# Patient Record
Sex: FEMALE | Race: WHITE | NOT HISPANIC OR LATINO | Employment: OTHER | URBAN - METROPOLITAN AREA
[De-identification: names, ages, dates, MRNs, and addresses within clinical notes are randomized per-mention and may not be internally consistent; named-entity substitution may affect disease eponyms.]

---

## 2017-01-03 RX ORDER — OMEPRAZOLE AND SODIUM BICARBONATE 40; 1100 MG/1; MG/1
1 CAPSULE ORAL EVERY MORNING
Qty: 30 CAPSULE | Refills: 11 | Status: SHIPPED | OUTPATIENT
Start: 2017-01-03 | End: 2017-08-28 | Stop reason: SDUPTHER

## 2017-01-06 ENCOUNTER — TELEPHONE (OUTPATIENT)
Dept: PHARMACY | Facility: CLINIC | Age: 57
End: 2017-01-06

## 2017-01-09 ENCOUNTER — LAB VISIT (OUTPATIENT)
Dept: LAB | Facility: HOSPITAL | Age: 57
End: 2017-01-09
Attending: INTERNAL MEDICINE
Payer: COMMERCIAL

## 2017-01-09 ENCOUNTER — DOCUMENTATION ONLY (OUTPATIENT)
Dept: REHABILITATION | Facility: HOSPITAL | Age: 57
End: 2017-01-09

## 2017-01-09 ENCOUNTER — OFFICE VISIT (OUTPATIENT)
Dept: INTERNAL MEDICINE | Facility: CLINIC | Age: 57
End: 2017-01-09
Payer: COMMERCIAL

## 2017-01-09 VITALS
DIASTOLIC BLOOD PRESSURE: 88 MMHG | WEIGHT: 154.75 LBS | HEART RATE: 94 BPM | BODY MASS INDEX: 29.22 KG/M2 | HEIGHT: 61 IN | SYSTOLIC BLOOD PRESSURE: 138 MMHG | OXYGEN SATURATION: 97 %

## 2017-01-09 DIAGNOSIS — M06.9 RHEUMATOID ARTHRITIS OF HAND, UNSPECIFIED LATERALITY, UNSPECIFIED RHEUMATOID FACTOR PRESENCE: ICD-10-CM

## 2017-01-09 DIAGNOSIS — S51.812A SKIN TEAR OF FOREARM WITHOUT COMPLICATION, LEFT, INITIAL ENCOUNTER: Primary | ICD-10-CM

## 2017-01-09 DIAGNOSIS — Z79.631 METHOTREXATE, LONG TERM, CURRENT USE: ICD-10-CM

## 2017-01-09 LAB
ALBUMIN SERPL BCP-MCNC: 3.5 G/DL
ALP SERPL-CCNC: 53 U/L
ALT SERPL W/O P-5'-P-CCNC: 21 U/L
ANION GAP SERPL CALC-SCNC: 13 MMOL/L
AST SERPL-CCNC: 22 U/L
BASOPHILS # BLD AUTO: 0.03 K/UL
BASOPHILS NFR BLD: 0.4 %
BILIRUB SERPL-MCNC: 0.3 MG/DL
BUN SERPL-MCNC: 13 MG/DL
CALCIUM SERPL-MCNC: 8.7 MG/DL
CHLORIDE SERPL-SCNC: 106 MMOL/L
CO2 SERPL-SCNC: 19 MMOL/L
CREAT SERPL-MCNC: 0.9 MG/DL
CRP SERPL-MCNC: 0.4 MG/L
DIFFERENTIAL METHOD: ABNORMAL
EOSINOPHIL # BLD AUTO: 0 K/UL
EOSINOPHIL NFR BLD: 0.4 %
ERYTHROCYTE [DISTWIDTH] IN BLOOD BY AUTOMATED COUNT: 19.1 %
ERYTHROCYTE [SEDIMENTATION RATE] IN BLOOD BY WESTERGREN METHOD: 5 MM/HR
EST. GFR  (AFRICAN AMERICAN): >60 ML/MIN/1.73 M^2
EST. GFR  (NON AFRICAN AMERICAN): >60 ML/MIN/1.73 M^2
GLUCOSE SERPL-MCNC: 107 MG/DL
HCT VFR BLD AUTO: 32.5 %
HGB BLD-MCNC: 9.7 G/DL
LYMPHOCYTES # BLD AUTO: 1.8 K/UL
LYMPHOCYTES NFR BLD: 21.4 %
MCH RBC QN AUTO: 22.9 PG
MCHC RBC AUTO-ENTMCNC: 29.8 %
MCV RBC AUTO: 77 FL
MONOCYTES # BLD AUTO: 0.9 K/UL
MONOCYTES NFR BLD: 10.8 %
NEUTROPHILS # BLD AUTO: 5.6 K/UL
NEUTROPHILS NFR BLD: 66.6 %
PLATELET # BLD AUTO: 358 K/UL
PMV BLD AUTO: 9.9 FL
POTASSIUM SERPL-SCNC: 3.5 MMOL/L
PROT SERPL-MCNC: 6.3 G/DL
RBC # BLD AUTO: 4.23 M/UL
SODIUM SERPL-SCNC: 138 MMOL/L
WBC # BLD AUTO: 8.41 K/UL

## 2017-01-09 PROCEDURE — 36415 COLL VENOUS BLD VENIPUNCTURE: CPT

## 2017-01-09 PROCEDURE — 85651 RBC SED RATE NONAUTOMATED: CPT

## 2017-01-09 PROCEDURE — 99999 PR PBB SHADOW E&M-EST. PATIENT-LVL III: CPT | Mod: PBBFAC,,, | Performed by: INTERNAL MEDICINE

## 2017-01-09 PROCEDURE — 85025 COMPLETE CBC W/AUTO DIFF WBC: CPT

## 2017-01-09 PROCEDURE — 99212 OFFICE O/P EST SF 10 MIN: CPT | Mod: S$GLB,,, | Performed by: INTERNAL MEDICINE

## 2017-01-09 PROCEDURE — 86140 C-REACTIVE PROTEIN: CPT

## 2017-01-09 PROCEDURE — 80053 COMPREHEN METABOLIC PANEL: CPT

## 2017-01-09 PROCEDURE — 1159F MED LIST DOCD IN RCRD: CPT | Mod: S$GLB,,, | Performed by: INTERNAL MEDICINE

## 2017-01-09 NOTE — PROGRESS NOTES
PHYSICAL THERAPY DISCHARGE SUMMARY     Name: Joyce Peterson  Clinic Number: [id number]    Diagnosis: Right knee pain, difficulty walking  Physician: Isiah Moran MD  Treatment Orders: Discharge Summary  Past Medical History   Diagnosis Date    Acid reflux     Allergy     Anemia     Asthma     Coronary artery disease     Degenerative disc disease     Dry eyes     Dry mouth     Gastroparesis     Hyperlipidemia     Lateral meniscus derangement 4/6/2016    Osteoarthritis     Osteoporosis     Rheumatoid arthritis(714.0)     Umbilical hernia 8/13/2015       Initial visit: 8/10/16  Date of Last visit: 3  Date of Discharge Note:  1/9/17  Total Visits Received: 3  Missed Visits: 3  ASSESSMENT   Status Towards Goals Met:  Patient did not return to physical therapy.  Pt is discharged prior to achieving the goals established in the initial evaluation due to discontinuing physical therapy.     Goals Not achieved and why: see above    Discharge reason : Pt has not re-scheduled further follow-up sessions    PLAN   This patient is discharged from Physical Therapy Services.     Medical necessity is demonstrated by the following IMPAIRMENTS/PROBLEM LIST:  1) Pain limiting function  2) Postural dysfunction  3) Longus colli/suboccipital tightness  4) Upper trapezius/levator scapula tightness  5) Longus colli/scapula stabilizer/core/lumbar/hip weakness   6) Decreased cervical/shoulder/lumbar/hip/knee ROM   7) Decreased cervical/thoracic/lumbar/hip/knee joint mobility  8) Decreased UT/levator scapula/scalenes soft tissue extensibility  9) Decreased piriformis/HS/hip flexor extensibility  10) Lack of HEP  11) right UE paresthesia      GOALS:   Short Term Goals: 4 weeks  1. Pt. to report decreased cervical/lumbar/right knee pain </= 5/10 at worst to increase tolerance for upright unsupported sitting  2. Pt. to demonstrate proper cervical and scapula retraction requiring min. to no verbal cues from PT  3. Increase  mid-cervical joint mobility to 2+/6 to promote greater ease with driving  4. Increase thoracic joint mobility to 2+/6 to promote greater ease with self care skills  5. Increase bilateral cervical rotation AROM to 70 degrees to promote greater ease with driving  6. Pt. to demonstrate increased MMT for cervical paraspinals to 3+/5 to improve tolerance to upright unsupported sitting posture.  7. Pt. to demonstrate increased MMT for B UE elevation to 3+/5 to improve tolerance for ADL and work activities.   8. Pt. to demonstrate increased MMT for mid-trap/lower trap strength to 3/5 to improve tolerance for ADL and work activities.  9. Increased MMT for core/lumbar paraspinals to 3/5 to increase endurance with prolonged sitting/standing.  10. Increased MMT for left gluteus medius to 3+/5 to increase tolerance for ADL and work activities.   11. Pt to tolerate HEP to improve ROM and independence with ADLs  12 Pt. to report a decrease in UE paresthesia by 25% right UE     Long Term Goals: 8 weeks  1. Pt. to report decreased neck/back/hip/knee pain </ = 2/10 at worst to increase tolerance for upright unsupported sitting posture/prolonged standing  2. Pt. to demonstrate proper cervical and scapula retraction requiring no verbal cues from PT  3. Increase mid-cervical joint mobility to 3-/6 to promote greater ease with driving  4. Increase thoracic joint mobility to 3-/6 to promote greater ease with self care skills  5. Increase bilateral cervical rotation AROM to 80 degrees bilaterally to promote greater ease with driving  6. Pt. to demonstrate increased MMT for cervical paraspinals to 4/5 to improve tolerance to upright unsupported sitting posture.  7. Pt. to demonstrate increased MMT for B UE elevation to 4/5 to improve tolerance for ADL and work activities.   8. Pt. to demonstrate increased MMT for mid-trap/lower trap strength to 3+/5 to improve tolerance for ADL and work activities.  9. Increased MMT for core/lumbar  paraspinals to 3+/5 to increase endurance with prolonged sitting/standing.  10. Increased MMT for bilateral gluteus medius to 4/5 to increase tolerance for ADL and work activities.   11. Pt. to present with symmetrical pelvic alignment and report of compliance with activity avoidance/modification to assist with maintenance.  12. Pt to be independent with HEP to improve ROM and independence with ADL's  13. Pt. to report a decrease in UE paresthesia by at least 50% with OH reach/lifting            \

## 2017-01-09 NOTE — PROGRESS NOTES
Joyce Peterson presents today to urgent care for:  skin tear (burning skin tear L arm)      HPI Comments: Patient presents today with a skin tear that happened approximately 5 days ago.  She scraped her arm on the table which took the top layer of skin off.  It's been slightly tender.  No purulent material has been expressed.  No fevers or chills.  No nausea or vomiting.  No warmth or redness noted.  She is up-to-date on her tetanus vaccine-see health maintenance module.      Past medical, social, family and surgical history was reviewed and updated today as needed. See encounter for details.     Review of Systems   Constitutional: Negative for chills and fever.   Gastrointestinal: Negative for nausea and vomiting.       Vitals:    01/09/17 1035   BP: 138/88   Pulse: 94   Body mass index is 29.24 kg/(m^2).   Physical Exam   Musculoskeletal:        Left forearm: She exhibits laceration.        Arms:  No warmth or tenderness to touch.        Assessment/plan:   1. Skin tear of forearm without complication, left, initial encounter  Reassurance that the patient does not have any infection.  She's currently up-to-date on all of her vaccines including tetanus.  Continue with conservative management with antibiotic ointment and covering the wound until healed.  Warning signs for infection discussed in detail and patient voiced understanding.    No Follow-up on file. follow-up as needed.  Patient educational information provided through [facility].

## 2017-01-09 NOTE — PATIENT INSTRUCTIONS
Abrasions  Abrasions are skin scrapes. Their treatment depends on how large and deep the abrasion is.  Home care  You may be prescribed an antibiotic cream or ointment to apply to the wound. This helps prevent infection. Follow instructions when using this medication.  General care  · To care for the abrasion, do the following each day for as long as directed by your health care provider.  ¨ If you were given a bandage, change it once a day. If your bandage sticks to the wound, soak it in warm water until it loosens.  ¨ Wash the area with soap and warm water. You may do this in a sink or under a tub faucet or shower. Rinse off the soap. Then pat the area dry with a clean towel.  ¨ If antibiotic ointment or cream was prescribed, reapply it to the wound as directed. Cover the wound with a fresh non-stick bandage. If the bandage becomes wet or dirty, change it as soon as possible.  · You may use acetaminophen or ibuprofen to control pain unless another pain medication was prescribed. Note: If you have chronic liver or kidney disease or ever had a stomach ulcer or GI bleeding, talk with your health care provider before using these medications. Do not use ibuprofen in children under six months of age.  · Most skin wounds heal within ten days. But an infection may occur despite treatment. Therefore, monitor the wound for signs of infection as listed below.  Follow-up care  Follow up with your health care provider, or as advised.  When to seek medical advice  Call your health care provider right away if any of these occur:  · Fever of 101ºF (38.3ºC) or higher, or as directed by your health care provider  · Increasing pain, redness, swelling, or drainage from the wound  · Bleeding from the wound that does not stop after a few minutes of steady, firm pressure  · Decreased ability to move any body part near wound  © 4332-8772 [employer]. [address] [place] [address], [place] [address]. All rights reserved. This  information is not intended as a substitute for professional medical care. Always follow your healthcare professional's instructions.

## 2017-01-10 ENCOUNTER — TELEPHONE (OUTPATIENT)
Dept: PHARMACY | Facility: CLINIC | Age: 57
End: 2017-01-10

## 2017-01-11 DIAGNOSIS — K31.84 GASTROPARESIS: ICD-10-CM

## 2017-01-23 ENCOUNTER — TELEPHONE (OUTPATIENT)
Dept: PHARMACY | Facility: CLINIC | Age: 57
End: 2017-01-23

## 2017-01-24 RX ORDER — NAPROXEN 500 MG/1
TABLET ORAL
Qty: 60 TABLET | Refills: 1 | Status: SHIPPED | OUTPATIENT
Start: 2017-01-24 | End: 2017-03-27 | Stop reason: SDUPTHER

## 2017-02-01 ENCOUNTER — LAB VISIT (OUTPATIENT)
Dept: LAB | Facility: HOSPITAL | Age: 57
End: 2017-02-01
Attending: INTERNAL MEDICINE
Payer: COMMERCIAL

## 2017-02-01 DIAGNOSIS — Z79.631 METHOTREXATE, LONG TERM, CURRENT USE: ICD-10-CM

## 2017-02-01 DIAGNOSIS — M06.9 RHEUMATOID ARTHRITIS OF HAND, UNSPECIFIED LATERALITY, UNSPECIFIED RHEUMATOID FACTOR PRESENCE: ICD-10-CM

## 2017-02-01 LAB
ALBUMIN SERPL BCP-MCNC: 3.5 G/DL
ALP SERPL-CCNC: 59 U/L
ALT SERPL W/O P-5'-P-CCNC: 15 U/L
ANION GAP SERPL CALC-SCNC: 6 MMOL/L
AST SERPL-CCNC: 22 U/L
BASOPHILS # BLD AUTO: 0.03 K/UL
BASOPHILS NFR BLD: 0.5 %
BILIRUB SERPL-MCNC: 0.2 MG/DL
BUN SERPL-MCNC: 13 MG/DL
CALCIUM SERPL-MCNC: 8.5 MG/DL
CHLORIDE SERPL-SCNC: 105 MMOL/L
CO2 SERPL-SCNC: 24 MMOL/L
CREAT SERPL-MCNC: 1 MG/DL
CRP SERPL-MCNC: 0.5 MG/L
DIFFERENTIAL METHOD: ABNORMAL
EOSINOPHIL # BLD AUTO: 0 K/UL
EOSINOPHIL NFR BLD: 0.2 %
ERYTHROCYTE [DISTWIDTH] IN BLOOD BY AUTOMATED COUNT: 19.6 %
ERYTHROCYTE [SEDIMENTATION RATE] IN BLOOD BY WESTERGREN METHOD: 7 MM/HR
EST. GFR  (AFRICAN AMERICAN): >60 ML/MIN/1.73 M^2
EST. GFR  (NON AFRICAN AMERICAN): >60 ML/MIN/1.73 M^2
GLUCOSE SERPL-MCNC: 190 MG/DL
HCT VFR BLD AUTO: 32 %
HGB BLD-MCNC: 9.7 G/DL
LYMPHOCYTES # BLD AUTO: 1.3 K/UL
LYMPHOCYTES NFR BLD: 19.2 %
MCH RBC QN AUTO: 23 PG
MCHC RBC AUTO-ENTMCNC: 30.3 %
MCV RBC AUTO: 76 FL
MONOCYTES # BLD AUTO: 0.4 K/UL
MONOCYTES NFR BLD: 6.4 %
NEUTROPHILS # BLD AUTO: 4.8 K/UL
NEUTROPHILS NFR BLD: 73.5 %
PLATELET # BLD AUTO: 355 K/UL
PMV BLD AUTO: 10.1 FL
POTASSIUM SERPL-SCNC: 4.3 MMOL/L
PROT SERPL-MCNC: 6.6 G/DL
RBC # BLD AUTO: 4.21 M/UL
SODIUM SERPL-SCNC: 135 MMOL/L
WBC # BLD AUTO: 6.55 K/UL

## 2017-02-01 PROCEDURE — 36415 COLL VENOUS BLD VENIPUNCTURE: CPT | Mod: PO

## 2017-02-01 PROCEDURE — 85025 COMPLETE CBC W/AUTO DIFF WBC: CPT

## 2017-02-01 PROCEDURE — 80053 COMPREHEN METABOLIC PANEL: CPT

## 2017-02-01 PROCEDURE — 85651 RBC SED RATE NONAUTOMATED: CPT

## 2017-02-01 PROCEDURE — 86140 C-REACTIVE PROTEIN: CPT

## 2017-02-02 ENCOUNTER — TELEPHONE (OUTPATIENT)
Dept: RHEUMATOLOGY | Facility: CLINIC | Age: 57
End: 2017-02-02

## 2017-02-02 DIAGNOSIS — R73.9 HYPERGLYCEMIA: Primary | ICD-10-CM

## 2017-02-03 ENCOUNTER — PATIENT MESSAGE (OUTPATIENT)
Dept: RHEUMATOLOGY | Facility: CLINIC | Age: 57
End: 2017-02-03

## 2017-02-07 ENCOUNTER — OFFICE VISIT (OUTPATIENT)
Dept: RHEUMATOLOGY | Facility: CLINIC | Age: 57
End: 2017-02-07
Payer: COMMERCIAL

## 2017-02-07 ENCOUNTER — HOSPITAL ENCOUNTER (OUTPATIENT)
Dept: RADIOLOGY | Facility: HOSPITAL | Age: 57
Discharge: HOME | End: 2017-02-07
Attending: INTERNAL MEDICINE
Payer: COMMERCIAL

## 2017-02-07 ENCOUNTER — PATIENT MESSAGE (OUTPATIENT)
Dept: RHEUMATOLOGY | Facility: CLINIC | Age: 57
End: 2017-02-07

## 2017-02-07 VITALS
DIASTOLIC BLOOD PRESSURE: 74 MMHG | HEART RATE: 91 BPM | SYSTOLIC BLOOD PRESSURE: 116 MMHG | HEIGHT: 64 IN | WEIGHT: 154 LBS | BODY MASS INDEX: 26.29 KG/M2

## 2017-02-07 DIAGNOSIS — K21.9 GASTROESOPHAGEAL REFLUX DISEASE, ESOPHAGITIS PRESENCE NOT SPECIFIED: ICD-10-CM

## 2017-02-07 DIAGNOSIS — M47.12 OSTEOARTHRITIS OF CERVICAL SPINE WITH MYELOPATHY: ICD-10-CM

## 2017-02-07 DIAGNOSIS — M85.80 OSTEOPENIA: ICD-10-CM

## 2017-02-07 DIAGNOSIS — M53.2X6 LUMBAR SPINE INSTABILITY: ICD-10-CM

## 2017-02-07 DIAGNOSIS — M06.9 RHEUMATOID ARTHRITIS INVOLVING MULTIPLE SITES, UNSPECIFIED RHEUMATOID FACTOR PRESENCE: ICD-10-CM

## 2017-02-07 DIAGNOSIS — M54.50 ACUTE BILATERAL LOW BACK PAIN WITHOUT SCIATICA: ICD-10-CM

## 2017-02-07 DIAGNOSIS — Z78.0 MENOPAUSE: Primary | ICD-10-CM

## 2017-02-07 DIAGNOSIS — M54.6 THORACIC BACK PAIN, UNSPECIFIED BACK PAIN LATERALITY, UNSPECIFIED CHRONICITY: ICD-10-CM

## 2017-02-07 DIAGNOSIS — M06.9 RHEUMATOID ARTHRITIS, INVOLVING UNSPECIFIED SITE, UNSPECIFIED RHEUMATOID FACTOR PRESENCE: ICD-10-CM

## 2017-02-07 DIAGNOSIS — D84.9 IMMUNOSUPPRESSED STATUS: ICD-10-CM

## 2017-02-07 DIAGNOSIS — K31.84 GASTROPARESIS: ICD-10-CM

## 2017-02-07 DIAGNOSIS — M47.816 SPONDYLOSIS OF LUMBAR REGION WITHOUT MYELOPATHY OR RADICULOPATHY: ICD-10-CM

## 2017-02-07 PROCEDURE — 99214 OFFICE O/P EST MOD 30 MIN: CPT | Mod: S$GLB,,, | Performed by: INTERNAL MEDICINE

## 2017-02-07 PROCEDURE — 77077 JOINT SURVEY SINGLE VIEW: CPT | Mod: TC

## 2017-02-07 PROCEDURE — 77077 JOINT SURVEY SINGLE VIEW: CPT | Mod: 26,,, | Performed by: RADIOLOGY

## 2017-02-07 PROCEDURE — 99999 PR PBB SHADOW E&M-EST. PATIENT-LVL V: CPT | Mod: PBBFAC,,, | Performed by: INTERNAL MEDICINE

## 2017-02-07 RX ORDER — PREDNISONE 1 MG/1
2 TABLET ORAL DAILY
Qty: 180 TABLET | Refills: 1 | Status: SHIPPED | OUTPATIENT
Start: 2017-02-07 | End: 2017-08-30 | Stop reason: SDUPTHER

## 2017-02-07 RX ORDER — LEFLUNOMIDE 20 MG/1
20 TABLET ORAL DAILY
Qty: 30 TABLET | Refills: 2 | Status: SHIPPED | OUTPATIENT
Start: 2017-02-07 | End: 2017-05-10 | Stop reason: SDUPTHER

## 2017-02-07 RX ORDER — PREDNISONE 5 MG/1
5 TABLET ORAL DAILY
Qty: 90 TABLET | Refills: 1 | Status: SHIPPED | OUTPATIENT
Start: 2017-02-07 | End: 2017-03-06 | Stop reason: DRUGHIGH

## 2017-02-07 ASSESSMENT — ROUTINE ASSESSMENT OF PATIENT INDEX DATA (RAPID3)
MDHAQ FUNCTION SCORE: 1.7
PATIENT GLOBAL ASSESSMENT SCORE: 8
AM STIFFNESS SCORE: 1, YES
FATIGUE SCORE: 5
PSYCHOLOGICAL DISTRESS SCORE: 2.2
TOTAL RAPID3 SCORE: 7.72
WHEN YOU AWAKENED IN THE MORNING OVER THE LAST WEEK, PLEASE INDICATE THE AMOUNT OF TIME IT TAKES UNTIL YOU ARE AS LIMBER AS YOU WILL BE FOR THE DAY: 3 HRS
PAIN SCORE: 9.5

## 2017-02-07 ASSESSMENT — DISEASE ACTIVITY SCORE (DAS28)
CRP_MG_PER_LITER: .5
SWOLLEN_JOINTS_COUNT: 0
GLOBAL_HEALTH_SCORE: 80
TOTAL_SCORE_ESR: 3.04
TENDER_JOINTS_COUNT: 1
ESR_MM_PER_HR: 7
TOTAL_SCORE_CRP: 2.79

## 2017-02-07 NOTE — PROGRESS NOTES
Subjective:       Patient ID: Joyce Peterson is a 56 y.o. female.    Chief Complaint: RA, secondary Sjogren's,    HPI    Severe pain mid back daily over past 7-10 days, continuous but with sharp acute exacerbations.  Not positional, lying or standing. Better standing up. Back of chair sensitive to skin mid back. Went to ED Sat( 4 days ago), chest x-ray normal. She increased prednisone from 5mg to 10mg x 1 day, which helped significantly, now back to 5mg daily. Some arthralgia shoulders, pain elbows. Some ankle pain with slight swelling. Overall fewer GI symptoms off methotrexate. Tolerating leflunomide well    Still recurrent pleurisy, but milder, worse with inspiration and with local thoracic pressure.Saw Dr. Laguerre in Pulmonary  12/8/16:    Uncontrolled asthma with exacerbation four times a year triggered by chronic sinusitis (not bronchitis).  Additional trigger is reflux  Needs to adhere to controller regimen.  Symbicort 2 puffs twice daily keep next to tooth brush and brush teeth after. This is your asthma controller and should be used indefinitely.  Albuterol for rescue (when short of breath, coughing, wheezing) and prevention (prior to activities that make you have symptoms)  Flonase two sprays per nostril daily  Saline nasal irrigation daily in the shower.  Claritin as needed  Pleurisy likely related to autoimmune disease  RTC in three months     Review of Systems   Constitutional: Negative for appetite change, fatigue, fever and unexpected weight change.   HENT: Negative for mouth sores.         Dry mouth   Eyes: Negative for visual disturbance.        Dry   Respiratory: Negative for cough, shortness of breath and wheezing.    Cardiovascular: Negative for chest pain and palpitations.   Gastrointestinal: Negative for abdominal pain, anal bleeding, blood in stool, constipation, diarrhea, nausea and vomiting.   Genitourinary: Negative for dysuria, frequency and urgency.   Musculoskeletal: Positive for back  "pain. Negative for arthralgias, gait problem, joint swelling, myalgias, neck pain and neck stiffness.   Skin: Negative for rash.   Neurological: Positive for numbness. Negative for weakness and headaches.   Hematological: Negative for adenopathy. Does not bruise/bleed easily.   Psychiatric/Behavioral: Negative for sleep disturbance. The patient is not nervous/anxious.          Objective:     Visit Vitals    /74    Pulse 91    Ht 5' 3.6" (1.615 m)    Wt 69.9 kg (154 lb)    BMI 26.77 kg/m2        Physical Exam   Constitutional: She is oriented to person, place, and time and well-developed, well-nourished, and in no distress.   HENT:   Head: Normocephalic and atraumatic.   Mouth/Throat: Oropharynx is clear and moist.   Eyes: Conjunctivae and EOM are normal.   Neck: Normal range of motion. Neck supple. No thyromegaly and no head tilt present.   Cardiovascular: Normal rate, regular rhythm, normal heart sounds and intact distal pulses.  Exam reveals no gallop and no friction rub.    No murmur heard.  Pulmonary/Chest: Breath sounds normal. No respiratory distress. She has no wheezes. She has no rales. She exhibits tenderness.   No pleural rub   Abdominal: Soft. She exhibits no distension and no mass. There is no tenderness.       Right Side Rheumatological Exam     Examination finds the shoulder, elbow, wrist, knee, 1st PIP, 1st MCP, 2nd PIP, 2nd MCP, 3rd PIP, 3rd MCP, 4th PIP, 4th MCP, 5th PIP and 5th MCP normal.    Left Side Rheumatological Exam     Examination finds the shoulder, elbow, wrist, knee, 1st PIP, 1st MCP, 2nd PIP, 2nd MCP, 3rd PIP, 3rd MCP, 4th PIP, 4th MCP, 5th PIP and 5th MCP normal.      Back/Neck Exam   General Inspection   Gait: normal     Spinal Kyphosis: absent  Spinal Lordosis:  absent  Head Tilt:  negative    Neck Range of Motion   Flexion: Limited and mild  Extension: Limited and mild  Right Lateral Bend: abnormal  Left Lateral Bend: abnormal  Right Rotation: abnormal  Left Rotation: " abnormal    Comments:  No percussion tenderness thoracic or lumbar spine  No rash    Lymphadenopathy:     She has no cervical adenopathy.   Neurological: She is alert and oriented to person, place, and time. She displays normal reflexes. Gait normal.   Nl motor strength UE and LE bilateral   Musculoskeletal: She exhibits no edema.       Results for TONNY GOMEZ (MRN [id number]) as of 2/7/2017 14:51   Ref. Range 2/1/2017 13:32 2/6/2017 10:05 2/6/2017 12:05   WBC Latest Ref Range: 3.90 - 12.70 K/uL 6.55     RBC Latest Ref Range: 4.00 - 5.40 M/uL 4.21     Hemoglobin Latest Ref Range: 12.0 - 16.0 g/dL 9.7 (L)     Hematocrit Latest Ref Range: 37.0 - 48.5 % 32.0 (L)     MCV Latest Ref Range: 82 - 98 fL 76 (L)     MCH Latest Ref Range: 27.0 - 31.0 pg 23.0 (L)     MCHC Latest Ref Range: 32.0 - 36.0 % 30.3 (L)     RDW Latest Ref Range: 11.5 - 14.5 % 19.6 (H)     Platelets Latest Ref Range: 150 - 350 K/uL 355 (H)     MPV Latest Ref Range: 9.2 - 12.9 fL 10.1     Gran% Latest Ref Range: 38.0 - 73.0 % 73.5 (H)     Gran # Latest Ref Range: 1.8 - 7.7 K/uL 4.8     Lymph% Latest Ref Range: 18.0 - 48.0 % 19.2     Lymph # Latest Ref Range: 1.0 - 4.8 K/uL 1.3     Mono% Latest Ref Range: 4.0 - 15.0 % 6.4     Mono # Latest Ref Range: 0.3 - 1.0 K/uL 0.4     Eosinophil% Latest Ref Range: 0.0 - 8.0 % 0.2     Eos # Latest Ref Range: 0.0 - 0.5 K/uL 0.0     Basophil% Latest Ref Range: 0.0 - 1.9 % 0.5     Baso # Latest Ref Range: 0.00 - 0.20 K/uL 0.03     Sed Rate Latest Ref Range: 0 - 20 mm/Hr 7     Sodium Latest Ref Range: 136 - 145 mmol/L 135 (L)     Potassium Latest Ref Range: 3.5 - 5.1 mmol/L 4.3     Chloride Latest Ref Range: 95 - 110 mmol/L 105     CO2 Latest Ref Range: 23 - 29 mmol/L 24     Anion Gap Latest Ref Range: 8 - 16 mmol/L 6 (L)     BUN, Bld Latest Ref Range: 6 - 20 mg/dL 13     Creatinine Latest Ref Range: 0.5 - 1.4 mg/dL 1.0     eGFR if non African American Latest Ref Range: >60 mL/min/1.73 m^2 >60.0     eGFR if African  "American Latest Ref Range: >60 mL/min/1.73 m^2 >60.0     Glucose Latest Ref Range: 70 - 110 mg/dL 190 (H)     Calcium Latest Ref Range: 8.7 - 10.5 mg/dL 8.5 (L)     Alkaline Phosphatase Latest Ref Range: 55 - 135 U/L 59     Total Protein Latest Ref Range: 6.0 - 8.4 g/dL 6.6     Albumin Latest Ref Range: 3.5 - 5.2 g/dL 3.5     Total Bilirubin Latest Ref Range: 0.1 - 1.0 mg/dL 0.2     AST Latest Ref Range: 10 - 40 U/L 22     ALT Latest Ref Range: 10 - 44 U/L 15     CRP Latest Ref Range: 0.0 - 8.2 mg/L 0.5     Glucose, Fasting Latest Ref Range: 70 - 110 mg/dL   78   Hemoglobin A1C Latest Ref Range: 4.5 - 6.2 %  6.0    Estimated Avg Glucose Latest Ref Range: 68 - 131 mg/dL  126    Differential Method Unknown Automated     Results for JASON TONNY B (MRN [id number]) as of 2/7/2017 15:03   Ref. Range 11/19/2016 11:43   Cholesterol Latest Ref Range: 120 - 199 mg/dL 203 (H)   HDL Latest Ref Range: 40 - 75 mg/dL 98 (H)   LDL Cholesterol Latest Ref Range: 63.0 - 159.0 mg/dL 76.6   Total Cholesterol/HDL Ratio Latest Ref Range: 2.0 - 5.0  2.1   Triglycerides Latest Ref Range: 30 - 150 mg/dL 142        Time of Procedure: 11/14/16 14:10:00  Accession # [id number]  Comparison: Chest radiograph 10/11/16.    Technique:   The chest was surveyed from the apices through the costophrenic angles.  Data was reconstructed at 5-mm increments for contiguous 5-mm images in the axial, sagittal and coronal planes and post processed for extraction of 1.25-mm "high resolution" images at 10-mm increments and for 8 mm maximal-intensity (MIP) images at 2 mm increments confined to the axial plane.  No additional radiation was employed.      Findings:  We detect no convincing evidence of abnormality at the base of the neck.      There is a left-sided arch with 3 branch vessels.  Aorta maintains normal caliber, contour, and course. There is moderate coronary atherosclerosis, greater than expected for the patient's age, for which consultation with " cardiology is recommended.    Pulmonary arteries have normal caliber, contour and distribution.  There are four pulmonary veins.  Systemic and pulmonary veno atrial connections are concordant.    No significant pleural or pericardial fluid is identified.  There is no axillary or mediastinal lymph node enlargement.  The hilar contours are unremarkable on this noncontrasted examination.    The esophagus is mildly dilated with air and small volume of dependent fluid, less conspicuous than on prior chest radiograph performed 10/11/16.  Given the patient's history of rheumatoid arthritis, esophageal dysmotility, including but not limited to achalasia, should be considered.   The gallbladder surgically absent, and there is a small volume of pneumobilia.  No additional additional significant abnormality is identified within the upper abdomen.    The superficial soft tissues demonstrate no significant abnormalities. The osseous structures demonstrate mild degenerative changes of the thoracic spine without evidence of fracture or aggressive lesion.    The tracheobronchial tree demonstrates no significant abnormality.    The lungs are symmetrically expanded.  No significant pulmonary disease is identified.   Impression     1. Mild dilatation of the esophagus with air and fluid, less conspicuous than on chest radiograph performed 10/11/16.  Given the patient's history of rheumatoid arthritis, esophageal dysmotility, including but not limited to achalasia, should be considered.    2.  Moderate coronary artery atherosclerosis, greater than expected for the patient's age, for which consultation with cardiology is recommended.    3.  No significant pulmonary disease identified.      This report has been flagged in the Epic medical record system.  ______________________________________     Electronically signed by resident: MARITZA HERNÁNDEZ MD  Date: 11/14/16  Time: 15:44            As the supervising and teaching physician, I  personally reviewed the images and resident's interpretation and I agree with the findings.          Electronically signed by: Antionette Sifuentes MD  Date: 11/14/16  Time: 15:50      The bone density shows osteopenia, but not low enough to warrant drug therapy, only calcium 1200mg daily and vitamin D 800-1000 units daily OTC. RJQ   External Result Report   External Result Report   Narrative   Clinical History: Postmenopausal    A quantitative bone mineral density was obtained using the DEXA technique.      The bone mineral density measured from L1 through L4 is 1.010g/cm2.  This corresponds to a T score of -1.5 and a Z score of -0.7.    The bone mineral density within the left femoral neck measures 0.899 g/cm2.  This corresponds to a T score of -1.0 and a Z score of 0.0.    The bone mineral density within the right femoral neck measures 0.855 g/cm2.  This corresponds to a T score of -1.3 and a Z score of -0.3.    FRAX RESULTS:  10-year Probability of Fracture:  Major Osteoporotic Fracture 13.4%.  Hip Fracture 1.1%.   Impression     Osteopenia of the lumbar spine and right femoral neck.  Normal bone mineral density of the left femoral neck.    World Health Organization criteria:  Normal -- T score greater than or equal to -1.0.  Osteopenia -- T score between -1.0 and -2.5.  Osteoporosis -- T score less than or equal to -2.5.        Electronically signed by: JOSEPH WEISS MD  Date: 08/22/14  Time: 13:05     Encounter   View Encounter      Reviewed By   Isiah Moran MD on 8/25/2014  5:44 PM     Median latencies are prolonged, MNCV is mildly slow.   Other nerves are normal.   No abnormalities noted on needle exam.   CONCLUSION: COMPARISON TO STUDY PERFORMED ON 7/29/2013.   MODERATE CARPAL TUNNEL SYNDROME.   THERE HAS BEEN MILD PROGRESSION FROM THE PREVIOUS EXAM.   _______________________________________   Wong Leal III, MD, FAANEM   Assessment:     erosive RA discontinued long term methotrexate b/o nausea with po  and sc and reduced dose, tolerating leflunomide. DAS28: 3.04 XRP41-EMV 2.79(both LDA) but on prednisone 5mg daily    Secondary Sjogren's   coronary ASCVD   dilated esophagus, dysmotility to have EGD and colonoscopy with Dr. Moore   osteopenia < NOF FRAX therapy threshold but overdue for DXA and is on Premarin   right CTS using thumb brace rather than wrist brace, .   Anemia iron deficiency  Plan:     MRI w/wo contrast thoracic and lumbar spine  DXA   arthritis survey today   ref to Dr. Yarbrough for right CTS worsening    Increase prednisone to 7mg daily   Cont tofacitinib-XR 11mg daily  Cont leflunomide 20mg daily  F/u Dr. Moore for EGD/colonoscopy  Sleep in wrist brace   RTC 3 months with standing labs

## 2017-02-07 NOTE — MR AVS SNAPSHOT
[facility]  [address]  [facility] [address]  Phone: [phone]  Fax: [phone]                  Joyce Peterson   2017 2:30 PM   Office Visit    Description:  Female : 1960   Provider:  Isiah Moran MD   Department:  [facility]           Diagnoses this Visit        Comments    Menopause    -  Primary     Rheumatoid arthritis involving multiple sites, unspecified rheumatoid factor presence         Rheumatoid arthritis, involving unspecified site, unspecified rheumatoid factor presence         Thoracic back pain, unspecified back pain laterality, unspecified chronicity         Lumbar spine instability         Immunosuppressed status         Osteoarthritis of cervical spine with myelopathy         Spondylosis of lumbar region without myelopathy or radiculopathy         Osteopenia         Gastroparesis         Gastroesophageal reflux disease, esophagitis presence not specified         Acute bilateral low back pain without sciatica                To Do List           Future Appointments        Provider Department Dept Phone    3/6/2017 11:30 AM Lani Laguerre MD [facility] Pulmonary Services [phone]      Goals (5 Years of Data)     None       These Medications        Disp Refills Start End    predniSONE (DELTASONE) 5 MG tablet 90 tablet 1 2017     Take 1 tablet (5 mg total) by mouth once daily. - Oral    Pharmacy: [facility]/pharmacy #[id number] - [place], [place] - [address] [address] Ph #: [phone]       predniSONE (DELTASONE) 1 MG tablet 180 tablet 1 2017    Take 2 tablets (2 mg total) by mouth once daily. - Oral    Pharmacy: [facility]/pharmacy #[id number] - [place], [place] - [address] [address] Ph #: [phone]       leflunomide (ARAVA) 20 MG Tab 30 tablet 2 2017     Take 1 tablet (20 mg total) by mouth once daily. - Oral    Pharmacy: [facility] [place]  [address] Ph #: [phone]       tofacitinib  (XELJANZ XR) 11 mg Tb24 30 tablet 1 2/7/2017     Take 1 tablet by mouth once daily. - Oral    Pharmacy: [facility] - [address] [place] [address] [place] Ph #: [phone]         [facility]     [facility] - 24/7 Assistance  Registered nurses in the [facility] provide clinical advisement, health education, appointment booking, and other advisory services.  Call for this free service at [phone].             Medications           Message regarding Medications     Verify the changes and/or additions to your medication regime listed below are the same as discussed with your clinician today.  If any of these changes or additions are incorrect, please notify your healthcare provider.        START taking these NEW medications        Refills    predniSONE (DELTASONE) 1 MG tablet 1    Sig: Take 2 tablets (2 mg total) by mouth once daily.    Class: Normal    Route: Oral      CHANGE how you are taking these medications     Start Taking Instead of    predniSONE (DELTASONE) 5 MG tablet predniSONE (DELTASONE) 5 MG tablet    Dosage:  Take 1 tablet (5 mg total) by mouth once daily. Dosage:  Take 1.5 tablets (7.5 mg total) by mouth once daily.    Reason for Change:  Reorder     tofacitinib (XELJANZ XR) 11 mg Tb24 XELJANZ XR 11 mg Tb24    Dosage:  Take 1 tablet by mouth once daily. Dosage:  TAKE ONE TABLET BY MOUTH EVERY DAY    Reason for Change:  Reorder       STOP taking these medications     folic acid (FOLVITE) 1 MG tablet TAKE 1 TABLET BY MOUTH EVERY DAY           Verify that the below list of medications is an accurate representation of the medications you are currently taking.  If none reported, the list may be blank. If incorrect, please contact your healthcare provider. Carry this list with you in case of emergency.           Current Medications     albuterol (PROVENTIL) 2.5 mg /3 mL (0.083 %) nebulizer solution Take 3 mLs (2.5 mg total) by nebulization every  6 (six) hours as needed for Wheezing.    albuterol 90 mcg/actuation inhaler Inhale 2 puffs into the lungs every 6 (six) hours as needed for Wheezing.    aspirin 81 mg Tab Take 81 mg by mouth every morning.     budesonide-formoterol 160-4.5 mcg (SYMBICORT) 160-4.5 mcg/actuation HFAA Inhale 2 puffs into the lungs every 4 to 6 hours as needed.    calcium citrate-vitamin D (CITRACAL + D) 315-200 mg-unit per tablet Take 1 tablet by mouth 2 (two) times daily.     CRESTOR 20 mg tablet Take 1 tablet (20 mg total) by mouth once daily.    CYCLOSPORINE (RESTASIS OPHT) Inject 0.05 % into the eye. 1 Dropperette Both eyes Twice a day .  dispense one month supply/sixty-four vials/ two trays    estrogens, conjugated, (PREMARIN) 0.625 MG tablet Take 0.625 mg by mouth every evening. 1 Tablet Oral Every day, at bedtime    fish oil-omega-3 fatty acids 300-1,000 mg capsule Take 1,400 g by mouth once daily.    flaxseed oil 1,000 mg Cap Take by mouth once daily.    GUAIFENESIN (MUCINEX ORAL) Take 400 mg by mouth every 4 (four) hours as needed.     INV PROPULSID 10 MG Take 20 mg by mouth 4 (four) times daily as directed by physician.  For investigational use only    leflunomide (ARAVA) 20 MG Tab Take 1 tablet (20 mg total) by mouth once daily.    montelukast (SINGULAIR) 10 mg tablet TAKE 1 TABLET AT BEDTIME    naproxen (NAPROSYN) 500 MG tablet TAKE 1 TABLET TWICE A DAY    omeprazole (PRILOSEC) 40 MG capsule TAKE ONE CAPSULE BY MOUTH EVERY MORNING    omeprazole-sodium bicarbonate (ZEGERID) 40-1.1 mg-gram per capsule Take 1 capsule by mouth every morning.    POTASSIUM ORAL Take by mouth once daily.    predniSONE (DELTASONE) 5 MG tablet Take 1 tablet (5 mg total) by mouth once daily.    progesterone (PROMETRIUM) 100 MG capsule Take 100 mg by mouth every morning. 1 Capsule Oral Every day, morning    promethazine (PHENERGAN) 12.5 MG Tab Take 1 tablet (12.5 mg total) by mouth 4 (four) times daily.    RESTASIS 0.05 % ophthalmic emulsion      "rizatriptan (MAXALT) 10 MG tablet Take 10 mg by mouth as needed for Migraine.     sucralfate (CARAFATE) 1 gram tablet Take 1 tablet (1 g total) by mouth 4 (four) times daily.    tofacitinib (XELJANZ XR) 11 mg Tb24 Take 1 tablet by mouth once daily.    VESICARE 10 mg tablet Take 1 tablet by mouth every morning.     vitamin E 1000 UNIT capsule Take 1,000 Units by mouth once daily.    azelastine (ASTELIN) 137 mcg nasal spray 1 spray (137 mcg total) by Nasal route 2 (two) times daily.    predniSONE (DELTASONE) 1 MG tablet Take 2 tablets (2 mg total) by mouth once daily.           Clinical Reference Information           Your Vitals Were     BP Pulse Height Weight BMI    116/74 91 5' 3.6" (1.615 m) 69.9 kg (154 lb) 26.77 kg/m2      Blood Pressure          Most Recent Value    BP  116/74      Allergies as of 2/7/2017     Actemra [Tocilizumab]    Codeine    Gold Au 198    Hydroxychloroquine    Iodinated Contrast Media - Iv Dye    Iodine    Sulfa (Sulfonamide Antibiotics)    Zofran [Ondansetron Hcl (Pf)]    Methotrexate Analogues    Pneumovax 23 [Pneumococcal 23-argenis Ps Vaccine]      Immunizations Administered on Date of Encounter - 2/7/2017     None      Orders Placed During Today's Visit     Future Labs/Procedures Expected by Expires    DXA Bone Density Spine And Hip_Axial Skeleton  2/7/2017 2/7/2018    MRI Lumbar Spine W WO Contrast  2/7/2017 2/7/2018    MRI Thoracic Spine W WO Cont  2/7/2017 2/7/2018    XR Arthritis Survey  2/7/2017 2/7/2018      Language Assistance Services     ATTENTION: Language assistance services are available, free of charge. Please call [phone].      ATENCIÓN: Si habla español, tiene a hernández disposición servicios gratuitos de asistencia lingüística. Llame al [phone].     ELSA Ý: N?u b?n nói Ti?ng Vi?t, có các d?ch v? h? tr? ngôn ng? mi?n phí dành cho b?n. G?i s? [phone].         [facility] complies with applicable Federal civil rights laws and does not discriminate on " the basis of race, color, national origin, age, disability, or sex.

## 2017-02-08 ENCOUNTER — TELEPHONE (OUTPATIENT)
Dept: RHEUMATOLOGY | Facility: CLINIC | Age: 57
End: 2017-02-08

## 2017-02-14 ENCOUNTER — PATIENT MESSAGE (OUTPATIENT)
Dept: RHEUMATOLOGY | Facility: CLINIC | Age: 57
End: 2017-02-14

## 2017-02-16 ENCOUNTER — TELEPHONE (OUTPATIENT)
Dept: RHEUMATOLOGY | Facility: CLINIC | Age: 57
End: 2017-02-16

## 2017-02-16 ENCOUNTER — HOSPITAL ENCOUNTER (OUTPATIENT)
Dept: RADIOLOGY | Facility: CLINIC | Age: 57
Discharge: HOME | End: 2017-02-16
Attending: INTERNAL MEDICINE
Payer: COMMERCIAL

## 2017-02-16 ENCOUNTER — TELEPHONE (OUTPATIENT)
Dept: PHARMACY | Facility: CLINIC | Age: 57
End: 2017-02-16

## 2017-02-16 DIAGNOSIS — Z78.0 MENOPAUSE: ICD-10-CM

## 2017-02-16 PROCEDURE — 77080 DXA BONE DENSITY AXIAL: CPT | Mod: TC

## 2017-02-16 PROCEDURE — 77080 DXA BONE DENSITY AXIAL: CPT | Mod: 26,,, | Performed by: INTERNAL MEDICINE

## 2017-02-16 NOTE — TELEPHONE ENCOUNTER
Peer to Peer is needed for t spine & l spine with [facility].  Please call [phone] option 3 with case #'s [id number] & [id number].  Thanks for your help.  [place] [id number]    Peer to peer done by me:  Approval #[id number] (Tspine)  And [id number] (Lspine)   17

## 2017-02-20 ENCOUNTER — HOSPITAL ENCOUNTER (OUTPATIENT)
Dept: RADIOLOGY | Facility: HOSPITAL | Age: 57
Discharge: HOME | End: 2017-02-20
Attending: INTERNAL MEDICINE
Payer: COMMERCIAL

## 2017-02-20 ENCOUNTER — PATIENT MESSAGE (OUTPATIENT)
Dept: GASTROENTEROLOGY | Facility: CLINIC | Age: 57
End: 2017-02-20

## 2017-02-20 DIAGNOSIS — K31.84 GASTROPARESIS: ICD-10-CM

## 2017-02-20 DIAGNOSIS — M54.6 THORACIC BACK PAIN, UNSPECIFIED BACK PAIN LATERALITY, UNSPECIFIED CHRONICITY: ICD-10-CM

## 2017-02-20 DIAGNOSIS — M53.2X6 LUMBAR SPINE INSTABILITY: ICD-10-CM

## 2017-02-20 PROCEDURE — 72158 MRI LUMBAR SPINE W/O & W/DYE: CPT | Mod: TC

## 2017-02-20 PROCEDURE — 63600175 PHARM REV CODE 636 W HCPCS: Performed by: RADIOLOGY

## 2017-02-20 PROCEDURE — 25500020 PHARM REV CODE 255: Performed by: INTERNAL MEDICINE

## 2017-02-20 PROCEDURE — A9585 GADOBUTROL INJECTION: HCPCS | Performed by: INTERNAL MEDICINE

## 2017-02-20 PROCEDURE — 72158 MRI LUMBAR SPINE W/O & W/DYE: CPT | Mod: 26,,, | Performed by: RADIOLOGY

## 2017-02-20 PROCEDURE — 72157 MRI CHEST SPINE W/O & W/DYE: CPT | Mod: TC

## 2017-02-20 PROCEDURE — 72157 MRI CHEST SPINE W/O & W/DYE: CPT | Mod: 26,,, | Performed by: RADIOLOGY

## 2017-02-20 RX ORDER — MIDAZOLAM HYDROCHLORIDE 1 MG/ML
2 INJECTION, SOLUTION INTRAMUSCULAR; INTRAVENOUS ONCE
Status: COMPLETED | OUTPATIENT
Start: 2017-02-20 | End: 2017-02-20

## 2017-02-20 RX ORDER — SUCRALFATE 1 G/1
1 TABLET ORAL 4 TIMES DAILY
Qty: 120 TABLET | Refills: 6 | Status: SHIPPED | OUTPATIENT
Start: 2017-02-20 | End: 2017-08-28 | Stop reason: SDUPTHER

## 2017-02-20 RX ORDER — SUCRALFATE 1 G/1
1 TABLET ORAL 4 TIMES DAILY
Qty: 120 TABLET | Refills: 6 | Status: SHIPPED | OUTPATIENT
Start: 2017-02-20 | End: 2017-03-06 | Stop reason: SDUPTHER

## 2017-02-20 RX ORDER — GADOBUTROL 604.72 MG/ML
7 INJECTION INTRAVENOUS
Status: COMPLETED | OUTPATIENT
Start: 2017-02-20 | End: 2017-02-20

## 2017-02-20 RX ADMIN — GADOBUTROL 7 ML: 604.72 INJECTION INTRAVENOUS at 03:02

## 2017-02-20 RX ADMIN — MIDAZOLAM HYDROCHLORIDE 2 MG: 1 INJECTION, SOLUTION INTRAMUSCULAR; INTRAVENOUS at 03:02

## 2017-02-20 NOTE — PROGRESS NOTES
2mg of Versed given for anxiolysis. Verified allergies with patient. Verified patient has ride home. Patient left in care of MRI staff.

## 2017-02-22 ENCOUNTER — TELEPHONE (OUTPATIENT)
Dept: RHEUMATOLOGY | Facility: CLINIC | Age: 57
End: 2017-02-22

## 2017-02-23 DIAGNOSIS — M06.9 RHEUMATOID ARTHRITIS, INVOLVING UNSPECIFIED SITE, UNSPECIFIED RHEUMATOID FACTOR PRESENCE: ICD-10-CM

## 2017-02-23 RX ORDER — PREDNISONE 5 MG/1
TABLET ORAL
Qty: 45 TABLET | Refills: 1 | Status: SHIPPED | OUTPATIENT
Start: 2017-02-23 | End: 2017-04-26 | Stop reason: SDUPTHER

## 2017-02-27 DIAGNOSIS — J30.89 PERENNIAL ALLERGIC RHINITIS: ICD-10-CM

## 2017-02-27 RX ORDER — MONTELUKAST SODIUM 10 MG/1
TABLET ORAL
Qty: 30 TABLET | Refills: 1 | Status: SHIPPED | OUTPATIENT
Start: 2017-02-27 | End: 2017-04-25 | Stop reason: SDUPTHER

## 2017-03-06 ENCOUNTER — OFFICE VISIT (OUTPATIENT)
Dept: PULMONOLOGY | Facility: CLINIC | Age: 57
End: 2017-03-06
Payer: COMMERCIAL

## 2017-03-06 VITALS
SYSTOLIC BLOOD PRESSURE: 116 MMHG | HEIGHT: 61 IN | BODY MASS INDEX: 28.97 KG/M2 | DIASTOLIC BLOOD PRESSURE: 68 MMHG | WEIGHT: 153.44 LBS | HEART RATE: 107 BPM | OXYGEN SATURATION: 98 %

## 2017-03-06 DIAGNOSIS — J30.1 NON-SEASONAL ALLERGIC RHINITIS DUE TO POLLEN: ICD-10-CM

## 2017-03-06 DIAGNOSIS — K21.9 GASTROESOPHAGEAL REFLUX DISEASE WITHOUT ESOPHAGITIS: ICD-10-CM

## 2017-03-06 DIAGNOSIS — J45.21 CHRONIC ASTHMA, MILD INTERMITTENT, WITH ACUTE EXACERBATION: Primary | ICD-10-CM

## 2017-03-06 PROCEDURE — 99999 PR PBB SHADOW E&M-EST. PATIENT-LVL III: CPT | Mod: PBBFAC,,, | Performed by: INTERNAL MEDICINE

## 2017-03-06 PROCEDURE — 1160F RVW MEDS BY RX/DR IN RCRD: CPT | Mod: S$GLB,,, | Performed by: INTERNAL MEDICINE

## 2017-03-06 PROCEDURE — 99214 OFFICE O/P EST MOD 30 MIN: CPT | Mod: S$GLB,,, | Performed by: INTERNAL MEDICINE

## 2017-03-06 RX ORDER — AZITHROMYCIN 250 MG/1
TABLET, FILM COATED ORAL
Qty: 1 TABLET | Refills: 0 | Status: SHIPPED | OUTPATIENT
Start: 2017-03-06 | End: 2017-05-26 | Stop reason: CLARIF

## 2017-03-06 NOTE — PROGRESS NOTES
"Subjective:       Patient ID: Joyce Peterson is a 56 y.o. female.    Chief Complaint: Asthma    HPI Comments: 3 month follow up for uncontrolled asthma.  Was doing well until around three to four days when she began with chest tightness and dyspnea on exertion.  Allergy/sinus symptoms have been flaring for two weeks.  Intermittent wheeze with cough productive of mucous plugging.  Started using albuterol this weekend with improvement of symptoms up until this point had become conrtolled on symbicort, astelin, singulair, flonase and clartin (all antihistamines cause excessive drowsiness).      Asthma   Pertinent negatives include no fever. Her past medical history is significant for asthma.     Review of Systems   Constitutional: Negative for fever.       Objective:       Vitals:    03/06/17 1139   BP: 116/68   Pulse: 107   SpO2: 98%   Weight: 69.6 kg (153 lb 7 oz)   Height: 5' 1" (1.549 m)     Physical Exam   Constitutional: She is oriented to person, place, and time. She appears well-developed and well-nourished.   Cardiovascular: Normal rate and regular rhythm.    Pulmonary/Chest: Normal expansion. She has no decreased breath sounds. She has no wheezes. She exhibits no tenderness.   Musculoskeletal: Normal range of motion. She exhibits no edema.   Neurological: She is alert and oriented to person, place, and time.   Psychiatric: She has a normal mood and affect.        Personal Diagnostic Review  Peak flow:  450 which is over 100% predicted (386)  No flowsheet data found.      Assessment:       1. Chronic asthma, mild intermittent, with acute exacerbation    2. Non-seasonal allergic rhinitis due to pollen    3. Gastroesophageal reflux disease without esophagitis        Outpatient Encounter Prescriptions as of 3/6/2017   Medication Sig Dispense Refill    albuterol 90 mcg/actuation inhaler Inhale 2 puffs into the lungs every 6 (six) hours as needed for Wheezing. 18 g 11    aspirin 81 mg Tab Take 81 mg by mouth " every morning.       azelastine (ASTELIN) 137 mcg nasal spray 1 spray (137 mcg total) by Nasal route 2 (two) times daily. (Patient taking differently: 1 spray by Nasal route 2 (two) times daily as needed. ) 30 mL 11    budesonide-formoterol 160-4.5 mcg (SYMBICORT) 160-4.5 mcg/actuation HFAA Inhale 2 puffs into the lungs every 4 to 6 hours as needed. 1 Inhaler 12    calcium citrate-vitamin D (CITRACAL + D) 315-200 mg-unit per tablet Take 1 tablet by mouth 2 (two) times daily.       CRESTOR 20 mg tablet Take 1 tablet (20 mg total) by mouth once daily. 90 tablet 3    CYCLOSPORINE (RESTASIS OPHT) Inject 0.05 % into the eye. 1 Dropperette Both eyes Twice a day .  dispense one month supply/sixty-four vials/ two trays      fish oil-omega-3 fatty acids 300-1,000 mg capsule Take 1,400 g by mouth once daily.      flaxseed oil 1,000 mg Cap Take by mouth once daily.      GUAIFENESIN (MUCINEX ORAL) Take 400 mg by mouth every 4 (four) hours as needed.       INV PROPULSID 10 MG Take 20 mg by mouth 4 (four) times daily as directed by physician.  For investigational use only 480 each 2    leflunomide (ARAVA) 20 MG Tab Take 1 tablet (20 mg total) by mouth once daily. 30 tablet 2    montelukast (SINGULAIR) 10 mg tablet TAKE 1 TABLET AT BEDTIME 30 tablet 1    naproxen (NAPROSYN) 500 MG tablet TAKE 1 TABLET TWICE A DAY 60 tablet 1    omeprazole (PRILOSEC) 40 MG capsule TAKE ONE CAPSULE BY MOUTH EVERY MORNING 30 capsule 6    omeprazole-sodium bicarbonate (ZEGERID) 40-1.1 mg-gram per capsule Take 1 capsule by mouth every morning. 30 capsule 11    POTASSIUM ORAL Take by mouth once daily.      predniSONE (DELTASONE) 5 MG tablet TAKE 1 & 1/2 TABS EVERY DAY 45 tablet 1    progesterone (PROMETRIUM) 100 MG capsule Take 100 mg by mouth every morning. 1 Capsule Oral Every day, morning      sucralfate (CARAFATE) 1 gram tablet Take 1 tablet (1 g total) by mouth 4 (four) times daily. 120 tablet 6    tofacitinib (XELJANZ XR) 11 mg  Tb24 Take 1 tablet by mouth once daily. 30 tablet 1    VESICARE 10 mg tablet Take 1 tablet by mouth every morning.       vitamin E 1000 UNIT capsule Take 1,000 Units by mouth once daily.      [DISCONTINUED] predniSONE (DELTASONE) 5 MG tablet Take 1 tablet (5 mg total) by mouth once daily. 90 tablet 1    azithromycin (ZITHROMAX Z-DEVON) 250 MG tablet 2 tablets for the first day then one tablet daily until ZPAK complete. 1 tablet 0    azithromycin (ZITHROMAX Z-DEVON) 250 MG tablet 2 tablets for the first day then one tablet daily until ZPAK complete. 1 tablet 0    promethazine (PHENERGAN) 12.5 MG Tab Take 1 tablet (12.5 mg total) by mouth 4 (four) times daily. 30 tablet 0    rizatriptan (MAXALT) 10 MG tablet Take 10 mg by mouth as needed for Migraine.       [DISCONTINUED] estrogens, conjugated, (PREMARIN) 0.625 MG tablet Take 0.625 mg by mouth every evening. 1 Tablet Oral Every day, at bedtime      [DISCONTINUED] RESTASIS 0.05 % ophthalmic emulsion       [DISCONTINUED] sucralfate (CARAFATE) 1 gram tablet Take 1 tablet (1 g total) by mouth 4 (four) times daily. 120 tablet 6     No facility-administered encounter medications on file as of 3/6/2017.      No orders of the defined types were placed in this encounter.    Plan:       With peak flow normal and without wheezing do not feel that corticosteroid burst is necessary.  Increase albuterol to every 6-8 hours.  Zpak for five days for antiinflammatory effect.    Continue current regimen.    RTC in three months.

## 2017-03-08 ENCOUNTER — TELEPHONE (OUTPATIENT)
Dept: ORTHOPEDICS | Facility: CLINIC | Age: 57
End: 2017-03-08

## 2017-03-08 DIAGNOSIS — R52 PAIN: Primary | ICD-10-CM

## 2017-03-08 NOTE — TELEPHONE ENCOUNTER
Joyce Peterson reminded of appointment on 3/9/17 with Dr. SERJIO Yarbrough w/time and location. Notified of need for xray before OV w/date, time, and location of appts.  Pt verbalized understanding.

## 2017-03-09 ENCOUNTER — HOSPITAL ENCOUNTER (OUTPATIENT)
Dept: RADIOLOGY | Facility: OTHER | Age: 57
Discharge: HOME | End: 2017-03-09
Attending: ORTHOPAEDIC SURGERY
Payer: COMMERCIAL

## 2017-03-09 ENCOUNTER — TELEPHONE (OUTPATIENT)
Dept: PHARMACY | Facility: CLINIC | Age: 57
End: 2017-03-09

## 2017-03-09 ENCOUNTER — INITIAL CONSULT (OUTPATIENT)
Dept: ORTHOPEDICS | Facility: CLINIC | Age: 57
End: 2017-03-09
Payer: COMMERCIAL

## 2017-03-09 VITALS
DIASTOLIC BLOOD PRESSURE: 74 MMHG | BODY MASS INDEX: 28.97 KG/M2 | SYSTOLIC BLOOD PRESSURE: 117 MMHG | HEIGHT: 61 IN | HEART RATE: 93 BPM | WEIGHT: 153.44 LBS

## 2017-03-09 DIAGNOSIS — R52 PAIN: ICD-10-CM

## 2017-03-09 DIAGNOSIS — G56.01 CARPAL TUNNEL SYNDROME OF RIGHT WRIST: Primary | ICD-10-CM

## 2017-03-09 PROCEDURE — 99999 PR PBB SHADOW E&M-EST. PATIENT-LVL III: CPT | Mod: PBBFAC,,, | Performed by: ORTHOPAEDIC SURGERY

## 2017-03-09 PROCEDURE — 99214 OFFICE O/P EST MOD 30 MIN: CPT | Mod: 25,S$GLB,ICN, | Performed by: ORTHOPAEDIC SURGERY

## 2017-03-09 PROCEDURE — 1160F RVW MEDS BY RX/DR IN RCRD: CPT | Mod: S$GLB,ICN,, | Performed by: ORTHOPAEDIC SURGERY

## 2017-03-09 PROCEDURE — 20526 THER INJECTION CARP TUNNEL: CPT | Mod: RT,S$GLB,ICN, | Performed by: ORTHOPAEDIC SURGERY

## 2017-03-09 PROCEDURE — 73110 X-RAY EXAM OF WRIST: CPT | Mod: 26,50,, | Performed by: RADIOLOGY

## 2017-03-09 PROCEDURE — 73110 X-RAY EXAM OF WRIST: CPT | Mod: 50,TC

## 2017-03-09 RX ADMIN — DEXAMETHASONE SODIUM PHOSPHATE 4 MG: 4 INJECTION, SOLUTION INTRA-ARTICULAR; INTRALESIONAL; INTRAMUSCULAR; INTRAVENOUS; SOFT TISSUE at 10:03

## 2017-03-09 NOTE — LETTER
March 13, 2017      Isiah Moran MD  [address]  [place] [address]           [facility]  [address]  [address]  [place] [address]  Phone: [phone]          Patient: Joyce Peterson   MR Number: [id number]   YOB: 1960   Date of Visit: 3/9/2017       Dear Dr. Isiah Moran:    Thank you for referring Joyce Peterson to me for evaluation. Attached you will find relevant portions of my assessment and plan of care.    If you have questions, please do not hesitate to call me. I look forward to following Joyce Peterson along with you.    Sincerely,    Staci Yarbrough MD    Enclosure  CC:  No Recipients    If you would like to receive this communication electronically, please contact [email or web address] or [phone] to request more information on [facility] access.    For providers and/or their staff who would like to refer a patient to [facility], please contact us through our one-stop-shop provider referral line, [facility], at [phone].    If you feel you have received this communication in error or would no longer like to receive these types of communications, please e-mail [email or web address]

## 2017-03-09 NOTE — MR AVS SNAPSHOT
[facility]  [address]  [address]  [facility] [address]  Phone: [phone]                  Joyce Peterson   3/9/2017 10:00 AM   Initial consult    Description:  Female : 1960   Provider:  Staci Yarbrough MD   Department:  [facility]           Reason for Visit     Right Hand - Numbness, Pain     Left Hand - Pain           Diagnoses this Visit        Comments    Carpal tunnel syndrome of right wrist    -  Primary            To Do List           Future Appointments        Provider Department Dept Phone    3/15/2017 1:00 PM Isiah Moran MD [facility] Rheumatology [phone]    2017 10:30 AM LAB, [facility] [phone]    5/10/2017 2:30 PM Isiah Moran MD [facility] Rheumatology [phone]    2017 11:30 AM Lani Laguerre MD [facility] Pulmonary Services [phone]      Goals (5 Years of Data)     None      [facility]     [facility] Nurse Care Line -  Assistance  Registered nurses in the [facility] provide clinical advisement, health education, appointment booking, and other advisory services.  Call for this free service at [phone].             Medications           Message regarding Medications     Verify the changes and/or additions to your medication regime listed below are the same as discussed with your clinician today.  If any of these changes or additions are incorrect, please notify your healthcare provider.             Verify that the below list of medications is an accurate representation of the medications you are currently taking.  If none reported, the list may be blank. If incorrect, please contact your healthcare provider. Carry this list with you in case of emergency.           Current Medications     albuterol 90 mcg/actuation inhaler Inhale 2 puffs into the lungs every 6 (six) hours as needed for Wheezing.    aspirin 81 mg Tab Take 81 mg by mouth every morning.      azelastine (ASTELIN) 137 mcg nasal spray 1 spray (137 mcg total) by Nasal route 2 (two) times daily.    azithromycin (ZITHROMAX Z-DEVON) 250 MG tablet 2 tablets for the first day then one tablet daily until ZPAK complete.    azithromycin (ZITHROMAX Z-DEVON) 250 MG tablet 2 tablets for the first day then one tablet daily until ZPAK complete.    budesonide-formoterol 160-4.5 mcg (SYMBICORT) 160-4.5 mcg/actuation HFAA Inhale 2 puffs into the lungs every 4 to 6 hours as needed.    calcium citrate-vitamin D (CITRACAL + D) 315-200 mg-unit per tablet Take 1 tablet by mouth 2 (two) times daily.     CRESTOR 20 mg tablet Take 1 tablet (20 mg total) by mouth once daily.    CYCLOSPORINE (RESTASIS OPHT) Inject 0.05 % into the eye. 1 Dropperette Both eyes Twice a day .  dispense one month supply/sixty-four vials/ two trays    fish oil-omega-3 fatty acids 300-1,000 mg capsule Take 1,400 g by mouth once daily.    flaxseed oil 1,000 mg Cap Take by mouth once daily.    GUAIFENESIN (MUCINEX ORAL) Take 400 mg by mouth every 4 (four) hours as needed.     INV PROPULSID 10 MG Take 20 mg by mouth 4 (four) times daily as directed by physician.  For investigational use only    leflunomide (ARAVA) 20 MG Tab Take 1 tablet (20 mg total) by mouth once daily.    montelukast (SINGULAIR) 10 mg tablet TAKE 1 TABLET AT BEDTIME    naproxen (NAPROSYN) 500 MG tablet TAKE 1 TABLET TWICE A DAY    omeprazole (PRILOSEC) 40 MG capsule TAKE ONE CAPSULE BY MOUTH EVERY MORNING    omeprazole-sodium bicarbonate (ZEGERID) 40-1.1 mg-gram per capsule Take 1 capsule by mouth every morning.    POTASSIUM ORAL Take by mouth once daily.    predniSONE (DELTASONE) 5 MG tablet TAKE 1 & 1/2 TABS EVERY DAY    progesterone (PROMETRIUM) 100 MG capsule Take 100 mg by mouth every morning. 1 Capsule Oral Every day, morning    promethazine (PHENERGAN) 12.5 MG Tab Take 1 tablet (12.5 mg total) by mouth 4 (four) times daily.    rizatriptan (MAXALT) 10 MG tablet Take 10 mg by mouth as  "needed for Migraine.     sucralfate (CARAFATE) 1 gram tablet Take 1 tablet (1 g total) by mouth 4 (four) times daily.    tofacitinib (XELJANZ XR) 11 mg Tb24 Take 1 tablet by mouth once daily.    VESICARE 10 mg tablet Take 1 tablet by mouth every morning.     vitamin E 1000 UNIT capsule Take 1,000 Units by mouth once daily.           Clinical Reference Information           Your Vitals Were     BP Pulse Height Weight BMI    117/74 (BP Location: Right arm) 93 5' 1" (1.549 m) 69.6 kg (153 lb 7 oz) 28.99 kg/m2      Blood Pressure          Most Recent Value    BP  117/74      Allergies as of 3/9/2017     Actemra [Tocilizumab]    Codeine    Gold Au 198    Hydroxychloroquine    Iodinated Contrast Media - Iv Dye    Iodine    Sulfa (Sulfonamide Antibiotics)    Zofran [Ondansetron Hcl (Pf)]    Methotrexate Analogues    Pneumovax 23 [Pneumococcal 23-argenis Ps Vaccine]      Immunizations Administered on Date of Encounter - 3/9/2017     None      Orders Placed During Today's Visit      Normal Orders This Visit    Ambulatory Referral to Physical/Occupational Therapy     Carpal Tunnel       Language Assistance Services     ATTENTION: Language assistance services are available, free of charge. Please call [phone].      ATENCIÓN: Si habla mirta, tiene a hernández disposición servicios gratuitos de asistencia lingüística. Llame al [phone].     [facility] Ý: N?u b?n nói Ti?ng Vi?t, có các d?ch v? h? tr? ngôn ng? mi?n phí dành cho b?n. G?i s? [phone].         [facility] complies with applicable Federal civil rights laws and does not discriminate on the basis of race, color, national origin, age, disability, or sex.        "

## 2017-03-09 NOTE — PROCEDURES
Carpal Tunnel  Date/Time: 3/9/2017 10:14 AM  Performed by: RACHEL SEHTH  Authorized by: RACHEL SHETH     Consent Done?:  Yes (Verbal)  Indications:  Pain  Timeout: Prior to procedure the correct patient, procedure, and site was verified      Location:  Wrist  Site:  R radiocarpal  Needle size:  25 G  Approach:  Volar  Medications:  4 mg dexamethasone 4 mg/mL

## 2017-03-15 ENCOUNTER — TELEPHONE (OUTPATIENT)
Dept: RHEUMATOLOGY | Facility: CLINIC | Age: 57
End: 2017-03-15

## 2017-03-15 ENCOUNTER — OFFICE VISIT (OUTPATIENT)
Dept: RHEUMATOLOGY | Facility: CLINIC | Age: 57
End: 2017-03-15
Payer: COMMERCIAL

## 2017-03-15 VITALS
BODY MASS INDEX: 26.29 KG/M2 | HEIGHT: 64 IN | WEIGHT: 154 LBS | SYSTOLIC BLOOD PRESSURE: 126 MMHG | HEART RATE: 102 BPM | DIASTOLIC BLOOD PRESSURE: 77 MMHG

## 2017-03-15 DIAGNOSIS — K21.9 GASTROESOPHAGEAL REFLUX DISEASE WITHOUT ESOPHAGITIS: ICD-10-CM

## 2017-03-15 DIAGNOSIS — K31.84 GASTROPARESIS: ICD-10-CM

## 2017-03-15 DIAGNOSIS — D84.9 IMMUNOSUPPRESSED STATUS: ICD-10-CM

## 2017-03-15 DIAGNOSIS — M85.80 OSTEOPENIA: Primary | ICD-10-CM

## 2017-03-15 DIAGNOSIS — M47.816 SPONDYLOSIS OF LUMBAR REGION WITHOUT MYELOPATHY OR RADICULOPATHY: ICD-10-CM

## 2017-03-15 DIAGNOSIS — M19.90 OSTEOARTHRITIS, UNSPECIFIED OSTEOARTHRITIS TYPE, UNSPECIFIED SITE: ICD-10-CM

## 2017-03-15 PROCEDURE — 99213 OFFICE O/P EST LOW 20 MIN: CPT | Mod: S$GLB,,, | Performed by: INTERNAL MEDICINE

## 2017-03-15 PROCEDURE — 99999 PR PBB SHADOW E&M-EST. PATIENT-LVL IV: CPT | Mod: PBBFAC,,, | Performed by: INTERNAL MEDICINE

## 2017-03-15 PROCEDURE — 1160F RVW MEDS BY RX/DR IN RCRD: CPT | Mod: S$GLB,,, | Performed by: INTERNAL MEDICINE

## 2017-03-15 ASSESSMENT — ROUTINE ASSESSMENT OF PATIENT INDEX DATA (RAPID3)
PSYCHOLOGICAL DISTRESS SCORE: 2.2
TOTAL RAPID3 SCORE: 7.33
AM STIFFNESS SCORE: 0, NO
FATIGUE SCORE: 6.5
PATIENT GLOBAL ASSESSMENT SCORE: 8
MDHAQ FUNCTION SCORE: 1.8
PAIN SCORE: 8

## 2017-03-15 NOTE — PROGRESS NOTES
I have personally taken the history and examined the patient and agree with the resident,s note as stated above. Here to discuss osteopenia, GIOP prevention. Currently on prednisone 7mg daily       Results for TONNY GOMEZ (MRN [id number]) as of 3/15/2017 13:10   Ref. Range 9/28/2015 10:50   Vit D, 25-Hydroxy Latest Ref Range: 30 - 96 ng/mL 31   Results for TONNY GOMEZ (MRN [id number]) as of 3/15/2017 13:10   Ref. Range 2/1/2017 13:32   Sodium Latest Ref Range: 136 - 145 mmol/L 135 (L)   Potassium Latest Ref Range: 3.5 - 5.1 mmol/L 4.3   Chloride Latest Ref Range: 95 - 110 mmol/L 105   CO2 Latest Ref Range: 23 - 29 mmol/L 24   Anion Gap Latest Ref Range: 8 - 16 mmol/L 6 (L)   BUN, Bld Latest Ref Range: 6 - 20 mg/dL 13   Creatinine Latest Ref Range: 0.5 - 1.4 mg/dL 1.0   eGFR if non African American Latest Ref Range: >60 mL/min/1.73 m^2 >60.0   eGFR if African American Latest Ref Range: >60 mL/min/1.73 m^2 >60.0   Glucose Latest Ref Range: 70 - 110 mg/dL 190 (H)   Calcium Latest Ref Range: 8.7 - 10.5 mg/dL 8.5 (L)   Alkaline Phosphatase Latest Ref Range: 55 - 135 U/L 59   Total Protein Latest Ref Range: 6.0 - 8.4 g/dL 6.6   Albumin Latest Ref Range: 3.5 - 5.2 g/dL 3.5   Total Bilirubin Latest Ref Range: 0.1 - 1.0 mg/dL 0.2   AST Latest Ref Range: 10 - 40 U/L 22   ALT Latest Ref Range: 10 - 44 U/L 15   CRP Latest Ref Range: 0.0 - 8.2 mg/L 0.5   Results for TONNY GOMEZ (MRN [id number]) as of 3/15/2017 13:10   Ref. Range 3/31/2016 11:24   TSH Latest Ref Range: 0.400 - 4.000 uIU/mL 0.808       Please tell pt the bone density shows osteopenia which should be treated given her current dose of prednisone. Please schedule brief f/u visit to discuss in next several weeks. Thanks. RJQ          Signed by   Signed Credentials Date/Time    Phone Pager   VARUN LAURA MD 2/21/2017 07:50 [phone]    PACS Images   Show images for DXA Bone Density Spine And Hip_Axial Skeleton   Reviewed By Asaf ROE  MD Luisa on 2017  1:37 PM   Patient Result Comments   Entered by Isiah Moran MD at 2017  2:37 PM   Read by Joyce Peterson at 2017  4:35 PM   The bone density shows osteopenia which should be treated given your current dose of prednisone. Ronel will schedule brief visit to discuss therapy options for this. RJQ   External Result Report   External Result Report   Narrative   : 1960 ORDERING PHYSICIAN: JARON Moran LOCATION: [facility]    HISTORY: 55 y/o female with no hx of fractures. Pts Mother had a wrist fx. Pt is taking Calcium, Vit D, Estrogen and 7mg of Prednisone. She has a hx of Rheumatoid Arthritis and Asthma. She exercises 3 times a week and does not smoke.    TECHNIQUE: Bone Mineral Density performed using Hologic Horizon A (S/N [id number]) reveals good positioning of lumbar spine and hip.    BONE MINERAL DENSITY RESULTS:  Lumbar Spine: Lumbar bone mineral density L1-L4 is 0.819g/cm2, which is a t-score of -2.1. The z-score is -0.9.    Total Hip: The total hip bone mineral density is 0.868g/cm2.  The t-score is -0.6, and the z-score is 0.1.  Femoral neck BMD is 0.723g/cm2 and the t-score is -1.1.      COMPARISONS:  Date Location BMD T-score  12 L-spine 0.828 -2.0  Total Hip 0.923 -0.2   Impression    Low bone mass/osteopenia of the lumbar spine and femoral neck.  Decrease in hip BMD (-6%) since prior study. FRAX calculations do not suggest treatment.      Recommendations:  1) Adequate calcium and Vitamin D therapy  2) Appropriate exercise  3) Glucocorticoids may adversely affect quality and quantity of bone.    Consider bisphosphonate if patient requires prednisone 7.5 mg or greater for 3 months or more.  4) Consider repeat BMD in 1-2 years    EXPLANATION OF RESULTS:  The t-score compares this results to the bone density of a 25 year old of the same gender. The z-score compares this result to the average bone density to people of the same age and gender. The  amounts indicate the number of standard deviations above or below the mean.  * Osteoporosis is generally defined as having a t-score between less than -2.5.  * Osteopenia is generally defined as having a t-score between -1 and -2.5.  * The normal range is generally defined as having a t-score between -1 to 1.      Electronically signed by: VARUN LAURA MD  Date: 02/21/17  Time: 07:50     Encounter   View Encounter      Reviewed By   Isiah Moran MD on 2/21/2017  1:37 PM     FRAX: MOF 12%  Hip 0.9 %        Osteopenia with moderate fracture risk on prednisone 7mg daily  Gastroparesis, not a candidate for oral bisphosphonates, and will h/o hypocalcemia intermittently would prefer denosumab to zolendronic acid given duration of effect      TSH, iPTH, SIFE, vitamin D, cmp  24 hr urine TEODORA and calcium  Then plan proceed with denosumab

## 2017-03-15 NOTE — PROGRESS NOTES
"PROGRESS NOTE    Subjective:       Patient ID: Joyce Peterson is a 56 y.o. female.    Chief Complaint:  No chief complaint on file.      History of Present Illness:   Joyce Peterson is a 56 y.o. female who presents for follow up and to discuss GIOP prevention.     Pt was seen on 02/07/17 last and had a DXA done after the visit.  DXA showed osteopenia in the L spine and femoral neck.  She is here today to discuss therapies to prevent glucocorticoid induced osteoporosis.      Since last visit pt saw pulmonologist on 03/06/17 for regular check up for asthma.  She was having a cough and was given a zpack and stopped taking Xeljanz and leflunomide for 1 week.  She is finished her ABx and now back on her DMARDs.  She states that after stopping the medications her arthralgias got worse.  She denies any joint swelling or warmth.      ROS:   General: no fatigue, no unintentional weight loss,no fever or night sweats  HEENT: no oral lesions, no sicca symptoms  CV:no chest pain or pedal edema  RS: no cough, dyspnea or hemoptysis  GI:no GERD, no abdominal pain, no diarrhea or constipation  Neuro: no numbness or weakness  Psyc:no mood changes  Derm:no skin rash  Objective:     Physical Examination:  /77  Pulse 102  Ht 5' 3.6" (1.615 m)  Wt 69.9 kg (154 lb)  BMI 26.77 kg/m2  General: Well developed, well nourished  Skin: no rashes or lesions  HEENT: Normocephalic, atraumatic, No oral lesion, No cervical adenopathy  Chest   Lungs:  Clear to ausculation, bilaterally and normal respiratory effort   Heart: Regular rate and rhythm, S1, S2 normal, no murmurs  Abdomen: Soft, non-tender non-distented; bowel sounds normal   MSK:   Physical Exam       Right Side Rheumatological Exam     Examination finds the shoulder, elbow, wrist, knee, 1st PIP, 1st MCP, 2nd PIP, 2nd MCP, 3rd PIP, 3rd MCP, 4th PIP, 4th MCP, 5th PIP and 5th MCP normal.    Left Side Rheumatological Exam     Examination finds the shoulder, elbow, wrist, " knee, 1st PIP, 1st MCP, 2nd PIP, 2nd MCP, 3rd PIP, 3rd MCP, 4th PIP, 4th MCP, 5th PIP and 5th MCP normal.      Neuro: Normal strength- 5/5 all extremities  Psyc: Normal content and context of speech    Labs as follows:     Results for orders placed or performed in visit on 02/06/17   Hemoglobin A1c   Result Value Ref Range    Hemoglobin A1C 6.0 4.5 - 6.2 %    Estimated Avg Glucose 126 68 - 131 mg/dL   Glucose, fasting   Result Value Ref Range    Glucose, Fasting 78 70 - 110 mg/dL     *Note: Due to a large number of results and/or encounters for the requested time period, some results have not been displayed. A complete set of results can be found in Results Review.       Assessment/Plan:     Osteopenia  -     Vitamin D; Future; Expected date: 3/15/17  -     Comprehensive metabolic panel; Future; Expected date: 3/15/17  -     PTH, intact; Future; Expected date: 3/15/17  -     TSH; Future; Expected date: 3/15/17  -     Immunofixation electrophoresis; Future; Expected date: 3/15/17  -     Immunofixation, 24 hour Urine; Future  -     Calcium, Timed Urine; Future    Osteoarthritis, unspecified osteoarthritis type, unspecified site    Spondylosis of lumbar region without myelopathy or radiculopathy    Immunosuppressed status    Gastroparesis    Gastroesophageal reflux disease without esophagitis        Pt not candidate for oral bisphosphonate's due to history of gastroparesis and GERD    Will likely initiate therapy with Denosumab (Prolia) q6mo.  Need labs first with CMP, TSH, PTH, Vitamin D, 24h urinary Ca, SPEP, Immunofixation.    Continue Ca supplements, encouraged to increase dietary Ca in food.      Increase prednisone to 10mg for 3 days then 9 mg for 3 days then 8mg thereafter.      Continue current dose of Xeljanz and Leflunomide.

## 2017-03-16 ENCOUNTER — PATIENT MESSAGE (OUTPATIENT)
Dept: PULMONOLOGY | Facility: CLINIC | Age: 57
End: 2017-03-16

## 2017-03-16 DIAGNOSIS — D62 ANEMIA ASSOCIATED WITH ACUTE BLOOD LOSS: Primary | ICD-10-CM

## 2017-03-17 NOTE — TELEPHONE ENCOUNTER
Mrs. Peterson,  This does not sound like it is a pulmonary problem but a sinus ENT problem.  I suggest that you schedule an appointment with otolaryngology.  Sinus symptoms can certainly trigger you to feel like you have breathing issues.   Please do not hesitate to contact my office with additional questions and/or concerns.  Dr. Lani Laguerre    ===View-only below this line===      ----- Message -----     From: Joyce Peterson     Sent: 3/16/2017  5:15 PM CDT       To: Lani Laguerre MD  Subject: Non-Urgent Medical    Dr. Laguerre, I'm sorry I took so long to get back to you. I finished the antibiotics. When I started the antibiotics I started feeling better. My congestion and breathing got better. However, it's been a week since I finished the Z-pack and my symtoms are back. I have the headaches, my left ear hurts, my gland down the left side of my neck hurts, head congestion and very short of breath. I'm not sure what to do now. If you have any suggestions please let me know. Thank you!  Joyce Peterson

## 2017-03-20 RX ORDER — DEXAMETHASONE SODIUM PHOSPHATE 4 MG/ML
4 INJECTION, SOLUTION INTRA-ARTICULAR; INTRALESIONAL; INTRAMUSCULAR; INTRAVENOUS; SOFT TISSUE
Status: DISCONTINUED | OUTPATIENT
Start: 2017-03-09 | End: 2017-03-20 | Stop reason: HOSPADM

## 2017-03-21 NOTE — PROGRESS NOTES
REFERRING PHYSICIAN:  Isiah Moran M.D.    CHIEF COMPLAINT:  Carpal tunnel.    HISTORY OF PRESENT ILLNESS:  Ms. Peterson is a 56-year-old female that   complains of bilateral carpal tunnel syndrome, right is more severe.  She states   it has been going on for years.  It came and went, more severe now.  The   numbness is on and off in the morning and the afternoon and at night.  Middle of   the day is really okay.  She states the numbness will be in all 5 fingers.  She   also has elbow pain.  She feels that she has weakness doing different tasks.    No neck pain.  She did use a brace, which did help.  She states she stays at   home for work and she states cooking is difficult.    PAST MEDICAL HISTORY, SOCIAL HISTORY, SURGICAL HISTORY AND REVIEW OF SYSTEMS:    Per electronic medical record.    PHYSICAL EXAMINATION:  VITAL SIGNS:  Please see computer entry.  GENERAL:  Alert and oriented x3, well developed, well nourished, in no apparent   distress, friendly individual, well groomed, appears stated age.  EXTREMITIES:  On examination of bilateral upper extremities, skin is clean, dry   and intact.  No swelling.  Median, radial, ulnar, anterior interosseous,   posterior interosseous, motor and sensation to light touch intact.  Brisk   capillary refill.  Positive Tinel's.  Positive nerve compression test.    EMG was reviewed, which was in the system.     PLAN:  For this patient, we discussed at length the treatment options.  At that   time, she does not want to pursue surgical treatment, so we will perform an   injection and therapy today.  All questions were answered for the patient.    Please see procedure note.      LES/HN  dd: 03/20/2017 18:04:46 (CDT)  td: 03/21/2017 10:33:53 (CDT)  Doc ID   #[id number]  Job ID #[id number]    CC:

## 2017-03-27 ENCOUNTER — CLINICAL SUPPORT (OUTPATIENT)
Dept: REHABILITATION | Facility: HOSPITAL | Age: 57
End: 2017-03-27
Attending: ORTHOPAEDIC SURGERY
Payer: COMMERCIAL

## 2017-03-27 ENCOUNTER — PATIENT MESSAGE (OUTPATIENT)
Dept: RHEUMATOLOGY | Facility: CLINIC | Age: 57
End: 2017-03-27

## 2017-03-27 DIAGNOSIS — G56.01 CARPAL TUNNEL SYNDROME OF RIGHT WRIST: ICD-10-CM

## 2017-03-27 PROCEDURE — 97165 OT EVAL LOW COMPLEX 30 MIN: CPT

## 2017-03-27 PROCEDURE — 97035 APP MDLTY 1+ULTRASOUND EA 15: CPT

## 2017-03-27 PROCEDURE — G8987 SELF CARE CURRENT STATUS: HCPCS | Mod: CK

## 2017-03-27 PROCEDURE — G8988 SELF CARE GOAL STATUS: HCPCS | Mod: CJ

## 2017-03-27 PROCEDURE — 97110 THERAPEUTIC EXERCISES: CPT

## 2017-03-27 RX ORDER — NAPROXEN 500 MG/1
TABLET ORAL
Qty: 60 TABLET | Refills: 1 | Status: SHIPPED | OUTPATIENT
Start: 2017-03-27 | End: 2017-05-10 | Stop reason: SDUPTHER

## 2017-03-27 NOTE — PLAN OF CARE
"OCCUPATIONAL THERAPY INITIAL EVALUATION       DATE : 03/27/2017    Name: Joyce Peterson  Referring Physician: Staci Yarbrough, *   Allergies:   Review of patient's allergies indicates:   Allergen Reactions    Actemra [tocilizumab] Other (See Comments)     Severe dizziness    Codeine Nausea And Vomiting    Gold au 198 Hives and Rash    Hydroxychloroquine Other (See Comments)     Can't remember the reaction      Iodinated contrast media - iv dye Other (See Comments)     Other reaction(s): BURNING ALL OVER    Iodine Other (See Comments)     Other reaction(s): BURNING ALL OVER - Iodine dye - Not topical    Sulfa (sulfonamide antibiotics) Other (See Comments)     Can't remember the reaction    Zofran [ondansetron hcl (pf)] Nausea And Vomiting     Pt reports that last time she received zofran she started vomiting again    Methotrexate analogues Nausea Only    Pneumovax 23 [pneumococcal 23-argenis ps vaccine] Other (See Comments)     "sick"     Medical history:   Past Medical History:   Diagnosis Date    Acid reflux     Allergy     Anemia     Asthma     Coronary artery disease     Degenerative disc disease     Dry eyes     Dry mouth     Gastroparesis     Hyperlipidemia     Lateral meniscus derangement 4/6/2016    Osteoarthritis     Osteoporosis     Rheumatoid arthritis(714.0)     Umbilical hernia 8/13/2015     Medication:    Current Outpatient Prescriptions on File Prior to Visit   Medication Sig Dispense Refill    albuterol 90 mcg/actuation inhaler Inhale 2 puffs into the lungs every 6 (six) hours as needed for Wheezing. 18 g 11    aspirin 81 mg Tab Take 81 mg by mouth every morning.       azelastine (ASTELIN) 137 mcg nasal spray 1 spray (137 mcg total) by Nasal route 2 (two) times daily. (Patient taking differently: 1 spray by Nasal route 2 (two) times daily as needed. ) 30 mL 11    azithromycin (ZITHROMAX Z-DEVON) 250 MG tablet 2 tablets for the first day then one tablet daily until ZPAK " complete. 1 tablet 0    azithromycin (ZITHROMAX Z-DEVON) 250 MG tablet 2 tablets for the first day then one tablet daily until ZPAK complete. 1 tablet 0    budesonide-formoterol 160-4.5 mcg (SYMBICORT) 160-4.5 mcg/actuation HFAA Inhale 2 puffs into the lungs every 4 to 6 hours as needed. 1 Inhaler 12    calcium citrate-vitamin D (CITRACAL + D) 315-200 mg-unit per tablet Take 1 tablet by mouth 2 (two) times daily.       CRESTOR 20 mg tablet Take 1 tablet (20 mg total) by mouth once daily. 90 tablet 3    CYCLOSPORINE (RESTASIS OPHT) Inject 0.05 % into the eye. 1 Dropperette Both eyes Twice a day .  dispense one month supply/sixty-four vials/ two trays      fish oil-omega-3 fatty acids 300-1,000 mg capsule Take 1,400 g by mouth once daily.      flaxseed oil 1,000 mg Cap Take by mouth once daily.      GUAIFENESIN (MUCINEX ORAL) Take 400 mg by mouth every 4 (four) hours as needed.       INV PROPULSID 10 MG Take 20 mg by mouth 4 (four) times daily as directed by physician.  For investigational use only 480 each 2    leflunomide (ARAVA) 20 MG Tab Take 1 tablet (20 mg total) by mouth once daily. 30 tablet 2    montelukast (SINGULAIR) 10 mg tablet TAKE 1 TABLET AT BEDTIME 30 tablet 1    omeprazole (PRILOSEC) 40 MG capsule TAKE ONE CAPSULE BY MOUTH EVERY MORNING 30 capsule 6    omeprazole-sodium bicarbonate (ZEGERID) 40-1.1 mg-gram per capsule Take 1 capsule by mouth every morning. 30 capsule 11    POTASSIUM ORAL Take by mouth once daily.      predniSONE (DELTASONE) 5 MG tablet TAKE 1 & 1/2 TABS EVERY DAY 45 tablet 1    progesterone (PROMETRIUM) 100 MG capsule Take 100 mg by mouth every morning. 1 Capsule Oral Every day, morning      promethazine (PHENERGAN) 12.5 MG Tab Take 1 tablet (12.5 mg total) by mouth 4 (four) times daily. 30 tablet 0    rizatriptan (MAXALT) 10 MG tablet Take 10 mg by mouth as needed for Migraine.       sucralfate (CARAFATE) 1 gram tablet Take 1 tablet (1 g total) by mouth 4 (four)  times daily. 120 tablet 6    tofacitinib (XELJANZ XR) 11 mg Tb24 Take 1 tablet by mouth once daily. 30 tablet 1    VESICARE 10 mg tablet Take 1 tablet by mouth every morning.       vitamin E 1000 UNIT capsule Take 1,000 Units by mouth once daily.      [DISCONTINUED] naproxen (NAPROSYN) 500 MG tablet TAKE 1 TABLET TWICE A DAY 60 tablet 1     No current facility-administered medications on file prior to visit.      Diagnosis:No diagnosis found.  Occupational Profile level: low     Orders: Occupational Therapy evaluate and treat    Precautions stated on order: eval and treat      Evaluation Date: 3/2/7/17   Visit #: 1     Plan of care Expiration: 3/27/17     SUBJECTIVE   Patient states:  Pt reports that she has OA and RA for years , and carpal tunnel for years !! She told her Dr Hollis recommended surgery to address ayala delarosacharles , pt is not interested in surgery so patient requested therapy.     Pts goals for therapy:  To not wake up with her right hand totally numb   Pain Scale: Joyce rates pain on a scale of 0-10 to be 6 at worst; 6 currently; 3 at best .  Onset: gradual  Date of Surgery: none   Chief complaint:  Numbness in entire  right hand   Radicular symptoms:   Morning time worse for sensation   Aggravating factors:   None   Easing factors:  None   Prior Therapy: none   History of Present Illness: years of carapl tunnel , last 6 months it has gotten worse,  Prior functional status: normal  Current functional status:  Limited with opening jars and bottles ,  Has discussed with MD on prior visits that pt would need CMC arthroplasty to right hand however pt is not interested.       Psychosocial skills: normal   Occupation:  Retired                        Job description includes:  Household task, pain with vacumming and sweeping   Patients  structured exercise routine: none   Exercise routine prior to onset: none   ADLS: Pt has a decrease ability to perform ADL's such as pain with gripping     Imaging:    9/2016 EMG   CONCLUSION: COMPARISON TO STUDY PERFORMED ON 7/29/2013.   MODERATE CARPAL TUNNEL SYNDROME.   THERE HAS BEEN MILD PROGRESSION FROM THE PREVIOUS EXAM.          Xray: normal         Hand Dominance: right                                   OBJECTIVE     Cognitive status : : no deficits     Posture Alignment :normal   Joint integrity: normal   Skin integrity:normal   Edema: none   CMC right hand deformed limited thumb abduction     Sensation:   Light Touch: Intact   diminshed  Protective sensation to median and ulnar nerve distribution        Proprioception:   Intact        Upper Extremity Range of Motion   Joint Evaluation AROM PROM     Right  Left  / Right    Shoulder flex 0-180     Shoulder Abd 0-180     Shoulder ER 0-90     Shoulder IR 0-90     Shoulder Extension 0-80     Shoulder Horizontal adduction 0-90     Elbow flex/ext 0-150 WNL WNL   Wrist flex 0-80 0/20/ WNL   Wrist ext 0-70 0/25 arthritis  WNL   Supination 0-80 60 WNL   Pronation 0-80 70 WNL   UD 10 WNl   RD 10 WNL        right hand WFL range of digits .    Thumb limited motion   MP flexion 25   radial abduction 0/15/   Palmar abduction 0/10      Upper Extremity Strength  (R) UE  (L) UE                                        Supination 4/5    Supination  4+/5      Pronation 4/5    Pronation 4/5      Wrist flexion: 4/5 Wrist flexion: 4/5   Wrist extension: 4/5 Wrist extension: 4/5    Strength - second setting 60#,60#,60# : 60#,60#,50#                                       Treatment    Treatment time: 55  OT Evaluation Completed? Yes  Discussed Plan of Care with patient: Yes    Joyce received 50 minutes of therapeutic exercises.  Joyce received 10 minutes of ultrasound     Joyce received 1 eval    Written Home Exercises Provided:   Joyce demo good understanding of the education provided. Patient demo good return demo of skill of exercises.     Treatment Received :        1. Pt performed and was provided with a written copy of  exercises to perform as tolerated, including:   Ultrasound to carpal  tunnel 1.0 w/cm2 , 5 minutes and to medial forearm 5 minutes, median and ulnar nerve glides, instruction to bring wrist brace in to check fit and adjust in appropriate, instruction to order ulnar brace at night time to keep elbow straight,    And heelbow to wear during the day for protection .       2. Pt was provided educational information, including range of motion, strengthening   3. Pt educated to use ice for 10- 20 minutes to decrease swelling  and/ or pain as needed.       FOTO - Outcomes  And G Codes     FOTO survey     FOTO:   Self Care DATE SCORE GCODE MODIFIER   INITIAL 3/27/17      CK 53  /100  CK    5TH /100 10TH /100     Discharge           FOTO Goal : CJ modifier 8994    Interpretation of FOTO Modifiers    TEST SCORE  Modifier  Impairment Limitation Restriction    0%  CH  0 % impaired, limited or restricted    1-19%  CI  @ least 1% but less than 20% impaired, limited or restricted    20-39%  CJ  @ least 20%<40% impaired, limited or restricted    40-59%  CK  @ least 40%<60% impaired, limited or restricted    60-79%  CL  @ least 60% <80% impaired, limited or restricted    80-99%  CM  @ least 80%<100% impaired limited or restricted    100%  CN  100% impaired, limited or restricted       ASSESSMENT    Pt present to occupational therapy with an OT diagnosis of    Right carpal and cubital tunnel syndrome,       CLINICAL DECISION MAKING:    Analysis of data from eval:      Problem focused assessment, 1-3 performance deficits noted numbness in hand , dropping things ,       History Examination Decision Making Complexity Score   Occupational Profile: 56 year old , lives at home , pt has family that can assist pt was most task                             Medical and Therapy History:   Expanded                  Performance Deficits     Physical:  Decreased ability to perform task such as feeding/ cutting , use of  fasteners , dressing         Cognitive:   WNL        Psychosocial:    WNl        Modification of task during  evaluation     *Modification/task , no assistance required    *   Level of complexity is low,     based on PMHX, co morbidities , data from assessments and functional level of assistance required with task and clinical presentation directly impacting  His/ her plan of care                Pt presents with the impairments as stated below in the impairments/problems list. Pt prognosis is Good..     Joyce will benefit from skilled outpatient occupational therapy to address the above stated deficits, provide pt/family education and to maximize pt's level of independence in the home and community environment.     Discussed Plan of Care with patient: Yes    Medical necessity is demonstrated by the following IMPAIRMENTS/PROBLEMS:    2. pain in  joint / limb  Right / left   3. Decreased flexibility  4. Decreased ROM  5. Decreased muscle strength  6. Inability to work       Pt's spiritual, cultural and educational needs considered and pt agreeable to plan of care and goals as stated below:     Anticipated Barriers for Occupational  therapy: anticipated none    GOALS:  6  weeks. Pt agrees with goals set.  1. Independent with HEP.  2. Report decreased to pain  with adls such as reaching overhead and into the cabinets.  3. Decreased pain to   0/10    With gripping toothbrush or water bottle   4. Pt reports 2out 7 days she has no numbness in hands when she awakes   5.     PLAN   Outpatient occupational therapy 1-  2 times weekly to include:   pt ed, hep, therapeutic exercises, neuromuscular re-education, joint mobilizations,  Iontophoresis , ultrasound and other modalities prn  Treatment Certification Period: 3/27/17     To 5/30/17               .   Cont OT for  6  weeks.     I certify the need for these services furnished under this plan of treatment and while under my Care.    _________________________________,           _________________________  Physician        Date

## 2017-03-28 ENCOUNTER — PATIENT MESSAGE (OUTPATIENT)
Dept: ORTHOPEDICS | Facility: CLINIC | Age: 57
End: 2017-03-28

## 2017-03-28 ENCOUNTER — PATIENT MESSAGE (OUTPATIENT)
Dept: RHEUMATOLOGY | Facility: CLINIC | Age: 57
End: 2017-03-28

## 2017-03-29 DIAGNOSIS — G56.20 CUBITAL TUNNEL SYNDROME, UNSPECIFIED LATERALITY: Primary | ICD-10-CM

## 2017-04-04 ENCOUNTER — CLINICAL SUPPORT (OUTPATIENT)
Dept: REHABILITATION | Facility: HOSPITAL | Age: 57
End: 2017-04-04
Attending: ORTHOPAEDIC SURGERY
Payer: COMMERCIAL

## 2017-04-04 DIAGNOSIS — G56.01 CARPAL TUNNEL SYNDROME OF RIGHT WRIST: Primary | ICD-10-CM

## 2017-04-04 PROCEDURE — 97022 WHIRLPOOL THERAPY: CPT

## 2017-04-04 PROCEDURE — 97035 APP MDLTY 1+ULTRASOUND EA 15: CPT

## 2017-04-04 PROCEDURE — 97110 THERAPEUTIC EXERCISES: CPT

## 2017-04-04 NOTE — PROGRESS NOTES
"OCCUPATIONAL THERAPY        DATE : 04/04/2017    Name: Joyce Peterson  Referring Physician: Staci Yarbrough, *   Allergies:   Review of patient's allergies indicates:   Allergen Reactions    Actemra [tocilizumab] Other (See Comments)     Severe dizziness    Codeine Nausea And Vomiting    Gold au 198 Hives and Rash    Hydroxychloroquine Other (See Comments)     Can't remember the reaction      Iodinated contrast media - iv dye Other (See Comments)     Other reaction(s): BURNING ALL OVER    Iodine Other (See Comments)     Other reaction(s): BURNING ALL OVER - Iodine dye - Not topical    Sulfa (sulfonamide antibiotics) Other (See Comments)     Can't remember the reaction    Zofran [ondansetron hcl (pf)] Nausea And Vomiting     Pt reports that last time she received zofran she started vomiting again    Methotrexate analogues Nausea Only    Pneumovax 23 [pneumococcal 23-argenis ps vaccine] Other (See Comments)     "sick"     Medical history:   Past Medical History:   Diagnosis Date    Acid reflux     Allergy     Anemia     Asthma     Coronary artery disease     Degenerative disc disease     Dry eyes     Dry mouth     Gastroparesis     Hyperlipidemia     Lateral meniscus derangement 4/6/2016    Osteoarthritis     Osteoporosis     Rheumatoid arthritis     Umbilical hernia 8/13/2015     Medication:    Current Outpatient Prescriptions on File Prior to Visit   Medication Sig Dispense Refill    albuterol 90 mcg/actuation inhaler Inhale 2 puffs into the lungs every 6 (six) hours as needed for Wheezing. 18 g 11    aspirin 81 mg Tab Take 81 mg by mouth every morning.       azelastine (ASTELIN) 137 mcg nasal spray 1 spray (137 mcg total) by Nasal route 2 (two) times daily. (Patient taking differently: 1 spray by Nasal route 2 (two) times daily as needed. ) 30 mL 11    azithromycin (ZITHROMAX Z-DEVON) 250 MG tablet 2 tablets for the first day then one tablet daily until ZPAK complete. 1 tablet 0    " azithromycin (ZITHROMAX Z-DEVON) 250 MG tablet 2 tablets for the first day then one tablet daily until ZPAK complete. 1 tablet 0    budesonide-formoterol 160-4.5 mcg (SYMBICORT) 160-4.5 mcg/actuation HFAA Inhale 2 puffs into the lungs every 4 to 6 hours as needed. 1 Inhaler 12    calcium citrate-vitamin D (CITRACAL + D) 315-200 mg-unit per tablet Take 1 tablet by mouth 2 (two) times daily.       CRESTOR 20 mg tablet Take 1 tablet (20 mg total) by mouth once daily. 90 tablet 3    CYCLOSPORINE (RESTASIS OPHT) Inject 0.05 % into the eye. 1 Dropperette Both eyes Twice a day .  dispense one month supply/sixty-four vials/ two trays      fish oil-omega-3 fatty acids 300-1,000 mg capsule Take 1,400 g by mouth once daily.      flaxseed oil 1,000 mg Cap Take by mouth once daily.      GUAIFENESIN (MUCINEX ORAL) Take 400 mg by mouth every 4 (four) hours as needed.       INV PROPULSID 10 MG Take 20 mg by mouth 4 (four) times daily as directed by physician.  For investigational use only 480 each 2    leflunomide (ARAVA) 20 MG Tab Take 1 tablet (20 mg total) by mouth once daily. 30 tablet 2    montelukast (SINGULAIR) 10 mg tablet TAKE 1 TABLET AT BEDTIME 30 tablet 1    naproxen (NAPROSYN) 500 MG tablet TAKE 1 TABLET TWICE A DAY 60 tablet 1    omeprazole (PRILOSEC) 40 MG capsule TAKE ONE CAPSULE BY MOUTH EVERY MORNING 30 capsule 6    omeprazole-sodium bicarbonate (ZEGERID) 40-1.1 mg-gram per capsule Take 1 capsule by mouth every morning. 30 capsule 11    POTASSIUM ORAL Take by mouth once daily.      predniSONE (DELTASONE) 5 MG tablet TAKE 1 & 1/2 TABS EVERY DAY 45 tablet 1    progesterone (PROMETRIUM) 100 MG capsule Take 100 mg by mouth every morning. 1 Capsule Oral Every day, morning      promethazine (PHENERGAN) 12.5 MG Tab Take 1 tablet (12.5 mg total) by mouth 4 (four) times daily. 30 tablet 0    rizatriptan (MAXALT) 10 MG tablet Take 10 mg by mouth as needed for Migraine.       sucralfate (CARAFATE) 1 gram  tablet Take 1 tablet (1 g total) by mouth 4 (four) times daily. 120 tablet 6    tofacitinib (XELJANZ XR) 11 mg Tb24 Take 1 tablet by mouth once daily. 30 tablet 1    VESICARE 10 mg tablet Take 1 tablet by mouth every morning.       vitamin E 1000 UNIT capsule Take 1,000 Units by mouth once daily.       No current facility-administered medications on file prior to visit.      Diagnosis:  Encounter Diagnosis   Name Primary?    Carpal tunnel syndrome of right wrist Yes     Occupational Profile level: low     Orders: Occupational Therapy evaluate and treat    Precautions stated on order: eval and treat      Evaluation Date: 3/2/7/17   Visit #:2     Plan of care Expiration: 3/27/17     SUBJECTIVE   Patient states:  Pt reports that she  Is getting an elbow brace for ulnar nerve through her insurance . She brought in the wrist brace to show therapist . therapist   Modified brace with mold skin around thumb to decrease rubbing.     She told her Dr Hollis recommended surgery to address ayala mckeon , pt is not interested in surgery so patient requested therapy.     Pts goals for therapy:  To not wake up with her right hand totally numb   Pain Scale: Joyce rates pain on a scale of 0-10 to be 6 at worst; 6 currently; 3 at best .  Onset: gradual  Date of Surgery: none   Chief complaint:  Numbness in entire  right hand   Radicular symptoms:   Morning time worse for sensation   Aggravating factors:   None   Easing factors:  None   Prior Therapy: none   History of Present Illness: years of carapl tunnel , last 6 months it has gotten worse,  Prior functional status: normal  Current functional status:  Limited with opening jars and bottles ,  Has discussed with MD on prior visits that pt would need CMC arthroplasty to right hand however pt is not interested.       Psychosocial skills: normal   Occupation:  Retired                        Job description includes:  Household task, pain with vacumming and sweeping   Patients   structured exercise routine: none   Exercise routine prior to onset: none   ADLS: Pt has a decrease ability to perform ADL's such as pain with gripping     Imagin/2016 EMG   CONCLUSION: COMPARISON TO STUDY PERFORMED ON 2013.   MODERATE CARPAL TUNNEL SYNDROME.   THERE HAS BEEN MILD PROGRESSION FROM THE PREVIOUS EXAM.          Xray: normal         Hand Dominance: right                                   OBJECTIVE     Cognitive status : : no deficits     Posture Alignment :normal   Joint integrity: normal   Skin integrity:normal   Edema: none   CMC right hand deformed limited thumb abduction     Sensation:   Light Touch: Intact   diminshed  Protective sensation to median and ulnar nerve distribution        Proprioception:   Intact        Upper Extremity Range of Motion   Joint Evaluation AROM PROM     Right  Left  / Right    Shoulder flex 0-180     Shoulder Abd 0-180     Shoulder ER 0-90     Shoulder IR 0-90     Shoulder Extension 0-80     Shoulder Horizontal adduction 0-90     Elbow flex/ext 0-150 WNL WNL   Wrist flex 0-80 0/20/ WNL   Wrist ext 0-70 0/25 arthritis  WNL   Supination 0-80 60 WNL   Pronation 0-80 70 WNL   UD 10 WNl   RD 10 WNL        right hand WFL range of digits .    Thumb limited motion   MP flexion 25   radial abduction 0/15/   Palmar abduction 0/10      Upper Extremity Strength  (R) UE  (L) UE                                        Supination 4/5    Supination  4+/5      Pronation 4/5    Pronation 4/5      Wrist flexion: 4/5 Wrist flexion: 4/5   Wrist extension: 4/5 Wrist extension: 4/5    Strength - second setting 60#,60#,60# : 60#,60#,50#                                       Treatment    Treatment time: 55  OT Evaluation Completed? Yes  Discussed Plan of Care with patient: Yes    Joyce received 50 minutes of therapeutic exercises.  Joyce received 10 minutes of ultrasound     Joyce received 1 eval    Written Home Exercises Provided:   Joyce oropeza good understanding of the  education provided. Patient demo good return demo of skill of exercises.     Treatment Received :        1. Pt performed and was provided with a written copy of exercises to perform as tolerated, including:   Ultrasound to carpal  tunnel 1.0 w/cm2 , 5 minutes and to medial forearm 5 minutes, median and ulnar nerve glides, IASTM to wrist flexors and extensors, X 10minutes ,    instructed to wear wrist brace at night.  to check fit and adjust in appropriate    Pt is getting elbow brace [facility] ,    ulnar brace at night time to keep elbow straight,    And heelbow to wear during the day for protection .       2. Pt was provided educational information, including range of motion, strengthening   3. Pt educated to use ice for 10- 20 minutes to decrease swelling  and/ or pain as needed.       FOTO - Outcomes  And G Codes     FOTO survey     FOTO:   Self Care DATE SCORE GCODE MODIFIER   INITIAL 3/27/17      CK 53  /100  CK    5TH /100 10TH         /100     Discharge           FOTO Goal : CJ modifier 8994    Interpretation of FOTO Modifiers    TEST SCORE  Modifier  Impairment Limitation Restriction    0%  CH  0 % impaired, limited or restricted    1-19%  CI  @ least 1% but less than 20% impaired, limited or restricted    20-39%  CJ  @ least 20%<40% impaired, limited or restricted    40-59%  CK  @ least 40%<60% impaired, limited or restricted    60-79%  CL  @ least 60% <80% impaired, limited or restricted    80-99%  CM  @ least 80%<100% impaired limited or restricted    100%  CN  100% impaired, limited or restricted       ASSESSMENT    Pt present to occupational therapy with an OT diagnosis of    Right carpal and cubital tunnel syndrome,       CLINICAL DECISION MAKING:    Analysis of data from eval:      Problem focused assessment, 1-3 performance deficits noted numbness in hand , dropping things ,       History Examination Decision Making Complexity Score   Occupational Profile: 56 year old ,  lives at home , pt has family that can assist pt was most task                             Medical and Therapy History:   Expanded                  Performance Deficits     Physical:  Decreased ability to perform task such as feeding/ cutting , use of fasteners , dressing         Cognitive:   WNL        Psychosocial:    WNl        Modification of task during  evaluation     *Modification/task , no assistance required    *   Level of complexity is low,     based on PMHX, co morbidities , data from assessments and functional level of assistance required with task and clinical presentation directly impacting  His/ her plan of care                Pt presents with the impairments as stated below in the impairments/problems list. Pt prognosis is Good..     Joyce will benefit from skilled outpatient occupational therapy to address the above stated deficits, provide pt/family education and to maximize pt's level of independence in the home and community environment.     Discussed Plan of Care with patient: Yes    Medical necessity is demonstrated by the following IMPAIRMENTS/PROBLEMS:    2. pain in  joint / limb  Right / left   3. Decreased flexibility  4. Decreased ROM  5. Decreased muscle strength  6. Inability to work       Pt's spiritual, cultural and educational needs considered and pt agreeable to plan of care and goals as stated below:     Anticipated Barriers for Occupational  therapy: anticipated none    GOALS:  6  weeks. Pt agrees with goals set.  1. Independent with HEP.  2. Report decreased to pain  with adls such as reaching overhead and into the cabinets.  3. Decreased pain to   0/10    With gripping toothbrush or water bottle   4. Pt reports 2out 7 days she has no numbness in hands when she awakes   5.     PLAN   Outpatient occupational therapy 1-  2 times weekly to include:   pt ed, hep, therapeutic exercises, neuromuscular re-education, joint mobilizations,  Iontophoresis , ultrasound and other modalities  prn  Treatment Certification Period: 3/27/17     To 5/30/17               .   Cont OT for  6  weeks.     I certify the need for these services furnished under this plan of treatment and while under my Care.    _________________________________,          _________________________  Physician        Date

## 2017-04-06 ENCOUNTER — CLINICAL SUPPORT (OUTPATIENT)
Dept: REHABILITATION | Facility: HOSPITAL | Age: 57
End: 2017-04-06
Attending: ORTHOPAEDIC SURGERY
Payer: COMMERCIAL

## 2017-04-06 DIAGNOSIS — G56.01 CARPAL TUNNEL SYNDROME OF RIGHT WRIST: Primary | ICD-10-CM

## 2017-04-06 PROCEDURE — 97110 THERAPEUTIC EXERCISES: CPT

## 2017-04-06 PROCEDURE — 97035 APP MDLTY 1+ULTRASOUND EA 15: CPT

## 2017-04-06 NOTE — PROGRESS NOTES
"OCCUPATIONAL THERAPY        DATE : 04/06/2017    Name: Joyce Peterson  Referring Physician: Staci Yarbrough, *   Allergies:   Review of patient's allergies indicates:   Allergen Reactions    Actemra [tocilizumab] Other (See Comments)     Severe dizziness    Codeine Nausea And Vomiting    Gold au 198 Hives and Rash    Hydroxychloroquine Other (See Comments)     Can't remember the reaction      Iodinated contrast media - iv dye Other (See Comments)     Other reaction(s): BURNING ALL OVER    Iodine Other (See Comments)     Other reaction(s): BURNING ALL OVER - Iodine dye - Not topical    Sulfa (sulfonamide antibiotics) Other (See Comments)     Can't remember the reaction    Zofran [ondansetron hcl (pf)] Nausea And Vomiting     Pt reports that last time she received zofran she started vomiting again    Methotrexate analogues Nausea Only    Pneumovax 23 [pneumococcal 23-argenis ps vaccine] Other (See Comments)     "sick"     Medical history:   Past Medical History:   Diagnosis Date    Acid reflux     Allergy     Anemia     Asthma     Coronary artery disease     Degenerative disc disease     Dry eyes     Dry mouth     Gastroparesis     Hyperlipidemia     Lateral meniscus derangement 4/6/2016    Osteoarthritis     Osteoporosis     Rheumatoid arthritis     Umbilical hernia 8/13/2015     Medication:    Current Outpatient Prescriptions on File Prior to Visit   Medication Sig Dispense Refill    albuterol 90 mcg/actuation inhaler Inhale 2 puffs into the lungs every 6 (six) hours as needed for Wheezing. 18 g 11    aspirin 81 mg Tab Take 81 mg by mouth every morning.       azelastine (ASTELIN) 137 mcg nasal spray 1 spray (137 mcg total) by Nasal route 2 (two) times daily. (Patient taking differently: 1 spray by Nasal route 2 (two) times daily as needed. ) 30 mL 11    azithromycin (ZITHROMAX Z-DEVON) 250 MG tablet 2 tablets for the first day then one tablet daily until ZPAK complete. 1 tablet 0    " azithromycin (ZITHROMAX Z-DEVON) 250 MG tablet 2 tablets for the first day then one tablet daily until ZPAK complete. 1 tablet 0    budesonide-formoterol 160-4.5 mcg (SYMBICORT) 160-4.5 mcg/actuation HFAA Inhale 2 puffs into the lungs every 4 to 6 hours as needed. 1 Inhaler 12    calcium citrate-vitamin D (CITRACAL + D) 315-200 mg-unit per tablet Take 1 tablet by mouth 2 (two) times daily.       CRESTOR 20 mg tablet Take 1 tablet (20 mg total) by mouth once daily. 90 tablet 3    CYCLOSPORINE (RESTASIS OPHT) Inject 0.05 % into the eye. 1 Dropperette Both eyes Twice a day .  dispense one month supply/sixty-four vials/ two trays      fish oil-omega-3 fatty acids 300-1,000 mg capsule Take 1,400 g by mouth once daily.      flaxseed oil 1,000 mg Cap Take by mouth once daily.      GUAIFENESIN (MUCINEX ORAL) Take 400 mg by mouth every 4 (four) hours as needed.       INV PROPULSID 10 MG Take 20 mg by mouth 4 (four) times daily as directed by physician.  For investigational use only 480 each 2    leflunomide (ARAVA) 20 MG Tab Take 1 tablet (20 mg total) by mouth once daily. 30 tablet 2    montelukast (SINGULAIR) 10 mg tablet TAKE 1 TABLET AT BEDTIME 30 tablet 1    naproxen (NAPROSYN) 500 MG tablet TAKE 1 TABLET TWICE A DAY 60 tablet 1    omeprazole (PRILOSEC) 40 MG capsule TAKE ONE CAPSULE BY MOUTH EVERY MORNING 30 capsule 6    omeprazole-sodium bicarbonate (ZEGERID) 40-1.1 mg-gram per capsule Take 1 capsule by mouth every morning. 30 capsule 11    POTASSIUM ORAL Take by mouth once daily.      predniSONE (DELTASONE) 5 MG tablet TAKE 1 & 1/2 TABS EVERY DAY 45 tablet 1    progesterone (PROMETRIUM) 100 MG capsule Take 100 mg by mouth every morning. 1 Capsule Oral Every day, morning      promethazine (PHENERGAN) 12.5 MG Tab Take 1 tablet (12.5 mg total) by mouth 4 (four) times daily. 30 tablet 0    rizatriptan (MAXALT) 10 MG tablet Take 10 mg by mouth as needed for Migraine.       sucralfate (CARAFATE) 1 gram  tablet Take 1 tablet (1 g total) by mouth 4 (four) times daily. 120 tablet 6    tofacitinib (XELJANZ XR) 11 mg Tb24 Take 1 tablet by mouth once daily. 30 tablet 1    VESICARE 10 mg tablet Take 1 tablet by mouth every morning.       vitamin E 1000 UNIT capsule Take 1,000 Units by mouth once daily.       No current facility-administered medications on file prior to visit.      Diagnosis:  Encounter Diagnosis   Name Primary?    Carpal tunnel syndrome of right wrist Yes     Occupational Profile level: low     Orders: Occupational Therapy evaluate and treat    Precautions stated on order: eval and treat      Evaluation Date: 3/2/7/17     Visit #:4     Plan of care Expiration: 3/27/17     SUBJECTIVE   Patient states:  Pt reports that she Got elbow brace .  She told her Dr Hollis recommended surgery to address nerve issue , pt is not interested in surgery so patient requested therapy.     Pts goals for therapy:  To not wake up with her right hand totally numb   Pain Scale: Joyce rates pain on a scale of 0-10 to be 6 at worst; 6 currently; 3 at best .  Onset: gradual  Date of Surgery: none   Chief complaint:  Numbness in entire  right hand   Radicular symptoms:   Morning time worse for sensation   Aggravating factors:   None   Easing factors:  None   Prior Therapy: none   History of Present Illness: years of carapl tunnel , last 6 months it has gotten worse,  Prior functional status: normal  Current functional status:  Limited with opening jars and bottles ,  Has discussed with MD on prior visits that pt would need CMC arthroplasty to right hand however pt is not interested.       Psychosocial skills: normal   Occupation:  Retired                        Job description includes:  Household task, pain with vacumming and sweeping   Patients  structured exercise routine: none   Exercise routine prior to onset: none   ADLS: Pt has a decrease ability to perform ADL's such as pain with gripping     Imagin/2016 EMG    CONCLUSION: COMPARISON TO STUDY PERFORMED ON 7/29/2013.   MODERATE CARPAL TUNNEL SYNDROME.   THERE HAS BEEN MILD PROGRESSION FROM THE PREVIOUS EXAM.          Xray: normal         Hand Dominance: right                                   OBJECTIVE     Cognitive status : : no deficits     Posture Alignment :normal   Joint integrity: normal   Skin integrity:normal   Edema: none   CMC right hand deformed limited thumb abduction     Sensation:   Light Touch: Intact   diminshed  Protective sensation to median and ulnar nerve distribution        Proprioception:   Intact        Upper Extremity Range of Motion   Joint Evaluation AROM PROM     Right  Left  / Right    Shoulder flex 0-180     Shoulder Abd 0-180     Shoulder ER 0-90     Shoulder IR 0-90     Shoulder Extension 0-80     Shoulder Horizontal adduction 0-90     Elbow flex/ext 0-150 WNL WNL   Wrist flex 0-80 0/20/ WNL   Wrist ext 0-70 0/25 arthritis  WNL   Supination 0-80 60 WNL   Pronation 0-80 70 WNL   UD 10 WNl   RD 10 WNL        right hand WFL range of digits .    Thumb limited motion   MP flexion 25   radial abduction 0/15/   Palmar abduction 0/10      Upper Extremity Strength  (R) UE  (L) UE                                        Supination 4/5    Supination  4+/5      Pronation 4/5    Pronation 4/5      Wrist flexion: 4/5 Wrist flexion: 4/5   Wrist extension: 4/5 Wrist extension: 4/5    Strength - second setting 60#,60#,60# : 60#,60#,50#                                       Treatment    Treatment time: 55  OT Evaluation Completed? Yes  Discussed Plan of Care with patient: Yes    Joyce received 50 minutes of therapeutic exercises.  Joyce received 10 minutes of ultrasound     Joyce received 1 eval    Written Home Exercises Provided:   Joyce demo good understanding of the education provided. Patient demo good return demo of skill of exercises.     Treatment Received :        1. Pt performed and was provided with a written copy of exercises to perform  as tolerated, including:   Ultrasound to carpal  tunnel 1.0 w/cm2 , 5 minutes and to medial forearm 5 minutes, median and ulnar nerve glides,   IASTM to wrist flexors and extensors, X 10 minutes ,     instructed to wear wrist brace at night.  to check fit and adjust in appropriate    Pt is getting elbow brace through [facility] ,    ulnar brace at night time to keep elbow straight,    And heelbow to wear during the day for protection .       2. Pt was provided educational information, including range of motion, strengthening   3. Pt educated to use ice for 10- 20 minutes to decrease swelling  and/ or pain as needed.       FOTO - Outcomes  And G Codes     FOTO survey     FOTO:   Self Care DATE SCORE GCODE MODIFIER   INITIAL 3/27/17      CK 53 /100  CK    5TH /100 10TH /100     Discharge           FOTO Goal : CJ modifier 8994    Interpretation of FOTO Modifiers    TEST SCORE  Modifier  Impairment Limitation Restriction    0%  CH  0 % impaired, limited or restricted    1-19%  CI  @ least 1% but less than 20% impaired, limited or restricted    20-39%  CJ  @ least 20%<40% impaired, limited or restricted    40-59%  CK  @ least 40%<60% impaired, limited or restricted    60-79%  CL  @ least 60% <80% impaired, limited or restricted    80-99%  CM  @ least 80%<100% impaired limited or restricted    100%  CN  100% impaired, limited or restricted       ASSESSMENT    Pt present to occupational therapy with an OT diagnosis of    Right carpal and cubital tunnel syndrome,       CLINICAL DECISION MAKING:    Analysis of data from eval:      Problem focused assessment, 1-3 performance deficits noted numbness in hand , dropping things ,       History Examination Decision Making Complexity Score   Occupational Profile: 56 year old , lives at home , pt has family that can assist pt was most task                             Medical and Therapy History:   Expanded                  Performance Deficits      Physical:  Decreased ability to perform task such as feeding/ cutting , use of fasteners , dressing         Cognitive:   WNL        Psychosocial:    WNl        Modification of task during  evaluation     *Modification/task , no assistance required    *   Level of complexity is low,     based on PMHX, co morbidities , data from assessments and functional level of assistance required with task and clinical presentation directly impacting  His/ her plan of care                Pt presents with the impairments as stated below in the impairments/problems list. Pt prognosis is Good..     Joyce will benefit from skilled outpatient occupational therapy to address the above stated deficits, provide pt/family education and to maximize pt's level of independence in the home and community environment.     Discussed Plan of Care with patient: Yes    Medical necessity is demonstrated by the following IMPAIRMENTS/PROBLEMS:    2. pain in  joint / limb  Right / left   3. Decreased flexibility  4. Decreased ROM  5. Decreased muscle strength  6. Inability to work       Pt's spiritual, cultural and educational needs considered and pt agreeable to plan of care and goals as stated below:     Anticipated Barriers for Occupational  therapy: anticipated none    GOALS:  6  weeks. Pt agrees with goals set.  1. Independent with HEP.  2. Report decreased to pain  with adls such as reaching overhead and into the cabinets.  3. Decreased pain to   0/10    With gripping toothbrush or water bottle   4. Pt reports 2out 7 days she has no numbness in hands when she awakes   5.     PLAN   Outpatient occupational therapy 1-  2 times weekly to include:   pt ed, hep, therapeutic exercises, neuromuscular re-education, joint mobilizations,  Iontophoresis , ultrasound and other modalities prn  Treatment Certification Period: 3/27/17     To 5/30/17               .   Cont OT for  6  weeks.     I certify the need for these services furnished under this plan  of treatment and while under my Care.    _________________________________,          _________________________  Physician        Date

## 2017-04-25 DIAGNOSIS — J30.89 PERENNIAL ALLERGIC RHINITIS: ICD-10-CM

## 2017-04-25 RX ORDER — MONTELUKAST SODIUM 10 MG/1
TABLET ORAL
Qty: 30 TABLET | Refills: 1 | Status: SHIPPED | OUTPATIENT
Start: 2017-04-25 | End: 2017-06-30 | Stop reason: SDUPTHER

## 2017-04-26 DIAGNOSIS — M06.9 RHEUMATOID ARTHRITIS, INVOLVING UNSPECIFIED SITE, UNSPECIFIED RHEUMATOID FACTOR PRESENCE: ICD-10-CM

## 2017-04-26 RX ORDER — PREDNISONE 5 MG/1
TABLET ORAL
Qty: 45 TABLET | Refills: 1 | Status: SHIPPED | OUTPATIENT
Start: 2017-04-26 | End: 2017-05-10 | Stop reason: SDUPTHER

## 2017-05-05 ENCOUNTER — LAB VISIT (OUTPATIENT)
Dept: LAB | Facility: HOSPITAL | Age: 57
End: 2017-05-05
Attending: INTERNAL MEDICINE
Payer: COMMERCIAL

## 2017-05-05 DIAGNOSIS — Z79.631 METHOTREXATE, LONG TERM, CURRENT USE: ICD-10-CM

## 2017-05-05 DIAGNOSIS — M06.9 RHEUMATOID ARTHRITIS OF HAND, UNSPECIFIED LATERALITY, UNSPECIFIED RHEUMATOID FACTOR PRESENCE: ICD-10-CM

## 2017-05-05 LAB
ALBUMIN SERPL BCP-MCNC: 3.8 G/DL
ALP SERPL-CCNC: 50 U/L
ALT SERPL W/O P-5'-P-CCNC: 20 U/L
ANION GAP SERPL CALC-SCNC: 8 MMOL/L
AST SERPL-CCNC: 20 U/L
BASOPHILS # BLD AUTO: 0.04 K/UL
BASOPHILS NFR BLD: 0.5 %
BILIRUB SERPL-MCNC: 0.3 MG/DL
BUN SERPL-MCNC: 14 MG/DL
CALCIUM SERPL-MCNC: 9.1 MG/DL
CHLORIDE SERPL-SCNC: 108 MMOL/L
CO2 SERPL-SCNC: 26 MMOL/L
CREAT SERPL-MCNC: 1 MG/DL
CRP SERPL-MCNC: 0.3 MG/L
DIFFERENTIAL METHOD: ABNORMAL
EOSINOPHIL # BLD AUTO: 0.1 K/UL
EOSINOPHIL NFR BLD: 0.6 %
ERYTHROCYTE [DISTWIDTH] IN BLOOD BY AUTOMATED COUNT: 19.1 %
ERYTHROCYTE [SEDIMENTATION RATE] IN BLOOD BY WESTERGREN METHOD: 6 MM/HR
EST. GFR  (AFRICAN AMERICAN): >60 ML/MIN/1.73 M^2
EST. GFR  (NON AFRICAN AMERICAN): >60 ML/MIN/1.73 M^2
GLUCOSE SERPL-MCNC: 83 MG/DL
HCT VFR BLD AUTO: 31.8 %
HGB BLD-MCNC: 9.6 G/DL
LYMPHOCYTES # BLD AUTO: 2.2 K/UL
LYMPHOCYTES NFR BLD: 28.6 %
MCH RBC QN AUTO: 22.8 PG
MCHC RBC AUTO-ENTMCNC: 30.2 %
MCV RBC AUTO: 76 FL
MONOCYTES # BLD AUTO: 0.8 K/UL
MONOCYTES NFR BLD: 10.8 %
NEUTROPHILS # BLD AUTO: 4.6 K/UL
NEUTROPHILS NFR BLD: 59.2 %
PLATELET # BLD AUTO: 373 K/UL
PMV BLD AUTO: 9.8 FL
POTASSIUM SERPL-SCNC: 4.2 MMOL/L
PROT SERPL-MCNC: 6.7 G/DL
RBC # BLD AUTO: 4.21 M/UL
SODIUM SERPL-SCNC: 142 MMOL/L
WBC # BLD AUTO: 7.79 K/UL

## 2017-05-05 PROCEDURE — 85025 COMPLETE CBC W/AUTO DIFF WBC: CPT

## 2017-05-05 PROCEDURE — 36415 COLL VENOUS BLD VENIPUNCTURE: CPT | Mod: PO

## 2017-05-05 PROCEDURE — 80053 COMPREHEN METABOLIC PANEL: CPT

## 2017-05-05 PROCEDURE — 85651 RBC SED RATE NONAUTOMATED: CPT

## 2017-05-05 PROCEDURE — 86140 C-REACTIVE PROTEIN: CPT

## 2017-05-08 ENCOUNTER — TELEPHONE (OUTPATIENT)
Dept: PHARMACY | Facility: CLINIC | Age: 57
End: 2017-05-08

## 2017-05-08 ENCOUNTER — TELEPHONE (OUTPATIENT)
Dept: RHEUMATOLOGY | Facility: CLINIC | Age: 57
End: 2017-05-08

## 2017-05-08 DIAGNOSIS — M85.80 OSTEOPENIA, UNSPECIFIED LOCATION: Primary | ICD-10-CM

## 2017-05-08 DIAGNOSIS — M06.9 RHEUMATOID ARTHRITIS, INVOLVING UNSPECIFIED SITE, UNSPECIFIED RHEUMATOID FACTOR PRESENCE: Primary | ICD-10-CM

## 2017-05-08 RX ORDER — TOFACITINIB 11 MG/1
TABLET, FILM COATED, EXTENDED RELEASE ORAL
Qty: 30 TABLET | Refills: 1 | Status: CANCELLED | OUTPATIENT
Start: 2017-05-08

## 2017-05-08 NOTE — TELEPHONE ENCOUNTER
"Reordered. Please have Delilah or Valarie find out what happened to the same specimen from 3/15 which is "in process" Thanks. MEGA  "

## 2017-05-09 ENCOUNTER — OFFICE VISIT (OUTPATIENT)
Dept: ORTHOPEDICS | Facility: CLINIC | Age: 57
End: 2017-05-09
Payer: COMMERCIAL

## 2017-05-09 VITALS
SYSTOLIC BLOOD PRESSURE: 105 MMHG | HEIGHT: 64 IN | WEIGHT: 154 LBS | RESPIRATION RATE: 18 BRPM | DIASTOLIC BLOOD PRESSURE: 70 MMHG | BODY MASS INDEX: 26.29 KG/M2 | HEART RATE: 102 BPM

## 2017-05-09 DIAGNOSIS — G56.01 RIGHT CARPAL TUNNEL SYNDROME: Primary | ICD-10-CM

## 2017-05-09 DIAGNOSIS — G56.21 CUBITAL TUNNEL SYNDROME, RIGHT: ICD-10-CM

## 2017-05-09 DIAGNOSIS — M77.11 RIGHT LATERAL EPICONDYLITIS: ICD-10-CM

## 2017-05-09 PROCEDURE — 99999 PR PBB SHADOW E&M-EST. PATIENT-LVL IV: CPT | Mod: PBBFAC,,, | Performed by: ORTHOPAEDIC SURGERY

## 2017-05-09 PROCEDURE — 99214 OFFICE O/P EST MOD 30 MIN: CPT | Mod: S$GLB,,, | Performed by: ORTHOPAEDIC SURGERY

## 2017-05-09 PROCEDURE — 1160F RVW MEDS BY RX/DR IN RCRD: CPT | Mod: S$GLB,,, | Performed by: ORTHOPAEDIC SURGERY

## 2017-05-09 RX ORDER — MUPIROCIN 20 MG/G
1 OINTMENT TOPICAL
Status: CANCELLED | OUTPATIENT
Start: 2017-05-09

## 2017-05-09 NOTE — PROGRESS NOTES
I have personally taken the history and examined the patient. I agree with the Hand Surgery PA's note. The plan will be surgical release of CTS. Pt understands risks and possibility it will not relieve her pain.   LAteral epicondylitis- will treat with therapy

## 2017-05-09 NOTE — PROGRESS NOTES
This office note has been dictated.   Dictation Confirmation Code: [id number]  Arlen Lux PA-C  05/09/2017  11:00 AM  Supervising Physician:  Dr. Staci Yarbrough, MD Cook was seen today for pain.    Diagnoses and all orders for this visit:    Right carpal tunnel syndrome    Right lateral epicondylitis    Cubital tunnel syndrome, right    -Discussed surgical intervention and conservative treatment with patient in detail. All risks, benefits discussed.     - Patient would like to proceed with right CTR       Consents were signed.     Pre, kirstie, and post operative procedure and expectations were discussed.  Questions were answered. The patient has been educated and is ready to proceed with surgery.  Approximately 30 minutes was spent discussing surgical outcomes, plans, procedures, pre, kirstie, and post operative expectations and care.  The patient will contact us if the have any questions, concerns, and changes in their medical condition prior to surgery.

## 2017-05-10 ENCOUNTER — OFFICE VISIT (OUTPATIENT)
Dept: RHEUMATOLOGY | Facility: CLINIC | Age: 57
End: 2017-05-10
Payer: COMMERCIAL

## 2017-05-10 VITALS
HEIGHT: 64 IN | BODY MASS INDEX: 26 KG/M2 | HEART RATE: 97 BPM | WEIGHT: 152.31 LBS | DIASTOLIC BLOOD PRESSURE: 70 MMHG | SYSTOLIC BLOOD PRESSURE: 112 MMHG

## 2017-05-10 DIAGNOSIS — D84.9 IMMUNOSUPPRESSED STATUS: Primary | ICD-10-CM

## 2017-05-10 DIAGNOSIS — E04.1 THYROID NODULE: ICD-10-CM

## 2017-05-10 DIAGNOSIS — M85.80 OSTEOPENIA, UNSPECIFIED LOCATION: ICD-10-CM

## 2017-05-10 DIAGNOSIS — Z12.11 SPECIAL SCREENING FOR MALIGNANT NEOPLASMS, COLON: Primary | ICD-10-CM

## 2017-05-10 DIAGNOSIS — K21.9 GASTROESOPHAGEAL REFLUX DISEASE WITHOUT ESOPHAGITIS: ICD-10-CM

## 2017-05-10 DIAGNOSIS — I25.10 CORONARY ARTERY DISEASE INVOLVING NATIVE CORONARY ARTERY OF NATIVE HEART WITHOUT ANGINA PECTORIS: Chronic | ICD-10-CM

## 2017-05-10 DIAGNOSIS — D80.1 HYPOGAMMAGLOBULINEMIA: ICD-10-CM

## 2017-05-10 DIAGNOSIS — E78.00 PURE HYPERCHOLESTEROLEMIA: ICD-10-CM

## 2017-05-10 DIAGNOSIS — D50.9 IRON DEFICIENCY ANEMIA, UNSPECIFIED IRON DEFICIENCY ANEMIA TYPE: ICD-10-CM

## 2017-05-10 DIAGNOSIS — M06.9 RHEUMATOID ARTHRITIS, INVOLVING UNSPECIFIED SITE, UNSPECIFIED RHEUMATOID FACTOR PRESENCE: ICD-10-CM

## 2017-05-10 DIAGNOSIS — R79.89 LOW TSH LEVEL: ICD-10-CM

## 2017-05-10 DIAGNOSIS — M06.9 RHEUMATOID ARTHRITIS INVOLVING MULTIPLE SITES, UNSPECIFIED RHEUMATOID FACTOR PRESENCE: ICD-10-CM

## 2017-05-10 DIAGNOSIS — J45.21 CHRONIC ASTHMA, MILD INTERMITTENT, WITH ACUTE EXACERBATION: ICD-10-CM

## 2017-05-10 DIAGNOSIS — M47.812 SPONDYLOSIS OF CERVICAL REGION WITHOUT MYELOPATHY OR RADICULOPATHY: ICD-10-CM

## 2017-05-10 DIAGNOSIS — K31.84 GASTROPARESIS: ICD-10-CM

## 2017-05-10 DIAGNOSIS — M47.816 SPONDYLOSIS OF LUMBAR REGION WITHOUT MYELOPATHY OR RADICULOPATHY: ICD-10-CM

## 2017-05-10 DIAGNOSIS — R79.89 ELEVATED PARATHYROID HORMONE: ICD-10-CM

## 2017-05-10 DIAGNOSIS — M15.9 OSTEOARTHRITIS OF MULTIPLE JOINTS, UNSPECIFIED OSTEOARTHRITIS TYPE: ICD-10-CM

## 2017-05-10 PROBLEM — M54.50 ACUTE BILATERAL LOW BACK PAIN WITHOUT SCIATICA: Status: RESOLVED | Noted: 2017-02-07 | Resolved: 2017-05-10

## 2017-05-10 PROBLEM — M54.6 THORACIC BACK PAIN: Status: RESOLVED | Noted: 2017-02-07 | Resolved: 2017-05-10

## 2017-05-10 PROCEDURE — 99214 OFFICE O/P EST MOD 30 MIN: CPT | Mod: S$GLB,,, | Performed by: INTERNAL MEDICINE

## 2017-05-10 PROCEDURE — 1160F RVW MEDS BY RX/DR IN RCRD: CPT | Mod: S$GLB,,, | Performed by: INTERNAL MEDICINE

## 2017-05-10 PROCEDURE — 99999 PR PBB SHADOW E&M-EST. PATIENT-LVL V: CPT | Mod: PBBFAC,,, | Performed by: INTERNAL MEDICINE

## 2017-05-10 RX ORDER — POLYETHYLENE GLYCOL 3350, SODIUM SULFATE ANHYDROUS, SODIUM BICARBONATE, SODIUM CHLORIDE, POTASSIUM CHLORIDE 236; 22.74; 6.74; 5.86; 2.97 G/4L; G/4L; G/4L; G/4L; G/4L
4 POWDER, FOR SOLUTION ORAL ONCE
Qty: 4000 ML | Refills: 0 | Status: SHIPPED | OUTPATIENT
Start: 2017-05-10 | End: 2017-05-10

## 2017-05-10 RX ORDER — NAPROXEN 500 MG/1
500 TABLET ORAL 2 TIMES DAILY
Qty: 60 TABLET | Refills: 1 | Status: SHIPPED | OUTPATIENT
Start: 2017-05-10 | End: 2017-07-27 | Stop reason: SDUPTHER

## 2017-05-10 RX ORDER — PREDNISONE 5 MG/1
5 TABLET ORAL DAILY
Qty: 30 TABLET | Refills: 3 | Status: SHIPPED | OUTPATIENT
Start: 2017-05-10 | End: 2017-08-21 | Stop reason: SDUPTHER

## 2017-05-10 RX ORDER — LEFLUNOMIDE 20 MG/1
20 TABLET ORAL DAILY
Qty: 30 TABLET | Refills: 2 | Status: SHIPPED | OUTPATIENT
Start: 2017-05-10 | End: 2017-08-21 | Stop reason: SDUPTHER

## 2017-05-10 ASSESSMENT — DISEASE ACTIVITY SCORE (DAS28)
GLOBAL_HEALTH_SCORE: 45
ESR_MM_PER_HR: 6
TOTAL_SCORE_ESR: 4.95
GLOBAL_HEALTH_SCORE: 45
SWOLLEN_JOINTS_COUNT: 12
TOTAL_SCORE_CRP: 4.75
ESR_MM_PER_HR: 6
TENDER_JOINTS_COUNT: 14
CRP_MG_PER_LITER: .3
CRP_MG_PER_LITER: .3

## 2017-05-10 ASSESSMENT — ROUTINE ASSESSMENT OF PATIENT INDEX DATA (RAPID3)
FATIGUE SCORE: 5
PSYCHOLOGICAL DISTRESS SCORE: 2.2
WHEN YOU AWAKENED IN THE MORNING OVER THE LAST WEEK, PLEASE INDICATE THE AMOUNT OF TIME IT TAKES UNTIL YOU ARE AS LIMBER AS YOU WILL BE FOR THE DAY: 3 HRS
MDHAQ FUNCTION SCORE: 1.7
PAIN SCORE: 4
PATIENT GLOBAL ASSESSMENT SCORE: 4.5
AM STIFFNESS SCORE: 1, YES
TOTAL RAPID3 SCORE: 4.72

## 2017-05-10 NOTE — PROGRESS NOTES
This is a preoperative H and P as well as an office visit.    CHIEF COMPLAINT:  Right hand and elbow pain.    HISTORY OF PRESENT ILLNESS:  Ms. Peterson is a 56-year-old right-hand dominant   female presenting today for evaluation of her right upper extremity pain.  She   reports that she went to therapy, but is still having pain at the elbow.  She is   interested in an injection for her tennis elbow.  She does have RA.  She is   concerned that this is not getting better.  She still has numbness and tingling   of her fingers, mostly in the morning and in the evening time.  She reports   during the day, it is usually okay, but occasionally, it does go numb,   especially positional.  She feels that this is also painful.  She does have pain   that radiates up the volar aspect of her forearm.  She reports most of the   numbness is in the middle and ring finger, but occasionally does have small and   thumb numbness.  She denies any recent trauma.  She is on prednisone.  She   denies nausea, vomiting, fever or chills today.    Past Medical History:   Diagnosis Date    Acid reflux     Allergy     Anemia     Asthma     Coronary artery disease     Degenerative disc disease     Dry eyes     Dry mouth     Gastroparesis     Hyperlipidemia     Lateral meniscus derangement 4/6/2016    Osteoarthritis     Osteoporosis     Rheumatoid arthritis     Umbilical hernia 8/13/2015       Past Surgical History:   Procedure Laterality Date    CHOLECYSTECTOMY  2004    COLONOSCOPY      10/11    TONSILLECTOMY      TUBAL LIGATION  2003    UPPER GASTROINTESTINAL ENDOSCOPY      10/11    uterine ablation  2003       Social History     Social History    Marital status:      Spouse name: N/A    Number of children: N/A    Years of education: N/A     Occupational History    Not on file.     Social History Main Topics    Smoking status: Never Smoker    Smokeless tobacco: Never Used    Alcohol use Yes      Comment:  occasionally    Drug use: No    Sexual activity: Yes     Partners: Male     Birth control/ protection: Surgical     Other Topics Concern    Not on file     Social History Narrative       Current Outpatient Prescriptions on File Prior to Visit   Medication Sig Dispense Refill    albuterol 90 mcg/actuation inhaler Inhale 2 puffs into the lungs every 6 (six) hours as needed for Wheezing. 18 g 11    aspirin 81 mg Tab Take 81 mg by mouth every morning.       azelastine (ASTELIN) 137 mcg nasal spray 1 spray (137 mcg total) by Nasal route 2 (two) times daily. (Patient taking differently: 1 spray by Nasal route 2 (two) times daily as needed. ) 30 mL 11    azithromycin (ZITHROMAX Z-DEVON) 250 MG tablet 2 tablets for the first day then one tablet daily until ZPAK complete. 1 tablet 0    azithromycin (ZITHROMAX Z-DEVON) 250 MG tablet 2 tablets for the first day then one tablet daily until ZPAK complete. 1 tablet 0    budesonide-formoterol 160-4.5 mcg (SYMBICORT) 160-4.5 mcg/actuation HFAA Inhale 2 puffs into the lungs every 4 to 6 hours as needed. 1 Inhaler 12    calcium citrate-vitamin D (CITRACAL + D) 315-200 mg-unit per tablet Take 1 tablet by mouth 2 (two) times daily.       CRESTOR 20 mg tablet Take 1 tablet (20 mg total) by mouth once daily. 90 tablet 3    CYCLOSPORINE (RESTASIS OPHT) Inject 0.05 % into the eye. 1 Dropperette Both eyes Twice a day .  dispense one month supply/sixty-four vials/ two trays      fish oil-omega-3 fatty acids 300-1,000 mg capsule Take 1,400 g by mouth once daily.      flaxseed oil 1,000 mg Cap Take by mouth once daily.      GUAIFENESIN (MUCINEX ORAL) Take 400 mg by mouth every 4 (four) hours as needed.       INV PROPULSID 10 MG Take 20 mg by mouth 4 (four) times daily as directed by physician.  For investigational use only 480 each 2    leflunomide (ARAVA) 20 MG Tab Take 1 tablet (20 mg total) by mouth once daily. 30 tablet 2    montelukast (SINGULAIR) 10 mg tablet TAKE 1 TABLET  AT BEDTIME 30 tablet 1    naproxen (NAPROSYN) 500 MG tablet TAKE 1 TABLET TWICE A DAY 60 tablet 1    omeprazole (PRILOSEC) 40 MG capsule TAKE ONE CAPSULE BY MOUTH EVERY MORNING 30 capsule 6    omeprazole-sodium bicarbonate (ZEGERID) 40-1.1 mg-gram per capsule Take 1 capsule by mouth every morning. 30 capsule 11    POTASSIUM ORAL Take by mouth once daily.      predniSONE (DELTASONE) 5 MG tablet TAKE 1 & 1/2 TABS EVERY DAY 45 tablet 1    progesterone (PROMETRIUM) 100 MG capsule Take 100 mg by mouth every morning. 1 Capsule Oral Every day, morning      promethazine (PHENERGAN) 12.5 MG Tab Take 1 tablet (12.5 mg total) by mouth 4 (four) times daily. 30 tablet 0    rizatriptan (MAXALT) 10 MG tablet Take 10 mg by mouth as needed for Migraine.       sucralfate (CARAFATE) 1 gram tablet Take 1 tablet (1 g total) by mouth 4 (four) times daily. 120 tablet 6    tofacitinib (XELJANZ XR) 11 mg Tb24 Take 1 tablet by mouth once daily. 30 tablet 1    VESICARE 10 mg tablet Take 1 tablet by mouth every morning.       vitamin E 1000 UNIT capsule Take 1,000 Units by mouth once daily.       No current facility-administered medications on file prior to visit.        Review of patient's allergies indicates:   Allergen Reactions    Actemra [tocilizumab] Other (See Comments)     Severe dizziness    Codeine Nausea And Vomiting    Gold au 198 Hives and Rash    Hydroxychloroquine Other (See Comments)     Can't remember the reaction      Iodinated contrast media - oral and iv dye Other (See Comments)     Other reaction(s): BURNING ALL OVER    Iodine Other (See Comments)     Other reaction(s): BURNING ALL OVER - Iodine dye - Not topical    Sulfa (sulfonamide antibiotics) Other (See Comments)     Can't remember the reaction    Zofran [ondansetron hcl (pf)] Nausea And Vomiting     Pt reports that last time she received zofran she started vomiting again    Methotrexate analogues Nausea Only    Pneumovax 23 [pneumococcal 23-argenis  "ps vaccine] Other (See Comments)     "sick"       Review of Systems:  Constitutional: no fever or chills  ENT: no nasal congestion or sore throat  Respiratory: no cough or shortness of breath  Cardiovascular: no chest pain or palpitations  Gastrointestinal: no nausea or vomiting  Genitourinary: no hematuria or dysuria  Integument/Breast: no rash or pruritis  Hematologic/Lymphatic: no easy bruising or lymphadenopathy  Musculoskeletal: see HPI  Neurological: no seizures or tremors  Behavioral/Psych: no auditory or visual hallucinations      PHYSICAL EXAM    Vitals:    05/09/17 1012   BP: 105/70   Pulse: 102   Resp: 18   Weight: 69.9 kg (154 lb)   Height: 5' 3.6" (1.615 m)   PainSc: 0-No pain   PainLoc: Hand       GENERAL:  Well-developed, well-nourished, in no acute distress.  CARDIOVASCULAR:  Regular rate and rhythm.  LUNGS:  Respirations are equal and unlabored.  BEHAVIORAL AND PSYCHIATRIC:  Mood and affect are appropriate.  NEUROLOGIC:  No tremor.  HEENT:  Normocephalic and atraumatic.  MUSCULOSKELETAL:  Upper Extremity Exam:  She is distally neurovascularly intact.    AIN and PIN nerves are intact.  Sensation over the radial, ulnar and median   nerves are equivocal.  She does have a positive Tinel's and positive Durkan's.    She does have tenderness over the lateral epicondyle.  Negative Tinel's at the   elbow.  She does have a predominant ulnar styloid present.  Some tenderness over   the dorsal aspect of the wrist.  She does have a full composite fist.  She does   have a shoulder deformity at the thumb CMC.    ELECTROMYELOGRAM:  On EMG, moderate right carpal tunnel.    ASSESSMENT:  1.  Right carpal tunnel.  2.  Lateral epicondylitis.  3.  Cubital tunnel.    PLAN:  I discussed with Ms. Peterson at this time, we can proceed with surgical   intervention to fix her lateral epicondylitis at the same time as the carpal   tunnel and cubital tunnel; however, would require 6 weeks of casting.  She   reports that because " of her RA, she does not want any type of casting.  We also   discussed elective casting.  She does not want that.  She would like to proceed   with therapy for the elbow, but just wants her carpal tunnel fixed.  We will   proceed with carpal tunnel release and see how she does from that.  As long as   she is doing well, we do not need to proceed with surgical intervention.  We   will have [facility] for her lateral epicondylitis   postoperatively.  The patient wishes to proceed as planned.    DICTATED BY:  ANNETTE Colunga  dd: 05/09/2017 12:05:30 (CDT)  td: 05/10/2017 10:06:27 (CDT)  Doc ID   #[id number]  Job ID #[id number]    CC:

## 2017-05-10 NOTE — MR AVS SNAPSHOT
[facility]  [address]  [place] [address]  Phone: [phone]  Fax: [phone]                  Joyce Peterson   5/10/2017 2:30 PM   Office Visit    Description:  Female : 1960   Provider:  Isiah Moran MD   Department:  [facility]           Diagnoses this Visit        Comments    Rheumatoid arthritis, involving unspecified site, unspecified rheumatoid factor presence                To Do List           Future Appointments        Provider Department Dept Phone    2017 11:30 AM Lani Laguerre MD [facility] [phone]      Your Future Surgeries/Procedures     2017   Surgery with López Moore MD   [facility] ([facility])    [address] [facility] [address] [phone]              Goals (5 Years of Data)     None       These Medications        Disp Refills Start End    tofacitinib (XELJANZ XR) 11 mg Tb24 30 tablet 1 5/10/2017     Take 1 tablet by mouth once daily. - Oral    Pharmacy: [facility] - [address] Ph #: [phone]       leflunomide (ARAVA) 20 MG Tab 30 tablet 2 5/10/2017     Take 1 tablet (20 mg total) by mouth once daily. - Oral    Pharmacy: [facility] - [address] Ph #: [phone]       naproxen (NAPROSYN) 500 MG tablet 60 tablet 1 5/10/2017     Take 1 tablet (500 mg total) by mouth 2 (two) times daily. - Oral    Pharmacy: [facility]acy #[id number] - [place] - [address] [address] [facility] Ph #: [phone]       predniSONE (DELTASONE) 5 MG tablet 30 tablet 3 5/10/2017     Take 1 tablet (5 mg total) by mouth once daily. - Oral    Pharmacy: [facility]/pharmacy #[id number] - [place] - [address] [address] [facility] Ph #: [phone]         [facility]     [facility] Nurse Care Line -  Assistance  Unless otherwise directed by your provider, please contact [facility], our nurse care line that is available for 24/7 assistance.     Registered nurses in the [facility] provide: appointment scheduling, clinical advisement, health education, and other advisory services.  Call: [phone] (toll free)               Medications           Message regarding Medications     Verify the changes and/or additions to your medication regime listed below are the same as discussed with your clinician today.  If any of these changes or additions are incorrect, please notify your healthcare provider.        CHANGE how you are taking these medications     Start Taking Instead of    naproxen (NAPROSYN) 500 MG tablet naproxen (NAPROSYN) 500 MG tablet    Dosage:  Take 1 tablet (500 mg total) by mouth 2 (two) times daily. Dosage:  TAKE 1 TABLET TWICE A DAY    Reason for Change:  Reorder     predniSONE (DELTASONE) 5 MG tablet predniSONE (DELTASONE) 5 MG tablet    Dosage:  Take 1 tablet (5 mg total) by mouth once daily. Dosage:  TAKE 1 & 1/2 TABS EVERY DAY    Reason for Change:  Reorder            Verify that the below list of medications is an accurate representation of the medications you are currently taking.  If none reported, the list may be blank. If incorrect, please contact your healthcare provider. Carry this list with you in case of emergency.           Current Medications     albuterol 90 mcg/actuation inhaler Inhale 2 puffs into the lungs every 6 (six) hours as needed for Wheezing.    aspirin 81 mg Tab Take 81 mg by mouth every morning.     azelastine (ASTELIN) 137 mcg nasal spray 1 spray (137 mcg total) by Nasal route 2 (two) times daily.    azithromycin (ZITHROMAX Z-DEVON) 250 MG tablet 2 tablets for the first day then one tablet daily until ZPAK complete.    azithromycin (ZITHROMAX Z-DEVON) 250 MG tablet 2 tablets for the first day then one tablet daily until ZPAK complete.    budesonide-formoterol 160-4.5 mcg (SYMBICORT) 160-4.5 mcg/actuation HFAA Inhale 2 puffs into the  lungs every 4 to 6 hours as needed.    calcium citrate-vitamin D (CITRACAL + D) 315-200 mg-unit per tablet Take 1 tablet by mouth 2 (two) times daily.     CRESTOR 20 mg tablet Take 1 tablet (20 mg total) by mouth once daily.    CYCLOSPORINE (RESTASIS OPHT) Inject 0.05 % into the eye. 1 Dropperette Both eyes Twice a day .  dispense one month supply/sixty-four vials/ two trays    fish oil-omega-3 fatty acids 300-1,000 mg capsule Take 1,400 g by mouth once daily.    flaxseed oil 1,000 mg Cap Take by mouth once daily.    GUAIFENESIN (MUCINEX ORAL) Take 400 mg by mouth every 4 (four) hours as needed.     INV PROPULSID 10 MG Take 20 mg by mouth 4 (four) times daily as directed by physician.  For investigational use only    leflunomide (ARAVA) 20 MG Tab Take 1 tablet (20 mg total) by mouth once daily.    montelukast (SINGULAIR) 10 mg tablet TAKE 1 TABLET AT BEDTIME    naproxen (NAPROSYN) 500 MG tablet Take 1 tablet (500 mg total) by mouth 2 (two) times daily.    omeprazole (PRILOSEC) 40 MG capsule TAKE ONE CAPSULE BY MOUTH EVERY MORNING    omeprazole-sodium bicarbonate (ZEGERID) 40-1.1 mg-gram per capsule Take 1 capsule by mouth every morning.    polyethylene glycol (GOLYTELY,NULYTELY) 236-22.74-6.74 -5.86 gram suspension Take 4,000 mLs (4 L total) by mouth once.    POTASSIUM ORAL Take by mouth once daily.    progesterone (PROMETRIUM) 100 MG capsule Take 100 mg by mouth every morning. 1 Capsule Oral Every day, morning    promethazine (PHENERGAN) 12.5 MG Tab Take 1 tablet (12.5 mg total) by mouth 4 (four) times daily.    rizatriptan (MAXALT) 10 MG tablet Take 10 mg by mouth as needed for Migraine.     sucralfate (CARAFATE) 1 gram tablet Take 1 tablet (1 g total) by mouth 4 (four) times daily.    tofacitinib (XELJANZ XR) 11 mg Tb24 Take 1 tablet by mouth once daily.    VESICARE 10 mg tablet Take 1 tablet by mouth every morning.     vitamin E 1000 UNIT capsule Take 1,000 Units by mouth once daily.    predniSONE (DELTASONE) 5  "MG tablet Take 1 tablet (5 mg total) by mouth once daily.           Clinical Reference Information           Your Vitals Were     BP Pulse Height Weight BMI    112/70 97 5' 3.6" (1.615 m) 69.1 kg (152 lb 4.8 oz) 26.47 kg/m2      Blood Pressure          Most Recent Value    BP  112/70      Allergies as of 5/10/2017     Actemra [Tocilizumab]    Codeine    Gold Au 198    Hydroxychloroquine    Iodinated Contrast Media - Oral And Iv Dye    Iodine    Sulfa (Sulfonamide Antibiotics)    Zofran [Ondansetron Hcl (Pf)]    Methotrexate Analogues    Pneumovax 23 [Pneumococcal 23-argenis Ps Vaccine]      Immunizations Administered on Date of Encounter - 5/10/2017     None      Orders Placed During Today's Visit      Normal Orders This Visit    Ambulatory Referral to Endocrinology       Instructions    Continue to take naproxen with food and omeprazole  Continue to take 6mg of prednisone for 1 week then drop to 5mg until next apt  Call office if new sickness or fevers as we might need to temporarily stop some medications.  RTC 3 months       Language Assistance Services     ATTENTION: Language assistance services are available, free of charge. Please call [phone].      ATENCIÓN: Si habla mirta, tiene a hernández disposición servicios gratuitos de asistencia lingüística. Llame al [phone].     ELSA Ý: N?u b?n nói Ti?ng Vi?t, có các d?ch v? h? tr? ngôn ng? mi?n phí dành cho b?n. G?i s? [phone].         [facility] complies with applicable Federal civil rights laws and does not discriminate on the basis of race, color, national origin, age, disability, or sex.        "

## 2017-05-10 NOTE — PATIENT INSTRUCTIONS
Continue to take naproxen with food and omeprazole  Continue to take 6mg of prednisone for 1 week then drop to 5mg until next apt  Call office if new sickness or fevers as we might need to temporarily stop some medications.  RTC 3 months

## 2017-05-10 NOTE — PROGRESS NOTES
"PROGRESS NOTE     Subjective:       Patient ID: Joyce Peterson is a 56 y.o. female.     Chief Complaint: No chief complaint on file.     History of Present Illness:   Joyce Peterson is a 56 y.o. female who presents for follow up     Titrating down prednisone and now on 6mg daily taking down by 1mg every 4 days. Has been doing "good so far" with minimal joint pains and swelling in hands. Stating that she sometimes has to rush and tack her rings off. "Melissa day it is a different joint" with the hands affecting her the most and then the hips. Hand hurt on and off and located in 2 and 3 MCP and all PIPs. Hips are achy. Has had bursitis before and it is not that lateral hip pain but more generalized and achy. Seeing ortho for lateral epicondylitis and CTS. Continues taking Xeljanz and leflunomide daily and naproxen BID. Has been exercising with . Diet- eat what want but small amounts. Not on synthroid or thyroid supplementation.      ROS:   General: +Fatigue that comes and goes, no unintentional weight loss, no fever or night sweats  HEENT: no oral lesions, dry eyes and dry mouth  CV: no chest pain or pedal edema  RS: no cough, dyspnea or hemoptysis  GI: no GERD (under control), no abdominal pain, no diarrhea or constipation  Neuro: no numbness or weakness  Psyc: no mood changes  Derm: no skin rash or nodules    Objective:      Physical Examination:    Vitals:    05/10/17 1437   BP: 112/70   Pulse: 97     General: Well developed, well nourished  Skin: no rashes or lesions  HEENT: Normocephalic, atraumatic, No oral lesion, No cervical adenopathy, Palpable enlarged thyroid  Lungs: Clear to ausculation, bilaterally and normal respiratory effort  Heart: Regular rate and rhythm, S1, S2 normal, no murmurs  Abdomen: Soft, non-tender non-distented; bowel sounds normal   MSK:   Right Side Rheumatological Exam   Examination finds the shoulder, elbow, 1st PIP, 1st MCP, 4th MCP and 5th MCP normal.  Wrist and show " tenderness without swelling. Tenderness and swelling palpated in 2nd and 3rd MCP and 2nd, 3rd, 4th and 5th PIPs. Swelling without tenderness in knee     Left Side Rheumatological Exam   Examination finds the shoulder, elbow, 1st PIP, 1st MCP, 4th MCP and 5th MCP normal.  Wrist and show tenderness without swelling. Tenderness and swelling palpated in 2nd and 3rd MCP and 2nd, 3rd, 4th and 5th PIPs. Swelling without tenderness in knee.     Neuro: Normal strength- 5/5 all extremities, Tinnel's + BL  Psyc: Normal content and context of speech     Labs:   Results for TONNY GOMEZ (MRN [id number]) as of 5/10/2017 14:53   Ref. Range 5/5/2017 10:58 5/8/2017 16:49 5/8/2017 16:49   WBC Latest Ref Range: 3.90 - 12.70 K/uL 7.79     RBC Latest Ref Range: 4.00 - 5.40 M/uL 4.21     Hemoglobin Latest Ref Range: 12.0 - 16.0 g/dL 9.6 (L)     Hematocrit Latest Ref Range: 37.0 - 48.5 % 31.8 (L)     MCV Latest Ref Range: 82 - 98 fL 76 (L)     MCH Latest Ref Range: 27.0 - 31.0 pg 22.8 (L)     MCHC Latest Ref Range: 32.0 - 36.0 % 30.2 (L)     RDW Latest Ref Range: 11.5 - 14.5 % 19.1 (H)     Platelets Latest Ref Range: 150 - 350 K/uL 373 (H)     MPV Latest Ref Range: 9.2 - 12.9 fL 9.8     Gran% Latest Ref Range: 38.0 - 73.0 % 59.2     Gran # Latest Ref Range: 1.8 - 7.7 K/uL 4.6     Lymph% Latest Ref Range: 18.0 - 48.0 % 28.6     Lymph # Latest Ref Range: 1.0 - 4.8 K/uL 2.2     Mono% Latest Ref Range: 4.0 - 15.0 % 10.8     Mono # Latest Ref Range: 0.3 - 1.0 K/uL 0.8     Eosinophil% Latest Ref Range: 0.0 - 8.0 % 0.6     Eos # Latest Ref Range: 0.0 - 0.5 K/uL 0.1     Basophil% Latest Ref Range: 0.0 - 1.9 % 0.5     Baso # Latest Ref Range: 0.00 - 0.20 K/uL 0.04     Sed Rate Latest Ref Range: 0 - 20 mm/Hr 6     Sodium Latest Ref Range: 136 - 145 mmol/L 142     Potassium Latest Ref Range: 3.5 - 5.1 mmol/L 4.2     Chloride Latest Ref Range: 95 - 110 mmol/L 108     CO2 Latest Ref Range: 23 - 29 mmol/L 26     Anion Gap Latest Ref Range: 8 - 16  mmol/L 8     BUN, Bld Latest Ref Range: 6 - 20 mg/dL 14     Creatinine Latest Ref Range: 0.5 - 1.4 mg/dL 1.0     eGFR if non African American Latest Ref Range: >60 mL/min/1.73 m^2 >60.0     eGFR if African American Latest Ref Range: >60 mL/min/1.73 m^2 >60.0     Glucose Latest Ref Range: 70 - 110 mg/dL 83     Calcium Latest Ref Range: 8.7 - 10.5 mg/dL 9.1     Alkaline Phosphatase Latest Ref Range: 55 - 135 U/L 50 (L)     Total Protein Latest Ref Range: 6.0 - 8.4 g/dL 6.7     Albumin Latest Ref Range: 3.5 - 5.2 g/dL 3.8     Total Bilirubin Latest Ref Range: 0.1 - 1.0 mg/dL 0.3     AST Latest Ref Range: 10 - 40 U/L 20     ALT Latest Ref Range: 10 - 44 U/L 20     CRP Latest Ref Range: 0.0 - 8.2 mg/L 0.3     Pathologist Interpretation UIFE Unknown  REVIEWED    TEODORA 24, Ur Unknown  SEE COMMENT    Calcium, Urine Latest Ref Range: 0.0 - 15.0 mg/dL  3.8    Calcium, 24H Urine Latest Ref Range: 4 - 12 mg/Hr  6    CA Urine (mg/Spec) Latest Units: mg/Spec  148    Urine Protein, Timed Latest Ref Range: 0 - 100 mg/Spec   Unable to calculate   Differential Method Unknown Automated     Protein, Urine Latest Ref Range: 0 - 15 mg/dL   <7   Urine Collection Duration Latest Units: Hr  24 24     Results for TONNY GOMEZ (MRN [id number]) as of 5/10/2017 14:53   Ref. Range 3/15/2017 14:51   TSH Latest Ref Range: 0.400 - 4.000 uIU/mL 0.339 (L)   Free T4 Latest Ref Range: 0.71 - 1.51 ng/dL 1.02   PTH Latest Ref Range: 9.0 - 77.0 pg/mL 120.0 (H)      Results for TONNY GOMEZ (MRN [id number]) as of 5/10/2017 14:53   Ref. Range 3/15/2017 14:51   Vit D, 25-Hydroxy Latest Ref Range: 30 - 96 ng/mL 33     SPEP 3/15/17: No discrete monoclonal peaks identified.     3/15/17: The serum immunofixation shows no evidence of monoclonal/abnormal protein. RJQ    2/7/17:  Mild DJD anterior C1-C2.  Limited primary anterior subluxation C3 on C4, C4 on C5, C5 on C6. Advanced erosive inflammatory arthritis MTP, first metacarpal carpal, radial carpal and  carpal joints, radial ulnar joints.  DJD first PIP joints first toes, and inflammatory arthritis changes right fourth and fifth MTP joints.  Knees normal.    Assessment/Plan:     RA (DAS28= 4.6; [id number]= 4.4)  - Continue Xeljanz and Leflunamide  - Standing labs at next visit  - Call office if new sickness or fevers  - Continue Prednisone at 6mg for 1 week then drop to 5mg and continue    Osteopenia  - PTH elevated  - TSH decreased  - Endocrine referral  - Will hold off on Denosumab as of now until seen by endocrinology  - Pt not candidate for oral bisphosphonate's due to history of gastroparesis and GERD  - Continue Ca diet supplementation  - Vit D WNL     Osteoarthritis, unspecified osteoarthritis type, unspecified site  Spondylosis of lumbar region without myelopathy or radiculopathy  Immunosuppressed status  Gastroparesis    Gastroesophageal reflux disease without esophagitis  - Endoscopy scheduled for end of June    Anemia  - Scheduled colonoscopy end of June

## 2017-05-10 NOTE — H&P
This is a preoperative H and P as well as an office visit.    CHIEF COMPLAINT:  Right hand and elbow pain.    HISTORY OF PRESENT ILLNESS:  Ms. Peterson is a 56-year-old right-hand dominant   female presenting today for evaluation of her right upper extremity pain.  She   reports that she went to therapy, but is still having pain at the elbow.  She is   interested in an injection for her tennis elbow.  She does have RA.  She is   concerned that this is not getting better.  She still has numbness and tingling   of her fingers, mostly in the morning and in the evening time.  She reports   during the day, it is usually okay, but occasionally, it does go numb,   especially positional.  She feels that this is also painful.  She does have pain   that radiates up the volar aspect of her forearm.  She reports most of the   numbness is in the middle and ring finger, but occasionally does have small and   thumb numbness.  She denies any recent trauma.  She is on prednisone.  She   denies nausea, vomiting, fever or chills today.    Past Medical History:   Diagnosis Date    Acid reflux     Allergy     Anemia     Asthma     Coronary artery disease     Degenerative disc disease     Dry eyes     Dry mouth     Gastroparesis     Hyperlipidemia     Lateral meniscus derangement 4/6/2016    Osteoarthritis     Osteoporosis     Rheumatoid arthritis     Umbilical hernia 8/13/2015       Past Surgical History:   Procedure Laterality Date    CHOLECYSTECTOMY  2004    COLONOSCOPY      10/11    TONSILLECTOMY      TUBAL LIGATION  2003    UPPER GASTROINTESTINAL ENDOSCOPY      10/11    uterine ablation  2003       Social History     Social History    Marital status:      Spouse name: N/A    Number of children: N/A    Years of education: N/A     Occupational History    Not on file.     Social History Main Topics    Smoking status: Never Smoker    Smokeless tobacco: Never Used    Alcohol use Yes      Comment:  occasionally    Drug use: No    Sexual activity: Yes     Partners: Male     Birth control/ protection: Surgical     Other Topics Concern    Not on file     Social History Narrative       Current Outpatient Prescriptions on File Prior to Visit   Medication Sig Dispense Refill    albuterol 90 mcg/actuation inhaler Inhale 2 puffs into the lungs every 6 (six) hours as needed for Wheezing. 18 g 11    aspirin 81 mg Tab Take 81 mg by mouth every morning.       azelastine (ASTELIN) 137 mcg nasal spray 1 spray (137 mcg total) by Nasal route 2 (two) times daily. (Patient taking differently: 1 spray by Nasal route 2 (two) times daily as needed. ) 30 mL 11    azithromycin (ZITHROMAX Z-DEVON) 250 MG tablet 2 tablets for the first day then one tablet daily until ZPAK complete. 1 tablet 0    azithromycin (ZITHROMAX Z-DEVON) 250 MG tablet 2 tablets for the first day then one tablet daily until ZPAK complete. 1 tablet 0    budesonide-formoterol 160-4.5 mcg (SYMBICORT) 160-4.5 mcg/actuation HFAA Inhale 2 puffs into the lungs every 4 to 6 hours as needed. 1 Inhaler 12    calcium citrate-vitamin D (CITRACAL + D) 315-200 mg-unit per tablet Take 1 tablet by mouth 2 (two) times daily.       CRESTOR 20 mg tablet Take 1 tablet (20 mg total) by mouth once daily. 90 tablet 3    CYCLOSPORINE (RESTASIS OPHT) Inject 0.05 % into the eye. 1 Dropperette Both eyes Twice a day .  dispense one month supply/sixty-four vials/ two trays      fish oil-omega-3 fatty acids 300-1,000 mg capsule Take 1,400 g by mouth once daily.      flaxseed oil 1,000 mg Cap Take by mouth once daily.      GUAIFENESIN (MUCINEX ORAL) Take 400 mg by mouth every 4 (four) hours as needed.       INV PROPULSID 10 MG Take 20 mg by mouth 4 (four) times daily as directed by physician.  For investigational use only 480 each 2    leflunomide (ARAVA) 20 MG Tab Take 1 tablet (20 mg total) by mouth once daily. 30 tablet 2    montelukast (SINGULAIR) 10 mg tablet TAKE 1 TABLET  AT BEDTIME 30 tablet 1    naproxen (NAPROSYN) 500 MG tablet TAKE 1 TABLET TWICE A DAY 60 tablet 1    omeprazole (PRILOSEC) 40 MG capsule TAKE ONE CAPSULE BY MOUTH EVERY MORNING 30 capsule 6    omeprazole-sodium bicarbonate (ZEGERID) 40-1.1 mg-gram per capsule Take 1 capsule by mouth every morning. 30 capsule 11    POTASSIUM ORAL Take by mouth once daily.      predniSONE (DELTASONE) 5 MG tablet TAKE 1 & 1/2 TABS EVERY DAY 45 tablet 1    progesterone (PROMETRIUM) 100 MG capsule Take 100 mg by mouth every morning. 1 Capsule Oral Every day, morning      promethazine (PHENERGAN) 12.5 MG Tab Take 1 tablet (12.5 mg total) by mouth 4 (four) times daily. 30 tablet 0    rizatriptan (MAXALT) 10 MG tablet Take 10 mg by mouth as needed for Migraine.       sucralfate (CARAFATE) 1 gram tablet Take 1 tablet (1 g total) by mouth 4 (four) times daily. 120 tablet 6    tofacitinib (XELJANZ XR) 11 mg Tb24 Take 1 tablet by mouth once daily. 30 tablet 1    VESICARE 10 mg tablet Take 1 tablet by mouth every morning.       vitamin E 1000 UNIT capsule Take 1,000 Units by mouth once daily.       No current facility-administered medications on file prior to visit.        Review of patient's allergies indicates:   Allergen Reactions    Actemra [tocilizumab] Other (See Comments)     Severe dizziness    Codeine Nausea And Vomiting    Gold au 198 Hives and Rash    Hydroxychloroquine Other (See Comments)     Can't remember the reaction      Iodinated contrast media - oral and iv dye Other (See Comments)     Other reaction(s): BURNING ALL OVER    Iodine Other (See Comments)     Other reaction(s): BURNING ALL OVER - Iodine dye - Not topical    Sulfa (sulfonamide antibiotics) Other (See Comments)     Can't remember the reaction    Zofran [ondansetron hcl (pf)] Nausea And Vomiting     Pt reports that last time she received zofran she started vomiting again    Methotrexate analogues Nausea Only    Pneumovax 23 [pneumococcal 23-argenis  "ps vaccine] Other (See Comments)     "sick"       Review of Systems:  Constitutional: no fever or chills  ENT: no nasal congestion or sore throat  Respiratory: no cough or shortness of breath  Cardiovascular: no chest pain or palpitations  Gastrointestinal: no nausea or vomiting  Genitourinary: no hematuria or dysuria  Integument/Breast: no rash or pruritis  Hematologic/Lymphatic: no easy bruising or lymphadenopathy  Musculoskeletal: see HPI  Neurological: no seizures or tremors  Behavioral/Psych: no auditory or visual hallucinations      PHYSICAL EXAM    Vitals:    05/09/17 1012   BP: 105/70   Pulse: 102   Resp: 18   Weight: 69.9 kg (154 lb)   Height: 5' 3.6" (1.615 m)   PainSc: 0-No pain   PainLoc: Hand       GENERAL:  Well-developed, well-nourished, in no acute distress.  CARDIOVASCULAR:  Regular rate and rhythm.  LUNGS:  Respirations are equal and unlabored.  BEHAVIORAL AND PSYCHIATRIC:  Mood and affect are appropriate.  NEUROLOGIC:  No tremor.  HEENT:  Normocephalic and atraumatic.  MUSCULOSKELETAL:  Upper Extremity Exam:  She is distally neurovascularly intact.    AIN and PIN nerves are intact.  Sensation over the radial, ulnar and median   nerves are equivocal.  She does have a positive Tinel's and positive Durkan's.    She does have tenderness over the lateral epicondyle.  Negative Tinel's at the   elbow.  She does have a predominant ulnar styloid present.  Some tenderness over   the dorsal aspect of the wrist.  She does have a full composite fist.  She does   have a shoulder deformity at the thumb CMC.    ELECTROMYELOGRAM:  On EMG, moderate right carpal tunnel.    ASSESSMENT:  1.  Right carpal tunnel.  2.  Lateral epicondylitis.  3.  Cubital tunnel.    PLAN:  I discussed with Ms. Peterson at this time, we can proceed with surgical   intervention to fix her lateral epicondylitis at the same time as the carpal   tunnel and cubital tunnel; however, would require 6 weeks of casting.  She   reports that because " of her RA, she does not want any type of casting.  We also   discussed elective casting.  She does not want that.  She would like to proceed   with therapy for the elbow, but just wants her carpal tunnel fixed.  We will   proceed with carpal tunnel release and see how she does from that.  As long as   she is doing well, we do not need to proceed with surgical intervention.  We   will have [facility] for her lateral epicondylitis   postoperatively.  The patient wishes to proceed as planned.    DICTATED BY:  ANNETTE Colunga  dd: 05/09/2017 12:05:30 (CDT)  td: 05/10/2017 10:06:27 (CDT)  Doc ID   #[id number]  Job ID #[id number]    CC:

## 2017-05-10 NOTE — PROGRESS NOTES
I have personally taken the history and examined the patient and agree with the resident,s note as stated above     Results for TONNY GOMEZ (MRN [id number]) as of 5/10/2017 18:50   Ref. Range 11/19/2016 11:43   Cholesterol Latest Ref Range: 120 - 199 mg/dL 203 (H)   HDL Latest Ref Range: 40 - 75 mg/dL 98 (H)   LDL Cholesterol Latest Ref Range: 63.0 - 159.0 mg/dL 76.6   Total Cholesterol/HDL Ratio Latest Ref Range: 2.0 - 5.0  2.1   Triglycerides Latest Ref Range: 30 - 150 mg/dL 142     Results for TONNY GOMEZ (MRN [id number]) as of 5/10/2017 18:50   Ref. Range 5/5/2017 10:58 5/8/2017 16:49 5/8/2017 16:49   WBC Latest Ref Range: 3.90 - 12.70 K/uL 7.79     RBC Latest Ref Range: 4.00 - 5.40 M/uL 4.21     Hemoglobin Latest Ref Range: 12.0 - 16.0 g/dL 9.6 (L)     Hematocrit Latest Ref Range: 37.0 - 48.5 % 31.8 (L)     MCV Latest Ref Range: 82 - 98 fL 76 (L)     MCH Latest Ref Range: 27.0 - 31.0 pg 22.8 (L)     MCHC Latest Ref Range: 32.0 - 36.0 % 30.2 (L)     RDW Latest Ref Range: 11.5 - 14.5 % 19.1 (H)     Platelets Latest Ref Range: 150 - 350 K/uL 373 (H)     MPV Latest Ref Range: 9.2 - 12.9 fL 9.8     Gran% Latest Ref Range: 38.0 - 73.0 % 59.2     Gran # Latest Ref Range: 1.8 - 7.7 K/uL 4.6     Lymph% Latest Ref Range: 18.0 - 48.0 % 28.6     Lymph # Latest Ref Range: 1.0 - 4.8 K/uL 2.2     Mono% Latest Ref Range: 4.0 - 15.0 % 10.8     Mono # Latest Ref Range: 0.3 - 1.0 K/uL 0.8     Eosinophil% Latest Ref Range: 0.0 - 8.0 % 0.6     Eos # Latest Ref Range: 0.0 - 0.5 K/uL 0.1     Basophil% Latest Ref Range: 0.0 - 1.9 % 0.5     Baso # Latest Ref Range: 0.00 - 0.20 K/uL 0.04     Sed Rate Latest Ref Range: 0 - 20 mm/Hr 6     Sodium Latest Ref Range: 136 - 145 mmol/L 142     Potassium Latest Ref Range: 3.5 - 5.1 mmol/L 4.2     Chloride Latest Ref Range: 95 - 110 mmol/L 108     CO2 Latest Ref Range: 23 - 29 mmol/L 26     Anion Gap Latest Ref Range: 8 - 16 mmol/L 8     BUN, Bld Latest Ref Range: 6 - 20 mg/dL 14      Creatinine Latest Ref Range: 0.5 - 1.4 mg/dL 1.0     eGFR if non African American Latest Ref Range: >60 mL/min/1.73 m^2 >60.0     eGFR if African American Latest Ref Range: >60 mL/min/1.73 m^2 >60.0     Glucose Latest Ref Range: 70 - 110 mg/dL 83     Calcium Latest Ref Range: 8.7 - 10.5 mg/dL 9.1     Alkaline Phosphatase Latest Ref Range: 55 - 135 U/L 50 (L)     Total Protein Latest Ref Range: 6.0 - 8.4 g/dL 6.7     Albumin Latest Ref Range: 3.5 - 5.2 g/dL 3.8     Total Bilirubin Latest Ref Range: 0.1 - 1.0 mg/dL 0.3     AST Latest Ref Range: 10 - 40 U/L 20     ALT Latest Ref Range: 10 - 44 U/L 20     CRP Latest Ref Range: 0.0 - 8.2 mg/L 0.3     Pathologist Interpretation UIFE Unknown  REVIEWED    TEODORA 24, Ur Unknown  SEE COMMENT    Calcium, Urine Latest Ref Range: 0.0 - 15.0 mg/dL  3.8    Calcium, 24H Urine Latest Ref Range: 4 - 12 mg/Hr  6    CA Urine (mg/Spec) Latest Units: mg/Spec  148    Urine Protein, Timed Latest Ref Range: 0 - 100 mg/Spec   Unable to calculate   Differential Method Unknown Automated     Protein, Urine Latest Ref Range: 0 - 15 mg/dL   <7   Urine Collection Duration Latest Units: Hr  24 24   Results for TONNY GOMEZ (MRN [id number]) as of 5/10/2017 18:50   Ref. Range 5/7/2015 12:37   IgG - Serum Latest Ref Range: 650 - 1600 mg/dL 843   IgM Latest Ref Range: 50 - 300 mg/dL 35 (L)   IgA Latest Ref Range: 40 - 350 mg/dL 149     Notes Recorded by Isiah Moran MD on 2/21/2017 at 1:36 PM  Please tell pt the bone density shows osteopenia which should be treated given her current dose of prednisone. Please schedule brief f/u visit to discuss in next several weeks. Thanks. RJQ          Signed by   Signed Credentials Date/Time    Phone Pager   VARUN LAURA MD 2/21/2017 07:50 [phone]    PACS Images   Show images for DXA Bone Density Spine And Hip_Axial Skeleton   Reviewed By Asaf Moran MD on 2/21/2017  1:37 PM   Patient Result Comments   Entered by Isiah Moran,  MD at 2017  2:37 PM   Read by Joyce Peterson at 2017  4:35 PM   The bone density shows osteopenia which should be treated given your current dose of prednisone. Ronel will schedule brief visit to discuss therapy options for this. RJQ   External Result Report   External Result Report   Narrative   : 1960 ORDERING PHYSICIAN: JARON Moran LOCATION: [facility]    HISTORY: 55 y/o female with no hx of fractures. Pts Mother had a wrist fx. Pt is taking Calcium, Vit D, Estrogen and 7mg of Prednisone. She has a hx of Rheumatoid Arthritis and Asthma. She exercises 3 times a week and does not smoke.    TECHNIQUE: Bone Mineral Density performed using Hologic Horizon A (S/N [id number]) reveals good positioning of lumbar spine and hip.    BONE MINERAL DENSITY RESULTS:  Lumbar Spine: Lumbar bone mineral density L1-L4 is 0.819g/cm2, which is a t-score of -2.1. The z-score is -0.9.    Total Hip: The total hip bone mineral density is 0.868g/cm2.  The t-score is -0.6, and the z-score is 0.1.  Femoral neck BMD is 0.723g/cm2 and the t-score is -1.1.      COMPARISONS:  Date Location BMD T-score  12 L-spine 0.828 -2.0  Total Hip 0.923 -0.2   Impression    Low bone mass/osteopenia of the lumbar spine and femoral neck.  Decrease in hip BMD (-6%) since prior study. FRAX calculations do not suggest treatment.      Recommendations:  1) Adequate calcium and Vitamin D therapy  2) Appropriate exercise  3) Glucocorticoids may adversely affect quality and quantity of bone.    Consider bisphosphonate if patient requires prednisone 7.5 mg or greater for 3 months or more.  4) Consider repeat BMD in 1-2 years    EXPLANATION OF RESULTS:  The t-score compares this results to the bone density of a 25 year old of the same gender. The z-score compares this result to the average bone density to people of the same age and gender. The amounts indicate the number of standard deviations above or below the mean.  * Osteoporosis is  generally defined as having a t-score between less than -2.5.  * Osteopenia is generally defined as having a t-score between -1 and -2.5.  * The normal range is generally defined as having a t-score between -1 to 1.      Electronically signed by: VARUN LAURA MD  Date: 02/21/17  Time: 07:50     Encounter   View Encounter      Reviewed By   Isiah Moran MD on 2/21/2017  1:37 PM     05/08/17 1649       Resulting lab: [facility]     Value: REVIEWED     Comment: Electronically reviewed and signed by:   Yulia Baig MD   Signed on 05/09/17 at 14:41   No monoclonal peaks identified.      03/15/17 1451       Resulting lab: [facility]     Value: REVIEWED     Comment: Electronically reviewed and signed by:   Yulia Baig MD   Signed on 03/16/17 at 16:05   No discrete monoclonal peaks identified.        *Additional information available - comment         Results for TONNY GOMEZ (MRN [id number]) as of 5/10/2017 18:50   Ref. Range 5/16/2012 12:00 8/6/2013 14:10 1/21/2016 14:41 3/31/2016 11:24 3/15/2017 14:51   TSH Latest Ref Range: 0.400 - 4.000 uIU/mL 0.500 0.663 0.302 (L) 0.808 0.339 (L)   Results for TONNY GOMEZ (MRN [id number]) as of 5/10/2017 18:50   Ref. Range 3/15/2017 14:51   PTH Latest Ref Range: 9.0 - 77.0 pg/mL 120.0 (H)   Results for TONNY GOMEZ (MRN [id number]) as of 5/10/2017 18:50   Ref. Range 3/15/2017 14:51   Vit D, 25-Hydroxy Latest Ref Range: 30 - 96 ng/mL 33     The neck x-rays show slight progression of degenerative change. The hands show chronic changes wrists, back of hand and the index and middle finger knuckle joints right> left and the 4,5 toe joints right> left, without significant change I can see. RJQ   External Result Report   External Result Report   Narrative   4 view arthritis survey, comparison 2016.  Mild DJD anterior C1-C2.  Limited primary anterior subluxation C3 on C4, C4 on C5, C5 on  "C6.      Advanced erosive inflammatory arthritis MTP, first metacarpal carpal, radial carpal and carpal joints, radial ulnar joints.  DJD first PIP joints first toes, and inflammatory arthritis changes right fourth and fifth MTP joints.  Knees normal.   Impression    Changes of inflammatory, rheumatoid arthritis, See results above.      Electronically signed by: GUY LUI MD  Date: 02/07/17  Time: 16:23             24 hr urine calcium 148 mg/24h          FRAX: MOF 12% Hip 0.9 %         erosive RA discontinued long term methotrexate b/o nausea with po and sc and reduced dose, tolerating leflunomide and tofacitinib.Has failed 3 TNFi(infliximab, etanercept, adalimumab), abatacept, hypogamma with rituximab, dizziness with tocilizumab  Secondary Sjogren's now with punctal plugs and Restasis, seeing Jamila Armas DAS28: 4.95 [id number] 4.75(both moderate activity) despite prednisone 6mg daily  Secondary Sjogren's  coronary ASCVD  dilated esophagus, dysmotility, gastroparesis and iron deficiency anemia  to have EGD and colonoscopy with Dr. Moore  osteopenia < NOF FRAX therapy threshold and is on HRT, tapering down/off low dose prednisone intolerant of oral bisphosphonates. No need for denosumab or zolendronic acid for this reason, and would like to clarify role of PTH and low TSH h/o hypocalcemia which both of these agents can cause, favor T1/2 of denosumab but only after above.  Elevated iPTH with normal vitamin D, eGFR, 24 hr urine calcium ??? Needs f/u in Endo, former patient of Dr. Horton now retired.  right CTS using thumb brace rather than wrist brace, .   Iron deficiency anemia  Osteopenia with moderate fracture risk on prednisone 6mg daily to decrease next week to prednisone 5mg daily, and only a candidate for Rx b/o prednisone. Also is taking estrogen-progesterone which is protective.      F/u Endocrinology for thyroid nodule ? Intermittently "hot" overproducing T4, elevated iPTH ? Cause  Decrease " prednisone to 5mg daily next week and continue  Cont tofacitinib-XR 11 mg daily   Cont leflunomide 20mg daily  Cont exercise program  Declines trochanteric bursal injections'  EGD and colonoscopy by Dr. Moore for moderate iron deficiency anemia, persistent  RTC 3 months with standing labs

## 2017-05-11 ENCOUNTER — TELEPHONE (OUTPATIENT)
Dept: ORTHOPEDICS | Facility: CLINIC | Age: 57
End: 2017-05-11

## 2017-05-11 DIAGNOSIS — G56.01 CARPAL TUNNEL SYNDROME, RIGHT: Primary | ICD-10-CM

## 2017-05-11 NOTE — TELEPHONE ENCOUNTER
----- Message from Sara Finn sent at 5/11/2017  9:30 AM CDT -----  _  1st Request  _  2nd Request  _  3rd Request        Who: Patient    Why: pt is calling to reschedule her surgery on 5/29/17, because it is Memorial Day    What Number to Call Back:[phone]    When to Expect a call back: (Before the end of the day)   -- if the call is after 12:00, the call back will be tomorrow.

## 2017-05-15 ENCOUNTER — OFFICE VISIT (OUTPATIENT)
Dept: PODIATRY | Facility: CLINIC | Age: 57
End: 2017-05-15
Payer: COMMERCIAL

## 2017-05-15 VITALS
HEART RATE: 97 BPM | SYSTOLIC BLOOD PRESSURE: 109 MMHG | BODY MASS INDEX: 26.93 KG/M2 | WEIGHT: 152 LBS | DIASTOLIC BLOOD PRESSURE: 77 MMHG | HEIGHT: 63 IN

## 2017-05-15 DIAGNOSIS — M72.2 PLANTAR FASCIITIS: Primary | ICD-10-CM

## 2017-05-15 DIAGNOSIS — M21.41 PES PLANUS OF BOTH FEET: ICD-10-CM

## 2017-05-15 DIAGNOSIS — M21.42 PES PLANUS OF BOTH FEET: ICD-10-CM

## 2017-05-15 PROCEDURE — 20550 NJX 1 TENDON SHEATH/LIGAMENT: CPT | Mod: RT,S$GLB,, | Performed by: PODIATRIST

## 2017-05-15 PROCEDURE — 1160F RVW MEDS BY RX/DR IN RCRD: CPT | Mod: S$GLB,,, | Performed by: PODIATRIST

## 2017-05-15 PROCEDURE — 99999 PR PBB SHADOW E&M-EST. PATIENT-LVL IV: CPT | Mod: PBBFAC,,, | Performed by: PODIATRIST

## 2017-05-15 PROCEDURE — 99213 OFFICE O/P EST LOW 20 MIN: CPT | Mod: 25,S$GLB,, | Performed by: PODIATRIST

## 2017-05-15 RX ORDER — DEXAMETHASONE SODIUM PHOSPHATE 4 MG/ML
4 INJECTION, SOLUTION INTRA-ARTICULAR; INTRALESIONAL; INTRAMUSCULAR; INTRAVENOUS; SOFT TISSUE ONCE
Status: COMPLETED | OUTPATIENT
Start: 2017-05-15 | End: 2017-05-15

## 2017-05-15 RX ORDER — TRIAMCINOLONE ACETONIDE 40 MG/ML
40 INJECTION, SUSPENSION INTRA-ARTICULAR; INTRAMUSCULAR ONCE
Status: COMPLETED | OUTPATIENT
Start: 2017-05-15 | End: 2017-05-15

## 2017-05-15 RX ADMIN — DEXAMETHASONE SODIUM PHOSPHATE 4 MG: 4 INJECTION, SOLUTION INTRA-ARTICULAR; INTRALESIONAL; INTRAMUSCULAR; INTRAVENOUS; SOFT TISSUE at 11:05

## 2017-05-15 RX ADMIN — TRIAMCINOLONE ACETONIDE 40 MG: 40 INJECTION, SUSPENSION INTRA-ARTICULAR; INTRAMUSCULAR at 11:05

## 2017-05-15 NOTE — PROGRESS NOTES
CC:   heel pain      HPI:       Joyce Peterson is a 56 y.o. female who presents to clinic complaining of right  heel pain for since Thursday.   Pain is worse with weight bearing and walking.   Pain is better with rest.  Patient denies acute trauma to the affected area.   Treatment tried: none.    Had custom foot orthotics from Dr. Nevarez years ago for pes planus and PTTD.   history of Rheumatoid Arthritis         Past Medical History:   Diagnosis Date    Acid reflux     Allergy     Anemia     Asthma     Coronary artery disease     Degenerative disc disease     Dry eyes     Dry mouth     Gastroparesis     Hyperlipidemia     Lateral meniscus derangement 4/6/2016    Osteoarthritis     Osteoporosis     Rheumatoid arthritis     Umbilical hernia 8/13/2015         Current Outpatient Prescriptions on File Prior to Visit   Medication Sig Dispense Refill    albuterol 90 mcg/actuation inhaler Inhale 2 puffs into the lungs every 6 (six) hours as needed for Wheezing. 18 g 11    aspirin 81 mg Tab Take 81 mg by mouth every morning.       azelastine (ASTELIN) 137 mcg nasal spray 1 spray (137 mcg total) by Nasal route 2 (two) times daily. (Patient taking differently: 1 spray by Nasal route 2 (two) times daily as needed. ) 30 mL 11    azithromycin (ZITHROMAX Z-DEVON) 250 MG tablet 2 tablets for the first day then one tablet daily until ZPAK complete. 1 tablet 0    azithromycin (ZITHROMAX Z-DEVON) 250 MG tablet 2 tablets for the first day then one tablet daily until ZPAK complete. 1 tablet 0    budesonide-formoterol 160-4.5 mcg (SYMBICORT) 160-4.5 mcg/actuation HFAA Inhale 2 puffs into the lungs every 4 to 6 hours as needed. 1 Inhaler 12    calcium citrate-vitamin D (CITRACAL + D) 315-200 mg-unit per tablet Take 1 tablet by mouth 2 (two) times daily.       CRESTOR 20 mg tablet Take 1 tablet (20 mg total) by mouth once daily. 90 tablet 3    CYCLOSPORINE (RESTASIS OPHT) Inject 0.05 % into the eye. 1 Dropperette  Both eyes Twice a day .  dispense one month supply/sixty-four vials/ two trays      fish oil-omega-3 fatty acids 300-1,000 mg capsule Take 1,400 g by mouth once daily.      flaxseed oil 1,000 mg Cap Take by mouth once daily.      GUAIFENESIN (MUCINEX ORAL) Take 400 mg by mouth every 4 (four) hours as needed.       INV PROPULSID 10 MG Take 20 mg by mouth 4 (four) times daily as directed by physician.  For investigational use only 480 each 2    leflunomide (ARAVA) 20 MG Tab Take 1 tablet (20 mg total) by mouth once daily. 30 tablet 2    montelukast (SINGULAIR) 10 mg tablet TAKE 1 TABLET AT BEDTIME 30 tablet 1    naproxen (NAPROSYN) 500 MG tablet Take 1 tablet (500 mg total) by mouth 2 (two) times daily. 60 tablet 1    omeprazole (PRILOSEC) 40 MG capsule TAKE ONE CAPSULE BY MOUTH EVERY MORNING 30 capsule 6    omeprazole-sodium bicarbonate (ZEGERID) 40-1.1 mg-gram per capsule Take 1 capsule by mouth every morning. 30 capsule 11    POTASSIUM ORAL Take by mouth once daily.      predniSONE (DELTASONE) 5 MG tablet Take 1 tablet (5 mg total) by mouth once daily. 30 tablet 3    progesterone (PROMETRIUM) 100 MG capsule Take 100 mg by mouth every morning. 1 Capsule Oral Every day, morning      promethazine (PHENERGAN) 12.5 MG Tab Take 1 tablet (12.5 mg total) by mouth 4 (four) times daily. 30 tablet 0    rizatriptan (MAXALT) 10 MG tablet Take 10 mg by mouth as needed for Migraine.       sucralfate (CARAFATE) 1 gram tablet Take 1 tablet (1 g total) by mouth 4 (four) times daily. 120 tablet 6    tofacitinib (XELJANZ XR) 11 mg Tb24 Take 1 tablet by mouth once daily. 30 tablet 1    VESICARE 10 mg tablet Take 1 tablet by mouth every morning.       vitamin E 1000 UNIT capsule Take 1,000 Units by mouth once daily.       No current facility-administered medications on file prior to visit.            Review of patient's allergies indicates:   Allergen Reactions    Actemra [tocilizumab] Other (See Comments)     Severe  "dizziness    Codeine Nausea And Vomiting    Gold au 198 Hives and Rash    Hydroxychloroquine Other (See Comments)     Can't remember the reaction      Iodinated contrast media - oral and iv dye Other (See Comments)     Other reaction(s): BURNING ALL OVER    Iodine Other (See Comments)     Other reaction(s): BURNING ALL OVER - Iodine dye - Not topical    Sulfa (sulfonamide antibiotics) Other (See Comments)     Can't remember the reaction    Zofran [ondansetron hcl (pf)] Nausea And Vomiting     Pt reports that last time she received zofran she started vomiting again    Methotrexate analogues Nausea Only    Pneumovax 23 [pneumococcal 23-argenis ps vaccine] Other (See Comments)     "sick"             ROS:  General ROS: negative for  chills, fatigue or fever  Cardiovascular ROS: no chest pain or dyspnea on exertion  Musculoskeletal ROS: negative for joint pain or joint stiffness.  Negative for loss of strength.  Positive for foot pain.   Neuro ROS: Negative for syncope, numbness, or muscle weakness  Skin ROS: Negative for rash, itching or nail/hair changes.           OBJECTIVE:         Vitals:    05/15/17 1107   BP: 109/77   Pulse: 97   Weight: 68.9 kg (152 lb)   Height: 5' 3" (1.6 m)        Right Lower extremity exam:  Vasc: Palpable pedal pulses. Feet appropriately warm to touch. Cap refill time is within normal limits   Neurological:  Light touch, proprioception, and Sharp/dull sensation are all intact. There is no Tinel's along the tarsal tunnel.    Derm: No open lesions, macerations, or rashes.  MSK:  Palpable pain plantar medial tubercle of the calcaneus right, Non-palpable pain plantar lateral tubercle of the calcaneus right, Palpable pain medial band of plantar fascia right and right Pes Planus There is no pain with the lateral heel squeeze/compression  test.             ASSESSMENT/PLAN:      I counseled the patient on her conditions, their implications and medical management.      Plantar fasciitis - Right " Foot  -     ORTHOTIC DEVICE (DME)  -     dexamethasone injection 4 mg; Inject 1 mL (4 mg total) into the muscle once.  -     triamcinolone acetonide injection 40 mg; Inject 1 mL (40 mg total) into the muscle once.    Pes planus of both feet  -     ORTHOTIC DEVICE (DME)  -     dexamethasone injection 4 mg; Inject 1 mL (4 mg total) into the muscle once.  -     triamcinolone acetonide injection 40 mg; Inject 1 mL (40 mg total) into the muscle once.       Discussed different treatment options for heel pain. I gave written and verbal instructions on stretching exercises.   Patient expressed understanding. Discussed icing the affected area as needed and also wearing appropriate shoe gear and avoiding flats, slippers, sandals, and going barefoot.  We also discussed cortisone injections and NSAID therapy.    Patient would like injection today.  With patient's permission,  a cortisone injection was performed at the right  heel, medial approach, using 3ccs of mixture of (1cc 1% plain Lidocaine :  1cc 0.% plain Marcaine  :  0.5cc kenalog-40 : 0.5cc dexamethasone).   This was well tolerated.       RTC in 6-8 weeks if no improvement. Patient is amenable to plan.

## 2017-05-17 ENCOUNTER — OFFICE VISIT (OUTPATIENT)
Dept: ENDOCRINOLOGY | Facility: CLINIC | Age: 57
End: 2017-05-17
Payer: COMMERCIAL

## 2017-05-17 VITALS
BODY MASS INDEX: 28.56 KG/M2 | DIASTOLIC BLOOD PRESSURE: 70 MMHG | WEIGHT: 151.25 LBS | HEART RATE: 96 BPM | SYSTOLIC BLOOD PRESSURE: 106 MMHG | HEIGHT: 61 IN

## 2017-05-17 DIAGNOSIS — E04.1 THYROID NODULE: Primary | ICD-10-CM

## 2017-05-17 DIAGNOSIS — M85.80 OSTEOPENIA, UNSPECIFIED LOCATION: ICD-10-CM

## 2017-05-17 DIAGNOSIS — R79.89 ELEVATED PARATHYROID HORMONE: ICD-10-CM

## 2017-05-17 DIAGNOSIS — E05.90 SUBCLINICAL HYPERTHYROIDISM: ICD-10-CM

## 2017-05-17 PROCEDURE — 99214 OFFICE O/P EST MOD 30 MIN: CPT | Mod: S$GLB,,, | Performed by: INTERNAL MEDICINE

## 2017-05-17 PROCEDURE — 99999 PR PBB SHADOW E&M-EST. PATIENT-LVL III: CPT | Mod: PBBFAC,,, | Performed by: INTERNAL MEDICINE

## 2017-05-17 PROCEDURE — 1160F RVW MEDS BY RX/DR IN RCRD: CPT | Mod: S$GLB,,, | Performed by: INTERNAL MEDICINE

## 2017-05-17 RX ORDER — ESTROGENS, CONJUGATED 0.62 MG/1
0.62 TABLET, FILM COATED ORAL DAILY
Refills: 11 | COMMUNITY
Start: 2017-04-25 | End: 2018-06-29 | Stop reason: ALTCHOICE

## 2017-05-17 NOTE — PROGRESS NOTES
Subjective:     Patient ID: Joyce Peterson is a 56 y.o. female.    Chief Complaint: Thyroid Nodule and Osteopenia    HPI:   Ms. Peterson is a 56 y.o. female who is here for a follow-up visit for evaluation osteopenia and thyroid nodules.     US of thyroid no change since 2012  Dominant nodule right inf, 1.4 cm. Left lobe has multiple subcentimeter nodules.   FNA 8/6/12 Benign.  No hx of thyroid cancer in family  No hx of radiation exposure  Denies personal history of breast or colon cancer.     Has subclinical hyperthyroidism in the past, 0.3 - 0.8. Review of medical chart demonstrates borderline labs since 2007. Reviewed symptoms of thyrotoxicosis and none are present.     Osteopenia on steroids and taking estrogen and progesterone ( reports a small decrease in premarin two years ago) and calcium. Staying active, (strength training class), no falls or fractures. Recent DXA demonstrates -6% decline in BMD at the total hip. Trying to lose weight  5 lbs.  Prednisone dose fluctuates but in the past six months has ranged between 5 - 7.5 mg daily). Five foot fractures in the distant past.      RA back on meds no change follows with Dr. Moran every three months.      Review of Systems   Constitutional: Negative for chills and fever.   HENT: Negative for congestion and sinus pressure.    Eyes: Negative for visual disturbance.   Respiratory: Negative for chest tightness and shortness of breath.    Cardiovascular: Negative for chest pain, palpitations and leg swelling.   Gastrointestinal: Negative for abdominal pain and vomiting.   Genitourinary: Negative for dysuria.   Musculoskeletal: Negative for arthralgias.   Skin: Negative for rash.   Neurological: Negative for weakness.   Hematological: Does not bruise/bleed easily.   Psychiatric/Behavioral: Negative for sleep disturbance.     Objective:     Physical Exam   Constitutional: She is oriented to person, place, and time. She appears well-developed and well-nourished.  "No distress.   HENT:   Head: Normocephalic and atraumatic.   Nose: Nose normal.   Mouth/Throat: Oropharynx is clear and moist. No oropharyngeal exudate.   Candida of hard palate   Eyes: Conjunctivae and EOM are normal. Pupils are equal, round, and reactive to light. No scleral icterus.   Neck: Normal range of motion. Neck supple. Thyromegaly:  mild tenderness with palpation of right lobe, left is normal.    Cardiovascular: Normal rate, regular rhythm, normal heart sounds and intact distal pulses.    Pulmonary/Chest: Effort normal and breath sounds normal.   Abdominal: Soft. Bowel sounds are normal. She exhibits no distension.   Musculoskeletal: Normal range of motion. She exhibits no edema or tenderness.   No tremor   Lymphadenopathy:     She has no cervical adenopathy.   Neurological: She is alert and oriented to person, place, and time. She has normal reflexes.   Skin: Skin is warm and dry.   Psychiatric: She has a normal mood and affect.       Vitals:    05/17/17 1340   BP: 106/70   BP Location: Left arm   Patient Position: Sitting   BP Method: Manual   Pulse: 96   Weight: 68.6 kg (151 lb 3.8 oz)   Height: 5' 1" (1.549 m)     US thyroid gland 2016  The right thyroid lobe measures approximately 5 x 2 x 2 cm.  Normal vascularity is present.  There is an approximately 1.4 cm ovoid hypoechoic nodule with internal vascularity in the lower pole.  This appears stable in size and contains small echogenic foci, consistent with microcalcifications or colloid.  Three, additional hypoechoic and cystic nodules are present measuring up to 1 cm.    The isthmus measures approximately 3 mm in AP dimension.    The left thyroid lobe measures approximately 5 x 2 x 2 cm.  Normal vascularity is present.  It contains two, subcentimeter hypoechoic nodules measuring 5 and 7 mm, respectively.   Low bone mass/osteopenia of the lumbar spine and femoral neck.  Decrease in hip BMD (-6%) since prior study. FRAX calculations do not suggest " treatment.      Recommendations:  1) Adequate calcium and Vitamin D therapy  2) Appropriate exercise  3) Glucocorticoids may adversely affect quality and quantity of bone.    Consider bisphosphonate if patient requires prednisone 7.5 mg or greater for 3 months or more.  4) Consider repeat BMD in 1-2 years    Results for TONNY PETERSON (MRN [id number]) as of 5/17/2017 13:54   Ref. Range 3/15/2017 14:51   TSH Latest Ref Range: 0.400 - 4.000 uIU/mL 0.339 (L)   Free T4 Latest Ref Range: 0.71 - 1.51 ng/dL 1.02   PTH Latest Ref Range: 9.0 - 77.0 pg/mL 120.0 (H)   Results for TONNY PETERSON (MRN [id number]) as of 5/23/2017 09:20   Ref. Range 3/15/2017 14:51   BUN, Bld Latest Ref Range: 6 - 20 mg/dL 17   Creatinine Latest Ref Range: 0.5 - 1.4 mg/dL 1.0   eGFR if non African American Latest Ref Range: >60 mL/min/1.73 m^2 >60.0   eGFR if African American Latest Ref Range: >60 mL/min/1.73 m^2 >60.0   Glucose Latest Ref Range: 70 - 110 mg/dL 101   Calcium Latest Ref Range: 8.7 - 10.5 mg/dL 8.9   Results for TONNY PETERSON (MRN [id number]) as of 5/23/2017 09:20   Ref. Range 3/15/2017 14:51   Vit D, 25-Hydroxy Latest Ref Range: 30 - 96 ng/mL 33     24 hr urine calcium 148 mg/specimen  Assessment/Plan:       Ms. Peterson is a 56 year old woman with mild subclinical hyperthyroidism, osteopenia, bone loss and a single elevated parathyroid hormone.    Would like to repeat serum PTH, calcium and phos levels. No evidence of hypercalciuria.     Bone loss in the setting of osteopenia while on estrogen/progesterone. Reports a recent decline in BMD.   Agree with continuing E/P.     PLAN:  PTH, renal function panel and TSH in three months.   Given osteopenia would consider treating subclinical hyperthyroidism to prevent accelerated bone loss.

## 2017-05-17 NOTE — MR AVS SNAPSHOT
[facility] - Endo/Diab/Metab  [address]  [place] [address]  Phone: [phone]  Fax: [phone]                  Joyce Peterson   2017 1:30 PM   Office Visit    Description:  Female : 1960   Provider:  Malika Forbes MD   Department:  [facility] - Endo/Diab/Metab           Reason for Visit     Thyroid Nodule     Osteopenia           Diagnoses this Visit        Comments    Thyroid nodule    -  Primary     Elevated parathyroid hormone         Subclinical hyperthyroidism         Osteopenia, unspecified location                To Do List           Future Appointments        Provider Department Dept Phone    2017 1:00 PM Dian Moy OT [facility] [address] [phone]    2017 9:30 AM Lani Laguerre MD [facility] - Pulmonary Services [phone]    2017 10:00 AM LAB, [facility] [phone]    2017 10:00 AM Isiah Moran MD [facility] - Rheumatology [phone]      Your Future Surgeries/Procedures     May 29, 2017   Surgery with Staci Yarbrough MD   [facility]    [address]  [facility] [address] [phone]            2017   Surgery with López Moore MD   [facility] ([facility])    [address] [facility] [address] [phone]              Goals (5 Years of Data)     None      [facility]     [facility] Nurse Care Line - 24 Assistance  Unless otherwise directed by your provider, please contact [facility], our nurse care line that is available for / assistance.     Registered nurses in the [facility] provide: appointment scheduling, clinical advisement, health education, and other advisory services.  Call: [phone] (toll free)               Medications           Message regarding Medications     Verify the changes and/or additions to your medication regime listed  below are the same as discussed with your clinician today.  If any of these changes or additions are incorrect, please notify your healthcare provider.             Verify that the below list of medications is an accurate representation of the medications you are currently taking.  If none reported, the list may be blank. If incorrect, please contact your healthcare provider. Carry this list with you in case of emergency.           Current Medications     albuterol 90 mcg/actuation inhaler Inhale 2 puffs into the lungs every 6 (six) hours as needed for Wheezing.    aspirin 81 mg Tab Take 81 mg by mouth every morning.     azelastine (ASTELIN) 137 mcg nasal spray 1 spray (137 mcg total) by Nasal route 2 (two) times daily.    azithromycin (ZITHROMAX Z-DEVON) 250 MG tablet 2 tablets for the first day then one tablet daily until ZPAK complete.    azithromycin (ZITHROMAX Z-DEVON) 250 MG tablet 2 tablets for the first day then one tablet daily until ZPAK complete.    budesonide-formoterol 160-4.5 mcg (SYMBICORT) 160-4.5 mcg/actuation HFAA Inhale 2 puffs into the lungs every 4 to 6 hours as needed.    calcium citrate-vitamin D (CITRACAL + D) 315-200 mg-unit per tablet Take 1 tablet by mouth 2 (two) times daily.     CRESTOR 20 mg tablet Take 1 tablet (20 mg total) by mouth once daily.    CYCLOSPORINE (RESTASIS OPHT) Inject 0.05 % into the eye. 1 Dropperette Both eyes Twice a day .  dispense one month supply/sixty-four vials/ two trays    fish oil-omega-3 fatty acids 300-1,000 mg capsule Take 1,400 g by mouth once daily.    flaxseed oil 1,000 mg Cap Take by mouth once daily.    GUAIFENESIN (MUCINEX ORAL) Take 400 mg by mouth every 4 (four) hours as needed.     INV PROPULSID 10 MG Take 20 mg by mouth 4 (four) times daily as directed by physician.  For investigational use only    leflunomide (ARAVA) 20 MG Tab Take 1 tablet (20 mg total) by mouth once daily.    montelukast (SINGULAIR) 10 mg tablet TAKE 1 TABLET AT BEDTIME    naproxen  "(NAPROSYN) 500 MG tablet Take 1 tablet (500 mg total) by mouth 2 (two) times daily.    omeprazole (PRILOSEC) 40 MG capsule TAKE ONE CAPSULE BY MOUTH EVERY MORNING    omeprazole-sodium bicarbonate (ZEGERID) 40-1.1 mg-gram per capsule Take 1 capsule by mouth every morning.    POTASSIUM ORAL Take by mouth once daily.    predniSONE (DELTASONE) 5 MG tablet Take 1 tablet (5 mg total) by mouth once daily.    PREMARIN 0.625 mg tablet Take 0.625 mg by mouth once daily.    progesterone (PROMETRIUM) 100 MG capsule Take 100 mg by mouth every morning. 1 Capsule Oral Every day, morning    promethazine (PHENERGAN) 12.5 MG Tab Take 1 tablet (12.5 mg total) by mouth 4 (four) times daily.    rizatriptan (MAXALT) 10 MG tablet Take 10 mg by mouth as needed for Migraine.     sucralfate (CARAFATE) 1 gram tablet Take 1 tablet (1 g total) by mouth 4 (four) times daily.    tofacitinib (XELJANZ XR) 11 mg Tb24 Take 1 tablet by mouth once daily.    VESICARE 10 mg tablet Take 1 tablet by mouth every morning.     vitamin E 1000 UNIT capsule Take 1,000 Units by mouth once daily.           Clinical Reference Information           Your Vitals Were     BP Pulse Height Weight BMI    106/70 (BP Location: Left arm, Patient Position: Sitting, BP Method: Manual) 96 5' 1" (1.549 m) 68.6 kg (151 lb 3.8 oz) 28.58 kg/m2      Blood Pressure          Most Recent Value    BP  106/70      Allergies as of 5/17/2017     Actemra [Tocilizumab]    Codeine    Gold Au 198    Hydroxychloroquine    Iodinated Contrast Media - Oral And Iv Dye    Iodine    Sulfa (Sulfonamide Antibiotics)    Zofran [Ondansetron Hcl (Pf)]    Methotrexate Analogues    Pneumovax 23 [Pneumococcal 23-argenis Ps Vaccine]      Immunizations Administered on Date of Encounter - 5/17/2017     None      Orders Placed During Today's Visit     Future Labs/Procedures Expected by Expires    PTH, intact  5/17/2017 5/17/2018    Renal function panel  5/17/2017 7/16/2018    TSH  5/17/2017 7/16/2018      Language " Assistance Services     ATTENTION: Language assistance services are available, free of charge. Please call [phone].      ATENCIÓN: Si habla geraldoañol, tiene a hernández disposición servicios gratuitos de asistencia lingüística. Llame al [phone].     CHÚ Ý: N?u b?n nói Ti?ng Vi?t, có các d?ch v? h? tr? ngôn ng? mi?n phí dành cho b?n. G?i s? [phone].         [facility] complies with applicable Federal civil rights laws and does not discriminate on the basis of race, color, national origin, age, disability, or sex.

## 2017-05-17 NOTE — LETTER
May 20, 2017      Artie Corea MD  [address]  [facility] [address]           [facility]Diab[facility]Metab  [address]  [facility] [address]  Phone: [phone]  Fax: [phone]          Patient: Joyce Peterson   MR Number: [id number]   YOB: 1960   Date of Visit: 5/17/2017       Dear Dr. Artie Corea:    Thank you for referring Joyce Peterson to me for evaluation. Attached you will find relevant portions of my assessment and plan of care.    If you have questions, please do not hesitate to call me. I look forward to following Joyce Peterson along with you.    Sincerely,        Enclosure  CC:  No Recipients    If you would like to receive this communication electronically, please contact [email or web address] or [phone] to request more information on [facility] access.    For providers and/or their staff who would like to refer a patient to [facility], please contact us through our one-stop-shop provider referral line, [facility], at [phone].    If you feel you have received this communication in error or would no longer like to receive these types of communications, please e-mail [email or web address]

## 2017-05-21 ENCOUNTER — PATIENT MESSAGE (OUTPATIENT)
Dept: PODIATRY | Facility: CLINIC | Age: 57
End: 2017-05-21

## 2017-05-23 ENCOUNTER — PATIENT MESSAGE (OUTPATIENT)
Dept: RHEUMATOLOGY | Facility: CLINIC | Age: 57
End: 2017-05-23

## 2017-05-24 RX ORDER — TRAMADOL HYDROCHLORIDE 50 MG/1
TABLET ORAL
Qty: 30 TABLET | Refills: 0 | Status: SHIPPED | OUTPATIENT
Start: 2017-05-24 | End: 2017-09-14 | Stop reason: SDUPTHER

## 2017-05-26 ENCOUNTER — ANESTHESIA EVENT (OUTPATIENT)
Dept: SURGERY | Facility: OTHER | Age: 57
End: 2017-05-26
Payer: COMMERCIAL

## 2017-05-26 ENCOUNTER — HOSPITAL ENCOUNTER (OUTPATIENT)
Dept: PREADMISSION TESTING | Facility: OTHER | Age: 57
Discharge: HOME | End: 2017-05-26
Attending: ORTHOPAEDIC SURGERY
Payer: COMMERCIAL

## 2017-05-26 VITALS
OXYGEN SATURATION: 98 % | DIASTOLIC BLOOD PRESSURE: 70 MMHG | RESPIRATION RATE: 16 BRPM | HEART RATE: 70 BPM | BODY MASS INDEX: 28.13 KG/M2 | WEIGHT: 149 LBS | SYSTOLIC BLOOD PRESSURE: 123 MMHG | TEMPERATURE: 98 F | HEIGHT: 61 IN

## 2017-05-26 RX ORDER — FAMOTIDINE 20 MG/1
20 TABLET, FILM COATED ORAL
Status: CANCELLED | OUTPATIENT
Start: 2017-05-26 | End: 2017-05-26

## 2017-05-26 RX ORDER — SODIUM CHLORIDE, SODIUM LACTATE, POTASSIUM CHLORIDE, CALCIUM CHLORIDE 600; 310; 30; 20 MG/100ML; MG/100ML; MG/100ML; MG/100ML
INJECTION, SOLUTION INTRAVENOUS CONTINUOUS
Status: CANCELLED | OUTPATIENT
Start: 2017-05-26

## 2017-05-26 NOTE — ANESTHESIA PREPROCEDURE EVALUATION
05/26/2017  Joyce Peterson is a 56 y.o., female.    Anesthesia Evaluation    I have reviewed the Patient Summary Reports.    I have reviewed the Nursing Notes.   I have reviewed the Medications.     Review of Systems  Anesthesia Hx:  No problems with previous Anesthesia  Denies Family Hx of Anesthesia complications.   Denies Personal Hx of Anesthesia complications.   Social:  Non-Smoker    Hematology/Oncology:     Oncology Normal    -- Anemia:   EENT/Dental:   chronic allergic rhinitis   Cardiovascular:   Calcifications in coronaries without symptoms   Pulmonary:   Asthma mild Rare rescue inhaler use   Renal/:  Renal/ Normal     Hepatic/GI:   GERD, well controlled    Musculoskeletal:   Arthritis     Neurological:  Neurology Normal    Endocrine:   Thyroid nodules and being followed        Physical Exam  General:  Well nourished    Airway/Jaw/Neck:  Airway Findings: Mouth Opening: Normal Tongue: Normal  General Airway Assessment: Adult  Mallampati: I  TM Distance: Normal, at least 6 cm         Dental:  Dental Findings: In tact             Anesthesia Plan  Type of Anesthesia, risks & benefits discussed:  Anesthesia Type:  general  Patient's Preference:   Intra-op Monitoring Plan: standard ASA monitors  Intra-op Monitoring Plan Comments:   Post Op Pain Control Plan:   Post Op Pain Control Plan Comments:   Induction:   IV  Beta Blocker:         Informed Consent: Patient understands risks and agrees with Anesthesia plan.  Questions answered. Anesthesia consent signed with patient.  ASA Score: 3     Day of Surgery Review of History & Physical:    H&P update referred to the surgeon.         Ready For Surgery From Anesthesia Perspective.

## 2017-05-26 NOTE — DISCHARGE INSTRUCTIONS
PRE-ADMIT TESTING -  [phone]    [address]  [facility]        OUTPATIENT SURGERY UNIT - [phone]    Your surgery has been scheduled at [facility]. We are pleased to have the opportunity to serve you. For Further Information please call [phone].    On the day of surgery please report to the Information Desk on the 1st floor.    · CONTACT YOUR PHYSICIAN'S OFFICE THE DAY PRIOR TO YOUR SURGERY TO OBTAIN YOUR ARRIVAL TIME.     · The evening before surgery do not eat anything after 9 p.m. ( this includes hard candy, chewing gum and mints).  You may only have GATORADE, POWERADE AND WATER  from 9 p.m. until you leave your home.   DO NOT DRINK ANY LIQUIDS ON THE WAY TO THE HOSPITAL.      SPECIAL MEDICATION INSTRUCTIONS: TAKE medications checked off by the Anesthesiologist on your Medication List.    Angiogram Patients: Take medications as instructed by your physician, including aspirin.     Surgery Patients:    If you take ASPIRIN - Your PHYSICIAN/SURGEON will need to inform you IF/OR when you need to stop taking aspirin prior to your surgery.     Do Not take any medications containing IBUPROFEN.  Do Not Wear any make-up or dark nail polish   (especially eye make-up) to surgery. If you come to surgery with makeup on you will be required to remove the makeup or nail polish.    Do not shave your surgical area at least 5 days prior to your surgery. The surgical prep will be performed at the hospital according to Infection Control regulations.    Leave all valuables at home.   Do Not wear any jewelry or watches, including any metal in body piercings.  Contact Lens must be removed before surgery. Either do not wear the contact lens or bring a case and solution for storage.  Please bring a container for eyeglasses or dentures as required.  Bring any paperwork your physician has provided, such as consent forms,  history and physicals, doctor's orders, etc.   Bring comfortable clothes  that are loose fitting to wear upon discharge. Take into consideration the type of surgery being performed.  Maintain your diet as advised per your physician the day prior to surgery.      Adequate rest the night before surgery is advised.   Park in the Parking lot behind the hospital or in the [facility] across the street from the parking lot. Parking is complimentary.  If you will be discharged the same day as your procedure, please arrange for a responsible adult to drive you home or to accompany you if traveling by taxi.   YOU WILL NOT BE PERMITTED TO DRIVE OR TO LEAVE THE HOSPITAL ALONE AFTER SURGERY.   It is strongly recommended that you arrange for someone to remain with you for the first 24 hrs following your surgery.       Thank you for your cooperation.  The Staff of [facility].        Bathing Instructions                                                                 Please shower the evening before and morning of your procedure with    ANTIBACTERIAL SOAP. ( DIAL, etc )  Concentrate on the surgical area   for at least 3 minutes and rinse completely. Dry off as usual.   Do not use any deodorant, powder, body lotions, perfume, after shave or    cologne.

## 2017-05-29 ENCOUNTER — TELEPHONE (OUTPATIENT)
Dept: ORTHOPEDICS | Facility: CLINIC | Age: 57
End: 2017-05-29

## 2017-05-29 ENCOUNTER — HOSPITAL ENCOUNTER (OUTPATIENT)
Facility: OTHER | Age: 57
Discharge: HOME | End: 2017-05-29
Attending: ORTHOPAEDIC SURGERY | Admitting: ORTHOPAEDIC SURGERY
Payer: COMMERCIAL

## 2017-05-29 ENCOUNTER — PATIENT MESSAGE (OUTPATIENT)
Dept: SURGERY | Facility: OTHER | Age: 57
End: 2017-05-29

## 2017-05-29 ENCOUNTER — ANESTHESIA (OUTPATIENT)
Dept: SURGERY | Facility: OTHER | Age: 57
End: 2017-05-29
Payer: COMMERCIAL

## 2017-05-29 ENCOUNTER — SURGERY (OUTPATIENT)
Age: 57
End: 2017-05-29

## 2017-05-29 VITALS
HEIGHT: 61 IN | RESPIRATION RATE: 16 BRPM | SYSTOLIC BLOOD PRESSURE: 114 MMHG | DIASTOLIC BLOOD PRESSURE: 87 MMHG | BODY MASS INDEX: 28.13 KG/M2 | HEART RATE: 79 BPM | TEMPERATURE: 98 F | WEIGHT: 149 LBS | OXYGEN SATURATION: 96 %

## 2017-05-29 DIAGNOSIS — G56.01 RIGHT CARPAL TUNNEL SYNDROME: ICD-10-CM

## 2017-05-29 PROCEDURE — 64721 CARPAL TUNNEL SURGERY: CPT | Mod: RT,,, | Performed by: ORTHOPAEDIC SURGERY

## 2017-05-29 PROCEDURE — 36000706: Performed by: ORTHOPAEDIC SURGERY

## 2017-05-29 PROCEDURE — 37000009 HC ANESTHESIA EA ADD 15 MINS: Performed by: ORTHOPAEDIC SURGERY

## 2017-05-29 PROCEDURE — 36000707: Performed by: ORTHOPAEDIC SURGERY

## 2017-05-29 PROCEDURE — 63600175 PHARM REV CODE 636 W HCPCS: Performed by: NURSE ANESTHETIST, CERTIFIED REGISTERED

## 2017-05-29 PROCEDURE — 63600175 PHARM REV CODE 636 W HCPCS: Performed by: PHYSICIAN ASSISTANT

## 2017-05-29 PROCEDURE — 71000015 HC POSTOP RECOV 1ST HR: Performed by: ORTHOPAEDIC SURGERY

## 2017-05-29 PROCEDURE — 25000003 PHARM REV CODE 250: Performed by: PHYSICIAN ASSISTANT

## 2017-05-29 PROCEDURE — 25000003 PHARM REV CODE 250: Performed by: ANESTHESIOLOGY

## 2017-05-29 PROCEDURE — 25000003 PHARM REV CODE 250: Performed by: ORTHOPAEDIC SURGERY

## 2017-05-29 PROCEDURE — 25000003 PHARM REV CODE 250: Performed by: NURSE ANESTHETIST, CERTIFIED REGISTERED

## 2017-05-29 PROCEDURE — 37000008 HC ANESTHESIA 1ST 15 MINUTES: Performed by: ORTHOPAEDIC SURGERY

## 2017-05-29 RX ORDER — MORPHINE SULFATE 10 MG/ML
3 INJECTION INTRAMUSCULAR; INTRAVENOUS; SUBCUTANEOUS
Status: DISCONTINUED | OUTPATIENT
Start: 2017-05-29 | End: 2017-05-29 | Stop reason: HOSPADM

## 2017-05-29 RX ORDER — MUPIROCIN 20 MG/G
1 OINTMENT TOPICAL
Status: COMPLETED | OUTPATIENT
Start: 2017-05-29 | End: 2017-05-29

## 2017-05-29 RX ORDER — PROMETHAZINE HYDROCHLORIDE 25 MG/1
25 TABLET ORAL EVERY 6 HOURS PRN
Status: DISCONTINUED | OUTPATIENT
Start: 2017-05-29 | End: 2017-05-29 | Stop reason: HOSPADM

## 2017-05-29 RX ORDER — FENTANYL CITRATE 50 UG/ML
INJECTION, SOLUTION INTRAMUSCULAR; INTRAVENOUS
Status: DISCONTINUED | OUTPATIENT
Start: 2017-05-29 | End: 2017-05-29

## 2017-05-29 RX ORDER — SODIUM CHLORIDE, SODIUM LACTATE, POTASSIUM CHLORIDE, CALCIUM CHLORIDE 600; 310; 30; 20 MG/100ML; MG/100ML; MG/100ML; MG/100ML
INJECTION, SOLUTION INTRAVENOUS CONTINUOUS
Status: DISCONTINUED | OUTPATIENT
Start: 2017-05-29 | End: 2017-05-29 | Stop reason: HOSPADM

## 2017-05-29 RX ORDER — LIDOCAINE HCL/PF 100 MG/5ML
SYRINGE (ML) INTRAVENOUS
Status: DISCONTINUED | OUTPATIENT
Start: 2017-05-29 | End: 2017-05-29

## 2017-05-29 RX ORDER — PROPOFOL 10 MG/ML
VIAL (ML) INTRAVENOUS
Status: DISCONTINUED | OUTPATIENT
Start: 2017-05-29 | End: 2017-05-29

## 2017-05-29 RX ORDER — FAMOTIDINE 20 MG/1
20 TABLET, FILM COATED ORAL
Status: COMPLETED | OUTPATIENT
Start: 2017-05-29 | End: 2017-05-29

## 2017-05-29 RX ORDER — KETOROLAC TROMETHAMINE 30 MG/ML
15 INJECTION, SOLUTION INTRAMUSCULAR; INTRAVENOUS EVERY 6 HOURS PRN
Status: DISCONTINUED | OUTPATIENT
Start: 2017-05-29 | End: 2017-05-29 | Stop reason: HOSPADM

## 2017-05-29 RX ORDER — LIDOCAINE HYDROCHLORIDE 10 MG/ML
INJECTION, SOLUTION EPIDURAL; INFILTRATION; INTRACAUDAL; PERINEURAL
Status: DISCONTINUED | OUTPATIENT
Start: 2017-05-29 | End: 2017-05-29 | Stop reason: HOSPADM

## 2017-05-29 RX ORDER — TRAMADOL HYDROCHLORIDE 50 MG/1
50 TABLET ORAL EVERY 6 HOURS PRN
Qty: 61 TABLET | Refills: 1 | Status: SHIPPED | OUTPATIENT
Start: 2017-05-29 | End: 2017-06-08

## 2017-05-29 RX ORDER — CEFAZOLIN SODIUM 2 G/50ML
2 SOLUTION INTRAVENOUS
Status: COMPLETED | OUTPATIENT
Start: 2017-05-29 | End: 2017-05-29

## 2017-05-29 RX ORDER — BUPIVACAINE HYDROCHLORIDE 2.5 MG/ML
INJECTION, SOLUTION EPIDURAL; INFILTRATION; INTRACAUDAL
Status: DISCONTINUED | OUTPATIENT
Start: 2017-05-29 | End: 2017-05-29 | Stop reason: HOSPADM

## 2017-05-29 RX ORDER — MIDAZOLAM HYDROCHLORIDE 1 MG/ML
INJECTION INTRAMUSCULAR; INTRAVENOUS
Status: DISCONTINUED | OUTPATIENT
Start: 2017-05-29 | End: 2017-05-29

## 2017-05-29 RX ORDER — ACETAMINOPHEN 325 MG/1
650 TABLET ORAL EVERY 4 HOURS PRN
Status: DISCONTINUED | OUTPATIENT
Start: 2017-05-29 | End: 2017-05-29 | Stop reason: HOSPADM

## 2017-05-29 RX ADMIN — MIDAZOLAM HYDROCHLORIDE 2 MG: 1 INJECTION, SOLUTION INTRAMUSCULAR; INTRAVENOUS at 07:05

## 2017-05-29 RX ADMIN — LIDOCAINE HYDROCHLORIDE 50 MG: 20 INJECTION, SOLUTION INTRAVENOUS at 07:05

## 2017-05-29 RX ADMIN — FENTANYL CITRATE 50 MCG: 50 INJECTION, SOLUTION INTRAMUSCULAR; INTRAVENOUS at 07:05

## 2017-05-29 RX ADMIN — BUPIVACAINE HYDROCHLORIDE 10 ML: 2.5 INJECTION, SOLUTION EPIDURAL; INFILTRATION; INTRACAUDAL; PERINEURAL at 07:05

## 2017-05-29 RX ADMIN — SODIUM CHLORIDE, SODIUM LACTATE, POTASSIUM CHLORIDE, AND CALCIUM CHLORIDE: 600; 310; 30; 20 INJECTION, SOLUTION INTRAVENOUS at 06:05

## 2017-05-29 RX ADMIN — PROPOFOL 50 MG: 10 INJECTION, EMULSION INTRAVENOUS at 07:05

## 2017-05-29 RX ADMIN — LIDOCAINE HYDROCHLORIDE 10 ML: 10 INJECTION, SOLUTION EPIDURAL; INFILTRATION; INTRACAUDAL; PERINEURAL at 07:05

## 2017-05-29 RX ADMIN — CEFAZOLIN SODIUM 2 G: 2 SOLUTION INTRAVENOUS at 07:05

## 2017-05-29 RX ADMIN — MUPIROCIN 1 G: 20 OINTMENT TOPICAL at 06:05

## 2017-05-29 RX ADMIN — FAMOTIDINE 20 MG: 20 TABLET, FILM COATED ORAL at 06:05

## 2017-05-29 NOTE — DISCHARGE INSTRUCTIONS
Discharge Instructions for Carpal Tunnel Release  You had a carpal tunnel release procedure to help relieve the symptoms of carpal tunnel syndrome. In carpal tunnel syndrome, a nerve in the wrist is compressed and irritated. This causes numbness and pain in the fingers and hand. Carpal tunnel release relieves the compression of the nerve. Here are instructions that will help you care for your arm and wrist when you are at home.  Home care  · Avoid gripping objects tightly or lifting with your affected arm.  · Wear your bandage, splint, or cast as directed by your doctor.  · Always keep the dressing, splint, or cast dry and clean.  · When showering, cover your hand and  wrist with plastic and use tape or rubberbands to keep the dressing, splint, or cast dry. Shower as necessary.    · Use an ice pack or bag of frozen peas -- or something similar -- wrapped in a thin towel on your wrist to reduce swelling for the first 48 hours. Leave the ice pack on for 20 minutes; then take it off for 20 minutes. Repeat as needed.  · Keep your arm elevated above your heart for 24 to 48 hours after surgery.  · Do the exercises you learned in the hospital, or as instructed by your doctor.  · Take pain medicine as directed.  · Dont drive until your doctor says its OK. Never drive while you are taking opioid pain medicine.  · Ask your doctor when you can return to work. If your job requires heavy lifting, you may not be able to begin working again for several weeks.  Follow-up care  Make a follow-up appointment as directed by your doctor.     When to seek medical care  Call 911 right away if you have any of the following:  · Chest pain  · Shortness of breath  Otherwise, call your doctor immediately if you have any of the following:  · A splint, cast, or dressing that has gotten wet  · Increased bleeding or drainage from the incision (cut)  · Opening of the incision  · Fever above 100.4°F (38.9°C) taken by mouth, or shaking  chills  · Any new numbness in the fingers or thumb  · Blue hand or fingers  · Increased pain with or without activity  · Increased redness, tenderness, or swelling of the incision   Date Last Reviewed: 11/15/2015  © 6804-0425 "[employer]. [address] [place] [address]. All rights reserved. This information is not intended as a substitute for professional medical care. Always follow your healthcare professional's instructions.      Anesthesia: Monitored Anesthesia Care (MAC)    Anesthesia Safety  · Have an adult family member or friend drive you home after the procedure.  · For the first 24 hours after your surgery:  ¨ Do not drive or use heavy equipment.  ¨ Do not make important decisions or sign documents.  ¨ Avoid alcohol.  ¨ Have someone stay with you, if possible. They can watch for problems and help keep you safe.

## 2017-05-29 NOTE — PLAN OF CARE
Joyce Peterson has met all discharge criteria from Phase II. Vital Signs are stable, ambulating  without difficulty. Discharge instructions given, patient verbalized understanding. Discharged from facility via wheelchair in stable condition.

## 2017-05-29 NOTE — OP NOTE
DATE OF PROCEDURE: 5/29/17  SERVICE: Orthopedics.   ATTENDING SURGEON: Staci Yarbrough M.D.   ASSISTANT SURGEON: none  PREOPERATIVE DIAGNOSIS: right carpal tunnel syndrome.   POSTOPERATIVE DIAGNOSIS: right carpal tunnel syndrome.   PROCEDURE: right carpal tunnel release.   ANESTHESIA: Local placed by surgeon and MAC.   FLUIDS: Lactated Ringers.   BLOOD LOSS: None. No blood was given.   TOURNIQUET TIME: 12 minutes.   PACKS AND DRAINS: None.   IMPLANTS: None.   SPECIMENS: None.   COMPLICATIONS: None.   INDICATIONS FOR PROCEDURE: Ms. Peterson is a 56-year-old female who had numbness   and tingling into her right hand. She has failed conservative treatment, EMG   was positive for carpal tunnel syndrome. Therefore, operative intervention was   deemed necessary. Risks and benefits were explained to the patient in clinic   since performed in clinic.   PROCEDURE IN DETAIL: After correct site was marked with the patient's   participation in the holding area, the patient was brought to the Operating   Room, placed in supine position, underwent MAC anesthesia. A well-padded   nonsterile tourniquet was placed on the right arm. A time-out was called for   correct site, procedure and patient to be indicated. Under sterile conditions,   an injection of lidocaine 1% was injected into the carpal tunnel space. The arm   was prepped and draped in normal sterile fashion. The incision was marked out   using Aguayo cardinal lines. The arm was exsanguinated using Esmarch.   Tourniquet was insufflated to 250 mmHg and that is where it remained for a total   of 10 minutes. The incision was made down to the palmar fascia. The palmar   fascia was sharply incised. The transverse ligament was identified. It was   very thick in nature. It was sharply incised. Median nerve was identified. A   Mosquito hemostat was passed from the undersurface of the transverse ligament   proximally and distally to free it from the median nerve. Kiran  scissors   were utilized to cut the transverse ligament proximally and distally. Once that   was completely released, a Mosquito hemostat was passed again to confirm a   complete release. Once that was performed, the area was irrigated with copious   amounts of normal saline. Nylon closed the skin. Sterile dressing was applied.   Tourniquet was deflated. Brisk capillary refill ensued. The patient was   transported to the Recovery Room in stable condition.   POSTOPERATIVE PLAN FOR THIS PATIENT: She is to keep her dressing clean, dry and   intact. We will see her back in 2 weeks for stitch removal.

## 2017-05-29 NOTE — ANESTHESIA POSTPROCEDURE EVALUATION
"Anesthesia Post Evaluation    Patient: Joyce Peterson    Procedure(s) Performed: Procedure(s) (LRB):  RELEASE-CARPAL TUNNEL / Right (Right)    Final Anesthesia Type: general  Patient location during evaluation: [facility]  Patient participation: Yes- Able to Participate  Level of consciousness: awake and alert  Post-procedure vital signs: reviewed and stable  Pain management: adequate  Airway patency: patent  PONV status at discharge: No PONV  Anesthetic complications: no      Cardiovascular status: blood pressure returned to baseline  Respiratory status: unassisted, spontaneous ventilation and room air  Hydration status: euvolemic  Follow-up not needed.        Visit Vitals  /88 (BP Location: Right arm, Patient Position: Lying, BP Method: Automatic)   Pulse 81   Temp 36.9 °C (98.5 °F) (Oral)   Resp 16   Ht 5' 1" (1.549 m)   Wt 67.6 kg (149 lb)   SpO2 96%   Breastfeeding? No   BMI 28.15 kg/m²       Pain/Isacc Score: No Data Recorded      "

## 2017-05-29 NOTE — BRIEF OP NOTE
[facility]  Brief Operative Note     SUMMARY     Surgery Date: 5/29/2017     Surgeon(s) and Role:     * Staci Yarbrough MD - Primary    Assisting Surgeon: None    Pre-op Diagnosis:  Carpal tunnel syndrome, right [G56.01]    Post-op Diagnosis:  Post-Op Diagnosis Codes:     * Carpal tunnel syndrome, right [G56.01]    Procedure(s) (LRB):  RELEASE-CARPAL TUNNEL / Right (Right)    Anesthesia: Local MAC    Description of the findings of the procedure: see op note    Findings/Key Components: see op note     Estimated Blood Loss: 2cc          Specimens:   Specimen (12h ago through future)    None          Discharge Note    SUMMARY     Admit Date: 5/29/2017    Discharge Date and Time:  05/29/2017 8:03 AM    Hospital Course    On the day of surgery, Joyce Peterson arrived to the day of surgery center. The patient was identified in the pre-operative area and the operative site was marked. All consents were verified. The patient was taken to the operative suite and underwent a right CTR without complication. The patient tolerated the procedure well and was then taken to the PACU and monitored post-operatively. The patient's pain was controlled with oral medications and the decision was made to discharge the patient home with close follow up.     Follow up appointment scheduled for 2 weeks with Maci  Weight Bearing Status: As tolerated   Prescriptions:   Diet: Regular         Final Diagnosis: Post-Op Diagnosis Codes:     * Carpal tunnel syndrome, right [G56.01]    Disposition: Home or Self Care    Follow Up/Patient Instructions:     Medications:  Reconciled Home Medications:   Current Discharge Medication List      START taking these medications    Details   !! tramadol (ULTRAM) 50 mg tablet Take 1 tablet (50 mg total) by mouth every 6 (six) hours as needed for Pain.  Qty: 61 tablet, Refills: 1       !! - Potential duplicate medications found. Please discuss with provider.      CONTINUE these  medications which have NOT CHANGED    Details   budesonide-formoterol 160-4.5 mcg (SYMBICORT) 160-4.5 mcg/actuation HFAA Inhale 2 puffs into the lungs every 4 to 6 hours as needed.  Qty: 1 Inhaler, Refills: 12    Associated Diagnoses: Chronic asthma, mild intermittent, uncomplicated      calcium citrate-vitamin D (CITRACAL + D) 315-200 mg-unit per tablet Take 1 tablet by mouth 2 (two) times daily.       CRESTOR 20 mg tablet Take 1 tablet (20 mg total) by mouth once daily.  Qty: 90 tablet, Refills: 3    Associated Diagnoses: Coronary artery disease involving native coronary artery of native heart without angina pectoris      CYCLOSPORINE (RESTASIS OPHT) Inject 0.05 % into the eye. 1 Dropperette Both eyes Twice a day .  dispense one month supply/sixty-four vials/ two trays      INV PROPULSID 10 MG Take 20 mg by mouth 4 (four) times daily as directed by physician.  For investigational use only  Qty: 480 each, Refills: 2    Associated Diagnoses: Gastroparesis      leflunomide (ARAVA) 20 MG Tab Take 1 tablet (20 mg total) by mouth once daily.  Qty: 30 tablet, Refills: 2    Associated Diagnoses: Rheumatoid arthritis, involving unspecified site, unspecified rheumatoid factor presence      montelukast (SINGULAIR) 10 mg tablet TAKE 1 TABLET AT BEDTIME  Qty: 30 tablet, Refills: 1    Associated Diagnoses: Perennial allergic rhinitis      omeprazole-sodium bicarbonate (ZEGERID) 40-1.1 mg-gram per capsule Take 1 capsule by mouth every morning.  Qty: 30 capsule, Refills: 11      POTASSIUM ORAL Take by mouth once daily.      predniSONE (DELTASONE) 5 MG tablet Take 1 tablet (5 mg total) by mouth once daily.  Qty: 30 tablet, Refills: 3    Associated Diagnoses: Rheumatoid arthritis, involving unspecified site, unspecified rheumatoid factor presence      PREMARIN 0.625 mg tablet Take 0.625 mg by mouth once daily.  Refills: 11      progesterone (PROMETRIUM) 100 MG capsule Take 100 mg by mouth every morning. 1 Capsule Oral Every day,  morning      sucralfate (CARAFATE) 1 gram tablet Take 1 tablet (1 g total) by mouth 4 (four) times daily.  Qty: 120 tablet, Refills: 6    Associated Diagnoses: Gastroparesis      tofacitinib (XELJANZ XR) 11 mg Tb24 Take 1 tablet by mouth once daily.  Qty: 30 tablet, Refills: 1    Associated Diagnoses: Rheumatoid arthritis, involving unspecified site, unspecified rheumatoid factor presence      VESICARE 10 mg tablet Take 1 tablet by mouth every morning.       vitamin E 1000 UNIT capsule Take 1,000 Units by mouth once daily.      albuterol 90 mcg/actuation inhaler Inhale 2 puffs into the lungs every 6 (six) hours as needed for Wheezing.  Qty: 18 g, Refills: 11    Comments: Patient unable to use proair, please provide either proventil or ventolin (whichever is on the formulary)      aspirin 81 mg Tab Take 81 mg by mouth every morning.       azelastine (ASTELIN) 137 mcg nasal spray 1 spray (137 mcg total) by Nasal route 2 (two) times daily.  Qty: 30 mL, Refills: 11    Associated Diagnoses: Nasal congestion      fish oil-omega-3 fatty acids 300-1,000 mg capsule Take 1,400 g by mouth once daily.      flaxseed oil 1,000 mg Cap Take by mouth once daily.      GUAIFENESIN (MUCINEX ORAL) Take 400 mg by mouth every 4 (four) hours as needed.       naproxen (NAPROSYN) 500 MG tablet Take 1 tablet (500 mg total) by mouth 2 (two) times daily.  Qty: 60 tablet, Refills: 1      omeprazole (PRILOSEC) 40 MG capsule TAKE ONE CAPSULE BY MOUTH EVERY MORNING  Qty: 30 capsule, Refills: 6      rizatriptan (MAXALT) 10 MG tablet Take 10 mg by mouth as needed for Migraine.       !! tramadol (ULTRAM) 50 mg tablet TAKE 1 TABLET DAILY AS NEEDED FOR PAIN  Qty: 30 tablet, Refills: 0    Comments: This request is for a new prescription for a controlled substance as required by Federal/State law.       !! - Potential duplicate medications found. Please discuss with provider.          Discharge Procedure Orders  Diet general     Call MD for:  temperature  >100.4     Call MD for:  persistent nausea and vomiting     Call MD for:  severe uncontrolled pain     Call MD for:  difficulty breathing, headache or visual disturbances     Call MD for:  redness, tenderness, or signs of infection (pain, swelling, redness, odor or green/yellow discharge around incision site)     Call MD for:  hives     Call MD for:  persistent dizziness or light-headedness     Call MD for:  extreme fatigue     Shower on day dressing removed (No bath)     Leave dressing on - Keep it clean, dry, and intact until clinic visit     Call MD for:  temperature >100.4     Call MD for:  persistent nausea and vomiting     Call MD for:  severe uncontrolled pain     Call MD for:  difficulty breathing, headache or visual disturbances     Call MD for:  redness, tenderness, or signs of infection (pain, swelling, redness, odor or green/yellow discharge around incision site)     Call MD for:  hives     Call MD for:  persistent dizziness or light-headedness     Call MD for:  extreme fatigue     Activity as tolerated     Ice to affected area     No driving, operating heavy equipment or signing legal documents while taking pain medication.     Leave dressing on - Keep it clean, dry, and intact until clinic visit       Follow-up Information     Emily Lux PA-C In 2 weeks.    Specialties:  Hand Surgery, Orthopedic Surgery  Why:  For suture removal, For wound re-check  Contact information:  [address] [facility]  [address], [address]  [facility] [address]  [phone]

## 2017-05-29 NOTE — TRANSFER OF CARE
"Anesthesia Transfer of Care Note    Patient: Joyce Peterson    Procedure(s) Performed: Procedure(s) (LRB):  RELEASE-CARPAL TUNNEL / Right (Right)    Patient location: [facility]    Anesthesia Type: general    Transport from OR: Transported from OR on 2-3 L/min O2 by NC with adequate spontaneous ventilation    Post pain: adequate analgesia    Post assessment: no apparent anesthetic complications    Post vital signs: stable    Level of consciousness: awake, alert and oriented    Nausea/Vomiting: no nausea/vomiting    Complications: none    Transfer of care protocol was followed      Last vitals:   Visit Vitals  /88 (BP Location: Right arm, Patient Position: Lying, BP Method: Automatic)   Pulse 81   Temp 36.9 °C (98.5 °F) (Oral)   Resp 16   Ht 5' 1" (1.549 m)   Wt 67.6 kg (149 lb)   SpO2 96%   Breastfeeding? No   BMI 28.15 kg/m²     "

## 2017-05-29 NOTE — TELEPHONE ENCOUNTER
"Spoke w/pt about [facility] message, notified okay to loosen ACE wrap. Fingers will look like "sausages" for the next couple weeks per Arlen. Pt verbalized understanding.   "

## 2017-05-29 NOTE — H&P (VIEW-ONLY)
This is a preoperative H and P as well as an office visit.    CHIEF COMPLAINT:  Right hand and elbow pain.    HISTORY OF PRESENT ILLNESS:  Ms. Peterson is a 56-year-old right-hand dominant   female presenting today for evaluation of her right upper extremity pain.  She   reports that she went to therapy, but is still having pain at the elbow.  She is   interested in an injection for her tennis elbow.  She does have RA.  She is   concerned that this is not getting better.  She still has numbness and tingling   of her fingers, mostly in the morning and in the evening time.  She reports   during the day, it is usually okay, but occasionally, it does go numb,   especially positional.  She feels that this is also painful.  She does have pain   that radiates up the volar aspect of her forearm.  She reports most of the   numbness is in the middle and ring finger, but occasionally does have small and   thumb numbness.  She denies any recent trauma.  She is on prednisone.  She   denies nausea, vomiting, fever or chills today.    Past Medical History:   Diagnosis Date    Acid reflux     Allergy     Anemia     Asthma     Coronary artery disease     Degenerative disc disease     Dry eyes     Dry mouth     Gastroparesis     Hyperlipidemia     Lateral meniscus derangement 4/6/2016    Osteoarthritis     Osteoporosis     Rheumatoid arthritis     Umbilical hernia 8/13/2015       Past Surgical History:   Procedure Laterality Date    CHOLECYSTECTOMY  2004    COLONOSCOPY      10/11    TONSILLECTOMY      TUBAL LIGATION  2003    UPPER GASTROINTESTINAL ENDOSCOPY      10/11    uterine ablation  2003       Social History     Social History    Marital status:      Spouse name: N/A    Number of children: N/A    Years of education: N/A     Occupational History    Not on file.     Social History Main Topics    Smoking status: Never Smoker    Smokeless tobacco: Never Used    Alcohol use Yes      Comment:  occasionally    Drug use: No    Sexual activity: Yes     Partners: Male     Birth control/ protection: Surgical     Other Topics Concern    Not on file     Social History Narrative       Current Outpatient Prescriptions on File Prior to Visit   Medication Sig Dispense Refill    albuterol 90 mcg/actuation inhaler Inhale 2 puffs into the lungs every 6 (six) hours as needed for Wheezing. 18 g 11    aspirin 81 mg Tab Take 81 mg by mouth every morning.       azelastine (ASTELIN) 137 mcg nasal spray 1 spray (137 mcg total) by Nasal route 2 (two) times daily. (Patient taking differently: 1 spray by Nasal route 2 (two) times daily as needed. ) 30 mL 11    azithromycin (ZITHROMAX Z-DEVON) 250 MG tablet 2 tablets for the first day then one tablet daily until ZPAK complete. 1 tablet 0    azithromycin (ZITHROMAX Z-DEVON) 250 MG tablet 2 tablets for the first day then one tablet daily until ZPAK complete. 1 tablet 0    budesonide-formoterol 160-4.5 mcg (SYMBICORT) 160-4.5 mcg/actuation HFAA Inhale 2 puffs into the lungs every 4 to 6 hours as needed. 1 Inhaler 12    calcium citrate-vitamin D (CITRACAL + D) 315-200 mg-unit per tablet Take 1 tablet by mouth 2 (two) times daily.       CRESTOR 20 mg tablet Take 1 tablet (20 mg total) by mouth once daily. 90 tablet 3    CYCLOSPORINE (RESTASIS OPHT) Inject 0.05 % into the eye. 1 Dropperette Both eyes Twice a day .  dispense one month supply/sixty-four vials/ two trays      fish oil-omega-3 fatty acids 300-1,000 mg capsule Take 1,400 g by mouth once daily.      flaxseed oil 1,000 mg Cap Take by mouth once daily.      GUAIFENESIN (MUCINEX ORAL) Take 400 mg by mouth every 4 (four) hours as needed.       INV PROPULSID 10 MG Take 20 mg by mouth 4 (four) times daily as directed by physician.  For investigational use only 480 each 2    leflunomide (ARAVA) 20 MG Tab Take 1 tablet (20 mg total) by mouth once daily. 30 tablet 2    montelukast (SINGULAIR) 10 mg tablet TAKE 1 TABLET  AT BEDTIME 30 tablet 1    naproxen (NAPROSYN) 500 MG tablet TAKE 1 TABLET TWICE A DAY 60 tablet 1    omeprazole (PRILOSEC) 40 MG capsule TAKE ONE CAPSULE BY MOUTH EVERY MORNING 30 capsule 6    omeprazole-sodium bicarbonate (ZEGERID) 40-1.1 mg-gram per capsule Take 1 capsule by mouth every morning. 30 capsule 11    POTASSIUM ORAL Take by mouth once daily.      predniSONE (DELTASONE) 5 MG tablet TAKE 1 & 1/2 TABS EVERY DAY 45 tablet 1    progesterone (PROMETRIUM) 100 MG capsule Take 100 mg by mouth every morning. 1 Capsule Oral Every day, morning      promethazine (PHENERGAN) 12.5 MG Tab Take 1 tablet (12.5 mg total) by mouth 4 (four) times daily. 30 tablet 0    rizatriptan (MAXALT) 10 MG tablet Take 10 mg by mouth as needed for Migraine.       sucralfate (CARAFATE) 1 gram tablet Take 1 tablet (1 g total) by mouth 4 (four) times daily. 120 tablet 6    tofacitinib (XELJANZ XR) 11 mg Tb24 Take 1 tablet by mouth once daily. 30 tablet 1    VESICARE 10 mg tablet Take 1 tablet by mouth every morning.       vitamin E 1000 UNIT capsule Take 1,000 Units by mouth once daily.       No current facility-administered medications on file prior to visit.        Review of patient's allergies indicates:   Allergen Reactions    Actemra [tocilizumab] Other (See Comments)     Severe dizziness    Codeine Nausea And Vomiting    Gold au 198 Hives and Rash    Hydroxychloroquine Other (See Comments)     Can't remember the reaction      Iodinated contrast media - oral and iv dye Other (See Comments)     Other reaction(s): BURNING ALL OVER    Iodine Other (See Comments)     Other reaction(s): BURNING ALL OVER - Iodine dye - Not topical    Sulfa (sulfonamide antibiotics) Other (See Comments)     Can't remember the reaction    Zofran [ondansetron hcl (pf)] Nausea And Vomiting     Pt reports that last time she received zofran she started vomiting again    Methotrexate analogues Nausea Only    Pneumovax 23 [pneumococcal 23-argenis  "ps vaccine] Other (See Comments)     "sick"       Review of Systems:  Constitutional: no fever or chills  ENT: no nasal congestion or sore throat  Respiratory: no cough or shortness of breath  Cardiovascular: no chest pain or palpitations  Gastrointestinal: no nausea or vomiting  Genitourinary: no hematuria or dysuria  Integument/Breast: no rash or pruritis  Hematologic/Lymphatic: no easy bruising or lymphadenopathy  Musculoskeletal: see HPI  Neurological: no seizures or tremors  Behavioral/Psych: no auditory or visual hallucinations      PHYSICAL EXAM    Vitals:    05/09/17 1012   BP: 105/70   Pulse: 102   Resp: 18   Weight: 69.9 kg (154 lb)   Height: 5' 3.6" (1.615 m)   PainSc: 0-No pain   PainLoc: Hand       GENERAL:  Well-developed, well-nourished, in no acute distress.  CARDIOVASCULAR:  Regular rate and rhythm.  LUNGS:  Respirations are equal and unlabored.  BEHAVIORAL AND PSYCHIATRIC:  Mood and affect are appropriate.  NEUROLOGIC:  No tremor.  HEENT:  Normocephalic and atraumatic.  MUSCULOSKELETAL:  Upper Extremity Exam:  She is distally neurovascularly intact.    AIN and PIN nerves are intact.  Sensation over the radial, ulnar and median   nerves are equivocal.  She does have a positive Tinel's and positive Durkan's.    She does have tenderness over the lateral epicondyle.  Negative Tinel's at the   elbow.  She does have a predominant ulnar styloid present.  Some tenderness over   the dorsal aspect of the wrist.  She does have a full composite fist.  She does   have a shoulder deformity at the thumb CMC.    ELECTROMYELOGRAM:  On EMG, moderate right carpal tunnel.    ASSESSMENT:  1.  Right carpal tunnel.  2.  Lateral epicondylitis.  3.  Cubital tunnel.    PLAN:  I discussed with Ms. Peterson at this time, we can proceed with surgical   intervention to fix her lateral epicondylitis at the same time as the carpal   tunnel and cubital tunnel; however, would require 6 weeks of casting.  She   reports that because " of her RA, she does not want any type of casting.  We also   discussed elective casting.  She does not want that.  She would like to proceed   with therapy for the elbow, but just wants her carpal tunnel fixed.  We will   proceed with carpal tunnel release and see how she does from that.  As long as   she is doing well, we do not need to proceed with surgical intervention.  We   will have her see us in the [facility] for her lateral epicondylitis   postoperatively.  The patient wishes to proceed as planned.    DICTATED BY:  ANNETTE Colunga  dd: 05/09/2017 12:05:30 (CDT)  td: 05/10/2017 10:06:27 (CDT)  Doc ID   #[id number]  Job ID #[id number]    CC:

## 2017-05-31 ENCOUNTER — DOCUMENTATION ONLY (OUTPATIENT)
Dept: REHABILITATION | Facility: HOSPITAL | Age: 57
End: 2017-05-31

## 2017-05-31 NOTE — PROGRESS NOTES
OT note:     Ms. Peterson was scheduled for a re-eval on 5/19/217.  She did not show up for this appointment.  She was spoken too prior to scheduled visit and instructed that the physician was requesting OT for  US to elbow.  At this time she has not called back to reschedule.  NO charges were posted this day.

## 2017-06-09 ENCOUNTER — TELEPHONE (OUTPATIENT)
Dept: ORTHOPEDICS | Facility: CLINIC | Age: 57
End: 2017-06-09

## 2017-06-12 ENCOUNTER — OFFICE VISIT (OUTPATIENT)
Dept: ORTHOPEDICS | Facility: CLINIC | Age: 57
End: 2017-06-12
Payer: COMMERCIAL

## 2017-06-12 VITALS
BODY MASS INDEX: 28.13 KG/M2 | HEART RATE: 97 BPM | WEIGHT: 149 LBS | HEIGHT: 61 IN | SYSTOLIC BLOOD PRESSURE: 115 MMHG | DIASTOLIC BLOOD PRESSURE: 75 MMHG

## 2017-06-12 DIAGNOSIS — G56.01 RIGHT CARPAL TUNNEL SYNDROME: Primary | ICD-10-CM

## 2017-06-12 DIAGNOSIS — Z47.89 ORTHOPEDIC AFTERCARE: ICD-10-CM

## 2017-06-12 PROCEDURE — 99024 POSTOP FOLLOW-UP VISIT: CPT | Mod: S$GLB,,, | Performed by: PHYSICIAN ASSISTANT

## 2017-06-12 PROCEDURE — 99999 PR PBB SHADOW E&M-EST. PATIENT-LVL IV: CPT | Mod: PBBFAC,,, | Performed by: PHYSICIAN ASSISTANT

## 2017-06-12 NOTE — PROGRESS NOTES
"Ms. Peterson is here today for a post-operative visit.  She is 14 days status post Right Carpal Tunnel Release by Dr. Yarbrough on 5/29/17. She reports that she is doing well.  She denies any current pain.  She reports that she is on prednisone.   She admits to improvement in numbness/tingling of fingers, but it has not yet resolved.  She is not taking pain medication.  She denies fever, chills, and sweats since the time of the surgery.     Physical exam:    Vitals:    06/12/17 1024 06/12/17 1026   BP: 115/75    Pulse: 97    Weight: 67.6 kg (149 lb)    Height: 5' 1" (1.549 m)    PainSc: 0-No pain 0-No pain   PainLoc: Wrist      Vital signs are stable, patient is afebrile.  Patient is well dressed and well groomed, no acute distress.  Alert and oriented to person, place, and time.  Post op dressing taken down.  Incision is clean, dry, and intact; scab noted at incision.  There is no erythema or exudate.  There is no sign of any infection. She is NVI. Sutures removed without difficulty.  Steri-strips placed over incision.  Good ROM of fingers, thumb ROM still limited but unchanged since surgery.    Assessment: 14 days status post Right Carpal Tunnel Release    Plan:  Joyce was seen today for post-op evaluation.    Diagnoses and all orders for this visit:    Right carpal tunnel syndrome    Orthopedic aftercare        - PO instruction reviewed and provided to patient  - Discussed incision care and use of steri-strips  - She is interested in starting OT as previously ordered, will message therapist to get it scheduled  - Follow up in 4 weeks as needed  - Call with questions or concerns    "

## 2017-06-19 ENCOUNTER — TELEPHONE (OUTPATIENT)
Dept: ORTHOPEDICS | Facility: CLINIC | Age: 57
End: 2017-06-19

## 2017-06-19 ENCOUNTER — PATIENT MESSAGE (OUTPATIENT)
Dept: ORTHOPEDICS | Facility: CLINIC | Age: 57
End: 2017-06-19

## 2017-06-19 NOTE — TELEPHONE ENCOUNTER
Spoke w/pt, appt scheduled for tomorrow for wound check. Pt verbalized understanding to time/location of appt.

## 2017-06-20 ENCOUNTER — OFFICE VISIT (OUTPATIENT)
Dept: ORTHOPEDICS | Facility: CLINIC | Age: 57
End: 2017-06-20
Payer: COMMERCIAL

## 2017-06-20 ENCOUNTER — HOSPITAL ENCOUNTER (OUTPATIENT)
Dept: RADIOLOGY | Facility: HOSPITAL | Age: 57
Discharge: HOME | End: 2017-06-20
Attending: SPECIALIST
Payer: COMMERCIAL

## 2017-06-20 VITALS
DIASTOLIC BLOOD PRESSURE: 74 MMHG | TEMPERATURE: 98 F | SYSTOLIC BLOOD PRESSURE: 126 MMHG | HEIGHT: 61 IN | HEART RATE: 72 BPM | WEIGHT: 149 LBS | BODY MASS INDEX: 28.13 KG/M2

## 2017-06-20 VITALS — BODY MASS INDEX: 28.13 KG/M2 | HEIGHT: 61 IN | WEIGHT: 149 LBS

## 2017-06-20 DIAGNOSIS — Z47.89 ORTHOPEDIC AFTERCARE: ICD-10-CM

## 2017-06-20 DIAGNOSIS — G56.21 CUBITAL TUNNEL SYNDROME ON RIGHT: ICD-10-CM

## 2017-06-20 DIAGNOSIS — J30.89 PERENNIAL ALLERGIC RHINITIS: ICD-10-CM

## 2017-06-20 DIAGNOSIS — M77.11 LATERAL EPICONDYLITIS OF RIGHT ELBOW: ICD-10-CM

## 2017-06-20 DIAGNOSIS — Z12.31 SCREENING MAMMOGRAM, ENCOUNTER FOR: ICD-10-CM

## 2017-06-20 DIAGNOSIS — G56.01 RIGHT CARPAL TUNNEL SYNDROME: Primary | ICD-10-CM

## 2017-06-20 PROCEDURE — 99999 PR PBB SHADOW E&M-EST. PATIENT-LVL V: CPT | Mod: PBBFAC,,, | Performed by: PHYSICIAN ASSISTANT

## 2017-06-20 PROCEDURE — 77067 SCR MAMMO BI INCL CAD: CPT | Mod: TC

## 2017-06-20 PROCEDURE — 77063 BREAST TOMOSYNTHESIS BI: CPT | Mod: 26,,, | Performed by: RADIOLOGY

## 2017-06-20 PROCEDURE — 99024 POSTOP FOLLOW-UP VISIT: CPT | Mod: S$GLB,,, | Performed by: PHYSICIAN ASSISTANT

## 2017-06-20 PROCEDURE — 77067 SCR MAMMO BI INCL CAD: CPT | Mod: 26,,, | Performed by: RADIOLOGY

## 2017-06-20 RX ORDER — MONTELUKAST SODIUM 10 MG/1
TABLET ORAL
Qty: 30 TABLET | Refills: 1 | OUTPATIENT
Start: 2017-06-20

## 2017-06-26 ENCOUNTER — PATIENT MESSAGE (OUTPATIENT)
Dept: ENDOSCOPY | Facility: HOSPITAL | Age: 57
End: 2017-06-26

## 2017-06-27 DIAGNOSIS — J30.89 PERENNIAL ALLERGIC RHINITIS: ICD-10-CM

## 2017-06-28 RX ORDER — MONTELUKAST SODIUM 10 MG/1
TABLET ORAL
Qty: 30 TABLET | Refills: 1 | OUTPATIENT
Start: 2017-06-28

## 2017-06-28 NOTE — TELEPHONE ENCOUNTER
Spoke to patient to clarify instructions for colonoscopy.  Informed patient, that Dr Moore recommended holding off of taking Dulcolax if she doesn't want to take it.  She is ok with drinking the Golytely prep she has-as long as she doesn't have to take the Dulcolax.  Stated understanding.  No further questions at this time.

## 2017-06-29 ENCOUNTER — SURGERY (OUTPATIENT)
Age: 57
End: 2017-06-29

## 2017-06-29 ENCOUNTER — ANESTHESIA (OUTPATIENT)
Dept: ENDOSCOPY | Facility: HOSPITAL | Age: 57
End: 2017-06-29
Payer: COMMERCIAL

## 2017-06-29 ENCOUNTER — HOSPITAL ENCOUNTER (OUTPATIENT)
Facility: HOSPITAL | Age: 57
Discharge: HOME | End: 2017-06-29
Attending: INTERNAL MEDICINE | Admitting: INTERNAL MEDICINE
Payer: COMMERCIAL

## 2017-06-29 ENCOUNTER — ANESTHESIA EVENT (OUTPATIENT)
Dept: ENDOSCOPY | Facility: HOSPITAL | Age: 57
End: 2017-06-29
Payer: COMMERCIAL

## 2017-06-29 VITALS
SYSTOLIC BLOOD PRESSURE: 107 MMHG | RESPIRATION RATE: 12 BRPM | TEMPERATURE: 99 F | BODY MASS INDEX: 28.13 KG/M2 | HEART RATE: 83 BPM | HEIGHT: 61 IN | OXYGEN SATURATION: 98 % | RESPIRATION RATE: 19 BRPM | DIASTOLIC BLOOD PRESSURE: 57 MMHG | WEIGHT: 149 LBS

## 2017-06-29 DIAGNOSIS — D50.0 IRON DEFICIENCY ANEMIA DUE TO CHRONIC BLOOD LOSS: Primary | ICD-10-CM

## 2017-06-29 DIAGNOSIS — D62 ANEMIA ASSOCIATED WITH ACUTE BLOOD LOSS: ICD-10-CM

## 2017-06-29 DIAGNOSIS — B37.81 ESOPHAGEAL CANDIDIASIS: Primary | ICD-10-CM

## 2017-06-29 PROCEDURE — D9220A PRA ANESTHESIA: Mod: CRNA,,, | Performed by: NURSE ANESTHETIST, CERTIFIED REGISTERED

## 2017-06-29 PROCEDURE — 25000003 PHARM REV CODE 250: Performed by: NURSE ANESTHETIST, CERTIFIED REGISTERED

## 2017-06-29 PROCEDURE — 63600175 PHARM REV CODE 636 W HCPCS: Performed by: NURSE ANESTHETIST, CERTIFIED REGISTERED

## 2017-06-29 PROCEDURE — 43235 EGD DIAGNOSTIC BRUSH WASH: CPT | Performed by: INTERNAL MEDICINE

## 2017-06-29 PROCEDURE — D9220A PRA ANESTHESIA: Mod: ANES,,, | Performed by: ANESTHESIOLOGY

## 2017-06-29 PROCEDURE — 37000009 HC ANESTHESIA EA ADD 15 MINS: Performed by: INTERNAL MEDICINE

## 2017-06-29 PROCEDURE — 45378 DIAGNOSTIC COLONOSCOPY: CPT | Performed by: INTERNAL MEDICINE

## 2017-06-29 PROCEDURE — 25000003 PHARM REV CODE 250: Performed by: INTERNAL MEDICINE

## 2017-06-29 PROCEDURE — 37000008 HC ANESTHESIA 1ST 15 MINUTES: Performed by: INTERNAL MEDICINE

## 2017-06-29 RX ORDER — SODIUM CHLORIDE 9 MG/ML
INJECTION, SOLUTION INTRAVENOUS CONTINUOUS
Status: DISCONTINUED | OUTPATIENT
Start: 2017-06-29 | End: 2017-06-29 | Stop reason: HOSPADM

## 2017-06-29 RX ORDER — PROPOFOL 10 MG/ML
VIAL (ML) INTRAVENOUS
Status: DISCONTINUED | OUTPATIENT
Start: 2017-06-29 | End: 2017-06-29

## 2017-06-29 RX ORDER — PROPOFOL 10 MG/ML
VIAL (ML) INTRAVENOUS CONTINUOUS PRN
Status: DISCONTINUED | OUTPATIENT
Start: 2017-06-29 | End: 2017-06-29

## 2017-06-29 RX ORDER — FLUCONAZOLE 100 MG/1
100 TABLET ORAL DAILY
Qty: 14 TABLET | Refills: 2 | Status: SHIPPED | OUTPATIENT
Start: 2017-06-29 | End: 2017-07-13

## 2017-06-29 RX ORDER — LIDOCAINE HCL/PF 100 MG/5ML
SYRINGE (ML) INTRAVENOUS
Status: DISCONTINUED | OUTPATIENT
Start: 2017-06-29 | End: 2017-06-29

## 2017-06-29 RX ADMIN — LIDOCAINE HYDROCHLORIDE 40 MG: 20 INJECTION, SOLUTION INTRAVENOUS at 08:06

## 2017-06-29 RX ADMIN — PROPOFOL 200 MCG/KG/MIN: 10 INJECTION, EMULSION INTRAVENOUS at 08:06

## 2017-06-29 RX ADMIN — PROPOFOL 80 MG: 10 INJECTION, EMULSION INTRAVENOUS at 08:06

## 2017-06-29 RX ADMIN — SODIUM CHLORIDE: 0.9 INJECTION, SOLUTION INTRAVENOUS at 08:06

## 2017-06-29 NOTE — TRANSFER OF CARE
"Anesthesia Transfer of Care Note    Patient: Joyce Peterson    Procedure(s) Performed: Procedure(s) (LRB):  ESOPHAGOGASTRODUODENOSCOPY (EGD) (N/A)  COLONOSCOPY (N/A)    Patient location: PACU    Anesthesia Type: general    Transport from OR: Transported from OR on 2-3 L/min O2 by NC with adequate spontaneous ventilation    Post pain: adequate analgesia    Post assessment: no apparent anesthetic complications    Post vital signs: stable    Level of consciousness: awake, alert and oriented    Nausea/Vomiting: no nausea/vomiting    Complications: none    Transfer of care protocol was followed      Last vitals:   Visit Vitals  /80 (BP Location: Left arm, Patient Position: Sitting, BP Method: Automatic)   Pulse 95   Temp 37.6 °C (99.6 °F) (Oral)   Resp 17   Ht 5' 1" (1.549 m)   Wt 67.6 kg (149 lb)   LMP  (LMP Unknown)   SpO2 97%   BMI 28.15 kg/m²     "

## 2017-06-29 NOTE — ANESTHESIA POSTPROCEDURE EVALUATION
"Anesthesia Post Evaluation    Patient: Joyce Peterson    Procedure(s) Performed: Procedure(s) (LRB):  ESOPHAGOGASTRODUODENOSCOPY (EGD) (N/A)  COLONOSCOPY (N/A)    Final Anesthesia Type: general  Patient location during evaluation: GI PACU  Patient participation: Yes- Able to Participate  Level of consciousness: awake and alert, oriented and awake  Post-procedure vital signs: reviewed and stable  Pain management: adequate  Airway patency: patent  PONV status at discharge: No PONV  Anesthetic complications: no      Cardiovascular status: blood pressure returned to baseline and hemodynamically stable  Respiratory status: unassisted, spontaneous ventilation and room air  Hydration status: euvolemic  Follow-up not needed.        Visit Vitals  BP (!) 107/57 (BP Location: Right arm, Patient Position: Sitting, BP Method: Automatic)   Pulse 83   Temp 37.1 °C (98.7 °F) (Oral)   Resp 12   Ht 5' 1" (1.549 m)   Wt 67.6 kg (149 lb)   LMP  (LMP Unknown)   SpO2 98%   BMI 28.15 kg/m²       Pain/Isacc Score: Pain Assessment Performed: Yes (6/29/2017  9:58 AM)  Presence of Pain: denies (6/29/2017  9:58 AM)  Pain Rating Prior to Med Admin: 0 (6/29/2017  9:58 AM)  Pain Rating Post Med Admin: 0 (6/29/2017  9:58 AM)  Isacc Score: 10 (6/29/2017  9:58 AM)      "

## 2017-06-29 NOTE — ANESTHESIA PREPROCEDURE EVALUATION
06/29/2017  Joyce Peterson is a 56 y.o., female.    Pre-op Assessment    I have reviewed the Patient Summary Reports.     I have reviewed the Nursing Notes.   I have reviewed the Medications.     Review of Systems  Anesthesia Hx:  No problems with previous Anesthesia  Denies Family Hx of Anesthesia complications.   Denies Personal Hx of Anesthesia complications.   Social:  Non-Smoker    Hematology/Oncology:     Oncology Normal    -- Anemia:   EENT/Dental:   chronic allergic rhinitis   Cardiovascular:   Calcifications in coronaries without symptoms   Pulmonary:   Asthma mild Rare rescue inhaler use   Renal/:  Renal/ Normal     Hepatic/GI:   GERD, well controlled    Musculoskeletal:   Arthritis     Neurological:  Neurology Normal    Endocrine:   Thyroid nodules and being followed        Physical Exam  General:  Well nourished    Airway/Jaw/Neck:  Airway Findings: Mouth Opening: Normal Tongue: Normal  General Airway Assessment: Adult  Mallampati: I  TM Distance: Normal, at least 6 cm         Dental:  Dental Findings: In tact             Anesthesia Plan  Type of Anesthesia, risks & benefits discussed:  Anesthesia Type:  general  Patient's Preference:   Intra-op Monitoring Plan: standard ASA monitors  Intra-op Monitoring Plan Comments:   Post Op Pain Control Plan:   Post Op Pain Control Plan Comments:   Induction:   IV  Beta Blocker:         Informed Consent: Patient understands risks and agrees with Anesthesia plan.  Questions answered. Anesthesia consent signed with patient.  ASA Score: 3     Day of Surgery Review of History & Physical:    H&P update referred to the surgeon.         Ready For Surgery From Anesthesia Perspective.

## 2017-06-29 NOTE — PATIENT INSTRUCTIONS
Discharge Summary/Instructions after an Endoscopic Procedure  Patient Name: Joyce Peterson  Patient MRN: [id number]  Patient YOB: 1960 Thursday, June 29, 2017  López Moore MD  RESTRICTIONS ON ACTIVITY:  - DO NOT drive a car, operate machinery, make legal/financial decisions, or   drink alcohol until the day after the procedure.    - The following day: return to full activity including work, except no heavy   lifting, straining or running for 3 days if polyps were removed.  - Diet: Eat and drink normally unless instructed otherwise.  TREATMENT FOR COMMON SIDE EFFECTS:  - Mild abdominal pain, bloating or excessive gas: rest, eat lightly and use   a heating pad.  - Sore Throat - treat with throat lozenges. Gargle with warm salt water.  SYMPTOMS TO WATCH FOR AND REPORT TO YOUR PHYSICIAN:  1. Severe abdominal pain or bloating.  2. Pain in chest.  3. Chills or fever occurring within 24 hours after a procedure.  4. A large amount of rectal bleeding, which would show as bright red,   maroon, or black stools. (A small amount of blood from the rectum is not   serious, especially if hemorrhoids are present.)  5. Because air was used during the procedure, expelling large amounts of air   from your rectum or belching is normal.  6. If a bowel prep was taken, you may not have a bowel movement for 1-3   days.  This is normal.  7. Go directly to the emergency room if you notice any of the following:   Chills and/or fever over 101 F   Persistent vomiting   Severe abdominal pain, other than gas cramps   Severe chest pain   Black, tarry stools   Any bleeding - exceeding one tablespoon  Your doctor recommends these additional instructions:  If any biopsies were performed, my office will call you in 5 to 6 business   days with any results.  You have a contact number available for emergencies.  The signs and symptoms   of potential delayed complications were discussed with you.  You may return   to normal activities  tomorrow.  Written discharge instructions were   provided to you.   You are being discharged to home.   Resume your previous diet.   Continue your present medications.   Take Diflucan (fluconazole) 100 mg by mouth once a day for two weeks.   Return to your referring physician after studies are complete.  For questions, problems or results please call your physician - López Moore MD at Work:  [phone].  [facility], EMERGENCY ROOM PHONE NUMBER: [phone]  IF A COMPLICATION OR EMERGENCY SITUATION ARISES AND YOU ARE UNABLE TO REACH   YOUR PHYSICIAN - GO TO THE EMERGENCY ROOM.  López Moore MD  6/29/2017 9:07:03 AM  This report has been verified and signed electronically.

## 2017-06-29 NOTE — H&P
[facility]  History & Physical    Subjective:      Chief Complaint/Reason for Admission: alexx Peterson is a 56 y.o. female.    Past Medical History:   Diagnosis Date    Acid reflux     Allergy     Anemia     Asthma     Coronary artery disease     Degenerative disc disease     Dry eyes     Dry mouth     Gastroparesis     Hyperlipidemia     Lateral meniscus derangement 4/6/2016    Osteoarthritis     Osteoporosis     Rheumatoid arthritis     Umbilical hernia 8/13/2015     Past Surgical History:   Procedure Laterality Date    BREAST BIOPSY Left 01/29/2002    core bx    CARPAL TUNNEL RELEASE Right 05/2017    CHOLECYSTECTOMY  2004    COLONOSCOPY      10/11    TONSILLECTOMY      TUBAL LIGATION  2003    UPPER GASTROINTESTINAL ENDOSCOPY      10/11    uterine ablation  2003     Family History   Problem Relation Age of Onset    Cancer Mother      Lung Cancer    Emphysema Mother     Heart attack Mother     Cancer Father      Lung Cancer    Osteoarthritis Father     Lung cancer Father     Skin cancer Father     Heart disease Brother     Heart attack Brother     Osteoarthritis Paternal Aunt     Retinal detachment Maternal Aunt     No Known Problems Sister     No Known Problems Maternal Uncle     No Known Problems Paternal Uncle     No Known Problems Maternal Grandmother     No Known Problems Maternal Grandfather     No Known Problems Paternal Grandmother     No Known Problems Paternal Grandfather     Colon cancer Neg Hx     Esophageal cancer Neg Hx     Stomach cancer Neg Hx     Celiac disease Neg Hx     Diabetes Neg Hx     Thyroid disease Neg Hx     Amblyopia Neg Hx     Blindness Neg Hx     Cataracts Neg Hx     Glaucoma Neg Hx     Hypertension Neg Hx     Macular degeneration Neg Hx     Strabismus Neg Hx     Stroke Neg Hx      Social History   Substance Use Topics    Smoking status: Never Smoker    Smokeless tobacco: Never Used    Alcohol  use Yes      Comment: occasionally       PTA Medications   Medication Sig    albuterol 90 mcg/actuation inhaler Inhale 2 puffs into the lungs every 6 (six) hours as needed for Wheezing.    aspirin 81 mg Tab Take 81 mg by mouth every morning.     azelastine (ASTELIN) 137 mcg nasal spray 1 spray (137 mcg total) by Nasal route 2 (two) times daily. (Patient taking differently: 1 spray by Nasal route 2 (two) times daily as needed. )    budesonide-formoterol 160-4.5 mcg (SYMBICORT) 160-4.5 mcg/actuation HFAA Inhale 2 puffs into the lungs every 4 to 6 hours as needed. (Patient taking differently: Inhale 2 puffs into the lungs every 12 (twelve) hours. )    calcium citrate-vitamin D (CITRACAL + D) 315-200 mg-unit per tablet Take 1 tablet by mouth 2 (two) times daily.     CRESTOR 20 mg tablet Take 1 tablet (20 mg total) by mouth once daily.    CYCLOSPORINE (RESTASIS OPHT) Inject 0.05 % into the eye. 1 Dropperette Both eyes Twice a day .  dispense one month supply/sixty-four vials/ two trays    fish oil-omega-3 fatty acids 300-1,000 mg capsule Take 1,400 g by mouth once daily.    flaxseed oil 1,000 mg Cap Take by mouth once daily.    GUAIFENESIN (MUCINEX ORAL) Take 400 mg by mouth every 4 (four) hours as needed.     INV PROPULSID 10 MG Take 20 mg by mouth 4 (four) times daily as directed by physician.  For investigational use only    leflunomide (ARAVA) 20 MG Tab Take 1 tablet (20 mg total) by mouth once daily.    montelukast (SINGULAIR) 10 mg tablet TAKE 1 TABLET AT BEDTIME (Patient taking differently: TAKE 1 TABLET AM)    naproxen (NAPROSYN) 500 MG tablet Take 1 tablet (500 mg total) by mouth 2 (two) times daily.    omeprazole (PRILOSEC) 40 MG capsule TAKE ONE CAPSULE BY MOUTH EVERY MORNING    omeprazole-sodium bicarbonate (ZEGERID) 40-1.1 mg-gram per capsule Take 1 capsule by mouth every morning.    POTASSIUM ORAL Take by mouth once daily.    predniSONE (DELTASONE) 5 MG tablet Take 1 tablet (5 mg total) by  "mouth once daily.    PREMARIN 0.625 mg tablet Take 0.625 mg by mouth once daily.    progesterone (PROMETRIUM) 100 MG capsule Take 100 mg by mouth every morning. 1 Capsule Oral Every day, morning    rizatriptan (MAXALT) 10 MG tablet Take 10 mg by mouth as needed for Migraine.     sucralfate (CARAFATE) 1 gram tablet Take 1 tablet (1 g total) by mouth 4 (four) times daily.    tofacitinib (XELJANZ XR) 11 mg Tb24 Take 1 tablet by mouth once daily.    tramadol (ULTRAM) 50 mg tablet TAKE 1 TABLET DAILY AS NEEDED FOR PAIN    VESICARE 10 mg tablet Take 1 tablet by mouth every morning.     vitamin E 1000 UNIT capsule Take 1,000 Units by mouth once daily.     Review of patient's allergies indicates:   Allergen Reactions    Actemra [tocilizumab] Other (See Comments)     Severe dizziness    Codeine Nausea And Vomiting    Gold au 198 Hives and Rash    Hydroxychloroquine Other (See Comments)     Can't remember the reaction      Iodinated contrast media - oral and iv dye Other (See Comments)     Other reaction(s): BURNING ALL OVER    Iodine Other (See Comments)     Other reaction(s): BURNING ALL OVER - Iodine dye - Not topical    Sulfa (sulfonamide antibiotics) Other (See Comments)     Can't remember the reaction    Zofran [ondansetron hcl (pf)] Nausea And Vomiting     Pt reports that last time she received zofran she started vomiting again    Methotrexate analogues Nausea Only    Pneumovax 23 [pneumococcal 23-argenis ps vaccine] Other (See Comments)     "sick"        Review of Systems   Respiratory: Negative.    Cardiovascular: Negative.        Objective:      Vital Signs (Most Recent)  Temp: 99.6 °F (37.6 °C) (06/29/17 0838)  Pulse: 95 (06/29/17 0838)  Resp: 17 (06/29/17 0838)  BP: 135/80 (06/29/17 0838)  SpO2: 97 % (06/29/17 0838)    Vital Signs Range (Last 24H):  Temp:  [99.6 °F (37.6 °C)]   Pulse:  [95]   Resp:  [17]   BP: (135)/(80)   SpO2:  [97 %]     Physical Exam   Constitutional: She is oriented to person, " place, and time.   Neck: Normal range of motion.   Cardiovascular: Normal rate and regular rhythm.    Pulmonary/Chest: Effort normal and breath sounds normal.   Neurological: She is alert and oriented to person, place, and time.       Data Review:     ECG: .     Assessment:      There are no hospital problems to display for this patient.      Plan:    Indication for procedure:anemia    ASA:II  Airway normal  Malampati class:per anes    Personal and family history negative for anesthesia problems    Plan:egd/colon  Anesthesia plan: general

## 2017-06-29 NOTE — PATIENT INSTRUCTIONS
Discharge Summary/Instructions after an Endoscopic Procedure  Patient Name: Joyec Peterson  Patient MRN: [id number]  Patient YOB: 1960 Thursday, June 29, 2017  López Moore MD  RESTRICTIONS ON ACTIVITY:  - DO NOT drive a car, operate machinery, make legal/financial decisions, or   drink alcohol until the day after the procedure.    - The following day: return to full activity including work, except no heavy   lifting, straining or running for 3 days if polyps were removed.  - Diet: Eat and drink normally unless instructed otherwise.  TREATMENT FOR COMMON SIDE EFFECTS:  - Mild abdominal pain, bloating or excessive gas: rest, eat lightly and use   a heating pad.  - Sore Throat - treat with throat lozenges. Gargle with warm salt water.  SYMPTOMS TO WATCH FOR AND REPORT TO YOUR PHYSICIAN:  1. Severe abdominal pain or bloating.  2. Pain in chest.  3. Chills or fever occurring within 24 hours after a procedure.  4. A large amount of rectal bleeding, which would show as bright red,   maroon, or black stools. (A small amount of blood from the rectum is not   serious, especially if hemorrhoids are present.)  5. Because air was used during the procedure, expelling large amounts of air   from your rectum or belching is normal.  6. If a bowel prep was taken, you may not have a bowel movement for 1-3   days.  This is normal.  7. Go directly to the emergency room if you notice any of the following:   Chills and/or fever over 101 F   Persistent vomiting   Severe abdominal pain, other than gas cramps   Severe chest pain   Black, tarry stools   Any bleeding - exceeding one tablespoon  Your doctor recommends these additional instructions:  If any biopsies were performed, my office will call you in 5 to 6 business   days with any results.  None  For questions, problems or results please call your physician - López Moore MD at Work:  [phone].  [facility], EMERGENCY ROOM PHONE NUMBER: [phone]  IF A  COMPLICATION OR EMERGENCY SITUATION ARISES AND YOU ARE UNABLE TO REACH   YOUR PHYSICIAN - GO TO THE EMERGENCY ROOM.  López Moore MD  6/29/2017 9:28:08 AM  This report has been verified and signed electronically.

## 2017-06-29 NOTE — DISCHARGE INSTRUCTIONS

## 2017-06-30 ENCOUNTER — CLINICAL SUPPORT (OUTPATIENT)
Dept: REHABILITATION | Facility: HOSPITAL | Age: 57
End: 2017-06-30
Attending: ORTHOPAEDIC SURGERY
Payer: COMMERCIAL

## 2017-06-30 DIAGNOSIS — M79.641 PAIN IN RIGHT HAND: Primary | ICD-10-CM

## 2017-06-30 DIAGNOSIS — M25.60 RANGE OF MOTION DEFICIT: ICD-10-CM

## 2017-06-30 DIAGNOSIS — M25.521 PAIN IN RIGHT ELBOW: ICD-10-CM

## 2017-06-30 DIAGNOSIS — R29.898 DECREASED GRIP STRENGTH OF RIGHT HAND: ICD-10-CM

## 2017-06-30 DIAGNOSIS — J30.89 PERENNIAL ALLERGIC RHINITIS: ICD-10-CM

## 2017-06-30 PROCEDURE — G8985 CARRY GOAL STATUS: HCPCS | Mod: CJ

## 2017-06-30 PROCEDURE — G8984 CARRY CURRENT STATUS: HCPCS | Mod: CK

## 2017-06-30 PROCEDURE — 97035 APP MDLTY 1+ULTRASOUND EA 15: CPT

## 2017-06-30 PROCEDURE — 97165 OT EVAL LOW COMPLEX 30 MIN: CPT

## 2017-06-30 PROCEDURE — 97110 THERAPEUTIC EXERCISES: CPT

## 2017-06-30 RX ORDER — MONTELUKAST SODIUM 10 MG/1
10 TABLET ORAL NIGHTLY
Qty: 30 TABLET | Refills: 2 | Status: SHIPPED | OUTPATIENT
Start: 2017-06-30 | End: 2017-08-25 | Stop reason: SDUPTHER

## 2017-06-30 NOTE — PLAN OF CARE
Occupational Therapy -Hand / Wrist  Evaluation    Patient: Joyce Peterson  Date of Evaluation: 2017  Referring Physician:  Dr. SERJIO Phipps  Diagnosis: Right carpal tunnel s/p release 2017, Right lateral epicondylitis  1. Pain in right hand     2. Pain in right elbow     3. Range of motion deficit     4. Decreased  strength of right hand       MRN: [id number]    Referral Orders:   Eval and treat     Start Time: 11:00  End Time: 12:00  Total Time: 60 min     Hand dominance: Right    Occupation:  retired  Working presently:  no  Workmen's Compensation:  no    Date of onset: several years but has escalated within last year  Involved area:  Right hand and elbow  Mechanism of Injury:   No specific cause  Past Medical History/Physical Systems Review: Rheumatoid Arthritis, R CTR on 2017    Living status:     Environmental Concerns/ Fall Risk:  None  Barriers to Learning: None   Cultural/Spiritual : None   Developmental/Education: None   Abuse/ Neglect: none   Nutritional Deficit: None   Language: None   Hearing/Vision Deficit: None   Other:     Subjective:  Pt reports Pain is intermittent    Pain   At rest: 0 out of 10  With work/ Activity: 6 out of 10  Sleepin out of 10  Location of Pain : Right hand and elbow    Patients goals for therapy are:  decrease pain in wrist and elbow    Objective:   Observation  :Has carpal tunnel brace    Sensation: intact  Scar / Wound: Flat, well healed  Edema:No edema noted          Range of Motion:     Finger ROM is WNL.    Right Left   Wrist ext/flex 50/25 45/35  RD/UD  10/ 10/25                                                                                    Strength: (JACIEL Dynamometer in lbs.), Average 3 trials, Position II: deferred        Pinch Strength: (Pinch Gauge in psis), Average 3 trials:deferred          Functional Limitations:   Self Care / ADL: Limited use of right hand for ADLs, grooming, hygiene  Work/Activities: Limited use of  right hand for grasping and lifting  Leisure Activity: Pt unable to fish.    Patient w/ FOTO score of 49% intake limitation rating, see attached for details      Treatment Included:   OT evaluation and instruction in written HEP including  Wrist flex/ext stretches RD/UD, TGEs, finger lifts, opposition 1 x 10 reps. Patient received fluidotherapy to right hand and moist heat to right elbow for 15 minutes to increase blood flow, circulation, desensitization, sensory re-education and for pain management. Patient received ultrasound to right lateral epicondyle area to increase blood flow, circulation, tissue elasticity, pain management and for wound/scar management for 8 minutes @ 1.0 Mhz, Intensity 1.0 w/cm2 at 20%* duty cycle.         Patient demonstrates good understanding of HEP/modality use for pain management.    Assessment:   Problem List :       Decreased ROM   Decreased  and pinch strength   Decreased muscle strength   Decreased functional hand use   Increased pain   Scar Adhesions     History Examination Decision Making Complexity Score   Occupational Profile:   Completed an brief chart review        Medical and Therapy History:   Comorbidities that may affect POC include: RA          Score: low   Performance Deficits   Dominant UE affected; Few    Physical  Decreased  ROM, Strength, Coordination (GMC/FMC) and Activity Tolerance   Impaired Sensation which inhibit pt's [place] with ADL's, IADL's    Cognitive - NONE   Attention, Problem Solving, Memory, Sequencing, Insight into deficits are none impaired.    Psychosocial:    Social Skills, Mood/Affect, Behavior are not impaired.    Score: low  Low analytic complexity, based on:  few treatment options available      Modifications : none    Orthotic: yes     FOTO score:49%      Score: low  Low complexity        Goals: ( 6 weeks)   1)  Patient to be IND with HEP and modalities for pain management  2)  Increase ROM 3-5 degrees to increase functional hand use  for ADLs/work/leisure activities  3)   Measure /pinch once 6 weeks post op.  4)   Patient to score at 37%  or less on FOTO to demonstrate improved perception of functional right hand use.        Plan:   Patient to be treated by Occupational Therapy    1-2    times per week for   6-8                   weeks  during the certification period from   6/30/2017     to  8/30/2017   to achieve the established goals.  Treatment to include :  paraffin, fluidotherapy, manual therapy/joint mobilizations,  modalities for pain management, US 3mhz, therapeutic exercises/activities,        strengthening,    scar and edema management, as well as any other treatments deemed necessary based on the patient's needs or progress.               I certify the need for these services furnished under this plan of treatment and while under my care    ____________________________________                         __________________  Physician/Referring Practitioner                                               Date of Signature

## 2017-06-30 NOTE — TELEPHONE ENCOUNTER
----- Message from Dyana Pinto sent at 6/29/2017  4:07 PM CDT -----  Contact: Patient, phone [phone]  Type: Sooner appointment than  is able to schedule    When is the first available appointment? 8/29/17    What is the nature of the appointment? Medication refill for asthma medicine     What appointment type: EPP    Comments:  The patient says her refill for montelukast was denied because she was told Dr. Stratton wanted her to have a follow up first.  She would like to get in to see Dr. Stratton this week. Please call the patient.     Thanks!

## 2017-07-04 DIAGNOSIS — M06.9 RHEUMATOID ARTHRITIS, INVOLVING UNSPECIFIED SITE, UNSPECIFIED RHEUMATOID FACTOR PRESENCE: ICD-10-CM

## 2017-07-04 RX ORDER — PREDNISONE 5 MG/1
TABLET ORAL
Qty: 45 TABLET | Refills: 1 | Status: SHIPPED | OUTPATIENT
Start: 2017-07-04 | End: 2017-08-29 | Stop reason: SDUPTHER

## 2017-07-05 ENCOUNTER — CLINICAL SUPPORT (OUTPATIENT)
Dept: REHABILITATION | Facility: HOSPITAL | Age: 57
End: 2017-07-05
Attending: ORTHOPAEDIC SURGERY
Payer: COMMERCIAL

## 2017-07-05 DIAGNOSIS — M79.641 PAIN IN RIGHT HAND: Primary | ICD-10-CM

## 2017-07-05 DIAGNOSIS — M25.521 PAIN IN RIGHT ELBOW: ICD-10-CM

## 2017-07-05 DIAGNOSIS — R29.898 DECREASED GRIP STRENGTH OF RIGHT HAND: ICD-10-CM

## 2017-07-05 DIAGNOSIS — M25.60 RANGE OF MOTION DEFICIT: ICD-10-CM

## 2017-07-05 PROCEDURE — 97110 THERAPEUTIC EXERCISES: CPT

## 2017-07-05 PROCEDURE — 97022 WHIRLPOOL THERAPY: CPT

## 2017-07-05 NOTE — PROGRESS NOTES
"OT Daily Progress Note    Patient:  Joyce GUALLPA [facility] [address] #:  [id number]   Date of Note: 07/05/2017   Referring Physician:  Staci Yarbrough, *  Diagnosis:  Right carpal tunnel syndrome s/p release 5/29/2017 and R lateral epicondylitis  Encounter Diagnoses   Name Primary?    Pain in right hand Yes    Pain in right elbow     Range of motion deficit     Decreased  strength of right hand         Visit 2 of 20, expires 12/31/2017  FOTO / NO FOTO    Start Time: 9:30  End Time: 10:00  Total Time: 30 min  Group Time: 0      Subjective:   "I went to the gym and pulled on a weight machine with my right hand and now it hurts and a little swollen."  Pain: 2-3 out of 10     Objective:   Patient seen by OT this session. Treatment  consist of the following: Patient received fluidotherapy to right hand and moist heat to right elbow for 15 minutes to increase blood flow, circulation, desensitization, sensory re-education and for pain management.   Performed US 1 mhz 1.0 w/cm2 x 8 min x 20% to right lateral epicondyle to  increase blood flow, circulation,and soft tissue elasticity. Pt then performed TGEs, thumb opposition, finger ext, wrist flex/ext and RD/UD 1 x 10 reps. Pt also received DTM to carpal tunnel region. Pt instructed not to use right hand for working out until she is 6 weeks post op. Pt verbalized good understanding. Minimal edema is noted over CT region.        Treatment: Fluidotherapy x 15 min and Therapeutic exercises x 15 min     Assessment:  Pt will continue to benefit from skilled OT intervention.   Patient is making good progress toward established goals.   Patient continues to demonstrate limitation  with  ROM, Joint mobility, Stiffness, Decreased fine motor coordination, Decreased gross motor coordination, Decreased functional use, Impaired sensation, Decreased strength, Continued pain and Continued inflammation       Goals: ( 6 weeks)   1)  Patient to be IND with HEP and modalities for pain " management  2)  Increase ROM 3-5 degrees to increase functional hand use for ADLs/work/leisure activities  3)   Measure /pinch once 6 weeks post op.  4)   Patient to score at 37%  or less on FOTO to demonstrate improved perception of functional right hand use.           Plan:   Patient to be treated by Occupational Therapy    1-2    times per week for   6-8                   weeks  during the certification period from   6/30/2017     to  8/30/2017   to achieve the established goals.

## 2017-07-06 ENCOUNTER — TELEPHONE (OUTPATIENT)
Dept: ENDOSCOPY | Facility: HOSPITAL | Age: 57
End: 2017-07-06

## 2017-07-11 ENCOUNTER — TELEPHONE (OUTPATIENT)
Dept: PHARMACY | Facility: CLINIC | Age: 57
End: 2017-07-11

## 2017-07-17 NOTE — PROGRESS NOTES
"Ms. Peterson is here today for a post-operative visit.  She is 22 days status post Right Carpal Tunnel Release by Dr. Yarbrough on 5/29/17. She reports that she is doing well.  Pain is mild (3/10) and well controlled.  She has been on Prednisone.  She is concerned about the appearance of her scar and wants to be sure that it is not opening up.  She denies fever, chills, and sweats since the time of the surgery.     Physical exam:    Vitals:    06/20/17 1335 06/20/17 1336   BP: 126/74    Pulse: 72    Temp: 97.8 °F (36.6 °C)    TempSrc: Oral    Weight: 67.6 kg (149 lb)    Height: 5' 1" (1.549 m)    PainSc:   3   3     Vital signs are stable, patient is afebrile.  Patient is well dressed and well groomed, no acute distress.  Alert and oriented to person, place, and time.  Incision is clean, dry, and intact; scab noted at incision and scar is noted to be wide.  Tender to palpation.  There is no erythema or exudate.  There is no sign of any infection. She is NVI.  Good ROM of fingers, thumb ROM still limited.    Assessment: 22 days status post Right Carpal Tunnel Release    Plan:  Joyce was seen today for post-op evaluation.    Diagnoses and all orders for this visit:    Right carpal tunnel syndrome  -     Ambulatory Referral to Physical/Occupational Therapy    Lateral epicondylitis of right elbow  -     Ambulatory Referral to Physical/Occupational Therapy    Cubital tunnel syndrome on right  -     Ambulatory Referral to Physical/Occupational Therapy    Orthopedic aftercare        - Discussed wound healing delays with use of prednisone  - She is interested in starting OT as previously ordered, will message therapist to get it scheduled  - Continue finger ROM exercises  - Follow up as needed  - Call with questions or concerns    "

## 2017-07-19 ENCOUNTER — CLINICAL SUPPORT (OUTPATIENT)
Dept: REHABILITATION | Facility: HOSPITAL | Age: 57
End: 2017-07-19
Attending: ORTHOPAEDIC SURGERY
Payer: COMMERCIAL

## 2017-07-19 DIAGNOSIS — M25.521 PAIN IN RIGHT ELBOW: ICD-10-CM

## 2017-07-19 DIAGNOSIS — M79.641 PAIN IN RIGHT HAND: Primary | ICD-10-CM

## 2017-07-19 DIAGNOSIS — R29.898 DECREASED GRIP STRENGTH OF RIGHT HAND: ICD-10-CM

## 2017-07-19 DIAGNOSIS — M25.60 RANGE OF MOTION DEFICIT: ICD-10-CM

## 2017-07-19 PROCEDURE — 97110 THERAPEUTIC EXERCISES: CPT

## 2017-07-19 PROCEDURE — 97022 WHIRLPOOL THERAPY: CPT

## 2017-07-19 NOTE — PROGRESS NOTES
"OT Daily Progress Note    Patient:  Joyce GUALLPA [facility] #:  [id number]   Date of Note: 07/19/2017   Referring Physician:  Staci Yarbrough, *  Diagnosis:  Right carpal tunnel syndrome s/p release 5/29/2017 and R lateral epicondylitis  Encounter Diagnoses   Name Primary?    Pain in right hand Yes    Pain in right elbow     Range of motion deficit     Decreased  strength of right hand         Visit 2 of 20, expires 12/31/2017  FOTO / NO FOTO    Start Time: 8:00  End Time: 8:45  Total Time: 45 min  Group Time: 0      Subjective:   "I'm getting better."  Pain: 2-3 out of 10     Objective:      (average of 3, 2nd notch) Right 55 lbs, Left 55 lbs.  Pinch (average of 3 in psi):   Right Left  Key  12 10  3 pt  11 13  2 pt 9 10    Patient seen by OT this session. Treatment  consist of the following: Patient received fluidotherapy to right hand and moist heat to right elbow for 15 minutes to increase blood flow, circulation, desensitization, sensory re-education and for pain management.   Performed US 1 mhz 1.0 w/cm2 x 8 min x 20% to right lateral epicondyle to  increase blood flow, circulation,and soft tissue elasticity. Pt then performed TGEs, thumb opposition, finger ext, wrist flex/ext and RD/UD 1 x 10 reps. Pt also received DTM to carpal tunnel region.  Minimal edema is noted over CT region.  Pt given red theraputty to perform the following HEP: 2' molding, 3 logs each for key, 3pt and 2pt. Pt demonstrated and verbalized good understanding of HEP.      Treatment: Fluidotherapy x 15 min and Therapeutic exercises x 30 min     Assessment:  Measured /pinch today. Advanced strengthening ex.  Pt will continue to benefit from skilled OT intervention.   Patient is making good progress toward established goals.   Patient continues to demonstrate limitation  with  ROM, Joint mobility, Stiffness, Decreased fine motor coordination, Decreased gross motor coordination, Decreased functional use, Impaired " sensation, Decreased strength, Continued pain and Continued inflammation       Goals: ( 6 weeks)   1)  Patient to be IND with HEP and modalities for pain management  2)  Increase ROM 3-5 degrees to increase functional hand use for ADLs/work/leisure activities  3)   Measure /pinch once 6 weeks post op met  4)   Patient to score at 37%  or less on FOTO to demonstrate improved perception of functional right hand use.    New Goal 7/19/2017    1) Pt will increase  by 3-5 lbs to grasp objects.  2) Pt will increase pinch by 2-3 psi to help with fastening objects, opening containers.           Plan:   Patient to be treated by Occupational Therapy    1-2    times per week for   6-8                   weeks  during the certification period from   6/30/2017     to  8/30/2017   to achieve the established goals.

## 2017-07-20 ENCOUNTER — OFFICE VISIT (OUTPATIENT)
Dept: PULMONOLOGY | Facility: CLINIC | Age: 57
End: 2017-07-20
Payer: COMMERCIAL

## 2017-07-20 VITALS
DIASTOLIC BLOOD PRESSURE: 70 MMHG | HEIGHT: 61 IN | HEART RATE: 95 BPM | SYSTOLIC BLOOD PRESSURE: 118 MMHG | OXYGEN SATURATION: 97 % | WEIGHT: 149.25 LBS | BODY MASS INDEX: 28.18 KG/M2

## 2017-07-20 DIAGNOSIS — J45.909 UNCOMPLICATED ASTHMA, UNSPECIFIED ASTHMA SEVERITY: Primary | ICD-10-CM

## 2017-07-20 DIAGNOSIS — J30.1 CHRONIC ALLERGIC RHINITIS DUE TO POLLEN, UNSPECIFIED SEASONALITY: ICD-10-CM

## 2017-07-20 DIAGNOSIS — J45.20 CHRONIC ASTHMA, MILD INTERMITTENT, UNCOMPLICATED: Primary | ICD-10-CM

## 2017-07-20 PROCEDURE — 99999 PR PBB SHADOW E&M-EST. PATIENT-LVL III: CPT | Mod: PBBFAC,,, | Performed by: INTERNAL MEDICINE

## 2017-07-20 PROCEDURE — 99214 OFFICE O/P EST MOD 30 MIN: CPT | Mod: S$GLB,,, | Performed by: INTERNAL MEDICINE

## 2017-07-20 RX ORDER — FLUTICASONE PROPIONATE 50 MCG
2 SPRAY, SUSPENSION (ML) NASAL DAILY
Qty: 16 G | Refills: 6 | Status: SHIPPED | OUTPATIENT
Start: 2017-07-20 | End: 2017-08-19

## 2017-07-20 RX ORDER — CYCLOSPORINE 0.5 MG/ML
EMULSION OPHTHALMIC
COMMUNITY
Start: 2017-07-16 | End: 2018-10-14 | Stop reason: SDUPTHER

## 2017-07-20 NOTE — PROGRESS NOTES
"Subjective:       Patient ID: Joyce Peterson is a 56 y.o. female.    Chief Complaint: Asthma    56 year old who is on chronic immunosuppression secondary to RA.  In the past, has had frequent exacerbations of asthma.  Currently well controlled on symbicort and has not required albuterol "in forever".  Had candidal esophagitis in May which did not exacerbate her asthma.  Currently complaining of cough due to allergic rhinits cold. Thrush and reflux has resolved at this point.      Review of Systems   HENT: Negative for trouble swallowing.    Respiratory: Negative for hemoptysis, choking, shortness of breath and wheezing.    Gastrointestinal: Negative for acid reflux.       Objective:       Vitals:    07/20/17 0933   BP: 118/70   Pulse: 95   SpO2: 97%   Weight: 67.7 kg (149 lb 4 oz)   Height: 5' 1" (1.549 m)     Physical Exam   Constitutional: She is oriented to person, place, and time. She appears well-developed and well-nourished.   Cardiovascular: Normal rate and regular rhythm.    Pulmonary/Chest: Normal expansion and hyperinflation. She has no wheezes. She has no rales.   Musculoskeletal: Normal range of motion.   Lymphadenopathy: No supraclavicular adenopathy is present.     She has no cervical adenopathy.   Neurological: She is alert and oriented to person, place, and time.   Skin: Skin is warm and dry.   Psychiatric: She has a normal mood and affect.        Personal Diagnostic Review  none pertinent  No flowsheet data found.      Assessment:       1. Chronic asthma, mild intermittent, uncomplicated    2. Chronic allergic rhinitis due to pollen, unspecified seasonality        Outpatient Encounter Prescriptions as of 7/20/2017   Medication Sig Dispense Refill    albuterol 90 mcg/actuation inhaler Inhale 2 puffs into the lungs every 6 (six) hours as needed for Wheezing. 18 g 11    aspirin 81 mg Tab Take 81 mg by mouth every morning.       azelastine (ASTELIN) 137 mcg nasal spray 1 spray (137 mcg total) by " Nasal route 2 (two) times daily. (Patient taking differently: 1 spray by Nasal route 2 (two) times daily as needed. ) 30 mL 11    budesonide-formoterol 160-4.5 mcg (SYMBICORT) 160-4.5 mcg/actuation HFAA Inhale 2 puffs into the lungs every 4 to 6 hours as needed. (Patient taking differently: Inhale 2 puffs into the lungs every 12 (twelve) hours. ) 1 Inhaler 12    calcium citrate-vitamin D (CITRACAL + D) 315-200 mg-unit per tablet Take 1 tablet by mouth 2 (two) times daily.       CRESTOR 20 mg tablet Take 1 tablet (20 mg total) by mouth once daily. 90 tablet 3    CYCLOSPORINE (RESTASIS OPHT) Inject 0.05 % into the eye. 1 Dropperette Both eyes Twice a day .  dispense one month supply/sixty-four vials/ two trays      fish oil-omega-3 fatty acids 300-1,000 mg capsule Take 1,400 g by mouth once daily.      flaxseed oil 1,000 mg Cap Take by mouth once daily.      GUAIFENESIN (MUCINEX ORAL) Take 400 mg by mouth every 4 (four) hours as needed.       INV PROPULSID 10 MG Take 20 mg by mouth 4 (four) times daily as directed by physician.  For investigational use only 480 each 2    leflunomide (ARAVA) 20 MG Tab Take 1 tablet (20 mg total) by mouth once daily. 30 tablet 2    montelukast (SINGULAIR) 10 mg tablet Take 1 tablet (10 mg total) by mouth nightly. 30 tablet 2    naproxen (NAPROSYN) 500 MG tablet Take 1 tablet (500 mg total) by mouth 2 (two) times daily. 60 tablet 1    omeprazole (PRILOSEC) 40 MG capsule TAKE ONE CAPSULE BY MOUTH EVERY MORNING 30 capsule 6    omeprazole-sodium bicarbonate (ZEGERID) 40-1.1 mg-gram per capsule Take 1 capsule by mouth every morning. 30 capsule 11    POTASSIUM ORAL Take by mouth once daily.      predniSONE (DELTASONE) 5 MG tablet Take 1 tablet (5 mg total) by mouth once daily. 30 tablet 3    predniSONE (DELTASONE) 5 MG tablet TAKE 1 & 1/2 TABS EVERY DAY 45 tablet 1    PREMARIN 0.625 mg tablet Take 0.625 mg by mouth once daily.  11    progesterone (PROMETRIUM) 100 MG capsule  Take 100 mg by mouth every morning. 1 Capsule Oral Every day, morning      RESTASIS 0.05 % ophthalmic emulsion       rizatriptan (MAXALT) 10 MG tablet Take 10 mg by mouth as needed for Migraine.       sucralfate (CARAFATE) 1 gram tablet Take 1 tablet (1 g total) by mouth 4 (four) times daily. 120 tablet 6    tofacitinib (XELJANZ XR) 11 mg Tb24 Take 1 tablet by mouth once daily. 30 tablet 1    tramadol (ULTRAM) 50 mg tablet TAKE 1 TABLET DAILY AS NEEDED FOR PAIN 30 tablet 0    VESICARE 10 mg tablet Take 1 tablet by mouth every morning.       vitamin E 1000 UNIT capsule Take 1,000 Units by mouth once daily.       No facility-administered encounter medications on file as of 7/20/2017.      No orders of the defined types were placed in this encounter.    Plan:       Doing great, will decrease ICS component of symbicort to one puff twice daily.  Feel that allergic rhinitis is most likely trigger.  STart flonase in addition to others.  For thrush will give spacer and decreased ICS should help.     RTC in four months with lisa and dlco

## 2017-07-21 ENCOUNTER — PATIENT MESSAGE (OUTPATIENT)
Dept: PULMONOLOGY | Facility: CLINIC | Age: 57
End: 2017-07-21

## 2017-07-21 DIAGNOSIS — I25.10 CORONARY ARTERY DISEASE INVOLVING NATIVE CORONARY ARTERY OF NATIVE HEART WITHOUT ANGINA PECTORIS: Chronic | ICD-10-CM

## 2017-07-21 RX ORDER — ROSUVASTATIN CALCIUM 20 MG/1
TABLET, COATED ORAL
Qty: 90 TABLET | Refills: 3 | Status: SHIPPED | OUTPATIENT
Start: 2017-07-21 | End: 2018-08-15 | Stop reason: SDUPTHER

## 2017-07-21 RX ORDER — MONTELUKAST SODIUM 10 MG/1
10 TABLET ORAL NIGHTLY
Qty: 30 TABLET | Refills: 11 | Status: SHIPPED | OUTPATIENT
Start: 2017-07-21 | End: 2017-08-20

## 2017-07-27 ENCOUNTER — OFFICE VISIT (OUTPATIENT)
Dept: ORTHOPEDICS | Facility: CLINIC | Age: 57
End: 2017-07-27
Payer: COMMERCIAL

## 2017-07-27 ENCOUNTER — HOSPITAL ENCOUNTER (OUTPATIENT)
Dept: RADIOLOGY | Facility: HOSPITAL | Age: 57
Discharge: HOME | End: 2017-07-27
Attending: ORTHOPAEDIC SURGERY
Payer: COMMERCIAL

## 2017-07-27 VITALS — WEIGHT: 150.44 LBS | BODY MASS INDEX: 28.4 KG/M2 | HEIGHT: 61 IN

## 2017-07-27 DIAGNOSIS — M84.374A STRESS FRACTURE, RIGHT FOOT, INITIAL ENCOUNTER FOR FRACTURE: ICD-10-CM

## 2017-07-27 DIAGNOSIS — M79.671 RIGHT FOOT PAIN: Primary | ICD-10-CM

## 2017-07-27 DIAGNOSIS — M79.671 RIGHT FOOT PAIN: ICD-10-CM

## 2017-07-27 PROCEDURE — 99214 OFFICE O/P EST MOD 30 MIN: CPT | Mod: S$GLB,,, | Performed by: ORTHOPAEDIC SURGERY

## 2017-07-27 PROCEDURE — 99999 PR PBB SHADOW E&M-EST. PATIENT-LVL II: CPT | Mod: PBBFAC,,, | Performed by: ORTHOPAEDIC SURGERY

## 2017-07-27 PROCEDURE — 73630 X-RAY EXAM OF FOOT: CPT | Mod: 26,RT,, | Performed by: RADIOLOGY

## 2017-07-27 PROCEDURE — 73630 X-RAY EXAM OF FOOT: CPT | Mod: TC,RT

## 2017-07-27 RX ORDER — NAPROXEN 500 MG/1
TABLET ORAL
Qty: 60 TABLET | Refills: 1 | Status: SHIPPED | OUTPATIENT
Start: 2017-07-27 | End: 2017-08-21 | Stop reason: SDUPTHER

## 2017-07-27 NOTE — PROGRESS NOTES
"Subjective:      Patient ID: Joyce Peterson is a 56 y.o. female.    Chief Complaint: Pain of the Right Foot    HPI.  She 56-year-old female with rheumatoid arthritis has had a history of stress fractures in both feet who developed insidious onset of pain in her right foot about 2 weeks ago similar to pain from previous stress fractures.  She comes in today for evaluation.  She reports pain with weightbearing and swelling.  I saw her about 6 months ago for a left foot problem.  I felt that she had some posterior tibial tendon dysfunction on the left side and she states that she has been stable using orthotics.  She has a history of osteopenia    Past Medical History:   Diagnosis Date    Acid reflux     Allergy     Anemia     Asthma     Coronary artery disease     Degenerative disc disease     Dry eyes     Dry mouth     Gastroparesis     Hyperlipidemia     Lateral meniscus derangement 4/6/2016    Osteoarthritis     Osteoporosis     Rheumatoid arthritis(714.0)     Umbilical hernia 8/13/2015         Review of Systems   Constitution: Negative for fever and weight loss.   Cardiovascular: Negative for chest pain.   Respiratory: Negative for shortness of breath.    Musculoskeletal: Positive for arthritis.         Objective:      Vitals:    07/27/17 0933   Weight: 68.2 kg (150 lb 7.4 oz)   Height: 5' 1" (1.549 m)           General    Constitutional: She is oriented to person, place, and time. She appears well-developed and well-nourished.   Neurological: She is alert and oriented to person, place, and time.     General Musculoskeletal Exam   Gait: antalgic     Right Ankle/Foot Exam     Inspection   Deformity: absent    Swelling   The patient is swollen on the metatarsals.    Pain   The patient exhibits pain of the metatarsals.    Range of Motion   The patient has normal right ankle ROM.    Alignment   Midfoot Alignment: flexible planus    Muscle Strength   The patient has normal right ankle " strength.    Other   Sensation: normal    Comments:  Tender over base of 2nd and 3rd metatarsals        Vascular Exam     Right Pulses  Dorsalis Pedis:      2+            X-ray: I ordered and reviewed an x-ray of the right foot.  X-rays reveal some mild flattening of the longitudinal arch with some degenerative changes of the naviculocuneiform articulation.  There is evidence of previous fracture of the second metatarsal shaft with thickening of the cortices.  There are no obvious fractures noted near the base of the second and third metatarsals.          Assessment:       Encounter Diagnoses   Name Primary?    Right foot pain Yes    Stress fracture, right foot, initial encounter for fracture           Plan:       Joyce was seen today for pain.    Diagnoses and all orders for this visit:    Right foot pain  -     X-Ray Foot Complete Right; Future    Stress fracture, right foot, initial encounter for fracture      Recommendation: Based on her history I would treat this as a stress fracture at this time.  I would've put her in a fracture boot.  He can return to see me in 4 weeks.  She should have a repeat x-ray of her foot at that time if her symptoms persist.  I had some discussion with her about obtaining more custom orthotics.

## 2017-07-31 ENCOUNTER — OFFICE VISIT (OUTPATIENT)
Dept: FAMILY MEDICINE | Facility: CLINIC | Age: 57
End: 2017-07-31
Payer: COMMERCIAL

## 2017-07-31 ENCOUNTER — TELEPHONE (OUTPATIENT)
Dept: FAMILY MEDICINE | Facility: CLINIC | Age: 57
End: 2017-07-31

## 2017-07-31 VITALS
WEIGHT: 147 LBS | OXYGEN SATURATION: 98 % | TEMPERATURE: 99 F | HEIGHT: 61 IN | BODY MASS INDEX: 27.75 KG/M2 | SYSTOLIC BLOOD PRESSURE: 104 MMHG | DIASTOLIC BLOOD PRESSURE: 64 MMHG | HEART RATE: 84 BPM | RESPIRATION RATE: 18 BRPM

## 2017-07-31 DIAGNOSIS — R09.81 SINUS CONGESTION: Primary | ICD-10-CM

## 2017-07-31 PROCEDURE — 99999 PR PBB SHADOW E&M-EST. PATIENT-LVL III: CPT | Mod: PBBFAC,,, | Performed by: FAMILY MEDICINE

## 2017-07-31 PROCEDURE — 3008F BODY MASS INDEX DOCD: CPT | Mod: S$GLB,,, | Performed by: FAMILY MEDICINE

## 2017-07-31 PROCEDURE — 99214 OFFICE O/P EST MOD 30 MIN: CPT | Mod: S$GLB,,, | Performed by: FAMILY MEDICINE

## 2017-07-31 NOTE — TELEPHONE ENCOUNTER
----- Message from Valarie Perdomo sent at 7/31/2017  3:17 PM CDT -----  Contact: [phone]/self  Pt requesting to speak with you concerning medication.  Please advise

## 2017-07-31 NOTE — PROGRESS NOTES
HPI:  Joyce Peterson is a 56 y.o. year old female that  presents with complaint of allergy complaints  Chief Complaint   Patient presents with    Sinusitis    Headache   .     HPI      Past Medical History:   Diagnosis Date    Acid reflux     Allergy     Anemia     Asthma     Coronary artery disease     Degenerative disc disease     Dry eyes     Dry mouth     Gastroparesis     Hyperlipidemia     Lateral meniscus derangement 4/6/2016    Osteoarthritis     Osteoporosis     Rheumatoid arthritis(714.0)     Umbilical hernia 8/13/2015     Social History     Social History    Marital status:      Spouse name: N/A    Number of children: N/A    Years of education: N/A     Occupational History    Not on file.     Social History Main Topics    Smoking status: Never Smoker    Smokeless tobacco: Never Used    Alcohol use Yes      Comment: occasionally    Drug use: No    Sexual activity: Yes     Partners: Male     Birth control/ protection: Surgical     Other Topics Concern    Not on file     Social History Narrative    No narrative on file     Past Surgical History:   Procedure Laterality Date    BREAST BIOPSY Left 01/29/2002    core bx    CARPAL TUNNEL RELEASE Right 05/2017    CHOLECYSTECTOMY  2004    COLONOSCOPY      10/11    COLONOSCOPY N/A 6/29/2017    Procedure: COLONOSCOPY;  Surgeon: López Moore MD;  Location: [facility] ([address] [place] [address]);  Service: Endoscopy;  Laterality: N/A;    TONSILLECTOMY      TUBAL LIGATION  2003    UPPER GASTROINTESTINAL ENDOSCOPY      10/11    uterine ablation  2003     Family History   Problem Relation Age of Onset    Cancer Mother      Lung Cancer    Emphysema Mother     Heart attack Mother     Cancer Father      Lung Cancer    Osteoarthritis Father     Lung cancer Father     Skin cancer Father     Heart disease Brother     Heart attack Brother     Osteoarthritis Paternal Aunt     Retinal detachment Maternal Aunt     No Known Problems Sister   "   No Known Problems Maternal Uncle     No Known Problems Paternal Uncle     No Known Problems Maternal Grandmother     No Known Problems Maternal Grandfather     No Known Problems Paternal Grandmother     No Known Problems Paternal Grandfather     Colon cancer Neg Hx     Esophageal cancer Neg Hx     Stomach cancer Neg Hx     Celiac disease Neg Hx     Diabetes Neg Hx     Thyroid disease Neg Hx     Amblyopia Neg Hx     Blindness Neg Hx     Cataracts Neg Hx     Glaucoma Neg Hx     Hypertension Neg Hx     Macular degeneration Neg Hx     Strabismus Neg Hx     Stroke Neg Hx            Review of Systems  General ROS: negative for chills, fever or weight loss  ENT ROS: negative for epistaxis, sore throat or rhinorrhea  Respiratory ROS: no cough, shortness of breath, or wheezing  Cardiovascular ROS: no chest pain or dyspnea on exertion  Gastrointestinal ROS: no abdominal pain, change in bowel habits, or black/ bloody stools    Physical Exam:  /64 (BP Location: Right arm, Patient Position: Sitting, BP Method: Manual)   Pulse 84   Temp 98.6 °F (37 °C) (Oral)   Resp 18   Ht 5' 1" (1.549 m)   Wt 66.7 kg (147 lb)   SpO2 98%   BMI 27.78 kg/m²   General appearance: alert, cooperative, no distress  Constitutional:Oriented to person, place, and time.appears well-developed and well-nourished.  HEENT: Normocephalic, atraumatic, neck symmetrical, no nasal discharge, TM- clear bilaterally  Lungs: clear to auscultation bilaterally, no dullness to percussion bilaterally  Heart: regular rate and rhythm without rub; no displacement of the PMI , S1&S2 present  Abdomen: soft, non-tender; bowel sounds normoactive; no organomegaly  Physical Exam    LABS:    Complete Blood Count  Lab Results   Component Value Date    RBC 4.21 05/05/2017    HGB 9.6 (L) 05/05/2017    HCT 31.8 (L) 05/05/2017    MCV 76 (L) 05/05/2017    MCH 22.8 (L) 05/05/2017    MCHC 30.2 (L) 05/05/2017    RDW 19.1 (H) 05/05/2017     (H) " 05/05/2017    MPV 9.8 05/05/2017    GRAN 4.6 05/05/2017    GRAN 59.2 05/05/2017    LYMPH 2.2 05/05/2017    LYMPH 28.6 05/05/2017    MONO 0.8 05/05/2017    MONO 10.8 05/05/2017    EOS 0.1 05/05/2017    BASO 0.04 05/05/2017    EOSINOPHIL 0.6 05/05/2017    BASOPHIL 0.5 05/05/2017    DIFFMETHOD Automated 05/05/2017       Comprehensive Metabolic Panel  Lab Results   Component Value Date    GLU 83 05/05/2017    BUN 14 05/05/2017    CREATININE 1.0 05/05/2017     05/05/2017    K 4.2 05/05/2017     05/05/2017    PROT 6.7 05/05/2017    ALBUMIN 3.8 05/05/2017    BILITOT 0.3 05/05/2017    AST 20 05/05/2017    ALKPHOS 50 (L) 05/05/2017    CO2 26 05/05/2017    ALT 20 05/05/2017    ANIONGAP 8 05/05/2017    EGFRNONAA >60.0 05/05/2017    ESTGFRAFRICA >60.0 05/05/2017       LIPID  Lab Results   Component Value Date    CHOL 203 (H) 11/19/2016    HDL 98 (H) 11/19/2016         TSH  Lab Results   Component Value Date    TSH 0.339 (L) 03/15/2017       Current Outpatient Prescriptions   Medication Sig Dispense Refill    aspirin 81 mg Tab Take 81 mg by mouth every morning.       azelastine (ASTELIN) 137 mcg nasal spray 1 spray (137 mcg total) by Nasal route 2 (two) times daily. (Patient taking differently: 1 spray by Nasal route 2 (two) times daily as needed. ) 30 mL 11    budesonide-formoterol 160-4.5 mcg (SYMBICORT) 160-4.5 mcg/actuation HFAA Inhale 2 puffs into the lungs every 4 to 6 hours as needed. (Patient taking differently: Inhale 2 puffs into the lungs every 12 (twelve) hours. ) 1 Inhaler 12    calcium citrate-vitamin D (CITRACAL + D) 315-200 mg-unit per tablet Take 1 tablet by mouth 2 (two) times daily.       CYCLOSPORINE (RESTASIS OPHT) Inject 0.05 % into the eye. 1 Dropperette Both eyes Twice a day .  dispense one month supply/sixty-four vials/ two trays      fish oil-omega-3 fatty acids 300-1,000 mg capsule Take 1,400 g by mouth once daily.      flaxseed oil 1,000 mg Cap Take by mouth once daily.       fluticasone (FLONASE) 50 mcg/actuation nasal spray 2 sprays by Each Nare route once daily. 16 g 6    GUAIFENESIN (MUCINEX ORAL) Take 400 mg by mouth every 4 (four) hours as needed.       INV PROPULSID 10 MG Take 20 mg by mouth 4 (four) times daily as directed by physician.  For investigational use only 480 each 2    leflunomide (ARAVA) 20 MG Tab Take 1 tablet (20 mg total) by mouth once daily. 30 tablet 2    montelukast (SINGULAIR) 10 mg tablet Take 1 tablet (10 mg total) by mouth nightly. 30 tablet 2    montelukast (SINGULAIR) 10 mg tablet Take 1 tablet (10 mg total) by mouth every evening. 30 tablet 11    naproxen (NAPROSYN) 500 MG tablet TAKE 1 TABLET TWICE A DAY 60 tablet 1    omeprazole (PRILOSEC) 40 MG capsule TAKE ONE CAPSULE BY MOUTH EVERY MORNING 30 capsule 6    omeprazole-sodium bicarbonate (ZEGERID) 40-1.1 mg-gram per capsule Take 1 capsule by mouth every morning. 30 capsule 11    POTASSIUM ORAL Take by mouth once daily.      predniSONE (DELTASONE) 5 MG tablet Take 1 tablet (5 mg total) by mouth once daily. 30 tablet 3    predniSONE (DELTASONE) 5 MG tablet TAKE 1 & 1/2 TABS EVERY DAY 45 tablet 1    PREMARIN 0.625 mg tablet Take 0.625 mg by mouth once daily.  11    progesterone (PROMETRIUM) 100 MG capsule Take 100 mg by mouth every morning. 1 Capsule Oral Every day, morning      RESTASIS 0.05 % ophthalmic emulsion       rizatriptan (MAXALT) 10 MG tablet Take 10 mg by mouth as needed for Migraine.       rosuvastatin (CRESTOR) 20 MG tablet TAKE 1 TABLET EVERY DAY 90 tablet 3    sucralfate (CARAFATE) 1 gram tablet Take 1 tablet (1 g total) by mouth 4 (four) times daily. 120 tablet 6    tofacitinib (XELJANZ XR) 11 mg Tb24 Take 1 tablet by mouth once daily. 30 tablet 1    tramadol (ULTRAM) 50 mg tablet TAKE 1 TABLET DAILY AS NEEDED FOR PAIN 30 tablet 0    VESICARE 10 mg tablet Take 1 tablet by mouth every morning.       vitamin E 1000 UNIT capsule Take 1,000 Units by mouth once daily.       albuterol 90 mcg/actuation inhaler Inhale 2 puffs into the lungs every 6 (six) hours as needed for Wheezing. 18 g 11    methylPREDNISolone (MEDROL DOSEPACK) 4 mg tablet use as directed 1 Package 0     No current facility-administered medications for this visit.        Assessment:    ICD-10-CM ICD-9-CM    1. Sinus congestion R09.81 478.19 methylPREDNISolone (MEDROL DOSEPACK) 4 mg tablet         Plan:  Encouraged pt to try Neti pot , Flonase, and anti-histamine. No signs of infection.  Return if symptoms worsen or fail to improve.          Kayy Flores MD

## 2017-07-31 NOTE — TELEPHONE ENCOUNTER
Patient wanted to know where she could buy antihistamine MD recommended. Informed patient [facility] carries it.

## 2017-08-02 ENCOUNTER — TELEPHONE (OUTPATIENT)
Dept: FAMILY MEDICINE | Facility: CLINIC | Age: 57
End: 2017-08-02

## 2017-08-02 ENCOUNTER — PATIENT MESSAGE (OUTPATIENT)
Dept: PULMONOLOGY | Facility: CLINIC | Age: 57
End: 2017-08-02

## 2017-08-02 ENCOUNTER — OFFICE VISIT (OUTPATIENT)
Dept: PODIATRY | Facility: CLINIC | Age: 57
End: 2017-08-02
Payer: COMMERCIAL

## 2017-08-02 VITALS
DIASTOLIC BLOOD PRESSURE: 85 MMHG | BODY MASS INDEX: 27.75 KG/M2 | HEART RATE: 103 BPM | WEIGHT: 147 LBS | HEIGHT: 61 IN | SYSTOLIC BLOOD PRESSURE: 130 MMHG

## 2017-08-02 DIAGNOSIS — M79.671 RIGHT FOOT PAIN: ICD-10-CM

## 2017-08-02 DIAGNOSIS — M06.9 RHEUMATOID ARTHRITIS, INVOLVING UNSPECIFIED SITE, UNSPECIFIED RHEUMATOID FACTOR PRESENCE: ICD-10-CM

## 2017-08-02 DIAGNOSIS — M79.89 FOOT SWELLING: ICD-10-CM

## 2017-08-02 DIAGNOSIS — M65.9 SYNOVITIS OF FOOT: ICD-10-CM

## 2017-08-02 DIAGNOSIS — M84.374S STRESS FRACTURE OF RIGHT FOOT, SEQUELA: Primary | ICD-10-CM

## 2017-08-02 PROCEDURE — 99999 PR PBB SHADOW E&M-EST. PATIENT-LVL V: CPT | Mod: PBBFAC,,, | Performed by: PODIATRIST

## 2017-08-02 PROCEDURE — 3008F BODY MASS INDEX DOCD: CPT | Mod: S$GLB,,, | Performed by: PODIATRIST

## 2017-08-02 PROCEDURE — 99214 OFFICE O/P EST MOD 30 MIN: CPT | Mod: S$GLB,,, | Performed by: PODIATRIST

## 2017-08-02 RX ORDER — METHYLPREDNISOLONE 4 MG/1
TABLET ORAL
Qty: 1 PACKAGE | Refills: 0 | Status: SHIPPED | OUTPATIENT
Start: 2017-08-02 | End: 2017-08-23

## 2017-08-02 NOTE — TELEPHONE ENCOUNTER
Patient states she doesn't need prednisone pack, because she is already on prednisone for arthritis. Patient would like to have Z-pack.

## 2017-08-02 NOTE — TELEPHONE ENCOUNTER
----- Message from Latesha Wallis sent at 8/2/2017  3:17 PM CDT -----  Contact: Self [phone]  Patient is calling to talk to nurse concerning she is not feeling better she is doing everything she was told can you call in a z-pack to her local pharmacy. Please advice

## 2017-08-02 NOTE — PROGRESS NOTES
"CC:   Foot pain       HPI:       Joyce Peterson is a 56 y.o. female who presents to clinic complaining of right foot pain  She has history of Rheumatoid Arthritis and osteoporosis.   She reports right foot pain at the lateral forefoot aspect for about 2 weeks without apparent trauma to the are.  She reports that she saw Dr. Steele last week and was given a CAM boot to wear.   She states that the boot is uncomfortable and does not seem to improve the pain at this time.  She reports no fevers or chills.  She will being going on a trip for a few weeks in October and wants to make sure "it's not serious".          Past Medical History:   Diagnosis Date    Acid reflux     Allergy     Anemia     Asthma     Coronary artery disease     Degenerative disc disease     Dry eyes     Dry mouth     Gastroparesis     Hyperlipidemia     Lateral meniscus derangement 4/6/2016    Osteoarthritis     Osteoporosis     Rheumatoid arthritis(714.0)     Umbilical hernia 8/13/2015         Current Outpatient Prescriptions on File Prior to Visit   Medication Sig Dispense Refill    albuterol 90 mcg/actuation inhaler Inhale 2 puffs into the lungs every 6 (six) hours as needed for Wheezing. 18 g 11    aspirin 81 mg Tab Take 81 mg by mouth every morning.       azelastine (ASTELIN) 137 mcg nasal spray 1 spray (137 mcg total) by Nasal route 2 (two) times daily. (Patient taking differently: 1 spray by Nasal route 2 (two) times daily as needed. ) 30 mL 11    budesonide-formoterol 160-4.5 mcg (SYMBICORT) 160-4.5 mcg/actuation HFAA Inhale 2 puffs into the lungs every 4 to 6 hours as needed. (Patient taking differently: Inhale 2 puffs into the lungs every 12 (twelve) hours. ) 1 Inhaler 12    calcium citrate-vitamin D (CITRACAL + D) 315-200 mg-unit per tablet Take 1 tablet by mouth 2 (two) times daily.       CYCLOSPORINE (RESTASIS OPHT) Inject 0.05 % into the eye. 1 Dropperette Both eyes Twice a day .  dispense one month " supply/sixty-four vials/ two trays      fish oil-omega-3 fatty acids 300-1,000 mg capsule Take 1,400 g by mouth once daily.      flaxseed oil 1,000 mg Cap Take by mouth once daily.      fluticasone (FLONASE) 50 mcg/actuation nasal spray 2 sprays by Each Nare route once daily. 16 g 6    GUAIFENESIN (MUCINEX ORAL) Take 400 mg by mouth every 4 (four) hours as needed.       INV PROPULSID 10 MG Take 20 mg by mouth 4 (four) times daily as directed by physician.  For investigational use only 480 each 2    leflunomide (ARAVA) 20 MG Tab Take 1 tablet (20 mg total) by mouth once daily. 30 tablet 2    montelukast (SINGULAIR) 10 mg tablet Take 1 tablet (10 mg total) by mouth nightly. 30 tablet 2    montelukast (SINGULAIR) 10 mg tablet Take 1 tablet (10 mg total) by mouth every evening. 30 tablet 11    naproxen (NAPROSYN) 500 MG tablet TAKE 1 TABLET TWICE A DAY 60 tablet 1    omeprazole (PRILOSEC) 40 MG capsule TAKE ONE CAPSULE BY MOUTH EVERY MORNING 30 capsule 6    omeprazole-sodium bicarbonate (ZEGERID) 40-1.1 mg-gram per capsule Take 1 capsule by mouth every morning. 30 capsule 11    POTASSIUM ORAL Take by mouth once daily.      predniSONE (DELTASONE) 5 MG tablet Take 1 tablet (5 mg total) by mouth once daily. 30 tablet 3    predniSONE (DELTASONE) 5 MG tablet TAKE 1 & 1/2 TABS EVERY DAY 45 tablet 1    PREMARIN 0.625 mg tablet Take 0.625 mg by mouth once daily.  11    progesterone (PROMETRIUM) 100 MG capsule Take 100 mg by mouth every morning. 1 Capsule Oral Every day, morning      RESTASIS 0.05 % ophthalmic emulsion       rizatriptan (MAXALT) 10 MG tablet Take 10 mg by mouth as needed for Migraine.       rosuvastatin (CRESTOR) 20 MG tablet TAKE 1 TABLET EVERY DAY 90 tablet 3    sucralfate (CARAFATE) 1 gram tablet Take 1 tablet (1 g total) by mouth 4 (four) times daily. 120 tablet 6    tofacitinib (XELJANZ XR) 11 mg Tb24 Take 1 tablet by mouth once daily. 30 tablet 1    tramadol (ULTRAM) 50 mg tablet TAKE  "1 TABLET DAILY AS NEEDED FOR PAIN 30 tablet 0    VESICARE 10 mg tablet Take 1 tablet by mouth every morning.       vitamin E 1000 UNIT capsule Take 1,000 Units by mouth once daily.      methylPREDNISolone (MEDROL DOSEPACK) 4 mg tablet use as directed 1 Package 0     No current facility-administered medications on file prior to visit.            Review of patient's allergies indicates:   Allergen Reactions    Actemra [tocilizumab] Other (See Comments)     Severe dizziness    Codeine Nausea And Vomiting    Gold au 198 Hives and Rash    Hydroxychloroquine Other (See Comments)     Can't remember the reaction      Iodinated contrast media - oral and iv dye Other (See Comments)     Other reaction(s): BURNING ALL OVER    Iodine Other (See Comments)     Other reaction(s): BURNING ALL OVER - Iodine dye - Not topical    Sulfa (sulfonamide antibiotics) Other (See Comments)     Can't remember the reaction    Zofran [ondansetron hcl (pf)] Nausea And Vomiting     Pt reports that last time she received zofran she started vomiting again    Methotrexate analogues Nausea Only    Pneumovax 23 [pneumococcal 23-argenis ps vaccine] Other (See Comments)     "sick"             ROS:  General ROS: negative for  chills, fatigue or fever  Cardiovascular ROS: no chest pain or dyspnea on exertion  Musculoskeletal ROS: negative for joint pain or joint stiffness.  Negative for loss of strength.  Positive for foot pain.   Neuro ROS: Negative for syncope, numbness, or muscle weakness  Skin ROS: Negative for rash, itching or nail/hair changes.           OBJECTIVE:         Vitals:    08/02/17 0945   BP: 130/85   BP Location: Right arm   Patient Position: Sitting   BP Method: Automatic   Pulse: 103   Weight: 66.7 kg (147 lb)   Height: 5' 1" (1.549 m)        Right Lower extremity exam:  Vasc: Palpable pedal pulses. Feet appropriately warm to touch. Cap refill time is within normal limits   Neurological:  Light touch, proprioception, and " Sharp/dull sensation are all intact. There is no Tinel's along the tarsal tunnel.    Derm: No open lesions, macerations, or rashes.  MSK:  Tenderness to palpation near the 3rd, 4th and 5th metatarso-phalangeal joint, minimal swelling, no bruising or redness noted.  No increased temperature note.  No foot deformity.        Imagin17:   Polyarthritis consistent with RA similar to old exam. No acute abnormality.           ASSESSMENT/PLAN:      I counseled the patient on her conditions, their implications and medical management.      Stress fracture of right foot, sequela  -     MRI Lower Extremity W WO Contrast Right; Future; Expected date: 2017    Right foot pain  -     MRI Lower Extremity W WO Contrast Right; Future; Expected date: 2017    Foot swelling  -     MRI Lower Extremity W WO Contrast Right; Future; Expected date: 2017    Synovitis of foot    Rheumatoid arthritis, involving unspecified site, unspecified rheumatoid factor presence  · Continue mgmt by Rheumatology     Transition to Darco stiff sole shoes to see if it's more comfortable to wear than CAM boot.  Rest and elevate as much as possible.  Monitor for worsening signs and symptoms.  MRI scheduled.  Will call with results.  Patient is amenable to plan.

## 2017-08-03 NOTE — TELEPHONE ENCOUNTER
----- Message from Obdulia Kingston sent at 8/3/2017 10:06 AM CDT -----  Contact: [phone]/ self   Pt its requesting to speak with the nurse in regarding the doctor sending the wrong prescription to the pharmacy yesterday . Pt states she is suffering with out the medication . Please advise

## 2017-08-04 ENCOUNTER — TELEPHONE (OUTPATIENT)
Dept: PHARMACY | Facility: CLINIC | Age: 57
End: 2017-08-04

## 2017-08-07 ENCOUNTER — HOSPITAL ENCOUNTER (OUTPATIENT)
Dept: RADIOLOGY | Facility: HOSPITAL | Age: 57
Discharge: HOME | End: 2017-08-07
Attending: PODIATRIST
Payer: COMMERCIAL

## 2017-08-07 ENCOUNTER — PATIENT MESSAGE (OUTPATIENT)
Dept: PODIATRY | Facility: CLINIC | Age: 57
End: 2017-08-07

## 2017-08-07 ENCOUNTER — PATIENT MESSAGE (OUTPATIENT)
Dept: GASTROENTEROLOGY | Facility: CLINIC | Age: 57
End: 2017-08-07

## 2017-08-07 DIAGNOSIS — M79.671 RIGHT FOOT PAIN: ICD-10-CM

## 2017-08-07 DIAGNOSIS — M79.89 FOOT SWELLING: ICD-10-CM

## 2017-08-07 DIAGNOSIS — M84.374S STRESS FRACTURE OF RIGHT FOOT, SEQUELA: ICD-10-CM

## 2017-08-07 PROCEDURE — 73718 MRI LOWER EXTREMITY W/O DYE: CPT | Mod: 26,RT,, | Performed by: RADIOLOGY

## 2017-08-07 PROCEDURE — 73718 MRI LOWER EXTREMITY W/O DYE: CPT | Mod: TC,RT

## 2017-08-07 RX ORDER — ALBUTEROL SULFATE 90 UG/1
2 AEROSOL, METERED RESPIRATORY (INHALATION) EVERY 6 HOURS PRN
Qty: 18 G | Refills: 11 | Status: SHIPPED | OUTPATIENT
Start: 2017-08-07 | End: 2018-09-04 | Stop reason: SDUPTHER

## 2017-08-08 ENCOUNTER — TELEPHONE (OUTPATIENT)
Dept: RHEUMATOLOGY | Facility: CLINIC | Age: 57
End: 2017-08-08

## 2017-08-08 ENCOUNTER — PATIENT MESSAGE (OUTPATIENT)
Dept: RHEUMATOLOGY | Facility: CLINIC | Age: 57
End: 2017-08-08

## 2017-08-08 ENCOUNTER — TELEPHONE (OUTPATIENT)
Dept: PODIATRY | Facility: CLINIC | Age: 57
End: 2017-08-08

## 2017-08-08 DIAGNOSIS — E05.90 HYPERTHYROIDISM: Primary | ICD-10-CM

## 2017-08-08 DIAGNOSIS — E21.5 PARATHYROID ABNORMALITY: ICD-10-CM

## 2017-08-08 NOTE — TELEPHONE ENCOUNTER
Please schedule standing labs, iPTH, TSH, free T4 and vitamin D this week and 15 min appt next week to discuss osteopenia and start denosumab(Prolia). Thanks MEGA

## 2017-08-08 NOTE — TELEPHONE ENCOUNTER
----- Message from Milena Moore sent at 8/8/2017 12:16 PM CDT -----  Contact: self/home  Pt would like her MRI results.

## 2017-08-09 ENCOUNTER — TELEPHONE (OUTPATIENT)
Dept: RHEUMATOLOGY | Facility: CLINIC | Age: 57
End: 2017-08-09

## 2017-08-09 ENCOUNTER — PATIENT MESSAGE (OUTPATIENT)
Dept: RHEUMATOLOGY | Facility: CLINIC | Age: 57
End: 2017-08-09

## 2017-08-10 ENCOUNTER — PATIENT MESSAGE (OUTPATIENT)
Dept: GASTROENTEROLOGY | Facility: CLINIC | Age: 57
End: 2017-08-10

## 2017-08-10 DIAGNOSIS — K31.84 GASTROPARESIS: Primary | ICD-10-CM

## 2017-08-11 ENCOUNTER — TELEPHONE (OUTPATIENT)
Dept: RHEUMATOLOGY | Facility: CLINIC | Age: 57
End: 2017-08-11

## 2017-08-11 ENCOUNTER — LAB VISIT (OUTPATIENT)
Dept: LAB | Facility: HOSPITAL | Age: 57
End: 2017-08-11
Attending: INTERNAL MEDICINE
Payer: COMMERCIAL

## 2017-08-11 DIAGNOSIS — Z79.631 METHOTREXATE, LONG TERM, CURRENT USE: ICD-10-CM

## 2017-08-11 DIAGNOSIS — E05.90 HYPERTHYROIDISM: ICD-10-CM

## 2017-08-11 DIAGNOSIS — M06.9 RHEUMATOID ARTHRITIS OF HAND, UNSPECIFIED LATERALITY, UNSPECIFIED RHEUMATOID FACTOR PRESENCE: ICD-10-CM

## 2017-08-11 DIAGNOSIS — E21.5 PARATHYROID ABNORMALITY: ICD-10-CM

## 2017-08-11 DIAGNOSIS — D50.9 IRON DEFICIENCY ANEMIA, UNSPECIFIED IRON DEFICIENCY ANEMIA TYPE: Primary | ICD-10-CM

## 2017-08-11 LAB
25(OH)D3+25(OH)D2 SERPL-MCNC: 45 NG/ML
ALBUMIN SERPL BCP-MCNC: 3.3 G/DL
ALP SERPL-CCNC: 98 U/L
ALT SERPL W/O P-5'-P-CCNC: 14 U/L
ANION GAP SERPL CALC-SCNC: 11 MMOL/L
AST SERPL-CCNC: 17 U/L
BASOPHILS # BLD AUTO: 0.05 K/UL
BASOPHILS NFR BLD: 0.9 %
BILIRUB SERPL-MCNC: 0.2 MG/DL
BUN SERPL-MCNC: 15 MG/DL
CALCIUM SERPL-MCNC: 8.9 MG/DL
CHLORIDE SERPL-SCNC: 106 MMOL/L
CO2 SERPL-SCNC: 22 MMOL/L
CREAT SERPL-MCNC: 1 MG/DL
CRP SERPL-MCNC: 1.8 MG/L
DIFFERENTIAL METHOD: ABNORMAL
EOSINOPHIL # BLD AUTO: 0 K/UL
EOSINOPHIL NFR BLD: 0.5 %
ERYTHROCYTE [DISTWIDTH] IN BLOOD BY AUTOMATED COUNT: 20.4 %
ERYTHROCYTE [SEDIMENTATION RATE] IN BLOOD BY WESTERGREN METHOD: 17 MM/HR
EST. GFR  (AFRICAN AMERICAN): >60 ML/MIN/1.73 M^2
EST. GFR  (NON AFRICAN AMERICAN): >60 ML/MIN/1.73 M^2
GLUCOSE SERPL-MCNC: 84 MG/DL
HCT VFR BLD AUTO: 31.1 %
HGB BLD-MCNC: 9.6 G/DL
LYMPHOCYTES # BLD AUTO: 2.2 K/UL
LYMPHOCYTES NFR BLD: 38 %
MCH RBC QN AUTO: 21.6 PG
MCHC RBC AUTO-ENTMCNC: 30.9 G/DL
MCV RBC AUTO: 70 FL
MONOCYTES # BLD AUTO: 0.8 K/UL
MONOCYTES NFR BLD: 13.2 %
NEUTROPHILS # BLD AUTO: 2.7 K/UL
NEUTROPHILS NFR BLD: 47 %
PLATELET # BLD AUTO: 395 K/UL
PMV BLD AUTO: 9.4 FL
POTASSIUM SERPL-SCNC: 4.1 MMOL/L
PROT SERPL-MCNC: 6.5 G/DL
PTH-INTACT SERPL-MCNC: 75 PG/ML
RBC # BLD AUTO: 4.45 M/UL
SODIUM SERPL-SCNC: 139 MMOL/L
T3FREE SERPL-MCNC: 2.6 PG/ML
T4 FREE SERPL-MCNC: 1.12 NG/DL
TSH SERPL DL<=0.005 MIU/L-ACNC: 0.64 UIU/ML
WBC # BLD AUTO: 5.68 K/UL

## 2017-08-11 PROCEDURE — 84481 FREE ASSAY (FT-3): CPT

## 2017-08-11 PROCEDURE — 85651 RBC SED RATE NONAUTOMATED: CPT

## 2017-08-11 PROCEDURE — 36415 COLL VENOUS BLD VENIPUNCTURE: CPT | Mod: PO

## 2017-08-11 PROCEDURE — 82306 VITAMIN D 25 HYDROXY: CPT

## 2017-08-11 PROCEDURE — 83970 ASSAY OF PARATHORMONE: CPT

## 2017-08-11 PROCEDURE — 85025 COMPLETE CBC W/AUTO DIFF WBC: CPT

## 2017-08-11 PROCEDURE — 84443 ASSAY THYROID STIM HORMONE: CPT

## 2017-08-11 PROCEDURE — 84439 ASSAY OF FREE THYROXINE: CPT

## 2017-08-11 PROCEDURE — 80053 COMPREHEN METABOLIC PANEL: CPT

## 2017-08-11 PROCEDURE — 86140 C-REACTIVE PROTEIN: CPT

## 2017-08-17 ENCOUNTER — PATIENT MESSAGE (OUTPATIENT)
Dept: ENDOCRINOLOGY | Facility: CLINIC | Age: 57
End: 2017-08-17

## 2017-08-17 DIAGNOSIS — R94.6 ABNORMAL THYROID FUNCTION TEST: Primary | ICD-10-CM

## 2017-08-18 ENCOUNTER — TELEPHONE (OUTPATIENT)
Dept: RHEUMATOLOGY | Facility: CLINIC | Age: 57
End: 2017-08-18

## 2017-08-20 NOTE — TELEPHONE ENCOUNTER
Results for orders placed or performed in visit on 08/11/17   CBC auto differential   Result Value Ref Range    WBC 5.68 3.90 - 12.70 K/uL    RBC 4.45 4.00 - 5.40 M/uL    Hemoglobin 9.6 (L) 12.0 - 16.0 g/dL    Hematocrit 31.1 (L) 37.0 - 48.5 %    MCV 70 (L) 82 - 98 fL    MCH 21.6 (L) 27.0 - 31.0 pg    MCHC 30.9 (L) 32.0 - 36.0 g/dL    RDW 20.4 (H) 11.5 - 14.5 %    Platelets 395 (H) 150 - 350 K/uL    MPV 9.4 9.2 - 12.9 fL    Gran # 2.7 1.8 - 7.7 K/uL    Lymph # 2.2 1.0 - 4.8 K/uL    Mono # 0.8 0.3 - 1.0 K/uL    Eos # 0.0 0.0 - 0.5 K/uL    Baso # 0.05 0.00 - 0.20 K/uL    Gran% 47.0 38.0 - 73.0 %    Lymph% 38.0 18.0 - 48.0 %    Mono% 13.2 4.0 - 15.0 %    Eosinophil% 0.5 0.0 - 8.0 %    Basophil% 0.9 0.0 - 1.9 %    Differential Method Automated    Comprehensive metabolic panel   Result Value Ref Range    Sodium 139 136 - 145 mmol/L    Potassium 4.1 3.5 - 5.1 mmol/L    Chloride 106 95 - 110 mmol/L    CO2 22 (L) 23 - 29 mmol/L    Glucose 84 70 - 110 mg/dL    BUN, Bld 15 6 - 20 mg/dL    Creatinine 1.0 0.5 - 1.4 mg/dL    Calcium 8.9 8.7 - 10.5 mg/dL    Total Protein 6.5 6.0 - 8.4 g/dL    Albumin 3.3 (L) 3.5 - 5.2 g/dL    Total Bilirubin 0.2 0.1 - 1.0 mg/dL    Alkaline Phosphatase 98 55 - 135 U/L    AST 17 10 - 40 U/L    ALT 14 10 - 44 U/L    Anion Gap 11 8 - 16 mmol/L    eGFR if African American >60.0 >60 mL/min/1.73 m^2    eGFR if non African American >60.0 >60 mL/min/1.73 m^2   Sedimentation rate, manual   Result Value Ref Range    Sed Rate 17 0 - 20 mm/Hr   C-reactive protein   Result Value Ref Range    CRP 1.8 0.0 - 8.2 mg/L   TSH   Result Value Ref Range    TSH 0.638 0.400 - 4.000 uIU/mL   Vitamin D   Result Value Ref Range    Vit D, 25-Hydroxy 45 30 - 96 ng/mL   T4, FREE   Result Value Ref Range    Free T4 1.12 0.71 - 1.51 ng/dL   T3, FREE   Result Value Ref Range    T3, Free 2.6 2.3 - 4.2 pg/mL   PTH, intact   Result Value Ref Range    PTH, Intact 75.0 9.0 - 77.0 pg/mL     *Note: Due to a large number of results  and/or encounters for the requested time period, some results have not been displayed. A complete set of results can be found in Results Review.

## 2017-08-21 ENCOUNTER — OFFICE VISIT (OUTPATIENT)
Dept: PODIATRY | Facility: CLINIC | Age: 57
End: 2017-08-21
Payer: COMMERCIAL

## 2017-08-21 ENCOUNTER — TELEPHONE (OUTPATIENT)
Dept: GASTROENTEROLOGY | Facility: CLINIC | Age: 57
End: 2017-08-21

## 2017-08-21 ENCOUNTER — OFFICE VISIT (OUTPATIENT)
Dept: RHEUMATOLOGY | Facility: CLINIC | Age: 57
End: 2017-08-21
Payer: COMMERCIAL

## 2017-08-21 VITALS
DIASTOLIC BLOOD PRESSURE: 76 MMHG | HEART RATE: 98 BPM | SYSTOLIC BLOOD PRESSURE: 120 MMHG | BODY MASS INDEX: 27.75 KG/M2 | HEIGHT: 61 IN | WEIGHT: 147 LBS

## 2017-08-21 VITALS
DIASTOLIC BLOOD PRESSURE: 76 MMHG | WEIGHT: 152 LBS | BODY MASS INDEX: 25.95 KG/M2 | HEART RATE: 111 BPM | SYSTOLIC BLOOD PRESSURE: 119 MMHG | HEIGHT: 64 IN

## 2017-08-21 DIAGNOSIS — M84.374S STRESS FRACTURE OF RIGHT FOOT, SEQUELA: Primary | ICD-10-CM

## 2017-08-21 DIAGNOSIS — R79.89 ELEVATED PARATHYROID HORMONE: ICD-10-CM

## 2017-08-21 DIAGNOSIS — M06.9 RHEUMATOID ARTHRITIS, INVOLVING UNSPECIFIED SITE, UNSPECIFIED RHEUMATOID FACTOR PRESENCE: ICD-10-CM

## 2017-08-21 DIAGNOSIS — M21.41 PES PLANUS OF BOTH FEET: ICD-10-CM

## 2017-08-21 DIAGNOSIS — K31.84 GASTROPARESIS: ICD-10-CM

## 2017-08-21 DIAGNOSIS — M47.812 SPONDYLOSIS OF CERVICAL REGION WITHOUT MYELOPATHY OR RADICULOPATHY: ICD-10-CM

## 2017-08-21 DIAGNOSIS — M06.9 RHEUMATOID ARTHRITIS INVOLVING MULTIPLE SITES, UNSPECIFIED RHEUMATOID FACTOR PRESENCE: ICD-10-CM

## 2017-08-21 DIAGNOSIS — M25.511 ACUTE PAIN OF RIGHT SHOULDER: Primary | ICD-10-CM

## 2017-08-21 DIAGNOSIS — R09.81 SINUS CONGESTION: ICD-10-CM

## 2017-08-21 DIAGNOSIS — D84.9 IMMUNOSUPPRESSED STATUS: ICD-10-CM

## 2017-08-21 DIAGNOSIS — M15.9 PRIMARY OSTEOARTHRITIS INVOLVING MULTIPLE JOINTS: ICD-10-CM

## 2017-08-21 DIAGNOSIS — M21.42 PES PLANUS OF BOTH FEET: ICD-10-CM

## 2017-08-21 DIAGNOSIS — M85.80 OSTEOPENIA, UNSPECIFIED LOCATION: ICD-10-CM

## 2017-08-21 DIAGNOSIS — M47.816 LUMBAR SPONDYLOSIS: ICD-10-CM

## 2017-08-21 PROCEDURE — 99999 PR PBB SHADOW E&M-EST. PATIENT-LVL IV: CPT | Mod: PBBFAC,,, | Performed by: INTERNAL MEDICINE

## 2017-08-21 PROCEDURE — 99213 OFFICE O/P EST LOW 20 MIN: CPT | Mod: S$GLB,,, | Performed by: PODIATRIST

## 2017-08-21 PROCEDURE — 99999 PR PBB SHADOW E&M-EST. PATIENT-LVL III: CPT | Mod: PBBFAC,,, | Performed by: PODIATRIST

## 2017-08-21 PROCEDURE — 99214 OFFICE O/P EST MOD 30 MIN: CPT | Mod: 25,S$GLB,, | Performed by: INTERNAL MEDICINE

## 2017-08-21 PROCEDURE — 3008F BODY MASS INDEX DOCD: CPT | Mod: S$GLB,,, | Performed by: INTERNAL MEDICINE

## 2017-08-21 PROCEDURE — 99213 OFFICE O/P EST LOW 20 MIN: CPT | Mod: PBBFAC | Performed by: PODIATRIST

## 2017-08-21 PROCEDURE — 20610 DRAIN/INJ JOINT/BURSA W/O US: CPT | Mod: RT,S$GLB,, | Performed by: INTERNAL MEDICINE

## 2017-08-21 RX ORDER — LEFLUNOMIDE 20 MG/1
20 TABLET ORAL DAILY
Qty: 30 TABLET | Refills: 2 | Status: SHIPPED | OUTPATIENT
Start: 2017-08-21 | End: 2017-09-08 | Stop reason: SDUPTHER

## 2017-08-21 RX ORDER — TRIAMCINOLONE ACETONIDE 40 MG/ML
40 INJECTION, SUSPENSION INTRA-ARTICULAR; INTRAMUSCULAR
Status: DISCONTINUED | OUTPATIENT
Start: 2017-08-21 | End: 2017-08-21 | Stop reason: HOSPADM

## 2017-08-21 RX ORDER — NAPROXEN 500 MG/1
500 TABLET ORAL 2 TIMES DAILY
Qty: 180 TABLET | Refills: 1 | Status: SHIPPED | OUTPATIENT
Start: 2017-08-21 | End: 2018-02-16 | Stop reason: SDUPTHER

## 2017-08-21 RX ORDER — PREDNISONE 5 MG/1
5 TABLET ORAL DAILY
Qty: 90 TABLET | Refills: 1 | Status: SHIPPED | OUTPATIENT
Start: 2017-08-21 | End: 2018-05-21

## 2017-08-21 RX ADMIN — TRIAMCINOLONE ACETONIDE 40 MG: 40 INJECTION, SUSPENSION INTRA-ARTICULAR; INTRAMUSCULAR at 01:08

## 2017-08-21 ASSESSMENT — DISEASE ACTIVITY SCORE (DAS28)
CRP_MG_PER_LITER: 1.8
GLOBAL_HEALTH_SCORE: 80
TOTAL_SCORE_CRP: 3.73
TENDER_JOINTS_COUNT: 2
ESR_MM_PER_HR: 17
TOTAL_SCORE_ESR: 4.38
SWOLLEN_JOINTS_COUNT: 3

## 2017-08-21 ASSESSMENT — ROUTINE ASSESSMENT OF PATIENT INDEX DATA (RAPID3)
FATIGUE SCORE: 4
MDHAQ FUNCTION SCORE: 2
PSYCHOLOGICAL DISTRESS SCORE: 2.2
AM STIFFNESS SCORE: 0, NO
TOTAL RAPID3 SCORE: 7.72
PATIENT GLOBAL ASSESSMENT SCORE: 8
PAIN SCORE: 8.5

## 2017-08-21 NOTE — TELEPHONE ENCOUNTER
Spoke with patient.  I told her I would put in a call to Mayelin, research coordinator for the Cisapride study, to let her know patient is out of medication and will be here today to pick it up.

## 2017-08-21 NOTE — PROCEDURES
Large Joint Aspiration/Injection  Date/Time: 8/21/2017 1:28 PM  Performed by: KADIE MONET  Authorized by: KADIE MONET     Consent Done?:  Yes (Verbal)  Indications:  Pain  Procedure site marked: Yes    Timeout: Prior to procedure the correct patient, procedure, and site was verified      Location:  Shoulder  Site:  R glenohumeral  Prep: Patient was prepped and draped in usual sterile fashion    Ultrasonic Guidance for needle placement: No  Needle size:  25 G  Approach:  Posterior  Medications:  40 mg triamcinolone acetonide 40 mg/mL  Aspirate amount (ml):  0  Patient tolerance:  Patient tolerated the procedure well with no immediate complications

## 2017-08-21 NOTE — PROGRESS NOTES
"CC:   Foot pain       HPI:       Joyce Peterson is a 56 y.o. female who presents to clinic complaining of right foot pain  She has history of Rheumatoid Arthritis and osteoporosis.   She reports right foot pain at the lateral forefoot aspect for about 2 weeks without apparent trauma to the are.  She reports that she saw Dr. Steele last week and was given a CAM boot to wear.   She states that the boot is uncomfortable and does not seem to improve the pain at this time.  She reports no fevers or chills.  She will being going on a trip for a few weeks in October and wants to make sure "it's not serious".      8/21/17:  follow up RIGHT foot pain.  MRI shows stress fracture of 3rd metatarsal.  She has been in a Darco stiff sole shoe.  The fracture/CAM boot is uncomfortable to wear.  She reports improvement in pain but wants to make sure there's healing before her trip.   She has history of multiple fractures in her foot.  She has had a bone stimulator in the past due to delayed bone healing.            Past Medical History:   Diagnosis Date    Acid reflux     Allergy     Anemia     Asthma     Coronary artery disease     Degenerative disc disease     Dry eyes     Dry mouth     Gastroparesis     Hyperlipidemia     Lateral meniscus derangement 4/6/2016    Osteoarthritis     Osteoporosis     Rheumatoid arthritis(714.0)     Umbilical hernia 8/13/2015         Current Outpatient Prescriptions on File Prior to Visit   Medication Sig Dispense Refill    albuterol 90 mcg/actuation inhaler Inhale 2 puffs into the lungs every 6 (six) hours as needed for Wheezing. 18 g 11    aspirin 81 mg Tab Take 81 mg by mouth every morning.       azelastine (ASTELIN) 137 mcg nasal spray 1 spray (137 mcg total) by Nasal route 2 (two) times daily. (Patient taking differently: 1 spray by Nasal route 2 (two) times daily as needed. ) 30 mL 11    budesonide-formoterol 160-4.5 mcg (SYMBICORT) 160-4.5 mcg/actuation HFAA Inhale 2 " puffs into the lungs every 4 to 6 hours as needed. (Patient taking differently: Inhale 2 puffs into the lungs every 12 (twelve) hours. ) 1 Inhaler 12    calcium citrate-vitamin D (CITRACAL + D) 315-200 mg-unit per tablet Take 1 tablet by mouth 2 (two) times daily.       CYCLOSPORINE (RESTASIS OPHT) Inject 0.05 % into the eye. 1 Dropperette Both eyes Twice a day .  dispense one month supply/sixty-four vials/ two trays      fish oil-omega-3 fatty acids 300-1,000 mg capsule Take 1,400 g by mouth once daily.      flaxseed oil 1,000 mg Cap Take by mouth once daily.      GUAIFENESIN (MUCINEX ORAL) Take 400 mg by mouth every 4 (four) hours as needed.       INV PROPULSID 10 MG Take 20 mg by mouth 4 (four) times daily as directed by physician.  For investigational use only 480 each 2    leflunomide (ARAVA) 20 MG Tab Take 1 tablet (20 mg total) by mouth once daily. 30 tablet 2    methylPREDNISolone (MEDROL DOSEPACK) 4 mg tablet use as directed 1 Package 0    montelukast (SINGULAIR) 10 mg tablet Take 1 tablet (10 mg total) by mouth nightly. 30 tablet 2    naproxen (NAPROSYN) 500 MG tablet TAKE 1 TABLET TWICE A DAY 60 tablet 1    omeprazole (PRILOSEC) 40 MG capsule TAKE ONE CAPSULE BY MOUTH EVERY MORNING 30 capsule 6    omeprazole-sodium bicarbonate (ZEGERID) 40-1.1 mg-gram per capsule Take 1 capsule by mouth every morning. 30 capsule 11    POTASSIUM ORAL Take by mouth once daily.      predniSONE (DELTASONE) 5 MG tablet Take 1 tablet (5 mg total) by mouth once daily. 30 tablet 3    predniSONE (DELTASONE) 5 MG tablet TAKE 1 & 1/2 TABS EVERY DAY 45 tablet 1    PREMARIN 0.625 mg tablet Take 0.625 mg by mouth once daily.  11    progesterone (PROMETRIUM) 100 MG capsule Take 100 mg by mouth every morning. 1 Capsule Oral Every day, morning      RESTASIS 0.05 % ophthalmic emulsion       rizatriptan (MAXALT) 10 MG tablet Take 10 mg by mouth as needed for Migraine.       rosuvastatin (CRESTOR) 20 MG tablet TAKE 1  "TABLET EVERY DAY 90 tablet 3    sucralfate (CARAFATE) 1 gram tablet Take 1 tablet (1 g total) by mouth 4 (four) times daily. 120 tablet 6    tofacitinib (XELJANZ XR) 11 mg Tb24 Take 1 tablet by mouth once daily. 30 tablet 1    tramadol (ULTRAM) 50 mg tablet TAKE 1 TABLET DAILY AS NEEDED FOR PAIN 30 tablet 0    VESICARE 10 mg tablet Take 1 tablet by mouth every morning.       vitamin E 1000 UNIT capsule Take 1,000 Units by mouth once daily.      [] montelukast (SINGULAIR) 10 mg tablet Take 1 tablet (10 mg total) by mouth every evening. 30 tablet 11     No current facility-administered medications on file prior to visit.            Review of patient's allergies indicates:   Allergen Reactions    Actemra [tocilizumab] Other (See Comments)     Severe dizziness    Codeine Nausea And Vomiting    Gold au 198 Hives and Rash    Hydroxychloroquine Other (See Comments)     Can't remember the reaction      Iodinated contrast media - oral and iv dye Other (See Comments)     Other reaction(s): BURNING ALL OVER    Iodine Other (See Comments)     Other reaction(s): BURNING ALL OVER - Iodine dye - Not topical    Sulfa (sulfonamide antibiotics) Other (See Comments)     Can't remember the reaction    Zofran [ondansetron hcl (pf)] Nausea And Vomiting     Pt reports that last time she received zofran she started vomiting again    Methotrexate analogues Nausea Only    Pneumovax 23 [pneumococcal 23-argenis ps vaccine] Other (See Comments)     "sick"             ROS:  General ROS: negative for  chills, fatigue or fever  Cardiovascular ROS: no chest pain or dyspnea on exertion  Musculoskeletal ROS: negative for joint pain or joint stiffness.  Negative for loss of strength.  Positive for foot pain.   Neuro ROS: Negative for syncope, numbness, or muscle weakness  Skin ROS: Negative for rash, itching or nail/hair changes.           OBJECTIVE:         Vitals:    17 1107   BP: 120/76   BP Location: Right arm   Patient " "Position: Sitting   BP Method: Medium (Automatic)   Pulse: 98   Weight: 66.7 kg (147 lb)   Height: 5' 1" (1.549 m)        Right Lower extremity exam:  Vasc: Palpable pedal pulses. Feet appropriately warm to touch. Cap refill time is within normal limits   Neurological:  Light touch, proprioception, and Sharp/dull sensation are all intact. There is no Tinel's along the tarsal tunnel.    Derm: No open lesions, macerations, or rashes.  MSK:  Tenderness to palpation near the 3rd, 4th and 5th metatarso-phalangeal joint, minimal swelling, no bruising or redness noted.  No increased temperature note.  No foot deformity.        Imagin17:   Polyarthritis consistent with RA similar to old exam. No acute abnormality.     MRI:  Stress fracture of the 3rd metatarsal.  Periarticular erosions involving the 1st interphalangeal joint and 4th and 5th MTP joints, findings corresponding to this patient's history of rheumatoid arthritis.  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        ASSESSMENT/PLAN:      I counseled the patient on her conditions, their implications and medical management.      Stress fracture of right foot, sequela  -     ORTHOTIC DEVICE (DME)  -     MRI Lower Extremity Without Contrast Right; Future; Expected date: 2017    Pes planus of both feet  -     ORTHOTIC DEVICE (DME)  -     MRI Lower Extremity Without Contrast Right; Future; Expected date: 2017    Rheumatoid arthritis, involving unspecified site, unspecified rheumatoid factor presence  -     ORTHOTIC DEVICE (DME)  -     MRI Lower Extremity Without Contrast Right; Future; Expected date: 2017      Continue Darco stiff sole shoe.  RICE.    MRI follow up to reassess healing.   Will consider bone stimulator.    I will call with MRI results. Patient is amenable to plan.    "

## 2017-08-21 NOTE — TELEPHONE ENCOUNTER
----- Message from Clarita Loza MA sent at 8/21/2017  8:52 AM CDT -----  Contact: [phone]  Oscar  Pt is in a drug study and needing to  a bottle of propulsid today, please.     She will be at [facility] today for other appt's so please call when she can come up...[phone]

## 2017-08-21 NOTE — PROGRESS NOTES
Subjective:       Patient ID: Joyce Peterson is a 56 y.o. female.    Chief Complaint: RA, osteopenia    HPI  S/p right foot metatarsal fracture, now with painless swelling left knee. CC is right shoulder pain. Completed 2 wks of fluconazole for candida esophagitis(inhaled steroids, prednisone, tofacitnimb, gastroparesis)  Review of Systems   Constitutional: Positive for fatigue. Negative for appetite change, fever and unexpected weight change.   HENT: Negative for mouth sores.         Dry mouth   Eyes: Positive for redness. Negative for visual disturbance.        Dry   Respiratory: Positive for cough. Negative for shortness of breath and wheezing.    Cardiovascular: Negative for chest pain and palpitations.   Gastrointestinal: Negative for abdominal pain, anal bleeding, blood in stool, constipation, diarrhea, nausea and vomiting.   Genitourinary: Negative for dysuria, frequency and urgency.   Musculoskeletal: Negative for arthralgias, back pain, gait problem, joint swelling, myalgias, neck pain and neck stiffness.   Skin: Negative for rash.   Neurological: Negative for weakness, numbness and headaches.   Hematological: Negative for adenopathy. Does not bruise/bleed easily.   Psychiatric/Behavioral: Negative for sleep disturbance. The patient is not nervous/anxious.          Objective:   There were no vitals taken for this visit.     Physical Exam   Constitutional: She is oriented to person, place, and time and well-developed, well-nourished, and in no distress.   HENT:   Head: Normocephalic and atraumatic.   Mouth/Throat: Oropharynx is clear and moist.   Eyes: Conjunctivae and EOM are normal.   Neck: Normal range of motion. Neck supple. No thyromegaly present.   Cardiovascular: Normal rate, regular rhythm, normal heart sounds and intact distal pulses.  Exam reveals no gallop and no friction rub.    No murmur heard.  Pulmonary/Chest: Breath sounds normal. She has no wheezes. She has no rales. She exhibits no  tenderness.   Abdominal: Soft. She exhibits no distension and no mass. There is no tenderness.       Right Side Rheumatological Exam     Examination finds the wrist and knee normal.    The patient is tender to palpation of the shoulder and elbow    She has swelling of the shoulder and elbow    The patient has an enlarged 1st PIP, 1st MCP, 2nd PIP, 2nd MCP, 3rd PIP, 3rd MCP, 4th PIP, 4th MCP, 5th PIP and 5th MCP    Left Side Rheumatological Exam     Examination finds the shoulder, elbow, wrist and 5th MCP normal.    She has swelling of the knee    The patient has an enlarged 1st PIP, 1st MCP, 2nd PIP, 2nd MCP, 3rd PIP, 3rd MCP, 4th PIP, 4th MCP and 5th PIP.      Lymphadenopathy:     She has no cervical adenopathy.   Neurological: She is alert and oriented to person, place, and time. She displays normal reflexes. Gait normal.   Nl motor strength UE and LE bilateral   Musculoskeletal: She exhibits no edema.         colonoscopy 6/29/17:       The entire examined colon appeared normal.       There was a medium-sized lipoma, in the transverse colon.  Impression:   - The entire examined colon is normal.                        - No specimens collected.  Results for TONNY GOMEZ (MRN [id number]) as of 8/21/2017 12:54   Ref. Range 8/11/2017 09:50   WBC Latest Ref Range: 3.90 - 12.70 K/uL 5.68   RBC Latest Ref Range: 4.00 - 5.40 M/uL 4.45   Hemoglobin Latest Ref Range: 12.0 - 16.0 g/dL 9.6 (L)   Hematocrit Latest Ref Range: 37.0 - 48.5 % 31.1 (L)   MCV Latest Ref Range: 82 - 98 fL 70 (L)   MCH Latest Ref Range: 27.0 - 31.0 pg 21.6 (L)   MCHC Latest Ref Range: 32.0 - 36.0 g/dL 30.9 (L)   RDW Latest Ref Range: 11.5 - 14.5 % 20.4 (H)   Platelets Latest Ref Range: 150 - 350 K/uL 395 (H)   MPV Latest Ref Range: 9.2 - 12.9 fL 9.4   Gran% Latest Ref Range: 38.0 - 73.0 % 47.0   Gran # Latest Ref Range: 1.8 - 7.7 K/uL 2.7   Lymph% Latest Ref Range: 18.0 - 48.0 % 38.0   Lymph # Latest Ref Range: 1.0 - 4.8 K/uL 2.2   Mono% Latest Ref  Range: 4.0 - 15.0 % 13.2   Mono # Latest Ref Range: 0.3 - 1.0 K/uL 0.8   Eosinophil% Latest Ref Range: 0.0 - 8.0 % 0.5   Eos # Latest Ref Range: 0.0 - 0.5 K/uL 0.0   Basophil% Latest Ref Range: 0.0 - 1.9 % 0.9   Baso # Latest Ref Range: 0.00 - 0.20 K/uL 0.05   Sed Rate Latest Ref Range: 0 - 20 mm/Hr 17   Sodium Latest Ref Range: 136 - 145 mmol/L 139   Potassium Latest Ref Range: 3.5 - 5.1 mmol/L 4.1   Chloride Latest Ref Range: 95 - 110 mmol/L 106   CO2 Latest Ref Range: 23 - 29 mmol/L 22 (L)   Anion Gap Latest Ref Range: 8 - 16 mmol/L 11   BUN, Bld Latest Ref Range: 6 - 20 mg/dL 15   Creatinine Latest Ref Range: 0.5 - 1.4 mg/dL 1.0   eGFR if non African American Latest Ref Range: >60 mL/min/1.73 m^2 >60.0   eGFR if African American Latest Ref Range: >60 mL/min/1.73 m^2 >60.0   Glucose Latest Ref Range: 70 - 110 mg/dL 84   Calcium Latest Ref Range: 8.7 - 10.5 mg/dL 8.9   Alkaline Phosphatase Latest Ref Range: 55 - 135 U/L 98   Total Protein Latest Ref Range: 6.0 - 8.4 g/dL 6.5   Albumin Latest Ref Range: 3.5 - 5.2 g/dL 3.3 (L)   Total Bilirubin Latest Ref Range: 0.1 - 1.0 mg/dL 0.2   AST Latest Ref Range: 10 - 40 U/L 17   ALT Latest Ref Range: 10 - 44 U/L 14   CRP Latest Ref Range: 0.0 - 8.2 mg/L 1.8   Vit D, 25-Hydroxy Latest Ref Range: 30 - 96 ng/mL 45   TSH Latest Ref Range: 0.400 - 4.000 uIU/mL 0.638   T3, Free Latest Ref Range: 2.3 - 4.2 pg/mL 2.6   Free T4 Latest Ref Range: 0.71 - 1.51 ng/dL 1.12   PTH Latest Ref Range: 9.0 - 77.0 pg/mL 75.0     Results for TONNY GOMEZ (MRN [id number]) as of 8/21/2017 12:54   Ref. Range 3/15/2017 14:51   Vitamin B-12 Latest Ref Range: 210 - 950 pg/mL 368   Vit D, 25-Hydroxy Latest Ref Range: 30 - 96 ng/mL 33   TSH Latest Ref Range: 0.400 - 4.000 uIU/mL 0.339 (L)   Free T4 Latest Ref Range: 0.71 - 1.51 ng/dL 1.02   PTH Latest Ref Range: 9.0 - 77.0 pg/mL 120.0 (H)     Results for TONNY GOMEZ (MRN [id number]) as of 8/21/2017 12:54   Ref. Range 8/11/2017 09:50   Free T4  Latest Ref Range: 0.71 - 1.51 ng/dL 1.12   Results for TONNY GOMEZ (MRN [id number]) as of 8/21/2017 12:54   Ref. Range 8/11/2017 09:50   TSH Latest Ref Range: 0.400 - 4.000 uIU/mL 0.638         The neck x-rays show slight progression of degenerative change. The hands show chronic changes wrists, back of hand and the index and middle finger knuckle joints right> left and the 4,5 toe joints right> left, without significant change I can see. RJQ   External Result Report     External Result Report   Narrative     4 view arthritis survey, comparison 2016.  Mild DJD anterior C1-C2.  Limited primary anterior subluxation C3 on C4, C4 on C5, C5 on C6.      Advanced erosive inflammatory arthritis MTP, first metacarpal carpal, radial carpal and carpal joints, radial ulnar joints.  DJD first PIP joints first toes, and inflammatory arthritis changes right fourth and fifth MTP joints.  Knees normal.   Impression      Changes of inflammatory, rheumatoid arthritis, See results above.      Electronically signed by: GUY LUI MD  Date: 02/07/17  Time: 16:23     Encounter     View Encounter            Results for TONNY GOMEZ (MRN [id number]) as of 8/21/2017 12:54   Ref. Range 5/8/2017 16:49   Calcium, Urine Latest Ref Range: 0.0 - 15.0 mg/dL 3.8   Calcium, 24H Urine Latest Ref Range: 4 - 12 mg/Hr 6   CA Urine (mg/Spec) Latest Units: mg/Spec 148   Urine Protein, Timed Latest Ref Range: 0 - 100 mg/Spec Unable to calculate     05/08/17 1649    Resulting lab: [facility]   Value: REVIEWED   Comment: Electronically reviewed and signed by:   Yulia Baig MD   Signed on 05/09/17 at 14:41   No monoclonal peaks identified.      03/15/17 1451    Resulting lab: [facility] [place] [facility]   Value: REVIEWED   Comment: Electronically reviewed and signed by:   Yulia Baig MD   Signed on 03/16/17 at 16:05   No discrete monoclonal peaks identified.          Assessment:   Right shoulder  "pain, glenohumeral RA  erosive RA discontinued long term methotrexate b/o nausea with po and sc and reduced dose, tolerating leflunomide and tofacitinib.Has failed 3 TNFi(infliximab, etanercept, adalimumab), abatacept, hypogamma with rituximab, dizziness with tocilizumab  Secondary Sjogren's now with punctal plugs and Restasis, seeing Jamila Armas ESR 17 CRP DAS28: 4.38 (moderate)  TJC=2 SJC=3        CRP 1.8    UWV74-NVL 3.73(moderate)  despite prednisone 5 mg james  coronary ASCVD  dilated esophagus, dysmotility, candidiasis gastroparesis and iron deficiency anemia   osteopenia < NOF FRAX therapy threshold and is on HRT, tapering down/off low dose prednisone intolerant of oral bisphosphonates. No need for denosumab or zolendronic acid for this reason, and would like to clarify role of PTH and low TSH h/o hypocalcemia which both of these agents can cause, favor T1/2 of denosumab but only after above.    Elevated iPTH with normal vitamin D, eGFR, 24 hr urine calcium ???   osteopenia with moderate fracture risk on  prednisone 5mg daily, and only a candidate for Rx b/o prednisone. FRAX MO 12% Hip 0.9%(2/16/17 Also is taking estrogen-progesterone which is protective.                 Plan:     * After verbal consent and cleansing with Chloraprep the right glenohumeral joint injected with Kenalog 40mg with 1 ml 1 % lidocaine. Patient tolerated procedure well.   Cont F/u Endocrinology for thyroid nodule ? Intermittently "hot" overproducing T4, now with normalTSH and free T4  elevated iPTH ? Cause  cont prednisone  5mg daily   Cont tofacitinib-XR 11 mg daily   Cont leflunomide 20mg daily  Cont exercise program  RTC 3 months with standing labs. If not improved, stop tofacitinib and change to certolizumab(4th anti-TNF)      "

## 2017-08-22 ENCOUNTER — PATIENT MESSAGE (OUTPATIENT)
Dept: ENDOCRINOLOGY | Facility: CLINIC | Age: 57
End: 2017-08-22

## 2017-08-23 ENCOUNTER — OFFICE VISIT (OUTPATIENT)
Dept: GASTROENTEROLOGY | Facility: CLINIC | Age: 57
End: 2017-08-23
Payer: COMMERCIAL

## 2017-08-23 ENCOUNTER — LAB VISIT (OUTPATIENT)
Dept: LAB | Facility: HOSPITAL | Age: 57
End: 2017-08-23
Attending: INTERNAL MEDICINE
Payer: COMMERCIAL

## 2017-08-23 ENCOUNTER — HOSPITAL ENCOUNTER (OUTPATIENT)
Dept: CARDIOLOGY | Facility: CLINIC | Age: 57
Discharge: HOME | End: 2017-08-23
Payer: MEDICARE

## 2017-08-23 VITALS
SYSTOLIC BLOOD PRESSURE: 135 MMHG | BODY MASS INDEX: 27.97 KG/M2 | DIASTOLIC BLOOD PRESSURE: 86 MMHG | HEIGHT: 61 IN | HEART RATE: 86 BPM | WEIGHT: 148.13 LBS

## 2017-08-23 DIAGNOSIS — D50.9 IRON DEFICIENCY ANEMIA, UNSPECIFIED IRON DEFICIENCY ANEMIA TYPE: ICD-10-CM

## 2017-08-23 DIAGNOSIS — B37.81 ESOPHAGEAL CANDIDIASIS: ICD-10-CM

## 2017-08-23 DIAGNOSIS — K22.4 ESOPHAGEAL DYSMOTILITY: ICD-10-CM

## 2017-08-23 DIAGNOSIS — K31.84 GASTROPARESIS: Primary | ICD-10-CM

## 2017-08-23 DIAGNOSIS — K31.84 GASTROPARESIS: ICD-10-CM

## 2017-08-23 DIAGNOSIS — Z00.6 RESEARCH STUDY PATIENT: ICD-10-CM

## 2017-08-23 LAB
ALBUMIN SERPL BCP-MCNC: 3.6 G/DL
ALP SERPL-CCNC: 83 U/L
ALT SERPL W/O P-5'-P-CCNC: 17 U/L
ANION GAP SERPL CALC-SCNC: 10 MMOL/L
AST SERPL-CCNC: 18 U/L
BASOPHILS # BLD AUTO: 0.02 K/UL
BASOPHILS NFR BLD: 0.3 %
BILIRUB SERPL-MCNC: 0.2 MG/DL
BUN SERPL-MCNC: 19 MG/DL
CALCIUM SERPL-MCNC: 9 MG/DL
CHLORIDE SERPL-SCNC: 106 MMOL/L
CO2 SERPL-SCNC: 22 MMOL/L
CREAT SERPL-MCNC: 1 MG/DL
DIFFERENTIAL METHOD: ABNORMAL
EOSINOPHIL # BLD AUTO: 0 K/UL
EOSINOPHIL NFR BLD: 0.2 %
ERYTHROCYTE [DISTWIDTH] IN BLOOD BY AUTOMATED COUNT: 20 %
EST. GFR  (AFRICAN AMERICAN): >60 ML/MIN/1.73 M^2
EST. GFR  (NON AFRICAN AMERICAN): >60 ML/MIN/1.73 M^2
GLUCOSE SERPL-MCNC: 101 MG/DL
HCT VFR BLD AUTO: 31.1 %
HGB BLD-MCNC: 9.7 G/DL
LYMPHOCYTES # BLD AUTO: 1.8 K/UL
LYMPHOCYTES NFR BLD: 28.5 %
MAGNESIUM SERPL-MCNC: 2 MG/DL
MCH RBC QN AUTO: 21.9 PG
MCHC RBC AUTO-ENTMCNC: 31.2 G/DL
MCV RBC AUTO: 70 FL
MONOCYTES # BLD AUTO: 0.6 K/UL
MONOCYTES NFR BLD: 10.1 %
NEUTROPHILS # BLD AUTO: 3.8 K/UL
NEUTROPHILS NFR BLD: 60.7 %
PHOSPHATE SERPL-MCNC: 3 MG/DL
PLATELET # BLD AUTO: 478 K/UL
PMV BLD AUTO: 9.4 FL
POTASSIUM SERPL-SCNC: 3.6 MMOL/L
PROT SERPL-MCNC: 7.1 G/DL
RBC # BLD AUTO: 4.42 M/UL
SODIUM SERPL-SCNC: 138 MMOL/L
WBC # BLD AUTO: 6.25 K/UL

## 2017-08-23 PROCEDURE — 36415 COLL VENOUS BLD VENIPUNCTURE: CPT

## 2017-08-23 PROCEDURE — 80053 COMPREHEN METABOLIC PANEL: CPT

## 2017-08-23 PROCEDURE — 83735 ASSAY OF MAGNESIUM: CPT

## 2017-08-23 PROCEDURE — 93010 ELECTROCARDIOGRAM REPORT: CPT | Mod: S$PBB,,, | Performed by: INTERNAL MEDICINE

## 2017-08-23 PROCEDURE — 93005 ELECTROCARDIOGRAM TRACING: CPT | Mod: PBBFAC | Performed by: INTERNAL MEDICINE

## 2017-08-23 PROCEDURE — 99213 OFFICE O/P EST LOW 20 MIN: CPT | Mod: PBBFAC,25 | Performed by: INTERNAL MEDICINE

## 2017-08-23 PROCEDURE — 99999 PR PBB SHADOW E&M-EST. PATIENT-LVL III: CPT | Mod: PBBFAC,,, | Performed by: INTERNAL MEDICINE

## 2017-08-23 PROCEDURE — 84100 ASSAY OF PHOSPHORUS: CPT

## 2017-08-23 PROCEDURE — 85025 COMPLETE CBC W/AUTO DIFF WBC: CPT

## 2017-08-23 PROCEDURE — 99215 OFFICE O/P EST HI 40 MIN: CPT | Mod: S$GLB,,, | Performed by: INTERNAL MEDICINE

## 2017-08-23 RX ORDER — FLUCONAZOLE 100 MG/1
100 TABLET ORAL DAILY
Qty: 14 TABLET | Refills: 3 | Status: SHIPPED | OUTPATIENT
Start: 2017-08-23 | End: 2018-01-11 | Stop reason: SDUPTHER

## 2017-08-23 NOTE — PROGRESS NOTES
Subjective:       Patient ID: Joyce Peterson is a 56 y.o. female.    Chief Complaint: GI Problem (Gastroparesis)    HPI  Review of Systems   Constitutional: Negative for activity change, appetite change, chills, diaphoresis, fatigue, fever and unexpected weight change.   HENT: Negative for congestion, ear pain, mouth sores, nosebleeds, postnasal drip, rhinorrhea, sinus pressure, sore throat, trouble swallowing and voice change.    Eyes: Negative for pain.   Respiratory: Negative for cough, shortness of breath and wheezing.    Cardiovascular: Negative for chest pain, palpitations and leg swelling.   Genitourinary: Negative for difficulty urinating, dysuria, flank pain, hematuria and menstrual problem.   Musculoskeletal: Positive for arthralgias, gait problem and joint swelling. Negative for back pain, myalgias and neck pain.   Skin: Negative for rash.   Neurological: Negative for dizziness, tremors, syncope, numbness and headaches.   Hematological: Negative for adenopathy. Does not bruise/bleed easily.   Psychiatric/Behavioral: Negative for agitation, behavioral problems, confusion, decreased concentration and dysphoric mood. The patient is not nervous/anxious.        Objective:      Physical Exam   Constitutional: She is oriented to person, place, and time. She appears well-developed and well-nourished. No distress.   HENT:   Head: Normocephalic and atraumatic.   Right Ear: External ear normal.   Left Ear: External ear normal.   Nose: Nose normal.   Mouth/Throat: Oropharynx is clear and moist. No oropharyngeal exudate.   Eyes: Conjunctivae are normal. Pupils are equal, round, and reactive to light. No scleral icterus.   Neck: Normal range of motion. Neck supple. No thyromegaly present.   Cardiovascular: Normal rate, regular rhythm, normal heart sounds and intact distal pulses.  Exam reveals no gallop.    No murmur heard.  Pulmonary/Chest: Effort normal and breath sounds normal. She has no wheezes. She has no  rales.   Abdominal: Soft. Normal appearance and bowel sounds are normal. She exhibits no shifting dullness, no distension, no fluid wave, no abdominal bruit and no mass. There is no hepatosplenomegaly. There is no tenderness.   Musculoskeletal: Normal range of motion. She exhibits no edema or tenderness.   Lymphadenopathy:     She has no cervical adenopathy.   Neurological: She is alert and oriented to person, place, and time. No cranial nerve deficit.   Skin: Skin is warm and dry. No rash noted.   Psychiatric: She has a normal mood and affect. Her behavior is normal. Judgment and thought content normal.       Assessment:       1. Gastroparesis    2. Esophageal dysmotility    3. Research study patient    4. Iron deficiency anemia, unspecified iron deficiency anemia type    5. Esophageal candidiasis        Plan:         This is a followup study as required by the cisapride compassionate trial.    Joyce has idiopathic gastroparesis.  She is on cisapride for that.  Right now,   she is doing well with her stomach.  She is eating small meals.  Very rarely   does she have nausea.  As long as she does eat small meals, the fullness that   she has after eating is not severe.  There is no severe abdominal pain.  No   vomiting events.  No significant reflux.  She does have some TMJ problems, which   have limited her diet, and this may be why she is doing so well with her   stomach right now.    On her last endoscopy, she did have esophageal candidiasis.  This is probably   related to steroid use related to her lungs.  She is starting to notice a   recurrence of the odynophagia right now.    ASSESSMENT AND DECISION MAKIN.  Gastroparesis.  We are going to continue the cisapride.  She has had a good   response to that.  As required for the study, I will be filling out paperwork   and coordinating care, prescribing the cisapride and filling out the forms.  2.  Esophageal dysmotility, not much of an issue right now.  3.   Research study patient.  She really does not have too many other   alternatives other than surgical procedures for the gastroparesis, which may or   may not give as good relief as she has now.  4.  Iron deficiency anemia.  We did do an EGD and colon.  They did not find any   bleeding source.  It is possible, there could be small bowel bleeding.  I   discussed video capsule, which quite frankly might be problematic to perform.    Patients with gastroparesis often have a difficult type emptying the capsule out   of the stomach with enough time left on the battery to adequately visualize the   small bowel.  For now, I have just recommended that she try to take a small   dose of oral iron and see if we can build up her blood.  It is okay to do iron   infusions, too, if she can find an oral iron she can tolerate.  5.  Esophageal candidiasis.  I think that is likely to return every time she   requires steroids.  I gave her a prescription for Diflucan today.      EB  dd: 08/23/2017 17:07:37 (CDT)  td: 08/24/2017 03:55:30 (CDT)  Doc ID   #[id number]  Job ID #[id number]    CC:

## 2017-08-25 ENCOUNTER — PATIENT MESSAGE (OUTPATIENT)
Dept: PULMONOLOGY | Facility: CLINIC | Age: 57
End: 2017-08-25

## 2017-08-25 ENCOUNTER — PATIENT MESSAGE (OUTPATIENT)
Dept: GASTROENTEROLOGY | Facility: CLINIC | Age: 57
End: 2017-08-25

## 2017-08-25 ENCOUNTER — PATIENT MESSAGE (OUTPATIENT)
Dept: RHEUMATOLOGY | Facility: CLINIC | Age: 57
End: 2017-08-25

## 2017-08-25 DIAGNOSIS — J30.89 PERENNIAL ALLERGIC RHINITIS: ICD-10-CM

## 2017-08-28 ENCOUNTER — OFFICE VISIT (OUTPATIENT)
Dept: ORTHOPEDICS | Facility: CLINIC | Age: 57
End: 2017-08-28
Payer: COMMERCIAL

## 2017-08-28 ENCOUNTER — INFUSION (OUTPATIENT)
Dept: INFECTIOUS DISEASES | Facility: HOSPITAL | Age: 57
End: 2017-08-28
Attending: INTERNAL MEDICINE
Payer: COMMERCIAL

## 2017-08-28 ENCOUNTER — PATIENT MESSAGE (OUTPATIENT)
Dept: OPTOMETRY | Facility: CLINIC | Age: 57
End: 2017-08-28

## 2017-08-28 ENCOUNTER — PATIENT MESSAGE (OUTPATIENT)
Dept: PULMONOLOGY | Facility: CLINIC | Age: 57
End: 2017-08-28

## 2017-08-28 VITALS
BODY MASS INDEX: 28.13 KG/M2 | WEIGHT: 149 LBS | HEIGHT: 61 IN | HEIGHT: 61 IN | BODY MASS INDEX: 28.13 KG/M2 | WEIGHT: 149 LBS

## 2017-08-28 DIAGNOSIS — K31.84 GASTROPARESIS: Primary | ICD-10-CM

## 2017-08-28 DIAGNOSIS — M85.80 OSTEOPENIA, UNSPECIFIED LOCATION: Primary | ICD-10-CM

## 2017-08-28 DIAGNOSIS — M84.374A STRESS FRACTURE, RIGHT FOOT, INITIAL ENCOUNTER FOR FRACTURE: ICD-10-CM

## 2017-08-28 DIAGNOSIS — M79.671 RIGHT FOOT PAIN: Primary | ICD-10-CM

## 2017-08-28 DIAGNOSIS — K31.84 GASTROPARESIS: ICD-10-CM

## 2017-08-28 DIAGNOSIS — J45.20 CHRONIC ASTHMA, MILD INTERMITTENT, UNCOMPLICATED: ICD-10-CM

## 2017-08-28 PROCEDURE — 96401 CHEMO ANTI-NEOPL SQ/IM: CPT

## 2017-08-28 PROCEDURE — 99212 OFFICE O/P EST SF 10 MIN: CPT | Mod: PBBFAC,25 | Performed by: ORTHOPAEDIC SURGERY

## 2017-08-28 PROCEDURE — 96372 THER/PROPH/DIAG INJ SC/IM: CPT

## 2017-08-28 PROCEDURE — 63600175 PHARM REV CODE 636 W HCPCS: Performed by: INTERNAL MEDICINE

## 2017-08-28 PROCEDURE — 99213 OFFICE O/P EST LOW 20 MIN: CPT | Mod: S$GLB,,, | Performed by: ORTHOPAEDIC SURGERY

## 2017-08-28 PROCEDURE — 99999 PR PBB SHADOW E&M-EST. PATIENT-LVL II: CPT | Mod: PBBFAC,,, | Performed by: ORTHOPAEDIC SURGERY

## 2017-08-28 RX ORDER — SUCRALFATE 1 G/1
1 TABLET ORAL 4 TIMES DAILY
Qty: 360 TABLET | Refills: 3 | Status: SHIPPED | OUTPATIENT
Start: 2017-08-28 | End: 2018-09-18 | Stop reason: SDUPTHER

## 2017-08-28 RX ORDER — BUDESONIDE AND FORMOTEROL FUMARATE DIHYDRATE 160; 4.5 UG/1; UG/1
2 AEROSOL RESPIRATORY (INHALATION) EVERY 12 HOURS
Qty: 3 INHALER | Refills: 3 | Status: SHIPPED | OUTPATIENT
Start: 2017-08-28 | End: 2018-09-04 | Stop reason: SDUPTHER

## 2017-08-28 RX ORDER — LEVOFLOXACIN 750 MG/1
TABLET ORAL
COMMUNITY
Start: 2017-08-03 | End: 2017-08-31

## 2017-08-28 RX ORDER — OMEPRAZOLE AND SODIUM BICARBONATE 40; 1100 MG/1; MG/1
1 CAPSULE ORAL EVERY MORNING
Qty: 90 CAPSULE | Refills: 3 | Status: SHIPPED | OUTPATIENT
Start: 2017-08-28 | End: 2018-08-15 | Stop reason: SDUPTHER

## 2017-08-28 RX ORDER — MONTELUKAST SODIUM 10 MG/1
10 TABLET ORAL NIGHTLY
Qty: 90 TABLET | Refills: 3 | Status: SHIPPED | OUTPATIENT
Start: 2017-08-28 | End: 2019-09-30 | Stop reason: SDUPTHER

## 2017-08-28 RX ADMIN — DENOSUMAB 60 MG: 60 INJECTION SUBCUTANEOUS at 03:08

## 2017-08-29 ENCOUNTER — PATIENT MESSAGE (OUTPATIENT)
Dept: RHEUMATOLOGY | Facility: CLINIC | Age: 57
End: 2017-08-29

## 2017-08-29 DIAGNOSIS — M06.9 RHEUMATOID ARTHRITIS, INVOLVING UNSPECIFIED SITE, UNSPECIFIED RHEUMATOID FACTOR PRESENCE: ICD-10-CM

## 2017-08-30 RX ORDER — PREDNISONE 1 MG/1
2 TABLET ORAL DAILY
Qty: 180 TABLET | Refills: 1 | Status: SHIPPED | OUTPATIENT
Start: 2017-08-30 | End: 2018-03-22 | Stop reason: SDUPTHER

## 2017-08-30 RX ORDER — PREDNISONE 5 MG/1
5 TABLET ORAL DAILY
Qty: 90 TABLET | Refills: 1 | Status: SHIPPED | OUTPATIENT
Start: 2017-08-30 | End: 2017-10-03 | Stop reason: SDUPTHER

## 2017-08-30 NOTE — PROGRESS NOTES
Ms. Peterson returns today for followup of her right foot pain.  This is a   56-year-old female with a history of rheumatoid arthritis who has had previous   stress fractures and presented to me four weeks ago with a two-week history of   right foot pain near the base of her metatarsals.  Although, x-ray did not show   any obvious abnormalities.  I placed her in a fracture boot and treated this as   if this was a stress fracture.  Apparently, she was seen Podiatry, subsequent to   seeing me and had an MRI done, which confirmed stress fracture.  She reports   she could not really tolerate the fracture boot, so she was placed in a postop   shoe and has been trying to keep weight off of the foot as best as possible.    She reports overall her symptoms are improved.  Physical exam reveals less   tenderness in four weeks ago.  She states she has another MRI ordered by   Podiatry.  I told her it is up to her whether she wants to do that or not, but I   think this will go on to heal.  Her concern is that she has a trip planned in   October.  I am going to go ahead and have her make a return appointment to see   me in about three weeks for followup.  She should have a repeat x-ray of her   right foot at that time.      LEVY/AZIZA  dd: 08/29/2017 13:35:30 (MORGAN)  td: 08/30/2017 04:47:53 (MORGAN)  Doc ID   #[id number]  Job ID #[id number]    CC:

## 2017-08-31 ENCOUNTER — OFFICE VISIT (OUTPATIENT)
Dept: INTERNAL MEDICINE | Facility: CLINIC | Age: 57
End: 2017-08-31
Payer: COMMERCIAL

## 2017-08-31 VITALS
HEART RATE: 95 BPM | BODY MASS INDEX: 28.3 KG/M2 | DIASTOLIC BLOOD PRESSURE: 80 MMHG | HEIGHT: 61 IN | SYSTOLIC BLOOD PRESSURE: 116 MMHG | WEIGHT: 149.88 LBS

## 2017-08-31 DIAGNOSIS — D50.9 MICROCYTIC ANEMIA: ICD-10-CM

## 2017-08-31 DIAGNOSIS — Z00.00 ROUTINE MEDICAL EXAM: Primary | ICD-10-CM

## 2017-08-31 DIAGNOSIS — E61.1 IRON DEFICIENCY: ICD-10-CM

## 2017-08-31 DIAGNOSIS — R39.15 URINARY URGENCY: ICD-10-CM

## 2017-08-31 LAB
BILIRUB UR QL STRIP: NEGATIVE
CLARITY UR REFRACT.AUTO: CLEAR
COLOR UR AUTO: COLORLESS
GLUCOSE UR QL STRIP: NEGATIVE
HGB UR QL STRIP: NEGATIVE
KETONES UR QL STRIP: NEGATIVE
LEUKOCYTE ESTERASE UR QL STRIP: NEGATIVE
NITRITE UR QL STRIP: NEGATIVE
PH UR STRIP: 6 [PH] (ref 5–8)
PROT UR QL STRIP: NEGATIVE
SP GR UR STRIP: 1 (ref 1–1.03)
URN SPEC COLLECT METH UR: ABNORMAL
UROBILINOGEN UR STRIP-ACNC: NEGATIVE EU/DL

## 2017-08-31 PROCEDURE — 87147 CULTURE TYPE IMMUNOLOGIC: CPT

## 2017-08-31 PROCEDURE — 99999 PR PBB SHADOW E&M-EST. PATIENT-LVL III: CPT | Mod: PBBFAC,,, | Performed by: INTERNAL MEDICINE

## 2017-08-31 PROCEDURE — 99396 PREV VISIT EST AGE 40-64: CPT | Mod: S$GLB,,, | Performed by: INTERNAL MEDICINE

## 2017-08-31 PROCEDURE — 81003 URINALYSIS AUTO W/O SCOPE: CPT

## 2017-08-31 PROCEDURE — 87088 URINE BACTERIA CULTURE: CPT

## 2017-08-31 PROCEDURE — 87086 URINE CULTURE/COLONY COUNT: CPT

## 2017-09-01 ENCOUNTER — HOSPITAL ENCOUNTER (OUTPATIENT)
Dept: RADIOLOGY | Facility: HOSPITAL | Age: 57
Discharge: HOME | End: 2017-09-01
Attending: ORAL & MAXILLOFACIAL SURGERY
Payer: COMMERCIAL

## 2017-09-01 ENCOUNTER — TELEPHONE (OUTPATIENT)
Dept: PHARMACY | Facility: HOSPITAL | Age: 57
End: 2017-09-01

## 2017-09-01 DIAGNOSIS — M26.69: ICD-10-CM

## 2017-09-01 PROCEDURE — 70336 MAGNETIC IMAGE JAW JOINT: CPT | Mod: 26,LT,, | Performed by: RADIOLOGY

## 2017-09-01 PROCEDURE — 70336 MAGNETIC IMAGE JAW JOINT: CPT | Mod: TC

## 2017-09-02 LAB — BACTERIA UR CULT: NORMAL

## 2017-09-03 NOTE — PROGRESS NOTES
Subjective:       Patient ID: Joyce Peterson is a 56 y.o. female.    Chief Complaint: Annual Exam    Last seen 17 months ago. Followed primarily by specialists. Here for annual exam. No acute complaints.     PMH: .  CAD, Coronary Calcium Score 123 (90th percentile) in , last Stress Echo negative .   Hyperlipidemia, TChol 203, , HDL 98, LDL 77 .   Pre-Diabetes, HbA1c 6.0%  on chronic steroids.   Rheumatoid Arthritis, long term use steroids, immune suppressants.   Osteoarthritis.   Fibromyalgia.   GERD.  Gastroparesis, Candidal Esophagitis.   Asthma.  Allergic Rhinitis.   Osteopenia.   Overactive Bladder.   Oral HSV.  H/O Migraines, subsiding.   Microcytic Anemia H/H 9., MCV 70 with no source of bleeding on recent endoscopy.   Ortho , GI , Rheum , Podiatry , Pulm , Endo , Cards .     PSH: BTL. Endometrial Ablation. Breast biopsy. Thyroid biopsy. Tonsillectomy. Cholecystectomy. Umbilical Hernia Repair. Right Knee Arthroscopy. Right Carpal Tunnel Release.     Mammogram normal . Pelvic exam today 17 outside [facility]. BMD . EGD and Colonoscopy . Eye exam regularly outside [facility]. Flu shot . Pneumovax . Prevnar . Zostavax 6/15. Tetanus . Labs : CBC stable (as above), CMP normal, Vit D 45, TSH 0.638.    Social: Non-smoker, social alcohol. , two sons live locally. Worked as a  and a bank  until becoming disabled by RA.     FMH: Lung cancer in father, emphysema in mother. Brother  age 57 with a heart attack.     Allergies: list reviewed.     Medications: list reviewed and reconciled.          Review of Systems   Constitutional: Negative for activity change, appetite change, fatigue, fever and unexpected weight change.   HENT: Negative for congestion, hearing loss, rhinorrhea, sinus pressure, sneezing, sore throat, trouble swallowing and voice change.    Eyes: Negative for pain,  "redness and visual disturbance.   Respiratory: Negative for cough, chest tightness, shortness of breath and wheezing.    Cardiovascular: Negative for chest pain, palpitations and leg swelling.   Gastrointestinal: Negative for abdominal pain, anal bleeding, blood in stool, constipation, diarrhea, nausea and vomiting.   Genitourinary: Positive for urgency. Negative for difficulty urinating, dysuria, flank pain, frequency, hematuria, vaginal bleeding and vaginal discharge.   Musculoskeletal: Positive for arthralgias. Negative for back pain, joint swelling and neck pain.   Skin: Negative for rash and wound.   Neurological: Negative for dizziness, syncope, facial asymmetry, speech difficulty, weakness, numbness and headaches.   Hematological: Negative for adenopathy. Does not bruise/bleed easily.   Psychiatric/Behavioral: Negative for confusion, decreased concentration, dysphoric mood, sleep disturbance and suicidal ideas. The patient is not nervous/anxious.        Objective:    /80, Pulse 95, Ht 5' 1", Wt 150 lbs (stable), BMI=28.  Physical Exam   Constitutional: She is oriented to person, place, and time. She appears well-developed and well-nourished. No distress.   HENT:   Head: Normocephalic and atraumatic.   Right Ear: External ear normal.   Left Ear: External ear normal.   Nose: Nose normal.   Mouth/Throat: Oropharynx is clear and moist. No oropharyngeal exudate.   Eyes: Conjunctivae and EOM are normal. Pupils are equal, round, and reactive to light. Right conjunctiva is not injected. Left conjunctiva is not injected. No scleral icterus.   Neck: Normal range of motion. Neck supple. No JVD present. Carotid bruit is not present. No thyromegaly present.   Cardiovascular: Normal rate, regular rhythm, normal heart sounds and intact distal pulses.  Exam reveals no gallop and no friction rub.    No murmur heard.  Pulmonary/Chest: Effort normal and breath sounds normal. No respiratory distress. She has no wheezes. " She has no rhonchi. She has no rales.   Abdominal: Soft. Bowel sounds are normal. She exhibits no distension and no mass. There is no hepatosplenomegaly. There is no tenderness. There is no CVA tenderness.   Musculoskeletal: Normal range of motion. She exhibits no edema, tenderness or deformity.   Lymphadenopathy:     She has no cervical adenopathy.   Neurological: She is alert and oriented to person, place, and time. She has normal strength and normal reflexes. No cranial nerve deficit. She exhibits normal muscle tone. Coordination and gait normal.   Skin: Skin is warm and dry. No lesion and no rash noted. She is not diaphoretic. No cyanosis or erythema. No pallor. Nails show no clubbing.   Psychiatric: She has a normal mood and affect. Her behavior is normal. Judgment and thought content normal.   Vitals reviewed.      Assessment:       1. Routine medical exam    2. Microcytic anemia    3. Iron deficiency    4. Urinary urgency        Plan:       Routine medical exam  -     Urinalysis    Microcytic anemia  -     Ambulatory Referral to Hematology / Oncology    Iron deficiency - intolerant of oral iron.  -     Ambulatory Referral to Hematology / Oncology    Urinary urgency  -     Urine culture    Follow up with specialists as scheduled for all other chronic conditions.   Flu shot when available.

## 2017-09-05 ENCOUNTER — PATIENT MESSAGE (OUTPATIENT)
Dept: INTERNAL MEDICINE | Facility: CLINIC | Age: 57
End: 2017-09-05

## 2017-09-05 DIAGNOSIS — N39.0 URINARY TRACT INFECTION WITHOUT HEMATURIA, SITE UNSPECIFIED: Primary | ICD-10-CM

## 2017-09-05 RX ORDER — AMOXICILLIN AND CLAVULANATE POTASSIUM 500; 125 MG/1; MG/1
1 TABLET, FILM COATED ORAL 2 TIMES DAILY
Qty: 10 TABLET | Refills: 0 | Status: SHIPPED | OUTPATIENT
Start: 2017-09-05 | End: 2017-09-10

## 2017-09-07 ENCOUNTER — PATIENT MESSAGE (OUTPATIENT)
Dept: INTERNAL MEDICINE | Facility: CLINIC | Age: 57
End: 2017-09-07

## 2017-09-08 ENCOUNTER — PATIENT MESSAGE (OUTPATIENT)
Dept: RHEUMATOLOGY | Facility: CLINIC | Age: 57
End: 2017-09-08

## 2017-09-08 DIAGNOSIS — M06.9 RHEUMATOID ARTHRITIS, INVOLVING UNSPECIFIED SITE, UNSPECIFIED RHEUMATOID FACTOR PRESENCE: ICD-10-CM

## 2017-09-08 RX ORDER — LEFLUNOMIDE 20 MG/1
20 TABLET ORAL DAILY
Qty: 30 TABLET | Refills: 1 | Status: SHIPPED | OUTPATIENT
Start: 2017-09-08 | End: 2017-09-12 | Stop reason: SDUPTHER

## 2017-09-11 ENCOUNTER — HOSPITAL ENCOUNTER (OUTPATIENT)
Dept: RADIOLOGY | Facility: HOSPITAL | Age: 57
Discharge: HOME | End: 2017-09-11
Attending: PODIATRIST
Payer: COMMERCIAL

## 2017-09-11 ENCOUNTER — TELEPHONE (OUTPATIENT)
Dept: HEMATOLOGY/ONCOLOGY | Facility: CLINIC | Age: 57
End: 2017-09-11

## 2017-09-11 ENCOUNTER — PATIENT MESSAGE (OUTPATIENT)
Dept: ADMINISTRATIVE | Facility: OTHER | Age: 57
End: 2017-09-11

## 2017-09-11 DIAGNOSIS — M84.374S STRESS FRACTURE OF RIGHT FOOT, SEQUELA: ICD-10-CM

## 2017-09-11 DIAGNOSIS — M21.41 PES PLANUS OF BOTH FEET: ICD-10-CM

## 2017-09-11 DIAGNOSIS — M06.9 RHEUMATOID ARTHRITIS, INVOLVING UNSPECIFIED SITE, UNSPECIFIED RHEUMATOID FACTOR PRESENCE: ICD-10-CM

## 2017-09-11 DIAGNOSIS — M21.42 PES PLANUS OF BOTH FEET: ICD-10-CM

## 2017-09-12 DIAGNOSIS — M06.9 RHEUMATOID ARTHRITIS, INVOLVING UNSPECIFIED SITE, UNSPECIFIED RHEUMATOID FACTOR PRESENCE: ICD-10-CM

## 2017-09-12 RX ORDER — LEFLUNOMIDE 20 MG/1
20 TABLET ORAL DAILY
Qty: 90 TABLET | Refills: 0 | Status: SHIPPED | OUTPATIENT
Start: 2017-09-12 | End: 2018-01-24 | Stop reason: SDUPTHER

## 2017-09-13 ENCOUNTER — LAB VISIT (OUTPATIENT)
Dept: LAB | Facility: HOSPITAL | Age: 57
End: 2017-09-13
Attending: INTERNAL MEDICINE
Payer: COMMERCIAL

## 2017-09-13 ENCOUNTER — PATIENT MESSAGE (OUTPATIENT)
Dept: PODIATRY | Facility: CLINIC | Age: 57
End: 2017-09-13

## 2017-09-13 ENCOUNTER — OFFICE VISIT (OUTPATIENT)
Dept: INFECTIOUS DISEASES | Facility: CLINIC | Age: 57
End: 2017-09-13
Payer: COMMERCIAL

## 2017-09-13 VITALS
HEIGHT: 61 IN | DIASTOLIC BLOOD PRESSURE: 77 MMHG | TEMPERATURE: 99 F | WEIGHT: 151 LBS | BODY MASS INDEX: 28.51 KG/M2 | HEART RATE: 105 BPM | SYSTOLIC BLOOD PRESSURE: 113 MMHG

## 2017-09-13 DIAGNOSIS — Z23 IMMUNIZATION DUE: ICD-10-CM

## 2017-09-13 DIAGNOSIS — Z71.84 TRAVEL ADVICE ENCOUNTER: Primary | ICD-10-CM

## 2017-09-13 DIAGNOSIS — Z71.84 TRAVEL ADVICE ENCOUNTER: ICD-10-CM

## 2017-09-13 PROCEDURE — 99999 PR PBB SHADOW E&M-EST. PATIENT-LVL III: CPT | Mod: PBBFAC,,, | Performed by: INTERNAL MEDICINE

## 2017-09-13 PROCEDURE — 86735 MUMPS ANTIBODY: CPT

## 2017-09-13 PROCEDURE — 36415 COLL VENOUS BLD VENIPUNCTURE: CPT

## 2017-09-13 PROCEDURE — 86762 RUBELLA ANTIBODY: CPT

## 2017-09-13 PROCEDURE — 86765 RUBEOLA ANTIBODY: CPT

## 2017-09-13 PROCEDURE — 99402 PREV MED CNSL INDIV APPRX 30: CPT | Mod: S$GLB,,, | Performed by: INTERNAL MEDICINE

## 2017-09-13 PROCEDURE — 99213 OFFICE O/P EST LOW 20 MIN: CPT | Mod: PBBFAC,25 | Performed by: INTERNAL MEDICINE

## 2017-09-13 PROCEDURE — G0008 ADMIN INFLUENZA VIRUS VAC: HCPCS | Mod: PBBFAC

## 2017-09-13 RX ORDER — AMPICILLIN 500 MG/1
500 CAPSULE ORAL EVERY 6 HOURS
Refills: 2 | COMMUNITY
Start: 2017-09-06 | End: 2017-09-13

## 2017-09-13 NOTE — PROGRESS NOTES
Subjective:      Chief Complaint:   Chief Complaint   Patient presents with    Travel Consult     Yovana, Larry, Elissa, [place], [place]      History of Present Illness    Patient  56 y.o. female who presents today for routine pretravel consultation.  The patient reports a past medical history of rheumatoid arthritis, asthma, coronary artery disease, GERD.  The patient reports the following medication allergies; sulfa (hives), codeine, contrast dye, methotrexate, plaquenil.  The patient reports the following food allergies; none.  The patient will be traveling to [place] and [place] on October 5.  The patient will be at this destination for 4 weeks.  The patient will be lodging at a hotels and on a river cruise ship.  The patient has travelled to the following other countries in the past;  countries.  The patient reports that they are unsure about all their childhood vaccinations.  The patient reports receipt of the following travel related vaccinations; none.  The purpose of this trip is for vacation.    ROS    Objective:   Physical Exam   Assessment:     Pre-Travel clinic assessment    Plan:   Patient specific risks:      She has a history of rheumatoid arthritis and is on immunosuppression.  She is aware that she is at a slightly higher risk of infections as a result.    Destination specific risks:      -Infectious Disease risks:       Mosquito Borne pathogens:  Reviewed basic mosquito avoidance precautions including wearing long sleeve clothing and insect repellant.  Patient will not be traveling to any areas of high risk for tick borne encephalitis.     Food Borne pathogens:  Reviewed basic hand, food and water sanitation precautions.  Patient instructed to take hand  on their trip.  She has been vaccinated for hepatitis A previously.       Blood Borne Pathogens:  She has been vaccinated for hepatitis b previously.     Routine:  She received Tdap in 2012.  Will order influenza  vaccine for today.    -Environmental risks:     Precautions to minimize risk/exposure to crime and motor vehicle accidents were reviewed with the patient.      -Other:       Patient doesn't recall her childhood vaccines.  Will check serologies to measles, mumps and rubella to confirm immunity.

## 2017-09-14 ENCOUNTER — TELEPHONE (OUTPATIENT)
Dept: PHARMACY | Facility: CLINIC | Age: 57
End: 2017-09-14

## 2017-09-14 ENCOUNTER — OFFICE VISIT (OUTPATIENT)
Dept: ORTHOPEDICS | Facility: CLINIC | Age: 57
End: 2017-09-14
Payer: COMMERCIAL

## 2017-09-14 ENCOUNTER — HOSPITAL ENCOUNTER (OUTPATIENT)
Dept: RADIOLOGY | Facility: HOSPITAL | Age: 57
Discharge: HOME | End: 2017-09-14
Attending: ORTHOPAEDIC SURGERY
Payer: COMMERCIAL

## 2017-09-14 ENCOUNTER — PATIENT MESSAGE (OUTPATIENT)
Dept: INFECTIOUS DISEASES | Facility: CLINIC | Age: 57
End: 2017-09-14

## 2017-09-14 VITALS — WEIGHT: 149 LBS | HEIGHT: 61 IN | BODY MASS INDEX: 28.13 KG/M2

## 2017-09-14 DIAGNOSIS — M84.374A STRESS FRACTURE, RIGHT FOOT, INITIAL ENCOUNTER FOR FRACTURE: ICD-10-CM

## 2017-09-14 DIAGNOSIS — M79.671 RIGHT FOOT PAIN: Primary | ICD-10-CM

## 2017-09-14 DIAGNOSIS — M79.671 RIGHT FOOT PAIN: ICD-10-CM

## 2017-09-14 LAB
MUMPS IGG INTERPRETATION: POSITIVE
MUMPS IGG SCREEN: 2.74 ISR
RUBEOLA IGG ANTIBODY: 2.03 ISR
RUBEOLA INTERPRETATION: POSITIVE
RUBV IGG SER-ACNC: 194 IU/ML
RUBV IGG SER-IMP: REACTIVE

## 2017-09-14 PROCEDURE — 73630 X-RAY EXAM OF FOOT: CPT | Mod: 26,RT,, | Performed by: RADIOLOGY

## 2017-09-14 PROCEDURE — 99213 OFFICE O/P EST LOW 20 MIN: CPT | Mod: S$GLB,,, | Performed by: ORTHOPAEDIC SURGERY

## 2017-09-14 PROCEDURE — 73630 X-RAY EXAM OF FOOT: CPT | Mod: TC,RT

## 2017-09-14 PROCEDURE — 99999 PR PBB SHADOW E&M-EST. PATIENT-LVL II: CPT | Mod: PBBFAC,,, | Performed by: ORTHOPAEDIC SURGERY

## 2017-09-14 PROCEDURE — 99212 OFFICE O/P EST SF 10 MIN: CPT | Mod: PBBFAC,25 | Performed by: ORTHOPAEDIC SURGERY

## 2017-09-14 RX ORDER — TRAMADOL HYDROCHLORIDE 50 MG/1
TABLET ORAL
Qty: 30 TABLET | Refills: 0 | Status: SHIPPED | OUTPATIENT
Start: 2017-09-14 | End: 2018-10-31

## 2017-09-14 NOTE — TELEPHONE ENCOUNTER
Patient was able to get a vacation override for her Xeljanz XR, but not the Leflunomide. Ins told her that she can't get it filled as a 90 day supply with us. We will fill the Xeljanz Xr #90 and Leflunomide #30 today for her to . She would like us to try to refill the Leflunomide again before she leaves to go out of town, and will call her insurance back if needed.

## 2017-09-15 NOTE — PROGRESS NOTES
HISTORY OF PRESENT ILLNESS:  Ms. Peterson returns today.  It has been about   eight weeks since she developed pain in her right foot.  She has a history of   previous stress fractures.  She has rheumatoid arthritis.  She is on steroids.    I first saw her on July 27th, which was a couple of weeks after the onset of her   symptoms.  An x-ray at that time did not show any obvious abnormalities, but   because of her history, I recommended treating her as if she had a stress   fracture.  Subsequent to seeing me, she ended up seeing Podiatry and had an MRI   done, which confirmed there was some increased signal in the third metatarsal   consistent with a stress fracture and subsequent to that, she had a second MRI   done couple of days ago, which revealed continued increased signal, but   improvement from the previous MRI.  Symptomatically, she reports she is doing   better.  She has been walking in a postop shoe without any pain.  She has a   little bit of swelling anterior to her ankle, but she does not have any pain in   this area.  Examination today reveals less tenderness than previous.  There is   no swelling in the foot.  There is minimal swelling about the ankle.  I ordered   an x-ray of her foot today and there is indeed an obvious stress fracture line   at this point with abundant callus formation.  I explained to her that this   means that her fracture is healing.  At this point, I let her wean out of the   postop shoe into supportive tennis shoe as the pain allows.  She has a   prescription from the podiatrist's to get orthotics, which I think is a good   idea.  I gave her the name of some over-the-counter orthotics to get in the   meantime.  I am going to have her return to see me as needed.      LEVY/AZIZA  dd: 09/14/2017 14:07:53 (CDT)  td: 09/15/2017 01:56:56 (CDMOSHE)  Doc ID   #[id number]  Job ID #[id number]    CC:

## 2017-09-20 ENCOUNTER — TELEPHONE (OUTPATIENT)
Dept: GASTROENTEROLOGY | Facility: CLINIC | Age: 57
End: 2017-09-20

## 2017-09-20 ENCOUNTER — PATIENT MESSAGE (OUTPATIENT)
Dept: PODIATRY | Facility: CLINIC | Age: 57
End: 2017-09-20

## 2017-09-20 DIAGNOSIS — K31.84 GASTROPARESIS: ICD-10-CM

## 2017-09-20 NOTE — TELEPHONE ENCOUNTER
Asked to refill Propulsid for Dr. Moore since he is out today.  Refill sent to [facility] pharmacy

## 2017-09-21 ENCOUNTER — PATIENT MESSAGE (OUTPATIENT)
Dept: PODIATRY | Facility: CLINIC | Age: 57
End: 2017-09-21

## 2017-09-21 ENCOUNTER — TELEPHONE (OUTPATIENT)
Dept: PODIATRY | Facility: CLINIC | Age: 57
End: 2017-09-21

## 2017-09-21 ENCOUNTER — HOSPITAL ENCOUNTER (OUTPATIENT)
Dept: RADIOLOGY | Facility: HOSPITAL | Age: 57
Discharge: HOME | End: 2017-09-21
Attending: PODIATRIST
Payer: COMMERCIAL

## 2017-09-21 ENCOUNTER — TELEPHONE (OUTPATIENT)
Dept: HEMATOLOGY/ONCOLOGY | Facility: CLINIC | Age: 57
End: 2017-09-21

## 2017-09-21 ENCOUNTER — OFFICE VISIT (OUTPATIENT)
Dept: PODIATRY | Facility: CLINIC | Age: 57
End: 2017-09-21
Payer: COMMERCIAL

## 2017-09-21 VITALS — HEART RATE: 89 BPM | SYSTOLIC BLOOD PRESSURE: 104 MMHG | DIASTOLIC BLOOD PRESSURE: 70 MMHG | HEIGHT: 61 IN

## 2017-09-21 DIAGNOSIS — M79.671 RIGHT FOOT PAIN: ICD-10-CM

## 2017-09-21 DIAGNOSIS — M06.9 RHEUMATOID ARTHRITIS, INVOLVING UNSPECIFIED SITE, UNSPECIFIED RHEUMATOID FACTOR PRESENCE: ICD-10-CM

## 2017-09-21 DIAGNOSIS — S92.344A CLOSED NONDISPLACED FRACTURE OF FOURTH METATARSAL BONE OF RIGHT FOOT, INITIAL ENCOUNTER: Primary | ICD-10-CM

## 2017-09-21 PROCEDURE — 99213 OFFICE O/P EST LOW 20 MIN: CPT | Mod: S$GLB,,, | Performed by: PODIATRIST

## 2017-09-21 PROCEDURE — 99999 PR PBB SHADOW E&M-EST. PATIENT-LVL III: CPT | Mod: PBBFAC,,, | Performed by: PODIATRIST

## 2017-09-21 PROCEDURE — 73630 X-RAY EXAM OF FOOT: CPT | Mod: 26,RT,, | Performed by: RADIOLOGY

## 2017-09-21 PROCEDURE — 99213 OFFICE O/P EST LOW 20 MIN: CPT | Mod: PBBFAC,25,PO | Performed by: PODIATRIST

## 2017-09-21 PROCEDURE — 73630 X-RAY EXAM OF FOOT: CPT | Mod: TC,PO,RT

## 2017-09-21 NOTE — PROGRESS NOTES
"CC:   Foot pain       HPI:       Joyce Peterson is a 56 y.o. female who presents to clinic complaining of right foot pain  She has history of Rheumatoid Arthritis and osteoporosis.   She reports right foot pain at the lateral forefoot aspect for about 2 weeks without apparent trauma to the are.  She reports that she saw Dr. Steele last week and was given a CAM boot to wear.   She states that the boot is uncomfortable and does not seem to improve the pain at this time.  She reports no fevers or chills.  She will being going on a trip for a few weeks in October and wants to make sure "it's not serious".      8/21/17:  follow up RIGHT foot pain.  MRI shows stress fracture of 3rd metatarsal.  She has been in a Darco stiff sole shoe.  The fracture/CAM boot is uncomfortable to wear.  She reports improvement in pain but wants to make sure there's healing before her trip.   She has history of multiple fractures in her foot.  She has had a bone stimulator in the past due to delayed bone healing.      9/21/17:  Flare up of pain and swelling right foot at the 4th metatarso-phalangeal joint area since yesterday without apparent trauma.    She started wearing her stiff sole shoe again to stabilize the area.          Past Medical History:   Diagnosis Date    Acid reflux     Allergy     Anemia     Asthma     Coronary artery disease     Degenerative disc disease     Dry eyes     Dry mouth     Gastroparesis     Hyperlipidemia     Lateral meniscus derangement 4/6/2016    Osteoarthritis     Osteoporosis     Rheumatoid arthritis(714.0)     Umbilical hernia 8/13/2015         Current Outpatient Prescriptions on File Prior to Visit   Medication Sig Dispense Refill    albuterol 90 mcg/actuation inhaler Inhale 2 puffs into the lungs every 6 (six) hours as needed for Wheezing. 18 g 11    aspirin 81 mg Tab Take 81 mg by mouth every morning.       azelastine (ASTELIN) 137 mcg nasal spray 1 spray (137 mcg total) by Nasal " route 2 (two) times daily. (Patient taking differently: 1 spray by Nasal route 2 (two) times daily as needed. ) 30 mL 11    budesonide-formoterol 160-4.5 mcg (SYMBICORT) 160-4.5 mcg/actuation HFAA Inhale 2 puffs into the lungs every 12 (twelve) hours. 3 Inhaler 3    calcium citrate-vitamin D (CITRACAL + D) 315-200 mg-unit per tablet Take 1 tablet by mouth 2 (two) times daily.       CYCLOSPORINE (RESTASIS OPHT) Inject 0.05 % into the eye. 1 Dropperette Both eyes Twice a day .  dispense one month supply/sixty-four vials/ two trays      fish oil-omega-3 fatty acids 300-1,000 mg capsule Take 1,400 g by mouth once daily.      flaxseed oil 1,000 mg Cap Take by mouth once daily.      GUAIFENESIN (MUCINEX ORAL) Take 400 mg by mouth every 4 (four) hours as needed.       INV PROPULSID 10 MG Take 20 mg by mouth 4 (four) times daily as directed by physician.  For investigational use only 480 each 2    leflunomide (ARAVA) 20 MG Tab Take 1 tablet (20 mg total) by mouth once daily. 90 tablet 0    montelukast (SINGULAIR) 10 mg tablet Take 1 tablet (10 mg total) by mouth nightly. 90 tablet 3    naproxen (NAPROSYN) 500 MG tablet Take 1 tablet (500 mg total) by mouth 2 (two) times daily. 180 tablet 1    omeprazole (PRILOSEC) 40 MG capsule TAKE ONE CAPSULE BY MOUTH EVERY MORNING 30 capsule 6    omeprazole-sodium bicarbonate (ZEGERID) 40-1.1 mg-gram per capsule Take 1 capsule by mouth every morning. 90 capsule 3    POTASSIUM ORAL Take by mouth once daily.      predniSONE (DELTASONE) 5 MG tablet Take 1 tablet (5 mg total) by mouth once daily. 90 tablet 1    predniSONE (DELTASONE) 5 MG tablet Take 1 tablet (5 mg total) by mouth once daily. 90 tablet 1    PREMARIN 0.625 mg tablet Take 0.625 mg by mouth once daily.  11    progesterone (PROMETRIUM) 100 MG capsule Take 100 mg by mouth every morning. 1 Capsule Oral Every day, morning      RESTASIS 0.05 % ophthalmic emulsion       rizatriptan (MAXALT) 10 MG tablet Take 10 mg  "by mouth as needed for Migraine.       rosuvastatin (CRESTOR) 20 MG tablet TAKE 1 TABLET EVERY DAY 90 tablet 3    sucralfate (CARAFATE) 1 gram tablet Take 1 tablet (1 g total) by mouth 4 (four) times daily. 360 tablet 3    tofacitinib (XELJANZ XR) 11 mg Tb24 Take 1 tablet by mouth once daily. 90 tablet 0    tramadol (ULTRAM) 50 mg tablet TAKE 1 TABLET DAILY AS NEEDED PAIN 30 tablet 0    VESICARE 10 mg tablet Take 1 tablet by mouth every morning.        No current facility-administered medications on file prior to visit.            Review of patient's allergies indicates:   Allergen Reactions    Actemra [tocilizumab] Other (See Comments)     Severe dizziness    Codeine Nausea And Vomiting    Gold au 198 Hives and Rash    Hydroxychloroquine Other (See Comments)     Can't remember the reaction      Iodinated contrast media - oral and iv dye Other (See Comments)     Other reaction(s): BURNING ALL OVER    Iodine Other (See Comments)     Other reaction(s): BURNING ALL OVER - Iodine dye - Not topical    Sulfa (sulfonamide antibiotics) Other (See Comments)     Can't remember the reaction    Zofran [ondansetron hcl (pf)] Nausea And Vomiting     Pt reports that last time she received zofran she started vomiting again    Methotrexate analogues Nausea Only    Pneumovax 23 [pneumococcal 23-argenis ps vaccine] Other (See Comments)     "sick"             ROS:  General ROS: negative for  chills, fatigue or fever  Cardiovascular ROS: no chest pain or dyspnea on exertion  Musculoskeletal ROS: negative for joint pain or joint stiffness.  Negative for loss of strength.  Positive for foot pain.   Neuro ROS: Negative for syncope, numbness, or muscle weakness  Skin ROS: Negative for rash, itching or nail/hair changes.           OBJECTIVE:         Vitals:    09/21/17 1359   BP: 104/70   Pulse: 89   Height: 5' 1" (1.549 m)        Right Lower extremity exam:  Vasc: Palpable pedal pulses. Feet appropriately warm to touch. Cap " refill time is within normal limits   Neurological:  Light touch, proprioception, and Sharp/dull sensation are all intact. There is no Tinel's along the tarsal tunnel.    Derm: No open lesions, macerations, or rashes.  MSK:  Tenderness to palpation near the 3rd, 4th and 5th metatarso-phalangeal joint, minimal swelling, no bruising or redness noted.  No increased temperature note.        Imagings:      7/27/17: XRAY  Polyarthritis consistent with RA similar to old exam. No acute abnormality.     8/7/17 MRI:  Stress fracture of the 3rd metatarsal.  Periarticular erosions involving the 1st interphalangeal joint and 4th and 5th MTP joints, findings corresponding to this patient's history of rheumatoid arthritis.  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    9/12/17  MRI:   Continued visualization of abnormal bone marrow signal involving the third metatarsal with a nondisplaced fracture involving the proximal metaphysis of the third metatarsal. There has been interval increased osseous callus when compared to previous MRI dated 8/7/17.    9/21/17 XRAY  3 views    Acute nondisplaced transverse fracture identified involving the fourth metatarsal bone.  Healing fracture of the third metatarsal bone identified in satisfactory position and alignment.  DJD and inflammatory arthropathy again noted.  Pes planus.            ASSESSMENT/PLAN:      I counseled the patient on her conditions, their implications and medical management.      Closed nondisplaced fracture of fourth metatarsal bone of right foot, initial encounter  · RICE.    · Walking Boot  · Non-weight bearing   · She already has pain meds at home.     Right foot pain  -     X-Ray Foot Complete Right; Future; Expected date: 09/21/2017  - Reviewed with the patient   - See above    Rheumatoid arthritis, involving unspecified site, unspecified rheumatoid factor presence  · follow up with Endocrinology and Rheumatology.     · High risk for  fractures due to osteopenia.

## 2017-09-22 ENCOUNTER — PATIENT MESSAGE (OUTPATIENT)
Dept: PODIATRY | Facility: CLINIC | Age: 57
End: 2017-09-22

## 2017-09-22 ENCOUNTER — TELEPHONE (OUTPATIENT)
Dept: RHEUMATOLOGY | Facility: CLINIC | Age: 57
End: 2017-09-22

## 2017-09-22 ENCOUNTER — PATIENT MESSAGE (OUTPATIENT)
Dept: RHEUMATOLOGY | Facility: CLINIC | Age: 57
End: 2017-09-22

## 2017-09-22 ENCOUNTER — PATIENT MESSAGE (OUTPATIENT)
Dept: ENDOCRINOLOGY | Facility: CLINIC | Age: 57
End: 2017-09-22

## 2017-09-22 ENCOUNTER — TELEPHONE (OUTPATIENT)
Dept: PODIATRY | Facility: CLINIC | Age: 57
End: 2017-09-22

## 2017-09-22 NOTE — TELEPHONE ENCOUNTER
----- Message from Bryce Mayer sent at 9/22/2017 10:13 AM CDT -----  Contact: Self/Home:[phone] Cell:[phone]  Pt called stating that she spoke w/ her insurance ( [employer]). Pt states that insurance requires Dr. Ramos to put in a prior authorization to determine if the insurance will pay for pt's bone stimulator. Pt requesting a call-back when Dr. Ramos puts in the prior authorization.  Pt can be reached at home ([phone]) or cell ([phone]).        I call and talk to patient and this is the provider   number for the prior auth [phone] and patient ID member number is [id number] . Patient need a call back after the prior auth.

## 2017-09-22 NOTE — TELEPHONE ENCOUNTER
----- Message from Bryce Mayer sent at 9/22/2017 10:13 AM CDT -----  Contact: Self/Home:[phone] Cell:[phone]  Pt called stating that she spoke w/ her insurance ( [employer]). Pt states that insurance requires Dr. Ramos to put in a prior authorization to determine if the insurance will pay for pt's bone stimulator. Pt requesting a call-back when Dr. Ramos puts in the prior authorization.  Pt can be reached at home ([phone]) or cell ([phone]).

## 2017-09-28 ENCOUNTER — TELEPHONE (OUTPATIENT)
Dept: RHEUMATOLOGY | Facility: CLINIC | Age: 57
End: 2017-09-28

## 2017-10-03 ENCOUNTER — OFFICE VISIT (OUTPATIENT)
Dept: PODIATRY | Facility: CLINIC | Age: 57
End: 2017-10-03
Payer: COMMERCIAL

## 2017-10-03 ENCOUNTER — HOSPITAL ENCOUNTER (OUTPATIENT)
Dept: RADIOLOGY | Facility: HOSPITAL | Age: 57
Discharge: HOME | End: 2017-10-03
Attending: PODIATRIST
Payer: COMMERCIAL

## 2017-10-03 VITALS
WEIGHT: 149 LBS | BODY MASS INDEX: 28.13 KG/M2 | DIASTOLIC BLOOD PRESSURE: 82 MMHG | SYSTOLIC BLOOD PRESSURE: 149 MMHG | HEIGHT: 61 IN | HEART RATE: 109 BPM

## 2017-10-03 DIAGNOSIS — M06.9 RHEUMATOID ARTHRITIS, INVOLVING UNSPECIFIED SITE, UNSPECIFIED RHEUMATOID FACTOR PRESENCE: ICD-10-CM

## 2017-10-03 DIAGNOSIS — M84.474S METATARSAL FRACTURE, PATHOLOGIC, RIGHT, SEQUELA: Primary | ICD-10-CM

## 2017-10-03 DIAGNOSIS — M84.474S METATARSAL FRACTURE, PATHOLOGIC, RIGHT, SEQUELA: ICD-10-CM

## 2017-10-03 DIAGNOSIS — M79.671 RIGHT FOOT PAIN: ICD-10-CM

## 2017-10-03 DIAGNOSIS — M84.374S STRESS FRACTURE OF RIGHT FOOT, SEQUELA: ICD-10-CM

## 2017-10-03 DIAGNOSIS — S92.344A CLOSED NONDISPLACED FRACTURE OF FOURTH METATARSAL BONE OF RIGHT FOOT, INITIAL ENCOUNTER: ICD-10-CM

## 2017-10-03 PROCEDURE — 99213 OFFICE O/P EST LOW 20 MIN: CPT | Mod: PBBFAC,25,PO | Performed by: PODIATRIST

## 2017-10-03 PROCEDURE — 73630 X-RAY EXAM OF FOOT: CPT | Mod: 26,RT,, | Performed by: RADIOLOGY

## 2017-10-03 PROCEDURE — 99213 OFFICE O/P EST LOW 20 MIN: CPT | Mod: S$GLB,,, | Performed by: PODIATRIST

## 2017-10-03 PROCEDURE — 99999 PR PBB SHADOW E&M-EST. PATIENT-LVL III: CPT | Mod: PBBFAC,,, | Performed by: PODIATRIST

## 2017-10-03 PROCEDURE — 73630 X-RAY EXAM OF FOOT: CPT | Mod: TC,PO,RT

## 2017-10-03 RX ORDER — FLUTICASONE PROPIONATE 50 MCG
SPRAY, SUSPENSION (ML) NASAL
COMMUNITY
Start: 2017-09-12 | End: 2019-07-08 | Stop reason: SDUPTHER

## 2017-10-03 RX ORDER — PREDNISONE 1 MG/1
1 TABLET ORAL DAILY
COMMUNITY
End: 2018-03-29 | Stop reason: SDUPTHER

## 2017-10-03 NOTE — PROGRESS NOTES
"CC:   Foot pain       HPI:       Joyce Peterson is a 57 y.o. female who presents to clinic complaining of right foot pain  She has history of Rheumatoid Arthritis and osteoporosis.   She reports right foot pain at the lateral forefoot aspect for about 2 weeks without apparent trauma to the are.  She reports that she saw Dr. Steele last week and was given a CAM boot to wear.   She states that the boot is uncomfortable and does not seem to improve the pain at this time.  She reports no fevers or chills.  She will being going on a trip for a few weeks in October and wants to make sure "it's not serious".      8/21/17:  follow up RIGHT foot pain.  MRI shows stress fracture of 3rd metatarsal.  She has been in a Darco stiff sole shoe.  The fracture/CAM boot is uncomfortable to wear.  She reports improvement in pain but wants to make sure there's healing before her trip.   She has history of multiple fractures in her foot.  She has had a bone stimulator in the past due to delayed bone healing.    9/21/17:  Flare up of pain and swelling right foot at the 4th metatarso-phalangeal joint area since yesterday without apparent trauma.    She started wearing her stiff sole shoe again to stabilize the area.      10/3/17:  follow up RIGHT foot 4th metatarsal fracture.  She has history of 3rd metatarsal fracture.   She is wearing her Darco stiff sole shoe.   Unable to tolerate CAM boot.         Past Medical History:   Diagnosis Date    Acid reflux     Allergy     Anemia     Asthma     Coronary artery disease     Degenerative disc disease     Dry eyes     Dry mouth     Gastroparesis     Hyperlipidemia     Lateral meniscus derangement 4/6/2016    Osteoarthritis     Osteoporosis     Rheumatoid arthritis(714.0)     Umbilical hernia 8/13/2015         Current Outpatient Prescriptions on File Prior to Visit   Medication Sig Dispense Refill    albuterol 90 mcg/actuation inhaler Inhale 2 puffs into the lungs every 6 " (six) hours as needed for Wheezing. 18 g 11    aspirin 81 mg Tab Take 81 mg by mouth every morning.       azelastine (ASTELIN) 137 mcg nasal spray 1 spray (137 mcg total) by Nasal route 2 (two) times daily. (Patient taking differently: 1 spray by Nasal route 2 (two) times daily as needed. ) 30 mL 11    budesonide-formoterol 160-4.5 mcg (SYMBICORT) 160-4.5 mcg/actuation HFAA Inhale 2 puffs into the lungs every 12 (twelve) hours. 3 Inhaler 3    calcium citrate-vitamin D (CITRACAL + D) 315-200 mg-unit per tablet Take 1 tablet by mouth 2 (two) times daily.       CYCLOSPORINE (RESTASIS OPHT) Inject 0.05 % into the eye. 1 Dropperette Both eyes Twice a day .  dispense one month supply/sixty-four vials/ two trays      fish oil-omega-3 fatty acids 300-1,000 mg capsule Take 1,400 g by mouth once daily.      flaxseed oil 1,000 mg Cap Take by mouth once daily.      GUAIFENESIN (MUCINEX ORAL) Take 400 mg by mouth every 4 (four) hours as needed.       INV PROPULSID 10 MG Take 20 mg by mouth 4 (four) times daily as directed by physician.  For investigational use only 480 each 2    leflunomide (ARAVA) 20 MG Tab Take 1 tablet (20 mg total) by mouth once daily. 90 tablet 0    montelukast (SINGULAIR) 10 mg tablet Take 1 tablet (10 mg total) by mouth nightly. 90 tablet 3    naproxen (NAPROSYN) 500 MG tablet Take 1 tablet (500 mg total) by mouth 2 (two) times daily. 180 tablet 1    omeprazole (PRILOSEC) 40 MG capsule TAKE ONE CAPSULE BY MOUTH EVERY MORNING 30 capsule 6    omeprazole-sodium bicarbonate (ZEGERID) 40-1.1 mg-gram per capsule Take 1 capsule by mouth every morning. 90 capsule 3    POTASSIUM ORAL Take by mouth once daily.      predniSONE (DELTASONE) 5 MG tablet Take 1 tablet (5 mg total) by mouth once daily. 90 tablet 1    PREMARIN 0.625 mg tablet Take 0.625 mg by mouth once daily.  11    progesterone (PROMETRIUM) 100 MG capsule Take 100 mg by mouth every morning. 1 Capsule Oral Every day, morning       "RESTASIS 0.05 % ophthalmic emulsion       rizatriptan (MAXALT) 10 MG tablet Take 10 mg by mouth as needed for Migraine.       rosuvastatin (CRESTOR) 20 MG tablet TAKE 1 TABLET EVERY DAY 90 tablet 3    sucralfate (CARAFATE) 1 gram tablet Take 1 tablet (1 g total) by mouth 4 (four) times daily. 360 tablet 3    tofacitinib (XELJANZ XR) 11 mg Tb24 Take 1 tablet by mouth once daily. 90 tablet 0    tramadol (ULTRAM) 50 mg tablet TAKE 1 TABLET DAILY AS NEEDED PAIN 30 tablet 0    VESICARE 10 mg tablet Take 1 tablet by mouth every morning.       [DISCONTINUED] predniSONE (DELTASONE) 5 MG tablet Take 1 tablet (5 mg total) by mouth once daily. 90 tablet 1     No current facility-administered medications on file prior to visit.            Review of patient's allergies indicates:   Allergen Reactions    Actemra [tocilizumab] Other (See Comments)     Severe dizziness    Codeine Nausea And Vomiting    Gold au 198 Hives and Rash    Hydroxychloroquine Other (See Comments)     Can't remember the reaction      Iodinated contrast media - oral and iv dye Other (See Comments)     Other reaction(s): BURNING ALL OVER    Iodine Other (See Comments)     Other reaction(s): BURNING ALL OVER - Iodine dye - Not topical    Sulfa (sulfonamide antibiotics) Other (See Comments)     Can't remember the reaction    Zofran [ondansetron hcl (pf)] Nausea And Vomiting     Pt reports that last time she received zofran she started vomiting again    Methotrexate analogues Nausea Only    Pneumovax 23 [pneumococcal 23-argenis ps vaccine] Other (See Comments)     "sick"             ROS:  General ROS: negative for  chills, fatigue or fever  Cardiovascular ROS: no chest pain or dyspnea on exertion  Musculoskeletal ROS: negative for joint pain or joint stiffness.  Negative for loss of strength.  Positive for foot pain.   Neuro ROS: Negative for syncope, numbness, or muscle weakness  Skin ROS: Negative for rash, itching or nail/hair changes. " "          OBJECTIVE:         Vitals:    10/03/17 1412   BP: (!) 149/82   Pulse: 109   Weight: 67.6 kg (149 lb)   Height: 5' 1" (1.549 m)        Right Lower extremity exam:  Vasc: Palpable pedal pulses. Feet appropriately warm to touch. Cap refill time is within normal limits   Neurological:  Light touch, proprioception, and Sharp/dull sensation are all intact. There is no Tinel's along the tarsal tunnel.    Derm: No open lesions, macerations, or rashes.  MSK:  Tenderness to palpation near the 3rd, 4th metatarsal shafts;  minimal swelling, no bruising or redness noted.  No increased temperature note.        Imagings:      7/27/17: XRAY  Polyarthritis consistent with RA similar to old exam. No acute abnormality.     8/7/17 MRI:  Stress fracture of the 3rd metatarsal.  Periarticular erosions involving the 1st interphalangeal joint and 4th and 5th MTP joints, findings corresponding to this patient's history of rheumatoid arthritis.  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    9/12/17  MRI:   Continued visualization of abnormal bone marrow signal involving the third metatarsal with a nondisplaced fracture involving the proximal metaphysis of the third metatarsal. There has been interval increased osseous callus when compared to previous MRI dated 8/7/17.    9/21/17 XRAY  3 views    Acute nondisplaced transverse fracture identified involving the fourth metatarsal bone.  Healing fracture of the third metatarsal bone identified in satisfactory position and alignment.  DJD and inflammatory arthropathy again noted.  Pes planus.      10/3/17 xrays 3 views: There are fractures of the third and fourth metatarsals.  There is DJD of flatfoot deformity and cystic change of the fourth metatarsal head probably degenerative.          ASSESSMENT/PLAN:      I counseled the patient on her conditions, their implications and medical management.        Metatarsal fracture, pathologic, right, sequela  -     " X-Ray Foot Complete Right;  _ REVIEWED with the patient   -     HME - OTHER - for Knee scooter/walker.   Patient needs to be NWB.   She will be out of town.  She has gotten a wheelchair to help her keep off her feet.     Right foot pain  -     HME - OTHER    Rheumatoid arthritis, involving unspecified site, unspecified rheumatoid factor presence  -     HME - OTHER    Stress fracture of right foot, sequela  -     HME - OTHER      Return in about 1 month (around 11/3/2017). when she returns from her trip.

## 2017-11-02 ENCOUNTER — HOSPITAL ENCOUNTER (OUTPATIENT)
Dept: RADIOLOGY | Facility: HOSPITAL | Age: 57
Discharge: HOME | End: 2017-11-02
Attending: PODIATRIST
Payer: COMMERCIAL

## 2017-11-02 ENCOUNTER — OFFICE VISIT (OUTPATIENT)
Dept: PODIATRY | Facility: CLINIC | Age: 57
End: 2017-11-02
Payer: COMMERCIAL

## 2017-11-02 VITALS
DIASTOLIC BLOOD PRESSURE: 82 MMHG | BODY MASS INDEX: 28.13 KG/M2 | HEIGHT: 61 IN | SYSTOLIC BLOOD PRESSURE: 149 MMHG | HEART RATE: 109 BPM | WEIGHT: 149 LBS

## 2017-11-02 DIAGNOSIS — M79.671 RIGHT FOOT PAIN: ICD-10-CM

## 2017-11-02 DIAGNOSIS — M84.474S METATARSAL FRACTURE, PATHOLOGIC, RIGHT, SEQUELA: ICD-10-CM

## 2017-11-02 DIAGNOSIS — M06.9 RHEUMATOID ARTHRITIS, INVOLVING UNSPECIFIED SITE, UNSPECIFIED RHEUMATOID FACTOR PRESENCE: ICD-10-CM

## 2017-11-02 DIAGNOSIS — M84.474S METATARSAL FRACTURE, PATHOLOGIC, RIGHT, SEQUELA: Primary | ICD-10-CM

## 2017-11-02 PROCEDURE — 99213 OFFICE O/P EST LOW 20 MIN: CPT | Mod: S$GLB,,, | Performed by: PODIATRIST

## 2017-11-02 PROCEDURE — 99213 OFFICE O/P EST LOW 20 MIN: CPT | Mod: PBBFAC,25,PO | Performed by: PODIATRIST

## 2017-11-02 PROCEDURE — 99999 PR PBB SHADOW E&M-EST. PATIENT-LVL III: CPT | Mod: PBBFAC,,, | Performed by: PODIATRIST

## 2017-11-02 PROCEDURE — 73630 X-RAY EXAM OF FOOT: CPT | Mod: 26,RT,, | Performed by: RADIOLOGY

## 2017-11-02 PROCEDURE — 73630 X-RAY EXAM OF FOOT: CPT | Mod: TC,PO,RT

## 2017-11-03 NOTE — PROGRESS NOTES
"CC:   Foot pain       HPI:       Joyce Peterson is a 57 y.o. female who presents to clinic complaining of right foot pain  She has history of Rheumatoid Arthritis and osteoporosis.   She reports right foot pain at the lateral forefoot aspect for about 2 weeks without apparent trauma to the are.  She reports that she saw Dr. Steele last week and was given a CAM boot to wear.   She states that the boot is uncomfortable and does not seem to improve the pain at this time.  She reports no fevers or chills.  She will being going on a trip for a few weeks in October and wants to make sure "it's not serious".      8/21/17:  follow up RIGHT foot pain.  MRI shows stress fracture of 3rd metatarsal.  She has been in a Darco stiff sole shoe.  The fracture/CAM boot is uncomfortable to wear.  She reports improvement in pain but wants to make sure there's healing before her trip.   She has history of multiple fractures in her foot.  She has had a bone stimulator in the past due to delayed bone healing.  9/21/17:  Flare up of pain and swelling right foot at the 4th metatarso-phalangeal joint area since yesterday without apparent trauma.    She started wearing her stiff sole shoe again to stabilize the area.    10/3/17:  follow up RIGHT foot 4th metatarsal fracture.  She has history of 3rd metatarsal fracture.   She is wearing her Darco stiff sole shoe.   Unable to tolerate CAM boot.     11/2/17:  follow up right foot.  She has been out of town for a vacation.  Offloaded as much as she could.  Had been alternating between tennis shoes and Darco.  Pain is improving.  She used a bone stimulator for about the first two weeks of her trip.            Past Medical History:   Diagnosis Date    Acid reflux     Allergy     Anemia     Asthma     Coronary artery disease     Degenerative disc disease     Dry eyes     Dry mouth     Gastroparesis     Hyperlipidemia     Lateral meniscus derangement 4/6/2016    Osteoarthritis     " Osteoporosis     Rheumatoid arthritis(714.0)     Umbilical hernia 8/13/2015         Current Outpatient Prescriptions on File Prior to Visit   Medication Sig Dispense Refill    albuterol 90 mcg/actuation inhaler Inhale 2 puffs into the lungs every 6 (six) hours as needed for Wheezing. 18 g 11    aspirin 81 mg Tab Take 81 mg by mouth every morning.       azelastine (ASTELIN) 137 mcg nasal spray 1 spray (137 mcg total) by Nasal route 2 (two) times daily. (Patient taking differently: 1 spray by Nasal route 2 (two) times daily as needed. ) 30 mL 11    budesonide-formoterol 160-4.5 mcg (SYMBICORT) 160-4.5 mcg/actuation HFAA Inhale 2 puffs into the lungs every 12 (twelve) hours. 3 Inhaler 3    calcium citrate-vitamin D (CITRACAL + D) 315-200 mg-unit per tablet Take 1 tablet by mouth 2 (two) times daily.       CYCLOSPORINE (RESTASIS OPHT) Inject 0.05 % into the eye. 1 Dropperette Both eyes Twice a day .  dispense one month supply/sixty-four vials/ two trays      fish oil-omega-3 fatty acids 300-1,000 mg capsule Take 1,400 g by mouth once daily.      flaxseed oil 1,000 mg Cap Take by mouth once daily.      fluticasone (FLONASE) 50 mcg/actuation nasal spray       GUAIFENESIN (MUCINEX ORAL) Take 400 mg by mouth every 4 (four) hours as needed.       INV PROPULSID 10 MG Take 20 mg by mouth 4 (four) times daily as directed by physician.  For investigational use only 480 each 2    leflunomide (ARAVA) 20 MG Tab Take 1 tablet (20 mg total) by mouth once daily. 90 tablet 0    montelukast (SINGULAIR) 10 mg tablet Take 1 tablet (10 mg total) by mouth nightly. 90 tablet 3    naproxen (NAPROSYN) 500 MG tablet Take 1 tablet (500 mg total) by mouth 2 (two) times daily. 180 tablet 1    omeprazole (PRILOSEC) 40 MG capsule TAKE ONE CAPSULE BY MOUTH EVERY MORNING 30 capsule 6    omeprazole-sodium bicarbonate (ZEGERID) 40-1.1 mg-gram per capsule Take 1 capsule by mouth every morning. 90 capsule 3    POTASSIUM ORAL Take by  "mouth once daily.      predniSONE (DELTASONE) 1 MG tablet Take 1 mg by mouth once daily.      predniSONE (DELTASONE) 5 MG tablet Take 1 tablet (5 mg total) by mouth once daily. 90 tablet 1    PREMARIN 0.625 mg tablet Take 0.625 mg by mouth once daily.  11    progesterone (PROMETRIUM) 100 MG capsule Take 100 mg by mouth every morning. 1 Capsule Oral Every day, morning      RESTASIS 0.05 % ophthalmic emulsion       rizatriptan (MAXALT) 10 MG tablet Take 10 mg by mouth as needed for Migraine.       rosuvastatin (CRESTOR) 20 MG tablet TAKE 1 TABLET EVERY DAY 90 tablet 3    sucralfate (CARAFATE) 1 gram tablet Take 1 tablet (1 g total) by mouth 4 (four) times daily. 360 tablet 3    tofacitinib (XELJANZ XR) 11 mg Tb24 Take 1 tablet by mouth once daily. 90 tablet 0    tramadol (ULTRAM) 50 mg tablet TAKE 1 TABLET DAILY AS NEEDED PAIN 30 tablet 0    VESICARE 10 mg tablet Take 1 tablet by mouth every morning.        No current facility-administered medications on file prior to visit.            Review of patient's allergies indicates:   Allergen Reactions    Actemra [tocilizumab] Other (See Comments)     Severe dizziness    Codeine Nausea And Vomiting    Gold au 198 Hives and Rash    Hydroxychloroquine Other (See Comments)     Can't remember the reaction      Iodinated contrast media - oral and iv dye Other (See Comments)     Other reaction(s): BURNING ALL OVER    Iodine Other (See Comments)     Other reaction(s): BURNING ALL OVER - Iodine dye - Not topical    Sulfa (sulfonamide antibiotics) Other (See Comments)     Can't remember the reaction    Zofran [ondansetron hcl (pf)] Nausea And Vomiting     Pt reports that last time she received zofran she started vomiting again    Methotrexate analogues Nausea Only    Pneumovax 23 [pneumococcal 23-argenis ps vaccine] Other (See Comments)     "sick"             ROS:  General ROS: negative for  chills, fatigue or fever  Cardiovascular ROS: no chest pain or dyspnea on " "exertion  Musculoskeletal ROS: negative for joint pain or joint stiffness.  Negative for loss of strength.  Positive for foot pain.   Neuro ROS: Negative for syncope, numbness, or muscle weakness  Skin ROS: Negative for rash, itching or nail/hair changes.           OBJECTIVE:         Vitals:    11/02/17 1311   BP: (!) 149/82   Pulse: 109   Weight: 67.6 kg (149 lb)   Height: 5' 1" (1.549 m)        Right Lower extremity exam:  Vasc: Palpable pedal pulses. Feet appropriately warm to touch. Cap refill time is within normal limits   Neurological:  Light touch, proprioception, and Sharp/dull sensation are all intact. There is no Tinel's along the tarsal tunnel.    Derm: No open lesions, macerations, or rashes.  MSK:  Minimal enderness to palpation near the 4th metatarsal shaft;  minimal swelling, no bruising or redness noted.  No increased temperature note.        Imagings:      7/27/17: XRAY  Polyarthritis consistent with RA similar to old exam. No acute abnormality.   8/7/17 MRI:  Stress fracture of the 3rd metatarsal.  Periarticular erosions involving the 1st interphalangeal joint and 4th and 5th MTP joints, findings corresponding to this patient's history of rheumatoid arthritis.  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  9/12/17  MRI:   Continued visualization of abnormal bone marrow signal involving the third metatarsal with a nondisplaced fracture involving the proximal metaphysis of the third metatarsal. There has been interval increased osseous callus when compared to previous MRI dated 8/7/17.  9/21/17 XRAY  3 views    Acute nondisplaced transverse fracture identified involving the fourth metatarsal bone.  Healing fracture of the third metatarsal bone identified in satisfactory position and alignment.  DJD and inflammatory arthropathy again noted.  Pes planus.  10/3/17 xrays 3 views: There are fractures of the third and fourth metatarsals.  There is DJD of flatfoot deformity and " cystic change of the fourth metatarsal head probably degenerative.      11/2/17: Comparison: 10/3/17    Findings:Unchanged alignment of the third and fourth metatarsal fractures when compared to most recent prior. There is mild (approximately 1/2 shaft width) displacement laterally of the fourth metatarsal fracture. Minimal interval callus formation when compared to most recent prior. Unchanged pes planus.          ASSESSMENT/PLAN:      I counseled the patient on her conditions, their implications and medical management.      Metatarsal fracture, pathologic, right, sequela  -     X-Ray Foot Complete Right; Future; Expected date: 11/02/2017    Right foot pain  -     X-Ray Foot Complete Right; Future; Expected date: 11/02/2017    Rheumatoid arthritis, involving unspecified site, unspecified rheumatoid factor presence      · xrays were reviewed with the patient    · Continue rest and non-weight bearing as much as possible until next visit.   · Continue bone stimulator until next visit  · follow up in about a month, or sooner if any issues.

## 2017-11-08 ENCOUNTER — TELEPHONE (OUTPATIENT)
Dept: PHARMACY | Facility: CLINIC | Age: 57
End: 2017-11-08

## 2017-11-13 ENCOUNTER — HOSPITAL ENCOUNTER (OUTPATIENT)
Dept: CARDIOLOGY | Facility: CLINIC | Age: 57
Discharge: HOME | End: 2017-11-13
Payer: COMMERCIAL

## 2017-11-13 ENCOUNTER — LAB VISIT (OUTPATIENT)
Dept: LAB | Facility: HOSPITAL | Age: 57
End: 2017-11-13
Attending: INTERNAL MEDICINE
Payer: COMMERCIAL

## 2017-11-13 ENCOUNTER — OFFICE VISIT (OUTPATIENT)
Dept: GASTROENTEROLOGY | Facility: CLINIC | Age: 57
End: 2017-11-13
Payer: COMMERCIAL

## 2017-11-13 VITALS
DIASTOLIC BLOOD PRESSURE: 79 MMHG | WEIGHT: 148.13 LBS | SYSTOLIC BLOOD PRESSURE: 114 MMHG | BODY MASS INDEX: 27.97 KG/M2 | HEIGHT: 61 IN | HEART RATE: 103 BPM

## 2017-11-13 DIAGNOSIS — K31.84 GASTROPARESIS: Primary | ICD-10-CM

## 2017-11-13 DIAGNOSIS — D50.0 IRON DEFICIENCY ANEMIA DUE TO CHRONIC BLOOD LOSS: ICD-10-CM

## 2017-11-13 DIAGNOSIS — K31.84 GASTROPARESIS: ICD-10-CM

## 2017-11-13 DIAGNOSIS — Z00.6 RESEARCH STUDY PATIENT: ICD-10-CM

## 2017-11-13 LAB
ALBUMIN SERPL BCP-MCNC: 3.5 G/DL
ALP SERPL-CCNC: 70 U/L
ALT SERPL W/O P-5'-P-CCNC: 18 U/L
ANION GAP SERPL CALC-SCNC: 8 MMOL/L
AST SERPL-CCNC: 20 U/L
BASOPHILS # BLD AUTO: 0.04 K/UL
BASOPHILS NFR BLD: 0.5 %
BILIRUB SERPL-MCNC: 0.2 MG/DL
BUN SERPL-MCNC: 14 MG/DL
CALCIUM SERPL-MCNC: 9.3 MG/DL
CHLORIDE SERPL-SCNC: 108 MMOL/L
CO2 SERPL-SCNC: 24 MMOL/L
CREAT SERPL-MCNC: 1 MG/DL
DIFFERENTIAL METHOD: ABNORMAL
EOSINOPHIL # BLD AUTO: 0 K/UL
EOSINOPHIL NFR BLD: 0.3 %
ERYTHROCYTE [DISTWIDTH] IN BLOOD BY AUTOMATED COUNT: 20.4 %
EST. GFR  (AFRICAN AMERICAN): >60 ML/MIN/1.73 M^2
EST. GFR  (NON AFRICAN AMERICAN): >60 ML/MIN/1.73 M^2
GLUCOSE SERPL-MCNC: 110 MG/DL
HCT VFR BLD AUTO: 29.3 %
HGB BLD-MCNC: 8.7 G/DL
IMM GRANULOCYTES # BLD AUTO: 0.02 K/UL
IMM GRANULOCYTES NFR BLD AUTO: 0.3 %
LYMPHOCYTES # BLD AUTO: 1.2 K/UL
LYMPHOCYTES NFR BLD: 15.7 %
MAGNESIUM SERPL-MCNC: 1.9 MG/DL
MCH RBC QN AUTO: 21.5 PG
MCHC RBC AUTO-ENTMCNC: 29.7 G/DL
MCV RBC AUTO: 72 FL
MONOCYTES # BLD AUTO: 0.5 K/UL
MONOCYTES NFR BLD: 7.1 %
NEUTROPHILS # BLD AUTO: 5.6 K/UL
NEUTROPHILS NFR BLD: 76.1 %
NRBC BLD-RTO: 0 /100 WBC
PHOSPHATE SERPL-MCNC: 2.8 MG/DL
PLATELET # BLD AUTO: 426 K/UL
PMV BLD AUTO: 9.9 FL
POTASSIUM SERPL-SCNC: 4 MMOL/L
PROT SERPL-MCNC: 6.7 G/DL
RBC # BLD AUTO: 4.05 M/UL
SODIUM SERPL-SCNC: 140 MMOL/L
WBC # BLD AUTO: 7.32 K/UL

## 2017-11-13 PROCEDURE — 93005 ELECTROCARDIOGRAM TRACING: CPT | Mod: PBBFAC | Performed by: INTERNAL MEDICINE

## 2017-11-13 PROCEDURE — 83540 ASSAY OF IRON: CPT

## 2017-11-13 PROCEDURE — 93000 ELECTROCARDIOGRAM COMPLETE: CPT | Mod: S$GLB,,, | Performed by: INTERNAL MEDICINE

## 2017-11-13 PROCEDURE — 99213 OFFICE O/P EST LOW 20 MIN: CPT | Mod: PBBFAC,25 | Performed by: INTERNAL MEDICINE

## 2017-11-13 PROCEDURE — 85025 COMPLETE CBC W/AUTO DIFF WBC: CPT

## 2017-11-13 PROCEDURE — 36415 COLL VENOUS BLD VENIPUNCTURE: CPT

## 2017-11-13 PROCEDURE — 82728 ASSAY OF FERRITIN: CPT

## 2017-11-13 PROCEDURE — 99999 PR PBB SHADOW E&M-EST. PATIENT-LVL III: CPT | Mod: PBBFAC,,, | Performed by: INTERNAL MEDICINE

## 2017-11-13 PROCEDURE — 80053 COMPREHEN METABOLIC PANEL: CPT

## 2017-11-13 PROCEDURE — 99215 OFFICE O/P EST HI 40 MIN: CPT | Mod: S$GLB,,, | Performed by: INTERNAL MEDICINE

## 2017-11-13 PROCEDURE — 83735 ASSAY OF MAGNESIUM: CPT

## 2017-11-13 PROCEDURE — 84100 ASSAY OF PHOSPHORUS: CPT

## 2017-11-14 ENCOUNTER — INITIAL CONSULT (OUTPATIENT)
Dept: HEMATOLOGY/ONCOLOGY | Facility: CLINIC | Age: 57
End: 2017-11-14
Payer: MEDICARE

## 2017-11-14 ENCOUNTER — OFFICE VISIT (OUTPATIENT)
Dept: OPHTHALMOLOGY | Facility: CLINIC | Age: 57
End: 2017-11-14
Payer: COMMERCIAL

## 2017-11-14 VITALS
WEIGHT: 150.13 LBS | OXYGEN SATURATION: 99 % | DIASTOLIC BLOOD PRESSURE: 75 MMHG | SYSTOLIC BLOOD PRESSURE: 135 MMHG | RESPIRATION RATE: 18 BRPM | HEIGHT: 61 IN | BODY MASS INDEX: 28.35 KG/M2 | TEMPERATURE: 99 F | HEART RATE: 110 BPM

## 2017-11-14 DIAGNOSIS — H16.223 KERATOCONJUNCTIVITIS SICCA NOT SPECIFIED AS SJOGREN'S, BILATERAL: Primary | ICD-10-CM

## 2017-11-14 DIAGNOSIS — M05.712 RHEUMATOID ARTHRITIS INVOLVING BOTH SHOULDERS WITH POSITIVE RHEUMATOID FACTOR: ICD-10-CM

## 2017-11-14 DIAGNOSIS — D50.0 IRON DEFICIENCY ANEMIA DUE TO CHRONIC BLOOD LOSS: ICD-10-CM

## 2017-11-14 DIAGNOSIS — M05.711 RHEUMATOID ARTHRITIS INVOLVING BOTH SHOULDERS WITH POSITIVE RHEUMATOID FACTOR: ICD-10-CM

## 2017-11-14 LAB
FERRITIN SERPL-MCNC: 13 NG/ML
IRON SERPL-MCNC: 22 UG/DL
SATURATED IRON: 4 %
TOTAL IRON BINDING CAPACITY: 545 UG/DL
TRANSFERRIN SERPL-MCNC: 368 MG/DL

## 2017-11-14 PROCEDURE — 92014 COMPRE OPH EXAM EST PT 1/>: CPT | Mod: S$GLB,,, | Performed by: OPHTHALMOLOGY

## 2017-11-14 PROCEDURE — 99999 PR PBB SHADOW E&M-EST. PATIENT-LVL II: CPT | Mod: PBBFAC,,, | Performed by: OPHTHALMOLOGY

## 2017-11-14 PROCEDURE — 99213 OFFICE O/P EST LOW 20 MIN: CPT | Mod: PBBFAC | Performed by: INTERNAL MEDICINE

## 2017-11-14 PROCEDURE — 99212 OFFICE O/P EST SF 10 MIN: CPT | Mod: PBBFAC | Performed by: OPHTHALMOLOGY

## 2017-11-14 PROCEDURE — 99999 PR PBB SHADOW E&M-EST. PATIENT-LVL III: CPT | Mod: PBBFAC,,, | Performed by: INTERNAL MEDICINE

## 2017-11-14 PROCEDURE — 99214 OFFICE O/P EST MOD 30 MIN: CPT | Mod: S$PBB,,, | Performed by: INTERNAL MEDICINE

## 2017-11-14 RX ORDER — HEPARIN 100 UNIT/ML
500 SYRINGE INTRAVENOUS
Status: CANCELLED | OUTPATIENT
Start: 2017-11-27

## 2017-11-14 RX ORDER — HEPARIN 100 UNIT/ML
500 SYRINGE INTRAVENOUS
Status: CANCELLED | OUTPATIENT
Start: 2017-11-20

## 2017-11-14 RX ORDER — SODIUM CHLORIDE 0.9 % (FLUSH) 0.9 %
10 SYRINGE (ML) INJECTION
Status: CANCELLED | OUTPATIENT
Start: 2017-11-27

## 2017-11-14 RX ORDER — SODIUM CHLORIDE 0.9 % (FLUSH) 0.9 %
10 SYRINGE (ML) INJECTION
Status: CANCELLED | OUTPATIENT
Start: 2017-11-20

## 2017-11-14 NOTE — Clinical Note
-Schedule Iv feraheme x 2 weekly doses to start In next week; call pt to schedule convenient time  -cbc, ferritin and md appt in 2 months

## 2017-11-14 NOTE — LETTER
November 14, 2017      Hamida Batista MD  [address]  [facility] [address]           [facility]  [address]  [facility] [address]  Phone: [phone]          Patient: Joyce Peterson   MR Number: [id number]   YOB: 1960   Date of Visit: 11/14/2017       Dear Dr. Hamida Batista:    Thank you for referring Joyce Peterson to me for evaluation. Attached you will find relevant portions of my assessment and plan of care.    If you have questions, please do not hesitate to call me. I look forward to following Joyce Peterson along with you.    Sincerely,    Manjeet Woodson MD    Enclosure  CC:  No Recipients    If you would like to receive this communication electronically, please contact [email or web address] or [phone] to request more information on [employer] access.    For providers and/or their staff who would like to refer a patient to [facility], please contact us through our one-stop-shop provider referral line, [facility] , at [phone].    If you feel you have received this communication in error or would no longer like to receive these types of communications, please e-mail [email or web address]

## 2017-11-14 NOTE — PROGRESS NOTES
HPI     Patrice E Adrien, OD  / [facility]    RA assoc K sicca   S/p temp plugs RLL, YOANNA - did not do well with plugs (LLL stenosed close)    Eyemeds  Serum Gtts QID OU  Restasis OU BID   Refresh all day OU     Pt here for f/u of dry eyes.  Pt states her vision is blurry OU.  Pt   states her eyes feel uncomfortable.  Pt states she just got back from   [place] 2 weeks ago and didn't have drops for 3 weeks.     Last edited by Jamila Armas MD on 11/14/2017 11:29 AM. (History)            Assessment /Plan     For exam results, see Encounter Report.    Keratoconjunctivitis sicca not specified as Sjogren's, bilateral    Rheumatoid arthritis involving both shoulders with positive rheumatoid factor    Other orders  -     cyclosporine 0.05 % Drop; Apply 1 drop to eye 2 (two) times daily. DISPENSE 3 MONTH SUPPLY  Dispense: 5.5 mL; Refill: 11      Keratitis Sicca with underlying RA, most likely consistent with Sjogren's syndrome  - Decreased Schirmer with anesthesia (on initial visit); pt also notes dry mouth  - Not interested in punctal plugs, prior failure  - Symptoms and pain much improved after starting ASD (formerly through [facility], now Dr. Hardy)   - Continue ASD QID  - Continue Restasis BID - refilled today  - Change AT's to PF AT's up to q2hrs prn    RE  - overdue f/up with Dr. FISHER    Pt to contact Dr. Hardy's office for appt / refill ASD --> will inquire if stronger concentration may be beneficial as pt notes that prior ASD from [facility] may have been more efficacious.

## 2017-11-14 NOTE — PROGRESS NOTES
SECTION OF HEMATOLOGY AND BONE MARROW TRANSPLANT  New Patient Visit   11/14/2017  Referred by:  Dr. Hamida Batista  Referred for: ROBERT    CHIEF COMPLAINT:   Chief Complaint   Patient presents with    Anemia    Fatigue       HISTORY OF PRESENT ILLNESS:   Referred for ROBERT.  Intolerant to po iron.  Iron studies in march 2017 with severe ID.  History RA followed by Dr. Valverde.  History of gastroparesis followed by Dr. Moore in GI.  Denies fever, chills, nightsweats, bleeding, brusing, lymphadenopathy, signs/symptoms of splenomegaly.   Had upper and lower endoscopy summer 2017 unremarkable.  No VCE done.  Denies any overt GI bleeding or other bleeding.      PAST MEDICAL HISTORY:   Past Medical History:   Diagnosis Date    Acid reflux     Allergy     Anemia     Asthma     Coronary artery disease     Degenerative disc disease     Dry eyes     Dry mouth     Gastroparesis     Hyperlipidemia     Lateral meniscus derangement 4/6/2016    Osteoarthritis     Osteoporosis     Rheumatoid arthritis(714.0)     Umbilical hernia 8/13/2015       PAST SURGICAL HISTORY:   Past Surgical History:   Procedure Laterality Date    BREAST BIOPSY Left 01/29/2002    core bx    CARPAL TUNNEL RELEASE Right 05/2017    CHOLECYSTECTOMY  2004    COLONOSCOPY      10/11    COLONOSCOPY N/A 6/29/2017    Procedure: COLONOSCOPY;  Surgeon: López Moore MD;  Location: [facility] ([address] [place] [address]);  Service: Endoscopy;  Laterality: N/A;    TONSILLECTOMY      TUBAL LIGATION  2003    UPPER GASTROINTESTINAL ENDOSCOPY      10/11    uterine ablation  2003       PAST SOCIAL HISTORY:   reports that she has never smoked. She has never used smokeless tobacco. She reports that she drinks alcohol. She reports that she does not use drugs.    FAMILY HISTORY:  Family History   Problem Relation Age of Onset    Cancer Mother      Lung Cancer    Emphysema Mother     Heart attack Mother     Cancer Father      Lung Cancer    Osteoarthritis Father      Lung cancer Father     Skin cancer Father     Heart disease Brother     Heart attack Brother     Osteoarthritis Paternal Aunt     Retinal detachment Maternal Aunt     No Known Problems Sister     No Known Problems Maternal Uncle     No Known Problems Paternal Uncle     No Known Problems Maternal Grandmother     No Known Problems Maternal Grandfather     No Known Problems Paternal Grandmother     No Known Problems Paternal Grandfather     Colon cancer Neg Hx     Esophageal cancer Neg Hx     Stomach cancer Neg Hx     Celiac disease Neg Hx     Diabetes Neg Hx     Thyroid disease Neg Hx     Amblyopia Neg Hx     Blindness Neg Hx     Cataracts Neg Hx     Glaucoma Neg Hx     Hypertension Neg Hx     Macular degeneration Neg Hx     Strabismus Neg Hx     Stroke Neg Hx        CURRENT MEDICATIONS:   Current Outpatient Prescriptions   Medication Sig    albuterol 90 mcg/actuation inhaler Inhale 2 puffs into the lungs every 6 (six) hours as needed for Wheezing.    aspirin 81 mg Tab Take 81 mg by mouth every morning.     azelastine (ASTELIN) 137 mcg nasal spray 1 spray (137 mcg total) by Nasal route 2 (two) times daily. (Patient taking differently: 1 spray by Nasal route 2 (two) times daily as needed. )    budesonide-formoterol 160-4.5 mcg (SYMBICORT) 160-4.5 mcg/actuation HFAA Inhale 2 puffs into the lungs every 12 (twelve) hours.    calcium citrate-vitamin D (CITRACAL + D) 315-200 mg-unit per tablet Take 1 tablet by mouth 2 (two) times daily.     CYCLOSPORINE (RESTASIS OPHT) Inject 0.05 % into the eye. 1 Dropperette Both eyes Twice a day .  dispense one month supply/sixty-four vials/ two trays    cyclosporine 0.05 % Drop Apply 1 drop to eye 2 (two) times daily. DISPENSE 3 MONTH SUPPLY    fish oil-omega-3 fatty acids 300-1,000 mg capsule Take 1,400 g by mouth once daily.    flaxseed oil 1,000 mg Cap Take by mouth once daily.    fluticasone (FLONASE) 50 mcg/actuation nasal spray     GUAIFENESIN  (MUCINEX ORAL) Take 400 mg by mouth every 4 (four) hours as needed.     INV PROPULSID 10 MG Take 20 mg by mouth 4 (four) times daily as directed by physician.  For investigational use only    leflunomide (ARAVA) 20 MG Tab Take 1 tablet (20 mg total) by mouth once daily.    montelukast (SINGULAIR) 10 mg tablet Take 1 tablet (10 mg total) by mouth nightly.    naproxen (NAPROSYN) 500 MG tablet Take 1 tablet (500 mg total) by mouth 2 (two) times daily.    omeprazole (PRILOSEC) 40 MG capsule TAKE ONE CAPSULE BY MOUTH EVERY MORNING    omeprazole-sodium bicarbonate (ZEGERID) 40-1.1 mg-gram per capsule Take 1 capsule by mouth every morning.    POTASSIUM ORAL Take by mouth once daily.    predniSONE (DELTASONE) 1 MG tablet Take 1 mg by mouth once daily.    predniSONE (DELTASONE) 5 MG tablet Take 1 tablet (5 mg total) by mouth once daily.    PREMARIN 0.625 mg tablet Take 0.625 mg by mouth once daily.    progesterone (PROMETRIUM) 100 MG capsule Take 100 mg by mouth every morning. 1 Capsule Oral Every day, morning    RESTASIS 0.05 % ophthalmic emulsion     rizatriptan (MAXALT) 10 MG tablet Take 10 mg by mouth as needed for Migraine.     rosuvastatin (CRESTOR) 20 MG tablet TAKE 1 TABLET EVERY DAY    sucralfate (CARAFATE) 1 gram tablet Take 1 tablet (1 g total) by mouth 4 (four) times daily.    tofacitinib (XELJANZ XR) 11 mg Tb24 Take 1 tablet by mouth once daily.    tramadol (ULTRAM) 50 mg tablet TAKE 1 TABLET DAILY AS NEEDED PAIN    VESICARE 10 mg tablet Take 1 tablet by mouth every morning.      No current facility-administered medications for this visit.      ALLERGIES:   Review of patient's allergies indicates:   Allergen Reactions    Actemra [tocilizumab] Other (See Comments)     Severe dizziness    Codeine Nausea And Vomiting    Gold au 198 Hives and Rash    Hydroxychloroquine Other (See Comments)     Can't remember the reaction      Iodinated contrast- oral and iv dye Other (See Comments)     Other  "reaction(s): BURNING ALL OVER    Iodine Other (See Comments)     Other reaction(s): BURNING ALL OVER - Iodine dye - Not topical    Sulfa (sulfonamide antibiotics) Other (See Comments)     Can't remember the reaction    Zofran [ondansetron hcl (pf)] Nausea And Vomiting     Pt reports that last time she received zofran she started vomiting again    Methotrexate analogues Nausea Only    Pneumovax 23 [pneumococcal 23-argenis ps vaccine] Other (See Comments)     "sick"             REVIEW OF SYSTEMS:   General ROS: negative  Psychological ROS: negative  Ophthalmic ROS: negative  ENT ROS: negative  Allergy and Immunology ROS: negative  Hematological and Lymphatic ROS: negative  Endocrine ROS: negative  Respiratory ROS: negative  Cardiovascular ROS: negative  Gastrointestinal ROS: see HPI  Genito-Urinary ROS: negative  Musculoskeletal ROS: see HPI  Neurological ROS: negative  Dermatological ROS: negative    PHYSICAL EXAM:   Vitals:    11/14/17 1303   BP: 135/75   Pulse: 110   Resp: 18   Temp: 98.5 °F (36.9 °C)       General - well developed, well nourished, no apparent distress  Head & Face - no sinus tenderness  Eyes - normal conjunctivae and lids   ENT - normal external auditory canals and tympanic membranes bilaterally oropharynx clear,  Normal dentition and gums  Neck - normal thyroid  Chest and Lung - normal respiratory effort, clear to auscultation bilaterally   Cardiovascular - RRR with no MGR, normal S1 and S2; no pedal edema  Abdomen -  soft, nontender, no palpable hepatomegaly or splenomegaly  Lymph - no palpable lymphadenopathy  Extremities - unremarkable nails and digits  Heme - no bruising, petechiae, pallor  Skin - no rashes or lesions  Psych - appropriate mood and affect      ECOG Performance Status: (foot note - ECOG PS provided by [employer]cology [employer]) 1 - Symptomatic but completely ambulatory    Karnofsky Performance Score:  90%- Able to Carry on Normal Activity: Minor Symptoms of " Disease  DATA:   Lab Results   Component Value Date    WBC 7.32 11/13/2017    HGB 8.7 (L) 11/13/2017    HCT 29.3 (L) 11/13/2017    MCV 72 (L) 11/13/2017     (H) 11/13/2017     Gran #   Date Value Ref Range Status   11/13/2017 5.6 1.8 - 7.7 K/uL Final     Gran%   Date Value Ref Range Status   11/13/2017 76.1 (H) 38.0 - 73.0 % Final     CMP  Sodium   Date Value Ref Range Status   11/13/2017 140 136 - 145 mmol/L Final     Potassium   Date Value Ref Range Status   11/13/2017 4.0 3.5 - 5.1 mmol/L Final     Chloride   Date Value Ref Range Status   11/13/2017 108 95 - 110 mmol/L Final     CO2   Date Value Ref Range Status   11/13/2017 24 23 - 29 mmol/L Final     Glucose   Date Value Ref Range Status   11/13/2017 110 70 - 110 mg/dL Final     BUN, Bld   Date Value Ref Range Status   11/13/2017 14 6 - 20 mg/dL Final     Creatinine   Date Value Ref Range Status   11/13/2017 1.0 0.5 - 1.4 mg/dL Final     Calcium   Date Value Ref Range Status   11/13/2017 9.3 8.7 - 10.5 mg/dL Final     Total Protein   Date Value Ref Range Status   11/13/2017 6.7 6.0 - 8.4 g/dL Final     Albumin   Date Value Ref Range Status   11/13/2017 3.5 3.5 - 5.2 g/dL Final     Total Bilirubin   Date Value Ref Range Status   11/13/2017 0.2 0.1 - 1.0 mg/dL Final     Comment:     For infants and newborns, interpretation of results should be based  on gestational age, weight and in agreement with clinical  observations.  Premature Infant recommended reference ranges:  Up to 24 hours.............<8.0 mg/dL  Up to 48 hours............<12.0 mg/dL  3-5 days..................<15.0 mg/dL  6-29 days.................<15.0 mg/dL       Alkaline Phosphatase   Date Value Ref Range Status   11/13/2017 70 55 - 135 U/L Final     AST   Date Value Ref Range Status   11/13/2017 20 10 - 40 U/L Final     ALT   Date Value Ref Range Status   11/13/2017 18 10 - 44 U/L Final     Anion Gap   Date Value Ref Range Status   11/13/2017 8 8 - 16 mmol/L Final     eGFR if African  American   Date Value Ref Range Status   11/13/2017 >60.0 >60 mL/min/1.73 m^2 Final     eGFR if non    Date Value Ref Range Status   11/13/2017 >60.0 >60 mL/min/1.73 m^2 Final     Comment:     Calculation used to obtain the estimated glomerular filtration  rate (eGFR) is the CKD-EPI equation.        Lab Results   Component Value Date    IRON 35 03/15/2017    TIBC 536 (H) 03/15/2017    FERRITIN 10 (L) 03/15/2017         ASSESSMENT AND PLAN:   Encounter Diagnosis   Name Primary?    Iron deficiency anemia due to chronic blood loss         Microcytic anemia likely ROBERT  due to malabsorption related to GI issues ; possible anemia of chronic inflammation from RA and meds contributing  Denies any over GI bleeding; Recent summer 2017 upper and lower GI endoscopy negative; VCE deferred but may need if ROBERT persists  Intolerant to po iron   Proceed with feraheme weekly x 2 doses   Discussed small <1% risk of potentially serious and fatal allergic reactions with iv iron preparations and patient expressed understanding and wishes to proceed  Recheck labs and md appt in 8 weeks   If anemia persists will perform more in depth anemia evaluation including possible bone marrow biopsy; low clinical index of suspicion for primary bone marrow process   Follow Up:   Schedule Iv feraheme x 2 weekly doses to start In next week  -cbc, ferritin and md appt in 2 months   Michael Woodson MD  Hematology/Oncology/Bone Marrow Transplant

## 2017-11-14 NOTE — PATIENT INSTRUCTIONS
- Instead of Refresh in the bottle, use preservative-free artificial tears (they come in vials)  - Continue serum tears 4x/day  - Continue Restasis 2x/day

## 2017-11-20 ENCOUNTER — PATIENT MESSAGE (OUTPATIENT)
Dept: RHEUMATOLOGY | Facility: CLINIC | Age: 57
End: 2017-11-20

## 2017-12-05 ENCOUNTER — HOSPITAL ENCOUNTER (OUTPATIENT)
Dept: RADIOLOGY | Facility: HOSPITAL | Age: 57
Discharge: HOME | End: 2017-12-05
Attending: PODIATRIST
Payer: MEDICARE

## 2017-12-05 ENCOUNTER — OFFICE VISIT (OUTPATIENT)
Dept: PODIATRY | Facility: CLINIC | Age: 57
End: 2017-12-05
Payer: MEDICARE

## 2017-12-05 VITALS
WEIGHT: 150 LBS | SYSTOLIC BLOOD PRESSURE: 114 MMHG | BODY MASS INDEX: 28.32 KG/M2 | DIASTOLIC BLOOD PRESSURE: 79 MMHG | HEIGHT: 61 IN | HEART RATE: 70 BPM

## 2017-12-05 DIAGNOSIS — M84.474S METATARSAL FRACTURE, PATHOLOGIC, RIGHT, SEQUELA: Primary | ICD-10-CM

## 2017-12-05 DIAGNOSIS — M79.671 RIGHT FOOT PAIN: ICD-10-CM

## 2017-12-05 DIAGNOSIS — M84.474S METATARSAL FRACTURE, PATHOLOGIC, RIGHT, SEQUELA: ICD-10-CM

## 2017-12-05 PROCEDURE — 73630 X-RAY EXAM OF FOOT: CPT | Mod: 26,RT,, | Performed by: RADIOLOGY

## 2017-12-05 PROCEDURE — 99999 PR PBB SHADOW E&M-EST. PATIENT-LVL III: CPT | Mod: PBBFAC,,, | Performed by: PODIATRIST

## 2017-12-05 PROCEDURE — 99213 OFFICE O/P EST LOW 20 MIN: CPT | Mod: PBBFAC,25,PO | Performed by: PODIATRIST

## 2017-12-05 PROCEDURE — 73630 X-RAY EXAM OF FOOT: CPT | Mod: TC,PO,RT

## 2017-12-05 PROCEDURE — 99213 OFFICE O/P EST LOW 20 MIN: CPT | Mod: S$PBB,,, | Performed by: PODIATRIST

## 2017-12-05 NOTE — PROGRESS NOTES
"CC:   Foot pain       HPI:       Joyce Peterson is a 57 y.o. female who presents to clinic complaining of right foot pain  She has history of Rheumatoid Arthritis and osteoporosis.   She reports right foot pain at the lateral forefoot aspect for about 2 weeks without apparent trauma to the are.  She reports that she saw Dr. Steele last week and was given a CAM boot to wear.   She states that the boot is uncomfortable and does not seem to improve the pain at this time.  She reports no fevers or chills.  She will being going on a trip for a few weeks in October and wants to make sure "it's not serious".      8/21/17:  follow up RIGHT foot pain.  MRI shows stress fracture of 3rd metatarsal.  She has been in a Darco stiff sole shoe.  The fracture/CAM boot is uncomfortable to wear.  She reports improvement in pain but wants to make sure there's healing before her trip.   She has history of multiple fractures in her foot.  She has had a bone stimulator in the past due to delayed bone healing.  9/21/17:  Flare up of pain and swelling right foot at the 4th metatarso-phalangeal joint area since yesterday without apparent trauma.    She started wearing her stiff sole shoe again to stabilize the area.    10/3/17:  follow up RIGHT foot 4th metatarsal fracture.  She has history of 3rd metatarsal fracture.   She is wearing her Darco stiff sole shoe.   Unable to tolerate CAM boot.   11/2/17:  follow up right foot.  She has been out of town for a vacation.  Offloaded as much as she could.  Had been alternating between tennis shoes and Darco.  Pain is improving.  She used a bone stimulator for about the first two weeks of her trip.        12/5/17:  follow up right foot metatarsal fracture.   Pain improving but still some soreness present.  She is wearing her Darco fracture shoe today.         Past Medical History:   Diagnosis Date    Acid reflux     Allergy     Anemia     Asthma     Coronary artery disease     " Degenerative disc disease     Dry eyes     Dry mouth     Gastroparesis     Hyperlipidemia     Lateral meniscus derangement 4/6/2016    Osteoarthritis     Osteoporosis     Rheumatoid arthritis(714.0)     Umbilical hernia 8/13/2015             Current Outpatient Prescriptions on File Prior to Visit   Medication Sig Dispense Refill    albuterol 90 mcg/actuation inhaler Inhale 2 puffs into the lungs every 6 (six) hours as needed for Wheezing. 18 g 11    aspirin 81 mg Tab Take 81 mg by mouth every morning.       azelastine (ASTELIN) 137 mcg nasal spray 1 spray (137 mcg total) by Nasal route 2 (two) times daily. (Patient taking differently: 1 spray by Nasal route 2 (two) times daily as needed. ) 30 mL 11    budesonide-formoterol 160-4.5 mcg (SYMBICORT) 160-4.5 mcg/actuation HFAA Inhale 2 puffs into the lungs every 12 (twelve) hours. 3 Inhaler 3    calcium citrate-vitamin D (CITRACAL + D) 315-200 mg-unit per tablet Take 1 tablet by mouth 2 (two) times daily.       CYCLOSPORINE (RESTASIS OPHT) Inject 0.05 % into the eye. 1 Dropperette Both eyes Twice a day .  dispense one month supply/sixty-four vials/ two trays      cyclosporine 0.05 % Drop Apply 1 drop to eye 2 (two) times daily. DISPENSE 3 MONTH SUPPLY 5.5 mL 11    fish oil-omega-3 fatty acids 300-1,000 mg capsule Take 1,400 g by mouth once daily.      flaxseed oil 1,000 mg Cap Take by mouth once daily.      fluticasone (FLONASE) 50 mcg/actuation nasal spray       GUAIFENESIN (MUCINEX ORAL) Take 400 mg by mouth every 4 (four) hours as needed.       INV PROPULSID 10 MG Take 20 mg by mouth 4 (four) times daily as directed by physician.  For investigational use only 480 each 2    leflunomide (ARAVA) 20 MG Tab Take 1 tablet (20 mg total) by mouth once daily. 90 tablet 0    montelukast (SINGULAIR) 10 mg tablet Take 1 tablet (10 mg total) by mouth nightly. 90 tablet 3    naproxen (NAPROSYN) 500 MG tablet Take 1 tablet (500 mg total) by mouth 2 (two)  times daily. 180 tablet 1    omeprazole (PRILOSEC) 40 MG capsule TAKE ONE CAPSULE BY MOUTH EVERY MORNING 30 capsule 6    omeprazole-sodium bicarbonate (ZEGERID) 40-1.1 mg-gram per capsule Take 1 capsule by mouth every morning. 90 capsule 3    POTASSIUM ORAL Take by mouth once daily.      predniSONE (DELTASONE) 1 MG tablet Take 1 mg by mouth once daily.      predniSONE (DELTASONE) 5 MG tablet Take 1 tablet (5 mg total) by mouth once daily. 90 tablet 1    PREMARIN 0.625 mg tablet Take 0.625 mg by mouth once daily.  11    progesterone (PROMETRIUM) 100 MG capsule Take 100 mg by mouth every morning. 1 Capsule Oral Every day, morning      RESTASIS 0.05 % ophthalmic emulsion       rizatriptan (MAXALT) 10 MG tablet Take 10 mg by mouth as needed for Migraine.       rosuvastatin (CRESTOR) 20 MG tablet TAKE 1 TABLET EVERY DAY 90 tablet 3    sucralfate (CARAFATE) 1 gram tablet Take 1 tablet (1 g total) by mouth 4 (four) times daily. 360 tablet 3    tofacitinib (XELJANZ XR) 11 mg Tb24 Take 1 tablet by mouth once daily. 90 tablet 0    tramadol (ULTRAM) 50 mg tablet TAKE 1 TABLET DAILY AS NEEDED PAIN 30 tablet 0    VESICARE 10 mg tablet Take 1 tablet by mouth every morning.        No current facility-administered medications on file prior to visit.            Review of patient's allergies indicates:   Allergen Reactions    Actemra [tocilizumab] Other (See Comments)     Severe dizziness    Codeine Nausea And Vomiting    Gold au 198 Hives and Rash    Hydroxychloroquine Other (See Comments)     Can't remember the reaction      Iodinated contrast media - oral and iv dye Other (See Comments)     Other reaction(s): BURNING ALL OVER    Iodine Other (See Comments)     Other reaction(s): BURNING ALL OVER - Iodine dye - Not topical    Sulfa (sulfonamide antibiotics) Other (See Comments)     Can't remember the reaction    Zofran [ondansetron hcl (pf)] Nausea And Vomiting     Pt reports that last time she received zofran  "she started vomiting again    Methotrexate analogues Nausea Only    Pneumovax 23 [pneumococcal 23-argenis ps vaccine] Other (See Comments)     "sick"           Social History     Social History    Marital status:      Spouse name: N/A    Number of children: N/A    Years of education: N/A     Occupational History    Not on file.     Social History Main Topics    Smoking status: Never Smoker    Smokeless tobacco: Never Used    Alcohol use Yes      Comment: occasionally    Drug use: No    Sexual activity: Yes     Partners: Male     Birth control/ protection: Surgical     Other Topics Concern    Not on file     Social History Narrative    No narrative on file           ROS:  General ROS: negative for  chills, fatigue or fever  Cardiovascular ROS: no chest pain or dyspnea on exertion  Musculoskeletal ROS: negative for joint pain or joint stiffness.  Negative for loss of strength.  Positive for foot pain.   Neuro ROS: Negative for syncope, numbness, or muscle weakness  Skin ROS: Negative for rash, itching or nail/hair changes.           OBJECTIVE:         Vitals:    12/05/17 1315   BP: 114/79   Pulse: 70   Weight: 68 kg (150 lb)   Height: 5' 1" (1.549 m)        Right Lower extremity exam:  Vasc: Palpable pedal pulses. Feet appropriately warm to touch. Cap refill time is within normal limits   Neurological:  Light touch, proprioception, and Sharp/dull sensation are all intact. There is no Tinel's along the tarsal tunnel.    Derm: No open lesions, macerations, or rashes.  MSK:  Minimal enderness to palpation near the 4th metatarsal shaft;  minimal swelling, no bruising or redness noted.  No increased temperature note.        Imagings:      7/27/17: XRAY  Polyarthritis consistent with RA similar to old exam. No acute abnormality.   8/7/17 MRI:  Stress fracture of the 3rd metatarsal.  Periarticular erosions involving the 1st interphalangeal joint and 4th and 5th MTP joints, findings corresponding to this " patient's history of rheumatoid arthritis.  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  9/12/17  MRI:   Continued visualization of abnormal bone marrow signal involving the third metatarsal with a nondisplaced fracture involving the proximal metaphysis of the third metatarsal. There has been interval increased osseous callus when compared to previous MRI dated 8/7/17.  9/21/17 XRAY  3 views    Acute nondisplaced transverse fracture identified involving the fourth metatarsal bone.  Healing fracture of the third metatarsal bone identified in satisfactory position and alignment.  DJD and inflammatory arthropathy again noted.  Pes planus.  10/3/17 xrays 3 views: There are fractures of the third and fourth metatarsals.  There is DJD of flatfoot deformity and cystic change of the fourth metatarsal head probably degenerative.  11/2/17: Comparison: 10/3/17      Findings:Unchanged alignment of the third and fourth metatarsal fractures when compared to most recent prior. There is mild (approximately 1/2 shaft width) displacement laterally of the fourth metatarsal fracture. Minimal interval callus formation when compared to most recent prior. Unchanged pes planus.        12/5/17:  3 views  Healing fracture of the third and fourth metatarsal bones remain in unchanged and satisfactory position as compared to the previous study.  Mild DJD, osteopenia and hallux valgus.  Pes planus.              ASSESSMENT/PLAN:      I counseled the patient on her conditions, their implications and medical management.      Metatarsal fracture, pathologic, right, sequela  -     X-Ray Foot Complete Right; Future; Expected date: 12/05/2017    Right foot pain  -     X-Ray Foot Complete Right; Future; Expected date: 12/05/2017      · xrays were reviewed with the patient    No new fractures   · Continue rest and non-weight bearing as much as possible until next visit.   · follow up in about a month, or sooner if any issues.

## 2017-12-06 ENCOUNTER — OUTPATIENT CASE MANAGEMENT (OUTPATIENT)
Dept: GASTROENTEROLOGY | Facility: CLINIC | Age: 57
End: 2017-12-06

## 2017-12-06 DIAGNOSIS — K31.84 GASTROPARESIS: ICD-10-CM

## 2017-12-15 ENCOUNTER — PATIENT MESSAGE (OUTPATIENT)
Dept: OPTOMETRY | Facility: CLINIC | Age: 57
End: 2017-12-15

## 2017-12-21 ENCOUNTER — TELEPHONE (OUTPATIENT)
Dept: PHARMACY | Facility: CLINIC | Age: 57
End: 2017-12-21

## 2017-12-21 NOTE — TELEPHONE ENCOUNTER
Patient called to request refill of Xeljanz 11mg (90 day supply) and Leflunidmide (30 day supply.     Verified 2 patient identifiers. Reviewed general adherence. Patient has 9 doses of xeljanz and 14 doses of leflunimide remaining and takes them both daily as directed.     Patient will pickup on 12/27- 004.  $5 copay. Patient voiced understanding.     Lianne Barillas, Pharm.D  Specialty Pharmacy Clinical Pharmacist  [facility]  Phone: [phone]

## 2017-12-28 ENCOUNTER — PATIENT MESSAGE (OUTPATIENT)
Dept: GASTROENTEROLOGY | Facility: CLINIC | Age: 57
End: 2017-12-28

## 2018-01-02 ENCOUNTER — LAB VISIT (OUTPATIENT)
Dept: LAB | Facility: HOSPITAL | Age: 58
End: 2018-01-02
Attending: INTERNAL MEDICINE
Payer: MEDICARE

## 2018-01-02 ENCOUNTER — PATIENT MESSAGE (OUTPATIENT)
Dept: RHEUMATOLOGY | Facility: CLINIC | Age: 58
End: 2018-01-02

## 2018-01-02 DIAGNOSIS — M06.9 RHEUMATOID ARTHRITIS OF HAND, UNSPECIFIED LATERALITY, UNSPECIFIED RHEUMATOID FACTOR PRESENCE: ICD-10-CM

## 2018-01-02 DIAGNOSIS — I25.10 CORONARY ARTERY DISEASE INVOLVING NATIVE CORONARY ARTERY OF NATIVE HEART WITHOUT ANGINA PECTORIS: Chronic | ICD-10-CM

## 2018-01-02 DIAGNOSIS — Z79.631 METHOTREXATE, LONG TERM, CURRENT USE: ICD-10-CM

## 2018-01-02 LAB
ALBUMIN SERPL BCP-MCNC: 3.5 G/DL
ALP SERPL-CCNC: 80 U/L
ALT SERPL W/O P-5'-P-CCNC: 24 U/L
ANION GAP SERPL CALC-SCNC: 9 MMOL/L
AST SERPL-CCNC: 27 U/L
BASOPHILS # BLD AUTO: 0.06 K/UL
BASOPHILS NFR BLD: 1.1 %
BILIRUB SERPL-MCNC: 0.2 MG/DL
BUN SERPL-MCNC: 15 MG/DL
CALCIUM SERPL-MCNC: 8.5 MG/DL
CHLORIDE SERPL-SCNC: 109 MMOL/L
CHOLEST SERPL-MCNC: 201 MG/DL
CHOLEST/HDLC SERPL: 2.1 {RATIO}
CO2 SERPL-SCNC: 23 MMOL/L
CREAT SERPL-MCNC: 0.8 MG/DL
CRP SERPL-MCNC: 0.3 MG/L
DIFFERENTIAL METHOD: ABNORMAL
EOSINOPHIL # BLD AUTO: 0 K/UL
EOSINOPHIL NFR BLD: 0.4 %
ERYTHROCYTE [DISTWIDTH] IN BLOOD BY AUTOMATED COUNT: 21.4 %
ERYTHROCYTE [SEDIMENTATION RATE] IN BLOOD BY WESTERGREN METHOD: 4 MM/HR
EST. GFR  (AFRICAN AMERICAN): >60 ML/MIN/1.73 M^2
EST. GFR  (NON AFRICAN AMERICAN): >60 ML/MIN/1.73 M^2
GLUCOSE SERPL-MCNC: 84 MG/DL
HCT VFR BLD AUTO: 31 %
HDLC SERPL-MCNC: 95 MG/DL
HDLC SERPL: 47.3 %
HGB BLD-MCNC: 8.9 G/DL
IMM GRANULOCYTES # BLD AUTO: 0.02 K/UL
IMM GRANULOCYTES NFR BLD AUTO: 0.4 %
LDLC SERPL CALC-MCNC: 71.6 MG/DL
LYMPHOCYTES # BLD AUTO: 2.1 K/UL
LYMPHOCYTES NFR BLD: 36.6 %
MCH RBC QN AUTO: 20.7 PG
MCHC RBC AUTO-ENTMCNC: 28.7 G/DL
MCV RBC AUTO: 72 FL
MONOCYTES # BLD AUTO: 0.9 K/UL
MONOCYTES NFR BLD: 15.7 %
NEUTROPHILS # BLD AUTO: 2.6 K/UL
NEUTROPHILS NFR BLD: 45.8 %
NONHDLC SERPL-MCNC: 106 MG/DL
NRBC BLD-RTO: 0 /100 WBC
PLATELET # BLD AUTO: 419 K/UL
PMV BLD AUTO: 9.3 FL
POTASSIUM SERPL-SCNC: 3.9 MMOL/L
PROT SERPL-MCNC: 6.6 G/DL
RBC # BLD AUTO: 4.29 M/UL
SODIUM SERPL-SCNC: 141 MMOL/L
TRIGL SERPL-MCNC: 172 MG/DL
WBC # BLD AUTO: 5.66 K/UL

## 2018-01-02 PROCEDURE — 80061 LIPID PANEL: CPT

## 2018-01-02 PROCEDURE — 86140 C-REACTIVE PROTEIN: CPT

## 2018-01-02 PROCEDURE — 85025 COMPLETE CBC W/AUTO DIFF WBC: CPT

## 2018-01-02 PROCEDURE — 80053 COMPREHEN METABOLIC PANEL: CPT

## 2018-01-02 PROCEDURE — 85651 RBC SED RATE NONAUTOMATED: CPT

## 2018-01-02 PROCEDURE — 36415 COLL VENOUS BLD VENIPUNCTURE: CPT | Mod: PO

## 2018-01-03 ENCOUNTER — TELEPHONE (OUTPATIENT)
Dept: PULMONOLOGY | Facility: CLINIC | Age: 58
End: 2018-01-03

## 2018-01-03 ENCOUNTER — PATIENT MESSAGE (OUTPATIENT)
Dept: RHEUMATOLOGY | Facility: CLINIC | Age: 58
End: 2018-01-03

## 2018-01-03 ENCOUNTER — TELEPHONE (OUTPATIENT)
Dept: OPHTHALMOLOGY | Facility: CLINIC | Age: 58
End: 2018-01-03

## 2018-01-03 DIAGNOSIS — D50.0 IRON DEFICIENCY ANEMIA DUE TO CHRONIC BLOOD LOSS: Primary | ICD-10-CM

## 2018-01-03 RX ORDER — HEPARIN 100 UNIT/ML
500 SYRINGE INTRAVENOUS
Status: CANCELLED | OUTPATIENT
Start: 2018-01-03

## 2018-01-03 RX ORDER — SODIUM CHLORIDE 0.9 % (FLUSH) 0.9 %
10 SYRINGE (ML) INJECTION
Status: CANCELLED | OUTPATIENT
Start: 2018-01-11

## 2018-01-03 RX ORDER — HEPARIN 100 UNIT/ML
500 SYRINGE INTRAVENOUS
Status: CANCELLED | OUTPATIENT
Start: 2018-01-11

## 2018-01-03 RX ORDER — SODIUM CHLORIDE 0.9 % (FLUSH) 0.9 %
10 SYRINGE (ML) INJECTION
Status: CANCELLED | OUTPATIENT
Start: 2018-01-03

## 2018-01-04 ENCOUNTER — HOSPITAL ENCOUNTER (OUTPATIENT)
Dept: RADIOLOGY | Facility: HOSPITAL | Age: 58
Discharge: HOME | End: 2018-01-04
Attending: PODIATRIST
Payer: MEDICARE

## 2018-01-04 ENCOUNTER — TELEPHONE (OUTPATIENT)
Dept: HEMATOLOGY/ONCOLOGY | Facility: CLINIC | Age: 58
End: 2018-01-04

## 2018-01-04 ENCOUNTER — TELEPHONE (OUTPATIENT)
Dept: OPTOMETRY | Facility: CLINIC | Age: 58
End: 2018-01-04

## 2018-01-04 ENCOUNTER — OFFICE VISIT (OUTPATIENT)
Dept: PODIATRY | Facility: CLINIC | Age: 58
End: 2018-01-04
Payer: MEDICARE

## 2018-01-04 VITALS
HEIGHT: 61 IN | BODY MASS INDEX: 28.32 KG/M2 | SYSTOLIC BLOOD PRESSURE: 149 MMHG | WEIGHT: 150 LBS | HEART RATE: 70 BPM | DIASTOLIC BLOOD PRESSURE: 70 MMHG

## 2018-01-04 DIAGNOSIS — M79.671 RIGHT FOOT PAIN: ICD-10-CM

## 2018-01-04 DIAGNOSIS — M84.474S METATARSAL FRACTURE, PATHOLOGIC, RIGHT, SEQUELA: Primary | ICD-10-CM

## 2018-01-04 DIAGNOSIS — M06.9 RHEUMATOID ARTHRITIS INVOLVING MULTIPLE SITES, UNSPECIFIED RHEUMATOID FACTOR PRESENCE: ICD-10-CM

## 2018-01-04 DIAGNOSIS — M21.41 PES PLANUS OF BOTH FEET: ICD-10-CM

## 2018-01-04 DIAGNOSIS — M84.474S METATARSAL FRACTURE, PATHOLOGIC, RIGHT, SEQUELA: ICD-10-CM

## 2018-01-04 DIAGNOSIS — M21.42 PES PLANUS OF BOTH FEET: ICD-10-CM

## 2018-01-04 PROCEDURE — 73630 X-RAY EXAM OF FOOT: CPT | Mod: 26,RT,, | Performed by: RADIOLOGY

## 2018-01-04 PROCEDURE — 99213 OFFICE O/P EST LOW 20 MIN: CPT | Mod: S$GLB,,, | Performed by: PODIATRIST

## 2018-01-04 PROCEDURE — 99999 PR PBB SHADOW E&M-EST. PATIENT-LVL III: CPT | Mod: PBBFAC,,, | Performed by: PODIATRIST

## 2018-01-04 PROCEDURE — 73630 X-RAY EXAM OF FOOT: CPT | Mod: TC,PO,RT

## 2018-01-04 NOTE — PROGRESS NOTES
"CC:   Foot pain       HPI:       Joyce Peterson is a 57 y.o. female who presents to clinic complaining of right foot pain  She has history of Rheumatoid Arthritis and osteoporosis.   She reports right foot pain at the lateral forefoot aspect for about 2 weeks without apparent trauma to the are.  She reports that she saw Dr. Steele last week and was given a CAM boot to wear.   She states that the boot is uncomfortable and does not seem to improve the pain at this time.  She reports no fevers or chills.  She will being going on a trip for a few weeks in October and wants to make sure "it's not serious".      8/21/17:  follow up RIGHT foot pain.  MRI shows stress fracture of 3rd metatarsal.  She has been in a Darco stiff sole shoe.  The fracture/CAM boot is uncomfortable to wear.  She reports improvement in pain but wants to make sure there's healing before her trip.   She has history of multiple fractures in her foot.  She has had a bone stimulator in the past due to delayed bone healing.  9/21/17:  Flare up of pain and swelling right foot at the 4th metatarso-phalangeal joint area since yesterday without apparent trauma.    She started wearing her stiff sole shoe again to stabilize the area.    10/3/17:  follow up RIGHT foot 4th metatarsal fracture.  She has history of 3rd metatarsal fracture.   She is wearing her Darco stiff sole shoe.   Unable to tolerate CAM boot.   11/2/17:  follow up right foot.  She has been out of town for a vacation.  Offloaded as much as she could.  Had been alternating between tennis shoes and Darco.  Pain is improving.  She used a bone stimulator for about the first two weeks of her trip.    12/5/17:  follow up right foot metatarsal fracture.   Pain improving but still some soreness present.  She is wearing her Darco fracture shoe today.        1/4/18:  follow up right foot pathological metatarsal fracture.  Now notices pain 5th toe area in the past week.  Has increased activities a " little bit.         Past Medical History:   Diagnosis Date    Acid reflux     Allergy     Anemia     Asthma     Coronary artery disease     Degenerative disc disease     Dry eyes     Dry mouth     Gastroparesis     Hyperlipidemia     Lateral meniscus derangement 4/6/2016    Osteoarthritis     Osteoporosis     Rheumatoid arthritis(714.0)     Umbilical hernia 8/13/2015             Current Outpatient Prescriptions on File Prior to Visit   Medication Sig Dispense Refill    albuterol 90 mcg/actuation inhaler Inhale 2 puffs into the lungs every 6 (six) hours as needed for Wheezing. 18 g 11    aspirin 81 mg Tab Take 81 mg by mouth every morning.       azelastine (ASTELIN) 137 mcg nasal spray 1 spray (137 mcg total) by Nasal route 2 (two) times daily. (Patient taking differently: 1 spray by Nasal route 2 (two) times daily as needed. ) 30 mL 11    budesonide-formoterol 160-4.5 mcg (SYMBICORT) 160-4.5 mcg/actuation HFAA Inhale 2 puffs into the lungs every 12 (twelve) hours. 3 Inhaler 3    calcium citrate-vitamin D (CITRACAL + D) 315-200 mg-unit per tablet Take 1 tablet by mouth 2 (two) times daily.       CYCLOSPORINE (RESTASIS OPHT) Inject 0.05 % into the eye. 1 Dropperette Both eyes Twice a day .  dispense one month supply/sixty-four vials/ two trays      cyclosporine 0.05 % Drop Apply 1 drop to eye 2 (two) times daily. DISPENSE 3 MONTH SUPPLY 5.5 mL 11    fish oil-omega-3 fatty acids 300-1,000 mg capsule Take 1,400 g by mouth once daily.      flaxseed oil 1,000 mg Cap Take by mouth once daily.      fluticasone (FLONASE) 50 mcg/actuation nasal spray       GUAIFENESIN (MUCINEX ORAL) Take 400 mg by mouth every 4 (four) hours as needed.       INV PROPULSID 10 MG Take 20 mg by mouth 4 (four) times daily as directed by physician.  For investigational use only 480 each 2    leflunomide (ARAVA) 20 MG Tab Take 1 tablet (20 mg total) by mouth once daily. 90 tablet 0    montelukast (SINGULAIR) 10 mg tablet  Take 1 tablet (10 mg total) by mouth nightly. 90 tablet 3    naproxen (NAPROSYN) 500 MG tablet Take 1 tablet (500 mg total) by mouth 2 (two) times daily. 180 tablet 1    omeprazole (PRILOSEC) 40 MG capsule TAKE ONE CAPSULE BY MOUTH EVERY MORNING 30 capsule 6    omeprazole-sodium bicarbonate (ZEGERID) 40-1.1 mg-gram per capsule Take 1 capsule by mouth every morning. 90 capsule 3    POTASSIUM ORAL Take by mouth once daily.      predniSONE (DELTASONE) 1 MG tablet Take 1 mg by mouth once daily.      predniSONE (DELTASONE) 5 MG tablet Take 1 tablet (5 mg total) by mouth once daily. 90 tablet 1    PREMARIN 0.625 mg tablet Take 0.625 mg by mouth once daily.  11    progesterone (PROMETRIUM) 100 MG capsule Take 100 mg by mouth every morning. 1 Capsule Oral Every day, morning      RESTASIS 0.05 % ophthalmic emulsion       rizatriptan (MAXALT) 10 MG tablet Take 10 mg by mouth as needed for Migraine.       rosuvastatin (CRESTOR) 20 MG tablet TAKE 1 TABLET EVERY DAY 90 tablet 3    sucralfate (CARAFATE) 1 gram tablet Take 1 tablet (1 g total) by mouth 4 (four) times daily. 360 tablet 3    tofacitinib (XELJANZ XR) 11 mg Tb24 Take 1 tablet by mouth once daily. 90 tablet 0    tramadol (ULTRAM) 50 mg tablet TAKE 1 TABLET DAILY AS NEEDED PAIN 30 tablet 0    VESICARE 10 mg tablet Take 1 tablet by mouth every morning.        No current facility-administered medications on file prior to visit.            Review of patient's allergies indicates:   Allergen Reactions    Actemra [tocilizumab] Other (See Comments)     Severe dizziness    Codeine Nausea And Vomiting    Gold au 198 Hives and Rash    Hydroxychloroquine Other (See Comments)     Can't remember the reaction      Iodinated contrast media - oral and iv dye Other (See Comments)     Other reaction(s): BURNING ALL OVER    Iodine Other (See Comments)     Other reaction(s): BURNING ALL OVER - Iodine dye - Not topical    Sulfa (sulfonamide antibiotics) Other (See  "Comments)     Can't remember the reaction    Zofran [ondansetron hcl (pf)] Nausea And Vomiting     Pt reports that last time she received zofran she started vomiting again    Methotrexate analogues Nausea Only    Pneumovax 23 [pneumococcal 23-argenis ps vaccine] Other (See Comments)     "sick"           Social History     Social History    Marital status:      Spouse name: N/A    Number of children: N/A    Years of education: N/A     Occupational History    Not on file.     Social History Main Topics    Smoking status: Never Smoker    Smokeless tobacco: Never Used    Alcohol use Yes      Comment: occasionally    Drug use: No    Sexual activity: Yes     Partners: Male     Birth control/ protection: Surgical     Other Topics Concern    Not on file     Social History Narrative    No narrative on file           ROS:  General ROS: negative for  chills, fatigue or fever  Cardiovascular ROS: no chest pain or dyspnea on exertion  Musculoskeletal ROS: negative for joint pain or joint stiffness.  Negative for loss of strength.  Positive for foot pain.   Neuro ROS: Negative for syncope, numbness, or muscle weakness  Skin ROS: Negative for rash, itching or nail/hair changes.           OBJECTIVE:         Vitals:    01/04/18 1321   BP: (!) 149/70   Pulse: 70   Weight: 68 kg (150 lb)   Height: 5' 1" (1.549 m)        Right Lower extremity exam:  Vasc: Palpable pedal pulses. Feet appropriately warm to touch. Cap refill time is within normal limits   Neurological:  Light touch, proprioception, and Sharp/dull sensation are all intact. There is no Tinel's along the tarsal tunnel.    Derm: No open lesions, macerations, or rashes.  MSK:  Minimal enderness to palpation near the 4th metatarsal shaft;  minimal swelling, no bruising or redness noted.  No increased temperature note.        Imagings:    7/27/17: XRAY  Polyarthritis consistent with RA similar to old exam. No acute abnormality.   8/7/17 MRI:  Stress fracture of " the 3rd metatarsal.  Periarticular erosions involving the 1st interphalangeal joint and 4th and 5th MTP joints, findings corresponding to this patient's history of rheumatoid arthritis.  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  9/12/17  MRI:   Continued visualization of abnormal bone marrow signal involving the third metatarsal with a nondisplaced fracture involving the proximal metaphysis of the third metatarsal. There has been interval increased osseous callus when compared to previous MRI dated 8/7/17.  9/21/17 XRAY  3 views    Acute nondisplaced transverse fracture identified involving the fourth metatarsal bone.  Healing fracture of the third metatarsal bone identified in satisfactory position and alignment.  DJD and inflammatory arthropathy again noted.  Pes planus.  10/3/17 xrays 3 views: There are fractures of the third and fourth metatarsals.  There is DJD of flatfoot deformity and cystic change of the fourth metatarsal head probably degenerative.  11/2/17: Comparison: 10/3/17      Findings:Unchanged alignment of the third and fourth metatarsal fractures when compared to most recent prior. There is mild (approximately 1/2 shaft width) displacement laterally of the fourth metatarsal fracture. Minimal interval callus formation when compared to most recent prior. Unchanged pes planus.  12/5/17:  3 views    Healing fracture of the third and fourth metatarsal bones remain in unchanged and satisfactory position as compared to the previous study.  Mild DJD, osteopenia and hallux valgus.  Pes planus.    1/4/18   Narrative     3 views    Healing fracture of the third and fourth metatarsal bone remain unchanged position as compared to the previous study.  DJD.               ASSESSMENT/PLAN:      I counseled the patient on her conditions, their implications and medical management.      Metatarsal fracture, pathologic, right, sequela  -     X-Ray Foot Complete Right; Future; Expected  date: 01/04/2018    Right foot pain  -     X-Ray Foot Complete Right; Future; Expected date: 01/04/2018         · xrays were reviewed with the patient    No new fractures   · Continue rest and non-weight bearing as much as possible.  Monitor for worsening signs and symptoms  · follow up 2 months, or sooner if concerned.

## 2018-01-04 NOTE — TELEPHONE ENCOUNTER
----- Message from Garo Kelley sent at 1/4/2018  3:50 PM CST -----  Contact: Pt   Pt will like a call regarding if she was approved by her insurance for 1/10 infusion appt     Contact::[phone]  Spoke with pt at 434pm on 01/04/18, explained that her insurance company has already approved infusions, pt verbalized understanding.  Blessing

## 2018-01-08 ENCOUNTER — OFFICE VISIT (OUTPATIENT)
Dept: CARDIOLOGY | Facility: CLINIC | Age: 58
End: 2018-01-08
Payer: MEDICARE

## 2018-01-08 VITALS
DIASTOLIC BLOOD PRESSURE: 61 MMHG | SYSTOLIC BLOOD PRESSURE: 132 MMHG | HEART RATE: 91 BPM | BODY MASS INDEX: 28.72 KG/M2 | WEIGHT: 152.13 LBS | HEIGHT: 61 IN

## 2018-01-08 DIAGNOSIS — I25.10 CORONARY ARTERY DISEASE INVOLVING NATIVE CORONARY ARTERY OF NATIVE HEART WITHOUT ANGINA PECTORIS: Primary | ICD-10-CM

## 2018-01-08 DIAGNOSIS — E78.00 PURE HYPERCHOLESTEROLEMIA: ICD-10-CM

## 2018-01-08 DIAGNOSIS — M06.9 RHEUMATOID ARTHRITIS INVOLVING MULTIPLE SITES, UNSPECIFIED RHEUMATOID FACTOR PRESENCE: ICD-10-CM

## 2018-01-08 PROCEDURE — 99999 PR PBB SHADOW E&M-EST. PATIENT-LVL III: CPT | Mod: PBBFAC,,, | Performed by: INTERNAL MEDICINE

## 2018-01-08 PROCEDURE — 99214 OFFICE O/P EST MOD 30 MIN: CPT | Mod: S$GLB,,, | Performed by: INTERNAL MEDICINE

## 2018-01-08 NOTE — PROGRESS NOTES
Subjective:    Patient ID:  Joyce Peterson is a 57 y.o. female who presents for follow-up of CAD based on elevated CAC    HPI   57 year old female followed by Dr Moran with RA has a CAD of 123 warren 2010. Last stress test in 2013 remained negative for ischemia. She continues to do well. She has had recent 2 episodes non exertional sharp chest pain.     Lab Results   Component Value Date     01/02/2018    K 3.9 01/02/2018     01/02/2018    CO2 23 01/02/2018    BUN 15 01/02/2018    CREATININE 0.8 01/02/2018    GLU 84 01/02/2018    HGBA1C 6.0 02/06/2017    MG 1.9 11/13/2017    AST 27 01/02/2018    ALT 24 01/02/2018    ALBUMIN 3.5 01/02/2018    PROT 6.6 01/02/2018    BILITOT 0.2 01/02/2018    WBC 5.66 01/02/2018    HGB 8.9 (L) 01/02/2018    HCT 31.0 (L) 01/02/2018    MCV 72 (L) 01/02/2018     (H) 01/02/2018    INR 1.0 12/16/2015    TSH 0.638 08/11/2017         Lab Results   Component Value Date    CHOL 201 (H) 01/02/2018    HDL 95 (H) 01/02/2018    TRIG 172 (H) 01/02/2018       Lab Results   Component Value Date    LDLCALC 71.6 01/02/2018       Past Medical History:   Diagnosis Date    Acid reflux     Allergy     Anemia     Asthma     Coronary artery disease     Degenerative disc disease     Dry eyes     Dry mouth     Gastroparesis     Hyperlipidemia     Lateral meniscus derangement 4/6/2016    Osteoarthritis     Osteoporosis     Rheumatoid arthritis(714.0)     Umbilical hernia 8/13/2015       Current Outpatient Prescriptions:     albuterol 90 mcg/actuation inhaler, Inhale 2 puffs into the lungs every 6 (six) hours as needed for Wheezing., Disp: 18 g, Rfl: 11    aspirin 81 mg Tab, Take 81 mg by mouth every morning. , Disp: , Rfl:     azelastine (ASTELIN) 137 mcg nasal spray, 1 spray (137 mcg total) by Nasal route 2 (two) times daily. (Patient taking differently: 1 spray by Nasal route 2 (two) times daily as needed. ), Disp: 30 mL, Rfl: 11    budesonide-formoterol 160-4.5 mcg  (SYMBICORT) 160-4.5 mcg/actuation HFAA, Inhale 2 puffs into the lungs every 12 (twelve) hours., Disp: 3 Inhaler, Rfl: 3    calcium citrate-vitamin D (CITRACAL + D) 315-200 mg-unit per tablet, Take 1 tablet by mouth 2 (two) times daily. , Disp: , Rfl:     CYCLOSPORINE (RESTASIS OPHT), Inject 0.05 % into the eye. 1 Dropperette Both eyes Twice a day .  dispense one month supply/sixty-four vials/ two trays, Disp: , Rfl:     cyclosporine 0.05 % Drop, Apply 1 drop to eye 2 (two) times daily. DISPENSE 3 MONTH SUPPLY, Disp: 5.5 mL, Rfl: 11    fish oil-omega-3 fatty acids 300-1,000 mg capsule, Take 1,400 g by mouth once daily., Disp: , Rfl:     flaxseed oil 1,000 mg Cap, Take by mouth once daily., Disp: , Rfl:     fluticasone (FLONASE) 50 mcg/actuation nasal spray, , Disp: , Rfl:     GUAIFENESIN (MUCINEX ORAL), Take 400 mg by mouth every 4 (four) hours as needed. , Disp: , Rfl:     INV PROPULSID 10 MG, Take 20 mg by mouth 4 (four) times daily as directed by physician.  For investigational use only, Disp: 480 each, Rfl: 2    leflunomide (ARAVA) 20 MG Tab, Take 1 tablet (20 mg total) by mouth once daily., Disp: 90 tablet, Rfl: 0    montelukast (SINGULAIR) 10 mg tablet, Take 1 tablet (10 mg total) by mouth nightly., Disp: 90 tablet, Rfl: 3    naproxen (NAPROSYN) 500 MG tablet, Take 1 tablet (500 mg total) by mouth 2 (two) times daily., Disp: 180 tablet, Rfl: 1    omeprazole (PRILOSEC) 40 MG capsule, TAKE ONE CAPSULE BY MOUTH EVERY MORNING, Disp: 30 capsule, Rfl: 6    omeprazole-sodium bicarbonate (ZEGERID) 40-1.1 mg-gram per capsule, Take 1 capsule by mouth every morning., Disp: 90 capsule, Rfl: 3    POTASSIUM ORAL, Take by mouth once daily., Disp: , Rfl:     predniSONE (DELTASONE) 1 MG tablet, Take 1 mg by mouth once daily., Disp: , Rfl:     predniSONE (DELTASONE) 5 MG tablet, Take 1 tablet (5 mg total) by mouth once daily., Disp: 90 tablet, Rfl: 1    PREMARIN 0.625 mg tablet, Take 0.625 mg by mouth once  daily., Disp: , Rfl: 11    progesterone (PROMETRIUM) 100 MG capsule, Take 100 mg by mouth every morning. 1 Capsule Oral Every day, morning, Disp: , Rfl:     RESTASIS 0.05 % ophthalmic emulsion, , Disp: , Rfl:     rizatriptan (MAXALT) 10 MG tablet, Take 10 mg by mouth as needed for Migraine. , Disp: , Rfl:     rosuvastatin (CRESTOR) 20 MG tablet, TAKE 1 TABLET EVERY DAY, Disp: 90 tablet, Rfl: 3    sucralfate (CARAFATE) 1 gram tablet, Take 1 tablet (1 g total) by mouth 4 (four) times daily., Disp: 360 tablet, Rfl: 3    tofacitinib (XELJANZ XR) 11 mg Tb24, Take 1 tablet by mouth once daily., Disp: 90 tablet, Rfl: 0    tramadol (ULTRAM) 50 mg tablet, TAKE 1 TABLET DAILY AS NEEDED PAIN, Disp: 30 tablet, Rfl: 0    VESICARE 10 mg tablet, Take 1 tablet by mouth every morning. , Disp: , Rfl:         Review of Systems   Constitution: Negative for decreased appetite, diaphoresis, fever, weakness, malaise/fatigue, weight gain and weight loss.   HENT: Negative for congestion, ear discharge, ear pain and nosebleeds.    Eyes: Negative for blurred vision, double vision and visual disturbance.   Cardiovascular: Negative for chest pain, claudication, cyanosis, dyspnea on exertion, irregular heartbeat, leg swelling, near-syncope, orthopnea, palpitations, paroxysmal nocturnal dyspnea and syncope.   Respiratory: Negative for cough, hemoptysis, shortness of breath, sleep disturbances due to breathing, snoring, sputum production and wheezing.    Endocrine: Negative for polydipsia, polyphagia and polyuria.   Hematologic/Lymphatic: Negative for adenopathy and bleeding problem. Does not bruise/bleed easily.   Skin: Negative for color change, nail changes, poor wound healing and rash.   Musculoskeletal: Negative for muscle cramps and muscle weakness.   Gastrointestinal: Negative for abdominal pain, anorexia, change in bowel habit, hematochezia, nausea and vomiting.   Genitourinary: Negative for dysuria, frequency and hematuria.  "  Neurological: Negative for brief paralysis, difficulty with concentration, excessive daytime sleepiness, dizziness, focal weakness, headaches, light-headedness, seizures and vertigo.   Psychiatric/Behavioral: Negative for altered mental status and depression.   Allergic/Immunologic: Negative for persistent infections.        Objective:/61 (BP Location: Left arm, Patient Position: Sitting, BP Method: Medium (Automatic))   Pulse 91   Ht 5' 1" (1.549 m)   Wt 69 kg (152 lb 1.9 oz)   LMP  (LMP Unknown)   BMI 28.74 kg/m²           Physical Exam   Constitutional: She is oriented to person, place, and time. She appears well-developed and well-nourished.   HENT:   Head: Normocephalic.   Right Ear: External ear normal.   Left Ear: External ear normal.   Nose: Nose normal.   Inspection of lips, teeth and gums normal   Eyes: Conjunctivae and EOM are normal. Pupils are equal, round, and reactive to light. No scleral icterus.   Neck: Normal range of motion. No JVD present. No tracheal deviation present. No thyromegaly present.   Cardiovascular: Normal rate, regular rhythm, normal heart sounds and intact distal pulses.  Exam reveals no gallop and no friction rub.    No murmur heard.  Pulses:       Carotid pulses are 2+ on the right side, and 2+ on the left side.       Dorsalis pedis pulses are 2+ on the left side.        Posterior tibial pulses are 2+ on the right side, and 2+ on the left side.   Pulmonary/Chest: Effort normal and breath sounds normal. No respiratory distress. She has no wheezes. She has no rales. She exhibits no tenderness.   Abdominal: Soft. Bowel sounds are normal. She exhibits no distension. There is no hepatosplenomegaly. There is no tenderness. There is no guarding.   Musculoskeletal: Normal range of motion. She exhibits no edema or tenderness.   Lymphadenopathy:   Palpation of lymph nodes of neck and groin normal   Neurological: She is oriented to person, place, and time. No cranial nerve " deficit. She exhibits normal muscle tone. Coordination normal.   Skin: Skin is warm and dry. No rash noted. No erythema. No pallor.   Palpation of skin normal   Psychiatric: She has a normal mood and affect. Her behavior is normal. Judgment and thought content normal.         Assessment:       1. Coronary artery disease involving native coronary artery of native heart without angina pectoris    2. Pure hypercholesterolemia    3. Rheumatoid arthritis involving multiple sites, unspecified rheumatoid factor presence         Plan:       Joyce was seen today for coronary artery disease and chest pain.    Diagnoses and all orders for this visit:    Coronary artery disease involving native coronary artery of native heart without angina pectoris  -     Lipid panel; Future; Expected date: 01/08/2019    Pure hypercholesterolemia  -     Lipid panel; Future; Expected date: 01/08/2019    Rheumatoid arthritis involving multiple sites, unspecified rheumatoid factor presence

## 2018-01-10 ENCOUNTER — INFUSION (OUTPATIENT)
Dept: INFUSION THERAPY | Facility: HOSPITAL | Age: 58
End: 2018-01-10
Attending: INTERNAL MEDICINE
Payer: MEDICARE

## 2018-01-10 VITALS
RESPIRATION RATE: 20 BRPM | DIASTOLIC BLOOD PRESSURE: 60 MMHG | SYSTOLIC BLOOD PRESSURE: 107 MMHG | TEMPERATURE: 99 F | HEART RATE: 71 BPM

## 2018-01-10 DIAGNOSIS — D50.0 IRON DEFICIENCY ANEMIA DUE TO CHRONIC BLOOD LOSS: Primary | ICD-10-CM

## 2018-01-10 PROCEDURE — 96374 THER/PROPH/DIAG INJ IV PUSH: CPT

## 2018-01-10 PROCEDURE — 25000003 PHARM REV CODE 250: Performed by: INTERNAL MEDICINE

## 2018-01-10 PROCEDURE — 63600175 PHARM REV CODE 636 W HCPCS: Mod: JG | Performed by: INTERNAL MEDICINE

## 2018-01-10 RX ORDER — SODIUM CHLORIDE 0.9 % (FLUSH) 0.9 %
10 SYRINGE (ML) INJECTION
Status: DISCONTINUED | OUTPATIENT
Start: 2018-01-10 | End: 2018-01-10 | Stop reason: HOSPADM

## 2018-01-10 RX ORDER — HEPARIN 100 UNIT/ML
500 SYRINGE INTRAVENOUS
Status: DISCONTINUED | OUTPATIENT
Start: 2018-01-10 | End: 2018-01-10 | Stop reason: HOSPADM

## 2018-01-10 RX ADMIN — SODIUM CHLORIDE: 900 INJECTION, SOLUTION INTRAVENOUS at 04:01

## 2018-01-10 RX ADMIN — FERUMOXYTOL 510 MG: 510 INJECTION INTRAVENOUS at 04:01

## 2018-01-10 NOTE — PLAN OF CARE
Problem: Patient Care Overview (Adult)  Goal: Plan of Care Review  Outcome: Ongoing (interventions implemented as appropriate)  Feraheme currently infusing with no complications. VSS. NAD. Report given to Monica OWENS RN.

## 2018-01-11 RX ORDER — FLUCONAZOLE 100 MG/1
100 TABLET ORAL DAILY
Qty: 14 TABLET | Refills: 3 | Status: SHIPPED | OUTPATIENT
Start: 2018-01-11 | End: 2018-05-22 | Stop reason: SDUPTHER

## 2018-01-11 NOTE — PLAN OF CARE
Problem: Patient Care Overview (Adult)  Goal: Plan of Care Review  Outcome: Ongoing (interventions implemented as appropriate)  Pt. Tolerated infusion and hour of post- observation. No questions at this time. VSS> PIV removed.

## 2018-01-15 ENCOUNTER — INFUSION (OUTPATIENT)
Dept: INFUSION THERAPY | Facility: HOSPITAL | Age: 58
End: 2018-01-15
Attending: INTERNAL MEDICINE
Payer: MEDICARE

## 2018-01-15 ENCOUNTER — OFFICE VISIT (OUTPATIENT)
Dept: HEMATOLOGY/ONCOLOGY | Facility: CLINIC | Age: 58
End: 2018-01-15
Payer: MEDICARE

## 2018-01-15 VITALS
RESPIRATION RATE: 16 BRPM | HEART RATE: 102 BPM | OXYGEN SATURATION: 100 % | HEART RATE: 98 BPM | SYSTOLIC BLOOD PRESSURE: 122 MMHG | RESPIRATION RATE: 17 BRPM | DIASTOLIC BLOOD PRESSURE: 73 MMHG | TEMPERATURE: 98 F | DIASTOLIC BLOOD PRESSURE: 70 MMHG | SYSTOLIC BLOOD PRESSURE: 150 MMHG | WEIGHT: 149.06 LBS | BODY MASS INDEX: 28.16 KG/M2 | TEMPERATURE: 98 F

## 2018-01-15 DIAGNOSIS — D50.0 IRON DEFICIENCY ANEMIA DUE TO CHRONIC BLOOD LOSS: Primary | ICD-10-CM

## 2018-01-15 DIAGNOSIS — D50.9 IRON DEFICIENCY ANEMIA, UNSPECIFIED IRON DEFICIENCY ANEMIA TYPE: Primary | ICD-10-CM

## 2018-01-15 PROCEDURE — 99999 PR PBB SHADOW E&M-EST. PATIENT-LVL V: CPT | Mod: PBBFAC,,, | Performed by: INTERNAL MEDICINE

## 2018-01-15 PROCEDURE — 99214 OFFICE O/P EST MOD 30 MIN: CPT | Mod: S$GLB,,, | Performed by: INTERNAL MEDICINE

## 2018-01-15 PROCEDURE — 96365 THER/PROPH/DIAG IV INF INIT: CPT

## 2018-01-15 PROCEDURE — 25000003 PHARM REV CODE 250: Performed by: INTERNAL MEDICINE

## 2018-01-15 PROCEDURE — 63600175 PHARM REV CODE 636 W HCPCS: Mod: JG | Performed by: INTERNAL MEDICINE

## 2018-01-15 RX ORDER — HEPARIN 100 UNIT/ML
500 SYRINGE INTRAVENOUS
Status: DISCONTINUED | OUTPATIENT
Start: 2018-01-15 | End: 2018-01-15 | Stop reason: HOSPADM

## 2018-01-15 RX ORDER — SODIUM CHLORIDE 0.9 % (FLUSH) 0.9 %
10 SYRINGE (ML) INJECTION
Status: DISCONTINUED | OUTPATIENT
Start: 2018-01-15 | End: 2018-01-15 | Stop reason: HOSPADM

## 2018-01-15 RX ORDER — BENZONATATE 100 MG/1
100 CAPSULE ORAL 3 TIMES DAILY
Refills: 0 | COMMUNITY
Start: 2018-01-05 | End: 2018-05-21 | Stop reason: ALTCHOICE

## 2018-01-15 RX ADMIN — FERUMOXYTOL 510 MG: 510 INJECTION INTRAVENOUS at 03:01

## 2018-01-15 NOTE — PROGRESS NOTES
SECTION OF HEMATOLOGY AND BONE MARROW TRANSPLANT  Return  Patient Visit   01/19/2018  Referred by:  No ref. provider found  Referred for: ROBERT    CHIEF COMPLAINT:   No chief complaint on file.      HISTORY OF PRESENT ILLNESS:   Referred to me November 2017  for ROBERT.  Intolerant to po iron.  Iron studies in march 2017 with severe ID.  History RA followed by Dr. Valverde.  History of gastroparesis followed by Dr. Moore in GI.  Denies fever, chills, nightsweats, bleeding, brusing, lymphadenopathy, signs/symptoms of splenomegaly.   Had upper and lower endoscopy summer 2017 unremarkable.  No VCE done.  Denies any overt GI bleeding or other bleeding.    IV iron was ordered to be adminstered after our last appt but was not scheduled until recently and she received her first dose last week.  Tolerated with no issues.  Will have her 2 nd dose of feraheme today . No change in clinical status since our last appt.        PAST MEDICAL HISTORY:   Past Medical History:   Diagnosis Date    Acid reflux     Allergy     Anemia     Asthma     Coronary artery disease     Degenerative disc disease     Dry eyes     Dry mouth     Gastroparesis     Hyperlipidemia     Lateral meniscus derangement 4/6/2016    Osteoarthritis     Osteoporosis     Rheumatoid arthritis(714.0)     Umbilical hernia 8/13/2015       PAST SURGICAL HISTORY:   Past Surgical History:   Procedure Laterality Date    BREAST BIOPSY Left 01/29/2002    core bx    CARPAL TUNNEL RELEASE Right 05/2017    CHOLECYSTECTOMY  2004    COLONOSCOPY      10/11    COLONOSCOPY N/A 6/29/2017    Procedure: COLONOSCOPY;  Surgeon: López Moore MD;  Location: [facility] ([address] [place] [address]);  Service: Endoscopy;  Laterality: N/A;    TONSILLECTOMY      TUBAL LIGATION  2003    UPPER GASTROINTESTINAL ENDOSCOPY      10/11    uterine ablation  2003       PAST SOCIAL HISTORY:   reports that she has never smoked. She has never used smokeless tobacco. She reports that she drinks alcohol. She  reports that she does not use drugs.    FAMILY HISTORY:  Family History   Problem Relation Age of Onset    Cancer Mother      Lung Cancer    Emphysema Mother     Heart attack Mother     Cancer Father      Lung Cancer    Osteoarthritis Father     Lung cancer Father     Skin cancer Father     Heart disease Brother     Heart attack Brother     Osteoarthritis Paternal Aunt     Retinal detachment Maternal Aunt     No Known Problems Sister     No Known Problems Maternal Uncle     No Known Problems Paternal Uncle     No Known Problems Maternal Grandmother     No Known Problems Maternal Grandfather     No Known Problems Paternal Grandmother     No Known Problems Paternal Grandfather     Colon cancer Neg Hx     Esophageal cancer Neg Hx     Stomach cancer Neg Hx     Celiac disease Neg Hx     Diabetes Neg Hx     Thyroid disease Neg Hx     Amblyopia Neg Hx     Blindness Neg Hx     Cataracts Neg Hx     Glaucoma Neg Hx     Hypertension Neg Hx     Macular degeneration Neg Hx     Strabismus Neg Hx     Stroke Neg Hx        CURRENT MEDICATIONS:   Current Outpatient Prescriptions   Medication Sig    albuterol 90 mcg/actuation inhaler Inhale 2 puffs into the lungs every 6 (six) hours as needed for Wheezing.    aspirin 81 mg Tab Take 81 mg by mouth every morning.     azelastine (ASTELIN) 137 mcg nasal spray 1 spray (137 mcg total) by Nasal route 2 (two) times daily. (Patient taking differently: 1 spray by Nasal route 2 (two) times daily as needed. )    benzonatate (TESSALON) 100 MG capsule Take 100 mg by mouth 3 (three) times daily.    budesonide-formoterol 160-4.5 mcg (SYMBICORT) 160-4.5 mcg/actuation HFAA Inhale 2 puffs into the lungs every 12 (twelve) hours.    calcium citrate-vitamin D (CITRACAL + D) 315-200 mg-unit per tablet Take 1 tablet by mouth 2 (two) times daily.     CYCLOSPORINE (RESTASIS OPHT) Inject 0.05 % into the eye. 1 Dropperette Both eyes Twice a day .  dispense one month  supply/sixty-four vials/ two trays    cyclosporine 0.05 % Drop Apply 1 drop to eye 2 (two) times daily. DISPENSE 3 MONTH SUPPLY    fish oil-omega-3 fatty acids 300-1,000 mg capsule Take 1,400 g by mouth once daily.    flaxseed oil 1,000 mg Cap Take by mouth once daily.    fluconazole (DIFLUCAN) 100 MG tablet TAKE 1 TABLET (100 MG TOTAL) BY MOUTH ONCE DAILY.    fluticasone (FLONASE) 50 mcg/actuation nasal spray     GUAIFENESIN (MUCINEX ORAL) Take 400 mg by mouth every 4 (four) hours as needed.     INV PROPULSID 10 MG Take 20 mg by mouth 4 (four) times daily as directed by physician.  For investigational use only    leflunomide (ARAVA) 20 MG Tab Take 1 tablet (20 mg total) by mouth once daily.    montelukast (SINGULAIR) 10 mg tablet Take 1 tablet (10 mg total) by mouth nightly.    naproxen (NAPROSYN) 500 MG tablet Take 1 tablet (500 mg total) by mouth 2 (two) times daily.    omeprazole (PRILOSEC) 40 MG capsule TAKE ONE CAPSULE BY MOUTH EVERY MORNING    omeprazole-sodium bicarbonate (ZEGERID) 40-1.1 mg-gram per capsule Take 1 capsule by mouth every morning.    POTASSIUM ORAL Take by mouth once daily.    predniSONE (DELTASONE) 1 MG tablet Take 1 mg by mouth once daily.    predniSONE (DELTASONE) 5 MG tablet Take 1 tablet (5 mg total) by mouth once daily.    PREMARIN 0.625 mg tablet Take 0.625 mg by mouth once daily.    progesterone (PROMETRIUM) 100 MG capsule Take 100 mg by mouth every morning. 1 Capsule Oral Every day, morning    RESTASIS 0.05 % ophthalmic emulsion     rizatriptan (MAXALT) 10 MG tablet Take 10 mg by mouth as needed for Migraine.     rosuvastatin (CRESTOR) 20 MG tablet TAKE 1 TABLET EVERY DAY    sucralfate (CARAFATE) 1 gram tablet Take 1 tablet (1 g total) by mouth 4 (four) times daily.    tramadol (ULTRAM) 50 mg tablet TAKE 1 TABLET DAILY AS NEEDED PAIN    VESICARE 10 mg tablet Take 1 tablet by mouth every morning.     XELJANZ XR 11 mg Tb24 TAKE ONE TABLET (11MG TOTAL) BY MOUTH  "EVERY DAY     No current facility-administered medications for this visit.      ALLERGIES:   Review of patient's allergies indicates:   Allergen Reactions    Actemra [tocilizumab] Other (See Comments)     Severe dizziness    Codeine Nausea And Vomiting    Gold au 198 Hives and Rash    Hydroxychloroquine Other (See Comments)     Can't remember the reaction      Iodinated contrast- oral and iv dye Other (See Comments)     Other reaction(s): BURNING ALL OVER    Iodine Other (See Comments)     Other reaction(s): BURNING ALL OVER - Iodine dye - Not topical    Sulfa (sulfonamide antibiotics) Other (See Comments)     Can't remember the reaction    Zofran [ondansetron hcl (pf)] Nausea And Vomiting     Pt reports that last time she received zofran she started vomiting again    Methotrexate analogues Nausea Only    Pneumovax 23 [pneumococcal 23-argenis ps vaccine] Other (See Comments)     "sick"             REVIEW OF SYSTEMS:   General ROS: negative  Psychological ROS: negative  Ophthalmic ROS: negative  ENT ROS: negative  Allergy and Immunology ROS: negative  Hematological and Lymphatic ROS: negative  Endocrine ROS: negative  Respiratory ROS: negative  Cardiovascular ROS: negative  Gastrointestinal ROS: see HPI  Genito-Urinary ROS: negative  Musculoskeletal ROS: see HPI  Neurological ROS: negative  Dermatological ROS: negative    PHYSICAL EXAM:   Vitals:    01/15/18 1426   BP: 122/70   Pulse: 102   Resp: 16   Temp: 98.2 °F (36.8 °C)       General - well developed, well nourished, no apparent distress  Head & Face - no sinus tenderness  Eyes - normal conjunctivae and lids   ENT - normal external auditory canals and tympanic membranes bilaterally oropharynx clear,  Normal dentition and gums  Neck - normal thyroid  Chest and Lung - normal respiratory effort, clear to auscultation bilaterally   Cardiovascular - RRR with no MGR, normal S1 and S2; no pedal edema  Abdomen -  soft, nontender, no palpable hepatomegaly or " splenomegaly  Lymph - no palpable lymphadenopathy  Extremities - unremarkable nails and digits  Heme - no bruising, petechiae, pallor  Skin - no rashes or lesions  Psych - appropriate mood and affect      ECOG Performance Status: (foot note - ECOG PS provided by [employer]) 1 - Symptomatic but completely ambulatory    Karnofsky Performance Score:  90%- Able to Carry on Normal Activity: Minor Symptoms of Disease  DATA:   Lab Results   Component Value Date    WBC 7.66 01/15/2018    WBC 7.66 01/15/2018    HGB 9.3 (L) 01/15/2018    HGB 9.3 (L) 01/15/2018    HCT 31.1 (L) 01/15/2018    HCT 31.1 (L) 01/15/2018    MCV 73 (L) 01/15/2018    MCV 73 (L) 01/15/2018     (H) 01/15/2018     (H) 01/15/2018     Gran #   Date Value Ref Range Status   01/15/2018 4.1 1.8 - 7.7 K/uL Final   01/15/2018 4.1 1.8 - 7.7 K/uL Final     Gran%   Date Value Ref Range Status   01/15/2018 53.5 38.0 - 73.0 % Final   01/15/2018 53.5 38.0 - 73.0 % Final     CMP  Sodium   Date Value Ref Range Status   01/15/2018 140 136 - 145 mmol/L Final     Potassium   Date Value Ref Range Status   01/15/2018 4.1 3.5 - 5.1 mmol/L Final     Chloride   Date Value Ref Range Status   01/15/2018 107 95 - 110 mmol/L Final     CO2   Date Value Ref Range Status   01/15/2018 26 23 - 29 mmol/L Final     Glucose   Date Value Ref Range Status   01/15/2018 116 (H) 70 - 110 mg/dL Final     BUN, Bld   Date Value Ref Range Status   01/15/2018 15 6 - 20 mg/dL Final     Creatinine   Date Value Ref Range Status   01/15/2018 0.9 0.5 - 1.4 mg/dL Final     Calcium   Date Value Ref Range Status   01/15/2018 9.1 8.7 - 10.5 mg/dL Final     Total Protein   Date Value Ref Range Status   01/15/2018 6.8 6.0 - 8.4 g/dL Final     Albumin   Date Value Ref Range Status   01/15/2018 3.6 3.5 - 5.2 g/dL Final     Total Bilirubin   Date Value Ref Range Status   01/15/2018 0.2 0.1 - 1.0 mg/dL Final     Comment:     For infants and newborns, interpretation of results  should be based  on gestational age, weight and in agreement with clinical  observations.  Premature Infant recommended reference ranges:  Up to 24 hours.............<8.0 mg/dL  Up to 48 hours............<12.0 mg/dL  3-5 days..................<15.0 mg/dL  6-29 days.................<15.0 mg/dL       Alkaline Phosphatase   Date Value Ref Range Status   01/15/2018 100 55 - 135 U/L Final     AST   Date Value Ref Range Status   01/15/2018 27 10 - 40 U/L Final     ALT   Date Value Ref Range Status   01/15/2018 26 10 - 44 U/L Final     Anion Gap   Date Value Ref Range Status   01/15/2018 7 (L) 8 - 16 mmol/L Final     eGFR if    Date Value Ref Range Status   01/15/2018 >60.0 >60 mL/min/1.73 m^2 Final     eGFR if non    Date Value Ref Range Status   01/15/2018 >60.0 >60 mL/min/1.73 m^2 Final     Comment:     Calculation used to obtain the estimated glomerular filtration  rate (eGFR) is the CKD-EPI equation.        Lab Results   Component Value Date    IRON 22 (L) 11/13/2017    TIBC 545 (H) 11/13/2017    FERRITIN 643 (H) 01/15/2018         ASSESSMENT AND PLAN:   Encounter Diagnosis   Name Primary?    Iron deficiency anemia, unspecified iron deficiency anemia type Yes        Microcytic anemia likely ROBERT  due to malabsorption related to GI issues ; possible anemia of chronic inflammation from RA and meds (xeljanz)  contributing  Denies any over GI bleeding; Recent summer 2017 upper and lower GI endoscopy negative; VCE deferred but may need if ROBERT persists  Intolerant to po iron   Proceed with 2nd of 2 doses of feraheme today  Iron studies and cbc today show persistent anemia but were checked too close to recent iron infusion; ferritin elevated but being treated for bacterial sinusitis right now  Will need lab  recheck in 6 weeks   Discussed small <1% risk of potentially serious and fatal allergic reactions with iv iron preparations and patient expressed understanding and wishes to proceed  If  anemia persists will perform more in depth anemia evaluation including possible bone marrow biopsy; low clinical index of suspicion for primary bone marrow process     Follow Up:     -cbc, ferritin and md appt in 6 weeks  -same labs and md appt in 5  months   Michael Woodson MD  Hematology/Oncology/Bone Marrow Transplant

## 2018-01-15 NOTE — PLAN OF CARE
Problem: Anemia (Adult)  Goal: Identify Related Risk Factors and Signs and Symptoms  Related risk factors and signs and symptoms are identified upon initiation of Human Response Clinical Practice Guideline (CPG)   Outcome: Ongoing (interventions implemented as appropriate)  Pt here for Fereheme infusion, labs, hx, meds, allergies reviewed. No complaints or concerns at this time, just feel tired, warm blanket provided, continue to monitor

## 2018-01-15 NOTE — PROGRESS NOTES
Ferritin up but has sinus infection on abx  Had infusion 1 on 1/10 due for second one today   Labs in 8 weeks if anemia still then marrow    zeljanz ? ACD?  Was booked too early   Still hasnt had VCE

## 2018-01-15 NOTE — PLAN OF CARE
Problem: Patient Care Overview  Goal: Plan of Care Review  Outcome: Ongoing (interventions implemented as appropriate)  Pt tolerated Fe infusion without issue, avs given, pt to rtc 1/22/18, no distress noted upon d/c to home

## 2018-01-16 ENCOUNTER — PATIENT MESSAGE (OUTPATIENT)
Dept: RHEUMATOLOGY | Facility: CLINIC | Age: 58
End: 2018-01-16

## 2018-01-17 ENCOUNTER — OFFICE VISIT (OUTPATIENT)
Dept: OPTOMETRY | Facility: CLINIC | Age: 58
End: 2018-01-17
Payer: MEDICARE

## 2018-01-17 DIAGNOSIS — H52.4 PRESBYOPIA OF BOTH EYES: ICD-10-CM

## 2018-01-17 DIAGNOSIS — H52.01 HYPERMETROPIA OF RIGHT EYE: ICD-10-CM

## 2018-01-17 DIAGNOSIS — Z46.0 ENCOUNTER FOR FITTING OR ADJUSTMENT OF SPECTACLES OR CONTACT LENSES: Primary | ICD-10-CM

## 2018-01-17 DIAGNOSIS — H52.02 HYPEROPIA OF LEFT EYE WITH ASTIGMATISM: ICD-10-CM

## 2018-01-17 DIAGNOSIS — H04.123 DRY EYES, BILATERAL: Primary | ICD-10-CM

## 2018-01-17 DIAGNOSIS — Z13.5 SCREENING FOR EYE CONDITION: ICD-10-CM

## 2018-01-17 DIAGNOSIS — H52.202 HYPEROPIA OF LEFT EYE WITH ASTIGMATISM: ICD-10-CM

## 2018-01-17 DIAGNOSIS — H53.001 AMBLYOPIA EX ANOPSIA OF RIGHT EYE: ICD-10-CM

## 2018-01-17 PROCEDURE — 99999 PR PBB SHADOW E&M-EST. PATIENT-LVL III: CPT | Mod: PBBFAC,,, | Performed by: OPTOMETRIST

## 2018-01-17 PROCEDURE — 99499 UNLISTED E&M SERVICE: CPT | Mod: S$GLB,,, | Performed by: OPTOMETRIST

## 2018-01-17 PROCEDURE — 92014 COMPRE OPH EXAM EST PT 1/>: CPT | Mod: S$GLB,,, | Performed by: OPTOMETRIST

## 2018-01-17 PROCEDURE — 99213 OFFICE O/P EST LOW 20 MIN: CPT | Mod: PBBFAC | Performed by: OPTOMETRIST

## 2018-01-17 PROCEDURE — 92015 DETERMINE REFRACTIVE STATE: CPT | Mod: S$GLB,,, | Performed by: OPTOMETRIST

## 2018-01-17 NOTE — PROGRESS NOTES
HPI     Concerns About Ocular Health    Additional comments: Eye exam and refraction, and contact lens follow-up.               Comments   Approximate date of last eye examination:  12/02/2015  Name of last eye doctor seen:  Dr Jurado  Wears glasses? yes     If yes, wears full-time or part-time?  Part time after removing CLs  Present glasses are bifocal / S V Distance / S V Reading:  progressives  Approximate age of present glasses:  Not sure  Got new glasses following last exam, or subsequently?:  no   Any problem with [facility] with glasses?  no  Wears CLs?:  yes           If yes:              Type of CL worn: 1 Day Acuvue Moist              Wears full-time or part-time:  Full time               Sleeps with contact lenses:  no               CL Solution used:  Opti Free                How often replace CLs:  As needed              Any problem with VA with CLs?  no              Has patient read and signed the contact lens fee information   document? yes  Headaches? no  Eye pain/discomfort?  no                                                                                       Flashes?  no  Floaters?  no  Diplopia/Double vision?  no  Patient's Ocular History:         Any eye surgeries?  no         Any eye injury?  no         Any treatment for eye disease?  Dry eyes  Family history of eye disease?  Aunt-retinal disorder  Significant patient medical history:         1. Diabetes?  no       If yes, IDDM or NIDDM?    2. HBP?  no              3. Other (describe):  Rheumatoid arthritis, asthma, 8   blockages, being watched   ! OTC eyedrops currently using:  Refresh,     ! Prescription eye meds currently using:  Restasis, bid-OU, Serum drops    4-6 times per day   ! Any history of allergy/adverse reaction to any eye meds used   previously?  no    ! Any history of allergy/adverse reaction to eyedrops used during prior   eye exam(s)? no    ! Any history of allergy/adverse reaction to Novacaine or similar meds?   no   ! Any  "history of allergy/adverse reaction to Epinephrine or similar meds?   no    ! Patient okay with use of anesthetic eyedrops to check eye pressure? yes            ! Patient okay with use of eyedrops to dilate pupils today? yes    !  Allergies/Medications/Medical History/Family History reviewed today?    yes      PD =   62/59  Desired reading distance =  17.25"                                                                      Last edited by Patrice Jurado, OD on 1/17/2018  2:30 PM. (History)            Assessment /Plan     For exam results, see Encounter Report.    1. Dry eyes, bilateral     2. Amblyopia ex anopsia of right eye     3. Screening for eye condition     4. Hypermetropia of right eye     5. Hyperopia of left eye with astigmatism     6. Presbyopia of both eyes                    Prior diagnosis of bilateral dry eye.  Using Restasis ophthalmic solution in both eyes two times per day.   Using autologus tears several times per day in both eyes.  Followed by Dr. Armas.  Continue Restasis, and continue autologus tears as noted above.  Otherwise, good ocular health in both eyes.  Hyperopia in the right eye, and hyperopia with astigmatism in the left eye.  Presbyopia consistent with age.  New spectacle lens Rx for use in lieu of CLs.    Good contact lens fit in each eye with present 1 Day Acuvue Moist SCLs.  Wearing CLs well in each eye.  Showing need or power change in the right CL only.   Power of the left lens okay as is.  New CL Rx issued, but okay to continue with present supply of right lenses until out, as right eye is mildly amblyopic.    Recheck in one year - or prior if any problems noted in the interim.    "

## 2018-01-17 NOTE — PATIENT INSTRUCTIONS
Prior diagnosis of bilateral dry eye.  Using Restasis ophthalmic solution in both eyes two times per day.   Using autologus tears several times per day in both eyes.  Followed by Dr. Armas.  Continue Restasis, and continue autologus tears as noted above.  Otherwise, good ocular health in both eyes.  Hyperopia in the right eye, and hyperopia with astigmatism in the left eye.  Presbyopia consistent with age.  New spectacle lens Rx for use in lieu of CLs.    Good contact lens fit in each eye with present 1 Day Acuvue Moist SCLs.  Wearing CLs well in each eye.  Showing need or power change in the right CL only.   Power of the left lens okay as is.  New CL Rx issued, but okay to continue with present supply of right lenses until out, as right eye is mildly amblyopic.    Recheck in one year - or prior if any problems noted in the interim.

## 2018-01-17 NOTE — PROGRESS NOTES
HPI     Patient in today for contact lens follow-up with general eye examination.    Refer to additional patient encounter notes dated 01/17/2018      Last edited by Patrice Jurado, OD on 1/17/2018  1:56 PM. (History)            Assessment /Plan     For exam results, see Encounter Report.    1. Encounter for fitting or adjustment of spectacles or contact lenses                      Prior diagnosis of bilateral dry eye.  Using Restasis ophthalmic solution in both eyes two times per day.   Using autologus tears several times per day in both eyes.  Followed by Dr. Armas.  Continue Restasis, and continue autologus tears as noted above.  Otherwise, good ocular health in both eyes.  Hyperopia in the right eye, and hyperopia with astigmatism in the left eye.  Presbyopia consistent with age.  New spectacle lens Rx for use in lieu of CLs.    Good contact lens fit in each eye with present 1 Day Acuvue Moist SCLs.  Wearing CLs well in each eye.  Showing need or power change in the right CL only.   Power of the left lens okay as is.  New CL Rx issued, but okay to continue with present supply of right lenses until out, as right eye is mildly amblyopic.    Recheck in one year - or prior if any problems noted in the interim.

## 2018-01-18 DIAGNOSIS — M06.9 RHEUMATOID ARTHRITIS, INVOLVING UNSPECIFIED SITE, UNSPECIFIED RHEUMATOID FACTOR PRESENCE: ICD-10-CM

## 2018-01-18 RX ORDER — TOFACITINIB 11 MG/1
TABLET, FILM COATED, EXTENDED RELEASE ORAL
Qty: 90 TABLET | Refills: 0 | Status: SHIPPED | OUTPATIENT
Start: 2018-01-18 | End: 2018-01-23

## 2018-01-23 ENCOUNTER — TELEPHONE (OUTPATIENT)
Dept: PHARMACY | Facility: CLINIC | Age: 58
End: 2018-01-23

## 2018-01-24 DIAGNOSIS — B00.2 RECURRENT ORAL HERPES SIMPLEX: ICD-10-CM

## 2018-01-24 DIAGNOSIS — M06.9 RHEUMATOID ARTHRITIS, INVOLVING UNSPECIFIED SITE, UNSPECIFIED RHEUMATOID FACTOR PRESENCE: ICD-10-CM

## 2018-01-24 RX ORDER — VALACYCLOVIR HYDROCHLORIDE 1 G/1
TABLET, FILM COATED ORAL
Qty: 20 TABLET | Refills: 3 | Status: SHIPPED | OUTPATIENT
Start: 2018-01-24 | End: 2020-08-13

## 2018-01-24 RX ORDER — LEFLUNOMIDE 20 MG/1
20 TABLET ORAL DAILY
Qty: 90 TABLET | Refills: 0 | Status: SHIPPED | OUTPATIENT
Start: 2018-01-24 | End: 2018-04-25 | Stop reason: SDUPTHER

## 2018-01-24 NOTE — TELEPHONE ENCOUNTER
Good afternoon,    She is also going to be in need of her leflunomide refill soon and would preffer to get that filled at just her local pharmacy. If you could please forward that prescription to the [facility] on file in [place].     She was informed of this change and was given the number to [facility] specialty pharmacy to get scheduled for her Xeljanz refill.     Thanks,  Leslee Nava, PharmD  [facility]- Clinical Pharmacist  [phone]

## 2018-01-25 ENCOUNTER — TELEPHONE (OUTPATIENT)
Dept: RHEUMATOLOGY | Facility: CLINIC | Age: 58
End: 2018-01-25

## 2018-01-25 ENCOUNTER — PATIENT MESSAGE (OUTPATIENT)
Dept: RHEUMATOLOGY | Facility: CLINIC | Age: 58
End: 2018-01-25

## 2018-01-29 ENCOUNTER — HOSPITAL ENCOUNTER (OUTPATIENT)
Dept: RADIOLOGY | Facility: HOSPITAL | Age: 58
Discharge: HOME | End: 2018-01-29
Attending: HOSPITALIST
Payer: MEDICARE

## 2018-01-29 ENCOUNTER — CLINICAL SUPPORT (OUTPATIENT)
Dept: RHEUMATOLOGY | Facility: CLINIC | Age: 58
End: 2018-01-29
Payer: MEDICARE

## 2018-01-29 VITALS
BODY MASS INDEX: 25.8 KG/M2 | DIASTOLIC BLOOD PRESSURE: 76 MMHG | HEIGHT: 64 IN | WEIGHT: 151.13 LBS | SYSTOLIC BLOOD PRESSURE: 109 MMHG | HEART RATE: 94 BPM

## 2018-01-29 DIAGNOSIS — G89.29 CHRONIC RIGHT SHOULDER PAIN: Primary | ICD-10-CM

## 2018-01-29 DIAGNOSIS — M75.81 TENDINITIS OF RIGHT ROTATOR CUFF: ICD-10-CM

## 2018-01-29 DIAGNOSIS — M25.511 CHRONIC RIGHT SHOULDER PAIN: Primary | ICD-10-CM

## 2018-01-29 DIAGNOSIS — G89.29 CHRONIC RIGHT SHOULDER PAIN: ICD-10-CM

## 2018-01-29 DIAGNOSIS — M25.511 CHRONIC RIGHT SHOULDER PAIN: ICD-10-CM

## 2018-01-29 PROCEDURE — 73030 X-RAY EXAM OF SHOULDER: CPT | Mod: 26,RT,, | Performed by: RADIOLOGY

## 2018-01-29 PROCEDURE — 20610 DRAIN/INJ JOINT/BURSA W/O US: CPT | Mod: RT,S$GLB,, | Performed by: INTERNAL MEDICINE

## 2018-01-29 PROCEDURE — 73030 X-RAY EXAM OF SHOULDER: CPT | Mod: TC,RT

## 2018-01-29 PROCEDURE — 99999 PR PBB SHADOW E&M-EST. PATIENT-LVL IV: CPT | Mod: PBBFAC,,, | Performed by: INTERNAL MEDICINE

## 2018-01-29 RX ORDER — TRIAMCINOLONE ACETONIDE 40 MG/ML
40 INJECTION, SUSPENSION INTRA-ARTICULAR; INTRAMUSCULAR
Status: DISCONTINUED | OUTPATIENT
Start: 2018-01-29 | End: 2018-01-29 | Stop reason: HOSPADM

## 2018-01-29 RX ADMIN — TRIAMCINOLONE ACETONIDE 40 MG: 40 INJECTION, SUSPENSION INTRA-ARTICULAR; INTRAMUSCULAR at 09:01

## 2018-01-29 NOTE — PROCEDURES
Large Joint Aspiration/Injection  Date/Time: 1/29/2018 9:18 AM  Performed by: KADIE MONET  Authorized by: KADIE MONET     Consent Done?:  Yes (Verbal)  Indications:  Pain  Procedure site marked: Yes    Timeout: Prior to procedure the correct patient, procedure, and site was verified      Location:  Shoulder  Site:  R subacromial bursa  Prep: Patient was prepped and draped in usual sterile fashion    Ultrasonic Guidance for needle placement: No  Needle size:  25 G  Medications:  40 mg triamcinolone acetonide 40 mg/mL  Aspirate amount (ml):  0  Patient tolerance:  Patient tolerated the procedure well with no immediate complications

## 2018-02-01 ENCOUNTER — HOSPITAL ENCOUNTER (OUTPATIENT)
Dept: PULMONOLOGY | Facility: CLINIC | Age: 58
Discharge: HOME | End: 2018-02-01
Payer: MEDICARE

## 2018-02-01 ENCOUNTER — OFFICE VISIT (OUTPATIENT)
Dept: PULMONOLOGY | Facility: CLINIC | Age: 58
End: 2018-02-01
Payer: MEDICARE

## 2018-02-01 VITALS
BODY MASS INDEX: 27.38 KG/M2 | DIASTOLIC BLOOD PRESSURE: 78 MMHG | SYSTOLIC BLOOD PRESSURE: 116 MMHG | HEIGHT: 61 IN | HEART RATE: 79 BPM | OXYGEN SATURATION: 95 % | WEIGHT: 145 LBS

## 2018-02-01 DIAGNOSIS — J30.1 CHRONIC ALLERGIC RHINITIS DUE TO POLLEN, UNSPECIFIED SEASONALITY: ICD-10-CM

## 2018-02-01 DIAGNOSIS — J45.909 UNCOMPLICATED ASTHMA: ICD-10-CM

## 2018-02-01 DIAGNOSIS — J45.20 MILD INTERMITTENT ASTHMA WITHOUT COMPLICATION: Primary | ICD-10-CM

## 2018-02-01 LAB
PRE FEV1 FVC: 80
PRE FEV1: 2.69
PRE FVC: 3.35
PREDICTED FEV1 FVC: 82
PREDICTED FEV1: 2.24
PREDICTED FVC: 2.75

## 2018-02-01 PROCEDURE — 94010 BREATHING CAPACITY TEST: CPT | Mod: S$GLB,,, | Performed by: INTERNAL MEDICINE

## 2018-02-01 PROCEDURE — 94729 DIFFUSING CAPACITY: CPT | Mod: S$GLB,,, | Performed by: INTERNAL MEDICINE

## 2018-02-01 PROCEDURE — 99999 PR PBB SHADOW E&M-EST. PATIENT-LVL III: CPT | Mod: PBBFAC,,, | Performed by: INTERNAL MEDICINE

## 2018-02-01 PROCEDURE — 99213 OFFICE O/P EST LOW 20 MIN: CPT | Mod: 25,S$GLB,, | Performed by: INTERNAL MEDICINE

## 2018-02-01 PROCEDURE — 3008F BODY MASS INDEX DOCD: CPT | Mod: S$GLB,,, | Performed by: INTERNAL MEDICINE

## 2018-02-01 RX ORDER — FLUCONAZOLE 100 MG/1
100 TABLET ORAL DAILY
Refills: 3 | COMMUNITY
Start: 2018-01-11 | End: 2018-07-09

## 2018-02-01 NOTE — PROGRESS NOTES
"Subjective:       Patient ID: Joyce Peterson is a 57 y.o. female.    Chief Complaint: Asthma    57 year old with a history of RA, chronic immunosuppression and asthma.  Currently on symbicort 160 BID.  Mild cough productive of thick phlegm.  No blood.  No real chest congestion.  Has mild chest tightness but has not used albuterol.  Throat clearing.  Allovert, saline spray and flonase.  No fevers, chills or sweats.  UTD on immunizations.      Asthma   She complains of chest tightness, cough and frequent throat clearing. There is no difficulty breathing, shortness of breath or wheezing. Associated symptoms include rhinorrhea. Pertinent negatives include no heartburn. Her past medical history is significant for asthma.     Review of Systems   HENT: Positive for rhinorrhea.    Respiratory: Positive for cough. Negative for shortness of breath and wheezing.    Gastrointestinal: Negative for heartburn.         ACT of 24  Objective:       Vitals:    02/01/18 1537   BP: 116/78   BP Location: Left arm   Patient Position: Sitting   Pulse: 79   SpO2: 95%   Weight: 65.8 kg (145 lb)   Height: 5' 1" (1.549 m)     Physical Exam   Constitutional: She is oriented to person, place, and time. She appears well-developed and well-nourished.   Neck: Normal range of motion. Neck supple.   Cardiovascular: Normal rate and regular rhythm.    Pulmonary/Chest: Normal expansion. She has no wheezes. She has no rales.   Musculoskeletal: Normal range of motion.   Lymphadenopathy: No supraclavicular adenopathy is present.     She has no cervical adenopathy.   Neurological: She is alert and oriented to person, place, and time.   Skin: Skin is warm and dry.   Psychiatric: She has a normal mood and affect.        Personal Diagnostic Review:    Pulmonary function tests: Normal spirometry, normal diffusion.    Pulmonary Studies Review 2/1/2018   FVC 3.35   FVC% 123   FEV1 2.69   FEV1% 120   FEV1/FVC 80.3   FEF 25-75 2.83   FEF 25-75% 118   DLCO " (ml/mmHg sec) 21.2   DLCO% 115   SpO2 95   FiO2 (%) 21   Height 61.000   Weight 2320   BMI (Calculated) 27.5   Predicted Distance 374.09   Predicted Distance Meters (Calculated) 513.76     No flowsheet data found.      Assessment:       1. Mild intermittent asthma without complication    2. Chronic allergic rhinitis due to pollen, unspecified seasonality        Outpatient Encounter Prescriptions as of 2/1/2018   Medication Sig Dispense Refill    albuterol 90 mcg/actuation inhaler Inhale 2 puffs into the lungs every 6 (six) hours as needed for Wheezing. 18 g 11    aspirin 81 mg Tab Take 81 mg by mouth every morning.       azelastine (ASTELIN) 137 mcg nasal spray 1 spray (137 mcg total) by Nasal route 2 (two) times daily. (Patient taking differently: 1 spray by Nasal route 2 (two) times daily as needed. ) 30 mL 11    benzonatate (TESSALON) 100 MG capsule Take 100 mg by mouth 3 (three) times daily.  0    budesonide-formoterol 160-4.5 mcg (SYMBICORT) 160-4.5 mcg/actuation HFAA Inhale 2 puffs into the lungs every 12 (twelve) hours. 3 Inhaler 3    calcium citrate-vitamin D (CITRACAL + D) 315-200 mg-unit per tablet Take 1 tablet by mouth 2 (two) times daily.       CYCLOSPORINE (RESTASIS OPHT) Inject 0.05 % into the eye. 1 Dropperette Both eyes Twice a day .  dispense one month supply/sixty-four vials/ two trays      cyclosporine 0.05 % Drop Apply 1 drop to eye 2 (two) times daily. DISPENSE 3 MONTH SUPPLY 5.5 mL 11    fish oil-omega-3 fatty acids 300-1,000 mg capsule Take 1,400 g by mouth once daily.      flaxseed oil 1,000 mg Cap Take by mouth once daily.      fluconazole (DIFLUCAN) 100 MG tablet Take 100 mg by mouth once daily.  3    fluticasone (FLONASE) 50 mcg/actuation nasal spray       GUAIFENESIN (MUCINEX ORAL) Take 400 mg by mouth every 4 (four) hours as needed.       INV PROPULSID 10 MG Take 20 mg by mouth 4 (four) times daily as directed by physician.  For investigational use only 480 each 2     leflunomide (ARAVA) 20 MG Tab Take 1 tablet (20 mg total) by mouth once daily. 90 tablet 0    montelukast (SINGULAIR) 10 mg tablet Take 1 tablet (10 mg total) by mouth nightly. 90 tablet 3    naproxen (NAPROSYN) 500 MG tablet Take 1 tablet (500 mg total) by mouth 2 (two) times daily. 180 tablet 1    omeprazole (PRILOSEC) 40 MG capsule TAKE ONE CAPSULE BY MOUTH EVERY MORNING 30 capsule 6    omeprazole-sodium bicarbonate (ZEGERID) 40-1.1 mg-gram per capsule Take 1 capsule by mouth every morning. 90 capsule 3    POTASSIUM ORAL Take by mouth once daily.      predniSONE (DELTASONE) 1 MG tablet Take 1 mg by mouth once daily.      predniSONE (DELTASONE) 5 MG tablet Take 1 tablet (5 mg total) by mouth once daily. 90 tablet 1    PREMARIN 0.625 mg tablet Take 0.625 mg by mouth once daily.  11    progesterone (PROMETRIUM) 100 MG capsule Take 100 mg by mouth every morning. 1 Capsule Oral Every day, morning      RESTASIS 0.05 % ophthalmic emulsion       rizatriptan (MAXALT) 10 MG tablet Take 10 mg by mouth as needed for Migraine.       rosuvastatin (CRESTOR) 20 MG tablet TAKE 1 TABLET EVERY DAY 90 tablet 3    sucralfate (CARAFATE) 1 gram tablet Take 1 tablet (1 g total) by mouth 4 (four) times daily. 360 tablet 3    tofacitinib (XELJANZ XR) 11 mg Tb24 Take 11 mg by mouth once daily. 90 tablet 0    tramadol (ULTRAM) 50 mg tablet TAKE 1 TABLET DAILY AS NEEDED PAIN 30 tablet 0    valACYclovir (VALTREX) 1000 MG tablet TAKE 1 TABLET BY MOUTH TWICE A DAY AS NEEDED 20 tablet 3    VESICARE 10 mg tablet Take 1 tablet by mouth every morning.        No facility-administered encounter medications on file as of 2/1/2018.      No orders of the defined types were placed in this encounter.    Plan:       Doing great, from an asthma standpoint.  Stable.  Follow up in 6 months.  Continue symbicort for control and albuterol for rescue and prevention  Sinus regimen.

## 2018-02-06 ENCOUNTER — OFFICE VISIT (OUTPATIENT)
Dept: RHEUMATOLOGY | Facility: CLINIC | Age: 58
End: 2018-02-06
Payer: MEDICARE

## 2018-02-06 VITALS
HEART RATE: 77 BPM | HEIGHT: 64 IN | WEIGHT: 150 LBS | DIASTOLIC BLOOD PRESSURE: 81 MMHG | BODY MASS INDEX: 25.61 KG/M2 | SYSTOLIC BLOOD PRESSURE: 120 MMHG

## 2018-02-06 DIAGNOSIS — M15.3 OTHER SECONDARY OSTEOARTHRITIS OF MULTIPLE SITES: ICD-10-CM

## 2018-02-06 DIAGNOSIS — M85.80 OSTEOPENIA, UNSPECIFIED LOCATION: ICD-10-CM

## 2018-02-06 DIAGNOSIS — G89.29 CHRONIC RIGHT SHOULDER PAIN: ICD-10-CM

## 2018-02-06 DIAGNOSIS — M06.9 RHEUMATOID ARTHRITIS INVOLVING MULTIPLE SITES, UNSPECIFIED RHEUMATOID FACTOR PRESENCE: ICD-10-CM

## 2018-02-06 DIAGNOSIS — M70.60 TROCHANTERIC BURSITIS, UNSPECIFIED LATERALITY: ICD-10-CM

## 2018-02-06 DIAGNOSIS — M75.81 ROTATOR CUFF TENDINITIS, RIGHT: Primary | ICD-10-CM

## 2018-02-06 DIAGNOSIS — M25.511 CHRONIC RIGHT SHOULDER PAIN: ICD-10-CM

## 2018-02-06 DIAGNOSIS — I25.10 CORONARY ARTERY DISEASE INVOLVING NATIVE CORONARY ARTERY OF NATIVE HEART WITHOUT ANGINA PECTORIS: Chronic | ICD-10-CM

## 2018-02-06 PROBLEM — M25.60 RANGE OF MOTION DEFICIT: Status: RESOLVED | Noted: 2017-06-30 | Resolved: 2018-02-06

## 2018-02-06 PROBLEM — D50.0 IRON DEFICIENCY ANEMIA DUE TO CHRONIC BLOOD LOSS: Status: RESOLVED | Noted: 2017-06-29 | Resolved: 2018-02-06

## 2018-02-06 PROCEDURE — 99214 OFFICE O/P EST MOD 30 MIN: CPT | Mod: S$GLB,,, | Performed by: INTERNAL MEDICINE

## 2018-02-06 PROCEDURE — 99999 PR PBB SHADOW E&M-EST. PATIENT-LVL V: CPT | Mod: PBBFAC,,, | Performed by: INTERNAL MEDICINE

## 2018-02-06 PROCEDURE — 3008F BODY MASS INDEX DOCD: CPT | Mod: S$GLB,,, | Performed by: INTERNAL MEDICINE

## 2018-02-06 RX ORDER — METHOTREXATE 2.5 MG/1
7.5 TABLET ORAL
Qty: 12 TABLET | Refills: 2 | Status: SHIPPED | OUTPATIENT
Start: 2018-02-06 | End: 2018-03-12 | Stop reason: SINTOL

## 2018-02-06 RX ORDER — FOLIC ACID 1 MG/1
1 TABLET ORAL DAILY
Qty: 90 TABLET | Refills: 1 | Status: SHIPPED | OUTPATIENT
Start: 2018-02-06 | End: 2018-08-08 | Stop reason: SDUPTHER

## 2018-02-06 ASSESSMENT — ROUTINE ASSESSMENT OF PATIENT INDEX DATA (RAPID3)
AM STIFFNESS SCORE: 1, YES
PAIN SCORE: 7.5
PATIENT GLOBAL ASSESSMENT SCORE: 5
FATIGUE SCORE: 7.5
TOTAL RAPID3 SCORE: 6.05
MDHAQ FUNCTION SCORE: 1.7
PSYCHOLOGICAL DISTRESS SCORE: 2.2

## 2018-02-06 ASSESSMENT — DISEASE ACTIVITY SCORE (DAS28)
ESR_MM_PER_HR: 5
CRP_MG_PER_LITER: .3
SWOLLEN_JOINTS_COUNT: 2
ESR_MM_PER_HR: 5
GLOBAL_HEALTH_SCORE: 50
TENDER_JOINTS_COUNT: 24
GLOBAL_HEALTH_SCORE: 50
CRP_MG_PER_LITER: .3
TOTAL_SCORE_ESR: 4.97
TOTAL_SCORE_CRP: 4.89

## 2018-02-06 NOTE — PROGRESS NOTES
"Subjective:       Patient ID: Joyce Peterson is a 57 y.o. female.    Chief Complaint: No chief complaint on file.    HPI     56 yo F with RA, osteopenia here to follow up    She takes leflunomide 20 mg daily, xeljanz 11 mg daily, and naproxen 500 mg bid.  Still has some joint pains on this regimen.    Last got a shot in the R shoulder.  She is experiencing tenderness in both hands, hips, shoulders.  Her R knee is swollen again today.    She has been keeping up with her exercise.  She received two iron infusions in January. See's Dr. Hung for iron deficiency.  She has a Prolia scheduled for March 1.  Last bone density scan was done 2/16 which revealed osteopenia.    DAS28 4.5 today    Review of Systems   Constitutional: Negative for fever and unexpected weight change.   HENT: Negative for mouth sores and trouble swallowing.    Eyes: Negative for redness.   Respiratory: Negative for cough and shortness of breath.    Cardiovascular: Negative for chest pain.   Gastrointestinal: Negative for constipation and diarrhea.   Genitourinary: Negative for dysuria and genital sores.   Musculoskeletal: Positive for arthralgias and joint swelling. Negative for back pain, gait problem, myalgias, neck pain and neck stiffness.   Skin: Negative for color change and rash.   Neurological: Negative for numbness and headaches.   Hematological: Negative for adenopathy. Does not bruise/bleed easily.         Objective:   /81   Pulse 77   Ht 5' 3.6" (1.615 m)   Wt 68 kg (150 lb)   LMP  (LMP Unknown)   BMI 26.07 kg/m²      Physical Exam   Constitutional: She is oriented to person, place, and time and well-developed, well-nourished, and in no distress.   HENT:   Head: Normocephalic and atraumatic.   Eyes: EOM are normal. Pupils are equal, round, and reactive to light.   Neck: Neck supple.   Cardiovascular: Normal rate and regular rhythm.    Pulmonary/Chest: Effort normal and breath sounds normal.   Abdominal: Soft. "       Right Side Rheumatological Exam     Examination finds the shoulder, elbow, knee, 2nd PIP and 3rd PIP normal.    The patient is tender to palpation of the wrist, 1st MCP, 2nd MCP, 3rd MCP, 4th MCP and 5th MCP    The patient has an enlarged 1st PIP, 2nd MCP, 3rd MCP, 4th PIP and 5th PIP    Knee Exam     Range of Motion   Extension: normal   Flexion: normal     Left Side Rheumatological Exam     Examination finds the shoulder, elbow, knee, 1st PIP, 1st MCP and 2nd PIP normal.    The patient is tender to palpation of the wrist, 2nd MCP, 3rd MCP, 4th MCP and 5th MCP.    The patient has an enlarged 2nd MCP, 3rd PIP, 3rd MCP, 4th PIP and 5th PIP.      Neurological: She is alert and oriented to person, place, and time.   Skin: Skin is warm and dry.     Psychiatric: Affect normal.         Assessment:       1. Rotator cuff tendinitis, right    2. Trochanteric bursitis, unspecified laterality            Plan:         Resume MTX 7.5 mg weekly with folic acid daily  Referral to PT / OT  Standing labs in 4 weeks and 12 weeks

## 2018-02-06 NOTE — PROGRESS NOTES
"I have personally taken the history and examined the patient and agree with the resident,s note as stated above. She feels joints are "stable" Feels tofacitinib is keeping RA under control but not 100%. Major symptoms right>left shoulder(the right shoulder injection helped partially), both hips, and knees. No new joint swelling.  Asthma controlled     Exam: trace bulge signs both knees. Tender 2-5 pips, 1-5 mcps, wrists, shoulders and knees bilateral.          Results for TONNY GOMEZ (MRN [id number]) as of 2/6/2018 11:39   Ref. Range 1/15/2018 13:12 1/15/2018 13:12 1/29/2018 09:41   WBC Latest Ref Range: 3.90 - 12.70 K/uL 7.66 7.66    RBC Latest Ref Range: 4.00 - 5.40 M/uL 4.25 4.25    Hemoglobin Latest Ref Range: 12.0 - 16.0 g/dL 9.3 (L) 9.3 (L)    Hematocrit Latest Ref Range: 37.0 - 48.5 % 31.1 (L) 31.1 (L)    MCV Latest Ref Range: 82 - 98 fL 73 (L) 73 (L)    MCH Latest Ref Range: 27.0 - 31.0 pg 21.9 (L) 21.9 (L)    MCHC Latest Ref Range: 32.0 - 36.0 g/dL 29.9 (L) 29.9 (L)    RDW Latest Ref Range: 11.5 - 14.5 % 23.1 (H) 23.1 (H)    Platelets Latest Ref Range: 150 - 350 K/uL 462 (H) 462 (H)    MPV Latest Ref Range: 9.2 - 12.9 fL 9.7 9.7    Gran% Latest Ref Range: 38.0 - 73.0 % 53.5 53.5    Gran # (ANC) Latest Ref Range: 1.8 - 7.7 K/uL 4.1 4.1    Immature Granulocytes Latest Ref Range: 0.0 - 0.5 % 0.5 0.5    Immature Grans (Abs) Latest Ref Range: 0.00 - 0.04 K/uL 0.04 0.04    Lymph% Latest Ref Range: 18.0 - 48.0 % 32.1 32.1    Lymph # Latest Ref Range: 1.0 - 4.8 K/uL 2.5 2.5    Mono% Latest Ref Range: 4.0 - 15.0 % 12.8 12.8    Mono # Latest Ref Range: 0.3 - 1.0 K/uL 1.0 1.0    Eosinophil% Latest Ref Range: 0.0 - 8.0 % 0.3 0.3    Eos # Latest Ref Range: 0.0 - 0.5 K/uL 0.0 0.0    Basophil% Latest Ref Range: 0.0 - 1.9 % 0.8 0.8    Baso # Latest Ref Range: 0.00 - 0.20 K/uL 0.06 0.06    nRBC Latest Ref Range: 0 /100 WBC 0 0    Ferritin Latest Ref Range: 20.0 - 300.0 ng/mL 643 (H)     Sed Rate Latest Ref Range: 0 - 20 " mm/Hr 5     Sodium Latest Ref Range: 136 - 145 mmol/L 140     Potassium Latest Ref Range: 3.5 - 5.1 mmol/L 4.1     Chloride Latest Ref Range: 95 - 110 mmol/L 107     CO2 Latest Ref Range: 23 - 29 mmol/L 26     Anion Gap Latest Ref Range: 8 - 16 mmol/L 7 (L)     BUN, Bld Latest Ref Range: 6 - 20 mg/dL 15     Creatinine Latest Ref Range: 0.5 - 1.4 mg/dL 0.9     eGFR if non African American Latest Ref Range: >60 mL/min/1.73 m^2 >60.0     eGFR if African American Latest Ref Range: >60 mL/min/1.73 m^2 >60.0     Glucose Latest Ref Range: 70 - 110 mg/dL 116 (H)     Calcium Latest Ref Range: 8.7 - 10.5 mg/dL 9.1     Alkaline Phosphatase Latest Ref Range: 55 - 135 U/L 100     Total Protein Latest Ref Range: 6.0 - 8.4 g/dL 6.8     Albumin Latest Ref Range: 3.5 - 5.2 g/dL 3.6     Total Bilirubin Latest Ref Range: 0.1 - 1.0 mg/dL 0.2     AST Latest Ref Range: 10 - 40 U/L 27     ALT Latest Ref Range: 10 - 44 U/L 26     CRP Latest Ref Range: 0.0 - 8.2 mg/L 0.3     XR SHOULDER COMPLETE 2 OR MORE VIEWS RIGHT Unknown   Rpt   Differential Method Unknown Automated Automated                              Right>left shoulder pain with rotator cuff tendinitis, OA ACJ, impingement  erosive RA discontinued long term methotrexate b/o nausea with po and sc and reduced dose, tolerating leflunomide and tofacitinib.Has failed 3 TNFi(infliximab, etanercept, adalimumab), abatacept, hypogamma with rituximab, acute vertigo/dizziness with tocilizumab x2  Secondary Sjogren's now with punctal plugs and Restasis, seeing Jamila Armas  RA : ESR 17 CRP DAS28: 4.97 (moderate)  TJC=24 SJC=2  ESR 5     CRP 0.3    [id number] 4.89(moderate)  despite prednisone 5 mg daily. She had been doing better before tapering off methotrexate which increases efficacy of tofacitinib.  coronary ASCVD  dilated esophagus, dysmotility, candidiasis gastroparesis and iron deficiency anemia   osteopenia < NOF FRAX therapy threshold(moderate risk) but on prednisone 5mg daily due for  denosumab 60mg sc 6 mo(#2)   and only a candidate for Rx b/o prednisone. FRAX MO 12% Hip 0.9%(2/16/17 Also is taking estrogen-progesterone which is protective.    Resume methotrexate @ low dose, 7.5mg po once a week with folic acid 1mg daily  Standing labs in 4 wks and 12 wks. Will try adding back methotrexate to tofacitinib and adjusting dose as tolerated prior to trying 4th TNFi(certolizumab)   cont prednisone  5mg daily   Cont tofacitinib-XR 11 mg daily   Cont leflunomide 20mg daily  Ref to PT/OT, [place]  RTC 3 months with standing labs. If not improved, stop tofacitinib and change to certolizumab(4th anti-TNF)

## 2018-02-08 DIAGNOSIS — K31.84 GASTROPARESIS: Primary | ICD-10-CM

## 2018-02-14 ENCOUNTER — PATIENT MESSAGE (OUTPATIENT)
Dept: INTERNAL MEDICINE | Facility: CLINIC | Age: 58
End: 2018-02-14

## 2018-02-15 ENCOUNTER — OFFICE VISIT (OUTPATIENT)
Dept: PODIATRY | Facility: CLINIC | Age: 58
End: 2018-02-15
Payer: MEDICARE

## 2018-02-15 ENCOUNTER — HOSPITAL ENCOUNTER (OUTPATIENT)
Dept: RADIOLOGY | Facility: HOSPITAL | Age: 58
Discharge: HOME | End: 2018-02-15
Attending: PODIATRIST
Payer: MEDICARE

## 2018-02-15 VITALS
HEART RATE: 70 BPM | HEIGHT: 63 IN | SYSTOLIC BLOOD PRESSURE: 120 MMHG | WEIGHT: 150 LBS | DIASTOLIC BLOOD PRESSURE: 81 MMHG | BODY MASS INDEX: 26.58 KG/M2

## 2018-02-15 DIAGNOSIS — M79.671 RIGHT FOOT PAIN: ICD-10-CM

## 2018-02-15 DIAGNOSIS — M84.474S METATARSAL FRACTURE, PATHOLOGIC, RIGHT, SEQUELA: ICD-10-CM

## 2018-02-15 DIAGNOSIS — M84.474S METATARSAL FRACTURE, PATHOLOGIC, RIGHT, SEQUELA: Primary | ICD-10-CM

## 2018-02-15 PROCEDURE — 99213 OFFICE O/P EST LOW 20 MIN: CPT | Mod: S$GLB,,, | Performed by: PODIATRIST

## 2018-02-15 PROCEDURE — 73630 X-RAY EXAM OF FOOT: CPT | Mod: TC,PO,RT

## 2018-02-15 PROCEDURE — 73630 X-RAY EXAM OF FOOT: CPT | Mod: 26,RT,, | Performed by: RADIOLOGY

## 2018-02-15 PROCEDURE — 3008F BODY MASS INDEX DOCD: CPT | Mod: S$GLB,,, | Performed by: PODIATRIST

## 2018-02-15 PROCEDURE — 99999 PR PBB SHADOW E&M-EST. PATIENT-LVL III: CPT | Mod: PBBFAC,,, | Performed by: PODIATRIST

## 2018-02-15 NOTE — PROGRESS NOTES
Chief Complaint   Patient presents with    Foot Pain     right foot           HPI:       Joyce Peterson is a 57 y.o. female who presents to clinic complaining of right foot pain  She has history of Rheumatoid Arthritis and osteoporosis.   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     She is here for follow up today.   She would like a follow up xray of her right foot due to pain on the outside of her foot x 3-4 days.  No trauma, pain worse with weight bearing and walking.  She is wearing athletic shoes.  No new changes.              Past Medical History:   Diagnosis Date    Acid reflux     Allergy     Anemia     Asthma     Coronary artery disease     Degenerative disc disease     Dry eyes     Dry mouth     Gastroparesis     Hyperlipidemia     Lateral meniscus derangement 4/6/2016    Osteoarthritis     Osteoporosis     Rheumatoid arthritis(714.0)     Umbilical hernia 8/13/2015             Current Outpatient Prescriptions on File Prior to Visit   Medication Sig Dispense Refill    albuterol 90 mcg/actuation inhaler Inhale 2 puffs into the lungs every 6 (six) hours as needed for Wheezing. 18 g 11    aspirin 81 mg Tab Take 81 mg by mouth every morning.       azelastine (ASTELIN) 137 mcg nasal spray 1 spray (137 mcg total) by Nasal route 2 (two) times daily. (Patient taking differently: 1 spray by Nasal route 2 (two) times daily as needed. ) 30 mL 11    benzonatate (TESSALON) 100 MG capsule Take 100 mg by mouth 3 (three) times daily.  0    budesonide-formoterol 160-4.5 mcg (SYMBICORT) 160-4.5 mcg/actuation HFAA Inhale 2 puffs into the lungs every 12 (twelve) hours. 3 Inhaler 3    calcium citrate-vitamin D (CITRACAL + D) 315-200 mg-unit per tablet Take 1 tablet by mouth 2 (two) times daily.       CYCLOSPORINE (RESTASIS OPHT) Inject 0.05 % into the eye. 1 Dropperette Both eyes Twice a day .  dispense one month supply/sixty-four vials/ two trays       cyclosporine 0.05 % Drop Apply 1 drop to eye 2 (two) times daily. DISPENSE 3 MONTH SUPPLY 5.5 mL 11    fish oil-omega-3 fatty acids 300-1,000 mg capsule Take 1,400 g by mouth once daily.      flaxseed oil 1,000 mg Cap Take by mouth once daily.      fluconazole (DIFLUCAN) 100 MG tablet Take 100 mg by mouth once daily.  3    fluticasone (FLONASE) 50 mcg/actuation nasal spray       folic acid (FOLVITE) 1 MG tablet Take 1 tablet (1 mg total) by mouth once daily. 90 tablet 1    GUAIFENESIN (MUCINEX ORAL) Take 400 mg by mouth every 4 (four) hours as needed.       INV PROPULSID 10 MG Take 20 mg by mouth 4 (four) times daily as directed by physician.  For investigational use only 480 each 2    leflunomide (ARAVA) 20 MG Tab Take 1 tablet (20 mg total) by mouth once daily. 90 tablet 0    methotrexate 2.5 MG Tab Take 3 tablets (7.5 mg total) by mouth every 7 days. 12 tablet 2    montelukast (SINGULAIR) 10 mg tablet Take 1 tablet (10 mg total) by mouth nightly. 90 tablet 3    naproxen (NAPROSYN) 500 MG tablet Take 1 tablet (500 mg total) by mouth 2 (two) times daily. 180 tablet 1    omeprazole (PRILOSEC) 40 MG capsule TAKE ONE CAPSULE BY MOUTH EVERY MORNING 30 capsule 6    omeprazole-sodium bicarbonate (ZEGERID) 40-1.1 mg-gram per capsule Take 1 capsule by mouth every morning. 90 capsule 3    POTASSIUM ORAL Take by mouth once daily.      predniSONE (DELTASONE) 1 MG tablet Take 1 mg by mouth once daily.      predniSONE (DELTASONE) 5 MG tablet Take 1 tablet (5 mg total) by mouth once daily. 90 tablet 1    PREMARIN 0.625 mg tablet Take 0.625 mg by mouth once daily.  11    progesterone (PROMETRIUM) 100 MG capsule Take 100 mg by mouth every morning. 1 Capsule Oral Every day, morning      RESTASIS 0.05 % ophthalmic emulsion       rizatriptan (MAXALT) 10 MG tablet Take 10 mg by mouth as needed for Migraine.       rosuvastatin (CRESTOR) 20 MG tablet TAKE 1 TABLET EVERY DAY 90 tablet 3    sucralfate (CARAFATE) 1  "gram tablet Take 1 tablet (1 g total) by mouth 4 (four) times daily. 360 tablet 3    tofacitinib (XELJANZ XR) 11 mg Tb24 Take 11 mg by mouth once daily. 90 tablet 0    tramadol (ULTRAM) 50 mg tablet TAKE 1 TABLET DAILY AS NEEDED PAIN 30 tablet 0    valACYclovir (VALTREX) 1000 MG tablet TAKE 1 TABLET BY MOUTH TWICE A DAY AS NEEDED 20 tablet 3    VESICARE 10 mg tablet Take 1 tablet by mouth every morning.        No current facility-administered medications on file prior to visit.            Review of patient's allergies indicates:   Allergen Reactions    Actemra [tocilizumab] Other (See Comments)     Severe dizziness    Codeine Nausea And Vomiting    Gold au 198 Hives and Rash    Hydroxychloroquine Other (See Comments)     Can't remember the reaction      Iodinated contrast media - oral and iv dye Other (See Comments)     Other reaction(s): BURNING ALL OVER    Iodine Other (See Comments)     Other reaction(s): BURNING ALL OVER - Iodine dye - Not topical    Sulfa (sulfonamide antibiotics) Other (See Comments)     Can't remember the reaction    Zofran [ondansetron hcl (pf)] Nausea And Vomiting     Pt reports that last time she received zofran she started vomiting again    Methotrexate analogues Nausea Only    Pneumovax 23 [pneumococcal 23-argenis ps vaccine] Other (See Comments)     "sick"           Social History     Social History    Marital status:      Spouse name: N/A    Number of children: N/A    Years of education: N/A     Occupational History    Not on file.     Social History Main Topics    Smoking status: Never Smoker    Smokeless tobacco: Never Used    Alcohol use Yes      Comment: occasionally    Drug use: No    Sexual activity: Yes     Partners: Male     Birth control/ protection: Surgical     Other Topics Concern    Not on file     Social History Narrative    No narrative on file           ROS:  General ROS: negative for  chills, fatigue or fever  Cardiovascular ROS: no chest " "pain or dyspnea on exertion  Musculoskeletal ROS: negative for joint pain or joint stiffness.  Negative for loss of strength.  Positive for foot pain.   Neuro ROS: Negative for syncope, numbness, or muscle weakness  Skin ROS: Negative for rash, itching or nail/hair changes.           OBJECTIVE:         Vitals:    02/15/18 0803   BP: 120/81   Pulse: 70   Weight: 68 kg (150 lb)   Height: 5' 3" (1.6 m)        Right Lower extremity exam:  Vasc: Palpable pedal pulses. Feet appropriately warm to touch. Cap refill time is within normal limits   Neurological:  Light touch, proprioception, and Sharp/dull sensation are all intact. There is no Tinel's along the tarsal tunnel.    Derm: No open lesions, macerations, or rashes.  MSK:  Minimal enderness to palpation near the 4th metatarsal shaft;  minimal swelling, no bruising or redness noted.  No increased temperature note.        Imagings:    7/27/17: XRAY  Polyarthritis consistent with RA similar to old exam. No acute abnormality.   8/7/17 MRI:  Stress fracture of the 3rd metatarsal.  Periarticular erosions involving the 1st interphalangeal joint and 4th and 5th MTP joints, findings corresponding to this patient's history of rheumatoid arthritis.  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  9/12/17  MRI:   Continued visualization of abnormal bone marrow signal involving the third metatarsal with a nondisplaced fracture involving the proximal metaphysis of the third metatarsal. There has been interval increased osseous callus when compared to previous MRI dated 8/7/17.  9/21/17 XRAY  3 views    Acute nondisplaced transverse fracture identified involving the fourth metatarsal bone.  Healing fracture of the third metatarsal bone identified in satisfactory position and alignment.  DJD and inflammatory arthropathy again noted.  Pes planus.  10/3/17 xrays 3 views: There are fractures of the third and fourth metatarsals.  There is DJD of flatfoot deformity " and cystic change of the fourth metatarsal head probably degenerative.  11/2/17: Comparison: 10/3/17      Findings:Unchanged alignment of the third and fourth metatarsal fractures when compared to most recent prior. There is mild (approximately 1/2 shaft width) displacement laterally of the fourth metatarsal fracture. Minimal interval callus formation when compared to most recent prior. Unchanged pes planus.  12/5/17:  3 views    Healing fracture of the third and fourth metatarsal bones remain in unchanged and satisfactory position as compared to the previous study.  Mild DJD, osteopenia and hallux valgus.  Pes planus.  1/4/18 3 views  Healing fracture of the third and fourth metatarsal bone remain unchanged position as compared to the previous study.  DJD.        2/15/18   Narrative     Right foot AP, lateral, and oblique views. Comparison 1/4/18. The mid shafts of the third and fourth metatarsals again show incompletely healed fractures with some callus present. Position and alignment are satisfactory. No new fracture is identified.    Periarticular erosions consistent with rheumatoid arthritis are again seen in multiple joints. Small bunion is present. No significant soft tissue swelling is seen.               ASSESSMENT/PLAN:      I counseled the patient on her conditions, their implications and medical management.      Metatarsal fracture, pathologic, right, sequela  -     X-Ray Foot Complete Right; Future; Expected date: 02/15/2018    Right foot pain  -     X-Ray Foot Complete Right; Future; Expected date: 02/15/2018       · xrays were reviewed with the patient    No new fractures   · Continue rest and non-weight bearing as much as possible.  Monitor for worsening signs and symptoms.    · CAM boot.   · Continue bone stimulator another six weeks and follow up

## 2018-02-16 RX ORDER — NAPROXEN 500 MG/1
500 TABLET ORAL 2 TIMES DAILY PRN
Qty: 180 TABLET | Refills: 1 | Status: SHIPPED | OUTPATIENT
Start: 2018-02-16 | End: 2018-08-08 | Stop reason: SDUPTHER

## 2018-02-20 ENCOUNTER — OFFICE VISIT (OUTPATIENT)
Dept: UROLOGY | Facility: CLINIC | Age: 58
End: 2018-02-20
Payer: MEDICARE

## 2018-02-20 ENCOUNTER — HOSPITAL ENCOUNTER (OUTPATIENT)
Dept: RADIOLOGY | Facility: HOSPITAL | Age: 58
Discharge: HOME | End: 2018-02-20
Attending: UROLOGY
Payer: MEDICARE

## 2018-02-20 VITALS
BODY MASS INDEX: 27.94 KG/M2 | HEART RATE: 118 BPM | DIASTOLIC BLOOD PRESSURE: 73 MMHG | WEIGHT: 148 LBS | HEIGHT: 61 IN | SYSTOLIC BLOOD PRESSURE: 138 MMHG

## 2018-02-20 DIAGNOSIS — N28.1 RENAL CYST: Primary | ICD-10-CM

## 2018-02-20 DIAGNOSIS — N28.1 RENAL CYST: ICD-10-CM

## 2018-02-20 PROCEDURE — 76770 US EXAM ABDO BACK WALL COMP: CPT | Mod: TC

## 2018-02-20 PROCEDURE — 3008F BODY MASS INDEX DOCD: CPT | Mod: S$GLB,,, | Performed by: UROLOGY

## 2018-02-20 PROCEDURE — 99999 PR PBB SHADOW E&M-EST. PATIENT-LVL III: CPT | Mod: PBBFAC,,, | Performed by: UROLOGY

## 2018-02-20 PROCEDURE — 76770 US EXAM ABDO BACK WALL COMP: CPT | Mod: 26,,, | Performed by: RADIOLOGY

## 2018-02-20 PROCEDURE — 99203 OFFICE O/P NEW LOW 30 MIN: CPT | Mod: S$GLB,,, | Performed by: UROLOGY

## 2018-02-20 RX ORDER — DIPHENHYDRAMINE HCL 25 MG
50 CAPSULE ORAL ONCE
Qty: 1 CAPSULE | Refills: 0 | Status: SHIPPED | OUTPATIENT
Start: 2018-02-20 | End: 2018-02-20

## 2018-02-20 RX ORDER — PREDNISONE 10 MG/1
10 TABLET ORAL DAILY
Qty: 3 TABLET | Refills: 0 | Status: SHIPPED | OUTPATIENT
Start: 2018-02-20 | End: 2018-02-20

## 2018-02-20 RX ORDER — AZITHROMYCIN 250 MG/1
250 TABLET, FILM COATED ORAL DAILY
COMMUNITY
End: 2018-05-21 | Stop reason: ALTCHOICE

## 2018-02-20 NOTE — PROGRESS NOTES
"  Subjective:       Patient ID: Joyce Peterson is a 57 y.o. female.    Chief Complaint:  Renal mass.      History of Present Illness  HPI  Patient is a 57 y.o. female who is new to our clinic and referred by their PCP, Dr. Batista for evaluation of a renal cyst vs mass.  She was in her usual state of health.  She underwent a health screening at her [facility] in which an ultrasound was used ultrasound her carotid arteries, her thyroid, and her kidneys.  There is a small print out from the ultrasound machine showing what looks like possibly a cyst in the left kidney.    She has no urinary symptoms.  No gross hematuria.  No abdominal pain.  No flank pain.  No other complaints today.    Of note, she has a history of a reaction to IV dye which she describes as "feeling hot and not going away".  This was not an anaphylaxis reaction.  She is on prednisone currently for rheumatoid arthritis.       Review of Systems  Review of Systems  All other systems reviewed and negative except pertinent positives noted in HPI.       Objective:     Physical Exam   Constitutional: She is oriented to person, place, and time. She appears well-developed and well-nourished. She is cooperative.  Non-toxic appearance.   HENT:   Head: Normocephalic.   Cardiovascular: Normal rate, intact distal pulses and normal pulses.    Pulmonary/Chest: Effort normal. No accessory muscle usage. No tachypnea. No respiratory distress.   Abdominal: Soft. Normal appearance. She exhibits no distension, no fluid wave, no ascites and no mass. There is no tenderness. There is no rebound and no CVA tenderness. No hernia.       Liver/spleen non-palpable   Musculoskeletal: Normal range of motion.   Neurological: She is alert and oriented to person, place, and time. She has normal strength.   Skin: No bruising and no rash noted.     Psychiatric: She has a normal mood and affect. Her speech is normal and behavior is normal. Thought content normal.       Lab Review  Lab " Results   Component Value Date    COLORU Colorless (A) 08/31/2017    SPECGRAV 1.005 08/31/2017    PHUR 6.0 08/31/2017    NITRITE Negative 08/31/2017    KETONESU Negative 08/31/2017    UROBILINOGEN Negative 08/31/2017         Assessment:        1. Renal cyst            Plan:     Renal cyst  -     US Retroperitoneal Complete (Kidney and; Future; Expected date: 02/20/2018    Other orders  -     Discontinue: diphenhydrAMINE (BENADRYL) 25 mg capsule; Take 2 each (50 mg total) by mouth once. Take one hour before CT scan.  Dispense: 1 capsule; Refill: 0  -     Discontinue: predniSONE (DELTASONE) 10 MG tablet; Take 1 tablet (10 mg total) by mouth once daily.  Dispense: 3 tablet; Refill: 0      -We will obtain a formal renal ultrasound  -Patient can follow-up in the office following completion of her ultrasound to review the results.

## 2018-02-21 ENCOUNTER — OFFICE VISIT (OUTPATIENT)
Dept: UROLOGY | Facility: CLINIC | Age: 58
End: 2018-02-21
Payer: MEDICARE

## 2018-02-21 VITALS
HEIGHT: 61 IN | DIASTOLIC BLOOD PRESSURE: 64 MMHG | HEART RATE: 94 BPM | WEIGHT: 148 LBS | SYSTOLIC BLOOD PRESSURE: 132 MMHG | BODY MASS INDEX: 27.94 KG/M2

## 2018-02-21 DIAGNOSIS — N39.0 RECURRENT UTI: ICD-10-CM

## 2018-02-21 DIAGNOSIS — N28.1 RENAL CYST: Primary | ICD-10-CM

## 2018-02-21 PROCEDURE — 87088 URINE BACTERIA CULTURE: CPT

## 2018-02-21 PROCEDURE — 99213 OFFICE O/P EST LOW 20 MIN: CPT | Mod: S$GLB,,, | Performed by: UROLOGY

## 2018-02-21 PROCEDURE — 81003 URINALYSIS AUTO W/O SCOPE: CPT

## 2018-02-21 PROCEDURE — 87086 URINE CULTURE/COLONY COUNT: CPT

## 2018-02-21 PROCEDURE — 99999 PR PBB SHADOW E&M-EST. PATIENT-LVL III: CPT | Mod: PBBFAC,,, | Performed by: UROLOGY

## 2018-02-21 PROCEDURE — 51798 US URINE CAPACITY MEASURE: CPT | Mod: S$GLB,,, | Performed by: UROLOGY

## 2018-02-21 PROCEDURE — 87147 CULTURE TYPE IMMUNOLOGIC: CPT

## 2018-02-21 PROCEDURE — 3008F BODY MASS INDEX DOCD: CPT | Mod: S$GLB,,, | Performed by: UROLOGY

## 2018-02-21 NOTE — PROGRESS NOTES
Subjective:       Patient ID: Joyce Peterson is a 57 y.o. female.    Chief Complaint:  Renal mass.      History of Present Illness  HPI  Patient is a 57 y.o. female who is established to our clinic and referred by their PCP, Dr. Batista for evaluation of a renal cyst vs mass.   She underwent a health screening at [facility] in which an ultrasound was used ultrasound her carotid arteries, her thyroid, and her kidneys.  There is a small print out from the ultrasound machine showing what looks like possibly a cyst in the left kidney.    She returns today having had an ultrasound yesterday afternoon.  Ultrasound was independently reviewed today and reveals no renal abnormalities noted, question of debris or small stone in the bladder.       No gross hematuria.  No abdominal pain.  No flank pain.  No other complaints today.    Of note, she states today that she's had 3-4 urinary tract infections over the course of the past year which she did not disclose yesterday.  She currently is without dysuria.  She has some urinary frequency.  She also states that at times she goes to the bathroom almost immediately after voiding.      Review of Systems  Review of Systems  All other systems reviewed and negative except pertinent positives noted in HPI.       Objective:     Physical Exam   Constitutional: She is oriented to person, place, and time. She appears well-developed and well-nourished. No distress.   HENT:   Head: Normocephalic and atraumatic.   Eyes: No scleral icterus.   Neck: No tracheal deviation present.   Pulmonary/Chest: Effort normal. No respiratory distress.   Neurological: She is alert and oriented to person, place, and time.   Psychiatric: She has a normal mood and affect. Her behavior is normal. Judgment and thought content normal.       Lab Review  Lab Results   Component Value Date    COLORU Colorless (A) 08/31/2017    SPECGRAV 1.005 08/31/2017    PHUR 6.0 08/31/2017    NITRITE Negative 08/31/2017     KETONESU Negative 08/31/2017    UROBILINOGEN Negative 08/31/2017         Assessment:        1. Renal cyst    2. Recurrent UTI            Plan:     Renal cyst    Recurrent UTI  -     Urinalysis  -     Urine culture  -     Bladder scan      -We will obtain a repeat renal ultrasound in 6 months  -ua/urine culture  -A post void residual bladder scan was performed immediately after voiding. The patient's PVR was noted to be 0mL.   I spent 15 minutes with the patient of which more than half was spent in direct consultation with the patient in regards to our treatment and plan.

## 2018-02-22 ENCOUNTER — PATIENT MESSAGE (OUTPATIENT)
Dept: PODIATRY | Facility: CLINIC | Age: 58
End: 2018-02-22

## 2018-02-22 ENCOUNTER — PATIENT MESSAGE (OUTPATIENT)
Dept: RHEUMATOLOGY | Facility: CLINIC | Age: 58
End: 2018-02-22

## 2018-02-22 ENCOUNTER — TELEPHONE (OUTPATIENT)
Dept: RHEUMATOLOGY | Facility: CLINIC | Age: 58
End: 2018-02-22

## 2018-02-22 ENCOUNTER — TELEPHONE (OUTPATIENT)
Dept: HEMATOLOGY/ONCOLOGY | Facility: CLINIC | Age: 58
End: 2018-02-22

## 2018-02-22 DIAGNOSIS — M84.474S METATARSAL FRACTURE, PATHOLOGIC, RIGHT, SEQUELA: Primary | ICD-10-CM

## 2018-02-22 DIAGNOSIS — M06.9 RHEUMATOID ARTHRITIS INVOLVING MULTIPLE SITES, UNSPECIFIED RHEUMATOID FACTOR PRESENCE: ICD-10-CM

## 2018-02-22 DIAGNOSIS — M79.671 RIGHT FOOT PAIN: ICD-10-CM

## 2018-02-22 LAB — BACTERIA UR CULT: NORMAL

## 2018-02-22 NOTE — TELEPHONE ENCOUNTER
----- Message from Jessica Navarrete sent at 2/22/2018 10:53 AM CST -----  Contact: Pt  Pt calling regarding lab appt for today. She would like to add it to the orders for Wednesday 2/28.        Pt call back number [phone]  Spoke with pt and explained that she would have to call that provider to check if it is ok to move labs to the 28th. I am unable to move another provider's labs, pt verbalzied understanding, stated she will call their office and ask them to change the lab date.  Blessing

## 2018-02-23 ENCOUNTER — TELEPHONE (OUTPATIENT)
Dept: UROLOGY | Facility: CLINIC | Age: 58
End: 2018-02-23

## 2018-02-23 ENCOUNTER — TELEPHONE (OUTPATIENT)
Dept: PODIATRY | Facility: CLINIC | Age: 58
End: 2018-02-23

## 2018-02-23 RX ORDER — CEFDINIR 300 MG/1
300 CAPSULE ORAL 2 TIMES DAILY
Qty: 14 CAPSULE | Refills: 0 | Status: SHIPPED | OUTPATIENT
Start: 2018-02-23 | End: 2018-03-02

## 2018-02-23 NOTE — TELEPHONE ENCOUNTER
----- Message from Milena Moore sent at 2/23/2018  9:29 AM CST -----  Contact: self/home   Pt was returning Rupal's call from yesterday.

## 2018-02-23 NOTE — TELEPHONE ENCOUNTER
----- Message from Shannon Mcdonnell MA sent at 2/23/2018  8:21 AM CST -----  Contact: self   Pt is calling in regards to needing to schedule MRI. Pt can be reached at [phone]      Mri booked

## 2018-02-27 ENCOUNTER — NURSE TRIAGE (OUTPATIENT)
Dept: ADMINISTRATIVE | Facility: CLINIC | Age: 58
End: 2018-02-27

## 2018-02-27 ENCOUNTER — TELEPHONE (OUTPATIENT)
Dept: PULMONOLOGY | Facility: CLINIC | Age: 58
End: 2018-02-27

## 2018-02-27 NOTE — TELEPHONE ENCOUNTER
Caller stated the she has weakness, runny nose, cough, body aches and sore throat which started Friday. She thought is was sinus trouble due to nasal congestion. Did not receive a flu vaccine. Sister was recently diagnosed with Influenza but most recent contact was last week. Advised per protocol and she verbalized understanding. Instructed to call back with any additional problems and/or concerns. This patient did not notify me that she has a history of asthma. Noticed after she was off the phone during chart review. Will send a high priority message to pulmonology    Reason for Disposition   Probable mild influenza (no fever) or a common cold with no complications and not HIGH RISK    Protocols used: ST INFLUENZA - SEASONAL-A-[place]

## 2018-02-27 NOTE — TELEPHONE ENCOUNTER
Received a call from Triage nurse patient having mild flu like symptoms. Called to check on patient. She denies placing the call. Notified the Triage nurse who assured me she had verified the patient's 2 patient identifiers. No further action will be taken.

## 2018-02-28 ENCOUNTER — OFFICE VISIT (OUTPATIENT)
Dept: HEMATOLOGY/ONCOLOGY | Facility: CLINIC | Age: 58
End: 2018-02-28
Payer: MEDICARE

## 2018-02-28 ENCOUNTER — LAB VISIT (OUTPATIENT)
Dept: LAB | Facility: HOSPITAL | Age: 58
End: 2018-02-28
Attending: INTERNAL MEDICINE
Payer: MEDICARE

## 2018-02-28 VITALS
WEIGHT: 150.56 LBS | BODY MASS INDEX: 28.42 KG/M2 | HEART RATE: 88 BPM | OXYGEN SATURATION: 97 % | HEIGHT: 61 IN | SYSTOLIC BLOOD PRESSURE: 114 MMHG | DIASTOLIC BLOOD PRESSURE: 72 MMHG | TEMPERATURE: 99 F | RESPIRATION RATE: 18 BRPM

## 2018-02-28 DIAGNOSIS — D50.0 IRON DEFICIENCY ANEMIA DUE TO CHRONIC BLOOD LOSS: ICD-10-CM

## 2018-02-28 DIAGNOSIS — D50.9 IRON DEFICIENCY ANEMIA, UNSPECIFIED IRON DEFICIENCY ANEMIA TYPE: Primary | ICD-10-CM

## 2018-02-28 DIAGNOSIS — R94.6 ABNORMAL THYROID FUNCTION TEST: ICD-10-CM

## 2018-02-28 DIAGNOSIS — D50.0 IRON DEFICIENCY ANEMIA DUE TO CHRONIC BLOOD LOSS: Primary | ICD-10-CM

## 2018-02-28 LAB
ERYTHROCYTE [DISTWIDTH] IN BLOOD BY AUTOMATED COUNT: ABNORMAL %
FERRITIN SERPL-MCNC: 250 NG/ML
HCT VFR BLD AUTO: 38.6 %
HGB BLD-MCNC: 12.3 G/DL
IMM GRANULOCYTES # BLD AUTO: 0.02 K/UL
MCH RBC QN AUTO: 26.1 PG
MCHC RBC AUTO-ENTMCNC: 31.9 G/DL
MCV RBC AUTO: 82 FL
NEUTROPHILS # BLD AUTO: 4.7 K/UL
PLATELET # BLD AUTO: 293 K/UL
PMV BLD AUTO: 10 FL
RBC # BLD AUTO: 4.72 M/UL
T4 FREE SERPL-MCNC: 0.97 NG/DL
TSH SERPL DL<=0.005 MIU/L-ACNC: 0.27 UIU/ML
WBC # BLD AUTO: 6.38 K/UL

## 2018-02-28 PROCEDURE — 82728 ASSAY OF FERRITIN: CPT

## 2018-02-28 PROCEDURE — 84443 ASSAY THYROID STIM HORMONE: CPT

## 2018-02-28 PROCEDURE — 99999 PR PBB SHADOW E&M-EST. PATIENT-LVL V: CPT | Mod: PBBFAC,,, | Performed by: INTERNAL MEDICINE

## 2018-02-28 PROCEDURE — 99214 OFFICE O/P EST MOD 30 MIN: CPT | Mod: S$GLB,,, | Performed by: INTERNAL MEDICINE

## 2018-02-28 PROCEDURE — 36415 COLL VENOUS BLD VENIPUNCTURE: CPT

## 2018-02-28 PROCEDURE — 84439 ASSAY OF FREE THYROXINE: CPT

## 2018-02-28 PROCEDURE — 85027 COMPLETE CBC AUTOMATED: CPT

## 2018-02-28 NOTE — PROGRESS NOTES
SECTION OF HEMATOLOGY AND BONE MARROW TRANSPLANT  Return  Patient Visit   03/01/2018  Referred by:  No ref. provider found  Referred for: ROBERT    CHIEF COMPLAINT:   Chief Complaint   Patient presents with    Iron deficiency anemia, unspecified iron deficiency anemia t    Results       HISTORY OF PRESENT ILLNESS:   Referred to me November 2017  for ROBERT.  Intolerant to po iron.  Iron studies in march 2017 with severe ID.  History RA followed by Dr. Valverde.  History of gastroparesis followed by Dr. Moore in GI.  Denies fever, chills, nightsweats, bleeding, brusing, lymphadenopathy, signs/symptoms of splenomegaly.   Had upper and lower endoscopy summer 2017 unremarkable.  No VCE done.  Denies any overt GI bleeding or other bleeding.    S/p repeat IV feraheme x 2 January 2018.  Tolerated well.  Feels well.  Working with rheumatology for RA and has resumed MTX.  Comes to clinic alone.        PAST MEDICAL HISTORY:   Past Medical History:   Diagnosis Date    Acid reflux     Allergy     Anemia     Asthma     Coronary artery disease     Degenerative disc disease     Dry eyes     Dry mouth     Gastroparesis     Hyperlipidemia     Lateral meniscus derangement 4/6/2016    Osteoarthritis     Osteoporosis     Rheumatoid arthritis(714.0)     Umbilical hernia 8/13/2015       PAST SURGICAL HISTORY:   Past Surgical History:   Procedure Laterality Date    BREAST BIOPSY Left 01/29/2002    core bx    CARPAL TUNNEL RELEASE Right 05/2017    CHOLECYSTECTOMY  2004    COLONOSCOPY      10/11    COLONOSCOPY N/A 6/29/2017    Procedure: COLONOSCOPY;  Surgeon: López Moore MD;  [facility] ([address] [place] [address]);  Service: Endoscopy;  Laterality: N/A;    TONSILLECTOMY      TUBAL LIGATION  2003    UPPER GASTROINTESTINAL ENDOSCOPY      10/11    uterine ablation  2003       PAST SOCIAL HISTORY:   reports that she has never smoked. She has never used smokeless tobacco. She reports that she drinks alcohol. She reports that she  does not use drugs.    FAMILY HISTORY:  Family History   Problem Relation Age of Onset    Cancer Mother      Lung Cancer    Emphysema Mother     Heart attack Mother     Cancer Father      Lung Cancer    Osteoarthritis Father     Lung cancer Father     Skin cancer Father     Heart disease Brother     Heart attack Brother     Osteoarthritis Paternal Aunt     Retinal detachment Maternal Aunt     No Known Problems Sister     No Known Problems Maternal Uncle     No Known Problems Paternal Uncle     No Known Problems Maternal Grandmother     No Known Problems Maternal Grandfather     No Known Problems Paternal Grandmother     No Known Problems Paternal Grandfather     Colon cancer Neg Hx     Esophageal cancer Neg Hx     Stomach cancer Neg Hx     Celiac disease Neg Hx     Diabetes Neg Hx     Thyroid disease Neg Hx     Amblyopia Neg Hx     Blindness Neg Hx     Cataracts Neg Hx     Glaucoma Neg Hx     Hypertension Neg Hx     Macular degeneration Neg Hx     Strabismus Neg Hx     Stroke Neg Hx        CURRENT MEDICATIONS:   Current Outpatient Prescriptions   Medication Sig    albuterol 90 mcg/actuation inhaler Inhale 2 puffs into the lungs every 6 (six) hours as needed for Wheezing.    aspirin 81 mg Tab Take 81 mg by mouth every morning.     azelastine (ASTELIN) 137 mcg nasal spray 1 spray (137 mcg total) by Nasal route 2 (two) times daily. (Patient taking differently: 1 spray by Nasal route 2 (two) times daily as needed. )    azithromycin (Z-DEVON) 250 MG tablet Take 250 mg by mouth once daily.    benzonatate (TESSALON) 100 MG capsule Take 100 mg by mouth 3 (three) times daily.    budesonide-formoterol 160-4.5 mcg (SYMBICORT) 160-4.5 mcg/actuation HFAA Inhale 2 puffs into the lungs every 12 (twelve) hours.    calcium citrate-vitamin D (CITRACAL + D) 315-200 mg-unit per tablet Take 1 tablet by mouth 2 (two) times daily.     cefdinir (OMNICEF) 300 MG capsule Take 1 capsule (300 mg total) by  mouth 2 (two) times daily.    CYCLOSPORINE (RESTASIS OPHT) Inject 0.05 % into the eye. 1 Dropperette Both eyes Twice a day .  dispense one month supply/sixty-four vials/ two trays    cyclosporine 0.05 % Drop Apply 1 drop to eye 2 (two) times daily. DISPENSE 3 MONTH SUPPLY    fish oil-omega-3 fatty acids 300-1,000 mg capsule Take 1,400 g by mouth once daily.    flaxseed oil 1,000 mg Cap Take by mouth once daily.    fluconazole (DIFLUCAN) 100 MG tablet Take 100 mg by mouth once daily.    fluticasone (FLONASE) 50 mcg/actuation nasal spray     folic acid (FOLVITE) 1 MG tablet Take 1 tablet (1 mg total) by mouth once daily.    GUAIFENESIN (MUCINEX ORAL) Take 400 mg by mouth every 4 (four) hours as needed.     INV PROPULSID 10 MG Take 20 mg by mouth 4 (four) times daily as directed by physician.  For investigational use only    leflunomide (ARAVA) 20 MG Tab Take 1 tablet (20 mg total) by mouth once daily.    methotrexate 2.5 MG Tab Take 3 tablets (7.5 mg total) by mouth every 7 days.    montelukast (SINGULAIR) 10 mg tablet Take 1 tablet (10 mg total) by mouth nightly.    naproxen (NAPROSYN) 500 MG tablet Take 1 tablet (500 mg total) by mouth 2 (two) times daily as needed.    omeprazole (PRILOSEC) 40 MG capsule TAKE ONE CAPSULE BY MOUTH EVERY MORNING    omeprazole-sodium bicarbonate (ZEGERID) 40-1.1 mg-gram per capsule Take 1 capsule by mouth every morning.    POTASSIUM ORAL Take by mouth once daily.    predniSONE (DELTASONE) 1 MG tablet Take 1 mg by mouth once daily.    predniSONE (DELTASONE) 5 MG tablet Take 1 tablet (5 mg total) by mouth once daily.    PREMARIN 0.625 mg tablet Take 0.625 mg by mouth once daily.    progesterone (PROMETRIUM) 100 MG capsule Take 100 mg by mouth every morning. 1 Capsule Oral Every day, morning    RESTASIS 0.05 % ophthalmic emulsion     rizatriptan (MAXALT) 10 MG tablet Take 10 mg by mouth as needed for Migraine.     rosuvastatin (CRESTOR) 20 MG tablet TAKE 1 TABLET  "EVERY DAY    sucralfate (CARAFATE) 1 gram tablet Take 1 tablet (1 g total) by mouth 4 (four) times daily.    tofacitinib (XELJANZ XR) 11 mg Tb24 Take 11 mg by mouth once daily.    tramadol (ULTRAM) 50 mg tablet TAKE 1 TABLET DAILY AS NEEDED PAIN    valACYclovir (VALTREX) 1000 MG tablet TAKE 1 TABLET BY MOUTH TWICE A DAY AS NEEDED    VESICARE 10 mg tablet Take 1 tablet by mouth every morning.      No current facility-administered medications for this visit.      ALLERGIES:   Review of patient's allergies indicates:   Allergen Reactions    Actemra [tocilizumab] Other (See Comments)     Severe dizziness    Codeine Nausea And Vomiting    Gold au 198 Hives and Rash    Hydroxychloroquine Other (See Comments)     Can't remember the reaction      Iodinated contrast- oral and iv dye Other (See Comments)     Other reaction(s): BURNING ALL OVER    Iodine Other (See Comments)     Other reaction(s): BURNING ALL OVER - Iodine dye - Not topical    Sulfa (sulfonamide antibiotics) Other (See Comments)     Can't remember the reaction    Zofran [ondansetron hcl (pf)] Nausea And Vomiting     Pt reports that last time she received zofran she started vomiting again    Methotrexate analogues Nausea Only    Pneumovax 23 [pneumococcal 23-argenis ps vaccine] Other (See Comments)     "sick"             REVIEW OF SYSTEMS:   General ROS: negative  Psychological ROS: negative  Ophthalmic ROS: negative  ENT ROS: negative  Allergy and Immunology ROS: negative  Hematological and Lymphatic ROS: negative  Endocrine ROS: negative  Respiratory ROS: negative  Cardiovascular ROS: negative  Gastrointestinal ROS: see HPI  Genito-Urinary ROS: negative  Musculoskeletal ROS: see HPI  Neurological ROS: negative  Dermatological ROS: negative    PHYSICAL EXAM:   Vitals:    02/28/18 1448   BP: 114/72   Pulse: 88   Resp: 18   Temp: 98.7 °F (37.1 °C)       General - well developed, well nourished, no apparent distress  Head & Face - no sinus " tenderness  Eyes - normal conjunctivae and lids   ENT - normal external auditory canals and tympanic membranes bilaterally oropharynx clear,  Normal dentition and gums  Neck - normal thyroid  Chest and Lung - normal respiratory effort, clear to auscultation bilaterally   Cardiovascular - RRR with no MGR, normal S1 and S2; no pedal edema  Abdomen -  soft, nontender, no palpable hepatomegaly or splenomegaly  Lymph - no palpable lymphadenopathy  Extremities - changes of RA   Heme - no bruising, petechiae, pallor  Skin - no rashes or lesions  Psych - appropriate mood and affect      ECOG Performance Status: (foot note - ECOG PS provided by [employer]) 1 - Symptomatic but completely ambulatory    Karnofsky Performance Score:  90%- Able to Carry on Normal Activity: Minor Symptoms of Disease  DATA:   Lab Results   Component Value Date    WBC 6.38 02/28/2018    HGB 12.3 02/28/2018    HCT 38.6 02/28/2018    MCV 82 02/28/2018     02/28/2018     Gran # (ANC)   Date Value Ref Range Status   02/28/2018 4.7 1.8 - 7.7 K/uL Final     Comment:     The ANC is based on a white cell differential from an   automated cell counter. It has not been microscopically   reviewed for the presence of abnormal cells. Clinical   correlation is required.       CMP  Sodium   Date Value Ref Range Status   01/15/2018 140 136 - 145 mmol/L Final     Potassium   Date Value Ref Range Status   01/15/2018 4.1 3.5 - 5.1 mmol/L Final     Chloride   Date Value Ref Range Status   01/15/2018 107 95 - 110 mmol/L Final     CO2   Date Value Ref Range Status   01/15/2018 26 23 - 29 mmol/L Final     Glucose   Date Value Ref Range Status   01/15/2018 116 (H) 70 - 110 mg/dL Final     BUN, Bld   Date Value Ref Range Status   01/15/2018 15 6 - 20 mg/dL Final     Creatinine   Date Value Ref Range Status   01/15/2018 0.9 0.5 - 1.4 mg/dL Final     Calcium   Date Value Ref Range Status   01/15/2018 9.1 8.7 - 10.5 mg/dL Final     Total Protein    Date Value Ref Range Status   01/15/2018 6.8 6.0 - 8.4 g/dL Final     Albumin   Date Value Ref Range Status   01/15/2018 3.6 3.5 - 5.2 g/dL Final     Total Bilirubin   Date Value Ref Range Status   01/15/2018 0.2 0.1 - 1.0 mg/dL Final     Comment:     For infants and newborns, interpretation of results should be based  on gestational age, weight and in agreement with clinical  observations.  Premature Infant recommended reference ranges:  Up to 24 hours.............<8.0 mg/dL  Up to 48 hours............<12.0 mg/dL  3-5 days..................<15.0 mg/dL  6-29 days.................<15.0 mg/dL       Alkaline Phosphatase   Date Value Ref Range Status   01/15/2018 100 55 - 135 U/L Final     AST   Date Value Ref Range Status   01/15/2018 27 10 - 40 U/L Final     ALT   Date Value Ref Range Status   01/15/2018 26 10 - 44 U/L Final     Anion Gap   Date Value Ref Range Status   01/15/2018 7 (L) 8 - 16 mmol/L Final     eGFR if    Date Value Ref Range Status   01/15/2018 >60.0 >60 mL/min/1.73 m^2 Final     eGFR if non    Date Value Ref Range Status   01/15/2018 >60.0 >60 mL/min/1.73 m^2 Final     Comment:     Calculation used to obtain the estimated glomerular filtration  rate (eGFR) is the CKD-EPI equation.        Lab Results   Component Value Date    IRON 22 (L) 11/13/2017    TIBC 545 (H) 11/13/2017    FERRITIN 250 02/28/2018         ASSESSMENT AND PLAN:   Encounter Diagnosis   Name Primary?    Iron deficiency anemia, unspecified iron deficiency anemia type Yes        Microcytic anemia likely ROBERT  due to malabsorption related to GI issues ; possible anemia of chronic inflammation from RA and meds    contributing  Denies any over GI bleeding; Recent summer 2017 upper and lower GI endoscopy negative; VCE deferred but may need if ROBERT persists  Intolerant to po iron   S/p feraheme x 2 jan 2018 with normalization of hgb and ferritin   Has fu with GI in April 2018   Fu with rheum for RA  Recommend  serial lab monitoring for further prn iron infusion needs        Follow Up:     -cbc, ferritin lab only in 3 months  -same labs and md appt in 6   months    Michael Woodson MD  Hematology/Oncology/Bone Marrow Transplant

## 2018-03-01 ENCOUNTER — HOSPITAL ENCOUNTER (OUTPATIENT)
Dept: RADIOLOGY | Facility: HOSPITAL | Age: 58
Discharge: HOME | End: 2018-03-01
Attending: PODIATRIST
Payer: MEDICARE

## 2018-03-01 ENCOUNTER — PATIENT MESSAGE (OUTPATIENT)
Dept: ENDOCRINOLOGY | Facility: CLINIC | Age: 58
End: 2018-03-01

## 2018-03-01 ENCOUNTER — INFUSION (OUTPATIENT)
Dept: INFECTIOUS DISEASES | Facility: HOSPITAL | Age: 58
End: 2018-03-01
Attending: INTERNAL MEDICINE
Payer: MEDICARE

## 2018-03-01 VITALS — BODY MASS INDEX: 28.42 KG/M2 | HEIGHT: 61 IN | WEIGHT: 150.56 LBS

## 2018-03-01 DIAGNOSIS — M84.474S METATARSAL FRACTURE, PATHOLOGIC, RIGHT, SEQUELA: ICD-10-CM

## 2018-03-01 DIAGNOSIS — E05.80 IATROGENIC THYROTOXICOSIS: Primary | ICD-10-CM

## 2018-03-01 DIAGNOSIS — M79.671 RIGHT FOOT PAIN: ICD-10-CM

## 2018-03-01 DIAGNOSIS — M06.9 RHEUMATOID ARTHRITIS INVOLVING MULTIPLE SITES, UNSPECIFIED RHEUMATOID FACTOR PRESENCE: ICD-10-CM

## 2018-03-01 DIAGNOSIS — M85.80 OSTEOPENIA, UNSPECIFIED LOCATION: Primary | ICD-10-CM

## 2018-03-01 PROCEDURE — 63600175 PHARM REV CODE 636 W HCPCS: Mod: JG | Performed by: INTERNAL MEDICINE

## 2018-03-01 PROCEDURE — 96372 THER/PROPH/DIAG INJ SC/IM: CPT

## 2018-03-01 RX ADMIN — DENOSUMAB 60 MG: 60 INJECTION SUBCUTANEOUS at 09:03

## 2018-03-05 ENCOUNTER — HOSPITAL ENCOUNTER (OUTPATIENT)
Dept: RADIOLOGY | Facility: HOSPITAL | Age: 58
Discharge: HOME | End: 2018-03-05
Attending: PODIATRIST
Payer: MEDICARE

## 2018-03-05 ENCOUNTER — PATIENT MESSAGE (OUTPATIENT)
Dept: ENDOCRINOLOGY | Facility: CLINIC | Age: 58
End: 2018-03-05

## 2018-03-05 ENCOUNTER — PATIENT MESSAGE (OUTPATIENT)
Dept: HEMATOLOGY/ONCOLOGY | Facility: CLINIC | Age: 58
End: 2018-03-05

## 2018-03-05 PROCEDURE — 73718 MRI LOWER EXTREMITY W/O DYE: CPT | Mod: 26,RT,, | Performed by: RADIOLOGY

## 2018-03-05 PROCEDURE — 73718 MRI LOWER EXTREMITY W/O DYE: CPT | Mod: TC,RT

## 2018-03-06 ENCOUNTER — PATIENT MESSAGE (OUTPATIENT)
Dept: PODIATRY | Facility: CLINIC | Age: 58
End: 2018-03-06

## 2018-03-07 ENCOUNTER — PATIENT MESSAGE (OUTPATIENT)
Dept: RHEUMATOLOGY | Facility: CLINIC | Age: 58
End: 2018-03-07

## 2018-03-12 ENCOUNTER — PATIENT MESSAGE (OUTPATIENT)
Dept: RHEUMATOLOGY | Facility: CLINIC | Age: 58
End: 2018-03-12

## 2018-03-12 DIAGNOSIS — M06.9 RHEUMATOID ARTHRITIS INVOLVING MULTIPLE SITES, UNSPECIFIED RHEUMATOID FACTOR PRESENCE: Primary | ICD-10-CM

## 2018-03-12 RX ORDER — METHOTREXATE 25 MG/ML
INJECTION, SOLUTION INTRA-ARTERIAL; INTRAMUSCULAR; INTRAVENOUS
Qty: 10 ML | Refills: 0 | Status: SHIPPED | OUTPATIENT
Start: 2018-03-12 | End: 2018-04-05 | Stop reason: SDUPTHER

## 2018-03-16 ENCOUNTER — PATIENT MESSAGE (OUTPATIENT)
Dept: RHEUMATOLOGY | Facility: CLINIC | Age: 58
End: 2018-03-16

## 2018-03-20 ENCOUNTER — OFFICE VISIT (OUTPATIENT)
Dept: PODIATRY | Facility: CLINIC | Age: 58
End: 2018-03-20
Payer: MEDICARE

## 2018-03-20 VITALS — HEIGHT: 61 IN | WEIGHT: 150 LBS | BODY MASS INDEX: 28.32 KG/M2

## 2018-03-20 DIAGNOSIS — M84.474S METATARSAL FRACTURE, PATHOLOGIC, RIGHT, SEQUELA: Primary | ICD-10-CM

## 2018-03-20 DIAGNOSIS — M06.9 RHEUMATOID ARTHRITIS INVOLVING MULTIPLE SITES, UNSPECIFIED RHEUMATOID FACTOR PRESENCE: ICD-10-CM

## 2018-03-20 DIAGNOSIS — M79.671 RIGHT FOOT PAIN: ICD-10-CM

## 2018-03-20 PROCEDURE — 99213 OFFICE O/P EST LOW 20 MIN: CPT | Mod: S$PBB,,, | Performed by: PODIATRIST

## 2018-03-20 PROCEDURE — 99999 PR PBB SHADOW E&M-EST. PATIENT-LVL II: CPT | Mod: PBBFAC,,, | Performed by: PODIATRIST

## 2018-03-20 NOTE — PROGRESS NOTES
Chief Complaint   Patient presents with    Foot Pain     fracture right foot    Follow-up          HPI:       Joyce Peterson is a 57 y.o. female who presents to clinic for follow up right foot pain  She has history of Rheumatoid Arthritis and osteoporosis.   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   She is starting to taper off Prednisone.    She has been using the CAM, hasn't used her bone stimulator recently.         Past Medical History:   Diagnosis Date    Acid reflux     Allergy     Anemia     Asthma     Coronary artery disease     Degenerative disc disease     Dry eyes     Dry mouth     Gastroparesis     Hyperlipidemia     Lateral meniscus derangement 4/6/2016    Osteoarthritis     Osteoporosis     Rheumatoid arthritis(714.0)     Umbilical hernia 8/13/2015             Current Outpatient Prescriptions on File Prior to Visit   Medication Sig Dispense Refill    albuterol 90 mcg/actuation inhaler Inhale 2 puffs into the lungs every 6 (six) hours as needed for Wheezing. 18 g 11    aspirin 81 mg Tab Take 81 mg by mouth every morning.       azelastine (ASTELIN) 137 mcg nasal spray 1 spray (137 mcg total) by Nasal route 2 (two) times daily. (Patient taking differently: 1 spray by Nasal route 2 (two) times daily as needed. ) 30 mL 11    azithromycin (Z-DEVON) 250 MG tablet Take 250 mg by mouth once daily.      benzonatate (TESSALON) 100 MG capsule Take 100 mg by mouth 3 (three) times daily.  0    budesonide-formoterol 160-4.5 mcg (SYMBICORT) 160-4.5 mcg/actuation HFAA Inhale 2 puffs into the lungs every 12 (twelve) hours. 3 Inhaler 3    calcium citrate-vitamin D (CITRACAL + D) 315-200 mg-unit per tablet Take 1 tablet by mouth 2 (two) times daily.       CYCLOSPORINE (RESTASIS OPHT) Inject 0.05 % into the eye. 1 Dropperette Both eyes Twice a day .  dispense one month supply/sixty-four vials/ two trays      cyclosporine 0.05 % Drop Apply 1 drop to eye  2 (two) times daily. DISPENSE 3 MONTH SUPPLY 5.5 mL 11    fish oil-omega-3 fatty acids 300-1,000 mg capsule Take 1,400 g by mouth once daily.      flaxseed oil 1,000 mg Cap Take by mouth once daily.      fluconazole (DIFLUCAN) 100 MG tablet Take 100 mg by mouth once daily.  3    fluticasone (FLONASE) 50 mcg/actuation nasal spray       folic acid (FOLVITE) 1 MG tablet Take 1 tablet (1 mg total) by mouth once daily. 90 tablet 1    GUAIFENESIN (MUCINEX ORAL) Take 400 mg by mouth every 4 (four) hours as needed.       INV PROPULSID 10 MG Take 20 mg by mouth 4 (four) times daily as directed by physician.  For investigational use only 480 each 2    leflunomide (ARAVA) 20 MG Tab Take 1 tablet (20 mg total) by mouth once daily. 90 tablet 0    methotrexate 25 mg/mL injection 0.3 ml(7.5mg) sc once a week 10 mL 0    montelukast (SINGULAIR) 10 mg tablet Take 1 tablet (10 mg total) by mouth nightly. 90 tablet 3    naproxen (NAPROSYN) 500 MG tablet Take 1 tablet (500 mg total) by mouth 2 (two) times daily as needed. 180 tablet 1    omeprazole (PRILOSEC) 40 MG capsule TAKE ONE CAPSULE BY MOUTH EVERY MORNING 30 capsule 6    omeprazole-sodium bicarbonate (ZEGERID) 40-1.1 mg-gram per capsule Take 1 capsule by mouth every morning. 90 capsule 3    POTASSIUM ORAL Take by mouth once daily.      predniSONE (DELTASONE) 1 MG tablet Take 1 mg by mouth once daily.      predniSONE (DELTASONE) 5 MG tablet Take 1 tablet (5 mg total) by mouth once daily. 90 tablet 1    PREMARIN 0.625 mg tablet Take 0.625 mg by mouth once daily.  11    progesterone (PROMETRIUM) 100 MG capsule Take 100 mg by mouth every morning. 1 Capsule Oral Every day, morning      RESTASIS 0.05 % ophthalmic emulsion       rizatriptan (MAXALT) 10 MG tablet Take 10 mg by mouth as needed for Migraine.       rosuvastatin (CRESTOR) 20 MG tablet TAKE 1 TABLET EVERY DAY 90 tablet 3    sucralfate (CARAFATE) 1 gram tablet Take 1 tablet (1 g total) by mouth 4 (four)  "times daily. 360 tablet 3    syringe with needle (TUBERCULIN SYRINGE) 1 mL 27 x 1/2" Syrg To administer methotrexate 7.5mg sc once a week 12 Syringe 3    tofacitinib (XELJANZ XR) 11 mg Tb24 Take 11 mg by mouth once daily. 90 tablet 0    tramadol (ULTRAM) 50 mg tablet TAKE 1 TABLET DAILY AS NEEDED PAIN 30 tablet 0    valACYclovir (VALTREX) 1000 MG tablet TAKE 1 TABLET BY MOUTH TWICE A DAY AS NEEDED 20 tablet 3    VESICARE 10 mg tablet Take 1 tablet by mouth every morning.        No current facility-administered medications on file prior to visit.            Review of patient's allergies indicates:   Allergen Reactions    Actemra [tocilizumab] Other (See Comments)     Severe dizziness    Codeine Nausea And Vomiting    Gold au 198 Hives and Rash    Hydroxychloroquine Other (See Comments)     Can't remember the reaction      Iodinated contrast media - oral and iv dye Other (See Comments)     Other reaction(s): BURNING ALL OVER    Iodine Other (See Comments)     Other reaction(s): BURNING ALL OVER - Iodine dye - Not topical    Sulfa (sulfonamide antibiotics) Other (See Comments)     Can't remember the reaction    Zofran [ondansetron hcl (pf)] Nausea And Vomiting     Pt reports that last time she received zofran she started vomiting again    Methotrexate analogues Nausea Only    Pneumovax 23 [pneumococcal 23-argenis ps vaccine] Other (See Comments)     "sick"           Social History     Social History    Marital status:      Spouse name: N/A    Number of children: N/A    Years of education: N/A     Occupational History    Not on file.     Social History Main Topics    Smoking status: Never Smoker    Smokeless tobacco: Never Used    Alcohol use Yes      Comment: occasionally    Drug use: No    Sexual activity: Yes     Partners: Male     Birth control/ protection: Surgical     Other Topics Concern    Not on file     Social History Narrative    No narrative on file           ROS:  General ROS: " "negative for  chills, fatigue or fever  Cardiovascular ROS: no chest pain or dyspnea on exertion  Musculoskeletal ROS: negative for joint pain or joint stiffness.  Negative for loss of strength.  Positive for foot pain.   Neuro ROS: Negative for syncope, numbness, or muscle weakness  Skin ROS: Negative for rash, itching or nail/hair changes.           OBJECTIVE:         Vitals:    03/20/18 1306   Weight: 68 kg (150 lb)   Height: 5' 1" (1.549 m)        Right Lower extremity exam:  Vasc: Palpable pedal pulses. Feet appropriately warm to touch. Cap refill time is within normal limits   Neurological:  Light touch, proprioception, and Sharp/dull sensation are all intact. There is no Tinel's along the tarsal tunnel.    Derm: No open lesions, macerations, or rashes.  MSK:  Minimal enderness to palpation near the 4th metatarsal shaft;  minimal swelling, no bruising or redness noted.  No increased temperature note.        Imagings:    7/27/17: XRAY  Polyarthritis consistent with RA similar to old exam. No acute abnormality.   8/7/17 MRI:  Stress fracture of the 3rd metatarsal.  Periarticular erosions involving the 1st interphalangeal joint and 4th and 5th MTP joints, findings corresponding to this patient's history of rheumatoid arthritis.  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  9/12/17  MRI:   Continued visualization of abnormal bone marrow signal involving the third metatarsal with a nondisplaced fracture involving the proximal metaphysis of the third metatarsal. There has been interval increased osseous callus when compared to previous MRI dated 8/7/17.  9/21/17 XRAY  3 views    Acute nondisplaced transverse fracture identified involving the fourth metatarsal bone.  Healing fracture of the third metatarsal bone identified in satisfactory position and alignment.  DJD and inflammatory arthropathy again noted.  Pes planus.  10/3/17 xrays 3 views: There are fractures of the third and fourth " metatarsals.  There is DJD of flatfoot deformity and cystic change of the fourth metatarsal head probably degenerative.  11/2/17: Comparison: 10/3/17      Findings:Unchanged alignment of the third and fourth metatarsal fractures when compared to most recent prior. There is mild (approximately 1/2 shaft width) displacement laterally of the fourth metatarsal fracture. Minimal interval callus formation when compared to most recent prior. Unchanged pes planus.  12/5/17:  3 views    Healing fracture of the third and fourth metatarsal bones remain in unchanged and satisfactory position as compared to the previous study.  Mild DJD, osteopenia and hallux valgus.  Pes planus.  1/4/18 3 views  Healing fracture of the third and fourth metatarsal bone remain unchanged position as compared to the previous study.  DJD.      2/15/18   Narrative     Right foot AP, lateral, and oblique views. Comparison 1/4/18. The mid shafts of the third and fourth metatarsals again show incompletely healed fractures with some callus present. Position and alignment are satisfactory. No new fracture is identified.    Periarticular erosions consistent with rheumatoid arthritis are again seen in multiple joints. Small bunion is present. No significant soft tissue swelling is seen.         3/5/18  MRI Impression      Chronic fractures midshaft RIGHT 3rd and 4th metatarsals with callus formation.  Osseous edema RIGHT 5th metatarsal, likely nondisplaced fracture at that level.             ASSESSMENT/PLAN:      I counseled the patient on her conditions, their implications and medical management.      Metatarsal fracture, pathologic, right, sequela    Right foot pain    Rheumatoid arthritis involving multiple sites, unspecified rheumatoid factor presence       · Continue rest and non-weight bearing as much as possible.  Monitor for worsening signs and symptoms.    · CAM boot as much possible .   · Continue bone stimulator another six weeks and follow up

## 2018-03-22 DIAGNOSIS — M06.9 RHEUMATOID ARTHRITIS INVOLVING MULTIPLE SITES, UNSPECIFIED RHEUMATOID FACTOR PRESENCE: Primary | ICD-10-CM

## 2018-03-22 RX ORDER — PREDNISONE 5 MG/1
5 TABLET ORAL DAILY
Qty: 30 TABLET | Refills: 1 | Status: SHIPPED | OUTPATIENT
Start: 2018-03-22 | End: 2018-05-21

## 2018-03-27 ENCOUNTER — PATIENT MESSAGE (OUTPATIENT)
Dept: UROLOGY | Facility: CLINIC | Age: 58
End: 2018-03-27

## 2018-03-27 DIAGNOSIS — N30.01 ACUTE CYSTITIS WITH HEMATURIA: Primary | ICD-10-CM

## 2018-03-27 RX ORDER — AMOXICILLIN AND CLAVULANATE POTASSIUM 875; 125 MG/1; MG/1
1 TABLET, FILM COATED ORAL 2 TIMES DAILY
Qty: 20 TABLET | Refills: 0 | Status: SHIPPED | OUTPATIENT
Start: 2018-03-27 | End: 2018-04-06

## 2018-03-28 ENCOUNTER — LAB VISIT (OUTPATIENT)
Dept: LAB | Facility: HOSPITAL | Age: 58
End: 2018-03-28
Attending: UROLOGY
Payer: MEDICARE

## 2018-03-28 DIAGNOSIS — N30.01 ACUTE CYSTITIS WITH HEMATURIA: ICD-10-CM

## 2018-03-28 PROCEDURE — 87147 CULTURE TYPE IMMUNOLOGIC: CPT

## 2018-03-28 PROCEDURE — 87088 URINE BACTERIA CULTURE: CPT

## 2018-03-28 PROCEDURE — 87086 URINE CULTURE/COLONY COUNT: CPT

## 2018-03-28 RX ORDER — PREDNISONE 1 MG/1
2 TABLET ORAL DAILY
Qty: 180 TABLET | Refills: 1 | OUTPATIENT
Start: 2018-03-28 | End: 2018-04-07

## 2018-03-29 ENCOUNTER — PATIENT MESSAGE (OUTPATIENT)
Dept: RHEUMATOLOGY | Facility: CLINIC | Age: 58
End: 2018-03-29

## 2018-03-29 LAB — BACTERIA UR CULT: NORMAL

## 2018-03-29 RX ORDER — PREDNISONE 1 MG/1
4 TABLET ORAL DAILY
Qty: 120 TABLET | Refills: 1 | Status: SHIPPED | OUTPATIENT
Start: 2018-03-29 | End: 2018-05-21 | Stop reason: SDUPTHER

## 2018-04-02 ENCOUNTER — PATIENT MESSAGE (OUTPATIENT)
Dept: ENDOCRINOLOGY | Facility: CLINIC | Age: 58
End: 2018-04-02

## 2018-04-02 ENCOUNTER — TELEPHONE (OUTPATIENT)
Dept: ENDOCRINOLOGY | Facility: CLINIC | Age: 58
End: 2018-04-02

## 2018-04-02 NOTE — TELEPHONE ENCOUNTER
----- Message from Eneida Damon sent at 4/2/2018 10:53 AM CDT -----  Contact: patient  Patient received her recall letter for May but there was nothing until 8/21/18 for a Thyroid f/u, patient stated that she gets labs & an US before seeing the dr.  Please if the nurse would put the orders in for the patient I did put her on a wait list as well.  Patient's # is [phone]

## 2018-04-02 NOTE — TELEPHONE ENCOUNTER
I have patient scheduled for an appointment with Dr. Forbes in June with lab work ordered by Dr. Forbes scheduled.  Patient explains that, she usually has an Ultrasound ordered very year but, no orders are in.  Would you like to have one ordered for patient?

## 2018-04-05 DIAGNOSIS — M06.9 RHEUMATOID ARTHRITIS INVOLVING MULTIPLE SITES, UNSPECIFIED RHEUMATOID FACTOR PRESENCE: ICD-10-CM

## 2018-04-06 RX ORDER — METHOTREXATE 25 MG/ML
INJECTION, SOLUTION INTRA-ARTERIAL; INTRAMUSCULAR; INTRAVENOUS
Qty: 2 ML | Refills: 0 | Status: SHIPPED | OUTPATIENT
Start: 2018-04-06 | End: 2018-05-21 | Stop reason: SDUPTHER

## 2018-04-11 ENCOUNTER — TELEPHONE (OUTPATIENT)
Dept: UROLOGY | Facility: CLINIC | Age: 58
End: 2018-04-11

## 2018-04-11 ENCOUNTER — PATIENT MESSAGE (OUTPATIENT)
Dept: RHEUMATOLOGY | Facility: CLINIC | Age: 58
End: 2018-04-11

## 2018-04-11 ENCOUNTER — PATIENT MESSAGE (OUTPATIENT)
Dept: UROLOGY | Facility: CLINIC | Age: 58
End: 2018-04-11

## 2018-04-11 DIAGNOSIS — N39.0 RECURRENT UTI: Primary | ICD-10-CM

## 2018-04-11 NOTE — TELEPHONE ENCOUNTER
Raul--this patient emailed me regarding wanting a urine culture done after finishing her antibiotics.  She mentioned Friday.  I told her she did not need an appointment with me for this, but can you arrange to have this done at her convenience.  I've ordered the urine culture.  The patient can be reached at [phone].    Thank you.   Ricardo

## 2018-04-13 ENCOUNTER — LAB VISIT (OUTPATIENT)
Dept: LAB | Facility: HOSPITAL | Age: 58
End: 2018-04-13
Attending: UROLOGY
Payer: MEDICARE

## 2018-04-13 DIAGNOSIS — N39.0 RECURRENT UTI: ICD-10-CM

## 2018-04-13 PROCEDURE — 87086 URINE CULTURE/COLONY COUNT: CPT

## 2018-04-14 LAB — BACTERIA UR CULT: NORMAL

## 2018-04-24 ENCOUNTER — OFFICE VISIT (OUTPATIENT)
Dept: PODIATRY | Facility: CLINIC | Age: 58
End: 2018-04-24
Payer: MEDICARE

## 2018-04-24 ENCOUNTER — HOSPITAL ENCOUNTER (OUTPATIENT)
Dept: RADIOLOGY | Facility: HOSPITAL | Age: 58
Discharge: HOME | End: 2018-04-24
Attending: PODIATRIST
Payer: MEDICARE

## 2018-04-24 VITALS — BODY MASS INDEX: 28.32 KG/M2 | HEIGHT: 61 IN | WEIGHT: 150 LBS

## 2018-04-24 DIAGNOSIS — M84.474S METATARSAL FRACTURE, PATHOLOGIC, RIGHT, SEQUELA: ICD-10-CM

## 2018-04-24 DIAGNOSIS — M84.474S METATARSAL FRACTURE, PATHOLOGIC, RIGHT, SEQUELA: Primary | ICD-10-CM

## 2018-04-24 PROCEDURE — 73630 X-RAY EXAM OF FOOT: CPT | Mod: TC,PO,RT

## 2018-04-24 PROCEDURE — 99213 OFFICE O/P EST LOW 20 MIN: CPT | Mod: S$GLB,,, | Performed by: PODIATRIST

## 2018-04-24 PROCEDURE — 99999 PR PBB SHADOW E&M-EST. PATIENT-LVL IV: CPT | Mod: PBBFAC,,, | Performed by: PODIATRIST

## 2018-04-24 PROCEDURE — 73630 X-RAY EXAM OF FOOT: CPT | Mod: 26,RT,, | Performed by: RADIOLOGY

## 2018-04-24 NOTE — PROGRESS NOTES
Chief Complaint   Patient presents with    Follow-up     dr marychuy jimenes     Foot Pain          HPI:       Joyce Peterson is a 57 y.o. female who presents to clinic for follow up right foot pain  She has history of Rheumatoid Arthritis and osteoporosis.   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   She is starting to taper off Prednisone.    She has been using the Darco shoe and bone stimulator.         Past Medical History:   Diagnosis Date    Acid reflux     Allergy     Anemia     Asthma     Coronary artery disease     Degenerative disc disease     Dry eyes     Dry mouth     Gastroparesis     Hyperlipidemia     Lateral meniscus derangement 4/6/2016    Osteoarthritis     Osteoporosis     Rheumatoid arthritis(714.0)     Umbilical hernia 8/13/2015             Current Outpatient Prescriptions on File Prior to Visit   Medication Sig Dispense Refill    albuterol 90 mcg/actuation inhaler Inhale 2 puffs into the lungs every 6 (six) hours as needed for Wheezing. 18 g 11    aspirin 81 mg Tab Take 81 mg by mouth every morning.       azelastine (ASTELIN) 137 mcg nasal spray 1 spray (137 mcg total) by Nasal route 2 (two) times daily. (Patient taking differently: 1 spray by Nasal route 2 (two) times daily as needed. ) 30 mL 11    azithromycin (Z-DEVON) 250 MG tablet Take 250 mg by mouth once daily.      benzonatate (TESSALON) 100 MG capsule Take 100 mg by mouth 3 (three) times daily.  0    budesonide-formoterol 160-4.5 mcg (SYMBICORT) 160-4.5 mcg/actuation HFAA Inhale 2 puffs into the lungs every 12 (twelve) hours. 3 Inhaler 3    calcium citrate-vitamin D (CITRACAL + D) 315-200 mg-unit per tablet Take 1 tablet by mouth 2 (two) times daily.       CYCLOSPORINE (RESTASIS OPHT) Inject 0.05 % into the eye. 1 Dropperette Both eyes Twice a day .  dispense one month supply/sixty-four vials/ two trays      cyclosporine 0.05 % Drop Apply 1 drop to eye 2 (two) times  daily. DISPENSE 3 MONTH SUPPLY 5.5 mL 11    fish oil-omega-3 fatty acids 300-1,000 mg capsule Take 1,400 g by mouth once daily.      flaxseed oil 1,000 mg Cap Take by mouth once daily.      fluconazole (DIFLUCAN) 100 MG tablet Take 100 mg by mouth once daily.  3    fluticasone (FLONASE) 50 mcg/actuation nasal spray       folic acid (FOLVITE) 1 MG tablet Take 1 tablet (1 mg total) by mouth once daily. 90 tablet 1    GUAIFENESIN (MUCINEX ORAL) Take 400 mg by mouth every 4 (four) hours as needed.       INV PROPULSID 10 MG Take 20 mg by mouth 4 (four) times daily as directed by physician.  For investigational use only 480 each 2    leflunomide (ARAVA) 20 MG Tab Take 1 tablet (20 mg total) by mouth once daily. 90 tablet 0    methotrexate 25 mg/mL injection INJECT 0.3ML (7.5MG) SUBCUTANEOUSLY ONCE A WEEK - DISCARD 28 DAYS AFTER INITIAL USE 2 mL 0    montelukast (SINGULAIR) 10 mg tablet Take 1 tablet (10 mg total) by mouth nightly. 90 tablet 3    naproxen (NAPROSYN) 500 MG tablet Take 1 tablet (500 mg total) by mouth 2 (two) times daily as needed. 180 tablet 1    omeprazole (PRILOSEC) 40 MG capsule TAKE ONE CAPSULE BY MOUTH EVERY MORNING 30 capsule 6    omeprazole-sodium bicarbonate (ZEGERID) 40-1.1 mg-gram per capsule Take 1 capsule by mouth every morning. 90 capsule 3    POTASSIUM ORAL Take by mouth once daily.      predniSONE (DELTASONE) 1 MG tablet Take 4 tablets (4 mg total) by mouth once daily. 120 tablet 1    predniSONE (DELTASONE) 5 MG tablet Take 1 tablet (5 mg total) by mouth once daily. 90 tablet 1    predniSONE (DELTASONE) 5 MG tablet Take 1 tablet (5 mg total) by mouth once daily. 30 tablet 1    PREMARIN 0.625 mg tablet Take 0.625 mg by mouth once daily.  11    progesterone (PROMETRIUM) 100 MG capsule Take 100 mg by mouth every morning. 1 Capsule Oral Every day, morning      RESTASIS 0.05 % ophthalmic emulsion       rizatriptan (MAXALT) 10 MG tablet Take 10 mg by mouth as needed for  "Migraine.       rosuvastatin (CRESTOR) 20 MG tablet TAKE 1 TABLET EVERY DAY 90 tablet 3    sucralfate (CARAFATE) 1 gram tablet Take 1 tablet (1 g total) by mouth 4 (four) times daily. 360 tablet 3    syringe with needle (TUBERCULIN SYRINGE) 1 mL 27 x 1/2" Syrg To administer methotrexate 7.5mg sc once a week 12 Syringe 3    tofacitinib (XELJANZ XR) 11 mg Tb24 Take 11 mg by mouth once daily. 90 tablet 0    tramadol (ULTRAM) 50 mg tablet TAKE 1 TABLET DAILY AS NEEDED PAIN 30 tablet 0    valACYclovir (VALTREX) 1000 MG tablet TAKE 1 TABLET BY MOUTH TWICE A DAY AS NEEDED 20 tablet 3    VESICARE 10 mg tablet Take 1 tablet by mouth every morning.        No current facility-administered medications on file prior to visit.            Review of patient's allergies indicates:   Allergen Reactions    Actemra [tocilizumab] Other (See Comments)     Severe dizziness    Codeine Nausea And Vomiting    Gold au 198 Hives and Rash    Hydroxychloroquine Other (See Comments)     Can't remember the reaction      Iodinated contrast media - oral and iv dye Other (See Comments)     Other reaction(s): BURNING ALL OVER    Iodine Other (See Comments)     Other reaction(s): BURNING ALL OVER - Iodine dye - Not topical    Sulfa (sulfonamide antibiotics) Other (See Comments)     Can't remember the reaction    Zofran [ondansetron hcl (pf)] Nausea And Vomiting     Pt reports that last time she received zofran she started vomiting again    Methotrexate analogues Nausea Only    Pneumovax 23 [pneumococcal 23-argenis ps vaccine] Other (See Comments)     "sick"           Social History     Social History    Marital status:      Spouse name: N/A    Number of children: N/A    Years of education: N/A     Occupational History    Not on file.     Social History Main Topics    Smoking status: Never Smoker    Smokeless tobacco: Never Used    Alcohol use Yes      Comment: occasionally    Drug use: No    Sexual activity: Yes     " "Partners: Male     Birth control/ protection: Surgical     Other Topics Concern    Not on file     Social History Narrative    No narrative on file           ROS:  General ROS: negative for  chills, fatigue or fever  Cardiovascular ROS: no chest pain or dyspnea on exertion  Musculoskeletal ROS: negative for joint pain or joint stiffness.  Negative for loss of strength.  Positive for foot pain.   Neuro ROS: Negative for syncope, numbness, or muscle weakness  Skin ROS: Negative for rash, itching or nail/hair changes.           OBJECTIVE:         Vitals:    04/24/18 1336   Weight: 68 kg (150 lb)   Height: 5' 1" (1.549 m)        Right Lower extremity exam:  Vasc: Palpable pedal pulses. Feet appropriately warm to touch. Cap refill time is within normal limits   Neurological:  Light touch, proprioception, and Sharp/dull sensation are all intact. There is no Tinel's along the tarsal tunnel.    Derm: No open lesions, macerations, or rashes.  MSK:  No tenderness to palpation near the 4th or 5th metatarsal shaft;  Very minimal swelling, no bruising or redness noted.  No increased temperature note.        Imagings:    7/27/17: XRAY  Polyarthritis consistent with RA similar to old exam. No acute abnormality.   8/7/17 MRI:  Stress fracture of the 3rd metatarsal.  Periarticular erosions involving the 1st interphalangeal joint and 4th and 5th MTP joints, findings corresponding to this patient's history of rheumatoid arthritis.  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  9/12/17  MRI:   Continued visualization of abnormal bone marrow signal involving the third metatarsal with a nondisplaced fracture involving the proximal metaphysis of the third metatarsal. There has been interval increased osseous callus when compared to previous MRI dated 8/7/17.  9/21/17 XRAY  3 views    Acute nondisplaced transverse fracture identified involving the fourth metatarsal bone.  Healing fracture of the third metatarsal " bone identified in satisfactory position and alignment.  DJD and inflammatory arthropathy again noted.  Pes planus.  10/3/17 xrays 3 views: There are fractures of the third and fourth metatarsals.  There is DJD of flatfoot deformity and cystic change of the fourth metatarsal head probably degenerative.  11/2/17: Comparison: 10/3/17      Findings:Unchanged alignment of the third and fourth metatarsal fractures when compared to most recent prior. There is mild (approximately 1/2 shaft width) displacement laterally of the fourth metatarsal fracture. Minimal interval callus formation when compared to most recent prior. Unchanged pes planus.  12/5/17:  3 views    Healing fracture of the third and fourth metatarsal bones remain in unchanged and satisfactory position as compared to the previous study.  Mild DJD, osteopenia and hallux valgus.  Pes planus.  1/4/18 3 views  Healing fracture of the third and fourth metatarsal bone remain unchanged position as compared to the previous study.  DJD.  2/15/18   Narrative     Right foot AP, lateral, and oblique views. Comparison 1/4/18. The mid shafts of the third and fourth metatarsals again show incompletely healed fractures with some callus present. Position and alignment are satisfactory. No new fracture is identified.    Periarticular erosions consistent with rheumatoid arthritis are again seen in multiple joints. Small bunion is present. No significant soft tissue swelling is seen.     3/5/18  MRI Impression    Chronic fractures midshaft RIGHT 3rd and 4th metatarsals with callus formation.  Osseous edema RIGHT 5th metatarsal, likely nondisplaced fracture at that level.     4/24/18  FINDINGS:      Fractures of the 3rd 4th and 5th metatarsals noted.  Interval fracture of the 5th metatarsal since the prior study.  Narrowing at the 1st MTP and multiple IP joints.            ASSESSMENT/PLAN:      I counseled the patient on her conditions, their implications and medical management.       Metatarsal fracture, pathologic, right, sequela  -     X-Ray Foot Complete Right; Future; Expected date: 04/24/2018 - Reviewed with the patient        · Continue rest.  May transition to athletic shoes.  Never walk barefoot.  Avoid thin sandals.  Monitor for worsening signs and symptoms.    · New Darco shoe dispensed in case she notices redness, swelling, or bruising.   · Continue bone stimulator    follow up in two months or sooner if concerned.

## 2018-04-25 ENCOUNTER — TELEPHONE (OUTPATIENT)
Dept: ENDOCRINOLOGY | Facility: CLINIC | Age: 58
End: 2018-04-25

## 2018-04-25 DIAGNOSIS — E04.1 THYROID NODULE: Primary | ICD-10-CM

## 2018-04-25 DIAGNOSIS — M06.9 RHEUMATOID ARTHRITIS, INVOLVING UNSPECIFIED SITE, UNSPECIFIED RHEUMATOID FACTOR PRESENCE: ICD-10-CM

## 2018-04-25 RX ORDER — LEFLUNOMIDE 20 MG/1
20 TABLET ORAL DAILY
Qty: 90 TABLET | Refills: 0 | Status: SHIPPED | OUTPATIENT
Start: 2018-04-25 | End: 2018-07-22 | Stop reason: SDUPTHER

## 2018-04-30 ENCOUNTER — HOSPITAL ENCOUNTER (OUTPATIENT)
Dept: CARDIOLOGY | Facility: CLINIC | Age: 58
Discharge: HOME | End: 2018-04-30
Payer: MEDICARE

## 2018-04-30 ENCOUNTER — OFFICE VISIT (OUTPATIENT)
Dept: GASTROENTEROLOGY | Facility: CLINIC | Age: 58
End: 2018-04-30
Payer: MEDICARE

## 2018-04-30 ENCOUNTER — RESEARCH ENCOUNTER (OUTPATIENT)
Dept: RESEARCH | Facility: HOSPITAL | Age: 58
End: 2018-04-30

## 2018-04-30 VITALS
DIASTOLIC BLOOD PRESSURE: 72 MMHG | BODY MASS INDEX: 28.84 KG/M2 | WEIGHT: 152.75 LBS | HEART RATE: 96 BPM | HEIGHT: 61 IN | SYSTOLIC BLOOD PRESSURE: 125 MMHG

## 2018-04-30 DIAGNOSIS — K31.84 GASTROPARESIS: Primary | ICD-10-CM

## 2018-04-30 DIAGNOSIS — Z00.6 RESEARCH STUDY PATIENT: ICD-10-CM

## 2018-04-30 DIAGNOSIS — K31.84 GASTROPARESIS: ICD-10-CM

## 2018-04-30 PROCEDURE — 93000 ELECTROCARDIOGRAM COMPLETE: CPT | Mod: S$GLB,,, | Performed by: INTERNAL MEDICINE

## 2018-04-30 PROCEDURE — 99999 PR PBB SHADOW E&M-EST. PATIENT-LVL V: CPT | Mod: PBBFAC,,, | Performed by: INTERNAL MEDICINE

## 2018-04-30 PROCEDURE — 99214 OFFICE O/P EST MOD 30 MIN: CPT | Mod: S$GLB,,, | Performed by: INTERNAL MEDICINE

## 2018-04-30 NOTE — PROGRESS NOTES
Subjective:       Patient ID: Joyce Peterson is a 57 y.o. female.    Chief Complaint: Follow-up    HPI  Review of Systems   Constitutional: Negative for activity change, appetite change, chills, diaphoresis, fatigue, fever and unexpected weight change.   HENT: Negative for congestion, ear pain, mouth sores, nosebleeds, postnasal drip, rhinorrhea, sinus pressure, sore throat, trouble swallowing and voice change.    Eyes: Negative for pain.   Respiratory: Negative for cough, shortness of breath and wheezing.    Cardiovascular: Negative for chest pain, palpitations and leg swelling.   Genitourinary: Negative for difficulty urinating, dysuria, flank pain, hematuria and menstrual problem.   Musculoskeletal: Positive for arthralgias. Negative for back pain, gait problem, joint swelling, myalgias and neck pain.   Skin: Negative for rash.   Neurological: Negative for dizziness, tremors, syncope, numbness and headaches.   Hematological: Negative for adenopathy. Does not bruise/bleed easily.   Psychiatric/Behavioral: Negative for agitation, behavioral problems, confusion, decreased concentration and dysphoric mood. The patient is not nervous/anxious.        Objective:      Physical Exam   Constitutional: She is oriented to person, place, and time. She appears well-developed and well-nourished. No distress.   HENT:   Head: Normocephalic and atraumatic.   Right Ear: External ear normal.   Left Ear: External ear normal.   Nose: Nose normal.   Mouth/Throat: Oropharynx is clear and moist. No oropharyngeal exudate.   Eyes: Conjunctivae are normal. Pupils are equal, round, and reactive to light. No scleral icterus.   Neck: Normal range of motion. Neck supple. No thyromegaly present.   Cardiovascular: Normal rate, regular rhythm, normal heart sounds and intact distal pulses.  Exam reveals no gallop.    No murmur heard.  Pulmonary/Chest: Effort normal and breath sounds normal. She has no wheezes. She has no rales.   Abdominal:  Soft. Bowel sounds are normal. She exhibits no shifting dullness, no distension, no fluid wave, no abdominal bruit and no mass. There is no hepatosplenomegaly. There is no tenderness.   Musculoskeletal: Normal range of motion. She exhibits no edema or tenderness.   Lymphadenopathy:     She has no cervical adenopathy.   Neurological: She is alert and oriented to person, place, and time. No cranial nerve deficit.   Skin: Skin is warm and dry. No rash noted.   Psychiatric: She has a normal mood and affect. Her behavior is normal. Judgment and thought content normal.       Assessment:       1. Gastroparesis    2. Research study patient        Plan:         HISTORY OF PRESENT ILLNESS:  This is a followup visit for Joyce.  She is on the   gastroparesis cisapride compassionate trial.  She is doing well from that   standpoint.  She gets occasional nausea and fullness after certain meals, but   for the most part, the cisapride continues to give her significant relief.  She   also gets some heartburn if she has rare symptoms.  Her bowel movements are   normal and formed.  She had a recent viral gastroenteritis.  This was manifested   as loose bowel movements, myalgias, headache and heartburn.  This lasted about   two or three days.  This started about four or five days ago.  She is now   returning to normal.    REVIEW OF SYSTEMS:  Negative for any pertinent symptoms related to the   cisapride, specifically negative for palpitations, syncope, chest pain or   shortness of breath.    ASSESSMENT:  1.  Gastroparesis.  We are going to continue the cisapride protocol.  I will   review the labs and EKG later today.  2.  Participation in research study.  For the study, I will fill out the   paperwork later on this evening and as noted review the labs and determine her   continued candidacy for being in this project.      ANDRES/AZIZA  dd: 04/30/2018 11:36:27 (CDT)  td: 04/30/2018 22:56:55 (CDT)  Doc ID   #[id number]  Job ID #[id number]    CC:

## 2018-05-01 NOTE — PROGRESS NOTES
CIS-[id number]: Limited Access Protocol for the Use of Oral Cisapride in the Treatment of Refractory Gastroesophageal Reflux Disease and Other Gastrointestinal Motility Disorders-   IRB #: [id number]    - OMKAR MOORE MD    SubjID:     Dr. Moore went over MRS Joyce Peterson's ECG and Labs during her clinic visit. He determined she was doing well and can continue in the study since she is still receiving a therapeutic benefit from Cisapride.    Study staff provided MRS Joyce Peterson with 6 bottles of 10mg Cisapride PO eight times a day.       Study staff at this visit was REGINO Sahu and Annabel Palm

## 2018-05-09 ENCOUNTER — PATIENT MESSAGE (OUTPATIENT)
Dept: RHEUMATOLOGY | Facility: CLINIC | Age: 58
End: 2018-05-09

## 2018-05-10 ENCOUNTER — PATIENT MESSAGE (OUTPATIENT)
Dept: RHEUMATOLOGY | Facility: CLINIC | Age: 58
End: 2018-05-10

## 2018-05-16 ENCOUNTER — LAB VISIT (OUTPATIENT)
Dept: LAB | Facility: HOSPITAL | Age: 58
End: 2018-05-16
Attending: INTERNAL MEDICINE
Payer: MEDICARE

## 2018-05-16 DIAGNOSIS — E05.90 SUBCLINICAL HYPERTHYROIDISM: ICD-10-CM

## 2018-05-16 DIAGNOSIS — Z79.631 METHOTREXATE, LONG TERM, CURRENT USE: ICD-10-CM

## 2018-05-16 DIAGNOSIS — M85.80 OSTEOPENIA, UNSPECIFIED LOCATION: ICD-10-CM

## 2018-05-16 DIAGNOSIS — M06.9 RHEUMATOID ARTHRITIS OF HAND, UNSPECIFIED LATERALITY, UNSPECIFIED RHEUMATOID FACTOR PRESENCE: ICD-10-CM

## 2018-05-16 DIAGNOSIS — R79.89 ELEVATED PARATHYROID HORMONE: ICD-10-CM

## 2018-05-16 DIAGNOSIS — E04.1 THYROID NODULE: ICD-10-CM

## 2018-05-16 LAB
ALBUMIN SERPL BCP-MCNC: 3.8 G/DL
ALBUMIN SERPL BCP-MCNC: 3.8 G/DL
ALP SERPL-CCNC: 87 U/L
ALT SERPL W/O P-5'-P-CCNC: 72 U/L
ANION GAP SERPL CALC-SCNC: 9 MMOL/L
ANION GAP SERPL CALC-SCNC: 9 MMOL/L
AST SERPL-CCNC: 71 U/L
BASOPHILS # BLD AUTO: 0.04 K/UL
BASOPHILS NFR BLD: 0.6 %
BILIRUB SERPL-MCNC: 0.4 MG/DL
BUN SERPL-MCNC: 16 MG/DL
BUN SERPL-MCNC: 16 MG/DL
CALCIUM SERPL-MCNC: 9 MG/DL
CALCIUM SERPL-MCNC: 9 MG/DL
CHLORIDE SERPL-SCNC: 107 MMOL/L
CHLORIDE SERPL-SCNC: 107 MMOL/L
CO2 SERPL-SCNC: 22 MMOL/L
CO2 SERPL-SCNC: 22 MMOL/L
CREAT SERPL-MCNC: 0.8 MG/DL
CREAT SERPL-MCNC: 0.8 MG/DL
CRP SERPL-MCNC: 0.4 MG/L
DIFFERENTIAL METHOD: ABNORMAL
EOSINOPHIL # BLD AUTO: 0 K/UL
EOSINOPHIL NFR BLD: 0 %
ERYTHROCYTE [DISTWIDTH] IN BLOOD BY AUTOMATED COUNT: 14.6 %
ERYTHROCYTE [SEDIMENTATION RATE] IN BLOOD BY WESTERGREN METHOD: 3 MM/HR
EST. GFR  (AFRICAN AMERICAN): >60 ML/MIN/1.73 M^2
EST. GFR  (AFRICAN AMERICAN): >60 ML/MIN/1.73 M^2
EST. GFR  (NON AFRICAN AMERICAN): >60 ML/MIN/1.73 M^2
EST. GFR  (NON AFRICAN AMERICAN): >60 ML/MIN/1.73 M^2
GLUCOSE SERPL-MCNC: 101 MG/DL
GLUCOSE SERPL-MCNC: 101 MG/DL
HCT VFR BLD AUTO: 40.3 %
HGB BLD-MCNC: 12.7 G/DL
IMM GRANULOCYTES # BLD AUTO: 0.03 K/UL
IMM GRANULOCYTES NFR BLD AUTO: 0.4 %
LYMPHOCYTES # BLD AUTO: 1 K/UL
LYMPHOCYTES NFR BLD: 13.6 %
MCH RBC QN AUTO: 30.6 PG
MCHC RBC AUTO-ENTMCNC: 31.5 G/DL
MCV RBC AUTO: 97 FL
MONOCYTES # BLD AUTO: 0.5 K/UL
MONOCYTES NFR BLD: 7.1 %
NEUTROPHILS # BLD AUTO: 5.5 K/UL
NEUTROPHILS NFR BLD: 78.3 %
NRBC BLD-RTO: 0 /100 WBC
PHOSPHATE SERPL-MCNC: 2.1 MG/DL
PLATELET # BLD AUTO: 307 K/UL
PMV BLD AUTO: 10.4 FL
POTASSIUM SERPL-SCNC: 4.1 MMOL/L
POTASSIUM SERPL-SCNC: 4.1 MMOL/L
PROT SERPL-MCNC: 6.5 G/DL
PTH-INTACT SERPL-MCNC: 114 PG/ML
RBC # BLD AUTO: 4.15 M/UL
SODIUM SERPL-SCNC: 138 MMOL/L
SODIUM SERPL-SCNC: 138 MMOL/L
T4 FREE SERPL-MCNC: 0.98 NG/DL
TSH SERPL DL<=0.005 MIU/L-ACNC: 0.29 UIU/ML
WBC # BLD AUTO: 7.05 K/UL

## 2018-05-16 PROCEDURE — 80053 COMPREHEN METABOLIC PANEL: CPT

## 2018-05-16 PROCEDURE — 84439 ASSAY OF FREE THYROXINE: CPT

## 2018-05-16 PROCEDURE — 83970 ASSAY OF PARATHORMONE: CPT

## 2018-05-16 PROCEDURE — 84443 ASSAY THYROID STIM HORMONE: CPT

## 2018-05-16 PROCEDURE — 80069 RENAL FUNCTION PANEL: CPT

## 2018-05-16 PROCEDURE — 36415 COLL VENOUS BLD VENIPUNCTURE: CPT | Mod: PO

## 2018-05-16 PROCEDURE — 85651 RBC SED RATE NONAUTOMATED: CPT

## 2018-05-16 PROCEDURE — 86140 C-REACTIVE PROTEIN: CPT

## 2018-05-16 PROCEDURE — 85025 COMPLETE CBC W/AUTO DIFF WBC: CPT

## 2018-05-21 ENCOUNTER — OFFICE VISIT (OUTPATIENT)
Dept: RHEUMATOLOGY | Facility: CLINIC | Age: 58
End: 2018-05-21
Payer: MEDICARE

## 2018-05-21 VITALS
SYSTOLIC BLOOD PRESSURE: 118 MMHG | BODY MASS INDEX: 28.89 KG/M2 | WEIGHT: 153 LBS | HEART RATE: 87 BPM | DIASTOLIC BLOOD PRESSURE: 85 MMHG | HEIGHT: 61 IN

## 2018-05-21 DIAGNOSIS — M85.80 OSTEOPENIA, UNSPECIFIED LOCATION: ICD-10-CM

## 2018-05-21 DIAGNOSIS — R74.01 ELEVATED TRANSAMINASE LEVEL: Primary | ICD-10-CM

## 2018-05-21 DIAGNOSIS — M35.01 SJOGREN'S SYNDROME WITH KERATOCONJUNCTIVITIS SICCA: ICD-10-CM

## 2018-05-21 DIAGNOSIS — M06.9 RHEUMATOID ARTHRITIS INVOLVING MULTIPLE SITES, UNSPECIFIED RHEUMATOID FACTOR PRESENCE: ICD-10-CM

## 2018-05-21 PROBLEM — M35.00 SJOGREN'S SYNDROME: Status: ACTIVE | Noted: 2018-05-21

## 2018-05-21 PROCEDURE — 96372 THER/PROPH/DIAG INJ SC/IM: CPT | Mod: S$GLB,,, | Performed by: INTERNAL MEDICINE

## 2018-05-21 PROCEDURE — 99214 OFFICE O/P EST MOD 30 MIN: CPT | Mod: 25,S$GLB,, | Performed by: INTERNAL MEDICINE

## 2018-05-21 PROCEDURE — 99999 PR PBB SHADOW E&M-EST. PATIENT-LVL V: CPT | Mod: PBBFAC,,, | Performed by: INTERNAL MEDICINE

## 2018-05-21 PROCEDURE — 3008F BODY MASS INDEX DOCD: CPT | Mod: CPTII,S$GLB,, | Performed by: INTERNAL MEDICINE

## 2018-05-21 RX ORDER — METHOTREXATE 25 MG/ML
5 INJECTION, SOLUTION INTRA-ARTERIAL; INTRAMUSCULAR; INTRAVENOUS
Qty: 2 ML | Refills: 0 | Status: SHIPPED | OUTPATIENT
Start: 2018-05-21 | End: 2018-08-21

## 2018-05-21 RX ORDER — PREDNISONE 1 MG/1
3 TABLET ORAL DAILY
Qty: 270 TABLET | Refills: 1 | Status: SHIPPED | OUTPATIENT
Start: 2018-05-21 | End: 2018-08-21

## 2018-05-21 RX ORDER — TRIAMCINOLONE ACETONIDE 40 MG/ML
40 INJECTION, SUSPENSION INTRA-ARTICULAR; INTRAMUSCULAR
Status: COMPLETED | OUTPATIENT
Start: 2018-05-21 | End: 2018-05-21

## 2018-05-21 RX ADMIN — TRIAMCINOLONE ACETONIDE 40 MG: 40 INJECTION, SUSPENSION INTRA-ARTICULAR; INTRAMUSCULAR at 12:05

## 2018-05-21 ASSESSMENT — DISEASE ACTIVITY SCORE (DAS28)
ESR_MM_PER_HR: 3
CRP_MG_PER_LITER: .4

## 2018-05-21 NOTE — PROGRESS NOTES
"Subjective:       Patient ID: Joyce Peterson is a 57 y.o. female.    Chief Complaint: RA  HPI notes abd pressure, slight nausea for 3 days after methotrexate 7.5mg sc once a week; but feels it helps the RA. Has tapered prednisone 5mg by 1mg weekly, off x  1wk. Notes gen increased joint pain since stopping prednisone.  Review of Systems   Constitutional: Positive for fatigue. Negative for appetite change, fever and unexpected weight change.   HENT: Negative for mouth sores.         Dry mouth   Eyes: Positive for redness. Negative for visual disturbance.        Dry   Respiratory: Negative for cough, shortness of breath and wheezing.    Cardiovascular: Negative for chest pain and palpitations.   Gastrointestinal: Negative for abdominal pain, anal bleeding, blood in stool, constipation, diarrhea, nausea and vomiting.   Genitourinary: Positive for dysuria. Negative for frequency and urgency.   Musculoskeletal: Negative for arthralgias, back pain, gait problem, joint swelling, myalgias, neck pain and neck stiffness.   Skin: Negative for rash.   Neurological: Negative for weakness, numbness and headaches.   Hematological: Negative for adenopathy. Does not bruise/bleed easily.   Psychiatric/Behavioral: Negative for sleep disturbance. The patient is not nervous/anxious.          Objective:   /85   Pulse 87   Ht 5' 1" (1.549 m)   Wt 69.4 kg (153 lb)   LMP  (LMP Unknown)   BMI 28.91 kg/m²      Physical Exam   Constitutional: She is oriented to person, place, and time and well-developed, well-nourished, and in no distress.   HENT:   Head: Normocephalic and atraumatic.   Mouth/Throat: Oropharynx is clear and moist.   Eyes: Conjunctivae and EOM are normal.   Neck: Normal range of motion. Neck supple. No thyromegaly present.   Cardiovascular: Normal rate, regular rhythm, normal heart sounds and intact distal pulses.  Exam reveals no gallop and no friction rub.    No murmur heard.  Pulmonary/Chest: Breath sounds " normal. She has no wheezes. She has no rales. She exhibits no tenderness.   Abdominal: Soft. She exhibits no distension and no mass. There is no tenderness.       Right Side Rheumatological Exam     Examination finds the shoulder normal.    The patient is tender to palpation of the elbow and wrist    She has swelling of the elbow, wrist and knee    The patient has an enlarged 1st PIP, 1st MCP, 2nd PIP, 2nd MCP, 3rd PIP, 3rd MCP, 4th PIP, 4th MCP, 5th PIP and 5th MCP    Left Side Rheumatological Exam     Examination finds the shoulder normal.    The patient is tender to palpation of the elbow and wrist.    She has swelling of the elbow and wrist    The patient has an enlarged knee, 1st PIP, 1st MCP, 2nd PIP, 2nd MCP, 3rd PIP, 3rd MCP, 4th PIP, 4th MCP, 5th PIP and 5th MCP.      Lymphadenopathy:     She has no cervical adenopathy.   Neurological: She is alert and oriented to person, place, and time. She displays normal reflexes. Gait normal.   Nl motor strength UE and LE bilateral   Musculoskeletal: She exhibits no edema.         Results for TONNY GOMEZ (MRN [id number]) as of 5/21/2018 11:31   Ref. Range 5/16/2018 12:22 5/16/2018 12:22   WBC Latest Ref Range: 3.90 - 12.70 K/uL 7.05    RBC Latest Ref Range: 4.00 - 5.40 M/uL 4.15    Hemoglobin Latest Ref Range: 12.0 - 16.0 g/dL 12.7    Hematocrit Latest Ref Range: 37.0 - 48.5 % 40.3    MCV Latest Ref Range: 82 - 98 fL 97    MCH Latest Ref Range: 27.0 - 31.0 pg 30.6    MCHC Latest Ref Range: 32.0 - 36.0 g/dL 31.5 (L)    RDW Latest Ref Range: 11.5 - 14.5 % 14.6 (H)    Platelets Latest Ref Range: 150 - 350 K/uL 307    MPV Latest Ref Range: 9.2 - 12.9 fL 10.4    Gran% Latest Ref Range: 38.0 - 73.0 % 78.3 (H)    Gran # (ANC) Latest Ref Range: 1.8 - 7.7 K/uL 5.5    Immature Granulocytes Latest Ref Range: 0.0 - 0.5 % 0.4    Immature Grans (Abs) Latest Ref Range: 0.00 - 0.04 K/uL 0.03    Lymph% Latest Ref Range: 18.0 - 48.0 % 13.6 (L)    Lymph # Latest Ref Range: 1.0 - 4.8  K/uL 1.0    Mono% Latest Ref Range: 4.0 - 15.0 % 7.1    Mono # Latest Ref Range: 0.3 - 1.0 K/uL 0.5    Eosinophil% Latest Ref Range: 0.0 - 8.0 % 0.0    Eos # Latest Ref Range: 0.0 - 0.5 K/uL 0.0    Basophil% Latest Ref Range: 0.0 - 1.9 % 0.6    Baso # Latest Ref Range: 0.00 - 0.20 K/uL 0.04    nRBC Latest Ref Range: 0 /100 WBC 0    Sed Rate Latest Ref Range: 0 - 20 mm/Hr 3    Sodium Latest Ref Range: 136 - 145 mmol/L 138 138   Potassium Latest Ref Range: 3.5 - 5.1 mmol/L 4.1 4.1   Chloride Latest Ref Range: 95 - 110 mmol/L 107 107   CO2 Latest Ref Range: 23 - 29 mmol/L 22 (L) 22 (L)   Anion Gap Latest Ref Range: 8 - 16 mmol/L 9 9   BUN, Bld Latest Ref Range: 6 - 20 mg/dL 16 16   Creatinine Latest Ref Range: 0.5 - 1.4 mg/dL 0.8 0.8   eGFR if non African American Latest Ref Range: >60 mL/min/1.73 m^2 >60.0 >60.0   eGFR if African American Latest Ref Range: >60 mL/min/1.73 m^2 >60.0 >60.0   Glucose Latest Ref Range: 70 - 110 mg/dL 101 101   Calcium Latest Ref Range: 8.7 - 10.5 mg/dL 9.0 9.0   Phosphorus Latest Ref Range: 2.7 - 4.5 mg/dL  2.1 (L)   Alkaline Phosphatase Latest Ref Range: 55 - 135 U/L 87    Total Protein Latest Ref Range: 6.0 - 8.4 g/dL 6.5    Albumin Latest Ref Range: 3.5 - 5.2 g/dL 3.8 3.8   Total Bilirubin Latest Ref Range: 0.1 - 1.0 mg/dL 0.4    AST Latest Ref Range: 10 - 40 U/L 71 (H)    ALT Latest Ref Range: 10 - 44 U/L 72 (H)    CRP Latest Ref Range: 0.0 - 8.2 mg/L 0.4    TSH Latest Ref Range: 0.400 - 4.000 uIU/mL 0.287 (L)    Free T4 Latest Ref Range: 0.71 - 1.51 ng/dL 0.98    PTH Latest Ref Range: 9.0 - 77.0 pg/mL 114.0 (H)      Assessment/Plan         Problem List Items Addressed This Visit     Osteopenia    Overview     Please tell pt the bone density shows osteopenia which should be treated given her current dose of prednisone. Please schedule brief f/u visit to discuss in next several weeks. Thanks. RJQ          PACS Images     Show images for DXA Bone Density Spine And Hip_Axial Skeleton    Reviewed By Asaf Moran MD on 2017 13:37   Patient Result Comments     Viewed by Joyce Peterson on 2017  4:35 PM   Written by Isiah Moran MD on 2017  1:37 PM   The bone density shows osteopenia which should be treated given your current dose of prednisone. Ronel will schedule brief visit to discuss therapy options for this. RJQ   External Result Report     External Result Report   Narrative     : 1960 ORDERING PHYSICIAN: JARON Moran LOCATION: [facility]    HISTORY: 57 y/o female with no hx of fractures. Pts Mother had a wrist fx. Pt is taking Calcium, Vit D, Estrogen and 7mg of Prednisone. She has a hx of Rheumatoid Arthritis and Asthma. She exercises 3 times a week and does not smoke.    TECHNIQUE: Bone Mineral Density performed using Hologic Horizon A (S/N [id number]) reveals good positioning of lumbar spine and hip.    BONE MINERAL DENSITY RESULTS:  Lumbar Spine: Lumbar bone mineral density L1-L4 is 0.819g/cm2, which is a t-score of -2.1. The z-score is -0.9.    Total Hip: The total hip bone mineral density is 0.868g/cm2.  The t-score is -0.6, and the z-score is 0.1.  Femoral neck BMD is 0.723g/cm2 and the t-score is -1.1.      COMPARISONS:  Date Location BMD T-score  12 L-spine 0.828 -2.0  Total Hip 0.923 -0.2   Impression      Low bone mass/osteopenia of the lumbar spine and femoral neck.  Decrease in hip BMD (-6%) since prior study. FRAX calculations do not suggest treatment.      Recommendations:  1) Adequate calcium and Vitamin D therapy  2) Appropriate exercise  3) Glucocorticoids may adversely affect quality and quantity of bone.    Consider bisphosphonate if patient requires prednisone 7.5 mg or greater for 3 months or more.  4) Consider repeat BMD in 1-2 years    EXPLANATION OF RESULTS:  The t-score compares this results to the bone density of a 25 year old of the same gender. The z-score compares this result to the average bone density to  people of the same age and gender. The amounts indicate the number of standard deviations above or below the mean.  * Osteoporosis is generally defined as having a t-score between less than -2.5.  * Osteopenia is generally defined as having a t-score between -1 and -2.5.  * The normal range is generally defined as having a t-score between -1 to 1.      Electronically signed by: VARUN LAURA MD  Date: 02/21/17        Results for TONNY GOMEZ (MRN [id number]) as of 5/21/2018 11:31   Ref. Range 8/11/2017 09:50   Vit D, 25-Hydroxy Latest Ref Range: 30 - 96 ng/mL 45            Current Assessment & Plan     denosumab 60mg sc 3/1/18, next 9/1/18         Rheumatoid arthritis involving multiple sites    Overview     erosive RA discontinued long term methotrexate b/o nausea with po and sc and reduced dose, tolerating leflunomide and tofacitinib.Has failed 3 TNFi(infliximab, etanercept, adalimumab), abatacept, hypogamma with rituximab, acute vertigo/dizziness with tocilizumab x2           Current Assessment & Plan     ESR 3  CRP 0.4 TJ 4 SJ 5  Global    DAS28    [id number]      *Kenalog 40mg IM  Cont tofacitinib-XR 11mg daily  Cont leflunomide 20mg daily   Decrease methotrexate to 5mg sc once a week with folic acid 1mg daily  Cont naproxen 500mg twice daily prn with Carafate and Prilosec  Resume prednisone 3mg daily  Arthritis survey  Repeat hepatic function 2 wks  RTC  3months with standing labs               Relevant Medications    methotrexate 25 mg/mL injection    Other Relevant Orders    XR Arthritis Survey    Sjogren's syndrome    Overview     Secondary Sjogren's now with punctal plugs and Restasis, seeing Jamila Armas             Other Visit Diagnoses     Elevated transaminase level    -  Primary    Relevant Orders    HEPATIC FUNCTION PANEL

## 2018-05-21 NOTE — ASSESSMENT & PLAN NOTE
ESR 3  CRP 0.4 TJ 4 SJ 5  Global    DAS28    [id number]      *Kenalog 40mg IM  Cont tofacitinib-XR 11mg daily  Cont leflunomide 20mg daily   Decrease methotrexate to 5mg sc once a week with folic acid 1mg daily  Cont naproxen 500mg twice daily prn with Carafate and Prilosec  Resume prednisone 3mg daily  Arthritis survey  Repeat hepatic function 2 wks  RTC  3months with standing labs

## 2018-05-22 RX ORDER — FLUCONAZOLE 100 MG/1
100 TABLET ORAL DAILY
Qty: 14 TABLET | Refills: 3 | Status: SHIPPED | OUTPATIENT
Start: 2018-05-22 | End: 2018-10-15 | Stop reason: SDUPTHER

## 2018-05-26 ENCOUNTER — PATIENT MESSAGE (OUTPATIENT)
Dept: ENDOCRINOLOGY | Facility: CLINIC | Age: 58
End: 2018-05-26

## 2018-05-26 ENCOUNTER — PATIENT MESSAGE (OUTPATIENT)
Dept: RHEUMATOLOGY | Facility: CLINIC | Age: 58
End: 2018-05-26

## 2018-05-27 ENCOUNTER — PATIENT MESSAGE (OUTPATIENT)
Dept: RHEUMATOLOGY | Facility: CLINIC | Age: 58
End: 2018-05-27

## 2018-05-27 ENCOUNTER — PATIENT MESSAGE (OUTPATIENT)
Dept: ENDOCRINOLOGY | Facility: CLINIC | Age: 58
End: 2018-05-27

## 2018-05-28 ENCOUNTER — TELEPHONE (OUTPATIENT)
Dept: RHEUMATOLOGY | Facility: CLINIC | Age: 58
End: 2018-05-28

## 2018-05-28 ENCOUNTER — HOSPITAL ENCOUNTER (OUTPATIENT)
Dept: RADIOLOGY | Facility: HOSPITAL | Age: 58
Discharge: HOME | End: 2018-05-28
Attending: INTERNAL MEDICINE
Payer: MEDICARE

## 2018-05-28 DIAGNOSIS — M06.9 RHEUMATOID ARTHRITIS INVOLVING MULTIPLE SITES, UNSPECIFIED RHEUMATOID FACTOR PRESENCE: ICD-10-CM

## 2018-05-28 DIAGNOSIS — R74.01 ELEVATED TRANSAMINASE LEVEL: Primary | ICD-10-CM

## 2018-05-28 DIAGNOSIS — R74.8 ABNORMAL GGT TEST: Primary | ICD-10-CM

## 2018-05-28 PROCEDURE — 77077 JOINT SURVEY SINGLE VIEW: CPT | Mod: 26,,, | Performed by: RADIOLOGY

## 2018-05-28 PROCEDURE — 77077 JOINT SURVEY SINGLE VIEW: CPT | Mod: TC

## 2018-05-29 ENCOUNTER — OFFICE VISIT (OUTPATIENT)
Dept: PODIATRY | Facility: CLINIC | Age: 58
End: 2018-05-29
Payer: MEDICARE

## 2018-05-29 VITALS — HEIGHT: 61 IN | BODY MASS INDEX: 28.89 KG/M2 | WEIGHT: 153 LBS

## 2018-05-29 DIAGNOSIS — M84.474S METATARSAL FRACTURE, PATHOLOGIC, RIGHT, SEQUELA: Primary | ICD-10-CM

## 2018-05-29 DIAGNOSIS — M06.9 RHEUMATOID ARTHRITIS INVOLVING MULTIPLE SITES, UNSPECIFIED RHEUMATOID FACTOR PRESENCE: ICD-10-CM

## 2018-05-29 PROCEDURE — 99213 OFFICE O/P EST LOW 20 MIN: CPT | Mod: S$GLB,,, | Performed by: PODIATRIST

## 2018-05-29 PROCEDURE — 3008F BODY MASS INDEX DOCD: CPT | Mod: CPTII,S$GLB,, | Performed by: PODIATRIST

## 2018-05-29 PROCEDURE — 99999 PR PBB SHADOW E&M-EST. PATIENT-LVL II: CPT | Mod: PBBFAC,,, | Performed by: PODIATRIST

## 2018-05-29 NOTE — PROGRESS NOTES
Chief Complaint   Patient presents with    Foot Problem     right foot/            HPI:       Joyce Peterson is a 57 y.o. female who presents to clinic for follow up right foot pain  She has history of Rheumatoid Arthritis and osteoporosis.   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   She just restarted Prednisone.    She complains of pain to the right dorsal midfoot.   No trauma.    Hasn't been using bone stimulator.         Past Medical History:   Diagnosis Date    Acid reflux     Allergy     Anemia     Asthma     Coronary artery disease     Degenerative disc disease     Dry eyes     Dry mouth     Gastroparesis     Hyperlipidemia     Lateral meniscus derangement 4/6/2016    Osteoarthritis     Osteoporosis     Rheumatoid arthritis(714.0)     Umbilical hernia 8/13/2015             Current Outpatient Prescriptions on File Prior to Visit   Medication Sig Dispense Refill    albuterol 90 mcg/actuation inhaler Inhale 2 puffs into the lungs every 6 (six) hours as needed for Wheezing. 18 g 11    aspirin 81 mg Tab Take 81 mg by mouth every morning.       azelastine (ASTELIN) 137 mcg nasal spray 1 spray (137 mcg total) by Nasal route 2 (two) times daily. (Patient taking differently: 1 spray by Nasal route 2 (two) times daily as needed. ) 30 mL 11    budesonide-formoterol 160-4.5 mcg (SYMBICORT) 160-4.5 mcg/actuation HFAA Inhale 2 puffs into the lungs every 12 (twelve) hours. 3 Inhaler 3    calcium citrate-vitamin D (CITRACAL + D) 315-200 mg-unit per tablet Take 1 tablet by mouth 2 (two) times daily.       CYCLOSPORINE (RESTASIS OPHT) Inject 0.05 % into the eye. 1 Dropperette Both eyes Twice a day .  dispense one month supply/sixty-four vials/ two trays      cyclosporine 0.05 % Drop Apply 1 drop to eye 2 (two) times daily. DISPENSE 3 MONTH SUPPLY 5.5 mL 11    fish oil-omega-3 fatty acids 300-1,000 mg capsule Take 1,400 g by mouth once daily.      flaxseed  oil 1,000 mg Cap Take by mouth once daily.      fluconazole (DIFLUCAN) 100 MG tablet Take 100 mg by mouth once daily.  3    fluconazole (DIFLUCAN) 100 MG tablet TAKE 1 TABLET (100 MG TOTAL) BY MOUTH ONCE DAILY. 14 tablet 3    fluticasone (FLONASE) 50 mcg/actuation nasal spray       folic acid (FOLVITE) 1 MG tablet Take 1 tablet (1 mg total) by mouth once daily. 90 tablet 1    GUAIFENESIN (MUCINEX ORAL) Take 400 mg by mouth every 4 (four) hours as needed.       INV PROPULSID 10 MG Take 20 mg by mouth 4 (four) times daily as directed by physician.  For investigational use only 480 each 2    INV PROPULSID 10 MG Take 10 mg by mouth 4 (four) times daily. 760 each 0    leflunomide (ARAVA) 20 MG Tab TAKE 1 TABLET (20 MG TOTAL) BY MOUTH ONCE DAILY. 90 tablet 0    methotrexate 25 mg/mL injection Inject 0.2 mLs (5 mg total) into the skin every 7 days. 2 mL 0    montelukast (SINGULAIR) 10 mg tablet Take 1 tablet (10 mg total) by mouth nightly. 90 tablet 3    naproxen (NAPROSYN) 500 MG tablet Take 1 tablet (500 mg total) by mouth 2 (two) times daily as needed. 180 tablet 1    omeprazole (PRILOSEC) 40 MG capsule TAKE ONE CAPSULE BY MOUTH EVERY MORNING 30 capsule 6    omeprazole-sodium bicarbonate (ZEGERID) 40-1.1 mg-gram per capsule Take 1 capsule by mouth every morning. 90 capsule 3    POTASSIUM ORAL Take by mouth once daily.      predniSONE (DELTASONE) 1 MG tablet Take 3 tablets (3 mg total) by mouth once daily. 270 tablet 1    PREMARIN 0.625 mg tablet Take 0.625 mg by mouth once daily.  11    progesterone (PROMETRIUM) 100 MG capsule Take 100 mg by mouth every morning. 1 Capsule Oral Every day, morning      RESTASIS 0.05 % ophthalmic emulsion       rizatriptan (MAXALT) 10 MG tablet Take 10 mg by mouth as needed for Migraine.       rosuvastatin (CRESTOR) 20 MG tablet TAKE 1 TABLET EVERY DAY 90 tablet 3    sucralfate (CARAFATE) 1 gram tablet Take 1 tablet (1 g total) by mouth 4 (four) times daily. 360 tablet  "3    syringe with needle (TUBERCULIN SYRINGE) 1 mL 27 x 1/2" Syrg To administer methotrexate 7.5mg sc once a week 12 Syringe 3    tofacitinib (XELJANZ XR) 11 mg Tb24 Take 11 mg by mouth once daily. 90 tablet 0    tramadol (ULTRAM) 50 mg tablet TAKE 1 TABLET DAILY AS NEEDED PAIN 30 tablet 0    valACYclovir (VALTREX) 1000 MG tablet TAKE 1 TABLET BY MOUTH TWICE A DAY AS NEEDED 20 tablet 3    VESICARE 10 mg tablet Take 1 tablet by mouth every morning.        No current facility-administered medications on file prior to visit.            Review of patient's allergies indicates:   Allergen Reactions    Actemra [tocilizumab] Other (See Comments)     Severe dizziness    Codeine Nausea And Vomiting    Gold au 198 Hives and Rash    Hydroxychloroquine Other (See Comments)     Can't remember the reaction      Iodinated contrast media - oral and iv dye Other (See Comments)     Other reaction(s): BURNING ALL OVER    Iodine Other (See Comments)     Other reaction(s): BURNING ALL OVER - Iodine dye - Not topical    Sulfa (sulfonamide antibiotics) Other (See Comments)     Can't remember the reaction    Zofran [ondansetron hcl (pf)] Nausea And Vomiting     Pt reports that last time she received zofran she started vomiting again    Methotrexate analogues Nausea Only    Pneumovax 23 [pneumococcal 23-argenis ps vaccine] Other (See Comments)     "sick"           Social History     Social History    Marital status:      Spouse name: N/A    Number of children: N/A    Years of education: N/A     Occupational History    Not on file.     Social History Main Topics    Smoking status: Never Smoker    Smokeless tobacco: Never Used    Alcohol use Yes      Comment: occasionally    Drug use: No    Sexual activity: Yes     Partners: Male     Birth control/ protection: Surgical     Other Topics Concern    Not on file     Social History Narrative    No narrative on file           ROS:  General ROS: negative for  chills, " "fatigue or fever  Cardiovascular ROS: no chest pain or dyspnea on exertion  Musculoskeletal ROS: negative for joint pain or joint stiffness.  Negative for loss of strength.  Positive for foot pain.   Neuro ROS: Negative for syncope, numbness, or muscle weakness  Skin ROS: Negative for rash, itching or nail/hair changes.           OBJECTIVE:         Vitals:    05/29/18 1506   Weight: 69.4 kg (153 lb)   Height: 5' 1" (1.549 m)        Right Lower extremity exam:  Vasc: Palpable pedal pulses. Feet appropriately warm to touch. Cap refill time is within normal limits   Neurological:  Light touch, proprioception, and Sharp/dull sensation are all intact. There is no Tinel's along the tarsal tunnel.    Derm: No open lesions, macerations, or rashes.  MSK:  No tenderness to palpation near the 4th or 5th metatarsal shaft;  Very minimal swelling, no bruising or redness noted.  No increased temperature note.        Imagings:    7/27/17: XRAY  Polyarthritis consistent with RA similar to old exam. No acute abnormality.   8/7/17 MRI:  Stress fracture of the 3rd metatarsal.  Periarticular erosions involving the 1st interphalangeal joint and 4th and 5th MTP joints, findings corresponding to this patient's history of rheumatoid arthritis.  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  9/12/17  MRI:   Continued visualization of abnormal bone marrow signal involving the third metatarsal with a nondisplaced fracture involving the proximal metaphysis of the third metatarsal. There has been interval increased osseous callus when compared to previous MRI dated 8/7/17.  9/21/17 XRAY  3 views    Acute nondisplaced transverse fracture identified involving the fourth metatarsal bone.  Healing fracture of the third metatarsal bone identified in satisfactory position and alignment.  DJD and inflammatory arthropathy again noted.  Pes planus.  10/3/17 xrays 3 views: There are fractures of the third and fourth metatarsals. "  There is DJD of flatfoot deformity and cystic change of the fourth metatarsal head probably degenerative.  11/2/17: Comparison: 10/3/17      Findings:Unchanged alignment of the third and fourth metatarsal fractures when compared to most recent prior. There is mild (approximately 1/2 shaft width) displacement laterally of the fourth metatarsal fracture. Minimal interval callus formation when compared to most recent prior. Unchanged pes planus.  12/5/17:  3 views    Healing fracture of the third and fourth metatarsal bones remain in unchanged and satisfactory position as compared to the previous study.  Mild DJD, osteopenia and hallux valgus.  Pes planus.  1/4/18 3 views  Healing fracture of the third and fourth metatarsal bone remain unchanged position as compared to the previous study.  DJD.  2/15/18   Narrative     Right foot AP, lateral, and oblique views. Comparison 1/4/18. The mid shafts of the third and fourth metatarsals again show incompletely healed fractures with some callus present. Position and alignment are satisfactory. No new fracture is identified.    Periarticular erosions consistent with rheumatoid arthritis are again seen in multiple joints. Small bunion is present. No significant soft tissue swelling is seen.     3/5/18  MRI Impression    Chronic fractures midshaft RIGHT 3rd and 4th metatarsals with callus formation.  Osseous edema RIGHT 5th metatarsal, likely nondisplaced fracture at that level.     4/24/18   FINDINGS:      Fractures of the 3rd 4th and 5th metatarsals noted.  Interval fracture of the 5th metatarsal since the prior study.  Narrowing at the 1st MTP and multiple IP joints.            ASSESSMENT/PLAN:      I counseled the patient on her conditions, their implications and medical management.      Metatarsal fracture, pathologic, right, sequela  -     MRI Foot (Midfoot) Right Without Contrast; Future; Expected date: 05/29/2018    Rheumatoid arthritis involving multiple sites,  unspecified rheumatoid factor presence  -     MRI Foot (Midfoot) Right Without Contrast; Future; Expected date: 05/29/2018         · Continue rest.  May transition to athletic shoes.  Never walk barefoot.  Avoid thin sandals.  Monitor for worsening signs and symptoms.    · Continue bone stimulator  · See orders above    Follow up in a month or sooner if concerned.

## 2018-06-04 ENCOUNTER — HOSPITAL ENCOUNTER (OUTPATIENT)
Dept: RADIOLOGY | Facility: OTHER | Age: 58
Discharge: HOME | End: 2018-06-04
Attending: PHYSICIAN ASSISTANT
Payer: MEDICARE

## 2018-06-04 ENCOUNTER — OFFICE VISIT (OUTPATIENT)
Dept: ORTHOPEDICS | Facility: CLINIC | Age: 58
End: 2018-06-04
Payer: MEDICARE

## 2018-06-04 VITALS
HEIGHT: 61 IN | SYSTOLIC BLOOD PRESSURE: 126 MMHG | WEIGHT: 153 LBS | HEART RATE: 102 BPM | DIASTOLIC BLOOD PRESSURE: 71 MMHG | BODY MASS INDEX: 28.89 KG/M2

## 2018-06-04 DIAGNOSIS — M25.522 LEFT ELBOW PAIN: Primary | ICD-10-CM

## 2018-06-04 DIAGNOSIS — M25.522 LEFT ELBOW PAIN: ICD-10-CM

## 2018-06-04 PROCEDURE — 3008F BODY MASS INDEX DOCD: CPT | Mod: CPTII,S$GLB,, | Performed by: PHYSICIAN ASSISTANT

## 2018-06-04 PROCEDURE — 29105 APPLICATION LONG ARM SPLINT: CPT | Mod: LT,S$GLB,, | Performed by: PHYSICIAN ASSISTANT

## 2018-06-04 PROCEDURE — 73080 X-RAY EXAM OF ELBOW: CPT | Mod: 26,LT,, | Performed by: RADIOLOGY

## 2018-06-04 PROCEDURE — 99214 OFFICE O/P EST MOD 30 MIN: CPT | Mod: 25,S$GLB,, | Performed by: PHYSICIAN ASSISTANT

## 2018-06-04 PROCEDURE — 73080 X-RAY EXAM OF ELBOW: CPT | Mod: TC,FY,LT

## 2018-06-04 PROCEDURE — 99999 PR PBB SHADOW E&M-EST. PATIENT-LVL III: CPT | Mod: PBBFAC,,, | Performed by: PHYSICIAN ASSISTANT

## 2018-06-04 RX ORDER — DIAZEPAM 5 MG/1
5 TABLET ORAL ONCE AS NEEDED
Qty: 2 TABLET | Refills: 0 | Status: SHIPPED | OUTPATIENT
Start: 2018-06-04 | End: 2018-06-04

## 2018-06-04 NOTE — PROGRESS NOTES
"Subjective:      Patient ID: Joyce Peterson is a 57 y.o. female.    Chief Complaint: Pain of the Left Elbow      HPI  Joyce Peterson is a 57 y.o. female presenting today for left elbow pain. There was a history of trauma. Pt was helping her  carry a heavy fan yesterday when she heard and felt a "pop" in the left elbow. She had immediate severe pain in the elbow. She initially also noted some numbness and tingling which has since resolved. She continues to have pain and swelling in the elbow. Pt notes hx of left tennis elbow which began about 2 mos ago. She did have elbow pain from this prior to injury however pain has greatly increased and there is new elbow swelling.           Review of patient's allergies indicates:   Allergen Reactions    Actemra [tocilizumab] Other (See Comments)     Severe dizziness    Codeine Nausea And Vomiting    Gold au 198 Hives and Rash    Hydroxychloroquine Other (See Comments)     Can't remember the reaction      Iodinated contrast- oral and iv dye Other (See Comments)     Other reaction(s): BURNING ALL OVER    Iodine Other (See Comments)     Other reaction(s): BURNING ALL OVER - Iodine dye - Not topical    Sulfa (sulfonamide antibiotics) Other (See Comments)     Can't remember the reaction    Zofran [ondansetron hcl (pf)] Nausea And Vomiting     Pt reports that last time she received zofran she started vomiting again    Methotrexate analogues Nausea Only    Pneumovax 23 [pneumococcal 23-argenis ps vaccine] Other (See Comments)     "sick"         Current Outpatient Prescriptions   Medication Sig Dispense Refill    albuterol 90 mcg/actuation inhaler Inhale 2 puffs into the lungs every 6 (six) hours as needed for Wheezing. 18 g 11    aspirin 81 mg Tab Take 81 mg by mouth every morning.       azelastine (ASTELIN) 137 mcg nasal spray 1 spray (137 mcg total) by Nasal route 2 (two) times daily. (Patient taking differently: 1 spray by Nasal route 2 (two) times daily as " needed. ) 30 mL 11    budesonide-formoterol 160-4.5 mcg (SYMBICORT) 160-4.5 mcg/actuation HFAA Inhale 2 puffs into the lungs every 12 (twelve) hours. 3 Inhaler 3    calcium citrate-vitamin D (CITRACAL + D) 315-200 mg-unit per tablet Take 1 tablet by mouth 2 (two) times daily.       CYCLOSPORINE (RESTASIS OPHT) Inject 0.05 % into the eye. 1 Dropperette Both eyes Twice a day .  dispense one month supply/sixty-four vials/ two trays      cyclosporine 0.05 % Drop Apply 1 drop to eye 2 (two) times daily. DISPENSE 3 MONTH SUPPLY 5.5 mL 11    fish oil-omega-3 fatty acids 300-1,000 mg capsule Take 1,400 g by mouth once daily.      flaxseed oil 1,000 mg Cap Take by mouth once daily.      fluconazole (DIFLUCAN) 100 MG tablet Take 100 mg by mouth once daily.  3    fluconazole (DIFLUCAN) 100 MG tablet TAKE 1 TABLET (100 MG TOTAL) BY MOUTH ONCE DAILY. 14 tablet 3    fluticasone (FLONASE) 50 mcg/actuation nasal spray       folic acid (FOLVITE) 1 MG tablet Take 1 tablet (1 mg total) by mouth once daily. 90 tablet 1    GUAIFENESIN (MUCINEX ORAL) Take 400 mg by mouth every 4 (four) hours as needed.       INV PROPULSID 10 MG Take 20 mg by mouth 4 (four) times daily as directed by physician.  For investigational use only 480 each 2    INV PROPULSID 10 MG Take 10 mg by mouth 4 (four) times daily. 760 each 0    leflunomide (ARAVA) 20 MG Tab TAKE 1 TABLET (20 MG TOTAL) BY MOUTH ONCE DAILY. 90 tablet 0    methotrexate 25 mg/mL injection Inject 0.2 mLs (5 mg total) into the skin every 7 days. 2 mL 0    montelukast (SINGULAIR) 10 mg tablet Take 1 tablet (10 mg total) by mouth nightly. 90 tablet 3    naproxen (NAPROSYN) 500 MG tablet Take 1 tablet (500 mg total) by mouth 2 (two) times daily as needed. 180 tablet 1    omeprazole (PRILOSEC) 40 MG capsule TAKE ONE CAPSULE BY MOUTH EVERY MORNING 30 capsule 6    omeprazole-sodium bicarbonate (ZEGERID) 40-1.1 mg-gram per capsule Take 1 capsule by mouth every morning. 90 capsule 3  "   POTASSIUM ORAL Take by mouth once daily.      predniSONE (DELTASONE) 1 MG tablet Take 3 tablets (3 mg total) by mouth once daily. 270 tablet 1    PREMARIN 0.625 mg tablet Take 0.625 mg by mouth once daily.  11    progesterone (PROMETRIUM) 100 MG capsule Take 100 mg by mouth every morning. 1 Capsule Oral Every day, morning      RESTASIS 0.05 % ophthalmic emulsion       rizatriptan (MAXALT) 10 MG tablet Take 10 mg by mouth as needed for Migraine.       rosuvastatin (CRESTOR) 20 MG tablet TAKE 1 TABLET EVERY DAY 90 tablet 3    sucralfate (CARAFATE) 1 gram tablet Take 1 tablet (1 g total) by mouth 4 (four) times daily. 360 tablet 3    syringe with needle (TUBERCULIN SYRINGE) 1 mL 27 x 1/2" Syrg To administer methotrexate 7.5mg sc once a week 12 Syringe 3    tofacitinib (XELJANZ XR) 11 mg Tb24 Take 11 mg by mouth once daily. 90 tablet 0    tramadol (ULTRAM) 50 mg tablet TAKE 1 TABLET DAILY AS NEEDED PAIN 30 tablet 0    valACYclovir (VALTREX) 1000 MG tablet TAKE 1 TABLET BY MOUTH TWICE A DAY AS NEEDED 20 tablet 3    VESICARE 10 mg tablet Take 1 tablet by mouth every morning.        No current facility-administered medications for this visit.        Past Medical History:   Diagnosis Date    Acid reflux     Allergy     Anemia     Asthma     Coronary artery disease     Degenerative disc disease     Dry eyes     Dry mouth     Gastroparesis     Hyperlipidemia     Lateral meniscus derangement 4/6/2016    Osteoarthritis     Osteoporosis     Rheumatoid arthritis(714.0)     Umbilical hernia 8/13/2015       Past Surgical History:   Procedure Laterality Date    BREAST BIOPSY Left 01/29/2002    core bx    CARPAL TUNNEL RELEASE Right 05/2017    CHOLECYSTECTOMY  2004    COLONOSCOPY      10/11    COLONOSCOPY N/A 6/29/2017    Procedure: COLONOSCOPY;  Surgeon: López Moore MD;  Location: [facility] ([address] [place] [address]);  Service: Endoscopy;  Laterality: N/A;    TONSILLECTOMY      TUBAL LIGATION  2003    UPPER " "GASTROINTESTINAL ENDOSCOPY      10/11    uterine ablation  2003       Review of Systems:  Constitutional: Negative for chills and fever.   Respiratory: Negative for cough and shortness of breath.    Gastrointestinal: Negative for nausea and vomiting.   Skin: Negative for rash.   Neurological: Negative for dizziness and headaches.   Psychiatric/Behavioral: Negative for depression.   MSK as in HPI       OBJECTIVE:     PHYSICAL EXAM:  /71 (BP Location: Left arm, Patient Position: Sitting, BP Method: Small (Automatic))   Pulse 102   Ht 5' 1" (1.549 m)   Wt 69.4 kg (153 lb)   LMP  (LMP Unknown)   BMI 28.91 kg/m²     GEN:  NAD, well-developed, well-groomed.  NEURO: Awake, alert, and oriented. Normal attention and concentration.    PSYCH: Normal mood and affect. Behavior is normal.  HEENT: No cervical lymphadenopathy noted.  CARDIOVASCULAR: Radial pulses 2+ bilaterally. No LE edema noted.  PULMONARY: Breath sounds normal. No respiratory distress.  SKIN: Intact, no rashes.      MSK:   LUE:  Good active ROM of the wrist and fingers. Good elbow motion. She has pain with full flexion. there is swelling over the elbow. She is ttp over the elbow joint. AIN/PIN/Radial/Median/Ulnar Nerves assessed in isolation without deficit. Radial & Ulnar arteries palpated 2+. Capillary Refill <3s.      RADIOGRAPHS:  Xray left elbow 6/4/18  FINDINGS:  No acute fracture.  Degenerative osteophyte spurring is present the radial head.  There is mild soft tissue prominence overlying the olecranon consistent with olecranon bursitis.  No large joint effusion.    Comments: I have personally reviewed the imaging and I agree with the above radiologist's report.    ASSESSMENT/PLAN:       ICD-10-CM ICD-9-CM   1. Left elbow pain M25.522 719.42       Orders Placed This Encounter    MRI Elbow Joint Without Contrast Left      Plan:   -MRI left elbow   -orthoglass/ ACE applied   -Valium for claustrophobia given, prior to MRI prescribed by JYOTHI" ANNETTE Noriega  -RTC for MRI results         The patient indicates understanding of these issues and agrees to the plan.    Meagan Stinson PA-C  [facility]   [facility] [place]

## 2018-06-06 ENCOUNTER — HOSPITAL ENCOUNTER (OUTPATIENT)
Dept: RADIOLOGY | Facility: HOSPITAL | Age: 58
Discharge: HOME | End: 2018-06-06
Attending: PHYSICIAN ASSISTANT
Payer: MEDICARE

## 2018-06-06 DIAGNOSIS — M25.522 LEFT ELBOW PAIN: ICD-10-CM

## 2018-06-06 PROCEDURE — 73221 MRI JOINT UPR EXTREM W/O DYE: CPT | Mod: TC,LT

## 2018-06-06 PROCEDURE — 73221 MRI JOINT UPR EXTREM W/O DYE: CPT | Mod: 26,LT,, | Performed by: RADIOLOGY

## 2018-06-08 ENCOUNTER — TELEPHONE (OUTPATIENT)
Dept: ORTHOPEDICS | Facility: CLINIC | Age: 58
End: 2018-06-08

## 2018-06-08 NOTE — TELEPHONE ENCOUNTER
Spoke w/pt in regards to message below. Notified pt per Sandra not urgent for sooner appt and pt must see LS. Pt verbalized understanding and appt added to wait list for sooner with LS.     Pt states she saw the results [facility] her [facility] and wants a PA to call her to further discuss what to do with the brace, possible sling, and if she needs therapy.     Meagan to call patient back.

## 2018-06-08 NOTE — TELEPHONE ENCOUNTER
----- Message from Monica Agustin sent at 6/8/2018 10:37 AM CDT -----  Contact: TONNY GOMEZ [[id number]]            Name of Who is Calling: TONNY GOMEZ [[id number]]      What is the request in detail: pt would like a call back regarding MRI results. Please call     Can the clinic reply by [facility]: no    [facility] [phone]

## 2018-06-08 NOTE — TELEPHONE ENCOUNTER
"----- Message from Consuelo Bobo sent at 6/8/2018 11:01 AM CDT -----  Contact: TONNY GOMEZ [[id number]]      Name of Who is Calling: TONNY GOMEZ [[id number]]      What is the request in detail:  Patient called and, "said she's returning your call and would like you to please call again."  Also says, "she would like to be seen sooner than Tuesday 06/19/2018."  I did attempt to reschedule for a sooner appointment but was unsuccessful. Please give a call back at your earliest convenience.    THANKS!      Can the clinic reply by [facility]: No      What Number to Call Back:  TONNY GOMEZ  /  # [phone]                                    "

## 2018-06-11 ENCOUNTER — HOSPITAL ENCOUNTER (OUTPATIENT)
Dept: RADIOLOGY | Facility: HOSPITAL | Age: 58
Discharge: HOME | End: 2018-06-11
Attending: PODIATRIST
Payer: MEDICARE

## 2018-06-11 ENCOUNTER — LAB VISIT (OUTPATIENT)
Dept: LAB | Facility: HOSPITAL | Age: 58
End: 2018-06-11
Attending: INTERNAL MEDICINE
Payer: MEDICARE

## 2018-06-11 ENCOUNTER — PATIENT MESSAGE (OUTPATIENT)
Dept: RHEUMATOLOGY | Facility: CLINIC | Age: 58
End: 2018-06-11

## 2018-06-11 DIAGNOSIS — M06.9 RHEUMATOID ARTHRITIS INVOLVING MULTIPLE SITES, UNSPECIFIED RHEUMATOID FACTOR PRESENCE: ICD-10-CM

## 2018-06-11 DIAGNOSIS — M84.474S METATARSAL FRACTURE, PATHOLOGIC, RIGHT, SEQUELA: ICD-10-CM

## 2018-06-11 DIAGNOSIS — R74.8 ABNORMAL GGT TEST: ICD-10-CM

## 2018-06-11 LAB
ALBUMIN SERPL BCP-MCNC: 3.7 G/DL
ALP SERPL-CCNC: 69 U/L
ALT SERPL W/O P-5'-P-CCNC: 26 U/L
AST SERPL-CCNC: 30 U/L
BILIRUB DIRECT SERPL-MCNC: 0.2 MG/DL
BILIRUB SERPL-MCNC: 0.4 MG/DL
GGT SERPL-CCNC: 99 U/L
PROT SERPL-MCNC: 6.5 G/DL

## 2018-06-11 PROCEDURE — 80076 HEPATIC FUNCTION PANEL: CPT

## 2018-06-11 PROCEDURE — 82977 ASSAY OF GGT: CPT

## 2018-06-11 PROCEDURE — 73718 MRI LOWER EXTREMITY W/O DYE: CPT | Mod: 26,RT,, | Performed by: RADIOLOGY

## 2018-06-11 PROCEDURE — 73718 MRI LOWER EXTREMITY W/O DYE: CPT | Mod: TC,RT

## 2018-06-11 PROCEDURE — 36415 COLL VENOUS BLD VENIPUNCTURE: CPT | Mod: PO

## 2018-06-12 ENCOUNTER — TELEPHONE (OUTPATIENT)
Dept: RHEUMATOLOGY | Facility: CLINIC | Age: 58
End: 2018-06-12

## 2018-06-12 ENCOUNTER — TELEPHONE (OUTPATIENT)
Dept: ORTHOPEDICS | Facility: CLINIC | Age: 58
End: 2018-06-12

## 2018-06-12 DIAGNOSIS — R74.8 ABNORMAL GGT TEST: Primary | ICD-10-CM

## 2018-06-12 DIAGNOSIS — S59.902A INJURY OF LEFT ELBOW, INITIAL ENCOUNTER: Primary | ICD-10-CM

## 2018-06-12 NOTE — TELEPHONE ENCOUNTER
Left voicemail informing pt that OT orders have been placed and Dr Yarbrough stated that she could come in to switch over from splint to hinged elbow brace

## 2018-06-12 NOTE — TELEPHONE ENCOUNTER
Spoke w/pt in regards to message below. Pt states she has seen the results [facility]. I notified pt that per Sandra she needs to keep her f/u appt as scheduled with Dr Yarbrough. I explained to pt I cannot give pt results or discuss treatment with her over the phone that it is done in office.  Patient states she would like a call back from a PA to discuss the MRI.

## 2018-06-12 NOTE — TELEPHONE ENCOUNTER
Spoke to patient - scheduled OT for first available in [place] (6/20). Will call to get put on wait list. We are out of Left hinged elbow braces here at [facility]. I will call her when I get brace in my hand

## 2018-06-12 NOTE — TELEPHONE ENCOUNTER
----- Message from Jim Moore MA sent at 6/12/2018  3:21 PM CDT -----  Contact: pt      ----- Message -----  From: Kalli Daily  Sent: 6/12/2018   3:20 PM  To: Milad Rhodes Staff              Name of Who is Calling: TONNY GOMEZ [[id number]]      What is the request in detail: pt returning call... Please advise      Can the clinic reply by [facility]: no      [facility]: [phone]

## 2018-06-12 NOTE — TELEPHONE ENCOUNTER
----- Message from Jim Moore MA sent at 6/12/2018 10:14 AM CDT -----  Contact: TONNY GOMEZ [[id number]]      ----- Message -----  From: Ani Rodriguez  Sent: 6/12/2018  10:09 AM  To: Linnea HILTON Staff              Name of Who is Calling:TONNY GOMEZ [[id number]]      What is the request in detail:pt would like to discuss with staff in regards to her MRI       Can the clinic reply by [facility]: no      What Number to Call Back if not in [facility]: [phone]

## 2018-06-13 ENCOUNTER — TELEPHONE (OUTPATIENT)
Dept: ORTHOPEDICS | Facility: CLINIC | Age: 58
End: 2018-06-13

## 2018-06-13 ENCOUNTER — DOCUMENTATION ONLY (OUTPATIENT)
Dept: ORTHOPEDICS | Facility: CLINIC | Age: 58
End: 2018-06-13

## 2018-06-13 NOTE — TELEPHONE ENCOUNTER
----- Message from Fabian Anderson sent at 6/13/2018  3:07 PM CDT -----  Contact: Joyce Peterson            Name of Who is Calling: Joyce Peterson      What is the request in detail: Patient called in regards to elbow brace. Patient will not be able to come in till tomorrow. Patient requesting that elbow brace be put on hold      Can the clinic reply by [facility]: No      What Number to Call Back if not in [facility]: [phone]

## 2018-06-19 ENCOUNTER — OFFICE VISIT (OUTPATIENT)
Dept: ORTHOPEDICS | Facility: CLINIC | Age: 58
End: 2018-06-19
Payer: MEDICARE

## 2018-06-19 VITALS
HEIGHT: 61 IN | SYSTOLIC BLOOD PRESSURE: 117 MMHG | HEART RATE: 97 BPM | WEIGHT: 153 LBS | BODY MASS INDEX: 28.89 KG/M2 | DIASTOLIC BLOOD PRESSURE: 72 MMHG | RESPIRATION RATE: 18 BRPM

## 2018-06-19 DIAGNOSIS — M77.12 LATERAL EPICONDYLITIS OF LEFT ELBOW: Primary | ICD-10-CM

## 2018-06-19 PROCEDURE — 3008F BODY MASS INDEX DOCD: CPT | Mod: CPTII,S$GLB,, | Performed by: ORTHOPAEDIC SURGERY

## 2018-06-19 PROCEDURE — 99213 OFFICE O/P EST LOW 20 MIN: CPT | Mod: S$GLB,,, | Performed by: ORTHOPAEDIC SURGERY

## 2018-06-19 PROCEDURE — 99999 PR PBB SHADOW E&M-EST. PATIENT-LVL V: CPT | Mod: PBBFAC,,, | Performed by: ORTHOPAEDIC SURGERY

## 2018-06-19 NOTE — PROGRESS NOTES
"Subjective:      Patient ID: Joyce Peterson is a 57 y.o. female.    Chief Complaint: Pain of the Left Elbow      HPI  Joyce Peterson is a 57 y.o. female presenting today for MRI follow of L elbow. Per history patient was helping her  carry a heavy fan when she heard and felt a "pop" in the left elbow 3 weeks ago . She had immediate severe pain in the elbow.Pt notes hx of left tennis elbow which began about 2 mos ago. She did have elbow pain from this prior to injury however pain has greatly increased and there is new elbow swelling.       She has been in a hinged elbow brace at 70 (unable to tolerate 90) for several weeks. She does not wear this at night. Reports pain and stiffness worsening with brace.         Review of patient's allergies indicates:   Allergen Reactions    Actemra [tocilizumab] Other (See Comments)     Severe dizziness    Codeine Nausea And Vomiting    Gold au 198 Hives and Rash    Hydroxychloroquine Other (See Comments)     Can't remember the reaction      Iodinated contrast- oral and iv dye Other (See Comments)     Other reaction(s): BURNING ALL OVER    Iodine Other (See Comments)     Other reaction(s): BURNING ALL OVER - Iodine dye - Not topical    Sulfa (sulfonamide antibiotics) Other (See Comments)     Can't remember the reaction    Zofran [ondansetron hcl (pf)] Nausea And Vomiting     Pt reports that last time she received zofran she started vomiting again    Methotrexate analogues Nausea Only    Pneumovax 23 [pneumococcal 23-argenis ps vaccine] Other (See Comments)     "sick"         Current Outpatient Prescriptions   Medication Sig Dispense Refill    albuterol 90 mcg/actuation inhaler Inhale 2 puffs into the lungs every 6 (six) hours as needed for Wheezing. 18 g 11    aspirin 81 mg Tab Take 81 mg by mouth every morning.       azelastine (ASTELIN) 137 mcg nasal spray 1 spray (137 mcg total) by Nasal route 2 (two) times daily. (Patient taking differently: 1 spray by " Nasal route 2 (two) times daily as needed. ) 30 mL 11    budesonide-formoterol 160-4.5 mcg (SYMBICORT) 160-4.5 mcg/actuation HFAA Inhale 2 puffs into the lungs every 12 (twelve) hours. 3 Inhaler 3    calcium citrate-vitamin D (CITRACAL + D) 315-200 mg-unit per tablet Take 1 tablet by mouth 2 (two) times daily.       CYCLOSPORINE (RESTASIS OPHT) Inject 0.05 % into the eye. 1 Dropperette Both eyes Twice a day .  dispense one month supply/sixty-four vials/ two trays      cyclosporine 0.05 % Drop Apply 1 drop to eye 2 (two) times daily. DISPENSE 3 MONTH SUPPLY 5.5 mL 11    fish oil-omega-3 fatty acids 300-1,000 mg capsule Take 1,400 g by mouth once daily.      flaxseed oil 1,000 mg Cap Take by mouth once daily.      fluconazole (DIFLUCAN) 100 MG tablet Take 100 mg by mouth once daily.  3    fluticasone (FLONASE) 50 mcg/actuation nasal spray       folic acid (FOLVITE) 1 MG tablet Take 1 tablet (1 mg total) by mouth once daily. 90 tablet 1    GUAIFENESIN (MUCINEX ORAL) Take 400 mg by mouth every 4 (four) hours as needed.       INV PROPULSID 10 MG Take 20 mg by mouth 4 (four) times daily as directed by physician.  For investigational use only 480 each 2    INV PROPULSID 10 MG Take 10 mg by mouth 4 (four) times daily. 760 each 0    leflunomide (ARAVA) 20 MG Tab TAKE 1 TABLET (20 MG TOTAL) BY MOUTH ONCE DAILY. 90 tablet 0    methotrexate 25 mg/mL injection Inject 0.2 mLs (5 mg total) into the skin every 7 days. 2 mL 0    montelukast (SINGULAIR) 10 mg tablet Take 1 tablet (10 mg total) by mouth nightly. 90 tablet 3    naproxen (NAPROSYN) 500 MG tablet Take 1 tablet (500 mg total) by mouth 2 (two) times daily as needed. 180 tablet 1    omeprazole (PRILOSEC) 40 MG capsule TAKE ONE CAPSULE BY MOUTH EVERY MORNING 30 capsule 6    omeprazole-sodium bicarbonate (ZEGERID) 40-1.1 mg-gram per capsule Take 1 capsule by mouth every morning. 90 capsule 3    POTASSIUM ORAL Take by mouth once daily.      predniSONE  "(DELTASONE) 1 MG tablet Take 3 tablets (3 mg total) by mouth once daily. 270 tablet 1    PREMARIN 0.625 mg tablet Take 0.625 mg by mouth once daily.  11    progesterone (PROMETRIUM) 100 MG capsule Take 100 mg by mouth every morning. 1 Capsule Oral Every day, morning      RESTASIS 0.05 % ophthalmic emulsion       rizatriptan (MAXALT) 10 MG tablet Take 10 mg by mouth as needed for Migraine.       rosuvastatin (CRESTOR) 20 MG tablet TAKE 1 TABLET EVERY DAY 90 tablet 3    sucralfate (CARAFATE) 1 gram tablet Take 1 tablet (1 g total) by mouth 4 (four) times daily. 360 tablet 3    syringe with needle (TUBERCULIN SYRINGE) 1 mL 27 x 1/2" Syrg To administer methotrexate 7.5mg sc once a week 12 Syringe 3    tofacitinib (XELJANZ XR) 11 mg Tb24 Take 11 mg by mouth once daily. 90 tablet 0    tramadol (ULTRAM) 50 mg tablet TAKE 1 TABLET DAILY AS NEEDED PAIN 30 tablet 0    valACYclovir (VALTREX) 1000 MG tablet TAKE 1 TABLET BY MOUTH TWICE A DAY AS NEEDED 20 tablet 3    VESICARE 10 mg tablet Take 1 tablet by mouth every morning.       diazePAM (VALIUM) 5 MG tablet Take 1 tablet (5 mg total) by mouth once as needed for Anxiety (Take one tablet after checking into MRI, approximately 30 minutes prior to procedure, second if needed at time of procedure). 2 tablet 0     No current facility-administered medications for this visit.        Past Medical History:   Diagnosis Date    Acid reflux     Allergy     Anemia     Asthma     Coronary artery disease     Degenerative disc disease     Dry eyes     Dry mouth     Gastroparesis     Hyperlipidemia     Lateral meniscus derangement 4/6/2016    Osteoarthritis     Osteoporosis     Rheumatoid arthritis(714.0)     Umbilical hernia 8/13/2015       Past Surgical History:   Procedure Laterality Date    BREAST BIOPSY Left 01/29/2002    core bx    CARPAL TUNNEL RELEASE Right 05/2017    CHOLECYSTECTOMY  2004    COLONOSCOPY      10/11    COLONOSCOPY N/A 6/29/2017    " "Procedure: COLONOSCOPY;  Surgeon: López Moore MD;  Location: [facility] (4TH FLR);  Service: Endoscopy;  Laterality: N/A;    TONSILLECTOMY      TUBAL LIGATION  2003    UPPER GASTROINTESTINAL ENDOSCOPY      10/11    uterine ablation  2003       Review of Systems:  Constitutional: Negative for chills and fever.   Respiratory: Negative for cough and shortness of breath.    Gastrointestinal: Negative for nausea and vomiting.   Skin: Negative for rash.   Neurological: Negative for dizziness and headaches.   Psychiatric/Behavioral: Negative for depression.   MSK as in HPI       OBJECTIVE:     PHYSICAL EXAM:  /72 (BP Location: Right arm, Patient Position: Sitting, BP Method: Small (Automatic))   Pulse 97   Resp 18   Ht 5' 1" (1.549 m)   Wt 69.4 kg (153 lb)   LMP  (LMP Unknown)   BMI 28.91 kg/m²     GEN:  NAD, well-developed, well-groomed.  NEURO: Awake, alert, and oriented. Normal attention and concentration.    PSYCH: Normal mood and affect. Behavior is normal.  HEENT: No cervical lymphadenopathy noted.  CARDIOVASCULAR: Radial pulses 2+ bilaterally. No LE edema noted.  PULMONARY: Breath sounds normal. No respiratory distress.  SKIN: Intact, no rashes.      MSK:   LUE:  Good active ROM of the wrist and fingers. Good elbow motion  TTP lateral epicondyle and olecranon  No varus instability noted on exam  + pain with resisted wrist extension  AIN/PIN/Radial/Median/Ulnar Nerves assessed in isolation without deficit.  Radial & Ulnar arteries palpated 2+.   Capillary Refill <3s.      RADIOGRAPHS:  Xray left elbow 6/4/18  FINDINGS:  No acute fracture.  Degenerative osteophyte spurring is present the radial head.  There is mild soft tissue prominence overlying the olecranon consistent with olecranon bursitis.  No large joint effusion.    Comments: I have personally reviewed the imaging and I agree with the above radiologist's report.    MRI: partial tear of ECRB tendon, RCL tear    ASSESSMENT/PLAN:       " ICD-10-CM ICD-9-CM   1. Lateral epicondylitis of left elbow M77.12 726.32           Plan:   -Pain seems to be 2/2 lateral epicondylitis rather than acute RCL tear  - d/c hinged elbow brace  - tennis elbow strap  - begin OT

## 2018-06-19 NOTE — PROGRESS NOTES
I have personally taken the history and examined this patient. I agree with the resident's note as stated above. MRI reviewed with pt. Pt is point tender over lateral epicondyle. No pain over triceps, no lack of extension. Plan for therapy. RTC 7 weeks

## 2018-06-20 ENCOUNTER — CLINICAL SUPPORT (OUTPATIENT)
Dept: REHABILITATION | Facility: HOSPITAL | Age: 58
End: 2018-06-20
Payer: MEDICARE

## 2018-06-20 DIAGNOSIS — M25.522 LEFT ELBOW PAIN: ICD-10-CM

## 2018-06-20 PROCEDURE — 97110 THERAPEUTIC EXERCISES: CPT | Mod: PN

## 2018-06-20 PROCEDURE — 97165 OT EVAL LOW COMPLEX 30 MIN: CPT | Mod: PN

## 2018-06-20 PROCEDURE — G8984 CARRY CURRENT STATUS: HCPCS | Mod: CK,PN

## 2018-06-20 PROCEDURE — G8985 CARRY GOAL STATUS: HCPCS | Mod: CJ,PN

## 2018-06-20 NOTE — PATIENT INSTRUCTIONS
[facility], OCCUPATIONAL THERAPY  HOME EXERCISE PROGRAM     Complete the following exercises with 10 repetitions each, 2-3x/day.     AROM: Elbow Flexion / Extension          Bend and straighten elbow in 3 different positions: thumb up, palm up, palm down.      AROM: Supination / Pronation   With your elbow by your side, turn your palm up then turn your palm down.    Copyright © I. All rights reserved.     5 minute deep tissue massage (with golf ball) along back of forearm. Massage in the direction of the muscle and across muscle.     Therapist: SREEDHAR Mendez

## 2018-06-20 NOTE — PLAN OF CARE
[facility] Occupational Therapy  Initial Evaluation     Name: Joyce Peterson  Clinic Number: [id number]    Medical Diagnosis: M77.12 (ICD-10-CM) - Lateral epicondylitis of left elbow  S59.902A (ICD-10-CM) - Injury of left elbow, initial encounter  Partial tear of ECRB tendon, Grade II sprain of RCL, triceps partial tear/tendinosis, Degenerative osteophyte spurring at the radial head, olecranon bursitis, Cartilage loss/ osteoarthritis at the radial capitellar joint  Date of Onset: 2 months ago for lateral epicondylitis; 3 weeks ago for elbow injury  Date of Surgery: N/A  Surgical Procedure: N/A  Therapy Diagnosis:   Encounter Diagnosis   Name Primary?    Left elbow pain      Precautions: Standard    Physician: Meagan Stinson PA-C  Physician Orders: 2x/wk x 6 wks; Eval and treat, ROM (A/AA/P), modalities ok prn, HEP, pain control prior to strengthening  Date of Return to MD: unknown    Evaluation Date: 6/20/2018  Plan of Care Certification Period: 6/20/2018 - 8/1/2018    Visit #: 1/ Visits authorized: 50  Insurance Authorization period Expiration: 12/31/2018    Time In: 2:35  Time Out: 3:35  Total Billable Time: 60 minutes  Charges for this Visit: 1 eval, 1 TE  Total Cost to Date: oop $3000. Met $1,097.64. Once met, covered 100%    Subjective     Involved Side: Left  Dominant Side: Right   Imaging: MRI showing Lateral epicondylitis of left elbow, Partial tear of ECRB tendon, Grade II sprain of RCL, triceps partial tear/tendinosis, Degenerative osteophyte spurring at the radial head, olecranon bursitis, and Cartilage loss/ osteoarthritis at the radial capitellar joint  Mechanism of Injury: lifting a fan  History of Current Condition: Lateral epicondylitis has been occasionally flaring up for years.  Previous Therapy: Previous OT for RA in hands and for tennis elbow in 2017    Past Medical History/Physical Systems Review:   Past Medical History:   Diagnosis Date    Acid reflux     Allergy      Anemia     Asthma     Coronary artery disease     Degenerative disc disease     Dry eyes     Dry mouth     Gastroparesis     Hyperlipidemia     Lateral meniscus derangement 4/6/2016    Osteoarthritis     Osteoporosis     Rheumatoid arthritis(714.0)     Umbilical hernia 8/13/2015     Joyce Peterson  has a past surgical history that includes Cholecystectomy (2004); Tonsillectomy; Tubal ligation (2003); uterine ablation (2003); Upper gastrointestinal endoscopy; Colonoscopy; Carpal tunnel release (Right, 05/2017); Breast biopsy (Left, 01/29/2002); and Colonoscopy (N/A, 6/29/2017).    Joyce has a current medication list which includes the following prescription(s): albuterol, aspirin, azelastine, budesonide-formoterol 160-4.5 mcg, calcium citrate-vitamin d3 315-200 mg, cyclosporine, cyclosporine, diazepam, fish oil-omega-3 fatty acids, flaxseed oil, fluconazole, fluticasone, folic acid, guaifenesin, INV PROPULSID 10 MG, INV PROPULSID 10 MG, leflunomide, methotrexate, montelukast, naproxen, omeprazole, omeprazole-sodium bicarbonate, potassium, prednisone, premarin, progesterone, restasis, rizatriptan, rosuvastatin, sucralfate, syringe with needle, tofacitinib, tramadol, valacyclovir, and vesicare.    Review of patient's allergies indicates:   Allergen Reactions    Actemra [tocilizumab] Other (See Comments)     Severe dizziness    Codeine Nausea And Vomiting    Gold au 198 Hives and Rash    Hydroxychloroquine Other (See Comments)     Can't remember the reaction      Iodinated contrast- oral and iv dye Other (See Comments)     Other reaction(s): BURNING ALL OVER    Iodine Other (See Comments)     Other reaction(s): BURNING ALL OVER - Iodine dye - Not topical    Sulfa (sulfonamide antibiotics) Other (See Comments)     Can't remember the reaction    Zofran [ondansetron hcl (pf)] Nausea And Vomiting     Pt reports that last time she received zofran she started vomiting again    Methotrexate analogues  "Nausea Only    Pneumovax 23 [pneumococcal 23-argenis ps vaccine] Other (See Comments)     "sick"      Pain:  Functional Pain Scale Rating 0-10:   4/10 at current   4/10 at best   10/10 at worst   Location: slightly distal to lateral epicondyle and over olecranon process.  Description: Aching and Dull  Aggravating Factors: Touching, Night Time, Extension, Flexing and Lifting  Easing Factors: massage and heating pad     Patient's Goals for Therapy: See Assessment chart below.    Occupation:  See Assessment chart below.     Functional Limitations/Social History: See Assessment chart below.     Objective     Observation/Appearance: Left lateral elbow and olecranon process appear slightly swollen.    Edema. Measured in centimeters.   6/20/2018 6/20/2018      Left Right     2in. Above elbow 23.5 24.8     2in. Below elbow 22.5 22.5     Over elbow 24.0 24.0       Elbow and Wrist ROM. Measured in degrees.   6/20/2018 6/20/2018      Left Right     Elbow Ext/Flex 2/151 0/153     Supination/Pronation 60/90 55/90     Wrist Ext/Flex WNL WNL     Wrist RD/UD WNL WNL        Strength (Dyanmometer) and Pinch Strength (Pinch Gauge)  Measured in pounds.   6/20/2018 6/20/2018          Left Right     Rung II defer defer     Key Pinch       3pt Pinch       2pt Pinch         Sensation- Pt denies numbness and tingling.    CMS Impairment/Limitation/Restriction for FOTO Survey  Therapist reviewed FOTO scores for Joyce RAMU Peterson on 6/20/2018.   FOTO documents entered into [employer] - see Media section.    Limitation Score: 57%  Category: Carrying    Current : CK = at least 40% but < 60% impaired, limited or restricted  Goal: CJ = at least 20% but < 40% impaired, limited or restricted  Discharge: TBD     Treatment     Treatment Time In (separate from total time): 3:05 pm  Treatment Time Out (separate from total time: 3:35 pm  Total Treatment Time: 30 min    MHP applied to left elbow x 15 minutes for increased circulation and increased tissue " elasticity prior to therapeutic exercises/activities.    Deep friction massage to dorsal FA and lateral epicondyle region x 5 min for increased blood flow and decreased pain.     Pt then performed AROM elbow flexion/extension and supination/pronation x 10 reps each.    Issued HEP and educated on modality use for pain management (see patient instructions). Pt verbally understood the instructions as they were given and demonstrated proper form and technique during therapy. Pt was advised to perform these exercises free of pain, and to stop performing them if pain occurs.    Patient/Family Education: role of OT, goals for OT, scheduling/cancellations - pt verbalized understanding. Discussed insurance limitations with patient.    Assessment     Joyce Peterson is a 57 y.o. female referred to outpatient occupational/hand therapy and presents with a medical diagnosis of  Left lateral epicondylitis and left RCL Grade II sprain and demonstrates limitations as described in the chart below. Following brief medical record review it is determined that pt will benefit from occupational therapy services in order to maximize pain free and/or functional use of left UE. The following goals were discussed with the patient and patient is in agreement with them as to be addressed in the treatment plan. The patient's rehab potential is Excellent.     Anticipated barriers to occupational therapy: none  Pt has no cultural, educational or language barriers to learning provided.    Profile and History Assessment of Occupational Performance Level of Clinical Decision Making Complexity Score   Occupational Profile:   Joyce Peterson is a 57 y.o. female who lives with their spouse     Joyce Peterson is currently not working.    Joyce Peterson has difficulty with  feeding, bathing, grooming and dressing  phone/computer use and housework/household chores  affecting his/her daily functional abilities. His/her main goal for therapy  is decreased pain.     Previous functional status: Independent with all ADLs.     Comorbidities:   See PMH and Physical Systems Review Section above.    Medical and Therapy History Review:   Brief               Performance Deficits    Physical:  Muscle Power/Strength  Muscle Endurance  Edema  Pain    Cognitive:  No Deficits    Psychosocial:    No Deficits     Clinical Decision Making:  low    Assessment Process:  Problem-Focused Assessments    Modification/Need for Assistance:  Not Necessary    Intervention Selection:  Limited Treatment Options       low  Based on PMHX, co morbidities , data from assessments and functional level of assistance required with task and clinical presentation directly impacting function.       Goals     Long Term Goals (LTGs); to be met by discharge.  LTG #1: Pt will report a pain level of 0 out of 10 at rest.  LTG #2: Pt will demo improved FOTO score by 19 points.   LTG #3: Pt will return to prior level of function for ADLs and household management.     Short Term Goals (STGs); to be met within 4 weeks (7/18/2018).  STG #1a: Pt will report 2 out of 10 pain level at rest.  STG #2a: Pt will report/demo [place] with normal hair routine frequency and technique.  STG #2b: PT will report/demo moderate difficulty with carrying a shopping bag or briefcase.  STG #2a: Pt will demonstrate independence with issued HEP.   STG #3b: Pt will demo improved left hand  strength equal to at least 80% of right dominant hand  strength.    Plan     Pt to be treated by Occupational Therapy 2 times per week for 6 weeks during the certification period from 6/20/2018 to 8/1/2018 to achieve the established goals.     Treatment to include: Paraffin, Fluidotherapy, Manual therapy/joint mobilizations, Modalities for pain management, US 3 mhz, Therapeutic exercises/activities., Strengthening, Orthotic Fabrication/Fit/Training, Edema Control, Joint Protection and Energy Conservation, as well as any other  treatments deemed necessary based on the patient's needs or progress.     Therapist: SREEDHAR Mendez     I certify the need for these services furnished under this plan of treatment and while under my care    ____________________________________                         __________________  Physician/Referring Practitioner                                               Date of Signature

## 2018-06-22 ENCOUNTER — TELEPHONE (OUTPATIENT)
Dept: ENDOCRINOLOGY | Facility: CLINIC | Age: 58
End: 2018-06-22

## 2018-06-22 DIAGNOSIS — E21.3 HYPERPARATHYROIDISM: Primary | ICD-10-CM

## 2018-06-22 DIAGNOSIS — Z12.39 SCREENING BREAST EXAMINATION: Primary | ICD-10-CM

## 2018-06-22 DIAGNOSIS — E05.80 OTHER THYROTOXICOSIS WITHOUT THYROTOXIC CRISIS OR STORM: ICD-10-CM

## 2018-06-22 RX ORDER — PREDNISONE 1 MG/1
3 TABLET ORAL DAILY
Qty: 90 TABLET | Refills: 1 | Status: SHIPPED | OUTPATIENT
Start: 2018-06-22 | End: 2018-07-09

## 2018-06-22 NOTE — TELEPHONE ENCOUNTER
----- Message from Brigida Lehman sent at 6/22/2018 10:23 AM CDT -----  Contact: self   pls add orders for labs that is scheduled for 8/7

## 2018-06-26 ENCOUNTER — CLINICAL SUPPORT (OUTPATIENT)
Dept: REHABILITATION | Facility: HOSPITAL | Age: 58
End: 2018-06-26
Payer: MEDICARE

## 2018-06-26 ENCOUNTER — OFFICE VISIT (OUTPATIENT)
Dept: PODIATRY | Facility: CLINIC | Age: 58
End: 2018-06-26
Payer: MEDICARE

## 2018-06-26 VITALS — HEIGHT: 61 IN | BODY MASS INDEX: 28.89 KG/M2 | WEIGHT: 153 LBS

## 2018-06-26 DIAGNOSIS — M21.42 PES PLANUS OF BOTH FEET: ICD-10-CM

## 2018-06-26 DIAGNOSIS — M21.41 PES PLANUS OF BOTH FEET: ICD-10-CM

## 2018-06-26 DIAGNOSIS — M25.522 LEFT ELBOW PAIN: ICD-10-CM

## 2018-06-26 DIAGNOSIS — M06.9 RHEUMATOID ARTHRITIS INVOLVING MULTIPLE SITES, UNSPECIFIED RHEUMATOID FACTOR PRESENCE: ICD-10-CM

## 2018-06-26 DIAGNOSIS — M84.474S METATARSAL FRACTURE, PATHOLOGIC, RIGHT, SEQUELA: Primary | ICD-10-CM

## 2018-06-26 PROCEDURE — 99214 OFFICE O/P EST MOD 30 MIN: CPT | Mod: S$GLB,,, | Performed by: PODIATRIST

## 2018-06-26 PROCEDURE — 97035 APP MDLTY 1+ULTRASOUND EA 15: CPT | Mod: PN

## 2018-06-26 PROCEDURE — 97110 THERAPEUTIC EXERCISES: CPT | Mod: PN

## 2018-06-26 PROCEDURE — 97140 MANUAL THERAPY 1/> REGIONS: CPT | Mod: PN

## 2018-06-26 PROCEDURE — 3008F BODY MASS INDEX DOCD: CPT | Mod: CPTII,S$GLB,, | Performed by: PODIATRIST

## 2018-06-26 PROCEDURE — 99999 PR PBB SHADOW E&M-EST. PATIENT-LVL II: CPT | Mod: PBBFAC,,, | Performed by: PODIATRIST

## 2018-06-26 NOTE — PROGRESS NOTES
Chief Complaint   Patient presents with    Follow-up          HPI:       Joyce Peterson is a 57 y.o. female who presents to clinic for follow up right foot pain  She has history of Rheumatoid Arthritis and osteoporosis.   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   She is on Prednisone.    She complains of pain to the right dorsal midfoot.  Improving a bit.  No trauma.   Has been using bone stimulator.    She would like to discuss recent foot MRI findings.         Past Medical History:   Diagnosis Date    Acid reflux     Allergy     Anemia     Asthma     Coronary artery disease     Degenerative disc disease     Dry eyes     Dry mouth     Gastroparesis     Hyperlipidemia     Lateral meniscus derangement 4/6/2016    Osteoarthritis     Osteoporosis     Rheumatoid arthritis(714.0)     Umbilical hernia 8/13/2015             Current Outpatient Prescriptions on File Prior to Visit   Medication Sig Dispense Refill    albuterol 90 mcg/actuation inhaler Inhale 2 puffs into the lungs every 6 (six) hours as needed for Wheezing. 18 g 11    aspirin 81 mg Tab Take 81 mg by mouth every morning.       azelastine (ASTELIN) 137 mcg nasal spray 1 spray (137 mcg total) by Nasal route 2 (two) times daily. (Patient taking differently: 1 spray by Nasal route 2 (two) times daily as needed. ) 30 mL 11    budesonide-formoterol 160-4.5 mcg (SYMBICORT) 160-4.5 mcg/actuation HFAA Inhale 2 puffs into the lungs every 12 (twelve) hours. 3 Inhaler 3    calcium citrate-vitamin D (CITRACAL + D) 315-200 mg-unit per tablet Take 1 tablet by mouth 2 (two) times daily.       CYCLOSPORINE (RESTASIS OPHT) Inject 0.05 % into the eye. 1 Dropperette Both eyes Twice a day .  dispense one month supply/sixty-four vials/ two trays      cyclosporine 0.05 % Drop Apply 1 drop to eye 2 (two) times daily. DISPENSE 3 MONTH SUPPLY 5.5 mL 11    fish oil-omega-3 fatty acids 300-1,000 mg capsule Take 1,400  g by mouth once daily.      flaxseed oil 1,000 mg Cap Take by mouth once daily.      fluconazole (DIFLUCAN) 100 MG tablet Take 100 mg by mouth once daily.  3    fluticasone (FLONASE) 50 mcg/actuation nasal spray       folic acid (FOLVITE) 1 MG tablet Take 1 tablet (1 mg total) by mouth once daily. 90 tablet 1    GUAIFENESIN (MUCINEX ORAL) Take 400 mg by mouth every 4 (four) hours as needed.       INV PROPULSID 10 MG Take 20 mg by mouth 4 (four) times daily as directed by physician.  For investigational use only 480 each 2    INV PROPULSID 10 MG Take 10 mg by mouth 4 (four) times daily. 760 each 0    leflunomide (ARAVA) 20 MG Tab TAKE 1 TABLET (20 MG TOTAL) BY MOUTH ONCE DAILY. 90 tablet 0    methotrexate 25 mg/mL injection Inject 0.2 mLs (5 mg total) into the skin every 7 days. 2 mL 0    montelukast (SINGULAIR) 10 mg tablet Take 1 tablet (10 mg total) by mouth nightly. 90 tablet 3    naproxen (NAPROSYN) 500 MG tablet Take 1 tablet (500 mg total) by mouth 2 (two) times daily as needed. 180 tablet 1    omeprazole (PRILOSEC) 40 MG capsule TAKE ONE CAPSULE BY MOUTH EVERY MORNING 30 capsule 6    omeprazole-sodium bicarbonate (ZEGERID) 40-1.1 mg-gram per capsule Take 1 capsule by mouth every morning. 90 capsule 3    POTASSIUM ORAL Take by mouth once daily.      predniSONE (DELTASONE) 1 MG tablet Take 3 tablets (3 mg total) by mouth once daily. 270 tablet 1    predniSONE (DELTASONE) 1 MG tablet Take 3 tablets (3 mg total) by mouth once daily. 90 tablet 1    PREMARIN 0.625 mg tablet Take 0.625 mg by mouth once daily.  11    progesterone (PROMETRIUM) 100 MG capsule Take 100 mg by mouth every morning. 1 Capsule Oral Every day, morning      RESTASIS 0.05 % ophthalmic emulsion       rizatriptan (MAXALT) 10 MG tablet Take 10 mg by mouth as needed for Migraine.       rosuvastatin (CRESTOR) 20 MG tablet TAKE 1 TABLET EVERY DAY 90 tablet 3    sucralfate (CARAFATE) 1 gram tablet Take 1 tablet (1 g total) by  "mouth 4 (four) times daily. 360 tablet 3    syringe with needle (TUBERCULIN SYRINGE) 1 mL 27 x 1/2" Syrg To administer methotrexate 7.5mg sc once a week 12 Syringe 3    tofacitinib (XELJANZ XR) 11 mg Tb24 Take 11 mg by mouth once daily. 90 tablet 0    tramadol (ULTRAM) 50 mg tablet TAKE 1 TABLET DAILY AS NEEDED PAIN 30 tablet 0    valACYclovir (VALTREX) 1000 MG tablet TAKE 1 TABLET BY MOUTH TWICE A DAY AS NEEDED 20 tablet 3    VESICARE 10 mg tablet Take 1 tablet by mouth every morning.       diazePAM (VALIUM) 5 MG tablet Take 1 tablet (5 mg total) by mouth once as needed for Anxiety (Take one tablet after checking into MRI, approximately 30 minutes prior to procedure, second if needed at time of procedure). 2 tablet 0     No current facility-administered medications on file prior to visit.            Review of patient's allergies indicates:   Allergen Reactions    Actemra [tocilizumab] Other (See Comments)     Severe dizziness    Codeine Nausea And Vomiting    Gold au 198 Hives and Rash    Hydroxychloroquine Other (See Comments)     Can't remember the reaction      Iodinated contrast media - oral and iv dye Other (See Comments)     Other reaction(s): BURNING ALL OVER    Iodine Other (See Comments)     Other reaction(s): BURNING ALL OVER - Iodine dye - Not topical    Sulfa (sulfonamide antibiotics) Other (See Comments)     Can't remember the reaction    Zofran [ondansetron hcl (pf)] Nausea And Vomiting     Pt reports that last time she received zofran she started vomiting again    Methotrexate analogues Nausea Only    Pneumovax 23 [pneumococcal 23-argenis ps vaccine] Other (See Comments)     "sick"           Social History     Social History    Marital status:      Spouse name: N/A    Number of children: N/A    Years of education: N/A     Occupational History    Not on file.     Social History Main Topics    Smoking status: Never Smoker    Smokeless tobacco: Never Used    Alcohol use Yes     " " Comment: occasionally    Drug use: No    Sexual activity: Yes     Partners: Male     Birth control/ protection: Surgical     Other Topics Concern    Not on file     Social History Narrative    No narrative on file           ROS:  General ROS: negative for  chills, fatigue or fever  Cardiovascular ROS: no chest pain or dyspnea on exertion  Musculoskeletal ROS: negative for joint pain or joint stiffness.  Negative for loss of strength.  Positive for foot pain.   Neuro ROS: Negative for syncope, numbness, or muscle weakness  Skin ROS: Negative for rash, itching or nail/hair changes.           OBJECTIVE:         Vitals:    06/26/18 1306   Weight: 69.4 kg (153 lb)   Height: 5' 1" (1.549 m)        Right Lower extremity exam:  Vasc: Palpable pedal pulses. Feet appropriately warm to touch. Cap refill time is within normal limits   Neurological:  Light touch, proprioception, and Sharp/dull sensation are all intact. There is no Tinel's along the tarsal tunnel.    Derm: No open lesions, macerations, or rashes.  MSK:  No tenderness to palpation near the 4th or 5th metatarsal shaft;  Very minimal swelling, no bruising or redness noted.  No increased temperature note.        Imagings:    7/27/17: XRAY  Polyarthritis consistent with RA similar to old exam. No acute abnormality.   8/7/17 MRI:  Stress fracture of the 3rd metatarsal.  Periarticular erosions involving the 1st interphalangeal joint and 4th and 5th MTP joints, findings corresponding to this patient's history of rheumatoid arthritis.  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  9/12/17  MRI:   Continued visualization of abnormal bone marrow signal involving the third metatarsal with a nondisplaced fracture involving the proximal metaphysis of the third metatarsal. There has been interval increased osseous callus when compared to previous MRI dated 8/7/17.  9/21/17 XRAY  3 views    Acute nondisplaced transverse fracture identified " involving the fourth metatarsal bone.  Healing fracture of the third metatarsal bone identified in satisfactory position and alignment.  DJD and inflammatory arthropathy again noted.  Pes planus.  10/3/17 xrays 3 views: There are fractures of the third and fourth metatarsals.  There is DJD of flatfoot deformity and cystic change of the fourth metatarsal head probably degenerative.  11/2/17: Comparison: 10/3/17      Findings:Unchanged alignment of the third and fourth metatarsal fractures when compared to most recent prior. There is mild (approximately 1/2 shaft width) displacement laterally of the fourth metatarsal fracture. Minimal interval callus formation when compared to most recent prior. Unchanged pes planus.  12/5/17:  3 views    Healing fracture of the third and fourth metatarsal bones remain in unchanged and satisfactory position as compared to the previous study.  Mild DJD, osteopenia and hallux valgus.  Pes planus.  1/4/18 3 views  Healing fracture of the third and fourth metatarsal bone remain unchanged position as compared to the previous study.  DJD.  2/15/18   Narrative     Right foot AP, lateral, and oblique views. Comparison 1/4/18. The mid shafts of the third and fourth metatarsals again show incompletely healed fractures with some callus present. Position and alignment are satisfactory. No new fracture is identified.    Periarticular erosions consistent with rheumatoid arthritis are again seen in multiple joints. Small bunion is present. No significant soft tissue swelling is seen.     3/5/18  MRI Impression    Chronic fractures midshaft RIGHT 3rd and 4th metatarsals with callus formation.  Osseous edema RIGHT 5th metatarsal, likely nondisplaced fracture at that level.     4/24/18   FINDINGS:      Fractures of the 3rd 4th and 5th metatarsals noted.  Interval fracture of the 5th metatarsal since the prior study.  Narrowing at the 1st MTP and multiple IP joints.    6/11/18 MRI:  COMPARISON: MRI  3/5/18.   FINDINGS:  Redemonstration of chronic midshaft fractures of the right 3rd and 4th metatarsals with callus formation.  There is an oblique fracture through the midshaft of the right 5th metatarsal with slight medial angulation of the distal fracture fragment now evident.  Minimal associated osseous edema appreciated.  Trace periosteal is edema persists about the 5th metatarsal shaft.  No additional fractures identified.  Lisfranc ligament appears intact.  There are prominent degenerative changes again noted at the tarsals and metatarsals.  Prominent degenerative changes also noted at the interphalangeal joint of the great toe with subchondral cystic change, joint space narrowing, and osteophytosis.            ASSESSMENT/PLAN:      I counseled the patient on her conditions, their implications and medical management.      Metatarsal fracture, pathologic, right, sequela  -     ORTHOTIC DEVICE (DME)    Rheumatoid arthritis involving multiple sites, unspecified rheumatoid factor presence  -     ORTHOTIC DEVICE (DME)    Pes planus of both feet  -     ORTHOTIC DEVICE (DME)         · Continue rest.  May transition to athletic shoes with custom foot orthotics.  Never walk barefoot.  Avoid thin sandals.  Monitor for worsening signs and symptoms.    · Continue bone stimulator  · See orders above  · MRI reviewed with the patient.     Follow up in 3 months for foot exam or sooner if concerned.

## 2018-06-26 NOTE — PROGRESS NOTES
"Occupational Therapy Daily Note     Date: 2018  Name: Joyce Peterson  YOB: 1960  Chart Number: [id number]  Referring Physician: Meagan Stinson PA-C  Diagnosis: No diagnosis found.  Involved Side: {Right/left:45158}  Date of Injury/Surgery: ***  Surgical Procedure: ***  Date of OT Evaluation: ***  Date of Return to MD: ***    Visit #: *** of ***. Visits  on ***.    Subjective   "***"  Pain Report  Current: {PAIN 0-10:68742} out of 10 @ ***  With activity: {PAIN 0-10:08136} out of 10 @***    Objective   ***   Treatment    ***  Reviewed previously issued HEP with patient to ensure proper performance of therex.     Charges   Start Time: ***  End Time: ***  Total Time: ***   Charge Unit Time   Moist Hot Pack - 82960 *** ***   Fluidotherapy - 09224     Ultrasound - 37949     Paraffin - 40336     Therapeutic Exercise - 85853     Therapeutic Activity - 64923     Manual Therapy - 62914       Assessment   Patient is a 57 y.o.  {MALE/FEMALE:44018} with ***.    Plan   Continue skilled occupational therapy ***x/week for *** weeks from *** to ***.    Therapist: ***       "

## 2018-06-26 NOTE — PROGRESS NOTES
"  Occupational Therapy Daily Treatment Note      Date: 6/26/2018  Name: Joyce Peterson  Clinic Number: [id number]    Medical Diagnosis: M77.12 (ICD-10-CM) - Lateral epicondylitis of left elbow  S59.902A (ICD-10-CM) - Injury of left elbow, initial encounter  Partial tear of ECRB tendon, Grade II sprain of RCL, triceps partial tear/tendinosis, Degenerative osteophyte spurring at the radial head, olecranon bursitis, Cartilage loss/ osteoarthritis at the radial capitellar joint  Date of Onset: 2 months ago for lateral epicondylitis; 3 weeks ago for elbow injury  Date of Surgery: N/A  Surgical Procedure: N/A  Therapy Diagnosis:        Encounter Diagnosis   Name Primary?   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  Previous Therapy: Previous OT for RA in hands and for tennis elbow in 2017     Physician: Meagan Stinson PA-C  Physician Orders: 2x/wk x 6 wks; Eval and treat, ROM (A/AA/P), modalities ok prn, HEP, pain control prior to strengthening  Date of Return to MD: unknown     Evaluation Date: 6/20/2018  Plan of Care Certification Period: 6/20/2018 - 8/1/2018     Visit #: 2/ Visits authorized: 50  Insurance Authorization period Expiration: 12/31/2018     Time In: 2:35  Time Out: 3:45  Total Billable Time: 70 minutes  Charges for this Visit: 1 MT, 1 US, 1 TE  Total Cost to Date: oop $3000. Met $1,097.64. Once met, covered 100%    Subjective     Pt reports/functional improvements: "It has been aggravated the past few days. More swollen." Pt also reports that the " counterforce cuff occasionally causes some pain. End range of exercises at home also causes some pain.  Response to previous treatment: Pt was compliant with home exercise program given last session.   Pain Scale:  5/10 on VAS currently.    Pain Location: Right lateral epicondyle and olecranon area     Objective     Observation/Appearance: Left lateral elbow and olecranon process appear slightly swollen.     Edema. Measured in centimeters.    6/20/2018 6/20/2018         Left Right       2in. Above elbow 23.5 24.8       2in. Below elbow 22.5 22.5       Over elbow 24.0 24.0          Elbow and Wrist ROM. Measured in degrees.    6/20/2018 6/20/2018         Left Right       Elbow Ext/Flex 2/151 0/153       Supination/Pronation 60/90 55/90       Wrist Ext/Flex WNL WNL       Wrist RD/UD WNL WNL           Strength (Dyanmometer) and Pinch Strength (Pinch Gauge)  Measured in pounds.    6/20/2018 6/20/2018               Left Right       Rung II defer defer       Key Pinch           3pt Pinch           2pt Pinch              Sensation- Pt denies numbness and tingling.     CMS Impairment/Limitation/Restriction for FOTO Survey  Therapist reviewed FOTO scores for Joyce Peterson on 6/20/2018.   FOTO documents entered into [employer] - see Media section.     Limitation Score: 57%  Category: Carrying     Current : CK = at least 40% but < 60% impaired, limited or restricted  Goal: CJ = at least 20% but < 40% impaired, limited or restricted  Discharge: TBD      Treatment     Joyce received the following supervised modalities after being cleared for contradictions for 15 minutes:   MHP applied to left elbow x 15 minutes for increased circulation and increased tissue elasticity prior to therapeutic exercises/activities.    Joyce received the following direct contact modalities after being cleared for contraindications for 12 minutes:  Pt received ultrasound at 3Mhz, 100% duty cycle, and 0.8 w/cm2 for 6 minutes to lateral epicondyle  region and 6 minutes to the triceps insertion area for decreased pain and increased blood flow.    Joyce received the following manual therapy techniques for 10 minutes:   Deep friction massage to dorsal FA, lateral epicondyle region, and triceps insertion region x 8 min for increased blood flow and decreased pain.   Gentle passive stretch on wedge with elbow extended: wrist flexion and extension x 1 min each    Pt received 3 kinesiology tape correction strips applied at 80% stretch over area of pain on L dorsal FA. Pt received 3 extra strips for at-home application. Educated on application and removal techniques. Pt verbalized understanding.    Joyce received therapeutic exercises for 23 minutes including:  Pt then performed AROM elbow 3 ways, supination/pronation, and overhead tricep kickbacks x 10 reps each.  All exercises performed within a pain-free range.    Home Exercises and Education Provided     Education provided: Pt encouraged to perform all home exercises within a pain-free range. Pt also educated on application and removal techniques for kinesiology tape.   - Progress towards goals   - Role of therapy, goals for therapy  No spiritual or educational barriers to learning provided    Written Home Exercises Provided: Continue previously issued HEP within a pain-free range.  Exercises were reviewed and Joyce was able to demonstrate them prior to the end of the session.   Pt received a written copy of exercises to perform at home (see patient instructions).    Joyce demonstrated good  understanding of the education provided.     Assessment     Joyce Peterson is a 57 y.o. female referred to outpatient occupational/hand therapy and presents with a medical diagnosis of Left lateral epicondylitis and left RCL Grade II sprain. Pt reports recent increase in pain. Initiated ultrasound and kinesiology tape today with good participation. Pt continues to exhibit functional deficits due to pain.    Joyce is  progressing well towards her goals and there are no updates to goals at this time. Pt prognosis continues as Excellent. Pt will continue to benefit from skilled outpatient occupational therapy to address the deficits listed in the problem list on initial evaluation, provide pt/family education and to maximize pt's level of independence in the home and community environment.     Anticipated barriers to continued occupational therapy: none    Pt's spiritual, cultural and educational needs considered and pt agreeable to plan of care and goals.    Goals      Long Term Goals (LTGs); to be met by discharge.  LTG #1: Pt will report a pain level of 0 out of 10 at rest.  LTG #2: Pt will demo improved FOTO score by 19 points.   LTG #3: Pt will return to prior level of function for ADLs and household management.      Short Term Goals (STGs); to be met within 4 weeks (7/18/2018).  STG #1a: Pt will report 2 out of 10 pain level at rest.  STG #2a: Pt will report/demo [place] with normal hair routine frequency and technique.  STG #2b: PT will report/demo moderate difficulty with carrying a shopping bag or briefcase.  STG #2a: Pt will demonstrate independence with issued HEP.   STG #3b: Pt will demo improved left hand  strength equal to at least 80% of right dominant hand  strength    Plan     Continue skilled occupational therapy with individualized plan of care 2 times per week for 6 weeks during the certification period from 6/20/2018 to 8/1/2018 to achieve the established goals.     Discussed Plan of Care with patient: Yes  Updates/Grading for next session: Advance as tolerated.    Delilah Addison, RAJEEVS

## 2018-06-29 ENCOUNTER — OFFICE VISIT (OUTPATIENT)
Dept: UROLOGY | Facility: CLINIC | Age: 58
End: 2018-06-29
Payer: MEDICARE

## 2018-06-29 ENCOUNTER — CLINICAL SUPPORT (OUTPATIENT)
Dept: REHABILITATION | Facility: HOSPITAL | Age: 58
End: 2018-06-29
Payer: MEDICARE

## 2018-06-29 VITALS
HEART RATE: 83 BPM | SYSTOLIC BLOOD PRESSURE: 125 MMHG | HEIGHT: 61 IN | WEIGHT: 152.13 LBS | DIASTOLIC BLOOD PRESSURE: 79 MMHG | BODY MASS INDEX: 28.72 KG/M2

## 2018-06-29 DIAGNOSIS — N39.0 RECURRENT UTI: Primary | ICD-10-CM

## 2018-06-29 DIAGNOSIS — R35.0 INCREASED FREQUENCY OF URINATION: ICD-10-CM

## 2018-06-29 DIAGNOSIS — N95.2 VAGINAL ATROPHY: ICD-10-CM

## 2018-06-29 DIAGNOSIS — R39.15 URINARY URGENCY: ICD-10-CM

## 2018-06-29 DIAGNOSIS — M25.522 LEFT ELBOW PAIN: ICD-10-CM

## 2018-06-29 PROCEDURE — 99999 PR PBB SHADOW E&M-EST. PATIENT-LVL V: CPT | Mod: PBBFAC,,, | Performed by: UROLOGY

## 2018-06-29 PROCEDURE — 97035 APP MDLTY 1+ULTRASOUND EA 15: CPT | Mod: PN

## 2018-06-29 PROCEDURE — 81002 URINALYSIS NONAUTO W/O SCOPE: CPT | Mod: S$GLB,,, | Performed by: UROLOGY

## 2018-06-29 PROCEDURE — 51701 INSERT BLADDER CATHETER: CPT | Mod: S$GLB,,, | Performed by: UROLOGY

## 2018-06-29 PROCEDURE — 3008F BODY MASS INDEX DOCD: CPT | Mod: CPTII,S$GLB,, | Performed by: UROLOGY

## 2018-06-29 PROCEDURE — 97110 THERAPEUTIC EXERCISES: CPT | Mod: PN

## 2018-06-29 PROCEDURE — 97140 MANUAL THERAPY 1/> REGIONS: CPT | Mod: PN

## 2018-06-29 PROCEDURE — 99215 OFFICE O/P EST HI 40 MIN: CPT | Mod: 25,S$GLB,, | Performed by: UROLOGY

## 2018-06-29 PROCEDURE — 87086 URINE CULTURE/COLONY COUNT: CPT

## 2018-06-29 RX ORDER — AMOXICILLIN 250 MG/1
500 CAPSULE ORAL ONCE
Status: CANCELLED | OUTPATIENT
Start: 2018-06-29 | End: 2018-06-29

## 2018-06-29 RX ORDER — CEFDINIR 300 MG/1
300 CAPSULE ORAL 2 TIMES DAILY
Refills: 0 | COMMUNITY
Start: 2018-05-10 | End: 2018-07-09

## 2018-06-29 RX ORDER — DIAZEPAM 5 MG/1
TABLET ORAL
Refills: 0 | COMMUNITY
Start: 2018-06-06 | End: 2018-07-20

## 2018-06-29 RX ORDER — TROSPIUM CHLORIDE 20 MG/1
20 TABLET, FILM COATED ORAL 2 TIMES DAILY
Qty: 60 TABLET | Refills: 11 | Status: SHIPPED | OUTPATIENT
Start: 2018-06-29 | End: 2019-04-11

## 2018-06-29 RX ORDER — LIDOCAINE HYDROCHLORIDE 20 MG/ML
JELLY TOPICAL ONCE
Status: CANCELLED | OUTPATIENT
Start: 2018-06-29 | End: 2018-06-29

## 2018-06-29 NOTE — PROGRESS NOTES
CHIEF COMPLAINT:    Mrs. Peterson is a 57 y.o. female presenting for a consultation at the request of Dr. King. Patient presents with recurrent UTI.    PRESENTING ILLNESS:    Joyce Peterson is a 57 y.o. female who has a history of rheumatoid and osteoarthritis.  She is on prednisone and methotrexate.  She states she had seen Dr. Schneider for many years but she wanted to switch her care here.  She saw Dr. King for a renal cyst and he plans a follow up renal ultrasound to monitor.  She was sent for urinary frequency, urgency, double voids and sometimes has more than a piddle.  She has also had 4 urinary tract infections in the last several months.  Her symptoms are atypical, she has pain in the back and lower abdominal pain.  Does not have the typical urinary urgency, frequency symptoms.  She states she has frequent vaginal yeast infections.      , uterus in place, sexually active but has vaginal dryness, bowels are regular.     REVIEW OF SYSTEMS:    Review of Systems   Constitutional: Negative.    HENT: Negative.    Eyes: Negative.    Respiratory: Negative.    Cardiovascular: Negative.    Gastrointestinal: Positive for abdominal pain and heartburn. Negative for constipation.   Musculoskeletal: Positive for back pain and joint pain.   Skin: Negative.    Neurological: Negative.    Endo/Heme/Allergies: Negative.    Psychiatric/Behavioral: Negative.      PATIENT HISTORY:    Past Medical History:   Diagnosis Date    Acid reflux     Allergy     Anemia     Asthma     Coronary artery disease     Degenerative disc disease     Dry eyes     Dry mouth     Gastroparesis     Hyperlipidemia     Lateral meniscus derangement 2016    Osteoarthritis     Osteoporosis     Rheumatoid arthritis(714.0)     Umbilical hernia 2015       Past Surgical History:   Procedure Laterality Date    BREAST BIOPSY Left 2002    core bx    CARPAL TUNNEL RELEASE Right 2017    CHOLECYSTECTOMY  2004     COLONOSCOPY      10/11    COLONOSCOPY N/A 6/29/2017    Procedure: COLONOSCOPY;  Surgeon: López Moore MD;  Location: [facility] ([address] [place] [address]);  Service: Endoscopy;  Laterality: N/A;    TONSILLECTOMY      TUBAL LIGATION  2003    UPPER GASTROINTESTINAL ENDOSCOPY      10/11    uterine ablation  2003       Family History   Problem Relation Age of Onset    Cancer Mother         Lung Cancer    Emphysema Mother     Heart attack Mother     Cancer Father         Lung Cancer    Osteoarthritis Father     Lung cancer Father     Skin cancer Father     Heart disease Brother     Heart attack Brother     Osteoarthritis Paternal Aunt     Retinal detachment Maternal Aunt      Social History    Marital status:      Social History Main Topics    Smoking status: Never Smoker    Smokeless tobacco: Never Used    Alcohol use Yes      Comment: occasionally    Drug use: No    Sexual activity: Yes     Partners: Male     Birth control/ protection: Surgical       Allergies:  Actemra [tocilizumab]; Codeine; Gold au 198; Hydroxychloroquine; Iodinated contrast- oral and iv dye; Iodine; Sulfa (sulfonamide antibiotics); Zofran [ondansetron hcl (pf)]; Methotrexate analogues; and Pneumovax 23 [pneumococcal 23-argenis ps vaccine]    Medications:  Outpatient Encounter Prescriptions as of 6/29/2018   Medication Sig Dispense Refill    albuterol 90 mcg/actuation inhaler Inhale 2 puffs into the lungs every 6 (six) hours as needed for Wheezing. 18 g 11    aspirin 81 mg Tab Take 81 mg by mouth every morning.       azelastine (ASTELIN) 137 mcg nasal spray 1 spray (137 mcg total) by Nasal route 2 (two) times daily. (Patient taking differently: 1 spray by Nasal route 2 (two) times daily as needed. ) 30 mL 11    budesonide-formoterol 160-4.5 mcg (SYMBICORT) 160-4.5 mcg/actuation HFAA Inhale 2 puffs into the lungs every 12 (twelve) hours. 3 Inhaler 3    calcium citrate-vitamin D (CITRACAL + D) 315-200 mg-unit per tablet Take 1 tablet by  mouth 2 (two) times daily.       CYCLOSPORINE (RESTASIS OPHT) Inject 0.05 % into the eye. 1 Dropperette Both eyes Twice a day .  dispense one month supply/sixty-four vials/ two trays      cyclosporine 0.05 % Drop Apply 1 drop to eye 2 (two) times daily. DISPENSE 3 MONTH SUPPLY 5.5 mL 11    fish oil-omega-3 fatty acids 300-1,000 mg capsule Take 1,400 g by mouth once daily.      flaxseed oil 1,000 mg Cap Take by mouth once daily.      fluconazole (DIFLUCAN) 100 MG tablet Take 100 mg by mouth once daily.  3    fluticasone (FLONASE) 50 mcg/actuation nasal spray       folic acid (FOLVITE) 1 MG tablet Take 1 tablet (1 mg total) by mouth once daily. 90 tablet 1    GUAIFENESIN (MUCINEX ORAL) Take 400 mg by mouth every 4 (four) hours as needed.       INV PROPULSID 10 MG Take 20 mg by mouth 4 (four) times daily as directed by physician.  For investigational use only 480 each 2    INV PROPULSID 10 MG Take 10 mg by mouth 4 (four) times daily. 760 each 0    leflunomide (ARAVA) 20 MG Tab TAKE 1 TABLET (20 MG TOTAL) BY MOUTH ONCE DAILY. 90 tablet 0    methotrexate 25 mg/mL injection Inject 0.2 mLs (5 mg total) into the skin every 7 days. 2 mL 0    montelukast (SINGULAIR) 10 mg tablet Take 1 tablet (10 mg total) by mouth nightly. 90 tablet 3    naproxen (NAPROSYN) 500 MG tablet Take 1 tablet (500 mg total) by mouth 2 (two) times daily as needed. 180 tablet 1    omeprazole (PRILOSEC) 40 MG capsule TAKE ONE CAPSULE BY MOUTH EVERY MORNING 30 capsule 6    omeprazole-sodium bicarbonate (ZEGERID) 40-1.1 mg-gram per capsule Take 1 capsule by mouth every morning. 90 capsule 3    POTASSIUM ORAL Take by mouth once daily.      predniSONE (DELTASONE) 1 MG tablet Take 3 tablets (3 mg total) by mouth once daily. 270 tablet 1    predniSONE (DELTASONE) 1 MG tablet Take 3 tablets (3 mg total) by mouth once daily. 90 tablet 1    progesterone (PROMETRIUM) 100 MG capsule Take 100 mg by mouth every morning. 1 Capsule Oral Every day,  "morning      RESTASIS 0.05 % ophthalmic emulsion       rizatriptan (MAXALT) 10 MG tablet Take 10 mg by mouth as needed for Migraine.       rosuvastatin (CRESTOR) 20 MG tablet TAKE 1 TABLET EVERY DAY 90 tablet 3    sucralfate (CARAFATE) 1 gram tablet Take 1 tablet (1 g total) by mouth 4 (four) times daily. 360 tablet 3    syringe with needle (TUBERCULIN SYRINGE) 1 mL 27 x 1/2" Syrg To administer methotrexate 7.5mg sc once a week 12 Syringe 3    tofacitinib (XELJANZ XR) 11 mg Tb24 Take 11 mg by mouth once daily. 90 tablet 0    tramadol (ULTRAM) 50 mg tablet TAKE 1 TABLET DAILY AS NEEDED PAIN 30 tablet 0    valACYclovir (VALTREX) 1000 MG tablet TAKE 1 TABLET BY MOUTH TWICE A DAY AS NEEDED 20 tablet 3    [DISCONTINUED] VESICARE 10 mg tablet Take 1 tablet by mouth every morning.       cefdinir (OMNICEF) 300 MG capsule Take 300 mg by mouth 2 (two) times daily.  0    conjugated estrogens (PREMARIN) vaginal cream Place 0.5 g vaginally 3 (three) times a week. 30 g 3    diazePAM (VALIUM) 5 MG tablet Take 1 tablet (5 mg total) by mouth once as needed for Anxiety (Take one tablet after checking into MRI, approximately 30 minutes prior to procedure, second if needed at time of procedure). 2 tablet 0    diazePAM (VALIUM) 5 MG tablet TAKE 1 TAB 30 MINUTES PRIOR TO PROCEDURE, THEN SECOND IF NEEDED AT TIME OF PROCEDURE  0    trospium (SANCTURA) 20 mg Tab tablet Take 1 tablet (20 mg total) by mouth 2 (two) times daily. 60 tablet 11    [DISCONTINUED] PREMARIN 0.625 mg tablet Take 0.625 mg by mouth once daily.  11     No facility-administered encounter medications on file as of 6/29/2018.          PHYSICAL EXAMINATION:    The patient generally appears in good health, is appropriately interactive, and is in no apparent distress.    Skin: No lesions.    Mental: Cooperative with normal affect.    Neuro: Grossly intact.    HEENT: Normal. No evidence of lymphadenopathy.    Chest:  normal inspiratory effort.    Abdomen:  Soft, " non-tender. No masses or organomegaly. Bladder is not palpable. No evidence of flank discomfort. No evidence of inguinal hernia.    Extremities: No clubbing, cyanosis, or edema    Normal external female genitalia  Grade II urogenital atrophy  Urethral meatus is normal  Urethra and bladder are nontender to bimanual exam  Well supported anteriorly and posteriorly   Uterus and cervix are normal  No adnexal masses  PVR by catheterization 10 ml    LABS:    Lab Results   Component Value Date    BUN 16 05/16/2018    BUN 16 05/16/2018    CREATININE 0.8 05/16/2018    CREATININE 0.8 05/16/2018     UA 1,000, pH 7, otherwise, negative    IMPRESSION:    Encounter Diagnoses   Name Primary?    Recurrent UTI Yes    Vaginal atrophy     Urinary urgency     Increased frequency of urination        PLAN:    1.  The catheterized specimen was sent for culture  2.  She is in the process of being weaned off oral premarin as she has been on it for 12 years.  3.  Once she has discontinued its use, then can use the vaginal estrogen cream three times a week  4.  May use olive oil as a vaginal lubricant.  I recommend keeping a separate bottle by the bedside to separate it from your salad oil so there is no contamination.  Do not use oil if using condoms.  Must use a water soluble lubricant.    5.  Cystoscopy      Copy to:   Dr. King

## 2018-06-29 NOTE — PATIENT INSTRUCTIONS
May use olive oil as a vaginal lubricant.  I recommend keeping a separate bottle by the bedside to separate it from your salad oil so there is no contamination.  Do not use oil if using condoms.  Must use a water soluble lubricant.          Cystoscopy    Cystoscopy is a procedure that lets your doctor look directly inside your urethra and bladder. It can be used to:  · Help diagnose a problem with your urethra, bladder, or kidneys.  · Take a sample (biopsy) of bladder or urethral tissue.  · Treat certain problems (such as removing kidney stones).  · Place a stent to bypass an obstruction.  · Take special X-rays of the kidneys.  Based on the findings, your doctor may recommend other tests or treatments.  What is a cystoscope?  A cystoscope is a telescope-like instrument that contains lenses and fiberoptics (small glass wires that make bright light). The cystoscope may be straight and rigid, or flexible to bend around curves in the urethra. The doctor may look directly into the cystoscope, or project the image onto a monitor.  Getting ready  · Ask your doctor if you should stop taking any medicines before the procedure.  · Ask whether you should avoid eating or drinking anything after midnight before the procedure.  · Follow any other instructions your doctor gives you.  Tell your doctor before the exam if you:  · Take any medicines, such as aspirin or blood thinners  · Have allergies to any medicines  · Are pregnant   The procedure  Cystoscopy is done in the doctors office, surgery center, or hospital. The doctor and a nurse are present during the procedure. It takes only a few minutes, longer if a biopsy, X-ray, or treatment needs to be done.  During the procedure:  · You lie on an exam table on your back, knees bent and legs apart. You are covered with a drape.  · Your urethra and the area around it are washed. Anesthetic jelly may be applied to numb the urethra. Other pain medicine is usually not needed. In some  cases, you may be offered a mild sedative to help you relax. If a more extensive procedure is to be done, such as a biopsy or kidney stone removal, general anesthesia may be needed.  · The cystoscope is inserted. A sterile fluid is put into the bladder to expand it. You may feel pressure from this fluid.  · When the procedure is done, the cystoscope is removed.  After the procedure  If you had a sedative, general anesthesia, or spinal anesthesia, you must have someone drive you home. Once youre home:  · Drink plenty of fluids.  · You may have burning or light bleeding when you urinate--this is normal.  · Medicines may be prescribed to ease any discomfort or prevent infection. Take these as directed.  · Call your doctor if you have heavy bleeding or blood clots, burning that lasts more than a day, a fever over 100°F  (38° C), or trouble urinating.  Date Last Reviewed: 1/1/2017  © 9414-0874 [employer]. [address] [place] [address], [place] [address]. All rights reserved. This information is not intended as a substitute for professional medical care. Always follow your healthcare professional's instructions.

## 2018-06-29 NOTE — PROGRESS NOTES
"  Occupational Therapy Daily Treatment Note      Date: 6/29/2018  Name: Joyce Peterson  Clinic Number: [id number]    Medical Diagnosis: M77.12 (ICD-10-CM) - Lateral epicondylitis of left elbow  S59.902A (ICD-10-CM) - Injury of left elbow, initial encounter  Partial tear of ECRB tendon, Grade II sprain of RCL, triceps partial tear/tendinosis, Degenerative osteophyte spurring at the radial head, olecranon bursitis, Cartilage loss/ osteoarthritis at the radial capitellar joint  Date of Onset: 2 months ago for lateral epicondylitis; 3 weeks ago for elbow injury  Date of Surgery: N/A  Surgical Procedure: N/A  Therapy Diagnosis:        Encounter Diagnosis   Name Primary?   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  Previous Therapy: Previous OT for RA in hands and for tennis elbow in 2017     Physician: Meagan Stinson PA-C  Physician Orders: 2x/wk x 6 wks; Eval and treat, ROM (A/AA/P), modalities ok prn, HEP, pain control prior to strengthening  Date of Return to MD: unknown     Evaluation Date: 6/20/2018  Plan of Care Certification Period: 6/20/2018 - 8/1/2018     Visit #: 3/ Visits authorized: 50  Insurance Authorization period Expiration: 12/31/2018     Time In: 2:05  Time Out: 3:00  Total Billable Time: 55 minutes  Charges for this Visit: 1 MT, 1 US, 1 TE   Total Cost to Date: oop $3000. Met $1,097.64. Once met, covered 100%    Subjective     Pt reports/functional improvements: "The pain is getting better! The ache is better. The tape stayed on until yesterday " "but it left a harsha."   Response to previous treatment: Pt was compliant with home exercise program given last session.   Pain Scale:  2-3/10 on VAS currently.    Pain Location: Right lateral epicondyle and olecranon area     Objective     Observation/Appearance: Left lateral elbow and olecranon process appear slightly swollen. Noted superficial bleeding from capillaries where kinesiology tape was applied.       Edema. Measured in centimeters.    6/20/2018 6/20/2018         Left Right       2in. Above elbow 23.5 24.8       2in. Below elbow 22.5 22.5       Over elbow 24.0 24.0          Elbow and Wrist ROM. Measured in degrees.    6/20/2018 6/20/2018         Left Right       Elbow Ext/Flex 2/151 0/153       Supination/Pronation 60/90 55/90       Wrist Ext/Flex WNL WNL       Wrist RD/UD WNL WNL           Strength (Dyanmometer) and Pinch Strength (Pinch Gauge)  Measured in pounds.    6/20/2018 6/20/2018               Left Right       Rung II defer defer       Key Pinch           3pt Pinch           2pt Pinch              Sensation- Pt denies numbness and tingling.     CMS Impairment/Limitation/Restriction for FOTO Survey  Therapist reviewed FOTO scores for Joyce GUALLPA Kristen on 6/20/2018.   FOTO documents entered into [employer] - see Media section.     Limitation Score: 57%  Category: Carrying     Current : CK = at least 40% but < 60% impaired, limited or restricted  Goal: CJ = at least 20% but < 40% impaired, limited or restricted  Discharge: TBD      Treatment     Joyce received the following supervised modalities after being cleared for contradictions for 15 minutes:   MHP applied to left elbow x 15 minutes for increased circulation and increased tissue elasticity prior to therapeutic exercises/activities.    Joyce received the following direct contact modalities after being cleared for contraindications for 14 minutes:  Pt received ultrasound at 3Mhz, 100% duty cycle, and 0.8 w/cm2 for 6 minutes to lateral " epicondyle region and 8 minutes to the triceps insertion area for decreased pain and increased blood flow.    Joyce received the following manual therapy techniques for 10 minutes:   Deep friction massage to dorsal FA, lateral epicondyle region, and triceps insertion region x 8 min for increased blood flow and decreased pain.   Gentle passive stretch on wedge with elbow extended: wrist flexion and extension x 1 min each    Joyce received therapeutic exercises for 16 minutes including:  Pt then performed AROM elbow 3 ways, supination/pronation, and overhead tricep kickbacks x 10 reps each.  Wrist ext stretch and wrist flexion stretch with elbow straight x30 sec hold; x2 reps each way  All exercises performed within a pain-free range.    Issued size C Tubigrip for elbow and size D Tubigrip for FA for edema control and pain relief.     Home Exercises and Education Provided     Education provided: Pt encouraged to discontinue kinesiology tape at home and continue Tubigrip for comfort and edema control.   - Progress towards goals   - Role of therapy, goals for therapy  No spiritual or educational barriers to learning provided    Written Home Exercises Provided: Continue previously issued HEP within a pain-free range.  Exercises were reviewed and Joyce was able to demonstrate them prior to the end of the session.   Pt received a written copy of exercises to perform at home (see patient instructions).    Joyce demonstrated good  understanding of the education provided.     Assessment     Joyce Peterson is a 57 y.o. female referred to outpatient occupational/hand therapy and presents with a medical diagnosis of Left lateral epicondylitis and left RCL Grade II sprain. Pt reports decrease in pain since last session. She reports that she has decreased use of counter-force cuff because it was increasing her pain. Continued ultrasound today with good participation. Discontinued kinesiology tape due to noted superficial  bleeding from capillaries where kinesiology tape was applied last session. Initiated active wrist flex/ext stretches with no increases in pain. Pt continues to exhibit functional deficits due to pain.    Joyce is progressing well towards her goals and there are no updates to goals at this time. Pt prognosis continues as Excellent. Pt will continue to benefit from skilled outpatient occupational therapy to address the deficits listed in the problem list on initial evaluation, provide pt/family education and to maximize pt's level of independence in the home and community environment.     Anticipated barriers to continued occupational therapy: none    Pt's spiritual, cultural and educational needs considered and pt agreeable to plan of care and goals.    Goals      Long Term Goals (LTGs); to be met by discharge.  LTG #1: Pt will report a pain level of 0 out of 10 at rest.  LTG #2: Pt will demo improved FOTO score by 19 points.   LTG #3: Pt will return to prior level of function for ADLs and household management.      Short Term Goals (STGs); to be met within 4 weeks (7/18/2018).  STG #1a: Pt will report 2 out of 10 pain level at rest.  STG #2a: Pt will report/demo Floyd with normal hair routine frequency and technique.  STG #2b: PT will report/demo moderate difficulty with carrying a shopping bag or briefcase.  STG #2a: Pt will demonstrate independence with issued HEP.   STG #3b: Pt will demo improved left hand  strength equal to at least 80% of right dominant hand  strength    Plan     Continue skilled occupational therapy with individualized plan of care 2 times per week for 6 weeks during the certification period from 6/20/2018 to 8/1/2018 to achieve the established goals.     Discussed Plan of Care with patient: Yes  Updates/Grading for next session: Advance as tolerated.    Delilah Nourse, MOTS

## 2018-06-30 LAB — BACTERIA UR CULT: NO GROWTH

## 2018-07-05 RX ORDER — TOFACITINIB 11 MG/1
TABLET, FILM COATED, EXTENDED RELEASE ORAL
Qty: 90 TABLET | Refills: 0 | Status: SHIPPED | OUTPATIENT
Start: 2018-07-05 | End: 2018-08-21 | Stop reason: SDUPTHER

## 2018-07-06 ENCOUNTER — PATIENT MESSAGE (OUTPATIENT)
Dept: RHEUMATOLOGY | Facility: CLINIC | Age: 58
End: 2018-07-06

## 2018-07-09 ENCOUNTER — PATIENT MESSAGE (OUTPATIENT)
Dept: RHEUMATOLOGY | Facility: CLINIC | Age: 58
End: 2018-07-09

## 2018-07-09 ENCOUNTER — HOSPITAL ENCOUNTER (OUTPATIENT)
Dept: RADIOLOGY | Facility: HOSPITAL | Age: 58
Discharge: HOME | End: 2018-07-09
Attending: SPECIALIST
Payer: MEDICARE

## 2018-07-09 ENCOUNTER — TELEPHONE (OUTPATIENT)
Dept: RHEUMATOLOGY | Facility: CLINIC | Age: 58
End: 2018-07-09

## 2018-07-09 ENCOUNTER — LAB VISIT (OUTPATIENT)
Dept: LAB | Facility: HOSPITAL | Age: 58
End: 2018-07-09
Attending: INTERNAL MEDICINE
Payer: MEDICARE

## 2018-07-09 ENCOUNTER — HOSPITAL ENCOUNTER (OUTPATIENT)
Dept: UROLOGY | Facility: HOSPITAL | Age: 58
Discharge: HOME | End: 2018-07-09
Attending: UROLOGY
Payer: MEDICARE

## 2018-07-09 VITALS
DIASTOLIC BLOOD PRESSURE: 67 MMHG | WEIGHT: 152.75 LBS | HEIGHT: 61 IN | SYSTOLIC BLOOD PRESSURE: 152 MMHG | TEMPERATURE: 99 F | BODY MASS INDEX: 28.84 KG/M2 | HEART RATE: 95 BPM

## 2018-07-09 DIAGNOSIS — Z12.39 SCREENING BREAST EXAMINATION: ICD-10-CM

## 2018-07-09 DIAGNOSIS — R74.8 HIGH SERUM GAMMA GLUTAMYL TRANSFERASE (GGT): Primary | ICD-10-CM

## 2018-07-09 DIAGNOSIS — R74.8 ABNORMAL GGT TEST: ICD-10-CM

## 2018-07-09 DIAGNOSIS — N39.0 RECURRENT UTI: ICD-10-CM

## 2018-07-09 LAB
ALBUMIN SERPL BCP-MCNC: 3.9 G/DL
ALP SERPL-CCNC: 92 U/L
ALT SERPL W/O P-5'-P-CCNC: 32 U/L
AST SERPL-CCNC: 28 U/L
BILIRUB DIRECT SERPL-MCNC: 0.2 MG/DL
BILIRUB SERPL-MCNC: 0.3 MG/DL
GGT SERPL-CCNC: 98 U/L
PROT SERPL-MCNC: 6.8 G/DL

## 2018-07-09 PROCEDURE — 52000 CYSTOURETHROSCOPY: CPT | Mod: ,,, | Performed by: UROLOGY

## 2018-07-09 PROCEDURE — 36415 COLL VENOUS BLD VENIPUNCTURE: CPT

## 2018-07-09 PROCEDURE — 80076 HEPATIC FUNCTION PANEL: CPT

## 2018-07-09 PROCEDURE — 77067 SCR MAMMO BI INCL CAD: CPT | Mod: 26,,, | Performed by: RADIOLOGY

## 2018-07-09 PROCEDURE — 77067 SCR MAMMO BI INCL CAD: CPT | Mod: TC

## 2018-07-09 PROCEDURE — 77063 BREAST TOMOSYNTHESIS BI: CPT | Mod: 26,,, | Performed by: RADIOLOGY

## 2018-07-09 PROCEDURE — 52000 CYSTOURETHROSCOPY: CPT

## 2018-07-09 PROCEDURE — 82977 ASSAY OF GGT: CPT

## 2018-07-09 RX ORDER — AMOXICILLIN 500 MG/1
500 CAPSULE ORAL ONCE
Status: COMPLETED | OUTPATIENT
Start: 2018-07-09 | End: 2018-07-09

## 2018-07-09 RX ORDER — LIDOCAINE HYDROCHLORIDE 20 MG/ML
JELLY TOPICAL ONCE
Status: COMPLETED | OUTPATIENT
Start: 2018-07-09 | End: 2018-07-09

## 2018-07-09 RX ADMIN — AMOXICILLIN 500 MG: 500 CAPSULE ORAL at 09:07

## 2018-07-09 RX ADMIN — LIDOCAINE HYDROCHLORIDE: 20 JELLY TOPICAL at 09:07

## 2018-07-09 NOTE — PATIENT INSTRUCTIONS
What to Expect After a Cystoscopy  For the next 24-48 hours, you may feel a mild burning when you urinate. This burning is normal and expected. Drink plenty of water to dilute the urine to help relieve the burning sensation. You may also see a small amount of blood in your urine after the procedure.    Unless you are already taking antibiotics, you may be given an antibiotic after the test to prevent infection.    Signs and Symptoms to Report  Call the [facility] at [phone] if you develop any of the following:  · Fever of 101 degrees or higher  · Chills or persistent bleeding  · Inability to urinate

## 2018-07-09 NOTE — PROCEDURES
CYSTOSCOPY REPORT    Pre Procedure Diagnosis:  Recurrent UTI    Post Procedure Diagnosis:  Right ureterocele    Anesthesia: 10 cc 2% lidocaine jelly applied per urethra.    14 FR Flexible Olympus cystoscope used.    FINDINGS:  Dome, anterior, posterior, lateral walls and bladder base free of urothelial abnormalities. Right and left ureteral orifices in the normal postion and configuration, both effluxed clear urine.   The patient had a right sided ureterocele.   Bladder neck and urethra were normal.    Specimen:  none    The patient was taken to the cystoscopy suite and placed in dorsal lithotomy position.  The genitalia was prepped and draped  in the usual sterile fashion.  Two percent lidocaine jelly was inserted in the urethra.  After sufficent time had passed to allow good local anesthesia, the cystoscope was inserted in the urethra and passed into the bladder visualizing the urethra along its entire course.  The dome, anterior, posterior and lateral walls were examined systematically.  The ureteral orifices were in their usual position and configuration.  The cystoscope was turned upon itself 180 degrees to visualize the bladder neck.  The cystoscope was then brought to the level of the bladder neck, the water was turned on and the urethra was visualized.  The cystoscope was removed and the patient was instructed to urinate prior to leaving the office.     Post procedure medication:  Amoxicillin 500 mg x 1     ASSESSMENT/PLAN:  57 year old woman status post flexible cystoscopy.  1. Push fluids for 24 hours.  2. May see blood in the urine, this should gradually improve over the next 2-3 days.  3. The patient was instructed to return to the office or go to the emergency should fever, chills, cloudy urine, or inability to urinate develop.  4. Follow up consider incision of ureterocele.

## 2018-07-09 NOTE — H&P (VIEW-ONLY)
CHIEF COMPLAINT:    Mrs. Peterson is a 57 y.o. female presenting for a consultation at the request of Dr. King. Patient presents with recurrent UTI.    PRESENTING ILLNESS:    Joyce Peterson is a 57 y.o. female who has a history of rheumatoid and osteoarthritis.  She is on prednisone and methotrexate.  She states she had seen Dr. Schneider for many years but she wanted to switch her care here.  She saw Dr. King for a renal cyst and he plans a follow up renal ultrasound to monitor.  She was sent for urinary frequency, urgency, double voids and sometimes has more than a piddle.  She has also had 4 urinary tract infections in the last several months.  Her symptoms are atypical, she has pain in the back and lower abdominal pain.  Does not have the typical urinary urgency, frequency symptoms.  She states she has frequent vaginal yeast infections.      , uterus in place, sexually active but has vaginal dryness, bowels are regular.     REVIEW OF SYSTEMS:    Review of Systems   Constitutional: Negative.    HENT: Negative.    Eyes: Negative.    Respiratory: Negative.    Cardiovascular: Negative.    Gastrointestinal: Positive for abdominal pain and heartburn. Negative for constipation.   Musculoskeletal: Positive for back pain and joint pain.   Skin: Negative.    Neurological: Negative.    Endo/Heme/Allergies: Negative.    Psychiatric/Behavioral: Negative.      PATIENT HISTORY:    Past Medical History:   Diagnosis Date    Acid reflux     Allergy     Anemia     Asthma     Coronary artery disease     Degenerative disc disease     Dry eyes     Dry mouth     Gastroparesis     Hyperlipidemia     Lateral meniscus derangement 2016    Osteoarthritis     Osteoporosis     Rheumatoid arthritis(714.0)     Umbilical hernia 2015       Past Surgical History:   Procedure Laterality Date    BREAST BIOPSY Left 2002    core bx    CARPAL TUNNEL RELEASE Right 2017    CHOLECYSTECTOMY  2004     COLONOSCOPY      10/11    COLONOSCOPY N/A 6/29/2017    Procedure: COLONOSCOPY;  Surgeon: López Moore MD;  Location: [facility] ([address] [place] [address]);  Service: Endoscopy;  Laterality: N/A;    TONSILLECTOMY      TUBAL LIGATION  2003    UPPER GASTROINTESTINAL ENDOSCOPY      10/11    uterine ablation  2003       Family History   Problem Relation Age of Onset    Cancer Mother         Lung Cancer    Emphysema Mother     Heart attack Mother     Cancer Father         Lung Cancer    Osteoarthritis Father     Lung cancer Father     Skin cancer Father     Heart disease Brother     Heart attack Brother     Osteoarthritis Paternal Aunt     Retinal detachment Maternal Aunt      Social History    Marital status:      Social History Main Topics    Smoking status: Never Smoker    Smokeless tobacco: Never Used    Alcohol use Yes      Comment: occasionally    Drug use: No    Sexual activity: Yes     Partners: Male     Birth control/ protection: Surgical       Allergies:  Actemra [tocilizumab]; Codeine; Gold au 198; Hydroxychloroquine; Iodinated contrast- oral and iv dye; Iodine; Sulfa (sulfonamide antibiotics); Zofran [ondansetron hcl (pf)]; Methotrexate analogues; and Pneumovax 23 [pneumococcal 23-argenis ps vaccine]    Medications:  Outpatient Encounter Prescriptions as of 6/29/2018   Medication Sig Dispense Refill    albuterol 90 mcg/actuation inhaler Inhale 2 puffs into the lungs every 6 (six) hours as needed for Wheezing. 18 g 11    aspirin 81 mg Tab Take 81 mg by mouth every morning.       azelastine (ASTELIN) 137 mcg nasal spray 1 spray (137 mcg total) by Nasal route 2 (two) times daily. (Patient taking differently: 1 spray by Nasal route 2 (two) times daily as needed. ) 30 mL 11    budesonide-formoterol 160-4.5 mcg (SYMBICORT) 160-4.5 mcg/actuation HFAA Inhale 2 puffs into the lungs every 12 (twelve) hours. 3 Inhaler 3    calcium citrate-vitamin D (CITRACAL + D) 315-200 mg-unit per tablet Take 1 tablet by  mouth 2 (two) times daily.       CYCLOSPORINE (RESTASIS OPHT) Inject 0.05 % into the eye. 1 Dropperette Both eyes Twice a day .  dispense one month supply/sixty-four vials/ two trays      cyclosporine 0.05 % Drop Apply 1 drop to eye 2 (two) times daily. DISPENSE 3 MONTH SUPPLY 5.5 mL 11    fish oil-omega-3 fatty acids 300-1,000 mg capsule Take 1,400 g by mouth once daily.      flaxseed oil 1,000 mg Cap Take by mouth once daily.      fluconazole (DIFLUCAN) 100 MG tablet Take 100 mg by mouth once daily.  3    fluticasone (FLONASE) 50 mcg/actuation nasal spray       folic acid (FOLVITE) 1 MG tablet Take 1 tablet (1 mg total) by mouth once daily. 90 tablet 1    GUAIFENESIN (MUCINEX ORAL) Take 400 mg by mouth every 4 (four) hours as needed.       INV PROPULSID 10 MG Take 20 mg by mouth 4 (four) times daily as directed by physician.  For investigational use only 480 each 2    INV PROPULSID 10 MG Take 10 mg by mouth 4 (four) times daily. 760 each 0    leflunomide (ARAVA) 20 MG Tab TAKE 1 TABLET (20 MG TOTAL) BY MOUTH ONCE DAILY. 90 tablet 0    methotrexate 25 mg/mL injection Inject 0.2 mLs (5 mg total) into the skin every 7 days. 2 mL 0    montelukast (SINGULAIR) 10 mg tablet Take 1 tablet (10 mg total) by mouth nightly. 90 tablet 3    naproxen (NAPROSYN) 500 MG tablet Take 1 tablet (500 mg total) by mouth 2 (two) times daily as needed. 180 tablet 1    omeprazole (PRILOSEC) 40 MG capsule TAKE ONE CAPSULE BY MOUTH EVERY MORNING 30 capsule 6    omeprazole-sodium bicarbonate (ZEGERID) 40-1.1 mg-gram per capsule Take 1 capsule by mouth every morning. 90 capsule 3    POTASSIUM ORAL Take by mouth once daily.      predniSONE (DELTASONE) 1 MG tablet Take 3 tablets (3 mg total) by mouth once daily. 270 tablet 1    predniSONE (DELTASONE) 1 MG tablet Take 3 tablets (3 mg total) by mouth once daily. 90 tablet 1    progesterone (PROMETRIUM) 100 MG capsule Take 100 mg by mouth every morning. 1 Capsule Oral Every day,  "morning      RESTASIS 0.05 % ophthalmic emulsion       rizatriptan (MAXALT) 10 MG tablet Take 10 mg by mouth as needed for Migraine.       rosuvastatin (CRESTOR) 20 MG tablet TAKE 1 TABLET EVERY DAY 90 tablet 3    sucralfate (CARAFATE) 1 gram tablet Take 1 tablet (1 g total) by mouth 4 (four) times daily. 360 tablet 3    syringe with needle (TUBERCULIN SYRINGE) 1 mL 27 x 1/2" Syrg To administer methotrexate 7.5mg sc once a week 12 Syringe 3    tofacitinib (XELJANZ XR) 11 mg Tb24 Take 11 mg by mouth once daily. 90 tablet 0    tramadol (ULTRAM) 50 mg tablet TAKE 1 TABLET DAILY AS NEEDED PAIN 30 tablet 0    valACYclovir (VALTREX) 1000 MG tablet TAKE 1 TABLET BY MOUTH TWICE A DAY AS NEEDED 20 tablet 3    [DISCONTINUED] VESICARE 10 mg tablet Take 1 tablet by mouth every morning.       cefdinir (OMNICEF) 300 MG capsule Take 300 mg by mouth 2 (two) times daily.  0    conjugated estrogens (PREMARIN) vaginal cream Place 0.5 g vaginally 3 (three) times a week. 30 g 3    diazePAM (VALIUM) 5 MG tablet Take 1 tablet (5 mg total) by mouth once as needed for Anxiety (Take one tablet after checking into MRI, approximately 30 minutes prior to procedure, second if needed at time of procedure). 2 tablet 0    diazePAM (VALIUM) 5 MG tablet TAKE 1 TAB 30 MINUTES PRIOR TO PROCEDURE, THEN SECOND IF NEEDED AT TIME OF PROCEDURE  0    trospium (SANCTURA) 20 mg Tab tablet Take 1 tablet (20 mg total) by mouth 2 (two) times daily. 60 tablet 11    [DISCONTINUED] PREMARIN 0.625 mg tablet Take 0.625 mg by mouth once daily.  11     No facility-administered encounter medications on file as of 6/29/2018.          PHYSICAL EXAMINATION:    The patient generally appears in good health, is appropriately interactive, and is in no apparent distress.    Skin: No lesions.    Mental: Cooperative with normal affect.    Neuro: Grossly intact.    HEENT: Normal. No evidence of lymphadenopathy.    Chest:  normal inspiratory effort.    Abdomen:  Soft, " non-tender. No masses or organomegaly. Bladder is not palpable. No evidence of flank discomfort. No evidence of inguinal hernia.    Extremities: No clubbing, cyanosis, or edema    Normal external female genitalia  Grade II urogenital atrophy  Urethral meatus is normal  Urethra and bladder are nontender to bimanual exam  Well supported anteriorly and posteriorly   Uterus and cervix are normal  No adnexal masses  PVR by catheterization 10 ml    LABS:    Lab Results   Component Value Date    BUN 16 05/16/2018    BUN 16 05/16/2018    CREATININE 0.8 05/16/2018    CREATININE 0.8 05/16/2018     UA 1,000, pH 7, otherwise, negative    IMPRESSION:    Encounter Diagnoses   Name Primary?    Recurrent UTI Yes    Vaginal atrophy     Urinary urgency     Increased frequency of urination        PLAN:    1.  The catheterized specimen was sent for culture  2.  She is in the process of being weaned off oral premarin as she has been on it for 12 years.  3.  Once she has discontinued its use, then can use the vaginal estrogen cream three times a week  4.  May use olive oil as a vaginal lubricant.  I recommend keeping a separate bottle by the bedside to separate it from your salad oil so there is no contamination.  Do not use oil if using condoms.  Must use a water soluble lubricant.    5.  Cystoscopy      Copy to:   Dr. King

## 2018-07-09 NOTE — INTERVAL H&P NOTE
The patient has been examined and the H&P has been reviewed:    I concur with the findings and no changes have occurred since H&P was written.    Proceed with planned flexible cystoscopy.     There are no hospital problems to display for this patient.

## 2018-07-09 NOTE — TELEPHONE ENCOUNTER
Ronel, please schedule Hepatology consult for abnormal GGT in pt on methotrexate, tofcitinib, leflunomide and naproxen . Please schedule hepatitis panel.  Please schedule abdominal ultrasound as well./ Thanks MEGA

## 2018-07-11 ENCOUNTER — PATIENT MESSAGE (OUTPATIENT)
Dept: RHEUMATOLOGY | Facility: CLINIC | Age: 58
End: 2018-07-11

## 2018-07-13 ENCOUNTER — CLINICAL SUPPORT (OUTPATIENT)
Dept: REHABILITATION | Facility: HOSPITAL | Age: 58
End: 2018-07-13
Payer: MEDICARE

## 2018-07-13 DIAGNOSIS — M25.522 LEFT ELBOW PAIN: ICD-10-CM

## 2018-07-13 PROCEDURE — 97140 MANUAL THERAPY 1/> REGIONS: CPT | Mod: PN

## 2018-07-13 PROCEDURE — 97110 THERAPEUTIC EXERCISES: CPT | Mod: PN

## 2018-07-13 PROCEDURE — 97035 APP MDLTY 1+ULTRASOUND EA 15: CPT | Mod: PN

## 2018-07-13 NOTE — PROGRESS NOTES
Occupational Therapy Daily Treatment Note      Date: 7/13/2018  Name: Joyce Peterson  Clinic Number: [id number]    Medical Diagnosis: M77.12 (ICD-10-CM) - Lateral epicondylitis of left elbow  S59.902A (ICD-10-CM) - Injury of left elbow, initial encounter  Partial tear of ECRB tendon, Grade II sprain of RCL, triceps partial tear/tendinosis, Degenerative osteophyte spurring at the radial head, olecranon bursitis, Cartilage loss/ osteoarthritis at the radial capitellar joint  Date of Onset: 2 months ago for lateral epicondylitis; 3 weeks ago for elbow injury  Date of Surgery: N/A  Surgical Procedure: N/A  Therapy Diagnosis:        Encounter Diagnosis   Name Primary?   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  Previous Therapy: Previous OT for RA in hands and for tennis elbow in 2017     Physician: Meagan Stinson PA-C  Physician Orders: 2x/wk x 6 wks; Eval and treat, ROM (A/AA/P), modalities ok prn, HEP, pain control prior to strengthening  Date of Return to MD: 8/7/2018     Evaluation Date: 6/20/2018  Plan of Care Certification Period: 6/20/2018 - 8/1/2018     Visit #: 4/ Visits authorized: 50  Insurance Authorization period Expiration: 12/31/2018     Time In: 1:30 PM  Time Out: 2:45 PM  Total Billable Time: 75 minutes  Charges for this Visit: 1 MT, 1 US, 2 TE   Total Cost to Date: oop $3000. Met $1,097.64. Once met, covered 100%    Subjective     Pt reports/functional improvements: Pt reports pain relief from Tubigrip edema sleeve. She has discontinued  "counterforce cuff: "The only thing that works is that sleeve y'all gave me and coming here. I stopped wearing the cuff."  Response to previous treatment: Pt was compliant with home exercise program given last session.   Pain Scale:  5/10 on VAS currently.    Pain Location: Right olecranon / triceps area     Objective     Observation/Appearance: Left lateral elbow and olecranon process appear slightly swollen.      Edema. Measured in centimeters.    6/20/2018 6/20/2018 7/13/2018       Left Right      Left     2in. Above elbow 23.5 24.8  24.0 (+.5)     2in. Below elbow 22.5 22.5  23.1 (+.6)     Over elbow 24.0 24.0  24.0 (=)        Elbow and Wrist ROM. Measured in degrees.    6/20/2018 6/20/2018 7/13/2018        Left Right  Left     Elbow Ext/Flex 2/151 0/153  0/140 (+2/-11)     Supination/Pronation 60/90 55/90  66/90 (+11/=)     Wrist Ext/Flex WNL WNL WNL      Wrist RD/UD WNL WNL  WNL         Strength (Dyanmometer) and Pinch Strength (Pinch Gauge)  Measured in pounds.    6/20/2018 7/13/208 7/13/2018             Left/Right Left  Right     Rung II   Elbow at 90*  To point of pain defer No increase in pain with max  N/A    Rung II   Elbow at 90*  Max  45 54    Rung II  Elbow extended  To point of pain  Increase in pain noted by holding dynamometer upsupported N/A    Rung II  Elbow extended  Max  32    52     Key Pinch    11 12     3pt Pinch    11  13     2pt Pinch    10  12        Sensation- Pt denies numbness and tingling.     CMS Impairment/Limitation/Restriction for FOTO Survey  Therapist reviewed FOTO scores for Joyce Peterson on 6/20/2018.   FOTO documents entered into [employer] - see Media section.     Limitation Score: 57%  Category: Carrying     Current : CK = at least 40% but < 60% impaired, limited or restricted  Goal: CJ = at least 20% but < 40% impaired, limited or restricted  Discharge: TBD      Treatment     Joyce received the following supervised modalities after being cleared for contradictions " for 15 minutes:   MHP applied to left elbow x 15 minutes for increased circulation and increased tissue elasticity prior to therapeutic exercises/activities.    Joyce received the following direct contact modalities after being cleared for contraindications for 14 minutes:  Pt received ultrasound at 3Mhz, 100% duty cycle, and 1.0 w/cm2 for 6 minutes to lateral epicondyle region and 8 minutes to the triceps insertion area for decreased pain and increased blood flow.    Joyce received the following manual therapy techniques for 10 minutes:   Deep friction massage to dorsal FA, lateral epicondyle region, and triceps insertion region x 8 min for increased blood flow and decreased pain.   Gentle passive stretch on wedge with elbow extended: wrist flexion and extension x 1 min each    Joyce received therapeutic exercises for 36 minutes including:  Pt then performed AROM elbow 3 ways, supination/pronation, and overhead tricep kickbacks x 10 reps each.  Wrist ext stretch and wrist flexion stretch with elbow straight x30 sec hold; x2 reps each way  All exercises performed within a pain-free range.    Issued size C Tubigrip for elbow and size D Tubigrip for FA for edema control and pain relief.     Home Exercises and Education Provided     Education provided: Pt encouraged to continue Tubigrip for comfort and edema control.   - Progress towards goals   - Role of therapy, goals for therapy  No spiritual or educational barriers to learning provided    Written Home Exercises Provided: Continue previously issued HEP within a pain-free range.  Exercises were reviewed and Joyce was able to demonstrate them prior to the end of the session.   Pt received a written copy of exercises to perform at home (see patient instructions).    Joyce demonstrated good  understanding of the education provided.     Assessment     Joyce Peterson is a 57 y.o. female referred to outpatient occupational/hand therapy and presents with a medical  diagnosis of Left lateral epicondylitis and left RCL Grade II sprain. Pt reports decrease in lateral elbow pain and increase in posterior elbow pain. She reports that she has discontinued use of tennis elbow counter-force brace . Continued ultrasound today with good participation. Continued active wrist flex/ext stretches with no increases in pain. Took initial /pinch measurements today; pt experienced increase in pain while holding dynamometer unsupported with elbow extended. Pt continues to exhibit functional deficits due to pain.    Joyce is progressing well towards her goals and there are no updates to goals at this time. Pt prognosis continues as Excellent. Pt will continue to benefit from skilled outpatient occupational therapy to address the deficits listed in the problem list on initial evaluation, provide pt/family education and to maximize pt's level of independence in the home and community environment.     Anticipated barriers to continued occupational therapy: none    Pt's spiritual, cultural and educational needs considered and pt agreeable to plan of care and goals.    Goals      Long Term Goals (LTGs); to be met by discharge.  LTG #1: Pt will report a pain level of 0 out of 10 at rest.  LTG #2: Pt will demo improved FOTO score by 19 points.   LTG #3: Pt will return to prior level of function for ADLs and household management.      Short Term Goals (STGs); to be met within 4 weeks (7/18/2018).  STG #1a: Pt will report 2 out of 10 pain level at rest. NOT MET  STG #2a: Pt will report/demo [place] with normal hair routine frequency and technique. NOT ASSESSED  STG #2b: PT will report/demo moderate difficulty with carrying a shopping bag or briefcase.NOT ASSESSED  STG #2a: Pt will demonstrate independence with issued HEP. MET  STG #3b: Pt will demo improved left hand  strength equal to at least 80% of right dominant hand  strength NOT MET- ASSESSED TODAY    Plan     Continue skilled  occupational therapy with individualized plan of care 2 times per week for 6 weeks during the certification period from 6/20/2018 to 8/1/2018 to achieve the established goals.     Discussed Plan of Care with patient: Yes  Updates/Grading for next session: Advance to strengthening if pain is decreased.     Delilah Addison, MOTS

## 2018-07-16 ENCOUNTER — HOSPITAL ENCOUNTER (OUTPATIENT)
Dept: RADIOLOGY | Facility: HOSPITAL | Age: 58
Discharge: HOME | End: 2018-07-16
Attending: INTERNAL MEDICINE
Payer: MEDICARE

## 2018-07-16 DIAGNOSIS — R74.8 HIGH SERUM GAMMA GLUTAMYL TRANSFERASE (GGT): ICD-10-CM

## 2018-07-16 PROCEDURE — 76700 US EXAM ABDOM COMPLETE: CPT | Mod: TC

## 2018-07-16 PROCEDURE — 76700 US EXAM ABDOM COMPLETE: CPT | Mod: 26,,, | Performed by: INTERNAL MEDICINE

## 2018-07-18 ENCOUNTER — CLINICAL SUPPORT (OUTPATIENT)
Dept: REHABILITATION | Facility: HOSPITAL | Age: 58
End: 2018-07-18
Payer: MEDICARE

## 2018-07-18 DIAGNOSIS — M25.522 LEFT ELBOW PAIN: ICD-10-CM

## 2018-07-18 PROCEDURE — 97035 APP MDLTY 1+ULTRASOUND EA 15: CPT | Mod: PN

## 2018-07-18 PROCEDURE — 97140 MANUAL THERAPY 1/> REGIONS: CPT | Mod: PN

## 2018-07-18 PROCEDURE — 97110 THERAPEUTIC EXERCISES: CPT | Mod: PN

## 2018-07-18 NOTE — PROGRESS NOTES
"  Occupational Therapy Daily Treatment Note      Date: 7/18/2018  Name: Joyce Peterson  Clinic Number: [id number]    Medical Diagnosis: M77.12 (ICD-10-CM) - Lateral epicondylitis of left elbow  S59.902A (ICD-10-CM) - Injury of left elbow, initial encounter  Partial tear of ECRB tendon, Grade II sprain of RCL, triceps partial tear/tendinosis, Degenerative osteophyte spurring at the radial head, olecranon bursitis, Cartilage loss/ osteoarthritis at the radial capitellar joint  Date of Onset: 2 months ago for lateral epicondylitis; 3 weeks ago for elbow injury  Date of Surgery: N/A  Surgical Procedure: N/A  Therapy Diagnosis:        Encounter Diagnosis   Name Primary?   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  Previous Therapy: Previous OT for RA in hands and for tennis elbow in 2017     Physician: Meagan Stinson PA-C  Physician Orders: 2x/wk x 6 wks; Eval and treat, ROM (A/AA/P), modalities ok prn, HEP, pain control prior to strengthening  Date of Return to MD: 8/7/2018     Evaluation Date: 6/20/2018  Plan of Care Certification Period: 6/20/2018 - 8/1/2018     Visit #: 5 / Visits authorized: 50  Insurance Authorization period Expiration: 12/31/2018     Time In: 11:00 AM  Time Out: 12:00 PM  Total Billable Time: 60 minutes  Charges for this Visit: 1 MT, 1 US, 1 TE   Total Cost to Date: oop $3000. Met $1,097.64. Once met, covered 100%    Subjective     Pt reports/functional improvements: "The pain is fine during the day. But it woke me up last night. It always " "feels better when I leave here, but then it doesn't keep getting better." Pt reports that pain usually returns about 1 day after OT session.  Response to previous treatment: Pt was compliant with home exercise program given last session.   Pain Scale:  4-5/10 on VAS at start of session today                           2/10 after session today                           8/10 last night   Pain Location: Right olecranon / triceps area     Objective     Observation/Appearance: Left lateral elbow and olecranon bursa area appear slightly swollen.      Edema. Measured in centimeters.    6/20/2018 6/20/2018 7/13/2018       Left Right      Left     2in. Above elbow 23.5 24.8  24.0 (+.5)     2in. Below elbow 22.5 22.5  23.1 (+.6)     Over elbow 24.0 24.0  24.0 (=)        Elbow and Wrist ROM. Measured in degrees.    6/20/2018 6/20/2018 7/13/2018        Left Right  Left     Elbow Ext/Flex 2/151 0/153  0/140 (+2/-11)     Supination/Pronation 60/90 55/90  66/90 (+11/=)     Wrist Ext/Flex WNL WNL WNL      Wrist RD/UD WNL WNL  WNL         Strength (Dyanmometer) and Pinch Strength (Pinch Gauge)  Measured in pounds.    6/20/2018 7/13/208 7/13/2018             Left/Right Left  Right     Rung II   Elbow at 90*  To point of pain defer No increase in pain with max  N/A    Rung II   Elbow at 90*  Max  45 54    Rung II  Elbow extended  To point of pain  Increase in pain noted by holding dynamometer upsupported N/A    Rung II  Elbow extended  Max  32    52     Key Pinch    11 12     3pt Pinch    11  13     2pt Pinch    10  12        Sensation- Pt denies numbness and tingling.     CMS Impairment/Limitation/Restriction for FOTO Survey  Therapist reviewed FOTO scores for Joyce RAMU Kristen on 6/20/2018.   FOTO documents entered into "[employer] - see Media section.     Intake Limitation Score: 57%  5th Visit Limitation Score: 57%  Category: Carrying     Current : CK = at least 40% but < 60% impaired, limited or restricted  Goal: CJ = at least " 20% but < 40% impaired, limited or restricted  Discharge: TBD      Treatment     Joyce received the following supervised modalities after being cleared for contradictions for 15 minutes:   MHP applied to left elbow x 15 minutes for increased circulation and increased tissue elasticity prior to therapeutic exercises/activities.    Joyce received the following direct contact modalities after being cleared for contraindications for 14 minutes:  Pt received ultrasound at 3Mhz, 100% duty cycle, and 1.0 w/cm2 for 6 minutes to lateral epicondyle region and 8 minutes to the triceps insertion area for decreased pain and increased blood flow.    Joyce received the following manual therapy techniques for 10 minutes:   Deep friction massage to dorsal FA, lateral epicondyle region, and triceps insertion region x 8 min for increased blood flow and decreased pain.   Gentle passive stretch on wedge with elbow extended: wrist flexion and extension x 1 min hold x 2 reps each     Joyce received therapeutic exercises for 21 minutes including:  Pt then performed AROM elbow 3 ways, supination/pronation, and overhead tricep kickbacks x 20 reps each.  Wrist ext stretch and wrist flexion stretch with elbow straight x30 sec hold; x 3 reps each way  Triceps stretch x 30 sec hold x 5 reps   Wrist PREs 1# x 15 reps each way  Elbow PRE 2# x 15 reps each way   (All exercises performed within a pain-free range.)    Biofreeze applied to lateral epicondyle and triceps insertion region for pain relief.    Issued size C Tubigrip for elbow and size D Tubigrip for FA for edema control and pain relief.     Home Exercises and Education Provided     Education provided: Pt encouraged to increase daily stretching to 4 times per day.   - Progress towards goals   - Role of therapy, goals for therapy  No spiritual or educational barriers to learning provided    Written Home Exercises Provided: Continue previously issued HEP within a pain-free range. Add  wrist ext stretch over pillow.   Exercises were reviewed and Joyce was able to demonstrate them prior to the end of the session.   Pt received a written copy of exercises to perform at home (see patient instructions).    Joyce demonstrated good  understanding of the education provided.     Assessment     Joyce Peterson is a 57 y.o. female referred to outpatient occupational/hand therapy and presents with a medical diagnosis of Left lateral epicondylitis and left RCL Grade II sprain. Pt reports decreases in pain following therapy, but also reports that the pain usually returns about 1 day after OT sessions. Increased stretching repetitions and added wrist and elbow PREs today with no c/o pain. Pt advised to increase daily stretching to 4 times per day. Continued ultrasound today with good participation.Today's FOTO reassessment showed a 0 point increase since initial eval. Pt continues to exhibit functional deficits due to pain.    Joyce is progressing well towards her goals and there are no updates to goals at this time. Pt prognosis continues as Excellent. Pt will continue to benefit from skilled outpatient occupational therapy to address the deficits listed in the problem list on initial evaluation, provide pt/family education and to maximize pt's level of independence in the home and community environment.     Anticipated barriers to continued occupational therapy: none    Pt's spiritual, cultural and educational needs considered and pt agreeable to plan of care and goals.    Goals      Long Term Goals (LTGs); to be met by discharge.  LTG #1: Pt will report a pain level of 0 out of 10 at rest.  LTG #2: Pt will demo improved FOTO score by 19 points.   LTG #3: Pt will return to prior level of function for ADLs and household management.      Short Term Goals (STGs); to be met within 4 weeks (7/18/2018).  STG #1a: Pt will report 2 out of 10 pain level at rest. MET  STG #2a: Pt will report/demo [place]  with normal hair routine frequency and technique. NOT ASSESSED  STG #2b: PT will report/demo moderate difficulty with carrying a shopping bag or briefcase. MET  STG #2a: Pt will demonstrate independence with issued HEP. MET  STG #3b: Pt will demo improved left hand  strength equal to at least 80% of right dominant hand  strength NOT ASSESSED     Plan     Continue skilled occupational therapy with individualized plan of care 2 times per week for 6 weeks during the certification period from 6/20/2018 to 8/1/2018 to achieve the established goals.     Discussed Plan of Care with patient: Yes  Updates/Grading for next session: Advance strengthening if pain is decreased.     Delilah Nourse, MOTS

## 2018-07-20 ENCOUNTER — OFFICE VISIT (OUTPATIENT)
Dept: HEPATOLOGY | Facility: CLINIC | Age: 58
End: 2018-07-20
Payer: MEDICARE

## 2018-07-20 ENCOUNTER — CLINICAL SUPPORT (OUTPATIENT)
Dept: REHABILITATION | Facility: HOSPITAL | Age: 58
End: 2018-07-20
Payer: MEDICARE

## 2018-07-20 VITALS
DIASTOLIC BLOOD PRESSURE: 64 MMHG | HEIGHT: 61 IN | BODY MASS INDEX: 29.02 KG/M2 | HEART RATE: 98 BPM | RESPIRATION RATE: 18 BRPM | WEIGHT: 153.69 LBS | SYSTOLIC BLOOD PRESSURE: 155 MMHG | TEMPERATURE: 98 F | OXYGEN SATURATION: 98 %

## 2018-07-20 DIAGNOSIS — Z00.6 PATIENT IN CLINICAL RESEARCH STUDY: ICD-10-CM

## 2018-07-20 DIAGNOSIS — R74.8 ELEVATED SERUM GGT LEVEL: Primary | ICD-10-CM

## 2018-07-20 DIAGNOSIS — Z92.21 HX OF METHOTREXATE THERAPY: ICD-10-CM

## 2018-07-20 DIAGNOSIS — K31.84 GASTROPARESIS: Primary | ICD-10-CM

## 2018-07-20 DIAGNOSIS — M25.522 LEFT ELBOW PAIN: ICD-10-CM

## 2018-07-20 PROCEDURE — 97035 APP MDLTY 1+ULTRASOUND EA 15: CPT | Mod: PN

## 2018-07-20 PROCEDURE — 97140 MANUAL THERAPY 1/> REGIONS: CPT | Mod: PN

## 2018-07-20 PROCEDURE — 99204 OFFICE O/P NEW MOD 45 MIN: CPT | Mod: S$GLB,,, | Performed by: PHYSICIAN ASSISTANT

## 2018-07-20 PROCEDURE — 3008F BODY MASS INDEX DOCD: CPT | Mod: CPTII,S$GLB,, | Performed by: PHYSICIAN ASSISTANT

## 2018-07-20 PROCEDURE — 99999 PR PBB SHADOW E&M-EST. PATIENT-LVL III: CPT | Mod: PBBFAC,,, | Performed by: PHYSICIAN ASSISTANT

## 2018-07-20 PROCEDURE — 97110 THERAPEUTIC EXERCISES: CPT | Mod: PN

## 2018-07-20 NOTE — PROGRESS NOTES
HEPATOLOGY CLINIC VISIT NOTE     REFERRING PROVIDER: Dr. Isiah Moran    REASON FOR VISIT: elevated GGT    HISTORY: This is a 57 y.o. White female here for evaluation of elevated GGT, referred by rheumatology. She follows with rheumatology for RA and Sjogren's syndrome. She is on methotrexate x 15 years.     Review of labs reveals normal transaminases. She had a mild elevation 04/30 and 05/16 with normalization. Her GGT was 209 on 05/28 and 98 on 07/09. Her alkaline phosphatase is WNL. She has no previous h/o liver disease. Her only reported FH of liver disease relates to alcohol. She denies heavy alcohol use.     She has normal synthetic liver function. Her PLTs are well preserved.     She has had negative viral hepatitis screening. She had an U/S 07/2018 that was unremarkable.     Liver staging:  No formal staging, long term MTX use  Normal transaminases  Normal LFTs  PLT >150    Past Medical History:   Diagnosis Date    Acid reflux     Allergy     Anemia     Asthma     Coronary artery disease     Degenerative disc disease     Dry eyes     Dry mouth     Gastroparesis     Hyperlipidemia     Lateral meniscus derangement 4/6/2016    Lobular carcinoma in situ     Osteoarthritis     Osteoporosis     Rheumatoid arthritis(714.0)     Umbilical hernia 8/13/2015     Past Surgical History:   Procedure Laterality Date    BREAST BIOPSY Left 01/29/2002    core bx    CARPAL TUNNEL RELEASE Right 05/2017    CHOLECYSTECTOMY  2004    COLONOSCOPY      10/11    COLONOSCOPY N/A 6/29/2017    Procedure: COLONOSCOPY;  Surgeon: López Moore MD;  Location: [facility] ([address] [place] [address]);  Service: Endoscopy;  Laterality: N/A;    TONSILLECTOMY      TUBAL LIGATION  2003    UPPER GASTROINTESTINAL ENDOSCOPY      10/11    uterine ablation  2003     FAMILY HISTORY: Negative for liver disease  ETOH abuse in family   SOCIAL HISTORY:   History   Smoking Status    Never Smoker   Smokeless Tobacco    Never Used     History    Alcohol Use    Yes     Comment: occasionally       History   Drug Use No     ROS:   No fever, chills, weight loss  No chest pain, dyspnea, cough  No abdominal pain,  nausea, vomiting  No headaches, visual changes  No lower extremity edema  No depression or anxiety      PHYSICAL EXAM:  Friendly White female, in no acute distress; alert and oriented to person, place and time  VITALS: reviewed  HEENT: Sclerae anicteric.   NECK: Supple  CVS: Regular rate and rhythm. No murmurs  LUNGS: Normal respiratory effort. Clear bilaterally  ABDOMEN: Flat, soft, nontender. No organomegaly or masses. No ascites or hernias.   SKIN: Warm and dry. No jaundice, No obvious rashes.   EXTREMITIES: No lower extremity edema  NEURO/PSYCH: Normal gate. Memory intact. Thought and speech pattern appropriate. Behavior normal. No depression or anxiety noted.    RECENT LABS:  Lab Results   Component Value Date    WBC 6.86 05/28/2018    HGB 12.6 05/28/2018     05/28/2018     Lab Results   Component Value Date    INR 1.0 12/16/2015     Lab Results   Component Value Date    AST 28 07/09/2018    ALT 32 07/09/2018    BILITOT 0.3 07/09/2018    ALBUMIN 3.9 07/09/2018    ALKPHOS 92 07/09/2018    CREATININE 0.8 05/16/2018    CREATININE 0.8 05/16/2018    BUN 16 05/16/2018    BUN 16 05/16/2018     05/16/2018     05/16/2018    K 4.1 05/16/2018    K 4.1 05/16/2018     RECENT IMAGING:  U/S abdomen 07/2018  Narrative     EXAMINATION:  US ABDOMEN COMPLETE    CLINICAL HISTORY:  Other specified abnormal findings of blood chemistry    TECHNIQUE:  Complete abdominal ultrasound (including pancreas, liver, gallbladder, common bile duct, spleen, aorta, IVC, and kidneys) was performed.    COMPARISON:  February 20, 2018    FINDINGS:  The visualized pancreas is within normal limits.  The aorta is not aneurysmal.  The common duct measures 5 mm.  The gallbladder is absent.    Liver size is normal, 14.3 cm, and the parenchyma demonstrates no abnormality.   H RI is 0.95.    Visualized inferior vena cava is unremarkable.    Kidney sizes are normal, 11.7 cm on the right and 11.6 cm on the left.  These were better evaluated on recent renal ultrasound.    The spleen is not enlarged, 7.5 cm.   Impression       No finding to correlate with the history.    Cholecystectomy     ASSESSMENT  57 y.o. White female with:  1. ELEVATED GGT  --normal transaminases  --normal alkaline phosphatase  -- no biliary findings on imaging  -- no heavy alcohol use  -- no evidence of cholestasis  -- will check AMA, however alkaline phosphatase normal    2. LONG TERM METHOTREXATE USE  -- will do fibroscan to assess for steatosis and fibrosis     PLAN:  1. Labs today  2. Fibroscan  3. F/u TBD    Thank you for allowing me to participate in the care of Joyce Hobbs PA-C    Addendum  Fibroscan post visit F0-F1, no concerns for continued MTX use, AMA negative  No f/u advised in hepatology for GGT elevation as there is no clinical significance in absence of liver disease. F/u PRN

## 2018-07-20 NOTE — LETTER
July 20, 2018      Isiah Moran MD  [address]  [facility] [address]           [facility]  [address]  [facility] [address]  Phone: [phone]  Fax: [phone]          Patient: Joyce Peterson   MR Number: [id number]   YOB: 1960   Date of Visit: 7/20/2018       Dear Dr. Isiah Moran:    Thank you for referring Joyce Peterson to me for evaluation. Attached you will find relevant portions of my assessment and plan of care.    If you have questions, please do not hesitate to call me. I look forward to following Joyce Peterson along with you.    Sincerely,    Oswaldo Hobbs PA-C    Enclosure  CC:  No Recipients    If you would like to receive this communication electronically, please contact [email or web address] or [phone] to request more information on [employer] access.    For providers and/or their staff who would like to refer a patient to [facility], please contact us through our one-stop-shop provider referral line, [facility], at [phone].    If you feel you have received this communication in error or would no longer like to receive these types of communications, please e-mail [email or web address]

## 2018-07-20 NOTE — PROGRESS NOTES
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  Previous Therapy: Previous OT for RA in hands and for tennis elbow in 2017     Physician: Meagan Stinson PA-C  Physician Orders: 2x/wk x 6 wks; Eval and treat, ROM (A/AA/P), modalities ok prn, HEP, pain control prior to strengthening  Date of Return to MD: 8/7/2018     Evaluation Date: 6/20/2018  Plan of Care Certification Period: 6/20/2018 - 8/1/2018     Visit #: 6 / Visits authorized: 50  Insurance Authorization period Expiration: 12/31/2018     Time In: 11:00 AM  Time Out: 12:00 PM  Total Billable Time: 60 minutes  Charges for this Visit: 1 MT, 1 US, 1 TE   Total Cost to Date: oop $3000. Met $1,097.64. Once met, covered 100%    Subjective     Pt reports/functional improvements: Pt reports that she believes her pain returns over night because she  sleeps with her elbow flexed.   Response to previous treatment: Pt was compliant with home exercise program given last session.   Pain Scale:  4-5/10 on VAS at start of session today                          Pain Location: Right olecranon / triceps area     Objective     Observation/Appearance: Left lateral elbow and olecranon bursa area appear slightly swollen.      Edema. Measured in centimeters.    6/20/2018 6/20/2018 7/13/2018       Left Right      Left     2in. Above elbow 23.5 24.8  24.0 (+.5)     2in. Below elbow 22.5 22.5  23.1 (+.6)     Over elbow 24.0 24.0  24.0 (=)        Elbow and Wrist ROM. Measured in degrees.    6/20/2018 6/20/2018 7/13/2018        Left Right  Left     Elbow Ext/Flex 2/151 0/153  0/140 (+2/-11)     Supination/Pronation 60/90 55/90  66/90 (+11/=)     Wrist Ext/Flex WNL WNL WNL      Wrist RD/UD WNL WNL  WNL         Strength (Dyanmometer) and Pinch Strength (Pinch Gauge)  Measured in pounds.    6/20/2018 7/13/208 7/13/2018             Left/Right Left  Right     Rung II   Elbow at 90*  To point of pain defer No increase in pain with max  N/A    Rung II   Elbow at 90*  Max  45 54    Rung II  Elbow extended  To point of pain  Increase in pain noted by holding dynamometer upsupported N/A    Rung II  Elbow extended  Max  32    52     Key Pinch    11 12     3pt Pinch    11  13     2pt Pinch    10  12        Sensation- Pt denies numbness and tingling.     CMS Impairment/Limitation/Restriction for FOTO Survey  Therapist reviewed FOTO scores for Joyce Peterson on 6/20/2018.   FOTO documents entered into [employer] - see Media section.     Intake Limitation Score: 57%  5th Visit Limitation Score: 57%  Category: Carrying     Current : CK = at least 40% but < 60% impaired, limited or restricted  Goal: CJ = at least 20% but < 40% impaired, limited or restricted  Discharge: TBD      Treatment     Joyce received the following supervised modalities after being cleared for contradictions for 15  minutes:   MHP applied to left elbow x 15 minutes for increased circulation and increased tissue elasticity prior to therapeutic exercises/activities.    Joyce received the following direct contact modalities after being cleared for contraindications for 14 minutes:  Pt received ultrasound at 3Mhz, 100% duty cycle, and 1.0 w/cm2 for 6 minutes to lateral epicondyle region and 8 minutes to the triceps insertion area for decreased pain and increased blood flow.    Joyce received the following manual therapy techniques for 10 minutes:   Deep friction massage to dorsal FA, lateral epicondyle region, and triceps insertion region x 8 min for increased blood flow and decreased pain.   Gentle passive stretch on wedge with elbow extended: wrist flexion and extension x 1 min hold x 2 reps each     Joyce received therapeutic exercises for 21 minutes including:  Pt then performed AROM elbow 3 ways, supination/pronation, and overhead tricep kickbacks x 20 reps each.  Wrist ext stretch and wrist flexion stretch with elbow straight x30 sec hold; x 3 reps each way  Triceps stretch x 30 sec hold x 5 reps   Wrist PREs 1# x 15 reps each way  Elbow PRE 2# x 15 reps each way   (All exercises performed within a pain-free range.)    Biofreeze applied to lateral epicondyle and triceps insertion region for pain relief. - NT    Issued size C Tubigrip for elbow and size D Tubigrip for FA for edema control and pain relief.     Home Exercises and Education Provided     Education provided: Pt encouraged to wrap elbow in a towel at night to prevent elbow flexion while sleeping.   - Progress towards goals   - Role of therapy, goals for therapy  No spiritual or educational barriers to learning provided    Written Home Exercises Provided: Continue previously issued HEP within a pain-free range. Add wrist ext stretch over pillow.   Exercises were reviewed and Joyce was able to demonstrate them prior to the end of the session.   Pt received a  written copy of exercises to perform at home (see patient instructions).    Joyce demonstrated good  understanding of the education provided.     Assessment     Joyce Peterson is a 57 y.o. female referred to outpatient occupational/hand therapy and presents with a medical diagnosis of Left lateral epicondylitis and left RCL Grade II sprain. Pt reports that she believes her pain returns over night because she sleeps with her elbow flexed. Pt encouraged to wrap elbow in a towel at night to prevent elbow flexion while sleeping.  Continued wrist and elbow PREs today with no c/o pain. Continued ultrasound today with good participation. Pt continues to exhibit functional deficits due to pain.    Joyce is progressing well towards her goals and there are no updates to goals at this time. Pt prognosis continues as Excellent. Pt will continue to benefit from skilled outpatient occupational therapy to address the deficits listed in the problem list on initial evaluation, provide pt/family education and to maximize pt's level of independence in the home and community environment.     Anticipated barriers to continued occupational therapy: none    Pt's spiritual, cultural and educational needs considered and pt agreeable to plan of care and goals.    Goals      Long Term Goals (LTGs); to be met by discharge.  LTG #1: Pt will report a pain level of 0 out of 10 at rest.  LTG #2: Pt will demo improved FOTO score by 19 points.   LTG #3: Pt will return to prior level of function for ADLs and household management.      Short Term Goals (STGs); to be met within 4 weeks (7/18/2018).  STG #1a: Pt will report 2 out of 10 pain level at rest. MET  STG #2a: Pt will report/demo [place] with normal hair routine frequency and technique. NOT ASSESSED  STG #2b: PT will report/demo moderate difficulty with carrying a shopping bag or briefcase. MET  STG #2a: Pt will demonstrate independence with issued HEP. MET  STG #3b: Pt will demo  improved left hand  strength equal to at least 80% of right dominant hand  strength NOT ASSESSED     Plan: Request updated POC next session.      Continue skilled occupational therapy with individualized plan of care 2 times per week for 6 weeks during the certification period from 6/20/2018 to 8/1/2018 to achieve the established goals.     Discussed Plan of Care with patient: Yes  Updates/Grading for next session: Advance strengthening if pain is decreased.     Delilah Addison, MOTS

## 2018-07-20 NOTE — PROGRESS NOTES
I have personally performed a face to face diagnostic evaluation on this patient. I have reviewed and agree with today's findings and the care plan outlined by Oswaldo Hobbs PA-C with following comments:    Mrs. Peterson is a very pleasant 57-year-old lady with history of long-standing rheumatoid arthritis on low-dose methotrexate and tofacitinib, who was referred to Hepatology for isolated elevation of G-GT. She has a recent abdominal ultrasound which did not show any biliary obstruction, it was essentially unremarkable for liver/biliary pathologies. Her alkaline phosphatase was normal. Patient denies significant alcohol use. GGT elevation can sometimes be associated with increased alcohol consumption. At this point, GGT elevation is of unclear etiology, it is trending down. Agree with checking mitochondrial antibody for the sake of thoroughness in work up. Agree with obtaining Fibroscan for liver stiffness measurements due to patient's increased risk of fibrosis secondary to prolonged MTX use.    The patient will return to Oswaldo Hobbs PA-C  for follow-up.     Deneen Russell MD   Hepatology  [facility]

## 2018-07-22 DIAGNOSIS — M06.9 RHEUMATOID ARTHRITIS, INVOLVING UNSPECIFIED SITE, UNSPECIFIED RHEUMATOID FACTOR PRESENCE: ICD-10-CM

## 2018-07-22 RX ORDER — LEFLUNOMIDE 20 MG/1
20 TABLET ORAL DAILY
Qty: 90 TABLET | Refills: 0 | Status: SHIPPED | OUTPATIENT
Start: 2018-07-22 | End: 2018-10-16 | Stop reason: SDUPTHER

## 2018-07-24 ENCOUNTER — CLINICAL SUPPORT (OUTPATIENT)
Dept: REHABILITATION | Facility: HOSPITAL | Age: 58
End: 2018-07-24
Payer: MEDICARE

## 2018-07-24 ENCOUNTER — PROCEDURE VISIT (OUTPATIENT)
Dept: HEPATOLOGY | Facility: CLINIC | Age: 58
End: 2018-07-24
Attending: PHYSICIAN ASSISTANT
Payer: MEDICARE

## 2018-07-24 DIAGNOSIS — M25.522 LEFT ELBOW PAIN: ICD-10-CM

## 2018-07-24 DIAGNOSIS — R74.8 ELEVATED SERUM GGT LEVEL: ICD-10-CM

## 2018-07-24 PROCEDURE — 97035 APP MDLTY 1+ULTRASOUND EA 15: CPT | Mod: PN

## 2018-07-24 PROCEDURE — 91200 LIVER ELASTOGRAPHY: CPT | Mod: S$GLB,,, | Performed by: PHYSICIAN ASSISTANT

## 2018-07-24 PROCEDURE — 97140 MANUAL THERAPY 1/> REGIONS: CPT | Mod: PN

## 2018-07-24 NOTE — PROGRESS NOTES
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  Previous Therapy: Previous OT for RA in hands and for tennis elbow in 2017     Physician: Meagan Stinson PA-C  Physician Orders: 2x/wk x 6 wks; Eval and treat, ROM (A/AA/P), modalities ok prn, HEP, pain control prior to strengthening  Date of Return to MD: 8/7/2018     Evaluation Date: 6/20/2018  Plan of Care Certification Period: 6/20/2018 - 8/1/2018     Visit #: 7 / Visits authorized: 50  Insurance Authorization period Expiration: 12/31/2018     Time In: 2:10 PM (Pt late today)  Time Out: 3:00 PM  Total Billable Time: 50 minutes (20 min 1:1)  Charges for this Visit: 1 MT, 1 US  Total Cost to Date: oop $3000. Met $1,097.64. Once met, covered 100%    Subjective     Pt reports/functional improvements: "It aches at night, but not like it had " "been."  Response to previous treatment: Pt was compliant with home exercise program given last session.   Pain Scale:  3/10 on VAS at start of session today                          Pain Location: Right olecranon / triceps area     Objective     Observation/Appearance: Left lateral elbow and olecranon bursa area appear slightly swollen.      Edema. Measured in centimeters.    6/20/2018 6/20/2018 7/13/2018       Left Right      Left     2in. Above elbow 23.5 24.8  24.0 (+.5)     2in. Below elbow 22.5 22.5  23.1 (+.6)     Over elbow 24.0 24.0  24.0 (=)        Elbow and Wrist ROM. Measured in degrees.    6/20/2018 6/20/2018 7/13/2018        Left Right  Left     Elbow Ext/Flex 2/151 0/153  0/140 (+2/-11)     Supination/Pronation 60/90 55/90  66/90 (+11/=)     Wrist Ext/Flex WNL WNL WNL      Wrist RD/UD WNL WNL  WNL         Strength (Dyanmometer) and Pinch Strength (Pinch Gauge)  Measured in pounds.    6/20/2018 7/13/208 7/13/2018             Left/Right Left  Right     Rung II   Elbow at 90*  To point of pain defer No increase in pain with max  N/A    Rung II   Elbow at 90*  Max  45 54    Rung II  Elbow extended  To point of pain  Increase in pain noted by holding dynamometer upsupported N/A    Rung II  Elbow extended  Max  32    52     Key Pinch    11 12     3pt Pinch    11  13     2pt Pinch    10  12        Sensation- Pt denies numbness and tingling.     CMS Impairment/Limitation/Restriction for FOTO Survey  Therapist reviewed FOTO scores for Joyce Peterson on 6/20/2018.   FOTO documents entered into [employer] - see Media section.     Intake Limitation Score: 57%  5th Visit Limitation Score: 57%  Category: Carrying     Current : CK = at least 40% but < 60% impaired, limited or restricted  Goal: CJ = at least 20% but < 40% impaired, limited or restricted  Discharge: TBD      Treatment     Joyce received the following supervised modalities after being cleared for contradictions for 15 minutes:   MHP applied to " left elbow x 15 minutes for increased circulation and increased tissue elasticity prior to therapeutic exercises/activities.    Joyce received the following direct contact modalities after being cleared for contraindications for 14 minutes:  Pt received ultrasound at 3Mhz, 100% duty cycle, and 1.0 w/cm2 for 6 minutes to lateral epicondyle region and 8 minutes to the triceps insertion area for decreased pain and increased blood flow.    Joyce received the following manual therapy techniques for 10 minutes:   Deep friction massage to dorsal FA, lateral epicondyle region, and triceps insertion region x 8 min for increased blood flow and decreased pain.   Gentle passive stretch on wedge with elbow extended: wrist flexion and extension x 1 min hold x 2 reps each     Joyce received therapeutic exercises for 11 minutes including:  Pt then performed AROM elbow 3 ways, supination/pronation, and overhead tricep kickbacks x 20 reps each.  Wrist ext stretch and wrist flexion stretch with elbow straight x30 sec hold; x 3 reps each way  Triceps stretch x 30 sec hold x 5 reps   Wrist PREs 1# x 30 reps each way  Elbow PREs 2# x 15 reps each way   Overhead Triceps 1# x 15 reps   Nirchels positions 1 and 2: Pink sponge squeezes x 10 reps each way    (All exercises performed within a pain-free range.)    Biofreeze applied to lateral epicondyle and triceps insertion region for pain relief. - NT  Issued size C Tubigrip for elbow and size D Tubigrip for FA for edema control and pain relief. -NT    Home Exercises and Education Provided     Education provided: None today.  - Progress towards goals   - Role of therapy, goals for therapy  No spiritual or educational barriers to learning provided    Written Home Exercises Provided: Continue previously issued HEP within a pain-free range.   Exercises were reviewed and Joyce was able to demonstrate them prior to the end of the session.   Pt received a written copy of exercises to perform at  home (see patient instructions).    Joyce demonstrated good  understanding of the education provided.     Assessment     Joyce Peterson is a 57 y.o. female referred to outpatient occupational/hand therapy and presents with a medical diagnosis of Left lateral epicondylitis and left RCL Grade II sprain. Pt reports decrease in pain today. Initiated overhead triceps strengthening with 1# and Nirchels positions 1 and 2 pink sponge gripping. Continued ultrasound today with good participation. Pt continues to exhibit functional deficits due to pain.    Joyce is progressing well towards her goals and there are no updates to goals at this time. Pt prognosis continues as Excellent. Pt will continue to benefit from skilled outpatient occupational therapy to address the deficits listed in the problem list on initial evaluation, provide pt/family education and to maximize pt's level of independence in the home and community environment.     Anticipated barriers to continued occupational therapy: none    Pt's spiritual, cultural and educational needs considered and pt agreeable to plan of care and goals.    Goals      Long Term Goals (LTGs); to be met by discharge.  LTG #1: Pt will report a pain level of 0 out of 10 at rest.  LTG #2: Pt will demo improved FOTO score by 19 points.   LTG #3: Pt will return to prior level of function for ADLs and household management.      Plan:     Continue skilled occupational therapy with individualized plan of care 2 times per week for 6 weeks during the certification period from 6/20/2018 to 8/1/2018 to achieve the established goals.     Requested Updated POC: Continue skilled occupational therapy with individualized plan of care 2 times per week for an additional 4 weeks during the certification period from 8/1/2018 to 8/29/2018 to achieve the established goals.     Discussed Plan of Care with patient: Yes  Updates/Grading for next session: Advance strengthening if pain is decreased.  Initiate UBE 2 min forward and 2 min backward at 120 and gripping     Delilah Nourse, MOTS

## 2018-07-24 NOTE — PLAN OF CARE
OCCUPATIONAL THERAPY UPDATED PLAN OF TREATMENT     Patient name: Joyce Peterson  Date: 07/24/2018  Physician: Meagan Stinson PA-C  Primary Diagnosis:   M77.12 (ICD-10-CM) - Lateral epicondylitis of left elbow  S59.902A (ICD-10-CM) - Injury of left elbow, initial encounter  Treatment Diagnosis:  Left elbow pain    Current Certification Period:  6/20/2018 to 8/1/2018    Objective:     Observation/Appearance: Left lateral elbow and olecranon bursa area appear slightly swollen.      Edema. Measured in centimeters.    6/20/2018 6/20/2018 7/13/2018       Left Right      Left     2in. Above elbow 23.5 24.8  24.0 (+.5)     2in. Below elbow 22.5 22.5  23.1 (+.6)     Over elbow 24.0 24.0  24.0 (=)        Elbow and Wrist ROM. Measured in degrees.    6/20/2018 6/20/2018 7/13/2018        Left Right  Left     Elbow Ext/Flex 2/151 0/153  0/140 (+2/-11)     Supination/Pronation 60/90 55/90  66/90 (+11/=)     Wrist Ext/Flex WNL WNL WNL      Wrist RD/UD WNL WNL  WNL         Strength (Dyanmometer) and Pinch Strength (Pinch Gauge)  Measured in pounds.    6/20/2018 7/13/208 7/13/2018             Left/Right Left  Right     Rung II   Elbow at 90*  To point of pain defer No increase in pain with max  N/A     Rung II   Elbow at 90*  Max   45 54     Rung II  Elbow extended  To point of pain   Increase in pain noted by holding dynamometer upsupported N/A     Rung II  Elbow extended  Max   32    52     Key Pinch    11 12     3pt Pinch    11  13     2pt Pinch    10  12        Updated Assessment:     Joyce Peterson is a 57 y.o. female referred to outpatient occupational/hand therapy and presents with a medical diagnosis of Left lateral epicondylitis and left RCL Grade II sprain. She has attended 7 OT visits as of 7/24/2018. Pt reports that her pain significantly decreases for 1 - 2 days following therapy sessions. Interventions have included ultrasound, edema control, manual therapy, and therapeutic exercises. Pt continues to  exhibit functional deficits due to pain and would benefit from continued occupational therapy to increase functional use of L UE.     Plans and Goals:     Requested Updated POC: Continue skilled occupational therapy with individualized plan of care 2 times per week for an additional 4 weeks during the certification period from 8/1/2018 to 8/29/2018 to achieve the established goals.     Previous Goals:   Long Term Goals (LTGs); to be met by discharge.  LTG #1: Pt will report a pain level of 0 out of 10 at rest.  LTG #2: Pt will demo improved FOTO score by 19 points.   LTG #3: Pt will return to prior level of function for ADLs and household management.      Short Term Goals (STGs); to be met within 4 weeks (7/18/2018).  STG #1a: Pt will report 2 out of 10 pain level at rest. MET  STG #2a: Pt will report/demo [place] with normal hair routine frequency and technique. NOT ASSESSED  STG #2b: PT will report/demo moderate difficulty with carrying a shopping bag or briefcase. MET  STG #2a: Pt will demonstrate independence with issued HEP. MET  STG #3b: Pt will demo improved left hand  strength equal to at least 80% of right dominant hand  strength NOT ASSESSED     New Goals:   Long Term Goals (LTGs); to be met by discharge.  LTG #1: Pt will report a pain level of 0 out of 10 at rest.  LTG #2: Pt will demo improved FOTO score by 19 points.   LTG #3: Pt will return to prior level of function for ADLs and household management.   LTG #4: Pt will demo improved left hand  strength equal to at least 80% of right dominant hand  strength   LTG #5:  Pt will report/demo [place] with normal hair routine frequency and technique.    Reasons for Recertification of Therapy:   Pt continues to exhibit functional deficits due to pain and would benefit from continued occupational therapy to increase functional use of L UE.     Recommended Treatment Plan: Therapeutic Exercise, Functional Activities, Patient Education,  Home Exercise Program, ADL Training, Ultrasound/Phonophoresis, Edema Control/Fluidotherapy, Moist Heat/Ice/Paraffin and Manual Therapy    Other recommendations:  None today.    Therapist's Name: SREEDHAR Mendez        Date: 07/24/2018     I CERTIFY THE NEED FOR THESE SERVICES FURNISHED UNDER THIS PLAN OF TREATMENT AND WHILE UNDER MY CARE    Physician's comments: ________________________________________________________________________________________________________________________________________________      Physician's Name (print): ___________________________________    Physician's Signature: _______________________________________________    Date: ________________________

## 2018-07-25 NOTE — PROCEDURES
Procedures   Fibroscan Procedure     Name: Joyce Peterson  Date of Procedure : 2018   :: Oswaldo Hobbs PA-C  Diagnosis: CRYPTOGENIC    Probe: M    Fibroscan reading: 3.4 KPa    Fibrosis:F 0-1     CAP readin dB/m    Steatosis: :<S1

## 2018-07-27 ENCOUNTER — TELEPHONE (OUTPATIENT)
Dept: ORTHOPEDICS | Facility: CLINIC | Age: 58
End: 2018-07-27

## 2018-07-28 NOTE — PROGRESS NOTES
Subjective:       Patient ID: Joyce Peterson is a 57 y.o. female.    Chief Complaint: Annual Exam and Establish Care    Patient is a 57 y.o.female who presents today for annual.    Cholesterol: due now  Vaccines: up to date  Eye exam: up to date; wears glasses  Mammogram: July 2018  Gyn exam: due next month; dr. Keshia alva  Colonoscopy: June 2017; repeat in ten years  DEXA: 2017      Constant postnasal drip., stuffy nose; dry cough , she cannot take too much antihistamine due to dry eyes and dry mouth.   Past Medical History:   Diagnosis Date    Acid reflux     Allergy     Anemia     Asthma     Coronary artery disease     Degenerative disc disease     Dry eyes     Dry mouth     Gastroparesis     Hyperlipidemia     Lateral meniscus derangement 4/6/2016    Lobular carcinoma in situ     Osteoarthritis     Osteoporosis     Rheumatoid arthritis(714.0)     Umbilical hernia 8/13/2015     Past Surgical History:   Procedure Laterality Date    BREAST BIOPSY Left 01/29/2002    core bx    CARPAL TUNNEL RELEASE Right 05/2017    CHOLECYSTECTOMY  2004    COLONOSCOPY      10/11    COLONOSCOPY N/A 6/29/2017    Procedure: COLONOSCOPY;  Surgeon: López Moore MD;  Location: [facility] ([address] [place] [address]);  Service: Endoscopy;  Laterality: N/A;    TONSILLECTOMY      TUBAL LIGATION  2003    UPPER GASTROINTESTINAL ENDOSCOPY      10/11    uterine ablation  2003     Social History     Social History    Marital status:      Spouse name: N/A    Number of children: N/A    Years of education: N/A     Occupational History    Not on file.     Social History Main Topics    Smoking status: Never Smoker    Smokeless tobacco: Never Used    Alcohol use Yes      Comment: occasionally    Drug use: No    Sexual activity: Yes     Partners: Male     Birth control/ protection: Surgical     Other Topics Concern    Not on file     Social History Narrative    No narrative on file     Review of patient's allergies  "indicates:   Allergen Reactions    Actemra [tocilizumab] Other (See Comments)     Severe dizziness    Codeine Nausea And Vomiting    Gold au 198 Hives and Rash    Hydroxychloroquine Other (See Comments)     Can't remember the reaction      Iodinated contrast- oral and iv dye Other (See Comments)     Other reaction(s): BURNING ALL OVER    Iodine Other (See Comments)     Other reaction(s): BURNING ALL OVER - Iodine dye - Not topical    Sulfa (sulfonamide antibiotics) Other (See Comments)     Can't remember the reaction    Zofran [ondansetron hcl (pf)] Nausea And Vomiting     Pt reports that last time she received zofran she started vomiting again    Methotrexate analogues Nausea Only    Pneumovax 23 [pneumococcal 23-argenis ps vaccine] Other (See Comments)     "sick"     Ms. Peterson had no medications administered during this visit.    Review of Systems   Constitutional: Negative for appetite change, chills, diaphoresis, fatigue and fever.   HENT: Negative for congestion, dental problem, ear discharge, ear pain, hearing loss, postnasal drip, sinus pressure and sore throat.    Eyes: Negative for discharge, redness and itching.   Respiratory: Negative for cough, chest tightness, shortness of breath and wheezing.    Cardiovascular: Negative for chest pain, palpitations and leg swelling.   Gastrointestinal: Negative for abdominal pain, constipation, diarrhea, nausea and vomiting.   Endocrine: Negative for cold intolerance and heat intolerance.   Genitourinary: Negative for difficulty urinating, frequency, hematuria and urgency.   Musculoskeletal: Negative for arthralgias, back pain, gait problem, myalgias and neck pain.   Skin: Negative for color change and rash.   Neurological: Negative for dizziness, syncope and headaches.   Hematological: Negative for adenopathy.   Psychiatric/Behavioral: Negative for behavioral problems and sleep disturbance. The patient is not nervous/anxious.        Objective:      Physical " Exam   Constitutional: She is oriented to person, place, and time. She appears well-developed and well-nourished. No distress.   HENT:   Head: Normocephalic and atraumatic.   Right Ear: External ear normal.   Left Ear: External ear normal.   Nose: Nose normal.   Mouth/Throat: Oropharynx is clear and moist. No oropharyngeal exudate.   Eyes: Conjunctivae and EOM are normal. Pupils are equal, round, and reactive to light. Right eye exhibits no discharge. Left eye exhibits no discharge. No scleral icterus.   Neck: Normal range of motion. Neck supple. No JVD present. No thyromegaly present.   Cardiovascular: Normal rate, regular rhythm, normal heart sounds and intact distal pulses.  Exam reveals no gallop and no friction rub.    No murmur heard.  Pulmonary/Chest: Effort normal and breath sounds normal. No stridor. No respiratory distress. She has no wheezes. She has no rales. She exhibits no tenderness.   Abdominal: Soft. Bowel sounds are normal. She exhibits no distension. There is no tenderness. There is no rebound.   Musculoskeletal: Normal range of motion. She exhibits no edema or tenderness.   Lymphadenopathy:     She has no cervical adenopathy.   Neurological: She is alert and oriented to person, place, and time. No cranial nerve deficit.   Skin: Skin is warm and dry. No rash noted. She is not diaphoretic. No erythema.   Psychiatric: She has a normal mood and affect. Her behavior is normal.   Nursing note and vitals reviewed.      Assessment and Plan:       1. Annual physical exam  - Lipid panel; Future  - Urinalysis; Future    2. Rheumatoid arthritis involving multiple sites, unspecified rheumatoid factor presence  - stable; sees rheum    3. Gastroesophageal reflux disease without esophagitis  - stable; sees GI    4. Pure hypercholesterolemia  - on crestor; check lipids    6. Postnasal drip  - on singulair; antihistamines dry her out too much; trial of astelin    7. Thrush  - trial of nystatin swish and spit           No Follow-up on file.

## 2018-07-30 ENCOUNTER — PATIENT MESSAGE (OUTPATIENT)
Dept: UROLOGY | Facility: CLINIC | Age: 58
End: 2018-07-30

## 2018-07-31 ENCOUNTER — OFFICE VISIT (OUTPATIENT)
Dept: ORTHOPEDICS | Facility: CLINIC | Age: 58
End: 2018-07-31
Payer: MEDICARE

## 2018-07-31 VITALS
SYSTOLIC BLOOD PRESSURE: 129 MMHG | BODY MASS INDEX: 29.02 KG/M2 | HEIGHT: 61 IN | HEART RATE: 102 BPM | WEIGHT: 153.69 LBS | DIASTOLIC BLOOD PRESSURE: 84 MMHG

## 2018-07-31 DIAGNOSIS — M77.12 LATERAL EPICONDYLITIS OF LEFT ELBOW: ICD-10-CM

## 2018-07-31 PROCEDURE — 99213 OFFICE O/P EST LOW 20 MIN: CPT | Mod: S$GLB,,, | Performed by: ORTHOPAEDIC SURGERY

## 2018-07-31 PROCEDURE — 99999 PR PBB SHADOW E&M-EST. PATIENT-LVL V: CPT | Mod: PBBFAC,,, | Performed by: ORTHOPAEDIC SURGERY

## 2018-07-31 PROCEDURE — 3008F BODY MASS INDEX DOCD: CPT | Mod: CPTII,S$GLB,, | Performed by: ORTHOPAEDIC SURGERY

## 2018-07-31 RX ADMIN — TRIAMCINOLONE ACETONIDE 80 MG: 40 INJECTION, SUSPENSION INTRA-ARTICULAR; INTRAMUSCULAR at 11:07

## 2018-07-31 NOTE — PROGRESS NOTES
"Subjective:      Patient ID: Joyce Peterson is a 57 y.o. female.    Chief Complaint: Pain of the Left Elbow      HPI  AT last visit:  Joyce Peterson is a 57 y.o. female presenting today for MRI follow of L elbow. Per history patient was helping her  carry a heavy fan when she heard and felt a "pop" in the left elbow 3 weeks ago . She had immediate severe pain in the elbow.Pt notes hx of left tennis elbow which began about 2 mos ago. She did have elbow pain from this prior to injury however pain has greatly increased and there is new elbow swelling.       She has been in a hinged elbow brace at 70 (unable to tolerate 90) for several weeks. She does not wear this at night. Reports pain and stiffness worsening with brace.     Today: Pain has improved but not gone. Now in compression sleeve- elbow strap made it worse. Pt complete 6 weeks of OT.    Review of patient's allergies indicates:   Allergen Reactions    Actemra [tocilizumab] Other (See Comments)     Severe dizziness    Codeine Nausea And Vomiting    Gold au 198 Hives and Rash    Hydroxychloroquine Other (See Comments)     Can't remember the reaction      Iodinated contrast- oral and iv dye Other (See Comments)     Other reaction(s): BURNING ALL OVER    Iodine Other (See Comments)     Other reaction(s): BURNING ALL OVER - Iodine dye - Not topical    Sulfa (sulfonamide antibiotics) Other (See Comments)     Can't remember the reaction    Zofran [ondansetron hcl (pf)] Nausea And Vomiting     Pt reports that last time she received zofran she started vomiting again    Methotrexate analogues Nausea Only    Pneumovax 23 [pneumococcal 23-argenis ps vaccine] Other (See Comments)     "sick"         Current Outpatient Prescriptions   Medication Sig Dispense Refill    albuterol 90 mcg/actuation inhaler Inhale 2 puffs into the lungs every 6 (six) hours as needed for Wheezing. 18 g 11    aspirin 81 mg Tab Take 81 mg by mouth every morning.       " azelastine (ASTELIN) 137 mcg nasal spray 1 spray (137 mcg total) by Nasal route 2 (two) times daily. (Patient taking differently: 1 spray by Nasal route 2 (two) times daily as needed. ) 30 mL 11    budesonide-formoterol 160-4.5 mcg (SYMBICORT) 160-4.5 mcg/actuation HFAA Inhale 2 puffs into the lungs every 12 (twelve) hours. 3 Inhaler 3    calcium citrate-vitamin D (CITRACAL + D) 315-200 mg-unit per tablet Take 1 tablet by mouth 2 (two) times daily.       fish oil-omega-3 fatty acids 300-1,000 mg capsule Take 1,400 g by mouth once daily.      flaxseed oil 1,000 mg Cap Take by mouth once daily.      fluticasone (FLONASE) 50 mcg/actuation nasal spray       folic acid (FOLVITE) 1 MG tablet Take 1 tablet (1 mg total) by mouth once daily. 90 tablet 1    GUAIFENESIN (MUCINEX ORAL) Take 400 mg by mouth every 4 (four) hours as needed.       INV PROPULSID 10 MG Take 20 mg by mouth 4 (four) times daily. FOR INVESTIGATIONAL USE ONLY. Protocol [id number] each 2    leflunomide (ARAVA) 20 MG Tab TAKE 1 TABLET (20 MG TOTAL) BY MOUTH ONCE DAILY. 90 tablet 0    methotrexate 25 mg/mL injection Inject 0.2 mLs (5 mg total) into the skin every 7 days. 2 mL 0    montelukast (SINGULAIR) 10 mg tablet Take 1 tablet (10 mg total) by mouth nightly. 90 tablet 3    naproxen (NAPROSYN) 500 MG tablet Take 1 tablet (500 mg total) by mouth 2 (two) times daily as needed. 180 tablet 1    omeprazole (PRILOSEC) 40 MG capsule TAKE ONE CAPSULE BY MOUTH EVERY MORNING 30 capsule 6    omeprazole-sodium bicarbonate (ZEGERID) 40-1.1 mg-gram per capsule Take 1 capsule by mouth every morning. 90 capsule 3    POTASSIUM ORAL Take by mouth once daily.      predniSONE (DELTASONE) 1 MG tablet Take 3 tablets (3 mg total) by mouth once daily. 270 tablet 1    progesterone (PROMETRIUM) 100 MG capsule Take 100 mg by mouth every morning. 1 Capsule Oral Every day, morning      RESTASIS 0.05 % ophthalmic emulsion       rizatriptan (MAXALT) 10 MG  "tablet Take 10 mg by mouth as needed for Migraine.       rosuvastatin (CRESTOR) 20 MG tablet TAKE 1 TABLET EVERY DAY 90 tablet 3    sucralfate (CARAFATE) 1 gram tablet Take 1 tablet (1 g total) by mouth 4 (four) times daily. 360 tablet 3    syringe with needle (TUBERCULIN SYRINGE) 1 mL 27 x 1/2" Syrg To administer methotrexate 7.5mg sc once a week 12 Syringe 3    tramadol (ULTRAM) 50 mg tablet TAKE 1 TABLET DAILY AS NEEDED PAIN 30 tablet 0    trospium (SANCTURA) 20 mg Tab tablet Take 1 tablet (20 mg total) by mouth 2 (two) times daily. 60 tablet 11    valACYclovir (VALTREX) 1000 MG tablet TAKE 1 TABLET BY MOUTH TWICE A DAY AS NEEDED 20 tablet 3    XELJANZ XR 11 mg Tb24 TAKE ONE TABLET (11 MG) BY MOUTH ONCE DAILY. MAY BE TAKEN WITH OR WITHOUT FOOD. SWALLOW TABLET WHOLE. DO NOT CRUSH, SPLIT OR CHEW. STORE AT 90 tablet 0    conjugated estrogens (PREMARIN) vaginal cream Place 0.5 g vaginally 3 (three) times a week. 30 g 3     No current facility-administered medications for this visit.        Past Medical History:   Diagnosis Date    Acid reflux     Allergy     Anemia     Asthma     Coronary artery disease     Degenerative disc disease     Dry eyes     Dry mouth     Gastroparesis     Hyperlipidemia     Lateral meniscus derangement 4/6/2016    Lobular carcinoma in situ     Osteoarthritis     Osteoporosis     Rheumatoid arthritis(714.0)     Umbilical hernia 8/13/2015       Past Surgical History:   Procedure Laterality Date    BREAST BIOPSY Left 01/29/2002    core bx    CARPAL TUNNEL RELEASE Right 05/2017    CHOLECYSTECTOMY  2004    COLONOSCOPY      10/11    COLONOSCOPY N/A 6/29/2017    Procedure: COLONOSCOPY;  Surgeon: López Moore MD;  Location: [facility] ([address] [place] [address]);  Service: Endoscopy;  Laterality: N/A;    TONSILLECTOMY      TUBAL LIGATION  2003    UPPER GASTROINTESTINAL ENDOSCOPY      10/11    uterine ablation  2003       Review of Systems:  Constitutional: Negative for chills and " "fever.   Respiratory: Negative for cough and shortness of breath.    Gastrointestinal: Negative for nausea and vomiting.   Skin: Negative for rash.   Neurological: Negative for dizziness and headaches.   Psychiatric/Behavioral: Negative for depression.   MSK as in HPI       OBJECTIVE:     PHYSICAL EXAM:  /84   Pulse 102   Ht 5' 1" (1.549 m)   Wt 69.7 kg (153 lb 10.6 oz)   LMP  (LMP Unknown)   BMI 29.03 kg/m²     GEN:  NAD, well-developed, well-groomed.  NEURO: Awake, alert, and oriented. Normal attention and concentration.    PSYCH: Normal mood and affect. Behavior is normal.  HEENT: No cervical lymphadenopathy noted.  CARDIOVASCULAR: Radial pulses 2+ bilaterally. No LE edema noted.  PULMONARY: Breath sounds normal. No respiratory distress.  SKIN: Intact, no rashes.      MSK:   LUE:  Good active ROM of the wrist and fingers. Good elbow motion  TTP lateral epicondyle and olecranon  No varus instability noted on exam  + pain with resisted wrist extension  AIN/PIN/Radial/Median/Ulnar Nerves assessed in isolation without deficit.  Radial & Ulnar arteries palpated 2+.   Capillary Refill <3s.      RADIOGRAPHS:  Xray left elbow 6/4/18  FINDINGS:  No acute fracture.  Degenerative osteophyte spurring is present the radial head.  There is mild soft tissue prominence overlying the olecranon consistent with olecranon bursitis.  No large joint effusion.    Comments: I have personally reviewed the imaging and I agree with the above radiologist's report.    MRI: partial tear of ECRB tendon, RCL tear    ASSESSMENT/PLAN:     No diagnosis found.        Plan:   -Pain seems to be 2/2 lateral epicondylitis rather than acute RCL tear  - COnt OT  "

## 2018-07-31 NOTE — PROCEDURES
Large Joint Aspiration/Injection  Date/Time: 7/31/2018 11:39 AM  Performed by: RACHEL SHETH  Authorized by: RACHEL SHETH     Consent Done?:  Yes (Verbal)  Indications:  Pain  Timeout: Prior to procedure the correct patient, procedure, and site was verified      Location:  Shoulder  Site:  R subacromial bursa  Prep: Patient was prepped and draped in usual sterile fashion    Needle size:  22 G  Approach:  Posterior  Medications:  80 mg triamcinolone acetonide 40 mg/mL

## 2018-08-02 ENCOUNTER — OFFICE VISIT (OUTPATIENT)
Dept: INTERNAL MEDICINE | Facility: CLINIC | Age: 58
End: 2018-08-02
Payer: MEDICARE

## 2018-08-02 VITALS
DIASTOLIC BLOOD PRESSURE: 68 MMHG | RESPIRATION RATE: 15 BRPM | WEIGHT: 152.13 LBS | HEART RATE: 98 BPM | BODY MASS INDEX: 28.74 KG/M2 | TEMPERATURE: 98 F | SYSTOLIC BLOOD PRESSURE: 116 MMHG

## 2018-08-02 DIAGNOSIS — Z00.00 ANNUAL PHYSICAL EXAM: Primary | ICD-10-CM

## 2018-08-02 DIAGNOSIS — E78.00 PURE HYPERCHOLESTEROLEMIA: ICD-10-CM

## 2018-08-02 DIAGNOSIS — M06.9 RHEUMATOID ARTHRITIS INVOLVING MULTIPLE SITES, UNSPECIFIED RHEUMATOID FACTOR PRESENCE: ICD-10-CM

## 2018-08-02 DIAGNOSIS — K21.9 GASTROESOPHAGEAL REFLUX DISEASE WITHOUT ESOPHAGITIS: ICD-10-CM

## 2018-08-02 DIAGNOSIS — B37.0 THRUSH: ICD-10-CM

## 2018-08-02 DIAGNOSIS — R09.82 POSTNASAL DRIP: ICD-10-CM

## 2018-08-02 PROCEDURE — 99214 OFFICE O/P EST MOD 30 MIN: CPT | Mod: S$GLB,,, | Performed by: INTERNAL MEDICINE

## 2018-08-02 PROCEDURE — 99999 PR PBB SHADOW E&M-EST. PATIENT-LVL III: CPT | Mod: PBBFAC,,, | Performed by: INTERNAL MEDICINE

## 2018-08-02 RX ORDER — NYSTATIN 100000 [USP'U]/ML
4 SUSPENSION ORAL 4 TIMES DAILY
Qty: 180 ML | Refills: 0 | Status: SHIPPED | OUTPATIENT
Start: 2018-08-02 | End: 2018-08-12

## 2018-08-02 RX ORDER — AZELASTINE 1 MG/ML
1 SPRAY, METERED NASAL 2 TIMES DAILY
Qty: 30 ML | Refills: 0 | Status: SHIPPED | OUTPATIENT
Start: 2018-08-02 | End: 2018-10-16 | Stop reason: CLARIF

## 2018-08-03 RX ORDER — TRIAMCINOLONE ACETONIDE 40 MG/ML
80 INJECTION, SUSPENSION INTRA-ARTICULAR; INTRAMUSCULAR
Status: DISCONTINUED | OUTPATIENT
Start: 2018-07-31 | End: 2018-08-03 | Stop reason: HOSPADM

## 2018-08-06 ENCOUNTER — PATIENT MESSAGE (OUTPATIENT)
Dept: UROLOGY | Facility: CLINIC | Age: 58
End: 2018-08-06

## 2018-08-07 ENCOUNTER — HOSPITAL ENCOUNTER (OUTPATIENT)
Dept: ENDOCRINOLOGY | Facility: CLINIC | Age: 58
Discharge: HOME | End: 2018-08-07
Attending: INTERNAL MEDICINE
Payer: MEDICARE

## 2018-08-07 ENCOUNTER — PATIENT MESSAGE (OUTPATIENT)
Dept: INTERNAL MEDICINE | Facility: CLINIC | Age: 58
End: 2018-08-07

## 2018-08-07 ENCOUNTER — PATIENT MESSAGE (OUTPATIENT)
Dept: ENDOCRINOLOGY | Facility: CLINIC | Age: 58
End: 2018-08-07

## 2018-08-07 ENCOUNTER — LAB VISIT (OUTPATIENT)
Dept: LAB | Facility: HOSPITAL | Age: 58
End: 2018-08-07
Attending: INTERNAL MEDICINE
Payer: MEDICARE

## 2018-08-07 DIAGNOSIS — M06.9 RHEUMATOID ARTHRITIS OF HAND, UNSPECIFIED LATERALITY, UNSPECIFIED RHEUMATOID FACTOR PRESENCE: ICD-10-CM

## 2018-08-07 DIAGNOSIS — E04.1 THYROID NODULE: ICD-10-CM

## 2018-08-07 DIAGNOSIS — E05.80 OTHER THYROTOXICOSIS WITHOUT THYROTOXIC CRISIS OR STORM: ICD-10-CM

## 2018-08-07 DIAGNOSIS — Z00.00 ANNUAL PHYSICAL EXAM: ICD-10-CM

## 2018-08-07 DIAGNOSIS — E21.3 HYPERPARATHYROIDISM: ICD-10-CM

## 2018-08-07 DIAGNOSIS — Z79.631 METHOTREXATE, LONG TERM, CURRENT USE: ICD-10-CM

## 2018-08-07 DIAGNOSIS — E05.80 IATROGENIC THYROTOXICOSIS: ICD-10-CM

## 2018-08-07 LAB
25(OH)D3+25(OH)D2 SERPL-MCNC: 38 NG/ML
ALBUMIN SERPL BCP-MCNC: 3.7 G/DL
ALBUMIN SERPL BCP-MCNC: 3.7 G/DL
ALP SERPL-CCNC: 78 U/L
ALT SERPL W/O P-5'-P-CCNC: 22 U/L
ANION GAP SERPL CALC-SCNC: 8 MMOL/L
ANION GAP SERPL CALC-SCNC: 8 MMOL/L
AST SERPL-CCNC: 16 U/L
BASOPHILS # BLD AUTO: 0.03 K/UL
BASOPHILS NFR BLD: 0.4 %
BILIRUB SERPL-MCNC: 0.5 MG/DL
BUN SERPL-MCNC: 16 MG/DL
BUN SERPL-MCNC: 16 MG/DL
CALCIUM SERPL-MCNC: 9.3 MG/DL
CALCIUM SERPL-MCNC: 9.3 MG/DL
CHLORIDE SERPL-SCNC: 106 MMOL/L
CHLORIDE SERPL-SCNC: 106 MMOL/L
CHOLEST SERPL-MCNC: 209 MG/DL
CHOLEST/HDLC SERPL: 2.1 {RATIO}
CO2 SERPL-SCNC: 26 MMOL/L
CO2 SERPL-SCNC: 26 MMOL/L
CREAT SERPL-MCNC: 0.8 MG/DL
CREAT SERPL-MCNC: 0.8 MG/DL
CRP SERPL-MCNC: 0.3 MG/L
DIFFERENTIAL METHOD: ABNORMAL
EOSINOPHIL # BLD AUTO: 0 K/UL
EOSINOPHIL NFR BLD: 0.1 %
ERYTHROCYTE [DISTWIDTH] IN BLOOD BY AUTOMATED COUNT: 13.5 %
ERYTHROCYTE [SEDIMENTATION RATE] IN BLOOD BY WESTERGREN METHOD: 3 MM/HR
EST. GFR  (AFRICAN AMERICAN): >60 ML/MIN/1.73 M^2
EST. GFR  (AFRICAN AMERICAN): >60 ML/MIN/1.73 M^2
EST. GFR  (NON AFRICAN AMERICAN): >60 ML/MIN/1.73 M^2
EST. GFR  (NON AFRICAN AMERICAN): >60 ML/MIN/1.73 M^2
GLUCOSE SERPL-MCNC: 105 MG/DL
GLUCOSE SERPL-MCNC: 105 MG/DL
HCT VFR BLD AUTO: 41.1 %
HDLC SERPL-MCNC: 98 MG/DL
HDLC SERPL: 46.9 %
HGB BLD-MCNC: 13.8 G/DL
IMM GRANULOCYTES # BLD AUTO: 0.03 K/UL
IMM GRANULOCYTES NFR BLD AUTO: 0.4 %
LDLC SERPL CALC-MCNC: 83.2 MG/DL
LYMPHOCYTES # BLD AUTO: 1.1 K/UL
LYMPHOCYTES NFR BLD: 13.3 %
MCH RBC QN AUTO: 31.2 PG
MCHC RBC AUTO-ENTMCNC: 33.6 G/DL
MCV RBC AUTO: 93 FL
MONOCYTES # BLD AUTO: 0.8 K/UL
MONOCYTES NFR BLD: 9.4 %
NEUTROPHILS # BLD AUTO: 6.4 K/UL
NEUTROPHILS NFR BLD: 76.4 %
NONHDLC SERPL-MCNC: 111 MG/DL
NRBC BLD-RTO: 0 /100 WBC
PHOSPHATE SERPL-MCNC: 3 MG/DL
PLATELET # BLD AUTO: 323 K/UL
PMV BLD AUTO: 10.2 FL
POTASSIUM SERPL-SCNC: 3.9 MMOL/L
POTASSIUM SERPL-SCNC: 3.9 MMOL/L
PROT SERPL-MCNC: 6.6 G/DL
PTH-INTACT SERPL-MCNC: 89 PG/ML
RBC # BLD AUTO: 4.43 M/UL
SODIUM SERPL-SCNC: 140 MMOL/L
SODIUM SERPL-SCNC: 140 MMOL/L
TRIGL SERPL-MCNC: 139 MG/DL
TSH SERPL DL<=0.005 MIU/L-ACNC: 0.5 UIU/ML
TSH SERPL DL<=0.005 MIU/L-ACNC: 0.5 UIU/ML
WBC # BLD AUTO: 8.41 K/UL

## 2018-08-07 PROCEDURE — 80061 LIPID PANEL: CPT

## 2018-08-07 PROCEDURE — 80053 COMPREHEN METABOLIC PANEL: CPT

## 2018-08-07 PROCEDURE — 85025 COMPLETE CBC W/AUTO DIFF WBC: CPT

## 2018-08-07 PROCEDURE — 83970 ASSAY OF PARATHORMONE: CPT

## 2018-08-07 PROCEDURE — 76536 US EXAM OF HEAD AND NECK: CPT | Mod: S$GLB,,, | Performed by: INTERNAL MEDICINE

## 2018-08-07 PROCEDURE — 82306 VITAMIN D 25 HYDROXY: CPT

## 2018-08-07 PROCEDURE — 85651 RBC SED RATE NONAUTOMATED: CPT

## 2018-08-07 PROCEDURE — 36415 COLL VENOUS BLD VENIPUNCTURE: CPT

## 2018-08-07 PROCEDURE — 84443 ASSAY THYROID STIM HORMONE: CPT

## 2018-08-07 PROCEDURE — 80069 RENAL FUNCTION PANEL: CPT

## 2018-08-07 PROCEDURE — 86140 C-REACTIVE PROTEIN: CPT

## 2018-08-07 NOTE — TELEPHONE ENCOUNTER
Results for orders placed or performed in visit on 08/07/18   CBC auto differential   Result Value Ref Range    WBC 8.41 3.90 - 12.70 K/uL    RBC 4.43 4.00 - 5.40 M/uL    Hemoglobin 13.8 12.0 - 16.0 g/dL    Hematocrit 41.1 37.0 - 48.5 %    MCV 93 82 - 98 fL    MCH 31.2 (H) 27.0 - 31.0 pg    MCHC 33.6 32.0 - 36.0 g/dL    RDW 13.5 11.5 - 14.5 %    Platelets 323 150 - 350 K/uL    MPV 10.2 9.2 - 12.9 fL    Immature Granulocytes 0.4 0.0 - 0.5 %    Gran # (ANC) 6.4 1.8 - 7.7 K/uL    Immature Grans (Abs) 0.03 0.00 - 0.04 K/uL    Lymph # 1.1 1.0 - 4.8 K/uL    Mono # 0.8 0.3 - 1.0 K/uL    Eos # 0.0 0.0 - 0.5 K/uL    Baso # 0.03 0.00 - 0.20 K/uL    nRBC 0 0 /100 WBC    Gran% 76.4 (H) 38.0 - 73.0 %    Lymph% 13.3 (L) 18.0 - 48.0 %    Mono% 9.4 4.0 - 15.0 %    Eosinophil% 0.1 0.0 - 8.0 %    Basophil% 0.4 0.0 - 1.9 %    Differential Method Automated    Comprehensive metabolic panel   Result Value Ref Range    Sodium 140 136 - 145 mmol/L    Potassium 3.9 3.5 - 5.1 mmol/L    Chloride 106 95 - 110 mmol/L    CO2 26 23 - 29 mmol/L    Glucose 105 70 - 110 mg/dL    BUN, Bld 16 6 - 20 mg/dL    Creatinine 0.8 0.5 - 1.4 mg/dL    Calcium 9.3 8.7 - 10.5 mg/dL    Total Protein 6.6 6.0 - 8.4 g/dL    Albumin 3.7 3.5 - 5.2 g/dL    Total Bilirubin 0.5 0.1 - 1.0 mg/dL    Alkaline Phosphatase 78 55 - 135 U/L    AST 16 10 - 40 U/L    ALT 22 10 - 44 U/L    Anion Gap 8 8 - 16 mmol/L    eGFR if African American >60.0 >60 mL/min/1.73 m^2    eGFR if non African American >60.0 >60 mL/min/1.73 m^2   C-reactive protein   Result Value Ref Range    CRP 0.3 0.0 - 8.2 mg/L   TSH   Result Value Ref Range    TSH 0.504 0.400 - 4.000 uIU/mL   PTH, intact   Result Value Ref Range    PTH, Intact 89.0 (H) 9.0 - 77.0 pg/mL   Renal function panel   Result Value Ref Range    Glucose 105 70 - 110 mg/dL    Sodium 140 136 - 145 mmol/L    Potassium 3.9 3.5 - 5.1 mmol/L    Chloride 106 95 - 110 mmol/L    CO2 26 23 - 29 mmol/L    BUN, Bld 16 6 - 20 mg/dL    Calcium 9.3  8.7 - 10.5 mg/dL    Creatinine 0.8 0.5 - 1.4 mg/dL    Albumin 3.7 3.5 - 5.2 g/dL    Phosphorus 3.0 2.7 - 4.5 mg/dL    eGFR if African American >60.0 >60 mL/min/1.73 m^2    eGFR if non African American >60.0 >60 mL/min/1.73 m^2    Anion Gap 8 8 - 16 mmol/L   Vitamin D   Result Value Ref Range    Vit D, 25-Hydroxy 38 30 - 96 ng/mL   TSH   Result Value Ref Range    TSH 0.504 0.400 - 4.000 uIU/mL   Lipid panel   Result Value Ref Range    Cholesterol 209 (H) 120 - 199 mg/dL    Triglycerides 139 30 - 150 mg/dL    HDL 98 (H) 40 - 75 mg/dL    LDL Cholesterol 83.2 63.0 - 159.0 mg/dL    HDL/Chol Ratio 46.9 20.0 - 50.0 %    Total Cholesterol/HDL Ratio 2.1 2.0 - 5.0    Non-HDL Cholesterol 111 mg/dL     *Note: Due to a large number of results and/or encounters for the requested time period, some results have not been displayed. A complete set of results can be found in Results Review.     Normal vitamin D levels   PTH has declined to 89 (from 89-120s)  TFTs normal     Osteopenia on E/P - OK to continue   Declined -6%  Repeat scan 2019    PTH ranging between high normal and mildly elevated   With normal serum calcium

## 2018-08-08 RX ORDER — NAPROXEN 500 MG/1
TABLET ORAL
Qty: 180 TABLET | Refills: 1 | Status: SHIPPED | OUTPATIENT
Start: 2018-08-08 | End: 2019-03-07 | Stop reason: SDUPTHER

## 2018-08-08 RX ORDER — FOLIC ACID 1 MG/1
TABLET ORAL
Qty: 90 TABLET | Refills: 3 | Status: SHIPPED | OUTPATIENT
Start: 2018-08-08 | End: 2019-01-14

## 2018-08-09 ENCOUNTER — CLINICAL SUPPORT (OUTPATIENT)
Dept: REHABILITATION | Facility: HOSPITAL | Age: 58
End: 2018-08-09
Payer: MEDICARE

## 2018-08-09 DIAGNOSIS — M25.522 LEFT ELBOW PAIN: ICD-10-CM

## 2018-08-09 PROCEDURE — 97035 APP MDLTY 1+ULTRASOUND EA 15: CPT | Mod: PN

## 2018-08-09 PROCEDURE — 97110 THERAPEUTIC EXERCISES: CPT | Mod: PN

## 2018-08-09 NOTE — PROGRESS NOTES
"  Occupational Therapy Daily Treatment Note      Date: 8/9/2018  Name: Joyce Peterson  Clinic Number: [id number]    Medical Diagnosis: Left Lateral Epicondylosis, partial ECRB tear, Grade II RCL sprain, partial triceps tear vs tendinosis, olecranon bursitis; Right subacromial bursitis   Date of Onset: 2 months ago for lateral epicondylitis; 3 weeks ago for elbow injury  Date of Surgery: N/A  Surgical Procedure: N/A  Therapy Diagnosis:        Encounter Diagnosis   Name Primary?   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  Previous Therapy: Previous OT for RA in hands and for tennis elbow in 2017     Physician: Meagan Stinson PA-C  Physician Orders: 2x/wk x 6 wks; Eval and treat, ROM (A/AA/P), modalities ok prn, HEP, pain control prior to strengthening  Date of Return to MD: 8/7/2018     Evaluation Date: 6/20/2018  Plan of Care Certification Period: 6/20/2018 - 8/1/2018     Visit #: 8 / Visits authorized: 50  Insurance Authorization period Expiration: 12/31/2018     Time In: 11:00am  Time Out: 12:10pm  Total Billable Time: 70min  Charges for this Visit: US x 1, TE x 3  Total Cost to Date: oop $3000. Met $1,097.64. Once met, covered 100%    Subjective     Pt reports/functional improvements: "My shoulder is bothering me more than my elbow today." Patient reported that she received steroid injection to right shoulder last week.  Response to previous treatment: Pt was compliant with home exercise program given last session.   Pain Scale:  3/10 on VAS at left elbow; " 4/10 on VAS at right shoulder                Objective     Observation/Appearance: Left lateral elbow and olecranon bursa area appear slightly swollen.        Edema. Measured in centimeters.    6/20/2018 6/20/2018 7/13/2018     Left Right      Left   2in. Above elbow 23.5 24.8  24.0 (+.5)   2in. Below elbow 22.5 22.5  23.1 (+.6)   Over elbow 24.0 24.0  24.0 (=)          Shoulder ROM. Measured in degrees.    8/9/2018 8/9/2018     Left Right    Shoulder Ext 60 60   Shoulder Flex  155 155   Shoulder  155   Shoulder IR  At 90* 66 75   Shoulder ER   At 90* 84 75         Elbow and Wrist ROM. Measured in degrees.    6/20/2018 6/20/2018 7/13/2018        Left Right  Left     Elbow Ext/Flex 2/151 0/153  0/140 (+2/-11)     Supination/Pronation 60/90 55/90  66/90 (+11/=)     Wrist Ext/Flex WNL WNL WNL      Wrist RD/UD WNL WNL  WNL           Strength (Dyanmometer) and Pinch Strength (Pinch Gauge)  Measured in pounds.    6/20/2018 7/13/208 7/13/2018 8/9/2018        Left/Right Left  Right  Left   Rung II   Elbow at 90*  To point of pain defer No increase in pain with max  N/A    Rung II   Elbow at 90*  Max  45 54 49   Rung II  Elbow extended  To point of pain  Increase in pain noted by holding dynamometer upsupported N/A    Rung II  Elbow extended  Max  32    52 35    Key Pinch    11 12     3pt Pinch    11  13     2pt Pinch    10  12            Manual Muscle Test   8/9/2018 8/9/2018    Left Right   Shoulder Extension  4 4   Shoulder Flexion  5 5   Shoulder ABD 5 3+   Shoulder IR 3+ 4   Shoulder ER 3 3   Elbow Extension 3 4   Elbow Flexion 4 4          Elbow --- CMS Impairment/Limitation/Restriction for FOTO Survey  Therapist reviewed FOTO scores for Joyce Peterson  FOTO documents entered into EPIC - see Media section.     Intake Limitation Score: 57%  5th Visit Limitation Score: 57%  Category: Carrying     Current : CK = at least 40% but < 60% impaired, limited or restricted  Goal: CJ = at least 20% but <  40% impaired, limited or restricted  Discharge: TBD      Shoulder --- CMS Impairment/Limitation/Restriction for FOTO Survey  Therapist reviewed FOTO scores for Joyce Peterson  FOTO documents entered into EPIC - see Media section.     Intake Limitation Score: 53%    Category: Carrying     Current : CK = at least 40% but < 60% impaired, limited or restricted  Goal: CJ = at least 20% but < 40% impaired, limited or restricted  Discharge: TBD       Treatment     Joyce received the following supervised modalities after being cleared for contradictions for 10 minutes:   MHP applied to left elbow and right shoulder for increased circulation and increased tissue elasticity prior to therapeutic exercises/activities.    Joyce received the following direct contact modalities after being cleared for contraindications for 14 minutes:  Pt received ultrasound at 3Mhz, 100% duty cycle, and 1.0 w/cm2 for 6 minutes to lateral epicondyle region and 8 minutes to the triceps insertion area for decreased pain and increased blood flow.    Joyce received the following manual therapy techniques for 5 minutes:   Deep friction massage to dorsal FA, lateral epicondyle region, and triceps insertion region for increased blood flow and decreased pain.   Gentle passive stretch on wedge with elbow extended: wrist flexion and extension x 1 min hold x 2 reps each     Joyce received therapeutic exercises for 40 minutes including:  Pt then performed AROM elbow 3 ways, supination/pronation, and overhead tricep kickbacks x 20 reps each.  Wrist ext stretch and wrist flexion stretch with elbow straight x30 sec hold; x 3 reps each way  Triceps stretch x 30 sec hold x 5 reps   Wrist PREs 1# x 30 reps each way  Elbow PREs 2# x 15 reps each way   Overhead Triceps 1# x 15 reps   Nirchels positions 1 and 2: Pink sponge squeezes x 10 reps each way - not today 2* time   Red TB rows x 15 reps each - rows, lat pulls, ER side steps, IR by side  Corner Pec  stretch x 2 sets of 30 sec holds   Biceps Wall stretch x2 sets of 30 sec holds     Home Exercises and Education Provided     Education provided: None today.  - Progress towards goals   - Role of therapy, goals for therapy  No spiritual or educational barriers to learning provided    Written Home Exercises Provided: Continue previously issued HEP within a pain-free range for elbow. Initiated shoulder HEP.   Exercises were reviewed and Joyce was able to demonstrate them prior to the end of the session.   Pt received a written copy of exercises to perform at home (see media.)    Joyce demonstrated good  understanding of the education provided.     Assessment     See updated POC.     Goals      See updated POC.     Discussed Plan of Care with patient: Yes  Updates/Grading for next session: Advance as tolerated     Kymberly Rodriguez, RAJEEV, JESSE, CHT

## 2018-08-09 NOTE — PLAN OF CARE
OCCUPATIONAL THERAPY UPDATED PLAN OF TREATMENT     Patient name: Joyce Peterson  Date: 08/09/2018  Physician: Staci Yarbrough, *  Primary Diagnosis:  Medical Diagnosis: Left Lateral Epicondylosis, partial ECRB tear, Grade II RCL sprain, partial triceps tear vs tendinosis, olecranon bursitis; Right subacromial bursitis   Treatment Diagnosis:  Left elbow pain, Right shoulder pain  Certification Period:  8/9/2018 to 9/28/2018      Objective:     Shoulder ROM. Measured in degrees.    8/9/2018 8/9/2018     Left Right    Shoulder Ext 60 60   Shoulder Flex  155 155   Shoulder  155   Shoulder IR  At 90* 66 75   Shoulder ER   At 90* 84 75           Elbow and Wrist ROM. Measured in degrees.    6/20/2018 6/20/2018 7/13/2018      Left Right  Left   Elbow Ext/Flex 2/151 0/153  0/140 (+2/-11)   Supination/Pronation 60/90 55/90  66/90 (+11/=)   Wrist Ext/Flex WNL WNL WNL    Wrist RD/UD WNL WNL  WNL          Strength (Dyanmometer) and Pinch Strength (Pinch Gauge)  Measured in pounds.    7/13/208 7/13/2018 8/9/2018        Left  Right  Left   Rung II   Elbow at 90*  To point of pain No increase in pain with max  N/A     Rung II   Elbow at 90*  Max 45 54 49   Rung II  Elbow extended  To point of pain Increase in pain noted by holding dynamometer upsupported N/A     Rung II  Elbow extended  Max 32    52 35    Key Pinch  11 12     3pt Pinch  11  13     2pt Pinch  10  12              Manual Muscle Test    8/9/2018 8/9/2018     Left Right   Shoulder Extension  4 4   Shoulder Flexion  5 5   Shoulder ABD 5 3+   Shoulder IR 3+ 4   Shoulder ER 3 3   Elbow Extension 3 4   Elbow Flexion 4 4            Elbow --- CMS Impairment/Limitation/Restriction for FOTO Survey  Therapist reviewed FOTO scores for Joyce Peterson  FOTO documents entered into [facility] - see Media section.     Intake Limitation Score: 57%  5th Visit Limitation Score: 57%  Category: Carrying     Current : CK = at least 40% but < 60% impaired, limited  or restricted  Goal: CJ = at least 20% but < 40% impaired, limited or restricted  Discharge: TBD      Shoulder --- CMS Impairment/Limitation/Restriction for FOTO Survey  Therapist reviewed FOTO scores for Joyce Peterson  FOTO documents entered into [facility] - see Media section.     Intake Limitation Score: 53%     Category: Carrying     Current : CK = at least 40% but < 60% impaired, limited or restricted  Goal: CJ = at least 20% but < 40% impaired, limited or restricted  Discharge: TBD         Updated Assessment:     Joyce Peterson is a 57 y.o. female referred to outpatient occupational/hand therapy and presents with a medical diagnosis of Left lateral epicondylitis and left RCL Grade II sprain. Pt reports decrease in pain today. Initiated right shoulder OT today per new referral to add body part. Right shoulder appears to have subacromial bursitis and RC tendinitis with resisted ER as most painful activity.      Joyce is progressing well towards her goals and upgrades were made to include focus on shoulder. Plan of care also updated as duration expectation is longer with additional body part. Pt prognosis continues as Excellent. Pt will continue to benefit from skilled outpatient occupational therapy to address the deficits listed in the problem list on initial evaluation, provide pt/family education and to maximize pt's level of independence in the home and community environment.      Anticipated barriers to continued occupational therapy: none     Pt's spiritual, cultural and educational needs considered and pt agreeable to plan of care and goals.      Plans and Goals:     Continue skilled occupational therapy with individualized plan of care 2 times per week to 9/28/2018.      Previous Goals:   Long Term Goals (LTGs); to be met by discharge.  LTG #1: Pt will report a pain level of 0 out of 10 at rest.  LTG #2: Pt will demo improved FOTO score by 19 points.   LTG #3: Pt will return to prior level of  function for ADLs and household management.   LTG #4: Pt will demo improved left hand  strength equal to at least 80% of right dominant hand  strength   LTG #5:  Pt will report/demo [place] with normal hair routine frequency and technique.    New Goals:    Goals; to be met by discharge.  #1: Pt will report a pain level of 1 out of 10 at rest for both elbow and shoulder.   #2: Pt will demo improved elbow FOTO score by 20 points and improved shoulder FOTO by 10 points. .   #3: Pt will demo increased left  strength by 10# total to aid with heavy household chores.  #4: Pt will increase shoulder ER to 4 out of 5 MMT to aid with retrieving and placing items at diagonal shoulder level.   #5: Pt will demo independence with raising/lowering 2# grocery items above shoulder level.       Reasons for Recertification of Therapy:  Pain, Decreased Strength, Decreased Muscular Endurance    Recommended Treatment Plan: Therapeutic Exercise, Functional Activities, Patient Education, Home Exercise Program, ADL Training, Ultrasound/Phonophoresis, Edema Control/Fluidotherapy, Electrical Stimulation/TENS/Interferential, Moist Heat/Ice/Paraffin and Manual Therapy      Therapist's Name: RAJEEV Agosto LOTR, CHT       Date: 08/09/2018     I CERTIFY THE NEED FOR THESE SERVICES FURNISHED UNDER THIS PLAN OF TREATMENT AND WHILE UNDER MY CARE    Physician's comments: ________________________________________________________________________________________________________________________________________________      Physician's Name (print): ___________________________________    Physician's Signature: _______________________________________________    Date: ________________________

## 2018-08-14 DIAGNOSIS — K31.84 GASTROPARESIS: Primary | ICD-10-CM

## 2018-08-15 ENCOUNTER — PATIENT MESSAGE (OUTPATIENT)
Dept: ORTHOPEDICS | Facility: CLINIC | Age: 58
End: 2018-08-15

## 2018-08-15 DIAGNOSIS — I25.10 CORONARY ARTERY DISEASE INVOLVING NATIVE CORONARY ARTERY OF NATIVE HEART WITHOUT ANGINA PECTORIS: Chronic | ICD-10-CM

## 2018-08-15 RX ORDER — OMEPRAZOLE AND SODIUM BICARBONATE 40; 1100 MG/1; MG/1
1 CAPSULE ORAL EVERY MORNING
Qty: 90 CAPSULE | Refills: 3 | Status: SHIPPED | OUTPATIENT
Start: 2018-08-15 | End: 2019-09-08 | Stop reason: SDUPTHER

## 2018-08-15 RX ORDER — ROSUVASTATIN CALCIUM 20 MG/1
TABLET, COATED ORAL
Qty: 90 TABLET | Refills: 3 | Status: SHIPPED | OUTPATIENT
Start: 2018-08-15 | End: 2019-04-13 | Stop reason: SDUPTHER

## 2018-08-15 NOTE — TELEPHONE ENCOUNTER
Spoke with patient.  Aware she is not to take Propulsid when she is taking Fluconazole and Nystatin.

## 2018-08-16 ENCOUNTER — OFFICE VISIT (OUTPATIENT)
Dept: INTERNAL MEDICINE | Facility: CLINIC | Age: 58
End: 2018-08-16
Payer: MEDICARE

## 2018-08-16 VITALS
HEIGHT: 61 IN | HEART RATE: 92 BPM | DIASTOLIC BLOOD PRESSURE: 82 MMHG | RESPIRATION RATE: 18 BRPM | TEMPERATURE: 99 F | WEIGHT: 151.44 LBS | SYSTOLIC BLOOD PRESSURE: 122 MMHG | BODY MASS INDEX: 28.59 KG/M2

## 2018-08-16 DIAGNOSIS — J32.9 VIRAL SINUSITIS: Primary | ICD-10-CM

## 2018-08-16 DIAGNOSIS — J20.9 ACUTE BRONCHITIS, UNSPECIFIED ORGANISM: ICD-10-CM

## 2018-08-16 DIAGNOSIS — B97.89 VIRAL SINUSITIS: Primary | ICD-10-CM

## 2018-08-16 PROCEDURE — 99214 OFFICE O/P EST MOD 30 MIN: CPT | Mod: S$GLB,,, | Performed by: INTERNAL MEDICINE

## 2018-08-16 PROCEDURE — 99999 PR PBB SHADOW E&M-EST. PATIENT-LVL V: CPT | Mod: PBBFAC,,, | Performed by: INTERNAL MEDICINE

## 2018-08-16 PROCEDURE — 3008F BODY MASS INDEX DOCD: CPT | Mod: CPTII,S$GLB,, | Performed by: INTERNAL MEDICINE

## 2018-08-16 RX ORDER — ESTROGENS, CONJUGATED 0.62 MG/1
0.62 TABLET, FILM COATED ORAL DAILY
Refills: 4 | COMMUNITY
Start: 2018-07-16 | End: 2022-02-01

## 2018-08-16 RX ORDER — CODEINE PHOSPHATE AND GUAIFENESIN 10; 100 MG/5ML; MG/5ML
5 SOLUTION ORAL 3 TIMES DAILY PRN
Qty: 236 ML | Refills: 0 | Status: SHIPPED | OUTPATIENT
Start: 2018-08-16 | End: 2018-08-26

## 2018-08-16 RX ORDER — METHYLPREDNISOLONE 4 MG/1
TABLET ORAL
Qty: 1 PACKAGE | Refills: 0 | Status: SHIPPED | OUTPATIENT
Start: 2018-08-16 | End: 2018-08-30

## 2018-08-16 RX ORDER — PREDNISONE 1 MG/1
TABLET ORAL
Qty: 90 TABLET | Refills: 1 | Status: SHIPPED | OUTPATIENT
Start: 2018-08-16 | End: 2018-09-24 | Stop reason: SDUPTHER

## 2018-08-16 NOTE — PROGRESS NOTES
Subjective:       Patient ID: Joyce Peterson is a 57 y.o. female.    Chief Complaint: Sinus Problem and Otalgia    HPI   Pt here for evaluation of 2 weeks of worsening sinus/chest congestion, left sided ear pressure/pain, post nasal drip, SOB. Minimal relief with Astelin and antihistamines.   Review of Systems   Constitutional: Negative for activity change, appetite change, chills, diaphoresis, fatigue, fever and unexpected weight change.   HENT: Positive for congestion, postnasal drip, rhinorrhea, sinus pressure and sinus pain. Negative for sneezing, sore throat, trouble swallowing and voice change.    Respiratory: Positive for cough, shortness of breath and wheezing.    Cardiovascular: Negative for chest pain, palpitations and leg swelling.   Gastrointestinal: Negative for abdominal pain, blood in stool, constipation, diarrhea, nausea and vomiting.   Genitourinary: Negative for dysuria.   Musculoskeletal: Negative for arthralgias and myalgias.   Skin: Negative for rash and wound.   Allergic/Immunologic: Negative for environmental allergies and food allergies.   Hematological: Negative for adenopathy. Does not bruise/bleed easily.       Objective:      Physical Exam   Constitutional: She is oriented to person, place, and time. She appears well-developed and well-nourished. No distress.   HENT:   Head: Normocephalic and atraumatic.   Right Ear: External ear normal.   Left Ear: External ear normal.   Nose: Mucosal edema and rhinorrhea present.   Mouth/Throat: Oropharynx is clear and moist. No oropharyngeal exudate.   Eyes: Conjunctivae and EOM are normal. Pupils are equal, round, and reactive to light. Right eye exhibits no discharge. Left eye exhibits no discharge. No scleral icterus.   Neck: Neck supple. No JVD present.   Cardiovascular: Normal rate, regular rhythm, normal heart sounds and intact distal pulses.   Pulmonary/Chest: Effort normal and breath sounds normal. No respiratory distress. She has no  wheezes (trace). She has no rales.   Musculoskeletal: She exhibits no edema.   Lymphadenopathy:     She has no cervical adenopathy.   Neurological: She is alert and oriented to person, place, and time.   Skin: Skin is warm and dry. No rash noted. She is not diaphoretic. No pallor.       Assessment:       1. Viral sinusitis    2. Acute bronchitis, unspecified organism        Plan:    1. Rx Medrol pack, Restart Flonase qHS, continue Astelin/antihistamine   2. Rx Cheratussin AC TID PRN

## 2018-08-17 DIAGNOSIS — M06.9 RHEUMATOID ARTHRITIS INVOLVING MULTIPLE SITES, UNSPECIFIED RHEUMATOID FACTOR PRESENCE: ICD-10-CM

## 2018-08-17 RX ORDER — METHOTREXATE 25 MG/ML
5 INJECTION, SOLUTION INTRA-ARTERIAL; INTRAMUSCULAR; INTRAVENOUS
Qty: 2 ML | Refills: 1 | Status: SHIPPED | OUTPATIENT
Start: 2018-08-17 | End: 2018-11-19

## 2018-08-19 ENCOUNTER — PATIENT MESSAGE (OUTPATIENT)
Dept: INTERNAL MEDICINE | Facility: CLINIC | Age: 58
End: 2018-08-19

## 2018-08-20 ENCOUNTER — PATIENT MESSAGE (OUTPATIENT)
Dept: INTERNAL MEDICINE | Facility: CLINIC | Age: 58
End: 2018-08-20

## 2018-08-20 ENCOUNTER — OFFICE VISIT (OUTPATIENT)
Dept: INTERNAL MEDICINE | Facility: CLINIC | Age: 58
End: 2018-08-20
Payer: MEDICARE

## 2018-08-20 VITALS
TEMPERATURE: 99 F | DIASTOLIC BLOOD PRESSURE: 73 MMHG | HEART RATE: 108 BPM | RESPIRATION RATE: 18 BRPM | SYSTOLIC BLOOD PRESSURE: 111 MMHG | WEIGHT: 152.75 LBS | BODY MASS INDEX: 28.84 KG/M2 | HEIGHT: 61 IN

## 2018-08-20 DIAGNOSIS — J06.9 UPPER RESPIRATORY TRACT INFECTION, UNSPECIFIED TYPE: Primary | ICD-10-CM

## 2018-08-20 PROCEDURE — 3008F BODY MASS INDEX DOCD: CPT | Mod: CPTII,S$GLB,, | Performed by: INTERNAL MEDICINE

## 2018-08-20 PROCEDURE — 99213 OFFICE O/P EST LOW 20 MIN: CPT | Mod: S$GLB,,, | Performed by: INTERNAL MEDICINE

## 2018-08-20 PROCEDURE — 99999 PR PBB SHADOW E&M-EST. PATIENT-LVL V: CPT | Mod: PBBFAC,,, | Performed by: INTERNAL MEDICINE

## 2018-08-20 RX ORDER — AZITHROMYCIN 250 MG/1
TABLET, FILM COATED ORAL
Qty: 6 TABLET | Refills: 0 | Status: SHIPPED | OUTPATIENT
Start: 2018-08-20 | End: 2018-08-25

## 2018-08-20 NOTE — PROGRESS NOTES
Subjective:       Patient ID: Joyce Peterson is a 57 y.o. female.    Chief Complaint: Cough; Chest Congestion; Chest Pain; Headache; and Ear Fullness    HPI   Pt here for f/u regarding URI. She was seen on 8/16 and started on a medrol pack, cough medication, restarted Flonase and continue on Astelin/antihistamine and inhalers. She feels here dry cough has worsened a/w SOB/wheezing and chest heaviness.   Review of Systems   Constitutional: Negative for activity change, appetite change, chills, diaphoresis, fatigue, fever and unexpected weight change.   HENT: Negative for postnasal drip, rhinorrhea, sinus pressure, sneezing, sore throat, trouble swallowing and voice change.    Respiratory: Positive for cough, chest tightness, shortness of breath and wheezing.    Cardiovascular: Negative for chest pain, palpitations and leg swelling.   Gastrointestinal: Negative for abdominal pain, blood in stool, constipation, diarrhea, nausea and vomiting.   Genitourinary: Negative for dysuria.   Musculoskeletal: Negative for arthralgias and myalgias.   Skin: Negative for rash and wound.   Allergic/Immunologic: Negative for environmental allergies and food allergies.   Hematological: Negative for adenopathy. Does not bruise/bleed easily.       Objective:      Physical Exam   Constitutional: She is oriented to person, place, and time. She appears well-developed and well-nourished. No distress.   HENT:   Head: Normocephalic and atraumatic.   Eyes: Conjunctivae and EOM are normal. Pupils are equal, round, and reactive to light. Right eye exhibits no discharge. Left eye exhibits no discharge. No scleral icterus.   Neck: Neck supple. No JVD present.   Cardiovascular: Normal rate, regular rhythm, normal heart sounds and intact distal pulses.   Pulmonary/Chest: Effort normal and breath sounds normal. No respiratory distress. She has no wheezes. She has no rales.   Musculoskeletal: She exhibits no edema.   Lymphadenopathy:     She has no  cervical adenopathy.   Neurological: She is alert and oriented to person, place, and time.   Skin: Skin is warm and dry. No rash noted. She is not diaphoretic. No pallor.       Assessment:       1. Upper respiratory tract infection, unspecified type        Plan:    1. Add Z-pack and CPT

## 2018-08-21 ENCOUNTER — HOSPITAL ENCOUNTER (OUTPATIENT)
Dept: RADIOLOGY | Facility: HOSPITAL | Age: 58
Discharge: HOME | End: 2018-08-21
Attending: UROLOGY
Payer: MEDICARE

## 2018-08-21 ENCOUNTER — OFFICE VISIT (OUTPATIENT)
Dept: UROLOGY | Facility: CLINIC | Age: 58
End: 2018-08-21
Payer: MEDICARE

## 2018-08-21 ENCOUNTER — OFFICE VISIT (OUTPATIENT)
Dept: RHEUMATOLOGY | Facility: CLINIC | Age: 58
End: 2018-08-21
Payer: MEDICARE

## 2018-08-21 ENCOUNTER — PATIENT MESSAGE (OUTPATIENT)
Dept: RHEUMATOLOGY | Facility: CLINIC | Age: 58
End: 2018-08-21

## 2018-08-21 ENCOUNTER — OFFICE VISIT (OUTPATIENT)
Dept: ENDOCRINOLOGY | Facility: CLINIC | Age: 58
End: 2018-08-21
Payer: MEDICARE

## 2018-08-21 ENCOUNTER — PATIENT MESSAGE (OUTPATIENT)
Dept: ENDOCRINOLOGY | Facility: CLINIC | Age: 58
End: 2018-08-21

## 2018-08-21 VITALS
BODY MASS INDEX: 28.89 KG/M2 | HEIGHT: 61 IN | WEIGHT: 153 LBS | DIASTOLIC BLOOD PRESSURE: 70 MMHG | SYSTOLIC BLOOD PRESSURE: 100 MMHG | HEART RATE: 97 BPM

## 2018-08-21 VITALS
HEIGHT: 61 IN | WEIGHT: 153.44 LBS | HEART RATE: 97 BPM | RESPIRATION RATE: 16 BRPM | DIASTOLIC BLOOD PRESSURE: 70 MMHG | BODY MASS INDEX: 28.97 KG/M2 | SYSTOLIC BLOOD PRESSURE: 100 MMHG

## 2018-08-21 VITALS
BODY MASS INDEX: 26.29 KG/M2 | SYSTOLIC BLOOD PRESSURE: 125 MMHG | DIASTOLIC BLOOD PRESSURE: 88 MMHG | HEART RATE: 110 BPM | WEIGHT: 154 LBS | HEIGHT: 64 IN

## 2018-08-21 DIAGNOSIS — D80.1 HYPOGAMMAGLOBULINEMIA: Primary | ICD-10-CM

## 2018-08-21 DIAGNOSIS — N28.1 RENAL CYST: ICD-10-CM

## 2018-08-21 DIAGNOSIS — N39.0 RECURRENT UTI: Primary | ICD-10-CM

## 2018-08-21 DIAGNOSIS — R79.89 ELEVATED PARATHYROID HORMONE: ICD-10-CM

## 2018-08-21 DIAGNOSIS — M85.80 OSTEOPENIA, UNSPECIFIED LOCATION: ICD-10-CM

## 2018-08-21 DIAGNOSIS — E05.90 SUBCLINICAL HYPERTHYROIDISM: Primary | ICD-10-CM

## 2018-08-21 DIAGNOSIS — E78.00 PURE HYPERCHOLESTEROLEMIA: ICD-10-CM

## 2018-08-21 DIAGNOSIS — M06.9 RHEUMATOID ARTHRITIS INVOLVING MULTIPLE SITES, UNSPECIFIED RHEUMATOID FACTOR PRESENCE: ICD-10-CM

## 2018-08-21 DIAGNOSIS — E04.1 THYROID NODULE: ICD-10-CM

## 2018-08-21 PROCEDURE — 99214 OFFICE O/P EST MOD 30 MIN: CPT | Mod: S$GLB,,, | Performed by: INTERNAL MEDICINE

## 2018-08-21 PROCEDURE — 99999 PR PBB SHADOW E&M-EST. PATIENT-LVL III: CPT | Mod: PBBFAC,,, | Performed by: UROLOGY

## 2018-08-21 PROCEDURE — 99999 PR PBB SHADOW E&M-EST. PATIENT-LVL V: CPT | Mod: PBBFAC,,, | Performed by: INTERNAL MEDICINE

## 2018-08-21 PROCEDURE — 3008F BODY MASS INDEX DOCD: CPT | Mod: CPTII,S$GLB,, | Performed by: INTERNAL MEDICINE

## 2018-08-21 PROCEDURE — 3008F BODY MASS INDEX DOCD: CPT | Mod: CPTII,S$GLB,, | Performed by: UROLOGY

## 2018-08-21 PROCEDURE — 99213 OFFICE O/P EST LOW 20 MIN: CPT | Mod: S$GLB,,, | Performed by: UROLOGY

## 2018-08-21 PROCEDURE — 76770 US EXAM ABDO BACK WALL COMP: CPT | Mod: 26,,, | Performed by: RADIOLOGY

## 2018-08-21 PROCEDURE — 76770 US EXAM ABDO BACK WALL COMP: CPT | Mod: TC

## 2018-08-21 ASSESSMENT — ROUTINE ASSESSMENT OF PATIENT INDEX DATA (RAPID3)
FATIGUE SCORE: 4.5
WHEN YOU AWAKENED IN THE MORNING OVER THE LAST WEEK, PLEASE INDICATE THE AMOUNT OF TIME IT TAKES UNTIL YOU ARE AS LIMBER AS YOU WILL BE FOR THE DAY: 1 HR
PSYCHOLOGICAL DISTRESS SCORE: 1.1
PAIN SCORE: 3
AM STIFFNESS SCORE: 1, YES
TOTAL RAPID3 SCORE: 4.66
PATIENT GLOBAL ASSESSMENT SCORE: 6
MDHAQ FUNCTION SCORE: 1.5

## 2018-08-21 ASSESSMENT — DISEASE ACTIVITY SCORE (DAS28)
ESR_MM_PER_HR: 3
TOTAL_SCORE_CRP: 2.85
GLOBAL_HEALTH_SCORE: 60
TOTAL_SCORE_ESR: 2.57
SWOLLEN_JOINTS_COUNT: 2
TENDER_JOINTS_COUNT: 1
CRP_MG_PER_LITER: .3

## 2018-08-21 NOTE — ASSESSMENT & PLAN NOTE
DAS28 2.57(remission) [id number] 2.85(LDA)    *Shingrix x 2 when available  Resume tofacitinib-XR and methotrexate 5mg sc once  A week after URI resolved  Cont leflunomide 20mg daily  Quantitative immunoglobulins, IgG subclasses next labs  Other options in future: sarilumab sc(2nd IL-6r antibody( I would favor as had vertigo with single sc injection of tocilizumab but never took long enough to evaluate efficacy, certolizumab(4th anti-TNF, less likely efficacy), baricitinib(2nd USMAN inhibitor tofacitinib and likely not as effective at FDA approved dose); anakinra

## 2018-08-21 NOTE — PROGRESS NOTES
Subjective:     Patient ID: Joyce Peterson is a 57 y.o. female.    Chief Complaint: No chief complaint on file.    HPI:   Ms. Peterson is a 57 y.o. female who is here for a follow-up visit for evaluation of subclinical hyperthyroidism, osteopenia and thyroid nodules. Has RA and sees Dr. Moran     US of thyroid no change since 2012  Dominant nodule right inf, 1.4 cm. Left lobe has multiple subcentimeter nodules.   FNA 8/6/12 Benign.  No hx of thyroid cancer in family  No hx of radiation exposure  Denies personal history of breast or colon cancer.      Has history of subclinical hyperthyroidism in the past, 0.3 - 0.8. Most recent labs are normal. Review of medical chart demonstrates borderline labs since 2007. Reviewed symptoms of thyrotoxicosis and none are present.      Osteopenia (lumbar spine T score -2.1 and FN -1.1), currently on prednisone and added prolia for her bones. Still continues to use estrogen and progesterone and calcium. Staying active, (strength training class), no falls. Recent DXA demonstrates -6% decline in BMD at the total hip. Trying to lose weight  5 lbs.  Prednisone dose fluctuates but currently prednisone 3 mg daily. Five foot fractures in the distant past.     Review of Systems   Constitutional: Negative for chills and fever.   HENT: Negative for congestion and sinus pressure.    Eyes: Negative for visual disturbance.   Respiratory: Negative for chest tightness and shortness of breath.    Cardiovascular: Negative for chest pain, palpitations and leg swelling.   Gastrointestinal: Negative for abdominal pain and vomiting.   Genitourinary: Negative for dysuria.   Musculoskeletal: Negative for arthralgias.   Skin: Negative for rash.   Neurological: Negative for weakness.   Hematological: Does not bruise/bleed easily.   Psychiatric/Behavioral: Negative for sleep disturbance.        Objective:     Physical Exam     Vitals:    08/21/18 1412   BP: 100/70   Pulse: 97   Resp: 16   Weight: 69.6  "kg (153 lb 7 oz)   Height: 5' 1" (1.549 m)       Results for TONNY GOMEZ (MRN [id number]) as of 8/21/2018 14:27   Ref. Range 8/7/2018 07:30   TSH Latest Ref Range: 0.400 - 4.000 uIU/mL 0.504   PTH Latest Ref Range: 9.0 - 77.0 pg/mL 89.0 (H)     Results for TONNY GOMEZ (MRN [id number]) as of 8/20/2018 19:51   Ref. Range 8/7/2018 07:30   PTH Latest Ref Range: 9.0 - 77.0 pg/mL 89.0 (H)     Results for TONNY GOMEZ (MRN [id number]) as of 8/20/2018 19:51   Ref. Range 8/7/2018 07:30   Sodium Latest Ref Range: 136 - 145 mmol/L 140   Potassium Latest Ref Range: 3.5 - 5.1 mmol/L 3.9   Chloride Latest Ref Range: 95 - 110 mmol/L 106   CO2 Latest Ref Range: 23 - 29 mmol/L 26   Anion Gap Latest Ref Range: 8 - 16 mmol/L 8   BUN, Bld Latest Ref Range: 6 - 20 mg/dL 16   Creatinine Latest Ref Range: 0.5 - 1.4 mg/dL 0.8   eGFR if non African American Latest Ref Range: >60 mL/min/1.73 m^2 >60.0   eGFR if African American Latest Ref Range: >60 mL/min/1.73 m^2 >60.0   Glucose Latest Ref Range: 70 - 110 mg/dL 105   Calcium Latest Ref Range: 8.7 - 10.5 mg/dL 9.3   Phosphorus Latest Ref Range: 2.7 - 4.5 mg/dL 3.0   Albumin Latest Ref Range: 3.5 - 5.2 g/dL 3.7     Ultrasound 8/2018:  The thyroid is normal in size.  The thyroid parenchyma is hypoechoic, heterogenous, normal vascularity.  No nodules meet criteria for FNA.  No abnormal appearing cervical lymph nodes are noted.  Recommendations: Repeat US in 3-4 years.    Results for TONNY GOMEZ (MRN [id number]) as of 8/20/2018 19:51   Ref. Range 8/7/2018 07:30   Vit D, 25-Hydroxy Latest Ref Range: 30 - 96 ng/mL 38     Results for TONNY GOMEZ (MRN [id number]) as of 8/21/2018 14:27   Ref. Range 5/8/2017 16:49   Calcium, Urine Latest Ref Range: 0.0 - 15.0 mg/dL 3.8   Calcium, 24H Urine Latest Ref Range: 4 - 12 mg/Hr 6   CA Urine (mg/Spec) Latest Units: mg/Spec 148     Assessment/Plan:     Subclinical hyperthyroidism  Most recent levels normal, but in the past TSH has been low " with normal free T4  Labs in 11/2018  Concerned about bone loss, but does not want another medication at this time.   If TSH persistently low, consider low dose MMI.     Thyroid nodules  Sub-centimeter mixed nodule  1.2 cm cystic nodules - no aspiration  Repeat US in 2 years     Elevated/Secondary hyperparathyroidism  No history of elevated serum calcium.   24 hr urine for calcium/creatinine -- normal 148 mg/specimen with normal vitamin D levels (33 - 38 ng/ml)  Celiac screen - normal two years ago  Citra joanie + D one tablet twice a day   Will follow.     Osteopenia and steroids   Using prolia every six months

## 2018-08-21 NOTE — PROGRESS NOTES
"Subjective:       Patient ID: Joyce Peterson is a 57 y.o. female.    Chief Complaint: RA  HPI URI. 1 hr am stiffness. Nausea better if doesn't take naproxen, tofacitinib, leflunomide same day as methotrexate 5mg sc weekly.  Review of Systems   Constitutional: Positive for fatigue. Negative for appetite change, fever and unexpected weight change.   HENT: Negative for mouth sores.         Dry mouth   Eyes: Negative for visual disturbance.        Dry   Respiratory: Positive for cough and shortness of breath. Negative for wheezing.    Cardiovascular: Negative for chest pain and palpitations.   Gastrointestinal: Negative for abdominal pain, anal bleeding, blood in stool, constipation, diarrhea, nausea and vomiting.   Genitourinary: Negative for dysuria, frequency and urgency.   Musculoskeletal: Negative for arthralgias, back pain, gait problem, joint swelling, myalgias, neck pain and neck stiffness.   Skin: Negative for rash.   Neurological: Positive for headaches. Negative for weakness and numbness.   Hematological: Negative for adenopathy. Does not bruise/bleed easily.   Psychiatric/Behavioral: Negative for sleep disturbance. The patient is not nervous/anxious.          Objective:   /88   Pulse 110   Ht 5' 3.6" (1.615 m)   Wt 69.9 kg (154 lb)   LMP  (LMP Unknown)   BMI 26.77 kg/m²      Physical Exam   Constitutional: She is oriented to person, place, and time and well-developed, well-nourished, and in no distress.   HENT:   Head: Normocephalic and atraumatic.   Mouth/Throat: Oropharynx is clear and moist.   Eyes: Conjunctivae and EOM are normal.   Neck: Normal range of motion. Neck supple. No thyromegaly present.   Cardiovascular: Normal rate, regular rhythm, normal heart sounds and intact distal pulses.  Exam reveals no gallop and no friction rub.    No murmur heard.  Pulmonary/Chest: Breath sounds normal. She has no wheezes. She has no rales. She exhibits no tenderness.   Abdominal: Soft. She exhibits " no distension and no mass. There is no tenderness.       Right Side Rheumatological Exam     Examination finds the shoulder, wrist and knee normal.    The patient is tender to palpation of the elbow    She has swelling of the elbow    The patient has an enlarged 1st PIP, 1st MCP, 2nd PIP, 2nd MCP, 3rd PIP, 3rd MCP, 4th PIP, 4th MCP, 5th PIP and 5th MCP    Left Side Rheumatological Exam     Examination finds the shoulder, wrist and knee normal.    She has swelling of the elbow    The patient has an enlarged 1st PIP, 1st MCP, 2nd PIP, 2nd MCP, 3rd PIP, 3rd MCP, 4th PIP, 4th MCP, 5th PIP and 5th MCP.      Lymphadenopathy:     She has no cervical adenopathy.   Neurological: She is alert and oriented to person, place, and time. She displays normal reflexes. Gait normal.   Nl motor strength UE and LE bilateral   Musculoskeletal: She exhibits no edema.           Assessment/Plan         Problem List Items Addressed This Visit     Rheumatoid arthritis involving multiple sites    Overview     erosive RA discontinued long term methotrexate b/o nausea with po and sc and reduced dose, allergic to sulfa, and had reaction to hydroxychloroquine;   tolerating leflunomide and tofacitinib.  Has failed 3 TNFi(infliximab, etanercept, adalimumab), abatacept, hypogamma with rituximab and gets frequent infections in any event, acute vertigo/dizziness with tocilizumab x2           Current Assessment & Plan     DAS28 2.57(remission) [id number] 2.85(LDA)    *Shingrix x 2 when available  Resume tofacitinib-XR and methotrexate 5mg sc once  A week after URI resolved  Cont leflunomide 20mg daily  Quantitative immunoglobulins, IgG subclasses next labs  Other options in future: sarilumab sc(2nd IL-6r antibody( I would favor as had vertigo with single sc injection of tocilizumab but never took long enough to evaluate efficacy, certolizumab(4th anti-TNF, less likely efficacy), baricitinib(2nd USMAN inhibitor tofacitinib and likely not as effective at  FDA approved dose); anakinra         Osteopenia    Overview     Please tell pt the bone density shows osteopenia which should be treated given her current dose of prednisone. Please schedule brief f/u visit to discuss in next several weeks. Thanks. RJQ          PACS Images     Show images for DXA Bone Density Spine And Hip_Axial Skeleton   Reviewed By Asaf Moran MD on 2017 13:37   Patient Result Comments     Viewed by Joyce Peterson on 2017  4:35 PM   Written by Isiah Moran MD on 2017  1:37 PM   The bone density shows osteopenia which should be treated given your current dose of prednisone. Ronel will schedule brief visit to discuss therapy options for this. RJQ   External Result Report     External Result Report   Narrative     : 1960 ORDERING PHYSICIAN: JARON Moran LOCATION: [facility]    HISTORY: 57 y/o female with no hx of fractures. Pts Mother had a wrist fx. Pt is taking Calcium, Vit D, Estrogen and 7mg of Prednisone. She has a hx of Rheumatoid Arthritis and Asthma. She exercises 3 times a week and does not smoke.    TECHNIQUE: Bone Mineral Density performed using Hologic Horizon A (S/N [id number]) reveals good positioning of lumbar spine and hip.    BONE MINERAL DENSITY RESULTS:  Lumbar Spine: Lumbar bone mineral density L1-L4 is 0.819g/cm2, which is a t-score of -2.1. The z-score is -0.9.    Total Hip: The total hip bone mineral density is 0.868g/cm2.  The t-score is -0.6, and the z-score is 0.1.  Femoral neck BMD is 0.723g/cm2 and the t-score is -1.1.      COMPARISONS:  Date Location BMD T-score  12 L-spine 0.828 -2.0  Total Hip 0.923 -0.2   Impression      Low bone mass/osteopenia of the lumbar spine and femoral neck.  Decrease in hip BMD (-6%) since prior study. FRAX calculations do not suggest treatment.      Recommendations:  1) Adequate calcium and Vitamin D therapy  2) Appropriate exercise  3) Glucocorticoids may adversely affect quality  and quantity of bone.    Consider bisphosphonate if patient requires prednisone 7.5 mg or greater for 3 months or more.  4) Consider repeat BMD in 1-2 years    EXPLANATION OF RESULTS:  The t-score compares this results to the bone density of a 25 year old of the same gender. The z-score compares this result to the average bone density to people of the same age and gender. The amounts indicate the number of standard deviations above or below the mean.  * Osteoporosis is generally defined as having a t-score between less than -2.5.  * Osteopenia is generally defined as having a t-score between -1 and -2.5.  * The normal range is generally defined as having a t-score between -1 to 1.      Electronically signed by: VARUN LAURA MD  Date: 02/21/17        Results for TONNY GOMEZ (MRN [id number]) as of 5/21/2018 11:31   Ref. Range 8/11/2017 09:50   Vit D, 25-Hydroxy Latest Ref Range: 30 - 96 ng/mL 45            Current Assessment & Plan     First denosumab 60mg sc 8/28/17  Last denosumab 60mg sc 3/1/18  Schedule next 9/1/18         Hyperlipidemia    Overview     Results for TONNY GOMEZ (MRN [id number]) as of 8/21/2018 13:34   Ref. Range 8/7/2018 07:30   Cholesterol Latest Ref Range: 120 - 199 mg/dL 209 (H)   HDL Latest Ref Range: 40 - 75 mg/dL 98 (H)   LDL Cholesterol Latest Ref Range: 63.0 - 159.0 mg/dL 83.2   Total Cholesterol/HDL Ratio Latest Ref Range: 2.0 - 5.0  2.1   Triglycerides Latest Ref Range: 30 - 150 mg/dL 139            Current Assessment & Plan     Continue rosuvastatin 20mg daily         Hypogammaglobulinemia - Primary    Relevant Orders    IMMUNOGLOBULINS (IGG, IGA, IGM) QUANTITATIVE    (In Office Administered) Zoster Recombinant Vaccine    (In Office Administered) Zoster Recombinant Vaccine    IgG 1, 2, 3, and 4

## 2018-08-21 NOTE — PROGRESS NOTES
Subjective:       Patient ID: Joyce Peterson is a 57 y.o. female.    Chief Complaint:  Renal mass.      History of Present Illness  HPI  Patient is a 57 y.o. female who is established to our clinic and referred by their PCP, Dr. Batista for evaluation of a renal cyst vs mass.   She underwent a health screening at her [facility] in which an ultrasound was used ultrasound her carotid arteries, her thyroid, and her kidneys.    She underwent an ultrasound in February showing no renal lesion.  She returns today having had another renal ultrasound today.  Ultrasound was independently reviewed today and reveals no renal abnormalities noted.       No gross hematuria.  No abdominal pain.  No flank pain.  No other complaints today.  She was seen by Dr. Pedraza recently for recurrent UTI's.  She underwent a cystoscopy showing a right ureterocele, which they are observing for now.         Review of Systems  Review of Systems  All other systems reviewed and negative except pertinent positives noted in HPI.       Objective:     Physical Exam   Constitutional: She is oriented to person, place, and time. She appears well-developed and well-nourished. No distress.   HENT:   Head: Normocephalic and atraumatic.   Eyes: No scleral icterus.   Neck: No tracheal deviation present.   Pulmonary/Chest: Effort normal. No respiratory distress.   Neurological: She is alert and oriented to person, place, and time.   Psychiatric: She has a normal mood and affect. Her behavior is normal. Judgment and thought content normal.       Lab Review  Lab Results   Component Value Date    COLORU Yellow 08/07/2018    SPECGRAV 1.020 08/07/2018    PHUR 6.0 08/07/2018    NITRITE Negative 08/07/2018    KETONESU Negative 08/07/2018    UROBILINOGEN Negative 08/07/2018         Assessment:        1. Recurrent UTI            Plan:     Recurrent UTI      -patient can continue to f/u with Dr. Pedraza for management of her uretrocele/UTI's.   -renal US x2 negative for  renal pathology.  F/u imaging and f/u with me PRN.   I spent 15 minutes with the patient of which more than half was spent in direct consultation with the patient in regards to our treatment and plan.

## 2018-08-21 NOTE — TELEPHONE ENCOUNTER
Patient called just to informed me of all the appointments she had just in case she be a little late

## 2018-08-27 ENCOUNTER — TELEPHONE (OUTPATIENT)
Dept: ORTHOPEDICS | Facility: CLINIC | Age: 58
End: 2018-08-27

## 2018-08-27 ENCOUNTER — OFFICE VISIT (OUTPATIENT)
Dept: HEMATOLOGY/ONCOLOGY | Facility: CLINIC | Age: 58
End: 2018-08-27
Payer: MEDICARE

## 2018-08-27 ENCOUNTER — LAB VISIT (OUTPATIENT)
Dept: LAB | Facility: HOSPITAL | Age: 58
End: 2018-08-27
Attending: INTERNAL MEDICINE
Payer: MEDICARE

## 2018-08-27 VITALS
DIASTOLIC BLOOD PRESSURE: 80 MMHG | SYSTOLIC BLOOD PRESSURE: 124 MMHG | HEIGHT: 61 IN | BODY MASS INDEX: 28.89 KG/M2 | TEMPERATURE: 99 F | OXYGEN SATURATION: 94 % | HEART RATE: 95 BPM | WEIGHT: 153 LBS | RESPIRATION RATE: 18 BRPM

## 2018-08-27 DIAGNOSIS — D50.8 OTHER IRON DEFICIENCY ANEMIA: Primary | ICD-10-CM

## 2018-08-27 DIAGNOSIS — D50.0 IRON DEFICIENCY ANEMIA DUE TO CHRONIC BLOOD LOSS: ICD-10-CM

## 2018-08-27 LAB
BASOPHILS # BLD AUTO: 0.08 K/UL
BASOPHILS NFR BLD: 1.1 %
DIFFERENTIAL METHOD: NORMAL
EOSINOPHIL # BLD AUTO: 0 K/UL
EOSINOPHIL NFR BLD: 0.4 %
ERYTHROCYTE [DISTWIDTH] IN BLOOD BY AUTOMATED COUNT: 14.1 %
FERRITIN SERPL-MCNC: 77 NG/ML
HCT VFR BLD AUTO: 40.9 %
HGB BLD-MCNC: 13.2 G/DL
IMM GRANULOCYTES # BLD AUTO: 0.03 K/UL
IMM GRANULOCYTES NFR BLD AUTO: 0.4 %
LYMPHOCYTES # BLD AUTO: 1.5 K/UL
LYMPHOCYTES NFR BLD: 19.5 %
MCH RBC QN AUTO: 30.7 PG
MCHC RBC AUTO-ENTMCNC: 32.3 G/DL
MCV RBC AUTO: 95 FL
MONOCYTES # BLD AUTO: 0.8 K/UL
MONOCYTES NFR BLD: 11.3 %
NEUTROPHILS # BLD AUTO: 5 K/UL
NEUTROPHILS NFR BLD: 67.3 %
NRBC BLD-RTO: 0 /100 WBC
PLATELET # BLD AUTO: 259 K/UL
PMV BLD AUTO: 10 FL
RBC # BLD AUTO: 4.3 M/UL
WBC # BLD AUTO: 7.44 K/UL

## 2018-08-27 PROCEDURE — 82728 ASSAY OF FERRITIN: CPT

## 2018-08-27 PROCEDURE — 36415 COLL VENOUS BLD VENIPUNCTURE: CPT

## 2018-08-27 PROCEDURE — 3008F BODY MASS INDEX DOCD: CPT | Mod: CPTII,S$GLB,, | Performed by: INTERNAL MEDICINE

## 2018-08-27 PROCEDURE — 99214 OFFICE O/P EST MOD 30 MIN: CPT | Mod: S$GLB,,, | Performed by: INTERNAL MEDICINE

## 2018-08-27 PROCEDURE — 85025 COMPLETE CBC W/AUTO DIFF WBC: CPT

## 2018-08-27 PROCEDURE — 99999 PR PBB SHADOW E&M-EST. PATIENT-LVL III: CPT | Mod: PBBFAC,,, | Performed by: INTERNAL MEDICINE

## 2018-08-27 NOTE — TELEPHONE ENCOUNTER
"----- Message from Consuelo Bobo sent at 8/27/2018  7:07 AM CDT -----  Contact: TONNY GOMEZ [[id number]]      Name of Who is Calling: TONNY GOMEZ [[id number]]      What is the request in detail:   Patient called requesting to be seen tomorrow Tuesday 08/28/2018  by Dr. Linnea velazquez. Says, "she had a fall on her left elbow and she's experiencing extreme pain."  I did attempt to schedule per patient's request but was unsuccessful.      Please give a call back at your earliest convenience.   THANKS!        Can the clinic reply by [facility]: No      What Number to Call Back:  TONNY GOMEZ / cell# [phone]                                    "

## 2018-08-27 NOTE — PROGRESS NOTES
SECTION OF HEMATOLOGY AND BONE MARROW TRANSPLANT  Return  Patient Visit   08/28/2018  Referred by:  No ref. provider found  Referred for: ROBERT    CHIEF COMPLAINT:   No chief complaint on file.      HISTORY OF PRESENT ILLNESS:   Referred to me November 2017  for ROBERT.  Intolerant to po iron.  Iron studies in march 2017 with severe ID.  History RA followed by Dr. Valverde.  History of gastroparesis followed by Dr. Moore in GI.  Denies fever, chills, nightsweats, bleeding, brusing, lymphadenopathy, signs/symptoms of splenomegaly.   Had upper and lower endoscopy summer 2017 unremarkable.  No VCE done.  Denies any overt GI bleeding or other bleeding.    S/p repeat IV feraheme x 2 January 2018.  Tolerated well.  Feels well.  Working with rheumatology for RA and has resumed MTX.  Comes to clinic alone.        PAST MEDICAL HISTORY:   Past Medical History:   Diagnosis Date    Acid reflux     Allergy     Anemia     Asthma     Coronary artery disease     Degenerative disc disease     Dry eyes     Dry mouth     Gastroparesis     Hyperlipidemia     Lateral meniscus derangement 4/6/2016    Lobular carcinoma in situ     Osteoarthritis     Osteoporosis     Rheumatoid arthritis(714.0)     Umbilical hernia 8/13/2015       PAST SURGICAL HISTORY:   Past Surgical History:   Procedure Laterality Date    BREAST BIOPSY Left 01/29/2002    core bx    CARPAL TUNNEL RELEASE Right 05/2017    CHOLECYSTECTOMY  2004    COLONOSCOPY      10/11    TONSILLECTOMY      TUBAL LIGATION  2003    UPPER GASTROINTESTINAL ENDOSCOPY      10/11    uterine ablation  2003       PAST SOCIAL HISTORY:   reports that  has never smoked. she has never used smokeless tobacco. She reports that she drinks alcohol. She reports that she does not use drugs.    FAMILY HISTORY:  Family History   Problem Relation Age of Onset    Cancer Mother         Lung Cancer    Emphysema Mother     Heart attack Mother     Cancer Father         Lung Cancer     Osteoarthritis Father     Lung cancer Father     Skin cancer Father     Heart disease Brother     Heart attack Brother     Osteoarthritis Paternal Aunt     Retinal detachment Maternal Aunt     No Known Problems Sister     No Known Problems Maternal Uncle     No Known Problems Paternal Uncle     No Known Problems Maternal Grandmother     No Known Problems Maternal Grandfather     No Known Problems Paternal Grandmother     No Known Problems Paternal Grandfather     Colon cancer Neg Hx     Esophageal cancer Neg Hx     Stomach cancer Neg Hx     Celiac disease Neg Hx     Diabetes Neg Hx     Thyroid disease Neg Hx     Amblyopia Neg Hx     Blindness Neg Hx     Cataracts Neg Hx     Glaucoma Neg Hx     Hypertension Neg Hx     Macular degeneration Neg Hx     Strabismus Neg Hx     Stroke Neg Hx        CURRENT MEDICATIONS:   Current Outpatient Medications   Medication Sig    albuterol 90 mcg/actuation inhaler Inhale 2 puffs into the lungs every 6 (six) hours as needed for Wheezing.    aspirin 81 mg Tab Take 81 mg by mouth every morning.     azelastine (ASTELIN) 137 mcg (0.1 %) nasal spray 1 spray (137 mcg total) by Nasal route 2 (two) times daily.    azelastine (ASTELIN) 137 mcg nasal spray 1 spray (137 mcg total) by Nasal route 2 (two) times daily. (Patient taking differently: 1 spray by Nasal route 2 (two) times daily as needed. )    budesonide-formoterol 160-4.5 mcg (SYMBICORT) 160-4.5 mcg/actuation HFAA Inhale 2 puffs into the lungs every 12 (twelve) hours.    calcium citrate-vitamin D (CITRACAL + D) 315-200 mg-unit per tablet Take 1 tablet by mouth 2 (two) times daily.     fish oil-omega-3 fatty acids 300-1,000 mg capsule Take 1,400 g by mouth once daily.    flaxseed oil 1,000 mg Cap Take by mouth once daily.    fluticasone (FLONASE) 50 mcg/actuation nasal spray     folic acid (FOLVITE) 1 MG tablet TAKE 1 TABLET BY MOUTH EVERY DAY    GUAIFENESIN (MUCINEX ORAL) Take 400 mg by mouth  "every 4 (four) hours as needed.     INV PROPULSID 10 MG Take 20 mg by mouth 4 (four) times daily. FOR INVESTIGATIONAL USE ONLY. Protocol [id number]    leflunomide (ARAVA) 20 MG Tab TAKE 1 TABLET (20 MG TOTAL) BY MOUTH ONCE DAILY.    methotrexate 25 mg/mL injection Inject 0.2 mLs (5 mg total) into the skin every 7 days.    methylPREDNISolone (MEDROL, DEVON,) 4 mg tablet use as directed    montelukast (SINGULAIR) 10 mg tablet Take 1 tablet (10 mg total) by mouth nightly.    naproxen (NAPROSYN) 500 MG tablet TAKE 1 TABLET TWICE A DAY AS NEEDED    omeprazole (PRILOSEC) 40 MG capsule TAKE ONE CAPSULE BY MOUTH EVERY MORNING    omeprazole-sodium bicarbonate (ZEGERID) 40-1.1 mg-gram per capsule TAKE 1 CAPSULE BY MOUTH EVERY MORNING.    POTASSIUM ORAL Take by mouth once daily.    predniSONE (DELTASONE) 1 MG tablet TAKE 3 TABLETS EVERY DAY    PREMARIN 0.625 mg tablet Take 0.625 mg by mouth once daily.    progesterone (PROMETRIUM) 100 MG capsule Take 100 mg by mouth every morning. 1 Capsule Oral Every day, morning    RESTASIS 0.05 % ophthalmic emulsion     rizatriptan (MAXALT) 10 MG tablet Take 10 mg by mouth as needed for Migraine.     rosuvastatin (CRESTOR) 20 MG tablet TAKE 1 TABLET EVERY DAY    sucralfate (CARAFATE) 1 gram tablet Take 1 tablet (1 g total) by mouth 4 (four) times daily.    syringe with needle (TUBERCULIN SYRINGE) 1 mL 27 x 1/2" Syrg To administer methotrexate 7.5mg sc once a week    tofacitinib (XELJANZ XR) 11 mg Tb24 Take 11 mg by mouth once daily.    tramadol (ULTRAM) 50 mg tablet TAKE 1 TABLET DAILY AS NEEDED PAIN    trospium (SANCTURA) 20 mg Tab tablet Take 1 tablet (20 mg total) by mouth 2 (two) times daily.    valACYclovir (VALTREX) 1000 MG tablet TAKE 1 TABLET BY MOUTH TWICE A DAY AS NEEDED     No current facility-administered medications for this visit.      ALLERGIES:   Review of patient's allergies indicates:   Allergen Reactions    Actemra [tocilizumab] Other (See Comments) " "    Severe dizziness    Codeine Nausea And Vomiting    Gold au 198 Hives and Rash    Hydroxychloroquine Other (See Comments)     Can't remember the reaction      Iodinated contrast- oral and iv dye Other (See Comments)     Other reaction(s): BURNING ALL OVER    Iodine Other (See Comments)     Other reaction(s): BURNING ALL OVER - Iodine dye - Not topical    Sulfa (sulfonamide antibiotics) Other (See Comments)     Can't remember the reaction    Zofran [ondansetron hcl (pf)] Nausea And Vomiting     Pt reports that last time she received zofran she started vomiting again    Methotrexate analogues Nausea Only    Pneumovax 23 [pneumococcal 23-argenis ps vaccine] Other (See Comments)     "sick"             REVIEW OF SYSTEMS:   General ROS: negative  Psychological ROS: negative  Ophthalmic ROS: negative  ENT ROS: negative  Allergy and Immunology ROS: negative  Hematological and Lymphatic ROS: negative  Endocrine ROS: negative  Respiratory ROS: negative  Cardiovascular ROS: negative  Gastrointestinal ROS: see HPI  Genito-Urinary ROS: negative  Musculoskeletal ROS: see HPI  Neurological ROS: negative  Dermatological ROS: negative    PHYSICAL EXAM:   Vitals:    08/27/18 1047   BP: 124/80   Pulse: 95   Resp: 18   Temp: 99.2 °F (37.3 °C)       General - well developed, well nourished, no apparent distress  Head & Face - no sinus tenderness  Eyes - normal conjunctivae and lids   ENT - normal external auditory canals and tympanic membranes bilaterally oropharynx clear,  Normal dentition and gums  Neck - normal thyroid  Chest and Lung - normal respiratory effort, clear to auscultation bilaterally   Cardiovascular - RRR with no MGR, normal S1 and S2; no pedal edema  Abdomen -  soft, nontender, no palpable hepatomegaly or splenomegaly  Lymph - no palpable lymphadenopathy  Extremities - changes of RA   Heme - no bruising, petechiae, pallor  Skin - no rashes or lesions  Psych - appropriate mood and affect      ECOG Performance " Status: (foot note - ECOG PS provided by [employer]cology [employer]) 1 - Symptomatic but completely ambulatory    Karnofsky Performance Score:  90%- Able to Carry on Normal Activity: Minor Symptoms of Disease  DATA:   Lab Results   Component Value Date    WBC 7.44 08/27/2018    HGB 13.2 08/27/2018    HCT 40.9 08/27/2018    MCV 95 08/27/2018     08/27/2018     Gran # (ANC)   Date Value Ref Range Status   08/27/2018 5.0 1.8 - 7.7 K/uL Final     Gran%   Date Value Ref Range Status   08/27/2018 67.3 38.0 - 73.0 % Final     CMP  Sodium   Date Value Ref Range Status   08/07/2018 140 136 - 145 mmol/L Final   08/07/2018 140 136 - 145 mmol/L Final     Potassium   Date Value Ref Range Status   08/07/2018 3.9 3.5 - 5.1 mmol/L Final   08/07/2018 3.9 3.5 - 5.1 mmol/L Final     Chloride   Date Value Ref Range Status   08/07/2018 106 95 - 110 mmol/L Final   08/07/2018 106 95 - 110 mmol/L Final     CO2   Date Value Ref Range Status   08/07/2018 26 23 - 29 mmol/L Final   08/07/2018 26 23 - 29 mmol/L Final     Glucose   Date Value Ref Range Status   08/07/2018 105 70 - 110 mg/dL Final   08/07/2018 105 70 - 110 mg/dL Final     BUN, Bld   Date Value Ref Range Status   08/07/2018 16 6 - 20 mg/dL Final   08/07/2018 16 6 - 20 mg/dL Final     Creatinine   Date Value Ref Range Status   08/07/2018 0.8 0.5 - 1.4 mg/dL Final   08/07/2018 0.8 0.5 - 1.4 mg/dL Final     Calcium   Date Value Ref Range Status   08/07/2018 9.3 8.7 - 10.5 mg/dL Final   08/07/2018 9.3 8.7 - 10.5 mg/dL Final     Total Protein   Date Value Ref Range Status   08/07/2018 6.6 6.0 - 8.4 g/dL Final     Albumin   Date Value Ref Range Status   08/07/2018 3.7 3.5 - 5.2 g/dL Final   08/07/2018 3.7 3.5 - 5.2 g/dL Final     Total Bilirubin   Date Value Ref Range Status   08/07/2018 0.5 0.1 - 1.0 mg/dL Final     Comment:     For infants and newborns, interpretation of results should be based  on gestational age, weight and in agreement with  clinical  observations.  Premature Infant recommended reference ranges:  Up to 24 hours.............<8.0 mg/dL  Up to 48 hours............<12.0 mg/dL  3-5 days..................<15.0 mg/dL  6-29 days.................<15.0 mg/dL       Alkaline Phosphatase   Date Value Ref Range Status   08/07/2018 78 55 - 135 U/L Final     AST   Date Value Ref Range Status   08/07/2018 16 10 - 40 U/L Final     ALT   Date Value Ref Range Status   08/07/2018 22 10 - 44 U/L Final     Anion Gap   Date Value Ref Range Status   08/07/2018 8 8 - 16 mmol/L Final   08/07/2018 8 8 - 16 mmol/L Final     eGFR if    Date Value Ref Range Status   08/07/2018 >60.0 >60 mL/min/1.73 m^2 Final   08/07/2018 >60.0 >60 mL/min/1.73 m^2 Final     eGFR if non    Date Value Ref Range Status   08/07/2018 >60.0 >60 mL/min/1.73 m^2 Final     Comment:     Calculation used to obtain the estimated glomerular filtration  rate (eGFR) is the CKD-EPI equation.      08/07/2018 >60.0 >60 mL/min/1.73 m^2 Final     Comment:     Calculation used to obtain the estimated glomerular filtration  rate (eGFR) is the CKD-EPI equation.        Lab Results   Component Value Date    IRON 22 (L) 11/13/2017    TIBC 545 (H) 11/13/2017    FERRITIN 77 08/27/2018         ASSESSMENT AND PLAN:   Encounter Diagnosis   Name Primary?    Other iron deficiency anemia Yes        Microcytic anemia likely ROBERT  due to malabsorption related to GI issues ; possible anemia of chronic inflammation from RA and meds    contributing  Denies any over GI bleeding; Recent summer 2017 upper and lower GI endoscopy negative; VCE deferred but may need if ROBERT persists  Intolerant to po iron   S/p feraheme x 2 jan 2018 with normalization of hgb and ferritin   Has fu with GI in April 2018   Fu with rheum for RA  Recommend serial lab monitoring for further prn iron infusion needs particularly in light of down trending ferritin       Follow Up:     -cbc, ferritin lab only in 3  months  -same labs and md appt in 6   months    Michael Woodson MD  Hematology/Oncology/Bone Marrow Transplant

## 2018-08-27 NOTE — TELEPHONE ENCOUNTER
VM left, returning patient's call. Pt needs to go to [facility] for eval/treatment d/t fall and extreme pain.

## 2018-08-27 NOTE — Clinical Note
-cbc,  ferritin to be drawn on 11/14 lab appt already scheduled on [facility]-same labs and MD appt in 6 months

## 2018-08-29 ENCOUNTER — PATIENT MESSAGE (OUTPATIENT)
Dept: RHEUMATOLOGY | Facility: CLINIC | Age: 58
End: 2018-08-29

## 2018-08-30 ENCOUNTER — HOSPITAL ENCOUNTER (OUTPATIENT)
Dept: RADIOLOGY | Facility: HOSPITAL | Age: 58
Discharge: HOME | End: 2018-08-30
Attending: INTERNAL MEDICINE
Payer: MEDICARE

## 2018-08-30 ENCOUNTER — OFFICE VISIT (OUTPATIENT)
Dept: INTERNAL MEDICINE | Facility: CLINIC | Age: 58
End: 2018-08-30
Payer: MEDICARE

## 2018-08-30 VITALS
HEIGHT: 61 IN | WEIGHT: 155.88 LBS | HEART RATE: 106 BPM | RESPIRATION RATE: 18 BRPM | DIASTOLIC BLOOD PRESSURE: 88 MMHG | TEMPERATURE: 98 F | SYSTOLIC BLOOD PRESSURE: 120 MMHG | BODY MASS INDEX: 29.43 KG/M2

## 2018-08-30 DIAGNOSIS — J20.9 ACUTE BRONCHITIS, UNSPECIFIED ORGANISM: ICD-10-CM

## 2018-08-30 DIAGNOSIS — J20.9 ACUTE BRONCHITIS, UNSPECIFIED ORGANISM: Primary | ICD-10-CM

## 2018-08-30 PROCEDURE — 71046 X-RAY EXAM CHEST 2 VIEWS: CPT | Mod: 26,,, | Performed by: RADIOLOGY

## 2018-08-30 PROCEDURE — 71046 X-RAY EXAM CHEST 2 VIEWS: CPT | Mod: TC,PO

## 2018-08-30 PROCEDURE — 3008F BODY MASS INDEX DOCD: CPT | Mod: CPTII,S$GLB,, | Performed by: INTERNAL MEDICINE

## 2018-08-30 PROCEDURE — 99214 OFFICE O/P EST MOD 30 MIN: CPT | Mod: S$GLB,,, | Performed by: INTERNAL MEDICINE

## 2018-08-30 PROCEDURE — 99999 PR PBB SHADOW E&M-EST. PATIENT-LVL V: CPT | Mod: PBBFAC,,, | Performed by: INTERNAL MEDICINE

## 2018-08-30 RX ORDER — AZITHROMYCIN 250 MG/1
TABLET, FILM COATED ORAL
Qty: 6 TABLET | Refills: 0 | Status: SHIPPED | OUTPATIENT
Start: 2018-08-30 | End: 2018-09-04

## 2018-08-30 RX ORDER — METHYLPREDNISOLONE 4 MG/1
TABLET ORAL
Qty: 1 PACKAGE | Refills: 0 | Status: SHIPPED | OUTPATIENT
Start: 2018-08-30 | End: 2018-10-15

## 2018-08-30 NOTE — PROGRESS NOTES
Subjective:       Patient ID: Joyce Peterson is a 57 y.o. female.    Chief Complaint: Bronchitis (used emerg. inhaler x 3 yesterday); Headache; Chest Pain; and Shortness of Breath    HPI   Pt here for f/u regarding URI. She was seen on 8/16 and started on a medrol pack, cough medication, restarted Flonase and continue on Astelin/antihistamine and inhalers. Pt was then seen on 8/20 and prescribed a Z-pack which helped slightly up until a few days ago. No fevers/chills. + dry cough with wheezing/SOB.   Review of Systems   Constitutional: Negative for activity change, appetite change, chills, diaphoresis, fatigue, fever and unexpected weight change.   HENT: Negative for postnasal drip, rhinorrhea, sinus pressure, sneezing, sore throat, trouble swallowing and voice change.    Respiratory: Positive for cough, shortness of breath and wheezing.    Cardiovascular: Negative for chest pain, palpitations and leg swelling.   Gastrointestinal: Negative for abdominal pain, blood in stool, constipation, diarrhea, nausea and vomiting.   Genitourinary: Negative for dysuria.   Musculoskeletal: Negative for arthralgias and myalgias.   Skin: Negative for rash and wound.   Allergic/Immunologic: Negative for environmental allergies and food allergies.   Hematological: Negative for adenopathy. Does not bruise/bleed easily.       Objective:      Physical Exam   Constitutional: She is oriented to person, place, and time. She appears well-developed and well-nourished. No distress.   HENT:   Head: Normocephalic and atraumatic.   Eyes: Conjunctivae and EOM are normal. Pupils are equal, round, and reactive to light. Right eye exhibits no discharge. Left eye exhibits no discharge. No scleral icterus.   Neck: Neck supple. No JVD present.   Cardiovascular: Normal rate, regular rhythm, normal heart sounds and intact distal pulses.   Pulmonary/Chest: Effort normal. No respiratory distress. She has wheezes (trace). She has no rales.    Musculoskeletal: She exhibits no edema.   Lymphadenopathy:     She has no cervical adenopathy.   Neurological: She is alert and oriented to person, place, and time.   Skin: Skin is warm and dry. No rash noted. She is not diaphoretic. No pallor.       Assessment:       1. Acute bronchitis, unspecified organism        Plan:    1. CXR       Refill Z-pack and Medrol Pack       Continue inhalers and cough medication

## 2018-08-31 ENCOUNTER — CLINICAL SUPPORT (OUTPATIENT)
Dept: REHABILITATION | Facility: HOSPITAL | Age: 58
End: 2018-08-31
Payer: MEDICARE

## 2018-08-31 DIAGNOSIS — M25.522 LEFT ELBOW PAIN: ICD-10-CM

## 2018-08-31 PROCEDURE — 97110 THERAPEUTIC EXERCISES: CPT | Mod: PO

## 2018-08-31 PROCEDURE — 97035 APP MDLTY 1+ULTRASOUND EA 15: CPT | Mod: PO

## 2018-08-31 NOTE — PROGRESS NOTES
"  Occupational Therapy Daily Treatment Note      Date: 8/31/2018  Name: Joyce Peterson  Clinic Number: [id number]    Medical Diagnosis: Left Lateral Epicondylosis, partial ECRB tear, Grade II RCL sprain, partial triceps tear vs tendinosis, olecranon bursitis; Right subacromial bursitis   Date of Onset: 2 months ago for lateral epicondylitis; 3 weeks ago for elbow injury  Date of Surgery: N/A  Surgical Procedure: N/A  Therapy Diagnosis:        Encounter Diagnosis   Name Primary?   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  Previous Therapy: Previous OT for RA in hands and for tennis elbow in 2017     Physician: Meagan Stinson PA-C  Physician Orders: 2x/wk x 6 wks; Eval and treat, ROM (A/AA/P), modalities ok prn, HEP, pain control prior to strengthening     Evaluation Date: 6/20/2018  Plan of Care Certification Period: 8/9/2018-9/28/2018     Visit #: 9/ Visits authorized: 50  Insurance Authorization period Expiration: 12/31/2018     Time In: 8:25am  Time Out: 9:25am  Total Billable Time: 60min  Charges for this Visit: US x 1, TE x 2      Subjective     Pt reports/functional improvements: "My shoulder has been getting better as I do my exercises. My elbow is hurting because I fell and hit it on the window ledge. I have big bruises now."  Response to previous treatment: Pt was compliant with home exercise program given last session.   Pain Scale:  3/10 on VAS at left elbow; 4/10 on VAS at right shoulder                Objective     Observation/Appearance: " Bruising present circumferentially through upper arm into FA. Increased edema present.    Shoulder ROM. Measured in degrees.    8/9/2018 8/9/2018     Left Right    Shoulder Ext 60 60   Shoulder Flex  155 155   Shoulder  155   Shoulder IR  At 90* 66 75   Shoulder ER   At 90* 84 75         Elbow and Wrist ROM. Measured in degrees.    6/20/2018 6/20/2018 7/13/2018        Left Right  Left     Elbow Ext/Flex 2/151 0/153  0/140 (+2/-11)     Supination/Pronation 60/90 55/90  66/90 (+11/=)     Wrist Ext/Flex WNL WNL WNL      Wrist RD/UD WNL WNL  WNL           Strength (Dyanmometer) and Pinch Strength (Pinch Gauge)  Measured in pounds.    6/20/2018 7/13/208 7/13/2018 8/9/2018        Left/Right Left  Right  Left   Rung II   Elbow at 90*  To point of pain defer No increase in pain with max  N/A    Rung II   Elbow at 90*  Max  45 54 49   Rung II  Elbow extended  To point of pain  Increase in pain noted by holding dynamometer upsupported N/A    Rung II  Elbow extended  Max  32    52 35    Key Pinch    11 12     3pt Pinch    11  13     2pt Pinch    10  12            Manual Muscle Test   8/9/2018 8/9/2018    Left Right   Shoulder Extension  4 4   Shoulder Flexion  5 5   Shoulder ABD 5 3+   Shoulder IR 3+ 4   Shoulder ER 3 3   Elbow Extension 3 4   Elbow Flexion 4 4          Elbow --- CMS Impairment/Limitation/Restriction for FOTO Survey  Therapist reviewed FOTO scores for Joyce Peterson  FOTO documents entered into EPIC - see Media section.     Intake Limitation Score: 57%  5th Visit Limitation Score: 57%  Category: Carrying     Current : CK = at least 40% but < 60% impaired, limited or restricted  Goal: CJ = at least 20% but < 40% impaired, limited or restricted  Discharge: TBD      Shoulder --- CMS Impairment/Limitation/Restriction for FOTO Survey  Therapist reviewed FOTO scores for Joyce Peterson  FOTO documents entered into EPIC - see Media section.     Intake Limitation Score: 53%    Category:  Carrying     Current : CK = at least 40% but < 60% impaired, limited or restricted  Goal: CJ = at least 20% but < 40% impaired, limited or restricted  Discharge: TBD       Treatment     Joyce received the following supervised modalities after being cleared for contradictions for 10 minutes:   MHP applied to left elbow and right shoulder for increased circulation and increased tissue elasticity prior to therapeutic exercises/activities.    Joyce received the following direct contact modalities after being cleared for contraindications for 16 minutes:  Pt received ultrasound at 3Mhz, 100% duty cycle, and 1.0 w/cm2 for 8 minutes to lateral epicondyle region and 8 minutes to the triceps insertion area for decreased pain and increased blood flow.    Joyce received the following manual therapy techniques for 5 minutes:   Deep friction massage to dorsal FA, lateral epicondyle region, and triceps insertion region for increased blood flow and decreased pain.   Gentle passive stretch on wedge with elbow extended: wrist flexion and extension x 1 min hold x 2 reps each     Joyce received therapeutic exercises for 29 minutes including:  AROM elbow 3 ways, supination/pronation.  Wrist ext stretch and wrist flexion stretch with elbow straight x30 sec hold; x 3 reps each way  Triceps stretch x 30 sec hold x 5 reps -defer 2* pain   Wrist PREs 1# x 30 reps each way --defer 2* pain   Elbow PREs 2# x 15 reps each way --defer 2* pain   Overhead Triceps 1# x 15 reps --defer 2* pain   Nirchels positions 1 and 2: Pink sponge squeezes x 10 reps each way - -defer 2* pain   Red TB rows 2 x 15 reps each - single arm lat pulls, ER and IR arm by side, ER with arm at 90* in thrower's position    Applied kinesiotape edema reduction strips along FA heading proximally to aid with circulation and blood flow. Issued tubigrip sleeve for FA.    Home Exercises and Education Provided     Education provided: Re-educated pt on wear schedule, care, and  precautions of tape.   - Progress towards goals   - Role of therapy, goals for therapy  No spiritual or educational barriers to learning provided    Written Home Exercises Provided: Continue previously issued HEP within a pain-free range for elbow. Initiated shoulder HEP.   Exercises were reviewed and Joyce was able to demonstrate them prior to the end of the session.   Pt received a written copy of exercises to perform at home (see media.)    Joyce demonstrated good  understanding of the education provided.     Assessment     Joyce Peterson is a 57 y.o. female referred to outpatient occupational/hand therapy and presents with a medical diagnosis of Left lateral epicondylitis and left RCL Grade II sprain. Patient has been out of therapy for a few visits due to bronchitis. Shoulder pain has resolved slightly with addition of strengthening HEP. Patient suffered a fall and now has increased edema with bruising at elbow. End range elbow extension limited due to edema at dorsal elbow. Kinesiotape used to aid with edema reduction.     Joyce is progressing well towards her goals and upgrades were made to include focus on shoulder. Plan of care also updated as duration expectation is longer with additional body part. Pt prognosis continues as Excellent. Pt will continue to benefit from skilled outpatient occupational therapy to address the deficits listed in the problem list on initial evaluation, provide pt/family education and to maximize pt's level of independence in the home and community environment.      Anticipated barriers to continued occupational therapy: none     Pt's spiritual, cultural and educational needs considered and pt agreeable to plan of care and goals.       Goals      New Goals:    Goals; to be met by discharge.  #1: Pt will report a pain level of 1 out of 10 at rest for both elbow and shoulder.   #2: Pt will demo improved elbow FOTO score by 20 points and improved shoulder FOTO by 10 points.  .   #3: Pt will demo increased left  strength by 10# total to aid with heavy household chores.  #4: Pt will increase shoulder ER to 4 out of 5 MMT to aid with retrieving and placing items at diagonal shoulder level.   #5: Pt will demo independence with raising/lowering 2# grocery items above shoulder level.        Discussed Plan of Care with patient: Yes  Updates/Grading for next session: Advance as tolerated     Kymberly Rodriguez, RAJEEV, JESSE, CHT

## 2018-09-04 DIAGNOSIS — J45.20 CHRONIC ASTHMA, MILD INTERMITTENT, UNCOMPLICATED: ICD-10-CM

## 2018-09-04 RX ORDER — ALBUTEROL SULFATE 90 UG/1
2 AEROSOL, METERED RESPIRATORY (INHALATION) EVERY 6 HOURS PRN
Qty: 18 G | Refills: 11 | Status: SHIPPED | OUTPATIENT
Start: 2018-09-04 | End: 2019-01-07 | Stop reason: SDUPTHER

## 2018-09-04 RX ORDER — BUDESONIDE AND FORMOTEROL FUMARATE DIHYDRATE 160; 4.5 UG/1; UG/1
2 AEROSOL RESPIRATORY (INHALATION) EVERY 12 HOURS
Qty: 3 INHALER | Refills: 3 | Status: SHIPPED | OUTPATIENT
Start: 2018-09-04 | End: 2019-10-08 | Stop reason: SDUPTHER

## 2018-09-04 RX ORDER — MONTELUKAST SODIUM 10 MG/1
10 TABLET ORAL NIGHTLY
Qty: 90 TABLET | Refills: 3 | Status: SHIPPED | OUTPATIENT
Start: 2018-09-04 | End: 2018-10-04

## 2018-09-05 ENCOUNTER — PATIENT MESSAGE (OUTPATIENT)
Dept: ORTHOPEDICS | Facility: CLINIC | Age: 58
End: 2018-09-05

## 2018-09-18 DIAGNOSIS — K31.84 GASTROPARESIS: ICD-10-CM

## 2018-09-18 RX ORDER — SUCRALFATE 1 G/1
1 TABLET ORAL 4 TIMES DAILY
Qty: 360 TABLET | Refills: 3 | Status: SHIPPED | OUTPATIENT
Start: 2018-09-18 | End: 2019-09-29 | Stop reason: SDUPTHER

## 2018-09-24 ENCOUNTER — TELEPHONE (OUTPATIENT)
Dept: RHEUMATOLOGY | Facility: CLINIC | Age: 58
End: 2018-09-24

## 2018-09-24 ENCOUNTER — TELEPHONE (OUTPATIENT)
Dept: OPTOMETRY | Facility: CLINIC | Age: 58
End: 2018-09-24

## 2018-09-24 DIAGNOSIS — M06.9 RHEUMATOID ARTHRITIS, INVOLVING UNSPECIFIED SITE, UNSPECIFIED RHEUMATOID FACTOR PRESENCE: ICD-10-CM

## 2018-09-24 RX ORDER — PREDNISONE 1 MG/1
3 TABLET ORAL DAILY
Qty: 90 TABLET | Refills: 1 | Status: SHIPPED | OUTPATIENT
Start: 2018-09-24 | End: 2018-09-25

## 2018-09-24 RX ORDER — PREDNISONE 5 MG/1
5 TABLET ORAL DAILY
Qty: 30 TABLET | Refills: 0 | Status: SHIPPED | OUTPATIENT
Start: 2018-09-24 | End: 2018-09-24

## 2018-09-24 NOTE — TELEPHONE ENCOUNTER
Ronel, please find out how much prednisone she is taking. Dr HANSON refilled prednisone 5mg daily which is likely old Rx, she is supposed to be on three 1mg tabs daily. New Rx for this strength sent. Thanks MEGA

## 2018-09-24 NOTE — TELEPHONE ENCOUNTER
Got refill request for 5 mg prednisone  Notes didn't say she takes it  So sent only 30 tabs  Called her and left a message explaining to her why I did that  If she has questions she will call you

## 2018-09-25 ENCOUNTER — HOSPITAL ENCOUNTER (OUTPATIENT)
Dept: RADIOLOGY | Facility: OTHER | Age: 58
Discharge: HOME | End: 2018-09-25
Attending: ORTHOPAEDIC SURGERY
Payer: MEDICARE

## 2018-09-25 ENCOUNTER — OFFICE VISIT (OUTPATIENT)
Dept: ORTHOPEDICS | Facility: CLINIC | Age: 58
End: 2018-09-25
Payer: MEDICARE

## 2018-09-25 VITALS
HEIGHT: 61 IN | HEART RATE: 93 BPM | SYSTOLIC BLOOD PRESSURE: 120 MMHG | DIASTOLIC BLOOD PRESSURE: 78 MMHG | BODY MASS INDEX: 29.27 KG/M2 | WEIGHT: 155 LBS

## 2018-09-25 DIAGNOSIS — S59.909A ELBOW INJURY, INITIAL ENCOUNTER: ICD-10-CM

## 2018-09-25 DIAGNOSIS — S59.909A ELBOW INJURY, INITIAL ENCOUNTER: Primary | ICD-10-CM

## 2018-09-25 PROCEDURE — 99215 OFFICE O/P EST HI 40 MIN: CPT | Mod: PBBFAC,25 | Performed by: ORTHOPAEDIC SURGERY

## 2018-09-25 PROCEDURE — 73080 X-RAY EXAM OF ELBOW: CPT | Mod: 26,LT,, | Performed by: RADIOLOGY

## 2018-09-25 PROCEDURE — 73080 X-RAY EXAM OF ELBOW: CPT | Mod: TC,FY,LT

## 2018-09-25 PROCEDURE — 99999 PR PBB SHADOW E&M-EST. PATIENT-LVL V: CPT | Mod: PBBFAC,,, | Performed by: ORTHOPAEDIC SURGERY

## 2018-09-25 PROCEDURE — 99214 OFFICE O/P EST MOD 30 MIN: CPT | Mod: S$PBB,,, | Performed by: ORTHOPAEDIC SURGERY

## 2018-09-25 PROCEDURE — 3008F BODY MASS INDEX DOCD: CPT | Mod: CPTII,,, | Performed by: ORTHOPAEDIC SURGERY

## 2018-09-25 RX ORDER — PREDNISONE 5 MG/1
5 TABLET ORAL DAILY
Qty: 30 TABLET | Refills: 3 | Status: SHIPPED | OUTPATIENT
Start: 2018-09-25 | End: 2018-10-25

## 2018-09-25 RX ORDER — DIAZEPAM 5 MG/1
5 TABLET ORAL ONCE
Qty: 1 TABLET | Refills: 0 | Status: ON HOLD | OUTPATIENT
Start: 2018-09-25 | End: 2018-10-17 | Stop reason: HOSPADM

## 2018-09-26 ENCOUNTER — INFUSION (OUTPATIENT)
Dept: INFECTIOUS DISEASES | Facility: HOSPITAL | Age: 58
End: 2018-09-26
Attending: INTERNAL MEDICINE
Payer: MEDICARE

## 2018-09-26 VITALS — WEIGHT: 155 LBS | BODY MASS INDEX: 29.27 KG/M2 | HEIGHT: 61 IN

## 2018-09-26 DIAGNOSIS — M85.80 OSTEOPENIA, UNSPECIFIED LOCATION: Primary | ICD-10-CM

## 2018-09-26 PROCEDURE — 63600175 PHARM REV CODE 636 W HCPCS: Mod: JG | Performed by: INTERNAL MEDICINE

## 2018-09-26 PROCEDURE — 96372 THER/PROPH/DIAG INJ SC/IM: CPT

## 2018-09-26 RX ADMIN — DENOSUMAB 60 MG: 60 INJECTION SUBCUTANEOUS at 10:09

## 2018-09-27 LAB — HPV OTHER HR TYPES: NEGATIVE

## 2018-10-01 ENCOUNTER — HOSPITAL ENCOUNTER (OUTPATIENT)
Dept: RADIOLOGY | Facility: HOSPITAL | Age: 58
Discharge: HOME | End: 2018-10-01
Attending: ORTHOPAEDIC SURGERY
Payer: MEDICARE

## 2018-10-01 DIAGNOSIS — S59.909A ELBOW INJURY, INITIAL ENCOUNTER: ICD-10-CM

## 2018-10-01 PROCEDURE — 73221 MRI JOINT UPR EXTREM W/O DYE: CPT | Mod: 26,LT,, | Performed by: RADIOLOGY

## 2018-10-01 PROCEDURE — 73221 MRI JOINT UPR EXTREM W/O DYE: CPT | Mod: TC,LT

## 2018-10-05 ENCOUNTER — TELEPHONE (OUTPATIENT)
Dept: OPTOMETRY | Facility: CLINIC | Age: 58
End: 2018-10-05

## 2018-10-09 ENCOUNTER — OFFICE VISIT (OUTPATIENT)
Dept: ORTHOPEDICS | Facility: CLINIC | Age: 58
End: 2018-10-09
Payer: MEDICARE

## 2018-10-09 VITALS
SYSTOLIC BLOOD PRESSURE: 124 MMHG | HEART RATE: 103 BPM | WEIGHT: 155 LBS | DIASTOLIC BLOOD PRESSURE: 87 MMHG | BODY MASS INDEX: 29.27 KG/M2 | HEIGHT: 61 IN

## 2018-10-09 DIAGNOSIS — S46.312D TRICEPS TENDON RUPTURE, LEFT, SUBSEQUENT ENCOUNTER: Primary | ICD-10-CM

## 2018-10-09 PROCEDURE — 99214 OFFICE O/P EST MOD 30 MIN: CPT | Mod: S$PBB,,, | Performed by: ORTHOPAEDIC SURGERY

## 2018-10-09 PROCEDURE — 99999 PR PBB SHADOW E&M-EST. PATIENT-LVL IV: CPT | Mod: PBBFAC,,, | Performed by: ORTHOPAEDIC SURGERY

## 2018-10-09 PROCEDURE — 99214 OFFICE O/P EST MOD 30 MIN: CPT | Mod: PBBFAC | Performed by: ORTHOPAEDIC SURGERY

## 2018-10-09 PROCEDURE — 3008F BODY MASS INDEX DOCD: CPT | Mod: CPTII,,, | Performed by: ORTHOPAEDIC SURGERY

## 2018-10-10 ENCOUNTER — TELEPHONE (OUTPATIENT)
Dept: OPHTHALMOLOGY | Facility: CLINIC | Age: 58
End: 2018-10-10

## 2018-10-10 ENCOUNTER — PATIENT MESSAGE (OUTPATIENT)
Dept: RHEUMATOLOGY | Facility: CLINIC | Age: 58
End: 2018-10-10

## 2018-10-10 ENCOUNTER — TELEPHONE (OUTPATIENT)
Dept: ORTHOPEDICS | Facility: CLINIC | Age: 58
End: 2018-10-10

## 2018-10-10 DIAGNOSIS — S46.312A RUPTURE OF LEFT TRICEPS TENDON, INITIAL ENCOUNTER: ICD-10-CM

## 2018-10-10 DIAGNOSIS — M25.522 LEFT ELBOW PAIN: Primary | ICD-10-CM

## 2018-10-10 NOTE — TELEPHONE ENCOUNTER
----- Message from Fabian Anderson sent at 10/10/2018  8:48 AM CDT -----  Contact: Joyce Peterson            Name of Who is Calling: Joyce Peterson      What is the request in detail: Patient called in regards to changing her surgery date      Can the clinic reply by [facility]: No      [facility] [phone]

## 2018-10-10 NOTE — TELEPHONE ENCOUNTER
----- Message from Sreedhar Parish sent at 10/10/2018  8:54 AM CDT -----  Contact: Joyce  Rx Refill/Request     Is this a Refill or New Rx:    Rx Name and Strength:  RESTASIS 0.05 % ophthalmic emulsion  Preferred Pharmacy with phone number: [facility]/pharmacy #[id number] - [place] - [address] [facility]  [address] [facility] [place] [address]  Phone: [phone] Fax: [phone]  Not a 24 hour pharmacy; exact hours not known      Communication Preference:[phone]    Additional Information: Ms. Peterson states that she has bee trying to get a refill for 3 to 5 days

## 2018-10-11 ENCOUNTER — PATIENT MESSAGE (OUTPATIENT)
Dept: SURGERY | Facility: HOSPITAL | Age: 58
End: 2018-10-11

## 2018-10-12 NOTE — H&P
"Subjective:       Patient ID: Joyce Peterson is a 57 y.o. female.     Chief Complaint: Pain of the Left Elbow        HPI  AT last visit:  Joyce Peterson is a 57 y.o. female presenting today for MRI follow of L elbow. Per history patient was helping her  carry a heavy fan when she heard and felt a "pop" in the left elbow 3 weeks ago . She had immediate severe pain in the elbow.Pt notes hx of left tennis elbow which began about 2 mos ago. She did have elbow pain from this prior to injury however pain has greatly increased and there is new elbow swelling.        She has been in a hinged elbow brace at 70 (unable to tolerate 90) for several weeks. She does not wear this at night. Reports pain and stiffness worsening with brace.      Today: Pain has improved but not gone. Now in compression sleeve- elbow strap made it worse. Pt complete 6 weeks of OT.           Review of patient's allergies indicates:   Allergen Reactions    Actemra [tocilizumab] Other (See Comments)       Severe dizziness    Codeine Nausea And Vomiting    Gold au 198 Hives and Rash    Hydroxychloroquine Other (See Comments)       Can't remember the reaction       Iodinated contrast- oral and iv dye Other (See Comments)       Other reaction(s): BURNING ALL OVER    Iodine Other (See Comments)       Other reaction(s): BURNING ALL OVER - Iodine dye - Not topical    Sulfa (sulfonamide antibiotics) Other (See Comments)       Can't remember the reaction    Zofran [ondansetron hcl (pf)] Nausea And Vomiting       Pt reports that last time she received zofran she started vomiting again    Methotrexate analogues Nausea Only    Pneumovax 23 [pneumococcal 23-argenis ps vaccine] Other (See Comments)       "sick"          Current Medications          Current Outpatient Prescriptions   Medication Sig Dispense Refill    albuterol 90 mcg/actuation inhaler Inhale 2 puffs into the lungs every 6 (six) hours as needed for Wheezing. 18 g 11    aspirin " 81 mg Tab Take 81 mg by mouth every morning.         azelastine (ASTELIN) 137 mcg nasal spray 1 spray (137 mcg total) by Nasal route 2 (two) times daily. (Patient taking differently: 1 spray by Nasal route 2 (two) times daily as needed. ) 30 mL 11    budesonide-formoterol 160-4.5 mcg (SYMBICORT) 160-4.5 mcg/actuation HFAA Inhale 2 puffs into the lungs every 12 (twelve) hours. 3 Inhaler 3    calcium citrate-vitamin D (CITRACAL + D) 315-200 mg-unit per tablet Take 1 tablet by mouth 2 (two) times daily.         fish oil-omega-3 fatty acids 300-1,000 mg capsule Take 1,400 g by mouth once daily.        flaxseed oil 1,000 mg Cap Take by mouth once daily.        fluticasone (FLONASE) 50 mcg/actuation nasal spray          folic acid (FOLVITE) 1 MG tablet Take 1 tablet (1 mg total) by mouth once daily. 90 tablet 1    GUAIFENESIN (MUCINEX ORAL) Take 400 mg by mouth every 4 (four) hours as needed.         INV PROPULSID 10 MG Take 20 mg by mouth 4 (four) times daily. FOR INVESTIGATIONAL USE ONLY. Protocol [id number] each 2    leflunomide (ARAVA) 20 MG Tab TAKE 1 TABLET (20 MG TOTAL) BY MOUTH ONCE DAILY. 90 tablet 0    methotrexate 25 mg/mL injection Inject 0.2 mLs (5 mg total) into the skin every 7 days. 2 mL 0    montelukast (SINGULAIR) 10 mg tablet Take 1 tablet (10 mg total) by mouth nightly. 90 tablet 3    naproxen (NAPROSYN) 500 MG tablet Take 1 tablet (500 mg total) by mouth 2 (two) times daily as needed. 180 tablet 1    omeprazole (PRILOSEC) 40 MG capsule TAKE ONE CAPSULE BY MOUTH EVERY MORNING 30 capsule 6    omeprazole-sodium bicarbonate (ZEGERID) 40-1.1 mg-gram per capsule Take 1 capsule by mouth every morning. 90 capsule 3    POTASSIUM ORAL Take by mouth once daily.        predniSONE (DELTASONE) 1 MG tablet Take 3 tablets (3 mg total) by mouth once daily. 270 tablet 1    progesterone (PROMETRIUM) 100 MG capsule Take 100 mg by mouth every morning. 1 Capsule Oral Every day, morning         "RESTASIS 0.05 % ophthalmic emulsion          rizatriptan (MAXALT) 10 MG tablet Take 10 mg by mouth as needed for Migraine.         rosuvastatin (CRESTOR) 20 MG tablet TAKE 1 TABLET EVERY DAY 90 tablet 3    sucralfate (CARAFATE) 1 gram tablet Take 1 tablet (1 g total) by mouth 4 (four) times daily. 360 tablet 3    syringe with needle (TUBERCULIN SYRINGE) 1 mL 27 x 1/2" Syrg To administer methotrexate 7.5mg sc once a week 12 Syringe 3    tramadol (ULTRAM) 50 mg tablet TAKE 1 TABLET DAILY AS NEEDED PAIN 30 tablet 0    trospium (SANCTURA) 20 mg Tab tablet Take 1 tablet (20 mg total) by mouth 2 (two) times daily. 60 tablet 11    valACYclovir (VALTREX) 1000 MG tablet TAKE 1 TABLET BY MOUTH TWICE A DAY AS NEEDED 20 tablet 3    XELJANZ XR 11 mg Tb24 TAKE ONE TABLET (11 MG) BY MOUTH ONCE DAILY. MAY BE TAKEN WITH OR WITHOUT FOOD. SWALLOW TABLET WHOLE. DO NOT CRUSH, SPLIT OR CHEW. STORE AT 90 tablet 0    conjugated estrogens (PREMARIN) vaginal cream Place 0.5 g vaginally 3 (three) times a week. 30 g 3      No current facility-administered medications for this visit.                  Past Medical History:   Diagnosis Date    Acid reflux      Allergy      Anemia      Asthma      Coronary artery disease      Degenerative disc disease      Dry eyes      Dry mouth      Gastroparesis      Hyperlipidemia      Lateral meniscus derangement 4/6/2016    Lobular carcinoma in situ      Osteoarthritis      Osteoporosis      Rheumatoid arthritis(714.0)      Umbilical hernia 8/13/2015               Past Surgical History:   Procedure Laterality Date    BREAST BIOPSY Left 01/29/2002     core bx    CARPAL TUNNEL RELEASE Right 05/2017    CHOLECYSTECTOMY   2004    COLONOSCOPY         10/11    COLONOSCOPY N/A 6/29/2017     Procedure: COLONOSCOPY;  Surgeon: López Moore MD;  Location: [facility] ([address] [place] [address]);  Service: Endoscopy;  Laterality: N/A;    TONSILLECTOMY        TUBAL LIGATION   2003    UPPER GASTROINTESTINAL " "ENDOSCOPY         10/11    uterine ablation   2003         Review of Systems:  Constitutional: Negative for chills and fever.   Respiratory: Negative for cough and shortness of breath.    Gastrointestinal: Negative for nausea and vomiting.   Skin: Negative for rash.   Neurological: Negative for dizziness and headaches.   Psychiatric/Behavioral: Negative for depression.   MSK as in HPI         OBJECTIVE:      PHYSICAL EXAM:  /84   Pulse 102   Ht 5' 1" (1.549 m)   Wt 69.7 kg (153 lb 10.6 oz)   LMP  (LMP Unknown)   BMI 29.03 kg/m²      GEN:  NAD, well-developed, well-groomed.  NEURO: Awake, alert, and oriented. Normal attention and concentration.    PSYCH: Normal mood and affect. Behavior is normal.  HEENT: No cervical lymphadenopathy noted.  CARDIOVASCULAR: Radial pulses 2+ bilaterally. No LE edema noted.  PULMONARY: Breath sounds normal. No respiratory distress.  SKIN: Intact, no rashes.       MSK:   LUE:  Good active ROM of the wrist and fingers. Good elbow motion  TTP lateral epicondyle and olecranon  No varus instability noted on exam  + pain with resisted wrist extension  AIN/PIN/Radial/Median/Ulnar Nerves assessed in isolation without deficit.  Radial & Ulnar arteries palpated 2+.   Capillary Refill <3s.        RADIOGRAPHS:  Xray left elbow 6/4/18  FINDINGS:  No acute fracture.  Degenerative osteophyte spurring is present the radial head.  There is mild soft tissue prominence overlying the olecranon consistent with olecranon bursitis.  No large joint effusion.     Comments: I have personally reviewed the imaging and I agree with the above radiologist's report.     MRI: Near complete insertional tear of the triceps with marked surrounding edema and fluid.     ASSESSMENT/PLAN:      57 yo female with Left acute triceps rupture      Plan:     Treatment options explained in great detail with the patient. She wishes to proceed with left triceps repair/reconstruction. Consents signed in clinic.       "

## 2018-10-14 DIAGNOSIS — H04.123 BILATERAL DRY EYES: Primary | ICD-10-CM

## 2018-10-14 RX ORDER — CYCLOSPORINE 0.5 MG/ML
1 EMULSION OPHTHALMIC 2 TIMES DAILY
Qty: 60 VIAL | Refills: 5 | Status: SHIPPED | OUTPATIENT
Start: 2018-10-14 | End: 2018-11-13

## 2018-10-15 ENCOUNTER — RESEARCH ENCOUNTER (OUTPATIENT)
Dept: RESEARCH | Facility: HOSPITAL | Age: 58
End: 2018-10-15
Payer: MEDICARE

## 2018-10-15 ENCOUNTER — OFFICE VISIT (OUTPATIENT)
Dept: GASTROENTEROLOGY | Facility: CLINIC | Age: 58
End: 2018-10-15
Payer: MEDICARE

## 2018-10-15 ENCOUNTER — PATIENT MESSAGE (OUTPATIENT)
Dept: SURGERY | Facility: HOSPITAL | Age: 58
End: 2018-10-15

## 2018-10-15 ENCOUNTER — PATIENT MESSAGE (OUTPATIENT)
Dept: RHEUMATOLOGY | Facility: CLINIC | Age: 58
End: 2018-10-15

## 2018-10-15 ENCOUNTER — HOSPITAL ENCOUNTER (OUTPATIENT)
Dept: CARDIOLOGY | Facility: CLINIC | Age: 58
Discharge: HOME | End: 2018-10-15
Payer: MEDICARE

## 2018-10-15 ENCOUNTER — CLINICAL SUPPORT (OUTPATIENT)
Dept: RHEUMATOLOGY | Facility: CLINIC | Age: 58
End: 2018-10-15
Payer: MEDICARE

## 2018-10-15 VITALS
HEART RATE: 101 BPM | WEIGHT: 155.19 LBS | SYSTOLIC BLOOD PRESSURE: 113 MMHG | HEIGHT: 61 IN | BODY MASS INDEX: 29.3 KG/M2 | DIASTOLIC BLOOD PRESSURE: 78 MMHG

## 2018-10-15 VITALS
SYSTOLIC BLOOD PRESSURE: 113 MMHG | HEIGHT: 61 IN | DIASTOLIC BLOOD PRESSURE: 78 MMHG | HEART RATE: 101 BPM | WEIGHT: 155.19 LBS | BODY MASS INDEX: 29.3 KG/M2

## 2018-10-15 DIAGNOSIS — K31.84 GASTROPARESIS: ICD-10-CM

## 2018-10-15 DIAGNOSIS — Z00.6 RESEARCH STUDY PATIENT: ICD-10-CM

## 2018-10-15 DIAGNOSIS — K31.84 GASTROPARESIS: Primary | ICD-10-CM

## 2018-10-15 DIAGNOSIS — M06.9 RHEUMATOID ARTHRITIS, INVOLVING UNSPECIFIED SITE, UNSPECIFIED RHEUMATOID FACTOR PRESENCE: Primary | ICD-10-CM

## 2018-10-15 DIAGNOSIS — B37.81 ESOPHAGEAL CANDIDIASIS: ICD-10-CM

## 2018-10-15 PROCEDURE — 99215 OFFICE O/P EST HI 40 MIN: CPT | Mod: S$PBB,,, | Performed by: INTERNAL MEDICINE

## 2018-10-15 PROCEDURE — 93010 ELECTROCARDIOGRAM REPORT: CPT | Mod: S$PBB,,, | Performed by: INTERNAL MEDICINE

## 2018-10-15 PROCEDURE — 93005 ELECTROCARDIOGRAM TRACING: CPT | Mod: PBBFAC | Performed by: INTERNAL MEDICINE

## 2018-10-15 PROCEDURE — 99999 PR PBB SHADOW E&M-EST. PATIENT-LVL III: CPT | Mod: PBBFAC,,, | Performed by: INTERNAL MEDICINE

## 2018-10-15 PROCEDURE — 99213 OFFICE O/P EST LOW 20 MIN: CPT | Mod: PBBFAC | Performed by: INTERNAL MEDICINE

## 2018-10-15 PROCEDURE — 3008F BODY MASS INDEX DOCD: CPT | Mod: CPTII,,, | Performed by: INTERNAL MEDICINE

## 2018-10-15 PROCEDURE — 99213 OFFICE O/P EST LOW 20 MIN: CPT | Mod: PBBFAC,25,27

## 2018-10-15 PROCEDURE — 99999 PR PBB SHADOW E&M-EST. PATIENT-LVL III: CPT | Mod: PBBFAC,,,

## 2018-10-15 PROCEDURE — 96372 THER/PROPH/DIAG INJ SC/IM: CPT | Mod: PBBFAC

## 2018-10-15 RX ORDER — FLUCONAZOLE 100 MG/1
100 TABLET ORAL DAILY
Qty: 14 TABLET | Refills: 3 | Status: SHIPPED | OUTPATIENT
Start: 2018-10-15 | End: 2019-02-05 | Stop reason: SDUPTHER

## 2018-10-15 RX ORDER — TRIAMCINOLONE ACETONIDE 40 MG/ML
40 INJECTION, SUSPENSION INTRA-ARTICULAR; INTRAMUSCULAR
Status: COMPLETED | OUTPATIENT
Start: 2018-10-15 | End: 2018-10-15

## 2018-10-15 RX ADMIN — TRIAMCINOLONE ACETONIDE 40 MG: 40 INJECTION, SUSPENSION INTRA-ARTICULAR; INTRAMUSCULAR at 01:10

## 2018-10-15 NOTE — PROGRESS NOTES
Subjective:       Patient ID: Joyce Peterson is a 58 y.o. female.    Chief Complaint: Follow-up (gastroparesis)    HPI  Review of Systems   Constitutional: Negative for activity change, appetite change, chills, diaphoresis, fatigue, fever and unexpected weight change.   HENT: Negative for congestion, ear pain, mouth sores, nosebleeds, postnasal drip, rhinorrhea, sinus pressure, sore throat, trouble swallowing and voice change.    Eyes: Negative for pain.   Respiratory: Negative for cough, shortness of breath and wheezing.    Cardiovascular: Negative for chest pain, palpitations and leg swelling.   Gastrointestinal: Positive for diarrhea.   Genitourinary: Negative for difficulty urinating, dysuria, flank pain, hematuria and menstrual problem.   Musculoskeletal: Positive for arthralgias, gait problem, joint swelling and myalgias. Negative for back pain and neck pain.   Skin: Negative for rash.   Neurological: Negative for dizziness, tremors, syncope, numbness and headaches.   Hematological: Negative for adenopathy. Does not bruise/bleed easily.   Psychiatric/Behavioral: Negative for agitation, behavioral problems, confusion, decreased concentration and dysphoric mood. The patient is not nervous/anxious.        Objective:      Physical Exam   Constitutional: She is oriented to person, place, and time. She appears well-developed and well-nourished. No distress.   HENT:   Head: Normocephalic and atraumatic.   Right Ear: External ear normal.   Left Ear: External ear normal.   Nose: Nose normal.   Mouth/Throat: Oropharynx is clear and moist. No oropharyngeal exudate.   Eyes: Conjunctivae are normal. Pupils are equal, round, and reactive to light. No scleral icterus.   Neck: Normal range of motion. Neck supple. No thyromegaly present.   Cardiovascular: Normal rate, regular rhythm, normal heart sounds and intact distal pulses. Exam reveals no gallop.   No murmur heard.  Pulmonary/Chest: Effort normal and breath sounds  normal. She has no wheezes. She has no rales.   Abdominal: Soft. Bowel sounds are normal. She exhibits no distension and no mass. There is no tenderness. There is no rebound and no guarding. No hernia.   Musculoskeletal: She exhibits deformity. She exhibits no edema or tenderness.   Lymphadenopathy:     She has no cervical adenopathy.   Neurological: She is alert and oriented to person, place, and time. No cranial nerve deficit.   Skin: Skin is warm and dry. No rash noted.   Psychiatric: She has a normal mood and affect. Her behavior is normal. Judgment and thought content normal.       Assessment:       1. Gastroparesis    2. Research study patient    3. Esophageal candidiasis        Plan:         HISTORY OF PRESENT ILLNESS:  This is a followup visit for Joyce.  A 58-year-old   lady with longstanding gastroparesis.  She is a participant in the Propulsid   compassionate trial.    From a gastroparesis standpoint, she is doing well.  She only always has the   early satiety, but she only has discomfort if she eats more than her usual   amount of food.  There has been no recent nausea and vomiting.  Overall, the   Propulsid has helped her significantly.  She feels like she has benefited from   it.  However, she does have to stop it fairly often right now because of other   medicines that conflict with it (she has been having a lot of problem with   candida infections and every time she goes on the Diflucan, she has to stop the   cisapride).  She has noticed yeast in her tongue and then sometimes in her   esophagus, which we documented by endoscopy a year ago and there are times where   she feels like it involves her stomach.  She says this in that she develops   dyspepsia and then it gets better when she goes on the Diflucan.  There is no   heartburn or reflux.  Her bowel movements have become different over the past   several weeks.  They are looser than they have been in the past.  Her stool in   the morning is soft  and then the subsequent ones are looser and then there is no   additional bowel movement after about noon each day.  She did have several   courses of antibiotics two months ago, but it is not a persistent watery   diarrhea.    There are changes being made in her immunosuppressive regimen for her rheumatoid   arthritis.  They stopped the methotrexate, which she felt like caused a lot of   side effects and they are stopping the Xeljanz as well.    ASSESSMENT AND DECISION MAKIN.  Gastroparesis, very difficult situation.  She has benefited from the   Propulsid, would like to continue the Propulsid, although because of its many   interactions, it becomes difficult to prescribe.  For instance, she has upcoming   surgery on her arm in two days.  I have asked her to stop taking her cisapride   right now just in case they need to give her preoperative antibiotics.  In that   way, they will not need to have any concerns about interactions with the   cisapride and any prolongation of QT.  I did review her EKG this morning and the   QT interval is acceptable in the EKG and her preliminary since looks fine as   well.  2.  Participation in research study.  I will do the paperwork involved to   continue her participation.  3.  Candidiasis, very difficult situation.  I think it is because of multi   factors.  It is the combined poor motility of the esophagus with her essentially   triple immunosuppression with steroids, methotrexate and Xeljanz.  Since we are   making some changes here, perhaps this will get better.  If not, I would   consider an Infectious Disease consult to see if there might be a better way to   treat this.    RECOMMENDATION:  1.  Consider ID.  2.  Consider C. diff testing  if the diarrhea worsens.  3.  Stop the cisapride now and restart after surgery.      EB  dd: 10/15/2018 12:09:31 (CDT)  td: 10/16/2018 06:43:30 (CDT)  Doc ID   #[id number]  Job ID #[id number]    CC:

## 2018-10-16 ENCOUNTER — TELEPHONE (OUTPATIENT)
Dept: ORTHOPEDICS | Facility: CLINIC | Age: 58
End: 2018-10-16

## 2018-10-16 DIAGNOSIS — M06.9 RHEUMATOID ARTHRITIS, INVOLVING UNSPECIFIED SITE, UNSPECIFIED RHEUMATOID FACTOR PRESENCE: ICD-10-CM

## 2018-10-16 RX ORDER — LEFLUNOMIDE 20 MG/1
20 TABLET ORAL DAILY
Qty: 90 TABLET | Refills: 0 | Status: SHIPPED | OUTPATIENT
Start: 2018-10-16 | End: 2019-01-11 | Stop reason: SDUPTHER

## 2018-10-16 NOTE — TELEPHONE ENCOUNTER
Spoke with pt , 7:00 am arrival time for surgery tomorrow with . [facility] , 1st floor check in. Pt verbalized understanding.

## 2018-10-16 NOTE — PROGRESS NOTES
CHIEF COMPLAINT:  MRI results left elbow.    HISTORY OF PRESENT ILLNESS:  Ms. Peterson is a 58-year-old female.  She had   sustained another injury to her left elbow.  She was referred for an MRI after   she states pain and swelling.  She is here today for those MRI results.  She is   still complaining of pain around her elbow and difficulty with extension.    Her MRI was reviewed and does show almost complete tear of her triceps.    PLAN:  At this time, we discussed surgical intervention.  Risks and benefits   were explained to the patient in clinic.  Consents signed in clinic.  The   patient understands the postoperative protocol, the bracing and therapy that   will be initiated after the triceps repair.  I also showed the patient how that   will be repaired using the Arth[employer] system.  All questions were answered.  The   patient will be scheduled for surgery.      LES/HN  dd: 10/15/2018 11:21:44 (CDT)  td: 10/16/2018 05:30:15 (CDT)  Doc ID   #[id number]  Job ID #[id number]    CC:

## 2018-10-16 NOTE — PRE-PROCEDURE INSTRUCTIONS
PreOp Instructions given:   - Verbal medication information (what to hold and what to take)   - NPO guidelines   - Arrival place directions given;   - Bathing with antibacterial soap   - Don't wear any jewelry or bring any valuables AM of surgery   - No makeup or moisturizer to face   - No perfume/cologne, powder, lotions or aftershave   Pt. verbalized understanding.   Denies any  history of side effects or issues with anesthesia or sedation.

## 2018-10-16 NOTE — PROGRESS NOTES
LIMITED ACCESS PROTOCOL FOR THE USE OF ORAL CISAPRIDE IN THE TREATMENT OF REFRACTORY GASTROESOPHAGEAL REFLUX DISEASE AND OTHER GASTROINTESTINAL MOTILITY DISORDERS - OMKAR MOORE MD  IRB #: [id number]   -  OMKAR MOORE MD     SubjID      Ms. Joyce Peterson was seen in the clinic for her semi annual 6-month follow-up visit. She stated that she is still receiving therapeutic benefit from Cisapride. She denied any new questions regarding the study or her participation and verbalized her willingness to continue.    Her lab work & EKG were completed on 10/15/2018 and reviewed by Dr. Moore prior to the visit.     Dr. Moore performed a physical exam and reviewed her chart since the last study contact and determined she is eligible to continue Cisapride. Since is is scheduled to have surgery next week, Dr. Moore advised Ms. Peterson to stop taking Cisapride until after her surgery.    Study staff at this visit was Theresa Quezada [facility]

## 2018-10-17 ENCOUNTER — ANESTHESIA EVENT (OUTPATIENT)
Dept: SURGERY | Facility: HOSPITAL | Age: 58
End: 2018-10-17
Payer: MEDICARE

## 2018-10-17 ENCOUNTER — ANESTHESIA (OUTPATIENT)
Dept: SURGERY | Facility: HOSPITAL | Age: 58
End: 2018-10-17
Payer: MEDICARE

## 2018-10-17 ENCOUNTER — HOSPITAL ENCOUNTER (OUTPATIENT)
Facility: HOSPITAL | Age: 58
Discharge: HOME | End: 2018-10-17
Attending: ORTHOPAEDIC SURGERY | Admitting: ORTHOPAEDIC SURGERY
Payer: MEDICARE

## 2018-10-17 VITALS
BODY MASS INDEX: 29.07 KG/M2 | HEIGHT: 61 IN | HEART RATE: 107 BPM | OXYGEN SATURATION: 96 % | TEMPERATURE: 98 F | RESPIRATION RATE: 16 BRPM | SYSTOLIC BLOOD PRESSURE: 130 MMHG | DIASTOLIC BLOOD PRESSURE: 72 MMHG | WEIGHT: 154 LBS

## 2018-10-17 DIAGNOSIS — S46.312A RUPTURE OF LEFT TRICEPS TENDON, INITIAL ENCOUNTER: Primary | ICD-10-CM

## 2018-10-17 DIAGNOSIS — S46.312A: ICD-10-CM

## 2018-10-17 PROCEDURE — 63600175 PHARM REV CODE 636 W HCPCS: Performed by: ANESTHESIOLOGY

## 2018-10-17 PROCEDURE — 25000003 PHARM REV CODE 250: Performed by: ORTHOPAEDIC SURGERY

## 2018-10-17 PROCEDURE — 25000003 PHARM REV CODE 250: Performed by: ANESTHESIOLOGY

## 2018-10-17 PROCEDURE — 76942 ECHO GUIDE FOR BIOPSY: CPT | Performed by: STUDENT IN AN ORGANIZED HEALTH CARE EDUCATION/TRAINING PROGRAM

## 2018-10-17 PROCEDURE — D9220A PRA ANESTHESIA: Mod: ANES,,, | Performed by: ANESTHESIOLOGY

## 2018-10-17 PROCEDURE — 71000015 HC POSTOP RECOV 1ST HR: Performed by: ORTHOPAEDIC SURGERY

## 2018-10-17 PROCEDURE — 25000242 PHARM REV CODE 250 ALT 637 W/ HCPCS

## 2018-10-17 PROCEDURE — C1713 ANCHOR/SCREW BN/BN,TIS/BN: HCPCS | Performed by: ORTHOPAEDIC SURGERY

## 2018-10-17 PROCEDURE — 94761 N-INVAS EAR/PLS OXIMETRY MLT: CPT

## 2018-10-17 PROCEDURE — 24342 REPAIR OF RUPTURED TENDON: CPT | Mod: LT,,, | Performed by: ORTHOPAEDIC SURGERY

## 2018-10-17 PROCEDURE — 37000008 HC ANESTHESIA 1ST 15 MINUTES: Performed by: ORTHOPAEDIC SURGERY

## 2018-10-17 PROCEDURE — D9220A PRA ANESTHESIA: Mod: CRNA,,, | Performed by: NURSE ANESTHETIST, CERTIFIED REGISTERED

## 2018-10-17 PROCEDURE — 64416 NJX AA&/STRD BRCH PL NFS IMG: CPT | Mod: 59,LT,, | Performed by: ANESTHESIOLOGY

## 2018-10-17 PROCEDURE — 25000003 PHARM REV CODE 250: Performed by: NURSE ANESTHETIST, CERTIFIED REGISTERED

## 2018-10-17 PROCEDURE — 63600175 PHARM REV CODE 636 W HCPCS: Performed by: STUDENT IN AN ORGANIZED HEALTH CARE EDUCATION/TRAINING PROGRAM

## 2018-10-17 PROCEDURE — 36000708 HC OR TIME LEV III 1ST 15 MIN: Performed by: ORTHOPAEDIC SURGERY

## 2018-10-17 PROCEDURE — 27201423 OPTIME MED/SURG SUP & DEVICES STERILE SUPPLY: Performed by: ORTHOPAEDIC SURGERY

## 2018-10-17 PROCEDURE — 25000003 PHARM REV CODE 250: Performed by: STUDENT IN AN ORGANIZED HEALTH CARE EDUCATION/TRAINING PROGRAM

## 2018-10-17 PROCEDURE — 76942 ECHO GUIDE FOR BIOPSY: CPT | Mod: 26,,, | Performed by: ANESTHESIOLOGY

## 2018-10-17 PROCEDURE — 36000709 HC OR TIME LEV III EA ADD 15 MIN: Performed by: ORTHOPAEDIC SURGERY

## 2018-10-17 PROCEDURE — 63600175 PHARM REV CODE 636 W HCPCS: Performed by: NURSE ANESTHETIST, CERTIFIED REGISTERED

## 2018-10-17 PROCEDURE — 94640 AIRWAY INHALATION TREATMENT: CPT

## 2018-10-17 PROCEDURE — 63600175 PHARM REV CODE 636 W HCPCS: Performed by: ORTHOPAEDIC SURGERY

## 2018-10-17 PROCEDURE — 63600175 PHARM REV CODE 636 W HCPCS

## 2018-10-17 PROCEDURE — 71000016 HC POSTOP RECOV ADDL HR: Performed by: ORTHOPAEDIC SURGERY

## 2018-10-17 PROCEDURE — 64416 NJX AA&/STRD BRCH PL NFS IMG: CPT | Mod: 59 | Performed by: STUDENT IN AN ORGANIZED HEALTH CARE EDUCATION/TRAINING PROGRAM

## 2018-10-17 PROCEDURE — 37000009 HC ANESTHESIA EA ADD 15 MINS: Performed by: ORTHOPAEDIC SURGERY

## 2018-10-17 DEVICE — KIT SECONDARY FIX ACL PCL REP: Type: IMPLANTABLE DEVICE | Site: SHOULDER | Status: FUNCTIONAL

## 2018-10-17 RX ORDER — MAG HYDROX/ALUMINUM HYD/SIMETH 200-200-20
30 SUSPENSION, ORAL (FINAL DOSE FORM) ORAL ONCE
Status: DISCONTINUED | OUTPATIENT
Start: 2018-10-17 | End: 2018-10-18 | Stop reason: HOSPADM

## 2018-10-17 RX ORDER — SODIUM CHLORIDE 9 MG/ML
INJECTION, SOLUTION INTRAVENOUS CONTINUOUS
Status: DISCONTINUED | OUTPATIENT
Start: 2018-10-17 | End: 2022-02-01

## 2018-10-17 RX ORDER — SODIUM CHLORIDE 0.9 % (FLUSH) 0.9 %
3 SYRINGE (ML) INJECTION
Status: DISCONTINUED | OUTPATIENT
Start: 2018-10-17 | End: 2018-10-18 | Stop reason: HOSPADM

## 2018-10-17 RX ORDER — POLYETHYLENE GLYCOL 3350 17 G/17G
17 POWDER, FOR SOLUTION ORAL DAILY
Qty: 1581 G | Refills: 0 | Status: SHIPPED | OUTPATIENT
Start: 2018-10-17 | End: 2019-04-11

## 2018-10-17 RX ORDER — PROMETHAZINE HYDROCHLORIDE 12.5 MG/1
25 SUPPOSITORY RECTAL EVERY 6 HOURS PRN
Status: DISCONTINUED | OUTPATIENT
Start: 2018-10-17 | End: 2018-10-18 | Stop reason: HOSPADM

## 2018-10-17 RX ORDER — KETAMINE HYDROCHLORIDE 10 MG/ML
INJECTION, SOLUTION INTRAMUSCULAR; INTRAVENOUS
Status: DISCONTINUED | OUTPATIENT
Start: 2018-10-17 | End: 2018-10-17

## 2018-10-17 RX ORDER — FENTANYL CITRATE 50 UG/ML
100 INJECTION, SOLUTION INTRAMUSCULAR; INTRAVENOUS CONTINUOUS PRN
Status: DISCONTINUED | OUTPATIENT
Start: 2018-10-17 | End: 2018-10-18 | Stop reason: HOSPADM

## 2018-10-17 RX ORDER — ONDANSETRON 2 MG/ML
INJECTION INTRAMUSCULAR; INTRAVENOUS
Status: DISCONTINUED | OUTPATIENT
Start: 2018-10-17 | End: 2018-10-17

## 2018-10-17 RX ORDER — HYDROCODONE BITARTRATE AND ACETAMINOPHEN 10; 325 MG/1; MG/1
1 TABLET ORAL EVERY 4 HOURS PRN
Status: DISCONTINUED | OUTPATIENT
Start: 2018-10-17 | End: 2018-10-18 | Stop reason: HOSPADM

## 2018-10-17 RX ORDER — HYDROCODONE BITARTRATE AND ACETAMINOPHEN 5; 325 MG/1; MG/1
1 TABLET ORAL EVERY 4 HOURS PRN
Status: DISCONTINUED | OUTPATIENT
Start: 2018-10-17 | End: 2018-10-18 | Stop reason: HOSPADM

## 2018-10-17 RX ORDER — ROCURONIUM BROMIDE 10 MG/ML
INJECTION, SOLUTION INTRAVENOUS
Status: DISCONTINUED | OUTPATIENT
Start: 2018-10-17 | End: 2018-10-17

## 2018-10-17 RX ORDER — MIDAZOLAM HYDROCHLORIDE 1 MG/ML
0.5 INJECTION INTRAMUSCULAR; INTRAVENOUS
Status: DISCONTINUED | OUTPATIENT
Start: 2018-10-17 | End: 2018-10-18 | Stop reason: HOSPADM

## 2018-10-17 RX ORDER — CEFAZOLIN SODIUM 1 G/3ML
2 INJECTION, POWDER, FOR SOLUTION INTRAMUSCULAR; INTRAVENOUS
Status: COMPLETED | OUTPATIENT
Start: 2018-10-17 | End: 2018-10-17

## 2018-10-17 RX ORDER — MUPIROCIN 20 MG/G
1 OINTMENT TOPICAL 2 TIMES DAILY
Status: DISCONTINUED | OUTPATIENT
Start: 2018-10-17 | End: 2018-10-18 | Stop reason: HOSPADM

## 2018-10-17 RX ORDER — SODIUM CHLORIDE 0.9 % (FLUSH) 0.9 %
5 SYRINGE (ML) INJECTION
Status: DISCONTINUED | OUTPATIENT
Start: 2018-10-17 | End: 2018-10-18 | Stop reason: HOSPADM

## 2018-10-17 RX ORDER — DOCUSATE SODIUM 100 MG/1
100 CAPSULE, LIQUID FILLED ORAL 3 TIMES DAILY
Qty: 90 CAPSULE | Refills: 0 | Status: SHIPPED | OUTPATIENT
Start: 2018-10-17 | End: 2019-07-15

## 2018-10-17 RX ORDER — KETAMINE HCL IN 0.9 % NACL 50 MG/5 ML
SYRINGE (ML) INTRAVENOUS
Status: DISPENSED
Start: 2018-10-17 | End: 2018-10-17

## 2018-10-17 RX ORDER — FENTANYL CITRATE 50 UG/ML
25 INJECTION, SOLUTION INTRAMUSCULAR; INTRAVENOUS EVERY 5 MIN PRN
Status: DISCONTINUED | OUTPATIENT
Start: 2018-10-17 | End: 2018-10-18 | Stop reason: HOSPADM

## 2018-10-17 RX ORDER — NEOSTIGMINE METHYLSULFATE 1 MG/ML
INJECTION, SOLUTION INTRAVENOUS
Status: DISCONTINUED | OUTPATIENT
Start: 2018-10-17 | End: 2018-10-17

## 2018-10-17 RX ORDER — DEXAMETHASONE SODIUM PHOSPHATE 4 MG/ML
INJECTION, SOLUTION INTRA-ARTICULAR; INTRALESIONAL; INTRAMUSCULAR; INTRAVENOUS; SOFT TISSUE
Status: DISCONTINUED | OUTPATIENT
Start: 2018-10-17 | End: 2018-10-17

## 2018-10-17 RX ORDER — PROMETHAZINE HYDROCHLORIDE 25 MG/1
25 TABLET ORAL EVERY 4 HOURS PRN
Qty: 50 TABLET | Refills: 0 | Status: SHIPPED | OUTPATIENT
Start: 2018-10-17 | End: 2019-01-21 | Stop reason: ALTCHOICE

## 2018-10-17 RX ORDER — FENTANYL CITRATE 50 UG/ML
INJECTION, SOLUTION INTRAMUSCULAR; INTRAVENOUS
Status: COMPLETED
Start: 2018-10-17 | End: 2018-10-17

## 2018-10-17 RX ORDER — ACETAMINOPHEN 325 MG/1
650 TABLET ORAL EVERY 4 HOURS PRN
Status: DISCONTINUED | OUTPATIENT
Start: 2018-10-17 | End: 2018-10-18 | Stop reason: HOSPADM

## 2018-10-17 RX ORDER — HYDROCODONE BITARTRATE AND ACETAMINOPHEN 10; 325 MG/1; MG/1
1 TABLET ORAL EVERY 4 HOURS PRN
Qty: 47 TABLET | Refills: 0 | Status: SHIPPED | OUTPATIENT
Start: 2018-10-17 | End: 2019-01-21 | Stop reason: ALTCHOICE

## 2018-10-17 RX ORDER — MIDAZOLAM HYDROCHLORIDE 1 MG/ML
INJECTION INTRAMUSCULAR; INTRAVENOUS
Status: COMPLETED
Start: 2018-10-17 | End: 2018-10-17

## 2018-10-17 RX ORDER — MIDAZOLAM HYDROCHLORIDE 1 MG/ML
INJECTION, SOLUTION INTRAMUSCULAR; INTRAVENOUS
Status: DISCONTINUED | OUTPATIENT
Start: 2018-10-17 | End: 2018-10-17

## 2018-10-17 RX ORDER — BACITRACIN ZINC 500 UNIT/G
OINTMENT (GRAM) TOPICAL
Status: DISCONTINUED | OUTPATIENT
Start: 2018-10-17 | End: 2018-10-17 | Stop reason: HOSPADM

## 2018-10-17 RX ORDER — ALBUTEROL SULFATE 0.83 MG/ML
SOLUTION RESPIRATORY (INHALATION)
Status: COMPLETED
Start: 2018-10-17 | End: 2018-10-17

## 2018-10-17 RX ORDER — GLYCOPYRROLATE 0.2 MG/ML
INJECTION INTRAMUSCULAR; INTRAVENOUS
Status: DISCONTINUED | OUTPATIENT
Start: 2018-10-17 | End: 2018-10-17

## 2018-10-17 RX ORDER — LIDOCAINE HCL/PF 100 MG/5ML
SYRINGE (ML) INTRAVENOUS
Status: DISCONTINUED | OUTPATIENT
Start: 2018-10-17 | End: 2018-10-17

## 2018-10-17 RX ORDER — BUPIVACAINE HYDROCHLORIDE AND EPINEPHRINE 5; 5 MG/ML; UG/ML
INJECTION, SOLUTION EPIDURAL; INTRACAUDAL; PERINEURAL
Status: COMPLETED | OUTPATIENT
Start: 2018-10-17 | End: 2018-10-17

## 2018-10-17 RX ORDER — PROPOFOL 10 MG/ML
VIAL (ML) INTRAVENOUS
Status: DISCONTINUED | OUTPATIENT
Start: 2018-10-17 | End: 2018-10-17

## 2018-10-17 RX ADMIN — PROPOFOL 50 MG: 10 INJECTION, EMULSION INTRAVENOUS at 12:10

## 2018-10-17 RX ADMIN — MIDAZOLAM HYDROCHLORIDE 2 MG: 1 INJECTION INTRAMUSCULAR; INTRAVENOUS at 09:10

## 2018-10-17 RX ADMIN — CEFAZOLIN 2 G: 330 INJECTION, POWDER, FOR SOLUTION INTRAMUSCULAR; INTRAVENOUS at 10:10

## 2018-10-17 RX ADMIN — NEOSTIGMINE METHYLSULFATE 4 MG: 1 INJECTION INTRAVENOUS at 12:10

## 2018-10-17 RX ADMIN — DEXAMETHASONE SODIUM PHOSPHATE 8 MG: 4 INJECTION, SOLUTION INTRAMUSCULAR; INTRAVENOUS at 11:10

## 2018-10-17 RX ADMIN — ROCURONIUM BROMIDE 50 MG: 10 INJECTION, SOLUTION INTRAVENOUS at 10:10

## 2018-10-17 RX ADMIN — PROMETHAZINE HYDROCHLORIDE 6.25 MG: 25 INJECTION INTRAMUSCULAR; INTRAVENOUS at 01:10

## 2018-10-17 RX ADMIN — ROPIVACAINE HYDROCHLORIDE: 2 INJECTION, SOLUTION EPIDURAL; INFILTRATION at 02:10

## 2018-10-17 RX ADMIN — BUPIVACAINE HYDROCHLORIDE AND EPINEPHRINE BITARTRATE 30 ML: 5; .0091 INJECTION, SOLUTION EPIDURAL; INTRACAUDAL; PERINEURAL at 11:10

## 2018-10-17 RX ADMIN — FENTANYL CITRATE 50 MCG: 50 INJECTION INTRAMUSCULAR; INTRAVENOUS at 09:10

## 2018-10-17 RX ADMIN — LIDOCAINE HYDROCHLORIDE 100 MG: 20 INJECTION, SOLUTION INTRAVENOUS at 10:10

## 2018-10-17 RX ADMIN — MIDAZOLAM HYDROCHLORIDE 2 MG: 1 INJECTION, SOLUTION INTRAMUSCULAR; INTRAVENOUS at 10:10

## 2018-10-17 RX ADMIN — PROMETHAZINE HYDROCHLORIDE 6.25 MG: 25 INJECTION INTRAMUSCULAR; INTRAVENOUS at 02:10

## 2018-10-17 RX ADMIN — MIDAZOLAM HYDROCHLORIDE 2 MG: 1 INJECTION, SOLUTION INTRAMUSCULAR; INTRAVENOUS at 09:10

## 2018-10-17 RX ADMIN — SODIUM CHLORIDE, SODIUM GLUCONATE, SODIUM ACETATE, POTASSIUM CHLORIDE, MAGNESIUM CHLORIDE, SODIUM PHOSPHATE, DIBASIC, AND POTASSIUM PHOSPHATE: .53; .5; .37; .037; .03; .012; .00082 INJECTION, SOLUTION INTRAVENOUS at 10:10

## 2018-10-17 RX ADMIN — ACETAMINOPHEN 650 MG: 325 TABLET ORAL at 12:10

## 2018-10-17 RX ADMIN — GLYCOPYRROLATE 0.2 MG: 0.2 INJECTION, SOLUTION INTRAMUSCULAR; INTRAVENOUS at 10:10

## 2018-10-17 RX ADMIN — KETAMINE HYDROCHLORIDE 30 MG: 10 INJECTION, SOLUTION INTRAMUSCULAR; INTRAVENOUS at 10:10

## 2018-10-17 RX ADMIN — ONDANSETRON 4 MG: 2 INJECTION INTRAMUSCULAR; INTRAVENOUS at 11:10

## 2018-10-17 RX ADMIN — PROPOFOL 150 MG: 10 INJECTION, EMULSION INTRAVENOUS at 10:10

## 2018-10-17 RX ADMIN — SODIUM CHLORIDE: 0.9 INJECTION, SOLUTION INTRAVENOUS at 08:10

## 2018-10-17 RX ADMIN — GLYCOPYRROLATE 0.4 MG: 0.2 INJECTION, SOLUTION INTRAMUSCULAR; INTRAVENOUS at 12:10

## 2018-10-17 RX ADMIN — ALBUTEROL SULFATE 2.5 MG: 2.5 SOLUTION RESPIRATORY (INHALATION) at 03:10

## 2018-10-17 NOTE — PLAN OF CARE
Discharge instructions reviewed w/ pt and , verbalized understanding. Pt in NADN. No complaints at this time. States she feels ok to go home. Nausea has subsided. Pt states breathing is better, denies sob or chest pain. Tolerated liquids w/ no emisis. Q ball connected and maintained. To be d/c'd home w/ .

## 2018-10-17 NOTE — PROGRESS NOTES
"Subjective:      Patient ID: Joyce Peterson is a 58 y.o. female.    Chief Complaint: Pain of the Right Shoulder      HPI  AT last visit:   Joyce Peterson is a 58 y.o. female presenting today for MRI follow of L elbow. Per history patient was helping her  carry a heavy fan when she heard and felt a "pop" in the left elbow 3 weeks ago . She had immediate severe pain in the elbow.Pt notes hx of left tennis elbow which began about 2 mos ago. She did have elbow pain from this prior to injury however pain has greatly increased and there is new elbow swelling.       She has been in a hinged elbow brace at 70 (unable to tolerate 90) for several weeks. She does not wear this at night. Reports pain and stiffness worsening with brace.     : Pain has improved but not gone. Now in compression sleeve- elbow strap made it worse. Pt complete 6 weeks of OT.    Today: Pt had new injury to elbow at home. She has had increased pain and swelling in elbow. She is here for a new evaluation of her elbow    Review of patient's allergies indicates:   Allergen Reactions    Actemra [tocilizumab] Other (See Comments)     Severe dizziness    Codeine Nausea And Vomiting    Gold au 198 Hives and Rash    Hydroxychloroquine Other (See Comments)     Can't remember the reaction      Iodinated contrast- oral and iv dye Other (See Comments)     Other reaction(s): BURNING ALL OVER    Iodine Other (See Comments)     Other reaction(s): BURNING ALL OVER - Iodine dye - Not topical    Sulfa (sulfonamide antibiotics) Other (See Comments)     Can't remember the reaction    Zofran [ondansetron hcl (pf)] Nausea And Vomiting     Pt reports that last time she received zofran she started vomiting again    Methotrexate analogues Nausea Only    Pneumovax 23 [pneumococcal 23-argenis ps vaccine] Other (See Comments)     "sick"         No current facility-administered medications for this visit.      Current Outpatient Medications   Medication Sig " Dispense Refill    docusate sodium (COLACE) 100 MG capsule Take 1 capsule (100 mg total) by mouth 3 (three) times daily. 90 capsule 0    HYDROcodone-acetaminophen (NORCO)  mg per tablet Take 1 tablet by mouth every 4 (four) hours as needed for Pain. Patient is in the immediate postoperative period. 47 tablet 0    polyethylene glycol (GLYCOLAX) 17 gram/dose powder Take 17 g by mouth once daily. 1530 g 0    promethazine (PHENERGAN) 25 MG tablet Take 1 tablet (25 mg total) by mouth every 4 (four) hours as needed for Nausea. 50 tablet 0     Facility-Administered Medications Ordered in Other Visits   Medication Dose Route Frequency Provider Last Rate Last Dose    0.9%  NaCl infusion   Intravenous Continuous Callum Singer MD   Stopped at 10/17/18 1058    acetaminophen tablet 650 mg  650 mg Oral Q4H PRN Jesus Muro MD   650 mg at 10/17/18 1258    fentaNYL injection 100 mcg  100 mcg Intravenous Continuous PRN Anjelica Tian MD   50 mcg at 10/17/18 0952    fentaNYL injection 25 mcg  25 mcg Intravenous Q5 Min PRN Sandra Lynne MD        HYDROcodone-acetaminophen  mg per tablet 1 tablet  1 tablet Oral Q4H PRN Jesus Muro MD        HYDROcodone-acetaminophen 5-325 mg per tablet 1 tablet  1 tablet Oral Q4H PRN Jesus Muro MD        ketamine in 0.9 % sod chloride 50 mg/5 mL (10 mg/mL) injection             midazolam (VERSED) 1 mg/mL injection 0.5 mg  0.5 mg Intravenous PRN Anjelica Tian MD   2 mg at 10/17/18 0940    mupirocin 2 % ointment 1 g  1 g Nasal BID Jesus Muro MD        promethazine (PHENERGAN) 6.25 mg in dextrose 5 % 50 mL IVPB  6.25 mg Intravenous Q6H PRN Jesus Muro MD        promethazine suppository 25 mg  25 mg Rectal Q6H PRN Jesus Muro MD        ropivacaine 0.2% ON-Q C-BLOC 400 ML (SELECT A FLOW)   Perineural Continuous Elma Dupont MD 8 mL/hr at 10/17/18 1454      sodium chloride 0.9% flush 3 mL  3 mL Intravenous PRN Sandra Lynne MD  "       sodium chloride 0.9% flush 5 mL  5 mL Intravenous PRN Jesus Dom Muro MD           Past Medical History:   Diagnosis Date    Acid reflux     Allergy     Anemia     Asthma     Coronary artery disease     Degenerative disc disease     Dry eyes     Dry mouth     Gastroparesis     Hyperlipidemia     Lateral meniscus derangement 4/6/2016    Lobular carcinoma in situ     Osteoarthritis     Osteoporosis     Rheumatoid arthritis(714.0)     Umbilical hernia 8/13/2015       Past Surgical History:   Procedure Laterality Date    ARTHROSCOPY-KNEE Right 4/6/2016    [facility] 2ND FLR    BREAST BIOPSY Left 01/29/2002    core bx    CARPAL TUNNEL RELEASE Right 05/2017    CHOLECYSTECTOMY  2004    COLONOSCOPY      10/11    COLONOSCOPY N/A 6/29/2017    Procedure: COLONOSCOPY;  Surgeon: López Moore MD;  Location: [facility] (4TH FLR);  Service: Endoscopy;  Laterality: N/A;    COLONOSCOPY N/A 6/29/2017    Performed by López Moore MD at [facility] ENDO (4TH FLR)    ESOPHAGOGASTRODUODENOSCOPY (EGD) N/A 6/29/2017    Performed by López Moore MD at [facility] ENDO (4TH FLR)    RELEASE-CARPAL TUNNEL / Right Right 5/29/2017    Performed by St[facility]rough [facility] at [facility] [place] OR    REPAIR-HERNIA-UMBILICAL N/A 8/13/2015    Performed by Ricardo Small MD at [facility] OR 2ND FLR    TONSILLECTOMY      TUBAL LIGATION  2003    UPPER GASTROINTESTINAL ENDOSCOPY      10/11    uterine ablation  2003       Review of Systems:  Constitutional: Negative for chills and fever.   Respiratory: Negative for cough and shortness of breath.    Gastrointestinal: Negative for nausea and vomiting.   Skin: Negative for rash.   Neurological: Negative for dizziness and headaches.   Psychiatric/Behavioral: Negative for depression.   MSK as in HPI       OBJECTIVE:     PHYSICAL EXAM:  /78   Pulse 93   Ht 5' 1" (1.549 m)   Wt 70.3 kg (155 lb)   LMP  (LMP Unknown)   BMI 29.29 kg/m²     GEN:  NAD, " well-developed, well-groomed.  NEURO: Awake, alert, and oriented. Normal attention and concentration.    PSYCH: Normal mood and affect. Behavior is normal.  HEENT: No cervical lymphadenopathy noted.  CARDIOVASCULAR: Radial pulses 2+ bilaterally. No LE edema noted.  PULMONARY: Breath sounds normal. No respiratory distress.  SKIN: Intact, no rashes.      MSK:   LUE:  Good active ROM of the wrist and fingers. Good elbow motion  TTP lateral epicondyle and olecranon, + TTP over triceps insertion and there does appear to be a defect present  No varus instability noted on exam  + pain with resisted wrist extension  AIN/PIN/Radial/Median/Ulnar Nerves assessed in isolation without deficit.  Radial & Ulnar arteries palpated 2+.   Capillary Refill <3s.      RADIOGRAPHS:  Xray left elbow 6/4/18  FINDINGS:  No acute fracture.  Degenerative osteophyte spurring is present the radial head.  There is mild soft tissue prominence overlying the olecranon consistent with olecranon bursitis.  No large joint effusion.        ASSESSMENT/PLAN:       ICD-10-CM ICD-9-CM   1. Elbow injury, initial encounter S59.909A 959.3       Orders Placed This Encounter    X-Ray Elbow Complete Left    MRI Elbow Joint Without Contrast Left      Plan:   -New MRI to be ordered to eval triceps tendon

## 2018-10-17 NOTE — ANESTHESIA PREPROCEDURE EVALUATION
10/17/2018  Joyce Peterson is a 58 y.o., female.  Pre-operative evaluation for Procedure(s) (LRB):  REPAIR, TENDON, TRICEPS left elbow (Left)    Joyce Peterson is a 58 y.o. female     Patient Active Problem List   Diagnosis    Osteoarthritis    GERD (gastroesophageal reflux disease)    Gastroparesis    Osteopenia    Coronary artery disease    Lumbar spondylosis    CS (cervical spondylosis)    Thyroid nodule    Hyperlipidemia    Uncomplicated asthma    AR (allergic rhinitis)    Immunosuppressed status    Rheumatoid arthritis involving multiple sites    Carpal tunnel syndrome of right wrist    Elevated parathyroid hormone    Hypogammaglobulinemia    Right carpal tunnel syndrome    Pain in right hand    Pain in right elbow    Decreased  strength of right hand    Right shoulder pain    Iron deficiency anemia due to chronic blood loss    Pure hypercholesterolemia    Research study patient    Sjogren's syndrome    Left elbow pain    Esophageal candidiasis    Rupture of left triceps tendon       Review of patient's allergies indicates:   Allergen Reactions    Actemra [tocilizumab] Other (See Comments)     Severe dizziness    Codeine Nausea And Vomiting    Gold au 198 Hives and Rash    Hydroxychloroquine Other (See Comments)     Can't remember the reaction      Iodinated contrast- oral and iv dye Other (See Comments)     Other reaction(s): BURNING ALL OVER    Iodine Other (See Comments)     Other reaction(s): BURNING ALL OVER - Iodine dye - Not topical    Sulfa (sulfonamide antibiotics) Other (See Comments)     Can't remember the reaction    Zofran [ondansetron hcl (pf)] Nausea And Vomiting     Pt reports that last time she received zofran she started vomiting again    Methotrexate analogues Nausea Only    Pneumovax 23 [pneumococcal 23-argenis ps vaccine] Other (See  "Comments)     "sick"       No current facility-administered medications on file prior to encounter.      Current Outpatient Medications on File Prior to Encounter   Medication Sig Dispense Refill    budesonide-formoterol 160-4.5 mcg (SYMBICORT) 160-4.5 mcg/actuation HFAA Inhale 2 puffs into the lungs every 12 (twelve) hours. 3 Inhaler 3    calcium citrate-vitamin D (CITRACAL + D) 315-200 mg-unit per tablet Take 1 tablet by mouth 2 (two) times daily.       folic acid (FOLVITE) 1 MG tablet TAKE 1 TABLET BY MOUTH EVERY DAY 90 tablet 3    INV PROPULSID 10 MG Take 20 mg by mouth 4 (four) times daily. FOR INVESTIGATIONAL USE ONLY. Protocol [id number] each 2    methotrexate 25 mg/mL injection Inject 0.2 mLs (5 mg total) into the skin every 7 days. 2 mL 1    montelukast (SINGULAIR) 10 mg tablet Take 1 tablet (10 mg total) by mouth nightly. 90 tablet 3    naproxen (NAPROSYN) 500 MG tablet TAKE 1 TABLET TWICE A DAY AS NEEDED 180 tablet 1    omeprazole (PRILOSEC) 40 MG capsule TAKE ONE CAPSULE BY MOUTH EVERY MORNING 30 capsule 6    omeprazole-sodium bicarbonate (ZEGERID) 40-1.1 mg-gram per capsule TAKE 1 CAPSULE BY MOUTH EVERY MORNING. 90 capsule 3    POTASSIUM ORAL Take by mouth once daily.      predniSONE (DELTASONE) 5 MG tablet Take 1 tablet (5 mg total) by mouth once daily. 30 tablet 3    PREMARIN 0.625 mg tablet Take 0.625 mg by mouth once daily.  4    progesterone (PROMETRIUM) 100 MG capsule Take 100 mg by mouth every morning. 1 Capsule Oral Every day, morning      rosuvastatin (CRESTOR) 20 MG tablet TAKE 1 TABLET EVERY DAY (Patient taking differently: TAKE 1 TABLET EVERY evening) 90 tablet 3    sucralfate (CARAFATE) 1 gram tablet TAKE 1 TABLET (1 G TOTAL) BY MOUTH 4 (FOUR) TIMES DAILY. 360 tablet 3    tofacitinib (XELJANZ XR) 11 mg Tb24 Take 11 mg by mouth once daily. 90 tablet 0    tramadol (ULTRAM) 50 mg tablet TAKE 1 TABLET DAILY AS NEEDED PAIN 30 tablet 0    trospium (SANCTURA) 20 mg Tab " "tablet Take 1 tablet (20 mg total) by mouth 2 (two) times daily. 60 tablet 11    albuterol 90 mcg/actuation inhaler Inhale 2 puffs into the lungs every 6 (six) hours as needed for Wheezing. 18 g 11    aspirin 81 mg Tab Take 81 mg by mouth every morning.       azelastine (ASTELIN) 137 mcg nasal spray 1 spray (137 mcg total) by Nasal route 2 (two) times daily. (Patient taking differently: 1 spray by Nasal route 2 (two) times daily as needed. ) 30 mL 11    diazePAM (VALIUM) 5 MG tablet Take 1 tablet (5 mg total) by mouth once. Take 30 minutes prior to MRI for 1 dose 1 tablet 0    fish oil-omega-3 fatty acids 300-1,000 mg capsule Take 1,400 g by mouth once daily.      flaxseed oil 1,000 mg Cap Take by mouth once daily.      fluticasone (FLONASE) 50 mcg/actuation nasal spray       GUAIFENESIN (MUCINEX ORAL) Take 400 mg by mouth every 4 (four) hours as needed.       rizatriptan (MAXALT) 10 MG tablet Take 10 mg by mouth as needed for Migraine.       syringe with needle (TUBERCULIN SYRINGE) 1 mL 27 x 1/2" Syrg To administer methotrexate 7.5mg sc once a week 12 Syringe 3    valACYclovir (VALTREX) 1000 MG tablet TAKE 1 TABLET BY MOUTH TWICE A DAY AS NEEDED 20 tablet 3       Past Surgical History:   Procedure Laterality Date    ARTHROSCOPY-KNEE Right 4/6/2016    [facility] 2ND FLR    BREAST BIOPSY Left 01/29/2002    core bx    CARPAL TUNNEL RELEASE Right 05/2017    CHOLECYSTECTOMY  2004    COLONOSCOPY      10/11    COLONOSCOPY N/A 6/29/2017    Procedure: COLONOSCOPY;  Surgeon: López Moore MD;  Location: [facility] (4TH FLR);  Service: Endoscopy;  Laterality: N/A;    COLONOSCOPY N/A 6/29/2017    Performed by L[facility] Moore MD at [facility] ENDO (4TH FLR)    ESOPHAGOGASTRODUODENOSCOPY (EGD) N/A 6/29/2017    Performed by L[facility] Moore [facility] at [facility] ENDO (4TH FLR)    RELEASE-CARPAL TUNNEL / Right Right 5/29/2017    [facility] OR    " REPAIR-HERNIA-UMBILICAL N/A 2015    Performed by [facility] at [facility] F[facility]    TONSILLECTOMY      TUBAL LIGATION      UPPER GASTROINTESTINAL ENDOSCOPY      10/11    uterine ablation         Social History     Socioeconomic History    Marital status:      Spouse name: Not on file    Number of children: Not on file    Years of education: Not on file    Highest education level: Not on file   Social Needs    Financial resource strain: Not on file    Food insecurity - worry: Not on file    Food insecurity - inability: Not on file    Transportation needs - medical: Not on file    Transportation needs - non-medical: Not on file   Occupational History    Not on file   Tobacco Use    Smoking status: Never Smoker    Smokeless tobacco: Never Used   Substance and Sexual Activity    Alcohol use: Yes     Comment: occasionally    Drug use: No    Sexual activity: Yes     Partners: Male     Birth control/protection: Surgical   Other Topics Concern    Are you pregnant or think you may be? Not Asked    Breast-feeding Not Asked   Social History Narrative    Not on file         CBC:   Recent Labs      10/15/18   0957   WBC  7.57   RBC  4.37   HGB  13.6   HCT  41.8   PLT  312   MCV  96   MCH  31.1*   MCHC  32.5       CMP:   Recent Labs      10/15/18   0957   NA  140   K  3.6   CL  107   CO2  26   BUN  13   CREATININE  0.8   GLU  120*   MG  2.2   PHOS  2.4*   CALCIUM  8.9   ALBUMIN  3.5   PROT  6.4   ALKPHOS  89   ALT  31   AST  29   BILITOT  0.3       INR  No results for input(s): PT, INR, PROTIME, APTT in the last 72 hours.        Diagnostic Studies:      EKD Echo:  No results found. However, due to the size of the patient record, not all encounters were searched. Please check Results Review for a complete set of results.      Anesthesia Evaluation    I have reviewed the Patient Summary Reports.     I have reviewed the Medications.     Review of Systems  Anesthesia  Hx:  No problems with previous Anesthesia  History of prior surgery of interest to airway management or planning: Previous anesthesia: General Denies Family Hx of Anesthesia complications.   Denies Personal Hx of Anesthesia complications.   Cardiovascular:   Exercise tolerance: good CAD      Pulmonary:   Asthma mild    Hepatic/GI:   GERD    Musculoskeletal:   Arthritis     Neurological:   Neuromuscular Disease,        Physical Exam  General:  Well nourished    Airway/Jaw/Neck:  Airway Findings: Mouth Opening: Normal Tongue: Normal  General Airway Assessment: Adult  Mallampati: II  Improves to II with phonation.  TM Distance: Normal, at least 6 cm  Jaw/Neck Findings:  Mandibular Fracture: Negative     Dental:  Dental Findings: In tact   Chest/Lungs:  Chest/Lungs Findings: Clear to auscultation, Normal Respiratory Rate     Heart/Vascular:  Heart Findings: Rate: Normal  Rhythm: Regular Rhythm  Sounds: Normal        Mental Status:  Mental Status Findings:  Cooperative, Alert and Oriented         Anesthesia Plan  Type of Anesthesia, risks & benefits discussed:  Anesthesia Type:  general  Patient's Preference:   Intra-op Monitoring Plan: standard ASA monitors  Intra-op Monitoring Plan Comments:   Post Op Pain Control Plan: multimodal analgesia  Post Op Pain Control Plan Comments:   Induction:   IV  Beta Blocker:         Informed Consent: Patient understands risks and agrees with Anesthesia plan.  Questions answered. Anesthesia consent signed with patient.  ASA Score: 2     Day of Surgery Review of History & Physical:    H&P update referred to the surgeon.         Ready For Surgery From Anesthesia Perspective.

## 2018-10-17 NOTE — DISCHARGE INSTRUCTIONS

## 2018-10-17 NOTE — ANESTHESIA POSTPROCEDURE EVALUATION
"Anesthesia Post Evaluation    Patient: Joyce Peterson    Procedure(s) Performed: Procedure(s) (LRB):  REPAIR, TENDON, TRICEPS left elbow (Left)    Final Anesthesia Type: general (SC catheter)  Patient location during evaluation: PACU ([place])  Patient participation: Yes- Able to Participate  Level of consciousness: awake and alert  Post-procedure vital signs: reviewed and stable  Pain management: adequate  Airway patency: patent  PONV status at discharge: No PONV  Anesthetic complications: no      Cardiovascular status: blood pressure returned to baseline and hemodynamically stable  Respiratory status: spontaneous ventilation, unassisted and room air  Follow-up not needed.        Visit Vitals  BP (!) 146/83   Pulse 107   Temp 36.7 °C (98.1 °F) (Skin)   Resp 16   Ht 5' 1" (1.549 m)   Wt 69.9 kg (154 lb)   LMP  (LMP Unknown)   SpO2 96%   Breastfeeding? No   BMI 29.10 kg/m²       Pain/Isacc Score: Pain Assessment Performed: Yes (10/17/2018 12:47 PM)  Presence of Pain: non-verbal indicators absent (10/17/2018 12:47 PM)  Pain Rating Prior to Med Admin: 0 (10/17/2018  2:54 PM)  Isacc Score: 9 (10/17/2018 12:47 PM)    Pt had episode of postop "chest tightness" that felt c/w prior asthma. Did not radiate to shoulder or neck. No assoc light-headedness or SOB. She also had some wheezing. Both of these symptoms resolved with albuterol neb. VSS throughout.  She feels she had some nausea/indigestion related to receiving zofran which triggered her asthma. She improved after breathing treatment and repositioning. She has h/o CAD by imaging, but has never had symptoms or problems related to her heart. Considering her symptoms were typical for known asthma and improved with treatment, safe to d/c without further workup with precautions. All questions answered. oLve Addison MD      "

## 2018-10-17 NOTE — PROGRESS NOTES
Pt complaining of chest tightness and chest heaviness, pt states he feels like her asthma breathing tx ordered prn. Pt in NADN. VSS. Spoke w/ Dr. Addison and informed her, she came to bedside and assessed pt. Will continue to monitor.

## 2018-10-17 NOTE — ANESTHESIA PROCEDURE NOTES
Supraclavicular Catheter    Patient location during procedure: pre-op   Block not for primary anesthetic.  Reason for block: at surgeon's request and post-op pain management   Post-op Pain Location: left elbow pain  Start time: 10/17/2018 9:45 AM  Timeout: 10/17/2018 9:45 AM   End time: 10/17/2018 10:00 AM  Staffing  Anesthesiologist: Anjelica Tian MD  Other anesthesia staff: Ricardo Vuong Jr., MD  Performed: other anesthesia staff   Preanesthetic Checklist  Completed: patient identified, site marked, surgical consent, pre-op evaluation, timeout performed, IV checked, risks and benefits discussed and monitors and equipment checked  Peripheral Block  Patient position: sitting  Prep: ChloraPrep and site prepped and draped  Patient monitoring: heart rate, cardiac monitor, continuous pulse ox, continuous capnometry and frequent blood pressure checks  Block type: supraclavicular  Laterality: left  Injection technique: continuous  Needle  Needle type: Tuohy   Needle gauge: 17 G  Needle length: 3.5 in  Needle localization: anatomical landmarks and ultrasound guidance  Catheter type: spring wound  Catheter size: 19 G  Test dose: lidocaine 1.5% with Epi 1-to-200,000 and negative   -ultrasound image captured on disc.  Assessment  Injection assessment: negative aspiration, negative parasthesia and local visualized surrounding nerve  Paresthesia pain: none  Heart rate change: no  Slow fractionated injection: yes  Additional Notes  VSS.  [facility] RN monitoring vitals throughout procedure.  Patient tolerated procedure well.

## 2018-10-17 NOTE — BRIEF OP NOTE
[facility]  Brief Operative Note     SUMMARY     Surgery Date: 10/17/2018     Surgeon(s) and Role:     * Staci Yarbrough MD - Primary     * Jesus Muro MD - Resident - Assisting        Pre-op Diagnosis:  Left elbow pain [M25.522]  Rupture of left triceps tendon, initial encounter [S46.312A]    Post-op Diagnosis:  Post-Op Diagnosis Codes:     * Left elbow pain [M25.522]     * Rupture of left triceps tendon, initial encounter [S46.312A]    Procedure(s) (LRB):  REPAIR, TENDON, TRICEPS left elbow (Left)    Anesthesia: General    Description of the findings of the procedure: see op note         Specimens:   Specimen (12h ago, onward)    None          Discharge Note    SUMMARY     Admit Date: 10/17/2018    Discharge Date and Time:  10/17/2018 12:43 PM    Hospital Course (synopsis of major diagnoses, care, treatment, and services provided during the course of the hospital stay): Patient presented for above procedure.  Tolerated it well and was discharged home POD0 after voiding, tolerating diet, ambulating, pain controlled.  Discharge instructions, follow-up appointment, and med rec are below.       Final Diagnosis: Post-Op Diagnosis Codes:     * Left elbow pain [M25.522]     * Rupture of left triceps tendon, initial encounter [S46.312A]    Disposition: Home or Self Care    Follow Up/Patient Instructions:     Medications:  Reconciled Home Medications:      Medication List      START taking these medications    docusate sodium 100 MG capsule  Commonly known as:  COLACE  Take 1 capsule (100 mg total) by mouth 3 (three) times daily.     HYDROcodone-acetaminophen  mg per tablet  Commonly known as:  NORCO  Take 1 tablet by mouth every 4 (four) hours as needed for Pain. Patient is in the immediate postoperative period.     polyethylene glycol 17 gram/dose powder  Commonly known as:  GLYCOLAX  Take 17 g by mouth once daily.     promethazine 25 MG tablet  Commonly known as:  PHENERGAN  Take 1  tablet (25 mg total) by mouth every 4 (four) hours as needed for Nausea.        CHANGE how you take these medications    azelastine 137 mcg (0.1 %) nasal spray  Commonly known as:  ASTELIN  1 spray (137 mcg total) by Nasal route 2 (two) times daily.  What changed:    · when to take this  · reasons to take this     rosuvastatin 20 MG tablet  Commonly known as:  CRESTOR  TAKE 1 TABLET EVERY DAY  What changed:  See the new instructions.        CONTINUE taking these medications    albuterol 90 mcg/actuation inhaler  Commonly known as:  PROVENTIL/VENTOLIN HFA  Inhale 2 puffs into the lungs every 6 (six) hours as needed for Wheezing.     aspirin 81 mg Tab  Take 81 mg by mouth every morning.     budesonide-formoterol 160-4.5 mcg 160-4.5 mcg/actuation Hfaa  Commonly known as:  SYMBICORT  Inhale 2 puffs into the lungs every 12 (twelve) hours.     CITRACAL PLUS D 315-200 mg-unit per tablet  Generic drug:  calcium citrate-vitamin D3 315-200 mg  Take 1 tablet by mouth 2 (two) times daily.     fish oil-omega-3 fatty acids 300-1,000 mg capsule  Take 1,400 g by mouth once daily.     flaxseed oil 1,000 mg Cap  Take by mouth once daily.     fluconazole 100 MG tablet  Commonly known as:  DIFLUCAN  TAKE 1 TABLET (100 MG TOTAL) BY MOUTH ONCE DAILY.     fluticasone 50 mcg/actuation nasal spray  Commonly known as:  FLONASE     folic acid 1 MG tablet  Commonly known as:  FOLVITE  TAKE 1 TABLET BY MOUTH EVERY DAY     INV PROPULSID 10 MG  Take 20 mg by mouth 4 (four) times daily. FOR INVESTIGATIONAL USE ONLY. Protocol [id number]     leflunomide 20 MG Tab  Commonly known as:  ARAVA  TAKE 1 TABLET (20 MG TOTAL) BY MOUTH ONCE DAILY.     MAXALT 10 MG tablet  Generic drug:  rizatriptan  Take 10 mg by mouth as needed for Migraine.     methotrexate 25 mg/mL injection  Inject 0.2 mLs (5 mg total) into the skin every 7 days.     montelukast 10 mg tablet  Commonly known as:  SINGULAIR  Take 1 tablet (10 mg total) by mouth nightly.     MUCINEX  "ORAL  Take 400 mg by mouth every 4 (four) hours as needed.     omeprazole 40 MG capsule  Commonly known as:  PRILOSEC  TAKE ONE CAPSULE BY MOUTH EVERY MORNING     omeprazole-sodium bicarbonate 40-1.1 mg-gram per capsule  Commonly known as:  ZEGERID  TAKE 1 CAPSULE BY MOUTH EVERY MORNING.     POTASSIUM ORAL  Take by mouth once daily.     predniSONE 5 MG tablet  Commonly known as:  DELTASONE  Take 1 tablet (5 mg total) by mouth once daily.     PREMARIN 0.625 MG tablet  Generic drug:  estrogens (conjugated)  Take 0.625 mg by mouth once daily.     PROMETRIUM 100 MG capsule  Generic drug:  progesterone  Take 100 mg by mouth every morning. 1 Capsule Oral Every day, morning     RESTASIS 0.05 % ophthalmic emulsion  Generic drug:  cycloSPORINE  Place 0.4 mLs (1 drop total) into both eyes 2 (two) times daily.     sucralfate 1 gram tablet  Commonly known as:  CARAFATE  TAKE 1 TABLET (1 G TOTAL) BY MOUTH 4 (FOUR) TIMES DAILY.     syringe with needle 1 mL 27 x 1/2" Syrg  Commonly known as:  TUBERCULIN SYRINGE  To administer methotrexate 7.5mg sc once a week     tofacitinib 11 mg Tb24  Commonly known as:  XELJANZ XR  Take 11 mg by mouth once daily.     traMADol 50 mg tablet  Commonly known as:  ULTRAM  TAKE 1 TABLET DAILY AS NEEDED PAIN     trospium 20 mg Tab tablet  Commonly known as:  sanctura  Take 1 tablet (20 mg total) by mouth 2 (two) times daily.     valACYclovir 1000 MG tablet  Commonly known as:  VALTREX  TAKE 1 TABLET BY MOUTH TWICE A DAY AS NEEDED        STOP taking these medications    diazePAM 5 MG tablet  Commonly known as:  VALIUM        ASK your doctor about these medications    naproxen 500 MG tablet  Commonly known as:  NAPROSYN  TAKE 1 TABLET TWICE A DAY AS NEEDED          Discharge Procedure Orders   Diet general     Call MD for:  temperature >100.4     Call MD for:  persistent nausea and vomiting     Call MD for:  severe uncontrolled pain     Call MD for:  difficulty breathing, headache or visual disturbances "     Call MD for:  redness, tenderness, or signs of infection (pain, swelling, redness, odor or green/yellow discharge around incision site)     Call MD for:  hives     Call MD for:  persistent dizziness or light-headedness     Call MD for:  extreme fatigue     Sponge bath only until clinic visit     Keep surgical extremity elevated     Lifting restrictions   Order Comments: NWB LUE, no lifting     No driving, operating heavy equipment or signing legal documents while taking pain medication.     Leave dressing on - Keep it clean, dry, and intact until clinic visit     Follow-up Information     [facility] In 2 weeks.    Specialties:  Hand Surgery, Orthopedic Surgery  Contact information:  [address] [id number] [phone]

## 2018-10-17 NOTE — TRANSFER OF CARE
"Anesthesia Transfer of Care Note    Patient: Joyce Peterson    Procedure(s) Performed: Procedure(s) (LRB):  REPAIR, TENDON, TRICEPS left elbow (Left)    Patient location: PACU    Anesthesia Type: general and MAC    Transport from OR: Transported from OR on 6-10 L/min O2 by face mask with adequate spontaneous ventilation    Post pain: adequate analgesia    Post assessment: tolerated procedure well and no apparent anesthetic complications    Post vital signs: stable    Level of consciousness: responds to stimulation and sedated    Nausea/Vomiting: no nausea/vomiting    Complications: none    Transfer of care protocol was followed      Last vitals:   Visit Vitals  BP (!) 140/74 (BP Location: Right arm, Patient Position: Lying)   Pulse (!) 116   Temp 36.8 °C (98.3 °F) (Oral)   Resp 18   Ht 5' 1" (1.549 m)   Wt 69.9 kg (154 lb)   LMP  (LMP Unknown)   SpO2 97%   Breastfeeding? No   BMI 29.10 kg/m²     "

## 2018-10-18 ENCOUNTER — PATIENT MESSAGE (OUTPATIENT)
Dept: INTERNAL MEDICINE | Facility: CLINIC | Age: 58
End: 2018-10-18

## 2018-10-18 NOTE — OP NOTE
DATE OF PROCEDURE:  10/17/2018     PREOPERATIVE DIAGNOSIS:  Left triceps tendon rupture.     POSTOPERATIVE DIAGNOSIS:  Left triceps tendon rupture.     PROCEDURE:  Primary repair of right triceps tendon.     SURGEON:  Staci Yarbrough M.D.     ASSISTANT: Jesus Muro     POSITION:  Prone.     INDICATIONS FOR PROCEDURE:  The patient is a 58-year-old female who was found to have traumatic left triceps tendon avulsion from olecranon.  After discussion of risks and benefits of surgery were discussed with the patient, he elects to proceed with a primary triceps tendon repair.  All risks and benefits were discussed.  No guarantees made.     PROCEDURE IN DETAIL:  The patient was brought in the Operating Room.  After   undergoing general endotracheal anesthesia, she was placed prone on a well-padded   operating table.  His left upper extremity was prepped and draped in usual   sterile fashion.  A sterile tourniquet was placed high up around his arm.    After Esmarch exsanguinated the limb, the tourniquet was elevated to 250 mmHg.     The posterior 8 cm incision was made centered lateral to the tip of the olecranon extending over the subcutaneous border of ulna.  After going through skin and subcutaneous tissues, full thickness skin flaps were developed.  Blunt dissection was taken down.  The triceps rupture was   directly visualized.  Dense scar tissue was present proximally along the retracted triceps.  This was sharply excised until healthy tendon encountered.  Once this was   done, we performed a locking Krackow stitch configuration with a #2  FiberWire suture, beginning on the distal undersurface of  the lateral side of the tendon working up proximally and then back down the lateral edge exiting distally.  The same configuration was used on the medial side.  Looped FiberWire sutures were then passed through the triceps with a free needle at the footprint between the distally exiting strands medially and  laterally.     Attention was turned to the proximal olecranon.  Full thickness flap was   developed over for repair in a subperiosteal manner.  Medial and lateral transosseous drill holes were made, ensuring large bone bridge without joint penetration.  Medial and lateral tendon   sutures were passed in there respective drill holes on the olecranon.  One medial and one lateral suture was repassed through the lateral drill hole with the lateral looped FiberWire.  This was repeated on the medial side with the remaining suture tails.  4.5mm drill was used to create anchor  hole.  Sutures were fed through anchor.  Tendon reduced to insertion. The 4.5 mm anchor was then placed with excellent purchase in the proximal   olecranon.  The elbow was taken through a full range of motion with no   evidence of gap formation.  A repair was judged to be excellent.     The wounds were copiously irrigated.   The fascial layer was closed with 0 Vicryl.  Skin was closed with 3-0 Vicryl and 3-0 nylon sutures.  The wounds were covered with bacitracin, adaptic, 4x4 gauze, and cast padding.  The patient was placed in   a splint slightly about 100 degrees and brought back to a supine position.  The   patient was extubated in the room, transferred to recovery room in stable   condition accompanied by his physician.  No complications in the case.     PLAN:  Patient will be nonweightbearing in long arm splint for 2 weeks.  She will return to clinic at that time for suture removal.

## 2018-10-18 NOTE — ANESTHESIA POST-OP PAIN MANAGEMENT
Contacted patient regarding pain control and OnQ.  Patient states she is having pain that is only relieved for about 2 hours with PO meds. Advised patient she can take an extra half to see if that will help. She also reports feeling lump in her throat and having some difficulty taking deep breaths which she attributes to her asthma however denies any wheezing. Assured patient that these symptoms are likely a side effect of the nerve block. She denies gasping for air or aspirating when swallowing. Patient reassured. Told her if symptoms are too bothersome then to clamp off PNC or remove as was previously instructed.     Will follow-up with patient tomorrow after PNC removed.    Signing Physician:  Elma Dupont MD   Anesthesiology PGY-3/CA-2  10/18/2018  1:13 PM

## 2018-10-19 NOTE — ANESTHESIA POST-OP PAIN MANAGEMENT
Contacted patient to follow-up on PNC removal.  Still with ongoing SOB. Advised patient to remove PNC now. Blue tip in tact, no new issues. Reassured that breathing symptoms will improve as local anesthetic wears off.     Signing Physician:  Elma Dupont MD   Anesthesiology PGY-3/CA-2    10/19/2018  1:41 PM

## 2018-10-19 NOTE — ADDENDUM NOTE
Addendum  created 10/19/18 1349 by Elma Dupont MD    Intraprocedure Event edited, Sign clinical note

## 2018-10-22 ENCOUNTER — TELEPHONE (OUTPATIENT)
Dept: ORTHOPEDICS | Facility: CLINIC | Age: 58
End: 2018-10-22

## 2018-10-22 ENCOUNTER — PATIENT MESSAGE (OUTPATIENT)
Dept: ORTHOPEDICS | Facility: CLINIC | Age: 58
End: 2018-10-22

## 2018-10-22 NOTE — TELEPHONE ENCOUNTER
----- Message from Ellie Grover sent at 10/22/2018  4:18 PM CDT -----  Contact: Pt   Name of Who is Calling: TONNY GOMEZ [[id number]]    What is the request in detail: Pt is requesting to speak with the nurse in regards to her hand. She states that she has a little swelling and not much pain. She states she don't need the medication she was prescribed and want to speak to the nurse directly. Please advise.    Can the clinic reply by [facility]: No     [facility] [phone]

## 2018-10-23 ENCOUNTER — TELEPHONE (OUTPATIENT)
Dept: ORTHOPEDICS | Facility: CLINIC | Age: 58
End: 2018-10-23

## 2018-10-23 NOTE — TELEPHONE ENCOUNTER
Returned pt call. She had questions regarding pain medication. She has intermittent pain. She did not tolerate the Norco. She is currently taking Advil during the day with decent pain relief however feels it is wearing off prior to next dose. Recommend trying Tylenol for breakthrough pain. Last night she took half of an Ultram along with phenergan with good results. She should call back if she has further questions.

## 2018-10-23 NOTE — TELEPHONE ENCOUNTER
Spoke with pt , informed pt Meagan states she can take Tylenol as needed for pain and alternate the tramadol in between as needed for pain . She can also ice. Pt verbalized understanding and Meagan will give her a call before the end of the day.

## 2018-10-23 NOTE — TELEPHONE ENCOUNTER
----- Message from Chelsea Domingo sent at 10/22/2018  5:19 PM CDT -----  Contact: [phone]  Pt missed a call and would like the nurse to return their call.        Thank you!

## 2018-10-31 ENCOUNTER — OFFICE VISIT (OUTPATIENT)
Dept: ORTHOPEDICS | Facility: CLINIC | Age: 58
End: 2018-10-31
Payer: MEDICARE

## 2018-10-31 VITALS
DIASTOLIC BLOOD PRESSURE: 79 MMHG | HEART RATE: 109 BPM | HEIGHT: 61 IN | SYSTOLIC BLOOD PRESSURE: 108 MMHG | BODY MASS INDEX: 29.07 KG/M2 | WEIGHT: 154 LBS

## 2018-10-31 DIAGNOSIS — S46.312A RUPTURE OF LEFT TRICEPS TENDON, INITIAL ENCOUNTER: Primary | ICD-10-CM

## 2018-10-31 PROCEDURE — 99024 POSTOP FOLLOW-UP VISIT: CPT | Mod: ,,, | Performed by: PHYSICIAN ASSISTANT

## 2018-10-31 PROCEDURE — 99215 OFFICE O/P EST HI 40 MIN: CPT | Mod: PBBFAC | Performed by: PHYSICIAN ASSISTANT

## 2018-10-31 PROCEDURE — 99999 PR PBB SHADOW E&M-EST. PATIENT-LVL V: CPT | Mod: PBBFAC,,, | Performed by: PHYSICIAN ASSISTANT

## 2018-10-31 RX ORDER — TRAMADOL HYDROCHLORIDE 50 MG/1
50 TABLET ORAL EVERY 6 HOURS
Qty: 28 TABLET | Refills: 0 | Status: SHIPPED | OUTPATIENT
Start: 2018-10-31 | End: 2019-04-11 | Stop reason: SDUPTHER

## 2018-10-31 NOTE — PROGRESS NOTES
"Ms. Peterson is here today for a post-operative visit.  She is 14 days status post Primary repair of left triceps tendon by Dr. Yarbrough on 10/17/18. She reports that she is doing well.  Pain is mild-moderate, 4/10.  She is not taking pain medication due to constipation, she switched to ibuprofen and tylenol.  She denies fever, chills, and sweats since the time of the surgery.     Physical exam:    Vitals:    10/31/18 1021   BP: 108/79   Pulse: 109   Weight: 69.9 kg (154 lb)   Height: 5' 1" (1.549 m)   PainSc:   4     Vital signs are stable, patient is afebrile.  Patient is well dressed and well groomed, no acute distress.  Alert and oriented to person, place, and time.  Post op dressing taken down.  Incision is clean, dry and intact.  There is no erythema or exudate.  There is no sign of any infection. She is NVI. Sutures removed without difficulty.  Good finger and wrist ROM.    Assessment: 14 days status post Primary repair of left triceps tendon    Plan:  Joyce was seen today for post-op evaluation.    Diagnoses and all orders for this visit:    Rupture of left triceps tendon, initial encounter    Other orders  -     traMADol (ULTRAM) 50 mg tablet; Take 1 tablet (50 mg total) by mouth every 6 (six) hours.        - PO instruction reviewed and provided to patient  - Elbow brace fitted, patient brought in her own brace from prior visit. Brace adjusted allowing for motion between 70-90 degrees of flexion (20 degree range).  - Orders placed for OT, follow [facility] protocol for triceps repair; adjust brace accordingly as therapy progresses  - Call with questions or concerns  - Tramadol Rx provided  - Follow up in 4 weeks    "

## 2018-11-01 ENCOUNTER — CLINICAL SUPPORT (OUTPATIENT)
Dept: REHABILITATION | Facility: HOSPITAL | Age: 58
End: 2018-11-01
Attending: PHYSICIAN ASSISTANT
Payer: MEDICARE

## 2018-11-01 DIAGNOSIS — M25.522 PAIN IN LEFT ELBOW: ICD-10-CM

## 2018-11-01 PROCEDURE — G8987 SELF CARE CURRENT STATUS: HCPCS | Mod: CL,PN

## 2018-11-01 PROCEDURE — G8988 SELF CARE GOAL STATUS: HCPCS | Mod: CK,PN

## 2018-11-01 PROCEDURE — 97110 THERAPEUTIC EXERCISES: CPT | Mod: PN

## 2018-11-01 PROCEDURE — 97165 OT EVAL LOW COMPLEX 30 MIN: CPT | Mod: PN

## 2018-11-01 NOTE — PLAN OF CARE
[facility] Occupational Therapy  Initial Evaluation     Name: Joyce Peterson  Clinic Number: [id number]    Medical Diagnosis: Left triceps rupture s/p repair   Date of Onset: 6 weeks ago  Date of Surgery: 10/17/2018  Surgical Procedure: Left triceps repair with Arthrex fiberwire  Therapy Diagnosis:   Encounter Diagnosis   Name Primary?    Pain in left elbow      Precautions: Standard- continuous brace wear, no hernández/pro    Physician: Kymberly Noriega PA via Dr. Humphrey-Moise  Physician Orders: eval and treat- Elbow ROM  degrees, increasing by 10 degrees each week, no FA rotation.   Date of Return to MD: 11/21/2018    Evaluation Date: 11/1/2018  Plan of Care Certification Period: 11/01/2018-12/30/2018    Visit #: / Visits authorized: 1/50  Insurance Authorization period Expiration: 12/31/2018    Time In:1:00PM  Time Out: 2:00PM  Total Billable Time: 60 minutes  Charges for this Visit: low eval, TE x 1      Subjective     Involved Side:  left  Dominant Side: Right  Imaging: MRI studies : full triceps tear  Mechanism of Injury: Fell while playing tag with 5 year old grandson, hit elbow on window ledge. Attempted to see MD but could not get in. Left on vacation and when she returned saw Dr. Humphrey. MRI confirmed tear and sx was recommended.Post op LAC x 10 days.  Post Op follow up was put into hinged elbow brace at 70-90 degrees. Referred to therapy  History of Current Condition: as above  Previous Therapy: Saw OT for 9 visits prior to injury for Left Lateral Epicondylosis, partial ECRB tear, Grade II RCL sprain, partial triceps tear vs tendinosis, olecranon bursitis; Right subacromial bursitis. She stopped after leaving for vacation and then experienced the complete tear of the triceps.         Pain:  Functional Pain Scale Rating 0-10:   2/10 at current  2/10 at best  7/10 at worst  Location: inside the elbow  Description: Aching, Burning and Sharp  Aggravating Factors: nothing particular  Easing  Factors: rest      Past Medical History/Physical Systems Review:   Joyce Peterson  has a past medical history of Acid reflux, Allergy, Anemia, Asthma, Coronary artery disease, Degenerative disc disease, Dry eyes, Dry mouth, Gastroparesis, Hyperlipidemia, Lateral meniscus derangement, Lobular carcinoma in situ, Osteoarthritis, Osteoporosis, Rheumatoid arthritis(714.0), and Umbilical hernia.    Joyce Peterson  has a past surgical history that includes Cholecystectomy (2004); Tonsillectomy; Tubal ligation (2003); uterine ablation (2003); Upper gastrointestinal endoscopy; Colonoscopy; Carpal tunnel release (Right, 05/2017); Breast biopsy (Left, 01/29/2002); REPAIR, TENDON, TRICEPS left elbow (Left, 10/17/2018); ESOPHAGOGASTRODUODENOSCOPY (EGD) (N/A, 6/29/2017); COLONOSCOPY (N/A, 6/29/2017); RELEASE-CARPAL TUNNEL / Right (Right, 5/29/2017); ARTHROSCOPY-KNEE (Right, 4/6/2016); and REPAIR-HERNIA-UMBILICAL (N/A, 8/13/2015).    Joyce has a current medication list which includes the following prescription(s): albuterol, aspirin, azelastine, budesonide-formoterol 160-4.5 mcg, calcium citrate-vitamin d3 315-200 mg, docusate sodium, fish oil-omega-3 fatty acids, flaxseed oil, fluticasone, folic acid, guaifenesin, hydrocodone-acetaminophen, INV PROPULSID 10 MG, leflunomide, methotrexate, montelukast, naproxen, omeprazole, omeprazole-sodium bicarbonate, polyethylene glycol, potassium, premarin, progesterone, promethazine, restasis, rizatriptan, rosuvastatin, sucralfate, syringe with needle, tofacitinib, tramadol, trospium, and valacyclovir, and the following Facility-Administered Medications: sodium chloride 0.9%.    Review of patient's allergies indicates:   Allergen Reactions    Actemra [tocilizumab] Other (See Comments)     Severe dizziness    Codeine Nausea And Vomiting    Gold au 198 Hives and Rash    Hydroxychloroquine Other (See Comments)     Can't remember the reaction      Iodinated contrast- oral and iv dye  "Other (See Comments)     Other reaction(s): BURNING ALL OVER    Iodine Other (See Comments)     Other reaction(s): BURNING ALL OVER - Iodine dye - Not topical    Sulfa (sulfonamide antibiotics) Other (See Comments)     Can't remember the reaction    Zofran [ondansetron hcl (pf)] Nausea And Vomiting     Pt reports that last time she received zofran she started vomiting again    Methotrexate analogues Nausea Only    Pneumovax 23 [pneumococcal 23-argenis ps vaccine] Other (See Comments)     "sick"          Patient's Goals for Therapy: See Assessment chart below.    Occupation:  See Assessment chart below.     Functional Limitations/Social History: See Assessment chart below.       Objective     Observation/Appearance: Pt. Presents with hinged elbow brace at 70-90 degrees unlocked.     Elbow and Wrist ROM. Measured in degrees.   11/1/2018 11/1/2018    Right Left*    AROM AROM   Elbow Ext/Flex 0/153 30-90 in brace   Supination/Pronation 55/90 45/80   Wrist Ext/Flex WNL 55/30   Wrist RD/UD WNL 15/15          Strength (Dynamometer) and Pinch Strength (Pinch Gauge)  Measured in pounds.   11/1/2018 11/1/2018    Left Right   Rung II Deferred due to protocol Deferred due to protocol   Key Pinch     3pt Pinch     2pt Pinch       Sensation: Intact per report    CMS Impairment/Limitation/Restriction for FOTO Survey  Therapist reviewed FOTO scores for Joyce Peterson.  FOTO documents entered into [employer] - see Media section.    Intake Limitation Score: 72% - 11/1/2018  Category: Self-Care     Current : CL= at least 60% but < 80% impaired, limited or restricted  Goal: CK = at least 40% but < 60% impaired, limited or restricted  Discharge: TBD           Treatment     Treatment Time In: 140PM  Treatment Time Out: 200PM  Total Treatment time separate from Evaluation time:20min    Joyce received the following supervised modalities after being cleared for contradictions for 10 minutes:   -Patient received MH to left elbow to " increase blood flow, circulation and tissue elasticity prior to therex      Joyce received the following direct contact modalities after being cleared for contraindications for 0 minutes:  -none today    Joyce received the following manual therapy techniques for 0 minutes:   -none today    Joyce received therapeutic exercises for 8 minutes including:  -  AROM gentle in brace: Flexion and gravity assisted extension 90-30 degrees x 10 rep   AROM wrist flex/ext, RD/UD gentle. In brace X 10 reps                                             Home Exercise Program/Education:  Issued HEP (see patient instructions in EMR) and educated on modality use for pain management . Exercises were reviewed and Joyce was able to demonstrate them prior to the end of the session.   Pt received a written copy of exercises to perform at home. Joyce demonstrated good  understanding of the education provided.  Pt was advised to perform these exercises free of pain, and to stop performing them if pain occurs.    Patient/Family Education: role of OT, goals for OT, scheduling/cancellations - pt verbalized understanding. Discussed insurance limitations with patient.    Additional Education provided: very gentle motion, will start scar massage next week, progression of range of motion/therapy (10 more degrees elbow flx/ext each week) and importance of not overstressing repair. Use of lock function in flexion when not exercising and to sleep.       Assessment     Joyce Peterson is a 58 y.o. female referred to outpatient occupational/hand therapy and presents with a medical diagnosis of left complete tricep repair s/p 2 weeks.She demonstrates limitations as described in the chart below. Following evaluation and medical record review it is determined that pt will benefit from occupational therapy services in order to maximize pain free and/or functional use of left UE. The following goals were discussed with the patient and patient is in  agreement with them as to be addressed in the treatment plan. The patient's rehab potential is Excellent.     Anticipated barriers to occupational therapy: hx of RA  Pt has no cultural, educational or language barriers to learning provided.     Profile and History Assessment of Occupational Performance Level of Clinical Decision Making Complexity Score   Occupational Profile:   Joyce Peterson is a 57 y.o. female who lives with their spouse      Joyce Peterson is currently not working.     Joyce Peterson has difficulty with  feeding, bathing, grooming and dressing  phone/computer use and housework/household chores  affecting his/her daily functional abilities. His/her main goal for therapy is decreased pain.      Previous functional status: Independent with all ADLs.      Comorbidities:   See PMH and Physical Systems Review Section above.     Medical and Therapy History Review:   Brief                      Performance Deficits     Physical:  Muscle Power/Strength  Muscle Endurance  Edema  Pain     Cognitive:  No Deficits     Psychosocial:    No Deficits       Clinical Decision Making:  low     Assessment Process:  Problem-Focused Assessments     Modification/Need for Assistance:  Not Necessary     Intervention Selection:  Limited Treatment Options         low  Based on PMHX, co morbidities , data from assessments and functional level of assistance required with task and clinical presentation directly impacting function.        Goals      Long Term Goals (LTGs); to be met by discharge.  LTG #1: Pt will report a pain level of 0 out of 10 at rest.  LTG #2: Pt will demo improved FOTO score by 19 points.   LTG #3: Pt will return to prior level of function for ADLs and household management.      Short Term Goals (STGs); to be met within 6 weeks (12/15/2018).  STG #1a: Pt will report 2 out of 10 pain level at rest.  STG #2b: Pt will demonstrate independence with issued HEP.   STG #3b: Pt will demo improved elbow  ROM to WFL (  degrees Actively).        Plan     Outpatient occupational therapy 2 times weekly for 8 weeks during the certification period from 11/1/2018 to 12/31/2018.    Treatment to include: Paraffin, Fluidotherapy, Modalities for pain management, US 3 mhz, Therapeutic exercises/activities., Orthotic Fabrication/Fit/Training, Edema Control, Scar Management, Joint Protection and strengthening when appropriate per protocol, as well as any other treatments deemed necessary based on the patient's needs or progress.     Therapist: KALEY Shannon, OTR/L, CHT   Occupational therapist, Certified Hand Therapist       I certify the need for these services furnished under this plan of treatment and while under my care    ____________________________________                         __________________  Physician/Referring Practitioner                                               Date of Signature

## 2018-11-01 NOTE — PATIENT INSTRUCTIONS
[facility], OCCUPATIONAL THERAPY  HOME EXERCISE PROGRAM     Complete the following exercises with 10 repetitions each, 4x/day.     AROM: Elbow Flexion / Extension          While wearing brace, bend elbow to stop and allow gravity to extend arm           * brace off, resting arm on table    Copyright © I. All rights reserved.     Therapist: KALEY Shannon, OTR/L, CHT   Occupational therapist, Certified Hand Therapist

## 2018-11-06 ENCOUNTER — CLINICAL SUPPORT (OUTPATIENT)
Dept: REHABILITATION | Facility: HOSPITAL | Age: 58
End: 2018-11-06
Payer: MEDICARE

## 2018-11-06 DIAGNOSIS — M25.522 PAIN IN LEFT ELBOW: ICD-10-CM

## 2018-11-06 PROCEDURE — 97140 MANUAL THERAPY 1/> REGIONS: CPT | Mod: PN

## 2018-11-06 PROCEDURE — 97110 THERAPEUTIC EXERCISES: CPT | Mod: PN

## 2018-11-06 NOTE — PROGRESS NOTES
Occupational Therapy Daily Treatment Note      Date: 11/6/2018  Name: Joyce Peterson  Clinic Number: [id number]  Name: Joyce Peterson  Clinic Number: [id number]     Medical Diagnosis: Left triceps rupture s/p repair   Date of Onset: 6 weeks ago  Date of Surgery: 10/17/2018  Surgical Procedure: Left triceps repair with Arthrex fiberwire  Therapy Diagnosis:        Encounter Diagnosis   Name Primary?    Pain in left elbow        Precautions: Standard- continuous brace wear, no hernández/pro     Physician: Kymberly Noriega PA via Dr. Yarbrough  Physician Orders: eval and treat- Elbow ROM  degrees, increasing by 10 degrees each week, no FA rotation.   Date of Return to MD: 11/21/2018     Evaluation Date: 11/1/2018  Plan of Care Certification Period: 11/01/2018-12/30/2018     Visit #: / Visits authorized: 2/50  Insurance Authorization period Expiration: 12/31/2018    Time In:1005AM  Time Out: 1045AM  Total Billable Time: 25 minutes  Charges for this Visit: MTILYA     Subjective     Pt reports: She adjusted to having more range with the brace  she was compliant with home exercise program given last session.   Response to previous treatment:slight achiness reported  Functional change: none    Pain: 1/10  Location: left elbow- achy feeling    Objective   Observation/Appearance: Pt. Presents with hinged elbow brace at 30-90 degrees unlocked. - Unlocked per protocol today to 25-90     Elbow and Wrist ROM. Measured in degrees.    11/1/2018 11/1/2018     Right Left*     AROM AROM   Elbow Ext/Flex 0/153 30-90 in brace   Supination/Pronation 55/90 45/80   Wrist Ext/Flex WNL 55/30   Wrist RD/UD WNL 15/15             Strength (Dynamometer) and Pinch Strength (Pinch Gauge)  Measured in pounds.    11/1/2018 11/1/2018     Left Right   Rung II Deferred due to protocol Deferred due to protocol   Key Pinch       3pt Pinch       2pt Pinch          Sensation: Intact per report     CMS Impairment/Limitation/Restriction for FOTO  Survey  Therapist reviewed FOTO scores for Joyce Peterson.  FOTO documents entered into [employer] - see Media section.     Intake Limitation Score: 72% - 11/1/2018  Category: Self-Care     Current : CL= at least 60% but < 80% impaired, limited or restricted  Goal: CK = at least 40% but < 60% impaired, limited or restricted  Discharge: TBD                 Joyce received the following supervised modalities after being cleared for contradictions for 10 minutes:   -Patient received MH to left elbow to increase blood flow, circulation and tissue elasticity prior to therex        Joyce received the following direct contact modalities after being cleared for contraindications for 0 minutes:  -none today     Joyce received the following manual therapy techniques for 10 minutes:   -Performed RM to FA  to decrease edema, improve joint mobility, decrease pain and improve lymphatic drainage to increase hand function.   -Performed scar massage to  Incision to decrease adhesions and improve tensile glide.          Joyce received therapeutic exercises for 15 minutes including:  -  AROM gentle in brace: Flexion and gravity assisted extension 90-25 degrees x 10 rep   AROM wrist flex/ext, RD/UD gentle. In brace X 10 reps                                                                Home Exercises and Education Provided     Education provided: Tendon tissue healing, continued brace wear, locking at 90 for sleep and between exercises. No weightbearing. Pt. Verbalized understanding.   - Progress towards goals     Written Home Exercises Provided: Patient instructed to cont prior HEP.  Exercises were reviewed and Joyce was able to demonstrate them prior to the end of the session.  Joyce demonstrated good  understanding of the education provided.   .   See EMR under Patient Instructions for exercises provided prior visit.     Assessment     Joyce Peterson is a 58 y.o. female referred to outpatient occupational/hand therapy and  presents with a medical diagnosis of Left triceps tear s/p repair 3 weeks.She is experiencing some achiness in the elbow but otherwise no pain. Unlocked brace 5 more degrees per protocol. Continued 90 degree flexion block at this time. Pt. States she is having some pain in the right shoulder due to over using right arm.     Joyce is progressing well towards her goals and there are no updates to goals at this time. Pt prognosis continues as Excellent. Pt will continue to benefit from skilled outpatient occupational therapy to address the deficits listed in the problem list on initial evaluation, provide pt/family education and to maximize pt's level of independence in the home and community environment.     Anticipated barriers to continued occupational therapy: none    Pt's spiritual, cultural and educational needs considered and pt agreeable to plan of care and goals.    Goals     Long Term Goals (LTGs); to be met by discharge.  LTG #1: Pt will report a pain level of 0 out of 10 at rest.  LTG #2: Pt will demo improved FOTO score by 19 points.   LTG #3: Pt will return to prior level of function for ADLs and household management.      Short Term Goals (STGs); to be met within 6 weeks (12/15/2018).  STG #1a: Pt will report 2 out of 10 pain level at rest.  STG #2b: Pt will demonstrate independence with issued HEP.   STG #3b: Pt will demo improved elbow ROM to WFL (  degrees Actively).    Plan     Continue skilled occupational therapy with individualized plan of care 2x/week for 8 weeks from 11/1/2018 to 12/31/2018  Pt. To come 1xwk until able to progress per protocol.    Discussed Plan of Care with patient: Yes  Updates/Grading for next session: cont to progress per protocol.    Clare Cruz, OTD, OTR/L, CHT   Occupational therapist, Certified Hand Therapist

## 2018-11-13 ENCOUNTER — LAB VISIT (OUTPATIENT)
Dept: LAB | Facility: HOSPITAL | Age: 58
End: 2018-11-13
Attending: INTERNAL MEDICINE
Payer: MEDICARE

## 2018-11-13 ENCOUNTER — CLINICAL SUPPORT (OUTPATIENT)
Dept: REHABILITATION | Facility: HOSPITAL | Age: 58
End: 2018-11-13
Payer: MEDICARE

## 2018-11-13 DIAGNOSIS — D50.0 IRON DEFICIENCY ANEMIA DUE TO CHRONIC BLOOD LOSS: ICD-10-CM

## 2018-11-13 DIAGNOSIS — D80.1 HYPOGAMMAGLOBULINEMIA: ICD-10-CM

## 2018-11-13 DIAGNOSIS — M06.9 RHEUMATOID ARTHRITIS OF HAND, UNSPECIFIED LATERALITY, UNSPECIFIED RHEUMATOID FACTOR PRESENCE: ICD-10-CM

## 2018-11-13 DIAGNOSIS — Z79.631 METHOTREXATE, LONG TERM, CURRENT USE: ICD-10-CM

## 2018-11-13 DIAGNOSIS — M25.522 PAIN IN LEFT ELBOW: ICD-10-CM

## 2018-11-13 LAB
ALBUMIN SERPL BCP-MCNC: 3.7 G/DL
ALP SERPL-CCNC: 75 U/L
ALT SERPL W/O P-5'-P-CCNC: 28 U/L
ANION GAP SERPL CALC-SCNC: 8 MMOL/L
AST SERPL-CCNC: 29 U/L
BASOPHILS # BLD AUTO: 0.06 K/UL
BASOPHILS NFR BLD: 0.8 %
BILIRUB SERPL-MCNC: 0.4 MG/DL
BUN SERPL-MCNC: 14 MG/DL
CALCIUM SERPL-MCNC: 9.2 MG/DL
CHLORIDE SERPL-SCNC: 106 MMOL/L
CO2 SERPL-SCNC: 26 MMOL/L
CREAT SERPL-MCNC: 0.8 MG/DL
CRP SERPL-MCNC: 0.5 MG/L
DIFFERENTIAL METHOD: ABNORMAL
EOSINOPHIL # BLD AUTO: 0 K/UL
EOSINOPHIL NFR BLD: 0.4 %
ERYTHROCYTE [DISTWIDTH] IN BLOOD BY AUTOMATED COUNT: 13.8 %
ERYTHROCYTE [SEDIMENTATION RATE] IN BLOOD BY WESTERGREN METHOD: 4 MM/HR
EST. GFR  (AFRICAN AMERICAN): >60 ML/MIN/1.73 M^2
EST. GFR  (NON AFRICAN AMERICAN): >60 ML/MIN/1.73 M^2
FERRITIN SERPL-MCNC: 67 NG/ML
GLUCOSE SERPL-MCNC: 105 MG/DL
HCT VFR BLD AUTO: 41.6 %
HGB BLD-MCNC: 13.4 G/DL
IGA SERPL-MCNC: 121 MG/DL
IGG SERPL-MCNC: 666 MG/DL
IGM SERPL-MCNC: 30 MG/DL
IMM GRANULOCYTES # BLD AUTO: 0.02 K/UL
IMM GRANULOCYTES NFR BLD AUTO: 0.3 %
LYMPHOCYTES # BLD AUTO: 1.2 K/UL
LYMPHOCYTES NFR BLD: 17.1 %
MCH RBC QN AUTO: 30.5 PG
MCHC RBC AUTO-ENTMCNC: 32.2 G/DL
MCV RBC AUTO: 95 FL
MONOCYTES # BLD AUTO: 0.7 K/UL
MONOCYTES NFR BLD: 10 %
NEUTROPHILS # BLD AUTO: 5.2 K/UL
NEUTROPHILS NFR BLD: 71.4 %
NRBC BLD-RTO: 0 /100 WBC
PLATELET # BLD AUTO: 315 K/UL
PMV BLD AUTO: 11.1 FL
POTASSIUM SERPL-SCNC: 3.7 MMOL/L
PROT SERPL-MCNC: 6.6 G/DL
RBC # BLD AUTO: 4.4 M/UL
SODIUM SERPL-SCNC: 140 MMOL/L
WBC # BLD AUTO: 7.21 K/UL

## 2018-11-13 PROCEDURE — 97110 THERAPEUTIC EXERCISES: CPT | Mod: PN

## 2018-11-13 PROCEDURE — 82784 ASSAY IGA/IGD/IGG/IGM EACH: CPT | Mod: 59

## 2018-11-13 PROCEDURE — 85652 RBC SED RATE AUTOMATED: CPT

## 2018-11-13 PROCEDURE — 82787 IGG 1 2 3 OR 4 EACH: CPT | Mod: 59

## 2018-11-13 PROCEDURE — 82728 ASSAY OF FERRITIN: CPT

## 2018-11-13 PROCEDURE — 86140 C-REACTIVE PROTEIN: CPT

## 2018-11-13 PROCEDURE — 80053 COMPREHEN METABOLIC PANEL: CPT

## 2018-11-13 PROCEDURE — 85025 COMPLETE CBC W/AUTO DIFF WBC: CPT

## 2018-11-13 PROCEDURE — 97140 MANUAL THERAPY 1/> REGIONS: CPT | Mod: PN

## 2018-11-13 NOTE — PROGRESS NOTES
"  Occupational Therapy Daily Treatment Note      Date: 11/13/2018  Name: Joyce Peterson  Clinic Number: [id number]  Name: Joyce Peterson  Clinic Number: [id number]     Medical Diagnosis: Left triceps rupture s/p repair   Date of Onset:~ Sept 20th  Date of Surgery: 10/17/2018  Surgical Procedure: Left triceps repair with Arthrex fiberwire  Therapy Diagnosis:        Encounter Diagnosis   Name Primary?    Pain in left elbow        Precautions: Standard- continuous brace wear, no hernández/pro, slower healing times due to RA and steroid use.      Physician: Kymberly Noriega PA via Dr. Yarbrough  Physician Orders: eval and treat- Elbow ROM  degrees, increasing by 10 degrees each week, no FA rotation.   Date of Return to MD: 11/21/2018     Evaluation Date: 11/1/2018  Plan of Care Certification Period: 11/01/2018-12/30/2018     Visit #: / Visits authorized: 3/50  Insurance Authorization period Expiration: 12/31/2018    Time In:1000AM  Time Out: 1040AM  Total Billable Time: 25 minutes  Charges for this Visit: MT, TE     Subjective     Pt reports: " I feel like I can't get it all the way straight in to wear the brace would let me go even if I try to gently push it there. If I do then I feel that muscle quivering a little bit (points to triceps)" Continued with education about no active elbow extension due to activation of triceps muscle pulling on repair. Pt. Verbalized understanding.   she was compliant with home exercise program given last session.   Response to previous treatment:slight achiness reported  Functional change: none    Pain: 1/10  Location: left elbow- achy feeling    Objective   Observation/Appearance: Pt. Presents with hinged elbow brace at 30-90 degrees unlocked. - Unlocked per protocol today to 25-90     Elbow and Wrist ROM. Measured in degrees.    11/1/2018 11/1/2018 11/13/2018     Right Left* Left     AROM AROM AROM   Elbow Ext/Flex 0/153 30-90 in brace 25-90   Supination/Pronation 55/90 45/80 70/80 "   Wrist Ext/Flex WNL 55/30 55/30   Wrist RD/UD WNL 15/15 15/15             Strength (Dynamometer) and Pinch Strength (Pinch Gauge)  Measured in pounds.    11/1/2018 11/1/2018     Left Right   Rung II Deferred due to protocol Deferred due to protocol   Key Pinch       3pt Pinch       2pt Pinch          Sensation: Intact per report     CMS Impairment/Limitation/Restriction for FOTO Survey  Therapist reviewed FOTO scores for Joyce Peterson.  FOTO documents entered into [employer] - see Media section.     Intake Limitation Score: 72% - 11/1/2018  Category: Self-Care     Current : CL= at least 60% but < 80% impaired, limited or restricted  Goal: CK = at least 40% but < 60% impaired, limited or restricted  Discharge: TBD                 Jocye received the following supervised modalities after being cleared for contradictions for 10 minutes:   -Patient received MH to left elbow to increase blood flow, circulation and tissue elasticity prior to therex        Joyce received the following direct contact modalities after being cleared for contraindications for 0 minutes:  -none today     Joyce received the following manual therapy techniques for 10 minutes:   -Performed RM to FA  to decrease edema, improve joint mobility, decrease pain and improve lymphatic drainage to increase hand function.   -Performed scar massage to  Incision to decrease adhesions and improve tensile glide.          Joyce received therapeutic exercises for 15 minutes including:  -  AROM gentle in brace: Flexion and gravity assisted extension 90-25 degrees x 10 rep   AROM wrist flex/ext, RD/UD gentle. In brace X 10 reps   Shoulder    scapula squeezes       x 10 reps                                                       Home Exercises and Education Provided     Education provided: Tendon tissue healing, continued brace wear, locking at 90 for sleep and between exercises. No weightbearing. Pt. Verbalized understanding.   - Progress towards goals      Written Home Exercises Provided: Patient instructed to cont prior HEP.  Exercises were reviewed and Joyce was able to demonstrate them prior to the end of the session.  Joyce demonstrated good  understanding of the education provided.   .   See EMR under Patient Instructions for exercises provided prior visit.     Assessment     Joyce Peterson is a 58 y.o. female referred to outpatient occupational/hand therapy and presents with a medical diagnosis of Left triceps tear s/p repair 4  weeks.She is experiencing some achiness in the elbow but otherwise no pain. Continued 90 degree flexion block at this time. Pt. States she is having some pain in the right shoulder due to over using right arm.     Joyce is progressing well towards her goals and there are no updates to goals at this time. Pt prognosis continues as Excellent. Pt will continue to benefit from skilled outpatient occupational therapy to address the deficits listed in the problem list on initial evaluation, provide pt/family education and to maximize pt's level of independence in the home and community environment.     Anticipated barriers to continued occupational therapy: none    Pt's spiritual, cultural and educational needs considered and pt agreeable to plan of care and goals.    Goals     Long Term Goals (LTGs); to be met by discharge.  LTG #1: Pt will report a pain level of 0 out of 10 at rest.  LTG #2: Pt will demo improved FOTO score by 19 points.   LTG #3: Pt will return to prior level of function for ADLs and household management.      Short Term Goals (STGs); to be met within 6 weeks (12/15/2018).  STG #1a: Pt will report 2 out of 10 pain level at rest.  STG #2b: Pt will demonstrate independence with issued HEP.   STG #3b: Pt will demo improved elbow ROM to WFL (  degrees Actively).    Plan     Continue skilled occupational therapy with individualized plan of care 2x/week for 8 weeks from 11/1/2018 to 12/31/2018  Pt. To come 1xwk  until able to progress per protocol.    Discussed Plan of Care with patient: Yes  Updates/Grading for next session: cont to progress per protocol.    KALEY Shannon, OTR/L, CHT   Occupational therapist, Certified Hand Therapist

## 2018-11-15 ENCOUNTER — TELEPHONE (OUTPATIENT)
Dept: RHEUMATOLOGY | Facility: CLINIC | Age: 58
End: 2018-11-15

## 2018-11-15 DIAGNOSIS — R76.8 LOW SERUM IGG1 AND IGM LEVELS: Primary | ICD-10-CM

## 2018-11-15 LAB
IGG1 SER-MCNC: 367 MG/DL
IGG2 SER-MCNC: 266 MG/DL
IGG3 SER-MCNC: 37 MG/DL
IGG4 SER-MCNC: 5 MG/DL

## 2018-11-15 NOTE — TELEPHONE ENCOUNTER
Please tell pt that I would like her to see Allergy-Immunology given the low IgM and low IgQ1 subclass, which may be associated with frequent sinus and pulmonary infections she has been getting. Please schedule. MEGA

## 2018-11-19 ENCOUNTER — OFFICE VISIT (OUTPATIENT)
Dept: RHEUMATOLOGY | Facility: CLINIC | Age: 58
End: 2018-11-19
Payer: MEDICARE

## 2018-11-19 ENCOUNTER — TELEPHONE (OUTPATIENT)
Dept: GASTROENTEROLOGY | Facility: CLINIC | Age: 58
End: 2018-11-19

## 2018-11-19 VITALS
DIASTOLIC BLOOD PRESSURE: 88 MMHG | SYSTOLIC BLOOD PRESSURE: 131 MMHG | HEART RATE: 108 BPM | BODY MASS INDEX: 26.29 KG/M2 | WEIGHT: 154 LBS | HEIGHT: 64 IN

## 2018-11-19 DIAGNOSIS — Z79.624 ENCOUNTER FOR LONG TERM CURRENT USE OF AZATHIOPRINE: Primary | ICD-10-CM

## 2018-11-19 PROCEDURE — 3008F BODY MASS INDEX DOCD: CPT | Mod: CPTII,S$GLB,, | Performed by: INTERNAL MEDICINE

## 2018-11-19 PROCEDURE — 99999 PR PBB SHADOW E&M-EST. PATIENT-LVL V: CPT | Mod: PBBFAC,,, | Performed by: INTERNAL MEDICINE

## 2018-11-19 PROCEDURE — 99214 OFFICE O/P EST MOD 30 MIN: CPT | Mod: S$GLB,,, | Performed by: INTERNAL MEDICINE

## 2018-11-19 ASSESSMENT — ROUTINE ASSESSMENT OF PATIENT INDEX DATA (RAPID3)
AM STIFFNESS SCORE: 1, YES
PATIENT GLOBAL ASSESSMENT SCORE: 6
FATIGUE SCORE: 6
MDHAQ FUNCTION SCORE: 1.9
PAIN SCORE: 7.5
TOTAL RAPID3 SCORE: 6.61
WHEN YOU AWAKENED IN THE MORNING OVER THE LAST WEEK, PLEASE INDICATE THE AMOUNT OF TIME IT TAKES UNTIL YOU ARE AS LIMBER AS YOU WILL BE FOR THE DAY: 24HR
PSYCHOLOGICAL DISTRESS SCORE: 3.3

## 2018-11-19 ASSESSMENT — DISEASE ACTIVITY SCORE (DAS28)
SWOLLEN_JOINTS_COUNT: 0
CRP_MG_PER_LITER: .5
ESR_MM_PER_HR: 4
TOTAL_SCORE_ESR: 4.67
TOTAL_SCORE_CRP: 4.8
GLOBAL_HEALTH_SCORE: 60
TENDER_JOINTS_COUNT: 26

## 2018-11-19 NOTE — PROGRESS NOTES
"Subjective:       Patient ID: Joyce Peterson is a 58 y.o. female.    Chief Complaint: RA    Last seen in clinic 8/21/18.  Patient has not yet been seen by Allergy-Immunology. She has been receiving Therapy s/p left triceps insertional repair. Patient taking Leflunomide 20mg daily, Tofacitinib 11mg daily, and Prednisone 5 mg daily. She stopped the MTX 1 week before her triceps and has not restarted it. She does not want to restart MTX. She says they are going to [place] next week. She feels like she is having a "flare". She also endorses dry eyes, red eyes, dry mouth, mouth ulcers, recent hair thinning/loss.      Review of Systems   Constitutional: Positive for fatigue. Negative for activity change, appetite change and fever.   HENT: Positive for mouth sores.    Eyes: Positive for redness.   Respiratory: Negative for chest tightness and shortness of breath.    Cardiovascular: Negative for chest pain and palpitations.   Gastrointestinal: Negative for abdominal pain.   Genitourinary: Negative for dysuria.   Musculoskeletal: Positive for arthralgias, back pain, joint swelling, myalgias and neck pain.   Skin: Negative for color change and rash.   Neurological: Negative for dizziness and light-headedness.   Psychiatric/Behavioral: Negative for behavioral problems.         Objective:   /88   Pulse 108   Ht 5' 3.6" (1.615 m)   Wt 69.9 kg (154 lb)   LMP  (LMP Unknown)   BMI 26.77 kg/m²      Physical Exam   Constitutional: She is oriented to person, place, and time and well-developed, well-nourished, and in no distress. No distress.   HENT:   Head: Normocephalic and atraumatic.   Right Ear: External ear normal.   Left Ear: External ear normal.   Mouth/Throat: No oropharyngeal exudate.   Eyes: Conjunctivae and EOM are normal. Right eye exhibits no discharge. Left eye exhibits no discharge. No scleral icterus.   Cardiovascular: Normal rate and regular rhythm.    Pulmonary/Chest: Effort normal and breath " sounds normal. No respiratory distress. She has no wheezes.   Abdominal: Bowel sounds are normal. She exhibits no distension.       Right Side Rheumatological Exam     Examination finds the 1st PIP, 2nd PIP, 3rd PIP, 3rd MCP, 4th PIP, 4th MCP, 5th PIP and 5th MCP normal.    The patient is tender to palpation of the shoulder, elbow, wrist and knee    She has swelling of the wrist    The patient has an enlarged 1st MCP and 2nd MCP    Knee Exam   Tenderness Location: popliteal fossa    Elbow/Wrist Exam   Tenderness Location: olecranon process    Range of Motion   Elbow   Supination: abnormal     Range of Motion   Wrist   Abduction: abnormal  Adduction: abnormal    Muscle Strength (0-5 scale):  Deltoid:  5  Biceps: 5/5   Triceps:  5  : 5/5   Iliopsoas: 5  Quadriceps:  5   Distal Lower Extremity: 5    Left Side Rheumatological Exam     Examination finds the shoulder, 1st PIP, 2nd PIP, 3rd PIP, 3rd MCP, 4th PIP, 4th MCP, 5th PIP and 5th MCP normal.    The patient is tender to palpation of the elbow, wrist and knee.    She has swelling of the wrist    The patient has an enlarged 1st MCP and 2nd MCP.    Knee Exam   Tenderness Location: popliteal fossa    Elbow/Wrist Exam   Tenderness Location: olecranon process    Range of Motion   Elbow   Supination: abnormal     Range of Motion   Wrist   Abduction: abnormal  Adduction: abnormal    Muscle Strength (0-5 scale):  :  5/5   Iliopsoas: 5  Quadriceps:  5   Distal Lower Extremity: 5      Neurological: She is alert and oriented to person, place, and time. She has normal reflexes. She displays normal reflexes. No cranial nerve deficit. She exhibits normal muscle tone. Gait normal. Coordination normal. GCS score is 15.   Reflex Scores:       Bicep reflexes are 2+ on the right side.       Brachioradialis reflexes are 2+ on the right side.       Patellar reflexes are 2+ on the right side and 2+ on the left side.       Achilles reflexes are 2+ on the right side and 2+ on the  left side.  Skin: Skin is warm and dry. No rash noted. She is not diaphoretic. No erythema. No pallor.     Psychiatric: Mood, memory, affect and judgment normal.   Musculoskeletal: She exhibits no edema or deformity.     Patient wearing L elbow brace     Results for TONNY GOMEZ (MRN [id number]) as of 11/19/2018 13:49   Ref. Range 11/13/2018 09:47   WBC Latest Ref Range: 3.90 - 12.70 K/uL 7.21   RBC Latest Ref Range: 4.00 - 5.40 M/uL 4.40   Hemoglobin Latest Ref Range: 12.0 - 16.0 g/dL 13.4   Hematocrit Latest Ref Range: 37.0 - 48.5 % 41.6   MCV Latest Ref Range: 82 - 98 fL 95   MCH Latest Ref Range: 27.0 - 31.0 pg 30.5   MCHC Latest Ref Range: 32.0 - 36.0 g/dL 32.2   RDW Latest Ref Range: 11.5 - 14.5 % 13.8   Platelets Latest Ref Range: 150 - 350 K/uL 315   MPV Latest Ref Range: 9.2 - 12.9 fL 11.1   Gran% Latest Ref Range: 38.0 - 73.0 % 71.4   Gran # (ANC) Latest Ref Range: 1.8 - 7.7 K/uL 5.2   Immature Granulocytes Latest Ref Range: 0.0 - 0.5 % 0.3   Immature Grans (Abs) Latest Ref Range: 0.00 - 0.04 K/uL 0.02   Lymph% Latest Ref Range: 18.0 - 48.0 % 17.1 (L)   Lymph # Latest Ref Range: 1.0 - 4.8 K/uL 1.2   Mono% Latest Ref Range: 4.0 - 15.0 % 10.0   Mono # Latest Ref Range: 0.3 - 1.0 K/uL 0.7   Eosinophil% Latest Ref Range: 0.0 - 8.0 % 0.4   Eos # Latest Ref Range: 0.0 - 0.5 K/uL 0.0   Basophil% Latest Ref Range: 0.0 - 1.9 % 0.8   Baso # Latest Ref Range: 0.00 - 0.20 K/uL 0.06   nRBC Latest Ref Range: 0 /100 WBC 0   Differential Method Unknown Automated   Ferritin Latest Ref Range: 20.0 - 300.0 ng/mL 67   Sed Rate Latest Ref Range: 0 - 36 mm/Hr 4   Sodium Latest Ref Range: 136 - 145 mmol/L 140   Potassium Latest Ref Range: 3.5 - 5.1 mmol/L 3.7   Chloride Latest Ref Range: 95 - 110 mmol/L 106   CO2 Latest Ref Range: 23 - 29 mmol/L 26   Anion Gap Latest Ref Range: 8 - 16 mmol/L 8   BUN, Bld Latest Ref Range: 6 - 20 mg/dL 14   Creatinine Latest Ref Range: 0.5 - 1.4 mg/dL 0.8   eGFR if non  Latest  Ref Range: >60 mL/min/1.73 m^2 >60.0   eGFR if African American Latest Ref Range: >60 mL/min/1.73 m^2 >60.0   Glucose Latest Ref Range: 70 - 110 mg/dL 105   Calcium Latest Ref Range: 8.7 - 10.5 mg/dL 9.2   Alkaline Phosphatase Latest Ref Range: 55 - 135 U/L 75   Total Protein Latest Ref Range: 6.0 - 8.4 g/dL 6.6   Albumin Latest Ref Range: 3.5 - 5.2 g/dL 3.7   Total Bilirubin Latest Ref Range: 0.1 - 1.0 mg/dL 0.4   AST Latest Ref Range: 10 - 40 U/L 29   ALT Latest Ref Range: 10 - 44 U/L 28   CRP Latest Ref Range: 0.0 - 8.2 mg/L 0.5   IgG - Serum Latest Ref Range: 650 - 1600 mg/dL 666   IgM Latest Ref Range: 50 - 300 mg/dL 30 (L)   IgA Latest Ref Range: 40 - 350 mg/dL 121   IgG 1 Latest Ref Range: 382 - 929 mg/dL 367 (L)   IgG 2 Latest Ref Range: 242 - 700 mg/dL 266   IgG 3 Latest Ref Range: 22 - 176 mg/dL 37   IgG 4 Latest Ref Range: 4 - 86 mg/dL 5       Assessment:     erosive RA discontinued long term methotrexate b/o nausea with po and sc and reduced dose, allergic to sulfa, and had reaction to hydroxychloroquine;  tolerating leflunomide and tofacitinib.  Has failed 3 TNFi(infliximab, etanercept, adalimumab), abatacept, hypogamma with rituximab and gets frequent infections in any event, acute vertigo/dizziness with tocilizumab x2  Other options in future: sarilumab sc(2nd IL-6r antibody( I would favor as had vertigo with single sc injection of tocilizumab but never took long enough to evaluate efficacy, certolizumab(4th anti-TNF, less likely efficacy), baricitinib(2nd USMAN inhibitor tofacitinib and likely not as effective at FDA approved dose); anakinra  Do not want to increase Prednisone while Tricep tendon is healing  IgG1 and IgM both low    Plan:   Continue tofacitinib-XR 11 mg daily, Leflunomide 20 mg daily, and Prednisone 5 mg daily  Referral to Allergy/Immunology  RTC in January to discuss Sarilumab

## 2018-11-19 NOTE — PATIENT INSTRUCTIONS
Flu shot, high dose (1st floor)  Return to Clinic in January to discuss Sarilumab  Appointment with Allergist/Immunologist

## 2018-11-19 NOTE — PROGRESS NOTES
I have personally taken the history and examined the patient and agree with the resident,s note as stated above   Fell chasing 6 yo granddaughter in Sept and incurred insertional tear triceps nearly complete and partial tear common extensor tendon. Repaired 10/17/18 still in adjustable cast. Off methotrexate since and doesn't wish to resume due to GI intolerance. Continues on tofacitinib and lefunomide.    Exam: multiple tender joints with OA/RA no synovitis    RA TJ 26 SJ 0 Pt global 60  ESR 4   CRP 0.5  DAS28 4/67 and YGH77-STT 4.8(both MDA)  Wishes to try something in place of tofacitinib. Had vertigo with tocilizumab. Only biologic MOA not significantly tried, IL-6r inhibition as only a few doses. Has failed 3/5 TNFi, abatacept, rituximab, and now tofacitinib.  Doesn't want change DMARD until after holidays  CVID        ACR handout on sarilumab provided  Cont tofacitinib-XR 11mg daily  Cont leflunomide 20mg daily  No oral/parenteral steroids given healing triceps tendon insertional repair  Refer to Immunology for CVID recurrent infections ? Sc Ig or IVIg  RTC 6 wks to change tofacitinib to sarilumab

## 2018-11-19 NOTE — TELEPHONE ENCOUNTER
----- Message from Cindy Julian sent at 11/19/2018  8:32 AM CST -----  Contact: self   Moore - wants to  her meds today - trying to get hold of ranjana jett - please call patient at

## 2018-11-20 ENCOUNTER — OFFICE VISIT (OUTPATIENT)
Dept: ORTHOPEDICS | Facility: CLINIC | Age: 58
End: 2018-11-20
Payer: MEDICARE

## 2018-11-20 ENCOUNTER — IMMUNIZATION (OUTPATIENT)
Dept: PHARMACY | Facility: CLINIC | Age: 58
End: 2018-11-20
Payer: MEDICARE

## 2018-11-20 VITALS
WEIGHT: 154 LBS | BODY MASS INDEX: 26.29 KG/M2 | HEIGHT: 64 IN | DIASTOLIC BLOOD PRESSURE: 80 MMHG | HEART RATE: 96 BPM | SYSTOLIC BLOOD PRESSURE: 129 MMHG

## 2018-11-20 DIAGNOSIS — Z47.89 ORTHOPEDIC AFTERCARE: ICD-10-CM

## 2018-11-20 DIAGNOSIS — S46.312A RUPTURE OF LEFT TRICEPS TENDON, INITIAL ENCOUNTER: Primary | ICD-10-CM

## 2018-11-20 PROCEDURE — 99999 PR PBB SHADOW E&M-EST. PATIENT-LVL V: CPT | Mod: PBBFAC,,, | Performed by: PHYSICIAN ASSISTANT

## 2018-11-20 PROCEDURE — 99024 POSTOP FOLLOW-UP VISIT: CPT | Mod: S$GLB,,, | Performed by: PHYSICIAN ASSISTANT

## 2018-11-20 NOTE — PROGRESS NOTES
"Subjective:       Patient ID: Joyce Peterson is a 58 y.o. female.    Chief Complaint: RA    Last seen in clinic 8/21/18.  Patient has not yet been seen by Allergy-Immunology. She has been receiving Therapy s/p left triceps insertional repair. Patient taking Leflunomide 20mg daily, Tofacitinib 11mg daily, and Prednisone 5 mg daily. She stopped the MTX 1 week before her triceps and has not restarted it. She does not want to restart MTX. She says they are going to [place] next week. She feels like she is having a "flare". She also endorses dry eyes, red eyes, dry mouth, mouth ulcers, recent hair thinning/loss.      Review of Systems   Constitutional: Positive for fatigue. Negative for activity change, appetite change and fever.   HENT: Positive for mouth sores.    Eyes: Positive for redness.   Respiratory: Negative for chest tightness and shortness of breath.    Cardiovascular: Negative for chest pain and palpitations.   Gastrointestinal: Negative for abdominal pain.   Genitourinary: Negative for dysuria.   Musculoskeletal: Positive for arthralgias, back pain, joint swelling, myalgias and neck pain.   Skin: Negative for color change and rash.   Neurological: Negative for dizziness and light-headedness.   Psychiatric/Behavioral: Negative for behavioral problems.         Objective:   /88   Pulse 108   Ht 5' 3.6" (1.615 m)   Wt 69.9 kg (154 lb)   LMP  (LMP Unknown)   BMI 26.77 kg/m²      Physical Exam   Constitutional: She is oriented to person, place, and time and well-developed, well-nourished, and in no distress. No distress.   HENT:   Head: Normocephalic and atraumatic.   Right Ear: External ear normal.   Left Ear: External ear normal.   Mouth/Throat: No oropharyngeal exudate.   Eyes: Conjunctivae and EOM are normal. Right eye exhibits no discharge. Left eye exhibits no discharge. No scleral icterus.   Cardiovascular: Normal rate and regular rhythm.    Pulmonary/Chest: Effort normal and breath " sounds normal. No respiratory distress. She has no wheezes.   Abdominal: Bowel sounds are normal. She exhibits no distension.       Right Side Rheumatological Exam     Examination finds the 1st PIP, 2nd PIP, 3rd PIP, 3rd MCP, 4th PIP, 4th MCP, 5th PIP and 5th MCP normal.    The patient is tender to palpation of the shoulder, elbow, wrist and knee    She has swelling of the wrist    The patient has an enlarged 1st MCP and 2nd MCP    Knee Exam   Tenderness Location: popliteal fossa    Elbow/Wrist Exam   Tenderness Location: olecranon process    Range of Motion   Elbow   Supination: abnormal     Range of Motion   Wrist   Abduction: abnormal  Adduction: abnormal    Muscle Strength (0-5 scale):  Deltoid:  5  Biceps: 5/5   Triceps:  5  : 5/5   Iliopsoas: 5  Quadriceps:  5   Distal Lower Extremity: 5    Left Side Rheumatological Exam     Examination finds the shoulder, 1st PIP, 2nd PIP, 3rd PIP, 3rd MCP, 4th PIP, 4th MCP, 5th PIP and 5th MCP normal.    The patient is tender to palpation of the elbow, wrist and knee.    She has swelling of the wrist    The patient has an enlarged 1st MCP and 2nd MCP.    Knee Exam   Tenderness Location: popliteal fossa    Elbow/Wrist Exam   Tenderness Location: olecranon process    Range of Motion   Elbow   Supination: abnormal     Range of Motion   Wrist   Abduction: abnormal  Adduction: abnormal    Muscle Strength (0-5 scale):  :  5/5   Iliopsoas: 5  Quadriceps:  5   Distal Lower Extremity: 5      Neurological: She is alert and oriented to person, place, and time. She has normal reflexes. She displays normal reflexes. No cranial nerve deficit. She exhibits normal muscle tone. Gait normal. Coordination normal. GCS score is 15.   Reflex Scores:       Bicep reflexes are 2+ on the right side.       Brachioradialis reflexes are 2+ on the right side.       Patellar reflexes are 2+ on the right side and 2+ on the left side.       Achilles reflexes are 2+ on the right side and 2+ on the  left side.  Skin: Skin is warm and dry. No rash noted. She is not diaphoretic. No erythema. No pallor.     Psychiatric: Mood, memory, affect and judgment normal.   Musculoskeletal: She exhibits no edema or deformity.     Patient wearing L elbow brace     Results for TONNY GOMEZ (MRN [id number]) as of 11/19/2018 13:49   Ref. Range 11/13/2018 09:47   WBC Latest Ref Range: 3.90 - 12.70 K/uL 7.21   RBC Latest Ref Range: 4.00 - 5.40 M/uL 4.40   Hemoglobin Latest Ref Range: 12.0 - 16.0 g/dL 13.4   Hematocrit Latest Ref Range: 37.0 - 48.5 % 41.6   MCV Latest Ref Range: 82 - 98 fL 95   MCH Latest Ref Range: 27.0 - 31.0 pg 30.5   MCHC Latest Ref Range: 32.0 - 36.0 g/dL 32.2   RDW Latest Ref Range: 11.5 - 14.5 % 13.8   Platelets Latest Ref Range: 150 - 350 K/uL 315   MPV Latest Ref Range: 9.2 - 12.9 fL 11.1   Gran% Latest Ref Range: 38.0 - 73.0 % 71.4   Gran # (ANC) Latest Ref Range: 1.8 - 7.7 K/uL 5.2   Immature Granulocytes Latest Ref Range: 0.0 - 0.5 % 0.3   Immature Grans (Abs) Latest Ref Range: 0.00 - 0.04 K/uL 0.02   Lymph% Latest Ref Range: 18.0 - 48.0 % 17.1 (L)   Lymph # Latest Ref Range: 1.0 - 4.8 K/uL 1.2   Mono% Latest Ref Range: 4.0 - 15.0 % 10.0   Mono # Latest Ref Range: 0.3 - 1.0 K/uL 0.7   Eosinophil% Latest Ref Range: 0.0 - 8.0 % 0.4   Eos # Latest Ref Range: 0.0 - 0.5 K/uL 0.0   Basophil% Latest Ref Range: 0.0 - 1.9 % 0.8   Baso # Latest Ref Range: 0.00 - 0.20 K/uL 0.06   nRBC Latest Ref Range: 0 /100 WBC 0   Differential Method Unknown Automated   Ferritin Latest Ref Range: 20.0 - 300.0 ng/mL 67   Sed Rate Latest Ref Range: 0 - 36 mm/Hr 4   Sodium Latest Ref Range: 136 - 145 mmol/L 140   Potassium Latest Ref Range: 3.5 - 5.1 mmol/L 3.7   Chloride Latest Ref Range: 95 - 110 mmol/L 106   CO2 Latest Ref Range: 23 - 29 mmol/L 26   Anion Gap Latest Ref Range: 8 - 16 mmol/L 8   BUN, Bld Latest Ref Range: 6 - 20 mg/dL 14   Creatinine Latest Ref Range: 0.5 - 1.4 mg/dL 0.8   eGFR if non  Latest  Ref Range: >60 mL/min/1.73 m^2 >60.0   eGFR if African American Latest Ref Range: >60 mL/min/1.73 m^2 >60.0   Glucose Latest Ref Range: 70 - 110 mg/dL 105   Calcium Latest Ref Range: 8.7 - 10.5 mg/dL 9.2   Alkaline Phosphatase Latest Ref Range: 55 - 135 U/L 75   Total Protein Latest Ref Range: 6.0 - 8.4 g/dL 6.6   Albumin Latest Ref Range: 3.5 - 5.2 g/dL 3.7   Total Bilirubin Latest Ref Range: 0.1 - 1.0 mg/dL 0.4   AST Latest Ref Range: 10 - 40 U/L 29   ALT Latest Ref Range: 10 - 44 U/L 28   CRP Latest Ref Range: 0.0 - 8.2 mg/L 0.5   IgG - Serum Latest Ref Range: 650 - 1600 mg/dL 666   IgM Latest Ref Range: 50 - 300 mg/dL 30 (L)   IgA Latest Ref Range: 40 - 350 mg/dL 121   IgG 1 Latest Ref Range: 382 - 929 mg/dL 367 (L)   IgG 2 Latest Ref Range: 242 - 700 mg/dL 266   IgG 3 Latest Ref Range: 22 - 176 mg/dL 37   IgG 4 Latest Ref Range: 4 - 86 mg/dL 5       Assessment:     erosive RA discontinued long term methotrexate b/o nausea with po and sc and reduced dose, allergic to sulfa, and had reaction to hydroxychloroquine;  tolerating leflunomide and tofacitinib.  Has failed 3 TNFi(infliximab, etanercept, adalimumab), abatacept, hypogamma with rituximab and gets frequent infections in any event, acute vertigo/dizziness with tocilizumab x2  Other options in future: sarilumab sc(2nd IL-6r antibody( I would favor as had vertigo with single sc injection of tocilizumab but never took long enough to evaluate efficacy, certolizumab(4th anti-TNF, less likely efficacy), baricitinib(2nd USMAN inhibitor tofacitinib and likely not as effective at FDA approved dose); anakinra  Do not want to increase Prednisone while Tricep tendon is healing  IgG1 and IgM both low    Plan:   Continue tofacitinib-XR 11 mg daily, Leflunomide 20 mg daily, and Prednisone 5 mg daily  Referral to Allergy/Immunology  RTC in January to discuss Sarilumab

## 2018-11-20 NOTE — PROGRESS NOTES
"Ms. Peterson is here today for a post-operative visit.  She is 34 days status post Primary repair of left triceps tendon by Dr. Yarbrough on 10/17/18. She reports that she is doing well.  Pain is mild-moderate, 4/10.  She has attended a few OT sessions, she is in the brace full time.  She denies fever, chills, and sweats since the time of the surgery.     Physical exam:    Vitals:    11/20/18 1150   BP: 129/80   Pulse: 96   Weight: 69.9 kg (154 lb)   Height: 5' 3.6" (1.615 m)   PainSc:   4     Vital signs are stable, patient is afebrile.  Patient is well dressed and well groomed, no acute distress.  Alert and oriented to person, place, and time.  Incision is healing well - clean, dry and intact.  There is no erythema or exudate.  There is no sign of any infection. She is NVI. Small vicryl suture tails removed today.  Good finger and wrist ROM. Good elbow ROM within brace.    Assessment: 34 days status post Primary repair of left triceps tendon    Plan:  Joyce was seen today for post-op evaluation.    Diagnoses and all orders for this visit:    Rupture of left triceps tendon, initial encounter    Orthopedic aftercare        - PO instruction reviewed and provided to patient  - Elbow brace, full time use.  - OT as scheduled  - Call with questions or concerns  - Follow up in 6 weeks    "

## 2018-11-21 ENCOUNTER — CLINICAL SUPPORT (OUTPATIENT)
Dept: REHABILITATION | Facility: HOSPITAL | Age: 58
End: 2018-11-21
Payer: MEDICARE

## 2018-11-21 DIAGNOSIS — M25.522 PAIN IN LEFT ELBOW: ICD-10-CM

## 2018-11-21 PROCEDURE — 97140 MANUAL THERAPY 1/> REGIONS: CPT | Mod: PN

## 2018-11-21 PROCEDURE — 97110 THERAPEUTIC EXERCISES: CPT | Mod: PN

## 2018-11-21 NOTE — PROGRESS NOTES
"  Occupational Therapy Daily Treatment Note      Date: 11/21/2018  Name: Joyce Peterson  Clinic Number: [id number]  Name: Joyce Peterson  Clinic Number: [id number]     Medical Diagnosis: Left triceps rupture s/p repair   Date of Onset:~ Sept 20th  Date of Surgery: 10/17/2018  Surgical Procedure: Left triceps repair with Arthrex fiberwire  Therapy Diagnosis:        Encounter Diagnosis   Name Primary?    Pain in left elbow        Precautions: Standard- continuous brace wear, no hernández/pro, slower healing times due to RA and steroid use.      Physician: Kymberly Noriega PA via Dr. Yarbrough  Physician Orders: eval and treat- Elbow ROM  degrees, increasing by 10 degrees each week, no FA rotation.   Date of Return to MD: 11/21/2018     Evaluation Date: 11/1/2018  Plan of Care Certification Period: 11/01/2018-12/30/2018     Visit #: / Visits authorized: 4/50  Insurance Authorization period Expiration: 12/31/2018    Time In:9AM  Time Out: 945AM  Total Billable Time: 25 minutes  Charges for this Visit: MT, ILYA     Subjective     Pt reports: "It's been aching. They said I have to wear the brace for 6 more weeks"   she was compliant with home exercise program given last session.   Response to previous treatment:slight achiness reported  Functional change: none    Pain: 1/10  Location: left elbow- achy feeling    Objective   Observation/Appearance: Pt. Presents with hinged elbow brace at 30-90 degrees unlocked. - Unlocked per protocol today to 25-90     Elbow and Wrist ROM. Measured in degrees.    11/1/2018 11/1/2018 11/13/2018     Right Left* Left     AROM AROM AROM   Elbow Ext/Flex 0/153 30-90 in brace 25-90   Supination/Pronation 55/90 45/80 70/80   Wrist Ext/Flex WNL 55/30 55/30   Wrist RD/UD WNL 15/15 15/15             Strength (Dynamometer) and Pinch Strength (Pinch Gauge)  Measured in pounds.    11/1/2018 11/1/2018     Left Right   Rung II Deferred due to protocol Deferred due to protocol   Key Pinch       3pt " Pinch       2pt Pinch          Sensation: Intact per report     CMS Impairment/Limitation/Restriction for FOTO Survey  Therapist reviewed FOTO scores for Joyce Peterson.  FOTO documents entered into [employer] - see Media section.     Intake Limitation Score: 72% - 11/1/2018  Category: Self-Care     Current : CL= at least 60% but < 80% impaired, limited or restricted  Goal: CK = at least 40% but < 60% impaired, limited or restricted  Discharge: TBD                 Joyce received the following supervised modalities after being cleared for contradictions for 10 minutes:   -Patient received MH to left elbow to increase blood flow, circulation and tissue elasticity prior to therex        Joyce received the following direct contact modalities after being cleared for contraindications for 0 minutes:  -none today     Joyce received the following manual therapy techniques for 10 minutes:   -Performed RM to FA  to decrease edema, improve joint mobility, decrease pain and improve lymphatic drainage to increase hand function.   -Performed scar massage to  Incision to decrease adhesions and improve tensile glide.          Joyce received therapeutic exercises for 15 minutes including:  -  AROM gentle in brace: Flexion and gravity assisted extension 90-25 degrees x 10 rep   AROM wrist flex/ext, RD/UD gentle. In brace X 10 reps   Shoulder    scapula squeezes       x 10 reps                                                       Home Exercises and Education Provided     Education provided: Tendon tissue healing, continued brace wear, locking at 90 for sleep and between exercises. No weightbearing. Pt. Verbalized understanding.   - Progress towards goals     Written Home Exercises Provided: Patient instructed to cont prior HEP.  Exercises were reviewed and Joyce was able to demonstrate them prior to the end of the session.  Joyce demonstrated good  understanding of the education provided.   .   See EMR under Patient Instructions  for exercises provided prior visit.     Assessment     Joyce Peterson is a 58 y.o. female referred to outpatient occupational/hand therapy and presents with a medical diagnosis of Left triceps tear s/p repair 5  weeks.She is experiencing some achiness in the elbow but otherwise no pain. Continued 90 degree flexion block at this time. Pt. States she had difficulty in the ER with the anesthesiologist giving her Zofran that she is documented to be allergic to. She also reports the nerve block she was given post-operatively caused issues with her lung/breathing. Pt. Is moving well and will continue with 90 degree flx block until next week when she returns from vacation. Pt. In agreement with plan.     Joyce is progressing well towards her goals and there are no updates to goals at this time. Pt prognosis continues as Excellent. Pt will continue to benefit from skilled outpatient occupational therapy to address the deficits listed in the problem list on initial evaluation, provide pt/family education and to maximize pt's level of independence in the home and community environment.     Anticipated barriers to continued occupational therapy: none    Pt's spiritual, cultural and educational needs considered and pt agreeable to plan of care and goals.    Goals     Long Term Goals (LTGs); to be met by discharge.  LTG #1: Pt will report a pain level of 0 out of 10 at rest.  LTG #2: Pt will demo improved FOTO score by 19 points.   LTG #3: Pt will return to prior level of function for ADLs and household management.      Short Term Goals (STGs); to be met within 6 weeks (12/15/2018).  STG #1a: Pt will report 2 out of 10 pain level at rest.  STG #2b: Pt will demonstrate independence with issued HEP.   STG #3b: Pt will demo improved elbow ROM to WFL (  degrees Actively).    Plan     Continue skilled occupational therapy with individualized plan of care 2x/week for 8 weeks from 11/1/2018 to 12/31/2018  Pt. To come 1xwk  until able to progress per protocol.    Discussed Plan of Care with patient: Yes  Updates/Grading for next session: cont to progress per protocol.    KALEY Shannon, OTR/L, CHT   Occupational therapist, Certified Hand Therapist

## 2018-11-26 DIAGNOSIS — Z00.6 PATIENT IN CLINICAL RESEARCH STUDY: ICD-10-CM

## 2018-11-26 DIAGNOSIS — K31.84 GASTROPARESIS: Primary | ICD-10-CM

## 2018-12-04 ENCOUNTER — CLINICAL SUPPORT (OUTPATIENT)
Dept: REHABILITATION | Facility: HOSPITAL | Age: 58
End: 2018-12-04
Payer: MEDICARE

## 2018-12-04 DIAGNOSIS — M25.522 PAIN IN LEFT ELBOW: ICD-10-CM

## 2018-12-04 PROCEDURE — 97140 MANUAL THERAPY 1/> REGIONS: CPT | Mod: PN

## 2018-12-04 PROCEDURE — 97110 THERAPEUTIC EXERCISES: CPT | Mod: PN

## 2018-12-04 NOTE — PROGRESS NOTES
"  Occupational Therapy Daily Treatment Note      Date: 12/4/2018  Name: Joyce Peterson  Clinic Number: [id number]  Name: Joyce Peterson  Clinic Number: [id number]     Medical Diagnosis: Left triceps rupture s/p repair   Date of Onset:~ Sept 20th  Date of Surgery: 10/17/2018  Surgical Procedure: Left triceps repair with Arthrex fiberwire  Therapy Diagnosis:        Encounter Diagnosis   Name Primary?    Pain in left elbow        Precautions: Standard- continuous brace wear, no hernández/pro, slower healing times due to RA and steroid use.      Physician: Kymberly Noriega PA via Dr. Yarbrough  Physician Orders: eval and treat- per protocol- begin gentle wean from elbow brace   Date of Return to MD: 11/21/2018     Evaluation Date: 11/1/2018  Plan of Care Certification Period: 11/01/2018-12/30/2018     Visit #: / Visits authorized: 5/50  Insurance Authorization period Expiration: 12/31/2018    Time In:945AM  Time Out: 1030AM  Total Billable Time: 30 minutes  Charges for this Visit: MTILYA     Subjective     Pt reports: "It's feeling ok, I just sameer feel like its still a little swollen, it aches"    she was compliant with home exercise program given last session.   Response to previous treatment:slight achiness reported  Functional change: none    Pain: 1/10  Location: left elbow- achy feeling    Objective   Observation/Appearance: Pt. Presents with hinged elbow brace at 30-90 degrees unlocked. - Unlocked per protocol today to      Elbow and Wrist ROM. Measured in degrees.    11/1/2018 11/1/2018 11/13/2018 12/4/2018     Right Left* Left Left     AROM AROM AROM AROM   Elbow Ext/Flex 0/153 30-90 in brace 25-90 18/130   Supination/Pronation 55/90 45/80 70/80 60/90   Wrist Ext/Flex WNL 55/30 55/30 45/40   Wrist RD/UD WNL 15/15 15/15 15/25             Strength (Dynamometer) and Pinch Strength (Pinch Gauge)  Measured in pounds.    11/1/2018 11/1/2018     Left Right   Rung II Deferred due to protocol Deferred due to " protocol   Key Pinch       3pt Pinch       2pt Pinch          Sensation: Intact per report     CMS Impairment/Limitation/Restriction for FOTO Survey  Therapist reviewed FOTO scores for Joyce Peterson.  FOTO documents entered into [employer] - see Media section.     Intake Limitation Score: 72% - 11/1/2018  5th visit Limitation Score: NEXT VISIT  Category: Self-Care     Current : CL= at least 60% but < 80% impaired, limited or restricted  Goal: CK = at least 40% but < 60% impaired, limited or restricted  Discharge: TBD                 Joyce received the following supervised modalities after being cleared for contradictions for 10 minutes:   -Patient received MH to left elbow to increase blood flow, circulation and tissue elasticity prior to therex        Joyce received the following direct contact modalities after being cleared for contraindications for 0 minutes:  -none today     Joyce received the following manual therapy techniques for 10 minutes:   -Performed RM to FA  to decrease edema, improve joint mobility, decrease pain and improve lymphatic drainage to increase hand function.   -Performed scar massage to  Incision to decrease adhesions and improve tensile glide.          Joyce received therapeutic exercises for 20 minutes including:  -  AROM gentle in brace: Flexion and gravity assisted extension  x 3/10 within available range   AROM   Sup/Pro  wrist flex/ext, RD/UD gentle.  X 3/10 reps   Shoulder    scapula squeezes  Shoulder rolls       x 3/10 reps                                                       Home Exercises and Education Provided     Education provided: Tendon tissue healing, continued brace wear, locking at 90 for sleep and between exercises. No weightbearing. Pt. Verbalized understanding.   - Progress towards goals     Written Home Exercises Provided: Patient instructed to cont prior HEP.  Exercises were reviewed and Joyce was able to demonstrate them prior to the end of the session.  Joyce  demonstrated good  understanding of the education provided.   .   See EMR under Patient Instructions for exercises provided prior visit.     Assessment     Joyce Peterson is a 58 y.o. female referred to outpatient occupational/hand therapy and presents with a medical diagnosis of Left triceps tear s/p repair 7 weeks.She is experiencing some achiness in the elbow but otherwise no pain. Increased AROM in clinic to within available range for flexion and gravity assisted extension only. Pt. With slight losses of sup/pro, wrist ext/flx this date. Good ROM to elbow. Plan to begin light strengthening next week- 8 weeks post op.      Joyce is progressing well towards her goals and there are no updates to goals at this time. Pt prognosis continues as Excellent. Pt will continue to benefit from skilled outpatient occupational therapy to address the deficits listed in the problem list on initial evaluation, provide pt/family education and to maximize pt's level of independence in the home and community environment.     Anticipated barriers to continued occupational therapy: none    Pt's spiritual, cultural and educational needs considered and pt agreeable to plan of care and goals.    Goals     Long Term Goals (LTGs); to be met by discharge.  LTG #1: Pt will report a pain level of 0 out of 10 at rest.  LTG #2: Pt will demo improved FOTO score by 19 points.   LTG #3: Pt will return to prior level of function for ADLs and household management.      Short Term Goals (STGs); to be met within 6 weeks (12/15/2018).  STG #1a: Pt will report 2 out of 10 pain level at rest.  STG #2b: Pt will demonstrate independence with issued HEP.   STG #3b: Pt will demo improved elbow ROM to WFL (  degrees Actively). MET    Plan     Continue skilled occupational therapy with individualized plan of care 2x/week for 8 weeks from 11/1/2018 to 12/31/2018  Pt. To come 1xwk until able to progress per protocol- start 2xwk after 8  weeks    Discussed Plan of Care with patient: Yes  Updates/Grading for next session: cont to progress per protocol., start gentle strengthening at 8 weeks post op.     Clare Cruz OTD, OTR/L, CHT   Occupational therapist, Certified Hand Therapist

## 2018-12-06 DIAGNOSIS — Z00.6 PATIENT IN CLINICAL RESEARCH STUDY: ICD-10-CM

## 2018-12-10 DIAGNOSIS — K31.84 GASTROPARESIS: Primary | ICD-10-CM

## 2018-12-11 ENCOUNTER — CLINICAL SUPPORT (OUTPATIENT)
Dept: REHABILITATION | Facility: HOSPITAL | Age: 58
End: 2018-12-11
Payer: MEDICARE

## 2018-12-11 DIAGNOSIS — M25.522 PAIN IN LEFT ELBOW: ICD-10-CM

## 2018-12-11 PROCEDURE — 97110 THERAPEUTIC EXERCISES: CPT | Mod: PN

## 2018-12-11 PROCEDURE — 97140 MANUAL THERAPY 1/> REGIONS: CPT | Mod: PN

## 2018-12-11 NOTE — PROGRESS NOTES
"  Occupational Therapy Daily Treatment Note      Date: 12/11/2018  Name: Joyce Peterson  Clinic Number: [id number]  Name: Joyce Peterson  Clinic Number: [id number]     Medical Diagnosis: Left triceps rupture s/p repair   Date of Onset:~ Sept 20th  Date of Surgery: 10/17/2018  Surgical Procedure: Left triceps repair with Arthrex fiberwire  Therapy Diagnosis:        Encounter Diagnosis   Name Primary?    Pain in left elbow        Precautions: Standard- continuous brace wear, no hernández/pro, slower healing times due to RA and steroid use.      Physician: Kymberly Noriega PA via Dr. Yarbrough  Physician Orders: eval and treat- per protocol- begin gentle wean from elbow brace   Date of Return to MD: 01/03/2019 @ 100PM     Evaluation Date: 11/1/2018  Plan of Care Certification Period: 11/01/2018-12/30/2018     Visit #: / Visits authorized: 6/50  Insurance Authorization period Expiration: 12/31/2018    Time In:958AM  Time Out: 1035AM   Total Billable Time: 25 minutes   Charges for this Visit: ILYA BERRY     Subjective     Pt reports: "It is feel good- I've been taking the brace off at home but wearing it when I was out"     she was compliant with home exercise program given last session.   Response to previous treatment:slight achiness reported  Functional change: none    Pain: 1/10  Location: left elbow- achy feeling    Objective   Observation/Appearance: Pt. Presents with hinged elbow brace at 30-90 degrees unlocked. - Unlocked per protocol today to      Elbow and Wrist ROM. Measured in degrees.    11/1/2018 11/1/2018 11/13/2018 12/4/2018     Right Left* Left Left     AROM AROM AROM AROM   Elbow Ext/Flex 0/153 30-90 in brace 25-90 18/130   Supination/Pronation 55/90 45/80 70/80 60/90   Wrist Ext/Flex WNL 55/30 55/30 45/40   Wrist RD/UD WNL 15/15 15/15 15/25             Strength (Dynamometer) and Pinch Strength (Pinch Gauge)  Measured in pounds.    11/1/2018 11/1/2018     Left Right   Rung II Deferred due to protocol " Deferred due to protocol   Key Pinch       3pt Pinch       2pt Pinch          Sensation: Intact per report     CMS Impairment/Limitation/Restriction for FOTO Survey  Therapist reviewed FOTO scores for Joyce Peterson.  FOTO documents entered into [employer] - see Media section.     Intake Limitation Score: 72% - 11/1/2018  6th visit Limitation Score: 65%-12/11/2018  Category: Self-Care     Current : CL= at least 60% but < 80% impaired, limited or restricted  Goal: CK = at least 40% but < 60% impaired, limited or restricted  Discharge: TBD                 Joyce received the following supervised modalities after being cleared for contradictions for 10 minutes:   -Patient received MH to left elbow to increase blood flow, circulation and tissue elasticity prior to therex        Joyce received the following direct contact modalities after being cleared for contraindications for 0 minutes:  -none today     Joyce received the following manual therapy techniques for 10 minutes:   -Performed RM to FA  to decrease edema, improve joint mobility, decrease pain and improve lymphatic drainage to increase hand function.   -Performed scar massage to  Incision to decrease adhesions and improve tensile glide.          Joyce received therapeutic exercises for 20 minutes including:  -  AROM gentle without brace: Flexion and small submaximal extension  x 3/10    AROM   Sup/Pro  wrist flex/ext, RD/UD gentle.  X 3/10 reps   Shoulder    scapula squeezes  Shoulder rolls       x 3/10 reps    Wrist PRE X 3/10 x 1# weightx 3 ways    Elbow flexion  X 3/10 x 1# weight  Bicep curl    Pink sponge squeeze  X 3 min    green ball walks  2/15                       Home Exercises and Education Provided     Education provided: Tendon tissue healing, continued brace wear, locking at 90 for sleep and between exercises. No weightbearing. Pt. Verbalized understanding.   - Progress towards goals     Written Home Exercises Provided: Patient instructed to  cont prior HEP.  Exercises were reviewed and Joyce was able to demonstrate them prior to the end of the session.  Joyce demonstrated good  understanding of the education provided.   .   See EMR under Patient Instructions for exercises provided prior visit.     Assessment     Joyce Peterson is a 58 y.o. female referred to outpatient occupational/hand therapy and presents with a medical diagnosis of Left triceps tear s/p repair 8 weeks.She reports some tenderness in the elbow at the distal end of the incision but otherwise no pain.  Added submaximal elbow extension this date, and light PRE for wrist, elbow flexion, shoulder flexion, and gripping. Pt. Without complaints following upgraded session. Joyce is progressing well towards her goals and there are no updates to goals at this time. Pt prognosis continues as Excellent. Pt will continue to benefit from skilled outpatient occupational therapy to address the deficits listed in the problem list on initial evaluation, provide pt/family education and to maximize pt's level of independence in the home and community environment.     Anticipated barriers to continued occupational therapy: none    Pt's spiritual, cultural and educational needs considered and pt agreeable to plan of care and goals.    Goals     Long Term Goals (LTGs); to be met by discharge.  LTG #1: Pt will report a pain level of 0 out of 10 at rest.  LTG #2: Pt will demo improved FOTO score by 19 points.   LTG #3: Pt will return to prior level of function for ADLs and household management.      Short Term Goals (STGs); to be met within 6 weeks (12/15/2018).  STG #1a: Pt will report 2 out of 10 pain level at rest.  STG #2b: Pt will demonstrate independence with issued HEP.   STG #3b: Pt will demo improved elbow ROM to WFL (  degrees Actively). MET    Plan     Continue skilled occupational therapy with individualized plan of care 2x/week for 8 weeks from 11/1/2018 to 12/31/2018  Pt. To come 1xwk  until able to progress per protocol- start 2xwk after 8 weeks    Discussed Plan of Care with patient: Yes  Updates/Grading for next session: cont to progress per protocol., start gentle strengthening at 8 weeks post op.     Clare Cruz OTD, OTR/L, CHT   Occupational therapist, Certified Hand Therapist

## 2018-12-13 ENCOUNTER — CLINICAL SUPPORT (OUTPATIENT)
Dept: REHABILITATION | Facility: HOSPITAL | Age: 58
End: 2018-12-13
Payer: MEDICARE

## 2018-12-13 DIAGNOSIS — M25.522 PAIN IN LEFT ELBOW: ICD-10-CM

## 2018-12-13 PROCEDURE — 97110 THERAPEUTIC EXERCISES: CPT | Mod: PN

## 2018-12-13 PROCEDURE — 97140 MANUAL THERAPY 1/> REGIONS: CPT | Mod: PN

## 2018-12-13 NOTE — PROGRESS NOTES
"  Occupational Therapy Daily Treatment Note      Date: 12/13/2018  Name: Joyce Peterson  Clinic Number: [id number]  Name: Joyce Peterson  Clinic Number: [id number]     Medical Diagnosis: Left triceps rupture s/p repair   Date of Onset:~ Sept 20th  Date of Surgery: 10/17/2018  Surgical Procedure: Left triceps repair with Arthrex fiberwire  Therapy Diagnosis:        Encounter Diagnosis   Name Primary?    Pain in left elbow        Precautions: Standard- continuous brace wear, no hernández/pro, slower healing times due to RA and steroid use.      Physician: Kymberly Noriega PA via Dr. Yarbrough  Physician Orders: eval and treat- per protocol- begin gentle wean from elbow brace   Date of Return to MD: 01/03/2019 @ 100PM     Evaluation Date: 11/1/2018  Plan of Care Certification Period: 11/01/2018-12/30/2018     Visit #: / Visits authorized: 7/50  Insurance Authorization period Expiration: 12/31/2018    Time In:10AM  Time Out: 1040AM   Total Billable Time: 25 minutes   Charges for this Visit: MT, TE     Subjective     Pt reports: " I left my brace off because I'm going to a [employer] lunch and I didn't want to wear it"  Educated pt on importance of brace wear to protect repair- pt verbalized understanding.  Pt. Also states she feels more painful overall right now because she hasn't been able to take her arthritis medicine while she is on antibiotics.   she was compliant with home exercise program given last session.   Response to previous treatment: no complaints following increased exercises last session  Functional change: increased ability to dress self    Pain: 1/10  Location: left elbow- achy feeling    Objective        Elbow and Wrist ROM. Measured in degrees.    11/1/2018 11/1/2018 11/13/2018 12/4/2018     Right Left* Left Left     AROM AROM AROM AROM   Elbow Ext/Flex 0/153 30-90 in brace 25-90 18/130   Supination/Pronation 55/90 45/80 70/80 60/90   Wrist Ext/Flex WNL 55/30 55/30 45/40   Wrist RD/UD WNL 15/15 15/15 " 15/25             Strength (Dynamometer) and Pinch Strength (Pinch Gauge)  Measured in pounds.    11/1/2018 11/1/2018     Left Right   Rung II Deferred due to protocol Deferred due to protocol   Key Pinch       3pt Pinch       2pt Pinch          Sensation: Intact per report     CMS Impairment/Limitation/Restriction for FOTO Survey  Therapist reviewed FOTO scores for Joyce Peterson.  FOTO documents entered into EPIC - see Media section.     Intake Limitation Score: 72% - 11/1/2018  6th visit Limitation Score: 65%-12/11/2018  Category: Self-Care     Current : CL= at least 60% but < 80% impaired, limited or restricted  Goal: CK = at least 40% but < 60% impaired, limited or restricted  Discharge: TBD                 Joyce received the following supervised modalities after being cleared for contradictions for 10 minutes:   -Patient received MH to left elbow to increase blood flow, circulation and tissue elasticity prior to therex        Joyce received the following manual therapy techniques for 10 minutes:   -Performed RM to FA  to decrease edema, improve joint mobility, decrease pain and improve lymphatic drainage to increase hand function.   -Performed scar massage to  Incision to decrease adhesions and improve tensile glide.          Joyce received therapeutic exercises for 20 minutes including:  -  AROM gentle without brace: Flexion and small submaximal extension  x 3/10    AROM   Sup/Pro  wrist flex/ext, RD/UD gentle.  X 3/10 reps   Shoulder    scapula squeezes  Shoulder rolls       x 3/10 reps    Wrist PRE X 3/10 x 1# weightx 3 ways    Elbow flexion  X 3/10 x 1# weight  Bicep curl    Pink sponge squeeze  X 3 min    green ball walks  2/15                       Home Exercises and Education Provided     Education provided: Tendon tissue healing, continued brace wear, locking at 90 for sleep and between exercises. No weightbearing. Pt. Verbalized understanding.   - Progress towards goals     Written Home  Exercises Provided: Patient instructed to cont prior HEP.  Exercises were reviewed and Joyce was able to demonstrate them prior to the end of the session.  Joyce demonstrated good  understanding of the education provided.   .   See EMR under Patient Instructions for exercises provided prior visit.     Assessment     Joyce Peterson is a 58 y.o. female referred to outpatient occupational/hand therapy and presents with a medical diagnosis of Left triceps tear s/p repair 8 weeks.She reports some tenderness in the elbow at the distal end of the incision but otherwise no pain.  Cont submaximal elbow extension this date, and light PRE for wrist, elbow flexion, shoulder flexion, and gripping. Pt. Without complaints following today's session.     Joyce is progressing well towards her goals and there are no updates to goals at this time. Pt prognosis continues as Excellent. Pt will continue to benefit from skilled outpatient occupational therapy to address the deficits listed in the problem list on initial evaluation, provide pt/family education and to maximize pt's level of independence in the home and community environment.     Anticipated barriers to continued occupational therapy: none    Pt's spiritual, cultural and educational needs considered and pt agreeable to plan of care and goals.    Goals     Long Term Goals (LTGs); to be met by discharge.  LTG #1: Pt will report a pain level of 0 out of 10 at rest.  LTG #2: Pt will demo improved FOTO score by 19 points.   LTG #3: Pt will return to prior level of function for ADLs and household management.      Short Term Goals (STGs); to be met within 6 weeks (12/15/2018).  STG #1a: Pt will report 2 out of 10 pain level at rest.  STG #2b: Pt will demonstrate independence with issued HEP.   STG #3b: Pt will demo improved elbow ROM to WFL (  degrees Actively). MET    Plan     Continue skilled occupational therapy with individualized plan of care 2x/week for 8 weeks  from 11/1/2018 to 12/31/2018  Pt. To come 1xwk until able to progress per protocol- start 2xwk after 8 weeks    Discussed Plan of Care with patient: Yes  Updates/Grading for next session: cont to progress per protocol., start gentle strengthening at 8 weeks post op.     Clare Cruz OTGRACIELA, OTR/L, CHT   Occupational therapist, Certified Hand Therapist

## 2018-12-18 ENCOUNTER — CLINICAL SUPPORT (OUTPATIENT)
Dept: REHABILITATION | Facility: HOSPITAL | Age: 58
End: 2018-12-18
Payer: MEDICARE

## 2018-12-18 DIAGNOSIS — M25.522 PAIN IN LEFT ELBOW: ICD-10-CM

## 2018-12-18 PROCEDURE — 97140 MANUAL THERAPY 1/> REGIONS: CPT | Mod: PN

## 2018-12-18 PROCEDURE — 97110 THERAPEUTIC EXERCISES: CPT | Mod: PN

## 2018-12-18 NOTE — PROGRESS NOTES
"  Occupational Therapy Daily Treatment Note      Date: 12/18/2018  Name: Joyce Peterson  Clinic Number: [id number]  Name: Joyce Peterson  Clinic Number: [id number]     Medical Diagnosis: Left triceps rupture s/p repair   Date of Onset:~ Sept 20th  Date of Surgery: 10/17/2018  Surgical Procedure: Left triceps repair with Arthrex fiberwire  Therapy Diagnosis:        Encounter Diagnosis   Name Primary?    Pain in left elbow        Precautions: Standard- continuous brace wear, no hernández/pro, slower healing times due to RA and steroid use.      Physician: Kymberly Noriega PA via Dr. Yarbrough  Physician Orders: eval and treat- per protocol- begin gentle wean from elbow brace   Date of Return to MD: 01/03/2019 @ 100PM     Evaluation Date: 11/1/2018  Plan of Care Certification Period: 11/01/2018-12/30/2018     Visit #: / Visits authorized: 8/50  Insurance Authorization period Expiration: 12/31/2018    Time In:10AM  Time Out: 1050AM   Total Billable Time: 40 minutes   Charges for this Visit: MT, TE x2    Subjective     Pt reports: " I left my brace off because I'm going to a [employer] lunch and I didn't want to wear it"  Educated pt on importance of brace wear to protect repair- pt verbalized understanding.  Pt. Also states she feels more painful overall right now because she hasn't been able to take her arthritis medicine while she is on antibiotics.   she was compliant with home exercise program given last session.   Response to previous treatment: no complaints following increased exercises last session  Functional change: increased ability to dress self    Pain: 1/10  Location: left elbow- achy feeling    Objective        Elbow and Wrist ROM. Measured in degrees.    11/1/2018 11/1/2018 11/13/2018 12/4/2018     Right Left* Left Left     AROM AROM AROM AROM   Elbow Ext/Flex 0/153 30-90 in brace 25-90 18/130   Supination/Pronation 55/90 45/80 70/80 60/90   Wrist Ext/Flex WNL 55/30 55/30 45/40   Wrist RD/UD WNL 15/15 15/15 " 15/25             Strength (Dynamometer) and Pinch Strength (Pinch Gauge)  Measured in pounds.    11/1/2018 11/1/2018     Left Right   Rung II Deferred due to protocol Deferred due to protocol   Key Pinch       3pt Pinch       2pt Pinch          Sensation: Intact per report     CMS Impairment/Limitation/Restriction for FOTO Survey  Therapist reviewed FOTO scores for Joyce Peterson.  FOTO documents entered into EPIC - see Media section.     Intake Limitation Score: 72% - 11/1/2018  6th visit Limitation Score: 65%-12/11/2018  Category: Self-Care     Current : CL= at least 60% but < 80% impaired, limited or restricted  Goal: CK = at least 40% but < 60% impaired, limited or restricted  Discharge: TBD                 Joyce received the following supervised modalities after being cleared for contradictions for 10 minutes:   -Patient received MH to left elbow to increase blood flow, circulation and tissue elasticity prior to therex        Joyce received the following manual therapy techniques for 10 minutes:   -Performed RM to FA  to decrease edema, improve joint mobility, decrease pain and improve lymphatic drainage to increase hand function.   -Performed scar massage to  Incision to decrease adhesions and improve tensile glide.          Joyce received therapeutic exercises for 20 minutes including:  -  AROM gentle without brace: Flexion and small submaximal extension  x 3/10    AROM   Sup/Pro  wrist flex/ext, RD/UD gentle.  X 3/10 reps   Shoulder    scapula squeezes  Shoulder rolls       x 3/10 reps    Wrist PRE X 3/10 x 1# weightx 3 ways    Elbow flexion  X 3/10 x 1# weight  Bicep curl    Pink sponge squeeze  X 3 min    green ball walks  2/15                       Home Exercises and Education Provided     Education provided: Tendon tissue healing, continued brace wear, locking at 90 for sleep and between exercises. No weightbearing. Pt. Verbalized understanding.   - Progress towards goals     Written Home  Exercises Provided: Patient instructed to cont prior HEP.  Exercises were reviewed and Joyce was able to demonstrate them prior to the end of the session.  Joyce demonstrated good  understanding of the education provided.   .   See EMR under Patient Instructions for exercises provided prior visit.     Assessment     Joyce Peterson is a 58 y.o. female referred to outpatient occupational/hand therapy and presents with a medical diagnosis of Left triceps tear s/p repair 8.5 weeks.She reports some tenderness in the elbow at the distal end of the incision but otherwise no pain.  Cont submaximal elbow extension this date, and light PRE for wrist, elbow flexion, shoulder flexion, and gripping. Pt. Without complaints following today's session.     Joyce is progressing well towards her goals and there are no updates to goals at this time. Pt prognosis continues as Excellent. Pt will continue to benefit from skilled outpatient occupational therapy to address the deficits listed in the problem list on initial evaluation, provide pt/family education and to maximize pt's level of independence in the home and community environment.     Anticipated barriers to continued occupational therapy: none    Pt's spiritual, cultural and educational needs considered and pt agreeable to plan of care and goals.    Goals     Long Term Goals (LTGs); to be met by discharge.  LTG #1: Pt will report a pain level of 0 out of 10 at rest.  LTG #2: Pt will demo improved FOTO score by 19 points.   LTG #3: Pt will return to prior level of function for ADLs and household management.      Short Term Goals (STGs); to be met within 6 weeks (12/15/2018).  STG #1a: Pt will report 2 out of 10 pain level at rest.  STG #2b: Pt will demonstrate independence with issued HEP.   STG #3b: Pt will demo improved elbow ROM to WFL (  degrees Actively). MET    Plan     Continue skilled occupational therapy with individualized plan of care 2x/week for 8 weeks  from 11/1/2018 to 12/31/2018  Pt. To come 1xwk until able to progress per protocol- start 2xwk after 8 weeks    Discussed Plan of Care with patient: Yes  Updates/Grading for next session: cont to progress per protocol., start gentle strengthening at 8 weeks post op.     Clare Cruz OTGRACIELA, OTR/L, CHT   Occupational therapist, Certified Hand Therapist

## 2018-12-26 RX ORDER — TOFACITINIB 11 MG/1
TABLET, FILM COATED, EXTENDED RELEASE ORAL
Qty: 90 TABLET | Refills: 0 | Status: SHIPPED | OUTPATIENT
Start: 2018-12-26 | End: 2019-01-14

## 2019-01-02 ENCOUNTER — PATIENT MESSAGE (OUTPATIENT)
Dept: RHEUMATOLOGY | Facility: CLINIC | Age: 59
End: 2019-01-02

## 2019-01-03 ENCOUNTER — OFFICE VISIT (OUTPATIENT)
Dept: ORTHOPEDICS | Facility: CLINIC | Age: 59
End: 2019-01-03
Payer: MEDICARE

## 2019-01-03 VITALS
HEART RATE: 90 BPM | BODY MASS INDEX: 26.29 KG/M2 | WEIGHT: 154 LBS | SYSTOLIC BLOOD PRESSURE: 116 MMHG | DIASTOLIC BLOOD PRESSURE: 80 MMHG | HEIGHT: 64 IN

## 2019-01-03 DIAGNOSIS — Z47.89 ORTHOPEDIC AFTERCARE: ICD-10-CM

## 2019-01-03 DIAGNOSIS — S46.312A RUPTURE OF LEFT TRICEPS TENDON, INITIAL ENCOUNTER: Primary | ICD-10-CM

## 2019-01-03 PROCEDURE — 99999 PR PBB SHADOW E&M-EST. PATIENT-LVL V: ICD-10-PCS | Mod: PBBFAC,,, | Performed by: PHYSICIAN ASSISTANT

## 2019-01-03 PROCEDURE — 99024 POSTOP FOLLOW-UP VISIT: CPT | Mod: S$GLB,,, | Performed by: PHYSICIAN ASSISTANT

## 2019-01-03 PROCEDURE — 99024 PR POST-OP FOLLOW-UP VISIT: ICD-10-PCS | Mod: S$GLB,,, | Performed by: PHYSICIAN ASSISTANT

## 2019-01-03 PROCEDURE — 99999 PR PBB SHADOW E&M-EST. PATIENT-LVL V: CPT | Mod: PBBFAC,,, | Performed by: PHYSICIAN ASSISTANT

## 2019-01-03 NOTE — PROGRESS NOTES
"Ms. Peterson is here today for a post-operative visit.  She is 78 days status post Primary repair of left triceps tendon by Dr. Yarbrough on 10/17/18. She reports that she is doing well.  Pain is mild, 2/10.  She has attended a few OT sessions, she is in the brace for public only. She reports that her elbow motion is improved, she does have some pain at the medial epicondyle.  She reports that she has occasionally "lifted the groceries" with the left, but "then put them down because it was too much."  She denies fever, chills, and sweats since the time of the surgery.     Physical exam:    Vitals:    01/03/19 1001   BP: 116/80   Pulse: 90   Weight: 69.9 kg (154 lb)   Height: 5' 3.6" (1.615 m)   PainSc:   2     Vital signs are stable, patient is afebrile.  Patient is well dressed and well groomed, no acute distress.  Alert and oriented to person, place, and time.  Incision is healing well - clean, dry and intact. Mildly tender over the medial epicondyle. There is no erythema or exudate.  There is no sign of any infection. She is NVI.  Good finger and wrist ROM. Good elbow ROM, mild decrease in extension and supination.    Assessment: 78 days status post Primary repair of left triceps tendon    Plan:  Joyce was seen today for post-op evaluation.    Diagnoses and all orders for this visit:    Rupture of left triceps tendon, initial encounter    Orthopedic aftercare        - Continue OT and HEP  - Gradual strengthening  - Call with questions or concerns  - Follow up in 3 months if needed    "

## 2019-01-06 ENCOUNTER — PATIENT MESSAGE (OUTPATIENT)
Dept: ALLERGY | Facility: CLINIC | Age: 59
End: 2019-01-06

## 2019-01-07 ENCOUNTER — PATIENT MESSAGE (OUTPATIENT)
Dept: ALLERGY | Facility: CLINIC | Age: 59
End: 2019-01-07

## 2019-01-07 ENCOUNTER — OFFICE VISIT (OUTPATIENT)
Dept: PODIATRY | Facility: CLINIC | Age: 59
End: 2019-01-07
Payer: MEDICARE

## 2019-01-07 VITALS — WEIGHT: 154 LBS | HEIGHT: 63 IN | BODY MASS INDEX: 27.29 KG/M2

## 2019-01-07 DIAGNOSIS — M21.622 TAILOR'S BUNION OF BOTH FEET: ICD-10-CM

## 2019-01-07 DIAGNOSIS — M06.9 RHEUMATOID ARTHRITIS INVOLVING MULTIPLE SITES, UNSPECIFIED RHEUMATOID FACTOR PRESENCE: ICD-10-CM

## 2019-01-07 DIAGNOSIS — M21.619 BUNION: Primary | ICD-10-CM

## 2019-01-07 DIAGNOSIS — M21.621 TAILOR'S BUNION OF BOTH FEET: ICD-10-CM

## 2019-01-07 PROCEDURE — 99999 PR PBB SHADOW E&M-EST. PATIENT-LVL IV: CPT | Mod: PBBFAC,,, | Performed by: PODIATRIST

## 2019-01-07 PROCEDURE — 99214 PR OFFICE/OUTPT VISIT, EST, LEVL IV, 30-39 MIN: ICD-10-PCS | Mod: S$GLB,,, | Performed by: PODIATRIST

## 2019-01-07 PROCEDURE — 3008F BODY MASS INDEX DOCD: CPT | Mod: CPTII,S$GLB,, | Performed by: PODIATRIST

## 2019-01-07 PROCEDURE — 99999 PR PBB SHADOW E&M-EST. PATIENT-LVL IV: ICD-10-PCS | Mod: PBBFAC,,, | Performed by: PODIATRIST

## 2019-01-07 PROCEDURE — 99214 OFFICE O/P EST MOD 30 MIN: CPT | Mod: S$GLB,,, | Performed by: PODIATRIST

## 2019-01-07 PROCEDURE — 3008F PR BODY MASS INDEX (BMI) DOCUMENTED: ICD-10-PCS | Mod: CPTII,S$GLB,, | Performed by: PODIATRIST

## 2019-01-07 RX ORDER — DICLOFENAC SODIUM 10 MG/G
2 GEL TOPICAL 4 TIMES DAILY PRN
Qty: 500 G | Refills: 5 | Status: SHIPPED | OUTPATIENT
Start: 2019-01-07 | End: 2020-02-03

## 2019-01-07 NOTE — PROGRESS NOTES
Chief Complaint   Patient presents with    Callouses     Left 5th digit    Foot Pain     Right foot          HPI:       Joyce Peterson is a 58 y.o. female who presents to clinic for bilateral foot pain, due to bunions and tailors bunion, progressively getting worse.   She is unable to wear most shoes comfortably due to pressure and irritation.    She has history of Rheumatoid Arthritis and osteoporosis.   She has stopped methotrexate.   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   She is on Prednisone.          Past Medical History:   Diagnosis Date    Acid reflux     Allergy     Anemia     Asthma     Coronary artery disease     Degenerative disc disease     Dry eyes     Dry mouth     Gastroparesis     Hyperlipidemia     Lateral meniscus derangement 4/6/2016    Lobular carcinoma in situ     Osteoarthritis     Osteoporosis     Rheumatoid arthritis(714.0)     Umbilical hernia 8/13/2015             Current Outpatient Medications on File Prior to Visit   Medication Sig Dispense Refill    aspirin 81 mg Tab Take 81 mg by mouth every morning.       azelastine (ASTELIN) 137 mcg nasal spray 1 spray (137 mcg total) by Nasal route 2 (two) times daily. (Patient taking differently: 1 spray by Nasal route 2 (two) times daily as needed. ) 30 mL 11    budesonide-formoterol 160-4.5 mcg (SYMBICORT) 160-4.5 mcg/actuation HFAA Inhale 2 puffs into the lungs every 12 (twelve) hours. 3 Inhaler 3    calcium citrate-vitamin D (CITRACAL + D) 315-200 mg-unit per tablet Take 1 tablet by mouth 2 (two) times daily.       docusate sodium (COLACE) 100 MG capsule Take 1 capsule (100 mg total) by mouth 3 (three) times daily. 90 capsule 0    fish oil-omega-3 fatty acids 300-1,000 mg capsule Take 1,400 g by mouth once daily.      flaxseed oil 1,000 mg Cap Take by mouth once daily.      flu vac [id number] 36mos up,PF, 60 mcg (15 mcg x 4)/0.5 mL Syrg To be administered by Pharm D 0.5  "mL 0    fluticasone (FLONASE) 50 mcg/actuation nasal spray       folic acid (FOLVITE) 1 MG tablet TAKE 1 TABLET BY MOUTH EVERY DAY 90 tablet 3    GUAIFENESIN (MUCINEX ORAL) Take 400 mg by mouth every 4 (four) hours as needed.       HYDROcodone-acetaminophen (NORCO)  mg per tablet Take 1 tablet by mouth every 4 (four) hours as needed for Pain. Patient is in the immediate postoperative period. 47 tablet 0    INV PROPULSID 10 MG Take 20 mg by mouth 4 (four) times daily. FOR INVESTIGATIONAL USE ONLY. Protocol [id number] each 0    leflunomide (ARAVA) 20 MG Tab TAKE 1 TABLET (20 MG TOTAL) BY MOUTH ONCE DAILY. 90 tablet 0    montelukast (SINGULAIR) 10 mg tablet Take 1 tablet (10 mg total) by mouth nightly. 90 tablet 3    naproxen (NAPROSYN) 500 MG tablet TAKE 1 TABLET TWICE A DAY AS NEEDED 180 tablet 1    omeprazole (PRILOSEC) 40 MG capsule TAKE ONE CAPSULE BY MOUTH EVERY MORNING 30 capsule 6    omeprazole-sodium bicarbonate (ZEGERID) 40-1.1 mg-gram per capsule TAKE 1 CAPSULE BY MOUTH EVERY MORNING. 90 capsule 3    polyethylene glycol (GLYCOLAX) 17 gram/dose powder Mix 1 capful (17 grams total) with liquid and take by mouth once daily. 1581 g 0    POTASSIUM ORAL Take by mouth once daily.      PREMARIN 0.625 mg tablet Take 0.625 mg by mouth once daily.  4    progesterone (PROMETRIUM) 100 MG capsule Take 100 mg by mouth every morning. 1 Capsule Oral Every day, morning      promethazine (PHENERGAN) 25 MG tablet Take 1 tablet (25 mg total) by mouth every tthree times daily. 50 tablet 0    rizatriptan (MAXALT) 10 MG tablet Take 10 mg by mouth as needed for Migraine.       rosuvastatin (CRESTOR) 20 MG tablet TAKE 1 TABLET EVERY DAY (Patient taking differently: TAKE 1 TABLET EVERY evening) 90 tablet 3    sucralfate (CARAFATE) 1 gram tablet TAKE 1 TABLET (1 G TOTAL) BY MOUTH 4 (FOUR) TIMES DAILY. 360 tablet 3    syringe with needle (TUBERCULIN SYRINGE) 1 mL 27 x 1/2" Syrg To administer methotrexate " "7.5mg sc once a week 12 Syringe 3    traMADol (ULTRAM) 50 mg tablet Take 1 tablet (50 mg total) by mouth every 6 (six) hours. 28 tablet 0    trospium (SANCTURA) 20 mg Tab tablet Take 1 tablet (20 mg total) by mouth 2 (two) times daily. 60 tablet 11    valACYclovir (VALTREX) 1000 MG tablet TAKE 1 TABLET BY MOUTH TWICE A DAY AS NEEDED 20 tablet 3    XELJANZ XR 11 mg Tb24 TAKE ONE TABLET (11 MG) BY MOUTH ONCE DAILY. MAY BE TAKEN WITH OR WITHOUT FOOD. SWALLOW TABLET WHOLE. DO NOT CRUSH, SPLIT OR CHEW. STORE AT 90 tablet 0    RESTASIS 0.05 % ophthalmic emulsion Place 0.4 mLs (1 drop total) into both eyes 2 (two) times daily. 60 vial 5     Current Facility-Administered Medications on File Prior to Visit   Medication Dose Route Frequency Provider Last Rate Last Dose    0.9%  NaCl infusion   Intravenous Continuous Callum Singer MD   Stopped at 10/17/18 1058           Review of patient's allergies indicates:   Allergen Reactions    Actemra [tocilizumab] Other (See Comments)     Severe dizziness    Codeine Nausea And Vomiting    Gold au 198 Hives and Rash    Hydroxychloroquine Other (See Comments)     Can't remember the reaction      Iodinated contrast media - oral and iv dye Other (See Comments)     Other reaction(s): BURNING ALL OVER    Iodine Other (See Comments)     Other reaction(s): BURNING ALL OVER - Iodine dye - Not topical    Sulfa (sulfonamide antibiotics) Other (See Comments)     Can't remember the reaction    Zofran [ondansetron hcl (pf)] Nausea And Vomiting     Pt reports that last time she received zofran she started vomiting again    Methotrexate analogues Nausea Only    Pneumovax 23 [pneumococcal 23-argenis ps vaccine] Other (See Comments)     "sick"           Social History     Socioeconomic History    Marital status:      Spouse name: Not on file    Number of children: Not on file    Years of education: Not on file    Highest education level: Not on file   Social Needs    " "Financial resource strain: Not on file    Food insecurity - worry: Not on file    Food insecurity - inability: Not on file    Transportation needs - medical: Not on file    Transportation needs - non-medical: Not on file   Occupational History    Not on file   Tobacco Use    Smoking status: Never Smoker    Smokeless tobacco: Never Used   Substance and Sexual Activity    Alcohol use: Yes     Comment: occasionally    Drug use: No    Sexual activity: Yes     Partners: Male     Birth control/protection: Surgical   Other Topics Concern    Are you pregnant or think you may be? Not Asked    Breast-feeding Not Asked   Social History Narrative    Not on file           ROS:  General ROS: negative for  chills, fatigue or fever  Cardiovascular ROS: no chest pain or dyspnea on exertion  Musculoskeletal ROS: negative for joint pain or joint stiffness.  Negative for loss of strength.  Positive for foot pain.   Neuro ROS: Negative for syncope, numbness, or muscle weakness  Skin ROS: Negative for rash, itching or nail/hair changes.           OBJECTIVE:         Vitals:    01/07/19 1437   Weight: 69.9 kg (154 lb)   Height: 5' 3" (1.6 m)        Bilateral  Lower extremity exam:    Vasc: Palpable pedal pulses. Feet appropriately warm to touch. Cap refill time is within normal limits.  1+ chronic edema.     Neurological:  Light touch, proprioception, and Sharp/dull sensation are all intact. There is no Tinel's along the tarsal tunnel.    Negative genesis's.  No sensorimotor deficitis.     Derm: No open lesions, macerations, or rashes.  No bruising.  No open wounds.   Redness over the bilateral bunions and tailor's bunions    MSK:  No tenderness to palpation near the 4th or 5th metatarsal shaft;   bilateral bunions and tailors bunion with redness 2/2 shoe irritation.  Limited 1st metatarso-phalangeal joint range of motion.   bilateral adductovarus 5th toes.               ASSESSMENT/PLAN:        Bunion  -     diclofenac sodium " (VOLTAREN) 1 % Gel; Apply 2 g topically 4 (four) times daily as needed.  Dispense: 500 g; Refill: 5    Rheumatoid arthritis involving multiple sites, unspecified rheumatoid factor presence  -     diclofenac sodium (VOLTAREN) 1 % Gel; Apply 2 g topically 4 (four) times daily as needed.  Dispense: 500 g; Refill: 5    Tailor's bunion of both feet  -     diclofenac sodium (VOLTAREN) 1 % Gel; Apply 2 g topically 4 (four) times daily as needed.  Dispense: 500 g; Refill: 5         · Never walk barefoot.  Avoid thin sandals.  Shoe and activity modification as needed for relief.  · May consider exostectomy of bony prominence but must be cautious due to history of pathological fractures.   · Monitor for worsening signs and symptoms.    · See orders above    Call or return to clinic prn if these symptoms worsen or fail to improve as anticipated.

## 2019-01-08 RX ORDER — ALBUTEROL SULFATE 108 UG/1
AEROSOL, METERED RESPIRATORY (INHALATION)
Qty: 20.1 INHALER | Refills: 0 | Status: SHIPPED | OUTPATIENT
Start: 2019-01-08 | End: 2019-08-12 | Stop reason: SDUPTHER

## 2019-01-09 NOTE — PATIENT INSTRUCTIONS
Wearing Proper Shoes                    You walk on your feet every day, forcing them to support the weight of your body. Repeated stress on your feet can cause damage over time. The right shoes can help protect your feet. The wrong shoes can cause more foot problems. Read the information below to help you find a shoe that fits your foot needs.      A good shoe fit will cover your foot outline. A shoe that doesnt cover the outline is a bad fit.   Whats your foot shape?  To get a good fit, you need to know the shape of your foot. Do this simple test: While standing, place your foot on a piece of paper and trace around it. Is your foot straight or curved? Do you have a foot problem, such as a bunion, that causes your foot outline to show a bulge on the side of your big toe?  Finding your fit  Bring your foot outline to the shoe store to help you find the right shoe. Place a shoe you like on top of the outline to see if it matches the shape. The shoe should cover the outline. (If you have a bunion, the shoe may not cover the bulge on the outline. Look for soft leather shoes to stretch over the bunion.) Once youve found a pair of proper shoes, put them on. Walk around. Be sure the shoes dont rub or pinch. If the shoes feel good, youve found your fit!  The right shoe for you  A good shoe has features that provide comfort and support. It must also be the right size and shape for your feet. Look for a shoe made of breathable fabric and lining, such as leather or canvas. Be sure that shoes have enough tread to prevent slipping. Go to a good shoe store for help finding the right shoe.  Good shoe features  An ideal shoe has the following:  · Laces for support. If tying laces is a problem for you, try shoes with Velcro fasteners or gabriela.  · A front of the shoe (toe box) with ½ inch space in front of your longest toes.  · An arch shape that supports your foot.  · No more than 1½ inches of heel.  · A stiff, snug back  of the shoe to keep your foot from sliding around.  · A smooth lining with no rough seams.  Shoe shopping tips  Below are some dos and donts for when you go to the shoe store.  Do:  · Select the shoes that feel right. Wear them around the house. Then bring them to your foot healthcare provider to check for fit. If they dont fit well, return them.  · Shop late in the day, when your feet will be slightly bigger.  · Each time you buy shoes, have both your feet measured while you are standing. Foot size changes with time.  · Pick shoes to suit their purpose. High heels are OK for an occasional night on the town. But for everyday wear, choose a more sensible shoe.  · Try on shoes while wearing any inserts specially made for your feet (orthoses).  · Try on both the right and left shoes. If your feet are different sizes, pick a pair that fits the larger foot.  Dont:  · Dont buy shoes based on shoe size alone. Always try on shoes, as sizes differ from brand to brand and within brands.  · Dont expect shoes to break in. If they dont fit at the store, dont buy them.  · Dont buy a shoe that doesnt match your foot shape.  What about socks?  Always wear socks with shoes. Socks help absorb sweat and reduce friction and blistering. When shopping for shoes, choose soft, padded socks with seams that dont irritate your feet.  If you have foot problems  Some foot problems cause deformities. This can make it hard to find a good fit. Look for shoes made of soft leather to stretch over the deformity. If you have bunions, buy shoes with a wider toe box. To fit hammertoes, look for shoes with a tall toe box. If you have arch problems, you may need inserts. In some cases, youll need to have custom footwear or orthoses made for your feet.  Suggested footwear  Ask your healthcare provider what kind of footwear you need. He or she may recommend a certain brand or shoe store.  Date Last Reviewed: 8/1/2016  © 1550-8473 [employer] [address] [place] [address], [place] [address]. All rights reserved. This information is not intended as a substitute for professional medical care. Always follow your healthcare professional's instructions.

## 2019-01-10 ENCOUNTER — OFFICE VISIT (OUTPATIENT)
Dept: ALLERGY | Facility: CLINIC | Age: 59
End: 2019-01-10
Attending: ALLERGY & IMMUNOLOGY
Payer: MEDICARE

## 2019-01-10 ENCOUNTER — CLINICAL SUPPORT (OUTPATIENT)
Dept: REHABILITATION | Facility: HOSPITAL | Age: 59
End: 2019-01-10
Payer: MEDICARE

## 2019-01-10 VITALS
HEIGHT: 61 IN | BODY MASS INDEX: 29.47 KG/M2 | WEIGHT: 156.06 LBS | SYSTOLIC BLOOD PRESSURE: 124 MMHG | DIASTOLIC BLOOD PRESSURE: 82 MMHG

## 2019-01-10 DIAGNOSIS — M25.522 PAIN IN LEFT ELBOW: ICD-10-CM

## 2019-01-10 DIAGNOSIS — J45.909 ASTHMA, UNSPECIFIED ASTHMA SEVERITY, UNSPECIFIED WHETHER COMPLICATED, UNSPECIFIED WHETHER PERSISTENT: ICD-10-CM

## 2019-01-10 DIAGNOSIS — J30.89 NON-SEASONAL ALLERGIC RHINITIS, UNSPECIFIED TRIGGER: ICD-10-CM

## 2019-01-10 DIAGNOSIS — M25.622 STIFFNESS OF LEFT ELBOW JOINT: ICD-10-CM

## 2019-01-10 DIAGNOSIS — R29.898 WEAKNESS OF LEFT ARM: ICD-10-CM

## 2019-01-10 DIAGNOSIS — J31.0 CHRONIC RHINITIS: Primary | ICD-10-CM

## 2019-01-10 DIAGNOSIS — J06.9 RECURRENT URI (UPPER RESPIRATORY INFECTION): ICD-10-CM

## 2019-01-10 DIAGNOSIS — R76.8 LOW SERUM IGG1 AND IGM LEVELS: ICD-10-CM

## 2019-01-10 PROCEDURE — 97110 THERAPEUTIC EXERCISES: CPT | Mod: PN

## 2019-01-10 PROCEDURE — 99204 PR OFFICE/OUTPT VISIT, NEW, LEVL IV, 45-59 MIN: ICD-10-PCS | Mod: S$GLB,,, | Performed by: ALLERGY & IMMUNOLOGY

## 2019-01-10 PROCEDURE — 99999 PR PBB SHADOW E&M-EST. PATIENT-LVL V: ICD-10-PCS | Mod: PBBFAC,,, | Performed by: ALLERGY & IMMUNOLOGY

## 2019-01-10 PROCEDURE — 3008F BODY MASS INDEX DOCD: CPT | Mod: CPTII,S$GLB,, | Performed by: ALLERGY & IMMUNOLOGY

## 2019-01-10 PROCEDURE — 99999 PR PBB SHADOW E&M-EST. PATIENT-LVL V: CPT | Mod: PBBFAC,,, | Performed by: ALLERGY & IMMUNOLOGY

## 2019-01-10 PROCEDURE — 99204 OFFICE O/P NEW MOD 45 MIN: CPT | Mod: S$GLB,,, | Performed by: ALLERGY & IMMUNOLOGY

## 2019-01-10 PROCEDURE — 3008F PR BODY MASS INDEX (BMI) DOCUMENTED: ICD-10-PCS | Mod: CPTII,S$GLB,, | Performed by: ALLERGY & IMMUNOLOGY

## 2019-01-10 PROCEDURE — 97140 MANUAL THERAPY 1/> REGIONS: CPT | Mod: PN

## 2019-01-10 RX ORDER — PREDNISONE 5 MG/1
5 TABLET ORAL DAILY
Refills: 0 | COMMUNITY
Start: 2018-11-20 | End: 2019-03-21 | Stop reason: SDUPTHER

## 2019-01-10 RX ORDER — CYCLOSPORINE 0.5 MG/ML
EMULSION OPHTHALMIC
Refills: 5 | COMMUNITY
Start: 2018-12-13 | End: 2020-05-04 | Stop reason: SDUPTHER

## 2019-01-10 RX ORDER — AZITHROMYCIN 250 MG/1
TABLET, FILM COATED ORAL
Qty: 6 TABLET | Refills: 0 | Status: SHIPPED | OUTPATIENT
Start: 2019-01-10 | End: 2019-04-11

## 2019-01-10 NOTE — PLAN OF CARE
"  Outpatient Therapy Updated Plan of Care     Name: Joyce Peterson  Clinic Number: [id number]  Name: Joyce Peterson  Clinic Number: [id number]     Medical Diagnosis: Left triceps rupture s/p repair   Date of Onset:~ Sept 20th  Date of Surgery: 10/17/2018  Surgical Procedure: Left triceps repair with Arthrex fiberwire  Therapy Diagnosis:           Encounter Diagnosis   Name Primary?    Pain in left elbow        Precautions: Standard- , slower healing times due to RA and steroid use.      Physician: Kymberly Noriega PA via Dr. Yarbrough  Physician Orders:Cont therapy PROM, strengthening  Date of Return to MD: 01/03/2019 @ 100PM     Evaluation Date: 11/1/2018  Plan of Care Certification Period: 01/10/2019-02/22/2019     Visit #: / Visits authorized: 9/50  Insurance Authorization period Expiration: 12/31/2019    Subjective     Update:   Pt reports: " I still can't extend it all the way or flex it all the way without pain"        Objective     Update:Elbow and Wrist ROM. Measured in degrees.    11/1/2018 11/1/2018 11/13/2018 12/4/2018 01/10/2019     Right Left* Left Left Left     AROM AROM AROM AROM AROM   Elbow Ext/Flex 0/153 30-90 in brace 25-90 18/130 12/150 (+6/+20)   Supination/Pronation 55/90 45/80 70/80 60/90     Wrist Ext/Flex WNL 55/30 55/30 45/40     Wrist RD/UD WNL 15/15 15/15 15/25                               Assessment     Update: Joyce Peterson is a 58 y.o. female referred to outpatient occupational/hand therapy and presents with a medical diagnosis of Left triceps tear s/p repair 12 weeks.She reports concerns about being unable to achieve full elbow extension or flexion. Use of low load long progression stretch for elbow ext with heat prior exercises this date.       Previous Short Term Goals Status:   All MET  New Short Term Goals Status:   Updated goals- Pt. Will demo improved elbow AROM 0-153     Pt. Will report independence with carrying groceries and lifting pots and pans.    Long Term Goal " Status:   continue per initial plan of care.  Reasons for Recertification of Therapy:   Cont deficits in ROM and strength deterring full functional use    Plan     Updated Certification Period: 1/10/2019 to 02/22/2019  Recommended Treatment Plan: 2 times per week for 8 weeks: Manual Therapy, Moist Heat/ Ice, Neuromuscular Re-ed, Patient Education, Self Care, Therapeutic Activites, Therapeutic Exercise and Ultrasound      Clare Cruz, OT  1/10/2019      I CERTIFY THE NEED FOR THESE SERVICES FURNISHED UNDER THIS PLAN OF TREATMENT AND WHILE UNDER MY CARE    Physician's comments:        Physician's Signature: ___________________________________________________

## 2019-01-10 NOTE — PROGRESS NOTES
Subjective:       Patient ID: Joyce Peterson is a 58 y.o. female.    Chief Complaint:  Consult (low IgG and IgM); Nasal Congestion; Eye Drainage; Wheezing; and Sore Throat      HPI    Pt w hx rheumatoid arthritis referred for low IgG1 and low IgM.  Has had RA for decades and has been through multiple immunosuppressant treatment regiments over the years, often changing b/c of poor response. Currently on 7 mg prednisone daily, leflunomide, xeljanz. Recently stopped mtx.  Does report 3-4 sinus infections per year, treated w abx, over last several years. Denies hx sinus surgery.  Also reports hx of asthma and recurrent lower resp infections, usu bronchitis, as often as 2-4 episodes per year but this decreased significantly w improved asthma control. Currently on routine symbicort 160, 1 puff bid. Rare need albuterol. Follows w pulmonary  Report hx of allergic rhinitis, followed by Dr. Marlow, last 10-15 years ago. Reports having had 3 separate courses of immunotherapy, approx 2 years duration. Allergen immunotherapy did not seem helpful to her.  She has difficulty controlling allergic rhinitis sx's b/c she does not tolerate antihistamines--even small doses aggravate her dry eyes. She does often take oral decongestants otc and is on routine montelukast        Past Medical History:   Diagnosis Date    Acid reflux     Allergy     Anemia     Asthma     Coronary artery disease     Degenerative disc disease     Dry eyes     Dry mouth     Gastroparesis     Hyperlipidemia     Lateral meniscus derangement 4/6/2016    Lobular carcinoma in situ     Osteoarthritis     Osteoporosis     Rheumatoid arthritis(714.0)     Umbilical hernia 8/13/2015       Family History   Problem Relation Age of Onset    Cancer Mother         Lung Cancer    Emphysema Mother     Heart attack Mother     Cancer Father         Lung Cancer    Osteoarthritis Father     Lung cancer Father     Skin cancer Father     Heart disease  Brother     Heart attack Brother     Osteoarthritis Paternal Aunt     Retinal detachment Maternal Aunt     No Known Problems Sister     No Known Problems Maternal Uncle     No Known Problems Paternal Uncle     No Known Problems Maternal Grandmother     No Known Problems Maternal Grandfather     No Known Problems Paternal Grandmother     No Known Problems Paternal Grandfather     Colon cancer Neg Hx     Esophageal cancer Neg Hx     Stomach cancer Neg Hx     Celiac disease Neg Hx     Diabetes Neg Hx     Thyroid disease Neg Hx     Amblyopia Neg Hx     Blindness Neg Hx     Cataracts Neg Hx     Glaucoma Neg Hx     Hypertension Neg Hx     Macular degeneration Neg Hx     Strabismus Neg Hx     Stroke Neg Hx      Review of Systems   Constitutional: Positive for fatigue. Negative for activity change and fever.   HENT: Positive for postnasal drip, rhinorrhea and sinus pressure. Negative for congestion and sneezing.    Eyes: Negative for discharge, redness and itching.   Respiratory: Negative for cough, shortness of breath and wheezing.    Cardiovascular: Negative for chest pain.   Gastrointestinal: Negative for diarrhea, nausea and vomiting.   Genitourinary: Negative for dysuria.   Musculoskeletal: Positive for arthralgias and joint swelling.   Skin: Negative for rash.   Neurological: Negative for headaches.   Hematological: Does not bruise/bleed easily.   Psychiatric/Behavioral: Negative for behavioral problems and sleep disturbance.        Objective:   Physical Exam   Constitutional: She is oriented to person, place, and time. She appears well-developed and well-nourished. No distress.   HENT:   Head: Normocephalic.   Right Ear: Tympanic membrane and external ear normal.   Left Ear: Tympanic membrane and external ear normal.   Nose: No mucosal edema, rhinorrhea, sinus tenderness or septal deviation. Right sinus exhibits no maxillary sinus tenderness and no frontal sinus tenderness. Left sinus exhibits  no maxillary sinus tenderness and no frontal sinus tenderness.   Mouth/Throat: Uvula is midline, oropharynx is clear and moist and mucous membranes are normal. No uvula swelling.   Eyes: Conjunctivae are normal. Right eye exhibits no discharge. Left eye exhibits no discharge.   Neck: Normal range of motion. Neck supple.   Cardiovascular: Normal rate and regular rhythm.   Pulmonary/Chest: Effort normal and breath sounds normal. No respiratory distress. She has no wheezes.   Abdominal: Soft. Bowel sounds are normal. There is no tenderness.   Musculoskeletal: She exhibits no edema.   Lymphadenopathy:     She has no cervical adenopathy.   Neurological: She is alert and oriented to person, place, and time.   Skin: Skin is warm. No rash noted. No erythema.   Psychiatric: She has a normal mood and affect. Her behavior is normal. Judgment and thought content normal.   Nursing note and vitals reviewed.        Results for JOYCE GOMEZ (MRN [id number]) as of 1/10/2019 11:01   Ref. Range 11/13/2018 09:47   IgG - Serum Latest Ref Range: 650 - 1600 mg/dL 666   IgM Latest Ref Range: 50 - 300 mg/dL 30 (L)   IgA Latest Ref Range: 40 - 350 mg/dL 121   IgG 1 Latest Ref Range: 382 - 929 mg/dL 367 (L)   IgG 2 Latest Ref Range: 242 - 700 mg/dL 266   IgG 3 Latest Ref Range: 22 - 176 mg/dL 37   IgG 4 Latest Ref Range: 4 - 86 mg/dL 5           Assessment:       1. Chronic rhinitis    2. Recurrent URI (upper respiratory infection)    3. Asthma, unspecified asthma severity, unspecified whether complicated, unspecified whether persistent    4. Non-seasonal allergic rhinitis, unspecified trigger    5. Low serum IgG1 and IgM levels         Plan:       Joyce was seen today for consult, nasal congestion, eye drainage, wheezing and sore throat.    Diagnoses and all orders for this visit:    Chronic rhinitis  -     Cat epithelium IgE; Future  -     Dog dander IgE; Future  -     D. farinae IgE; Future  -     D. pteronyssinus IgE; Future  -      Aspergillus fumagatus IgE; Future  -     Allergen-Alternaria Alternata; Future  -     Cockroach, American IgE; Future  -     Bahia grass IgE; Future  -     Randal IgE; Future  -     Oak, white IgE; Future  -     Allergen-Cedar; Future  -     Plantain, English IgE; Future  -     Marsh elder, rough IgE; Future  -     Ragweed, short, common IgE; Future  -     Allergen, Pecan Tree IgE; Future  -     IgE; Future    Recurrent URI (upper respiratory infection)  -     S.pneumoniae IgG Serotypes; Future   Has has prevnar and pneumovax    Asthma, unspecified asthma severity, unspecified whether complicated, unspecified whether persistent  Cont routine symbicort, prn albuterol    Non-seasonal allergic rhinitis, unspecified trigger  Recommend restart flonase. Nasal saline prior    Low serum IgG1 and IgM levels   Only mild decrease in these, and IgG is low normal   IgG poss mildly decreased d/t chronic steroid therapy     If pneumococcal titers are low, concern of specific antibody deficiency, though likely secondary to chronic immunosuppressive therapies. Would likely monitor freq and severity of mucosal infections closely before considering IgG replacement therapy      Other orders  -     azithromycin (Z-DEVON) 250 MG tablet; Take 2 tablets by mouth on day 1; Take 1 tablet by mouth on days 2-5   Contingent course if no improvement in URI sx's    Fu pending results

## 2019-01-10 NOTE — LETTER
January 10, 2019      Isiah Moran MD  [address]  [facility] [address]           [facility]  [address]  [facility] [address]  Phone: [phone]  Fax: [phone]          Patient: Joyce Peterson   MR Number: [id number]   YOB: 1960   Date of Visit: 1/10/2019       Dear Dr. Isiah Moran:    Thank you for referring Joyce Peterson to me for evaluation. Attached you will find relevant portions of my assessment and plan of care.    If you have questions, please do not hesitate to call me. I look forward to following Joyce Peterson along with you.    Sincerely,    Catrachito Patel MD    Enclosure  CC:  No Recipients    If you would like to receive this communication electronically, please contact [email or web address] or [phone] to request more information on [employer] access.    For providers and/or their staff who would like to refer a patient to [facility], please contact us through our one-stop-shop provider referral line, [facility], at [phone].    If you feel you have received this communication in error or would no longer like to receive these types of communications, please e-mail [email or web address]

## 2019-01-10 NOTE — PROGRESS NOTES
"  Occupational Therapy Daily Treatment Note      Date: 1/10/2019  Name: Joyce Peterson  Clinic Number: [id number]  Name: Joyce Peterson  Clinic Number: [id number]     Medical Diagnosis: Left triceps rupture s/p repair   Date of Onset:~ Sept 20th  Date of Surgery: 10/17/2018  Surgical Procedure: Left triceps repair with Arthrex fiberwire  Therapy Diagnosis:        Encounter Diagnosis   Name Primary?    Pain in left elbow        Precautions: Standard- , slower healing times due to RA and steroid use.      Physician: Kymberly Noriega PA via Dr. Yarbrough  Physician Orders:Cont therapy PROM, strengthening  Date of Return to MD: 01/03/2019 @ 100PM     Evaluation Date: 11/1/2018  Plan of Care Certification Period: 01/10/2019-02/22/2019     Visit #: / Visits authorized: 9/50  Insurance Authorization period Expiration: 12/31/2019    Time In:1 PM  Time Out: 2 PM   Total Billable Time: 40 minutes   Charges for this Visit: MT, TE x2    Subjective     Pt reports: " I still can't extend it all the way or flex it all the way without pain"    she was compliant with home exercise program given last session.   Response to previous treatment: no complaints following increased exercises last session  Functional change: increased ability to dress self    Pain: 1/10  Location: left elbow- achy feeling    Objective        Elbow and Wrist ROM. Measured in degrees.    11/1/2018 11/1/2018 11/13/2018 12/4/2018 01/10/2019     Right Left* Left Left Left     AROM AROM AROM AROM AROM   Elbow Ext/Flex 0/153 30-90 in brace 25-90 18/130 12/150   Supination/Pronation 55/90 45/80 70/80 60/90    Wrist Ext/Flex WNL 55/30 55/30 45/40    Wrist RD/UD WNL 15/15 15/15 15/25              Strength (Dynamometer) and Pinch Strength (Pinch Gauge)  Measured in pounds.    11/1/2018 11/1/2018     Left Right   Rung II Deferred due to protocol Deferred due to protocol   Key Pinch       3pt Pinch       2pt Pinch          Sensation: Intact per report     CMS " Impairment/Limitation/Restriction for FOTO Survey  Therapist reviewed FOTO scores for Joyce Peterson.  FOTO documents entered into EPIC - see Media section.     Intake Limitation Score: 72% - 11/1/2018  6th visit Limitation Score: 65%-12/11/2018  Category: Self-Care     Current : CL= at least 60% but < 80% impaired, limited or restricted  Goal: CK = at least 40% but < 60% impaired, limited or restricted  Discharge: TBD                 Joyce received the following supervised modalities after being cleared for contradictions for 10 minutes:   -Patient received Moist Heat in supine position with gravity assisted extension to left elbow to increase blood flow, circulation and tissue elasticity prior to therex        Joyce received the following manual therapy techniques for 15 minutes:   -Performed RM to FA  to decrease edema, improve joint mobility, decrease pain and improve lymphatic drainage to increase hand function.   -Performed scar massage to  Incision to decrease adhesions and improve tensile glide.   - Gentle PROM to elbow extension         Joyce received therapeutic exercises for 25 minutes including:  -  AROM flx/ext   x 3/10    AROM   Sup/Pro X 10 reps   Shoulder    scapula squeezes  Shoulder rolls       x 3/10 reps    Wrist PRE X 3/10 x 2# weightx 3 ways    Elbow flexion  X 3/10 x 3# weight  Bicep curl   Light grippers X 10 squeezes each (~6 grippers)    green ball wall walks X 30                      Home Exercises and Education Provided     Education provided: Self stretch with 1-3# weight at home for elbow ext.  Verbalized understanding.   - Progress towards goals     Written Home Exercises Provided: Patient instructed to cont prior HEP.  Exercises were reviewed and Joyce was able to demonstrate them prior to the end of the session.  Joyce demonstrated good  understanding of the education provided.   .   See EMR under Patient Instructions for exercises provided prior visit.     Assessment      Joyce Peterson is a 58 y.o. female referred to outpatient occupational/hand therapy and presents with a medical diagnosis of Left triceps tear s/p repair 12 weeks.She reports concerns about being unable to achieve full elbow extension or flexion. Use of low load long progression stretch for elbow ext with heat prior exercises this date.      Joyce is progressing well towards her goals and there are no updates to goals at this time. Pt prognosis continues as Excellent. Pt will continue to benefit from skilled outpatient occupational therapy to address the deficits listed in the problem list on initial evaluation, provide pt/family education and to maximize pt's level of independence in the home and community environment.     Anticipated barriers to continued occupational therapy: none    Pt's spiritual, cultural and educational needs considered and pt agreeable to plan of care and goals.    Goals     Long Term Goals (LTGs); to be met by discharge.  LTG #1: Pt will report a pain level of 0 out of 10 at rest.  LTG #2: Pt will demo improved FOTO score by 19 points.   LTG #3: Pt will return to prior level of function for ADLs and household management.      Short Term Goals (STGs); to be met within 6 weeks (12/15/2018).  STG #1a: Pt will report 2 out of 10 pain level at rest.MET  STG #2b: Pt will demonstrate independence with issued HEP. MET  STG #3b: Pt will demo improved elbow ROM to WFL (  degrees Actively). MET    Plan     Continue skilled occupational therapy with individualized plan of care focusing on range of motion and strength    Discussed Plan of Care with patient: Yes      Clare Cruz, OT

## 2019-01-11 ENCOUNTER — TELEPHONE (OUTPATIENT)
Dept: RHEUMATOLOGY | Facility: CLINIC | Age: 59
End: 2019-01-11

## 2019-01-11 DIAGNOSIS — M06.9 RHEUMATOID ARTHRITIS, INVOLVING UNSPECIFIED SITE, UNSPECIFIED RHEUMATOID FACTOR PRESENCE: ICD-10-CM

## 2019-01-11 DIAGNOSIS — Z51.81 ENCOUNTER FOR MONITORING LEFLUNOMIDE THERAPY: Primary | ICD-10-CM

## 2019-01-11 DIAGNOSIS — Z79.899 ENCOUNTER FOR MONITORING LEFLUNOMIDE THERAPY: Primary | ICD-10-CM

## 2019-01-11 RX ORDER — LEFLUNOMIDE 20 MG/1
20 TABLET ORAL DAILY
Qty: 30 TABLET | Refills: 0 | Status: SHIPPED | OUTPATIENT
Start: 2019-01-11 | End: 2019-02-08 | Stop reason: SDUPTHER

## 2019-01-14 ENCOUNTER — OFFICE VISIT (OUTPATIENT)
Dept: RHEUMATOLOGY | Facility: CLINIC | Age: 59
End: 2019-01-14
Payer: MEDICARE

## 2019-01-14 ENCOUNTER — CLINICAL SUPPORT (OUTPATIENT)
Dept: REHABILITATION | Facility: HOSPITAL | Age: 59
End: 2019-01-14
Payer: MEDICARE

## 2019-01-14 VITALS
DIASTOLIC BLOOD PRESSURE: 74 MMHG | HEIGHT: 64 IN | BODY MASS INDEX: 26.29 KG/M2 | SYSTOLIC BLOOD PRESSURE: 127 MMHG | HEART RATE: 98 BPM | WEIGHT: 154 LBS

## 2019-01-14 DIAGNOSIS — M06.9 RHEUMATOID ARTHRITIS, INVOLVING UNSPECIFIED SITE, UNSPECIFIED RHEUMATOID FACTOR PRESENCE: Primary | ICD-10-CM

## 2019-01-14 DIAGNOSIS — M25.522 PAIN IN LEFT ELBOW: ICD-10-CM

## 2019-01-14 DIAGNOSIS — R29.898 WEAKNESS OF LEFT ARM: ICD-10-CM

## 2019-01-14 DIAGNOSIS — M25.622 STIFFNESS OF LEFT ELBOW JOINT: ICD-10-CM

## 2019-01-14 PROCEDURE — 3008F BODY MASS INDEX DOCD: CPT | Mod: CPTII,S$GLB,, | Performed by: INTERNAL MEDICINE

## 2019-01-14 PROCEDURE — 99999 PR PBB SHADOW E&M-EST. PATIENT-LVL V: ICD-10-PCS | Mod: PBBFAC,,, | Performed by: INTERNAL MEDICINE

## 2019-01-14 PROCEDURE — 99214 OFFICE O/P EST MOD 30 MIN: CPT | Mod: S$GLB,,, | Performed by: INTERNAL MEDICINE

## 2019-01-14 PROCEDURE — 97110 THERAPEUTIC EXERCISES: CPT | Mod: PN

## 2019-01-14 PROCEDURE — 97140 MANUAL THERAPY 1/> REGIONS: CPT | Mod: PN

## 2019-01-14 PROCEDURE — 99214 PR OFFICE/OUTPT VISIT, EST, LEVL IV, 30-39 MIN: ICD-10-PCS | Mod: S$GLB,,, | Performed by: INTERNAL MEDICINE

## 2019-01-14 PROCEDURE — 99999 PR PBB SHADOW E&M-EST. PATIENT-LVL V: CPT | Mod: PBBFAC,,, | Performed by: INTERNAL MEDICINE

## 2019-01-14 PROCEDURE — 3008F PR BODY MASS INDEX (BMI) DOCUMENTED: ICD-10-PCS | Mod: CPTII,S$GLB,, | Performed by: INTERNAL MEDICINE

## 2019-01-14 ASSESSMENT — DISEASE ACTIVITY SCORE (DAS28)
SWOLLEN_JOINTS_COUNT: 0
ESR_MM_PER_HR: 4
CRP_MG_PER_LITER: .5
TENDER_JOINTS_COUNT: 5
TOTAL_SCORE_CRP: 3.27
TOTAL_SCORE_ESR: 3.13
GLOBAL_HEALTH_SCORE: 65

## 2019-01-14 ASSESSMENT — ROUTINE ASSESSMENT OF PATIENT INDEX DATA (RAPID3)
MDHAQ FUNCTION SCORE: 1.8
FATIGUE SCORE: 5
PATIENT GLOBAL ASSESSMENT SCORE: 6.5
TOTAL RAPID3 SCORE: 6.66
AM STIFFNESS SCORE: 1, YES
PSYCHOLOGICAL DISTRESS SCORE: 2.2
PAIN SCORE: 7.5
WHEN YOU AWAKENED IN THE MORNING OVER THE LAST WEEK, PLEASE INDICATE THE AMOUNT OF TIME IT TAKES UNTIL YOU ARE AS LIMBER AS YOU WILL BE FOR THE DAY: 24 HRS

## 2019-01-14 NOTE — PROGRESS NOTES
"  Occupational Therapy Daily Treatment Note      Date: 1/14/2019  Name: Joyce Peterson  Clinic Number: [id number]  Name: Joyce Peterson  Clinic Number: [id number]     Medical Diagnosis: Left triceps rupture s/p repair   Date of Onset:~ Sept 20th  Date of Surgery: 10/17/2018  Surgical Procedure: Left triceps repair with Arthrex fiberwire  Therapy Diagnosis:        Encounter Diagnosis   Name Primary?    Pain in left elbow        Precautions: Standard- , slower healing times due to RA and steroid use.      Physician: Kymberly Noriega PA via Dr. Yarbrough  Physician Orders:Cont therapy PROM, strengthening  Date of Return to MD: 01/03/2019 @ 100PM     Evaluation Date: 11/1/2018  Plan of Care Certification Period: 01/10/2019-02/22/2019     Visit #: / Visits authorized: 10/50  Insurance Authorization period Expiration: 12/31/2019    Time In:2 PM  Time Out: 242PM   Total Billable Time: 42 minutes   Charges for this Visit: MT, TE x2    Subjective     Pt reports: "My elbow is feeling good- I'm very congested today"    she was compliant with home exercise program given last session.   Response to previous treatment: it was sore but in a good way  Functional change: increased ability to dress self    Pain: 1/10  Location: left elbow- achy feeling    Objective        Elbow and Wrist ROM. Measured in degrees.    11/1/2018 11/1/2018 11/13/2018 12/4/2018 01/10/2019     Right Left* Left Left Left     AROM AROM AROM AROM AROM   Elbow Ext/Flex 0/153 30-90 in brace 25-90 18/130 12/150   Supination/Pronation 55/90 45/80 70/80 60/90    Wrist Ext/Flex WNL 55/30 55/30 45/40    Wrist RD/UD WNL 15/15 15/15 15/25              Strength (Dynamometer) and Pinch Strength (Pinch Gauge)  Measured in pounds.    11/1/2018 11/1/2018     Left Right   Rung II Deferred due to protocol Deferred due to protocol   Key Pinch       3pt Pinch       2pt Pinch          Sensation: Intact per report     CMS Impairment/Limitation/Restriction for FOTO " Survey  Therapist reviewed FOTO scores for Joyce Peterson.  FOTO documents entered into [employer] - see Media section.     Intake Limitation Score: 72% - 11/1/2018  6th visit Limitation Score: 65%-12/11/2018  Category: Self-Care     Current : CL= at least 60% but < 80% impaired, limited or restricted  Goal: CK = at least 40% but < 60% impaired, limited or restricted  Discharge: TBD                 Joyce received the following supervised modalities after being cleared for contradictions for 10 minutes:   -Patient received Moist Heat in supine position with gravity assisted extension to left elbow to increase blood flow, circulation and tissue elasticity prior to therex        Joyce received the following manual therapy techniques for 8 minutes:   -  - Gentle PROM to elbow extension         Joyce received therapeutic exercises for 24 minutes including:  -  AROM flx/ext   x 10 x 3 ways    AROM   Sup/Pro X 10 reps   Shoulder    scapula squeezes  Shoulder rolls       x 3/10 reps    Wrist PRE X 3/10 x 2# weightx 3 ways    Elbow flexion  X 3/10 x 3# weight  Bicep curl   Light grippers X 10 squeezes each (~6 grippers)    green ball wall walks X 30    Yellow theraband  X 30 tricep extension                Home Exercises and Education Provided     Education provided: Self stretch with 1-3# weight at home for elbow ext.  Verbalized understanding.   - Progress towards goals     Written Home Exercises Provided: Patient instructed to cont prior HEP.  Exercises were reviewed and Joyce was able to demonstrate them prior to the end of the session.  Joyce demonstrated good  understanding of the education provided.   .   See EMR under Patient Instructions for exercises provided prior visit.     Assessment     Joyce Peterson is a 58 y.o. female referred to outpatient occupational/hand therapy and presents with a medical diagnosis of Left triceps tear s/p repair 12 weeks.She reports concerns about being unable to achieve full  elbow extension or flexion. Use of low load long progression stretch for elbow ext with heat prior exercises this date.      Joyce is progressing well towards her goals and there are no updates to goals at this time. Pt prognosis continues as Excellent. Pt will continue to benefit from skilled outpatient occupational therapy to address the deficits listed in the problem list on initial evaluation, provide pt/family education and to maximize pt's level of independence in the home and community environment.     Anticipated barriers to continued occupational therapy: none    Pt's spiritual, cultural and educational needs considered and pt agreeable to plan of care and goals.    Goals     Long Term Goals (LTGs); to be met by discharge.  LTG #1: Pt will report a pain level of 0 out of 10 at rest.  LTG #2: Pt will demo improved FOTO score by 19 points.   LTG #3: Pt will return to prior level of function for ADLs and household management.      Short Term Goals (STGs); to be met within 6 weeks (12/15/2018).  STG #1a: Pt will report 2 out of 10 pain level at rest.MET  STG #2b: Pt will demonstrate independence with issued HEP. MET  STG #3b: Pt will demo improved elbow ROM to WFL (  degrees Actively). MET    Plan     Continue skilled occupational therapy with individualized plan of care focusing on range of motion and strength    Discussed Plan of Care with patient: Yes      Clare Cruz, OT

## 2019-01-14 NOTE — PROGRESS NOTES
"Subjective:       Patient ID: Joyce Peterson is a 58 y.o. female.    Chief Complaint: "Here to change medications, everything bothers me"    Joyce Peterson is a 67 yo F with RA and fibromyalgia here for follow up.  Since her last visit, her hips, lower back, and feet have been the most bothersome and "Lives in a flare".  Wishes to try something in place of tofacitinib.  She also endorses swelling of the right hand and knee, diffuse tenderness in the hips, feet, ankles, wrists, and both hands, and last week erythema of right hand & foot.  One day last week, she increased her prednisone from 5mg to 7mg one day that alleviated her flare.     She last had a URI 3 weeks ago and feels better.  She is currently taking azithromycin and will finish on Wednesday. She last took Leflunoamid and xeljanz on Saturday.  Allergy suggested possible IgG replacement pending medication changes this appointment.  She has received the flu vaccine but has not received the shingles vaccine.  She also endorses dry eyes, red eyes, dry mouth, mouth ulcers resolved and improved hair thinning/loss since stopping MTX during surgery. She is sleeping 7-8 hours with multiple interruptions due to shoulder pain and back pain.  She has done physical therapy in [place] for 12 weeks at 2 sessions per week; 1 month remaining for left elbow ROM.  She is also riding her bicycle 1-3 times per week.    Review of Systems   Constitutional: Positive for fatigue.   HENT: Negative for mouth sores.         Dry mouth   Eyes: Positive for redness.        Eye dryness   Respiratory: Positive for cough and shortness of breath.    Cardiovascular: Negative for chest pain.   Gastrointestinal: Negative for abdominal pain.   Genitourinary: Negative for dysuria.   Musculoskeletal: Positive for arthralgias.   Neurological: Positive for headaches.         Objective:   /74   Pulse 98   Ht 5' 3.6" (1.615 m)   Wt 69.9 kg (154 lb)   LMP  (LMP " Unknown)   BMI 26.77 kg/m²      Physical Exam   Constitutional: She is oriented to person, place, and time and well-developed, well-nourished, and in no distress. No distress.   HENT:   Head: Normocephalic and atraumatic.   Right Ear: External ear normal.   Left Ear: External ear normal.   Mouth/Throat: No oropharyngeal exudate.   Eyes: Conjunctivae and EOM are normal. Right eye exhibits no discharge. Left eye exhibits no discharge. No scleral icterus.   Cardiovascular: Normal rate and regular rhythm.    Pulmonary/Chest: Effort normal and breath sounds normal. No respiratory distress. She has no wheezes.   Abdominal: Bowel sounds are normal. She exhibits no distension.         Right Side Rheumatological Exam     Examination finds the 1st PIP, 2nd PIP, 3rd PIP, 3rd MCP, 4th PIP, 4th MCP, 5th PIP and 5th MCP normal.    The patient is tender to palpation of the wrist and 2nd MCP    The patient has an enlarged 1st MCP and 2nd MCP, and wrist     Knee Exam   Tenderness Location: popliteal fossa     Elbow/Wrist Exam   Tenderness Location: olecranon process     Range of Motion   Elbow   Supination: abnormal      Range of Motion   Wrist   Abduction: abnormal  Adduction: abnormal     Muscle Strength (0-5 scale):  Deltoid:  5  Biceps: 5/5   Triceps:  5  : 5/5   Iliopsoas: 5  Quadriceps:  5   Distal Lower Extremity: 5     Left Side Rheumatological Exam     Examination finds the shoulder, 1st PIP, 2nd PIP, 3rd PIP, 3rd MCP, 4th PIP, 4th MCP, 5th PIP and 5th MCP normal.    The patient is tender to palpation of the shoulder, elbow, and wrist    The patient has an enlarged 1st MCP and 2nd MCP and wrist.     Knee Exam   Tenderness Location: popliteal fossa     Elbow/Wrist Exam   Tenderness Location: olecranon process     Range of Motion   Elbow   Supination: abnormal      Range of Motion   Wrist   Abduction: abnormal  Adduction: abnormal     Muscle Strength (0-5 scale):  :  5/5   Iliopsoas: 5  Quadriceps:  5   Distal Lower  Extremity: 5     Neurological: She is alert and oriented to person, place, and time. She has normal reflexes. She displays normal reflexes. No cranial nerve deficit. She exhibits normal muscle tone. Gait normal. Coordination normal. GCS score is 15.   Reflex Scores:       Bicep reflexes are 2+ on the right side.       Brachioradialis reflexes are 2+ on the right side.       Patellar reflexes are 2+ on the right side and 2+ on the left side.       Achilles reflexes are 2+ on the right side and 2+ on the left side.  Skin: Skin is warm and dry. No rash noted. She is not diaphoretic. No erythema. No pallor.     Psychiatric: Mood, memory, affect and judgment normal.   Musculoskeletal: She exhibits no edema or deformity.      Assessment:       TJ 5   SJ 0   Pt global 65 ESR 4 CRP 0.5 DAS28 3.13   TKX96-ICT 3.27     Plan:       -QG-TB and HBcAB(total) today  -Once URI resolved, *Shingrix #1  -Cont leflunomide 20mg daily  -Stop tofacitinib  -Once URI resolved & receives Shingrix  -Wait 2 wks, then start sarilumab 200mg sc every 14 days. Risks and benefits discussed including infection risk, need to monitor cbc, cmp monthly x 3, then q 3 months and lipids in 2 months-Standing labs each month  -Lipid panel in 2 months  -Return to clinic in 3 months

## 2019-01-14 NOTE — PROGRESS NOTES
I have personally taken the history and examined the patient and agree with the resident,s note as stated above         RA: erosive RA discontinued long term methotrexate b/o nausea with po and sc and reduced dose, allergic to sulfa, and had reaction to hydroxychloroquine;  tolerating leflunomide and tofacitinib.  Has failed 3 TNFi(infliximab, etanercept, adalimumab), abatacept, hypogamma with rituximab and gets frequent infections in any event, acute vertigo/dizziness with tocilizumab x2  Other options in future: sarilumab sc(2nd IL-6r antibody( I would favor as had vertigo with single sc injection of tocilizumab but never took long enough to evaluate efficacy, certolizumab(4th anti-TNF, less likely efficacy), baricitinib(2nd USMAN inhibitor tofacitinib and likely not as effective at FDA approved dose); anakinra  TJ 5   SJ 0   Pt global 65 ESR 4 CRP 0.5 DAS28 3.13(LDA)   QHJ40-PFX 3.27(MDA)    Stop tofacitinib  Cont leflunomide 20mg daily  QG-TB and HBcAB(total) today  Once URI resolved, *Shingrix #1  Wait 2 wks, then start sarilumab 200mg sc every 14 days. Risks and benefits discussed including infection risk, need to monitor cbc, cmp monthly x 3, then q 3 months and lipids in 2 months  RTC 3 months

## 2019-01-14 NOTE — PATIENT INSTRUCTIONS
-Bloodwork today  -Shingles vaccine on Thursday after finished antibiotics  -Continue Leflunoamide; no need stop for respiratory infection  -Stop Xeljanz  -After done antibiotics on Thursday, start Kevzara 200mg injection every 2 weeks  -Standing labs each month  -Lipid panel in 2 months  -Return to clinic in 3 months

## 2019-01-15 ENCOUNTER — TELEPHONE (OUTPATIENT)
Dept: PHARMACY | Facility: CLINIC | Age: 59
End: 2019-01-15

## 2019-01-15 ENCOUNTER — PATIENT MESSAGE (OUTPATIENT)
Dept: ALLERGY | Facility: CLINIC | Age: 59
End: 2019-01-15

## 2019-01-17 ENCOUNTER — CLINICAL SUPPORT (OUTPATIENT)
Dept: REHABILITATION | Facility: HOSPITAL | Age: 59
End: 2019-01-17
Payer: MEDICARE

## 2019-01-17 ENCOUNTER — TELEPHONE (OUTPATIENT)
Dept: RHEUMATOLOGY | Facility: CLINIC | Age: 59
End: 2019-01-17

## 2019-01-17 DIAGNOSIS — M25.522 PAIN IN LEFT ELBOW: ICD-10-CM

## 2019-01-17 DIAGNOSIS — R29.898 WEAKNESS OF LEFT ARM: ICD-10-CM

## 2019-01-17 DIAGNOSIS — M06.9 RHEUMATOID ARTHRITIS, INVOLVING UNSPECIFIED SITE, UNSPECIFIED RHEUMATOID FACTOR PRESENCE: Primary | ICD-10-CM

## 2019-01-17 DIAGNOSIS — M25.622 STIFFNESS OF LEFT ELBOW JOINT: ICD-10-CM

## 2019-01-17 PROCEDURE — 97140 MANUAL THERAPY 1/> REGIONS: CPT | Mod: PN

## 2019-01-17 PROCEDURE — 97110 THERAPEUTIC EXERCISES: CPT | Mod: PN

## 2019-01-17 NOTE — PROGRESS NOTES
"  Occupational Therapy Daily Treatment Note      Date: 1/17/2019  Name: Joyce Peterson  Clinic Number: [id number]  Name: Joyce Peterson  Clinic Number: [id number]     Medical Diagnosis: Left triceps rupture s/p repair   Date of Onset:~ Sept 20th  Date of Surgery: 10/17/2018  Surgical Procedure: Left triceps repair with Arthrex fiberwire  Therapy Diagnosis:        Encounter Diagnosis   Name Primary?    Pain in left elbow        Precautions: Standard- , slower healing times due to RA and steroid use.      Physician: Kymberly Noriega PA via Dr. Yarbrough  Physician Orders:Cont therapy PROM, strengthening  Date of Return to MD: Pt. Needs to schedule     Evaluation Date: 11/1/2018  Plan of Care Certification Period: 01/10/2019-02/22/2019     Visit #: / Visits authorized: 10/50  Insurance Authorization period Expiration: 12/31/2019    Time In:1030AM  Time Out: 1115AM   Total Billable Time: 45 minutes   Charges for this Visit: MT, TE x2    Subjective     Pt reports: "My elbow is feeling good, it cracks a lot, especially in the morning."    she was compliant with home exercise program given last session.   Response to previous treatment: it was sore but in a good way  Functional change: increased ability to dress self    Pain: 1/10  Location: left elbow- achy feeling    Objective        Elbow and Wrist ROM. Measured in degrees.    11/1/2018 11/1/2018 11/13/2018 12/4/2018 01/10/2019     Right Left* Left Left Left     AROM AROM AROM AROM AROM   Elbow Ext/Flex 0/153 30-90 in brace 25-90 18/130 12/150   Supination/Pronation 55/90 45/80 70/80 60/90    Wrist Ext/Flex WNL 55/30 55/30 45/40    Wrist RD/UD WNL 15/15 15/15 15/25              Strength (Dynamometer) and Pinch Strength (Pinch Gauge)  Measured in pounds.    11/1/2018 11/1/2018     Left Right   Rung II Deferred due to protocol Deferred due to protocol   Key Pinch       3pt Pinch       2pt Pinch          Sensation: Intact per report     CMS " Impairment/Limitation/Restriction for FOTO Survey  Therapist reviewed FOTO scores for Joyce Peterson.  FOTO documents entered into [employer] - see Media section.     Intake Limitation Score: 72% - 11/1/2018  6th visit Limitation Score: 65%-12/11/2018  Category: Self-Care     Current : CL= at least 60% but < 80% impaired, limited or restricted  Goal: CK = at least 40% but < 60% impaired, limited or restricted  Discharge: TBD                 Joyce received the following supervised modalities after being cleared for contradictions for 10 minutes:   -Patient received Moist Heat in supine position with gravity assisted extension to left elbow to increase blood flow, circulation and tissue elasticity prior to therex        Joyce received the following manual therapy techniques for 8 minutes:   -  - Gentle PROM to elbow extension         Joyce received therapeutic exercises for 24 minutes including:  -  AROM flx/ext   x 10 x 3 ways    AROM   Sup/Pro X 10 reps   Shoulder    scapula squeezes  Shoulder rolls       x 3/10 reps    Wrist PRE X 3/10 x 2# weightx 3 ways    Elbow flexion  X 3/10 x 3# weight  Bicep curl   Light grippers X 10 squeezes each (~6 grippers)    green ball wall walks X 30    Yellow theraband  X 30 tricep extension                Home Exercises and Education Provided     Education provided: Self stretch with 1-3# weight at home for elbow ext.  Verbalized understanding.   - Progress towards goals     Written Home Exercises Provided: Patient instructed to cont prior HEP.  Exercises were reviewed and Joyce was able to demonstrate them prior to the end of the session.  Joyce demonstrated good  understanding of the education provided.   .   See EMR under Patient Instructions for exercises provided prior visit.     Assessment     Pt. Is 12 weeks s/p tricep repair. She is progressing well. Increased elbow flx and ext after heat and stretch. No complaints after adding tricep resistance last session.      Joyce  is progressing well towards her goals and there are no updates to goals at this time. Pt prognosis continues as Excellent. Pt will continue to benefit from skilled outpatient occupational therapy to address the deficits listed in the problem list on initial evaluation, provide pt/family education and to maximize pt's level of independence in the home and community environment.     Anticipated barriers to continued occupational therapy: none    Pt's spiritual, cultural and educational needs considered and pt agreeable to plan of care and goals.    Goals     Long Term Goals (LTGs); to be met by discharge.  LTG #1: Pt will report a pain level of 0 out of 10 at rest.  LTG #2: Pt will demo improved FOTO score by 19 points.   LTG #3: Pt will return to prior level of function for ADLs and household management.      Short Term Goals (STGs); to be met within 6 weeks (12/15/2018).  STG #1a: Pt will report 2 out of 10 pain level at rest.MET  STG #2b: Pt will demonstrate independence with issued HEP. MET  STG #3b: Pt will demo improved elbow ROM to WFL (  degrees Actively). MET    Plan     Continue skilled occupational therapy with individualized plan of care focusing on range of motion and strength    Discussed Plan of Care with patient: Yes      Clare Cruz, OT

## 2019-01-21 ENCOUNTER — TELEPHONE (OUTPATIENT)
Dept: RHEUMATOLOGY | Facility: CLINIC | Age: 59
End: 2019-01-21

## 2019-01-21 ENCOUNTER — OFFICE VISIT (OUTPATIENT)
Dept: OPTOMETRY | Facility: CLINIC | Age: 59
End: 2019-01-21
Payer: MEDICARE

## 2019-01-21 ENCOUNTER — PATIENT MESSAGE (OUTPATIENT)
Dept: RHEUMATOLOGY | Facility: CLINIC | Age: 59
End: 2019-01-21

## 2019-01-21 DIAGNOSIS — M25.551 RIGHT HIP PAIN: Primary | ICD-10-CM

## 2019-01-21 DIAGNOSIS — H52.4 PRESBYOPIA: ICD-10-CM

## 2019-01-21 DIAGNOSIS — H04.123 DRY EYES, BILATERAL: ICD-10-CM

## 2019-01-21 DIAGNOSIS — H53.001 AMBLYOPIA EX ANOPSIA OF RIGHT EYE: ICD-10-CM

## 2019-01-21 DIAGNOSIS — H01.119 CONTACT DERMATITIS OF EYELID, UNSPECIFIED LATERALITY: Primary | ICD-10-CM

## 2019-01-21 DIAGNOSIS — Z13.5 SCREENING FOR EYE CONDITION: ICD-10-CM

## 2019-01-21 DIAGNOSIS — H52.03 HYPERMETROPIA OF BOTH EYES: ICD-10-CM

## 2019-01-21 DIAGNOSIS — H25.13 NUCLEAR SCLEROSIS OF BOTH EYES: ICD-10-CM

## 2019-01-21 PROCEDURE — 99999 PR PBB SHADOW E&M-EST. PATIENT-LVL III: ICD-10-PCS | Mod: PBBFAC,,, | Performed by: OPTOMETRIST

## 2019-01-21 PROCEDURE — 92015 PR REFRACTION: ICD-10-PCS | Mod: S$GLB,,, | Performed by: OPTOMETRIST

## 2019-01-21 PROCEDURE — 92014 PR EYE EXAM, EST PATIENT,COMPREHESV: ICD-10-PCS | Mod: S$GLB,,, | Performed by: OPTOMETRIST

## 2019-01-21 PROCEDURE — 92014 COMPRE OPH EXAM EST PT 1/>: CPT | Mod: S$GLB,,, | Performed by: OPTOMETRIST

## 2019-01-21 PROCEDURE — 92015 DETERMINE REFRACTIVE STATE: CPT | Mod: S$GLB,,, | Performed by: OPTOMETRIST

## 2019-01-21 PROCEDURE — 99999 PR PBB SHADOW E&M-EST. PATIENT-LVL III: CPT | Mod: PBBFAC,,, | Performed by: OPTOMETRIST

## 2019-01-21 RX ORDER — FLUCONAZOLE 100 MG/1
100 TABLET ORAL DAILY
Refills: 3 | COMMUNITY
Start: 2019-01-11 | End: 2019-12-16

## 2019-01-21 RX ORDER — PREDNISONE 1 MG/1
3 TABLET ORAL DAILY
Refills: 1 | COMMUNITY
Start: 2019-01-06 | End: 2019-03-21 | Stop reason: SDUPTHER

## 2019-01-21 NOTE — PATIENT INSTRUCTIONS
Recent history of bilateral periocular dermatitis affecting upper external eyelids.  Prescribed FML ophthalmic ointment.  Apply to affected external eyelid tissue once or twice per day as needed.     History of Sjogren's syndrome.   Persistent dry eye symptoms, even with current treatment.  Continue Restasis in both eyes - one drop two times per day in both eyes.  Continue autologuus tears in both eyes four to six times per day.  Suggest consider Joel Tears and will have demonstration done today.    Otherwise, ocular health appears good in both eyes.  Hyperopia in the right eye, and hyperopia with astigmatism in the left eye.  Mild amblyopia in the right eye.  Presbyopia consistent with age.  New spectacle lens Rx issued    Okay to continue part-time/occasional wear with 1 Day Acuvue Moist SCLs.  No change made to last CL Rx issued.  New (duplicate) CL Rx issued.  Use OTC reading glasses over CLs as needed.    Recheck in six months.     NEED LETTER OF MEDICAL NECESSITY FOR JOEL TEARS DEVICE.

## 2019-01-21 NOTE — TELEPHONE ENCOUNTER
Please schedule x-ray right hip this pm, and appt with me tomorrow @ 3:30 pm. No fall or trauma? If so, needs to see Ortho. Thanks MEGA

## 2019-01-21 NOTE — PROGRESS NOTES
HPI     Concerns About Ocular Health      Additional comments: Dry eye problems.  Using Restasis Ophthalmic Emulsion bid OU, and using autologus tears four   to six times per day  - ordered by Dr. Armas.  Blurry distance vision with glasses. - no longer wearing CLs.     Near vision okay.                  Comments     Approximate date of last eye examination:  01/17/2018  Name of last eye doctor seen:  Dr Jurado  Wears glasses? yes     If yes, wears full-time or part-time? Full time   Present glasses are bifocal / S V Distance / S V Reading:  Progressives  Approximate age of present glasses:  2+ yrs  Got new glasses following last exam, or subsequently?:  no   Any problem with VA with glasses?  Pt reports having trouble with glasses   and CL's - came in without CLs in today. Last wore CLs approximately two   weeks ago.    Wears CLs?:  yes           If yes:              Type of CL worn: 1 Day Acuvue Moist                     OD  8.5   +5.25                      OS 8.5    +4.00              Wears full-time or part-time:  Part time               Sleeps with contact lenses:  no               CL Solution used:  Opti Free                How often replace CLs:  Dailies lens              Any problem with VA with CLs?  All around trouble with   distance VA  and at night/ blur VA            Headaches? no  Eye pain/discomfort?  Yes, dryness                                                                                       Flashes?  no  Floaters?  no  Diplopia/Double vision?  no  Patient's Ocular History:         Any eye surgeries?  no         Any eye injury?  no         Any treatment for eye disease?  Dry eyes  Family history of eye disease?  Aunt-retinal disorder  Significant patient medical history:         1. Diabetes?  no       If yes, IDDM or NIDDM?  n/a   2. HBP?  no              3. Other (describe):  Rheumatoid arthritis, asthma, 8   blockages, being watched   ! OTC eyedrops currently using:  Refresh Tears     !  "Prescription eye meds currently using:  Restasis, bid-OU, Serum drops    4-6 times per day   ! Any history of allergy/adverse reaction to any eye meds used   previously?  no    ! Any history of allergy/adverse reaction to eyedrops used during prior   eye exam(s)? no    ! Any history of allergy/adverse reaction to Novacaine or similar meds?   no   ! Any history of allergy/adverse reaction to Epinephrine or similar meds?   no    ! Patient okay with use of anesthetic eyedrops to check eye pressure? yes            ! Patient okay with use of eyedrops to dilate pupils today? yes    !  Allergies/Medications/Medical History/Family History reviewed today?    yes      PD =   62/59  Desired reading distance =  17.25"     STATES SHE HAS TRIED PUNCTAL PLUGS (? DISSOLVABLE VS NON-DISSOLVABLE   PLUGS) - UNSUCCESSFUL - EXCESSIVE TEARS.                                                                          Last edited by Patrice Jurado, OD on 1/21/2019  1:55 PM. (History)            Assessment /Plan     For exam results, see Encounter Report.    1. Contact dermatitis of eyelid, unspecified laterality  fluorometholone 0.1% (FML) 0.1 % Oint   2. Dry eyes, bilateral     3. Amblyopia ex anopsia of right eye     4. Nuclear sclerosis of both eyes     5. Hypermetropia of both eyes     6. Presbyopia     7. Screening for eye condition                    History of Sjogren's syndrome.   Persistent dry eye symptoms, even with current treatment.  Continue Restasis in both eyes - one drop two times per day in both eyes.  Continue autologuus tears in both eyes four to six times per day.  Suggest consider Joel Tears and will have demonstration done today.    Otherwise, ocular health appears good in both eyes.  Hyperopia in the right eye, and hyperopia with astigmatism in the left eye.  Mild amblyopia in the right eye.  Presbyopia consistent with age.  New spectacle lens Rx issued    Okay to continue part-time/occasional wear with 1 Day Acuvue " Moist SCLs.  No change made to last CL Rx issued.  New (duplicate) CL Rx issued.  Use OTC reading glasses over CLs as needed.    Recheck in six months.

## 2019-01-22 ENCOUNTER — HOSPITAL ENCOUNTER (OUTPATIENT)
Dept: RADIOLOGY | Facility: HOSPITAL | Age: 59
Discharge: HOME | End: 2019-01-22
Attending: INTERNAL MEDICINE
Payer: MEDICARE

## 2019-01-22 ENCOUNTER — PATIENT MESSAGE (OUTPATIENT)
Dept: RHEUMATOLOGY | Facility: CLINIC | Age: 59
End: 2019-01-22

## 2019-01-22 ENCOUNTER — OFFICE VISIT (OUTPATIENT)
Dept: RHEUMATOLOGY | Facility: CLINIC | Age: 59
End: 2019-01-22
Payer: MEDICARE

## 2019-01-22 VITALS
DIASTOLIC BLOOD PRESSURE: 80 MMHG | BODY MASS INDEX: 26.12 KG/M2 | SYSTOLIC BLOOD PRESSURE: 122 MMHG | HEIGHT: 64 IN | WEIGHT: 153 LBS | HEART RATE: 94 BPM

## 2019-01-22 DIAGNOSIS — M25.552 LEFT HIP PAIN: Primary | ICD-10-CM

## 2019-01-22 DIAGNOSIS — M25.552 LEFT HIP PAIN: ICD-10-CM

## 2019-01-22 DIAGNOSIS — M25.551 RIGHT HIP PAIN: ICD-10-CM

## 2019-01-22 DIAGNOSIS — R52 PAIN: Primary | ICD-10-CM

## 2019-01-22 PROCEDURE — 73502 X-RAY EXAM HIP UNI 2-3 VIEWS: CPT | Mod: 26,LT,, | Performed by: RADIOLOGY

## 2019-01-22 PROCEDURE — 99999 PR PBB SHADOW E&M-EST. PATIENT-LVL V: CPT | Mod: PBBFAC,,, | Performed by: INTERNAL MEDICINE

## 2019-01-22 PROCEDURE — 3008F PR BODY MASS INDEX (BMI) DOCUMENTED: ICD-10-PCS | Mod: CPTII,S$GLB,, | Performed by: INTERNAL MEDICINE

## 2019-01-22 PROCEDURE — 99213 PR OFFICE/OUTPT VISIT, EST, LEVL III, 20-29 MIN: ICD-10-PCS | Mod: S$GLB,,, | Performed by: INTERNAL MEDICINE

## 2019-01-22 PROCEDURE — 99213 OFFICE O/P EST LOW 20 MIN: CPT | Mod: S$GLB,,, | Performed by: INTERNAL MEDICINE

## 2019-01-22 PROCEDURE — 73502 X-RAY EXAM HIP UNI 2-3 VIEWS: CPT | Mod: TC,LT

## 2019-01-22 PROCEDURE — 99999 PR PBB SHADOW E&M-EST. PATIENT-LVL V: ICD-10-PCS | Mod: PBBFAC,,, | Performed by: INTERNAL MEDICINE

## 2019-01-22 PROCEDURE — 73502 XR HIP 2 VIEW LEFT: ICD-10-PCS | Mod: 26,LT,, | Performed by: RADIOLOGY

## 2019-01-22 PROCEDURE — 3008F BODY MASS INDEX DOCD: CPT | Mod: CPTII,S$GLB,, | Performed by: INTERNAL MEDICINE

## 2019-01-22 RX ORDER — DIAZEPAM 5 MG/1
TABLET ORAL
Qty: 1 TABLET | Refills: 0 | Status: SHIPPED | OUTPATIENT
Start: 2019-01-22 | End: 2019-01-25 | Stop reason: SDUPTHER

## 2019-01-22 NOTE — PATIENT INSTRUCTIONS
-Start Tramadol as prescribed for pain relief  -Continue Naproxen 500mg BID as needed  -MRI of left hip  -After MRI results obtained start physical therapy  -Return to clinic for appointment originally scheduled

## 2019-01-22 NOTE — PROGRESS NOTES
I have personally taken the history and examined the patient and agree with the resident,s note as stated above Onset while walking severe left ant hip/groin pain. Actually pain began medial superior thigh adductors. Pain severe worse with all movement, slightly better over weekend then worsened. Remains on naproxen 500mg twice daily. Prednisone 5mg daily and leflunomide 20mg daily. Stopped tofacitinib one week ago and awaiting sarilumab approval. No direct trauma. Other joints fine.     Exam: left hip rom is normal without pain Fabere with slight pain left greater trochanter. + pain with resisted adduction>abduction, slightly tender trochanteric bursae bilateral. Log rolling negative.    Left adductor strain, r/o AVN, synovitis, occult fracture, psoas abscess etd    MRI left hip  Ref to PT  Cont current meds  Tramadol 50mg q 6h prn

## 2019-01-22 NOTE — PROGRESS NOTES
"Subjective:       Patient ID: Joyce Peterson is a 58 y.o. female.    Chief Complaint: "Left hip hurts"    Joyce Petreson is a 69 yo F with RA and fibromyalgia here for 1 week follow up with new left hip pain. 4 days ago, she first noticed the pain walking around her house.  The stabbing pain started in the anterior hip, is 9/10 at maximum, and 3/10 aching pain at rest.  There is an aching pain in the posterior hip that she attributes to walking differently.  Standing from sitting, stairs, and starting to walk provokes the pain.  The pain decreases once she has started to walk.  Heating pad, Naproxen, and Advil provide minimal relief. It is waking her up 3-4 times nightly.  She has injections at this clinic before but denies any other interventions.   She says it is completely different than her bursitis pains in the past.  Denies trauma, numbness, weakness, erythema, swelling, or warmth of the joint. "I'm off the Xeljanz now which may incresing my pain."  Her  said she has a pulled Groin, but she has not done this in the past.     She has finished her antibiotics for a URI, but she was started on a Z pack by her dentist with the last dose today. Root canal scheduled Thursday.  Sarilumab authorization still pending.  QG-TB normal     Review of Systems   Constitutional: Negative for chills and fever.   HENT: Positive for congestion.    Respiratory: Negative for shortness of breath.    Cardiovascular: Negative for chest pain.   Musculoskeletal: Positive for arthralgias, back pain, gait problem and myalgias.   Skin: Negative for rash.   Neurological: Negative for weakness and numbness.   Psychiatric/Behavioral: Positive for sleep disturbance.         Objective:   LMP  (LMP Unknown)      Physical Exam   Constitutional: She is well-developed, well-nourished, and in no distress.   HENT:   Head: Normocephalic and atraumatic.   Eyes: EOM are normal.   Neck: Neck supple.   Cardiovascular: Normal rate and regular " rhythm.    Pulmonary/Chest: Effort normal and breath sounds normal.   Abdominal: Soft.       Right Side Rheumatological Exam     Muscle Strength (0-5 scale):  Neck Flexion:  5  Neck Extension: 5  Deltoid:  5  Biceps: 5/5   Triceps:  5  : 5/5   Iliopsoas: 5  Quadriceps:  5   Distal Lower Extremity: 5    Left Side Rheumatological Exam     Knee Exam   Sensation: normal    Hip Exam   Tenderness Location: posterior and greater trochanter    Range of Motion   The patient has normal left hip ROM.  Extension: normal   Flexion: normal   Sensation: normal    Muscle Strength (0-5 scale):  Neck Flexion:  5  Neck Extension: 5  Deltoid:  5  Biceps: 5/5   Triceps:  5  :  5/5   Iliopsoas: 4.6  Quadriceps:  5   Distal Lower Extremity: 5      Neurological: She is alert.   Skin: Skin is warm and dry.     Musculoskeletal: Normal range of motion.         Left Hip:  Resisted hip flexion, abduction, and adduction partially reproduces anterior hip pain  - Elias  - Ely  - SLR  - FADIR   + SANDRA anterior hip pain  - log roll  + Sacral compression     Assessment:       Left hip pain, multifactorial etiology most likely including adductor strain, GT bursitis, and sacroiliac dysfunction    Plan:       -Start Tramadol as prescribed for pain relief  -Continue Naproxen 500mg BID as needed  -MRI of left hip  -After MRI results obtained, start physical therapy  -Return to clinic for appointment originally scheduled      Plan from appointment on 1/14/18 following:  -QG-TB and HBcAB(total) today  -Once URI resolved, *Shingrix #1  -Cont leflunomide 20mg daily  -Stop tofacitinib  -Once URI resolved & receives Shingrix  -Wait 2 wks, then start sarilumab 200mg sc every 14 days. Risks and benefits discussed including infection risk, need to monitor cbc, cmp monthly x 3, then q 3 months and lipids in 2 months-Standing labs each month  -Lipid panel in 2 months  -Return to clinic in 3 months

## 2019-01-25 ENCOUNTER — PATIENT MESSAGE (OUTPATIENT)
Dept: RHEUMATOLOGY | Facility: CLINIC | Age: 59
End: 2019-01-25

## 2019-01-25 ENCOUNTER — TELEPHONE (OUTPATIENT)
Dept: RHEUMATOLOGY | Facility: CLINIC | Age: 59
End: 2019-01-25

## 2019-01-25 RX ORDER — DIAZEPAM 5 MG/1
TABLET ORAL
Qty: 1 TABLET | Refills: 0 | Status: SHIPPED | OUTPATIENT
Start: 2019-01-25 | End: 2019-04-11

## 2019-01-28 ENCOUNTER — HOSPITAL ENCOUNTER (OUTPATIENT)
Dept: RADIOLOGY | Facility: HOSPITAL | Age: 59
Discharge: HOME | End: 2019-01-28
Attending: STUDENT IN AN ORGANIZED HEALTH CARE EDUCATION/TRAINING PROGRAM
Payer: MEDICARE

## 2019-01-28 DIAGNOSIS — M25.552 LEFT HIP PAIN: ICD-10-CM

## 2019-01-28 PROCEDURE — 73723 MRI JOINT LWR EXTR W/O&W/DYE: CPT | Mod: TC,LT

## 2019-01-28 PROCEDURE — 73723 MRI HIP W WO CONTRAST LEFT: ICD-10-PCS | Mod: 26,LT,, | Performed by: RADIOLOGY

## 2019-01-28 PROCEDURE — A9585 GADOBUTROL INJECTION: HCPCS | Performed by: STUDENT IN AN ORGANIZED HEALTH CARE EDUCATION/TRAINING PROGRAM

## 2019-01-28 PROCEDURE — 25500020 PHARM REV CODE 255: Performed by: STUDENT IN AN ORGANIZED HEALTH CARE EDUCATION/TRAINING PROGRAM

## 2019-01-28 PROCEDURE — 73723 MRI JOINT LWR EXTR W/O&W/DYE: CPT | Mod: 26,LT,, | Performed by: RADIOLOGY

## 2019-01-28 RX ORDER — GADOBUTROL 604.72 MG/ML
7 INJECTION INTRAVENOUS
Status: COMPLETED | OUTPATIENT
Start: 2019-01-28 | End: 2019-01-28

## 2019-01-28 RX ADMIN — GADOBUTROL 7 ML: 604.72 INJECTION INTRAVENOUS at 05:01

## 2019-01-28 NOTE — TELEPHONE ENCOUNTER
Documentation Only:    Faxed Prior Authorization form and supporting documentation for Kevzara to insurance on 01/28/2019

## 2019-01-29 ENCOUNTER — DOCUMENTATION ONLY (OUTPATIENT)
Dept: REHABILITATION | Facility: HOSPITAL | Age: 59
End: 2019-01-29

## 2019-01-29 ENCOUNTER — PATIENT MESSAGE (OUTPATIENT)
Dept: REHABILITATION | Facility: HOSPITAL | Age: 59
End: 2019-01-29

## 2019-01-29 ENCOUNTER — TELEPHONE (OUTPATIENT)
Dept: RHEUMATOLOGY | Facility: CLINIC | Age: 59
End: 2019-01-29

## 2019-01-29 ENCOUNTER — PATIENT MESSAGE (OUTPATIENT)
Dept: RHEUMATOLOGY | Facility: CLINIC | Age: 59
End: 2019-01-29

## 2019-01-29 DIAGNOSIS — M84.352D STRESS FRACTURE OF LEFT FEMUR WITH ROUTINE HEALING, SUBSEQUENT ENCOUNTER: ICD-10-CM

## 2019-01-29 DIAGNOSIS — M25.552 PAIN IN LEFT HIP: Primary | ICD-10-CM

## 2019-01-29 NOTE — TELEPHONE ENCOUNTER
Documentation Only:    Prior Authorization for Dom has been approved for one year    Approval dates: 1/29/2019 through 1/28/2020    Patient co-pay: $70.00    Researching possible co-pay assistance.

## 2019-01-29 NOTE — PROGRESS NOTES
Returned pt phone call from this am. Pt. Reports she has been having severe pain in her left hip and she can barely walk. She states she has been using a cane in the right hand for support when walking. She has had MRI and is scheduled for CT scan on 01/31/2019. She is being followed by Dr. Moran with Rheumatology. She will call back once she has some resolution in hip pain and can come in for outpt therapy for her elbow.     Clare Cruz, OTGRACIELA, OTR/L, CHT   Occupational therapist, Certified Hand Therapist

## 2019-01-31 ENCOUNTER — PATIENT MESSAGE (OUTPATIENT)
Dept: RHEUMATOLOGY | Facility: CLINIC | Age: 59
End: 2019-01-31

## 2019-01-31 ENCOUNTER — TELEPHONE (OUTPATIENT)
Dept: RHEUMATOLOGY | Facility: CLINIC | Age: 59
End: 2019-01-31

## 2019-02-01 ENCOUNTER — TELEPHONE (OUTPATIENT)
Dept: OPTOMETRY | Facility: CLINIC | Age: 59
End: 2019-02-01

## 2019-02-04 ENCOUNTER — PATIENT MESSAGE (OUTPATIENT)
Dept: RHEUMATOLOGY | Facility: CLINIC | Age: 59
End: 2019-02-04

## 2019-02-04 ENCOUNTER — TELEPHONE (OUTPATIENT)
Dept: RHEUMATOLOGY | Facility: CLINIC | Age: 59
End: 2019-02-04

## 2019-02-04 ENCOUNTER — OFFICE VISIT (OUTPATIENT)
Dept: ORTHOPEDICS | Facility: CLINIC | Age: 59
End: 2019-02-04
Payer: MEDICARE

## 2019-02-04 VITALS
BODY MASS INDEX: 27.95 KG/M2 | HEART RATE: 98 BPM | SYSTOLIC BLOOD PRESSURE: 124 MMHG | HEIGHT: 63 IN | DIASTOLIC BLOOD PRESSURE: 79 MMHG | WEIGHT: 157.75 LBS

## 2019-02-04 DIAGNOSIS — M25.552 LEFT HIP PAIN: Primary | ICD-10-CM

## 2019-02-04 DIAGNOSIS — M84.353A STRESS FRACTURE OF NECK OF FEMUR, INITIAL ENCOUNTER: ICD-10-CM

## 2019-02-04 PROCEDURE — 3008F PR BODY MASS INDEX (BMI) DOCUMENTED: ICD-10-PCS | Mod: CPTII,S$GLB,, | Performed by: PHYSICIAN ASSISTANT

## 2019-02-04 PROCEDURE — 99999 PR PBB SHADOW E&M-EST. PATIENT-LVL V: ICD-10-PCS | Mod: PBBFAC,,, | Performed by: PHYSICIAN ASSISTANT

## 2019-02-04 PROCEDURE — 99213 PR OFFICE/OUTPT VISIT, EST, LEVL III, 20-29 MIN: ICD-10-PCS | Mod: S$GLB,,, | Performed by: PHYSICIAN ASSISTANT

## 2019-02-04 PROCEDURE — 99999 PR PBB SHADOW E&M-EST. PATIENT-LVL V: CPT | Mod: PBBFAC,,, | Performed by: PHYSICIAN ASSISTANT

## 2019-02-04 PROCEDURE — 3008F BODY MASS INDEX DOCD: CPT | Mod: CPTII,S$GLB,, | Performed by: PHYSICIAN ASSISTANT

## 2019-02-04 PROCEDURE — 99213 OFFICE O/P EST LOW 20 MIN: CPT | Mod: S$GLB,,, | Performed by: PHYSICIAN ASSISTANT

## 2019-02-04 RX ORDER — HYDROCODONE BITARTRATE AND ACETAMINOPHEN 10; 325 MG/1; MG/1
1 TABLET ORAL 3 TIMES DAILY
Qty: 21 TABLET | Refills: 0 | Status: SHIPPED | OUTPATIENT
Start: 2019-02-04 | End: 2019-02-19

## 2019-02-04 NOTE — PROGRESS NOTES
Subjective:      Patient ID: Joyce Peterson is a 58 y.o. female.    Chief Complaint: Pain of the Left Hip    HPI  58 year old female presents with chief complaint of left hip pain x 2.5 weeks. She denies trauma. Pain is at the groin. It is worse with walking. She has taken naproxen, prednisone, and tramadol with no relief. She reports LBP. She is ambulating with a cane which is new.   Review of Systems   Constitution: Negative for chills, fever and night sweats.   Cardiovascular: Negative for chest pain.   Respiratory: Negative for cough and shortness of breath.    Hematologic/Lymphatic: Does not bruise/bleed easily.   Skin: Negative for color change.   Gastrointestinal: Negative for heartburn.   Genitourinary: Negative for dysuria.   Neurological: Negative for numbness and paresthesias.   Psychiatric/Behavioral: Negative for altered mental status.   Allergic/Immunologic: Negative for persistent infections.         Objective:            General    Vitals reviewed.  Constitutional: She is oriented to person, place, and time. She appears well-developed and well-nourished.   Cardiovascular: Normal rate.    Neurological: She is alert and oriented to person, place, and time.         Left Hip Exam     Tenderness   The patient tender to palpation of the trochanteric bursa.    Range of Motion   The patient has normal left hip ROM.    Tests   Stinchfield test: positive  Log Roll: negative    Other   Sensation: normal    Comments:  Pain with internal rotation.           Vascular Exam       Left Pulses  Dorsalis Pedis:      2+              X-ray: reviewed by myself. Mild DJD.  No fracture or dislocation.  No bone destruction identified.    MRI: Mild marrow edema along the medial base of the left femoral neck, suggesting stress reaction/early stress fracture.  No discrete fracture line at this time.  Mild cartilage loss/ osteoarthritis of the hip.  Insertional tendinosis at the greater trochanter.  Probable nondisplaced  labral tear.    CT: The visualized intraabdominal content is unremarkable.  There is degenerative change visualized within the lower lumbar spine including vacuum disc phenomena at L5-S1.  The hip joint space is satisfactorily preserved.  There is osseous spur at the greater trochanter.  There is no fracture, dislocation, or bony erosion.  Specifically there is no fracture along the medial base of the left femoral neck within area of abnormal edematous signal seen on MRI dated 01/28/2019.      Assessment:       Encounter Diagnoses   Name Primary?    Left hip pain Yes    Stress fracture of neck of femur, initial encounter           Plan:       Discussed case with Dr. Hodge/ Dr. Duff. Recommend TTWB x 4-6 weeks. She already has a walker and wheelchair. Norco 10 mg prescribed. Prescription for shower chair was sent. Explained the importance of compliance due to risk of displaced fracture and surgical intervention. If she develops increased pain, she will contact clinic immediately. She wants to f/u with Dr. Duff and also get set up with the osteoporosis/fracture clinic. Message will be sent to Dr. Duff's nurse.

## 2019-02-04 NOTE — LETTER
February 4, 2019      Isiah Moran MD  [address]  [facility] [address]           [facility]oped[facility]  [address]  [facility] [address]  Phone: [phone]          Patient: Joyce Peterson   MR Number: [id number]   YOB: 1960   Date of Visit: 2/4/2019       Dear Dr. Isiah Moran:    Thank you for referring Joyce Peterson to me for evaluation. Attached you will find relevant portions of my assessment and plan of care.    If you have questions, please do not hesitate to call me. I look forward to following Joyce Peterson along with you.    Sincerely,    Joyce May PA-C    Enclosure  CC:  No Recipients    If you would like to receive this communication electronically, please contact [email or web address] or [phone] to request more information on [employer] access.    For providers and/or their staff who would like to refer a patient to [facility], please contact us through our one-stop-shop provider referral line, [facility], at [phone].    If you feel you have received this communication in error or would no longer like to receive these types of communications, please e-mail [email or web address]

## 2019-02-05 ENCOUNTER — TELEPHONE (OUTPATIENT)
Dept: RHEUMATOLOGY | Facility: CLINIC | Age: 59
End: 2019-02-05

## 2019-02-05 ENCOUNTER — PATIENT MESSAGE (OUTPATIENT)
Dept: RHEUMATOLOGY | Facility: CLINIC | Age: 59
End: 2019-02-05

## 2019-02-05 RX ORDER — FLUCONAZOLE 100 MG/1
100 TABLET ORAL DAILY
Qty: 14 TABLET | Refills: 3 | Status: SHIPPED | OUTPATIENT
Start: 2019-02-05 | End: 2019-06-25 | Stop reason: SDUPTHER

## 2019-02-06 ENCOUNTER — PATIENT MESSAGE (OUTPATIENT)
Dept: RHEUMATOLOGY | Facility: CLINIC | Age: 59
End: 2019-02-06

## 2019-02-06 ENCOUNTER — DOCUMENTATION ONLY (OUTPATIENT)
Dept: OPHTHALMOLOGY | Facility: CLINIC | Age: 59
End: 2019-02-06

## 2019-02-06 DIAGNOSIS — H02.9 EYELID DISORDER: Primary | ICD-10-CM

## 2019-02-06 RX ORDER — NEOMYCIN SULFATE, POLYMYXIN B SULFATE, AND DEXAMETHASONE 3.5; 10000; 1 MG/G; [USP'U]/G; MG/G
OINTMENT OPHTHALMIC
Qty: 3.5 G | Refills: 0 | Status: SHIPPED | OUTPATIENT
Start: 2019-02-06 | End: 2019-05-20

## 2019-02-06 NOTE — PROGRESS NOTES
Assessment /Plan     For exam results, see Encounter Report.    Eyelid disorder         Refer to previous optometry encounter notes.  Received message from [facility] pharmacy notifying me of backorder status of FML ophthalmic ointment.    Advised pharmacy to substitute Maxitrol ophthalmic ointment (or generic equivalent) for application to the affected external eyelid tissue one or two times per day for ten days.    Patrice Jurado, OD

## 2019-02-08 ENCOUNTER — PATIENT MESSAGE (OUTPATIENT)
Dept: RHEUMATOLOGY | Facility: CLINIC | Age: 59
End: 2019-02-08

## 2019-02-08 DIAGNOSIS — M06.9 RHEUMATOID ARTHRITIS, INVOLVING UNSPECIFIED SITE, UNSPECIFIED RHEUMATOID FACTOR PRESENCE: ICD-10-CM

## 2019-02-08 RX ORDER — LEFLUNOMIDE 20 MG/1
TABLET ORAL
Qty: 30 TABLET | Refills: 0 | Status: SHIPPED | OUTPATIENT
Start: 2019-02-08 | End: 2019-03-08 | Stop reason: SDUPTHER

## 2019-02-11 ENCOUNTER — TELEPHONE (OUTPATIENT)
Dept: RHEUMATOLOGY | Facility: CLINIC | Age: 59
End: 2019-02-11

## 2019-02-11 ENCOUNTER — TELEPHONE (OUTPATIENT)
Dept: ORTHOPEDICS | Facility: CLINIC | Age: 59
End: 2019-02-11

## 2019-02-11 ENCOUNTER — PATIENT MESSAGE (OUTPATIENT)
Dept: RHEUMATOLOGY | Facility: CLINIC | Age: 59
End: 2019-02-11

## 2019-02-11 DIAGNOSIS — D84.9 IMMUNOSUPPRESSED STATUS: Primary | ICD-10-CM

## 2019-02-11 DIAGNOSIS — M25.552 LEFT HIP PAIN: Primary | ICD-10-CM

## 2019-02-11 NOTE — TELEPHONE ENCOUNTER
----- Message from Cece Cabrera MA sent at 2/11/2019  9:40 AM CST -----  Contact: SELF      ----- Message -----  From: Radha Vuong  Sent: 2/11/2019   9:26 AM  To: Sherin HARRINGTON Staff    Pt called requesting a return call back from [facility] regarding an appt for fractured left hip ,Oestoathritis. Pt saw Joyce May on 02/04 and referred her to see Sherin. Pt could be reached at [phone]

## 2019-02-11 NOTE — TELEPHONE ENCOUNTER
Spoke with pt.  Scheduled an appointment for follow up with Dr Duff.  Advised pt that she will be contacted regarding an appointment with the fragility clinic.  Pt verbalized understanding.

## 2019-02-13 ENCOUNTER — PATIENT MESSAGE (OUTPATIENT)
Dept: RHEUMATOLOGY | Facility: CLINIC | Age: 59
End: 2019-02-13

## 2019-02-14 ENCOUNTER — LAB VISIT (OUTPATIENT)
Dept: LAB | Facility: HOSPITAL | Age: 59
End: 2019-02-14
Attending: INTERNAL MEDICINE
Payer: MEDICARE

## 2019-02-14 ENCOUNTER — IMMUNIZATION (OUTPATIENT)
Dept: PHARMACY | Facility: CLINIC | Age: 59
End: 2019-02-14
Payer: MEDICARE

## 2019-02-14 DIAGNOSIS — D84.9 IMMUNOSUPPRESSED STATUS: ICD-10-CM

## 2019-02-14 PROCEDURE — 86765 RUBEOLA ANTIBODY: CPT

## 2019-02-14 PROCEDURE — 36415 COLL VENOUS BLD VENIPUNCTURE: CPT

## 2019-02-15 ENCOUNTER — PATIENT MESSAGE (OUTPATIENT)
Dept: RHEUMATOLOGY | Facility: CLINIC | Age: 59
End: 2019-02-15

## 2019-02-16 LAB
RUBEOLA IGG ANTIBODY: 2.01 ISR
RUBEOLA INTERPRETATION: POSITIVE

## 2019-02-18 ENCOUNTER — PATIENT MESSAGE (OUTPATIENT)
Dept: RHEUMATOLOGY | Facility: CLINIC | Age: 59
End: 2019-02-18

## 2019-02-19 ENCOUNTER — PATIENT MESSAGE (OUTPATIENT)
Dept: ORTHOPEDICS | Facility: CLINIC | Age: 59
End: 2019-02-19

## 2019-02-19 ENCOUNTER — TELEPHONE (OUTPATIENT)
Dept: PHARMACY | Facility: CLINIC | Age: 59
End: 2019-02-19

## 2019-02-19 DIAGNOSIS — M84.353A STRESS FRACTURE OF NECK OF FEMUR, INITIAL ENCOUNTER: Primary | ICD-10-CM

## 2019-02-19 NOTE — TELEPHONE ENCOUNTER
Initial Kevzara Syringe consult completed on . Kevzara will be shipped on  to arrive at patient's home on  via [employer]. $70 copay.  Patient plans to start Kevzara on . Address confirmed, CC on file. Confirmed 2 patient identifiers - name and . Therapy Appropriate.     Counseled patient on administration directions:  Inject Kevzara 200mg/1.14ml Syringe (1 syringe ) into the skin every 14 days.  - Take out the refrigerator 30-60 minutes prior to injection.   - Wash hands before and after injection.  - Monthly RX will come with gauze, bandaids, and alcohol swabs.  - Patient may inject in either the tops of the thighs, abdomen- but at least 2 inches away from her belly button, or the outer part of her upper arm. Patient was instructed to rotate injections sites.  - Patient is to wipe down the injection site with the alcohol pad, wait to dry. Gently squeeze the area of the cleaned skin and hold it firmly. Insert the needle into the skin at a 45 degree angle, release the skin and push down on the plunger to administer the dose.   - Place the entire syringe into the sharps container. Once the container is full, per [place] law, she may lock the sharps container and place in her trash. Shecan then contact the Pharmacy and we will replace the sharps at no additional charge.     Patient was counseled on possible side effects:  - Injection site reaction: redness, soreness, itching, bruising, which should resolve within 3-5 days.  - Increased risk for infections. If patient becomes sick, patient is to hold Actemra use until she is better.   -Common cold symptoms     Patient verbalized understanding. Compliance stressed. Patient advised to keep a calendar marking dates of injections to ensure better compliance. Patient advised to call myself or provider should any questions arise. Patient plans to start Kevzara on . Consultation included: indication; goals of treatment; administration; storage and handling; side  effects; how to handle side effects; the importance of compliance; how to handle missed doses; the importance of laboratory monitoring; the importance of keeping all follow up appointments. Patient understands to report any medication changes to [facility] and provider. All questions answered and addressed to patients satisfaction. I will f/u with her in 1 week from start, [facility] to contact patient in 3 weeks for refills.

## 2019-02-26 ENCOUNTER — PATIENT MESSAGE (OUTPATIENT)
Dept: RHEUMATOLOGY | Facility: CLINIC | Age: 59
End: 2019-02-26

## 2019-02-28 ENCOUNTER — OFFICE VISIT (OUTPATIENT)
Dept: ORTHOPEDICS | Facility: CLINIC | Age: 59
End: 2019-02-28
Payer: MEDICARE

## 2019-02-28 ENCOUNTER — LAB VISIT (OUTPATIENT)
Dept: LAB | Facility: HOSPITAL | Age: 59
End: 2019-02-28
Attending: INTERNAL MEDICINE
Payer: MEDICARE

## 2019-02-28 VITALS
DIASTOLIC BLOOD PRESSURE: 82 MMHG | HEIGHT: 63 IN | SYSTOLIC BLOOD PRESSURE: 127 MMHG | HEART RATE: 98 BPM | BODY MASS INDEX: 26.78 KG/M2 | WEIGHT: 151.13 LBS

## 2019-02-28 DIAGNOSIS — M84.353A STRESS FRACTURE OF NECK OF FEMUR, INITIAL ENCOUNTER: ICD-10-CM

## 2019-02-28 DIAGNOSIS — Z79.899 ENCOUNTER FOR MONITORING LEFLUNOMIDE THERAPY: ICD-10-CM

## 2019-02-28 DIAGNOSIS — M80.80XA PATHOLOGICAL FRACTURE DUE TO OSTEOPOROSIS, UNSPECIFIED FRACTURE SITE, UNSPECIFIED OSTEOPOROSIS TYPE, INITIAL ENCOUNTER: Primary | ICD-10-CM

## 2019-02-28 DIAGNOSIS — Z51.81 ENCOUNTER FOR MONITORING LEFLUNOMIDE THERAPY: ICD-10-CM

## 2019-02-28 DIAGNOSIS — M06.9 RHEUMATOID ARTHRITIS, INVOLVING UNSPECIFIED SITE, UNSPECIFIED RHEUMATOID FACTOR PRESENCE: ICD-10-CM

## 2019-02-28 DIAGNOSIS — M81.0 OSTEOPOROSIS, UNSPECIFIED OSTEOPOROSIS TYPE, UNSPECIFIED PATHOLOGICAL FRACTURE PRESENCE: ICD-10-CM

## 2019-02-28 DIAGNOSIS — M81.6 LOCALIZED OSTEOPOROSIS OF LEQUESNE: ICD-10-CM

## 2019-02-28 LAB
ALBUMIN SERPL BCP-MCNC: 3.8 G/DL
ALP SERPL-CCNC: 112 U/L
ALT SERPL W/O P-5'-P-CCNC: 25 U/L
ANION GAP SERPL CALC-SCNC: 9 MMOL/L
AST SERPL-CCNC: 26 U/L
BASOPHILS # BLD AUTO: 0.04 K/UL
BASOPHILS NFR BLD: 1 %
BILIRUB SERPL-MCNC: 0.4 MG/DL
BUN SERPL-MCNC: 15 MG/DL
CALCIUM SERPL-MCNC: 9.2 MG/DL
CHLORIDE SERPL-SCNC: 106 MMOL/L
CO2 SERPL-SCNC: 24 MMOL/L
CREAT SERPL-MCNC: 0.8 MG/DL
CRP SERPL-MCNC: 0.5 MG/L
DIFFERENTIAL METHOD: ABNORMAL
EOSINOPHIL # BLD AUTO: 0 K/UL
EOSINOPHIL NFR BLD: 0.3 %
ERYTHROCYTE [DISTWIDTH] IN BLOOD BY AUTOMATED COUNT: 14 %
ERYTHROCYTE [SEDIMENTATION RATE] IN BLOOD BY WESTERGREN METHOD: 12 MM/HR
EST. GFR  (AFRICAN AMERICAN): >60 ML/MIN/1.73 M^2
EST. GFR  (NON AFRICAN AMERICAN): >60 ML/MIN/1.73 M^2
GLUCOSE SERPL-MCNC: 100 MG/DL
HCT VFR BLD AUTO: 44 %
HGB BLD-MCNC: 14.1 G/DL
IMM GRANULOCYTES # BLD AUTO: 0.01 K/UL
IMM GRANULOCYTES NFR BLD AUTO: 0.3 %
LYMPHOCYTES # BLD AUTO: 1 K/UL
LYMPHOCYTES NFR BLD: 25.1 %
MCH RBC QN AUTO: 28.7 PG
MCHC RBC AUTO-ENTMCNC: 32 G/DL
MCV RBC AUTO: 89 FL
MONOCYTES # BLD AUTO: 0.5 K/UL
MONOCYTES NFR BLD: 14 %
NEUTROPHILS # BLD AUTO: 2.3 K/UL
NEUTROPHILS NFR BLD: 59.3 %
NRBC BLD-RTO: 0 /100 WBC
PLATELET # BLD AUTO: 318 K/UL
PMV BLD AUTO: 10.1 FL
POTASSIUM SERPL-SCNC: 3.9 MMOL/L
PROT SERPL-MCNC: 7 G/DL
RBC # BLD AUTO: 4.92 M/UL
SODIUM SERPL-SCNC: 139 MMOL/L
WBC # BLD AUTO: 3.86 K/UL

## 2019-02-28 PROCEDURE — 85025 COMPLETE CBC W/AUTO DIFF WBC: CPT

## 2019-02-28 PROCEDURE — 99214 PR OFFICE/OUTPT VISIT, EST, LEVL IV, 30-39 MIN: ICD-10-PCS | Mod: 25,S$GLB,, | Performed by: PHYSICIAN ASSISTANT

## 2019-02-28 PROCEDURE — 99214 OFFICE O/P EST MOD 30 MIN: CPT | Mod: 25,S$GLB,, | Performed by: PHYSICIAN ASSISTANT

## 2019-02-28 PROCEDURE — 3008F BODY MASS INDEX DOCD: CPT | Mod: CPTII,S$GLB,, | Performed by: PHYSICIAN ASSISTANT

## 2019-02-28 PROCEDURE — 85652 RBC SED RATE AUTOMATED: CPT

## 2019-02-28 PROCEDURE — 99999 PR PBB SHADOW E&M-EST. PATIENT-LVL V: ICD-10-PCS | Mod: PBBFAC,,, | Performed by: PHYSICIAN ASSISTANT

## 2019-02-28 PROCEDURE — 80053 COMPREHEN METABOLIC PANEL: CPT

## 2019-02-28 PROCEDURE — 99999 PR PBB SHADOW E&M-EST. PATIENT-LVL V: CPT | Mod: PBBFAC,,, | Performed by: PHYSICIAN ASSISTANT

## 2019-02-28 PROCEDURE — 3008F PR BODY MASS INDEX (BMI) DOCUMENTED: ICD-10-PCS | Mod: CPTII,S$GLB,, | Performed by: PHYSICIAN ASSISTANT

## 2019-02-28 PROCEDURE — 86140 C-REACTIVE PROTEIN: CPT

## 2019-02-28 NOTE — PROGRESS NOTES
"  SUBJECTIVE:     Chief Complaint & History of Present Illness:  Joyce Peterson is a new patient 58 y.o. female who is seen here today for a bone health evaluation for osteoporosis, fracture prevention, and risk evaluation for future fractures.        she was appropriately identified and referred by López Duff MD due to concerns for compromised bone quality, and risk of future fractures.  This visit is medically necessary to identify risk, investigate and initiative treatment as appropriate to improve bone quality and strength for the reduction of secondary fractures.     her medical history, medications and fracture history will be reviewed and summarized      Review of patient's allergies indicates:   Allergen Reactions    Actemra [tocilizumab] Other (See Comments)     Severe dizziness    Codeine Nausea And Vomiting    Gold au 198 Hives and Rash    Hydroxychloroquine Other (See Comments)     Can't remember the reaction      Iodinated contrast- oral and iv dye Other (See Comments)     Other reaction(s): BURNING ALL OVER    Iodine Other (See Comments)     Other reaction(s): BURNING ALL OVER - Iodine dye - Not topical    Sulfa (sulfonamide antibiotics) Other (See Comments)     Can't remember the reaction    Zofran [ondansetron hcl (pf)] Nausea And Vomiting     Pt reports that last time she received zofran she started vomiting again    Methotrexate analogues Nausea Only    Pneumovax 23 [pneumococcal 23-argenis ps vaccine] Other (See Comments)     "sick"         Current Outpatient Medications   Medication Sig Dispense Refill    aspirin 81 mg Tab Take 81 mg by mouth every morning.       azelastine (ASTELIN) 137 mcg nasal spray 1 spray (137 mcg total) by Nasal route 2 (two) times daily. (Patient taking differently: 1 spray by Nasal route 2 (two) times daily as needed. ) 30 mL 11    azithromycin (Z-DEVON) 250 MG tablet Take 2 tablets by mouth on day 1; Take 1 tablet by mouth on days 2-5 6 tablet 0    " budesonide-formoterol 160-4.5 mcg (SYMBICORT) 160-4.5 mcg/actuation HFAA Inhale 2 puffs into the lungs every 12 (twelve) hours. 3 Inhaler 3    calcium citrate-vitamin D (CITRACAL + D) 315-200 mg-unit per tablet Take 1 tablet by mouth 2 (two) times daily.       diazePAM (VALIUM) 5 MG tablet Take once for MRI 1 tablet 0    diclofenac sodium (VOLTAREN) 1 % Gel Apply 2 g topically 4 (four) times daily as needed. 500 g 5    docusate sodium (COLACE) 100 MG capsule Take 1 capsule (100 mg total) by mouth 3 (three) times daily. 90 capsule 0    fish oil-omega-3 fatty acids 300-1,000 mg capsule Take 1,400 g by mouth once daily.      flaxseed oil 1,000 mg Cap Take by mouth once daily.      flu vac [id number] 36mos up,PF, 60 mcg (15 mcg x 4)/0.5 mL Syrg To be administered by Pharm D 0.5 mL 0    fluconazole (DIFLUCAN) 100 MG tablet Take 100 mg by mouth once daily.  3    fluticasone (FLONASE) 50 mcg/actuation nasal spray       GUAIFENESIN (MUCINEX ORAL) Take 400 mg by mouth every 4 (four) hours as needed.       INV PROPULSID 10 MG Take 20 mg by mouth 4 (four) times daily. FOR INVESTIGATIONAL USE ONLY. Protocol [id number] each 0    leflunomide (ARAVA) 20 MG Tab TAKE 1 TABLET BY MOUTH EVERY DAY 30 tablet 0    montelukast (SINGULAIR) 10 mg tablet Take 1 tablet (10 mg total) by mouth nightly. 90 tablet 3    naproxen (NAPROSYN) 500 MG tablet TAKE 1 TABLET TWICE A DAY AS NEEDED 180 tablet 1    neomycin-polymyxin-dexamethasone (DEXACINE) 3.5 mg/g-10,000 unit/g-0.1 % Oint Apply to affected external eyelid tissue one or two times per day for ten days 3.5 g 0    omeprazole (PRILOSEC) 40 MG capsule TAKE ONE CAPSULE BY MOUTH EVERY MORNING 30 capsule 6    omeprazole-sodium bicarbonate (ZEGERID) 40-1.1 mg-gram per capsule TAKE 1 CAPSULE BY MOUTH EVERY MORNING. 90 capsule 3    polyethylene glycol (GLYCOLAX) 17 gram/dose powder Mix 1 capful (17 grams total) with liquid and take by mouth once daily. 1581 g 0    POTASSIUM  "ORAL Take by mouth once daily.      predniSONE (DELTASONE) 1 MG tablet Take 3 mg by mouth once daily.  1    predniSONE (DELTASONE) 5 MG tablet Take 5 mg by mouth once daily. 7 mg daily  0    PREMARIN 0.625 mg tablet Take 0.625 mg by mouth once daily.  4    progesterone (PROMETRIUM) 100 MG capsule Take 100 mg by mouth every morning. 1 Capsule Oral Every day, morning      PROVENTIL HFA 90 mcg/actuation inhaler INHALE 2 PUFFS EVERY 6 HOURS AS NEEDED 20.1 Inhaler 0    RESTASIS 0.05 % ophthalmic emulsion PLACE 0.4 MLS (1 DROP TOTAL) INTO BOTH EYES 2 (TWO) TIMES DAILY.  5    rizatriptan (MAXALT) 10 MG tablet Take 10 mg by mouth as needed for Migraine.       rosuvastatin (CRESTOR) 20 MG tablet TAKE 1 TABLET EVERY DAY (Patient taking differently: TAKE 1 TABLET EVERY evening) 90 tablet 3    sarilumab (KEVZARA) 200 mg/1.14 mL PnIj Inject 1.14 mLs (200 mg total) into the skin every 14 (fourteen) days. 2 pen 2    sarilumab (KEVZARA) 200 mg/1.14 mL Syrg Inject 1.14 mLs (200 mg total) into the skin every 14 (fourteen) days. 2.28 mL 2    sucralfate (CARAFATE) 1 gram tablet TAKE 1 TABLET (1 G TOTAL) BY MOUTH 4 (FOUR) TIMES DAILY. 360 tablet 3    syringe with needle (TUBERCULIN SYRINGE) 1 mL 27 x 1/2" Syrg To administer methotrexate 7.5mg sc once a week 12 Syringe 3    traMADol (ULTRAM) 50 mg tablet Take 1 tablet (50 mg total) by mouth every 6 (six) hours. 28 tablet 0    trospium (SANCTURA) 20 mg Tab tablet Take 1 tablet (20 mg total) by mouth 2 (two) times daily. 60 tablet 11    valACYclovir (VALTREX) 1000 MG tablet TAKE 1 TABLET BY MOUTH TWICE A DAY AS NEEDED 20 tablet 3    varicella-zoster gE-AS01B, PF, (SHINGRIX, PF,) 50 mcg/0.5 mL injection Inject into the muscle. 0.5 mL 0     No current facility-administered medications for this visit.      Facility-Administered Medications Ordered in Other Visits   Medication Dose Route Frequency Provider Last Rate Last Dose    0.9%  NaCl infusion   Intravenous Continuous " Callum Singer MD   Stopped at 10/17/18 1058       Past Medical History:   Diagnosis Date    Acid reflux     Allergy     Anemia     Asthma     Coronary artery disease     Degenerative disc disease     Dry eyes     Dry mouth     Gastroparesis     Hyperlipidemia     Lateral meniscus derangement 4/6/2016    Lobular carcinoma in situ     Osteoarthritis     Osteoporosis     Rheumatoid arthritis(714.0)     Umbilical hernia 8/13/2015       Past Surgical History:   Procedure Laterality Date    ARTHROSCOPY-KNEE Right 4/6/2016    Performed by [facility] Jr[facility] at [facility] OR 2ND FLR    BREAST BIOPSY Left 01/29/2002    core bx    CARPAL TUNNEL RELEASE Right 05/2017    CHOLECYSTECTOMY  2004    COLONOSCOPY      10/11    COLONOSCOPY N/A 6/29/2017    Performed by López Moore MD at [facility] ENDO (4TH FLR)    ESOPHAGOGASTRODUODENOSCOPY (EGD) N/A 6/29/2017    Performed by L[facility]pez Moore [facility] at [facility] ENDO (4TH FLR)    RELEASE-CARPAL TUNNEL / Right Right 5/29/2017    [facility]    REPAIR, TENDON, TRICEPS left elbow Left 10/17/2018    Performed by [facility]arbrough [facility] at [facility] OR 1ST FLR    REPAIR-HERNIA-UMBILICAL N/A 8/13/2015    Performed by Ricardo Small MD at [facility] OR 2ND FLR    TONSILLECTOMY      TUBAL LIGATION  2003    UPPER GASTROINTESTINAL ENDOSCOPY      10/11    uterine ablation  2003       Lab Results   Component Value Date     11/13/2018    K 3.7 11/13/2018     11/13/2018    CO2 26 11/13/2018     11/13/2018    BUN 14 11/13/2018    CREATININE 0.8 11/13/2018    CALCIUM 9.2 11/13/2018    PROT 6.6 11/13/2018    ALBUMIN 3.7 11/13/2018    BILITOT 0.4 11/13/2018    ALKPHOS 75 11/13/2018    AST 29 11/13/2018    ALT 28 11/13/2018    ANIONGAP 8 11/13/2018    ESTGFRAFRICA >60.0 11/13/2018    EGFRNONAA >60.0 11/13/2018      Lab Results   Component Value Date    WBC 7.21 11/13/2018    RBC 4.40 11/13/2018    HGB 13.4 11/13/2018    HCT 41.6  11/13/2018    MCV 95 11/13/2018    MCH 30.5 11/13/2018    MCHC 32.2 11/13/2018    RDW 13.8 11/13/2018     11/13/2018    MPV 11.1 11/13/2018    GRAN 5.2 11/13/2018    GRAN 71.4 11/13/2018    LYMPH 1.2 11/13/2018    LYMPH 17.1 (L) 11/13/2018    MONO 0.7 11/13/2018    MONO 10.0 11/13/2018    EOS 0.0 11/13/2018    BASO 0.06 11/13/2018    EOSINOPHIL 0.4 11/13/2018    BASOPHIL 0.8 11/13/2018    DIFFMETHOD Automated 11/13/2018      Lab Results   Component Value Date    MG 2.2 10/15/2018      Lab Results   Component Value Date    PHOS 2.4 (L) 10/15/2018      Lab Results   Component Value Date    FZESGKRN42CS 38 08/07/2018      Lab Results   Component Value Date    PTH 89.0 (H) 08/07/2018      Lab Results   Component Value Date    TSH 0.504 08/07/2018    TSH 0.504 08/07/2018      Lab Results   Component Value Date    FREET4 0.98 05/16/2018      Lab Results   Component Value Date    HGBA1C 6.0 02/06/2017    ESTIMATEDAVG 126 02/06/2017      No results found for: FREETESTOSTE         Vital Signs (Most Recent)  There were no vitals filed for this visit.      Today's Visit and Bone Health History     Have you had any loss of height or gotten shorter since your 20's?  0   Are you postmenopausal?  Naturally   Please indicate at what age you became postmenopausal. 44   Have you ever taken any type of hormone replacement therapy? Yes   If you have had hormone replacement therapy please indicate which hormone therapy was used and for how long. 2005   Do you currently smoke? No   Have you ever smoked in the past? No   How many alcoholic beverages do you drink per day? 1 to 3   How many caffeinated beverages do you drink per day? more than 3   Have you had 2 or more falls in the past 12 months? No   How active have you been in the past 12 months? Not active ( in the house only )   Did either of your parents have a hip fracture after the age of 50? No   Have you ever been diagnosed with any of the following? Rheumatoid Arthritis     Gastroesophageal Reflux    Fracture    GERD   Do you currently have a fracture? Yes   If you currently have a fracture please indicate where and when the fracture occured. femur// 4to 6 wks ago   Have you broken any other bones since you turned 50 or older? Yes   Please list all bones broken since you turned 50 or older. both feet   Have you ever had a bone density test or DXA scan? Yes   Are you currently taking or have you ever taken any of the following medications? Estrogen / Hormone Therapy    Prolia (Denosumab)    Steroids (Prednisone, Cortisone)   Do you take Calcium? Yes   If you take Calcium what dose? 2 citracal +D EVERY  DAY   Do you take Vitamin D? No   If you take Vitamin D what dose? na   Have you ever been diagnosed with cancer? No   Please indicate what type of cancer and when you were diagnosed. na   Have you ever been treated for cancer with high beam radiation or had radioactive implants? No   If you have been treated for cancer with high beam radiation or had radioactive implants please indicate what type.       she has medical history and medication use known to be associated with bone loss and osteoporosis to include pathological femur fracture, asthma, inhaled steroids, history of anemia.  PP ice, opioid pain medications, rheumatoid arthritis previous diagnosis of osteoporosis    The last DXA was performed in 02/16/2017.          T-Score Hip: -0.6     T-Score Spine: -2.1      FRAX:      Major Fx.: 12%         Hip Fx.: 0.9%      Review of Systems:  ROS:  Constitutional: no fever or chills  Eyes: no visual changes  ENT: no nasal congestion or sore throat  Respiratory: no respiratory symptoms and Positive for asthma  Cardiovascular: no chest pain or palpitations, Positive CAD  Gastrointestinal: no nausea or vomiting, tolerating diet, Positive GERD, gastroparesis  Genitourinary: no hematuria or dysuria  Integument/Breast: no rash or pruritis  Hematologic/Lymphatic: no easy bruising or  lymphadenopathy, Sjogren's syndrome lobular carcinoma in situ, iron deficiency anemia, immunosuppression  Musculoskeletal: no arthralgias or myalgias, Positive RA  Neurological: no seizures or tremors, Degenerative disc disease, lumbar spondylosis, cervical spondylosis,  Behavioral/Psych: no auditory or visual hallucinations  Endocrine: no heat or cold intolerance      OBJECTIVE:     PHYSICAL EXAM:     ,                  General: no acute distress  Behavioral/Psych: normal judgment and insight  Skin: no rash  Head/Neck: atraumatic, normocephalic, without obvious abnormality  Respiratory: normal respiratory effort  Cardiac: regular rate and rhythm  Vascular: pulses present  Abdomen: soft, non-tender  Musculoskeletal: no joint tenderness, deformity or swelling               ASSESSMENT/PLAN:     Assessment:    Osteoporosis, at high risk for future fractures, history of pathological femur fracture, previous pre of osteoporosis multiple foot fracture.   Asthma on inhaled and injectable steroids, PPIs, opioid pain medications,    Plan:   30-45 minutes spent in face-to-face consultation with patient and her family members today, discussing the disease management of osteoporosis, for the reduction of future fractures.  I have explained that bone strength is equal to bone quality. A bone density / DEXA scan is important to complement her care since her fracture was by definition a fragility fracture/traumatic fracture.  Over half of the encounter was spent (50%) counseling the patient on the disease of osteoporosis evidence-based and best practice treatment options available as well as recommendations for improvement of bone quality, the importance of nutritional supplements to include:  Calcium 1766-2546 mg daily in divided doses   Vitamin D3  6070-0568 units daily in divided doses.   Fall prevention and personal safety for the reduction of future fractures.    Clinicians Guidelines for the treatment of Osteoporosis 2017 as  part of the National Osteoporosis Foundation were utilized as the evidence-based information provided.    Discussed pharmaceutical treatment options to include Biphosphatases, Denosumab, Abaloparatide and Teriparatide. Information to include indications for therapy, risks and benefits to treatment, and important safety information related to these treatments was provided and discussed.  Handouts were given to the patient for her review on osteoporosis and other pharmacological treatment information related to other available treatment options.    The importance of diet, impact exercise, and core strengthening with gait and balance exercise, through  both formal physical therapy programs and home exercise programs was discussed.     Bone density test recommended and ordered  Bone health labs recommended and ordered    Will see her back following testing to discuss treatment options

## 2019-03-01 ENCOUNTER — TELEPHONE (OUTPATIENT)
Dept: OPTOMETRY | Facility: CLINIC | Age: 59
End: 2019-03-01

## 2019-03-04 ENCOUNTER — PATIENT MESSAGE (OUTPATIENT)
Dept: ORTHOPEDICS | Facility: CLINIC | Age: 59
End: 2019-03-04

## 2019-03-06 ENCOUNTER — HOSPITAL ENCOUNTER (OUTPATIENT)
Dept: RADIOLOGY | Facility: HOSPITAL | Age: 59
Discharge: HOME | End: 2019-03-06
Attending: ORTHOPAEDIC SURGERY
Payer: MEDICARE

## 2019-03-06 ENCOUNTER — OFFICE VISIT (OUTPATIENT)
Dept: ORTHOPEDICS | Facility: CLINIC | Age: 59
End: 2019-03-06
Payer: MEDICARE

## 2019-03-06 VITALS
DIASTOLIC BLOOD PRESSURE: 80 MMHG | WEIGHT: 150.81 LBS | SYSTOLIC BLOOD PRESSURE: 122 MMHG | BODY MASS INDEX: 28.47 KG/M2 | HEART RATE: 95 BPM | HEIGHT: 61 IN

## 2019-03-06 DIAGNOSIS — M81.6 LOCALIZED OSTEOPOROSIS OF LEQUESNE: ICD-10-CM

## 2019-03-06 DIAGNOSIS — M25.552 LEFT HIP PAIN: ICD-10-CM

## 2019-03-06 DIAGNOSIS — S76.012D STRAIN OF HIP ADDUCTOR MUSCLE, LEFT, SUBSEQUENT ENCOUNTER: Primary | ICD-10-CM

## 2019-03-06 DIAGNOSIS — M80.80XA PATHOLOGICAL FRACTURE DUE TO OSTEOPOROSIS, UNSPECIFIED FRACTURE SITE, UNSPECIFIED OSTEOPOROSIS TYPE, INITIAL ENCOUNTER: Primary | ICD-10-CM

## 2019-03-06 DIAGNOSIS — M84.353D STRESS FRACTURE OF NECK OF FEMUR WITH ROUTINE HEALING, SUBSEQUENT ENCOUNTER: ICD-10-CM

## 2019-03-06 PROCEDURE — 99999 PR PBB SHADOW E&M-EST. PATIENT-LVL V: CPT | Mod: PBBFAC,,, | Performed by: PHYSICIAN ASSISTANT

## 2019-03-06 PROCEDURE — 99999 PR PBB SHADOW E&M-EST. PATIENT-LVL II: CPT | Mod: PBBFAC,,, | Performed by: ORTHOPAEDIC SURGERY

## 2019-03-06 PROCEDURE — 99213 OFFICE O/P EST LOW 20 MIN: CPT | Mod: S$GLB,,, | Performed by: PHYSICIAN ASSISTANT

## 2019-03-06 PROCEDURE — 99214 OFFICE O/P EST MOD 30 MIN: CPT | Mod: S$GLB,,, | Performed by: ORTHOPAEDIC SURGERY

## 2019-03-06 PROCEDURE — 99213 PR OFFICE/OUTPT VISIT, EST, LEVL III, 20-29 MIN: ICD-10-PCS | Mod: S$GLB,,, | Performed by: PHYSICIAN ASSISTANT

## 2019-03-06 PROCEDURE — 99999 PR PBB SHADOW E&M-EST. PATIENT-LVL II: ICD-10-PCS | Mod: PBBFAC,,, | Performed by: ORTHOPAEDIC SURGERY

## 2019-03-06 PROCEDURE — 99214 PR OFFICE/OUTPT VISIT, EST, LEVL IV, 30-39 MIN: ICD-10-PCS | Mod: S$GLB,,, | Performed by: ORTHOPAEDIC SURGERY

## 2019-03-06 PROCEDURE — 3008F PR BODY MASS INDEX (BMI) DOCUMENTED: ICD-10-PCS | Mod: CPTII,S$GLB,, | Performed by: PHYSICIAN ASSISTANT

## 2019-03-06 PROCEDURE — 99999 PR PBB SHADOW E&M-EST. PATIENT-LVL V: ICD-10-PCS | Mod: PBBFAC,,, | Performed by: PHYSICIAN ASSISTANT

## 2019-03-06 PROCEDURE — 3008F BODY MASS INDEX DOCD: CPT | Mod: CPTII,S$GLB,, | Performed by: PHYSICIAN ASSISTANT

## 2019-03-06 PROCEDURE — 73502 X-RAY EXAM HIP UNI 2-3 VIEWS: CPT | Mod: TC,LT

## 2019-03-06 PROCEDURE — 73502 X-RAY EXAM HIP UNI 2-3 VIEWS: CPT | Mod: 26,LT,, | Performed by: RADIOLOGY

## 2019-03-06 PROCEDURE — 73502 XR HIP 2 VIEW LEFT: ICD-10-PCS | Mod: 26,LT,, | Performed by: RADIOLOGY

## 2019-03-06 NOTE — PROGRESS NOTES
"  SUBJECTIVE:     Chief Complaint & History of Present Illness:  Joyce Peterson is a new patient 58 y.o. female who is seen here today for a bone health evaluation for osteoporosis, fracture prevention, and risk evaluation for future fractures.        she was appropriately identified and referred by No ref. provider found due to concerns for compromised bone quality, and risk of future fractures.  This visit is medically necessary to identify risk, investigate and initiative treatment as appropriate to improve bone quality and strength for the reduction of secondary fractures.     her medical history, medications and fracture history will be reviewed and summarized      Review of patient's allergies indicates:   Allergen Reactions    Actemra [tocilizumab] Other (See Comments)     Severe dizziness    Codeine Nausea And Vomiting    Gold au 198 Hives and Rash    Hydroxychloroquine Other (See Comments)     Can't remember the reaction      Iodinated contrast- oral and iv dye Other (See Comments)     Other reaction(s): BURNING ALL OVER    Iodine Other (See Comments)     Other reaction(s): BURNING ALL OVER - Iodine dye - Not topical    Sulfa (sulfonamide antibiotics) Other (See Comments)     Can't remember the reaction    Zofran [ondansetron hcl (pf)] Nausea And Vomiting     Pt reports that last time she received zofran she started vomiting again    Methotrexate analogues Nausea Only    Pneumovax 23 [pneumococcal 23-argenis ps vaccine] Other (See Comments)     "sick"         Current Outpatient Medications   Medication Sig Dispense Refill    aspirin 81 mg Tab Take 81 mg by mouth every morning.       azelastine (ASTELIN) 137 mcg nasal spray 1 spray (137 mcg total) by Nasal route 2 (two) times daily. (Patient taking differently: 1 spray by Nasal route 2 (two) times daily as needed. ) 30 mL 11    azithromycin (Z-DEVON) 250 MG tablet Take 2 tablets by mouth on day 1; Take 1 tablet by mouth on days 2-5 6 tablet 0    " budesonide-formoterol 160-4.5 mcg (SYMBICORT) 160-4.5 mcg/actuation HFAA Inhale 2 puffs into the lungs every 12 (twelve) hours. 3 Inhaler 3    calcium citrate-vitamin D (CITRACAL + D) 315-200 mg-unit per tablet Take 1 tablet by mouth 2 (two) times daily.       diazePAM (VALIUM) 5 MG tablet Take once for MRI 1 tablet 0    diclofenac sodium (VOLTAREN) 1 % Gel Apply 2 g topically 4 (four) times daily as needed. 500 g 5    docusate sodium (COLACE) 100 MG capsule Take 1 capsule (100 mg total) by mouth 3 (three) times daily. 90 capsule 0    fish oil-omega-3 fatty acids 300-1,000 mg capsule Take 1,400 g by mouth once daily.      flaxseed oil 1,000 mg Cap Take by mouth once daily.      flu vac [id number] 36mos up,PF, 60 mcg (15 mcg x 4)/0.5 mL Syrg To be administered by Pharm D 0.5 mL 0    fluconazole (DIFLUCAN) 100 MG tablet Take 100 mg by mouth once daily.  3    fluticasone (FLONASE) 50 mcg/actuation nasal spray       GUAIFENESIN (MUCINEX ORAL) Take 400 mg by mouth every 4 (four) hours as needed.       INV PROPULSID 10 MG Take 20 mg by mouth 4 (four) times daily. FOR INVESTIGATIONAL USE ONLY. Protocol [id number] each 0    leflunomide (ARAVA) 20 MG Tab TAKE 1 TABLET BY MOUTH EVERY DAY 30 tablet 0    montelukast (SINGULAIR) 10 mg tablet Take 1 tablet (10 mg total) by mouth nightly. 90 tablet 3    naproxen (NAPROSYN) 500 MG tablet TAKE 1 TABLET TWICE A DAY AS NEEDED 180 tablet 1    neomycin-polymyxin-dexamethasone (DEXACINE) 3.5 mg/g-10,000 unit/g-0.1 % Oint Apply to affected external eyelid tissue one or two times per day for ten days 3.5 g 0    omeprazole (PRILOSEC) 40 MG capsule TAKE ONE CAPSULE BY MOUTH EVERY MORNING 30 capsule 6    omeprazole-sodium bicarbonate (ZEGERID) 40-1.1 mg-gram per capsule TAKE 1 CAPSULE BY MOUTH EVERY MORNING. 90 capsule 3    polyethylene glycol (GLYCOLAX) 17 gram/dose powder Mix 1 capful (17 grams total) with liquid and take by mouth once daily. 1581 g 0    POTASSIUM  "ORAL Take by mouth once daily.      predniSONE (DELTASONE) 1 MG tablet Take 3 mg by mouth once daily.  1    predniSONE (DELTASONE) 5 MG tablet Take 5 mg by mouth once daily. 7 mg daily  0    PREMARIN 0.625 mg tablet Take 0.625 mg by mouth once daily.  4    progesterone (PROMETRIUM) 100 MG capsule Take 100 mg by mouth every morning. 1 Capsule Oral Every day, morning      PROVENTIL HFA 90 mcg/actuation inhaler INHALE 2 PUFFS EVERY 6 HOURS AS NEEDED 20.1 Inhaler 0    RESTASIS 0.05 % ophthalmic emulsion PLACE 0.4 MLS (1 DROP TOTAL) INTO BOTH EYES 2 (TWO) TIMES DAILY.  5    rizatriptan (MAXALT) 10 MG tablet Take 10 mg by mouth as needed for Migraine.       rosuvastatin (CRESTOR) 20 MG tablet TAKE 1 TABLET EVERY DAY (Patient taking differently: TAKE 1 TABLET EVERY evening) 90 tablet 3    sarilumab (KEVZARA) 200 mg/1.14 mL PnIj Inject 1.14 mLs (200 mg total) into the skin every 14 (fourteen) days. 2 pen 2    sarilumab (KEVZARA) 200 mg/1.14 mL Syrg Inject 1.14 mLs (200 mg total) into the skin every 14 (fourteen) days. 2.28 mL 2    sucralfate (CARAFATE) 1 gram tablet TAKE 1 TABLET (1 G TOTAL) BY MOUTH 4 (FOUR) TIMES DAILY. 360 tablet 3    syringe with needle (TUBERCULIN SYRINGE) 1 mL 27 x 1/2" Syrg To administer methotrexate 7.5mg sc once a week 12 Syringe 3    traMADol (ULTRAM) 50 mg tablet Take 1 tablet (50 mg total) by mouth every 6 (six) hours. 28 tablet 0    trospium (SANCTURA) 20 mg Tab tablet Take 1 tablet (20 mg total) by mouth 2 (two) times daily. 60 tablet 11    valACYclovir (VALTREX) 1000 MG tablet TAKE 1 TABLET BY MOUTH TWICE A DAY AS NEEDED 20 tablet 3    varicella-zoster [id number], PF, (SHINGRIX, PF,) 50 mcg/0.5 mL injection Inject into the muscle. 0.5 mL 0     No current facility-administered medications for this visit.      Facility-Administered Medications Ordered in Other Visits   Medication Dose Route Frequency Provider Last Rate Last Dose    0.9%  NaCl infusion   Intravenous Continuous " Callum Singer MD   Stopped at 10/17/18 1058       Past Medical History:   Diagnosis Date    Acid reflux     Allergy     Anemia     Asthma     Coronary artery disease     Degenerative disc disease     Dry eyes     Dry mouth     Gastroparesis     Hyperlipidemia     Lateral meniscus derangement 4/6/2016    Lobular carcinoma in situ     Osteoarthritis     Osteoporosis     Rheumatoid arthritis(714.0)     Umbilical hernia 8/13/2015       Past Surgical History:   Procedure Laterality Date    ARTHROSCOPY-KNEE Right 4/6/2016    [facility]formed [facility] OR 2ND FLR    BREAST BIOPSY Left 01/29/2002    core bx    CARPAL TUNNEL RELEASE Right 05/2017    CHOLECYSTECTOMY  2004    COLONOSCOPY      10/11    COLONOSCOPY N/A 6/29/2017    Performed by López Moore MD at [facility] ENDO (4TH FLR)    ESOPHAGOGASTRODUODENOSCOPY (EGD) N/A 6/29/2017    Performed by L[facility]pez Moore [facility] at [facility] ENDO (4TH FLR)    RELEASE-CARPAL TUNNEL / Right Right 5/29/2017    Performed by [facility]rough [facility] at [facility] OR    REPAIR, TENDON, TRICEPS left elbow Left 10/17/2018    Performed by [facility] at [facility] OR 1ST FLR    REPAIR-HERNIA-UMBILICAL N/A 8/13/2015    Performed by Ricardo Small MD at [facility] OR 2ND FLR    TONSILLECTOMY      TUBAL LIGATION  2003    UPPER GASTROINTESTINAL ENDOSCOPY      10/11    uterine ablation  2003       Lab Results   Component Value Date     02/28/2019    K 3.9 02/28/2019     02/28/2019    CO2 24 02/28/2019     02/28/2019    BUN 15 02/28/2019    CREATININE 0.8 02/28/2019    CALCIUM 9.2 02/28/2019    PROT 7.0 02/28/2019    ALBUMIN 3.8 02/28/2019    BILITOT 0.4 02/28/2019    ALKPHOS 112 02/28/2019    AST 26 02/28/2019    ALT 25 02/28/2019    ANIONGAP 9 02/28/2019    ESTGFRAFRICA >60.0 02/28/2019    EGFRNONAA >60.0 02/28/2019      Lab Results   Component Value Date    WBC 3.86 (L) 02/28/2019    RBC 4.92 02/28/2019    HGB 14.1 02/28/2019    HCT  44.0 02/28/2019    MCV 89 02/28/2019    MCH 28.7 02/28/2019    MCHC 32.0 02/28/2019    RDW 14.0 02/28/2019     02/28/2019    MPV 10.1 02/28/2019    GRAN 2.3 02/28/2019    GRAN 59.3 02/28/2019    LYMPH 1.0 02/28/2019    LYMPH 25.1 02/28/2019    MONO 0.5 02/28/2019    MONO 14.0 02/28/2019    EOS 0.0 02/28/2019    BASO 0.04 02/28/2019    EOSINOPHIL 0.3 02/28/2019    BASOPHIL 1.0 02/28/2019    DIFFMETHOD Automated 02/28/2019      Lab Results   Component Value Date    MG 2.2 10/15/2018      Lab Results   Component Value Date    PHOS 2.4 (L) 10/15/2018      Lab Results   Component Value Date    AAGRSYWD47BR 40 02/28/2019      Lab Results   Component Value Date    PTH 84.0 (H) 02/28/2019      Lab Results   Component Value Date    TSH 0.424 02/28/2019      Lab Results   Component Value Date    FREET4 0.98 05/16/2018      Lab Results   Component Value Date    HGBA1C 6.0 02/06/2017    ESTIMATEDAVG 126 02/06/2017      No results found for: FREETESTOSTE         Vital Signs (Most Recent)  Vitals:    03/06/19 1008   BP: 122/80   Pulse: 95         Today's Visit and Bone Health History      she has medical history and medication use known to be associated with bone loss and osteoporosis to include multiple low energy fractures previous diagnosis of osteoporosis treated with oral biphosphates opioid pain medications, oral steroids, PPIs.  Positive history of asthma long-term treatment with oral and inhaled steroids    The last DXA was performed in 02/28/2019.          T-Score Hip: -1.1     T-Score Spine: -2.1      FRAX:      Major Fx.: 12%         Hip Fx.: 0.9%      Review of Systems:  ROS:  Constitutional: no fever or chills  Eyes: no visual changes  ENT: no nasal congestion or sore throat  Respiratory: no respiratory symptoms and Positive for asthma  Cardiovascular: no chest pain or palpitations, Positive CAD  Gastrointestinal: no nausea or vomiting, tolerating diet, Positive GERD, gastroparesis  Genitourinary: no hematuria  "or dysuria  Integument/Breast: no rash or pruritis  Hematologic/Lymphatic: no easy bruising or lymphadenopathy, Positive Sjogren's syndrome, immunosuppressed status lobular carcinoma in situ, iron deficiency anemia  Musculoskeletal: no arthralgias or myalgias, Positive RA, osteopenia,  Neurological: no seizures or tremors, Positive degenerative disc disease, lumbar stenosis, cervical spondylosis  Behavioral/Psych: no auditory or visual hallucinations  Endocrine: no heat or cold intolerance, Positive elevated parathyroid hormone, low TSH, thyroid nodule      OBJECTIVE:     PHYSICAL EXAM:  Height: 5' 1" (154.9 cm) Weight: 68.4 kg (150 lb 12.7 oz),                  General: no acute distress  Behavioral/Psych: normal judgment and insight  Skin: no rash  Head/Neck: atraumatic, normocephalic, without obvious abnormality  Respiratory: normal respiratory effort  Cardiac: regular rate and rhythm  Vascular: normal  Abdomen: soft, non-tender  Musculoskeletal: no joint tenderness, deformity or swelling               ASSESSMENT/PLAN:     Assessment:    Osteoporosis, at high risk for future fractures, history of pathological fracture.    Plan:   30-45 minutes spent in face-to-face consultation with patient and her family members today, discussing the disease management of osteoporosis, for the reduction of future fractures.  I have explained that bone strength is equal to bone quality. A bone density / DEXA scan is important to complement her care since her fracture was by definition a fragility fracture/traumatic fracture.  Over half of the encounter was spent (50%) counseling the patient on the disease of osteoporosis evidence-based and best practice treatment options available as well as recommendations for improvement of bone quality, the importance of nutritional supplements to include:  Calcium 6054-2639 mg daily in divided doses   Vitamin D3  9422-1610 units daily in divided doses.   Fall prevention and personal safety " for the reduction of future fractures.    Clinicians Guidelines for the treatment of Osteoporosis 2017 as part of the National Osteoporosis Foundation were utilized as the evidence-based information provided.    Discussed pharmaceutical treatment options to include Biphosphatases, Denosumab, Abaloparatide and Teriparatide. Information to include indications for therapy, risks and benefits to treatment, and important safety information related to these treatments was provided and discussed.  Handouts were given to the patient for her review on osteoporosis and other pharmacological treatment information related to other available treatment options.    The importance of diet, impact exercise, and core strengthening with gait and balance exercise, through  both formal physical therapy programs and home exercise programs was discussed.       Patient will continue her current treatment with Prolia and followed by Endocrinology

## 2019-03-06 NOTE — PROGRESS NOTES
"H&P  Orthopaedics    SUBJECTIVE:     History of Present Illness:  Patient is a 58 y.o. female with left hip pain and left femoral neck stress reaction on MRI, only a very small area of T2 adjacent to cortex on compressive side with no T1 line showing fracture. She was seen by LU Vila 4 weeks ago and referred to me for follow-up. She denies inciting injury. This began initially as pain anterior and lateral to the hip which has progressed to groin pain. The pain is worse with any movement of the hip, not directly associated with weightbearing. Since her last visit she has been partial weightbearing, using a cane and a walker. She takes 1000 mg Naproxen per day for pain. No fevers, chills, nausea, vomiting.    Medical history notable for osteopenia (on Prolia and citracal+D, last dexa 2/28/19 T-score -2.1 lumbar vertebra, -1.1 left femoral neck) and RA (on Kevzara and prednisone 5 mg)    Review of patient's allergies indicates:   Allergen Reactions    Actemra [tocilizumab] Other (See Comments)     Severe dizziness    Codeine Nausea And Vomiting    Gold au 198 Hives and Rash    Hydroxychloroquine Other (See Comments)     Can't remember the reaction      Iodinated contrast- oral and iv dye Other (See Comments)     Other reaction(s): BURNING ALL OVER    Iodine Other (See Comments)     Other reaction(s): BURNING ALL OVER - Iodine dye - Not topical    Sulfa (sulfonamide antibiotics) Other (See Comments)     Can't remember the reaction    Zofran [ondansetron hcl (pf)] Nausea And Vomiting     Pt reports that last time she received zofran she started vomiting again    Methotrexate analogues Nausea Only    Pneumovax 23 [pneumococcal 23-argenis ps vaccine] Other (See Comments)     "sick"       Past Medical History:   Diagnosis Date    Acid reflux     Allergy     Anemia     Asthma     Coronary artery disease     Degenerative disc disease     Dry eyes     Dry mouth     Gastroparesis     Hyperlipidemia  "    Lateral meniscus derangement 4/6/2016    Lobular carcinoma in situ     Osteoarthritis     Osteoporosis     Rheumatoid arthritis(714.0)     Umbilical hernia 8/13/2015     Past Surgical History:   Procedure Laterality Date    ARTHROSCOPY-KNEE Right 4/6/2016    [facility] OR 2ND FLR    BREAST BIOPSY Left 01/29/2002    core bx    CARPAL TUNNEL RELEASE Right 05/2017    CHOLECYSTECTOMY  2004    COLONOSCOPY      10/11    COLONOSCOPY N/A 6/29/2017    Performed by L[facility]pez Moore [facility] at [facility] ENDO (4TH FLR)    ESOPHAGOGASTRODUODENOSCOPY (EGD) N/A 6/29/2017    Performed by L[facility]pez Moore [facility] at [facility] ENDO (4TH FLR)    RELEASE-CARPAL TUNNEL / Right Right 5/29/2017    [facility] OR    REPAIR, TENDON, TRICEPS left elbow Left 10/17/2018    Performed by [facility] at [facility] OR 1ST FLR    REPAIR-HERNIA-UMBILICAL N/A 8/13/2015    Performed by Ricardo Small MD at [facility] OR 2ND FLR    TONSILLECTOMY      TUBAL LIGATION  2003    UPPER GASTROINTESTINAL ENDOSCOPY      10/11    uterine ablation  2003     Family History   Problem Relation Age of Onset    Cancer Mother         Lung Cancer    Emphysema Mother     Heart attack Mother     Cancer Father         Lung Cancer    Osteoarthritis Father     Lung cancer Father     Skin cancer Father     Heart disease Brother     Heart attack Brother     Osteoarthritis Paternal Aunt     Retinal detachment Maternal Aunt     No Known Problems Sister     No Known Problems Maternal Uncle     No Known Problems Paternal Uncle     No Known Problems Maternal Grandmother     No Known Problems Maternal Grandfather     No Known Problems Paternal Grandmother     No Known Problems Paternal Grandfather     Colon cancer Neg Hx     Esophageal cancer Neg Hx     Stomach cancer Neg Hx     Celiac disease Neg Hx     Diabetes Neg Hx     Thyroid disease Neg Hx     Amblyopia Neg Hx     Blindness Neg  Hx     Cataracts Neg Hx     Glaucoma Neg Hx     Hypertension Neg Hx     Macular degeneration Neg Hx     Strabismus Neg Hx     Stroke Neg Hx      Social History     Tobacco Use    Smoking status: Never Smoker    Smokeless tobacco: Never Used   Substance Use Topics    Alcohol use: Yes     Comment: occasionally    Drug use: No        Review of Systems:  Patient denies constitutional symptoms, cardiac symptoms, respiratory symptoms, GI symptoms.  The remainder of the musculoskeletal ROS is included in the HPI.      OBJECTIVE:     Physical Exam:  HEENT: normocephalic, atraumatic  Resp: no increased work of breathing  CV: regular rate and rhythm  MSK: moves b/l upper extremities well    Left lower extremity:  Skin intact  Sensation intact SP/DP/T/Sural/Saphenous nerves  Motor intact EHL/FHL/TA/gastroc  TTP over greater trochanter  Groin pain with SANDRA  No pain with full weight bearing LLE  Palpable DP/PT pulses      Diagnostic Results:  Left hip X-rays were ordered and images reviewed by me.  These showed normal left hip, no evidence of fracture.     Previous MRI pelvis reviewed. T1 shows small area of bony edema at compression side of left femoral neck, not involving the cortex. This does not correlate with a fracture line on T2. Therefore would consider this a stress reaction which could be related to adductor strain.     ASSESSMENT/PLAN:     A/P: Joyce Peterson is a 58 y.o. with left femoral neck stress reaction, adductor strain    Plan:  MRI consistent with stress reaction on compression side of left femoral neck without fracture. No indication for perc screw fixation at this time, but discussed with patient that with additional trauma this could become a femoral neck fracture. She has some continued pain with movement in the area of the adductors, but no pain with load bearing alone (resoled since initial presentation) suggesting the bony issue is healing well.  Will put in physical therapy referral  for stretching and strengthening of left hip adductors/abductors and core. Advance weight bearing as tolerated with walker. Continue to use walker for at least next three weeks for protection from falls. Pt has trip to [place] coming up in 6 weeks. She will follow-up with us in 6 weeks prior to the trip. If symptoms worsen or do not improve in 3 weeks she will call to be seen sooner.     No x-rays at next visit. Will consider repeat MRI if symptoms worsen. She has also been instructed to cease weight bearing and come back immediately should she again have pain with isolated loading/weight bearing (standing).

## 2019-03-07 DIAGNOSIS — M06.9 RHEUMATOID ARTHRITIS, INVOLVING UNSPECIFIED SITE, UNSPECIFIED RHEUMATOID FACTOR PRESENCE: ICD-10-CM

## 2019-03-07 RX ORDER — NAPROXEN 500 MG/1
TABLET ORAL
Qty: 180 TABLET | Refills: 1 | Status: SHIPPED | OUTPATIENT
Start: 2019-03-07 | End: 2019-07-22 | Stop reason: SDUPTHER

## 2019-03-08 RX ORDER — LEFLUNOMIDE 20 MG/1
20 TABLET ORAL DAILY
Qty: 30 TABLET | Refills: 0 | Status: SHIPPED | OUTPATIENT
Start: 2019-03-08 | End: 2019-03-12 | Stop reason: SDUPTHER

## 2019-03-12 DIAGNOSIS — M06.9 RHEUMATOID ARTHRITIS, INVOLVING UNSPECIFIED SITE, UNSPECIFIED RHEUMATOID FACTOR PRESENCE: ICD-10-CM

## 2019-03-12 RX ORDER — LEFLUNOMIDE 20 MG/1
20 TABLET ORAL DAILY
Qty: 30 TABLET | Refills: 0 | Status: SHIPPED | OUTPATIENT
Start: 2019-03-12 | End: 2019-03-21 | Stop reason: SDUPTHER

## 2019-03-15 ENCOUNTER — OFFICE VISIT (OUTPATIENT)
Dept: SPORTS MEDICINE | Facility: CLINIC | Age: 59
End: 2019-03-15
Payer: MEDICARE

## 2019-03-15 ENCOUNTER — HOSPITAL ENCOUNTER (OUTPATIENT)
Dept: RADIOLOGY | Facility: HOSPITAL | Age: 59
Discharge: HOME | End: 2019-03-15
Attending: PHYSICIAN ASSISTANT
Payer: MEDICARE

## 2019-03-15 VITALS
SYSTOLIC BLOOD PRESSURE: 133 MMHG | BODY MASS INDEX: 28.32 KG/M2 | HEIGHT: 61 IN | DIASTOLIC BLOOD PRESSURE: 86 MMHG | TEMPERATURE: 98 F | HEART RATE: 107 BPM | WEIGHT: 150 LBS

## 2019-03-15 DIAGNOSIS — M25.552 LEFT HIP PAIN: ICD-10-CM

## 2019-03-15 DIAGNOSIS — M84.353D STRESS FRACTURE OF NECK OF FEMUR WITH ROUTINE HEALING, SUBSEQUENT ENCOUNTER: ICD-10-CM

## 2019-03-15 DIAGNOSIS — M76.32 ILIOTIBIAL BAND TENDINITIS OF LEFT SIDE: ICD-10-CM

## 2019-03-15 DIAGNOSIS — M76.892 HIP FLEXOR TENDINITIS, LEFT: ICD-10-CM

## 2019-03-15 DIAGNOSIS — M25.552 LEFT HIP PAIN: Primary | ICD-10-CM

## 2019-03-15 DIAGNOSIS — M76.892 ADDUCTOR TENDINITIS OF LEFT HIP: ICD-10-CM

## 2019-03-15 DIAGNOSIS — M70.62 TROCHANTERIC BURSITIS OF LEFT HIP: ICD-10-CM

## 2019-03-15 PROCEDURE — 3008F PR BODY MASS INDEX (BMI) DOCUMENTED: ICD-10-PCS | Mod: CPTII,S$GLB,, | Performed by: PHYSICIAN ASSISTANT

## 2019-03-15 PROCEDURE — 73502 X-RAY EXAM HIP UNI 2-3 VIEWS: CPT | Mod: TC,FY,PO,LT

## 2019-03-15 PROCEDURE — 3008F BODY MASS INDEX DOCD: CPT | Mod: CPTII,S$GLB,, | Performed by: PHYSICIAN ASSISTANT

## 2019-03-15 PROCEDURE — 73502 X-RAY EXAM HIP UNI 2-3 VIEWS: CPT | Mod: 26,LT,, | Performed by: RADIOLOGY

## 2019-03-15 PROCEDURE — 99215 PR OFFICE/OUTPT VISIT, EST, LEVL V, 40-54 MIN: ICD-10-PCS | Mod: S$GLB,,, | Performed by: PHYSICIAN ASSISTANT

## 2019-03-15 PROCEDURE — 99999 PR PBB SHADOW E&M-EST. PATIENT-LVL V: CPT | Mod: PBBFAC,,, | Performed by: PHYSICIAN ASSISTANT

## 2019-03-15 PROCEDURE — 73502 XR HIP 2 VIEW LEFT: ICD-10-PCS | Mod: 26,LT,, | Performed by: RADIOLOGY

## 2019-03-15 PROCEDURE — 99215 OFFICE O/P EST HI 40 MIN: CPT | Mod: S$GLB,,, | Performed by: PHYSICIAN ASSISTANT

## 2019-03-15 PROCEDURE — 99999 PR PBB SHADOW E&M-EST. PATIENT-LVL V: ICD-10-PCS | Mod: PBBFAC,,, | Performed by: PHYSICIAN ASSISTANT

## 2019-03-18 ENCOUNTER — TELEPHONE (OUTPATIENT)
Dept: PHARMACY | Facility: CLINIC | Age: 59
End: 2019-03-18

## 2019-03-18 NOTE — PROGRESS NOTES
CC: left hip pain    HPI:   Joyce Peterson is a pleasant 58 y.o. patient who reports to clinic with left hip pain. No trauma, [facility]    She reports that her hip pain started in the middle of January 2019 located of the inner thigh and groin area without any injury or trauma. Pain continued to worsen gradually and moved to the lateral hip area until she was seen by Joyce May on 2/4 and determined to have a stress reaction of the surgical neck of the left femur. She was placed on crutches for 4-6 weeks with TTWB with improvement of her hip pain. This pain was located of the lateral hip area and worse with weightbearing and also walking. She now has no pain with weightbearing but is concerned because she still have some hip pain in different areas.     She is now having some intermittent hip pain located of the deep inner groin, anterior and lateral hip areas that is mild to moderate at times. No pain still idle with full weightbearing. She is still using a cane for ambulation assistance.     Today the patient rates pain at a 4-6/10 on visual analog scale.      Affecting ADLs and exercising    Walking and after excesssive activity makes pain worse    She has a trip coming up on Aprill 22nd that she wants to be improved for.     She has a PMH of Rheumatoid Arthritis see by Dr. Moran. PMH of 9 stress fractures in the past to various areas.     Medical history notable for osteopenia (on Prolia and citracal+D, last dexa 2/28/19 T-score -2.1 lumbar vertebra, -1.1 left femoral neck) and RA (on Kevzara and prednisone 5 mg)    Review of Systems   Constitution: Negative. Negative for chills, fever and night sweats.   HENT: Negative for congestion and headaches.    Eyes: Negative for blurred vision, left vision loss and right vision loss.   Cardiovascular: Negative for chest pain and syncope.   Respiratory: Negative for cough and shortness of breath.    Endocrine: Negative for polydipsia, polyphagia and  polyuria.   Hematologic/Lymphatic: Negative for bleeding problem. Does not bruise/bleed easily.   Skin: Negative for dry skin, itching and rash.   Musculoskeletal: Negative for falls and muscle weakness.   Gastrointestinal: Negative for abdominal pain and bowel incontinence.   Genitourinary: Negative for bladder incontinence and nocturia.   Neurological: Negative for disturbances in coordination, loss of balance and seizures.   Psychiatric/Behavioral: Negative for depression. The patient does not have insomnia.    Allergic/Immunologic: Negative for hives and persistent infections.   All other systems negative.    PAST MEDICAL HISTORY:   Past Medical History:   Diagnosis Date    Acid reflux     Allergy     Anemia     Asthma     Coronary artery disease     Degenerative disc disease     Dry eyes     Dry mouth     Gastroparesis     Hyperlipidemia     Lateral meniscus derangement 4/6/2016    Lobular carcinoma in situ     Osteoarthritis     Osteoporosis     Rheumatoid arthritis(714.0)     Umbilical hernia 8/13/2015     PAST SURGICAL HISTORY:   Past Surgical History:   Procedure Laterality Date    ARTHROSCOPY-KNEE Right 4/6/2016    [facility]formed [facility] OR 2ND FLR    BREAST BIOPSY Left 01/29/2002    core bx    CARPAL TUNNEL RELEASE Right 05/2017    CHOLECYSTECTOMY  2004    COLONOSCOPY      10/11    COLONOSCOPY N/A 6/29/2017    Performed by López Moore MD at [facility] ENDO (4TH FLR)    ESOPHAGOGASTRODUODENOSCOPY (EGD) N/A 6/29/2017    Performed by L[facility] at [facility] ENDO (4TH FLR)    RELEASE-CARPAL TUNNEL / Right Right 5/29/2017    [facility] OR    REPAIR, TENDON, TRICEPS left elbow Left 10/17/2018    [facility] at [facility] OR 1ST FLR    REPAIR-HERNIA-UMBILICAL N/A 8/13/2015    Performed by Ricardo Small MD at [facility] OR 2ND FLR    TONSILLECTOMY      TUBAL LIGATION  2003    UPPER GASTROINTESTINAL ENDOSCOPY       10/11    uterine ablation  2003     FAMILY HISTORY:   Family History   Problem Relation Age of Onset    Cancer Mother         Lung Cancer    Emphysema Mother     Heart attack Mother     Cancer Father         Lung Cancer    Osteoarthritis Father     Lung cancer Father     Skin cancer Father     Heart disease Brother     Heart attack Brother     Osteoarthritis Paternal Aunt     Retinal detachment Maternal Aunt     No Known Problems Sister     No Known Problems Maternal Uncle     No Known Problems Paternal Uncle     No Known Problems Maternal Grandmother     No Known Problems Maternal Grandfather     No Known Problems Paternal Grandmother     No Known Problems Paternal Grandfather     Colon cancer Neg Hx     Esophageal cancer Neg Hx     Stomach cancer Neg Hx     Celiac disease Neg Hx     Diabetes Neg Hx     Thyroid disease Neg Hx     Amblyopia Neg Hx     Blindness Neg Hx     Cataracts Neg Hx     Glaucoma Neg Hx     Hypertension Neg Hx     Macular degeneration Neg Hx     Strabismus Neg Hx     Stroke Neg Hx      SOCIAL HISTORY:   Social History     Socioeconomic History    Marital status:      Spouse name: Not on file    Number of children: Not on file    Years of education: Not on file    Highest education level: Not on file   Social Needs    Financial resource strain: Not on file    Food insecurity - worry: Not on file    Food insecurity - inability: Not on file    Transportation needs - medical: Not on file    Transportation needs - non-medical: Not on file   Occupational History    Not on file   Tobacco Use    Smoking status: Never Smoker    Smokeless tobacco: Never Used   Substance and Sexual Activity    Alcohol use: Yes     Comment: occasionally    Drug use: No    Sexual activity: Yes     Partners: Male     Birth control/protection: Surgical   Other Topics Concern    Are you pregnant or think you may be? Not Asked    Breast-feeding Not Asked   Social  History Narrative    Not on file       MEDICATIONS:   Current Outpatient Medications:     aspirin 81 mg Tab, Take 81 mg by mouth every morning. , Disp: , Rfl:     azelastine (ASTELIN) 137 mcg nasal spray, 1 spray (137 mcg total) by Nasal route 2 (two) times daily. (Patient taking differently: 1 spray by Nasal route 2 (two) times daily as needed. ), Disp: 30 mL, Rfl: 11    azithromycin (Z-DEVON) 250 MG tablet, Take 2 tablets by mouth on day 1; Take 1 tablet by mouth on days 2-5, Disp: 6 tablet, Rfl: 0    budesonide-formoterol 160-4.5 mcg (SYMBICORT) 160-4.5 mcg/actuation HFAA, Inhale 2 puffs into the lungs every 12 (twelve) hours., Disp: 3 Inhaler, Rfl: 3    calcium citrate-vitamin D (CITRACAL + D) 315-200 mg-unit per tablet, Take 1 tablet by mouth 2 (two) times daily. , Disp: , Rfl:     ciprofloxacin HCl (CIPRO) 500 MG tablet, Take 1 tablet by mouth twice daily., Disp: 14 tablet, Rfl: 2    diazePAM (VALIUM) 5 MG tablet, Take once for MRI, Disp: 1 tablet, Rfl: 0    diclofenac sodium (VOLTAREN) 1 % Gel, Apply 2 g topically 4 (four) times daily as needed., Disp: 500 g, Rfl: 5    docusate sodium (COLACE) 100 MG capsule, Take 1 capsule (100 mg total) by mouth 3 (three) times daily., Disp: 90 capsule, Rfl: 0    fish oil-omega-3 fatty acids 300-1,000 mg capsule, Take 1,400 g by mouth once daily., Disp: , Rfl:     flaxseed oil 1,000 mg Cap, Take by mouth once daily., Disp: , Rfl:     flu vac [id number] 36mos up,PF, 60 mcg (15 mcg x 4)/0.5 mL Syrg, To be administered by Pharm D, Disp: 0.5 mL, Rfl: 0    fluconazole (DIFLUCAN) 100 MG tablet, Take 100 mg by mouth once daily., Disp: , Rfl: 3    fluticasone (FLONASE) 50 mcg/actuation nasal spray, , Disp: , Rfl:     GUAIFENESIN (MUCINEX ORAL), Take 400 mg by mouth every 4 (four) hours as needed. , Disp: , Rfl:     INV PROPULSID 10 MG, Take 20 mg by mouth 4 (four) times daily. FOR INVESTIGATIONAL USE ONLY. Protocol CIS-USA-154, Disp: 1440 each, Rfl: 0    leflunomide  (ARAVA) 20 MG Tab, Take 1 tablet (20 mg total) by mouth once daily., Disp: 30 tablet, Rfl: 0    montelukast (SINGULAIR) 10 mg tablet, Take 1 tablet (10 mg total) by mouth nightly., Disp: 90 tablet, Rfl: 3    naproxen (NAPROSYN) 500 MG tablet, TAKE 1 TABLET TWICE A DAY AS NEEDED, Disp: 180 tablet, Rfl: 1    neomycin-polymyxin-dexamethasone (DEXACINE) 3.5 mg/g-10,000 unit/g-0.1 % Oint, Apply to affected external eyelid tissue one or two times per day for ten days, Disp: 3.5 g, Rfl: 0    omeprazole (PRILOSEC) 40 MG capsule, TAKE ONE CAPSULE BY MOUTH EVERY MORNING, Disp: 30 capsule, Rfl: 6    omeprazole-sodium bicarbonate (ZEGERID) 40-1.1 mg-gram per capsule, TAKE 1 CAPSULE BY MOUTH EVERY MORNING., Disp: 90 capsule, Rfl: 3    phenazopyridine (PYRIDIUM) 200 MG tablet, Take 1 tablet by mouth four times daily as needed for urinary pain., Disp: 16 tablet, Rfl: 2    polyethylene glycol (GLYCOLAX) 17 gram/dose powder, Mix 1 capful (17 grams total) with liquid and take by mouth once daily., Disp: 1581 g, Rfl: 0    POTASSIUM ORAL, Take by mouth once daily., Disp: , Rfl:     predniSONE (DELTASONE) 1 MG tablet, Take 3 mg by mouth once daily., Disp: , Rfl: 1    predniSONE (DELTASONE) 5 MG tablet, Take 5 mg by mouth once daily. 7 mg daily, Disp: , Rfl: 0    PREMARIN 0.625 mg tablet, Take 0.625 mg by mouth once daily., Disp: , Rfl: 4    progesterone (PROMETRIUM) 100 MG capsule, Take 100 mg by mouth every morning. 1 Capsule Oral Every day, morning, Disp: , Rfl:     PROVENTIL HFA 90 mcg/actuation inhaler, INHALE 2 PUFFS EVERY 6 HOURS AS NEEDED, Disp: 20.1 Inhaler, Rfl: 0    RESTASIS 0.05 % ophthalmic emulsion, PLACE 0.4 MLS (1 DROP TOTAL) INTO BOTH EYES 2 (TWO) TIMES DAILY., Disp: , Rfl: 5    rizatriptan (MAXALT) 10 MG tablet, Take 10 mg by mouth as needed for Migraine. , Disp: , Rfl:     rosuvastatin (CRESTOR) 20 MG tablet, TAKE 1 TABLET EVERY DAY (Patient taking differently: TAKE 1 TABLET EVERY evening), Disp: 90  "tablet, Rfl: 3    sarilumab (KEVZARA) 200 mg/1.14 mL PnIj, Inject 1.14 mLs (200 mg total) into the skin every 14 (fourteen) days., Disp: 2 pen, Rfl: 2    sarilumab (KEVZARA) 200 mg/1.14 mL Syrg, Inject 1.14 mLs (200 mg total) into the skin every 14 (fourteen) days., Disp: 2.28 mL, Rfl: 2    sucralfate (CARAFATE) 1 gram tablet, TAKE 1 TABLET (1 G TOTAL) BY MOUTH 4 (FOUR) TIMES DAILY., Disp: 360 tablet, Rfl: 3    syringe with needle (TUBERCULIN SYRINGE) 1 mL 27 x 1/2" Syrg, To administer methotrexate 7.5mg sc once a week, Disp: 12 Syringe, Rfl: 3    traMADol (ULTRAM) 50 mg tablet, Take 1 tablet (50 mg total) by mouth every 6 (six) hours., Disp: 28 tablet, Rfl: 0    trospium (SANCTURA) 20 mg Tab tablet, Take 1 tablet (20 mg total) by mouth 2 (two) times daily., Disp: 60 tablet, Rfl: 11    valACYclovir (VALTREX) 1000 MG tablet, TAKE 1 TABLET BY MOUTH TWICE A DAY AS NEEDED, Disp: 20 tablet, Rfl: 3    varicella-zoster [id number], PF, (SHINGRIX, PF,) 50 mcg/0.5 mL injection, Inject into the muscle., Disp: 0.5 mL, Rfl: 0  No current facility-administered medications for this visit.     Facility-Administered Medications Ordered in Other Visits:     0.9%  NaCl infusion, , Intravenous, Continuous, Callum Singer MD, Stopped at 10/17/18 3147  ALLERGIES:   Review of patient's allergies indicates:   Allergen Reactions    Actemra [tocilizumab] Other (See Comments)     Severe dizziness    Codeine Nausea And Vomiting    Gold au 198 Hives and Rash    Hydroxychloroquine Other (See Comments)     Can't remember the reaction      Iodinated contrast- oral and iv dye Other (See Comments)     Other reaction(s): BURNING ALL OVER    Iodine Other (See Comments)     Other reaction(s): BURNING ALL OVER - Iodine dye - Not topical    Sulfa (sulfonamide antibiotics) Other (See Comments)     Can't remember the reaction    Zofran [ondansetron hcl (pf)] Nausea And Vomiting     Pt reports that last time she received zofran she " "started vomiting again    Methotrexate analogues Nausea Only    Pneumovax 23 [pneumococcal 23-argenis ps vaccine] Other (See Comments)     "sick"       VITAL SIGNS: /86   Pulse 107   Temp 98.3 °F (36.8 °C)   Ht 5' 1" (1.549 m)   Wt 68 kg (150 lb)   LMP  (LMP Unknown)   BMI 28.34 kg/m²        PHYSICAL EXAM /  HIP  PHYSICAL EXAMINATION  General:  The patient is alert and oriented x 3.  Mood is pleasant.  Observation of ears, eyes and nose reveal no gross abnormalities.  HEENT: NCAT, sclera nonicteric  Lungs: Respirations are equal and unlabored..    left HIP EXAMINATION     OBSERVATION / INSPECTION  Gait:   Nonantalgic   Alignment:  Neutral   Scars:   None   Muscle atrophy: None   Effusion:  None   Warmth:  None   Discoloration:   None   Leg lengths:   Equal   Pelvis:   Level     TENDERNESS / CREPITUS (T/C):      T / C  Trochanteric bursa   Mild + / -  Piriformis    - / -  SI joint    - / -  Psoas tendon   - / -  Rectus insertion  - / -  Adductor origin  + / -  Pubic symphysis  - / -  IT band                                   + / -  Gluteus tendons                     - / -    ROM: (* = pain)    Flexion:    120 degrees*  External rotation: 40 degrees  Internal rotation with axial load: 25 degrees  Internal rotation without axial load: 35 degrees  Abduction:  40 degrees*  Adduction:   20 degrees    SPECIAL TESTS:  Pain w/ forced internal rotation (FADIR): -   Pain w/ forced external rotation (SANDRA): Absent   Circumduction test:    -  Stinchfield test:    Negative   Log roll:      Negative   Snapping hip (internal):   Negative   Step-down test:    +  Trendelenburg test:    Negative  Bridge test     +     EXTREMITY NEURO-VASCULAR EXAMINATION:   Sensation:  Grossly intact to light touch all dermatomal regions.   Motor Function:  Fully intact motor function at hip, knee, foot and ankle    DTRs;  quadriceps and  achilles 2+.  No clonus and downgoing Babinski.    Vascular status:  DP and PT pulses 2+, brisk " capillary refill, symmetric.    Skin:  intact, compartments soft.    OTHER FINDINGS:    MRI left hip from 1/28/19 interpreted by Dr. Duff and team:  Previous MRI pelvis reviewed. T1 shows small area of bony edema at compression side of left femoral neck, not involving the cortex. This does not correlate with a fracture line on T2. Therefore would consider this a stress reaction which could be related to adductor strain.    CT left hip from 1/31/19:  The visualized intraabdominal content is unremarkable.  There is degenerative change visualized within the lower lumbar spine including vacuum disc phenomena at L5-S1.  The hip joint space is satisfactorily preserved.  There is osseous spur at the greater trochanter.  There is no fracture, dislocation, or bony erosion.  Specifically there is no fracture along the medial base of the left femoral neck within area of abnormal edematous signal seen on MRI dated 01/28/2019.    XRAYS:  2 hip/pelvis views were independently reviewed and interpreted by myself.  No fracture, subluxation. Mild left hip DJD noted.    ASSESSMENT:    1. left hip pain, acute  2. Left hip adductor pain/tendinitis  3. Left hip flexor pain  4. Trochanteric bursitis, left   5. IT band pain, left  S/p stress reaction of left femoral neck  hip abd/core weakness    Appears that her femur stress reaction pain has resolved and now she is having multiple areas of hip pain and tendinitis from core, hip, pelvis muscle weakness.    PLAN:  1. PT for left hip abd/core strengthening.  PT for extra-articular hip pain from adductor tendinitis, hip flexor tendinitis, psoas tightness, It band pain, trochanteric bursitis. PT for hip, core, abd, pelvic muscle stretching and strengthening x8-12 weeks with HEP.  2. OTC pain control as needed. Try ice or heat compresses    3. Continue cane for protection from falling    4. RTC in 6 weeks but come back immediately should she again have pain with isolated loading/weight  bearing (standing).       All questions were answered, pt will contact us for questions or concerns in the interim.    The total face-to-face encounter time with this patient was 60 minutes and greater than 50% of of the encounter time was spent counseling the patient and coordinating care. Significant time was also spent educating the patient, giving detail post-visit instructions, and discussing risks and benefits of treatment. Patient also had several specific questions that were answered during the visit today.     I made the decision to obtain old records of the patient including previous notes and imaging. New imaging was ordered today of the extremity or extremities evaluated. I independently reviewed and interpreted the radiographs and/or MRIs today as well as prior imaging.

## 2019-03-19 ENCOUNTER — LAB VISIT (OUTPATIENT)
Dept: LAB | Facility: HOSPITAL | Age: 59
End: 2019-03-19
Attending: SPECIALIST
Payer: MEDICARE

## 2019-03-19 DIAGNOSIS — N83.202 BILATERAL OVARIAN CYSTS: Primary | ICD-10-CM

## 2019-03-19 DIAGNOSIS — N83.201 BILATERAL OVARIAN CYSTS: Primary | ICD-10-CM

## 2019-03-19 LAB
ALBUMIN SERPL BCP-MCNC: 3.8 G/DL
ALP SERPL-CCNC: 77 U/L
ALT SERPL W/O P-5'-P-CCNC: 40 U/L
ANION GAP SERPL CALC-SCNC: 9 MMOL/L
AST SERPL-CCNC: 54 U/L
BILIRUB SERPL-MCNC: 0.4 MG/DL
BUN SERPL-MCNC: 15 MG/DL
CALCIUM SERPL-MCNC: 9.3 MG/DL
CANCER AG125 SERPL-ACNC: 11 U/ML
CHLORIDE SERPL-SCNC: 105 MMOL/L
CO2 SERPL-SCNC: 24 MMOL/L
CREAT SERPL-MCNC: 0.8 MG/DL
EST. GFR  (AFRICAN AMERICAN): >60 ML/MIN/1.73 M^2
EST. GFR  (NON AFRICAN AMERICAN): >60 ML/MIN/1.73 M^2
GLUCOSE SERPL-MCNC: 98 MG/DL
POTASSIUM SERPL-SCNC: 4.2 MMOL/L
PROT SERPL-MCNC: 6.6 G/DL
SODIUM SERPL-SCNC: 138 MMOL/L

## 2019-03-19 PROCEDURE — 36415 COLL VENOUS BLD VENIPUNCTURE: CPT | Mod: PO

## 2019-03-19 PROCEDURE — 80053 COMPREHEN METABOLIC PANEL: CPT

## 2019-03-19 PROCEDURE — 86304 IMMUNOASSAY TUMOR CA 125: CPT

## 2019-03-20 PROBLEM — M25.622 STIFFNESS OF LEFT ELBOW JOINT: Status: RESOLVED | Noted: 2019-01-10 | Resolved: 2019-03-20

## 2019-03-20 PROBLEM — R29.898 WEAKNESS OF LEFT ARM: Status: RESOLVED | Noted: 2019-01-10 | Resolved: 2019-03-20

## 2019-03-20 PROBLEM — M25.552 ACUTE HIP PAIN, LEFT: Status: ACTIVE | Noted: 2019-03-20

## 2019-03-20 PROBLEM — M25.522 PAIN IN LEFT ELBOW: Status: RESOLVED | Noted: 2018-11-01 | Resolved: 2019-03-20

## 2019-03-20 PROBLEM — R26.9 GAIT ABNORMALITY: Status: ACTIVE | Noted: 2019-03-20

## 2019-03-21 ENCOUNTER — TELEPHONE (OUTPATIENT)
Dept: RHEUMATOLOGY | Facility: CLINIC | Age: 59
End: 2019-03-21

## 2019-03-21 ENCOUNTER — OFFICE VISIT (OUTPATIENT)
Dept: DERMATOLOGY | Facility: CLINIC | Age: 59
End: 2019-03-21
Payer: MEDICARE

## 2019-03-21 DIAGNOSIS — L30.9 HAND ECZEMA: ICD-10-CM

## 2019-03-21 DIAGNOSIS — L81.4 LENTIGO: ICD-10-CM

## 2019-03-21 DIAGNOSIS — B07.8 COMMON WART: ICD-10-CM

## 2019-03-21 DIAGNOSIS — L82.1 SK (SEBORRHEIC KERATOSIS): ICD-10-CM

## 2019-03-21 DIAGNOSIS — M06.9 RHEUMATOID ARTHRITIS, INVOLVING UNSPECIFIED SITE, UNSPECIFIED RHEUMATOID FACTOR PRESENCE: ICD-10-CM

## 2019-03-21 DIAGNOSIS — H01.139 ECZEMATOUS DERMATITIS OF EYELID, UNSPECIFIED LATERALITY: ICD-10-CM

## 2019-03-21 DIAGNOSIS — L57.0 AK (ACTINIC KERATOSIS): Primary | ICD-10-CM

## 2019-03-21 DIAGNOSIS — D18.00 ANGIOMA: ICD-10-CM

## 2019-03-21 PROCEDURE — 99203 OFFICE O/P NEW LOW 30 MIN: CPT | Mod: 25,S$GLB,, | Performed by: DERMATOLOGY

## 2019-03-21 PROCEDURE — 99999 PR PBB SHADOW E&M-EST. PATIENT-LVL II: ICD-10-PCS | Mod: PBBFAC,,, | Performed by: DERMATOLOGY

## 2019-03-21 PROCEDURE — 17000 DESTRUCT PREMALG LESION: CPT | Mod: 59,S$GLB,, | Performed by: DERMATOLOGY

## 2019-03-21 PROCEDURE — 17110 DESTRUCTION B9 LES UP TO 14: CPT | Mod: S$GLB,,, | Performed by: DERMATOLOGY

## 2019-03-21 PROCEDURE — 17110 PR DESTRUCTION BENIGN LESIONS UP TO 14: ICD-10-PCS | Mod: S$GLB,,, | Performed by: DERMATOLOGY

## 2019-03-21 PROCEDURE — 17000 PR DESTRUCTION(LASER SURGERY,CRYOSURGERY,CHEMOSURGERY),PREMALIGNANT LESIONS,FIRST LESION: ICD-10-PCS | Mod: 59,S$GLB,, | Performed by: DERMATOLOGY

## 2019-03-21 PROCEDURE — 99999 PR PBB SHADOW E&M-EST. PATIENT-LVL II: CPT | Mod: PBBFAC,,, | Performed by: DERMATOLOGY

## 2019-03-21 PROCEDURE — 99203 PR OFFICE/OUTPT VISIT, NEW, LEVL III, 30-44 MIN: ICD-10-PCS | Mod: 25,S$GLB,, | Performed by: DERMATOLOGY

## 2019-03-21 RX ORDER — TRIAMCINOLONE ACETONIDE 1 MG/G
CREAM TOPICAL
Qty: 45 G | Refills: 1 | Status: SHIPPED | OUTPATIENT
Start: 2019-03-21 | End: 2019-04-11

## 2019-03-21 RX ORDER — LEFLUNOMIDE 20 MG/1
20 TABLET ORAL DAILY
Qty: 30 TABLET | Refills: 1 | Status: SHIPPED | OUTPATIENT
Start: 2019-03-21 | End: 2019-04-11 | Stop reason: SDUPTHER

## 2019-03-21 RX ORDER — PREDNISONE 1 MG/1
1 TABLET ORAL DAILY
Qty: 90 TABLET | Refills: 1 | Status: SHIPPED | OUTPATIENT
Start: 2019-03-21 | End: 2019-09-22 | Stop reason: SDUPTHER

## 2019-03-21 RX ORDER — PREDNISONE 5 MG/1
5 TABLET ORAL DAILY
Qty: 90 TABLET | Refills: 1 | Status: SHIPPED | OUTPATIENT
Start: 2019-03-21 | End: 2019-07-16

## 2019-03-21 NOTE — PROGRESS NOTES
"  Subjective:       Patient ID:  Joyce Peterson is a 58 y.o. female who presents for   Chief Complaint   Patient presents with    Skin Check    Lesion    Dry Skin     Pt here today for a TBSE. Pt c/o flesh colored  Lesions on face x afew years,. No bleeding, pain or prev tx.  Pt c/o flaky skin on both upper eyelids x many years. No prev tx.   Pt c/o dry skin on both hands x many years. Using otc lotion. Tx not helping.   Pt c/o lesions on hands a few years. No prev tx.  Pt just started a new immunosuppressant for RA- Dom    Patient is here today for a "mole" check.   Pt has a history of  extensive sun exposure in the past.   Pt recalls several blistering sunburns in the past- yes  Pt has history of tanning bed use- yes  Pt has  had moles removed in the past- yes, benign per   Pt has history of melanoma in first degree relatives-  Yes, father          Review of Systems   Constitutional: Negative for fever and chills.   Skin: Positive for itching, rash, dry skin, daily sunscreen use and activity-related sunscreen use. Negative for tendency to form keloidal scars and recent sunburn.   Hematologic/Lymphatic: Bruises/bleeds easily.        Objective:    Physical Exam   Constitutional: She appears well-developed and well-nourished.   Neurological: She is alert and oriented to person, place, and time.   Psychiatric: She has a normal mood and affect.   Skin:   Areas Examined (abnormalities noted in diagram):   Scalp / Hair Palpated and Inspected  Head / Face Inspection Performed  Neck Inspection Performed  Chest / Axilla Inspection Performed  Abdomen Inspection Performed  Genitals / Buttocks / Groin Inspection Performed  Back Inspection Performed  RUE Inspected  LUE Inspection Performed  RLE Inspected  LLE Inspection Performed  Nails and Digits Inspection Performed                       Diagram Legend     Erythematous scaling macule/papule c/w actinic keratosis       Vascular papule c/w angioma      Pigmented " verrucoid papule/plaque c/w seborrheic keratosis      Yellow umbilicated papule c/w sebaceous hyperplasia      Irregularly shaped tan macule c/w lentigo     1-2 mm smooth white papules consistent with Milia      Movable subcutaneous cyst with punctum c/w epidermal inclusion cyst      Subcutaneous movable cyst c/w pilar cyst      Firm pink to brown papule c/w dermatofibroma      Pedunculated fleshy papule(s) c/w skin tag(s)      Evenly pigmented macule c/w junctional nevus     Mildly variegated pigmented, slightly irregular-bordered macule c/w mildly atypical nevus      Flesh colored to evenly pigmented papule c/w intradermal nevus       Pink pearly papule/plaque c/w basal cell carcinoma      Erythematous hyperkeratotic cursted plaque c/w SCC      Surgical scar with no sign of skin cancer recurrence      Open and closed comedones      Inflammatory papules and pustules      Verrucoid papule consistent consistent with wart     Erythematous eczematous patches and plaques     Dystrophic onycholytic nail with subungual debris c/w onychomycosis     Umbilicated papule    Erythematous-base heme-crusted tan verrucoid plaque consistent with inflamed seborrheic keratosis     Erythematous Silvery Scaling Plaque c/w Psoriasis     See annotation      Assessment / Plan:        AK (actinic keratosis)  Cryosurgery Procedure Note    Verbal consent from the patient is obtained including, but not limited to, risk of hypopigmentation/hyperpigmentation, scar, recurrence of lesion. The patient is aware of the precancerous quality and need for treatment of these lesions. Liquid nitrogen cryosurgery is applied to the 1 actinic keratoses, as detailed in the physical exam, to produce a freeze injury. The patient is aware that blisters may form and is instructed on wound care with gentle cleansing and use of vaseline ointment to keep moist until healed. The patient is supplied a handout on cryosurgery and is instructed to call if lesions do not  completely resolve.    Common wart  Cryosurgery procedure note:    Verbal consent from the patient is obtained including, but not limited to, risk of hypopigmentation/hyperpigmentation, scar, recurrence of lesion. Liquid nitrogen cryosurgery is applied to 2 verruca , as detailed in the physical exam, to produce a freeze injury. 1 consecutive freeze thaw cycles are applied to each verruca. The patient is aware that blisters (possibly blood blisters) may form.    Lentigo  /This is a benign hyperpigmented sun induced lesion. Daily sun protection will reduce the number of new lesions. Treatment of these benign lesions are considered cosmetic.  The nature of sun-induced photo-aging and skin cancers is discussed.  Sun avoidance, protective clothing, and the use of 30-SPF sunscreens is advised. Observe closely for skin damage/changes, and call if such occurs.    Angioma  These are benign vascular lesions that are inherited.  Treatment is not necessary.    SK (seborrheic keratosis)  These are benign inherited growths without a malignant potential. Reassurance given to patient. No treatment is necessary.     Eczematous dermatitis of eyelid, unspecified laterality  Hydrocortisone cream otc then vaseline , vaseline for maintenance     Hand eczema  -     triamcinolone acetonide 0.1% (KENALOG) 0.1 % cream; AAA qhs to knuckles then vaseline prn flare; not more than 2 weeks straight in same location, avoid use on face and groin  Dispense: 45 g; Refill: 1  Tac cream then vaseline prn flare    Total body skin examination performed today including at least 12 points as noted in physical examination. No lesions suspicious for malignancy noted.           Follow-up in about 1 year (around 3/21/2020).

## 2019-03-21 NOTE — TELEPHONE ENCOUNTER
Ronel, please find out how much prednisone she is currently taking so I can refill Rx. Thanks MEGA

## 2019-03-27 ENCOUNTER — OFFICE VISIT (OUTPATIENT)
Dept: HEMATOLOGY/ONCOLOGY | Facility: CLINIC | Age: 59
End: 2019-03-27
Payer: MEDICARE

## 2019-03-27 ENCOUNTER — INFUSION (OUTPATIENT)
Dept: INFECTIOUS DISEASES | Facility: HOSPITAL | Age: 59
End: 2019-03-27
Attending: INTERNAL MEDICINE
Payer: MEDICARE

## 2019-03-27 VITALS — BODY MASS INDEX: 28.31 KG/M2 | HEIGHT: 61 IN | WEIGHT: 149.94 LBS

## 2019-03-27 VITALS
SYSTOLIC BLOOD PRESSURE: 114 MMHG | BODY MASS INDEX: 29.92 KG/M2 | TEMPERATURE: 99 F | WEIGHT: 158.5 LBS | OXYGEN SATURATION: 96 % | HEART RATE: 100 BPM | HEIGHT: 61 IN | DIASTOLIC BLOOD PRESSURE: 70 MMHG

## 2019-03-27 DIAGNOSIS — M85.80 OSTEOPENIA, UNSPECIFIED LOCATION: Primary | ICD-10-CM

## 2019-03-27 DIAGNOSIS — D50.0 IRON DEFICIENCY ANEMIA DUE TO CHRONIC BLOOD LOSS: Primary | ICD-10-CM

## 2019-03-27 PROCEDURE — 99214 OFFICE O/P EST MOD 30 MIN: CPT | Mod: S$GLB,,, | Performed by: INTERNAL MEDICINE

## 2019-03-27 PROCEDURE — 99999 PR PBB SHADOW E&M-EST. PATIENT-LVL V: CPT | Mod: PBBFAC,,, | Performed by: INTERNAL MEDICINE

## 2019-03-27 PROCEDURE — 63600175 PHARM REV CODE 636 W HCPCS: Mod: JG | Performed by: INTERNAL MEDICINE

## 2019-03-27 PROCEDURE — 99214 PR OFFICE/OUTPT VISIT, EST, LEVL IV, 30-39 MIN: ICD-10-PCS | Mod: S$GLB,,, | Performed by: INTERNAL MEDICINE

## 2019-03-27 PROCEDURE — 99999 PR PBB SHADOW E&M-EST. PATIENT-LVL V: ICD-10-PCS | Mod: PBBFAC,,, | Performed by: INTERNAL MEDICINE

## 2019-03-27 PROCEDURE — 3008F PR BODY MASS INDEX (BMI) DOCUMENTED: ICD-10-PCS | Mod: CPTII,S$GLB,, | Performed by: INTERNAL MEDICINE

## 2019-03-27 PROCEDURE — 96372 THER/PROPH/DIAG INJ SC/IM: CPT

## 2019-03-27 PROCEDURE — 3008F BODY MASS INDEX DOCD: CPT | Mod: CPTII,S$GLB,, | Performed by: INTERNAL MEDICINE

## 2019-03-27 RX ADMIN — DENOSUMAB 60 MG: 60 INJECTION SUBCUTANEOUS at 01:03

## 2019-03-27 NOTE — PROGRESS NOTES
SECTION OF HEMATOLOGY AND BONE MARROW TRANSPLANT  Return  Patient Visit   03/28/2019  Referred by:  No ref. provider found  Referred for: ROBERT    CHIEF COMPLAINT:   Chief Complaint   Patient presents with    Other iron deficiency anemia       HISTORY OF PRESENT ILLNESS:   58 y.o. female Referred to me November 2017  for ROBERT.  Intolerant to po iron.  Iron studies in march 2017 with severe ID.  History RA followed by Dr. Valverde.  History of gastroparesis followed by Dr. Moore in GI.  Denies fever, chills, nightsweats, bleeding, brusing, lymphadenopathy, signs/symptoms of splenomegaly.   Had upper and lower endoscopy summer 2017 unremarkable.  No VCE done.  Denies any overt GI bleeding or other bleeding.    S/p repeat IV feraheme x 2 January 2018.  Tolerated well.   Maintained ferritin and hgb since that time. Recent change in RA therapy with mild improvement after just 2 injections.       PAST MEDICAL HISTORY:   Past Medical History:   Diagnosis Date    Acid reflux     Allergy     Anemia     Asthma     Coronary artery disease     Degenerative disc disease     Dry eyes     Dry mouth     Gastroparesis     Hyperlipidemia     Lateral meniscus derangement 4/6/2016    Lobular carcinoma in situ     Osteoarthritis     Osteoporosis     Rheumatoid arthritis(714.0)     Umbilical hernia 8/13/2015       PAST SURGICAL HISTORY:   Past Surgical History:   Procedure Laterality Date    ARTHROSCOPY-KNEE Right 4/6/2016    Performed by [facility] OR 2ND FLR    BREAST BIOPSY Left 01/29/2002    core bx    CARPAL TUNNEL RELEASE Right 05/2017    CHOLECYSTECTOMY  2004    COLONOSCOPY      10/11    COLONOSCOPY N/A 6/29/2017    Performed by López Moore MD at [facility] ENDO (4TH FLR)    ESOPHAGOGASTRODUODENOSCOPY (EGD) N/A 6/29/2017    Performed by L[facility]pez Moore MD at [facility] ENDO (4TH FLR)    RELEASE-CARPAL TUNNEL / Right Right 5/29/2017    Performed by [facility] at [facility] OR    REPAIR,  TENDON, TRICEPS left elbow Left 10/17/2018    Per[facility] at [facility] OR 1ST FLR    REPAIR-HERNIA-UMBILICAL N/A 8/13/2015    Performed by Ricardo Small [facility] at [facility] OR 2ND FLR    TONSILLECTOMY      TUBAL LIGATION  2003    UPPER GASTROINTESTINAL ENDOSCOPY      10/11    uterine ablation  2003       PAST SOCIAL HISTORY:   reports that she has never smoked. She has never used smokeless tobacco. She reports that she drinks alcohol. She reports that she does not use drugs.    FAMILY HISTORY:  Family History   Problem Relation Age of Onset    Cancer Mother         Lung Cancer    Emphysema Mother     Heart attack Mother     Cancer Father         Lung Cancer    Osteoarthritis Father     Lung cancer Father     Skin cancer Father     Heart disease Brother     Heart attack Brother     Osteoarthritis Paternal Aunt     Retinal detachment Maternal Aunt     No Known Problems Sister     No Known Problems Maternal Uncle     No Known Problems Paternal Uncle     No Known Problems Maternal Grandmother     No Known Problems Maternal Grandfather     No Known Problems Paternal Grandmother     No Known Problems Paternal Grandfather     Colon cancer Neg Hx     Esophageal cancer Neg Hx     Stomach cancer Neg Hx     Celiac disease Neg Hx     Diabetes Neg Hx     Thyroid disease Neg Hx     Amblyopia Neg Hx     Blindness Neg Hx     Cataracts Neg Hx     Glaucoma Neg Hx     Hypertension Neg Hx     Macular degeneration Neg Hx     Strabismus Neg Hx     Stroke Neg Hx        CURRENT MEDICATIONS:   Current Outpatient Medications   Medication Sig    aspirin 81 mg Tab Take 81 mg by mouth every morning.     azelastine (ASTELIN) 137 mcg nasal spray 1 spray (137 mcg total) by Nasal route 2 (two) times daily. (Patient taking differently: 1 spray by Nasal route 2 (two) times daily as needed. )    azithromycin (Z-DEVON) 250 MG tablet Take 2 tablets by mouth on day 1; Take 1 tablet by mouth on days  2-5    budesonide-formoterol 160-4.5 mcg (SYMBICORT) 160-4.5 mcg/actuation HFAA Inhale 2 puffs into the lungs every 12 (twelve) hours.    calcium citrate-vitamin D (CITRACAL + D) 315-200 mg-unit per tablet Take 1 tablet by mouth 2 (two) times daily.     ciprofloxacin HCl (CIPRO) 500 MG tablet Take 1 tablet by mouth twice daily.    diazePAM (VALIUM) 5 MG tablet Take once for MRI    diclofenac sodium (VOLTAREN) 1 % Gel Apply 2 g topically 4 (four) times daily as needed.    docusate sodium (COLACE) 100 MG capsule Take 1 capsule (100 mg total) by mouth 3 (three) times daily.    fish oil-omega-3 fatty acids 300-1,000 mg capsule Take 1,400 g by mouth once daily.    flaxseed oil 1,000 mg Cap Take by mouth once daily.    flu vac [id number] 36mos up,PF, 60 mcg (15 mcg x 4)/0.5 mL Syrg To be administered by Pharm D    fluconazole (DIFLUCAN) 100 MG tablet Take 100 mg by mouth once daily.    fluticasone (FLONASE) 50 mcg/actuation nasal spray     GUAIFENESIN (MUCINEX ORAL) Take 400 mg by mouth every 4 (four) hours as needed.     INV PROPULSID 10 MG Take 20 mg by mouth 4 (four) times daily. FOR INVESTIGATIONAL USE ONLY. Protocol [id number]    leflunomide (ARAVA) 20 MG Tab Take 1 tablet (20 mg total) by mouth once daily.    montelukast (SINGULAIR) 10 mg tablet Take 1 tablet (10 mg total) by mouth nightly.    naproxen (NAPROSYN) 500 MG tablet TAKE 1 TABLET TWICE A DAY AS NEEDED    neomycin-polymyxin-dexamethasone (DEXACINE) 3.5 mg/g-10,000 unit/g-0.1 % Oint Apply to affected external eyelid tissue one or two times per day for ten days    omeprazole (PRILOSEC) 40 MG capsule TAKE ONE CAPSULE BY MOUTH EVERY MORNING    omeprazole-sodium bicarbonate (ZEGERID) 40-1.1 mg-gram per capsule TAKE 1 CAPSULE BY MOUTH EVERY MORNING.    phenazopyridine (PYRIDIUM) 200 MG tablet Take 1 tablet by mouth four times daily as needed for urinary pain.    polyethylene glycol (GLYCOLAX) 17 gram/dose powder Mix 1 capful (17 grams total)  "with liquid and take by mouth once daily.    POTASSIUM ORAL Take by mouth once daily.    predniSONE (DELTASONE) 1 MG tablet Take 1 tablet (1 mg total) by mouth once daily.    predniSONE (DELTASONE) 5 MG tablet Take 1 tablet (5 mg total) by mouth once daily.    PREMARIN 0.625 mg tablet Take 0.625 mg by mouth once daily.    progesterone (PROMETRIUM) 100 MG capsule Take 100 mg by mouth every morning. 1 Capsule Oral Every day, morning    PROVENTIL HFA 90 mcg/actuation inhaler INHALE 2 PUFFS EVERY 6 HOURS AS NEEDED    RESTASIS 0.05 % ophthalmic emulsion PLACE 0.4 MLS (1 DROP TOTAL) INTO BOTH EYES 2 (TWO) TIMES DAILY.    rizatriptan (MAXALT) 10 MG tablet Take 10 mg by mouth as needed for Migraine.     rosuvastatin (CRESTOR) 20 MG tablet TAKE 1 TABLET EVERY DAY (Patient taking differently: TAKE 1 TABLET EVERY evening)    sarilumab (KEVZARA) 200 mg/1.14 mL PnIj Inject 1.14 mLs (200 mg total) into the skin every 14 (fourteen) days.    sarilumab (KEVZARA) 200 mg/1.14 mL Syrg Inject 1.14 mLs (200 mg total) into the skin every 14 (fourteen) days.    sucralfate (CARAFATE) 1 gram tablet TAKE 1 TABLET (1 G TOTAL) BY MOUTH 4 (FOUR) TIMES DAILY.    syringe with needle (TUBERCULIN SYRINGE) 1 mL 27 x 1/2" Syrg To administer methotrexate 7.5mg sc once a week    traMADol (ULTRAM) 50 mg tablet Take 1 tablet (50 mg total) by mouth every 6 (six) hours.    triamcinolone acetonide 0.1% (KENALOG) 0.1 % cream AAA qhs to knuckles then vaseline prn flare; not more than 2 weeks straight in same location, avoid use on face and groin    trospium (SANCTURA) 20 mg Tab tablet Take 1 tablet (20 mg total) by mouth 2 (two) times daily.    valACYclovir (VALTREX) 1000 MG tablet TAKE 1 TABLET BY MOUTH TWICE A DAY AS NEEDED    varicella-zoster [id number], PF, (SHINGRIX, PF,) 50 mcg/0.5 mL injection Inject into the muscle.     No current facility-administered medications for this visit.      Facility-Administered Medications Ordered in Other " "Visits   Medication    0.9%  NaCl infusion     ALLERGIES:   Review of patient's allergies indicates:   Allergen Reactions    Actemra [tocilizumab] Other (See Comments)     Severe dizziness    Codeine Nausea And Vomiting    Gold au 198 Hives and Rash    Hydroxychloroquine Other (See Comments)     Can't remember the reaction      Iodinated contrast- oral and iv dye Other (See Comments)     Other reaction(s): BURNING ALL OVER    Iodine Other (See Comments)     Other reaction(s): BURNING ALL OVER - Iodine dye - Not topical    Sulfa (sulfonamide antibiotics) Other (See Comments)     Can't remember the reaction    Zofran [ondansetron hcl (pf)] Nausea And Vomiting     Pt reports that last time she received zofran she started vomiting again    Methotrexate analogues Nausea Only    Pneumovax 23 [pneumococcal 23-argenis ps vaccine] Other (See Comments)     "sick"             REVIEW OF SYSTEMS:   General ROS: negative  Psychological ROS: negative  Ophthalmic ROS: negative  ENT ROS: negative  Allergy and Immunology ROS: negative  Hematological and Lymphatic ROS: negative  Endocrine ROS: negative  Respiratory ROS: negative  Cardiovascular ROS: negative  Gastrointestinal ROS: see HPI  Genito-Urinary ROS: negative  Musculoskeletal ROS: see HPI  Neurological ROS: negative  Dermatological ROS: negative    PHYSICAL EXAM:   Vitals:    03/27/19 1405   BP: 114/70   Pulse: 100   Temp: 98.5 °F (36.9 °C)       General - well developed, well nourished, no apparent distress  Head & Face - no sinus tenderness  Eyes - normal conjunctivae and lids   ENT - normal external auditory canals and tympanic membranes bilaterally oropharynx clear,  Normal dentition and gums  Neck - normal thyroid  Chest and Lung - normal respiratory effort, clear to auscultation bilaterally   Cardiovascular - RRR with no MGR, normal S1 and S2; no pedal edema  Abdomen -  soft, nontender, no palpable hepatomegaly or splenomegaly  Lymph - no palpable " lymphadenopathy  Extremities - changes of RA   Heme - no bruising, petechiae, pallor  Skin - no rashes or lesions  Psych - appropriate mood and affect      ECOG Performance Status: (foot note - ECOG PS provided by [employer]cology [employer]) 1 - Symptomatic but completely ambulatory    Karnofsky Performance Score:  90%- Able to Carry on Normal Activity: Minor Symptoms of Disease  DATA:   Lab Results   Component Value Date    WBC 5.41 03/27/2019    HGB 13.0 03/27/2019    HCT 40.5 03/27/2019    MCV 90 03/27/2019     03/27/2019     Gran # (ANC)   Date Value Ref Range Status   03/27/2019 3.6 1.8 - 7.7 K/uL Final     Gran%   Date Value Ref Range Status   03/27/2019 66.4 38.0 - 73.0 % Final     CMP  Sodium   Date Value Ref Range Status   03/19/2019 138 136 - 145 mmol/L Final     Potassium   Date Value Ref Range Status   03/19/2019 4.2 3.5 - 5.1 mmol/L Final     Chloride   Date Value Ref Range Status   03/19/2019 105 95 - 110 mmol/L Final     CO2   Date Value Ref Range Status   03/19/2019 24 23 - 29 mmol/L Final     Glucose   Date Value Ref Range Status   03/19/2019 98 70 - 110 mg/dL Final     BUN, Bld   Date Value Ref Range Status   03/19/2019 15 6 - 20 mg/dL Final     Creatinine   Date Value Ref Range Status   03/19/2019 0.8 0.5 - 1.4 mg/dL Final     Calcium   Date Value Ref Range Status   03/19/2019 9.3 8.7 - 10.5 mg/dL Final     Total Protein   Date Value Ref Range Status   03/19/2019 6.6 6.0 - 8.4 g/dL Final     Albumin   Date Value Ref Range Status   03/19/2019 3.8 3.5 - 5.2 g/dL Final     Total Bilirubin   Date Value Ref Range Status   03/19/2019 0.4 0.1 - 1.0 mg/dL Final     Comment:     For infants and newborns, interpretation of results should be based  on gestational age, weight and in agreement with clinical  observations.  Premature Infant recommended reference ranges:  Up to 24 hours.............<8.0 mg/dL  Up to 48 hours............<12.0 mg/dL  3-5 days..................<15.0 mg/dL  6-29  days.................<15.0 mg/dL       Alkaline Phosphatase   Date Value Ref Range Status   03/19/2019 77 55 - 135 U/L Final     AST   Date Value Ref Range Status   03/19/2019 54 (H) 10 - 40 U/L Final     ALT   Date Value Ref Range Status   03/19/2019 40 10 - 44 U/L Final     Anion Gap   Date Value Ref Range Status   03/19/2019 9 8 - 16 mmol/L Final     eGFR if    Date Value Ref Range Status   03/19/2019 >60.0 >60 mL/min/1.73 m^2 Final     eGFR if non    Date Value Ref Range Status   03/19/2019 >60.0 >60 mL/min/1.73 m^2 Final     Comment:     Calculation used to obtain the estimated glomerular filtration  rate (eGFR) is the CKD-EPI equation.        Lab Results   Component Value Date    IRON 22 (L) 11/13/2017    TIBC 545 (H) 11/13/2017    FERRITIN 24 03/27/2019         ASSESSMENT AND PLAN:   Encounter Diagnosis   Name Primary?    Iron deficiency anemia due to chronic blood loss Yes        Microcytic anemia likely ROBERT  due to malabsorption related to GI issues ; possible anemia of chronic inflammation from RA and meds    contributing  Denies any over GI bleeding; Recent summer 2017 upper and lower GI endoscopy negative; VCE deferred but may need if ROBERT persists  Intolerant to po iron   S/p feraheme x 2 jan 2018 with normalization of hgb and ferritin which remains today  Given down trending ferritin will continue serial labs   Continue fu with GI  Fu with rheum for RA  Recommend serial lab monitoring for further prn iron infusion needs particularly in light of down trending ferritin       Follow Up:     -cbc, ferritin lab only in 3 months  -same labs and md appt in 6   months    Michael Woodson MD  Hematology/Oncology/Bone Marrow Transplant

## 2019-04-03 ENCOUNTER — PATIENT MESSAGE (OUTPATIENT)
Dept: PODIATRY | Facility: CLINIC | Age: 59
End: 2019-04-03

## 2019-04-04 DIAGNOSIS — R26.9 GAIT ABNORMALITY: ICD-10-CM

## 2019-04-04 DIAGNOSIS — M06.9 RHEUMATOID ARTHRITIS INVOLVING MULTIPLE SITES, UNSPECIFIED RHEUMATOID FACTOR PRESENCE: Primary | ICD-10-CM

## 2019-04-04 DIAGNOSIS — Z87.312 HISTORY OF STRESS FRACTURE: ICD-10-CM

## 2019-04-08 ENCOUNTER — TELEPHONE (OUTPATIENT)
Dept: PAIN MEDICINE | Facility: CLINIC | Age: 59
End: 2019-04-08

## 2019-04-08 ENCOUNTER — OFFICE VISIT (OUTPATIENT)
Dept: SPORTS MEDICINE | Facility: CLINIC | Age: 59
End: 2019-04-08
Payer: MEDICARE

## 2019-04-08 VITALS
HEIGHT: 61 IN | WEIGHT: 158 LBS | BODY MASS INDEX: 29.83 KG/M2 | HEART RATE: 96 BPM | SYSTOLIC BLOOD PRESSURE: 145 MMHG | DIASTOLIC BLOOD PRESSURE: 78 MMHG

## 2019-04-08 DIAGNOSIS — M25.552 CHRONIC LEFT HIP PAIN: ICD-10-CM

## 2019-04-08 DIAGNOSIS — M76.32 ILIOTIBIAL BAND TENDINITIS OF LEFT SIDE: ICD-10-CM

## 2019-04-08 DIAGNOSIS — M76.892 ADDUCTOR TENDINITIS OF LEFT HIP: Primary | ICD-10-CM

## 2019-04-08 DIAGNOSIS — G89.29 CHRONIC LEFT HIP PAIN: ICD-10-CM

## 2019-04-08 DIAGNOSIS — M76.892 HIP FLEXOR TENDINITIS, LEFT: ICD-10-CM

## 2019-04-08 DIAGNOSIS — M70.62 TROCHANTERIC BURSITIS OF LEFT HIP: ICD-10-CM

## 2019-04-08 PROCEDURE — 99999 PR PBB SHADOW E&M-EST. PATIENT-LVL V: ICD-10-PCS | Mod: PBBFAC,,, | Performed by: PHYSICIAN ASSISTANT

## 2019-04-08 PROCEDURE — 99214 OFFICE O/P EST MOD 30 MIN: CPT | Mod: S$GLB,,, | Performed by: PHYSICIAN ASSISTANT

## 2019-04-08 PROCEDURE — 3008F BODY MASS INDEX DOCD: CPT | Mod: CPTII,S$GLB,, | Performed by: PHYSICIAN ASSISTANT

## 2019-04-08 PROCEDURE — 3008F PR BODY MASS INDEX (BMI) DOCUMENTED: ICD-10-PCS | Mod: CPTII,S$GLB,, | Performed by: PHYSICIAN ASSISTANT

## 2019-04-08 PROCEDURE — 99214 PR OFFICE/OUTPT VISIT, EST, LEVL IV, 30-39 MIN: ICD-10-PCS | Mod: S$GLB,,, | Performed by: PHYSICIAN ASSISTANT

## 2019-04-08 PROCEDURE — 99999 PR PBB SHADOW E&M-EST. PATIENT-LVL V: CPT | Mod: PBBFAC,,, | Performed by: PHYSICIAN ASSISTANT

## 2019-04-08 NOTE — TELEPHONE ENCOUNTER
Spoke with patient to schedule in office left hip adductor injection, patient refused next available date, stated she needs it done sooner that 04/24/2019 because she is going out of 2 weeks, message sent to Dr Mayfield

## 2019-04-08 NOTE — TELEPHONE ENCOUNTER
----- Message from Jett Julien III, PA-C sent at 4/8/2019  2:21 PM CDT -----  Regarding: patient to get injection schedule with Dr. Mayfield    Please call patient to schedule a left hip adductor tendon injection using steroid and lidocaine.     Dr. Mayfield.     Thank you,    Khurram

## 2019-04-10 ENCOUNTER — PATIENT MESSAGE (OUTPATIENT)
Dept: RHEUMATOLOGY | Facility: CLINIC | Age: 59
End: 2019-04-10

## 2019-04-10 ENCOUNTER — TELEPHONE (OUTPATIENT)
Dept: PAIN MEDICINE | Facility: CLINIC | Age: 59
End: 2019-04-10

## 2019-04-10 NOTE — TELEPHONE ENCOUNTER
Spoke with patient to schedule an in office injection for today, patient stated she can not make it because she has to care for her autistic grandchild

## 2019-04-11 ENCOUNTER — OFFICE VISIT (OUTPATIENT)
Dept: RHEUMATOLOGY | Facility: CLINIC | Age: 59
End: 2019-04-11
Payer: MEDICARE

## 2019-04-11 ENCOUNTER — TELEPHONE (OUTPATIENT)
Dept: PHARMACY | Facility: CLINIC | Age: 59
End: 2019-04-11

## 2019-04-11 ENCOUNTER — TELEPHONE (OUTPATIENT)
Dept: RHEUMATOLOGY | Facility: CLINIC | Age: 59
End: 2019-04-11

## 2019-04-11 VITALS
HEIGHT: 64 IN | HEART RATE: 97 BPM | SYSTOLIC BLOOD PRESSURE: 139 MMHG | DIASTOLIC BLOOD PRESSURE: 88 MMHG | WEIGHT: 158.69 LBS | BODY MASS INDEX: 27.09 KG/M2

## 2019-04-11 DIAGNOSIS — M25.552 PAIN OF LEFT HIP JOINT: Primary | ICD-10-CM

## 2019-04-11 DIAGNOSIS — M05.79 RHEUMATOID ARTHRITIS INVOLVING MULTIPLE SITES WITH POSITIVE RHEUMATOID FACTOR: ICD-10-CM

## 2019-04-11 DIAGNOSIS — M25.552 LEFT HIP PAIN: ICD-10-CM

## 2019-04-11 DIAGNOSIS — M06.9 RHEUMATOID ARTHRITIS, INVOLVING UNSPECIFIED SITE, UNSPECIFIED RHEUMATOID FACTOR PRESENCE: ICD-10-CM

## 2019-04-11 DIAGNOSIS — M85.80 OSTEOPENIA, UNSPECIFIED LOCATION: ICD-10-CM

## 2019-04-11 PROCEDURE — 99999 PR PBB SHADOW E&M-EST. PATIENT-LVL V: ICD-10-PCS | Mod: PBBFAC,,, | Performed by: INTERNAL MEDICINE

## 2019-04-11 PROCEDURE — 99214 PR OFFICE/OUTPT VISIT, EST, LEVL IV, 30-39 MIN: ICD-10-PCS | Mod: 25,S$GLB,, | Performed by: INTERNAL MEDICINE

## 2019-04-11 PROCEDURE — 96372 THER/PROPH/DIAG INJ SC/IM: CPT | Mod: S$GLB,,, | Performed by: INTERNAL MEDICINE

## 2019-04-11 PROCEDURE — 3008F BODY MASS INDEX DOCD: CPT | Mod: CPTII,S$GLB,, | Performed by: INTERNAL MEDICINE

## 2019-04-11 PROCEDURE — 96372 PR INJECTION,THERAP/PROPH/DIAG2ST, IM OR SUBCUT: ICD-10-PCS | Mod: S$GLB,,, | Performed by: INTERNAL MEDICINE

## 2019-04-11 PROCEDURE — 99999 PR PBB SHADOW E&M-EST. PATIENT-LVL V: CPT | Mod: PBBFAC,,, | Performed by: INTERNAL MEDICINE

## 2019-04-11 PROCEDURE — 3008F PR BODY MASS INDEX (BMI) DOCUMENTED: ICD-10-PCS | Mod: CPTII,S$GLB,, | Performed by: INTERNAL MEDICINE

## 2019-04-11 PROCEDURE — 99214 OFFICE O/P EST MOD 30 MIN: CPT | Mod: 25,S$GLB,, | Performed by: INTERNAL MEDICINE

## 2019-04-11 RX ORDER — TRIAMCINOLONE ACETONIDE 40 MG/ML
40 INJECTION, SUSPENSION INTRA-ARTICULAR; INTRAMUSCULAR
Status: COMPLETED | OUTPATIENT
Start: 2019-04-11 | End: 2019-04-11

## 2019-04-11 RX ORDER — LEFLUNOMIDE 20 MG/1
20 TABLET ORAL DAILY
Qty: 30 TABLET | Refills: 1 | Status: SHIPPED | OUTPATIENT
Start: 2019-04-11 | End: 2019-07-22 | Stop reason: SDUPTHER

## 2019-04-11 RX ORDER — TRAMADOL HYDROCHLORIDE 50 MG/1
50 TABLET ORAL
Qty: 30 TABLET | Refills: 0 | Status: SHIPPED | OUTPATIENT
Start: 2019-04-11 | End: 2019-07-08

## 2019-04-11 RX ADMIN — TRIAMCINOLONE ACETONIDE 40 MG: 40 INJECTION, SUSPENSION INTRA-ARTICULAR; INTRAMUSCULAR at 10:04

## 2019-04-11 ASSESSMENT — ROUTINE ASSESSMENT OF PATIENT INDEX DATA (RAPID3)
MDHAQ FUNCTION SCORE: 1.9
WHEN YOU AWAKENED IN THE MORNING OVER THE LAST WEEK, PLEASE INDICATE THE AMOUNT OF TIME IT TAKES UNTIL YOU ARE AS LIMBER AS YOU WILL BE FOR THE DAY: 24 HRS
FATIGUE SCORE: 6
AM STIFFNESS SCORE: 1, YES
TOTAL RAPID3 SCORE: 7.78
PATIENT GLOBAL ASSESSMENT SCORE: 9
PAIN SCORE: 8
PSYCHOLOGICAL DISTRESS SCORE: 2.2

## 2019-04-11 ASSESSMENT — DISEASE ACTIVITY SCORE (DAS28)
SWOLLEN_JOINTS_COUNT: 2
TOTAL_SCORE_CRP: 5.51
CRP_MG_PER_LITER: .1
ESR_MM_PER_HR: 1
TOTAL_SCORE_ESR: 4.51
GLOBAL_HEALTH_SCORE: 90
TENDER_JOINTS_COUNT: 26

## 2019-04-11 NOTE — PROGRESS NOTES
"Subjective:       Patient ID: Joyce Peterson is a 58 y.o. female.    Chief Complaint: RA; fibromyalgia bilateral hip pain  HPI  Major complaint is bilateral hip pain. Has been to PT x 4 wks with minimal improvement. Has seen Jett Julien in Sports Medicine and Dr. Duff prior to that in Ortho. Not satisfied. Having trouble getting rings on and off due to pip enlargement. Started sarilumab 200mg sc q 2wks( 3 injections so far, 4th next week) ? Helping. Still on prednisone 5mg daily occ 7-8mg daily. Just had denosumab 3/27/19  Review of Systems   Constitutional: Positive for fatigue. Negative for appetite change, fever and unexpected weight change.   HENT: Negative for mouth sores.         Dry mouth   Eyes: Positive for redness. Negative for visual disturbance.        Dry     Respiratory: Negative for cough, shortness of breath and wheezing.    Cardiovascular: Negative for chest pain and palpitations.   Gastrointestinal: Negative for abdominal pain, anal bleeding, blood in stool, constipation, diarrhea, nausea and vomiting.        Heartburn   Genitourinary: Negative for dysuria, frequency and urgency.   Musculoskeletal: Negative for arthralgias, back pain, gait problem, joint swelling, myalgias, neck pain and neck stiffness.   Skin: Negative for rash.   Neurological: Negative for weakness, numbness and headaches.   Hematological: Negative for adenopathy. Does not bruise/bleed easily.   Psychiatric/Behavioral: Negative for sleep disturbance. The patient is not nervous/anxious.          Objective:   /88   Pulse 97   Ht 5' 3.6" (1.615 m)   Wt 72 kg (158 lb 11.2 oz)   LMP  (LMP Unknown)   BMI 27.58 kg/m²      Physical Exam   Constitutional: She is oriented to person, place, and time and well-developed, well-nourished, and in no distress.   HENT:   Head: Normocephalic and atraumatic.   Mouth/Throat: Oropharynx is clear and moist.   Eyes: Conjunctivae and EOM are normal.   Neck: Normal range of motion. " Neck supple. No thyromegaly present.   Cardiovascular: Normal rate, regular rhythm, normal heart sounds and intact distal pulses.  Exam reveals no gallop and no friction rub.    No murmur heard.  Pulmonary/Chest: Breath sounds normal. She has no wheezes. She has no rales. She exhibits no tenderness.   Abdominal: Soft. She exhibits no distension and no mass. There is no tenderness.       Right Side Rheumatological Exam     Examination finds the shoulder and elbow normal.    She has swelling of the knee    The patient has an enlarged wrist, knee, 1st PIP, 1st MCP, 2nd PIP, 2nd MCP, 3rd PIP, 3rd MCP, 4th PIP, 4th MCP, 5th PIP and 5th MCP    Left Side Rheumatological Exam     Examination finds the shoulder and elbow normal.    She has swelling of the knee    The patient has an enlarged wrist, knee, 1st PIP, 1st MCP, 2nd PIP, 2nd MCP, 3rd PIP, 3rd MCP, 4th PIP, 4th MCP, 5th PIP and 5th MCP.      Lymphadenopathy:     She has no cervical adenopathy.   Neurological: She is alert and oriented to person, place, and time. She displays normal reflexes. Gait normal.   Nl motor strength UE and LE bilateral   Musculoskeletal: She exhibits no edema.         Results for TONNY GOMEZ (MRN [id number]) as of 4/11/2019 09:26   Ref. Range 2/28/2019 11:39   Mitogen - Nil Latest Ref Range: See text IU/mL 8.260   NIL Latest Ref Range: See text IU/mL 0.040   TB Gold Plus Unknown Negative   TB1 - Nil Latest Ref Range: See text IU/mL 0.050   TB2 - Nil Latest Ref Range: See text IU/mL 0.010     Assessment/Plan         Problem List Items Addressed This Visit     Rheumatoid arthritis involving multiple sites    Overview     erosive RA discontinued long term methotrexate b/o nausea with po and sc and reduced dose, allergic to sulfa, and had reaction to hydroxychloroquine;   tolerating leflunomide and tofacitinib.  Has failed 3 TNFi(infliximab, etanercept, adalimumab), abatacept, hypogamma with rituximab and gets frequent infections in any  event, acute vertigo/dizziness with tocilizumab x2           Current Assessment & Plan     TJ 26  SJ 2  Pt global 90  ESR <2  CRP <0.1  DAS28    4.51(moderate) CIT58-JDW 5.51(HDA) but all joints tender: fibromyalgia and OA) falsely elevating.  [id number]      CDAI    *Kenalog 40mg IM  sarilumab 200mg sc q 2 wks  leflunomide 20mg daily  Naproxen 500mg twice daily prn with omeprazole 40mg daily  Prednisone 5mg daily  *Shingrix #2 after 19  RTC 3 months with standing labs             Osteopenia    Overview     Please tell pt the bone density shows osteopenia which should be treated given her current dose of prednisone. Please schedule brief f/u visit to discuss in next several weeks. Thanks. [facility]          PACS Images     Show images for DXA Bone Density Spine And Hip_Axial Skeleton   Reviewed By Asaf Moran MD on 2017 13:37   Patient Result Comments     Viewed by Joyce Peterson on 2017  4:35 PM   Written by Isiah Moran MD on 2017  1:37 PM   The bone density shows osteopenia which should be treated given your current dose of prednisone. Ronel will schedule brief visit to discuss therapy options for this. [facility]   External Result Report     External Result Report   Narrative     : 1960 ORDERING PHYSICIAN: JARON Moran LOCATION: [facility]    HISTORY: 55 y/o female with no hx of fractures. Pts Mother had a wrist fx. Pt is taking Calcium, Vit D, Estrogen and 7mg of Prednisone. She has a hx of Rheumatoid Arthritis and Asthma. She exercises 3 times a week and does not smoke.    TECHNIQUE: Bone Mineral Density performed using Hologic Horizon A (S/N [id number]) reveals good positioning of lumbar spine and hip.    BONE MINERAL DENSITY RESULTS:  Lumbar Spine: Lumbar bone mineral density L1-L4 is 0.819g/cm2, which is a t-score of -2.1. The z-score is -0.9.    Total Hip: The total hip bone mineral density is 0.868g/cm2.  The t-score is -0.6, and the z-score is 0.1.  Femoral  neck BMD is 0.723g/cm2 and the t-score is -1.1.      COMPARISONS:  Date Location BMD T-score  05/24/12 L-spine 0.828 -2.0  Total Hip 0.923 -0.2   Impression      Low bone mass/osteopenia of the lumbar spine and femoral neck.  Decrease in hip BMD (-6%) since prior study. FRAX calculations do not suggest treatment.      Recommendations:  1) Adequate calcium and Vitamin D therapy  2) Appropriate exercise  3) Glucocorticoids may adversely affect quality and quantity of bone.    Consider bisphosphonate if patient requires prednisone 7.5 mg or greater for 3 months or more.  4) Consider repeat BMD in 1-2 years    EXPLANATION OF RESULTS:  The t-score compares this results to the bone density of a 25 year old of the same gender. The z-score compares this result to the average bone density to people of the same age and gender. The amounts indicate the number of standard deviations above or below the mean.  * Osteoporosis is generally defined as having a t-score between less than -2.5.  * Osteopenia is generally defined as having a t-score between -1 and -2.5.  * The normal range is generally defined as having a t-score between -1 to 1.      Electronically signed by: VARUN LAURA MD  Date: 02/21/17        Results for TONNY GOMEZ (MRN [id number]) as of 5/21/2018 11:31   Ref. Range 8/11/2017 09:50   Vit D, 25-Hydroxy Latest Ref Range: 30 - 96 ng/mL 45            Current Assessment & Plan     Last denosumab 60mg sc 3/27/19 and q 6 months         Left hip pain    Current Assessment & Plan     Cont PT  Ref to Ortho, Dr. Jang she wants to see physician           Other Visit Diagnoses     Pain of left hip joint    -  Primary    Relevant Orders    Ambulatory Referral to Orthopedics    Rheumatoid arthritis, involving unspecified site, unspecified rheumatoid factor presence        Relevant Medications    leflunomide (ARAVA) 20 MG Tab

## 2019-04-11 NOTE — PROGRESS NOTES
CC: left hip pain    HPI:   Joyce Peterson is a pleasant 58 y.o. patient who reports to clinic with left hip pain. No trauma, [facility]    She reports that her hip pain started in the middle of January 2019 located of the inner thigh and groin area without any injury or trauma. Pain continued to worsen gradually and moved to the lateral hip area until she was seen by Joyce May on 2/4/19 and determined to have a stress reaction of the surgical neck of the left femur. She was placed on crutches for 4-6 weeks with TTWB with improvement of her hip pain. This pain was located of the lateral hip area and worse with weightbearing and also walking. She now has no pain with weightbearing but is concerned because she still have some hip pain in different areas.     She was previously having intermittent hip pain located of the deep inner groin, anterior and lateral hip areas that is mild to moderate at times. No pain still idle with full weightbearing.     She has been doing PT with continued hip pain improvement but she is concerned about her inner groin pain that is still occurring with her upcoming trip in 2 weeks.     The pain is groin is what bothers her most.     Today the patient rates pain at a 4-6/10 on visual analog scale.      Affecting ADLs and exercising    Walking and after excesssive activity makes pain worse. Rest improves pain.    She has a trip coming up on Aprill 22nd that she wants to be improved for.     She has a PMH of Rheumatoid Arthritis see by Dr. Moran. PMH of 9 stress fractures in the past to various areas.     Medical history notable for osteopenia (on Prolia and citracal+D, last dexa 2/28/19 T-score -2.1 lumbar vertebra, -1.1 left femoral neck) and RA (on Kevzara and prednisone 5 mg)    Review of Systems   Constitution: Negative. Negative for chills, fever and night sweats.   HENT: Negative for congestion and headaches.    Eyes: Negative for blurred vision, left vision loss  and right vision loss.   Cardiovascular: Negative for chest pain and syncope.   Respiratory: Negative for cough and shortness of breath.    Endocrine: Negative for polydipsia, polyphagia and polyuria.   Hematologic/Lymphatic: Negative for bleeding problem. Does not bruise/bleed easily.   Skin: Negative for dry skin, itching and rash.   Musculoskeletal: Negative for falls and muscle weakness.   Gastrointestinal: Negative for abdominal pain and bowel incontinence.   Genitourinary: Negative for bladder incontinence and nocturia.   Neurological: Negative for disturbances in coordination, loss of balance and seizures.   Psychiatric/Behavioral: Negative for depression. The patient does not have insomnia.    Allergic/Immunologic: Negative for hives and persistent infections.   All other systems negative.    PAST MEDICAL HISTORY:   Past Medical History:   Diagnosis Date    Acid reflux     Allergy     Anemia     Asthma     Coronary artery disease     Degenerative disc disease     Dry eyes     Dry mouth     Gastroparesis     Hyperlipidemia     Lateral meniscus derangement 4/6/2016    Lobular carcinoma in situ     Osteoarthritis     Osteoporosis     Rheumatoid arthritis(714.0)     Umbilical hernia 8/13/2015     PAST SURGICAL HISTORY:   Past Surgical History:   Procedure Laterality Date    ARTHROSCOPY-KNEE Right 4/6/2016    [facility] OR 2ND FLR    BREAST BIOPSY Left 01/29/2002    core bx    CARPAL TUNNEL RELEASE Right 05/2017    CHOLECYSTECTOMY  2004    COLONOSCOPY      10/11    COLONOSCOPY N/A 6/29/2017    Performed by López Moore MD at [facility] ENDO (4TH FLR)    ESOPHAGOGASTRODUODENOSCOPY (EGD) N/A 6/29/2017    Performed by L[facility] at [facility] ENDO (4TH FLR)    RELEASE-CARPAL TUNNEL / Right Right 5/29/2017    [facility]    REPAIR, TENDON, TRICEPS left elbow Left 10/17/2018    Performed [facility]rough [facility] at [facility] OR  1ST FLR    REPAIR-HERNIA-UMBILICAL N/A 8/13/2015    Performed [facility] at [facility] OR 2ND FLR    TONSILLECTOMY      TUBAL LIGATION  2003    UPPER GASTROINTESTINAL ENDOSCOPY      10/11    uterine ablation  2003     FAMILY HISTORY:   Family History   Problem Relation Age of Onset    Cancer Mother         Lung Cancer    Emphysema Mother     Heart attack Mother     Cancer Father         Lung Cancer    Osteoarthritis Father     Lung cancer Father     Skin cancer Father     Heart disease Brother     Heart attack Brother     Osteoarthritis Paternal Aunt     Retinal detachment Maternal Aunt     No Known Problems Sister     No Known Problems Maternal Uncle     No Known Problems Paternal Uncle     No Known Problems Maternal Grandmother     No Known Problems Maternal Grandfather     No Known Problems Paternal Grandmother     No Known Problems Paternal Grandfather     Colon cancer Neg Hx     Esophageal cancer Neg Hx     Stomach cancer Neg Hx     Celiac disease Neg Hx     Diabetes Neg Hx     Thyroid disease Neg Hx     Amblyopia Neg Hx     Blindness Neg Hx     Cataracts Neg Hx     Glaucoma Neg Hx     Hypertension Neg Hx     Macular degeneration Neg Hx     Strabismus Neg Hx     Stroke Neg Hx      SOCIAL HISTORY:   Social History     Socioeconomic History    Marital status:      Spouse name: Not on file    Number of children: Not on file    Years of education: Not on file    Highest education level: Not on file   Occupational History    Not on file   Social Needs    Financial resource strain: Not on file    Food insecurity:     Worry: Not on file     Inability: Not on file    Transportation needs:     Medical: Not on file     Non-medical: Not on file   Tobacco Use    Smoking status: Never Smoker    Smokeless tobacco: Never Used   Substance and Sexual Activity    Alcohol use: Yes     Comment: occasionally    Drug use: No    Sexual activity: Yes     Partners: Male      Birth control/protection: Surgical   Lifestyle    Physical activity:     Days per week: Not on file     Minutes per session: Not on file    Stress: Not on file   Relationships    Social connections:     Talks on phone: Not on file     Gets together: Not on file     Attends Sikh service: Not on file     Active member of club or organization: Not on file     Attends meetings of clubs or organizations: Not on file     Relationship status: Not on file   Other Topics Concern    Are you pregnant or think you may be? Not Asked    Breast-feeding Not Asked   Social History Narrative    Not on file       MEDICATIONS:   Current Outpatient Medications:     aspirin 81 mg Tab, Take 81 mg by mouth every morning. , Disp: , Rfl:     azelastine (ASTELIN) 137 mcg nasal spray, 1 spray (137 mcg total) by Nasal route 2 (two) times daily. (Patient taking differently: 1 spray by Nasal route 2 (two) times daily as needed. ), Disp: 30 mL, Rfl: 11    azithromycin (Z-DEVON) 250 MG tablet, Take 2 tablets by mouth on day 1; Take 1 tablet by mouth on days 2-5, Disp: 6 tablet, Rfl: 0    budesonide-formoterol 160-4.5 mcg (SYMBICORT) 160-4.5 mcg/actuation HFAA, Inhale 2 puffs into the lungs every 12 (twelve) hours., Disp: 3 Inhaler, Rfl: 3    calcium citrate-vitamin D (CITRACAL + D) 315-200 mg-unit per tablet, Take 1 tablet by mouth 2 (two) times daily. , Disp: , Rfl:     ciprofloxacin HCl (CIPRO) 500 MG tablet, Take 1 tablet by mouth twice daily., Disp: 14 tablet, Rfl: 2    diazePAM (VALIUM) 5 MG tablet, Take once for MRI, Disp: 1 tablet, Rfl: 0    diclofenac sodium (VOLTAREN) 1 % Gel, Apply 2 g topically 4 (four) times daily as needed., Disp: 500 g, Rfl: 5    docusate sodium (COLACE) 100 MG capsule, Take 1 capsule (100 mg total) by mouth 3 (three) times daily., Disp: 90 capsule, Rfl: 0    fish oil-omega-3 fatty acids 300-1,000 mg capsule, Take 1,400 g by mouth once daily., Disp: , Rfl:     flaxseed oil 1,000 mg Cap, Take by  mouth once daily., Disp: , Rfl:     flu vac [id number] 36mos up,PF, 60 mcg (15 mcg x 4)/0.5 mL Syrg, To be administered by Pharm D, Disp: 0.5 mL, Rfl: 0    fluconazole (DIFLUCAN) 100 MG tablet, Take 100 mg by mouth once daily., Disp: , Rfl: 3    fluticasone (FLONASE) 50 mcg/actuation nasal spray, , Disp: , Rfl:     GUAIFENESIN (MUCINEX ORAL), Take 400 mg by mouth every 4 (four) hours as needed. , Disp: , Rfl:     INV PROPULSID 10 MG, Take 20 mg by mouth 4 (four) times daily. FOR INVESTIGATIONAL USE ONLY. Protocol CIS-USA-154, Disp: 1440 each, Rfl: 0    leflunomide (ARAVA) 20 MG Tab, Take 1 tablet (20 mg total) by mouth once daily., Disp: 30 tablet, Rfl: 1    montelukast (SINGULAIR) 10 mg tablet, Take 1 tablet (10 mg total) by mouth nightly., Disp: 90 tablet, Rfl: 3    naproxen (NAPROSYN) 500 MG tablet, TAKE 1 TABLET TWICE A DAY AS NEEDED, Disp: 180 tablet, Rfl: 1    neomycin-polymyxin-dexamethasone (DEXACINE) 3.5 mg/g-10,000 unit/g-0.1 % Oint, Apply to affected external eyelid tissue one or two times per day for ten days, Disp: 3.5 g, Rfl: 0    omeprazole (PRILOSEC) 40 MG capsule, TAKE ONE CAPSULE BY MOUTH EVERY MORNING, Disp: 30 capsule, Rfl: 6    omeprazole-sodium bicarbonate (ZEGERID) 40-1.1 mg-gram per capsule, TAKE 1 CAPSULE BY MOUTH EVERY MORNING., Disp: 90 capsule, Rfl: 3    phenazopyridine (PYRIDIUM) 200 MG tablet, Take 1 tablet by mouth four times daily as needed for urinary pain., Disp: 16 tablet, Rfl: 2    polyethylene glycol (GLYCOLAX) 17 gram/dose powder, Mix 1 capful (17 grams total) with liquid and take by mouth once daily., Disp: 1581 g, Rfl: 0    POTASSIUM ORAL, Take by mouth once daily., Disp: , Rfl:     predniSONE (DELTASONE) 1 MG tablet, Take 1 tablet (1 mg total) by mouth once daily., Disp: 90 tablet, Rfl: 1    predniSONE (DELTASONE) 5 MG tablet, Take 1 tablet (5 mg total) by mouth once daily., Disp: 90 tablet, Rfl: 1    PREMARIN 0.625 mg tablet, Take 0.625 mg by mouth once  "daily., Disp: , Rfl: 4    progesterone (PROMETRIUM) 100 MG capsule, Take 100 mg by mouth every morning. 1 Capsule Oral Every day, morning, Disp: , Rfl:     PROVENTIL HFA 90 mcg/actuation inhaler, INHALE 2 PUFFS EVERY 6 HOURS AS NEEDED, Disp: 20.1 Inhaler, Rfl: 0    RESTASIS 0.05 % ophthalmic emulsion, PLACE 0.4 MLS (1 DROP TOTAL) INTO BOTH EYES 2 (TWO) TIMES DAILY., Disp: , Rfl: 5    rizatriptan (MAXALT) 10 MG tablet, Take 10 mg by mouth as needed for Migraine. , Disp: , Rfl:     rosuvastatin (CRESTOR) 20 MG tablet, TAKE 1 TABLET EVERY DAY (Patient taking differently: TAKE 1 TABLET EVERY evening), Disp: 90 tablet, Rfl: 3    sarilumab (KEVZARA) 200 mg/1.14 mL PnIj, Inject 1.14 mLs (200 mg total) into the skin every 14 (fourteen) days., Disp: 2 pen, Rfl: 2    sarilumab (KEVZARA) 200 mg/1.14 mL Syrg, Inject 1.14 mLs (200 mg total) into the skin every 14 (fourteen) days., Disp: 2.28 mL, Rfl: 2    sucralfate (CARAFATE) 1 gram tablet, TAKE 1 TABLET (1 G TOTAL) BY MOUTH 4 (FOUR) TIMES DAILY., Disp: 360 tablet, Rfl: 3    syringe with needle (TUBERCULIN SYRINGE) 1 mL 27 x 1/2" Syrg, To administer methotrexate 7.5mg sc once a week, Disp: 12 Syringe, Rfl: 3    traMADol (ULTRAM) 50 mg tablet, Take 1 tablet (50 mg total) by mouth every 6 (six) hours., Disp: 28 tablet, Rfl: 0    triamcinolone acetonide 0.1% (KENALOG) 0.1 % cream, AAA qhs to knuckles then vaseline prn flare; not more than 2 weeks straight in same location, avoid use on face and groin, Disp: 45 g, Rfl: 1    trospium (SANCTURA) 20 mg Tab tablet, Take 1 tablet (20 mg total) by mouth 2 (two) times daily., Disp: 60 tablet, Rfl: 11    valACYclovir (VALTREX) 1000 MG tablet, TAKE 1 TABLET BY MOUTH TWICE A DAY AS NEEDED, Disp: 20 tablet, Rfl: 3    varicella-zoster [id number], PF, (SHINGRIX, PF,) 50 mcg/0.5 mL injection, Inject into the muscle., Disp: 0.5 mL, Rfl: 0  No current facility-administered medications for this visit.     Facility-Administered " "Medications Ordered in Other Visits:     0.9%  NaCl infusion, , Intravenous, Continuous, Callum Singer MD, Stopped at 10/17/18 0417  ALLERGIES:   Review of patient's allergies indicates:   Allergen Reactions    Actemra [tocilizumab] Other (See Comments)     Severe dizziness    Codeine Nausea And Vomiting    Gold au 198 Hives and Rash    Hydroxychloroquine Other (See Comments)     Can't remember the reaction      Iodinated contrast- oral and iv dye Other (See Comments)     Other reaction(s): BURNING ALL OVER    Iodine Other (See Comments)     Other reaction(s): BURNING ALL OVER - Iodine dye - Not topical    Sulfa (sulfonamide antibiotics) Other (See Comments)     Can't remember the reaction    Zofran [ondansetron hcl (pf)] Nausea And Vomiting     Pt reports that last time she received zofran she started vomiting again    Methotrexate analogues Nausea Only    Pneumovax 23 [pneumococcal 23-argenis ps vaccine] Other (See Comments)     "sick"       VITAL SIGNS: BP (!) 145/78   Pulse 96   Ht 5' 1" (1.549 m)   Wt 71.7 kg (158 lb)   LMP  (LMP Unknown)   BMI 29.85 kg/m²        PHYSICAL EXAM /  HIP  PHYSICAL EXAMINATION  General:  The patient is alert and oriented x 3.  Mood is pleasant.  Observation of ears, eyes and nose reveal no gross abnormalities.  HEENT: NCAT, sclera nonicteric  Lungs: Respirations are equal and unlabored..    left HIP EXAMINATION     OBSERVATION / INSPECTION  Gait:   Nonantalgic   Alignment:  Neutral   Scars:   None   Muscle atrophy: None   Effusion:  None   Warmth:  None   Discoloration:   None   Leg lengths:   Equal   Pelvis:   Level     TENDERNESS / CREPITUS (T/C):      T / C  Trochanteric bursa   Mild + / -  Piriformis    - / -  SI joint    - / -  Psoas tendon   - / -  Rectus insertion  - / -  Adductor origin  + / -  Pubic symphysis  - / -  IT band                                   + / -  Gluteus tendons                     - / -    ROM: (* = pain)    Flexion:    120 " degrees  External rotation: 40 degrees  Internal rotation with axial load: 25 degrees  Internal rotation without axial load: 35 degrees  Abduction:  40 degrees*  Adduction:   20 degrees    SPECIAL TESTS:  Pain w/ forced internal rotation (FADIR): -   Pain w/ forced external rotation (SANDRA): Absent   Circumduction test:    -  Stinchfield test:    Negative   Log roll:      Negative   Snapping hip (internal):   Negative   Step-down test:    +  Trendelenburg test:    Negative  Bridge test     +     EXTREMITY NEURO-VASCULAR EXAMINATION:   Sensation:  Grossly intact to light touch all dermatomal regions.   Motor Function:  Fully intact motor function at hip, knee, foot and ankle    DTRs;  quadriceps and  achilles 2+.  No clonus and downgoing Babinski.    Vascular status:  DP and PT pulses 2+, brisk capillary refill, symmetric.    Skin:  intact, compartments soft.    OTHER FINDINGS:    MRI left hip from 1/28/19 interpreted by Dr. Duff and team:  Previous MRI pelvis reviewed. T1 shows small area of bony edema at compression side of left femoral neck, not involving the cortex. This does not correlate with a fracture line on T2. Therefore would consider this a stress reaction which could be related to adductor strain.    CT left hip from 1/31/19:  The visualized intraabdominal content is unremarkable.  There is degenerative change visualized within the lower lumbar spine including vacuum disc phenomena at L5-S1.  The hip joint space is satisfactorily preserved.  There is osseous spur at the greater trochanter.  There is no fracture, dislocation, or bony erosion.  Specifically there is no fracture along the medial base of the left femoral neck within area of abnormal edematous signal seen on MRI dated 01/28/2019.    XRAYS:  2 hip/pelvis views were independently reviewed and interpreted by myself.  No fracture, subluxation. Mild left hip DJD noted.    ASSESSMENT:    1. left hip pain, acute  2. Left hip adductor  pain/tendinitis  3. Left hip flexor pain  4. Trochanteric bursitis, left   5. IT band pain, left  S/p stress reaction of left femoral neck  hip abd/core weakness    Appears that her femur stress reaction pain has resolved and now she is having multiple areas of hip pain and tendinitis from core, hip, pelvis muscle weakness. Still no signs of return of stress reaction pain.    PLAN:  1. Continue PT for left hip abd/core strengthening.  PT for extra-articular hip pain from adductor tendinitis, hip flexor tendinitis, psoas tightness, It band pain, trochanteric bursitis. PT for hip, core, abd, pelvic muscle stretching and strengthening x12+ weeks with HEP.  Encouraged her to keep going with this as it will take time and she has noticed some improvement.     2. OTC pain control as needed. Try ice or heat compresses    3. Will place referral for left adductor tendon lidocaine and steroid injection. Keep pain log after.     4. RTC in 4 weeks or after trip but come back immediately should she again have pain with isolated loading/weight bearing (standing).     Call me back if she cannot get an injection appointment prior to her trip. Cautioned her to rest frequently even if feeling better on her trip.     All questions were answered, pt will contact us for questions or concerns in the interim.

## 2019-04-12 ENCOUNTER — PATIENT MESSAGE (OUTPATIENT)
Dept: RHEUMATOLOGY | Facility: CLINIC | Age: 59
End: 2019-04-12

## 2019-04-12 ENCOUNTER — TELEPHONE (OUTPATIENT)
Dept: PHARMACY | Facility: CLINIC | Age: 59
End: 2019-04-12

## 2019-04-12 ENCOUNTER — TELEPHONE (OUTPATIENT)
Dept: RHEUMATOLOGY | Facility: CLINIC | Age: 59
End: 2019-04-12

## 2019-04-12 ENCOUNTER — TELEPHONE (OUTPATIENT)
Dept: ORTHOPEDICS | Facility: CLINIC | Age: 59
End: 2019-04-12

## 2019-04-12 DIAGNOSIS — M84.9 DISORDER OF CONTINUITY OF BONE: ICD-10-CM

## 2019-04-12 DIAGNOSIS — M25.552 PAIN OF BOTH HIP JOINTS: Primary | ICD-10-CM

## 2019-04-12 DIAGNOSIS — Z79.899 ENCOUNTER FOR MONITORING DENOSUMAB THERAPY: ICD-10-CM

## 2019-04-12 DIAGNOSIS — M25.551 PAIN OF BOTH HIP JOINTS: Primary | ICD-10-CM

## 2019-04-12 DIAGNOSIS — Z51.81 ENCOUNTER FOR MONITORING DENOSUMAB THERAPY: ICD-10-CM

## 2019-04-12 NOTE — TELEPHONE ENCOUNTER
[facility] called pt on 4/12/19 to refill Kevzara.  Pt stated that she has heartburn and acid reflux during the week after her Kevzara injection.  She says that heartburn usually clears by the time she is ready to inject again 2 weeks later.  Pt states that she knows it will return after her next injection.  Pt would like to continue on the Kevzara because it works.  She also has a GI appointment on 4/17.  We will follow up with her after GI appointment.  We will also message [facility] to make them aware of the issue.

## 2019-04-12 NOTE — TELEPHONE ENCOUNTER
"----- Message from Eloina Panda MA sent at 4/12/2019  1:10 PM CDT -----  I think she just needs her f/u appt with [facility].    ----- Message -----  From: Joe Garcia III, MD  Sent: 4/12/2019  11:07 AM  To: Eloina Panda MA, Odalis Rock MA    This is an established [facility] patient. He saw her in march. I agree with his plan. If she is still having issues I would think that she would be best served by seeing him again. She does not have arthritis and does not need a hip replacement, if anything it would be percutaneous screw fixation which is exactly what Dr Duff does.     Of course, if she doesn't want to go back to [facility] I would be happy to see her.     Thank you    Here is his plan:  "MRI consistent with stress reaction on compression side of left femoral neck without fracture. No indication for perc screw fixation at this time, but discussed with patient that with additional trauma this could become a femoral neck fracture. She has some continued pain with movement in the area of the adductors, but no pain with load bearing alone (resoled since initial presentation) suggesting the bony issue is healing well.  Will put in physical therapy referral for stretching and strengthening of left hip adductors/abductors and core. Advance weight bearing as tolerated with walker. Continue to use walker for at least next three weeks for protection from falls. Pt has trip to [place] coming up in 6 weeks. She will follow-up with us in 6 weeks prior to the trip. If symptoms worsen or do not improve in 3 weeks she will call to be seen sooner."    ----- Message -----  From: Odalis Rock MA  Sent: 4/12/2019  10:18 AM  To: Joe Garcia III, MD     Will you see this pt?  ----- Message -----  From: Eloina Panda MA  Sent: 4/12/2019  10:14 AM  To: Odalis Rock MA, Joe Garcia III, MD        ----- Message -----  From: Farshad Jang MD  Sent: 4/12/2019  10:04 AM  To: Eloina Panda, " CHARLOTTE Garcia  ----- Message -----  From: Eloina Panda MA  Sent: 4/12/2019   9:20 AM  To: Farshad Cortes MD    Will you see this pt? Dr. Arriaga says there may be a stress fx in her hip.    ----- Message -----  From: Tim Macias MA  Sent: 4/12/2019   8:54 AM  To: Laine Damon    Good morning, dr arriaga  Would like dr cortes to see this patient, for hip joint pain,  He is doing a bone scan and xrays. She has seen a PA. But he would like dr cortes to see her. Thanks tim

## 2019-04-13 DIAGNOSIS — I25.10 CORONARY ARTERY DISEASE INVOLVING NATIVE CORONARY ARTERY OF NATIVE HEART WITHOUT ANGINA PECTORIS: Chronic | ICD-10-CM

## 2019-04-13 RX ORDER — ROSUVASTATIN CALCIUM 20 MG/1
20 TABLET, COATED ORAL NIGHTLY
Qty: 90 TABLET | Refills: 3 | Status: SHIPPED | OUTPATIENT
Start: 2019-04-13 | End: 2020-08-03 | Stop reason: SDUPTHER

## 2019-04-15 ENCOUNTER — TELEPHONE (OUTPATIENT)
Dept: ORTHOPEDICS | Facility: CLINIC | Age: 59
End: 2019-04-15

## 2019-04-15 ENCOUNTER — PATIENT MESSAGE (OUTPATIENT)
Dept: RHEUMATOLOGY | Facility: CLINIC | Age: 59
End: 2019-04-15

## 2019-04-15 NOTE — TELEPHONE ENCOUNTER
Spoke with pt.   Scheduled appointment with Dr Garcia 4/16 at 130 pm.  Pt verbalized understanding

## 2019-04-16 ENCOUNTER — OFFICE VISIT (OUTPATIENT)
Dept: ORTHOPEDICS | Facility: CLINIC | Age: 59
End: 2019-04-16
Payer: MEDICARE

## 2019-04-16 ENCOUNTER — HOSPITAL ENCOUNTER (OUTPATIENT)
Dept: RADIOLOGY | Facility: HOSPITAL | Age: 59
Discharge: HOME | End: 2019-04-16
Attending: ORTHOPAEDIC SURGERY
Payer: MEDICARE

## 2019-04-16 VITALS — WEIGHT: 156.88 LBS | HEIGHT: 61 IN | BODY MASS INDEX: 29.62 KG/M2

## 2019-04-16 DIAGNOSIS — M25.552 LEFT HIP PAIN: ICD-10-CM

## 2019-04-16 DIAGNOSIS — M25.552 LEFT HIP PAIN: Primary | ICD-10-CM

## 2019-04-16 DIAGNOSIS — M70.62 GREATER TROCHANTERIC BURSITIS OF LEFT HIP: ICD-10-CM

## 2019-04-16 PROCEDURE — 99214 OFFICE O/P EST MOD 30 MIN: CPT | Mod: 25,S$GLB,, | Performed by: ORTHOPAEDIC SURGERY

## 2019-04-16 PROCEDURE — 3008F PR BODY MASS INDEX (BMI) DOCUMENTED: ICD-10-PCS | Mod: CPTII,S$GLB,, | Performed by: ORTHOPAEDIC SURGERY

## 2019-04-16 PROCEDURE — 99999 PR PBB SHADOW E&M-EST. PATIENT-LVL IV: ICD-10-PCS | Mod: PBBFAC,,, | Performed by: ORTHOPAEDIC SURGERY

## 2019-04-16 PROCEDURE — 99999 PR PBB SHADOW E&M-EST. PATIENT-LVL IV: CPT | Mod: PBBFAC,,, | Performed by: ORTHOPAEDIC SURGERY

## 2019-04-16 PROCEDURE — 20610 LARGE JOINT ASPIRATION/INJECTION: L GREATER TROCHANTERIC BURSA: ICD-10-PCS | Mod: LT,S$GLB,, | Performed by: ORTHOPAEDIC SURGERY

## 2019-04-16 PROCEDURE — 3008F BODY MASS INDEX DOCD: CPT | Mod: CPTII,S$GLB,, | Performed by: ORTHOPAEDIC SURGERY

## 2019-04-16 PROCEDURE — 20610 DRAIN/INJ JOINT/BURSA W/O US: CPT | Mod: LT,S$GLB,, | Performed by: ORTHOPAEDIC SURGERY

## 2019-04-16 PROCEDURE — 99214 PR OFFICE/OUTPT VISIT, EST, LEVL IV, 30-39 MIN: ICD-10-PCS | Mod: 25,S$GLB,, | Performed by: ORTHOPAEDIC SURGERY

## 2019-04-16 RX ORDER — TRIAMCINOLONE ACETONIDE 40 MG/ML
40 INJECTION, SUSPENSION INTRA-ARTICULAR; INTRAMUSCULAR
Status: DISCONTINUED | OUTPATIENT
Start: 2019-04-16 | End: 2019-04-16 | Stop reason: HOSPADM

## 2019-04-16 RX ADMIN — TRIAMCINOLONE ACETONIDE 40 MG: 40 INJECTION, SUSPENSION INTRA-ARTICULAR; INTRAMUSCULAR at 02:04

## 2019-04-16 NOTE — LETTER
April 16, 2019      Isiah Moran MD  [address]  [facility] [address]           [facility]  [address]  [facility] [address]  Phone: [phone]          Patient: Joyce Peterson   MR Number: [id number]   YOB: 1960   Date of Visit: 4/16/2019       Dear Dr. Isiah Moran:    Thank you for referring Joyce Peterson to me for evaluation. Attached you will find relevant portions of my assessment and plan of care.    If you have questions, please do not hesitate to call me. I look forward to following Joyce Peterson along with you.    Sincerely,    Joe Garcia III, MD    Enclosure  CC:  No Recipients    If you would like to receive this communication electronically, please contact [email or web address] or [phone] to request more information on [employer] Link access.    For providers and/or their staff who would like to refer a patient to [facility], please contact us through our one-stop-shop provider referral line, [facility], at [phone].    If you feel you have received this communication in error or would no longer like to receive these types of communications, please e-mail [email or web address]

## 2019-04-16 NOTE — PROGRESS NOTES
Subjective:     HPI:   Joyce Peterson is a 58 y.o. female who presents for evaluation of left hip pain, she now says both hips are painful but the left is still most symptomatic    She has significant rheumatoid arthritis and has failed multiple medications    She says symptoms started in January.  No injuries or change in activity level. In issues in the left groin it then it radiated to the lateral hip where it is mostly stayed.  The medial groin discomfort got better with dry needling.  She has pain with walking for which she used a cane.  When she walks she says she has pain at the anterolateral hip. She has got some a similar discomfort in the right hip now.    She has seen several providers for this.  Ultimately she got an MRI and CT scan of her pelvis.  The MRI showed a possible small area of stress reaction in the left medial calcar.  Which she saw Dr. salinas in her in March he felt like the bone is healing and her symptoms were more muscular in nature recommended progressive weight-bearing and follow up an MRI of things did get better.  She has also seen a sports provider who also told her the same thing.    She has seen a physical therapist who told her that her left leg was short she has got shoe insert on the left leg now she is unsure if that helps or not.    She takes tramadol as needed she is on Naprosyn twice a day she has tried Voltaren gel and she is on prednisone and then denosumab and sarilumab from Rheumatology    She says she has had several the bursa injections in her hips over the years but none recently.  She did have an IM injection with cortisone recently.  She did physical therapy and she says that is helping a lot.  She is using a cane for if she 1st had symptoms she is not using it now.  She would have difficulty walking more than 2 blocks.  Limitations include walking stamina and shopping.    She lives at home with her .  She is on disability for rheumatoid arthritis.    Of  note they are going on a trip to [place] on Monday and she is worried about being able to be active    Coronary artery disease no mile, looks like a pathologic metatarsal fracture in the past as well.  DEXA scan positive for osteopenia.  Significant rheumatoid arthritis and documented fibromyalgia in her chart.       ROS:  The updated medical history is in the chart and has been reviewed. A review of systems is updated and there is no reported vision changes, ear/nose/mouth/throat complaints,  chest pain, shortness of breath, abdominal pain, urological complaints, fevers or chills, psychiatric complaints. Musculoskeletal and neurologcial symptoms are as documented. All other systems are negative.      Objective:   Exam:  There were no vitals filed for this visit.  Body mass index is 29.64 kg/m².    Physical examination included assessment of the patient's general appearance with particular attention to development, nutrition, body habitus, attention to grooming, and any evidence of distress.  Constitutional: The patient is a well-developed, well-nourished patient in no acute distress.   Cardiovascular: Vascular examination included warmth and capillary refill as well inspection for edema and assessment of pedal pulses. Pulses are palpable and regular.  Musculoskeletal: Gait was assessed as to whether it was steady, non-antalgic, and/or required the use of an assist device. The patient was also asked to walk independently and get onto the examination table.  Skin: The skin was examined for any obvious rashes or lesions in the extremity.  Neurologic: Sensation is intact to light touch in the extremity. The patient has good coordination without hyperreflexia and is alert and oriented to person, place and time and has normal mood and affect.     All of the above were examined and found to be within normal limits except for the following pertinent clinical findings:    She has a significant Trendelenburg gait when she  walks.  When she takes the shoe lift out does get better.  She is tender palpation over both greater trochanters both laterally and anteriorly at the superior aspect of the troch.  She has no groin pain with active straight leg raise she has no pain in the leg with axial loading.  Hip range of motion 0-120 flexion 30 abduction 20 abduction 50 external 20 internal rotation which recreates her lateral discomfort but no groin pain. She has some pain with resisted abduction of the hip. Single leg stance causes lateral discomfort      Imaging:  Indication:  Left hip pain  Exam Ordered: Radiographs include an anteroposterior pelvis, an anteroposterior and lateral view of the proximal femur including the hip joint.  Details of Examination: Today's exam shows no evidence of joint space narrowing, fracture or dislocation.  No other significant findings are noted.  Impression: Normal Exam, Left Hip      MRI of her pelvis from January shows a small area of cortical reaction and calcar.  More significantly there is some abductors tendinosis and I what I think looks like abductor tear      Assessment:     I think her symptoms seem to be more consistent with abductors tendinopathy and likely tear.  She may have had a small stress fracture but if she did it is asymptomatic today    Significant rheumatoid arthritis has failed multiple medications is on significant immune modulators  No concern for hip arthritis at this time     Plan:       At this point I feel the patient has a diagnosis of trochanteric bursitis.  We discussed all the treatment options including but not limited to, the use of non-steroidal anti-inflammatory drugs (NSAIDs), physical therapy visits for stretching/strengthening and modalities treatment, as well as a consistent stretching program at home.  We further discussed the role of corticosteroid injections into the trochanteric bursa.  I explained that this is a short-term solution, however it does often allow  for return to physical therapy and stretching and the ability to alleviate the acute pain.      She will continue her current anti-inflammatory medications. I recommend a cane in the right hand.  She can continue her physical therapy she said is helping significantly.  Will do a targeted injection in her left greater trochanter today and.    The injection provided immediate relief she was walking up and down the gloria with significant relief for her symptoms.  She says the lateral pain was gone but the and anterolateral pain was still slightly residual    We discussed that I do think she has an abductor tendon tear.  These can be difficult and chronic problems.  Given her rheumatologic medications I think an open procedure could be high risk.  If anything I would recommend that she discuss the possibility of an arthroscopic repair with Dr. Brianna Hinson.         Orders Placed This Encounter   Procedures    Large Joint Aspiration/Injection: L greater trochanteric bursa     This order was created via procedure documentation       Past Medical History:   Diagnosis Date    Acid reflux     Allergy     Anemia     Asthma     Coronary artery disease     Degenerative disc disease     Dry eyes     Dry mouth     Gastroparesis     Hyperlipidemia     Lateral meniscus derangement 4/6/2016    Lobular carcinoma in situ     Osteoarthritis     Osteoporosis     Rheumatoid arthritis(714.0)     Umbilical hernia 8/13/2015       Past Surgical History:   Procedure Laterality Date    ARTHROSCOPY-KNEE Right 4/6/2016    [facility] OR 2ND FLR    BREAST BIOPSY Left 01/29/2002    core bx    CARPAL TUNNEL RELEASE Right 05/2017    CHOLECYSTECTOMY  2004    COLONOSCOPY      10/11    COLONOSCOPY N/A 6/29/2017    Performed [facility] L[facility]pez Moore [facility] at [facility] ENDO (4TH FLR)    ESOPHAGOGASTRODUODENOSCOPY (EGD) N/A 6/29/2017    [facility] ENDO (4TH FLR)    RELEASE-CARPAL TUNNEL /  Right Right 5/29/2017    [facility]    REPAIR, TENDON, TRICEPS left elbow Left 10/17/2018    [facility] 1ST FLR    REPAIR-HERNIA-UMBILICAL N/A 8/13/2015    [facility] Ric[facility] at [facility] OR 2ND FLR    TONSILLECTOMY      TUBAL LIGATION  2003    UPPER GASTROINTESTINAL ENDOSCOPY      10/11    uterine ablation  2003       Family History   Problem Relation Age of Onset    Cancer Mother         Lung Cancer    Emphysema Mother     Heart attack Mother     Cancer Father         Lung Cancer    Osteoarthritis Father     Lung cancer Father     Skin cancer Father     Heart disease Brother     Heart attack Brother     Osteoarthritis Paternal Aunt     Retinal detachment Maternal Aunt     No Known Problems Sister     No Known Problems Maternal Uncle     No Known Problems Paternal Uncle     No Known Problems Maternal Grandmother     No Known Problems Maternal Grandfather     No Known Problems Paternal Grandmother     No Known Problems Paternal Grandfather     Colon cancer Neg Hx     Esophageal cancer Neg Hx     Stomach cancer Neg Hx     Celiac disease Neg Hx     Diabetes Neg Hx     Thyroid disease Neg Hx     Amblyopia Neg Hx     Blindness Neg Hx     Cataracts Neg Hx     Glaucoma Neg Hx     Hypertension Neg Hx     Macular degeneration Neg Hx     Strabismus Neg Hx     Stroke Neg Hx        Social History     Socioeconomic History    Marital status:      Spouse name: Not on file    Number of children: Not on file    Years of education: Not on file    Highest education level: Not on file   Occupational History    Not on file   Social Needs    Financial resource strain: Not on file    Food insecurity:     Worry: Not on file     Inability: Not on file    Transportation needs:     Medical: Not on file     Non-medical: Not on file   Tobacco Use    Smoking status: Never Smoker    Smokeless tobacco:  Never Used   Substance and Sexual Activity    Alcohol use: Yes     Comment: occasionally    Drug use: No    Sexual activity: Yes     Partners: Male     Birth control/protection: Surgical   Lifestyle    Physical activity:     Days per week: Not on file     Minutes per session: Not on file    Stress: Not on file   Relationships    Social connections:     Talks on phone: Not on file     Gets together: Not on file     Attends Nondenominational service: Not on file     Active member of club or organization: Not on file     Attends meetings of clubs or organizations: Not on file     Relationship status: Not on file   Other Topics Concern    Are you pregnant or think you may be? Not Asked    Breast-feeding Not Asked   Social History Narrative    Not on file

## 2019-04-16 NOTE — PROCEDURES
Large Joint Aspiration/Injection: L greater trochanteric bursa  Date/Time: 4/16/2019 2:16 PM  Performed by: Joe Garcia III, MD  Authorized by: Joe Garcia III, MD     Consent Done?:  Yes (Verbal)  Indications:  Pain  Timeout: Prior to procedure the correct patient, procedure, and site was verified      Location:  Hip  Site:  L greater trochanteric bursa  Prep: Patient was prepped and draped in usual sterile fashion    Needle size:  21 G  Approach:  Lateral  Medications:  40 mg triamcinolone acetonide 40 mg/mL  Patient tolerance:  Patient tolerated the procedure well with no immediate complications

## 2019-04-17 ENCOUNTER — HOSPITAL ENCOUNTER (OUTPATIENT)
Dept: CARDIOLOGY | Facility: CLINIC | Age: 59
Discharge: HOME | End: 2019-04-17
Payer: MEDICARE

## 2019-04-17 ENCOUNTER — RESEARCH ENCOUNTER (OUTPATIENT)
Dept: RESEARCH | Facility: HOSPITAL | Age: 59
End: 2019-04-17

## 2019-04-17 ENCOUNTER — OFFICE VISIT (OUTPATIENT)
Dept: GASTROENTEROLOGY | Facility: CLINIC | Age: 59
End: 2019-04-17
Payer: MEDICARE

## 2019-04-17 ENCOUNTER — HOSPITAL ENCOUNTER (OUTPATIENT)
Dept: RADIOLOGY | Facility: HOSPITAL | Age: 59
Discharge: HOME | End: 2019-04-17
Attending: INTERNAL MEDICINE
Payer: MEDICARE

## 2019-04-17 VITALS
HEART RATE: 101 BPM | BODY MASS INDEX: 29.51 KG/M2 | SYSTOLIC BLOOD PRESSURE: 107 MMHG | HEIGHT: 61 IN | WEIGHT: 156.31 LBS | DIASTOLIC BLOOD PRESSURE: 76 MMHG

## 2019-04-17 DIAGNOSIS — K31.84 GASTROPARESIS: ICD-10-CM

## 2019-04-17 DIAGNOSIS — Z79.899 ENCOUNTER FOR MONITORING DENOSUMAB THERAPY: ICD-10-CM

## 2019-04-17 DIAGNOSIS — K31.84 GASTROPARESIS: Primary | ICD-10-CM

## 2019-04-17 DIAGNOSIS — K21.9 GASTROESOPHAGEAL REFLUX DISEASE WITHOUT ESOPHAGITIS: ICD-10-CM

## 2019-04-17 DIAGNOSIS — Z51.81 ENCOUNTER FOR MONITORING DENOSUMAB THERAPY: ICD-10-CM

## 2019-04-17 DIAGNOSIS — M25.551 PAIN OF BOTH HIP JOINTS: ICD-10-CM

## 2019-04-17 DIAGNOSIS — M84.9 DISORDER OF CONTINUITY OF BONE: ICD-10-CM

## 2019-04-17 DIAGNOSIS — M25.552 PAIN OF BOTH HIP JOINTS: ICD-10-CM

## 2019-04-17 DIAGNOSIS — Z00.6 RESEARCH STUDY PATIENT: ICD-10-CM

## 2019-04-17 PROCEDURE — 3008F BODY MASS INDEX DOCD: CPT | Mod: CPTII,S$GLB,, | Performed by: INTERNAL MEDICINE

## 2019-04-17 PROCEDURE — 99999 PR PBB SHADOW E&M-EST. PATIENT-LVL III: CPT | Mod: PBBFAC,,, | Performed by: INTERNAL MEDICINE

## 2019-04-17 PROCEDURE — 99215 OFFICE O/P EST HI 40 MIN: CPT | Mod: S$GLB,,, | Performed by: INTERNAL MEDICINE

## 2019-04-17 PROCEDURE — 73552 X-RAY EXAM OF FEMUR 2/>: CPT | Mod: 26,50,, | Performed by: RADIOLOGY

## 2019-04-17 PROCEDURE — 3008F PR BODY MASS INDEX (BMI) DOCUMENTED: ICD-10-PCS | Mod: CPTII,S$GLB,, | Performed by: INTERNAL MEDICINE

## 2019-04-17 PROCEDURE — 93000 EKG 12-LEAD: ICD-10-PCS | Mod: S$GLB,,, | Performed by: INTERNAL MEDICINE

## 2019-04-17 PROCEDURE — 73552 XR FEMUR 2 VIEW BILATERAL: ICD-10-PCS | Mod: 26,50,, | Performed by: RADIOLOGY

## 2019-04-17 PROCEDURE — 99999 PR PBB SHADOW E&M-EST. PATIENT-LVL III: ICD-10-PCS | Mod: PBBFAC,,, | Performed by: INTERNAL MEDICINE

## 2019-04-17 PROCEDURE — 73552 X-RAY EXAM OF FEMUR 2/>: CPT | Mod: 50,TC

## 2019-04-17 PROCEDURE — 93000 ELECTROCARDIOGRAM COMPLETE: CPT | Mod: S$GLB,,, | Performed by: INTERNAL MEDICINE

## 2019-04-17 PROCEDURE — 99215 PR OFFICE/OUTPT VISIT, EST, LEVL V, 40-54 MIN: ICD-10-PCS | Mod: S$GLB,,, | Performed by: INTERNAL MEDICINE

## 2019-04-17 NOTE — PROGRESS NOTES
Subjective:       Patient ID: Joyce Peterson is a 58 y.o. female.    Chief Complaint: Follow-up    HPI  Review of Systems   Constitutional: Negative for activity change, appetite change, chills, diaphoresis, fatigue, fever and unexpected weight change.   HENT: Negative for congestion, ear pain, mouth sores, nosebleeds, postnasal drip, rhinorrhea, sinus pressure, sore throat, trouble swallowing and voice change.    Eyes: Negative for pain.   Respiratory: Negative for cough, shortness of breath and wheezing.    Cardiovascular: Negative for chest pain, palpitations and leg swelling.   Genitourinary: Negative for difficulty urinating, dysuria, flank pain, hematuria and menstrual problem.   Musculoskeletal: Positive for arthralgias. Negative for back pain, gait problem, joint swelling, myalgias and neck pain.   Skin: Negative for rash.   Neurological: Negative for dizziness, tremors, syncope, numbness and headaches.   Hematological: Negative for adenopathy. Does not bruise/bleed easily.   Psychiatric/Behavioral: Negative for agitation, behavioral problems, confusion, decreased concentration and dysphoric mood. The patient is not nervous/anxious.        Objective:      Physical Exam   Constitutional: She is oriented to person, place, and time. She appears well-developed and well-nourished. No distress.   HENT:   Head: Normocephalic and atraumatic.   Right Ear: External ear normal.   Left Ear: External ear normal.   Nose: Nose normal.   Mouth/Throat: Oropharynx is clear and moist. No oropharyngeal exudate.   Eyes: Pupils are equal, round, and reactive to light. Conjunctivae are normal. No scleral icterus.   Neck: Normal range of motion. Neck supple. No thyromegaly present.   Cardiovascular: Normal rate, regular rhythm, normal heart sounds and intact distal pulses. Exam reveals no gallop.   No murmur heard.  Pulmonary/Chest: Effort normal and breath sounds normal. She has no wheezes. She has no rales.   Abdominal: Soft.  Bowel sounds are normal. She exhibits no distension and no mass. There is no tenderness. There is no rebound and no guarding. No hernia.   Musculoskeletal: Normal range of motion. She exhibits no edema or tenderness.   Lymphadenopathy:     She has no cervical adenopathy.   Neurological: She is alert and oriented to person, place, and time. No cranial nerve deficit.   Skin: Skin is warm and dry. No rash noted.   Psychiatric: She has a normal mood and affect. Her behavior is normal. Judgment and thought content normal.       Assessment:       1. Gastroparesis    2. Gastroesophageal reflux disease without esophagitis    3. Research study patient        Plan:         HISTORY OF PRESENT ILLNESS:  This is a followup and required study per the   compassionate trial of cisapride for gastroparesis.    Ms. Peterson comes in and she has been recently started on a new medicine for   her RA that being Kevzara.  Since starting, she has noticed an increase in her   GI symptoms.  She is having more heartburn, which has manifest as typical   pyrosis.  Rolaids give her some relief, but her main complaint is that of   regurgitation at night.  She is waking up with the taste of partially digested   food in her throat and mouth.  She has had recurrent episodes of the Candida   esophagitis.  She has been on Diflucan at various times in the past.  She   understands about the conflict between Diflucan and Propulsid and is always   careful to stop Propulsid before taking the Diflucan and never overlap.    She is sleeping with her head of bed elevated.  Usually, she is very careful   about early dinner, although her evening meal is her larger meal of the day.    ASSESSMENT AND DECISION MAKIN.  Gastroparesis, very difficult situation.  Right now, we are on Propulsid.    This has provided her good relief.  She can definitely tell the difference when   she has to go off it because of being on Diflucan with the worsening of the   symptoms.   If the symptoms continue, we will have to look at a surgical   intervention like pyloromyotomy or even a surgical placement of a gastric   stimulator.  It seems like the symptoms are definitely worse now with the   addition of the new medicine Kevzara.  I have given her advice on dietary   changes as far as changing her larger meal to noon and just eating very light at   night and we will give this a little time to see if it allows an improvement.  2.  Gastroesophageal reflux.  I think the reflux in this case is strictly   because of the gastroparesis.  That is the main contributing factor.  Making   confusing in her case is the intermittent Candida esophagitis related to her   underlying immunodeficiency combined with steroids' use.  3.  Research study patient.  I will fill out the forms this evening to allow   continuation of the study.  4.  History of Candida esophagitis, again related to her immune status and the   steroids.  I will see how we can do anything different than what we are already   doing.      ANDRES/IN  dd: 04/17/2019 15:28:12 (CDT)  td: 04/18/2019 14:10:42 (CDT)  Doc ID   #[id number]  Job ID #[id number]    CC:

## 2019-05-01 NOTE — PROGRESS NOTES
LIMITED ACCESS PROTOCOL FOR THE USE OF ORAL CISAPRIDE IN THE TREATMENT OF REFRACTORY GASTROESOPHAGEAL REFLUX DISEASE AND OTHER GASTROINTESTINAL MOTILITY DISORDERS - OMKAR MOORE MD  IRB #: [id number]   -  OMKAR MOORE MD     SubjID [id number]     Ms. Joyce Peterson was seen in the clinic for her 6 month follow-up visit. She stated that she is still receiving therapeutic benefit from Cisapride. She denied any new questions regarding the study or her participation and verbalized her willingness to continue.    Her lab work & EKG were completed on 04/17/2019 and reviewed by Dr. Moore prior to the visit.     Dr. Moore performed a physical exam and reviewed her chart since the last study contact and determined she is eligible to continue Cisapride. Her visit paperwork and drug request form was sent to the appropriate study contact on 05/01/2019.    Study staff at this visit was Theresa Quezada [facility]

## 2019-05-03 ENCOUNTER — PATIENT MESSAGE (OUTPATIENT)
Dept: RHEUMATOLOGY | Facility: CLINIC | Age: 59
End: 2019-05-03

## 2019-05-07 ENCOUNTER — TELEPHONE (OUTPATIENT)
Dept: SPORTS MEDICINE | Facility: CLINIC | Age: 59
End: 2019-05-07

## 2019-05-07 ENCOUNTER — TELEPHONE (OUTPATIENT)
Dept: PHARMACY | Facility: CLINIC | Age: 59
End: 2019-05-07

## 2019-05-09 ENCOUNTER — TELEPHONE (OUTPATIENT)
Dept: SPORTS MEDICINE | Facility: CLINIC | Age: 59
End: 2019-05-09

## 2019-05-09 ENCOUNTER — PATIENT MESSAGE (OUTPATIENT)
Dept: SPORTS MEDICINE | Facility: CLINIC | Age: 59
End: 2019-05-09

## 2019-05-09 NOTE — TELEPHONE ENCOUNTER
Left another message about needing to cancel patients appointment on 5/13/19 with Dr Hinson. Also, sent a portal message to patient to call back.

## 2019-05-10 ENCOUNTER — TELEPHONE (OUTPATIENT)
Dept: SPORTS MEDICINE | Facility: CLINIC | Age: 59
End: 2019-05-10

## 2019-05-10 NOTE — TELEPHONE ENCOUNTER
Spoke to patient regarding her appointment with Dr Hinson. Patient stated she did not want to see Khurram again. Patient in a lot of pain and not able to walk good due to her hip. Patient stated she wanted to see Dr Hinson only at this point.

## 2019-05-12 ENCOUNTER — PATIENT MESSAGE (OUTPATIENT)
Dept: RHEUMATOLOGY | Facility: CLINIC | Age: 59
End: 2019-05-12

## 2019-05-13 ENCOUNTER — LAB VISIT (OUTPATIENT)
Dept: LAB | Facility: HOSPITAL | Age: 59
End: 2019-05-13
Attending: INTERNAL MEDICINE
Payer: MEDICARE

## 2019-05-13 ENCOUNTER — OFFICE VISIT (OUTPATIENT)
Dept: SPORTS MEDICINE | Facility: CLINIC | Age: 59
End: 2019-05-13
Payer: MEDICARE

## 2019-05-13 VITALS — BODY MASS INDEX: 29.45 KG/M2 | WEIGHT: 156 LBS | HEIGHT: 61 IN

## 2019-05-13 DIAGNOSIS — Z79.899 ENCOUNTER FOR MONITORING LEFLUNOMIDE THERAPY: ICD-10-CM

## 2019-05-13 DIAGNOSIS — Z51.81 ENCOUNTER FOR MONITORING LEFLUNOMIDE THERAPY: ICD-10-CM

## 2019-05-13 DIAGNOSIS — M06.9 RHEUMATOID ARTHRITIS, INVOLVING UNSPECIFIED SITE, UNSPECIFIED RHEUMATOID FACTOR PRESENCE: ICD-10-CM

## 2019-05-13 DIAGNOSIS — M25.552 LEFT HIP PAIN: ICD-10-CM

## 2019-05-13 DIAGNOSIS — M70.62 TROCHANTERIC BURSITIS OF LEFT HIP: ICD-10-CM

## 2019-05-13 DIAGNOSIS — M84.353D STRESS FRACTURE OF NECK OF FEMUR WITH ROUTINE HEALING, SUBSEQUENT ENCOUNTER: Primary | ICD-10-CM

## 2019-05-13 LAB
ALBUMIN SERPL BCP-MCNC: 3.8 G/DL (ref 3.5–5.2)
ALP SERPL-CCNC: 64 U/L (ref 55–135)
ALT SERPL W/O P-5'-P-CCNC: 18 U/L (ref 10–44)
ANION GAP SERPL CALC-SCNC: 9 MMOL/L (ref 8–16)
AST SERPL-CCNC: 17 U/L (ref 10–40)
BASOPHILS # BLD AUTO: 0.06 K/UL (ref 0–0.2)
BASOPHILS NFR BLD: 0.7 % (ref 0–1.9)
BILIRUB SERPL-MCNC: 0.3 MG/DL (ref 0.1–1)
BUN SERPL-MCNC: 17 MG/DL (ref 6–20)
CALCIUM SERPL-MCNC: 9.9 MG/DL (ref 8.7–10.5)
CHLORIDE SERPL-SCNC: 104 MMOL/L (ref 95–110)
CO2 SERPL-SCNC: 25 MMOL/L (ref 23–29)
CREAT SERPL-MCNC: 0.9 MG/DL (ref 0.5–1.4)
CRP SERPL-MCNC: 0.4 MG/L (ref 0–8.2)
DIFFERENTIAL METHOD: ABNORMAL
EOSINOPHIL # BLD AUTO: 0 K/UL (ref 0–0.5)
EOSINOPHIL NFR BLD: 0.2 % (ref 0–8)
ERYTHROCYTE [DISTWIDTH] IN BLOOD BY AUTOMATED COUNT: 15.2 % (ref 11.5–14.5)
ERYTHROCYTE [SEDIMENTATION RATE] IN BLOOD BY WESTERGREN METHOD: 3 MM/HR (ref 0–36)
EST. GFR  (AFRICAN AMERICAN): >60 ML/MIN/1.73 M^2
EST. GFR  (NON AFRICAN AMERICAN): >60 ML/MIN/1.73 M^2
GLUCOSE SERPL-MCNC: 101 MG/DL (ref 70–110)
HCT VFR BLD AUTO: 44.8 % (ref 37–48.5)
HGB BLD-MCNC: 14.1 G/DL (ref 12–16)
IMM GRANULOCYTES # BLD AUTO: 0.04 K/UL (ref 0–0.04)
IMM GRANULOCYTES NFR BLD AUTO: 0.5 % (ref 0–0.5)
LYMPHOCYTES # BLD AUTO: 1.3 K/UL (ref 1–4.8)
LYMPHOCYTES NFR BLD: 15 % (ref 18–48)
MCH RBC QN AUTO: 29 PG (ref 27–31)
MCHC RBC AUTO-ENTMCNC: 31.5 G/DL (ref 32–36)
MCV RBC AUTO: 92 FL (ref 82–98)
MONOCYTES # BLD AUTO: 0.9 K/UL (ref 0.3–1)
MONOCYTES NFR BLD: 10.2 % (ref 4–15)
NEUTROPHILS # BLD AUTO: 6.5 K/UL (ref 1.8–7.7)
NEUTROPHILS NFR BLD: 73.4 % (ref 38–73)
NRBC BLD-RTO: 0 /100 WBC
PLATELET # BLD AUTO: 251 K/UL (ref 150–350)
PMV BLD AUTO: 10.9 FL (ref 9.2–12.9)
POTASSIUM SERPL-SCNC: 4.6 MMOL/L (ref 3.5–5.1)
PROT SERPL-MCNC: 6.8 G/DL (ref 6–8.4)
RBC # BLD AUTO: 4.86 M/UL (ref 4–5.4)
SODIUM SERPL-SCNC: 138 MMOL/L (ref 136–145)
WBC # BLD AUTO: 8.82 K/UL (ref 3.9–12.7)

## 2019-05-13 PROCEDURE — 86140 C-REACTIVE PROTEIN: CPT

## 2019-05-13 PROCEDURE — 99214 PR OFFICE/OUTPT VISIT, EST, LEVL IV, 30-39 MIN: ICD-10-PCS | Mod: S$GLB,,, | Performed by: ORTHOPAEDIC SURGERY

## 2019-05-13 PROCEDURE — 3008F PR BODY MASS INDEX (BMI) DOCUMENTED: ICD-10-PCS | Mod: CPTII,S$GLB,, | Performed by: ORTHOPAEDIC SURGERY

## 2019-05-13 PROCEDURE — 36415 COLL VENOUS BLD VENIPUNCTURE: CPT | Mod: PO

## 2019-05-13 PROCEDURE — 3008F BODY MASS INDEX DOCD: CPT | Mod: CPTII,S$GLB,, | Performed by: ORTHOPAEDIC SURGERY

## 2019-05-13 PROCEDURE — 85025 COMPLETE CBC W/AUTO DIFF WBC: CPT

## 2019-05-13 PROCEDURE — 99999 PR PBB SHADOW E&M-EST. PATIENT-LVL IV: CPT | Mod: PBBFAC,,, | Performed by: ORTHOPAEDIC SURGERY

## 2019-05-13 PROCEDURE — 80053 COMPREHEN METABOLIC PANEL: CPT

## 2019-05-13 PROCEDURE — 85652 RBC SED RATE AUTOMATED: CPT

## 2019-05-13 PROCEDURE — 99999 PR PBB SHADOW E&M-EST. PATIENT-LVL IV: ICD-10-PCS | Mod: PBBFAC,,, | Performed by: ORTHOPAEDIC SURGERY

## 2019-05-13 PROCEDURE — 99214 OFFICE O/P EST MOD 30 MIN: CPT | Mod: S$GLB,,, | Performed by: ORTHOPAEDIC SURGERY

## 2019-05-13 NOTE — PROGRESS NOTES
CC: left hip pain    HPI:   Joyce Peterson is a pleasant 58 y.o. patient who reports to clinic with left hip pain. No trauma[facility]    Here today for follow-up of her left hip pain.  She has had hip pain since January 2019.  This started insidiously without trauma.  The pain is located in her groin.  It is worse with ambulation and movement.  She was seen by her rheumatologist who ordered x-rays which showed no abnormality or joint space narrowing. He she had an MRI which showed edema along the medial calcar with no definitive fracture line and no involvement of the cortex. There was also mild cartilage thinning with degenerative labral tear as well as edema around the abductor insertion without definite tear. She was treated non operatively with non weight-bearing for 6 weeks.  She has tried physical therapy and which has helped some but never fully relieved her pain. She was seen by Dr. Joe Garcia who gave her a left hip greater troch bursal injection. She states this provided about 40% relief but it has since worn off.  It and it never gave her any relief for her groin pain. She has been using a cane for ambulation since January.  She saw Khurram Julien in April.  At that time she was having more muscular pain in her groin.  She did physical therapy with dry needling which resolved the pain.      She has a PMH of Rheumatoid Arthritis see by Dr. Moran.  She is on chronic prednisone treatment currently on 5 mg daily   PMH of 9 stress fractures in the past to various areas.     Medical history notable for osteopenia (on Prolia (denosumab) and citracal+D, last dexa 2/28/19 T-score -2.1 lumbar vertebra, -1.1 left femoral neck) and RA (on Kevzara and prednisone 5 mg)    Review of Systems   Constitution: Negative. Negative for chills, fever and night sweats.   HENT: Negative for congestion and headaches.    Eyes: Negative for blurred vision, left vision loss and right vision loss.    Cardiovascular: Negative for chest pain and syncope.   Respiratory: Negative for cough and shortness of breath.    Endocrine: Negative for polydipsia, polyphagia and polyuria.   Hematologic/Lymphatic: Negative for bleeding problem. Does not bruise/bleed easily.   Skin: Negative for dry skin, itching and rash.   Musculoskeletal: Negative for falls and muscle weakness.   Gastrointestinal: Negative for abdominal pain and bowel incontinence.   Genitourinary: Negative for bladder incontinence and nocturia.   Neurological: Negative for disturbances in coordination, loss of balance and seizures.   Psychiatric/Behavioral: Negative for depression. The patient does not have insomnia.    Allergic/Immunologic: Negative for hives and persistent infections.   All other systems negative.    PAST MEDICAL HISTORY:   Past Medical History:   Diagnosis Date    Acid reflux     Allergy     Anemia     Asthma     Coronary artery disease     Degenerative disc disease     Dry eyes     Dry mouth     Gastroparesis     Hyperlipidemia     Lateral meniscus derangement 4/6/2016    Lobular carcinoma in situ     Osteoarthritis     Osteoporosis     Rheumatoid arthritis(714.0)     Umbilical hernia 8/13/2015     PAST SURGICAL HISTORY:   Past Surgical History:   Procedure Laterality Date    ARTHROSCOPY-KNEE Right 4/6/2016    [facility] 2ND FLR    BREAST BIOPSY Left 01/29/2002    core bx    CARPAL TUNNEL RELEASE Right 05/2017    CHOLECYSTECTOMY  2004    COLONOSCOPY      10/11    COLONOSCOPY N/A 6/29/2017    Performed by L[facility]pez Moore [facility] at [facility] ENDO (4TH FLR)    ESOPHAGOGASTRODUODENOSCOPY (EGD) N/A 6/29/2017    Per[facility] ENDO (4TH FLR)    RELEASE-CARPAL TUNNEL / Right Right 5/29/2017    [facility]    REPAIR, TENDON, TRICEPS left elbow Left 10/17/2018    [facility] OR 1ST FLR     REPAIR-HERNIA-UMBILICAL N/A 8/13/2015    [facility] F[facility]    TONSILLECTOMY      TUBAL LIGATION  2003    UPPER GASTROINTESTINAL ENDOSCOPY      10/11    uterine ablation  2003     FAMILY HISTORY:   Family History   Problem Relation Age of Onset    Cancer Mother         Lung Cancer    Emphysema Mother     Heart attack Mother     Cancer Father         Lung Cancer    Osteoarthritis Father     Lung cancer Father     Skin cancer Father     Heart disease Brother     Heart attack Brother     Osteoarthritis Paternal Aunt     Retinal detachment Maternal Aunt     No Known Problems Sister     No Known Problems Maternal Uncle     No Known Problems Paternal Uncle     No Known Problems Maternal Grandmother     No Known Problems Maternal Grandfather     No Known Problems Paternal Grandmother     No Known Problems Paternal Grandfather     Colon cancer Neg Hx     Esophageal cancer Neg Hx     Stomach cancer Neg Hx     Celiac disease Neg Hx     Diabetes Neg Hx     Thyroid disease Neg Hx     Amblyopia Neg Hx     Blindness Neg Hx     Cataracts Neg Hx     Glaucoma Neg Hx     Hypertension Neg Hx     Macular degeneration Neg Hx     Strabismus Neg Hx     Stroke Neg Hx      SOCIAL HISTORY:   Social History     Socioeconomic History    Marital status:      Spouse name: Not on file    Number of children: Not on file    Years of education: Not on file    Highest education level: Not on file   Occupational History    Not on file   Social Needs    Financial resource strain: Not on file    Food insecurity:     Worry: Not on file     Inability: Not on file    Transportation needs:     Medical: Not on file     Non-medical: Not on file   Tobacco Use    Smoking status: Never Smoker    Smokeless tobacco: Never Used   Substance and Sexual Activity    Alcohol use: Yes     Comment: occasionally    Drug use: No    Sexual activity: Yes     Partners: Male     Birth  control/protection: Surgical   Lifestyle    Physical activity:     Days per week: Not on file     Minutes per session: Not on file    Stress: Not on file   Relationships    Social connections:     Talks on phone: Not on file     Gets together: Not on file     Attends Mosque service: Not on file     Active member of club or organization: Not on file     Attends meetings of clubs or organizations: Not on file     Relationship status: Not on file   Other Topics Concern    Are you pregnant or think you may be? Not Asked    Breast-feeding Not Asked   Social History Narrative    Not on file       MEDICATIONS:   Current Outpatient Medications:     aspirin 81 mg Tab, Take 81 mg by mouth every morning. , Disp: , Rfl:     azelastine (ASTELIN) 137 mcg nasal spray, 1 spray (137 mcg total) by Nasal route 2 (two) times daily. (Patient taking differently: 1 spray by Nasal route 2 (two) times daily as needed. ), Disp: 30 mL, Rfl: 11    budesonide-formoterol 160-4.5 mcg (SYMBICORT) 160-4.5 mcg/actuation HFAA, Inhale 2 puffs into the lungs every 12 (twelve) hours., Disp: 3 Inhaler, Rfl: 3    calcium citrate-vitamin D (CITRACAL + D) 315-200 mg-unit per tablet, Take 1 tablet by mouth 2 (two) times daily. , Disp: , Rfl:     diclofenac sodium (VOLTAREN) 1 % Gel, Apply 2 g topically 4 (four) times daily as needed., Disp: 500 g, Rfl: 5    docusate sodium (COLACE) 100 MG capsule, Take 1 capsule (100 mg total) by mouth 3 (three) times daily., Disp: 90 capsule, Rfl: 0    flu vac [id number] 36mos up,PF, 60 mcg (15 mcg x 4)/0.5 mL Syrg, To be administered by Pharm D, Disp: 0.5 mL, Rfl: 0    fluconazole (DIFLUCAN) 100 MG tablet, Take 100 mg by mouth once daily., Disp: , Rfl: 3    fluticasone (FLONASE) 50 mcg/actuation nasal spray, , Disp: , Rfl:     GUAIFENESIN (MUCINEX ORAL), Take 400 mg by mouth every 4 (four) hours as needed. , Disp: , Rfl:     INV PROPULSID 10 MG, Take 20 mg by mouth 4 (four) times daily. FOR  INVESTIGATIONAL USE ONLY. Protocol CIS-USA-154, Disp: 1440 each, Rfl: 0    leflunomide (ARAVA) 20 MG Tab, Take 1 tablet (20 mg total) by mouth once daily., Disp: 30 tablet, Rfl: 1    montelukast (SINGULAIR) 10 mg tablet, Take 1 tablet (10 mg total) by mouth nightly., Disp: 90 tablet, Rfl: 3    naproxen (NAPROSYN) 500 MG tablet, TAKE 1 TABLET TWICE A DAY AS NEEDED, Disp: 180 tablet, Rfl: 1    neomycin-polymyxin-dexamethasone (DEXACINE) 3.5 mg/g-10,000 unit/g-0.1 % Oint, Apply to affected external eyelid tissue one or two times per day for ten days, Disp: 3.5 g, Rfl: 0    omeprazole (PRILOSEC) 40 MG capsule, TAKE ONE CAPSULE BY MOUTH EVERY MORNING, Disp: 30 capsule, Rfl: 6    omeprazole-sodium bicarbonate (ZEGERID) 40-1.1 mg-gram per capsule, TAKE 1 CAPSULE BY MOUTH EVERY MORNING., Disp: 90 capsule, Rfl: 3    phenazopyridine (PYRIDIUM) 200 MG tablet, Take 1 tablet by mouth four times daily as needed for urinary pain., Disp: 16 tablet, Rfl: 2    POTASSIUM ORAL, Take by mouth once daily., Disp: , Rfl:     predniSONE (DELTASONE) 1 MG tablet, Take 1 tablet (1 mg total) by mouth once daily., Disp: 90 tablet, Rfl: 1    predniSONE (DELTASONE) 5 MG tablet, Take 1 tablet (5 mg total) by mouth once daily., Disp: 90 tablet, Rfl: 1    PREMARIN 0.625 mg tablet, Take 0.625 mg by mouth once daily., Disp: , Rfl: 4    progesterone (PROMETRIUM) 100 MG capsule, Take 100 mg by mouth every morning. 1 Capsule Oral Every day, morning, Disp: , Rfl:     PROVENTIL HFA 90 mcg/actuation inhaler, INHALE 2 PUFFS EVERY 6 HOURS AS NEEDED, Disp: 20.1 Inhaler, Rfl: 0    RESTASIS 0.05 % ophthalmic emulsion, PLACE 0.4 MLS (1 DROP TOTAL) INTO BOTH EYES 2 (TWO) TIMES DAILY., Disp: , Rfl: 5    rizatriptan (MAXALT) 10 MG tablet, Take 10 mg by mouth as needed for Migraine. , Disp: , Rfl:     rosuvastatin (CRESTOR) 20 MG tablet, Take 1 tablet (20 mg total) by mouth every evening., Disp: 90 tablet, Rfl: 3    sarilumab (KEVZARA) 200 mg/1.14 mL  "Syrg, Inject 1.14 mLs (200 mg total) into the skin every 14 (fourteen) days., Disp: 2.28 mL, Rfl: 2    sucralfate (CARAFATE) 1 gram tablet, TAKE 1 TABLET (1 G TOTAL) BY MOUTH 4 (FOUR) TIMES DAILY., Disp: 360 tablet, Rfl: 3    syringe with needle (TUBERCULIN SYRINGE) 1 mL 27 x 1/2" Syrg, To administer methotrexate 7.5mg sc once a week, Disp: 12 Syringe, Rfl: 3    traMADol (ULTRAM) 50 mg tablet, Take 1 tablet (50 mg total) by mouth every 24 hours as needed for Pain., Disp: 30 tablet, Rfl: 0    valACYclovir (VALTREX) 1000 MG tablet, TAKE 1 TABLET BY MOUTH TWICE A DAY AS NEEDED, Disp: 20 tablet, Rfl: 3    varicella-zoster [id number], PF, (SHINGRIX, PF,) 50 mcg/0.5 mL injection, Inject into the muscle., Disp: 0.5 mL, Rfl: 0  No current facility-administered medications for this visit.     Facility-Administered Medications Ordered in Other Visits:     0.9%  NaCl infusion, , Intravenous, Continuous, Callum Singer MD, Stopped at 10/17/18 1058  ALLERGIES:   Review of patient's allergies indicates:   Allergen Reactions    Actemra [tocilizumab] Other (See Comments)     Severe dizziness    Codeine Nausea And Vomiting    Gold au 198 Hives and Rash    Hydroxychloroquine Other (See Comments)     Can't remember the reaction      Iodinated contrast- oral and iv dye Other (See Comments)     Other reaction(s): BURNING ALL OVER    Iodine Other (See Comments)     Other reaction(s): BURNING ALL OVER - Iodine dye - Not topical    Sulfa (sulfonamide antibiotics) Other (See Comments)     Can't remember the reaction    Zofran [ondansetron hcl (pf)] Nausea And Vomiting     Pt reports that last time she received zofran she started vomiting again    Methotrexate analogues Nausea Only    Pneumovax 23 [pneumococcal 23-argenis ps vaccine] Other (See Comments)     "sick"       VITAL SIGNS: Ht 5' 1" (1.549 m)   Wt 70.8 kg (156 lb)   LMP  (LMP Unknown)   BMI 29.48 kg/m²        PHYSICAL EXAM /  HIP  PHYSICAL EXAMINATION  General:  " The patient is alert and oriented x 3.  Mood is pleasant.  Observation of ears, eyes and nose reveal no gross abnormalities.  HEENT: NCAT, sclera nonicteric  Lungs: Respirations are equal and unlabored..    left HIP EXAMINATION     OBSERVATION / INSPECTION  Gait:   Nonantalgic   Alignment:  Neutral   Scars:   None   Muscle atrophy: None   Effusion:  None   Warmth:  None   Discoloration:   None   Leg lengths:   Equal   Pelvis:   Level     TENDERNESS / CREPITUS (T/C):      T / C  Trochanteric bursa    + / -  Piriformis    - / -  SI joint    - / -  Psoas tendon   - / -  Rectus insertion  - / -  Adductor origin  -/ -  Pubic symphysis  - / -  IT band                                   + / -  Gluteus tendons                     + / -    ROM: (* = pain)    Flexion:    120 degrees  External rotation: 40 degrees  Internal rotation with axial load: 25 degrees  Internal rotation without axial load: 35 degrees  Abduction:  40 degrees*  Adduction:   20 degrees    SPECIAL TESTS:  Pain w/ forced internal rotation (FADIR): -   Pain w/ forced external rotation (SANDRA): +   Circumduction test:    -  Stinchfield test:    +  Log roll:      Negative   Snapping hip (internal):   Negative   Step-down test:    +  Trendelenburg test:    Negative  Bridge test     +     EXTREMITY NEURO-VASCULAR EXAMINATION:   Sensation:  Grossly intact to light touch all dermatomal regions.   Motor Function:  Fully intact motor function at hip, knee, foot and ankle    DTRs;  quadriceps and  achilles 2+.  No clonus and downgoing Babinski.    Vascular status:  DP and PT pulses 2+, brisk capillary refill, symmetric.    Skin:  intact, compartments soft.    OTHER FINDINGS:    MRI left hip from 1/28/19 interpreted by Dr. Duff and team:  Previous MRI pelvis reviewed. T1 shows small area of bony edema at compression side of left femoral neck, not involving the cortex. This does not correlate with a fracture line on T2. Therefore would consider this a stress  reaction which could be related to adductor strain.    CT left hip from 1/31/19:  The visualized intraabdominal content is unremarkable.  There is degenerative change visualized within the lower lumbar spine including vacuum disc phenomena at L5-S1.  The hip joint space is satisfactorily preserved.  There is osseous spur at the greater trochanter.  There is no fracture, dislocation, or bony erosion.  Specifically there is no fracture along the medial base of the left femoral neck within area of abnormal edematous signal seen on MRI dated 01/28/2019.    XRAYS:  2 hip/pelvis views were independently reviewed and interpreted by myself.  No fracture, subluxation. Mild left hip DJD noted.    ASSESSMENT:    1. left hip pain, acute  2. Left hip adductor pain/tendinitis  3. Left hip flexor pain  4. Trochanteric bursitis, left   5. IT band pain, left  S/p stress reaction of left femoral neck  hip abd/core weakness    Appears that her femur stress reaction pain has resolved and now she is having multiple areas of hip pain and tendinitis from core, hip, pelvis muscle weakness. Still no signs of return of stress reaction pain.    PLAN:  1.  Again be reasonable this point to repeat the MRI to evaluate the prior area of stress reaction as she has not had relief for the past for 5 months.  We did discuss that she is at increased risk for stress fracture as she is on chronic steroid use.  2.  She is currently on Prolia for her osteopenia  3.  We will call her with the results of her MRI.  All questions were answered, pt will contact us for questions or concerns in the interim.

## 2019-05-13 NOTE — LETTER
May 20, 2019      Joe Garcia III, MD  [address]  [facility] [address]           [facility]  [address]  [facility] [address]  Phone: [phone]          Patient: Joyce Peterson   MR Number: [id number]   YOB: 1960   Date of Visit: 5/13/2019       Dear Dr. Joe Garcia III:    Thank you for referring Joyce Peterson to me for evaluation. Attached you will find relevant portions of my assessment and plan of care.    If you have questions, please do not hesitate to call me. I look forward to following Joyce Peterson along with you.    Sincerely,    Brianna Hinson MD    Enclosure  CC:  No Recipients    If you would like to receive this communication electronically, please contact [email or web address] or [phone] to request more information on [employer] access.    For providers and/or their staff who would like to refer a patient to [facility], please contact us through our one-stop-shop provider referral line, [facility], at [phone].    If you feel you have received this communication in error or would no longer like to receive these types of communications, please e-mail [email or web address]

## 2019-05-14 ENCOUNTER — PATIENT MESSAGE (OUTPATIENT)
Dept: SPORTS MEDICINE | Facility: CLINIC | Age: 59
End: 2019-05-14

## 2019-05-16 ENCOUNTER — HOSPITAL ENCOUNTER (OUTPATIENT)
Dept: RADIOLOGY | Facility: HOSPITAL | Age: 59
Discharge: HOME | End: 2019-05-16
Attending: ORTHOPAEDIC SURGERY
Payer: MEDICARE

## 2019-05-16 ENCOUNTER — PATIENT MESSAGE (OUTPATIENT)
Dept: SPORTS MEDICINE | Facility: CLINIC | Age: 59
End: 2019-05-16

## 2019-05-16 DIAGNOSIS — M70.62 TROCHANTERIC BURSITIS OF LEFT HIP: ICD-10-CM

## 2019-05-16 DIAGNOSIS — M84.353D STRESS FRACTURE OF NECK OF FEMUR WITH ROUTINE HEALING, SUBSEQUENT ENCOUNTER: ICD-10-CM

## 2019-05-16 PROCEDURE — 73721 MRI HIP WITHOUT CONTRAST LEFT: ICD-10-PCS | Mod: 26,LT,, | Performed by: RADIOLOGY

## 2019-05-16 PROCEDURE — 73721 MRI JNT OF LWR EXTRE W/O DYE: CPT | Mod: 26,LT,, | Performed by: RADIOLOGY

## 2019-05-16 PROCEDURE — 73721 MRI JNT OF LWR EXTRE W/O DYE: CPT | Mod: TC,LT

## 2019-05-17 ENCOUNTER — TELEPHONE (OUTPATIENT)
Dept: SPORTS MEDICINE | Facility: CLINIC | Age: 59
End: 2019-05-17

## 2019-05-17 ENCOUNTER — PATIENT MESSAGE (OUTPATIENT)
Dept: SPORTS MEDICINE | Facility: CLINIC | Age: 59
End: 2019-05-17

## 2019-05-17 DIAGNOSIS — M84.353D STRESS FRACTURE OF NECK OF FEMUR WITH ROUTINE HEALING, SUBSEQUENT ENCOUNTER: Primary | ICD-10-CM

## 2019-05-17 DIAGNOSIS — M25.552 LEFT HIP PAIN: ICD-10-CM

## 2019-05-17 NOTE — TELEPHONE ENCOUNTER
----- Message from Lynette Toribio sent at 5/17/2019  3:45 PM CDT -----  Contact: Self  Needs Advice    Reason for call:pt is still wanting to hear from dr hinson, she has a appt with addy on Monday wanting to know if she can switch that appt to dr Hinson         Communication Preference:[phone]    Additional Information:

## 2019-05-18 NOTE — TELEPHONE ENCOUNTER
Called patient and discussed MRI results after review and discussion with Dr. Hinson.    MRI shows:    Previous stress injury of the medial femoral neck demonstrates improvement in bone marrow edema and interval development of transversely oriented low T1 signal, compatible with chronic stress fracture.  There is interval development of bone marrow edema along the lateral side of the femoral neck concerning for new stress injury.       Bone marrow edema of the lateral left femoral neck consistent with developing tensile sided stress fracture.      Dr. Hinson recommends IM nail vs pinning with Dr. Duff, whose team will reach out to patient on Monday.     All patients questions were answered. Patient was advised to call us with any concerns or questions.

## 2019-05-20 ENCOUNTER — OFFICE VISIT (OUTPATIENT)
Dept: ORTHOPEDICS | Facility: CLINIC | Age: 59
End: 2019-05-20
Payer: MEDICARE

## 2019-05-20 ENCOUNTER — OFFICE VISIT (OUTPATIENT)
Dept: SPORTS MEDICINE | Facility: CLINIC | Age: 59
End: 2019-05-20
Payer: MEDICARE

## 2019-05-20 ENCOUNTER — ANESTHESIA EVENT (OUTPATIENT)
Dept: SURGERY | Facility: HOSPITAL | Age: 59
DRG: 482 | End: 2019-05-20
Payer: MEDICARE

## 2019-05-20 VITALS
SYSTOLIC BLOOD PRESSURE: 127 MMHG | BODY MASS INDEX: 29.07 KG/M2 | WEIGHT: 154 LBS | HEIGHT: 61 IN | DIASTOLIC BLOOD PRESSURE: 82 MMHG | HEART RATE: 106 BPM

## 2019-05-20 VITALS
HEIGHT: 61 IN | WEIGHT: 156 LBS | SYSTOLIC BLOOD PRESSURE: 122 MMHG | BODY MASS INDEX: 29.45 KG/M2 | DIASTOLIC BLOOD PRESSURE: 81 MMHG | HEART RATE: 96 BPM

## 2019-05-20 DIAGNOSIS — K31.84 GASTROPARESIS: Primary | ICD-10-CM

## 2019-05-20 DIAGNOSIS — Z00.6 PATIENT IN CLINICAL RESEARCH STUDY: ICD-10-CM

## 2019-05-20 DIAGNOSIS — M84.353G: ICD-10-CM

## 2019-05-20 DIAGNOSIS — M84.352A STRESS FRACTURE OF NECK OF LEFT FEMUR: Primary | ICD-10-CM

## 2019-05-20 PROCEDURE — 99215 PR OFFICE/OUTPT VISIT, EST, LEVL V, 40-54 MIN: ICD-10-PCS | Mod: S$GLB,,, | Performed by: ORTHOPAEDIC SURGERY

## 2019-05-20 PROCEDURE — 99215 OFFICE O/P EST HI 40 MIN: CPT | Mod: S$GLB,,, | Performed by: ORTHOPAEDIC SURGERY

## 2019-05-20 PROCEDURE — 99999 PR PBB SHADOW E&M-EST. PATIENT-LVL IV: CPT | Mod: PBBFAC,,, | Performed by: ORTHOPAEDIC SURGERY

## 2019-05-20 PROCEDURE — 99999 PR PBB SHADOW E&M-EST. PATIENT-LVL III: ICD-10-PCS | Mod: PBBFAC,,, | Performed by: ORTHOPAEDIC SURGERY

## 2019-05-20 PROCEDURE — 99214 PR OFFICE/OUTPT VISIT, EST, LEVL IV, 30-39 MIN: ICD-10-PCS | Mod: S$GLB,,, | Performed by: ORTHOPAEDIC SURGERY

## 2019-05-20 PROCEDURE — 3008F PR BODY MASS INDEX (BMI) DOCUMENTED: ICD-10-PCS | Mod: CPTII,S$GLB,, | Performed by: ORTHOPAEDIC SURGERY

## 2019-05-20 PROCEDURE — 3008F BODY MASS INDEX DOCD: CPT | Mod: CPTII,S$GLB,, | Performed by: ORTHOPAEDIC SURGERY

## 2019-05-20 PROCEDURE — 99999 PR PBB SHADOW E&M-EST. PATIENT-LVL III: CPT | Mod: PBBFAC,,, | Performed by: ORTHOPAEDIC SURGERY

## 2019-05-20 PROCEDURE — 99214 OFFICE O/P EST MOD 30 MIN: CPT | Mod: S$GLB,,, | Performed by: ORTHOPAEDIC SURGERY

## 2019-05-20 PROCEDURE — 99999 PR PBB SHADOW E&M-EST. PATIENT-LVL IV: ICD-10-PCS | Mod: PBBFAC,,, | Performed by: ORTHOPAEDIC SURGERY

## 2019-05-20 RX ORDER — SODIUM CHLORIDE 9 MG/ML
INJECTION, SOLUTION INTRAVENOUS CONTINUOUS
Status: CANCELLED | OUTPATIENT
Start: 2019-05-20

## 2019-05-20 RX ORDER — MUPIROCIN 20 MG/G
OINTMENT TOPICAL
Status: CANCELLED | OUTPATIENT
Start: 2019-05-20

## 2019-05-20 NOTE — TELEPHONE ENCOUNTER
Call attempt for Kevzara side effect management. Medication was already filled 5/7 without intervention. LVM for callback.

## 2019-05-20 NOTE — H&P (VIEW-ONLY)
Subjective:     Patient is a 58 y.o. female with pain in the left hip since January of this year.  The pain has worsened with ambulation.  She has a stress fracture which on MRI imaging from 05/15/2019 shows increased signal in both T1 and T2 traversing the basicervical region of the femoral neck into the intertrochanteric region on the tense I will and compressive sides.  Her pain has worsened.  She has been toe-touch weight-bearing for a little over a week now.    Patient Active Problem List    Diagnosis Date Noted    Stress fracture of neck of left femur 05/20/2019    Trochanteric bursitis of left hip 05/13/2019    Acute hip pain, left 03/20/2019    Gait abnormality 03/20/2019    Left hip pain 03/06/2019    Rupture of left triceps tendon 10/17/2018    Esophageal candidiasis 10/15/2018    Sjogren's syndrome 05/21/2018    Research study patient 04/30/2018    Pure hypercholesterolemia 01/08/2018    Iron deficiency anemia due to chronic blood loss 11/14/2017    Right shoulder pain 08/21/2017    Pain in right hand 06/30/2017    Pain in right elbow 06/30/2017    Decreased  strength of right hand 06/30/2017    Right carpal tunnel syndrome 05/29/2017    Elevated parathyroid hormone 05/10/2017    Hypogammaglobulinemia 05/10/2017    Carpal tunnel syndrome of right wrist 04/04/2017    Rheumatoid arthritis involving multiple sites 11/16/2016    Immunosuppressed status 03/03/2016    Uncomplicated asthma 11/04/2015    AR (allergic rhinitis) 11/04/2015    Thyroid nodule 08/22/2013    Hyperlipidemia 08/22/2013    Lumbar spondylosis 03/08/2013    CS (cervical spondylosis) 03/08/2013    Coronary artery disease 02/06/2013    Osteopenia 11/29/2012    Gastroparesis 08/07/2012    Osteoarthritis 08/01/2012    GERD (gastroesophageal reflux disease) 08/01/2012     Past Medical History:   Diagnosis Date    Acid reflux     Allergy     Anemia     Asthma     Coronary artery disease     Degenerative  disc disease     Dry eyes     Dry mouth     Gastroparesis     Hyperlipidemia     Lateral meniscus derangement 4/6/2016    Lobular carcinoma in situ     Osteoarthritis     Osteoporosis     Rheumatoid arthritis(714.0)     Umbilical hernia 8/13/2015      Past Surgical History:   Procedure Laterality Date    ARTHROSCOPY-KNEE Right 4/6/2016    Performed [facility] OR 2ND FLR    BREAST BIOPSY Left 01/29/2002    core bx    CARPAL TUNNEL RELEASE Right 05/2017    CHOLECYSTECTOMY  2004    COLONOSCOPY      10/11    COLONOSCOPY N/A 6/29/2017    Performed by López Moore MD at [facility] ENDO (4TH FLR)    ESOPHAGOGASTRODUODENOSCOPY (EGD) N/A 6/29/2017    Performed by L[facility]pez Moore [facility] at [facility] ENDO (4TH FLR)    RELEASE-CARPAL TUNNEL / Right Right 5/29/2017    [facility]    REPAIR, TENDON, TRICEPS left elbow Left 10/17/2018    Performed by [facility] at [facility] OR 1ST FLR    REPAIR-HERNIA-UMBILICAL N/A 8/13/2015    Performed by Ricardo Small MD at [facility] OR 2ND FLR    TONSILLECTOMY      TUBAL LIGATION  2003    UPPER GASTROINTESTINAL ENDOSCOPY      10/11    uterine ablation  2003        (Not in a hospital admission)  Review of patient's allergies indicates:   Allergen Reactions    Actemra [tocilizumab] Other (See Comments)     Severe dizziness    Codeine Nausea And Vomiting    Gold au 198 Hives and Rash    Hydroxychloroquine Other (See Comments)     Can't remember the reaction      Iodinated contrast- oral and iv dye Other (See Comments)     Other reaction(s): BURNING ALL OVER    Iodine Other (See Comments)     Other reaction(s): BURNING ALL OVER - Iodine dye - Not topical    Sulfa (sulfonamide antibiotics) Other (See Comments)     Can't remember the reaction    Zofran [ondansetron hcl (pf)] Nausea And Vomiting     Pt reports that last time she received zofran she started vomiting again    Methotrexate analogues Nausea Only  "   Pneumovax 23 [pneumococcal 23-argenis ps vaccine] Other (See Comments)     "sick"      Social History     Tobacco Use    Smoking status: Never Smoker    Smokeless tobacco: Never Used   Substance Use Topics    Alcohol use: Yes     Comment: occasionally      Family History   Problem Relation Age of Onset    Cancer Mother         Lung Cancer    Emphysema Mother     Heart attack Mother     Cancer Father         Lung Cancer    Osteoarthritis Father     Lung cancer Father     Skin cancer Father     Heart disease Brother     Heart attack Brother     Osteoarthritis Paternal Aunt     Retinal detachment Maternal Aunt     No Known Problems Sister     No Known Problems Maternal Uncle     No Known Problems Paternal Uncle     No Known Problems Maternal Grandmother     No Known Problems Maternal Grandfather     No Known Problems Paternal Grandmother     No Known Problems Paternal Grandfather     Colon cancer Neg Hx     Esophageal cancer Neg Hx     Stomach cancer Neg Hx     Celiac disease Neg Hx     Diabetes Neg Hx     Thyroid disease Neg Hx     Amblyopia Neg Hx     Blindness Neg Hx     Cataracts Neg Hx     Glaucoma Neg Hx     Hypertension Neg Hx     Macular degeneration Neg Hx     Strabismus Neg Hx     Stroke Neg Hx       ROS:  Patient denies constitutional symptoms, cardiac symptoms, respiratory symptoms, GI symptoms.  The remainder of the musculoskeletal ROS is included in the HPI.      Objective:     PE:    AA&O x 4.  NAD  HEENT:  NCAT, sclera nonicteric  Lungs:  Respirations are equal and unlabored.  CV:  2+ bilateral upper and lower extremity pulses.  Skin:  Intact throughout.    MS:  Right hip with some anterior lateral tenderness to palpation.  Pain with weight-bearing.  Neurovascularly intact distally.    Imaging Review  MRI from 05/15/2019 shows stress fracture going from the transcervical region through the basicervical region of the femur and to the intertrochanteric region on both the " tense [place] compressive side.    Assessment:     Left femoral neck and intertrochanteric a stress fracture, nondisplaced    Plan:     I discussed the options with the patient at length.  She has had worsening pain and MRI changes on the tense I will side of her hip.  At this point we need to proceed with stabilization.  I will place a cephalomedullary nail.  She has rheumatoid arthritis and has been on long-term steroids but on a lower dose.  This will have to continue using although she is at increased risk for infection and I have discussed this with her.  She is also recently been taking Kevzara although her last dose was over a week ago.  Plan for OR tomorrow.  The risks, benefits and alternatives to surgery were discussed with the patient at great length.  These include bleeding, infection, vessel/nerve damage, pain, numbness, tingling, complex regional pain syndrome, hardware/surgical failure, need for further surgery, malunion, nonunion, AVN, DVT, PE, arthritis and death.  We discussed the high mortality rate with hip fractures in the 1st year, however she is a functional and ambulatory patient who I suspect will likely do very well postoperatively and go home with home health.  Patient states an understanding and wishes to proceed with surgery.   All questions were answered.  No guarantees were implied or stated.  Informed consent was obtained.

## 2019-05-20 NOTE — PROGRESS NOTES
Subjective:     Patient is a 58 y.o. female with pain in the left hip since January of this year.  The pain has worsened with ambulation.  She has a stress fracture which on MRI imaging from 05/15/2019 shows increased signal in both T1 and T2 traversing the basicervical region of the femoral neck into the intertrochanteric region on the tense I will and compressive sides.  Her pain has worsened.  She has been toe-touch weight-bearing for a little over a week now.    Patient Active Problem List    Diagnosis Date Noted    Stress fracture of neck of left femur 05/20/2019    Trochanteric bursitis of left hip 05/13/2019    Acute hip pain, left 03/20/2019    Gait abnormality 03/20/2019    Left hip pain 03/06/2019    Rupture of left triceps tendon 10/17/2018    Esophageal candidiasis 10/15/2018    Sjogren's syndrome 05/21/2018    Research study patient 04/30/2018    Pure hypercholesterolemia 01/08/2018    Iron deficiency anemia due to chronic blood loss 11/14/2017    Right shoulder pain 08/21/2017    Pain in right hand 06/30/2017    Pain in right elbow 06/30/2017    Decreased  strength of right hand 06/30/2017    Right carpal tunnel syndrome 05/29/2017    Elevated parathyroid hormone 05/10/2017    Hypogammaglobulinemia 05/10/2017    Carpal tunnel syndrome of right wrist 04/04/2017    Rheumatoid arthritis involving multiple sites 11/16/2016    Immunosuppressed status 03/03/2016    Uncomplicated asthma 11/04/2015    AR (allergic rhinitis) 11/04/2015    Thyroid nodule 08/22/2013    Hyperlipidemia 08/22/2013    Lumbar spondylosis 03/08/2013    CS (cervical spondylosis) 03/08/2013    Coronary artery disease 02/06/2013    Osteopenia 11/29/2012    Gastroparesis 08/07/2012    Osteoarthritis 08/01/2012    GERD (gastroesophageal reflux disease) 08/01/2012     Past Medical History:   Diagnosis Date    Acid reflux     Allergy     Anemia     Asthma     Coronary artery disease     Degenerative  disc disease     Dry eyes     Dry mouth     Gastroparesis     Hyperlipidemia     Lateral meniscus derangement 4/6/2016    Lobular carcinoma in situ     Osteoarthritis     Osteoporosis     Rheumatoid arthritis(714.0)     Umbilical hernia 8/13/2015      Past Surgical History:   Procedure Laterality Date    ARTHROSCOPY-KNEE Right 4/6/2016    [facility] OR 2ND FLR    BREAST BIOPSY Left 01/29/2002    core bx    CARPAL TUNNEL RELEASE Right 05/2017    CHOLECYSTECTOMY  2004    COLONOSCOPY      10/11    COLONOSCOPY N/A 6/29/2017    Performed by L[facility]pez Moore [facility] at [facility] ENDO (4TH FLR)    ESOPHAGOGASTRODUODENOSCOPY (EGD) N/A 6/29/2017    Performed by L[facility]pez Moore [facility] at [facility] ENDO (4TH FLR)    RELEASE-CARPAL TUNNEL / Right Right 5/29/2017    [facility]    REPAIR, TENDON, TRICEPS left elbow Left 10/17/2018    [facility] at [facility] OR 1ST FLR    REPAIR-HERNIA-UMBILICAL N/A 8/13/2015    Performed by Ricardo Small [facility] at [facility] OR 2ND FLR    TONSILLECTOMY      TUBAL LIGATION  2003    UPPER GASTROINTESTINAL ENDOSCOPY      10/11    uterine ablation  2003        (Not in a hospital admission)  Review of patient's allergies indicates:   Allergen Reactions    Actemra [tocilizumab] Other (See Comments)     Severe dizziness    Codeine Nausea And Vomiting    Gold au 198 Hives and Rash    Hydroxychloroquine Other (See Comments)     Can't remember the reaction      Iodinated contrast- oral and iv dye Other (See Comments)     Other reaction(s): BURNING ALL OVER    Iodine Other (See Comments)     Other reaction(s): BURNING ALL OVER - Iodine dye - Not topical    Sulfa (sulfonamide antibiotics) Other (See Comments)     Can't remember the reaction    Zofran [ondansetron hcl (pf)] Nausea And Vomiting     Pt reports that last time she received zofran she started vomiting again    Methotrexate analogues Nausea Only  "   Pneumovax 23 [pneumococcal 23-argenis ps vaccine] Other (See Comments)     "sick"      Social History     Tobacco Use    Smoking status: Never Smoker    Smokeless tobacco: Never Used   Substance Use Topics    Alcohol use: Yes     Comment: occasionally      Family History   Problem Relation Age of Onset    Cancer Mother         Lung Cancer    Emphysema Mother     Heart attack Mother     Cancer Father         Lung Cancer    Osteoarthritis Father     Lung cancer Father     Skin cancer Father     Heart disease Brother     Heart attack Brother     Osteoarthritis Paternal Aunt     Retinal detachment Maternal Aunt     No Known Problems Sister     No Known Problems Maternal Uncle     No Known Problems Paternal Uncle     No Known Problems Maternal Grandmother     No Known Problems Maternal Grandfather     No Known Problems Paternal Grandmother     No Known Problems Paternal Grandfather     Colon cancer Neg Hx     Esophageal cancer Neg Hx     Stomach cancer Neg Hx     Celiac disease Neg Hx     Diabetes Neg Hx     Thyroid disease Neg Hx     Amblyopia Neg Hx     Blindness Neg Hx     Cataracts Neg Hx     Glaucoma Neg Hx     Hypertension Neg Hx     Macular degeneration Neg Hx     Strabismus Neg Hx     Stroke Neg Hx       ROS:  Patient denies constitutional symptoms, cardiac symptoms, respiratory symptoms, GI symptoms.  The remainder of the musculoskeletal ROS is included in the HPI.      Objective:     PE:    AA&O x 4.  NAD  HEENT:  NCAT, sclera nonicteric  Lungs:  Respirations are equal and unlabored.  CV:  2+ bilateral upper and lower extremity pulses.  Skin:  Intact throughout.    MS:  Right hip with some anterior lateral tenderness to palpation.  Pain with weight-bearing.  Neurovascularly intact distally.    Imaging Review  MRI from 05/15/2019 shows stress fracture going from the transcervical region through the basicervical region of the femur and to the intertrochanteric region on both the " tense [place] compressive side.    Assessment:     Left femoral neck and intertrochanteric a stress fracture, nondisplaced    Plan:     I discussed the options with the patient at length.  She has had worsening pain and MRI changes on the tense I will side of her hip.  At this point we need to proceed with stabilization.  I will place a cephalomedullary nail.  She has rheumatoid arthritis and has been on long-term steroids but on a lower dose.  This will have to continue using although she is at increased risk for infection and I have discussed this with her.  She is also recently been taking Kevzara although her last dose was over a week ago.  Plan for OR tomorrow.  The risks, benefits and alternatives to surgery were discussed with the patient at great length.  These include bleeding, infection, vessel/nerve damage, pain, numbness, tingling, complex regional pain syndrome, hardware/surgical failure, need for further surgery, malunion, nonunion, AVN, DVT, PE, arthritis and death.  We discussed the high mortality rate with hip fractures in the 1st year, however she is a functional and ambulatory patient who I suspect will likely do very well postoperatively and go home with home health.  Patient states an understanding and wishes to proceed with surgery.   All questions were answered.  No guarantees were implied or stated.  Informed consent was obtained.

## 2019-05-20 NOTE — PROGRESS NOTES
CC: left hip pain    HPI:   Joyce Peterson is a pleasant 58 y.o. patient who reports to clinic with left hip pain. No [facility]    Here today for follow-up of her left hip pain and MRI results.  Repeat MRI showed continued edema in the medial calcar with extension into the lateral femoral neck. No discrete fracture line visualized but this is concerning for early stress fracture. She has been nonweightbearing since her MRI results.  She is scheduled to see Dr. Duff for possible operative fixation.        She has had hip pain since January 2019.  This started insidiously without trauma.  The pain is located in her groin.  It is worse with ambulation and movement.  She was seen by her rheumatologist who ordered x-rays which showed no abnormality or joint space narrowing. He she had an MRI which showed edema along the medial calcar with no definitive fracture line and no involvement of the cortex. There was also mild cartilage thinning with degenerative labral tear as well as edema around the abductor insertion without definite tear. She was treated non operatively with non weight-bearing for 6 weeks.  She has tried physical therapy and which has helped some but never fully relieved her pain. She was seen by Dr. Joe Garcia who gave her a left hip greater troch bursal injection. She states this provided about 40% relief but it has since worn off.  It and it never gave her any relief for her groin pain. She has been using a cane for ambulation since January.  She saw Khurram Julien in April.  At that time she was having more muscular pain in her groin.  She did physical therapy with dry needling which resolved the pain.      She has a PMH of Rheumatoid Arthritis see by Dr. Moran.  She is on chronic prednisone treatment currently on 5 mg daily   PMH of 9 stress fractures in the past to various areas.     Medical history notable for osteopenia (on Prolia (denosumab) and citracal+D, last dexa  2/28/19 T-score -2.1 lumbar vertebra, -1.1 left femoral neck) and RA (on Kevzara and prednisone 5 mg)    Review of Systems   Constitution: Negative. Negative for chills, fever and night sweats.   HENT: Negative for congestion and headaches.    Eyes: Negative for blurred vision, left vision loss and right vision loss.   Cardiovascular: Negative for chest pain and syncope.   Respiratory: Negative for cough and shortness of breath.    Endocrine: Negative for polydipsia, polyphagia and polyuria.   Hematologic/Lymphatic: Negative for bleeding problem. Does not bruise/bleed easily.   Skin: Negative for dry skin, itching and rash.   Musculoskeletal: Negative for falls and muscle weakness.   Gastrointestinal: Negative for abdominal pain and bowel incontinence.   Genitourinary: Negative for bladder incontinence and nocturia.   Neurological: Negative for disturbances in coordination, loss of balance and seizures.   Psychiatric/Behavioral: Negative for depression. The patient does not have insomnia.    Allergic/Immunologic: Negative for hives and persistent infections.   All other systems negative.    PAST MEDICAL HISTORY:   Past Medical History:   Diagnosis Date    Acid reflux     Allergy     Anemia     Asthma     Coronary artery disease     Degenerative disc disease     Dry eyes     Dry mouth     Gastroparesis     Hyperlipidemia     Lateral meniscus derangement 4/6/2016    Lobular carcinoma in situ     Osteoarthritis     Osteoporosis     Rheumatoid arthritis(714.0)     Umbilical hernia 8/13/2015     PAST SURGICAL HISTORY:   Past Surgical History:   Procedure Laterality Date    ARTHROSCOPY-KNEE Right 4/6/2016    [facility] OR 2ND FLR    BREAST BIOPSY Left 01/29/2002    core bx    CARPAL TUNNEL RELEASE Right 05/2017    CHOLECYSTECTOMY  2004    COLONOSCOPY      10/11    COLONOSCOPY N/A 6/29/2017    Performed by López Moore [facility] at [facility] ENDO (4TH FLR)     ESOPHAGOGASTRODUODENOSCOPY (EGD) N/A 6/29/2017    Performed by [facility] at [facility] ENDO (4TH FLR)    RELEASE-CARPAL TUNNEL / Right Right 5/29/2017    [facility] OR    REPAIR, TENDON, TRICEPS left elbow Left 10/17/2018    [facility] at [facility] OR 1ST FLR    REPAIR-HERNIA-UMBILICAL N/A 8/13/2015    Performed by Ricardo Small [facility] at [facility] OR 2ND FLR    TONSILLECTOMY      TUBAL LIGATION  2003    UPPER GASTROINTESTINAL ENDOSCOPY      10/11    uterine ablation  2003     FAMILY HISTORY:   Family History   Problem Relation Age of Onset    Cancer Mother         Lung Cancer    Emphysema Mother     Heart attack Mother     Cancer Father         Lung Cancer    Osteoarthritis Father     Lung cancer Father     Skin cancer Father     Heart disease Brother     Heart attack Brother     Osteoarthritis Paternal Aunt     Retinal detachment Maternal Aunt     No Known Problems Sister     No Known Problems Maternal Uncle     No Known Problems Paternal Uncle     No Known Problems Maternal Grandmother     No Known Problems Maternal Grandfather     No Known Problems Paternal Grandmother     No Known Problems Paternal Grandfather     Colon cancer Neg Hx     Esophageal cancer Neg Hx     Stomach cancer Neg Hx     Celiac disease Neg Hx     Diabetes Neg Hx     Thyroid disease Neg Hx     Amblyopia Neg Hx     Blindness Neg Hx     Cataracts Neg Hx     Glaucoma Neg Hx     Hypertension Neg Hx     Macular degeneration Neg Hx     Strabismus Neg Hx     Stroke Neg Hx      SOCIAL HISTORY:   Social History     Socioeconomic History    Marital status:      Spouse name: Not on file    Number of children: Not on file    Years of education: Not on file    Highest education level: Not on file   Occupational History    Not on file   Social Needs    Financial resource strain: Not on file    Food insecurity:     Worry: Not on file     Inability:  Not on file    Transportation needs:     Medical: Not on file     Non-medical: Not on file   Tobacco Use    Smoking status: Never Smoker    Smokeless tobacco: Never Used   Substance and Sexual Activity    Alcohol use: Yes     Comment: occasionally    Drug use: No    Sexual activity: Yes     Partners: Male     Birth control/protection: Surgical   Lifestyle    Physical activity:     Days per week: Not on file     Minutes per session: Not on file    Stress: Not on file   Relationships    Social connections:     Talks on phone: Not on file     Gets together: Not on file     Attends [employer] service: Not on file     Active member of club or organization: Not on file     Attends meetings of clubs or organizations: Not on file     Relationship status: Not on file   Other Topics Concern    Are you pregnant or think you may be? Not Asked    Breast-feeding Not Asked   Social History Narrative    Not on file       MEDICATIONS:   Current Outpatient Medications:     aspirin 81 mg Tab, Take 81 mg by mouth every morning. , Disp: , Rfl:     azelastine (ASTELIN) 137 mcg nasal spray, 1 spray (137 mcg total) by Nasal route 2 (two) times daily. (Patient taking differently: 1 spray by Nasal route 2 (two) times daily as needed. ), Disp: 30 mL, Rfl: 11    budesonide-formoterol 160-4.5 mcg (SYMBICORT) 160-4.5 mcg/actuation HFAA, Inhale 2 puffs into the lungs every 12 (twelve) hours., Disp: 3 Inhaler, Rfl: 3    calcium citrate-vitamin D (CITRACAL + D) 315-200 mg-unit per tablet, Take 1 tablet by mouth 2 (two) times daily. , Disp: , Rfl:     diclofenac sodium (VOLTAREN) 1 % Gel, Apply 2 g topically 4 (four) times daily as needed., Disp: 500 g, Rfl: 5    docusate sodium (COLACE) 100 MG capsule, Take 1 capsule (100 mg total) by mouth 3 (three) times daily., Disp: 90 capsule, Rfl: 0    flu vac [id number] 36mos up,PF, 60 mcg (15 mcg x 4)/0.5 mL Syrg, To be administered by Pharm D, Disp: 0.5 mL, Rfl: 0    fluconazole  (DIFLUCAN) 100 MG tablet, Take 100 mg by mouth once daily., Disp: , Rfl: 3    fluticasone (FLONASE) 50 mcg/actuation nasal spray, , Disp: , Rfl:     GUAIFENESIN (MUCINEX ORAL), Take 400 mg by mouth every 4 (four) hours as needed. , Disp: , Rfl:     INV PROPULSID 10 MG, Take 20 mg by mouth 4 (four) times daily. FOR INVESTIGATIONAL USE ONLY. Protocol [id number]-154, Disp: 1320 each, Rfl: 0    leflunomide (ARAVA) 20 MG Tab, Take 1 tablet (20 mg total) by mouth once daily., Disp: 30 tablet, Rfl: 1    montelukast (SINGULAIR) 10 mg tablet, Take 1 tablet (10 mg total) by mouth nightly., Disp: 90 tablet, Rfl: 3    naproxen (NAPROSYN) 500 MG tablet, TAKE 1 TABLET TWICE A DAY AS NEEDED, Disp: 180 tablet, Rfl: 1    omeprazole (PRILOSEC) 40 MG capsule, TAKE ONE CAPSULE BY MOUTH EVERY MORNING, Disp: 30 capsule, Rfl: 6    omeprazole-sodium bicarbonate (ZEGERID) 40-1.1 mg-gram per capsule, TAKE 1 CAPSULE BY MOUTH EVERY MORNING., Disp: 90 capsule, Rfl: 3    phenazopyridine (PYRIDIUM) 200 MG tablet, Take 1 tablet by mouth four times daily as needed for urinary pain., Disp: 16 tablet, Rfl: 2    POTASSIUM ORAL, Take by mouth once daily., Disp: , Rfl:     predniSONE (DELTASONE) 1 MG tablet, Take 1 tablet (1 mg total) by mouth once daily., Disp: 90 tablet, Rfl: 1    predniSONE (DELTASONE) 5 MG tablet, Take 1 tablet (5 mg total) by mouth once daily., Disp: 90 tablet, Rfl: 1    PREMARIN 0.625 mg tablet, Take 0.625 mg by mouth once daily., Disp: , Rfl: 4    progesterone (PROMETRIUM) 100 MG capsule, Take 100 mg by mouth every morning. 1 Capsule Oral Every day, morning, Disp: , Rfl:     PROVENTIL HFA 90 mcg/actuation inhaler, INHALE 2 PUFFS EVERY 6 HOURS AS NEEDED, Disp: 20.1 Inhaler, Rfl: 0    RESTASIS 0.05 % ophthalmic emulsion, PLACE 0.4 MLS (1 DROP TOTAL) INTO BOTH EYES 2 (TWO) TIMES DAILY., Disp: , Rfl: 5    rizatriptan (MAXALT) 10 MG tablet, Take 10 mg by mouth as needed for Migraine. , Disp: , Rfl:     rosuvastatin  "(CRESTOR) 20 MG tablet, Take 1 tablet (20 mg total) by mouth every evening., Disp: 90 tablet, Rfl: 3    sarilumab (KEVZARA) 200 mg/1.14 mL Syrg, Inject 1.14 mLs (200 mg total) into the skin every 14 (fourteen) days., Disp: 2.28 mL, Rfl: 2    sucralfate (CARAFATE) 1 gram tablet, TAKE 1 TABLET (1 G TOTAL) BY MOUTH 4 (FOUR) TIMES DAILY., Disp: 360 tablet, Rfl: 3    syringe with needle (TUBERCULIN SYRINGE) 1 mL 27 x 1/2" Syrg, To administer methotrexate 7.5mg sc once a week, Disp: 12 Syringe, Rfl: 3    traMADol (ULTRAM) 50 mg tablet, Take 1 tablet (50 mg total) by mouth every 24 hours as needed for Pain., Disp: 30 tablet, Rfl: 0    valACYclovir (VALTREX) 1000 MG tablet, TAKE 1 TABLET BY MOUTH TWICE A DAY AS NEEDED, Disp: 20 tablet, Rfl: 3    varicella-zoster [id number], PF, (SHINGRIX, PF,) 50 mcg/0.5 mL injection, Inject into the muscle., Disp: 0.5 mL, Rfl: 0  No current facility-administered medications for this visit.     Facility-Administered Medications Ordered in Other Visits:     0.9%  NaCl infusion, , Intravenous, Continuous, Callum Singer MD, Stopped at 10/17/18 1058  ALLERGIES:   Review of patient's allergies indicates:   Allergen Reactions    Actemra [tocilizumab] Other (See Comments)     Severe dizziness    Codeine Nausea And Vomiting    Gold au 198 Hives and Rash    Hydroxychloroquine Other (See Comments)     Can't remember the reaction      Iodinated contrast- oral and iv dye Other (See Comments)     Other reaction(s): BURNING ALL OVER    Iodine Other (See Comments)     Other reaction(s): BURNING ALL OVER - Iodine dye - Not topical    Sulfa (sulfonamide antibiotics) Other (See Comments)     Can't remember the reaction    Zofran [ondansetron hcl (pf)] Nausea And Vomiting     Pt reports that last time she received zofran she started vomiting again    Methotrexate analogues Nausea Only    Pneumovax 23 [pneumococcal 23-argenis ps vaccine] Other (See Comments)     "sick"       VITAL SIGNS: BP " "122/81   Pulse 96   Ht 5' 1" (1.549 m)   Wt 70.8 kg (156 lb)   LMP  (LMP Unknown)   BMI 29.48 kg/m²        PHYSICAL EXAM /  HIP  PHYSICAL EXAMINATION  General:  The patient is alert and oriented x 3.  Mood is pleasant.  Observation of ears, eyes and nose reveal no gross abnormalities.  HEENT: NCAT, sclera nonicteric  Lungs: Respirations are equal and unlabored..    left HIP EXAMINATION     OBSERVATION / INSPECTION  Gait:   Nonantalgic   Alignment:  Neutral   Scars:   None   Muscle atrophy: None   Effusion:  None   Warmth:  None   Discoloration:   None   Leg lengths:   Equal   Pelvis:   Level     TENDERNESS / CREPITUS (T/C):      T / C  Trochanteric bursa    + / -  Piriformis    - / -  SI joint    - / -  Psoas tendon   - / -  Rectus insertion  - / -  Adductor origin  -/ -  Pubic symphysis  - / -  IT band                                   + / -  Gluteus tendons                     + / -    ROM: (* = pain)    Flexion:    120 degrees  External rotation: 40 degrees  Internal rotation with axial load: 25 degrees  Internal rotation without axial load: 35 degrees  Abduction:  40 degrees*  Adduction:   20 degrees    SPECIAL TESTS:  Pain w/ forced internal rotation (FADIR): -   Pain w/ forced external rotation (SANDRA): +   Circumduction test:    -  Stinchfield test:    +  Log roll:      Negative   Snapping hip (internal):   Negative   Step-down test:    +  Trendelenburg test:    Negative  Bridge test     +     EXTREMITY NEURO-VASCULAR EXAMINATION:   Sensation:  Grossly intact to light touch all dermatomal regions.   Motor Function:  Fully intact motor function at hip, knee, foot and ankle    DTRs;  quadriceps and  achilles 2+.  No clonus and downgoing Babinski.    Vascular status:  DP and PT pulses 2+, brisk capillary refill, symmetric.    Skin:  intact, compartments soft.    OTHER FINDINGS:    MRI left hip from 1/28/19 interpreted by Dr. Duff and team:  Previous MRI pelvis reviewed. T1 shows small area of bony " edema at compression side of left femoral neck, not involving the cortex. This does not correlate with a fracture line on T2. Therefore would consider this a stress reaction which could be related to adductor strain.    CT left hip from 1/31/19:  The visualized intraabdominal content is unremarkable.  There is degenerative change visualized within the lower lumbar spine including vacuum disc phenomena at L5-S1.  The hip joint space is satisfactorily preserved.  There is osseous spur at the greater trochanter.  There is no fracture, dislocation, or bony erosion.  Specifically there is no fracture along the medial base of the left femoral neck within area of abnormal edematous signal seen on MRI dated 01/28/2019.    XRAYS:  2 hip/pelvis views were independently reviewed and interpreted by myself.  No fracture, subluxation. Mild left hip DJD noted.    ASSESSMENT:    1. left hip pain, acute  2.  Left hip femoral neck stress fracture  3.  Left hip abductor tendinosis and partial tear  .    PLAN:  1.  Repeat MRI showed signs concerning for stress fracture involving the tension side of the femoral neck. This is less likely to resolve with conservative treatment. I would like her to see my colleague trauma partner for evaluation for operative fixation  2.  She is currently on Prolia for her osteopenia  3.  Remain nonweightbearing  All questions were answered, pt will contact us for questions or concerns in the interim.

## 2019-05-20 NOTE — H&P
Subjective:     Patient is a 58 y.o. female with pain in the left hip since January of this year.  The pain has worsened with ambulation.  She has a stress fracture which on MRI imaging from 05/15/2019 shows increased signal in both T1 and T2 traversing the basicervical region of the femoral neck into the intertrochanteric region on the tense I will and compressive sides.  Her pain has worsened.  She has been toe-touch weight-bearing for a little over a week now.    Patient Active Problem List    Diagnosis Date Noted    Stress fracture of neck of left femur 05/20/2019    Trochanteric bursitis of left hip 05/13/2019    Acute hip pain, left 03/20/2019    Gait abnormality 03/20/2019    Left hip pain 03/06/2019    Rupture of left triceps tendon 10/17/2018    Esophageal candidiasis 10/15/2018    Sjogren's syndrome 05/21/2018    Research study patient 04/30/2018    Pure hypercholesterolemia 01/08/2018    Iron deficiency anemia due to chronic blood loss 11/14/2017    Right shoulder pain 08/21/2017    Pain in right hand 06/30/2017    Pain in right elbow 06/30/2017    Decreased  strength of right hand 06/30/2017    Right carpal tunnel syndrome 05/29/2017    Elevated parathyroid hormone 05/10/2017    Hypogammaglobulinemia 05/10/2017    Carpal tunnel syndrome of right wrist 04/04/2017    Rheumatoid arthritis involving multiple sites 11/16/2016    Immunosuppressed status 03/03/2016    Uncomplicated asthma 11/04/2015    AR (allergic rhinitis) 11/04/2015    Thyroid nodule 08/22/2013    Hyperlipidemia 08/22/2013    Lumbar spondylosis 03/08/2013    CS (cervical spondylosis) 03/08/2013    Coronary artery disease 02/06/2013    Osteopenia 11/29/2012    Gastroparesis 08/07/2012    Osteoarthritis 08/01/2012    GERD (gastroesophageal reflux disease) 08/01/2012     Past Medical History:   Diagnosis Date    Acid reflux     Allergy     Anemia     Asthma     Coronary artery disease     Degenerative  disc disease     Dry eyes     Dry mouth     Gastroparesis     Hyperlipidemia     Lateral meniscus derangement 4/6/2016    Lobular carcinoma in situ     Osteoarthritis     Osteoporosis     Rheumatoid arthritis(714.0)     Umbilical hernia 8/13/2015      Past Surgical History:   Procedure Laterality Date    ARTHROSCOPY-KNEE Right 4/6/2016    [facility] OR 2ND FLR    BREAST BIOPSY Left 01/29/2002    core bx    CARPAL TUNNEL RELEASE Right 05/2017    CHOLECYSTECTOMY  2004    COLONOSCOPY      10/11    COLONOSCOPY N/A 6/29/2017    Performed by L[facility]pez Moore [facility] at [facility] ENDO (4TH FLR)    ESOPHAGOGASTRODUODENOSCOPY (EGD) N/A 6/29/2017    Performed by L[facility] at [facility] ENDO (4TH FLR)    RELEASE-CARPAL TUNNEL / Right Right 5/29/2017    [facility] at [facility] OR    REPAIR, TENDON, TRICEPS left elbow Left 10/17/2018    Per[facility] at [facility] OR 1ST FLR    REPAIR-HERNIA-UMBILICAL N/A 8/13/2015    Performed by Ricardo Small MD at [facility] OR 2ND FLR    TONSILLECTOMY      TUBAL LIGATION  2003    UPPER GASTROINTESTINAL ENDOSCOPY      10/11    uterine ablation  2003        (Not in a hospital admission)  Review of patient's allergies indicates:   Allergen Reactions    Actemra [tocilizumab] Other (See Comments)     Severe dizziness    Codeine Nausea And Vomiting    Gold au 198 Hives and Rash    Hydroxychloroquine Other (See Comments)     Can't remember the reaction      Iodinated contrast- oral and iv dye Other (See Comments)     Other reaction(s): BURNING ALL OVER    Iodine Other (See Comments)     Other reaction(s): BURNING ALL OVER - Iodine dye - Not topical    Sulfa (sulfonamide antibiotics) Other (See Comments)     Can't remember the reaction    Zofran [ondansetron hcl (pf)] Nausea And Vomiting     Pt reports that last time she received zofran she started vomiting again    Methotrexate analogues Nausea Only  "   Pneumovax 23 [pneumococcal 23-argenis ps vaccine] Other (See Comments)     "sick"      Social History     Tobacco Use    Smoking status: Never Smoker    Smokeless tobacco: Never Used   Substance Use Topics    Alcohol use: Yes     Comment: occasionally      Family History   Problem Relation Age of Onset    Cancer Mother         Lung Cancer    Emphysema Mother     Heart attack Mother     Cancer Father         Lung Cancer    Osteoarthritis Father     Lung cancer Father     Skin cancer Father     Heart disease Brother     Heart attack Brother     Osteoarthritis Paternal Aunt     Retinal detachment Maternal Aunt     No Known Problems Sister     No Known Problems Maternal Uncle     No Known Problems Paternal Uncle     No Known Problems Maternal Grandmother     No Known Problems Maternal Grandfather     No Known Problems Paternal Grandmother     No Known Problems Paternal Grandfather     Colon cancer Neg Hx     Esophageal cancer Neg Hx     Stomach cancer Neg Hx     Celiac disease Neg Hx     Diabetes Neg Hx     Thyroid disease Neg Hx     Amblyopia Neg Hx     Blindness Neg Hx     Cataracts Neg Hx     Glaucoma Neg Hx     Hypertension Neg Hx     Macular degeneration Neg Hx     Strabismus Neg Hx     Stroke Neg Hx       ROS:  Patient denies constitutional symptoms, cardiac symptoms, respiratory symptoms, GI symptoms.  The remainder of the musculoskeletal ROS is included in the HPI.      Objective:     PE:    AA&O x 4.  NAD  HEENT:  NCAT, sclera nonicteric  Lungs:  Respirations are equal and unlabored.  CV:  2+ bilateral upper and lower extremity pulses.  Skin:  Intact throughout.    MS:  Right hip with some anterior lateral tenderness to palpation.  Pain with weight-bearing.  Neurovascularly intact distally.    Imaging Review  MRI from 05/15/2019 shows stress fracture going from the transcervical region through the basicervical region of the femur and to the intertrochanteric region on both the " tense [place] compressive side.    Assessment:     Left femoral neck and intertrochanteric a stress fracture, nondisplaced    Plan:     I discussed the options with the patient at length.  She has had worsening pain and MRI changes on the tense I will side of her hip.  At this point we need to proceed with stabilization.  I will place a cephalomedullary nail.  She has rheumatoid arthritis and has been on long-term steroids but on a lower dose.  This will have to continue using although she is at increased risk for infection and I have discussed this with her.  She is also recently been taking Kevzara although her last dose was over a week ago.  Plan for OR tomorrow.  The risks, benefits and alternatives to surgery were discussed with the patient at great length.  These include bleeding, infection, vessel/nerve damage, pain, numbness, tingling, complex regional pain syndrome, hardware/surgical failure, need for further surgery, malunion, nonunion, AVN, DVT, PE, arthritis and death.  We discussed the high mortality rate with hip fractures in the 1st year, however she is a functional and ambulatory patient who I suspect will likely do very well postoperatively and go home with home health.  Patient states an understanding and wishes to proceed with surgery.   All questions were answered.  No guarantees were implied or stated.  Informed consent was obtained.

## 2019-05-21 ENCOUNTER — ANESTHESIA (OUTPATIENT)
Dept: SURGERY | Facility: HOSPITAL | Age: 59
DRG: 482 | End: 2019-05-21
Payer: MEDICARE

## 2019-05-21 ENCOUNTER — HOSPITAL ENCOUNTER (INPATIENT)
Facility: HOSPITAL | Age: 59
LOS: 2 days | Discharge: HOME HEALTH | DRG: 482 | End: 2019-05-23
Attending: ORTHOPAEDIC SURGERY | Admitting: ORTHOPAEDIC SURGERY
Payer: MEDICARE

## 2019-05-21 DIAGNOSIS — M84.352A STRESS FRACTURE OF NECK OF LEFT FEMUR: Primary | ICD-10-CM

## 2019-05-21 LAB
ABO + RH BLD: NORMAL
BLD GP AB SCN CELLS X3 SERPL QL: NORMAL

## 2019-05-21 PROCEDURE — 76942 ECHO GUIDE FOR BIOPSY: CPT | Performed by: ANESTHESIOLOGY

## 2019-05-21 PROCEDURE — 63600175 PHARM REV CODE 636 W HCPCS: Performed by: ORTHOPAEDIC SURGERY

## 2019-05-21 PROCEDURE — 63600175 PHARM REV CODE 636 W HCPCS: Performed by: STUDENT IN AN ORGANIZED HEALTH CARE EDUCATION/TRAINING PROGRAM

## 2019-05-21 PROCEDURE — 71000033 HC RECOVERY, INTIAL HOUR: Performed by: ORTHOPAEDIC SURGERY

## 2019-05-21 PROCEDURE — 25000003 PHARM REV CODE 250: Performed by: STUDENT IN AN ORGANIZED HEALTH CARE EDUCATION/TRAINING PROGRAM

## 2019-05-21 PROCEDURE — C1769 GUIDE WIRE: HCPCS | Performed by: ORTHOPAEDIC SURGERY

## 2019-05-21 PROCEDURE — 27000221 HC OXYGEN, UP TO 24 HOURS

## 2019-05-21 PROCEDURE — 37000008 HC ANESTHESIA 1ST 15 MINUTES: Performed by: ORTHOPAEDIC SURGERY

## 2019-05-21 PROCEDURE — 63600175 PHARM REV CODE 636 W HCPCS: Performed by: ANESTHESIOLOGY

## 2019-05-21 PROCEDURE — 25000003 PHARM REV CODE 250: Performed by: ORTHOPAEDIC SURGERY

## 2019-05-21 PROCEDURE — 27245 PR OPEN FIX INTER/SUBTROCH FX,IMPLNT: ICD-10-PCS | Mod: LT,,, | Performed by: ORTHOPAEDIC SURGERY

## 2019-05-21 PROCEDURE — 25000003 PHARM REV CODE 250: Performed by: ANESTHESIOLOGY

## 2019-05-21 PROCEDURE — D9220A PRA ANESTHESIA: Mod: ,,, | Performed by: ANESTHESIOLOGY

## 2019-05-21 PROCEDURE — 64448 SUPRAINGUINAL FASCIA ILIACA CATHETER: ICD-10-PCS | Mod: 59,LT,, | Performed by: ANESTHESIOLOGY

## 2019-05-21 PROCEDURE — 27245 TREAT THIGH FRACTURE: CPT | Mod: LT,,, | Performed by: ORTHOPAEDIC SURGERY

## 2019-05-21 PROCEDURE — C1713 ANCHOR/SCREW BN/BN,TIS/BN: HCPCS | Performed by: ORTHOPAEDIC SURGERY

## 2019-05-21 PROCEDURE — 37000009 HC ANESTHESIA EA ADD 15 MINS: Performed by: ORTHOPAEDIC SURGERY

## 2019-05-21 PROCEDURE — 64448 NJX AA&/STRD FEM NRV NFS IMG: CPT | Mod: 59,LT,, | Performed by: ANESTHESIOLOGY

## 2019-05-21 PROCEDURE — D9220A PRA ANESTHESIA: ICD-10-PCS | Mod: ,,, | Performed by: ANESTHESIOLOGY

## 2019-05-21 PROCEDURE — 76942 SUPRAINGUINAL FASCIA ILIACA CATHETER: ICD-10-PCS | Mod: 26,,, | Performed by: ANESTHESIOLOGY

## 2019-05-21 PROCEDURE — 27201423 OPTIME MED/SURG SUP & DEVICES STERILE SUPPLY: Performed by: ORTHOPAEDIC SURGERY

## 2019-05-21 PROCEDURE — 71000039 HC RECOVERY, EACH ADD'L HOUR: Performed by: ORTHOPAEDIC SURGERY

## 2019-05-21 PROCEDURE — 36000710: Performed by: ORTHOPAEDIC SURGERY

## 2019-05-21 PROCEDURE — 94761 N-INVAS EAR/PLS OXIMETRY MLT: CPT

## 2019-05-21 PROCEDURE — 11000001 HC ACUTE MED/SURG PRIVATE ROOM

## 2019-05-21 PROCEDURE — 86901 BLOOD TYPING SEROLOGIC RH(D): CPT

## 2019-05-21 PROCEDURE — 36000711: Performed by: ORTHOPAEDIC SURGERY

## 2019-05-21 DEVICE — SCREW TFN ADVANCE 90MM: Type: IMPLANTABLE DEVICE | Site: FEMUR | Status: FUNCTIONAL

## 2019-05-21 DEVICE — IMPLANTABLE DEVICE: Type: IMPLANTABLE DEVICE | Site: FEMUR | Status: FUNCTIONAL

## 2019-05-21 RX ORDER — DEXAMETHASONE SODIUM PHOSPHATE 4 MG/ML
INJECTION, SOLUTION INTRA-ARTICULAR; INTRALESIONAL; INTRAMUSCULAR; INTRAVENOUS; SOFT TISSUE
Status: DISCONTINUED | OUTPATIENT
Start: 2019-05-21 | End: 2019-05-21

## 2019-05-21 RX ORDER — VANCOMYCIN HCL IN 5 % DEXTROSE 1G/250ML
1000 PLASTIC BAG, INJECTION (ML) INTRAVENOUS
Status: COMPLETED | OUTPATIENT
Start: 2019-05-21 | End: 2019-05-21

## 2019-05-21 RX ORDER — CEFAZOLIN SODIUM 1 G/3ML
2 INJECTION, POWDER, FOR SOLUTION INTRAMUSCULAR; INTRAVENOUS
Status: COMPLETED | OUTPATIENT
Start: 2019-05-21 | End: 2019-05-21

## 2019-05-21 RX ORDER — FENTANYL CITRATE 50 UG/ML
25 INJECTION, SOLUTION INTRAMUSCULAR; INTRAVENOUS EVERY 5 MIN PRN
Status: DISCONTINUED | OUTPATIENT
Start: 2019-05-21 | End: 2019-05-21 | Stop reason: HOSPADM

## 2019-05-21 RX ORDER — MORPHINE SULFATE 2 MG/ML
2 INJECTION, SOLUTION INTRAMUSCULAR; INTRAVENOUS
Status: DISCONTINUED | OUTPATIENT
Start: 2019-05-21 | End: 2019-05-21

## 2019-05-21 RX ORDER — DIPHENHYDRAMINE HYDROCHLORIDE 50 MG/ML
25 INJECTION INTRAMUSCULAR; INTRAVENOUS EVERY 4 HOURS PRN
Status: DISCONTINUED | OUTPATIENT
Start: 2019-05-21 | End: 2019-05-23 | Stop reason: HOSPADM

## 2019-05-21 RX ORDER — KETAMINE HCL IN 0.9 % NACL 50 MG/5 ML
SYRINGE (ML) INTRAVENOUS
Status: DISCONTINUED | OUTPATIENT
Start: 2019-05-21 | End: 2019-05-21

## 2019-05-21 RX ORDER — ROPIVACAINE HYDROCHLORIDE 2 MG/ML
2 INJECTION, SOLUTION EPIDURAL; INFILTRATION; PERINEURAL CONTINUOUS
Status: DISCONTINUED | OUTPATIENT
Start: 2019-05-21 | End: 2019-05-23 | Stop reason: HOSPADM

## 2019-05-21 RX ORDER — PREGABALIN 50 MG/1
150 CAPSULE ORAL ONCE
Status: COMPLETED | OUTPATIENT
Start: 2019-05-21 | End: 2019-05-21

## 2019-05-21 RX ORDER — DOCUSATE SODIUM 100 MG/1
100 CAPSULE, LIQUID FILLED ORAL 2 TIMES DAILY PRN
Qty: 60 CAPSULE | Refills: 0 | Status: SHIPPED | OUTPATIENT
Start: 2019-05-21 | End: 2019-07-15

## 2019-05-21 RX ORDER — ACETAMINOPHEN 325 MG/1
650 TABLET ORAL EVERY 8 HOURS PRN
Status: DISCONTINUED | OUTPATIENT
Start: 2019-05-21 | End: 2019-05-21

## 2019-05-21 RX ORDER — PREGABALIN 50 MG/1
150 CAPSULE ORAL NIGHTLY
Status: DISCONTINUED | OUTPATIENT
Start: 2019-05-22 | End: 2019-05-23 | Stop reason: HOSPADM

## 2019-05-21 RX ORDER — CELECOXIB 200 MG/1
200 CAPSULE ORAL DAILY
Status: DISCONTINUED | OUTPATIENT
Start: 2019-05-22 | End: 2019-05-23 | Stop reason: HOSPADM

## 2019-05-21 RX ORDER — RAMELTEON 8 MG/1
8 TABLET ORAL NIGHTLY PRN
Status: DISCONTINUED | OUTPATIENT
Start: 2019-05-21 | End: 2019-05-23 | Stop reason: HOSPADM

## 2019-05-21 RX ORDER — CEFAZOLIN SODIUM 1 G/3ML
2 INJECTION, POWDER, FOR SOLUTION INTRAMUSCULAR; INTRAVENOUS
Status: CANCELLED | OUTPATIENT
Start: 2019-05-21 | End: 2019-05-22

## 2019-05-21 RX ORDER — MUPIROCIN 20 MG/G
OINTMENT TOPICAL
Status: DISCONTINUED | OUTPATIENT
Start: 2019-05-21 | End: 2019-05-21

## 2019-05-21 RX ORDER — ROCURONIUM BROMIDE 10 MG/ML
INJECTION, SOLUTION INTRAVENOUS
Status: DISCONTINUED | OUTPATIENT
Start: 2019-05-21 | End: 2019-05-21

## 2019-05-21 RX ORDER — MIDAZOLAM HYDROCHLORIDE 1 MG/ML
INJECTION, SOLUTION INTRAMUSCULAR; INTRAVENOUS
Status: DISCONTINUED | OUTPATIENT
Start: 2019-05-21 | End: 2019-05-21

## 2019-05-21 RX ORDER — ACETAMINOPHEN 500 MG
1000 TABLET ORAL EVERY 6 HOURS
Status: COMPLETED | OUTPATIENT
Start: 2019-05-21 | End: 2019-05-23

## 2019-05-21 RX ORDER — NEOSTIGMINE METHYLSULFATE 1 MG/ML
INJECTION, SOLUTION INTRAVENOUS
Status: DISCONTINUED | OUTPATIENT
Start: 2019-05-21 | End: 2019-05-21

## 2019-05-21 RX ORDER — GLYCOPYRROLATE 0.2 MG/ML
INJECTION INTRAMUSCULAR; INTRAVENOUS
Status: DISCONTINUED | OUTPATIENT
Start: 2019-05-21 | End: 2019-05-21

## 2019-05-21 RX ORDER — ASPIRIN 81 MG/1
81 TABLET ORAL DAILY
Status: DISCONTINUED | OUTPATIENT
Start: 2019-05-21 | End: 2019-05-23 | Stop reason: HOSPADM

## 2019-05-21 RX ORDER — LIDOCAINE HYDROCHLORIDE 10 MG/ML
1 INJECTION, SOLUTION EPIDURAL; INFILTRATION; INTRACAUDAL; PERINEURAL ONCE
Status: DISCONTINUED | OUTPATIENT
Start: 2019-05-21 | End: 2019-05-23 | Stop reason: HOSPADM

## 2019-05-21 RX ORDER — ACETAMINOPHEN 10 MG/ML
1000 INJECTION, SOLUTION INTRAVENOUS ONCE
Status: COMPLETED | OUTPATIENT
Start: 2019-05-21 | End: 2019-05-21

## 2019-05-21 RX ORDER — MORPHINE SULFATE 2 MG/ML
2 INJECTION, SOLUTION INTRAMUSCULAR; INTRAVENOUS EVERY 4 HOURS PRN
Status: DISCONTINUED | OUTPATIENT
Start: 2019-05-21 | End: 2019-05-22

## 2019-05-21 RX ORDER — SODIUM CHLORIDE 0.9 % (FLUSH) 0.9 %
10 SYRINGE (ML) INJECTION
Status: DISCONTINUED | OUTPATIENT
Start: 2019-05-21 | End: 2019-05-23 | Stop reason: HOSPADM

## 2019-05-21 RX ORDER — FENTANYL CITRATE 50 UG/ML
25 INJECTION, SOLUTION INTRAMUSCULAR; INTRAVENOUS EVERY 5 MIN PRN
Status: DISCONTINUED | OUTPATIENT
Start: 2019-05-21 | End: 2019-05-21

## 2019-05-21 RX ORDER — MIDAZOLAM HYDROCHLORIDE 1 MG/ML
0.5 INJECTION INTRAMUSCULAR; INTRAVENOUS
Status: DISCONTINUED | OUTPATIENT
Start: 2019-05-21 | End: 2019-05-21

## 2019-05-21 RX ORDER — LIDOCAINE HCL/PF 100 MG/5ML
SYRINGE (ML) INTRAVENOUS
Status: DISCONTINUED | OUTPATIENT
Start: 2019-05-21 | End: 2019-05-21

## 2019-05-21 RX ORDER — CELECOXIB 200 MG/1
400 CAPSULE ORAL ONCE
Status: COMPLETED | OUTPATIENT
Start: 2019-05-21 | End: 2019-05-21

## 2019-05-21 RX ORDER — OXYCODONE HYDROCHLORIDE 5 MG/1
10 TABLET ORAL
Status: DISCONTINUED | OUTPATIENT
Start: 2019-05-21 | End: 2019-05-21

## 2019-05-21 RX ORDER — OXYCODONE HYDROCHLORIDE 5 MG/1
5 TABLET ORAL
Status: DISCONTINUED | OUTPATIENT
Start: 2019-05-21 | End: 2019-05-21

## 2019-05-21 RX ORDER — OXYCODONE HYDROCHLORIDE 5 MG/1
15 TABLET ORAL
Status: DISCONTINUED | OUTPATIENT
Start: 2019-05-21 | End: 2019-05-21

## 2019-05-21 RX ORDER — SODIUM CHLORIDE 9 MG/ML
INJECTION, SOLUTION INTRAVENOUS CONTINUOUS
Status: DISCONTINUED | OUTPATIENT
Start: 2019-05-21 | End: 2019-05-21

## 2019-05-21 RX ORDER — VANCOMYCIN HYDROCHLORIDE 1 G/20ML
INJECTION, POWDER, LYOPHILIZED, FOR SOLUTION INTRAVENOUS
Status: DISCONTINUED | OUTPATIENT
Start: 2019-05-21 | End: 2019-05-21 | Stop reason: HOSPADM

## 2019-05-21 RX ORDER — ONDANSETRON 4 MG/1
8 TABLET, FILM COATED ORAL EVERY 8 HOURS PRN
Qty: 20 TABLET | Refills: 0 | Status: SHIPPED | OUTPATIENT
Start: 2019-05-21 | End: 2019-06-04

## 2019-05-21 RX ORDER — FENTANYL CITRATE 50 UG/ML
INJECTION, SOLUTION INTRAMUSCULAR; INTRAVENOUS
Status: DISCONTINUED | OUTPATIENT
Start: 2019-05-21 | End: 2019-05-21

## 2019-05-21 RX ORDER — BUPIVACAINE HYDROCHLORIDE AND EPINEPHRINE 2.5; 5 MG/ML; UG/ML
INJECTION, SOLUTION EPIDURAL; INFILTRATION; INTRACAUDAL; PERINEURAL
Status: COMPLETED | OUTPATIENT
Start: 2019-05-21 | End: 2019-05-21

## 2019-05-21 RX ORDER — ASPIRIN 81 MG/1
81 TABLET ORAL 2 TIMES DAILY
Qty: 60 TABLET | Refills: 0 | Status: SHIPPED | OUTPATIENT
Start: 2019-05-21 | End: 2019-06-20

## 2019-05-21 RX ORDER — ONDANSETRON 8 MG/1
8 TABLET, ORALLY DISINTEGRATING ORAL EVERY 8 HOURS PRN
Status: DISCONTINUED | OUTPATIENT
Start: 2019-05-21 | End: 2019-05-22

## 2019-05-21 RX ORDER — METHOCARBAMOL 750 MG/1
750 TABLET, FILM COATED ORAL 4 TIMES DAILY PRN
Status: DISCONTINUED | OUTPATIENT
Start: 2019-05-21 | End: 2019-05-22

## 2019-05-21 RX ORDER — PROPOFOL 10 MG/ML
VIAL (ML) INTRAVENOUS
Status: DISCONTINUED | OUTPATIENT
Start: 2019-05-21 | End: 2019-05-21

## 2019-05-21 RX ORDER — OXYCODONE AND ACETAMINOPHEN 5; 325 MG/1; MG/1
1-2 TABLET ORAL EVERY 6 HOURS PRN
Qty: 60 TABLET | Refills: 0 | Status: SHIPPED | OUTPATIENT
Start: 2019-05-21 | End: 2019-07-08

## 2019-05-21 RX ORDER — OXYCODONE HYDROCHLORIDE 10 MG/1
10 TABLET ORAL EVERY 4 HOURS PRN
Status: DISCONTINUED | OUTPATIENT
Start: 2019-05-21 | End: 2019-05-23 | Stop reason: HOSPADM

## 2019-05-21 RX ADMIN — FENTANYL CITRATE 25 MCG: 50 INJECTION INTRAMUSCULAR; INTRAVENOUS at 12:05

## 2019-05-21 RX ADMIN — Medication 30 MG: at 11:05

## 2019-05-21 RX ADMIN — OXYCODONE HYDROCHLORIDE 10 MG: 10 TABLET ORAL at 06:05

## 2019-05-21 RX ADMIN — ROPIVACAINE HYDROCHLORIDE 2 ML/HR: 2 INJECTION, SOLUTION EPIDURAL; INFILTRATION at 12:05

## 2019-05-21 RX ADMIN — GLYCOPYRROLATE 0.6 MG: 0.2 INJECTION, SOLUTION INTRAMUSCULAR; INTRAVENOUS at 12:05

## 2019-05-21 RX ADMIN — CEFAZOLIN 2 G: 330 INJECTION, POWDER, FOR SOLUTION INTRAMUSCULAR; INTRAVENOUS at 10:05

## 2019-05-21 RX ADMIN — MIDAZOLAM HYDROCHLORIDE 1 MG: 1 INJECTION, SOLUTION INTRAMUSCULAR; INTRAVENOUS at 10:05

## 2019-05-21 RX ADMIN — FENTANYL CITRATE 25 MCG: 50 INJECTION, SOLUTION INTRAMUSCULAR; INTRAVENOUS at 12:05

## 2019-05-21 RX ADMIN — ROCURONIUM BROMIDE 10 MG: 10 INJECTION, SOLUTION INTRAVENOUS at 11:05

## 2019-05-21 RX ADMIN — DEXAMETHASONE SODIUM PHOSPHATE 8 MG: 4 INJECTION, SOLUTION INTRAMUSCULAR; INTRAVENOUS at 10:05

## 2019-05-21 RX ADMIN — MORPHINE SULFATE 2 MG: 2 INJECTION, SOLUTION INTRAMUSCULAR; INTRAVENOUS at 01:05

## 2019-05-21 RX ADMIN — ACETAMINOPHEN 1000 MG: 500 TABLET ORAL at 11:05

## 2019-05-21 RX ADMIN — SODIUM CHLORIDE: 0.9 INJECTION, SOLUTION INTRAVENOUS at 10:05

## 2019-05-21 RX ADMIN — ROPIVACAINE HYDROCHLORIDE 2 ML/HR: 2 INJECTION, SOLUTION EPIDURAL; INFILTRATION at 08:05

## 2019-05-21 RX ADMIN — VANCOMYCIN HYDROCHLORIDE 1000 MG: 1 INJECTION, POWDER, LYOPHILIZED, FOR SOLUTION INTRAVENOUS at 08:05

## 2019-05-21 RX ADMIN — LIDOCAINE HYDROCHLORIDE 100 MG: 20 INJECTION, SOLUTION INTRAVENOUS at 10:05

## 2019-05-21 RX ADMIN — BUPIVACAINE HYDROCHLORIDE AND EPINEPHRINE BITARTRATE 40 ML: 2.5; .0091 INJECTION, SOLUTION EPIDURAL; INFILTRATION; INTRACAUDAL; PERINEURAL at 09:05

## 2019-05-21 RX ADMIN — ACETAMINOPHEN 1000 MG: 500 TABLET ORAL at 05:05

## 2019-05-21 RX ADMIN — MORPHINE SULFATE 2 MG: 2 INJECTION, SOLUTION INTRAMUSCULAR; INTRAVENOUS at 02:05

## 2019-05-21 RX ADMIN — SODIUM CHLORIDE: 0.9 INJECTION, SOLUTION INTRAVENOUS at 08:05

## 2019-05-21 RX ADMIN — PROMETHAZINE HYDROCHLORIDE 6.25 MG: 25 INJECTION INTRAMUSCULAR; INTRAVENOUS at 01:05

## 2019-05-21 RX ADMIN — SODIUM CHLORIDE, SODIUM GLUCONATE, SODIUM ACETATE, POTASSIUM CHLORIDE, MAGNESIUM CHLORIDE, SODIUM PHOSPHATE, DIBASIC, AND POTASSIUM PHOSPHATE: .53; .5; .37; .037; .03; .012; .00082 INJECTION, SOLUTION INTRAVENOUS at 11:05

## 2019-05-21 RX ADMIN — METHOCARBAMOL TABLETS 750 MG: 750 TABLET, COATED ORAL at 04:05

## 2019-05-21 RX ADMIN — ACETAMINOPHEN 1000 MG: 10 INJECTION, SOLUTION INTRAVENOUS at 12:05

## 2019-05-21 RX ADMIN — MIDAZOLAM HYDROCHLORIDE 1 MG: 2 INJECTION, SOLUTION INTRAMUSCULAR; INTRAVENOUS at 09:05

## 2019-05-21 RX ADMIN — PROMETHAZINE HYDROCHLORIDE 6.25 MG: 25 INJECTION INTRAMUSCULAR; INTRAVENOUS at 08:05

## 2019-05-21 RX ADMIN — MORPHINE SULFATE 2 MG: 2 INJECTION, SOLUTION INTRAMUSCULAR; INTRAVENOUS at 12:05

## 2019-05-21 RX ADMIN — PREGABALIN 150 MG: 50 CAPSULE ORAL at 03:05

## 2019-05-21 RX ADMIN — FENTANYL CITRATE 50 MCG: 50 INJECTION, SOLUTION INTRAMUSCULAR; INTRAVENOUS at 09:05

## 2019-05-21 RX ADMIN — ASPIRIN 81 MG: 81 TABLET, COATED ORAL at 04:05

## 2019-05-21 RX ADMIN — FENTANYL CITRATE 50 MCG: 50 INJECTION, SOLUTION INTRAMUSCULAR; INTRAVENOUS at 10:05

## 2019-05-21 RX ADMIN — CELECOXIB 400 MG: 200 CAPSULE ORAL at 12:05

## 2019-05-21 RX ADMIN — MUPIROCIN: 20 OINTMENT TOPICAL at 08:05

## 2019-05-21 RX ADMIN — OXYCODONE HYDROCHLORIDE 10 MG: 5 TABLET ORAL at 12:05

## 2019-05-21 RX ADMIN — ROCURONIUM BROMIDE 20 MG: 10 INJECTION, SOLUTION INTRAVENOUS at 10:05

## 2019-05-21 RX ADMIN — MORPHINE SULFATE 2 MG: 2 INJECTION, SOLUTION INTRAMUSCULAR; INTRAVENOUS at 03:05

## 2019-05-21 RX ADMIN — NEOSTIGMINE METHYLSULFATE 5 MG: 1 INJECTION INTRAVENOUS at 12:05

## 2019-05-21 RX ADMIN — ROCURONIUM BROMIDE 30 MG: 10 INJECTION, SOLUTION INTRAVENOUS at 10:05

## 2019-05-21 RX ADMIN — PROPOFOL 150 MG: 10 INJECTION, EMULSION INTRAVENOUS at 10:05

## 2019-05-21 NOTE — PLAN OF CARE
[facility]    HOME HEALTH ORDERS  FACE TO FACE ENCOUNTER    Patient Name: Joyce Peterson  YOB: 1960    PCP: Becca Peters DO   PCP Address: [address] [place] [address] / [place] [address]  PCP Phone Number: [phone]  PCP Fax: [phone]    Encounter Date: 05/21/2019    Admit to Home Health    Diagnoses:  Active Hospital Problems    Diagnosis  POA    *Stress fracture of neck of left femur [M84.352A]  Yes      Resolved Hospital Problems   No resolved problems to display.       Future Appointments   Date Time Provider Department Center   6/4/2019  9:00 AM LAB, STEPHANY [place] [facility]   6/6/2019  3:00 PM Weston Jacob MD [facility] CARDIO [facility]   6/27/2019  9:30 AM LAB, STEPHANYSOTO MARTINESH [facility]   7/11/2019 10:00 AM LAB, STEPHANY K[facility]H [facility]   7/16/2019 10:00 AM Isiah Moran MD [facility] RHEUM [facility]   10/1/2019 10:15 AM EPO, INJECTION [facility] ID INF [facility]           I have seen and examined this patient face to face today. My clinical findings that support the need for the home health skilled services and home bound status are the following:  Weakness/numbness causing balance and gait disturbance due to Joint Replacement and Fracture making it taxing to leave home.    Allergies:  Review of patient's allergies indicates:   Allergen Reactions    Actemra [tocilizumab] Other (See Comments)     Severe dizziness    Codeine Nausea And Vomiting    Gold au 198 Hives and Rash    Hydroxychloroquine Other (See Comments)     Can't remember the reaction      Iodinated contrast- oral and iv dye Other (See Comments)     Other reaction(s): BURNING ALL OVER    Iodine Other (See Comments)     Other reaction(s): BURNING ALL OVER - Iodine dye - Not topical    Sulfa (sulfonamide antibiotics) Other (See Comments)     Can't remember the reaction    Zofran [ondansetron hcl (pf)] Nausea And Vomiting     Pt reports that last time she received zofran she started vomiting  "again    Methotrexate analogues Nausea Only    Pneumovax 23 [pneumococcal 23-argenis ps vaccine] Other (See Comments)     "sick"       Diet: regular diet    Activities: activity as tolerated    Home Health Admitting Clinician:   SN/PT to complete comprehensive assessment including routine vital signs. Instruct on disease process and s/s of complications to report to MD. Follow specific home health arthoplasty protocol. Review/verify medication list sent home with the patient at time of discharge  and instruct patient/caregiver as needed. If coumadin ordered, coumadin clinic to manage INR with INR draws 2x per week with a goal to maintain INR between 1.8 and 2.2. Frequency may be adjusted depending on start of care date.    Notify MD if SBP > 160 or < 90; DBP > 90 or < 50; HR > 120 or < 50; Temp > 101    Home Medical Equipment:  Walker, 3-1 bedside commode, transfer tub bench    CONSULTS:    Physical Therapy may admit if patient not on coumadin, PT to perform comprehensive assessment if performing admit visit and generate therapy plan of care. Evaluate for home safety and equipment needs; Establish/upgrade home exercise program. Perform/instruct on therapeutic exercises, gait training, transfer training, and Range of Motion.      OTHER: (only select if patient needs other therapy disciplines)  Occupational Therapy to evaluate and treat. Evaluate home environment for safety and equipment needs. Perform/Instruct on transfers, ADL training, ROM, and therapeutic exercises.   to evaluate for community resources/long-range planning.  Aide to provide assistance with personal care, ADLs, and vital signs.    MISCELLANEOUS CARE:  Routine Skin for Bedridden Patients: Instruct patient/caregiver to apply moisture barrier cream to all skin folds and wet areas in perineal area daily and after baths and all bowel movements.    WOUND CARE ORDERS:  Assess Surgical Incision/DSRG each TX  Aquacel AG drsg applied post-op leave " on 14 days post op. Call MD if any drainage reaches border to border of drsg horizontally, s/s of infection, temp >101, induration, swelling or redness.  If dressing is removed per MD order, then apply island dressing, change/teach caregiver to perform daily dressing change if island dressing present.    Medications: Review discharge medications with patient and family and provide education.      Current Discharge Medication List      CONTINUE these medications which have NOT CHANGED    Details   azelastine (ASTELIN) 137 mcg nasal spray 1 spray (137 mcg total) by Nasal route 2 (two) times daily.  Qty: 30 mL, Refills: 11    Associated Diagnoses: Nasal congestion      budesonide-formoterol 160-4.5 mcg (SYMBICORT) 160-4.5 mcg/actuation HFAA Inhale 2 puffs into the lungs every 12 (twelve) hours.  Qty: 3 Inhaler, Refills: 3    Associated Diagnoses: Chronic asthma, mild intermittent, uncomplicated      calcium citrate-vitamin D (CITRACAL + D) 315-200 mg-unit per tablet Take 1 tablet by mouth 2 (two) times daily.       fluconazole (DIFLUCAN) 100 MG tablet Take 100 mg by mouth once daily.  Refills: 3      fluticasone (FLONASE) 50 mcg/actuation nasal spray       INV PROPULSID 10 MG Take 20 mg by mouth 4 (four) times daily. FOR INVESTIGATIONAL USE ONLY. Protocol [id number]  Qty: 1320 each, Refills: 0    Associated Diagnoses: Patient in clinical research study      leflunomide (ARAVA) 20 MG Tab Take 1 tablet (20 mg total) by mouth once daily.  Qty: 30 tablet, Refills: 1    Associated Diagnoses: Rheumatoid arthritis, involving unspecified site, unspecified rheumatoid factor presence      montelukast (SINGULAIR) 10 mg tablet Take 1 tablet (10 mg total) by mouth nightly.  Qty: 90 tablet, Refills: 3    Associated Diagnoses: Perennial allergic rhinitis      naproxen (NAPROSYN) 500 MG tablet TAKE 1 TABLET TWICE A DAY AS NEEDED  Qty: 180 tablet, Refills: 1      omeprazole (PRILOSEC) 40 MG capsule TAKE ONE CAPSULE BY MOUTH EVERY  MORNING  Qty: 30 capsule, Refills: 6      omeprazole-sodium bicarbonate (ZEGERID) 40-1.1 mg-gram per capsule TAKE 1 CAPSULE BY MOUTH EVERY MORNING.  Qty: 90 capsule, Refills: 3      phenazopyridine (PYRIDIUM) 200 MG tablet Take 1 tablet by mouth four times daily as needed for urinary pain.  Qty: 16 tablet, Refills: 2      POTASSIUM ORAL Take by mouth once daily.      !! predniSONE (DELTASONE) 5 MG tablet Take 1 tablet (5 mg total) by mouth once daily.  Qty: 90 tablet, Refills: 1      PREMARIN 0.625 mg tablet Take 0.625 mg by mouth once daily.  Refills: 4      progesterone (PROMETRIUM) 100 MG capsule Take 100 mg by mouth every morning. 1 Capsule Oral Every day, morning      PROVENTIL HFA 90 mcg/actuation inhaler INHALE 2 PUFFS EVERY 6 HOURS AS NEEDED  Qty: 20.1 Inhaler, Refills: 0      RESTASIS 0.05 % ophthalmic emulsion PLACE 0.4 MLS (1 DROP TOTAL) INTO BOTH EYES 2 (TWO) TIMES DAILY.  Refills: 5      rosuvastatin (CRESTOR) 20 MG tablet Take 1 tablet (20 mg total) by mouth every evening.  Qty: 90 tablet, Refills: 3    Associated Diagnoses: Coronary artery disease involving native coronary artery of native heart without angina pectoris      sarilumab (KEVZARA) 200 mg/1.14 mL Syrg Inject 1.14 mLs (200 mg total) into the skin every 14 (fourteen) days.  Qty: 2.28 mL, Refills: 2      sucralfate (CARAFATE) 1 gram tablet TAKE 1 TABLET (1 G TOTAL) BY MOUTH 4 (FOUR) TIMES DAILY.  Qty: 360 tablet, Refills: 3    Associated Diagnoses: Gastroparesis      aspirin 81 mg Tab Take 81 mg by mouth every morning.       diclofenac sodium (VOLTAREN) 1 % Gel Apply 2 g topically 4 (four) times daily as needed.  Qty: 500 g, Refills: 5    Comments: 5 x 100g tubes  Associated Diagnoses: Rheumatoid arthritis involving multiple sites, unspecified rheumatoid factor presence; Bunion; Tailor's bunion of both feet      docusate sodium (COLACE) 100 MG capsule Take 1 capsule (100 mg total) by mouth 3 (three) times daily.  Qty: 90 capsule, Refills: 0  "     flu vac [id number] 36mos up,PF, 60 mcg (15 mcg x 4)/0.5 mL Syrg To be administered by Pharm D  Qty: 0.5 mL, Refills: 0      GUAIFENESIN (MUCINEX ORAL) Take 400 mg by mouth every 4 (four) hours as needed.       !! predniSONE (DELTASONE) 1 MG tablet Take 1 tablet (1 mg total) by mouth once daily.  Qty: 90 tablet, Refills: 1      rizatriptan (MAXALT) 10 MG tablet Take 10 mg by mouth as needed for Migraine.       syringe with needle (TUBERCULIN SYRINGE) 1 mL 27 x 1/2" Syrg To administer methotrexate 7.5mg sc once a week  Qty: 12 Syringe, Refills: 3    Associated Diagnoses: Rheumatoid arthritis involving multiple sites, unspecified rheumatoid factor presence      traMADol (ULTRAM) 50 mg tablet Take 1 tablet (50 mg total) by mouth every 24 hours as needed for Pain.  Qty: 30 tablet, Refills: 0      valACYclovir (VALTREX) 1000 MG tablet TAKE 1 TABLET BY MOUTH TWICE A DAY AS NEEDED  Qty: 20 tablet, Refills: 3    Associated Diagnoses: Recurrent oral herpes simplex      varicella-zoster [id number], PF, (SHINGRIX, PF,) 50 mcg/0.5 mL injection Inject into the muscle.  Qty: 0.5 mL, Refills: 0       !! - Potential duplicate medications found. Please discuss with provider.          I certify that this patient is confined to her home and needs intermittent skilled nursing care, physical therapy and occupational therapy.  "

## 2019-05-21 NOTE — OP NOTE
OPERATIVE NOTE    DOS:  05/21/2019    Preop Dx: Left intertrochanteric femur fracture    Postop Dx: Left intertrochanteric femur fracture    Procedure: Cephalomedullary nail fixation of left intertrochanteric femur fracture - 53486    Surgeon: López Duff M.D.    Asst:  Ricardo Amezquita M.D    Anesthesia: GETA    EBL:  100cc    IVF:  1500cc crystalloid    Implants:   Implant Name Type Inv. Item Serial No.  Lot No. LRB No. Used   WIRE GUIDE 3.2MM 400MM - [id number]  WIRE GUIDE 3.2MM 400MM  [employer]  Left 2   [id number] TI REYMUNDO TFNA  Nail   [employer] [id number] Left 1   SCREW TFN ADVANCE 90MM - [id number]  SCREW TFN ADVANCE 90MM  [employer] [id number] Left 1         Specimens: None    Findings: Stable fixation    Dispo:  To PACU extubated/stable      Indications for Procedure:    The patient is a 57 yo F with RA on chronic steroids with a 5 month history of left hip pain, worsening in the last several weeks.  She had an MRI which showed no evidence of AVN, but did show a stress fracture from the transcervical superior femoral neck, through the bascicervical region inferiorly, extending into the IT region on both T2 and T1.  She takes Kevzara, but hasn't had a dose in 9 days. The risks, benefits and alternatives to surgery were discussed with the patient and family at length prior to going to the operating room.  Informed consent was obtained for fixation.    Procedure in Detail:    The patient was identified in the preoperative holding area and the site was marked.  Regional analgesia was performed in the form of super inguinal fascia iliaca catheter.  Patient was wheeled into the operating room and general endotracheal anesthesia was induced on the patient's hospital bed.  Preoperative antibiotics were administered.  The patient was placed onto the [place] fracture table with all bony prominences well padded and both lower extremities in traction boots.  A time-out was undertaken to confirm patient, side, site, surgery,  surgeon and the administration of preoperative antibiotics.  All agreed and we proceeded.    Closed reduction maneuvers were performed on the table gaining good alignment.  The left hip and lower extremity were prepped and draped in sterile fashion.  A starting incision was made proximal to the greater trochanter and the guidewire for the entry reamer was placed in the appropriate position.  Entry reamer was then used.  A guide joe was passed distally and measurement undertaken.  The canal was then reamed with a 12-1/2 mm reamer through the isthmus.  A [id number] nail was placed in direct distal lateral visualization to ensure good central position in the canal.    Attention was then turned proximally to the hip and the nail seated at its final position.  A guidewire for the hip screw was then placed in a center center position in the femoral head under fluoroscopic guidance.  This was then removed and measured and a 90mm hip screw was placed.  Final position was confirmed under fluoroscopy and insertion handle was removed.  This is a stable stress fracture and does not require distal interlocking.    The wounds were then copiously irrigated with normal saline solution.  Given her higher infection risk, 1g of Vancomycin powder was placed spit between both wounds. The proximal wound closed with 0 Vicryl suture in subcutaneous tissue and all was closed with 3-0 Vicryl suture followed by 3-0 nylon suture in the skin.  Sterile dressings were applied.    All instrument and sponge counts were reported correct at the end of the case.  There were no complications.  The patient was returned to the hospital bed, extubated, awakened and taken to the recovery room in stable condition.    Plan for the Patient:    Immediate physical and occupational therapy with WBAT and gait training.    López Duff MD

## 2019-05-21 NOTE — NURSING
Called anesthesia to request order for PRN pain medication. Rozina requested that I call on call for Ortho, due to an emergency situation.      Spoke to Dr. Amezquita (on call for Sherin) requested PRN pain medication order be entered.     Requested that pt's home medication be ordered. As per Dr. Amezquita, pt may take personal home medications.

## 2019-05-21 NOTE — NURSING TRANSFER
Nursing Transfer Note      5/21/2019     Transfer To: 509    Transfer via stretcher    Transfer with room air    Transported by [address]    Medicines sent: no    Chart send with patient: Yes    Notified: spouse

## 2019-05-21 NOTE — ANESTHESIA PROCEDURE NOTES
Suprainguinal Fascia Iliaca Catheter    Patient location during procedure: pre-op   Block not for primary anesthetic.  Reason for block: at surgeon's request and post-op pain management   Post-op Pain Location: L hip pain  Start time: 5/21/2019 9:20 AM  Timeout: 5/21/2019 9:20 AM   End time: 5/21/2019 9:35 AM  Staffing  Anesthesiologist: J Luis Lock MD  Other anesthesia staff: Weston Felton MD  Performed: other anesthesia staff   Preanesthetic Checklist  Completed: patient identified, site marked, surgical consent, pre-op evaluation, timeout performed, IV checked, risks and benefits discussed and monitors and equipment checked  Peripheral Block  Patient position: supine  Prep: ChloraPrep and site prepped and draped  Patient monitoring: heart rate, cardiac monitor, continuous pulse ox, continuous capnometry and frequent blood pressure checks  Block type: fascia iliaca (Suprainguinal fascia iliaca)  Laterality: left  Injection technique: continuous  Needle  Needle type: Tuohy   Needle gauge: 17 G  Needle length: 3.5 in  Needle localization: anatomical landmarks and ultrasound guidance  Catheter type: spring wound  Catheter size: 19 G  Test dose: lidocaine 1.5% with Epi 1-to-200,000 and negative   -ultrasound image captured on disc.  Assessment  Injection assessment: negative aspiration, negative parasthesia and local visualized surrounding nerve  Paresthesia pain: none  Heart rate change: no  Slow fractionated injection: yes  Additional Notes  VSS.  DOS[facility] RN monitoring vitals throughout procedure.  Patient tolerated procedure well.

## 2019-05-21 NOTE — TRANSFER OF CARE
"Anesthesia Transfer of Care Note    Patient: Joyce Peterson    Procedure(s) Performed: Procedure(s) (LRB):  INSERTION, INTRAMEDULLARY ARIEL, FEMUR (Left)    Patient location: PACU    Anesthesia Type: general    Transport from OR: Transported from OR on 6-10 L/min O2 by face mask with adequate spontaneous ventilation    Post pain: adequate analgesia    Post assessment: no apparent anesthetic complications    Post vital signs: stable    Level of consciousness: awake    Nausea/Vomiting: no nausea/vomiting    Complications: none    Transfer of care protocol was followed      Last vitals:   Visit Vitals  BP (!) 152/79   Pulse 109   Temp 37.2 °C (98.9 °F) (Oral)   Resp 16   Ht 5' 1" (1.549 m)   Wt 69.9 kg (154 lb)   LMP  (LMP Unknown)   SpO2 100%   Breastfeeding? No   BMI 29.10 kg/m²     "

## 2019-05-21 NOTE — PROGRESS NOTES
Ortho Post-Op Progress Note:    Patient seen and examined at bedside. She is POD0 from left CMN. She is comfortable and reports minimal pain. Vital signs are stable. Dressings are c/d/i with aquacel in place.    RLE: 5/5 strength with ankle DF/PF and great toe flexion/extension. Sensation to light touch intact throughout lower extremity. Palpable DP pulse.    LLE: 5/5 strength with ankle DF/PF and great toe flexion/extension. Sensation to light touch intact throughout lower extremity. Palpable DP pulse.    Plan:  -- Continue with current pain control regimen  -- PT/OT. WBAT  -- DVT prophylaxis: ASA 81 BID, FCD's to be worn at all times    -- Continue to monitor; will re-assess in AM    Raul Amezquita M.D. PGY2  Orthopaedic Surgery    Attg Note:  I agree with the resident's assessment and plan.  Mobilize with PT/OT.  López Duff MD

## 2019-05-21 NOTE — ANESTHESIA PREPROCEDURE EVALUATION
[facility]  Anesthesia Pre-Operative Evaluation         Patient Name: Joyce Peterson  YOB: 1960  MRN: [id number]    SUBJECTIVE:     Pre-operative evaluation for Procedure(s) (LRB):  INSERTION, INTRAMEDULLARY ARIEL, FEMUR - left basicervical/IT stress fracture (Left)     05/21/2019    Joyce Peterson is a 58 y.o. female w/ a significant PMHx of rheumatoid arthritis (chronic prednisone use, currently 5mg daily), hx of stress fractures x9, osteopenia, gastroparesis, GERD, asthma, left femoral neck and intertrochanteric stress fracture.     Patient now presents for the above procedure(s).    Prev airway: Present Prior to Hospital Arrival?: No; Placement Date: 10/17/18; Placement Time: 1023; Method of Intubation: Direct laryngoscopy; Inserted by: CRNA (Beth); Airway Device: Endotracheal Tube; Mask Ventilation: Easy; Intubated: Postinduction; Blade: Corea #2; Airway Device Size: 7.0; Style: Cuffed; Cuff Inflation: Minimal occlusive pressure; Inflation Amount: 5; Placement Verified By: Auscultation, Capnometry, ETT Condensation; Grade: Grade I; Complicating Factors: None; Intubation Findings: Positive EtCO2, Bilateral breath sounds, Atraumatic/Condition of teeth unchanged;  Depth of Insertion: 21; Securment: Lips; Complications: None; Breath Sounds: Equal Bilateral; Insertion Attempts: 1; Removal Date: 10/17/18;  Removal Time: 1241        Patient Active Problem List   Diagnosis    Osteoarthritis    GERD (gastroesophageal reflux disease)    Gastroparesis    Osteopenia    Coronary artery disease    Lumbar spondylosis    CS (cervical spondylosis)    Thyroid nodule    Hyperlipidemia    Uncomplicated asthma    AR (allergic rhinitis)    Immunosuppressed status    Rheumatoid arthritis involving multiple sites    Carpal tunnel syndrome of right wrist    Elevated parathyroid hormone    Hypogammaglobulinemia    Right carpal tunnel syndrome    Pain in right hand    Pain in  "right elbow    Decreased  strength of right hand    Right shoulder pain    Iron deficiency anemia due to chronic blood loss    Pure hypercholesterolemia    Research study patient    Sjogren's syndrome    Esophageal candidiasis    Rupture of left triceps tendon    Left hip pain    Acute hip pain, left    Gait abnormality    Trochanteric bursitis of left hip    Stress fracture of neck of left femur    Stress fracture of neck of femur with delayed healing       Review of patient's allergies indicates:   Allergen Reactions    Actemra [tocilizumab] Other (See Comments)     Severe dizziness    Codeine Nausea And Vomiting    Gold au 198 Hives and Rash    Hydroxychloroquine Other (See Comments)     Can't remember the reaction      Iodinated contrast- oral and iv dye Other (See Comments)     Other reaction(s): BURNING ALL OVER    Iodine Other (See Comments)     Other reaction(s): BURNING ALL OVER - Iodine dye - Not topical    Sulfa (sulfonamide antibiotics) Other (See Comments)     Can't remember the reaction    Zofran [ondansetron hcl (pf)] Nausea And Vomiting     Pt reports that last time she received zofran she started vomiting again    Methotrexate analogues Nausea Only    Pneumovax 23 [pneumococcal 23-argenis ps vaccine] Other (See Comments)     "sick"       Current Inpatient Medications:      Current Facility-Administered Medications on File Prior to Encounter   Medication Dose Route Frequency Provider Last Rate Last Dose    0.9%  NaCl infusion   Intravenous Continuous Callum Singer MD   Stopped at 10/17/18 1058     Current Outpatient Medications on File Prior to Encounter   Medication Sig Dispense Refill    aspirin 81 mg Tab Take 81 mg by mouth every morning.       azelastine (ASTELIN) 137 mcg nasal spray 1 spray (137 mcg total) by Nasal route 2 (two) times daily. (Patient taking differently: 1 spray by Nasal route 2 (two) times daily as needed. ) 30 mL 11    budesonide-formoterol " 160-4.5 mcg (SYMBICORT) 160-4.5 mcg/actuation HFAA Inhale 2 puffs into the lungs every 12 (twelve) hours. 3 Inhaler 3    calcium citrate-vitamin D (CITRACAL + D) 315-200 mg-unit per tablet Take 1 tablet by mouth 2 (two) times daily.       diclofenac sodium (VOLTAREN) 1 % Gel Apply 2 g topically 4 (four) times daily as needed. 500 g 5    docusate sodium (COLACE) 100 MG capsule Take 1 capsule (100 mg total) by mouth 3 (three) times daily. 90 capsule 0    flu vac [id number] 36mos up,PF, 60 mcg (15 mcg x 4)/0.5 mL Syrg To be administered by Pharm D 0.5 mL 0    fluconazole (DIFLUCAN) 100 MG tablet Take 100 mg by mouth once daily.  3    fluticasone (FLONASE) 50 mcg/actuation nasal spray       GUAIFENESIN (MUCINEX ORAL) Take 400 mg by mouth every 4 (four) hours as needed.       INV PROPULSID 10 MG Take 20 mg by mouth 4 (four) times daily. FOR INVESTIGATIONAL USE ONLY. Protocol [id number] each 0    leflunomide (ARAVA) 20 MG Tab Take 1 tablet (20 mg total) by mouth once daily. 30 tablet 1    montelukast (SINGULAIR) 10 mg tablet Take 1 tablet (10 mg total) by mouth nightly. 90 tablet 3    naproxen (NAPROSYN) 500 MG tablet TAKE 1 TABLET TWICE A DAY AS NEEDED 180 tablet 1    omeprazole (PRILOSEC) 40 MG capsule TAKE ONE CAPSULE BY MOUTH EVERY MORNING 30 capsule 6    omeprazole-sodium bicarbonate (ZEGERID) 40-1.1 mg-gram per capsule TAKE 1 CAPSULE BY MOUTH EVERY MORNING. 90 capsule 3    phenazopyridine (PYRIDIUM) 200 MG tablet Take 1 tablet by mouth four times daily as needed for urinary pain. 16 tablet 2    POTASSIUM ORAL Take by mouth once daily.      predniSONE (DELTASONE) 1 MG tablet Take 1 tablet (1 mg total) by mouth once daily. 90 tablet 1    predniSONE (DELTASONE) 5 MG tablet Take 1 tablet (5 mg total) by mouth once daily. 90 tablet 1    PREMARIN 0.625 mg tablet Take 0.625 mg by mouth once daily.  4    progesterone (PROMETRIUM) 100 MG capsule Take 100 mg by mouth every morning. 1 Capsule Oral Every  "day, morning      PROVENTIL HFA 90 mcg/actuation inhaler INHALE 2 PUFFS EVERY 6 HOURS AS NEEDED 20.1 Inhaler 0    RESTASIS 0.05 % ophthalmic emulsion PLACE 0.4 MLS (1 DROP TOTAL) INTO BOTH EYES 2 (TWO) TIMES DAILY.  5    rizatriptan (MAXALT) 10 MG tablet Take 10 mg by mouth as needed for Migraine.       rosuvastatin (CRESTOR) 20 MG tablet Take 1 tablet (20 mg total) by mouth every evening. 90 tablet 3    sarilumab (KEVZARA) 200 mg/1.14 mL Syrg Inject 1.14 mLs (200 mg total) into the skin every 14 (fourteen) days. 2.28 mL 2    sucralfate (CARAFATE) 1 gram tablet TAKE 1 TABLET (1 G TOTAL) BY MOUTH 4 (FOUR) TIMES DAILY. 360 tablet 3    syringe with needle (TUBERCULIN SYRINGE) 1 mL 27 x 1/2" Syrg To administer methotrexate 7.5mg sc once a week 12 Syringe 3    traMADol (ULTRAM) 50 mg tablet Take 1 tablet (50 mg total) by mouth every 24 hours as needed for Pain. 30 tablet 0    valACYclovir (VALTREX) 1000 MG tablet TAKE 1 TABLET BY MOUTH TWICE A DAY AS NEEDED 20 tablet 3    varicella-zoster gE-AS01B, PF, (SHINGRIX, PF,) 50 mcg/0.5 mL injection Inject into the muscle. 0.5 mL 0       Past Surgical History:   Procedure Laterality Date    ARTHROSCOPY-KNEE Right 4/6/2016    [facility] 2ND FLR    BREAST BIOPSY Left 01/29/2002    core bx    CARPAL TUNNEL RELEASE Right 05/2017    CHOLECYSTECTOMY  2004    COLONOSCOPY      10/11    COLONOSCOPY N/A 6/29/2017    Performed by López Moore MD at [facility] ENDO (4TH FLR)    ESOPHAGOGASTRODUODENOSCOPY (EGD) N/A 6/29/2017    Performed by L[facility] Moore [facility] at [facility] ENDO (4TH FLR)    RELEASE-CARPAL TUNNEL / Right Right 5/29/2017    [facility]    REPAIR, TENDON, TRICEPS left elbow Left 10/17/2018    Performed [facility] at [facility] OR 1ST FLR    REPAIR-HERNIA-UMBILICAL N/A 8/13/2015    [facility]    TONSILLECTOMY      TUBAL LIGATION  2003    " UPPER GASTROINTESTINAL ENDOSCOPY      10/11    uterine ablation  2003       Social History     Socioeconomic History    Marital status:      Spouse name: Not on file    Number of children: Not on file    Years of education: Not on file    Highest education level: Not on file   Occupational History    Not on file   Social Needs    Financial resource strain: Not on file    Food insecurity:     Worry: Not on file     Inability: Not on file    Transportation needs:     Medical: Not on file     Non-medical: Not on file   Tobacco Use    Smoking status: Never Smoker    Smokeless tobacco: Never Used   Substance and Sexual Activity    Alcohol use: Yes     Comment: occasionally    Drug use: No    Sexual activity: Yes     Partners: Male     Birth control/protection: Surgical   Lifestyle    Physical activity:     Days per week: Not on file     Minutes per session: Not on file    Stress: Not on file   Relationships    Social connections:     Talks on phone: Not on file     Gets together: Not on file     Attends Alevism service: Not on file     Active member of club or organization: Not on file     Attends meetings of clubs or organizations: Not on file     Relationship status: Not on file   Other Topics Concern    Are you pregnant or think you may be? Not Asked    Breast-feeding Not Asked   Social History Narrative    Not on file       OBJECTIVE:     Vital Signs Range (Last 24H):  Pulse:  []   BP: (122-127)/(81-82)       Significant Labs:  Lab Results   Component Value Date    WBC 8.82 05/13/2019    HGB 14.1 05/13/2019    HCT 44.8 05/13/2019     05/13/2019    CHOL 209 (H) 08/07/2018    TRIG 139 08/07/2018    HDL 98 (H) 08/07/2018    ALT 18 05/13/2019    AST 17 05/13/2019     05/13/2019    K 4.6 05/13/2019     05/13/2019    CREATININE 0.9 05/13/2019    BUN 17 05/13/2019    CO2 25 05/13/2019    TSH 0.424 02/28/2019    INR 1.0 12/16/2015    GLUF 78 02/06/2017    HGBA1C 6.0  02/06/2017       Diagnostic Studies: No relevant studies.    EKG:   Vent. Rate : 091 BPM     Atrial Rate : 091 BPM     P-R Int : 140 ms          QRS Dur : 072 ms      QT Int : 380 ms       P-R-T Axes : 027 030 045 degrees     QTc Int : 467 ms    Normal sinus rhythm  Normal ECG  When compared with ECG of 15-OCT-2018 10:29,  No significant change was found  Confirmed by Merary Bucio MD ([id number]) on 4/17/2019 5:26:25 PM    Referred By: OMKAR LOCK           Confirmed By:Merary Bucio MD    2D ECHO:  No results found. However, due to the size of the patient record, not all encounters were searched. Please check Results Review for a complete set of results.      ASSESSMENT/PLAN:         Anesthesia Evaluation    I have reviewed the Patient Summary Reports.     I have reviewed the Medications.   Steroids Taken In Past Year: Prednisone    Review of Systems  Anesthesia Hx:  History of prior surgery of interest to airway management or planning:   Cardiovascular:   CAD      Pulmonary:   Asthma    Renal/:  Renal/ Normal     Hepatic/GI:   GERD gastroparesis   Musculoskeletal:   Arthritis     Neurological:   Neuromuscular Disease,    Endocrine:  Endocrine Normal           Anesthesia Plan  Type of Anesthesia, risks & benefits discussed:  Anesthesia Type:  general  Patient's Preference:   Intra-op Monitoring Plan: standard ASA monitors  Intra-op Monitoring Plan Comments:   Post Op Pain Control Plan: multimodal analgesia, IV/PO Opioids PRN, per primary service following discharge from PACU and peripheral nerve block  Post Op Pain Control Plan Comments:   Induction:   IV  Beta Blocker:  Patient is not currently on a Beta-Blocker (No further documentation required).       Informed Consent: Patient understands risks and agrees with Anesthesia plan.  Questions answered. Anesthesia consent signed with patient.  ASA Score: 3     Day of Surgery Review of History & Physical:    H&P update referred to the surgeon.         Ready For Surgery  From Anesthesia Perspective.

## 2019-05-21 NOTE — ANESTHESIA POSTPROCEDURE EVALUATION
Anesthesia Post Evaluation    Patient: Joyce Peterson    Procedure(s) Performed: Procedure(s) (LRB):  INSERTION, INTRAMEDULLARY ARIEL, FEMUR (Left)    Final Anesthesia Type: general  Patient location during evaluation: PACU  Patient participation: Yes- Able to Participate  Level of consciousness: awake and alert and oriented  Post-procedure vital signs: reviewed and stable  Pain management: adequate  Airway patency: patent  PONV status at discharge: No PONV  Anesthetic complications: no      Cardiovascular status: stable  Respiratory status: unassisted  Hydration status: euvolemic  Follow-up not needed.          Vitals Value Taken Time   /78 5/21/2019  2:01 PM   Temp 36.7 °C (98 °F) 5/21/2019 12:22 PM   Pulse 90 5/21/2019  2:13 PM   Resp 39 5/21/2019  2:13 PM   SpO2 98 % 5/21/2019  2:13 PM   Vitals shown include unvalidated device data.      No case tracking events are documented in the log.      Pain/Isacc Score: Pain Rating Prior to Med Admin: 10 (5/21/2019  1:40 PM)  Isacc Score: 10 (5/21/2019  1:22 PM)

## 2019-05-21 NOTE — INTERVAL H&P NOTE
No change in H&P.  Now 9 days since last Kavzara dose.  Must maintain steroid dose.  Again explained the increased infection risk.  I am giving her both Vancomycin and Ancef.  To OR for cepholomedullary nail fixation of left femoral neck/intertrochanteric femur stress fracture.  The risks, benefits and alternatives to surgery were discussed with the patient at great length.  These include bleeding, infection, vessel/nerve damage, pain, numbness, tingling, complex regional pain syndrome, hardware/surgical failure, need for further surgery, AVN of femoral head, malunion, nonunion, DVT, PE, arthritis and death.  Patient states an understanding and wishes to proceed with surgery.   All questions were answered.  No guarantees were implied or stated.  Informed consent was obtained.            Active Hospital Problems    Diagnosis  POA    Stress fracture of neck of left femur [M84.352A]  Yes      Resolved Hospital Problems   No resolved problems to display.

## 2019-05-22 LAB
ALBUMIN SERPL BCP-MCNC: 3.2 G/DL (ref 3.5–5.2)
ALP SERPL-CCNC: 58 U/L (ref 55–135)
ALT SERPL W/O P-5'-P-CCNC: 27 U/L (ref 10–44)
ANION GAP SERPL CALC-SCNC: 7 MMOL/L (ref 8–16)
AST SERPL-CCNC: 28 U/L (ref 10–40)
BASOPHILS # BLD AUTO: 0.04 K/UL (ref 0–0.2)
BASOPHILS NFR BLD: 0.5 % (ref 0–1.9)
BILIRUB SERPL-MCNC: 0.3 MG/DL (ref 0.1–1)
BUN SERPL-MCNC: 13 MG/DL (ref 6–20)
CALCIUM SERPL-MCNC: 8.4 MG/DL (ref 8.7–10.5)
CHLORIDE SERPL-SCNC: 111 MMOL/L (ref 95–110)
CO2 SERPL-SCNC: 24 MMOL/L (ref 23–29)
CREAT SERPL-MCNC: 0.8 MG/DL (ref 0.5–1.4)
DIFFERENTIAL METHOD: ABNORMAL
EOSINOPHIL # BLD AUTO: 0 K/UL (ref 0–0.5)
EOSINOPHIL NFR BLD: 0.1 % (ref 0–8)
ERYTHROCYTE [DISTWIDTH] IN BLOOD BY AUTOMATED COUNT: 14.7 % (ref 11.5–14.5)
EST. GFR  (AFRICAN AMERICAN): >60 ML/MIN/1.73 M^2
EST. GFR  (NON AFRICAN AMERICAN): >60 ML/MIN/1.73 M^2
GLUCOSE SERPL-MCNC: 134 MG/DL (ref 70–110)
HCT VFR BLD AUTO: 38.9 % (ref 37–48.5)
HGB BLD-MCNC: 12.3 G/DL (ref 12–16)
IMM GRANULOCYTES # BLD AUTO: 0.04 K/UL (ref 0–0.04)
IMM GRANULOCYTES NFR BLD AUTO: 0.5 % (ref 0–0.5)
LYMPHOCYTES # BLD AUTO: 1.2 K/UL (ref 1–4.8)
LYMPHOCYTES NFR BLD: 15 % (ref 18–48)
MCH RBC QN AUTO: 29.1 PG (ref 27–31)
MCHC RBC AUTO-ENTMCNC: 31.6 G/DL (ref 32–36)
MCV RBC AUTO: 92 FL (ref 82–98)
MONOCYTES # BLD AUTO: 0.7 K/UL (ref 0.3–1)
MONOCYTES NFR BLD: 8.4 % (ref 4–15)
NEUTROPHILS # BLD AUTO: 6 K/UL (ref 1.8–7.7)
NEUTROPHILS NFR BLD: 75.5 % (ref 38–73)
NRBC BLD-RTO: 0 /100 WBC
PLATELET # BLD AUTO: 255 K/UL (ref 150–350)
PMV BLD AUTO: 10.3 FL (ref 9.2–12.9)
POTASSIUM SERPL-SCNC: 3.6 MMOL/L (ref 3.5–5.1)
PROT SERPL-MCNC: 5.6 G/DL (ref 6–8.4)
RBC # BLD AUTO: 4.22 M/UL (ref 4–5.4)
SODIUM SERPL-SCNC: 142 MMOL/L (ref 136–145)
WBC # BLD AUTO: 7.88 K/UL (ref 3.9–12.7)

## 2019-05-22 PROCEDURE — 97530 THERAPEUTIC ACTIVITIES: CPT

## 2019-05-22 PROCEDURE — 97161 PT EVAL LOW COMPLEX 20 MIN: CPT

## 2019-05-22 PROCEDURE — 97165 OT EVAL LOW COMPLEX 30 MIN: CPT

## 2019-05-22 PROCEDURE — 97116 GAIT TRAINING THERAPY: CPT

## 2019-05-22 PROCEDURE — 80053 COMPREHEN METABOLIC PANEL: CPT

## 2019-05-22 PROCEDURE — 25000003 PHARM REV CODE 250: Performed by: STUDENT IN AN ORGANIZED HEALTH CARE EDUCATION/TRAINING PROGRAM

## 2019-05-22 PROCEDURE — 63600175 PHARM REV CODE 636 W HCPCS: Performed by: STUDENT IN AN ORGANIZED HEALTH CARE EDUCATION/TRAINING PROGRAM

## 2019-05-22 PROCEDURE — 36415 COLL VENOUS BLD VENIPUNCTURE: CPT

## 2019-05-22 PROCEDURE — 85025 COMPLETE CBC W/AUTO DIFF WBC: CPT

## 2019-05-22 PROCEDURE — 99231 PR SUBSEQUENT HOSPITAL CARE,LEVL I: ICD-10-PCS | Mod: ,,, | Performed by: ANESTHESIOLOGY

## 2019-05-22 PROCEDURE — 99231 SBSQ HOSP IP/OBS SF/LOW 25: CPT | Mod: ,,, | Performed by: ANESTHESIOLOGY

## 2019-05-22 PROCEDURE — 11000001 HC ACUTE MED/SURG PRIVATE ROOM

## 2019-05-22 RX ORDER — OXYCODONE HYDROCHLORIDE 5 MG/1
5 TABLET ORAL EVERY 4 HOURS PRN
Status: DISCONTINUED | OUTPATIENT
Start: 2019-05-22 | End: 2019-05-23 | Stop reason: HOSPADM

## 2019-05-22 RX ORDER — CEFAZOLIN SODIUM 1 G/3ML
2 INJECTION, POWDER, FOR SOLUTION INTRAMUSCULAR; INTRAVENOUS
Status: DISCONTINUED | OUTPATIENT
Start: 2019-05-22 | End: 2019-05-23 | Stop reason: HOSPADM

## 2019-05-22 RX ORDER — BISACODYL 5 MG
5 TABLET, DELAYED RELEASE (ENTERIC COATED) ORAL 2 TIMES DAILY
Status: DISCONTINUED | OUTPATIENT
Start: 2019-05-22 | End: 2019-05-23 | Stop reason: HOSPADM

## 2019-05-22 RX ORDER — METHOCARBAMOL 750 MG/1
750 TABLET, FILM COATED ORAL 4 TIMES DAILY
Status: DISCONTINUED | OUTPATIENT
Start: 2019-05-22 | End: 2019-05-23 | Stop reason: HOSPADM

## 2019-05-22 RX ADMIN — METHOCARBAMOL TABLETS 750 MG: 750 TABLET, COATED ORAL at 06:05

## 2019-05-22 RX ADMIN — METHOCARBAMOL TABLETS 750 MG: 750 TABLET, COATED ORAL at 11:05

## 2019-05-22 RX ADMIN — CELECOXIB 200 MG: 200 CAPSULE ORAL at 09:05

## 2019-05-22 RX ADMIN — ROPIVACAINE HYDROCHLORIDE 2 ML/HR: 2 INJECTION, SOLUTION EPIDURAL; INFILTRATION at 04:05

## 2019-05-22 RX ADMIN — ASPIRIN 81 MG: 81 TABLET, COATED ORAL at 09:05

## 2019-05-22 RX ADMIN — OXYCODONE HYDROCHLORIDE 10 MG: 10 TABLET ORAL at 09:05

## 2019-05-22 RX ADMIN — PREGABALIN 150 MG: 50 CAPSULE ORAL at 09:05

## 2019-05-22 RX ADMIN — METHOCARBAMOL TABLETS 750 MG: 750 TABLET, COATED ORAL at 09:05

## 2019-05-22 RX ADMIN — ACETAMINOPHEN 1000 MG: 500 TABLET ORAL at 06:05

## 2019-05-22 RX ADMIN — ACETAMINOPHEN 1000 MG: 500 TABLET ORAL at 11:05

## 2019-05-22 RX ADMIN — MORPHINE SULFATE 2 MG: 2 INJECTION, SOLUTION INTRAMUSCULAR; INTRAVENOUS at 09:05

## 2019-05-22 RX ADMIN — ACETAMINOPHEN 1000 MG: 500 TABLET ORAL at 12:05

## 2019-05-22 RX ADMIN — OXYCODONE HYDROCHLORIDE 10 MG: 10 TABLET ORAL at 01:05

## 2019-05-22 RX ADMIN — METHOCARBAMOL TABLETS 750 MG: 750 TABLET, COATED ORAL at 02:05

## 2019-05-22 RX ADMIN — OXYCODONE HYDROCHLORIDE 10 MG: 10 TABLET ORAL at 05:05

## 2019-05-22 RX ADMIN — ROPIVACAINE HYDROCHLORIDE 2 ML/HR: 2 INJECTION, SOLUTION EPIDURAL; INFILTRATION at 02:05

## 2019-05-22 RX ADMIN — PROMETHAZINE HYDROCHLORIDE 6.25 MG: 25 INJECTION INTRAMUSCULAR; INTRAVENOUS at 04:05

## 2019-05-22 RX ADMIN — ROPIVACAINE HYDROCHLORIDE 2 ML/HR: 2 INJECTION, SOLUTION EPIDURAL; INFILTRATION at 09:05

## 2019-05-22 RX ADMIN — MORPHINE SULFATE 2 MG: 2 INJECTION, SOLUTION INTRAMUSCULAR; INTRAVENOUS at 04:05

## 2019-05-22 RX ADMIN — OXYCODONE HYDROCHLORIDE 10 MG: 10 TABLET ORAL at 04:05

## 2019-05-22 NOTE — PLAN OF CARE
05/22/19 0923   Post-Acute Status   Post-Acute Authorization Placement   Post-Acute Placement Status Awaiting Therapy Documentation       SW following for dc needs.Post acute needs to be determined.         Yadira Colon LMSW  [facility]   [id number]

## 2019-05-22 NOTE — ADDENDUM NOTE
Addendum  created 05/22/19 0907 by Luis Alfredo Kirkland MD    Order list changed, Sign clinical note

## 2019-05-22 NOTE — PLAN OF CARE
Problem: Fall Injury Risk  Goal: Absence of Fall and Fall-Related Injury  Pt ambulated in gloria with walker. Tolerated well. Reinforced importance of walking SBA to prevent falls. Pt, spouse and family verbalized understanding.

## 2019-05-22 NOTE — NURSING
Call place to anesthesia [phone]) Re: PNC being paused. Spoke to Rozina, who will reach out to the resident on call for anesthesia pain service.

## 2019-05-22 NOTE — ASSESSMENT & PLAN NOTE
Joyce Peterson is a 58 y.o. female POD 1 s/p left cmn doing well       - Weight bearing status: wbat  - Pain control: Per APS  - Antibiotics: po ancef  - DVT Prophylaxis: asa 81 bid , SCD's at all times when not ambulating.  - PT/OT  - Lines/Drains: none  - Dispo: Home with HH. Patient would liket to stay until tomorrow for pain control, PT/OT

## 2019-05-22 NOTE — PLAN OF CARE
Problem: Physical Therapy Goal  Goal: Physical Therapy Goal  Goals to be met by: 19     Patient will increase functional independence with mobility by performin. Supine to sit with Set-up [place]  2. Sit to supine with Set-up [place]  3. Sit to stand transfer with Supervision  4. Bed to chair transfer with Stand-by Assistance using Rolling Walker  5. Gait  x 150 feet with Stand-by Assistance using Rolling Walker.   6. Ascend/descend 4 stair with left Handrails SBA.   7. Lower extremity exercise program x20-30 reps per handout, with independence to improve muscular endurance      Outcome: Ongoing (interventions implemented as appropriate)  Pt tolerated session well today with no complications and demonstrated appropriate mobility s/p IM nailing of (L) femur. Pt with no LOB during ambulation using RW for support. Pt able to follow 3-point gait sequencing well with no complications. Pt able to step up/down 4 stair steps with no instability noted. Pt reported no pain with supine exercises. Pt will cont to benefit from therapy services and is appropriate for HHPT upon d/c.

## 2019-05-22 NOTE — PT/OT/SLP EVAL
Occupational Therapy   Evaluation/ Treatment    Name: Joyce Peterson  MRN: [id number]  Admitting Diagnosis:  Stress fracture of neck of left femur 1 Day Post-Op    Recommendations:     Discharge Recommendations: home with home health  Discharge Equipment Recommendations:  commode  Barriers to discharge:  Inaccessible home environment    Assessment:     Joyce Peterson is a 58 y.o. female with a medical diagnosis of Stress fracture of neck of left femur.  She presents with the following performance deficits affecting function: weakness, impaired endurance, impaired self care skills, impaired balance, gait instability, impaired functional mobilty, decreased lower extremity function, pain, impaired skin, orthopedic precautions.      OT eval complete. Pt tolerated session well. Despite RA, pt presents w/ good overall B UE strength and ROM to safely perform her functional activities. Pt received education focused on LBD adaptive techniques, gait sequence, bed mobility, and stair training. Pt performed her functional mobility tasks w/ CGA and no complications. Pt will continue to benefit from skilled OT to address the above listed impairments.     Rehab Prognosis: Good; patient would benefit from acute skilled OT services to address these deficits and reach maximum level of function.       Plan:     Patient to be seen daily to address the above listed problems via self-care/home management, therapeutic activities, therapeutic exercises  · Plan of Care Expires: 06/21/19  · Plan of Care Reviewed with: patient, spouse    Subjective     Chief Complaint: none stated   Patient/Family Comments/goals: to return home    Occupational Profile:  Living Environment: pt lives with her  in [facility] w/ 5 NIMESH and B HR support (not attainable simultaneously); pt uses both a walk-in shower and a tub  Previous level of function: PTA, pt reports being independent w/ ADLs and mod I w/ functional mobility (platform RW and cane)  Equipment  Used at Home:  shower chair, walker, rolling, cane, straight, wheelchair, grab bar(platform RW)  Assistance upon Discharge: Pt states she will have assistance from her  upon d/c    Pain/Comfort:  · Pain Rating 1: 3/10  · Location - Side 1: Left  · Location - Orientation 1: generalized  · Location 1: leg  · Pain Addressed 1: Pre-medicate for activity, Reposition, Cessation of Activity  · Pain Rating Post-Intervention 1: (did not rate)    Patients cultural, spiritual, Shinto conflicts given the current situation: no    Objective:     Communicated with: RN prior to session.  Patient found HOB elevated with SCD, perineural catheter upon OT entry to room.    General Precautions: Standard, fall   Orthopedic Precautions:LLE weight bearing as tolerated   Braces: N/A     Occupational Performance:    Bed Mobility:    · Patient completed Scooting/Bridging with contact guard assistance  · Patient completed Supine to Sit with contact guard assistance    Functional Mobility/Transfers:  · Patient completed Sit <> Stand Transfer with contact guard assistance  with  rolling walker   · Functional Mobility: Pt ambulated 75ft w/ CGA and RW for increased stability and safety. No LOB or SOB noted. Pt participated in stair training w/ PT - please refer to PT note for further detail.     Activities of Daily Living:  · Lower Body Dressing: not performed:  pt received education via verbalization and demonstration on adaptive technique    Cognitive/Visual Perceptual:  Cognitive/Psychosocial Skills:     -       Oriented to: Person, Place and Time   -       Follows Commands/attention:Follows multistep  commands  -       Communication: clear/fluent    Physical Exam:  Upper Extremity Range of Motion:     -       Right Upper Extremity: WFL  -       Left Upper Extremity: WFL  Upper Extremity Strength:    -       Right Upper Extremity: WFL  -       Left Upper Extremity: WFL   Strength:    -       Right Upper Extremity: WFL  -        Left Upper Extremity: WFL  Fine Motor Coordination:    -       Intact    AMPAC 6 Click ADL:  AMPAC Total Score: 21    Treatment & Education:  - OT role/POC  - Importance of OOB activity to maximize recovery   - safety w/ functional mobility; hand placement to ensure safe transfers  - Verbally educated on adaptive technique and provided w/ visual demonstration  - Educated pt on adaptive technique to transfer into and out of bed as she reports having pain when lifting L LE to perform.   Education:    Patient left up in chair with all lines intact, call button in reach, RN notified and spouse present    GOALS:   Multidisciplinary Problems     Occupational Therapy Goals        Problem: Occupational Therapy Goal    Goal Priority Disciplines Outcome Interventions   Occupational Therapy Goal     OT, PT/OT Ongoing (interventions implemented as appropriate)    Description:  Goals to be met by: 7 days    Patient will increase functional independence with ADLs by performing:    UE Dressing with Supervision.  LE Dressing with Supervision with AD as needed.  Grooming while standing with Supervision.  Toileting from bedside commode with Supervision for hygiene and clothing management.   Stand pivot transfers with Supervision.  Toilet transfer to bedside commode with Supervision.                         History:     Past Medical History:   Diagnosis Date    Acid reflux     Allergy     Anemia     Asthma     Coronary artery disease     Degenerative disc disease     Dry eyes     Dry mouth     Gastroparesis     Hyperlipidemia     Lateral meniscus derangement 4/6/2016    Lobular carcinoma in situ     Osteoarthritis     Osteoporosis     Rheumatoid arthritis(714.0)     Umbilical hernia 8/13/2015       Past Surgical History:   Procedure Laterality Date    ARTHROSCOPY-KNEE Right 4/6/2016    [facility] 2ND F[facility]    BREAST BIOPSY Left 01/29/2002    core bx    CARPAL TUNNEL RELEASE Right  05/2017    CHOLECYSTECTOMY  2004    COLONOSCOPY      10/11    COLONOSCOPY N/A 6/29/2017    [facility] ENDO (4TH FLR)    ESOPHAGOGASTRODUODENOSCOPY (EGD) N/A 6/29/2017    Per[facility] by [facility] at [facility] ENDO (4TH FLR)    INSERTION, INTRAMEDULLARY ARIEL, FEMUR Left 5/21/2019    Per[facility] by L[facility] Duff [facility] at [facility] OR 2ND FLR    RELEASE-CARPAL TUNNEL / Right Right 5/29/2017    [facility] OR    REPAIR, TENDON, TRICEPS left elbow Left 10/17/2018    [facility] at [facility] OR 1ST FLR    REPAIR-HERNIA-UMBILICAL N/A 8/13/2015    Per[facility] by Ricardo Small [facility] at [facility] OR 2ND FLR    TONSILLECTOMY      TUBAL LIGATION  2003    UPPER GASTROINTESTINAL ENDOSCOPY      10/11    uterine ablation  2003       Time Tracking:     OT Date of Treatment: 05/22/19  OT Start Time: 1038  OT Stop Time: 1100  OT Total Time (min): 22 min    Billable Minutes:Evaluation 10  Therapeutic Activity 12    Radha Liang, OT  5/22/2019

## 2019-05-22 NOTE — NURSING
Pt called spectra link to request pain medication. Upon entering pt's room, pt observed sitting up in bed, eating dinner, no S/S of distress. Pt reported that her pain was an 8/10.    Discussed with pt, trying Oxycodone 5 mg instead of Oxycodone 10 mg for pain relief. Pt states that she does not want to try Oxycodone 5 mg, since she will not be able to get an additional 5 mg (if still needed) prior to next dose (q 4 PRN).    Exact pain level of 7-10/10 for Oxy 10 not discussed with pt.

## 2019-05-22 NOTE — ADDENDUM NOTE
Addendum  created 05/22/19 0936 by Dorcas Castelan MD    Charge Capture section accepted, Sign clinical note

## 2019-05-22 NOTE — PROGRESS NOTES
"[facility]  Orthopedics  Progress Note    Patient Name: Joyce Peterson  MRN: [id number]  Admission Date: 5/21/2019  Hospital Length of Stay: 1 days  Attending Provider: López Duff MD  Primary Care Provider: Becca Peters DO  Follow-up For: Procedure(s) (LRB):  INSERTION, INTRAMEDULLARY ARIEL, FEMUR (Left)    Post-Operative Day: 1 Day Post-Op  Subjective:     Principal Problem:Stress fracture of neck of left femur    Principal Orthopedic Problem: same    Interval History: Patient seen and examined at bedside. Day 1 s/p left CMN.   No acute events overnight.  Pain controlled.  PNC leaking overnight but pain still controlled. Walked to bathroom overnight.       Review of patient's allergies indicates:   Allergen Reactions    Actemra [tocilizumab] Other (See Comments)     Severe dizziness    Codeine Nausea And Vomiting    Gold au 198 Hives and Rash    Hydroxychloroquine Other (See Comments)     Can't remember the reaction      Iodinated contrast- oral and iv dye Other (See Comments)     Other reaction(s): BURNING ALL OVER    Iodine Other (See Comments)     Other reaction(s): BURNING ALL OVER - Iodine dye - Not topical    Sulfa (sulfonamide antibiotics) Other (See Comments)     Can't remember the reaction    Zofran [ondansetron hcl (pf)] Nausea And Vomiting     Pt reports that last time she received zofran she started vomiting again    Methotrexate analogues Nausea Only    Pneumovax 23 [pneumococcal 23-argenis ps vaccine] Other (See Comments)     "sick"       Current Facility-Administered Medications   Medication    acetaminophen tablet 1,000 mg    aspirin EC tablet 81 mg    celecoxib capsule 200 mg    diphenhydrAMINE injection 25 mg    lidocaine (PF) 10 mg/ml (1%) injection 10 mg    methocarbamol tablet 750 mg    morphine injection 2 mg    ondansetron disintegrating tablet 8 mg    oxyCODONE immediate release tablet Tab 10 mg    pregabalin capsule 150 mg    promethazine " "(PHENERGAN) 6.25 mg in dextrose 5 % 50 mL IVPB    promethazine (PHENERGAN) 6.25 mg in dextrose 5 % 50 mL IVPB    ramelteon tablet 8 mg    ropivacaine (PF) 2 mg/ml (0.2%) infusion    sodium chloride 0.9% flush 10 mL    sodium chloride 0.9% flush 10 mL     Facility-Administered Medications Ordered in Other Encounters   Medication    0.9%  NaCl infusion     Objective:     Vital Signs (Most Recent):  Temp: 98.6 °F (37 °C) (05/22/19 0724)  Pulse: 90 (05/22/19 0724)  Resp: 15 (05/22/19 0724)  BP: 120/72 (05/22/19 0724)  SpO2: (!) 94 % (05/22/19 0724) Vital Signs (24h Range):  Temp:  [98 °F (36.7 °C)-98.9 °F (37.2 °C)] 98.6 °F (37 °C)  Pulse:  [] 90  Resp:  [13-28] 15  SpO2:  [92 %-100 %] 94 %  BP: ()/(51-94) 120/72     Weight: 69.9 kg (154 lb)  Height: 5' 1" (154.9 cm)  Body mass index is 29.1 kg/m².      Intake/Output Summary (Last 24 hours) at 5/22/2019 0758  Last data filed at 5/21/2019 1854  Gross per 24 hour   Intake 200 ml   Output 1600 ml   Net -1400 ml       Ortho/SPM Exam  Physical Exam:  NAD, A/O x 3.  Wound c/d/i with clean dressing.  No focal motor or sensory deficits noted.      Significant Labs: All pertinent labs within the past 24 hours have been reviewed.    Significant Imaging: I have reviewed and interpreted all pertinent imaging results/findings.   Left CMN in good alignment.     Assessment/Plan:     * Stress fracture of neck of left femur  Joyce Peterson is a 58 y.o. female POD 1 s/p left cmn doing well       - Weight bearing status: wbat  - Pain control: Per APS  - Antibiotics: po ancef  - DVT Prophylaxis: asa 81 bid , SCD's at all times when not ambulating.  - PT/OT  - Lines/Drains: none  - Dispo: Home with . Patient would liket to stay until tomorrow for pain control, PT/OT                Ricardo Amezquita MD  Orthopedics  [facility]    Attg Note:  Patient seen and examined.  I agree with the resident's assessment and plan.  She walked 75 ft today and the " pain she was having with weight-bearing is essentially gone.  She had some lateral pain in the area with the surgery was performed which is expected.  She will likely go home but needs another day of therapy.  Some issues with nausea which are improving.    López Duff MD

## 2019-05-22 NOTE — PLAN OF CARE
POD # 1 L femur IMN. This CM met with patient and spouse at bedside. Patient spouse will provide transport home at discharge. Discharge plan pending care team recommendation.      [id number] [place] - [address] [facility]  [address] [place] [address]  Phone: [phone] Fax: [phone]    [facility] - [place], [place] - [address]  [address]  [address]  [facility] [address]  Phone: [phone] Fax: [phone]    [facility] [place] - [place] - [address]  [address] [place] [address]  Phone: [phone] Fax: [phone]    Payor: [facility] MCARE / Plan: [employer] MEDICARE ADVANTAGE / Product Type: Medicare Advantage /         05/22/19 0905   Discharge Assessment   Assessment Type Discharge Planning Assessment   Confirmed/corrected address and phone number on facesheet? Yes   Assessment information obtained from? Patient   Expected Length of Stay (days) 1   Communicated expected length of stay with patient/caregiver no   Prior to hospitilization cognitive status: Alert/Oriented   Prior to hospitalization functional status: Independent   Current cognitive status: Alert/Oriented   Current Functional Status: Assistive Equipment;Needs Assistance   Facility Arrived From:   (spouse Kurt [phone])   Lives With spouse   Able to Return to Prior Arrangements yes   Is patient able to care for self after discharge? Yes   Who are your caregiver(s) and their phone number(s)?   (Kurt [phone])   Patient's perception of discharge disposition home or selfcare   Readmission Within the Last 30 Days no previous admission in last 30 days   Patient currently being followed by outpatient case management? No   Patient currently receives any other outside agency services? No   Equipment Currently Used at Home walker, rolling;shower chair   Do you have any problems affording any of your prescribed  medications? No   Is the patient taking medications as prescribed? yes   Does the patient have transportation home? Yes   Transportation Anticipated family or friend will provide   Does the patient receive services at the [facility]? No   Discharge Plan A Home;Home Health   Discharge Plan B Home with family   DME Needed Upon Discharge  other (see comments)  (TBD)   Patient/Family in Agreement with Plan yes       Lisa Lindsey RN/BSN/PILAR  [facility]  [phone]  Ortho/IMK/IM

## 2019-05-22 NOTE — PT/OT/SLP EVAL
Physical Therapy Evaluation    Patient Name:  Joyce Peterson   MRN:  [id number]    Recommendations:     Discharge Recommendations:  home health PT   Discharge Equipment Recommendations: commode   Barriers to discharge: None    Assessment:     Joyce Peterson is a 58 y.o. female admitted with a medical diagnosis of Stress fracture of neck of left femur.  She presents with the following impairments/functional limitations:  weakness, impaired endurance, impaired self care skills, gait instability, impaired functional mobilty, pain, impaired joint extensibility, decreased lower extremity function, decreased ROM, orthopedic precautions, impaired balance.    -Pt tolerated session well today with no complications and demonstrated appropriate mobility s/p IM nailing of (L) femur. Pt with no LOB during ambulation using RW for support. Pt able to follow 3-point gait sequencing well with no complications. Pt able to step up/down 4 stair steps with no instability noted. Pt reported no pain with supine exercises. Pt will cont to benefit from therapy services and is appropriate for HHPT upon d/c.    Rehab Prognosis: Good; patient would benefit from acute skilled PT services to address these deficits and reach maximum level of function.    Recent Surgery: Procedure(s) (LRB):  INSERTION, INTRAMEDULLARY ARIEL, FEMUR (Left) 1 Day Post-Op    Plan:     During this hospitalization, patient to be seen daily to address the identified rehab impairments via gait training, therapeutic activities, therapeutic exercises and progress toward the following goals:    · Plan of Care Expires:  06/22/19    Subjective     Chief Complaint: impaired mobility  Patient/Family Comments/goals: return home to [facility]  Pain/Comfort:  · Pain Rating 1: 3/10  · Location - Side 1: Left  · Location - Orientation 1: generalized  · Location 1: leg  · Pain Addressed 1: Pre-medicate for activity, Reposition, Cessation of Activity    Patients cultural, spiritual,  Gnosticism conflicts given the current situation:      Living Environment:  -Pt lives with her  in a H with 5 NIMESH and wide (B) rails. Pt has a platform RW at home she used PRN PTA. Pt has both a walk-in shower and tub/shower combo.  Prior to admission, patients level of function was (I).  Equipment used at home: walker, rolling, shower chair(platform RW).  DME owned (not currently used): .  Upon discharge, patient will have assistance from family.    Objective:     Communicated with nsg prior to session.  Patient found supine with perineural catheter, SCD  upon PT entry to room.    General Precautions: Standard, fall   Orthopedic Precautions:LLE weight bearing as tolerated   Braces: N/A     Exams:  · Sensation:    · -       Intact  · RLE ROM: WNL  · RLE Strength: WNL  · LLE ROM: WFL  · LLE Strength: Deficits: 4/5 grossly    Functional Mobility:  · Bed Mobility:     · Scooting: contact guard assistance  · Supine to Sit: contact guard assistance  · Transfers:     · Sit to Stand:  contact guard assistance with rolling walker  · Gait: 75' x1 trial using RW with CGA  · Stairs:  Pt ascended/descended 5 stair(s) with No Assistive Device with left handrail with Contact Guard Assistance.       Therapeutic Activities and Exercises:   -Pt performed THR protocol exercises x30 reps of:  A. AP  B. GS  C. QS    -Pt educated on:  A. PT POC and role of PT  B. Importance of OOB activity to improve functional outcomes after surgery  C. Maintaining WBing status on affected LE during mobility  D. DME mgmt and t/f sequencing  E. Gait sequencing  F. Performing HEP to reduce the risk of developing blood clots       AM-PAC 6 CLICK MOBILITY  Total Score:21     Patient left up in chair with all lines intact, call button in reach and nsg notified.    GOALS:   Multidisciplinary Problems     Physical Therapy Goals        Problem: Physical Therapy Goal    Goal Priority Disciplines Outcome Goal Variances Interventions   Physical Therapy  Goal     PT, PT/OT Ongoing (interventions implemented as appropriate)     Description:  Goals to be met by: 19     Patient will increase functional independence with mobility by performin. Supine to sit with Set-up [place]  2. Sit to supine with Set-up [place]  3. Sit to stand transfer with Supervision  4. Bed to chair transfer with Stand-by Assistance using Rolling Walker  5. Gait  x 150 feet with Stand-by Assistance using Rolling Walker.   6. Ascend/descend 4 stair with left Handrails SBA.   7. Lower extremity exercise program x20-30 reps per handout, with independence to improve muscular endurance                        History:     Past Medical History:   Diagnosis Date    Acid reflux     Allergy     Anemia     Asthma     Coronary artery disease     Degenerative disc disease     Dry eyes     Dry mouth     Gastroparesis     Hyperlipidemia     Lateral meniscus derangement 2016    Lobular carcinoma in situ     Osteoarthritis     Osteoporosis     Rheumatoid arthritis(714.0)     Umbilical hernia 2015       Past Surgical History:   Procedure Laterality Date    ARTHROSCOPY-KNEE Right 2016    [facility] OR 2ND FLR    BREAST BIOPSY Left 2002    core bx    CARPAL TUNNEL RELEASE Right 2017    CHOLECYSTECTOMY  2004    COLONOSCOPY      10/11    COLONOSCOPY N/A 2017    Performed by L[facility]pez Moore [facility] at [facility] ENDO (4TH FLR)    ESOPHAGOGASTRODUODENOSCOPY (EGD) N/A 2017    Per[facility] by [facility] ENDO (4TH FLR)    INSERTION, INTRAMEDULLARY ARIEL, FEMUR Left 2019    Per[facility] by [facility] Duff [facility] at [facility] OR 2ND FLR    RELEASE-CARPAL TUNNEL / Right Right 2017    [facility]rough [facility] at [facility] OR    REPAIR, TENDON, TRICEPS left elbow Left 10/17/2018    Per[facility] at [facility] OR 1ST FLR    REPAIR-HERNIA-UMBILICAL N/A 2015    Performed by Ricardo CORNELL  [facility] at [facility] OR [facility] F[facility]    TONSILLECTOMY      TUBAL LIGATION  2003    UPPER GASTROINTESTINAL ENDOSCOPY      10/11    uterine ablation  2003       Time Tracking:     PT Received On: 05/22/19  PT Start Time: 1040     PT Stop Time: 1104  PT Total Time (min): 24 min     Billable Minutes: Evaluation 14 and Gait Training 10      Manjeet Escobar, PT  05/22/2019

## 2019-05-22 NOTE — ANESTHESIA POST-OP PAIN MANAGEMENT
2019    Called to bedside for catheter leak and increasing pain. Determined leak source to be the micron filter. Both infusion tubing and alligator clamp replaced. Bolused x1 for 9/10 pain.    Mark De La Fuente MD  CA-1/PGY-2  [id number]

## 2019-05-22 NOTE — ANESTHESIA POST-OP PAIN MANAGEMENT
Acute Pain Service Progress Note    Joyce Peterson is a 58 y.o., female, [id number].    Surgery:  L femur IMN    Post Op Day #: 1    Catheter type: perineural  Left SIFI    Infusion type: Ropivacaine 0.2%  2ml/h basal with 10ml/h IB    Pt's pain was tolerable ON until catheter began to leak ~0330. Tubing reconnected properly and exchanged by night float. Per nursing, p's PNC paused by ortho @ 0600 2/2 leak and PO meds also adjusted. Examined connections (no disconnects) and restarted PNC + bolus. No leak detected and pt started to receive some pain relief. t reports nausea a/w her oral narcotic meds.    Problem List:    Active Hospital Problems    Diagnosis  POA    *Stress fracture of neck of left femur [M84.352A]  Yes      Resolved Hospital Problems   No resolved problems to display.       Subjective:     General appearance of alert, oriented, no complaints   Pain with rest: 6    Numbers   Pain with movement: 10    Numbers   Side Effects    1. Pruritis No    2. Nausea Yes    3. Motor Blockade No, 0=Ability to raise lower extremities off bed    4. Sedation No, 1=awake and alert    Objective:     Catheter site clean, dry, intact      Vitals   Vitals:    05/22/19 0400   BP: 132/63   Pulse: 83   Resp: 18   Temp: 36.8 °C (98.3 °F)        Labs    Admission on 05/21/2019   Component Date Value Ref Range Status    Group & Rh 05/21/2019 A POS   Final    Indirect Goran 05/21/2019 NEG   Final        Meds   Current Facility-Administered Medications   Medication Dose Route Frequency Provider Last Rate Last Dose    acetaminophen tablet 1,000 mg  1,000 mg Oral Q6H Ricardo Amezquita MD   1,000 mg at 05/22/19 0614    aspirin EC tablet 81 mg  81 mg Oral Daily Ricardo Amezquita MD   81 mg at 05/21/19 1601    celecoxib capsule 200 mg  200 mg Oral Daily Ricardo Amezquita MD        diphenhydrAMINE injection 25 mg  25 mg Intravenous Q4H PRN Ricardo Amezquita MD        lidocaine (PF) 10 mg/ml (1%) injection 10 mg  1 mL Other Once  Ricardo Amezquita MD        methocarbamol tablet 750 mg  750 mg Oral QID PRN Luis Alfredo Kirkland MD   750 mg at 05/22/19 0617    morphine injection 2 mg  2 mg Intravenous Q4H PRN Ricardo Amezquita MD   2 mg at 05/22/19 0428    ondansetron disintegrating tablet 8 mg  8 mg Oral Q8H PRN Ricardo Amezquita MD        oxyCODONE immediate release tablet Tab 10 mg  10 mg Oral Q4H PRN Ricardo Amezquita MD   10 mg at 05/22/19 0407    pregabalin capsule 150 mg  150 mg Oral QHS Odette Trejo MD        promethazine (PHENERGAN) 6.25 mg in dextrose 5 % 50 mL IVPB  6.25 mg Intravenous Q6H PRN Ricardo Amezquita  mL/hr at 05/21/19 2015 6.25 mg at 05/21/19 2015    promethazine (PHENERGAN) 6.25 mg in dextrose 5 % 50 mL IVPB  6.25 mg Intravenous Q6H PRN Ricardo Amezquita  mL/hr at 05/22/19 0408 6.25 mg at 05/22/19 0408    ramelteon tablet 8 mg  8 mg Oral Nightly PRN Ricardo Amezquita MD        ropivacaine (PF) 2 mg/ml (0.2%) infusion  2 mL/hr Perineural Continuous Ricardo Amezquita MD 2 mL/hr at 05/22/19 0407 2 mL/hr at 05/22/19 0407    sodium chloride 0.9% flush 10 mL  10 mL Intravenous PRN Ricardo Amezquita MD        sodium chloride 0.9% flush 10 mL  10 mL Intravenous PRN Ricardo Amezquita MD         Facility-Administered Medications Ordered in Other Encounters   Medication Dose Route Frequency Provider Last Rate Last Dose    0.9%  NaCl infusion   Intravenous Continuous Callum Singer MD   Stopped at 05/21/19 1100        Anticoagulant dose ASA at 81mg.    Assessment:     Pain control adequate    Plan:     Patient doing well, continue present treatment.     Will d/w staff.     Evaluator Luis Alfredo Kirkland     I have seen the patient, reviewed the Resident's assessment and plan. I have personally interviewed and examined the patient at bedside and: agree with the findings.

## 2019-05-22 NOTE — PLAN OF CARE
Problem: Occupational Therapy Goal  Goal: Occupational Therapy Goal  Goals to be met by: 7 days    Patient will increase functional independence with ADLs by performing:    UE Dressing with Supervision.  LE Dressing with Supervision with AD as needed.  Grooming while standing with Supervision.  Toileting from bedside commode with Supervision for hygiene and clothing management.   Stand pivot transfers with Supervision.  Toilet transfer to bedside commode with Supervision.       Outcome: Ongoing (interventions implemented as appropriate)  OT eval complete. Pt tolerated session well. Pt's functional outcomes have been established today based on her presenting functional level.       Comments: Cont OT POC

## 2019-05-22 NOTE — SUBJECTIVE & OBJECTIVE
"Principal Problem:Stress fracture of neck of left femur    Principal Orthopedic Problem: same    Interval History: Patient seen and examined at bedside. Day 1 s/p left CMN.   No acute events overnight.  Pain controlled.  PNC leaking overnight but pain still controlled. Walked to bathroom overnight.       Review of patient's allergies indicates:   Allergen Reactions    Actemra [tocilizumab] Other (See Comments)     Severe dizziness    Codeine Nausea And Vomiting    Gold au 198 Hives and Rash    Hydroxychloroquine Other (See Comments)     Can't remember the reaction      Iodinated contrast- oral and iv dye Other (See Comments)     Other reaction(s): BURNING ALL OVER    Iodine Other (See Comments)     Other reaction(s): BURNING ALL OVER - Iodine dye - Not topical    Sulfa (sulfonamide antibiotics) Other (See Comments)     Can't remember the reaction    Zofran [ondansetron hcl (pf)] Nausea And Vomiting     Pt reports that last time she received zofran she started vomiting again    Methotrexate analogues Nausea Only    Pneumovax 23 [pneumococcal 23-argenis ps vaccine] Other (See Comments)     "sick"       Current Facility-Administered Medications   Medication    acetaminophen tablet 1,000 mg    aspirin EC tablet 81 mg    celecoxib capsule 200 mg    diphenhydrAMINE injection 25 mg    lidocaine (PF) 10 mg/ml (1%) injection 10 mg    methocarbamol tablet 750 mg    morphine injection 2 mg    ondansetron disintegrating tablet 8 mg    oxyCODONE immediate release tablet Tab 10 mg    pregabalin capsule 150 mg    promethazine (PHENERGAN) 6.25 mg in dextrose 5 % 50 mL IVPB    promethazine (PHENERGAN) 6.25 mg in dextrose 5 % 50 mL IVPB    ramelteon tablet 8 mg    ropivacaine (PF) 2 mg/ml (0.2%) infusion    sodium chloride 0.9% flush 10 mL    sodium chloride 0.9% flush 10 mL     Facility-Administered Medications Ordered in Other Encounters   Medication    0.9%  NaCl infusion     Objective:     Vital Signs (Most " "Recent):  Temp: 98.6 °F (37 °C) (05/22/19 0724)  Pulse: 90 (05/22/19 0724)  Resp: 15 (05/22/19 0724)  BP: 120/72 (05/22/19 0724)  SpO2: (!) 94 % (05/22/19 0724) Vital Signs (24h Range):  Temp:  [98 °F (36.7 °C)-98.9 °F (37.2 °C)] 98.6 °F (37 °C)  Pulse:  [] 90  Resp:  [13-28] 15  SpO2:  [92 %-100 %] 94 %  BP: ()/(51-94) 120/72     Weight: 69.9 kg (154 lb)  Height: 5' 1" (154.9 cm)  Body mass index is 29.1 kg/m².      Intake/Output Summary (Last 24 hours) at 5/22/2019 0758  Last data filed at 5/21/2019 1854  Gross per 24 hour   Intake 200 ml   Output 1600 ml   Net -1400 ml       Ortho/SPM Exam  Physical Exam:  NAD, A/O x 3.  Wound c/d/i with clean dressing.  No focal motor or sensory deficits noted.      Significant Labs: All pertinent labs within the past 24 hours have been reviewed.    Significant Imaging: I have reviewed and interpreted all pertinent imaging results/findings.   Left CMN in good alignment.   "

## 2019-05-23 ENCOUNTER — PATIENT MESSAGE (OUTPATIENT)
Dept: ORTHOPEDICS | Facility: CLINIC | Age: 59
End: 2019-05-23

## 2019-05-23 VITALS
TEMPERATURE: 98 F | HEIGHT: 61 IN | RESPIRATION RATE: 18 BRPM | BODY MASS INDEX: 29.07 KG/M2 | DIASTOLIC BLOOD PRESSURE: 61 MMHG | SYSTOLIC BLOOD PRESSURE: 103 MMHG | OXYGEN SATURATION: 94 % | HEART RATE: 88 BPM | WEIGHT: 154 LBS

## 2019-05-23 PROCEDURE — 63600175 PHARM REV CODE 636 W HCPCS: Performed by: STUDENT IN AN ORGANIZED HEALTH CARE EDUCATION/TRAINING PROGRAM

## 2019-05-23 PROCEDURE — 25000003 PHARM REV CODE 250: Performed by: STUDENT IN AN ORGANIZED HEALTH CARE EDUCATION/TRAINING PROGRAM

## 2019-05-23 PROCEDURE — 99231 PR SUBSEQUENT HOSPITAL CARE,LEVL I: ICD-10-PCS | Mod: ,,, | Performed by: ANESTHESIOLOGY

## 2019-05-23 PROCEDURE — 97116 GAIT TRAINING THERAPY: CPT

## 2019-05-23 PROCEDURE — 99231 SBSQ HOSP IP/OBS SF/LOW 25: CPT | Mod: ,,, | Performed by: ANESTHESIOLOGY

## 2019-05-23 RX ORDER — OXYCODONE AND ACETAMINOPHEN 10; 325 MG/1; MG/1
1 TABLET ORAL EVERY 4 HOURS PRN
Qty: 10 TABLET | Refills: 0 | Status: SHIPPED | OUTPATIENT
Start: 2019-05-23 | End: 2019-06-04

## 2019-05-23 RX ORDER — PROMETHAZINE HYDROCHLORIDE 12.5 MG/1
12.5 TABLET ORAL EVERY 6 HOURS PRN
Qty: 60 TABLET | Refills: 0 | Status: SHIPPED | OUTPATIENT
Start: 2019-05-23 | End: 2019-07-16

## 2019-05-23 RX ADMIN — OXYCODONE HYDROCHLORIDE 10 MG: 10 TABLET ORAL at 01:05

## 2019-05-23 RX ADMIN — METHOCARBAMOL TABLETS 750 MG: 750 TABLET, COATED ORAL at 12:05

## 2019-05-23 RX ADMIN — METHOCARBAMOL TABLETS 750 MG: 750 TABLET, COATED ORAL at 08:05

## 2019-05-23 RX ADMIN — OXYCODONE HYDROCHLORIDE 10 MG: 10 TABLET ORAL at 05:05

## 2019-05-23 RX ADMIN — OXYCODONE HYDROCHLORIDE 10 MG: 10 TABLET ORAL at 09:05

## 2019-05-23 RX ADMIN — ROPIVACAINE HYDROCHLORIDE 2 ML/HR: 2 INJECTION, SOLUTION EPIDURAL; INFILTRATION at 05:05

## 2019-05-23 RX ADMIN — BISACODYL 5 MG: 5 TABLET, COATED ORAL at 08:05

## 2019-05-23 RX ADMIN — ACETAMINOPHEN 1000 MG: 500 TABLET ORAL at 05:05

## 2019-05-23 RX ADMIN — ASPIRIN 81 MG: 81 TABLET, COATED ORAL at 08:05

## 2019-05-23 NOTE — ADDENDUM NOTE
Addendum  created 05/23/19 1111 by Dorcas Castelan MD    Charge Capture section accepted, Sign clinical note

## 2019-05-23 NOTE — PLAN OF CARE
Problem: Physical Therapy Goal  Goal: Physical Therapy Goal  Goals to be met by: 19     Patient will increase functional independence with mobility by performin. Supine to sit with Set-up [place]  2. Sit to supine with Set-up [place]  3. Sit to stand transfer with Supervision  4. Bed to chair transfer with Stand-by Assistance using Rolling Walker  5. Gait  x 150 feet with Stand-by Assistance using Rolling Walker.   6. Ascend/descend 4 stair with left Handrails SBA.   7. Lower extremity exercise program x20-30 reps per handout, with independence to improve muscular endurance       Outcome: Outcome(s) achieved Date Met: 19  Pt tolerated session well today with no complications and demonstrated appropriate mobility s/p IM nailing of (R) femur. Pt with no LOB during ambulation using RW for support. Pt able to follow 3-point gait sequencing well with no complications. Pt able to step up/down 5 stair steps with no instability noted. Pt verbalized understanding of car t/f technique and is safe to d/c home with HHPT at this time.

## 2019-05-23 NOTE — PT/OT/SLP DISCHARGE
Physical Therapy Discharge Summary    Name: Joyce Peterson  MRN: [id number]   Principal Problem: Stress fracture of neck of left femur     Patient Discharged from acute Physical Therapy on 19.  Please refer to prior PT noted date on 19 for functional status.     Assessment:     Patient has met all goals and is not appropriate for therapy.    Objective:     GOALS:   Multidisciplinary Problems     Physical Therapy Goals     Not on file          Multidisciplinary Problems (Resolved)        Problem: Physical Therapy Goal    Goal Priority Disciplines Outcome Goal Variances Interventions   Physical Therapy Goal   (Resolved)     PT, PT/OT Outcome(s) achieved     Description:  Goals to be met by: 19     Patient will increase functional independence with mobility by performin. Supine to sit with Set-up [place]  2. Sit to supine with Set-up [place]  3. Sit to stand transfer with Supervision  4. Bed to chair transfer with Stand-by Assistance using Rolling Walker  5. Gait  x 150 feet with Stand-by Assistance using Rolling Walker.   6. Ascend/descend 4 stair with left Handrails SBA.   7. Lower extremity exercise program x20-30 reps per handout, with independence to improve muscular endurance                        Reasons for Discontinuation of Therapy Services  Satisfactory goal achievement.      Plan:     Patient Discharged to: Home with Home Health Service.    Manjeet Escobar, PT  2019

## 2019-05-23 NOTE — ANESTHESIA POST-OP PAIN MANAGEMENT
Acute Pain Service Progress Note    Joyce Peterson is a 58 y.o., female, [id number].    Surgery:  L femur IMN    Post Op Day #: 2    Catheter type: perineural  Left SIFI    Infusion type: Ropivacaine 0.2%  2ml/h basal with 10ml/h IB    Problem List:    Active Hospital Problems    Diagnosis  POA    *Stress fracture of neck of left femur [M84.352A]  Yes      Resolved Hospital Problems   No resolved problems to display.       Subjective:     General appearance of alert, oriented, no complaints   Pain with rest: 7    Numbers   Pain with movement: 10    Numbers   Side Effects    1. Pruritis No    2. Nausea Yes    3. Motor Blockade No, 0=Ability to raise lower extremities off bed    4. Sedation No, 1=awake and alert    Objective:     Catheter site clean, dry, intact      Vitals   Vitals:    05/23/19 0408   BP: 119/67   Pulse: 86   Resp:    Temp: 36.6 °C (97.8 °F)        Labs    Admission on 05/21/2019   Component Date Value Ref Range Status    Group & Rh 05/21/2019 A POS   Final    Indirect Goran 05/21/2019 NEG   Final    Sodium 05/22/2019 142  136 - 145 mmol/L Final    Potassium 05/22/2019 3.6  3.5 - 5.1 mmol/L Final    Chloride 05/22/2019 111* 95 - 110 mmol/L Final    CO2 05/22/2019 24  23 - 29 mmol/L Final    Glucose 05/22/2019 134* 70 - 110 mg/dL Final    BUN, Bld 05/22/2019 13  6 - 20 mg/dL Final    Creatinine 05/22/2019 0.8  0.5 - 1.4 mg/dL Final    Calcium 05/22/2019 8.4* 8.7 - 10.5 mg/dL Final    Total Protein 05/22/2019 5.6* 6.0 - 8.4 g/dL Final    Albumin 05/22/2019 3.2* 3.5 - 5.2 g/dL Final    Total Bilirubin 05/22/2019 0.3  0.1 - 1.0 mg/dL Final    Alkaline Phosphatase 05/22/2019 58  55 - 135 U/L Final    AST 05/22/2019 28  10 - 40 U/L Final    ALT 05/22/2019 27  10 - 44 U/L Final    Anion Gap 05/22/2019 7* 8 - 16 mmol/L Final    eGFR if African American 05/22/2019 >60.0  >60 mL/min/1.73 m^2 Final    eGFR if non African American 05/22/2019 >60.0  >60 mL/min/1.73 m^2 Final    WBC  05/22/2019 7.88  3.90 - 12.70 K/uL Final    RBC 05/22/2019 4.22  4.00 - 5.40 M/uL Final    Hemoglobin 05/22/2019 12.3  12.0 - 16.0 g/dL Final    Hematocrit 05/22/2019 38.9  37.0 - 48.5 % Final    Mean Corpuscular Volume 05/22/2019 92  82 - 98 fL Final    Mean Corpuscular Hemoglobin 05/22/2019 29.1  27.0 - 31.0 pg Final    Mean Corpuscular Hemoglobin Conc 05/22/2019 31.6* 32.0 - 36.0 g/dL Final    RDW 05/22/2019 14.7* 11.5 - 14.5 % Final    Platelets 05/22/2019 255  150 - 350 K/uL Final    MPV 05/22/2019 10.3  9.2 - 12.9 fL Final    Immature Granulocytes 05/22/2019 0.5  0.0 - 0.5 % Final    Gran # (ANC) 05/22/2019 6.0  1.8 - 7.7 K/uL Final    Immature Grans (Abs) 05/22/2019 0.04  0.00 - 0.04 K/uL Final    Lymph # 05/22/2019 1.2  1.0 - 4.8 K/uL Final    Mono # 05/22/2019 0.7  0.3 - 1.0 K/uL Final    Eos # 05/22/2019 0.0  0.0 - 0.5 K/uL Final    Baso # 05/22/2019 0.04  0.00 - 0.20 K/uL Final    nRBC 05/22/2019 0  0 /100 WBC Final    Gran% 05/22/2019 75.5* 38.0 - 73.0 % Final    Lymph% 05/22/2019 15.0* 18.0 - 48.0 % Final    Mono% 05/22/2019 8.4  4.0 - 15.0 % Final    Eosinophil% 05/22/2019 0.1  0.0 - 8.0 % Final    Basophil% 05/22/2019 0.5  0.0 - 1.9 % Final    Differential Method 05/22/2019 Automated   Final        Meds   Current Facility-Administered Medications   Medication Dose Route Frequency Provider Last Rate Last Dose    aspirin EC tablet 81 mg  81 mg Oral Daily Ricardo Amezquita MD   81 mg at 05/22/19 0947    bisacodyl EC tablet 5 mg  5 mg Oral BID Ricardo Amezquita MD        ceFAZolin injection 2 g  2 g Intravenous On Call Procedure Ricardo Amezquita MD        celecoxib capsule 200 mg  200 mg Oral Daily Ricardo Amezquita MD   200 mg at 05/22/19 0947    diphenhydrAMINE injection 25 mg  25 mg Intravenous Q4H PRN Ricardo Amezquita MD        lidocaine (PF) 10 mg/ml (1%) injection 10 mg  1 mL Other Once Ricardo Amezquita MD        methocarbamol tablet 750 mg  750 mg Oral QID Luis Alfredo  NEVAEH Kirkland MD   750 mg at 05/22/19 2132    oxyCODONE immediate release tablet 5 mg  5 mg Oral Q4H PRN Luis Alfredo Kirkland MD        oxyCODONE immediate release tablet Tab 10 mg  10 mg Oral Q4H PRN Ricardo Amezquita MD   10 mg at 05/23/19 0543    pregabalin capsule 150 mg  150 mg Oral QHS Odette Trejo MD   150 mg at 05/22/19 2132    promethazine (PHENERGAN) 6.25 mg in dextrose 5 % 50 mL IVPB  6.25 mg Intravenous Q6H PRN Ricardo Amezquita  mL/hr at 05/21/19 2015 6.25 mg at 05/21/19 2015    ramelteon tablet 8 mg  8 mg Oral Nightly PRN Ricardo Amezquita MD        ropivacaine (PF) 2 mg/ml (0.2%) infusion  2 mL/hr Perineural Continuous Ricardo Amezquita MD 2 mL/hr at 05/23/19 0547 2 mL/hr at 05/23/19 0547    sodium chloride 0.9% flush 10 mL  10 mL Intravenous PRN Ricardo Amezquita MD        sodium chloride 0.9% flush 10 mL  10 mL Intravenous PRN Ricardo Amezquita MD         Facility-Administered Medications Ordered in Other Encounters   Medication Dose Route Frequency Provider Last Rate Last Dose    0.9%  NaCl infusion   Intravenous Continuous Callum Singer MD   Stopped at 05/21/19 1100        Anticoagulant dose ASA at 81mg.    Assessment:     Pain control adequate    Plan:     Patient doing well, continue present treatment. PNC paused this AM in anticipation of DC, plan to pull during rounds. IV breakthrough meds DC'd. Robaxin scheduled QID.    Once PNC removed, will sign-off. Please call with any questions.    Will d/w staff.     Evaluator Luis Alfredo Kirkland     Catheter pulled this am. Blue tip intact. Pain controlled    I have seen the patient, reviewed the Resident's assessment and plan. I have personally interviewed and examined the patient at bedside and: agree with the findings.

## 2019-05-23 NOTE — PROGRESS NOTES
"[facility]  Orthopedics  Progress Note    Patient Name: Joyce Peterson  MRN: [id number]  Admission Date: 5/21/2019  Hospital Length of Stay: 2 days  Attending Provider: López Duff MD  Primary Care Provider: Becca Peters DO  Follow-up For: Procedure(s) (LRB):  INSERTION, INTRAMEDULLARY ARIEL, FEMUR (Left)    Post-Operative Day: 2 Days Post-Op  Subjective:     Principal Problem:Stress fracture of neck of left femur    Principal Orthopedic Problem: same    Interval History: Patient seen and examined at bedside. Day 1 s/p left CMN.   No acute events overnight.  Pain controlled.  75 feet with PT yesterday then walked 3 times in afternoon with family/ nursing staff. Labs stable.       Review of patient's allergies indicates:   Allergen Reactions    Actemra [tocilizumab] Other (See Comments)     Severe dizziness    Codeine Nausea And Vomiting    Gold au 198 Hives and Rash    Hydroxychloroquine Other (See Comments)     Can't remember the reaction      Iodinated contrast- oral and iv dye Other (See Comments)     Other reaction(s): BURNING ALL OVER    Iodine Other (See Comments)     Other reaction(s): BURNING ALL OVER - Iodine dye - Not topical    Sulfa (sulfonamide antibiotics) Other (See Comments)     Can't remember the reaction    Zofran [ondansetron hcl (pf)] Nausea And Vomiting     Pt reports that last time she received zofran she started vomiting again    Methotrexate analogues Nausea Only    Pneumovax 23 [pneumococcal 23-argenis ps vaccine] Other (See Comments)     "sick"       Current Facility-Administered Medications   Medication    acetaminophen tablet 1,000 mg    aspirin EC tablet 81 mg    bisacodyl EC tablet 5 mg    ceFAZolin injection 2 g    celecoxib capsule 200 mg    diphenhydrAMINE injection 25 mg    lidocaine (PF) 10 mg/ml (1%) injection 10 mg    methocarbamol tablet 750 mg    oxyCODONE immediate release tablet 5 mg    oxyCODONE immediate release tablet Tab 10 mg " "   pregabalin capsule 150 mg    promethazine (PHENERGAN) 6.25 mg in dextrose 5 % 50 mL IVPB    ramelteon tablet 8 mg    ropivacaine (PF) 2 mg/ml (0.2%) infusion    sodium chloride 0.9% flush 10 mL    sodium chloride 0.9% flush 10 mL     Facility-Administered Medications Ordered in Other Encounters   Medication    0.9%  NaCl infusion     Objective:     Vital Signs (Most Recent):  Temp: 97.8 °F (36.6 °C) (05/23/19 0408)  Pulse: 86 (05/23/19 0408)  Resp: 18 (05/22/19 2020)  BP: 119/67 (05/23/19 0408)  SpO2: (!) 93 % (05/23/19 0408) Vital Signs (24h Range):  Temp:  [97.8 °F (36.6 °C)-98.6 °F (37 °C)] 97.8 °F (36.6 °C)  Pulse:  [80-93] 86  Resp:  [15-18] 18  SpO2:  [93 %-95 %] 93 %  BP: (100-134)/(56-72) 119/67     Weight: 69.9 kg (154 lb)  Height: 5' 1" (154.9 cm)  Body mass index is 29.1 kg/m².    No intake or output data in the 24 hours ending 05/23/19 0533    Ortho/SPM Exam    Physical Exam:  NAD, A/O x 3.  Wound c/d/i with clean dressing.  No focal motor or sensory deficits noted.      Significant Labs: All pertinent labs within the past 24 hours have been reviewed.    Significant Imaging: I have reviewed and interpreted all pertinent imaging results/findings.   Left CMN in good alignment.     Assessment/Plan:     * Stress fracture of neck of left femur  Joyce Peterson is a 58 y.o. female POD 2 s/p left cmn doing well       - Weight bearing status: wbat  - Pain control: Per APS  - Antibiotics: po ancef  - DVT Prophylaxis: asa 81 bid , SCD's at all times when not ambulating.  - PT/OT  - Lines/Drains: none  - Dispo: Home with . DC today                Ricardo Amezquita MD  Orthopedics  [facility]    Attg Note:  I agree with the resident's assessment and plan.  D/C home today.    López Duff MD    "

## 2019-05-23 NOTE — SUBJECTIVE & OBJECTIVE
"Principal Problem:Stress fracture of neck of left femur    Principal Orthopedic Problem: same    Interval History: Patient seen and examined at bedside. Day 1 s/p left CMN.   No acute events overnight.  Pain controlled.  75 feet with PT yesterday then walked 3 times in afternoon with family/ nursing staff. Labs stable.       Review of patient's allergies indicates:   Allergen Reactions    Actemra [tocilizumab] Other (See Comments)     Severe dizziness    Codeine Nausea And Vomiting    Gold au 198 Hives and Rash    Hydroxychloroquine Other (See Comments)     Can't remember the reaction      Iodinated contrast- oral and iv dye Other (See Comments)     Other reaction(s): BURNING ALL OVER    Iodine Other (See Comments)     Other reaction(s): BURNING ALL OVER - Iodine dye - Not topical    Sulfa (sulfonamide antibiotics) Other (See Comments)     Can't remember the reaction    Zofran [ondansetron hcl (pf)] Nausea And Vomiting     Pt reports that last time she received zofran she started vomiting again    Methotrexate analogues Nausea Only    Pneumovax 23 [pneumococcal 23-argenis ps vaccine] Other (See Comments)     "sick"       Current Facility-Administered Medications   Medication    acetaminophen tablet 1,000 mg    aspirin EC tablet 81 mg    bisacodyl EC tablet 5 mg    ceFAZolin injection 2 g    celecoxib capsule 200 mg    diphenhydrAMINE injection 25 mg    lidocaine (PF) 10 mg/ml (1%) injection 10 mg    methocarbamol tablet 750 mg    oxyCODONE immediate release tablet 5 mg    oxyCODONE immediate release tablet Tab 10 mg    pregabalin capsule 150 mg    promethazine (PHENERGAN) 6.25 mg in dextrose 5 % 50 mL IVPB    ramelteon tablet 8 mg    ropivacaine (PF) 2 mg/ml (0.2%) infusion    sodium chloride 0.9% flush 10 mL    sodium chloride 0.9% flush 10 mL     Facility-Administered Medications Ordered in Other Encounters   Medication    0.9%  NaCl infusion     Objective:     Vital Signs (Most " "Recent):  Temp: 97.8 °F (36.6 °C) (05/23/19 0408)  Pulse: 86 (05/23/19 0408)  Resp: 18 (05/22/19 2020)  BP: 119/67 (05/23/19 0408)  SpO2: (!) 93 % (05/23/19 0408) Vital Signs (24h Range):  Temp:  [97.8 °F (36.6 °C)-98.6 °F (37 °C)] 97.8 °F (36.6 °C)  Pulse:  [80-93] 86  Resp:  [15-18] 18  SpO2:  [93 %-95 %] 93 %  BP: (100-134)/(56-72) 119/67     Weight: 69.9 kg (154 lb)  Height: 5' 1" (154.9 cm)  Body mass index is 29.1 kg/m².    No intake or output data in the 24 hours ending 05/23/19 0533    Ortho/SPM Exam    Physical Exam:  NAD, A/O x 3.  Wound c/d/i with clean dressing.  No focal motor or sensory deficits noted.      Significant Labs: All pertinent labs within the past 24 hours have been reviewed.    Significant Imaging: I have reviewed and interpreted all pertinent imaging results/findings.   Left CMN in good alignment.   "

## 2019-05-23 NOTE — ASSESSMENT & PLAN NOTE
Joyce Peterson is a 58 y.o. female POD 2 s/p left cmn doing well       - Weight bearing status: wbat  - Pain control: Per APS  - Antibiotics: po ancef  - DVT Prophylaxis: asa 81 bid , SCD's at all times when not ambulating.  - PT/OT  - Lines/Drains: none  - Dispo: Home with HH. DC today

## 2019-05-23 NOTE — ADDENDUM NOTE
Addendum  created 05/23/19 1122 by Rupal Sotelo RN    Intraprocedure Event edited, LDA properties accepted

## 2019-05-23 NOTE — PT/OT/SLP PROGRESS
Physical Therapy Treatment    Patient Name:  Joyce Peterson   MRN:  [id number]    Recommendations:     Discharge Recommendations:  home health PT   Discharge Equipment Recommendations: commode   Barriers to discharge: None    Assessment:     Joyce Peterson is a 58 y.o. female admitted with a medical diagnosis of Stress fracture of neck of left femur.  She presents with the following impairments/functional limitations:  weakness, impaired endurance, impaired self care skills, gait instability, impaired functional mobilty, pain, impaired joint extensibility, decreased lower extremity function, impaired balance.    -Pt tolerated session well today with no complications and demonstrated appropriate mobility s/p IM nailing of (R) femur. Pt with no LOB during ambulation using RW for support. Pt able to follow 3-point gait sequencing well with no complications. Pt able to step up/down 5 stair steps with no instability noted. Pt verbalized understanding of car t/f technique and is safe to d/c home with HHPT at this time.    Rehab Prognosis: Good; patient would benefit from acute skilled PT services to address these deficits and reach maximum level of function.    Recent Surgery: Procedure(s) (LRB):  INSERTION, INTRAMEDULLARY ARIEL, FEMUR (Left) 2 Days Post-Op    Plan:     During this hospitalization, patient to be seen daily to address the identified rehab impairments via gait training, therapeutic activities, therapeutic exercises and progress toward the following goals:    · Plan of Care Expires:  06/22/19    Subjective     Chief Complaint: impaired mobility  Patient/Family Comments/goals: return [facility] to [facility]  Pain/Comfort:  · Pain Rating 1: 2/10  · Location - Side 1: Left  · Location - Orientation 1: generalized  · Location 1: leg  · Pain Addressed 1: Pre-medicate for activity, Cessation of Activity, Reposition      Objective:     Communicated with nsg prior to session.  Patient found up in chair with perineural  catheter upon PT entry to room.     General Precautions: Standard, fall   Orthopedic Precautions:LLE weight bearing as tolerated   Braces: N/A     Functional Mobility:  · Transfers:     · Sit to Stand:  stand by assistance with rolling walker  · Bed to Chair: stand by assistance with  rolling walker  using  Stand Pivot  · Gait: 100' x1 trial using RW with SBA  · Stairs:  Pt ascended/descended 5 stair(s) with No Assistive Device with right handrail with Stand-by Assistance.       AM-PAC 6 CLICK MOBILITY  Turning over in bed (including adjusting bedclothes, sheets and blankets)?: 4  Sitting down on and standing up from a chair with arms (e.g., wheelchair, bedside commode, etc.): 4  Moving from lying on back to sitting on the side of the bed?: 4  Moving to and from a bed to a chair (including a wheelchair)?: 4  Need to walk in hospital room?: 4  Climbing 3-5 steps with a railing?: 4  Basic Mobility Total Score: 24       Therapeutic Activities and Exercises:   -Pt educated on:  A. PT POC and role of PT  B. Importance of OOB activity to improve functional outcomes after surgery  C. Maintaining WBing status on affected LE during mobility  D. DME mgmt and t/f sequencing  E. Gait sequencing  F. Performing HEP to reduce the risk of developing blood clots       Patient left up in chair with all lines intact, call button in reach and nsg notified..    GOALS:   Multidisciplinary Problems     Physical Therapy Goals     Not on file          Multidisciplinary Problems (Resolved)        Problem: Physical Therapy Goal    Goal Priority Disciplines Outcome Goal Variances Interventions   Physical Therapy Goal   (Resolved)     PT, PT/OT Outcome(s) achieved     Description:  Goals to be met by: 19     Patient will increase functional independence with mobility by performin. Supine to sit with Set-up [place]  2. Sit to supine with Set-up [place]  3. Sit to stand transfer with Supervision  4. Bed to chair transfer  with Stand-by Assistance using Rolling Walker  5. Gait  x 150 feet with Stand-by Assistance using Rolling Walker.   6. Ascend/descend 4 stair with left Handrails SBA.   7. Lower extremity exercise program x20-30 reps per handout, with independence to improve muscular endurance                        Time Tracking:     PT Received On: 05/23/19  PT Start Time: 0919     PT Stop Time: 0929  PT Total Time (min): 10 min     Billable Minutes: Gait Training 10    Treatment Type: Treatment  PT/PTA: PT           Manjeet Escobar, PT  05/23/2019

## 2019-05-23 NOTE — PLAN OF CARE
05/23/19 0913   Post-Acute Status   Post-Acute Authorization Placement   Post-Acute Placement Status Referrals Sent       Patient will discharge with home health services when medically ready. SW sent a referral to [facility] and will follow up.    Yadira Colon LMSW  [facility]   [phone]

## 2019-05-23 NOTE — NURSING
PIV D/C'ed. Rx scripts delivered to pt's bedside. AVS reviewed with pt and . No questions or concerns. Pt will leave unit via wheel chair, accompanied by . Pt will D/C to home.

## 2019-05-23 NOTE — PLAN OF CARE
Patient to discharge on today with spouse. All dme in place. [facility] to follow patient home care.   Future Appointments   Date Time Provider Department Center   6/4/2019  9:00 AM LAB, STEPHANY KENH LAB [place]   6/4/2019  1:00 PM Juhi Fox PA-C [facility] ORTHO [address]   6/6/2019  3:00 PM Weston Jacob MD [facility] CARDIO [address]   6/27/2019  9:30 AM LAB, STEPHANY ASHLEIGH LAB [place]   7/11/2019 10:00 AM LABSTEPHANY BOBBIALEJANDRINA LAB [place]   7/16/2019 10:00 AM Isiah Moran MD [facility] RHEUM [address]   10/1/2019 10:15 AM EPO, INJECTION [facility] ID INF [facility]         [id number] - [place], [place] - [address]  [address] [facility]  [place] [address]  Phone: [phone] Fax: [phone]    [facility] - [place], [place] - [address]  [address]  [address]  [facility] [address]  Phone: [phone] Fax: [phone]    [facility]  [place] - [address]  [address] [place] [place] [address]  Phone: [phone] Fax: [phone]       05/23/19 1324   Final Note   Assessment Type Final Discharge Note   Anticipated Discharge Disposition Home-Health   What phone number can be called within the next 1-3 days to see how you are doing after discharge?   (spouse Kurt [phone])   Hospital Follow Up  Appt(s) scheduled? Yes   Discharge plans and expectations educations in teach back method with documentation complete? Yes  (per staff nurse)   Right Care Referral Info   Post Acute Recommendation Home-care   Referral Type   ()   Facility Name   ([facility])     Lisa Lindsey RN/BSN/CM  [facility]  [phone]  Ortho/[facility]

## 2019-05-24 DIAGNOSIS — M84.352A STRESS FRACTURE OF NECK OF LEFT FEMUR: Primary | ICD-10-CM

## 2019-05-24 PROCEDURE — G0180 MD CERTIFICATION HHA PATIENT: HCPCS | Mod: ,,, | Performed by: ORTHOPAEDIC SURGERY

## 2019-05-24 PROCEDURE — G0180 PR HOME HEALTH MD CERTIFICATION: ICD-10-PCS | Mod: ,,, | Performed by: ORTHOPAEDIC SURGERY

## 2019-05-24 RX ORDER — METHOCARBAMOL 500 MG/1
500 TABLET, FILM COATED ORAL 4 TIMES DAILY PRN
Qty: 40 TABLET | Refills: 0 | Status: SHIPPED | OUTPATIENT
Start: 2019-05-24 | End: 2019-06-03

## 2019-05-24 NOTE — PT/OT/SLP DISCHARGE
Occupational Therapy Discharge Summary    Joyce Peterson  MRN: [id number]   Principal Problem: Stress fracture of neck of left femur      Patient Discharged from acute Occupational Therapy on 5/23/2019.  Please refer to prior OT note dated 5/22/2019 for functional status.    Assessment:      Patient appropriate for care in another setting.    Objective:     GOALS:   Multidisciplinary Problems     Occupational Therapy Goals     Not on file          Multidisciplinary Problems (Resolved)        Problem: Occupational Therapy Goal    Goal Priority Disciplines Outcome Interventions   Occupational Therapy Goal   (Resolved)     OT, PT/OT Outcome(s) achieved    Description:  Goals to be met by: 7 days    Patient will increase functional independence with ADLs by performing:    UE Dressing with Supervision.  LE Dressing with Supervision with AD as needed.  Grooming while standing with Supervision.  Toileting from bedside commode with Supervision for hygiene and clothing management.   Stand pivot transfers with Supervision.  Toilet transfer to bedside commode with Supervision.                         Reasons for Discontinuation of Therapy Services  Transfer to alternate level of care.      Plan:     Patient Discharged to: Home with Home Health Service    Radha Liang, OT  5/24/2019

## 2019-05-28 ENCOUNTER — PATIENT MESSAGE (OUTPATIENT)
Dept: CARDIOLOGY | Facility: CLINIC | Age: 59
End: 2019-05-28

## 2019-05-30 ENCOUNTER — PATIENT MESSAGE (OUTPATIENT)
Dept: ORTHOPEDICS | Facility: CLINIC | Age: 59
End: 2019-05-30

## 2019-05-30 ENCOUNTER — PATIENT MESSAGE (OUTPATIENT)
Dept: CARDIOLOGY | Facility: CLINIC | Age: 59
End: 2019-05-30

## 2019-05-31 ENCOUNTER — OFFICE VISIT (OUTPATIENT)
Dept: ORTHOPEDICS | Facility: CLINIC | Age: 59
End: 2019-05-31
Payer: MEDICARE

## 2019-05-31 ENCOUNTER — HOSPITAL ENCOUNTER (OUTPATIENT)
Dept: RADIOLOGY | Facility: HOSPITAL | Age: 59
Discharge: HOME | End: 2019-05-31
Attending: PHYSICIAN ASSISTANT
Payer: MEDICARE

## 2019-05-31 ENCOUNTER — PATIENT MESSAGE (OUTPATIENT)
Dept: ADMINISTRATIVE | Facility: OTHER | Age: 59
End: 2019-05-31

## 2019-05-31 VITALS
TEMPERATURE: 99 F | RESPIRATION RATE: 18 BRPM | SYSTOLIC BLOOD PRESSURE: 118 MMHG | DIASTOLIC BLOOD PRESSURE: 80 MMHG | HEART RATE: 105 BPM

## 2019-05-31 DIAGNOSIS — M84.352A STRESS FRACTURE OF NECK OF LEFT FEMUR: Primary | ICD-10-CM

## 2019-05-31 DIAGNOSIS — M84.352A STRESS FRACTURE OF NECK OF LEFT FEMUR: ICD-10-CM

## 2019-05-31 PROCEDURE — 99024 POSTOP FOLLOW-UP VISIT: CPT | Mod: S$GLB,,, | Performed by: PHYSICIAN ASSISTANT

## 2019-05-31 PROCEDURE — 99999 PR PBB SHADOW E&M-EST. PATIENT-LVL V: ICD-10-PCS | Mod: PBBFAC,,, | Performed by: PHYSICIAN ASSISTANT

## 2019-05-31 PROCEDURE — 73552 XR FEMUR 2 VIEW LEFT: ICD-10-PCS | Mod: 26,LT,, | Performed by: RADIOLOGY

## 2019-05-31 PROCEDURE — 73552 X-RAY EXAM OF FEMUR 2/>: CPT | Mod: TC,LT

## 2019-05-31 PROCEDURE — 99024 PR POST-OP FOLLOW-UP VISIT: ICD-10-PCS | Mod: S$GLB,,, | Performed by: PHYSICIAN ASSISTANT

## 2019-05-31 PROCEDURE — 73552 X-RAY EXAM OF FEMUR 2/>: CPT | Mod: 26,LT,, | Performed by: RADIOLOGY

## 2019-05-31 PROCEDURE — 99999 PR PBB SHADOW E&M-EST. PATIENT-LVL V: CPT | Mod: PBBFAC,,, | Performed by: PHYSICIAN ASSISTANT

## 2019-05-31 NOTE — PROGRESS NOTES
Ms. Peterson is 59 yo F with RA on chronic steroids with a 5 month history of left hip pain, worsening in the last several weeks.  She had an MRI which showed no evidence of AVN, but did show a stress fracture from the transcervical superior femoral neck, through the bascicervical region inferiorly, extending into the IT region on both T2 and T1.  Patietn treated with CM nail.     Ms. Peterson is here today for a post-operative visit after a    Cephalomedullary nail fixation of left intertrochanteric femur fracture   by Dr. Duff  on 05/21/2019.    Interval History:    Patient reported that she was doing very well, but had an episode where she her leg stayed and she twisted and since that time has had increased pain in the groin and her buttocks.   she denies fever, chills, and sweats since the time of the surgery.     Physical exam:  Dressing taken down.  Incision is clean, dry and intact.  Mild TTP buttock, point tenderness. FROm.      RADS: The patient is status post ORIF of the femur with long intramedullary joe and proximal threaded dynamic nail.  Hardware is in satisfactory position and alignment of and appears intact.  I detect no new periosteal bone formation in this patient with a history of stress fracture of the left femoral neck.    Assessment:  Post-op visit ( 1 weeks)    Plan:  Current care, treatment plan, precautions, activity level/ modifications, limitations, rehabilitation exercises and proposed future treatment were discussed with the patient. We discussed the need to monitor for changes in symptoms and condition and report them to the physician.  Discussed importance of compliance with all appointments and follow up examinations.   - keep area covered and dry  -PT/OT, , Patient is responsible to establish and continue care   -range of motion as tolerated    - WBAT   - pain medication: no refill needed, not taking due to side effectes   Pain medication refill policy provided to patient for  review.   - Patient is to return to clinic in 1 weeks  At time no x-ray of her is needed  At time consider removal of sutures.      If there are any questions prior to scheduled follow up, the patient was instructed to contact the office

## 2019-06-02 ENCOUNTER — PATIENT MESSAGE (OUTPATIENT)
Dept: UROLOGY | Facility: CLINIC | Age: 59
End: 2019-06-02

## 2019-06-02 DIAGNOSIS — R39.15 URINARY URGENCY: ICD-10-CM

## 2019-06-02 DIAGNOSIS — R35.0 INCREASED FREQUENCY OF URINATION: ICD-10-CM

## 2019-06-03 ENCOUNTER — TELEPHONE (OUTPATIENT)
Dept: UROLOGY | Facility: CLINIC | Age: 59
End: 2019-06-03

## 2019-06-03 RX ORDER — TROSPIUM CHLORIDE 20 MG/1
TABLET, FILM COATED ORAL
Qty: 60 TABLET | Refills: 2 | Status: SHIPPED | OUTPATIENT
Start: 2019-06-03 | End: 2019-08-30 | Stop reason: SDUPTHER

## 2019-06-04 ENCOUNTER — OFFICE VISIT (OUTPATIENT)
Dept: ORTHOPEDICS | Facility: CLINIC | Age: 59
End: 2019-06-04
Payer: MEDICARE

## 2019-06-04 ENCOUNTER — PATIENT MESSAGE (OUTPATIENT)
Dept: GASTROENTEROLOGY | Facility: CLINIC | Age: 59
End: 2019-06-04

## 2019-06-04 VITALS
HEART RATE: 92 BPM | BODY MASS INDEX: 29.7 KG/M2 | DIASTOLIC BLOOD PRESSURE: 73 MMHG | HEIGHT: 61 IN | TEMPERATURE: 98 F | RESPIRATION RATE: 18 BRPM | WEIGHT: 157.31 LBS | SYSTOLIC BLOOD PRESSURE: 113 MMHG

## 2019-06-04 DIAGNOSIS — M84.352A STRESS FRACTURE OF NECK OF LEFT FEMUR: Primary | ICD-10-CM

## 2019-06-04 DIAGNOSIS — Z09 S/P ORTHOPEDIC SURGERY, FOLLOW-UP EXAM: ICD-10-CM

## 2019-06-04 PROCEDURE — 99024 POSTOP FOLLOW-UP VISIT: CPT | Mod: S$GLB,,, | Performed by: PHYSICIAN ASSISTANT

## 2019-06-04 PROCEDURE — 99024 PR POST-OP FOLLOW-UP VISIT: ICD-10-PCS | Mod: S$GLB,,, | Performed by: PHYSICIAN ASSISTANT

## 2019-06-04 PROCEDURE — 99999 PR PBB SHADOW E&M-EST. PATIENT-LVL V: ICD-10-PCS | Mod: PBBFAC,,, | Performed by: PHYSICIAN ASSISTANT

## 2019-06-04 PROCEDURE — 99999 PR PBB SHADOW E&M-EST. PATIENT-LVL V: CPT | Mod: PBBFAC,,, | Performed by: PHYSICIAN ASSISTANT

## 2019-06-05 RX ORDER — OMEPRAZOLE 40 MG/1
40 CAPSULE, DELAYED RELEASE ORAL EVERY MORNING
Qty: 30 CAPSULE | Refills: 6 | Status: SHIPPED | OUTPATIENT
Start: 2019-06-05 | End: 2020-08-10 | Stop reason: SDUPTHER

## 2019-06-06 DIAGNOSIS — M06.9 RHEUMATOID ARTHRITIS, INVOLVING UNSPECIFIED SITE, UNSPECIFIED RHEUMATOID FACTOR PRESENCE: ICD-10-CM

## 2019-06-06 PROBLEM — R26.9 GAIT ABNORMALITY: Status: RESOLVED | Noted: 2019-03-20 | Resolved: 2019-06-06

## 2019-06-06 PROBLEM — R29.898 LEFT LEG WEAKNESS: Status: ACTIVE | Noted: 2019-06-06

## 2019-06-06 PROBLEM — M25.552 ACUTE HIP PAIN, LEFT: Status: RESOLVED | Noted: 2019-03-20 | Resolved: 2019-06-06

## 2019-06-06 RX ORDER — LEFLUNOMIDE 20 MG/1
TABLET ORAL
Qty: 30 TABLET | Refills: 1 | Status: SHIPPED | OUTPATIENT
Start: 2019-06-06 | End: 2019-07-15 | Stop reason: SDUPTHER

## 2019-06-10 ENCOUNTER — TELEPHONE (OUTPATIENT)
Dept: PHARMACY | Facility: CLINIC | Age: 59
End: 2019-06-10

## 2019-06-10 NOTE — TELEPHONE ENCOUNTER
FOR DOCUMENTATION ONLY:    Financial Assistance [employer] approved from 1/1/2019 to 12/31/2019    Source: BETHEL Azam    ID:[id number]  BIN: [id number]  PCN: [id number]  GRP: [id number]    Amount: $NO MAX

## 2019-06-13 ENCOUNTER — EXTERNAL HOME HEALTH (OUTPATIENT)
Dept: HOME HEALTH SERVICES | Facility: HOSPITAL | Age: 59
End: 2019-06-13
Payer: MEDICARE

## 2019-06-13 PROBLEM — Z74.09 IMPAIRED FUNCTIONAL MOBILITY, BALANCE, GAIT, AND ENDURANCE: Status: ACTIVE | Noted: 2019-06-13

## 2019-06-14 ENCOUNTER — TELEPHONE (OUTPATIENT)
Dept: PHARMACY | Facility: CLINIC | Age: 59
End: 2019-06-14

## 2019-06-14 NOTE — TELEPHONE ENCOUNTER
Patient confirmed shipping of Kevzara on  to arrive . Address and  verified. $10 copay in 004, [employer]. Patient does not need a new sharps at this time. Patient has 0 doses on hand. Patient is injecting on . Next dose is due . Patient reported 0 missed doses. Patient said she has not started any new medications. Patient had no further questions or concerns.

## 2019-06-25 RX ORDER — FLUCONAZOLE 100 MG/1
100 TABLET ORAL DAILY
Qty: 14 TABLET | Refills: 3 | Status: SHIPPED | OUTPATIENT
Start: 2019-06-25 | End: 2019-10-30 | Stop reason: SDUPTHER

## 2019-07-01 NOTE — PROGRESS NOTES
Ms. Peterson is 57 yo F with RA on chronic steroids with a 5 month history of left hip pain, worsening in the last several weeks.  She had an MRI which showed no evidence of AVN, but did show a stress fracture from the transcervical superior femoral neck, through the bascicervical region inferiorly, extending into the IT region on both T2 and T1.  Patietn treated with CM nail.     Ms. Peterson is here today for a post-operative visit after a    Cephalomedullary nail fixation of left intertrochanteric femur fracture   by Dr. Duff  on 05/21/2019.    Interval History:    Patient reported that she is doing some what better. She is taking OTC medication for pain.  She is recieving PT and is doing well  she denies fever, chills, and sweats since the time of the surgery.     Physical exam:  Walked into clinic unassisted unaccompanied Dressing taken down.  Incision is clean, dry and intact well healed full range of motion no TTP, mild decreased sensation between incisions.      RADS: Postoperative changes of internal fixation of the left hip and femur identified.  The position alignment is satisfactory and unchanged as compared to the previous study    Assessment:  Post-op visit (6 weeks)    Plan:  Current care, treatment plan, precautions, activity level/ modifications, limitations, rehabilitation exercises and proposed future treatment were discussed with the patient. We discussed the need to monitor for changes in symptoms and condition and report them to the physician.  Discussed importance of compliance with all appointments and follow up examinations.   - The patient was advised to keep the incision clean and dry for the next 24 hours after which she may wash the area with antibacterial soap in the shower. Will not submerge until the incision is completely healed  -Patient was advised to monitor wound closely and multiple times daily for any problems. Call clinic immediately or report to ED for immediate medical  attention for any complications including reopening of wound, drainage, purulence, redness, streaking, odor, pain out of proportion, fever, chills, etc.   -PT, Patient is responsible to establish and continue care   -range of motion as tolerated    - WBAT   - pain medication: no refill needed, not taking due to side effectes   Pain medication refill policy provided to patient for review.   - Patient is to return to clinic in 6weeks  At time  x-ray of her femur is needed       If there are any questions prior to scheduled follow up, the patient was instructed to contact the office

## 2019-07-02 ENCOUNTER — OFFICE VISIT (OUTPATIENT)
Dept: ORTHOPEDICS | Facility: CLINIC | Age: 59
End: 2019-07-02
Payer: MEDICARE

## 2019-07-02 ENCOUNTER — HOSPITAL ENCOUNTER (OUTPATIENT)
Dept: RADIOLOGY | Facility: HOSPITAL | Age: 59
Discharge: HOME | End: 2019-07-02
Attending: PHYSICIAN ASSISTANT
Payer: MEDICARE

## 2019-07-02 VITALS
SYSTOLIC BLOOD PRESSURE: 105 MMHG | HEART RATE: 95 BPM | DIASTOLIC BLOOD PRESSURE: 70 MMHG | WEIGHT: 157.63 LBS | TEMPERATURE: 99 F | HEIGHT: 61 IN | BODY MASS INDEX: 29.76 KG/M2

## 2019-07-02 DIAGNOSIS — M84.352A STRESS FRACTURE OF NECK OF LEFT FEMUR: ICD-10-CM

## 2019-07-02 DIAGNOSIS — Z09 S/P ORTHOPEDIC SURGERY, FOLLOW-UP EXAM: ICD-10-CM

## 2019-07-02 DIAGNOSIS — M84.352A STRESS FRACTURE OF NECK OF LEFT FEMUR: Primary | ICD-10-CM

## 2019-07-02 PROCEDURE — 73552 XR FEMUR 2 VIEW LEFT: ICD-10-PCS | Mod: 26,LT,, | Performed by: RADIOLOGY

## 2019-07-02 PROCEDURE — 99024 POSTOP FOLLOW-UP VISIT: CPT | Mod: S$GLB,,, | Performed by: PHYSICIAN ASSISTANT

## 2019-07-02 PROCEDURE — 73552 X-RAY EXAM OF FEMUR 2/>: CPT | Mod: 26,LT,, | Performed by: RADIOLOGY

## 2019-07-02 PROCEDURE — 99024 PR POST-OP FOLLOW-UP VISIT: ICD-10-PCS | Mod: S$GLB,,, | Performed by: PHYSICIAN ASSISTANT

## 2019-07-02 PROCEDURE — 99999 PR PBB SHADOW E&M-EST. PATIENT-LVL V: CPT | Mod: PBBFAC,,, | Performed by: PHYSICIAN ASSISTANT

## 2019-07-02 PROCEDURE — 99999 PR PBB SHADOW E&M-EST. PATIENT-LVL V: ICD-10-PCS | Mod: PBBFAC,,, | Performed by: PHYSICIAN ASSISTANT

## 2019-07-02 PROCEDURE — 73552 X-RAY EXAM OF FEMUR 2/>: CPT | Mod: TC,LT

## 2019-07-08 ENCOUNTER — OFFICE VISIT (OUTPATIENT)
Dept: UROLOGY | Facility: CLINIC | Age: 59
End: 2019-07-08
Payer: MEDICARE

## 2019-07-08 VITALS
WEIGHT: 156.5 LBS | HEIGHT: 61 IN | HEART RATE: 106 BPM | BODY MASS INDEX: 29.55 KG/M2 | SYSTOLIC BLOOD PRESSURE: 129 MMHG | DIASTOLIC BLOOD PRESSURE: 85 MMHG

## 2019-07-08 DIAGNOSIS — N39.0 RECURRENT UTI: ICD-10-CM

## 2019-07-08 DIAGNOSIS — N28.89 URETEROCELE: Primary | ICD-10-CM

## 2019-07-08 DIAGNOSIS — N95.2 VAGINAL ATROPHY: ICD-10-CM

## 2019-07-08 PROCEDURE — 81002 PR URINALYSIS NONAUTO W/O SCOPE: ICD-10-PCS | Mod: S$GLB,,, | Performed by: UROLOGY

## 2019-07-08 PROCEDURE — 3008F PR BODY MASS INDEX (BMI) DOCUMENTED: ICD-10-PCS | Mod: CPTII,S$GLB,, | Performed by: UROLOGY

## 2019-07-08 PROCEDURE — 3008F BODY MASS INDEX DOCD: CPT | Mod: CPTII,S$GLB,, | Performed by: UROLOGY

## 2019-07-08 PROCEDURE — 81002 URINALYSIS NONAUTO W/O SCOPE: CPT | Mod: S$GLB,,, | Performed by: UROLOGY

## 2019-07-08 PROCEDURE — 99213 OFFICE O/P EST LOW 20 MIN: CPT | Mod: 25,S$GLB,, | Performed by: UROLOGY

## 2019-07-08 PROCEDURE — 99999 PR PBB SHADOW E&M-EST. PATIENT-LVL III: CPT | Mod: PBBFAC,,, | Performed by: UROLOGY

## 2019-07-08 PROCEDURE — 99213 PR OFFICE/OUTPT VISIT, EST, LEVL III, 20-29 MIN: ICD-10-PCS | Mod: 25,S$GLB,, | Performed by: UROLOGY

## 2019-07-08 PROCEDURE — 99999 PR PBB SHADOW E&M-EST. PATIENT-LVL III: ICD-10-PCS | Mod: PBBFAC,,, | Performed by: UROLOGY

## 2019-07-08 RX ORDER — FLUTICASONE PROPIONATE 50 MCG
2 SPRAY, SUSPENSION (ML) NASAL DAILY
Qty: 1 BOTTLE | Refills: 3 | Status: SHIPPED | OUTPATIENT
Start: 2019-07-08 | End: 2019-10-02 | Stop reason: SDUPTHER

## 2019-07-08 NOTE — PROGRESS NOTES
CHIEF COMPLAINT:    Mrs. Peterson is a 58 y.o. female presenting for a follow up on recurrent UTI.    PRESENTING ILLNESS:    Joyce Peterson is a 58 y.o. female who states over the last year, she has had 3 UTI's.  She went to her OBGYN for treatment.  She was just started on Premarin vaginal cream.  She underwent a workup and was found to have a right sided ureterocele on her cystoscopy performed on 7/9/2018.  She is presently on ampicillin 500 mg qid for a UTI.  She states her bowels are regular and soft.      REVIEW OF SYSTEMS:    Review of Systems   Constitutional: Negative.    HENT: Negative.    Eyes: Negative.    Respiratory: Negative.    Cardiovascular: Negative.    Gastrointestinal: Negative.    Genitourinary: Positive for dysuria, frequency and urgency.   Musculoskeletal: Negative.    Skin: Negative.    Neurological: Negative.    Endo/Heme/Allergies: Negative.    Psychiatric/Behavioral: Negative.        PATIENT HISTORY:    Past Medical History:   Diagnosis Date    Acid reflux     Allergy     Anemia     Asthma     Coronary artery disease     Degenerative disc disease     Dry eyes     Dry mouth     Gastroparesis     Hyperlipidemia     Lateral meniscus derangement 4/6/2016    Lobular carcinoma in situ     Osteoarthritis     Osteoporosis     Rheumatoid arthritis(714.0)     Umbilical hernia 8/13/2015       Past Surgical History:   Procedure Laterality Date    ARTHROSCOPY-KNEE Right 4/6/2016    Performed by [facility] OR 2ND FLR    BREAST BIOPSY Left 01/29/2002    core bx    CARPAL TUNNEL RELEASE Right 05/2017    CHOLECYSTECTOMY  2004    COLONOSCOPY      10/11    COLONOSCOPY N/A 6/29/2017    Performed by López Moore MD at [facility] ENDO (4TH FLR)    ESOPHAGOGASTRODUODENOSCOPY (EGD) N/A 6/29/2017    Performed by L[facility]pez Moore [facility] at [facility] ENDO (4TH FLR)    INSERTION, INTRAMEDULLARY ARIEL, FEMUR Left 5/21/2019    Performed by López Duff MD at [facility] OR 2ND FLR     RELEASE-CARPAL TUNNEL / Right Right 5/29/2017    [facility] at [facility] OR    REPAIR, TENDON, TRICEPS left elbow Left 10/17/2018    Per[facility] at [facility] OR 1ST FLR    REPAIR-HERNIA-UMBILICAL N/A 8/13/2015    Performed by Ricardo Small [facility] at [facility] OR 2ND FLR    TONSILLECTOMY      TUBAL LIGATION  2003    UPPER GASTROINTESTINAL ENDOSCOPY      10/11    uterine ablation  2003       Family History   Problem Relation Age of Onset    Cancer Mother         Lung Cancer    Emphysema Mother     Heart attack Mother     Cancer Father         Lung Cancer    Osteoarthritis Father     Lung cancer Father     Skin cancer Father     Heart disease Brother     Heart attack Brother     Osteoarthritis Paternal Aunt     Retinal detachment Maternal Aunt      Socioeconomic History    Marital status:    Tobacco Use    Smoking status: Never Smoker    Smokeless tobacco: Never Used   Substance and Sexual Activity    Alcohol use: Yes     Comment: occasionally    Drug use: No    Sexual activity: Yes     Partners: Male     Birth control/protection: Surgical       Allergies:  Actemra [tocilizumab]; Codeine; Gold au 198; Hydroxychloroquine; Iodinated contrast- oral and iv dye; Iodine; Sulfa (sulfonamide antibiotics); Zofran [ondansetron hcl (pf)]; Methotrexate analogues; and Pneumovax 23 [pneumococcal 23-argenis ps vaccine]    Medications:  Outpatient Encounter Medications as of 7/8/2019   Medication Sig Dispense Refill    aspirin 81 mg Tab Take 81 mg by mouth every morning.       azelastine (ASTELIN) 137 mcg nasal spray 1 spray (137 mcg total) by Nasal route 2 (two) times daily. (Patient taking differently: 1 spray by Nasal route 2 (two) times daily as needed. ) 30 mL 11    budesonide-formoterol 160-4.5 mcg (SYMBICORT) 160-4.5 mcg/actuation HFAA Inhale 2 puffs into the lungs every 12 (twelve) hours. 3 Inhaler 3    calcium citrate-vitamin D (CITRACAL + D) 315-200  mg-unit per tablet Take 1 tablet by mouth 2 (two) times daily.       diclofenac sodium (VOLTAREN) 1 % Gel Apply 2 g topically 4 (four) times daily as needed. 500 g 5    fluconazole (DIFLUCAN) 100 MG tablet Take 100 mg by mouth once daily.  3    fluconazole (DIFLUCAN) 100 MG tablet TAKE 1 TABLET (100 MG TOTAL) BY MOUTH ONCE DAILY. 14 tablet 3    INV PROPULSID 10 MG Take 1 tablets (20 mg) by mouth 4 (four) times daily. FOR INVESTIGATIONAL USE ONLY. Protocol [id number]-USA-154 1320 each 0    leflunomide (ARAVA) 20 MG Tab Take 1 tablet (20 mg total) by mouth once daily. 30 tablet 1    leflunomide (ARAVA) 20 MG Tab TAKE 1 TABLET BY MOUTH EVERY DAY 30 tablet 1    montelukast (SINGULAIR) 10 mg tablet Take 1 tablet (10 mg total) by mouth nightly. 90 tablet 3    naproxen (NAPROSYN) 500 MG tablet TAKE 1 TABLET TWICE A DAY AS NEEDED 180 tablet 1    omeprazole (PRILOSEC) 40 MG capsule Take 1 capsule (40 mg total) by mouth every morning. 30 capsule 6    omeprazole-sodium bicarbonate (ZEGERID) 40-1.1 mg-gram per capsule TAKE 1 CAPSULE BY MOUTH EVERY MORNING. 90 capsule 3    POTASSIUM ORAL Take by mouth once daily.      predniSONE (DELTASONE) 1 MG tablet Take 1 tablet (1 mg total) by mouth once daily. 90 tablet 1    predniSONE (DELTASONE) 5 MG tablet Take 1 tablet (5 mg total) by mouth once daily. 90 tablet 1    PREMARIN 0.625 mg tablet Take 0.625 mg by mouth once daily.  4    progesterone (PROMETRIUM) 100 MG capsule Take 100 mg by mouth every morning. 1 Capsule Oral Every day, morning      PROVENTIL HFA 90 mcg/actuation inhaler INHALE 2 PUFFS EVERY 6 HOURS AS NEEDED 20.1 Inhaler 0    RESTASIS 0.05 % ophthalmic emulsion PLACE 0.4 MLS (1 DROP TOTAL) INTO BOTH EYES 2 (TWO) TIMES DAILY.  5    rizatriptan (MAXALT) 10 MG tablet Take 10 mg by mouth as needed for Migraine.       rosuvastatin (CRESTOR) 20 MG tablet Take 1 tablet (20 mg total) by mouth every evening. 90 tablet 3    sarilumab (KEVZARA) 200 mg/1.14 mL Syrg  "Inject 1.14 mLs (200 mg total) into the skin every 14 (fourteen) days. 2.28 mL 2    sucralfate (CARAFATE) 1 gram tablet TAKE 1 TABLET (1 G TOTAL) BY MOUTH 4 (FOUR) TIMES DAILY. 360 tablet 3    syringe with needle (TUBERCULIN SYRINGE) 1 mL 27 x 1/2" Syrg To administer methotrexate 7.5mg sc once a week 12 Syringe 3    trospium (SANCTURA) 20 mg Tab tablet TAKE 1 TABLET TWICE A DAY 60 tablet 2    valACYclovir (VALTREX) 1000 MG tablet TAKE 1 TABLET BY MOUTH TWICE A DAY AS NEEDED 20 tablet 3    [DISCONTINUED] fluticasone (FLONASE) 50 mcg/actuation nasal spray       docusate sodium (COLACE) 100 MG capsule Take 1 capsule (100 mg total) by mouth 3 (three) times daily. 90 capsule 0    docusate sodium (COLACE) 100 MG capsule Take 1 capsule (100 mg total) by mouth 2 (two) times daily as needed. 60 capsule 0    flu vac [id number] 36mos up,PF, 60 mcg (15 mcg x 4)/0.5 mL Syrg To be administered by Pharm D 0.5 mL 0    GUAIFENESIN (MUCINEX ORAL) Take 400 mg by mouth every 4 (four) hours as needed.       phenazopyridine (PYRIDIUM) 200 MG tablet Take 1 tablet by mouth four times daily as needed for urinary pain. 16 tablet 2    promethazine (PHENERGAN) 12.5 MG Tab Take 1 tablet (12.5 mg total) by mouth every 6 (six) hours as needed. 60 tablet 0    varicella-zoster [id number], PF, (SHINGRIX, PF,) 50 mcg/0.5 mL injection Inject into the muscle. 0.5 mL 0    [DISCONTINUED] oxyCODONE-acetaminophen (PERCOCET) 5-325 mg per tablet Take 1-2 tablets by mouth every 6 (six) hours as needed for Pain. 60 tablet 0    [DISCONTINUED] traMADol (ULTRAM) 50 mg tablet Take 1 tablet (50 mg total) by mouth every 24 hours as needed for Pain. 30 tablet 0     Facility-Administered Encounter Medications as of 7/8/2019   Medication Dose Route Frequency Provider Last Rate Last Dose    0.9%  NaCl infusion   Intravenous Continuous Callum Singer MD   Stopped at 05/21/19 1100         PHYSICAL EXAMINATION:    The patient generally appears in good " health, is appropriately interactive, and is in no apparent distress.    Skin: No lesions.    Mental: Cooperative with normal affect.    Neuro: Grossly intact.    HEENT: Normal. No evidence of lymphadenopathy.    Chest:  normal inspiratory effort.    Abdomen:  Soft, non-tender. No masses or organomegaly. Bladder is not palpable. No evidence of flank discomfort. No evidence of inguinal hernia.    Extremities: No clubbing, cyanosis, or edema    Normal external female genitalia  Grade I atrophy (slightly pale)  Urethral meatus is normal  Urethra and bladder are nontender to bimanual exam  Well supported anteriorly and posteriorly   Uterus and cervix are normal  No adnexal masses    LABS:    Lab Results   Component Value Date    BUN 13 05/22/2019    CREATININE 0.8 05/22/2019     UA 1.010, pH 6, otherwise, negative    IMPRESSION:    Encounter Diagnoses   Name Primary?    Ureterocele Yes    Recurrent UTI     Vaginal atrophy        PLAN:    1. Recommended using the Premarin cream  2.  Follow up in 3 months.  If the premarin is not effective, then would incise the ureterocele.   3.  Information from [facility] on ureteroceles was provided to the patient.

## 2019-07-11 ENCOUNTER — TELEPHONE (OUTPATIENT)
Dept: RHEUMATOLOGY | Facility: CLINIC | Age: 59
End: 2019-07-11

## 2019-07-11 DIAGNOSIS — R74.01 ELEVATED SGOT (AST): Primary | ICD-10-CM

## 2019-07-11 DIAGNOSIS — D70.8 OTHER NEUTROPENIA: Primary | ICD-10-CM

## 2019-07-12 ENCOUNTER — TELEPHONE (OUTPATIENT)
Dept: PHARMACY | Facility: CLINIC | Age: 59
End: 2019-07-12

## 2019-07-15 ENCOUNTER — OFFICE VISIT (OUTPATIENT)
Dept: INTERNAL MEDICINE | Facility: CLINIC | Age: 59
End: 2019-07-15
Payer: MEDICARE

## 2019-07-15 VITALS
DIASTOLIC BLOOD PRESSURE: 70 MMHG | BODY MASS INDEX: 29.45 KG/M2 | RESPIRATION RATE: 16 BRPM | SYSTOLIC BLOOD PRESSURE: 118 MMHG | TEMPERATURE: 98 F | WEIGHT: 156 LBS | HEIGHT: 61 IN | HEART RATE: 88 BPM

## 2019-07-15 DIAGNOSIS — B97.89 VIRAL SINUSITIS: Primary | ICD-10-CM

## 2019-07-15 DIAGNOSIS — J32.9 VIRAL SINUSITIS: Primary | ICD-10-CM

## 2019-07-15 DIAGNOSIS — J20.9 ACUTE BRONCHITIS, UNSPECIFIED ORGANISM: ICD-10-CM

## 2019-07-15 PROCEDURE — 3008F PR BODY MASS INDEX (BMI) DOCUMENTED: ICD-10-PCS | Mod: CPTII,S$GLB,, | Performed by: INTERNAL MEDICINE

## 2019-07-15 PROCEDURE — 99999 PR PBB SHADOW E&M-EST. PATIENT-LVL III: CPT | Mod: PBBFAC,,, | Performed by: INTERNAL MEDICINE

## 2019-07-15 PROCEDURE — 99214 OFFICE O/P EST MOD 30 MIN: CPT | Mod: S$GLB,,, | Performed by: INTERNAL MEDICINE

## 2019-07-15 PROCEDURE — 99999 PR PBB SHADOW E&M-EST. PATIENT-LVL III: ICD-10-PCS | Mod: PBBFAC,,, | Performed by: INTERNAL MEDICINE

## 2019-07-15 PROCEDURE — 3008F BODY MASS INDEX DOCD: CPT | Mod: CPTII,S$GLB,, | Performed by: INTERNAL MEDICINE

## 2019-07-15 PROCEDURE — 99214 PR OFFICE/OUTPT VISIT, EST, LEVL IV, 30-39 MIN: ICD-10-PCS | Mod: S$GLB,,, | Performed by: INTERNAL MEDICINE

## 2019-07-15 RX ORDER — CODEINE PHOSPHATE AND GUAIFENESIN 10; 100 MG/5ML; MG/5ML
5 SOLUTION ORAL 3 TIMES DAILY PRN
Qty: 236 ML | Refills: 0 | Status: SHIPPED | OUTPATIENT
Start: 2019-07-15 | End: 2019-07-16

## 2019-07-15 NOTE — PROGRESS NOTES
Subjective:       Patient ID: Joyce Peterson is a 58 y.o. female.    Chief Complaint: Cough (7/3 dx uti ampicillin and started 2 days before finished cough started. zpac given 7/12 .  chest pain at 3-4 )    HPI   Pt c/o 5 days of persistent sinus/chest congestion, mild productive cough,m post nasal drip, wheezing/SOB, body aches, fatigue, sore throat. Minimal relief with Flonase/Astelin and daily antihistamine. Pt was seen in  last Friday and was given a Z-Pack which has not helped much.   Review of Systems   Constitutional: Positive for fatigue. Negative for activity change, appetite change, chills, diaphoresis, fever and unexpected weight change.   HENT: Positive for congestion, postnasal drip, rhinorrhea, sinus pressure, sinus pain and sore throat. Negative for sneezing, trouble swallowing and voice change.    Respiratory: Positive for cough, shortness of breath and wheezing.    Cardiovascular: Negative for chest pain, palpitations and leg swelling.   Gastrointestinal: Negative for abdominal pain, blood in stool, constipation, diarrhea, nausea and vomiting.   Genitourinary: Negative for dysuria.   Musculoskeletal: Negative for arthralgias and myalgias.   Skin: Negative for rash and wound.   Allergic/Immunologic: Negative for environmental allergies and food allergies.   Hematological: Negative for adenopathy. Does not bruise/bleed easily.       Objective:      Physical Exam   Constitutional: She is oriented to person, place, and time. She appears well-developed and well-nourished. No distress.   HENT:   Head: Normocephalic and atraumatic.   Right Ear: External ear normal.   Left Ear: External ear normal.   Nose: Mucosal edema and rhinorrhea present.   Mouth/Throat: Oropharynx is clear and moist. No oropharyngeal exudate.   Eyes: Pupils are equal, round, and reactive to light. Conjunctivae and EOM are normal. Right eye exhibits no discharge. Left eye exhibits no discharge. No scleral icterus.   Neck: Neck  supple. No JVD present.   Cardiovascular: Normal rate, regular rhythm, normal heart sounds and intact distal pulses.   Pulmonary/Chest: Effort normal and breath sounds normal. No respiratory distress. She has no wheezes. She has no rales.   Abdominal: Soft. Bowel sounds are normal. There is no tenderness.   Musculoskeletal: She exhibits no edema.   Lymphadenopathy:     She has no cervical adenopathy.   Neurological: She is alert and oriented to person, place, and time.   Skin: Skin is warm and dry. No rash noted. She is not diaphoretic. No pallor.       Assessment:       1. Viral sinusitis    2. Acute bronchitis, unspecified organism        Plan:    1. Continue Flonase/Astelin and daily antihistamine   2. Rx Cheratussin AC TID PRN

## 2019-07-16 ENCOUNTER — OFFICE VISIT (OUTPATIENT)
Dept: CARDIOLOGY | Facility: CLINIC | Age: 59
End: 2019-07-16
Payer: MEDICARE

## 2019-07-16 ENCOUNTER — OFFICE VISIT (OUTPATIENT)
Dept: RHEUMATOLOGY | Facility: CLINIC | Age: 59
End: 2019-07-16
Payer: MEDICARE

## 2019-07-16 VITALS
HEART RATE: 93 BPM | BODY MASS INDEX: 29.47 KG/M2 | SYSTOLIC BLOOD PRESSURE: 119 MMHG | DIASTOLIC BLOOD PRESSURE: 76 MMHG | WEIGHT: 156.06 LBS | HEIGHT: 61 IN

## 2019-07-16 VITALS
WEIGHT: 158.69 LBS | HEIGHT: 64 IN | SYSTOLIC BLOOD PRESSURE: 118 MMHG | BODY MASS INDEX: 27.09 KG/M2 | HEART RATE: 99 BPM | DIASTOLIC BLOOD PRESSURE: 76 MMHG

## 2019-07-16 DIAGNOSIS — M05.79 RHEUMATOID ARTHRITIS INVOLVING MULTIPLE SITES WITH POSITIVE RHEUMATOID FACTOR: ICD-10-CM

## 2019-07-16 DIAGNOSIS — E78.00 PURE HYPERCHOLESTEROLEMIA: Primary | ICD-10-CM

## 2019-07-16 DIAGNOSIS — Z91.89 AT RISK FOR CORONARY ARTERY DISEASE: ICD-10-CM

## 2019-07-16 DIAGNOSIS — Z74.09 IMPAIRED FUNCTIONAL MOBILITY, BALANCE, GAIT, AND ENDURANCE: ICD-10-CM

## 2019-07-16 DIAGNOSIS — M35.01 SJOGREN'S SYNDROME WITH KERATOCONJUNCTIVITIS SICCA: ICD-10-CM

## 2019-07-16 DIAGNOSIS — M84.353G: ICD-10-CM

## 2019-07-16 DIAGNOSIS — M15.3 OTHER SECONDARY OSTEOARTHRITIS OF MULTIPLE SITES: Primary | ICD-10-CM

## 2019-07-16 DIAGNOSIS — I25.10 CORONARY ARTERY DISEASE INVOLVING NATIVE CORONARY ARTERY OF NATIVE HEART WITHOUT ANGINA PECTORIS: ICD-10-CM

## 2019-07-16 PROCEDURE — 3008F PR BODY MASS INDEX (BMI) DOCUMENTED: ICD-10-PCS | Mod: CPTII,S$GLB,, | Performed by: INTERNAL MEDICINE

## 2019-07-16 PROCEDURE — 3008F BODY MASS INDEX DOCD: CPT | Mod: CPTII,S$GLB,, | Performed by: INTERNAL MEDICINE

## 2019-07-16 PROCEDURE — 99214 OFFICE O/P EST MOD 30 MIN: CPT | Mod: S$GLB,,, | Performed by: INTERNAL MEDICINE

## 2019-07-16 PROCEDURE — 99214 PR OFFICE/OUTPT VISIT, EST, LEVL IV, 30-39 MIN: ICD-10-PCS | Mod: S$GLB,,, | Performed by: INTERNAL MEDICINE

## 2019-07-16 PROCEDURE — 99999 PR PBB SHADOW E&M-EST. PATIENT-LVL III: ICD-10-PCS | Mod: PBBFAC,,, | Performed by: INTERNAL MEDICINE

## 2019-07-16 PROCEDURE — 99999 PR PBB SHADOW E&M-EST. PATIENT-LVL III: CPT | Mod: PBBFAC,,, | Performed by: INTERNAL MEDICINE

## 2019-07-16 RX ORDER — AMPICILLIN 500 MG/1
500 CAPSULE ORAL EVERY 6 HOURS
Refills: 2 | COMMUNITY
Start: 2019-07-03 | End: 2019-07-22 | Stop reason: ALTCHOICE

## 2019-07-16 ASSESSMENT — ROUTINE ASSESSMENT OF PATIENT INDEX DATA (RAPID3)
MDHAQ FUNCTION SCORE: 1.6
FATIGUE SCORE: 2.5
AM STIFFNESS SCORE: 1, YES
TOTAL RAPID3 SCORE: 3.44
WHEN YOU AWAKENED IN THE MORNING OVER THE LAST WEEK, PLEASE INDICATE THE AMOUNT OF TIME IT TAKES UNTIL YOU ARE AS LIMBER AS YOU WILL BE FOR THE DAY: 3 HR
PSYCHOLOGICAL DISTRESS SCORE: 1.1
PAIN SCORE: 2
PATIENT GLOBAL ASSESSMENT SCORE: 3

## 2019-07-16 ASSESSMENT — DISEASE ACTIVITY SCORE (DAS28)
TENDER_JOINTS_COUNT: 0
TOTAL_SCORE_ESR: .91
ESR_MM_PER_HR: 2
SWOLLEN_JOINTS_COUNT: 0
TOTAL_SCORE_CRP: 1.41
CRP_MG_PER_LITER: .1
GLOBAL_HEALTH_SCORE: 30

## 2019-07-16 NOTE — PROGRESS NOTES
Subjective:    Patient ID:  Joyce Peterson is a 58 y.o. female who presents for follow-up of Coronary Artery Disease (Annual Exam )      HPI     58 year old female followed by Dr Moran with RA has a CAC of 123 in 2010. Last stress test in 2013 remained negative for ischemia. Her LDL have been well controlled with crestor 20 mg. She recently had a ORIF of left femur due to osteoporosis. She denies chest pain or MULLIGAN. Her last CAC was in 2010.  Lab Results   Component Value Date     07/11/2019    K 3.7 07/11/2019     07/11/2019    CO2 25 07/11/2019    BUN 15 07/11/2019    CREATININE 0.80 07/11/2019    GLU 94 07/11/2019    HGBA1C 6.0 02/06/2017    MG 2.0 04/17/2019    AST 89 (H) 08/08/2019    ALT 44 07/18/2019    ALBUMIN 4.1 07/18/2019    PROT 6.8 07/18/2019    BILITOT 0.6 07/18/2019    WBC 5.24 07/18/2019    HGB 13.0 07/18/2019    HCT 39.9 07/18/2019    MCV 91 07/18/2019     07/18/2019    INR 1.0 12/16/2015    TSH 0.424 02/28/2019         Lab Results   Component Value Date    CHOL 190 07/11/2019    HDL 85 (H) 07/11/2019    TRIG 146 07/11/2019       Lab Results   Component Value Date    LDLCALC 75.8 07/11/2019       Past Medical History:   Diagnosis Date    Acid reflux     Allergy     Anemia     Asthma     Coronary artery disease     Degenerative disc disease     Dry eyes     Dry mouth     Gastroparesis     Hyperlipidemia     Lateral meniscus derangement 4/6/2016    Lobular carcinoma in situ     Osteoarthritis     Osteoporosis     Rheumatoid arthritis(714.0)     Umbilical hernia 8/13/2015       Current Outpatient Medications:     aspirin 81 mg Tab, Take 81 mg by mouth every morning. , Disp: , Rfl:     azelastine (ASTELIN) 137 mcg nasal spray, 1 spray (137 mcg total) by Nasal route 2 (two) times daily. (Patient taking differently: 1 spray by Nasal route 2 (two) times daily as needed. ), Disp: 30 mL, Rfl: 11    budesonide-formoterol 160-4.5 mcg (SYMBICORT) 160-4.5 mcg/actuation  HFAA, Inhale 2 puffs into the lungs every 12 (twelve) hours., Disp: 3 Inhaler, Rfl: 3    calcium citrate-vitamin D (CITRACAL + D) 315-200 mg-unit per tablet, Take 1 tablet by mouth 2 (two) times daily. , Disp: , Rfl:     diclofenac sodium (VOLTAREN) 1 % Gel, Apply 2 g topically 4 (four) times daily as needed., Disp: 500 g, Rfl: 5    fluconazole (DIFLUCAN) 100 MG tablet, Take 100 mg by mouth once daily., Disp: , Rfl: 3    fluticasone propionate (FLONASE) 50 mcg/actuation nasal spray, 2 sprays (100 mcg total) by Each Nare route once daily., Disp: 1 Bottle, Rfl: 3    GUAIFENESIN (MUCINEX ORAL), Take 400 mg by mouth every 4 (four) hours as needed. , Disp: , Rfl:     INV PROPULSID 10 MG, Take 1 tablets (20 mg) by mouth 4 (four) times daily. FOR INVESTIGATIONAL USE ONLY. Protocol CIS-USA-154, Disp: 1320 each, Rfl: 0    montelukast (SINGULAIR) 10 mg tablet, Take 1 tablet (10 mg total) by mouth nightly., Disp: 90 tablet, Rfl: 3    omeprazole (PRILOSEC) 40 MG capsule, Take 1 capsule (40 mg total) by mouth every morning., Disp: 30 capsule, Rfl: 6    omeprazole-sodium bicarbonate (ZEGERID) 40-1.1 mg-gram per capsule, TAKE 1 CAPSULE BY MOUTH EVERY MORNING., Disp: 90 capsule, Rfl: 3    POTASSIUM ORAL, Take by mouth once daily., Disp: , Rfl:     predniSONE (DELTASONE) 1 MG tablet, Take 1 tablet (1 mg total) by mouth once daily., Disp: 90 tablet, Rfl: 1    PREMARIN 0.625 mg tablet, Take 0.625 mg by mouth once daily., Disp: , Rfl: 4    progesterone (PROMETRIUM) 100 MG capsule, Take 100 mg by mouth every morning. 1 Capsule Oral Every day, morning, Disp: , Rfl:     RESTASIS 0.05 % ophthalmic emulsion, PLACE 0.4 MLS (1 DROP TOTAL) INTO BOTH EYES 2 (TWO) TIMES DAILY., Disp: , Rfl: 5    rizatriptan (MAXALT) 10 MG tablet, Take 10 mg by mouth as needed for Migraine. , Disp: , Rfl:     rosuvastatin (CRESTOR) 20 MG tablet, Take 1 tablet (20 mg total) by mouth every evening., Disp: 90 tablet, Rfl: 3    sarilumab (KEVZARA)  "200 mg/1.14 mL Syrg, Inject 1.14 mLs (200 mg total) into the skin every 14 (fourteen) days., Disp: 2.28 mL, Rfl: 2    sucralfate (CARAFATE) 1 gram tablet, TAKE 1 TABLET (1 G TOTAL) BY MOUTH 4 (FOUR) TIMES DAILY., Disp: 360 tablet, Rfl: 3    syringe with needle (TUBERCULIN SYRINGE) 1 mL 27 x 1/2" Syrg, To administer methotrexate 7.5mg sc once a week, Disp: 12 Syringe, Rfl: 3    trospium (SANCTURA) 20 mg Tab tablet, TAKE 1 TABLET TWICE A DAY, Disp: 60 tablet, Rfl: 2    valACYclovir (VALTREX) 1000 MG tablet, TAKE 1 TABLET BY MOUTH TWICE A DAY AS NEEDED, Disp: 20 tablet, Rfl: 3    albuterol (ACCUNEB) 1.25 mg/3 mL Nebu, Take 3 mLs (1.25 mg total) by nebulization every 6 (six) hours as needed. Rescue, Disp: 1 Box, Rfl: 2    albuterol (PROVENTIL HFA) 90 mcg/actuation inhaler, Inhale 2 puffs into the lungs every 6 (six) hours as needed. Rescue, Disp: 20.1 Inhaler, Rfl: 0    leflunomide (ARAVA) 20 MG Tab, Take 1 tablet (20 mg total) by mouth once daily., Disp: 30 tablet, Rfl: 1    naproxen (NAPROSYN) 500 MG tablet, TAKE 1 TABLET TWICE A DAY AS NEEDED, Disp: 180 tablet, Rfl: 0    predniSONE (DELTASONE) 5 MG tablet, TAKE 1 TABLET BY MOUTH EVERY DAY, Disp: 90 tablet, Rfl: 1    PREMARIN vaginal cream, PLACE 0.5 G VAGINALLY 3 (THREE) TIMES A WEEK., Disp: 30 g, Rfl: 3  No current facility-administered medications for this visit.     Facility-Administered Medications Ordered in Other Visits:     0.9%  NaCl infusion, , Intravenous, Continuous, Callum Singer MD, Stopped at 05/21/19 1100          Review of Systems   Constitution: Negative for decreased appetite, diaphoresis, fever, malaise/fatigue, weight gain and weight loss.   HENT: Negative for congestion, ear discharge, ear pain and nosebleeds.    Eyes: Negative for blurred vision, double vision and visual disturbance.   Cardiovascular: Negative for chest pain, claudication, cyanosis, dyspnea on exertion, irregular heartbeat, leg swelling, near-syncope, orthopnea, " "palpitations, paroxysmal nocturnal dyspnea and syncope.   Respiratory: Negative for cough, hemoptysis, shortness of breath, sleep disturbances due to breathing, snoring, sputum production and wheezing.    Endocrine: Negative for polydipsia, polyphagia and polyuria.   Hematologic/Lymphatic: Negative for adenopathy and bleeding problem. Does not bruise/bleed easily.   Skin: Negative for color change, nail changes, poor wound healing and rash.   Musculoskeletal: Negative for muscle cramps and muscle weakness.   Gastrointestinal: Negative for abdominal pain, anorexia, change in bowel habit, hematochezia, nausea and vomiting.   Genitourinary: Negative for dysuria, frequency and hematuria.   Neurological: Negative for brief paralysis, difficulty with concentration, excessive daytime sleepiness, dizziness, focal weakness, headaches, light-headedness, seizures, vertigo and weakness.   Psychiatric/Behavioral: Negative for altered mental status and depression.   Allergic/Immunologic: Negative for persistent infections.        Objective:/76 (BP Location: Left arm, Patient Position: Sitting, BP Method: Medium (Automatic))   Pulse 93   Ht 5' 1" (1.549 m)   Wt 70.8 kg (156 lb 1.4 oz)   LMP  (LMP Unknown)   BMI 29.49 kg/m²             Physical Exam   Constitutional: She is oriented to person, place, and time. She appears well-developed and well-nourished.   HENT:   Head: Normocephalic.   Right Ear: External ear normal.   Left Ear: External ear normal.   Nose: Nose normal.   Inspection of lips, teeth and gums normal   Eyes: Pupils are equal, round, and reactive to light. Conjunctivae and EOM are normal. No scleral icterus.   Neck: Normal range of motion. No JVD present. No tracheal deviation present. No thyromegaly present.   Cardiovascular: Normal rate, regular rhythm, normal heart sounds and intact distal pulses. Exam reveals no gallop and no friction rub.   No murmur heard.  Pulses:       Dorsalis pedis pulses are 2+ " on the right side, and 2+ on the left side.        Posterior tibial pulses are 2+ on the right side, and 2+ on the left side.   Pulmonary/Chest: Effort normal and breath sounds normal. No respiratory distress. She has no wheezes. She has no rales. She exhibits no tenderness.   Abdominal: Soft. Bowel sounds are normal. She exhibits no distension. There is no hepatosplenomegaly. There is no tenderness. There is no guarding.   Musculoskeletal: Normal range of motion. She exhibits no edema or tenderness.   Lymphadenopathy:   Palpation of lymph nodes of neck and groin normal   Neurological: She is oriented to person, place, and time. No cranial nerve deficit. She exhibits normal muscle tone. Coordination normal.   Skin: Skin is warm and dry. No rash noted. No erythema. No pallor.   Palpation of skin normal   Psychiatric: She has a normal mood and affect. Her behavior is normal. Judgment and thought content normal.         Assessment:       1. Pure hypercholesterolemia    2. At risk for coronary artery disease    3. Coronary artery disease involving native coronary artery of native heart without angina pectoris    4. Rheumatoid arthritis involving multiple sites with positive rheumatoid factor    5. Sjogren's syndrome with keratoconjunctivitis sicca    6. Stress fracture of neck of femur with delayed healing    7. Impaired functional mobility, balance, gait, and endurance         Plan:       Joyce was seen today for coronary artery disease.    Diagnoses and all orders for this visit:    Pure hypercholesterolemia  -     Lipid panel; Future; Expected date: 07/15/2020    At risk for coronary artery disease  -     CT Cardiac Scoring; Future; Expected date: 07/16/2019  -     CT Cardiac Scoring; Future; Expected date: 07/16/2019    Coronary artery disease involving native coronary artery of native heart without angina pectoris  -     Lipid panel; Future; Expected date: 07/15/2020    Rheumatoid arthritis involving multiple  sites with positive rheumatoid factor    Sjogren's syndrome with keratoconjunctivitis sicca    Stress fracture of neck of femur with delayed healing    Impaired functional mobility, balance, gait, and endurance

## 2019-07-16 NOTE — PROGRESS NOTES
"Subjective:       Patient ID: Joyce Peterson is a 58 y.o. female.    Chief Complaint: No chief complaint on file.    HPI   PMHx RA, fibromyalgia, last clinic visit was April. She was complaining of left sided hip pain and had failed physical therapy.   Was having trouble applying rings due to PIP enlagement. Prescribed prednisone.Was initiated on sarilumab treatment 200mg sc q 2 weeks. She reports absence of any symptoms and is very pleased with the effectiveness of the sarilumab.ESR, CRP wnl.     She was recently diagnosed with bronchitis 1 week ago and is currently on antibiotics. She doubled her prednisone dose for 2 days and is now back at 5mg daily. She is amenable to tapering her steroid. She needs her second dose of Shingrix and is waiting the completion of her antibiotic treatment before doing so.     Osteopenia according to DXA scan in February 2019. She had a stress fracture in [facility] which was exacerbated throughout the following months. She had a intra-femoral joe inserted. She states that leg is doing well and she is without any significant pain.  She is still currently undergoing PT for both extremities. She take OTC Vitamin D supplementation and Prolia injections currently.      Review of Systems   Constitutional: Negative.  Negative for fever.   HENT: Negative.    Eyes: Negative.    Respiratory: Positive for cough.         Diagnosed last week with bronchitis, taking antibiotic therapy    Gastrointestinal: Positive for diarrhea.        One episode each morning after ingesting antibiotic pill. Resolves daily after initial episode.    Genitourinary: Negative.    Musculoskeletal: Negative for arthralgias, joint swelling and myalgias.   Neurological: Negative.    Hematological: Negative.          Objective:   /76   Pulse 99   Ht 5' 3.6" (1.615 m)   Wt 72 kg (158 lb 11.2 oz)   LMP  (LMP Unknown)   BMI 27.58 kg/m²      Physical Exam   Constitutional: She is oriented to person, place, and " time and well-developed, well-nourished, and in no distress.   HENT:   Head: Normocephalic.   Eyes: Conjunctivae are normal. Pupils are equal, round, and reactive to light.   Neck: Normal range of motion.   Cardiovascular: Normal rate and normal heart sounds.    Pulmonary/Chest: Effort normal and breath sounds normal. No respiratory distress. She has no wheezes. She has no rales.       Right Side Rheumatological Exam     Examination finds the shoulder, elbow, wrist, knee, 1st PIP, 1st MCP, 4th MCP, 5th PIP and 5th MCP normal.    The patient has an enlarged 2nd PIP, 2nd MCP, 3rd PIP, 3rd MCP and 4th PIP    Left Side Rheumatological Exam     Examination finds the shoulder, elbow, wrist, knee, 1st PIP, 1st MCP, 4th MCP, 5th PIP and 5th MCP normal.    The patient has an enlarged 2nd PIP, 2nd MCP, 3rd PIP, 3rd MCP and 4th PIP.      Lymphadenopathy:     She has no cervical adenopathy.   Neurological: She is alert and oriented to person, place, and time.   Skin: Skin is warm and dry. No rash noted. No erythema.     Musculoskeletal: Normal range of motion. She exhibits deformity. She exhibits no edema or tenderness.   Rheumatoid nodules noted to bilateral ulnar styloid processes.    Nodules also noted to bilateral 2nd MCPs.     Ulnar deviation of bilateral digits observed.            Assessment:       1. Other secondary osteoarthritis of multiple sites    2. Rheumatoid arthritis involving multiple sites with positive rheumatoid factor         RA started sarilumab 1st week March 200mg sc q 2 wks.  TJ 0  SJ 0  Pt global 30 ESR 2  CRP 0.1 DAS28 0.91  [id number] 1.41(both remission)  Fibromyalgia  Stress fracture left hip  Osteopenia, decide on additional denosumab based on DXA in Oct.    Plan:   Shingrix #2 before 8/14/19 after URI resolved and off antibiotics  Cont sarilumab 200mg sc q 2 wks  Decrease prednisone 4mg daily x 4wks then 3mg daily x 4 wks, then 2 mg daily and cont  Naproxen 500mg twice daily with omeprazole 40mg  before breakfast  leflunomide 20mg daily   Cont Premarin 0.625mg daily  RTC 3 months with standing labs and DXA     Ady Proctor MD  PGY1 PM&R

## 2019-07-16 NOTE — PROGRESS NOTES
I have personally taken the history and examined the patient and agree with the resident,s note as stated above       RA started sarilumab 1st week March 200mg sc q 2 wks.  TJ 0  SJ 0  Pt global 30 ESR 2  CRP 0.1 DAS28 0.91  [id number] 1.41(both remission)  Fibromyalgia  Stress fracture left hip  Osteopenia, decide on additional denosumab based on DXA in Oct.      *Shingrix #2 before 8/14/19 after URI resolved and off antibiotics  Cont sarilumab 200mg sc q 2 wks  Decrease prednisone 4mg daily x 4wks then 3mg daily x 4 wks, then 2 mg daily and cont  Naproxen 500mg twice daily with omeprazole 40mg before breakfast  leflunomide 20mg daily   Cont Premarin 0.625mg daily  RTC 3 months with standing labs and DXA

## 2019-07-16 NOTE — PATIENT INSTRUCTIONS
1. Receive 2nd Shingles vaccine dose after discontinuing antibiotics and prior to August 15    2. Have blood work drawn     3. Continue rheumatoid arthritis medications as prescribed    4. Do not take IBprofen and Aleve (Naproxen) simultaneously.  One or the other.     5. Continue Physical Therapy exercises at home one discharged from therapy.     6. Instituting prednisone taper   - take 4mg (4 tabs) daily for 1 month   - then take 3mg (3 tabs) daily for 1 month   - then take 2mg (2 tabs) daily for 1 month   -then will return to clinic for re-evaluation    7. DXA bone scan to be scheduled for October 8. Return to clinic in 3 months

## 2019-07-18 ENCOUNTER — TELEPHONE (OUTPATIENT)
Dept: RHEUMATOLOGY | Facility: HOSPITAL | Age: 59
End: 2019-07-18

## 2019-07-18 ENCOUNTER — TELEPHONE (OUTPATIENT)
Dept: RHEUMATOLOGY | Facility: CLINIC | Age: 59
End: 2019-07-18

## 2019-07-18 DIAGNOSIS — R74.01 ELEVATED SGOT (AST): Primary | ICD-10-CM

## 2019-07-20 DIAGNOSIS — K31.84 GASTROPARESIS: Primary | ICD-10-CM

## 2019-07-22 ENCOUNTER — HOSPITAL ENCOUNTER (OUTPATIENT)
Dept: RADIOLOGY | Facility: HOSPITAL | Age: 59
Discharge: HOME | End: 2019-07-22
Attending: INTERNAL MEDICINE
Payer: MEDICARE

## 2019-07-22 ENCOUNTER — OFFICE VISIT (OUTPATIENT)
Dept: INTERNAL MEDICINE | Facility: CLINIC | Age: 59
End: 2019-07-22
Payer: MEDICARE

## 2019-07-22 VITALS
WEIGHT: 156.94 LBS | HEIGHT: 61 IN | DIASTOLIC BLOOD PRESSURE: 76 MMHG | BODY MASS INDEX: 29.63 KG/M2 | HEART RATE: 85 BPM | RESPIRATION RATE: 18 BRPM | SYSTOLIC BLOOD PRESSURE: 118 MMHG | TEMPERATURE: 98 F

## 2019-07-22 DIAGNOSIS — J32.9 VIRAL SINUSITIS: Primary | ICD-10-CM

## 2019-07-22 DIAGNOSIS — B97.89 VIRAL SINUSITIS: Primary | ICD-10-CM

## 2019-07-22 DIAGNOSIS — J20.9 ACUTE BRONCHITIS, UNSPECIFIED ORGANISM: ICD-10-CM

## 2019-07-22 DIAGNOSIS — M06.9 RHEUMATOID ARTHRITIS, INVOLVING UNSPECIFIED SITE, UNSPECIFIED RHEUMATOID FACTOR PRESENCE: ICD-10-CM

## 2019-07-22 PROCEDURE — 99213 PR OFFICE/OUTPT VISIT, EST, LEVL III, 20-29 MIN: ICD-10-PCS | Mod: S$GLB,,, | Performed by: INTERNAL MEDICINE

## 2019-07-22 PROCEDURE — 71046 XR CHEST PA AND LATERAL: ICD-10-PCS | Mod: 26,,, | Performed by: RADIOLOGY

## 2019-07-22 PROCEDURE — 3008F BODY MASS INDEX DOCD: CPT | Mod: CPTII,S$GLB,, | Performed by: INTERNAL MEDICINE

## 2019-07-22 PROCEDURE — 71046 X-RAY EXAM CHEST 2 VIEWS: CPT | Mod: 26,,, | Performed by: RADIOLOGY

## 2019-07-22 PROCEDURE — 99213 OFFICE O/P EST LOW 20 MIN: CPT | Mod: S$GLB,,, | Performed by: INTERNAL MEDICINE

## 2019-07-22 PROCEDURE — 99999 PR PBB SHADOW E&M-EST. PATIENT-LVL III: ICD-10-PCS | Mod: PBBFAC,,, | Performed by: INTERNAL MEDICINE

## 2019-07-22 PROCEDURE — 71046 X-RAY EXAM CHEST 2 VIEWS: CPT | Mod: TC,FY,PO

## 2019-07-22 PROCEDURE — 99999 PR PBB SHADOW E&M-EST. PATIENT-LVL III: CPT | Mod: PBBFAC,,, | Performed by: INTERNAL MEDICINE

## 2019-07-22 PROCEDURE — 3008F PR BODY MASS INDEX (BMI) DOCUMENTED: ICD-10-PCS | Mod: CPTII,S$GLB,, | Performed by: INTERNAL MEDICINE

## 2019-07-22 RX ORDER — NAPROXEN 500 MG/1
TABLET ORAL
Qty: 180 TABLET | Refills: 0 | Status: SHIPPED | OUTPATIENT
Start: 2019-07-22 | End: 2019-12-04 | Stop reason: SDUPTHER

## 2019-07-22 RX ORDER — ALBUTEROL SULFATE 1.25 MG/3ML
1.25 SOLUTION RESPIRATORY (INHALATION) EVERY 6 HOURS PRN
Qty: 1 BOX | Refills: 2 | Status: SHIPPED | OUTPATIENT
Start: 2019-07-22 | End: 2020-06-10 | Stop reason: SDUPTHER

## 2019-07-22 RX ORDER — LEFLUNOMIDE 20 MG/1
20 TABLET ORAL DAILY
Qty: 30 TABLET | Refills: 1 | Status: SHIPPED | OUTPATIENT
Start: 2019-07-22 | End: 2019-08-19 | Stop reason: SDUPTHER

## 2019-07-22 NOTE — PROGRESS NOTES
Subjective:       Patient ID: Joyce Peterson is a 58 y.o. female.    Chief Complaint: Bronchitis (rib pain both sides) and Cough    HPI   Pt here for evaluation of 3 wks of persistent sinus/chest congestion, mostly dry cough, post nasal drip, wheezing/SOB, sinus headache. Minimal relief with Flonase, Astelin, Claritin, Albuterol, Symbicort.   Review of Systems   Constitutional: Negative for activity change, appetite change, chills, diaphoresis, fatigue, fever and unexpected weight change.   HENT: Positive for congestion, postnasal drip, rhinorrhea, sinus pressure, sinus pain and sore throat. Negative for sneezing, trouble swallowing and voice change.    Respiratory: Positive for cough, shortness of breath and wheezing.    Cardiovascular: Negative for chest pain, palpitations and leg swelling.   Gastrointestinal: Negative for abdominal pain, blood in stool, constipation, diarrhea, nausea and vomiting.   Genitourinary: Negative for dysuria.   Musculoskeletal: Negative for arthralgias and myalgias.   Skin: Negative for rash and wound.   Allergic/Immunologic: Negative for environmental allergies and food allergies.   Hematological: Negative for adenopathy. Does not bruise/bleed easily.       Objective:      Physical Exam   Constitutional: She is oriented to person, place, and time. She appears well-developed and well-nourished. No distress.   HENT:   Head: Normocephalic and atraumatic.   Right Ear: External ear normal.   Left Ear: External ear normal.   Nose: Mucosal edema and rhinorrhea present.   Mouth/Throat: Oropharynx is clear and moist. No oropharyngeal exudate.   Eyes: Pupils are equal, round, and reactive to light. Conjunctivae and EOM are normal. Right eye exhibits no discharge. Left eye exhibits no discharge. No scleral icterus.   Neck: Neck supple. No JVD present.   Cardiovascular: Normal rate, regular rhythm, normal heart sounds and intact distal pulses.   Pulmonary/Chest: Effort normal and breath sounds  normal. No respiratory distress. She has no wheezes. She has no rales.   Musculoskeletal: She exhibits no edema.   Lymphadenopathy:     She has no cervical adenopathy.   Neurological: She is alert and oriented to person, place, and time.   Skin: Skin is warm and dry. No rash noted. She is not diaphoretic. No pallor.       Assessment:       1. Viral sinusitis    2. Acute bronchitis, unspecified organism        Plan:    1. Continue Claritin/Flonase/Astelin   2. CXR       Continue Cheratussin AC TID PRN

## 2019-08-05 ENCOUNTER — TELEPHONE (OUTPATIENT)
Dept: INTERNAL MEDICINE | Facility: CLINIC | Age: 59
End: 2019-08-05

## 2019-08-05 ENCOUNTER — PATIENT MESSAGE (OUTPATIENT)
Dept: GASTROENTEROLOGY | Facility: CLINIC | Age: 59
End: 2019-08-05

## 2019-08-05 ENCOUNTER — PATIENT MESSAGE (OUTPATIENT)
Dept: RHEUMATOLOGY | Facility: CLINIC | Age: 59
End: 2019-08-05

## 2019-08-05 DIAGNOSIS — N95.2 VAGINAL ATROPHY: ICD-10-CM

## 2019-08-05 DIAGNOSIS — Z00.00 ANNUAL PHYSICAL EXAM: Primary | ICD-10-CM

## 2019-08-05 DIAGNOSIS — N39.0 RECURRENT UTI: ICD-10-CM

## 2019-08-05 RX ORDER — CONJUGATED ESTROGENS 0.62 MG/G
CREAM VAGINAL
Qty: 30 G | Refills: 3 | Status: SHIPPED | OUTPATIENT
Start: 2019-08-05 | End: 2019-12-02 | Stop reason: SDUPTHER

## 2019-08-08 ENCOUNTER — PATIENT MESSAGE (OUTPATIENT)
Dept: INTERNAL MEDICINE | Facility: CLINIC | Age: 59
End: 2019-08-08

## 2019-08-08 ENCOUNTER — TELEPHONE (OUTPATIENT)
Dept: RHEUMATOLOGY | Facility: CLINIC | Age: 59
End: 2019-08-08

## 2019-08-08 ENCOUNTER — IMMUNIZATION (OUTPATIENT)
Dept: PHARMACY | Facility: CLINIC | Age: 59
End: 2019-08-08
Payer: MEDICARE

## 2019-08-08 DIAGNOSIS — R74.01 ELEVATED SGOT (AST): Primary | ICD-10-CM

## 2019-08-08 NOTE — TELEPHONE ENCOUNTER
The AST is higher than one month ago. Could be Kevzara, leflunomide, naproxen or diclofenac gel. Woud suggest stopping the naproxen and diclofenac gel which are less important than the other 2 and will recheck liver tests in 10 days. RJQ      Please add ck, ggt to current labs  Please schedule hepatic function in 10 days. Ask her to stop naproxen and diclofenac gel. No alcohol or Tylenol until rechecked.  Thanks RJLILA

## 2019-08-09 ENCOUNTER — PATIENT MESSAGE (OUTPATIENT)
Dept: RHEUMATOLOGY | Facility: CLINIC | Age: 59
End: 2019-08-09

## 2019-08-10 RX ORDER — PREDNISONE 5 MG/1
TABLET ORAL
Qty: 90 TABLET | Refills: 1 | Status: SHIPPED | OUTPATIENT
Start: 2019-08-10 | End: 2019-09-24

## 2019-08-12 RX ORDER — ALBUTEROL SULFATE 90 UG/1
2 AEROSOL, METERED RESPIRATORY (INHALATION) EVERY 6 HOURS PRN
Qty: 20.1 INHALER | Refills: 0 | Status: SHIPPED | OUTPATIENT
Start: 2019-08-12 | End: 2019-11-04 | Stop reason: SDUPTHER

## 2019-08-13 ENCOUNTER — HOSPITAL ENCOUNTER (OUTPATIENT)
Dept: RADIOLOGY | Facility: HOSPITAL | Age: 59
Discharge: HOME | End: 2019-08-13
Attending: PHYSICIAN ASSISTANT
Payer: MEDICARE

## 2019-08-13 ENCOUNTER — OFFICE VISIT (OUTPATIENT)
Dept: ORTHOPEDICS | Facility: CLINIC | Age: 59
End: 2019-08-13
Payer: MEDICARE

## 2019-08-13 ENCOUNTER — HOSPITAL ENCOUNTER (OUTPATIENT)
Dept: RADIOLOGY | Facility: HOSPITAL | Age: 59
Discharge: HOME | End: 2019-08-13
Attending: INTERNAL MEDICINE
Payer: MEDICARE

## 2019-08-13 VITALS — WEIGHT: 156 LBS | HEIGHT: 61 IN | BODY MASS INDEX: 29.45 KG/M2

## 2019-08-13 DIAGNOSIS — M84.352A STRESS FRACTURE OF NECK OF LEFT FEMUR: ICD-10-CM

## 2019-08-13 DIAGNOSIS — M84.352A STRESS FRACTURE OF NECK OF LEFT FEMUR: Primary | ICD-10-CM

## 2019-08-13 DIAGNOSIS — Z91.89 AT RISK FOR CORONARY ARTERY DISEASE: ICD-10-CM

## 2019-08-13 PROCEDURE — 75571 CT HRT W/O DYE W/CA TEST: CPT | Mod: TC

## 2019-08-13 PROCEDURE — 99999 PR PBB SHADOW E&M-EST. PATIENT-LVL IV: CPT | Mod: PBBFAC,,, | Performed by: PHYSICIAN ASSISTANT

## 2019-08-13 PROCEDURE — 75571 CT CALCIUM SCORING CARDIAC: ICD-10-PCS | Mod: 26,,, | Performed by: RADIOLOGY

## 2019-08-13 PROCEDURE — 99024 PR POST-OP FOLLOW-UP VISIT: ICD-10-PCS | Mod: S$GLB,,, | Performed by: PHYSICIAN ASSISTANT

## 2019-08-13 PROCEDURE — 99999 PR PBB SHADOW E&M-EST. PATIENT-LVL IV: ICD-10-PCS | Mod: PBBFAC,,, | Performed by: PHYSICIAN ASSISTANT

## 2019-08-13 PROCEDURE — 99024 POSTOP FOLLOW-UP VISIT: CPT | Mod: S$GLB,,, | Performed by: PHYSICIAN ASSISTANT

## 2019-08-13 PROCEDURE — 75571 CT HRT W/O DYE W/CA TEST: CPT | Mod: 26,,, | Performed by: RADIOLOGY

## 2019-08-13 PROCEDURE — 73552 X-RAY EXAM OF FEMUR 2/>: CPT | Mod: 26,LT,, | Performed by: RADIOLOGY

## 2019-08-13 PROCEDURE — 73552 XR FEMUR 2 VIEW LEFT: ICD-10-PCS | Mod: 26,LT,, | Performed by: RADIOLOGY

## 2019-08-13 PROCEDURE — 73552 X-RAY EXAM OF FEMUR 2/>: CPT | Mod: TC,LT

## 2019-08-14 ENCOUNTER — TELEPHONE (OUTPATIENT)
Dept: RHEUMATOLOGY | Facility: CLINIC | Age: 59
End: 2019-08-14

## 2019-08-14 ENCOUNTER — PATIENT MESSAGE (OUTPATIENT)
Dept: RHEUMATOLOGY | Facility: CLINIC | Age: 59
End: 2019-08-14

## 2019-08-14 ENCOUNTER — PATIENT MESSAGE (OUTPATIENT)
Dept: CARDIOLOGY | Facility: CLINIC | Age: 59
End: 2019-08-14

## 2019-08-14 ENCOUNTER — HOME CARE VISIT (OUTPATIENT)
Dept: RHEUMATOLOGY | Facility: CLINIC | Age: 59
End: 2019-08-14

## 2019-08-14 DIAGNOSIS — Z87.898 HISTORY OF JAUNDICE: Primary | ICD-10-CM

## 2019-08-14 DIAGNOSIS — R17 JAUNDICE: Primary | ICD-10-CM

## 2019-08-14 NOTE — TELEPHONE ENCOUNTER
Please schedule hepatic function panel and urinalysis today and find out if she is taking naproxen and let me know. Thanks MEGA

## 2019-08-15 ENCOUNTER — TELEPHONE (OUTPATIENT)
Dept: PHARMACY | Facility: CLINIC | Age: 59
End: 2019-08-15

## 2019-08-16 ENCOUNTER — TELEPHONE (OUTPATIENT)
Dept: PHARMACY | Facility: CLINIC | Age: 59
End: 2019-08-16

## 2019-08-16 NOTE — PROGRESS NOTES
Ms. Peterson is 59 yo F with RA on chronic steroids with a 5 month history of left hip pain, worsening in the last several weeks.  She had an MRI which showed no evidence of AVN, but did show a stress fracture from the transcervical superior femoral neck, through the bascicervical region inferiorly, extending into the IT region on both T2 and T1.  Patietn treated with CM nail.     Ms. Peterson is here today for a post-operative visit after a    Cephalomedullary nail fixation of left intertrochanteric femur fracture   by Dr. Duff  on 05/21/2019.    Interval History:    Patient reported that she is doing  better. She is taking OTC medication for pain.  She is ambulating unassisted.   she denies fever, chills, and sweats since the time of the surgery.     Physical exam:  Walked into clinic unassisted unaccompanied  full range of motion no TTP, mild decreased sensation between incisions.      RADS: Postoperative changes are again noted relating to prior internal fixation of a left femoral neck fracture, an intramedullary nail in place as before.  No evidence of superimposed more recent fracture, lytic destructive process, avascular necrosis of the left femoral head, or other significant detrimental interval change since 07/02/2019 is observed.    Assessment:  Post-op visit (12 weeks)    Plan:  Current care, treatment plan, precautions, activity level/ modifications, limitations, rehabilitation exercises and proposed future treatment were discussed with the patient. We discussed the need to monitor for changes in symptoms and condition and report them to the physician.  Discussed importance of compliance with all appointments and follow up examinations.   -  she may wash the area with antibacterial soap in the shower. Will not submerge until the incision is completely healed  -Patient was advised to monitor wound closely and multiple times daily for any problems. Call clinic immediately or report to ED for immediate  medical attention for any complications including reopening of wound, drainage, purulence, redness, streaking, odor, pain out of proportion, fever, chills, etc.   -PT, will continue to work on exercises on her own at the gym Patient is responsible to establish and continue care   -range of motion as tolerated    - WBAT   - pain medication: no refill needed, not taking due to side effectes   Pain medication refill policy provided to patient for review.   - Patient is to return to clinic at 1 year harsha  At time  x-ray of her femur is needed       If there are any questions prior to scheduled follow up, the patient was instructed to contact the office

## 2019-08-19 ENCOUNTER — PATIENT MESSAGE (OUTPATIENT)
Dept: RHEUMATOLOGY | Facility: CLINIC | Age: 59
End: 2019-08-19

## 2019-08-19 ENCOUNTER — PATIENT MESSAGE (OUTPATIENT)
Dept: GASTROENTEROLOGY | Facility: CLINIC | Age: 59
End: 2019-08-19

## 2019-08-19 DIAGNOSIS — M06.9 RHEUMATOID ARTHRITIS, INVOLVING UNSPECIFIED SITE, UNSPECIFIED RHEUMATOID FACTOR PRESENCE: ICD-10-CM

## 2019-08-19 RX ORDER — LEFLUNOMIDE 20 MG/1
20 TABLET ORAL DAILY
Qty: 30 TABLET | Refills: 2 | Status: SHIPPED | OUTPATIENT
Start: 2019-08-19 | End: 2019-12-27

## 2019-08-20 ENCOUNTER — PATIENT OUTREACH (OUTPATIENT)
Dept: ADMINISTRATIVE | Facility: OTHER | Age: 59
End: 2019-08-20

## 2019-08-20 DIAGNOSIS — Z12.31 ENCOUNTER FOR SCREENING MAMMOGRAM FOR MALIGNANT NEOPLASM OF BREAST: Primary | ICD-10-CM

## 2019-08-21 ENCOUNTER — APPOINTMENT (OUTPATIENT)
Dept: RADIOLOGY | Facility: OTHER | Age: 59
End: 2019-08-21
Attending: PODIATRIST
Payer: MEDICARE

## 2019-08-21 ENCOUNTER — OFFICE VISIT (OUTPATIENT)
Dept: PODIATRY | Facility: CLINIC | Age: 59
End: 2019-08-21
Payer: MEDICARE

## 2019-08-21 ENCOUNTER — TELEPHONE (OUTPATIENT)
Dept: CARDIOLOGY | Facility: CLINIC | Age: 59
End: 2019-08-21

## 2019-08-21 VITALS — WEIGHT: 156 LBS | BODY MASS INDEX: 29.45 KG/M2 | HEIGHT: 61 IN

## 2019-08-21 DIAGNOSIS — M79.671 RIGHT FOOT PAIN: ICD-10-CM

## 2019-08-21 DIAGNOSIS — I25.10 CORONARY ARTERY DISEASE INVOLVING NATIVE CORONARY ARTERY OF NATIVE HEART WITHOUT ANGINA PECTORIS: Primary | ICD-10-CM

## 2019-08-21 DIAGNOSIS — Z87.312 HISTORY OF STRESS FRACTURE: ICD-10-CM

## 2019-08-21 DIAGNOSIS — M79.671 RIGHT FOOT PAIN: Primary | ICD-10-CM

## 2019-08-21 PROCEDURE — 73630 X-RAY EXAM OF FOOT: CPT | Mod: 26,RT,, | Performed by: RADIOLOGY

## 2019-08-21 PROCEDURE — 99213 OFFICE O/P EST LOW 20 MIN: CPT | Mod: S$GLB,,, | Performed by: PODIATRIST

## 2019-08-21 PROCEDURE — 99999 PR PBB SHADOW E&M-EST. PATIENT-LVL IV: ICD-10-PCS | Mod: PBBFAC,,, | Performed by: PODIATRIST

## 2019-08-21 PROCEDURE — 73630 X-RAY EXAM OF FOOT: CPT | Mod: TC,RT

## 2019-08-21 PROCEDURE — 99213 PR OFFICE/OUTPT VISIT, EST, LEVL III, 20-29 MIN: ICD-10-PCS | Mod: S$GLB,,, | Performed by: PODIATRIST

## 2019-08-21 PROCEDURE — 73630 XR FOOT COMPLETE 3 VIEW RIGHT: ICD-10-PCS | Mod: 26,RT,, | Performed by: RADIOLOGY

## 2019-08-21 PROCEDURE — 99999 PR PBB SHADOW E&M-EST. PATIENT-LVL IV: CPT | Mod: PBBFAC,,, | Performed by: PODIATRIST

## 2019-08-21 PROCEDURE — 3008F PR BODY MASS INDEX (BMI) DOCUMENTED: ICD-10-PCS | Mod: CPTII,S$GLB,, | Performed by: PODIATRIST

## 2019-08-21 PROCEDURE — 3008F BODY MASS INDEX DOCD: CPT | Mod: CPTII,S$GLB,, | Performed by: PODIATRIST

## 2019-08-21 NOTE — PROGRESS NOTES
Chief Complaint   Patient presents with    Foot Pain     right foot          HPI:       Joyce Peterson is a 58 y.o. female who presents to clinic for right foot swelling and tenderness at the forefoot noted about a week ago.  She does not recall acute trauma to the area.   She has history of Rheumatoid Arthritis, but not taking meds not due to being on antibiotics.   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   She is on Prednisone.          Patient Active Problem List   Diagnosis    Osteoarthritis    GERD (gastroesophageal reflux disease)    Gastroparesis    Osteopenia    Coronary artery disease    Lumbar spondylosis    CS (cervical spondylosis)    Thyroid nodule    Hyperlipidemia    Uncomplicated asthma    AR (allergic rhinitis)    Immunosuppressed status    Rheumatoid arthritis involving multiple sites    Carpal tunnel syndrome of right wrist    Elevated parathyroid hormone    Hypogammaglobulinemia    Right carpal tunnel syndrome    Pain in right hand    Pain in right elbow    Decreased  strength of right hand    Right shoulder pain    Iron deficiency anemia due to chronic blood loss    Pure hypercholesterolemia    Research study patient    Sjogren's syndrome    Esophageal candidiasis    Rupture of left triceps tendon    Left hip pain    Gait abnormality    Trochanteric bursitis of left hip    Stress fracture of neck of left femur    Stress fracture of neck of femur with delayed healing    Left leg weakness    Impaired functional mobility, balance, gait, and endurance    Coronary artery disease involving native coronary artery of native heart without angina pectoris               Current Outpatient Medications on File Prior to Visit   Medication Sig Dispense Refill    albuterol (ACCUNEB) 1.25 mg/3 mL Nebu Take 3 mLs (1.25 mg total) by nebulization every 6 (six) hours as needed. Rescue 1 Box 2    albuterol (PROVENTIL HFA) 90  mcg/actuation inhaler Inhale 2 puffs into the lungs every 6 (six) hours as needed. Rescue 20.1 Inhaler 0    aspirin 81 mg Tab Take 81 mg by mouth every morning.       azelastine (ASTELIN) 137 mcg nasal spray 1 spray (137 mcg total) by Nasal route 2 (two) times daily. (Patient taking differently: 1 spray by Nasal route 2 (two) times daily as needed. ) 30 mL 11    budesonide-formoterol 160-4.5 mcg (SYMBICORT) 160-4.5 mcg/actuation HFAA Inhale 2 puffs into the lungs every 12 (twelve) hours. 3 Inhaler 3    calcium citrate-vitamin D (CITRACAL + D) 315-200 mg-unit per tablet Take 1 tablet by mouth 2 (two) times daily.       diclofenac sodium (VOLTAREN) 1 % Gel Apply 2 g topically 4 (four) times daily as needed. 500 g 5    fluconazole (DIFLUCAN) 100 MG tablet Take 100 mg by mouth once daily.  3    fluticasone propionate (FLONASE) 50 mcg/actuation nasal spray 2 sprays (100 mcg total) by Each Nare route once daily. 1 Bottle 3    GUAIFENESIN (MUCINEX ORAL) Take 400 mg by mouth every 4 (four) hours as needed.       INV PROPULSID 10 MG Take 1 tablets (20 mg) by mouth 4 (four) times daily. FOR INVESTIGATIONAL USE ONLY. Protocol CIS-USA-154 1320 each 0    leflunomide (ARAVA) 20 MG Tab Take 1 tablet (20 mg total) by mouth once daily. 30 tablet 2    montelukast (SINGULAIR) 10 mg tablet Take 1 tablet (10 mg total) by mouth nightly. 90 tablet 3    naproxen (NAPROSYN) 500 MG tablet TAKE 1 TABLET TWICE A DAY AS NEEDED 180 tablet 0    omeprazole (PRILOSEC) 40 MG capsule Take 1 capsule (40 mg total) by mouth every morning. 30 capsule 6    omeprazole-sodium bicarbonate (ZEGERID) 40-1.1 mg-gram per capsule TAKE 1 CAPSULE BY MOUTH EVERY MORNING. 90 capsule 3    POTASSIUM ORAL Take by mouth once daily.      predniSONE (DELTASONE) 1 MG tablet Take 1 tablet (1 mg total) by mouth once daily. 90 tablet 1    predniSONE (DELTASONE) 5 MG tablet TAKE 1 TABLET BY MOUTH EVERY DAY 90 tablet 1    PREMARIN 0.625 mg tablet Take 0.625 mg  "by mouth once daily.  4    PREMARIN vaginal cream PLACE 0.5 G VAGINALLY 3 (THREE) TIMES A WEEK. 30 g 3    progesterone (PROMETRIUM) 100 MG capsule Take 100 mg by mouth every morning. 1 Capsule Oral Every day, morning      RESTASIS 0.05 % ophthalmic emulsion PLACE 0.4 MLS (1 DROP TOTAL) INTO BOTH EYES 2 (TWO) TIMES DAILY.  5    rizatriptan (MAXALT) 10 MG tablet Take 10 mg by mouth as needed for Migraine.       rosuvastatin (CRESTOR) 20 MG tablet Take 1 tablet (20 mg total) by mouth every evening. 90 tablet 3    sarilumab (KEVZARA) 200 mg/1.14 mL Syrg Inject 1.14 mLs (200 mg total) into the skin every 14 (fourteen) days. 2.28 mL 2    sucralfate (CARAFATE) 1 gram tablet TAKE 1 TABLET (1 G TOTAL) BY MOUTH 4 (FOUR) TIMES DAILY. 360 tablet 3    syringe with needle (TUBERCULIN SYRINGE) 1 mL 27 x 1/2" Syrg To administer methotrexate 7.5mg sc once a week 12 Syringe 3    trospium (SANCTURA) 20 mg Tab tablet TAKE 1 TABLET TWICE A DAY 60 tablet 2    valACYclovir (VALTREX) 1000 MG tablet TAKE 1 TABLET BY MOUTH TWICE A DAY AS NEEDED 20 tablet 3     Current Facility-Administered Medications on File Prior to Visit   Medication Dose Route Frequency Provider Last Rate Last Dose    0.9%  NaCl infusion   Intravenous Continuous Callum Singer MD   Stopped at 05/21/19 1100           Review of patient's allergies indicates:   Allergen Reactions    Actemra [tocilizumab] Other (See Comments)     Severe dizziness    Codeine Nausea And Vomiting    Gold au 198 Hives and Rash    Hydroxychloroquine Other (See Comments)     Can't remember the reaction      Iodinated contrast media - oral and iv dye Other (See Comments)     Other reaction(s): BURNING ALL OVER    Iodine Other (See Comments)     Other reaction(s): BURNING ALL OVER - Iodine dye - Not topical    Sulfa (sulfonamide antibiotics) Other (See Comments)     Can't remember the reaction    Zofran [ondansetron hcl (pf)] Nausea And Vomiting     Pt reports that last time " "she received zofran she started vomiting again    Methotrexate analogues Nausea Only    Pneumovax 23 [pneumococcal 23-argenis ps vaccine] Other (See Comments)     "sick"           Social History     Socioeconomic History    Marital status:      Spouse name: Not on file    Number of children: Not on file    Years of education: Not on file    Highest education level: Not on file   Occupational History    Not on file   Social Needs    Financial resource strain: Not on file    Food insecurity:     Worry: Not on file     Inability: Not on file    Transportation needs:     Medical: Not on file     Non-medical: Not on file   Tobacco Use    Smoking status: Never Smoker    Smokeless tobacco: Never Used   Substance and Sexual Activity    Alcohol use: Yes     Comment: occasionally    Drug use: No    Sexual activity: Yes     Partners: Male     Birth control/protection: Surgical   Lifestyle    Physical activity:     Days per week: Not on file     Minutes per session: Not on file    Stress: Not on file   Relationships    Social connections:     Talks on phone: Not on file     Gets together: Not on file     Attends Rastafarian service: Not on file     Active member of club or organization: Not on file     Attends meetings of clubs or organizations: Not on file     Relationship status: Not on file   Other Topics Concern    Are you pregnant or think you may be? Not Asked    Breast-feeding Not Asked   Social History Narrative    Not on file           ROS:  General ROS: negative for  chills, fatigue or fever  Cardiovascular ROS: no chest pain or dyspnea on exertion  Musculoskeletal ROS: negative for joint pain or joint stiffness.  Negative for loss of strength.  Positive for foot pain.   Neuro ROS: Negative for syncope, numbness, or muscle weakness  Skin ROS: Negative for rash, itching or nail/hair changes.           OBJECTIVE:         Vitals:    08/21/19 0838   Weight: 70.8 kg (156 lb)   Height: 5' 1" (1.549 m) "        Bilateral  Lower extremity exam:    Vasc: Palpable pedal pulses. Feet appropriately warm to touch. Cap refill time is within normal limits.  There is edema to the right forefoot.      Neurological:  Light touch, proprioception, and Sharp/dull sensation are all intact. There is no Tinel's along the tarsal tunnel.    Negative genesis's.  No sensorimotor deficitis.     Derm: No open lesions, macerations, or rashes.  No bruising.  No open wounds.   Redness over the bilateral bunions and tailor's bunions      MSK:  Mild  tenderness to palpation near the 4th or 5th metatarsal shaft;   bilateral bunions and tailors bunion with redness 2/2 shoe irritation, worse on the right.    Limited 1st metatarso-phalangeal joint range of motion.   bilateral adductovarus 5th toes.       8/21/19  FINDINGS:  Healing fractures of the 3rd, 4th and 5th metatarsal bones identified.  The position alignment is unchanged as compared to the previous study.  DJD, hallux valgus and bunion formation.  Small calcification noted the adjacent to the 1st metatarsophalangeal joint medially.  Pes planus.        ASSESSMENT/PLAN:        Right foot pain  -     X-Ray Foot Complete Right; Future; Expected date: 08/21/2019    History of stress fracture  -     X-Ray Foot Complete Right; Future; Expected date: 08/21/2019         · Xrays reviewed.   · Never walk barefoot.  Darco shoe.  She does not want CAM boot  · Monitor for worsening signs and symptoms.  · follow up as scheduled.

## 2019-08-27 ENCOUNTER — PATIENT MESSAGE (OUTPATIENT)
Dept: HEPATOLOGY | Facility: CLINIC | Age: 59
End: 2019-08-27

## 2019-08-28 ENCOUNTER — HOSPITAL ENCOUNTER (OUTPATIENT)
Dept: CARDIOLOGY | Facility: CLINIC | Age: 59
Discharge: HOME | End: 2019-08-28
Attending: INTERNAL MEDICINE
Payer: MEDICARE

## 2019-08-28 ENCOUNTER — PATIENT MESSAGE (OUTPATIENT)
Dept: CARDIOLOGY | Facility: CLINIC | Age: 59
End: 2019-08-28

## 2019-08-28 VITALS
SYSTOLIC BLOOD PRESSURE: 124 MMHG | DIASTOLIC BLOOD PRESSURE: 76 MMHG | HEART RATE: 83 BPM | HEIGHT: 61 IN | WEIGHT: 157 LBS | BODY MASS INDEX: 29.64 KG/M2 | RESPIRATION RATE: 16 BRPM

## 2019-08-28 DIAGNOSIS — I25.10 CORONARY ARTERY DISEASE INVOLVING NATIVE CORONARY ARTERY OF NATIVE HEART WITHOUT ANGINA PECTORIS: ICD-10-CM

## 2019-08-28 LAB
ASCENDING AORTA: 3.17 CM
BSA FOR ECHO PROCEDURE: 1.75 M2
CV ECHO LV RWT: 0.39 CM
CV STRESS BASE HR: 87 BPM
DIASTOLIC BLOOD PRESSURE: 84 MMHG
DOP CALC LVOT AREA: 3.8 CM2
DOP CALC LVOT DIAMETER: 2.21 CM
DOP CALC LVOT PEAK VEL: 1.13 M/S
DOP CALC LVOT STROKE VOLUME: 75.15 CM3
DOP CALCLVOT PEAK VEL VTI: 19.6 CM
E WAVE DECELERATION TIME: 204.55 MSEC
E/A RATIO: 0.64
E/E' RATIO: 7 M/S
ECHO LV POSTERIOR WALL: 0.8 CM (ref 0.6–1.1)
FRACTIONAL SHORTENING: 34 % (ref 28–44)
INTERVENTRICULAR SEPTUM: 0.83 CM (ref 0.6–1.1)
IVRT: 0.11 MSEC
LA MAJOR: 4.87 CM
LA MINOR: 4.68 CM
LA WIDTH: 3.43 CM
LEFT ATRIUM SIZE: 3.37 CM
LEFT ATRIUM VOLUME INDEX: 27.5 ML/M2
LEFT ATRIUM VOLUME: 46.9 CM3
LEFT INTERNAL DIMENSION IN SYSTOLE: 2.72 CM (ref 2.1–4)
LEFT VENTRICLE DIASTOLIC VOLUME INDEX: 43.62 ML/M2
LEFT VENTRICLE DIASTOLIC VOLUME: 74.34 ML
LEFT VENTRICLE MASS INDEX: 59 G/M2
LEFT VENTRICLE SYSTOLIC VOLUME INDEX: 16.1 ML/M2
LEFT VENTRICLE SYSTOLIC VOLUME: 27.46 ML
LEFT VENTRICULAR INTERNAL DIMENSION IN DIASTOLE: 4.1 CM (ref 3.5–6)
LEFT VENTRICULAR MASS: 99.78 G
LV LATERAL E/E' RATIO: 5.44 M/S
LV SEPTAL E/E' RATIO: 9.8 M/S
MV PEAK A VEL: 0.77 M/S
MV PEAK E VEL: 0.49 M/S
OHS CV CPX 1 MINUTE RECOVERY HEART RATE: 150 BPM
OHS CV CPX 85 PERCENT MAX PREDICTED HEART RATE MALE: 132
OHS CV CPX MAX PREDICTED HEART RATE: 155
OHS CV CPX PATIENT IS FEMALE: 1
OHS CV CPX PATIENT IS MALE: 0
OHS CV CPX PEAK DIASTOLIC BLOOD PRESSURE: 69 MMHG
OHS CV CPX PEAK HEAR RATE: 150 BPM
OHS CV CPX PEAK RATE PRESSURE PRODUCT: NORMAL
OHS CV CPX PEAK SYSTOLIC BLOOD PRESSURE: 138 MMHG
OHS CV CPX PERCENT MAX PREDICTED HEART RATE ACHIEVED: 97
OHS CV CPX RATE PRESSURE PRODUCT PRESENTING: NORMAL
PISA TR MAX VEL: 2.47 M/S
PULM VEIN S/D RATIO: 1.39
PV PEAK D VEL: 0.31 M/S
PV PEAK S VEL: 0.43 M/S
RA MAJOR: 4.73 CM
RA PRESSURE: 3 MMHG
RA WIDTH: 3.19 CM
RIGHT VENTRICULAR END-DIASTOLIC DIMENSION: 2.51 CM
RV TISSUE DOPPLER FREE WALL SYSTOLIC VELOCITY 1 (APICAL 4 CHAMBER VIEW): 11.4 CM/S
SINUS: 3.1 CM
STJ: 2.86 CM
SYSTOLIC BLOOD PRESSURE: 128 MMHG
TDI LATERAL: 0.09 M/S
TDI SEPTAL: 0.05 M/S
TDI: 0.07 M/S
TR MAX PG: 24 MMHG
TRICUSPID ANNULAR PLANE SYSTOLIC EXCURSION: 1.79 CM
TV REST PULMONARY ARTERY PRESSURE: 27 MMHG

## 2019-08-28 PROCEDURE — 93351 ECHOCARDIOGRAM STRESS TEST (CUPID ONLY): ICD-10-PCS | Mod: 26,,, | Performed by: INTERNAL MEDICINE

## 2019-08-28 PROCEDURE — 93351 STRESS TTE COMPLETE: CPT | Mod: 26,,, | Performed by: INTERNAL MEDICINE

## 2019-08-28 PROCEDURE — 93351 STRESS TTE COMPLETE: CPT

## 2019-08-28 RX ORDER — ATROPINE SULFATE 0.1 MG/ML
1 INJECTION INTRAVENOUS
Status: DISCONTINUED | OUTPATIENT
Start: 2019-08-28 | End: 2019-08-28

## 2019-08-28 RX ORDER — DOBUTAMINE HYDROCHLORIDE 200 MG/100ML
10 INJECTION INTRAVENOUS
Status: COMPLETED | OUTPATIENT
Start: 2019-08-28 | End: 2019-08-28

## 2019-08-28 RX ADMIN — DOBUTAMINE HYDROCHLORIDE 10 MCG/KG/MIN: 200 INJECTION INTRAVENOUS at 04:08

## 2019-08-29 ENCOUNTER — PATIENT MESSAGE (OUTPATIENT)
Dept: RHEUMATOLOGY | Facility: CLINIC | Age: 59
End: 2019-08-29

## 2019-08-30 DIAGNOSIS — R39.15 URINARY URGENCY: ICD-10-CM

## 2019-08-30 DIAGNOSIS — R35.0 INCREASED FREQUENCY OF URINATION: ICD-10-CM

## 2019-08-30 RX ORDER — TROSPIUM CHLORIDE 20 MG/1
TABLET, FILM COATED ORAL
Qty: 180 TABLET | Refills: 3 | Status: SHIPPED | OUTPATIENT
Start: 2019-08-30 | End: 2019-09-04 | Stop reason: ALTCHOICE

## 2019-09-03 ENCOUNTER — PATIENT MESSAGE (OUTPATIENT)
Dept: SPORTS MEDICINE | Facility: CLINIC | Age: 59
End: 2019-09-03

## 2019-09-04 ENCOUNTER — OFFICE VISIT (OUTPATIENT)
Dept: UROLOGY | Facility: CLINIC | Age: 59
End: 2019-09-04
Payer: MEDICARE

## 2019-09-04 VITALS
HEART RATE: 98 BPM | DIASTOLIC BLOOD PRESSURE: 76 MMHG | SYSTOLIC BLOOD PRESSURE: 110 MMHG | WEIGHT: 158.75 LBS | BODY MASS INDEX: 29.97 KG/M2 | HEIGHT: 61 IN

## 2019-09-04 DIAGNOSIS — R33.9 INCOMPLETE EMPTYING OF BLADDER: ICD-10-CM

## 2019-09-04 DIAGNOSIS — K31.84 GASTROPARESIS: ICD-10-CM

## 2019-09-04 DIAGNOSIS — N39.0 RECURRENT UTI: Primary | ICD-10-CM

## 2019-09-04 PROCEDURE — 99213 PR OFFICE/OUTPT VISIT, EST, LEVL III, 20-29 MIN: ICD-10-PCS | Mod: 25,S$GLB,, | Performed by: UROLOGY

## 2019-09-04 PROCEDURE — 51701 PR INSERTION OF NON-INDWELLING BLADDER CATHETERIZATION FOR RESIDUAL UR: ICD-10-PCS | Mod: S$GLB,,, | Performed by: UROLOGY

## 2019-09-04 PROCEDURE — 51701 INSERT BLADDER CATHETER: CPT | Mod: S$GLB,,, | Performed by: UROLOGY

## 2019-09-04 PROCEDURE — 99999 PR PBB SHADOW E&M-EST. PATIENT-LVL V: ICD-10-PCS | Mod: PBBFAC,,, | Performed by: UROLOGY

## 2019-09-04 PROCEDURE — 3008F BODY MASS INDEX DOCD: CPT | Mod: CPTII,S$GLB,, | Performed by: UROLOGY

## 2019-09-04 PROCEDURE — 81002 PR URINALYSIS NONAUTO W/O SCOPE: ICD-10-PCS | Mod: S$GLB,,, | Performed by: UROLOGY

## 2019-09-04 PROCEDURE — 87086 URINE CULTURE/COLONY COUNT: CPT

## 2019-09-04 PROCEDURE — 81002 URINALYSIS NONAUTO W/O SCOPE: CPT | Mod: S$GLB,,, | Performed by: UROLOGY

## 2019-09-04 PROCEDURE — 99213 OFFICE O/P EST LOW 20 MIN: CPT | Mod: 25,S$GLB,, | Performed by: UROLOGY

## 2019-09-04 PROCEDURE — 99999 PR PBB SHADOW E&M-EST. PATIENT-LVL V: CPT | Mod: PBBFAC,,, | Performed by: UROLOGY

## 2019-09-04 PROCEDURE — 3008F PR BODY MASS INDEX (BMI) DOCUMENTED: ICD-10-PCS | Mod: CPTII,S$GLB,, | Performed by: UROLOGY

## 2019-09-04 RX ORDER — DOXYCYCLINE HYCLATE 100 MG
100 TABLET ORAL ONCE
Status: CANCELLED | OUTPATIENT
Start: 2019-09-04 | End: 2019-09-04

## 2019-09-04 RX ORDER — AMPICILLIN 500 MG/1
CAPSULE ORAL
Refills: 2 | COMMUNITY
Start: 2019-08-12 | End: 2020-02-28

## 2019-09-04 RX ORDER — LIDOCAINE HYDROCHLORIDE 20 MG/ML
JELLY TOPICAL ONCE
Status: CANCELLED | OUTPATIENT
Start: 2019-09-04 | End: 2019-09-04

## 2019-09-04 NOTE — PATIENT INSTRUCTIONS
Urodynamics Studies     The bladder holds urine until it leaves the body through the urethra.     Urodynamics studies are a series of tests that give your doctor a close look at the working of your bladder and urethra. The tests can help your doctor learn about any problems storing urine or voiding (eliminating) urine from your body.  Understanding the lower urinary tract  The lower part of the urinary tract has several parts.  · The bladder stores urine until youre ready to release it.  · The urethra is the tube that carries urine from the bladder out of the body.  · The sphincter is made up of muscles around the opening of the bladder. The sphincter muscles tighten to hold urine in the bladder. They relax to let urine flow. Signals from the brain tell the sphincter when to tighten and relax. These signals also tell the bladder when to contract to let urine flow out of the body.  Why you need a urodynamics study  This test may be ordered if you:  · Are incontinent (leak urine)  · Have a bladder that does not empty all the way.  · Have symptoms such as the need to urinate often or a constant strong need to urinate  · Have intermittent or weak urine stream  · Have persistent urinary tract infections  Preparing for the study  · Tell your doctor about any medicine youre taking. Ask if you should stop them before the study.  · Keep a diary of your bathroom habits. Do this for a few days before the study. This diary can be a helpful part of the evaluation.  · Ask if you need to arrive for the study with a full bladder.  Date Last Reviewed: 1/1/2017  © 5373-2859 [employer] [address] [place] [address], [place] [address]. All rights reserved. This information is not intended as a substitute for professional medical care. Always follow your healthcare professional's instructions.          Urodynamic Studies     The equipment used for the study varies depending upon the facility and what tests are done.      Urodynamic studies may be done in your doctors office, a clinic, or a hospital. The studies may take up to an hour or more. This depends on which tests your doctor does. The tests are generally painless. You wont need sedating medicine.  Tests that may be done  Uroflowmetry. This measures the amount and speed of urine you void from your bladder. You urinate into a funnel. Its attached to a computer that records your urine flow over time. The amount of urine left in your bladder after you void may also be measured right after this test.  Cystometry. This test evaluates how much your bladder can hold. It also measures how strong your bladder muscle is and how well the signals work that tell you when your bladder is full. Your healthcare provider fills your bladder with sterile water or saline solution, through a catheter. Your doctor will instruct you to report any sensations you feel. Mention if theyre similar to symptoms youve felt at home. Your doctor may ask you to cough, stand and walk, or bear down during this test.  Electromyogram. This helps evaluate the muscle contractions that control urination, such as sphincter muscle contractions. Your healthcare provider may place electrode patches or wires near your rectum or urethra to make the recording. He or she may ask you to try to tighten or relax your sphincter muscles during this test.  Pressure flow study. This test measures your detrusor, urethral, and abdominal pressures. Detrusor is the muscle surrounding the bladder walls that relaxes to allow your bladder to fill, and and contracts to squeeze out urine. A pressure flow study is often done after cystometry. Youre asked to urinate while a probe in your urethra measures pressures.  Video cystourethrography. This takes video pictures of urine flow through your urinary tract. It can help identify blockages or other problems. The bladder is filled with an X-ray contrast fluid. Then X-ray video  pictures are taken as the fluid is urinated out. Ultrasound imaging may also be combined with routine urodynamic studies.  Ambulatory urodynamics. This test can be used to evaluate you while doing usual activities.  Getting your results  After the study, youll get dressed and return to the consultation room. Test results may be ready soon after the study is finished. Or, you may return to your doctors office in a few days for your results. Your doctor can talk with you about the study report and your options.   Date Last Reviewed: 1/1/2017  © 8039-4487 [employer]. [address] [place] [address]. All rights reserved. This information is not intended as a substitute for professional medical care. Always follow your healthcare professional's instructions.

## 2019-09-04 NOTE — PROGRESS NOTES
CHIEF COMPLAINT:    Mrs. Peterson is a 58 y.o. female presenting for an urgent visit for recurrent UTI    PRESENTING ILLNESS:    Joyce Peterson is a 58 y.o. female who returns stating that she has had 3 urinary tract infections since July 3rd.  She was treated for the first with ampicillin, on 7/25/2019 with clindamycin and on 8/13/2019 again with ampicillin.  She states she sees Dr. Keshia Calderon who is an outside OBGYN who recommended that she return here for further recommendations.  She states that there were positive cultures for each of the times she had a UTI.  She completed her last antibiotic over 1 week ago.  She states she is symptomatic today    Her symptoms are atypical.  Deep pelvic pain, she feels like her abdomen swells, has achiness, feels feverish but when she takes her temp, she is afebrile.  She states she read the package insert on the Kevzara which notes an increase in respiratory and urinary tract infections.  She is concerned as she has little in the way for treatment for her rheumatoid arthritis.  She was treated for many years with corticosteroids.   She cannot take the Kevzara when she is on antibiotics so treatment has been sporadic for the past few months.     She had a right sided ureterocele on her cystoscopy on 7/9/2018.  Her last renal ultrasound was on 8/21/2018 showed no masses.      REVIEW OF SYSTEMS:    Review of Systems   Constitutional: Negative.    HENT: Negative.    Eyes: Negative.    Respiratory: Negative.    Cardiovascular: Negative.    Gastrointestinal: Positive for heartburn and nausea (longstanding gastroparesis).        Bloating   Genitourinary:        Pelvic pain   Musculoskeletal: Positive for joint pain and myalgias.   Skin: Negative.    Neurological: Negative.    Endo/Heme/Allergies: Negative.    Psychiatric/Behavioral: The patient is nervous/anxious.        PATIENT HISTORY:    Past Medical History:   Diagnosis Date    Acid reflux     Allergy     Anemia      Asthma     Coronary artery disease     Degenerative disc disease     Dry eyes     Dry mouth     Gastroparesis     Hyperlipidemia     Lateral meniscus derangement 4/6/2016    Lobular carcinoma in situ     Osteoarthritis     Osteoporosis     Rheumatoid arthritis(714.0)     Umbilical hernia 8/13/2015       Past Surgical History:   Procedure Laterality Date    ARTHROSCOPY-KNEE Right 4/6/2016    [facility] OR 2ND FLR    BREAST BIOPSY Left 01/29/2002    core bx    CARPAL TUNNEL RELEASE Right 05/2017    CHOLECYSTECTOMY  2004    COLONOSCOPY      10/11    COLONOSCOPY N/A 6/29/2017    Performed by L[facility]pez Moore MD at [facility] ENDO (4TH FLR)    ESOPHAGOGASTRODUODENOSCOPY (EGD) N/A 6/29/2017    Performed [facility] at [facility] ENDO (4TH FLR)    INSERTION, INTRAMEDULLARY ARIEL, FEMUR Left 5/21/2019    Performed by L[facility]pez Duff MD at [facility] OR 2ND FLR    RELEASE-CARPAL TUNNEL / Right Right 5/29/2017    [facility] OR    REPAIR, TENDON, TRICEPS left elbow Left 10/17/2018    Per[facility] at [facility] OR 1ST FLR    REPAIR-HERNIA-UMBILICAL N/A 8/13/2015    Performed by Ricardo Small MD at [facility] OR 2ND FLR    TONSILLECTOMY      TUBAL LIGATION  2003    UPPER GASTROINTESTINAL ENDOSCOPY      10/11    uterine ablation  2003       Family History   Problem Relation Age of Onset    Cancer Mother         Lung Cancer    Emphysema Mother     Heart attack Mother     Cancer Father         Lung Cancer    Osteoarthritis Father     Lung cancer Father     Skin cancer Father     Heart disease Brother     Heart attack Brother     Osteoarthritis Paternal Aunt     Retinal detachment Maternal Aunt      Socioeconomic History    Marital status:    Tobacco Use    Smoking status: Never Smoker    Smokeless tobacco: Never Used   Substance and Sexual Activity    Alcohol use: Yes     Comment: occasionally    Drug  use: No    Sexual activity: Yes     Partners: Male     Birth control/protection: Surgical       Allergies:  Actemra [tocilizumab]; Codeine; Gold au 198; Hydroxychloroquine; Iodinated contrast media; Iodine; Sulfa (sulfonamide antibiotics); Zofran [ondansetron hcl (pf)]; Methotrexate analogues; and Pneumovax 23 [pneumococcal 23-argenis ps vaccine]    Medications:  Outpatient Encounter Medications as of 9/4/2019   Medication Sig Dispense Refill    albuterol (ACCUNEB) 1.25 mg/3 mL Nebu Take 3 mLs (1.25 mg total) by nebulization every 6 (six) hours as needed. Rescue 1 Box 2    albuterol (PROVENTIL HFA) 90 mcg/actuation inhaler Inhale 2 puffs into the lungs every 6 (six) hours as needed. Rescue 20.1 Inhaler 0    ampicillin (PRINCIPEN) 500 MG capsule TAKE 1 CAPUSLE BY MOUTH EVERY 6 HOURS  2    aspirin 81 mg Tab Take 81 mg by mouth every morning.       azelastine (ASTELIN) 137 mcg nasal spray 1 spray (137 mcg total) by Nasal route 2 (two) times daily. (Patient taking differently: 1 spray by Nasal route 2 (two) times daily as needed. ) 30 mL 11    budesonide-formoterol 160-4.5 mcg (SYMBICORT) 160-4.5 mcg/actuation HFAA Inhale 2 puffs into the lungs every 12 (twelve) hours. 3 Inhaler 3    calcium citrate-vitamin D (CITRACAL + D) 315-200 mg-unit per tablet Take 1 tablet by mouth 2 (two) times daily.       diclofenac sodium (VOLTAREN) 1 % Gel Apply 2 g topically 4 (four) times daily as needed. 500 g 5    fluconazole (DIFLUCAN) 100 MG tablet Take 100 mg by mouth once daily.  3    fluticasone propionate (FLONASE) 50 mcg/actuation nasal spray 2 sprays (100 mcg total) by Each Nare route once daily. 1 Bottle 3    GUAIFENESIN (MUCINEX ORAL) Take 400 mg by mouth every 4 (four) hours as needed.       INV PROPULSID 10 MG Take 1 tablets (20 mg) by mouth 4 (four) times daily. FOR INVESTIGATIONAL USE ONLY. Protocol CIS-USA-154 1320 each 0    leflunomide (ARAVA) 20 MG Tab Take 1 tablet (20 mg total) by mouth once daily. 30 tablet  "2    montelukast (SINGULAIR) 10 mg tablet Take 1 tablet (10 mg total) by mouth nightly. 90 tablet 3    naproxen (NAPROSYN) 500 MG tablet TAKE 1 TABLET TWICE A DAY AS NEEDED 180 tablet 0    omeprazole (PRILOSEC) 40 MG capsule Take 1 capsule (40 mg total) by mouth every morning. 30 capsule 6    omeprazole-sodium bicarbonate (ZEGERID) 40-1.1 mg-gram per capsule TAKE 1 CAPSULE BY MOUTH EVERY MORNING. 90 capsule 3    POTASSIUM ORAL Take by mouth once daily.      predniSONE (DELTASONE) 1 MG tablet Take 1 tablet (1 mg total) by mouth once daily. 90 tablet 1    predniSONE (DELTASONE) 5 MG tablet TAKE 1 TABLET BY MOUTH EVERY DAY 90 tablet 1    PREMARIN 0.625 mg tablet Take 0.625 mg by mouth once daily.  4    PREMARIN vaginal cream PLACE 0.5 G VAGINALLY 3 (THREE) TIMES A WEEK. 30 g 3    progesterone (PROMETRIUM) 100 MG capsule Take 100 mg by mouth every morning. 1 Capsule Oral Every day, morning      RESTASIS 0.05 % ophthalmic emulsion PLACE 0.4 MLS (1 DROP TOTAL) INTO BOTH EYES 2 (TWO) TIMES DAILY.  5    rizatriptan (MAXALT) 10 MG tablet Take 10 mg by mouth as needed for Migraine.       rosuvastatin (CRESTOR) 20 MG tablet Take 1 tablet (20 mg total) by mouth every evening. 90 tablet 3    sarilumab (KEVZARA) 200 mg/1.14 mL Syrg Inject 1.14 mLs (200 mg total) into the skin every 14 (fourteen) days. 2.28 mL 2    sucralfate (CARAFATE) 1 gram tablet TAKE 1 TABLET (1 G TOTAL) BY MOUTH 4 (FOUR) TIMES DAILY. 360 tablet 3    syringe with needle (TUBERCULIN SYRINGE) 1 mL 27 x 1/2" Syrg To administer methotrexate 7.5mg sc once a week 12 Syringe 3    valACYclovir (VALTREX) 1000 MG tablet TAKE 1 TABLET BY MOUTH TWICE A DAY AS NEEDED 20 tablet 3    [DISCONTINUED] trospium (SANCTURA) 20 mg Tab tablet TAKE 1 TABLET BY MOUTH TWICE A DAY. DUE FOR FOLLOW UP IN JULY 180 tablet 3     Facility-Administered Encounter Medications as of 9/4/2019   Medication Dose Route Frequency Provider Last Rate Last Dose    0.9%  NaCl infusion  "  Intravenous Continuous Callum Singre MD   Stopped at 05/21/19 1100         PHYSICAL EXAMINATION:    The patient generally appears in good health, is appropriately interactive, and is in no apparent distress.    Skin: No lesions.    Mental: Cooperative with normal affect.    Neuro: Grossly intact.    HEENT: Normal. No evidence of lymphadenopathy.    Chest:  normal inspiratory effort.    Abdomen:  Soft, non-tender. No masses or organomegaly. Bladder is not palpable. No evidence of flank discomfort. No evidence of inguinal hernia.    Extremities: No clubbing, cyanosis, or edema    Normal external female genitalia  Urethral meatus is normal  Urethra and bladder are nontender to bimanual exam  Well supported posteriorly   Anteriorly, she has a stage II cystocele  Uterus and cervix are normal  No adnexal masses  PVR by catheterization was 300 ml    LABS:    Lab Results   Component Value Date    BUN 15 07/11/2019    CREATININE 0.80 07/11/2019     UA 1.010, PH 7, tr protein, otherwise, negative    IMPRESSION:    Encounter Diagnoses   Name Primary?    Recurrent UTI Yes    Incomplete emptying of bladder     Gastroparesis        PLAN:    1. She has longstanding gastroparesis, could have autonomic dysfunction as an etiology for the incomplete bladder emptying  2. Will repeat a renal ultrasound  3.  Urodynamics with reduction of the cystocele

## 2019-09-06 ENCOUNTER — PATIENT MESSAGE (OUTPATIENT)
Dept: UROLOGY | Facility: CLINIC | Age: 59
End: 2019-09-06

## 2019-09-06 ENCOUNTER — HOSPITAL ENCOUNTER (OUTPATIENT)
Dept: RADIOLOGY | Facility: HOSPITAL | Age: 59
Discharge: HOME | End: 2019-09-06
Attending: UROLOGY
Payer: MEDICARE

## 2019-09-06 DIAGNOSIS — N39.0 RECURRENT UTI: ICD-10-CM

## 2019-09-06 LAB — BACTERIA UR CULT: NORMAL

## 2019-09-06 PROCEDURE — 76770 US EXAM ABDO BACK WALL COMP: CPT | Mod: 26,,, | Performed by: RADIOLOGY

## 2019-09-06 PROCEDURE — 76770 US KIDNEY: ICD-10-PCS | Mod: 26,,, | Performed by: RADIOLOGY

## 2019-09-06 PROCEDURE — 76770 US EXAM ABDO BACK WALL COMP: CPT | Mod: TC

## 2019-09-06 NOTE — TELEPHONE ENCOUNTER
Called the patient and discussed the results.  Advised that she undergo the suds since she has the incomplete bladder emptying.

## 2019-09-08 RX ORDER — OMEPRAZOLE AND SODIUM BICARBONATE 40; 1100 MG/1; MG/1
1 CAPSULE ORAL EVERY MORNING
Qty: 90 CAPSULE | Refills: 3 | Status: SHIPPED | OUTPATIENT
Start: 2019-09-08 | End: 2020-08-25 | Stop reason: SDUPTHER

## 2019-09-09 ENCOUNTER — OFFICE VISIT (OUTPATIENT)
Dept: SPORTS MEDICINE | Facility: CLINIC | Age: 59
End: 2019-09-09
Payer: MEDICARE

## 2019-09-09 ENCOUNTER — HOSPITAL ENCOUNTER (OUTPATIENT)
Dept: RADIOLOGY | Facility: HOSPITAL | Age: 59
Discharge: HOME | End: 2019-09-09
Attending: PHYSICIAN ASSISTANT
Payer: MEDICARE

## 2019-09-09 ENCOUNTER — DOCUMENTATION ONLY (OUTPATIENT)
Dept: UROLOGY | Facility: CLINIC | Age: 59
End: 2019-09-09

## 2019-09-09 ENCOUNTER — PATIENT OUTREACH (OUTPATIENT)
Dept: ADMINISTRATIVE | Facility: OTHER | Age: 59
End: 2019-09-09

## 2019-09-09 VITALS
WEIGHT: 158 LBS | DIASTOLIC BLOOD PRESSURE: 79 MMHG | HEART RATE: 103 BPM | HEIGHT: 61 IN | BODY MASS INDEX: 29.83 KG/M2 | SYSTOLIC BLOOD PRESSURE: 117 MMHG

## 2019-09-09 DIAGNOSIS — Z87.312 HISTORY OF STRESS FRACTURE: ICD-10-CM

## 2019-09-09 DIAGNOSIS — M25.551 RIGHT HIP PAIN: ICD-10-CM

## 2019-09-09 DIAGNOSIS — M25.551 RIGHT HIP PAIN: Primary | ICD-10-CM

## 2019-09-09 PROCEDURE — 73502 X-RAY EXAM HIP UNI 2-3 VIEWS: CPT | Mod: TC,FY,PO,RT

## 2019-09-09 PROCEDURE — 99999 PR PBB SHADOW E&M-EST. PATIENT-LVL V: ICD-10-PCS | Mod: PBBFAC,,, | Performed by: PHYSICIAN ASSISTANT

## 2019-09-09 PROCEDURE — 99214 OFFICE O/P EST MOD 30 MIN: CPT | Mod: S$GLB,,, | Performed by: PHYSICIAN ASSISTANT

## 2019-09-09 PROCEDURE — 73502 XR HIP 2 VIEW RIGHT: ICD-10-PCS | Mod: 26,RT,, | Performed by: RADIOLOGY

## 2019-09-09 PROCEDURE — 3008F BODY MASS INDEX DOCD: CPT | Mod: CPTII,S$GLB,, | Performed by: PHYSICIAN ASSISTANT

## 2019-09-09 PROCEDURE — 3008F PR BODY MASS INDEX (BMI) DOCUMENTED: ICD-10-PCS | Mod: CPTII,S$GLB,, | Performed by: PHYSICIAN ASSISTANT

## 2019-09-09 PROCEDURE — 99999 PR PBB SHADOW E&M-EST. PATIENT-LVL V: CPT | Mod: PBBFAC,,, | Performed by: PHYSICIAN ASSISTANT

## 2019-09-09 PROCEDURE — 73502 X-RAY EXAM HIP UNI 2-3 VIEWS: CPT | Mod: 26,RT,, | Performed by: RADIOLOGY

## 2019-09-09 PROCEDURE — 99214 PR OFFICE/OUTPT VISIT, EST, LEVL IV, 30-39 MIN: ICD-10-PCS | Mod: S$GLB,,, | Performed by: PHYSICIAN ASSISTANT

## 2019-09-09 RX ORDER — DIAZEPAM 10 MG/1
10 TABLET ORAL DAILY PRN
Qty: 1 TABLET | Refills: 0 | Status: SHIPPED | OUTPATIENT
Start: 2019-09-09 | End: 2019-10-31

## 2019-09-09 NOTE — PROGRESS NOTES
Labs from Dr. Keshia Calderon office were obtained.      Urine culture from   8/7/2019 Group B strep 10-25K U/ml was treated with ampicillin 500 mg q6h, x 10 days  7/22/2019 Group B strep 25-50K U/ml, treated with Clindamycin 400 mg tid x 7 days  7/1/2019 Group B strep 25-50K U/ml, treated with ampicillin 500 mg q6h, x 7 days    Sent to scanner

## 2019-09-09 NOTE — PROGRESS NOTES
CC: right hip pain    HPI:   Joyce Peterson is a pleasant 58 y.o. patient who reports to clinic with right hip pain. No [facility]     Presents to clinic with 1 month of anterior lateral right hip pain that is gradually worsening despite conservative management. Pain is severe and feels identical to left hip pain which was underlying stress fracture. Standing and laying on the hip makes the pain worse. She has tried tylenol, rest, and HEP with no pain improvement. She is concerned for possible hip stress fracture especially due to her copious amounts of stress fractures previously. She is currently wearing a boot on her right foot for likely stress fracture. She has been using the boot for 3 weeks now.    Pain is affecting ADLs.      Previous left hip surgery for chronic stress fracture on 5/21/2019. doing great  OPERATIVE NOTE     DOS:               05/21/2019     Preop Dx:        Left intertrochanteric femur fracture     Postop Dx:      Left intertrochanteric femur fracture     Procedure:      Cephalomedullary nail fixation of left intertrochanteric femur fracture - 27420       She has a PMH of Rheumatoid Arthritis see by Dr. Moran.  She is on chronic prednisone treatment currently on 5 mg daily   PMH of 9 stress fractures in the past to various areas.     Medical history notable for osteopenia (on Prolia (denosumab) and citracal+D, last dexa 2/28/19 T-score -2.1 lumbar vertebra, -1.1 left femoral neck) and RA (on Kevzara and prednisone 5 mg)    Review of Systems   Constitution: Negative. Negative for chills, fever and night sweats.   HENT: Negative for congestion and headaches.    Eyes: Negative for blurred vision, left vision loss and right vision loss.   Cardiovascular: Negative for chest pain and syncope.   Respiratory: Negative for cough and shortness of breath.    Endocrine: Negative for polydipsia, polyphagia and polyuria.   Hematologic/Lymphatic: Negative for bleeding problem.  Does not bruise/bleed easily.   Skin: Negative for dry skin, itching and rash.   Musculoskeletal: Negative for falls and muscle weakness.   Gastrointestinal: Negative for abdominal pain and bowel incontinence.   Genitourinary: Negative for bladder incontinence and nocturia.   Neurological: Negative for disturbances in coordination, loss of balance and seizures.   Psychiatric/Behavioral: Negative for depression. The patient does not have insomnia.    Allergic/Immunologic: Negative for hives and persistent infections.   All other systems negative.    PAST MEDICAL HISTORY:   Past Medical History:   Diagnosis Date    Acid reflux     Allergy     Anemia     Asthma     Coronary artery disease     Degenerative disc disease     Dry eyes     Dry mouth     Gastroparesis     Hyperlipidemia     Lateral meniscus derangement 4/6/2016    Lobular carcinoma in situ     Osteoarthritis     Osteoporosis     Rheumatoid arthritis(714.0)     Umbilical hernia 8/13/2015     PAST SURGICAL HISTORY:   Past Surgical History:   Procedure Laterality Date    ARTHROSCOPY-KNEE Right 4/6/2016    Performed by [facility] Jr[facility] at [facility] OR 2ND FLR    BREAST BIOPSY Left 01/29/2002    core bx    CARPAL TUNNEL RELEASE Right 05/2017    CHOLECYSTECTOMY  2004    COLONOSCOPY      10/11    COLONOSCOPY N/A 6/29/2017    Performed by López Moore MD at [facility] ENDO (4TH FLR)    ESOPHAGOGASTRODUODENOSCOPY (EGD) N/A 6/29/2017    Performed by L[facility] Moore [facility] at [facility] ENDO (4TH FLR)    INSERTION, INTRAMEDULLARY ARIEL, FEMUR Left 5/21/2019    Performed by López Duff MD at [facility] OR 2ND FLR    RELEASE-CARPAL TUNNEL / Right Right 5/29/2017    [facility]    REPAIR, TENDON, TRICEPS left elbow Left 10/17/2018    Performed by St[facility]rough [facility] at [facility] OR 1ST FLR    REPAIR-HERNIA-UMBILICAL N/A 8/13/2015    Performed by Ricardo Small MD at [facility] OR 2ND FLR    TONSILLECTOMY      TUBAL  LIGATION  2003    UPPER GASTROINTESTINAL ENDOSCOPY      10/11    uterine ablation  2003     FAMILY HISTORY:   Family History   Problem Relation Age of Onset    Cancer Mother         Lung Cancer    Emphysema Mother     Heart attack Mother     Cancer Father         Lung Cancer    Osteoarthritis Father     Lung cancer Father     Skin cancer Father     Heart disease Brother     Heart attack Brother     Osteoarthritis Paternal Aunt     Retinal detachment Maternal Aunt     No Known Problems Sister     No Known Problems Maternal Uncle     No Known Problems Paternal Uncle     No Known Problems Maternal Grandmother     No Known Problems Maternal Grandfather     No Known Problems Paternal Grandmother     No Known Problems Paternal Grandfather     Colon cancer Neg Hx     Esophageal cancer Neg Hx     Stomach cancer Neg Hx     Celiac disease Neg Hx     Diabetes Neg Hx     Thyroid disease Neg Hx     Amblyopia Neg Hx     Blindness Neg Hx     Cataracts Neg Hx     Glaucoma Neg Hx     Hypertension Neg Hx     Macular degeneration Neg Hx     Strabismus Neg Hx     Stroke Neg Hx      SOCIAL HISTORY:   Social History     Socioeconomic History    Marital status:      Spouse name: Not on file    Number of children: Not on file    Years of education: Not on file    Highest education level: Not on file   Occupational History    Not on file   Social Needs    Financial resource strain: Not on file    Food insecurity:     Worry: Not on file     Inability: Not on file    Transportation needs:     Medical: Not on file     Non-medical: Not on file   Tobacco Use    Smoking status: Never Smoker    Smokeless tobacco: Never Used   Substance and Sexual Activity    Alcohol use: Yes     Comment: occasionally    Drug use: No    Sexual activity: Yes     Partners: Male     Birth control/protection: Surgical   Lifestyle    Physical activity:     Days per week: Not on file     Minutes per session: Not on  file    Stress: Not on file   Relationships    Social connections:     Talks on phone: Not on file     Gets together: Not on file     Attends Oriental orthodox service: Not on file     Active member of club or organization: Not on file     Attends meetings of clubs or organizations: Not on file     Relationship status: Not on file   Other Topics Concern    Are you pregnant or think you may be? Not Asked    Breast-feeding Not Asked   Social History Narrative    Not on file       MEDICATIONS:   Current Outpatient Medications:     albuterol (ACCUNEB) 1.25 mg/3 mL Nebu, Take 3 mLs (1.25 mg total) by nebulization every 6 (six) hours as needed. Rescue, Disp: 1 Box, Rfl: 2    albuterol (PROVENTIL HFA) 90 mcg/actuation inhaler, Inhale 2 puffs into the lungs every 6 (six) hours as needed. Rescue, Disp: 20.1 Inhaler, Rfl: 0    ampicillin (PRINCIPEN) 500 MG capsule, TAKE 1 CAPUSLE BY MOUTH EVERY 6 HOURS, Disp: , Rfl: 2    aspirin 81 mg Tab, Take 81 mg by mouth every morning. , Disp: , Rfl:     azelastine (ASTELIN) 137 mcg nasal spray, 1 spray (137 mcg total) by Nasal route 2 (two) times daily. (Patient taking differently: 1 spray by Nasal route 2 (two) times daily as needed. ), Disp: 30 mL, Rfl: 11    budesonide-formoterol 160-4.5 mcg (SYMBICORT) 160-4.5 mcg/actuation HFAA, Inhale 2 puffs into the lungs every 12 (twelve) hours., Disp: 3 Inhaler, Rfl: 3    calcium citrate-vitamin D (CITRACAL + D) 315-200 mg-unit per tablet, Take 1 tablet by mouth 2 (two) times daily. , Disp: , Rfl:     diclofenac sodium (VOLTAREN) 1 % Gel, Apply 2 g topically 4 (four) times daily as needed., Disp: 500 g, Rfl: 5    fluconazole (DIFLUCAN) 100 MG tablet, Take 100 mg by mouth once daily., Disp: , Rfl: 3    fluticasone propionate (FLONASE) 50 mcg/actuation nasal spray, 2 sprays (100 mcg total) by Each Nare route once daily., Disp: 1 Bottle, Rfl: 3    GUAIFENESIN (MUCINEX ORAL), Take 400 mg by mouth every 4 (four) hours as needed. , Disp: ,  Rfl:     INV PROPULSID 10 MG, Take 1 tablets (20 mg) by mouth 4 (four) times daily. FOR INVESTIGATIONAL USE ONLY. Protocol CIS-USA-154, Disp: 1320 each, Rfl: 0    leflunomide (ARAVA) 20 MG Tab, Take 1 tablet (20 mg total) by mouth once daily., Disp: 30 tablet, Rfl: 2    montelukast (SINGULAIR) 10 mg tablet, Take 1 tablet (10 mg total) by mouth nightly., Disp: 90 tablet, Rfl: 3    naproxen (NAPROSYN) 500 MG tablet, TAKE 1 TABLET TWICE A DAY AS NEEDED, Disp: 180 tablet, Rfl: 0    omeprazole (PRILOSEC) 40 MG capsule, Take 1 capsule (40 mg total) by mouth every morning., Disp: 30 capsule, Rfl: 6    omeprazole-sodium bicarbonate (ZEGERID) 40-1.1 mg-gram per capsule, TAKE 1 CAPSULE BY MOUTH EVERY MORNING., Disp: 90 capsule, Rfl: 3    POTASSIUM ORAL, Take by mouth once daily., Disp: , Rfl:     predniSONE (DELTASONE) 1 MG tablet, Take 1 tablet (1 mg total) by mouth once daily., Disp: 90 tablet, Rfl: 1    predniSONE (DELTASONE) 5 MG tablet, TAKE 1 TABLET BY MOUTH EVERY DAY, Disp: 90 tablet, Rfl: 1    PREMARIN 0.625 mg tablet, Take 0.625 mg by mouth once daily., Disp: , Rfl: 4    PREMARIN vaginal cream, PLACE 0.5 G VAGINALLY 3 (THREE) TIMES A WEEK., Disp: 30 g, Rfl: 3    progesterone (PROMETRIUM) 100 MG capsule, Take 100 mg by mouth every morning. 1 Capsule Oral Every day, morning, Disp: , Rfl:     RESTASIS 0.05 % ophthalmic emulsion, PLACE 0.4 MLS (1 DROP TOTAL) INTO BOTH EYES 2 (TWO) TIMES DAILY., Disp: , Rfl: 5    rizatriptan (MAXALT) 10 MG tablet, Take 10 mg by mouth as needed for Migraine. , Disp: , Rfl:     rosuvastatin (CRESTOR) 20 MG tablet, Take 1 tablet (20 mg total) by mouth every evening., Disp: 90 tablet, Rfl: 3    sarilumab (KEVZARA) 200 mg/1.14 mL Syrg, Inject 1.14 mLs (200 mg total) into the skin every 14 (fourteen) days., Disp: 2.28 mL, Rfl: 2    sucralfate (CARAFATE) 1 gram tablet, TAKE 1 TABLET (1 G TOTAL) BY MOUTH 4 (FOUR) TIMES DAILY., Disp: 360 tablet, Rfl: 3    syringe with needle  "(TUBERCULIN SYRINGE) 1 mL 27 x 1/2" Syrg, To administer methotrexate 7.5mg sc once a week, Disp: 12 Syringe, Rfl: 3    valACYclovir (VALTREX) 1000 MG tablet, TAKE 1 TABLET BY MOUTH TWICE A DAY AS NEEDED, Disp: 20 tablet, Rfl: 3    diazePAM (VALIUM) 10 MG Tab, Take 1 tablet (10 mg total) by mouth daily as needed (30 minutes prior to procedure to help anxiety)., Disp: 1 tablet, Rfl: 0  No current facility-administered medications for this visit.     Facility-Administered Medications Ordered in Other Visits:     0.9%  NaCl infusion, , Intravenous, Continuous, Callum Singer MD, Stopped at 05/21/19 1100  ALLERGIES:   Review of patient's allergies indicates:   Allergen Reactions    Actemra [tocilizumab] Other (See Comments)     Severe dizziness    Codeine Nausea And Vomiting    Gold au 198 Hives and Rash    Hydroxychloroquine Other (See Comments)     Can't remember the reaction      Iodinated contrast media Other (See Comments)     Other reaction(s): BURNING ALL OVER    Iodine Other (See Comments)     Other reaction(s): BURNING ALL OVER - Iodine dye - Not topical    Sulfa (sulfonamide antibiotics) Other (See Comments)     Can't remember the reaction    Zofran [ondansetron hcl (pf)] Nausea And Vomiting     Pt reports that last time she received zofran she started vomiting again    Methotrexate analogues Nausea Only    Pneumovax 23 [pneumococcal 23-argenis ps vaccine] Other (See Comments)     "sick"       VITAL SIGNS: /79   Pulse 103   Ht 5' 1" (1.549 m)   Wt 71.7 kg (158 lb)   LMP  (LMP Unknown)   BMI 29.85 kg/m²        PHYSICAL EXAM /  HIP  PHYSICAL EXAMINATION  General:  The patient is alert and oriented x 3.  Mood is pleasant.  Observation of ears, eyes and nose reveal no gross abnormalities.  HEENT: NCAT, sclera nonicteric  Lungs: Respirations are equal and unlabored..    Right HIP EXAMINATION     OBSERVATION / INSPECTION  Gait:   Nonantalgic   Alignment:  Neutral   Scars:   None   Muscle " atrophy: None   Effusion:  None   Warmth:  None   Discoloration:   None   Leg lengths:   Equal   Pelvis:   Level     TENDERNESS / CREPITUS (T/C):      T / C  Trochanteric bursa    + / -  Piriformis    - / -  SI joint    - / -  Psoas tendon   - / -  Rectus insertion  - / -  Adductor origin  -/ -  Pubic symphysis  - / -  IT band                                   + / -  Gluteus tendons                     - / -    ROM: (* = pain)    Flexion:    120 degrees  External rotation: 40 degrees  Internal rotation with axial load: 20 degrees*  Internal rotation without axial load: 30 degrees  Abduction:  40 degrees*  Adduction:   20 degrees    SPECIAL TESTS:  Pain w/ forced internal rotation (FADIR): -   Pain w/ forced external rotation (SANDRA): -   Circumduction test:    -  Stinchfield test:    -  Log roll:      Negative   Snapping hip (internal):   Negative   Step-down test:    +  Trendelenburg test:    Negative  Bridge test     +     EXTREMITY NEURO-VASCULAR EXAMINATION:   Sensation:  Grossly intact to light touch all dermatomal regions.   Motor Function:  Fully intact motor function at hip, knee, foot and ankle    DTRs;  quadriceps and  achilles 2+.  No clonus and downgoing Babinski.    Vascular status:  DP and PT pulses 2+, brisk capillary refill, symmetric.    Skin:  intact, compartments soft.    OTHER FINDINGS:    XRAYS:  2 hip/pelvis views were independently reviewed and interpreted by myself.  No fracture, subluxation. Mild right hip DJD noted.    ASSESSMENT:    1. Right hip pain, acute  2.  Concern for right hip femoral neck stress fracture    PLAN:  1.  MRI right hip  To evaluate for stress fracture. CT missed her hip stress fracture previously  2.  She is currently on Prolia for her osteopenia  3.  Continue current pain management  4. Will call her with results to determine treatment plan.     All questions were answered, pt will contact us for questions or concerns in the interim.

## 2019-09-11 ENCOUNTER — APPOINTMENT (OUTPATIENT)
Dept: RADIOLOGY | Facility: OTHER | Age: 59
End: 2019-09-11
Attending: PODIATRIST
Payer: MEDICARE

## 2019-09-11 ENCOUNTER — OFFICE VISIT (OUTPATIENT)
Dept: PODIATRY | Facility: CLINIC | Age: 59
End: 2019-09-11
Payer: MEDICARE

## 2019-09-11 ENCOUNTER — TELEPHONE (OUTPATIENT)
Dept: UROLOGY | Facility: CLINIC | Age: 59
End: 2019-09-11

## 2019-09-11 ENCOUNTER — TELEPHONE (OUTPATIENT)
Dept: PHARMACY | Facility: CLINIC | Age: 59
End: 2019-09-11

## 2019-09-11 VITALS — BODY MASS INDEX: 29.83 KG/M2 | HEIGHT: 61 IN | WEIGHT: 158 LBS

## 2019-09-11 DIAGNOSIS — M79.671 RIGHT FOOT PAIN: ICD-10-CM

## 2019-09-11 DIAGNOSIS — Z87.312 HISTORY OF STRESS FRACTURE: Primary | ICD-10-CM

## 2019-09-11 DIAGNOSIS — Z87.312 HISTORY OF STRESS FRACTURE: ICD-10-CM

## 2019-09-11 PROCEDURE — 99213 OFFICE O/P EST LOW 20 MIN: CPT | Mod: S$GLB,,, | Performed by: PODIATRIST

## 2019-09-11 PROCEDURE — 99999 PR PBB SHADOW E&M-EST. PATIENT-LVL IV: CPT | Mod: PBBFAC,,, | Performed by: PODIATRIST

## 2019-09-11 PROCEDURE — 99999 PR PBB SHADOW E&M-EST. PATIENT-LVL IV: ICD-10-PCS | Mod: PBBFAC,,, | Performed by: PODIATRIST

## 2019-09-11 PROCEDURE — 73630 X-RAY EXAM OF FOOT: CPT | Mod: 26,RT,, | Performed by: RADIOLOGY

## 2019-09-11 PROCEDURE — 3008F PR BODY MASS INDEX (BMI) DOCUMENTED: ICD-10-PCS | Mod: CPTII,S$GLB,, | Performed by: PODIATRIST

## 2019-09-11 PROCEDURE — 99213 PR OFFICE/OUTPT VISIT, EST, LEVL III, 20-29 MIN: ICD-10-PCS | Mod: S$GLB,,, | Performed by: PODIATRIST

## 2019-09-11 PROCEDURE — 73630 X-RAY EXAM OF FOOT: CPT | Mod: TC,RT

## 2019-09-11 PROCEDURE — 3008F BODY MASS INDEX DOCD: CPT | Mod: CPTII,S$GLB,, | Performed by: PODIATRIST

## 2019-09-11 PROCEDURE — 73630 XR FOOT COMPLETE 3 VIEW RIGHT: ICD-10-PCS | Mod: 26,RT,, | Performed by: RADIOLOGY

## 2019-09-11 NOTE — TELEPHONE ENCOUNTER
----- Message from Susan Bagley MA sent at 9/11/2019  7:25 AM CDT -----  Contact: pt      ----- Message -----  From: Don Michael  Sent: 9/10/2019   5:14 PM  To: Susan Balgey MA        ----- Message -----  From: Erin Bates  Sent: 9/10/2019   4:20 PM  To: Milo Campbell Staff    Pt called stated she would like to reschedule her procedure on 9/13/19 to 9/19 or 9/20. Pt call back # [phone]

## 2019-09-11 NOTE — PROGRESS NOTES
Chief Complaint   Patient presents with    Follow-up     Fx          HPI:       Joyce Peterson is a 58 y.o. female who presents to clinic for right foot swelling and tenderness at the forefoot noted about a week ago.  She does not recall acute trauma to the area.   She has history of Rheumatoid Arthritis, but not taking meds not due to being on antibiotics.   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   She is on Prednisone.      9/11/19:  follow up right foot.  She has been in [employer] shoe.  Swelling and pain improving.   Xrays today.          Patient Active Problem List   Diagnosis    Osteoarthritis    GERD (gastroesophageal reflux disease)    Gastroparesis    Osteopenia    Coronary artery disease    Lumbar spondylosis    CS (cervical spondylosis)    Thyroid nodule    Hyperlipidemia    Uncomplicated asthma    AR (allergic rhinitis)    Immunosuppressed status    Rheumatoid arthritis involving multiple sites    Carpal tunnel syndrome of right wrist    Elevated parathyroid hormone    Hypogammaglobulinemia    Right carpal tunnel syndrome    Pain in right hand    Pain in right elbow    Decreased  strength of right hand    Right shoulder pain    Iron deficiency anemia due to chronic blood loss    Pure hypercholesterolemia    Research study patient    Sjogren's syndrome    Esophageal candidiasis    Rupture of left triceps tendon    Left hip pain    Gait abnormality    Trochanteric bursitis of left hip    Stress fracture of neck of left femur    Stress fracture of neck of femur with delayed healing    Left leg weakness    Impaired functional mobility, balance, gait, and endurance    Coronary artery disease involving native coronary artery of native heart without angina pectoris               Current Outpatient Medications on File Prior to Visit   Medication Sig Dispense Refill    albuterol (ACCUNEB) 1.25 mg/3 mL Nebu Take 3 mLs (1.25 mg  total) by nebulization every 6 (six) hours as needed. Rescue 1 Box 2    albuterol (PROVENTIL HFA) 90 mcg/actuation inhaler Inhale 2 puffs into the lungs every 6 (six) hours as needed. Rescue 20.1 Inhaler 0    ampicillin (PRINCIPEN) 500 MG capsule TAKE 1 CAPUSLE BY MOUTH EVERY 6 HOURS  2    aspirin 81 mg Tab Take 81 mg by mouth every morning.       azelastine (ASTELIN) 137 mcg nasal spray 1 spray (137 mcg total) by Nasal route 2 (two) times daily. (Patient taking differently: 1 spray by Nasal route 2 (two) times daily as needed. ) 30 mL 11    budesonide-formoterol 160-4.5 mcg (SYMBICORT) 160-4.5 mcg/actuation HFAA Inhale 2 puffs into the lungs every 12 (twelve) hours. 3 Inhaler 3    calcium citrate-vitamin D (CITRACAL + D) 315-200 mg-unit per tablet Take 1 tablet by mouth 2 (two) times daily.       diclofenac sodium (VOLTAREN) 1 % Gel Apply 2 g topically 4 (four) times daily as needed. 500 g 5    fluconazole (DIFLUCAN) 100 MG tablet Take 100 mg by mouth once daily.  3    fluticasone propionate (FLONASE) 50 mcg/actuation nasal spray 2 sprays (100 mcg total) by Each Nare route once daily. 1 Bottle 3    GUAIFENESIN (MUCINEX ORAL) Take 400 mg by mouth every 4 (four) hours as needed.       INV PROPULSID 10 MG Take 1 tablets (20 mg) by mouth 4 (four) times daily. FOR INVESTIGATIONAL USE ONLY. Protocol CIS-USA-154 1320 each 0    leflunomide (ARAVA) 20 MG Tab Take 1 tablet (20 mg total) by mouth once daily. 30 tablet 2    montelukast (SINGULAIR) 10 mg tablet Take 1 tablet (10 mg total) by mouth nightly. 90 tablet 3    naproxen (NAPROSYN) 500 MG tablet TAKE 1 TABLET TWICE A DAY AS NEEDED 180 tablet 0    omeprazole (PRILOSEC) 40 MG capsule Take 1 capsule (40 mg total) by mouth every morning. 30 capsule 6    omeprazole-sodium bicarbonate (ZEGERID) 40-1.1 mg-gram per capsule TAKE 1 CAPSULE BY MOUTH EVERY MORNING. 90 capsule 3    POTASSIUM ORAL Take by mouth once daily.      predniSONE (DELTASONE) 1 MG tablet  "Take 1 tablet (1 mg total) by mouth once daily. 90 tablet 1    predniSONE (DELTASONE) 5 MG tablet TAKE 1 TABLET BY MOUTH EVERY DAY 90 tablet 1    PREMARIN 0.625 mg tablet Take 0.625 mg by mouth once daily.  4    PREMARIN vaginal cream PLACE 0.5 G VAGINALLY 3 (THREE) TIMES A WEEK. 30 g 3    progesterone (PROMETRIUM) 100 MG capsule Take 100 mg by mouth every morning. 1 Capsule Oral Every day, morning      RESTASIS 0.05 % ophthalmic emulsion PLACE 0.4 MLS (1 DROP TOTAL) INTO BOTH EYES 2 (TWO) TIMES DAILY.  5    rizatriptan (MAXALT) 10 MG tablet Take 10 mg by mouth as needed for Migraine.       rosuvastatin (CRESTOR) 20 MG tablet Take 1 tablet (20 mg total) by mouth every evening. 90 tablet 3    sarilumab (KEVZARA) 200 mg/1.14 mL Syrg Inject 1.14 mLs (200 mg total) into the skin every 14 (fourteen) days. 2.28 mL 2    sucralfate (CARAFATE) 1 gram tablet TAKE 1 TABLET (1 G TOTAL) BY MOUTH 4 (FOUR) TIMES DAILY. 360 tablet 3    syringe with needle (TUBERCULIN SYRINGE) 1 mL 27 x 1/2" Syrg To administer methotrexate 7.5mg sc once a week 12 Syringe 3    valACYclovir (VALTREX) 1000 MG tablet TAKE 1 TABLET BY MOUTH TWICE A DAY AS NEEDED 20 tablet 3    diazePAM (VALIUM) 10 MG Tab Take 1 tablet (10 mg total) by mouth daily as needed (30 minutes prior to procedure to help anxiety). 1 tablet 0     Current Facility-Administered Medications on File Prior to Visit   Medication Dose Route Frequency Provider Last Rate Last Dose    0.9%  NaCl infusion   Intravenous Continuous Callum Singer MD   Stopped at 05/21/19 1100           Review of patient's allergies indicates:   Allergen Reactions    Actemra [tocilizumab] Other (See Comments)     Severe dizziness    Codeine Nausea And Vomiting    Gold au 198 Hives and Rash    Hydroxychloroquine Other (See Comments)     Can't remember the reaction      Iodinated contrast media - oral and iv dye Other (See Comments)     Other reaction(s): BURNING ALL OVER    Iodine Other " "(See Comments)     Other reaction(s): BURNING ALL OVER - Iodine dye - Not topical    Sulfa (sulfonamide antibiotics) Other (See Comments)     Can't remember the reaction    Zofran [ondansetron hcl (pf)] Nausea And Vomiting     Pt reports that last time she received zofran she started vomiting again    Methotrexate analogues Nausea Only    Pneumovax 23 [pneumococcal 23-argenis ps vaccine] Other (See Comments)     "sick"           Social History     Socioeconomic History    Marital status:      Spouse name: Not on file    Number of children: Not on file    Years of education: Not on file    Highest education level: Not on file   Occupational History    Not on file   Social Needs    Financial resource strain: Not on file    Food insecurity:     Worry: Not on file     Inability: Not on file    Transportation needs:     Medical: Not on file     Non-medical: Not on file   Tobacco Use    Smoking status: Never Smoker    Smokeless tobacco: Never Used   Substance and Sexual Activity    Alcohol use: Yes     Comment: occasionally    Drug use: No    Sexual activity: Yes     Partners: Male     Birth control/protection: Surgical   Lifestyle    Physical activity:     Days per week: Not on file     Minutes per session: Not on file    Stress: Not on file   Relationships    Social connections:     Talks on phone: Not on file     Gets together: Not on file     Attends Oriental orthodox service: Not on file     Active member of club or organization: Not on file     Attends meetings of clubs or organizations: Not on file     Relationship status: Not on file   Other Topics Concern    Are you pregnant or think you may be? Not Asked    Breast-feeding Not Asked   Social History Narrative    Not on file           ROS:  General ROS: negative for  chills, fatigue or fever  Cardiovascular ROS: no chest pain or dyspnea on exertion  Musculoskeletal ROS: negative for joint pain or joint stiffness.  Negative for loss of strength.  " "Positive for foot pain.   Neuro ROS: Negative for syncope, numbness, or muscle weakness  Skin ROS: Negative for rash, itching or nail/hair changes.           OBJECTIVE:         Vitals:    09/11/19 1115   Weight: 71.7 kg (158 lb)   Height: 5' 1" (1.549 m)        Bilateral  Lower extremity exam:    Vasc: Palpable pedal pulses. Feet appropriately warm to touch. Cap refill time is within normal limits.  There is edema to the right forefoot.      Neurological:  Light touch, proprioception, and Sharp/dull sensation are all intact. There is no Tinel's along the tarsal tunnel.    Negative genesis's.  No sensorimotor deficitis.     Derm: No open lesions, macerations, or rashes.  No bruising.  No open wounds.   Redness over the bilateral bunions and tailor's bunions      MSK:  Mild  tenderness to palpation near the 4th or 5th metatarsal shaft;   bilateral bunions and tailors bunion with redness 2/2 shoe irritation, worse on the right.    Limited 1st metatarso-phalangeal joint range of motion.   bilateral adductovarus 5th toes.       8/21/19  FINDINGS:  Healing fractures of the 3rd, 4th and 5th metatarsal bones identified.  The position alignment is unchanged as compared to the previous study.  DJD, hallux valgus and bunion formation.  Small calcification noted the adjacent to the 1st metatarsophalangeal joint medially.  Pes planus.      9/11/19  Impression       No significant change in appearance of the 3rd and 4th metatarsal fractures with callus formations and persistent lucent fracture line.    Unchanged appearance of the 5th metatarsal fracture which appears well healed.             ASSESSMENT/PLAN:        History of stress fracture  -     X-Ray Foot Complete Right; Future; Expected date: 09/11/2019    Right foot pain  -     X-Ray Foot Complete Right; Future; Expected date: 09/11/2019         · Xrays reviewed.   · Never walk barefoot.  Transition to athletic shoes.    · Monitor for worsening signs and symptoms.  · follow " up in 3-4 months or sooner if concerned.

## 2019-09-13 ENCOUNTER — PATIENT MESSAGE (OUTPATIENT)
Dept: SPORTS MEDICINE | Facility: CLINIC | Age: 59
End: 2019-09-13

## 2019-09-13 ENCOUNTER — HOSPITAL ENCOUNTER (OUTPATIENT)
Dept: RADIOLOGY | Facility: HOSPITAL | Age: 59
Discharge: HOME | End: 2019-09-13
Attending: PHYSICIAN ASSISTANT
Payer: MEDICARE

## 2019-09-13 DIAGNOSIS — M25.551 RIGHT HIP PAIN: ICD-10-CM

## 2019-09-13 DIAGNOSIS — Z87.312 HISTORY OF STRESS FRACTURE: ICD-10-CM

## 2019-09-13 PROCEDURE — 73721 MRI HIP WITHOUT CONTRAST RIGHT: ICD-10-PCS | Mod: 26,RT,, | Performed by: RADIOLOGY

## 2019-09-13 PROCEDURE — 73721 MRI JNT OF LWR EXTRE W/O DYE: CPT | Mod: 26,RT,, | Performed by: RADIOLOGY

## 2019-09-13 PROCEDURE — 73721 MRI JNT OF LWR EXTRE W/O DYE: CPT | Mod: TC,RT

## 2019-09-16 ENCOUNTER — TELEPHONE (OUTPATIENT)
Dept: SPORTS MEDICINE | Facility: CLINIC | Age: 59
End: 2019-09-16

## 2019-09-16 DIAGNOSIS — M70.62 TROCHANTERIC BURSITIS OF LEFT HIP: Primary | ICD-10-CM

## 2019-09-16 DIAGNOSIS — M25.551 RIGHT HIP PAIN: ICD-10-CM

## 2019-09-16 NOTE — TELEPHONE ENCOUNTER
RX call attempt 2 regarding Jonnyvicente from [facility]. Patient reached-- stated she currently has 1 injection on hand for Thursday 9/19 and has not missed any doses on the previous refill. Her next needed injection is scheduled for Thursday, 10/3. I let her know that I would be giving her a call back next week to schedule her refill..    Patient also advised me that [place] will be going [place].     Pending out refill date accordingly..

## 2019-09-16 NOTE — TELEPHONE ENCOUNTER
Called patient and informed her of the right hip MRi results which did not show a stress fracture. MRI did show hip OA and glute tendinitis. Her pain is mostly from glute tendinitis and trochanteric bursitis. Recommended her to do PT to improve this. She reports needing PT for her bilateral hips s/p left hip surgery. Will order this for [facility]. Also let her know about enlarged ovary noted on exam. She will discuss this with her gyn doctor that is already treating her for this.     F/u in clinic as needed.

## 2019-09-20 ENCOUNTER — PATIENT MESSAGE (OUTPATIENT)
Dept: PODIATRY | Facility: CLINIC | Age: 59
End: 2019-09-20

## 2019-09-20 ENCOUNTER — OFFICE VISIT (OUTPATIENT)
Dept: PODIATRY | Facility: CLINIC | Age: 59
End: 2019-09-20
Payer: MEDICARE

## 2019-09-20 ENCOUNTER — TELEPHONE (OUTPATIENT)
Dept: PODIATRY | Facility: CLINIC | Age: 59
End: 2019-09-20

## 2019-09-20 ENCOUNTER — PATIENT OUTREACH (OUTPATIENT)
Dept: ADMINISTRATIVE | Facility: OTHER | Age: 59
End: 2019-09-20

## 2019-09-20 ENCOUNTER — HOSPITAL ENCOUNTER (OUTPATIENT)
Dept: RADIOLOGY | Facility: HOSPITAL | Age: 59
Discharge: HOME | End: 2019-09-20
Attending: PODIATRIST
Payer: MEDICARE

## 2019-09-20 VITALS — BODY MASS INDEX: 29.83 KG/M2 | HEIGHT: 61 IN | WEIGHT: 158 LBS

## 2019-09-20 DIAGNOSIS — M84.474S METATARSAL FRACTURE, PATHOLOGIC, RIGHT, SEQUELA: ICD-10-CM

## 2019-09-20 DIAGNOSIS — M79.671 RIGHT FOOT PAIN: ICD-10-CM

## 2019-09-20 DIAGNOSIS — M79.671 RIGHT FOOT PAIN: Primary | ICD-10-CM

## 2019-09-20 PROCEDURE — 99999 PR PBB SHADOW E&M-EST. PATIENT-LVL IV: CPT | Mod: PBBFAC,,, | Performed by: PODIATRIST

## 2019-09-20 PROCEDURE — 73718 MRI FOOT (FOREFOOT) RIGHT WITHOUT CONTRAST: ICD-10-PCS | Mod: 26,RT,, | Performed by: RADIOLOGY

## 2019-09-20 PROCEDURE — 3008F PR BODY MASS INDEX (BMI) DOCUMENTED: ICD-10-PCS | Mod: CPTII,S$GLB,, | Performed by: PODIATRIST

## 2019-09-20 PROCEDURE — 3008F BODY MASS INDEX DOCD: CPT | Mod: CPTII,S$GLB,, | Performed by: PODIATRIST

## 2019-09-20 PROCEDURE — 99999 PR PBB SHADOW E&M-EST. PATIENT-LVL IV: ICD-10-PCS | Mod: PBBFAC,,, | Performed by: PODIATRIST

## 2019-09-20 PROCEDURE — 73718 MRI LOWER EXTREMITY W/O DYE: CPT | Mod: 26,RT,, | Performed by: RADIOLOGY

## 2019-09-20 PROCEDURE — 99213 PR OFFICE/OUTPT VISIT, EST, LEVL III, 20-29 MIN: ICD-10-PCS | Mod: S$GLB,,, | Performed by: PODIATRIST

## 2019-09-20 PROCEDURE — 99213 OFFICE O/P EST LOW 20 MIN: CPT | Mod: S$GLB,,, | Performed by: PODIATRIST

## 2019-09-20 PROCEDURE — 73718 MRI LOWER EXTREMITY W/O DYE: CPT | Mod: TC,RT

## 2019-09-20 NOTE — TELEPHONE ENCOUNTER
Spoke with patient she state she need to be seen today schedule appointment with Dr Glass for today at 10:15am.        ----- Message from Krystal Morris sent at 9/20/2019  8:04 AM CDT -----  Contact: PT  PT is calling requesting an appointment to be seen.   Reason: Right foot swelling f/u -- hurting quite a lot    Callback: [phone]

## 2019-09-20 NOTE — PROGRESS NOTES
Subjective:      Patient ID: Joyce Peterson is a 58 y.o. female.    Chief Complaint: Foot Pain (Right Foot)    Pt presents today c/o a painful right foot. Pt has hx of RA with painful, pathologic metatarsal fractures of the right foot. Pt presents today stating she is sure another one is broken in her foot due to her pain levels.     ROS        Objective:      Physical Exam          Assessment:       Encounter Diagnoses   Name Primary?    Right foot pain Yes    Metatarsal fracture, pathologic, right, sequela          Plan:       Joyce was seen today for foot pain.    Diagnoses and all orders for this visit:    Right foot pain  -     MRI Foot (Forefoot) Right W W/O Contrast; Future    Metatarsal fracture, pathologic, right, sequela  -     MRI Foot (Forefoot) Right W W/O Contrast; Future      I counseled the patient on her conditions, their implications and medical management.      X-rays taken and reviewed healing but not healed fx noted to mets 3,4 right  Pt dispensed CAM boot to wear, with heel lifts to be worn in athletic shoes on left.   MRI ordered, will f/u with pt after testing  Bone stimulator recommended.   .

## 2019-09-21 ENCOUNTER — PATIENT MESSAGE (OUTPATIENT)
Dept: PODIATRY | Facility: CLINIC | Age: 59
End: 2019-09-21

## 2019-09-22 RX ORDER — PREDNISONE 1 MG/1
1 TABLET ORAL DAILY
Qty: 30 TABLET | Refills: 1 | Status: SHIPPED | OUTPATIENT
Start: 2019-09-22 | End: 2019-09-24

## 2019-09-23 ENCOUNTER — TELEPHONE (OUTPATIENT)
Dept: PODIATRY | Facility: CLINIC | Age: 59
End: 2019-09-23

## 2019-09-23 NOTE — TELEPHONE ENCOUNTER
----- Message from lEena Orourke sent at 9/23/2019  2:45 PM CDT -----  Contact: Patient   Patient called for the reading of mri , Requesting an call back           Patient can reached at: [phone]

## 2019-09-23 NOTE — TELEPHONE ENCOUNTER
----- Message from Raudel Carballo sent at 9/23/2019 10:50 AM CDT -----  Contact: self  Dr Glass    Pt ask for a call w/her MRI results along w/her treatment plan Pt also have questions concerning the bone stimulator that Dr Glass want her to have       Contact info

## 2019-09-24 ENCOUNTER — OFFICE VISIT (OUTPATIENT)
Dept: RHEUMATOLOGY | Facility: CLINIC | Age: 59
End: 2019-09-24
Payer: MEDICARE

## 2019-09-24 ENCOUNTER — OFFICE VISIT (OUTPATIENT)
Dept: HEMATOLOGY/ONCOLOGY | Facility: CLINIC | Age: 59
End: 2019-09-24
Payer: MEDICARE

## 2019-09-24 ENCOUNTER — TELEPHONE (OUTPATIENT)
Dept: PHARMACY | Facility: CLINIC | Age: 59
End: 2019-09-24

## 2019-09-24 ENCOUNTER — LAB VISIT (OUTPATIENT)
Dept: LAB | Facility: HOSPITAL | Age: 59
End: 2019-09-24
Attending: INTERNAL MEDICINE
Payer: MEDICARE

## 2019-09-24 VITALS
WEIGHT: 161.38 LBS | HEIGHT: 61 IN | OXYGEN SATURATION: 98 % | SYSTOLIC BLOOD PRESSURE: 135 MMHG | DIASTOLIC BLOOD PRESSURE: 81 MMHG | TEMPERATURE: 98 F | HEART RATE: 97 BPM | BODY MASS INDEX: 30.47 KG/M2 | RESPIRATION RATE: 16 BRPM

## 2019-09-24 VITALS
BODY MASS INDEX: 27.85 KG/M2 | HEART RATE: 99 BPM | DIASTOLIC BLOOD PRESSURE: 84 MMHG | SYSTOLIC BLOOD PRESSURE: 125 MMHG | WEIGHT: 163.13 LBS | HEIGHT: 64 IN

## 2019-09-24 DIAGNOSIS — M85.80 OSTEOPENIA, UNSPECIFIED LOCATION: ICD-10-CM

## 2019-09-24 DIAGNOSIS — M05.79 RHEUMATOID ARTHRITIS INVOLVING MULTIPLE SITES WITH POSITIVE RHEUMATOID FACTOR: Primary | ICD-10-CM

## 2019-09-24 DIAGNOSIS — D50.0 IRON DEFICIENCY ANEMIA DUE TO CHRONIC BLOOD LOSS: Primary | ICD-10-CM

## 2019-09-24 DIAGNOSIS — M06.9 RHEUMATOID ARTHRITIS, INVOLVING UNSPECIFIED SITE, UNSPECIFIED RHEUMATOID FACTOR PRESENCE: ICD-10-CM

## 2019-09-24 DIAGNOSIS — M05.79 RHEUMATOID ARTHRITIS INVOLVING MULTIPLE SITES WITH POSITIVE RHEUMATOID FACTOR: ICD-10-CM

## 2019-09-24 DIAGNOSIS — Z79.899 ENCOUNTER FOR MONITORING LEFLUNOMIDE THERAPY: ICD-10-CM

## 2019-09-24 DIAGNOSIS — Z51.81 ENCOUNTER FOR MONITORING LEFLUNOMIDE THERAPY: ICD-10-CM

## 2019-09-24 LAB
ALBUMIN SERPL BCP-MCNC: 3.9 G/DL (ref 3.5–5.2)
ALP SERPL-CCNC: 78 U/L (ref 55–135)
ALT SERPL W/O P-5'-P-CCNC: 20 U/L (ref 10–44)
ANION GAP SERPL CALC-SCNC: 11 MMOL/L (ref 8–16)
AST SERPL-CCNC: 22 U/L (ref 10–40)
BASOPHILS # BLD AUTO: 0.07 K/UL (ref 0–0.2)
BASOPHILS NFR BLD: 1.1 % (ref 0–1.9)
BILIRUB SERPL-MCNC: 0.3 MG/DL (ref 0.1–1)
BUN SERPL-MCNC: 18 MG/DL (ref 6–20)
CALCIUM SERPL-MCNC: 8.8 MG/DL (ref 8.7–10.5)
CHLORIDE SERPL-SCNC: 108 MMOL/L (ref 95–110)
CO2 SERPL-SCNC: 23 MMOL/L (ref 23–29)
CREAT SERPL-MCNC: 0.8 MG/DL (ref 0.5–1.4)
CRP SERPL-MCNC: <0.1 MG/L (ref 0–8.2)
DIFFERENTIAL METHOD: ABNORMAL
EOSINOPHIL # BLD AUTO: 0.1 K/UL (ref 0–0.5)
EOSINOPHIL NFR BLD: 0.8 % (ref 0–8)
ERYTHROCYTE [DISTWIDTH] IN BLOOD BY AUTOMATED COUNT: 14.4 % (ref 11.5–14.5)
ERYTHROCYTE [SEDIMENTATION RATE] IN BLOOD BY WESTERGREN METHOD: <2 MM/HR (ref 0–36)
EST. GFR  (AFRICAN AMERICAN): >60 ML/MIN/1.73 M^2
EST. GFR  (NON AFRICAN AMERICAN): >60 ML/MIN/1.73 M^2
FERRITIN SERPL-MCNC: 17 NG/ML (ref 20–300)
GLUCOSE SERPL-MCNC: 99 MG/DL (ref 70–110)
HCT VFR BLD AUTO: 40.5 % (ref 37–48.5)
HGB BLD-MCNC: 12.5 G/DL (ref 12–16)
IGA SERPL-MCNC: 109 MG/DL (ref 40–350)
IGG SERPL-MCNC: 655 MG/DL (ref 650–1600)
IGM SERPL-MCNC: 27 MG/DL (ref 50–300)
IMM GRANULOCYTES # BLD AUTO: 0.02 K/UL (ref 0–0.04)
IMM GRANULOCYTES NFR BLD AUTO: 0.3 % (ref 0–0.5)
LYMPHOCYTES # BLD AUTO: 2 K/UL (ref 1–4.8)
LYMPHOCYTES NFR BLD: 31.6 % (ref 18–48)
MCH RBC QN AUTO: 27.8 PG (ref 27–31)
MCHC RBC AUTO-ENTMCNC: 30.9 G/DL (ref 32–36)
MCV RBC AUTO: 90 FL (ref 82–98)
MONOCYTES # BLD AUTO: 1.1 K/UL (ref 0.3–1)
MONOCYTES NFR BLD: 18.2 % (ref 4–15)
NEUTROPHILS # BLD AUTO: 3 K/UL (ref 1.8–7.7)
NEUTROPHILS NFR BLD: 48 % (ref 38–73)
NRBC BLD-RTO: 0 /100 WBC
PLATELET # BLD AUTO: 247 K/UL (ref 150–350)
PMV BLD AUTO: 10.9 FL (ref 9.2–12.9)
POTASSIUM SERPL-SCNC: 3.4 MMOL/L (ref 3.5–5.1)
PROT SERPL-MCNC: 6.3 G/DL (ref 6–8.4)
RBC # BLD AUTO: 4.49 M/UL (ref 4–5.4)
SODIUM SERPL-SCNC: 142 MMOL/L (ref 136–145)
WBC # BLD AUTO: 6.27 K/UL (ref 3.9–12.7)

## 2019-09-24 PROCEDURE — 99214 PR OFFICE/OUTPT VISIT, EST, LEVL IV, 30-39 MIN: ICD-10-PCS | Mod: S$GLB,,, | Performed by: INTERNAL MEDICINE

## 2019-09-24 PROCEDURE — 85025 COMPLETE CBC W/AUTO DIFF WBC: CPT

## 2019-09-24 PROCEDURE — 99214 OFFICE O/P EST MOD 30 MIN: CPT | Mod: S$GLB,,, | Performed by: INTERNAL MEDICINE

## 2019-09-24 PROCEDURE — 82784 ASSAY IGA/IGD/IGG/IGM EACH: CPT | Mod: 59

## 2019-09-24 PROCEDURE — 3008F PR BODY MASS INDEX (BMI) DOCUMENTED: ICD-10-PCS | Mod: CPTII,S$GLB,, | Performed by: INTERNAL MEDICINE

## 2019-09-24 PROCEDURE — 86140 C-REACTIVE PROTEIN: CPT

## 2019-09-24 PROCEDURE — 82728 ASSAY OF FERRITIN: CPT

## 2019-09-24 PROCEDURE — 36415 COLL VENOUS BLD VENIPUNCTURE: CPT

## 2019-09-24 PROCEDURE — 3008F BODY MASS INDEX DOCD: CPT | Mod: CPTII,S$GLB,, | Performed by: INTERNAL MEDICINE

## 2019-09-24 PROCEDURE — 99999 PR PBB SHADOW E&M-EST. PATIENT-LVL III: ICD-10-PCS | Mod: PBBFAC,,, | Performed by: INTERNAL MEDICINE

## 2019-09-24 PROCEDURE — 99999 PR PBB SHADOW E&M-EST. PATIENT-LVL III: CPT | Mod: PBBFAC,,, | Performed by: INTERNAL MEDICINE

## 2019-09-24 PROCEDURE — 85652 RBC SED RATE AUTOMATED: CPT

## 2019-09-24 PROCEDURE — 99999 PR PBB SHADOW E&M-EST. PATIENT-LVL V: ICD-10-PCS | Mod: PBBFAC,,, | Performed by: INTERNAL MEDICINE

## 2019-09-24 PROCEDURE — 82787 IGG 1 2 3 OR 4 EACH: CPT

## 2019-09-24 PROCEDURE — 99999 PR PBB SHADOW E&M-EST. PATIENT-LVL V: CPT | Mod: PBBFAC,,, | Performed by: INTERNAL MEDICINE

## 2019-09-24 PROCEDURE — 80053 COMPREHEN METABOLIC PANEL: CPT

## 2019-09-24 RX ORDER — PREDNISONE 5 MG/1
10 TABLET ORAL DAILY
Qty: 180 TABLET | Refills: 1 | Status: SHIPPED | OUTPATIENT
Start: 2019-09-24 | End: 2019-11-29

## 2019-09-24 ASSESSMENT — ROUTINE ASSESSMENT OF PATIENT INDEX DATA (RAPID3)
AM STIFFNESS SCORE: 1, YES
PAIN SCORE: 6
PATIENT GLOBAL ASSESSMENT SCORE: 7
TOTAL RAPID3 SCORE: 5
WHEN YOU AWAKENED IN THE MORNING OVER THE LAST WEEK, PLEASE INDICATE THE AMOUNT OF TIME IT TAKES UNTIL YOU ARE AS LIMBER AS YOU WILL BE FOR THE DAY: 24HRS
PSYCHOLOGICAL DISTRESS SCORE: 0
FATIGUE SCORE: 4
MDHAQ FUNCTION SCORE: .6

## 2019-09-24 NOTE — PROGRESS NOTES
"Subjective:       Patient ID: Joyce Peterson is a 58 y.o. female.    Chief Complaint: RA, Fibro, osteo    Overall, she is doing poorly. She has had recurrent UTI and sinus infections, causing her to miss doses of sarilumab while on antibiotics. She currently has a cough, previously was producing green sputum but has improved. She denies recent fevers, rashes, ulcers, hair changes, or chest pain. She continues to have her regular fibromyalgia aches and fatigue.    She had another metatarsal fracture recently without major trauma. She has intermittent right knee swelling if she walks too much.    She has a trip planned for early October for a cruise from [place] into [place].        Review of Systems   Constitutional: Positive for fatigue. Negative for fever.   HENT: Positive for sinus pressure. Negative for ear pain and mouth sores.    Eyes: Negative for pain and redness.   Respiratory: Positive for cough. Negative for shortness of breath.    Cardiovascular: Negative for chest pain and palpitations.   Gastrointestinal: Negative for abdominal pain and constipation.   Genitourinary: Negative for difficulty urinating and frequency.   Musculoskeletal: Positive for arthralgias and joint swelling.   Skin: Negative for rash and wound.   Neurological: Negative for numbness and headaches.   All other systems reviewed and are negative.        Objective:   /84   Pulse 99   Ht 5' 3.6" (1.615 m)   Wt 74 kg (163 lb 1.6 oz)   LMP  (LMP Unknown)   BMI 28.35 kg/m²      Physical Exam   Vitals reviewed.  Constitutional: She is oriented to person, place, and time and well-developed, well-nourished, and in no distress.   HENT:   Head: Normocephalic and atraumatic.   Eyes: Conjunctivae and EOM are normal. Pupils are equal, round, and reactive to light.   Neck: Normal range of motion. Neck supple.   Cardiovascular: Normal rate, regular rhythm and normal heart sounds.    Pulmonary/Chest: Effort normal and breath sounds " normal.   Abdominal: Soft. Bowel sounds are normal.       Right Side Rheumatological Exam     Examination finds the elbow, knee, 1st PIP, 4th PIP, 4th MCP, 5th PIP and 5th MCP normal.    The patient is tender to palpation of the shoulder, wrist, 1st MCP, 2nd MCP and 3rd MCP    The patient has an enlarged wrist, 2nd PIP, 2nd MCP, 3rd PIP and 3rd MCP    Left Side Rheumatological Exam     Examination finds the elbow, knee, 1st PIP, 4th PIP, 4th MCP, 5th PIP and 5th MCP normal.    The patient is tender to palpation of the shoulder, wrist and 1st MCP.    The patient has an enlarged wrist, 2nd PIP, 2nd MCP, 3rd PIP and 3rd MCP.      Neurological: She is alert and oriented to person, place, and time.   Skin: Skin is warm and dry.     Musculoskeletal: Normal range of motion.         Ulnar deviation of bilateral digits observed    Labs today     Assessment:       1. Rheumatoid arthritis involving multiple sites with positive rheumatoid factor    2. Osteopenia, unspecified location        TJ 8, SJ 0, GH 70, ESR, CRP    Plan:       Problem List Items Addressed This Visit        Orthopedic    Osteopenia    Relevant Orders    Ambulatory referral/consult to Endocrinology    Rheumatoid arthritis involving multiple sites - Primary    Relevant Orders    Influenza - Quadrivalent ([facility]CK)    IgG 1, 2, 3, and 4    IMMUNOGLOBULINS (IGG, IGA, IGM) QUANTITATIVE        RA  - Prednisone 10mg daily during trip. Decrease by 1mg every 2 weeks until 5mg daily  - Cont sarilumab 200mg every other week  - Cont leflunomide 20mg daily  - Speak with Dr. Pedraza about antibiotic prophylactic regimen for recurrent UTIs  - Influenza vaccine  - RTC 3 months with standing labs    Osteopenia  - Cont Premarin 0.625mg  - denosumab 60mg sc q 6 months  for osteopenia with elevated FRAX    Recurrent metatarsal fractures  - Endocrinology consult with Dr. Lane

## 2019-09-24 NOTE — PATIENT INSTRUCTIONS
Please get your lab work drawn today. Please get your flu vaccine by the end of October.    Prednisone 10mg daily during trip. Decrease by 1mg every 2 weeks until you continue taking 5mg daily.

## 2019-09-24 NOTE — PROGRESS NOTES
I have personally taken the history and examined the patient and agree with the resident,s note as stated above       RA  TJ  SJ  PT global 70 ESR  CRP  Osteopenia with FRAX hip 2.6%  Major 25%      High dose flu vaccine  Standing labs today  Quant Igs and IgG subclasses repeat  Speak with Dr. Pedraza about antibiotic prophylactic regimen for recurrent UTIs  Resume sarilumab 200mg every 2 wks after finish current antibiotics  Cont leflunomide 20mg daily  Prednisone 10mg during trip, then taper by 1mg every 2 wks on return  Cont Premarin 0.625mg  denosumab 60mg sc q 6 months  for osteopenia with elevated FRAX  ?  Cause of recurrent metatarsal fractures  Endocrinology consult with Dr. Lane about this  RTC 3 months with standing labs

## 2019-09-24 NOTE — Clinical Note
-schedule weekly feraheme infusion x 2 doses to start in next 1-2 weeks-cbc, ferritin lab only in 3 months-same labs and NP  appt in 6   months

## 2019-09-24 NOTE — PROGRESS NOTES
SECTION OF HEMATOLOGY AND BONE MARROW TRANSPLANT  Return  Patient Visit   09/26/2019  Referred by:  No ref. provider found  Referred for: ROBERT    CHIEF COMPLAINT:   No chief complaint on file.      HISTORY OF PRESENT ILLNESS:   58 y.o. female Referred to me November 2017  for ROBERT.  Intolerant to po iron.  Iron studies in march 2017 with severe ID.  History RA followed by Dr. Valverde.  History of gastroparesis followed by Dr. Moore in GI.  Denies fever, chills, nightsweats, bleeding, brusing, lymphadenopathy, signs/symptoms of splenomegaly.   Had upper and lower endoscopy summer 2017 unremarkable.  No VCE done.  Denies any overt GI bleeding or other bleeding.    S/p repeat IV feraheme x 2 January 2018 and last may 2019.  Tolerated well.   Maintained ferritin and hgb since that time. Continues close fu with rheum for RA and osteoperosis.        PAST MEDICAL HISTORY:   Past Medical History:   Diagnosis Date    Acid reflux     Allergy     Anemia     Asthma     Coronary artery disease     Degenerative disc disease     Dry eyes     Dry mouth     Gastroparesis     Hyperlipidemia     Lateral meniscus derangement 4/6/2016    Lobular carcinoma in situ     Osteoarthritis     Osteoporosis     Rheumatoid arthritis(714.0)     Umbilical hernia 8/13/2015       PAST SURGICAL HISTORY:   Past Surgical History:   Procedure Laterality Date    BREAST BIOPSY Left 01/29/2002    core bx    CARPAL TUNNEL RELEASE Right 05/2017    CHOLECYSTECTOMY  2004    COLONOSCOPY      10/11    COLONOSCOPY N/A 6/29/2017    Procedure: COLONOSCOPY;  Surgeon: López Moore MD;  Location: [facility] (4TH FLR);  Service: Endoscopy;  Laterality: N/A;    INTRAMEDULLARY RODDING OF FEMUR Left 5/21/2019    Procedure: INSERTION, INTRAMEDULLARY ARIEL, FEMUR;  Surgeon: López Duff MD;  [facility];  Service: Orthopedics;  Laterality: Left;    REPAIR OF TRICEPS TENDON Left 10/17/2018    Procedure: REPAIR, TENDON, TRICEPS left  elbow;  Surgeon: Staci Yarbrough MD;  Location: [facility] OR [address] [place] [address];  Service: Orthopedics;  Laterality: Left;  Anesthesia: General and regional. PRONE, k-wire , hand pan 1 and pan 2, CALL ARTHREX/Tamera notified 10-12     TONSILLECTOMY      TUBAL LIGATION  2003    UPPER GASTROINTESTINAL ENDOSCOPY      10/11    uterine ablation  2003       PAST SOCIAL HISTORY:   reports that she has never smoked. She has never used smokeless tobacco. She reports that she drinks alcohol. She reports that she does not use drugs.    FAMILY HISTORY:  Family History   Problem Relation Age of Onset    Cancer Mother         Lung Cancer    Emphysema Mother     Heart attack Mother     Cancer Father         Lung Cancer    Osteoarthritis Father     Lung cancer Father     Skin cancer Father     Heart disease Brother     Heart attack Brother     Osteoarthritis Paternal Aunt     Retinal detachment Maternal Aunt     No Known Problems Sister     No Known Problems Maternal Uncle     No Known Problems Paternal Uncle     No Known Problems Maternal Grandmother     No Known Problems Maternal Grandfather     No Known Problems Paternal Grandmother     No Known Problems Paternal Grandfather     Colon cancer Neg Hx     Esophageal cancer Neg Hx     Stomach cancer Neg Hx     Celiac disease Neg Hx     Diabetes Neg Hx     Thyroid disease Neg Hx     Amblyopia Neg Hx     Blindness Neg Hx     Cataracts Neg Hx     Glaucoma Neg Hx     Hypertension Neg Hx     Macular degeneration Neg Hx     Strabismus Neg Hx     Stroke Neg Hx        CURRENT MEDICATIONS:   Current Outpatient Medications   Medication Sig    albuterol (PROVENTIL HFA) 90 mcg/actuation inhaler Inhale 2 puffs into the lungs every 6 (six) hours as needed. Rescue    amoxicillin (AMOXIL) 500 MG capsule Take 1 capsule by mouth three times a day.    ampicillin (PRINCIPEN) 500 MG capsule TAKE 1 CAPUSLE BY MOUTH EVERY 6 HOURS    aspirin 81 mg Tab Take 81 mg by  mouth every morning.     azelastine (ASTELIN) 137 mcg nasal spray 1 spray (137 mcg total) by Nasal route 2 (two) times daily. (Patient taking differently: 1 spray by Nasal route 2 (two) times daily as needed. )    benzonatate (TESSALON) 100 MG capsule Take 1 capsule by mouth three times a day    budesonide-formoterol 160-4.5 mcg (SYMBICORT) 160-4.5 mcg/actuation HFAA Inhale 2 puffs into the lungs every 12 (twelve) hours.    calcium citrate-vitamin D (CITRACAL + D) 315-200 mg-unit per tablet Take 1 tablet by mouth 2 (two) times daily.     diclofenac sodium (VOLTAREN) 1 % Gel Apply 2 g topically 4 (four) times daily as needed.    fluconazole (DIFLUCAN) 100 MG tablet Take 100 mg by mouth once daily.    fluconazole (DIFLUCAN) 150 MG Tab Take 1 tablet by mouth weekly.    fluticasone propionate (FLONASE) 50 mcg/actuation nasal spray 2 sprays (100 mcg total) by Each Nare route once daily.    GUAIFENESIN (MUCINEX ORAL) Take 400 mg by mouth every 4 (four) hours as needed.     INV PROPULSID 10 MG Take 1 tablets (20 mg) by mouth 4 (four) times daily. FOR INVESTIGATIONAL USE ONLY. Protocol [id number]    leflunomide (ARAVA) 20 MG Tab Take 1 tablet (20 mg total) by mouth once daily.    montelukast (SINGULAIR) 10 mg tablet Take 1 tablet (10 mg total) by mouth nightly.    naproxen (NAPROSYN) 500 MG tablet TAKE 1 TABLET TWICE A DAY AS NEEDED    omeprazole (PRILOSEC) 40 MG capsule Take 1 capsule (40 mg total) by mouth every morning.    omeprazole-sodium bicarbonate (ZEGERID) 40-1.1 mg-gram per capsule TAKE 1 CAPSULE BY MOUTH EVERY MORNING.    POTASSIUM ORAL Take by mouth once daily.    predniSONE (DELTASONE) 5 MG tablet Take 2 tablets (10 mg total) by mouth once daily.    PREMARIN 0.625 mg tablet Take 0.625 mg by mouth once daily.    PREMARIN vaginal cream PLACE 0.5 G VAGINALLY 3 (THREE) TIMES A WEEK.    progesterone (PROMETRIUM) 100 MG capsule Take 100 mg by mouth every morning. 1 Capsule Oral Every day, morning  "   RESTASIS 0.05 % ophthalmic emulsion PLACE 0.4 MLS (1 DROP TOTAL) INTO BOTH EYES 2 (TWO) TIMES DAILY.    rizatriptan (MAXALT) 10 MG tablet Take 10 mg by mouth as needed for Migraine.     rosuvastatin (CRESTOR) 20 MG tablet Take 1 tablet (20 mg total) by mouth every evening.    sarilumab (KEVZARA) 200 mg/1.14 mL Syrg Inject 1.14 mLs (200 mg total) into the skin every 14 (fourteen) days.    sucralfate (CARAFATE) 1 gram tablet TAKE 1 TABLET (1 G TOTAL) BY MOUTH 4 (FOUR) TIMES DAILY.    syringe with needle (TUBERCULIN SYRINGE) 1 mL 27 x 1/2" Syrg To administer methotrexate 7.5mg sc once a week    valACYclovir (VALTREX) 1000 MG tablet TAKE 1 TABLET BY MOUTH TWICE A DAY AS NEEDED    albuterol (ACCUNEB) 1.25 mg/3 mL Nebu Take 3 mLs (1.25 mg total) by nebulization every 6 (six) hours as needed. Rescue    diazePAM (VALIUM) 10 MG Tab Take 1 tablet (10 mg total) by mouth daily as needed (30 minutes prior to procedure to help anxiety).     No current facility-administered medications for this visit.      Facility-Administered Medications Ordered in Other Visits   Medication    0.9%  NaCl infusion     ALLERGIES:   Review of patient's allergies indicates:   Allergen Reactions    Actemra [tocilizumab] Other (See Comments)     Severe dizziness    Codeine Nausea And Vomiting    Gold au 198 Hives and Rash    Hydroxychloroquine Other (See Comments)     Can't remember the reaction      Iodinated contrast- oral and iv dye Other (See Comments)     Other reaction(s): BURNING ALL OVER    Iodine Other (See Comments)     Other reaction(s): BURNING ALL OVER - Iodine dye - Not topical    Sulfa (sulfonamide antibiotics) Other (See Comments)     Can't remember the reaction    Zofran [ondansetron hcl (pf)] Nausea And Vomiting     Pt reports that last time she received zofran she started vomiting again    Methotrexate analogues Nausea Only    Pneumovax 23 [pneumococcal 23-argenis ps vaccine] Other (See Comments)     "sick" "             REVIEW OF SYSTEMS:   General ROS: negative  Psychological ROS: negative  Ophthalmic ROS: negative  ENT ROS: negative  Allergy and Immunology ROS: negative  Hematological and Lymphatic ROS: negative  Endocrine ROS: negative  Respiratory ROS: negative  Cardiovascular ROS: negative  Gastrointestinal ROS: see HPI  Genito-Urinary ROS: negative  Musculoskeletal ROS: see HPI  Neurological ROS: negative  Dermatological ROS: negative    PHYSICAL EXAM:   Vitals:    09/24/19 1107   BP: 135/81   Pulse: 97   Resp: 16   Temp: 98.3 °F (36.8 °C)       General - well developed, well nourished, no apparent distress  Head & Face - no sinus tenderness  Eyes - normal conjunctivae and lids   ENT - normal external auditory canals and tympanic membranes bilaterally oropharynx clear,  Normal dentition and gums  Neck - normal thyroid  Chest and Lung - normal respiratory effort, clear to auscultation bilaterally   Cardiovascular - RRR with no MGR, normal S1 and S2; no pedal edema  Abdomen -  soft, nontender, no palpable hepatomegaly or splenomegaly  Lymph - no palpable lymphadenopathy  Extremities - changes of RA   Heme - no bruising, petechiae, pallor  Skin - no rashes or lesions  Psych - appropriate mood and affect      ECOG Performance Status: (foot note - ECOG PS provided by [employer] Co[employer]cology Group) 1 - Symptomatic but completely ambulatory    Karnofsky Performance Score:  90%- Able to Carry on Normal Activity: Minor Symptoms of Disease  DATA:   Lab Results   Component Value Date    WBC 4.65 09/25/2019    HGB 12.6 09/25/2019    HCT 41.6 09/25/2019    MCV 91 09/25/2019     09/25/2019     Gran # (ANC)   Date Value Ref Range Status   09/25/2019 3.0 1.8 - 7.7 K/uL Final     Gran%   Date Value Ref Range Status   09/25/2019 64.5 38.0 - 73.0 % Final     CMP  Sodium   Date Value Ref Range Status   09/25/2019 142 136 - 145 mmol/L Final     Potassium   Date Value Ref Range Status   09/25/2019 4.3 3.5 - 5.1 mmol/L Final      Chloride   Date Value Ref Range Status   09/25/2019 109 95 - 110 mmol/L Final     CO2   Date Value Ref Range Status   09/25/2019 24 23 - 29 mmol/L Final     Glucose   Date Value Ref Range Status   09/25/2019 169 (H) 70 - 110 mg/dL Final     BUN, Bld   Date Value Ref Range Status   09/25/2019 14 6 - 20 mg/dL Final     Creatinine   Date Value Ref Range Status   09/25/2019 0.9 0.5 - 1.4 mg/dL Final     Calcium   Date Value Ref Range Status   09/25/2019 8.8 8.7 - 10.5 mg/dL Final     Total Protein   Date Value Ref Range Status   09/25/2019 6.2 6.0 - 8.4 g/dL Final     Albumin   Date Value Ref Range Status   09/25/2019 3.8 3.5 - 5.2 g/dL Final     Total Bilirubin   Date Value Ref Range Status   09/25/2019 0.4 0.1 - 1.0 mg/dL Final     Comment:     For infants and newborns, interpretation of results should be based  on gestational age, weight and in agreement with clinical  observations.  Premature Infant recommended reference ranges:  Up to 24 hours.............<8.0 mg/dL  Up to 48 hours............<12.0 mg/dL  3-5 days..................<15.0 mg/dL  6-29 days.................<15.0 mg/dL       Alkaline Phosphatase   Date Value Ref Range Status   09/25/2019 84 55 - 135 U/L Final     AST   Date Value Ref Range Status   09/25/2019 31 10 - 40 U/L Final     ALT   Date Value Ref Range Status   09/25/2019 28 10 - 44 U/L Final     Anion Gap   Date Value Ref Range Status   09/25/2019 9 8 - 16 mmol/L Final     eGFR if    Date Value Ref Range Status   09/25/2019 >60.0 >60 mL/min/1.73 m^2 Final     eGFR if non    Date Value Ref Range Status   09/25/2019 >60.0 >60 mL/min/1.73 m^2 Final     Comment:     Calculation used to obtain the estimated glomerular filtration  rate (eGFR) is the CKD-EPI equation.        Lab Results   Component Value Date    IRON 22 (L) 11/13/2017    TIBC 545 (H) 11/13/2017    FERRITIN 17 (L) 09/24/2019         ASSESSMENT AND PLAN:   Encounter Diagnosis   Name Primary?    Iron  deficiency anemia due to chronic blood loss Yes        Microcytic anemia likely ROBERT  due to malabsorption related to GI issues ; possible anemia of chronic inflammation from RA and meds    contributing  Denies any over GI bleeding; Recent summer 2017 upper and lower GI endoscopy negative; VCE deferred but may need if ROBERT persists  Intolerant to po iron   S/p feraheme x 2 jan 2018, may 2019 with normalization of hgb and ferritin   hgb normal today but ferritin today shows again iron deficient  Will schedule repeat feraheme weekly infusions x 2   Fu with rheum for RA  Recommend serial lab monitoring for further prn iron infusion needs particularly in light of down trending ferritin       Follow Up:   -schedule weekly feraheme infusion x 2 doses to start in next 1-2 weeks  -cbc, ferritin lab only in 3 months  -same labs and NP  appt in 6   months    Michael Woodson MD  Hematology/Oncology/Bone Marrow Transplant

## 2019-09-25 ENCOUNTER — OFFICE VISIT (OUTPATIENT)
Dept: GASTROENTEROLOGY | Facility: CLINIC | Age: 59
End: 2019-09-25
Payer: MEDICARE

## 2019-09-25 ENCOUNTER — HOSPITAL ENCOUNTER (OUTPATIENT)
Dept: CARDIOLOGY | Facility: CLINIC | Age: 59
Discharge: HOME | End: 2019-09-25
Payer: MEDICARE

## 2019-09-25 ENCOUNTER — RESEARCH ENCOUNTER (OUTPATIENT)
Dept: RESEARCH | Facility: HOSPITAL | Age: 59
End: 2019-09-25

## 2019-09-25 VITALS
HEIGHT: 61 IN | SYSTOLIC BLOOD PRESSURE: 137 MMHG | HEART RATE: 104 BPM | DIASTOLIC BLOOD PRESSURE: 89 MMHG | WEIGHT: 163 LBS | BODY MASS INDEX: 30.78 KG/M2

## 2019-09-25 DIAGNOSIS — Z00.6 RESEARCH STUDY PATIENT: ICD-10-CM

## 2019-09-25 DIAGNOSIS — M84.474S METATARSAL FRACTURE, PATHOLOGIC, RIGHT, SEQUELA: Primary | ICD-10-CM

## 2019-09-25 DIAGNOSIS — K31.84 GASTROPARESIS: ICD-10-CM

## 2019-09-25 DIAGNOSIS — K21.9 GASTROESOPHAGEAL REFLUX DISEASE WITHOUT ESOPHAGITIS: ICD-10-CM

## 2019-09-25 DIAGNOSIS — K31.84 GASTROPARESIS: Primary | ICD-10-CM

## 2019-09-25 PROCEDURE — 3008F BODY MASS INDEX DOCD: CPT | Mod: CPTII,S$GLB,, | Performed by: INTERNAL MEDICINE

## 2019-09-25 PROCEDURE — 93005 EKG 12-LEAD: ICD-10-PCS | Mod: S$GLB,,, | Performed by: INTERNAL MEDICINE

## 2019-09-25 PROCEDURE — 3008F PR BODY MASS INDEX (BMI) DOCUMENTED: ICD-10-PCS | Mod: CPTII,S$GLB,, | Performed by: INTERNAL MEDICINE

## 2019-09-25 PROCEDURE — 99215 PR OFFICE/OUTPT VISIT, EST, LEVL V, 40-54 MIN: ICD-10-PCS | Mod: S$GLB,,, | Performed by: INTERNAL MEDICINE

## 2019-09-25 PROCEDURE — 93010 EKG 12-LEAD: ICD-10-PCS | Mod: S$GLB,,, | Performed by: INTERNAL MEDICINE

## 2019-09-25 PROCEDURE — 93010 ELECTROCARDIOGRAM REPORT: CPT | Mod: S$GLB,,, | Performed by: INTERNAL MEDICINE

## 2019-09-25 PROCEDURE — 99999 PR PBB SHADOW E&M-EST. PATIENT-LVL III: CPT | Mod: PBBFAC,,, | Performed by: INTERNAL MEDICINE

## 2019-09-25 PROCEDURE — 99999 PR PBB SHADOW E&M-EST. PATIENT-LVL III: ICD-10-PCS | Mod: PBBFAC,,, | Performed by: INTERNAL MEDICINE

## 2019-09-25 PROCEDURE — 99215 OFFICE O/P EST HI 40 MIN: CPT | Mod: S$GLB,,, | Performed by: INTERNAL MEDICINE

## 2019-09-25 PROCEDURE — 93005 ELECTROCARDIOGRAM TRACING: CPT | Mod: S$GLB,,, | Performed by: INTERNAL MEDICINE

## 2019-09-25 NOTE — PROGRESS NOTES
Subjective:       Patient ID: Joyce Peterson is a 58 y.o. female.    Chief Complaint: Follow-up (Gastroparesis)    HPI  Review of Systems   Constitutional: Negative for activity change, appetite change, chills, diaphoresis, fatigue, fever and unexpected weight change.   HENT: Negative for congestion, ear pain, mouth sores, nosebleeds, postnasal drip, rhinorrhea, sinus pressure, sore throat, trouble swallowing and voice change.    Eyes: Negative for pain.   Respiratory: Negative for cough, shortness of breath and wheezing.    Cardiovascular: Negative for chest pain, palpitations and leg swelling.   Genitourinary: Positive for dysuria. Negative for difficulty urinating, flank pain, hematuria and menstrual problem.   Musculoskeletal: Positive for arthralgias and gait problem. Negative for back pain, joint swelling, myalgias and neck pain.   Skin: Negative for rash.   Neurological: Negative for dizziness, tremors, syncope, numbness and headaches.   Hematological: Negative for adenopathy. Does not bruise/bleed easily.   Psychiatric/Behavioral: Negative for agitation, behavioral problems, confusion, decreased concentration and dysphoric mood. The patient is not nervous/anxious.        Objective:      Physical Exam   Constitutional: She is oriented to person, place, and time. She appears well-developed and well-nourished. No distress.   HENT:   Head: Normocephalic and atraumatic.   Right Ear: External ear normal.   Left Ear: External ear normal.   Nose: Nose normal.   Mouth/Throat: Oropharynx is clear and moist. No oropharyngeal exudate.   Eyes: Pupils are equal, round, and reactive to light. Conjunctivae are normal. No scleral icterus.   Neck: Normal range of motion. Neck supple. No thyromegaly present.   Cardiovascular: Normal rate, regular rhythm, normal heart sounds and intact distal pulses. Exam reveals no gallop.   No murmur heard.  Pulmonary/Chest: Effort normal and breath sounds normal. She has no wheezes. She  has no rales.   Abdominal: Soft. Bowel sounds are normal. She exhibits no distension and no mass. There is no tenderness. There is no rebound and no guarding.   Musculoskeletal: Normal range of motion. She exhibits no edema or tenderness.   Lymphadenopathy:     She has no cervical adenopathy.   Neurological: She is alert and oriented to person, place, and time. No cranial nerve deficit.   Skin: Skin is warm and dry. No rash noted.   Psychiatric: She has a normal mood and affect. Her behavior is normal. Judgment and thought content normal.       Assessment:       1. Gastroparesis    2. Gastroesophageal reflux disease without esophagitis    3. Research study patient        Plan:         Joyce is here for follow-up.  She is on the compassionate research project for cisapride for gastroparesis.  Because of the gastroparesis she has significant reflux symptoms.  Overall the symptoms are doing well. She only gets reflux symptoms when she has significant constipation. But as long she takes Colace and avoids a constipation than reflux symptoms are not bad.  She still has to be careful on her diet and avoid spicy food.  She denies any dysphagia.  There is no nausea or vomiting. She does have early satiety with her meals as she has had in the past.  There is no severe prolonged postprandial fullness.  No severe abdominal pain. With the Colace she has a bowel movement every day.    She has had a number of issues since I have seen her last.  She had hip surgery and recurrent UTIs.  She has a broken leg now.    Assessment  1.  Gastroparesis: we discussed the other options are available for gastroparesis.  We discussed surgical procedures that can be of benefit to some patients.  I think that given her other complications and medical problems that surgery should be a last resort when the the things fail.  She wants to continue on the cisapride.  She feels like the cisapride gives her benefit for the gastroparesis.  I see no  reason why it can't be continued.  I reviewed her EKG and it looks like the QTC is acceptable.  2.  Gastroesophageal reflux symptoms seem to be under good control right now will continue current treatment  3.  Research study participation.  I will fill out the forms to enable her to continue with the project.    45 min appointment greater than half time face-to-face counseling with time spent filling out forms.

## 2019-09-26 ENCOUNTER — PATIENT MESSAGE (OUTPATIENT)
Dept: HEMATOLOGY/ONCOLOGY | Facility: CLINIC | Age: 59
End: 2019-09-26

## 2019-09-26 LAB
IGG1 SER-MCNC: 348 MG/DL (ref 382–929)
IGG2 SER-MCNC: 235 MG/DL (ref 242–700)
IGG3 SER-MCNC: 36 MG/DL (ref 22–176)
IGG4 SER-MCNC: 5 MG/DL (ref 4–86)

## 2019-09-26 RX ORDER — HEPARIN 100 UNIT/ML
500 SYRINGE INTRAVENOUS
Status: CANCELLED | OUTPATIENT
Start: 2019-10-30

## 2019-09-26 RX ORDER — SODIUM CHLORIDE 0.9 % (FLUSH) 0.9 %
10 SYRINGE (ML) INJECTION
Status: CANCELLED | OUTPATIENT
Start: 2019-09-26

## 2019-09-26 RX ORDER — SODIUM CHLORIDE 0.9 % (FLUSH) 0.9 %
10 SYRINGE (ML) INJECTION
Status: CANCELLED | OUTPATIENT
Start: 2019-10-30

## 2019-09-26 RX ORDER — HEPARIN 100 UNIT/ML
500 SYRINGE INTRAVENOUS
Status: CANCELLED | OUTPATIENT
Start: 2019-09-26

## 2019-09-26 NOTE — PROGRESS NOTES
LIMITED ACCESS PROTOCOL FOR THE USE OF ORAL CISAPRIDE IN THE TREATMENT OF REFRACTORY GASTROESOPHAGEAL REFLUX DISEASE AND OTHER GASTROINTESTINAL MOTILITY DISORDERS - OMKAR MOORE MD  IRB #: [id number]   -  OMKAR MOORE MD     Ms. Joyce Peterson was seen in the clinic for her semi-annual follow-up visit. She stated that she is still receiving therapeutic benefit from Cisapride. She denied any new questions regarding the study or her participation and verbalized her willingness to continue.    Her lab work & EKG were completed on 09/26/2019 and reviewed by Dr. Moore prior to the visit.     Dr. Moore performed a physical exam and reviewed her chart since the last study contact and determined she is eligible to continue Cisapride.    Study staff at this visit was Theresa Quezada [facility]

## 2019-09-27 PROBLEM — M79.18 BUTTOCK PAIN: Status: ACTIVE | Noted: 2019-09-27

## 2019-09-27 PROBLEM — R29.898 LEFT LEG WEAKNESS: Status: RESOLVED | Noted: 2019-06-06 | Resolved: 2019-09-27

## 2019-09-27 PROBLEM — Z74.09 IMPAIRED FUNCTIONAL MOBILITY, BALANCE, GAIT, AND ENDURANCE: Status: RESOLVED | Noted: 2019-06-13 | Resolved: 2019-09-27

## 2019-09-29 DIAGNOSIS — K31.84 GASTROPARESIS: ICD-10-CM

## 2019-09-30 ENCOUNTER — TELEPHONE (OUTPATIENT)
Dept: HEMATOLOGY/ONCOLOGY | Facility: CLINIC | Age: 59
End: 2019-09-30

## 2019-09-30 DIAGNOSIS — J30.89 PERENNIAL ALLERGIC RHINITIS: ICD-10-CM

## 2019-09-30 RX ORDER — MONTELUKAST SODIUM 10 MG/1
10 TABLET ORAL NIGHTLY
Qty: 90 TABLET | Refills: 3 | Status: SHIPPED | OUTPATIENT
Start: 2019-09-30 | End: 2020-09-08 | Stop reason: SDUPTHER

## 2019-09-30 RX ORDER — SUCRALFATE 1 G/1
1 TABLET ORAL 4 TIMES DAILY
Qty: 360 TABLET | Refills: 3 | Status: SHIPPED | OUTPATIENT
Start: 2019-09-30 | End: 2020-09-03 | Stop reason: SDUPTHER

## 2019-09-30 NOTE — TELEPHONE ENCOUNTER
Called patient to schedule iron infusion she stated she was leaving to go out of town and she will be back on Oct 28. She scheduled for 10/29 & 11/5. Mailed appt slips.    ----- Message from Jamaica Millard RN sent at 9/29/2019 10:51 AM CDT -----  Looks like it is approved       -schedule weekly feraheme infusion x 2 doses to start in next 1-2 weeks     -Chintan Nelson

## 2019-10-01 ENCOUNTER — INFUSION (OUTPATIENT)
Dept: INFECTIOUS DISEASES | Facility: HOSPITAL | Age: 59
End: 2019-10-01
Attending: INTERNAL MEDICINE
Payer: MEDICARE

## 2019-10-01 VITALS — BODY MASS INDEX: 30.76 KG/M2 | WEIGHT: 162.94 LBS | HEIGHT: 61 IN

## 2019-10-01 DIAGNOSIS — M85.80 OSTEOPENIA, UNSPECIFIED LOCATION: Primary | ICD-10-CM

## 2019-10-01 PROCEDURE — 63600175 PHARM REV CODE 636 W HCPCS: Mod: JG | Performed by: INTERNAL MEDICINE

## 2019-10-01 PROCEDURE — 96372 THER/PROPH/DIAG INJ SC/IM: CPT

## 2019-10-01 RX ADMIN — DENOSUMAB 60 MG: 60 INJECTION SUBCUTANEOUS at 10:10

## 2019-10-02 RX ORDER — FLUTICASONE PROPIONATE 50 MCG
2 SPRAY, SUSPENSION (ML) NASAL DAILY
Qty: 1 BOTTLE | Refills: 3 | Status: SHIPPED | OUTPATIENT
Start: 2019-10-02 | End: 2020-02-17

## 2019-10-04 ENCOUNTER — PATIENT MESSAGE (OUTPATIENT)
Dept: PODIATRY | Facility: CLINIC | Age: 59
End: 2019-10-04

## 2019-10-07 DIAGNOSIS — Z00.6 PATIENT IN CLINICAL RESEARCH STUDY: Primary | ICD-10-CM

## 2019-10-08 ENCOUNTER — PATIENT MESSAGE (OUTPATIENT)
Dept: RHEUMATOLOGY | Facility: CLINIC | Age: 59
End: 2019-10-08

## 2019-10-08 ENCOUNTER — TELEPHONE (OUTPATIENT)
Dept: RHEUMATOLOGY | Facility: CLINIC | Age: 59
End: 2019-10-08

## 2019-10-08 DIAGNOSIS — J45.20 CHRONIC ASTHMA, MILD INTERMITTENT, UNCOMPLICATED: ICD-10-CM

## 2019-10-08 RX ORDER — BUDESONIDE AND FORMOTEROL FUMARATE DIHYDRATE 160; 4.5 UG/1; UG/1
2 AEROSOL RESPIRATORY (INHALATION) EVERY 12 HOURS
Qty: 3 INHALER | Refills: 3 | Status: SHIPPED | OUTPATIENT
Start: 2019-10-08 | End: 2020-12-22 | Stop reason: SDUPTHER

## 2019-10-08 RX ORDER — TRAMADOL HYDROCHLORIDE 50 MG/1
50 TABLET ORAL
Qty: 7 TABLET | Refills: 0 | Status: SHIPPED | OUTPATIENT
Start: 2019-10-08 | End: 2021-06-17

## 2019-10-29 ENCOUNTER — INFUSION (OUTPATIENT)
Dept: INFUSION THERAPY | Facility: HOSPITAL | Age: 59
End: 2019-10-29
Attending: INTERNAL MEDICINE
Payer: MEDICARE

## 2019-10-29 ENCOUNTER — PATIENT MESSAGE (OUTPATIENT)
Dept: HEMATOLOGY/ONCOLOGY | Facility: CLINIC | Age: 59
End: 2019-10-29

## 2019-10-29 VITALS
BODY MASS INDEX: 30.76 KG/M2 | WEIGHT: 162.94 LBS | TEMPERATURE: 98 F | DIASTOLIC BLOOD PRESSURE: 77 MMHG | RESPIRATION RATE: 18 BRPM | SYSTOLIC BLOOD PRESSURE: 131 MMHG | HEART RATE: 95 BPM | HEIGHT: 61 IN

## 2019-10-29 DIAGNOSIS — D50.0 IRON DEFICIENCY ANEMIA DUE TO CHRONIC BLOOD LOSS: Primary | ICD-10-CM

## 2019-10-29 PROCEDURE — 96374 THER/PROPH/DIAG INJ IV PUSH: CPT

## 2019-10-29 PROCEDURE — 63600175 PHARM REV CODE 636 W HCPCS: Mod: JG | Performed by: INTERNAL MEDICINE

## 2019-10-29 RX ORDER — SODIUM CHLORIDE 0.9 % (FLUSH) 0.9 %
10 SYRINGE (ML) INJECTION
Status: DISCONTINUED | OUTPATIENT
Start: 2019-10-29 | End: 2019-10-29 | Stop reason: HOSPADM

## 2019-10-29 RX ORDER — HEPARIN 100 UNIT/ML
500 SYRINGE INTRAVENOUS
Status: DISCONTINUED | OUTPATIENT
Start: 2019-10-29 | End: 2019-10-29 | Stop reason: HOSPADM

## 2019-10-29 RX ADMIN — FERUMOXYTOL 510 MG: 510 INJECTION INTRAVENOUS at 02:10

## 2019-10-30 ENCOUNTER — PATIENT OUTREACH (OUTPATIENT)
Dept: ADMINISTRATIVE | Facility: OTHER | Age: 59
End: 2019-10-30

## 2019-10-30 ENCOUNTER — IMMUNIZATION (OUTPATIENT)
Dept: PHARMACY | Facility: CLINIC | Age: 59
End: 2019-10-30
Payer: MEDICARE

## 2019-10-30 RX ORDER — FLUCONAZOLE 100 MG/1
100 TABLET ORAL DAILY
Qty: 14 TABLET | Refills: 3 | Status: SHIPPED | OUTPATIENT
Start: 2019-10-30 | End: 2019-11-13

## 2019-10-31 ENCOUNTER — APPOINTMENT (OUTPATIENT)
Dept: RADIOLOGY | Facility: OTHER | Age: 59
End: 2019-10-31
Attending: PODIATRIST
Payer: MEDICARE

## 2019-10-31 ENCOUNTER — OFFICE VISIT (OUTPATIENT)
Dept: PODIATRY | Facility: CLINIC | Age: 59
End: 2019-10-31
Payer: MEDICARE

## 2019-10-31 VITALS — BODY MASS INDEX: 30.58 KG/M2 | HEIGHT: 61 IN | WEIGHT: 162 LBS

## 2019-10-31 DIAGNOSIS — M06.9 RHEUMATOID ARTHRITIS INVOLVING MULTIPLE SITES, UNSPECIFIED RHEUMATOID FACTOR PRESENCE: ICD-10-CM

## 2019-10-31 DIAGNOSIS — M84.474S METATARSAL FRACTURE, PATHOLOGIC, RIGHT, SEQUELA: ICD-10-CM

## 2019-10-31 DIAGNOSIS — M84.474S METATARSAL FRACTURE, PATHOLOGIC, RIGHT, SEQUELA: Primary | ICD-10-CM

## 2019-10-31 DIAGNOSIS — Z87.312 HISTORY OF STRESS FRACTURE: ICD-10-CM

## 2019-10-31 PROCEDURE — 73630 X-RAY EXAM OF FOOT: CPT | Mod: TC,RT

## 2019-10-31 PROCEDURE — 73630 X-RAY EXAM OF FOOT: CPT | Mod: 26,RT,, | Performed by: RADIOLOGY

## 2019-10-31 PROCEDURE — 99214 OFFICE O/P EST MOD 30 MIN: CPT | Mod: S$GLB,,, | Performed by: PODIATRIST

## 2019-10-31 PROCEDURE — 3008F BODY MASS INDEX DOCD: CPT | Mod: CPTII,S$GLB,, | Performed by: PODIATRIST

## 2019-10-31 PROCEDURE — 99999 PR PBB SHADOW E&M-EST. PATIENT-LVL IV: CPT | Mod: PBBFAC,,, | Performed by: PODIATRIST

## 2019-10-31 PROCEDURE — 99214 PR OFFICE/OUTPT VISIT, EST, LEVL IV, 30-39 MIN: ICD-10-PCS | Mod: S$GLB,,, | Performed by: PODIATRIST

## 2019-10-31 PROCEDURE — 99999 PR PBB SHADOW E&M-EST. PATIENT-LVL IV: ICD-10-PCS | Mod: PBBFAC,,, | Performed by: PODIATRIST

## 2019-10-31 PROCEDURE — 73630 XR FOOT COMPLETE 3 VIEW RIGHT: ICD-10-PCS | Mod: 26,RT,, | Performed by: RADIOLOGY

## 2019-10-31 PROCEDURE — 3008F PR BODY MASS INDEX (BMI) DOCUMENTED: ICD-10-PCS | Mod: CPTII,S$GLB,, | Performed by: PODIATRIST

## 2019-10-31 RX ORDER — DIAZEPAM 10 MG/1
10 TABLET ORAL
Qty: 3 TABLET | Refills: 0 | Status: SHIPPED | OUTPATIENT
Start: 2019-10-31 | End: 2020-02-28

## 2019-10-31 NOTE — PROGRESS NOTES
Chief Complaint   Patient presents with    Foot Pain     Right foot          HPI:       Joyce Peterson is a 59 y.o. female who presents to clinic for chronic right foot pain.  She does not recall acute trauma to the area.   She has history of Rheumatoid Arthritis,  Patient has history of multiple idiopathic metatarsal fractures of the right foot.   She is on Prednisone.    She has difficulty walking.  There is pain at the ball of the foot when walking, even with athletic shoes.  She uses a wheelchair when she is on vacation.  She has tried a CAM boot for a few weeks.              Patient Active Problem List   Diagnosis    Osteoarthritis    GERD (gastroesophageal reflux disease)    Gastroparesis    Osteopenia    Coronary artery disease    Lumbar spondylosis    CS (cervical spondylosis)    Thyroid nodule    Hyperlipidemia    Uncomplicated asthma    AR (allergic rhinitis)    Immunosuppressed status    Rheumatoid arthritis involving multiple sites    Carpal tunnel syndrome of right wrist    Elevated parathyroid hormone    Hypogammaglobulinemia    Right carpal tunnel syndrome    Pain in right hand    Pain in right elbow    Decreased  strength of right hand    Right shoulder pain    Iron deficiency anemia due to chronic blood loss    Pure hypercholesterolemia    Research study patient    Sjogren's syndrome    Esophageal candidiasis    Rupture of left triceps tendon    Left hip pain    Gait abnormality    Greater trochanteric bursitis of left hip    Stress fracture of neck of left femur    Stress fracture of neck of femur with delayed healing    Coronary artery disease involving native coronary artery of native heart without angina pectoris    Buttock pain               Current Outpatient Medications on File Prior to Visit   Medication Sig Dispense Refill    albuterol (ACCUNEB) 1.25 mg/3 mL Nebu Take 3 mLs (1.25 mg total) by nebulization every 6 (six) hours as needed. Rescue 1  Box 2    albuterol (PROVENTIL HFA) 90 mcg/actuation inhaler Inhale 2 puffs into the lungs every 6 (six) hours as needed. Rescue 20.1 Inhaler 0    amoxicillin (AMOXIL) 500 MG capsule Take 1 capsule by mouth three times a day. 30 capsule 0    ampicillin (PRINCIPEN) 500 MG capsule TAKE 1 CAPUSLE BY MOUTH EVERY 6 HOURS  2    aspirin 81 mg Tab Take 81 mg by mouth every morning.       azelastine (ASTELIN) 137 mcg nasal spray 1 spray (137 mcg total) by Nasal route 2 (two) times daily. (Patient taking differently: 1 spray by Nasal route 2 (two) times daily as needed. ) 30 mL 11    benzonatate (TESSALON) 100 MG capsule Take 1 capsule by mouth three times a day 9 capsule 0    budesonide-formoterol 160-4.5 mcg (SYMBICORT) 160-4.5 mcg/actuation HFAA Inhale 2 puffs into the lungs every 12 (twelve) hours. 3 Inhaler 3    calcium citrate-vitamin D (CITRACAL + D) 315-200 mg-unit per tablet Take 1 tablet by mouth 2 (two) times daily.       diclofenac sodium (VOLTAREN) 1 % Gel Apply 2 g topically 4 (four) times daily as needed. 500 g 5    fluconazole (DIFLUCAN) 100 MG tablet Take 100 mg by mouth once daily.  3    fluconazole (DIFLUCAN) 100 MG tablet TAKE 1 TABLET (100 MG TOTAL) BY MOUTH ONCE DAILY. 14 tablet 3    fluconazole (DIFLUCAN) 150 MG Tab Take 1 tablet by mouth weekly. 2 tablet 0    fluticasone propionate (FLONASE) 50 mcg/actuation nasal spray 2 sprays (100 mcg total) by Each Nostril route once daily. 1 Bottle 3    GUAIFENESIN (MUCINEX ORAL) Take 400 mg by mouth every 4 (four) hours as needed.       INV PROPULSID 10 MG Take 1 tablets (20 mg) by mouth 4 (four) times daily. FOR INVESTIGATIONAL USE ONLY. Protocol CIS-USA-[id number] 1560 each 0    leflunomide (ARAVA) 20 MG Tab Take 1 tablet (20 mg total) by mouth once daily. 30 tablet 2    montelukast (SINGULAIR) 10 mg tablet Take 1 tablet (10 mg total) by mouth nightly. 90 tablet 3    naproxen (NAPROSYN) 500 MG tablet TAKE 1 TABLET TWICE A DAY AS NEEDED 180 tablet 0  "   omeprazole (PRILOSEC) 40 MG capsule Take 1 capsule (40 mg total) by mouth every morning. 30 capsule 6    omeprazole-sodium bicarbonate (ZEGERID) 40-1.1 mg-gram per capsule TAKE 1 CAPSULE BY MOUTH EVERY MORNING. 90 capsule 3    POTASSIUM ORAL Take by mouth once daily.      predniSONE (DELTASONE) 5 MG tablet Take 2 tablets (10 mg total) by mouth once daily. 180 tablet 1    PREMARIN 0.625 mg tablet Take 0.625 mg by mouth once daily.  4    PREMARIN vaginal cream PLACE 0.5 G VAGINALLY 3 (THREE) TIMES A WEEK. 30 g 3    progesterone (PROMETRIUM) 100 MG capsule Take 100 mg by mouth every morning. 1 Capsule Oral Every day, morning      promethazine-dextromethorphan (PROMETHAZINE-DM) 6.25-15 mg/5 mL Syrp Take 5 mL by mouth every 6 hours as needed 140 mL 0    RESTASIS 0.05 % ophthalmic emulsion PLACE 0.4 MLS (1 DROP TOTAL) INTO BOTH EYES 2 (TWO) TIMES DAILY.  5    rizatriptan (MAXALT) 10 MG tablet Take 10 mg by mouth as needed for Migraine.       rosuvastatin (CRESTOR) 20 MG tablet Take 1 tablet (20 mg total) by mouth every evening. 90 tablet 3    sarilumab (KEVZARA) 200 mg/1.14 mL Syrg Inject 1.14 mLs (200 mg total) into the skin every 14 (fourteen) days. 2.28 mL 2    sucralfate (CARAFATE) 1 gram tablet TAKE 1 TABLET (1 G TOTAL) BY MOUTH 4 (FOUR) TIMES DAILY. 360 tablet 3    syringe with needle (TUBERCULIN SYRINGE) 1 mL 27 x 1/2" Syrg To administer methotrexate 7.5mg sc once a week 12 Syringe 3    traMADol (ULTRAM) 50 mg tablet Take 1 tablet (50 mg total) by mouth every 24 hours as needed for Pain. 7 tablet 0    valACYclovir (VALTREX) 1000 MG tablet TAKE 1 TABLET BY MOUTH TWICE A DAY AS NEEDED 20 tablet 3     Current Facility-Administered Medications on File Prior to Visit   Medication Dose Route Frequency Provider Last Rate Last Dose    0.9%  NaCl infusion   Intravenous Continuous Callum Singer MD   Stopped at 05/21/19 1100           Review of patient's allergies indicates:   Allergen Reactions    " "Actemra [tocilizumab] Other (See Comments)     Severe dizziness    Codeine Nausea And Vomiting    Gold au 198 Hives and Rash    Hydroxychloroquine Other (See Comments)     Can't remember the reaction      Iodinated contrast media - oral and iv dye Other (See Comments)     Other reaction(s): BURNING ALL OVER    Iodine Other (See Comments)     Other reaction(s): BURNING ALL OVER - Iodine dye - Not topical    Sulfa (sulfonamide antibiotics) Other (See Comments)     Can't remember the reaction    Zofran [ondansetron hcl (pf)] Nausea And Vomiting     Pt reports that last time she received zofran she started vomiting again    Methotrexate analogues Nausea Only    Pneumovax 23 [pneumococcal 23-argenis ps vaccine] Other (See Comments)     "sick"           Social History     Socioeconomic History    Marital status:      Spouse name: Not on file    Number of children: Not on file    Years of education: Not on file    Highest education level: Not on file   Occupational History    Not on file   Social Needs    Financial resource strain: Not on file    Food insecurity:     Worry: Not on file     Inability: Not on file    Transportation needs:     Medical: Not on file     Non-medical: Not on file   Tobacco Use    Smoking status: Never Smoker    Smokeless tobacco: Never Used   Substance and Sexual Activity    Alcohol use: Yes     Comment: occasionally    Drug use: No    Sexual activity: Yes     Partners: Male     Birth control/protection: Surgical   Lifestyle    Physical activity:     Days per week: Not on file     Minutes per session: Not on file    Stress: Not on file   Relationships    Social connections:     Talks on phone: Not on file     Gets together: Not on file     Attends Orthodox service: Not on file     Active member of club or organization: Not on file     Attends meetings of clubs or organizations: Not on file     Relationship status: Not on file   Other Topics Concern    Are you " "pregnant or think you may be? Not Asked    Breast-feeding Not Asked   Social History Narrative    Not on file           ROS:  General ROS: negative for  chills, fatigue or fever  Cardiovascular ROS: no chest pain or dyspnea on exertion  Musculoskeletal ROS: negative for joint pain or joint stiffness.  Negative for loss of strength.  Positive for foot pain.   Neuro ROS: Negative for syncope, numbness, or muscle weakness  Skin ROS: Negative for rash, itching or nail/hair changes.           OBJECTIVE:         Vitals:    10/31/19 1439   Weight: 73.5 kg (162 lb)   Height: 5' 1" (1.549 m)        Bilateral  Lower extremity exam:    Vasc: Palpable pedal pulses. Feet appropriately warm to touch. Cap refill time is within normal limits.  There is edema to the right forefoot.      Neurological:  Light touch, proprioception, and Sharp/dull sensation are all intact. There is no Tinel's along the tarsal tunnel.    Negative genesis's.  No sensorimotor deficitis.     Derm: No open lesions, macerations, or rashes.  No bruising.  No open wounds.   Redness over the bilateral bunions and tailor's bunions      MSK:  Mild  tenderness to palpation near the 4th or 5th metatarsal shaft;   bilateral bunions and tailors bunion with redness 2/2 shoe irritation, worse on the right.    Limited 1st metatarso-phalangeal joint range of motion.   bilateral adductovarus 5th toes.       8/21/19  FINDINGS:  Healing fractures of the 3rd, 4th and 5th metatarsal bones identified.  The position alignment is unchanged as compared to the previous study.  DJD, hallux valgus and bunion formation.  Small calcification noted the adjacent to the 1st metatarsophalangeal joint medially.  Pes planus.      9/11/19  Impression       No significant change in appearance of the 3rd and 4th metatarsal fractures with callus formations and persistent lucent fracture line.    Unchanged appearance of the 5th metatarsal fracture which appears well healed.       10/31/19 "   Impression       There is a 2nd metatarsal fracture which was suggested on 09/20/2019 MRI.  There has been small amount of callus formation and no significant displacement.    Similar appearance of the well callused 3rd through 5th metatarsal fractures.    Degenerative changes of the foot as above.               ASSESSMENT/PLAN:        Metatarsal fracture, pathologic, right, sequela  -     X-Ray Foot Complete Right; Future; Expected date: 10/31/2019  -     MRI Foot (Midfoot) Right W W/O Contrast; Future; Expected date: 10/31/2019    History of stress fracture  -     X-Ray Foot Complete Right; Future; Expected date: 10/31/2019  -     MRI Foot (Midfoot) Right W W/O Contrast; Future; Expected date: 10/31/2019    Rheumatoid arthritis involving multiple sites, unspecified rheumatoid factor presence  -     X-Ray Foot Complete Right; Future; Expected date: 10/31/2019  -     MRI Foot (Midfoot) Right W W/O Contrast; Future; Expected date: 10/31/2019    Other orders  -     diazePAM (VALIUM) 10 MG Tab; Take 1 tablet (10 mg total) by mouth On call Procedure.  Dispense: 3 tablet; Refill: 0         · I counseled the patient on the patient's conditions, their implications and medical management.  Xrays reviewed.   · CAM boot (she has at home)  Rest and elevate.   · Still having discomfort and pain when walking.  Can't walk comfortably even for short trips.  MRI ordered.  Will call with results    · Monitor for worsening signs and symptoms.

## 2019-11-04 RX ORDER — ALBUTEROL SULFATE 90 UG/1
2 AEROSOL, METERED RESPIRATORY (INHALATION) EVERY 6 HOURS PRN
Qty: 20.1 INHALER | Refills: 11 | Status: SHIPPED | OUTPATIENT
Start: 2019-11-04 | End: 2020-06-10 | Stop reason: SDUPTHER

## 2019-11-05 ENCOUNTER — INFUSION (OUTPATIENT)
Dept: INFUSION THERAPY | Facility: HOSPITAL | Age: 59
End: 2019-11-05
Attending: INTERNAL MEDICINE
Payer: MEDICARE

## 2019-11-05 ENCOUNTER — PATIENT OUTREACH (OUTPATIENT)
Dept: ADMINISTRATIVE | Facility: OTHER | Age: 59
End: 2019-11-05

## 2019-11-05 ENCOUNTER — OFFICE VISIT (OUTPATIENT)
Dept: ENDOCRINOLOGY | Facility: CLINIC | Age: 59
End: 2019-11-05
Payer: MEDICARE

## 2019-11-05 VITALS
BODY MASS INDEX: 29.78 KG/M2 | DIASTOLIC BLOOD PRESSURE: 74 MMHG | HEIGHT: 61 IN | HEART RATE: 94 BPM | SYSTOLIC BLOOD PRESSURE: 100 MMHG | RESPIRATION RATE: 16 BRPM | WEIGHT: 157.75 LBS

## 2019-11-05 VITALS — RESPIRATION RATE: 18 BRPM | HEART RATE: 113 BPM | DIASTOLIC BLOOD PRESSURE: 68 MMHG | SYSTOLIC BLOOD PRESSURE: 134 MMHG

## 2019-11-05 DIAGNOSIS — D50.0 IRON DEFICIENCY ANEMIA DUE TO CHRONIC BLOOD LOSS: Primary | ICD-10-CM

## 2019-11-05 DIAGNOSIS — T38.0X5A STEROID-INDUCED OSTEOPOROSIS: ICD-10-CM

## 2019-11-05 DIAGNOSIS — M84.352A STRESS FRACTURE OF NECK OF LEFT FEMUR: Primary | ICD-10-CM

## 2019-11-05 DIAGNOSIS — E04.1 THYROID NODULE: ICD-10-CM

## 2019-11-05 DIAGNOSIS — M81.8 STEROID-INDUCED OSTEOPOROSIS: ICD-10-CM

## 2019-11-05 DIAGNOSIS — M05.79 RHEUMATOID ARTHRITIS INVOLVING MULTIPLE SITES WITH POSITIVE RHEUMATOID FACTOR: ICD-10-CM

## 2019-11-05 PROCEDURE — 63600175 PHARM REV CODE 636 W HCPCS: Mod: JG | Performed by: INTERNAL MEDICINE

## 2019-11-05 PROCEDURE — 96374 THER/PROPH/DIAG INJ IV PUSH: CPT

## 2019-11-05 PROCEDURE — 99214 OFFICE O/P EST MOD 30 MIN: CPT | Mod: S$GLB,,, | Performed by: INTERNAL MEDICINE

## 2019-11-05 PROCEDURE — 99214 PR OFFICE/OUTPT VISIT, EST, LEVL IV, 30-39 MIN: ICD-10-PCS | Mod: S$GLB,,, | Performed by: INTERNAL MEDICINE

## 2019-11-05 PROCEDURE — 3008F BODY MASS INDEX DOCD: CPT | Mod: CPTII,S$GLB,, | Performed by: INTERNAL MEDICINE

## 2019-11-05 PROCEDURE — 99999 PR PBB SHADOW E&M-EST. PATIENT-LVL V: ICD-10-PCS | Mod: PBBFAC,,, | Performed by: INTERNAL MEDICINE

## 2019-11-05 PROCEDURE — 3008F PR BODY MASS INDEX (BMI) DOCUMENTED: ICD-10-PCS | Mod: CPTII,S$GLB,, | Performed by: INTERNAL MEDICINE

## 2019-11-05 PROCEDURE — 99999 PR PBB SHADOW E&M-EST. PATIENT-LVL V: CPT | Mod: PBBFAC,,, | Performed by: INTERNAL MEDICINE

## 2019-11-05 RX ORDER — SODIUM CHLORIDE 0.9 % (FLUSH) 0.9 %
10 SYRINGE (ML) INJECTION
Status: DISCONTINUED | OUTPATIENT
Start: 2019-11-05 | End: 2019-11-05 | Stop reason: HOSPADM

## 2019-11-05 RX ORDER — HEPARIN 100 UNIT/ML
500 SYRINGE INTRAVENOUS
Status: DISCONTINUED | OUTPATIENT
Start: 2019-11-05 | End: 2019-11-05 | Stop reason: HOSPADM

## 2019-11-05 RX ADMIN — FERUMOXYTOL 510 MG: 510 INJECTION INTRAVENOUS at 03:11

## 2019-11-05 NOTE — PROGRESS NOTES
Subjective:       Patient ID: Joyce Peterson is a 59 y.o. female.    Chief Complaint: Annual Exam    Patient is a 59 y.o.female who presents today for annual    Cholesterol: due now  Vaccines: up to date  Eye exam: up to date; wears glasses  Mammogram: done today  Gyn exam: dr. Keshia alva  Colonoscopy: June 2017; repeat in ten years  DEXA: 2019; prolia for two years    Chronic sinusitis: unable to tolerate anthistamines; already on singulair; flonase makes her dry eyes worse  Review of Systems   Constitutional: Negative for appetite change, chills, diaphoresis and fever.   HENT: Negative for congestion, ear discharge, ear pain, postnasal drip, tinnitus, trouble swallowing and voice change.    Eyes: Negative for discharge, redness and itching.   Respiratory: Negative for cough, chest tightness, shortness of breath and wheezing.    Cardiovascular: Negative for chest pain, palpitations and leg swelling.   Gastrointestinal: Negative for abdominal pain, constipation, diarrhea, nausea and vomiting.   Endocrine: Negative for cold intolerance and heat intolerance.   Genitourinary: Negative for difficulty urinating, flank pain, hematuria and urgency.   Musculoskeletal: Negative for arthralgias, gait problem, myalgias and neck stiffness.   Skin: Negative for color change and rash.   Neurological: Negative for dizziness, seizures, syncope and headaches.   Hematological: Negative for adenopathy.   Psychiatric/Behavioral: Negative for agitation, behavioral problems, confusion and sleep disturbance.       Objective:      Physical Exam   Constitutional: She is oriented to person, place, and time. She appears well-developed and well-nourished. No distress.   HENT:   Head: Normocephalic and atraumatic.   Right Ear: External ear normal.   Left Ear: External ear normal.   Nose: Nose normal.   Mouth/Throat: Oropharynx is clear and moist. No oropharyngeal exudate.   Eyes: Pupils are equal, round, and reactive to light.  Conjunctivae and EOM are normal. Right eye exhibits no discharge. Left eye exhibits no discharge. No scleral icterus.   Neck: Neck supple. No JVD present. No tracheal deviation present. No thyromegaly present.   Cardiovascular: Normal rate, normal heart sounds and intact distal pulses. Exam reveals no gallop and no friction rub.   No murmur heard.  Pulmonary/Chest: Effort normal and breath sounds normal. No stridor. No respiratory distress. She has no wheezes. She has no rales. She exhibits no tenderness.   Abdominal: Soft. Bowel sounds are normal. She exhibits no distension. There is no tenderness. There is no rebound.   Musculoskeletal: She exhibits no edema or tenderness.   Lymphadenopathy:     She has no cervical adenopathy.   Neurological: She is alert and oriented to person, place, and time.   Skin: Skin is warm and dry. No rash noted. She is not diaphoretic. No erythema.   Psychiatric: She has a normal mood and affect. Her behavior is normal.   Nursing note and vitals reviewed.      Assessment and Plan:       1. Annual physical exam  - labs reviewed    2. Fatigue, unspecified type    - Vitamin B12; Future    3. Chronic sinusitis, unspecified location    - Ambulatory Referral to ENT    4. Dry cough    - Ambulatory Referral to ENT    5. Pulmonary nodule    - CT Chest Without Contrast; Future    6. Hoarseness    - Ambulatory Referral to ENT          No follow-ups on file.

## 2019-11-05 NOTE — PROGRESS NOTES
Subjective:      Chief Complaint: Osteoporosis    HPI: Joyce Peterson is a 59 y.o. female who is here for a follow-up evaluation for osteoporosis    Patient is new to me and was last seen by Dr. Forbes in 2018    With regards to osteoporosis:   Current medication: Prolia since 2017    Calcium intake?  Citracal BID  Vit D intake?  Citracal BID - Vit D in 2/2019 was 40  Weight bearing exercise?   no  Recent falls? no  Fall Precautions? no  Height loss (>2 inches)? no    Fracture history:  Has had at least 7 fractures in her feet (2nd-5th metatarsals). Two since being on Prolia.  Stress fracture of left femoral neck in 5/2019 - s/p CM nailing    Tobacco use ?  no  EtOH use?        Yes; occasional     Current diarrhea or h/o malabsorption? no  Chronic steroids? Yes - has been on chronic corticosteroids for the past 34 years for RA. On and off, but mostly on; current dose is 10 mg daily, but previously on higher doses  Anticoagulants? no  Proton Pump Inhibitors? yes  Antiseizure medications? no   Thyroid disease? Subclinical hyperthyroidism vs. TSH suppression from chronic steroids, but most recent labs are normal  Anemia? no  Kidney Stones? no  Hyperparathyroidism? no    Post-menopausal? Age?: 50s  ERT after menopause?: Yes, remains on E+P    Mom or dad with hip fracture?: No, mom with hip fx     Malignancy involving bone (active or history)? no  Prior radiation treatment? no  Unexplained elevation of alkaline phosphatase? no  Early tooth loss as a child? no  Dental work planned? no      DXA 2/2019:  EXAMINATION:  DEXA BONE DENSITY SPINE HIP    CLINICAL HISTORY:  Stress fracture, hip, unspecified, initial encounter for fracture    TECHNIQUE:  DXA scanning was performed over the left hip and lumbar spine.  Review of the images confirms satisfactory positioning and technique.    COMPARISON:  Two thousand seventeen    FINDINGS:  The L1 to L4 vertebral bone mineral density is equal to 1.051 g/cm squared with a T score of  -1.2, Z-score -0.2 comparison with previous T-score -2.1 and Z-score -0.9.  There has been significant improvement change relative to the prior study.    The left femoral neck bone mineral density is equal to 0.889 g/cm squared with a T score of -1.1, Z-score 0.0, compared with previous T-score -1.1 and Z-score 0.0.  There has been  no significant change relative to the prior study.      Impression       Osteopenia, lumbar spine improvement in the interim.     FRAX calculation:   BMI: 30.4  The ten year probability of fracture: Major osteoporotic 24%  Hip Fracture 2.4%    Reviewed previous DXAs. Last one was done at [facility] so couldn't compare directly.    With regards to the thyroid nodule:  Presents with nodule that was diagnosed by ultrasound    Preexisting thyroid disease?: questionable subclinical hyperthyroidism (TSH normal recently)    Compressiveymptoms: No    Risk Factors:  Radiation to head or neck for any treatment of cancer or exposure to radiation?: no  Personal history of colon or breast cancer?: no  Tobacco use?: no  Family history of thyroid cancer?: no    Symptoms of hyper or hypothyroidism:  No    Previous biopsy results:  Right nodule: Unsat 2012, then Benign - 2012    Last ultrasound:      Reviewed past medical, family, social history and updated as appropriate.    Review of Systems   Respiratory: Negative for shortness of breath.    Cardiovascular: Negative for chest pain.   Gastrointestinal: Negative for abdominal pain.     Objective:     Vitals:    11/05/19 1321   BP: 100/74   Pulse: 94   Resp: 16     BP Readings from Last 5 Encounters:   11/05/19 134/68   11/05/19 100/74   10/29/19 131/77   09/25/19 137/89   09/24/19 135/81       Physical Exam   Constitutional: She is oriented to person, place, and time. She appears well-developed and well-nourished. No distress.   HENT:   Head: Normocephalic and atraumatic.   Eyes: Conjunctivae are normal. Right eye exhibits no discharge. Left eye exhibits no  discharge.   Neck: No tracheal deviation present. No thyromegaly present.   Cardiovascular: Normal rate.   Pulmonary/Chest: Effort normal. No respiratory distress.   Musculoskeletal: She exhibits no edema.   No digital clubbing or extremity cyanosis  Ambulatory with cane  CAM boot R foot   Neurological: She is alert and oriented to person, place, and time. Coordination normal.   Skin:   Thin skin   Psychiatric: She has a normal mood and affect. Her behavior is normal.   Nursing note and vitals reviewed.      Wt Readings from Last 10 Encounters:   11/05/19 1321 71.6 kg (157 lb 11.8 oz)   10/31/19 1439 73.5 kg (162 lb)   10/29/19 1429 73.9 kg (162 lb 14.7 oz)   10/01/19 0955 73.9 kg (162 lb 14.7 oz)   09/25/19 1059 73.9 kg (163 lb)   09/24/19 1107 73.2 kg (161 lb 6 oz)   09/24/19 0737 74 kg (163 lb 1.6 oz)   09/20/19 1022 71.7 kg (158 lb)   09/11/19 1115 71.7 kg (158 lb)   09/09/19 1447 71.7 kg (158 lb)       Lab Results   Component Value Date    HGBA1C 6.0 02/06/2017     Lab Results   Component Value Date    CHOL 190 07/11/2019    HDL 85 (H) 07/11/2019    LDLCALC 75.8 07/11/2019    TRIG 146 07/11/2019    CHOLHDL 44.7 07/11/2019     Lab Results   Component Value Date     09/25/2019    K 4.3 09/25/2019     09/25/2019    CO2 24 09/25/2019     (H) 09/25/2019    BUN 14 09/25/2019    CREATININE 0.9 09/25/2019    CALCIUM 8.8 09/25/2019    PROT 6.2 09/25/2019    ALBUMIN 3.8 09/25/2019    BILITOT 0.4 09/25/2019    ALKPHOS 84 09/25/2019    AST 31 09/25/2019    ALT 28 09/25/2019    ANIONGAP 9 09/25/2019    ESTGFRAFRICA >60.0 09/25/2019    EGFRNONAA >60.0 09/25/2019    TSH 0.453 08/21/2019      No results found for: MICALBCREAT    Assessment/Plan:     Stress fracture of neck of left femur  See above.    Thyroid nodule  Check ultrasound in 2020 around time of next visit.    Unsure if mild TSH suppression is truly subclinical hyperthyroidism or just effect from steroids. Most recent TSHs have been  normal.    Steroid-induced osteoporosis  Patient has glucocorticoid induced osteoporosis; osteopenic range BMD with recurrent fractures while on therapy, suggesting very poor bone quality.    Patient is at very high risk for recurrent fractures given ongoing steroid use for many years.    I discussed treatment options. I think she needs an anabolic agent. Teriparatide has data for efficacy in [facility] and was shown to be better than bisphosphonates. The concern would be a possible temporary loss in BMD (and strength), which was seen in a transition study from denosumab to teriparatide. We may need to continue denosumab in addition to adding teriparatide. The combination has been studied in the DATA and DATA-HD trial and shown to be superior to either drug alone, although these were not [facility] patients.    Last prolia given 10/2019    Abaloparatide is likely as effective as teriparatide, but hasn't expressly been studied in GCIO. Discussed with patient and will advise her to reach out to insurance to see if there's a difference in cost.    No other cause for secondary osteoporosis has been identified on previous workup.    Continue Vit D + Calcium at current doses.    Repeat DXA in 1 year to assess efficacy of treatment.    Rheumatoid arthritis involving multiple sites  Increases risk for fracture      RTC in 1 year

## 2019-11-06 ENCOUNTER — HOSPITAL ENCOUNTER (OUTPATIENT)
Dept: RADIOLOGY | Facility: HOSPITAL | Age: 59
Discharge: HOME | End: 2019-11-06
Attending: PODIATRIST
Payer: MEDICARE

## 2019-11-06 DIAGNOSIS — Z87.312 HISTORY OF STRESS FRACTURE: ICD-10-CM

## 2019-11-06 DIAGNOSIS — M06.9 RHEUMATOID ARTHRITIS INVOLVING MULTIPLE SITES, UNSPECIFIED RHEUMATOID FACTOR PRESENCE: ICD-10-CM

## 2019-11-06 DIAGNOSIS — M84.474S METATARSAL FRACTURE, PATHOLOGIC, RIGHT, SEQUELA: ICD-10-CM

## 2019-11-06 PROBLEM — M84.352A: Chronic | Status: ACTIVE | Noted: 2019-05-20

## 2019-11-06 PROCEDURE — 73720 MRI LWR EXTREMITY W/O&W/DYE: CPT | Mod: TC,RT

## 2019-11-06 PROCEDURE — 25500020 PHARM REV CODE 255: Performed by: PODIATRIST

## 2019-11-06 PROCEDURE — 73720 MRI FOOT (MIDFOOT) RIGHT W W/O CONTRAST: ICD-10-PCS | Mod: 26,RT,, | Performed by: RADIOLOGY

## 2019-11-06 PROCEDURE — A9585 GADOBUTROL INJECTION: HCPCS | Performed by: PODIATRIST

## 2019-11-06 PROCEDURE — 73720 MRI LWR EXTREMITY W/O&W/DYE: CPT | Mod: 26,RT,, | Performed by: RADIOLOGY

## 2019-11-06 RX ORDER — GADOBUTROL 604.72 MG/ML
8 INJECTION INTRAVENOUS
Status: COMPLETED | OUTPATIENT
Start: 2019-11-06 | End: 2019-11-06

## 2019-11-06 RX ADMIN — GADOBUTROL 8 ML: 604.72 INJECTION INTRAVENOUS at 08:11

## 2019-11-06 NOTE — ASSESSMENT & PLAN NOTE
Patient has glucocorticoid induced osteoporosis; osteopenic range BMD with recurrent fractures while on therapy, suggesting very poor bone quality.    Patient is at very high risk for recurrent fractures given ongoing steroid use for many years.    I discussed treatment options. I think she needs an anabolic agent. Teriparatide has data for efficacy in [facility]IO and was shown to be better than bisphosphonates. The concern would be a possible temporary loss in BMD (and strength), which was seen in a transition study from denosumab to teriparatide. We may need to continue denosumab in addition to adding teriparatide. The combination has been studied in the DATA and DATA-HD trial and shown to be superior to either drug alone, although these were not [facility] patients.    Abaloparatide is likely as effective as teriparatide, but hasn't expressly been studied in [facility]IO. Discussed with patient and will advise her to reach out to insurance to see if there's a difference in cost.    No other cause for secondary osteoporosis has been identified on previous workup.    Continue Vit D + Calcium at current doses.

## 2019-11-07 ENCOUNTER — PATIENT MESSAGE (OUTPATIENT)
Dept: PODIATRY | Facility: CLINIC | Age: 59
End: 2019-11-07

## 2019-11-08 ENCOUNTER — PATIENT OUTREACH (OUTPATIENT)
Dept: ADMINISTRATIVE | Facility: HOSPITAL | Age: 59
End: 2019-11-08

## 2019-11-12 ENCOUNTER — TELEPHONE (OUTPATIENT)
Dept: PODIATRY | Facility: CLINIC | Age: 59
End: 2019-11-12

## 2019-11-12 NOTE — TELEPHONE ENCOUNTER
----- Message from Abdirashid Fermin sent at 11/12/2019 11:52 AM CST -----  Contact: Patient @ [phone]  Patient requesting a return call from Dr Ramos only about the office doing a fast appeal for the bone stimulator, pls call

## 2019-11-12 NOTE — TELEPHONE ENCOUNTER
Spoke to pt that would like an appeal done on her foot stimulator. Asked the pt to have the insurance company send the paper work to [phone]. Pt voiced understanding and asked if Dr. Ramos could give her a call to discuss her MRI.      Insurance phone number [phone].        11/13/2019  9:38AM  Expedited appeal done with [facility] for Bone Stimulator (CPT code  );  Reference number [id number]     Determination will take about 72hours.    (-Janeth Ramos DPM)

## 2019-11-13 NOTE — TELEPHONE ENCOUNTER
Faxed over pt's MRI and X-Ray reports as requested by Edson with [facility].    Madi SHIRLEY#[phone]  F# [phone]

## 2019-11-15 ENCOUNTER — TELEPHONE (OUTPATIENT)
Dept: PODIATRY | Facility: CLINIC | Age: 59
End: 2019-11-15

## 2019-11-15 NOTE — TELEPHONE ENCOUNTER
[facility] called bone stimulator was approved  Authorization # [id number].

## 2019-11-15 NOTE — TELEPHONE ENCOUNTER
----- Message from Leila Brambila sent at 11/15/2019 10:43 AM CST -----  Contact: Self            Name of Who is Calling: TONNY GOMEZ [[id number]]      What is the request in detail: Patient was calling to requesting if her cast was ready to be picked up today. Please contact to further discuss and advise.        Can the clinic reply by [facility]: no      What Number to [facility]: [phone]

## 2019-11-15 NOTE — TELEPHONE ENCOUNTER
Called and spoke with patient will come by to  rain at 3pm today.          ----- Message from Leila Brambila sent at 11/15/2019 10:43 AM CST -----  Contact: Self            Name of Who is Calling: TONNY GOMEZ [[id number]]      What is the request in detail: Patient was calling to requesting if her cast was ready to be picked up today. Please contact to further discuss and advise.        Can the clinic reply by [facility]: no      What [facility]: [phone]

## 2019-11-18 ENCOUNTER — TELEPHONE (OUTPATIENT)
Dept: OPHTHALMOLOGY | Facility: CLINIC | Age: 59
End: 2019-11-18

## 2019-11-18 ENCOUNTER — PATIENT MESSAGE (OUTPATIENT)
Dept: ENDOCRINOLOGY | Facility: CLINIC | Age: 59
End: 2019-11-18

## 2019-11-18 ENCOUNTER — PATIENT MESSAGE (OUTPATIENT)
Dept: SPORTS MEDICINE | Facility: CLINIC | Age: 59
End: 2019-11-18

## 2019-11-18 NOTE — TELEPHONE ENCOUNTER
----- Message from Liseth Velazquez sent at 11/18/2019 12:02 PM CST -----  Contact: Joyce  Pt stated she's having some trouble with her dry eyes and needed to come in to see Dr. Armas. Pt contact number is [phone].

## 2019-11-19 ENCOUNTER — HOSPITAL ENCOUNTER (OUTPATIENT)
Dept: RADIOLOGY | Facility: HOSPITAL | Age: 59
Discharge: HOME | End: 2019-11-19
Attending: INTERNAL MEDICINE
Payer: MEDICARE

## 2019-11-19 ENCOUNTER — TELEPHONE (OUTPATIENT)
Dept: SPORTS MEDICINE | Facility: CLINIC | Age: 59
End: 2019-11-19

## 2019-11-19 ENCOUNTER — OFFICE VISIT (OUTPATIENT)
Dept: INTERNAL MEDICINE | Facility: CLINIC | Age: 59
End: 2019-11-19
Payer: MEDICARE

## 2019-11-19 VITALS
BODY MASS INDEX: 30.14 KG/M2 | WEIGHT: 159.63 LBS | HEART RATE: 100 BPM | DIASTOLIC BLOOD PRESSURE: 74 MMHG | SYSTOLIC BLOOD PRESSURE: 118 MMHG | HEIGHT: 61 IN | RESPIRATION RATE: 20 BRPM | TEMPERATURE: 99 F

## 2019-11-19 DIAGNOSIS — R05.8 DRY COUGH: ICD-10-CM

## 2019-11-19 DIAGNOSIS — M25.552 LEFT HIP PAIN: ICD-10-CM

## 2019-11-19 DIAGNOSIS — R53.83 FATIGUE, UNSPECIFIED TYPE: ICD-10-CM

## 2019-11-19 DIAGNOSIS — M25.551 RIGHT HIP PAIN: Primary | ICD-10-CM

## 2019-11-19 DIAGNOSIS — Z00.00 ANNUAL PHYSICAL EXAM: Primary | ICD-10-CM

## 2019-11-19 DIAGNOSIS — R49.0 HOARSENESS: ICD-10-CM

## 2019-11-19 DIAGNOSIS — Z12.31 ENCOUNTER FOR SCREENING MAMMOGRAM FOR MALIGNANT NEOPLASM OF BREAST: ICD-10-CM

## 2019-11-19 DIAGNOSIS — J32.9 CHRONIC SINUSITIS, UNSPECIFIED LOCATION: ICD-10-CM

## 2019-11-19 DIAGNOSIS — R91.1 PULMONARY NODULE: ICD-10-CM

## 2019-11-19 PROCEDURE — 77067 SCR MAMMO BI INCL CAD: CPT | Mod: 26,,, | Performed by: RADIOLOGY

## 2019-11-19 PROCEDURE — 77063 BREAST TOMOSYNTHESIS BI: CPT | Mod: 26,,, | Performed by: RADIOLOGY

## 2019-11-19 PROCEDURE — 99396 PR PREVENTIVE VISIT,EST,40-64: ICD-10-PCS | Mod: S$GLB,,, | Performed by: INTERNAL MEDICINE

## 2019-11-19 PROCEDURE — 77067 MAMMO DIGITAL SCREENING BILAT WITH TOMOSYNTHESIS_CAD: ICD-10-PCS | Mod: 26,,, | Performed by: RADIOLOGY

## 2019-11-19 PROCEDURE — 99396 PREV VISIT EST AGE 40-64: CPT | Mod: S$GLB,,, | Performed by: INTERNAL MEDICINE

## 2019-11-19 PROCEDURE — 99999 PR PBB SHADOW E&M-EST. PATIENT-LVL III: CPT | Mod: PBBFAC,,, | Performed by: INTERNAL MEDICINE

## 2019-11-19 PROCEDURE — 77063 MAMMO DIGITAL SCREENING BILAT WITH TOMOSYNTHESIS_CAD: ICD-10-PCS | Mod: 26,,, | Performed by: RADIOLOGY

## 2019-11-19 PROCEDURE — 99999 PR PBB SHADOW E&M-EST. PATIENT-LVL III: ICD-10-PCS | Mod: PBBFAC,,, | Performed by: INTERNAL MEDICINE

## 2019-11-19 PROCEDURE — 77067 SCR MAMMO BI INCL CAD: CPT | Mod: TC

## 2019-11-21 ENCOUNTER — PATIENT MESSAGE (OUTPATIENT)
Dept: INTERNAL MEDICINE | Facility: CLINIC | Age: 59
End: 2019-11-21

## 2019-11-21 ENCOUNTER — TELEPHONE (OUTPATIENT)
Dept: INTERNAL MEDICINE | Facility: CLINIC | Age: 59
End: 2019-11-21

## 2019-11-21 DIAGNOSIS — R30.0 BURNING WITH URINATION: Primary | ICD-10-CM

## 2019-11-21 NOTE — TELEPHONE ENCOUNTER
----- Message from Bonnie Christensen sent at 11/21/2019  7:42 AM CST -----  Contact: Pt [phone]  Patient would like to get medical advice.  Symptoms (please be specific):  Back ache, pain during urination, bottom of stomach hurts  How long has patient had these symptoms:  2 days  Pharmacy name and phone # [id number] [place] - [address] [phone] (Phone) [phone] (Fax)   Would the patient rather a call back or a response via [facility]?:  Telep  Comments:  She requesting an order for a urinalysis

## 2019-11-21 NOTE — TELEPHONE ENCOUNTER
----- Message from Ne Carballo sent at 11/21/2019 11:23 AM CST -----  Contact: self [phone]  Patient stated she went in have urine sample done , she can't wait till Monday for antibiotic . Please call and advise, Thanks

## 2019-11-21 NOTE — TELEPHONE ENCOUNTER
Spoke to pt and informed that urine specimen can be collected but that Dr. Vogel waits until culture returns after 72 hours. She verbalized understanding.

## 2019-11-21 NOTE — TELEPHONE ENCOUNTER
Spoke to pt and informed that Dr. Vogel will message her over the weekend regarding urine results and abx sent if appropriate.

## 2019-11-22 ENCOUNTER — PATIENT MESSAGE (OUTPATIENT)
Dept: ENDOCRINOLOGY | Facility: CLINIC | Age: 59
End: 2019-11-22

## 2019-11-23 ENCOUNTER — PATIENT MESSAGE (OUTPATIENT)
Dept: INTERNAL MEDICINE | Facility: CLINIC | Age: 59
End: 2019-11-23

## 2019-11-23 RX ORDER — AMOXICILLIN AND CLAVULANATE POTASSIUM 875; 125 MG/1; MG/1
1 TABLET, FILM COATED ORAL 2 TIMES DAILY
Qty: 14 TABLET | Refills: 0 | Status: SHIPPED | OUTPATIENT
Start: 2019-11-23 | End: 2019-11-30

## 2019-11-25 ENCOUNTER — PATIENT MESSAGE (OUTPATIENT)
Dept: INTERNAL MEDICINE | Facility: CLINIC | Age: 59
End: 2019-11-25

## 2019-11-25 ENCOUNTER — HOSPITAL ENCOUNTER (OUTPATIENT)
Dept: RADIOLOGY | Facility: HOSPITAL | Age: 59
Discharge: HOME | End: 2019-11-25
Attending: INTERNAL MEDICINE
Payer: MEDICARE

## 2019-11-25 DIAGNOSIS — R91.1 PULMONARY NODULE: ICD-10-CM

## 2019-11-25 PROCEDURE — 71250 CT CHEST WITHOUT CONTRAST: ICD-10-PCS | Mod: 26,,, | Performed by: RADIOLOGY

## 2019-11-25 PROCEDURE — 71250 CT THORAX DX C-: CPT | Mod: TC

## 2019-11-25 PROCEDURE — 71250 CT THORAX DX C-: CPT | Mod: 26,,, | Performed by: RADIOLOGY

## 2019-11-26 ENCOUNTER — PATIENT OUTREACH (OUTPATIENT)
Dept: ADMINISTRATIVE | Facility: OTHER | Age: 59
End: 2019-11-26

## 2019-11-26 ENCOUNTER — TELEPHONE (OUTPATIENT)
Dept: PHARMACY | Facility: CLINIC | Age: 59
End: 2019-11-26

## 2019-11-27 ENCOUNTER — TELEPHONE (OUTPATIENT)
Dept: RHEUMATOLOGY | Facility: CLINIC | Age: 59
End: 2019-11-27

## 2019-11-27 NOTE — PROGRESS NOTES
Subjective:       Patient ID: Joyce Peterson is a 59 y.o. female.    Chief Complaint: Mass (abdomen) and URI    Patient is a 59 y.o.female who presents today for abdominal mass. She found a lump on her middle upper abdomen. Area is non tender to touch.    URI: started one week ago. Family was in town and she had to bring them to the ER. She thinks she picked it up there. She is taking doxy for her eyes currently.     Review of Systems   Constitutional: Negative for appetite change, chills, diaphoresis and fever.   HENT: Positive for congestion. Negative for ear discharge, ear pain, postnasal drip, tinnitus, trouble swallowing and voice change.    Eyes: Negative for discharge, redness and itching.   Respiratory: Positive for cough. Negative for chest tightness, shortness of breath and wheezing.    Cardiovascular: Negative for chest pain, palpitations and leg swelling.   Gastrointestinal: Negative for abdominal pain, constipation, diarrhea, nausea and vomiting.   Endocrine: Negative for cold intolerance and heat intolerance.   Genitourinary: Negative for difficulty urinating, flank pain, hematuria and urgency.   Musculoskeletal: Negative for arthralgias, gait problem, myalgias and neck stiffness.   Skin: Negative for color change and rash.   Neurological: Negative for dizziness, seizures, syncope and headaches.   Hematological: Negative for adenopathy.   Psychiatric/Behavioral: Negative for agitation, behavioral problems, confusion and sleep disturbance.       Objective:      Physical Exam   Constitutional: She is oriented to person, place, and time. She appears well-developed and well-nourished. No distress.   HENT:   Head: Normocephalic and atraumatic.   Right Ear: External ear normal. There is swelling.   Left Ear: External ear normal. There is swelling.   Nose: Nose normal.   Mouth/Throat: Oropharynx is clear and moist. No oropharyngeal exudate.   Eyes: Pupils are equal, round, and reactive to light.  Conjunctivae and EOM are normal. Right eye exhibits no discharge. Left eye exhibits no discharge. No scleral icterus.   Neck: Neck supple. No JVD present. No tracheal deviation present. No thyromegaly present.   Cardiovascular: Normal rate, normal heart sounds and intact distal pulses. Exam reveals no gallop and no friction rub.   No murmur heard.  Pulmonary/Chest: Effort normal. No stridor. No respiratory distress. She has wheezes. She has no rales. She exhibits no tenderness.   Abdominal: Soft. Bowel sounds are normal. She exhibits no distension. There is no tenderness. There is no rebound. A hernia is present. Hernia confirmed positive in the ventral area.   Musculoskeletal: She exhibits no edema or tenderness.   Lymphadenopathy:     She has no cervical adenopathy.   Neurological: She is alert and oriented to person, place, and time.   Skin: Skin is warm and dry. No rash noted. She is not diaphoretic. No erythema.   Psychiatric: She has a normal mood and affect. Her behavior is normal.   Nursing note and vitals reviewed.      Assessment and Plan:       1. Ventral hernia without obstruction or gangrene    - US Abdomen Limited_Hernia; Future    2. Acute bacterial sinusitis    - doxycycline (VIBRAMYCIN) 100 MG Cap; Take 1 capsule (100 mg total) by mouth 2 (two) times daily. for 7 days  Dispense: 14 capsule; Refill: 0  - triamcinolone acetonide injection 40 mg          No follow-ups on file.

## 2019-11-27 NOTE — TELEPHONE ENCOUNTER
Please find out how much prednisone she is taking. Refill requested for prednisone 1mg daily??? MEGA

## 2019-11-29 ENCOUNTER — OFFICE VISIT (OUTPATIENT)
Dept: OPHTHALMOLOGY | Facility: CLINIC | Age: 59
End: 2019-11-29
Payer: MEDICARE

## 2019-11-29 DIAGNOSIS — H00.029 MEIBOMIAN BLEPHARITIS, UNSPECIFIED LATERALITY: ICD-10-CM

## 2019-11-29 DIAGNOSIS — H04.123 BILATERAL DRY EYES: Primary | ICD-10-CM

## 2019-11-29 DIAGNOSIS — H16.223 KERATOCONJUNCTIVITIS SICCA NOT SPECIFIED AS SJOGREN'S, BILATERAL: ICD-10-CM

## 2019-11-29 PROCEDURE — 99999 PR PBB SHADOW E&M-EST. PATIENT-LVL III: CPT | Mod: PBBFAC,,, | Performed by: OPHTHALMOLOGY

## 2019-11-29 PROCEDURE — 99999 PR PBB SHADOW E&M-EST. PATIENT-LVL III: ICD-10-PCS | Mod: PBBFAC,,, | Performed by: OPHTHALMOLOGY

## 2019-11-29 PROCEDURE — 92014 PR EYE EXAM, EST PATIENT,COMPREHESV: ICD-10-PCS | Mod: S$GLB,,, | Performed by: OPHTHALMOLOGY

## 2019-11-29 PROCEDURE — 92014 COMPRE OPH EXAM EST PT 1/>: CPT | Mod: S$GLB,,, | Performed by: OPHTHALMOLOGY

## 2019-11-29 RX ORDER — DOXYCYCLINE 50 MG/1
50 CAPSULE ORAL 2 TIMES DAILY
Qty: 60 CAPSULE | Refills: 6 | Status: SHIPPED | OUTPATIENT
Start: 2019-11-29 | End: 2020-08-13

## 2019-11-29 RX ORDER — DOXYCYCLINE 50 MG/1
50 CAPSULE ORAL 2 TIMES DAILY
Qty: 60 CAPSULE | Refills: 6 | Status: SHIPPED | OUTPATIENT
Start: 2019-11-29 | End: 2019-11-29 | Stop reason: SDUPTHER

## 2019-11-29 RX ORDER — PREDNISONE 1 MG/1
1 TABLET ORAL DAILY
Qty: 90 TABLET | Refills: 1 | Status: SHIPPED | OUTPATIENT
Start: 2019-11-29 | End: 2019-12-16

## 2019-11-29 RX ORDER — PREDNISONE 1 MG/1
TABLET ORAL
Qty: 30 TABLET | Refills: 1 | Status: SHIPPED | OUTPATIENT
Start: 2019-11-29 | End: 2019-11-29

## 2019-11-29 RX ORDER — PREDNISONE 5 MG/1
5 TABLET ORAL DAILY
Qty: 90 TABLET | Refills: 1 | Status: SHIPPED | OUTPATIENT
Start: 2019-11-29 | End: 2020-02-27

## 2019-11-29 NOTE — PROGRESS NOTES
Called patient and asked her about her prednisone  She says she needs 5 mg and 1 mg prescriptions since she is tapering the dose

## 2019-12-01 NOTE — PROGRESS NOTES
HPI     Patrice E Adrien, OD  / [facility]     Kertaoconjunctivitis sicca not specified as Sjogren's syndrome   S/p temp plugs RLL, YOANNA - did not do well with plugs (LLL stenosed close)     Serum Gtts QID OU   Restasis OU BID   Refresh all day OU     Pt states she feels like thick oil OU. Pulls lid down and wipe to get oil   out. Pt expresses extreme unhappiness with eyes. Blurry VA. Crusty along   eyelashes, OS>OD. Pt states when she takes ax, her eyes feel better. Is   aware she has dry eyes, wants to know if there is anything you can do to   alleviate symptoms. Sometimes has eye pain at night, not feeling heavy.   Mainly trying read, strenuous. No other ocular complaints.     Last edited by Jamila Armas MD on 11/29/2019 12:10 PM. (History)            Assessment /Plan     For exam results, see Encounter Report.    Bilateral dry eyes    Keratoconjunctivitis sicca not specified as Sjogren's, bilateral    Meibomian blepharitis, unspecified laterality    Other orders  -     Discontinue: doxycycline (MONODOX) 50 MG Cap; Take 1 capsule (50 mg total) by mouth 2 (two) times daily.  Dispense: 60 capsule; Refill: 6  -     Discontinue: doxycycline (MONODOX) 50 MG Cap; Take 1 capsule (50 mg total) by mouth 2 (two) times daily.  Dispense: 60 capsule; Refill: 6  -     doxycycline (MONODOX) 50 MG Cap; Take 1 capsule (50 mg total) by mouth 2 (two) times daily.  Dispense: 60 capsule; Refill: 6  -     lifitegrast (XIIDRA) 5 % Dpet; Place 1 drop into both eyes 2 (two) times daily.  Dispense: 60 each; Refill: 12        Keratitis Sicca with underlying RA, most likely consistent with Sjogren's syndrome  - Decreased Schirmer with anesthesia (on initial visit); pt also notes dry mouth  - Not interested in punctal plugs, prior failure  - Symptoms and pain much improved after starting ASD (formerly through Shanthi, now Dr. Hardy)   - Continue ASD QID  - Continue Restasis BID -   - add oral doxy (SE discussed, pt feels eyelids  contributory) and trial of adding xiidra as well    Also discussed scleral CL fit with Dr Feliciano if NI with above.

## 2019-12-02 ENCOUNTER — PATIENT MESSAGE (OUTPATIENT)
Dept: ENDOCRINOLOGY | Facility: CLINIC | Age: 59
End: 2019-12-02

## 2019-12-02 DIAGNOSIS — N95.2 VAGINAL ATROPHY: ICD-10-CM

## 2019-12-02 DIAGNOSIS — N39.0 RECURRENT UTI: ICD-10-CM

## 2019-12-02 RX ORDER — CONJUGATED ESTROGENS 0.62 MG/G
CREAM VAGINAL
Qty: 30 G | Refills: 3 | Status: SHIPPED | OUTPATIENT
Start: 2019-12-02 | End: 2020-05-18

## 2019-12-04 ENCOUNTER — TELEPHONE (OUTPATIENT)
Dept: ENDOCRINOLOGY | Facility: CLINIC | Age: 59
End: 2019-12-04

## 2019-12-04 DIAGNOSIS — M81.0 POST-MENOPAUSAL OSTEOPOROSIS: Primary | ICD-10-CM

## 2019-12-04 RX ORDER — NAPROXEN 500 MG/1
TABLET ORAL
Qty: 180 TABLET | Refills: 0 | Status: SHIPPED | OUTPATIENT
Start: 2019-12-04 | End: 2020-03-16 | Stop reason: SDUPTHER

## 2019-12-05 ENCOUNTER — CLINICAL SUPPORT (OUTPATIENT)
Dept: REHABILITATION | Facility: HOSPITAL | Age: 59
End: 2019-12-05
Payer: MEDICARE

## 2019-12-05 DIAGNOSIS — R26.2 DIFFICULTY WALKING: ICD-10-CM

## 2019-12-05 PROCEDURE — G8979 MOBILITY GOAL STATUS: HCPCS | Mod: CK

## 2019-12-05 PROCEDURE — 97110 THERAPEUTIC EXERCISES: CPT

## 2019-12-05 PROCEDURE — G8978 MOBILITY CURRENT STATUS: HCPCS | Mod: CK

## 2019-12-05 PROCEDURE — 97161 PT EVAL LOW COMPLEX 20 MIN: CPT

## 2019-12-05 NOTE — PLAN OF CARE
[facility]  Physical Therapy Initial Evaluation    Name: Joyce Peterson  Clinic Number: [id number]    Therapy Diagnosis:   Encounter Diagnosis   Name Primary?    Difficulty walking      Physician: Jett Julien III, *    Physician Orders: PT Eval and Treat aquatic therapy  Medical Diagnosis from Referral:   M25.551 (ICD-10-CM) - Right hip pain   M25.552 (ICD-10-CM) - Left hip pain       Evaluation Date: 12/5/2019  Authorization Period Expiration: 11/18/2020  Plan of Care Expiration: 2/27/2020  Visit # / Visits authorized: 1/ 1  Total visits: 1    Time In: 1245  Time Out: 1328  Total Billable Time: 43 minutes(eval, ex)    Precautions: osteoporosis with pathological fx L femur, asthma, RA, Sjogrens, R foot metatarsal fx 3-5 patholigical fx  Procedures: IMN L femur 5/21/2019, L  triceps tendon repair 10/17/2018    Subjective   Date of onset: this year had IMN femur 2/2 pathological fractures  History of current condition - Joyce reports: Pain in B hips & back with walking; increased stiffness in back in the morning; sleep on back & side with pillows between knees & uses adjustable bed to support knees; drove self to therapy; increased pain with sitting, standing for prolonged periods; sons has heated pool that she has access ; patient reports she will be starting a  New bone medication soon       Past Medical History:   Diagnosis Date    Acid reflux     Allergy     Anemia     Asthma     Coronary artery disease     Degenerative disc disease     Dry eyes     Dry mouth     Gastroparesis     Hyperlipidemia     Lateral meniscus derangement 4/6/2016    Lobular carcinoma in situ     Osteoarthritis     Osteoporosis     Rheumatoid arthritis(714.0)     Umbilical hernia 8/13/2015     Joyce Peterson  has a past surgical history that includes Cholecystectomy (2004); Tonsillectomy; Tubal ligation (2003); uterine ablation (2003); Upper gastrointestinal endoscopy; Colonoscopy;  "Carpal tunnel release (Right, 05/2017); Colonoscopy (N/A, 6/29/2017); Repair of triceps tendon (Left, 10/17/2018); Intramedullary rodding of femur (Left, 5/21/2019); and Breast biopsy (Left, 01/29/2002).    Joyce has a current medication list which includes the following prescription(s): albuterol, albuterol, amoxicillin, ampicillin, aspirin, azelastine, benzonatate, budesonide-formoterol 160-4.5 mcg, calcium citrate-vitamin d3 315-200 mg, diazepam, diclofenac sodium, doxycycline, fluconazole, fluconazole, fluticasone propionate, guaifenesin, INV PROPULSID 10 MG, leflunomide, lifitegrast, montelukast, naproxen, omeprazole, omeprazole-sodium bicarbonate, potassium, prednisone, prednisone, premarin, premarin, progesterone, promethazine-dextromethorphan, restasis, rizatriptan, romosozumab-aqqg, rosuvastatin, sarilumab, sucralfate, syringe with needle, tramadol, and valacyclovir, and the following Facility-Administered Medications: sodium chloride 0.9%.    Review of patient's allergies indicates:   Allergen Reactions    Actemra [tocilizumab] Other (See Comments)     Severe dizziness    Codeine Nausea And Vomiting    Gold au 198 Hives and Rash    Hydroxychloroquine Other (See Comments)     Can't remember the reaction      Iodinated contrast media Other (See Comments)     Other reaction(s): BURNING ALL OVER    Iodine Other (See Comments)     Other reaction(s): BURNING ALL OVER - Iodine dye - Not topical    Sulfa (sulfonamide antibiotics) Other (See Comments)     Can't remember the reaction    Zofran [ondansetron hcl (pf)] Nausea And Vomiting     Pt reports that last time she received zofran she started vomiting again    Methotrexate analogues Nausea Only    Pneumovax 23 [pneumococcal 23-argenis ps vaccine] Other (See Comments)     "sick"        Imaging, MRI R hip 9/13/2019:1. Tendinosis of the right gluteus medius and gluteus minimus.  2. Mild right hip osteoarthritis with labral fraying.3. No evidence of right hip " stress fracture.                   Prior Therapy: land based PT @ [facility]  Social History:  lives with their spouse single story home with 5 steps with single railing  Occupation: retired  Prior Level of Function: I with mobility  Current Level of Function: I with mobility using cane but distance limited 2/2 pain    Pain:  Current 6/10, worst 10/10, best 4/10   Location: bilateral hips & low back  Description: Aching, Throbbing, Grabbing, Tingling and Deep  Aggravating Factors: Standing, Walking, Morning and Getting out of bed/chair  Easing Factors: heating pad and rest    Pts goals: get stronger & walk better    Objective     TUG: 15 sec  5TSTS: 11 sec  Observation: walks into clinic      Posture: upright trunk; R shoulder elevated 2/2 cane too high      Gait: decreased L hip flexion with swing phase of gait    Range of Motion: AROM  WFL BLEs        Strength:  Hip Left Right   Flexion 4-/5 5/5   Abduction 4-/5 4+/5   Adduction 4-/5 4+/5   Extension 4-/5 4+/5   External Rot 4/5 4/5   Internal Rot 4/5 4/5     Knee Left Right   Extension 5/5 5/5   Flexion 5/5 5/5     Ankle Left Right   Dorsiflexion 5/5 5/5   Plantarflexion 5/5 5/5   Inversion 5/5 5/5   Eversion 5/5 5/5       Joint Mobility: hypomobile lumbar spine    Palpation: TTP lumbar paraspinals, B SI, B piriformis    Sensation: intact light touch all extremities    Flexibility:    Ryan negative  Elias negative  Hamstring 90/90: 0 degrees from neutral L; 10 degrees from neutral R    CMS Impairment/Limitation/Restriction for FOTO hip Survey    Therapist reviewed FOTO scores for Joyce Peterson on 12/5/2019.   FOTO documents entered into [employer] - see Media section.    Limitation Score: 58%  Category: Mobility    Current : CK = at least 40% but < 60% impaired, limited or restricted  Goal: CK = at least 40% but < 60% impaired, limited or restricted           TREATMENT   Treatment Time In: 1318  Treatment Time Out: 1328  Total Treatment time  separate from Evaluation: 10 minutes    Joyce received therapeutic exercises to develop strength and flexibility for 10 minutes including: Lower truncal roation x 10 reps, dead bug for 10 reps each LE    Adjusted cane to proper height    Home Exercises and Patient Education Provided    Education provided:   Role of aquatic therapy  Anti inflammatory food list      Written Home Exercises Provided: yes.  Exercises were reviewed and Joyce was able to demonstrate them prior to the end of the session.  Joyce demonstrated good  understanding of the education provided.     See EMR under Patient Instructions for exercises provided 12/5/2019(lower truncal rotation & dead bug)    Assessment   Joyce is a 59 y.o. female referred to outpatient Physical Therapy with a medical diagnosis of B hip pain. Pt presents with decreased endurance, pain, & weakness which impacts the patient's ability to complete functional tasks & activities safely & timely.    Pt prognosis is Fair.   Pt will benefit from skilled aquatic outpatient Physical Therapy to address the deficits stated above and in the chart below, provide pt/family education, and to maximize pt's level of independence.     Plan of care discussed with patient: Yes  Pt's spiritual, cultural and educational needs considered and patient is agreeable to the plan of care and goals as stated below:     Anticipated Barriers for therapy: none    Medical Necessity is demonstrated by the following  History  Co-morbidities and personal factors that may impact the plan of care Co-morbidities:   difficulty sleeping, RA, prior femur surgery    Personal Factors:   no deficits     low   Examination  Body Structures and Functions, activity limitations and participation restrictions that may impact the plan of care Body Regions:   lower extremities    Body Systems:    strength  transitions    Participation Restrictions:   none    Activity limitations:   Learning and applying knowledge  no  deficits    General Tasks and Commands  no deficits    Communication  no deficits    Mobility  lifting and carrying objects  walking  moving around using equipment (WC)    Self care  no deficits    Domestic Life  no deficits    Interactions/Relationships  no deficits    Life Areas  no deficits    Community and Social Life  no deficits         low   Clinical Presentation stable and uncomplicated low   Decision Making/ Complexity Score: low     Goals:  Short Term Goals: 6 weeks   1. Patient will be independent in HEP & progressions  2. Patient will achieve TUG score of 13 sec or less to demonstrate improved mobility    Long Term Goals: 12 weeks   1. Patient  will have FOTO limitation score of 46% to demonstrate improved function & decreased pain  2. Patient will increase L hip MMT to 4+/5 to demonstrate improved strength  3. Patient will be able to ascend/descend 5 steps with use of single railing & pain level 3/10 or less  4. Patient will be able to walk 100ft without AD & without gait deviations    Plan   Plan of care Certification: 12/5/2019 to 2/27/2020.    Outpatient aquatic Physical Therapy 1-2 times weekly for  12 weeks to include the following interventions: aquatic therapy, manual therapy, patient education, therapeutic exercise, therapeutic activity  Patient may be seen by PTA as part of the rehabilitation team      Cathleen Acosta, PT

## 2019-12-06 ENCOUNTER — TELEPHONE (OUTPATIENT)
Dept: PHARMACY | Facility: CLINIC | Age: 59
End: 2019-12-06

## 2019-12-06 NOTE — TELEPHONE ENCOUNTER
LVM for callback to inform patient that [facility] received prescription for Evenity and benefits investigation is required.  [facility] will be back in touch once insurance determination is received.     Complex Repair And Ftsg Text: The defect edges were debeveled with a #15 scalpel blade.  The primary defect was closed partially with a complex linear closure.  Given the location of the defect, shape of the defect and the proximity to free margins a full thickness skin graft was deemed most appropriate to repair the remaining defect.  The graft was trimmed to fit the size of the remaining defect.  The graft was then placed in the primary defect, oriented appropriately, and sutured into place.

## 2019-12-10 ENCOUNTER — PATIENT MESSAGE (OUTPATIENT)
Dept: PODIATRY | Facility: CLINIC | Age: 59
End: 2019-12-10

## 2019-12-10 DIAGNOSIS — M84.474S METATARSAL FRACTURE, PATHOLOGIC, RIGHT, SEQUELA: Primary | ICD-10-CM

## 2019-12-11 ENCOUNTER — OFFICE VISIT (OUTPATIENT)
Dept: INTERNAL MEDICINE | Facility: CLINIC | Age: 59
End: 2019-12-11
Payer: MEDICARE

## 2019-12-11 VITALS
HEART RATE: 80 BPM | RESPIRATION RATE: 16 BRPM | TEMPERATURE: 98 F | HEIGHT: 61 IN | SYSTOLIC BLOOD PRESSURE: 116 MMHG | DIASTOLIC BLOOD PRESSURE: 70 MMHG | BODY MASS INDEX: 30.09 KG/M2 | WEIGHT: 159.38 LBS

## 2019-12-11 DIAGNOSIS — B96.89 ACUTE BACTERIAL SINUSITIS: ICD-10-CM

## 2019-12-11 DIAGNOSIS — J01.90 ACUTE BACTERIAL SINUSITIS: ICD-10-CM

## 2019-12-11 DIAGNOSIS — K43.9 VENTRAL HERNIA WITHOUT OBSTRUCTION OR GANGRENE: Primary | ICD-10-CM

## 2019-12-11 PROCEDURE — 96372 THER/PROPH/DIAG INJ SC/IM: CPT | Mod: S$GLB,,, | Performed by: INTERNAL MEDICINE

## 2019-12-11 PROCEDURE — 99214 OFFICE O/P EST MOD 30 MIN: CPT | Mod: 25,S$GLB,, | Performed by: INTERNAL MEDICINE

## 2019-12-11 PROCEDURE — 96372 PR INJECTION,THERAP/PROPH/DIAG2ST, IM OR SUBCUT: ICD-10-PCS | Mod: S$GLB,,, | Performed by: INTERNAL MEDICINE

## 2019-12-11 PROCEDURE — 99214 PR OFFICE/OUTPT VISIT, EST, LEVL IV, 30-39 MIN: ICD-10-PCS | Mod: 25,S$GLB,, | Performed by: INTERNAL MEDICINE

## 2019-12-11 PROCEDURE — 3008F BODY MASS INDEX DOCD: CPT | Mod: CPTII,S$GLB,, | Performed by: INTERNAL MEDICINE

## 2019-12-11 PROCEDURE — 99999 PR PBB SHADOW E&M-EST. PATIENT-LVL III: CPT | Mod: PBBFAC,,, | Performed by: INTERNAL MEDICINE

## 2019-12-11 PROCEDURE — 3008F PR BODY MASS INDEX (BMI) DOCUMENTED: ICD-10-PCS | Mod: CPTII,S$GLB,, | Performed by: INTERNAL MEDICINE

## 2019-12-11 PROCEDURE — 99999 PR PBB SHADOW E&M-EST. PATIENT-LVL III: ICD-10-PCS | Mod: PBBFAC,,, | Performed by: INTERNAL MEDICINE

## 2019-12-11 RX ORDER — DOXYCYCLINE 100 MG/1
100 CAPSULE ORAL 2 TIMES DAILY
Qty: 14 CAPSULE | Refills: 0 | Status: SHIPPED | OUTPATIENT
Start: 2019-12-11 | End: 2019-12-18

## 2019-12-11 RX ORDER — TRIAMCINOLONE ACETONIDE 40 MG/ML
40 INJECTION, SUSPENSION INTRA-ARTICULAR; INTRAMUSCULAR
Status: COMPLETED | OUTPATIENT
Start: 2019-12-11 | End: 2019-12-11

## 2019-12-11 RX ADMIN — TRIAMCINOLONE ACETONIDE 40 MG: 40 INJECTION, SUSPENSION INTRA-ARTICULAR; INTRAMUSCULAR at 08:12

## 2019-12-12 ENCOUNTER — PATIENT OUTREACH (OUTPATIENT)
Dept: ADMINISTRATIVE | Facility: OTHER | Age: 59
End: 2019-12-12

## 2019-12-16 ENCOUNTER — PATIENT MESSAGE (OUTPATIENT)
Dept: ENDOCRINOLOGY | Facility: CLINIC | Age: 59
End: 2019-12-16

## 2019-12-16 ENCOUNTER — OFFICE VISIT (OUTPATIENT)
Dept: OTOLARYNGOLOGY | Facility: CLINIC | Age: 59
End: 2019-12-16
Payer: MEDICARE

## 2019-12-16 VITALS
BODY MASS INDEX: 29.65 KG/M2 | DIASTOLIC BLOOD PRESSURE: 86 MMHG | HEART RATE: 110 BPM | WEIGHT: 157.06 LBS | SYSTOLIC BLOOD PRESSURE: 126 MMHG | HEIGHT: 61 IN

## 2019-12-16 DIAGNOSIS — J34.3 HYPERTROPHY OF INFERIOR NASAL TURBINATE: ICD-10-CM

## 2019-12-16 DIAGNOSIS — J34.2 NASAL SEPTAL DEVIATION: ICD-10-CM

## 2019-12-16 DIAGNOSIS — J32.8 OTHER CHRONIC SINUSITIS: Primary | ICD-10-CM

## 2019-12-16 PROCEDURE — 99203 PR OFFICE/OUTPT VISIT, NEW, LEVL III, 30-44 MIN: ICD-10-PCS | Mod: 25,S$GLB,, | Performed by: OTOLARYNGOLOGY

## 2019-12-16 PROCEDURE — 3008F BODY MASS INDEX DOCD: CPT | Mod: CPTII,S$GLB,, | Performed by: OTOLARYNGOLOGY

## 2019-12-16 PROCEDURE — 99999 PR PBB SHADOW E&M-EST. PATIENT-LVL III: CPT | Mod: PBBFAC,,, | Performed by: OTOLARYNGOLOGY

## 2019-12-16 PROCEDURE — 31231 NASAL ENDOSCOPY DX: CPT | Mod: S$GLB,,, | Performed by: OTOLARYNGOLOGY

## 2019-12-16 PROCEDURE — 3008F PR BODY MASS INDEX (BMI) DOCUMENTED: ICD-10-PCS | Mod: CPTII,S$GLB,, | Performed by: OTOLARYNGOLOGY

## 2019-12-16 PROCEDURE — 99203 OFFICE O/P NEW LOW 30 MIN: CPT | Mod: 25,S$GLB,, | Performed by: OTOLARYNGOLOGY

## 2019-12-16 PROCEDURE — 31231 PR NASAL ENDOSCOPY, DX: ICD-10-PCS | Mod: S$GLB,,, | Performed by: OTOLARYNGOLOGY

## 2019-12-16 PROCEDURE — 99999 PR PBB SHADOW E&M-EST. PATIENT-LVL III: ICD-10-PCS | Mod: PBBFAC,,, | Performed by: OTOLARYNGOLOGY

## 2019-12-16 RX ORDER — AMOXICILLIN AND CLAVULANATE POTASSIUM 875; 125 MG/1; MG/1
1 TABLET, FILM COATED ORAL 2 TIMES DAILY
Qty: 21 TABLET | Refills: 0 | Status: CANCELLED | OUTPATIENT
Start: 2019-12-16 | End: 2019-12-30

## 2019-12-16 RX ORDER — AMOXICILLIN AND CLAVULANATE POTASSIUM 875; 125 MG/1; MG/1
1 TABLET, FILM COATED ORAL 2 TIMES DAILY
Qty: 20 TABLET | Refills: 0 | Status: CANCELLED | OUTPATIENT
Start: 2019-12-16 | End: 2019-12-26

## 2019-12-16 RX ORDER — METHYLPREDNISOLONE 4 MG/1
TABLET ORAL
Qty: 1 PACKAGE | Refills: 0 | Status: SHIPPED | OUTPATIENT
Start: 2019-12-16 | End: 2020-05-25

## 2019-12-16 RX ORDER — LEVOFLOXACIN 500 MG/1
500 TABLET, FILM COATED ORAL DAILY
Qty: 14 TABLET | Refills: 0 | Status: SHIPPED | OUTPATIENT
Start: 2019-12-16 | End: 2019-12-30

## 2019-12-16 NOTE — LETTER
December 16, 2019      Becca Peters,   [address] [place] [address]           [facility] Otorhin[facility]  [address]  [facility] [address]  Phone: [phone]  Fax: [phone]          Patient: Joyce Peterson   MR Number: [id number]   YOB: 1960   Date of Visit: 12/16/2019       Dear Dr. Becca Peters:    Thank you for referring Joyce Peterson to me for evaluation. Attached you will find relevant portions of my assessment and plan of care.    If you have questions, please do not hesitate to call me. I look forward to following Joyce Peterson along with you.    Sincerely,    Rachelle Mcguire MD    Enclosure  CC:  No Recipients    If you would like to receive this communication electronically, please contact [email or web address] or [phone] to request more information on [email or web address] Link access.    For providers and/or their staff who would like to refer a patient to [facility], please contact us through our one-stop-shop provider referral line, [facility], at [phone].    If you feel you have received this communication in error or would no longer like to receive these types of communications, please e-mail [email or web address]

## 2019-12-16 NOTE — TELEPHONE ENCOUNTER
DOCUMENTATION ONLY:  Prior authorization for Evenity 210 mg/2.34 mL approved from 01/01/2020 to 12/31/2020    Co-pay: $70.00    Patient Assistance IS required. Sending to the financial assistance team to investigate assistance options. Rafaela DEL ANGEL

## 2019-12-16 NOTE — PROGRESS NOTES
Chief Complaint   Patient presents with    Nasal Congestion     started two weeks ago, midface pressure    Cough    Allergies   .    HPI:     Joyce Peterson is a 59 y.o. female who is referred by Dr. Becca Peters MD for evaluation of 2 week history of increased  nasal congestion and postnasal drip. She states that she most recently was treated with doxycycline 100mg PO BID for 7 days without relief. She states that she has had a lifelong history of nasal congestion and allergies.  She states that she had positive allergy testing in the past. She has had immunotherapy in the past.  She notes that she will have 3-4 exacerbations of her nasal symptoms per year that takes 2-3 months to resolve. She also has a history of asthma and finds that her asthma is worse when she is having sinus issues.   She describes difficulty breathing at night. There is bilateral nasal obstruction. She does use sinus rinses or nasal sprays. She has been on  singulair, alavert, albuterol, symbicort, astelin, flonase.  She admits to midface pain and pressure.  She admits to rhinorrhea and postnasal drip. There is not maxillary tooth pain. She  admits to headaches.  She has not had sinus or nasal surgery. There is no history of sinonasal trauma.      Past Medical History:   Diagnosis Date    Acid reflux     Allergy     Anemia     Asthma     Coronary artery disease     Degenerative disc disease     Dry eyes     Dry mouth     Gastroparesis     Hyperlipidemia     Lateral meniscus derangement 4/6/2016    Lobular carcinoma in situ     Osteoarthritis     Osteoporosis     Rheumatoid arthritis(714.0)     Umbilical hernia 8/13/2015     Social History     Socioeconomic History    Marital status:      Spouse name: Not on file    Number of children: Not on file    Years of education: Not on file    Highest education level: Not on file   Occupational History    Not on file   Social Needs    Financial resource  strain: Not on file    Food insecurity:     Worry: Not on file     Inability: Not on file    Transportation needs:     Medical: Not on file     Non-medical: Not on file   Tobacco Use    Smoking status: Never Smoker    Smokeless tobacco: Never Used   Substance and Sexual Activity    Alcohol use: Yes     Comment: occasionally    Drug use: No    Sexual activity: Yes     Partners: Male     Birth control/protection: Surgical   Lifestyle    Physical activity:     Days per week: Not on file     Minutes per session: Not on file    Stress: Not on file   Relationships    Social connections:     Talks on phone: Not on file     Gets together: Not on file     Attends [employer] service: Not on file     Active member of club or organization: Not on file     Attends meetings of clubs or organizations: Not on file     Relationship status: Not on file   Other Topics Concern    Are you pregnant or think you may be? Not Asked    Breast-feeding Not Asked   Social History Narrative    Not on file     Past Surgical History:   Procedure Laterality Date    BREAST BIOPSY Left 01/29/2002    core bx    CARPAL TUNNEL RELEASE Right 05/2017    CHOLECYSTECTOMY  2004    COLONOSCOPY      10/11    COLONOSCOPY N/A 6/29/2017    Procedure: COLONOSCOPY;  Surgeon: López Moore MD;  Location: [facility] (4TH FLR);  Service: Endoscopy;  Laterality: N/A;    INTRAMEDULLARY RODDING OF FEMUR Left 5/21/2019    Procedure: INSERTION, INTRAMEDULLARY ARIEL, FEMUR;  Surgeon: López Duff MD;  Location: [facility] OR 2ND FLR;  Service: Orthopedics;  Laterality: Left;    REPAIR OF TRICEPS TENDON Left 10/17/2018    Procedure: REPAIR, TENDON, TRICEPS left elbow;  Surgeon: Staci Yarbrough MD;  Location: [facility] OR 1ST FLR;  Service: Orthopedics;  Laterality: Left;  Anesthesia: General and regional. PRONE, k-wire , hand pan 1 and pan 2, CALL ARTHREX/Tamera notified 10-12 LO    TONSILLECTOMY      TUBAL LIGATION  2003    UPPER GASTROINTESTINAL  ENDOSCOPY      10/11    uterine ablation  2003     Family History   Problem Relation Age of Onset    Cancer Mother         Lung Cancer    Emphysema Mother     Heart attack Mother     Cancer Father         Lung Cancer    Osteoarthritis Father     Lung cancer Father     Skin cancer Father     Heart disease Brother     Heart attack Brother     Osteoarthritis Paternal Aunt     Retinal detachment Maternal Aunt     No Known Problems Sister     No Known Problems Maternal Uncle     No Known Problems Paternal Uncle     No Known Problems Maternal Grandmother     No Known Problems Maternal Grandfather     No Known Problems Paternal Grandmother     No Known Problems Paternal Grandfather     Colon cancer Neg Hx     Esophageal cancer Neg Hx     Stomach cancer Neg Hx     Celiac disease Neg Hx     Diabetes Neg Hx     Thyroid disease Neg Hx     Amblyopia Neg Hx     Blindness Neg Hx     Cataracts Neg Hx     Glaucoma Neg Hx     Hypertension Neg Hx     Macular degeneration Neg Hx     Strabismus Neg Hx     Stroke Neg Hx            Review of Systems  General: negative for chills, fever or weight loss  Psychological: negative for mood changes or depression  Ophthalmic: negative for blurry vision, photophobia or eye pain  ENT: see HPI  Respiratory: no cough, shortness of breath, or wheezing  Cardiovascular: no chest pain or dyspnea on exertion  Gastrointestinal: no abdominal pain, change in bowel habits, or black/ bloody stools  Musculoskeletal: negative for gait disturbance or muscular weakness  Neurological: no syncope or seizures; no ataxia  Dermatological: negative for puritis,  rash and jaundice  Hematologic/lymphatic: no easy bruising, no new lumps or bumps      Physical Exam:    Vitals:    12/16/19 1001   BP: 126/86   Pulse: 110       Constitutional: Well appearing / communicating without difficutly.  NAD.  Eyes: EOM I Bilaterally  Head/Face: Normocephalic.  Negative paranasal sinus  pressure/tenderness.  Salivary glands WNL.  House Brackmann I Bilaterally.    Right Ear: Auricle normal appearance. External Auditory Canal within normal limits,TM w/o masses/lesions/perforations. TM mobility noted.   Left Ear: Auricle normal appearance. External Auditory Canal WNL,TM w/o masses/lesions/perforations. TM mobility noted.  Nose: Nasal septal deviation to the left. Inferior Turbinates 3+ bilaterally. No septal perforation. No masses/lesions. External nasal skin appears normal without masses/lesions.  Oral Cavity: Gingiva/lips within normal limits.  Dentition/gingiva healthy appearing. Mucus membranes moist. Floor of mouth soft, no masses palpated. Oral Tongue mobile. Hard Palate appears normal.    Oropharynx: Base of tongue appears normal. No masses/lesions noted. Tonsillar fossa/pharyngeal wall without lesions. Posterior oropharynx WNL.  Soft palate without masses. Midline uvula.   Neck/Lymphatic: No LAD I-VI bilaterally.  No thyromegaly.  No masses noted on exam.    Mirror laryngoscopy/nasopharyngoscopy: Active gag reflex.  Unable to perform.    Neuro/Psychiatric: AOx3.  Normal mood and affect.   Cardiovascular: Normal carotid pulses bilaterally, no increasing jugular venous distention noted at cervical region bilaterally.    Respiratory: Normal respiratory effort, no stridor, no retractions noted.      See separate procedure note for nasal endoscopy.     Diagnostic testing reviewed:  ImmunoCAP testing reviewed- negative      Assessment:    ICD-10-CM ICD-9-CM    1. Other chronic sinusitis J32.8 473.8    2. Hypertrophy of inferior nasal turbinate J34.3 478.0    3. Nasal septal deviation J34.2 470      The primary encounter diagnosis was Other chronic sinusitis. Diagnoses of Hypertrophy of inferior nasal turbinate and Nasal septal deviation were also pertinent to this visit.      Plan:  No orders of the defined types were placed in this encounter.    Start Levaquin 500mg PO BID for 14 days(patient  states she can not tolerate PO Augmentin).  Start medrol dose riya  Flonase 2 sprays per nostril daily  Astelin 1 spray BID  Start nasal saline rinses BID  Consider CT scan of the sinuses if not improved at follow up.    Rachelle Mcguire MD

## 2019-12-16 NOTE — PROCEDURES
Procedures     PROCEDURE NOTE:  Nasal endoscopy   Preprocedure diagnosis:  Chronic sinusitis  Postprocedure diangosis:  Same  Complications:  None  Blood Loss:  None    Procedure in detail:  After verbal consent was obtained, the patient's nasal cavity was anesthesized using topical 1%lidocaine and Neosynepherine.  A rigid 0 degree endoscope was placed in first the right, then the left nasal cavity.  The inferior and middle turbinates were examined, and found to be normal bilaterally.  The middle meatus and maxillary antrum was also examined, and found to be normal bilaterally.  Purulent drainage present bilaterally. No masses seen.  The patient tolerated the procedure with some difficulty.  There were no complications.

## 2019-12-18 ENCOUNTER — TELEPHONE (OUTPATIENT)
Dept: ENDOCRINOLOGY | Facility: HOSPITAL | Age: 59
End: 2019-12-18

## 2019-12-18 DIAGNOSIS — M84.352A STRESS FRACTURE OF NECK OF LEFT FEMUR: ICD-10-CM

## 2019-12-18 DIAGNOSIS — M81.0 POST-MENOPAUSAL OSTEOPOROSIS: Primary | ICD-10-CM

## 2019-12-18 DIAGNOSIS — T38.0X5A STEROID-INDUCED OSTEOPOROSIS: ICD-10-CM

## 2019-12-18 DIAGNOSIS — M81.8 STEROID-INDUCED OSTEOPOROSIS: ICD-10-CM

## 2019-12-18 RX ORDER — TERIPARATIDE 250 UG/ML
20 INJECTION, SOLUTION SUBCUTANEOUS DAILY
Qty: 2.4 ML | Refills: 11 | Status: SHIPPED | OUTPATIENT
Start: 2019-12-18 | End: 2021-01-07 | Stop reason: SDUPTHER

## 2019-12-18 NOTE — TELEPHONE ENCOUNTER
Discussed case at departmental conference. There are no trials looking specifically at what to do in this situation of fracturing while on denosumab in the setting of long term corticosteroids. The consensus was to continue treatment with denosumab every 6 months and add daily Forteo for 2 years; after two years, continue with denosumab monotherapy indefinitely. Will send Rx to specialty pharmacy for authorization.    There is strong data on BMD improvement and fracture prevention with combination therapy; although in the DATA trials, they were both started simultaneously. In addition, both agents used in monotherapy have shown efficacy in treating corticosteroid induced osteoporosis. Switching directly from denosumab to Forteo monotherapy has been associated with transient increase in bone turnover and BMD loss at the hip, which may increase fracture risk. This would not be ideal in a patient already at high risk for fractures. Reviewed history and labs, and she has no contraindications to Forteo use.

## 2019-12-18 NOTE — TELEPHONE ENCOUNTER
Patient contacted regarding TAF renewal for Kevzara and Ashok. She will contact TAF to obtain PIN for renewal and call [facility] back to submit on her behalf. Regarding the Evenity, she is over the income limit for the  yasmine so she will proceed with $70 copay. Also mentioned that MD sent her message that he prefers Forteo over Evenity so will have pharmacist clarify before we proceed. Patient verbalized understanding.

## 2019-12-19 ENCOUNTER — TELEPHONE (OUTPATIENT)
Dept: PHARMACY | Facility: CLINIC | Age: 59
End: 2019-12-19

## 2019-12-19 NOTE — TELEPHONE ENCOUNTER
Informed Patient  that [facility] received prescription for Forteo and benefits investigation is required.  [facility] will be back in touch once insurance determination is received.

## 2019-12-20 ENCOUNTER — LAB VISIT (OUTPATIENT)
Dept: LAB | Facility: HOSPITAL | Age: 59
End: 2019-12-20
Payer: MEDICARE

## 2019-12-20 DIAGNOSIS — D50.0 IRON DEFICIENCY ANEMIA DUE TO CHRONIC BLOOD LOSS: ICD-10-CM

## 2019-12-20 LAB
BASOPHILS # BLD AUTO: 0.07 K/UL (ref 0–0.2)
BASOPHILS NFR BLD: 0.6 % (ref 0–1.9)
DIFFERENTIAL METHOD: ABNORMAL
EOSINOPHIL # BLD AUTO: 0 K/UL (ref 0–0.5)
EOSINOPHIL NFR BLD: 0.1 % (ref 0–8)
ERYTHROCYTE [DISTWIDTH] IN BLOOD BY AUTOMATED COUNT: 16.2 % (ref 11.5–14.5)
FERRITIN SERPL-MCNC: 595 NG/ML (ref 20–300)
HCT VFR BLD AUTO: 42.7 % (ref 37–48.5)
HGB BLD-MCNC: 13.3 G/DL (ref 12–16)
IMM GRANULOCYTES # BLD AUTO: 0.05 K/UL (ref 0–0.04)
IMM GRANULOCYTES NFR BLD AUTO: 0.4 % (ref 0–0.5)
LYMPHOCYTES # BLD AUTO: 1.1 K/UL (ref 1–4.8)
LYMPHOCYTES NFR BLD: 9.5 % (ref 18–48)
MCH RBC QN AUTO: 28.7 PG (ref 27–31)
MCHC RBC AUTO-ENTMCNC: 31.1 G/DL (ref 32–36)
MCV RBC AUTO: 92 FL (ref 82–98)
MONOCYTES # BLD AUTO: 0.9 K/UL (ref 0.3–1)
MONOCYTES NFR BLD: 8.2 % (ref 4–15)
NEUTROPHILS # BLD AUTO: 9.2 K/UL (ref 1.8–7.7)
NEUTROPHILS NFR BLD: 81.2 % (ref 38–73)
NRBC BLD-RTO: 0 /100 WBC
PLATELET # BLD AUTO: 232 K/UL (ref 150–350)
PMV BLD AUTO: 10.7 FL (ref 9.2–12.9)
RBC # BLD AUTO: 4.63 M/UL (ref 4–5.4)
WBC # BLD AUTO: 11.31 K/UL (ref 3.9–12.7)

## 2019-12-20 PROCEDURE — 36415 COLL VENOUS BLD VENIPUNCTURE: CPT

## 2019-12-20 PROCEDURE — 85025 COMPLETE CBC W/AUTO DIFF WBC: CPT

## 2019-12-20 PROCEDURE — 82728 ASSAY OF FERRITIN: CPT

## 2019-12-23 NOTE — TELEPHONE ENCOUNTER
RA follow-up attempted:   Patient requested a call back, she was in the middle of cooking and had a room full of people. Will follow-up.

## 2019-12-26 NOTE — PROGRESS NOTES
Physical Therapy Daily Treatment Note     Name: Joyce Peterson  Clinic Number: [id number]    Therapy Diagnosis:   Encounter Diagnosis   Name Primary?    Difficulty walking      Physician: Jett Julien III, *    Visit Date: 12/30/2019  Physician Orders: PT Eval and Treat aquatic therapy  Medical Diagnosis from Referral:   M25.551 (ICD-10-CM) - Right hip pain   M25.552 (ICD-10-CM) - Left hip pain         Evaluation Date: 12/5/2019  Authorization Period Expiration: 12/31/2019  Plan of Care Expiration: 2/27/2020  Visit # / Visits authorized: 2/20  Total visits: 2      Time In: 1345  Time Out: 1445  Total Billable Time: 60 minutes    Precautions: osteoporosis with pathological fx L femur, asthma, RA, Sjogrens, R foot metatarsal fx 3-5 patholigical fx    PROCEDURES/IMAGING: IMN L femur 5/21/2019, L  triceps tendon repair 10/17/2018  Subjective     Pt reports: My hips hurt at night when I'm sleeping on them.  She was compliant with home exercise program.  Response to previous treatment: 1st pool treatment  Functional change: none, walks into clinic    Pain: 4/10 hips & 9/10 R foot  Location: bilateral hips & R foot    Objective     Joyce received aquatic therapeutic exercises to develop strength, endurance, ROM, flexibility, posture and core stabilization for 60 minutes including:    WARMUP x 2 laps each  Walk fwd/back/side    STRETCHES 3 x 20sec  Hamstring  Gastroc    LE EX x 20  Mini squat  Hip abd/add  Hip flex/ext- single UE DB support  LAQ with hip flex  HS curls    UE EX/CORE  x 20  Shoulder flex/ext TA activation APO  Shoulder horizontal abd/add TA activation APO  Shoulder abd/add TA activation APO  Rows orange tubing  Shoulder extension orange tubing  Chest press orange tubing  Shoulder flexion orange tubing    ENDURANCE  Bicycle // bars x 5 min    COOL DOWN x 1 lap each  Walk fwd/back/side      Home Exercises Provided and Patient Education Provided     Education provided:   Role of aquatic  therapy  Hydration post therapy      Written Home Exercises Provided: Patient instructed to cont prior HEP.  Exercises were reviewed and Joyce was able to demonstrate them prior to the end of the session.  Joyce demonstrated good  understanding of the education provided.     See EMR under Patient Instructions for exercises provided prior visit 12/5/2019    Assessment     Patient required frequent verbal cues  for proper technique, movement in pain free range & core stabilization. Patient did well post instruction. Patient will benefit from aquatic environment to unload  Bilateral LEs for strengthening, core stabilization & postural awareness.      Joyce is progressing well towards her goals.   Pt prognosis is Fair.     Pt will continue to benefit from skilled aquatic outpatient physical therapy to address the deficits listed in the problem list box on initial evaluation, provide pt/family education and to maximize pt's level of independence in the home and community environment.     Pt's spiritual, cultural and educational needs considered and pt agreeable to plan of care and goals.    Anticipated barriers to physical therapy: history of pathological fractures    Goals:   Short Term Goals: 6 weeks   1. Patient will be independent in HEP & progressions (progressing)  2. Patient will achieve TUG score of 13 sec or less to demonstrate improved mobility (progressing)     Long Term Goals: 12 weeks   1. Patient  will have FOTO limitation score of 46% to demonstrate improved function & decreased pain (progressing)  2. Patient will increase L hip MMT to 4+/5 to demonstrate improved strength (progressing)  3. Patient will be able to ascend/descend 5 steps with use of single railing & pain level 3/10 or less (progressing)  4. Patient will be able to walk 100ft without AD & without gait deviations (progressing)        Plan     Increase exercises slowly 2/2 history of pathological fractures    Plan of care Certification:  12/5/2019 to 2/27/2020.     Outpatient aquatic Physical Therapy 1-2 times weekly for  12 weeks to include the following interventions: aquatic therapy, manual therapy, patient education, therapeutic exercise, therapeutic activity  Patient may be seen by PTA as part of the rehabilitation team    Cathleen Acosta, PT

## 2019-12-27 ENCOUNTER — PATIENT MESSAGE (OUTPATIENT)
Dept: RHEUMATOLOGY | Facility: CLINIC | Age: 59
End: 2019-12-27

## 2019-12-27 DIAGNOSIS — M06.9 RHEUMATOID ARTHRITIS, INVOLVING UNSPECIFIED SITE, UNSPECIFIED RHEUMATOID FACTOR PRESENCE: ICD-10-CM

## 2019-12-27 RX ORDER — LEFLUNOMIDE 20 MG/1
TABLET ORAL
Qty: 30 TABLET | Refills: 0 | Status: SHIPPED | OUTPATIENT
Start: 2019-12-27 | End: 2020-01-13 | Stop reason: SDUPTHER

## 2019-12-30 ENCOUNTER — CLINICAL SUPPORT (OUTPATIENT)
Dept: REHABILITATION | Facility: HOSPITAL | Age: 59
End: 2019-12-30
Payer: MEDICARE

## 2019-12-30 ENCOUNTER — TELEPHONE (OUTPATIENT)
Dept: PHARMACY | Facility: CLINIC | Age: 59
End: 2019-12-30

## 2019-12-30 DIAGNOSIS — R26.2 DIFFICULTY WALKING: ICD-10-CM

## 2019-12-30 PROCEDURE — 97113 AQUATIC THERAPY/EXERCISES: CPT

## 2019-12-30 NOTE — TELEPHONE ENCOUNTER
Refill and follow up complete Kevzara. Patient confirmed need for refill, she has 1 dose on hand for today 12/30. She does report missing dose of Kevzara due to bronchitis illness, but is now feeling better. Shipment set for 1/2 for patient to receive 1/3. Address verified. $10 copay (004). Patient denies any changes with other medications, conditions or illness. She is still taking once every 2 weeks as directed. No side effects or concerns today, patient is aware to let [facility] know if anything comes up.

## 2019-12-31 ENCOUNTER — TELEPHONE (OUTPATIENT)
Dept: PHARMACY | Facility: CLINIC | Age: 59
End: 2019-12-31

## 2019-12-31 NOTE — TELEPHONE ENCOUNTER
DOCUMENTATION ONLY:  Prior authorization for Forteo 20 mcg Pen #2.4 mL/28 days  approved from 01/01/2019 to 12/31/2021  Case ID: [id number]    Co-pay: $50.00    Patient Assistance IS required. Sending to the financial assistance team to investigate assistance options. Rafaela DEL ANGEL

## 2020-01-06 ENCOUNTER — OFFICE VISIT (OUTPATIENT)
Dept: PODIATRY | Facility: CLINIC | Age: 60
End: 2020-01-06
Payer: MEDICARE

## 2020-01-06 ENCOUNTER — APPOINTMENT (OUTPATIENT)
Dept: RADIOLOGY | Facility: OTHER | Age: 60
End: 2020-01-06
Attending: PODIATRIST
Payer: MEDICARE

## 2020-01-06 ENCOUNTER — PATIENT OUTREACH (OUTPATIENT)
Dept: ADMINISTRATIVE | Facility: OTHER | Age: 60
End: 2020-01-06

## 2020-01-06 VITALS — SYSTOLIC BLOOD PRESSURE: 119 MMHG | DIASTOLIC BLOOD PRESSURE: 71 MMHG | HEART RATE: 97 BPM

## 2020-01-06 DIAGNOSIS — M84.474S METATARSAL FRACTURE, PATHOLOGIC, RIGHT, SEQUELA: Primary | ICD-10-CM

## 2020-01-06 DIAGNOSIS — M84.474S METATARSAL FRACTURE, PATHOLOGIC, RIGHT, SEQUELA: ICD-10-CM

## 2020-01-06 DIAGNOSIS — M06.9 RHEUMATOID ARTHRITIS INVOLVING MULTIPLE SITES, UNSPECIFIED RHEUMATOID FACTOR PRESENCE: ICD-10-CM

## 2020-01-06 DIAGNOSIS — Z87.312 HISTORY OF STRESS FRACTURE: ICD-10-CM

## 2020-01-06 PROCEDURE — 99999 PR PBB SHADOW E&M-EST. PATIENT-LVL IV: ICD-10-PCS | Mod: PBBFAC,,, | Performed by: PODIATRIST

## 2020-01-06 PROCEDURE — 73630 XR FOOT COMPLETE 3 VIEW RIGHT: ICD-10-PCS | Mod: 26,RT,, | Performed by: RADIOLOGY

## 2020-01-06 PROCEDURE — 73630 X-RAY EXAM OF FOOT: CPT | Mod: TC,RT

## 2020-01-06 PROCEDURE — 73630 X-RAY EXAM OF FOOT: CPT | Mod: 26,RT,, | Performed by: RADIOLOGY

## 2020-01-06 PROCEDURE — 99999 PR PBB SHADOW E&M-EST. PATIENT-LVL IV: CPT | Mod: PBBFAC,,, | Performed by: PODIATRIST

## 2020-01-06 PROCEDURE — 99213 PR OFFICE/OUTPT VISIT, EST, LEVL III, 20-29 MIN: ICD-10-PCS | Mod: S$GLB,,, | Performed by: PODIATRIST

## 2020-01-06 PROCEDURE — 99213 OFFICE O/P EST LOW 20 MIN: CPT | Mod: S$GLB,,, | Performed by: PODIATRIST

## 2020-01-06 RX ORDER — FLUCONAZOLE 100 MG/1
TABLET ORAL
COMMUNITY
Start: 2020-01-02 | End: 2020-03-17 | Stop reason: SDUPTHER

## 2020-01-06 NOTE — PROGRESS NOTES
Chief Complaint   Patient presents with    Foot Problem     metatarsal fracture          HPI:       Joyce Peterson is a 59 y.o. female who presents to clinic for chronic right foot pain.  She does not recall acute trauma to the area.   She has history of Rheumatoid Arthritis,  Patient has history of multiple idiopathic metatarsal fractures of the right foot.   She is on Prednisone.    She has difficulty walking.  There is pain at the midfoot when walking, even with athletic shoes.  range of motion is limited.   She is using a bone stimulator.   She has tried a CAM boot for a few weeks but it was affecting her hips.  She is pending aqua therapy for hip pain.           Patient Active Problem List   Diagnosis    Osteoarthritis    GERD (gastroesophageal reflux disease)    Gastroparesis    Steroid-induced osteoporosis    Coronary artery disease    Lumbar spondylosis    CS (cervical spondylosis)    Thyroid nodule    Hyperlipidemia    Uncomplicated asthma    AR (allergic rhinitis)    Immunosuppressed status    Rheumatoid arthritis involving multiple sites    Carpal tunnel syndrome of right wrist    Elevated parathyroid hormone    Hypogammaglobulinemia    Right carpal tunnel syndrome    Pain in right hand    Pain in right elbow    Decreased  strength of right hand    Right shoulder pain    Iron deficiency anemia due to chronic blood loss    Pure hypercholesterolemia    Research study patient    Sjogren's syndrome    Esophageal candidiasis    Rupture of left triceps tendon    Left hip pain    Gait abnormality    Greater trochanteric bursitis of left hip    Stress fracture of neck of left femur    Stress fracture of neck of femur with delayed healing    Coronary artery disease involving native coronary artery of native heart without angina pectoris    Buttock pain    Difficulty walking               Current Outpatient Medications on File Prior to Visit   Medication Sig Dispense  Refill    albuterol (ACCUNEB) 1.25 mg/3 mL Nebu Take 3 mLs (1.25 mg total) by nebulization every 6 (six) hours as needed. Rescue 1 Box 2    albuterol (PROVENTIL HFA) 90 mcg/actuation inhaler Inhale 2 puffs into the lungs every 6 (six) hours as needed. Rescue 20.1 Inhaler 11    amoxicillin (AMOXIL) 500 MG capsule Take 1 capsule by mouth three times a day. 30 capsule 0    ampicillin (PRINCIPEN) 500 MG capsule TAKE 1 CAPUSLE BY MOUTH EVERY 6 HOURS  2    aspirin 81 mg Tab Take 81 mg by mouth every morning.       azelastine (ASTELIN) 137 mcg nasal spray 1 spray (137 mcg total) by Nasal route 2 (two) times daily. (Patient taking differently: 1 spray by Nasal route 2 (two) times daily as needed. ) 30 mL 11    benzonatate (TESSALON) 100 MG capsule Take 1 capsule by mouth three times a day 9 capsule 0    budesonide-formoterol 160-4.5 mcg (SYMBICORT) 160-4.5 mcg/actuation HFAA Inhale 2 puffs into the lungs every 12 (twelve) hours. 3 Inhaler 3    calcium citrate-vitamin D (CITRACAL + D) 315-200 mg-unit per tablet Take 1 tablet by mouth 2 (two) times daily.       diazePAM (VALIUM) 10 MG Tab Take 1 tablet (10 mg total) by mouth On call Procedure. 3 tablet 0    diclofenac sodium (VOLTAREN) 1 % Gel Apply 2 g topically 4 (four) times daily as needed. 500 g 5    doxycycline (MONODOX) 50 MG Cap Take 1 capsule (50 mg total) by mouth 2 (two) times daily. 60 capsule 6    fluconazole (DIFLUCAN) 100 MG tablet       fluticasone propionate (FLONASE) 50 mcg/actuation nasal spray 2 sprays (100 mcg total) by Each Nostril route once daily. 1 Bottle 3    GUAIFENESIN (MUCINEX ORAL) Take 400 mg by mouth every 4 (four) hours as needed.       INV PROPULSID 10 MG Take 1 tablets (20 mg) by mouth 4 (four) times daily. FOR INVESTIGATIONAL USE ONLY. Protocol [id number] each 0    leflunomide (ARAVA) 20 MG Tab TAKE 1 TABLET BY MOUTH EVERY DAY 30 tablet 0    lifitegrast (XIIDRA) 5 % Dpet Place 1 drop into both eyes 2 (two) times  "daily. 60 each 12    methylPREDNISolone (MEDROL DOSEPACK) 4 mg tablet use as directed 1 Package 0    montelukast (SINGULAIR) 10 mg tablet Take 1 tablet (10 mg total) by mouth nightly. 90 tablet 3    naproxen (NAPROSYN) 500 MG tablet TAKE 1 TABLET TWICE A DAY AS NEEDED 180 tablet 0    omeprazole (PRILOSEC) 40 MG capsule Take 1 capsule (40 mg total) by mouth every morning. 30 capsule 6    omeprazole-sodium bicarbonate (ZEGERID) 40-1.1 mg-gram per capsule TAKE 1 CAPSULE BY MOUTH EVERY MORNING. 90 capsule 3    POTASSIUM ORAL Take by mouth once daily.      predniSONE (DELTASONE) 5 MG tablet Take 1 tablet (5 mg total) by mouth once daily. 90 tablet 1    PREMARIN 0.625 mg tablet Take 0.625 mg by mouth once daily.  4    PREMARIN vaginal cream PLACE 0.5 GRAM VAGINALLY 3 (THREE) TIMES A WEEK. 30 g 3    progesterone (PROMETRIUM) 100 MG capsule Take 100 mg by mouth every morning. 1 Capsule Oral Every day, morning      promethazine-dextromethorphan (PROMETHAZINE-DM) 6.25-15 mg/5 mL Syrp Take 5 mL by mouth every 6 hours as needed 140 mL 0    RESTASIS 0.05 % ophthalmic emulsion PLACE 0.4 MLS (1 DROP TOTAL) INTO BOTH EYES 2 (TWO) TIMES DAILY.  5    rizatriptan (MAXALT) 10 MG tablet Take 10 mg by mouth as needed for Migraine.       rosuvastatin (CRESTOR) 20 MG tablet Take 1 tablet (20 mg total) by mouth every evening. 90 tablet 3    sarilumab (KEVZARA) 200 mg/1.14 mL Syrg Inject 1.14 mLs (200 mg total) into the skin every 14 (fourteen) days. 2.28 mL 0    sucralfate (CARAFATE) 1 gram tablet TAKE 1 TABLET (1 G TOTAL) BY MOUTH 4 (FOUR) TIMES DAILY. 360 tablet 3    syringe with needle (TUBERCULIN SYRINGE) 1 mL 27 x 1/2" Syrg To administer methotrexate 7.5mg sc once a week 12 Syringe 3    teriparatide (FORTEO) 20 mcg/dose - 600 mcg/2.4 mL PnIj Inject 0.08 mLs (20 mcg total) into the skin once daily. 2.4 mL 11    traMADol (ULTRAM) 50 mg tablet Take 1 tablet (50 mg total) by mouth every 24 hours as needed for Pain. 7 " "tablet 0    valACYclovir (VALTREX) 1000 MG tablet TAKE 1 TABLET BY MOUTH TWICE A DAY AS NEEDED 20 tablet 3     Current Facility-Administered Medications on File Prior to Visit   Medication Dose Route Frequency Provider Last Rate Last Dose    0.9%  NaCl infusion   Intravenous Continuous Callum Singer MD   Stopped at 05/21/19 1100           Review of patient's allergies indicates:   Allergen Reactions    Actemra [tocilizumab] Other (See Comments)     Severe dizziness    Codeine Nausea And Vomiting    Gold au 198 Hives and Rash    Hydroxychloroquine Other (See Comments)     Can't remember the reaction      Iodinated contrast media - oral and iv dye Other (See Comments)     Other reaction(s): BURNING ALL OVER    Iodine Other (See Comments)     Other reaction(s): BURNING ALL OVER - Iodine dye - Not topical    Sulfa (sulfonamide antibiotics) Other (See Comments)     Can't remember the reaction    Zofran [ondansetron hcl (pf)] Nausea And Vomiting     Pt reports that last time she received zofran she started vomiting again    Methotrexate analogues Nausea Only    Pneumovax 23 [pneumococcal 23-argenis ps vaccine] Other (See Comments)     "sick"           Social History     Socioeconomic History    Marital status:      Spouse name: Not on file    Number of children: Not on file    Years of education: Not on file    Highest education level: Not on file   Occupational History    Not on file   Social Needs    Financial resource strain: Not on file    Food insecurity:     Worry: Not on file     Inability: Not on file    Transportation needs:     Medical: Not on file     Non-medical: Not on file   Tobacco Use    Smoking status: Never Smoker    Smokeless tobacco: Never Used   Substance and Sexual Activity    Alcohol use: Yes     Comment: occasionally    Drug use: No    Sexual activity: Yes     Partners: Male     Birth control/protection: Surgical   Lifestyle    Physical activity:     Days per week: " Not on file     Minutes per session: Not on file    Stress: Not on file   Relationships    Social connections:     Talks on phone: Not on file     Gets together: Not on file     Attends Christianity service: Not on file     Active member of club or organization: Not on file     Attends meetings of clubs or organizations: Not on file     Relationship status: Not on file   Other Topics Concern    Are you pregnant or think you may be? Not Asked    Breast-feeding Not Asked   Social History Narrative    Not on file           ROS:  General ROS: negative for  chills, fatigue or fever  Cardiovascular ROS: no chest pain or dyspnea on exertion  Musculoskeletal ROS: negative for joint pain or joint stiffness.  Negative for loss of strength.  Positive for foot pain.   Neuro ROS: Negative for syncope, numbness, or muscle weakness  Skin ROS: Negative for rash, itching or nail/hair changes.           OBJECTIVE:         Vitals:    01/06/20 1401   BP: 119/71   BP Location: Right arm   Patient Position: Sitting   BP Method: Medium (Automatic)   Pulse: 97        Bilateral  Lower extremity exam:    Vasc: Palpable pedal pulses. Feet appropriately warm to touch. Cap refill time is within normal limits.  There is minimal edema to the right forefoot.      Neurological:  Light touch, proprioception, and Sharp/dull sensation are all intact. There is no Tinel's along the tarsal tunnel.    Negative genesis's.  No sensorimotor deficitis.     Derm: No open lesions, macerations, or rashes.  No bruising.  No open wounds.   Redness over the bilateral bunions and tailor's bunions      MSK:  Mild  tenderness to palpation at the midfoot.   bilateral bunions and tailors bunion with redness 2/2 shoe irritation, worse on the right.    Limited 1st metatarso-phalangeal joint range of motion.   bilateral adductovarus 5th toes.   bilateral pes planus.       8/21/19  FINDINGS:  Healing fractures of the 3rd, 4th and 5th metatarsal bones identified.  The  position alignment is unchanged as compared to the previous study.  DJD, hallux valgus and bunion formation.  Small calcification noted the adjacent to the 1st metatarsophalangeal joint medially.  Pes planus.      9/11/19  Impression       No significant change in appearance of the 3rd and 4th metatarsal fractures with callus formations and persistent lucent fracture line.    Unchanged appearance of the 5th metatarsal fracture which appears well healed.       10/31/19   Impression       There is a 2nd metatarsal fracture which was suggested on 09/20/2019 MRI.  There has been small amount of callus formation and no significant displacement.    Similar appearance of the well callused 3rd through 5th metatarsal fractures.    Degenerative changes of the foot as above.         Xrays 1/6/2020  COMPARISON:  10/31/2019   FINDINGS:  Healing fractures of the 2nd through 5th metatarsal bones identified.  The position alignment is satisfactory and unchanged as compared to the previous study.  DJD and hallux valgus.  Pes planus.        ASSESSMENT/PLAN:        Metatarsal fracture, pathologic, right, sequela    History of stress fracture    Rheumatoid arthritis involving multiple sites, unspecified rheumatoid factor presence         · I counseled the patient on the patient's conditions, their implications and medical management.  Xrays reviewed with the patient.   · Still having discomfort and pain when walking due to limited range of motion due to arthritis.  Consider custom foot orthotics vs rocker bottom shoes.  She will look into this.  · Otherwise, athletic shoes, well padded soles only.  Never barefoot walking.      · Monitor for worsening signs and symptoms.  follow up in about two months.

## 2020-01-07 ENCOUNTER — TELEPHONE (OUTPATIENT)
Dept: PHARMACY | Facility: CLINIC | Age: 60
End: 2020-01-07

## 2020-01-07 ENCOUNTER — PATIENT MESSAGE (OUTPATIENT)
Dept: RHEUMATOLOGY | Facility: CLINIC | Age: 60
End: 2020-01-07

## 2020-01-07 ENCOUNTER — CLINICAL SUPPORT (OUTPATIENT)
Dept: REHABILITATION | Facility: HOSPITAL | Age: 60
End: 2020-01-07
Payer: MEDICARE

## 2020-01-07 DIAGNOSIS — R26.2 DIFFICULTY WALKING: ICD-10-CM

## 2020-01-07 PROCEDURE — 97113 AQUATIC THERAPY/EXERCISES: CPT

## 2020-01-07 NOTE — PROGRESS NOTES
Physical Therapy Daily Treatment Note     Name: Joyce Peterson  Clinic Number: [id number]    Therapy Diagnosis:   Encounter Diagnosis   Name Primary?    Difficulty walking      Physician: Jett Julien III, *    Visit Date: 1/7/2020  Physician Orders: PT Eval and Treat aquatic therapy  Medical Diagnosis from Referral:   M25.551 (ICD-10-CM) - Right hip pain   M25.552 (ICD-10-CM) - Left hip pain         Evaluation Date: 12/5/2019  Authorization Period Expiration: 12/31/2020  Plan of Care Expiration: 2/27/2020  Visit # / Visits authorized: 1/20  Total visits: 3      Time In: 1300  Time Out: 1400  Total Billable Time: 30 minutes    Precautions: osteoporosis with pathological fx L femur, asthma, RA, Sjogrens, R foot metatarsal fx 3-5 patholigical fx    PROCEDURES/IMAGING: IMN L femur 5/21/2019, L  triceps tendon repair 10/17/2018  Subjective     Pt reports: I feel OK  She was compliant with home exercise program.  Response to previous treatment: muscle soreness x 3 days  Functional change: none, walks into clinic    Pain: 7/10  Location: bilateral hips, R foot, neck    Objective     Joyce received aquatic therapeutic exercises to develop strength, endurance, ROM, flexibility, posture and core stabilization for 60 minutes including:    WARMUP x 2 laps each  Walk fwd/back/side    STRETCHES 3 x 20sec  Hamstring  Gastroc    LE EX x 20  Mini squat  Hip abd/add  Hip flex/ext- single UE DB support  LAQ with hip flex  HS curls  Lunge forward  Lunge lateral    UE EX/CORE  x 20  Shoulder flex/ext TA activation APO modified tandem  Shoulder horizontal abd/add TA activation APO modified tandem  Shoulder abd/add TA activation APO modified tandem  Rows orange tubing  Shoulder extension orange tubing  Chest press orange tubing  Shoulder flexion orange tubing    ENDURANCE  Bicycle // bars x 5 min    COOL DOWN x 1 lap each  Walk fwd/back/side      Home Exercises Provided and Patient Education Provided     Education provided:   Role  of aquatic therapy  Hydration post therapy      Written Home Exercises Provided: Patient instructed to cont prior HEP.  Exercises were reviewed and Joyce was able to demonstrate them prior to the end of the session.  Joyce demonstrated good  understanding of the education provided.     See EMR under Patient Instructions for exercises provided prior visit 12/5/2019    Assessment     Patient required intermittent verbal cues for movement in pain free range & TA activation especially with single LE exercises.Patient was able to tolerate addition of lunges with no increase pain.. Patient did well post instruction. Patient will benefit from aquatic environment to unload  Bilateral LEs for strengthening, core stabilization & postural awareness.      Joyce is progressing well towards her goals.   Pt prognosis is Fair.     Pt will continue to benefit from skilled aquatic outpatient physical therapy to address the deficits listed in the problem list box on initial evaluation, provide pt/family education and to maximize pt's level of independence in the home and community environment.     Pt's spiritual, cultural and educational needs considered and pt agreeable to plan of care and goals.    Anticipated barriers to physical therapy: history of pathological fractures    Goals:   Short Term Goals: 6 weeks   1. Patient will be independent in HEP & progressions (progressing)  2. Patient will achieve TUG score of 13 sec or less to demonstrate improved mobility (progressing)     Long Term Goals: 12 weeks   1. Patient  will have FOTO limitation score of 46% to demonstrate improved function & decreased pain (progressing)  2. Patient will increase L hip MMT to 4+/5 to demonstrate improved strength (progressing)  3. Patient will be able to ascend/descend 5 steps with use of single railing & pain level 3/10 or less (progressing)  4. Patient will be able to walk 100ft without AD & without gait deviations (progressing)        Plan      Increase exercises slowly 2/2 history of pathological fractures    Plan of care Certification: 12/5/2019 to 2/27/2020.     Outpatient aquatic Physical Therapy 1-2 times weekly for  12 weeks to include the following interventions: aquatic therapy, manual therapy, patient education, therapeutic exercise, therapeutic activity  Patient may be seen by PTA as part of the rehabilitation team    Cathleen Acosta, PT

## 2020-01-07 NOTE — TELEPHONE ENCOUNTER
"Initial Forteo consult completed on 19. Forteo will be shipped on  to arrive at patient's home on  via [employer]. $55 copay; CC on file. Patient plans to start Forteo on .  Address confirmed. First fill will be sent with Pen Needles and Alcohol Swabs. Sharps not needed - patient existing with pharmacy. Confirmed 2 patient identifiers - name and . 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 Refills expected to align with Kevzara, so no refill activity queued. Most recent Kevzara fill will be started on 20.     Indication: osteoporosis  Goals of Therapy: slow disease progression, strengthen bones    Ms. Peterson reports:   Tried and Failed meds: fosamax, other oral meds - GI intolerance, Prolia (plans continue while on Forteo therapy). inBasket sent to provider to confirm.    Smoker: never  Alcohol: occasional  Hx Oral glucocorticoids >5 mg/d of prednisone for >3 months (ever): yes  Recent Falls: no  Fracture Hx: 5 bones - ribs, femurs, foot. Last in August (foot). Healing complete.   Recent trips to Urgent Care or the Emergency Room: no  Activity Level: PT for hips - 2x weekly. Self sufficient. 5 steps to enter. No assistive devices. Balance "not too good." No limitations on arm strength.    Pain intensity: 6  Overall Health QoL: 5 (10-best)    Lab Results   Component Value Date    CALCIUM 9.4 2019    ALBUMIN 4.4 2019    PHOS 2.8 2019    [id number] 37 2019       most current DEXA scan T-score is minus 1.2 2019.     Counseled patient on storage, administration, and disposal.     Missed Doses: if remembered on same day, dose may be taken. If remembered on the next day, skip dose - never take more than 1 dose in a day.     Patient was counseled on possible side effects: dizziness, joint pain, upset stomach.  Patient advised to administer injection while seated and to watch for symptoms of orthostatic hypotension.  BBW: Osteosarcoma     DDIs: " Medication list and allergies reviewed. No DDIs or allergies with Forteo. Patient understands to report any medication changes to [facility] and provider.    Patient informed of [facility] hours, refill procedures, and on-call pharmacist 24 hours a day. Patient verbalized understanding. Consultation included: the importance of compliance; the importance of laboratory monitoring; and the importance of keeping all follow up appointments. Reviewed importance of Ca + Vit D supplements and weight bearing exercises. Fall risk factors reviewed. All questions answered and addressed to patients satisfaction. [facility] will touchbase with patient 1 week from start, [facility] to contact patient in 3 weeks for refills.     Storage, administration, and disposal:  -Keep your FORTEO delivery device in the refrigerator between 36° to 46°F (2° to 8°C).  -Do not use FORTEO after the expiration date printed on the delivery device and packaging. Throw away the -FORTEO delivery device after 28 days even if it has medicine in it   - Wash hands before and after injection.  - Monthly RX will come with pen needles and alcohol swabs.  - Patient may inject in either the tops of the thighs or abdomen- but at least 2 inches away from belly button.  Patient was instructed to rotate injections sites.  - Patient is to wipe down the injection site with the alcohol pad, wait to dry.    1. Pull off white cap,   2. Screw on new pen needle, remove outer cap.   3. Set dose by pulling out black injection button until it stops - make sure red line shows.  4. Remove inner needle cover.  5. Gently squeeze the area of the cleaned skin and hold it firmly. Insert needle straight into the skin. Push black injection button until it stops - hold and count to 5 to ensure complete dose administered.  6. Pull the needle from skin and confirm dose - black button all the way in confirms full dose administered.  7. Use large needle cover to remove needle safely and recap.  - Patient informed  that online website has step-by-step instruction video.     - Patient will use sharps container to discard all injectable items; once full, per [place] law, s/he may lock the sharps container and place in her trash. S/he can then contact the Pharmacy and we will replace the sharps at no additional charge.

## 2020-01-09 NOTE — PROGRESS NOTES
Physical Therapy Daily Treatment Note     Name: Joyce Peterson  Clinic Number: [id number]    Therapy Diagnosis:   Encounter Diagnosis   Name Primary?    Difficulty walking      Physician: Jett Julien III, *    Visit Date: 1/10/2020  Physician Orders: PT Eval and Treat aquatic therapy  Medical Diagnosis from Referral:   M25.551 (ICD-10-CM) - Right hip pain   M25.552 (ICD-10-CM) - Left hip pain         Evaluation Date: 12/5/2019  Authorization Period Expiration: 12/31/2020  Plan of Care Expiration: 2/27/2020  Visit # / Visits authorized: 2/20  Total visits: 3      Time In: 1300  Time Out: 1400  Total Billable Time: 30 minutes    Precautions: osteoporosis with pathological fx L femur, asthma, RA, Sjogrens, R foot metatarsal fx 3-5 patholigical fx    PROCEDURES/IMAGING: IMN L femur 5/21/2019, L  triceps tendon repair 10/17/2018  Subjective     Pt reports: My left leg aches especially @ night  She was compliant with home exercise program.  Response to previous treatment: muscle soreness x 1 day  Functional change: none, walks into clinic    Pain: 6/10  Location: bilateral hips, R foot, neck    Objective     Joyce received aquatic therapeutic exercises to develop strength, endurance, ROM, flexibility, posture and core stabilization for 60 minutes including:    WARMUP x 2 laps each  Walk fwd/back/side    STRETCHES 3 x 20sec  Hamstring  Gastroc    LE EX x 20  Mini squat  Hip abd/add  Hip flex/ext- single UE DB support  LAQ with hip flex  HS curls  Lunge forward  Lunge lateral    UE EX/CORE  x 20  Shoulder flex/ext TA activation APO modified tandem alternating  Shoulder horizontal abd/add TA activation APO modified tandem alternating  Shoulder abd/add TA activation APO modified tandem alternating  Rows orange tubing modified tandem  Shoulder extension orange tubing modified tandem  Chest press orange tubing modified tandem  Shoulder flexion orange tubing modified tandem    ENDURANCE  Bicycle // bars x 6 min    COOL  DOWN x 1 lap each  Walk fwd/back/side      Home Exercises Provided and Patient Education Provided     Education provided:   Role of aquatic therapy  Hydration post therapy      Written Home Exercises Provided: Patient instructed to cont prior HEP.  Exercises were reviewed and Joyce was able to demonstrate them prior to the end of the session.  Joyce demonstrated good  understanding of the education provided.     See EMR under Patient Instructions for exercises provided prior visit 12/5/2019    Assessment     Patient required intermittent verbal cues for movement in pain free range & to slow karyna to maximize postural control. Patient was able to tolerate increased challenge of alternating paddle exercises with no increase in pain. Patient did well post instruction. Patient will benefit from aquatic environment to unload  Bilateral LEs for strengthening, core stabilization & postural awareness.      Joyce is progressing well towards her goals.   Pt prognosis is Fair.     Pt will continue to benefit from skilled aquatic outpatient physical therapy to address the deficits listed in the problem list box on initial evaluation, provide pt/family education and to maximize pt's level of independence in the home and community environment.     Pt's spiritual, cultural and educational needs considered and pt agreeable to plan of care and goals.    Anticipated barriers to physical therapy: history of pathological fractures    Goals:   Short Term Goals: 6 weeks   1. Patient will be independent in HEP & progressions (progressing)  2. Patient will achieve TUG score of 13 sec or less to demonstrate improved mobility (progressing)     Long Term Goals: 12 weeks   1. Patient  will have FOTO limitation score of 46% to demonstrate improved function & decreased pain (progressing)  2. Patient will increase L hip MMT to 4+/5 to demonstrate improved strength (progressing)  3. Patient will be able to ascend/descend 5 steps with use of  single railing & pain level 3/10 or less (progressing)  4. Patient will be able to walk 100ft without AD & without gait deviations (progressing)        Plan     Increase exercises slowly 2/2 history of pathological fractures    Plan of care Certification: 12/5/2019 to 2/27/2020.     Outpatient aquatic Physical Therapy 1-2 times weekly for  12 weeks to include the following interventions: aquatic therapy, manual therapy, patient education, therapeutic exercise, therapeutic activity  Patient may be seen by PTA as part of the rehabilitation team    Cathleen Acosta, PT

## 2020-01-10 ENCOUNTER — CLINICAL SUPPORT (OUTPATIENT)
Dept: REHABILITATION | Facility: HOSPITAL | Age: 60
End: 2020-01-10
Payer: MEDICARE

## 2020-01-10 DIAGNOSIS — R26.2 DIFFICULTY WALKING: ICD-10-CM

## 2020-01-10 PROCEDURE — 97113 AQUATIC THERAPY/EXERCISES: CPT

## 2020-01-12 ENCOUNTER — PATIENT OUTREACH (OUTPATIENT)
Dept: ADMINISTRATIVE | Facility: OTHER | Age: 60
End: 2020-01-12

## 2020-01-13 ENCOUNTER — OFFICE VISIT (OUTPATIENT)
Dept: RHEUMATOLOGY | Facility: CLINIC | Age: 60
End: 2020-01-13
Payer: MEDICARE

## 2020-01-13 VITALS
HEART RATE: 112 BPM | WEIGHT: 160.88 LBS | SYSTOLIC BLOOD PRESSURE: 131 MMHG | DIASTOLIC BLOOD PRESSURE: 92 MMHG | BODY MASS INDEX: 27.46 KG/M2 | HEIGHT: 64 IN

## 2020-01-13 DIAGNOSIS — M06.9 RHEUMATOID ARTHRITIS, INVOLVING UNSPECIFIED SITE, UNSPECIFIED RHEUMATOID FACTOR PRESENCE: ICD-10-CM

## 2020-01-13 PROCEDURE — 3008F BODY MASS INDEX DOCD: CPT | Mod: CPTII,S$GLB,, | Performed by: INTERNAL MEDICINE

## 2020-01-13 PROCEDURE — 3008F PR BODY MASS INDEX (BMI) DOCUMENTED: ICD-10-PCS | Mod: CPTII,S$GLB,, | Performed by: INTERNAL MEDICINE

## 2020-01-13 PROCEDURE — 99214 OFFICE O/P EST MOD 30 MIN: CPT | Mod: S$GLB,,, | Performed by: INTERNAL MEDICINE

## 2020-01-13 PROCEDURE — 99214 PR OFFICE/OUTPT VISIT, EST, LEVL IV, 30-39 MIN: ICD-10-PCS | Mod: S$GLB,,, | Performed by: INTERNAL MEDICINE

## 2020-01-13 PROCEDURE — 99999 PR PBB SHADOW E&M-EST. PATIENT-LVL V: CPT | Mod: PBBFAC,,, | Performed by: INTERNAL MEDICINE

## 2020-01-13 PROCEDURE — 99999 PR PBB SHADOW E&M-EST. PATIENT-LVL V: ICD-10-PCS | Mod: PBBFAC,,, | Performed by: INTERNAL MEDICINE

## 2020-01-13 RX ORDER — LEFLUNOMIDE 20 MG/1
20 TABLET ORAL DAILY
Qty: 90 TABLET | Refills: 0 | Status: SHIPPED | OUTPATIENT
Start: 2020-01-13 | End: 2020-07-08 | Stop reason: SDUPTHER

## 2020-01-13 ASSESSMENT — ROUTINE ASSESSMENT OF PATIENT INDEX DATA (RAPID3)
PATIENT GLOBAL ASSESSMENT SCORE: 5
PSYCHOLOGICAL DISTRESS SCORE: 0
WHEN YOU AWAKENED IN THE MORNING OVER THE LAST WEEK, PLEASE INDICATE THE AMOUNT OF TIME IT TAKES UNTIL YOU ARE AS LIMBER AS YOU WILL BE FOR THE DAY: 24 HRS
MDHAQ FUNCTION SCORE: 0
PAIN SCORE: 6
FATIGUE SCORE: 3
AM STIFFNESS SCORE: 1, YES
TOTAL RAPID3 SCORE: 3.67

## 2020-01-13 ASSESSMENT — SYSTEMIC LUPUS ERYTHEMATOSUS DISEASE ACTIVITY INDEX (SLEDAI): TOTAL_SCORE: 0

## 2020-01-13 NOTE — PROGRESS NOTES
"I have personally taken the history and examined the patient and agree with the resident,s note as stated above   erosive RA discontinued long term methotrexate b/o nausea with po and sc and reduced dose, allergic to sulfa, and had reaction to hydroxychloroquine;   tolerating leflunomide and tolerated tofacitinib but latter not efficacious.  Has failed 3 TNFi(infliximab, etanercept, adalimumab), abatacept, hypogamma with rituximab and gets frequent infections in any event, acute vertigo/dizziness with tocilizumab x2        RA  TJ 6 SJ 0  Pt global 50 ESR 4  CRP 0.2  DAS28 3.04  DTO59-EUV 3.1(both LDA)  CDAI: 12(MDA)  K.sicca" serum gtts four times daily OU. lifitegrast gtts 5% OU, cyclosporine 0.05% gtts Refresh all day OU  Hypogammaglobulinemia: IgM  IgG1 IgG2 subclasses  Osteoporosis/Osteopenia FRAX hip 2.6%  Major 25% DXA 2/28/19  fracturing while on denosumab. Dr. Lane discussed at conference, plan to continue denosumab 60mg sc q 6 months and add daily teriparitide for 2 years, then continue denosumab Indefinitely  Elevated Agatston calcium score of 668 90%tile    sarilumab 200mg sc every 14 days(was off x 6 wks, resumed 2 wks ago)  leflunomide 20mg daily  Prednisone 6 mg daily  denosumab 60mg sc q 6 months  Premarin 0.625mg daily  Premarin vaginal cream, has prevented recurrent UTIs  Teriparatide 20 mcg sc daily x 2 years, start in 3-7 days, after resuming sarilumab  RTC 3 months with standing labs. Consider trial of upadacitinib in future, although tofacitinib was unhelpful.   "

## 2020-01-13 NOTE — PROGRESS NOTES
Subjective:       Patient ID: Joyce Peterson is a 59 y.o. female.    Chief Complaint: No chief complaint on file.    Joyce Peterson returns for 3m follow up today. In the interim, she reports occasional swelling of various joints in her hands which never lasted more than a few days. Today and for the past week she has not had any signs/symptoms of RA flare. Notably, she took a 6 week hiatus from Kevzara recently due to ongoing abx treatment for URI per her ENT. She remains on daily Arava and reports taking 6mg prednisone daily since last Friday. She is due for a Kevzara dose tonight. She was also seen by Endocrine regarding her recurrent fractures, and the plan for dual therapy with Prolia q6m and Forteo daily for 2 years was made. She has not started her Forteo yet as she did not want to restart Kevzara at the same time. She plans on starting this new medication later this week or early next week. She also saw Podiatry who recommended rocker-bottom shoes and insoles inserts.     Her most bothersome joints are currently her right midfoot joints (sees podiatry for this), her left hip (sees orthopedics), and her neck. These are all described as aching pains. Her neck pain recently improved with a massage. Her foot and hip pains come and go, and she is currently participating in Aqua Therapy for exercise and rehab.     Review of Systems   Constitutional: Positive for fatigue (tolerable). Negative for chills and fever.   HENT: Negative for ear pain, mouth sores, nosebleeds (nose sores), sore throat and trouble swallowing.    Eyes: Negative for pain and redness.   Respiratory: Negative for chest tightness, shortness of breath and wheezing.    Cardiovascular: Negative for chest pain and palpitations.   Gastrointestinal: Negative for abdominal pain, nausea and vomiting.   Genitourinary: Negative for dysuria.   Musculoskeletal: Positive for arthralgias, myalgias and neck pain. Negative for back pain and joint  "swelling.   Skin: Negative for rash.   Neurological: Negative for dizziness, weakness, numbness and headaches.   Hematological: Bruises/bleeds easily.         Objective:   BP (!) 131/92   Pulse (!) 112   Ht 5' 3.6" (1.615 m)   Wt 73 kg (160 lb 14.4 oz)   LMP  (LMP Unknown)   BMI 27.97 kg/m²      Physical Exam   Constitutional: She is oriented to person, place, and time and well-developed, well-nourished, and in no distress.   HENT:   Head: Normocephalic and atraumatic.   Nose: Nose normal.   Mouth/Throat: Oropharynx is clear and moist.   Eyes: EOM are normal. Pupils are equal, round, and reactive to light. No scleral icterus.   Neck: Normal range of motion.   Cardiovascular: Normal rate and regular rhythm.    Murmur (systolic flow in aortic area) heard.  Pulmonary/Chest: Effort normal and breath sounds normal. She has no wheezes. She has no rales.   Abdominal: Soft. There is no tenderness.   Lymphadenopathy:     She has no cervical adenopathy.   Neurological: She is alert and oriented to person, place, and time.   Skin: Skin is warm and dry. No rash noted.     Psychiatric: Mood, memory, affect and judgment normal.   Musculoskeletal: Normal range of motion. She exhibits tenderness (diffuse TTP in b/l wrists, shoulders, TMJ, knees) and deformity (diffuse bony hypertrophy of large and small joints of the hands and feet). She exhibits no edema.                  Assessment/Plan   60 y/o F with a history of RA, Fibromyalgia, Osteoprosis, and Osteoarthritis treated with Kevzara, prednisone, Arava, Prolia, and Forteo currently managing comorbidites to balance her management of these ailments.    Restart Kevzara tonight  Continue Arava daily  Continue Prednisone 6mg daily  Continue Prolia q6m (next in March)   Add Forteo next week after restarting Kevzara  Continue daily Premarin  Continue Aqua Therapy and massage  Return to care in 3 months with standing labs or sooner if problems arise    Weston Malin MD  [employer] " PM&R, PGY-1        Rheumatoid arthritis, involving unspecified site, unspecified rheumatoid factor presence  -     leflunomide (ARAVA) 20 MG Tab; Take 1 tablet (20 mg total) by mouth once daily.  Dispense: 90 tablet; Refill: 0    Other orders  -     Pneumococcal Polysaccharide Vaccine (23 Valent) (SQ/IM); Future; Expected date: 01/13/2020  -     sarilumab (KEVZARA) 200 mg/1.14 mL Syrg; Inject 1.14 mLs (200 mg total) into the skin every 14 (fourteen) days.  Dispense: 2.28 mL; Refill: 2       Problem List Items Addressed This Visit     None      Visit Diagnoses     Rheumatoid arthritis, involving unspecified site, unspecified rheumatoid factor presence        Relevant Medications    leflunomide (ARAVA) 20 MG Tab

## 2020-01-14 ENCOUNTER — OFFICE VISIT (OUTPATIENT)
Dept: OTOLARYNGOLOGY | Facility: CLINIC | Age: 60
End: 2020-01-14
Payer: MEDICARE

## 2020-01-14 ENCOUNTER — CLINICAL SUPPORT (OUTPATIENT)
Dept: REHABILITATION | Facility: HOSPITAL | Age: 60
End: 2020-01-14
Payer: MEDICARE

## 2020-01-14 VITALS
BODY MASS INDEX: 26.59 KG/M2 | SYSTOLIC BLOOD PRESSURE: 123 MMHG | DIASTOLIC BLOOD PRESSURE: 66 MMHG | HEIGHT: 64 IN | TEMPERATURE: 99 F | WEIGHT: 155.75 LBS | HEART RATE: 99 BPM

## 2020-01-14 DIAGNOSIS — J32.8 OTHER CHRONIC SINUSITIS: Primary | ICD-10-CM

## 2020-01-14 DIAGNOSIS — J34.3 HYPERTROPHY OF INFERIOR NASAL TURBINATE: ICD-10-CM

## 2020-01-14 DIAGNOSIS — J34.2 NASAL SEPTAL DEVIATION: ICD-10-CM

## 2020-01-14 DIAGNOSIS — R26.2 DIFFICULTY WALKING: ICD-10-CM

## 2020-01-14 PROCEDURE — 97113 AQUATIC THERAPY/EXERCISES: CPT

## 2020-01-14 PROCEDURE — 3008F PR BODY MASS INDEX (BMI) DOCUMENTED: ICD-10-PCS | Mod: CPTII,S$GLB,, | Performed by: OTOLARYNGOLOGY

## 2020-01-14 PROCEDURE — 3008F BODY MASS INDEX DOCD: CPT | Mod: CPTII,S$GLB,, | Performed by: OTOLARYNGOLOGY

## 2020-01-14 PROCEDURE — 99999 PR PBB SHADOW E&M-EST. PATIENT-LVL III: CPT | Mod: PBBFAC,,, | Performed by: OTOLARYNGOLOGY

## 2020-01-14 PROCEDURE — 99214 OFFICE O/P EST MOD 30 MIN: CPT | Mod: 25,S$GLB,, | Performed by: OTOLARYNGOLOGY

## 2020-01-14 PROCEDURE — 99214 PR OFFICE/OUTPT VISIT, EST, LEVL IV, 30-39 MIN: ICD-10-PCS | Mod: 25,S$GLB,, | Performed by: OTOLARYNGOLOGY

## 2020-01-14 PROCEDURE — 99999 PR PBB SHADOW E&M-EST. PATIENT-LVL III: ICD-10-PCS | Mod: PBBFAC,,, | Performed by: OTOLARYNGOLOGY

## 2020-01-14 NOTE — PROGRESS NOTES
Chief Complaint   Patient presents with    Follow-up     Patient states no improvement since last visit.   .    HPI:     Joyce Peterson is a 59 y.o. female who is referred by Dr. Becca Peters MD for evaluation of 2 week history of increased  nasal congestion and postnasal drip. She states that she most recently was treated with doxycycline 100mg PO BID for 7 days without relief. She states that she has had a lifelong history of nasal congestion and allergies.  She states that she had positive allergy testing in the past. She has had immunotherapy in the past.  She notes that she will have 3-4 exacerbations of her nasal symptoms per year that takes 2-3 months to resolve. She also has a history of asthma and finds that her asthma is worse when she is having sinus issues.   She describes difficulty breathing at night. There is bilateral nasal obstruction. She does use sinus rinses or nasal sprays. She has been on  singulair, alavert, albuterol, symbicort, astelin, flonase.  She admits to midface pain and pressure.  She admits to rhinorrhea and postnasal drip. There is not maxillary tooth pain. She  admits to headaches.  She has not had sinus or nasal surgery. There is no history of sinonasal trauma.    Interval HPI 1/14/2020:  Mrs. Peterson follows up today for chronic sinusitis, chronic rhinitis.  She reports that he symptoms are not improved since her last visit 6 weeks ago. She has completed Levaquin 500mg PO daily for 14 days and medrol dose riya.  She remains on Flonase, astelin, and nasal saline rinses.  She reports that she feeels no improvement in her symptoms.   She has continued nasal congestion, post-nasal drip, midfacial pain/pressure.  She has had intermittent headaches in the frontal region.       Past Medical History:   Diagnosis Date    Acid reflux     Allergy     Anemia     Asthma     Coronary artery disease     Degenerative disc disease     Dry eyes     Dry mouth      Gastroparesis     Hyperlipidemia     Lateral meniscus derangement 4/6/2016    Lobular carcinoma in situ     Osteoarthritis     Osteoporosis     Rheumatoid arthritis(714.0)     Umbilical hernia 8/13/2015     Social History     Socioeconomic History    Marital status:      Spouse name: Not on file    Number of children: Not on file    Years of education: Not on file    Highest education level: Not on file   Occupational History    Not on file   Social Needs    Financial resource strain: Not on file    Food insecurity:     Worry: Not on file     Inability: Not on file    Transportation needs:     Medical: Not on file     Non-medical: Not on file   Tobacco Use    Smoking status: Never Smoker    Smokeless tobacco: Never Used   Substance and Sexual Activity    Alcohol use: Yes     Comment: occasionally    Drug use: No    Sexual activity: Yes     Partners: Male     Birth control/protection: Surgical   Lifestyle    Physical activity:     Days per week: Not on file     Minutes per session: Not on file    Stress: Not on file   Relationships    Social connections:     Talks on phone: Not on file     Gets together: Not on file     Attends Sabianism service: Not on file     Active member of club or organization: Not on file     Attends meetings of clubs or organizations: Not on file     Relationship status: Not on file   Other Topics Concern    Are you pregnant or think you may be? Not Asked    Breast-feeding Not Asked   Social History Narrative    Not on file     Past Surgical History:   Procedure Laterality Date    BREAST BIOPSY Left 01/29/2002    core bx    CARPAL TUNNEL RELEASE Right 05/2017    CHOLECYSTECTOMY  2004    COLONOSCOPY      10/11    COLONOSCOPY N/A 6/29/2017    Procedure: COLONOSCOPY;  Surgeon: López Moore MD;  Location: [facility] ([address] [place] [address]);  Service: Endoscopy;  Laterality: N/A;    INTRAMEDULLARY RODDING OF FEMUR Left 5/21/2019    Procedure: INSERTION, INTRAMEDULLARY  ARIEL, FEMUR;  Surgeon: López Duff MD;  Location: [facility] OR 2ND FLR;  Service: Orthopedics;  Laterality: Left;    REPAIR OF TRICEPS TENDON Left 10/17/2018    Procedure: REPAIR, TENDON, TRICEPS left elbow;  Surgeon: Staci Yarbrough MD;  Location: [facility] OR 1ST FLR;  Service: Orthopedics;  Laterality: Left;  Anesthesia: General and regional. PRONE, k-wire , hand pan 1 and pan 2, CALL ARTHREX/Tamera notified 10-12 LO    TONSILLECTOMY      TUBAL LIGATION  2003    UPPER GASTROINTESTINAL ENDOSCOPY      10/11    uterine ablation  2003     Family History   Problem Relation Age of Onset    Cancer Mother         Lung Cancer    Emphysema Mother     Heart attack Mother     Cancer Father         Lung Cancer    Osteoarthritis Father     Lung cancer Father     Skin cancer Father     Heart disease Brother     Heart attack Brother     Osteoarthritis Paternal Aunt     Retinal detachment Maternal Aunt     No Known Problems Sister     No Known Problems Maternal Uncle     No Known Problems Paternal Uncle     No Known Problems Maternal Grandmother     No Known Problems Maternal Grandfather     No Known Problems Paternal Grandmother     No Known Problems Paternal Grandfather     Colon cancer Neg Hx     Esophageal cancer Neg Hx     Stomach cancer Neg Hx     Celiac disease Neg Hx     Diabetes Neg Hx     Thyroid disease Neg Hx     Amblyopia Neg Hx     Blindness Neg Hx     Cataracts Neg Hx     Glaucoma Neg Hx     Hypertension Neg Hx     Macular degeneration Neg Hx     Strabismus Neg Hx     Stroke Neg Hx            Review of Systems  General: negative for chills, fever or weight loss  Psychological: negative for mood changes or depression  Ophthalmic: negative for blurry vision, photophobia or eye pain  ENT: see HPI  Respiratory: no cough, shortness of breath, or wheezing  Cardiovascular: no chest pain or dyspnea on exertion  Gastrointestinal: no abdominal pain, change in bowel habits, or  black/ bloody stools  Musculoskeletal: negative for gait disturbance or muscular weakness  Neurological: no syncope or seizures; no ataxia  Dermatological: negative for puritis,  rash and jaundice  Hematologic/lymphatic: no easy bruising, no new lumps or bumps      Physical Exam:    Vitals:    01/14/20 0912   BP: 123/66   Pulse: 99   Temp: 98.7 °F (37.1 °C)       Constitutional: Well appearing / communicating without difficutly.  NAD.  Eyes: EOM I Bilaterally  Head/Face: Normocephalic.  Negative paranasal sinus pressure/tenderness.  Salivary glands WNL.  House Brackmann I Bilaterally.    Right Ear: Auricle normal appearance. External Auditory Canal within normal limits,TM w/o masses/lesions/perforations. TM mobility noted.   Left Ear: Auricle normal appearance. External Auditory Canal WNL,TM w/o masses/lesions/perforations. TM mobility noted.  Nose: Nasal septal deviation to the left. Inferior Turbinates 3+ bilaterally. No septal perforation. No masses/lesions. External nasal skin appears normal without masses/lesions.  Oral Cavity: Gingiva/lips within normal limits.  Dentition/gingiva healthy appearing. Mucus membranes moist. Floor of mouth soft, no masses palpated. Oral Tongue mobile. Hard Palate appears normal.    Oropharynx: Base of tongue appears normal. No masses/lesions noted. Tonsillar fossa/pharyngeal wall without lesions. Posterior oropharynx WNL.  Soft palate without masses. Midline uvula.   Neck/Lymphatic: No LAD I-VI bilaterally.  No thyromegaly.  No masses noted on exam.    Mirror laryngoscopy/nasopharyngoscopy: Active gag reflex.  Unable to perform.    Neuro/Psychiatric: AOx3.  Normal mood and affect.   Cardiovascular: Normal carotid pulses bilaterally, no increasing jugular venous distention noted at cervical region bilaterally.    Respiratory: Normal respiratory effort, no stridor, no retractions noted.      See separate procedure note for nasal endoscopy.     Diagnostic testing reviewed:  ImmunoCAP  testing reviewed- negative      Assessment:    ICD-10-CM ICD-9-CM    1. Other chronic sinusitis J32.8 473.8 CT Medtronic Sinuses without   2. Hypertrophy of inferior nasal turbinate J34.3 478.0    3. Nasal septal deviation J34.2 470      The primary encounter diagnosis was Other chronic sinusitis. Diagnoses of Hypertrophy of inferior nasal turbinate and Nasal septal deviation were also pertinent to this visit.      Plan:  Orders Placed This Encounter   Procedures    CT Medtronic Sinuses without     Flonase 2 sprays per nostril daily  Astelin 1 spray BID  Start nasal saline rinses BID  Obtain CT scan of the sinuses to assess intraluminal patency.     Rachelle Mcguire MD

## 2020-01-14 NOTE — PROGRESS NOTES
Physical Therapy Daily Treatment Note     Name: Joyce Peterson  Clinic Number: [id number]    Therapy Diagnosis:   Encounter Diagnosis   Name Primary?    Difficulty walking      Physician: Jett Julien III, *    Visit Date: 1/14/2020  Physician Orders: PT Eval and Treat aquatic therapy  Medical Diagnosis from Referral:   M25.551 (ICD-10-CM) - Right hip pain   M25.552 (ICD-10-CM) - Left hip pain         Evaluation Date: 12/5/2019  Authorization Period Expiration: 12/31/2020  Plan of Care Expiration: 2/27/2020  Visit # / Visits authorized: 3/20  Total visits: 4      Time In: 1340  Time Out: 1440  Total Billable Time: 30 minutes    Precautions: osteoporosis with pathological fx L femur, asthma, RA, Sjogrens, R foot metatarsal fx 3-5 patholigical fx    PROCEDURES/IMAGING: IMN L femur 5/21/2019, L  triceps tendon repair 10/17/2018  Subjective     Pt reports: My left leg feels better  She was compliant with home exercise program.  Response to previous treatment: muscle soreness x 1 day  Functional change: none, walks into clinic    Pain: 3/10  Location: bilateral hips, R foot, neck    Objective     Joyce received aquatic therapeutic exercises to develop strength, endurance, ROM, flexibility, posture and core stabilization for 60 minutes including:    WARMUP x 2 laps each  Walk fwd/back/side    STRETCHES 3 x 20sec  Hamstring  Gastroc    LE EX x 20  Mini squat  Heel raise with GS x 10  Hip abd/add  Hip flex/ext- single UE DB support  LAQ with hip flex  HS curls  Lunge forward  Lunge lateral  Step up forward x 10 each LE    UE EX/CORE  x 20  Shoulder flex/ext TA activation APO modified tandem alternating  Shoulder horizontal abd/add TA activation APO modified tandem alternating  Shoulder abd/add TA activation APO modified tandem alternating  Rows orange tubing modified tandem  Shoulder extension orange tubing modified tandem  Chest press orange tubing modified tandem  Shoulder flexion orange tubing modified  tandem    ENDURANCE  Bicycle // bars x 6 min    COOL DOWN x 1 lap each  Walk fwd/back/side      Home Exercises Provided and Patient Education Provided     Education provided:   Role of aquatic therapy  Hydration post therapy      Written Home Exercises Provided: Patient instructed to cont prior HEP.  Exercises were reviewed and Joyce was able to demonstrate them prior to the end of the session.  Joyce demonstrated good  understanding of the education provided.     See EMR under Patient Instructions for exercises provided prior visit 12/5/2019    Assessment     Patient progressing with aquatic therapy & requires minimal cueing for normal respiration with exercises Patient was able to tolerate addition of step ups with no increase in pain.. Patient did well post instruction. Patient will benefit from aquatic environment to unload  Bilateral LEs for strengthening, core stabilization & postural awareness.      Joyce is progressing well towards her goals.   Pt prognosis is Fair.     Pt will continue to benefit from skilled aquatic outpatient physical therapy to address the deficits listed in the problem list box on initial evaluation, provide pt/family education and to maximize pt's level of independence in the home and community environment.     Pt's spiritual, cultural and educational needs considered and pt agreeable to plan of care and goals.    Anticipated barriers to physical therapy: history of pathological fractures    Goals:   Short Term Goals: 6 weeks   1. Patient will be independent in HEP & progressions (progressing)  2. Patient will achieve TUG score of 13 sec or less to demonstrate improved mobility (progressing)     Long Term Goals: 12 weeks   1. Patient  will have FOTO limitation score of 46% to demonstrate improved function & decreased pain (progressing)  2. Patient will increase L hip MMT to 4+/5 to demonstrate improved strength (progressing)  3. Patient will be able to ascend/descend 5 steps with  use of single railing & pain level 3/10 or less (progressing)  4. Patient will be able to walk 100ft without AD & without gait deviations (progressing)        Plan     Increase exercises slowly 2/2 history of pathological fractures    Plan of care Certification: 12/5/2019 to 2/27/2020.     Outpatient aquatic Physical Therapy 1-2 times weekly for  12 weeks to include the following interventions: aquatic therapy, manual therapy, patient education, therapeutic exercise, therapeutic activity  Patient may be seen by PTA as part of the rehabilitation team    Cathleen Acosta, PT

## 2020-01-24 ENCOUNTER — OFFICE VISIT (OUTPATIENT)
Dept: HEMATOLOGY/ONCOLOGY | Facility: CLINIC | Age: 60
End: 2020-01-24
Payer: MEDICARE

## 2020-01-24 ENCOUNTER — HOSPITAL ENCOUNTER (OUTPATIENT)
Dept: RADIOLOGY | Facility: HOSPITAL | Age: 60
Discharge: HOME | End: 2020-01-24
Attending: OTOLARYNGOLOGY
Payer: MEDICARE

## 2020-01-24 VITALS
BODY MASS INDEX: 26.91 KG/M2 | TEMPERATURE: 98 F | OXYGEN SATURATION: 97 % | RESPIRATION RATE: 18 BRPM | WEIGHT: 157.63 LBS | SYSTOLIC BLOOD PRESSURE: 119 MMHG | DIASTOLIC BLOOD PRESSURE: 83 MMHG | HEART RATE: 94 BPM | HEIGHT: 64 IN

## 2020-01-24 DIAGNOSIS — D50.8 OTHER IRON DEFICIENCY ANEMIA: ICD-10-CM

## 2020-01-24 DIAGNOSIS — J32.8 OTHER CHRONIC SINUSITIS: ICD-10-CM

## 2020-01-24 PROBLEM — D50.9 IDA (IRON DEFICIENCY ANEMIA): Status: ACTIVE | Noted: 2020-01-24

## 2020-01-24 PROCEDURE — 99999 PR PBB SHADOW E&M-EST. PATIENT-LVL III: CPT | Mod: PBBFAC,,, | Performed by: NURSE PRACTITIONER

## 2020-01-24 PROCEDURE — 99999 PR PBB SHADOW E&M-EST. PATIENT-LVL III: ICD-10-PCS | Mod: PBBFAC,,, | Performed by: NURSE PRACTITIONER

## 2020-01-24 PROCEDURE — 70486 CT MEDTRONIC SINUSES WITHOUT: ICD-10-PCS | Mod: 26,,, | Performed by: RADIOLOGY

## 2020-01-24 PROCEDURE — 70486 CT MAXILLOFACIAL W/O DYE: CPT | Mod: 26,,, | Performed by: RADIOLOGY

## 2020-01-24 PROCEDURE — 70486 CT MAXILLOFACIAL W/O DYE: CPT | Mod: TC

## 2020-01-24 PROCEDURE — 99213 OFFICE O/P EST LOW 20 MIN: CPT | Mod: S$GLB,,, | Performed by: NURSE PRACTITIONER

## 2020-01-24 PROCEDURE — 3008F PR BODY MASS INDEX (BMI) DOCUMENTED: ICD-10-PCS | Mod: CPTII,S$GLB,, | Performed by: NURSE PRACTITIONER

## 2020-01-24 PROCEDURE — 99213 PR OFFICE/OUTPT VISIT, EST, LEVL III, 20-29 MIN: ICD-10-PCS | Mod: S$GLB,,, | Performed by: NURSE PRACTITIONER

## 2020-01-24 PROCEDURE — 3008F BODY MASS INDEX DOCD: CPT | Mod: CPTII,S$GLB,, | Performed by: NURSE PRACTITIONER

## 2020-01-24 NOTE — Clinical Note
CBC, iron, ferritin, cmp, C-reactive, sed rate, and appt with Dr. Woodson or NP 2nd week of May. Link Dr. Moran's C-reactive, sed rate, and CMP.

## 2020-01-24 NOTE — PROGRESS NOTES
SECTION OF HEMATOLOGY AND BONE MARROW TRANSPLANT  Return  Patient Visit   01/24/2020  Referred by:  No ref. provider found  Referred for: ROBERT    CHIEF COMPLAINT:   No chief complaint on file.      HISTORY OF PRESENT ILLNESS:   59 y.o. female Referred to me November 2017  for ROBERT.  Intolerant to po iron.  Iron studies in march 2017 with severe ID.  History RA followed by Dr. Valverde.  History of gastroparesis followed by Dr. Moore in GI.  Denies fever, chills, nightsweats, bleeding, brusing, lymphadenopathy, signs/symptoms of splenomegaly.   Had upper and lower endoscopy summer 2017 unremarkable.  No VCE done.  Denies any overt GI bleeding or other bleeding.    S/p repeat IV feraheme x 2 January 2018,  may 2019 and oct/nov 2019.  Tolerated well.   Maintained ferritin and hgb since that time. Continues close fu with rheum for RA and osteoperosis.      Interval history: Ms. Peterson presents for follow-up for malabsorption-associated ROBERT. States she is feeling really well today. Continues to travel with her . Continues treatment for RA and osteoarthritis. Denies fatigue, palpitations, chest pain, SOB, and bleeding.       PAST MEDICAL HISTORY:   Past Medical History:   Diagnosis Date    Acid reflux     Allergy     Anemia     Asthma     Coronary artery disease     Degenerative disc disease     Dry eyes     Dry mouth     Gastroparesis     Hyperlipidemia     Lateral meniscus derangement 4/6/2016    Lobular carcinoma in situ     Osteoarthritis     Osteoporosis     Rheumatoid arthritis(714.0)     Umbilical hernia 8/13/2015       PAST SURGICAL HISTORY:   Past Surgical History:   Procedure Laterality Date    BREAST BIOPSY Left 01/29/2002    core bx    CARPAL TUNNEL RELEASE Right 05/2017    CHOLECYSTECTOMY  2004    COLONOSCOPY      10/11    COLONOSCOPY N/A 6/29/2017    Procedure: COLONOSCOPY;  Surgeon: López Moore MD;  Location: [facility] ([address] [place] [address]);  Service: Endoscopy;  Laterality: N/A;     INTRAMEDULLARY RODDING OF FEMUR Left 5/21/2019    Procedure: INSERTION, INTRAMEDULLARY ARIEL, FEMUR;  Surgeon: López Duff MD;  Location: [facility] OR 2ND FLR;  Service: Orthopedics;  Laterality: Left;    REPAIR OF TRICEPS TENDON Left 10/17/2018    Procedure: REPAIR, TENDON, TRICEPS left elbow;  Surgeon: Staci Yarbrough MD;  Location: [facility] OR 1ST FLR;  Service: Orthopedics;  Laterality: Left;  Anesthesia: General and regional. PRONE, k-wire , hand pan 1 and pan 2, CALL ARTHREX/Tamera notified 10-12     TONSILLECTOMY      TUBAL LIGATION  2003    UPPER GASTROINTESTINAL ENDOSCOPY      10/11    uterine ablation  2003       PAST SOCIAL HISTORY:   reports that she has never smoked. She has never used smokeless tobacco. She reports that she drinks alcohol. She reports that she does not use drugs.    FAMILY HISTORY:  Family History   Problem Relation Age of Onset    Cancer Mother         Lung Cancer    Emphysema Mother     Heart attack Mother     Cancer Father         Lung Cancer    Osteoarthritis Father     Lung cancer Father     Skin cancer Father     Heart disease Brother     Heart attack Brother     Osteoarthritis Paternal Aunt     Retinal detachment Maternal Aunt     No Known Problems Sister     No Known Problems Maternal Uncle     No Known Problems Paternal Uncle     No Known Problems Maternal Grandmother     No Known Problems Maternal Grandfather     No Known Problems Paternal Grandmother     No Known Problems Paternal Grandfather     Colon cancer Neg Hx     Esophageal cancer Neg Hx     Stomach cancer Neg Hx     Celiac disease Neg Hx     Diabetes Neg Hx     Thyroid disease Neg Hx     Amblyopia Neg Hx     Blindness Neg Hx     Cataracts Neg Hx     Glaucoma Neg Hx     Hypertension Neg Hx     Macular degeneration Neg Hx     Strabismus Neg Hx     Stroke Neg Hx        CURRENT MEDICATIONS:   Current Outpatient Medications   Medication Sig    albuterol (ACCUNEB) 1.25 mg/3  mL Nebu Take 3 mLs (1.25 mg total) by nebulization every 6 (six) hours as needed. Rescue    albuterol (PROVENTIL HFA) 90 mcg/actuation inhaler Inhale 2 puffs into the lungs every 6 (six) hours as needed. Rescue    amoxicillin (AMOXIL) 500 MG capsule Take 1 capsule by mouth three times a day.    ampicillin (PRINCIPEN) 500 MG capsule TAKE 1 CAPUSLE BY MOUTH EVERY 6 HOURS    aspirin 81 mg Tab Take 81 mg by mouth every morning.     azelastine (ASTELIN) 137 mcg nasal spray 1 spray (137 mcg total) by Nasal route 2 (two) times daily.    benzonatate (TESSALON) 100 MG capsule Take 1 capsule by mouth three times a day    budesonide-formoterol 160-4.5 mcg (SYMBICORT) 160-4.5 mcg/actuation HFAA Inhale 2 puffs into the lungs every 12 (twelve) hours.    calcium citrate-vitamin D (CITRACAL + D) 315-200 mg-unit per tablet Take 1 tablet by mouth 2 (two) times daily.     diazePAM (VALIUM) 10 MG Tab Take 1 tablet (10 mg total) by mouth On call Procedure.    diclofenac sodium (VOLTAREN) 1 % Gel Apply 2 g topically 4 (four) times daily as needed.    doxycycline (MONODOX) 50 MG Cap Take 1 capsule (50 mg total) by mouth 2 (two) times daily.    fluconazole (DIFLUCAN) 100 MG tablet     fluticasone propionate (FLONASE) 50 mcg/actuation nasal spray 2 sprays (100 mcg total) by Each Nostril route once daily.    GUAIFENESIN (MUCINEX ORAL) Take 400 mg by mouth every 4 (four) hours as needed.     INV PROPULSID 10 MG Take 1 tablets (20 mg) by mouth 4 (four) times daily. FOR INVESTIGATIONAL USE ONLY. Protocol CIS-[id number]    leflunomide (ARAVA) 20 MG Tab Take 1 tablet (20 mg total) by mouth once daily.    lifitegrast (XIIDRA) 5 % Dpet Place 1 drop into both eyes 2 (two) times daily.    methylPREDNISolone (MEDROL DOSEPACK) 4 mg tablet use as directed    montelukast (SINGULAIR) 10 mg tablet Take 1 tablet (10 mg total) by mouth nightly.    naproxen (NAPROSYN) 500 MG tablet TAKE 1 TABLET TWICE A DAY AS NEEDED    omeprazole (PRILOSEC)  "40 MG capsule Take 1 capsule (40 mg total) by mouth every morning.    omeprazole-sodium bicarbonate (ZEGERID) 40-1.1 mg-gram per capsule TAKE 1 CAPSULE BY MOUTH EVERY MORNING.    POTASSIUM ORAL Take by mouth once daily.    predniSONE (DELTASONE) 5 MG tablet Take 1 tablet (5 mg total) by mouth once daily.    PREMARIN 0.625 mg tablet Take 0.625 mg by mouth once daily.    PREMARIN vaginal cream PLACE 0.5 GRAM VAGINALLY 3 (THREE) TIMES A WEEK.    progesterone (PROMETRIUM) 100 MG capsule Take 100 mg by mouth every morning. 1 Capsule Oral Every day, morning    promethazine-dextromethorphan (PROMETHAZINE-DM) 6.25-15 mg/5 mL Syrp Take 5 mL by mouth every 6 hours as needed    RESTASIS 0.05 % ophthalmic emulsion PLACE 0.4 MLS (1 DROP TOTAL) INTO BOTH EYES 2 (TWO) TIMES DAILY.    rizatriptan (MAXALT) 10 MG tablet Take 10 mg by mouth as needed for Migraine.     rosuvastatin (CRESTOR) 20 MG tablet Take 1 tablet (20 mg total) by mouth every evening.    sarilumab (KEVZARA) 200 mg/1.14 mL Syrg Inject 1.14 mLs (200 mg total) into the skin every 14 (fourteen) days.    sucralfate (CARAFATE) 1 gram tablet TAKE 1 TABLET (1 G TOTAL) BY MOUTH 4 (FOUR) TIMES DAILY.    syringe with needle (TUBERCULIN SYRINGE) 1 mL 27 x 1/2" Syrg To administer methotrexate 7.5mg sc once a week    teriparatide (FORTEO) 20 mcg/dose - 600 mcg/2.4 mL PnIj Inject 0.08 mLs (20 mcg total) into the skin once daily.    traMADol (ULTRAM) 50 mg tablet Take 1 tablet (50 mg total) by mouth every 24 hours as needed for Pain.    valACYclovir (VALTREX) 1000 MG tablet TAKE 1 TABLET BY MOUTH TWICE A DAY AS NEEDED     No current facility-administered medications for this visit.      Facility-Administered Medications Ordered in Other Visits   Medication    0.9%  NaCl infusion     ALLERGIES:   Review of patient's allergies indicates:   Allergen Reactions    Actemra [tocilizumab] Other (See Comments)     Severe dizziness    Codeine Nausea And Vomiting    Gold " "au 198 Hives and Rash    Hydroxychloroquine Other (See Comments)     Can't remember the reaction      Iodinated contrast- oral and iv dye Other (See Comments)     Other reaction(s): BURNING ALL OVER    Iodine Other (See Comments)     Other reaction(s): BURNING ALL OVER - Iodine dye - Not topical    Sulfa (sulfonamide antibiotics) Other (See Comments)     Can't remember the reaction    Zofran [ondansetron hcl (pf)] Nausea And Vomiting     Pt reports that last time she received zofran she started vomiting again    Methotrexate analogues Nausea Only    Pneumovax 23 [pneumococcal 23-argenis ps vaccine] Other (See Comments)     "sick"             REVIEW OF SYSTEMS:   General ROS: negative  Psychological ROS: negative  Ophthalmic ROS: negative  ENT ROS: negative  Allergy and Immunology ROS: negative  Hematological and Lymphatic ROS: negative  Endocrine ROS: negative  Respiratory ROS: negative  Cardiovascular ROS: negative  Gastrointestinal ROS: see HPI  Genito-Urinary ROS: negative  Musculoskeletal ROS: see HPI  Neurological ROS: negative  Dermatological ROS: negative    PHYSICAL EXAM:   Vitals:    01/24/20 1437   BP: 119/83   Pulse: 94   Resp: 18   Temp: 98.4 °F (36.9 °C)       General - well developed, well nourished, no apparent distress  Head & Face - no sinus tenderness  Eyes - normal conjunctivae and lids   ENT - normal external auditory canals and tympanic membranes bilaterally oropharynx clear,  Normal dentition and gums  Neck - normal thyroid  Chest and Lung - normal respiratory effort, clear to auscultation bilaterally   Cardiovascular - RRR with no MGR, normal S1 and S2; no pedal edema  Abdomen -  soft, nontender, no palpable hepatomegaly or splenomegaly  Lymph - no palpable lymphadenopathy  Extremities - changes of RA   Heme - no bruising, petechiae, pallor  Skin - no rashes or lesions  Psych - appropriate mood and affect      ECOG Performance Status: (foot note - ECOG PS provided by [employer] " [employer]cology Group) 1 - Symptomatic but completely ambulatory    Karnofsky Performance Score:  90%- Able to Carry on Normal Activity: Minor Symptoms of Disease  DATA:   Lab Results   Component Value Date    WBC 4.04 01/24/2020    HGB 13.2 01/24/2020    HCT 42.0 01/24/2020    MCV 96 01/24/2020     01/24/2020     Gran # (ANC)   Date Value Ref Range Status   01/24/2020 2.3 1.8 - 7.7 K/uL Final     Gran%   Date Value Ref Range Status   01/24/2020 56.1 38.0 - 73.0 % Final     CMP  Sodium   Date Value Ref Range Status   01/09/2020 139 136 - 145 mmol/L Final     Potassium   Date Value Ref Range Status   01/09/2020 4.5 3.5 - 5.1 mmol/L Final     Chloride   Date Value Ref Range Status   01/09/2020 103 95 - 110 mmol/L Final     CO2   Date Value Ref Range Status   01/09/2020 27 23 - 29 mmol/L Final     Glucose   Date Value Ref Range Status   01/09/2020 114 (H) 70 - 110 mg/dL Final     BUN, Bld   Date Value Ref Range Status   01/09/2020 22 (H) 7 - 17 mg/dL Final     Creatinine   Date Value Ref Range Status   01/09/2020 0.80 0.50 - 1.40 mg/dL Final     Calcium   Date Value Ref Range Status   01/09/2020 8.8 8.7 - 10.5 mg/dL Final     Total Protein   Date Value Ref Range Status   01/09/2020 6.7 6.0 - 8.4 g/dL Final     Albumin   Date Value Ref Range Status   01/09/2020 4.0 3.5 - 5.2 g/dL Final     Total Bilirubin   Date Value Ref Range Status   01/09/2020 0.6 0.1 - 1.0 mg/dL Final     Comment:     For infants and newborns, interpretation of results should be based  on gestational age, weight and in agreement with clinical  observations.  Premature Infant recommended reference ranges:  Up to 24 hours.............<8.0 mg/dL  Up to 48 hours............<12.0 mg/dL  3-5 days..................<15.0 mg/dL  6-29 days.................<15.0 mg/dL       Alkaline Phosphatase   Date Value Ref Range Status   01/09/2020 83 38 - 126 U/L Final     AST   Date Value Ref Range Status   01/09/2020 43 15 - 46 U/L Final     ALT   Date Value Ref Range  Status   01/09/2020 36 10 - 44 U/L Final     Anion Gap   Date Value Ref Range Status   01/09/2020 9 8 - 16 mmol/L Final     eGFR if    Date Value Ref Range Status   01/09/2020 >60.0 >60 mL/min/1.73 m^2 Final     eGFR if non    Date Value Ref Range Status   01/09/2020 >60.0 >60 mL/min/1.73 m^2 Final     Comment:     Calculation used to obtain the estimated glomerular filtration  rate (eGFR) is the CKD-EPI equation.        Lab Results   Component Value Date    IRON 22 (L) 11/13/2017    TIBC 545 (H) 11/13/2017    FERRITIN 410 (H) 01/24/2020         ASSESSMENT AND PLAN:   Encounter Diagnosis   Name Primary?    Other iron deficiency anemia         ROBERT  Microcytic anemia likely ROBERT  due to malabsorption related to GI issues ; possible anemia of chronic inflammation from RA and meds contributing  Denies any over GI bleeding; Recent summer 2017 upper and lower GI endoscopy negative; VCE deferred but may need if ROBERT persists  Intolerant to po iron   S/p feraheme x 2 jan 2018, may 2019, and oct/nov 2019 with normalization of hgb and ferritin. Tolerated well.  Today hgb is 13.2 and ferritin is 410  Fu with rheum for RA  Recommend serial lab monitoring for further prn iron infusion needs particularly in light of down trending ferritin    Follow-up: CBC, iron, ferritin, cmp, C-reactive, sed rate, and appt with Dr. Woodson or NP 2nd week of May. Link Dr. Moran's C-reactive, sed rate, and CMP.     Bernadette Rust, FNP  Hematology/Oncology/Bone Marrow Transplant

## 2020-01-27 DIAGNOSIS — D50.0 IRON DEFICIENCY ANEMIA DUE TO CHRONIC BLOOD LOSS: Primary | ICD-10-CM

## 2020-01-28 ENCOUNTER — CLINICAL SUPPORT (OUTPATIENT)
Dept: REHABILITATION | Facility: HOSPITAL | Age: 60
End: 2020-01-28
Payer: MEDICARE

## 2020-01-28 DIAGNOSIS — R26.2 DIFFICULTY WALKING: ICD-10-CM

## 2020-01-28 PROCEDURE — 97113 AQUATIC THERAPY/EXERCISES: CPT

## 2020-01-28 NOTE — PROGRESS NOTES
Subjective:       Patient ID: Joyce Peterson is a 59 y.o. female.    Chief Complaint: Neck Pain    Patient is a 59 y.o.female who presents today for two complaints.      She has multiple neck nodules. She was taking her xmas stuff down. One of the boxes slammed into the left side of her neck. This occurred three weeks or so ago. The area is now tender but it is intermittent.      She also notes left sided rib/ thoracic back pain for three months or so. Pain is only bothersome when she is lying on that side in bed. She has to swap positions due to the discomfort.    Review of Systems   Constitutional: Negative for appetite change, chills, diaphoresis and fever.   HENT: Negative for congestion, ear discharge, ear pain, postnasal drip, tinnitus, trouble swallowing and voice change.    Eyes: Negative for discharge, redness and itching.   Respiratory: Negative for cough, chest tightness, shortness of breath and wheezing.    Cardiovascular: Negative for chest pain, palpitations and leg swelling.   Gastrointestinal: Negative for abdominal pain, constipation, diarrhea, nausea and vomiting.   Endocrine: Negative for cold intolerance and heat intolerance.   Genitourinary: Negative for difficulty urinating, flank pain, hematuria and urgency.   Musculoskeletal: Positive for arthralgias and neck pain. Negative for gait problem, myalgias and neck stiffness.   Skin: Negative for color change and rash.   Neurological: Negative for dizziness, seizures, syncope and headaches.   Hematological: Negative for adenopathy.   Psychiatric/Behavioral: Negative for agitation, behavioral problems, confusion and sleep disturbance.       Objective:      Physical Exam   Constitutional: She is oriented to person, place, and time. She appears well-developed and well-nourished. No distress.   HENT:   Head: Normocephalic and atraumatic.   Right Ear: External ear normal.   Left Ear: External ear normal.   Nose: Nose normal.   Mouth/Throat:  Oropharynx is clear and moist. No oropharyngeal exudate.   Eyes: Pupils are equal, round, and reactive to light. Conjunctivae and EOM are normal. Right eye exhibits no discharge. Left eye exhibits no discharge. No scleral icterus.   Neck: Neck supple. No JVD present. No tracheal deviation present. No thyromegaly present.       Cardiovascular: Normal rate, normal heart sounds and intact distal pulses. Exam reveals no gallop and no friction rub.   No murmur heard.  Pulmonary/Chest: Effort normal and breath sounds normal. No stridor. No respiratory distress. She has no wheezes. She has no rales. She exhibits no tenderness.   Abdominal: Soft. Bowel sounds are normal. She exhibits no distension. There is no tenderness. There is no rebound.   Musculoskeletal: She exhibits no edema.        Thoracic back: She exhibits tenderness.   Lymphadenopathy:     She has no cervical adenopathy.   Neurological: She is alert and oriented to person, place, and time.   Skin: Skin is warm and dry. No rash noted. She is not diaphoretic. No erythema.   Psychiatric: She has a normal mood and affect. Her behavior is normal.   Nursing note and vitals reviewed.      Assessment and Plan:       1. Anterior neck pain    - CV Ultrasound Bilateral Doppler Carotid; Future  - US Soft Tissue Head Neck Thyroid; Future    2. Rib pain on left side    - X-Ray Ribs 3 Views Bilateral; Future    3. Chronic left-sided thoracic back pain  - X-Ray Thoracic Spine AP Lateral; Future          No follow-ups on file.

## 2020-01-28 NOTE — PROGRESS NOTES
"Physical Therapy Daily Treatment Note     Name: Joyce GUALLPA [place]  Clinic Number: [id number]    Therapy Diagnosis:   Encounter Diagnosis   Name Primary?    Difficulty walking      Physician: Jett Julien III, *    Visit Date: 1/28/2020  Physician Orders: PT Eval and Treat aquatic therapy  Medical Diagnosis from Referral:   M25.551 (ICD-10-CM) - Right hip pain   M25.552 (ICD-10-CM) - Left hip pain         Evaluation Date: 12/5/2019  Authorization Period Expiration: 12/31/2020  Plan of Care Expiration: 2/27/2020  Visit # / Visits authorized: 4/20  Total visits: 5      Time In: 1245  Time Out: 1345  Total Billable Time: 45 minutes    Precautions: osteoporosis with pathological fx L femur, asthma, RA, Sjogrens, R foot metatarsal fx 3-5 patholigical fx    PROCEDURES/IMAGING: IMN L femur 5/21/2019, L  triceps tendon repair 10/17/2018  Subjective     Pt reports: "I feel ok but I have some neck & thigh pain; "I've been riding the stationary bike @ home"  She was compliant with home exercise program.  Response to previous treatment: muscle soreness x 1 day  Functional change: easier going up steps @ home    Pain: 2/10  Location: neck & thighs    Objective     Joyce received aquatic therapeutic exercises to develop strength, endurance, ROM, flexibility, posture and core stabilization for 60 minutes including:    WARMUP x 2 laps each  Walk fwd/back/side    STRETCHES 3 x 20sec  Hamstring  Gastroc    LE EX x 20  Mini squat  Heel raise with GS x 10  Hip abd/add  Hip flex/ext  LAQ with hip flex  HS curls  Fire hydrants  Lunge forward  Lunge lateral  Step up forward x 12 each LE  Step up lateral x 12 each LE    UE EX/CORE  x 20  Shoulder flex/ext TA activation lite DB modified tandem alternating  Shoulder horizontal abd/add TA activation lite DB modified tandem alternating  Shoulder abd/add TA activation lite DB modified tandem alternating  Rows orange tubing modified tandem stance  Shoulder extension orange tubing modified " tandem stance  Chest press orange tubing modified tandem stance  Shoulder flexion orange tubing modified tandem stance  Reverse crunch // bars    ENDURANCE  Bicycle // bars x 6 min    COOL DOWN x 1 lap each  Walk fwd/back/side      Home Exercises Provided and Patient Education Provided     Education provided:   Role of aquatic therapy  Hydration post therapy      Written Home Exercises Provided: Patient instructed to cont prior HEP.  Exercises were reviewed and Joyce was able to demonstrate them prior to the end of the session.  Joyce demonstrated good  understanding of the education provided.     See EMR under Patient Instructions for exercises provided prior visit 12/5/2019    Assessment     Patient progressing with aquatic therapy & requires minimal cueing for normal respiration with exercises Patient was able to tolerate addition of lateral step ups with no increase in pain.. Patient did well post instruction. Patient will benefit from aquatic environment to unload  Bilateral LEs for strengthening, core stabilization & postural awareness.      Joyce is progressing well towards her goals.   Pt prognosis is Fair.     Pt will continue to benefit from skilled aquatic outpatient physical therapy to address the deficits listed in the problem list box on initial evaluation, provide pt/family education and to maximize pt's level of independence in the home and community environment.     Pt's spiritual, cultural and educational needs considered and pt agreeable to plan of care and goals.    Anticipated barriers to physical therapy: history of pathological fractures    Goals:   Short Term Goals: 6 weeks   1. Patient will be independent in HEP & progressions (progressing)  2. Patient will achieve TUG score of 13 sec or less to demonstrate improved mobility (progressing)     Long Term Goals: 12 weeks   1. Patient  will have FOTO limitation score of 46% to demonstrate improved function & decreased pain (progressing)  2.  Patient will increase L hip MMT to 4+/5 to demonstrate improved strength (progressing)  3. Patient will be able to ascend/descend 5 steps with use of single railing & pain level 3/10 or less (progressing)  4. Patient will be able to walk 100ft without AD & without gait deviations (progressing)        Plan     Increase exercises slowly 2/2 history of pathological fractures; increase Tubing resistance    Plan of care Certification: 12/5/2019 to 2/27/2020.     Outpatient aquatic Physical Therapy 1-2 times weekly for  12 weeks to include the following interventions: aquatic therapy, manual therapy, patient education, therapeutic exercise, therapeutic activity  Patient may be seen by PTA as part of the rehabilitation team    Cathleen Acosta, PT

## 2020-01-29 ENCOUNTER — TELEPHONE (OUTPATIENT)
Dept: GASTROENTEROLOGY | Facility: CLINIC | Age: 60
End: 2020-01-29

## 2020-01-29 DIAGNOSIS — K31.84 GASTROPARESIS: Primary | ICD-10-CM

## 2020-01-29 NOTE — TELEPHONE ENCOUNTER
----- Message from Shanice Fink sent at 1/29/2020 11:16 AM CST -----  Contact: pt [phone]  Pt states that she needs to schedule some tests before next appt.Please call

## 2020-01-30 ENCOUNTER — HOSPITAL ENCOUNTER (OUTPATIENT)
Dept: RADIOLOGY | Facility: HOSPITAL | Age: 60
Discharge: HOME | End: 2020-01-30
Attending: INTERNAL MEDICINE
Payer: MEDICARE

## 2020-01-30 ENCOUNTER — CLINICAL SUPPORT (OUTPATIENT)
Dept: CARDIOLOGY | Facility: CLINIC | Age: 60
End: 2020-01-30
Attending: INTERNAL MEDICINE
Payer: MEDICARE

## 2020-01-30 ENCOUNTER — PATIENT MESSAGE (OUTPATIENT)
Dept: INTERNAL MEDICINE | Facility: CLINIC | Age: 60
End: 2020-01-30

## 2020-01-30 ENCOUNTER — OFFICE VISIT (OUTPATIENT)
Dept: INTERNAL MEDICINE | Facility: CLINIC | Age: 60
End: 2020-01-30
Payer: MEDICARE

## 2020-01-30 VITALS
RESPIRATION RATE: 16 BRPM | SYSTOLIC BLOOD PRESSURE: 124 MMHG | HEIGHT: 61 IN | BODY MASS INDEX: 29.68 KG/M2 | DIASTOLIC BLOOD PRESSURE: 76 MMHG | HEART RATE: 98 BPM | TEMPERATURE: 98 F | WEIGHT: 157.19 LBS

## 2020-01-30 DIAGNOSIS — G89.29 CHRONIC LEFT-SIDED THORACIC BACK PAIN: ICD-10-CM

## 2020-01-30 DIAGNOSIS — R07.81 RIB PAIN ON LEFT SIDE: ICD-10-CM

## 2020-01-30 DIAGNOSIS — M54.2 ANTERIOR NECK PAIN: ICD-10-CM

## 2020-01-30 DIAGNOSIS — M54.6 CHRONIC LEFT-SIDED THORACIC BACK PAIN: ICD-10-CM

## 2020-01-30 DIAGNOSIS — M54.2 ANTERIOR NECK PAIN: Primary | ICD-10-CM

## 2020-01-30 LAB
LEFT ARM DIASTOLIC BLOOD PRESSURE: 80 MMHG
LEFT ARM SYSTOLIC BLOOD PRESSURE: 130 MMHG
LEFT CBA DIAS: 23 CM/S
LEFT CBA SYS: 73 CM/S
LEFT CCA DIST DIAS: 16 CM/S
LEFT CCA DIST SYS: 70 CM/S
LEFT CCA MID DIAS: 23 CM/S
LEFT CCA MID SYS: 79 CM/S
LEFT CCA PROX DIAS: 20 CM/S
LEFT CCA PROX SYS: 80 CM/S
LEFT ECA DIAS: 17 CM/S
LEFT ECA SYS: 99 CM/S
LEFT ICA DIST DIAS: 37 CM/S
LEFT ICA DIST SYS: 90 CM/S
LEFT ICA MID DIAS: 34 CM/S
LEFT ICA MID SYS: 86 CM/S
LEFT ICA PROX DIAS: 12 CM/S
LEFT ICA PROX SYS: 50 CM/S
LEFT VERTEBRAL DIAS: 13 CM/S
LEFT VERTEBRAL SYS: 45 CM/S
OHS CV CAROTID RIGHT ICA EDV HIGHEST: 34
OHS CV CAROTID ULTRASOUND LEFT ICA/CCA RATIO: 1.13
OHS CV CAROTID ULTRASOUND RIGHT ICA/CCA RATIO: 0.96
OHS CV PV CAROTID LEFT HIGHEST CCA: 80
OHS CV PV CAROTID LEFT HIGHEST ICA: 90
OHS CV PV CAROTID RIGHT HIGHEST CCA: 94
OHS CV PV CAROTID RIGHT HIGHEST ICA: 90
OHS CV US CAROTID LEFT HIGHEST EDV: 37
RIGHT ARM DIASTOLIC BLOOD PRESSURE: 80 MMHG
RIGHT ARM SYSTOLIC BLOOD PRESSURE: 125 MMHG
RIGHT CBA DIAS: 23 CM/S
RIGHT CBA SYS: 79 CM/S
RIGHT CCA DIST DIAS: 26 CM/S
RIGHT CCA DIST SYS: 85 CM/S
RIGHT CCA MID DIAS: 31 CM/S
RIGHT CCA MID SYS: 90 CM/S
RIGHT CCA PROX DIAS: 25 CM/S
RIGHT CCA PROX SYS: 94 CM/S
RIGHT ECA DIAS: 21 CM/S
RIGHT ECA SYS: 109 CM/S
RIGHT ICA DIST DIAS: 34 CM/S
RIGHT ICA DIST SYS: 90 CM/S
RIGHT ICA MID DIAS: 28 CM/S
RIGHT ICA MID SYS: 72 CM/S
RIGHT ICA PROX DIAS: 17 CM/S
RIGHT ICA PROX SYS: 57 CM/S
RIGHT VERTEBRAL DIAS: 12 CM/S
RIGHT VERTEBRAL SYS: 56 CM/S

## 2020-01-30 PROCEDURE — 99214 OFFICE O/P EST MOD 30 MIN: CPT | Mod: S$GLB,,, | Performed by: INTERNAL MEDICINE

## 2020-01-30 PROCEDURE — 3008F BODY MASS INDEX DOCD: CPT | Mod: CPTII,S$GLB,, | Performed by: INTERNAL MEDICINE

## 2020-01-30 PROCEDURE — 99999 PR PBB SHADOW E&M-EST. PATIENT-LVL III: CPT | Mod: PBBFAC,,, | Performed by: INTERNAL MEDICINE

## 2020-01-30 PROCEDURE — 93880 EXTRACRANIAL BILAT STUDY: CPT | Mod: S$GLB,,, | Performed by: INTERNAL MEDICINE

## 2020-01-30 PROCEDURE — 3008F PR BODY MASS INDEX (BMI) DOCUMENTED: ICD-10-PCS | Mod: CPTII,S$GLB,, | Performed by: INTERNAL MEDICINE

## 2020-01-30 PROCEDURE — 99999 PR PBB SHADOW E&M-EST. PATIENT-LVL III: ICD-10-PCS | Mod: PBBFAC,,, | Performed by: INTERNAL MEDICINE

## 2020-01-30 PROCEDURE — 71110 XR RIBS 3 VIEWS BILATERAL: ICD-10-PCS | Mod: 26,,, | Performed by: RADIOLOGY

## 2020-01-30 PROCEDURE — 72070 X-RAY EXAM THORAC SPINE 2VWS: CPT | Mod: 26,,, | Performed by: RADIOLOGY

## 2020-01-30 PROCEDURE — 93880 CV US DOPPLER CAROTID (CUPID ONLY): ICD-10-PCS | Mod: S$GLB,,, | Performed by: INTERNAL MEDICINE

## 2020-01-30 PROCEDURE — 72070 X-RAY EXAM THORAC SPINE 2VWS: CPT | Mod: TC,PO

## 2020-01-30 PROCEDURE — 71110 X-RAY EXAM RIBS BIL 3 VIEWS: CPT | Mod: TC,PO

## 2020-01-30 PROCEDURE — 99214 PR OFFICE/OUTPT VISIT, EST, LEVL IV, 30-39 MIN: ICD-10-PCS | Mod: S$GLB,,, | Performed by: INTERNAL MEDICINE

## 2020-01-30 PROCEDURE — 72070 XR THORACIC SPINE AP LATERAL: ICD-10-PCS | Mod: 26,,, | Performed by: RADIOLOGY

## 2020-01-30 PROCEDURE — 71110 X-RAY EXAM RIBS BIL 3 VIEWS: CPT | Mod: 26,,, | Performed by: RADIOLOGY

## 2020-01-31 ENCOUNTER — PATIENT OUTREACH (OUTPATIENT)
Dept: ADMINISTRATIVE | Facility: OTHER | Age: 60
End: 2020-01-31

## 2020-02-01 DIAGNOSIS — M06.9 RHEUMATOID ARTHRITIS INVOLVING MULTIPLE SITES, UNSPECIFIED RHEUMATOID FACTOR PRESENCE: ICD-10-CM

## 2020-02-01 DIAGNOSIS — M21.622 TAILOR'S BUNION OF BOTH FEET: ICD-10-CM

## 2020-02-01 DIAGNOSIS — M21.619 BUNION: ICD-10-CM

## 2020-02-01 DIAGNOSIS — M21.621 TAILOR'S BUNION OF BOTH FEET: ICD-10-CM

## 2020-02-03 ENCOUNTER — TELEPHONE (OUTPATIENT)
Dept: PHARMACY | Facility: CLINIC | Age: 60
End: 2020-02-03

## 2020-02-03 ENCOUNTER — OFFICE VISIT (OUTPATIENT)
Dept: OPTOMETRY | Facility: CLINIC | Age: 60
End: 2020-02-03
Payer: MEDICARE

## 2020-02-03 DIAGNOSIS — H52.4 PRESBYOPIA: ICD-10-CM

## 2020-02-03 DIAGNOSIS — Z46.0 ENCOUNTER FOR FITTING OR ADJUSTMENT OF SPECTACLES OR CONTACT LENSES: Primary | ICD-10-CM

## 2020-02-03 DIAGNOSIS — Z13.5 SCREENING FOR EYE CONDITION: ICD-10-CM

## 2020-02-03 DIAGNOSIS — H52.203 HYPEROPIA OF BOTH EYES WITH ASTIGMATISM: ICD-10-CM

## 2020-02-03 DIAGNOSIS — H04.123 DRY EYES, BILATERAL: Primary | ICD-10-CM

## 2020-02-03 DIAGNOSIS — H53.001 AMBLYOPIA EX ANOPSIA OF RIGHT EYE: ICD-10-CM

## 2020-02-03 DIAGNOSIS — H25.13 NUCLEAR SCLEROSIS OF BOTH EYES: ICD-10-CM

## 2020-02-03 DIAGNOSIS — H52.03 HYPEROPIA OF BOTH EYES WITH ASTIGMATISM: ICD-10-CM

## 2020-02-03 PROCEDURE — 92015 DETERMINE REFRACTIVE STATE: CPT | Mod: S$GLB,,, | Performed by: OPTOMETRIST

## 2020-02-03 PROCEDURE — 99999 PR PBB SHADOW E&M-EST. PATIENT-LVL III: CPT | Mod: PBBFAC,,, | Performed by: OPTOMETRIST

## 2020-02-03 PROCEDURE — 92014 COMPRE OPH EXAM EST PT 1/>: CPT | Mod: S$GLB,,, | Performed by: OPTOMETRIST

## 2020-02-03 PROCEDURE — 92499 UNLISTED OPH SVC/PROCEDURE: CPT | Mod: ,,, | Performed by: OPTOMETRIST

## 2020-02-03 PROCEDURE — 99499 NO LOS: ICD-10-PCS | Mod: S$GLB,,, | Performed by: OPTOMETRIST

## 2020-02-03 PROCEDURE — 92015 PR REFRACTION: ICD-10-PCS | Mod: S$GLB,,, | Performed by: OPTOMETRIST

## 2020-02-03 PROCEDURE — 99499 UNLISTED E&M SERVICE: CPT | Mod: S$GLB,,, | Performed by: OPTOMETRIST

## 2020-02-03 PROCEDURE — 92499 PR CONTACT LENS F/U LEV 1: ICD-10-PCS | Mod: ,,, | Performed by: OPTOMETRIST

## 2020-02-03 PROCEDURE — 92014 PR EYE EXAM, EST PATIENT,COMPREHESV: ICD-10-PCS | Mod: S$GLB,,, | Performed by: OPTOMETRIST

## 2020-02-03 PROCEDURE — 99999 PR PBB SHADOW E&M-EST. PATIENT-LVL III: ICD-10-PCS | Mod: PBBFAC,,, | Performed by: OPTOMETRIST

## 2020-02-03 RX ORDER — DICLOFENAC SODIUM 10 MG/G
2 GEL TOPICAL 4 TIMES DAILY PRN
Qty: 500 G | Refills: 5 | Status: SHIPPED | OUTPATIENT
Start: 2020-02-03 | End: 2020-08-19

## 2020-02-03 RX ORDER — AZITHROMYCIN 250 MG/1
TABLET, FILM COATED ORAL
COMMUNITY
Start: 2020-02-01 | End: 2020-02-28

## 2020-02-03 RX ORDER — NITROFURANTOIN 25; 75 MG/1; MG/1
CAPSULE ORAL
COMMUNITY
Start: 2020-02-01 | End: 2020-05-25

## 2020-02-03 NOTE — PROGRESS NOTES
"HPI     Dry Eye      Additional comments: General eye examination and refraction.   Notes overall  VA changes               Comments     Patient's age: 59 y.o. [place]  Occupation: Disable   Approximate date of last eye examination:  11/29/2019  Name of last eye doctor seen:   City/State: [place]  Wears glasses? Yes      If yes, wears  Full-time or part-time?  Full  time   Present glasses are: Bifocal, SV Distance, SV Reading?  Progressive lens  Approximate age of present glasses:  2+yrs   Got new glasses following last exam, or subsequently?:  No    Any problem with VA with glasses?  Overall changes with vision not sharp   Wears CLs?:  Yes            If yes:              Type of CL worn:  1 Day Acuvue Moist                                            OD 8.5/ +4.50 sphere                                             OS 8.5/ +4.00 sphere              Wears full-time or part-time:  Part time                Sleeps with contact lenses:  No                CL Solution used:                 How often replace CLs:  Dailies               Any problem with VA with CLs?  Yes, overall changes               Has patient read and signed the contact lens fee information   document? Yes   Headaches?  Intermittent   Eye pain/discomfort?  Dryness                                                                                      Flashes?  No   Floaters?  No   Diplopia/Double vision?  No   Patient's Ocular History:         Any eye surgeries? No          Any eye injury?  No          Any treatment for eye disease?  No   Family history of eye disease?  No   Significant patient medical history:         1. Diabetes?  no       If yes, IDDM or NIDDM?   n/a               2. HBP?  No               3. Other (describe):  No    ! OTC eyedrops currently using:     ! Prescription eye meds currently using:           Serum tears (Dr. Hardy)  QID OU  "every time I pass the   refrigerator"           Restasis OU BID            Refresh all day OU      " "      Xiidra BID OU   ! Any history of allergy/adverse reaction to any eye meds used   previously?  No     ! Any history of allergy/adverse reaction to eyedrops used during prior   eye exam(s)? no    ! Any history of allergy/adverse reaction to Novacaine or similar meds?   no   ! Any history of allergy/adverse reaction to Epinephrine or similar meds?   no    ! Patient okay with use of anesthetic eyedrops to check eye pressure?    yes        ! Patient okay with use of eyedrops to dilate pupils today?  yes   !  Allergies/Medications/Medical History/Family History reviewed today?    yes      PD =   62/59  Desired reading distance =  17.25"                                                                       Last edited by Patrice Jurado, OD on 2/3/2020  3:45 PM. (History)            Assessment /Plan     For exam results, see Encounter Report.    1. Dry eyes, bilateral     2. Nuclear sclerosis of both eyes     3. Hyperopia of both eyes with astigmatism     4. Presbyopia     5. Amblyopia ex anopsia of right eye     6. Screening for eye condition                  History of Sjogren's syndrome.   Persistent dry eye symptoms, even with current treatment.  Continue Restasis in both eyes - one drop two times per day in both eyes.  Continue autologuus tears in both eyes four to six times per day.  Continue Xiidra (in addition to Restasis) two times per day.      Otherwise, ocular health appears good in both eyes.    Hyperopia with astigmatism in each eye, with better best-corrected VA in the left eye than in the right eye.  Mild amblyopia in the right eye.  Presbyopia consistent with age.  New spectacle lens Rx issued     Okay to continue part-time/occasional wear with 1 Day Acuvue Moist SCLs.  .  New CL Rx issued.  Use OTC reading glasses over CLs as needed.    Recheck in one year - or prior, if any problems noted in the interim.       "

## 2020-02-03 NOTE — PROGRESS NOTES
Assessment /Plan     For exam results, see Encounter Report.    1. Encounter for fitting or adjustment of spectacles or contact lenses                    History of Sjogren's syndrome.   Persistent dry eye symptoms, even with current treatment.  Continue Restasis in both eyes - one drop two times per day in both eyes.  Continue autologuus tears in both eyes four to six times per day.  Continue Xiidra (in addition to Restasis) two times per day.      Otherwise, ocular health appears good in both eyes.    Hyperopia with astigmatism in each eye, with better best-corrected VA in the left eye than in the right eye.  Mild amblyopia in the right eye.  Presbyopia consistent with age.  New spectacle lens Rx issued     Okay to continue part-time/occasional wear with 1 Day Acuvue Moist SCLs.  .  New CL Rx issued.  Use OTC reading glasses over CLs as needed.    Recheck in one year - or prior, if any problems noted in the interim.

## 2020-02-03 NOTE — PATIENT INSTRUCTIONS
History of Sjogren's syndrome.   Persistent dry eye symptoms, even with current treatment.  Continue Restasis in both eyes - one drop two times per day in both eyes.  Continue autologuus tears in both eyes four to six times per day.  Continue Xiidra (in addition to Restasis) two times per day.      Otherwise, ocular health appears good in both eyes.    Hyperopia with astigmatism in each eye, with better best-corrected VA in the left eye than in the right eye.  Mild amblyopia in the right eye.  Presbyopia consistent with age.  New spectacle lens Rx issued     Okay to continue part-time/occasional wear with 1 Day Acuvue Moist SCLs.  .  New CL Rx issued.  Use OTC reading glasses over CLs as needed.    Recheck in one year - or prior, if any problems noted in the interim.

## 2020-02-04 ENCOUNTER — HOSPITAL ENCOUNTER (OUTPATIENT)
Dept: RADIOLOGY | Facility: HOSPITAL | Age: 60
Discharge: HOME | End: 2020-02-04
Attending: INTERNAL MEDICINE
Payer: MEDICARE

## 2020-02-04 ENCOUNTER — CLINICAL SUPPORT (OUTPATIENT)
Dept: REHABILITATION | Facility: HOSPITAL | Age: 60
End: 2020-02-04
Payer: MEDICARE

## 2020-02-04 ENCOUNTER — TELEPHONE (OUTPATIENT)
Dept: PHARMACY | Facility: CLINIC | Age: 60
End: 2020-02-04

## 2020-02-04 DIAGNOSIS — R26.2 DIFFICULTY WALKING: ICD-10-CM

## 2020-02-04 DIAGNOSIS — M54.2 ANTERIOR NECK PAIN: ICD-10-CM

## 2020-02-04 PROCEDURE — 76536 US SOFT TISSUE HEAD NECK THYROID: ICD-10-PCS | Mod: 26,,, | Performed by: RADIOLOGY

## 2020-02-04 PROCEDURE — 76536 US EXAM OF HEAD AND NECK: CPT | Mod: 26,,, | Performed by: RADIOLOGY

## 2020-02-04 PROCEDURE — 76536 US EXAM OF HEAD AND NECK: CPT | Mod: TC

## 2020-02-04 PROCEDURE — 97113 AQUATIC THERAPY/EXERCISES: CPT

## 2020-02-04 NOTE — PROGRESS NOTES
"Physical Therapy Daily Treatment Note     Name: Joyce Wuard  Clinic Number: [id number]    Therapy Diagnosis:   Encounter Diagnosis   Name Primary?    Difficulty walking      Physician: Jett Julien III, *    Visit Date: 2/4/2020  Physician Orders: PT Eval and Treat aquatic therapy  Medical Diagnosis from Referral:   M25.551 (ICD-10-CM) - Right hip pain   M25.552 (ICD-10-CM) - Left hip pain         Evaluation Date: 12/5/2019  Authorization Period Expiration: 12/31/2020  Plan of Care Expiration: 2/27/2020  Visit # / Visits authorized: 5/20  Total visits: 6      Time In: 1500  Time Out: 1600  Total Billable Time: 30 minutes    Precautions: osteoporosis with pathological fx L femur, asthma, RA, Sjogrens, R foot metatarsal fx 3-5 patholigical fx    PROCEDURES/IMAGING: IMN L femur 5/21/2019, L  triceps tendon repair 10/17/2018  Subjective     Pt reports: "I was sore after last time in my legs"  She was compliant with home exercise program.  Response to previous treatment: muscle soreness x 1 day  Functional change: easier going up steps @ home    Pain: 2/10  Location: neck & thighs    Objective     Joyce received aquatic therapeutic exercises to develop strength, endurance, ROM, flexibility, posture and core stabilization for 60 minutes including:    WARMUP x 2 laps each  Walk fwd/back/side    STRETCHES 3 x 20sec  Hamstring  Gastroc    LE EX x 20  Mini squat  Heel raise with GS x 10  Hip abd/add  Hip flex/ext  LAQ with hip flex  HS curls  Fire hydrants  Lunge forward  Lunge lateral with chest press small red kickboard  Step up/over forward Box x 12 each LE  Step up/over lateral x 12 each LE    UE EX/CORE  x 20  Shoulder flex/ext TA activation lite DB modified tandem alternating  Shoulder horizontal abd/add TA activation lite DB modified tandem alternating  Shoulder abd/add TA activation lite DB modified tandem alternating  Rows lime tubing modified tandem stance  Shoulder extension lime tubing modified tandem " stance  Chest press lime tubing modified tandem stance  Shoulder flexion lime tubing modified tandem stance  Reverse crunch // bars    ENDURANCE  Bicycle // bars x 6 min    COOL DOWN x 1 lap each  Walk fwd/back/side      Home Exercises Provided and Patient Education Provided     Education provided:   Role of aquatic therapy  Hydration post therapy      Written Home Exercises Provided: Patient instructed to cont prior HEP.  Exercises were reviewed and Joyce was able to demonstrate them prior to the end of the session.  Joyce demonstrated good  understanding of the education provided.     See EMR under Patient Instructions for exercises provided prior visit 12/5/2019    Assessment     Patient progressing with aquatic therapy & requires minimal cueing for TA activation with UE dumbbell exercises in modified tandem position. Patient was able to tolerate addition of  step ups on box & leg press with no increase in pain.    Joyce is progressing well towards her goals.   Pt prognosis is Fair.     Pt will continue to benefit from skilled aquatic outpatient physical therapy to address the deficits listed in the problem list box on initial evaluation, provide pt/family education and to maximize pt's level of independence in the home and community environment.     Pt's spiritual, cultural and educational needs considered and pt agreeable to plan of care and goals.    Anticipated barriers to physical therapy: history of pathological fractures    Goals:   Short Term Goals: 6 weeks   1. Patient will be independent in HEP & progressions (progressing)  2. Patient will achieve TUG score of 13 sec or less to demonstrate improved mobility (progressing)     Long Term Goals: 12 weeks   1. Patient  will have FOTO limitation score of 46% to demonstrate improved function & decreased pain (progressing)  2. Patient will increase L hip MMT to 4+/5 to demonstrate improved strength (progressing)  3. Patient will be able to ascend/descend 5  steps with use of single railing & pain level 3/10 or less (progressing)  4. Patient will be able to walk 100ft without AD & without gait deviations (progressing)        Plan     Increase exercises slowly 2/2 history of pathological fractures    Plan of care Certification: 12/5/2019 to 2/27/2020.     Outpatient aquatic Physical Therapy 1-2 times weekly for  12 weeks to include the following interventions: aquatic therapy, manual therapy, patient education, therapeutic exercise, therapeutic activity  Patient may be seen by PTA as part of the rehabilitation team    Cathleen Acosta, PT

## 2020-02-05 ENCOUNTER — PATIENT MESSAGE (OUTPATIENT)
Dept: INTERNAL MEDICINE | Facility: CLINIC | Age: 60
End: 2020-02-05

## 2020-02-06 ENCOUNTER — OFFICE VISIT (OUTPATIENT)
Dept: INTERNAL MEDICINE | Facility: CLINIC | Age: 60
End: 2020-02-06
Payer: MEDICARE

## 2020-02-06 VITALS
HEIGHT: 61 IN | SYSTOLIC BLOOD PRESSURE: 128 MMHG | HEART RATE: 99 BPM | BODY MASS INDEX: 30.01 KG/M2 | TEMPERATURE: 98 F | RESPIRATION RATE: 18 BRPM | DIASTOLIC BLOOD PRESSURE: 72 MMHG | WEIGHT: 158.94 LBS

## 2020-02-06 DIAGNOSIS — R51.9 SINUS HEADACHE: ICD-10-CM

## 2020-02-06 DIAGNOSIS — B97.89 VIRAL SINUSITIS: Primary | ICD-10-CM

## 2020-02-06 DIAGNOSIS — N30.00 ACUTE CYSTITIS WITHOUT HEMATURIA: ICD-10-CM

## 2020-02-06 DIAGNOSIS — J20.9 ACUTE BRONCHITIS, UNSPECIFIED ORGANISM: ICD-10-CM

## 2020-02-06 DIAGNOSIS — J32.9 VIRAL SINUSITIS: Primary | ICD-10-CM

## 2020-02-06 PROCEDURE — 99999 PR PBB SHADOW E&M-EST. PATIENT-LVL III: CPT | Mod: PBBFAC,,, | Performed by: INTERNAL MEDICINE

## 2020-02-06 PROCEDURE — 3008F BODY MASS INDEX DOCD: CPT | Mod: CPTII,S$GLB,, | Performed by: INTERNAL MEDICINE

## 2020-02-06 PROCEDURE — 99214 PR OFFICE/OUTPT VISIT, EST, LEVL IV, 30-39 MIN: ICD-10-PCS | Mod: S$GLB,,, | Performed by: INTERNAL MEDICINE

## 2020-02-06 PROCEDURE — 99999 PR PBB SHADOW E&M-EST. PATIENT-LVL III: ICD-10-PCS | Mod: PBBFAC,,, | Performed by: INTERNAL MEDICINE

## 2020-02-06 PROCEDURE — 99214 OFFICE O/P EST MOD 30 MIN: CPT | Mod: S$GLB,,, | Performed by: INTERNAL MEDICINE

## 2020-02-06 PROCEDURE — 3008F PR BODY MASS INDEX (BMI) DOCUMENTED: ICD-10-PCS | Mod: CPTII,S$GLB,, | Performed by: INTERNAL MEDICINE

## 2020-02-06 RX ORDER — DOXYCYCLINE 100 MG/1
100 CAPSULE ORAL 2 TIMES DAILY
Qty: 14 CAPSULE | Refills: 0 | Status: SHIPPED | OUTPATIENT
Start: 2020-02-06 | End: 2020-02-13

## 2020-02-06 NOTE — PROGRESS NOTES
Subjective:       Patient ID: Joyce Peterson is a 59 y.o. female.    Chief Complaint: URI (possible bladder infection)    HPI   Pt c/o 2.5 wks of worsening sinus/chest congestion, productive cough, sore throat, sinus headache, SOB/wheezing. Minimal relief with Claritin, Flonase/Astelin, Mucinex,     Pt also c/o 3-4 days of increased frequency of urination, low back pain, mild dysuria.   Review of Systems   Constitutional: Negative for activity change, appetite change, chills, diaphoresis, fatigue, fever and unexpected weight change.   HENT: Positive for congestion, postnasal drip, rhinorrhea, sinus pressure, sinus pain and sore throat. Negative for sneezing, trouble swallowing and voice change.    Respiratory: Positive for cough, shortness of breath and wheezing.    Cardiovascular: Negative for chest pain, palpitations and leg swelling.   Gastrointestinal: Negative for abdominal pain, blood in stool, constipation, diarrhea, nausea and vomiting.   Genitourinary: Positive for dysuria, frequency and urgency. Negative for flank pain, genital sores and pelvic pain.   Musculoskeletal: Negative for arthralgias and myalgias.   Skin: Negative for rash and wound.   Allergic/Immunologic: Negative for environmental allergies and food allergies.   Neurological: Positive for headaches.   Hematological: Negative for adenopathy. Does not bruise/bleed easily.       Objective:      Physical Exam   Constitutional: She is oriented to person, place, and time. She appears well-developed and well-nourished. No distress.   HENT:   Head: Normocephalic and atraumatic.   Right Ear: External ear normal.   Left Ear: External ear normal.   Nose: Mucosal edema and rhinorrhea present.   Mouth/Throat: Oropharynx is clear and moist. No oropharyngeal exudate.   Eyes: Pupils are equal, round, and reactive to light. Conjunctivae and EOM are normal. Right eye exhibits no discharge. Left eye exhibits no discharge. No scleral icterus.   Neck: Neck  supple. No JVD present.   Cardiovascular: Normal rate, regular rhythm, normal heart sounds and intact distal pulses.   Pulmonary/Chest: Effort normal and breath sounds normal. No respiratory distress. She has no wheezes. She has no rales.   Abdominal: Soft. Bowel sounds are normal. There is no tenderness.   Musculoskeletal: She exhibits no edema.   Lymphadenopathy:     She has no cervical adenopathy.   Neurological: She is alert and oriented to person, place, and time.   Skin: Skin is warm and dry. No rash noted. She is not diaphoretic. No pallor.       Assessment:       1. Viral sinusitis    2. Acute bronchitis, unspecified organism    3. Sinus headache    4. Acute cystitis without hematuria        Plan:    1. Continue Xyzal/Flonase/Astelin   2. Start Mucinex DM PRN    3. NSAIDs PRN   4. Check UA/Urine Cx       Rx Doxycycline 100 mg BID x 7 days

## 2020-02-10 ENCOUNTER — CLINICAL SUPPORT (OUTPATIENT)
Dept: REHABILITATION | Facility: HOSPITAL | Age: 60
End: 2020-02-10
Payer: MEDICARE

## 2020-02-10 DIAGNOSIS — R26.2 DIFFICULTY WALKING: ICD-10-CM

## 2020-02-10 PROCEDURE — 97113 AQUATIC THERAPY/EXERCISES: CPT

## 2020-02-10 NOTE — PROGRESS NOTES
"Physical Therapy Daily Treatment Note     Name: Joyce Wuard  Clinic Number: [id number]    Therapy Diagnosis:   Encounter Diagnosis   Name Primary?    Difficulty walking      Physician: Jett Julien III, *    Visit Date: 2/10/2020  Physician Orders: PT Eval and Treat aquatic therapy  Medical Diagnosis from Referral:   M25.551 (ICD-10-CM) - Right hip pain   M25.552 (ICD-10-CM) - Left hip pain         Evaluation Date: 12/5/2019  Authorization Period Expiration: 12/31/2020  Plan of Care Expiration: 2/27/2020  Visit # / Visits authorized: 6/20  Total visits: 7      Time In: 1000  Time Out: 1100  Total Billable Time: 15 minutes    Precautions: osteoporosis with pathological fx L femur, asthma, RA, Sjogrens, R foot metatarsal fx 3-5 patholigical fx    PROCEDURES/IMAGING: IMN L femur 5/21/2019, L  triceps tendon repair 10/17/2018  Subjective     Pt reports: "My butt & legs have been sore  She was compliant with home exercise program.  Response to previous treatment: muscle soreness x 1 day  Functional change: easier going up steps @ home    Pain: 4/10  Location: neck & thighs    Objective     Joyce received aquatic therapeutic exercises to develop strength, endurance, ROM, flexibility, posture and core stabilization for 60 minutes including:    WARMUP x 2 laps each  Walk fwd/back/side    STRETCHES 3 x 20sec  Hamstring  Gastroc    LE EX x 20  Mini squat  Heel raise with GS x 10  Hip abd/add  Hip flex/ext  LAQ with hip flex  HS curls  Fire hydrants  Lunge forward with chest press small red kickboard  Lunge lateral with chest press small red kickboard  Step up/over forward Box x 12 each LE  Step up/over lateral x 12 each LE  Leg press noodle/tubing x 10 each LE  Hip extension noodle/tubing x 10 each LE    UE EX/CORE  x 20  Shoulder flex/ext TA activation lite DB modified tandem alternating  Shoulder horizontal abd/add TA activation lite DB modified tandem alternating  Shoulder abd/add TA activation lite DB modified " tandem alternating  Rows lime tubing modified tandem stance  Shoulder extension lime tubing modified tandem stance  Chest press lime tubing modified tandem stance  Shoulder flexion lime tubing modified tandem stance  Reverse crunch // bars    ENDURANCE  Bicycle // bars x 6 min    COOL DOWN x 1 lap each  Walk fwd/back/side      Home Exercises Provided and Patient Education Provided     Education provided:   Role of aquatic therapy  Hydration post therapy      Written Home Exercises Provided: Patient instructed to cont prior HEP.  Exercises were reviewed and Joyce was able to demonstrate them prior to the end of the session.  Joyce demonstrated good  understanding of the education provided.     See EMR under Patient Instructions for exercises provided prior visit 12/5/2019    Assessment     Patient progressing with aquatic therapy & requires minimal cueing for TA activation with tubing exercises to prevent forward flexed trunk. Patient was able to tolerate addition of hip extension & leg press with no increase in pain. Continue with POC    Joyce is progressing well towards her goals.   Pt prognosis is Fair.     Pt will continue to benefit from skilled aquatic outpatient physical therapy to address the deficits listed in the problem list box on initial evaluation, provide pt/family education and to maximize pt's level of independence in the home and community environment.     Pt's spiritual, cultural and educational needs considered and pt agreeable to plan of care and goals.    Anticipated barriers to physical therapy: history of pathological fractures    Goals:   Short Term Goals: 6 weeks   1. Patient will be independent in HEP & progressions (progressing)  2. Patient will achieve TUG score of 13 sec or less to demonstrate improved mobility (progressing)     Long Term Goals: 12 weeks   1. Patient  will have FOTO limitation score of 46% to demonstrate improved function & decreased pain (progressing)  2. Patient  will increase L hip MMT to 4+/5 to demonstrate improved strength (progressing)  3. Patient will be able to ascend/descend 5 steps with use of single railing & pain level 3/10 or less (progressing)  4. Patient will be able to walk 100ft without AD & without gait deviations (progressing)        Plan     Increase exercises slowly 2/2 history of pathological fractures    Plan of care Certification: 12/5/2019 to 2/27/2020.     Outpatient aquatic Physical Therapy 1-2 times weekly for  12 weeks to include the following interventions: aquatic therapy, manual therapy, patient education, therapeutic exercise, therapeutic activity  Patient may be seen by PTA as part of the rehabilitation team    Cathleen Acosta, PT

## 2020-02-11 NOTE — PROGRESS NOTES
Physical Therapy Daily Treatment Note     Name: Joyce GUALLPA [place]  Clinic Number: [id number]    Therapy Diagnosis:   Encounter Diagnosis   Name Primary?    Difficulty walking      Physician: Jett Julien III, *    Visit Date: 2/12/2020  Physician Orders: PT Eval and Treat aquatic therapy  Medical Diagnosis from Referral:   M25.551 (ICD-10-CM) - Right hip pain   M25.552 (ICD-10-CM) - Left hip pain         Evaluation Date: 12/5/2019  Authorization Period Expiration: 12/31/2020  Plan of Care Expiration: 2/27/2020  Visit # / Visits authorized: 7/20  Total visits: 8      Time In: 1000  Time Out: 1100  Total Billable Time: 30 minutes    Precautions: osteoporosis with pathological fx L femur, asthma, RA, Sjogrens, R foot metatarsal fx 3-5 patholigical fx    PROCEDURES/IMAGING: IMN L femur 5/21/2019, L  triceps tendon repair 10/17/2018  Subjective     Pt reports: Three more days of antibiotics & I can get my shot Kevzara then 1 week later I start the injections daily for my bones  She was compliant with home exercise program.  Response to previous treatment: muscle soreness x 1 day  Functional change: easier going up steps @ home    Pain: 5/10  Location:  Thighs, buttocks    Objective     Joyce received aquatic therapeutic exercises to develop strength, endurance, ROM, flexibility, posture and core stabilization for 60 minutes including:    WARMUP x 2 laps each  Walk fwd/back/side    STRETCHES 3 x 20sec  Hamstring  Gastroc    LE EX x 20  Mini squat  Heel raise with GS x 10  Hip abd/add  Hip flex/ext  LAQ with hip flex  HS curls  Fire hydrants  Lunge forward with chest press small red kickboard  Lunge lateral with chest press small red kickboard  Step up/over forward Box x 12 each LE  Step up/over lateral x 12 each LE  Leg press noodle/tubing x 1 each LE  Hip extension noodle/tubing x 15 each LE  Squat walks lateral x 2 laps  Monster walks x 2 laps    UE EX/CORE  x 20  Shoulder flex/ext TA activation lite DB modified  tandem alternating  Shoulder horizontal abd/add TA activation lite DB modified tandem alternating  Shoulder abd/add TA activation lite DB modified tandem alternating  Rows lime tubing modified tandem stance  Shoulder extension lime tubing modified tandem stance  Chest press lime tubing modified tandem stance  Shoulder flexion lime tubing modified tandem stance  Reverse crunch // bars x 35  Lower truncal rotation  DKTC to hip extension x 10    ENDURANCE  Bicycle // bars x 7 min    COOL DOWN x 1 lap each  Walk fwd/back/side      Home Exercises Provided and Patient Education Provided     Education provided:   Role of aquatic therapy  Hydration post therapy      Written Home Exercises Provided: Patient instructed to cont prior HEP.  Exercises were reviewed and Joyce was able to demonstrate them prior to the end of the session.  Joyce demonstrated good  understanding of the education provided.     See EMR under Patient Instructions for exercises provided prior visit 12/5/2019    Assessment     Patient progressing with aquatic therapy & requires minimal cueing for upright posture with squat & monster walks to prevent forward flexed trunk.. Patient was able to tolerate addition of squat & monster walks with no increase in pain. Continue with POC    Joyce is progressing well towards her goals.   Pt prognosis is Fair.     Pt will continue to benefit from skilled aquatic outpatient physical therapy to address the deficits listed in the problem list box on initial evaluation, provide pt/family education and to maximize pt's level of independence in the home and community environment.     Pt's spiritual, cultural and educational needs considered and pt agreeable to plan of care and goals.    Anticipated barriers to physical therapy: history of pathological fractures    Goals:   Short Term Goals: 6 weeks   1. Patient will be independent in HEP & progressions (progressing)  2. Patient will achieve TUG score of 13 sec or less  to demonstrate improved mobility (progressing)     Long Term Goals: 12 weeks   1. Patient  will have FOTO limitation score of 46% to demonstrate improved function & decreased pain (progressing)  2. Patient will increase L hip MMT to 4+/5 to demonstrate improved strength (progressing)  3. Patient will be able to ascend/descend 5 steps with use of single railing & pain level 3/10 or less (progressing)  4. Patient will be able to walk 100ft without AD & without gait deviations (progressing)        Plan     Increase exercises slowly 2/2 history of pathological fractures    Plan of care Certification: 12/5/2019 to 2/27/2020.     Outpatient aquatic Physical Therapy 1-2 times weekly for  12 weeks to include the following interventions: aquatic therapy, manual therapy, patient education, therapeutic exercise, therapeutic activity  Patient may be seen by PTA as part of the rehabilitation team    Cathleen Acosta, PT

## 2020-02-12 ENCOUNTER — CLINICAL SUPPORT (OUTPATIENT)
Dept: REHABILITATION | Facility: HOSPITAL | Age: 60
End: 2020-02-12
Payer: MEDICARE

## 2020-02-12 DIAGNOSIS — R26.2 DIFFICULTY WALKING: ICD-10-CM

## 2020-02-12 PROCEDURE — 97113 AQUATIC THERAPY/EXERCISES: CPT

## 2020-02-13 ENCOUNTER — PATIENT OUTREACH (OUTPATIENT)
Dept: ADMINISTRATIVE | Facility: OTHER | Age: 60
End: 2020-02-13

## 2020-02-17 ENCOUNTER — OFFICE VISIT (OUTPATIENT)
Dept: OTOLARYNGOLOGY | Facility: CLINIC | Age: 60
End: 2020-02-17
Payer: MEDICARE

## 2020-02-17 VITALS
TEMPERATURE: 97 F | HEIGHT: 61 IN | HEART RATE: 113 BPM | BODY MASS INDEX: 30.16 KG/M2 | WEIGHT: 159.75 LBS | DIASTOLIC BLOOD PRESSURE: 73 MMHG | SYSTOLIC BLOOD PRESSURE: 104 MMHG

## 2020-02-17 DIAGNOSIS — J01.81 OTHER ACUTE RECURRENT SINUSITIS: Primary | ICD-10-CM

## 2020-02-17 DIAGNOSIS — J34.3 HYPERTROPHY OF INFERIOR NASAL TURBINATE: ICD-10-CM

## 2020-02-17 DIAGNOSIS — J34.2 NASAL SEPTAL DEVIATION: ICD-10-CM

## 2020-02-17 DIAGNOSIS — D84.9 IMMUNOSUPPRESSED STATUS: ICD-10-CM

## 2020-02-17 PROCEDURE — 99214 PR OFFICE/OUTPT VISIT, EST, LEVL IV, 30-39 MIN: ICD-10-PCS | Mod: S$GLB,,, | Performed by: OTOLARYNGOLOGY

## 2020-02-17 PROCEDURE — 99999 PR PBB SHADOW E&M-EST. PATIENT-LVL III: CPT | Mod: PBBFAC,,, | Performed by: OTOLARYNGOLOGY

## 2020-02-17 PROCEDURE — 99214 OFFICE O/P EST MOD 30 MIN: CPT | Mod: S$GLB,,, | Performed by: OTOLARYNGOLOGY

## 2020-02-17 PROCEDURE — 3008F PR BODY MASS INDEX (BMI) DOCUMENTED: ICD-10-PCS | Mod: CPTII,S$GLB,, | Performed by: OTOLARYNGOLOGY

## 2020-02-17 PROCEDURE — 99999 PR PBB SHADOW E&M-EST. PATIENT-LVL III: ICD-10-PCS | Mod: PBBFAC,,, | Performed by: OTOLARYNGOLOGY

## 2020-02-17 PROCEDURE — 3008F BODY MASS INDEX DOCD: CPT | Mod: CPTII,S$GLB,, | Performed by: OTOLARYNGOLOGY

## 2020-02-17 RX ORDER — FLUTICASONE PROPIONATE 50 MCG
2 SPRAY, SUSPENSION (ML) NASAL DAILY
Qty: 9.9 ML | Refills: 11 | Status: SHIPPED | OUTPATIENT
Start: 2020-02-17 | End: 2021-01-27

## 2020-02-17 NOTE — PROGRESS NOTES
"     Chief Complaint   Patient presents with    Cough     states she is still wheezing and spitting up "plugs" of mucous    Nasal Congestion     postnasal drip    Hoarse    Headache   .    HPI:     Joyce Peterson is a 59 y.o. female who is referred by Dr. Becca Peters MD for evaluation of 2 week history of increased  nasal congestion and postnasal drip. She states that she most recently was treated with doxycycline 100mg PO BID for 7 days without relief. She states that she has had a lifelong history of nasal congestion and allergies.  She states that she had positive allergy testing in the past. She has had immunotherapy in the past.  She notes that she will have 3-4 exacerbations of her nasal symptoms per year that takes 2-3 months to resolve. She also has a history of asthma and finds that her asthma is worse when she is having sinus issues.   She describes difficulty breathing at night. There is bilateral nasal obstruction. She does use sinus rinses or nasal sprays. She has been on  singulair, alavert, albuterol, symbicort, astelin, flonase.  She admits to midface pain and pressure.  She admits to rhinorrhea and postnasal drip. There is not maxillary tooth pain. She  admits to headaches.  She has not had sinus or nasal surgery. There is no history of sinonasal trauma.      Interval HPI 2/17/2020:  Mrs. Peterson follows up today for chronic cough, chronic rhinitis, and chronic sinus. She reports since her last visit here she was additionally diagnosed with bronchitis. She was seen by Dr. Cline and ws treated with doxycycline.  She reports that this helped somewhat.  She reports that she is still having cough. She notes nasal congestion, post-nasal drip, productive cough, headaches int he frontal region. She is on immunosuppressive medications for erosive RA. With recent low IgG and IgM levels. She does feel that her Flonase is helpful for her nasal symptoms.  She has avoided antihistamines as " they severely dry her eyes(Sjogren's/SICCA). She does feel they are helpful when she does take them on occasion. She also has a significant history of GERD and gastroparesis. She is currently on cisapride and zegarid for this.     Past Medical History:   Diagnosis Date    Acid reflux     Allergy     Anemia     Asthma     Coronary artery disease     Degenerative disc disease     Dry eyes     Dry mouth     Gastroparesis     Hyperlipidemia     Lateral meniscus derangement 4/6/2016    Lobular carcinoma in situ     Osteoarthritis     Osteoporosis     Rheumatoid arthritis(714.0)     Umbilical hernia 8/13/2015     Social History     Socioeconomic History    Marital status:      Spouse name: Not on file    Number of children: Not on file    Years of education: Not on file    Highest education level: Not on file   Occupational History    Not on file   Social Needs    Financial resource strain: Not on file    Food insecurity:     Worry: Not on file     Inability: Not on file    Transportation needs:     Medical: Not on file     Non-medical: Not on file   Tobacco Use    Smoking status: Never Smoker    Smokeless tobacco: Never Used   Substance and Sexual Activity    Alcohol use: Yes     Comment: occasionally    Drug use: No    Sexual activity: Yes     Partners: Male     Birth control/protection: Surgical   Lifestyle    Physical activity:     Days per week: Not on file     Minutes per session: Not on file    Stress: Not on file   Relationships    Social connections:     Talks on phone: Not on file     Gets together: Not on file     Attends Quaker service: Not on file     Active member of club or organization: Not on file     Attends meetings of clubs or organizations: Not on file     Relationship status: Not on file   Other Topics Concern    Are you pregnant or think you may be? Not Asked    Breast-feeding Not Asked   Social History Narrative    Not on file     Past Surgical History:    Procedure Laterality Date    BREAST BIOPSY Left 01/29/2002    core bx    CARPAL TUNNEL RELEASE Right 05/2017    CHOLECYSTECTOMY  2004    COLONOSCOPY      10/11    COLONOSCOPY N/A 6/29/2017    Procedure: COLONOSCOPY;  Surgeon: López Moore MD;  Location: [facility] ENDO (4TH FLR);  Service: Endoscopy;  Laterality: N/A;    INTRAMEDULLARY RODDING OF FEMUR Left 5/21/2019    Procedure: INSERTION, INTRAMEDULLARY ARIEL, FEMUR;  Surgeon: López Duff MD;  Location: [facility] OR 2ND FLR;  Service: Orthopedics;  Laterality: Left;    REPAIR OF TRICEPS TENDON Left 10/17/2018    Procedure: REPAIR, TENDON, TRICEPS left elbow;  Surgeon: Staci Yarbrough MD;  Location: [facility] OR 1ST FLR;  Service: Orthopedics;  Laterality: Left;  Anesthesia: General and regional. PRONE, k-wire , hand pan 1 and pan 2, CALL ARTHREX/Tamera notified 10-12 LO    TONSILLECTOMY      TUBAL LIGATION  2003    UPPER GASTROINTESTINAL ENDOSCOPY      10/11    uterine ablation  2003     Family History   Problem Relation Age of Onset    Cancer Mother         Lung Cancer    Emphysema Mother     Heart attack Mother     Cancer Father         Lung Cancer    Osteoarthritis Father     Lung cancer Father     Skin cancer Father     Heart disease Brother     Heart attack Brother     Osteoarthritis Paternal Aunt     Retinal detachment Maternal Aunt     No Known Problems Sister     No Known Problems Maternal Uncle     No Known Problems Paternal Uncle     No Known Problems Maternal Grandmother     No Known Problems Maternal Grandfather     No Known Problems Paternal Grandmother     No Known Problems Paternal Grandfather     Colon cancer Neg Hx     Esophageal cancer Neg Hx     Stomach cancer Neg Hx     Celiac disease Neg Hx     Diabetes Neg Hx     Thyroid disease Neg Hx     Amblyopia Neg Hx     Blindness Neg Hx     Cataracts Neg Hx     Glaucoma Neg Hx     Hypertension Neg Hx     Macular degeneration Neg Hx     Strabismus Neg Hx      Stroke Neg Hx            Review of Systems  General: negative for chills, fever or weight loss  Psychological: negative for mood changes or depression  Ophthalmic: negative for blurry vision, photophobia or eye pain  ENT: see HPI  Respiratory: no cough, shortness of breath, or wheezing  Cardiovascular: no chest pain or dyspnea on exertion  Gastrointestinal: no abdominal pain, change in bowel habits, or black/ bloody stools  Musculoskeletal: negative for gait disturbance or muscular weakness  Neurological: no syncope or seizures; no ataxia  Dermatological: negative for puritis,  rash and jaundice  Hematologic/lymphatic: no easy bruising, no new lumps or bumps      Physical Exam:    Vitals:    02/17/20 1356   BP: 104/73   Pulse: (!) 113   Temp: 97 °F (36.1 °C)       Constitutional: Well appearing / communicating without difficutly.  NAD.  Eyes: EOM I Bilaterally  Head/Face: Normocephalic.  Negative paranasal sinus pressure/tenderness.  Salivary glands WNL.  House Brackmann I Bilaterally.    Right Ear: Auricle normal appearance. External Auditory Canal within normal limits,TM w/o masses/lesions/perforations. TM mobility noted.   Left Ear: Auricle normal appearance. External Auditory Canal WNL,TM w/o masses/lesions/perforations. TM mobility noted.  Nose: Nasal septal deviation to the left. Inferior Turbinates 3+ bilaterally. No septal perforation. No masses/lesions. External nasal skin appears normal without masses/lesions.  Oral Cavity: Gingiva/lips within normal limits.  Dentition/gingiva healthy appearing. Mucus membranes moist. Floor of mouth soft, no masses palpated. Oral Tongue mobile. Hard Palate appears normal.    Oropharynx: Base of tongue appears normal. No masses/lesions noted. Tonsillar fossa/pharyngeal wall without lesions. Posterior oropharynx WNL.  Soft palate without masses. Midline uvula.   Neck/Lymphatic: No LAD I-VI bilaterally.  No thyromegaly.  No masses noted on exam.    Mirror  laryngoscopy/nasopharyngoscopy: Active gag reflex.  Unable to perform.    Neuro/Psychiatric: AOx3.  Normal mood and affect.   Cardiovascular: Normal carotid pulses bilaterally, no increasing jugular venous distention noted at cervical region bilaterally.    Respiratory: Normal respiratory effort, no stridor, no retractions noted.      Diagnostic testing reviewed:  ImmunoCAP testing reviewed- negative    Results for TONNY GOMEZ (MRN [id number]) as of 2/17/2020 17:57   Ref. Range 7/20/2018 13:58 11/13/2018 09:47   IgG - Serum Latest Ref Range: 650 - 1600 mg/dL  666   IgM Latest Ref Range: 50 - 300 mg/dL  30 (L)   IgA Latest Ref Range: 40 - 350 mg/dL  121   IgG 1 Latest Ref Range: 382 - 929 mg/dL  367 (L)   IgG 2 Latest Ref Range: 242 - 700 mg/dL  266   IgG 3 Latest Ref Range: 22 - 176 mg/dL  37   IgG 4 Latest Ref Range: 4 - 86 mg/dL  5       Ct sinus 1/24/2020: Images reviewed and interpreted by myself and reviewed in detail with patient.  Findings of mild patchy ethmoid mucosal thickening. The frontal, sphenoid, and maxillary sinuses are clear. NO bony changes to suggest chronic sinus inflammation.      Assessment:    ICD-10-CM ICD-9-CM    1. Other acute recurrent sinusitis J01.81 461.9    2. Hypertrophy of inferior nasal turbinate J34.3 478.0    3. Nasal septal deviation J34.2 470    4. Immunosuppressed status D89.9 279.9      The primary encounter diagnosis was Other acute recurrent sinusitis. Diagnoses of Hypertrophy of inferior nasal turbinate, Nasal septal deviation, and Immunosuppressed status were also pertinent to this visit.      Plan:  No orders of the defined types were placed in this encounter.  Continue nasal saline rinses BID  Change Flonase 2 sprays per nostril BID  Use xyzal 5mg PO q day as tolerated due to occular symptoms.  We discussed that I see no findings on CT scan to indicate CRS and that her recurrent symptoms are likely secondary to immune suppression.     Rachelle Mcguire MD

## 2020-02-18 ENCOUNTER — PATIENT MESSAGE (OUTPATIENT)
Dept: ENDOCRINOLOGY | Facility: CLINIC | Age: 60
End: 2020-02-18

## 2020-02-18 ENCOUNTER — TELEPHONE (OUTPATIENT)
Dept: ENDOCRINOLOGY | Facility: HOSPITAL | Age: 60
End: 2020-02-18

## 2020-02-18 ENCOUNTER — PATIENT MESSAGE (OUTPATIENT)
Dept: SPORTS MEDICINE | Facility: CLINIC | Age: 60
End: 2020-02-18

## 2020-02-18 DIAGNOSIS — E04.1 THYROID NODULE: Primary | ICD-10-CM

## 2020-02-18 NOTE — PROGRESS NOTES
Physical Therapy Daily Treatment Note     Name: Joyce Wuard  Clinic Number: [id number]    Therapy Diagnosis:   Encounter Diagnosis   Name Primary?    Difficulty walking      Physician: Jett Julien III, *    Visit Date: 2/19/2020  Physician Orders: PT Eval and Treat aquatic therapy  Medical Diagnosis from Referral:   M25.551 (ICD-10-CM) - Right hip pain   M25.552 (ICD-10-CM) - Left hip pain         Evaluation Date: 12/5/2019  Authorization Period Expiration: 12/31/2020  Plan of Care Expiration: 2/27/2020  Visit # / Visits authorized: 8/20  Total visits: 9    Time In: 1000  Time Out: 1100  Total Billable Time: 30 minutes    Precautions: osteoporosis with pathological fx L femur, asthma, RA, Sjogrens, R foot metatarsal fx 3-5 patholigical fx    PROCEDURES/IMAGING: IMN L femur 5/21/2019, L  triceps tendon repair 10/17/2018  Subjective     Pt reports:I am hurting a lot in my butt, back of my legs & my left ankle  She was compliant with home exercise program.  Response to previous treatment: muscle soreness x 1 day  Functional change: easier going up steps @ home    Pain:8.5/10  Location:  Thighs, buttocks, L ankle    Objective     Joyce received aquatic therapeutic exercises to develop strength, endurance, ROM, flexibility, posture and core stabilization for 60 minutes including:    WARMUP x 2 laps each  Walk fwd/back/side    STRETCHES 3 x 20sec  Hamstring  Gastroc    LE EX x 20  Mini squat  Heel raise with GS x 10  Hip abd/add  Hip flex/ext  LAQ with hip flex  HS curls  Fire hydrants  Lunge forward with chest press small red kickboard  Lunge lateral with chest press small red kickboard  Step up/over forward Box x 12 each LE  Step up/over lateral x 12 each LE  Leg press noodle/tubing x 15 each LE-np  Hip extension noodle/tubing x 15 each LE-np  Squat walks lateral  yellow Tband x 2 laps  Monster walks yellow Tband x 2 laps    UE EX/CORE  x 20  Shoulder flex/ext TA activation lite DB modified tandem  alternating  Shoulder horizontal abd/add TA activation lite DB modified tandem alternating  Shoulder abd/add TA activation lite DB modified tandem alternating  Rows lime tubing modified tandem stance  Shoulder extension lime tubing modified tandem stance  Chest press lime tubing modified tandem stance  Shoulder flexion lime tubing modified tandem stance  Reverse crunch // bars x 35  Lower truncal rotation  DKTC to hip extension x 10    ENDURANCE  Bicycle // bars x 8 min    COOL DOWN x 1 lap each  Walk fwd/back/side      Home Exercises Provided and Patient Education Provided     Education provided:   Role of aquatic therapy  Hydration post therapy      Written Home Exercises Provided: Patient instructed to cont prior HEP.  Exercises were reviewed and Joyce was able to demonstrate them prior to the end of the session.  Joyce demonstrated good  understanding of the education provided.     See EMR under Patient Instructions for exercises provided prior visit 12/5/2019    Assessment   Patient presents with high pain levels & unable to perform leg press & hip extension using noodle/tubing 2/2/ pain. Patient off RA medication x 3 weeks 2/2 taking antibiotics & this is a contributing to high pain levels. Patient able to tolerate rest of exercise program with no increase in pain. Patient does get relief of some pain while in the pool 8.5 to 6/10. Patient with 5/5 L hip strength, TUG score of 8 sec. Therapy goals achieved with exception of FOTO goal. Current FOTO limitation score is 55% Patient issued HEP of UE/LE exercises & pool exercises using tubing & Tband. Patient to continue to exercises in daughter's heated pool to maintain her fitness level      Anticipated barriers to physical therapy: history of pathological fractures    Goals:   Short Term Goals: 6 weeks   1. Patient will be independent in HEP & progressions met  2. Patient will achieve TUG score of 13 sec or less to demonstrate improved mobility (met)     Long  Term Goals: 12 weeks   1. Patient  will have FOTO limitation score of 46% to demonstrate improved function & decreased pain (not met)  2. Patient will increase L hip MMT to 4+/5 to demonstrate improved strength (met)  3. Patient will be able to ascend/descend 5 steps with use of single railing & pain level 3/10 or less (met)  4. Patient will be able to walk 100ft without AD & without gait deviations (met)        Plan     D/C aquatic PT      Cathleen Acosta, PT

## 2020-02-18 NOTE — TELEPHONE ENCOUNTER
Spoke with patient, states need appointment ASAP per PCP request. Scheduled patient with Dr Deanna salas available

## 2020-02-19 ENCOUNTER — PATIENT OUTREACH (OUTPATIENT)
Dept: ADMINISTRATIVE | Facility: OTHER | Age: 60
End: 2020-02-19

## 2020-02-19 ENCOUNTER — OFFICE VISIT (OUTPATIENT)
Dept: ENDOCRINOLOGY | Facility: CLINIC | Age: 60
End: 2020-02-19
Payer: MEDICARE

## 2020-02-19 ENCOUNTER — CLINICAL SUPPORT (OUTPATIENT)
Dept: REHABILITATION | Facility: HOSPITAL | Age: 60
End: 2020-02-19
Payer: MEDICARE

## 2020-02-19 ENCOUNTER — LAB VISIT (OUTPATIENT)
Dept: LAB | Facility: HOSPITAL | Age: 60
End: 2020-02-19
Attending: INTERNAL MEDICINE
Payer: MEDICARE

## 2020-02-19 VITALS
HEART RATE: 76 BPM | DIASTOLIC BLOOD PRESSURE: 74 MMHG | RESPIRATION RATE: 18 BRPM | HEIGHT: 61 IN | BODY MASS INDEX: 30.34 KG/M2 | WEIGHT: 160.69 LBS | SYSTOLIC BLOOD PRESSURE: 120 MMHG

## 2020-02-19 DIAGNOSIS — R26.2 DIFFICULTY WALKING: ICD-10-CM

## 2020-02-19 DIAGNOSIS — T38.0X5A STEROID-INDUCED OSTEOPOROSIS: Chronic | ICD-10-CM

## 2020-02-19 DIAGNOSIS — E04.1 THYROID NODULE: Chronic | ICD-10-CM

## 2020-02-19 DIAGNOSIS — E05.90 SUBCLINICAL HYPERTHYROIDISM: Primary | ICD-10-CM

## 2020-02-19 DIAGNOSIS — M81.8 STEROID-INDUCED OSTEOPOROSIS: Chronic | ICD-10-CM

## 2020-02-19 DIAGNOSIS — E04.1 THYROID NODULE: ICD-10-CM

## 2020-02-19 LAB
T4 FREE SERPL-MCNC: 0.88 NG/DL (ref 0.71–1.51)
THYROPEROXIDASE IGG SERPL-ACNC: <6 IU/ML
TSH SERPL DL<=0.005 MIU/L-ACNC: 0.35 UIU/ML (ref 0.4–4)

## 2020-02-19 PROCEDURE — 97113 AQUATIC THERAPY/EXERCISES: CPT

## 2020-02-19 PROCEDURE — 84439 ASSAY OF FREE THYROXINE: CPT

## 2020-02-19 PROCEDURE — 86376 MICROSOMAL ANTIBODY EACH: CPT

## 2020-02-19 PROCEDURE — 3008F BODY MASS INDEX DOCD: CPT | Mod: CPTII,S$GLB,, | Performed by: INTERNAL MEDICINE

## 2020-02-19 PROCEDURE — 36415 COLL VENOUS BLD VENIPUNCTURE: CPT

## 2020-02-19 PROCEDURE — 99999 PR PBB SHADOW E&M-EST. PATIENT-LVL III: CPT | Mod: PBBFAC,,, | Performed by: INTERNAL MEDICINE

## 2020-02-19 PROCEDURE — 3008F PR BODY MASS INDEX (BMI) DOCUMENTED: ICD-10-PCS | Mod: CPTII,S$GLB,, | Performed by: INTERNAL MEDICINE

## 2020-02-19 PROCEDURE — 99999 PR PBB SHADOW E&M-EST. PATIENT-LVL III: ICD-10-PCS | Mod: PBBFAC,,, | Performed by: INTERNAL MEDICINE

## 2020-02-19 PROCEDURE — 99214 PR OFFICE/OUTPT VISIT, EST, LEVL IV, 30-39 MIN: ICD-10-PCS | Mod: S$GLB,,, | Performed by: INTERNAL MEDICINE

## 2020-02-19 PROCEDURE — 84443 ASSAY THYROID STIM HORMONE: CPT

## 2020-02-19 PROCEDURE — 99214 OFFICE O/P EST MOD 30 MIN: CPT | Mod: S$GLB,,, | Performed by: INTERNAL MEDICINE

## 2020-02-19 NOTE — PROGRESS NOTES
Joyce Peterson is a 59 y.o. female with RA on chronic steroids presenting for evaluation of subclinical hyperthyroidism/thyroiditis, thyroid nodule osteoporosis    Previously seen by Dr. Lane, new to me    History of Present Illness  Subclinical hyperthyroidism dating back to 2016  In the past she has had mild reduction of TSH although with values normal over the last year  She and Dr. Forbes discussed METHIMAZOLE in 2016 but she did not want to add additional pill and thyroid function subsequently normalized      On chronic steroids. Currently pred 5 mg daily    A few weeks ago had some boxes hit her neck when packing up [place]. Has had pain in left neck since that time which promoted US by PCP which showed known nodules and thyroiditis throughout gland    Labs with TSH 0.349 with normal FT4 and neg TPO AB    Weight: stable  Energy level: normal  Cold/heat intolerance:none  Bowel movements: denies hyperdefecation  Tremor: none  Palpitations: none  Nervousness/mood changes: none    Exposure to iodine/contrast: none    Thyroid nodules  Dominant R nodule FNA 8/6/12 Benign    No hx of thyroid cancer in family  No hx of radiation exposure  Denies personal history of breast or colon cancer.     GIOP  Managed by Dr. Lane  Will be starting forteo injections tonight      Current Outpatient Medications:     albuterol (ACCUNEB) 1.25 mg/3 mL Nebu, Take 3 mLs (1.25 mg total) by nebulization every 6 (six) hours as needed. Rescue, Disp: 1 Box, Rfl: 2    albuterol (PROVENTIL HFA) 90 mcg/actuation inhaler, Inhale 2 puffs into the lungs every 6 (six) hours as needed. Rescue, Disp: 20.1 Inhaler, Rfl: 11    amoxicillin (AMOXIL) 500 MG capsule, Take 1 capsule by mouth three times a day., Disp: 30 capsule, Rfl: 0    ampicillin (PRINCIPEN) 500 MG capsule, TAKE 1 CAPUSLE BY MOUTH EVERY 6 HOURS, Disp: , Rfl: 2    aspirin 81 mg Tab, Take 81 mg by mouth every morning. , Disp: , Rfl:     azelastine (ASTELIN) 137 mcg nasal  spray, 1 spray (137 mcg total) by Nasal route 2 (two) times daily., Disp: 30 mL, Rfl: 11    azithromycin (Z-DEVON) 250 MG tablet, , Disp: , Rfl:     benzonatate (TESSALON) 100 MG capsule, Take 1 capsule by mouth three times a day, Disp: 9 capsule, Rfl: 0    budesonide-formoterol 160-4.5 mcg (SYMBICORT) 160-4.5 mcg/actuation HFAA, Inhale 2 puffs into the lungs every 12 (twelve) hours., Disp: 3 Inhaler, Rfl: 3    calcium citrate-vitamin D (CITRACAL + D) 315-200 mg-unit per tablet, Take 1 tablet by mouth 2 (two) times daily. , Disp: , Rfl:     diazePAM (VALIUM) 10 MG Tab, Take 1 tablet (10 mg total) by mouth On call Procedure., Disp: 3 tablet, Rfl: 0    diclofenac sodium (VOLTAREN) 1 % Gel, APPLY 2 G TOPICALLY 4 (FOUR) TIMES DAILY AS NEEDED., Disp: 500 g, Rfl: 5    doxycycline (MONODOX) 50 MG Cap, Take 1 capsule (50 mg total) by mouth 2 (two) times daily., Disp: 60 capsule, Rfl: 6    fluconazole (DIFLUCAN) 100 MG tablet, , Disp: , Rfl:     fluticasone propionate (FLONASE) 50 mcg/actuation nasal spray, 2 sprays (100 mcg total) by Each Nostril route once daily., Disp: 9.9 mL, Rfl: 11    GUAIFENESIN (MUCINEX ORAL), Take 400 mg by mouth every 4 (four) hours as needed. , Disp: , Rfl:     INV PROPULSID 10 MG, Take 1 tablets (20 mg) by mouth 4 (four) times daily. FOR INVESTIGATIONAL USE ONLY. Protocol CIS-USA-154, Disp: 1560 each, Rfl: 0    leflunomide (ARAVA) 20 MG Tab, Take 1 tablet (20 mg total) by mouth once daily., Disp: 90 tablet, Rfl: 0    lifitegrast (XIIDRA) 5 % Dpet, Place 1 drop into both eyes 2 (two) times daily., Disp: 60 each, Rfl: 12    methylPREDNISolone (MEDROL DOSEPACK) 4 mg tablet, use as directed, Disp: 1 Package, Rfl: 0    montelukast (SINGULAIR) 10 mg tablet, Take 1 tablet (10 mg total) by mouth nightly., Disp: 90 tablet, Rfl: 3    naproxen (NAPROSYN) 500 MG tablet, TAKE 1 TABLET TWICE A DAY AS NEEDED, Disp: 180 tablet, Rfl: 0    nitrofurantoin, macrocrystal-monohydrate, (MACROBID) 100 MG  "capsule, , Disp: , Rfl:     omeprazole (PRILOSEC) 40 MG capsule, Take 1 capsule (40 mg total) by mouth every morning., Disp: 30 capsule, Rfl: 6    omeprazole-sodium bicarbonate (ZEGERID) 40-1.1 mg-gram per capsule, TAKE 1 CAPSULE BY MOUTH EVERY MORNING., Disp: 90 capsule, Rfl: 3    POTASSIUM ORAL, Take by mouth once daily., Disp: , Rfl:     predniSONE (DELTASONE) 5 MG tablet, Take 1 tablet (5 mg total) by mouth once daily., Disp: 90 tablet, Rfl: 1    PREMARIN 0.625 mg tablet, Take 0.625 mg by mouth once daily., Disp: , Rfl: 4    PREMARIN vaginal cream, PLACE 0.5 GRAM VAGINALLY 3 (THREE) TIMES A WEEK., Disp: 30 g, Rfl: 3    progesterone (PROMETRIUM) 100 MG capsule, Take 100 mg by mouth every morning. 1 Capsule Oral Every day, morning, Disp: , Rfl:     promethazine-dextromethorphan (PROMETHAZINE-DM) 6.25-15 mg/5 mL Syrp, Take 5 mL by mouth every 6 hours as needed, Disp: 140 mL, Rfl: 0    RESTASIS 0.05 % ophthalmic emulsion, PLACE 0.4 MLS (1 DROP TOTAL) INTO BOTH EYES 2 (TWO) TIMES DAILY., Disp: , Rfl: 5    rizatriptan (MAXALT) 10 MG tablet, Take 10 mg by mouth as needed for Migraine. , Disp: , Rfl:     rosuvastatin (CRESTOR) 20 MG tablet, Take 1 tablet (20 mg total) by mouth every evening., Disp: 90 tablet, Rfl: 3    sarilumab (KEVZARA) 200 mg/1.14 mL Syrg, Inject 1.14 mLs (200 mg total) into the skin every 14 (fourteen) days., Disp: 2.28 mL, Rfl: 2    sucralfate (CARAFATE) 1 gram tablet, TAKE 1 TABLET (1 G TOTAL) BY MOUTH 4 (FOUR) TIMES DAILY., Disp: 360 tablet, Rfl: 3    syringe with needle (TUBERCULIN SYRINGE) 1 mL 27 x 1/2" Syrg, To administer methotrexate 7.5mg sc once a week, Disp: 12 Syringe, Rfl: 3    teriparatide (FORTEO) 20 mcg/dose - 600 mcg/2.4 mL PnIj, Inject 0.08 mLs (20 mcg total) into the skin once daily., Disp: 2.4 mL, Rfl: 11    traMADol (ULTRAM) 50 mg tablet, Take 1 tablet (50 mg total) by mouth every 24 hours as needed for Pain., Disp: 7 tablet, Rfl: 0    valACYclovir (VALTREX) " 1000 MG tablet, TAKE 1 TABLET BY MOUTH TWICE A DAY AS NEEDED, Disp: 20 tablet, Rfl: 3  No current facility-administered medications for this visit.     Facility-Administered Medications Ordered in Other Visits:     0.9%  NaCl infusion, , Intravenous, Continuous, Callum Singer MD, Stopped at 05/21/19 1100    Review of Systems   Constitutional: Negative for activity change and unexpected weight change.   Respiratory: Negative for shortness of breath.    Cardiovascular: Negative for chest pain and leg swelling.   Gastrointestinal: Negative for abdominal pain.   Genitourinary: Negative for dysuria.   Skin: Negative for rash.   Neurological: Negative for headaches.   Psychiatric/Behavioral: Negative for confusion.       Objective:     Vitals:    02/19/20 1445   BP: 120/74   Pulse: 76   Resp: 18     Wt Readings from Last 3 Encounters:   02/19/20 72.9 kg (160 lb 11.5 oz)   02/17/20 72.4 kg (159 lb 11.6 oz)   02/06/20 72.1 kg (158 lb 15.2 oz)     Body mass index is 30.37 kg/m².  Physical Exam   Constitutional: She is oriented to person, place, and time. She appears well-developed and well-nourished.   HENT:   Head: Normocephalic.   Eyes: Conjunctivae and EOM are normal.   Neck:   Tenderness over R lobe of thyroid and left mandible  No thyromegaly   Cardiovascular: Normal rate.   Pulmonary/Chest: Effort normal.   Musculoskeletal: Normal range of motion. She exhibits no edema.   Neurological: She is alert and oriented to person, place, and time.   No tremor   Skin: Skin is warm. No rash noted.       LABS    Chemistry        Component Value Date/Time     01/09/2020 1133    K 4.5 01/09/2020 1133     01/09/2020 1133    CO2 27 01/09/2020 1133    BUN 22 (H) 01/09/2020 1133    CREATININE 0.80 01/09/2020 1133     (H) 01/09/2020 1133        Component Value Date/Time    CALCIUM 8.8 01/09/2020 1133    ALKPHOS 83 01/09/2020 1133    AST 43 01/09/2020 1133    ALT 36 01/09/2020 1133    BILITOT 0.6 01/09/2020 1133     ESTGFRAFRICA >60.0 01/09/2020 1133    EGFRNONAA >60.0 01/09/2020 1133          Thyroid US 2/2010:Thyroid is normal in size.  Right lobe: 4.2 x 1.4 x 1.4 cm.  Left lobe: 4.7 x 1.6 x 1.6 cm.  Heterogeneous thyroid parenchyma.  Diffusely increased thyroid vascularity.  Right nodules: 1.2 cm heterogeneous nodule at the lower pole with slight hypoechogenicity, 0.7 predominantly cystic nodule at the lower pole.  Left nodules: 1.0 cm mixed solid and cystic nodule at the interpolar region (previously 0.9 cm), 0.7 cm heterogeneous hypointense nodule at the superior pole, 0.3 cm predominantly cystic nodule at the inferior pole.  Isthmus: 1.2 cm predominantly cystic nodule (previously 1.2 cm).    No abnormal morphology cervical lymph nodes.    Assessment and Plan     Subclinical hyperthyroidism  Intermittent subclinical for several years   Suspect due to Graves' disease given chronicity and diffuse hypervascularity seen on recent US although may also be related to chronic steroid use  Indications for treatment would include osteoporosis with fragility fracture although given very mild reduction will plan for repeat TSH and TRAB in 2 months with further consideration for need for treatment at this time  Starting forteo as below    Thyroid nodule  Right nodule meeting criteria for FNA appears stable compared to prior imaging and with negative FNA in past  Plan for repeat in about 2 years    Steroid-induced osteoporosis  Will start Forteo injections tonight  Cont to follow with Dr. Lane for management of GIOP        RTC 1 year with Dr. Domingo Huerta MD

## 2020-02-21 PROBLEM — E05.90 SUBCLINICAL HYPERTHYROIDISM: Status: ACTIVE | Noted: 2020-02-21

## 2020-02-21 NOTE — ASSESSMENT & PLAN NOTE
Right nodule meeting criteria for FNA appears stable compared to prior imaging and with negative FNA in past  Plan for repeat in about 2 years

## 2020-02-21 NOTE — ASSESSMENT & PLAN NOTE
Intermittent subclinical for several years   Suspect due to Graves' disease given chronicity and diffuse hypervascularity seen on recent US although may also be related to chronic steroid use  Indications for treatment would include osteoporosis with fragility fracture although given very mild reduction will plan for repeat TSH and TRAB in 2 months with further consideration for need for treatment at this time  Starting forteo as below

## 2020-02-26 ENCOUNTER — TELEPHONE (OUTPATIENT)
Dept: SPORTS MEDICINE | Facility: CLINIC | Age: 60
End: 2020-02-26

## 2020-02-26 ENCOUNTER — TELEPHONE (OUTPATIENT)
Dept: PHARMACY | Facility: CLINIC | Age: 60
End: 2020-02-26

## 2020-02-26 DIAGNOSIS — M54.50 CHRONIC LOW BACK PAIN, UNSPECIFIED BACK PAIN LATERALITY, UNSPECIFIED WHETHER SCIATICA PRESENT: Primary | ICD-10-CM

## 2020-02-26 DIAGNOSIS — G89.29 CHRONIC LOW BACK PAIN, UNSPECIFIED BACK PAIN LATERALITY, UNSPECIFIED WHETHER SCIATICA PRESENT: Primary | ICD-10-CM

## 2020-02-28 ENCOUNTER — HOSPITAL ENCOUNTER (OUTPATIENT)
Dept: RADIOLOGY | Facility: HOSPITAL | Age: 60
Discharge: HOME | End: 2020-02-28
Attending: PHYSICAL MEDICINE & REHABILITATION
Payer: MEDICARE

## 2020-02-28 ENCOUNTER — PATIENT MESSAGE (OUTPATIENT)
Dept: PODIATRY | Facility: CLINIC | Age: 60
End: 2020-02-28

## 2020-02-28 ENCOUNTER — PATIENT OUTREACH (OUTPATIENT)
Dept: ADMINISTRATIVE | Facility: OTHER | Age: 60
End: 2020-02-28

## 2020-02-28 ENCOUNTER — OFFICE VISIT (OUTPATIENT)
Dept: SPINE | Facility: CLINIC | Age: 60
End: 2020-02-28
Attending: PHYSICAL MEDICINE & REHABILITATION
Payer: MEDICARE

## 2020-02-28 VITALS
DIASTOLIC BLOOD PRESSURE: 79 MMHG | WEIGHT: 160 LBS | HEART RATE: 93 BPM | SYSTOLIC BLOOD PRESSURE: 128 MMHG | BODY MASS INDEX: 30.21 KG/M2 | HEIGHT: 61 IN

## 2020-02-28 DIAGNOSIS — M54.50 CHRONIC BILATERAL LOW BACK PAIN WITHOUT SCIATICA: ICD-10-CM

## 2020-02-28 DIAGNOSIS — M25.572 ACUTE LEFT ANKLE PAIN: ICD-10-CM

## 2020-02-28 DIAGNOSIS — T38.0X5A STEROID-INDUCED OSTEOPOROSIS: ICD-10-CM

## 2020-02-28 DIAGNOSIS — G89.29 CHRONIC BILATERAL LOW BACK PAIN WITHOUT SCIATICA: ICD-10-CM

## 2020-02-28 DIAGNOSIS — M81.8 STEROID-INDUCED OSTEOPOROSIS: ICD-10-CM

## 2020-02-28 DIAGNOSIS — M05.79 RHEUMATOID ARTHRITIS INVOLVING MULTIPLE SITES WITH POSITIVE RHEUMATOID FACTOR: Chronic | ICD-10-CM

## 2020-02-28 DIAGNOSIS — M81.8 STEROID-INDUCED OSTEOPOROSIS: Chronic | ICD-10-CM

## 2020-02-28 DIAGNOSIS — T38.0X5A STEROID-INDUCED OSTEOPOROSIS: Chronic | ICD-10-CM

## 2020-02-28 DIAGNOSIS — M47.816 SPONDYLOSIS OF LUMBAR REGION WITHOUT MYELOPATHY OR RADICULOPATHY: Primary | ICD-10-CM

## 2020-02-28 DIAGNOSIS — M79.671 FOOT PAIN, RIGHT: ICD-10-CM

## 2020-02-28 PROCEDURE — 3008F BODY MASS INDEX DOCD: CPT | Mod: CPTII,S$GLB,, | Performed by: PHYSICAL MEDICINE & REHABILITATION

## 2020-02-28 PROCEDURE — 99999 PR PBB SHADOW E&M-EST. PATIENT-LVL IV: CPT | Mod: PBBFAC,,, | Performed by: PHYSICAL MEDICINE & REHABILITATION

## 2020-02-28 PROCEDURE — 73630 X-RAY EXAM OF FOOT: CPT | Mod: 26,RT,, | Performed by: RADIOLOGY

## 2020-02-28 PROCEDURE — 3008F PR BODY MASS INDEX (BMI) DOCUMENTED: ICD-10-PCS | Mod: CPTII,S$GLB,, | Performed by: PHYSICAL MEDICINE & REHABILITATION

## 2020-02-28 PROCEDURE — 99999 PR PBB SHADOW E&M-EST. PATIENT-LVL IV: ICD-10-PCS | Mod: PBBFAC,,, | Performed by: PHYSICAL MEDICINE & REHABILITATION

## 2020-02-28 PROCEDURE — 99204 PR OFFICE/OUTPT VISIT, NEW, LEVL IV, 45-59 MIN: ICD-10-PCS | Mod: S$GLB,,, | Performed by: PHYSICAL MEDICINE & REHABILITATION

## 2020-02-28 PROCEDURE — 99204 OFFICE O/P NEW MOD 45 MIN: CPT | Mod: S$GLB,,, | Performed by: PHYSICAL MEDICINE & REHABILITATION

## 2020-02-28 PROCEDURE — 73630 XR FOOT COMPLETE 3 VIEW RIGHT: ICD-10-PCS | Mod: 26,RT,, | Performed by: RADIOLOGY

## 2020-02-28 PROCEDURE — 73630 X-RAY EXAM OF FOOT: CPT | Mod: TC,RT

## 2020-02-28 PROCEDURE — 72110 X-RAY EXAM L-2 SPINE 4/>VWS: CPT | Mod: TC

## 2020-02-28 PROCEDURE — 72170 X-RAY EXAM OF PELVIS: CPT | Mod: 26,,, | Performed by: RADIOLOGY

## 2020-02-28 PROCEDURE — 72170 XR PELVIS ROUTINE AP: ICD-10-PCS | Mod: 26,,, | Performed by: RADIOLOGY

## 2020-02-28 PROCEDURE — 72110 X-RAY EXAM L-2 SPINE 4/>VWS: CPT | Mod: 26,,, | Performed by: RADIOLOGY

## 2020-02-28 PROCEDURE — 72170 X-RAY EXAM OF PELVIS: CPT | Mod: TC

## 2020-02-28 PROCEDURE — 73610 X-RAY EXAM OF ANKLE: CPT | Mod: TC,LT

## 2020-02-28 PROCEDURE — 73610 XR ANKLE COMPLETE 3 VIEW LEFT: ICD-10-PCS | Mod: 26,LT,, | Performed by: RADIOLOGY

## 2020-02-28 PROCEDURE — 72110 XR LUMBAR SPINE 5 VIEW WITH FLEX AND EXT: ICD-10-PCS | Mod: 26,,, | Performed by: RADIOLOGY

## 2020-02-28 PROCEDURE — 73610 X-RAY EXAM OF ANKLE: CPT | Mod: 26,LT,, | Performed by: RADIOLOGY

## 2020-02-28 RX ORDER — TIZANIDINE 2 MG/1
1-2 TABLET ORAL EVERY 6 HOURS PRN
Qty: 90 TABLET | Refills: 2 | Status: SHIPPED | OUTPATIENT
Start: 2020-02-28 | End: 2020-05-25

## 2020-02-28 NOTE — PROGRESS NOTES
Subjective:      Patient ID: Joyce Peterson is a 59 y.o. female.    Chief Complaint: Low-back Pain and Leg Pain    Ms Peterson is a 60 yo female here for evaluation of low back pain.  She was last seen by me on 9/11/2014 to enroll in [facility] for her neck pain.    Her neck pain improved with PT.  She broke 5 bones last year so on multiple osteoporosis meds.  She has been getting bronchitis and so not on arthritis meds.  She broke her right femur.  She started having ankle swelling left greater than right for a couple of weeks.  She has been having buttock pain down the back of the thighs to the knees.  The pain has been present for the past 6 months but worsened 2 weeks ago.  She has a hard time walking.  The pain is getting up in the morning.  She feels like it is grabbing and almost loose control of legs.  She has trouble standing straight in the morning.  The pain is worse at the butt crease, but also in the lower back.  The pain is a tightness and muscles feel tight.  The pain is better with sitting.  The pain is ok with lying still.  The pain is with turning over in bed.  There is always an ache but not always severe.  She just finished finished PT for hip fracture, and this started when that was over.  The pain is 4/10 now, worst 9/10 in the morning with a couple of hours to get relief, best 4/10 sitting or lying down.  She is on kevzara, naprosyn, prednisone daily.  She tries not to take the tramadol.  She has not seen rheum since January.  She increased steroids yesterday and it helped ankle swelling    X-ray thoracic 1/2020  Two views: Alignment is normal.  There is mild DJD.  No fracture dislocation bone bone destruction seen.    Impression      No acute process seen.    MRI hip 9/2019  MRI of the pelvis and left hip from 05/16/2019.    FINDINGS:  Osseous Structures: No acute fracture, avascular necrosis or other abnormality.No marrow signal abnormality to suggest bone marrow replacement process.  There are postsurgical changes of open reduction internal fixation of the left femur.    Hip and Sacroiliac joints: No joint effusion.  Right hip demonstrates full-thickness chondral fissuring and labral fraying.  Left hip suboptimally evaluated due to metallic artifact.    Bursae: No trochanteric or iliopsoas bursitis about the right hip.    Soft Tissues: There is increased T2 signal of the gluteus medius and gluteus minimus tendons at their insertion on the greater trochanter.  The remaining muscles and tendons of the pelvis and both hips show no atrophy, edema, mass, tears or other abnormalities.    Misc: There is enlargement of the right ovary, measuring approximately 2.7 by 2.3 cm.    Impression      1. Tendinosis of the right gluteus medius and gluteus minimus.  2. Mild right hip osteoarthritis with labral fraying.  3. No evidence of right hip stress fracture.  4. Right ovarian enlargement.  Recommend further evaluation with pelvic ultrasound.  This report was flagged in Epic as abnormal.    MRI lumbar and thoracic 2/2017  There is adequate alignment of the thoracic and lumbar vertebral bodies.  The vertebral body heights are adequately maintained.  No evidence of marrow replacement process.  No evidence of acute fracture or subluxation/dislocation.  There is disc desiccation and mild disc space narrowing at L5-S1.     The thoracic spinal cord, conus medullaris, and lumbar spinal nerve roots demonstrate normal signal. No enhancing intradural mass or abnormal leptomeningeal enhancement. No intramedullary cord enhancement.    The soft tissue structures demonstrate a prominent common bile duct measuring 0.8 cm, similar to prior CT exam from 11/14/16.    No focal disc herniation or significant spinal canal or neural foraminal narrowing at any level in the thoracic spine.    L1-L2: No focal disc herniation or significant spinal canal or neural foraminal narrowing.    L2-L3: Circumferential disc bulge (asymmetric to  the left) results in mild left neural foraminal narrowing.    L3-L4: Minimal circumferential disc bulge without significant spinal canal or neural foraminal narrowing.    L4-L5: Circumferential disc bulge and bilateral facet arthropathy (right greater than left) results in mild left neural foraminal narrowing.    L5-S1: There is circumferential disc bulge with small posterior annular fissure, bilateral facet arthropathy, and a 3 mm synovial cyst projecting off the anterior aspect of the facet joint into the neural foramen which results in no significant spinal canal or neural foraminal narrowing.    Impression        Mild lumbar spondylosis as detailed above results in mild neuroforaminal narrowing at varying levels.  No significant spinal canal stenosis in the lumbar spine.    Small annular fissure in the posterior aspect of the L5-S1 disc.    No significant spinal canal or neural foraminal narrowing at any level the thoracic spine.    MRI cervical 2013  No significant alignment abnormality.  Vertebral body heights are normally maintained, without compression deformity at any level.  No significant disc narrowing.  Some very minimal disc bulging is observed at C5-6 and C6-7, but there is no evidence of a   focal disc herniation at any cervical or visualized upper thoracic level.  AP diameter of the spinal canal is well maintained at all levels, with no canal stenosis/extrinsic cord compression evident.  The spinal cord is well delineated from the   cervicomedullary junction to the T4 level, and is normal in size and configuration without focal enlargement or irregularity.  No Chiari malformation or cord cyst or syrinx.  No abnormal signal from the substance of the cord on any of the pulse sequences   generated.  No significant neural foraminal narrowing.  No significant signal abnormality referable to the osseous structures.    Impression     Cervical spine MRI examination demonstrates no significant abnormality.   No evidence of focal disc herniation, significant canal stenosis/extrinsic cord compression, or intramedullary cord lesion.        Past Medical History:  No date: Acid reflux  No date: Allergy  No date: Anemia  No date: Asthma  No date: Coronary artery disease  No date: Degenerative disc disease  No date: Dry eyes  No date: Dry mouth  No date: Gastroparesis  No date: Hyperlipidemia  4/6/2016: Lateral meniscus derangement  No date: Lobular carcinoma in situ  No date: Osteoarthritis  No date: Osteoporosis  No date: Rheumatoid arthritis(714.0)  8/13/2015: Umbilical hernia    Past Surgical History:  01/29/2002: BREAST BIOPSY; Left      Comment:  core bx  05/2017: CARPAL TUNNEL RELEASE; Right  2004: CHOLECYSTECTOMY  No date: COLONOSCOPY      Comment:  10/11  6/29/2017: COLONOSCOPY; N/A      Comment:  Procedure: COLONOSCOPY;  Surgeon: López Moore MD;                 Location: [facility] (4TH FLR);  Service: Endoscopy;                 Laterality: N/A;  5/21/2019: INTRAMEDULLARY RODDING OF FEMUR; Left      Comment:  Procedure: INSERTION, INTRAMEDULLARY ARIEL, FEMUR;                 Surgeon: López Duff MD;  Location: [facility] OR 2ND                FLR;  Service: Orthopedics;  Laterality: Left;  10/17/2018: REPAIR OF TRICEPS TENDON; Left      Comment:  Procedure: REPAIR, TENDON, TRICEPS left elbow;  Surgeon:               Staci Yarbrough MD;  Location: [facility] OR 1ST FLR;                 Service: Orthopedics;  Laterality: Left;  Anesthesia:                General and regional. PRONE, k-wire , hand pan 1                and pan 2, CALL ARTHREX/Tamera notified 10-12 LO  No date: TONSILLECTOMY  2003: TUBAL LIGATION  No date: UPPER GASTROINTESTINAL ENDOSCOPY      Comment:  10/11  2003: uterine ablation    Review of patient's family history indicates:  Problem: Cancer      Relation: Mother          Age of Onset: (Not Specified)          Comment: Lung Cancer  Problem: Emphysema      Relation: Mother          Age of  Onset: (Not Specified)  Problem: Heart attack      Relation: Mother          Age of Onset: (Not Specified)  Problem: Cancer      Relation: Father          Age of Onset: (Not Specified)          Comment: Lung Cancer  Problem: Osteoarthritis      Relation: Father          Age of Onset: (Not Specified)  Problem: Lung cancer      Relation: Father          Age of Onset: (Not Specified)  Problem: Skin cancer      Relation: Father          Age of Onset: (Not Specified)  Problem: Heart disease      Relation: Brother          Age of Onset: (Not Specified)  Problem: Heart attack      Relation: Brother          Age of Onset: (Not Specified)  Problem: Osteoarthritis      Relation: Paternal Aunt          Age of Onset: (Not Specified)  Problem: Retinal detachment      Relation: Maternal Aunt          Age of Onset: (Not Specified)  Problem: No Known Problems      Relation: Sister          Age of Onset: (Not Specified)  Problem: No Known Problems      Relation: Maternal Uncle          Age of Onset: (Not Specified)  Problem: No Known Problems      Relation: Paternal Uncle          Age of Onset: (Not Specified)  Problem: No Known Problems      Relation: Maternal Grandmother          Age of Onset: (Not Specified)  Problem: No Known Problems      Relation: Maternal Grandfather          Age of Onset: (Not Specified)  Problem: No Known Problems      Relation: Paternal Grandmother          Age of Onset: (Not Specified)  Problem: No Known Problems      Relation: Paternal Grandfather          Age of Onset: (Not Specified)  Problem: Colon cancer      Relation: Neg Hx          Age of Onset: (Not Specified)  Problem: Esophageal cancer      Relation: Neg Hx          Age of Onset: (Not Specified)  Problem: Stomach cancer      Relation: Neg Hx          Age of Onset: (Not Specified)  Problem: Celiac disease      Relation: Neg Hx          Age of Onset: (Not Specified)  Problem: Diabetes      Relation: Neg Hx          Age of Onset: (Not  Specified)  Problem: Thyroid disease      Relation: Neg Hx          Age of Onset: (Not Specified)  Problem: Amblyopia      Relation: Neg Hx          Age of Onset: (Not Specified)  Problem: Blindness      Relation: Neg Hx          Age of Onset: (Not Specified)  Problem: Cataracts      Relation: Neg Hx          Age of Onset: (Not Specified)  Problem: Glaucoma      Relation: Neg Hx          Age of Onset: (Not Specified)  Problem: Hypertension      Relation: Neg Hx          Age of Onset: (Not Specified)  Problem: Macular degeneration      Relation: Neg Hx          Age of Onset: (Not Specified)  Problem: Strabismus      Relation: Neg Hx          Age of Onset: (Not Specified)  Problem: Stroke      Relation: Neg Hx          Age of Onset: (Not Specified)      Social History    Socioeconomic History      Marital status:       Spouse name: Not on file      Number of children: Not on file      Years of education: Not on file      Highest education level: Not on file    Occupational History      Not on file    Social Needs      Financial resource strain: Not on file      Food insecurity:        Worry: Not on file        Inability: Not on file      Transportation needs:        Medical: Not on file        Non-medical: Not on file    Tobacco Use      Smoking status: Never Smoker      Smokeless tobacco: Never Used    Substance and Sexual Activity      Alcohol use: Yes        Comment: occasionally      Drug use: No      Sexual activity: Yes        Partners: Male        Birth control/protection: Surgical    Lifestyle      Physical activity:        Days per week: Not on file        Minutes per session: Not on file      Stress: Not on file    Relationships      Social connections:        Talks on phone: Not on file        Gets together: Not on file        Attends Oriental orthodox service: Not on file        Active member of club or organization: Not on file        Attends meetings of clubs or organizations: Not on file         Relationship status: Not on file    Other Topics      Concerns:        Are you pregnant or think you may be?: Not Asked        Breast-feeding: Not Asked    Social History Narrative      Not on file      Current Outpatient Medications:  albuterol (ACCUNEB) 1.25 mg/3 mL Nebu, Take 3 mLs (1.25 mg total) by nebulization every 6 (six) hours as needed. Rescue, Disp: 1 Box, Rfl: 2  albuterol (PROVENTIL HFA) 90 mcg/actuation inhaler, Inhale 2 puffs into the lungs every 6 (six) hours as needed. Rescue, Disp: 20.1 Inhaler, Rfl: 11  amoxicillin (AMOXIL) 500 MG capsule, Take 1 capsule by mouth three times a day., Disp: 30 capsule, Rfl: 0  ampicillin (PRINCIPEN) 500 MG capsule, TAKE 1 CAPUSLE BY MOUTH EVERY 6 HOURS, Disp: , Rfl: 2  aspirin 81 mg Tab, Take 81 mg by mouth every morning. , Disp: , Rfl:   azelastine (ASTELIN) 137 mcg nasal spray, 1 spray (137 mcg total) by Nasal route 2 (two) times daily., Disp: 30 mL, Rfl: 11  azithromycin (Z-DEVON) 250 MG tablet, , Disp: , Rfl:   benzonatate (TESSALON) 100 MG capsule, Take 1 capsule by mouth three times a day, Disp: 9 capsule, Rfl: 0  budesonide-formoterol 160-4.5 mcg (SYMBICORT) 160-4.5 mcg/actuation HFAA, Inhale 2 puffs into the lungs every 12 (twelve) hours., Disp: 3 Inhaler, Rfl: 3  calcium citrate-vitamin D (CITRACAL + D) 315-200 mg-unit per tablet, Take 1 tablet by mouth 2 (two) times daily. , Disp: , Rfl:   diazePAM (VALIUM) 10 MG Tab, Take 1 tablet (10 mg total) by mouth On call Procedure., Disp: 3 tablet, Rfl: 0  diclofenac sodium (VOLTAREN) 1 % Gel, APPLY 2 G TOPICALLY 4 (FOUR) TIMES DAILY AS NEEDED., Disp: 500 g, Rfl: 5  doxycycline (MONODOX) 50 MG Cap, Take 1 capsule (50 mg total) by mouth 2 (two) times daily., Disp: 60 capsule, Rfl: 6  fluconazole (DIFLUCAN) 100 MG tablet, , Disp: , Rfl:   fluticasone propionate (FLONASE) 50 mcg/actuation nasal spray, 2 sprays (100 mcg total) by Each Nostril route once daily., Disp: 9.9 mL, Rfl: 11  GUAIFENESIN (MUCINEX ORAL), Take 400  mg by mouth every 4 (four) hours as needed. , Disp: , Rfl:   INV PROPULSID 10 MG, Take 1 tablets (20 mg) by mouth 4 (four) times daily. FOR INVESTIGATIONAL USE ONLY. Protocol CIS-USA-154, Disp: 1560 each, Rfl: 0  leflunomide (ARAVA) 20 MG Tab, Take 1 tablet (20 mg total) by mouth once daily., Disp: 90 tablet, Rfl: 0  lifitegrast (XIIDRA) 5 % Dpet, Place 1 drop into both eyes 2 (two) times daily., Disp: 60 each, Rfl: 12  methylPREDNISolone (MEDROL DOSEPACK) 4 mg tablet, use as directed, Disp: 1 Package, Rfl: 0  montelukast (SINGULAIR) 10 mg tablet, Take 1 tablet (10 mg total) by mouth nightly., Disp: 90 tablet, Rfl: 3  naproxen (NAPROSYN) 500 MG tablet, TAKE 1 TABLET TWICE A DAY AS NEEDED, Disp: 180 tablet, Rfl: 0  nitrofurantoin, macrocrystal-monohydrate, (MACROBID) 100 MG capsule, , Disp: , Rfl:   omeprazole (PRILOSEC) 40 MG capsule, Take 1 capsule (40 mg total) by mouth every morning., Disp: 30 capsule, Rfl: 6  omeprazole-sodium bicarbonate (ZEGERID) 40-1.1 mg-gram per capsule, TAKE 1 CAPSULE BY MOUTH EVERY MORNING., Disp: 90 capsule, Rfl: 3  POTASSIUM ORAL, Take by mouth once daily., Disp: , Rfl:   PREMARIN 0.625 mg tablet, Take 0.625 mg by mouth once daily., Disp: , Rfl: 4   PREMARIN vaginal cream, PLACE 0.5 GRAM VAGINALLY 3 (THREE) TIMES A WEEK., Disp: 30 g, Rfl: 3  progesterone (PROMETRIUM) 100 MG capsule, Take 100 mg by mouth every morning. 1 Capsule Oral Every day, morning, Disp: , Rfl:   promethazine-dextromethorphan (PROMETHAZINE-DM) 6.25-15 mg/5 mL Syrp, Take 5 mL by mouth every 6 hours as needed, Disp: 140 mL, Rfl: 0  RESTASIS 0.05 % ophthalmic emulsion, PLACE 0.4 MLS (1 DROP TOTAL) INTO BOTH EYES 2 (TWO) TIMES DAILY., Disp: , Rfl: 5  rizatriptan (MAXALT) 10 MG tablet, Take 10 mg by mouth as needed for Migraine. , Disp: , Rfl:   rosuvastatin (CRESTOR) 20 MG tablet, Take 1 tablet (20 mg total) by mouth every evening., Disp: 90 tablet, Rfl: 3  sarilumab (KEVZARA) 200 mg/1.14 mL Syrg, Inject 1.14 mLs (200  "mg total) into the skin every 14 (fourteen) days., Disp: 2.28 mL, Rfl: 2  sucralfate (CARAFATE) 1 gram tablet, TAKE 1 TABLET (1 G TOTAL) BY MOUTH 4 (FOUR) TIMES DAILY., Disp: 360 tablet, Rfl: 3  syringe with needle (TUBERCULIN SYRINGE) 1 mL 27 x 1/2" Syrg, To administer methotrexate 7.5mg sc once a week, Disp: 12 Syringe, Rfl: 3  teriparatide (FORTEO) 20 mcg/dose - 600 mcg/2.4 mL PnIj, Inject 0.08 mLs (20 mcg total) into the skin once daily., Disp: 2.4 mL, Rfl: 11  traMADol (ULTRAM) 50 mg tablet, Take 1 tablet (50 mg total) by mouth every 24 hours as needed for Pain., Disp: 7 tablet, Rfl: 0  valACYclovir (VALTREX) 1000 MG tablet, TAKE 1 TABLET BY MOUTH TWICE A DAY AS NEEDED, Disp: 20 tablet, Rfl: 3    No current facility-administered medications for this visit.   Facility-Administered Medications Ordered in Other Visits:  0.9%  NaCl infusion, , Intravenous, Continuous, Callum Singer MD, Stopped at 05/21/19 1100        Review of patient's allergies indicates:   -- Actemra (tocilizumab) -- Other (See Comments)    --  Severe dizziness   -- Codeine -- Nausea And Vomiting   -- Gold au 198 -- Hives and Rash   -- Hydroxychloroquine -- Other (See Comments)    --  Can't remember the reaction   -- Iodinated contrast media -- Other (See Comments)    --  Other reaction(s): BURNING ALL OVER   -- Iodine -- Other (See Comments)    --  Other reaction(s): BURNING ALL OVER - Iodine dye -             Not topical   -- Sulfa (sulfonamide antibiotics) -- Other (See Comments)    --  Can't remember the reaction   -- Zofran (ondansetron hcl (pf)) -- Nausea And Vomiting    --  Pt reports that last time she received zofran she             started vomiting again   -- Methotrexate analogues -- Nausea Only   -- Pneumovax 23 (pneumococcal 23-argenis ps vaccine) -- Other (See Comments)    --  "sick"        Review of Systems   Constitution: Negative for weight gain and weight loss.   Cardiovascular: Negative for chest pain.   Respiratory: Negative " for shortness of breath.    Musculoskeletal: Positive for back pain and joint pain. Negative for joint swelling.   Gastrointestinal: Negative for abdominal pain, bowel incontinence, nausea and vomiting.   Genitourinary: Negative for bladder incontinence.   Neurological: Negative for numbness and paresthesias.         Objective:        General: Joyce is well-developed, well-nourished, appears stated age, in no acute distress, alert and oriented to time, place and person.     General    Vitals reviewed.  Constitutional: She is oriented to person, place, and time. She appears well-developed and well-nourished.   HENT:   Head: Normocephalic and atraumatic.   Pulmonary/Chest: Effort normal.   Neurological: She is alert and oriented to person, place, and time.   Psychiatric: She has a normal mood and affect. Her behavior is normal. Judgment and thought content normal.     General Musculoskeletal Exam   Gait: antalgic (ankle pain)     Right Ankle/Foot Exam     Tests   Heel Walk: able to perform  Tiptoe Walk: able to perform    Left Ankle/Foot Exam     Tests   Heel Walk: able to perform  Tiptoe Walk: able to perform      Right Hip Exam     Tenderness  Also right side trochanteric tenderness.  Left Hip Exam     Tenderness  Also left side trochanteric tenderness.      Back (L-Spine & T-Spine) / Neck (C-Spine) Exam     Tenderness   The patient is tender to palpation of the right side trochanteric and left side trochanteric. Right paramedian tenderness of the Sacrum. Left paramedian tenderness of the Sacrum.     Back (L-Spine & T-Spine) Range of Motion   Extension: 20   Flexion: 90   Lateral bend right: 20   Lateral bend left: 20   Rotation right: 40   Rotation left: 40     Spinal Sensation   Right Side Sensation  C-Spine Level: normal   L-Spine Level: normal  S-Spine Level: normal  Left Side Sensation  C-Spine Level: normal  L-Spine Level: normal  S-Spine Level: normal    Back (L-Spine & T-Spine) Tests   Right Side  Tests  Straight leg raise:      Sitting SLR: > 70 degrees      Left Side Tests  Straight leg raise:     Sitting SLR: > 70 degrees          Other She has no scoliosis .  Spinal Kyphosis:  Absent      Muscle Strength   Right Upper Extremity   Biceps: 5/5/5   Deltoid:  5/5  Triceps:  5/5  Wrist extension: 5/5/5   Finger Flexors:  5/5  Left Upper Extremity  Biceps: 5/5/5   Deltoid:  5/5  Triceps:  5/5  Wrist extension: 5/5/5   Finger Flexors:  5/5  Right Lower Extremity   Hip Flexion: 5/5   Quadriceps:  5/5   Anterior tibial:  5/5/5  EHL:  5/5  Left Lower Extremity   Hip Flexion: 5/5   Quadriceps:  5/5   Anterior tibial:  5/5/5   EHL:  5/5    Reflexes     Left Side  Biceps:  2+  Triceps:  2+  Brachioradialis:  2+  Quadriceps:  2+  Achilles:  2+  Left Arriaga's Sign:  Absent  Babinski Sign:  absent    Right Side   Biceps:  2+  Triceps:  2+  Brachioradialis:  2+  Quadriceps:  2+  Achilles:  2+  Right Arriaga's Sign:  absent  Babinski Sign:  absent    Vascular Exam     Right Pulses        Carotid:                  2+    Left Pulses        Carotid:                  2+              Assessment:       1. Spondylosis of lumbar region without myelopathy or radiculopathy    2. Chronic bilateral low back pain without sciatica    3. Acute left ankle pain    4. Foot pain, right    5. Rheumatoid arthritis involving multiple sites with positive rheumatoid factor    6. Steroid-induced osteoporosis           Plan:       Orders Placed This Encounter    Procedure Order to [facility]    X-Ray Pelvis Routine AP    X-Ray Lumbar Complete With Flex And Ext    X-Ray Ankle Complete Left    X-Ray Foot Complete Right    Ambulatory referral/consult to Physical/Occupational Therapy    tiZANidine (ZANAFLEX) 2 MG tablet     1. We discussed back pain and the nature of back pain.  We discussed that it is not one thing that causes the pain but an accumulation of multiple things that we do.  She does have some facet arthropathy that  could be causing her pain, she also has had multiple fractures from osteoporosis, so will look at imaging  2. We discussed gabapentin but she is really sensitive to meds  3. We discussed the benefits of therapy and exercise and continuing to move.  4. X-ray bilateral ankles pelvis and lower back  5. Tizanidine 2-4mg po TID  6. Talk to rheumatology, and let them know about increased pain and inflammation  7. Podiatry for ankle pain on left and foot pain on right with swelling  8. Pain management for L4-5 and L5-S1 facet injections  9. PT for back and core strengthening, flexion exercises and Si joint mobilization and HEP  10. We discussed gabapentin but made her sleepy in the past  11. RTC 4 weeks      More than 50% of the total time  of 45 minutes was spent face to face in counseling on diagnosis and treatment options. I also counseled patient  on common and most usual side effect of prescribed medications.  I reviewed Primary care , and other specialty's notes to better coordinate patient's care. All questions were answered, and patient voiced understanding.         Follow-up: Follow up in about 4 weeks (around 3/27/2020). If there are any questions prior to this, the patient was instructed to contact the office.

## 2020-02-28 NOTE — PROGRESS NOTES
Chart reviewed.   Immunizations: Triggered Imm Registry     Orders placed: n/a  Upcoming appts to satisfy MIKE topics: n/a

## 2020-02-29 ENCOUNTER — PATIENT MESSAGE (OUTPATIENT)
Dept: SPINE | Facility: CLINIC | Age: 60
End: 2020-02-29

## 2020-03-02 ENCOUNTER — TELEPHONE (OUTPATIENT)
Dept: PAIN MEDICINE | Facility: CLINIC | Age: 60
End: 2020-03-02

## 2020-03-02 DIAGNOSIS — M47.816 SPONDYLOSIS OF LUMBAR REGION WITHOUT MYELOPATHY OR RADICULOPATHY: Primary | ICD-10-CM

## 2020-03-03 ENCOUNTER — OFFICE VISIT (OUTPATIENT)
Dept: RHEUMATOLOGY | Facility: CLINIC | Age: 60
End: 2020-03-03
Payer: MEDICARE

## 2020-03-03 VITALS
HEIGHT: 64 IN | BODY MASS INDEX: 27.83 KG/M2 | SYSTOLIC BLOOD PRESSURE: 142 MMHG | WEIGHT: 163 LBS | HEART RATE: 113 BPM | DIASTOLIC BLOOD PRESSURE: 85 MMHG

## 2020-03-03 DIAGNOSIS — M05.79 RHEUMATOID ARTHRITIS INVOLVING MULTIPLE SITES WITH POSITIVE RHEUMATOID FACTOR: Chronic | ICD-10-CM

## 2020-03-03 DIAGNOSIS — G89.29 CHRONIC BILATERAL LOW BACK PAIN WITHOUT SCIATICA: Primary | ICD-10-CM

## 2020-03-03 DIAGNOSIS — M54.50 CHRONIC BILATERAL LOW BACK PAIN WITHOUT SCIATICA: Primary | ICD-10-CM

## 2020-03-03 PROCEDURE — 3008F PR BODY MASS INDEX (BMI) DOCUMENTED: ICD-10-PCS | Mod: CPTII,S$GLB,, | Performed by: INTERNAL MEDICINE

## 2020-03-03 PROCEDURE — 99213 PR OFFICE/OUTPT VISIT, EST, LEVL III, 20-29 MIN: ICD-10-PCS | Mod: S$GLB,,, | Performed by: INTERNAL MEDICINE

## 2020-03-03 PROCEDURE — 99213 OFFICE O/P EST LOW 20 MIN: CPT | Mod: S$GLB,,, | Performed by: INTERNAL MEDICINE

## 2020-03-03 PROCEDURE — 99999 PR PBB SHADOW E&M-EST. PATIENT-LVL V: ICD-10-PCS | Mod: PBBFAC,,, | Performed by: INTERNAL MEDICINE

## 2020-03-03 PROCEDURE — 3008F BODY MASS INDEX DOCD: CPT | Mod: CPTII,S$GLB,, | Performed by: INTERNAL MEDICINE

## 2020-03-03 PROCEDURE — 99999 PR PBB SHADOW E&M-EST. PATIENT-LVL V: CPT | Mod: PBBFAC,,, | Performed by: INTERNAL MEDICINE

## 2020-03-03 RX ORDER — DIAZEPAM 5 MG/1
5 TABLET ORAL EVERY 6 HOURS PRN
Qty: 2 TABLET | Refills: 0 | Status: SHIPPED | OUTPATIENT
Start: 2020-03-03 | End: 2020-07-20

## 2020-03-03 ASSESSMENT — ROUTINE ASSESSMENT OF PATIENT INDEX DATA (RAPID3)
PSYCHOLOGICAL DISTRESS SCORE: 2.2
PATIENT GLOBAL ASSESSMENT SCORE: 9
TOTAL RAPID3 SCORE: 8.39
AM STIFFNESS SCORE: 1, YES
PAIN SCORE: 9.5
MDHAQ FUNCTION SCORE: 2
FATIGUE SCORE: 8.5

## 2020-03-03 NOTE — ASSESSMENT & PLAN NOTE
Cont sarilumab 200mg q 2 wks  Keep reg scheduled appt  If no improvement on sustained sarilumab, trial of upadacitinib

## 2020-03-03 NOTE — ASSESSMENT & PLAN NOTE
Symptoms reproduced by lumbar extension only  Hips with full painless rom, Fabere neg, + tender gr troch  Normal neuro exam, no red flags    Lumbar MRI  Prior to scheduled LESI ordered by Dr. Woodward  Agree with Dr. Everett evaluation

## 2020-03-03 NOTE — PROGRESS NOTES
Subjective:       Patient ID: Joyce Peterson is a 59 y.o. female.    Chief Complaint: Acute low back pain x 2wks; RA; secondary Sjogren's; hypogammaglobulinemia; Osteoporosis/osteopenia with elevated FRAX  HPI   Resumed sarilumab once then off again, back on sarilumab x 2 wks, but now brewing infection wants to stay on. For past 2 wks, diffuse pelvic girdle pain, low back pain no radiation into legs no neurologic symptoms.   Comprehensive Initial Assessment  Pain level: see MHAQ  Functional status: see MHAQ  Patient has:     Previous history malignancy: No   Weight loss: No   Previous longstanding steroid use or immunosuppression: NO    Recent significant infection: No   Fracture or suspected fracture: No   Bowel or bladder incontinence: No    Prior treatment and response: none  Employment status: none  Are radicular symptoms present? no  Physical examination  Straight leg raise test b/l : negative  Ankle and knee reflexes: normal  Quadriceps; ankle and great toe dorsiflexion and plantar flexion strength: Normal  Pulses in lower extremities: Normal      Treatment Plan  Advised against bed rest: Yes  Advised normal activity as tolerated: Yes    Review of Systems   Constitutional: Positive for fatigue (8.5/10). Negative for appetite change, fever and unexpected weight change.   HENT: Negative for mouth sores.         Dry mouth   Eyes: Positive for redness. Negative for visual disturbance.        Dry   Respiratory: Positive for cough and shortness of breath. Negative for wheezing.    Cardiovascular: Negative for chest pain and palpitations.   Gastrointestinal: Negative for abdominal pain, anal bleeding, blood in stool, constipation, diarrhea, nausea and vomiting.   Genitourinary: Negative for dysuria, frequency and urgency.   Musculoskeletal: Negative for arthralgias, back pain, gait problem, joint swelling, myalgias, neck pain and neck stiffness.   Skin: Negative for rash.   Neurological: Negative for  "weakness, numbness and headaches.   Hematological: Negative for adenopathy. Does not bruise/bleed easily.   Psychiatric/Behavioral: Negative for sleep disturbance. The patient is not nervous/anxious.        Increased lbp with extension; normal flex, lat flex  Objective:   BP (!) 142/85   Pulse (!) 113   Ht 5' 3.6" (1.615 m)   Wt 73.9 kg (163 lb)   LMP  (LMP Unknown)   BMI 28.33 kg/m²      Physical Exam       Right Side Rheumatological Exam     Hip Exam   Tenderness Location: greater trochanter    Range of Motion   The patient has normal right hip ROM.    Muscle Strength (0-5 scale):  Iliopsoas: 5  Quadriceps:  5   Distal Lower Extremity: 5    Left Side Rheumatological Exam     Hip Exam   Tenderness Location: greater trochanter    Range of Motion   The patient has normal left hip ROM.    Muscle Strength (0-5 scale):  Iliopsoas: 5  Quadriceps:  5   Distal Lower Extremity: 5      Neurological:   Reflex Scores:       Patellar reflexes are 2+ on the right side and 2+ on the left side.       Achilles reflexes are 1+ on the right side.        Assessment/Plan         Chronic bilateral low back pain without sciatica  -     MRI Lumbar Spine Without Contrast; Future; Expected date: 03/03/2020    Rheumatoid arthritis involving multiple sites with positive rheumatoid factor    Other orders  -     diazePAM (VALIUM) 5 MG tablet; Take 1 tablet (5 mg total) by mouth every 6 (six) hours as needed for Anxiety.  Dispense: 2 tablet; Refill: 0       Problem List Items Addressed This Visit     Low back pain - Primary    Current Assessment & Plan     Symptoms reproduced by lumbar extension only  Hips with full painless rom, Fabere neg, + tender gr troch  Normal neuro exam, no red flags    Lumbar MRI  Prior to scheduled LESI ordered by Dr. Woodward  Agree with Dr. Everett evaluation         Relevant Orders    MRI Lumbar Spine Without Contrast    Rheumatoid arthritis involving multiple sites (Chronic)    Overview     erosive RA " discontinued long term methotrexate b/o nausea with po and sc and reduced dose, allergic to sulfa, and had reaction to hydroxychloroquine;   tolerating leflunomide and tofacitinib.  Has failed 3 TNFi(infliximab, etanercept, adalimumab), abatacept, hypogamma with rituximab and gets frequent infections in any event, acute vertigo/dizziness with tocilizumab x2           Current Assessment & Plan     Cont sarilumab 200mg q 2 wks  Keep reg scheduled appt  If no improvement on sustained sarilumab, trial of upadacitinib

## 2020-03-05 ENCOUNTER — PATIENT OUTREACH (OUTPATIENT)
Dept: ADMINISTRATIVE | Facility: OTHER | Age: 60
End: 2020-03-05

## 2020-03-09 ENCOUNTER — TELEPHONE (OUTPATIENT)
Dept: PAIN MEDICINE | Facility: CLINIC | Age: 60
End: 2020-03-09

## 2020-03-09 ENCOUNTER — OFFICE VISIT (OUTPATIENT)
Dept: PODIATRY | Facility: CLINIC | Age: 60
End: 2020-03-09
Payer: MEDICARE

## 2020-03-09 VITALS
DIASTOLIC BLOOD PRESSURE: 63 MMHG | SYSTOLIC BLOOD PRESSURE: 110 MMHG | BODY MASS INDEX: 28.52 KG/M2 | WEIGHT: 160.94 LBS | HEART RATE: 103 BPM | HEIGHT: 63 IN

## 2020-03-09 DIAGNOSIS — M84.474S METATARSAL FRACTURE, PATHOLOGIC, RIGHT, SEQUELA: Primary | ICD-10-CM

## 2020-03-09 DIAGNOSIS — M76.821 POSTERIOR TIBIAL TENDON DYSFUNCTION (PTTD) OF BOTH LOWER EXTREMITIES: ICD-10-CM

## 2020-03-09 DIAGNOSIS — M06.9 RHEUMATOID ARTHRITIS INVOLVING MULTIPLE SITES, UNSPECIFIED RHEUMATOID FACTOR PRESENCE: ICD-10-CM

## 2020-03-09 DIAGNOSIS — Z87.312 HISTORY OF STRESS FRACTURE: ICD-10-CM

## 2020-03-09 DIAGNOSIS — M76.822 POSTERIOR TIBIAL TENDON DYSFUNCTION (PTTD) OF BOTH LOWER EXTREMITIES: ICD-10-CM

## 2020-03-09 DIAGNOSIS — R26.9 GAIT ABNORMALITY: ICD-10-CM

## 2020-03-09 PROCEDURE — 3008F PR BODY MASS INDEX (BMI) DOCUMENTED: ICD-10-PCS | Mod: CPTII,S$GLB,, | Performed by: PODIATRIST

## 2020-03-09 PROCEDURE — 99213 OFFICE O/P EST LOW 20 MIN: CPT | Mod: S$GLB,,, | Performed by: PODIATRIST

## 2020-03-09 PROCEDURE — 99999 PR PBB SHADOW E&M-EST. PATIENT-LVL IV: CPT | Mod: PBBFAC,,, | Performed by: PODIATRIST

## 2020-03-09 PROCEDURE — 3008F BODY MASS INDEX DOCD: CPT | Mod: CPTII,S$GLB,, | Performed by: PODIATRIST

## 2020-03-09 PROCEDURE — 99999 PR PBB SHADOW E&M-EST. PATIENT-LVL IV: ICD-10-PCS | Mod: PBBFAC,,, | Performed by: PODIATRIST

## 2020-03-09 PROCEDURE — 99213 PR OFFICE/OUTPT VISIT, EST, LEVL III, 20-29 MIN: ICD-10-PCS | Mod: S$GLB,,, | Performed by: PODIATRIST

## 2020-03-09 RX ORDER — PREDNISONE 1 MG/1
TABLET ORAL
COMMUNITY
Start: 2020-03-08 | End: 2020-04-09

## 2020-03-09 NOTE — PROGRESS NOTES
Chief Complaint   Patient presents with    Foot Injury     right           HPI:       Joyce Peterson is a 59 y.o. female who presents to clinic for chronic right foot pain.  She does not recall acute trauma to the area.   She has history of Rheumatoid Arthritis,  Patient has history of multiple idiopathic metatarsal fractures of the right foot.   She is on Prednisone.    She has difficulty walking.  There is pain at the midfoot when walking, even with athletic shoes.  range of motion is limited.   Notices swelling to the left ankle and right foot for about two weeks.  Saw Dr. Woodward.  Here for follow up.           Patient Active Problem List   Diagnosis    Osteoarthritis    GERD (gastroesophageal reflux disease)    Gastroparesis    Steroid-induced osteoporosis    Coronary artery disease    Lumbar spondylosis    CS (cervical spondylosis)    Thyroid nodule    Hyperlipidemia    Uncomplicated asthma    AR (allergic rhinitis)    Immunosuppressed status    Rheumatoid arthritis involving multiple sites    Low back pain    Carpal tunnel syndrome of right wrist    Elevated parathyroid hormone    Hypogammaglobulinemia    Right carpal tunnel syndrome    Pain in right hand    Pain in right elbow    Decreased  strength of right hand    Right shoulder pain    Iron deficiency anemia due to chronic blood loss    Pure hypercholesterolemia    Research study patient    Sjogren's syndrome    Esophageal candidiasis    Rupture of left triceps tendon    Left hip pain    Gait abnormality    Greater trochanteric bursitis of left hip    Stress fracture of neck of left femur    Stress fracture of neck of femur with delayed healing    Coronary artery disease involving native coronary artery of native heart without angina pectoris    Buttock pain    Difficulty walking    ROBERT (iron deficiency anemia)    Subclinical hyperthyroidism               Current Outpatient Medications on File Prior to Visit    Medication Sig Dispense Refill    albuterol (ACCUNEB) 1.25 mg/3 mL Nebu Take 3 mLs (1.25 mg total) by nebulization every 6 (six) hours as needed. Rescue 1 Box 2    albuterol (PROVENTIL HFA) 90 mcg/actuation inhaler Inhale 2 puffs into the lungs every 6 (six) hours as needed. Rescue 20.1 Inhaler 11    aspirin 81 mg Tab Take 81 mg by mouth every morning.       azelastine (ASTELIN) 137 mcg nasal spray 1 spray (137 mcg total) by Nasal route 2 (two) times daily. 30 mL 11    benzonatate (TESSALON) 100 MG capsule Take 1 capsule by mouth three times a day 9 capsule 0    budesonide-formoterol 160-4.5 mcg (SYMBICORT) 160-4.5 mcg/actuation HFAA Inhale 2 puffs into the lungs every 12 (twelve) hours. 3 Inhaler 3    calcium citrate-vitamin D (CITRACAL + D) 315-200 mg-unit per tablet Take 1 tablet by mouth 2 (two) times daily.       diazePAM (VALIUM) 5 MG tablet Take 1 tablet (5 mg total) by mouth every 6 (six) hours as needed for Anxiety. 2 tablet 0    diclofenac sodium (VOLTAREN) 1 % Gel APPLY 2 G TOPICALLY 4 (FOUR) TIMES DAILY AS NEEDED. 500 g 5    fluconazole (DIFLUCAN) 100 MG tablet       fluticasone propionate (FLONASE) 50 mcg/actuation nasal spray 2 sprays (100 mcg total) by Each Nostril route once daily. 9.9 mL 11    GUAIFENESIN (MUCINEX ORAL) Take 400 mg by mouth every 4 (four) hours as needed.       INV PROPULSID 10 MG Take 1 tablets (20 mg) by mouth 4 (four) times daily. FOR INVESTIGATIONAL USE ONLY. Protocol CIS-USA-154 1560 each 0    leflunomide (ARAVA) 20 MG Tab Take 1 tablet (20 mg total) by mouth once daily. 90 tablet 0    lifitegrast (XIIDRA) 5 % Dpet Place 1 drop into both eyes 2 (two) times daily. 60 each 12    methylPREDNISolone (MEDROL DOSEPACK) 4 mg tablet use as directed 1 Package 0    montelukast (SINGULAIR) 10 mg tablet Take 1 tablet (10 mg total) by mouth nightly. 90 tablet 3    naproxen (NAPROSYN) 500 MG tablet TAKE 1 TABLET TWICE A DAY AS NEEDED 180 tablet 0    nitrofurantoin,  "macrocrystal-monohydrate, (MACROBID) 100 MG capsule       omeprazole (PRILOSEC) 40 MG capsule Take 1 capsule (40 mg total) by mouth every morning. 30 capsule 6    omeprazole-sodium bicarbonate (ZEGERID) 40-1.1 mg-gram per capsule TAKE 1 CAPSULE BY MOUTH EVERY MORNING. 90 capsule 3    POTASSIUM ORAL Take by mouth once daily.      predniSONE (DELTASONE) 1 MG tablet       PREMARIN 0.625 mg tablet Take 0.625 mg by mouth once daily.  4    PREMARIN vaginal cream PLACE 0.5 GRAM VAGINALLY 3 (THREE) TIMES A WEEK. 30 g 3    progesterone (PROMETRIUM) 100 MG capsule Take 100 mg by mouth every morning. 1 Capsule Oral Every day, morning      RESTASIS 0.05 % ophthalmic emulsion PLACE 0.4 MLS (1 DROP TOTAL) INTO BOTH EYES 2 (TWO) TIMES DAILY.  5    rizatriptan (MAXALT) 10 MG tablet Take 10 mg by mouth as needed for Migraine.       rosuvastatin (CRESTOR) 20 MG tablet Take 1 tablet (20 mg total) by mouth every evening. 90 tablet 3    sarilumab (KEVZARA) 200 mg/1.14 mL Syrg Inject 1.14 mLs (200 mg total) into the skin every 14 (fourteen) days. 2.28 mL 2    sucralfate (CARAFATE) 1 gram tablet TAKE 1 TABLET (1 G TOTAL) BY MOUTH 4 (FOUR) TIMES DAILY. 360 tablet 3    syringe with needle (TUBERCULIN SYRINGE) 1 mL 27 x 1/2" Syrg To administer methotrexate 7.5mg sc once a week 12 Syringe 3    teriparatide (FORTEO) 20 mcg/dose - 600 mcg/2.4 mL PnIj Inject 0.08 mLs (20 mcg total) into the skin once daily. 2.4 mL 11    tiZANidine (ZANAFLEX) 2 MG tablet Take 0.5-1 tablets (1-2 mg total) by mouth every 6 (six) hours as needed. 90 tablet 2    traMADol (ULTRAM) 50 mg tablet Take 1 tablet (50 mg total) by mouth every 24 hours as needed for Pain. 7 tablet 0    valACYclovir (VALTREX) 1000 MG tablet TAKE 1 TABLET BY MOUTH TWICE A DAY AS NEEDED 20 tablet 3    doxycycline (MONODOX) 50 MG Cap Take 1 capsule (50 mg total) by mouth 2 (two) times daily. (Patient not taking: Reported on 3/9/2020) 60 capsule 6     Current " "Facility-Administered Medications on File Prior to Visit   Medication Dose Route Frequency Provider Last Rate Last Dose    0.9%  NaCl infusion   Intravenous Continuous Callum Singer MD   Stopped at 05/21/19 1100           Review of patient's allergies indicates:   Allergen Reactions    Actemra [tocilizumab] Other (See Comments)     Severe dizziness    Codeine Nausea And Vomiting    Gold au 198 Hives and Rash    Hydroxychloroquine Other (See Comments)     Can't remember the reaction      Iodinated contrast media - oral and iv dye Other (See Comments)     Other reaction(s): BURNING ALL OVER    Iodine Other (See Comments)     Other reaction(s): BURNING ALL OVER - Iodine dye - Not topical    Sulfa (sulfonamide antibiotics) Other (See Comments)     Can't remember the reaction    Zofran [ondansetron hcl (pf)] Nausea And Vomiting     Pt reports that last time she received zofran she started vomiting again    Methotrexate analogues Nausea Only    Pneumovax 23 [pneumococcal 23-argenis ps vaccine] Other (See Comments)     "sick"           Social History     Socioeconomic History    Marital status:      Spouse name: Not on file    Number of children: Not on file    Years of education: Not on file    Highest education level: Not on file   Occupational History    Not on file   Social Needs    Financial resource strain: Not on file    Food insecurity:     Worry: Not on file     Inability: Not on file    Transportation needs:     Medical: Not on file     Non-medical: Not on file   Tobacco Use    Smoking status: Never Smoker    Smokeless tobacco: Never Used   Substance and Sexual Activity    Alcohol use: Yes     Comment: occasionally    Drug use: No    Sexual activity: Yes     Partners: Male     Birth control/protection: Surgical   Lifestyle    Physical activity:     Days per week: Not on file     Minutes per session: Not on file    Stress: Not on file   Relationships    Social connections:     " "Talks on phone: Not on file     Gets together: Not on file     Attends Yarsanism service: Not on file     Active member of club or organization: Not on file     Attends meetings of clubs or organizations: Not on file     Relationship status: Not on file   Other Topics Concern    Are you pregnant or think you may be? Not Asked    Breast-feeding Not Asked   Social History Narrative    Not on file           ROS:  General ROS: negative for  chills, fatigue or fever  Cardiovascular ROS: no chest pain or dyspnea on exertion  Musculoskeletal ROS: negative for joint pain or joint stiffness.  Negative for loss of strength.  Positive for foot pain.   Neuro ROS: Negative for syncope, numbness, or muscle weakness  Skin ROS: Negative for rash, itching or nail/hair changes.           OBJECTIVE:         Vitals:    03/09/20 1443   BP: 110/63   BP Location: Right arm   Patient Position: Sitting   BP Method: Medium (Automatic)   Pulse: 103   Weight: 73 kg (160 lb 15 oz)   Height: 5' 3" (1.6 m)        Bilateral  Lower extremity exam:    Vasc:   Palpable pedal pulses. Feet appropriately warm to touch. Cap refill time is within normal limits.  There is minimal edema to the right forefoot and lateral left ankle.        Neurological:  Light touch, proprioception, and Sharp/dull sensation are all intact.   There is no Tinel's along the tarsal tunnel.      Negative genesis's.  No sensorimotor deficitis.     Derm:   No open lesions, macerations, or rashes.  No bruising.  No open wounds.     Redness over the bilateral bunions and tailor's bunions      MSK:  Mild  tenderness to palpation at the midfoot. And lateral malleolar area of the left foot.  bilateral bunions and tailors bunion with redness 2/2 shoe irritation, worse on the right.    Limited 1st metatarso-phalangeal joint range of motion.   bilateral adductovarus 5th toes.   bilateral pes planus. With pain along the PT tendon left > right       8/21/19  FINDINGS:  Healing fractures of " the 3rd, 4th and 5th metatarsal bones identified.  The position alignment is unchanged as compared to the previous study.  DJD, hallux valgus and bunion formation.  Small calcification noted the adjacent to the 1st metatarsophalangeal joint medially.  Pes planus.      9/11/19  Impression       No significant change in appearance of the 3rd and 4th metatarsal fractures with callus formations and persistent lucent fracture line.    Unchanged appearance of the 5th metatarsal fracture which appears well healed.       10/31/19   Impression       There is a 2nd metatarsal fracture which was suggested on 09/20/2019 MRI.  There has been small amount of callus formation and no significant displacement.    Similar appearance of the well callused 3rd through 5th metatarsal fractures.    Degenerative changes of the foot as above.         Xrays 1/6/2020  COMPARISON:  10/31/2019   FINDINGS:  Healing fractures of the 2nd through 5th metatarsal bones identified.  The position alignment is satisfactory and unchanged as compared to the previous study.  DJD and hallux valgus.  Pes planus.        ASSESSMENT/PLAN:        Metatarsal fracture, pathologic, right, sequela    History of stress fracture    Rheumatoid arthritis involving multiple sites, unspecified rheumatoid factor presence    Gait abnormality    Posterior tibial tendon dysfunction (PTTD) of both lower extremities         · I counseled the patient on the patient's conditions, their implications and medical management.  Xrays reviewed with the patient in the exam room.   · Consider custom foot orthotics vs rocker bottom shoes.  She will look into this.   She hasn't gotten them since it was discussed last visit.   · Otherwise, athletic shoes, well padded soles only.  Never barefoot walking.      · Monitor for worsening signs and symptoms.  Patient is amenable to plan.

## 2020-03-09 NOTE — TELEPHONE ENCOUNTER
----- Message from Rina Fuller MD sent at 3/9/2020  1:34 PM CDT -----  Patient is scheduled in [place], but she lives in [place].  Please make sure she knows this appointment is at our [facility] clinic.  Please offer to reschedule her in [place] if needed.    Thanks!  LW

## 2020-03-09 NOTE — TELEPHONE ENCOUNTER
"Staff contacted the patient regarding her appointment with Dr. Fuller.     Patient asked that we cancel the New Patient appointment visit being that the patient was scheduled as a direct referral with Dr. Abraham by Dr. Woodward and the patient does not understanding why she is scheduled for a office visit with Dr. Fuller.     Patient asked to cancel at this time and states, "I will call back to schedule if I feel I need care long term form your office.       "

## 2020-03-10 ENCOUNTER — HOSPITAL ENCOUNTER (OUTPATIENT)
Dept: RADIOLOGY | Facility: HOSPITAL | Age: 60
Discharge: HOME | End: 2020-03-10
Attending: INTERNAL MEDICINE
Payer: MEDICARE

## 2020-03-10 ENCOUNTER — TELEPHONE (OUTPATIENT)
Dept: PHARMACY | Facility: CLINIC | Age: 60
End: 2020-03-10

## 2020-03-10 DIAGNOSIS — M54.50 CHRONIC BILATERAL LOW BACK PAIN WITHOUT SCIATICA: ICD-10-CM

## 2020-03-10 DIAGNOSIS — G89.29 CHRONIC BILATERAL LOW BACK PAIN WITHOUT SCIATICA: ICD-10-CM

## 2020-03-10 PROCEDURE — 72148 MRI LUMBAR SPINE W/O DYE: CPT | Mod: TC

## 2020-03-10 PROCEDURE — 72148 MRI LUMBAR SPINE WITHOUT CONTRAST: ICD-10-PCS | Mod: 26,,, | Performed by: RADIOLOGY

## 2020-03-10 PROCEDURE — 72148 MRI LUMBAR SPINE W/O DYE: CPT | Mod: 26,,, | Performed by: RADIOLOGY

## 2020-03-10 NOTE — TELEPHONE ENCOUNTER
refill attempt for forteo, pt also needs refill for kevzara. kevzara is [facility] next fill date is on 3/12, pt would like both shipped on same date. [facility] will follow up on 3/13 to schedule. confirmed the need for refill of forteo, shipped to confirmed address on 3/12 to arrive on 3/13. verified 2 pt. identifier, pt. states she is not sure due to the bottles has no marks to count dose(s) on hand, pt guess that it's about 5 daily doses. pt reports no missed doses, no new medications or allergies and no questions for the pharmacist at this time.

## 2020-03-12 ENCOUNTER — HOSPITAL ENCOUNTER (OUTPATIENT)
Facility: OTHER | Age: 60
Discharge: HOME | End: 2020-03-12
Attending: ANESTHESIOLOGY | Admitting: ANESTHESIOLOGY
Payer: MEDICARE

## 2020-03-12 VITALS
TEMPERATURE: 98 F | DIASTOLIC BLOOD PRESSURE: 78 MMHG | HEART RATE: 82 BPM | SYSTOLIC BLOOD PRESSURE: 134 MMHG | RESPIRATION RATE: 18 BRPM | OXYGEN SATURATION: 96 %

## 2020-03-12 DIAGNOSIS — G89.29 CHRONIC PAIN: ICD-10-CM

## 2020-03-12 DIAGNOSIS — M47.816 LUMBAR SPONDYLOSIS: Primary | ICD-10-CM

## 2020-03-12 PROCEDURE — 64493 PR INJ DX/THER AGNT PARAVERT FACET JOINT,IMG GUIDE,LUMBAR/SAC,1ST LVL: ICD-10-PCS | Mod: 50,,, | Performed by: ANESTHESIOLOGY

## 2020-03-12 PROCEDURE — 64494 INJ PARAVERT F JNT L/S 2 LEV: CPT | Mod: 50 | Performed by: ANESTHESIOLOGY

## 2020-03-12 PROCEDURE — 25000003 PHARM REV CODE 250: Performed by: ANESTHESIOLOGY

## 2020-03-12 PROCEDURE — 64494 INJ PARAVERT F JNT L/S 2 LEV: CPT | Mod: LT,,, | Performed by: ANESTHESIOLOGY

## 2020-03-12 PROCEDURE — 25500020 PHARM REV CODE 255: Performed by: ANESTHESIOLOGY

## 2020-03-12 PROCEDURE — 64494 PR INJ DX/THER AGNT PARAVERT FACET JOINT,IMG GUIDE,LUMBAR/SAC, 2ND LEVEL: ICD-10-PCS | Mod: LT,,, | Performed by: ANESTHESIOLOGY

## 2020-03-12 PROCEDURE — 64493 INJ PARAVERT F JNT L/S 1 LEV: CPT | Mod: 50,,, | Performed by: ANESTHESIOLOGY

## 2020-03-12 PROCEDURE — 63600175 PHARM REV CODE 636 W HCPCS: Performed by: ANESTHESIOLOGY

## 2020-03-12 PROCEDURE — 64493 INJ PARAVERT F JNT L/S 1 LEV: CPT | Mod: 50 | Performed by: ANESTHESIOLOGY

## 2020-03-12 RX ORDER — DIPHENHYDRAMINE HYDROCHLORIDE 50 MG/ML
25 INJECTION INTRAMUSCULAR; INTRAVENOUS ONCE
Status: COMPLETED | OUTPATIENT
Start: 2020-03-12 | End: 2020-03-12

## 2020-03-12 RX ORDER — TRIAMCINOLONE ACETONIDE 40 MG/ML
INJECTION, SUSPENSION INTRA-ARTICULAR; INTRAMUSCULAR
Status: DISCONTINUED | OUTPATIENT
Start: 2020-03-12 | End: 2020-03-12 | Stop reason: HOSPADM

## 2020-03-12 RX ORDER — SODIUM CHLORIDE 9 MG/ML
500 INJECTION, SOLUTION INTRAVENOUS CONTINUOUS
Status: DISCONTINUED | OUTPATIENT
Start: 2020-03-12 | End: 2020-03-12 | Stop reason: HOSPADM

## 2020-03-12 RX ORDER — ALPRAZOLAM 0.5 MG/1
1 TABLET ORAL ONCE
Status: COMPLETED | OUTPATIENT
Start: 2020-03-12 | End: 2020-03-12

## 2020-03-12 RX ORDER — LIDOCAINE HYDROCHLORIDE 10 MG/ML
INJECTION INFILTRATION; PERINEURAL
Status: DISCONTINUED | OUTPATIENT
Start: 2020-03-12 | End: 2020-03-12 | Stop reason: HOSPADM

## 2020-03-12 RX ORDER — BUPIVACAINE HYDROCHLORIDE 2.5 MG/ML
INJECTION, SOLUTION EPIDURAL; INFILTRATION; INTRACAUDAL
Status: DISCONTINUED | OUTPATIENT
Start: 2020-03-12 | End: 2020-03-12 | Stop reason: HOSPADM

## 2020-03-12 RX ADMIN — DIPHENHYDRAMINE HYDROCHLORIDE 25 MG: 50 INJECTION INTRAMUSCULAR; INTRAVENOUS at 08:03

## 2020-03-12 RX ADMIN — ALPRAZOLAM 1 MG: 0.5 TABLET ORAL at 07:03

## 2020-03-12 NOTE — TELEPHONE ENCOUNTER
confirmed the need for refill of kevzara and forteo, copay 49.89. shipped to confirmed address on 3/12 to arrive on 3/13. verified 2 pt. identifier, pt. states has 3 daily doses on hand of forteo, and 0 doses on hand of kevzara for 3/18, and next dose on 4/1.

## 2020-03-12 NOTE — H&P
HPI  Patient presenting for Procedure(s) (LRB):  FACET JOINT INJECTION (LUMBAR BLOCK) BILATERAL L4-5 AND L5-S1 DIRECT REFERRAL (Bilateral)     Patient on Anti-coagulation No    No health changes since previous encounter    Past Medical History:   Diagnosis Date    Acid reflux     Allergy     Anemia     Asthma     Coronary artery disease     Degenerative disc disease     Dry eyes     Dry mouth     Gastroparesis     Hyperlipidemia     Lateral meniscus derangement 4/6/2016    Lobular carcinoma in situ     Osteoarthritis     Osteoporosis     Rheumatoid arthritis(714.0)     Umbilical hernia 8/13/2015     Past Surgical History:   Procedure Laterality Date    BREAST BIOPSY Left 01/29/2002    core bx    CARPAL TUNNEL RELEASE Right 05/2017    CHOLECYSTECTOMY  2004    COLONOSCOPY      10/11    COLONOSCOPY N/A 6/29/2017    Procedure: COLONOSCOPY;  Surgeon: López Moore MD;  Location: [facility] ENDO (4TH FLR);  Service: Endoscopy;  Laterality: N/A;    INTRAMEDULLARY RODDING OF FEMUR Left 5/21/2019    Procedure: INSERTION, INTRAMEDULLARY ARIEL, FEMUR;  Surgeon: López Duff MD;  Location: [facility] OR 2ND FLR;  Service: Orthopedics;  Laterality: Left;    REPAIR OF TRICEPS TENDON Left 10/17/2018    Procedure: REPAIR, TENDON, TRICEPS left elbow;  Surgeon: Staci Yarbrough MD;  Location: [facility] OR 1ST FLR;  Service: Orthopedics;  Laterality: Left;  Anesthesia: General and regional. PRONE, k-wire , hand pan 1 and pan 2, CALL ARTHREX/Tamera notified 10-12     TONSILLECTOMY      TUBAL LIGATION  2003    UPPER GASTROINTESTINAL ENDOSCOPY      10/11    uterine ablation  2003     Review of patient's allergies indicates:   Allergen Reactions    Actemra [tocilizumab] Other (See Comments)     Severe dizziness    Codeine Nausea And Vomiting    Gold au 198 Hives and Rash    Hydroxychloroquine Other (See Comments)     Can't remember the reaction      Iodinated contrast media Other (See Comments)     Other  "reaction(s): BURNING ALL OVER    Iodine Other (See Comments)     Other reaction(s): BURNING ALL OVER - Iodine dye - Not topical    Sulfa (sulfonamide antibiotics) Other (See Comments)     Can't remember the reaction    Zofran [ondansetron hcl (pf)] Nausea And Vomiting     Pt reports that last time she received zofran she started vomiting again    Methotrexate analogues Nausea Only    Pneumovax 23 [pneumococcal 23-argenis ps vaccine] Other (See Comments)     "sick"      Current Facility-Administered Medications   Medication    0.9%  NaCl infusion     Facility-Administered Medications Ordered in Other Encounters   Medication    0.9%  NaCl infusion       PMHx, PSHx, Allergies, Medications reviewed in epic    ROS negative except pain complaints in HPI    OBJECTIVE:    BP (!) 153/81 (BP Location: Right arm, Patient Position: Lying)   Pulse 83   Temp 97.8 °F (36.6 °C) (Oral)   Resp 16   LMP  (LMP Unknown)   SpO2 97%   Breastfeeding? No     PHYSICAL EXAMINATION:    GENERAL: Well appearing, in no acute distress, alert and oriented x3.  PSYCH:  Mood and affect appropriate.  SKIN: Skin color, texture, turgor normal, no rashes or lesions which will impact the procedure.  CV: RRR with palpation of the radial artery.  PULM: No evidence of respiratory difficulty, symmetric chest rise. Clear to auscultation.  NEURO: Cranial nerves grossly intact.    Plan:    Proceed with procedure as planned Procedure(s) (LRB):  FACET JOINT INJECTION (LUMBAR BLOCK) BILATERAL L4-5 AND L5-S1 DIRECT REFERRAL (Bilateral)    Eyal Flower  03/12/2020            "

## 2020-03-12 NOTE — DISCHARGE INSTRUCTIONS

## 2020-03-12 NOTE — DISCHARGE SUMMARY
Discharge Note  Short Stay      SUMMARY     Admit Date: 3/12/2020    Attending Physician: Tj Mayfield      Discharge Physician: Tj Mayfield      Discharge Date: 3/12/2020 9:32 AM    Procedure(s) (LRB):  FACET JOINT INJECTION (LUMBAR BLOCK) BILATERAL L4-5 AND L5-S1 DIRECT REFERRAL (Bilateral)    Final Diagnosis: Spondylosis of lumbar region without myelopathy or radiculopathy [M47.816]    Disposition: Home or self care    Patient Instructions:   Discharge Medication List as of 3/12/2020  8:53 AM      CONTINUE these medications which have NOT CHANGED    Details   albuterol (ACCUNEB) 1.25 mg/3 mL Nebu Take 3 mLs (1.25 mg total) by nebulization every 6 (six) hours as needed. Rescue, Starting Mon 7/22/2019, Until Tue 7/21/2020, Normal      albuterol (PROVENTIL HFA) 90 mcg/actuation inhaler Inhale 2 puffs into the lungs every 6 (six) hours as needed. Rescue, Starting Mon 11/4/2019, Normal      aspirin 81 mg Tab Take 81 mg by mouth every morning. , Starting 4/12/2012, Until Discontinued, Historical Med      azelastine (ASTELIN) 137 mcg nasal spray 1 spray (137 mcg total) by Nasal route 2 (two) times daily., Starting 7/28/2015, Until Discontinued, Normal      benzonatate (TESSALON) 100 MG capsule Take 1 capsule by mouth three times a day, Starting Thu 9/19/2019, Normal      budesonide-formoterol 160-4.5 mcg (SYMBICORT) 160-4.5 mcg/actuation HFAA Inhale 2 puffs into the lungs every 12 (twelve) hours., Starting Tue 10/8/2019, Normal      calcium citrate-vitamin D (CITRACAL + D) 315-200 mg-unit per tablet Take 1 tablet by mouth 2 (two) times daily. , Starting 4/12/2012, Until Discontinued, Historical Med      diazePAM (VALIUM) 5 MG tablet Take 1 tablet (5 mg total) by mouth every 6 (six) hours as needed for Anxiety., Starting Tue 3/3/2020, Until Thu 4/2/2020, Normal      diclofenac sodium (VOLTAREN) 1 % Gel APPLY 2 G TOPICALLY 4 (FOUR) TIMES DAILY AS NEEDED., Starting Mon 2/3/2020, Normal      doxycycline (MONODOX) 50 MG Cap  Take 1 capsule (50 mg total) by mouth 2 (two) times daily., Starting Fri 11/29/2019, Normal      fluconazole (DIFLUCAN) 100 MG tablet Starting Thu 1/2/2020, Historical Med      fluticasone propionate (FLONASE) 50 mcg/actuation nasal spray 2 sprays (100 mcg total) by Each Nostril route once daily., Starting Mon 2/17/2020, Normal      GUAIFENESIN (MUCINEX ORAL) Take 400 mg by mouth every 4 (four) hours as needed. , Until Discontinued, Historical Med      INV PROPULSID 10 MG Take 1 tablets (20 mg) by mouth 4 (four) times daily. FOR INVESTIGATIONAL USE ONLY. Protocol CIS-USA-154, Starting Mon 10/7/2019, Normal      leflunomide (ARAVA) 20 MG Tab Take 1 tablet (20 mg total) by mouth once daily., Starting Mon 1/13/2020, Normal      lifitegrast (XIIDRA) 5 % Dpet Place 1 drop into both eyes 2 (two) times daily., Starting Fri 11/29/2019, Until Sat 11/28/2020, Normal      methylPREDNISolone (MEDROL DOSEPACK) 4 mg tablet use as directed, Normal      montelukast (SINGULAIR) 10 mg tablet Take 1 tablet (10 mg total) by mouth nightly., Starting Mon 9/30/2019, Normal      naproxen (NAPROSYN) 500 MG tablet TAKE 1 TABLET TWICE A DAY AS NEEDED, Normal      nitrofurantoin, macrocrystal-monohydrate, (MACROBID) 100 MG capsule Starting Sat 2/1/2020, Historical Med      omeprazole (PRILOSEC) 40 MG capsule Take 1 capsule (40 mg total) by mouth every morning., Starting Wed 6/5/2019, Normal      omeprazole-sodium bicarbonate (ZEGERID) 40-1.1 mg-gram per capsule TAKE 1 CAPSULE BY MOUTH EVERY MORNING., Starting Sun 9/8/2019, Normal      POTASSIUM ORAL Take by mouth once daily., Until Discontinued, Historical Med      predniSONE (DELTASONE) 1 MG tablet Starting Sun 3/8/2020, Historical Med      PREMARIN 0.625 mg tablet Take 0.625 mg by mouth once daily., Starting Mon 7/16/2018, Historical Med      PREMARIN vaginal cream PLACE 0.5 GRAM VAGINALLY 3 (THREE) TIMES A WEEK., Normal      progesterone (PROMETRIUM) 100 MG capsule Take 100 mg by mouth  "every morning. 1 Capsule Oral Every day, morning, Until Discontinued, Historical Med      RESTASIS 0.05 % ophthalmic emulsion PLACE 0.4 MLS (1 DROP TOTAL) INTO BOTH EYES 2 (TWO) TIMES DAILY., Historical Med      rizatriptan (MAXALT) 10 MG tablet Take 10 mg by mouth as needed for Migraine. , Starting 4/12/2012, Until Discontinued, Historical Med      rosuvastatin (CRESTOR) 20 MG tablet Take 1 tablet (20 mg total) by mouth every evening., Starting Sat 4/13/2019, Normal      sarilumab (KEVZARA) 200 mg/1.14 mL Syrg Inject 1.14 mLs (200 mg total) into the skin every 14 (fourteen) days., Starting Mon 1/13/2020, Normal      sucralfate (CARAFATE) 1 gram tablet TAKE 1 TABLET (1 G TOTAL) BY MOUTH 4 (FOUR) TIMES DAILY., Starting Mon 9/30/2019, Normal      syringe with needle (TUBERCULIN SYRINGE) 1 mL 27 x 1/2" Syrg To administer methotrexate 7.5mg sc once a week, Normal      teriparatide (FORTEO) 20 mcg/dose - 600 mcg/2.4 mL PnIj Inject 0.08 mLs (20 mcg total) into the skin once daily., Starting Wed 12/18/2019, Until Thu 12/17/2020, Normal      tiZANidine (ZANAFLEX) 2 MG tablet Take 0.5-1 tablets (1-2 mg total) by mouth every 6 (six) hours as needed., Starting Fri 2/28/2020, Normal      traMADol (ULTRAM) 50 mg tablet Take 1 tablet (50 mg total) by mouth every 24 hours as needed for Pain., Starting Tue 10/8/2019, Normal      valACYclovir (VALTREX) 1000 MG tablet TAKE 1 TABLET BY MOUTH TWICE A DAY AS NEEDED, Normal                 Discharge Diagnosis: Spondylosis of lumbar region without myelopathy or radiculopathy [M47.816]  Condition on Discharge: Stable with no complications to procedure   Diet on Discharge: Same as before.  Activity: as per instruction sheet.  Discharge to: Home with a responsible adult.  Follow up: 2-4 weeks       Please call my office or pager at [phone] if experienced any weakness or loss of sensation, fever > 101.5, pain uncontrolled with oral medications, persistent nausea/vomiting/or diarrhea, " redness or drainage from the incisions, or any other worrisome concerns. If physician on call was not reached or could not communicate with our office for any reason please go to the nearest emergency department     Patient was monitored and observed in the recovery room, and was discharged home in stable condition.

## 2020-03-12 NOTE — DISCHARGE SUMMARY
Discharge Note  Short Stay      SUMMARY     Admit Date: 3/12/2020    Attending Physician: Tj Mayfield      Discharge Physician: Tj Mayfield      Discharge Date: 3/12/2020 8:41 AM    Procedure(s) (LRB):  FACET JOINT INJECTION (LUMBAR BLOCK) BILATERAL L4-5 AND L5-S1 DIRECT REFERRAL (Bilateral)    Final Diagnosis: Spondylosis of lumbar region without myelopathy or radiculopathy [M47.816]    Disposition: Home or self care    Patient Instructions:   Current Discharge Medication List      CONTINUE these medications which have NOT CHANGED    Details   albuterol (ACCUNEB) 1.25 mg/3 mL Nebu Take 3 mLs (1.25 mg total) by nebulization every 6 (six) hours as needed. Rescue  Qty: 1 Box, Refills: 2      INV PROPULSID 10 MG Take 1 tablets (20 mg) by mouth 4 (four) times daily. FOR INVESTIGATIONAL USE ONLY. Protocol CIS-USA-154  Qty: 1560 each, Refills: 0    Associated Diagnoses: Patient in clinical research study      montelukast (SINGULAIR) 10 mg tablet Take 1 tablet (10 mg total) by mouth nightly.  Qty: 90 tablet, Refills: 3    Associated Diagnoses: Perennial allergic rhinitis      omeprazole-sodium bicarbonate (ZEGERID) 40-1.1 mg-gram per capsule TAKE 1 CAPSULE BY MOUTH EVERY MORNING.  Qty: 90 capsule, Refills: 3      predniSONE (DELTASONE) 1 MG tablet       RESTASIS 0.05 % ophthalmic emulsion PLACE 0.4 MLS (1 DROP TOTAL) INTO BOTH EYES 2 (TWO) TIMES DAILY.  Refills: 5      albuterol (PROVENTIL HFA) 90 mcg/actuation inhaler Inhale 2 puffs into the lungs every 6 (six) hours as needed. Rescue  Qty: 20.1 Inhaler, Refills: 11      aspirin 81 mg Tab Take 81 mg by mouth every morning.       azelastine (ASTELIN) 137 mcg nasal spray 1 spray (137 mcg total) by Nasal route 2 (two) times daily.  Qty: 30 mL, Refills: 11    Associated Diagnoses: Nasal congestion      benzonatate (TESSALON) 100 MG capsule Take 1 capsule by mouth three times a day  Qty: 9 capsule, Refills: 0    Comments: Rx Discount Card: [facility] CARD- GROUP: [id number], BIN:  [id number], ID: [id number] RxVoucher#[id number]      budesonide-formoterol 160-4.5 mcg (SYMBICORT) 160-4.5 mcg/actuation HFAA Inhale 2 puffs into the lungs every 12 (twelve) hours.  Qty: 3 Inhaler, Refills: 3    Associated Diagnoses: Chronic asthma, mild intermittent, uncomplicated      calcium citrate-vitamin D (CITRACAL + D) 315-200 mg-unit per tablet Take 1 tablet by mouth 2 (two) times daily.       diazePAM (VALIUM) 5 MG tablet Take 1 tablet (5 mg total) by mouth every 6 (six) hours as needed for Anxiety.  Qty: 2 tablet, Refills: 0      diclofenac sodium (VOLTAREN) 1 % Gel APPLY 2 G TOPICALLY 4 (FOUR) TIMES DAILY AS NEEDED.  Qty: 500 g, Refills: 5    Associated Diagnoses: Rheumatoid arthritis involving multiple sites, unspecified rheumatoid factor presence; Bunion; Tailor's bunion of both feet      doxycycline (MONODOX) 50 MG Cap Take 1 capsule (50 mg total) by mouth 2 (two) times daily.  Qty: 60 capsule, Refills: 6      fluconazole (DIFLUCAN) 100 MG tablet       fluticasone propionate (FLONASE) 50 mcg/actuation nasal spray 2 sprays (100 mcg total) by Each Nostril route once daily.  Qty: 9.9 mL, Refills: 11      GUAIFENESIN (MUCINEX ORAL) Take 400 mg by mouth every 4 (four) hours as needed.       leflunomide (ARAVA) 20 MG Tab Take 1 tablet (20 mg total) by mouth once daily.  Qty: 90 tablet, Refills: 0    Associated Diagnoses: Rheumatoid arthritis, involving unspecified site, unspecified rheumatoid factor presence      lifitegrast (XIIDRA) 5 % Dpet Place 1 drop into both eyes 2 (two) times daily.  Qty: 60 each, Refills: 12      methylPREDNISolone (MEDROL DOSEPACK) 4 mg tablet use as directed  Qty: 1 Package, Refills: 0      naproxen (NAPROSYN) 500 MG tablet TAKE 1 TABLET TWICE A DAY AS NEEDED  Qty: 180 tablet, Refills: 0      nitrofurantoin, macrocrystal-monohydrate, (MACROBID) 100 MG capsule       omeprazole (PRILOSEC) 40 MG capsule Take 1 capsule (40 mg total) by mouth every morning.  Qty: 30 capsule, Refills: 6     "  POTASSIUM ORAL Take by mouth once daily.      PREMARIN 0.625 mg tablet Take 0.625 mg by mouth once daily.  Refills: 4      PREMARIN vaginal cream PLACE 0.5 GRAM VAGINALLY 3 (THREE) TIMES A WEEK.  Qty: 30 g, Refills: 3    Associated Diagnoses: Vaginal atrophy; Recurrent UTI      progesterone (PROMETRIUM) 100 MG capsule Take 100 mg by mouth every morning. 1 Capsule Oral Every day, morning      rizatriptan (MAXALT) 10 MG tablet Take 10 mg by mouth as needed for Migraine.       rosuvastatin (CRESTOR) 20 MG tablet Take 1 tablet (20 mg total) by mouth every evening.  Qty: 90 tablet, Refills: 3    Associated Diagnoses: Coronary artery disease involving native coronary artery of native heart without angina pectoris      sarilumab (KEVZARA) 200 mg/1.14 mL Syrg Inject 1.14 mLs (200 mg total) into the skin every 14 (fourteen) days.  Qty: 2.28 mL, Refills: 2      sucralfate (CARAFATE) 1 gram tablet TAKE 1 TABLET (1 G TOTAL) BY MOUTH 4 (FOUR) TIMES DAILY.  Qty: 360 tablet, Refills: 3    Associated Diagnoses: Gastroparesis      syringe with needle (TUBERCULIN SYRINGE) 1 mL 27 x 1/2" Syrg To administer methotrexate 7.5mg sc once a week  Qty: 12 Syringe, Refills: 3    Associated Diagnoses: Rheumatoid arthritis involving multiple sites, unspecified rheumatoid factor presence      teriparatide (FORTEO) 20 mcg/dose - 600 mcg/2.4 mL PnIj Inject 0.08 mLs (20 mcg total) into the skin once daily.  Qty: 2.4 mL, Refills: 11    Associated Diagnoses: Post-menopausal osteoporosis; Steroid-induced osteoporosis; Stress fracture of neck of left femur      tiZANidine (ZANAFLEX) 2 MG tablet Take 0.5-1 tablets (1-2 mg total) by mouth every 6 (six) hours as needed.  Qty: 90 tablet, Refills: 2      traMADol (ULTRAM) 50 mg tablet Take 1 tablet (50 mg total) by mouth every 24 hours as needed for Pain.  Qty: 7 tablet, Refills: 0    Comments: Quantity prescribed more than 7 day supply? No      valACYclovir (VALTREX) 1000 MG tablet TAKE 1 TABLET BY MOUTH " TWICE A DAY AS NEEDED  Qty: 20 tablet, Refills: 3    Associated Diagnoses: Recurrent oral herpes simplex                 Discharge Diagnosis: Spondylosis of lumbar region without myelopathy or radiculopathy [M47.816]  Condition on Discharge: Stable with no complications to procedure   Diet on Discharge: Same as before.  Activity: as per instruction sheet.  Discharge to: Home with a responsible adult.  Follow up: 2-4 weeks       Please call my office or pager at [phone] if experienced any weakness or loss of sensation, fever > 101.5, pain uncontrolled with oral medications, persistent nausea/vomiting/or diarrhea, redness or drainage from the incisions, or any other worrisome concerns. If physician on call was not reached or could not communicate with our office for any reason please go to the nearest emergency department

## 2020-03-12 NOTE — OP NOTE
Thoracic/Lumbar Facet Joint Injection Under Fluoroscopy  Time-out taken to identify patient and procedure side prior to starting the procedure.            I attest that I have reviewed the patient's home medications prior to the procedure and no contraindication have been identified. I  re-evaluated the patient after the patient was positioned for the procedure in the procedure room immediately before the procedural time-out. The vital signs are current and represent the current state of the patient which has not significantly changed since the preprocedure assessment.   Date of Service: 03/12/2020    PCP: Becca Peters DO    Referring Physician:                                                        PROCEDURE:  bilateral facet joint injection at L4/5, L5/S1 under fluoroscopy.    REASON FOR PROCEDURE: Spondylosis of lumbar region without myelopathy or radiculopathy [M47.816]  1. Lumbar spondylosis    2. Chronic pain      POSTOP DIAGNOSIS: Spondylosis of lumbar region without myelopathy or radiculopathy [M47.816]  1. Lumbar spondylosis    2. Chronic pain      PHYSICIAN: Tj Mayfield MD  ASSISTANTS:     MEDICATIONS INJECTED:  0.5ml Kenalog 40mg and Bupivacaine 0.25% 10ml. Injected 1.5ml  per level.  LOCAL ANESTHETIC USED:   Xylocaine 1% 9ml with Sodium Bicarbonate 1ml. 3ml per site.  SEDATION MEDICATIONS: local/IV sedation:  ESTIMATED BLOOD LOSS:  None.    COMPLICATIONS:  None.    TECHNIQUE:   Lying in a prone position, the patient was prepped and draped in the usual sterile fashion using ChloraPrep and fenestrated drape.  The level was determined under fluoroscopic guidance.  Local anesthetic was given by going down to the hub of the 27-gauge 1.25in needle and raising a wheel.  The 3.5in 22-gauge needle was introduced into all levels as stated above facet joints.  Negative pressure applied to make sure that there was no intravascular placement.  Omnipaque was injected to confirm placement.  It was also done to  confirm that there was no vascular runoff.  Medication was then injected slowly.  The patient tolerated the procedure well.     PAIN BEFORE THE PROCEDURE:  8/10.    PAIN AFTER THE PROCEDURE: 0/10.    The patient was monitored after the procedure.  Patient was given post procedure and discharge instructions to follow at home.  We will see the patient back in two weeks or the patient may call to inform of status. The patient was discharged in a stable condition

## 2020-03-16 ENCOUNTER — PATIENT MESSAGE (OUTPATIENT)
Dept: RHEUMATOLOGY | Facility: CLINIC | Age: 60
End: 2020-03-16

## 2020-03-16 ENCOUNTER — TELEPHONE (OUTPATIENT)
Dept: RHEUMATOLOGY | Facility: CLINIC | Age: 60
End: 2020-03-16

## 2020-03-16 RX ORDER — NAPROXEN 500 MG/1
500 TABLET ORAL 2 TIMES DAILY
Qty: 180 TABLET | Refills: 0 | Status: SHIPPED | OUTPATIENT
Start: 2020-03-16 | End: 2020-06-11 | Stop reason: SDUPTHER

## 2020-03-17 RX ORDER — FLUCONAZOLE 100 MG/1
100 TABLET ORAL ONCE
Qty: 14 TABLET | Refills: 0 | Status: SHIPPED | OUTPATIENT
Start: 2020-03-17 | End: 2020-04-20

## 2020-03-23 ENCOUNTER — TELEPHONE (OUTPATIENT)
Dept: GASTROENTEROLOGY | Facility: CLINIC | Age: 60
End: 2020-03-23

## 2020-03-23 NOTE — TELEPHONE ENCOUNTER
Spoke with patient.  Appointment with Dr.James Moore on 4/1 for 10:30 has been converted to a virtual visit.  Confirmation mailed to patient.

## 2020-03-23 NOTE — TELEPHONE ENCOUNTER
----- Message from Maryellen Hilario sent at 3/23/2020  8:46 AM CDT -----  Contact: pt at [phone]  Oscar avila-Maryellen-Just missed your call.

## 2020-03-25 ENCOUNTER — TELEPHONE (OUTPATIENT)
Dept: PODIATRY | Facility: CLINIC | Age: 60
End: 2020-03-25

## 2020-03-25 NOTE — TELEPHONE ENCOUNTER
Spoke with patient to offer her a virtual appointment, Patient stated that she is doing fine and will call to schedule when needed.

## 2020-03-26 ENCOUNTER — PATIENT OUTREACH (OUTPATIENT)
Dept: ADMINISTRATIVE | Facility: OTHER | Age: 60
End: 2020-03-26

## 2020-03-27 ENCOUNTER — PATIENT MESSAGE (OUTPATIENT)
Dept: GASTROENTEROLOGY | Facility: CLINIC | Age: 60
End: 2020-03-27

## 2020-03-27 NOTE — TELEPHONE ENCOUNTER
Confirmed virtual visit appointment with patient. Instructed patient how to do virtual visit on her cell phone. Verbalized understanding.

## 2020-04-01 ENCOUNTER — TELEPHONE (OUTPATIENT)
Dept: GASTROENTEROLOGY | Facility: CLINIC | Age: 60
End: 2020-04-01

## 2020-04-01 ENCOUNTER — OFFICE VISIT (OUTPATIENT)
Dept: GASTROENTEROLOGY | Facility: CLINIC | Age: 60
End: 2020-04-01
Payer: MEDICARE

## 2020-04-01 ENCOUNTER — PATIENT OUTREACH (OUTPATIENT)
Dept: ADMINISTRATIVE | Facility: OTHER | Age: 60
End: 2020-04-01

## 2020-04-01 ENCOUNTER — TELEPHONE (OUTPATIENT)
Dept: INFECTIOUS DISEASES | Facility: HOSPITAL | Age: 60
End: 2020-04-01

## 2020-04-01 DIAGNOSIS — K31.84 GASTROPARESIS: Primary | ICD-10-CM

## 2020-04-01 DIAGNOSIS — Z00.6 PATIENT IN CLINICAL RESEARCH STUDY: ICD-10-CM

## 2020-04-01 DIAGNOSIS — Z00.6 RESEARCH STUDY PATIENT: ICD-10-CM

## 2020-04-01 DIAGNOSIS — K21.9 GASTROESOPHAGEAL REFLUX DISEASE WITHOUT ESOPHAGITIS: ICD-10-CM

## 2020-04-01 PROCEDURE — 99214 OFFICE O/P EST MOD 30 MIN: CPT | Mod: 95,,, | Performed by: INTERNAL MEDICINE

## 2020-04-01 PROCEDURE — 99214 PR OFFICE/OUTPT VISIT, EST, LEVL IV, 30-39 MIN: ICD-10-PCS | Mod: 95,,, | Performed by: INTERNAL MEDICINE

## 2020-04-01 NOTE — PROGRESS NOTES
The patient location is: home  The chief complaint leading to consultation is: follow up  Visit type: Virtual visit with synchronous audio and video  Total time spent with patient: 12 min  Each patient to whom he or she provides medical services by telemedicine is:  (1) informed of the relationship between the physician and patient and the respective role of any other health care provider with respect to management of the patient; and (2) notified that he or she may decline to receive medical services by telemedicine and may withdraw from such care at any time.    Notes:  This is a scheduled follow-up visit for Joyce for her participation in the compassionate trial for cisapride.  She is on cisapride for gastroparesis.    She did have 1 episode 2 weeks ago with the sudden onset of nausea vomiting and diarrhea.  This took place throughout the night.  Had not been for the current situation she would have gone to the hospital to get IV fluids.  However she was able to do her best keeping down fluids and this finally resolved in about 12 hr.  This was likely a viral gastroenteritis.  She was weak for about 2 days after that.    Since that time she has been doing well.  She eats small meals in general.  Nausea is well controlled.  No vomiting.  Her reflux symptoms are controlled.  Her bowel movements are normal.  In the past she has struggled with constipation but she has a new medicine now for her rheumatoid and her bowel movements are now improved.    She is on 2 new medicines.  She is started on Forteo for her osteoporosis.  And she is on a new type of eye drop.    On review of systems there are no new symptoms.  And specifically she denies any palpitations syncope chest pain.    Assessment  1.  Gastroparesis.  She had has obtain good relief with cisapride.  She wants to continue on cisapride.  I will fill out the study forms.  2.  Gastroesophageal reflux this relates to the gastroparesis in is well controlled  3.   Research study participant.  Normally for this semi annual visit there would be labs and EKG.  I had a phone conference with the  Dr. Brandt in [place].  I was able to get permission to forego the EKG and labs for this visit considering the COVID-19 situation.  He approved continue a shin and she will receive the study drug.  Ideally I would like to get that follow-up EKG this summer.  But I can not wait till the next visit in 6 months.    Total visit time was 25 min and that includes phone conference, forms, conference with research coordinators, and face-to-face time with the patient   Answers for HPI/ROS submitted by the patient on 3/29/2020   fever: No  chills: No  weight loss: No  eye pain: No  eye redness: No  trouble swallowing: No  chest pain: No  shortness of breath: No  cough: No  rash: No  abdominal pain: No  nausea: No  vomiting: No  Feeling depressed?: No  nervous/ anxious: No  heartburn: Yes  Joint pain? : Yes  dysuria: No  hematuria: No  back pain: No

## 2020-04-04 ENCOUNTER — PATIENT MESSAGE (OUTPATIENT)
Dept: GASTROENTEROLOGY | Facility: CLINIC | Age: 60
End: 2020-04-04

## 2020-04-06 ENCOUNTER — TELEPHONE (OUTPATIENT)
Dept: PHARMACY | Facility: CLINIC | Age: 60
End: 2020-04-06

## 2020-04-08 ENCOUNTER — TELEPHONE (OUTPATIENT)
Dept: SPINE | Facility: CLINIC | Age: 60
End: 2020-04-08

## 2020-04-08 NOTE — TELEPHONE ENCOUNTER
Called to check in.  The facet injections on 3/12 were very helpful with almost complete relief.  She is starting to have some of the pain come back.  We did discuss that RFA might be an option.  She is going to continue to stretch and stay active.  She is wearing ankle brace for her ankle and going to get custom inserts when she can.  We discussed a virtual visit is an option when she needs it

## 2020-04-09 RX ORDER — PREDNISONE 1 MG/1
TABLET ORAL
Qty: 30 TABLET | Refills: 1 | Status: SHIPPED | OUTPATIENT
Start: 2020-04-09 | End: 2020-07-06

## 2020-04-13 NOTE — TELEPHONE ENCOUNTER
Patient contacted [facility] for refills of Kevzara and Forteo. Patient has 0 doses on hand each both medications. He next Kevzara injection is due next Monday, 4/20 and Forteo due on Wednesday, 4/15. Denies missed doses. Confirmed dose/directions. Will ship medications on 4/13 for the patient to receive on 4/14. $65 copay. [facility]. Address confirmed. Patient is not in need of a sharps container at this time. No changes in medications, allergies, or health conditions. Patient has no questions or concerns at this time. She is aware to contact [facility] if she needs anything.

## 2020-04-14 ENCOUNTER — TELEPHONE (OUTPATIENT)
Dept: HEMATOLOGY/ONCOLOGY | Facility: CLINIC | Age: 60
End: 2020-04-14

## 2020-04-20 RX ORDER — FLUCONAZOLE 100 MG/1
100 TABLET ORAL ONCE
Qty: 14 TABLET | Refills: 0 | Status: SHIPPED | OUTPATIENT
Start: 2020-04-20 | End: 2020-06-02

## 2020-04-29 ENCOUNTER — TELEPHONE (OUTPATIENT)
Dept: RHEUMATOLOGY | Facility: CLINIC | Age: 60
End: 2020-04-29

## 2020-04-29 ENCOUNTER — PATIENT MESSAGE (OUTPATIENT)
Dept: RHEUMATOLOGY | Facility: CLINIC | Age: 60
End: 2020-04-29

## 2020-04-30 ENCOUNTER — INFUSION (OUTPATIENT)
Dept: INFECTIOUS DISEASES | Facility: HOSPITAL | Age: 60
End: 2020-04-30
Attending: INTERNAL MEDICINE
Payer: MEDICARE

## 2020-04-30 ENCOUNTER — PATIENT MESSAGE (OUTPATIENT)
Dept: RHEUMATOLOGY | Facility: CLINIC | Age: 60
End: 2020-04-30

## 2020-04-30 VITALS
HEART RATE: 96 BPM | HEIGHT: 63 IN | TEMPERATURE: 98 F | DIASTOLIC BLOOD PRESSURE: 84 MMHG | WEIGHT: 160.94 LBS | SYSTOLIC BLOOD PRESSURE: 129 MMHG | BODY MASS INDEX: 28.52 KG/M2

## 2020-04-30 DIAGNOSIS — T38.0X5A STEROID-INDUCED OSTEOPOROSIS: Primary | ICD-10-CM

## 2020-04-30 DIAGNOSIS — M81.8 STEROID-INDUCED OSTEOPOROSIS: Primary | ICD-10-CM

## 2020-04-30 PROCEDURE — 63600175 PHARM REV CODE 636 W HCPCS: Mod: JG | Performed by: INTERNAL MEDICINE

## 2020-04-30 PROCEDURE — 96372 THER/PROPH/DIAG INJ SC/IM: CPT

## 2020-04-30 RX ADMIN — DENOSUMAB 60 MG: 60 INJECTION SUBCUTANEOUS at 10:04

## 2020-04-30 NOTE — PROGRESS NOTES
Patient received Prolia 60 mg injection sub Q in the left arm.  Tolerated well and left in NAD.    Patient is taking calcium and vit d

## 2020-05-02 ENCOUNTER — PATIENT MESSAGE (OUTPATIENT)
Dept: OPTOMETRY | Facility: CLINIC | Age: 60
End: 2020-05-02

## 2020-05-04 ENCOUNTER — TELEPHONE (OUTPATIENT)
Dept: PHARMACY | Facility: CLINIC | Age: 60
End: 2020-05-04

## 2020-05-04 DIAGNOSIS — H04.123 BILATERAL DRY EYES: Primary | ICD-10-CM

## 2020-05-04 RX ORDER — CYCLOSPORINE 0.5 MG/ML
EMULSION OPHTHALMIC
Qty: 60 VIAL | Refills: 5 | Status: SHIPPED | OUTPATIENT
Start: 2020-05-04 | End: 2020-11-01

## 2020-05-07 ENCOUNTER — TELEPHONE (OUTPATIENT)
Dept: PHARMACY | Facility: CLINIC | Age: 60
End: 2020-05-07

## 2020-05-08 NOTE — TELEPHONE ENCOUNTER
Refill call regarding Dom & Forteo from [facility]...Patient reached and informed of copay of $65.00 [facility] @004. Patients next dose is scheduled for 5/14 shipping out 5/11 for 5/12 arrival with patients consent, sharps, pen needles, and alcohol pads added to this fill.  [place]

## 2020-05-11 ENCOUNTER — TELEPHONE (OUTPATIENT)
Dept: RHEUMATOLOGY | Facility: CLINIC | Age: 60
End: 2020-05-11

## 2020-05-12 NOTE — PROGRESS NOTES
Established Patient - Audio Only Telehealth Visit     The patient location is: home  The chief complaint leading to consultation is: ROBERT  Visit type: Virtual visit with audio only (telephone)  Total time spent with patient: 16 minutes  Total time spent in preparation and with patient: 25 minutes     The reason for the audio only service rather than synchronous audio and video virtual visit was related to technical difficulties or patient preference/necessity.     Each patient to whom I provide medical services by telemedicine is:  (1) informed of the relationship between the physician and patient and the respective role of any other health care provider with respect to management of the patient; and (2) notified that they may decline to receive medical services by telemedicine and may withdraw from such care at any time. Patient verbally consented to receive this service via voice-only telephone call.    SECTION OF HEMATOLOGY AND BONE MARROW TRANSPLANT  Return  Patient Visit   05/12/2020  Referred by:  No ref. provider found  Referred for: ROBERT    CHIEF COMPLAINT:   No chief complaint on file.      HISTORY OF PRESENT ILLNESS:   59 y.o. female Referred to me November 2017  for ROBERT.  Intolerant to po iron.  Iron studies in march 2017 with severe ID.  History RA followed by Dr. Valverde.  History of gastroparesis followed by Dr. Moore in GI.  Denies fever, chills, nightsweats, bleeding, brusing, lymphadenopathy, signs/symptoms of splenomegaly.   Had upper and lower endoscopy summer 2017 unremarkable.  No VCE done.  Denies any overt GI bleeding or other bleeding.    S/p repeat IV feraheme x 2 January 2018,  may 2019 and oct/nov 2019.  Tolerated well.   Maintained ferritin and hgb since that time. Continues close fu with rheum for RA and osteoperosis.      Interval history: Ms. Peterson presents via audio visit for follow-up for malabsorption-associated ROBERT. Continues treatment for RA and osteoarthritis. Denies fatigue,  palpitations, chest pain, SOB, and bleeding. She has been social distancing due to COVID. She denies fevers, SOB, cough, loss of taste or smell, or other symptoms of COVID. States she is feeling really well today.    PAST MEDICAL HISTORY:   Past Medical History:   Diagnosis Date    Acid reflux     Allergy     Anemia     Asthma     Coronary artery disease     Degenerative disc disease     Dry eyes     Dry mouth     Gastroparesis     Hyperlipidemia     Lateral meniscus derangement 4/6/2016    Lobular carcinoma in situ     Osteoarthritis     Osteoporosis     Rheumatoid arthritis(714.0)     Umbilical hernia 8/13/2015       PAST SURGICAL HISTORY:   Past Surgical History:   Procedure Laterality Date    BREAST BIOPSY Left 01/29/2002    core bx    CARPAL TUNNEL RELEASE Right 05/2017    CHOLECYSTECTOMY  2004    COLONOSCOPY      10/11    COLONOSCOPY N/A 6/29/2017    Procedure: COLONOSCOPY;  Surgeon: López Moore MD;  Location: [facility] (4TH FLR);  Service: Endoscopy;  Laterality: N/A;    INJECTION OF FACET JOINT Bilateral 3/12/2020    Procedure: FACET JOINT INJECTION (LUMBAR BLOCK) BILATERAL L4-5 AND L5-S1 DIRECT REFERRAL;  Surgeon: Tj Mayfield MD;  Location: [facility] PAIN MGT;  Service: Pain Management;  Laterality: Bilateral;  NEEDS CONSENT    INTRAMEDULLARY RODDING OF FEMUR Left 5/21/2019    Procedure: INSERTION, INTRAMEDULLARY ARIEL, FEMUR;  Surgeon: López Duff MD;  Location: [facility] OR 2ND FLR;  Service: Orthopedics;  Laterality: Left;    REPAIR OF TRICEPS TENDON Left 10/17/2018    Procedure: REPAIR, TENDON, TRICEPS left elbow;  Surgeon: Staci Yarbrough MD;  Location: [facility] OR 1ST FLR;  Service: Orthopedics;  Laterality: Left;  Anesthesia: General and regional. PRONE, k-wire , hand pan 1 and pan 2, CALL ARTHnavabi/Tamera notified 10-12 LO    TONSILLECTOMY      TUBAL LIGATION  2003    UPPER GASTROINTESTINAL ENDOSCOPY      10/11    uterine ablation  2003       PAST SOCIAL HISTORY:    reports that she has never smoked. She has never used smokeless tobacco. She reports that she drinks alcohol. She reports that she does not use drugs.    FAMILY HISTORY:  Family History   Problem Relation Age of Onset    Cancer Mother         Lung Cancer    Emphysema Mother     Heart attack Mother     Cancer Father         Lung Cancer    Osteoarthritis Father     Lung cancer Father     Skin cancer Father     Heart disease Brother     Heart attack Brother     Osteoarthritis Paternal Aunt     Retinal detachment Maternal Aunt     No Known Problems Sister     No Known Problems Maternal Uncle     No Known Problems Paternal Uncle     No Known Problems Maternal Grandmother     No Known Problems Maternal Grandfather     No Known Problems Paternal Grandmother     No Known Problems Paternal Grandfather     Colon cancer Neg Hx     Esophageal cancer Neg Hx     Stomach cancer Neg Hx     Celiac disease Neg Hx     Diabetes Neg Hx     Thyroid disease Neg Hx     Amblyopia Neg Hx     Blindness Neg Hx     Cataracts Neg Hx     Glaucoma Neg Hx     Hypertension Neg Hx     Macular degeneration Neg Hx     Strabismus Neg Hx     Stroke Neg Hx        CURRENT MEDICATIONS:   Current Outpatient Medications   Medication Sig    albuterol (ACCUNEB) 1.25 mg/3 mL Nebu Take 3 mLs (1.25 mg total) by nebulization every 6 (six) hours as needed. Rescue    albuterol (PROVENTIL HFA) 90 mcg/actuation inhaler Inhale 2 puffs into the lungs every 6 (six) hours as needed. Rescue    aspirin 81 mg Tab Take 81 mg by mouth every morning.     azelastine (ASTELIN) 137 mcg nasal spray 1 spray (137 mcg total) by Nasal route 2 (two) times daily.    benzonatate (TESSALON) 100 MG capsule Take 1 capsule by mouth three times a day    budesonide-formoterol 160-4.5 mcg (SYMBICORT) 160-4.5 mcg/actuation HFAA Inhale 2 puffs into the lungs every 12 (twelve) hours.    calcium citrate-vitamin D (CITRACAL + D) 315-200 mg-unit per tablet Take 1  tablet by mouth 2 (two) times daily.     diazePAM (VALIUM) 5 MG tablet Take 1 tablet (5 mg total) by mouth every 6 (six) hours as needed for Anxiety.    diclofenac sodium (VOLTAREN) 1 % Gel APPLY 2 G TOPICALLY 4 (FOUR) TIMES DAILY AS NEEDED.    doxycycline (MONODOX) 50 MG Cap Take 1 capsule (50 mg total) by mouth 2 (two) times daily. (Patient not taking: Reported on 3/9/2020)    fluticasone propionate (FLONASE) 50 mcg/actuation nasal spray 2 sprays (100 mcg total) by Each Nostril route once daily.    GUAIFENESIN (MUCINEX ORAL) Take 400 mg by mouth every 4 (four) hours as needed.     INV PROPULSID 10 MG Take 1 tablets (20 mg) by mouth 4 (four) times daily. FOR INVESTIGATIONAL USE ONLY. Protocol [id number]    leflunomide (ARAVA) 20 MG Tab Take 1 tablet (20 mg total) by mouth once daily.    lifitegrast (XIIDRA) 5 % Dpet Place 1 drop into both eyes 2 (two) times daily.    methylPREDNISolone (MEDROL DOSEPACK) 4 mg tablet use as directed    montelukast (SINGULAIR) 10 mg tablet Take 1 tablet (10 mg total) by mouth nightly.    naproxen (NAPROSYN) 500 MG tablet Take 1 tablet (500 mg total) by mouth 2 (two) times daily.    nitrofurantoin, macrocrystal-monohydrate, (MACROBID) 100 MG capsule     omeprazole (PRILOSEC) 40 MG capsule Take 1 capsule (40 mg total) by mouth every morning.    omeprazole-sodium bicarbonate (ZEGERID) 40-1.1 mg-gram per capsule TAKE 1 CAPSULE BY MOUTH EVERY MORNING.    POTASSIUM ORAL Take by mouth once daily.    predniSONE (DELTASONE) 1 MG tablet TAKE 1 TABLET BY MOUTH EVERY DAY    PREMARIN 0.625 mg tablet Take 0.625 mg by mouth once daily.    PREMARIN vaginal cream PLACE 0.5 GRAM VAGINALLY 3 (THREE) TIMES A WEEK.    progesterone (PROMETRIUM) 100 MG capsule Take 100 mg by mouth every morning. 1 Capsule Oral Every day, morning    RESTASIS 0.05 % ophthalmic emulsion PLACE 0.4 MLS (1 DROP TOTAL) INTO BOTH EYES 2 (TWO) TIMES DAILY.    rizatriptan (MAXALT) 10 MG tablet Take 10 mg by  "mouth as needed for Migraine.     rosuvastatin (CRESTOR) 20 MG tablet Take 1 tablet (20 mg total) by mouth every evening.    sarilumab (KEVZARA) 200 mg/1.14 mL Syrg Inject 1.14 mLs (200 mg total) into the skin every 14 (fourteen) days.    sucralfate (CARAFATE) 1 gram tablet TAKE 1 TABLET (1 G TOTAL) BY MOUTH 4 (FOUR) TIMES DAILY.    syringe with needle (TUBERCULIN SYRINGE) 1 mL 27 x 1/2" Syrg To administer methotrexate 7.5mg sc once a week    teriparatide (FORTEO) 20 mcg/dose - 600 mcg/2.4 mL PnIj Inject 0.08 mLs (20 mcg total) into the skin once daily.    tiZANidine (ZANAFLEX) 2 MG tablet Take 0.5-1 tablets (1-2 mg total) by mouth every 6 (six) hours as needed.    traMADol (ULTRAM) 50 mg tablet Take 1 tablet (50 mg total) by mouth every 24 hours as needed for Pain.    valACYclovir (VALTREX) 1000 MG tablet TAKE 1 TABLET BY MOUTH TWICE A DAY AS NEEDED     No current facility-administered medications for this visit.      Facility-Administered Medications Ordered in Other Visits   Medication    0.9%  NaCl infusion     ALLERGIES:   Review of patient's allergies indicates:   Allergen Reactions    Actemra [tocilizumab] Other (See Comments)     Severe dizziness    Codeine Nausea And Vomiting    Gold au 198 Hives and Rash    Hydroxychloroquine Other (See Comments)     Can't remember the reaction      Iodinated contrast- oral and iv dye Other (See Comments)     Other reaction(s): BURNING ALL OVER    Iodine Other (See Comments)     Other reaction(s): BURNING ALL OVER - Iodine dye - Not topical    Sulfa (sulfonamide antibiotics) Other (See Comments)     Can't remember the reaction    Zofran [ondansetron hcl (pf)] Nausea And Vomiting     Pt reports that last time she received zofran she started vomiting again    Methotrexate analogues Nausea Only    Pneumovax 23 [pneumococcal 23-argenis ps vaccine] Other (See Comments)     "sick"             REVIEW OF SYSTEMS:   General ROS: negative  Psychological ROS: " negative  Ophthalmic ROS: negative  ENT ROS: negative  Allergy and Immunology ROS: negative  Hematological and Lymphatic ROS: negative  Endocrine ROS: negative  Respiratory ROS: negative  Cardiovascular ROS: negative  Gastrointestinal ROS: see HPI  Genito-Urinary ROS: negative  Musculoskeletal ROS: see HPI  Neurological ROS: negative  Dermatological ROS: negative    Physical exam deferred due to nature of visit (audio only)    ECOG Performance Status: (foot note - ECOG PS provided by [employer]cology [employer]) 0 - Asymptomatic    Karnofsky Performance Score:  100%- Normal, No Complaints, No Evidence of Disease  DATA:   Lab Results   Component Value Date    WBC 4.36 04/30/2020    HGB 14.4 04/30/2020    HCT 45.1 04/30/2020    MCV 96 04/30/2020     04/30/2020     Gran # (ANC)   Date Value Ref Range Status   04/30/2020 2.9 1.8 - 7.7 K/uL Final     Gran%   Date Value Ref Range Status   04/30/2020 66.9 38.0 - 73.0 % Final     CMP  Sodium   Date Value Ref Range Status   04/30/2020 143 136 - 145 mmol/L Final     Potassium   Date Value Ref Range Status   04/30/2020 3.6 3.5 - 5.1 mmol/L Final     Chloride   Date Value Ref Range Status   04/30/2020 109 95 - 110 mmol/L Final     CO2   Date Value Ref Range Status   04/30/2020 24 23 - 29 mmol/L Final     Glucose   Date Value Ref Range Status   04/30/2020 102 70 - 110 mg/dL Final     BUN, Bld   Date Value Ref Range Status   04/30/2020 15 6 - 20 mg/dL Final     Creatinine   Date Value Ref Range Status   04/30/2020 1.0 0.5 - 1.4 mg/dL Final     Calcium   Date Value Ref Range Status   04/30/2020 9.1 8.7 - 10.5 mg/dL Final     Total Protein   Date Value Ref Range Status   04/30/2020 6.3 6.0 - 8.4 g/dL Final     Albumin   Date Value Ref Range Status   04/30/2020 4.0 3.5 - 5.2 g/dL Final     Total Bilirubin   Date Value Ref Range Status   04/30/2020 0.5 0.1 - 1.0 mg/dL Final     Comment:     For infants and newborns, interpretation of results should be based  on  gestational age, weight and in agreement with clinical  observations.  Premature Infant recommended reference ranges:  Up to 24 hours.............<8.0 mg/dL  Up to 48 hours............<12.0 mg/dL  3-5 days..................<15.0 mg/dL  6-29 days.................<15.0 mg/dL       Alkaline Phosphatase   Date Value Ref Range Status   04/30/2020 95 55 - 135 U/L Final     AST   Date Value Ref Range Status   04/30/2020 32 10 - 40 U/L Final     ALT   Date Value Ref Range Status   04/30/2020 41 10 - 44 U/L Final     Anion Gap   Date Value Ref Range Status   04/30/2020 10 8 - 16 mmol/L Final     eGFR if    Date Value Ref Range Status   04/30/2020 >60.0 >60 mL/min/1.73 m^2 Final     eGFR if non    Date Value Ref Range Status   04/30/2020 >60.0 >60 mL/min/1.73 m^2 Final     Comment:     Calculation used to obtain the estimated glomerular filtration  rate (eGFR) is the CKD-EPI equation.        Lab Results   Component Value Date    IRON 117 04/30/2020    TIBC 364 04/30/2020    FERRITIN 236 04/30/2020         ASSESSMENT AND PLAN:   Encounter Diagnoses   Name Primary?    Other iron deficiency anemia Yes    Gastroparesis     Rheumatoid arthritis involving multiple sites with positive rheumatoid factor         ROBERT  Microcytic anemia likely ROBERT  due to malabsorption related to GI issues; possible anemia of chronic inflammation from RA and meds contributing  Denies any over GI bleeding; Recent summer 2017 upper and lower GI endoscopy negative; VCE deferred but may need if ROBERT persists  Intolerant to po iron   S/p feraheme x 2 jan 2018, may 2019, and oct/nov 2019 with normalization of hgb and ferritin. Tolerated well.  Labs collected 4/30 and hgb 14.4; iron 117; ferritin 236  Fu with rheum for RA  Continue serial lab monitoring q 3 months (visit q 6 months) for further prn iron infusion needs   Patient understands that she should f/u sooner is symptomatic    Follow-up: CBC, iron, and ferritin in 3  months. Please link Dr. Valverde's labs (C-reactive protein, sed rate, and quanteferon gold) to this lab visit for patient's convenience. Same labs (including C-reactive protein, sed rate, and quanteferon gold) and appt with Dr. Woodson or NP in 6 months.      Bernadette Rust, CLIFP  Hematology/Oncology/Bone Marrow Transplant     This service was not originating from a related E/M service provided within the previous 7 days nor will  to an E/M service or procedure within the next 24 hours or my soonest available appointment.  Prevailing standard of care was able to be met in this audio-only visit.

## 2020-05-13 ENCOUNTER — OFFICE VISIT (OUTPATIENT)
Dept: HEMATOLOGY/ONCOLOGY | Facility: CLINIC | Age: 60
End: 2020-05-13
Payer: MEDICARE

## 2020-05-13 DIAGNOSIS — M05.79 RHEUMATOID ARTHRITIS INVOLVING MULTIPLE SITES WITH POSITIVE RHEUMATOID FACTOR: Chronic | ICD-10-CM

## 2020-05-13 DIAGNOSIS — K31.84 GASTROPARESIS: ICD-10-CM

## 2020-05-13 DIAGNOSIS — D50.8 OTHER IRON DEFICIENCY ANEMIA: Primary | ICD-10-CM

## 2020-05-13 PROCEDURE — 99443 PR PHYSICIAN TELEPHONE EVALUATION 21-30 MIN: ICD-10-PCS | Mod: 95,,, | Performed by: NURSE PRACTITIONER

## 2020-05-13 PROCEDURE — 99443 PR PHYSICIAN TELEPHONE EVALUATION 21-30 MIN: CPT | Mod: 95,,, | Performed by: NURSE PRACTITIONER

## 2020-05-13 NOTE — Clinical Note
CBC, iron, and ferritin in 3 months. Please link Dr. Valverde's labs (C-reactive protein, sed rate, and quanteferon gold) to this lab visit for patient's convenience. Same labs (including C-reactive protein, sed rate, and quanteferon gold) and appt with Dr. Woodson or NP in 6 months.

## 2020-05-17 DIAGNOSIS — N95.2 VAGINAL ATROPHY: ICD-10-CM

## 2020-05-17 DIAGNOSIS — N39.0 RECURRENT UTI: ICD-10-CM

## 2020-05-18 RX ORDER — CONJUGATED ESTROGENS 0.62 MG/G
CREAM VAGINAL
Qty: 30 G | Refills: 1 | Status: SHIPPED | OUTPATIENT
Start: 2020-05-18 | End: 2022-02-01

## 2020-05-22 ENCOUNTER — PATIENT OUTREACH (OUTPATIENT)
Dept: ADMINISTRATIVE | Facility: OTHER | Age: 60
End: 2020-05-22

## 2020-05-22 ENCOUNTER — PATIENT MESSAGE (OUTPATIENT)
Dept: RHEUMATOLOGY | Facility: CLINIC | Age: 60
End: 2020-05-22
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
## 2020-05-25 ENCOUNTER — OFFICE VISIT (OUTPATIENT)
Dept: SPINE | Facility: CLINIC | Age: 60
End: 2020-05-25
Attending: PHYSICAL MEDICINE & REHABILITATION
Payer: MEDICARE

## 2020-05-25 VITALS
SYSTOLIC BLOOD PRESSURE: 141 MMHG | BODY MASS INDEX: 27.34 KG/M2 | WEIGHT: 154.31 LBS | HEIGHT: 63 IN | DIASTOLIC BLOOD PRESSURE: 67 MMHG | HEART RATE: 94 BPM

## 2020-05-25 DIAGNOSIS — M47.816 SPONDYLOSIS OF LUMBAR REGION WITHOUT MYELOPATHY OR RADICULOPATHY: Primary | ICD-10-CM

## 2020-05-25 DIAGNOSIS — M05.79 RHEUMATOID ARTHRITIS INVOLVING MULTIPLE SITES WITH POSITIVE RHEUMATOID FACTOR: ICD-10-CM

## 2020-05-25 DIAGNOSIS — M25.551 RIGHT HIP PAIN: ICD-10-CM

## 2020-05-25 PROCEDURE — 99999 PR PBB SHADOW E&M-EST. PATIENT-LVL III: ICD-10-PCS | Mod: PBBFAC,,, | Performed by: PHYSICAL MEDICINE & REHABILITATION

## 2020-05-25 PROCEDURE — 3008F BODY MASS INDEX DOCD: CPT | Mod: CPTII,S$GLB,, | Performed by: PHYSICAL MEDICINE & REHABILITATION

## 2020-05-25 PROCEDURE — 99999 PR PBB SHADOW E&M-EST. PATIENT-LVL III: CPT | Mod: PBBFAC,,, | Performed by: PHYSICAL MEDICINE & REHABILITATION

## 2020-05-25 PROCEDURE — 99214 PR OFFICE/OUTPT VISIT, EST, LEVL IV, 30-39 MIN: ICD-10-PCS | Mod: S$GLB,,, | Performed by: PHYSICAL MEDICINE & REHABILITATION

## 2020-05-25 PROCEDURE — 3008F PR BODY MASS INDEX (BMI) DOCUMENTED: ICD-10-PCS | Mod: CPTII,S$GLB,, | Performed by: PHYSICAL MEDICINE & REHABILITATION

## 2020-05-25 PROCEDURE — 99214 OFFICE O/P EST MOD 30 MIN: CPT | Mod: S$GLB,,, | Performed by: PHYSICAL MEDICINE & REHABILITATION

## 2020-05-29 ENCOUNTER — TELEPHONE (OUTPATIENT)
Dept: PAIN MEDICINE | Facility: CLINIC | Age: 60
End: 2020-05-29

## 2020-05-29 NOTE — TELEPHONE ENCOUNTER
Staff spoke with patient in regards to rescheduling appointment due to a change in  schedule which is now scheduled 6/17/20 at 10:30am patient was also advised of direct line to call upon arrival.

## 2020-06-03 ENCOUNTER — OFFICE VISIT (OUTPATIENT)
Dept: ORTHOPEDICS | Facility: CLINIC | Age: 60
End: 2020-06-03
Payer: MEDICARE

## 2020-06-03 DIAGNOSIS — M21.42 PES PLANOVALGUS, ACQUIRED, LEFT: ICD-10-CM

## 2020-06-03 DIAGNOSIS — M66.872 NONTRAUMATIC RUPTURE OF POSTERIOR TIBIAL TENDON, LEFT: Primary | ICD-10-CM

## 2020-06-03 PROCEDURE — 99999 PR PBB SHADOW E&M-EST. PATIENT-LVL II: ICD-10-PCS | Mod: PBBFAC,,, | Performed by: ORTHOPAEDIC SURGERY

## 2020-06-03 PROCEDURE — 99214 OFFICE O/P EST MOD 30 MIN: CPT | Mod: S$GLB,,, | Performed by: ORTHOPAEDIC SURGERY

## 2020-06-03 PROCEDURE — 99214 PR OFFICE/OUTPT VISIT, EST, LEVL IV, 30-39 MIN: ICD-10-PCS | Mod: S$GLB,,, | Performed by: ORTHOPAEDIC SURGERY

## 2020-06-03 PROCEDURE — 99999 PR PBB SHADOW E&M-EST. PATIENT-LVL II: CPT | Mod: PBBFAC,,, | Performed by: ORTHOPAEDIC SURGERY

## 2020-06-03 NOTE — PROGRESS NOTES
Subjective:      Patient ID: Joyce Peterson is a 59 y.o. female.    Chief Complaint:  Left foot ankle pain  HPI:  This is a 59-year-old female with rheumatoid arthritis and osteoporosis who I have seen in the past for stress fractures in her right foot.  It has been almost 3 years since I have seen her.  Since I have seen her she has had more stress fractures which have been managed by Podiatry.  She comes in today because of left hindfoot and ankle pain.  She reports that 1 week ago she obtain custom orthotics for her feet which are helping some but she continues to have lateral hindfoot pain with weight-bearing.  She reports that her left arch appears to be collapsed.  She comes in for evaluation.    Past Medical History:   Diagnosis Date    Acid reflux     Allergy     Anemia     Asthma     Coronary artery disease     Degenerative disc disease     Dry eyes     Dry mouth     Gastroparesis     Hyperlipidemia     Lateral meniscus derangement 4/6/2016    Lobular carcinoma in situ     Osteoarthritis     Osteoporosis     Rheumatoid arthritis(714.0)     Umbilical hernia 8/13/2015         ROS:  Negative for chest pain, shortness of breath, fevers, or unexplained weight loss.      Objective:      This is a pleasant well-developed well-nourished 5 ft 3 in, 154 lb female who walks in with a normal gait.  On standing inspection without her shoes on she has pes planovalgus alignment of her left foot and ankle.  She has pes planus alignment of her right foot as well.  She is able to do a single limb heel rise on the right but she cannot do a single limb heel rise on the left.  She is noted to have some swelling on the medial aspect of her left hindfoot.  On sitting exam she has painless motion of her ankle and subtalar joint.  There is some fullness along the course of the posterior tibial tendon but she is not really tender.  She is tender in the sub fibular area.  She has fairly supple heel cord with her  left foot held in a subtalar neutral position.  She is neurovascularly intact.                Assessment:       1. Nontraumatic rupture of posterior tibial tendon, left     2. Pes planovalgus, acquired, left             Plan:       I suspect that she has stage II posterior tibial tendon dysfunction.  Her symptoms seem to be mostly due to hindfoot impingement.  I evaluated her orthotics and they are well built but I would like to see if the orthotists could add more medial posting to help unload her lateral hindfoot.  If he cannot get her enough relief the orthotic then we may consider an [place] AFO or some other custom AFO to help control her foot alignment.  She would like to avoid surgery.  She will try the conservative measures.  If she returns to see me she will need standing x-ray of the left foot.

## 2020-06-05 ENCOUNTER — PATIENT MESSAGE (OUTPATIENT)
Dept: INTERNAL MEDICINE | Facility: CLINIC | Age: 60
End: 2020-06-05

## 2020-06-05 ENCOUNTER — TELEPHONE (OUTPATIENT)
Dept: FAMILY MEDICINE | Facility: CLINIC | Age: 60
End: 2020-06-05

## 2020-06-05 RX ORDER — AZITHROMYCIN 250 MG/1
TABLET, FILM COATED ORAL
Qty: 6 TABLET | Refills: 0 | Status: SHIPPED | OUTPATIENT
Start: 2020-06-05 | End: 2020-06-10

## 2020-06-05 NOTE — TELEPHONE ENCOUNTER
Patient is a patient of Dr. Peters she is out on maternity leave, she is asking for medication see message below.   I have started with bronchitis again. I have been having sinus for over 2 weeks then it became a drip in my throat and now it is in my chest. I am coughing, wheezing, short of breath. I also have a stuffy nose that is still dripping down my throat. I have no fever or aches & pains. I am taking Alavert antihistamine & started my Albuterol breathing treatments. I am also taking cough pearls at night. Oh and I am taking Mucinex. I just want to remind you I am immune compromised bc of the Kevzara arthritis medication I am taking. I would like to start antibiotics, can you please send me some. Please don't send Augmentum as it hurts my stomach.

## 2020-06-05 NOTE — TELEPHONE ENCOUNTER
----- Message from Namita Hirsch sent at 6/5/2020 11:13 AM CDT -----  Pt is requesting a call back to speak with Nurse about her appointment she had for 10:45 am,pt states Doctor never called her and she is still wanting     Please call and advise          Thank you

## 2020-06-05 NOTE — TELEPHONE ENCOUNTER
----- Message from Pili Lemus sent at 6/5/2020  2:35 PM CDT -----  Contact: Pt/ [phone]  Pt is requesting a callback    Please call

## 2020-06-10 ENCOUNTER — PATIENT MESSAGE (OUTPATIENT)
Dept: PULMONOLOGY | Facility: CLINIC | Age: 60
End: 2020-06-10

## 2020-06-10 RX ORDER — ALBUTEROL SULFATE 1.25 MG/3ML
1.25 SOLUTION RESPIRATORY (INHALATION) EVERY 6 HOURS PRN
Qty: 1 BOX | Refills: 11 | Status: SHIPPED | OUTPATIENT
Start: 2020-06-10 | End: 2021-06-10

## 2020-06-10 RX ORDER — ALBUTEROL SULFATE 90 UG/1
2 AEROSOL, METERED RESPIRATORY (INHALATION) EVERY 6 HOURS PRN
Qty: 20.1 G | Refills: 11 | Status: SHIPPED | OUTPATIENT
Start: 2020-06-10 | End: 2022-03-16

## 2020-06-11 ENCOUNTER — TELEPHONE (OUTPATIENT)
Dept: OTOLARYNGOLOGY | Facility: CLINIC | Age: 60
End: 2020-06-11

## 2020-06-11 DIAGNOSIS — Z01.812 PRE-PROCEDURE LAB EXAM: ICD-10-CM

## 2020-06-11 RX ORDER — NAPROXEN 500 MG/1
500 TABLET ORAL 2 TIMES DAILY PRN
Qty: 180 TABLET | Refills: 0 | Status: SHIPPED | OUTPATIENT
Start: 2020-06-11 | End: 2020-09-08 | Stop reason: SDUPTHER

## 2020-06-11 NOTE — TELEPHONE ENCOUNTER
----- Message from Milan Flores sent at 6/11/2020  1:55 PM CDT -----  Contact: self  Type:  Needs Medical Advice    Who Called: self  Reason: scheduled to go get covid 19 test and she wanted to see if she can just go today   Would the patient rather a call back or a response via [facility]? self  Best Call Back Number: [phone]  Additional Information: none

## 2020-06-11 NOTE — TELEPHONE ENCOUNTER
Returned patients call and explained to her that her Covid test needed to be done 48 hours before her office visit with .Mrs. Peterson thought she could go at anytime. Provided her of date and time and address where her Covid test was scheduled. Patient voice understanding.

## 2020-06-15 ENCOUNTER — TELEPHONE (OUTPATIENT)
Dept: PHARMACY | Facility: CLINIC | Age: 60
End: 2020-06-15

## 2020-06-15 ENCOUNTER — TELEPHONE (OUTPATIENT)
Dept: OTOLARYNGOLOGY | Facility: CLINIC | Age: 60
End: 2020-06-15

## 2020-06-15 NOTE — TELEPHONE ENCOUNTER
----- Message from Mariel See sent at 6/15/2020 10:28 AM CDT -----  Regarding: orders  Good morning  Please re enter covid testing orders  Patient is currently here at outpatient  Thank you

## 2020-06-16 ENCOUNTER — PATIENT OUTREACH (OUTPATIENT)
Dept: ADMINISTRATIVE | Facility: OTHER | Age: 60
End: 2020-06-16

## 2020-06-17 ENCOUNTER — OFFICE VISIT (OUTPATIENT)
Dept: PAIN MEDICINE | Facility: CLINIC | Age: 60
End: 2020-06-17
Attending: PHYSICAL MEDICINE & REHABILITATION
Payer: MEDICARE

## 2020-06-17 ENCOUNTER — OFFICE VISIT (OUTPATIENT)
Dept: OTOLARYNGOLOGY | Facility: CLINIC | Age: 60
End: 2020-06-17
Payer: MEDICARE

## 2020-06-17 ENCOUNTER — TELEPHONE (OUTPATIENT)
Dept: PAIN MEDICINE | Facility: CLINIC | Age: 60
End: 2020-06-17

## 2020-06-17 VITALS
HEART RATE: 105 BPM | WEIGHT: 155.13 LBS | BODY MASS INDEX: 27.47 KG/M2 | SYSTOLIC BLOOD PRESSURE: 108 MMHG | DIASTOLIC BLOOD PRESSURE: 74 MMHG

## 2020-06-17 VITALS
RESPIRATION RATE: 18 BRPM | HEART RATE: 107 BPM | BODY MASS INDEX: 27.39 KG/M2 | SYSTOLIC BLOOD PRESSURE: 158 MMHG | OXYGEN SATURATION: 100 % | DIASTOLIC BLOOD PRESSURE: 93 MMHG | TEMPERATURE: 99 F | WEIGHT: 154.56 LBS | HEIGHT: 63 IN

## 2020-06-17 DIAGNOSIS — M25.551 RIGHT HIP PAIN: ICD-10-CM

## 2020-06-17 DIAGNOSIS — M05.79 RHEUMATOID ARTHRITIS INVOLVING MULTIPLE SITES WITH POSITIVE RHEUMATOID FACTOR: ICD-10-CM

## 2020-06-17 DIAGNOSIS — J38.2 VOCAL CORD NODULE: ICD-10-CM

## 2020-06-17 DIAGNOSIS — M47.816 SPONDYLOSIS OF LUMBAR REGION WITHOUT MYELOPATHY OR RADICULOPATHY: ICD-10-CM

## 2020-06-17 DIAGNOSIS — M35.01 SJOGREN'S SYNDROME WITH KERATOCONJUNCTIVITIS SICCA: ICD-10-CM

## 2020-06-17 DIAGNOSIS — J31.0 CHRONIC RHINITIS: Primary | ICD-10-CM

## 2020-06-17 DIAGNOSIS — D84.9 IMMUNOSUPPRESSED STATUS: ICD-10-CM

## 2020-06-17 DIAGNOSIS — R49.0 DYSPHONIA: ICD-10-CM

## 2020-06-17 DIAGNOSIS — M16.11 PRIMARY OSTEOARTHRITIS OF RIGHT HIP: Primary | ICD-10-CM

## 2020-06-17 DIAGNOSIS — J45.21 MILD INTERMITTENT ASTHMA WITH EXACERBATION: ICD-10-CM

## 2020-06-17 PROCEDURE — 3008F PR BODY MASS INDEX (BMI) DOCUMENTED: ICD-10-PCS | Mod: CPTII,S$GLB,, | Performed by: OTOLARYNGOLOGY

## 2020-06-17 PROCEDURE — 31575 DIAGNOSTIC LARYNGOSCOPY: CPT | Mod: S$GLB,,, | Performed by: OTOLARYNGOLOGY

## 2020-06-17 PROCEDURE — 99999 PR PBB SHADOW E&M-EST. PATIENT-LVL V: ICD-10-PCS | Mod: PBBFAC,,, | Performed by: OTOLARYNGOLOGY

## 2020-06-17 PROCEDURE — 99214 OFFICE O/P EST MOD 30 MIN: CPT | Mod: 25,S$GLB,, | Performed by: OTOLARYNGOLOGY

## 2020-06-17 PROCEDURE — 99214 PR OFFICE/OUTPT VISIT, EST, LEVL IV, 30-39 MIN: ICD-10-PCS | Mod: S$GLB,,, | Performed by: ANESTHESIOLOGY

## 2020-06-17 PROCEDURE — 99214 OFFICE O/P EST MOD 30 MIN: CPT | Mod: S$GLB,,, | Performed by: ANESTHESIOLOGY

## 2020-06-17 PROCEDURE — 3008F BODY MASS INDEX DOCD: CPT | Mod: CPTII,S$GLB,, | Performed by: OTOLARYNGOLOGY

## 2020-06-17 PROCEDURE — 3008F PR BODY MASS INDEX (BMI) DOCUMENTED: ICD-10-PCS | Mod: CPTII,S$GLB,, | Performed by: ANESTHESIOLOGY

## 2020-06-17 PROCEDURE — 99999 PR PBB SHADOW E&M-EST. PATIENT-LVL V: CPT | Mod: PBBFAC,,, | Performed by: ANESTHESIOLOGY

## 2020-06-17 PROCEDURE — 31575 PR LARYNGOSCOPY, FLEXIBLE; DIAGNOSTIC: ICD-10-PCS | Mod: S$GLB,,, | Performed by: OTOLARYNGOLOGY

## 2020-06-17 PROCEDURE — 99999 PR PBB SHADOW E&M-EST. PATIENT-LVL V: CPT | Mod: PBBFAC,,, | Performed by: OTOLARYNGOLOGY

## 2020-06-17 PROCEDURE — 3008F BODY MASS INDEX DOCD: CPT | Mod: CPTII,S$GLB,, | Performed by: ANESTHESIOLOGY

## 2020-06-17 PROCEDURE — 99214 PR OFFICE/OUTPT VISIT, EST, LEVL IV, 30-39 MIN: ICD-10-PCS | Mod: 25,S$GLB,, | Performed by: OTOLARYNGOLOGY

## 2020-06-17 PROCEDURE — 99999 PR PBB SHADOW E&M-EST. PATIENT-LVL V: ICD-10-PCS | Mod: PBBFAC,,, | Performed by: ANESTHESIOLOGY

## 2020-06-17 RX ORDER — METHYLPREDNISOLONE 4 MG/1
TABLET ORAL
Qty: 1 PACKAGE | Refills: 0 | Status: SHIPPED | OUTPATIENT
Start: 2020-06-17 | End: 2020-08-13

## 2020-06-17 RX ORDER — BUPIVACAINE HYDROCHLORIDE 2.5 MG/ML
10 INJECTION, SOLUTION EPIDURAL; INFILTRATION; INTRACAUDAL
Status: DISCONTINUED | OUTPATIENT
Start: 2020-06-17 | End: 2020-08-13

## 2020-06-17 RX ORDER — BETAMETHASONE SODIUM PHOSPHATE AND BETAMETHASONE ACETATE 3; 3 MG/ML; MG/ML
6 INJECTION, SUSPENSION INTRA-ARTICULAR; INTRALESIONAL; INTRAMUSCULAR; SOFT TISSUE ONCE
Status: DISCONTINUED | OUTPATIENT
Start: 2020-06-17 | End: 2022-02-01

## 2020-06-17 NOTE — LETTER
June 17, 2020      Janae Woodward MD  [address]  [address]  [facility]  [facility] [address]           [facility]  [address]  [facility] [address]  Phone: [phone]  Fax: [phone]          Patient: Joyce Peterson   MR Number: [id number]   YOB: 1960   Date of Visit: 6/17/2020       Dear Dr. Janae Woodward:    Thank you for referring Joyce Peterson to me for evaluation. Attached you will find relevant portions of my assessment and plan of care.    If you have questions, please do not hesitate to call me. I look forward to following Joyce Peterson along with you.    Sincerely,    Tj Mayfield MD    Enclosure  CC:  No Recipients    If you would like to receive this communication electronically, please contact [email or web address] or [phone] to request more information on [employer] access.    For providers and/or their staff who would like to refer a patient to [facility], please contact us through our one-stop-shop provider referral line, [facility], at [phone].    If you feel you have received this communication in error or would no longer like to receive these types of communications, please e-mail [email or web address]

## 2020-06-17 NOTE — PROCEDURES
Procedures     Due to indication in patient's history, presentation or risk factors,  a fiber optic exam was performed.    SEPARATE PROCEDURE NOTE:    ANESTHESIA:  Topical xylocaine with coleman-synephrine    FINDINGS:  Bilateral true vocal fold nodules noted      PROCEDURE:  After verbal consent was obtained, the flexible scope was passed through the patient's nasal cavity without difficulty.  The nasopharynx (adenoid pad) and eustachian tube orifices were first visualized and were found to be normal, without masses or irregularity.  The posterior pharyngeal wall and base of tongue were then examined and no mass or irregular tissue was seen.  The scope was then advanced to the larynx, and the epiglottis, valleculae, and piriform sinuses were normal, without masses or mucosal irregularity.  The false vocal folds and true vocal folds were then examined and were found to have normal mobility (full abduction and adduction) and no masses or mucosal irregularity was seen. Bilateral vocal cord nodules noted.  The arytenoids and the interarytenoid area were normal. The patient tolerated the procedure well without complications.

## 2020-06-17 NOTE — PROGRESS NOTES
Subjective:      Patient ID: Joyce Peterson is a 59 y.o. female.    Chief Complaint: No chief complaint on file.    Referred by: Janae Woodward, *     HPI  59yF previously seen in procedure area with good relief of pain from bilateral L4-L5 and L5-S1 facet injections with pmhx significant for RA, osteoporosis, and multiple fractures who presents today as a referral for right hip and leg pain. Her pain started many years ago but hip pain recently worsened over the last 2 months. It now radiates into the right groin. MRI imaging performed on 9/2019 showed only mild OA and mild tendinitis. No new trauma. Continues with midline low back pain that radiates posteriorly when she walks into bilateral posterior thighs but not past the knees. Improved with bending forward and rest. MRI with severe spinal canal stenosis at L3-L4. This pain was previously helped with bilateral injections as below.    Interventional Pain History  3/2020 Bilateral L4-L5 and L5-S1 Facet Injections - 2 weeks 90% relief, progressively returning but continues to be improved  Past Medical History:   Diagnosis Date    Acid reflux     Allergy     Anemia     Asthma     Coronary artery disease     Degenerative disc disease     Dry eyes     Dry mouth     Gastroparesis     Hyperlipidemia     Lateral meniscus derangement 4/6/2016    Lobular carcinoma in situ     Osteoarthritis     Osteoporosis     Rheumatoid arthritis(714.0)     Umbilical hernia 8/13/2015       Past Surgical History:   Procedure Laterality Date    BREAST BIOPSY Left 01/29/2002    core bx    CARPAL TUNNEL RELEASE Right 05/2017    CHOLECYSTECTOMY  2004    COLONOSCOPY      10/11    COLONOSCOPY N/A 6/29/2017    Procedure: COLONOSCOPY;  Surgeon: López Moore MD;  Location: [facility] ([address] [place] [address]);  Service: Endoscopy;  Laterality: N/A;    INJECTION OF FACET JOINT Bilateral 3/12/2020    Procedure: FACET JOINT INJECTION (LUMBAR BLOCK) BILATERAL L4-5 AND L5-S1 DIRECT  "REFERRAL;  Surgeon: Tj Mayfield MD;  Location: [facility];  Service: Pain Management;  Laterality: Bilateral;  NEEDS CONSENT    INTRAMEDULLARY RODDING OF FEMUR Left 5/21/2019    Procedure: INSERTION, INTRAMEDULLARY ARIEL, FEMUR;  Surgeon: López Duff MD;  Location: [facility] OR 2ND FLR;  Service: Orthopedics;  Laterality: Left;    REPAIR OF TRICEPS TENDON Left 10/17/2018    Procedure: REPAIR, TENDON, TRICEPS left elbow;  Surgeon: Staci Yarbrough MD;  Location: [facility] OR 1ST FLR;  Service: Orthopedics;  Laterality: Left;  Anesthesia: General and regional. PRONE, k-wire , hand pan 1 and pan 2, CALL ARTHREX/Tamera notified 10-12     TONSILLECTOMY      TUBAL LIGATION  2003    UPPER GASTROINTESTINAL ENDOSCOPY      10/11    uterine ablation  2003       Review of patient's allergies indicates:   Allergen Reactions    Actemra [tocilizumab] Other (See Comments)     Severe dizziness    Codeine Nausea And Vomiting    Gold au 198 Hives and Rash    Hydroxychloroquine Other (See Comments)     Can't remember the reaction      Iodinated contrast media Other (See Comments)     Other reaction(s): BURNING ALL OVER    Iodine Other (See Comments)     Other reaction(s): BURNING ALL OVER - Iodine dye - Not topical    Sulfa (sulfonamide antibiotics) Other (See Comments)     Can't remember the reaction    Zofran [ondansetron hcl (pf)] Nausea And Vomiting     Pt reports that last time she received zofran she started vomiting again    Methotrexate analogues Nausea Only    Pneumovax 23 [pneumococcal 23-argenis ps vaccine] Other (See Comments)     "sick"       Current Outpatient Medications   Medication Sig Dispense Refill    albuterol (ACCUNEB) 1.25 mg/3 mL Nebu Take 3 mLs (1.25 mg total) by nebulization every 6 (six) hours as needed. Rescue 1 Box 11    albuterol (PROVENTIL HFA) 90 mcg/actuation inhaler Inhale 2 puffs into the lungs every 6 (six) hours as needed. Rescue 20.1 g 11    aspirin 81 mg Tab Take 81 mg " by mouth every morning.       azelastine (ASTELIN) 137 mcg nasal spray 1 spray (137 mcg total) by Nasal route 2 (two) times daily. 30 mL 11    benzonatate (TESSALON) 100 MG capsule Take 1 capsule by mouth three times a day 9 capsule 0    budesonide-formoterol 160-4.5 mcg (SYMBICORT) 160-4.5 mcg/actuation HFAA Inhale 2 puffs into the lungs every 12 (twelve) hours. 3 Inhaler 3    calcium citrate-vitamin D (CITRACAL + D) 315-200 mg-unit per tablet Take 1 tablet by mouth 2 (two) times daily.       diclofenac sodium (VOLTAREN) 1 % Gel APPLY 2 G TOPICALLY 4 (FOUR) TIMES DAILY AS NEEDED. 500 g 5    doxycycline (MONODOX) 50 MG Cap Take 1 capsule (50 mg total) by mouth 2 (two) times daily. 60 capsule 6    fluticasone propionate (FLONASE) 50 mcg/actuation nasal spray 2 sprays (100 mcg total) by Each Nostril route once daily. 9.9 mL 11    GUAIFENESIN (MUCINEX ORAL) Take 400 mg by mouth every 4 (four) hours as needed.       INV PROPULSID 10 MG Take 1 tablets (20 mg) by mouth 4 (four) times daily. FOR INVESTIGATIONAL USE ONLY. Protocol CIS-USA-154 1440 each 0    leflunomide (ARAVA) 20 MG Tab Take 1 tablet (20 mg total) by mouth once daily. 90 tablet 0    lifitegrast (XIIDRA) 5 % Dpet Place 1 drop into both eyes 2 (two) times daily. 60 each 12    montelukast (SINGULAIR) 10 mg tablet Take 1 tablet (10 mg total) by mouth nightly. 90 tablet 3    naproxen (NAPROSYN) 500 MG tablet Take 1 tablet (500 mg total) by mouth 2 (two) times daily as needed. 180 tablet 0    omeprazole (PRILOSEC) 40 MG capsule Take 1 capsule (40 mg total) by mouth every morning. 30 capsule 6    omeprazole-sodium bicarbonate (ZEGERID) 40-1.1 mg-gram per capsule TAKE 1 CAPSULE BY MOUTH EVERY MORNING. 90 capsule 3    POTASSIUM ORAL Take by mouth once daily.      predniSONE (DELTASONE) 1 MG tablet TAKE 1 TABLET BY MOUTH EVERY DAY 30 tablet 1    PREMARIN 0.625 mg tablet Take 0.625 mg by mouth once daily.  4    PREMARIN vaginal cream PLACE 0.5 GRAM  "VAGINALLY 3 (THREE) TIMES A WEEK. 30 g 1    progesterone (PROMETRIUM) 100 MG capsule Take 100 mg by mouth every morning. 1 Capsule Oral Every day, morning      RESTASIS 0.05 % ophthalmic emulsion PLACE 0.4 MLS (1 DROP TOTAL) INTO BOTH EYES 2 (TWO) TIMES DAILY. 60 vial 5    rizatriptan (MAXALT) 10 MG tablet Take 10 mg by mouth as needed for Migraine.       rosuvastatin (CRESTOR) 20 MG tablet Take 1 tablet (20 mg total) by mouth every evening. 90 tablet 3    sarilumab (KEVZARA) 200 mg/1.14 mL Syrg Inject 1.14 mLs (200 mg total) into the skin every 14 (fourteen) days. 2.28 mL 2    sucralfate (CARAFATE) 1 gram tablet TAKE 1 TABLET (1 G TOTAL) BY MOUTH 4 (FOUR) TIMES DAILY. 360 tablet 3    syringe with needle (TUBERCULIN SYRINGE) 1 mL 27 x 1/2" Syrg To administer methotrexate 7.5mg sc once a week 12 Syringe 3    teriparatide (FORTEO) 20 mcg/dose - 600 mcg/2.4 mL PnIj Inject 0.08 mLs (20 mcg total) into the skin once daily. 2.4 mL 11    traMADol (ULTRAM) 50 mg tablet Take 1 tablet (50 mg total) by mouth every 24 hours as needed for Pain. 7 tablet 0    valACYclovir (VALTREX) 1000 MG tablet TAKE 1 TABLET BY MOUTH TWICE A DAY AS NEEDED 20 tablet 3    diazePAM (VALIUM) 5 MG tablet Take 1 tablet (5 mg total) by mouth every 6 (six) hours as needed for Anxiety. 2 tablet 0     No current facility-administered medications for this visit.      Facility-Administered Medications Ordered in Other Visits   Medication Dose Route Frequency Provider Last Rate Last Dose    0.9%  NaCl infusion   Intravenous Continuous Callum Singer MD   Stopped at 05/21/19 1100       Family History   Problem Relation Age of Onset    Cancer Mother         Lung Cancer    Emphysema Mother     Heart attack Mother     Cancer Father         Lung Cancer    Osteoarthritis Father     Lung cancer Father     Skin cancer Father     Heart disease Brother     Heart attack Brother     Osteoarthritis Paternal Aunt     Retinal detachment Maternal " Aunt     No Known Problems Sister     No Known Problems Maternal Uncle     No Known Problems Paternal Uncle     No Known Problems Maternal Grandmother     No Known Problems Maternal Grandfather     No Known Problems Paternal Grandmother     No Known Problems Paternal Grandfather     Colon cancer Neg Hx     Esophageal cancer Neg Hx     Stomach cancer Neg Hx     Celiac disease Neg Hx     Diabetes Neg Hx     Thyroid disease Neg Hx     Amblyopia Neg Hx     Blindness Neg Hx     Cataracts Neg Hx     Glaucoma Neg Hx     Hypertension Neg Hx     Macular degeneration Neg Hx     Strabismus Neg Hx     Stroke Neg Hx        Social History     Socioeconomic History    Marital status:      Spouse name: Not on file    Number of children: Not on file    Years of education: Not on file    Highest education level: Not on file   Occupational History    Not on file   Social Needs    Financial resource strain: Not on file    Food insecurity     Worry: Not on file     Inability: Not on file    Transportation needs     Medical: Not on file     Non-medical: Not on file   Tobacco Use    Smoking status: Never Smoker    Smokeless tobacco: Never Used   Substance and Sexual Activity    Alcohol use: Yes     Comment: occasionally    Drug use: No    Sexual activity: Yes     Partners: Male     Birth control/protection: Surgical   Lifestyle    Physical activity     Days per week: Not on file     Minutes per session: Not on file    Stress: Not on file   Relationships    Social connections     Talks on phone: Not on file     Gets together: Not on file     Attends Taoism service: Not on file     Active member of club or organization: Not on file     Attends meetings of clubs or organizations: Not on file     Relationship status: Not on file   Other Topics Concern    Are you pregnant or think you may be? Not Asked    Breast-feeding Not Asked   Social History Narrative    Not on file           Review of  "Systems   Constitution: Negative. Negative for decreased appetite.   HENT: Negative.  Negative for congestion.    Eyes: Negative.  Negative for blurred vision.   Cardiovascular: Negative for chest pain.   Respiratory: Negative for cough.    Endocrine: Negative for cold intolerance.   Hematologic/Lymphatic: Negative for adenopathy.   Skin: Negative for nail changes.   Musculoskeletal: Negative for arthritis.   Gastrointestinal: Negative.  Negative for bloating.   Genitourinary: Negative for bladder incontinence.   Neurological: Negative for aphonia.   Psychiatric/Behavioral: Negative for altered mental status.   Allergic/Immunologic: Negative for hives.           Objective:   BP (!) 158/93   Pulse 107   Temp 98.6 °F (37 °C)   Resp 18   Ht 5' 3" (1.6 m)   Wt 70.1 kg (154 lb 8.7 oz)   LMP  (LMP Unknown)   SpO2 100%   BMI 27.38 kg/m²   Pain Disability Index Review:  Last 3 PDI Scores 6/17/2020 8/13/2014   Pain Disability Index (PDI) 55 47     BP (!) 158/93   Pulse 107   Temp 98.6 °F (37 °C)   Resp 18   Ht 5' 3" (1.6 m)   Wt 70.1 kg (154 lb 8.7 oz)   LMP  (LMP Unknown)   SpO2 100%   BMI 27.38 kg/m²   GEN: No apparent distress. Affect normal.   HEENT: Normocephalic, Atraumatic; EOM intact  CV: regular rate and rhythm  RESP: no increased work of breathing  ABD: soft, non-tender, non-distended, normal bowel sounds  SKIN: intact, No rashes     LUMBAR SPINE EXAM:   INSPECTION: No gross deformities or abnormalities noted.   RANGE OF MOTION: Full without limitiation  STABILITY: No instability noted/appreciated.   TENDERNESS TO PALPATION: + ttp over bilatral SIJ  FACET LOADING: - bilatrally  STRAIGHT LEG RAISE: Unremarkable bilaterally.   FEMORAL NERVE STRETCH TEST: Negative bilaterally  FABERE TEST: Pain reproduced in hip on right  FADIR MANEUVER: neg bilaterally  HIP EXAM: Pain with internal rotation of right hip  MUSCLE STRENGTH: 5/5 and symmetrical bilateral lower extremities.   SENSORY EXAM: Intact to light " touch, bilateral lower extremities.   DTRs: 2+ and symmetrical.     PATHOLOGICAL REFLEXES:   GRUBBS'S: Negative bilaterally.   CLONUS: Negative bilaterally.   BABINSKI: Downgoing plantar response bilaterally.      Assessment:       Encounter Diagnoses   Name Primary?    Spondylosis of lumbar region without myelopathy or radiculopathy     Rheumatoid arthritis involving multiple sites with positive rheumatoid factor     Right hip pain          Plan:   We discussed with the patient the assessment and recommendations. The following is the plan we agreed on:    - Schedule for right hip intra-articular injection for diagnostic and therapeutic purposes.  - If injection relieves right hip and groin pain, then we will consider repeat procedure vs orthopedic referral depending on length of relief  - If groin pain and posterior thigh pain is unrelieved, then consider right L3 TFESI versus right L4-L5 and L5-S1 facet.  - Continue home exercises and medication management.  - Return for procedure and then 2 week f/u to discuss next plan of action.      Diagnoses and all orders for this visit:    Spondylosis of lumbar region without myelopathy or radiculopathy  -     Ambulatory referral/consult to Pain Clinic    Rheumatoid arthritis involving multiple sites with positive rheumatoid factor  -     Ambulatory referral/consult to Pain Clinic    Right hip pain  -     Ambulatory referral/consult to Pain Clinic       Ayan Stringer MD  I have personally taken the history and examined this patient and agree with the resident's note as stated above.

## 2020-06-24 ENCOUNTER — OFFICE VISIT (OUTPATIENT)
Dept: PULMONOLOGY | Facility: CLINIC | Age: 60
End: 2020-06-24
Payer: MEDICARE

## 2020-06-24 ENCOUNTER — HOSPITAL ENCOUNTER (OUTPATIENT)
Dept: RADIOLOGY | Facility: HOSPITAL | Age: 60
Discharge: HOME | End: 2020-06-24
Attending: INTERNAL MEDICINE
Payer: MEDICARE

## 2020-06-24 VITALS
HEIGHT: 63 IN | SYSTOLIC BLOOD PRESSURE: 112 MMHG | WEIGHT: 154.31 LBS | OXYGEN SATURATION: 97 % | BODY MASS INDEX: 27.34 KG/M2 | HEART RATE: 109 BPM | DIASTOLIC BLOOD PRESSURE: 71 MMHG

## 2020-06-24 DIAGNOSIS — J45.20 MILD INTERMITTENT ASTHMA WITHOUT COMPLICATION: ICD-10-CM

## 2020-06-24 DIAGNOSIS — J30.1 ALLERGIC RHINITIS DUE TO POLLEN, UNSPECIFIED SEASONALITY: ICD-10-CM

## 2020-06-24 DIAGNOSIS — R07.9 CHEST PAIN, UNSPECIFIED TYPE: Primary | ICD-10-CM

## 2020-06-24 DIAGNOSIS — R07.9 CHEST PAIN, UNSPECIFIED TYPE: ICD-10-CM

## 2020-06-24 DIAGNOSIS — K21.9 GASTROESOPHAGEAL REFLUX DISEASE WITHOUT ESOPHAGITIS: ICD-10-CM

## 2020-06-24 PROCEDURE — 71046 XR CHEST PA AND LATERAL: ICD-10-PCS | Mod: 26,,, | Performed by: RADIOLOGY

## 2020-06-24 PROCEDURE — 3008F BODY MASS INDEX DOCD: CPT | Mod: CPTII,S$GLB,, | Performed by: INTERNAL MEDICINE

## 2020-06-24 PROCEDURE — 99214 OFFICE O/P EST MOD 30 MIN: CPT | Mod: S$GLB,,, | Performed by: INTERNAL MEDICINE

## 2020-06-24 PROCEDURE — 99214 PR OFFICE/OUTPT VISIT, EST, LEVL IV, 30-39 MIN: ICD-10-PCS | Mod: S$GLB,,, | Performed by: INTERNAL MEDICINE

## 2020-06-24 PROCEDURE — 71046 X-RAY EXAM CHEST 2 VIEWS: CPT | Mod: TC

## 2020-06-24 PROCEDURE — 99999 PR PBB SHADOW E&M-EST. PATIENT-LVL V: CPT | Mod: PBBFAC,,, | Performed by: INTERNAL MEDICINE

## 2020-06-24 PROCEDURE — 99999 PR PBB SHADOW E&M-EST. PATIENT-LVL V: ICD-10-PCS | Mod: PBBFAC,,, | Performed by: INTERNAL MEDICINE

## 2020-06-24 PROCEDURE — 3008F PR BODY MASS INDEX (BMI) DOCUMENTED: ICD-10-PCS | Mod: CPTII,S$GLB,, | Performed by: INTERNAL MEDICINE

## 2020-06-24 PROCEDURE — 71046 X-RAY EXAM CHEST 2 VIEWS: CPT | Mod: 26,,, | Performed by: RADIOLOGY

## 2020-06-24 NOTE — ASSESSMENT & PLAN NOTE
Current symptoms of chest pain are related to costochondritis but will follow up with CXR.  Albuterol provides no relief of symptoms which argues against chronic asthma without wheeze.

## 2020-06-24 NOTE — PROGRESS NOTES
"Subjective:       Patient ID: Joyce Peterson is a 59 y.o. female.    Chief Complaint: Asthma    59  Year old with with a history of mild asthma.  Of note, she takes symbicort one puff twice daily and albuterol as needed for asthma symptoms that are primarily shortness of breath.  Two weeks ago had increased symptoms and increased nebulizer use with improved symptoms.  Patient has generalized symptoms that include chest pain.  Took albuterol this morning without relief.  Has a h/o RA and OA for which she takes naprosyn and other immunosuppressants. Had a CT in November for similar symptoms and was negative for pulmonary pathology.  No fevers, chills or sweats. Overall, patient feels that she is "not feeling well" and is fatigued.  Cannot exercise due to hip and back problems.  Flonase makes eyes swell.  Uses astelin intermittently     Review of Systems      Pain was worse on the left side.  Could only sleep on     Esophogeal dysmotility and states she has reflux symptoms "all the time".    Has difficulty with allergy medications due to drying effect as patient with autoimmune disease.    Patient reports that she has nodules on her vocal chords due to cough and Dr. Dougherty recommended she follow up with me for chronic cough.  Objective:       Vitals:    06/24/20 1535   BP: 112/71   BP Location: Left arm   Patient Position: Sitting   Pulse: 109   SpO2: 97%   Weight: 70 kg (154 lb 5.2 oz)   Height: 5' 3" (1.6 m)     Physical Exam   Constitutional: She is oriented to person, place, and time. She appears well-developed.   Cardiovascular: Normal rate and regular rhythm.   Pulmonary/Chest: She has no decreased breath sounds. She has no wheezes. She has no rales. She exhibits tenderness (substernal tenderness at sternocostal angle on the left).   Abdominal: Bowel sounds are normal.   Lymphadenopathy: No supraclavicular adenopathy is present.     She has no cervical adenopathy.   Neurological: She is alert and oriented to " person, place, and time.   Psychiatric: She has a normal mood and affect.        Personal Diagnostic Review  CT of chest performed on 11/2019 without contrast revealed no parenchymal lung disease.  Dilated esophagus.  Normal spirometry and diffusion  No flowsheet data found.      Assessment:       1. Chest pain, unspecified type    2. Mild intermittent asthma without complication    3. Allergic rhinitis due to pollen, unspecified seasonality    4. Gastroesophageal reflux disease without esophagitis        Outpatient Encounter Medications as of 6/24/2020   Medication Sig Dispense Refill    albuterol (ACCUNEB) 1.25 mg/3 mL Nebu Take 3 mLs (1.25 mg total) by nebulization every 6 (six) hours as needed. Rescue 1 Box 11    albuterol (PROVENTIL HFA) 90 mcg/actuation inhaler Inhale 2 puffs into the lungs every 6 (six) hours as needed. Rescue 20.1 g 11    aspirin 81 mg Tab Take 81 mg by mouth every morning.       azelastine (ASTELIN) 137 mcg nasal spray 1 spray (137 mcg total) by Nasal route 2 (two) times daily. 30 mL 11    benzonatate (TESSALON) 100 MG capsule Take 1 capsule by mouth three times a day 9 capsule 0    budesonide-formoterol 160-4.5 mcg (SYMBICORT) 160-4.5 mcg/actuation HFAA Inhale 2 puffs into the lungs every 12 (twelve) hours. 3 Inhaler 3    calcium citrate-vitamin D (CITRACAL + D) 315-200 mg-unit per tablet Take 1 tablet by mouth 2 (two) times daily.       diclofenac sodium (VOLTAREN) 1 % Gel APPLY 2 G TOPICALLY 4 (FOUR) TIMES DAILY AS NEEDED. 500 g 5    doxycycline (MONODOX) 50 MG Cap Take 1 capsule (50 mg total) by mouth 2 (two) times daily. 60 capsule 6    fluticasone propionate (FLONASE) 50 mcg/actuation nasal spray 2 sprays (100 mcg total) by Each Nostril route once daily. 9.9 mL 11    GUAIFENESIN (MUCINEX ORAL) Take 400 mg by mouth every 4 (four) hours as needed.       INV PROPULSID 10 MG Take 1 tablets (20 mg) by mouth 4 (four) times daily. FOR INVESTIGATIONAL USE ONLY. Protocol  "CIS-USA-154 1440 each 0    leflunomide (ARAVA) 20 MG Tab Take 1 tablet (20 mg total) by mouth once daily. 90 tablet 0    lifitegrast (XIIDRA) 5 % Dpet Place 1 drop into both eyes 2 (two) times daily. 60 each 12    methylPREDNISolone (MEDROL DOSEPACK) 4 mg tablet use as directed 1 Package 0    montelukast (SINGULAIR) 10 mg tablet Take 1 tablet (10 mg total) by mouth nightly. 90 tablet 3    naproxen (NAPROSYN) 500 MG tablet Take 1 tablet (500 mg total) by mouth 2 (two) times daily as needed. 180 tablet 0    omeprazole (PRILOSEC) 40 MG capsule Take 1 capsule (40 mg total) by mouth every morning. 30 capsule 6    omeprazole-sodium bicarbonate (ZEGERID) 40-1.1 mg-gram per capsule TAKE 1 CAPSULE BY MOUTH EVERY MORNING. 90 capsule 3    POTASSIUM ORAL Take by mouth once daily.      predniSONE (DELTASONE) 1 MG tablet TAKE 1 TABLET BY MOUTH EVERY DAY 30 tablet 1    PREMARIN 0.625 mg tablet Take 0.625 mg by mouth once daily.  4    PREMARIN vaginal cream PLACE 0.5 GRAM VAGINALLY 3 (THREE) TIMES A WEEK. 30 g 1    progesterone (PROMETRIUM) 100 MG capsule Take 100 mg by mouth every morning. 1 Capsule Oral Every day, morning      RESTASIS 0.05 % ophthalmic emulsion PLACE 0.4 MLS (1 DROP TOTAL) INTO BOTH EYES 2 (TWO) TIMES DAILY. 60 vial 5    rizatriptan (MAXALT) 10 MG tablet Take 10 mg by mouth as needed for Migraine.       rosuvastatin (CRESTOR) 20 MG tablet Take 1 tablet (20 mg total) by mouth every evening. 90 tablet 3    sarilumab (KEVZARA) 200 mg/1.14 mL Syrg Inject 1.14 mLs (200 mg total) into the skin every 14 (fourteen) days. 2.28 mL 2    sucralfate (CARAFATE) 1 gram tablet TAKE 1 TABLET (1 G TOTAL) BY MOUTH 4 (FOUR) TIMES DAILY. 360 tablet 3    syringe with needle (TUBERCULIN SYRINGE) 1 mL 27 x 1/2" Syrg To administer methotrexate 7.5mg sc once a week 12 Syringe 3    teriparatide (FORTEO) 20 mcg/dose - 600 mcg/2.4 mL PnIj Inject 0.08 mLs (20 mcg total) into the skin once daily. 2.4 mL 11    traMADol " (ULTRAM) 50 mg tablet Take 1 tablet (50 mg total) by mouth every 24 hours as needed for Pain. 7 tablet 0    valACYclovir (VALTREX) 1000 MG tablet TAKE 1 TABLET BY MOUTH TWICE A DAY AS NEEDED 20 tablet 3    diazePAM (VALIUM) 5 MG tablet Take 1 tablet (5 mg total) by mouth every 6 (six) hours as needed for Anxiety. 2 tablet 0     Facility-Administered Encounter Medications as of 6/24/2020   Medication Dose Route Frequency Provider Last Rate Last Dose    0.9%  NaCl infusion   Intravenous Continuous Callum Singer MD   Stopped at 05/21/19 1100    betamethasone acetate-betamethasone sodium phosphate injection 6 mg  6 mg Intra-articular Once Tj Mayfield MD        bupivacaine (PF) 0.25% (2.5 mg/ml) injection 25 mg  10 mL Intramuscular 1 time in Clinic/HOD Tj Mayfield MD         Orders Placed This Encounter   Procedures    X-Ray Chest PA And Lateral     Standing Status:   Future     Standing Expiration Date:   6/24/2021     Plan:     Problem List Items Addressed This Visit     GERD (gastroesophageal reflux disease)    Current Assessment & Plan     Likely etiology of pain         Mild intermittent asthma without complication    Overview     Patient with difficult to control triggers which include reflux with esophogeal dysmotility and allergic rhinitis.  Unfortunately, these triggers are difficult to control and are likely etiology of chronic cough with only mild intermittent asthma.             Current Assessment & Plan     Current symptoms of chest pain are related to costochondritis but will follow up with CXR.  Albuterol provides no relief of symptoms which argues against chronic asthma without wheeze.           AR (allergic rhinitis)    Overview     Needs to be controlled.           Other Visit Diagnoses     Chest pain, unspecified type    -  Primary    Relevant Orders    X-Ray Chest PA And Lateral

## 2020-06-26 ENCOUNTER — TELEPHONE (OUTPATIENT)
Dept: RHEUMATOLOGY | Facility: CLINIC | Age: 60
End: 2020-06-26

## 2020-06-26 ENCOUNTER — TELEPHONE (OUTPATIENT)
Dept: PHARMACY | Facility: CLINIC | Age: 60
End: 2020-06-26

## 2020-06-26 DIAGNOSIS — M06.9 RHEUMATOID ARTHRITIS, INVOLVING UNSPECIFIED SITE, UNSPECIFIED RHEUMATOID FACTOR PRESENCE: Primary | ICD-10-CM

## 2020-06-29 ENCOUNTER — TELEPHONE (OUTPATIENT)
Dept: HEMATOLOGY/ONCOLOGY | Facility: CLINIC | Age: 60
End: 2020-06-29

## 2020-06-29 NOTE — TELEPHONE ENCOUNTER
"----- Message from Ebony Virgen sent at 6/29/2020  3:15 PM CDT -----  Patient Assist    Name of caller: Joyce   Provider name: Chintan HERRING MD  Contact Preference:  [phone]  Is this regarding current patient or new patient?: current   What is the nature of the call?    - time to schedule the Aug f/u and lab appts     Additional Notes:   "Thank you for all that you do for our patients'"          "

## 2020-07-06 ENCOUNTER — TELEPHONE (OUTPATIENT)
Dept: HEMATOLOGY/ONCOLOGY | Facility: CLINIC | Age: 60
End: 2020-07-06

## 2020-07-06 ENCOUNTER — PATIENT OUTREACH (OUTPATIENT)
Dept: ADMINISTRATIVE | Facility: OTHER | Age: 60
End: 2020-07-06

## 2020-07-06 ENCOUNTER — TELEPHONE (OUTPATIENT)
Dept: RHEUMATOLOGY | Facility: CLINIC | Age: 60
End: 2020-07-06

## 2020-07-06 NOTE — TELEPHONE ENCOUNTER
"----- Message from Brigida Lehman sent at 7/6/2020 10:07 AM CDT -----    ----- Message -----  From: Negrita Colon  Sent: 7/6/2020  10:00 AM CDT  To: [facility]  Pool    Returning a Missed Scheduling Call    Contact Preference: [phone]  Patient returning phone call to: Lukasz Lew  Appointment Type: Established  Provider: Dr. Woodson  Does patient feel the need to be seen today? No    Additional Notes:  "Thank you for all that you do for our patients'"         "

## 2020-07-07 ENCOUNTER — TELEPHONE (OUTPATIENT)
Dept: RHEUMATOLOGY | Facility: CLINIC | Age: 60
End: 2020-07-07

## 2020-07-07 ENCOUNTER — PATIENT MESSAGE (OUTPATIENT)
Dept: RHEUMATOLOGY | Facility: CLINIC | Age: 60
End: 2020-07-07

## 2020-07-07 DIAGNOSIS — R74.01 ALT (SGPT) LEVEL RAISED: Primary | ICD-10-CM

## 2020-07-07 NOTE — PROGRESS NOTES
Requested updates within Care Everywhere.  Patient's chart was reviewed for overdue MIKE topics.  Immunizations reconciled.

## 2020-07-08 ENCOUNTER — OFFICE VISIT (OUTPATIENT)
Dept: RHEUMATOLOGY | Facility: CLINIC | Age: 60
End: 2020-07-08
Payer: MEDICARE

## 2020-07-08 ENCOUNTER — CLINICAL SUPPORT (OUTPATIENT)
Dept: SPEECH THERAPY | Facility: HOSPITAL | Age: 60
End: 2020-07-08
Attending: OTOLARYNGOLOGY
Payer: MEDICARE

## 2020-07-08 ENCOUNTER — TELEPHONE (OUTPATIENT)
Dept: OTOLARYNGOLOGY | Facility: CLINIC | Age: 60
End: 2020-07-08

## 2020-07-08 VITALS
BODY MASS INDEX: 26.72 KG/M2 | DIASTOLIC BLOOD PRESSURE: 79 MMHG | HEIGHT: 64 IN | WEIGHT: 156.5 LBS | HEART RATE: 103 BPM | SYSTOLIC BLOOD PRESSURE: 120 MMHG

## 2020-07-08 DIAGNOSIS — M06.9 RHEUMATOID ARTHRITIS INVOLVING MULTIPLE SITES, UNSPECIFIED RHEUMATOID FACTOR PRESENCE: Chronic | ICD-10-CM

## 2020-07-08 DIAGNOSIS — M70.61 GREATER TROCHANTERIC BURSITIS OF RIGHT HIP: ICD-10-CM

## 2020-07-08 DIAGNOSIS — R49.0 DYSPHONIA: Primary | ICD-10-CM

## 2020-07-08 DIAGNOSIS — J38.2 VOCAL CORD NODULE: ICD-10-CM

## 2020-07-08 DIAGNOSIS — M06.9 RHEUMATOID ARTHRITIS, INVOLVING UNSPECIFIED SITE, UNSPECIFIED RHEUMATOID FACTOR PRESENCE: Primary | ICD-10-CM

## 2020-07-08 PROCEDURE — 99214 PR OFFICE/OUTPT VISIT, EST, LEVL IV, 30-39 MIN: ICD-10-PCS | Mod: 25,S$GLB,, | Performed by: INTERNAL MEDICINE

## 2020-07-08 PROCEDURE — 99999 PR PBB SHADOW E&M-EST. PATIENT-LVL V: CPT | Mod: PBBFAC,,, | Performed by: INTERNAL MEDICINE

## 2020-07-08 PROCEDURE — 99214 OFFICE O/P EST MOD 30 MIN: CPT | Mod: 25,S$GLB,, | Performed by: INTERNAL MEDICINE

## 2020-07-08 PROCEDURE — 20610 DRAIN/INJ JOINT/BURSA W/O US: CPT | Mod: RT,S$GLB,, | Performed by: INTERNAL MEDICINE

## 2020-07-08 PROCEDURE — 99999 PR PBB SHADOW E&M-EST. PATIENT-LVL V: ICD-10-PCS | Mod: PBBFAC,,, | Performed by: INTERNAL MEDICINE

## 2020-07-08 PROCEDURE — 20610 LARGE JOINT ASPIRATION/INJECTION: R GREATER TROCHANTERIC BURSA: ICD-10-PCS | Mod: RT,S$GLB,, | Performed by: INTERNAL MEDICINE

## 2020-07-08 PROCEDURE — 92524 BEHAVRAL QUALIT ANALYS VOICE: CPT | Mod: GN

## 2020-07-08 PROCEDURE — 3008F PR BODY MASS INDEX (BMI) DOCUMENTED: ICD-10-PCS | Mod: CPTII,S$GLB,, | Performed by: INTERNAL MEDICINE

## 2020-07-08 PROCEDURE — 3008F BODY MASS INDEX DOCD: CPT | Mod: CPTII,S$GLB,, | Performed by: INTERNAL MEDICINE

## 2020-07-08 RX ORDER — LEFLUNOMIDE 20 MG/1
20 TABLET ORAL DAILY
Qty: 90 TABLET | Refills: 0 | Status: SHIPPED | OUTPATIENT
Start: 2020-07-08 | End: 2020-10-28 | Stop reason: SDUPTHER

## 2020-07-08 RX ORDER — TRIAMCINOLONE ACETONIDE 40 MG/ML
40 INJECTION, SUSPENSION INTRA-ARTICULAR; INTRAMUSCULAR
Status: DISCONTINUED | OUTPATIENT
Start: 2020-07-08 | End: 2020-07-08 | Stop reason: HOSPADM

## 2020-07-08 RX ADMIN — TRIAMCINOLONE ACETONIDE 40 MG: 40 INJECTION, SUSPENSION INTRA-ARTICULAR; INTRAMUSCULAR at 09:07

## 2020-07-08 ASSESSMENT — ROUTINE ASSESSMENT OF PATIENT INDEX DATA (RAPID3)
MDHAQ FUNCTION SCORE: 0.6
TOTAL RAPID3 SCORE: 5
WHEN YOU AWAKENED IN THE MORNING OVER THE LAST WEEK, PLEASE INDICATE THE AMOUNT OF TIME IT TAKES UNTIL YOU ARE AS LIMBER AS YOU WILL BE FOR THE DAY: 24
PSYCHOLOGICAL DISTRESS SCORE: 0
FATIGUE SCORE: 7
PATIENT GLOBAL ASSESSMENT SCORE: 7
AM STIFFNESS SCORE: 1, YES
PAIN SCORE: 6

## 2020-07-08 NOTE — TELEPHONE ENCOUNTER
----- Message from Annabel Perez sent at 7/6/2020 10:16 AM CDT -----  Contact: [phone]/Self  Patient is requesting to speak with nurse regarding voice therapy. Please advise.

## 2020-07-08 NOTE — ASSESSMENT & PLAN NOTE
No active RA at this time, will continue current mgt  She had BL facet joint injections on 3/12/20, hip inject is pending end of this month  Advised to speak to her OB/gyn who manages her UTIs for prophylaxis given her recurrent UTIs  Gluteus medius tendinosis, steroid injection in clinic today    sarilumab 200mg sc q 2 weeks, will miss next dose due to current UTI  Leflunomide 20mg daily  Prednisone 7 mg daily but will decrease back to 6mg  Denosumab 60mg sc q 6 months  Teriparatide 20 mcg sc daily x 2 years  Can consider trial of upadacitinib in future, although tofacitinib was unhelpful.    RTC 3 months with standing labs

## 2020-07-08 NOTE — PROGRESS NOTES
I have personally reviewed the history, confirmed exam findings, and discussed assessment and plan with fellow.      Results for TONNY GOMEZ (MRN [id number]) as of 7/8/2020 09:17   Ref. Range 11/8/2019 08:47   Cholesterol Latest Ref Range: 120 - 199 mg/dL 209 (H)   HDL Latest Ref Range: 40 - 75 mg/dL 96 (H)   Hdl/Cholesterol Ratio Latest Ref Range: 20.0 - 50.0 % 45.9   LDL Cholesterol External Latest Ref Range: 63.0 - 159.0 mg/dL 78.2   Non-HDL Cholesterol Latest Units: mg/dL 113   Total Cholesterol/HDL Ratio Latest Ref Range: 2.0 - 5.0  2.2   Triglycerides Latest Ref Range: 30 - 150 mg/dL 174 (H)     The 10-year ASCVD risk score (Tone DUNBAR Jr., et al., 2013) is: 1.8%    Values used to calculate the score:      Age: 59 years      Sex: Female      Is Non- : No      Diabetic: No      Tobacco smoker: No      Systolic Blood Pressure: 120 mmHg      Is BP treated: No      HDL Cholesterol: 96 mg/dL      Total Cholesterol: 209 mg/dL                      erosive RA discontinued long term methotrexate b/o nausea with po and sc and reduced dose, allergic to sulfa, and had reaction to hydroxychloroquine;   tolerating leflunomide and tolerated tofacitinib but not efficacious  Has failed 3 TNFi(infliximab, etanercept, adalimumab), abatacept, hypogamma with rituximab and gets frequent infections in any event, acute vertigo/dizziness with tocilizumab x2  TJ 4 SJ 0  Pt global 70  ESR 0  CRP 0.1  DAS28 2.1(remission)   CPD32-MXM 3.09(LDA)     CDAI 15(MDA)  Right gluteus medius tendinitis  LBP with multilevel DDD, severe spinal stenosis L4-5  Major problem at present  S/p bilateral facet injections, scheduled for LESI  Osteopenia on denosumab 60mg sc q 6 mo., next due 10/30/20      * After verbal consent and cleansing with Chloraprep the right gr. Trochanteric bursa injected with Kenalog 40mg with 1 ml 1 % lidocaine. Patient tolerated procedure well.  Had allergic reaction to Pneumovax so no booster  Repeat  hepatic function, CK, GGT,  SARS-CoV-2 antibody in 2 wks  Cont sarilumab 200mg q 2 wks  Cont leflunomide 20mg daily  Decrease prednisone 6mg q am  Cont naproxen 500mg twice daily prn with omeprazole 40mg daily  Diclofenac gel 1% prn  Keep reg scheduled appt  If no improvement on sustained sarilumab, trial of upadacitinib but prefer to defer until coronavirus less prevalent  Next denosumab 60mg sc 10/30/20  Cont teriparatide 20 mcg sc daily  F/u Dr. Mayfield for LESI  RTC 3 months with standing labs

## 2020-07-08 NOTE — PATIENT INSTRUCTIONS
UTI prophylaxis to ask her OB/GYN for her recurrent UTIs to help continue taking her Kevzara without interruptions  Continue taking Prednsione 6mg  Continue taking her meds as prescribed

## 2020-07-08 NOTE — PROGRESS NOTES
"Referring provider: Dr. Rachelle Cook*  Reason for visit:  Behavioral and qualitative analysis of voice and resonance (CPT 34156)    Subjective / History    Joyce Peterson is a 59 y.o. female referred for voice evaluation (CPT 55704) by Dr. Ladonna Mcguire. She presents with complaints of hoarseness for the past six months. She reports that she has had hoarseness on and off for years, but this is the longest episode of hoarseness that she has had. She reports that she needs to drop the pitch of her voice or she will fatigue quickly. She also reports pain with speaking, especially toward the end of the day. In addition, she reports that she has allergies and coughs frequently. She lives with her child and grandchild and uses her voice a lot. Of note, she reports that she   uses Symbacort twice per day. She has used it for years and reports that 1-2 years ago she "had yeast on her tongue and in her stomach."    Swallowing: She reports that she had a "swallowing test that showed her muscles don't pull food down like it should." Breathing: asthmatic    Smokin packs/day   Caffeine:Probably 1 liter a day cups/day   Reflux: yes; "serious" reflux, which she reports is due to medication that she is taking. Wakes up with "vomit" in the back of her throat at times and notices her voice is more hoarse when this occurs. She reports that she is implementing lifestyle modifications to assist with reflux (i.e., sleeping on an incline, not eating 2 hours prior to lying down).   Water: 48 oz/day     Laryngoscopy findings (per Dr. Dougherty on 20)  - The false vocal folds and true vocal folds were then examined and were found to have normal mobility (full abduction and adduction) and no masses or mucosal irregularity was seen. Bilateral vocal cord nodules noted.  The arytenoids and the interarytenoid area were normal. The patient tolerated the procedure well without complications.     Past Medical History:   Diagnosis " Date    Acid reflux     Allergy     Anemia     Asthma     Coronary artery disease     Degenerative disc disease     Dry eyes     Dry mouth     Gastroparesis     Hyperlipidemia     Lateral meniscus derangement 4/6/2016    Lobular carcinoma in situ     Osteoarthritis     Osteoporosis     Rheumatoid arthritis(714.0)     Umbilical hernia 8/13/2015     Current Outpatient Medications on File Prior to Visit   Medication Sig Dispense Refill    albuterol (ACCUNEB) 1.25 mg/3 mL Nebu Take 3 mLs (1.25 mg total) by nebulization every 6 (six) hours as needed. Rescue 1 Box 11    albuterol (PROVENTIL HFA) 90 mcg/actuation inhaler Inhale 2 puffs into the lungs every 6 (six) hours as needed. Rescue 20.1 g 11    aspirin 81 mg Tab Take 81 mg by mouth every morning.       azelastine (ASTELIN) 137 mcg nasal spray 1 spray (137 mcg total) by Nasal route 2 (two) times daily. 30 mL 11    benzonatate (TESSALON) 100 MG capsule Take 1 capsule by mouth three times a day 9 capsule 0    budesonide-formoterol 160-4.5 mcg (SYMBICORT) 160-4.5 mcg/actuation HFAA Inhale 2 puffs into the lungs every 12 (twelve) hours. 3 Inhaler 3    calcium citrate-vitamin D (CITRACAL + D) 315-200 mg-unit per tablet Take 1 tablet by mouth 2 (two) times daily.       diazePAM (VALIUM) 5 MG tablet Take 1 tablet (5 mg total) by mouth every 6 (six) hours as needed for Anxiety. 2 tablet 0    diclofenac sodium (VOLTAREN) 1 % Gel APPLY 2 G TOPICALLY 4 (FOUR) TIMES DAILY AS NEEDED. 500 g 5    doxycycline (MONODOX) 50 MG Cap Take 1 capsule (50 mg total) by mouth 2 (two) times daily. 60 capsule 6    fluticasone propionate (FLONASE) 50 mcg/actuation nasal spray 2 sprays (100 mcg total) by Each Nostril route once daily. 9.9 mL 11    GUAIFENESIN (MUCINEX ORAL) Take 400 mg by mouth every 4 (four) hours as needed.       INV PROPULSID 10 MG Take 1 tablets (20 mg) by mouth 4 (four) times daily. FOR INVESTIGATIONAL USE ONLY. Protocol CIS-USA-154 1440 each 0  "   leflunomide (ARAVA) 20 MG Tab Take 1 tablet (20 mg total) by mouth once daily. 90 tablet 0    lifitegrast (XIIDRA) 5 % Dpet Place 1 drop into both eyes 2 (two) times daily. 60 each 12    methylPREDNISolone (MEDROL DOSEPACK) 4 mg tablet use as directed 1 Package 0    montelukast (SINGULAIR) 10 mg tablet Take 1 tablet (10 mg total) by mouth nightly. 90 tablet 3    naproxen (NAPROSYN) 500 MG tablet Take 1 tablet (500 mg total) by mouth 2 (two) times daily as needed. 180 tablet 0    omeprazole (PRILOSEC) 40 MG capsule Take 1 capsule (40 mg total) by mouth every morning. 30 capsule 6    omeprazole-sodium bicarbonate (ZEGERID) 40-1.1 mg-gram per capsule TAKE 1 CAPSULE BY MOUTH EVERY MORNING. 90 capsule 3    POTASSIUM ORAL Take by mouth once daily.      predniSONE (DELTASONE) 1 MG tablet TAKE 1 TABLET BY MOUTH EVERY DAY 90 tablet 1    PREMARIN 0.625 mg tablet Take 0.625 mg by mouth once daily.  4    PREMARIN vaginal cream PLACE 0.5 GRAM VAGINALLY 3 (THREE) TIMES A WEEK. 30 g 1    progesterone (PROMETRIUM) 100 MG capsule Take 100 mg by mouth every morning. 1 Capsule Oral Every day, morning      RESTASIS 0.05 % ophthalmic emulsion PLACE 0.4 MLS (1 DROP TOTAL) INTO BOTH EYES 2 (TWO) TIMES DAILY. 60 vial 5    rizatriptan (MAXALT) 10 MG tablet Take 10 mg by mouth as needed for Migraine.       rosuvastatin (CRESTOR) 20 MG tablet Take 1 tablet (20 mg total) by mouth every evening. 90 tablet 3    sarilumab (KEVZARA) 200 mg/1.14 mL Syrg Inject 1.14 mLs (200 mg total) into the skin every 14 (fourteen) days. 2.28 mL 2    sucralfate (CARAFATE) 1 gram tablet TAKE 1 TABLET (1 G TOTAL) BY MOUTH 4 (FOUR) TIMES DAILY. 360 tablet 3    syringe with needle (TUBERCULIN SYRINGE) 1 mL 27 x 1/2" Syrg To administer methotrexate 7.5mg sc once a week 12 Syringe 3    teriparatide (FORTEO) 20 mcg/dose - 600 mcg/2.4 mL PnIj Inject 0.08 mLs (20 mcg total) into the skin once daily. 2.4 mL 11    traMADol (ULTRAM) 50 mg tablet Take 1 " tablet (50 mg total) by mouth every 24 hours as needed for Pain. 7 tablet 0    valACYclovir (VALTREX) 1000 MG tablet TAKE 1 TABLET BY MOUTH TWICE A DAY AS NEEDED 20 tablet 3    [DISCONTINUED] leflunomide (ARAVA) 20 MG Tab Take 1 tablet (20 mg total) by mouth once daily. 90 tablet 0     Current Facility-Administered Medications on File Prior to Visit   Medication Dose Route Frequency Provider Last Rate Last Dose    0.9%  NaCl infusion   Intravenous Continuous Callum Singer MD   Stopped at 05/21/19 1100    betamethasone acetate-betamethasone sodium phosphate injection 6 mg  6 mg Intra-articular Once Tj Mayfield MD        bupivacaine (PF) 0.25% (2.5 mg/ml) injection 25 mg  10 mL Intramuscular 1 time in Clinic/HOD Tj Mayfield MD        [DISCONTINUED] triamcinolone acetonide injection 40 mg  40 mg Intra-articular  Isiah Moran MD   40 mg at 07/08/20 0900       Objective    Perceptual/behavioral assessment  -CAPE-V Overall Score: 36  -Quality: rough and strained  -Volume: appropriate for age and gender  -Pitch: appropriate for age and gender identity  -Flexibility: diminished  -Habitual respiratory pattern: chest/clavicular    Education / Stimulability Trials   Discussed importance of vocal hygiene including: hydration, conservation, reducing caffeine/drying agents and reducing throat clearing, coughing, other phonotraumatic behaviors. Patient was not stimulable for improved voice using straw phonation SOVT exercises. Patient was stimulable for improvement and decreased roughness with cup to mouth phonation. Good carryover to phrases and structured reading passages. Patient noted an improvement in strain when using cup to mouth phonation and following the use of cup to mouth phonation. Encouraged practicing exercises several times daily in isolation and into short phrases to solidify muscle memory patterns and reduce extralaryngeal tension during speech tasks.  She was amenable to all suggestions.      Functional goals  Length Status Goal   Long term Initiated Patient will implement and adhere to vocal hygiene protocols on a daily basis, including the elimination of phonotraumatic behaviors.    Long term Initiated Patient will re-establish phonation with adequate balance of airflow and resonance with decreased muscle tension.    Long term Initiated Patient will improve coordination of respiration and phonation for efficient vocal production at a conversational level.    Short term Initiated Patient will reduce vocal effort and fatigue by decreasing upper body tension as evidenced by a decrease in symptoms and lack of observable/palpable signs of hyperkinetic muscular behaviors.   Short term Initiated Patient will complete SOVT exercises and/or resonant-focused exercises 3-5x daily to strengthen and balance the intrinsic laryngeal musculature and maximize glottic closure without medial hyperfunction.   Short term Initiated Patient will identify the sensations associated with muscle relaxation in the abdominal, thoracic, neck and facial areas during efficient phonation with minimal clinician cue.    Short term Initiated Patient will increase awareness for voice conservations behaviors by following the 50/10 rule and reduce voice use in competing noise environments.      Assessment     Joyce ePterson presents with mild to moderate dysphonia.  The encounter diagnosis was Vocal cord nodule.  Prognosis for continued improvement is good.     Recommendations / POC    Recommend 3-5 sessions of voice/speech therapy over 4-6 weeks with a speech-language pathologist to optimize glottal postures for improved vocal function, vocal efficiency, and ease of phonation.  She should continue the exercises as discussed in session and should contact me with any further questions.

## 2020-07-08 NOTE — PROGRESS NOTES
"Subjective:       Patient ID: Joyce Peterson is a 59 y.o. female.    Chief Complaint: No chief complaint on file.    HPI   59 year old CF with RA, secondary Sjogren's, Hypogammaglobulinemia, Osteoporosis/osteopenia with elevated FRAX presents for follow up.    Patient is on Sarilumab q 2 weeks after failing multiple medications  Last clinic visit she had acute complaints of low back pain for which a MRI was ordered that showed spinal stenosis at L4-5, She already had injections and next is on July 29th    Patient is doing well in terms of her RA  She does have stiffiness and pain due to her OA  No synovitis or joint swelling  She currently has a UTI for which she is on Cipro and is planning on skipping her next Kevxara dose  She is on prednisone 7mg along with her Kevzara  She still takes her daily forteo with prolia q6 months      Review of Systems   Constitutional: Positive for fatigue. Negative for chills and fever.   Respiratory: Positive for cough (allergies). Negative for shortness of breath.    Cardiovascular: Negative for chest pain.   Gastrointestinal: Negative for abdominal pain.   Genitourinary: Positive for dysuria, frequency and urgency.   Musculoskeletal: Positive for arthralgias, back pain and joint swelling. Negative for myalgias.   Neurological: Negative for dizziness, syncope and headaches.             Objective:   /79   Pulse 103   Ht 5' 3.6" (1.615 m)   Wt 71 kg (156 lb 8 oz)   LMP  (LMP Unknown)   BMI 27.20 kg/m²      Physical Exam   Constitutional: She is oriented to person, place, and time and well-developed, well-nourished, and in no distress. No distress.   HENT:   Head: Normocephalic and atraumatic.   Eyes: Pupils are equal, round, and reactive to light.   Neck: Normal range of motion.   Cardiovascular: Normal rate, regular rhythm and normal heart sounds.    No murmur heard.  Pulmonary/Chest: Effort normal and breath sounds normal. No respiratory distress. She has no wheezes. "       Right Side Rheumatological Exam     Shoulder Exam   Sensation: normal    Knee Exam   Sensation: normal    Hip Exam   Sensation: normal    Elbow/Wrist Exam   Sensation: normal    Muscle Strength (0-5 scale):  Neck Flexion:  5  Neck Extension: 5  Deltoid:  5  Biceps: 5/5   Triceps:  5  : 5/5   Iliopsoas: 5  Quadriceps:  5   Distal Lower Extremity: 5    Left Side Rheumatological Exam     Shoulder Exam   Sensation: normal    Knee Exam   Sensation: normal    Hip Exam   Sensation: normal    Elbow/Wrist Exam   Sensation: normal    Muscle Strength (0-5 scale):  Neck Flexion:  5  Neck Extension: 5  Deltoid:  5  Biceps: 5/5   Triceps:  5  :  5/5   Iliopsoas: 5  Quadriceps:  5   Distal Lower Extremity: 5      Neurological: She is alert and oriented to person, place, and time. Gait normal. GCS score is 15.   Skin: Skin is warm and dry. No rash noted. No erythema.     Psychiatric: Mood, memory, affect and judgment normal.   Musculoskeletal: Normal range of motion. Tenderness present. No deformity or edema.           9/24/2019 1/13/2020 7/8/2020   GURROLA-28 (ESR) -- 3.04 (Low disease activity) 2.1 (Remission)   GURROLA-28 (CRP) -- 3.1 (Low disease activity) 3.09 (Low disease activity)   Tender (GURROLA-28) 8 / 28  6 / 28  4 / 28    Swollen (GURROLA-28) 0 / 28  0 / 28  0 / 28    Provider Global -- 10 mm 40 mm   Patient Global 7 mm 50 mm 70 mm   ESR -- 4 mm/hr 1 mm/hr   CRP -- 0.2 mg/L 0.1 mg/L        Assessment:       1. Rheumatoid arthritis, involving unspecified site, unspecified rheumatoid factor presence    2. Rheumatoid arthritis involving multiple sites, unspecified rheumatoid factor presence            Plan:       Problem List Items Addressed This Visit        Orthopedic    Rheumatoid arthritis involving multiple sites (Chronic)     No active RA at this time, will continue current mgt  She had BL facet joint injections on 3/12/20, hip inject is pending end of this month  Advised to speak to her OB/gyn who manages her UTIs  for prophylaxis given her recurrent UTIs  Gluteus medius tendinosis, steroid injection in clinic today    sarilumab 200mg sc q 2 weeks, will miss next dose due to current UTI  Leflunomide 20mg daily  Prednisone 7 mg daily but will decrease back to 6mg  Denosumab 60mg sc q 6 months  Teriparatide 20 mcg sc daily x 2 years  Can consider trial of upadacitinib in future, although tofacitinib was unhelpful.    RTC 3 months with standing labs           Other Visit Diagnoses     Rheumatoid arthritis, involving unspecified site, unspecified rheumatoid factor presence    -  Primary    Relevant Medications    leflunomide (ARAVA) 20 MG Tab    Other Relevant Orders    COVID-19 (SARS CoV-2) IgG Antibody

## 2020-07-09 ENCOUNTER — OFFICE VISIT (OUTPATIENT)
Dept: CARDIOLOGY | Facility: CLINIC | Age: 60
End: 2020-07-09
Payer: MEDICARE

## 2020-07-09 VITALS
OXYGEN SATURATION: 97 % | HEART RATE: 88 BPM | HEIGHT: 61 IN | BODY MASS INDEX: 29.02 KG/M2 | DIASTOLIC BLOOD PRESSURE: 80 MMHG | SYSTOLIC BLOOD PRESSURE: 120 MMHG | WEIGHT: 153.69 LBS

## 2020-07-09 DIAGNOSIS — R93.1 AGATSTON CORONARY ARTERY CALCIUM SCORE GREATER THAN 400: Primary | ICD-10-CM

## 2020-07-09 DIAGNOSIS — I25.10 CORONARY ARTERY DISEASE INVOLVING NATIVE CORONARY ARTERY OF NATIVE HEART WITHOUT ANGINA PECTORIS: ICD-10-CM

## 2020-07-09 DIAGNOSIS — E78.00 PURE HYPERCHOLESTEROLEMIA: ICD-10-CM

## 2020-07-09 PROCEDURE — 99999 PR PBB SHADOW E&M-EST. PATIENT-LVL V: ICD-10-PCS | Mod: PBBFAC,,, | Performed by: INTERNAL MEDICINE

## 2020-07-09 PROCEDURE — 99214 PR OFFICE/OUTPT VISIT, EST, LEVL IV, 30-39 MIN: ICD-10-PCS | Mod: S$GLB,,, | Performed by: INTERNAL MEDICINE

## 2020-07-09 PROCEDURE — 99999 PR PBB SHADOW E&M-EST. PATIENT-LVL V: CPT | Mod: PBBFAC,,, | Performed by: INTERNAL MEDICINE

## 2020-07-09 PROCEDURE — 3008F PR BODY MASS INDEX (BMI) DOCUMENTED: ICD-10-PCS | Mod: CPTII,S$GLB,, | Performed by: INTERNAL MEDICINE

## 2020-07-09 PROCEDURE — 99214 OFFICE O/P EST MOD 30 MIN: CPT | Mod: S$GLB,,, | Performed by: INTERNAL MEDICINE

## 2020-07-09 PROCEDURE — 3008F BODY MASS INDEX DOCD: CPT | Mod: CPTII,S$GLB,, | Performed by: INTERNAL MEDICINE

## 2020-07-09 NOTE — PROGRESS NOTES
Subjective:    Patient ID:  Joyce Peterson is a 59 y.o. female who presents for follow-up of Coronary Artery Disease      HPI     58 year old female followed by Dr Moran with RA has a CAC of 123 in . CAC was 668 91,Last stress test in  remained negative for ischemia. Her LDL have been well controlled with crestor 20 mg. A recent CT chest reported coronary calcifications. She has been having left lateral chest and parasternal pains that are non exertional. She is non smoker. She had a brother  of a heart attack at 58. She exercises on a stationary bike  3 days a week  Lab Results   Component Value Date     2020    K 3.6 2020     2020    CO2 27 2020    BUN 17 2020    CREATININE 0.69 2020     2020    HGBA1C 6.0 2017    MG 2.0 2019    AST 44 2020    ALT 49 (H) 2020    ALBUMIN 4.0 2020    PROT 6.2 2020    BILITOT 0.6 2020    WBC 4.15 2020    HGB 13.8 2020    HCT 42.4 2020    MCV 95 2020     2020    INR 1.0 2015    TSH 0.349 (L) 2020         Lab Results   Component Value Date    CHOL 209 (H) 2019    CHOL 209 (H) 2019    HDL 96 (H) 2019    HDL 96 (H) 2019    TRIG 174 (H) 2019    TRIG 174 (H) 2019       Lab Results   Component Value Date    LDLCALC 78.2 2019    LDLCALC 78.2 2019       Past Medical History:   Diagnosis Date    Acid reflux     Allergy     Anemia     Asthma     Coronary artery disease     Degenerative disc disease     Dry eyes     Dry mouth     Gastroparesis     Hyperlipidemia     Lateral meniscus derangement 2016    Lobular carcinoma in situ     Osteoarthritis     Osteoporosis     Rheumatoid arthritis(714.0)     Umbilical hernia 2015       Current Outpatient Medications:     albuterol (ACCUNEB) 1.25 mg/3 mL Nebu, Take 3 mLs (1.25 mg total) by nebulization every 6  (six) hours as needed. Rescue, Disp: 1 Box, Rfl: 11    albuterol (PROVENTIL HFA) 90 mcg/actuation inhaler, Inhale 2 puffs into the lungs every 6 (six) hours as needed. Rescue, Disp: 20.1 g, Rfl: 11    aspirin 81 mg Tab, Take 81 mg by mouth every morning. , Disp: , Rfl:     azelastine (ASTELIN) 137 mcg nasal spray, 1 spray (137 mcg total) by Nasal route 2 (two) times daily., Disp: 30 mL, Rfl: 11    benzonatate (TESSALON) 100 MG capsule, Take 1 capsule by mouth three times a day, Disp: 9 capsule, Rfl: 0    budesonide-formoterol 160-4.5 mcg (SYMBICORT) 160-4.5 mcg/actuation HFAA, Inhale 2 puffs into the lungs every 12 (twelve) hours., Disp: 3 Inhaler, Rfl: 3    calcium citrate-vitamin D (CITRACAL + D) 315-200 mg-unit per tablet, Take 1 tablet by mouth 2 (two) times daily. , Disp: , Rfl:     diclofenac sodium (VOLTAREN) 1 % Gel, APPLY 2 G TOPICALLY 4 (FOUR) TIMES DAILY AS NEEDED., Disp: 500 g, Rfl: 5    doxycycline (MONODOX) 50 MG Cap, Take 1 capsule (50 mg total) by mouth 2 (two) times daily., Disp: 60 capsule, Rfl: 6    fluticasone propionate (FLONASE) 50 mcg/actuation nasal spray, 2 sprays (100 mcg total) by Each Nostril route once daily., Disp: 9.9 mL, Rfl: 11    GUAIFENESIN (MUCINEX ORAL), Take 400 mg by mouth every 4 (four) hours as needed. , Disp: , Rfl:     INV PROPULSID 10 MG, Take 1 tablets (20 mg) by mouth 4 (four) times daily. FOR INVESTIGATIONAL USE ONLY. Protocol CIS-USA-154, Disp: 1440 each, Rfl: 0    leflunomide (ARAVA) 20 MG Tab, Take 1 tablet (20 mg total) by mouth once daily., Disp: 90 tablet, Rfl: 0    lifitegrast (XIIDRA) 5 % Dpet, Place 1 drop into both eyes 2 (two) times daily., Disp: 60 each, Rfl: 12    methylPREDNISolone (MEDROL DOSEPACK) 4 mg tablet, use as directed, Disp: 1 Package, Rfl: 0    montelukast (SINGULAIR) 10 mg tablet, Take 1 tablet (10 mg total) by mouth nightly., Disp: 90 tablet, Rfl: 3    naproxen (NAPROSYN) 500 MG tablet, Take 1 tablet (500 mg total) by mouth 2  "(two) times daily as needed., Disp: 180 tablet, Rfl: 0    omeprazole (PRILOSEC) 40 MG capsule, Take 1 capsule (40 mg total) by mouth every morning., Disp: 30 capsule, Rfl: 6    omeprazole-sodium bicarbonate (ZEGERID) 40-1.1 mg-gram per capsule, TAKE 1 CAPSULE BY MOUTH EVERY MORNING., Disp: 90 capsule, Rfl: 3    POTASSIUM ORAL, Take by mouth once daily., Disp: , Rfl:     predniSONE (DELTASONE) 1 MG tablet, TAKE 1 TABLET BY MOUTH EVERY DAY, Disp: 90 tablet, Rfl: 1    PREMARIN 0.625 mg tablet, Take 0.625 mg by mouth once daily., Disp: , Rfl: 4    PREMARIN vaginal cream, PLACE 0.5 GRAM VAGINALLY 3 (THREE) TIMES A WEEK., Disp: 30 g, Rfl: 1    progesterone (PROMETRIUM) 100 MG capsule, Take 100 mg by mouth every morning. 1 Capsule Oral Every day, morning, Disp: , Rfl:     RESTASIS 0.05 % ophthalmic emulsion, PLACE 0.4 MLS (1 DROP TOTAL) INTO BOTH EYES 2 (TWO) TIMES DAILY., Disp: 60 vial, Rfl: 5    rizatriptan (MAXALT) 10 MG tablet, Take 10 mg by mouth as needed for Migraine. , Disp: , Rfl:     rosuvastatin (CRESTOR) 20 MG tablet, Take 1 tablet (20 mg total) by mouth every evening., Disp: 90 tablet, Rfl: 3    sarilumab (KEVZARA) 200 mg/1.14 mL Syrg, Inject 1.14 mLs (200 mg total) into the skin every 14 (fourteen) days., Disp: 2.28 mL, Rfl: 2    sucralfate (CARAFATE) 1 gram tablet, TAKE 1 TABLET (1 G TOTAL) BY MOUTH 4 (FOUR) TIMES DAILY., Disp: 360 tablet, Rfl: 3    syringe with needle (TUBERCULIN SYRINGE) 1 mL 27 x 1/2" Syrg, To administer methotrexate 7.5mg sc once a week, Disp: 12 Syringe, Rfl: 3    teriparatide (FORTEO) 20 mcg/dose - 600 mcg/2.4 mL PnIj, Inject 0.08 mLs (20 mcg total) into the skin once daily., Disp: 2.4 mL, Rfl: 11    traMADol (ULTRAM) 50 mg tablet, Take 1 tablet (50 mg total) by mouth every 24 hours as needed for Pain., Disp: 7 tablet, Rfl: 0    valACYclovir (VALTREX) 1000 MG tablet, TAKE 1 TABLET BY MOUTH TWICE A DAY AS NEEDED, Disp: 20 tablet, Rfl: 3    diazePAM (VALIUM) 5 MG tablet, " Take 1 tablet (5 mg total) by mouth every 6 (six) hours as needed for Anxiety., Disp: 2 tablet, Rfl: 0    Current Facility-Administered Medications:     betamethasone acetate-betamethasone sodium phosphate injection 6 mg, 6 mg, Intra-articular, Once, Tj Mayfield MD    bupivacaine (PF) 0.25% (2.5 mg/ml) injection 25 mg, 10 mL, Intramuscular, 1 time in Clinic/HOD, Tj Mayfield MD    Facility-Administered Medications Ordered in Other Visits:     0.9%  NaCl infusion, , Intravenous, Continuous, Callum Singer MD, Stopped at 05/21/19 1100          Review of Systems   Constitution: Negative for decreased appetite, diaphoresis, fever, malaise/fatigue, weight gain and weight loss.   HENT: Negative for congestion, ear discharge, ear pain and nosebleeds.    Eyes: Negative for blurred vision, double vision and visual disturbance.   Cardiovascular: Positive for chest pain. Negative for claudication, cyanosis, dyspnea on exertion, irregular heartbeat, leg swelling, near-syncope, orthopnea, palpitations, paroxysmal nocturnal dyspnea and syncope.   Respiratory: Negative for cough, hemoptysis, shortness of breath, sleep disturbances due to breathing, snoring, sputum production and wheezing.    Endocrine: Negative for polydipsia, polyphagia and polyuria.   Hematologic/Lymphatic: Negative for adenopathy and bleeding problem. Does not bruise/bleed easily.   Skin: Negative for color change, nail changes, poor wound healing and rash.   Musculoskeletal: Negative for muscle cramps and muscle weakness.   Gastrointestinal: Negative for abdominal pain, anorexia, change in bowel habit, hematochezia, nausea and vomiting.   Genitourinary: Negative for dysuria, frequency and hematuria.   Neurological: Negative for brief paralysis, difficulty with concentration, excessive daytime sleepiness, dizziness, focal weakness, headaches, light-headedness, seizures, vertigo and weakness.   Psychiatric/Behavioral: Negative for altered mental status  "and depression.   Allergic/Immunologic: Negative for persistent infections.        Objective:/80   Pulse 88   Ht 5' 1" (1.549 m)   Wt 69.7 kg (153 lb 10.6 oz)   LMP  (LMP Unknown)   SpO2 97%   BMI 29.03 kg/m²             Physical Exam   Constitutional: She is oriented to person, place, and time. She appears well-developed and well-nourished.   HENT:   Head: Normocephalic.   Right Ear: External ear normal.   Left Ear: External ear normal.   Nose: Nose normal.   Inspection of lips, teeth and gums normal   Eyes: Pupils are equal, round, and reactive to light. Conjunctivae and EOM are normal. No scleral icterus.   Neck: Normal range of motion. No JVD present. No tracheal deviation present. No thyromegaly present.   Cardiovascular: Normal rate, regular rhythm, normal heart sounds and intact distal pulses. Exam reveals no gallop and no friction rub.   No murmur heard.  Pulses:       Carotid pulses are 2+ on the right side and 2+ on the left side.       Dorsalis pedis pulses are 2+ on the right side and 2+ on the left side.        Posterior tibial pulses are 2+ on the right side and 2+ on the left side.   Pulmonary/Chest: Effort normal and breath sounds normal. No respiratory distress. She has no wheezes. She has no rales. She exhibits no tenderness.   Abdominal: Soft. Bowel sounds are normal. She exhibits no distension. There is no hepatosplenomegaly. There is no abdominal tenderness. There is no guarding.   Musculoskeletal: Normal range of motion.         General: No tenderness or edema.   Lymphadenopathy:   Palpation of lymph nodes of neck and groin normal   Neurological: She is oriented to person, place, and time. No cranial nerve deficit. She exhibits normal muscle tone. Coordination normal.   Skin: Skin is warm and dry. No rash noted. No erythema. No pallor.   Palpation of skin normal   Psychiatric: She has a normal mood and affect. Her behavior is normal. Judgment and thought content normal.       "   Assessment:       1. Agatston coronary artery calcium score greater than 400    2. Coronary artery disease involving native coronary artery of native heart without angina pectoris    3. Pure hypercholesterolemia         Plan:       Joyce was seen today for coronary artery disease.    Diagnoses and all orders for this visit:    Agatston coronary artery calcium score greater than 400  -     EKG 12-LEAD - [place]; Future    Coronary artery disease involving native coronary artery of native heart without angina pectoris  -     EKG 12-LEAD - [place]; Future  -     Lipid Panel; Future; Expected date: 07/09/2021  -     Comprehensive metabolic panel; Future; Expected date: 07/10/2020  -     CBC auto differential; Future; Expected date: 07/09/2021    Pure hypercholesterolemia  -     Lipid Panel; Future; Expected date: 07/09/2021

## 2020-07-16 ENCOUNTER — HOSPITAL ENCOUNTER (OUTPATIENT)
Dept: CARDIOLOGY | Facility: HOSPITAL | Age: 60
Discharge: HOME | End: 2020-07-16
Attending: INTERNAL MEDICINE
Payer: MEDICARE

## 2020-07-16 VITALS
WEIGHT: 153 LBS | DIASTOLIC BLOOD PRESSURE: 70 MMHG | RESPIRATION RATE: 16 BRPM | BODY MASS INDEX: 28.89 KG/M2 | SYSTOLIC BLOOD PRESSURE: 118 MMHG | HEIGHT: 61 IN | HEART RATE: 84 BPM

## 2020-07-16 DIAGNOSIS — I25.10 CORONARY ARTERY DISEASE INVOLVING NATIVE CORONARY ARTERY OF NATIVE HEART WITHOUT ANGINA PECTORIS: ICD-10-CM

## 2020-07-16 LAB
ASCENDING AORTA: 2.34 CM
BSA FOR ECHO PROCEDURE: 1.73 M2
CV ECHO LV RWT: 0.38 CM
CV STRESS BASE HR: 84 BPM
DIASTOLIC BLOOD PRESSURE: 80 MMHG
DOP CALC LVOT AREA: 3.6 CM2
DOP CALC LVOT DIAMETER: 2.15 CM
DOP CALC LVOT PEAK VEL: 0.78 M/S
DOP CALC LVOT STROKE VOLUME: 46.88 CM3
DOP CALCLVOT PEAK VEL VTI: 12.92 CM
E WAVE DECELERATION TIME: 100.35 MSEC
E/A RATIO: 0.69
E/E' RATIO: 8.46 M/S
ECHO LV POSTERIOR WALL: 0.8 CM (ref 0.6–1.1)
FRACTIONAL SHORTENING: 35 % (ref 28–44)
INTERVENTRICULAR SEPTUM: 1 CM (ref 0.6–1.1)
LA MAJOR: 3.43 CM
LA MINOR: 3.71 CM
LA WIDTH: 3.14 CM
LEFT ATRIUM SIZE: 3.82 CM
LEFT ATRIUM VOLUME INDEX MOD: 17.8 ML/M2
LEFT ATRIUM VOLUME INDEX: 21.6 ML/M2
LEFT ATRIUM VOLUME MOD: 30 CM3
LEFT ATRIUM VOLUME: 36.34 CM3
LEFT INTERNAL DIMENSION IN SYSTOLE: 2.76 CM (ref 2.1–4)
LEFT VENTRICLE DIASTOLIC VOLUME INDEX: 48.06 ML/M2
LEFT VENTRICLE DIASTOLIC VOLUME: 81.02 ML
LEFT VENTRICLE MASS INDEX: 72 G/M2
LEFT VENTRICLE SYSTOLIC VOLUME INDEX: 16.9 ML/M2
LEFT VENTRICLE SYSTOLIC VOLUME: 28.57 ML
LEFT VENTRICULAR INTERNAL DIMENSION IN DIASTOLE: 4.25 CM (ref 3.5–6)
LEFT VENTRICULAR MASS: 120.97 G
LV LATERAL E/E' RATIO: 6.88 M/S
LV SEPTAL E/E' RATIO: 11 M/S
MV PEAK A VEL: 0.8 M/S
MV PEAK E VEL: 0.55 M/S
MV STENOSIS PRESSURE HALF TIME: 29.1 MS
MV VALVE AREA P 1/2 METHOD: 7.56 CM2
OHS CV CPX 1 MINUTE RECOVERY HEART RATE: 139 BPM
OHS CV CPX 85 PERCENT MAX PREDICTED HEART RATE MALE: 131
OHS CV CPX MAX PREDICTED HEART RATE: 154
OHS CV CPX PATIENT IS FEMALE: 1
OHS CV CPX PATIENT IS MALE: 0
OHS CV CPX PEAK DIASTOLIC BLOOD PRESSURE: 72 MMHG
OHS CV CPX PEAK HEAR RATE: 142 BPM
OHS CV CPX PEAK RATE PRESSURE PRODUCT: ABNORMAL
OHS CV CPX PEAK SYSTOLIC BLOOD PRESSURE: 148 MMHG
OHS CV CPX PERCENT MAX PREDICTED HEART RATE ACHIEVED: 92
OHS CV CPX RATE PRESSURE PRODUCT PRESENTING: ABNORMAL
PISA TR MAX VEL: 2.54 M/S
PULM VEIN S/D RATIO: 1.62
PV PEAK D VEL: 0.39 M/S
PV PEAK S VEL: 0.63 M/S
RA MAJOR: 3.06 CM
RA PRESSURE: 3 MMHG
RA WIDTH: 2.31 CM
RIGHT VENTRICULAR END-DIASTOLIC DIMENSION: 2.82 CM
RV TISSUE DOPPLER FREE WALL SYSTOLIC VELOCITY 1 (APICAL 4 CHAMBER VIEW): 12.39 CM/S
SINUS: 2.95 CM
STJ: 3.28 CM
STRESS ECHO POST EXERCISE DUR MIN: 4 MINUTES
STRESS ECHO POST EXERCISE DUR SEC: 51 SECONDS
SYSTOLIC BLOOD PRESSURE: 125 MMHG
TDI LATERAL: 0.08 M/S
TDI SEPTAL: 0.05 M/S
TDI: 0.07 M/S
TR MAX PG: 26 MMHG
TRICUSPID ANNULAR PLANE SYSTOLIC EXCURSION: 1.67 CM
TV REST PULMONARY ARTERY PRESSURE: 29 MMHG

## 2020-07-16 PROCEDURE — 63600175 PHARM REV CODE 636 W HCPCS: Performed by: INTERNAL MEDICINE

## 2020-07-16 PROCEDURE — 93351 STRESS ECHO (CUPID ONLY): ICD-10-PCS | Mod: 26,,, | Performed by: INTERNAL MEDICINE

## 2020-07-16 PROCEDURE — 93351 STRESS TTE COMPLETE: CPT | Mod: 26,,, | Performed by: INTERNAL MEDICINE

## 2020-07-16 PROCEDURE — 93351 STRESS TTE COMPLETE: CPT

## 2020-07-16 RX ORDER — DOBUTAMINE HYDROCHLORIDE 200 MG/100ML
10 INJECTION INTRAVENOUS
Status: COMPLETED | OUTPATIENT
Start: 2020-07-16 | End: 2020-07-16

## 2020-07-16 RX ADMIN — DOBUTAMINE HYDROCHLORIDE 10 MCG/KG/MIN: 200 INJECTION INTRAVENOUS at 10:07

## 2020-07-20 ENCOUNTER — OFFICE VISIT (OUTPATIENT)
Dept: SPORTS MEDICINE | Facility: CLINIC | Age: 60
End: 2020-07-20
Payer: MEDICARE

## 2020-07-20 ENCOUNTER — HOSPITAL ENCOUNTER (OUTPATIENT)
Dept: RADIOLOGY | Facility: HOSPITAL | Age: 60
Discharge: HOME | End: 2020-07-20
Attending: PHYSICIAN ASSISTANT
Payer: MEDICARE

## 2020-07-20 VITALS
HEART RATE: 101 BPM | HEIGHT: 61 IN | DIASTOLIC BLOOD PRESSURE: 74 MMHG | WEIGHT: 153 LBS | SYSTOLIC BLOOD PRESSURE: 113 MMHG | BODY MASS INDEX: 28.89 KG/M2

## 2020-07-20 DIAGNOSIS — M16.11 OSTEOARTHRITIS OF RIGHT HIP, UNSPECIFIED OSTEOARTHRITIS TYPE: ICD-10-CM

## 2020-07-20 DIAGNOSIS — T38.0X5A STEROID-INDUCED OSTEOPOROSIS: ICD-10-CM

## 2020-07-20 DIAGNOSIS — M25.551 RIGHT HIP PAIN: ICD-10-CM

## 2020-07-20 DIAGNOSIS — M81.8 STEROID-INDUCED OSTEOPOROSIS: ICD-10-CM

## 2020-07-20 DIAGNOSIS — M25.551 PAIN OF RIGHT HIP JOINT: ICD-10-CM

## 2020-07-20 DIAGNOSIS — M25.551 RIGHT HIP PAIN: Primary | ICD-10-CM

## 2020-07-20 PROCEDURE — 3008F PR BODY MASS INDEX (BMI) DOCUMENTED: ICD-10-PCS | Mod: CPTII,S$GLB,, | Performed by: PHYSICIAN ASSISTANT

## 2020-07-20 PROCEDURE — 99214 PR OFFICE/OUTPT VISIT, EST, LEVL IV, 30-39 MIN: ICD-10-PCS | Mod: S$GLB,,, | Performed by: PHYSICIAN ASSISTANT

## 2020-07-20 PROCEDURE — 99999 PR PBB SHADOW E&M-EST. PATIENT-LVL V: CPT | Mod: PBBFAC,,, | Performed by: PHYSICIAN ASSISTANT

## 2020-07-20 PROCEDURE — 99999 PR PBB SHADOW E&M-EST. PATIENT-LVL V: ICD-10-PCS | Mod: PBBFAC,,, | Performed by: PHYSICIAN ASSISTANT

## 2020-07-20 PROCEDURE — 73502 XR HIP 2 VIEW RIGHT: ICD-10-PCS | Mod: 26,RT,, | Performed by: RADIOLOGY

## 2020-07-20 PROCEDURE — 73502 X-RAY EXAM HIP UNI 2-3 VIEWS: CPT | Mod: TC,RT

## 2020-07-20 PROCEDURE — 73502 X-RAY EXAM HIP UNI 2-3 VIEWS: CPT | Mod: 26,RT,, | Performed by: RADIOLOGY

## 2020-07-20 PROCEDURE — 99214 OFFICE O/P EST MOD 30 MIN: CPT | Mod: S$GLB,,, | Performed by: PHYSICIAN ASSISTANT

## 2020-07-20 PROCEDURE — 3008F BODY MASS INDEX DOCD: CPT | Mod: CPTII,S$GLB,, | Performed by: PHYSICIAN ASSISTANT

## 2020-07-20 RX ORDER — DIAZEPAM 5 MG/1
5 TABLET ORAL DAILY PRN
Qty: 2 TABLET | Refills: 0 | Status: SHIPPED | OUTPATIENT
Start: 2020-07-20 | End: 2020-08-13

## 2020-07-22 ENCOUNTER — CLINICAL SUPPORT (OUTPATIENT)
Dept: SPEECH THERAPY | Facility: HOSPITAL | Age: 60
End: 2020-07-22
Attending: PHYSICIAN ASSISTANT
Payer: MEDICARE

## 2020-07-22 ENCOUNTER — HOSPITAL ENCOUNTER (OUTPATIENT)
Dept: RADIOLOGY | Facility: HOSPITAL | Age: 60
Discharge: HOME | End: 2020-07-22
Attending: PHYSICIAN ASSISTANT
Payer: MEDICARE

## 2020-07-22 DIAGNOSIS — J38.2 VOCAL CORD NODULE: ICD-10-CM

## 2020-07-22 DIAGNOSIS — R49.0 DYSPHONIA: Primary | ICD-10-CM

## 2020-07-22 DIAGNOSIS — M25.551 PAIN OF RIGHT HIP JOINT: ICD-10-CM

## 2020-07-22 PROCEDURE — 73721 MRI JNT OF LWR EXTRE W/O DYE: CPT | Mod: 26,RT,, | Performed by: RADIOLOGY

## 2020-07-22 PROCEDURE — 73721 MRI HIP WITHOUT CONTRAST RIGHT: ICD-10-PCS | Mod: 26,RT,, | Performed by: RADIOLOGY

## 2020-07-22 PROCEDURE — 73721 MRI JNT OF LWR EXTRE W/O DYE: CPT | Mod: TC,RT

## 2020-07-22 PROCEDURE — 92507 TX SP LANG VOICE COMM INDIV: CPT | Mod: GN,52

## 2020-07-22 NOTE — PROGRESS NOTES
"Referring provider: Dr. Rachelle Cook*  Reason for visit:  Voice treatment (CPT 62527)  Session #1    History / Subjective   I had the pleasure of seeing Joyce Peterson for her first treatment session following complete voice evaluation on 7/8/20. During that time, improvements were noted on cup to mouth phonation voice exercises. Since evaluation, she reports that her throat isn't as sore and that she is not as hoarse. Her  remarked that he can tell a difference in the quality of her voice. She's done cup to mouth phonation, but isn't sure if she's doing it correctly. She reports "trying not to strain and push air through", and finds this helpful. She reports that she completes cup to mouth phonation 5- 10 x a day.     Objective   The primary goal of todays session was to review treatment plan. This was targeted using cup to mouth phonation.    Perceptual/behavioral assessment  -CAPE-V Overall Score: 28  -Quality: mild roughness, mild strain  -Volume: appropriate for age and gender identity  -Pitch: appropriate for age and gender identity  -Flexibility: diminished  -Habitual respiratory pattern: chest/clavicular    Treatment  Patient was stimulable for improved vocal quality in modal pitch, pitch glides, phrases, and structured reading passages. She reported that she visualized "talking from the top of my mouth instead of my throat" and reported decreased strain when doing so. However, she reported that when she becomes excited, her voice becomes higher- pitched, and she notices increased strain. Clinician cued pt to practice speaking in a higher-pitched voice while using cup to mouth phonation, and patient noted no strain. For home practice, clinician reviewed home exercises as practiced during the session with the patient. Clinician instructed patient to use exercises prior to conversations, particularly lengthy conversations on the phone. In addition, instructed pt to rest voice when feeling " strained.    Functional goals  Length Status Goal   Long term Ongoing Patient will implement and adhere to vocal hygiene protocols on a daily basis, including the elimination of phonotraumatic behaviors.    Long term Ongoing/Met Patient will re-establish phonation with adequate balance of airflow and resonance with decreased muscle tension.    Long term Ongoing/Met Patient will improve coordination of respiration and phonation for efficient vocal production at a conversational level.    Short term Ongoing/Met Patient will reduce vocal effort and fatigue by decreasing upper body tension as evidenced by a decrease in symptoms and lack of observable/palpable signs of hyperkinetic muscular behaviors.   Short term Met Patient will complete SOVT exercises and/or resonant-focused exercises 3-5x daily to strengthen and balance the intrinsic laryngeal musculature and maximize glottic closure without medial hyperfunction.   Short term Met Patient will identify the sensations associated with muscle relaxation in the abdominal, thoracic, neck and facial areas during efficient phonation with minimal clinician cue.    Short term Ongoing/Met Patient will increase awareness for voice conservations behaviors by following the 50/10 rule and reduce voice use in competing noise environments.       Assessment     Joyce Peterson presents with mild to moderate dysphonia.  The encounter diagnosis was Vocal cord nodule.  Prognosis for continued improvement is good.      Recommendations / POC    Recommend 3-5 sessions of voice/speech therapy over 4-6 weeks with a speech-language pathologist to optimize glottal postures for improved vocal function, vocal efficiency, and ease of phonation. She should continue the exercises as discussed in session and should contact me with any further questions.

## 2020-07-22 NOTE — PROGRESS NOTES
CC: right hip pain    HPI:   Joyce Peterson is a pleasant 59 y.o. patient who reports to clinic with right hip pain. No trauma, no mech sxs/instabilty.    PMH of Rheumatoid Arthritis, Immunosuppressed status, CAD, and steroid-induced osteoporosis.      Presents to clinic with 2 months of anterior lateral right hip pain that is gradually worsening despite conservative management. Pain is severe and feels identical to left hip pain which was underlying stress fracture. Standing and laying on the hip makes the pain worse. She describes the pain as a constant ache. Her pain is worse in the evening. She has tried tylenol, rest, and HEP with no pain improvement. She is concerned for possible hip stress fracture especially due to her copious amounts of stress fractures previously and due to her steroid induced osteoporosis.    She rates her pain at a 9/10 today.     She had a previous right trochanteric bursa steroid injection by Dr. Moran on 7/8/20 with 50% same day pain relief but this wore off a few days later.     She occasionally has some tingling from her back to her buttocks and posterior thigh to knee.     Pain is affecting ADLs.      Previous left hip surgery for chronic stress fracture on 5/21/2019. doing great  OPERATIVE NOTE     DOS:               05/21/2019     Preop Dx:        Left intertrochanteric femur fracture     Postop Dx:      Left intertrochanteric femur fracture     Procedure:      Cephalomedullary nail fixation of left intertrochanteric femur fracture - 26155       She has a PMH of Rheumatoid Arthritis see by Dr. Moran.  She is on chronic prednisone treatment currently on 5 mg daily   PMH of 9 stress fractures in the past to various areas.     Medical history notable for osteopenia (on Prolia (denosumab) and citracal+D, last dexa 2/28/19 T-score -2.1 lumbar vertebra, -1.1 left femoral neck) and RA (on Kevzara and prednisone 5 mg)    Review of Systems   Constitution: Negative. Negative for  chills, fever and night sweats.   HENT: Negative for congestion and headaches.    Eyes: Negative for blurred vision, left vision loss and right vision loss.   Cardiovascular: Negative for chest pain and syncope.   Respiratory: Negative for cough and shortness of breath.    Endocrine: Negative for polydipsia, polyphagia and polyuria.   Hematologic/Lymphatic: Negative for bleeding problem. Does not bruise/bleed easily.   Skin: Negative for dry skin, itching and rash.   Musculoskeletal: Negative for falls and muscle weakness.   Gastrointestinal: Negative for abdominal pain and bowel incontinence.   Genitourinary: Negative for bladder incontinence and nocturia.   Neurological: Negative for disturbances in coordination, loss of balance and seizures.   Psychiatric/Behavioral: Negative for depression. The patient does not have insomnia.    Allergic/Immunologic: Negative for hives and persistent infections.   All other systems negative.    PAST MEDICAL HISTORY:   Past Medical History:   Diagnosis Date    Acid reflux     Allergy     Anemia     Asthma     Coronary artery disease     Degenerative disc disease     Dry eyes     Dry mouth     Gastroparesis     Hyperlipidemia     Lateral meniscus derangement 4/6/2016    Lobular carcinoma in situ     Osteoarthritis     Osteoporosis     Rheumatoid arthritis(714.0)     Umbilical hernia 8/13/2015     PAST SURGICAL HISTORY:   Past Surgical History:   Procedure Laterality Date    BREAST BIOPSY Left 01/29/2002    core bx    CARPAL TUNNEL RELEASE Right 05/2017    CHOLECYSTECTOMY  2004    COLONOSCOPY      10/11    COLONOSCOPY N/A 6/29/2017    Procedure: COLONOSCOPY;  Surgeon: López Moore MD;  Location: [address];  Service: Endoscopy;  Laterality: N/A;    INJECTION OF FACET JOINT Bilateral 3/12/2020    Procedure: FACET JOINT INJECTION (LUMBAR BLOCK) BILATERAL L4-5 AND L5-S1 DIRECT REFERRAL;  Surgeon: Tj Mayfield MD;  Location: [facility];  Service:  Pain Management;  Laterality: Bilateral;  NEEDS CONSENT    INTRAMEDULLARY RODDING OF FEMUR Left 5/21/2019    Procedure: INSERTION, INTRAMEDULLARY ARIEL, FEMUR;  Surgeon: López Duff MD;  Location: [facility] OR 2ND FLR;  Service: Orthopedics;  Laterality: Left;    REPAIR OF TRICEPS TENDON Left 10/17/2018    Procedure: REPAIR, TENDON, TRICEPS left elbow;  Surgeon: Staci Yarbrough MD;  Location: [facility] OR 1ST FLR;  Service: Orthopedics;  Laterality: Left;  Anesthesia: General and regional. PRONE, k-wire , hand pan 1 and pan 2, CALL ARTHREX/Tamera notified 10-12     TONSILLECTOMY      TUBAL LIGATION  2003    UPPER GASTROINTESTINAL ENDOSCOPY      10/11    uterine ablation  2003     FAMILY HISTORY:   Family History   Problem Relation Age of Onset    Cancer Mother         Lung Cancer    Emphysema Mother     Heart attack Mother     Cancer Father         Lung Cancer    Osteoarthritis Father     Lung cancer Father     Skin cancer Father     Heart disease Brother     Heart attack Brother     Osteoarthritis Paternal Aunt     Retinal detachment Maternal Aunt     No Known Problems Sister     No Known Problems Maternal Uncle     No Known Problems Paternal Uncle     No Known Problems Maternal Grandmother     No Known Problems Maternal Grandfather     No Known Problems Paternal Grandmother     No Known Problems Paternal Grandfather     Colon cancer Neg Hx     Esophageal cancer Neg Hx     Stomach cancer Neg Hx     Celiac disease Neg Hx     Diabetes Neg Hx     Thyroid disease Neg Hx     Amblyopia Neg Hx     Blindness Neg Hx     Cataracts Neg Hx     Glaucoma Neg Hx     Hypertension Neg Hx     Macular degeneration Neg Hx     Strabismus Neg Hx     Stroke Neg Hx      SOCIAL HISTORY:   Social History     Socioeconomic History    Marital status:      Spouse name: Not on file    Number of children: Not on file    Years of education: Not on file    Highest education level: Not on  file   Occupational History    Not on file   Social Needs    Financial resource strain: Not on file    Food insecurity     Worry: Not on file     Inability: Not on file    Transportation needs     Medical: Not on file     Non-medical: Not on file   Tobacco Use    Smoking status: Never Smoker    Smokeless tobacco: Never Used   Substance and Sexual Activity    Alcohol use: Yes     Comment: occasionally    Drug use: No    Sexual activity: Yes     Partners: Male     Birth control/protection: Surgical   Lifestyle    Physical activity     Days per week: Not on file     Minutes per session: Not on file    Stress: Not on file   Relationships    Social connections     Talks on phone: Not on file     Gets together: Not on file     Attends [employer] service: Not on file     Active member of club or organization: Not on file     Attends meetings of clubs or organizations: Not on file     Relationship status: Not on file   Other Topics Concern    Are you pregnant or think you may be? Not Asked    Breast-feeding Not Asked   Social History Narrative    Not on file       MEDICATIONS:   Current Outpatient Medications:     albuterol (ACCUNEB) 1.25 mg/3 mL Nebu, Take 3 mLs (1.25 mg total) by nebulization every 6 (six) hours as needed. Rescue, Disp: 1 Box, Rfl: 11    albuterol (PROVENTIL HFA) 90 mcg/actuation inhaler, Inhale 2 puffs into the lungs every 6 (six) hours as needed. Rescue, Disp: 20.1 g, Rfl: 11    aspirin 81 mg Tab, Take 81 mg by mouth every morning. , Disp: , Rfl:     azelastine (ASTELIN) 137 mcg nasal spray, 1 spray (137 mcg total) by Nasal route 2 (two) times daily., Disp: 30 mL, Rfl: 11    benzonatate (TESSALON) 100 MG capsule, Take 1 capsule by mouth three times a day, Disp: 9 capsule, Rfl: 0    budesonide-formoterol 160-4.5 mcg (SYMBICORT) 160-4.5 mcg/actuation HFAA, Inhale 2 puffs into the lungs every 12 (twelve) hours., Disp: 3 Inhaler, Rfl: 3    calcium citrate-vitamin D (CITRACAL + D)  315-200 mg-unit per tablet, Take 1 tablet by mouth 2 (two) times daily. , Disp: , Rfl:     diazePAM (VALIUM) 5 MG tablet, Take 1 tablet (5 mg total) by mouth daily as needed (30-60 minutes prior to procedure to help anxiety)., Disp: 2 tablet, Rfl: 0    diclofenac sodium (VOLTAREN) 1 % Gel, APPLY 2 G TOPICALLY 4 (FOUR) TIMES DAILY AS NEEDED., Disp: 500 g, Rfl: 5    doxycycline (MONODOX) 50 MG Cap, Take 1 capsule (50 mg total) by mouth 2 (two) times daily., Disp: 60 capsule, Rfl: 6    fluticasone propionate (FLONASE) 50 mcg/actuation nasal spray, 2 sprays (100 mcg total) by Each Nostril route once daily., Disp: 9.9 mL, Rfl: 11    GUAIFENESIN (MUCINEX ORAL), Take 400 mg by mouth every 4 (four) hours as needed. , Disp: , Rfl:     INV PROPULSID 10 MG, Take 1 tablets (20 mg) by mouth 4 (four) times daily. FOR INVESTIGATIONAL USE ONLY. Protocol CIS-USA-154, Disp: 1440 each, Rfl: 0    leflunomide (ARAVA) 20 MG Tab, Take 1 tablet (20 mg total) by mouth once daily., Disp: 90 tablet, Rfl: 0    lifitegrast (XIIDRA) 5 % Dpet, Place 1 drop into both eyes 2 (two) times daily., Disp: 60 each, Rfl: 12    methylPREDNISolone (MEDROL DOSEPACK) 4 mg tablet, use as directed, Disp: 1 Package, Rfl: 0    montelukast (SINGULAIR) 10 mg tablet, Take 1 tablet (10 mg total) by mouth nightly., Disp: 90 tablet, Rfl: 3    naproxen (NAPROSYN) 500 MG tablet, Take 1 tablet (500 mg total) by mouth 2 (two) times daily as needed., Disp: 180 tablet, Rfl: 0    omeprazole (PRILOSEC) 40 MG capsule, Take 1 capsule (40 mg total) by mouth every morning., Disp: 30 capsule, Rfl: 6    omeprazole-sodium bicarbonate (ZEGERID) 40-1.1 mg-gram per capsule, TAKE 1 CAPSULE BY MOUTH EVERY MORNING., Disp: 90 capsule, Rfl: 3    POTASSIUM ORAL, Take by mouth once daily., Disp: , Rfl:     predniSONE (DELTASONE) 1 MG tablet, TAKE 1 TABLET BY MOUTH EVERY DAY, Disp: 90 tablet, Rfl: 1    PREMARIN 0.625 mg tablet, Take 0.625 mg by mouth once daily., Disp: , Rfl:  "4    PREMARIN vaginal cream, PLACE 0.5 GRAM VAGINALLY 3 (THREE) TIMES A WEEK., Disp: 30 g, Rfl: 1    progesterone (PROMETRIUM) 100 MG capsule, Take 100 mg by mouth every morning. 1 Capsule Oral Every day, morning, Disp: , Rfl:     RESTASIS 0.05 % ophthalmic emulsion, PLACE 0.4 MLS (1 DROP TOTAL) INTO BOTH EYES 2 (TWO) TIMES DAILY., Disp: 60 vial, Rfl: 5    rizatriptan (MAXALT) 10 MG tablet, Take 10 mg by mouth as needed for Migraine. , Disp: , Rfl:     rosuvastatin (CRESTOR) 20 MG tablet, Take 1 tablet (20 mg total) by mouth every evening., Disp: 90 tablet, Rfl: 3    sarilumab (KEVZARA) 200 mg/1.14 mL Syrg, Inject 1.14 mLs (200 mg total) into the skin every 14 (fourteen) days., Disp: 2.28 mL, Rfl: 2    sucralfate (CARAFATE) 1 gram tablet, TAKE 1 TABLET (1 G TOTAL) BY MOUTH 4 (FOUR) TIMES DAILY., Disp: 360 tablet, Rfl: 3    syringe with needle (TUBERCULIN SYRINGE) 1 mL 27 x 1/2" Syrg, To administer methotrexate 7.5mg sc once a week, Disp: 12 Syringe, Rfl: 3    teriparatide (FORTEO) 20 mcg/dose - 600 mcg/2.4 mL PnIj, Inject 0.08 mLs (20 mcg total) into the skin once daily., Disp: 2.4 mL, Rfl: 11    traMADol (ULTRAM) 50 mg tablet, Take 1 tablet (50 mg total) by mouth every 24 hours as needed for Pain., Disp: 7 tablet, Rfl: 0    valACYclovir (VALTREX) 1000 MG tablet, TAKE 1 TABLET BY MOUTH TWICE A DAY AS NEEDED, Disp: 20 tablet, Rfl: 3    Current Facility-Administered Medications:     betamethasone acetate-betamethasone sodium phosphate injection 6 mg, 6 mg, Intra-articular, Once, Tj Mayfield MD    bupivacaine (PF) 0.25% (2.5 mg/ml) injection 25 mg, 10 mL, Intramuscular, 1 time in Clinic/HOD, Tj Mayfield MD    Facility-Administered Medications Ordered in Other Visits:     0.9%  NaCl infusion, , Intravenous, Continuous, Callum Singer MD, Stopped at 05/21/19 1100  ALLERGIES:   Review of patient's allergies indicates:   Allergen Reactions    Actemra [tocilizumab] Other (See Comments)     Severe " "dizziness    Codeine Nausea And Vomiting    Gold au 198 Hives and Rash    Hydroxychloroquine Other (See Comments)     Can't remember the reaction      Iodinated contrast media Other (See Comments)     Other reaction(s): BURNING ALL OVER    Iodine Other (See Comments)     Other reaction(s): BURNING ALL OVER - Iodine dye - Not topical    Sulfa (sulfonamide antibiotics) Other (See Comments)     Can't remember the reaction    Zofran [ondansetron hcl (pf)] Nausea And Vomiting     Pt reports that last time she received zofran she started vomiting again    Methotrexate analogues Nausea Only    Pneumovax 23 [pneumococcal 23-argenis ps vaccine] Other (See Comments)     "sick"       VITAL SIGNS: /74   Pulse 101   Ht 5' 1" (1.549 m)   Wt 69.4 kg (153 lb)   LMP  (LMP Unknown)   BMI 28.91 kg/m²        PHYSICAL EXAM /  HIP  PHYSICAL EXAMINATION  General:  The patient is alert and oriented x 3.  Mood is pleasant.  Observation of ears, eyes and nose reveal no gross abnormalities.  HEENT: NCAT, sclera nonicteric  Lungs: Respirations are equal and unlabored..    Right HIP EXAMINATION     OBSERVATION / INSPECTION  Gait:   Nonantalgic   Alignment:  Neutral   Scars:   None   Muscle atrophy: None   Effusion:  None   Warmth:  None   Discoloration:   None   Leg lengths:   Equal   Pelvis:   Level     TENDERNESS / CREPITUS (T/C):      T / C  Trochanteric bursa    + / -  Piriformis    - / -  SI joint    - / -  Psoas tendon   - / -  Rectus insertion  - / -  Adductor origin  -/ -  Pubic symphysis  - / -  IT band                                   + / -  Gluteus tendons                     - / -    ROM: (* = pain)    Flexion:    120 degrees*  External rotation: 35 degrees  Internal rotation with axial load: 20 degrees*  Internal rotation without axial load: 30 degrees  Abduction:  35 degrees*  Adduction:   20 degrees    SPECIAL TESTS:  Pain w/ forced internal rotation (FADIR): -   Pain w/ forced external rotation (SANDRA): - "   Circumduction test:    -  Stinchfield test:    -  Log roll:      Negative   Snapping hip (internal):   Negative   Step-down test:    +  Trendelenburg test:    Negative  Bridge test     +     EXTREMITY NEURO-VASCULAR EXAMINATION:   Sensation:  Grossly intact to light touch all dermatomal regions.   Motor Function:  Fully intact motor function at hip, knee, foot and ankle    DTRs;  quadriceps and  achilles 2+.  No clonus and downgoing Babinski.    Vascular status:  DP and PT pulses 2+, brisk capillary refill, symmetric.    Skin:  intact, compartments soft.    OTHER FINDINGS:    XRAYS:  2 hip/pelvis views were independently reviewed and interpreted by myself.  No fracture, subluxation. Mild right hip DJD noted.    ASSESSMENT:    1. Right hip pain, acute  2.  Concern for right hip femoral neck stress fracture    PLAN:  1.  MRI right hip  To evaluate for stress fracture. CT missed her hip stress fracture previously  2.  She is currently on Prolia for her osteopenia  3.  Continue current pain management  4. Will call her with results to determine treatment plan.     All questions were answered, pt will contact us for questions or concerns in the interim.

## 2020-07-27 ENCOUNTER — PATIENT MESSAGE (OUTPATIENT)
Dept: SPORTS MEDICINE | Facility: CLINIC | Age: 60
End: 2020-07-27

## 2020-07-27 NOTE — TELEPHONE ENCOUNTER
Spoke to patient today at 8:30 am to discuss the results of her right hip pain which did not show a developing or current stress reaction or fracture to her pelvis or femur.     I recommended PT to continue to strengthen her hips, core, glutes along with dry needling to improve her pain which seems to be originating from her extra-articular hip.     Patient states that she has an appointment schedule with Dr. Mayfield for an intra-articular hip injection. I recommended holding off on this and trying PT and dry needling first to reduce the amount of steroid that she has been receiving which could further weaken her bones.     She would like to still, despite my discussion, move forward with the hip injection. She will keep a pain relief log but I am not confident that this will help her hip pain much. Explained this to her.     F/u with me as needed.

## 2020-07-28 ENCOUNTER — TELEPHONE (OUTPATIENT)
Dept: PHARMACY | Facility: CLINIC | Age: 60
End: 2020-07-28

## 2020-07-28 NOTE — TELEPHONE ENCOUNTER
Refill call for Kevzara and Forteo. Verified to ship on Tuesday 7/28 for delivery on Wednesday 7/29. $65.00 copay (total) at 004. Shipping address confirmed - shipping via [employer].     Jared Charlton, PharmD  Clinical Pharmacist  [facility]  P: [phone]

## 2020-07-30 ENCOUNTER — HOSPITAL ENCOUNTER (OUTPATIENT)
Facility: OTHER | Age: 60
Discharge: HOME | End: 2020-07-30
Attending: ANESTHESIOLOGY | Admitting: ANESTHESIOLOGY
Payer: MEDICARE

## 2020-07-30 VITALS
HEART RATE: 95 BPM | RESPIRATION RATE: 16 BRPM | BODY MASS INDEX: 28.7 KG/M2 | HEIGHT: 61 IN | DIASTOLIC BLOOD PRESSURE: 75 MMHG | OXYGEN SATURATION: 99 % | TEMPERATURE: 99 F | SYSTOLIC BLOOD PRESSURE: 126 MMHG | WEIGHT: 152 LBS

## 2020-07-30 DIAGNOSIS — M16.11 OSTEOARTHRITIS OF RIGHT HIP, UNSPECIFIED OSTEOARTHRITIS TYPE: Primary | ICD-10-CM

## 2020-07-30 DIAGNOSIS — G89.29 CHRONIC PAIN: ICD-10-CM

## 2020-07-30 PROCEDURE — 25000003 PHARM REV CODE 250: Performed by: ANESTHESIOLOGY

## 2020-07-30 PROCEDURE — 63600175 PHARM REV CODE 636 W HCPCS: Performed by: ANESTHESIOLOGY

## 2020-07-30 PROCEDURE — 20610 PR DRAIN/INJECT LARGE JOINT/BURSA: ICD-10-PCS | Mod: RT,,, | Performed by: ANESTHESIOLOGY

## 2020-07-30 PROCEDURE — 77002 NEEDLE LOCALIZATION BY XRAY: CPT | Mod: 26 | Performed by: ANESTHESIOLOGY

## 2020-07-30 PROCEDURE — 77002 PR FLUOROSCOPIC GUIDANCE NEEDLE PLACEMENT: ICD-10-PCS | Mod: 26,,, | Performed by: ANESTHESIOLOGY

## 2020-07-30 PROCEDURE — 25500020 PHARM REV CODE 255: Performed by: ANESTHESIOLOGY

## 2020-07-30 PROCEDURE — 20610 DRAIN/INJ JOINT/BURSA W/O US: CPT | Mod: RT,,, | Performed by: ANESTHESIOLOGY

## 2020-07-30 PROCEDURE — 20610 DRAIN/INJ JOINT/BURSA W/O US: CPT | Mod: RT | Performed by: ANESTHESIOLOGY

## 2020-07-30 PROCEDURE — 77002 NEEDLE LOCALIZATION BY XRAY: CPT | Mod: 26,,, | Performed by: ANESTHESIOLOGY

## 2020-07-30 RX ORDER — TRIAMCINOLONE ACETONIDE 40 MG/ML
INJECTION, SUSPENSION INTRA-ARTICULAR; INTRAMUSCULAR
Status: DISCONTINUED | OUTPATIENT
Start: 2020-07-30 | End: 2020-07-30 | Stop reason: HOSPADM

## 2020-07-30 RX ORDER — BUPIVACAINE HYDROCHLORIDE 2.5 MG/ML
INJECTION, SOLUTION EPIDURAL; INFILTRATION; INTRACAUDAL
Status: DISCONTINUED | OUTPATIENT
Start: 2020-07-30 | End: 2020-07-30 | Stop reason: HOSPADM

## 2020-07-30 RX ORDER — DIPHENHYDRAMINE HYDROCHLORIDE 50 MG/ML
25 INJECTION INTRAMUSCULAR; INTRAVENOUS ONCE
Status: COMPLETED | OUTPATIENT
Start: 2020-07-30 | End: 2020-07-30

## 2020-07-30 RX ORDER — ALPRAZOLAM 0.5 MG/1
1 TABLET ORAL ONCE
Status: COMPLETED | OUTPATIENT
Start: 2020-07-30 | End: 2020-07-30

## 2020-07-30 RX ORDER — LIDOCAINE HYDROCHLORIDE 10 MG/ML
INJECTION INFILTRATION; PERINEURAL
Status: DISCONTINUED | OUTPATIENT
Start: 2020-07-30 | End: 2020-07-30 | Stop reason: HOSPADM

## 2020-07-30 RX ORDER — SODIUM CHLORIDE 9 MG/ML
500 INJECTION, SOLUTION INTRAVENOUS CONTINUOUS
Status: DISCONTINUED | OUTPATIENT
Start: 2020-07-30 | End: 2020-07-30 | Stop reason: HOSPADM

## 2020-07-30 RX ADMIN — DIPHENHYDRAMINE HYDROCHLORIDE 25 MG: 50 INJECTION, SOLUTION INTRAMUSCULAR; INTRAVENOUS at 11:07

## 2020-07-30 RX ADMIN — ALPRAZOLAM 1 MG: 0.5 TABLET ORAL at 11:07

## 2020-07-30 NOTE — DISCHARGE INSTRUCTIONS
Thank you for allowing us to care for you today. You may receive a survey about the care we provided. Your feedback is valuable and helps us provide excellent care throughout the community.     Home Care Instructions for Pain Management:    1. DIET:   You may resume your normal diet today.   2. BATHING:   You may shower with luke warm water. No tub baths or anything that will soak injection sites under water for the next 24 hours.  3. DRESSING:   You may remove your bandage today.   4. ACTIVITY LEVEL:   You may resume your normal activities 24 hrs after your procedure. Nothing strenuous today.  5. MEDICATIONS:   You may resume your normal medications today. To restart blood thinners, ask your doctor.  6. DRIVING    If you have received any sedatives by mouth today, you may not drive for 12 hours.    If you have received any sedation through your IV, you may not drive for 24 hrs.   7. SPECIAL INSTRUCTIONS:   No heat to the injection site for 24 hrs including, hot bath or shower, heating pad, moist heat, or hot tubs.    Use ice pack to injection site for any pain or discomfort.  Apply ice packs for 20 minute intervals as needed.    IF you have diabetes, be sure to monitor your blood sugar more closely. IF your injection contained steroids your blood sugar levels may become higher than normal.    If you are still having pain upon discharge:  Your pain may improve over the next 48 hours. The anesthetic (numbing medication) works immediately to 48 hours. IF your injection contained a steroid (anti-inflammatory medication), it takes approximately 3 days to start feeling relief and 7-10 days to see your greatest results from the medication. It is possible you may need subsequent injections. This would be discussed at your follow up appointment with pain management or your referring doctor.    Please call the PAIN MANAGEMENT office at [phone] or ON CALL pager at [phone] if you experienced any:   -Weakness or  loss of sensation  -Fever > 101.5  -Pain uncontrolled with oral medications   -Persistent nausea, vomiting, or diarrhea  -Redness or drainage from the injection sites, or any other worrisome concerns.   If physician on call was not reached or could not communicate with our office for any reason please go to the nearest emergency department.  Adult Procedural Sedation Instructions    Recovery After Procedural Sedation (Adult)  You have been given medicine by vein to make you sleep during your surgery. This may have included both a pain medicine and sleeping medicine. Most of the effects have worn off. But you may still have some drowsiness for the next 6 to 8 hours.  Home care  Follow these guidelines when you get home:  · For the next 8 hours, you should be watched by a responsible adult. This person should make sure your condition is not getting worse.  · Don't drink any alcohol for the next 24 hours.  · Don't drive, operate dangerous machinery, or make important business or personal decisions during the next 24 hours.  Note: Your healthcare provider may tell you not to take any medicine by mouth for pain or sleep in the next 4 hours. These medicines may react with the medicines you were given in the hospital. This could cause a much stronger response than usual.  Follow-up care  Follow up with your healthcare provider if you are not alert and back to your usual level of activity within 12 hours.  When to seek medical advice  Call your healthcare provider right away if any of these occur:  · Drowsiness gets worse  · Weakness or dizziness gets worse  · Repeated vomiting  · You can't be awakened   Date Last Reviewed: 10/18/2016  © 1446-0932 [email or web address]. [address] [place] [address], [place] [address]. All rights reserved. This information is not intended as a substitute for professional medical care. Always follow your healthcare professional's instructions.

## 2020-07-30 NOTE — H&P
HPI  Patient presenting for Procedure(s) (LRB):  INJECTION, JOINT, RIGHT HIP Interarticular under flouro (Right)     Patient on Anti-coagulation Yes, Aspirin 81mg    No health changes since previous encounter    Past Medical History:   Diagnosis Date    Acid reflux     Allergy     Anemia     Asthma     Coronary artery disease     Degenerative disc disease     Dry eyes     Dry mouth     Gastroparesis     Hyperlipidemia     Lateral meniscus derangement 4/6/2016    Lobular carcinoma in situ     Osteoarthritis     Osteoporosis     Rheumatoid arthritis(714.0)     Umbilical hernia 8/13/2015     Past Surgical History:   Procedure Laterality Date    BREAST BIOPSY Left 01/29/2002    core bx    CARPAL TUNNEL RELEASE Right 05/2017    CHOLECYSTECTOMY  2004    COLONOSCOPY      10/11    COLONOSCOPY N/A 6/29/2017    Procedure: COLONOSCOPY;  Surgeon: López Moore MD;  Location: [facility] ENDO (4TH FLR);  Service: Endoscopy;  Laterality: N/A;    INJECTION OF FACET JOINT Bilateral 3/12/2020    Procedure: FACET JOINT INJECTION (LUMBAR BLOCK) BILATERAL L4-5 AND L5-S1 DIRECT REFERRAL;  Surgeon: Tj Mayfield MD;  Location: [facility] PAIN MGT;  Service: Pain Management;  Laterality: Bilateral;  NEEDS CONSENT    INTRAMEDULLARY RODDING OF FEMUR Left 5/21/2019    Procedure: INSERTION, INTRAMEDULLARY ARIEL, FEMUR;  Surgeon: López Duff MD;  Location: [facility] OR 2ND FLR;  Service: Orthopedics;  Laterality: Left;    REPAIR OF TRICEPS TENDON Left 10/17/2018    Procedure: REPAIR, TENDON, TRICEPS left elbow;  Surgeon: Staci Yarbrough MD;  Location: [facility] OR 1ST FLR;  Service: Orthopedics;  Laterality: Left;  Anesthesia: General and regional. PRONE, k-wire , hand pan 1 and pan 2, CALL ARTHREX/Tamera notified 10-12     TONSILLECTOMY      TUBAL LIGATION  2003    UPPER GASTROINTESTINAL ENDOSCOPY      10/11    uterine ablation  2003     Review of patient's allergies indicates:   Allergen Reactions    Actemra  "[tocilizumab] Other (See Comments)     Severe dizziness    Codeine Nausea And Vomiting    Gold au 198 Hives and Rash    Hydroxychloroquine Other (See Comments)     Can't remember the reaction      Iodinated contrast media Other (See Comments)     Other reaction(s): BURNING ALL OVER    Iodine Other (See Comments)     Other reaction(s): BURNING ALL OVER - Iodine dye - Not topical    Sulfa (sulfonamide antibiotics) Other (See Comments)     Can't remember the reaction    Zofran [ondansetron hcl (pf)] Nausea And Vomiting     Pt reports that last time she received zofran she started vomiting again    Methotrexate analogues Nausea Only    Pneumovax 23 [pneumococcal 23-argenis ps vaccine] Other (See Comments)     "sick"      Current Facility-Administered Medications   Medication    0.9%  NaCl infusion     Facility-Administered Medications Ordered in Other Encounters   Medication    0.9%  NaCl infusion       PMHx, PSHx, Allergies, Medications reviewed in epic    ROS negative except pain complaints in HPI    OBJECTIVE:    BP (!) 140/84 (BP Location: Right arm, Patient Position: Sitting)   Pulse 105   Temp 98.9 °F (37.2 °C) (Oral)   Resp 18   Ht 5' 1" (1.549 m)   Wt 68.9 kg (152 lb)   LMP  (LMP Unknown)   SpO2 95%   BMI 28.72 kg/m²     PHYSICAL EXAMINATION:    GENERAL: Well appearing, in no acute distress, alert and oriented x3.  PSYCH:  Mood and affect appropriate.  SKIN: Skin color, texture, turgor normal, no rashes or lesions which will impact the procedure.  CV: RRR with palpation of the radial artery.  PULM: No evidence of respiratory difficulty, symmetric chest rise. Clear to auscultation.  NEURO: Cranial nerves grossly intact.    Plan:    Proceed with procedure as planned Procedure(s) (LRB):  INJECTION, JOINT, RIGHT HIP Interarticular under flouro (Right)    Raudel Colon  07/30/2020            "

## 2020-07-30 NOTE — DISCHARGE SUMMARY
Discharge Note  Short Stay      SUMMARY     Admit Date: 7/30/2020    Attending Physician: Tj Mayfield      Discharge Physician: Tj Mayfield      Discharge Date: 7/30/2020 12:06 PM    Procedure(s) (LRB):  INJECTION, JOINT, RIGHT HIP Interarticular under flouro (Right)    Final Diagnosis: Right hip pain [M25.551]    Disposition: Home or self care    Patient Instructions:   Current Discharge Medication List      CONTINUE these medications which have NOT CHANGED    Details   albuterol (ACCUNEB) 1.25 mg/3 mL Nebu Take 3 mLs (1.25 mg total) by nebulization every 6 (six) hours as needed. Rescue  Qty: 1 Box, Refills: 11      albuterol (PROVENTIL HFA) 90 mcg/actuation inhaler Inhale 2 puffs into the lungs every 6 (six) hours as needed. Rescue  Qty: 20.1 g, Refills: 11      aspirin 81 mg Tab Take 81 mg by mouth every morning.       azelastine (ASTELIN) 137 mcg nasal spray 1 spray (137 mcg total) by Nasal route 2 (two) times daily.  Qty: 30 mL, Refills: 11    Associated Diagnoses: Nasal congestion      benzonatate (TESSALON) 100 MG capsule Take 1 capsule by mouth three times a day  Qty: 9 capsule, Refills: 0    Comments: Rx Discount Card: [facility] CARD- GROUP: [id number], BIN: [id number], ID: [id number] RxVoucher#[id number]      budesonide-formoterol 160-4.5 mcg (SYMBICORT) 160-4.5 mcg/actuation HFAA Inhale 2 puffs into the lungs every 12 (twelve) hours.  Qty: 3 Inhaler, Refills: 3    Associated Diagnoses: Chronic asthma, mild intermittent, uncomplicated      calcium citrate-vitamin D (CITRACAL + D) 315-200 mg-unit per tablet Take 1 tablet by mouth 2 (two) times daily.       diazePAM (VALIUM) 5 MG tablet Take 1 tablet (5 mg total) by mouth daily as needed (30-60 minutes prior to procedure to help anxiety).  Qty: 2 tablet, Refills: 0    Associated Diagnoses: Pain of right hip joint      diclofenac sodium (VOLTAREN) 1 % Gel APPLY 2 G TOPICALLY 4 (FOUR) TIMES DAILY AS NEEDED.  Qty: 500 g, Refills: 5    Associated Diagnoses: Rheumatoid  arthritis involving multiple sites, unspecified rheumatoid factor presence; Bunion; Tailor's bunion of both feet      doxycycline (MONODOX) 50 MG Cap Take 1 capsule (50 mg total) by mouth 2 (two) times daily.  Qty: 60 capsule, Refills: 6      fluticasone propionate (FLONASE) 50 mcg/actuation nasal spray 2 sprays (100 mcg total) by Each Nostril route once daily.  Qty: 9.9 mL, Refills: 11      GUAIFENESIN (MUCINEX ORAL) Take 400 mg by mouth every 4 (four) hours as needed.       INV PROPULSID 10 MG Take 1 tablets (20 mg) by mouth 4 (four) times daily. FOR INVESTIGATIONAL USE ONLY. Protocol [id number]  Qty: 1440 each, Refills: 0    Associated Diagnoses: Patient in clinical research study; Gastroparesis      leflunomide (ARAVA) 20 MG Tab Take 1 tablet (20 mg total) by mouth once daily.  Qty: 90 tablet, Refills: 0    Associated Diagnoses: Rheumatoid arthritis, involving unspecified site, unspecified rheumatoid factor presence      lifitegrast (XIIDRA) 5 % Dpet Place 1 drop into both eyes 2 (two) times daily.  Qty: 60 each, Refills: 12      methylPREDNISolone (MEDROL DOSEPACK) 4 mg tablet use as directed  Qty: 1 Package, Refills: 0      montelukast (SINGULAIR) 10 mg tablet Take 1 tablet (10 mg total) by mouth nightly.  Qty: 90 tablet, Refills: 3    Associated Diagnoses: Perennial allergic rhinitis      naproxen (NAPROSYN) 500 MG tablet Take 1 tablet (500 mg total) by mouth 2 (two) times daily as needed.  Qty: 180 tablet, Refills: 0      omeprazole (PRILOSEC) 40 MG capsule Take 1 capsule (40 mg total) by mouth every morning.  Qty: 30 capsule, Refills: 6      omeprazole-sodium bicarbonate (ZEGERID) 40-1.1 mg-gram per capsule TAKE 1 CAPSULE BY MOUTH EVERY MORNING.  Qty: 90 capsule, Refills: 3      POTASSIUM ORAL Take by mouth once daily.      predniSONE (DELTASONE) 1 MG tablet TAKE 1 TABLET BY MOUTH EVERY DAY  Qty: 90 tablet, Refills: 1      PREMARIN 0.625 mg tablet Take 0.625 mg by mouth once daily.  Refills: 4     "  PREMARIN vaginal cream PLACE 0.5 GRAM VAGINALLY 3 (THREE) TIMES A WEEK.  Qty: 30 g, Refills: 1    Associated Diagnoses: Vaginal atrophy; Recurrent UTI      progesterone (PROMETRIUM) 100 MG capsule Take 100 mg by mouth every morning. 1 Capsule Oral Every day, morning      RESTASIS 0.05 % ophthalmic emulsion PLACE 0.4 MLS (1 DROP TOTAL) INTO BOTH EYES 2 (TWO) TIMES DAILY.  Qty: 60 vial, Refills: 5    Associated Diagnoses: Bilateral dry eyes      rizatriptan (MAXALT) 10 MG tablet Take 10 mg by mouth as needed for Migraine.       rosuvastatin (CRESTOR) 20 MG tablet Take 1 tablet (20 mg total) by mouth every evening.  Qty: 90 tablet, Refills: 3    Associated Diagnoses: Coronary artery disease involving native coronary artery of native heart without angina pectoris      sarilumab (KEVZARA) 200 mg/1.14 mL Syrg Inject 1.14 mLs (200 mg total) into the skin every 14 (fourteen) days.  Qty: 2.28 mL, Refills: 2      sucralfate (CARAFATE) 1 gram tablet TAKE 1 TABLET (1 G TOTAL) BY MOUTH 4 (FOUR) TIMES DAILY.  Qty: 360 tablet, Refills: 3    Associated Diagnoses: Gastroparesis      syringe with needle (TUBERCULIN SYRINGE) 1 mL 27 x 1/2" Syrg To administer methotrexate 7.5mg sc once a week  Qty: 12 Syringe, Refills: 3    Associated Diagnoses: Rheumatoid arthritis involving multiple sites, unspecified rheumatoid factor presence      teriparatide (FORTEO) 20 mcg/dose - 600 mcg/2.4 mL PnIj Inject 0.08 mLs (20 mcg total) into the skin once daily.  Qty: 2.4 mL, Refills: 11    Associated Diagnoses: Post-menopausal osteoporosis; Steroid-induced osteoporosis; Stress fracture of neck of left femur      traMADol (ULTRAM) 50 mg tablet Take 1 tablet (50 mg total) by mouth every 24 hours as needed for Pain.  Qty: 7 tablet, Refills: 0    Comments: Quantity prescribed more than 7 day supply? No      valACYclovir (VALTREX) 1000 MG tablet TAKE 1 TABLET BY MOUTH TWICE A DAY AS NEEDED  Qty: 20 tablet, Refills: 3    Associated Diagnoses: Recurrent " oral herpes simplex                 Discharge Diagnosis: Right hip pain [M25.551]  Condition on Discharge: Stable with no complications to procedure   Diet on Discharge: Same as before.  Activity: as per instruction sheet.  Discharge to: Home with a responsible adult.  Follow up: 2-4 weeks       Please call my office or pager at [phone] if experienced any weakness or loss of sensation, fever > 101.5, pain uncontrolled with oral medications, persistent nausea/vomiting/or diarrhea, redness or drainage from the incisions, or any other worrisome concerns. If physician on call was not reached or could not communicate with our office for any reason please go to the nearest emergency department

## 2020-07-30 NOTE — OP NOTE
27- Hip Injection/Arthrogram  ANTERIOR APPROACH  Time-out taken to identify patient and procedure side prior to starting the procedure.   I attest that I have reviewed the patient's home medications prior to the procedure and no contraindication have been identified. I  re-evaluated the patient after the patient was positioned for the procedure in the procedure room immediately before the procedural time-out. The vital signs are current and represent the current state of the patient which has not significantly changed since the preprocedure assessment.                                                                                                                         Date of Service: 07/30/2020    PCP: Becca Peters DO    Referring Physician:                                                                                             PROCEDURE:  Right hip joint injection under fluoroscopy.    REASON FOR PROCEDURE: Right hip pain [M25.551]     1. Osteoarthritis of right hip, unspecified osteoarthritis type    2. Chronic pain      POSTOP DIAGNOSIS: Right hip pain [M25.551]  1. Osteoarthritis of right hip, unspecified osteoarthritis type    2. Chronic pain        PHYSICIAN: Tj Mayfield MD  ASSISTANTS: Raudel Colon MD Resident                                                                                                 ANESTHESIA:  Xylocaine 1% 9ml with Sodium Bicarbonate 1ml. 3ml per site.                                                                                                              MEDICATIONS INJECTED:  Kenalog 20mg, bupivacaine 0.25% 2 mL (per side), and sterile saline 2ml (per side)                                                                                                                                     ESTIMATED BLOOD LOSS:  None.                                                                                                                              COMPLICATIONS:  None.      SEDATION: None                                                                                                                                    TECHNIQUE:  With the patient lying in the supine position the the femoral neck identified under fluoroscopy.  The area was prepped and draped in the usual       sterile fashion.  Local anesthetic was used, given by raising a wheal and    going down to the hub of the 27-gauge needle.  A 3-1/2 inch 22-gauge         needle was introduced under fluoroscopy until the tip reached the lateral aspect of the femoral neck.  When the tip of the needle was thought   to be in appropriate position, contrast was injected to confirm proper placement.  Medication was then injected slowly.  The patient tolerated the procedure well.                                                                                                                     PAIN BEFORE THE PROCEDURE: 4-8/10.                                                                                                                         PAIN AFTER THE PROCEDURE:  1/10.                                                                                                                          The patient was monitored for 20-30 minutes after the procedure and was      given post procedure and discharge instructions to follow at home.  The      patient is to follow-up with me in two weeks by calling.   The patient was discharged in a stable condtion.

## 2020-08-03 DIAGNOSIS — I25.10 CORONARY ARTERY DISEASE INVOLVING NATIVE CORONARY ARTERY OF NATIVE HEART WITHOUT ANGINA PECTORIS: Chronic | ICD-10-CM

## 2020-08-03 RX ORDER — ROSUVASTATIN CALCIUM 20 MG/1
20 TABLET, COATED ORAL NIGHTLY
Qty: 90 TABLET | Refills: 3 | Status: SHIPPED | OUTPATIENT
Start: 2020-08-03 | End: 2021-07-21

## 2020-08-05 DIAGNOSIS — R39.15 URINARY URGENCY: ICD-10-CM

## 2020-08-05 DIAGNOSIS — R35.0 INCREASED FREQUENCY OF URINATION: ICD-10-CM

## 2020-08-05 PROBLEM — R26.2 DIFFICULTY WALKING: Status: RESOLVED | Noted: 2019-12-05 | Resolved: 2020-08-05

## 2020-08-05 PROBLEM — M79.18 BUTTOCK PAIN: Status: RESOLVED | Noted: 2019-09-27 | Resolved: 2020-08-05

## 2020-08-05 RX ORDER — TROSPIUM CHLORIDE 20 MG/1
TABLET, FILM COATED ORAL
Qty: 180 TABLET | Refills: 0 | Status: SHIPPED | OUTPATIENT
Start: 2020-08-05 | End: 2020-10-29

## 2020-08-05 NOTE — TELEPHONE ENCOUNTER
Patient was last seen in Sept 2019.  Refill request was done for 3 month refill with a note that she needs an appointment.

## 2020-08-06 ENCOUNTER — TELEPHONE (OUTPATIENT)
Dept: OTOLARYNGOLOGY | Facility: CLINIC | Age: 60
End: 2020-08-06

## 2020-08-10 RX ORDER — OMEPRAZOLE 40 MG/1
40 CAPSULE, DELAYED RELEASE ORAL EVERY MORNING
Qty: 30 CAPSULE | Refills: 6 | Status: SHIPPED | OUTPATIENT
Start: 2020-08-10 | End: 2021-08-11 | Stop reason: SDUPTHER

## 2020-08-12 ENCOUNTER — OFFICE VISIT (OUTPATIENT)
Dept: OPTOMETRY | Facility: CLINIC | Age: 60
End: 2020-08-12
Payer: MEDICARE

## 2020-08-12 ENCOUNTER — TELEPHONE (OUTPATIENT)
Dept: PAIN MEDICINE | Facility: CLINIC | Age: 60
End: 2020-08-12

## 2020-08-12 DIAGNOSIS — H04.123 BILATERAL DRY EYES: ICD-10-CM

## 2020-08-12 DIAGNOSIS — L30.9 DERMATITIS: Primary | ICD-10-CM

## 2020-08-12 DIAGNOSIS — H57.89 EYE IRRITATION: ICD-10-CM

## 2020-08-12 PROCEDURE — 92012 PR EYE EXAM, EST PATIENT,INTERMED: ICD-10-PCS | Mod: S$GLB,,, | Performed by: OPTOMETRIST

## 2020-08-12 PROCEDURE — 92012 INTRM OPH EXAM EST PATIENT: CPT | Mod: S$GLB,,, | Performed by: OPTOMETRIST

## 2020-08-12 PROCEDURE — 99999 PR PBB SHADOW E&M-EST. PATIENT-LVL II: ICD-10-PCS | Mod: PBBFAC,,, | Performed by: OPTOMETRIST

## 2020-08-12 PROCEDURE — 99999 PR PBB SHADOW E&M-EST. PATIENT-LVL II: CPT | Mod: PBBFAC,,, | Performed by: OPTOMETRIST

## 2020-08-12 RX ORDER — ERYTHROMYCIN 5 MG/G
OINTMENT OPHTHALMIC
Qty: 3.5 G | Refills: 2 | Status: SHIPPED | OUTPATIENT
Start: 2020-08-12 | End: 2021-01-27

## 2020-08-12 RX ORDER — FLUCONAZOLE 100 MG/1
TABLET ORAL
COMMUNITY
Start: 2020-08-10 | End: 2020-12-14 | Stop reason: SDUPTHER

## 2020-08-12 RX ORDER — CIPROFLOXACIN 500 MG/1
500 TABLET ORAL 2 TIMES DAILY
COMMUNITY
Start: 2020-07-06 | End: 2020-08-13

## 2020-08-12 RX ORDER — PREDNISONE 5 MG/1
5 TABLET ORAL DAILY
COMMUNITY
Start: 2020-06-07 | End: 2020-09-10 | Stop reason: SDUPTHER

## 2020-08-12 RX ORDER — NITROFURANTOIN 25; 75 MG/1; MG/1
100 CAPSULE ORAL 2 TIMES DAILY
COMMUNITY
Start: 2020-07-03 | End: 2020-08-13

## 2020-08-12 NOTE — TELEPHONE ENCOUNTER
Staff called to confirm 08/13/20 8:20 am virtual video visit with Valarie Law NP. Patient informed of instructions.    Patient confirmed and verbalized understanding and expressed thanks.

## 2020-08-13 ENCOUNTER — OFFICE VISIT (OUTPATIENT)
Dept: PAIN MEDICINE | Facility: CLINIC | Age: 60
End: 2020-08-13
Payer: MEDICARE

## 2020-08-13 DIAGNOSIS — M54.17 LUMBOSACRAL RADICULOPATHY: Primary | ICD-10-CM

## 2020-08-13 DIAGNOSIS — M16.11 PRIMARY OSTEOARTHRITIS OF RIGHT HIP: ICD-10-CM

## 2020-08-13 DIAGNOSIS — M16.11 OSTEOARTHRITIS OF RIGHT HIP, UNSPECIFIED OSTEOARTHRITIS TYPE: ICD-10-CM

## 2020-08-13 PROCEDURE — 99213 OFFICE O/P EST LOW 20 MIN: CPT | Mod: 95,,, | Performed by: NURSE PRACTITIONER

## 2020-08-13 PROCEDURE — 99213 PR OFFICE/OUTPT VISIT, EST, LEVL III, 20-29 MIN: ICD-10-PCS | Mod: 95,,, | Performed by: NURSE PRACTITIONER

## 2020-08-13 NOTE — PROGRESS NOTES
"HPI     Dry Eye      Additional comments: Pt complains OU sometimes get irritated,red and   flaky around OU              Comments     Patient's age: 59 y.o. [place]  Occupation: Disabled  Approximate date of last eye examination:  02/03/2020  Name of last eye doctor seen:   [facility] [facility]  Wears glasses? Yes      If yes, wears  Full-time or part-time?  Full  time   Present glasses are: Bifocal, SV Distance, SV Reading?  Progressive lens  Approximate age of present glasses:  2+yrs   Got new glasses following last exam, or subsequently?:  No    Any problem with VA with glasses?  Pt states OU is extremely dry. Pt   reports flakes above OU lids and puffy   Wears CLs?:  Yes            If yes:              Type of CL worn:  1 Day Acuvue Moist                                            OD 8.5/ +4.50 sphere                                             OS 8.5/ +4.00 sphere              Wears full-time or part-time:  Part time                Sleeps with contact lenses:  No                CL Solution used:                 How often replace CLs:  Dailies               Any problem with VA with CLs?  Yes, overall changes               Has patient read and signed the contact lens fee information   document? Yes   Headaches?  Intermittent   Eye pain/discomfort?  Dryness                                                                                      Flashes?  No   Floaters?  No   Diplopia/Double vision?  No   Patient's Ocular History:         Any eye surgeries? No          Any eye injury?  No          Any treatment for eye disease?  No   Family history of eye disease?  No   Significant patient medical history:         1. Diabetes?  no       If yes, IDDM or NIDDM?   n/a               2. HBP?  No               3. Other (describe):  No    ! OTC eyedrops currently using:     ! Prescription eye meds currently using:           Serum tears (Dr. Hardy)  QID OU  "every time I pass the   refrigerator"           Restasis OU BID "            Refresh all day OU            Xiidra BID OU   ! Any history of allergy/adverse reaction to any eye meds used   previously?  No     ! Any history of allergy/adverse reaction to eyedrops used during prior   eye exam(s)? no    ! Any history of allergy/adverse reaction to Novacaine or similar meds?   no   ! Any history of allergy/adverse reaction to Epinephrine or similar meds?   no    ! Patient okay with use of anesthetic eyedrops to check eye pressure?    yes        ! Patient okay with use of eyedrops to dilate pupils today?  yes   !  Allergies/Medications/Medical History/Family History reviewed today?    yes      PD =     Desired reading distance =                                                                         Last edited by Kevin Moody MA on 8/12/2020 12:00 PM. (History)            Assessment /Plan     For exam results, see Encounter Report.    1. Dermatitis  erythromycin (ROMYCIN) ophthalmic ointment   2. Bilateral dry eyes     3. Eye irritation                      Prior diagnosis of bilateral dry eye.  Using Restasis Ophthalmic Emulsion in both eyes in both eyes, Genteal gel in both eyes at bedtime, OTC artificial tears, and autologous tears (prescribed by Dr. Armas).     presents today with signs consistent with mild(!) periocular dermatitis of upper eyelid bilaterally.  Suggest trial with application of bland ophthalmic ointment to the affected eternal eyelid.      Prescribed Erythromycin ophthalmic ointment to be applied to the external eyelid tissue on both sides two times per day.     Continue current treatment regimen for bilateral dry eye.  Call to report on symptoms - or return prior to that, if any worsening of signs/symptoms in the interim.

## 2020-08-13 NOTE — H&P (VIEW-ONLY)
Chronic Pain-Tele-Medicine-Established Note (Follow up visit)        The patient location is: Home  The chief complaint leading to consultation is: pain  Visit type: Virtual visit with synchronous audio and video  Total time spent with patient: 15 min  Each patient to whom he or she provides medical services by telemedicine is:  (1) informed of the relationship between the physician and patient and the respective role of any other health care provider with respect to management of the patient; and (2) notified that he or she may decline to receive medical services by telemedicine and may withdraw from such care at any time.            SUBJECTIVE:    Joyce Peterson presents to the clinic for a follow-up appointment for right sided back and hip pain.  She is now s/p right hip injection with no relief, not even short term.  She denies any respiratory changes after the procedure including fever, cough, or SOB.  She did have some relief with lumbar facet injections for back pain in the past.  Her biggest complaint today is right sided back and lateral hip pain with radiation into the front and side of the hip, stopping at the knee.  She denies radiation past the knee at this time.  She denies symptoms on the left. Since the last visit, Joyce Peterson states the pain has been worsening. Current pain intensity is 7/10.    Pain Disability Index Review:  Last 3 PDI Scores 6/17/2020 8/13/2014   Pain Disability Index (PDI) 55 47       Pain Medications:  Tramadol and Naproxen    Opioid Contract: no     report:  Not applicable    Pain Procedures:   3/12/20 Bilateral L4/5 and L5/S1 facet injection- significant benefit of back pain  7/30/20 Right hip injection- no relief     Physical Therapy/Home Exercise: yes    Imaging:     Narrative & Impression     EXAMINATION:  MRI HIP WITHOUT CONTRAST RIGHT     CLINICAL HISTORY:  Hip pain, acute, fracture suspected, neg xray or equivocal (Age => 50y);concern for stress fracture of  femur with strong history of this previous.;  Pain in right hip     TECHNIQUE:  MRI right hip performed without contrast.     COMPARISON:  Radiographs 07/20/2020     FINDINGS:  Bone marrow signal is maintained.  No fracture or infiltrative process.     There is tear of the anterior to superior acetabular labrum.  No paralabral cyst.  Ligamentum teres is attenuated.  There is chondral fissuring over the superolateral femoral head and acetabulum.  No significant joint effusion.     There is tendinosis of the gluteus minimus and medius.  No iliopsoas or trochanteric bursitis.     Note made of left hip gamma nail.     Limited assessment of pelvic soft tissues demonstrates a small uterine fibroid.  No pelvic ascites or lymphadenopathy.     Impression:     1. No fracture or marrow infiltrative process.  2. Degenerative changes of the right hip with tear of the anterior to superior acetabular labrum.     Narrative & Impression     EXAMINATION:  MRI LUMBAR SPINE WITHOUT CONTRAST     CLINICAL HISTORY:  severe acute low back pain; Low back pain     TECHNIQUE:  Multiplanar, multisequence MR images were acquired from the thoracolumbar junction to the sacrum without contrast.     COMPARISON:  MRI of the lumbar spine from 02/20/2017.  Correlation is made to prior radiographs of the lumbar spine from 02/28/2020.     FINDINGS:  Alignment: There is grade 1 anterolisthesis of L4 on L5.  Alignment is otherwise anatomic.     Vertebrae: There is diffuse bone marrow signal heterogeneity with several hemangiomas.  No evidence for marrow infiltrative process or recent fracture.     Discs: There is multilevel disc desiccation.  Mild disc height loss noted at L4-L5.     Cord: Normal.  Conus terminates at L1.     Degenerative findings:     T12-L1: No spinal canal stenosis or neural foraminal narrowing.     L1-L2: Circumferential disc bulge with a left paracentral disc protrusion.  No spinal canal stenosis or neural foraminal  narrowing.     L2-L3: Circumferential disc bulge with mild bilateral facet arthropathy and ligamentum flavum hypertrophy resulting in mild left neural foraminal narrowing.  No spinal canal stenosis.     L3-L4: Circumferential disc bulge with mild bilateral facet arthropathy and ligamentum flavum hypertrophy.  No spinal canal stenosis or neural foraminal narrowing.     L4-L5: Grade 1 anterolisthesis of L4 on L5 with a circumferential disc bulge and superimposed central disc extrusion migrating superiorly and severe bilateral facet arthropathy and ligamentum flavum hypertrophy with several small synovial cysts posteriorly result in severe spinal canal stenosis and moderate left, mild right neural foraminal narrowing.     L5-S1: There is prominent epidural fat effacing the thecal sac.  There is a circumferential disc bulge with mild bilateral facet arthropathy resulting in mild left neural foraminal narrowing.     Paraspinal muscles & soft tissues: Unremarkable.     Impression:     1. Multilevel degenerative changes of the lumbar spine, most significant at the level of L4-L5 where there is a disc extrusion contributing to severe spinal canal stenosis and moderate left and mild right neural foraminal narrowing.         Allergies:   Review of patient's allergies indicates:   Allergen Reactions    Actemra [tocilizumab] Other (See Comments)     Severe dizziness    Codeine Nausea And Vomiting    Gold au 198 Hives and Rash    Hydroxychloroquine Other (See Comments)     Can't remember the reaction      Iodinated contrast media Other (See Comments)     Other reaction(s): BURNING ALL OVER    Iodine Other (See Comments)     Other reaction(s): BURNING ALL OVER - Iodine dye - Not topical    Sulfa (sulfonamide antibiotics) Other (See Comments)     Can't remember the reaction    Zofran [ondansetron hcl (pf)] Nausea And Vomiting     Pt reports that last time she received zofran she started vomiting again    Methotrexate  "analogues Nausea Only    Pneumovax 23 [pneumococcal 23-argenis ps vaccine] Other (See Comments)     "sick"       Current Medications:   Current Outpatient Medications   Medication Sig Dispense Refill    albuterol (ACCUNEB) 1.25 mg/3 mL Nebu Take 3 mLs (1.25 mg total) by nebulization every 6 (six) hours as needed. Rescue 1 Box 11    albuterol (PROVENTIL HFA) 90 mcg/actuation inhaler Inhale 2 puffs into the lungs every 6 (six) hours as needed. Rescue 20.1 g 11    aspirin 81 mg Tab Take 81 mg by mouth every morning.       azelastine (ASTELIN) 137 mcg nasal spray 1 spray (137 mcg total) by Nasal route 2 (two) times daily. 30 mL 11    benzonatate (TESSALON) 100 MG capsule Take 1 capsule by mouth three times a day 9 capsule 0    budesonide-formoterol 160-4.5 mcg (SYMBICORT) 160-4.5 mcg/actuation HFAA Inhale 2 puffs into the lungs every 12 (twelve) hours. 3 Inhaler 3    calcium citrate-vitamin D (CITRACAL + D) 315-200 mg-unit per tablet Take 1 tablet by mouth 2 (two) times daily.       ciprofloxacin HCl (CIPRO) 500 MG tablet Take 500 mg by mouth 2 (two) times daily.      diazePAM (VALIUM) 5 MG tablet Take 1 tablet (5 mg total) by mouth daily as needed (30-60 minutes prior to procedure to help anxiety). 2 tablet 0    diclofenac sodium (VOLTAREN) 1 % Gel APPLY 2 G TOPICALLY 4 (FOUR) TIMES DAILY AS NEEDED. 500 g 5    doxycycline (MONODOX) 50 MG Cap Take 1 capsule (50 mg total) by mouth 2 (two) times daily. 60 capsule 6    erythromycin (ROMYCIN) ophthalmic ointment Apply to affected external eyelid tissue two times per day as directed for fourteen days 3.5 g 2    fluconazole (DIFLUCAN) 100 MG tablet       fluticasone propionate (FLONASE) 50 mcg/actuation nasal spray 2 sprays (100 mcg total) by Each Nostril route once daily. 9.9 mL 11    GUAIFENESIN (MUCINEX ORAL) Take 400 mg by mouth every 4 (four) hours as needed.       INV PROPULSID 10 MG Take 1 tablets (20 mg) by mouth 4 (four) times daily. FOR INVESTIGATIONAL " "USE ONLY. Protocol CIS-USA-154 1440 each 0    leflunomide (ARAVA) 20 MG Tab Take 1 tablet (20 mg total) by mouth once daily. 90 tablet 0    lifitegrast (XIIDRA) 5 % Dpet Place 1 drop into both eyes 2 (two) times daily. 60 each 12    methylPREDNISolone (MEDROL DOSEPACK) 4 mg tablet use as directed 1 Package 0    montelukast (SINGULAIR) 10 mg tablet Take 1 tablet (10 mg total) by mouth nightly. 90 tablet 3    naproxen (NAPROSYN) 500 MG tablet Take 1 tablet (500 mg total) by mouth 2 (two) times daily as needed. 180 tablet 0    nitrofurantoin, macrocrystal-monohydrate, (MACROBID) 100 MG capsule Take 100 mg by mouth 2 (two) times daily.      omeprazole (PRILOSEC) 40 MG capsule Take 1 capsule (40 mg total) by mouth every morning. 30 capsule 6    omeprazole-sodium bicarbonate (ZEGERID) 40-1.1 mg-gram per capsule TAKE 1 CAPSULE BY MOUTH EVERY MORNING. 90 capsule 3    POTASSIUM ORAL Take by mouth once daily.      predniSONE (DELTASONE) 1 MG tablet TAKE 1 TABLET BY MOUTH EVERY DAY 90 tablet 1    predniSONE (DELTASONE) 5 MG tablet Take 5 mg by mouth once daily.      PREMARIN 0.625 mg tablet Take 0.625 mg by mouth once daily.  4    PREMARIN vaginal cream PLACE 0.5 GRAM VAGINALLY 3 (THREE) TIMES A WEEK. 30 g 1    progesterone (PROMETRIUM) 100 MG capsule Take 100 mg by mouth every morning. 1 Capsule Oral Every day, morning      RESTASIS 0.05 % ophthalmic emulsion PLACE 0.4 MLS (1 DROP TOTAL) INTO BOTH EYES 2 (TWO) TIMES DAILY. 60 vial 5    rizatriptan (MAXALT) 10 MG tablet Take 10 mg by mouth as needed for Migraine.       rosuvastatin (CRESTOR) 20 MG tablet Take 1 tablet (20 mg total) by mouth every evening. 90 tablet 3    sarilumab (KEVZARA) 200 mg/1.14 mL Syrg Inject 1.14 mLs (200 mg total) into the skin every 14 (fourteen) days. 2.28 mL 2    sucralfate (CARAFATE) 1 gram tablet TAKE 1 TABLET (1 G TOTAL) BY MOUTH 4 (FOUR) TIMES DAILY. 360 tablet 3    syringe with needle (TUBERCULIN SYRINGE) 1 mL 27 x 1/2" " Syrg To administer methotrexate 7.5mg sc once a week 12 Syringe 3    teriparatide (FORTEO) 20 mcg/dose - 600 mcg/2.4 mL PnIj Inject 0.08 mLs (20 mcg total) into the skin once daily. 2.4 mL 11    traMADol (ULTRAM) 50 mg tablet Take 1 tablet (50 mg total) by mouth every 24 hours as needed for Pain. 7 tablet 0    trospium (SANCTURA) 20 mg Tab tablet TAKE 1 TABLET BY MOUTH TWICE A DAY. DUE FOR FOLLOW UP IN JULY 180 tablet 0    valACYclovir (VALTREX) 1000 MG tablet TAKE 1 TABLET BY MOUTH TWICE A DAY AS NEEDED 20 tablet 3     Current Facility-Administered Medications   Medication Dose Route Frequency Provider Last Rate Last Dose    betamethasone acetate-betamethasone sodium phosphate injection 6 mg  6 mg Intra-articular Once Tj Mayfield MD        bupivacaine (PF) 0.25% (2.5 mg/ml) injection 25 mg  10 mL Intramuscular 1 time in Clinic/HOD Tj Mayfield MD         Facility-Administered Medications Ordered in Other Visits   Medication Dose Route Frequency Provider Last Rate Last Dose    0.9%  NaCl infusion   Intravenous Continuous Callum Singer MD   Stopped at 05/21/19 1100       REVIEW OF SYSTEMS:    GENERAL:  No weight loss, malaise or fevers.  HEENT:  Negative for frequent or significant headaches.  NECK:  Negative for lumps, goiter, pain and significant neck swelling.  RESPIRATORY:  Negative for cough, wheezing or shortness of breath. Asthma.  CARDIOVASCULAR:  Negative for chest pain, leg swelling or palpitations. CAD.  GI:  Negative for abdominal discomfort, blood in stools or black stools or change in bowel habits.  MUSCULOSKELETAL:  See HPI.  SKIN:  Negative for lesions, rash, and itching.  PSYCH:  Negative for sleep disturbance, mood disorder and recent psychosocial stressors.  HEMATOLOGY/LYMPHOLOGY:  Negative for prolonged bleeding, bruising easily or swollen nodes.  NEURO:   No history of headaches, syncope, paralysis, seizures or tremors.  All other reviewed and negative other than HPI.    Past Medical  History:  Past Medical History:   Diagnosis Date    Acid reflux     Allergy     Anemia     Asthma     Coronary artery disease     Degenerative disc disease     Dry eyes     Dry mouth     Gastroparesis     Hyperlipidemia     Lateral meniscus derangement 4/6/2016    Lobular carcinoma in situ     Osteoarthritis     Osteoporosis     Rheumatoid arthritis(714.0)     Umbilical hernia 8/13/2015       Past Surgical History:  Past Surgical History:   Procedure Laterality Date    BREAST BIOPSY Left 01/29/2002    core bx    CARPAL TUNNEL RELEASE Right 05/2017    CHOLECYSTECTOMY  2004    COLONOSCOPY      10/11    COLONOSCOPY N/A 6/29/2017    Procedure: COLONOSCOPY;  Surgeon: López Moore MD;  Location: [facility] (4TH FLR);  Service: Endoscopy;  Laterality: N/A;    INJECTION OF FACET JOINT Bilateral 3/12/2020    Procedure: FACET JOINT INJECTION (LUMBAR BLOCK) BILATERAL L4-5 AND L5-S1 DIRECT REFERRAL;  Surgeon: Tj Mayfield MD;  Location: [facility], [place], operated by [facility];  Service: Pain Management;  Laterality: Bilateral;  NEEDS CONSENT    INJECTION OF JOINT Right 7/30/2020    Procedure: INJECTION, JOINT, RIGHT HIP Interarticular under flouro;  Surgeon: Tj Mayfield MD;  Location: [facility], [place], operated by [facility];  Service: Pain Management;  Laterality: Right;  INJECTION, JOINT, RIGHT HIP Interarticular under flouro    INTRAMEDULLARY RODDING OF FEMUR Left 5/21/2019    Procedure: INSERTION, INTRAMEDULLARY ARIEL, FEMUR;  Surgeon: López Duff MD;  Location: [facility] OR 2ND FLR;  Service: Orthopedics;  Laterality: Left;    REPAIR OF TRICEPS TENDON Left 10/17/2018    Procedure: REPAIR, TENDON, TRICEPS left elbow;  Surgeon: Staci Yarbrough MD;  Location: [facility] OR 1ST FLR;  Service: Orthopedics;  Laterality: Left;  Anesthesia: General and regional. PRONE, k-wire , hand pan 1 and pan 2, CALL ARTHREX/Tamera notified 10-12 LO    TONSILLECTOMY      TUBAL LIGATION  2003    UPPER GASTROINTESTINAL ENDOSCOPY      10/11    uterine ablation   2003       Family History:  Family History   Problem Relation Age of Onset    Cancer Mother         Lung Cancer    Emphysema Mother     Heart attack Mother     Cancer Father         Lung Cancer    Osteoarthritis Father     Lung cancer Father     Skin cancer Father     Heart disease Brother     Heart attack Brother     Osteoarthritis Paternal Aunt     Retinal detachment Maternal Aunt     No Known Problems Sister     No Known Problems Maternal Uncle     No Known Problems Paternal Uncle     No Known Problems Maternal Grandmother     No Known Problems Maternal Grandfather     No Known Problems Paternal Grandmother     No Known Problems Paternal Grandfather     Colon cancer Neg Hx     Esophageal cancer Neg Hx     Stomach cancer Neg Hx     Celiac disease Neg Hx     Diabetes Neg Hx     Thyroid disease Neg Hx     Amblyopia Neg Hx     Blindness Neg Hx     Cataracts Neg Hx     Glaucoma Neg Hx     Hypertension Neg Hx     Macular degeneration Neg Hx     Strabismus Neg Hx     Stroke Neg Hx        Social History:  Social History     Socioeconomic History    Marital status:      Spouse name: Not on file    Number of children: Not on file    Years of education: Not on file    Highest education level: Not on file   Occupational History    Not on file   Social Needs    Financial resource strain: Not on file    Food insecurity     Worry: Not on file     Inability: Not on file    Transportation needs     Medical: Not on file     Non-medical: Not on file   Tobacco Use    Smoking status: Never Smoker    Smokeless tobacco: Never Used   Substance and Sexual Activity    Alcohol use: Yes     Comment: occasionally    Drug use: No    Sexual activity: Yes     Partners: Male     Birth control/protection: Surgical   Lifestyle    Physical activity     Days per week: Not on file     Minutes per session: Not on file    Stress: Not on file   Relationships    Social connections     Talks on phone:  Not on file     Gets together: Not on file     Attends Zoroastrian service: Not on file     Active member of club or organization: Not on file     Attends meetings of clubs or organizations: Not on file     Relationship status: Not on file   Other Topics Concern    Are you pregnant or think you may be? Not Asked    Breast-feeding Not Asked   Social History Narrative    Not on file       OBJECTIVE:    LMP  (LMP Unknown)     PHYSICAL EXAMINATION:    General appearance: Well appearing, in no acute distress, alert and oriented x3.  Psych:  Mood and affect appropriate.    ASSESSMENT: 59 y.o. year old female with right sided back and hip pain, consistent with the following diagnoses:     1. Lumbosacral radiculopathy     2. Osteoarthritis of right hip, unspecified osteoarthritis type     3. Primary osteoarthritis of right hip           PLAN:     - Lumbar and hip MRI reviewed with patient.    - Schedule for right L3/4 and L4/5 TF MADELINE for diagnostic and therapeutic purposes.  If limited benefit consider referral back to ortho for right hip labral tear, although hip injection provided no benefit.    - Continue with PT.    - Can continue current medications.    - RTC 2 weeks after procedure.    - Counseled patient regarding the importance of constant sleeping habits and physical therapy.    The above plan and management options were discussed at length with patient. Patient is in agreement with the above and verbalized understanding.    Valarie Law  08/13/2020

## 2020-08-13 NOTE — PROGRESS NOTES
Chronic Pain-Tele-Medicine-Established Note (Follow up visit)        The patient location is: Home  The chief complaint leading to consultation is: pain  Visit type: Virtual visit with synchronous audio and video  Total time spent with patient: 15 min  Each patient to whom he or she provides medical services by telemedicine is:  (1) informed of the relationship between the physician and patient and the respective role of any other health care provider with respect to management of the patient; and (2) notified that he or she may decline to receive medical services by telemedicine and may withdraw from such care at any time.            SUBJECTIVE:    Joyce Peterson presents to the clinic for a follow-up appointment for right sided back and hip pain.  She is now s/p right hip injection with no relief, not even short term.  She denies any respiratory changes after the procedure including fever, cough, or SOB.  She did have some relief with lumbar facet injections for back pain in the past.  Her biggest complaint today is right sided back and lateral hip pain with radiation into the front and side of the hip, stopping at the knee.  She denies radiation past the knee at this time.  She denies symptoms on the left. Since the last visit, Joyce Peterson states the pain has been worsening. Current pain intensity is 7/10.    Pain Disability Index Review:  Last 3 PDI Scores 6/17/2020 8/13/2014   Pain Disability Index (PDI) 55 47       Pain Medications:  Tramadol and Naproxen    Opioid Contract: no     report:  Not applicable    Pain Procedures:   3/12/20 Bilateral L4/5 and L5/S1 facet injection- significant benefit of back pain  7/30/20 Right hip injection- no relief     Physical Therapy/Home Exercise: yes    Imaging:     Narrative & Impression     EXAMINATION:  MRI HIP WITHOUT CONTRAST RIGHT     CLINICAL HISTORY:  Hip pain, acute, fracture suspected, neg xray or equivocal (Age => 50y);concern for stress fracture of  femur with strong history of this previous.;  Pain in right hip     TECHNIQUE:  MRI right hip performed without contrast.     COMPARISON:  Radiographs 07/20/2020     FINDINGS:  Bone marrow signal is maintained.  No fracture or infiltrative process.     There is tear of the anterior to superior acetabular labrum.  No paralabral cyst.  Ligamentum teres is attenuated.  There is chondral fissuring over the superolateral femoral head and acetabulum.  No significant joint effusion.     There is tendinosis of the gluteus minimus and medius.  No iliopsoas or trochanteric bursitis.     Note made of left hip gamma nail.     Limited assessment of pelvic soft tissues demonstrates a small uterine fibroid.  No pelvic ascites or lymphadenopathy.     Impression:     1. No fracture or marrow infiltrative process.  2. Degenerative changes of the right hip with tear of the anterior to superior acetabular labrum.     Narrative & Impression     EXAMINATION:  MRI LUMBAR SPINE WITHOUT CONTRAST     CLINICAL HISTORY:  severe acute low back pain; Low back pain     TECHNIQUE:  Multiplanar, multisequence MR images were acquired from the thoracolumbar junction to the sacrum without contrast.     COMPARISON:  MRI of the lumbar spine from 02/20/2017.  Correlation is made to prior radiographs of the lumbar spine from 02/28/2020.     FINDINGS:  Alignment: There is grade 1 anterolisthesis of L4 on L5.  Alignment is otherwise anatomic.     Vertebrae: There is diffuse bone marrow signal heterogeneity with several hemangiomas.  No evidence for marrow infiltrative process or recent fracture.     Discs: There is multilevel disc desiccation.  Mild disc height loss noted at L4-L5.     Cord: Normal.  Conus terminates at L1.     Degenerative findings:     T12-L1: No spinal canal stenosis or neural foraminal narrowing.     L1-L2: Circumferential disc bulge with a left paracentral disc protrusion.  No spinal canal stenosis or neural foraminal  narrowing.     L2-L3: Circumferential disc bulge with mild bilateral facet arthropathy and ligamentum flavum hypertrophy resulting in mild left neural foraminal narrowing.  No spinal canal stenosis.     L3-L4: Circumferential disc bulge with mild bilateral facet arthropathy and ligamentum flavum hypertrophy.  No spinal canal stenosis or neural foraminal narrowing.     L4-L5: Grade 1 anterolisthesis of L4 on L5 with a circumferential disc bulge and superimposed central disc extrusion migrating superiorly and severe bilateral facet arthropathy and ligamentum flavum hypertrophy with several small synovial cysts posteriorly result in severe spinal canal stenosis and moderate left, mild right neural foraminal narrowing.     L5-S1: There is prominent epidural fat effacing the thecal sac.  There is a circumferential disc bulge with mild bilateral facet arthropathy resulting in mild left neural foraminal narrowing.     Paraspinal muscles & soft tissues: Unremarkable.     Impression:     1. Multilevel degenerative changes of the lumbar spine, most significant at the level of L4-L5 where there is a disc extrusion contributing to severe spinal canal stenosis and moderate left and mild right neural foraminal narrowing.         Allergies:   Review of patient's allergies indicates:   Allergen Reactions    Actemra [tocilizumab] Other (See Comments)     Severe dizziness    Codeine Nausea And Vomiting    Gold au 198 Hives and Rash    Hydroxychloroquine Other (See Comments)     Can't remember the reaction      Iodinated contrast media Other (See Comments)     Other reaction(s): BURNING ALL OVER    Iodine Other (See Comments)     Other reaction(s): BURNING ALL OVER - Iodine dye - Not topical    Sulfa (sulfonamide antibiotics) Other (See Comments)     Can't remember the reaction    Zofran [ondansetron hcl (pf)] Nausea And Vomiting     Pt reports that last time she received zofran she started vomiting again    Methotrexate  "analogues Nausea Only    Pneumovax 23 [pneumococcal 23-argenis ps vaccine] Other (See Comments)     "sick"       Current Medications:   Current Outpatient Medications   Medication Sig Dispense Refill    albuterol (ACCUNEB) 1.25 mg/3 mL Nebu Take 3 mLs (1.25 mg total) by nebulization every 6 (six) hours as needed. Rescue 1 Box 11    albuterol (PROVENTIL HFA) 90 mcg/actuation inhaler Inhale 2 puffs into the lungs every 6 (six) hours as needed. Rescue 20.1 g 11    aspirin 81 mg Tab Take 81 mg by mouth every morning.       azelastine (ASTELIN) 137 mcg nasal spray 1 spray (137 mcg total) by Nasal route 2 (two) times daily. 30 mL 11    benzonatate (TESSALON) 100 MG capsule Take 1 capsule by mouth three times a day 9 capsule 0    budesonide-formoterol 160-4.5 mcg (SYMBICORT) 160-4.5 mcg/actuation HFAA Inhale 2 puffs into the lungs every 12 (twelve) hours. 3 Inhaler 3    calcium citrate-vitamin D (CITRACAL + D) 315-200 mg-unit per tablet Take 1 tablet by mouth 2 (two) times daily.       ciprofloxacin HCl (CIPRO) 500 MG tablet Take 500 mg by mouth 2 (two) times daily.      diazePAM (VALIUM) 5 MG tablet Take 1 tablet (5 mg total) by mouth daily as needed (30-60 minutes prior to procedure to help anxiety). 2 tablet 0    diclofenac sodium (VOLTAREN) 1 % Gel APPLY 2 G TOPICALLY 4 (FOUR) TIMES DAILY AS NEEDED. 500 g 5    doxycycline (MONODOX) 50 MG Cap Take 1 capsule (50 mg total) by mouth 2 (two) times daily. 60 capsule 6    erythromycin (ROMYCIN) ophthalmic ointment Apply to affected external eyelid tissue two times per day as directed for fourteen days 3.5 g 2    fluconazole (DIFLUCAN) 100 MG tablet       fluticasone propionate (FLONASE) 50 mcg/actuation nasal spray 2 sprays (100 mcg total) by Each Nostril route once daily. 9.9 mL 11    GUAIFENESIN (MUCINEX ORAL) Take 400 mg by mouth every 4 (four) hours as needed.       INV PROPULSID 10 MG Take 1 tablets (20 mg) by mouth 4 (four) times daily. FOR INVESTIGATIONAL " "USE ONLY. Protocol CIS-USA-154 1440 each 0    leflunomide (ARAVA) 20 MG Tab Take 1 tablet (20 mg total) by mouth once daily. 90 tablet 0    lifitegrast (XIIDRA) 5 % Dpet Place 1 drop into both eyes 2 (two) times daily. 60 each 12    methylPREDNISolone (MEDROL DOSEPACK) 4 mg tablet use as directed 1 Package 0    montelukast (SINGULAIR) 10 mg tablet Take 1 tablet (10 mg total) by mouth nightly. 90 tablet 3    naproxen (NAPROSYN) 500 MG tablet Take 1 tablet (500 mg total) by mouth 2 (two) times daily as needed. 180 tablet 0    nitrofurantoin, macrocrystal-monohydrate, (MACROBID) 100 MG capsule Take 100 mg by mouth 2 (two) times daily.      omeprazole (PRILOSEC) 40 MG capsule Take 1 capsule (40 mg total) by mouth every morning. 30 capsule 6    omeprazole-sodium bicarbonate (ZEGERID) 40-1.1 mg-gram per capsule TAKE 1 CAPSULE BY MOUTH EVERY MORNING. 90 capsule 3    POTASSIUM ORAL Take by mouth once daily.      predniSONE (DELTASONE) 1 MG tablet TAKE 1 TABLET BY MOUTH EVERY DAY 90 tablet 1    predniSONE (DELTASONE) 5 MG tablet Take 5 mg by mouth once daily.      PREMARIN 0.625 mg tablet Take 0.625 mg by mouth once daily.  4    PREMARIN vaginal cream PLACE 0.5 GRAM VAGINALLY 3 (THREE) TIMES A WEEK. 30 g 1    progesterone (PROMETRIUM) 100 MG capsule Take 100 mg by mouth every morning. 1 Capsule Oral Every day, morning      RESTASIS 0.05 % ophthalmic emulsion PLACE 0.4 MLS (1 DROP TOTAL) INTO BOTH EYES 2 (TWO) TIMES DAILY. 60 vial 5    rizatriptan (MAXALT) 10 MG tablet Take 10 mg by mouth as needed for Migraine.       rosuvastatin (CRESTOR) 20 MG tablet Take 1 tablet (20 mg total) by mouth every evening. 90 tablet 3    sarilumab (KEVZARA) 200 mg/1.14 mL Syrg Inject 1.14 mLs (200 mg total) into the skin every 14 (fourteen) days. 2.28 mL 2    sucralfate (CARAFATE) 1 gram tablet TAKE 1 TABLET (1 G TOTAL) BY MOUTH 4 (FOUR) TIMES DAILY. 360 tablet 3    syringe with needle (TUBERCULIN SYRINGE) 1 mL 27 x 1/2" " Syrg To administer methotrexate 7.5mg sc once a week 12 Syringe 3    teriparatide (FORTEO) 20 mcg/dose - 600 mcg/2.4 mL PnIj Inject 0.08 mLs (20 mcg total) into the skin once daily. 2.4 mL 11    traMADol (ULTRAM) 50 mg tablet Take 1 tablet (50 mg total) by mouth every 24 hours as needed for Pain. 7 tablet 0    trospium (SANCTURA) 20 mg Tab tablet TAKE 1 TABLET BY MOUTH TWICE A DAY. DUE FOR FOLLOW UP IN JULY 180 tablet 0    valACYclovir (VALTREX) 1000 MG tablet TAKE 1 TABLET BY MOUTH TWICE A DAY AS NEEDED 20 tablet 3     Current Facility-Administered Medications   Medication Dose Route Frequency Provider Last Rate Last Dose    betamethasone acetate-betamethasone sodium phosphate injection 6 mg  6 mg Intra-articular Once Tj Mayfield MD        bupivacaine (PF) 0.25% (2.5 mg/ml) injection 25 mg  10 mL Intramuscular 1 time in Clinic/HOD Tj Mayfield MD         Facility-Administered Medications Ordered in Other Visits   Medication Dose Route Frequency Provider Last Rate Last Dose    0.9%  NaCl infusion   Intravenous Continuous Callum Singer MD   Stopped at 05/21/19 1100       REVIEW OF SYSTEMS:    GENERAL:  No weight loss, malaise or fevers.  HEENT:  Negative for frequent or significant headaches.  NECK:  Negative for lumps, goiter, pain and significant neck swelling.  RESPIRATORY:  Negative for cough, wheezing or shortness of breath. Asthma.  CARDIOVASCULAR:  Negative for chest pain, leg swelling or palpitations. CAD.  GI:  Negative for abdominal discomfort, blood in stools or black stools or change in bowel habits.  MUSCULOSKELETAL:  See HPI.  SKIN:  Negative for lesions, rash, and itching.  PSYCH:  Negative for sleep disturbance, mood disorder and recent psychosocial stressors.  HEMATOLOGY/LYMPHOLOGY:  Negative for prolonged bleeding, bruising easily or swollen nodes.  NEURO:   No history of headaches, syncope, paralysis, seizures or tremors.  All other reviewed and negative other than HPI.    Past Medical  History:  Past Medical History:   Diagnosis Date    Acid reflux     Allergy     Anemia     Asthma     Coronary artery disease     Degenerative disc disease     Dry eyes     Dry mouth     Gastroparesis     Hyperlipidemia     Lateral meniscus derangement 4/6/2016    Lobular carcinoma in situ     Osteoarthritis     Osteoporosis     Rheumatoid arthritis(714.0)     Umbilical hernia 8/13/2015       Past Surgical History:  Past Surgical History:   Procedure Laterality Date    BREAST BIOPSY Left 01/29/2002    core bx    CARPAL TUNNEL RELEASE Right 05/2017    CHOLECYSTECTOMY  2004    COLONOSCOPY      10/11    COLONOSCOPY N/A 6/29/2017    Procedure: COLONOSCOPY;  Surgeon: López Moore MD;  Location: [facility] (4TH FLR);  Service: Endoscopy;  Laterality: N/A;    INJECTION OF FACET JOINT Bilateral 3/12/2020    Procedure: FACET JOINT INJECTION (LUMBAR BLOCK) BILATERAL L4-5 AND L5-S1 DIRECT REFERRAL;  Surgeon: Tj Mayfield MD;  Location: [facility] PAIN MGT;  Service: Pain Management;  Laterality: Bilateral;  NEEDS CONSENT    INJECTION OF JOINT Right 7/30/2020    Procedure: INJECTION, JOINT, RIGHT HIP Interarticular under flouro;  Surgeon: Tj Mayfield MD;  Location: [facility]IN [facility]GT;  Service: Pain Management;  Laterality: Right;  INJECTION, JOINT, RIGHT HIP Interarticular under flouro    INTRAMEDULLARY RODDING OF FEMUR Left 5/21/2019    Procedure: INSERTION, INTRAMEDULLARY ARIEL, FEMUR;  Surgeon: López Duff MD;  Location: [facility] OR 2ND FLR;  Service: Orthopedics;  Laterality: Left;    REPAIR OF TRICEPS TENDON Left 10/17/2018    Procedure: REPAIR, TENDON, TRICEPS left elbow;  Surgeon: Staci Yarbrough MD;  Location: [facility] OR 1ST FLR;  Service: Orthopedics;  Laterality: Left;  Anesthesia: General and regional. PRONE, k-wire , hand pan 1 and pan 2, CALL ARTHREX/Tamera notified 10-12 LO    TONSILLECTOMY      TUBAL LIGATION  2003    UPPER GASTROINTESTINAL ENDOSCOPY      10/11    uterine ablation   2003       Family History:  Family History   Problem Relation Age of Onset    Cancer Mother         Lung Cancer    Emphysema Mother     Heart attack Mother     Cancer Father         Lung Cancer    Osteoarthritis Father     Lung cancer Father     Skin cancer Father     Heart disease Brother     Heart attack Brother     Osteoarthritis Paternal Aunt     Retinal detachment Maternal Aunt     No Known Problems Sister     No Known Problems Maternal Uncle     No Known Problems Paternal Uncle     No Known Problems Maternal Grandmother     No Known Problems Maternal Grandfather     No Known Problems Paternal Grandmother     No Known Problems Paternal Grandfather     Colon cancer Neg Hx     Esophageal cancer Neg Hx     Stomach cancer Neg Hx     Celiac disease Neg Hx     Diabetes Neg Hx     Thyroid disease Neg Hx     Amblyopia Neg Hx     Blindness Neg Hx     Cataracts Neg Hx     Glaucoma Neg Hx     Hypertension Neg Hx     Macular degeneration Neg Hx     Strabismus Neg Hx     Stroke Neg Hx        Social History:  Social History     Socioeconomic History    Marital status:      Spouse name: Not on file    Number of children: Not on file    Years of education: Not on file    Highest education level: Not on file   Occupational History    Not on file   Social Needs    Financial resource strain: Not on file    Food insecurity     Worry: Not on file     Inability: Not on file    Transportation needs     Medical: Not on file     Non-medical: Not on file   Tobacco Use    Smoking status: Never Smoker    Smokeless tobacco: Never Used   Substance and Sexual Activity    Alcohol use: Yes     Comment: occasionally    Drug use: No    Sexual activity: Yes     Partners: Male     Birth control/protection: Surgical   Lifestyle    Physical activity     Days per week: Not on file     Minutes per session: Not on file    Stress: Not on file   Relationships    Social connections     Talks on phone:  Not on file     Gets together: Not on file     Attends [employer] service: Not on file     Active member of club or organization: Not on file     Attends meetings of clubs or organizations: Not on file     Relationship status: Not on file   Other Topics Concern    Are you pregnant or think you may be? Not Asked    Breast-feeding Not Asked   Social History Narrative    Not on file       OBJECTIVE:    LMP  (LMP Unknown)     PHYSICAL EXAMINATION:    General appearance: Well appearing, in no acute distress, alert and oriented x3.  Psych:  Mood and affect appropriate.    ASSESSMENT: 59 y.o. year old female with right sided back and hip pain, consistent with the following diagnoses:     1. Lumbosacral radiculopathy     2. Osteoarthritis of right hip, unspecified osteoarthritis type     3. Primary osteoarthritis of right hip           PLAN:     - Lumbar and hip MRI reviewed with patient.    - Schedule for right L3/4 and L4/5 TF MADELINE for diagnostic and therapeutic purposes.  If limited benefit consider referral back to ortho for right hip labral tear, although hip injection provided no benefit.    - Continue with PT.    - Can continue current medications.    - RTC 2 weeks after procedure.    - Counseled patient regarding the importance of constant sleeping habits and physical therapy.    The above plan and management options were discussed at length with patient. Patient is in agreement with the above and verbalized understanding.    Valarie Law  08/13/2020

## 2020-08-13 NOTE — PATIENT INSTRUCTIONS
Prior diagnosis of bilateral dry eye.  Using Restasis Ophthalmic Emulsion in both eyes in both eyes, Genteal gel in both eyes at bedtime, OTC artificial tears, and autologous tears (prescribed by Dr. Armas).     presents today with signs consistent with mild(!) periocular dermatitis of upper eyelid bilaterally.  Suggest trial with application of bland ophthalmic ointment to the affected eternal eyelid.      Prescribed Erythromycin ophthalmic ointment to be applied to the external eyelid tissue on both sides two times per day.     Continue current treatment regimen for bilateral dry eye.  Call to report on symptoms - or return prior to that, if any worsening of signs/symptoms in the interim.

## 2020-08-19 ENCOUNTER — TELEPHONE (OUTPATIENT)
Dept: RHEUMATOLOGY | Facility: CLINIC | Age: 60
End: 2020-08-19

## 2020-08-19 NOTE — TELEPHONE ENCOUNTER
Please tell pt she should not be using diclofenac gel as well as taking naproxen orally. Suggest stopping the gel, and continuing the naproxe. Thanks. MEGA

## 2020-08-20 ENCOUNTER — TELEPHONE (OUTPATIENT)
Dept: PHARMACY | Facility: CLINIC | Age: 60
End: 2020-08-20

## 2020-08-21 ENCOUNTER — PATIENT OUTREACH (OUTPATIENT)
Dept: ADMINISTRATIVE | Facility: OTHER | Age: 60
End: 2020-08-21

## 2020-08-24 ENCOUNTER — OFFICE VISIT (OUTPATIENT)
Dept: HEMATOLOGY/ONCOLOGY | Facility: CLINIC | Age: 60
End: 2020-08-24
Payer: MEDICARE

## 2020-08-24 DIAGNOSIS — D50.8 OTHER IRON DEFICIENCY ANEMIA: Primary | ICD-10-CM

## 2020-08-24 PROCEDURE — 99443 PR PHYSICIAN TELEPHONE EVALUATION 21-30 MIN: CPT | Mod: 95,,, | Performed by: INTERNAL MEDICINE

## 2020-08-24 PROCEDURE — 99443 PR PHYSICIAN TELEPHONE EVALUATION 21-30 MIN: ICD-10-PCS | Mod: 95,,, | Performed by: INTERNAL MEDICINE

## 2020-08-24 NOTE — PROGRESS NOTES
Established Patient - Audio Only Telehealth Visit     The patient location is: home  The chief complaint leading to consultation is: ROBERT  Visit type: Virtual visit with audio only (telephone)  Total time spent with patient: 16 minutes  Total time spent in preparation and with patient: 25 minutes     The reason for the audio only service rather than synchronous audio and video virtual visit was related to technical difficulties or patient preference/necessity.     Each patient to whom I provide medical services by telemedicine is:  (1) informed of the relationship between the physician and patient and the respective role of any other health care provider with respect to management of the patient; and (2) notified that they may decline to receive medical services by telemedicine and may withdraw from such care at any time. Patient verbally consented to receive this service via voice-only telephone call.    SECTION OF HEMATOLOGY AND BONE MARROW TRANSPLANT  Return  Patient Visit   08/24/2020  Referred by:  No ref. provider found  Referred for: ROBERT    CHIEF COMPLAINT:   No chief complaint on file.      HISTORY OF PRESENT ILLNESS:   59 y.o. female Referred to me November 2017  for ROBERT.  Intolerant to po iron.  Iron studies in march 2017 with severe ID.  History RA followed by Dr. Valverde.  History of gastroparesis followed by Dr. Moore in GI.  Denies fever, chills, nightsweats, bleeding, brusing, lymphadenopathy, signs/symptoms of splenomegaly.   Had upper and lower endoscopy summer 2017 unremarkable.  No VCE done.  Denies any overt GI bleeding or other bleeding.    S/p repeat IV feraheme x 2 January 2018,  may 2019 and oct/nov 2019.  Tolerated well.   Maintained ferritin and hgb since that time. Continues close fu with rheum for RA and osteoperosis.      Interval history: Ms. Peterson presents via audio visit for follow-up for malabsorption-associated ROBERT. Continues treatment for RA and osteoarthritis. Denies fatigue,  palpitations, chest pain, SOB, and bleeding. She has been social distancing due to COVID. She denies fevers, SOB, cough, loss of taste or smell, or other symptoms of COVID. States she is feeling really well today.      ROBERT  Microcytic anemia likely ROBERT  due to malabsorption related to GI issues; possible anemia of chronic inflammation from RA and meds contributing  Denies any over GI bleeding; Recent summer 2017 upper and lower GI endoscopy negative; VCE deferred but may need if ROBERT persists  Intolerant to po iron   S/p feraheme x 2 jan 2018, may 2019, and oct/nov 2019 with normalization of hgb and ferritin. Tolerated well.  Her labs  Today show normal ferritin and hemogram   Fu with rheum for RA  Given persistently normal ferritin and hgb feel safe to being spacing out lab check intervals to q 6 months; if normal in 1 year may sign off for pcp to monitor   Patient understands that she should f/u sooner is symptomatic    Follow-up: -cbc, ferritin lab only in 6 months  -same labs and telemed NP appt in 1 year

## 2020-08-24 NOTE — PROGRESS NOTES
Subjective:      Patient ID: Joyce Peterson is a 59 y.o. female.    Chief Complaint: Hip Pain (right)    Ms Peterson is a 60 yo female here for follow up of low back pain.  She was last seen by me on 5/25/2020 and she was sent for bilateral L4-5 and L5-s1 facet injections done 3/12 which helped.  She was sent to disucss other injections with Dr. Mayfield.  She had a right hip injection 7/30/2020 and is scheduled for MADELINE 8/31/20.  He feels like the hip injection did not help.  He feels like the pain in right outer hip is bothering her.  She feels like back pain is also hurting.  The pain is worse with sleeping at night on the right side and in the evening after activity.  The pain is an achy pain.  It is constant.  She cannot take the tizanidine every night, because feels too drugged.  The meds do help her sleep, but the pain is worse before med takes effect.  The pain is 5/10 now, worst 8/10 at night, best 5/10 in the morning.  She does not feel like the position matters.  Last week she twisted wrong and pain was severe.  She has started PT     MRI right hip  Bone marrow signal is maintained.  No fracture or infiltrative process.     There is tear of the anterior to superior acetabular labrum.  No paralabral cyst.  Ligamentum teres is attenuated.  There is chondral fissuring over the superolateral femoral head and acetabulum.  No significant joint effusion.     There is tendinosis of the gluteus minimus and medius.  No iliopsoas or trochanteric bursitis.     Note made of left hip gamma nail.     Limited assessment of pelvic soft tissues demonstrates a small uterine fibroid.  No pelvic ascites or lymphadenopathy.     Impression:     1. No fracture or marrow infiltrative process.  2. Degenerative changes of the right hip with tear of the anterior to superior acetabular labrum.    MRI lumbar 3/2020  Alignment: There is grade 1 anterolisthesis of L4 on L5.  Alignment is otherwise anatomic.    Vertebrae: There is  diffuse bone marrow signal heterogeneity with several hemangiomas.  No evidence for marrow infiltrative process or recent fracture.    Discs: There is multilevel disc desiccation.  Mild disc height loss noted at L4-L5.    Cord: Normal.  Conus terminates at L1.    Degenerative findings:    T12-L1: No spinal canal stenosis or neural foraminal narrowing.    L1-L2: Circumferential disc bulge with a left paracentral disc protrusion.  No spinal canal stenosis or neural foraminal narrowing.    L2-L3: Circumferential disc bulge with mild bilateral facet arthropathy and ligamentum flavum hypertrophy resulting in mild left neural foraminal narrowing.  No spinal canal stenosis.    L3-L4: Circumferential disc bulge with mild bilateral facet arthropathy and ligamentum flavum hypertrophy.  No spinal canal stenosis or neural foraminal narrowing.    L4-L5: Grade 1 anterolisthesis of L4 on L5 with a circumferential disc bulge and superimposed central disc extrusion migrating superiorly and severe bilateral facet arthropathy and ligamentum flavum hypertrophy with several small synovial cysts posteriorly result in severe spinal canal stenosis and moderate left, mild right neural foraminal narrowing.    L5-S1: There is prominent epidural fat effacing the thecal sac.  There is a circumferential disc bulge with mild bilateral facet arthropathy resulting in mild left neural foraminal narrowing.    Paraspinal muscles & soft tissues: Unremarkable.    Impression      1. Multilevel degenerative changes of the lumbar spine, most significant at the level of L4-L5 where there is a disc extrusion contributing to severe spinal canal stenosis and moderate left and mild right neural foraminal narrowing.      X-ray thoracic 1/2020  Two views: Alignment is normal.  There is mild DJD.  No fracture dislocation bone bone destruction seen.    Impression      No acute process seen.    MRI hip 9/2019  MRI of the pelvis and left hip from  05/16/2019.    FINDINGS:  Osseous Structures: No acute fracture, avascular necrosis or other abnormality.No marrow signal abnormality to suggest bone marrow replacement process. There are postsurgical changes of open reduction internal fixation of the left femur.    Hip and Sacroiliac joints: No joint effusion.  Right hip demonstrates full-thickness chondral fissuring and labral fraying.  Left hip suboptimally evaluated due to metallic artifact.    Bursae: No trochanteric or iliopsoas bursitis about the right hip.    Soft Tissues: There is increased T2 signal of the gluteus medius and gluteus minimus tendons at their insertion on the greater trochanter.  The remaining muscles and tendons of the pelvis and both hips show no atrophy, edema, mass, tears or other abnormalities.    Misc: There is enlargement of the right ovary, measuring approximately 2.7 by 2.3 cm.    Impression      1. Tendinosis of the right gluteus medius and gluteus minimus.  2. Mild right hip osteoarthritis with labral fraying.  3. No evidence of right hip stress fracture.  4. Right ovarian enlargement.  Recommend further evaluation with pelvic ultrasound.  This report was flagged in Epic as abnormal.    MRI lumbar and thoracic 2/2017  There is adequate alignment of the thoracic and lumbar vertebral bodies.  The vertebral body heights are adequately maintained.  No evidence of marrow replacement process.  No evidence of acute fracture or subluxation/dislocation.  There is disc desiccation and mild disc space narrowing at L5-S1.     The thoracic spinal cord, conus medullaris, and lumbar spinal nerve roots demonstrate normal signal. No enhancing intradural mass or abnormal leptomeningeal enhancement. No intramedullary cord enhancement.    The soft tissue structures demonstrate a prominent common bile duct measuring 0.8 cm, similar to prior CT exam from 11/14/16.    No focal disc herniation or significant spinal canal or neural foraminal narrowing at  any level in the thoracic spine.    L1-L2: No focal disc herniation or significant spinal canal or neural foraminal narrowing.    L2-L3: Circumferential disc bulge (asymmetric to the left) results in mild left neural foraminal narrowing.    L3-L4: Minimal circumferential disc bulge without significant spinal canal or neural foraminal narrowing.    L4-L5: Circumferential disc bulge and bilateral facet arthropathy (right greater than left) results in mild left neural foraminal narrowing.    L5-S1: There is circumferential disc bulge with small posterior annular fissure, bilateral facet arthropathy, and a 3 mm synovial cyst projecting off the anterior aspect of the facet joint into the neural foramen which results in no significant spinal canal or neural foraminal narrowing.    Impression        Mild lumbar spondylosis as detailed above results in mild neuroforaminal narrowing at varying levels.  No significant spinal canal stenosis in the lumbar spine.    Small annular fissure in the posterior aspect of the L5-S1 disc.    No significant spinal canal or neural foraminal narrowing at any level the thoracic spine.    MRI cervical 2013  No significant alignment abnormality.  Vertebral body heights are normally maintained, without compression deformity at any level.  No significant disc narrowing.  Some very minimal disc bulging is observed at C5-6 and C6-7, but there is no evidence of a   focal disc herniation at any cervical or visualized upper thoracic level.  AP diameter of the spinal canal is well maintained at all levels, with no canal stenosis/extrinsic cord compression evident.  The spinal cord is well delineated from the   cervicomedullary junction to the T4 level, and is normal in size and configuration without focal enlargement or irregularity.  No Chiari malformation or cord cyst or syrinx.  No abnormal signal from the substance of the cord on any of the pulse sequences   generated.  No significant neural  foraminal narrowing.  No significant signal abnormality referable to the osseous structures.    Impression     Cervical spine MRI examination demonstrates no significant abnormality.  No evidence of focal disc herniation, significant canal stenosis/extrinsic cord compression, or intramedullary cord lesion.        Past Medical History:  No date: Acid reflux  No date: Allergy  No date: Anemia  No date: Asthma  No date: Coronary artery disease  No date: Degenerative disc disease  No date: Dry eyes  No date: Dry mouth  No date: Gastroparesis  No date: Hyperlipidemia  4/6/2016: Lateral meniscus derangement  No date: Lobular carcinoma in situ  No date: Osteoarthritis  No date: Osteoporosis  No date: Rheumatoid arthritis(714.0)  8/13/2015: Umbilical hernia    Past Surgical History:  01/29/2002: BREAST BIOPSY; Left      Comment:  core bx  05/2017: CARPAL TUNNEL RELEASE; Right  2004: CHOLECYSTECTOMY  No date: COLONOSCOPY      Comment:  10/11  6/29/2017: COLONOSCOPY; N/A      Comment:  Procedure: COLONOSCOPY;  Surgeon: López Moore MD;                 Location: [facility] (4TH FLR);  Service: Endoscopy;                 Laterality: N/A;  5/21/2019: INTRAMEDULLARY RODDING OF FEMUR; Left      Comment:  Procedure: INSERTION, INTRAMEDULLARY ARIEL, FEMUR;                 Surgeon: López Duff MD;  Location: [facility] OR 2ND                FLR;  Service: Orthopedics;  Laterality: Left;  10/17/2018: REPAIR OF TRICEPS TENDON; Left      Comment:  Procedure: REPAIR, TENDON, TRICEPS left elbow;  Surgeon:               Staci Yarbrough MD;  Location: [facility] OR 1ST FLR;                 Service: Orthopedics;  Laterality: Left;  Anesthesia:                General and regional. PRONE, k-wire , hand pan 1                and pan 2, CALL ARTHREX/Tamera notified 10-12 LO  No date: TONSILLECTOMY  2003: TUBAL LIGATION  No date: UPPER GASTROINTESTINAL ENDOSCOPY      Comment:  10/11  2003: uterine ablation    Review of patient's family  history indicates:  Problem: Cancer      Relation: Mother          Age of Onset: (Not Specified)          Comment: Lung Cancer  Problem: Emphysema      Relation: Mother          Age of Onset: (Not Specified)  Problem: Heart attack      Relation: Mother          Age of Onset: (Not Specified)  Problem: Cancer      Relation: Father          Age of Onset: (Not Specified)          Comment: Lung Cancer  Problem: Osteoarthritis      Relation: Father          Age of Onset: (Not Specified)  Problem: Lung cancer      Relation: Father          Age of Onset: (Not Specified)  Problem: Skin cancer      Relation: Father          Age of Onset: (Not Specified)  Problem: Heart disease      Relation: Brother          Age of Onset: (Not Specified)  Problem: Heart attack      Relation: Brother          Age of Onset: (Not Specified)  Problem: Osteoarthritis      Relation: Paternal Aunt          Age of Onset: (Not Specified)  Problem: Retinal detachment      Relation: Maternal Aunt          Age of Onset: (Not Specified)  Problem: No Known Problems      Relation: Sister          Age of Onset: (Not Specified)  Problem: No Known Problems      Relation: Maternal Uncle          Age of Onset: (Not Specified)  Problem: No Known Problems      Relation: Paternal Uncle          Age of Onset: (Not Specified)  Problem: No Known Problems      Relation: Maternal Grandmother          Age of Onset: (Not Specified)  Problem: No Known Problems      Relation: Maternal Grandfather          Age of Onset: (Not Specified)  Problem: No Known Problems      Relation: Paternal Grandmother          Age of Onset: (Not Specified)  Problem: No Known Problems      Relation: Paternal Grandfather          Age of Onset: (Not Specified)  Problem: Colon cancer      Relation: Neg Hx          Age of Onset: (Not Specified)  Problem: Esophageal cancer      Relation: Neg Hx          Age of Onset: (Not Specified)  Problem: Stomach cancer      Relation: Neg Hx          Age of  Onset: (Not Specified)  Problem: Celiac disease      Relation: Neg Hx          Age of Onset: (Not Specified)  Problem: Diabetes      Relation: Neg Hx          Age of Onset: (Not Specified)  Problem: Thyroid disease      Relation: Neg Hx          Age of Onset: (Not Specified)  Problem: Amblyopia      Relation: Neg Hx          Age of Onset: (Not Specified)  Problem: Blindness      Relation: Neg Hx          Age of Onset: (Not Specified)  Problem: Cataracts      Relation: Neg Hx          Age of Onset: (Not Specified)  Problem: Glaucoma      Relation: Neg Hx          Age of Onset: (Not Specified)  Problem: Hypertension      Relation: Neg Hx          Age of Onset: (Not Specified)  Problem: Macular degeneration      Relation: Neg Hx          Age of Onset: (Not Specified)  Problem: Strabismus      Relation: Neg Hx          Age of Onset: (Not Specified)  Problem: Stroke      Relation: Neg Hx          Age of Onset: (Not Specified)      Social History    Socioeconomic History      Marital status:       Spouse name: Not on file      Number of children: Not on file      Years of education: Not on file      Highest education level: Not on file    Occupational History      Not on file    Social Needs      Financial resource strain: Not on file      Food insecurity:        Worry: Not on file        Inability: Not on file      Transportation needs:        Medical: Not on file        Non-medical: Not on file    Tobacco Use      Smoking status: Never Smoker      Smokeless tobacco: Never Used    Substance and Sexual Activity      Alcohol use: Yes        Comment: occasionally      Drug use: No      Sexual activity: Yes        Partners: Male        Birth control/protection: Surgical    Lifestyle      Physical activity:        Days per week: Not on file        Minutes per session: Not on file      Stress: Not on file    Relationships      Social connections:        Talks on phone: Not on file        Gets together: Not on file         Attends Religion service: Not on file        Active member of club or organization: Not on file        Attends meetings of clubs or organizations: Not on file        Relationship status: Not on file    Other Topics      Concerns:        Are you pregnant or think you may be?: Not Asked        Breast-feeding: Not Asked    Social History Narrative      Not on file      Current Outpatient Medications:  albuterol (ACCUNEB) 1.25 mg/3 mL Nebu, Take 3 mLs (1.25 mg total) by nebulization every 6 (six) hours as needed. Rescue, Disp: 1 Box, Rfl: 2  albuterol (PROVENTIL HFA) 90 mcg/actuation inhaler, Inhale 2 puffs into the lungs every 6 (six) hours as needed. Rescue, Disp: 20.1 Inhaler, Rfl: 11  amoxicillin (AMOXIL) 500 MG capsule, Take 1 capsule by mouth three times a day., Disp: 30 capsule, Rfl: 0  ampicillin (PRINCIPEN) 500 MG capsule, TAKE 1 CAPUSLE BY MOUTH EVERY 6 HOURS, Disp: , Rfl: 2  aspirin 81 mg Tab, Take 81 mg by mouth every morning. , Disp: , Rfl:   azelastine (ASTELIN) 137 mcg nasal spray, 1 spray (137 mcg total) by Nasal route 2 (two) times daily., Disp: 30 mL, Rfl: 11  azithromycin (Z-DEVON) 250 MG tablet, , Disp: , Rfl:   benzonatate (TESSALON) 100 MG capsule, Take 1 capsule by mouth three times a day, Disp: 9 capsule, Rfl: 0  budesonide-formoterol 160-4.5 mcg (SYMBICORT) 160-4.5 mcg/actuation HFAA, Inhale 2 puffs into the lungs every 12 (twelve) hours., Disp: 3 Inhaler, Rfl: 3  calcium citrate-vitamin D (CITRACAL + D) 315-200 mg-unit per tablet, Take 1 tablet by mouth 2 (two) times daily. , Disp: , Rfl:   diazePAM (VALIUM) 10 MG Tab, Take 1 tablet (10 mg total) by mouth On call Procedure., Disp: 3 tablet, Rfl: 0  diclofenac sodium (VOLTAREN) 1 % Gel, APPLY 2 G TOPICALLY 4 (FOUR) TIMES DAILY AS NEEDED., Disp: 500 g, Rfl: 5  doxycycline (MONODOX) 50 MG Cap, Take 1 capsule (50 mg total) by mouth 2 (two) times daily., Disp: 60 capsule, Rfl: 6  fluconazole (DIFLUCAN) 100 MG tablet, , Disp: , Rfl:   fluticasone  propionate (FLONASE) 50 mcg/actuation nasal spray, 2 sprays (100 mcg total) by Each Nostril route once daily., Disp: 9.9 mL, Rfl: 11  GUAIFENESIN (MUCINEX ORAL), Take 400 mg by mouth every 4 (four) hours as needed. , Disp: , Rfl:   INV PROPULSID 10 MG, Take 1 tablets (20 mg) by mouth 4 (four) times daily. FOR INVESTIGATIONAL USE ONLY. Protocol CIS-USA-154, Disp: 1560 each, Rfl: 0  leflunomide (ARAVA) 20 MG Tab, Take 1 tablet (20 mg total) by mouth once daily., Disp: 90 tablet, Rfl: 0  lifitegrast (XIIDRA) 5 % Dpet, Place 1 drop into both eyes 2 (two) times daily., Disp: 60 each, Rfl: 12  methylPREDNISolone (MEDROL DOSEPACK) 4 mg tablet, use as directed, Disp: 1 Package, Rfl: 0  montelukast (SINGULAIR) 10 mg tablet, Take 1 tablet (10 mg total) by mouth nightly., Disp: 90 tablet, Rfl: 3  naproxen (NAPROSYN) 500 MG tablet, TAKE 1 TABLET TWICE A DAY AS NEEDED, Disp: 180 tablet, Rfl: 0  nitrofurantoin, macrocrystal-monohydrate, (MACROBID) 100 MG capsule, , Disp: , Rfl:   omeprazole (PRILOSEC) 40 MG capsule, Take 1 capsule (40 mg total) by mouth every morning., Disp: 30 capsule, Rfl: 6  omeprazole-sodium bicarbonate (ZEGERID) 40-1.1 mg-gram per capsule, TAKE 1 CAPSULE BY MOUTH EVERY MORNING., Disp: 90 capsule, Rfl: 3  POTASSIUM ORAL, Take by mouth once daily., Disp: , Rfl:   PREMARIN 0.625 mg tablet, Take 0.625 mg by mouth once daily., Disp: , Rfl: 4   PREMARIN vaginal cream, PLACE 0.5 GRAM VAGINALLY 3 (THREE) TIMES A WEEK., Disp: 30 g, Rfl: 3  progesterone (PROMETRIUM) 100 MG capsule, Take 100 mg by mouth every morning. 1 Capsule Oral Every day, morning, Disp: , Rfl:   promethazine-dextromethorphan (PROMETHAZINE-DM) 6.25-15 mg/5 mL Syrp, Take 5 mL by mouth every 6 hours as needed, Disp: 140 mL, Rfl: 0  RESTASIS 0.05 % ophthalmic emulsion, PLACE 0.4 MLS (1 DROP TOTAL) INTO BOTH EYES 2 (TWO) TIMES DAILY., Disp: , Rfl: 5  rizatriptan (MAXALT) 10 MG tablet, Take 10 mg by mouth as needed for Migraine. , Disp: , Rfl:  "  rosuvastatin (CRESTOR) 20 MG tablet, Take 1 tablet (20 mg total) by mouth every evening., Disp: 90 tablet, Rfl: 3  sarilumab (KEVZARA) 200 mg/1.14 mL Syrg, Inject 1.14 mLs (200 mg total) into the skin every 14 (fourteen) days., Disp: 2.28 mL, Rfl: 2  sucralfate (CARAFATE) 1 gram tablet, TAKE 1 TABLET (1 G TOTAL) BY MOUTH 4 (FOUR) TIMES DAILY., Disp: 360 tablet, Rfl: 3  syringe with needle (TUBERCULIN SYRINGE) 1 mL 27 x 1/2" Syrg, To administer methotrexate 7.5mg sc once a week, Disp: 12 Syringe, Rfl: 3  teriparatide (FORTEO) 20 mcg/dose - 600 mcg/2.4 mL PnIj, Inject 0.08 mLs (20 mcg total) into the skin once daily., Disp: 2.4 mL, Rfl: 11  traMADol (ULTRAM) 50 mg tablet, Take 1 tablet (50 mg total) by mouth every 24 hours as needed for Pain., Disp: 7 tablet, Rfl: 0  valACYclovir (VALTREX) 1000 MG tablet, TAKE 1 TABLET BY MOUTH TWICE A DAY AS NEEDED, Disp: 20 tablet, Rfl: 3    No current facility-administered medications for this visit.   Facility-Administered Medications Ordered in Other Visits:  0.9%  NaCl infusion, , Intravenous, Continuous, Callum Singer MD, Stopped at 05/21/19 1100        Review of patient's allergies indicates:   -- Actemra (tocilizumab) -- Other (See Comments)    --  Severe dizziness   -- Codeine -- Nausea And Vomiting   -- Gold au 198 -- Hives and Rash   -- Hydroxychloroquine -- Other (See Comments)    --  Can't remember the reaction   -- Iodinated contrast media -- Other (See Comments)    --  Other reaction(s): BURNING ALL OVER   -- Iodine -- Other (See Comments)    --  Other reaction(s): BURNING ALL OVER - Iodine dye -             Not topical   -- Sulfa (sulfonamide antibiotics) -- Other (See Comments)    --  Can't remember the reaction   -- Zofran (ondansetron hcl (pf)) -- Nausea And Vomiting    --  Pt reports that last time she received zofran she             started vomiting again   -- Methotrexate analogues -- Nausea Only   -- Pneumovax 23 (pneumococcal 23-argenis ps vaccine) -- Other " "(See Comments)    --  "sick"        Review of Systems   Constitution: Negative for weight gain and weight loss.   Cardiovascular: Negative for chest pain.   Respiratory: Negative for shortness of breath.    Musculoskeletal: Positive for back pain and joint pain. Negative for joint swelling.   Gastrointestinal: Negative for abdominal pain, bowel incontinence, nausea and vomiting.   Genitourinary: Negative for bladder incontinence.   Neurological: Negative for numbness and paresthesias.         Objective:        General: Joyce is well-developed, well-nourished, appears stated age, in no acute distress, alert and oriented to time, place and person.     General    Vitals reviewed.  Constitutional: She is oriented to person, place, and time. She appears well-developed and well-nourished.   HENT:   Head: Normocephalic and atraumatic.   Pulmonary/Chest: Effort normal.   Neurological: She is alert and oriented to person, place, and time.   Psychiatric: She has a normal mood and affect. Her behavior is normal. Judgment and thought content normal.             Right Hip Exam     Tenderness  Also right side trochanteric tenderness.  Back (L-Spine & T-Spine) / Neck (C-Spine) Exam     Tenderness   The patient is tender to palpation of the right side trochanteric. Right paramedian tenderness of the Sacrum. Left paramedian tenderness of the Sacrum.     Back (L-Spine & T-Spine) Range of Motion   Extension: 20   Flexion: 90   Lateral bend right: 20   Lateral bend left: 20   Rotation right: 40   Rotation left: 40     Spinal Sensation   Right Side Sensation  C-Spine Level: normal   L-Spine Level: normal  S-Spine Level: normal  Left Side Sensation  C-Spine Level: normal  L-Spine Level: normal  S-Spine Level: normal    Back (L-Spine & T-Spine) Tests   Right Side Tests  Straight leg raise:      Sitting SLR: > 70 degrees      Left Side Tests  Straight leg raise:     Sitting SLR: > 70 degrees          Other She has no scoliosis .  Spinal " Kyphosis:  Absent      Muscle Strength   Right Upper Extremity   Biceps: 5/5/5   Deltoid:  5/5  Triceps:  5/5  Wrist extension: 5/5/5   Finger Flexors:  5/5  Left Upper Extremity  Biceps: 5/5/5   Deltoid:  5/5  Triceps:  5/5  Wrist extension: 5/5/5   Finger Flexors:  5/5  Right Lower Extremity   Hip Flexion: 5/5   Quadriceps:  5/5   Anterior tibial:  5/5/5  EHL:  5/5  Left Lower Extremity   Hip Flexion: 5/5   Quadriceps:  5/5   Anterior tibial:  5/5/5   EHL:  5/5    Reflexes     Left Side  Biceps:  2+  Triceps:  2+  Brachioradialis:  2+  Achilles:  2+  Left Arriaga's Sign:  Absent  Babinski Sign:  absent  Quadriceps:  2+    Right Side   Biceps:  2+  Triceps:  2+  Brachioradialis:  2+  Achilles:  2+  Right Arriaga's Sign:  absent  Babinski Sign:  absent  Quadriceps:  2+    Vascular Exam     Right Pulses        Carotid:                  2+    Left Pulses        Carotid:                  2+              Assessment:       1. Spondylosis of lumbar region without myelopathy or radiculopathy    2. Rheumatoid arthritis involving multiple sites with positive rheumatoid factor    3. Right hip pain    4. Chronic bilateral low back pain without sciatica    5. Steroid-induced osteoporosis           Plan:          1. She feels her back is better.  The right outer hip bothers her the most.  She is going to have deshawn.  She had MRI of the hip  2. We discussed gabapentin but she is really sensitive to meds  3. We discussed the benefits of therapy and exercise and continuing to move. She is doing some exercise on her own  4. Tizanidine 1-2 try at night before bed, she does feel like it makes her sleepy.  She is going to try to take at dinner then before bed  5. Pain management with Dr. Mayfield injection next week right L3-4 and L4-5 TF DESHAWN.  Injection in right hip gave her one day of relief  6. Continue PT for back and core strengthening, flexion exercises and Si joint mobilization and hip [facility] in [facility]  7. Hip MRI does show labral  tear and hip DJD it does not show bursitis  8. RTC 3-4 months            Follow-up: Follow up in about 4 months (around 12/25/2020). If there are any questions prior to this, the patient was instructed to contact the office.

## 2020-08-25 ENCOUNTER — OFFICE VISIT (OUTPATIENT)
Dept: SPINE | Facility: CLINIC | Age: 60
End: 2020-08-25
Attending: PHYSICAL MEDICINE & REHABILITATION
Payer: MEDICARE

## 2020-08-25 VITALS
SYSTOLIC BLOOD PRESSURE: 129 MMHG | HEART RATE: 97 BPM | WEIGHT: 151.88 LBS | HEIGHT: 61 IN | BODY MASS INDEX: 28.67 KG/M2 | DIASTOLIC BLOOD PRESSURE: 78 MMHG

## 2020-08-25 DIAGNOSIS — M54.50 CHRONIC BILATERAL LOW BACK PAIN WITHOUT SCIATICA: ICD-10-CM

## 2020-08-25 DIAGNOSIS — M81.8 STEROID-INDUCED OSTEOPOROSIS: ICD-10-CM

## 2020-08-25 DIAGNOSIS — M47.816 SPONDYLOSIS OF LUMBAR REGION WITHOUT MYELOPATHY OR RADICULOPATHY: Primary | ICD-10-CM

## 2020-08-25 DIAGNOSIS — T38.0X5A STEROID-INDUCED OSTEOPOROSIS: ICD-10-CM

## 2020-08-25 DIAGNOSIS — G89.29 CHRONIC BILATERAL LOW BACK PAIN WITHOUT SCIATICA: ICD-10-CM

## 2020-08-25 DIAGNOSIS — M05.79 RHEUMATOID ARTHRITIS INVOLVING MULTIPLE SITES WITH POSITIVE RHEUMATOID FACTOR: ICD-10-CM

## 2020-08-25 DIAGNOSIS — M25.551 RIGHT HIP PAIN: ICD-10-CM

## 2020-08-25 PROCEDURE — 99999 PR PBB SHADOW E&M-EST. PATIENT-LVL V: ICD-10-PCS | Mod: PBBFAC,,, | Performed by: PHYSICAL MEDICINE & REHABILITATION

## 2020-08-25 PROCEDURE — 3008F BODY MASS INDEX DOCD: CPT | Mod: CPTII,S$GLB,, | Performed by: PHYSICAL MEDICINE & REHABILITATION

## 2020-08-25 PROCEDURE — 99214 PR OFFICE/OUTPT VISIT, EST, LEVL IV, 30-39 MIN: ICD-10-PCS | Mod: S$GLB,,, | Performed by: PHYSICAL MEDICINE & REHABILITATION

## 2020-08-25 PROCEDURE — 99214 OFFICE O/P EST MOD 30 MIN: CPT | Mod: S$GLB,,, | Performed by: PHYSICAL MEDICINE & REHABILITATION

## 2020-08-25 PROCEDURE — 99999 PR PBB SHADOW E&M-EST. PATIENT-LVL V: CPT | Mod: PBBFAC,,, | Performed by: PHYSICAL MEDICINE & REHABILITATION

## 2020-08-25 PROCEDURE — 3008F PR BODY MASS INDEX (BMI) DOCUMENTED: ICD-10-PCS | Mod: CPTII,S$GLB,, | Performed by: PHYSICAL MEDICINE & REHABILITATION

## 2020-08-25 RX ORDER — OMEPRAZOLE AND SODIUM BICARBONATE 40; 1100 MG/1; MG/1
1 CAPSULE ORAL EVERY MORNING
Qty: 90 CAPSULE | Refills: 3 | Status: SHIPPED | OUTPATIENT
Start: 2020-08-25 | End: 2021-08-17

## 2020-08-26 ENCOUNTER — TELEPHONE (OUTPATIENT)
Dept: OTOLARYNGOLOGY | Facility: CLINIC | Age: 60
End: 2020-08-26

## 2020-08-26 ENCOUNTER — TELEPHONE (OUTPATIENT)
Dept: OPHTHALMOLOGY | Facility: CLINIC | Age: 60
End: 2020-08-26

## 2020-08-26 NOTE — TELEPHONE ENCOUNTER
Spoke to pt regarding her dry eye issues   Pt reports that she was rx'd marilee bennett by dr bates and used it for a week   Her dry eye symptoms improved but after discontinue, she reports that she has been getting flaky eye lids and irritates her eyes. Pt reports that her eyes feel very dry and scratchy   rec her start ocusoft lid scrubs bid and use systane eye gtts   Advised pt to update us with msg through my [facility]

## 2020-08-31 ENCOUNTER — HOSPITAL ENCOUNTER (OUTPATIENT)
Facility: OTHER | Age: 60
Discharge: HOME | End: 2020-08-31
Attending: ANESTHESIOLOGY | Admitting: ANESTHESIOLOGY
Payer: MEDICARE

## 2020-08-31 VITALS
RESPIRATION RATE: 16 BRPM | HEART RATE: 81 BPM | BODY MASS INDEX: 28.7 KG/M2 | WEIGHT: 152 LBS | SYSTOLIC BLOOD PRESSURE: 131 MMHG | DIASTOLIC BLOOD PRESSURE: 74 MMHG | HEIGHT: 61 IN | OXYGEN SATURATION: 96 % | TEMPERATURE: 99 F

## 2020-08-31 DIAGNOSIS — M47.9 OSTEOARTHRITIS OF SPINE, UNSPECIFIED SPINAL OSTEOARTHRITIS COMPLICATION STATUS, UNSPECIFIED SPINAL REGION: Primary | ICD-10-CM

## 2020-08-31 DIAGNOSIS — M47.819 SPONDYLOSIS WITHOUT MYELOPATHY: ICD-10-CM

## 2020-08-31 DIAGNOSIS — G89.29 CHRONIC PAIN: ICD-10-CM

## 2020-08-31 PROCEDURE — 25000003 PHARM REV CODE 250: Performed by: ANESTHESIOLOGY

## 2020-08-31 PROCEDURE — 25000003 PHARM REV CODE 250: Performed by: STUDENT IN AN ORGANIZED HEALTH CARE EDUCATION/TRAINING PROGRAM

## 2020-08-31 PROCEDURE — 64484 PRA INJECT ANES/STEROID FORAMEN LUMBAR/SACRAL W IMG GUIDE ,EA ADD LEVEL: ICD-10-PCS | Mod: RT,,, | Performed by: ANESTHESIOLOGY

## 2020-08-31 PROCEDURE — 64483 PR EPIDURAL INJ, ANES/STEROID, TRANSFORAMINAL, LUMB/SACR, SNGL LEVL: ICD-10-PCS | Mod: RT,,, | Performed by: ANESTHESIOLOGY

## 2020-08-31 PROCEDURE — 25500020 PHARM REV CODE 255: Performed by: ANESTHESIOLOGY

## 2020-08-31 PROCEDURE — 64484 NJX AA&/STRD TFRM EPI L/S EA: CPT | Mod: RT | Performed by: ANESTHESIOLOGY

## 2020-08-31 PROCEDURE — 64484 NJX AA&/STRD TFRM EPI L/S EA: CPT | Mod: RT,,, | Performed by: ANESTHESIOLOGY

## 2020-08-31 PROCEDURE — 64483 NJX AA&/STRD TFRM EPI L/S 1: CPT | Mod: RT,,, | Performed by: ANESTHESIOLOGY

## 2020-08-31 PROCEDURE — 64483 NJX AA&/STRD TFRM EPI L/S 1: CPT | Mod: RT | Performed by: ANESTHESIOLOGY

## 2020-08-31 PROCEDURE — 63600175 PHARM REV CODE 636 W HCPCS: Performed by: ANESTHESIOLOGY

## 2020-08-31 RX ORDER — LIDOCAINE HYDROCHLORIDE 10 MG/ML
INJECTION, SOLUTION EPIDURAL; INFILTRATION; INTRACAUDAL; PERINEURAL
Status: DISCONTINUED | OUTPATIENT
Start: 2020-08-31 | End: 2020-08-31 | Stop reason: HOSPADM

## 2020-08-31 RX ORDER — DEXAMETHASONE SODIUM PHOSPHATE 100 MG/10ML
INJECTION INTRAMUSCULAR; INTRAVENOUS
Status: DISCONTINUED | OUTPATIENT
Start: 2020-08-31 | End: 2020-08-31 | Stop reason: HOSPADM

## 2020-08-31 RX ORDER — MIDAZOLAM HYDROCHLORIDE 1 MG/ML
INJECTION INTRAMUSCULAR; INTRAVENOUS
Status: DISCONTINUED | OUTPATIENT
Start: 2020-08-31 | End: 2020-08-31 | Stop reason: HOSPADM

## 2020-08-31 RX ORDER — DIPHENHYDRAMINE HYDROCHLORIDE 50 MG/ML
25 INJECTION INTRAMUSCULAR; INTRAVENOUS ONCE
Status: COMPLETED | OUTPATIENT
Start: 2020-08-31 | End: 2020-08-31

## 2020-08-31 RX ORDER — LIDOCAINE HYDROCHLORIDE 10 MG/ML
INJECTION INFILTRATION; PERINEURAL
Status: DISCONTINUED | OUTPATIENT
Start: 2020-08-31 | End: 2020-08-31 | Stop reason: HOSPADM

## 2020-08-31 RX ORDER — SODIUM CHLORIDE 9 MG/ML
500 INJECTION, SOLUTION INTRAVENOUS CONTINUOUS
Status: DISCONTINUED | OUTPATIENT
Start: 2020-08-31 | End: 2020-08-31 | Stop reason: HOSPADM

## 2020-08-31 RX ORDER — FENTANYL CITRATE 50 UG/ML
INJECTION, SOLUTION INTRAMUSCULAR; INTRAVENOUS
Status: DISCONTINUED | OUTPATIENT
Start: 2020-08-31 | End: 2020-08-31 | Stop reason: HOSPADM

## 2020-08-31 RX ADMIN — DIPHENHYDRAMINE HYDROCHLORIDE 25 MG: 50 INJECTION, SOLUTION INTRAMUSCULAR; INTRAVENOUS at 09:08

## 2020-08-31 RX ADMIN — SODIUM CHLORIDE 500 ML: 0.9 INJECTION, SOLUTION INTRAVENOUS at 09:08

## 2020-08-31 NOTE — OP NOTE
Date of Service: 08/31/2020    PCP: Becca Peters DO    Referring Physician:    Time-out taken to identify patient and procedure side prior to starting the procedure.   I attest that I have reviewed the patient's home medications prior to the procedure and no contraindication have been identified. I  re-evaluated the patient after the patient was positioned for the procedure in the procedure room immediately before the procedural time-out. The vital signs are current and represent the current state of the patient which has not significantly changed since the preprocedure assessment.                                                           PROCEDURE: Right L3/4, L4/5 transforaminal epidural steroid injection under fluoroscopy    REASON FOR PROCEDURE: Right Lumbar radiculopathy [M54.16]  1. Osteoarthritis of spine, unspecified spinal osteoarthritis complication status, unspecified spinal region    2. Spondylosis without myelopathy    3. Chronic pain      POSTPROCEDURE DIAGNOSIS:   Lumbar radiculopathy [M54.16]    1. Osteoarthritis of spine, unspecified spinal osteoarthritis complication status, unspecified spinal region    2. Spondylosis without myelopathy    3. Chronic pain           PHYSICIAN: Tj Mayfield MD  ASSISTANTS:Julia Fischer DO ([facility])    MEDICATIONS INJECTED:  Preservative-free dexamethasone 10mg, Xylocaine 1% MPF 3-5ml. 3ml per level. Preservative free, sterile normal saline is used to get larger volume as needed.  LOCAL ANESTHETIC INJECTED:  Xylocaine 1% 9ml with Sodium Bicarbonate 1ml. 3ml per site.    SEDATION MEDICATIONS: local/IV sedation: Versed 2 mg and fentanyl 25 mcg IV.  Conscious sedation ordered by MD.  Patient reevaluated and sedation administered by MD and monitored by RN.  Total sedation time was less than 10 min. (See nurse documentation and case log for sedation time)    ESTIMATED BLOOD LOSS:  None.    COMPLICATIONS:  None.    TECHNIQUE:   Laying in a prone position,  the patient was prepped and draped in the usual sterile fashion using ChloraPrep and fenestrated drape.  The area to be injected was determined under fluoroscopic guidance.  Local anesthetic was given by raising a wheel and going down to the hub of a 27-gauge 1.25 inch needle.  The 3.5inch 22-gauge spinal needle was introduced towards the transverse process of all levels as stated above.   The needle was walked medially then hinged into the neural foramen.  Omnipaque was injected to confirm appropriate placement and that there was no vascular runoff.  The medication was then injected after applying negative pressure. The patient tolerated the procedure well.    PAIN BEFORE THE PROCEDURE: 4/10.    PAIN AFTER THE PROCEDURE: 3/10.    The patient was monitored after the procedure.  Patient was given post procedure and discharge instructions to follow at home.  We will see the patient back in two weeks or the patient may call to inform of status. The patient was discharged in a stable condition.

## 2020-08-31 NOTE — DISCHARGE INSTRUCTIONS
Thank you for allowing us to care for you today. You may receive a survey about the care we provided. Your feedback is valuable and helps us provide excellent care throughout the community.     Home Care Instructions for Pain Management:    1. DIET:   You may resume your normal diet today.   2. BATHING:   You may shower with luke warm water. No tub baths or anything that will soak injection sites under water for the next 24 hours.  3. DRESSING:   You may remove your bandage today.   4. ACTIVITY LEVEL:   You may resume your normal activities 24 hrs after your procedure. Nothing strenuous today.  5. MEDICATIONS:   You may resume your normal medications today. To restart blood thinners, ask your doctor.  6. DRIVING    If you have received any sedatives by mouth today, you may not drive for 12 hours.    If you have received any sedation through your IV, you may not drive for 24 hrs.   7. SPECIAL INSTRUCTIONS:   No heat to the injection site for 24 hrs including, hot bath or shower, heating pad, moist heat, or hot tubs.    Use ice pack to injection site for any pain or discomfort.  Apply ice packs for 20 minute intervals as needed.    IF you have diabetes, be sure to monitor your blood sugar more closely. IF your injection contained steroids your blood sugar levels may become higher than normal.    If you are still having pain upon discharge:  Your pain may improve over the next 48 hours. The anesthetic (numbing medication) works immediately to 48 hours. IF your injection contained a steroid (anti-inflammatory medication), it takes approximately 3 days to start feeling relief and 7-10 days to see your greatest results from the medication. It is possible you may need subsequent injections. This would be discussed at your follow up appointment with pain management or your referring doctor.    Please call the PAIN MANAGEMENT office at [phone] or ON CALL pager at [phone] if you experienced any:   -Weakness or  loss of sensation  -Fever > 101.5  -Pain uncontrolled with oral medications   -Persistent nausea, vomiting, or diarrhea  -Redness or drainage from the injection sites, or any other worrisome concerns.   If physician on call was not reached or could not communicate with our office for any reason please go to the nearest emergency department.    Recovery After Procedural Sedation (Adult)   You have been given medicine by vein to make you sleep during your surgery. This may have included both a pain medicine and sleeping medicine. Most of the effects have worn off. But you may still have some drowsiness for the next 6 to 8 hours.  Home care  Follow these guidelines when you get home:  · For the next 8 hours, you should be watched by a responsible adult. This person should make sure your condition is not getting worse.  · Don't drink any alcohol for the next 24 hours.  · Don't drive, operate dangerous machinery, or make important business or personal decisions during the next 24 hours.  · To prevent injury or falls, use caution when standing and walking for at least 24 hours after your procedure.  Note: Your healthcare provider may tell you not to take any medicine by mouth for pain or sleep in the next 4 hours. These medicines may react with the medicines you were given in the hospital. This could cause a much stronger response than usual.  Follow-up care  Follow up with your healthcare provider if you are not alert and back to your usual level of activity within 12 hours.  When to seek medical advice  Call your healthcare provider right away if any of these occur:  · Drowsiness gets worse  · Weakness or dizziness gets worse  · Repeated vomiting  · You can't be awakened  · Fever  · New rash  [email or web address] last reviewed this educational content on 9/1/2019 © 2000-2020 [employer]. [address] [place] [address], [place] [address]. All rights reserved. This information is not intended as a substitute for professional  medical care. Always follow your healthcare professional's instructions.

## 2020-08-31 NOTE — DISCHARGE SUMMARY
Discharge Note  Short Stay      SUMMARY     Admit Date: 8/31/2020    Attending Physician: Tj Mayfield      Discharge Physician: Tj Mayfield      Discharge Date: 8/31/2020 11:22 AM    Procedure(s) (LRB):  INJECTION, STEROID, EPIDURAL, TRANSFORAMINAL,  APPROACH, L3-L4 and L4-L5 need consent (Right)    Final Diagnosis: Lumbar radiculopathy [M54.16]    Disposition: Home or self care    Patient Instructions:   Current Discharge Medication List      CONTINUE these medications which have NOT CHANGED    Details   albuterol (ACCUNEB) 1.25 mg/3 mL Nebu Take 3 mLs (1.25 mg total) by nebulization every 6 (six) hours as needed. Rescue  Qty: 1 Box, Refills: 11      albuterol (PROVENTIL HFA) 90 mcg/actuation inhaler Inhale 2 puffs into the lungs every 6 (six) hours as needed. Rescue  Qty: 20.1 g, Refills: 11      aspirin 81 mg Tab Take 81 mg by mouth every morning.       azelastine (ASTELIN) 137 mcg nasal spray 1 spray (137 mcg total) by Nasal route 2 (two) times daily.  Qty: 30 mL, Refills: 11    Associated Diagnoses: Nasal congestion      benzonatate (TESSALON) 100 MG capsule Take 1 capsule by mouth three times a day  Qty: 9 capsule, Refills: 0    Comments: Rx Discount Card: [facility] CARD- GROUP: [id number], BIN: [id number], ID: [id number] RxVoucher#[id number]      budesonide-formoterol 160-4.5 mcg (SYMBICORT) 160-4.5 mcg/actuation HFAA Inhale 2 puffs into the lungs every 12 (twelve) hours.  Qty: 3 Inhaler, Refills: 3    Associated Diagnoses: Chronic asthma, mild intermittent, uncomplicated      calcium citrate-vitamin D (CITRACAL + D) 315-200 mg-unit per tablet Take 1 tablet by mouth 2 (two) times daily.       erythromycin (ROMYCIN) ophthalmic ointment Apply to affected external eyelid tissue two times per day as directed for fourteen days  Qty: 3.5 g, Refills: 2    Associated Diagnoses: Dermatitis      fluconazole (DIFLUCAN) 100 MG tablet       fluticasone propionate (FLONASE) 50 mcg/actuation nasal spray 2 sprays (100 mcg total) by Each  Nostril route once daily.  Qty: 9.9 mL, Refills: 11      GUAIFENESIN (MUCINEX ORAL) Take 400 mg by mouth every 4 (four) hours as needed.       INV PROPULSID 10 MG Take 1 tablets (20 mg) by mouth 4 (four) times daily. FOR INVESTIGATIONAL USE ONLY. Protocol [id number]  Qty: 1440 each, Refills: 0    Associated Diagnoses: Patient in clinical research study; Gastroparesis      leflunomide (ARAVA) 20 MG Tab Take 1 tablet (20 mg total) by mouth once daily.  Qty: 90 tablet, Refills: 0    Associated Diagnoses: Rheumatoid arthritis, involving unspecified site, unspecified rheumatoid factor presence      lifitegrast (XIIDRA) 5 % Dpet Place 1 drop into both eyes 2 (two) times daily.  Qty: 60 each, Refills: 12      montelukast (SINGULAIR) 10 mg tablet Take 1 tablet (10 mg total) by mouth nightly.  Qty: 90 tablet, Refills: 3    Associated Diagnoses: Perennial allergic rhinitis      naproxen (NAPROSYN) 500 MG tablet Take 1 tablet (500 mg total) by mouth 2 (two) times daily as needed.  Qty: 180 tablet, Refills: 0      omeprazole (PRILOSEC) 40 MG capsule Take 1 capsule (40 mg total) by mouth every morning.  Qty: 30 capsule, Refills: 6      omeprazole-sodium bicarbonate (ZEGERID) 40-1.1 mg-gram per capsule Take 1 capsule by mouth every morning.  Qty: 90 capsule, Refills: 3      POTASSIUM ORAL Take by mouth once daily.      !! predniSONE (DELTASONE) 1 MG tablet TAKE 1 TABLET BY MOUTH EVERY DAY  Qty: 90 tablet, Refills: 1      !! predniSONE (DELTASONE) 5 MG tablet Take 5 mg by mouth once daily.      PREMARIN 0.625 mg tablet Take 0.625 mg by mouth once daily.  Refills: 4      PREMARIN vaginal cream PLACE 0.5 GRAM VAGINALLY 3 (THREE) TIMES A WEEK.  Qty: 30 g, Refills: 1    Associated Diagnoses: Vaginal atrophy; Recurrent UTI      progesterone (PROMETRIUM) 100 MG capsule Take 100 mg by mouth every morning. 1 Capsule Oral Every day, morning      RESTASIS 0.05 % ophthalmic emulsion PLACE 0.4 MLS (1 DROP TOTAL) INTO BOTH EYES 2 (TWO) TIMES  "DAILY.  Qty: 60 vial, Refills: 5    Associated Diagnoses: Bilateral dry eyes      rizatriptan (MAXALT) 10 MG tablet Take 10 mg by mouth as needed for Migraine.       rosuvastatin (CRESTOR) 20 MG tablet Take 1 tablet (20 mg total) by mouth every evening.  Qty: 90 tablet, Refills: 3    Associated Diagnoses: Coronary artery disease involving native coronary artery of native heart without angina pectoris      sarilumab (KEVZARA) 200 mg/1.14 mL Syrg Inject 1.14 mLs (200 mg total) into the skin every 14 (fourteen) days.  Qty: 2.28 mL, Refills: 2      sucralfate (CARAFATE) 1 gram tablet TAKE 1 TABLET (1 G TOTAL) BY MOUTH 4 (FOUR) TIMES DAILY.  Qty: 360 tablet, Refills: 3    Associated Diagnoses: Gastroparesis      syringe with needle (TUBERCULIN SYRINGE) 1 mL 27 x 1/2" Syrg To administer methotrexate 7.5mg sc once a week  Qty: 12 Syringe, Refills: 3    Associated Diagnoses: Rheumatoid arthritis involving multiple sites, unspecified rheumatoid factor presence      teriparatide (FORTEO) 20 mcg/dose - 600 mcg/2.4 mL PnIj Inject 0.08 mLs (20 mcg total) into the skin once daily.  Qty: 2.4 mL, Refills: 11    Associated Diagnoses: Post-menopausal osteoporosis; Steroid-induced osteoporosis; Stress fracture of neck of left femur      traMADol (ULTRAM) 50 mg tablet Take 1 tablet (50 mg total) by mouth every 24 hours as needed for Pain.  Qty: 7 tablet, Refills: 0    Comments: Quantity prescribed more than 7 day supply? No      trospium (SANCTURA) 20 mg Tab tablet TAKE 1 TABLET BY MOUTH TWICE A DAY. DUE FOR FOLLOW UP IN JULY  Qty: 180 tablet, Refills: 0    Comments: Patient needs an appointment  Associated Diagnoses: Urinary urgency; Increased frequency of urination       !! - Potential duplicate medications found. Please discuss with provider.              Discharge Diagnosis: Lumbar radiculopathy [M54.16]  Condition on Discharge: Stable with no complications to procedure   Diet on Discharge: Same as before.  Activity: as per " instruction sheet.  Discharge to: Home with a responsible adult.  Follow up: 2-4 weeks       Please call my office or pager at [phone] if experienced any weakness or loss of sensation, fever > 101.5, pain uncontrolled with oral medications, persistent nausea/vomiting/or diarrhea, redness or drainage from the incisions, or any other worrisome concerns. If physician on call was not reached or could not communicate with our office for any reason please go to the nearest emergency department

## 2020-09-02 ENCOUNTER — OFFICE VISIT (OUTPATIENT)
Dept: DERMATOLOGY | Facility: CLINIC | Age: 60
End: 2020-09-02
Payer: MEDICARE

## 2020-09-02 DIAGNOSIS — L82.1 SK (SEBORRHEIC KERATOSIS): Primary | ICD-10-CM

## 2020-09-02 DIAGNOSIS — L81.4 LENTIGO: ICD-10-CM

## 2020-09-02 DIAGNOSIS — D18.01 CHERRY ANGIOMA: ICD-10-CM

## 2020-09-02 DIAGNOSIS — B35.1 ONYCHOMYCOSIS: ICD-10-CM

## 2020-09-02 DIAGNOSIS — L30.9 HAND ECZEMA: ICD-10-CM

## 2020-09-02 PROCEDURE — 99214 PR OFFICE/OUTPT VISIT, EST, LEVL IV, 30-39 MIN: ICD-10-PCS | Mod: S$GLB,,, | Performed by: DERMATOLOGY

## 2020-09-02 PROCEDURE — 99214 OFFICE O/P EST MOD 30 MIN: CPT | Mod: S$GLB,,, | Performed by: DERMATOLOGY

## 2020-09-02 PROCEDURE — 99999 PR PBB SHADOW E&M-EST. PATIENT-LVL V: ICD-10-PCS | Mod: PBBFAC,,, | Performed by: DERMATOLOGY

## 2020-09-02 PROCEDURE — 99999 PR PBB SHADOW E&M-EST. PATIENT-LVL V: CPT | Mod: PBBFAC,,, | Performed by: DERMATOLOGY

## 2020-09-02 RX ORDER — CICLOPIROX 80 MG/ML
SOLUTION TOPICAL
Qty: 1 BOTTLE | Refills: 5 | Status: SHIPPED | OUTPATIENT
Start: 2020-09-02 | End: 2021-01-27

## 2020-09-02 RX ORDER — TRIAMCINOLONE ACETONIDE 1 MG/G
OINTMENT TOPICAL
Qty: 45 G | Refills: 1 | Status: SHIPPED | OUTPATIENT
Start: 2020-09-02 | End: 2021-01-27

## 2020-09-02 NOTE — PROGRESS NOTES
"  Subjective:       Patient ID:  Joyce Peterson is a 59 y.o. female who presents for   Chief Complaint   Patient presents with    Skin Check     Pt here today for a TBSE  Pt is on multiple immunosuppressives for RA  C/o cracked dry skin on fingers x months .  Tender. Splits. Uses hand moisturizer often and does not help.   C/o scaly lesions on face. Increasing in number . Would like removed  C/o abnormal toenails. Would like treatment     Patient is here today for a "mole" check.   Pt has a history of  extensive sun exposure in the past.   Pt recalls several blistering sunburns in the past- yes  Pt has history of tanning bed use- yes  Pt has  had moles removed in the past- yes, benign per   Pt has history of melanoma in first degree relatives-  Yes, father        Review of Systems   Skin: Positive for daily sunscreen use and activity-related sunscreen use. Negative for tendency to form keloidal scars.   Hematologic/Lymphatic: Bruises/bleeds easily.        Objective:    Physical Exam   Constitutional: She appears well-developed and well-nourished. No distress.   Neurological: She is alert and oriented to person, place, and time. She is not disoriented.   Psychiatric: She has a normal mood and affect.   Skin:   Areas Examined (abnormalities noted in diagram):   Scalp / Hair Palpated and Inspected  Head / Face Inspection Performed  Neck Inspection Performed  Chest / Axilla Inspection Performed  Abdomen Inspection Performed  Genitals / Buttocks / Groin Inspection Performed  Back Inspection Performed  RUE Inspected  LUE Inspection Performed  RLE Inspected  LLE Inspection Performed  Nails and Digits Inspection Performed                           Diagram Legend     Erythematous scaling macule/papule c/w actinic keratosis       Vascular papule c/w angioma      Pigmented verrucoid papule/plaque c/w seborrheic keratosis      Yellow umbilicated papule c/w sebaceous hyperplasia      Irregularly shaped tan macule c/w " lentigo     1-2 mm smooth white papules consistent with Milia      Movable subcutaneous cyst with punctum c/w epidermal inclusion cyst      Subcutaneous movable cyst c/w pilar cyst      Firm pink to brown papule c/w dermatofibroma      Pedunculated fleshy papule(s) c/w skin tag(s)      Evenly pigmented macule c/w junctional nevus     Mildly variegated pigmented, slightly irregular-bordered macule c/w mildly atypical nevus      Flesh colored to evenly pigmented papule c/w intradermal nevus       Pink pearly papule/plaque c/w basal cell carcinoma      Erythematous hyperkeratotic cursted plaque c/w SCC      Surgical scar with no sign of skin cancer recurrence      Open and closed comedones      Inflammatory papules and pustules      Verrucoid papule consistent consistent with wart     Erythematous eczematous patches and plaques     Dystrophic onycholytic nail with subungual debris c/w onychomycosis     Umbilicated papule    Erythematous-base heme-crusted tan verrucoid plaque consistent with inflamed seborrheic keratosis     Erythematous Silvery Scaling Plaque c/w Psoriasis     See annotation      Assessment / Plan:        SK (seborrheic keratosis)  These are benign inherited growths without a malignant potential. Reassurance given to patient. No treatment is necessary.     Onychomycosis  -     ciclopirox (PENLAC) 8 % Soln; Apply daily to affected nail. Must remove and restart weekly  Dispense: 1 Bottle; Refill: 5    Lentigo  This is a benign hyperpigmented sun induced lesion. Daily sun protection will reduce the number of new lesions. Treatment of these benign lesions are considered cosmetic.  The nature of sun-induced photo-aging and skin cancers is discussed.  Sun avoidance, protective clothing, and the use of 30-SPF sunscreens is advised. Observe closely for skin damage/changes, and call if such occurs.    Cherry angioma  These are benign vascular lesions that are inherited.  Treatment is not necessary.    Hand  eczema  Discussed with patient the importance of frequent hand moisturization q hour and q hand washing.  Recommended Neutrogena Original Norwegian Hand cream or Cerave Cream. Apply prescribed topical steroid followed by vaseline and white cotton glove or warm damp towel qhs prn severe flare.      -     triamcinolone acetonide 0.1% (KENALOG) 0.1 % ointment; aaa qhs , then vaseline and warm towel for flares. Not more than 2 weeks straight in same location. Avoid face and groin  Dispense: 45 g; Refill: 1    Total body skin examination performed today including at least 12 points as noted in physical examination. No lesions suspicious for malignancy noted.  Discussed increased risk of skin cancers secondary to immunosuppression.   Emphasized protective clothing, hat , sunglasses, and sunscreen.            Follow up in about 1 year (around 9/2/2021).

## 2020-09-03 ENCOUNTER — TELEPHONE (OUTPATIENT)
Dept: OTOLARYNGOLOGY | Facility: CLINIC | Age: 60
End: 2020-09-03

## 2020-09-03 DIAGNOSIS — K31.84 GASTROPARESIS: ICD-10-CM

## 2020-09-03 DIAGNOSIS — Z01.818 PRE-PROCEDURAL EXAMINATION: ICD-10-CM

## 2020-09-03 RX ORDER — SUCRALFATE 1 G/1
1 TABLET ORAL 4 TIMES DAILY
Qty: 360 TABLET | Refills: 3 | Status: SHIPPED | OUTPATIENT
Start: 2020-09-03 | End: 2021-09-16 | Stop reason: SDUPTHER

## 2020-09-03 NOTE — TELEPHONE ENCOUNTER
Called pt to schedule covid swab for 9/11 for pt's upcoming appointment on 9/14. Patient is confirmed for Friday 9/11 at 240p at the  in [place].

## 2020-09-08 DIAGNOSIS — J30.89 PERENNIAL ALLERGIC RHINITIS: ICD-10-CM

## 2020-09-08 RX ORDER — NAPROXEN 500 MG/1
500 TABLET ORAL 2 TIMES DAILY PRN
Qty: 180 TABLET | Refills: 0 | Status: SHIPPED | OUTPATIENT
Start: 2020-09-08 | End: 2020-11-16

## 2020-09-08 RX ORDER — MONTELUKAST SODIUM 10 MG/1
10 TABLET ORAL NIGHTLY
Qty: 90 TABLET | Refills: 3 | Status: SHIPPED | OUTPATIENT
Start: 2020-09-08 | End: 2021-10-18 | Stop reason: SDUPTHER

## 2020-09-10 RX ORDER — PREDNISONE 5 MG/1
5 TABLET ORAL DAILY
Qty: 90 TABLET | Refills: 1 | Status: SHIPPED | OUTPATIENT
Start: 2020-09-10 | End: 2021-01-27 | Stop reason: SDUPTHER

## 2020-09-11 ENCOUNTER — LAB VISIT (OUTPATIENT)
Dept: URGENT CARE | Facility: CLINIC | Age: 60
End: 2020-09-11
Payer: MEDICARE

## 2020-09-11 VITALS — TEMPERATURE: 98 F

## 2020-09-11 DIAGNOSIS — Z01.818 PRE-PROCEDURAL EXAMINATION: ICD-10-CM

## 2020-09-11 PROCEDURE — U0003 INFECTIOUS AGENT DETECTION BY NUCLEIC ACID (DNA OR RNA); SEVERE ACUTE RESPIRATORY SYNDROME CORONAVIRUS 2 (SARS-COV-2) (CORONAVIRUS DISEASE [COVID-19]), AMPLIFIED PROBE TECHNIQUE, MAKING USE OF HIGH THROUGHPUT TECHNOLOGIES AS DESCRIBED BY CMS-2020-01-R: HCPCS

## 2020-09-13 LAB — SARS-COV-2 RNA RESP QL NAA+PROBE: NOT DETECTED

## 2020-09-14 ENCOUNTER — OFFICE VISIT (OUTPATIENT)
Dept: OTOLARYNGOLOGY | Facility: CLINIC | Age: 60
End: 2020-09-14
Payer: MEDICARE

## 2020-09-14 VITALS
SYSTOLIC BLOOD PRESSURE: 122 MMHG | BODY MASS INDEX: 29.97 KG/M2 | WEIGHT: 158.63 LBS | DIASTOLIC BLOOD PRESSURE: 78 MMHG | HEART RATE: 107 BPM

## 2020-09-14 DIAGNOSIS — J31.0 CHRONIC RHINITIS: Primary | ICD-10-CM

## 2020-09-14 DIAGNOSIS — M35.01 SJOGREN'S SYNDROME WITH KERATOCONJUNCTIVITIS SICCA: ICD-10-CM

## 2020-09-14 DIAGNOSIS — J38.2 VOCAL CORD NODULE: ICD-10-CM

## 2020-09-14 DIAGNOSIS — R49.0 DYSPHONIA: ICD-10-CM

## 2020-09-14 PROCEDURE — 99214 PR OFFICE/OUTPT VISIT, EST, LEVL IV, 30-39 MIN: ICD-10-PCS | Mod: 25,S$GLB,, | Performed by: OTOLARYNGOLOGY

## 2020-09-14 PROCEDURE — 31575 DIAGNOSTIC LARYNGOSCOPY: CPT | Mod: S$GLB,,, | Performed by: OTOLARYNGOLOGY

## 2020-09-14 PROCEDURE — 99999 PR PBB SHADOW E&M-EST. PATIENT-LVL IV: CPT | Mod: PBBFAC,,, | Performed by: OTOLARYNGOLOGY

## 2020-09-14 PROCEDURE — 3008F BODY MASS INDEX DOCD: CPT | Mod: CPTII,S$GLB,, | Performed by: OTOLARYNGOLOGY

## 2020-09-14 PROCEDURE — 3008F PR BODY MASS INDEX (BMI) DOCUMENTED: ICD-10-PCS | Mod: CPTII,S$GLB,, | Performed by: OTOLARYNGOLOGY

## 2020-09-14 PROCEDURE — 99214 OFFICE O/P EST MOD 30 MIN: CPT | Mod: 25,S$GLB,, | Performed by: OTOLARYNGOLOGY

## 2020-09-14 PROCEDURE — 31575 PR LARYNGOSCOPY, FLEXIBLE; DIAGNOSTIC: ICD-10-PCS | Mod: S$GLB,,, | Performed by: OTOLARYNGOLOGY

## 2020-09-14 PROCEDURE — 99999 PR PBB SHADOW E&M-EST. PATIENT-LVL IV: ICD-10-PCS | Mod: PBBFAC,,, | Performed by: OTOLARYNGOLOGY

## 2020-09-14 RX ORDER — AZELASTINE 1 MG/ML
1 SPRAY, METERED NASAL 2 TIMES DAILY
Qty: 30 ML | Refills: 0 | Status: SHIPPED | OUTPATIENT
Start: 2020-09-14 | End: 2021-01-27

## 2020-09-14 NOTE — PROGRESS NOTES
Chief Complaint   Patient presents with    Follow-up    Hoarse     improved    Cough     slight improvement   .    HPI:     Joyce Peterson is a 59 y.o. female who is referred by Dr. Becca Peters MD for evaluation of 2 week history of increased  nasal congestion and postnasal drip. She states that she most recently was treated with doxycycline 100mg PO BID for 7 days without relief. She states that she has had a lifelong history of nasal congestion and allergies.  She states that she had positive allergy testing in the past. She has had immunotherapy in the past.  She notes that she will have 3-4 exacerbations of her nasal symptoms per year that takes 2-3 months to resolve. She also has a history of asthma and finds that her asthma is worse when she is having sinus issues.   She describes difficulty breathing at night. There is bilateral nasal obstruction. She does use sinus rinses or nasal sprays. She has been on  singulair, alavert, albuterol, symbicort, astelin, flonase.  She admits to midface pain and pressure.  She admits to rhinorrhea and postnasal drip. There is not maxillary tooth pain. She  admits to headaches.  She has not had sinus or nasal surgery. There is no history of sinonasal trauma.      Interval HPI 2/17/2020:  Mrs. Peterson follows up today for chronic cough, chronic rhinitis, and chronic sinus. She reports since her last visit here she was additionally diagnosed with bronchitis. She was seen by Dr. Cline and ws treated with doxycycline.  She reports that this helped somewhat.  She reports that she is still having cough. She notes nasal congestion, post-nasal drip, productive cough, headaches int he frontal region. She is on immunosuppressive medications for erosive RA. With recent low IgG and IgM levels. She does feel that her Flonase is helpful for her nasal symptoms.  She has avoided antihistamines as they severely dry her eyes(Sjogren's/SICCA). She does feel they are  helpful when she does take them on occasion. She also has a significant history of GERD and gastroparesis. She is currently on cisapride and zegarid for this.     Interval HPI 6/17/2020:  Mrs. Peterson follows up today for chronic cough, chronic rhinitis. She notes that she did have increased cough over the last week. Noted associated wheezing.  She states that she has been using her symbicort and had used the albuterol for one week. She thinks that that helped.  She relays that now she seems more hoarse but thinks this is due to the increased cough.  She states that she has not been able to tolerate medications for rhinitis secondary to feeling that it dries her eyes out too much secondary to Sjogren's.     Interval HPI 9/14/2020:  Follow up for chronic rhinitis, dysphonia, vocal cord nodules.  She reports that she has completed her speech therapy sessions.  She feels that this has been extremely helpful.  She reports that her voice no longer has a rough quality.  She feels her voice is more baseline.  She has had increased nasal congestion, sneezing, and rhinorrhea.  She has been on Flonase and feels this helps somewhat.  She states that she has not been able to use any antihistamines due to the drying effect it has on her eyes which Sjogren's disorder.      Past Medical History:   Diagnosis Date    Acid reflux     Allergy     Anemia     Asthma     Coronary artery disease     Degenerative disc disease     Dry eyes     Dry mouth     Gastroparesis     Hyperlipidemia     Lateral meniscus derangement 4/6/2016    Lobular carcinoma in situ     Osteoarthritis     Osteoporosis     Rheumatoid arthritis(714.0)     Umbilical hernia 8/13/2015     Social History     Socioeconomic History    Marital status:      Spouse name: Not on file    Number of children: Not on file    Years of education: Not on file    Highest education level: Not on file   Occupational History    Not on file   Social Needs     Financial resource strain: Not on file    Food insecurity     Worry: Not on file     Inability: Not on file    Transportation needs     Medical: Not on file     Non-medical: Not on file   Tobacco Use    Smoking status: Never Smoker    Smokeless tobacco: Never Used   Substance and Sexual Activity    Alcohol use: Yes     Comment: occasionally    Drug use: No    Sexual activity: Yes     Partners: Male     Birth control/protection: Surgical   Lifestyle    Physical activity     Days per week: Not on file     Minutes per session: Not on file    Stress: Not on file   Relationships    Social connections     Talks on phone: Not on file     Gets together: Not on file     Attends [employer] service: Not on file     Active member of club or organization: Not on file     Attends meetings of clubs or organizations: Not on file     Relationship status: Not on file   Other Topics Concern    Are you pregnant or think you may be? Not Asked    Breast-feeding Not Asked   Social History Narrative    Not on file     Past Surgical History:   Procedure Laterality Date    BREAST BIOPSY Left 01/29/2002    core bx    CARPAL TUNNEL RELEASE Right 05/2017    CHOLECYSTECTOMY  2004    COLONOSCOPY      10/11    COLONOSCOPY N/A 6/29/2017    Procedure: COLONOSCOPY;  Surgeon: López Moore MD;  Location: [address]);  Service: Endoscopy;  Laterality: N/A;    INJECTION OF FACET JOINT Bilateral 3/12/2020    Procedure: FACET JOINT INJECTION (LUMBAR BLOCK) BILATERAL L4-5 AND L5-S1 DIRECT REFERRAL;  Surgeon: Tj Mayfield MD;  [facility];  Service: Pain Management;  Laterality: Bilateral;  NEEDS CONSENT    INJECTION OF JOINT Right 7/30/2020    Procedure: INJECTION, JOINT, RIGHT HIP Interarticular under flouro;  Surgeon: Tj Mayfield MD;  Location: [facility] PAIN T;  Service: Pain Management;  Laterality: Right;  INJECTION, JOINT, RIGHT HIP Interarticular under flouro    INTRAMEDULLARY RODDING OF FEMUR Left  5/21/2019    Procedure: INSERTION, INTRAMEDULLARY ARIEL, FEMUR;  Surgeon: López Duff MD;  Location: [facility] OR 2ND FLR;  Service: Orthopedics;  Laterality: Left;    REPAIR OF TRICEPS TENDON Left 10/17/2018    Procedure: REPAIR, TENDON, TRICEPS left elbow;  Surgeon: Staci Yarbrough MD;  Location: [facility] OR 1ST FLR;  Service: Orthopedics;  Laterality: Left;  Anesthesia: General and regional. PRONE, k-wire , hand pan 1 and pan 2, CALL ARTHREX/Tamera notified 10-12 LO    TONSILLECTOMY      TRANSFORAMINAL EPIDURAL INJECTION OF STEROID Right 8/31/2020    Procedure: INJECTION, STEROID, EPIDURAL, TRANSFORAMINAL,  APPROACH, L3-L4 and L4-L5 need consent;  Surgeon: Tj Mayfield MD;  Location: [facility];  Service: Pain Management;  Laterality: Right;    TUBAL LIGATION  2003    UPPER GASTROINTESTINAL ENDOSCOPY      10/11    uterine ablation  2003     Family History   Problem Relation Age of Onset    Cancer Mother         Lung Cancer    Emphysema Mother     Heart attack Mother     Cancer Father         Lung Cancer    Osteoarthritis Father     Lung cancer Father     Skin cancer Father     Heart disease Brother     Heart attack Brother     Osteoarthritis Paternal Aunt     Retinal detachment Maternal Aunt     No Known Problems Sister     No Known Problems Maternal Uncle     No Known Problems Paternal Uncle     No Known Problems Maternal Grandmother     No Known Problems Maternal Grandfather     No Known Problems Paternal Grandmother     No Known Problems Paternal Grandfather     Colon cancer Neg Hx     Esophageal cancer Neg Hx     Stomach cancer Neg Hx     Celiac disease Neg Hx     Diabetes Neg Hx     Thyroid disease Neg Hx     Amblyopia Neg Hx     Blindness Neg Hx     Cataracts Neg Hx     Glaucoma Neg Hx     Hypertension Neg Hx     Macular degeneration Neg Hx     Strabismus Neg Hx     Stroke Neg Hx            Review of Systems  General: negative for chills, fever or weight  loss  Psychological: negative for mood changes or depression  Ophthalmic: negative for blurry vision, photophobia or eye pain  ENT: see HPI  Respiratory: no cough, shortness of breath, or wheezing  Cardiovascular: no chest pain or dyspnea on exertion  Gastrointestinal: no abdominal pain, change in bowel habits, or black/ bloody stools  Musculoskeletal: negative for gait disturbance or muscular weakness  Neurological: no syncope or seizures; no ataxia  Dermatological: negative for puritis,  rash and jaundice  Hematologic/lymphatic: no easy bruising, no new lumps or bumps      Physical Exam:    Vitals:    09/14/20 1503   BP: 122/78   Pulse: 107       Constitutional: Well appearing / communicating without difficutly.  NAD.  Eyes: EOM I Bilaterally  Head/Face: Normocephalic.  Negative paranasal sinus pressure/tenderness.  Salivary glands WNL.  House Brackmann I Bilaterally.    Right Ear: Auricle normal appearance. External Auditory Canal within normal limits,TM w/o masses/lesions/perforations. TM mobility noted.   Left Ear: Auricle normal appearance. External Auditory Canal WNL,TM w/o masses/lesions/perforations. TM mobility noted.  Nose: Nasal septal deviation to the left. Inferior Turbinates 3+ bilaterally. No septal perforation. No masses/lesions. External nasal skin appears normal without masses/lesions.  Oral Cavity: Gingiva/lips within normal limits.  Dentition/gingiva healthy appearing. Mucus membranes moist. Floor of mouth soft, no masses palpated. Oral Tongue mobile. Hard Palate appears normal.    Oropharynx: Base of tongue appears normal. No masses/lesions noted. Tonsillar fossa/pharyngeal wall without lesions. Posterior oropharynx WNL.  Soft palate without masses. Midline uvula.   Neck/Lymphatic: No LAD I-VI bilaterally.  No thyromegaly.  No masses noted on exam.    Mirror laryngoscopy/nasopharyngoscopy: Active gag reflex.  Unable to perform.    Neuro/Psychiatric: AOx3.  Normal mood and affect.    Cardiovascular: Normal carotid pulses bilaterally, no increasing jugular venous distention noted at cervical region bilaterally.    Respiratory: Normal respiratory effort, no stridor, no retractions noted.    See separate procedure flexible fiberoptic laryngoscopy.    Diagnostic testing reviewed:  ImmunoCAP testing reviewed- negative    Results for TONNY GOMEZ (MRN [id number]) as of 2/17/2020 17:57   Ref. Range 7/20/2018 13:58 11/13/2018 09:47   IgG - Serum Latest Ref Range: 650 - 1600 mg/dL  666   IgM Latest Ref Range: 50 - 300 mg/dL  30 (L)   IgA Latest Ref Range: 40 - 350 mg/dL  121   IgG 1 Latest Ref Range: 382 - 929 mg/dL  367 (L)   IgG 2 Latest Ref Range: 242 - 700 mg/dL  266   IgG 3 Latest Ref Range: 22 - 176 mg/dL  37   IgG 4 Latest Ref Range: 4 - 86 mg/dL  5       Ct sinus 1/24/2020: Images reviewed and interpreted by myself and reviewed in detail with patient.  Findings of mild patchy ethmoid mucosal thickening. The frontal, sphenoid, and maxillary sinuses are clear. NO bony changes to suggest chronic sinus inflammation.      Assessment:    ICD-10-CM ICD-9-CM    1. Chronic rhinitis  J31.0 472.0    2. Vocal cord nodule  J38.2 478.5    3. Dysphonia  R49.0 784.42    4. Sjogren's syndrome with keratoconjunctivitis sicca  M35.01 710.2      The primary encounter diagnosis was Chronic rhinitis. Diagnoses of Vocal cord nodule, Dysphonia, and Sjogren's syndrome with keratoconjunctivitis sicca were also pertinent to this visit.      Plan:  No orders of the defined types were placed in this encounter.  Continue nasal saline rinses BID  Continue Flonase 2 sprays per nostril BID  Start Astelin 1 spray per nostril daily.   Reassurance that I do not see evidence of sinusitis today.      Vocal cord nodules improved with speech therapy. Recommend that patient continued to implement learned techniques from SLP.     Rachelle Mcguire MD

## 2020-09-15 ENCOUNTER — TELEPHONE (OUTPATIENT)
Dept: PHARMACY | Facility: CLINIC | Age: 60
End: 2020-09-15

## 2020-09-15 ENCOUNTER — TELEPHONE (OUTPATIENT)
Dept: PAIN MEDICINE | Facility: CLINIC | Age: 60
End: 2020-09-15

## 2020-09-15 NOTE — PROCEDURES
Procedures       Due to indication in patient's history, presentation or risk factors,  a fiber optic exam was performed.    SEPARATE PROCEDURE NOTE:    ANESTHESIA:  Topical xylocaine with coleman-synephrine    FINDINGS:  Bilateral true vocal fold nodules improved.      PROCEDURE:  After verbal consent was obtained, the flexible scope was passed through the patient's nasal cavity without difficulty.  The nasopharynx (adenoid pad) and eustachian tube orifices were first visualized and were found to be normal, without masses or irregularity.  The posterior pharyngeal wall and base of tongue were then examined and no mass or irregular tissue was seen.  The scope was then advanced to the larynx, and the epiglottis, valleculae, and piriform sinuses were normal, without masses or mucosal irregularity.  The false vocal folds and true vocal folds were then examined and were found to have normal mobility (full abduction and adduction) and no masses or mucosal irregularity was seen. Bilateral vocal cord nodules improved.  The arytenoids and the interarytenoid area were normal. The patient tolerated the procedure well without complications.

## 2020-09-15 NOTE — TELEPHONE ENCOUNTER
Attempted to reach patient regarding Kevzara follow up and Kevzara & Forteo refill - LV for call back.

## 2020-09-15 NOTE — TELEPHONE ENCOUNTER
Staff called to confirm 9/16/20 1:20 pm in office visit with Valarie Law Np. Patient informed of instructions.    Patient did not answer therefore staff left a detailed voice message informing the patient of the above information.

## 2020-09-16 ENCOUNTER — OFFICE VISIT (OUTPATIENT)
Dept: PAIN MEDICINE | Facility: CLINIC | Age: 60
End: 2020-09-16
Payer: MEDICARE

## 2020-09-16 VITALS
WEIGHT: 157.44 LBS | HEIGHT: 61 IN | TEMPERATURE: 99 F | BODY MASS INDEX: 29.72 KG/M2 | SYSTOLIC BLOOD PRESSURE: 146 MMHG | HEART RATE: 116 BPM | DIASTOLIC BLOOD PRESSURE: 89 MMHG

## 2020-09-16 DIAGNOSIS — M47.9 OSTEOARTHRITIS OF SPINE, UNSPECIFIED SPINAL OSTEOARTHRITIS COMPLICATION STATUS, UNSPECIFIED SPINAL REGION: Primary | ICD-10-CM

## 2020-09-16 DIAGNOSIS — M16.11 PRIMARY OSTEOARTHRITIS OF RIGHT HIP: ICD-10-CM

## 2020-09-16 DIAGNOSIS — M54.17 LUMBOSACRAL RADICULOPATHY: ICD-10-CM

## 2020-09-16 DIAGNOSIS — M47.819 SPONDYLOSIS WITHOUT MYELOPATHY: ICD-10-CM

## 2020-09-16 DIAGNOSIS — M16.11 OSTEOARTHRITIS OF RIGHT HIP, UNSPECIFIED OSTEOARTHRITIS TYPE: ICD-10-CM

## 2020-09-16 PROCEDURE — 99213 OFFICE O/P EST LOW 20 MIN: CPT | Mod: S$GLB,,, | Performed by: NURSE PRACTITIONER

## 2020-09-16 PROCEDURE — 99999 PR PBB SHADOW E&M-EST. PATIENT-LVL V: CPT | Mod: PBBFAC,,, | Performed by: NURSE PRACTITIONER

## 2020-09-16 PROCEDURE — 3008F BODY MASS INDEX DOCD: CPT | Mod: CPTII,S$GLB,, | Performed by: NURSE PRACTITIONER

## 2020-09-16 PROCEDURE — 3008F PR BODY MASS INDEX (BMI) DOCUMENTED: ICD-10-PCS | Mod: CPTII,S$GLB,, | Performed by: NURSE PRACTITIONER

## 2020-09-16 PROCEDURE — 99213 PR OFFICE/OUTPT VISIT, EST, LEVL III, 20-29 MIN: ICD-10-PCS | Mod: S$GLB,,, | Performed by: NURSE PRACTITIONER

## 2020-09-16 PROCEDURE — 99999 PR PBB SHADOW E&M-EST. PATIENT-LVL V: ICD-10-PCS | Mod: PBBFAC,,, | Performed by: NURSE PRACTITIONER

## 2020-09-16 RX ORDER — DICLOFENAC SODIUM 10 MG/G
GEL TOPICAL
COMMUNITY
Start: 2020-09-03 | End: 2021-01-27 | Stop reason: SDUPTHER

## 2020-09-16 NOTE — PROGRESS NOTES
Chronic Pain-Established Note (Follow up visit)         SUBJECTIVE:    Interval History 9/16/2020:  Patient is here today for follow-up of right-sided lower back, hip, and leg pain.  She is now s/p right L3/4 and L4/5 TF MADELINE with about 60% relief of severe leg pain.  However, she continues with significant right hip and groin pain.  She plans to make a follow-up with orthopedics at home would.  Additionally, she continues with left posterior foot pain for which she is followed by Dr. Steele.  She is currently in physical therapy twice weekly for her hip.  Her pain today is 7/10.  She denies any recent changes in respiratory status such as fever, cough, or shortness of breath.    Previous Encounter:  Joyce Peterson presents to the clinic for a follow-up appointment for right sided back and hip pain.  She is now s/p right hip injection with no relief, not even short term.  She denies any respiratory changes after the procedure including fever, cough, or SOB.  She did have some relief with lumbar facet injections for back pain in the past.  Her biggest complaint today is right sided back and lateral hip pain with radiation into the front and side of the hip, stopping at the knee.  She denies radiation past the knee at this time.  She denies symptoms on the left. Since the last visit, Joyce Peterson states the pain has been worsening. Current pain intensity is 7/10.    Pain Disability Index Review:  Last 3 PDI Scores 9/16/2020 6/17/2020 8/13/2014   Pain Disability Index (PDI) 61 [phone] 47       Pain Medications:  Tramadol and Naproxen    Opioid Contract: no     report:  Not applicable    Pain Procedures:   3/12/20 Bilateral L4/5 and L5/S1 facet injection- significant benefit of back pain  7/30/20 Right hip injection- no relief   8/31/20 Right right L3/4 and L4/5 TF MADELINE- 60% relief of leg pain    Physical Therapy/Home Exercise: yes    Imaging:     Narrative & Impression     EXAMINATION:  MRI HIP WITHOUT CONTRAST  RIGHT     CLINICAL HISTORY:  Hip pain, acute, fracture suspected, neg xray or equivocal (Age => 50y);concern for stress fracture of femur with strong history of this previous.;  Pain in right hip     TECHNIQUE:  MRI right hip performed without contrast.     COMPARISON:  Radiographs 07/20/2020     FINDINGS:  Bone marrow signal is maintained.  No fracture or infiltrative process.     There is tear of the anterior to superior acetabular labrum.  No paralabral cyst.  Ligamentum teres is attenuated.  There is chondral fissuring over the superolateral femoral head and acetabulum.  No significant joint effusion.     There is tendinosis of the gluteus minimus and medius.  No iliopsoas or trochanteric bursitis.     Note made of left hip gamma nail.     Limited assessment of pelvic soft tissues demonstrates a small uterine fibroid.  No pelvic ascites or lymphadenopathy.     Impression:     1. No fracture or marrow infiltrative process.  2. Degenerative changes of the right hip with tear of the anterior to superior acetabular labrum.     Narrative & Impression     EXAMINATION:  MRI LUMBAR SPINE WITHOUT CONTRAST     CLINICAL HISTORY:  severe acute low back pain; Low back pain     TECHNIQUE:  Multiplanar, multisequence MR images were acquired from the thoracolumbar junction to the sacrum without contrast.     COMPARISON:  MRI of the lumbar spine from 02/20/2017.  Correlation is made to prior radiographs of the lumbar spine from 02/28/2020.     FINDINGS:  Alignment: There is grade 1 anterolisthesis of L4 on L5.  Alignment is otherwise anatomic.     Vertebrae: There is diffuse bone marrow signal heterogeneity with several hemangiomas.  No evidence for marrow infiltrative process or recent fracture.     Discs: There is multilevel disc desiccation.  Mild disc height loss noted at L4-L5.     Cord: Normal.  Conus terminates at L1.     Degenerative findings:     T12-L1: No spinal canal stenosis or neural foraminal narrowing.     L1-L2:  Circumferential disc bulge with a left paracentral disc protrusion.  No spinal canal stenosis or neural foraminal narrowing.     L2-L3: Circumferential disc bulge with mild bilateral facet arthropathy and ligamentum flavum hypertrophy resulting in mild left neural foraminal narrowing.  No spinal canal stenosis.     L3-L4: Circumferential disc bulge with mild bilateral facet arthropathy and ligamentum flavum hypertrophy.  No spinal canal stenosis or neural foraminal narrowing.     L4-L5: Grade 1 anterolisthesis of L4 on L5 with a circumferential disc bulge and superimposed central disc extrusion migrating superiorly and severe bilateral facet arthropathy and ligamentum flavum hypertrophy with several small synovial cysts posteriorly result in severe spinal canal stenosis and moderate left, mild right neural foraminal narrowing.     L5-S1: There is prominent epidural fat effacing the thecal sac.  There is a circumferential disc bulge with mild bilateral facet arthropathy resulting in mild left neural foraminal narrowing.     Paraspinal muscles & soft tissues: Unremarkable.     Impression:     1. Multilevel degenerative changes of the lumbar spine, most significant at the level of L4-L5 where there is a disc extrusion contributing to severe spinal canal stenosis and moderate left and mild right neural foraminal narrowing.         Allergies:   Review of patient's allergies indicates:   Allergen Reactions    Actemra [tocilizumab] Other (See Comments)     Severe dizziness    Codeine Nausea And Vomiting    Gold au 198 Hives and Rash    Hydroxychloroquine Other (See Comments)     Can't remember the reaction      Iodinated contrast media Other (See Comments)     Other reaction(s): BURNING ALL OVER    Iodine Other (See Comments)     Other reaction(s): BURNING ALL OVER - Iodine dye - Not topical    Sulfa (sulfonamide antibiotics) Other (See Comments)     Can't remember the reaction    Zofran [ondansetron hcl (pf)]  "Nausea And Vomiting     Pt reports that last time she received zofran she started vomiting again    Methotrexate analogues Nausea Only    Pneumovax 23 [pneumococcal 23-argenis ps vaccine] Other (See Comments)     "sick"       Current Medications:   Current Outpatient Medications   Medication Sig Dispense Refill    albuterol (ACCUNEB) 1.25 mg/3 mL Nebu Take 3 mLs (1.25 mg total) by nebulization every 6 (six) hours as needed. Rescue 1 Box 11    albuterol (PROVENTIL HFA) 90 mcg/actuation inhaler Inhale 2 puffs into the lungs every 6 (six) hours as needed. Rescue 20.1 g 11    aspirin 81 mg Tab Take 81 mg by mouth every morning.       azelastine (ASTELIN) 137 mcg (0.1 %) nasal spray 1 spray (137 mcg total) by Nasal route 2 (two) times daily. 30 mL 0    benzonatate (TESSALON) 100 MG capsule Take 1 capsule by mouth three times a day 9 capsule 0    budesonide-formoterol 160-4.5 mcg (SYMBICORT) 160-4.5 mcg/actuation HFAA Inhale 2 puffs into the lungs every 12 (twelve) hours. 3 Inhaler 3    calcium citrate-vitamin D (CITRACAL + D) 315-200 mg-unit per tablet Take 1 tablet by mouth 2 (two) times daily.       ciclopirox (PENLAC) 8 % Soln Apply daily to affected nail. Must remove and restart weekly 1 Bottle 5    diclofenac sodium (VOLTAREN) 1 % Gel APPLY 2 GRAMS TOPICALLY 4 (FOUR) TIMES DAILY AS NEEDED.      erythromycin (ROMYCIN) ophthalmic ointment Apply to affected external eyelid tissue two times per day as directed for fourteen days 3.5 g 2    fluconazole (DIFLUCAN) 100 MG tablet       fluticasone propionate (FLONASE) 50 mcg/actuation nasal spray 2 sprays (100 mcg total) by Each Nostril route once daily. 9.9 mL 11    GUAIFENESIN (MUCINEX ORAL) Take 400 mg by mouth every 4 (four) hours as needed.       INV PROPULSID 10 MG Take 1 tablets (20 mg) by mouth 4 (four) times daily. FOR INVESTIGATIONAL USE ONLY. Protocol CIS-USA-154 1440 each 0    leflunomide (ARAVA) 20 MG Tab Take 1 tablet (20 mg total) by mouth once " "daily. 90 tablet 0    lifitegrast (XIIDRA) 5 % Dpet Place 1 drop into both eyes 2 (two) times daily. 60 each 12    montelukast (SINGULAIR) 10 mg tablet Take 1 tablet (10 mg total) by mouth nightly. 90 tablet 3    naproxen (NAPROSYN) 500 MG tablet Take 1 tablet (500 mg total) by mouth 2 (two) times daily as needed. 180 tablet 0    omeprazole (PRILOSEC) 40 MG capsule Take 1 capsule (40 mg total) by mouth every morning. 30 capsule 6    omeprazole-sodium bicarbonate (ZEGERID) 40-1.1 mg-gram per capsule Take 1 capsule by mouth every morning. 90 capsule 3    POTASSIUM ORAL Take by mouth once daily.      predniSONE (DELTASONE) 1 MG tablet TAKE 1 TABLET BY MOUTH EVERY DAY 90 tablet 1    predniSONE (DELTASONE) 5 MG tablet Take 1 tablet (5 mg total) by mouth once daily. 90 tablet 1    PREMARIN 0.625 mg tablet Take 0.625 mg by mouth once daily.  4    PREMARIN vaginal cream PLACE 0.5 GRAM VAGINALLY 3 (THREE) TIMES A WEEK. 30 g 1    progesterone (PROMETRIUM) 100 MG capsule Take 100 mg by mouth every morning. 1 Capsule Oral Every day, morning      RESTASIS 0.05 % ophthalmic emulsion PLACE 0.4 MLS (1 DROP TOTAL) INTO BOTH EYES 2 (TWO) TIMES DAILY. 60 vial 5    rizatriptan (MAXALT) 10 MG tablet Take 10 mg by mouth as needed for Migraine.       rosuvastatin (CRESTOR) 20 MG tablet Take 1 tablet (20 mg total) by mouth every evening. 90 tablet 3    sarilumab (KEVZARA) 200 mg/1.14 mL Syrg Inject 1.14 mLs (200 mg total) into the skin every 14 (fourteen) days. 2.28 mL 2    sucralfate (CARAFATE) 1 gram tablet Take 1 tablet (1 g total) by mouth 4 (four) times daily. 360 tablet 3    syringe with needle (TUBERCULIN SYRINGE) 1 mL 27 x 1/2" Syrg To administer methotrexate 7.5mg sc once a week 12 Syringe 3    teriparatide (FORTEO) 20 mcg/dose - 600 mcg/2.4 mL PnIj Inject 0.08 mLs (20 mcg total) into the skin once daily. 2.4 mL 11    traMADol (ULTRAM) 50 mg tablet Take 1 tablet (50 mg total) by mouth every 24 hours as needed " for Pain. 7 tablet 0    triamcinolone acetonide 0.1% (KENALOG) 0.1 % ointment aaa qhs , then vaseline and warm towel for flares. Not more than 2 weeks straight in same location. Avoid face and groin 45 g 1    trospium (SANCTURA) 20 mg Tab tablet TAKE 1 TABLET BY MOUTH TWICE A DAY. DUE FOR FOLLOW UP IN JULY 180 tablet 0     Current Facility-Administered Medications   Medication Dose Route Frequency Provider Last Rate Last Dose    betamethasone acetate-betamethasone sodium phosphate injection 6 mg  6 mg Intra-articular Once Tj Mayfield MD         Facility-Administered Medications Ordered in Other Visits   Medication Dose Route Frequency Provider Last Rate Last Dose    0.9%  NaCl infusion   Intravenous Continuous Callum iSnger MD   Stopped at 05/21/19 1100       REVIEW OF SYSTEMS:    GENERAL:  No weight loss, malaise or fevers.  HEENT:  Negative for frequent or significant headaches.  NECK:  Negative for lumps, goiter, pain and significant neck swelling.  RESPIRATORY:  Negative for cough, wheezing or shortness of breath. Asthma.  CARDIOVASCULAR:  Negative for chest pain, leg swelling or palpitations. CAD.  GI:  Negative for abdominal discomfort, blood in stools or black stools or change in bowel habits.  MUSCULOSKELETAL:  See HPI.  SKIN:  Negative for lesions, rash, and itching.  PSYCH:  Negative for sleep disturbance, mood disorder and recent psychosocial stressors.  HEMATOLOGY/LYMPHOLOGY:  Negative for prolonged bleeding, bruising easily or swollen nodes.  NEURO:   No history of headaches, syncope, paralysis, seizures or tremors.  All other reviewed and negative other than HPI.    Past Medical History:  Past Medical History:   Diagnosis Date    Acid reflux     Allergy     Anemia     Asthma     Coronary artery disease     Degenerative disc disease     Dry eyes     Dry mouth     Gastroparesis     Hyperlipidemia     Lateral meniscus derangement 4/6/2016    Lobular carcinoma in situ      Osteoarthritis     Osteoporosis     Rheumatoid arthritis(714.0)     Umbilical hernia 8/13/2015       Past Surgical History:  Past Surgical History:   Procedure Laterality Date    BREAST BIOPSY Left 01/29/2002    core bx    CARPAL TUNNEL RELEASE Right 05/2017    CHOLECYSTECTOMY  2004    COLONOSCOPY      10/11    COLONOSCOPY N/A 6/29/2017    Procedure: COLONOSCOPY;  Surgeon: López Moore MD;  Location: [facility] (4TH FLR);  Service: Endoscopy;  Laterality: N/A;    INJECTION OF FACET JOINT Bilateral 3/12/2020    Procedure: FACET JOINT INJECTION (LUMBAR BLOCK) BILATERAL L4-5 AND L5-S1 DIRECT REFERRAL;  Surgeon: Tj Mayfield MD;  Location: [facility] PAIN MGT;  Service: Pain Management;  Laterality: Bilateral;  NEEDS CONSENT    INJECTION OF JOINT Right 7/30/2020    Procedure: INJECTION, JOINT, RIGHT HIP Interarticular under flouro;  Surgeon: Tj Mayfield MD;  Location: [facility] PAIN MGT;  Service: Pain Management;  Laterality: Right;  INJECTION, JOINT, RIGHT HIP Interarticular under flouro    INTRAMEDULLARY RODDING OF FEMUR Left 5/21/2019    Procedure: INSERTION, INTRAMEDULLARY ARIEL, FEMUR;  Surgeon: López Duff MD;  Location: [facility] OR 2ND FLR;  Service: Orthopedics;  Laterality: Left;    REPAIR OF TRICEPS TENDON Left 10/17/2018    Procedure: REPAIR, TENDON, TRICEPS left elbow;  Surgeon: Staci Yarbrough MD;  Location: [facility] OR 1ST FLR;  Service: Orthopedics;  Laterality: Left;  Anesthesia: General and regional. PRONE, k-wire , hand pan 1 and pan 2, CALL ARTHREX/Tamera notified 10-12 LO    TONSILLECTOMY      TRANSFORAMINAL EPIDURAL INJECTION OF STEROID Right 8/31/2020    Procedure: INJECTION, STEROID, EPIDURAL, TRANSFORAMINAL,  APPROACH, L3-L4 and L4-L5 need consent;  Surgeon: Tj Mayfield MD;  Location: [facility] PAIN MGT;  Service: Pain Management;  Laterality: Right;    TUBAL LIGATION  2003    UPPER GASTROINTESTINAL ENDOSCOPY      10/11    uterine ablation  2003       Family History:  Family History    Problem Relation Age of Onset    Cancer Mother         Lung Cancer    Emphysema Mother     Heart attack Mother     Cancer Father         Lung Cancer    Osteoarthritis Father     Lung cancer Father     Skin cancer Father     Heart disease Brother     Heart attack Brother     Osteoarthritis Paternal Aunt     Retinal detachment Maternal Aunt     No Known Problems Sister     No Known Problems Maternal Uncle     No Known Problems Paternal Uncle     No Known Problems Maternal Grandmother     No Known Problems Maternal Grandfather     No Known Problems Paternal Grandmother     No Known Problems Paternal Grandfather     Colon cancer Neg Hx     Esophageal cancer Neg Hx     Stomach cancer Neg Hx     Celiac disease Neg Hx     Diabetes Neg Hx     Thyroid disease Neg Hx     Amblyopia Neg Hx     Blindness Neg Hx     Cataracts Neg Hx     Glaucoma Neg Hx     Hypertension Neg Hx     Macular degeneration Neg Hx     Strabismus Neg Hx     Stroke Neg Hx        Social History:  Social History     Socioeconomic History    Marital status:      Spouse name: Not on file    Number of children: Not on file    Years of education: Not on file    Highest education level: Not on file   Occupational History    Not on file   Social Needs    Financial resource strain: Not on file    Food insecurity     Worry: Not on file     Inability: Not on file    Transportation needs     Medical: Not on file     Non-medical: Not on file   Tobacco Use    Smoking status: Never Smoker    Smokeless tobacco: Never Used   Substance and Sexual Activity    Alcohol use: Yes     Comment: occasionally    Drug use: No    Sexual activity: Yes     Partners: Male     Birth control/protection: Surgical   Lifestyle    Physical activity     Days per week: Not on file     Minutes per session: Not on file    Stress: Not on file   Relationships    Social connections     Talks on phone: Not on file     Gets together: Not on file  "    Attends Voodoo service: Not on file     Active member of club or organization: Not on file     Attends meetings of clubs or organizations: Not on file     Relationship status: Not on file   Other Topics Concern    Are you pregnant or think you may be? Not Asked    Breast-feeding Not Asked   Social History Narrative    Not on file       OBJECTIVE:    BP (!) 146/89   Pulse (!) 116   Temp 99.4 °F (37.4 °C)   Ht 5' 1" (1.549 m)   Wt 71.4 kg (157 lb 6.5 oz)   LMP  (LMP Unknown)   BMI 29.74 kg/m²     PHYSICAL EXAMINATION:    General appearance: Well appearing, in no acute distress, alert and oriented x3.  Psych:  Mood and affect appropriate.  Back: TTP over facet joints.  Pain on lumbar extension.  Positive facet loading bilaterally.  Negative SLR bilaterally.  Right Hip: Decreased ROM of right hip with global pain.  Weston's causes significant hip and groin pain.   Gait: Antalgic- ambulates without assistance.    ASSESSMENT: 59 y.o. year old female with right sided back and hip pain, consistent with the following diagnoses:     1. Osteoarthritis of spine, unspecified spinal osteoarthritis complication status, unspecified spinal region     2. Spondylosis without myelopathy     3. Lumbosacral radiculopathy     4. Osteoarthritis of right hip, unspecified osteoarthritis type     5. Primary osteoarthritis of right hip           PLAN:     - Lumbar and hip MRI reviewed with patient.  She has a labral tear to right hip.    - She is s/p right L3/4 and L4/5 TF MADELINE with benefit of leg pain.  She continues with significant hip and groin pain.  I recommended that she return to orthopedics because I believe that what remains is originating from her hip.  She also plans to see Dr. Steele again for her left foot.    - Continue with PT.    - Can continue current medications.    - RTC as needed.    - Counseled patient regarding the importance of constant sleeping habits and physical therapy.    The above plan and " management options were discussed at length with patient. Patient is in agreement with the above and verbalized understanding.    Valarie Law  09/16/2020

## 2020-09-17 ENCOUNTER — TELEPHONE (OUTPATIENT)
Dept: PHARMACY | Facility: CLINIC | Age: 60
End: 2020-09-17

## 2020-09-17 DIAGNOSIS — R52 PAIN: Primary | ICD-10-CM

## 2020-09-17 NOTE — TELEPHONE ENCOUNTER
2nd attempt to reach patient regarding Kevzara follow up and Kevzara & Forteo refill - LVM for call back.

## 2020-09-18 ENCOUNTER — OFFICE VISIT (OUTPATIENT)
Dept: SPORTS MEDICINE | Facility: CLINIC | Age: 60
End: 2020-09-18
Payer: MEDICARE

## 2020-09-18 ENCOUNTER — HOSPITAL ENCOUNTER (OUTPATIENT)
Dept: RADIOLOGY | Facility: HOSPITAL | Age: 60
Discharge: HOME | End: 2020-09-18
Attending: ORTHOPAEDIC SURGERY
Payer: MEDICARE

## 2020-09-18 ENCOUNTER — OFFICE VISIT (OUTPATIENT)
Dept: ORTHOPEDICS | Facility: CLINIC | Age: 60
End: 2020-09-18
Payer: MEDICARE

## 2020-09-18 VITALS
HEART RATE: 90 BPM | DIASTOLIC BLOOD PRESSURE: 82 MMHG | HEIGHT: 61 IN | WEIGHT: 159.19 LBS | SYSTOLIC BLOOD PRESSURE: 127 MMHG | BODY MASS INDEX: 30.06 KG/M2

## 2020-09-18 DIAGNOSIS — R52 PAIN: ICD-10-CM

## 2020-09-18 DIAGNOSIS — M66.872 NONTRAUMATIC RUPTURE OF POSTERIOR TIBIAL TENDON, LEFT: ICD-10-CM

## 2020-09-18 DIAGNOSIS — M70.61 TROCHANTERIC BURSITIS OF RIGHT HIP: ICD-10-CM

## 2020-09-18 DIAGNOSIS — R52 PAIN: Primary | ICD-10-CM

## 2020-09-18 DIAGNOSIS — G89.29 CHRONIC RIGHT HIP PAIN: Primary | ICD-10-CM

## 2020-09-18 DIAGNOSIS — M21.42 PES PLANOVALGUS, ACQUIRED, LEFT: ICD-10-CM

## 2020-09-18 DIAGNOSIS — M25.551 CHRONIC RIGHT HIP PAIN: Primary | ICD-10-CM

## 2020-09-18 PROCEDURE — 99214 PR OFFICE/OUTPT VISIT, EST, LEVL IV, 30-39 MIN: ICD-10-PCS | Mod: 25,S$GLB,, | Performed by: PHYSICIAN ASSISTANT

## 2020-09-18 PROCEDURE — 99999 PR PBB SHADOW E&M-EST. PATIENT-LVL V: ICD-10-PCS | Mod: PBBFAC,,, | Performed by: PHYSICIAN ASSISTANT

## 2020-09-18 PROCEDURE — 99999 PR PBB SHADOW E&M-EST. PATIENT-LVL II: CPT | Mod: PBBFAC,,, | Performed by: ORTHOPAEDIC SURGERY

## 2020-09-18 PROCEDURE — 99213 OFFICE O/P EST LOW 20 MIN: CPT | Mod: S$GLB,,, | Performed by: ORTHOPAEDIC SURGERY

## 2020-09-18 PROCEDURE — 99999 PR PBB SHADOW E&M-EST. PATIENT-LVL V: CPT | Mod: PBBFAC,,, | Performed by: PHYSICIAN ASSISTANT

## 2020-09-18 PROCEDURE — 73630 XR FOOT COMPLETE 3 VIEW LEFT: ICD-10-PCS | Mod: 26,LT,, | Performed by: RADIOLOGY

## 2020-09-18 PROCEDURE — 99214 OFFICE O/P EST MOD 30 MIN: CPT | Mod: 25,S$GLB,, | Performed by: PHYSICIAN ASSISTANT

## 2020-09-18 PROCEDURE — 3008F BODY MASS INDEX DOCD: CPT | Mod: CPTII,S$GLB,, | Performed by: PHYSICIAN ASSISTANT

## 2020-09-18 PROCEDURE — 20610 DRAIN/INJ JOINT/BURSA W/O US: CPT | Mod: RT,S$GLB,, | Performed by: PHYSICIAN ASSISTANT

## 2020-09-18 PROCEDURE — 73630 X-RAY EXAM OF FOOT: CPT | Mod: 26,LT,, | Performed by: RADIOLOGY

## 2020-09-18 PROCEDURE — 3008F PR BODY MASS INDEX (BMI) DOCUMENTED: ICD-10-PCS | Mod: CPTII,S$GLB,, | Performed by: PHYSICIAN ASSISTANT

## 2020-09-18 PROCEDURE — 20610 PR DRAIN/INJECT LARGE JOINT/BURSA: ICD-10-PCS | Mod: RT,S$GLB,, | Performed by: PHYSICIAN ASSISTANT

## 2020-09-18 PROCEDURE — 99213 PR OFFICE/OUTPT VISIT, EST, LEVL III, 20-29 MIN: ICD-10-PCS | Mod: S$GLB,,, | Performed by: ORTHOPAEDIC SURGERY

## 2020-09-18 PROCEDURE — 99999 PR PBB SHADOW E&M-EST. PATIENT-LVL II: ICD-10-PCS | Mod: PBBFAC,,, | Performed by: ORTHOPAEDIC SURGERY

## 2020-09-18 PROCEDURE — 73630 X-RAY EXAM OF FOOT: CPT | Mod: TC,LT

## 2020-09-18 RX ADMIN — TRIAMCINOLONE ACETONIDE 40 MG: 40 INJECTION, SUSPENSION INTRA-ARTICULAR; INTRAMUSCULAR at 09:09

## 2020-09-20 ENCOUNTER — PATIENT MESSAGE (OUTPATIENT)
Dept: ORTHOPEDICS | Facility: CLINIC | Age: 60
End: 2020-09-20

## 2020-09-21 ENCOUNTER — TELEPHONE (OUTPATIENT)
Dept: ORTHOPEDICS | Facility: CLINIC | Age: 60
End: 2020-09-21

## 2020-09-21 ENCOUNTER — PATIENT MESSAGE (OUTPATIENT)
Dept: ORTHOPEDICS | Facility: CLINIC | Age: 60
End: 2020-09-21

## 2020-09-21 DIAGNOSIS — M84.375A STRESS FRACTURE OF LEFT FOOT, INITIAL ENCOUNTER: Primary | ICD-10-CM

## 2020-09-21 RX ORDER — DIAZEPAM 5 MG/1
10 TABLET ORAL
Status: CANCELLED | OUTPATIENT
Start: 2020-09-21 | End: 2020-09-21

## 2020-09-21 RX ORDER — TRIAMCINOLONE ACETONIDE 40 MG/ML
40 INJECTION, SUSPENSION INTRA-ARTICULAR; INTRAMUSCULAR
Status: COMPLETED | OUTPATIENT
Start: 2020-09-18 | End: 2020-09-18

## 2020-09-21 NOTE — PROGRESS NOTES
I am going to order an MRI to confirm whether not the lucency seen on the left calcaneus represents a new stress fracture or not.

## 2020-09-21 NOTE — PROGRESS NOTES
Joyce Peterson  Returns today for follow-up.  This is a 59-year-old female with severe rheumatoid arthritis and osteoporosis who has had history of previous stress fractures in her feet.  She also has posterior tibial tendon rupture of her left foot with acquired pes planovalgus which she has been managing with orthotics.  After I saw her in June she went to have some modifications done to her orthotics which she states really have not helped.  She returns today with continued lateral left hindfoot pain; she points to the lateral aspect of her heel and in the sub fibular region.    Examination:  On standing inspection she has  severe pes planovalgus alignment of her left foot.  On sitting exam she has tenderness laterally about her heel as well as along the course of the peroneal tendons.  She has no significant medial-sided tenderness but is swollen along the course of the posterior tibial tendon.  She has functional motion of her ankle without pain.  She has some decreased motion of her subtalar complex with mild discomfort.  She is neurovascularly intact    Imaging:  I ordered and reviewed standing x-ray of her left foot today.  There is significant collapse of the longitudinal arch on the lateral view with some uncovering of the talar head on the AP view.  There are diffuse degenerative changes.  There is a subtle radiolucency on the anterior aspect of the calcaneus that was pointed out by the radiologist that could represent a stress fracture as it is within the region of her pain.    Impression:  1.  Left posterior tibial tendon dysfunction acquired pes planovalgus deformity with lateral impingement   2.  Possible stress fracture left calcaneus    Recommendation:  Her orthotics are not able to control her hindfoot valgus and I think she would benefit from a UCBL or AFO to help contain her hindfoot.  She is going to go into a fracture boot and I will have her follow up in 4 weeks with a repeat x-ray of her  left calcaneus.

## 2020-09-22 ENCOUNTER — TELEPHONE (OUTPATIENT)
Dept: UROLOGY | Facility: CLINIC | Age: 60
End: 2020-09-22

## 2020-09-22 DIAGNOSIS — M84.375A STRESS FRACTURE OF LEFT FOOT, INITIAL ENCOUNTER: Primary | ICD-10-CM

## 2020-09-22 RX ORDER — DIAZEPAM 10 MG/1
10 TABLET ORAL ONCE
Qty: 1 TABLET | Refills: 0 | Status: SHIPPED | OUTPATIENT
Start: 2020-09-22 | End: 2020-10-28

## 2020-09-22 NOTE — PROGRESS NOTES
CC: right hip pain    HPI:   Joyce Peterson is a pleasant 59 y.o. patient who reports to clinic with right hip pain. No trauma, no mech sxs/instabilty.    PMH of Rheumatoid Arthritis, Immunosuppressed status, CAD, and steroid-induced osteoporosis.      Presents to clinic with 2 months of anterior lateral right hip pain that is gradually worsening despite conservative management. We have ruled out stress reaction or stress fracture of the right hip previously. She now reports lateral right hip pain. Standing and laying on the hip makes the pain worse. She describes the pain as a constant ache. Her pain is worse in the evening. She has tried tylenol, rest, and HEP with no pain improvement.     She rates her pain at a 9/10 today.     She had a previous right trochanteric bursa steroid injection by Dr. Moran on 7/8/20 with 50% same day pain relief but this wore off a few days later.     She occasionally has some tingling from her back to her buttocks and posterior thigh to knee.     Pain is affecting ADLs.      Previous left hip surgery for chronic stress fracture on 5/21/2019. doing great  OPERATIVE NOTE     DOS:               05/21/2019     Preop Dx:        Left intertrochanteric femur fracture     Postop Dx:      Left intertrochanteric femur fracture     Procedure:      Cephalomedullary nail fixation of left intertrochanteric femur fracture - 78113       She has a PMH of Rheumatoid Arthritis see by Dr. Moran.  She is on chronic prednisone treatment currently on 5 mg daily   PMH of 9 stress fractures in the past to various areas.     Medical history notable for osteopenia (on Prolia (denosumab) and citracal+D, last dexa 2/28/19 T-score -2.1 lumbar vertebra, -1.1 left femoral neck) and RA (on Kevzara and prednisone 5 mg)    Review of Systems   Constitution: Negative. Negative for chills, fever and night sweats.   HENT: Negative for congestion and headaches.    Eyes: Negative for blurred vision, left vision loss  and right vision loss.   Cardiovascular: Negative for chest pain and syncope.   Respiratory: Negative for cough and shortness of breath.    Endocrine: Negative for polydipsia, polyphagia and polyuria.   Hematologic/Lymphatic: Negative for bleeding problem. Does not bruise/bleed easily.   Skin: Negative for dry skin, itching and rash.   Musculoskeletal: Negative for falls and muscle weakness.   Gastrointestinal: Negative for abdominal pain and bowel incontinence.   Genitourinary: Negative for bladder incontinence and nocturia.   Neurological: Negative for disturbances in coordination, loss of balance and seizures.   Psychiatric/Behavioral: Negative for depression. The patient does not have insomnia.    Allergic/Immunologic: Negative for hives and persistent infections.   All other systems negative.    PAST MEDICAL HISTORY:   Past Medical History:   Diagnosis Date    Acid reflux     Allergy     Anemia     Asthma     Coronary artery disease     Degenerative disc disease     Dry eyes     Dry mouth     Gastroparesis     Hyperlipidemia     Lateral meniscus derangement 4/6/2016    Lobular carcinoma in situ     Osteoarthritis     Osteoporosis     Rheumatoid arthritis(714.0)     Umbilical hernia 8/13/2015     PAST SURGICAL HISTORY:   Past Surgical History:   Procedure Laterality Date    BREAST BIOPSY Left 01/29/2002    core bx    CARPAL TUNNEL RELEASE Right 05/2017    CHOLECYSTECTOMY  2004    COLONOSCOPY      10/11    COLONOSCOPY N/A 6/29/2017    Procedure: COLONOSCOPY;  Surgeon: López Moore MD;  Location: [facility] ENDO [address]);  Service: Endoscopy;  Laterality: N/A;    INJECTION OF FACET JOINT Bilateral 3/12/2020    Procedure: FACET JOINT INJECTION (LUMBAR BLOCK) BILATERAL L4-5 AND L5-S1 DIRECT REFERRAL;  Surgeon: Tj Mayfield MD;  Location: [facility];  Service: Pain Management;  Laterality: Bilateral;  NEEDS CONSENT    INJECTION OF JOINT Right 7/30/2020    Procedure: INJECTION, JOINT, RIGHT HIP  Interarticular under flouro;  Surgeon: Tj Mayfield MD;  Location: [facility] PAIN MGT;  Service: Pain Management;  Laterality: Right;  INJECTION, JOINT, RIGHT HIP Interarticular under flouro    INTRAMEDULLARY RODDING OF FEMUR Left 5/21/2019    Procedure: INSERTION, INTRAMEDULLARY ARIEL, FEMUR;  Surgeon: López Duff MD;  Location: [facility] OR 2ND FLR;  Service: Orthopedics;  Laterality: Left;    REPAIR OF TRICEPS TENDON Left 10/17/2018    Procedure: REPAIR, TENDON, TRICEPS left elbow;  Surgeon: Staci Yarbrough MD;  Location: [facility] OR 1ST FLR;  Service: Orthopedics;  Laterality: Left;  Anesthesia: General and regional. PRONE, k-wire , hand pan 1 and pan 2, CALL ARTHREX/Tamera notified 10-12 LO    TONSILLECTOMY      TRANSFORAMINAL EPIDURAL INJECTION OF STEROID Right 8/31/2020    Procedure: INJECTION, STEROID, EPIDURAL, TRANSFORAMINAL,  APPROACH, L3-L4 and L4-L5 need consent;  Surgeon: jT Mayfield MD;  Location: [facility] PAIN MGT;  Service: Pain Management;  Laterality: Right;    TUBAL LIGATION  2003    UPPER GASTROINTESTINAL ENDOSCOPY      10/11    uterine ablation  2003     FAMILY HISTORY:   Family History   Problem Relation Age of Onset    Cancer Mother         Lung Cancer    Emphysema Mother     Heart attack Mother     Cancer Father         Lung Cancer    Osteoarthritis Father     Lung cancer Father     Skin cancer Father     Heart disease Brother     Heart attack Brother     Osteoarthritis Paternal Aunt     Retinal detachment Maternal Aunt     No Known Problems Sister     No Known Problems Maternal Uncle     No Known Problems Paternal Uncle     No Known Problems Maternal Grandmother     No Known Problems Maternal Grandfather     No Known Problems Paternal Grandmother     No Known Problems Paternal Grandfather     Colon cancer Neg Hx     Esophageal cancer Neg Hx     Stomach cancer Neg Hx     Celiac disease Neg Hx     Diabetes Neg Hx     Thyroid disease Neg Hx     Amblyopia Neg Hx      Blindness Neg Hx     Cataracts Neg Hx     Glaucoma Neg Hx     Hypertension Neg Hx     Macular degeneration Neg Hx     Strabismus Neg Hx     Stroke Neg Hx      SOCIAL HISTORY:   Social History     Socioeconomic History    Marital status:      Spouse name: Not on file    Number of children: Not on file    Years of education: Not on file    Highest education level: Not on file   Occupational History    Not on file   Social Needs    Financial resource strain: Not on file    Food insecurity     Worry: Not on file     Inability: Not on file    Transportation needs     Medical: Not on file     Non-medical: Not on file   Tobacco Use    Smoking status: Never Smoker    Smokeless tobacco: Never Used   Substance and Sexual Activity    Alcohol use: Yes     Comment: occasionally    Drug use: No    Sexual activity: Yes     Partners: Male     Birth control/protection: Surgical   Lifestyle    Physical activity     Days per week: Not on file     Minutes per session: Not on file    Stress: Not on file   Relationships    Social connections     Talks on phone: Not on file     Gets together: Not on file     Attends Confucianism service: Not on file     Active member of club or organization: Not on file     Attends meetings of clubs or organizations: Not on file     Relationship status: Not on file   Other Topics Concern    Are you pregnant or think you may be? Not Asked    Breast-feeding Not Asked   Social History Narrative    Not on file       MEDICATIONS:   Current Outpatient Medications:     albuterol (ACCUNEB) 1.25 mg/3 mL Nebu, Take 3 mLs (1.25 mg total) by nebulization every 6 (six) hours as needed. Rescue, Disp: 1 Box, Rfl: 11    albuterol (PROVENTIL HFA) 90 mcg/actuation inhaler, Inhale 2 puffs into the lungs every 6 (six) hours as needed. Rescue, Disp: 20.1 g, Rfl: 11    aspirin 81 mg Tab, Take 81 mg by mouth every morning. , Disp: , Rfl:     azelastine (ASTELIN) 137 mcg (0.1 %) nasal spray,  1 spray (137 mcg total) by Nasal route 2 (two) times daily., Disp: 30 mL, Rfl: 0    benzonatate (TESSALON) 100 MG capsule, Take 1 capsule by mouth three times a day, Disp: 9 capsule, Rfl: 0    budesonide-formoterol 160-4.5 mcg (SYMBICORT) 160-4.5 mcg/actuation HFAA, Inhale 2 puffs into the lungs every 12 (twelve) hours., Disp: 3 Inhaler, Rfl: 3    calcium citrate-vitamin D (CITRACAL + D) 315-200 mg-unit per tablet, Take 1 tablet by mouth 2 (two) times daily. , Disp: , Rfl:     ciclopirox (PENLAC) 8 % Soln, Apply daily to affected nail. Must remove and restart weekly, Disp: 1 Bottle, Rfl: 5    diclofenac sodium (VOLTAREN) 1 % Gel, APPLY 2 GRAMS TOPICALLY 4 (FOUR) TIMES DAILY AS NEEDED., Disp: , Rfl:     erythromycin (ROMYCIN) ophthalmic ointment, Apply to affected external eyelid tissue two times per day as directed for fourteen days, Disp: 3.5 g, Rfl: 2    fluconazole (DIFLUCAN) 100 MG tablet, , Disp: , Rfl:     fluticasone propionate (FLONASE) 50 mcg/actuation nasal spray, 2 sprays (100 mcg total) by Each Nostril route once daily., Disp: 9.9 mL, Rfl: 11    GUAIFENESIN (MUCINEX ORAL), Take 400 mg by mouth every 4 (four) hours as needed. , Disp: , Rfl:     INV PROPULSID 10 MG, Take 1 tablets (20 mg) by mouth 4 (four) times daily. FOR INVESTIGATIONAL USE ONLY. Protocol CIS-USA-154, Disp: 1440 each, Rfl: 0    leflunomide (ARAVA) 20 MG Tab, Take 1 tablet (20 mg total) by mouth once daily., Disp: 90 tablet, Rfl: 0    lifitegrast (XIIDRA) 5 % Dpet, Place 1 drop into both eyes 2 (two) times daily., Disp: 60 each, Rfl: 12    montelukast (SINGULAIR) 10 mg tablet, Take 1 tablet (10 mg total) by mouth nightly., Disp: 90 tablet, Rfl: 3    naproxen (NAPROSYN) 500 MG tablet, Take 1 tablet (500 mg total) by mouth 2 (two) times daily as needed., Disp: 180 tablet, Rfl: 0    omeprazole (PRILOSEC) 40 MG capsule, Take 1 capsule (40 mg total) by mouth every morning., Disp: 30 capsule, Rfl: 6    omeprazole-sodium  "bicarbonate (ZEGERID) 40-1.1 mg-gram per capsule, Take 1 capsule by mouth every morning., Disp: 90 capsule, Rfl: 3    POTASSIUM ORAL, Take by mouth once daily., Disp: , Rfl:     predniSONE (DELTASONE) 1 MG tablet, TAKE 1 TABLET BY MOUTH EVERY DAY, Disp: 90 tablet, Rfl: 1    predniSONE (DELTASONE) 5 MG tablet, Take 1 tablet (5 mg total) by mouth once daily., Disp: 90 tablet, Rfl: 1    PREMARIN 0.625 mg tablet, Take 0.625 mg by mouth once daily., Disp: , Rfl: 4    PREMARIN vaginal cream, PLACE 0.5 GRAM VAGINALLY 3 (THREE) TIMES A WEEK., Disp: 30 g, Rfl: 1    progesterone (PROMETRIUM) 100 MG capsule, Take 100 mg by mouth every morning. 1 Capsule Oral Every day, morning, Disp: , Rfl:     RESTASIS 0.05 % ophthalmic emulsion, PLACE 0.4 MLS (1 DROP TOTAL) INTO BOTH EYES 2 (TWO) TIMES DAILY., Disp: 60 vial, Rfl: 5    rizatriptan (MAXALT) 10 MG tablet, Take 10 mg by mouth as needed for Migraine. , Disp: , Rfl:     rosuvastatin (CRESTOR) 20 MG tablet, Take 1 tablet (20 mg total) by mouth every evening., Disp: 90 tablet, Rfl: 3    sarilumab (KEVZARA) 200 mg/1.14 mL Syrg, Inject 1.14 mLs (200 mg total) into the skin every 14 (fourteen) days., Disp: 2.28 mL, Rfl: 2    sucralfate (CARAFATE) 1 gram tablet, Take 1 tablet (1 g total) by mouth 4 (four) times daily., Disp: 360 tablet, Rfl: 3    syringe with needle (TUBERCULIN SYRINGE) 1 mL 27 x 1/2" Syrg, To administer methotrexate 7.5mg sc once a week, Disp: 12 Syringe, Rfl: 3    teriparatide (FORTEO) 20 mcg/dose - 600 mcg/2.4 mL PnIj, Inject 0.08 mLs (20 mcg total) into the skin once daily., Disp: 2.4 mL, Rfl: 11    traMADol (ULTRAM) 50 mg tablet, Take 1 tablet (50 mg total) by mouth every 24 hours as needed for Pain., Disp: 7 tablet, Rfl: 0    triamcinolone acetonide 0.1% (KENALOG) 0.1 % ointment, aaa qhs , then vaseline and warm towel for flares. Not more than 2 weeks straight in same location. Avoid face and groin, Disp: 45 g, Rfl: 1    trospium (SANCTURA) 20 mg " "Tab tablet, TAKE 1 TABLET BY MOUTH TWICE A DAY. DUE FOR FOLLOW UP IN JULY, Disp: 180 tablet, Rfl: 0    Current Facility-Administered Medications:     betamethasone acetate-betamethasone sodium phosphate injection 6 mg, 6 mg, Intra-articular, Once, Tj Mayfield MD    Facility-Administered Medications Ordered in Other Visits:     0.9%  NaCl infusion, , Intravenous, Continuous, Callum Singer MD, Stopped at 05/21/19 1100  ALLERGIES:   Review of patient's allergies indicates:   Allergen Reactions    Actemra [tocilizumab] Other (See Comments)     Severe dizziness    Codeine Nausea And Vomiting    Gold au 198 Hives and Rash    Hydroxychloroquine Other (See Comments)     Can't remember the reaction      Iodinated contrast media Other (See Comments)     Other reaction(s): BURNING ALL OVER    Iodine Other (See Comments)     Other reaction(s): BURNING ALL OVER - Iodine dye - Not topical    Sulfa (sulfonamide antibiotics) Other (See Comments)     Can't remember the reaction    Zofran [ondansetron hcl (pf)] Nausea And Vomiting     Pt reports that last time she received zofran she started vomiting again    Methotrexate analogues Nausea Only    Pneumovax 23 [pneumococcal 23-argenis ps vaccine] Other (See Comments)     "sick"       VITAL SIGNS: /82   Pulse 90   Ht 5' 1" (1.549 m)   Wt 72.2 kg (159 lb 3.2 oz)   LMP  (LMP Unknown)   BMI 30.08 kg/m²        PHYSICAL EXAM /  HIP  PHYSICAL EXAMINATION  General:  The patient is alert and oriented x 3.  Mood is pleasant.  Observation of ears, eyes and nose reveal no gross abnormalities.  HEENT: NCAT, sclera nonicteric  Lungs: Respirations are equal and unlabored..    Right HIP EXAMINATION     OBSERVATION / INSPECTION  Gait:   Nonantalgic   Alignment:  Neutral   Scars:   None   Muscle atrophy: None   Effusion:  None   Warmth:  None   Discoloration:   None   Leg lengths:   Equal   Pelvis:   Level     TENDERNESS / CREPITUS (T/C):      T / C  Trochanteric bursa    + / " -  Piriformis    - / -  SI joint    - / -  Psoas tendon   - / -  Rectus insertion  - / -  Adductor origin  -/ -  Pubic symphysis  - / -  IT band                                   + / -  Gluteus tendons                     - / -    ROM: (* = pain)    Flexion:    120 degrees*  External rotation: 35 degrees  Internal rotation with axial load: 20 degrees*  Internal rotation without axial load: 30 degrees  Abduction:  35 degrees*  Adduction:   20 degrees    SPECIAL TESTS:  Pain w/ forced internal rotation (FADIR): -   Pain w/ forced external rotation (SANDRA): -   Circumduction test:    -  Stinchfield test:    -  Log roll:      Negative   Snapping hip (internal):   Negative   Step-down test:    +  Trendelenburg test:    Negative  Bridge test     +     EXTREMITY NEURO-VASCULAR EXAMINATION:   Sensation:  Grossly intact to light touch all dermatomal regions.   Motor Function:  Fully intact motor function at hip, knee, foot and ankle    DTRs;  quadriceps and  achilles 2+.  No clonus and downgoing Babinski.    Vascular status:  DP and PT pulses 2+, brisk capillary refill, symmetric.    Skin:  intact, compartments soft.    OTHER FINDINGS:    XRAYS:  2 hip/pelvis views were independently reviewed and interpreted by myself.  No fracture, subluxation. Mild right hip DJD noted.    ASSESSMENT:    1. Right hip pain, chronic  2.  Right hip trochanteric bursitis    PLAN:  1.  PROCEDURE NOTE: right TROCHANTERIC BURSA INJECTION   After time out was performed, including verification of patient ID, procedure, site and side, availability of information and equipment, review of safety issues, and agreement with consent, the right procedure site was marked and the patient was prepped aseptically. A diagnostic and therapeutic injection given, the right hip bursa was prepped with Betadine and alcohol and was injected with 1cc 40 mg of Kenalog, 4cc 1% lidocaine and 4cc 0.25% bupivacaine from a lateral approach. The patient tolerated the  procedure well. Significant relief.   The patient had no adverse reactions to the medication. Pain decreased. The patient was instructed to apply ice to the joint for 20 minutes and avoid strenuous activities for 24-36 hours following the injection. Patient was warned of possible blood sugar and/or blood pressure changes during that time. Following that time, patient can resume regular activities.    2.  She is currently on Prolia for her osteopenia  3.  Continue current pain management  Keep pain log following injection.   RTC prn hip pain.     All questions were answered, pt will contact us for questions or concerns in the interim.

## 2020-09-22 NOTE — TELEPHONE ENCOUNTER
----- Message from Dayton Kelley sent at 9/22/2020  9:49 AM CDT -----  Patient called in requesting to speak with you. Patient prefers to speak with a nurse. Patient wants to establish with your office as her  is a patient and she is looking for a new Urologist. Please advise. [phone]

## 2020-09-22 NOTE — TELEPHONE ENCOUNTER
Called and spoke with pt in regards of her message. Pt states that she is looking for another Urologist , and is in need of an appointment to [facility] care with Dr. Chiang . Pt made an appointment for 10/23/2020 for 9:00am.

## 2020-09-23 NOTE — PROGRESS NOTES
Subjective:       Patient ID: Joyce Peterson is a 59 y.o. female.    Chief Complaint: No chief complaint on file.    Patient is a 59 y.o.female who presents today for cough  The patient location is: [place]  The chief complaint leading to consultation is: cough    Visit type: audiovisual    Face to Face time with patient: 10 min   minutes of total time spent on the encounter, which includes face to face time and non-face to face time preparing to see the patient (eg, review of tests), Obtaining and/or reviewing separately obtained history, Documenting clinical information in the electronic or other health record, Independently interpreting results (not separately reported) and communicating results to the patient/family/caregiver, or Care coordination (not separately reported).         Each patient to whom he or she provides medical services by telemedicine is:  (1) informed of the relationship between the physician and patient and the respective role of any other health care provider with respect to management of the patient; and (2) notified that he or she may decline to receive medical services by telemedicine and may withdraw from such care at any time.    Notes: pt complains of asthma exacerbation. No fever. Some shortness of breath from her asthma. Mild wheezing. Some sinus pressure with green nasal discharge. No sore throat. She would like to try some medication. No exposure to covid at all. Stays home due to her RA.   Review of Systems   Constitutional: Negative for appetite change, chills, diaphoresis and fever.   HENT: Negative for congestion, ear discharge, ear pain, postnasal drip, tinnitus, trouble swallowing and voice change.    Eyes: Negative for discharge, redness and itching.   Respiratory: Positive for cough, shortness of breath and wheezing. Negative for chest tightness.    Cardiovascular: Negative for chest pain, palpitations and leg swelling.   Gastrointestinal: Negative for abdominal pain,  constipation, diarrhea, nausea and vomiting.   Endocrine: Negative for cold intolerance and heat intolerance.   Genitourinary: Negative for difficulty urinating, flank pain, hematuria and urgency.   Musculoskeletal: Negative for arthralgias, gait problem, myalgias and neck stiffness.   Skin: Negative for color change and rash.   Neurological: Negative for dizziness, seizures, syncope and headaches.   Hematological: Negative for adenopathy.   Psychiatric/Behavioral: Negative for agitation, behavioral problems, confusion and sleep disturbance.       Objective:      Physical Exam  Constitutional:       General: She is not in acute distress.     Appearance: She is well-developed. She is not diaphoretic.   HENT:      Head: Normocephalic and atraumatic.      Right Ear: External ear normal.      Left Ear: External ear normal.   Eyes:      General: No scleral icterus.        Right eye: No discharge.         Left eye: No discharge.      Conjunctiva/sclera: Conjunctivae normal.      Pupils: Pupils are equal, round, and reactive to light.   Neck:      Musculoskeletal: Normal range of motion and neck supple.   Pulmonary:      Effort: Pulmonary effort is normal.      Breath sounds: No stridor.   Neurological:      Mental Status: She is alert and oriented to person, place, and time.   Psychiatric:         Behavior: Behavior normal.         Thought Content: Thought content normal.         Judgment: Judgment normal.         Assessment and Plan:       1. Cough      2. Acute bronchitis, unspecified organism      Notify clinic if symptoms change, worsen or do not improve    Start zpak, cheratussin ac at night prn cough ( may cause drowsiness). Use neb once per day and continue all inhalers. If symptoms worsen, let us know asap to test for covid. Pt agreeable.          No follow-ups on file.

## 2020-09-24 ENCOUNTER — TELEPHONE (OUTPATIENT)
Dept: ORTHOPEDICS | Facility: CLINIC | Age: 60
End: 2020-09-24

## 2020-09-24 ENCOUNTER — PATIENT MESSAGE (OUTPATIENT)
Dept: ORTHOPEDICS | Facility: CLINIC | Age: 60
End: 2020-09-24

## 2020-09-24 ENCOUNTER — OFFICE VISIT (OUTPATIENT)
Dept: INTERNAL MEDICINE | Facility: CLINIC | Age: 60
End: 2020-09-24
Payer: MEDICARE

## 2020-09-24 ENCOUNTER — HOSPITAL ENCOUNTER (OUTPATIENT)
Dept: RADIOLOGY | Facility: HOSPITAL | Age: 60
Discharge: HOME | End: 2020-09-24
Attending: ORTHOPAEDIC SURGERY
Payer: MEDICARE

## 2020-09-24 DIAGNOSIS — M84.375A STRESS FRACTURE OF LEFT FOOT, INITIAL ENCOUNTER: ICD-10-CM

## 2020-09-24 DIAGNOSIS — J20.9 ACUTE BRONCHITIS, UNSPECIFIED ORGANISM: ICD-10-CM

## 2020-09-24 DIAGNOSIS — R05.9 COUGH: Primary | ICD-10-CM

## 2020-09-24 PROCEDURE — 99213 PR OFFICE/OUTPT VISIT, EST, LEVL III, 20-29 MIN: ICD-10-PCS | Mod: 95,,, | Performed by: INTERNAL MEDICINE

## 2020-09-24 PROCEDURE — 73718 MRI LOWER EXTREMITY W/O DYE: CPT | Mod: TC,LT

## 2020-09-24 PROCEDURE — 73718 MRI FOOT (HINDFOOT) LEFT WITHOUT CONTRAST: ICD-10-PCS | Mod: 26,LT,, | Performed by: RADIOLOGY

## 2020-09-24 PROCEDURE — 73718 MRI LOWER EXTREMITY W/O DYE: CPT | Mod: 26,LT,, | Performed by: RADIOLOGY

## 2020-09-24 PROCEDURE — 99213 OFFICE O/P EST LOW 20 MIN: CPT | Mod: 95,,, | Performed by: INTERNAL MEDICINE

## 2020-09-24 RX ORDER — AZITHROMYCIN 250 MG/1
TABLET, FILM COATED ORAL
Qty: 6 TABLET | Refills: 0 | Status: SHIPPED | OUTPATIENT
Start: 2020-09-24 | End: 2020-09-29

## 2020-09-24 RX ORDER — IPRATROPIUM BROMIDE AND ALBUTEROL SULFATE 2.5; .5 MG/3ML; MG/3ML
3 SOLUTION RESPIRATORY (INHALATION) EVERY 6 HOURS PRN
Qty: 1 BOX | Refills: 0 | Status: SHIPPED | OUTPATIENT
Start: 2020-09-24 | End: 2022-03-29 | Stop reason: SDUPTHER

## 2020-09-24 RX ORDER — CODEINE PHOSPHATE AND GUAIFENESIN 10; 100 MG/5ML; MG/5ML
5 SOLUTION ORAL NIGHTLY PRN
Qty: 50 ML | Refills: 0 | Status: SHIPPED | OUTPATIENT
Start: 2020-09-24 | End: 2020-10-04

## 2020-09-24 RX ORDER — BENZONATATE 100 MG/1
100 CAPSULE ORAL 3 TIMES DAILY PRN
Qty: 30 CAPSULE | Refills: 0 | Status: SHIPPED | OUTPATIENT
Start: 2020-09-24 | End: 2020-10-04

## 2020-10-19 ENCOUNTER — CLINICAL SUPPORT (OUTPATIENT)
Dept: REHABILITATION | Facility: HOSPITAL | Age: 60
End: 2020-10-19
Payer: MEDICARE

## 2020-10-19 DIAGNOSIS — Z74.09 IMPAIRED FUNCTIONAL MOBILITY, BALANCE, GAIT, AND ENDURANCE: ICD-10-CM

## 2020-10-19 DIAGNOSIS — M06.9 RHEUMATOID ARTHRITIS, INVOLVING UNSPECIFIED SITE, UNSPECIFIED WHETHER RHEUMATOID FACTOR PRESENT: Primary | ICD-10-CM

## 2020-10-19 PROCEDURE — 97162 PT EVAL MOD COMPLEX 30 MIN: CPT

## 2020-10-19 PROCEDURE — 97110 THERAPEUTIC EXERCISES: CPT

## 2020-10-19 NOTE — PLAN OF CARE
[facility]  Physical Therapy Re-Assessment    Name: Joyce Peterson  Clinic Number: [id number]  YOB: 1960    Therapy Diagnosis:   Encounter Diagnosis   Name Primary?    Impaired functional mobility, balance, gait, and endurance      Physician: Jett Julien III, *    Physician Orders: PT Eval and TreatInstructed of HEP, formal PT session if wanted by patient and definitely dry needling of the right hip flexor, bursa, and glutes for likely external hip pain.    Session is primarly for dry needling.   Medical Diagnosis from Referral: pain in right shoulder  Evaluation Date: 10/19/2020  Authorization Period Expiration:7/21/20   Plan of Care Expiration: 9/31/20  Visit # / Visits authorized: 9/ 20    Time In: 2:06 pm  Time Out: 2:45 pm  Total Billable Time: 39    Precautions: asthma, osteoporosis with pathological fracture left femur, RA, left ORIF and pathological fractrures of the right foot    Subjective   Date of onset: 4-6 months ago worsening of the right hip pain and she had to use a cane to walk; started in both PSIS with tingling down thighs and to the knees;   facet joint injections in L4/L5 and L5/S1 3/12/20; had injection to the right hip joint    History of current condition: Joyce Peterson presents with arthritis for 35 years. She has been in and out of therapy forever. She broke 5 bones last year, with rods in her L femur, which threw off her hips. The hip, lower back, and left foot bother her. It is an effort to walk. She has had injections in the back and hip. The foot pain wanders. Sometimes it is on the lateral side, sometimes the bottom, and yesterday the inside. She has custom inserts which she took off and felt it has relaxed a little. She is frustrated that walking is difficult, and that nobody seems to know what is going on. With walking, the foot hurts first, then the hip, then the back. Her bone density was very bad due to chronic  prednisone use, with very little trauma needed for the fractures. She now takes injections nightly and additional meds every 6 months for her bone density.     Past medical history:   Past Medical History:   Diagnosis Date    Acid reflux     Allergy     Anemia     Asthma     Coronary artery disease     Degenerative disc disease     Dry eyes     Dry mouth     Gastroparesis     Hyperlipidemia     Lateral meniscus derangement 4/6/2016    Lobular carcinoma in situ     Osteoarthritis     Osteoporosis     Rheumatoid arthritis(714.0)     Umbilical hernia 8/13/2015       Past Surgical history:  has a past surgical history that includes Cholecystectomy (2004); Tonsillectomy; Tubal ligation (2003); uterine ablation (2003); Upper gastrointestinal endoscopy; Colonoscopy; Carpal tunnel release (Right, 05/2017); Colonoscopy (N/A, 6/29/2017); Repair of triceps tendon (Left, 10/17/2018); Intramedullary rodding of femur (Left, 5/21/2019); Breast biopsy (Left, 01/29/2002); Injection of facet joint (Bilateral, 3/12/2020); Injection of joint (Right, 7/30/2020); and Transforaminal epidural injection of steroid (Right, 8/31/2020).    Medications: Joyce has a current medication list which includes the following prescription(s): albuterol, albuterol, albuterol-ipratropium, aspirin, azelastine, benzonatate, budesonide-formoterol 160-4.5 mcg, calcium citrate-vitamin d3 315-200 mg, ciclopirox, diazepam, diclofenac sodium, erythromycin, fluconazole, fluticasone propionate, guaifenesin, INV PROPULSID 10 MG, leflunomide, lifitegrast, montelukast, naproxen, omeprazole, omeprazole-sodium bicarbonate, potassium, prednisone, prednisone, premarin, premarin, progesterone, restasis, rizatriptan, rosuvastatin, sarilumab, sucralfate, syringe with needle, teriparatide, tramadol, triamcinolone acetonide 0.1%, and trospium, and the following Facility-Administered Medications: sodium chloride 0.9% and betamethasone acetate-betamethasone  "sodium phosphate.    Allergies:   Review of patient's allergies indicates:   Allergen Reactions    Actemra [tocilizumab] Other (See Comments)     Severe dizziness    Codeine Nausea And Vomiting    Gold au 198 Hives and Rash    Hydroxychloroquine Other (See Comments)     Can't remember the reaction      Iodinated contrast media Other (See Comments)     Other reaction(s): BURNING ALL OVER    Iodine Other (See Comments)     Other reaction(s): BURNING ALL OVER - Iodine dye - Not topical    Sulfa (sulfonamide antibiotics) Other (See Comments)     Can't remember the reaction    Zofran [ondansetron hcl (pf)] Nausea And Vomiting     Pt reports that last time she received zofran she started vomiting again    Methotrexate analogues Nausea Only    Pneumovax 23 [pneumococcal 23-argenis ps vaccine] Other (See Comments)     "sick"     Recently got inserts in the shoes due to left ankle pain     Prior Therapy: aquatic therapy 12/5/10-2/19/20  PT for left hip pain 3/20/19-11/7/20, transfer from PT 7/31/2020-9/17/2020    Social: ; 5 steps to get into raised house with rails; taking one step at a time both feet    Occupation:  and lives home;      Prior Level of Function: before 5-6 months ago she was walking independently and pain free    Current Level of Function: limited walking distances, difficulty cleaning, cooking and gets exhausted walking  Patient's goals: normal walking and pain free    Environmental/Abuse/Neglect/Sensory concerns: None  The patient's spiritual, cultural, social and education needs were considered with no evidence of barriers noted.     Imaging  MRI: 7/20/20 labral tear right hip, gluteus minimus and medius tendinosis      Pain: Current 3/10, worst 9/10 at night before going to bed, best 5/10 (never pain free)  Location: right hip   Description: constantly feels swollen and "bone hitting bone" especially at end of the day  Aggravating Factors: Standing, Walking and Night Time; wakes her " up often at night (when she turns on the right side)  Easing Factors: tramadol (nothing else helps)    Objective   Observation: pt stands with significant L>R calcaneal eversion, pronation    Squat: good posterior weight shift, mild L knee valgus and bilateral foot pronation    Hip Range of Motion:   Left Passive Right Passive   Flexion 120 120*   Abduction 35 35   Extension 10 5   Ext. Rotation 35 35   Int. Rotation 25 10*       Lower Extremity Strength  Right LE  Left LE    Knee extension: 5/5 Knee extension: 5/5   Knee flexion: 5/5 Knee flexion: 5/5   Hip flexion: 4/5 Hip flexion: 4+/5   Hip extension:  3/5 Hip extension: 3/5   Hip abduction: 2/5 Hip abduction: 3/5   Hip adduction: 5/5 Hip adduction 5/5   Ankle dorsiflexion: 5/5 Ankle dorsiflexion: 5/5   Ankle plantarflexion: 5/5 Ankle plantarflexion: 5/5   *LBP with R hip flexion MMT  *Hamstring dominant during hip ext    Special Tests:  SANDRA: Positive R  FADIR: Negative R  SLR positive with internal rotation on R  SLR negative on L    Flexibility:    Ely's test: Positive bilaterally      Joint Mobility: Decreased inferior glide B hips. Hypermobility bilaterally at talocrural, tarsometatarsal and subtalar joints.     Palpation: No pain upon palpation of L metatarsals    Sensation: no sensation deficits    Edema: none present      PROM Right Left Comment   DF: 15 degrees 20 degrees    PF: 50 degrees 45 degrees    Eversion: 25 degrees 25 degrees    Inversion: 45 degrees 45 degrees    1st MTP Ext: 60 degrees 60 degrees    1st MTP Flex: 20 degrees 20 degrees    *pain     Right Left Comment   DF: 5/5 5/5    PF: 5/5 5/5 Did not assess in WB   Eversion: 4+/5 4/5    Inversion: 4+/5 4/5    1st MTP Ext: 5/5 5/5    1st MTP Flex: 5/5 5/5        TREATMENT   Treatment Time In: 2:30 pm  Treatment Time Out: 2:45 pm  Total Treatment time separate from Evaluation: 15 minutes     Joyce received therapeutic exercises to develop strength, endurance, ROM, flexibility and core  "stabilization for 15 minutes including:  TA with march x10 each  Sciatic nerve glide x10 each  Hand heel rocking 10x5"  Short foot 10x5"        Home Exercises and Patient Education Provided     Education provided:   - Pathophysiology, importance of exercise adherence     Written Home Exercises Provided: yes.  Exercises were reviewed and Joyce was able to demonstrate them prior to the end of the session.  Joyce demonstrated good  understanding of the education provided.      See EMR under Patient Instructions for exercises provided 10/19/2020.        Assessment   Joyce Peterson presents with diagnosis of right hip pain. She demonstrates decreased hip mobility, decreased trunk stability, radicular symptoms, ankle and foot hypermobility with decreased arch control, and decreased tolerance for functional mobility. She would benefit from skilled PT services to normalize hip joint mobility, improve core stabilization to dissociate pelvic and lower extremity movement, and to develop improved intrinsic and extrinsic foot control for improved static and dynamic posturing. She will benefit from skilled Physical Therapy services for treatment and education.     Rehab prognosis is: Good.     Patient will benefit from skilled outpatient Physical Therapy to address the deficits stated above and in the chart below, provide patient/family education, and to maximize patient's level of independence.     Medical necessity is demonstrated by the following IMPAIRMENTS:  Continued inability to participate in vocational pursuits, Pain limits function of effected part for some activities, Unable to participate fully in daily activities, Requires skilled supervision to complete and progress HEP and Weakness    Anticipated Barriers for therapy: none    History  Co-morbidities and personal factors that may impact the plan of care Co-morbidities:   CAD, COPD/asthma and RA,  Left hip femur fracture with intramedullary nailing surgically " 2019    Personal Factors:   no deficits     high   Examination  Body Structures and Functions, activity limitations and participation restrictions that may impact the plan of care Body Regions:   back  lower extremities    Body Systems:    gross symmetry  strength  gross coordinated movement  balance  gait  transitions  scar formation    Participation Restrictions:   none    Activity limitations:   Learning and applying knowledge  no deficits    General Tasks and Commands  no deficits    Communication  no deficits    Mobility  lifting and carrying objects  walking    Self care  no deficits    Domestic Life  shopping  cooking  doing house work (cleaning house, washing dishes, laundry)  assisting others    Interactions/Relationships  no deficits    Life Areas  no deficits    Community and Social Life  recreation and leisure         high   Clinical Presentation evolving clinical presentation with changing clinical characteristics moderate   Decision Making/ Complexity Score: moderate     GOALS  Short Term Goals 6 weeks:  - amb 2 blocks on level tile without pain provocation or need for rest  - do SLS >10 sec without LOB or pain  - do anterior step up of 6  without pain provocation  - pt will be able to perform short foot exercise in weightbearing position for improved functional stability    Long Term Goals 12 weeks:   - pt will be able to return to light yoga activities with modifications as necessary  - pt will demonstrate improved hip strength to 4+/5 for improved stability  - pt will be demonstrate negative SLR on RLE for improved neural mobility  - pt will report improved ambulation tolerance with shopping and ADLs for return to her prior level of function    Plan   Plan of care Certification: 10/19/2020 to 1/15/21.    Outpatient Physical Therapy 1-2 times weekly for 12 weeks to include the following interventions: Electrical Stimulation with dry needling, Gait Training, Manual Therapy, Neuromuscular Re-ed,  Patient Education, Therapeutic Activites and Therapeutic Exercise.     Love Lay, PT  10/19/2020

## 2020-10-20 ENCOUNTER — TELEPHONE (OUTPATIENT)
Dept: RHEUMATOLOGY | Facility: CLINIC | Age: 60
End: 2020-10-20

## 2020-10-20 ENCOUNTER — SPECIALTY PHARMACY (OUTPATIENT)
Dept: PHARMACY | Facility: CLINIC | Age: 60
End: 2020-10-20

## 2020-10-20 ENCOUNTER — PATIENT MESSAGE (OUTPATIENT)
Dept: RHEUMATOLOGY | Facility: CLINIC | Age: 60
End: 2020-10-20

## 2020-10-20 DIAGNOSIS — M06.9 RHEUMATOID ARTHRITIS, INVOLVING UNSPECIFIED SITE, UNSPECIFIED WHETHER RHEUMATOID FACTOR PRESENT: Primary | ICD-10-CM

## 2020-10-20 DIAGNOSIS — M06.9 RHEUMATOID ARTHRITIS, INVOLVING UNSPECIFIED SITE, UNSPECIFIED WHETHER RHEUMATOID FACTOR PRESENT: ICD-10-CM

## 2020-10-21 ENCOUNTER — PATIENT OUTREACH (OUTPATIENT)
Dept: ADMINISTRATIVE | Facility: OTHER | Age: 60
End: 2020-10-21

## 2020-10-21 DIAGNOSIS — Z12.31 ENCOUNTER FOR SCREENING MAMMOGRAM FOR MALIGNANT NEOPLASM OF BREAST: Primary | ICD-10-CM

## 2020-10-21 NOTE — PROGRESS NOTES
Health Maintenance Due   Topic Date Due    Pneumococcal Vaccine (Highest Risk) (3 of 3 - PPSV23) 10/31/2014    Influenza Vaccine (1) 08/01/2020    Mammogram  11/19/2020     Updates were requested from care everywhere.  Chart was reviewed for overdue Proactive [facility] Encounters ([facility]) topics (CRS, Breast Cancer Screening, Eye exam)  Health Maintenance has been updated.  LINKS immunization registry triggered.  Immunizations were reconciled.

## 2020-10-22 ENCOUNTER — SPECIALTY PHARMACY (OUTPATIENT)
Dept: PHARMACY | Facility: CLINIC | Age: 60
End: 2020-10-22

## 2020-10-22 NOTE — TELEPHONE ENCOUNTER
Specialty Pharmacy - Refill Coordination    Specialty Medication Orders Linked to Encounter      Most Recent Value   Medication #1  sarilumab (KEVZARA) 200 mg/1.14 mL Syrg (Order#[id number], Rx#[id number])          Refill Questions - Documented Responses      Most Recent Value   Relationship to patient of person spoken to?  Self   HIPAA/medical authority confirmed?  Yes   Any changes in contact preferences or allowed representatives?  No   Has the patient had any insurance changes?  No   Has the patient had any changes to specialty medication, dose, or instructions?  No   Has the patient started taking any new medications, herbals, or supplements?  No   Has the patient been diagnosed with any new medical conditions?  No   Does the patient have any new allergies to medications or foods?  No   Does the patient have any concerns about side effects?  No   Can the patient store medication/sharps container properly (at the correct temperature, away from children/pets, etc.)?  Yes   Can the patient call emergency services (071) in the event of an emergency?  Yes   Does the patient have any concerns or questions about taking or administering this medication as prescribed?  No   How many doses did the patient miss in the past 4 weeks or since the last fill?  0   How many doses does the patient have on hand?  0   How many days does the patient report on hand quantity will last?  0   Does the number of doses/days supply remaining match pharmacy expected amounts?  Yes   How will the patient receive the medication?  Mail   When does the patient need to receive the medication?  10/26/20   Shipping Address  Home   Address in [facility] confirmed and updated if neccessary?  Yes   Expected Copay ($)  10   Is the patient able to afford the medication copay?  Yes   Payment Method  CC on file   Days supply of Refill  28   Would patient like to speak to a pharmacist?  No   Do you want to trigger an intervention?  No   Do you want  to trigger an additional referral task?  No   Refill activity completed?  Yes   Refill activity plan  Refill scheduled   Shipment/Pickup Date:  10/22/20          Current Outpatient Medications   Medication Sig    albuterol (ACCUNEB) 1.25 mg/3 mL Nebu Take 3 mLs (1.25 mg total) by nebulization every 6 (six) hours as needed. Rescue    albuterol (PROVENTIL HFA) 90 mcg/actuation inhaler Inhale 2 puffs into the lungs every 6 (six) hours as needed. Rescue    albuterol-ipratropium (DUO-NEB) 2.5 mg-0.5 mg/3 mL nebulizer solution Take 3 mLs by nebulization every 6 (six) hours as needed for Wheezing. Rescue    aspirin 81 mg Tab Take 81 mg by mouth every morning.     azelastine (ASTELIN) 137 mcg (0.1 %) nasal spray 1 spray (137 mcg total) by Nasal route 2 (two) times daily.    benzonatate (TESSALON) 100 MG capsule Take 1 capsule by mouth three times a day    budesonide-formoterol 160-4.5 mcg (SYMBICORT) 160-4.5 mcg/actuation HFAA Inhale 2 puffs into the lungs every 12 (twelve) hours.    calcium citrate-vitamin D (CITRACAL + D) 315-200 mg-unit per tablet Take 1 tablet by mouth 2 (two) times daily.     ciclopirox (PENLAC) 8 % Soln Apply daily to affected nail. Must remove and restart weekly    diazePAM (VALIUM) 10 MG Tab Take 1 tablet (10 mg total) by mouth once. take before MRI for 1 dose    diclofenac sodium (VOLTAREN) 1 % Gel APPLY 2 GRAMS TOPICALLY 4 (FOUR) TIMES DAILY AS NEEDED.    erythromycin (ROMYCIN) ophthalmic ointment Apply to affected external eyelid tissue two times per day as directed for fourteen days    fluconazole (DIFLUCAN) 100 MG tablet     fluticasone propionate (FLONASE) 50 mcg/actuation nasal spray 2 sprays (100 mcg total) by Each Nostril route once daily.    GUAIFENESIN (MUCINEX ORAL) Take 400 mg by mouth every 4 (four) hours as needed.     INV PROPULSID 10 MG Take 1 tablets (20 mg) by mouth 4 (four) times daily. FOR INVESTIGATIONAL USE ONLY. Protocol [id number]    leflunomide (ARAVA) 20  "MG Tab Take 1 tablet (20 mg total) by mouth once daily.    lifitegrast (XIIDRA) 5 % Dpet Place 1 drop into both eyes 2 (two) times daily.    montelukast (SINGULAIR) 10 mg tablet Take 1 tablet (10 mg total) by mouth nightly.    naproxen (NAPROSYN) 500 MG tablet Take 1 tablet (500 mg total) by mouth 2 (two) times daily as needed.    omeprazole (PRILOSEC) 40 MG capsule Take 1 capsule (40 mg total) by mouth every morning.    omeprazole-sodium bicarbonate (ZEGERID) 40-1.1 mg-gram per capsule Take 1 capsule by mouth every morning.    POTASSIUM ORAL Take by mouth once daily.    predniSONE (DELTASONE) 1 MG tablet TAKE 1 TABLET BY MOUTH EVERY DAY    predniSONE (DELTASONE) 5 MG tablet Take 1 tablet (5 mg total) by mouth once daily.    PREMARIN 0.625 mg tablet Take 0.625 mg by mouth once daily.    PREMARIN vaginal cream PLACE 0.5 GRAM VAGINALLY 3 (THREE) TIMES A WEEK.    progesterone (PROMETRIUM) 100 MG capsule Take 100 mg by mouth every morning. 1 Capsule Oral Every day, morning    RESTASIS 0.05 % ophthalmic emulsion PLACE 0.4 MLS (1 DROP TOTAL) INTO BOTH EYES 2 (TWO) TIMES DAILY.    rizatriptan (MAXALT) 10 MG tablet Take 10 mg by mouth as needed for Migraine.     rosuvastatin (CRESTOR) 20 MG tablet Take 1 tablet (20 mg total) by mouth every evening.    sarilumab (KEVZARA) 200 mg/1.14 mL Syrg Inject 1.14 mLs (200 mg total) into the skin every 14 (fourteen) days.    sucralfate (CARAFATE) 1 gram tablet Take 1 tablet (1 g total) by mouth 4 (four) times daily.    syringe with needle (TUBERCULIN SYRINGE) 1 mL 27 x 1/2" Syrg To administer methotrexate 7.5mg sc once a week    teriparatide (FORTEO) 20 mcg/dose - 600 mcg/2.4 mL PnIj Inject 0.08 mLs (20 mcg total) into the skin once daily.    traMADol (ULTRAM) 50 mg tablet Take 1 tablet (50 mg total) by mouth every 24 hours as needed for Pain.    triamcinolone acetonide 0.1% (KENALOG) 0.1 % ointment aaa qhs , then vaseline and warm towel for flares. Not more than 2 " "weeks straight in same location. Avoid face and groin    trospium (SANCTURA) 20 mg Tab tablet TAKE 1 TABLET BY MOUTH TWICE A DAY. DUE FOR FOLLOW UP IN JULY   Last reviewed on 10/22/2020  9:43 AM by Sonia Galvez    Review of patient's allergies indicates:   Allergen Reactions    Actemra [tocilizumab] Other (See Comments)     Severe dizziness    Codeine Nausea And Vomiting    Gold au 198 Hives and Rash    Hydroxychloroquine Other (See Comments)     Can't remember the reaction      Iodinated contrast media Other (See Comments)     Other reaction(s): BURNING ALL OVER    Iodine Other (See Comments)     Other reaction(s): BURNING ALL OVER - Iodine dye - Not topical    Sulfa (sulfonamide antibiotics) Other (See Comments)     Can't remember the reaction    Zofran [ondansetron hcl (pf)] Nausea And Vomiting     Pt reports that last time she received zofran she started vomiting again    Methotrexate analogues Nausea Only    Pneumovax 23 [pneumococcal 23-argenis ps vaccine] Other (See Comments)     "sick"    Last reviewed on  10/22/2020 9:41 AM by Sonia Galvez      Tasks added this encounter   No tasks added.   Tasks due within next 3 months   11/10/2020 - Refill Call (Auto Added)  1/16/2021 - Clinical - Follow Up Assesement (180 day)     Sonia Galvez  [facility]  [address] [place] [id number]  Phone: [phone]  Fax: [phone]      "

## 2020-10-23 ENCOUNTER — SPECIALTY PHARMACY (OUTPATIENT)
Dept: PHARMACY | Facility: CLINIC | Age: 60
End: 2020-10-23

## 2020-10-23 ENCOUNTER — OFFICE VISIT (OUTPATIENT)
Dept: UROLOGY | Facility: CLINIC | Age: 60
End: 2020-10-23
Payer: MEDICARE

## 2020-10-23 VITALS
HEIGHT: 61 IN | DIASTOLIC BLOOD PRESSURE: 72 MMHG | HEART RATE: 77 BPM | RESPIRATION RATE: 18 BRPM | TEMPERATURE: 98 F | SYSTOLIC BLOOD PRESSURE: 130 MMHG | WEIGHT: 159 LBS | BODY MASS INDEX: 30.02 KG/M2 | OXYGEN SATURATION: 98 %

## 2020-10-23 DIAGNOSIS — N39.41 URGE URINARY INCONTINENCE: ICD-10-CM

## 2020-10-23 DIAGNOSIS — N39.3 SUI (STRESS URINARY INCONTINENCE, FEMALE): ICD-10-CM

## 2020-10-23 DIAGNOSIS — R68.2 DRY MOUTH: ICD-10-CM

## 2020-10-23 DIAGNOSIS — H04.123 DRY EYES: ICD-10-CM

## 2020-10-23 DIAGNOSIS — N95.2 ATROPHIC VAGINITIS: ICD-10-CM

## 2020-10-23 DIAGNOSIS — R35.0 URINARY FREQUENCY: ICD-10-CM

## 2020-10-23 DIAGNOSIS — N39.0 RECURRENT UTI: Primary | ICD-10-CM

## 2020-10-23 DIAGNOSIS — R35.1 NOCTURIA: ICD-10-CM

## 2020-10-23 DIAGNOSIS — N28.89 URETEROCELE: ICD-10-CM

## 2020-10-23 DIAGNOSIS — K59.03 DRUG-INDUCED CONSTIPATION: ICD-10-CM

## 2020-10-23 LAB
BILIRUB UR QL STRIP: NEGATIVE
CLARITY UR REFRACT.AUTO: CLEAR
COLOR UR AUTO: NORMAL
GLUCOSE UR QL STRIP: NEGATIVE
HGB UR QL STRIP: NEGATIVE
KETONES UR QL STRIP: NEGATIVE
LEUKOCYTE ESTERASE UR QL STRIP: NEGATIVE
NITRITE UR QL STRIP: NEGATIVE
PH UR STRIP: 7 [PH] (ref 5–8)
PROT UR QL STRIP: NEGATIVE
SP GR UR STRIP: 1 (ref 1–1.03)
URN SPEC COLLECT METH UR: NORMAL

## 2020-10-23 PROCEDURE — 99215 PR OFFICE/OUTPT VISIT, EST, LEVL V, 40-54 MIN: ICD-10-PCS | Mod: S$GLB,,, | Performed by: UROLOGY

## 2020-10-23 PROCEDURE — 99999 PR PBB SHADOW E&M-EST. PATIENT-LVL V: ICD-10-PCS | Mod: PBBFAC,,, | Performed by: UROLOGY

## 2020-10-23 PROCEDURE — 3008F PR BODY MASS INDEX (BMI) DOCUMENTED: ICD-10-PCS | Mod: CPTII,S$GLB,, | Performed by: UROLOGY

## 2020-10-23 PROCEDURE — 99999 PR PBB SHADOW E&M-EST. PATIENT-LVL V: CPT | Mod: PBBFAC,,, | Performed by: UROLOGY

## 2020-10-23 PROCEDURE — 87077 CULTURE AEROBIC IDENTIFY: CPT

## 2020-10-23 PROCEDURE — 99215 OFFICE O/P EST HI 40 MIN: CPT | Mod: S$GLB,,, | Performed by: UROLOGY

## 2020-10-23 PROCEDURE — 87086 URINE CULTURE/COLONY COUNT: CPT

## 2020-10-23 PROCEDURE — 3008F BODY MASS INDEX DOCD: CPT | Mod: CPTII,S$GLB,, | Performed by: UROLOGY

## 2020-10-23 PROCEDURE — 81003 URINALYSIS AUTO W/O SCOPE: CPT

## 2020-10-23 PROCEDURE — 87186 SC STD MICRODIL/AGAR DIL: CPT

## 2020-10-23 PROCEDURE — 87088 URINE BACTERIA CULTURE: CPT

## 2020-10-23 RX ORDER — METHENAMINE HIPPURATE 1000 MG/1
1 TABLET ORAL 2 TIMES DAILY
Qty: 60 TABLET | Refills: 11 | Status: SHIPPED | OUTPATIENT
Start: 2020-10-23 | End: 2021-12-08

## 2020-10-23 RX ORDER — METHENAMINE HIPPURATE 1000 MG/1
1 TABLET ORAL 2 TIMES DAILY
Qty: 180 TABLET | Refills: 3 | Status: SHIPPED | OUTPATIENT
Start: 2020-10-23 | End: 2021-03-16

## 2020-10-23 RX ORDER — MIRABEGRON 25 MG/1
25 TABLET, FILM COATED, EXTENDED RELEASE ORAL DAILY
Qty: 30 TABLET | Refills: 11 | Status: SHIPPED | OUTPATIENT
Start: 2020-10-23 | End: 2022-05-26 | Stop reason: SDUPTHER

## 2020-10-23 NOTE — LETTER
October 23, 2020        Becca Peters,   [address] [facility] [address]             [facility]  [address] [place] [id number]  Phone: [phone]  Fax: [phone]   Patient: Joyce Peterson   MR Number: [id number]   YOB: 1960   Date of Visit: 10/23/2020     Dear Dr. Peters,     Mrs. Joyce Peterson came to see me in the clinic today.  We made some changes to her medications under care plan and I have attached my note for you.  If you have any questions or concerns please do not hesitate to call.    Sincerely,      Jefry Chiang MD            CC  No Recipients    Enclosure

## 2020-10-23 NOTE — PROGRESS NOTES
Subjective:       Patient ID: Joyce Peterson is a 60 y.o. female.    Chief Complaint: Urinary Tract Infection    59 yo WF with history of recurrent urinary tract infections.  The patient had infections as a adolescent however she had done well through most of her life and recently within the last few years started having recurrent urinary tract infections.  She had been started on anticholinergics with VESIcare and then Solfenicin for overactive bladder with urgency and frequency however the patient is developing significant dry eye, dry mouth and constipation.  The patient also had some stress incontinence with jogging in physical activity.  This had improved somewhat with VESIcare but not with the Solfenicin .  The patient has been change the Solfenicin due to insurance.  Patient is also been placed on vaginal estrogen twice weekly which she has been using.  The patient recently complained of some voiding discomfort her urgency and pelvic pressure.  A urine culture was performed at the urgent care in the neighborhood however no result has been reported as of yet.  The patient is here today for 2nd opinion follow-up and looking to change for doctor closer to home.  Over the last few years the patient has been having at least 3-4 urinary tract infections year and wishes to see if we can decrease this.  The patient cannot relate this to any physical activity or sexual activity.  She is to states that they occur more often than she would like.    Urinary Tract Infection   This is a recurrent problem. The current episode started more than 1 year ago. The problem occurs intermittently. The problem has been unchanged. The quality of the pain is described as aching. The pain is at a severity of 2/10. The pain is mild. There has been no fever. She is sexually active. There is no history of pyelonephritis. Associated symptoms include frequency, urgency and constipation. Pertinent negatives include no behavior changes,  chills, discharge, flank pain, hematuria, hesitancy, nausea, possible pregnancy, sweats, vomiting, weight loss, bubble bath use, rash or withholding. Associated symptoms comments: Nocturia x1, the urinary tract infection is present nocturia can occurs with often as 3 times a night. Treatments tried: Trospium, prescription antibiotics as require, and vaginal estrogen. The treatment provided moderate relief. Her past medical history is significant for recurrent UTIs and a urological procedure. There is no history of catheterization, diabetes insipidus, diabetes mellitus, genitourinary reflux, hypertension, kidney stones, a single kidney, STD or urinary stasis.     Review of Systems   Constitutional: Negative for activity change, appetite change, chills, fatigue, fever and weight loss.   HENT: Negative for congestion, ear pain, hearing loss, nosebleeds, sinus pressure, sore throat and trouble swallowing.    Eyes: Negative for pain and visual disturbance.   Respiratory: Positive for cough (Seasonal Allergies). Negative for apnea and shortness of breath.    Cardiovascular: Negative for chest pain and leg swelling.   Gastrointestinal: Positive for constipation. Negative for abdominal distention, abdominal pain, anal bleeding, blood in stool, diarrhea, nausea, rectal pain and vomiting.   Endocrine: Negative for cold intolerance, heat intolerance, polydipsia, polyphagia and polyuria.   Genitourinary: Positive for frequency and urgency. Negative for decreased urine volume, difficulty urinating, dyspareunia, dysuria, enuresis, flank pain, genital sores, hematuria, hesitancy, menstrual problem, pelvic pain, vaginal bleeding, vaginal discharge and vaginal pain.   Musculoskeletal: Positive for arthralgias (RA), gait problem and joint swelling. Negative for back pain, myalgias, neck pain and neck stiffness.   Skin: Negative for color change, pallor and rash.   Allergic/Immunologic: Negative for environmental allergies, food  allergies and immunocompromised state.   Neurological: Negative for dizziness, speech difficulty, weakness and headaches.   Hematological: Negative for adenopathy. Does not bruise/bleed easily.   Psychiatric/Behavioral: Negative.        Objective:      Physical Exam  Vitals signs and nursing note reviewed.   Constitutional:       General: She is not in acute distress.     Appearance: Normal appearance. She is well-developed. She is not ill-appearing, toxic-appearing or diaphoretic.   HENT:      Head: Normocephalic.      Right Ear: External ear normal.      Left Ear: External ear normal.      Nose: Nose normal.      Mouth/Throat:      Mouth: Mucous membranes are moist.      Pharynx: Oropharynx is clear. No oropharyngeal exudate or posterior oropharyngeal erythema.   Eyes:      General: No scleral icterus.        Right eye: No discharge.         Left eye: No discharge.      Extraocular Movements: Extraocular movements intact.      Conjunctiva/sclera: Conjunctivae normal.      Pupils: Pupils are equal, round, and reactive to light.   Neck:      Musculoskeletal: Normal range of motion and neck supple. No neck rigidity or muscular tenderness.      Vascular: No carotid bruit.   Cardiovascular:      Rate and Rhythm: Normal rate and regular rhythm.      Pulses: Normal pulses.      Heart sounds: Normal heart sounds.   Pulmonary:      Effort: Pulmonary effort is normal. No respiratory distress.      Breath sounds: Normal breath sounds. No stridor. No wheezing, rhonchi or rales.   Chest:      Chest wall: No tenderness.   Abdominal:      General: Bowel sounds are normal. There is no distension.      Palpations: Abdomen is soft. There is no mass.      Tenderness: There is no abdominal tenderness. There is no right CVA tenderness, left CVA tenderness, guarding or rebound.      Hernia: No hernia is present.   Genitourinary:     General: Normal vulva.      Comments: Atrophic Vaginitis  Musculoskeletal: Normal range of motion.          General: No swelling, tenderness, deformity or signs of injury.      Right lower leg: No edema.      Left lower leg: No edema.   Lymphadenopathy:      Cervical: No cervical adenopathy.   Skin:     General: Skin is warm and dry.      Capillary Refill: Capillary refill takes less than 2 seconds.      Coloration: Skin is not jaundiced or pale.      Findings: No bruising, erythema, lesion or rash.   Neurological:      Mental Status: She is alert and oriented to person, place, and time.      Cranial Nerves: No cranial nerve deficit.      Sensory: No sensory deficit.      Motor: No weakness.      Coordination: Coordination normal.      Gait: Gait normal.      Deep Tendon Reflexes: Reflexes are normal and symmetric. Reflexes normal.   Psychiatric:         Mood and Affect: Mood normal.         Behavior: Behavior normal.         Thought Content: Thought content normal.         Judgment: Judgment normal.         Assessment:       1. Recurrent UTI    2. Dry eyes    3. Atrophic vaginitis    4. Drug-induced constipation    5. Dry mouth    6. Nocturia    7. Urinary frequency    8. Ureterocele    9. Urge urinary incontinence    10. RADHA (stress urinary incontinence, female)        Plan:       Patient Instructions   Stop Trospium due to side effects.  Start Myrbetriq 25 mg daily  Start Urex 1 g twice daily urinary tract infection prevention  Continue vaginal estrogen cream twice weekly  Urinalysis and urine culture in the clinic today  Will call results and placed on antibiotics if necessary  Follow-up in 3 months for re-evaluation.

## 2020-10-23 NOTE — PATIENT INSTRUCTIONS
Stop Trospium due to side effects.  Start Myrbetriq 25 mg daily  Start Urex 1 g twice daily urinary tract infection prevention  Continue vaginal estrogen cream twice weekly  Urinalysis and urine culture in the clinic today  Will call results and placed on antibiotics if necessary  Follow-up in 3 months for re-evaluation.

## 2020-10-26 ENCOUNTER — PATIENT MESSAGE (OUTPATIENT)
Dept: UROLOGY | Facility: CLINIC | Age: 60
End: 2020-10-26

## 2020-10-26 ENCOUNTER — CLINICAL SUPPORT (OUTPATIENT)
Dept: REHABILITATION | Facility: HOSPITAL | Age: 60
End: 2020-10-26
Payer: MEDICARE

## 2020-10-26 ENCOUNTER — TELEPHONE (OUTPATIENT)
Dept: UROLOGY | Facility: CLINIC | Age: 60
End: 2020-10-26

## 2020-10-26 ENCOUNTER — OFFICE VISIT (OUTPATIENT)
Dept: SPORTS MEDICINE | Facility: CLINIC | Age: 60
End: 2020-10-26
Payer: MEDICARE

## 2020-10-26 VITALS
DIASTOLIC BLOOD PRESSURE: 78 MMHG | SYSTOLIC BLOOD PRESSURE: 127 MMHG | WEIGHT: 158 LBS | HEIGHT: 61 IN | BODY MASS INDEX: 29.83 KG/M2 | HEART RATE: 101 BPM

## 2020-10-26 DIAGNOSIS — Z74.09 IMPAIRED FUNCTIONAL MOBILITY, BALANCE, GAIT, AND ENDURANCE: ICD-10-CM

## 2020-10-26 DIAGNOSIS — G89.29 CHRONIC RIGHT HIP PAIN: Primary | ICD-10-CM

## 2020-10-26 DIAGNOSIS — M25.551 CHRONIC RIGHT HIP PAIN: Primary | ICD-10-CM

## 2020-10-26 DIAGNOSIS — M16.11 OSTEOARTHRITIS OF RIGHT HIP, UNSPECIFIED OSTEOARTHRITIS TYPE: ICD-10-CM

## 2020-10-26 DIAGNOSIS — M70.61 TROCHANTERIC BURSITIS OF RIGHT HIP: ICD-10-CM

## 2020-10-26 LAB — BACTERIA UR CULT: ABNORMAL

## 2020-10-26 PROCEDURE — 97110 THERAPEUTIC EXERCISES: CPT

## 2020-10-26 PROCEDURE — 99999 PR PBB SHADOW E&M-EST. PATIENT-LVL V: ICD-10-PCS | Mod: PBBFAC,,, | Performed by: PHYSICIAN ASSISTANT

## 2020-10-26 PROCEDURE — 3008F PR BODY MASS INDEX (BMI) DOCUMENTED: ICD-10-PCS | Mod: CPTII,S$GLB,, | Performed by: PHYSICIAN ASSISTANT

## 2020-10-26 PROCEDURE — 99214 OFFICE O/P EST MOD 30 MIN: CPT | Mod: S$GLB,,, | Performed by: PHYSICIAN ASSISTANT

## 2020-10-26 PROCEDURE — 3008F BODY MASS INDEX DOCD: CPT | Mod: CPTII,S$GLB,, | Performed by: PHYSICIAN ASSISTANT

## 2020-10-26 PROCEDURE — 97112 NEUROMUSCULAR REEDUCATION: CPT

## 2020-10-26 PROCEDURE — 99214 PR OFFICE/OUTPT VISIT, EST, LEVL IV, 30-39 MIN: ICD-10-PCS | Mod: S$GLB,,, | Performed by: PHYSICIAN ASSISTANT

## 2020-10-26 PROCEDURE — 99999 PR PBB SHADOW E&M-EST. PATIENT-LVL V: CPT | Mod: PBBFAC,,, | Performed by: PHYSICIAN ASSISTANT

## 2020-10-26 NOTE — PROGRESS NOTES
"Physical Therapy (PT) Daily Treatment Note     Name: Joyce Peterson  Clinic Number: [id number]    Therapy Diagnosis:   Encounter Diagnosis   Name Primary?    Impaired functional mobility, balance, gait, and endurance      Physician: Jett Julien III, *    Visit Date: 10/26/2020    Physician Orders: PT Eval and TreatInstructed of HEP, formal PT session if wanted by patient and definitely dry needling of the right hip flexor, bursa, and glutes for likely external hip pain.    .   Medical Diagnosis from Referral: pain in right hip  Evaluation Date: 7/31/2020  Authorization Period Expiration:11/4/20   Plan of Care Expiration: 1/15/2021  Visit # / Visits authorized:11/ 20      Time In: 4:15 pm  Time Out: 5:00 pm  Total Billable Time: 23 minutes    Precautions: history of multiple stress fractures    Subjective     Patient reports: her foot continues to be her primary concern, as that starts her pain, and her subsequent limp causes increased hip and back pain.   She was compliant with home exercise program.  Functional change: none yet    Pain: 3/10  Location: right hip      Objective     Joyce received therapeutic exercises to develop strength, endurance and core stabilization for 25 minutes including:  Sciatic nerve glide x10 each  Short foot 10x5"  Towel scrunches 20x5"  Seated neutral talar positioning 20x5"  Standing neutral talar positioning 20x5"    Joyce received neuromuscular re-education for 10 minutes to improve coordination, including:  TA set with march 3x10 each  TA set with heel slide 2x10 each  Standing minisquat with palloff press 2x10 each 7#    Joyce received manual therapy for 10 minutes, including:   Inferior femoral glides, grade III    Home Exercise Program (HEP) Provided and Patient Education Provided     Patient was provided education on follow up with MD about foot pain and ongoing left hip pain.     Written Home Exercises Provided: Patient instructed to cont prior home " program.  Exercises were reviewed and Joyce was able to demonstrate them prior to the end of the session.  Joyce demonstrated good  understanding of the education provided.     Assessment     Pt with improved core and foot intrinsic control compared to evaluation, but still does not have the strength to perform these contractions against gravity during prolonged functional activities. Reported some back soreness with palloff press, improved with limiting range.   Joyce is progressing well towards her goals.     Patient will continue to benefit from skilled outpatient physical therapy to address the deficits listed in the problem list box on initial evaluation, provide patient/family education and to maximize patient's level of independence in the home and community environment.     Patient prognosis is Good.     Patient's spiritual, cultural and educational needs considered and pt agreeable to plan of care and goals.    Anticipated barriers to physical therapy: foot pain    Goals: Short Term Goals (3-4 weeks):  1. Patient will have decreased complaints of right hip pain to 3/10 at rest and 5/10 at worst at the end of the day.progressing not met   2. Patient will ambulate without a trendelenburg gait  using an assistive device (single point cane) 300 feet indoors. MET no longer uses AD 9/8/20  3. Patient will be independent and compliant with issued HEP.MET 8/11/20  4. Patient will have 75% decreased tenderness to palpation of the right greater trochanter.     Long Term Goals (4-6 weeks):   1. Patient will be pain free right hip at rest and no more than 3/10 at worst at end of the day  2. Patient will have improved strength of the right hip to 5/5 flexion/abduction/extension for increased ability to perform activities of daily living.  3. Patient will be able to resume prior functional level with no deficits, including normal gait without assistive device.   4. Patient will be able to ascend/descend steps in  clinic foot over foot without hand rails       Plan        Plan of care Certification: 7/31/2020 to 9/30/20.     Outpatient Physical Therapy 2 times weekly for 6 weeks to include the following interventions: Electrical Stimulation with dry needling, Gait Training, Manual Therapy, Neuromuscular Re-ed, Patient Education and Therapeutic Exercise       Love Lay, PT

## 2020-10-26 NOTE — TELEPHONE ENCOUNTER
----- Message from Jefry Chiang MD sent at 10/26/2020 12:21 PM CDT -----  Notify patient that prescription is at the pharmacy for Levaquin    Urine culture revealed a resistant E coli.  Levaquin seems to be very appropriate and the treatment of this bacteria.  I prescribed to 2 week dose to ensure that is treated completely.

## 2020-10-27 ENCOUNTER — PATIENT MESSAGE (OUTPATIENT)
Dept: RHEUMATOLOGY | Facility: CLINIC | Age: 60
End: 2020-10-27

## 2020-10-28 ENCOUNTER — OFFICE VISIT (OUTPATIENT)
Dept: RHEUMATOLOGY | Facility: CLINIC | Age: 60
End: 2020-10-28
Payer: MEDICARE

## 2020-10-28 ENCOUNTER — OFFICE VISIT (OUTPATIENT)
Dept: ORTHOPEDICS | Facility: CLINIC | Age: 60
End: 2020-10-28
Payer: MEDICARE

## 2020-10-28 VITALS
WEIGHT: 160 LBS | SYSTOLIC BLOOD PRESSURE: 129 MMHG | DIASTOLIC BLOOD PRESSURE: 84 MMHG | HEIGHT: 61 IN | HEART RATE: 102 BPM | BODY MASS INDEX: 30.21 KG/M2

## 2020-10-28 DIAGNOSIS — M79.7 FIBROMYALGIA: ICD-10-CM

## 2020-10-28 DIAGNOSIS — M81.8 STEROID-INDUCED OSTEOPOROSIS: Chronic | ICD-10-CM

## 2020-10-28 DIAGNOSIS — M15.9 OSTEOARTHRITIS INVOLVING MULTIPLE JOINTS ON BOTH SIDES OF BODY: ICD-10-CM

## 2020-10-28 DIAGNOSIS — M21.42 PES PLANOVALGUS, ACQUIRED, LEFT: ICD-10-CM

## 2020-10-28 DIAGNOSIS — M66.872 NONTRAUMATIC RUPTURE OF POSTERIOR TIBIAL TENDON, LEFT: Primary | ICD-10-CM

## 2020-10-28 DIAGNOSIS — M06.9 RHEUMATOID ARTHRITIS INVOLVING MULTIPLE SITES, UNSPECIFIED WHETHER RHEUMATOID FACTOR PRESENT: Primary | Chronic | ICD-10-CM

## 2020-10-28 DIAGNOSIS — M06.9 RHEUMATOID ARTHRITIS: ICD-10-CM

## 2020-10-28 DIAGNOSIS — T38.0X5A STEROID-INDUCED OSTEOPOROSIS: Chronic | ICD-10-CM

## 2020-10-28 DIAGNOSIS — M19.079 ARTHRITIS OF FOOT: ICD-10-CM

## 2020-10-28 PROCEDURE — 99213 PR OFFICE/OUTPT VISIT, EST, LEVL III, 20-29 MIN: ICD-10-PCS | Mod: S$GLB,,, | Performed by: ORTHOPAEDIC SURGERY

## 2020-10-28 PROCEDURE — 3008F BODY MASS INDEX DOCD: CPT | Mod: CPTII,S$GLB,, | Performed by: INTERNAL MEDICINE

## 2020-10-28 PROCEDURE — 99999 PR PBB SHADOW E&M-EST. PATIENT-LVL V: CPT | Mod: PBBFAC,,, | Performed by: INTERNAL MEDICINE

## 2020-10-28 PROCEDURE — 99213 OFFICE O/P EST LOW 20 MIN: CPT | Mod: S$GLB,,, | Performed by: ORTHOPAEDIC SURGERY

## 2020-10-28 PROCEDURE — 99999 PR PBB SHADOW E&M-EST. PATIENT-LVL V: ICD-10-PCS | Mod: PBBFAC,,, | Performed by: INTERNAL MEDICINE

## 2020-10-28 PROCEDURE — 99214 PR OFFICE/OUTPT VISIT, EST, LEVL IV, 30-39 MIN: ICD-10-PCS | Mod: S$GLB,,, | Performed by: INTERNAL MEDICINE

## 2020-10-28 PROCEDURE — 99214 OFFICE O/P EST MOD 30 MIN: CPT | Mod: S$GLB,,, | Performed by: INTERNAL MEDICINE

## 2020-10-28 PROCEDURE — 3008F PR BODY MASS INDEX (BMI) DOCUMENTED: ICD-10-PCS | Mod: CPTII,S$GLB,, | Performed by: INTERNAL MEDICINE

## 2020-10-28 PROCEDURE — 99999 PR PBB SHADOW E&M-EST. PATIENT-LVL I: CPT | Mod: PBBFAC,,, | Performed by: ORTHOPAEDIC SURGERY

## 2020-10-28 PROCEDURE — 99999 PR PBB SHADOW E&M-EST. PATIENT-LVL I: ICD-10-PCS | Mod: PBBFAC,,, | Performed by: ORTHOPAEDIC SURGERY

## 2020-10-28 RX ORDER — LEFLUNOMIDE 20 MG/1
20 TABLET ORAL DAILY
Qty: 90 TABLET | Refills: 0 | Status: SHIPPED | OUTPATIENT
Start: 2020-10-28 | End: 2021-01-27 | Stop reason: SDUPTHER

## 2020-10-28 RX ORDER — DULOXETIN HYDROCHLORIDE 20 MG/1
20 CAPSULE, DELAYED RELEASE ORAL DAILY
Qty: 30 CAPSULE | Refills: 11 | Status: SHIPPED | OUTPATIENT
Start: 2020-10-28 | End: 2021-01-27

## 2020-10-28 ASSESSMENT — ROUTINE ASSESSMENT OF PATIENT INDEX DATA (RAPID3)
MDHAQ FUNCTION SCORE: 0.6
WHEN YOU AWAKENED IN THE MORNING OVER THE LAST WEEK, PLEASE INDICATE THE AMOUNT OF TIME IT TAKES UNTIL YOU ARE AS LIMBER AS YOU WILL BE FOR THE DAY: 24
PATIENT GLOBAL ASSESSMENT SCORE: 5
TOTAL RAPID3 SCORE: 4
PSYCHOLOGICAL DISTRESS SCORE: 1.1
FATIGUE SCORE: 5
AM STIFFNESS SCORE: 1, YES
PAIN SCORE: 5

## 2020-10-28 NOTE — PROGRESS NOTES
Joyce Peterson  Returns today for follow-up.  This is a 60-year-old female with severe rheumatoid arthritis and osteoporosis who has had previous history of stress fractures in her feet.  She presented to me recently with acquired pes planovalgus secondary to posterior tibial tendon dysfunction.  I ordered an MRI after her last visit which did not reveal any new stress fractures but show significant inflammatory changes of the plantar fascia as well as tendinosis of the distal posterior tibial tendon.  She is also noted have significant degenerative changes throughout the midfoot and hindfoot.  She states since her last visit she has been going to physical therapy and has a new therapist who is helping her a good bit.  She also reports that she has been alternating her orthotics and is overall doing better although she has continued pain of her heel in medial hindfoot.  In addition she has some intermittent burning pain the lateral aspect of her heel.    Examination:  She continues with pes planovalgus alignment of her left foot.  She has decreased hindfoot motion and has tenderness along the course of the posterior tibial tendon and the plantar fascia.  She also has some tenderness laterally over the abductor digiti minimi muscle    Impression:  1.  Stage III left posterior tibial tendon dysfunction                       2.  Rheumatoid arthritis    Recommendation:  At this point she will continue with alternating her orthotics as well as her physical therapy.  We have had discussion about obtaining more custom AFO type bracing.  Surgically she would require a triple arthrodesis but she wants to avoid surgery.    Follow-up in 3 months if necessary

## 2020-10-28 NOTE — PROGRESS NOTES
Subjective:       Patient ID: Joyce Peterson is a 60 y.o. female with a PMH notable for HLD,  OA, osteoporosis, RA, and fibromyalgia here for routine follow up    Chief Complaint: Rheumatoid Arthritis, Fibromyalgia, and Follow-up    HPI   Last Visit 7/8/2020: Had R greater trochanter injection, RA was well controlled, TJ4 SJ0 and DAS28 ESR 2.1, decreased prednisone 7mg daily to 6mg daily.     Today's Visit:   Reports improvement in rheumatoid arthritis, but still having pain, believes it is mostly from OA. Pain is worst in BL hips, R > L, and L foot. MRI L foot revealed Post tib tenditis, received a cortisone shot and now doing PT for both L foot and bilateral hips, has had 2 sessions, feels that experiencing mild improvement with PT.      Currently taking 6mg prednisone and naproxen 500 mg BID daily. Taking omeprazole-sodium bicarb, prilosec, progesterone. Denies reflux, abdominal pain, melena, hematochezia. Takes tramadol for break through pain only once every couple of months, only takes a 1/4th at time.    Hx of recurrent UTIs, has an active UTI currently, prescribed levofloxacin to start in the next few dose.     High dose flu in 1 we                                  Widespread pain index  Note the areas which the patient has had pain over the last week:                   Shoulder-girdle, left               Shoulder-girdle, right                         Upper arm left                       Upper arm right                         Lower arm left                       Lower arm right    Hip (buttock, trochanter) left x  Hip (buttock, trochanter) right x                           Upper leg, left x                         Upper leg, right x                           Lower leg, left                          Lower leg, right                                     Jaw, left x                                   Jaw, right x                                        Chest                                   Abdomen             "                    Upper back                               Lower back x                                        Neck x  Score will be from 0-19: 8                                         Symptom severity score  Fatigue: 2   Waking Unrefreshed: 2  Cognitive Symptoms: 3   0 = no problem, 1=slight or mild problem 2= moderate; considerable problems often present and/or at a moderate level, 3 = severe, pervasive, continuous, life disturbing problem  For each of the 3 symptoms, indicate the level of severity over the past week using the Scale.  The symptom severity score is the sum of the severity of the 3 symptoms (fatigue, waking unrefreshed, and cognitive symptoms) plus the number of the following symptoms occurring during the previous 6 months:   Headaches: 1  Pain or cramps in the lower abdomen: 0  Depression: 0  The final score is between 0 and 12: 8                                          Criteria  Patient has fibromyalgia if the following 3 conditions are met:  1.  Widespread pain index greater than or equal to 7 and symptom severity score greater than or equal to 5 or widespread pain index between 3- 6, and symptom severity score greater than or equal to 9.    2.  Symptoms have been present in a similar level for at least 3 months  3.  The patient does not have a disorder that would otherwise sufficiently explain the pain        Review of Systems      Objective:   /84   Pulse 102   Ht 5' 1.2" (1.554 m)   Wt 72.6 kg (160 lb)   LMP  (LMP Unknown)   BMI 30.03 kg/m²      Physical Exam   Constitutional: She is oriented to person, place, and time and well-developed, well-nourished, and in no distress. No distress.   Eyes: Conjunctivae are normal. Right eye exhibits no discharge. Left eye exhibits no discharge. No scleral icterus.   Cardiovascular: Normal rate, regular rhythm and normal heart sounds.  Exam reveals no gallop and no friction rub.    No murmur heard.  Pulmonary/Chest: Effort normal and breath " sounds normal. No respiratory distress. She has no wheezes. She has no rales.   Abdominal: Soft. She exhibits no distension. There is no abdominal tenderness. There is no rebound and no guarding.       Right Side Rheumatological Exam     Muscle Strength (0-5 scale):  Deltoid:  5  Biceps: 5/5   Triceps:  5  : 5/5   Iliopsoas: 5  Quadriceps:  5   Distal Lower Extremity: 5    Left Side Rheumatological Exam     Muscle Strength (0-5 scale):  Deltoid:  5  Biceps: 5/5   Triceps:  5  :  5/5   Iliopsoas: 5  Quadriceps:  5   Distal Lower Extremity: 5      Neurological: She is alert and oriented to person, place, and time.   Reflex Scores:       Patellar reflexes are 2+ on the right side and 2+ on the left side.       Achilles reflexes are 2+ on the right side and 2+ on the left side.  Skin: She is not diaphoretic.     Psychiatric: Affect normal.   Musculoskeletal:      Comments: Normal hip range of motion, no pain with passive or active range of motion  + pain of greater trochanter R > L  Pronation of ankle while standing L > R               9/24/2019 1/13/2020 7/8/2020 10/28/2020   GURROLA-28 (ESR) -- 3.04 (Low disease activity) 2.1 (Remission) 0.7 (Remission)   GURROLA-28 (CRP) -- 3.1 (Low disease activity) 3.09 (Low disease activity) 1.69 (Remission)   Tender (GURROLA-28) 8 / 28  6 / 28  4 / 28  0 / 28    Swollen (GURROLA-28) 0 / 28  0 / 28  0 / 28  0 / 28    Provider Global -- 10 mm 40 mm 10 mm   Patient Global 7 mm 50 mm 70 mm 50 mm   ESR -- 4 mm/hr 1 mm/hr 1 mm/hr   CRP -- 0.2 mg/L 0.1 mg/L 0.1 mg/L     LABS:     Chemistry        Component Value Date/Time     10/26/2020 1035    K 3.7 10/26/2020 1035     10/26/2020 1035    CO2 28 10/26/2020 1035    BUN 18 (H) 10/26/2020 1035    CREATININE 0.74 10/26/2020 1035     10/26/2020 1035        Component Value Date/Time    CALCIUM 9.1 10/26/2020 1035    ALKPHOS 73 10/26/2020 1035    AST 41 10/26/2020 1035    ALT 30 10/26/2020 1035    BILITOT 0.7 10/26/2020 1035     ESTGFRAFRICA >60.0 10/26/2020 1035    EGFRNONAA >60.0 10/26/2020 1035        Lab Results   Component Value Date    WBC 5.24 10/26/2020    RBC 4.47 10/26/2020    HGB 14.1 10/26/2020    MCV 98 10/26/2020    MCH 31.5 (H) 10/26/2020    MCHC 32.1 10/26/2020    RDW 13.0 10/26/2020     10/26/2020    MPV 10.8 10/26/2020    GRAN 3.0 10/26/2020    GRAN 56.8 10/26/2020    LYMPH 1.2 10/26/2020    LYMPH 23.5 10/26/2020    MONO 0.9 10/26/2020    MONO 17.6 (H) 10/26/2020    EOS 0.0 10/26/2020    BASO 0.07 10/26/2020     Results for TONNY GOMEZ (MRN [id number]) as of 10/28/2020 09:12   Ref. Range 10/26/2020 10:34   CRP Latest Ref Range: 0.0 - 8.2 mg/L <0.1     Results for TONNY GOMEZ (MRN [id number]) as of 10/28/2020 09:12   Ref. Range 10/26/2020 10:34   Sed Rate Latest Ref Range: 0 - 20 mm/Hr 1      Results for TONNY GOMEZ (MRN [id number]) as of 10/28/2020 09:12   Ref. Range 10/23/2020 10:17   Specimen UA Unknown Urine, Clean Catch   Color, UA Latest Ref Range: Yellow, Straw, Flores  Straw   Appearance, UA Latest Ref Range: Clear  Clear   Specific [place], UA Latest Ref Range: 1.005 - 1.030  1.005   pH, UA Latest Ref Range: 5.0 - 8.0  7.0   Protein, UA Latest Ref Range: Negative  Negative   Glucose, UA Latest Ref Range: Negative  Negative   Ketones, UA Latest Ref Range: Negative  Negative   Occult Blood UA Latest Ref Range: Negative  Negative   NITRITE UA Latest Ref Range: Negative  Negative   Bilirubin (UA) Latest Ref Range: Negative  Negative   Leukocytes, UA Latest Ref Range: Negative  Negative     Results for TONNY GOMEZ (MRN [id number]) as of 10/28/2020 09:22   Ref. Range 11/8/2019 08:47   Cholesterol Latest Ref Range: 120 - 199 mg/dL 209 (H)   HDL Latest Ref Range: 40 - 75 mg/dL 96 (H)   HDL/Cholesterol Ratio Latest Ref Range: 20.0 - 50.0 % 45.9   LDL Cholesterol External Latest Ref Range: 63.0 - 159.0 mg/dL 78.2   Non-HDL Cholesterol Latest Units: mg/dL 113   Total Cholesterol/HDL Ratio Latest Ref  Range: 2.0 - 5.0  2.2   Triglycerides Latest Ref Range: 30 - 150 mg/dL 174 (H)     IMAGING:  MRI L Foot 9/24/2020  FINDINGS:  Ligaments: The anterior and posterior inferior tibiofibular ligaments appear intact.  The anterior and posterior talofibular ligaments appear intact.  There is heterogeneity of the deep medial deltoid component suggesting sequela of prior injury.  Spring ligament appears intact.  Calcaneofibular ligament appears intact.  Lisfranc ligament appears intact.     Tendons: There is thickened heterogeneity and fiber indistinctness of the distal posterior tibial tendon near the navicular insertion concerning high-grade partial tear.  Component of short-segment split tear noted just proximal at the level of the medial malleolus.  (Series 2001, image 11).  There is increased peritendinous fluid of the posterior tibial and flexor digitorum.  There is mild tendinous fluid of the peroneal compartment which appears intact.  Dorsal extensor tendons appear intact.  Achilles tendon appears intact.     Bones and joints: Midfoot and hindfoot degenerative change, also involving several TMTs.  Subcortical cystic change of the calcaneus with overlying chondral loss and DJD at the level of the subtalar middle facet.  Mild marrow edema of the anterior calcaneus near the cuboid articulation without discrete fracture lucency, potentially degenerative in nature.  Incidental os trigonum.  Mild tibiotalar and posterior joint fluid.  Tibiotalar and posterior subtalar cartilage appear maintained.     Miscellaneous: Heterogeneous signal of the sinus tarsi fat.  There is thickened heterogeneity of the calcaneal plantar fascial attachment.  Partial tearing of the medial central slip at the calcaneal attachment with sub enthesopathic calcaneal edema.     Impression:     No convincing evidence for calcaneal stress fracture.     Inflammatory changes at the plantar fascial calcaneal attachment with associated partial tearing of  the medial central slip in keeping with spectrum of plantar fasciitis.     Thickened heterogeneity and fiber indistinctness of the distal posterior tibialis tendon near the insertion concerning for high-grade partial tear.  Tenosynovitis of the posterior tibialis, flexor digitorum, and peroneal tendons.     Degenerative changes throughout the mid and hindfoot including the subtalar middle facet and multiple TMTs.       Assessment:       1. Rheumatoid arthritis involving multiple sites, unspecified whether rheumatoid factor present    2. Steroid-induced osteoporosis    3. Rheumatoid arthritis    4. Osteoarthritis involving multiple joints on both sides of body    5. Fibromyalgia            Plan:       Problem List Items Addressed This Visit        Orthopedic    Steroid-induced osteoporosis (Chronic)     Osteoporosis given hip fracture.   - Scheduled for denosumab infusion early November  - Continue teriparatide 20 mcg daily  - Continue OTC calcium-vitd D supplements          Rheumatoid arthritis involving multiple sites - Primary (Chronic)     TJ 0; SJ 0; ESR 1, CRP 0; GURROLA 28 ESR 0.7; GURROLA 28 CRP 1.69; Remission. ESR and CRP may be artificially lowered due to IL-6 inhibition, though exam findings consistent with disease activity in remission.  - Continue sarilumab 200mg injection [id number]  - Continue leflunomide 20mg daily  - Obtain high dose flu vaccine in 1 week in between doses          Osteoarthritis involving multiple joints on both sides of body     Major source of pain for patient. Controlling with NSAID, renal function wnl and denies abdominal pain, melena, and hematochezia.   - Continue naproxen 500mg BID   - Continue gastroprotective regimen: omeprazole-sodium bicarb, prilosec, progesterone.         Fibromyalgia     WSI 8, SSS 8: Multiple structural and pathophysiologic sources of pain including RA, OA, and tendinitis. Central sensitization to pain likely contributing to patient symptom experience and  presentation.   - Recommended Rodrigo Chi  - Start cymbalta 20mg daily              Other Visit Diagnoses     Rheumatoid arthritis        Relevant Medications    leflunomide (ARAVA) 20 MG Tab        I performed a history, review of systems, and physical exam with the patient. The assessment and plan were discussed and agreed upon with Dr. Moran, the attending of record, before sharing with the patient. The face to face encounter time, including answering all patient questions, was 45 minutes.     Eliseo Gutierrez, PGY 1

## 2020-10-28 NOTE — ASSESSMENT & PLAN NOTE
Major source of pain for patient. Controlling with NSAID, renal function wnl and denies abdominal pain, melena, and hematochezia.   - Continue naproxen 500mg BID   - Continue gastroprotective regimen: omeprazole-sodium bicarb, prilosec, progesterone.

## 2020-10-28 NOTE — ASSESSMENT & PLAN NOTE
TJ 0; SJ 0; ESR 1, CRP 0; GURROLA 28 ESR 0.7; GURROLA 28 CRP 1.69; Remission. ESR and CRP may be artificially lowered due to IL-6 inhibition, though exam findings consistent with disease activity in remission.  - Continue sarilumab 200mg injection [id number]  - Continue leflunomide 20mg daily  - Obtain high dose flu vaccine in 1 week in between doses

## 2020-10-28 NOTE — ASSESSMENT & PLAN NOTE
Osteoporosis given hip fracture.   - Scheduled for denosumab infusion early November  - Continue teriparatide 20 mcg daily  - Continue OTC calcium-vitd D supplements

## 2020-10-28 NOTE — ASSESSMENT & PLAN NOTE
WSI 8, SSS 8: Multiple structural and pathophysiologic sources of pain including RA, OA, and tendinitis. Central sensitization to pain likely contributing to patient symptom experience and presentation.   - Recommended Rodrigo Chi  - Start cymbalta 20mg daily

## 2020-10-28 NOTE — PROGRESS NOTES
I have personally taken the history and examined the patient and agree with the resident,s note as stated above               Erosive deforming  RA: TJ 0  SJ 0  Pt global 50 ESR 1  CRP < 0.1  DAS28  0.7 [id number] 1.69((both remission)  CDAI 6(LDA).  discontinued long term methotrexate b/o nausea with po and sc and reduced/minimal  dose, allergic to sulfa, and had reaction to hydroxychloroquine;   tolerating leflunomide and tolerated tofacitinib but not efficacious  Has failed 3 TNFi(infliximab, etanercept, adalimumab), abatacept, hypogamma with rituximab and gets frequent infections in any event, acute vertigo/dizziness with tocilizumab x2 but tolerating sarilumab just fine. If sarilumab loses efficacy, upadacitinib would be next option  Bilateral  gluteus medius tendinitis  LBP with multilevel DDD, severe spinal stenosis L4-5  Major problem at present  S/p bilateral facet injections, scheduled for LESI  Osteopenia/osteoporosis on teriparatide and  denosumab 60mg sc q 6 mo., next due 11/2//20   Fibromyalgia WPI 8 SSS 8 today       *high dose flu vaccine in 1wk(in between sarilumab dosing)  Had allergic reaction to Pneumovax so no booster  Trial of addition of duloxetine 20mg daily(she wants to start lowest possible dose) for fibromyalgia, chronic musculoskeletal pain. Adjust dose as tolerated/needed.   Cont sarilumab 200mg q 2 wks  Cont leflunomide 20mg daily  Decrease prednisone 5 mg q am  Cont naproxen 500mg twice daily prn with omeprazole 40mg daily  Diclofenac gel 1% prn  Next denosumab 60mg sc 11/2//20  Cont teriparatide 20 mcg sc daily  RTC 3 months with standing labs

## 2020-10-28 NOTE — PATIENT INSTRUCTIONS
- Recommend doing Rodrigo Chi to help with pain, look up Rodrigo Chi for beginners on YouTube or the internet  - Continue physical therapy for hip pain and L foot posterior tibial tenditis   - Start Cymbalta 20mg daily for chronic pain and fibromyalgia, if tolerating well, we can increase the dosage   - No need to stop sarilumab  - Decrease prednisone to 5mg daily   - Continue leflunomide and folate  - Prolia infusion Early November   - Obtain high dose flu vaccine in 1 week in between the sarilumab injections   - Continue taking naproxen and gastro protection regimen, notify us if new onset abdominal pain and/or blood in the stool  - Continue teriparatide

## 2020-10-29 ENCOUNTER — PATIENT MESSAGE (OUTPATIENT)
Dept: REHABILITATION | Facility: HOSPITAL | Age: 60
End: 2020-10-29

## 2020-10-31 NOTE — PROGRESS NOTES
"CC: right hip pain    HPI:   Joyce Peterson is a pleasant 60 y.o. patient who reports to clinic with right hip pain. No [facility]    PMH of Rheumatoid Arthritis, Immunosuppressed status, CAD, and steroid-induced osteoporosis.      Presents to clinic with 2 months of anterior lateral right hip pain that is gradually worsening despite conservative management. We have ruled out stress reaction or stress fracture of the right hip previously. She now reports lateral right hip pain. Standing and laying on the hip makes the pain worse. She describes the pain as a constant ache. Her pain is worse in the evening. She has tried tylenol, rest, and HEP with no pain improvement.     Last saw her 4 weeks ago and gave her a 40mg kenalog right trochanteric bursa reaction. She reports not immediate pain relief for 1 week and then good pain relief after that. She reports a SANE score of 15 prior to injection and now SANE score of 50 after injection. She can now sleep at night without pain. She says pain is "much better".    She rates her pain at a 3/10 today.     She had a previous right trochanteric bursa steroid injection by Dr. Moran on 7/8/20 with 50% same day pain relief but this wore off a few days later.     She occasionally has some tingling from her back to her buttocks and posterior thigh to knee.     Pain is no longer affecting ADLs.      Previous left hip surgery for chronic stress fracture on 5/21/2019. doing great  OPERATIVE NOTE     DOS:               05/21/2019     Preop Dx:        Left intertrochanteric femur fracture     Postop Dx:      Left intertrochanteric femur fracture     Procedure:      Cephalomedullary nail fixation of left intertrochanteric femur fracture - 95707       She has a PMH of Rheumatoid Arthritis see by Dr. Moran.  She is on chronic prednisone treatment currently on 5 mg daily   PMH of 9 stress fractures in the past to various areas.     Medical history notable for " osteopenia (on Prolia (denosumab) and citracal+D, last dexa 2/28/19 T-score -2.1 lumbar vertebra, -1.1 left femoral neck) and RA (on Kevzara and prednisone 5 mg)    Review of Systems   Constitution: Negative. Negative for chills, fever and night sweats.   HENT: Negative for congestion and headaches.    Eyes: Negative for blurred vision, left vision loss and right vision loss.   Cardiovascular: Negative for chest pain and syncope.   Respiratory: Negative for cough and shortness of breath.    Endocrine: Negative for polydipsia, polyphagia and polyuria.   Hematologic/Lymphatic: Negative for bleeding problem. Does not bruise/bleed easily.   Skin: Negative for dry skin, itching and rash.   Musculoskeletal: Negative for falls and muscle weakness.   Gastrointestinal: Negative for abdominal pain and bowel incontinence.   Genitourinary: Negative for bladder incontinence and nocturia.   Neurological: Negative for disturbances in coordination, loss of balance and seizures.   Psychiatric/Behavioral: Negative for depression. The patient does not have insomnia.    Allergic/Immunologic: Negative for hives and persistent infections.   All other systems negative.    PAST MEDICAL HISTORY:   Past Medical History:   Diagnosis Date    Acid reflux     Allergy     Anemia     Asthma     Coronary artery disease     Degenerative disc disease     Dry eyes     Dry mouth     Gastroparesis     Hyperlipidemia     Lateral meniscus derangement 4/6/2016    Lobular carcinoma in situ     Osteoarthritis     Osteoporosis     Rheumatoid arthritis(714.0)     Umbilical hernia 8/13/2015     PAST SURGICAL HISTORY:   Past Surgical History:   Procedure Laterality Date    BREAST BIOPSY Left 01/29/2002    core bx    CARPAL TUNNEL RELEASE Right 05/2017    CHOLECYSTECTOMY  2004    COLONOSCOPY      10/11    COLONOSCOPY N/A 6/29/2017    Procedure: COLONOSCOPY;  Surgeon: López Moore MD;  Location: [facility] ([address] [place] [address]);  Service: Endoscopy;   Laterality: N/A;    INJECTION OF FACET JOINT Bilateral 3/12/2020    Procedure: FACET JOINT INJECTION (LUMBAR BLOCK) BILATERAL L4-5 AND L5-S1 DIRECT REFERRAL;  Surgeon: Tj Mayfield MD;  Location: [facility] PAIN MGT;  Service: Pain Management;  Laterality: Bilateral;  NEEDS CONSENT    INJECTION OF JOINT Right 7/30/2020    Procedure: INJECTION, JOINT, RIGHT HIP Interarticular under flouro;  Surgeon: Tj Mayfield MD;  Location: [facility] PAIN MGT;  Service: Pain Management;  Laterality: Right;  INJECTION, JOINT, RIGHT HIP Interarticular under flouro    INTRAMEDULLARY RODDING OF FEMUR Left 5/21/2019    Procedure: INSERTION, INTRAMEDULLARY ARIEL, FEMUR;  Surgeon: López Duff MD;  Location: [facility] OR 2ND FLR;  Service: Orthopedics;  Laterality: Left;    REPAIR OF TRICEPS TENDON Left 10/17/2018    Procedure: REPAIR, TENDON, TRICEPS left elbow;  Surgeon: Staci Yarbrough MD;  Location: [facility] OR 1ST FLR;  Service: Orthopedics;  Laterality: Left;  Anesthesia: General and regional. PRONE, k-wire , hand pan 1 and pan 2, CALL ARTHREX/Tamera notified 10-12 LO    TONSILLECTOMY      TRANSFORAMINAL EPIDURAL INJECTION OF STEROID Right 8/31/2020    Procedure: INJECTION, STEROID, EPIDURAL, TRANSFORAMINAL,  APPROACH, L3-L4 and L4-L5 need consent;  Surgeon: Tj Mayfield MD;  Location: [facility] PAIN MGT;  Service: Pain Management;  Laterality: Right;    TUBAL LIGATION  2003    UPPER GASTROINTESTINAL ENDOSCOPY      10/11    uterine ablation  2003     FAMILY HISTORY:   Family History   Problem Relation Age of Onset    Cancer Mother         Lung Cancer    Emphysema Mother     Heart attack Mother     Cancer Father         Lung Cancer    Osteoarthritis Father     Lung cancer Father     Skin cancer Father     Heart disease Brother     Heart attack Brother     Osteoarthritis Paternal Aunt     Retinal detachment Maternal Aunt     No Known Problems Sister     No Known Problems Maternal Uncle     No Known Problems Paternal  Uncle     No Known Problems Maternal Grandmother     No Known Problems Maternal Grandfather     No Known Problems Paternal Grandmother     No Known Problems Paternal Grandfather     Colon cancer Neg Hx     Esophageal cancer Neg Hx     Stomach cancer Neg Hx     Celiac disease Neg Hx     Diabetes Neg Hx     Thyroid disease Neg Hx     Amblyopia Neg Hx     Blindness Neg Hx     Cataracts Neg Hx     Glaucoma Neg Hx     Hypertension Neg Hx     Macular degeneration Neg Hx     Strabismus Neg Hx     Stroke Neg Hx      SOCIAL HISTORY:   Social History     Socioeconomic History    Marital status:      Spouse name: Not on file    Number of children: Not on file    Years of education: Not on file    Highest education level: Not on file   Occupational History    Not on file   Social Needs    Financial resource strain: Not hard at all    Food insecurity     Worry: Never true     Inability: Never true    Transportation needs     Medical: No     Non-medical: No   Tobacco Use    Smoking status: Never Smoker    Smokeless tobacco: Never Used   Substance and Sexual Activity    Alcohol use: Yes     Frequency: Monthly or less     Drinks per session: Patient refused     Binge frequency: Never     Comment: occasionally    Drug use: No    Sexual activity: Yes     Partners: Male     Birth control/protection: Surgical   Lifestyle    Physical activity     Days per week: 2 days     Minutes per session: 50 min    Stress: Not at all   Relationships    Social connections     Talks on phone: More than three times a week     Gets together: More than three times a week     Attends Orthodox service: Not on file     Active member of club or organization: Yes     Attends meetings of clubs or organizations: More than 4 times per year     Relationship status:    Other Topics Concern    Are you pregnant or think you may be? Not Asked    Breast-feeding Not Asked   Social History Narrative    Not on file        MEDICATIONS:   Current Outpatient Medications:     albuterol (ACCUNEB) 1.25 mg/3 mL Nebu, Take 3 mLs (1.25 mg total) by nebulization every 6 (six) hours as needed. Rescue, Disp: 1 Box, Rfl: 11    albuterol (PROVENTIL HFA) 90 mcg/actuation inhaler, Inhale 2 puffs into the lungs every 6 (six) hours as needed. Rescue, Disp: 20.1 g, Rfl: 11    albuterol-ipratropium (DUO-NEB) 2.5 mg-0.5 mg/3 mL nebulizer solution, Take 3 mLs by nebulization every 6 (six) hours as needed for Wheezing. Rescue, Disp: 1 Box, Rfl: 0    aspirin 81 mg Tab, Take 81 mg by mouth every morning. , Disp: , Rfl:     azelastine (ASTELIN) 137 mcg (0.1 %) nasal spray, 1 spray (137 mcg total) by Nasal route 2 (two) times daily., Disp: 30 mL, Rfl: 0    benzonatate (TESSALON) 100 MG capsule, Take 1 capsule by mouth three times a day, Disp: 9 capsule, Rfl: 0    budesonide-formoterol 160-4.5 mcg (SYMBICORT) 160-4.5 mcg/actuation HFAA, Inhale 2 puffs into the lungs every 12 (twelve) hours., Disp: 3 Inhaler, Rfl: 3    calcium citrate-vitamin D (CITRACAL + D) 315-200 mg-unit per tablet, Take 1 tablet by mouth 2 (two) times daily. , Disp: , Rfl:     ciclopirox (PENLAC) 8 % Soln, Apply daily to affected nail. Must remove and restart weekly, Disp: 1 Bottle, Rfl: 5    diclofenac sodium (VOLTAREN) 1 % Gel, APPLY 2 GRAMS TOPICALLY 4 (FOUR) TIMES DAILY AS NEEDED., Disp: , Rfl:     erythromycin (ROMYCIN) ophthalmic ointment, Apply to affected external eyelid tissue two times per day as directed for fourteen days, Disp: 3.5 g, Rfl: 2    fluconazole (DIFLUCAN) 100 MG tablet, , Disp: , Rfl:     fluticasone propionate (FLONASE) 50 mcg/actuation nasal spray, 2 sprays (100 mcg total) by Each Nostril route once daily., Disp: 9.9 mL, Rfl: 11    INV PROPULSID 10 MG, Take 1 tablets (20 mg) by mouth 4 (four) times daily. FOR INVESTIGATIONAL USE ONLY. Protocol CIS-USA-154, Disp: 4360 each, Rfl: 0    levoFLOXacin (LEVAQUIN) 500 MG tablet, Take 1 tablet (500 mg  total) by mouth once daily. for 14 days, Disp: 14 tablet, Rfl: 0    lifitegrast (XIIDRA) 5 % Dpet, Place 1 drop into both eyes 2 (two) times daily., Disp: 60 each, Rfl: 12    methenamine (HIPREX) 1 gram Tab, Take 1 tablet (1 g total) by mouth 2 (two) times daily., Disp: 60 tablet, Rfl: 11    methenamine (HIPREX) 1 gram Tab, Take 1 tablet (1 g total) by mouth 2 (two) times daily., Disp: 180 tablet, Rfl: 3    mirabegron (MYRBETRIQ) 25 mg Tb24 ER tablet, Take 1 tablet (25 mg total) by mouth once daily., Disp: 30 tablet, Rfl: 11    montelukast (SINGULAIR) 10 mg tablet, Take 1 tablet (10 mg total) by mouth nightly., Disp: 90 tablet, Rfl: 3    naproxen (NAPROSYN) 500 MG tablet, Take 1 tablet (500 mg total) by mouth 2 (two) times daily as needed., Disp: 180 tablet, Rfl: 0    omeprazole (PRILOSEC) 40 MG capsule, Take 1 capsule (40 mg total) by mouth every morning., Disp: 30 capsule, Rfl: 6    omeprazole-sodium bicarbonate (ZEGERID) 40-1.1 mg-gram per capsule, Take 1 capsule by mouth every morning., Disp: 90 capsule, Rfl: 3    POTASSIUM ORAL, Take by mouth once daily., Disp: , Rfl:     predniSONE (DELTASONE) 5 MG tablet, Take 1 tablet (5 mg total) by mouth once daily., Disp: 90 tablet, Rfl: 1    PREMARIN 0.625 mg tablet, Take 0.625 mg by mouth once daily., Disp: , Rfl: 4    PREMARIN vaginal cream, PLACE 0.5 GRAM VAGINALLY 3 (THREE) TIMES A WEEK., Disp: 30 g, Rfl: 1    progesterone (PROMETRIUM) 100 MG capsule, Take 100 mg by mouth every morning. 1 Capsule Oral Every day, morning, Disp: , Rfl:     RESTASIS 0.05 % ophthalmic emulsion, PLACE 0.4 MLS (1 DROP TOTAL) INTO BOTH EYES 2 (TWO) TIMES DAILY., Disp: 60 vial, Rfl: 5    rosuvastatin (CRESTOR) 20 MG tablet, Take 1 tablet (20 mg total) by mouth every evening., Disp: 90 tablet, Rfl: 3    sarilumab (KEVZARA) 200 mg/1.14 mL Syrg, Inject 1.14 mLs (200 mg total) into the skin every 14 (fourteen) days., Disp: 2.28 mL, Rfl: 2    sucralfate (CARAFATE) 1 gram tablet,  "Take 1 tablet (1 g total) by mouth 4 (four) times daily., Disp: 360 tablet, Rfl: 3    syringe with needle (TUBERCULIN SYRINGE) 1 mL 27 x 1/2" Syrg, To administer methotrexate 7.5mg sc once a week, Disp: 12 Syringe, Rfl: 3    teriparatide (FORTEO) 20 mcg/dose - 600 mcg/2.4 mL PnIj, Inject 0.08 mLs (20 mcg total) into the skin once daily., Disp: 2.4 mL, Rfl: 11    traMADol (ULTRAM) 50 mg tablet, Take 1 tablet (50 mg total) by mouth every 24 hours as needed for Pain., Disp: 7 tablet, Rfl: 0    triamcinolone acetonide 0.1% (KENALOG) 0.1 % ointment, aaa qhs , then vaseline and warm towel for flares. Not more than 2 weeks straight in same location. Avoid face and groin, Disp: 45 g, Rfl: 1    DULoxetine (CYMBALTA) 20 MG capsule, Take 1 capsule (20 mg total) by mouth once daily., Disp: 30 capsule, Rfl: 11    GUAIFENESIN (MUCINEX ORAL), Take 400 mg by mouth every 4 (four) hours as needed. , Disp: , Rfl:     leflunomide (ARAVA) 20 MG Tab, Take 1 tablet (20 mg total) by mouth once daily., Disp: 90 tablet, Rfl: 0    trospium (SANCTURA) 20 mg Tab tablet, TAKE 1 TABLET BY MOUTH TWICE A DAY. DUE FOR FOLLOW UP IN JULY, Disp: 60 tablet, Rfl: 0    Current Facility-Administered Medications:     betamethasone acetate-betamethasone sodium phosphate injection 6 mg, 6 mg, Intra-articular, Once, Tj Mayfield MD    Facility-Administered Medications Ordered in Other Visits:     0.9%  NaCl infusion, , Intravenous, Continuous, Callum Singer MD, Stopped at 05/21/19 1100  ALLERGIES:   Review of patient's allergies indicates:   Allergen Reactions    Actemra [tocilizumab] Other (See Comments)     Severe dizziness    Codeine Nausea And Vomiting    Gold au 198 Hives and Rash    Hydroxychloroquine Other (See Comments)     Can't remember the reaction      Iodinated contrast media Other (See Comments)     Other reaction(s): BURNING ALL OVER    Iodine Other (See Comments)     Other reaction(s): BURNING ALL OVER - Iodine dye - Not " "topical    Sulfa (sulfonamide antibiotics) Other (See Comments)     Can't remember the reaction    Zofran [ondansetron hcl (pf)] Nausea And Vomiting     Pt reports that last time she received zofran she started vomiting again    Methotrexate analogues Nausea Only    Pneumovax 23 [pneumococcal 23-argenis ps vaccine] Other (See Comments)     "sick"       VITAL SIGNS: /78   Pulse 101   Ht 5' 1" (1.549 m)   Wt 71.7 kg (158 lb)   LMP  (LMP Unknown)   BMI 29.85 kg/m²        PHYSICAL EXAM /  HIP  PHYSICAL EXAMINATION  General:  The patient is alert and oriented x 3.  Mood is pleasant.  Observation of ears, eyes and nose reveal no gross abnormalities.  HEENT: NCAT, sclera nonicteric  Lungs: Respirations are equal and unlabored..    Right HIP EXAMINATION     OBSERVATION / INSPECTION  Gait:   Nonantalgic   Alignment:  Neutral   Scars:   None   Muscle atrophy: None   Effusion:  None   Warmth:  None   Discoloration:   None   Leg lengths:   Equal   Pelvis:   Level     TENDERNESS / CREPITUS (T/C):      T / C  Trochanteric bursa    + / -  Piriformis    - / -  SI joint    - / -  Psoas tendon   - / -  Rectus insertion  - / -  Adductor origin  -/ -  Pubic symphysis  - / -  IT band                                   + / -  Gluteus tendons                     - / -    ROM: (* = pain)    Flexion:    120 degrees  External rotation: 35 degrees  Internal rotation with axial load: 20 degrees  Internal rotation without axial load: 30 degrees  Abduction:  35 degrees  Adduction:   20 degrees    SPECIAL TESTS:  Pain w/ forced internal rotation (FADIR): -   Pain w/ forced external rotation (SANDRA): -   Circumduction test:    -  Stinchfield test:    -  Log roll:      Negative   Snapping hip (internal):   Negative   Step-down test:    +  Trendelenburg test:    Negative  Bridge test     +     EXTREMITY NEURO-VASCULAR EXAMINATION:   Sensation:  Grossly intact to light touch all dermatomal regions.   Motor Function:  Fully intact motor " function at hip, knee, foot and ankle    DTRs;  quadriceps and  achilles 2+.  No clonus and downgoing Babinski.    Vascular status:  DP and PT pulses 2+, brisk capillary refill, symmetric.    Skin:  intact, compartments soft.    OTHER FINDINGS:    XRAYS:  2 hip/pelvis views were independently reviewed and interpreted by myself.  No fracture, subluxation. Mild right hip DJD noted.    ASSESSMENT:    1. Right hip pain, chronic  2.  Right hip trochanteric bursitis    PLAN:  1.  She is also dealing with left foot pain and RA pain.   2.  She is currently on Prolia for her osteopenia  3.  Continue current pain management    Continue PT and/or HEP to strengthen hips, core, glutes and work on IT band pain.     RTC prn in 2-3 months as needed.     All questions were answered, pt will contact us for questions or concerns in the interim.

## 2020-11-01 DIAGNOSIS — H04.123 BILATERAL DRY EYES: ICD-10-CM

## 2020-11-01 RX ORDER — CYCLOSPORINE 0.5 MG/ML
EMULSION OPHTHALMIC
Qty: 180 ML | Refills: 1 | Status: SHIPPED | OUTPATIENT
Start: 2020-11-01 | End: 2021-09-16 | Stop reason: SDUPTHER

## 2020-11-02 ENCOUNTER — CLINICAL SUPPORT (OUTPATIENT)
Dept: REHABILITATION | Facility: HOSPITAL | Age: 60
End: 2020-11-02
Payer: MEDICARE

## 2020-11-02 ENCOUNTER — INFUSION (OUTPATIENT)
Dept: INFECTIOUS DISEASES | Facility: HOSPITAL | Age: 60
End: 2020-11-02
Attending: INTERNAL MEDICINE
Payer: MEDICARE

## 2020-11-02 ENCOUNTER — CLINICAL SUPPORT (OUTPATIENT)
Dept: INFECTIOUS DISEASES | Facility: CLINIC | Age: 60
End: 2020-11-02
Payer: MEDICARE

## 2020-11-02 VITALS — HEART RATE: 92 BPM | DIASTOLIC BLOOD PRESSURE: 85 MMHG | SYSTOLIC BLOOD PRESSURE: 144 MMHG

## 2020-11-02 DIAGNOSIS — M81.8 STEROID-INDUCED OSTEOPOROSIS: Primary | ICD-10-CM

## 2020-11-02 DIAGNOSIS — Z74.09 IMPAIRED FUNCTIONAL MOBILITY, BALANCE, GAIT, AND ENDURANCE: ICD-10-CM

## 2020-11-02 DIAGNOSIS — T38.0X5A STEROID-INDUCED OSTEOPOROSIS: Primary | ICD-10-CM

## 2020-11-02 PROCEDURE — 99999 PR PBB SHADOW E&M-EST. PATIENT-LVL III: ICD-10-PCS | Mod: PBBFAC,,,

## 2020-11-02 PROCEDURE — 97110 THERAPEUTIC EXERCISES: CPT | Mod: CQ

## 2020-11-02 PROCEDURE — G0008 FLU VACCINE - QUADRIVALENT - ADJUVANTED: ICD-10-PCS | Mod: S$GLB,,, | Performed by: INTERNAL MEDICINE

## 2020-11-02 PROCEDURE — 97140 MANUAL THERAPY 1/> REGIONS: CPT | Mod: CQ

## 2020-11-02 PROCEDURE — 63600175 PHARM REV CODE 636 W HCPCS: Mod: JG | Performed by: INTERNAL MEDICINE

## 2020-11-02 PROCEDURE — G0008 ADMIN INFLUENZA VIRUS VAC: HCPCS | Mod: S$GLB,,, | Performed by: INTERNAL MEDICINE

## 2020-11-02 PROCEDURE — 90694 VACC AIIV4 NO PRSRV 0.5ML IM: CPT | Mod: S$GLB,,, | Performed by: INTERNAL MEDICINE

## 2020-11-02 PROCEDURE — 99999 PR PBB SHADOW E&M-EST. PATIENT-LVL III: CPT | Mod: PBBFAC,,,

## 2020-11-02 PROCEDURE — 90694 FLU VACCINE - QUADRIVALENT - ADJUVANTED: ICD-10-PCS | Mod: S$GLB,,, | Performed by: INTERNAL MEDICINE

## 2020-11-02 NOTE — PROGRESS NOTES
Patient has an active urinary tract injection and Dr Moran asked that we postpone injection x 2 weeks.  Returned med to [facility].

## 2020-11-02 NOTE — PROGRESS NOTES
"Physical Therapy (PT) Daily Treatment Note     Name: Joyce Peterson  Clinic Number: [id number]    Therapy Diagnosis:   Encounter Diagnosis   Name Primary?    Impaired functional mobility, balance, gait, and endurance      Physician: Jett Julien III, *    Visit Date: 2020    Physician Orders: PT Eval and TreatInstructed of HEP, formal PT session if wanted by patient and definitely dry needling of the right hip flexor, bursa, and glutes for likely external hip pain.    .   Medical Diagnosis from Referral: pain in right hip  Evaluation Date: 2020  Authorization Period Expiration:20   Plan of Care Expiration: 1/15/2021  Visit # / Visits authorized:       Time In: 1245  Time Out: 1330  Total Billable Time: 43 minutes    Precautions: history of multiple stress fractures    Subjective     Patient reports: Pt returns to therapy this pm with c/o R foot and L hip pain upon entry.     She was compliant with home exercise program.  Functional change: none yet    Pain: 6/10  Location: right hip      Objective     Joyce received therapeutic exercises to develop strength, endurance and core stabilization for 35 minutes includin-way ankle OTB x 30 each  Bridges OTB c arch lift 2 x 10 x 5"  S/L clams OTB 10 x 10" stefania  Short foot 10x5"  Towel scrunches 20x5"  Seated neutral talar positioning 20x5"  Standing neutral talar positioning 20x5"  INV towel slides 1.5# x 5  Pt education: updated HEP and gave printout    Joyce received neuromuscular re-education for 00 minutes to improve coordination, including:  TA set with march 3x10 each  TA set with heel slide 2x10 each  Standing minisquat with palloff press 2x10 each 7#    Joyce received manual therapy for 08 minutes including:   Assessment of hip/ankle ROM  Inferior R femoral glides, grade III    Home Exercise Program (HEP) Provided and Patient Education Provided     Patient was provided education on follow up with MD about foot pain and ongoing left " hip pain.     Written Home Exercises Provided: Patient instructed to cont prior home program.  Exercises were reviewed and Joyce was able to demonstrate them prior to the end of the session.  Joyce demonstrated good  understanding of the education provided.     Assessment     Pt was able to complete all exercises with no c/o increased hip/foot pain. INV remains weak. Pt required verbal and tactile cues to avoid compensatory supination during arch lifts and hip IR during resisted INV. Sig pesplanus bilaterally. Updated HEP and issued printout/therabands. No adverse effects reported p tx.     Joyce is progressing well towards her goals.     Patient will continue to benefit from skilled outpatient physical therapy to address the deficits listed in the problem list box on initial evaluation, provide patient/family education and to maximize patient's level of independence in the home and community environment.     Patient prognosis is Good.     Patient's spiritual, cultural and educational needs considered and pt agreeable to plan of care and goals.    Anticipated barriers to physical therapy: foot pain    Goals: Short Term Goals (3-4 weeks):  1. Patient will have decreased complaints of right hip pain to 3/10 at rest and 5/10 at worst at the end of the day.progressing not met   2. Patient will ambulate without a trendelenburg gait  using an assistive device (single point cane) 300 feet indoors. MET no longer uses AD 9/8/20  3. Patient will be independent and compliant with issued HEP.MET 8/11/20  4. Patient will have 75% decreased tenderness to palpation of the right greater trochanter.     Long Term Goals (4-6 weeks):   1. Patient will be pain free right hip at rest and no more than 3/10 at worst at end of the day  2. Patient will have improved strength of the right hip to 5/5 flexion/abduction/extension for increased ability to perform activities of daily living.  3. Patient will be able to resume prior functional  level with no deficits, including normal gait without assistive device.   4. Patient will be able to ascend/descend steps in clinic foot over foot without hand rails       Plan      Plan of care Certification: 7/31/2020 to 9/30/20.     Outpatient Physical Therapy 2 times weekly for 6 weeks to include the following interventions: Electrical Stimulation with dry needling, Gait Training, Manual Therapy, Neuromuscular Re-ed, Patient Education and Therapeutic Exercise       Jeannine Ospina, PTA

## 2020-11-03 ENCOUNTER — PATIENT MESSAGE (OUTPATIENT)
Dept: ENDOCRINOLOGY | Facility: CLINIC | Age: 60
End: 2020-11-03

## 2020-11-04 ENCOUNTER — CLINICAL SUPPORT (OUTPATIENT)
Dept: REHABILITATION | Facility: HOSPITAL | Age: 60
End: 2020-11-04
Payer: MEDICARE

## 2020-11-04 ENCOUNTER — OFFICE VISIT (OUTPATIENT)
Dept: ENDOCRINOLOGY | Facility: CLINIC | Age: 60
End: 2020-11-04
Payer: MEDICARE

## 2020-11-04 ENCOUNTER — PATIENT MESSAGE (OUTPATIENT)
Dept: ENDOCRINOLOGY | Facility: CLINIC | Age: 60
End: 2020-11-04

## 2020-11-04 DIAGNOSIS — T38.0X5A STEROID-INDUCED OSTEOPOROSIS: ICD-10-CM

## 2020-11-04 DIAGNOSIS — M81.8 STEROID-INDUCED OSTEOPOROSIS: ICD-10-CM

## 2020-11-04 DIAGNOSIS — E04.1 THYROID NODULE: Chronic | ICD-10-CM

## 2020-11-04 DIAGNOSIS — Z74.09 IMPAIRED FUNCTIONAL MOBILITY, BALANCE, GAIT, AND ENDURANCE: ICD-10-CM

## 2020-11-04 DIAGNOSIS — E05.90 SUBCLINICAL HYPERTHYROIDISM: Primary | ICD-10-CM

## 2020-11-04 PROCEDURE — 97110 THERAPEUTIC EXERCISES: CPT | Mod: CQ

## 2020-11-04 PROCEDURE — 97140 MANUAL THERAPY 1/> REGIONS: CPT | Mod: CQ

## 2020-11-04 PROCEDURE — 99214 PR OFFICE/OUTPT VISIT, EST, LEVL IV, 30-39 MIN: ICD-10-PCS | Mod: 95,,, | Performed by: INTERNAL MEDICINE

## 2020-11-04 PROCEDURE — 99214 OFFICE O/P EST MOD 30 MIN: CPT | Mod: 95,,, | Performed by: INTERNAL MEDICINE

## 2020-11-04 NOTE — ASSESSMENT & PLAN NOTE
Right nodule meeting criteria for FNA appears stable compared to prior imaging and with negative FNA in past  Plan for repeat in 2021

## 2020-11-04 NOTE — PROGRESS NOTES
Joyce Peterson is a 60 y.o. female with RA on chronic steroids presenting for evaluation of subclinical hyperthyroidism/thyroiditis, thyroid nodule, osteoporosis    The patient location is: [place], [place]  The chief complaint leading to consultation is: subclinical hyperthyroidism, osteoporosis    Visit type: audiovisual    Face to Face time with patient:15 min  20 minutes of total time spent on the encounter, which includes face to face time and non-face to face time preparing to see the patient (eg, review of tests), Obtaining and/or reviewing separately obtained history, Documenting clinical information in the electronic or other health record, Independently interpreting results (not separately reported) and communicating results to the patient/family/caregiver, or Care coordination (not separately reported).     Each patient to whom he or she provides medical services by telemedicine is:  (1) informed of the relationship between the physician and patient and the respective role of any other health care provider with respect to management of the patient; and (2) notified that he or she may decline to receive medical services by telemedicine and may withdraw from such care at any time.    History of Present Illness  Subclinical hyperthyroidism dating back to 2016  In the past she has had mild reduction of TSH although with values normal over the last year  She and Dr. Forbes discussed methimazole in 2016 but she did not want to add additional pill and thyroid function subsequently normalized    Labs in 2/2020 with mildly low TSH but she preferred observation of thyroid function again at that time. No repeat labs since then (TRAB and TSH ordered by not done)    Lab Results   Component Value Date    TSH 0.349 (L) 02/19/2020       Weight: stable  Energy level: normal  Cold/heat intolerance:none  Bowel movements: denies hyperdefecation  Tremor: none  Palpitations: none  Nervousness/mood changes: none    Exposure to  iodine/contrast: none    Thyroid nodules  Dominant R nodule FNA 8/6/12 Benign    Stable on imaging in 2/2020. Planning repeat in 2022    No hx of thyroid cancer in family  No hx of radiation exposure  Denies personal history of breast or colon cancer.     GIOP  Current medication: Forteo 2/2020-current. Mild nausea but overall no issues  Prolia 2017-current     Calcium intake?  Citracal BID  Vit D intake?  Citracal BID - Vit D in 2/2019 was 40  Weight bearing exercise?   no  Recent falls? no  Fall Precautions? no  Height loss (>2 inches)? no     Fracture history:  Has had at least 7 fractures in her feet (2nd-5th metatarsals). Two since being on Prolia.  Stress fracture of left femoral neck in 5/2019 - s/p CM nailing     Tobacco use ?  no  EtOH use?        Yes; occasional     Current diarrhea or h/o malabsorption? no  Chronic steroids? Yes - has been on chronic corticosteroids for the past 34 years for RA. On and off, but mostly on; current dose is prednisone 5 mg daily, but previously on higher doses  Anticoagulants? no  Proton Pump Inhibitors? yes  Antiseizure medications? no   Thyroid disease? see above  Anemia? no  Kidney Stones? no  Hyperparathyroidism? no     Post-menopausal? Age?: 50s  ERT after menopause?: Yes, remains on E+P     Mom or dad with hip fracture?: No, mom with hip fx     Malignancy involving bone (active or history)? no  Prior radiation treatment? no  Unexplained elevation of alkaline phosphatase? no  Early tooth loss as a child? no  Dental work planned? no        DXA 2/2019:  FINDINGS:  The L1 to L4 vertebral bone mineral density is equal to 1.051 g/cm squared with a T score of -1.2, Z-score -0.2 comparison with previous T-score -2.1 and Z-score -0.9.  There has been significant improvement change relative to the prior study.    The left femoral neck bone mineral density is equal to 0.889 g/cm squared with a T score of -1.1, Z-score 0.0, compared with previous T-score -1.1 and Z-score 0.0.   There has been  no significant change relative to the prior study.    FRAX calculation:   BMI: 30.4  The ten year probability of fracture: Major osteoporotic 24%  Hip Fracture 2.4%     Reviewed previous DXAs. Last one was done at [facility] so couldn't compare directly.        Current Outpatient Medications:     albuterol (ACCUNEB) 1.25 mg/3 mL Nebu, Take 3 mLs (1.25 mg total) by nebulization every 6 (six) hours as needed. Rescue, Disp: 1 Box, Rfl: 11    albuterol (PROVENTIL HFA) 90 mcg/actuation inhaler, Inhale 2 puffs into the lungs every 6 (six) hours as needed. Rescue, Disp: 20.1 g, Rfl: 11    albuterol-ipratropium (DUO-NEB) 2.5 mg-0.5 mg/3 mL nebulizer solution, Take 3 mLs by nebulization every 6 (six) hours as needed for Wheezing. Rescue, Disp: 1 Box, Rfl: 0    aspirin 81 mg Tab, Take 81 mg by mouth every morning. , Disp: , Rfl:     azelastine (ASTELIN) 137 mcg (0.1 %) nasal spray, 1 spray (137 mcg total) by Nasal route 2 (two) times daily., Disp: 30 mL, Rfl: 0    benzonatate (TESSALON) 100 MG capsule, Take 1 capsule by mouth three times a day, Disp: 9 capsule, Rfl: 0    budesonide-formoterol 160-4.5 mcg (SYMBICORT) 160-4.5 mcg/actuation HFAA, Inhale 2 puffs into the lungs every 12 (twelve) hours., Disp: 3 Inhaler, Rfl: 3    calcium citrate-vitamin D (CITRACAL + D) 315-200 mg-unit per tablet, Take 1 tablet by mouth 2 (two) times daily. , Disp: , Rfl:     ciclopirox (PENLAC) 8 % Soln, Apply daily to affected nail. Must remove and restart weekly, Disp: 1 Bottle, Rfl: 5    diclofenac sodium (VOLTAREN) 1 % Gel, APPLY 2 GRAMS TOPICALLY 4 (FOUR) TIMES DAILY AS NEEDED., Disp: , Rfl:     DULoxetine (CYMBALTA) 20 MG capsule, Take 1 capsule (20 mg total) by mouth once daily., Disp: 30 capsule, Rfl: 11    erythromycin (ROMYCIN) ophthalmic ointment, Apply to affected external eyelid tissue two times per day as directed for fourteen days, Disp: 3.5 g, Rfl: 2    fluconazole (DIFLUCAN) 100 MG tablet, , Disp: , Rfl:      fluticasone propionate (FLONASE) 50 mcg/actuation nasal spray, 2 sprays (100 mcg total) by Each Nostril route once daily., Disp: 9.9 mL, Rfl: 11    INV PROPULSID 10 MG, Take 1 tablets (20 mg) by mouth 4 (four) times daily. FOR INVESTIGATIONAL USE ONLY. Protocol CIS-USA-154, Disp: 1440 each, Rfl: 0    leflunomide (ARAVA) 20 MG Tab, Take 1 tablet (20 mg total) by mouth once daily., Disp: 90 tablet, Rfl: 0    levoFLOXacin (LEVAQUIN) 500 MG tablet, Take 1 tablet (500 mg total) by mouth once daily. for 14 days, Disp: 14 tablet, Rfl: 0    lifitegrast (XIIDRA) 5 % Dpet, Place 1 drop into both eyes 2 (two) times daily., Disp: 60 each, Rfl: 12    methenamine (HIPREX) 1 gram Tab, Take 1 tablet (1 g total) by mouth 2 (two) times daily., Disp: 60 tablet, Rfl: 11    methenamine (HIPREX) 1 gram Tab, Take 1 tablet (1 g total) by mouth 2 (two) times daily., Disp: 180 tablet, Rfl: 3    mirabegron (MYRBETRIQ) 25 mg Tb24 ER tablet, Take 1 tablet (25 mg total) by mouth once daily., Disp: 30 tablet, Rfl: 11    montelukast (SINGULAIR) 10 mg tablet, Take 1 tablet (10 mg total) by mouth nightly., Disp: 90 tablet, Rfl: 3    naproxen (NAPROSYN) 500 MG tablet, Take 1 tablet (500 mg total) by mouth 2 (two) times daily as needed., Disp: 180 tablet, Rfl: 0    omeprazole (PRILOSEC) 40 MG capsule, Take 1 capsule (40 mg total) by mouth every morning., Disp: 30 capsule, Rfl: 6    omeprazole-sodium bicarbonate (ZEGERID) 40-1.1 mg-gram per capsule, Take 1 capsule by mouth every morning., Disp: 90 capsule, Rfl: 3    POTASSIUM ORAL, Take by mouth once daily., Disp: , Rfl:     predniSONE (DELTASONE) 5 MG tablet, Take 1 tablet (5 mg total) by mouth once daily., Disp: 90 tablet, Rfl: 1    PREMARIN 0.625 mg tablet, Take 0.625 mg by mouth once daily., Disp: , Rfl: 4    PREMARIN vaginal cream, PLACE 0.5 GRAM VAGINALLY 3 (THREE) TIMES A WEEK., Disp: 30 g, Rfl: 1    progesterone (PROMETRIUM) 100 MG capsule, Take 100 mg by mouth every  "morning. 1 Capsule Oral Every day, morning, Disp: , Rfl:     RESTASIS 0.05 % ophthalmic emulsion, PLACE 0.4 MLS (1 DROP TOTAL) INTO BOTH EYES 2 (TWO) TIMES DAILY., Disp: 180 mL, Rfl: 1    rosuvastatin (CRESTOR) 20 MG tablet, Take 1 tablet (20 mg total) by mouth every evening., Disp: 90 tablet, Rfl: 3    sarilumab (KEVZARA) 200 mg/1.14 mL Syrg, Inject 1.14 mLs (200 mg total) into the skin every 14 (fourteen) days., Disp: 2.28 mL, Rfl: 2    sucralfate (CARAFATE) 1 gram tablet, Take 1 tablet (1 g total) by mouth 4 (four) times daily., Disp: 360 tablet, Rfl: 3    syringe with needle (TUBERCULIN SYRINGE) 1 mL 27 x 1/2" Syrg, To administer methotrexate 7.5mg sc once a week, Disp: 12 Syringe, Rfl: 3    teriparatide (FORTEO) 20 mcg/dose - 600 mcg/2.4 mL PnIj, Inject 0.08 mLs (20 mcg total) into the skin once daily., Disp: 2.4 mL, Rfl: 11    traMADol (ULTRAM) 50 mg tablet, Take 1 tablet (50 mg total) by mouth every 24 hours as needed for Pain., Disp: 7 tablet, Rfl: 0    triamcinolone acetonide 0.1% (KENALOG) 0.1 % ointment, aaa qhs , then vaseline and warm towel for flares. Not more than 2 weeks straight in same location. Avoid face and groin, Disp: 45 g, Rfl: 1    trospium (SANCTURA) 20 mg Tab tablet, TAKE 1 TABLET BY MOUTH TWICE A DAY. DUE FOR FOLLOW UP IN JULY, Disp: 60 tablet, Rfl: 0    GUAIFENESIN (MUCINEX ORAL), Take 400 mg by mouth every 4 (four) hours as needed. , Disp: , Rfl:     Current Facility-Administered Medications:     betamethasone acetate-betamethasone sodium phosphate injection 6 mg, 6 mg, Intra-articular, Once, Tj Mayfield MD    Facility-Administered Medications Ordered in Other Visits:     0.9%  NaCl infusion, , Intravenous, Continuous, Callum Singer MD, Stopped at 05/21/19 1100    Review of Systems   Constitutional: Negative for activity change and unexpected weight change.   Respiratory: Negative for shortness of breath.    Cardiovascular: Negative for chest pain and leg swelling. "   Gastrointestinal: Negative for abdominal pain.   Genitourinary: Negative for dysuria.   Skin: Negative for rash.   Neurological: Negative for headaches.   Psychiatric/Behavioral: Negative for confusion.       Objective:     There were no vitals filed for this visit.  Wt Readings from Last 3 Encounters:   10/28/20 72.6 kg (160 lb)   10/26/20 71.7 kg (158 lb)   10/23/20 72.1 kg (159 lb)     There is no height or weight on file to calculate BMI.    LABS    Chemistry        Component Value Date/Time     10/26/2020 1035    K 3.7 10/26/2020 1035     10/26/2020 1035    CO2 28 10/26/2020 1035    BUN 18 (H) 10/26/2020 1035    CREATININE 0.74 10/26/2020 1035     10/26/2020 1035        Component Value Date/Time    CALCIUM 9.1 10/26/2020 1035    ALKPHOS 73 10/26/2020 1035    AST 41 10/26/2020 1035    ALT 30 10/26/2020 1035    BILITOT 0.7 10/26/2020 1035    ESTGFRAFRICA >60.0 10/26/2020 1035    EGFRNONAA >60.0 10/26/2020 1035        Vit D, 25-Hydroxy   Date Value Ref Range Status   11/08/2019 37 30 - 96 ng/mL Final     Comment:     Vitamin D deficiency.........<10 ng/mL                              Vitamin D insufficiency......10-29 ng/mL       Vitamin D sufficiency........> or equal to 30 ng/mL  Vitamin D toxicity............>100 ng/mL         Thyroid US 2/2020:Thyroid is normal in size.  Right lobe: 4.2 x 1.4 x 1.4 cm.  Left lobe: 4.7 x 1.6 x 1.6 cm.  Heterogeneous thyroid parenchyma.  Diffusely increased thyroid vascularity.  Right nodules: 1.2 cm heterogeneous nodule at the lower pole with slight hypoechogenicity, 0.7 predominantly cystic nodule at the lower pole.  Left nodules: 1.0 cm mixed solid and cystic nodule at the interpolar region (previously 0.9 cm), 0.7 cm heterogeneous hypointense nodule at the superior pole, 0.3 cm predominantly cystic nodule at the inferior pole.  Isthmus: 1.2 cm predominantly cystic nodule (previously 1.2 cm).    No abnormal morphology cervical lymph nodes.    Assessment  and Plan     Subclinical hyperthyroidism  Intermittent subclinical disease for several years   Previous imaging consistent with Graves' disease   Will check TSH and TRAB now  Methimazole has been discussed in past but she was hesitant to start medication but understands indication for treatment given osteoporosis  Pending lab results will plan to start low dose methimazole. Side effects including skin reaction, arthralgias, liver dysfunction and agranulocytosis reviewed.      Thyroid nodule  Right nodule meeting criteria for FNA appears stable compared to prior imaging and with negative FNA in past  Plan for repeat in 2021    Steroid-induced osteoporosis  Continue treatment with Forteo and Prolia  Plan for 18-24 months of treatment with Forteo and then will continue prolia  Check vit D with next labs  Reassuring no fractures since last visit  Calcium normal        RTC 6 months    Lani Huerta MD

## 2020-11-04 NOTE — PROGRESS NOTES
"Physical Therapy (PT) Daily Treatment Note     Name: Joyce Peterson  Clinic Number: [id number]    Therapy Diagnosis:   Encounter Diagnosis   Name Primary?    Impaired functional mobility, balance, gait, and endurance      Physician: Jett Julien III, *    Visit Date: 11/4/2020    Physician Orders: PT Eval and TreatInstructed of HEP, formal PT session if wanted by patient and definitely dry needling of the right hip flexor, bursa, and glutes for likely external hip pain.    .   Medical Diagnosis from Referral: pain in right hip  Evaluation Date: 7/31/2020  Authorization Period Expiration:11/4/20   Plan of Care Expiration: 1/15/2021  Visit # / Visits authorized: 12/20      Time In: 1045  Time Out: 1130  Total Billable Time: 43 minutes    Precautions: history of multiple stress fractures    Subjective     Patient reports: Pt returns to therapy this pm with c/o increased L foot and R hip pain upon entry. She states she was cleaning her house to prepare for company this weekend, and her lateral foot was hurting so badly that she had to take sitting breaks. No c/o back pain since previous tx. Reports compliance with HEP.     She was compliant with home exercise program.  Functional change: none yet    Pain: 6/10  Location: right hip      Objective     Joyce received therapeutic exercises to develop strength, endurance and core stabilization for 35 minutes including:  INV OTB x 30  Bridges OTB c arch lift 20 x 5"  S/L clams OTB 10 x 10" stefania  S/L hip ABD x 20 stefania   Short foot 10x5"  Towel scrunches 5" hold x 3'  Seated neutral talar positioning -> standing -> mini squats 20x5" each    Joyce received neuromuscular re-education for 00 minutes to improve coordination, including:    TA set with march 3x10 each  TA set with heel slide 2x10 each  Standing minisquat with palloff press 2x10 each 7#    Joyce received manual therapy for 08 minutes including:   Assessment of hip/ankle ROM  Inferior R femoral glides, grade " III    Home Exercise Program (HEP) Provided and Patient Education Provided     Patient was provided education on follow up with MD about foot pain and ongoing left hip pain.     Written Home Exercises Provided: Patient instructed to cont prior home program.  Exercises were reviewed and Joyce was able to demonstrate them prior to the end of the session.  Joyce demonstrated good  understanding of the education provided.     Assessment     Pt was able to complete all exercises with no c/o increased hip/foot pain. INV remains weak. Pt required verbal and tactile cues to avoid compensatory great toe flexion during arch lifts. Sig pesplanus bilaterally (L > R). Negative SLR neural tension test with INV/DF bias. She reports a relief in lateral foot pain when she inverts. No adverse effects reported p tx.     Joyce is progressing well towards her goals.     Patient will continue to benefit from skilled outpatient physical therapy to address the deficits listed in the problem list box on initial evaluation, provide patient/family education and to maximize patient's level of independence in the home and community environment.     Patient prognosis is Good.     Patient's spiritual, cultural and educational needs considered and pt agreeable to plan of care and goals.    Anticipated barriers to physical therapy: foot pain    Goals: Short Term Goals (3-4 weeks):  1. Patient will have decreased complaints of right hip pain to 3/10 at rest and 5/10 at worst at the end of the day.progressing not met   2. Patient will ambulate without a trendelenburg gait  using an assistive device (single point cane) 300 feet indoors. MET no longer uses AD 9/8/20  3. Patient will be independent and compliant with issued HEP.MET 8/11/20  4. Patient will have 75% decreased tenderness to palpation of the right greater trochanter.     Long Term Goals (4-6 weeks):   1. Patient will be pain free right hip at rest and no more than 3/10 at worst at end  of the day  2. Patient will have improved strength of the right hip to 5/5 flexion/abduction/extension for increased ability to perform activities of daily living.  3. Patient will be able to resume prior functional level with no deficits, including normal gait without assistive device.   4. Patient will be able to ascend/descend steps in clinic foot over foot without hand rails       Plan    Plan of care Certification: 7/31/2020 to 9/30/20.     Outpatient Physical Therapy 2 times weekly for 6 weeks to include the following interventions: Electrical Stimulation with dry needling, Gait Training, Manual Therapy, Neuromuscular Re-ed, Patient Education and Therapeutic Exercise       Jeannine Ospina, PTA

## 2020-11-04 NOTE — ASSESSMENT & PLAN NOTE
Intermittent subclinical disease for several years   Previous imaging consistent with Graves' disease   Will check TSH and TRAB now  Methimazole has been discussed in past but she was hesitant to start medication but understands indication for treatment given osteoporosis  Pending lab results will plan to start low dose methimazole. Side effects including skin reaction, arthralgias, liver dysfunction and agranulocytosis reviewed.

## 2020-11-04 NOTE — ASSESSMENT & PLAN NOTE
Continue treatment with Forteo and Prolia  Plan for 18-24 months of treatment with Forteo and then will continue prolia  Check vit D with next labs  Reassuring no fractures since last visit  Calcium normal

## 2020-11-10 ENCOUNTER — CLINICAL SUPPORT (OUTPATIENT)
Dept: REHABILITATION | Facility: HOSPITAL | Age: 60
End: 2020-11-10
Payer: MEDICARE

## 2020-11-10 DIAGNOSIS — Z74.09 IMPAIRED FUNCTIONAL MOBILITY, BALANCE, GAIT, AND ENDURANCE: ICD-10-CM

## 2020-11-10 PROCEDURE — 97110 THERAPEUTIC EXERCISES: CPT

## 2020-11-10 PROCEDURE — 97112 NEUROMUSCULAR REEDUCATION: CPT

## 2020-11-10 PROCEDURE — 97140 MANUAL THERAPY 1/> REGIONS: CPT

## 2020-11-10 NOTE — PROGRESS NOTES
"Physical Therapy (PT) Daily Treatment Note     Name: Joyce Peterson  Clinic Number: [id number]    Therapy Diagnosis:   Encounter Diagnosis   Name Primary?    Impaired functional mobility, balance, gait, and endurance      Physician: Jett Julien III, *    Visit Date: 11/10/2020    Physician Orders: PT Eval and TreatInstructed of HEP, formal PT session if wanted by patient and definitely dry needling of the right hip flexor, bursa, and glutes for likely external hip pain.    .   Medical Diagnosis from Referral: pain in right hip  Evaluation Date: 7/31/2020  Authorization Period Expiration:11/4/20   Plan of Care Expiration: 1/15/2021  Visit # / Visits authorized: 1/20      Time In: 1400  Time Out: 1445  Total Billable Time: 45 minutes    Precautions: history of multiple stress fractures    Subjective     Patient reports: She has had increased pain over the weekend since she went to a wedding and danced a lot. She had swelling and increased pain Sunday after. She is getting bilateral posterior thigh pain.     She was compliant with home exercise program.  Functional change: none yet    Pain: 6/10  Location: right hip      Objective     Joyce received therapeutic exercises to develop strength, endurance and core stabilization for 20 minutes including:  Supine sciatic nerve glide x15 each  Short foot 10x5" seated, standing  Towel scrunches 5" hold x 3'  Seated neutral talar positioning -> standing -> mini squats 20x5" each    Joyce received neuromuscular re-education for 15 minutes to improve coordination, including:  TA set with march 2x10 each  TA set with heel slide 2x10 each  Hip hinge 2x10 with dowel  Hip hinge with lat pulldown gold sportband  Minisquat with palloff press x15 each blue theraband    Joyce received manual therapy for 10 minutes including:   Assessment of hip/ankle ROM  Inferior R femoral glides, grade III    Home Exercise Program (HEP) Provided and Patient Education Provided     Patient was " provided education on follow up with MD about foot pain and ongoing left hip pain.     Written Home Exercises Provided: Patient instructed to cont prior home program.  Exercises were reviewed and Joyce was able to demonstrate them prior to the end of the session.  Joyce demonstrated good  understanding of the education provided.     Assessment     Reported decrease in sx following nerve glide. Improved hip flexion range following inferior femoral glide. Continued to progress core and foot functional stability to decrease symptom provocation with ADLs. Good hip hinge form noted following instruction, but back extensor fatigue with exercises. Will continue to promote lumbar stiffness to decrease radicular symptoms.     Joyce is progressing well towards her goals.     Patient will continue to benefit from skilled outpatient physical therapy to address the deficits listed in the problem list box on initial evaluation, provide patient/family education and to maximize patient's level of independence in the home and community environment.     Patient prognosis is Good.     Patient's spiritual, cultural and educational needs considered and pt agreeable to plan of care and goals.    Anticipated barriers to physical therapy: foot pain    Goals: Short Term Goals (3-4 weeks):  1. Patient will have decreased complaints of right hip pain to 3/10 at rest and 5/10 at worst at the end of the day.progressing not met   2. Patient will ambulate without a trendelenburg gait  using an assistive device (single point cane) 300 feet indoors. MET no longer uses AD 9/8/20  3. Patient will be independent and compliant with issued HEP.MET 8/11/20  4. Patient will have 75% decreased tenderness to palpation of the right greater trochanter.     Long Term Goals (4-6 weeks):   1. Patient will be pain free right hip at rest and no more than 3/10 at worst at end of the day  2. Patient will have improved strength of the right hip to 5/5  flexion/abduction/extension for increased ability to perform activities of daily living.  3. Patient will be able to resume prior functional level with no deficits, including normal gait without assistive device.   4. Patient will be able to ascend/descend steps in clinic foot over foot without hand rails       Plan    Plan of care Certification: 7/31/2020 to 9/30/20.     Outpatient Physical Therapy 2 times weekly for 6 weeks to include the following interventions: Electrical Stimulation with dry needling, Gait Training, Manual Therapy, Neuromuscular Re-ed, Patient Education and Therapeutic Exercise       Love Lay, PT

## 2020-11-12 ENCOUNTER — SPECIALTY PHARMACY (OUTPATIENT)
Dept: PHARMACY | Facility: CLINIC | Age: 60
End: 2020-11-12

## 2020-11-12 ENCOUNTER — CLINICAL SUPPORT (OUTPATIENT)
Dept: REHABILITATION | Facility: HOSPITAL | Age: 60
End: 2020-11-12
Payer: MEDICARE

## 2020-11-12 ENCOUNTER — PATIENT MESSAGE (OUTPATIENT)
Dept: ENDOCRINOLOGY | Facility: CLINIC | Age: 60
End: 2020-11-12

## 2020-11-12 DIAGNOSIS — Z74.09 IMPAIRED FUNCTIONAL MOBILITY, BALANCE, GAIT, AND ENDURANCE: ICD-10-CM

## 2020-11-12 PROCEDURE — 97112 NEUROMUSCULAR REEDUCATION: CPT

## 2020-11-12 PROCEDURE — 97110 THERAPEUTIC EXERCISES: CPT

## 2020-11-12 PROCEDURE — 97140 MANUAL THERAPY 1/> REGIONS: CPT

## 2020-11-12 NOTE — PROGRESS NOTES
Subjective:       Patient ID: Joyce Peterson is a 60 y.o. female.    Chief Complaint: Annual Exam, Diarrhea, and Nausea (stomach spasm 3day)    Patient is a 60 y.o.female who presents today for nausea, diarrhea since Tuesday. She was supposed to do her annual today but these symptoms started on Tuesday. She denies fever but did have chills one night. She notes liquid diarrhea, mild nausea and abdominal spasms in the epigastric region which started today. No recent exposure to covid 19. She isolates for the most part. She has been drinking plenty of fluid. Eating bland diet.   Review of Systems   Constitutional: Negative for appetite change, chills, diaphoresis and fever.   HENT: Negative for congestion, ear discharge, ear pain, postnasal drip, tinnitus, trouble swallowing and voice change.    Eyes: Negative for discharge, redness and itching.   Respiratory: Negative for cough, chest tightness, shortness of breath and wheezing.    Cardiovascular: Negative for chest pain, palpitations and leg swelling.   Gastrointestinal: Positive for diarrhea and nausea. Negative for abdominal pain, constipation and vomiting.   Endocrine: Negative for cold intolerance and heat intolerance.   Genitourinary: Negative for difficulty urinating, flank pain, hematuria and urgency.   Musculoskeletal: Negative for arthralgias, gait problem, myalgias and neck stiffness.   Skin: Negative for color change and rash.   Neurological: Negative for dizziness, seizures, syncope and headaches.   Hematological: Negative for adenopathy.   Psychiatric/Behavioral: Negative for agitation, behavioral problems, confusion and sleep disturbance.       Objective:      Physical Exam  Vitals signs and nursing note reviewed.   Constitutional:       General: She is not in acute distress.     Appearance: She is well-developed. She is not diaphoretic.   HENT:      Head: Normocephalic and atraumatic.      Right Ear: External ear normal.      Left Ear: External ear  normal.      Nose: Nose normal.      Mouth/Throat:      Pharynx: No oropharyngeal exudate.   Eyes:      General: No scleral icterus.        Right eye: No discharge.         Left eye: No discharge.      Conjunctiva/sclera: Conjunctivae normal.      Pupils: Pupils are equal, round, and reactive to light.   Neck:      Musculoskeletal: Neck supple.      Thyroid: No thyromegaly.      Vascular: No JVD.      Trachea: No tracheal deviation.   Cardiovascular:      Rate and Rhythm: Normal rate.      Heart sounds: Normal heart sounds. No murmur. No friction rub. No gallop.    Pulmonary:      Effort: Pulmonary effort is normal. No respiratory distress.      Breath sounds: Normal breath sounds. No stridor. No wheezing or rales.   Chest:      Chest wall: No tenderness.   Abdominal:      General: Bowel sounds are normal. There is no distension.      Palpations: Abdomen is soft.      Tenderness: There is abdominal tenderness in the epigastric area. There is no rebound.   Musculoskeletal:         General: No tenderness.   Lymphadenopathy:      Cervical: No cervical adenopathy.   Skin:     General: Skin is warm and dry.      Findings: No erythema or rash.   Neurological:      Mental Status: She is alert and oriented to person, place, and time.   Psychiatric:         Behavior: Behavior normal.         Assessment and Plan:       1. Diarrhea, unspecified type      2. Chills    3. Nausea    - COVID-19 Routine Screening      1. Rule out covid 19  2. However, advised pt that if symptoms worsen prior to result returning, go to the ER for immediate evaluation  3. If covid 19 screen is negative, we will further work up her symptoms with KUB, labs and CT abdomen and stool studies. Pt is immunocomprised therefore I advised her to monitor her symptoms closely and be seen in the ER right away if she develops fever, vomiting, worsening abdominal pain, etc. Pt agrees to plan. Will let her know once her result is back.     Annual will be  rescheduled    Notify clinic if symptoms change, worsen or do not improve        No follow-ups on file.

## 2020-11-12 NOTE — PROGRESS NOTES
"Physical Therapy (PT) Daily Treatment Note     Name: Joyce Peterson  Clinic Number: [id number]    Therapy Diagnosis:   Encounter Diagnosis   Name Primary?    Impaired functional mobility, balance, gait, and endurance      Physician: Jett Julien III, *    Visit Date: 11/12/2020    Physician Orders: PT Eval and TreatInstructed of HEP, formal PT session if wanted by patient and definitely dry needling of the right hip flexor, bursa, and glutes for likely external hip pain.    .   Medical Diagnosis from Referral: pain in right hip  Evaluation Date: 7/31/2020  Authorization Period Expiration:11/4/20   Plan of Care Expiration: 1/15/2021  Visit # / Visits authorized: 1/20      Time In: 1415  Time Out: 1500  Total Billable Time: 45 minutes    Precautions: history of multiple stress fractures    Subjective     Patient reports: She had increased low back pain following last session, and is having pain down both legs.     She was compliant with home exercise program.  Functional change: none yet    Pain: 6/10  Location: right hip      Objective     Joyce received therapeutic exercises to develop strength, endurance and core stabilization for 20 minutes including:  Supine sciatic nerve glide x15 each  DL bridge with short foot 20x5"  Short foot 10x5" seated, standing  Towel scrunches 10" hold x 3'  Towel windshield wipers 20x5"  Seated calf raise 2x10    Joyce received neuromuscular re-education for 15 minutes to improve coordination, including:  TA set with march 2x10 each  TA set with heel slide 2x10 each  90/90 heel taps x15  Prone glute set 20x5" each    Joyce received manual therapy for 10 minutes including:   Sacral counternutation mobilizations, grade I-III  Assessment of hip/ankle ROM  Inferior R femoral glides, grade III    Home Exercise Program (HEP) Provided and Patient Education Provided     Patient was provided education on follow up with MD about foot pain and ongoing left hip pain.     Written Home " Exercises Provided: Patient instructed to cont prior home program.  Exercises were reviewed and Joyce was able to demonstrate them prior to the end of the session.  Joyce demonstrated good  understanding of the education provided.     Assessment     Significant tenderness to lumbar spine and sacrum, improved following sacral counternutation mobilizations. Decreased intensity of exercises due to soreness, with inability to perform standing exercises without discomfort.     Joyce is progressing well towards her goals.     Patient will continue to benefit from skilled outpatient physical therapy to address the deficits listed in the problem list box on initial evaluation, provide patient/family education and to maximize patient's level of independence in the home and community environment.     Patient prognosis is Good.     Patient's spiritual, cultural and educational needs considered and pt agreeable to plan of care and goals.    Anticipated barriers to physical therapy: foot pain    Goals: Short Term Goals (3-4 weeks):  1. Patient will have decreased complaints of right hip pain to 3/10 at rest and 5/10 at worst at the end of the day.progressing not met   2. Patient will ambulate without a trendelenburg gait  using an assistive device (single point cane) 300 feet indoors. MET no longer uses AD 9/8/20  3. Patient will be independent and compliant with issued HEP.MET 8/11/20  4. Patient will have 75% decreased tenderness to palpation of the right greater trochanter.     Long Term Goals (4-6 weeks):   1. Patient will be pain free right hip at rest and no more than 3/10 at worst at end of the day  2. Patient will have improved strength of the right hip to 5/5 flexion/abduction/extension for increased ability to perform activities of daily living.  3. Patient will be able to resume prior functional level with no deficits, including normal gait without assistive device.   4. Patient will be able to ascend/descend steps  in clinic foot over foot without hand rails       Plan    Plan of care Certification: 7/31/2020 to 9/30/20.     Outpatient Physical Therapy 2 times weekly for 6 weeks to include the following interventions: Electrical Stimulation with dry needling, Gait Training, Manual Therapy, Neuromuscular Re-ed, Patient Education and Therapeutic Exercise       Love Lay, PT

## 2020-11-12 NOTE — TELEPHONE ENCOUNTER
11/13 B   Pt call in to order forteo and kevzara, both are [facility] next fill date is 11/13 for forteo and 11/16 for dani. [facility] will follow up on 11/16 to schedule.               Current Outpatient Medications   Medication Sig    albuterol (ACCUNEB) 1.25 mg/3 mL Nebu Take 3 mLs (1.25 mg total) by nebulization every 6 (six) hours as needed. Rescue    albuterol (PROVENTIL HFA) 90 mcg/actuation inhaler Inhale 2 puffs into the lungs every 6 (six) hours as needed. Rescue    albuterol-ipratropium (DUO-NEB) 2.5 mg-0.5 mg/3 mL nebulizer solution Take 3 mLs by nebulization every 6 (six) hours as needed for Wheezing. Rescue    aspirin 81 mg Tab Take 81 mg by mouth every morning.     azelastine (ASTELIN) 137 mcg (0.1 %) nasal spray 1 spray (137 mcg total) by Nasal route 2 (two) times daily.    benzonatate (TESSALON) 100 MG capsule Take 1 capsule by mouth three times a day    budesonide-formoterol 160-4.5 mcg (SYMBICORT) 160-4.5 mcg/actuation HFAA Inhale 2 puffs into the lungs every 12 (twelve) hours.    calcium citrate-vitamin D (CITRACAL + D) 315-200 mg-unit per tablet Take 1 tablet by mouth 2 (two) times daily.     ciclopirox (PENLAC) 8 % Soln Apply daily to affected nail. Must remove and restart weekly    diclofenac sodium (VOLTAREN) 1 % Gel APPLY 2 GRAMS TOPICALLY 4 (FOUR) TIMES DAILY AS NEEDED.    DULoxetine (CYMBALTA) 20 MG capsule Take 1 capsule (20 mg total) by mouth once daily.    erythromycin (ROMYCIN) ophthalmic ointment Apply to affected external eyelid tissue two times per day as directed for fourteen days    fluconazole (DIFLUCAN) 100 MG tablet     fluticasone propionate (FLONASE) 50 mcg/actuation nasal spray 2 sprays (100 mcg total) by Each Nostril route once daily.    GUAIFENESIN (MUCINEX ORAL) Take 400 mg by mouth every 4 (four) hours as needed.     INV PROPULSID 10 MG Take 1 tablets (20 mg) by mouth 4 (four) times daily. FOR INVESTIGATIONAL USE ONLY. Protocol [id number]    leflunomide (ARAVA)  "20 MG Tab Take 1 tablet (20 mg total) by mouth once daily.    lifitegrast (XIIDRA) 5 % Dpet Place 1 drop into both eyes 2 (two) times daily.    methenamine (HIPREX) 1 gram Tab Take 1 tablet (1 g total) by mouth 2 (two) times daily.    methenamine (HIPREX) 1 gram Tab Take 1 tablet (1 g total) by mouth 2 (two) times daily.    mirabegron (MYRBETRIQ) 25 mg Tb24 ER tablet Take 1 tablet (25 mg total) by mouth once daily.    montelukast (SINGULAIR) 10 mg tablet Take 1 tablet (10 mg total) by mouth nightly.    naproxen (NAPROSYN) 500 MG tablet Take 1 tablet (500 mg total) by mouth 2 (two) times daily as needed.    omeprazole (PRILOSEC) 40 MG capsule Take 1 capsule (40 mg total) by mouth every morning.    omeprazole-sodium bicarbonate (ZEGERID) 40-1.1 mg-gram per capsule Take 1 capsule by mouth every morning.    POTASSIUM ORAL Take by mouth once daily.    predniSONE (DELTASONE) 5 MG tablet Take 1 tablet (5 mg total) by mouth once daily.    PREMARIN 0.625 mg tablet Take 0.625 mg by mouth once daily.    PREMARIN vaginal cream PLACE 0.5 GRAM VAGINALLY 3 (THREE) TIMES A WEEK.    progesterone (PROMETRIUM) 100 MG capsule Take 100 mg by mouth every morning. 1 Capsule Oral Every day, morning    RESTASIS 0.05 % ophthalmic emulsion PLACE 0.4 MLS (1 DROP TOTAL) INTO BOTH EYES 2 (TWO) TIMES DAILY.    rosuvastatin (CRESTOR) 20 MG tablet Take 1 tablet (20 mg total) by mouth every evening.    sarilumab (KEVZARA) 200 mg/1.14 mL Syrg Inject 1.14 mLs (200 mg total) into the skin every 14 (fourteen) days.    sucralfate (CARAFATE) 1 gram tablet Take 1 tablet (1 g total) by mouth 4 (four) times daily.    syringe with needle (TUBERCULIN SYRINGE) 1 mL 27 x 1/2" Syrg To administer methotrexate 7.5mg sc once a week    teriparatide (FORTEO) 20 mcg/dose - 600 mcg/2.4 mL PnIj Inject 0.08 mLs (20 mcg total) into the skin once daily.    traMADol (ULTRAM) 50 mg tablet Take 1 tablet (50 mg total) by mouth every 24 hours as needed for " "Pain.    triamcinolone acetonide 0.1% (KENALOG) 0.1 % ointment aaa qhs , then vaseline and warm towel for flares. Not more than 2 weeks straight in same location. Avoid face and groin    trospium (SANCTURA) 20 mg Tab tablet TAKE 1 TABLET BY MOUTH TWICE A DAY. DUE FOR FOLLOW UP IN JULY   Last reviewed on 11/4/2020  2:32 PM by Leslee Ellis MA    Review of patient's allergies indicates:   Allergen Reactions    Actemra [tocilizumab] Other (See Comments)     Severe dizziness    Codeine Nausea And Vomiting    Gold au 198 Hives and Rash    Hydroxychloroquine Other (See Comments)     Can't remember the reaction      Iodinated contrast media Other (See Comments)     Other reaction(s): BURNING ALL OVER    Iodine Other (See Comments)     Other reaction(s): BURNING ALL OVER - Iodine dye - Not topical    Sulfa (sulfonamide antibiotics) Other (See Comments)     Can't remember the reaction    Zofran [ondansetron hcl (pf)] Nausea And Vomiting     Pt reports that last time she received zofran she started vomiting again    Methotrexate analogues Nausea Only    Pneumovax 23 [pneumococcal 23-argenis ps vaccine] Other (See Comments)     "sick"    Last reviewed on  11/4/2020 2:31 PM by Leslee Ellis      Tasks added this encounter   No tasks added.   Tasks due within next 3 months   No tasks due.   11/13 [facility]   Pt call in to order forteo and kevzara, both are [facility] next fill date is 11/13 for forteo and 11/16 for kevara. [facility] will follow up on 11/16 to schedule.     Lissa [facility]Bl[facility]  [address] [place] [id number]  Phone: [phone]  Fax: [phone]      "

## 2020-11-16 ENCOUNTER — PATIENT MESSAGE (OUTPATIENT)
Dept: ENDOCRINOLOGY | Facility: CLINIC | Age: 60
End: 2020-11-16

## 2020-11-16 ENCOUNTER — PATIENT MESSAGE (OUTPATIENT)
Dept: GASTROENTEROLOGY | Facility: CLINIC | Age: 60
End: 2020-11-16

## 2020-11-16 ENCOUNTER — PATIENT MESSAGE (OUTPATIENT)
Dept: PAIN MEDICINE | Facility: CLINIC | Age: 60
End: 2020-11-16

## 2020-11-16 ENCOUNTER — INFUSION (OUTPATIENT)
Dept: INFECTIOUS DISEASES | Facility: HOSPITAL | Age: 60
End: 2020-11-16
Attending: INTERNAL MEDICINE
Payer: MEDICARE

## 2020-11-16 ENCOUNTER — PATIENT MESSAGE (OUTPATIENT)
Dept: SPINE | Facility: CLINIC | Age: 60
End: 2020-11-16

## 2020-11-16 ENCOUNTER — CLINICAL SUPPORT (OUTPATIENT)
Dept: REHABILITATION | Facility: HOSPITAL | Age: 60
End: 2020-11-16
Payer: MEDICARE

## 2020-11-16 VITALS
HEIGHT: 61 IN | SYSTOLIC BLOOD PRESSURE: 123 MMHG | DIASTOLIC BLOOD PRESSURE: 76 MMHG | WEIGHT: 160.06 LBS | BODY MASS INDEX: 30.22 KG/M2 | HEART RATE: 94 BPM

## 2020-11-16 DIAGNOSIS — Z74.09 IMPAIRED FUNCTIONAL MOBILITY, BALANCE, GAIT, AND ENDURANCE: ICD-10-CM

## 2020-11-16 DIAGNOSIS — E05.90 SUBCLINICAL HYPERTHYROIDISM: Primary | ICD-10-CM

## 2020-11-16 DIAGNOSIS — T38.0X5A STEROID-INDUCED OSTEOPOROSIS: Primary | ICD-10-CM

## 2020-11-16 DIAGNOSIS — M81.8 STEROID-INDUCED OSTEOPOROSIS: Primary | ICD-10-CM

## 2020-11-16 PROCEDURE — 96372 THER/PROPH/DIAG INJ SC/IM: CPT

## 2020-11-16 PROCEDURE — 97112 NEUROMUSCULAR REEDUCATION: CPT

## 2020-11-16 PROCEDURE — 63600175 PHARM REV CODE 636 W HCPCS: Mod: JG | Performed by: INTERNAL MEDICINE

## 2020-11-16 PROCEDURE — 97140 MANUAL THERAPY 1/> REGIONS: CPT

## 2020-11-16 RX ADMIN — DENOSUMAB 60 MG: 60 INJECTION SUBCUTANEOUS at 02:11

## 2020-11-16 NOTE — PROGRESS NOTES
Patient received Prolia 60 mg injection sub Q in the left arm.  Tolerated well and left in NAD.    Patient is taking Vit D

## 2020-11-16 NOTE — PROGRESS NOTES
"Physical Therapy (PT) Daily Treatment Note     Name: Joyce Peterson  Clinic Number: [id number]    Therapy Diagnosis:   Encounter Diagnosis   Name Primary?    Impaired functional mobility, balance, gait, and endurance      Physician: Jett Julien III, *    Visit Date: 11/16/2020    Physician Orders: PT Eval and TreatInstructed of HEP, formal PT session if wanted by patient and definitely dry needling of the right hip flexor, bursa, and glutes for likely external hip pain.    .   Medical Diagnosis from Referral: pain in right hip  Evaluation Date: 7/31/2020  Authorization Period Expiration:11/4/20   Plan of Care Expiration: 1/15/2021  Visit # / Visits authorized: 1/20      Time In: 1445  Time Out: 1530  Total Billable Time: 23 minutes    Precautions: history of multiple stress fractures    Subjective     Patient reports: She had increased low back pain following last session, and is having pain down both legs.     She was compliant with home exercise program.  Functional change: none yet    Pain: 6/10  Location: right hip      Objective     Joyce received therapeutic exercises to develop strength, endurance and core stabilization for 8 minutes including:  Supine sciatic nerve glide x15 each    Joyce received neuromuscular re-education for 25 minutes to improve coordination, including:  Hip hinge with dowel proprioception 20x5"  Hip hinge with lat pulldown 3x10 gold sportband  Narrow stance balance 2x30 sec EC  Narrow stance balanc on Airex 2x30 sec EC    Joyce received manual therapy for 12 minutes including:   Sacral counternutation mobilizations, grade I-III  Assessment of hip/ankle ROM  Inferior R femoral glides, grade III    Home Exercise Program (HEP) Provided and Patient Education Provided     Patient was provided education on follow up with MD about foot pain and ongoing left hip pain.     Written Home Exercises Provided: Patient instructed to cont prior home program.  Exercises were reviewed and " Joyce was able to demonstrate them prior to the end of the session.  Joyce demonstrated good  understanding of the education provided.     Assessment     Continues to benefit from sacral counternutation mobilizations with relief of symptoms. Followed this up with posterior oblique sling exercises to improve stability. Educated pt on importance of continued strengthening and therapy, in conjunction with possible injection for most benefit.     Joyce is progressing well towards her goals.     Patient will continue to benefit from skilled outpatient physical therapy to address the deficits listed in the problem list box on initial evaluation, provide patient/family education and to maximize patient's level of independence in the home and community environment.     Patient prognosis is Good.     Patient's spiritual, cultural and educational needs considered and pt agreeable to plan of care and goals.    Anticipated barriers to physical therapy: foot pain    Goals: Short Term Goals (3-4 weeks):  1. Patient will have decreased complaints of right hip pain to 3/10 at rest and 5/10 at worst at the end of the day.progressing not met   2. Patient will ambulate without a trendelenburg gait  using an assistive device (single point cane) 300 feet indoors. MET no longer uses AD 9/8/20  3. Patient will be independent and compliant with issued HEP.MET 8/11/20  4. Patient will have 75% decreased tenderness to palpation of the right greater trochanter.     Long Term Goals (4-6 weeks):   1. Patient will be pain free right hip at rest and no more than 3/10 at worst at end of the day  2. Patient will have improved strength of the right hip to 5/5 flexion/abduction/extension for increased ability to perform activities of daily living.  3. Patient will be able to resume prior functional level with no deficits, including normal gait without assistive device.   4. Patient will be able to ascend/descend steps in clinic foot over foot  without hand rails       Plan    Plan of care Certification: 7/31/2020 to 1/15/2021.     Outpatient Physical Therapy 2 times weekly for 6 weeks to include the following interventions: Electrical Stimulation with dry needling, Gait Training, Manual Therapy, Neuromuscular Re-ed, Patient Education and Therapeutic Exercise       Love Lay, PT

## 2020-11-17 NOTE — TELEPHONE ENCOUNTER
Ayde Moore,    I checked the Cisapride disallowed med-list and it seems Luther is on that list. The other medications she mentioned are fine.    Lynette is going to look into the protocol requirements for this specific case.    Please let me know if you need anything else.    -Margarita

## 2020-11-18 ENCOUNTER — PATIENT MESSAGE (OUTPATIENT)
Dept: REHABILITATION | Facility: HOSPITAL | Age: 60
End: 2020-11-18

## 2020-11-19 ENCOUNTER — OFFICE VISIT (OUTPATIENT)
Dept: SPINE | Facility: CLINIC | Age: 60
End: 2020-11-19
Payer: MEDICARE

## 2020-11-19 ENCOUNTER — TELEPHONE (OUTPATIENT)
Dept: ADMINISTRATIVE | Facility: OTHER | Age: 60
End: 2020-11-19

## 2020-11-19 VITALS
BODY MASS INDEX: 30.22 KG/M2 | SYSTOLIC BLOOD PRESSURE: 115 MMHG | WEIGHT: 160.06 LBS | HEIGHT: 61 IN | DIASTOLIC BLOOD PRESSURE: 72 MMHG | TEMPERATURE: 98 F | HEART RATE: 104 BPM

## 2020-11-19 DIAGNOSIS — M25.551 RIGHT HIP PAIN: ICD-10-CM

## 2020-11-19 DIAGNOSIS — M05.79 RHEUMATOID ARTHRITIS INVOLVING MULTIPLE SITES WITH POSITIVE RHEUMATOID FACTOR: ICD-10-CM

## 2020-11-19 DIAGNOSIS — M47.816 SPONDYLOSIS OF LUMBAR REGION WITHOUT MYELOPATHY OR RADICULOPATHY: Primary | ICD-10-CM

## 2020-11-19 DIAGNOSIS — M54.50 CHRONIC BILATERAL LOW BACK PAIN WITHOUT SCIATICA: ICD-10-CM

## 2020-11-19 DIAGNOSIS — G89.29 CHRONIC BILATERAL LOW BACK PAIN WITHOUT SCIATICA: ICD-10-CM

## 2020-11-19 PROCEDURE — 1125F PR PAIN SEVERITY QUANTIFIED, PAIN PRESENT: ICD-10-PCS | Mod: S$GLB,,, | Performed by: NURSE PRACTITIONER

## 2020-11-19 PROCEDURE — 1125F AMNT PAIN NOTED PAIN PRSNT: CPT | Mod: S$GLB,,, | Performed by: NURSE PRACTITIONER

## 2020-11-19 PROCEDURE — 99999 PR PBB SHADOW E&M-EST. PATIENT-LVL III: ICD-10-PCS | Mod: PBBFAC,,, | Performed by: NURSE PRACTITIONER

## 2020-11-19 PROCEDURE — 3008F PR BODY MASS INDEX (BMI) DOCUMENTED: ICD-10-PCS | Mod: CPTII,S$GLB,, | Performed by: NURSE PRACTITIONER

## 2020-11-19 PROCEDURE — 99213 OFFICE O/P EST LOW 20 MIN: CPT | Mod: S$GLB,,, | Performed by: NURSE PRACTITIONER

## 2020-11-19 PROCEDURE — 99213 PR OFFICE/OUTPT VISIT, EST, LEVL III, 20-29 MIN: ICD-10-PCS | Mod: S$GLB,,, | Performed by: NURSE PRACTITIONER

## 2020-11-19 PROCEDURE — 3008F BODY MASS INDEX DOCD: CPT | Mod: CPTII,S$GLB,, | Performed by: NURSE PRACTITIONER

## 2020-11-19 PROCEDURE — 99999 PR PBB SHADOW E&M-EST. PATIENT-LVL III: CPT | Mod: PBBFAC,,, | Performed by: NURSE PRACTITIONER

## 2020-11-19 RX ORDER — CYCLOBENZAPRINE HCL 5 MG
TABLET ORAL
COMMUNITY
Start: 2020-11-09 | End: 2021-01-27

## 2020-11-19 NOTE — PROGRESS NOTES
Chronic Pain-Established Note (Follow up visit)         SUBJECTIVE:    Interval History 11/19/2020:  The patient is here today to discuss lower back pain.  Her pain is mainly located across the lower back and is aching in nature.  She does have intermittent and bilateral posterior leg pain.  She feels as though previous facet joint injections gave her 90% relief for similar back pain in the past for approximately 7 months.  She has been doing physical therapy for her foot in her hip pain and thinks that this really aggravated her back.  She is asking for repeat facet joint injections.  Her pain is worse when she wakes up in the morning when she sits for prolonged time periods.  Her pain today is 4/10.    Interval History 9/16/2020:  Patient is here today for follow-up of right-sided lower back, hip, and leg pain.  She is now s/p right L3/4 and L4/5 TF MADELINE with about 60% relief of severe leg pain.  However, she continues with significant right hip and groin pain.  She plans to make a follow-up with orthopedics at home would.  Additionally, she continues with left posterior foot pain for which she is followed by Dr. Steele.  She is currently in physical therapy twice weekly for her hip.  Her pain today is 7/10.  She denies any recent changes in respiratory status such as fever, cough, or shortness of breath.    Previous Encounter:  Joyce Peterson presents to the clinic for a follow-up appointment for right sided back and hip pain.  She is now s/p right hip injection with no relief, not even short term.  She denies any respiratory changes after the procedure including fever, cough, or SOB.  She did have some relief with lumbar facet injections for back pain in the past.  Her biggest complaint today is right sided back and lateral hip pain with radiation into the front and side of the hip, stopping at the knee.  She denies radiation past the knee at this time.  She denies symptoms on the left. Since the last  visit, Joyce Peterson states the pain has been worsening. Current pain intensity is 7/10.    Pain Disability Index Review:  Last 3 PDI Scores 11/19/2020 9/16/2020 6/17/2020   Pain Disability Index (PDI) 42 [phone] 55       Pain Medications:  Aleve    Opioid Contract: no     report:  Not applicable    Pain Procedures:   3/12/20 Bilateral L4/5 and L5/S1 facet injection- significant benefit of back pain  7/30/20 Right hip injection- no relief   8/31/20 Right right L3/4 and L4/5 TF MADELINE- 60% relief of leg pain    Physical Therapy/Home Exercise: yes    Imaging:     Narrative & Impression     EXAMINATION:  MRI HIP WITHOUT CONTRAST RIGHT     CLINICAL HISTORY:  Hip pain, acute, fracture suspected, neg xray or equivocal (Age => 50y);concern for stress fracture of femur with strong history of this previous.;  Pain in right hip     TECHNIQUE:  MRI right hip performed without contrast.     COMPARISON:  Radiographs 07/20/2020     FINDINGS:  Bone marrow signal is maintained.  No fracture or infiltrative process.     There is tear of the anterior to superior acetabular labrum.  No paralabral cyst.  Ligamentum teres is attenuated.  There is chondral fissuring over the superolateral femoral head and acetabulum.  No significant joint effusion.     There is tendinosis of the gluteus minimus and medius.  No iliopsoas or trochanteric bursitis.     Note made of left hip gamma nail.     Limited assessment of pelvic soft tissues demonstrates a small uterine fibroid.  No pelvic ascites or lymphadenopathy.     Impression:     1. No fracture or marrow infiltrative process.  2. Degenerative changes of the right hip with tear of the anterior to superior acetabular labrum.     Narrative & Impression     EXAMINATION:  MRI LUMBAR SPINE WITHOUT CONTRAST     CLINICAL HISTORY:  severe acute low back pain; Low back pain     TECHNIQUE:  Multiplanar, multisequence MR images were acquired from the thoracolumbar junction to the sacrum without  contrast.     COMPARISON:  MRI of the lumbar spine from 02/20/2017.  Correlation is made to prior radiographs of the lumbar spine from 02/28/2020.     FINDINGS:  Alignment: There is grade 1 anterolisthesis of L4 on L5.  Alignment is otherwise anatomic.     Vertebrae: There is diffuse bone marrow signal heterogeneity with several hemangiomas.  No evidence for marrow infiltrative process or recent fracture.     Discs: There is multilevel disc desiccation.  Mild disc height loss noted at L4-L5.     Cord: Normal.  Conus terminates at L1.     Degenerative findings:     T12-L1: No spinal canal stenosis or neural foraminal narrowing.     L1-L2: Circumferential disc bulge with a left paracentral disc protrusion.  No spinal canal stenosis or neural foraminal narrowing.     L2-L3: Circumferential disc bulge with mild bilateral facet arthropathy and ligamentum flavum hypertrophy resulting in mild left neural foraminal narrowing.  No spinal canal stenosis.     L3-L4: Circumferential disc bulge with mild bilateral facet arthropathy and ligamentum flavum hypertrophy.  No spinal canal stenosis or neural foraminal narrowing.     L4-L5: Grade 1 anterolisthesis of L4 on L5 with a circumferential disc bulge and superimposed central disc extrusion migrating superiorly and severe bilateral facet arthropathy and ligamentum flavum hypertrophy with several small synovial cysts posteriorly result in severe spinal canal stenosis and moderate left, mild right neural foraminal narrowing.     L5-S1: There is prominent epidural fat effacing the thecal sac.  There is a circumferential disc bulge with mild bilateral facet arthropathy resulting in mild left neural foraminal narrowing.     Paraspinal muscles & soft tissues: Unremarkable.     Impression:     1. Multilevel degenerative changes of the lumbar spine, most significant at the level of L4-L5 where there is a disc extrusion contributing to severe spinal canal stenosis and moderate left and  "mild right neural foraminal narrowing.         Allergies:   Review of patient's allergies indicates:   Allergen Reactions    Actemra [tocilizumab] Other (See Comments)     Severe dizziness    Codeine Nausea And Vomiting    Gold au 198 Hives and Rash    Hydroxychloroquine Other (See Comments)     Can't remember the reaction      Iodinated contrast media Other (See Comments)     Other reaction(s): BURNING ALL OVER    Iodine Other (See Comments)     Other reaction(s): BURNING ALL OVER - Iodine dye - Not topical    Sulfa (sulfonamide antibiotics) Other (See Comments)     Can't remember the reaction    Zofran [ondansetron hcl (pf)] Nausea And Vomiting     Pt reports that last time she received zofran she started vomiting again    Methotrexate analogues Nausea Only    Pneumovax 23 [pneumococcal 23-argenis ps vaccine] Other (See Comments)     "sick"       Current Medications:   Current Outpatient Medications   Medication Sig Dispense Refill    albuterol (ACCUNEB) 1.25 mg/3 mL Nebu Take 3 mLs (1.25 mg total) by nebulization every 6 (six) hours as needed. Rescue 1 Box 11    albuterol (PROVENTIL HFA) 90 mcg/actuation inhaler Inhale 2 puffs into the lungs every 6 (six) hours as needed. Rescue 20.1 g 11    albuterol-ipratropium (DUO-NEB) 2.5 mg-0.5 mg/3 mL nebulizer solution Take 3 mLs by nebulization every 6 (six) hours as needed for Wheezing. Rescue 1 Box 0    aspirin 81 mg Tab Take 81 mg by mouth every morning.       azelastine (ASTELIN) 137 mcg (0.1 %) nasal spray 1 spray (137 mcg total) by Nasal route 2 (two) times daily. 30 mL 0    benzonatate (TESSALON) 100 MG capsule Take 1 capsule by mouth three times a day 9 capsule 0    budesonide-formoterol 160-4.5 mcg (SYMBICORT) 160-4.5 mcg/actuation HFAA Inhale 2 puffs into the lungs every 12 (twelve) hours. 3 Inhaler 3    calcium citrate-vitamin D (CITRACAL + D) 315-200 mg-unit per tablet Take 1 tablet by mouth 2 (two) times daily.       ciclopirox (PENLAC) 8 % " Soln Apply daily to affected nail. Must remove and restart weekly 1 Bottle 5    diclofenac sodium (VOLTAREN) 1 % Gel APPLY 2 GRAMS TOPICALLY 4 (FOUR) TIMES DAILY AS NEEDED.      DULoxetine (CYMBALTA) 20 MG capsule Take 1 capsule (20 mg total) by mouth once daily. 30 capsule 11    erythromycin (ROMYCIN) ophthalmic ointment Apply to affected external eyelid tissue two times per day as directed for fourteen days 3.5 g 2    fluconazole (DIFLUCAN) 100 MG tablet       fluticasone propionate (FLONASE) 50 mcg/actuation nasal spray 2 sprays (100 mcg total) by Each Nostril route once daily. 9.9 mL 11    INV PROPULSID 10 MG Take 1 tablets (20 mg) by mouth 4 (four) times daily. FOR INVESTIGATIONAL USE ONLY. Protocol CIS-USA-154 1440 each 0    leflunomide (ARAVA) 20 MG Tab Take 1 tablet (20 mg total) by mouth once daily. 90 tablet 0    methenamine (HIPREX) 1 gram Tab Take 1 tablet (1 g total) by mouth 2 (two) times daily. 60 tablet 11    methenamine (HIPREX) 1 gram Tab Take 1 tablet (1 g total) by mouth 2 (two) times daily. 180 tablet 3    mirabegron (MYRBETRIQ) 25 mg Tb24 ER tablet Take 1 tablet (25 mg total) by mouth once daily. 30 tablet 11    montelukast (SINGULAIR) 10 mg tablet Take 1 tablet (10 mg total) by mouth nightly. 90 tablet 3    naproxen (NAPROSYN) 500 MG tablet TAKE 1 TABLET BY MOUTH TWICE A DAY AS NEEDED 180 tablet 0    omeprazole (PRILOSEC) 40 MG capsule Take 1 capsule (40 mg total) by mouth every morning. 30 capsule 6    omeprazole-sodium bicarbonate (ZEGERID) 40-1.1 mg-gram per capsule Take 1 capsule by mouth every morning. 90 capsule 3    POTASSIUM ORAL Take by mouth once daily.      predniSONE (DELTASONE) 5 MG tablet Take 1 tablet (5 mg total) by mouth once daily. 90 tablet 1    PREMARIN 0.625 mg tablet Take 0.625 mg by mouth once daily.  4    PREMARIN vaginal cream PLACE 0.5 GRAM VAGINALLY 3 (THREE) TIMES A WEEK. 30 g 1    progesterone (PROMETRIUM) 100 MG capsule Take 100 mg by mouth  "every morning. 1 Capsule Oral Every day, morning      RESTASIS 0.05 % ophthalmic emulsion PLACE 0.4 MLS (1 DROP TOTAL) INTO BOTH EYES 2 (TWO) TIMES DAILY. 180 mL 1    rosuvastatin (CRESTOR) 20 MG tablet Take 1 tablet (20 mg total) by mouth every evening. 90 tablet 3    sarilumab (KEVZARA) 200 mg/1.14 mL Syrg Inject 1.14 mLs (200 mg total) into the skin every 14 (fourteen) days. 2.28 mL 2    sucralfate (CARAFATE) 1 gram tablet Take 1 tablet (1 g total) by mouth 4 (four) times daily. 360 tablet 3    syringe with needle (TUBERCULIN SYRINGE) 1 mL 27 x 1/2" Syrg To administer methotrexate 7.5mg sc once a week 12 Syringe 3    teriparatide (FORTEO) 20 mcg/dose - 600 mcg/2.4 mL PnIj Inject 0.08 mLs (20 mcg total) into the skin once daily. 2.4 mL 11    traMADol (ULTRAM) 50 mg tablet Take 1 tablet (50 mg total) by mouth every 24 hours as needed for Pain. 7 tablet 0    triamcinolone acetonide 0.1% (KENALOG) 0.1 % ointment aaa qhs , then vaseline and warm towel for flares. Not more than 2 weeks straight in same location. Avoid face and groin 45 g 1    trospium (SANCTURA) 20 mg Tab tablet TAKE 1 TABLET BY MOUTH TWICE A DAY. DUE FOR FOLLOW UP IN JULY 60 tablet 0    XIIDRA 5 % Dpet INSTILL ONE DROPP INTO BOTH EYES TWICE A DAY 60 mL 10    cyclobenzaprine (FLEXERIL) 5 MG tablet        Current Facility-Administered Medications   Medication Dose Route Frequency Provider Last Rate Last Dose    betamethasone acetate-betamethasone sodium phosphate injection 6 mg  6 mg Intra-articular Once Tj Mayfield MD         Facility-Administered Medications Ordered in Other Visits   Medication Dose Route Frequency Provider Last Rate Last Dose    0.9%  NaCl infusion   Intravenous Continuous Callum Singer MD   Stopped at 05/21/19 1100       REVIEW OF SYSTEMS:    GENERAL:  No weight loss, malaise or fevers.  HEENT:  Negative for frequent or significant headaches.  NECK:  Negative for lumps, goiter, pain and significant neck " swelling.  RESPIRATORY:  Negative for cough, wheezing or shortness of breath. Asthma.  CARDIOVASCULAR:  Negative for chest pain, leg swelling or palpitations. CAD.  GI:  Negative for abdominal discomfort, blood in stools or black stools or change in bowel habits.  MUSCULOSKELETAL:  See HPI.  SKIN:  Negative for lesions, rash, and itching.  PSYCH:  Negative for sleep disturbance, mood disorder and recent psychosocial stressors.  HEMATOLOGY/LYMPHOLOGY:  Negative for prolonged bleeding, bruising easily or swollen nodes.  NEURO:   No history of headaches, syncope, paralysis, seizures or tremors.  All other reviewed and negative other than HPI.    Past Medical History:  Past Medical History:   Diagnosis Date    Acid reflux     Allergy     Anemia     Asthma     Coronary artery disease     Degenerative disc disease     Dry eyes     Dry mouth     Gastroparesis     Hyperlipidemia     Lateral meniscus derangement 4/6/2016    Lobular carcinoma in situ     Osteoarthritis     Osteoporosis     Rheumatoid arthritis(714.0)     Umbilical hernia 8/13/2015       Past Surgical History:  Past Surgical History:   Procedure Laterality Date    BREAST BIOPSY Left 01/29/2002    core bx    CARPAL TUNNEL RELEASE Right 05/2017    CHOLECYSTECTOMY  2004    COLONOSCOPY      10/11    COLONOSCOPY N/A 6/29/2017    Procedure: COLONOSCOPY;  Surgeon: López Moore MD;  Location: [address]);  Service: Endoscopy;  Laterality: N/A;    INJECTION OF FACET JOINT Bilateral 3/12/2020    Procedure: FACET JOINT INJECTION (LUMBAR BLOCK) BILATERAL L4-5 AND L5-S1 DIRECT REFERRAL;  Surgeon: Tj Mayfield MD;  Location: [facility];  Service: Pain Management;  Laterality: Bilateral;  NEEDS CONSENT    INJECTION OF JOINT Right 7/30/2020    Procedure: INJECTION, JOINT, RIGHT HIP Interarticular under flouro;  Surgeon: Tj Mayfield MD;  Location: [facility];  Service: Pain Management;  Laterality: Right;  INJECTION, JOINT, RIGHT HIP  Interarticular under flouro    INTRAMEDULLARY RODDING OF FEMUR Left 5/21/2019    Procedure: INSERTION, INTRAMEDULLARY ARIEL, FEMUR;  Surgeon: López Duff MD;  Location: [facility] OR 2ND FLR;  Service: Orthopedics;  Laterality: Left;    REPAIR OF TRICEPS TENDON Left 10/17/2018    Procedure: REPAIR, TENDON, TRICEPS left elbow;  Surgeon: Staci Yarbrough MD;  Location: [facility] OR 1ST FLR;  Service: Orthopedics;  Laterality: Left;  Anesthesia: General and regional. PRONE, k-wire , hand pan 1 and pan 2, CALL ARTHREX/Tamera notified 10-12 LO    TONSILLECTOMY      TRANSFORAMINAL EPIDURAL INJECTION OF STEROID Right 8/31/2020    Procedure: INJECTION, STEROID, EPIDURAL, TRANSFORAMINAL,  APPROACH, L3-L4 and L4-L5 need consent;  Surgeon: Tj Mayfield MD;  Location: [facility] PAIN [facility]GT;  Service: Pain Management;  Laterality: Right;    TUBAL LIGATION  2003    UPPER GASTROINTESTINAL ENDOSCOPY      10/11    uterine ablation  2003       Family History:  Family History   Problem Relation Age of Onset    Cancer Mother         Lung Cancer    Emphysema Mother     Heart attack Mother     Cancer Father         Lung Cancer    Osteoarthritis Father     Lung cancer Father     Skin cancer Father     Heart disease Brother     Heart attack Brother     Osteoarthritis Paternal Aunt     Retinal detachment Maternal Aunt     No Known Problems Sister     No Known Problems Maternal Uncle     No Known Problems Paternal Uncle     No Known Problems Maternal Grandmother     No Known Problems Maternal Grandfather     No Known Problems Paternal Grandmother     No Known Problems Paternal Grandfather     Colon cancer Neg Hx     Esophageal cancer Neg Hx     Stomach cancer Neg Hx     Celiac disease Neg Hx     Diabetes Neg Hx     Thyroid disease Neg Hx     Amblyopia Neg Hx     Blindness Neg Hx     Cataracts Neg Hx     Glaucoma Neg Hx     Hypertension Neg Hx     Macular degeneration Neg Hx     Strabismus Neg Hx     Stroke  "Neg Hx        Social History:  Social History     Socioeconomic History    Marital status:      Spouse name: Not on file    Number of children: Not on file    Years of education: Not on file    Highest education level: Not on file   Occupational History    Not on file   Social Needs    Financial resource strain: Not hard at all    Food insecurity     Worry: Never true     Inability: Never true    Transportation needs     Medical: No     Non-medical: No   Tobacco Use    Smoking status: Never Smoker    Smokeless tobacco: Never Used   Substance and Sexual Activity    Alcohol use: Yes     Frequency: Monthly or less     Drinks per session: Patient refused     Binge frequency: Never     Comment: occasionally    Drug use: No    Sexual activity: Yes     Partners: Male     Birth control/protection: Surgical   Lifestyle    Physical activity     Days per week: 2 days     Minutes per session: 50 min    Stress: Not at all   Relationships    Social connections     Talks on phone: More than three times a week     Gets together: More than three times a week     Attends Christian service: Not on file     Active member of club or organization: Yes     Attends meetings of clubs or organizations: More than 4 times per year     Relationship status:    Other Topics Concern    Are you pregnant or think you may be? Not Asked    Breast-feeding Not Asked   Social History Narrative    Not on file       OBJECTIVE:    /72 (BP Location: Left arm, Patient Position: Sitting)   Pulse 104   Temp 98.4 °F (36.9 °C)   Ht 5' 1.2" (1.554 m)   Wt 72.6 kg (160 lb 0.9 oz)   LMP  (LMP Unknown)   BMI 30.04 kg/m²     PHYSICAL EXAMINATION:    General appearance: Well appearing, in no acute distress, alert and oriented x3.  Psych:  Mood and affect appropriate.  Back: TTP over lumbar facet joints bilaterally.  Pain on lumbar extension.  Positive facet loading bilaterally, L>R.  Negative SLR bilaterally.  Right Hip: " Decreased ROM of right hip. TTP over right GT bursa.    Neuro: No loss of sensation.   Gait: Antalgic- ambulates without assistance.    ASSESSMENT: 60 y.o. year old female with right sided back and hip pain, consistent with the following diagnoses:     1. Spondylosis of lumbar region without myelopathy or radiculopathy     2. Right hip pain     3. Rheumatoid arthritis involving multiple sites with positive rheumatoid factor     4. Chronic bilateral low back pain without sciatica           PLAN:     - Previous imaging was reviewed and discussed with the patient today.    - Schedule for bilateral L4/5 and L5/S1 facet injections.    - Continue with PT for hip and foot.    - Can continue current medications.    - RTC 4 weeks after injections.    - Counseled patient regarding the importance of constant sleeping habits and physical therapy.    The above plan and management options were discussed at length with patient. Patient is in agreement with the above and verbalized understanding.    Valarie Law  11/19/2020

## 2020-11-19 NOTE — TELEPHONE ENCOUNTER
----- Message from JAYME Rojo sent at 11/19/2020  2:52 PM CST -----  Regarding: procedures  Please call and schedule for  Bilateral L4/5 and L5/S1 facet injection with Eissa under fluoro.  F/U 2 weeks after with me.  No abx, no blood thinners and no diabetes. Thanks.

## 2020-11-20 ENCOUNTER — OFFICE VISIT (OUTPATIENT)
Dept: INTERNAL MEDICINE | Facility: CLINIC | Age: 60
End: 2020-11-20
Payer: MEDICARE

## 2020-11-20 VITALS
HEIGHT: 61 IN | SYSTOLIC BLOOD PRESSURE: 102 MMHG | TEMPERATURE: 98 F | OXYGEN SATURATION: 96 % | HEART RATE: 101 BPM | WEIGHT: 155.19 LBS | DIASTOLIC BLOOD PRESSURE: 72 MMHG | BODY MASS INDEX: 29.3 KG/M2

## 2020-11-20 DIAGNOSIS — R68.83 CHILLS: ICD-10-CM

## 2020-11-20 DIAGNOSIS — R11.0 NAUSEA: ICD-10-CM

## 2020-11-20 DIAGNOSIS — R19.7 DIARRHEA, UNSPECIFIED TYPE: Primary | ICD-10-CM

## 2020-11-20 PROCEDURE — 3008F PR BODY MASS INDEX (BMI) DOCUMENTED: ICD-10-PCS | Mod: CPTII,S$GLB,, | Performed by: INTERNAL MEDICINE

## 2020-11-20 PROCEDURE — U0003 INFECTIOUS AGENT DETECTION BY NUCLEIC ACID (DNA OR RNA); SEVERE ACUTE RESPIRATORY SYNDROME CORONAVIRUS 2 (SARS-COV-2) (CORONAVIRUS DISEASE [COVID-19]), AMPLIFIED PROBE TECHNIQUE, MAKING USE OF HIGH THROUGHPUT TECHNOLOGIES AS DESCRIBED BY CMS-2020-01-R: HCPCS

## 2020-11-20 PROCEDURE — 99214 PR OFFICE/OUTPT VISIT, EST, LEVL IV, 30-39 MIN: ICD-10-PCS | Mod: S$GLB,,, | Performed by: INTERNAL MEDICINE

## 2020-11-20 PROCEDURE — 3008F BODY MASS INDEX DOCD: CPT | Mod: CPTII,S$GLB,, | Performed by: INTERNAL MEDICINE

## 2020-11-20 PROCEDURE — 99214 OFFICE O/P EST MOD 30 MIN: CPT | Mod: S$GLB,,, | Performed by: INTERNAL MEDICINE

## 2020-11-20 PROCEDURE — 99999 PR PBB SHADOW E&M-EST. PATIENT-LVL V: CPT | Mod: PBBFAC,,, | Performed by: INTERNAL MEDICINE

## 2020-11-20 PROCEDURE — 99999 PR PBB SHADOW E&M-EST. PATIENT-LVL V: ICD-10-PCS | Mod: PBBFAC,,, | Performed by: INTERNAL MEDICINE

## 2020-11-20 PROCEDURE — 1125F PR PAIN SEVERITY QUANTIFIED, PAIN PRESENT: ICD-10-PCS | Mod: S$GLB,,, | Performed by: INTERNAL MEDICINE

## 2020-11-20 PROCEDURE — 1125F AMNT PAIN NOTED PAIN PRSNT: CPT | Mod: S$GLB,,, | Performed by: INTERNAL MEDICINE

## 2020-11-20 RX ORDER — PROMETHAZINE HYDROCHLORIDE 25 MG/1
25 TABLET ORAL EVERY 6 HOURS PRN
Qty: 30 TABLET | Refills: 1 | Status: SHIPPED | OUTPATIENT
Start: 2020-11-20 | End: 2023-05-26 | Stop reason: CLARIF

## 2020-11-21 ENCOUNTER — TELEPHONE (OUTPATIENT)
Dept: INTERNAL MEDICINE | Facility: CLINIC | Age: 60
End: 2020-11-21

## 2020-11-21 NOTE — TELEPHONE ENCOUNTER
Called pt to check on her. She states nausea has resolved. No fever or vomiting. She still has diarrhea and abdominal spasms; pain is no worse than yesterday. Advised her that covid test is still pending. Once result is back, we will proceed with her work up especially if it is negative. Again advised that if symptoms worsen, go straight to ER to be further evaluated in case this is a bacterial infection. She is agreeable to plan.

## 2020-11-22 ENCOUNTER — PATIENT MESSAGE (OUTPATIENT)
Dept: REHABILITATION | Facility: HOSPITAL | Age: 60
End: 2020-11-22

## 2020-11-22 ENCOUNTER — PATIENT MESSAGE (OUTPATIENT)
Dept: INTERNAL MEDICINE | Facility: CLINIC | Age: 60
End: 2020-11-22

## 2020-11-22 DIAGNOSIS — R10.9 ABDOMINAL PAIN, UNSPECIFIED ABDOMINAL LOCATION: ICD-10-CM

## 2020-11-22 DIAGNOSIS — R19.7 DIARRHEA, UNSPECIFIED TYPE: Primary | ICD-10-CM

## 2020-11-22 LAB — SARS-COV-2 RNA RESP QL NAA+PROBE: NOT DETECTED

## 2020-11-23 ENCOUNTER — PATIENT MESSAGE (OUTPATIENT)
Dept: INTERNAL MEDICINE | Facility: CLINIC | Age: 60
End: 2020-11-23

## 2020-11-23 ENCOUNTER — PATIENT MESSAGE (OUTPATIENT)
Dept: GASTROENTEROLOGY | Facility: CLINIC | Age: 60
End: 2020-11-23

## 2020-11-23 DIAGNOSIS — R19.7 DIARRHEA, UNSPECIFIED TYPE: Primary | ICD-10-CM

## 2020-11-23 RX ORDER — POTASSIUM CHLORIDE 20 MEQ/1
20 TABLET, EXTENDED RELEASE ORAL DAILY
Qty: 7 TABLET | Refills: 0 | Status: SHIPPED | OUTPATIENT
Start: 2020-11-23 | End: 2021-01-27

## 2020-11-23 NOTE — TELEPHONE ENCOUNTER
Her CT is ordered with only oral contrast and not IV contrast .     We cannot give her something to stop the diarrhea until we get her stool cultures as it may actually make the problem worse.     Advise she hydrate well but do not take imodium for now.

## 2020-11-24 ENCOUNTER — PATIENT MESSAGE (OUTPATIENT)
Dept: UROLOGY | Facility: CLINIC | Age: 60
End: 2020-11-24

## 2020-11-24 ENCOUNTER — LAB VISIT (OUTPATIENT)
Dept: LAB | Facility: HOSPITAL | Age: 60
End: 2020-11-24
Attending: INTERNAL MEDICINE
Payer: MEDICARE

## 2020-11-24 DIAGNOSIS — R19.7 DIARRHEA, UNSPECIFIED TYPE: ICD-10-CM

## 2020-11-24 DIAGNOSIS — R10.9 ABDOMINAL PAIN, UNSPECIFIED ABDOMINAL LOCATION: ICD-10-CM

## 2020-11-24 LAB
C DIFF GDH STL QL: NEGATIVE
C DIFF TOX A+B STL QL IA: NEGATIVE

## 2020-11-24 PROCEDURE — 87209 SMEAR COMPLEX STAIN: CPT

## 2020-11-24 PROCEDURE — 87046 STOOL CULTR AEROBIC BACT EA: CPT

## 2020-11-24 PROCEDURE — 87045 FECES CULTURE AEROBIC BACT: CPT

## 2020-11-24 PROCEDURE — 89055 LEUKOCYTE ASSESSMENT FECAL: CPT

## 2020-11-24 PROCEDURE — 87324 CLOSTRIDIUM AG IA: CPT

## 2020-11-24 PROCEDURE — 87449 NOS EACH ORGANISM AG IA: CPT

## 2020-11-24 PROCEDURE — 87427 SHIGA-LIKE TOXIN AG IA: CPT

## 2020-11-24 PROCEDURE — 87329 GIARDIA AG IA: CPT

## 2020-11-24 NOTE — TELEPHONE ENCOUNTER
Spoke with patient.  Offered to schedule an appointment with one of the NP's this week since Dr.James Moore is out of the office.   Patient stated she is being worked up by her PCP.  Will be submitting a stool specimen today for C-diff.  She will wait for those results.  If it comes back negative she will call back to schedule an appointment.  Delilah

## 2020-11-25 ENCOUNTER — PATIENT MESSAGE (OUTPATIENT)
Dept: GASTROENTEROLOGY | Facility: CLINIC | Age: 60
End: 2020-11-25

## 2020-11-25 ENCOUNTER — TELEPHONE (OUTPATIENT)
Dept: GASTROENTEROLOGY | Facility: CLINIC | Age: 60
End: 2020-11-25

## 2020-11-25 ENCOUNTER — PATIENT MESSAGE (OUTPATIENT)
Dept: INTERNAL MEDICINE | Facility: CLINIC | Age: 60
End: 2020-11-25

## 2020-11-25 ENCOUNTER — OFFICE VISIT (OUTPATIENT)
Dept: GASTROENTEROLOGY | Facility: CLINIC | Age: 60
End: 2020-11-25
Payer: MEDICARE

## 2020-11-25 VITALS — BODY MASS INDEX: 29.12 KG/M2 | WEIGHT: 154.13 LBS

## 2020-11-25 DIAGNOSIS — R19.7 DIARRHEA, UNSPECIFIED TYPE: ICD-10-CM

## 2020-11-25 LAB
CRYPTOSP AG STL QL IA: NEGATIVE
E COLI SXT1 STL QL IA: NEGATIVE
E COLI SXT2 STL QL IA: NEGATIVE
G LAMBLIA AG STL QL IA: NEGATIVE
WBC #/AREA STL HPF: NORMAL /[HPF]

## 2020-11-25 PROCEDURE — 3008F BODY MASS INDEX DOCD: CPT | Mod: CPTII,S$GLB,, | Performed by: INTERNAL MEDICINE

## 2020-11-25 PROCEDURE — 99213 PR OFFICE/OUTPT VISIT, EST, LEVL III, 20-29 MIN: ICD-10-PCS | Mod: S$GLB,,, | Performed by: INTERNAL MEDICINE

## 2020-11-25 PROCEDURE — 99999 PR PBB SHADOW E&M-EST. PATIENT-LVL IV: CPT | Mod: PBBFAC,,, | Performed by: INTERNAL MEDICINE

## 2020-11-25 PROCEDURE — 99999 PR PBB SHADOW E&M-EST. PATIENT-LVL IV: ICD-10-PCS | Mod: PBBFAC,,, | Performed by: INTERNAL MEDICINE

## 2020-11-25 PROCEDURE — 3008F PR BODY MASS INDEX (BMI) DOCUMENTED: ICD-10-PCS | Mod: CPTII,S$GLB,, | Performed by: INTERNAL MEDICINE

## 2020-11-25 PROCEDURE — 99213 OFFICE O/P EST LOW 20 MIN: CPT | Mod: S$GLB,,, | Performed by: INTERNAL MEDICINE

## 2020-11-25 NOTE — PROGRESS NOTES
Subjective:       Patient ID: Joyce Peterson is a 60 y.o. female.    Chief Complaint: Diarrhea (8 DAYS), Abdominal Cramping, Abdominal Pain, Nausea, and Gastroesophageal Reflux    This is a 60-year-old female with numerous medical problems and allergies here for evaluation of acute onset diarrhea.  Patient noted the symptoms beginning just over a week ago described as profuse, watery diarrhea including nocturnal symptoms.  Initially with suspected possibly due to C diff given she had received antibiotics for UTI, though this testing has been negative.  Stool tests have been done yesterday and are otherwise still pending.  Over the week the symptoms have improved with less nocturnal symptoms and less frequent diarrhea though she still notes numerous episodes of watery diarrhea daily associated with some abdominal cramping.  This is complicated by her numerous medical problems and her outpatient medication list consisting of 43 medicines.  No fevers, chills or bleeding overtly.  She does report having lost 12 lb since being seen at [facility].    The following portions of the patient's history were reviewed and updated as appropriate: allergies, current medications, past family history, past medical history, past social history, past surgical history and problem list.    (Portions of this note were dictated using voice recognition software and may contain dictation related errors in spelling/grammar/syntax not found on text review)  HPI  Review of Systems   Constitutional: Positive for unexpected weight change. Negative for fever.   Respiratory: Negative for apnea and chest tightness.    Cardiovascular: Negative for palpitations and leg swelling.   Gastrointestinal: Positive for abdominal distention, diarrhea and nausea.         Objective:      Physical Exam  Constitutional:       Appearance: Normal appearance.   HENT:      Head: Normocephalic and atraumatic.   Eyes:      General: No scleral icterus.      Conjunctiva/sclera: Conjunctivae normal.   Pulmonary:      Effort: Pulmonary effort is normal. No respiratory distress.   Abdominal:      General: Bowel sounds are normal. There is no distension.   Neurological:      General: No focal deficit present.      Mental Status: She is alert and oriented to person, place, and time.   Psychiatric:         Mood and Affect: Mood normal.         Behavior: Behavior normal.           Labs/imaging; reviewed  Assessment:       1. Diarrhea, unspecified type        Plan:   1. Suspect infectious most likely and improving clinically  2. Await stool testing, much of it has not resulted  3. Ok for anti-diarrheal medications  4. Discussed dietary recommendations

## 2020-11-25 NOTE — TELEPHONE ENCOUNTER
----- Message from Patrick Javed sent at 11/25/2020  8:03 AM CST -----  Pt is calling to speak with Maryellen veronica the problems she's having         Please contact pt [phone]

## 2020-11-25 NOTE — TELEPHONE ENCOUNTER
Pt thought she had c.diff, test came back negative. Other stool results still pending. Pt currently in apt with GI, Dr. Welsh.

## 2020-11-25 NOTE — LETTER
November 25, 2020      Becca Peters,   [address] [facility]  [place] [address]           [place] [facility]  [address], [address]  [facility] [address]  Phone: [phone]          Patient: Joyce Peterson   MR Number: [id number]   YOB: 1960   Date of Visit: 11/25/2020       Dear Dr. Becca Peters:    Thank you for referring Joyce Peterson to me for evaluation. Attached you will find relevant portions of my assessment and plan of care.    If you have questions, please do not hesitate to call me. I look forward to following Joyce Peterson along with you.    Sincerely,    Jordyn Welsh MD    Enclosure  CC:  No Recipients    If you would like to receive this communication electronically, please contact [email or web address] or [phone] to request more information on [employer] Link access.    For providers and/or their staff who would like to refer a patient to [facility], please contact us through our one-stop-shop provider referral line, [facility], at [phone].    If you feel you have received this communication in error or would no longer like to receive these types of communications, please e-mail [email or web address]

## 2020-11-25 NOTE — TELEPHONE ENCOUNTER
Spoke with patient.  Requested to be seen today by anyone.  Continues with diarrhea.  Scheduled with  at the [place] location for today at 9:30.   Delilah

## 2020-11-27 ENCOUNTER — PATIENT MESSAGE (OUTPATIENT)
Dept: INTERNAL MEDICINE | Facility: CLINIC | Age: 60
End: 2020-11-27

## 2020-11-27 LAB
BACTERIA STL CULT: ABNORMAL
BACTERIA STL CULT: ABNORMAL
O+P STL MICRO: NORMAL

## 2020-11-27 RX ORDER — AZITHROMYCIN 500 MG/1
500 TABLET, FILM COATED ORAL DAILY
Qty: 3 TABLET | Refills: 0 | Status: SHIPPED | OUTPATIENT
Start: 2020-11-27 | End: 2021-01-27

## 2020-11-30 ENCOUNTER — PATIENT MESSAGE (OUTPATIENT)
Dept: INTERNAL MEDICINE | Facility: CLINIC | Age: 60
End: 2020-11-30

## 2020-11-30 ENCOUNTER — TELEPHONE (OUTPATIENT)
Dept: ADMINISTRATIVE | Facility: OTHER | Age: 60
End: 2020-11-30

## 2020-11-30 DIAGNOSIS — E87.6 HYPOKALEMIA: Primary | ICD-10-CM

## 2020-11-30 DIAGNOSIS — A04.5 CAMPYLOBACTER DIARRHEA: ICD-10-CM

## 2020-12-01 ENCOUNTER — PATIENT MESSAGE (OUTPATIENT)
Dept: INTERNAL MEDICINE | Facility: CLINIC | Age: 60
End: 2020-12-01

## 2020-12-02 ENCOUNTER — PATIENT MESSAGE (OUTPATIENT)
Dept: INTERNAL MEDICINE | Facility: CLINIC | Age: 60
End: 2020-12-02

## 2020-12-03 ENCOUNTER — PATIENT MESSAGE (OUTPATIENT)
Dept: INTERNAL MEDICINE | Facility: CLINIC | Age: 60
End: 2020-12-03

## 2020-12-04 ENCOUNTER — PATIENT MESSAGE (OUTPATIENT)
Dept: INTERNAL MEDICINE | Facility: CLINIC | Age: 60
End: 2020-12-04

## 2020-12-07 ENCOUNTER — PATIENT MESSAGE (OUTPATIENT)
Dept: GASTROENTEROLOGY | Facility: CLINIC | Age: 60
End: 2020-12-07

## 2020-12-07 ENCOUNTER — PATIENT MESSAGE (OUTPATIENT)
Dept: INTERNAL MEDICINE | Facility: CLINIC | Age: 60
End: 2020-12-07

## 2020-12-07 NOTE — TELEPHONE ENCOUNTER
The result was released on 12/5. Her culture is now negative. If she is still having symptoms, would recommend she f/u with gastro.

## 2020-12-08 ENCOUNTER — TELEPHONE (OUTPATIENT)
Dept: ADMINISTRATIVE | Facility: OTHER | Age: 60
End: 2020-12-08

## 2020-12-08 ENCOUNTER — TELEPHONE (OUTPATIENT)
Dept: GASTROENTEROLOGY | Facility: CLINIC | Age: 60
End: 2020-12-08

## 2020-12-08 NOTE — TELEPHONE ENCOUNTER
----- Message from Antonieta Diaz sent at 12/8/2020  3:06 PM CST -----  Contact: pt  Please call pt at [phone]    Patient is returning staff call from today     Thank you

## 2020-12-08 NOTE — TELEPHONE ENCOUNTER
Mathew Mazariegos,  Dr. Moore must get permission from the sponsor.   I will email Dr. Moore to let him know.

## 2020-12-08 NOTE — TELEPHONE ENCOUNTER
Spoke with patient regarding an appointment.   Will get back with her once I have spoken with the research coordinator for the Cisapride study.   Delilah

## 2020-12-14 ENCOUNTER — SPECIALTY PHARMACY (OUTPATIENT)
Dept: PHARMACY | Facility: CLINIC | Age: 60
End: 2020-12-14

## 2020-12-14 RX ORDER — FLUCONAZOLE 100 MG/1
100 TABLET ORAL ONCE
Qty: 14 TABLET | Refills: 2 | Status: SHIPPED | OUTPATIENT
Start: 2020-12-14 | End: 2021-03-23

## 2020-12-14 NOTE — TELEPHONE ENCOUNTER
Specialty Pharmacy - Refill Coordination    Specialty Medication Orders Linked to Encounter      Most Recent Value   Medication #1  teriparatide (FORTEO) 20 mcg/dose - 600 mcg/2.4 mL PnIj (Order#[id number], Rx#[id number])   Medication #2  sarilumab (KEVZARA) 200 mg/1.14 mL Syrg (Order#[id number], Rx#[id number])          Refill Questions - Documented Responses      Most Recent Value   Relationship to patient of person spoken to?  Self   HIPAA/medical authority confirmed?  Yes   Any changes in contact preferences or allowed representatives?  No   Has the patient had any insurance changes?  No   Has the patient had any changes to specialty medication, dose, or instructions?  No   Has the patient started taking any new medications, herbals, or supplements?  No   Has the patient been diagnosed with any new medical conditions?  No   Does the patient have any new allergies to medications or foods?  No   Does the patient have any concerns about side effects?  No   Can the patient store medication/sharps container properly (at the correct temperature, away from children/pets, etc.)?  Yes   Can the patient call emergency services (911) in the event of an emergency?  Yes   Does the patient have any concerns or questions about taking or administering this medication as prescribed?  No   How many doses did the patient miss in the past 4 weeks or since the last fill?  0   How many doses does the patient have on hand?  3   How many days does the patient report on hand quantity will last?  3   Does the number of doses/days supply remaining match pharmacy expected amounts?  Yes   Does the patient feel that this medication is effective?  Yes   How will the patient receive the medication?  Mail   When does the patient need to receive the medication?  12/16/20   Shipping Address  Home   Address in [facility] confirmed and updated if neccessary?  Yes   Expected Copay ($)  65   Is the patient able to afford the medication copay?   Yes   Payment Method  CC on file   Days supply of Refill  28   Would patient like to speak to a pharmacist?  No   Do you want to trigger an intervention?  No   Do you want to trigger an additional referral task?  No   Refill activity completed?  Yes   Refill activity plan  Refill scheduled   Shipment/Pickup Date:  12/14/20          Current Outpatient Medications   Medication Sig    albuterol (ACCUNEB) 1.25 mg/3 mL Nebu Take 3 mLs (1.25 mg total) by nebulization every 6 (six) hours as needed. Rescue    albuterol (PROVENTIL HFA) 90 mcg/actuation inhaler Inhale 2 puffs into the lungs every 6 (six) hours as needed. Rescue    albuterol-ipratropium (DUO-NEB) 2.5 mg-0.5 mg/3 mL nebulizer solution Take 3 mLs by nebulization every 6 (six) hours as needed for Wheezing. Rescue    aspirin 81 mg Tab Take 81 mg by mouth every morning.     azelastine (ASTELIN) 137 mcg (0.1 %) nasal spray 1 spray (137 mcg total) by Nasal route 2 (two) times daily.    azithromycin (ZITHROMAX) 500 MG tablet Take 1 tablet (500 mg total) by mouth once daily.    benzonatate (TESSALON) 100 MG capsule Take 1 capsule by mouth three times a day    budesonide-formoterol 160-4.5 mcg (SYMBICORT) 160-4.5 mcg/actuation HFAA Inhale 2 puffs into the lungs every 12 (twelve) hours.    calcium citrate-vitamin D (CITRACAL + D) 315-200 mg-unit per tablet Take 1 tablet by mouth 2 (two) times daily.     ciclopirox (PENLAC) 8 % Soln Apply daily to affected nail. Must remove and restart weekly    cyclobenzaprine (FLEXERIL) 5 MG tablet     diclofenac sodium (VOLTAREN) 1 % Gel APPLY 2 GRAMS TOPICALLY 4 (FOUR) TIMES DAILY AS NEEDED.    DULoxetine (CYMBALTA) 20 MG capsule Take 1 capsule (20 mg total) by mouth once daily.    erythromycin (ROMYCIN) ophthalmic ointment Apply to affected external eyelid tissue two times per day as directed for fourteen days    fluconazole (DIFLUCAN) 100 MG tablet     fluticasone propionate (FLONASE) 50 mcg/actuation nasal spray 2  sprays (100 mcg total) by Each Nostril route once daily.    INV PROPULSID 10 MG Take 1 tablets (20 mg) by mouth 4 (four) times daily. FOR INVESTIGATIONAL USE ONLY. Protocol [facility]USA-[id number]    leflunomide (ARAVA) 20 MG Tab Take 1 tablet (20 mg total) by mouth once daily.    methenamine (HIPREX) 1 gram Tab Take 1 tablet (1 g total) by mouth 2 (two) times daily.    methenamine (HIPREX) 1 gram Tab Take 1 tablet (1 g total) by mouth 2 (two) times daily.    mirabegron (MYRBETRIQ) 25 mg Tb24 ER tablet Take 1 tablet (25 mg total) by mouth once daily.    montelukast (SINGULAIR) 10 mg tablet Take 1 tablet (10 mg total) by mouth nightly.    naproxen (NAPROSYN) 500 MG tablet TAKE 1 TABLET BY MOUTH TWICE A DAY AS NEEDED    omeprazole (PRILOSEC) 40 MG capsule Take 1 capsule (40 mg total) by mouth every morning.    omeprazole-sodium bicarbonate (ZEGERID) 40-1.1 mg-gram per capsule Take 1 capsule by mouth every morning.    potassium chloride SA (K-DUR,KLOR-CON) 20 MEQ tablet Take 1 tablet (20 mEq total) by mouth once daily.    POTASSIUM ORAL Take by mouth once daily.    predniSONE (DELTASONE) 5 MG tablet Take 1 tablet (5 mg total) by mouth once daily.    PREMARIN 0.625 mg tablet Take 0.625 mg by mouth once daily.    PREMARIN vaginal cream PLACE 0.5 GRAM VAGINALLY 3 (THREE) TIMES A WEEK.    progesterone (PROMETRIUM) 100 MG capsule Take 100 mg by mouth every morning. 1 Capsule Oral Every day, morning    promethazine (PHENERGAN) 25 MG tablet Take 1 tablet (25 mg total) by mouth every 6 (six) hours as needed for Nausea.    RESTASIS 0.05 % ophthalmic emulsion PLACE 0.4 MLS (1 DROP TOTAL) INTO BOTH EYES 2 (TWO) TIMES DAILY.    rosuvastatin (CRESTOR) 20 MG tablet Take 1 tablet (20 mg total) by mouth every evening.    sarilumab (KEVZARA) 200 mg/1.14 mL Syrg Inject 1.14 mLs (200 mg total) into the skin every 14 (fourteen) days.    sucralfate (CARAFATE) 1 gram tablet Take 1 tablet (1 g total) by mouth 4 (four) times daily.  "   syringe with needle (TUBERCULIN SYRINGE) 1 mL 27 x 1/2" Syrg To administer methotrexate 7.5mg sc once a week    teriparatide (FORTEO) 20 mcg/dose - 600 mcg/2.4 mL PnIj Inject 0.08 mLs (20 mcg total) into the skin once daily.    traMADol (ULTRAM) 50 mg tablet Take 1 tablet (50 mg total) by mouth every 24 hours as needed for Pain.    triamcinolone acetonide 0.1% (KENALOG) 0.1 % ointment aaa qhs , then vaseline and warm towel for flares. Not more than 2 weeks straight in same location. Avoid face and groin    trospium (SANCTURA) 20 mg Tab tablet TAKE 1 TABLET BY MOUTH TWICE A DAY. DUE FOR FOLLOW UP IN JULY    XIIDRA 5 % Dpet INSTILL ONE DROPP INTO BOTH EYES TWICE A DAY   Last reviewed on 12/14/2020  9:25 AM by Andrez Brambila    Review of patient's allergies indicates:   Allergen Reactions    Actemra [tocilizumab] Other (See Comments)     Severe dizziness    Codeine Nausea And Vomiting    Gold au 198 Hives and Rash    Hydroxychloroquine Other (See Comments)     Can't remember the reaction      Iodinated contrast media Other (See Comments)     Other reaction(s): BURNING ALL OVER    Iodine Other (See Comments)     Other reaction(s): BURNING ALL OVER - Iodine dye - Not topical    Sulfa (sulfonamide antibiotics) Other (See Comments)     Can't remember the reaction    Zofran [ondansetron hcl (pf)] Nausea And Vomiting     Pt reports that last time she received zofran she started vomiting again    Methotrexate analogues Nausea Only    Pneumovax 23 [pneumococcal 23-argenis ps vaccine] Other (See Comments)     "sick"    Last reviewed on  12/14/2020 9:25 AM by Andrez Brambila      Tasks added this encounter   1/6/2021 - Refill Call (Auto Added)   Tasks due within next 3 months   1/16/2021 - Clinical - Follow Up Assesement (180 day)     Andrez Brambila  [facility]  [address] [place] [address]  Phone: [phone]  Fax: [phone]      "

## 2020-12-15 ENCOUNTER — PATIENT OUTREACH (OUTPATIENT)
Dept: ADMINISTRATIVE | Facility: OTHER | Age: 60
End: 2020-12-15

## 2020-12-15 NOTE — PROGRESS NOTES
LINKS immunization registry not responding  Care Everywhere updated  Health Maintenance updated  Chart reviewed for overdue Proactive [facility] Encounters ([facility]) health maintenance testing (CRS, Breast Ca, Diabetic Eye Exam)   Orders entered:N/A

## 2020-12-16 ENCOUNTER — HOSPITAL ENCOUNTER (OUTPATIENT)
Dept: CARDIOLOGY | Facility: CLINIC | Age: 60
Discharge: HOME | End: 2020-12-16
Payer: MEDICARE

## 2020-12-16 ENCOUNTER — RESEARCH ENCOUNTER (OUTPATIENT)
Dept: GASTROENTEROLOGY | Facility: CLINIC | Age: 60
End: 2020-12-16

## 2020-12-16 ENCOUNTER — OFFICE VISIT (OUTPATIENT)
Dept: GASTROENTEROLOGY | Facility: CLINIC | Age: 60
End: 2020-12-16
Payer: MEDICARE

## 2020-12-16 VITALS — HEIGHT: 61 IN | WEIGHT: 156.06 LBS | BODY MASS INDEX: 29.47 KG/M2

## 2020-12-16 DIAGNOSIS — K31.84 GASTROPARESIS: ICD-10-CM

## 2020-12-16 DIAGNOSIS — K31.84 GASTROPARESIS: Primary | ICD-10-CM

## 2020-12-16 DIAGNOSIS — A04.9 BACTERIAL COLITIS: ICD-10-CM

## 2020-12-16 DIAGNOSIS — Z00.6 RESEARCH STUDY PATIENT: ICD-10-CM

## 2020-12-16 DIAGNOSIS — K21.9 GASTROESOPHAGEAL REFLUX DISEASE WITHOUT ESOPHAGITIS: ICD-10-CM

## 2020-12-16 PROCEDURE — 99214 PR OFFICE/OUTPT VISIT, EST, LEVL IV, 30-39 MIN: ICD-10-PCS | Mod: S$GLB,,, | Performed by: INTERNAL MEDICINE

## 2020-12-16 PROCEDURE — 99214 OFFICE O/P EST MOD 30 MIN: CPT | Mod: S$GLB,,, | Performed by: INTERNAL MEDICINE

## 2020-12-16 PROCEDURE — 99999 PR PBB SHADOW E&M-EST. PATIENT-LVL IV: CPT | Mod: PBBFAC,,, | Performed by: INTERNAL MEDICINE

## 2020-12-16 PROCEDURE — 99999 PR PBB SHADOW E&M-EST. PATIENT-LVL IV: ICD-10-PCS | Mod: PBBFAC,,, | Performed by: INTERNAL MEDICINE

## 2020-12-16 PROCEDURE — 93010 EKG 12-LEAD: ICD-10-PCS | Mod: S$GLB,,, | Performed by: INTERNAL MEDICINE

## 2020-12-16 PROCEDURE — 93010 ELECTROCARDIOGRAM REPORT: CPT | Mod: S$GLB,,, | Performed by: INTERNAL MEDICINE

## 2020-12-16 PROCEDURE — 93005 EKG 12-LEAD: ICD-10-PCS | Mod: S$GLB,,, | Performed by: INTERNAL MEDICINE

## 2020-12-16 PROCEDURE — 3008F PR BODY MASS INDEX (BMI) DOCUMENTED: ICD-10-PCS | Mod: CPTII,S$GLB,, | Performed by: INTERNAL MEDICINE

## 2020-12-16 PROCEDURE — 3008F BODY MASS INDEX DOCD: CPT | Mod: CPTII,S$GLB,, | Performed by: INTERNAL MEDICINE

## 2020-12-16 PROCEDURE — 93005 ELECTROCARDIOGRAM TRACING: CPT | Mod: S$GLB,,, | Performed by: INTERNAL MEDICINE

## 2020-12-16 NOTE — PROGRESS NOTES
Subjective:       Patient ID: Joyce Peterson is a 60 y.o. female.    Chief Complaint: Follow-up (Gastroparesis)    HPI     Here for a follow-up visit.  Patient has idiopathic gastroparesis.  She failed other modes of treatment and is on the compassionate trial of cisapride.  She has done very well with cisapride.  Since her last visit she had an episode of bacterial colitis.  This presented in early November with severe watery diarrhea.  Ultimately the diarrhea lasted about 2 or 3 weeks.  Bacterial cultures came back positive for Campylobacter.  This was treated with a Z-Aj.  The diarrhea finally resolved.    She is back to her baseline now in terms of her bowel movements.  Previously she had 2 or 3 soft loose bowel movements per day.  And she is at that point now.  But she has some difficulty with digesting.  She feels some uneasiness after eating.  Increased gas and distention and increased bowel sounds.  This has been ever since the colitis.  She briefly stop taking cisapride while she was having the infection.  Instantly she noticed severe reflux.  She is back on the cisapride now.  History.    Social history  Using both probiotics and Activia    Review of Systems   Constitutional: Negative for activity change, appetite change, chills, diaphoresis, fatigue, fever and unexpected weight change.   HENT: Negative for nasal congestion, ear pain, mouth sores, nosebleeds, postnasal drip, rhinorrhea, sinus pressure/congestion, sore throat, trouble swallowing and voice change.    Eyes: Negative for pain.   Respiratory: Negative for cough, shortness of breath and wheezing.    Cardiovascular: Negative for chest pain, palpitations and leg swelling.   Genitourinary: Negative for difficulty urinating, dysuria, flank pain, hematuria and menstrual problem.   Musculoskeletal: Positive for arthralgias and joint swelling. Negative for back pain, gait problem, myalgias and neck pain.   Integumentary:  Negative for rash.    Neurological: Negative for dizziness, tremors, syncope, numbness and headaches.   Hematological: Negative for adenopathy. Does not bruise/bleed easily.   Psychiatric/Behavioral: Negative for agitation, behavioral problems, confusion, decreased concentration and dysphoric mood. The patient is not nervous/anxious.          Objective:      Physical Exam  Constitutional:       General: She is not in acute distress.     Appearance: She is well-developed.   HENT:      Head: Normocephalic and atraumatic.      Right Ear: External ear normal.      Left Ear: External ear normal.      Nose: Nose normal.      Mouth/Throat:      Pharynx: No oropharyngeal exudate.   Eyes:      General: No scleral icterus.     Conjunctiva/sclera: Conjunctivae normal.      Pupils: Pupils are equal, round, and reactive to light.   Neck:      Musculoskeletal: Normal range of motion and neck supple.      Thyroid: No thyromegaly.   Cardiovascular:      Rate and Rhythm: Normal rate and regular rhythm.      Heart sounds: Normal heart sounds. No murmur. No gallop.    Pulmonary:      Effort: Pulmonary effort is normal.      Breath sounds: Normal breath sounds. No wheezing or rales.   Abdominal:      General: Abdomen is flat. Bowel sounds are increased. There is no distension.      Palpations: Abdomen is soft. There is no hepatomegaly or mass.      Tenderness: There is no abdominal tenderness.   Musculoskeletal: Normal range of motion.         General: No tenderness.   Lymphadenopathy:      Cervical: No cervical adenopathy.   Skin:     General: Skin is warm and dry.      Findings: No rash.   Neurological:      Mental Status: She is alert and oriented to person, place, and time.      Cranial Nerves: No cranial nerve deficit.   Psychiatric:         Behavior: Behavior normal.         Thought Content: Thought content normal.         Judgment: Judgment normal.         Assessment:       1. Gastroparesis    2. Gastroesophageal reflux disease without esophagitis     3. Research study patient    4. Bacterial colitis        Plan:         assessment .  She definitely has derived benefit from cisapride for her gastroparesis of which the main symptom is refractory reflux.  She desires to be continued on this medicine.  I will fill out the study forms check her labs and check her EKG.  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  It was a prolonged infection probably because of her immunocompromised status.  Ultimately I think these symptoms will return to baseline.  She should continue with the probiotics and Activia for now.    30 min appointment greater half time face-to-face counseling

## 2020-12-22 DIAGNOSIS — J45.20 CHRONIC ASTHMA, MILD INTERMITTENT, UNCOMPLICATED: ICD-10-CM

## 2020-12-23 RX ORDER — BUDESONIDE AND FORMOTEROL FUMARATE DIHYDRATE 160; 4.5 UG/1; UG/1
2 AEROSOL RESPIRATORY (INHALATION) EVERY 12 HOURS
Qty: 3 INHALER | Refills: 3 | Status: SHIPPED | OUTPATIENT
Start: 2020-12-23 | End: 2022-03-10

## 2021-01-06 ENCOUNTER — PATIENT MESSAGE (OUTPATIENT)
Dept: RHEUMATOLOGY | Facility: CLINIC | Age: 61
End: 2021-01-06

## 2021-01-07 DIAGNOSIS — M84.352A STRESS FRACTURE OF NECK OF LEFT FEMUR: ICD-10-CM

## 2021-01-07 DIAGNOSIS — T38.0X5A STEROID-INDUCED OSTEOPOROSIS: ICD-10-CM

## 2021-01-07 DIAGNOSIS — M81.0 POST-MENOPAUSAL OSTEOPOROSIS: ICD-10-CM

## 2021-01-07 DIAGNOSIS — M81.8 STEROID-INDUCED OSTEOPOROSIS: ICD-10-CM

## 2021-01-07 DIAGNOSIS — M06.9 RHEUMATOID ARTHRITIS, INVOLVING UNSPECIFIED SITE, UNSPECIFIED WHETHER RHEUMATOID FACTOR PRESENT: ICD-10-CM

## 2021-01-08 RX ORDER — TERIPARATIDE 250 UG/ML
20 INJECTION, SOLUTION SUBCUTANEOUS DAILY
Qty: 2.4 ML | Refills: 11 | Status: SHIPPED | OUTPATIENT
Start: 2021-01-08 | End: 2021-10-28 | Stop reason: SDUPTHER

## 2021-01-09 ENCOUNTER — TELEPHONE (OUTPATIENT)
Dept: PHARMACY | Facility: CLINIC | Age: 61
End: 2021-01-09

## 2021-01-11 ENCOUNTER — SPECIALTY PHARMACY (OUTPATIENT)
Dept: PHARMACY | Facility: CLINIC | Age: 61
End: 2021-01-11

## 2021-01-15 ENCOUNTER — PATIENT MESSAGE (OUTPATIENT)
Dept: RHEUMATOLOGY | Facility: CLINIC | Age: 61
End: 2021-01-15

## 2021-01-21 ENCOUNTER — PATIENT MESSAGE (OUTPATIENT)
Dept: UROLOGY | Facility: CLINIC | Age: 61
End: 2021-01-21

## 2021-01-25 ENCOUNTER — PATIENT MESSAGE (OUTPATIENT)
Dept: UROLOGY | Facility: CLINIC | Age: 61
End: 2021-01-25

## 2021-01-25 ENCOUNTER — PATIENT MESSAGE (OUTPATIENT)
Dept: ENDOCRINOLOGY | Facility: CLINIC | Age: 61
End: 2021-01-25

## 2021-01-25 ENCOUNTER — PATIENT OUTREACH (OUTPATIENT)
Dept: ADMINISTRATIVE | Facility: OTHER | Age: 61
End: 2021-01-25

## 2021-01-25 ENCOUNTER — TELEPHONE (OUTPATIENT)
Dept: RHEUMATOLOGY | Facility: CLINIC | Age: 61
End: 2021-01-25

## 2021-01-25 DIAGNOSIS — R74.01 ELEVATED LIVER TRANSAMINASE LEVEL: Primary | ICD-10-CM

## 2021-01-25 DIAGNOSIS — E05.90 SUBCLINICAL HYPERTHYROIDISM: Primary | ICD-10-CM

## 2021-01-27 ENCOUNTER — PATIENT MESSAGE (OUTPATIENT)
Dept: RHEUMATOLOGY | Facility: CLINIC | Age: 61
End: 2021-01-27

## 2021-01-27 ENCOUNTER — OFFICE VISIT (OUTPATIENT)
Dept: RHEUMATOLOGY | Facility: CLINIC | Age: 61
End: 2021-01-27
Payer: MEDICARE

## 2021-01-27 ENCOUNTER — OFFICE VISIT (OUTPATIENT)
Dept: ORTHOPEDICS | Facility: CLINIC | Age: 61
End: 2021-01-27
Payer: MEDICARE

## 2021-01-27 VITALS
SYSTOLIC BLOOD PRESSURE: 115 MMHG | WEIGHT: 152.13 LBS | HEIGHT: 61 IN | HEART RATE: 101 BPM | DIASTOLIC BLOOD PRESSURE: 79 MMHG | BODY MASS INDEX: 28.72 KG/M2

## 2021-01-27 DIAGNOSIS — M66.872 NONTRAUMATIC RUPTURE OF POSTERIOR TIBIAL TENDON, LEFT: Primary | ICD-10-CM

## 2021-01-27 DIAGNOSIS — R74.01 TRANSAMINASEMIA: Primary | ICD-10-CM

## 2021-01-27 DIAGNOSIS — M21.42 PES PLANOVALGUS, ACQUIRED, LEFT: ICD-10-CM

## 2021-01-27 DIAGNOSIS — M06.9 RHEUMATOID ARTHRITIS INVOLVING MULTIPLE SITES, UNSPECIFIED WHETHER RHEUMATOID FACTOR PRESENT: Chronic | ICD-10-CM

## 2021-01-27 DIAGNOSIS — M06.9 RHEUMATOID ARTHRITIS: ICD-10-CM

## 2021-01-27 DIAGNOSIS — M19.079 ARTHRITIS OF FOOT: ICD-10-CM

## 2021-01-27 PROCEDURE — 99212 PR OFFICE/OUTPT VISIT, EST, LEVL II, 10-19 MIN: ICD-10-PCS | Mod: S$GLB,,, | Performed by: ORTHOPAEDIC SURGERY

## 2021-01-27 PROCEDURE — 1125F AMNT PAIN NOTED PAIN PRSNT: CPT | Mod: S$GLB,,, | Performed by: INTERNAL MEDICINE

## 2021-01-27 PROCEDURE — 99999 PR PBB SHADOW E&M-EST. PATIENT-LVL I: CPT | Mod: PBBFAC,,, | Performed by: ORTHOPAEDIC SURGERY

## 2021-01-27 PROCEDURE — 99214 OFFICE O/P EST MOD 30 MIN: CPT | Mod: S$GLB,,, | Performed by: INTERNAL MEDICINE

## 2021-01-27 PROCEDURE — 99999 PR PBB SHADOW E&M-EST. PATIENT-LVL III: ICD-10-PCS | Mod: PBBFAC,,, | Performed by: INTERNAL MEDICINE

## 2021-01-27 PROCEDURE — 1125F PR PAIN SEVERITY QUANTIFIED, PAIN PRESENT: ICD-10-PCS | Mod: S$GLB,,, | Performed by: INTERNAL MEDICINE

## 2021-01-27 PROCEDURE — 99212 OFFICE O/P EST SF 10 MIN: CPT | Mod: S$GLB,,, | Performed by: ORTHOPAEDIC SURGERY

## 2021-01-27 PROCEDURE — 99999 PR PBB SHADOW E&M-EST. PATIENT-LVL I: ICD-10-PCS | Mod: PBBFAC,,, | Performed by: ORTHOPAEDIC SURGERY

## 2021-01-27 PROCEDURE — 3008F PR BODY MASS INDEX (BMI) DOCUMENTED: ICD-10-PCS | Mod: CPTII,S$GLB,, | Performed by: INTERNAL MEDICINE

## 2021-01-27 PROCEDURE — 3008F BODY MASS INDEX DOCD: CPT | Mod: CPTII,S$GLB,, | Performed by: INTERNAL MEDICINE

## 2021-01-27 PROCEDURE — 99214 PR OFFICE/OUTPT VISIT, EST, LEVL IV, 30-39 MIN: ICD-10-PCS | Mod: S$GLB,,, | Performed by: INTERNAL MEDICINE

## 2021-01-27 PROCEDURE — 99999 PR PBB SHADOW E&M-EST. PATIENT-LVL III: CPT | Mod: PBBFAC,,, | Performed by: INTERNAL MEDICINE

## 2021-01-27 RX ORDER — DICLOFENAC SODIUM 10 MG/G
GEL TOPICAL
Qty: 3 TUBE | Refills: 2 | Status: SHIPPED | OUTPATIENT
Start: 2021-01-27 | End: 2021-08-23 | Stop reason: SDUPTHER

## 2021-01-27 RX ORDER — NAPROXEN 500 MG/1
500 TABLET ORAL 2 TIMES DAILY PRN
Qty: 180 TABLET | Refills: 0 | Status: SHIPPED | OUTPATIENT
Start: 2021-01-27 | End: 2021-05-18

## 2021-01-27 RX ORDER — LEFLUNOMIDE 20 MG/1
20 TABLET ORAL DAILY
Qty: 90 TABLET | Refills: 0 | Status: SHIPPED | OUTPATIENT
Start: 2021-01-27 | End: 2021-02-05

## 2021-01-27 RX ORDER — DULOXETIN HYDROCHLORIDE 30 MG/1
30 CAPSULE, DELAYED RELEASE ORAL NIGHTLY
Qty: 30 CAPSULE | Refills: 2 | Status: SHIPPED | OUTPATIENT
Start: 2021-01-27 | End: 2021-03-10

## 2021-01-27 RX ORDER — PREDNISONE 5 MG/1
5 TABLET ORAL DAILY
Qty: 90 TABLET | Refills: 1 | Status: SHIPPED | OUTPATIENT
Start: 2021-01-27 | End: 2021-05-25 | Stop reason: SDUPTHER

## 2021-01-27 ASSESSMENT — ROUTINE ASSESSMENT OF PATIENT INDEX DATA (RAPID3)
WHEN YOU AWAKENED IN THE MORNING OVER THE LAST WEEK, PLEASE INDICATE THE AMOUNT OF TIME IT TAKES UNTIL YOU ARE AS LIMBER AS YOU WILL BE FOR THE DAY: 24
PSYCHOLOGICAL DISTRESS SCORE: 2.2
MDHAQ FUNCTION SCORE: 1.2
PAIN SCORE: 4
FATIGUE SCORE: 4
TOTAL RAPID3 SCORE: 4
AM STIFFNESS SCORE: 1, YES
PATIENT GLOBAL ASSESSMENT SCORE: 4

## 2021-02-01 ENCOUNTER — PATIENT MESSAGE (OUTPATIENT)
Dept: RHEUMATOLOGY | Facility: CLINIC | Age: 61
End: 2021-02-01

## 2021-02-03 ENCOUNTER — TELEPHONE (OUTPATIENT)
Dept: ENDOCRINOLOGY | Facility: CLINIC | Age: 61
End: 2021-02-03

## 2021-02-03 ENCOUNTER — SPECIALTY PHARMACY (OUTPATIENT)
Dept: PHARMACY | Facility: CLINIC | Age: 61
End: 2021-02-03

## 2021-02-03 DIAGNOSIS — E05.90 SUBCLINICAL HYPERTHYROIDISM: Primary | ICD-10-CM

## 2021-02-03 DIAGNOSIS — T38.0X5A STEROID-INDUCED OSTEOPOROSIS: ICD-10-CM

## 2021-02-03 DIAGNOSIS — M81.8 STEROID-INDUCED OSTEOPOROSIS: ICD-10-CM

## 2021-02-03 DIAGNOSIS — E04.1 THYROID NODULE: ICD-10-CM

## 2021-02-04 ENCOUNTER — TELEPHONE (OUTPATIENT)
Dept: ENDOCRINOLOGY | Facility: CLINIC | Age: 61
End: 2021-02-04

## 2021-02-07 ENCOUNTER — PATIENT MESSAGE (OUTPATIENT)
Dept: RHEUMATOLOGY | Facility: CLINIC | Age: 61
End: 2021-02-07

## 2021-02-08 ENCOUNTER — TELEPHONE (OUTPATIENT)
Dept: ADMINISTRATIVE | Facility: CLINIC | Age: 61
End: 2021-02-08

## 2021-02-08 ENCOUNTER — PATIENT MESSAGE (OUTPATIENT)
Dept: INTERNAL MEDICINE | Facility: CLINIC | Age: 61
End: 2021-02-08

## 2021-02-08 ENCOUNTER — PATIENT MESSAGE (OUTPATIENT)
Dept: RHEUMATOLOGY | Facility: CLINIC | Age: 61
End: 2021-02-08

## 2021-02-08 ENCOUNTER — TELEPHONE (OUTPATIENT)
Dept: RHEUMATOLOGY | Facility: HOSPITAL | Age: 61
End: 2021-02-08

## 2021-02-08 ENCOUNTER — TELEPHONE (OUTPATIENT)
Dept: RHEUMATOLOGY | Facility: CLINIC | Age: 61
End: 2021-02-08

## 2021-02-08 DIAGNOSIS — U07.1 COVID-19: Primary | ICD-10-CM

## 2021-02-09 ENCOUNTER — TELEPHONE (OUTPATIENT)
Dept: RHEUMATOLOGY | Facility: CLINIC | Age: 61
End: 2021-02-09

## 2021-02-09 ENCOUNTER — INFUSION (OUTPATIENT)
Dept: INFECTIOUS DISEASES | Facility: HOSPITAL | Age: 61
End: 2021-02-09
Attending: INTERNAL MEDICINE
Payer: MEDICARE

## 2021-02-09 ENCOUNTER — TELEPHONE (OUTPATIENT)
Dept: ADMINISTRATIVE | Facility: CLINIC | Age: 61
End: 2021-02-09

## 2021-02-09 VITALS
TEMPERATURE: 98 F | WEIGHT: 150 LBS | RESPIRATION RATE: 18 BRPM | HEIGHT: 61 IN | DIASTOLIC BLOOD PRESSURE: 68 MMHG | BODY MASS INDEX: 28.32 KG/M2 | SYSTOLIC BLOOD PRESSURE: 117 MMHG | HEART RATE: 89 BPM | OXYGEN SATURATION: 96 %

## 2021-02-09 DIAGNOSIS — U07.1 COVID-19: ICD-10-CM

## 2021-02-09 PROCEDURE — M0239 BAMLANIVIMAB-XXXX INFUSION: HCPCS | Performed by: INTERNAL MEDICINE

## 2021-02-09 PROCEDURE — 25000003 PHARM REV CODE 250: Performed by: INTERNAL MEDICINE

## 2021-02-09 PROCEDURE — 63600175 PHARM REV CODE 636 W HCPCS: Performed by: INTERNAL MEDICINE

## 2021-02-09 RX ORDER — DIPHENHYDRAMINE HYDROCHLORIDE 50 MG/ML
25 INJECTION INTRAMUSCULAR; INTRAVENOUS ONCE AS NEEDED
Status: DISCONTINUED | OUTPATIENT
Start: 2021-02-09 | End: 2022-02-01

## 2021-02-09 RX ORDER — ONDANSETRON 4 MG/1
4 TABLET, ORALLY DISINTEGRATING ORAL ONCE AS NEEDED
Status: DISCONTINUED | OUTPATIENT
Start: 2021-02-09 | End: 2022-02-01

## 2021-02-09 RX ORDER — EPINEPHRINE 0.1 MG/ML
0.3 INJECTION INTRAVENOUS
Status: DISCONTINUED | OUTPATIENT
Start: 2021-02-09 | End: 2022-02-01

## 2021-02-09 RX ORDER — ALBUTEROL SULFATE 90 UG/1
2 AEROSOL, METERED RESPIRATORY (INHALATION)
Status: DISCONTINUED | OUTPATIENT
Start: 2021-02-09 | End: 2022-02-01

## 2021-02-09 RX ORDER — ACETAMINOPHEN 325 MG/1
650 TABLET ORAL ONCE AS NEEDED
Status: DISCONTINUED | OUTPATIENT
Start: 2021-02-09 | End: 2021-12-08

## 2021-02-09 RX ORDER — SODIUM CHLORIDE 0.9 % (FLUSH) 0.9 %
10 SYRINGE (ML) INJECTION
Status: DISCONTINUED | OUTPATIENT
Start: 2021-02-09 | End: 2022-02-01

## 2021-02-09 RX ADMIN — SODIUM CHLORIDE 700 MG: 9 INJECTION, SOLUTION INTRAVENOUS at 09:02

## 2021-02-10 ENCOUNTER — TELEPHONE (OUTPATIENT)
Dept: HEPATOLOGY | Facility: CLINIC | Age: 61
End: 2021-02-10

## 2021-02-11 ENCOUNTER — TELEPHONE (OUTPATIENT)
Dept: RHEUMATOLOGY | Facility: CLINIC | Age: 61
End: 2021-02-11

## 2021-02-12 ENCOUNTER — PATIENT MESSAGE (OUTPATIENT)
Dept: INTERNAL MEDICINE | Facility: CLINIC | Age: 61
End: 2021-02-12

## 2021-02-12 RX ORDER — AZITHROMYCIN 250 MG/1
TABLET, FILM COATED ORAL
Qty: 6 TABLET | Refills: 0 | Status: SHIPPED | OUTPATIENT
Start: 2021-02-12 | End: 2021-02-17

## 2021-02-19 ENCOUNTER — TELEPHONE (OUTPATIENT)
Dept: HEPATOLOGY | Facility: CLINIC | Age: 61
End: 2021-02-19

## 2021-02-23 ENCOUNTER — PATIENT MESSAGE (OUTPATIENT)
Dept: RHEUMATOLOGY | Facility: CLINIC | Age: 61
End: 2021-02-23

## 2021-02-24 ENCOUNTER — TELEPHONE (OUTPATIENT)
Dept: RHEUMATOLOGY | Facility: CLINIC | Age: 61
End: 2021-02-24

## 2021-02-24 ENCOUNTER — PATIENT MESSAGE (OUTPATIENT)
Dept: RHEUMATOLOGY | Facility: CLINIC | Age: 61
End: 2021-02-24

## 2021-02-24 DIAGNOSIS — R74.8 ALKALINE PHOSPHATASE ELEVATION: Primary | ICD-10-CM

## 2021-03-01 ENCOUNTER — PATIENT MESSAGE (OUTPATIENT)
Dept: SPORTS MEDICINE | Facility: CLINIC | Age: 61
End: 2021-03-01

## 2021-03-01 ENCOUNTER — PATIENT OUTREACH (OUTPATIENT)
Dept: ADMINISTRATIVE | Facility: OTHER | Age: 61
End: 2021-03-01

## 2021-03-03 ENCOUNTER — OFFICE VISIT (OUTPATIENT)
Dept: OPTOMETRY | Facility: CLINIC | Age: 61
End: 2021-03-03

## 2021-03-03 ENCOUNTER — OFFICE VISIT (OUTPATIENT)
Dept: OPTOMETRY | Facility: CLINIC | Age: 61
End: 2021-03-03
Payer: MEDICARE

## 2021-03-03 ENCOUNTER — PATIENT MESSAGE (OUTPATIENT)
Dept: PHARMACY | Facility: CLINIC | Age: 61
End: 2021-03-03

## 2021-03-03 DIAGNOSIS — Z46.0 ENCOUNTER FOR FITTING OR ADJUSTMENT OF SPECTACLES OR CONTACT LENSES: Primary | ICD-10-CM

## 2021-03-03 DIAGNOSIS — H25.13 NUCLEAR SCLEROSIS OF BOTH EYES: ICD-10-CM

## 2021-03-03 DIAGNOSIS — H04.123 BILATERAL DRY EYES: Primary | ICD-10-CM

## 2021-03-03 DIAGNOSIS — H52.203 HYPEROPIA OF BOTH EYES WITH ASTIGMATISM: ICD-10-CM

## 2021-03-03 DIAGNOSIS — H53.001 AMBLYOPIA EX ANOPSIA OF RIGHT EYE: ICD-10-CM

## 2021-03-03 DIAGNOSIS — H52.03 HYPEROPIA OF BOTH EYES WITH ASTIGMATISM: ICD-10-CM

## 2021-03-03 DIAGNOSIS — H52.4 PRESBYOPIA: ICD-10-CM

## 2021-03-03 DIAGNOSIS — Z13.5 SCREENING FOR EYE CONDITION: ICD-10-CM

## 2021-03-03 PROCEDURE — 92014 COMPRE OPH EXAM EST PT 1/>: CPT | Mod: S$GLB,,, | Performed by: OPTOMETRIST

## 2021-03-03 PROCEDURE — 92015 PR REFRACTION: ICD-10-PCS | Mod: S$GLB,,, | Performed by: OPTOMETRIST

## 2021-03-03 PROCEDURE — 99999 PR PBB SHADOW E&M-EST. PATIENT-LVL II: ICD-10-PCS | Mod: PBBFAC,,, | Performed by: OPTOMETRIST

## 2021-03-03 PROCEDURE — 99499 NO LOS: ICD-10-PCS | Mod: S$GLB,,, | Performed by: OPTOMETRIST

## 2021-03-03 PROCEDURE — 92499 PR CONTACT LENS F/U LEV 1: ICD-10-PCS | Mod: S$GLB,,, | Performed by: OPTOMETRIST

## 2021-03-03 PROCEDURE — 99999 PR PBB SHADOW E&M-EST. PATIENT-LVL II: CPT | Mod: PBBFAC,,, | Performed by: OPTOMETRIST

## 2021-03-03 PROCEDURE — 92015 DETERMINE REFRACTIVE STATE: CPT | Mod: S$GLB,,, | Performed by: OPTOMETRIST

## 2021-03-03 PROCEDURE — 92014 PR EYE EXAM, EST PATIENT,COMPREHESV: ICD-10-PCS | Mod: S$GLB,,, | Performed by: OPTOMETRIST

## 2021-03-03 PROCEDURE — 92499 UNLISTED OPH SVC/PROCEDURE: CPT | Mod: S$GLB,,, | Performed by: OPTOMETRIST

## 2021-03-03 PROCEDURE — 99499 UNLISTED E&M SERVICE: CPT | Mod: S$GLB,,, | Performed by: OPTOMETRIST

## 2021-03-05 ENCOUNTER — PATIENT MESSAGE (OUTPATIENT)
Dept: OPTOMETRY | Facility: CLINIC | Age: 61
End: 2021-03-05

## 2021-03-08 ENCOUNTER — SPECIALTY PHARMACY (OUTPATIENT)
Dept: PHARMACY | Facility: CLINIC | Age: 61
End: 2021-03-08

## 2021-03-09 ENCOUNTER — TELEPHONE (OUTPATIENT)
Dept: PAIN MEDICINE | Facility: CLINIC | Age: 61
End: 2021-03-09

## 2021-03-09 ENCOUNTER — TELEPHONE (OUTPATIENT)
Dept: ENDOCRINOLOGY | Facility: CLINIC | Age: 61
End: 2021-03-09

## 2021-03-09 ENCOUNTER — TELEPHONE (OUTPATIENT)
Dept: ORTHOPEDICS | Facility: CLINIC | Age: 61
End: 2021-03-09

## 2021-03-09 DIAGNOSIS — M81.0 OSTEOPOROSIS, UNSPECIFIED OSTEOPOROSIS TYPE, UNSPECIFIED PATHOLOGICAL FRACTURE PRESENCE: Primary | ICD-10-CM

## 2021-03-10 ENCOUNTER — HOSPITAL ENCOUNTER (OUTPATIENT)
Dept: RADIOLOGY | Facility: OTHER | Age: 61
Discharge: HOME | End: 2021-03-10
Attending: NURSE PRACTITIONER
Payer: MEDICARE

## 2021-03-10 ENCOUNTER — OFFICE VISIT (OUTPATIENT)
Dept: PAIN MEDICINE | Facility: CLINIC | Age: 61
End: 2021-03-10
Payer: MEDICARE

## 2021-03-10 VITALS
HEART RATE: 101 BPM | BODY MASS INDEX: 28.31 KG/M2 | WEIGHT: 149.94 LBS | SYSTOLIC BLOOD PRESSURE: 129 MMHG | HEIGHT: 61 IN | TEMPERATURE: 97 F | DIASTOLIC BLOOD PRESSURE: 79 MMHG | RESPIRATION RATE: 18 BRPM

## 2021-03-10 DIAGNOSIS — M47.819 SPONDYLOSIS WITHOUT MYELOPATHY: ICD-10-CM

## 2021-03-10 DIAGNOSIS — M47.9 OSTEOARTHRITIS OF SPINE, UNSPECIFIED SPINAL OSTEOARTHRITIS COMPLICATION STATUS, UNSPECIFIED SPINAL REGION: ICD-10-CM

## 2021-03-10 DIAGNOSIS — M47.9 OSTEOARTHRITIS OF SPINE, UNSPECIFIED SPINAL OSTEOARTHRITIS COMPLICATION STATUS, UNSPECIFIED SPINAL REGION: Primary | ICD-10-CM

## 2021-03-10 DIAGNOSIS — M16.11 OSTEOARTHRITIS OF RIGHT HIP, UNSPECIFIED OSTEOARTHRITIS TYPE: ICD-10-CM

## 2021-03-10 DIAGNOSIS — M47.816 SPONDYLOSIS OF LUMBAR REGION WITHOUT MYELOPATHY OR RADICULOPATHY: ICD-10-CM

## 2021-03-10 DIAGNOSIS — M16.11 PRIMARY OSTEOARTHRITIS OF RIGHT HIP: ICD-10-CM

## 2021-03-10 DIAGNOSIS — M47.816 LUMBAR SPONDYLOSIS: ICD-10-CM

## 2021-03-10 PROCEDURE — 1125F PR PAIN SEVERITY QUANTIFIED, PAIN PRESENT: ICD-10-PCS | Mod: S$GLB,,, | Performed by: NURSE PRACTITIONER

## 2021-03-10 PROCEDURE — 72052 X-RAY EXAM NECK SPINE 6/>VWS: CPT | Mod: 26,,, | Performed by: RADIOLOGY

## 2021-03-10 PROCEDURE — 72052 X-RAY EXAM NECK SPINE 6/>VWS: CPT | Mod: TC,FY

## 2021-03-10 PROCEDURE — 3008F PR BODY MASS INDEX (BMI) DOCUMENTED: ICD-10-PCS | Mod: CPTII,S$GLB,, | Performed by: NURSE PRACTITIONER

## 2021-03-10 PROCEDURE — 99214 PR OFFICE/OUTPT VISIT, EST, LEVL IV, 30-39 MIN: ICD-10-PCS | Mod: S$GLB,,, | Performed by: NURSE PRACTITIONER

## 2021-03-10 PROCEDURE — 99999 PR PBB SHADOW E&M-EST. PATIENT-LVL V: ICD-10-PCS | Mod: PBBFAC,,, | Performed by: NURSE PRACTITIONER

## 2021-03-10 PROCEDURE — 1125F AMNT PAIN NOTED PAIN PRSNT: CPT | Mod: S$GLB,,, | Performed by: NURSE PRACTITIONER

## 2021-03-10 PROCEDURE — 3008F BODY MASS INDEX DOCD: CPT | Mod: CPTII,S$GLB,, | Performed by: NURSE PRACTITIONER

## 2021-03-10 PROCEDURE — 99999 PR PBB SHADOW E&M-EST. PATIENT-LVL V: CPT | Mod: PBBFAC,,, | Performed by: NURSE PRACTITIONER

## 2021-03-10 PROCEDURE — 72052 XR CERVICAL SPINE 5 VIEW WITH FLEX AND EXT: ICD-10-PCS | Mod: 26,,, | Performed by: RADIOLOGY

## 2021-03-10 PROCEDURE — 99214 OFFICE O/P EST MOD 30 MIN: CPT | Mod: S$GLB,,, | Performed by: NURSE PRACTITIONER

## 2021-03-11 ENCOUNTER — PATIENT MESSAGE (OUTPATIENT)
Dept: RHEUMATOLOGY | Facility: CLINIC | Age: 61
End: 2021-03-11

## 2021-03-11 NOTE — DISCHARGE SUMMARY
[facility]  Orthopedics  Discharge Summary      Patient Name: Joyce Peterson  MRN: [id number]  Admission Date: 5/21/2019  Hospital Length of Stay: 2 days  Discharge Date and Time: No discharge date for patient encounter.  Attending Physician: López Duff MD   Discharging Provider: Ricardo Amezquita MD  Primary Care Provider: Becca Peters DO    HPI: see hpi    Procedure(s) (LRB):  INSERTION, INTRAMEDULLARY ARIEL, FEMUR (Left)      Hospital Course: Patient presented for above procedure.  Tolerated it well and was discharged home POD 2 after voiding, tolerating diet, ambulating, pain controlled.  Discharge instructions, follow-up appointment, and med rec are below.          Significant Diagnostic Studies: No pertinent studies.    Pending Diagnostic Studies:     None        Final Active Diagnoses:    Diagnosis Date Noted POA    PRINCIPAL PROBLEM:  Stress fracture of neck of left femur [M84.352A] 05/20/2019 Yes      Problems Resolved During this Admission:      Discharged Condition: stable    Disposition: Home or Self Care    Follow Up:  Follow-up Information     Follow up In 2 weeks.               Patient Instructions:      Call MD for:  temperature >100.4     Call MD for:  persistent nausea and vomiting or diarrhea     Call MD for:  severe uncontrolled pain     Call MD for:  redness, tenderness, or signs of infection (pain, swelling, redness, odor or green/yellow discharge around incision site)     Call MD for:  difficulty breathing or increased cough     Call MD for:  severe persistent headache     Call MD for:  worsening rash     Call MD for:  persistent dizziness, light-headedness, or visual disturbances     Call MD for:  increased confusion or weakness     Medications:  Reconciled Home Medications:      Medication List      START taking these medications    ondansetron 4 MG tablet  Commonly known as:  ZOFRAN  Take 2 tablets (8 mg total) by mouth every 8 (eight) hours as needed.    rx sent for macrobid  Please advise pushing fluids and observing for sx of pyelo  We will f/u with results when available for review   oxyCODONE-acetaminophen 5-325 mg per tablet  Commonly known as:  PERCOCET  Take 1-2 tablets by mouth every 6 (six) hours as needed for Pain.        CHANGE how you take these medications    * aspirin 81 mg Tab  Take 81 mg by mouth every morning.  What changed:  Another medication with the same name was added. Make sure you understand how and when to take each.     * aspirin 81 MG EC tablet  Commonly known as:  ECOTRIN  Take 1 tablet (81 mg total) by mouth 2 (two) times daily.  What changed:  You were already taking a medication with the same name, and this prescription was added. Make sure you understand how and when to take each.     azelastine 137 mcg (0.1 %) nasal spray  Commonly known as:  ASTELIN  1 spray (137 mcg total) by Nasal route 2 (two) times daily.  What changed:    · when to take this  · reasons to take this     * docusate sodium 100 MG capsule  Commonly known as:  COLACE  Take 1 capsule (100 mg total) by mouth 3 (three) times daily.  What changed:  Another medication with the same name was added. Make sure you understand how and when to take each.     * docusate sodium 100 MG capsule  Commonly known as:  COLACE  Take 1 capsule (100 mg total) by mouth 2 (two) times daily as needed.  What changed:  You were already taking a medication with the same name, and this prescription was added. Make sure you understand how and when to take each.         * This list has 4 medication(s) that are the same as other medications prescribed for you. Read the directions carefully, and ask your doctor or other care provider to review them with you.            CONTINUE taking these medications    budesonide-formoterol 160-4.5 mcg 160-4.5 mcg/actuation Hfaa  Commonly known as:  SYMBICORT  Inhale 2 puffs into the lungs every 12 (twelve) hours.     CITRACAL PLUS D 315-200 mg-unit per tablet  Generic drug:  calcium citrate-vitamin D3 315-200 mg  Take 1 tablet by mouth 2 (two) times daily.     diclofenac sodium 1 %  Gel  Commonly known as:  VOLTAREN  Apply 2 g topically 4 (four) times daily as needed.     fluconazole 100 MG tablet  Commonly known as:  DIFLUCAN  Take 100 mg by mouth once daily.     fluticasone propionate 50 mcg/actuation nasal spray  Commonly known as:  FLONASE     FLUZONE QUAD 1669-4951 (PF) 60 mcg (15 mcg x 4)/0.5 mL Syrg  Generic drug:  flu vac [id number] 36mos up(PF)  To be administered by Pharm D     INV PROPULSID 10 MG  Take 1 tablets (20 mg) by mouth 4 (four) times daily. FOR INVESTIGATIONAL USE ONLY. Protocol [facility]-USA-154     KEVZARA 200 mg/1.14 mL Syrg  Generic drug:  sarilumab  Inject 1.14 mLs (200 mg total) into the skin every 14 (fourteen) days.     leflunomide 20 MG Tab  Commonly known as:  ARAVA  Take 1 tablet (20 mg total) by mouth once daily.     MAXALT 10 MG tablet  Generic drug:  rizatriptan  Take 10 mg by mouth as needed for Migraine.     montelukast 10 mg tablet  Commonly known as:  SINGULAIR  Take 1 tablet (10 mg total) by mouth nightly.     MUCINEX ORAL  Take 400 mg by mouth every 4 (four) hours as needed.     naproxen 500 MG tablet  Commonly known as:  NAPROSYN  TAKE 1 TABLET TWICE A DAY AS NEEDED     omeprazole 40 MG capsule  Commonly known as:  PRILOSEC  TAKE ONE CAPSULE BY MOUTH EVERY MORNING     omeprazole-sodium bicarbonate 40-1.1 mg-gram per capsule  Commonly known as:  ZEGERID  TAKE 1 CAPSULE BY MOUTH EVERY MORNING.     phenazopyridine 200 MG tablet  Commonly known as:  PYRIDIUM  Take 1 tablet by mouth four times daily as needed for urinary pain.     POTASSIUM ORAL  Take by mouth once daily.     * predniSONE 1 MG tablet  Commonly known as:  DELTASONE  Take 1 tablet (1 mg total) by mouth once daily.     * predniSONE 5 MG tablet  Commonly known as:  DELTASONE  Take 1 tablet (5 mg total) by mouth once daily.     PREMARIN 0.625 MG tablet  Generic drug:  estrogens (conjugated)  Take 0.625 mg by mouth once daily.     PROMETRIUM 100 MG capsule  Generic drug:  progesterone  Take 100 mg by  "mouth every morning. 1 Capsule Oral Every day, morning     PROVENTIL HFA 90 mcg/actuation inhaler  Generic drug:  albuterol  INHALE 2 PUFFS EVERY 6 HOURS AS NEEDED     RESTASIS 0.05 % ophthalmic emulsion  Generic drug:  cycloSPORINE  PLACE 0.4 MLS (1 DROP TOTAL) INTO BOTH EYES 2 (TWO) TIMES DAILY.     rosuvastatin 20 MG tablet  Commonly known as:  CRESTOR  Take 1 tablet (20 mg total) by mouth every evening.     SHINGRIX (PF) 50 mcg/0.5 mL injection  Generic drug:  varicella-zoster [id number] (PF)  Inject into the muscle.     sucralfate 1 gram tablet  Commonly known as:  CARAFATE  TAKE 1 TABLET (1 G TOTAL) BY MOUTH 4 (FOUR) TIMES DAILY.     syringe with needle 1 mL 27 x 1/2" Syrg  Commonly known as:  TUBERCULIN SYRINGE  To administer methotrexate 7.5mg sc once a week     traMADol 50 mg tablet  Commonly known as:  ULTRAM  Take 1 tablet (50 mg total) by mouth every 24 hours as needed for Pain.     valACYclovir 1000 MG tablet  Commonly known as:  VALTREX  TAKE 1 TABLET BY MOUTH TWICE A DAY AS NEEDED         * This list has 2 medication(s) that are the same as other medications prescribed for you. Read the directions carefully, and ask your doctor or other care provider to review them with you.                Ricardo Amezquita MD  Orthopedics  [facility]  "

## 2021-03-12 ENCOUNTER — PATIENT MESSAGE (OUTPATIENT)
Dept: RHEUMATOLOGY | Facility: CLINIC | Age: 61
End: 2021-03-12

## 2021-03-16 ENCOUNTER — OFFICE VISIT (OUTPATIENT)
Dept: ORTHOPEDICS | Facility: CLINIC | Age: 61
End: 2021-03-16
Payer: MEDICARE

## 2021-03-16 DIAGNOSIS — M19.072 DEGENERATIVE JOINT DISEASE OF HINDFOOT, LEFT: ICD-10-CM

## 2021-03-16 DIAGNOSIS — M66.872 NONTRAUMATIC RUPTURE OF POSTERIOR TIBIAL TENDON, LEFT: Primary | ICD-10-CM

## 2021-03-16 DIAGNOSIS — M21.42 PES PLANOVALGUS, ACQUIRED, LEFT: ICD-10-CM

## 2021-03-16 PROCEDURE — 1125F AMNT PAIN NOTED PAIN PRSNT: CPT | Mod: S$GLB,,, | Performed by: ORTHOPAEDIC SURGERY

## 2021-03-16 PROCEDURE — 99214 OFFICE O/P EST MOD 30 MIN: CPT | Mod: S$GLB,,, | Performed by: ORTHOPAEDIC SURGERY

## 2021-03-16 PROCEDURE — 99999 PR PBB SHADOW E&M-EST. PATIENT-LVL IV: CPT | Mod: PBBFAC,,, | Performed by: ORTHOPAEDIC SURGERY

## 2021-03-16 PROCEDURE — 99999 PR PBB SHADOW E&M-EST. PATIENT-LVL IV: ICD-10-PCS | Mod: PBBFAC,,, | Performed by: ORTHOPAEDIC SURGERY

## 2021-03-16 PROCEDURE — 99214 PR OFFICE/OUTPT VISIT, EST, LEVL IV, 30-39 MIN: ICD-10-PCS | Mod: S$GLB,,, | Performed by: ORTHOPAEDIC SURGERY

## 2021-03-16 PROCEDURE — 1125F PR PAIN SEVERITY QUANTIFIED, PAIN PRESENT: ICD-10-PCS | Mod: S$GLB,,, | Performed by: ORTHOPAEDIC SURGERY

## 2021-03-17 ENCOUNTER — PROCEDURE VISIT (OUTPATIENT)
Dept: HEPATOLOGY | Facility: CLINIC | Age: 61
End: 2021-03-17
Payer: MEDICARE

## 2021-03-17 ENCOUNTER — OFFICE VISIT (OUTPATIENT)
Dept: HEPATOLOGY | Facility: CLINIC | Age: 61
End: 2021-03-17
Payer: MEDICARE

## 2021-03-17 ENCOUNTER — HOSPITAL ENCOUNTER (OUTPATIENT)
Dept: RADIOLOGY | Facility: CLINIC | Age: 61
Discharge: HOME | End: 2021-03-17
Attending: INTERNAL MEDICINE
Payer: MEDICARE

## 2021-03-17 ENCOUNTER — TELEPHONE (OUTPATIENT)
Dept: HEPATOLOGY | Facility: CLINIC | Age: 61
End: 2021-03-17

## 2021-03-17 VITALS
HEIGHT: 61 IN | DIASTOLIC BLOOD PRESSURE: 69 MMHG | BODY MASS INDEX: 28.47 KG/M2 | WEIGHT: 150.81 LBS | RESPIRATION RATE: 16 BRPM | HEART RATE: 95 BPM | SYSTOLIC BLOOD PRESSURE: 125 MMHG | TEMPERATURE: 98 F

## 2021-03-17 DIAGNOSIS — R74.8 ELEVATED SERUM GGT LEVEL: Primary | ICD-10-CM

## 2021-03-17 DIAGNOSIS — Z79.631 LONG TERM METHOTREXATE USER: ICD-10-CM

## 2021-03-17 DIAGNOSIS — M81.8 STEROID-INDUCED OSTEOPOROSIS: ICD-10-CM

## 2021-03-17 DIAGNOSIS — T38.0X5A STEROID-INDUCED OSTEOPOROSIS: ICD-10-CM

## 2021-03-17 DIAGNOSIS — R74.01 TRANSAMINASEMIA: ICD-10-CM

## 2021-03-17 PROCEDURE — 77080 DXA BONE DENSITY AXIAL: CPT | Mod: 26,,, | Performed by: INTERNAL MEDICINE

## 2021-03-17 PROCEDURE — 77080 DXA BONE DENSITY AXIAL: CPT | Mod: TC

## 2021-03-17 PROCEDURE — 1126F AMNT PAIN NOTED NONE PRSNT: CPT | Mod: S$GLB,,, | Performed by: NURSE PRACTITIONER

## 2021-03-17 PROCEDURE — 1126F PR PAIN SEVERITY QUANTIFIED, NO PAIN PRESENT: ICD-10-PCS | Mod: S$GLB,,, | Performed by: NURSE PRACTITIONER

## 2021-03-17 PROCEDURE — 99214 OFFICE O/P EST MOD 30 MIN: CPT | Mod: S$GLB,,, | Performed by: NURSE PRACTITIONER

## 2021-03-17 PROCEDURE — 99999 PR PBB SHADOW E&M-EST. PATIENT-LVL V: CPT | Mod: PBBFAC,,, | Performed by: NURSE PRACTITIONER

## 2021-03-17 PROCEDURE — 3008F PR BODY MASS INDEX (BMI) DOCUMENTED: ICD-10-PCS | Mod: CPTII,S$GLB,, | Performed by: NURSE PRACTITIONER

## 2021-03-17 PROCEDURE — 99999 PR PBB SHADOW E&M-EST. PATIENT-LVL V: ICD-10-PCS | Mod: PBBFAC,,, | Performed by: NURSE PRACTITIONER

## 2021-03-17 PROCEDURE — 99214 PR OFFICE/OUTPT VISIT, EST, LEVL IV, 30-39 MIN: ICD-10-PCS | Mod: S$GLB,,, | Performed by: NURSE PRACTITIONER

## 2021-03-17 PROCEDURE — 77080 DEXA BONE DENSITY SPINE HIP: ICD-10-PCS | Mod: 26,,, | Performed by: INTERNAL MEDICINE

## 2021-03-17 PROCEDURE — 91200 FIBROSCAN (VIBRATION CONTROLLED TRANSIENT ELASTOGRAPHY): ICD-10-PCS | Mod: S$GLB,,, | Performed by: NURSE PRACTITIONER

## 2021-03-17 PROCEDURE — 3008F BODY MASS INDEX DOCD: CPT | Mod: CPTII,S$GLB,, | Performed by: NURSE PRACTITIONER

## 2021-03-17 PROCEDURE — 91200 LIVER ELASTOGRAPHY: CPT | Mod: S$GLB,,, | Performed by: NURSE PRACTITIONER

## 2021-03-18 ENCOUNTER — PATIENT MESSAGE (OUTPATIENT)
Dept: RHEUMATOLOGY | Facility: CLINIC | Age: 61
End: 2021-03-18

## 2021-03-24 ENCOUNTER — OFFICE VISIT (OUTPATIENT)
Dept: UROLOGY | Facility: CLINIC | Age: 61
End: 2021-03-24
Payer: MEDICARE

## 2021-03-24 VITALS
TEMPERATURE: 98 F | DIASTOLIC BLOOD PRESSURE: 82 MMHG | BODY MASS INDEX: 28.31 KG/M2 | OXYGEN SATURATION: 97 % | RESPIRATION RATE: 18 BRPM | HEART RATE: 98 BPM | HEIGHT: 61 IN | SYSTOLIC BLOOD PRESSURE: 118 MMHG | WEIGHT: 149.94 LBS

## 2021-03-24 DIAGNOSIS — N95.2 ATROPHIC VAGINITIS: ICD-10-CM

## 2021-03-24 DIAGNOSIS — R35.0 URINARY FREQUENCY: Primary | ICD-10-CM

## 2021-03-24 DIAGNOSIS — R35.1 NOCTURIA: ICD-10-CM

## 2021-03-24 PROCEDURE — 3008F PR BODY MASS INDEX (BMI) DOCUMENTED: ICD-10-PCS | Mod: CPTII,S$GLB,, | Performed by: UROLOGY

## 2021-03-24 PROCEDURE — 99214 OFFICE O/P EST MOD 30 MIN: CPT | Mod: S$GLB,,, | Performed by: UROLOGY

## 2021-03-24 PROCEDURE — 99999 PR PBB SHADOW E&M-EST. PATIENT-LVL V: CPT | Mod: PBBFAC,,, | Performed by: UROLOGY

## 2021-03-24 PROCEDURE — 1126F AMNT PAIN NOTED NONE PRSNT: CPT | Mod: S$GLB,,, | Performed by: UROLOGY

## 2021-03-24 PROCEDURE — 99214 PR OFFICE/OUTPT VISIT, EST, LEVL IV, 30-39 MIN: ICD-10-PCS | Mod: S$GLB,,, | Performed by: UROLOGY

## 2021-03-24 PROCEDURE — 1126F PR PAIN SEVERITY QUANTIFIED, NO PAIN PRESENT: ICD-10-PCS | Mod: S$GLB,,, | Performed by: UROLOGY

## 2021-03-24 PROCEDURE — 3008F BODY MASS INDEX DOCD: CPT | Mod: CPTII,S$GLB,, | Performed by: UROLOGY

## 2021-03-24 PROCEDURE — 99999 PR PBB SHADOW E&M-EST. PATIENT-LVL V: ICD-10-PCS | Mod: PBBFAC,,, | Performed by: UROLOGY

## 2021-03-29 ENCOUNTER — PATIENT MESSAGE (OUTPATIENT)
Dept: ENDOCRINOLOGY | Facility: CLINIC | Age: 61
End: 2021-03-29

## 2021-03-29 ENCOUNTER — HOSPITAL ENCOUNTER (OUTPATIENT)
Facility: OTHER | Age: 61
Discharge: HOME | End: 2021-03-29
Attending: ANESTHESIOLOGY | Admitting: ANESTHESIOLOGY
Payer: MEDICARE

## 2021-03-29 VITALS
BODY MASS INDEX: 28.32 KG/M2 | DIASTOLIC BLOOD PRESSURE: 76 MMHG | WEIGHT: 150 LBS | HEIGHT: 61 IN | RESPIRATION RATE: 16 BRPM | SYSTOLIC BLOOD PRESSURE: 122 MMHG | TEMPERATURE: 98 F | OXYGEN SATURATION: 99 % | HEART RATE: 76 BPM

## 2021-03-29 DIAGNOSIS — M47.9 OSTEOARTHRITIS OF SPINE, UNSPECIFIED SPINAL OSTEOARTHRITIS COMPLICATION STATUS, UNSPECIFIED SPINAL REGION: Primary | ICD-10-CM

## 2021-03-29 DIAGNOSIS — G89.29 CHRONIC PAIN: ICD-10-CM

## 2021-03-29 DIAGNOSIS — M47.819 SPONDYLOSIS WITHOUT MYELOPATHY: ICD-10-CM

## 2021-03-29 PROCEDURE — 64494 INJ PARAVERT F JNT L/S 2 LEV: CPT | Mod: 50,,, | Performed by: ANESTHESIOLOGY

## 2021-03-29 PROCEDURE — 64493 INJ PARAVERT F JNT L/S 1 LEV: CPT | Mod: 50,,, | Performed by: ANESTHESIOLOGY

## 2021-03-29 PROCEDURE — 64493 INJ PARAVERT F JNT L/S 1 LEV: CPT | Mod: 50 | Performed by: ANESTHESIOLOGY

## 2021-03-29 PROCEDURE — 25500020 PHARM REV CODE 255: Performed by: ANESTHESIOLOGY

## 2021-03-29 PROCEDURE — 63600175 PHARM REV CODE 636 W HCPCS: Performed by: ANESTHESIOLOGY

## 2021-03-29 PROCEDURE — 64494 PR INJ DX/THER AGNT PARAVERT FACET JOINT,IMG GUIDE,LUMBAR/SAC, 2ND LEVEL: ICD-10-PCS | Mod: 50,,, | Performed by: ANESTHESIOLOGY

## 2021-03-29 PROCEDURE — 64494 INJ PARAVERT F JNT L/S 2 LEV: CPT | Mod: 50 | Performed by: ANESTHESIOLOGY

## 2021-03-29 PROCEDURE — 64493 PR INJ DX/THER AGNT PARAVERT FACET JOINT,IMG GUIDE,LUMBAR/SAC,1ST LVL: ICD-10-PCS | Mod: 50,,, | Performed by: ANESTHESIOLOGY

## 2021-03-29 PROCEDURE — 64495 INJ PARAVERT F JNT L/S 3 LEV: CPT | Mod: 50,,, | Performed by: ANESTHESIOLOGY

## 2021-03-29 PROCEDURE — 64495 PR INJ DX/THER AGNT PARAVERT FACET JOINT,IMG GUIDE,LUMBAR/SAC, ADD LEVEL: ICD-10-PCS | Mod: 50,,, | Performed by: ANESTHESIOLOGY

## 2021-03-29 PROCEDURE — 64495 INJ PARAVERT F JNT L/S 3 LEV: CPT | Mod: 50 | Performed by: ANESTHESIOLOGY

## 2021-03-29 PROCEDURE — 25000003 PHARM REV CODE 250: Performed by: ANESTHESIOLOGY

## 2021-03-29 RX ORDER — LIDOCAINE HYDROCHLORIDE 10 MG/ML
INJECTION INFILTRATION; PERINEURAL
Status: DISCONTINUED | OUTPATIENT
Start: 2021-03-29 | End: 2021-03-29 | Stop reason: HOSPADM

## 2021-03-29 RX ORDER — ALPRAZOLAM 0.5 MG/1
1 TABLET ORAL ONCE
Status: COMPLETED | OUTPATIENT
Start: 2021-03-29 | End: 2021-03-29

## 2021-03-29 RX ORDER — TRIAMCINOLONE ACETONIDE 40 MG/ML
INJECTION, SUSPENSION INTRA-ARTICULAR; INTRAMUSCULAR
Status: DISCONTINUED | OUTPATIENT
Start: 2021-03-29 | End: 2021-03-29 | Stop reason: HOSPADM

## 2021-03-29 RX ORDER — DIPHENHYDRAMINE HYDROCHLORIDE 50 MG/ML
25 INJECTION INTRAMUSCULAR; INTRAVENOUS ONCE
Status: COMPLETED | OUTPATIENT
Start: 2021-03-29 | End: 2021-03-29

## 2021-03-29 RX ORDER — SODIUM CHLORIDE 9 MG/ML
INJECTION, SOLUTION INTRAVENOUS CONTINUOUS
Status: DISCONTINUED | OUTPATIENT
Start: 2021-03-29 | End: 2021-03-29 | Stop reason: HOSPADM

## 2021-03-29 RX ORDER — BUPIVACAINE HYDROCHLORIDE 2.5 MG/ML
INJECTION, SOLUTION EPIDURAL; INFILTRATION; INTRACAUDAL
Status: DISCONTINUED | OUTPATIENT
Start: 2021-03-29 | End: 2021-03-29 | Stop reason: HOSPADM

## 2021-03-29 RX ADMIN — DIPHENHYDRAMINE HYDROCHLORIDE 25 MG: 50 INJECTION INTRAMUSCULAR; INTRAVENOUS at 07:03

## 2021-03-29 RX ADMIN — ALPRAZOLAM 1 MG: 0.5 TABLET ORAL at 07:03

## 2021-04-05 ENCOUNTER — OFFICE VISIT (OUTPATIENT)
Dept: URGENT CARE | Facility: CLINIC | Age: 61
End: 2021-04-05
Payer: MEDICARE

## 2021-04-05 ENCOUNTER — TELEPHONE (OUTPATIENT)
Dept: ORTHOPEDICS | Facility: CLINIC | Age: 61
End: 2021-04-05

## 2021-04-05 DIAGNOSIS — M25.512 LEFT SHOULDER PAIN: Primary | ICD-10-CM

## 2021-04-06 ENCOUNTER — TELEPHONE (OUTPATIENT)
Dept: ORTHOPEDICS | Facility: CLINIC | Age: 61
End: 2021-04-06

## 2021-04-06 ENCOUNTER — OFFICE VISIT (OUTPATIENT)
Dept: ORTHOPEDICS | Facility: CLINIC | Age: 61
End: 2021-04-06
Payer: MEDICARE

## 2021-04-06 ENCOUNTER — SPECIALTY PHARMACY (OUTPATIENT)
Dept: PHARMACY | Facility: CLINIC | Age: 61
End: 2021-04-06

## 2021-04-06 VITALS
HEART RATE: 98 BPM | WEIGHT: 149.94 LBS | HEIGHT: 61 IN | SYSTOLIC BLOOD PRESSURE: 121 MMHG | DIASTOLIC BLOOD PRESSURE: 80 MMHG | BODY MASS INDEX: 28.31 KG/M2

## 2021-04-06 DIAGNOSIS — S42.202A CLOSED FRACTURE OF PROXIMAL END OF LEFT HUMERUS, UNSPECIFIED FRACTURE MORPHOLOGY, INITIAL ENCOUNTER: Primary | ICD-10-CM

## 2021-04-06 DIAGNOSIS — M25.512 LEFT SHOULDER PAIN: Primary | ICD-10-CM

## 2021-04-06 PROCEDURE — 3008F PR BODY MASS INDEX (BMI) DOCUMENTED: ICD-10-PCS | Mod: CPTII,S$GLB,, | Performed by: ORTHOPAEDIC SURGERY

## 2021-04-06 PROCEDURE — 99999 PR PBB SHADOW E&M-EST. PATIENT-LVL V: CPT | Mod: PBBFAC,,, | Performed by: ORTHOPAEDIC SURGERY

## 2021-04-06 PROCEDURE — 1125F AMNT PAIN NOTED PAIN PRSNT: CPT | Mod: S$GLB,,, | Performed by: ORTHOPAEDIC SURGERY

## 2021-04-06 PROCEDURE — 1125F PR PAIN SEVERITY QUANTIFIED, PAIN PRESENT: ICD-10-PCS | Mod: S$GLB,,, | Performed by: ORTHOPAEDIC SURGERY

## 2021-04-06 PROCEDURE — 99214 PR OFFICE/OUTPT VISIT, EST, LEVL IV, 30-39 MIN: ICD-10-PCS | Mod: S$GLB,,, | Performed by: ORTHOPAEDIC SURGERY

## 2021-04-06 PROCEDURE — 99999 PR PBB SHADOW E&M-EST. PATIENT-LVL V: ICD-10-PCS | Mod: PBBFAC,,, | Performed by: ORTHOPAEDIC SURGERY

## 2021-04-06 PROCEDURE — 99214 OFFICE O/P EST MOD 30 MIN: CPT | Mod: S$GLB,,, | Performed by: ORTHOPAEDIC SURGERY

## 2021-04-06 PROCEDURE — 3008F BODY MASS INDEX DOCD: CPT | Mod: CPTII,S$GLB,, | Performed by: ORTHOPAEDIC SURGERY

## 2021-04-06 RX ORDER — OXYCODONE AND ACETAMINOPHEN 5; 325 MG/1; MG/1
1 TABLET ORAL
Qty: 42 TABLET | Refills: 0 | Status: SHIPPED | OUTPATIENT
Start: 2021-04-06 | End: 2021-06-17

## 2021-04-07 ENCOUNTER — HOSPITAL ENCOUNTER (OUTPATIENT)
Dept: RADIOLOGY | Facility: HOSPITAL | Age: 61
Discharge: HOME | End: 2021-04-07
Attending: PHYSICIAN ASSISTANT
Payer: MEDICARE

## 2021-04-07 DIAGNOSIS — S42.202A CLOSED FRACTURE OF PROXIMAL END OF LEFT HUMERUS, UNSPECIFIED FRACTURE MORPHOLOGY, INITIAL ENCOUNTER: ICD-10-CM

## 2021-04-07 PROCEDURE — 73200 CT SHOULDER WITHOUT CONTRAST LEFT: ICD-10-PCS | Mod: 26,LT,, | Performed by: RADIOLOGY

## 2021-04-07 PROCEDURE — 73200 CT UPPER EXTREMITY W/O DYE: CPT | Mod: 26,LT,, | Performed by: RADIOLOGY

## 2021-04-07 PROCEDURE — 73200 CT UPPER EXTREMITY W/O DYE: CPT | Mod: TC,LT

## 2021-04-08 ENCOUNTER — PATIENT MESSAGE (OUTPATIENT)
Dept: ORTHOPEDICS | Facility: CLINIC | Age: 61
End: 2021-04-08

## 2021-04-08 ENCOUNTER — PATIENT OUTREACH (OUTPATIENT)
Dept: ADMINISTRATIVE | Facility: OTHER | Age: 61
End: 2021-04-08

## 2021-04-08 ENCOUNTER — TELEPHONE (OUTPATIENT)
Dept: ORTHOPEDICS | Facility: CLINIC | Age: 61
End: 2021-04-08

## 2021-04-09 ENCOUNTER — TELEPHONE (OUTPATIENT)
Dept: PAIN MEDICINE | Facility: CLINIC | Age: 61
End: 2021-04-09

## 2021-04-13 ENCOUNTER — OFFICE VISIT (OUTPATIENT)
Dept: ORTHOPEDICS | Facility: CLINIC | Age: 61
End: 2021-04-13
Payer: MEDICARE

## 2021-04-13 ENCOUNTER — HOSPITAL ENCOUNTER (OUTPATIENT)
Dept: RADIOLOGY | Facility: OTHER | Age: 61
Discharge: HOME | End: 2021-04-13
Attending: PHYSICIAN ASSISTANT
Payer: MEDICARE

## 2021-04-13 VITALS
WEIGHT: 149.94 LBS | SYSTOLIC BLOOD PRESSURE: 123 MMHG | HEIGHT: 60 IN | BODY MASS INDEX: 29.44 KG/M2 | HEART RATE: 85 BPM | DIASTOLIC BLOOD PRESSURE: 80 MMHG

## 2021-04-13 DIAGNOSIS — S42.225B: Primary | ICD-10-CM

## 2021-04-13 DIAGNOSIS — T14.8XXA FRACTURE: Primary | ICD-10-CM

## 2021-04-13 DIAGNOSIS — S42.202A CLOSED FRACTURE OF PROXIMAL END OF LEFT HUMERUS, UNSPECIFIED FRACTURE MORPHOLOGY, INITIAL ENCOUNTER: ICD-10-CM

## 2021-04-13 PROCEDURE — 1126F PR PAIN SEVERITY QUANTIFIED, NO PAIN PRESENT: ICD-10-PCS | Mod: S$GLB,,, | Performed by: ORTHOPAEDIC SURGERY

## 2021-04-13 PROCEDURE — 99214 OFFICE O/P EST MOD 30 MIN: CPT | Mod: S$GLB,,, | Performed by: ORTHOPAEDIC SURGERY

## 2021-04-13 PROCEDURE — 1126F AMNT PAIN NOTED NONE PRSNT: CPT | Mod: S$GLB,,, | Performed by: ORTHOPAEDIC SURGERY

## 2021-04-13 PROCEDURE — 73030 X-RAY EXAM OF SHOULDER: CPT | Mod: TC,FY,LT

## 2021-04-13 PROCEDURE — 3008F BODY MASS INDEX DOCD: CPT | Mod: CPTII,S$GLB,, | Performed by: ORTHOPAEDIC SURGERY

## 2021-04-13 PROCEDURE — 73030 XR SHOULDER COMPLETE 2 OR MORE VIEWS LEFT: ICD-10-PCS | Mod: 26,LT,, | Performed by: RADIOLOGY

## 2021-04-13 PROCEDURE — 73030 X-RAY EXAM OF SHOULDER: CPT | Mod: 26,LT,, | Performed by: RADIOLOGY

## 2021-04-13 PROCEDURE — 99999 PR PBB SHADOW E&M-EST. PATIENT-LVL V: ICD-10-PCS | Mod: PBBFAC,,, | Performed by: ORTHOPAEDIC SURGERY

## 2021-04-13 PROCEDURE — 99214 PR OFFICE/OUTPT VISIT, EST, LEVL IV, 30-39 MIN: ICD-10-PCS | Mod: S$GLB,,, | Performed by: ORTHOPAEDIC SURGERY

## 2021-04-13 PROCEDURE — 3008F PR BODY MASS INDEX (BMI) DOCUMENTED: ICD-10-PCS | Mod: CPTII,S$GLB,, | Performed by: ORTHOPAEDIC SURGERY

## 2021-04-13 PROCEDURE — 99999 PR PBB SHADOW E&M-EST. PATIENT-LVL V: CPT | Mod: PBBFAC,,, | Performed by: ORTHOPAEDIC SURGERY

## 2021-04-14 ENCOUNTER — TELEPHONE (OUTPATIENT)
Dept: RHEUMATOLOGY | Facility: CLINIC | Age: 61
End: 2021-04-14

## 2021-04-14 RX ORDER — TRAMADOL HYDROCHLORIDE 50 MG/1
50 TABLET ORAL
Qty: 7 TABLET | Refills: 0 | OUTPATIENT
Start: 2021-04-14

## 2021-04-20 ENCOUNTER — TELEPHONE (OUTPATIENT)
Dept: RHEUMATOLOGY | Facility: CLINIC | Age: 61
End: 2021-04-20

## 2021-04-20 ENCOUNTER — PATIENT MESSAGE (OUTPATIENT)
Dept: PAIN MEDICINE | Facility: CLINIC | Age: 61
End: 2021-04-20

## 2021-04-21 ENCOUNTER — OFFICE VISIT (OUTPATIENT)
Dept: DERMATOLOGY | Facility: CLINIC | Age: 61
End: 2021-04-21
Payer: MEDICARE

## 2021-04-21 DIAGNOSIS — L21.9 SEBORRHEIC DERMATITIS, UNSPECIFIED: ICD-10-CM

## 2021-04-21 DIAGNOSIS — L30.9 HAND ECZEMA: Primary | ICD-10-CM

## 2021-04-21 DIAGNOSIS — L30.8 ASTEATOTIC ECZEMA: ICD-10-CM

## 2021-04-21 PROCEDURE — 99999 PR PBB SHADOW E&M-EST. PATIENT-LVL III: ICD-10-PCS | Mod: PBBFAC,,, | Performed by: DERMATOLOGY

## 2021-04-21 PROCEDURE — 99214 PR OFFICE/OUTPT VISIT, EST, LEVL IV, 30-39 MIN: ICD-10-PCS | Mod: S$GLB,,, | Performed by: DERMATOLOGY

## 2021-04-21 PROCEDURE — 99999 PR PBB SHADOW E&M-EST. PATIENT-LVL III: CPT | Mod: PBBFAC,,, | Performed by: DERMATOLOGY

## 2021-04-21 PROCEDURE — 1126F AMNT PAIN NOTED NONE PRSNT: CPT | Mod: S$GLB,,, | Performed by: DERMATOLOGY

## 2021-04-21 PROCEDURE — 99214 OFFICE O/P EST MOD 30 MIN: CPT | Mod: S$GLB,,, | Performed by: DERMATOLOGY

## 2021-04-21 PROCEDURE — 1126F PR PAIN SEVERITY QUANTIFIED, NO PAIN PRESENT: ICD-10-PCS | Mod: S$GLB,,, | Performed by: DERMATOLOGY

## 2021-04-21 RX ORDER — CLOBETASOL PROPIONATE 0.5 MG/G
OINTMENT TOPICAL
Qty: 45 G | Refills: 1 | Status: SHIPPED | OUTPATIENT
Start: 2021-04-21 | End: 2021-12-08

## 2021-04-21 RX ORDER — MOMETASONE FUROATE 1 MG/ML
SOLUTION TOPICAL
Qty: 60 ML | Refills: 3 | Status: SHIPPED | OUTPATIENT
Start: 2021-04-21 | End: 2021-12-08

## 2021-04-21 RX ORDER — FLUOCINONIDE TOPICAL SOLUTION USP, 0.05% 0.5 MG/ML
SOLUTION TOPICAL
Qty: 60 ML | Refills: 3 | Status: SHIPPED | OUTPATIENT
Start: 2021-04-21 | End: 2021-12-08

## 2021-04-25 DIAGNOSIS — M25.512 LEFT SHOULDER PAIN: Primary | ICD-10-CM

## 2021-04-26 ENCOUNTER — TELEPHONE (OUTPATIENT)
Dept: ORTHOPEDICS | Facility: CLINIC | Age: 61
End: 2021-04-26

## 2021-04-26 ENCOUNTER — SPECIALTY PHARMACY (OUTPATIENT)
Dept: PHARMACY | Facility: CLINIC | Age: 61
End: 2021-04-26

## 2021-04-26 ENCOUNTER — PATIENT MESSAGE (OUTPATIENT)
Dept: GASTROENTEROLOGY | Facility: CLINIC | Age: 61
End: 2021-04-26

## 2021-04-26 ENCOUNTER — CLINICAL SUPPORT (OUTPATIENT)
Dept: REHABILITATION | Facility: HOSPITAL | Age: 61
End: 2021-04-26
Attending: ORTHOPAEDIC SURGERY
Payer: MEDICARE

## 2021-04-26 DIAGNOSIS — S42.225B: ICD-10-CM

## 2021-04-26 DIAGNOSIS — M25.512 ACUTE PAIN OF LEFT SHOULDER: ICD-10-CM

## 2021-04-26 DIAGNOSIS — M06.9 RHEUMATOID ARTHRITIS, INVOLVING UNSPECIFIED SITE, UNSPECIFIED WHETHER RHEUMATOID FACTOR PRESENT: ICD-10-CM

## 2021-04-26 PROCEDURE — 97110 THERAPEUTIC EXERCISES: CPT

## 2021-04-26 PROCEDURE — 97161 PT EVAL LOW COMPLEX 20 MIN: CPT

## 2021-04-26 RX ORDER — SARILUMAB 200 MG/1.14ML
200 INJECTION, SOLUTION SUBCUTANEOUS
Qty: 2.28 ML | Refills: 2 | Status: SHIPPED | OUTPATIENT
Start: 2021-04-26 | End: 2021-05-25 | Stop reason: SDUPTHER

## 2021-04-27 ENCOUNTER — HOSPITAL ENCOUNTER (OUTPATIENT)
Dept: RADIOLOGY | Facility: OTHER | Age: 61
Discharge: HOME | End: 2021-04-27
Attending: ORTHOPAEDIC SURGERY
Payer: MEDICARE

## 2021-04-27 ENCOUNTER — OFFICE VISIT (OUTPATIENT)
Dept: PAIN MEDICINE | Facility: CLINIC | Age: 61
End: 2021-04-27
Payer: MEDICARE

## 2021-04-27 ENCOUNTER — OFFICE VISIT (OUTPATIENT)
Dept: ORTHOPEDICS | Facility: CLINIC | Age: 61
End: 2021-04-27
Payer: MEDICARE

## 2021-04-27 VITALS
BODY MASS INDEX: 29.67 KG/M2 | WEIGHT: 151.88 LBS | DIASTOLIC BLOOD PRESSURE: 78 MMHG | HEART RATE: 94 BPM | SYSTOLIC BLOOD PRESSURE: 125 MMHG | RESPIRATION RATE: 17 BRPM

## 2021-04-27 VITALS — DIASTOLIC BLOOD PRESSURE: 78 MMHG | SYSTOLIC BLOOD PRESSURE: 125 MMHG | HEART RATE: 94 BPM

## 2021-04-27 DIAGNOSIS — M25.512 LEFT SHOULDER PAIN: ICD-10-CM

## 2021-04-27 DIAGNOSIS — M54.17 LUMBOSACRAL RADICULOPATHY: Primary | ICD-10-CM

## 2021-04-27 DIAGNOSIS — S42.92XG: Primary | ICD-10-CM

## 2021-04-27 DIAGNOSIS — M47.9 OSTEOARTHRITIS OF SPINE, UNSPECIFIED SPINAL OSTEOARTHRITIS COMPLICATION STATUS, UNSPECIFIED SPINAL REGION: ICD-10-CM

## 2021-04-27 DIAGNOSIS — M47.819 SPONDYLOSIS WITHOUT MYELOPATHY: ICD-10-CM

## 2021-04-27 PROCEDURE — 99214 OFFICE O/P EST MOD 30 MIN: CPT | Mod: S$GLB,,, | Performed by: ORTHOPAEDIC SURGERY

## 2021-04-27 PROCEDURE — 99999 PR PBB SHADOW E&M-EST. PATIENT-LVL IV: ICD-10-PCS | Mod: PBBFAC,,, | Performed by: ORTHOPAEDIC SURGERY

## 2021-04-27 PROCEDURE — 3008F BODY MASS INDEX DOCD: CPT | Mod: CPTII,S$GLB,, | Performed by: NURSE PRACTITIONER

## 2021-04-27 PROCEDURE — 1125F PR PAIN SEVERITY QUANTIFIED, PAIN PRESENT: ICD-10-PCS | Mod: S$GLB,,, | Performed by: NURSE PRACTITIONER

## 2021-04-27 PROCEDURE — 1125F AMNT PAIN NOTED PAIN PRSNT: CPT | Mod: S$GLB,,, | Performed by: NURSE PRACTITIONER

## 2021-04-27 PROCEDURE — 73030 XR SHOULDER COMPLETE 2 OR MORE VIEWS LEFT: ICD-10-PCS | Mod: 26,LT,, | Performed by: RADIOLOGY

## 2021-04-27 PROCEDURE — 3008F PR BODY MASS INDEX (BMI) DOCUMENTED: ICD-10-PCS | Mod: CPTII,S$GLB,, | Performed by: NURSE PRACTITIONER

## 2021-04-27 PROCEDURE — 99214 PR OFFICE/OUTPT VISIT, EST, LEVL IV, 30-39 MIN: ICD-10-PCS | Mod: S$GLB,,, | Performed by: ORTHOPAEDIC SURGERY

## 2021-04-27 PROCEDURE — 99999 PR PBB SHADOW E&M-EST. PATIENT-LVL II: CPT | Mod: PBBFAC,,, | Performed by: NURSE PRACTITIONER

## 2021-04-27 PROCEDURE — 99213 OFFICE O/P EST LOW 20 MIN: CPT | Mod: S$GLB,,, | Performed by: NURSE PRACTITIONER

## 2021-04-27 PROCEDURE — 73030 X-RAY EXAM OF SHOULDER: CPT | Mod: 26,LT,, | Performed by: RADIOLOGY

## 2021-04-27 PROCEDURE — 99213 PR OFFICE/OUTPT VISIT, EST, LEVL III, 20-29 MIN: ICD-10-PCS | Mod: S$GLB,,, | Performed by: NURSE PRACTITIONER

## 2021-04-27 PROCEDURE — 1125F PR PAIN SEVERITY QUANTIFIED, PAIN PRESENT: ICD-10-PCS | Mod: S$GLB,,, | Performed by: ORTHOPAEDIC SURGERY

## 2021-04-27 PROCEDURE — 99999 PR PBB SHADOW E&M-EST. PATIENT-LVL II: ICD-10-PCS | Mod: PBBFAC,,, | Performed by: NURSE PRACTITIONER

## 2021-04-27 PROCEDURE — 1125F AMNT PAIN NOTED PAIN PRSNT: CPT | Mod: S$GLB,,, | Performed by: ORTHOPAEDIC SURGERY

## 2021-04-27 PROCEDURE — 73030 X-RAY EXAM OF SHOULDER: CPT | Mod: TC,FY,LT

## 2021-04-27 PROCEDURE — 99999 PR PBB SHADOW E&M-EST. PATIENT-LVL IV: CPT | Mod: PBBFAC,,, | Performed by: ORTHOPAEDIC SURGERY

## 2021-04-27 RX ORDER — TRAMADOL HYDROCHLORIDE 50 MG/1
50 TABLET ORAL EVERY 6 HOURS
Qty: 30 TABLET | Refills: 0 | Status: SHIPPED | OUTPATIENT
Start: 2021-04-27 | End: 2021-06-18

## 2021-04-28 ENCOUNTER — TELEPHONE (OUTPATIENT)
Dept: RHEUMATOLOGY | Facility: CLINIC | Age: 61
End: 2021-04-28

## 2021-05-10 ENCOUNTER — PATIENT MESSAGE (OUTPATIENT)
Dept: ORTHOPEDICS | Facility: CLINIC | Age: 61
End: 2021-05-10

## 2021-05-10 ENCOUNTER — PATIENT MESSAGE (OUTPATIENT)
Dept: RHEUMATOLOGY | Facility: CLINIC | Age: 61
End: 2021-05-10

## 2021-05-11 ENCOUNTER — PATIENT MESSAGE (OUTPATIENT)
Dept: HEPATOLOGY | Facility: CLINIC | Age: 61
End: 2021-05-11

## 2021-05-11 DIAGNOSIS — R74.8 ELEVATED SERUM GGT LEVEL: ICD-10-CM

## 2021-05-11 DIAGNOSIS — Z79.631 LONG TERM METHOTREXATE USER: ICD-10-CM

## 2021-05-11 DIAGNOSIS — R74.01 TRANSAMINASEMIA: Primary | ICD-10-CM

## 2021-05-12 ENCOUNTER — PATIENT MESSAGE (OUTPATIENT)
Dept: RHEUMATOLOGY | Facility: CLINIC | Age: 61
End: 2021-05-12

## 2021-05-12 ENCOUNTER — SPECIALTY PHARMACY (OUTPATIENT)
Dept: PHARMACY | Facility: CLINIC | Age: 61
End: 2021-05-12

## 2021-05-13 ENCOUNTER — PATIENT MESSAGE (OUTPATIENT)
Dept: GASTROENTEROLOGY | Facility: CLINIC | Age: 61
End: 2021-05-13

## 2021-05-14 ENCOUNTER — PATIENT MESSAGE (OUTPATIENT)
Dept: GASTROENTEROLOGY | Facility: CLINIC | Age: 61
End: 2021-05-14

## 2021-05-14 ENCOUNTER — PATIENT MESSAGE (OUTPATIENT)
Dept: UROLOGY | Facility: CLINIC | Age: 61
End: 2021-05-14

## 2021-05-14 ENCOUNTER — CLINICAL SUPPORT (OUTPATIENT)
Dept: UROLOGY | Facility: CLINIC | Age: 61
End: 2021-05-14
Payer: MEDICARE

## 2021-05-14 DIAGNOSIS — R35.1 NOCTURIA: ICD-10-CM

## 2021-05-14 DIAGNOSIS — N39.0 RECURRENT UTI: ICD-10-CM

## 2021-05-14 DIAGNOSIS — R35.0 URINARY FREQUENCY: Primary | ICD-10-CM

## 2021-05-14 DIAGNOSIS — R35.0 URINARY FREQUENCY: ICD-10-CM

## 2021-05-14 DIAGNOSIS — N39.0 RECURRENT UTI: Primary | ICD-10-CM

## 2021-05-14 LAB
BILIRUB UR QL STRIP: NEGATIVE
CLARITY UR REFRACT.AUTO: CLEAR
COLOR UR AUTO: NORMAL
GLUCOSE UR QL STRIP: NEGATIVE
HGB UR QL STRIP: NEGATIVE
KETONES UR QL STRIP: NEGATIVE
LEUKOCYTE ESTERASE UR QL STRIP: NEGATIVE
NITRITE UR QL STRIP: NEGATIVE
PH UR STRIP: 6 [PH] (ref 5–8)
PROT UR QL STRIP: NEGATIVE
SP GR UR STRIP: 1.01 (ref 1–1.03)
URN SPEC COLLECT METH UR: NORMAL

## 2021-05-14 PROCEDURE — 87086 URINE CULTURE/COLONY COUNT: CPT | Performed by: UROLOGY

## 2021-05-14 PROCEDURE — 81003 URINALYSIS AUTO W/O SCOPE: CPT | Performed by: UROLOGY

## 2021-05-14 RX ORDER — LEVOFLOXACIN 500 MG/1
500 TABLET, FILM COATED ORAL DAILY
Qty: 7 TABLET | Refills: 1 | Status: SHIPPED | OUTPATIENT
Start: 2021-05-14 | End: 2021-05-21

## 2021-05-16 LAB
BACTERIA UR CULT: NORMAL
BACTERIA UR CULT: NORMAL

## 2021-05-17 ENCOUNTER — PATIENT OUTREACH (OUTPATIENT)
Dept: ADMINISTRATIVE | Facility: OTHER | Age: 61
End: 2021-05-17

## 2021-05-17 ENCOUNTER — PATIENT MESSAGE (OUTPATIENT)
Dept: UROLOGY | Facility: CLINIC | Age: 61
End: 2021-05-17

## 2021-05-17 ENCOUNTER — TELEPHONE (OUTPATIENT)
Dept: GASTROENTEROLOGY | Facility: CLINIC | Age: 61
End: 2021-05-17

## 2021-05-17 DIAGNOSIS — K31.84 GASTROPARESIS: Primary | ICD-10-CM

## 2021-05-18 ENCOUNTER — OFFICE VISIT (OUTPATIENT)
Dept: ORTHOPEDICS | Facility: CLINIC | Age: 61
End: 2021-05-18
Payer: MEDICARE

## 2021-05-18 ENCOUNTER — HOSPITAL ENCOUNTER (OUTPATIENT)
Dept: RADIOLOGY | Facility: OTHER | Age: 61
Discharge: HOME | End: 2021-05-18
Attending: ORTHOPAEDIC SURGERY
Payer: MEDICARE

## 2021-05-18 ENCOUNTER — TELEPHONE (OUTPATIENT)
Dept: PAIN MEDICINE | Facility: CLINIC | Age: 61
End: 2021-05-18

## 2021-05-18 ENCOUNTER — CLINICAL SUPPORT (OUTPATIENT)
Dept: REHABILITATION | Facility: HOSPITAL | Age: 61
End: 2021-05-18
Attending: ORTHOPAEDIC SURGERY
Payer: MEDICARE

## 2021-05-18 DIAGNOSIS — M25.512 ACUTE PAIN OF LEFT SHOULDER: ICD-10-CM

## 2021-05-18 DIAGNOSIS — R52 PAIN: Primary | ICD-10-CM

## 2021-05-18 DIAGNOSIS — S42.92XG: Primary | ICD-10-CM

## 2021-05-18 DIAGNOSIS — S42.92XG: ICD-10-CM

## 2021-05-18 PROCEDURE — 97110 THERAPEUTIC EXERCISES: CPT

## 2021-05-18 PROCEDURE — 99214 OFFICE O/P EST MOD 30 MIN: CPT | Mod: S$GLB,,, | Performed by: ORTHOPAEDIC SURGERY

## 2021-05-18 PROCEDURE — 73030 X-RAY EXAM OF SHOULDER: CPT | Mod: 26,LT,, | Performed by: RADIOLOGY

## 2021-05-18 PROCEDURE — 1125F PR PAIN SEVERITY QUANTIFIED, PAIN PRESENT: ICD-10-PCS | Mod: S$GLB,,, | Performed by: ORTHOPAEDIC SURGERY

## 2021-05-18 PROCEDURE — 99999 PR PBB SHADOW E&M-EST. PATIENT-LVL I: CPT | Mod: PBBFAC,,, | Performed by: ORTHOPAEDIC SURGERY

## 2021-05-18 PROCEDURE — 97140 MANUAL THERAPY 1/> REGIONS: CPT

## 2021-05-18 PROCEDURE — 73030 XR SHOULDER COMPLETE 2 OR MORE VIEWS LEFT: ICD-10-PCS | Mod: 26,LT,, | Performed by: RADIOLOGY

## 2021-05-18 PROCEDURE — 73030 X-RAY EXAM OF SHOULDER: CPT | Mod: TC,FY,LT

## 2021-05-18 PROCEDURE — 99214 PR OFFICE/OUTPT VISIT, EST, LEVL IV, 30-39 MIN: ICD-10-PCS | Mod: S$GLB,,, | Performed by: ORTHOPAEDIC SURGERY

## 2021-05-18 PROCEDURE — 99999 PR PBB SHADOW E&M-EST. PATIENT-LVL I: ICD-10-PCS | Mod: PBBFAC,,, | Performed by: ORTHOPAEDIC SURGERY

## 2021-05-18 PROCEDURE — 1125F AMNT PAIN NOTED PAIN PRSNT: CPT | Mod: S$GLB,,, | Performed by: ORTHOPAEDIC SURGERY

## 2021-05-18 RX ORDER — CYCLOBENZAPRINE HCL 5 MG
5 TABLET ORAL 3 TIMES DAILY PRN
Qty: 30 TABLET | Refills: 0 | Status: SHIPPED | OUTPATIENT
Start: 2021-05-18 | End: 2021-05-28

## 2021-05-20 ENCOUNTER — PATIENT MESSAGE (OUTPATIENT)
Dept: PHARMACY | Facility: CLINIC | Age: 61
End: 2021-05-20

## 2021-05-21 ENCOUNTER — TELEPHONE (OUTPATIENT)
Dept: RHEUMATOLOGY | Facility: CLINIC | Age: 61
End: 2021-05-21

## 2021-05-21 ENCOUNTER — PATIENT MESSAGE (OUTPATIENT)
Dept: RHEUMATOLOGY | Facility: CLINIC | Age: 61
End: 2021-05-21

## 2021-05-24 ENCOUNTER — OFFICE VISIT (OUTPATIENT)
Dept: ENDOCRINOLOGY | Facility: CLINIC | Age: 61
End: 2021-05-24
Payer: MEDICARE

## 2021-05-24 VITALS
RESPIRATION RATE: 16 BRPM | BODY MASS INDEX: 30.55 KG/M2 | HEIGHT: 60 IN | SYSTOLIC BLOOD PRESSURE: 118 MMHG | DIASTOLIC BLOOD PRESSURE: 80 MMHG | WEIGHT: 155.63 LBS

## 2021-05-24 DIAGNOSIS — E05.90 SUBCLINICAL HYPERTHYROIDISM: Primary | ICD-10-CM

## 2021-05-24 DIAGNOSIS — T38.0X5A STEROID-INDUCED OSTEOPOROSIS: ICD-10-CM

## 2021-05-24 DIAGNOSIS — E55.9 VITAMIN D DEFICIENCY: ICD-10-CM

## 2021-05-24 DIAGNOSIS — M81.8 STEROID-INDUCED OSTEOPOROSIS: ICD-10-CM

## 2021-05-24 DIAGNOSIS — E04.1 THYROID NODULE: Chronic | ICD-10-CM

## 2021-05-24 PROCEDURE — 99999 PR PBB SHADOW E&M-EST. PATIENT-LVL V: CPT | Mod: PBBFAC,,, | Performed by: INTERNAL MEDICINE

## 2021-05-24 PROCEDURE — 99214 PR OFFICE/OUTPT VISIT, EST, LEVL IV, 30-39 MIN: ICD-10-PCS | Mod: S$GLB,,, | Performed by: INTERNAL MEDICINE

## 2021-05-24 PROCEDURE — 99999 PR PBB SHADOW E&M-EST. PATIENT-LVL V: ICD-10-PCS | Mod: PBBFAC,,, | Performed by: INTERNAL MEDICINE

## 2021-05-24 PROCEDURE — 1126F AMNT PAIN NOTED NONE PRSNT: CPT | Mod: S$GLB,,, | Performed by: INTERNAL MEDICINE

## 2021-05-24 PROCEDURE — 3008F PR BODY MASS INDEX (BMI) DOCUMENTED: ICD-10-PCS | Mod: CPTII,S$GLB,, | Performed by: INTERNAL MEDICINE

## 2021-05-24 PROCEDURE — 99214 OFFICE O/P EST MOD 30 MIN: CPT | Mod: S$GLB,,, | Performed by: INTERNAL MEDICINE

## 2021-05-24 PROCEDURE — 3008F BODY MASS INDEX DOCD: CPT | Mod: CPTII,S$GLB,, | Performed by: INTERNAL MEDICINE

## 2021-05-24 PROCEDURE — 1126F PR PAIN SEVERITY QUANTIFIED, NO PAIN PRESENT: ICD-10-PCS | Mod: S$GLB,,, | Performed by: INTERNAL MEDICINE

## 2021-05-25 ENCOUNTER — SPECIALTY PHARMACY (OUTPATIENT)
Dept: PHARMACY | Facility: CLINIC | Age: 61
End: 2021-05-25

## 2021-05-25 ENCOUNTER — CLINICAL SUPPORT (OUTPATIENT)
Dept: REHABILITATION | Facility: HOSPITAL | Age: 61
End: 2021-05-25
Attending: ORTHOPAEDIC SURGERY
Payer: MEDICARE

## 2021-05-25 ENCOUNTER — OFFICE VISIT (OUTPATIENT)
Dept: RHEUMATOLOGY | Facility: CLINIC | Age: 61
End: 2021-05-25
Payer: MEDICARE

## 2021-05-25 VITALS
HEIGHT: 64 IN | SYSTOLIC BLOOD PRESSURE: 151 MMHG | WEIGHT: 156 LBS | BODY MASS INDEX: 26.63 KG/M2 | HEART RATE: 102 BPM | DIASTOLIC BLOOD PRESSURE: 94 MMHG

## 2021-05-25 DIAGNOSIS — M35.01 SJOGREN'S SYNDROME WITH KERATOCONJUNCTIVITIS SICCA: ICD-10-CM

## 2021-05-25 DIAGNOSIS — T38.0X5A STEROID-INDUCED OSTEOPOROSIS: Chronic | ICD-10-CM

## 2021-05-25 DIAGNOSIS — M79.7 FIBROMYALGIA: ICD-10-CM

## 2021-05-25 DIAGNOSIS — M54.50 LOW BACK PAIN, UNSPECIFIED BACK PAIN LATERALITY, UNSPECIFIED CHRONICITY, UNSPECIFIED WHETHER SCIATICA PRESENT: ICD-10-CM

## 2021-05-25 DIAGNOSIS — M06.9 RHEUMATOID ARTHRITIS INVOLVING MULTIPLE SITES, UNSPECIFIED WHETHER RHEUMATOID FACTOR PRESENT: Chronic | ICD-10-CM

## 2021-05-25 DIAGNOSIS — M06.9 RHEUMATOID ARTHRITIS: ICD-10-CM

## 2021-05-25 DIAGNOSIS — M06.9 RHEUMATOID ARTHRITIS, INVOLVING UNSPECIFIED SITE, UNSPECIFIED WHETHER RHEUMATOID FACTOR PRESENT: ICD-10-CM

## 2021-05-25 DIAGNOSIS — M81.8 STEROID-INDUCED OSTEOPOROSIS: Chronic | ICD-10-CM

## 2021-05-25 DIAGNOSIS — M25.512 ACUTE PAIN OF LEFT SHOULDER: ICD-10-CM

## 2021-05-25 PROCEDURE — 1125F AMNT PAIN NOTED PAIN PRSNT: CPT | Mod: S$GLB,,, | Performed by: INTERNAL MEDICINE

## 2021-05-25 PROCEDURE — 99999 PR PBB SHADOW E&M-EST. PATIENT-LVL III: CPT | Mod: PBBFAC,,, | Performed by: INTERNAL MEDICINE

## 2021-05-25 PROCEDURE — 1125F PR PAIN SEVERITY QUANTIFIED, PAIN PRESENT: ICD-10-PCS | Mod: S$GLB,,, | Performed by: INTERNAL MEDICINE

## 2021-05-25 PROCEDURE — 99214 OFFICE O/P EST MOD 30 MIN: CPT | Mod: S$GLB,,, | Performed by: INTERNAL MEDICINE

## 2021-05-25 PROCEDURE — 3008F PR BODY MASS INDEX (BMI) DOCUMENTED: ICD-10-PCS | Mod: CPTII,S$GLB,, | Performed by: INTERNAL MEDICINE

## 2021-05-25 PROCEDURE — 99214 PR OFFICE/OUTPT VISIT, EST, LEVL IV, 30-39 MIN: ICD-10-PCS | Mod: S$GLB,,, | Performed by: INTERNAL MEDICINE

## 2021-05-25 PROCEDURE — 3008F BODY MASS INDEX DOCD: CPT | Mod: CPTII,S$GLB,, | Performed by: INTERNAL MEDICINE

## 2021-05-25 PROCEDURE — 97110 THERAPEUTIC EXERCISES: CPT

## 2021-05-25 PROCEDURE — 97140 MANUAL THERAPY 1/> REGIONS: CPT

## 2021-05-25 PROCEDURE — 99999 PR PBB SHADOW E&M-EST. PATIENT-LVL III: ICD-10-PCS | Mod: PBBFAC,,, | Performed by: INTERNAL MEDICINE

## 2021-05-25 RX ORDER — SARILUMAB 200 MG/1.14ML
200 INJECTION, SOLUTION SUBCUTANEOUS
Qty: 2.28 ML | Refills: 2 | Status: SHIPPED | OUTPATIENT
Start: 2021-05-25 | End: 2021-08-23 | Stop reason: SDUPTHER

## 2021-05-25 RX ORDER — PREDNISONE 5 MG/1
5 TABLET ORAL DAILY
Qty: 90 TABLET | Refills: 1 | Status: SHIPPED | OUTPATIENT
Start: 2021-05-25 | End: 2021-12-04 | Stop reason: SDUPTHER

## 2021-05-25 RX ORDER — LEFLUNOMIDE 20 MG/1
20 TABLET ORAL DAILY
Qty: 90 TABLET | Refills: 0 | Status: SHIPPED | OUTPATIENT
Start: 2021-05-25 | End: 2021-08-23 | Stop reason: SDUPTHER

## 2021-05-25 ASSESSMENT — ROUTINE ASSESSMENT OF PATIENT INDEX DATA (RAPID3)
FATIGUE SCORE: 6
AM STIFFNESS SCORE: 1, YES
PSYCHOLOGICAL DISTRESS SCORE: 3.3
TOTAL RAPID3 SCORE: 7.11
MDHAQ FUNCTION SCORE: 1.6
PATIENT GLOBAL ASSESSMENT SCORE: 7.5
PAIN SCORE: 8.5
WHEN YOU AWAKENED IN THE MORNING OVER THE LAST WEEK, PLEASE INDICATE THE AMOUNT OF TIME IT TAKES UNTIL YOU ARE AS LIMBER AS YOU WILL BE FOR THE DAY: 24

## 2021-05-28 ENCOUNTER — PATIENT MESSAGE (OUTPATIENT)
Dept: UROLOGY | Facility: CLINIC | Age: 61
End: 2021-05-28

## 2021-05-28 DIAGNOSIS — R35.0 URINARY FREQUENCY: ICD-10-CM

## 2021-05-28 DIAGNOSIS — N39.0 RECURRENT UTI: Primary | ICD-10-CM

## 2021-06-01 ENCOUNTER — CLINICAL SUPPORT (OUTPATIENT)
Dept: REHABILITATION | Facility: HOSPITAL | Age: 61
End: 2021-06-01
Attending: ORTHOPAEDIC SURGERY
Payer: MEDICARE

## 2021-06-01 DIAGNOSIS — M25.512 ACUTE PAIN OF LEFT SHOULDER: ICD-10-CM

## 2021-06-01 PROCEDURE — 97110 THERAPEUTIC EXERCISES: CPT

## 2021-06-01 PROCEDURE — 97140 MANUAL THERAPY 1/> REGIONS: CPT

## 2021-06-04 ENCOUNTER — SPECIALTY PHARMACY (OUTPATIENT)
Dept: PHARMACY | Facility: CLINIC | Age: 61
End: 2021-06-04

## 2021-06-07 DIAGNOSIS — Z00.6 PATIENT IN CLINICAL RESEARCH STUDY: ICD-10-CM

## 2021-06-07 DIAGNOSIS — K31.84 GASTROPARESIS: ICD-10-CM

## 2021-06-08 ENCOUNTER — RESEARCH ENCOUNTER (OUTPATIENT)
Dept: RESEARCH | Facility: HOSPITAL | Age: 61
End: 2021-06-08

## 2021-06-08 ENCOUNTER — HOSPITAL ENCOUNTER (OUTPATIENT)
Dept: CARDIOLOGY | Facility: CLINIC | Age: 61
Discharge: HOME | End: 2021-06-08
Payer: MEDICARE

## 2021-06-08 ENCOUNTER — OFFICE VISIT (OUTPATIENT)
Dept: GASTROENTEROLOGY | Facility: CLINIC | Age: 61
End: 2021-06-08
Payer: MEDICARE

## 2021-06-08 ENCOUNTER — CLINICAL SUPPORT (OUTPATIENT)
Dept: REHABILITATION | Facility: HOSPITAL | Age: 61
End: 2021-06-08
Attending: ORTHOPAEDIC SURGERY
Payer: MEDICARE

## 2021-06-08 VITALS
HEIGHT: 61 IN | SYSTOLIC BLOOD PRESSURE: 130 MMHG | HEART RATE: 111 BPM | WEIGHT: 157.19 LBS | BODY MASS INDEX: 29.68 KG/M2 | DIASTOLIC BLOOD PRESSURE: 84 MMHG

## 2021-06-08 DIAGNOSIS — Z00.6 RESEARCH STUDY PATIENT: ICD-10-CM

## 2021-06-08 DIAGNOSIS — K31.84 GASTROPARESIS: Primary | ICD-10-CM

## 2021-06-08 DIAGNOSIS — M25.512 ACUTE PAIN OF LEFT SHOULDER: ICD-10-CM

## 2021-06-08 DIAGNOSIS — K31.84 GASTROPARESIS: ICD-10-CM

## 2021-06-08 PROCEDURE — 93010 EKG 12-LEAD: ICD-10-PCS | Mod: S$GLB,,, | Performed by: INTERNAL MEDICINE

## 2021-06-08 PROCEDURE — 3008F BODY MASS INDEX DOCD: CPT | Mod: CPTII,S$GLB,, | Performed by: INTERNAL MEDICINE

## 2021-06-08 PROCEDURE — 3008F PR BODY MASS INDEX (BMI) DOCUMENTED: ICD-10-PCS | Mod: CPTII,S$GLB,, | Performed by: INTERNAL MEDICINE

## 2021-06-08 PROCEDURE — 99214 PR OFFICE/OUTPT VISIT, EST, LEVL IV, 30-39 MIN: ICD-10-PCS | Mod: S$GLB,,, | Performed by: INTERNAL MEDICINE

## 2021-06-08 PROCEDURE — 93010 ELECTROCARDIOGRAM REPORT: CPT | Mod: S$GLB,,, | Performed by: INTERNAL MEDICINE

## 2021-06-08 PROCEDURE — 97110 THERAPEUTIC EXERCISES: CPT

## 2021-06-08 PROCEDURE — 99999 PR PBB SHADOW E&M-EST. PATIENT-LVL IV: ICD-10-PCS | Mod: PBBFAC,,, | Performed by: INTERNAL MEDICINE

## 2021-06-08 PROCEDURE — 1126F PR PAIN SEVERITY QUANTIFIED, NO PAIN PRESENT: ICD-10-PCS | Mod: S$GLB,,, | Performed by: INTERNAL MEDICINE

## 2021-06-08 PROCEDURE — 99214 OFFICE O/P EST MOD 30 MIN: CPT | Mod: S$GLB,,, | Performed by: INTERNAL MEDICINE

## 2021-06-08 PROCEDURE — 99999 PR PBB SHADOW E&M-EST. PATIENT-LVL IV: CPT | Mod: PBBFAC,,, | Performed by: INTERNAL MEDICINE

## 2021-06-08 PROCEDURE — 97140 MANUAL THERAPY 1/> REGIONS: CPT

## 2021-06-08 PROCEDURE — 93005 EKG 12-LEAD: ICD-10-PCS | Mod: S$GLB,,, | Performed by: INTERNAL MEDICINE

## 2021-06-08 PROCEDURE — 93005 ELECTROCARDIOGRAM TRACING: CPT | Mod: S$GLB,,, | Performed by: INTERNAL MEDICINE

## 2021-06-08 PROCEDURE — 1126F AMNT PAIN NOTED NONE PRSNT: CPT | Mod: S$GLB,,, | Performed by: INTERNAL MEDICINE

## 2021-06-14 ENCOUNTER — TELEPHONE (OUTPATIENT)
Dept: ORTHOPEDICS | Facility: CLINIC | Age: 61
End: 2021-06-14

## 2021-06-15 ENCOUNTER — CLINICAL SUPPORT (OUTPATIENT)
Dept: REHABILITATION | Facility: HOSPITAL | Age: 61
End: 2021-06-15
Attending: ORTHOPAEDIC SURGERY
Payer: MEDICARE

## 2021-06-15 DIAGNOSIS — M25.512 ACUTE PAIN OF LEFT SHOULDER: ICD-10-CM

## 2021-06-15 PROCEDURE — 97140 MANUAL THERAPY 1/> REGIONS: CPT

## 2021-06-15 PROCEDURE — 97110 THERAPEUTIC EXERCISES: CPT

## 2021-06-17 ENCOUNTER — PATIENT MESSAGE (OUTPATIENT)
Dept: ENDOCRINOLOGY | Facility: CLINIC | Age: 61
End: 2021-06-17

## 2021-06-17 ENCOUNTER — OFFICE VISIT (OUTPATIENT)
Dept: PAIN MEDICINE | Facility: CLINIC | Age: 61
End: 2021-06-17
Payer: MEDICARE

## 2021-06-17 ENCOUNTER — PATIENT MESSAGE (OUTPATIENT)
Dept: GASTROENTEROLOGY | Facility: CLINIC | Age: 61
End: 2021-06-17

## 2021-06-17 DIAGNOSIS — M16.11 OSTEOARTHRITIS OF RIGHT HIP, UNSPECIFIED OSTEOARTHRITIS TYPE: ICD-10-CM

## 2021-06-17 DIAGNOSIS — M54.17 LUMBOSACRAL RADICULOPATHY: Primary | ICD-10-CM

## 2021-06-17 DIAGNOSIS — M47.816 LUMBAR SPONDYLOSIS: ICD-10-CM

## 2021-06-17 DIAGNOSIS — M54.9 DORSALGIA, UNSPECIFIED: ICD-10-CM

## 2021-06-17 PROCEDURE — 99213 PR OFFICE/OUTPT VISIT, EST, LEVL III, 20-29 MIN: ICD-10-PCS | Mod: 95,,, | Performed by: NURSE PRACTITIONER

## 2021-06-17 PROCEDURE — 99213 OFFICE O/P EST LOW 20 MIN: CPT | Mod: 95,,, | Performed by: NURSE PRACTITIONER

## 2021-06-18 ENCOUNTER — SPECIALTY PHARMACY (OUTPATIENT)
Dept: PHARMACY | Facility: CLINIC | Age: 61
End: 2021-06-18

## 2021-06-18 ENCOUNTER — PATIENT MESSAGE (OUTPATIENT)
Dept: PAIN MEDICINE | Facility: CLINIC | Age: 61
End: 2021-06-18

## 2021-06-18 RX ORDER — TRAMADOL HYDROCHLORIDE 50 MG/1
50 TABLET ORAL EVERY 12 HOURS PRN
Qty: 60 TABLET | Refills: 0 | Status: SHIPPED | OUTPATIENT
Start: 2021-06-18 | End: 2021-07-18

## 2021-06-19 DIAGNOSIS — M25.512 LEFT SHOULDER PAIN: Primary | ICD-10-CM

## 2021-06-20 DIAGNOSIS — H04.123 BILATERAL DRY EYES: ICD-10-CM

## 2021-06-21 ENCOUNTER — TELEPHONE (OUTPATIENT)
Dept: ORTHOPEDICS | Facility: CLINIC | Age: 61
End: 2021-06-21

## 2021-06-22 ENCOUNTER — OFFICE VISIT (OUTPATIENT)
Dept: ORTHOPEDICS | Facility: CLINIC | Age: 61
End: 2021-06-22
Payer: MEDICARE

## 2021-06-22 ENCOUNTER — HOSPITAL ENCOUNTER (OUTPATIENT)
Dept: RADIOLOGY | Facility: OTHER | Age: 61
Discharge: HOME | End: 2021-06-22
Attending: ORTHOPAEDIC SURGERY
Payer: MEDICARE

## 2021-06-22 VITALS
WEIGHT: 157 LBS | DIASTOLIC BLOOD PRESSURE: 82 MMHG | SYSTOLIC BLOOD PRESSURE: 141 MMHG | HEART RATE: 118 BPM | BODY MASS INDEX: 29.64 KG/M2 | HEIGHT: 61 IN

## 2021-06-22 DIAGNOSIS — M25.512 LEFT SHOULDER PAIN: ICD-10-CM

## 2021-06-22 DIAGNOSIS — S42.202A CLOSED FRACTURE OF PROXIMAL END OF LEFT HUMERUS, UNSPECIFIED FRACTURE MORPHOLOGY, INITIAL ENCOUNTER: Primary | ICD-10-CM

## 2021-06-22 PROCEDURE — 99999 PR PBB SHADOW E&M-EST. PATIENT-LVL IV: CPT | Mod: PBBFAC,,, | Performed by: ORTHOPAEDIC SURGERY

## 2021-06-22 PROCEDURE — 73030 X-RAY EXAM OF SHOULDER: CPT | Mod: 26,LT,, | Performed by: RADIOLOGY

## 2021-06-22 PROCEDURE — 99999 PR PBB SHADOW E&M-EST. PATIENT-LVL IV: ICD-10-PCS | Mod: PBBFAC,,, | Performed by: ORTHOPAEDIC SURGERY

## 2021-06-22 PROCEDURE — 99213 PR OFFICE/OUTPT VISIT, EST, LEVL III, 20-29 MIN: ICD-10-PCS | Mod: S$GLB,,, | Performed by: ORTHOPAEDIC SURGERY

## 2021-06-22 PROCEDURE — 73030 X-RAY EXAM OF SHOULDER: CPT | Mod: TC,FY,LT

## 2021-06-22 PROCEDURE — 1125F AMNT PAIN NOTED PAIN PRSNT: CPT | Mod: S$GLB,,, | Performed by: ORTHOPAEDIC SURGERY

## 2021-06-22 PROCEDURE — 3008F BODY MASS INDEX DOCD: CPT | Mod: CPTII,S$GLB,, | Performed by: ORTHOPAEDIC SURGERY

## 2021-06-22 PROCEDURE — 73030 XR SHOULDER TRAUMA 3 VIEW LEFT: ICD-10-PCS | Mod: 26,LT,, | Performed by: RADIOLOGY

## 2021-06-22 PROCEDURE — 99213 OFFICE O/P EST LOW 20 MIN: CPT | Mod: S$GLB,,, | Performed by: ORTHOPAEDIC SURGERY

## 2021-06-22 PROCEDURE — 3008F PR BODY MASS INDEX (BMI) DOCUMENTED: ICD-10-PCS | Mod: CPTII,S$GLB,, | Performed by: ORTHOPAEDIC SURGERY

## 2021-06-22 PROCEDURE — 1125F PR PAIN SEVERITY QUANTIFIED, PAIN PRESENT: ICD-10-PCS | Mod: S$GLB,,, | Performed by: ORTHOPAEDIC SURGERY

## 2021-06-29 ENCOUNTER — PATIENT MESSAGE (OUTPATIENT)
Dept: PAIN MEDICINE | Facility: CLINIC | Age: 61
End: 2021-06-29

## 2021-07-06 ENCOUNTER — SPECIALTY PHARMACY (OUTPATIENT)
Dept: PHARMACY | Facility: CLINIC | Age: 61
End: 2021-07-06

## 2021-07-08 ENCOUNTER — PATIENT MESSAGE (OUTPATIENT)
Dept: RHEUMATOLOGY | Facility: CLINIC | Age: 61
End: 2021-07-08

## 2021-07-08 RX ORDER — CYCLOBENZAPRINE HCL 5 MG
10 TABLET ORAL NIGHTLY PRN
Qty: 30 TABLET | Refills: 1 | Status: SHIPPED | OUTPATIENT
Start: 2021-07-08 | End: 2021-07-08

## 2021-07-08 RX ORDER — CYCLOBENZAPRINE HCL 5 MG
5 TABLET ORAL NIGHTLY PRN
Qty: 30 TABLET | Refills: 1 | Status: SHIPPED | OUTPATIENT
Start: 2021-07-08 | End: 2021-08-23 | Stop reason: SDUPTHER

## 2021-07-12 ENCOUNTER — SPECIALTY PHARMACY (OUTPATIENT)
Dept: PHARMACY | Facility: CLINIC | Age: 61
End: 2021-07-12

## 2021-07-21 ENCOUNTER — TELEPHONE (OUTPATIENT)
Dept: RHEUMATOLOGY | Facility: CLINIC | Age: 61
End: 2021-07-21

## 2021-07-21 ENCOUNTER — PATIENT MESSAGE (OUTPATIENT)
Dept: HEMATOLOGY/ONCOLOGY | Facility: CLINIC | Age: 61
End: 2021-07-21

## 2021-07-21 DIAGNOSIS — E78.5 HYPERLIPIDEMIA, UNSPECIFIED HYPERLIPIDEMIA TYPE: Primary | ICD-10-CM

## 2021-07-21 DIAGNOSIS — D50.8 OTHER IRON DEFICIENCY ANEMIA: Primary | ICD-10-CM

## 2021-07-23 ENCOUNTER — HOSPITAL ENCOUNTER (OUTPATIENT)
Facility: OTHER | Age: 61
Discharge: HOME | End: 2021-07-23
Attending: ANESTHESIOLOGY | Admitting: ANESTHESIOLOGY
Payer: MEDICARE

## 2021-07-23 VITALS
HEART RATE: 84 BPM | OXYGEN SATURATION: 93 % | BODY MASS INDEX: 28.32 KG/M2 | SYSTOLIC BLOOD PRESSURE: 125 MMHG | DIASTOLIC BLOOD PRESSURE: 68 MMHG | RESPIRATION RATE: 16 BRPM | TEMPERATURE: 99 F | WEIGHT: 150 LBS | HEIGHT: 61 IN

## 2021-07-23 DIAGNOSIS — G89.29 CHRONIC PAIN: ICD-10-CM

## 2021-07-23 DIAGNOSIS — M54.16 LUMBAR RADICULOPATHY: ICD-10-CM

## 2021-07-23 PROCEDURE — A4216 STERILE WATER/SALINE, 10 ML: HCPCS | Performed by: ANESTHESIOLOGY

## 2021-07-23 PROCEDURE — 25000003 PHARM REV CODE 250: Performed by: ANESTHESIOLOGY

## 2021-07-23 PROCEDURE — 64483 PR EPIDURAL INJ, ANES/STEROID, TRANSFORAMINAL, LUMB/SACR, SNGL LEVL: ICD-10-PCS | Mod: 50,,, | Performed by: ANESTHESIOLOGY

## 2021-07-23 PROCEDURE — 64483 NJX AA&/STRD TFRM EPI L/S 1: CPT | Mod: 50,,, | Performed by: ANESTHESIOLOGY

## 2021-07-23 PROCEDURE — 64483 NJX AA&/STRD TFRM EPI L/S 1: CPT | Mod: 50 | Performed by: ANESTHESIOLOGY

## 2021-07-23 PROCEDURE — 25500020 PHARM REV CODE 255: Performed by: ANESTHESIOLOGY

## 2021-07-23 PROCEDURE — 63600175 PHARM REV CODE 636 W HCPCS: Performed by: ANESTHESIOLOGY

## 2021-07-23 RX ORDER — DIPHENHYDRAMINE HYDROCHLORIDE 50 MG/ML
25 INJECTION INTRAMUSCULAR; INTRAVENOUS ONCE
Status: COMPLETED | OUTPATIENT
Start: 2021-07-23 | End: 2021-07-23

## 2021-07-23 RX ORDER — LIDOCAINE HYDROCHLORIDE 10 MG/ML
INJECTION INFILTRATION; PERINEURAL
Status: DISCONTINUED | OUTPATIENT
Start: 2021-07-23 | End: 2021-07-23 | Stop reason: HOSPADM

## 2021-07-23 RX ORDER — FENTANYL CITRATE 50 UG/ML
INJECTION, SOLUTION INTRAMUSCULAR; INTRAVENOUS
Status: DISCONTINUED | OUTPATIENT
Start: 2021-07-23 | End: 2021-07-23 | Stop reason: HOSPADM

## 2021-07-23 RX ORDER — SODIUM CHLORIDE 9 MG/ML
INJECTION, SOLUTION INTRAMUSCULAR; INTRAVENOUS; SUBCUTANEOUS
Status: DISCONTINUED | OUTPATIENT
Start: 2021-07-23 | End: 2021-07-23 | Stop reason: HOSPADM

## 2021-07-23 RX ORDER — SODIUM CHLORIDE 9 MG/ML
INJECTION, SOLUTION INTRAVENOUS CONTINUOUS
Status: DISCONTINUED | OUTPATIENT
Start: 2021-07-23 | End: 2021-07-23 | Stop reason: HOSPADM

## 2021-07-23 RX ORDER — MIDAZOLAM HYDROCHLORIDE 1 MG/ML
INJECTION INTRAMUSCULAR; INTRAVENOUS
Status: DISCONTINUED | OUTPATIENT
Start: 2021-07-23 | End: 2021-07-23 | Stop reason: HOSPADM

## 2021-07-23 RX ORDER — LIDOCAINE HYDROCHLORIDE 10 MG/ML
INJECTION, SOLUTION EPIDURAL; INFILTRATION; INTRACAUDAL; PERINEURAL
Status: DISCONTINUED | OUTPATIENT
Start: 2021-07-23 | End: 2021-07-23 | Stop reason: HOSPADM

## 2021-07-23 RX ADMIN — DIPHENHYDRAMINE HYDROCHLORIDE 25 MG: 50 INJECTION INTRAMUSCULAR; INTRAVENOUS at 07:07

## 2021-07-23 RX ADMIN — SODIUM CHLORIDE: 0.9 INJECTION, SOLUTION INTRAVENOUS at 07:07

## 2021-07-26 ENCOUNTER — PATIENT MESSAGE (OUTPATIENT)
Dept: HEPATOLOGY | Facility: CLINIC | Age: 61
End: 2021-07-26

## 2021-07-26 ENCOUNTER — PATIENT MESSAGE (OUTPATIENT)
Dept: ENDOCRINOLOGY | Facility: CLINIC | Age: 61
End: 2021-07-26

## 2021-07-27 ENCOUNTER — TELEPHONE (OUTPATIENT)
Dept: HEPATOLOGY | Facility: CLINIC | Age: 61
End: 2021-07-27

## 2021-07-27 ENCOUNTER — PATIENT MESSAGE (OUTPATIENT)
Dept: PAIN MEDICINE | Facility: CLINIC | Age: 61
End: 2021-07-27

## 2021-07-27 DIAGNOSIS — Z79.631 LONG TERM METHOTREXATE USER: ICD-10-CM

## 2021-07-27 DIAGNOSIS — R10.11 RUQ PAIN: Primary | ICD-10-CM

## 2021-07-27 DIAGNOSIS — R74.8 ELEVATED SERUM GGT LEVEL: ICD-10-CM

## 2021-07-27 DIAGNOSIS — R74.01 TRANSAMINASEMIA: ICD-10-CM

## 2021-08-02 ENCOUNTER — HOSPITAL ENCOUNTER (OUTPATIENT)
Dept: RADIOLOGY | Facility: OTHER | Age: 61
Discharge: HOME | End: 2021-08-02
Attending: NURSE PRACTITIONER
Payer: MEDICARE

## 2021-08-02 DIAGNOSIS — Z79.631 LONG TERM METHOTREXATE USER: ICD-10-CM

## 2021-08-02 DIAGNOSIS — R10.11 RUQ PAIN: ICD-10-CM

## 2021-08-02 DIAGNOSIS — R74.01 TRANSAMINASEMIA: ICD-10-CM

## 2021-08-02 DIAGNOSIS — R74.8 ELEVATED SERUM GGT LEVEL: ICD-10-CM

## 2021-08-02 PROCEDURE — 76700 US EXAM ABDOM COMPLETE: CPT | Mod: 26,,, | Performed by: RADIOLOGY

## 2021-08-02 PROCEDURE — 76700 US ABDOMEN COMPLETE: ICD-10-PCS | Mod: 26,,, | Performed by: RADIOLOGY

## 2021-08-02 PROCEDURE — 76700 US EXAM ABDOM COMPLETE: CPT | Mod: TC

## 2021-08-03 ENCOUNTER — PATIENT MESSAGE (OUTPATIENT)
Dept: PAIN MEDICINE | Facility: CLINIC | Age: 61
End: 2021-08-03

## 2021-08-06 ENCOUNTER — PATIENT MESSAGE (OUTPATIENT)
Dept: UROLOGY | Facility: CLINIC | Age: 61
End: 2021-08-06

## 2021-08-06 DIAGNOSIS — R30.0 DYSURIA: Primary | ICD-10-CM

## 2021-08-09 ENCOUNTER — SPECIALTY PHARMACY (OUTPATIENT)
Dept: PHARMACY | Facility: CLINIC | Age: 61
End: 2021-08-09

## 2021-08-09 ENCOUNTER — PATIENT MESSAGE (OUTPATIENT)
Dept: UROLOGY | Facility: CLINIC | Age: 61
End: 2021-08-09

## 2021-08-09 ENCOUNTER — PATIENT MESSAGE (OUTPATIENT)
Dept: GASTROENTEROLOGY | Facility: CLINIC | Age: 61
End: 2021-08-09

## 2021-08-10 ENCOUNTER — OFFICE VISIT (OUTPATIENT)
Dept: SPINE | Facility: CLINIC | Age: 61
End: 2021-08-10
Attending: ANESTHESIOLOGY
Payer: MEDICARE

## 2021-08-10 ENCOUNTER — PATIENT MESSAGE (OUTPATIENT)
Dept: SPINE | Facility: CLINIC | Age: 61
End: 2021-08-10

## 2021-08-10 VITALS
SYSTOLIC BLOOD PRESSURE: 136 MMHG | BODY MASS INDEX: 28.31 KG/M2 | HEIGHT: 61 IN | WEIGHT: 149.94 LBS | DIASTOLIC BLOOD PRESSURE: 87 MMHG | HEART RATE: 112 BPM | TEMPERATURE: 99 F

## 2021-08-10 DIAGNOSIS — M47.26 OSTEOARTHRITIS OF SPINE WITH RADICULOPATHY, LUMBAR REGION: Primary | ICD-10-CM

## 2021-08-10 DIAGNOSIS — M47.816 FACET ARTHRITIS, DEGENERATIVE, LUMBAR SPINE: ICD-10-CM

## 2021-08-10 DIAGNOSIS — M47.816 SPONDYLOSIS OF LUMBAR REGION WITHOUT MYELOPATHY OR RADICULOPATHY: ICD-10-CM

## 2021-08-10 PROCEDURE — 3079F DIAST BP 80-89 MM HG: CPT | Mod: CPTII,S$GLB,, | Performed by: ANESTHESIOLOGY

## 2021-08-10 PROCEDURE — 1160F RVW MEDS BY RX/DR IN RCRD: CPT | Mod: CPTII,S$GLB,, | Performed by: ANESTHESIOLOGY

## 2021-08-10 PROCEDURE — 99999 PR PBB SHADOW E&M-EST. PATIENT-LVL V: CPT | Mod: PBBFAC,,, | Performed by: ANESTHESIOLOGY

## 2021-08-10 PROCEDURE — 99214 OFFICE O/P EST MOD 30 MIN: CPT | Mod: S$GLB,,, | Performed by: ANESTHESIOLOGY

## 2021-08-10 PROCEDURE — 1160F PR REVIEW ALL MEDS BY PRESCRIBER/CLIN PHARMACIST DOCUMENTED: ICD-10-PCS | Mod: CPTII,S$GLB,, | Performed by: ANESTHESIOLOGY

## 2021-08-10 PROCEDURE — 1125F PR PAIN SEVERITY QUANTIFIED, PAIN PRESENT: ICD-10-PCS | Mod: CPTII,S$GLB,, | Performed by: ANESTHESIOLOGY

## 2021-08-10 PROCEDURE — 99999 PR PBB SHADOW E&M-EST. PATIENT-LVL V: ICD-10-PCS | Mod: PBBFAC,,, | Performed by: ANESTHESIOLOGY

## 2021-08-10 PROCEDURE — 3079F PR MOST RECENT DIASTOLIC BLOOD PRESSURE 80-89 MM HG: ICD-10-PCS | Mod: CPTII,S$GLB,, | Performed by: ANESTHESIOLOGY

## 2021-08-10 PROCEDURE — 3075F PR MOST RECENT SYSTOLIC BLOOD PRESS GE 130-139MM HG: ICD-10-PCS | Mod: CPTII,S$GLB,, | Performed by: ANESTHESIOLOGY

## 2021-08-10 PROCEDURE — 1125F AMNT PAIN NOTED PAIN PRSNT: CPT | Mod: CPTII,S$GLB,, | Performed by: ANESTHESIOLOGY

## 2021-08-10 PROCEDURE — 1159F PR MEDICATION LIST DOCUMENTED IN MEDICAL RECORD: ICD-10-PCS | Mod: CPTII,S$GLB,, | Performed by: ANESTHESIOLOGY

## 2021-08-10 PROCEDURE — 3075F SYST BP GE 130 - 139MM HG: CPT | Mod: CPTII,S$GLB,, | Performed by: ANESTHESIOLOGY

## 2021-08-10 PROCEDURE — 3008F BODY MASS INDEX DOCD: CPT | Mod: CPTII,S$GLB,, | Performed by: ANESTHESIOLOGY

## 2021-08-10 PROCEDURE — 99214 PR OFFICE/OUTPT VISIT, EST, LEVL IV, 30-39 MIN: ICD-10-PCS | Mod: S$GLB,,, | Performed by: ANESTHESIOLOGY

## 2021-08-10 PROCEDURE — 3008F PR BODY MASS INDEX (BMI) DOCUMENTED: ICD-10-PCS | Mod: CPTII,S$GLB,, | Performed by: ANESTHESIOLOGY

## 2021-08-10 PROCEDURE — 1159F MED LIST DOCD IN RCRD: CPT | Mod: CPTII,S$GLB,, | Performed by: ANESTHESIOLOGY

## 2021-08-11 RX ORDER — OMEPRAZOLE 40 MG/1
40 CAPSULE, DELAYED RELEASE ORAL EVERY MORNING
Qty: 30 CAPSULE | Refills: 6 | Status: SHIPPED | OUTPATIENT
Start: 2021-08-11 | End: 2021-11-09 | Stop reason: SDUPTHER

## 2021-08-11 RX ORDER — TRAMADOL HYDROCHLORIDE 50 MG/1
50 TABLET ORAL EVERY 12 HOURS PRN
Qty: 60 TABLET | Refills: 0 | Status: SHIPPED | OUTPATIENT
Start: 2021-08-11 | End: 2021-09-24 | Stop reason: SDUPTHER

## 2021-08-13 ENCOUNTER — TELEPHONE (OUTPATIENT)
Dept: UROLOGY | Facility: CLINIC | Age: 61
End: 2021-08-13

## 2021-08-16 ENCOUNTER — PATIENT MESSAGE (OUTPATIENT)
Dept: RHEUMATOLOGY | Facility: CLINIC | Age: 61
End: 2021-08-16

## 2021-08-19 ENCOUNTER — TELEPHONE (OUTPATIENT)
Dept: RHEUMATOLOGY | Facility: CLINIC | Age: 61
End: 2021-08-19

## 2021-08-19 DIAGNOSIS — D70.8 OTHER NEUTROPENIA: Primary | ICD-10-CM

## 2021-08-20 ENCOUNTER — PATIENT MESSAGE (OUTPATIENT)
Dept: PAIN MEDICINE | Facility: OTHER | Age: 61
End: 2021-08-20

## 2021-08-23 ENCOUNTER — PATIENT MESSAGE (OUTPATIENT)
Dept: PAIN MEDICINE | Facility: OTHER | Age: 61
End: 2021-08-23

## 2021-08-23 ENCOUNTER — HOSPITAL ENCOUNTER (OUTPATIENT)
Facility: OTHER | Age: 61
Discharge: HOME | End: 2021-08-23
Attending: ANESTHESIOLOGY | Admitting: ANESTHESIOLOGY
Payer: MEDICARE

## 2021-08-23 ENCOUNTER — OFFICE VISIT (OUTPATIENT)
Dept: RHEUMATOLOGY | Facility: CLINIC | Age: 61
End: 2021-08-23
Payer: MEDICARE

## 2021-08-23 VITALS
DIASTOLIC BLOOD PRESSURE: 80 MMHG | BODY MASS INDEX: 29.63 KG/M2 | HEIGHT: 61 IN | HEART RATE: 98 BPM | SYSTOLIC BLOOD PRESSURE: 114 MMHG | WEIGHT: 156.94 LBS

## 2021-08-23 VITALS
BODY MASS INDEX: 28.7 KG/M2 | HEART RATE: 90 BPM | HEIGHT: 61 IN | TEMPERATURE: 99 F | DIASTOLIC BLOOD PRESSURE: 81 MMHG | SYSTOLIC BLOOD PRESSURE: 147 MMHG | RESPIRATION RATE: 16 BRPM | WEIGHT: 152 LBS | OXYGEN SATURATION: 99 %

## 2021-08-23 DIAGNOSIS — M47.819 SPONDYLOSIS WITHOUT MYELOPATHY: ICD-10-CM

## 2021-08-23 DIAGNOSIS — M06.9 RHEUMATOID ARTHRITIS, INVOLVING UNSPECIFIED SITE, UNSPECIFIED WHETHER RHEUMATOID FACTOR PRESENT: ICD-10-CM

## 2021-08-23 DIAGNOSIS — M47.816 OSTEOARTHRITIS OF LUMBAR SPINE, UNSPECIFIED SPINAL OSTEOARTHRITIS COMPLICATION STATUS: Primary | ICD-10-CM

## 2021-08-23 DIAGNOSIS — M54.16 LUMBAR RADICULOPATHY: ICD-10-CM

## 2021-08-23 DIAGNOSIS — M47.816 OSTEOARTHRITIS OF LUMBAR SPINE: ICD-10-CM

## 2021-08-23 DIAGNOSIS — M06.9 RHEUMATOID ARTHRITIS INVOLVING MULTIPLE SITES, UNSPECIFIED WHETHER RHEUMATOID FACTOR PRESENT: Chronic | ICD-10-CM

## 2021-08-23 DIAGNOSIS — M06.9 RHEUMATOID ARTHRITIS: ICD-10-CM

## 2021-08-23 DIAGNOSIS — M79.7 FIBROMYALGIA: ICD-10-CM

## 2021-08-23 DIAGNOSIS — M81.8 STEROID-INDUCED OSTEOPOROSIS: Chronic | ICD-10-CM

## 2021-08-23 DIAGNOSIS — H04.123 DRY EYES: ICD-10-CM

## 2021-08-23 DIAGNOSIS — T38.0X5A STEROID-INDUCED OSTEOPOROSIS: Chronic | ICD-10-CM

## 2021-08-23 PROCEDURE — 64494 INJ PARAVERT F JNT L/S 2 LEV: CPT | Mod: 50 | Performed by: ANESTHESIOLOGY

## 2021-08-23 PROCEDURE — 64493 INJ PARAVERT F JNT L/S 1 LEV: CPT | Mod: 50 | Performed by: ANESTHESIOLOGY

## 2021-08-23 PROCEDURE — 64494 PR INJ DX/THER AGNT PARAVERT FACET JOINT,IMG GUIDE,LUMBAR/SAC, 2ND LEVEL: ICD-10-PCS | Mod: 50,,, | Performed by: ANESTHESIOLOGY

## 2021-08-23 PROCEDURE — 1159F MED LIST DOCD IN RCRD: CPT | Mod: CPTII,S$GLB,, | Performed by: INTERNAL MEDICINE

## 2021-08-23 PROCEDURE — 64493 INJ PARAVERT F JNT L/S 1 LEV: CPT | Mod: 50,,, | Performed by: ANESTHESIOLOGY

## 2021-08-23 PROCEDURE — 25000003 PHARM REV CODE 250: Performed by: ANESTHESIOLOGY

## 2021-08-23 PROCEDURE — 3079F PR MOST RECENT DIASTOLIC BLOOD PRESSURE 80-89 MM HG: ICD-10-PCS | Mod: CPTII,S$GLB,, | Performed by: INTERNAL MEDICINE

## 2021-08-23 PROCEDURE — 99999 PR PBB SHADOW E&M-EST. PATIENT-LVL V: ICD-10-PCS | Mod: PBBFAC,,, | Performed by: INTERNAL MEDICINE

## 2021-08-23 PROCEDURE — 64493 PR INJ DX/THER AGNT PARAVERT FACET JOINT,IMG GUIDE,LUMBAR/SAC,1ST LVL: ICD-10-PCS | Mod: 50,,, | Performed by: ANESTHESIOLOGY

## 2021-08-23 PROCEDURE — 3074F PR MOST RECENT SYSTOLIC BLOOD PRESSURE < 130 MM HG: ICD-10-PCS | Mod: CPTII,S$GLB,, | Performed by: INTERNAL MEDICINE

## 2021-08-23 PROCEDURE — 99999 PR PBB SHADOW E&M-EST. PATIENT-LVL V: CPT | Mod: PBBFAC,,, | Performed by: INTERNAL MEDICINE

## 2021-08-23 PROCEDURE — 1125F AMNT PAIN NOTED PAIN PRSNT: CPT | Mod: CPTII,S$GLB,, | Performed by: INTERNAL MEDICINE

## 2021-08-23 PROCEDURE — 64494 INJ PARAVERT F JNT L/S 2 LEV: CPT | Mod: 50,,, | Performed by: ANESTHESIOLOGY

## 2021-08-23 PROCEDURE — 3079F DIAST BP 80-89 MM HG: CPT | Mod: CPTII,S$GLB,, | Performed by: INTERNAL MEDICINE

## 2021-08-23 PROCEDURE — 3008F BODY MASS INDEX DOCD: CPT | Mod: CPTII,S$GLB,, | Performed by: INTERNAL MEDICINE

## 2021-08-23 PROCEDURE — 1159F PR MEDICATION LIST DOCUMENTED IN MEDICAL RECORD: ICD-10-PCS | Mod: CPTII,S$GLB,, | Performed by: INTERNAL MEDICINE

## 2021-08-23 PROCEDURE — 3008F PR BODY MASS INDEX (BMI) DOCUMENTED: ICD-10-PCS | Mod: CPTII,S$GLB,, | Performed by: INTERNAL MEDICINE

## 2021-08-23 PROCEDURE — 99213 PR OFFICE/OUTPT VISIT, EST, LEVL III, 20-29 MIN: ICD-10-PCS | Mod: S$GLB,,, | Performed by: INTERNAL MEDICINE

## 2021-08-23 PROCEDURE — 1125F PR PAIN SEVERITY QUANTIFIED, PAIN PRESENT: ICD-10-PCS | Mod: CPTII,S$GLB,, | Performed by: INTERNAL MEDICINE

## 2021-08-23 PROCEDURE — 99213 OFFICE O/P EST LOW 20 MIN: CPT | Mod: S$GLB,,, | Performed by: INTERNAL MEDICINE

## 2021-08-23 PROCEDURE — 3074F SYST BP LT 130 MM HG: CPT | Mod: CPTII,S$GLB,, | Performed by: INTERNAL MEDICINE

## 2021-08-23 RX ORDER — CYCLOBENZAPRINE HCL 5 MG
5 TABLET ORAL NIGHTLY PRN
Qty: 90 TABLET | Refills: 1 | Status: SHIPPED | OUTPATIENT
Start: 2021-08-23 | End: 2022-03-23

## 2021-08-23 RX ORDER — SARILUMAB 200 MG/1.14ML
200 INJECTION, SOLUTION SUBCUTANEOUS
Qty: 2.28 ML | Refills: 2 | Status: SHIPPED | OUTPATIENT
Start: 2021-08-23 | End: 2021-11-09 | Stop reason: SDUPTHER

## 2021-08-23 RX ORDER — LEFLUNOMIDE 20 MG/1
20 TABLET ORAL DAILY
Qty: 90 TABLET | Refills: 0 | Status: SHIPPED | OUTPATIENT
Start: 2021-08-23 | End: 2021-11-16

## 2021-08-23 RX ORDER — FLUCONAZOLE 100 MG/1
TABLET ORAL
COMMUNITY
Start: 2021-07-26 | End: 2022-02-01

## 2021-08-23 RX ORDER — ALPRAZOLAM 0.5 MG/1
TABLET ORAL
Qty: 1 TABLET | Refills: 0 | Status: SHIPPED | OUTPATIENT
Start: 2021-08-23 | End: 2021-12-08

## 2021-08-23 RX ORDER — BUPIVACAINE HYDROCHLORIDE 2.5 MG/ML
INJECTION, SOLUTION EPIDURAL; INFILTRATION; INTRACAUDAL
Status: DISCONTINUED | OUTPATIENT
Start: 2021-08-23 | End: 2021-08-23 | Stop reason: HOSPADM

## 2021-08-23 RX ORDER — SODIUM CHLORIDE 9 MG/ML
INJECTION, SOLUTION INTRAVENOUS CONTINUOUS
Status: DISCONTINUED | OUTPATIENT
Start: 2021-08-23 | End: 2021-08-23 | Stop reason: HOSPADM

## 2021-08-23 RX ORDER — PREDNISONE 1 MG/1
4 TABLET ORAL DAILY
Qty: 360 TABLET | Refills: 1 | Status: SHIPPED | OUTPATIENT
Start: 2021-08-23 | End: 2021-12-09

## 2021-08-23 RX ORDER — LIDOCAINE HYDROCHLORIDE 10 MG/ML
INJECTION INFILTRATION; PERINEURAL
Status: DISCONTINUED | OUTPATIENT
Start: 2021-08-23 | End: 2021-08-23 | Stop reason: HOSPADM

## 2021-08-23 RX ORDER — DICLOFENAC SODIUM 10 MG/G
GEL TOPICAL
Qty: 3 TUBE | Refills: 2 | Status: SHIPPED | OUTPATIENT
Start: 2021-08-23 | End: 2022-01-12

## 2021-08-23 ASSESSMENT — ROUTINE ASSESSMENT OF PATIENT INDEX DATA (RAPID3)
FATIGUE SCORE: 8
MDHAQ FUNCTION SCORE: 2
PATIENT GLOBAL ASSESSMENT SCORE: 8
PAIN SCORE: 8.5
TOTAL RAPID3 SCORE: 7.72
PSYCHOLOGICAL DISTRESS SCORE: 2.2
AM STIFFNESS SCORE: 1, YES
WHEN YOU AWAKENED IN THE MORNING OVER THE LAST WEEK, PLEASE INDICATE THE AMOUNT OF TIME IT TAKES UNTIL YOU ARE AS LIMBER AS YOU WILL BE FOR THE DAY: 24 HOURS

## 2021-08-25 ENCOUNTER — PATIENT MESSAGE (OUTPATIENT)
Dept: GASTROENTEROLOGY | Facility: CLINIC | Age: 61
End: 2021-08-25

## 2021-08-26 ENCOUNTER — HOSPITAL ENCOUNTER (OUTPATIENT)
Facility: OTHER | Age: 61
Discharge: HOME | End: 2021-08-26
Attending: ANESTHESIOLOGY | Admitting: ANESTHESIOLOGY
Payer: MEDICARE

## 2021-08-26 ENCOUNTER — PATIENT MESSAGE (OUTPATIENT)
Dept: RHEUMATOLOGY | Facility: CLINIC | Age: 61
End: 2021-08-26

## 2021-08-26 ENCOUNTER — TELEPHONE (OUTPATIENT)
Dept: RHEUMATOLOGY | Facility: CLINIC | Age: 61
End: 2021-08-26

## 2021-08-26 VITALS
HEIGHT: 61 IN | HEART RATE: 90 BPM | BODY MASS INDEX: 28.7 KG/M2 | OXYGEN SATURATION: 94 % | RESPIRATION RATE: 18 BRPM | WEIGHT: 152 LBS | SYSTOLIC BLOOD PRESSURE: 122 MMHG | TEMPERATURE: 99 F | DIASTOLIC BLOOD PRESSURE: 84 MMHG

## 2021-08-26 DIAGNOSIS — M47.9 OSTEOARTHRITIS OF SPINE, UNSPECIFIED SPINAL OSTEOARTHRITIS COMPLICATION STATUS, UNSPECIFIED SPINAL REGION: Primary | ICD-10-CM

## 2021-08-26 DIAGNOSIS — M47.819 SPONDYLOSIS WITHOUT MYELOPATHY: ICD-10-CM

## 2021-08-26 DIAGNOSIS — D70.9 NEUTROPENIA, UNSPECIFIED TYPE: Primary | ICD-10-CM

## 2021-08-26 DIAGNOSIS — G89.29 CHRONIC PAIN: ICD-10-CM

## 2021-08-26 PROCEDURE — 64493 PR INJ DX/THER AGNT PARAVERT FACET JOINT,IMG GUIDE,LUMBAR/SAC,1ST LVL: ICD-10-PCS | Mod: 50,,, | Performed by: ANESTHESIOLOGY

## 2021-08-26 PROCEDURE — 64494 PR INJ DX/THER AGNT PARAVERT FACET JOINT,IMG GUIDE,LUMBAR/SAC, 2ND LEVEL: ICD-10-PCS | Mod: 50,,, | Performed by: ANESTHESIOLOGY

## 2021-08-26 PROCEDURE — 64494 INJ PARAVERT F JNT L/S 2 LEV: CPT | Mod: 50 | Performed by: ANESTHESIOLOGY

## 2021-08-26 PROCEDURE — 64494 INJ PARAVERT F JNT L/S 2 LEV: CPT | Mod: 50,,, | Performed by: ANESTHESIOLOGY

## 2021-08-26 PROCEDURE — 64493 INJ PARAVERT F JNT L/S 1 LEV: CPT | Mod: 50 | Performed by: ANESTHESIOLOGY

## 2021-08-26 PROCEDURE — 64493 INJ PARAVERT F JNT L/S 1 LEV: CPT | Mod: 50,,, | Performed by: ANESTHESIOLOGY

## 2021-08-26 PROCEDURE — 25000003 PHARM REV CODE 250: Performed by: ANESTHESIOLOGY

## 2021-08-26 RX ORDER — SODIUM CHLORIDE 9 MG/ML
INJECTION, SOLUTION INTRAVENOUS CONTINUOUS
Status: DISCONTINUED | OUTPATIENT
Start: 2021-08-26 | End: 2021-08-26 | Stop reason: HOSPADM

## 2021-08-26 RX ORDER — LIDOCAINE HYDROCHLORIDE 10 MG/ML
INJECTION INFILTRATION; PERINEURAL
Status: DISCONTINUED | OUTPATIENT
Start: 2021-08-26 | End: 2021-08-26 | Stop reason: HOSPADM

## 2021-08-26 RX ORDER — BUPIVACAINE HYDROCHLORIDE 2.5 MG/ML
INJECTION, SOLUTION EPIDURAL; INFILTRATION; INTRACAUDAL
Status: DISCONTINUED | OUTPATIENT
Start: 2021-08-26 | End: 2021-08-26 | Stop reason: HOSPADM

## 2021-08-31 ENCOUNTER — PATIENT MESSAGE (OUTPATIENT)
Dept: SPINE | Facility: CLINIC | Age: 61
End: 2021-08-31

## 2021-08-31 ENCOUNTER — PATIENT OUTREACH (OUTPATIENT)
Dept: ADMINISTRATIVE | Facility: OTHER | Age: 61
End: 2021-08-31

## 2021-09-09 ENCOUNTER — SPECIALTY PHARMACY (OUTPATIENT)
Dept: PHARMACY | Facility: CLINIC | Age: 61
End: 2021-09-09

## 2021-09-13 ENCOUNTER — PATIENT MESSAGE (OUTPATIENT)
Dept: UROLOGY | Facility: CLINIC | Age: 61
End: 2021-09-13

## 2021-09-14 ENCOUNTER — PATIENT MESSAGE (OUTPATIENT)
Dept: PAIN MEDICINE | Facility: CLINIC | Age: 61
End: 2021-09-14

## 2021-09-14 ENCOUNTER — TELEPHONE (OUTPATIENT)
Dept: UROLOGY | Facility: CLINIC | Age: 61
End: 2021-09-14

## 2021-09-14 DIAGNOSIS — M47.816 LUMBAR SPONDYLOSIS: Primary | ICD-10-CM

## 2021-09-15 ENCOUNTER — TELEPHONE (OUTPATIENT)
Dept: ORTHOPEDICS | Facility: CLINIC | Age: 61
End: 2021-09-15

## 2021-09-16 ENCOUNTER — PATIENT MESSAGE (OUTPATIENT)
Dept: GASTROENTEROLOGY | Facility: CLINIC | Age: 61
End: 2021-09-16

## 2021-09-16 ENCOUNTER — PATIENT MESSAGE (OUTPATIENT)
Dept: OPHTHALMOLOGY | Facility: CLINIC | Age: 61
End: 2021-09-16

## 2021-09-16 ENCOUNTER — PATIENT MESSAGE (OUTPATIENT)
Dept: OPTOMETRY | Facility: CLINIC | Age: 61
End: 2021-09-16

## 2021-09-16 ENCOUNTER — OFFICE VISIT (OUTPATIENT)
Dept: ORTHOPEDICS | Facility: CLINIC | Age: 61
End: 2021-09-16
Payer: MEDICARE

## 2021-09-16 ENCOUNTER — HOSPITAL ENCOUNTER (OUTPATIENT)
Dept: RADIOLOGY | Facility: OTHER | Age: 61
Discharge: HOME | End: 2021-09-16
Attending: PHYSICIAN ASSISTANT
Payer: MEDICARE

## 2021-09-16 VITALS
DIASTOLIC BLOOD PRESSURE: 79 MMHG | BODY MASS INDEX: 28.7 KG/M2 | SYSTOLIC BLOOD PRESSURE: 118 MMHG | HEART RATE: 94 BPM | HEIGHT: 61 IN | WEIGHT: 152 LBS

## 2021-09-16 DIAGNOSIS — M54.9 UPPER BACK PAIN ON LEFT SIDE: ICD-10-CM

## 2021-09-16 DIAGNOSIS — H04.123 BILATERAL DRY EYES: ICD-10-CM

## 2021-09-16 DIAGNOSIS — K31.84 GASTROPARESIS: ICD-10-CM

## 2021-09-16 DIAGNOSIS — S42.202A CLOSED FRACTURE OF PROXIMAL END OF LEFT HUMERUS, UNSPECIFIED FRACTURE MORPHOLOGY, INITIAL ENCOUNTER: ICD-10-CM

## 2021-09-16 DIAGNOSIS — S42.202A CLOSED FRACTURE OF PROXIMAL END OF LEFT HUMERUS, UNSPECIFIED FRACTURE MORPHOLOGY, INITIAL ENCOUNTER: Primary | ICD-10-CM

## 2021-09-16 PROCEDURE — 99999 PR PBB SHADOW E&M-EST. PATIENT-LVL V: ICD-10-PCS | Mod: PBBFAC,,, | Performed by: PHYSICIAN ASSISTANT

## 2021-09-16 PROCEDURE — 73030 X-RAY EXAM OF SHOULDER: CPT | Mod: 26,LT,, | Performed by: RADIOLOGY

## 2021-09-16 PROCEDURE — 73030 X-RAY EXAM OF SHOULDER: CPT | Mod: TC,FY,LT

## 2021-09-16 PROCEDURE — 73030 XR SHOULDER TRAUMA 3 VIEW LEFT: ICD-10-PCS | Mod: 26,LT,, | Performed by: RADIOLOGY

## 2021-09-16 PROCEDURE — 99999 PR PBB SHADOW E&M-EST. PATIENT-LVL V: CPT | Mod: PBBFAC,,, | Performed by: PHYSICIAN ASSISTANT

## 2021-09-16 RX ORDER — LIFITEGRAST 50 MG/ML
SOLUTION/ DROPS OPHTHALMIC
Qty: 60 ML | Refills: 10 | Status: SHIPPED | OUTPATIENT
Start: 2021-09-16 | End: 2022-08-19 | Stop reason: SDUPTHER

## 2021-09-16 RX ORDER — CYCLOSPORINE 0.5 MG/ML
EMULSION OPHTHALMIC
Qty: 60 VIAL | Refills: 11 | Status: SHIPPED | OUTPATIENT
Start: 2021-09-16 | End: 2021-11-19 | Stop reason: CLARIF

## 2021-09-16 RX ORDER — SUCRALFATE 1 G/1
1 TABLET ORAL 4 TIMES DAILY
Qty: 360 TABLET | Refills: 3 | Status: SHIPPED | OUTPATIENT
Start: 2021-09-16 | End: 2022-09-09 | Stop reason: SDUPTHER

## 2021-09-17 ENCOUNTER — PATIENT MESSAGE (OUTPATIENT)
Dept: PAIN MEDICINE | Facility: OTHER | Age: 61
End: 2021-09-17

## 2021-09-17 ENCOUNTER — PATIENT MESSAGE (OUTPATIENT)
Dept: RHEUMATOLOGY | Facility: CLINIC | Age: 61
End: 2021-09-17

## 2021-09-20 ENCOUNTER — HOSPITAL ENCOUNTER (OUTPATIENT)
Facility: OTHER | Age: 61
Discharge: HOME | End: 2021-09-20
Attending: ANESTHESIOLOGY | Admitting: ANESTHESIOLOGY
Payer: MEDICARE

## 2021-09-20 ENCOUNTER — PATIENT MESSAGE (OUTPATIENT)
Dept: RHEUMATOLOGY | Facility: CLINIC | Age: 61
End: 2021-09-20

## 2021-09-20 ENCOUNTER — TELEPHONE (OUTPATIENT)
Dept: RHEUMATOLOGY | Facility: CLINIC | Age: 61
End: 2021-09-20

## 2021-09-20 VITALS
DIASTOLIC BLOOD PRESSURE: 78 MMHG | RESPIRATION RATE: 18 BRPM | WEIGHT: 150 LBS | OXYGEN SATURATION: 100 % | SYSTOLIC BLOOD PRESSURE: 136 MMHG | TEMPERATURE: 99 F | BODY MASS INDEX: 28.32 KG/M2 | HEIGHT: 61 IN | HEART RATE: 104 BPM

## 2021-09-20 DIAGNOSIS — Z79.01 CHRONIC ANTICOAGULATION: ICD-10-CM

## 2021-09-20 DIAGNOSIS — M47.816 OSTEOARTHRITIS OF LUMBAR SPINE: ICD-10-CM

## 2021-09-20 DIAGNOSIS — M47.816 OSTEOARTHRITIS OF LUMBAR SPINE, UNSPECIFIED SPINAL OSTEOARTHRITIS COMPLICATION STATUS: Primary | ICD-10-CM

## 2021-09-20 DIAGNOSIS — M47.819 SPONDYLOSIS WITHOUT MYELOPATHY: ICD-10-CM

## 2021-09-20 PROCEDURE — 25000003 PHARM REV CODE 250: Performed by: ANESTHESIOLOGY

## 2021-09-20 PROCEDURE — 25000003 PHARM REV CODE 250: Performed by: STUDENT IN AN ORGANIZED HEALTH CARE EDUCATION/TRAINING PROGRAM

## 2021-09-20 PROCEDURE — 64635 DESTROY LUMB/SAC FACET JNT: CPT | Mod: LT,,, | Performed by: ANESTHESIOLOGY

## 2021-09-20 PROCEDURE — 64635 PR DESTROY LUMB/SAC FACET JNT: ICD-10-PCS | Mod: LT,,, | Performed by: ANESTHESIOLOGY

## 2021-09-20 PROCEDURE — 64635 DESTROY LUMB/SAC FACET JNT: CPT | Mod: LT | Performed by: ANESTHESIOLOGY

## 2021-09-20 PROCEDURE — 64636 DESTROY L/S FACET JNT ADDL: CPT | Mod: LT,,, | Performed by: ANESTHESIOLOGY

## 2021-09-20 PROCEDURE — 63600175 PHARM REV CODE 636 W HCPCS: Performed by: ANESTHESIOLOGY

## 2021-09-20 PROCEDURE — 64636 PR DESTROY L/S FACET JNT ADDL: ICD-10-PCS | Mod: LT,,, | Performed by: ANESTHESIOLOGY

## 2021-09-20 PROCEDURE — 64636 DESTROY L/S FACET JNT ADDL: CPT | Mod: LT | Performed by: ANESTHESIOLOGY

## 2021-09-20 RX ORDER — MIDAZOLAM HYDROCHLORIDE 1 MG/ML
INJECTION INTRAMUSCULAR; INTRAVENOUS
Status: DISCONTINUED | OUTPATIENT
Start: 2021-09-20 | End: 2021-09-20 | Stop reason: HOSPADM

## 2021-09-20 RX ORDER — TRIAMCINOLONE ACETONIDE 40 MG/ML
INJECTION, SUSPENSION INTRA-ARTICULAR; INTRAMUSCULAR
Status: DISCONTINUED | OUTPATIENT
Start: 2021-09-20 | End: 2021-09-20 | Stop reason: HOSPADM

## 2021-09-20 RX ORDER — BUPIVACAINE HYDROCHLORIDE 2.5 MG/ML
INJECTION, SOLUTION EPIDURAL; INFILTRATION; INTRACAUDAL
Status: DISCONTINUED | OUTPATIENT
Start: 2021-09-20 | End: 2021-09-20 | Stop reason: HOSPADM

## 2021-09-20 RX ORDER — LIDOCAINE HYDROCHLORIDE 10 MG/ML
INJECTION INFILTRATION; PERINEURAL
Status: DISCONTINUED | OUTPATIENT
Start: 2021-09-20 | End: 2021-09-20 | Stop reason: HOSPADM

## 2021-09-20 RX ORDER — FENTANYL CITRATE 50 UG/ML
INJECTION, SOLUTION INTRAMUSCULAR; INTRAVENOUS
Status: DISCONTINUED | OUTPATIENT
Start: 2021-09-20 | End: 2021-09-20 | Stop reason: HOSPADM

## 2021-09-20 RX ORDER — SODIUM CHLORIDE 9 MG/ML
INJECTION, SOLUTION INTRAVENOUS CONTINUOUS
Status: DISCONTINUED | OUTPATIENT
Start: 2021-09-20 | End: 2021-09-20 | Stop reason: HOSPADM

## 2021-09-20 RX ADMIN — SODIUM CHLORIDE: 0.9 INJECTION, SOLUTION INTRAVENOUS at 11:09

## 2021-09-24 ENCOUNTER — PATIENT MESSAGE (OUTPATIENT)
Dept: PAIN MEDICINE | Facility: OTHER | Age: 61
End: 2021-09-24

## 2021-09-24 DIAGNOSIS — M47.816 SPONDYLOSIS OF LUMBAR REGION WITHOUT MYELOPATHY OR RADICULOPATHY: ICD-10-CM

## 2021-09-24 DIAGNOSIS — M47.26 OSTEOARTHRITIS OF SPINE WITH RADICULOPATHY, LUMBAR REGION: ICD-10-CM

## 2021-09-24 DIAGNOSIS — M47.816 FACET ARTHRITIS, DEGENERATIVE, LUMBAR SPINE: ICD-10-CM

## 2021-09-24 RX ORDER — TRAMADOL HYDROCHLORIDE 50 MG/1
50 TABLET ORAL EVERY 12 HOURS PRN
Qty: 60 TABLET | Refills: 0 | Status: SHIPPED | OUTPATIENT
Start: 2021-09-24 | End: 2021-10-28

## 2021-09-28 RX ORDER — TRAMADOL HYDROCHLORIDE 50 MG/1
50 TABLET ORAL EVERY 12 HOURS PRN
Qty: 60 TABLET | Refills: 0 | Status: SHIPPED | OUTPATIENT
Start: 2021-09-28 | End: 2021-09-28 | Stop reason: SDUPTHER

## 2021-09-29 ENCOUNTER — OFFICE VISIT (OUTPATIENT)
Dept: SPORTS MEDICINE | Facility: CLINIC | Age: 61
End: 2021-09-29
Payer: MEDICARE

## 2021-09-29 ENCOUNTER — HOSPITAL ENCOUNTER (OUTPATIENT)
Dept: RADIOLOGY | Facility: HOSPITAL | Age: 61
Discharge: HOME | End: 2021-09-29
Attending: PHYSICIAN ASSISTANT
Payer: MEDICARE

## 2021-09-29 VITALS
BODY MASS INDEX: 28.32 KG/M2 | SYSTOLIC BLOOD PRESSURE: 124 MMHG | DIASTOLIC BLOOD PRESSURE: 81 MMHG | WEIGHT: 150 LBS | HEIGHT: 61 IN | HEART RATE: 98 BPM

## 2021-09-29 DIAGNOSIS — M76.892 HIP FLEXOR TENDINITIS, LEFT: ICD-10-CM

## 2021-09-29 DIAGNOSIS — M25.552 LEFT HIP PAIN: ICD-10-CM

## 2021-09-29 DIAGNOSIS — M25.552 LEFT HIP PAIN: Primary | ICD-10-CM

## 2021-09-29 DIAGNOSIS — M76.892 ADDUCTOR TENDINITIS OF LEFT HIP: ICD-10-CM

## 2021-09-29 PROCEDURE — 99999 PR PBB SHADOW E&M-EST. PATIENT-LVL V: ICD-10-PCS | Mod: PBBFAC,,, | Performed by: PHYSICIAN ASSISTANT

## 2021-09-29 PROCEDURE — 3008F BODY MASS INDEX DOCD: CPT | Mod: CPTII,S$GLB,, | Performed by: PHYSICIAN ASSISTANT

## 2021-09-29 PROCEDURE — 3079F DIAST BP 80-89 MM HG: CPT | Mod: CPTII,S$GLB,, | Performed by: PHYSICIAN ASSISTANT

## 2021-09-29 PROCEDURE — 99999 PR PBB SHADOW E&M-EST. PATIENT-LVL V: CPT | Mod: PBBFAC,,, | Performed by: PHYSICIAN ASSISTANT

## 2021-09-29 PROCEDURE — 99214 OFFICE O/P EST MOD 30 MIN: CPT | Mod: S$GLB,,, | Performed by: PHYSICIAN ASSISTANT

## 2021-09-29 PROCEDURE — 73502 X-RAY EXAM HIP UNI 2-3 VIEWS: CPT | Mod: 26,LT,, | Performed by: RADIOLOGY

## 2021-09-29 PROCEDURE — 73502 X-RAY EXAM HIP UNI 2-3 VIEWS: CPT | Mod: TC,LT

## 2021-09-29 PROCEDURE — 3074F PR MOST RECENT SYSTOLIC BLOOD PRESSURE < 130 MM HG: ICD-10-PCS | Mod: CPTII,S$GLB,, | Performed by: PHYSICIAN ASSISTANT

## 2021-09-29 PROCEDURE — 73502 XR HIP WITH PELVIS WHEN PERFORMED, 2 OR 3 VIEWS LEFT: ICD-10-PCS | Mod: 26,LT,, | Performed by: RADIOLOGY

## 2021-09-29 PROCEDURE — 3008F PR BODY MASS INDEX (BMI) DOCUMENTED: ICD-10-PCS | Mod: CPTII,S$GLB,, | Performed by: PHYSICIAN ASSISTANT

## 2021-09-29 PROCEDURE — 3074F SYST BP LT 130 MM HG: CPT | Mod: CPTII,S$GLB,, | Performed by: PHYSICIAN ASSISTANT

## 2021-09-29 PROCEDURE — 99214 PR OFFICE/OUTPT VISIT, EST, LEVL IV, 30-39 MIN: ICD-10-PCS | Mod: S$GLB,,, | Performed by: PHYSICIAN ASSISTANT

## 2021-09-29 PROCEDURE — 3079F PR MOST RECENT DIASTOLIC BLOOD PRESSURE 80-89 MM HG: ICD-10-PCS | Mod: CPTII,S$GLB,, | Performed by: PHYSICIAN ASSISTANT

## 2021-10-01 ENCOUNTER — PATIENT MESSAGE (OUTPATIENT)
Dept: PAIN MEDICINE | Facility: OTHER | Age: 61
End: 2021-10-01

## 2021-10-02 ENCOUNTER — PATIENT MESSAGE (OUTPATIENT)
Dept: PHARMACY | Facility: CLINIC | Age: 61
End: 2021-10-02

## 2021-10-04 ENCOUNTER — HOSPITAL ENCOUNTER (OUTPATIENT)
Facility: OTHER | Age: 61
Discharge: HOME | End: 2021-10-04
Attending: ANESTHESIOLOGY | Admitting: ANESTHESIOLOGY
Payer: MEDICARE

## 2021-10-04 VITALS
SYSTOLIC BLOOD PRESSURE: 124 MMHG | HEIGHT: 61 IN | OXYGEN SATURATION: 100 % | RESPIRATION RATE: 14 BRPM | WEIGHT: 150 LBS | BODY MASS INDEX: 28.32 KG/M2 | HEART RATE: 92 BPM | DIASTOLIC BLOOD PRESSURE: 84 MMHG | TEMPERATURE: 98 F

## 2021-10-04 DIAGNOSIS — M54.16 LUMBAR RADICULOPATHY: ICD-10-CM

## 2021-10-04 DIAGNOSIS — M47.896 OTHER OSTEOARTHRITIS OF SPINE, LUMBAR REGION: Primary | ICD-10-CM

## 2021-10-04 DIAGNOSIS — G89.29 CHRONIC PAIN: ICD-10-CM

## 2021-10-04 PROCEDURE — 25000003 PHARM REV CODE 250: Performed by: ANESTHESIOLOGY

## 2021-10-04 PROCEDURE — 64636 PR DESTROY L/S FACET JNT ADDL: ICD-10-PCS | Mod: RT,,, | Performed by: ANESTHESIOLOGY

## 2021-10-04 PROCEDURE — 64635 DESTROY LUMB/SAC FACET JNT: CPT | Mod: RT | Performed by: ANESTHESIOLOGY

## 2021-10-04 PROCEDURE — 64636 DESTROY L/S FACET JNT ADDL: CPT | Mod: RT | Performed by: ANESTHESIOLOGY

## 2021-10-04 PROCEDURE — 64635 PR DESTROY LUMB/SAC FACET JNT: ICD-10-PCS | Mod: RT,,, | Performed by: ANESTHESIOLOGY

## 2021-10-04 PROCEDURE — 64635 DESTROY LUMB/SAC FACET JNT: CPT | Mod: RT,,, | Performed by: ANESTHESIOLOGY

## 2021-10-04 PROCEDURE — 64636 DESTROY L/S FACET JNT ADDL: CPT | Mod: RT,,, | Performed by: ANESTHESIOLOGY

## 2021-10-04 PROCEDURE — 63600175 PHARM REV CODE 636 W HCPCS: Performed by: ANESTHESIOLOGY

## 2021-10-04 RX ORDER — MIDAZOLAM HYDROCHLORIDE 1 MG/ML
INJECTION INTRAMUSCULAR; INTRAVENOUS
Status: DISCONTINUED | OUTPATIENT
Start: 2021-10-04 | End: 2021-10-04 | Stop reason: HOSPADM

## 2021-10-04 RX ORDER — FENTANYL CITRATE 50 UG/ML
INJECTION, SOLUTION INTRAMUSCULAR; INTRAVENOUS
Status: DISCONTINUED | OUTPATIENT
Start: 2021-10-04 | End: 2021-10-04 | Stop reason: HOSPADM

## 2021-10-04 RX ORDER — SODIUM CHLORIDE 9 MG/ML
INJECTION, SOLUTION INTRAVENOUS CONTINUOUS
Status: DISCONTINUED | OUTPATIENT
Start: 2021-10-04 | End: 2021-10-04 | Stop reason: HOSPADM

## 2021-10-04 RX ADMIN — SODIUM CHLORIDE: 0.9 INJECTION, SOLUTION INTRAVENOUS at 09:10

## 2021-10-05 ENCOUNTER — SPECIALTY PHARMACY (OUTPATIENT)
Dept: PHARMACY | Facility: CLINIC | Age: 61
End: 2021-10-05

## 2021-10-05 PROBLEM — M25.60 DECREASED RANGE OF MOTION WITH DECREASED STRENGTH: Status: ACTIVE | Noted: 2021-10-05

## 2021-10-05 PROBLEM — Z78.9 IMPAIRED MOBILITY AND ACTIVITIES OF DAILY LIVING: Status: ACTIVE | Noted: 2021-10-05

## 2021-10-05 PROBLEM — R53.1 DECREASED RANGE OF MOTION WITH DECREASED STRENGTH: Status: ACTIVE | Noted: 2021-10-05

## 2021-10-05 PROBLEM — Z74.09 IMPAIRED MOBILITY AND ACTIVITIES OF DAILY LIVING: Status: ACTIVE | Noted: 2021-10-05

## 2021-10-05 PROBLEM — Z74.09 IMPAIRED FUNCTIONAL MOBILITY, BALANCE, GAIT, AND ENDURANCE: Status: RESOLVED | Noted: 2019-06-13 | Resolved: 2021-10-05

## 2021-10-06 ENCOUNTER — TELEPHONE (OUTPATIENT)
Dept: GASTROENTEROLOGY | Facility: CLINIC | Age: 61
End: 2021-10-06

## 2021-10-07 DIAGNOSIS — K31.84 GASTROPARESIS: Primary | ICD-10-CM

## 2021-10-08 PROBLEM — R29.898 DECREASED STRENGTH OF LOWER EXTREMITY: Status: ACTIVE | Noted: 2021-10-08

## 2021-10-12 ENCOUNTER — TELEPHONE (OUTPATIENT)
Dept: GASTROENTEROLOGY | Facility: CLINIC | Age: 61
End: 2021-10-12

## 2021-10-13 ENCOUNTER — SPECIALTY PHARMACY (OUTPATIENT)
Dept: PHARMACY | Facility: CLINIC | Age: 61
End: 2021-10-13
Payer: MEDICARE

## 2021-10-18 ENCOUNTER — PATIENT MESSAGE (OUTPATIENT)
Dept: PULMONOLOGY | Facility: CLINIC | Age: 61
End: 2021-10-18
Payer: MEDICARE

## 2021-10-18 DIAGNOSIS — J30.89 PERENNIAL ALLERGIC RHINITIS: ICD-10-CM

## 2021-10-19 RX ORDER — MONTELUKAST SODIUM 10 MG/1
10 TABLET ORAL NIGHTLY
Qty: 90 TABLET | Refills: 3 | Status: SHIPPED | OUTPATIENT
Start: 2021-10-19 | End: 2022-02-21

## 2021-10-22 ENCOUNTER — TELEPHONE (OUTPATIENT)
Dept: OTOLARYNGOLOGY | Facility: CLINIC | Age: 61
End: 2021-10-22

## 2021-10-25 ENCOUNTER — PATIENT OUTREACH (OUTPATIENT)
Dept: ADMINISTRATIVE | Facility: OTHER | Age: 61
End: 2021-10-25
Payer: MEDICARE

## 2021-10-26 ENCOUNTER — CLINICAL SUPPORT (OUTPATIENT)
Dept: OTOLARYNGOLOGY | Facility: CLINIC | Age: 61
End: 2021-10-26
Payer: MEDICARE

## 2021-10-26 ENCOUNTER — OFFICE VISIT (OUTPATIENT)
Dept: OTOLARYNGOLOGY | Facility: CLINIC | Age: 61
End: 2021-10-26
Payer: MEDICARE

## 2021-10-26 VITALS
BODY MASS INDEX: 29.16 KG/M2 | HEART RATE: 102 BPM | DIASTOLIC BLOOD PRESSURE: 81 MMHG | SYSTOLIC BLOOD PRESSURE: 136 MMHG | WEIGHT: 154.44 LBS | HEIGHT: 61 IN

## 2021-10-26 DIAGNOSIS — H61.21 IMPACTED CERUMEN OF RIGHT EAR: ICD-10-CM

## 2021-10-26 DIAGNOSIS — J31.0 CHRONIC RHINITIS: Primary | Chronic | ICD-10-CM

## 2021-10-26 DIAGNOSIS — H60.8X3 CHRONIC ECZEMATOUS OTITIS EXTERNA OF BOTH EARS: Chronic | ICD-10-CM

## 2021-10-26 DIAGNOSIS — H90.3 SENSORINEURAL HEARING LOSS (SNHL), BILATERAL: Chronic | ICD-10-CM

## 2021-10-26 DIAGNOSIS — H91.93 HIGH FREQUENCY HEARING LOSS OF BOTH EARS: Primary | ICD-10-CM

## 2021-10-26 DIAGNOSIS — J34.89 NASAL VESTIBULITIS: ICD-10-CM

## 2021-10-26 PROCEDURE — 1159F PR MEDICATION LIST DOCUMENTED IN MEDICAL RECORD: ICD-10-PCS | Mod: CPTII,S$GLB,, | Performed by: OTOLARYNGOLOGY

## 2021-10-26 PROCEDURE — 69210 PR REMOVAL IMPACTED CERUMEN REQUIRING INSTRUMENTATION, UNILATERAL: ICD-10-PCS | Mod: S$GLB,,, | Performed by: OTOLARYNGOLOGY

## 2021-10-26 PROCEDURE — 1160F PR REVIEW ALL MEDS BY PRESCRIBER/CLIN PHARMACIST DOCUMENTED: ICD-10-PCS | Mod: CPTII,S$GLB,, | Performed by: OTOLARYNGOLOGY

## 2021-10-26 PROCEDURE — 3075F PR MOST RECENT SYSTOLIC BLOOD PRESS GE 130-139MM HG: ICD-10-PCS | Mod: CPTII,S$GLB,, | Performed by: OTOLARYNGOLOGY

## 2021-10-26 PROCEDURE — 1160F RVW MEDS BY RX/DR IN RCRD: CPT | Mod: CPTII,S$GLB,, | Performed by: OTOLARYNGOLOGY

## 2021-10-26 PROCEDURE — 3079F DIAST BP 80-89 MM HG: CPT | Mod: CPTII,S$GLB,, | Performed by: OTOLARYNGOLOGY

## 2021-10-26 PROCEDURE — 99999 PR PBB SHADOW E&M-EST. PATIENT-LVL V: ICD-10-PCS | Mod: PBBFAC,,, | Performed by: OTOLARYNGOLOGY

## 2021-10-26 PROCEDURE — 69210 REMOVE IMPACTED EAR WAX UNI: CPT | Mod: S$GLB,,, | Performed by: OTOLARYNGOLOGY

## 2021-10-26 PROCEDURE — 92552 PR PURE TONE AUDIOMETRY, AIR: ICD-10-PCS | Mod: S$GLB,,, | Performed by: PHYSICIAN ASSISTANT

## 2021-10-26 PROCEDURE — 3008F PR BODY MASS INDEX (BMI) DOCUMENTED: ICD-10-PCS | Mod: CPTII,S$GLB,, | Performed by: OTOLARYNGOLOGY

## 2021-10-26 PROCEDURE — 1159F MED LIST DOCD IN RCRD: CPT | Mod: CPTII,S$GLB,, | Performed by: OTOLARYNGOLOGY

## 2021-10-26 PROCEDURE — 99214 OFFICE O/P EST MOD 30 MIN: CPT | Mod: 25,S$GLB,, | Performed by: OTOLARYNGOLOGY

## 2021-10-26 PROCEDURE — 99999 PR PBB SHADOW E&M-EST. PATIENT-LVL V: CPT | Mod: PBBFAC,,, | Performed by: OTOLARYNGOLOGY

## 2021-10-26 PROCEDURE — 92556 PR SPEECH AUDIOMETRY, COMPLETE: ICD-10-PCS | Mod: S$GLB,,, | Performed by: PHYSICIAN ASSISTANT

## 2021-10-26 PROCEDURE — 92556 SPEECH AUDIOMETRY COMPLETE: CPT | Mod: S$GLB,,, | Performed by: PHYSICIAN ASSISTANT

## 2021-10-26 PROCEDURE — 99214 PR OFFICE/OUTPT VISIT, EST, LEVL IV, 30-39 MIN: ICD-10-PCS | Mod: 25,S$GLB,, | Performed by: OTOLARYNGOLOGY

## 2021-10-26 PROCEDURE — 3075F SYST BP GE 130 - 139MM HG: CPT | Mod: CPTII,S$GLB,, | Performed by: OTOLARYNGOLOGY

## 2021-10-26 PROCEDURE — 3079F PR MOST RECENT DIASTOLIC BLOOD PRESSURE 80-89 MM HG: ICD-10-PCS | Mod: CPTII,S$GLB,, | Performed by: OTOLARYNGOLOGY

## 2021-10-26 PROCEDURE — 92552 PURE TONE AUDIOMETRY AIR: CPT | Mod: S$GLB,,, | Performed by: PHYSICIAN ASSISTANT

## 2021-10-26 PROCEDURE — 3008F BODY MASS INDEX DOCD: CPT | Mod: CPTII,S$GLB,, | Performed by: OTOLARYNGOLOGY

## 2021-10-26 RX ORDER — MUPIROCIN 20 MG/G
OINTMENT TOPICAL 2 TIMES DAILY
Qty: 22 G | Refills: 0 | Status: SHIPPED | OUTPATIENT
Start: 2021-10-26 | End: 2021-11-05

## 2021-10-26 RX ORDER — METHENAMINE HIPPURATE 1000 MG/1
1 TABLET ORAL 2 TIMES DAILY
Qty: 180 TABLET | Refills: 3 | Status: SHIPPED | OUTPATIENT
Start: 2021-10-26 | End: 2021-12-08

## 2021-10-26 RX ORDER — OFLOXACIN 3 MG/ML
3 SOLUTION AURICULAR (OTIC) 2 TIMES DAILY
Qty: 10 ML | Refills: 0 | Status: SHIPPED | OUTPATIENT
Start: 2021-10-26 | End: 2021-11-20

## 2021-10-26 RX ORDER — IPRATROPIUM BROMIDE 21 UG/1
2 SPRAY, METERED NASAL 2 TIMES DAILY
Qty: 30 ML | Refills: 0 | Status: SHIPPED | OUTPATIENT
Start: 2021-10-26 | End: 2021-12-08

## 2021-10-28 DIAGNOSIS — T38.0X5A STEROID-INDUCED OSTEOPOROSIS: ICD-10-CM

## 2021-10-28 DIAGNOSIS — M84.352A STRESS FRACTURE OF NECK OF LEFT FEMUR: ICD-10-CM

## 2021-10-28 DIAGNOSIS — M81.8 STEROID-INDUCED OSTEOPOROSIS: ICD-10-CM

## 2021-10-28 DIAGNOSIS — M81.0 POST-MENOPAUSAL OSTEOPOROSIS: ICD-10-CM

## 2021-10-28 RX ORDER — TERIPARATIDE 250 UG/ML
20 INJECTION, SOLUTION SUBCUTANEOUS DAILY
Qty: 2.4 ML | Refills: 3 | Status: SHIPPED | OUTPATIENT
Start: 2021-10-28 | End: 2022-01-05 | Stop reason: ALTCHOICE

## 2021-10-29 ENCOUNTER — TELEPHONE (OUTPATIENT)
Dept: PAIN MEDICINE | Facility: CLINIC | Age: 61
End: 2021-10-29
Payer: MEDICARE

## 2021-10-29 ENCOUNTER — SPECIALTY PHARMACY (OUTPATIENT)
Dept: PHARMACY | Facility: CLINIC | Age: 61
End: 2021-10-29
Payer: MEDICARE

## 2021-11-01 ENCOUNTER — OFFICE VISIT (OUTPATIENT)
Dept: PAIN MEDICINE | Facility: CLINIC | Age: 61
End: 2021-11-01
Payer: MEDICARE

## 2021-11-01 ENCOUNTER — PATIENT MESSAGE (OUTPATIENT)
Dept: RHEUMATOLOGY | Facility: CLINIC | Age: 61
End: 2021-11-01
Payer: MEDICARE

## 2021-11-01 VITALS
BODY MASS INDEX: 29.22 KG/M2 | WEIGHT: 154.75 LBS | RESPIRATION RATE: 19 BRPM | DIASTOLIC BLOOD PRESSURE: 80 MMHG | HEART RATE: 113 BPM | TEMPERATURE: 98 F | HEIGHT: 61 IN | SYSTOLIC BLOOD PRESSURE: 119 MMHG

## 2021-11-01 DIAGNOSIS — M47.26 OSTEOARTHRITIS OF SPINE WITH RADICULOPATHY, LUMBAR REGION: ICD-10-CM

## 2021-11-01 DIAGNOSIS — M51.36 DDD (DEGENERATIVE DISC DISEASE), LUMBAR: Primary | ICD-10-CM

## 2021-11-01 DIAGNOSIS — M47.816 SPONDYLOSIS OF LUMBAR REGION WITHOUT MYELOPATHY OR RADICULOPATHY: ICD-10-CM

## 2021-11-01 DIAGNOSIS — M54.16 LUMBAR RADICULOPATHY: ICD-10-CM

## 2021-11-01 DIAGNOSIS — I25.10 CORONARY ARTERY DISEASE INVOLVING NATIVE CORONARY ARTERY OF NATIVE HEART WITHOUT ANGINA PECTORIS: Chronic | ICD-10-CM

## 2021-11-01 DIAGNOSIS — M47.816 FACET ARTHRITIS, DEGENERATIVE, LUMBAR SPINE: ICD-10-CM

## 2021-11-01 DIAGNOSIS — G89.29 OTHER CHRONIC PAIN: ICD-10-CM

## 2021-11-01 PROCEDURE — 99214 OFFICE O/P EST MOD 30 MIN: CPT | Mod: S$GLB,,, | Performed by: NURSE PRACTITIONER

## 2021-11-01 PROCEDURE — 3079F PR MOST RECENT DIASTOLIC BLOOD PRESSURE 80-89 MM HG: ICD-10-PCS | Mod: CPTII,S$GLB,, | Performed by: NURSE PRACTITIONER

## 2021-11-01 PROCEDURE — 3079F DIAST BP 80-89 MM HG: CPT | Mod: CPTII,S$GLB,, | Performed by: NURSE PRACTITIONER

## 2021-11-01 PROCEDURE — 99999 PR PBB SHADOW E&M-EST. PATIENT-LVL V: ICD-10-PCS | Mod: PBBFAC,,, | Performed by: NURSE PRACTITIONER

## 2021-11-01 PROCEDURE — 3008F PR BODY MASS INDEX (BMI) DOCUMENTED: ICD-10-PCS | Mod: CPTII,S$GLB,, | Performed by: NURSE PRACTITIONER

## 2021-11-01 PROCEDURE — 1160F RVW MEDS BY RX/DR IN RCRD: CPT | Mod: CPTII,S$GLB,, | Performed by: NURSE PRACTITIONER

## 2021-11-01 PROCEDURE — 3074F SYST BP LT 130 MM HG: CPT | Mod: CPTII,S$GLB,, | Performed by: NURSE PRACTITIONER

## 2021-11-01 PROCEDURE — 1159F PR MEDICATION LIST DOCUMENTED IN MEDICAL RECORD: ICD-10-PCS | Mod: CPTII,S$GLB,, | Performed by: NURSE PRACTITIONER

## 2021-11-01 PROCEDURE — 3074F PR MOST RECENT SYSTOLIC BLOOD PRESSURE < 130 MM HG: ICD-10-PCS | Mod: CPTII,S$GLB,, | Performed by: NURSE PRACTITIONER

## 2021-11-01 PROCEDURE — 1159F MED LIST DOCD IN RCRD: CPT | Mod: CPTII,S$GLB,, | Performed by: NURSE PRACTITIONER

## 2021-11-01 PROCEDURE — 99214 PR OFFICE/OUTPT VISIT, EST, LEVL IV, 30-39 MIN: ICD-10-PCS | Mod: S$GLB,,, | Performed by: NURSE PRACTITIONER

## 2021-11-01 PROCEDURE — 3008F BODY MASS INDEX DOCD: CPT | Mod: CPTII,S$GLB,, | Performed by: NURSE PRACTITIONER

## 2021-11-01 PROCEDURE — 99999 PR PBB SHADOW E&M-EST. PATIENT-LVL V: CPT | Mod: PBBFAC,,, | Performed by: NURSE PRACTITIONER

## 2021-11-01 PROCEDURE — 1160F PR REVIEW ALL MEDS BY PRESCRIBER/CLIN PHARMACIST DOCUMENTED: ICD-10-PCS | Mod: CPTII,S$GLB,, | Performed by: NURSE PRACTITIONER

## 2021-11-01 RX ORDER — ROSUVASTATIN CALCIUM 20 MG/1
20 TABLET, COATED ORAL NIGHTLY
Qty: 90 TABLET | Refills: 3 | Status: SHIPPED | OUTPATIENT
Start: 2021-11-01 | End: 2022-06-23 | Stop reason: SDUPTHER

## 2021-11-02 DIAGNOSIS — M47.816 FACET ARTHRITIS, DEGENERATIVE, LUMBAR SPINE: ICD-10-CM

## 2021-11-02 DIAGNOSIS — M47.26 OSTEOARTHRITIS OF SPINE WITH RADICULOPATHY, LUMBAR REGION: ICD-10-CM

## 2021-11-02 DIAGNOSIS — M47.816 SPONDYLOSIS OF LUMBAR REGION WITHOUT MYELOPATHY OR RADICULOPATHY: ICD-10-CM

## 2021-11-02 RX ORDER — TRAMADOL HYDROCHLORIDE 50 MG/1
50 TABLET ORAL EVERY 12 HOURS PRN
Qty: 60 TABLET | Refills: 0 | Status: CANCELLED | OUTPATIENT
Start: 2021-11-02 | End: 2021-12-02

## 2021-11-02 RX ORDER — TRAMADOL HYDROCHLORIDE 50 MG/1
50 TABLET ORAL EVERY 12 HOURS PRN
Qty: 60 TABLET | Refills: 0 | Status: SHIPPED | OUTPATIENT
Start: 2021-11-02 | End: 2021-11-17 | Stop reason: SDUPTHER

## 2021-11-03 LAB
HPV APTIMA: NEGATIVE
PAP RECOMMENDATION EXT: NORMAL

## 2021-11-09 ENCOUNTER — PATIENT MESSAGE (OUTPATIENT)
Dept: GASTROENTEROLOGY | Facility: CLINIC | Age: 61
End: 2021-11-09
Payer: MEDICARE

## 2021-11-09 ENCOUNTER — PATIENT MESSAGE (OUTPATIENT)
Dept: PHARMACY | Facility: CLINIC | Age: 61
End: 2021-11-09
Payer: MEDICARE

## 2021-11-09 DIAGNOSIS — M06.9 RHEUMATOID ARTHRITIS, INVOLVING UNSPECIFIED SITE, UNSPECIFIED WHETHER RHEUMATOID FACTOR PRESENT: ICD-10-CM

## 2021-11-09 RX ORDER — OMEPRAZOLE 40 MG/1
40 CAPSULE, DELAYED RELEASE ORAL EVERY MORNING
Qty: 30 CAPSULE | Refills: 6 | Status: SHIPPED | OUTPATIENT
Start: 2021-11-09 | End: 2024-03-14 | Stop reason: SDUPTHER

## 2021-11-09 RX ORDER — SARILUMAB 200 MG/1.14ML
200 INJECTION, SOLUTION SUBCUTANEOUS
Qty: 2.28 ML | Refills: 2 | Status: SHIPPED | OUTPATIENT
Start: 2021-11-09 | End: 2022-03-17 | Stop reason: SDUPTHER

## 2021-11-10 ENCOUNTER — SPECIALTY PHARMACY (OUTPATIENT)
Dept: PHARMACY | Facility: CLINIC | Age: 61
End: 2021-11-10
Payer: MEDICARE

## 2021-11-10 DIAGNOSIS — M81.0 OSTEOPOROSIS, UNSPECIFIED OSTEOPOROSIS TYPE, UNSPECIFIED PATHOLOGICAL FRACTURE PRESENCE: Primary | ICD-10-CM

## 2021-11-15 RX ORDER — FLUCONAZOLE 100 MG/1
TABLET ORAL
Qty: 14 TABLET | Refills: 0 | Status: SHIPPED | OUTPATIENT
Start: 2021-11-15 | End: 2021-12-13 | Stop reason: SDUPTHER

## 2021-11-17 ENCOUNTER — OFFICE VISIT (OUTPATIENT)
Dept: SPORTS MEDICINE | Facility: CLINIC | Age: 61
End: 2021-11-17
Payer: MEDICARE

## 2021-11-17 ENCOUNTER — TELEPHONE (OUTPATIENT)
Dept: RHEUMATOLOGY | Facility: CLINIC | Age: 61
End: 2021-11-17
Payer: MEDICARE

## 2021-11-17 ENCOUNTER — PATIENT MESSAGE (OUTPATIENT)
Dept: GASTROENTEROLOGY | Facility: CLINIC | Age: 61
End: 2021-11-17
Payer: MEDICARE

## 2021-11-17 VITALS
SYSTOLIC BLOOD PRESSURE: 121 MMHG | WEIGHT: 152 LBS | HEART RATE: 96 BPM | BODY MASS INDEX: 28.7 KG/M2 | DIASTOLIC BLOOD PRESSURE: 85 MMHG | HEIGHT: 61 IN | TEMPERATURE: 98 F

## 2021-11-17 DIAGNOSIS — R74.01 HIGH TRANSAMINASE LEVELS: Primary | ICD-10-CM

## 2021-11-17 DIAGNOSIS — M25.552 CHRONIC HIP PAIN, LEFT: Primary | ICD-10-CM

## 2021-11-17 DIAGNOSIS — G89.29 CHRONIC HIP PAIN, LEFT: Primary | ICD-10-CM

## 2021-11-17 DIAGNOSIS — M25.559 PAIN IN UNSPECIFIED HIP: ICD-10-CM

## 2021-11-17 DIAGNOSIS — M76.892 ADDUCTOR TENDINITIS OF LEFT HIP: ICD-10-CM

## 2021-11-17 DIAGNOSIS — M76.892 HIP FLEXOR TENDINITIS, LEFT: ICD-10-CM

## 2021-11-17 PROCEDURE — 3079F DIAST BP 80-89 MM HG: CPT | Mod: CPTII,S$GLB,, | Performed by: PHYSICIAN ASSISTANT

## 2021-11-17 PROCEDURE — 1159F PR MEDICATION LIST DOCUMENTED IN MEDICAL RECORD: ICD-10-PCS | Mod: CPTII,S$GLB,, | Performed by: PHYSICIAN ASSISTANT

## 2021-11-17 PROCEDURE — 1160F RVW MEDS BY RX/DR IN RCRD: CPT | Mod: CPTII,S$GLB,, | Performed by: PHYSICIAN ASSISTANT

## 2021-11-17 PROCEDURE — 1160F PR REVIEW ALL MEDS BY PRESCRIBER/CLIN PHARMACIST DOCUMENTED: ICD-10-PCS | Mod: CPTII,S$GLB,, | Performed by: PHYSICIAN ASSISTANT

## 2021-11-17 PROCEDURE — 1159F MED LIST DOCD IN RCRD: CPT | Mod: CPTII,S$GLB,, | Performed by: PHYSICIAN ASSISTANT

## 2021-11-17 PROCEDURE — 3074F PR MOST RECENT SYSTOLIC BLOOD PRESSURE < 130 MM HG: ICD-10-PCS | Mod: CPTII,S$GLB,, | Performed by: PHYSICIAN ASSISTANT

## 2021-11-17 PROCEDURE — 3074F SYST BP LT 130 MM HG: CPT | Mod: CPTII,S$GLB,, | Performed by: PHYSICIAN ASSISTANT

## 2021-11-17 PROCEDURE — 3008F BODY MASS INDEX DOCD: CPT | Mod: CPTII,S$GLB,, | Performed by: PHYSICIAN ASSISTANT

## 2021-11-17 PROCEDURE — 3008F PR BODY MASS INDEX (BMI) DOCUMENTED: ICD-10-PCS | Mod: CPTII,S$GLB,, | Performed by: PHYSICIAN ASSISTANT

## 2021-11-17 PROCEDURE — 99214 OFFICE O/P EST MOD 30 MIN: CPT | Mod: S$GLB,,, | Performed by: PHYSICIAN ASSISTANT

## 2021-11-17 PROCEDURE — 99999 PR PBB SHADOW E&M-EST. PATIENT-LVL V: ICD-10-PCS | Mod: PBBFAC,,, | Performed by: PHYSICIAN ASSISTANT

## 2021-11-17 PROCEDURE — 3079F PR MOST RECENT DIASTOLIC BLOOD PRESSURE 80-89 MM HG: ICD-10-PCS | Mod: CPTII,S$GLB,, | Performed by: PHYSICIAN ASSISTANT

## 2021-11-17 PROCEDURE — 99999 PR PBB SHADOW E&M-EST. PATIENT-LVL V: CPT | Mod: PBBFAC,,, | Performed by: PHYSICIAN ASSISTANT

## 2021-11-17 PROCEDURE — 99214 PR OFFICE/OUTPT VISIT, EST, LEVL IV, 30-39 MIN: ICD-10-PCS | Mod: S$GLB,,, | Performed by: PHYSICIAN ASSISTANT

## 2021-11-17 RX ORDER — TRAMADOL HYDROCHLORIDE 50 MG/1
50 TABLET ORAL EVERY 8 HOURS PRN
Qty: 21 TABLET | Refills: 0 | Status: SHIPPED | OUTPATIENT
Start: 2021-11-17 | End: 2021-12-18

## 2021-11-18 ENCOUNTER — HOSPITAL ENCOUNTER (OUTPATIENT)
Facility: OTHER | Age: 61
Discharge: HOME | End: 2021-11-18
Attending: ANESTHESIOLOGY | Admitting: ANESTHESIOLOGY
Payer: MEDICARE

## 2021-11-18 VITALS
BODY MASS INDEX: 28.7 KG/M2 | HEIGHT: 61 IN | WEIGHT: 152 LBS | TEMPERATURE: 99 F | HEART RATE: 88 BPM | RESPIRATION RATE: 16 BRPM | OXYGEN SATURATION: 95 % | SYSTOLIC BLOOD PRESSURE: 120 MMHG | DIASTOLIC BLOOD PRESSURE: 74 MMHG

## 2021-11-18 DIAGNOSIS — M47.816 LUMBAR SPONDYLOSIS: Primary | ICD-10-CM

## 2021-11-18 DIAGNOSIS — G89.29 CHRONIC PAIN: ICD-10-CM

## 2021-11-18 PROCEDURE — 63600175 PHARM REV CODE 636 W HCPCS: Performed by: ANESTHESIOLOGY

## 2021-11-18 PROCEDURE — 62323 NJX INTERLAMINAR LMBR/SAC: CPT | Performed by: ANESTHESIOLOGY

## 2021-11-18 PROCEDURE — 62323 NJX INTERLAMINAR LMBR/SAC: CPT | Mod: ,,, | Performed by: ANESTHESIOLOGY

## 2021-11-18 PROCEDURE — 25500020 PHARM REV CODE 255: Performed by: ANESTHESIOLOGY

## 2021-11-18 PROCEDURE — 62323 PR INJ LUMBAR/SACRAL, W/IMAGING GUIDANCE: ICD-10-PCS | Mod: ,,, | Performed by: ANESTHESIOLOGY

## 2021-11-18 PROCEDURE — 25000003 PHARM REV CODE 250: Performed by: ANESTHESIOLOGY

## 2021-11-18 PROCEDURE — A4216 STERILE WATER/SALINE, 10 ML: HCPCS | Performed by: ANESTHESIOLOGY

## 2021-11-18 RX ORDER — DIPHENHYDRAMINE HYDROCHLORIDE 50 MG/ML
25 INJECTION INTRAMUSCULAR; INTRAVENOUS ONCE
Status: COMPLETED | OUTPATIENT
Start: 2021-11-18 | End: 2021-11-18

## 2021-11-18 RX ORDER — MIDAZOLAM HYDROCHLORIDE 1 MG/ML
INJECTION INTRAMUSCULAR; INTRAVENOUS
Status: DISCONTINUED | OUTPATIENT
Start: 2021-11-18 | End: 2021-11-18 | Stop reason: HOSPADM

## 2021-11-18 RX ORDER — DEXAMETHASONE SODIUM PHOSPHATE 10 MG/ML
INJECTION INTRAMUSCULAR; INTRAVENOUS
Status: DISCONTINUED | OUTPATIENT
Start: 2021-11-18 | End: 2021-11-18 | Stop reason: HOSPADM

## 2021-11-18 RX ORDER — SODIUM CHLORIDE 9 MG/ML
INJECTION, SOLUTION INTRAMUSCULAR; INTRAVENOUS; SUBCUTANEOUS
Status: DISCONTINUED | OUTPATIENT
Start: 2021-11-18 | End: 2021-11-18 | Stop reason: HOSPADM

## 2021-11-18 RX ORDER — FENTANYL CITRATE 50 UG/ML
INJECTION, SOLUTION INTRAMUSCULAR; INTRAVENOUS
Status: DISCONTINUED | OUTPATIENT
Start: 2021-11-18 | End: 2021-11-18 | Stop reason: HOSPADM

## 2021-11-18 RX ORDER — LIDOCAINE HYDROCHLORIDE 10 MG/ML
INJECTION, SOLUTION EPIDURAL; INFILTRATION; INTRACAUDAL; PERINEURAL
Status: DISCONTINUED | OUTPATIENT
Start: 2021-11-18 | End: 2021-11-18 | Stop reason: HOSPADM

## 2021-11-18 RX ORDER — LIDOCAINE HYDROCHLORIDE 20 MG/ML
INJECTION, SOLUTION INFILTRATION; PERINEURAL
Status: DISCONTINUED | OUTPATIENT
Start: 2021-11-18 | End: 2021-11-18 | Stop reason: HOSPADM

## 2021-11-18 RX ADMIN — DIPHENHYDRAMINE HYDROCHLORIDE 25 MG: 50 INJECTION INTRAMUSCULAR; INTRAVENOUS at 09:11

## 2021-11-19 ENCOUNTER — OFFICE VISIT (OUTPATIENT)
Dept: INTERNAL MEDICINE | Facility: CLINIC | Age: 61
End: 2021-11-19
Payer: MEDICARE

## 2021-11-19 VITALS
SYSTOLIC BLOOD PRESSURE: 116 MMHG | HEIGHT: 61 IN | RESPIRATION RATE: 18 BRPM | WEIGHT: 149.69 LBS | TEMPERATURE: 97 F | OXYGEN SATURATION: 97 % | DIASTOLIC BLOOD PRESSURE: 64 MMHG | HEART RATE: 95 BPM | BODY MASS INDEX: 28.26 KG/M2

## 2021-11-19 DIAGNOSIS — D84.9 IMMUNOSUPPRESSED STATUS: ICD-10-CM

## 2021-11-19 DIAGNOSIS — H04.123 BILATERAL DRY EYES: ICD-10-CM

## 2021-11-19 DIAGNOSIS — J06.9 UPPER RESPIRATORY TRACT INFECTION, UNSPECIFIED TYPE: Primary | ICD-10-CM

## 2021-11-19 DIAGNOSIS — R05.9 COUGH: ICD-10-CM

## 2021-11-19 LAB
INFLUENZA A, MOLECULAR: NEGATIVE
INFLUENZA B, MOLECULAR: NEGATIVE
SARS-COV-2 RNA RESP QL NAA+PROBE: NOT DETECTED
SARS-COV-2- CYCLE NUMBER: NORMAL
SPECIMEN SOURCE: NORMAL

## 2021-11-19 PROCEDURE — 3074F SYST BP LT 130 MM HG: CPT | Mod: CPTII,S$GLB,, | Performed by: INTERNAL MEDICINE

## 2021-11-19 PROCEDURE — U0005 INFEC AGEN DETEC AMPLI PROBE: HCPCS | Performed by: INTERNAL MEDICINE

## 2021-11-19 PROCEDURE — 1159F MED LIST DOCD IN RCRD: CPT | Mod: CPTII,S$GLB,, | Performed by: INTERNAL MEDICINE

## 2021-11-19 PROCEDURE — 99214 PR OFFICE/OUTPT VISIT, EST, LEVL IV, 30-39 MIN: ICD-10-PCS | Mod: S$GLB,,, | Performed by: INTERNAL MEDICINE

## 2021-11-19 PROCEDURE — 87502 INFLUENZA DNA AMP PROBE: CPT | Performed by: INTERNAL MEDICINE

## 2021-11-19 PROCEDURE — 99999 PR PBB SHADOW E&M-EST. PATIENT-LVL V: ICD-10-PCS | Mod: PBBFAC,,, | Performed by: INTERNAL MEDICINE

## 2021-11-19 PROCEDURE — 3078F DIAST BP <80 MM HG: CPT | Mod: CPTII,S$GLB,, | Performed by: INTERNAL MEDICINE

## 2021-11-19 PROCEDURE — 3008F BODY MASS INDEX DOCD: CPT | Mod: CPTII,S$GLB,, | Performed by: INTERNAL MEDICINE

## 2021-11-19 PROCEDURE — 3078F PR MOST RECENT DIASTOLIC BLOOD PRESSURE < 80 MM HG: ICD-10-PCS | Mod: CPTII,S$GLB,, | Performed by: INTERNAL MEDICINE

## 2021-11-19 PROCEDURE — 3008F PR BODY MASS INDEX (BMI) DOCUMENTED: ICD-10-PCS | Mod: CPTII,S$GLB,, | Performed by: INTERNAL MEDICINE

## 2021-11-19 PROCEDURE — U0003 INFECTIOUS AGENT DETECTION BY NUCLEIC ACID (DNA OR RNA); SEVERE ACUTE RESPIRATORY SYNDROME CORONAVIRUS 2 (SARS-COV-2) (CORONAVIRUS DISEASE [COVID-19]), AMPLIFIED PROBE TECHNIQUE, MAKING USE OF HIGH THROUGHPUT TECHNOLOGIES AS DESCRIBED BY CMS-2020-01-R: HCPCS | Performed by: INTERNAL MEDICINE

## 2021-11-19 PROCEDURE — 99999 PR PBB SHADOW E&M-EST. PATIENT-LVL V: CPT | Mod: PBBFAC,,, | Performed by: INTERNAL MEDICINE

## 2021-11-19 PROCEDURE — 1159F PR MEDICATION LIST DOCUMENTED IN MEDICAL RECORD: ICD-10-PCS | Mod: CPTII,S$GLB,, | Performed by: INTERNAL MEDICINE

## 2021-11-19 PROCEDURE — 99214 OFFICE O/P EST MOD 30 MIN: CPT | Mod: S$GLB,,, | Performed by: INTERNAL MEDICINE

## 2021-11-19 PROCEDURE — 3074F PR MOST RECENT SYSTOLIC BLOOD PRESSURE < 130 MM HG: ICD-10-PCS | Mod: CPTII,S$GLB,, | Performed by: INTERNAL MEDICINE

## 2021-11-19 RX ORDER — CYCLOSPORINE 0.5 MG/ML
EMULSION OPHTHALMIC
Qty: 60 EACH | Refills: 11 | Status: CANCELLED | OUTPATIENT
Start: 2021-11-19

## 2021-11-19 RX ORDER — CODEINE PHOSPHATE AND GUAIFENESIN 10; 100 MG/5ML; MG/5ML
5 SOLUTION ORAL 3 TIMES DAILY PRN
Qty: 180 ML | Refills: 0 | Status: SHIPPED | OUTPATIENT
Start: 2021-11-19 | End: 2021-12-01

## 2021-11-19 RX ORDER — AZITHROMYCIN 250 MG/1
TABLET, FILM COATED ORAL
Qty: 6 TABLET | Refills: 0 | Status: SHIPPED | OUTPATIENT
Start: 2021-11-19 | End: 2021-11-24

## 2021-11-22 ENCOUNTER — PATIENT MESSAGE (OUTPATIENT)
Dept: INTERNAL MEDICINE | Facility: CLINIC | Age: 61
End: 2021-11-22
Payer: MEDICARE

## 2021-11-22 RX ORDER — LEVOFLOXACIN 500 MG/1
500 TABLET, FILM COATED ORAL DAILY
Qty: 10 TABLET | Refills: 0 | Status: SHIPPED | OUTPATIENT
Start: 2021-11-22 | End: 2022-01-14 | Stop reason: SDUPTHER

## 2021-11-24 ENCOUNTER — TELEPHONE (OUTPATIENT)
Dept: SPORTS MEDICINE | Facility: CLINIC | Age: 61
End: 2021-11-24
Payer: MEDICARE

## 2021-11-29 ENCOUNTER — PATIENT MESSAGE (OUTPATIENT)
Dept: SPORTS MEDICINE | Facility: CLINIC | Age: 61
End: 2021-11-29
Payer: MEDICARE

## 2021-11-29 ENCOUNTER — PATIENT MESSAGE (OUTPATIENT)
Dept: HEMATOLOGY/ONCOLOGY | Facility: CLINIC | Age: 61
End: 2021-11-29
Payer: MEDICARE

## 2021-11-29 ENCOUNTER — PATIENT MESSAGE (OUTPATIENT)
Dept: PHARMACY | Facility: CLINIC | Age: 61
End: 2021-11-29
Payer: MEDICARE

## 2021-11-29 ENCOUNTER — HOSPITAL ENCOUNTER (OUTPATIENT)
Dept: RADIOLOGY | Facility: HOSPITAL | Age: 61
Discharge: HOME | End: 2021-11-29
Attending: PHYSICIAN ASSISTANT
Payer: MEDICARE

## 2021-11-29 DIAGNOSIS — M25.552 CHRONIC HIP PAIN, LEFT: ICD-10-CM

## 2021-11-29 DIAGNOSIS — G89.29 CHRONIC HIP PAIN, LEFT: ICD-10-CM

## 2021-11-29 DIAGNOSIS — M25.559 PAIN IN UNSPECIFIED HIP: ICD-10-CM

## 2021-11-29 PROCEDURE — 73700 CT HIP WITHOUT CONTRAST LEFT: ICD-10-PCS | Mod: 26,LT,, | Performed by: RADIOLOGY

## 2021-11-29 PROCEDURE — 73700 CT LOWER EXTREMITY W/O DYE: CPT | Mod: TC,LT

## 2021-11-29 PROCEDURE — 73700 CT LOWER EXTREMITY W/O DYE: CPT | Mod: 26,LT,, | Performed by: RADIOLOGY

## 2021-11-30 ENCOUNTER — TELEPHONE (OUTPATIENT)
Dept: SPORTS MEDICINE | Facility: CLINIC | Age: 61
End: 2021-11-30
Payer: MEDICARE

## 2021-12-01 ENCOUNTER — PATIENT MESSAGE (OUTPATIENT)
Dept: RHEUMATOLOGY | Facility: CLINIC | Age: 61
End: 2021-12-01
Payer: MEDICARE

## 2021-12-02 ENCOUNTER — PATIENT MESSAGE (OUTPATIENT)
Dept: HEPATOLOGY | Facility: CLINIC | Age: 61
End: 2021-12-02
Payer: MEDICARE

## 2021-12-02 ENCOUNTER — SPECIALTY PHARMACY (OUTPATIENT)
Dept: PHARMACY | Facility: CLINIC | Age: 61
End: 2021-12-02
Payer: MEDICARE

## 2021-12-03 ENCOUNTER — PATIENT MESSAGE (OUTPATIENT)
Dept: HEPATOLOGY | Facility: CLINIC | Age: 61
End: 2021-12-03
Payer: MEDICARE

## 2021-12-03 DIAGNOSIS — Z79.631 LONG TERM METHOTREXATE USER: ICD-10-CM

## 2021-12-03 DIAGNOSIS — R74.8 ELEVATED SERUM GGT LEVEL: Primary | ICD-10-CM

## 2021-12-07 ENCOUNTER — IMMUNIZATION (OUTPATIENT)
Dept: PRIMARY CARE CLINIC | Facility: CLINIC | Age: 61
End: 2021-12-07
Payer: MEDICARE

## 2021-12-07 ENCOUNTER — PATIENT MESSAGE (OUTPATIENT)
Dept: PAIN MEDICINE | Facility: CLINIC | Age: 61
End: 2021-12-07
Payer: MEDICARE

## 2021-12-07 DIAGNOSIS — Z23 NEED FOR VACCINATION: Primary | ICD-10-CM

## 2021-12-07 PROCEDURE — 91300 COVID-19, MRNA, LNP-S, PF, 30 MCG/0.3 ML DOSE VACCINE: CPT | Mod: PBBFAC | Performed by: INTERNAL MEDICINE

## 2021-12-07 PROCEDURE — 0003A COVID-19, MRNA, LNP-S, PF, 30 MCG/0.3 ML DOSE VACCINE: CPT | Mod: CV19,PBBFAC | Performed by: INTERNAL MEDICINE

## 2021-12-08 ENCOUNTER — OFFICE VISIT (OUTPATIENT)
Dept: PAIN MEDICINE | Facility: CLINIC | Age: 61
End: 2021-12-08
Attending: ANESTHESIOLOGY
Payer: MEDICARE

## 2021-12-08 VITALS
BODY MASS INDEX: 28.72 KG/M2 | TEMPERATURE: 98 F | WEIGHT: 152.13 LBS | HEIGHT: 61 IN | HEART RATE: 98 BPM | DIASTOLIC BLOOD PRESSURE: 75 MMHG | SYSTOLIC BLOOD PRESSURE: 114 MMHG

## 2021-12-08 DIAGNOSIS — M47.26 OSTEOARTHRITIS OF SPINE WITH RADICULOPATHY, LUMBAR REGION: ICD-10-CM

## 2021-12-08 DIAGNOSIS — M47.816 LUMBAR SPONDYLOSIS: ICD-10-CM

## 2021-12-08 DIAGNOSIS — M54.16 LUMBAR RADICULOPATHY: Primary | ICD-10-CM

## 2021-12-08 PROCEDURE — 99213 PR OFFICE/OUTPT VISIT, EST, LEVL III, 20-29 MIN: ICD-10-PCS | Mod: S$GLB,,, | Performed by: ANESTHESIOLOGY

## 2021-12-08 PROCEDURE — 99999 PR PBB SHADOW E&M-EST. PATIENT-LVL V: ICD-10-PCS | Mod: PBBFAC,,, | Performed by: ANESTHESIOLOGY

## 2021-12-08 PROCEDURE — 99213 OFFICE O/P EST LOW 20 MIN: CPT | Mod: S$GLB,,, | Performed by: ANESTHESIOLOGY

## 2021-12-08 PROCEDURE — 99999 PR PBB SHADOW E&M-EST. PATIENT-LVL V: CPT | Mod: PBBFAC,,, | Performed by: ANESTHESIOLOGY

## 2021-12-09 ENCOUNTER — TELEPHONE (OUTPATIENT)
Dept: RHEUMATOLOGY | Facility: CLINIC | Age: 61
End: 2021-12-09
Payer: MEDICARE

## 2021-12-09 ENCOUNTER — OFFICE VISIT (OUTPATIENT)
Dept: HEMATOLOGY/ONCOLOGY | Facility: CLINIC | Age: 61
End: 2021-12-09
Payer: MEDICARE

## 2021-12-09 ENCOUNTER — OFFICE VISIT (OUTPATIENT)
Dept: RHEUMATOLOGY | Facility: CLINIC | Age: 61
End: 2021-12-09
Payer: MEDICARE

## 2021-12-09 ENCOUNTER — PATIENT OUTREACH (OUTPATIENT)
Dept: ADMINISTRATIVE | Facility: OTHER | Age: 61
End: 2021-12-09
Payer: MEDICARE

## 2021-12-09 VITALS
BODY MASS INDEX: 29.05 KG/M2 | TEMPERATURE: 99 F | HEART RATE: 100 BPM | HEIGHT: 61 IN | WEIGHT: 153.88 LBS | DIASTOLIC BLOOD PRESSURE: 77 MMHG | OXYGEN SATURATION: 97 % | SYSTOLIC BLOOD PRESSURE: 116 MMHG | RESPIRATION RATE: 12 BRPM

## 2021-12-09 VITALS — HEIGHT: 61 IN | WEIGHT: 156 LBS | BODY MASS INDEX: 29.45 KG/M2

## 2021-12-09 DIAGNOSIS — M06.9 RHEUMATOID ARTHRITIS, INVOLVING UNSPECIFIED SITE, UNSPECIFIED WHETHER RHEUMATOID FACTOR PRESENT: Primary | ICD-10-CM

## 2021-12-09 DIAGNOSIS — M81.0 OSTEOPOROSIS, UNSPECIFIED OSTEOPOROSIS TYPE, UNSPECIFIED PATHOLOGICAL FRACTURE PRESENCE: ICD-10-CM

## 2021-12-09 DIAGNOSIS — D50.9 IRON DEFICIENCY ANEMIA, UNSPECIFIED IRON DEFICIENCY ANEMIA TYPE: Primary | ICD-10-CM

## 2021-12-09 DIAGNOSIS — M15.9 OSTEOARTHRITIS INVOLVING MULTIPLE JOINTS ON BOTH SIDES OF BODY: ICD-10-CM

## 2021-12-09 DIAGNOSIS — M79.7 FIBROMYALGIA: ICD-10-CM

## 2021-12-09 PROCEDURE — 99999 PR PBB SHADOW E&M-EST. PATIENT-LVL IV: CPT | Mod: PBBFAC,,, | Performed by: INTERNAL MEDICINE

## 2021-12-09 PROCEDURE — 99999 PR PBB SHADOW E&M-EST. PATIENT-LVL V: CPT | Mod: PBBFAC,,, | Performed by: INTERNAL MEDICINE

## 2021-12-09 PROCEDURE — 99214 OFFICE O/P EST MOD 30 MIN: CPT | Mod: S$GLB,,, | Performed by: INTERNAL MEDICINE

## 2021-12-09 PROCEDURE — 99213 PR OFFICE/OUTPT VISIT, EST, LEVL III, 20-29 MIN: ICD-10-PCS | Mod: S$GLB,,, | Performed by: INTERNAL MEDICINE

## 2021-12-09 PROCEDURE — 99214 PR OFFICE/OUTPT VISIT, EST, LEVL IV, 30-39 MIN: ICD-10-PCS | Mod: S$GLB,,, | Performed by: INTERNAL MEDICINE

## 2021-12-09 PROCEDURE — 99999 PR PBB SHADOW E&M-EST. PATIENT-LVL V: ICD-10-PCS | Mod: PBBFAC,,, | Performed by: INTERNAL MEDICINE

## 2021-12-09 PROCEDURE — 99999 PR PBB SHADOW E&M-EST. PATIENT-LVL IV: ICD-10-PCS | Mod: PBBFAC,,, | Performed by: INTERNAL MEDICINE

## 2021-12-09 PROCEDURE — 99213 OFFICE O/P EST LOW 20 MIN: CPT | Mod: S$GLB,,, | Performed by: INTERNAL MEDICINE

## 2021-12-09 RX ORDER — PREGABALIN 50 MG/1
50 CAPSULE ORAL NIGHTLY
Qty: 90 CAPSULE | Refills: 2 | Status: SHIPPED | OUTPATIENT
Start: 2021-12-09 | End: 2022-03-23 | Stop reason: SDUPTHER

## 2021-12-09 ASSESSMENT — ROUTINE ASSESSMENT OF PATIENT INDEX DATA (RAPID3)
TOTAL RAPID3 SCORE: 8.44
PATIENT GLOBAL ASSESSMENT SCORE: 9
PSYCHOLOGICAL DISTRESS SCORE: 2.2
AM STIFFNESS SCORE: 1, YES
WHEN YOU AWAKENED IN THE MORNING OVER THE LAST WEEK, PLEASE INDICATE THE AMOUNT OF TIME IT TAKES UNTIL YOU ARE AS LIMBER AS YOU WILL BE FOR THE DAY: 24
MDHAQ FUNCTION SCORE: 2.2
PAIN SCORE: 9
FATIGUE SCORE: 7.5

## 2021-12-10 ENCOUNTER — PATIENT MESSAGE (OUTPATIENT)
Dept: ORTHOPEDICS | Facility: CLINIC | Age: 61
End: 2021-12-10
Payer: MEDICARE

## 2021-12-10 ENCOUNTER — TELEPHONE (OUTPATIENT)
Dept: ORTHOPEDICS | Facility: CLINIC | Age: 61
End: 2021-12-10
Payer: MEDICARE

## 2021-12-13 ENCOUNTER — OFFICE VISIT (OUTPATIENT)
Dept: GASTROENTEROLOGY | Facility: CLINIC | Age: 61
End: 2021-12-13
Payer: MEDICARE

## 2021-12-13 ENCOUNTER — HOSPITAL ENCOUNTER (OUTPATIENT)
Dept: CARDIOLOGY | Facility: CLINIC | Age: 61
Discharge: HOME | End: 2021-12-13
Payer: MEDICARE

## 2021-12-13 ENCOUNTER — RESEARCH ENCOUNTER (OUTPATIENT)
Dept: RESEARCH | Facility: HOSPITAL | Age: 61
End: 2021-12-13

## 2021-12-13 VITALS
HEIGHT: 61 IN | BODY MASS INDEX: 29.1 KG/M2 | WEIGHT: 154.13 LBS | DIASTOLIC BLOOD PRESSURE: 79 MMHG | SYSTOLIC BLOOD PRESSURE: 114 MMHG | HEART RATE: 91 BPM

## 2021-12-13 DIAGNOSIS — K31.84 GASTROPARESIS: ICD-10-CM

## 2021-12-13 DIAGNOSIS — Z00.6 RESEARCH STUDY PATIENT: ICD-10-CM

## 2021-12-13 DIAGNOSIS — K31.84 GASTROPARESIS: Primary | ICD-10-CM

## 2021-12-13 PROCEDURE — 93005 ELECTROCARDIOGRAM TRACING: CPT | Mod: S$GLB,,, | Performed by: INTERNAL MEDICINE

## 2021-12-13 PROCEDURE — 99214 OFFICE O/P EST MOD 30 MIN: CPT | Mod: S$GLB,,, | Performed by: INTERNAL MEDICINE

## 2021-12-13 PROCEDURE — 93010 EKG 12-LEAD: ICD-10-PCS | Mod: S$GLB,,, | Performed by: INTERNAL MEDICINE

## 2021-12-13 PROCEDURE — 99999 PR PBB SHADOW E&M-EST. PATIENT-LVL V: ICD-10-PCS | Mod: PBBFAC,,, | Performed by: INTERNAL MEDICINE

## 2021-12-13 PROCEDURE — 93005 EKG 12-LEAD: ICD-10-PCS | Mod: S$GLB,,, | Performed by: INTERNAL MEDICINE

## 2021-12-13 PROCEDURE — 99999 PR PBB SHADOW E&M-EST. PATIENT-LVL V: CPT | Mod: PBBFAC,,, | Performed by: INTERNAL MEDICINE

## 2021-12-13 PROCEDURE — 93010 ELECTROCARDIOGRAM REPORT: CPT | Mod: S$GLB,,, | Performed by: INTERNAL MEDICINE

## 2021-12-13 PROCEDURE — 99214 PR OFFICE/OUTPT VISIT, EST, LEVL IV, 30-39 MIN: ICD-10-PCS | Mod: S$GLB,,, | Performed by: INTERNAL MEDICINE

## 2021-12-13 RX ORDER — FLUCONAZOLE 100 MG/1
TABLET ORAL
Qty: 14 TABLET | Refills: 0 | Status: SHIPPED | OUTPATIENT
Start: 2021-12-13 | End: 2022-01-13 | Stop reason: SDUPTHER

## 2021-12-18 DIAGNOSIS — M25.552 CHRONIC HIP PAIN, LEFT: ICD-10-CM

## 2021-12-18 DIAGNOSIS — G89.29 CHRONIC HIP PAIN, LEFT: ICD-10-CM

## 2021-12-18 RX ORDER — TRAMADOL HYDROCHLORIDE 50 MG/1
TABLET ORAL
Qty: 60 TABLET | Refills: 0 | Status: SHIPPED | OUTPATIENT
Start: 2021-12-18 | End: 2022-01-10 | Stop reason: SDUPTHER

## 2021-12-18 RX ORDER — NALOXONE HYDROCHLORIDE 4 MG/.1ML
SPRAY NASAL
Qty: 1 EACH | Refills: 11 | Status: SHIPPED | OUTPATIENT
Start: 2021-12-18 | End: 2022-01-19

## 2021-12-24 ENCOUNTER — PATIENT MESSAGE (OUTPATIENT)
Dept: GASTROENTEROLOGY | Facility: CLINIC | Age: 61
End: 2021-12-24
Payer: MEDICARE

## 2021-12-29 ENCOUNTER — TELEPHONE (OUTPATIENT)
Dept: ORTHOPEDICS | Facility: CLINIC | Age: 61
End: 2021-12-29
Payer: MEDICARE

## 2021-12-29 ENCOUNTER — PATIENT MESSAGE (OUTPATIENT)
Dept: ORTHOPEDICS | Facility: CLINIC | Age: 61
End: 2021-12-29
Payer: MEDICARE

## 2021-12-29 DIAGNOSIS — M25.512 LEFT SHOULDER PAIN: Primary | ICD-10-CM

## 2022-01-03 ENCOUNTER — SPECIALTY PHARMACY (OUTPATIENT)
Dept: PHARMACY | Facility: CLINIC | Age: 62
End: 2022-01-03
Payer: MEDICARE

## 2022-01-04 NOTE — TELEPHONE ENCOUNTER
Incoming call from patient regarding Kevzara + Forteo prescription refills. Informed pt that Kevzara yasmine must be renewed and Forteo needs a new PA. Patient states that she is uncertain if she will be continuing on Forteo as she started the medication in 1/2020 and will take her last dose of on hand medication on 1/9/2021 (2 years of therapy completed). Pt states that she will set up an appt with provider to confirm.

## 2022-01-05 ENCOUNTER — OFFICE VISIT (OUTPATIENT)
Dept: ENDOCRINOLOGY | Facility: CLINIC | Age: 62
End: 2022-01-05
Payer: MEDICARE

## 2022-01-05 DIAGNOSIS — M06.9 RHEUMATOID ARTHRITIS INVOLVING MULTIPLE SITES, UNSPECIFIED WHETHER RHEUMATOID FACTOR PRESENT: Chronic | ICD-10-CM

## 2022-01-05 DIAGNOSIS — M81.8 STEROID-INDUCED OSTEOPOROSIS: Primary | ICD-10-CM

## 2022-01-05 DIAGNOSIS — E05.90 SUBCLINICAL HYPERTHYROIDISM: ICD-10-CM

## 2022-01-05 DIAGNOSIS — T38.0X5A STEROID-INDUCED OSTEOPOROSIS: Primary | ICD-10-CM

## 2022-01-05 DIAGNOSIS — E04.1 THYROID NODULE: Chronic | ICD-10-CM

## 2022-01-05 DIAGNOSIS — M84.352A STRESS FRACTURE OF NECK OF LEFT FEMUR: ICD-10-CM

## 2022-01-05 PROBLEM — M84.353G: Status: RESOLVED | Noted: 2019-05-20 | Resolved: 2022-01-05

## 2022-01-05 PROCEDURE — 99214 OFFICE O/P EST MOD 30 MIN: CPT | Mod: 95,,, | Performed by: INTERNAL MEDICINE

## 2022-01-05 PROCEDURE — 1159F PR MEDICATION LIST DOCUMENTED IN MEDICAL RECORD: ICD-10-PCS | Mod: CPTII,95,, | Performed by: INTERNAL MEDICINE

## 2022-01-05 PROCEDURE — 99214 PR OFFICE/OUTPT VISIT, EST, LEVL IV, 30-39 MIN: ICD-10-PCS | Mod: 95,,, | Performed by: INTERNAL MEDICINE

## 2022-01-05 PROCEDURE — 1159F MED LIST DOCD IN RCRD: CPT | Mod: CPTII,95,, | Performed by: INTERNAL MEDICINE

## 2022-01-05 PROCEDURE — 1160F PR REVIEW ALL MEDS BY PRESCRIBER/CLIN PHARMACIST DOCUMENTED: ICD-10-PCS | Mod: CPTII,95,, | Performed by: INTERNAL MEDICINE

## 2022-01-05 PROCEDURE — 1160F RVW MEDS BY RX/DR IN RCRD: CPT | Mod: CPTII,95,, | Performed by: INTERNAL MEDICINE

## 2022-01-05 NOTE — ASSESSMENT & PLAN NOTE
Increases risk for fracture  On chronic corticosteroids, which is her main risk factor for osteoporosis and fractures.

## 2022-01-05 NOTE — ASSESSMENT & PLAN NOTE
Patient has glucocorticoid induced osteoporosis; Fracture risk remains high    She has now completed 2 years of Forteo. Will discontinue for now with the option of restarting therapy if she does sustain another fracture. Alternatively, we could switch to Evenity for 1 year if she does sustain another fracture.    Continue Prolia q6 months indefinitely.    Continue Vit D + Calcium at current doses.    Check DXA in 3/2022 - need to check more frequently then every 2 years due to high fracture risk and to evaluate efficacy of therapy.
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
## 2022-01-05 NOTE — Clinical Note
Recall for RTC in 1 year Schedule DXA in March, 2022 at [facility] (after 3/17/22) - patient prefers Tues, Wed or Thursday appointment.

## 2022-01-05 NOTE — ASSESSMENT & PLAN NOTE
Intermittent subclinical disease for several years with recent values normal  Previous imaging consistent with Graves' disease  Repeat TSH yearly.  Would have low threshold to treat given osteoporosis and multiple fractures

## 2022-01-06 ENCOUNTER — OFFICE VISIT (OUTPATIENT)
Dept: ORTHOPEDICS | Facility: CLINIC | Age: 62
End: 2022-01-06
Payer: MEDICARE

## 2022-01-06 VITALS
BODY MASS INDEX: 29.07 KG/M2 | HEART RATE: 105 BPM | HEIGHT: 61 IN | SYSTOLIC BLOOD PRESSURE: 133 MMHG | WEIGHT: 154 LBS | DIASTOLIC BLOOD PRESSURE: 81 MMHG

## 2022-01-06 DIAGNOSIS — M75.32 CALCIFIC TENDINITIS OF LEFT SHOULDER: Primary | ICD-10-CM

## 2022-01-06 PROCEDURE — 3079F PR MOST RECENT DIASTOLIC BLOOD PRESSURE 80-89 MM HG: ICD-10-PCS | Mod: CPTII,S$GLB,, | Performed by: ORTHOPAEDIC SURGERY

## 2022-01-06 PROCEDURE — 1159F MED LIST DOCD IN RCRD: CPT | Mod: CPTII,S$GLB,, | Performed by: ORTHOPAEDIC SURGERY

## 2022-01-06 PROCEDURE — 3075F PR MOST RECENT SYSTOLIC BLOOD PRESS GE 130-139MM HG: ICD-10-PCS | Mod: CPTII,S$GLB,, | Performed by: ORTHOPAEDIC SURGERY

## 2022-01-06 PROCEDURE — 3079F DIAST BP 80-89 MM HG: CPT | Mod: CPTII,S$GLB,, | Performed by: ORTHOPAEDIC SURGERY

## 2022-01-06 PROCEDURE — 20610 LARGE JOINT ASPIRATION/INJECTION: L SUBACROMIAL BURSA: ICD-10-PCS | Mod: LT,S$GLB,, | Performed by: ORTHOPAEDIC SURGERY

## 2022-01-06 PROCEDURE — 3075F SYST BP GE 130 - 139MM HG: CPT | Mod: CPTII,S$GLB,, | Performed by: ORTHOPAEDIC SURGERY

## 2022-01-06 PROCEDURE — 99999 PR PBB SHADOW E&M-EST. PATIENT-LVL IV: CPT | Mod: PBBFAC,,, | Performed by: ORTHOPAEDIC SURGERY

## 2022-01-06 PROCEDURE — 20610 DRAIN/INJ JOINT/BURSA W/O US: CPT | Mod: LT,S$GLB,, | Performed by: ORTHOPAEDIC SURGERY

## 2022-01-06 PROCEDURE — 1159F PR MEDICATION LIST DOCUMENTED IN MEDICAL RECORD: ICD-10-PCS | Mod: CPTII,S$GLB,, | Performed by: ORTHOPAEDIC SURGERY

## 2022-01-06 PROCEDURE — 99999 PR PBB SHADOW E&M-EST. PATIENT-LVL IV: ICD-10-PCS | Mod: PBBFAC,,, | Performed by: ORTHOPAEDIC SURGERY

## 2022-01-06 PROCEDURE — 99214 OFFICE O/P EST MOD 30 MIN: CPT | Mod: 25,S$GLB,, | Performed by: ORTHOPAEDIC SURGERY

## 2022-01-06 PROCEDURE — 3008F PR BODY MASS INDEX (BMI) DOCUMENTED: ICD-10-PCS | Mod: CPTII,S$GLB,, | Performed by: ORTHOPAEDIC SURGERY

## 2022-01-06 PROCEDURE — 99214 PR OFFICE/OUTPT VISIT, EST, LEVL IV, 30-39 MIN: ICD-10-PCS | Mod: 25,S$GLB,, | Performed by: ORTHOPAEDIC SURGERY

## 2022-01-06 PROCEDURE — 3008F BODY MASS INDEX DOCD: CPT | Mod: CPTII,S$GLB,, | Performed by: ORTHOPAEDIC SURGERY

## 2022-01-06 RX ADMIN — TRIAMCINOLONE ACETONIDE 80 MG: 40 INJECTION, SUSPENSION INTRA-ARTICULAR; INTRAMUSCULAR at 11:01

## 2022-01-06 NOTE — TELEPHONE ENCOUNTER
Patient has completed 2 years of therapy for forteo. Prescription was d/c and chart notes from 1/5 can confirm. Will have [facility] close patient out for osteoporosis treatment.

## 2022-01-06 NOTE — PROCEDURES
Large Joint Aspiration/Injection: L subacromial bursa    Date/Time: 1/6/2022 11:30 AM  Performed by: Staci Yarbrough MD  Authorized by: Staci Yarbrough MD     Consent Done?:  Yes (Verbal)  Indications:  Joint swelling  Timeout: prior to procedure the correct patient, procedure, and site was verified    Prep: patient was prepped and draped in usual sterile fashion      Local anesthesia used?: Yes    Anesthesia:  Local infiltration  Local anesthetic:  Lidocaine 1% without epinephrine    Details:  Needle Size:  22 G  Approach:  Posterior  Location:  Shoulder  Site:  L subacromial bursa  Medications:  80 mg triamcinolone acetonide 40 mg/mL

## 2022-01-10 ENCOUNTER — PATIENT MESSAGE (OUTPATIENT)
Dept: PAIN MEDICINE | Facility: CLINIC | Age: 62
End: 2022-01-10
Payer: MEDICARE

## 2022-01-10 DIAGNOSIS — G89.29 CHRONIC HIP PAIN, LEFT: ICD-10-CM

## 2022-01-10 DIAGNOSIS — M25.552 CHRONIC HIP PAIN, LEFT: ICD-10-CM

## 2022-01-10 RX ORDER — TRAMADOL HYDROCHLORIDE 50 MG/1
TABLET ORAL
Qty: 60 TABLET | Refills: 0 | Status: SHIPPED | OUTPATIENT
Start: 2022-01-17 | End: 2022-03-08 | Stop reason: SDUPTHER

## 2022-01-10 RX ORDER — TRIAMCINOLONE ACETONIDE 40 MG/ML
80 INJECTION, SUSPENSION INTRA-ARTICULAR; INTRAMUSCULAR
Status: DISCONTINUED | OUTPATIENT
Start: 2022-01-06 | End: 2022-01-10 | Stop reason: HOSPADM

## 2022-01-10 NOTE — TELEPHONE ENCOUNTER
Patient requesting refill on Tramadol 50mg  Last office visit 12.08.21   shows last refill on 12.18.21  Patient does not have a pain contract on file with [facility] department  Patient last UDS No UDS on file was not consistent with current therapy

## 2022-01-10 NOTE — TELEPHONE ENCOUNTER
Pt requesting status of Kevzara. Explained $55 copay @004Dom Rafaela BETHEL Azam has [id number]     She reports she is due for dose 1/17/22 and states she spoke with someone last week. Explained it still needs to be worked on and will forward message to FA team for urgent completion. Order pending in FA queue. -- Per Dom king 3/2/2022 Pt does not meet programs financial criteria and denied pt assistance enrollment
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
## 2022-01-11 ENCOUNTER — PATIENT MESSAGE (OUTPATIENT)
Dept: UROLOGY | Facility: CLINIC | Age: 62
End: 2022-01-11
Payer: MEDICARE

## 2022-01-11 ENCOUNTER — TELEPHONE (OUTPATIENT)
Dept: UROLOGY | Facility: CLINIC | Age: 62
End: 2022-01-11
Payer: MEDICARE

## 2022-01-11 DIAGNOSIS — N39.0 RECURRENT UTI: Primary | ICD-10-CM

## 2022-01-13 ENCOUNTER — PATIENT MESSAGE (OUTPATIENT)
Dept: UROLOGY | Facility: CLINIC | Age: 62
End: 2022-01-13
Payer: MEDICARE

## 2022-01-13 ENCOUNTER — TELEPHONE (OUTPATIENT)
Dept: UROLOGY | Facility: CLINIC | Age: 62
End: 2022-01-13
Payer: MEDICARE

## 2022-01-13 DIAGNOSIS — R35.1 NOCTURIA: ICD-10-CM

## 2022-01-13 DIAGNOSIS — R30.0 DYSURIA: Primary | ICD-10-CM

## 2022-01-13 DIAGNOSIS — R35.0 URINARY FREQUENCY: ICD-10-CM

## 2022-01-13 DIAGNOSIS — N39.0 RECURRENT UTI: ICD-10-CM

## 2022-01-13 RX ORDER — FLUCONAZOLE 100 MG/1
TABLET ORAL
Qty: 14 TABLET | Refills: 0 | Status: SHIPPED | OUTPATIENT
Start: 2022-01-13 | End: 2022-02-01

## 2022-01-13 NOTE — TELEPHONE ENCOUNTER
----- Message from Lena Perez sent at 1/13/2022  7:54 AM CST -----  Contact: Pt  Type:  Sooner Appoointment Request    Caller is requesting a sooner appointment.  Caller declined first available appointment listed below.  Caller will not accept being placed on the waitlist and is requesting a message be sent to doctor.  Name of Caller:Pt  When is the first available appointment?05/2022  Symptoms:possible bladder infection  Would the patient rather a call back or a response via [facility]? Call back  Best Call Back Number:[phone]  Additional Information:

## 2022-01-13 NOTE — TELEPHONE ENCOUNTER
Spoke with pt regarding yasmine denial. Pt did not qualify for new yasmine due to income. Pt able to pay $55 copay. Refill set up.

## 2022-01-13 NOTE — TELEPHONE ENCOUNTER
Specialty Pharmacy - Refill Coordination  Specialty Pharmacy - Medication/Referral Authorization    Specialty Medication Orders Linked to Encounter    Flowsheet Row Most Recent Value   Medication #1 sarilumab (KEVZARA) 200 mg/1.14 mL Syrg (Order#[id number], Rx#[id number])          Refill Questions - Documented Responses    Flowsheet Row Most Recent Value   Patient Availability and HIPAA Verification    Does patient want to proceed with activity? Yes   HIPAA/medical authority confirmed? Yes   Relationship to patient of person spoken to? Self   Refill Screening Questions    Changes to allergies? No   Changes to medications? No   New conditions since last clinic visit? No   Unplanned office visit, urgent care, ED, or hospital admission in the last 4 weeks? No   How does patient/caregiver feel medication is working? Good   Financial problems or insurance changes? No   How many doses of your specialty medications were missed in the last 4 weeks? 0   Would patient like to speak to a pharmacist? No   When does the patient need to receive the medication? 01/17/22   Refill Delivery Questions    How will the patient receive the medication? Delivery Jaymie   When does the patient need to receive the medication? 01/17/22   Shipping Address Home   Address in [facility] confirmed and updated if neccessary? Yes   Expected Copay ($) 55   Is the patient able to afford the medication copay? Yes   Payment Method CC on file   Days supply of Refill 28   Supplies needed? No supplies needed   Refill activity completed? Yes   Refill activity plan Refill scheduled   Shipment/Pickup Date: 01/14/22          Current Outpatient Medications   Medication Sig    albuterol (PROVENTIL HFA) 90 mcg/actuation inhaler Inhale 2 puffs into the lungs every 6 (six) hours as needed. Rescue    albuterol-ipratropium (DUO-NEB) 2.5 mg-0.5 mg/3 mL nebulizer solution Take 3 mLs by nebulization every 6 (six) hours as needed for Wheezing. Rescue    aspirin  81 mg Tab Take 81 mg by mouth every morning.     budesonide-formoterol 160-4.5 mcg (SYMBICORT) 160-4.5 mcg/actuation HFAA Inhale 2 puffs into the lungs every 12 (twelve) hours.    calcium citrate-vitamin D3 315-200 mg (CITRACAL+D) 315 mg-5 mcg (200 unit) per tablet Take 1 tablet by mouth 2 (two) times daily.     conjugated estrogens (PREMARIN) vaginal cream insert 1 gram vaginally as directed    cyclobenzaprine (FLEXERIL) 5 MG tablet Take 1 tablet (5 mg total) by mouth nightly as needed for Muscle spasms. May take 1 additional 5mg tab nightly(total 10mg)  if needed    cycloSPORINE (RESTASIS) 0.05 % ophthalmic emulsion Instill one drop into both eyes two times per day    diclofenac sodium (VOLTAREN) 1 % Gel APPLY 2 GRAMS TOPICALLY 4 (FOUR) TIMES DAILY AS NEEDED.    estrogens, conjugated, (PREMARIN) 0.625 MG tablet Take 1 tablet by mouth daily.    estrogens, conjugated, (PREMARIN) 0.625 MG tablet take 1 tablet by mouth daily    fluconazole (DIFLUCAN) 100 MG tablet TAKE 1 TABLET (100 MG TOTAL) BY MOUTH ONCE. FOR 1 DOSE    fluconazole (DIFLUCAN) 100 MG tablet Take 1 tablet by mouth once daily for 14 days    INV PROPULSID 10 MG Take 1 tablets (20 mg) by mouth 4 (four) times daily. FOR INVESTIGATIONAL USE ONLY. Protocol [id number]    leflunomide (ARAVA) 20 MG Tab TAKE 1 TABLET BY MOUTH EVERY DAY    levoFLOXacin (LEVAQUIN) 500 MG tablet Take 1 tablet (500 mg total) by mouth once daily.    lifitegrast (XIIDRA) 5 % Dpet INSTILL ONE DROPP INTO BOTH EYES TWICE A DAY    mirabegron (MYRBETRIQ) 25 mg Tb24 ER tablet Take 1 tablet (25 mg total) by mouth once daily.    montelukast (SINGULAIR) 10 mg tablet Take 1 tablet (10 mg total) by mouth every evening.    montelukast (SINGULAIR) 10 mg tablet Take 1 tablet (10 mg total) by mouth nightly.    naloxone (NARCAN) 4 mg/actuation Spry 4mg by nasal route as needed for opioid overdose; may repeat every 2-3 minutes in alternating nostrils until medical help arrives. Call  "[phone]    naproxen (NAPROSYN) 500 MG tablet TAKE 1 TABLET BY MOUTH TWICE A DAY AS NEEDED    omeprazole (PRILOSEC) 40 MG capsule Take 1 capsule (40 mg total) by mouth every morning.    omeprazole-sodium bicarbonate (ZEGERID) 40-1.1 mg-gram per capsule TAKE 1 CAPSULE BY MOUTH EVERY DAY IN THE MORNING    POTASSIUM ORAL Take by mouth once daily.    predniSONE (DELTASONE) 5 MG tablet TAKE 1 TABLET BY MOUTH EVERY DAY    pregabalin (LYRICA) 50 MG capsule Take 1 capsule (50 mg total) by mouth every evening. May increase to 2 caps(100mg) nightly after 1 wk. If tolerated may increase to 3 caps(150mg) nightly after additional week    PREMARIN 0.625 mg tablet Take 0.625 mg by mouth once daily.    PREMARIN vaginal cream PLACE 0.5 GRAM VAGINALLY 3 (THREE) TIMES A WEEK.    progesterone (PROMETRIUM) 100 MG capsule Take 100 mg by mouth every morning. 1 Capsule Oral Every day, morning    progesterone (PROMETRIUM) 100 MG capsule Take 1 capsule by mouth daily.    progesterone (PROMETRIUM) 100 MG capsule take 1 capsule by mouth daily    promethazine (PHENERGAN) 25 MG tablet Take 1 tablet (25 mg total) by mouth every 6 (six) hours as needed for Nausea.    rosuvastatin (CRESTOR) 20 MG tablet Take 1 tablet (20 mg total) by mouth every evening.    sarilumab (KEVZARA) 200 mg/1.14 mL Syrg Inject 1.14 mLs (200 mg total) into the skin every 14 (fourteen) days.    sucralfate (CARAFATE) 1 gram tablet Take 1 tablet (1 g total) by mouth 4 (four) times daily.    syringe with needle (TUBERCULIN SYRINGE) 1 mL 27 x 1/2" Syrg To administer methotrexate 7.5mg sc once a week    [START ON 1/17/2022] traMADoL (ULTRAM) 50 mg tablet TAKE 1 TABLET (50 MG TOTAL) BY MOUTH EVERY 12 (TWELVE) HOURS AS NEEDED FOR PAIN.   Last reviewed on 1/6/2022 11:30 AM by Francesca Hughes MA    Review of patient's allergies indicates:   Allergen Reactions    Actemra [tocilizumab] Other (See Comments)     Severe dizziness    Codeine Nausea And Vomiting    Gold au 198 " "Hives and Rash    Hydroxychloroquine Other (See Comments)     Can't remember the reaction      Iodinated contrast media Other (See Comments)     Other reaction(s): BURNING ALL OVER    Iodine Other (See Comments)     Other reaction(s): BURNING ALL OVER - Iodine dye - Not topical    Sulfa (sulfonamide antibiotics) Other (See Comments)     Can't remember the reaction    Zofran [ondansetron hcl (pf)] Nausea And Vomiting     Pt reports that last time she received zofran she started vomiting again    Methotrexate analogues Nausea Only    Pneumovax 23 [pneumococcal 23-argenis ps vaccine] Other (See Comments)     "sick"    Last reviewed on  1/10/2022 2:17 PM by Tj Mayfield      Tasks added this encounter   1/3/2022 - Referral Authorization  2/7/2022 - Refill Call (Auto Added)   Tasks due within next 3 months   No tasks due.     [facility]  [address] [place] [id number]  Phone: [phone]  Fax: [phone]      "

## 2022-01-13 NOTE — TELEPHONE ENCOUNTER
Incoming call from patient calling to check on the status of yasmine renewal for Kevzara. Reached out to PCA team, who informed me that yasmine renewal was denied. Informed patient of denial and she questioned the reasoning behind denial as she has been receiving the yasmine for years. Call was transferred to Corrina HARRINGTON to further discuss with patient.

## 2022-01-14 RX ORDER — LEVOFLOXACIN 500 MG/1
500 TABLET, FILM COATED ORAL DAILY
Qty: 7 TABLET | Refills: 1 | Status: SHIPPED | OUTPATIENT
Start: 2022-01-14 | End: 2022-01-21

## 2022-01-17 ENCOUNTER — PATIENT MESSAGE (OUTPATIENT)
Dept: UROLOGY | Facility: CLINIC | Age: 62
End: 2022-01-17
Payer: MEDICARE

## 2022-01-18 ENCOUNTER — PATIENT MESSAGE (OUTPATIENT)
Dept: RHEUMATOLOGY | Facility: CLINIC | Age: 62
End: 2022-01-18
Payer: MEDICARE

## 2022-01-18 ENCOUNTER — PROCEDURE VISIT (OUTPATIENT)
Dept: HEPATOLOGY | Facility: CLINIC | Age: 62
End: 2022-01-18
Payer: MEDICARE

## 2022-01-18 ENCOUNTER — OFFICE VISIT (OUTPATIENT)
Dept: HEPATOLOGY | Facility: CLINIC | Age: 62
End: 2022-01-18
Payer: MEDICARE

## 2022-01-18 VITALS
RESPIRATION RATE: 18 BRPM | OXYGEN SATURATION: 94 % | BODY MASS INDEX: 28.8 KG/M2 | WEIGHT: 152.56 LBS | SYSTOLIC BLOOD PRESSURE: 147 MMHG | TEMPERATURE: 98 F | DIASTOLIC BLOOD PRESSURE: 78 MMHG | HEART RATE: 102 BPM | HEIGHT: 61 IN

## 2022-01-18 DIAGNOSIS — Z79.631 LONG TERM METHOTREXATE USER: ICD-10-CM

## 2022-01-18 DIAGNOSIS — R74.8 ELEVATED SERUM GGT LEVEL: ICD-10-CM

## 2022-01-18 DIAGNOSIS — R74.8 ELEVATED SERUM GGT LEVEL: Primary | ICD-10-CM

## 2022-01-18 DIAGNOSIS — Z00.00 HEALTHCARE MAINTENANCE: ICD-10-CM

## 2022-01-18 DIAGNOSIS — R79.9 ABNORMAL FINDING OF BLOOD CHEMISTRY, UNSPECIFIED: ICD-10-CM

## 2022-01-18 DIAGNOSIS — Z92.21 HISTORY OF METHOTREXATE THERAPY: ICD-10-CM

## 2022-01-18 PROCEDURE — 3077F PR MOST RECENT SYSTOLIC BLOOD PRESSURE >= 140 MM HG: ICD-10-PCS | Mod: CPTII,S$GLB,, | Performed by: NURSE PRACTITIONER

## 2022-01-18 PROCEDURE — 3008F BODY MASS INDEX DOCD: CPT | Mod: CPTII,S$GLB,, | Performed by: NURSE PRACTITIONER

## 2022-01-18 PROCEDURE — 99999 PR PBB SHADOW E&M-EST. PATIENT-LVL V: CPT | Mod: PBBFAC,,, | Performed by: NURSE PRACTITIONER

## 2022-01-18 PROCEDURE — 1159F MED LIST DOCD IN RCRD: CPT | Mod: CPTII,S$GLB,, | Performed by: NURSE PRACTITIONER

## 2022-01-18 PROCEDURE — 1159F PR MEDICATION LIST DOCUMENTED IN MEDICAL RECORD: ICD-10-PCS | Mod: CPTII,S$GLB,, | Performed by: NURSE PRACTITIONER

## 2022-01-18 PROCEDURE — 99214 OFFICE O/P EST MOD 30 MIN: CPT | Mod: S$GLB,,, | Performed by: NURSE PRACTITIONER

## 2022-01-18 PROCEDURE — 99214 PR OFFICE/OUTPT VISIT, EST, LEVL IV, 30-39 MIN: ICD-10-PCS | Mod: S$GLB,,, | Performed by: NURSE PRACTITIONER

## 2022-01-18 PROCEDURE — 3008F PR BODY MASS INDEX (BMI) DOCUMENTED: ICD-10-PCS | Mod: CPTII,S$GLB,, | Performed by: NURSE PRACTITIONER

## 2022-01-18 PROCEDURE — 3078F DIAST BP <80 MM HG: CPT | Mod: CPTII,S$GLB,, | Performed by: NURSE PRACTITIONER

## 2022-01-18 PROCEDURE — 1160F RVW MEDS BY RX/DR IN RCRD: CPT | Mod: CPTII,S$GLB,, | Performed by: NURSE PRACTITIONER

## 2022-01-18 PROCEDURE — 3078F PR MOST RECENT DIASTOLIC BLOOD PRESSURE < 80 MM HG: ICD-10-PCS | Mod: CPTII,S$GLB,, | Performed by: NURSE PRACTITIONER

## 2022-01-18 PROCEDURE — 1160F PR REVIEW ALL MEDS BY PRESCRIBER/CLIN PHARMACIST DOCUMENTED: ICD-10-PCS | Mod: CPTII,S$GLB,, | Performed by: NURSE PRACTITIONER

## 2022-01-18 PROCEDURE — 3077F SYST BP >= 140 MM HG: CPT | Mod: CPTII,S$GLB,, | Performed by: NURSE PRACTITIONER

## 2022-01-18 PROCEDURE — 99999 PR PBB SHADOW E&M-EST. PATIENT-LVL V: ICD-10-PCS | Mod: PBBFAC,,, | Performed by: NURSE PRACTITIONER

## 2022-01-18 RX ORDER — PREDNISONE 1 MG/1
4 TABLET ORAL DAILY
COMMUNITY
Start: 2021-12-13 | End: 2022-03-23

## 2022-01-18 NOTE — PROGRESS NOTES
HEPATOLOGY CLINIC VISIT NOTE     REFERRING PROVIDER: No ref. provider found    REASON FOR VISIT: Elevated GGT    Mrs. Peterson is a very pleasant 57-year-old lady with history of long-standing RA and Sjogren's syndrome, previously treated with low-dose methotrexate for 10 years. She was last seen in clinic by myself in 3/2021. She was initially referred to Hepatology in 2018 for isolated elevation of GGT. Abdominal ultrasound at that time showed no significant liver or biliary abnormalities. She underwent Fibroscan due to elevated GGT and long term use of MTX. The Fibroscan was suggestive of no hepatic steatosis, with F0-F1 fibrosis, and a low likelihood of cirrhosis. AMA to screen for PBC was also negative. The GGT eventually trended down, and the remainder of her LFT's remained normal/stable over the last 2-3 years, with an occasional mild elevation in ALT and AST noted. She has a strong family history of Alcohol abuse, but denies any personal history of heavy alcohol intake. She consumes limited quantities of alcohol 2-3 times per year. She discontinued MTX in 2018/2019, and is now on Kevzara, Leflunomide and low dose Prednisone for RA. She also takes Naproxen and Tramadol PRN. Repeat Fibroscan in 3/2021 was suggestive of no progression in fibrosis or steatosis. Most recent GGT in 12/2021 was again elevated at 224, likely secondary to use of high risk medications; the rest of her LFT's are normal. Abdominal ultrasound on 1/11/2022 showed no acute findings. She is well appearing, and denies any signs or symptoms of advanced liver disease including jaundice, abdominal distention, hematochezia or melena, or periods of confusion suggestive of hepatic encephalopathy. No abnormal skin rashes or itching.    Past Medical History:   Diagnosis Date    Acid reflux     Allergy     Anemia     Asthma     Coronary artery disease     Degenerative disc disease     Dry eyes     Dry mouth     Gastroparesis      Hyperlipidemia     Lateral meniscus derangement 4/6/2016    Lobular carcinoma in situ     Osteoarthritis     Osteoporosis     Rheumatoid arthritis(714.0)     Umbilical hernia 8/13/2015     Past Surgical History:   Procedure Laterality Date    BREAST BIOPSY Left 01/29/2002    core bx    CARPAL TUNNEL RELEASE Right 05/2017    CHOLECYSTECTOMY  2004    COLONOSCOPY      10/11    COLONOSCOPY N/A 6/29/2017    Procedure: COLONOSCOPY;  Surgeon: López Moore MD;  Location: [facility] ENDO ([address] [place] [address]);  Service: Endoscopy;  Laterality: N/A;    EPIDURAL STEROID INJECTION N/A 11/18/2021    Procedure: INJECTION, STEROID, EPIDURAL, L5-S1 IL NEED CONSENT;  Surgeon: Tj Mayfield MD;  Location: [facility] PAIN MGT;  Service: Pain Management;  Laterality: N/A;    INJECTION OF ANESTHETIC AGENT AROUND NERVE Bilateral 8/23/2021    Procedure: BLOCK, NERVE, MEDIAL BRANCH L3,L4,L5;  Surgeon: Tj Mayfield MD;  Location: [facility] PAIN MGT;  Service: Pain Management;  Laterality: Bilateral;  1 of 2    INJECTION OF ANESTHETIC AGENT AROUND NERVE Bilateral 8/26/2021    Procedure: BLOCK, NERVE, MEDIAL BRANCH L3,L4,L5;  Surgeon: Tj Mayfield MD;  Location: [facility] PAIN MGT;  Service: Pain Management;  Laterality: Bilateral;  2 of 2    INJECTION OF FACET JOINT Bilateral 3/12/2020    Procedure: FACET JOINT INJECTION (LUMBAR BLOCK) BILATERAL L4-5 AND L5-S1 DIRECT REFERRAL;  Surgeon: Tj Mayfield MD;  Location: [facility] PAIN MGT;  Service: Pain Management;  Laterality: Bilateral;  NEEDS CONSENT    INJECTION OF FACET JOINT Bilateral 3/29/2021    Procedure: INJECTION, FACET JOINT L3/4, L4/5, L5/S1;  Surgeon: Tj Mayfield MD;  Location: [facility] PAIN MGT;  Service: Pain Management;  Laterality: Bilateral;    INJECTION OF JOINT Right 7/30/2020    Procedure: INJECTION, JOINT, RIGHT HIP Interarticular under flouro;  Surgeon: Tj Mayfield MD;  Location: [facility] PAIN MGT;  Service: Pain Management;  Laterality: Right;  INJECTION, JOINT, RIGHT HIP Interarticular under  flouro    INTRAMEDULLARY RODDING OF FEMUR Left 5/21/2019    Procedure: INSERTION, INTRAMEDULLARY ARIEL, FEMUR;  Surgeon: López Duff MD;  Location: [facility] OR 2ND FLR;  Service: Orthopedics;  Laterality: Left;    RADIOFREQUENCY ABLATION Left 9/20/2021    Procedure: RADIOFREQUENCY ABLATION left L3,4,5 RFA  1 of 2  CONSENT NEEDED;  Surgeon: Tj Mayfield MD;  Location: [facility] PAIN MGT;  Service: Pain Management;  Laterality: Left;    RADIOFREQUENCY ABLATION Right 10/4/2021    Procedure: RADIOFREQUENCY ABLATION  right L3,4,5 RFA   2 of 2  CONSENT NEEDED;  Surgeon: Tj Mayfield MD;  Location: [facility] PAIN MGT;  Service: Pain Management;  Laterality: Right;    REPAIR OF TRICEPS TENDON Left 10/17/2018    Procedure: REPAIR, TENDON, TRICEPS left elbow;  Surgeon: Staci Yarbrough MD;  Location: [facility] OR 1ST FLR;  Service: Orthopedics;  Laterality: Left;  Anesthesia: General and regional. PRONE, k-wire , hand pan 1 and pan 2, CALL ARTHREX/Tamera notified 10-12 LO    TONSILLECTOMY      TRANSFORAMINAL EPIDURAL INJECTION OF STEROID Right 8/31/2020    Procedure: INJECTION, STEROID, EPIDURAL, TRANSFORAMINAL,  APPROACH, L3-L4 and L4-L5 need consent;  Surgeon: Tj Mayfield MD;  Location: [facility] PAIN MGT;  Service: Pain Management;  Laterality: Right;    TRANSFORAMINAL EPIDURAL INJECTION OF STEROID Bilateral 7/23/2021    Procedure: INJECTION, STEROID, EPIDURAL, TRANSFORAMINAL APPROACH L4/5;  Surgeon: Tj Mayfield MD;  Location: [facility] PAIN MGT;  Service: Pain Management;  Laterality: Bilateral;    TRIGGER POINT INJECTION N/A 7/23/2021    Procedure: INJECTION, TRIGGER POINT SCAPULAR;  Surgeon: Tj Mayfield MD;  Location: [facility] PAIN MGT;  Service: Pain Management;  Laterality: N/A;    TUBAL LIGATION  2003    UPPER GASTROINTESTINAL ENDOSCOPY      10/11    uterine ablation  2003     FAMILY HISTORY: Negative for liver disease  ETOH abuse in family   SOCIAL HISTORY:   Social History     Tobacco Use   Smoking Status Never Smoker  "  Smokeless Tobacco Never Used     Social History     Substance and Sexual Activity   Alcohol Use Yes    Comment: occasionally       Social History     Substance and Sexual Activity   Drug Use No     ROS:   No fever, chills, weight loss  No chest pain, dyspnea, cough  No abdominal pain,  nausea, vomiting  No headaches, visual changes  No lower extremity edema  No depression or anxiety    PHYSICAL EXAM:  Friendly White female, in no acute distress; alert and oriented to person, place and time.  VITALS: BP (!) 147/78 (BP Location: Right arm, Patient Position: Sitting, BP Method: Medium (Automatic))   Pulse 102   Temp 97.8 °F (36.6 °C) (Oral)   Resp 18   Ht 5' 1" (1.549 m)   Wt 69.2 kg (152 lb 8.9 oz)   LMP  (LMP Unknown)   SpO2 (!) 94%   BMI 28.83 kg/m²   HEENT: Sclerae anicteric.   NECK: No obvious masses.  CVS: No peripheral edema.  LUNGS: Normal respiratory effort.  ABDOMEN: Flat, soft, nontender.   SKIN: Warm and dry. No jaundice, No obvious rashes.   EXTREMITIES: No lower extremity edema.  NEURO/PSYCH: Normal gate. Memory intact. Thought and speech pattern appropriate. Behavior normal. No depression or anxiety noted.    RECENT LABS:  Lab Results   Component Value Date    WBC 4.54 12/13/2021    HGB 12.8 12/13/2021     12/13/2021     Lab Results   Component Value Date    INR 1.0 12/16/2015     Lab Results   Component Value Date    AST 27 12/13/2021    ALT 31 12/13/2021    BILITOT 0.5 12/13/2021    ALBUMIN 3.6 12/13/2021    ALKPHOS 78 12/13/2021    CREATININE 0.7 12/13/2021    BUN 12 12/13/2021     12/13/2021    K 3.5 12/13/2021     DIAGNOSTIC IMAGING:    US ABDOMEN COMPLETE 7/16/2018:    FINDINGS:    The visualized pancreas is within normal limits.  The aorta is not aneurysmal.  The common duct measures 5 mm.  The gallbladder is absent.     Liver size is normal, 14.3 cm, and the parenchyma demonstrates no abnormality.  H RI is 0.95.     Visualized inferior vena cava is unremarkable.     Kidney " sizes are normal, 11.7 cm on the right and 11.6 cm on the left.  These were better evaluated on recent renal ultrasound.     The spleen is not enlarged, 7.5 cm.     IMPRESSION:      No finding to correlate with the history.     Cholecystectomy.     FIBROSCAN 2018:    Fibroscan reading: 3.4 KPa     Fibrosis:F 0-1      CAP readin dB/m     Steatosis: :<S1    US ABDOMEN COMPLETE 2021:    FINDINGS:  Pancreas: The visualized portions of pancreas appear normal     Aorta: No aneurysm visualized.     Inferior vena cava: Visualized portions are normal in appearance.     Liver: Normal in size, measuring 13.2 cm. Homogeneous echotexture with no focal hepatic lesions visualized.     Biliary system: Prior cholecystectomy.  The common duct is not dilated, measuring 2.4 mm.  No intrahepatic ductal dilatation.     Right kidney: Normal in size, measuring 11.3 cm with no hydronephrosis.     Left kidney: Normal in size, measuring 11.3 cm with no hydronephrosis.     Spleen: Normal in size, measuring 8.6 cm with a homogeneous echotexture.     Miscellaneous: No ascites.     Impression:     1. Prior cholecystectomy  2. No acute findings    FIBROSCAN 3/17/2021:    Findings  Median liver stiffness score:  5.8  CAP Reading: dB/m:  190     IQR/med %:  17  Interpretation  Fibrosis interpretation is based on medial liver stiffness - Kilopascal (kPa).     Fibrosis Stage:  F 0-1  Steatosis interpretation is based on controlled attenuation parameter - (dB/m).     Steatosis Grade:  <S1    US Abdomen Complete  Narrative: EXAMINATION:  US ABDOMEN COMPLETE    CLINICAL HISTORY:  Abnormal levels of other serum enzymes    TECHNIQUE:  Complete abdominal ultrasound (including pancreas, aorta, liver, gallbladder, common bile duct, IVC, kidneys, and spleen) was performed.    COMPARISON:  2021    FINDINGS:  Pancreas: The visualized portions of pancreas appear normal.    Aorta: No aneurysm.    Gallbladder: The gallbladder is surgically  absent.    Biliary system: 5.5 mm common bile duct.  No intrahepatic ductal dilatation.    Liver: 14.1 cm, homogeneous parenchymal echotexture. No focal lesions.    Inferior vena cava: Normal in appearance.    Right kidney: 11 cm. No hydronephrosis.    Left kidney: 11.5 cm. No hydronephrosis.    Spleen: 8 cm.  Normal in size with homogeneous echotexture.    Miscellaneous: No ascites.  Impression: No sonographic abnormality demonstrated.    Electronically signed by: Donovan Santos MD  Date:    01/11/2022  Time:    10:52    ASSESSMENT  61 y.o. White female with:  1. ELEVATED GGT & HISTORY OF LONG TERM USE OF MTX  -- Transaminases, ALP and synthetic function normal.  -- No biliary findings on recent imaging.  -- No history of heavy alcohol use.  -- No evidence of cholestasis.  -- AMA negative, when last checked in 2018.  -- Fibroscan in 3/2021 was suggestive of no progression in fibrosis or steatosis.    PLAN:    1. Repeat Fibroscan to restage liver disease in March.  2. Repeat LFT's in March, as scheduled through Rheumatology  3. Continue to limit alcohol use.  4. Avoid herbal supplements/alternative remedies.   5. Recommend weight loss of 10-15 lbs, through diet and exercise.  6. Return to clinic to be determined by Fibroscan results.       Hepatology Nurse Practitioner  [facility]  1/19/2022 @ 0430

## 2022-01-18 NOTE — PATIENT INSTRUCTIONS
- Repeat Fibroscan in March to assess for fat and scar tissue in the liver.  - Repeat liver function tests in March as planned.  - Limit alcohol intake and avoid herbal supplements.  - Recommend weight loss of 10-15 lbs, through diet and exercise.  - Return to clinic to be determined by Fibroscan results.

## 2022-01-19 PROBLEM — R79.9 ABNORMAL FINDING OF BLOOD CHEMISTRY, UNSPECIFIED: Status: ACTIVE | Noted: 2022-01-19

## 2022-01-19 PROBLEM — Z92.21 HISTORY OF METHOTREXATE THERAPY: Status: ACTIVE | Noted: 2022-01-19

## 2022-01-20 NOTE — PROCEDURES
Procedure cancelled, and rescheduled.        Hepatology Nurse Practitioner  [facility]

## 2022-01-25 ENCOUNTER — PATIENT OUTREACH (OUTPATIENT)
Dept: ADMINISTRATIVE | Facility: OTHER | Age: 62
End: 2022-01-25
Payer: MEDICARE

## 2022-01-25 RX ORDER — METHENAMINE HIPPURATE 1000 MG/1
1 TABLET ORAL 2 TIMES DAILY
Qty: 180 TABLET | Refills: 3 | Status: SHIPPED | OUTPATIENT
Start: 2022-01-25 | End: 2022-05-03 | Stop reason: CLARIF

## 2022-01-26 ENCOUNTER — OFFICE VISIT (OUTPATIENT)
Dept: DERMATOLOGY | Facility: CLINIC | Age: 62
End: 2022-01-26
Payer: MEDICARE

## 2022-01-26 DIAGNOSIS — L30.8 PSORIASIFORM DERMATITIS: Primary | ICD-10-CM

## 2022-01-26 PROCEDURE — 1160F PR REVIEW ALL MEDS BY PRESCRIBER/CLIN PHARMACIST DOCUMENTED: ICD-10-PCS | Mod: CPTII,S$GLB,, | Performed by: DERMATOLOGY

## 2022-01-26 PROCEDURE — 11104 PUNCH BX SKIN SINGLE LESION: CPT | Mod: S$GLB,,, | Performed by: DERMATOLOGY

## 2022-01-26 PROCEDURE — 1159F MED LIST DOCD IN RCRD: CPT | Mod: CPTII,S$GLB,, | Performed by: DERMATOLOGY

## 2022-01-26 PROCEDURE — 99999 PR PBB SHADOW E&M-EST. PATIENT-LVL IV: ICD-10-PCS | Mod: PBBFAC,,, | Performed by: DERMATOLOGY

## 2022-01-26 PROCEDURE — 88305 TISSUE EXAM BY PATHOLOGIST: ICD-10-PCS | Mod: 26,,, | Performed by: DERMATOLOGY

## 2022-01-26 PROCEDURE — 88312 SPECIAL STAINS GROUP 1: CPT | Mod: 26,,, | Performed by: DERMATOLOGY

## 2022-01-26 PROCEDURE — 99213 OFFICE O/P EST LOW 20 MIN: CPT | Mod: 25,S$GLB,, | Performed by: DERMATOLOGY

## 2022-01-26 PROCEDURE — 88312 PR  SPECIAL STAINS,GROUP I: ICD-10-PCS | Mod: 26,,, | Performed by: DERMATOLOGY

## 2022-01-26 PROCEDURE — 1160F RVW MEDS BY RX/DR IN RCRD: CPT | Mod: CPTII,S$GLB,, | Performed by: DERMATOLOGY

## 2022-01-26 PROCEDURE — 11104 PR PUNCH BIOPSY, SKIN, SINGLE LESION: ICD-10-PCS | Mod: S$GLB,,, | Performed by: DERMATOLOGY

## 2022-01-26 PROCEDURE — 88305 TISSUE EXAM BY PATHOLOGIST: CPT | Mod: 26,,, | Performed by: DERMATOLOGY

## 2022-01-26 PROCEDURE — 99213 PR OFFICE/OUTPT VISIT, EST, LEVL III, 20-29 MIN: ICD-10-PCS | Mod: 25,S$GLB,, | Performed by: DERMATOLOGY

## 2022-01-26 PROCEDURE — 1159F PR MEDICATION LIST DOCUMENTED IN MEDICAL RECORD: ICD-10-PCS | Mod: CPTII,S$GLB,, | Performed by: DERMATOLOGY

## 2022-01-26 PROCEDURE — 88312 SPECIAL STAINS GROUP 1: CPT | Performed by: DERMATOLOGY

## 2022-01-26 PROCEDURE — 99999 PR PBB SHADOW E&M-EST. PATIENT-LVL IV: CPT | Mod: PBBFAC,,, | Performed by: DERMATOLOGY

## 2022-01-26 PROCEDURE — 88305 TISSUE EXAM BY PATHOLOGIST: CPT | Performed by: DERMATOLOGY

## 2022-01-26 RX ORDER — HALOBETASOL PROPIONATE AND TAZAROTENE .1; .45 MG/G; MG/G
LOTION TOPICAL
Qty: 100 G | Refills: 3 | Status: SHIPPED | OUTPATIENT
Start: 2022-01-26 | End: 2023-11-07

## 2022-01-26 NOTE — PROGRESS NOTES
Subjective:       Patient ID:  Joyce Peterson is a 61 y.o. female who presents for   Chief Complaint   Patient presents with    Dry Skin     Hands and feet      Pt has had cracks and fissures on hands and feet x over 20 years. Progressing.  Painful. Limits motion of hands and walking. Was on mtx in the past and this did not help.  Pt uses new skin and crazy glue on the cracks and fissures and this helps only a little. Pt was on MTX years ago and does not feel like it helped at all.      Dry Skin - Initial  Affected locations: right hand, left hand, right foot and left foot  Duration: 20 years  Signs / symptoms: dryness, cracking, burning and bleeding  Severity: mild  Timing: constant  Aggravated by: nothing  Relieving factors/Treatments tried: OTC moisturizer  Improvement on treatment: no relief        Review of Systems   Constitutional: Negative for fever, chills and fatigue.   Skin: Positive for itching, rash and dry skin. Negative for daily sunscreen use, recent sunburn and wears hat.   Hematologic/Lymphatic: Does not bruise/bleed easily.        Objective:    Physical Exam   Constitutional: She appears well-developed and well-nourished. No distress.   Neurological: She is alert and oriented to person, place, and time. She is not disoriented.   Psychiatric: She has a normal mood and affect.   Skin:   Areas Examined (abnormalities noted in diagram):   RUE Inspected  LUE Inspection Performed  RLE Inspected  LLE Inspection Performed  Nails and Digits Inspection Performed                 Diagram Legend     Erythematous scaling macule/papule c/w actinic keratosis       Vascular papule c/w angioma      Pigmented verrucoid papule/plaque c/w seborrheic keratosis      Yellow umbilicated papule c/w sebaceous hyperplasia      Irregularly shaped tan macule c/w lentigo     1-2 mm smooth white papules consistent with Milia      Movable subcutaneous cyst with punctum c/w epidermal inclusion cyst      Subcutaneous movable  cyst c/w pilar cyst      Firm pink to brown papule c/w dermatofibroma      Pedunculated fleshy papule(s) c/w skin tag(s)      Evenly pigmented macule c/w junctional nevus     Mildly variegated pigmented, slightly irregular-bordered macule c/w mildly atypical nevus      Flesh colored to evenly pigmented papule c/w intradermal nevus       Pink pearly papule/plaque c/w basal cell carcinoma      Erythematous hyperkeratotic cursted plaque c/w SCC      Surgical scar with no sign of skin cancer recurrence      Open and closed comedones      Inflammatory papules and pustules      Verrucoid papule consistent consistent with wart     Erythematous eczematous patches and plaques     Dystrophic onycholytic nail with subungual debris c/w onychomycosis     Umbilicated papule    Erythematous-base heme-crusted tan verrucoid plaque consistent with inflamed seborrheic keratosis     Erythematous Silvery Scaling Plaque c/w Psoriasis     See annotation      Assessment / Plan:      Pathology Orders:     Normal Orders This Visit    Specimen to Pathology, Dermatology     Comments:    Number of Specimens:->1  ------------------------->-------------------------  Spec 1 Procedure:->Biopsy  Spec 1 Clinical Impression:->r/p psoriasis v keratoderma v  PRP v other  Spec 1 Source:->left lateral heel    Questions:    Procedure Type: Dermatology and skin neoplasms    Number of Specimens: 1    ------------------------: -------------------------    Spec 1 Procedure: Biopsy    Spec 1 Clinical Impression: r/p psoriasis v keratoderma v PRP v other    Spec 1 Source: left lateral heel    Release to patient:         Psoriasiform dermatitis  -     halobetasol propion-tazarotene (DUOBRII) 0.01-0.045 % Lotn; aaa on feet and hands qhs  then vaseline and warm towel  Dispense: 100 g; Refill: 3  -     Specimen to Pathology, Dermatology    Alternate duobrii with carmol 20 otc nightly- then vaseline and warm damp towel/sock  Crazy glue to fissures/liquid bandaid      Pt was on MTX in the past and this did not help .    Consider otezla? Pt already on arava         Follow up in about 2 weeks (around 2/9/2022) for EP.

## 2022-01-26 NOTE — PATIENT INSTRUCTIONS
Post- Operative Wound Care    Your doctor has performed local skin surgery today.  Vaseline ointment and a pressure bandage were placed after the surgery.  It is very important that you keep this bandage in place for 24 hours.  This will decrease the risk of post - operative infection and bleeding.  After 24 hours, you may remove the band aid and wash the area with warm soap and water and apply Vaseline ointment.  Many patients prefer to use Neosporin or Bacitracin ointment.  This is acceptable; however know that you can develop an allergy to this medication even if you have used it safely for years.  It is important to keep the area moist.  Letting it dry out and get air slows healing time, will worsen the scar, and make it more difficult to remove the stitches.  Band aid is optional after first 24 hours.    It is best NOT to use hydrogen peroxide or antibacterial soap to wash the operative site.       If you notice increasing redness, tenderness, pain, or yellow drainage at the biopsy or surgical site, please notify your doctor.  These are signs of an infection.    If your biopsy/surgical site is bleeding, apply firm pressure for 15 minutes straight.  Repeat for another 15 minutes, if it is still bleeding.   If the surgical site continues to bleed, then please contact your doctor.      For [facility] users:   You will receive your pathology results in [facility] as soon as they are available. Please be assured that your physician/provider will review your results and contact you should additional treatment be required. This is one more way [facility] is putting you first.       [facility] [facility] [place] [address]/ [phone] FAX/ [email or web address]

## 2022-02-01 ENCOUNTER — OFFICE VISIT (OUTPATIENT)
Dept: FAMILY MEDICINE | Facility: CLINIC | Age: 62
End: 2022-02-01
Payer: MEDICARE

## 2022-02-01 VITALS
OXYGEN SATURATION: 97 % | WEIGHT: 154.75 LBS | SYSTOLIC BLOOD PRESSURE: 124 MMHG | DIASTOLIC BLOOD PRESSURE: 68 MMHG | TEMPERATURE: 98 F | HEIGHT: 61 IN | HEART RATE: 101 BPM | BODY MASS INDEX: 29.22 KG/M2

## 2022-02-01 DIAGNOSIS — Z00.00 ENCOUNTER FOR MEDICAL EXAMINATION TO ESTABLISH CARE: Primary | ICD-10-CM

## 2022-02-01 DIAGNOSIS — M81.8 STEROID-INDUCED OSTEOPOROSIS: Chronic | ICD-10-CM

## 2022-02-01 DIAGNOSIS — T38.0X5A STEROID-INDUCED OSTEOPOROSIS: Chronic | ICD-10-CM

## 2022-02-01 DIAGNOSIS — Z00.00 ANNUAL PHYSICAL EXAM: ICD-10-CM

## 2022-02-01 DIAGNOSIS — M05.79 RHEUMATOID ARTHRITIS INVOLVING MULTIPLE SITES WITH POSITIVE RHEUMATOID FACTOR: Chronic | ICD-10-CM

## 2022-02-01 DIAGNOSIS — E05.90 SUBCLINICAL HYPERTHYROIDISM: ICD-10-CM

## 2022-02-01 DIAGNOSIS — E78.00 PURE HYPERCHOLESTEROLEMIA: ICD-10-CM

## 2022-02-01 DIAGNOSIS — B37.81 CANDIDAL ESOPHAGITIS: ICD-10-CM

## 2022-02-01 DIAGNOSIS — R74.8 ELEVATED SERUM GGT LEVEL: ICD-10-CM

## 2022-02-01 PROBLEM — M25.552 LEFT HIP PAIN: Status: RESOLVED | Noted: 2019-03-06 | Resolved: 2022-02-01

## 2022-02-01 PROBLEM — D80.1 HYPOGAMMAGLOBULINEMIA: Status: RESOLVED | Noted: 2017-05-10 | Resolved: 2022-02-01

## 2022-02-01 PROBLEM — G56.01 RIGHT CARPAL TUNNEL SYNDROME: Status: RESOLVED | Noted: 2017-05-29 | Resolved: 2022-02-01

## 2022-02-01 PROBLEM — R79.9 ABNORMAL FINDING OF BLOOD CHEMISTRY, UNSPECIFIED: Status: RESOLVED | Noted: 2022-01-19 | Resolved: 2022-02-01

## 2022-02-01 PROBLEM — Z74.09 IMPAIRED MOBILITY AND ACTIVITIES OF DAILY LIVING: Status: RESOLVED | Noted: 2021-10-05 | Resolved: 2022-02-01

## 2022-02-01 PROBLEM — M47.816 OSTEOARTHRITIS OF LUMBAR SPINE: Status: RESOLVED | Noted: 2021-08-23 | Resolved: 2022-02-01

## 2022-02-01 PROBLEM — R29.898 DECREASED STRENGTH OF LOWER EXTREMITY: Status: RESOLVED | Noted: 2021-10-08 | Resolved: 2022-02-01

## 2022-02-01 PROBLEM — R29.898 DECREASED GRIP STRENGTH OF RIGHT HAND: Status: RESOLVED | Noted: 2017-06-30 | Resolved: 2022-02-01

## 2022-02-01 PROBLEM — M79.641 PAIN IN RIGHT HAND: Status: RESOLVED | Noted: 2017-06-30 | Resolved: 2022-02-01

## 2022-02-01 PROBLEM — Z00.6 RESEARCH STUDY PATIENT: Status: RESOLVED | Noted: 2018-04-30 | Resolved: 2022-02-01

## 2022-02-01 PROBLEM — R79.89 ELEVATED PARATHYROID HORMONE: Status: RESOLVED | Noted: 2017-05-10 | Resolved: 2022-02-01

## 2022-02-01 PROBLEM — M84.352A: Status: RESOLVED | Noted: 2019-05-20 | Resolved: 2022-02-01

## 2022-02-01 PROBLEM — G56.01 CARPAL TUNNEL SYNDROME OF RIGHT WRIST: Status: RESOLVED | Noted: 2017-04-04 | Resolved: 2022-02-01

## 2022-02-01 PROBLEM — Z92.21 HISTORY OF METHOTREXATE THERAPY: Status: RESOLVED | Noted: 2022-01-19 | Resolved: 2022-02-01

## 2022-02-01 PROBLEM — R53.1 DECREASED RANGE OF MOTION WITH DECREASED STRENGTH: Status: RESOLVED | Noted: 2021-10-05 | Resolved: 2022-02-01

## 2022-02-01 PROBLEM — R26.9 GAIT ABNORMALITY: Status: RESOLVED | Noted: 2019-03-20 | Resolved: 2022-02-01

## 2022-02-01 PROBLEM — M25.511 RIGHT SHOULDER PAIN: Status: RESOLVED | Noted: 2017-08-21 | Resolved: 2022-02-01

## 2022-02-01 PROBLEM — M25.512 ACUTE PAIN OF LEFT SHOULDER: Status: RESOLVED | Noted: 2021-04-26 | Resolved: 2022-02-01

## 2022-02-01 PROBLEM — M25.60 DECREASED RANGE OF MOTION WITH DECREASED STRENGTH: Status: RESOLVED | Noted: 2021-10-05 | Resolved: 2022-02-01

## 2022-02-01 PROBLEM — G89.29 CHRONIC PAIN: Status: RESOLVED | Noted: 2021-03-29 | Resolved: 2022-02-01

## 2022-02-01 PROBLEM — M25.521 PAIN IN RIGHT ELBOW: Status: RESOLVED | Noted: 2017-06-30 | Resolved: 2022-02-01

## 2022-02-01 PROBLEM — Z79.631 LONG TERM METHOTREXATE USER: Status: RESOLVED | Noted: 2021-03-17 | Resolved: 2022-02-01

## 2022-02-01 PROBLEM — Z78.9 IMPAIRED MOBILITY AND ACTIVITIES OF DAILY LIVING: Status: RESOLVED | Noted: 2021-10-05 | Resolved: 2022-02-01

## 2022-02-01 PROBLEM — M70.62 GREATER TROCHANTERIC BURSITIS OF LEFT HIP: Status: RESOLVED | Noted: 2019-05-13 | Resolved: 2022-02-01

## 2022-02-01 PROBLEM — S46.312A RUPTURE OF LEFT TRICEPS TENDON: Status: RESOLVED | Noted: 2018-10-17 | Resolved: 2022-02-01

## 2022-02-01 PROBLEM — D50.9 IDA (IRON DEFICIENCY ANEMIA): Status: RESOLVED | Noted: 2020-01-24 | Resolved: 2022-02-01

## 2022-02-01 PROCEDURE — 99396 PREV VISIT EST AGE 40-64: CPT | Mod: S$GLB,,, | Performed by: STUDENT IN AN ORGANIZED HEALTH CARE EDUCATION/TRAINING PROGRAM

## 2022-02-01 PROCEDURE — 3078F PR MOST RECENT DIASTOLIC BLOOD PRESSURE < 80 MM HG: ICD-10-PCS | Mod: CPTII,S$GLB,, | Performed by: STUDENT IN AN ORGANIZED HEALTH CARE EDUCATION/TRAINING PROGRAM

## 2022-02-01 PROCEDURE — 1160F PR REVIEW ALL MEDS BY PRESCRIBER/CLIN PHARMACIST DOCUMENTED: ICD-10-PCS | Mod: CPTII,S$GLB,, | Performed by: STUDENT IN AN ORGANIZED HEALTH CARE EDUCATION/TRAINING PROGRAM

## 2022-02-01 PROCEDURE — 1160F RVW MEDS BY RX/DR IN RCRD: CPT | Mod: CPTII,S$GLB,, | Performed by: STUDENT IN AN ORGANIZED HEALTH CARE EDUCATION/TRAINING PROGRAM

## 2022-02-01 PROCEDURE — 1159F PR MEDICATION LIST DOCUMENTED IN MEDICAL RECORD: ICD-10-PCS | Mod: CPTII,S$GLB,, | Performed by: STUDENT IN AN ORGANIZED HEALTH CARE EDUCATION/TRAINING PROGRAM

## 2022-02-01 PROCEDURE — 3008F PR BODY MASS INDEX (BMI) DOCUMENTED: ICD-10-PCS | Mod: CPTII,S$GLB,, | Performed by: STUDENT IN AN ORGANIZED HEALTH CARE EDUCATION/TRAINING PROGRAM

## 2022-02-01 PROCEDURE — 1159F MED LIST DOCD IN RCRD: CPT | Mod: CPTII,S$GLB,, | Performed by: STUDENT IN AN ORGANIZED HEALTH CARE EDUCATION/TRAINING PROGRAM

## 2022-02-01 PROCEDURE — 3078F DIAST BP <80 MM HG: CPT | Mod: CPTII,S$GLB,, | Performed by: STUDENT IN AN ORGANIZED HEALTH CARE EDUCATION/TRAINING PROGRAM

## 2022-02-01 PROCEDURE — 99396 PR PREVENTIVE VISIT,EST,40-64: ICD-10-PCS | Mod: S$GLB,,, | Performed by: STUDENT IN AN ORGANIZED HEALTH CARE EDUCATION/TRAINING PROGRAM

## 2022-02-01 PROCEDURE — 99999 PR PBB SHADOW E&M-EST. PATIENT-LVL V: ICD-10-PCS | Mod: PBBFAC,,, | Performed by: STUDENT IN AN ORGANIZED HEALTH CARE EDUCATION/TRAINING PROGRAM

## 2022-02-01 PROCEDURE — 3074F SYST BP LT 130 MM HG: CPT | Mod: CPTII,S$GLB,, | Performed by: STUDENT IN AN ORGANIZED HEALTH CARE EDUCATION/TRAINING PROGRAM

## 2022-02-01 PROCEDURE — 3074F PR MOST RECENT SYSTOLIC BLOOD PRESSURE < 130 MM HG: ICD-10-PCS | Mod: CPTII,S$GLB,, | Performed by: STUDENT IN AN ORGANIZED HEALTH CARE EDUCATION/TRAINING PROGRAM

## 2022-02-01 PROCEDURE — 99999 PR PBB SHADOW E&M-EST. PATIENT-LVL V: CPT | Mod: PBBFAC,,, | Performed by: STUDENT IN AN ORGANIZED HEALTH CARE EDUCATION/TRAINING PROGRAM

## 2022-02-01 PROCEDURE — 3008F BODY MASS INDEX DOCD: CPT | Mod: CPTII,S$GLB,, | Performed by: STUDENT IN AN ORGANIZED HEALTH CARE EDUCATION/TRAINING PROGRAM

## 2022-02-01 NOTE — PROGRESS NOTES
Subjective:       Patient ID: Joyce Peterson is a 61 y.o. female.    Chief Complaint: Establish Care (Pt here to est care  )    Rheumatoid arthritis involving multiple sites  Sees rheum regularly   Previously on methotrexate  Erosive RA  On sarilumab + oral prednisone 5-6mg      Steroid-induced osteoporosis  severe  Previously on Prolea but recently started on drug holiday   Sees endo regularly     Subclinical hyperthyroidism  Nodules  Px US  See endocrine regularly     Elevated serum GGT level  Sees hepatology yearly  Lab abnormalities likely related to medications including hx methotrexate use; not concerning to them     Hyperlipidemia  Stable on crestor 20    Candidal esophagitis  Sees GI routinely  likely 2/2 chronic steroid use  On PPI      Sees GYN OSH (artie ROWLEY) UTD pap and mammo  No complaints today   Foot surgery (left total reconstruction) planned likely summertime as this is causing more problems elsewhere 2/2 compensation     Health Maintenance Due   Topic Date Due    Cervical Cancer Screening  09/27/2021        Review of Systems   Constitutional: Negative for fever.   HENT: Negative.    Respiratory: Negative for cough, shortness of breath and wheezing.    Cardiovascular: Negative for chest pain and leg swelling.   Gastrointestinal: Negative for abdominal pain, diarrhea, nausea and vomiting.   Genitourinary: Negative.  Negative for difficulty urinating, dysuria and frequency.   Musculoskeletal: Positive for arthralgias, back pain, gait problem, joint swelling and myalgias.   Neurological: Positive for weakness. Negative for dizziness, numbness and headaches.   Psychiatric/Behavioral: Positive for sleep disturbance. Negative for dysphoric mood. The patient is not nervous/anxious.       Objective:      Vitals:    02/01/22 1310   BP: 124/68   Pulse: 101   Temp: 98.1 °F (36.7 °C)      Physical Exam  Constitutional:       Appearance: Normal appearance. She is overweight.   HENT:      Head:  Normocephalic and atraumatic.   Eyes:      Conjunctiva/sclera: Conjunctivae normal.   Cardiovascular:      Rate and Rhythm: Normal rate and regular rhythm.      Heart sounds: Normal heart sounds.   Pulmonary:      Effort: Pulmonary effort is normal.      Breath sounds: Normal breath sounds.   Abdominal:      Palpations: Abdomen is soft.      Tenderness: There is no abdominal tenderness.   Musculoskeletal:         General: Swelling, tenderness and deformity present. Normal range of motion.      Cervical back: Normal range of motion.      Right lower leg: No edema.      Left lower leg: No edema.   Neurological:      General: No focal deficit present.      Mental Status: She is alert and oriented to person, place, and time.   Psychiatric:         Mood and Affect: Mood normal.         Behavior: Behavior normal.          Assessment:       1. Encounter for medical examination to establish care    2. Annual physical exam    3. Rheumatoid arthritis involving multiple sites with positive rheumatoid factor    4. Steroid-induced osteoporosis    5. Subclinical hyperthyroidism    6. Elevated serum GGT level    7. Pure hypercholesterolemia    8. Candidal esophagitis        Plan:     1. Encounter for medical examination to establish care    2. Annual physical exam    3. Rheumatoid arthritis involving multiple sites with positive rheumatoid factor    4. Steroid-induced osteoporosis    5. Subclinical hyperthyroidism    6. Elevated serum GGT level    7. Pure hypercholesterolemia    8. Candidal esophagitis       Establish care; well woman; labs reviewed in detail and UTD  Cont current medications per orders  Keep routine specialist f/u   RTC in 6 months and/or prn         Krystal Florencessamy [facility]   2/1/22

## 2022-02-01 NOTE — ASSESSMENT & PLAN NOTE
Sees rheum regularly   Previously on methotrexate  Erosive RA  On sarilumab + oral prednisone 5-6mg

## 2022-02-01 NOTE — ASSESSMENT & PLAN NOTE
Sees hepatology yearly  Lab abnormalities likely related to medications including hx methotrexate use; not concerning to them

## 2022-02-02 ENCOUNTER — TELEPHONE (OUTPATIENT)
Dept: ADMINISTRATIVE | Facility: HOSPITAL | Age: 62
End: 2022-02-02
Payer: MEDICARE

## 2022-02-02 ENCOUNTER — PATIENT MESSAGE (OUTPATIENT)
Dept: UROLOGY | Facility: CLINIC | Age: 62
End: 2022-02-02
Payer: MEDICARE

## 2022-02-03 ENCOUNTER — TELEPHONE (OUTPATIENT)
Dept: OPHTHALMOLOGY | Facility: CLINIC | Age: 62
End: 2022-02-03
Payer: MEDICARE

## 2022-02-03 NOTE — TELEPHONE ENCOUNTER
Called patient and scheduled there appointment     ----- Message from Tori Whitehead sent at 2/3/2022  9:32 AM CST -----  Regarding: appts  Patient is looking to make her annual appointments for herself and her , Kurt MRN: [id number].  Both will need a contact lens fitting appointment also.  The would like them both on the same day in the afternoon.  I was unable to make the appointments in [facility].        Joyce  @ [phone]

## 2022-02-07 ENCOUNTER — SPECIALTY PHARMACY (OUTPATIENT)
Dept: PHARMACY | Facility: CLINIC | Age: 62
End: 2022-02-07
Payer: MEDICARE

## 2022-02-09 ENCOUNTER — OFFICE VISIT (OUTPATIENT)
Dept: DERMATOLOGY | Facility: CLINIC | Age: 62
End: 2022-02-09
Payer: MEDICARE

## 2022-02-09 ENCOUNTER — PATIENT MESSAGE (OUTPATIENT)
Dept: RHEUMATOLOGY | Facility: CLINIC | Age: 62
End: 2022-02-09
Payer: MEDICARE

## 2022-02-09 ENCOUNTER — OFFICE VISIT (OUTPATIENT)
Dept: PAIN MEDICINE | Facility: CLINIC | Age: 62
End: 2022-02-09
Attending: ANESTHESIOLOGY
Payer: MEDICARE

## 2022-02-09 VITALS
BODY MASS INDEX: 29.07 KG/M2 | HEIGHT: 61 IN | TEMPERATURE: 98 F | WEIGHT: 154 LBS | HEART RATE: 104 BPM | SYSTOLIC BLOOD PRESSURE: 145 MMHG | DIASTOLIC BLOOD PRESSURE: 83 MMHG | RESPIRATION RATE: 18 BRPM

## 2022-02-09 DIAGNOSIS — M54.16 LUMBAR RADICULOPATHY: ICD-10-CM

## 2022-02-09 DIAGNOSIS — B35.3 TINEA PEDIS OF BOTH FEET: Primary | ICD-10-CM

## 2022-02-09 DIAGNOSIS — M70.71 ISCHIAL BURSITIS OF RIGHT SIDE: Primary | ICD-10-CM

## 2022-02-09 DIAGNOSIS — Q82.8 KERATODERMA: ICD-10-CM

## 2022-02-09 LAB
FINAL PATHOLOGIC DIAGNOSIS: NORMAL
GROSS: NORMAL
Lab: NORMAL
MICROSCOPIC EXAM: NORMAL

## 2022-02-09 PROCEDURE — 3077F SYST BP >= 140 MM HG: CPT | Mod: CPTII,S$GLB,, | Performed by: ANESTHESIOLOGY

## 2022-02-09 PROCEDURE — 1160F RVW MEDS BY RX/DR IN RCRD: CPT | Mod: CPTII,S$GLB,, | Performed by: DERMATOLOGY

## 2022-02-09 PROCEDURE — 1159F PR MEDICATION LIST DOCUMENTED IN MEDICAL RECORD: ICD-10-PCS | Mod: CPTII,S$GLB,, | Performed by: DERMATOLOGY

## 2022-02-09 PROCEDURE — 1159F MED LIST DOCD IN RCRD: CPT | Mod: CPTII,S$GLB,, | Performed by: ANESTHESIOLOGY

## 2022-02-09 PROCEDURE — 1160F PR REVIEW ALL MEDS BY PRESCRIBER/CLIN PHARMACIST DOCUMENTED: ICD-10-PCS | Mod: CPTII,S$GLB,, | Performed by: DERMATOLOGY

## 2022-02-09 PROCEDURE — 99213 OFFICE O/P EST LOW 20 MIN: CPT | Mod: S$GLB,,, | Performed by: ANESTHESIOLOGY

## 2022-02-09 PROCEDURE — 99213 PR OFFICE/OUTPT VISIT, EST, LEVL III, 20-29 MIN: ICD-10-PCS | Mod: S$GLB,,, | Performed by: ANESTHESIOLOGY

## 2022-02-09 PROCEDURE — 99999 PR PBB SHADOW E&M-EST. PATIENT-LVL IV: CPT | Mod: PBBFAC,,, | Performed by: DERMATOLOGY

## 2022-02-09 PROCEDURE — 3008F BODY MASS INDEX DOCD: CPT | Mod: CPTII,S$GLB,, | Performed by: ANESTHESIOLOGY

## 2022-02-09 PROCEDURE — 1160F PR REVIEW ALL MEDS BY PRESCRIBER/CLIN PHARMACIST DOCUMENTED: ICD-10-PCS | Mod: CPTII,S$GLB,, | Performed by: ANESTHESIOLOGY

## 2022-02-09 PROCEDURE — 3079F PR MOST RECENT DIASTOLIC BLOOD PRESSURE 80-89 MM HG: ICD-10-PCS | Mod: CPTII,S$GLB,, | Performed by: ANESTHESIOLOGY

## 2022-02-09 PROCEDURE — 3077F PR MOST RECENT SYSTOLIC BLOOD PRESSURE >= 140 MM HG: ICD-10-PCS | Mod: CPTII,S$GLB,, | Performed by: ANESTHESIOLOGY

## 2022-02-09 PROCEDURE — 1160F RVW MEDS BY RX/DR IN RCRD: CPT | Mod: CPTII,S$GLB,, | Performed by: ANESTHESIOLOGY

## 2022-02-09 PROCEDURE — 1159F PR MEDICATION LIST DOCUMENTED IN MEDICAL RECORD: ICD-10-PCS | Mod: CPTII,S$GLB,, | Performed by: ANESTHESIOLOGY

## 2022-02-09 PROCEDURE — 99213 PR OFFICE/OUTPT VISIT, EST, LEVL III, 20-29 MIN: ICD-10-PCS | Mod: S$GLB,,, | Performed by: DERMATOLOGY

## 2022-02-09 PROCEDURE — 99999 PR PBB SHADOW E&M-EST. PATIENT-LVL V: ICD-10-PCS | Mod: PBBFAC,,, | Performed by: ANESTHESIOLOGY

## 2022-02-09 PROCEDURE — 99999 PR PBB SHADOW E&M-EST. PATIENT-LVL V: CPT | Mod: PBBFAC,,, | Performed by: ANESTHESIOLOGY

## 2022-02-09 PROCEDURE — 3079F DIAST BP 80-89 MM HG: CPT | Mod: CPTII,S$GLB,, | Performed by: ANESTHESIOLOGY

## 2022-02-09 PROCEDURE — 1159F MED LIST DOCD IN RCRD: CPT | Mod: CPTII,S$GLB,, | Performed by: DERMATOLOGY

## 2022-02-09 PROCEDURE — 99213 OFFICE O/P EST LOW 20 MIN: CPT | Mod: S$GLB,,, | Performed by: DERMATOLOGY

## 2022-02-09 PROCEDURE — 99999 PR PBB SHADOW E&M-EST. PATIENT-LVL IV: ICD-10-PCS | Mod: PBBFAC,,, | Performed by: DERMATOLOGY

## 2022-02-09 PROCEDURE — 3008F PR BODY MASS INDEX (BMI) DOCUMENTED: ICD-10-PCS | Mod: CPTII,S$GLB,, | Performed by: ANESTHESIOLOGY

## 2022-02-09 RX ORDER — KETOCONAZOLE 20 MG/G
CREAM TOPICAL
Qty: 60 G | Refills: 3 | Status: SHIPPED | OUTPATIENT
Start: 2022-02-09 | End: 2023-11-07

## 2022-02-09 NOTE — PATIENT INSTRUCTIONS
Feet  Ketoconazole cream 2x per day  X 3 weeks then can resume Duobrii  Urea 1-2x per day       Hands - continue urea cream

## 2022-02-09 NOTE — PROGRESS NOTES
Subjective:      Patient ID: Joyce Peterson is a 61 y.o. female.    Chief Complaint: Low-back Pain (bilateral)    Referred by: No ref. provider found     HPI  She is here for follow-up.  She feels that her pain might be coming back.  It is mainly in the right buttock.  She has problems with her left foot and she is requiring reconstructive surgery.  She is putting off the surgery until the repairs and updates are done at her house to be handicap accessible.  She feels that the pain in the right buttock is related to her antalgic gait.  She has problems sitting on a chair and on driving that she has to tilt towards the left side.  There is no radicular symptomatology of tingling, numbness or weakness.  She has a pinpoint pain and tenderness that she points to around the ischial bone on the right side.  Imaging reviewed.      Past Medical History:   Diagnosis Date    Acid reflux     Allergy     Anemia     Asthma     Coronary artery disease     CS (cervical spondylosis) 3/8/2013    Degenerative disc disease     Dry eyes     Dry mouth     Gastroparesis     History of methotrexate therapy 1/19/2022    Hyperlipidemia     Lateral meniscus derangement 4/6/2016    Lobular carcinoma in situ     Lumbar spondylosis 3/8/2013    Osteoarthritis     Osteoporosis     Rheumatoid arthritis(714.0)     Rupture of left triceps tendon 10/17/2018    Umbilical hernia 8/13/2015       Past Surgical History:   Procedure Laterality Date    BREAST BIOPSY Left 01/29/2002    core bx    CARPAL TUNNEL RELEASE Right 05/2017    CHOLECYSTECTOMY  2004    COLONOSCOPY      10/11    COLONOSCOPY N/A 6/29/2017    Procedure: COLONOSCOPY;  Surgeon: López Moore MD;  Location: [facility] ENDO ([address] [place] [address]);  Service: Endoscopy;  Laterality: N/A;    EPIDURAL STEROID INJECTION N/A 11/18/2021    Procedure: INJECTION, STEROID, EPIDURAL, L5-S1 IL NEED CONSENT;  Surgeon: Tj Mayfield MD;  Location: [facility];  Service: Pain Management;   Laterality: N/A;    INJECTION OF ANESTHETIC AGENT AROUND NERVE Bilateral 8/23/2021    Procedure: BLOCK, NERVE, MEDIAL BRANCH L3,L4,L5;  Surgeon: Tj Mayfield MD;  Location: [facility];  Service: Pain Management;  Laterality: Bilateral;  1 of 2    INJECTION OF ANESTHETIC AGENT AROUND NERVE Bilateral 8/26/2021    Procedure: BLOCK, NERVE, MEDIAL BRANCH L3,L4,L5;  Surgeon: Tj Mayfield MD;  Location: [facility];  Service: Pain Management;  Laterality: Bilateral;  2 of 2    INJECTION OF FACET JOINT Bilateral 3/12/2020    Procedure: FACET JOINT INJECTION (LUMBAR BLOCK) BILATERAL L4-5 AND L5-S1 DIRECT REFERRAL;  Surgeon: Tj Mayfield MD;  Location: [facility]GT;  Service: Pain Management;  Laterality: Bilateral;  NEEDS CONSENT    INJECTION OF FACET JOINT Bilateral 3/29/2021    Procedure: INJECTION, FACET JOINT L3/4, L4/5, L5/S1;  Surgeon: Tj Mayfield MD;  Location: [facility];  Service: Pain Management;  Laterality: Bilateral;    INJECTION OF JOINT Right 7/30/2020    Procedure: INJECTION, JOINT, RIGHT HIP Interarticular under flouro;  Surgeon: Tj Mayfield MD;  Location: [facility];  Service: Pain Management;  Laterality: Right;  INJECTION, JOINT, RIGHT HIP Interarticular under flouro    INTRAMEDULLARY RODDING OF FEMUR Left 5/21/2019    Procedure: INSERTION, INTRAMEDULLARY ARIEL, FEMUR;  Surgeon: López Duff MD;  Location: [facility] OR [address] [place] [address];  Service: Orthopedics;  Laterality: Left;    RADIOFREQUENCY ABLATION Left 9/20/2021    Procedure: RADIOFREQUENCY ABLATION left L3,4,5 RFA  1 of 2  CONSENT NEEDED;  Surgeon: Tj Mayfield MD;  Location: [facility]GT;  Service: Pain Management;  Laterality: Left;    RADIOFREQUENCY ABLATION Right 10/4/2021    Procedure: RADIOFREQUENCY ABLATION  right L3,4,5 RFA   2 of 2  CONSENT NEEDED;  Surgeon: Tj Mayfield MD;  Location: [facility]GT;  Service: Pain Management;  Laterality: Right;    REPAIR OF TRICEPS TENDON Left 10/17/2018    Procedure: REPAIR, TENDON,  "TRICEPS left elbow;  Surgeon: Staci Yarbrough MD;  [facility];  Service: Orthopedics;  Laterality: Left;  Anesthesia: General and regional. PRONE, k-wire , hand pan 1 and pan 2, CALL ARTHREX/Tamera notified 10-12 LO    TONSILLECTOMY      TRANSFORAMINAL EPIDURAL INJECTION OF STEROID Right 8/31/2020    Procedure: INJECTION, STEROID, EPIDURAL, TRANSFORAMINAL,  APPROACH, L3-L4 and L4-L5 need consent;  Surgeon: Tj Mayfield MD;  Location: [facility]IN [facility]GT;  Service: Pain Management;  Laterality: Right;    TRANSFORAMINAL EPIDURAL INJECTION OF STEROID Bilateral 7/23/2021    Procedure: INJECTION, STEROID, EPIDURAL, TRANSFORAMINAL APPROACH L4/5;  Surgeon: Tj Mayfield MD;  Location: [facility]IN [facility]GT;  Service: Pain Management;  Laterality: Bilateral;    TRIGGER POINT INJECTION N/A 7/23/2021    Procedure: INJECTION, TRIGGER POINT SCAPULAR;  Surgeon: Tj Mayfield MD;  Location: [facility]IN [facility]GT;  Service: Pain Management;  Laterality: N/A;    TUBAL LIGATION  2003    UPPER GASTROINTESTINAL ENDOSCOPY      10/11    uterine ablation  2003       Review of patient's allergies indicates:   Allergen Reactions    Actemra [tocilizumab] Other (See Comments)     Severe dizziness    Codeine Nausea And Vomiting    Gold au 198 Hives and Rash    Hydroxychloroquine Other (See Comments)     Can't remember the reaction      Iodinated contrast media Other (See Comments)     Other reaction(s): BURNING ALL OVER    Iodine Other (See Comments)     Other reaction(s): BURNING ALL OVER - Iodine dye - Not topical    Sulfa (sulfonamide antibiotics) Other (See Comments)     Can't remember the reaction    Zofran [ondansetron hcl (pf)] Nausea And Vomiting     Pt reports that last time she received zofran she started vomiting again    Methotrexate analogues Nausea Only    Pneumovax 23 [pneumococcal 23-argenis ps vaccine] Other (See Comments)     "sick"       Current Outpatient Medications   Medication Sig Dispense Refill    " albuterol (PROVENTIL HFA) 90 mcg/actuation inhaler Inhale 2 puffs into the lungs every 6 (six) hours as needed. Rescue 20.1 g 11    aspirin 81 mg Tab Take 81 mg by mouth every morning.       budesonide-formoterol 160-4.5 mcg (SYMBICORT) 160-4.5 mcg/actuation HFAA Inhale 2 puffs into the lungs every 12 (twelve) hours. 3 Inhaler 3    calcium citrate-vitamin D3 315-200 mg (CITRACAL+D) 315 mg-5 mcg (200 unit) per tablet Take 1 tablet by mouth 2 (two) times daily.       conjugated estrogens (PREMARIN) vaginal cream insert 1 gram vaginally as directed 30 g 4    cyclobenzaprine (FLEXERIL) 5 MG tablet Take 1 tablet (5 mg total) by mouth nightly as needed for Muscle spasms. May take 1 additional 5mg tab nightly(total 10mg)  if needed 90 tablet 1    cycloSPORINE (RESTASIS) 0.05 % ophthalmic emulsion Instill one drop into both eyes two times per day 60 each 10    diclofenac sodium (VOLTAREN) 1 % Gel APPLY 2 GRAMS TOPICALLY 4 (FOUR) TIMES DAILY AS NEEDED. 300 g 2    estrogens, conjugated, (PREMARIN) 0.625 MG tablet take 1 tablet by mouth daily 90 tablet 4    halobetasol propion-tazarotene (DUOBRII) 0.01-0.045 % Lotn aaa on feet and hands qhs  then vaseline and warm towel 100 g 3    INV PROPULSID 10 MG Take 1 tablets (20 mg) by mouth 4 (four) times daily. FOR INVESTIGATIONAL USE ONLY. Protocol CIS-USA-154 1220 each 0    ketoconazole (NIZORAL) 2 % cream Apply to feet twice daily for 3 weeks (Patient taking differently: Apply to feet twice daily for 3 weeks) 60 g 3    leflunomide (ARAVA) 20 MG Tab TAKE 1 TABLET BY MOUTH EVERY DAY 90 tablet 0    lifitegrast (XIIDRA) 5 % Dpet INSTILL ONE DROPP INTO BOTH EYES TWICE A DAY 60 mL 10    methenamine (HIPREX) 1 gram Tab Take 1 tablet (1 g total) by mouth 2 (two) times daily. 180 tablet 3    montelukast (SINGULAIR) 10 mg tablet Take 1 tablet (10 mg total) by mouth nightly. 90 tablet 3    naproxen (NAPROSYN) 500 MG tablet TAKE 1 TABLET BY MOUTH TWICE A DAY AS NEEDED 180  tablet 0    omeprazole (PRILOSEC) 40 MG capsule Take 1 capsule (40 mg total) by mouth every morning. 30 capsule 6    omeprazole-sodium bicarbonate (ZEGERID) 40-1.1 mg-gram per capsule TAKE 1 CAPSULE BY MOUTH EVERY DAY IN THE MORNING 90 capsule 3    POTASSIUM ORAL Take by mouth once daily.      predniSONE (DELTASONE) 1 MG tablet Take 4 mg by mouth once daily.      predniSONE (DELTASONE) 5 MG tablet TAKE 1 TABLET BY MOUTH EVERY DAY 90 tablet 1    pregabalin (LYRICA) 50 MG capsule Take 1 capsule (50 mg total) by mouth every evening. May increase to 2 caps(100mg) nightly after 1 wk. If tolerated may increase to 3 caps(150mg) nightly after additional week 90 capsule 2    progesterone (PROMETRIUM) 100 MG capsule Take 1 capsule by mouth daily. 90 capsule 1    progesterone (PROMETRIUM) 100 MG capsule take 1 capsule by mouth daily 90 capsule 4    promethazine (PHENERGAN) 25 MG tablet Take 1 tablet (25 mg total) by mouth every 6 (six) hours as needed for Nausea. 30 tablet 1    rosuvastatin (CRESTOR) 20 MG tablet Take 1 tablet (20 mg total) by mouth every evening. 90 tablet 3    sarilumab (KEVZARA) 200 mg/1.14 mL Syrg Inject 1.14 mLs (200 mg total) into the skin every 14 (fourteen) days. 2.28 mL 2    sucralfate (CARAFATE) 1 gram tablet Take 1 tablet (1 g total) by mouth 4 (four) times daily. 360 tablet 3    traMADoL (ULTRAM) 50 mg tablet TAKE 1 TABLET (50 MG TOTAL) BY MOUTH EVERY 12 (TWELVE) HOURS AS NEEDED FOR PAIN. 60 tablet 0    albuterol-ipratropium (DUO-NEB) 2.5 mg-0.5 mg/3 mL nebulizer solution Take 3 mLs by nebulization every 6 (six) hours as needed for Wheezing. Rescue 1 Box 0    mirabegron (MYRBETRIQ) 25 mg Tb24 ER tablet Take 1 tablet (25 mg total) by mouth once daily. 30 tablet 11     No current facility-administered medications for this visit.       Family History   Problem Relation Age of Onset    Cancer Mother         Lung Cancer    Emphysema Mother     Heart attack Mother     Alcohol abuse  Mother     Cancer Father         Lung Cancer    Osteoarthritis Father     Lung cancer Father     Skin cancer Father     Alcohol abuse Father     Heart disease Brother     Heart attack Brother     Alcohol abuse Brother     Cirrhosis Brother     Osteoarthritis Paternal Aunt     Retinal detachment Maternal Aunt     No Known Problems Sister     No Known Problems Maternal Uncle     No Known Problems Paternal Uncle     No Known Problems Maternal Grandmother     No Known Problems Maternal Grandfather     No Known Problems Paternal Grandmother     No Known Problems Paternal Grandfather     Colon cancer Neg Hx     Esophageal cancer Neg Hx     Stomach cancer Neg Hx     Celiac disease Neg Hx     Diabetes Neg Hx     Thyroid disease Neg Hx     Amblyopia Neg Hx     Blindness Neg Hx     Cataracts Neg Hx     Glaucoma Neg Hx     Hypertension Neg Hx     Macular degeneration Neg Hx     Strabismus Neg Hx     Stroke Neg Hx        Social History     Socioeconomic History    Marital status:    Tobacco Use    Smoking status: Never Smoker    Smokeless tobacco: Never Used   Substance and Sexual Activity    Alcohol use: Yes     Comment: 2-3 times per year.    Drug use: No    Sexual activity: Yes     Partners: Male     Birth control/protection: Surgical     Social Determinants of Health     Financial Resource Strain: Low Risk     Difficulty of Paying Living Expenses: Not hard at all   Food Insecurity: No Food Insecurity    Worried About Running Out of Food in the Last Year: Never true    Ran Out of Food in the Last Year: Never true   Transportation Needs: No Transportation Needs    Lack of Transportation (Medical): No    Lack of Transportation (Non-Medical): No   Physical Activity: Unknown    Days of Exercise per Week: 2 days   Stress: No Stress Concern Present    Feeling of Stress : Not at all   Social Connections: Unknown    Frequency of Communication with Friends and Family: More than  "three times a week    Frequency of Social Gatherings with Friends and Family: Twice a week    Active Member of Clubs or Organizations: Yes    Attends Club or Organization Meetings: 1 to 4 times per year    Marital Status:    Housing Stability: Low Risk     Unable to Pay for Housing in the Last Year: No    Number of Places Lived in the Last Year: 1    Unstable Housing in the Last Year: No           ROS        Objective:   BP (!) 145/83   Pulse 104   Temp 97.7 °F (36.5 °C)   Resp 18   Ht 5' 1" (1.549 m)   Wt 69.9 kg (154 lb)   LMP  (LMP Unknown)   BMI 29.10 kg/m²   Pain Disability Index Review:  Last 3 PDI Scores 2/9/2022 12/8/2021 11/1/2021   Pain Disability Index (PDI) 40 50 37     Normocephalic.  Atraumatic.  Affect appropriate.  Breathing unlabored.  Extra ocular muscles intact.           Ortho/SPM Exam    positive straight leg raise on the right side.  Positive than the sternal on the right ischial tuberosity.  Positive pain with stretches of the hamstrings.  Lumbar flexion, hyperextension facet loading are equivocal.  Assessment:       Encounter Diagnoses   Name Primary?    Ischial bursitis of right side Yes    Lumbar radiculopathy          Plan:   We discussed with the patient the assessment and recommendations. The following is the plan we agreed on:  1. I showed the patient his stretches for hamstrings.  I advised her to ice and stretch.  2. Schedule patient for right ischial bursa injection under fluoroscopy.  3. Return as needed.  Otherwise follow-up 2 weeks after the procedure.  Should her symptomatology persists with radicular component consider caudal epidural steroid injection with a catheter.      Joyce was seen today for low-back pain.    Diagnoses and all orders for this visit:    Ischial bursitis of right side  -     Procedure Order to Pain Management; Future    Lumbar radiculopathy                 "

## 2022-02-09 NOTE — PROGRESS NOTES
Subjective:       Patient ID:  Joyce Peterson is a 61 y.o. female who presents for   Chief Complaint   Patient presents with    Follow-up     Dry skin is better      Pt here for rash to foot follow up. Using duobrii and urea cream and feels she is improved    Final Pathologic Diagnosis      Date                     Value               Ref Range           Status               01/26/2022                                                       Final            Skin, left lateral heel, punch biopsy: - SKIN WITH SUPERFICIAL FUNGAL INFECTION   Comment:  Interp By Marilyn Alberto M.D., Signed on 02/09/2022 at 10:27  ----------          Follow-up - Follow-up  Symptom course: improving  Affected locations: right foot and left foot  Signs / symptoms: asymptomatic  Severity: mild        Review of Systems   Constitutional: Negative for fever, chills and fatigue.   Skin: Positive for itching, rash and dry skin. Negative for daily sunscreen use, recent sunburn and wears hat.   Hematologic/Lymphatic: Does not bruise/bleed easily.        Objective:    Physical Exam   Constitutional: She appears well-developed and well-nourished. No distress.   Neurological: She is alert and oriented to person, place, and time. She is not disoriented.   Psychiatric: She has a normal mood and affect.   Skin:   Areas Examined (abnormalities noted in diagram):   RUE Inspected  LUE Inspection Performed  RLE Inspected  LLE Inspection Performed  Nails and Digits Inspection Performed                 Diagram Legend     Erythematous scaling macule/papule c/w actinic keratosis       Vascular papule c/w angioma      Pigmented verrucoid papule/plaque c/w seborrheic keratosis      Yellow umbilicated papule c/w sebaceous hyperplasia      Irregularly shaped tan macule c/w lentigo     1-2 mm smooth white papules consistent with Milia      Movable subcutaneous cyst with punctum c/w epidermal inclusion cyst      Subcutaneous movable cyst c/w pilar cyst       Firm pink to brown papule c/w dermatofibroma      Pedunculated fleshy papule(s) c/w skin tag(s)      Evenly pigmented macule c/w junctional nevus     Mildly variegated pigmented, slightly irregular-bordered macule c/w mildly atypical nevus      Flesh colored to evenly pigmented papule c/w intradermal nevus       Pink pearly papule/plaque c/w basal cell carcinoma      Erythematous hyperkeratotic cursted plaque c/w SCC      Surgical scar with no sign of skin cancer recurrence      Open and closed comedones      Inflammatory papules and pustules      Verrucoid papule consistent consistent with wart     Erythematous eczematous patches and plaques     Dystrophic onycholytic nail with subungual debris c/w onychomycosis     Umbilicated papule    Erythematous-base heme-crusted tan verrucoid plaque consistent with inflamed seborrheic keratosis     Erythematous Silvery Scaling Plaque c/w Psoriasis     See annotation      Assessment / Plan:        Tinea pedis of both feet/Keratoderma  KOH positive on bx   -     ketoconazole (NIZORAL) 2 % cream; Apply to feet bid x 3 weeks  Dispense: 60 g; Refill: 3  Feet:  Ketoconazole cream 2x per day  X 3 weeks then can resume Duobrii  Urea 1-2x per day       Hands - continue urea cream     Consider Resta cream              Follow up in about 3 months (around 5/9/2022).

## 2022-02-10 ENCOUNTER — TELEPHONE (OUTPATIENT)
Dept: PAIN MEDICINE | Facility: OTHER | Age: 62
End: 2022-02-10
Payer: MEDICARE

## 2022-02-10 ENCOUNTER — PATIENT MESSAGE (OUTPATIENT)
Dept: PAIN MEDICINE | Facility: OTHER | Age: 62
End: 2022-02-10
Payer: MEDICARE

## 2022-02-10 DIAGNOSIS — M70.71 ISCHIAL BURSITIS OF RIGHT SIDE: Primary | ICD-10-CM

## 2022-02-11 ENCOUNTER — SPECIALTY PHARMACY (OUTPATIENT)
Dept: PHARMACY | Facility: CLINIC | Age: 62
End: 2022-02-11
Payer: MEDICARE

## 2022-02-11 ENCOUNTER — TELEPHONE (OUTPATIENT)
Dept: ORTHOPEDICS | Facility: CLINIC | Age: 62
End: 2022-02-11
Payer: MEDICARE

## 2022-02-11 ENCOUNTER — PATIENT MESSAGE (OUTPATIENT)
Dept: PHARMACY | Facility: CLINIC | Age: 62
End: 2022-02-11
Payer: MEDICARE

## 2022-02-11 NOTE — TELEPHONE ENCOUNTER
Spoke c pt. Confirmed appt location & time c Dr. Humphrey 02/15/22. Pt expressed understanding & was thankful.

## 2022-02-11 NOTE — TELEPHONE ENCOUNTER
Specialty Pharmacy - Refill Coordination    Specialty Medication Orders Linked to Encounter    Flowsheet Row Most Recent Value   Medication #1 sarilumab (KEVZARA) 200 mg/1.14 mL Syrg (Order#[id number], Rx#[id number])          Refill Questions - Documented Responses    Flowsheet Row Most Recent Value   Patient Availability and HIPAA Verification    Does patient want to proceed with activity? Yes   HIPAA/medical authority confirmed? Yes   Relationship to patient of person spoken to? Self   Refill Screening Questions    Changes to allergies? No   Changes to medications? No   New conditions since last clinic visit? No   Unplanned office visit, urgent care, ED, or hospital admission in the last 4 weeks? No   How does patient/caregiver feel medication is working? Good   Financial problems or insurance changes? No   How many doses of your specialty medications were missed in the last 4 weeks? 0   Would patient like to speak to a pharmacist? No   When does the patient need to receive the medication? 02/21/22   Refill Delivery Questions    How will the patient receive the medication? Delivery Jaymie   When does the patient need to receive the medication? 02/21/22   Shipping Address Home   Address in [facility] confirmed and updated if neccessary? Yes   Expected Copay ($) 55   Is the patient able to afford the medication copay? Yes   Payment Method CC on file   Days supply of Refill 28   Supplies needed? No supplies needed   Refill activity completed? Yes   Refill activity plan Refill scheduled   Shipment/Pickup Date: 02/16/22          Current Outpatient Medications   Medication Sig    albuterol (PROVENTIL HFA) 90 mcg/actuation inhaler Inhale 2 puffs into the lungs every 6 (six) hours as needed. Rescue    albuterol-ipratropium (DUO-NEB) 2.5 mg-0.5 mg/3 mL nebulizer solution Take 3 mLs by nebulization every 6 (six) hours as needed for Wheezing. Rescue    aspirin 81 mg Tab Take 81 mg by mouth every morning.      budesonide-formoterol 160-4.5 mcg (SYMBICORT) 160-4.5 mcg/actuation HFAA Inhale 2 puffs into the lungs every 12 (twelve) hours.    calcium citrate-vitamin D3 315-200 mg (CITRACAL+D) 315 mg-5 mcg (200 unit) per tablet Take 1 tablet by mouth 2 (two) times daily.     conjugated estrogens (PREMARIN) vaginal cream insert 1 gram vaginally as directed    cyclobenzaprine (FLEXERIL) 5 MG tablet Take 1 tablet (5 mg total) by mouth nightly as needed for Muscle spasms. May take 1 additional 5mg tab nightly(total 10mg)  if needed    cycloSPORINE (RESTASIS) 0.05 % ophthalmic emulsion Instill one drop into both eyes two times per day    diclofenac sodium (VOLTAREN) 1 % Gel APPLY 2 GRAMS TOPICALLY 4 (FOUR) TIMES DAILY AS NEEDED.    estrogens, conjugated, (PREMARIN) 0.625 MG tablet take 1 tablet by mouth daily    halobetasol propion-tazarotene (DUOBRII) 0.01-0.045 % Lotn aaa on feet and hands qhs  then vaseline and warm towel    INV PROPULSID 10 MG Take 1 tablets (20 mg) by mouth 4 (four) times daily. FOR INVESTIGATIONAL USE ONLY. Protocol [id number]    ketoconazole (NIZORAL) 2 % cream Apply to feet twice daily for 3 weeks (Patient taking differently: Apply to feet twice daily for 3 weeks)    leflunomide (ARAVA) 20 MG Tab TAKE 1 TABLET BY MOUTH EVERY DAY    lifitegrast (XIIDRA) 5 % Dpet INSTILL ONE DROPP INTO BOTH EYES TWICE A DAY    methenamine (HIPREX) 1 gram Tab Take 1 tablet (1 g total) by mouth 2 (two) times daily.    mirabegron (MYRBETRIQ) 25 mg Tb24 ER tablet Take 1 tablet (25 mg total) by mouth once daily.    montelukast (SINGULAIR) 10 mg tablet Take 1 tablet (10 mg total) by mouth nightly.    naproxen (NAPROSYN) 500 MG tablet TAKE 1 TABLET BY MOUTH TWICE A DAY AS NEEDED    omeprazole (PRILOSEC) 40 MG capsule Take 1 capsule (40 mg total) by mouth every morning.    omeprazole-sodium bicarbonate (ZEGERID) 40-1.1 mg-gram per capsule TAKE 1 CAPSULE BY MOUTH EVERY DAY IN THE MORNING    POTASSIUM ORAL Take by  "mouth once daily.    predniSONE (DELTASONE) 1 MG tablet Take 4 mg by mouth once daily.    predniSONE (DELTASONE) 5 MG tablet TAKE 1 TABLET BY MOUTH EVERY DAY    pregabalin (LYRICA) 50 MG capsule Take 1 capsule (50 mg total) by mouth every evening. May increase to 2 caps(100mg) nightly after 1 wk. If tolerated may increase to 3 caps(150mg) nightly after additional week    progesterone (PROMETRIUM) 100 MG capsule Take 1 capsule by mouth daily.    progesterone (PROMETRIUM) 100 MG capsule take 1 capsule by mouth daily    promethazine (PHENERGAN) 25 MG tablet Take 1 tablet (25 mg total) by mouth every 6 (six) hours as needed for Nausea.    rosuvastatin (CRESTOR) 20 MG tablet Take 1 tablet (20 mg total) by mouth every evening.    sarilumab (KEVZARA) 200 mg/1.14 mL Syrg Inject 1.14 mLs (200 mg total) into the skin every 14 (fourteen) days.    sucralfate (CARAFATE) 1 gram tablet Take 1 tablet (1 g total) by mouth 4 (four) times daily.    traMADoL (ULTRAM) 50 mg tablet TAKE 1 TABLET (50 MG TOTAL) BY MOUTH EVERY 12 (TWELVE) HOURS AS NEEDED FOR PAIN.   Last reviewed on 2/9/2022  2:03 PM by Tj Mayfield MD    Review of patient's allergies indicates:   Allergen Reactions    Actemra [tocilizumab] Other (See Comments)     Severe dizziness    Codeine Nausea And Vomiting    Gold au 198 Hives and Rash    Hydroxychloroquine Other (See Comments)     Can't remember the reaction      Iodinated contrast media Other (See Comments)     Other reaction(s): BURNING ALL OVER    Iodine Other (See Comments)     Other reaction(s): BURNING ALL OVER - Iodine dye - Not topical    Sulfa (sulfonamide antibiotics) Other (See Comments)     Can't remember the reaction    Zofran [ondansetron hcl (pf)] Nausea And Vomiting     Pt reports that last time she received zofran she started vomiting again    Methotrexate analogues Nausea Only    Pneumovax 23 [pneumococcal 23-argenis ps vaccine] Other (See Comments)     "sick"    Last reviewed on  " 2/9/2022 2:03 PM by Tj Mayfield      Tasks added this encounter   3/14/2022 - Refill Call (Auto Added)   Tasks due within next 3 months   No tasks due.     Ankita Huitron, PharmD  [facility]  [address] [place], [place] [address]  [facility] [address]  Phone: [phone]  Fax: [phone]

## 2022-02-14 ENCOUNTER — OFFICE VISIT (OUTPATIENT)
Dept: PAIN MEDICINE | Facility: CLINIC | Age: 62
End: 2022-02-14
Payer: MEDICARE

## 2022-02-14 ENCOUNTER — PATIENT MESSAGE (OUTPATIENT)
Dept: ORTHOPEDICS | Facility: CLINIC | Age: 62
End: 2022-02-14
Payer: MEDICARE

## 2022-02-14 VITALS
HEART RATE: 98 BPM | DIASTOLIC BLOOD PRESSURE: 83 MMHG | RESPIRATION RATE: 18 BRPM | WEIGHT: 154 LBS | TEMPERATURE: 98 F | HEIGHT: 61 IN | BODY MASS INDEX: 29.07 KG/M2 | SYSTOLIC BLOOD PRESSURE: 136 MMHG

## 2022-02-14 DIAGNOSIS — M54.16 LUMBAR RADICULOPATHY: ICD-10-CM

## 2022-02-14 DIAGNOSIS — G89.29 OTHER CHRONIC PAIN: ICD-10-CM

## 2022-02-14 DIAGNOSIS — M51.36 DDD (DEGENERATIVE DISC DISEASE), LUMBAR: ICD-10-CM

## 2022-02-14 DIAGNOSIS — M47.816 LUMBAR SPONDYLOSIS: ICD-10-CM

## 2022-02-14 DIAGNOSIS — M79.18 MYOFASCIAL PAIN: Primary | ICD-10-CM

## 2022-02-14 DIAGNOSIS — M62.830 MUSCLE SPASM OF BACK: ICD-10-CM

## 2022-02-14 PROCEDURE — 1159F MED LIST DOCD IN RCRD: CPT | Mod: CPTII,S$GLB,, | Performed by: NURSE PRACTITIONER

## 2022-02-14 PROCEDURE — 99999 PR PBB SHADOW E&M-EST. PATIENT-LVL V: CPT | Mod: PBBFAC,,, | Performed by: NURSE PRACTITIONER

## 2022-02-14 PROCEDURE — 1160F RVW MEDS BY RX/DR IN RCRD: CPT | Mod: CPTII,S$GLB,, | Performed by: NURSE PRACTITIONER

## 2022-02-14 PROCEDURE — 3079F PR MOST RECENT DIASTOLIC BLOOD PRESSURE 80-89 MM HG: ICD-10-PCS | Mod: CPTII,S$GLB,, | Performed by: NURSE PRACTITIONER

## 2022-02-14 PROCEDURE — 99214 OFFICE O/P EST MOD 30 MIN: CPT | Mod: 25,S$GLB,, | Performed by: NURSE PRACTITIONER

## 2022-02-14 PROCEDURE — 1159F PR MEDICATION LIST DOCUMENTED IN MEDICAL RECORD: ICD-10-PCS | Mod: CPTII,S$GLB,, | Performed by: NURSE PRACTITIONER

## 2022-02-14 PROCEDURE — 20553 NJX 1/MLT TRIGGER POINTS 3/>: CPT | Mod: S$GLB,,, | Performed by: NURSE PRACTITIONER

## 2022-02-14 PROCEDURE — 3008F BODY MASS INDEX DOCD: CPT | Mod: CPTII,S$GLB,, | Performed by: NURSE PRACTITIONER

## 2022-02-14 PROCEDURE — 3079F DIAST BP 80-89 MM HG: CPT | Mod: CPTII,S$GLB,, | Performed by: NURSE PRACTITIONER

## 2022-02-14 PROCEDURE — 20553 PR INJECT TRIGGER POINTS, > 3: ICD-10-PCS | Mod: S$GLB,,, | Performed by: NURSE PRACTITIONER

## 2022-02-14 PROCEDURE — 3075F PR MOST RECENT SYSTOLIC BLOOD PRESS GE 130-139MM HG: ICD-10-PCS | Mod: CPTII,S$GLB,, | Performed by: NURSE PRACTITIONER

## 2022-02-14 PROCEDURE — 99999 PR PBB SHADOW E&M-EST. PATIENT-LVL V: ICD-10-PCS | Mod: PBBFAC,,, | Performed by: NURSE PRACTITIONER

## 2022-02-14 PROCEDURE — 1160F PR REVIEW ALL MEDS BY PRESCRIBER/CLIN PHARMACIST DOCUMENTED: ICD-10-PCS | Mod: CPTII,S$GLB,, | Performed by: NURSE PRACTITIONER

## 2022-02-14 PROCEDURE — 3008F PR BODY MASS INDEX (BMI) DOCUMENTED: ICD-10-PCS | Mod: CPTII,S$GLB,, | Performed by: NURSE PRACTITIONER

## 2022-02-14 PROCEDURE — 3075F SYST BP GE 130 - 139MM HG: CPT | Mod: CPTII,S$GLB,, | Performed by: NURSE PRACTITIONER

## 2022-02-14 PROCEDURE — 99214 PR OFFICE/OUTPT VISIT, EST, LEVL IV, 30-39 MIN: ICD-10-PCS | Mod: 25,S$GLB,, | Performed by: NURSE PRACTITIONER

## 2022-02-14 RX ORDER — BUPIVACAINE HYDROCHLORIDE 2.5 MG/ML
5 INJECTION, SOLUTION EPIDURAL; INFILTRATION; INTRACAUDAL
Status: COMPLETED | OUTPATIENT
Start: 2022-02-14 | End: 2022-02-14

## 2022-02-14 RX ORDER — METHYLPREDNISOLONE ACETATE 80 MG/ML
40 INJECTION, SUSPENSION INTRA-ARTICULAR; INTRALESIONAL; INTRAMUSCULAR; SOFT TISSUE
Status: COMPLETED | OUTPATIENT
Start: 2022-02-14 | End: 2022-02-14

## 2022-02-14 RX ADMIN — BUPIVACAINE HYDROCHLORIDE 12.5 MG: 2.5 INJECTION, SOLUTION EPIDURAL; INFILTRATION; INTRACAUDAL at 11:02

## 2022-02-14 RX ADMIN — METHYLPREDNISOLONE ACETATE 40 MG: 80 INJECTION, SUSPENSION INTRA-ARTICULAR; INTRALESIONAL; INTRAMUSCULAR; SOFT TISSUE at 11:02

## 2022-02-14 NOTE — PROGRESS NOTES
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 Aunt     Retinal detachment Maternal Aunt     No Known Problems Sister     No Known Problems Maternal Uncle     No Known Problems Paternal Uncle     No Known Problems Maternal Grandmother     No Known Problems Maternal Grandfather     No Known Problems Paternal Grandmother     No Known Problems Paternal Grandfather     Colon cancer Neg Hx     Esophageal cancer Neg Hx     Stomach cancer Neg Hx     Celiac disease Neg Hx     Diabetes Neg Hx     Thyroid disease Neg Hx     Amblyopia Neg Hx     Blindness Neg Hx     Cataracts Neg Hx     Glaucoma Neg Hx     Hypertension Neg Hx     Macular degeneration Neg Hx     Strabismus Neg Hx     Stroke Neg Hx        Social History:  Social History     Socioeconomic History    Marital status:    Tobacco Use    Smoking status: Never Smoker    Smokeless tobacco: Never Used   Substance and Sexual Activity    Alcohol use: Yes     Comment: 2-3 times per year.    Drug use: No    Sexual activity: Yes     Partners: Male     Birth control/protection: Surgical     Social Determinants of Health     Financial Resource Strain: Low Risk     Difficulty of Paying Living Expenses: Not hard at all   Food Insecurity: No Food Insecurity    Worried About Running Out of Food in the Last Year: Never true    Ran Out of Food in the Last Year: Never true   Transportation Needs: No Transportation Needs    Lack of  "Transportation (Medical): No    Lack of Transportation (Non-Medical): No   Physical Activity: Unknown    Days of Exercise per Week: 2 days   Stress: No Stress Concern Present    Feeling of Stress : Not at all   Social Connections: Unknown    Frequency of Communication with Friends and Family: More than three times a week    Frequency of Social Gatherings with Friends and Family: Twice a week    Active Member of Clubs or Organizations: Yes    Attends Club or Organization Meetings: 1 to 4 times per year    Marital Status:    Housing Stability: Low Risk     Unable to Pay for Housing in the Last Year: No    Number of Places Lived in the Last Year: 1    Unstable Housing in the Last Year: No       OBJECTIVE:    /83   Pulse 98   Temp 97.5 °F (36.4 °C)   Resp 18   Ht 5' 1" (1.549 m)   Wt 69.9 kg (154 lb)   LMP  (LMP Unknown)   BMI 29.10 kg/m²     PHYSICAL EXAMINATION:    General appearance: Well appearing, in no acute distress, alert and oriented x3.  Psych:  Mood and affect appropriate.  Back: TTP over lumbar facet joints on the right side. TTP over thoracic and lumbar paraspinal muscles on the left side. Limited ROM with pain on lumbar extension and flexion.  Positive facet loading bilaterally L>R. Pain on extension > flexion. Pain with lateral flexion to left.  Negative SLR bilaterally.  MSK: 5/5 strength in right ankle with plantar and dorsiflexion. 5/5 strength in left ankle with plantar and dorsiflexion. 4/5 strength with right knee flexion and extension. 5/5 strength with left knee flexion and extension. 5/5 strength in right EHL, 5/5 strength in left EHL.  Neuro: No loss of sensation.  Kelsey is negative bilaterally.  Gait: Antalgic- ambulates without assistance.    ASSESSMENT: 61 y.o. year old female with right sided back and hip pain, consistent with the following diagnoses:     1. Lumbar radiculopathy     2. Lumbar spondylosis     3. DDD (degenerative disc disease), lumbar     4. " Other chronic pain     5. Muscle spasm of back           PLAN:     - Previous imaging was reviewed and discussed with the patient today.    - Acute pain today is most consistent with muscle inflammation. Will give TPIs today as per below. Discussed stretching for this area to prevent further injury.    - She is scheduled for right ischial bursa injection on 2/17/22. Will move this back by 2 weeks given steroids today.    - Can continue Zanaflex 2-4 mg as needed. However, we discussed trying to hold off on this given liver treatment. Also avoid NSAIDs.    - RTC 2 weeks after ischial bursa injection- she will contact me if limited benefit from TPIs today.    - Counseled patient regarding the importance of constant sleeping habits and physical therapy.        The above plan and management options were discussed at length with patient. Patient is in agreement with the above and verbalized understanding.    Valarie Law  02/14/2022      Trigger Point Injection:   The procedure was discussed with the patient including complications of nerve damage,  bleeding, infection, and failure of pain relief.   Trigger points were identified by palpation and marked. Alcohol prep of sites done. A mixture of 5mL 0.25% bupivacaine +40mg Depo-Medrol was prepared (6 mL total).   A 27-gauge needle was advanced to the point of maximal tenderness, and medication was injected after negative aspiration. All sites done in the same manner. Patient tolerated the procedure well and without complications. Sites injected included: thoracic and lumbar paraspinals on the left (3 sites total). The patient denies any adverse effects including dizziness or shortness or breath.

## 2022-02-14 NOTE — H&P (VIEW-ONLY)
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 Aunt     Retinal detachment Maternal Aunt     No Known Problems Sister     No Known Problems Maternal Uncle     No Known Problems Paternal Uncle     No Known Problems Maternal Grandmother     No Known Problems Maternal Grandfather     No Known Problems Paternal Grandmother     No Known Problems Paternal Grandfather     Colon cancer Neg Hx     Esophageal cancer Neg Hx     Stomach cancer Neg Hx     Celiac disease Neg Hx     Diabetes Neg Hx     Thyroid disease Neg Hx     Amblyopia Neg Hx     Blindness Neg Hx     Cataracts Neg Hx     Glaucoma Neg Hx     Hypertension Neg Hx     Macular degeneration Neg Hx     Strabismus Neg Hx     Stroke Neg Hx        Social History:  Social History     Socioeconomic History    Marital status:    Tobacco Use    Smoking status: Never Smoker    Smokeless tobacco: Never Used   Substance and Sexual Activity    Alcohol use: Yes     Comment: 2-3 times per year.    Drug use: No    Sexual activity: Yes     Partners: Male     Birth control/protection: Surgical     Social Determinants of Health     Financial Resource Strain: Low Risk     Difficulty of Paying Living Expenses: Not hard at all   Food Insecurity: No Food Insecurity    Worried About Running Out of Food in the Last Year: Never true    Ran Out of Food in the Last Year: Never true   Transportation Needs: No Transportation Needs    Lack of  "Transportation (Medical): No    Lack of Transportation (Non-Medical): No   Physical Activity: Unknown    Days of Exercise per Week: 2 days   Stress: No Stress Concern Present    Feeling of Stress : Not at all   Social Connections: Unknown    Frequency of Communication with Friends and Family: More than three times a week    Frequency of Social Gatherings with Friends and Family: Twice a week    Active Member of Clubs or Organizations: Yes    Attends Club or Organization Meetings: 1 to 4 times per year    Marital Status:    Housing Stability: Low Risk     Unable to Pay for Housing in the Last Year: No    Number of Places Lived in the Last Year: 1    Unstable Housing in the Last Year: No       OBJECTIVE:    /83   Pulse 98   Temp 97.5 °F (36.4 °C)   Resp 18   Ht 5' 1" (1.549 m)   Wt 69.9 kg (154 lb)   LMP  (LMP Unknown)   BMI 29.10 kg/m²     PHYSICAL EXAMINATION:    General appearance: Well appearing, in no acute distress, alert and oriented x3.  Psych:  Mood and affect appropriate.  Back: TTP over lumbar facet joints on the right side. TTP over thoracic and lumbar paraspinal muscles on the left side. Limited ROM with pain on lumbar extension and flexion.  Positive facet loading bilaterally L>R. Pain on extension > flexion. Pain with lateral flexion to left.  Negative SLR bilaterally.  MSK: 5/5 strength in right ankle with plantar and dorsiflexion. 5/5 strength in left ankle with plantar and dorsiflexion. 4/5 strength with right knee flexion and extension. 5/5 strength with left knee flexion and extension. 5/5 strength in right EHL, 5/5 strength in left EHL.  Neuro: No loss of sensation.  Kelsey is negative bilaterally.  Gait: Antalgic- ambulates without assistance.    ASSESSMENT: 61 y.o. year old female with right sided back and hip pain, consistent with the following diagnoses:     1. Lumbar radiculopathy     2. Lumbar spondylosis     3. DDD (degenerative disc disease), lumbar     4. " Other chronic pain     5. Muscle spasm of back           PLAN:     - Previous imaging was reviewed and discussed with the patient today.    - Acute pain today is most consistent with muscle inflammation. Will give TPIs today as per below. Discussed stretching for this area to prevent further injury.    - She is scheduled for right ischial bursa injection on 2/17/22. Will move this back by 2 weeks given steroids today.    - Can continue Zanaflex 2-4 mg as needed. However, we discussed trying to hold off on this given liver treatment. Also avoid NSAIDs.    - RTC 2 weeks after ischial bursa injection- she will contact me if limited benefit from TPIs today.    - Counseled patient regarding the importance of constant sleeping habits and physical therapy.        The above plan and management options were discussed at length with patient. Patient is in agreement with the above and verbalized understanding.    Valarie Law  02/14/2022      Trigger Point Injection:   The procedure was discussed with the patient including complications of nerve damage,  bleeding, infection, and failure of pain relief.   Trigger points were identified by palpation and marked. Alcohol prep of sites done. A mixture of 5mL 0.25% bupivacaine +40mg Depo-Medrol was prepared (6 mL total).   A 27-gauge needle was advanced to the point of maximal tenderness, and medication was injected after negative aspiration. All sites done in the same manner. Patient tolerated the procedure well and without complications. Sites injected included: thoracic and lumbar paraspinals on the left (3 sites total). The patient denies any adverse effects including dizziness or shortness or breath.

## 2022-02-15 ENCOUNTER — PATIENT MESSAGE (OUTPATIENT)
Dept: PAIN MEDICINE | Facility: CLINIC | Age: 62
End: 2022-02-15
Payer: MEDICARE

## 2022-02-18 ENCOUNTER — TELEPHONE (OUTPATIENT)
Dept: ORTHOPEDICS | Facility: CLINIC | Age: 62
End: 2022-02-18
Payer: MEDICARE

## 2022-02-18 NOTE — TELEPHONE ENCOUNTER
"Attempted to contact pt. Call "could not be completed" after 8 rings.   "
Is This A New Presentation, Or A Follow-Up?: Growth
What Type Of Note Output Would You Prefer (Optional)?: Standard Output
How Severe Is Your Skin Lesion?: mild
Has Your Skin Lesion Been Treated?: not been treated
Which Family Member (Optional)?: father

## 2022-02-19 ENCOUNTER — PATIENT MESSAGE (OUTPATIENT)
Dept: PAIN MEDICINE | Facility: CLINIC | Age: 62
End: 2022-02-19
Payer: MEDICARE

## 2022-02-21 ENCOUNTER — HOSPITAL ENCOUNTER (OUTPATIENT)
Facility: OTHER | Age: 62
Discharge: HOME | End: 2022-02-21
Attending: ANESTHESIOLOGY | Admitting: ANESTHESIOLOGY
Payer: MEDICARE

## 2022-02-21 VITALS
WEIGHT: 152 LBS | DIASTOLIC BLOOD PRESSURE: 68 MMHG | TEMPERATURE: 98 F | SYSTOLIC BLOOD PRESSURE: 138 MMHG | HEART RATE: 77 BPM | BODY MASS INDEX: 29.84 KG/M2 | RESPIRATION RATE: 16 BRPM | HEIGHT: 60 IN | OXYGEN SATURATION: 98 %

## 2022-02-21 DIAGNOSIS — M70.71 ISCHIAL BURSITIS OF RIGHT SIDE: Primary | ICD-10-CM

## 2022-02-21 DIAGNOSIS — G89.29 CHRONIC PAIN: ICD-10-CM

## 2022-02-21 PROCEDURE — 77002 NEEDLE LOCALIZATION BY XRAY: CPT | Mod: 26 | Performed by: ANESTHESIOLOGY

## 2022-02-21 PROCEDURE — 77002 PR FLUOROSCOPIC GUIDANCE NEEDLE PLACEMENT: ICD-10-PCS | Mod: 26,,, | Performed by: ANESTHESIOLOGY

## 2022-02-21 PROCEDURE — 20610 DRAIN/INJ JOINT/BURSA W/O US: CPT | Mod: RT | Performed by: ANESTHESIOLOGY

## 2022-02-21 PROCEDURE — 77002 NEEDLE LOCALIZATION BY XRAY: CPT | Mod: 26,,, | Performed by: ANESTHESIOLOGY

## 2022-02-21 PROCEDURE — 25000003 PHARM REV CODE 250: Performed by: ANESTHESIOLOGY

## 2022-02-21 PROCEDURE — 20610 PR DRAIN/INJECT LARGE JOINT/BURSA: ICD-10-PCS | Mod: RT,,, | Performed by: ANESTHESIOLOGY

## 2022-02-21 PROCEDURE — 63600175 PHARM REV CODE 636 W HCPCS: Performed by: ANESTHESIOLOGY

## 2022-02-21 PROCEDURE — 20610 DRAIN/INJ JOINT/BURSA W/O US: CPT | Mod: RT,,, | Performed by: ANESTHESIOLOGY

## 2022-02-21 PROCEDURE — 25500020 PHARM REV CODE 255: Performed by: ANESTHESIOLOGY

## 2022-02-21 RX ORDER — TRIAMCINOLONE ACETONIDE 40 MG/ML
INJECTION, SUSPENSION INTRA-ARTICULAR; INTRAMUSCULAR
Status: DISCONTINUED | OUTPATIENT
Start: 2022-02-21 | End: 2022-02-21 | Stop reason: HOSPADM

## 2022-02-21 RX ORDER — DIPHENHYDRAMINE HYDROCHLORIDE 50 MG/ML
25 INJECTION INTRAMUSCULAR; INTRAVENOUS ONCE
Status: COMPLETED | OUTPATIENT
Start: 2022-02-21 | End: 2022-02-21

## 2022-02-21 RX ORDER — SODIUM CHLORIDE 9 MG/ML
500 INJECTION, SOLUTION INTRAVENOUS CONTINUOUS
Status: DISCONTINUED | OUTPATIENT
Start: 2022-02-21 | End: 2022-02-21 | Stop reason: HOSPADM

## 2022-02-21 RX ORDER — BUPIVACAINE HYDROCHLORIDE 2.5 MG/ML
INJECTION, SOLUTION EPIDURAL; INFILTRATION; INTRACAUDAL
Status: DISCONTINUED | OUTPATIENT
Start: 2022-02-21 | End: 2022-02-21 | Stop reason: HOSPADM

## 2022-02-21 RX ORDER — DIPHENHYDRAMINE HYDROCHLORIDE 50 MG/ML
25 INJECTION INTRAMUSCULAR; INTRAVENOUS ONCE
Status: DISCONTINUED | OUTPATIENT
Start: 2022-02-21 | End: 2022-02-21 | Stop reason: HOSPADM

## 2022-02-21 RX ORDER — MIDAZOLAM HYDROCHLORIDE 1 MG/ML
INJECTION INTRAMUSCULAR; INTRAVENOUS
Status: DISCONTINUED | OUTPATIENT
Start: 2022-02-21 | End: 2022-02-21 | Stop reason: HOSPADM

## 2022-02-21 RX ORDER — LIDOCAINE HYDROCHLORIDE 20 MG/ML
INJECTION, SOLUTION INFILTRATION; PERINEURAL
Status: DISCONTINUED | OUTPATIENT
Start: 2022-02-21 | End: 2022-02-21 | Stop reason: HOSPADM

## 2022-02-21 RX ORDER — ALPRAZOLAM 0.5 MG/1
1 TABLET ORAL ONCE
Status: DISCONTINUED | OUTPATIENT
Start: 2022-02-21 | End: 2022-02-21 | Stop reason: HOSPADM

## 2022-02-21 RX ADMIN — DIPHENHYDRAMINE HYDROCHLORIDE 25 MG: 50 INJECTION INTRAMUSCULAR; INTRAVENOUS at 10:02

## 2022-02-21 NOTE — DISCHARGE INSTRUCTIONS

## 2022-02-21 NOTE — OP NOTE
Ischial Bursa Injection under Fluoroscopic Guidance    The procedure, risks, benefits, and options were discussed with the patient. There are no contraindications to the procedure. The patent expressed understanding and agreed to the procedure. Informed written consent was obtained prior to the start of the procedure and can be found in the patient's chart.    PATIENT NAME: Joyce Peterson   MRN: [id number]     DATE OF PROCEDURE: 02/21/2022    PROCEDURE: Right Ischial Bursa Injection under Fluoroscopic Guidance    PRE-OP DIAGNOSIS: Ischial bursitis of right side [M70.71]    POST-OP DIAGNOSIS: Same    PHYSICIAN: Tj Mayfield MD    ASSISTANTS: Mic Kaufman MD  Anesthesia resident       MEDICATIONS INJECTED: Preservative-free Kenalog 40mg with 7cc of Bupivacine 0.25%     LOCAL ANESTHETIC INJECTED: Xylocaine 2%     SEDATION:   Versed 2mg and Fentanyl 0mcg                                                                                                                                                                                     Conscious sedation ordered by M.D. Patient re-evaluation prior to administration of conscious sedation. No changes noted in patient's status from initial evaluation. The patient's vital signs were monitored by RN and patient remained hemodynamically stable throughout the procedure.    Event Time In   Sedation Start 1023       ESTIMATED BLOOD LOSS: None    COMPLICATIONS: None    TECHNIQUE: Time-out was performed to identify the patient and procedure to be performed. With the patient laying in a prone position, the surgical area was prepped and draped in the usual sterile fashion using ChloraPrep and a fenestrated drape. The area overlying the ischial bursa was determined under fluoroscopy guidance. Skin anesthesia was achieved by injecting Lidocaine 2% over the injection sites. The ischial bursa was then approached with a 22 gauge, 5 inch spinal quinke needle that was introduced under  fluoroscopic guidance in the AP view. Once the needle tip was in the area of the bursa, and there was no blood aspiration, contrast dye Omnipaque (300mg/ml) was injected to confirm placement and there was no vascular runoff. 10 mL of the medication mixture listed above was injected slowly. The needles were removed and bleeding was nil. A sterile dressing was applied. No specimens collected. The patient tolerated the procedure well.    PAIN BEFORE THE PROCEDURE: 6-9/10    PAIN AFTER THE PROCEDURE: 0/10      The patient was monitored after the procedure in the recovery area. They were given post-procedure and discharge instructions to follow at home. The patient was discharged in a stable condition.        Tj Mayfield MD

## 2022-02-21 NOTE — BRIEF OP NOTE
Discharge Note  Short Stay      SUMMARY     Admit Date: 2/21/2022    Attending Physician: Tj Mayfield      Discharge Physician: Tj Mayfield      Discharge Date: 2/21/2022 10:28 AM    Procedure(s) (LRB):  Injection, Joint RIGHT ISCHIAL BURSA (Right)    Final Diagnosis: Ischial bursitis of right side [M70.71]    Disposition: Home or self care    Patient Instructions:   Current Discharge Medication List      CONTINUE these medications which have NOT CHANGED    Details   albuterol (PROVENTIL HFA) 90 mcg/actuation inhaler Inhale 2 puffs into the lungs every 6 (six) hours as needed. Rescue  Qty: 20.1 g, Refills: 11      albuterol-ipratropium (DUO-NEB) 2.5 mg-0.5 mg/3 mL nebulizer solution Take 3 mLs by nebulization every 6 (six) hours as needed for Wheezing. Rescue  Qty: 1 Box, Refills: 0      aspirin 81 mg Tab Take 81 mg by mouth every morning.       budesonide-formoterol 160-4.5 mcg (SYMBICORT) 160-4.5 mcg/actuation HFAA Inhale 2 puffs into the lungs every 12 (twelve) hours.  Qty: 3 Inhaler, Refills: 3    Associated Diagnoses: Chronic asthma, mild intermittent, uncomplicated      calcium citrate-vitamin D3 315-200 mg (CITRACAL+D) 315 mg-5 mcg (200 unit) per tablet Take 1 tablet by mouth 2 (two) times daily.       conjugated estrogens (PREMARIN) vaginal cream insert 1 gram vaginally as directed  Qty: 30 g, Refills: 4      cyclobenzaprine (FLEXERIL) 5 MG tablet Take 1 tablet (5 mg total) by mouth nightly as needed for Muscle spasms. May take 1 additional 5mg tab nightly(total 10mg)  if needed  Qty: 90 tablet, Refills: 1      cycloSPORINE (RESTASIS) 0.05 % ophthalmic emulsion Instill one drop into both eyes two times per day  Qty: 60 each, Refills: 10      diclofenac sodium (VOLTAREN) 1 % Gel APPLY 2 GRAMS TOPICALLY 4 (FOUR) TIMES DAILY AS NEEDED.  Qty: 300 g, Refills: 2      estrogens, conjugated, (PREMARIN) 0.625 MG tablet take 1 tablet by mouth daily  Qty: 90 tablet, Refills: 4      halobetasol propion-tazarotene  (DUOBRII) 0.01-0.045 % Lotn aaa on feet and hands qhs  then vaseline and warm towel  Qty: 100 g, Refills: 3    Associated Diagnoses: Psoriasiform dermatitis      INV PROPULSID 10 MG Take 1 tablets (20 mg) by mouth 4 (four) times daily. FOR INVESTIGATIONAL USE ONLY. Protocol [id number]  Qty: 1220 each, Refills: 0    Associated Diagnoses: Patient in clinical research study; Gastroparesis      ketoconazole (NIZORAL) 2 % cream Apply to feet twice daily for 3 weeks  Qty: 60 g, Refills: 3    Associated Diagnoses: Tinea pedis of both feet      leflunomide (ARAVA) 20 MG Tab TAKE 1 TABLET BY MOUTH EVERY DAY  Qty: 90 tablet, Refills: 0    Associated Diagnoses: Rheumatoid arthritis      lifitegrast (XIIDRA) 5 % Dpet INSTILL ONE DROPP INTO BOTH EYES TWICE A DAY  Qty: 60 mL, Refills: 10      methenamine (HIPREX) 1 gram Tab Take 1 tablet (1 g total) by mouth 2 (two) times daily.  Qty: 180 tablet, Refills: 3      mirabegron (MYRBETRIQ) 25 mg Tb24 ER tablet Take 1 tablet (25 mg total) by mouth once daily.  Qty: 30 tablet, Refills: 11      montelukast (SINGULAIR) 10 mg tablet Take 1 tablet (10 mg total) by mouth nightly.  Qty: 90 tablet, Refills: 3    Associated Diagnoses: Perennial allergic rhinitis      naproxen (NAPROSYN) 500 MG tablet TAKE 1 TABLET BY MOUTH TWICE A DAY AS NEEDED  Qty: 180 tablet, Refills: 0      omeprazole (PRILOSEC) 40 MG capsule Take 1 capsule (40 mg total) by mouth every morning.  Qty: 30 capsule, Refills: 6      omeprazole-sodium bicarbonate (ZEGERID) 40-1.1 mg-gram per capsule TAKE 1 CAPSULE BY MOUTH EVERY DAY IN THE MORNING  Qty: 90 capsule, Refills: 3      POTASSIUM ORAL Take by mouth once daily.      !! predniSONE (DELTASONE) 1 MG tablet Take 4 mg by mouth once daily.      !! predniSONE (DELTASONE) 5 MG tablet TAKE 1 TABLET BY MOUTH EVERY DAY  Qty: 90 tablet, Refills: 1      pregabalin (LYRICA) 50 MG capsule Take 1 capsule (50 mg total) by mouth every evening. May increase to 2 caps(100mg) nightly after  1 wk. If tolerated may increase to 3 caps(150mg) nightly after additional week  Qty: 90 capsule, Refills: 2    Associated Diagnoses: Fibromyalgia      !! progesterone (PROMETRIUM) 100 MG capsule Take 1 capsule by mouth daily.  Qty: 90 capsule, Refills: 1    Associated Diagnoses: Unspecified ovarian cyst, unspecified side; Other specified counseling      !! progesterone (PROMETRIUM) 100 MG capsule take 1 capsule by mouth daily  Qty: 90 capsule, Refills: 4      promethazine (PHENERGAN) 25 MG tablet Take 1 tablet (25 mg total) by mouth every 6 (six) hours as needed for Nausea.  Qty: 30 tablet, Refills: 1      rosuvastatin (CRESTOR) 20 MG tablet Take 1 tablet (20 mg total) by mouth every evening.  Qty: 90 tablet, Refills: 3    Associated Diagnoses: Coronary artery disease involving native coronary artery of native heart without angina pectoris      sarilumab (KEVZARA) 200 mg/1.14 mL Syrg Inject 1.14 mLs (200 mg total) into the skin every 14 (fourteen) days.  Qty: 2.28 mL, Refills: 2    Associated Diagnoses: Rheumatoid arthritis, involving unspecified site, unspecified whether rheumatoid factor present      sucralfate (CARAFATE) 1 gram tablet Take 1 tablet (1 g total) by mouth 4 (four) times daily.  Qty: 360 tablet, Refills: 3    Associated Diagnoses: Gastroparesis      traMADoL (ULTRAM) 50 mg tablet TAKE 1 TABLET (50 MG TOTAL) BY MOUTH EVERY 12 (TWELVE) HOURS AS NEEDED FOR PAIN.  Qty: 60 tablet, Refills: 0    Comments: Not to exceed 5 additional fills before 02/07/2022  Associated Diagnoses: Chronic hip pain, left       !! - Potential duplicate medications found. Please discuss with provider.              Discharge Diagnosis: Ischial bursitis of right side [M70.71]  Condition on Discharge: Stable with no complications to procedure   Diet on Discharge: Same as before.  Activity: as per instruction sheet.  Discharge to: Home with a responsible adult.  Follow up: 2-4 weeks       Please call my office or pager at  [phone] if experienced any weakness or loss of sensation, fever > 101.5, pain uncontrolled with oral medications, persistent nausea/vomiting/or diarrhea, redness or drainage from the incisions, or any other worrisome concerns. If physician on call was not reached or could not communicate with our office for any reason please go to the nearest emergency department

## 2022-02-22 ENCOUNTER — PATIENT MESSAGE (OUTPATIENT)
Dept: ORTHOPEDICS | Facility: CLINIC | Age: 62
End: 2022-02-22
Payer: MEDICARE

## 2022-02-22 ENCOUNTER — TELEPHONE (OUTPATIENT)
Dept: ADMINISTRATIVE | Facility: HOSPITAL | Age: 62
End: 2022-02-22
Payer: MEDICARE

## 2022-02-22 ENCOUNTER — PATIENT MESSAGE (OUTPATIENT)
Dept: RHEUMATOLOGY | Facility: CLINIC | Age: 62
End: 2022-02-22
Payer: MEDICARE

## 2022-02-23 ENCOUNTER — TELEPHONE (OUTPATIENT)
Dept: RHEUMATOLOGY | Facility: CLINIC | Age: 62
End: 2022-02-23
Payer: MEDICARE

## 2022-02-23 DIAGNOSIS — D84.9 IMMUNOSUPPRESSED STATUS: ICD-10-CM

## 2022-02-24 ENCOUNTER — PATIENT OUTREACH (OUTPATIENT)
Dept: ADMINISTRATIVE | Facility: HOSPITAL | Age: 62
End: 2022-02-24
Payer: MEDICARE

## 2022-02-24 ENCOUNTER — TELEPHONE (OUTPATIENT)
Dept: ADMINISTRATIVE | Facility: HOSPITAL | Age: 62
End: 2022-02-24
Payer: MEDICARE

## 2022-03-07 ENCOUNTER — TELEPHONE (OUTPATIENT)
Dept: PAIN MEDICINE | Facility: CLINIC | Age: 62
End: 2022-03-07
Payer: MEDICARE

## 2022-03-07 NOTE — TELEPHONE ENCOUNTER
"This message is for patient in regards to his/her appointment 3/8/22 at 10:40 am.      [facility] Policy: For the safety of all patients and staff members.     Patient Visitor policy: Due to social distancing and limited seating staff are requesting patient to arrive to their schedule appointments alone. If patient do not need assistance with their visit, we're asking all visitors to remain outside the waiting area.    Upon arriving to facility; patient will have temperature taking and are required to wear a face mask, if patient do not have a face mask one will be provided. Upon arriving patient we ask patients to contact clinic at this number [phone] to notify staff that they have arrived or they may do do by utilizing the Mobile checked in [place](if patient have patient portal; clinical staff will send a message through there letting them know it's okay to proceed to their visit). Staff will give the patient the "okay" to come up or wait inside their vehicle until clinic is ready for patient to be seen by Valarie Harman If you have any questions or concerns please contact [phone]        "

## 2022-03-08 ENCOUNTER — HOSPITAL ENCOUNTER (OUTPATIENT)
Dept: RADIOLOGY | Facility: CLINIC | Age: 62
Discharge: HOME | End: 2022-03-08
Attending: INTERNAL MEDICINE
Payer: MEDICARE

## 2022-03-08 ENCOUNTER — OFFICE VISIT (OUTPATIENT)
Dept: PAIN MEDICINE | Facility: CLINIC | Age: 62
End: 2022-03-08
Payer: MEDICARE

## 2022-03-08 ENCOUNTER — TELEPHONE (OUTPATIENT)
Dept: PAIN MEDICINE | Facility: OTHER | Age: 62
End: 2022-03-08
Payer: MEDICARE

## 2022-03-08 VITALS
WEIGHT: 151.88 LBS | DIASTOLIC BLOOD PRESSURE: 85 MMHG | RESPIRATION RATE: 18 BRPM | BODY MASS INDEX: 29.82 KG/M2 | HEIGHT: 60 IN | HEART RATE: 97 BPM | SYSTOLIC BLOOD PRESSURE: 145 MMHG

## 2022-03-08 DIAGNOSIS — M47.816 LUMBAR SPONDYLOSIS: Primary | ICD-10-CM

## 2022-03-08 DIAGNOSIS — T38.0X5A STEROID-INDUCED OSTEOPOROSIS: ICD-10-CM

## 2022-03-08 DIAGNOSIS — M47.819 SPONDYLOSIS WITHOUT MYELOPATHY: ICD-10-CM

## 2022-03-08 DIAGNOSIS — M51.36 DDD (DEGENERATIVE DISC DISEASE), LUMBAR: ICD-10-CM

## 2022-03-08 DIAGNOSIS — M54.16 LUMBAR RADICULOPATHY: ICD-10-CM

## 2022-03-08 DIAGNOSIS — G89.29 OTHER CHRONIC PAIN: ICD-10-CM

## 2022-03-08 DIAGNOSIS — G89.29 CHRONIC HIP PAIN, LEFT: ICD-10-CM

## 2022-03-08 DIAGNOSIS — M81.8 STEROID-INDUCED OSTEOPOROSIS: ICD-10-CM

## 2022-03-08 DIAGNOSIS — M25.552 CHRONIC HIP PAIN, LEFT: ICD-10-CM

## 2022-03-08 PROCEDURE — 1159F MED LIST DOCD IN RCRD: CPT | Mod: CPTII,S$GLB,, | Performed by: NURSE PRACTITIONER

## 2022-03-08 PROCEDURE — 99214 PR OFFICE/OUTPT VISIT, EST, LEVL IV, 30-39 MIN: ICD-10-PCS | Mod: S$GLB,,, | Performed by: NURSE PRACTITIONER

## 2022-03-08 PROCEDURE — 3077F SYST BP >= 140 MM HG: CPT | Mod: CPTII,S$GLB,, | Performed by: NURSE PRACTITIONER

## 2022-03-08 PROCEDURE — 1159F PR MEDICATION LIST DOCUMENTED IN MEDICAL RECORD: ICD-10-PCS | Mod: CPTII,S$GLB,, | Performed by: NURSE PRACTITIONER

## 2022-03-08 PROCEDURE — 99214 OFFICE O/P EST MOD 30 MIN: CPT | Mod: S$GLB,,, | Performed by: NURSE PRACTITIONER

## 2022-03-08 PROCEDURE — 3077F PR MOST RECENT SYSTOLIC BLOOD PRESSURE >= 140 MM HG: ICD-10-PCS | Mod: CPTII,S$GLB,, | Performed by: NURSE PRACTITIONER

## 2022-03-08 PROCEDURE — 1160F PR REVIEW ALL MEDS BY PRESCRIBER/CLIN PHARMACIST DOCUMENTED: ICD-10-PCS | Mod: CPTII,S$GLB,, | Performed by: NURSE PRACTITIONER

## 2022-03-08 PROCEDURE — 3008F PR BODY MASS INDEX (BMI) DOCUMENTED: ICD-10-PCS | Mod: CPTII,S$GLB,, | Performed by: NURSE PRACTITIONER

## 2022-03-08 PROCEDURE — 77080 DXA BONE DENSITY AXIAL: CPT | Mod: 26,,, | Performed by: INTERNAL MEDICINE

## 2022-03-08 PROCEDURE — 77080 DXA BONE DENSITY AXIAL: CPT | Mod: TC

## 2022-03-08 PROCEDURE — 3079F DIAST BP 80-89 MM HG: CPT | Mod: CPTII,S$GLB,, | Performed by: NURSE PRACTITIONER

## 2022-03-08 PROCEDURE — 1160F RVW MEDS BY RX/DR IN RCRD: CPT | Mod: CPTII,S$GLB,, | Performed by: NURSE PRACTITIONER

## 2022-03-08 PROCEDURE — 3008F BODY MASS INDEX DOCD: CPT | Mod: CPTII,S$GLB,, | Performed by: NURSE PRACTITIONER

## 2022-03-08 PROCEDURE — 99999 PR PBB SHADOW E&M-EST. PATIENT-LVL IV: CPT | Mod: PBBFAC,,, | Performed by: NURSE PRACTITIONER

## 2022-03-08 PROCEDURE — 77080 DEXA BONE DENSITY SPINE HIP: ICD-10-PCS | Mod: 26,,, | Performed by: INTERNAL MEDICINE

## 2022-03-08 PROCEDURE — 99999 PR PBB SHADOW E&M-EST. PATIENT-LVL IV: ICD-10-PCS | Mod: PBBFAC,,, | Performed by: NURSE PRACTITIONER

## 2022-03-08 PROCEDURE — 3079F PR MOST RECENT DIASTOLIC BLOOD PRESSURE 80-89 MM HG: ICD-10-PCS | Mod: CPTII,S$GLB,, | Performed by: NURSE PRACTITIONER

## 2022-03-08 RX ORDER — TRAMADOL HYDROCHLORIDE 50 MG/1
TABLET ORAL
Qty: 60 TABLET | Refills: 0 | Status: SHIPPED | OUTPATIENT
Start: 2022-03-08 | End: 2022-06-09 | Stop reason: SDUPTHER

## 2022-03-08 NOTE — PROGRESS NOTES
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 Bilateral 7/23/2021    Procedure: INJECTION, STEROID, EPIDURAL, TRANSFORAMINAL APPROACH L4/5;  Surgeon: Tj Mayfield MD;  Location: [facility];  Service: Pain Management;  Laterality: Bilateral;    TRIGGER POINT INJECTION N/A 7/23/2021    Procedure: INJECTION, TRIGGER POINT SCAPULAR;  Surgeon: Tj Mayfield MD;  Location: [facility];  Service: Pain Management;  Laterality: N/A;    TUBAL LIGATION  2003    UPPER GASTROINTESTINAL ENDOSCOPY      10/11    uterine ablation  2003       Family History:  Family History   Problem Relation Age of Onset    Cancer Mother         Lung Cancer    Emphysema Mother     Heart attack Mother     Alcohol abuse Mother     Cancer Father         Lung Cancer    Osteoarthritis Father     Lung cancer Father     Skin cancer Father     Alcohol abuse Father     Heart disease Brother     Heart attack Brother     Alcohol abuse Brother     Cirrhosis Brother     Osteoarthritis Paternal Aunt     Retinal detachment Maternal Aunt     No Known Problems Sister     No Known Problems Maternal Uncle     No Known Problems Paternal Uncle     No Known Problems Maternal Grandmother     No Known Problems Maternal Grandfather     No Known Problems Paternal Grandmother     No Known Problems Paternal Grandfather     Colon cancer Neg Hx     Esophageal cancer Neg Hx     Stomach cancer Neg Hx     Celiac disease Neg Hx      Diabetes Neg Hx     Thyroid disease Neg Hx     Amblyopia Neg Hx     Blindness Neg Hx     Cataracts Neg Hx     Glaucoma Neg Hx     Hypertension Neg Hx     Macular degeneration Neg Hx     Strabismus Neg Hx     Stroke Neg Hx        Social History:  Social History     Socioeconomic History    Marital status:    Tobacco Use    Smoking status: Never Smoker    Smokeless tobacco: Never Used   Substance and Sexual Activity    Alcohol use: Yes     Comment: 2-3 times per year.    Drug use: No    Sexual activity: Yes     Partners: Male     Birth control/protection: Surgical     Social Determinants of Health     Financial Resource Strain: Low Risk     Difficulty of Paying Living Expenses: Not hard at all   Food Insecurity: No Food Insecurity    Worried About Running Out of Food in the Last Year: Never true    Ran Out of Food in the Last Year: Never true   Transportation Needs: No Transportation Needs    Lack of Transportation (Medical): No    Lack of Transportation (Non-Medical): No   Physical Activity: Unknown    Days of Exercise per Week: 2 days   Stress: No Stress Concern Present    Feeling of Stress : Not at all   Social Connections: Unknown    Frequency of Communication with Friends and Family: More than three times a week    Frequency of Social Gatherings with Friends and Family: Twice a week    Active Member of Clubs or Organizations: Yes    Attends Club or Organization Meetings: 1 to 4 times per year    Marital Status:    Housing Stability: Low Risk     Unable to Pay for Housing in the Last Year: No    Number of Places Lived in the Last Year: 1    Unstable Housing in the Last Year: No       OBJECTIVE:    BP (!) 145/85 (BP Location: Left arm, Patient Position: Sitting, BP Method: Small (Automatic))   Pulse 97   Resp 18   Ht 5' (1.524 m)   Wt 68.9 kg (151 lb 14.4 oz)   LMP  (LMP Unknown)   BMI 29.67 kg/m²     PHYSICAL EXAMINATION:    General appearance: Well appearing, in  no acute distress, alert and oriented x3.  Psych:  Mood and affect appropriate.  Back: TTP over lumbar facet joints bilaterally. Mild TTP over lumbar paraspinal muscles on the left side. Limited ROM with pain on lumbar extension and flexion.  Positive facet loading bilaterally L>R. Pain on extension > flexion.  Negative SLR bilaterally.  MSK: 5/5 strength in right ankle with plantar and dorsiflexion. 5/5 strength in left ankle with plantar and dorsiflexion. 4/5 strength with right knee flexion and extension. 5/5 strength with left knee flexion and extension. 5/5 strength in right EHL, 5/5 strength in left EHL.  Neuro: No loss of sensation.  Kelsey is negative bilaterally.  Gait: Antalgic- ambulates without assistance.    ASSESSMENT: 61 y.o. year old female with right sided back and hip pain, consistent with the following diagnoses:     1. Lumbar spondylosis  Procedure Order to Pain Management    Procedure Order to Pain Management   2. Lumbar radiculopathy     3. Other chronic pain     4. Spondylosis without myelopathy     5. DDD (degenerative disc disease), lumbar           PLAN:     - Previous imaging was reviewed and discussed with the patient today.    - Patient doing well from recently procedures for muscular pain and bursa pain. Her symptoms today are most consistent with lumbar facet arthropathy. She had significant benefit with previous lumbar RFAs for about 5 months and wishes to repeat. Orders placed today.    - Can continue Tramadol 50 mg PRN. Refilled today.    - RTC 4 weeks after procedures.    - Counseled patient regarding the importance of constant sleeping habits and physical therapy.    - Dr. Mayfield was consulted on the patient and agrees with this plan.        The above plan and management options were discussed at length with patient. Patient is in agreement with the above and verbalized understanding.    Valarie Law  03/08/2022

## 2022-03-09 ENCOUNTER — TELEPHONE (OUTPATIENT)
Dept: PAIN MEDICINE | Facility: OTHER | Age: 62
End: 2022-03-09
Payer: MEDICARE

## 2022-03-09 ENCOUNTER — PATIENT MESSAGE (OUTPATIENT)
Dept: PAIN MEDICINE | Facility: OTHER | Age: 62
End: 2022-03-09
Payer: MEDICARE

## 2022-03-09 DIAGNOSIS — M47.816 LUMBAR SPONDYLOSIS: Primary | ICD-10-CM

## 2022-03-10 ENCOUNTER — TELEPHONE (OUTPATIENT)
Dept: ENDOCRINOLOGY | Facility: CLINIC | Age: 62
End: 2022-03-10
Payer: MEDICARE

## 2022-03-10 ENCOUNTER — PATIENT MESSAGE (OUTPATIENT)
Dept: ENDOCRINOLOGY | Facility: HOSPITAL | Age: 62
End: 2022-03-10
Payer: MEDICARE

## 2022-03-10 NOTE — TELEPHONE ENCOUNTER
----- Message from Nel Martinez sent at 3/10/2022  9:58 AM CST -----  Contact: pt  Pt requesting call back regarding test results        Confirmed patient's contact info below:  Contact Name: Joyce Peterson  Phone Number: [phone]

## 2022-03-11 ENCOUNTER — PATIENT MESSAGE (OUTPATIENT)
Dept: RHEUMATOLOGY | Facility: CLINIC | Age: 62
End: 2022-03-11
Payer: MEDICARE

## 2022-03-14 ENCOUNTER — PATIENT MESSAGE (OUTPATIENT)
Dept: RHEUMATOLOGY | Facility: CLINIC | Age: 62
End: 2022-03-14
Payer: MEDICARE

## 2022-03-14 ENCOUNTER — SPECIALTY PHARMACY (OUTPATIENT)
Dept: PHARMACY | Facility: CLINIC | Age: 62
End: 2022-03-14
Payer: MEDICARE

## 2022-03-14 DIAGNOSIS — M06.9 RHEUMATOID ARTHRITIS, INVOLVING UNSPECIFIED SITE, UNSPECIFIED WHETHER RHEUMATOID FACTOR PRESENT: ICD-10-CM

## 2022-03-14 RX ORDER — SARILUMAB 200 MG/1.14ML
200 INJECTION, SOLUTION SUBCUTANEOUS
Qty: 2.28 ML | Refills: 2 | Status: CANCELLED | OUTPATIENT
Start: 2022-03-14

## 2022-03-14 NOTE — TELEPHONE ENCOUNTER
Pt called for Kevzara refill.  Advised her that rx on file is out of refills.  Sent request to Dr Moran's office.   Pt needs refill by 3/21 to avoid missed doses.

## 2022-03-15 ENCOUNTER — PATIENT MESSAGE (OUTPATIENT)
Dept: RHEUMATOLOGY | Facility: CLINIC | Age: 62
End: 2022-03-15
Payer: MEDICARE

## 2022-03-15 RX ORDER — FLUCONAZOLE 100 MG/1
TABLET ORAL
Qty: 14 TABLET | Refills: 0 | Status: SHIPPED | OUTPATIENT
Start: 2022-03-15 | End: 2022-04-11 | Stop reason: SDUPTHER

## 2022-03-17 DIAGNOSIS — M06.9 RHEUMATOID ARTHRITIS, INVOLVING UNSPECIFIED SITE, UNSPECIFIED WHETHER RHEUMATOID FACTOR PRESENT: ICD-10-CM

## 2022-03-17 RX ORDER — SARILUMAB 200 MG/1.14ML
200 INJECTION, SOLUTION SUBCUTANEOUS
Qty: 2.28 ML | Refills: 2 | Status: SHIPPED | OUTPATIENT
Start: 2022-03-17 | End: 2022-06-08 | Stop reason: SDUPTHER

## 2022-03-18 ENCOUNTER — SPECIALTY PHARMACY (OUTPATIENT)
Dept: PHARMACY | Facility: CLINIC | Age: 62
End: 2022-03-18
Payer: MEDICARE

## 2022-03-18 DIAGNOSIS — M05.79 RHEUMATOID ARTHRITIS INVOLVING MULTIPLE SITES WITH POSITIVE RHEUMATOID FACTOR: Primary | ICD-10-CM

## 2022-03-18 NOTE — TELEPHONE ENCOUNTER
Specialty Pharmacy - Refill Coordination    Specialty Medication Orders Linked to Encounter    Flowsheet Row Most Recent Value   Medication #1 sarilumab (KEVZARA) 200 mg/1.14 mL Syrg (Order#[id number], Rx#)          Refill Questions - Documented Responses    Flowsheet Row Most Recent Value   Patient Availability and HIPAA Verification    Does patient want to proceed with activity? Yes   HIPAA/medical authority confirmed? Yes   Relationship to patient of person spoken to? Self   Refill Screening Questions    Changes to allergies? No   Changes to medications? No   New conditions since last clinic visit? No   Unplanned office visit, urgent care, ED, or hospital admission in the last 4 weeks? No   How does patient/caregiver feel medication is working? Good   Financial problems or insurance changes? No   How many doses of your specialty medications were missed in the last 4 weeks? 0   Would patient like to speak to a pharmacist? No   When does the patient need to receive the medication? 03/21/22   Refill Delivery Questions    How will the patient receive the medication? Delivery Jaymie   When does the patient need to receive the medication? 03/21/22   Shipping Address Home   Address in [facility] confirmed and updated if neccessary? Yes   Expected Copay ($) 55   Is the patient able to afford the medication copay? Yes   Payment Method CC on file   Days supply of Refill 28   Supplies needed? No supplies needed   Refill activity completed? Yes   Refill activity plan Refill scheduled   Shipment/Pickup Date: 03/21/22          Current Outpatient Medications   Medication Sig    albuterol (PROVENTIL/VENTOLIN HFA) 90 mcg/actuation inhaler INHALE 2 PUFFS INTO THE LUNGS EVERY 6 (SIX) HOURS AS NEEDED. RESCUE    albuterol-ipratropium (DUO-NEB) 2.5 mg-0.5 mg/3 mL nebulizer solution Take 3 mLs by nebulization every 6 (six) hours as needed for Wheezing. Rescue    aspirin 81 mg Tab Take 81 mg by mouth every morning.      budesonide-formoterol 160-4.5 mcg (SYMBICORT) 160-4.5 mcg/actuation HFAA INHALE 2 PUFFS INTO THE LUNGS EVERY 12 (TWELVE) HOURS.    calcium citrate-vitamin D3 315-200 mg (CITRACAL+D) 315 mg-5 mcg (200 unit) per tablet Take 1 tablet by mouth 2 (two) times daily.     conjugated estrogens (PREMARIN) vaginal cream insert 1 gram vaginally as directed    cyclobenzaprine (FLEXERIL) 5 MG tablet Take 1 tablet (5 mg total) by mouth nightly as needed for Muscle spasms. May take 1 additional 5mg tab nightly(total 10mg)  if needed    cycloSPORINE (RESTASIS) 0.05 % ophthalmic emulsion Instill one drop into both eyes two times per day    diclofenac sodium (VOLTAREN) 1 % Gel APPLY 2 GRAMS TOPICALLY 4 (FOUR) TIMES DAILY AS NEEDED.    estrogens, conjugated, (PREMARIN) 0.625 MG tablet take 1 tablet by mouth daily    fluconazole (DIFLUCAN) 100 MG tablet Take 1 tablet by mouth once daily for 14 days    halobetasol propion-tazarotene (DUOBRII) 0.01-0.045 % Lotn aaa on feet and hands qhs  then vaseline and warm towel    INV PROPULSID 10 MG Take 1 tablets (20 mg) by mouth 4 (four) times daily. FOR INVESTIGATIONAL USE ONLY. Protocol [id number]    ketoconazole (NIZORAL) 2 % cream Apply to feet twice daily for 3 weeks (Patient taking differently: Apply to feet twice daily for 3 weeks)    leflunomide (ARAVA) 20 MG Tab TAKE 1 TABLET BY MOUTH EVERY DAY    lifitegrast (XIIDRA) 5 % Dpet INSTILL ONE DROP INTO BOTH EYES TWICE A DAY (Patient taking differently: INSTILL ONE DROP INTO BOTH EYES TWICE A DAY)    methenamine (HIPREX) 1 gram Tab Take 1 tablet (1 g total) by mouth 2 (two) times daily.    mirabegron (MYRBETRIQ) 25 mg Tb24 ER tablet Take 1 tablet (25 mg total) by mouth once daily.    montelukast (SINGULAIR) 10 mg tablet TAKE 1 TABLET BY MOUTH EVERY DAY AT NIGHT    naproxen (NAPROSYN) 500 MG tablet Take 1 tablet (500 mg total) by mouth 2 (two) times daily as needed.    omeprazole (PRILOSEC) 40 MG capsule Take 1 capsule (40 mg  total) by mouth every morning.    omeprazole-sodium bicarbonate (ZEGERID) 40-1.1 mg-gram per capsule TAKE 1 CAPSULE BY MOUTH EVERY DAY IN THE MORNING    POTASSIUM ORAL Take by mouth once daily.    predniSONE (DELTASONE) 1 MG tablet Take 4 mg by mouth once daily.    predniSONE (DELTASONE) 5 MG tablet TAKE 1 TABLET BY MOUTH EVERY DAY    pregabalin (LYRICA) 50 MG capsule Take 1 capsule (50 mg total) by mouth every evening. May increase to 2 caps(100mg) nightly after 1 wk. If tolerated may increase to 3 caps(150mg) nightly after additional week    progesterone (PROMETRIUM) 100 MG capsule Take 1 capsule by mouth daily.    progesterone (PROMETRIUM) 100 MG capsule take 1 capsule by mouth daily    promethazine (PHENERGAN) 25 MG tablet Take 1 tablet (25 mg total) by mouth every 6 (six) hours as needed for Nausea.    rosuvastatin (CRESTOR) 20 MG tablet Take 1 tablet (20 mg total) by mouth every evening.    sarilumab (KEVZARA) 200 mg/1.14 mL Syrg Inject 1.14 mLs (200 mg total) into the skin every 14 (fourteen) days.    sucralfate (CARAFATE) 1 gram tablet Take 1 tablet (1 g total) by mouth 4 (four) times daily.    traMADoL (ULTRAM) 50 mg tablet TAKE 1 TABLET (50 MG TOTAL) BY MOUTH EVERY 12 (TWELVE) HOURS AS NEEDED FOR PAIN.   Last reviewed on 3/8/2022 11:01 AM by JAYME Rojo    Review of patient's allergies indicates:   Allergen Reactions    Actemra [tocilizumab] Other (See Comments)     Severe dizziness    Codeine Nausea And Vomiting    Gold au 198 Hives and Rash    Hydroxychloroquine Other (See Comments)     Can't remember the reaction      Iodinated contrast media Other (See Comments)     Other reaction(s): BURNING ALL OVER    Iodine Other (See Comments)     Other reaction(s): BURNING ALL OVER - Iodine dye - Not topical    Sulfa (sulfonamide antibiotics) Other (See Comments)     Can't remember the reaction    Zofran [ondansetron hcl (pf)] Nausea And Vomiting     Pt reports that last time  "she received zofran she started vomiting again    Methotrexate analogues Nausea Only    Pneumovax 23 [pneumococcal 23-argenis ps vaccine] Other (See Comments)     "sick"    Last reviewed on  3/8/2022 11:01 AM by Valarie Harman      Tasks added this encounter   4/11/2022 - Refill Call (Auto Added)   Tasks due within next 3 months   No tasks due.     [facility]  [address]  [facility] [address]  Phone: [phone]  Fax: [phone]      "

## 2022-03-22 ENCOUNTER — OFFICE VISIT (OUTPATIENT)
Dept: FAMILY MEDICINE | Facility: CLINIC | Age: 62
End: 2022-03-22
Payer: MEDICARE

## 2022-03-22 ENCOUNTER — TELEPHONE (OUTPATIENT)
Dept: FAMILY MEDICINE | Facility: CLINIC | Age: 62
End: 2022-03-22
Payer: MEDICARE

## 2022-03-22 ENCOUNTER — TELEPHONE (OUTPATIENT)
Dept: OTOLARYNGOLOGY | Facility: CLINIC | Age: 62
End: 2022-03-22
Payer: MEDICARE

## 2022-03-22 VITALS
TEMPERATURE: 98 F | DIASTOLIC BLOOD PRESSURE: 80 MMHG | HEART RATE: 94 BPM | OXYGEN SATURATION: 95 % | SYSTOLIC BLOOD PRESSURE: 128 MMHG

## 2022-03-22 DIAGNOSIS — H01.005 BLEPHARITIS OF LEFT LOWER EYELID, UNSPECIFIED TYPE: ICD-10-CM

## 2022-03-22 DIAGNOSIS — J01.40 ACUTE NON-RECURRENT PANSINUSITIS: Primary | ICD-10-CM

## 2022-03-22 PROCEDURE — 3079F DIAST BP 80-89 MM HG: CPT | Mod: CPTII,S$GLB,, | Performed by: NURSE PRACTITIONER

## 2022-03-22 PROCEDURE — 3079F PR MOST RECENT DIASTOLIC BLOOD PRESSURE 80-89 MM HG: ICD-10-PCS | Mod: CPTII,S$GLB,, | Performed by: NURSE PRACTITIONER

## 2022-03-22 PROCEDURE — 99213 OFFICE O/P EST LOW 20 MIN: CPT | Mod: S$GLB,,, | Performed by: NURSE PRACTITIONER

## 2022-03-22 PROCEDURE — 99999 PR PBB SHADOW E&M-EST. PATIENT-LVL V: CPT | Mod: PBBFAC,,, | Performed by: NURSE PRACTITIONER

## 2022-03-22 PROCEDURE — 3044F PR MOST RECENT HEMOGLOBIN A1C LEVEL <7.0%: ICD-10-PCS | Mod: CPTII,S$GLB,, | Performed by: NURSE PRACTITIONER

## 2022-03-22 PROCEDURE — 1159F PR MEDICATION LIST DOCUMENTED IN MEDICAL RECORD: ICD-10-PCS | Mod: CPTII,S$GLB,, | Performed by: NURSE PRACTITIONER

## 2022-03-22 PROCEDURE — 99999 PR PBB SHADOW E&M-EST. PATIENT-LVL V: ICD-10-PCS | Mod: PBBFAC,,, | Performed by: NURSE PRACTITIONER

## 2022-03-22 PROCEDURE — 1160F PR REVIEW ALL MEDS BY PRESCRIBER/CLIN PHARMACIST DOCUMENTED: ICD-10-PCS | Mod: CPTII,S$GLB,, | Performed by: NURSE PRACTITIONER

## 2022-03-22 PROCEDURE — 3074F SYST BP LT 130 MM HG: CPT | Mod: CPTII,S$GLB,, | Performed by: NURSE PRACTITIONER

## 2022-03-22 PROCEDURE — 1160F RVW MEDS BY RX/DR IN RCRD: CPT | Mod: CPTII,S$GLB,, | Performed by: NURSE PRACTITIONER

## 2022-03-22 PROCEDURE — 3074F PR MOST RECENT SYSTOLIC BLOOD PRESSURE < 130 MM HG: ICD-10-PCS | Mod: CPTII,S$GLB,, | Performed by: NURSE PRACTITIONER

## 2022-03-22 PROCEDURE — 99213 PR OFFICE/OUTPT VISIT, EST, LEVL III, 20-29 MIN: ICD-10-PCS | Mod: S$GLB,,, | Performed by: NURSE PRACTITIONER

## 2022-03-22 PROCEDURE — 3044F HG A1C LEVEL LT 7.0%: CPT | Mod: CPTII,S$GLB,, | Performed by: NURSE PRACTITIONER

## 2022-03-22 PROCEDURE — 1159F MED LIST DOCD IN RCRD: CPT | Mod: CPTII,S$GLB,, | Performed by: NURSE PRACTITIONER

## 2022-03-22 RX ORDER — ERYTHROMYCIN 5 MG/G
OINTMENT OPHTHALMIC 2 TIMES DAILY
Qty: 3.5 G | Refills: 0 | Status: SHIPPED | OUTPATIENT
Start: 2022-03-22 | End: 2022-05-26 | Stop reason: ALTCHOICE

## 2022-03-22 RX ORDER — DOXYCYCLINE 100 MG/1
100 CAPSULE ORAL 2 TIMES DAILY
Qty: 14 CAPSULE | Refills: 0 | Status: SHIPPED | OUTPATIENT
Start: 2022-03-22 | End: 2022-03-29

## 2022-03-22 NOTE — TELEPHONE ENCOUNTER
Called and spoke with pt in regards of her message. Pt states that she has a sore throat, cough, and congestion and ear pain. Was able to make patient an appt today to see NP Katie Lombardo. For 9:30 am today.

## 2022-03-22 NOTE — TELEPHONE ENCOUNTER
Spoke with patient to schedule an audio appointment with her ENT appointment this afternoon and she said she no longer needs to be seen because she saw a NP this morning.  I canceled the appointments.

## 2022-03-22 NOTE — PATIENT INSTRUCTIONS
Take antibiotics as directed  Continue probiotics  Use antibiotic ointment to affected eye as directed  Follow up if symptoms continue or worsen

## 2022-03-22 NOTE — TELEPHONE ENCOUNTER
----- Message from Avi Jaquez sent at 3/22/2022  7:24 AM CDT -----  Type:  Same Day Appointment Request    Caller is requesting a same day appointment.  Caller declined first available appointment listed below.    Name of Caller:sinus infection  When is the first available appointment?  Symptoms:sore throat, ear ache wheezing, congestion  How long has patient had these symptoms:  3 days    Best Call Back Number:[phone]  Additional Information: none

## 2022-03-22 NOTE — PROGRESS NOTES
Subjective:      Patient ID: Joyce Peterson is a 61 y.o. female.    Chief Complaint: Congestion, ear pain, sore throat    Reports nasal congestion that started about 2 weeks ago. Worsening congestion with right ear pain and sore throat over the past 3-4 days. Increasing cough and wheezing today, using inhaler with good relief. Taking Mucinex with little relief of congestion. Noticed left lower eyelid swelling over past 2 days. In past has treated with antibiotic ointment prescribed by ophthalmology but unable to find it at this time.     Review of Systems   Constitutional: Positive for fatigue. Negative for chills and fever.   HENT: Positive for congestion, ear pain, postnasal drip, rhinorrhea, sinus pressure, sinus pain and sore throat. Negative for ear discharge.    Eyes: Positive for itching. Negative for pain, discharge and visual disturbance.   Respiratory: Positive for cough, shortness of breath and wheezing. Negative for chest tightness.         Objective:     Vitals:    03/22/22 0933   BP: 128/80   Pulse: 94   Temp: 97.7 °F (36.5 °C)      Physical Exam  Vitals reviewed.   Constitutional:       General: She is not in acute distress.     Appearance: Normal appearance.   HENT:      Right Ear: A middle ear effusion is present.      Left Ear:  No middle ear effusion.      Nose: Rhinorrhea present.      Right Sinus: Maxillary sinus tenderness and frontal sinus tenderness present.      Left Sinus: Maxillary sinus tenderness and frontal sinus tenderness present.      Mouth/Throat:      Pharynx: No oropharyngeal exudate.   Eyes:      General:         Right eye: No discharge.         Left eye: No discharge.      Conjunctiva/sclera:      Left eye: No exudate.    Cardiovascular:      Rate and Rhythm: Normal rate and regular rhythm.      Heart sounds: Normal heart sounds.   Pulmonary:      Effort: Pulmonary effort is normal. No respiratory distress.      Breath sounds: Normal breath sounds.   Neurological:       Mental Status: She is alert.        Assessment:         1. Acute non-recurrent pansinusitis    2. Blepharitis of left lower eyelid, unspecified type          Plan:   1. Acute non-recurrent pansinusitis  - doxycycline (MONODOX) 100 MG capsule; Take 1 capsule (100 mg total) by mouth 2 (two) times daily. for 7 days  Dispense: 14 capsule; Refill: 0    2. Blepharitis of left lower eyelid, unspecified type  - erythromycin (ROMYCIN) ophthalmic ointment; Place into the left eye 2 (two) times daily. Apply to affected external eye tissue  Dispense: 3.5 g; Refill: 0     Continue Mucinex. Start antibiotics as prescribed. Apply antibiotic ointment as directed. Follow up for continued or worsening symptoms.    Katie Lombardo   [facility]   3/22/22

## 2022-03-23 ENCOUNTER — PROCEDURE VISIT (OUTPATIENT)
Dept: HEPATOLOGY | Facility: CLINIC | Age: 62
End: 2022-03-23
Payer: MEDICARE

## 2022-03-23 ENCOUNTER — OFFICE VISIT (OUTPATIENT)
Dept: RHEUMATOLOGY | Facility: CLINIC | Age: 62
End: 2022-03-23
Payer: MEDICARE

## 2022-03-23 ENCOUNTER — TELEPHONE (OUTPATIENT)
Dept: HEPATOLOGY | Facility: CLINIC | Age: 62
End: 2022-03-23

## 2022-03-23 ENCOUNTER — PATIENT MESSAGE (OUTPATIENT)
Dept: HEPATOLOGY | Facility: CLINIC | Age: 62
End: 2022-03-23

## 2022-03-23 VITALS
DIASTOLIC BLOOD PRESSURE: 86 MMHG | HEIGHT: 60 IN | SYSTOLIC BLOOD PRESSURE: 154 MMHG | HEART RATE: 107 BPM | BODY MASS INDEX: 30.23 KG/M2 | WEIGHT: 154 LBS

## 2022-03-23 DIAGNOSIS — R74.01 TRANSAMINASEMIA: Primary | ICD-10-CM

## 2022-03-23 DIAGNOSIS — M79.7 FIBROMYALGIA: ICD-10-CM

## 2022-03-23 PROCEDURE — 3079F DIAST BP 80-89 MM HG: CPT | Mod: CPTII,S$GLB,, | Performed by: INTERNAL MEDICINE

## 2022-03-23 PROCEDURE — 99213 PR OFFICE/OUTPT VISIT, EST, LEVL III, 20-29 MIN: ICD-10-PCS | Mod: S$GLB,,, | Performed by: INTERNAL MEDICINE

## 2022-03-23 PROCEDURE — 1159F PR MEDICATION LIST DOCUMENTED IN MEDICAL RECORD: ICD-10-PCS | Mod: CPTII,S$GLB,, | Performed by: INTERNAL MEDICINE

## 2022-03-23 PROCEDURE — 99999 PR PBB SHADOW E&M-EST. PATIENT-LVL V: CPT | Mod: PBBFAC,,, | Performed by: INTERNAL MEDICINE

## 2022-03-23 PROCEDURE — 3077F PR MOST RECENT SYSTOLIC BLOOD PRESSURE >= 140 MM HG: ICD-10-PCS | Mod: CPTII,S$GLB,, | Performed by: INTERNAL MEDICINE

## 2022-03-23 PROCEDURE — 3044F PR MOST RECENT HEMOGLOBIN A1C LEVEL <7.0%: ICD-10-PCS | Mod: CPTII,S$GLB,, | Performed by: INTERNAL MEDICINE

## 2022-03-23 PROCEDURE — 3077F SYST BP >= 140 MM HG: CPT | Mod: CPTII,S$GLB,, | Performed by: INTERNAL MEDICINE

## 2022-03-23 PROCEDURE — 3044F HG A1C LEVEL LT 7.0%: CPT | Mod: CPTII,S$GLB,, | Performed by: INTERNAL MEDICINE

## 2022-03-23 PROCEDURE — 91200 FIBROSCAN (VIBRATION CONTROLLED TRANSIENT ELASTOGRAPHY): ICD-10-PCS | Mod: S$GLB,,, | Performed by: PHYSICIAN ASSISTANT

## 2022-03-23 PROCEDURE — 99999 PR PBB SHADOW E&M-EST. PATIENT-LVL V: ICD-10-PCS | Mod: PBBFAC,,, | Performed by: INTERNAL MEDICINE

## 2022-03-23 PROCEDURE — 3008F PR BODY MASS INDEX (BMI) DOCUMENTED: ICD-10-PCS | Mod: CPTII,S$GLB,, | Performed by: INTERNAL MEDICINE

## 2022-03-23 PROCEDURE — 3008F BODY MASS INDEX DOCD: CPT | Mod: CPTII,S$GLB,, | Performed by: INTERNAL MEDICINE

## 2022-03-23 PROCEDURE — 3079F PR MOST RECENT DIASTOLIC BLOOD PRESSURE 80-89 MM HG: ICD-10-PCS | Mod: CPTII,S$GLB,, | Performed by: INTERNAL MEDICINE

## 2022-03-23 PROCEDURE — 99213 OFFICE O/P EST LOW 20 MIN: CPT | Mod: S$GLB,,, | Performed by: INTERNAL MEDICINE

## 2022-03-23 PROCEDURE — 1159F MED LIST DOCD IN RCRD: CPT | Mod: CPTII,S$GLB,, | Performed by: INTERNAL MEDICINE

## 2022-03-23 PROCEDURE — 91200 LIVER ELASTOGRAPHY: CPT | Mod: S$GLB,,, | Performed by: PHYSICIAN ASSISTANT

## 2022-03-23 RX ORDER — PREGABALIN 50 MG/1
50 CAPSULE ORAL NIGHTLY
Qty: 90 CAPSULE | Refills: 1 | Status: SHIPPED | OUTPATIENT
Start: 2022-03-23 | End: 2022-06-23 | Stop reason: SDUPTHER

## 2022-03-23 NOTE — PROCEDURES
FibroScan (Vibration Controlled Transient Elastography)    Date/Time: 3/23/2022 8:15 AM  Performed by: Fang Jasso PA-C  Authorized by: Candida Walls NP     Diagnosis:  NAFLD, Cryptogenic and Cholestatic    Probe:  M    Universal Protocol: Patient's identity, procedure and site were verified, confirmatory pause was performed.  Discussed procedure including risks and potential complications.  Questions answered.  Patient verbalizes understanding and wishes to proceed with VCTE.     Procedure: After providing explanations of the procedure, patient was placed in the supine position with right arm in maximum abduction to allow optimal exposure of right lateral abdomen.  Patient was briefly assessed, Testing was performed in the mid-axillary location, 50Hz Shear Wave pulses were applied and the resulting Shear Wave and Propagation Speed detected with a 3.5 MHz ultrasonic signal, using the FibroScan probe, Skin to liver capsule distance and liver parenchyma were accessed during the entire examination with the FibroScan probe, Patient was instructed to breathe normally and to abstain from sudden movements during the procedure, allowing for random measurements of liver stiffness. At least 10 Shear Waves were produced, Individual measurements of each Shear Wave were calculated.  Patient tolerated the procedure well with no complications.  Meets discharge criteria as was dismissed.  Rates pain 0 out of 10.  Patient will follow up with ordering provider to review results.      Findings  Median liver stiffness score:  3.1  CAP Reading: dB/m:  266    IQR/med %:  13  Interpretation  Fibrosis interpretation is based on medial liver stiffness - Kilopascal (kPa).    Fibrosis Stage:  F 0-1  Steatosis interpretation is based on controlled attenuation parameter - (dB/m).    Steatosis Grade:  S2

## 2022-03-23 NOTE — PATIENT INSTRUCTIONS
- 2nd Pfizer Covid Booster (4th shot); hold leflunomide for 1 week after injection    - Continue Leflunomide, Prednisone & Kevzara    - Continue Prolia, next infusion 5/12/22    - Continue 1200 mg dietary calcium daily    - Return to clinic in 3 months with standing labs.

## 2022-03-23 NOTE — PROGRESS NOTES
Subjective:       Patient ID: Joyce Peterson is a 61 y.o. female.    Chief Complaint: RA; secondary OA; fibromyalgia; osteoporosis    60 yo female presenting for follow-up of RA, fibromyalgia and osteoporosis. She reports her RA has been well controlled over the past three months on sarilumab 200 mg sc q 2 wks, leflunomide 20mg daily and prednisone  5 mg daily. She does report she has had an URI over the past few days for which her PCP started her on abx yesterday and she reports her symptoms are improving. She has b/l L3-5 RFA's upcoming in earlier April. She reports that if her back pain was ariela she would have no complaints. She rides her stationary bike or does yoga 3-4x weekly. She reports she is tolerating Lyrica well and that she feels it has helped her fibromyalgia symptoms. Less tenderness/fatigue overall.     Review of Systems   Constitutional: Negative for fever and unexpected weight change.   HENT: Positive for postnasal drip and sinus pressure. Negative for mouth sores and trouble swallowing.    Eyes: Negative for redness and visual disturbance.   Respiratory: Negative for cough and shortness of breath.    Cardiovascular: Negative for chest pain and leg swelling.   Gastrointestinal: Negative for anal bleeding, constipation, diarrhea and vomiting.   Genitourinary: Negative for dysuria, genital sores and hematuria.   Skin: Negative for rash.   Neurological: Negative for headaches.   Hematological: Does not bruise/bleed easily.   Psychiatric/Behavioral: Negative.          Objective:   BP (!) 154/86   Pulse 107   Ht 5' (1.524 m)   Wt 69.9 kg (154 lb)   LMP  (LMP Unknown)   BMI 30.08 kg/m²          Physical Exam   Constitutional: She is oriented to person, place, and time. No distress.   HENT:   Head: Normocephalic and atraumatic.   Eyes: Pupils are equal, round, and reactive to light. Conjunctivae are normal.   Cardiovascular: Normal rate, regular rhythm, normal heart sounds and normal pulses.  Exam reveals no friction rub.   Pulmonary/Chest: Effort normal and breath sounds normal. No respiratory distress.   Abdominal: Soft. Normal appearance. She exhibits no distension. There is no abdominal tenderness.   Musculoskeletal:      Right shoulder: Tenderness present.      Left shoulder: Tenderness present.      Cervical back: Normal range of motion. No tenderness.   Neurological: She is alert and oriented to person, place, and time.   Skin: No rash noted. No erythema.   Psychiatric: Her behavior is normal. Mood and thought content normal.       Right Side Rheumatological Exam   Examination finds the 1st PIP, 2nd PIP, 4th PIP, 4th MCP, 5th PIP and 5th MCP normal.  The patient has an enlarged wrist, 1st CMC, 1st MCP, 2nd MCP and 3rd MCP    Shoulder Exam   The patient is tender to palpation of the shoulder.  Tenderness Location: biceps tendon and posterior shoulder  Range of Motion   The patient has normal right shoulder ROM.    Muscle Strength (0-5 scale):  Deltoid:  5  Biceps: 5/5   Triceps:  5  : 5/5   Iliopsoas: 5  Quadriceps:  5   Distal Lower Extremity: 5    Left Side Rheumatological Exam   Examination finds the 1st PIP, 2nd PIP, 3rd PIP, 4th PIP, 4th MCP, 5th PIP and 5th MCP normal  The patient has an enlarged wrist, 1st CMC, 1st MCP, 2nd MCP and 3rd MCP.    Shoulder Exam   The patient is tender to palpation of the shoulder.  Tenderness Location: biceps tendon and posterior shoulder  Range of Motion   The patient has normal left shoulder ROM.    Muscle Strength (0-5 scale):  Deltoid:  5  Biceps: 5/5   Triceps:  5  :  5/5   Iliopsoas: 5  Quadriceps:  5   Distal Lower Extremity: 5      Component      Latest Ref Rng & Units 3/15/2022   WBC      3.90 - 12.70 K/uL 5.19   RBC      4.00 - 5.40 M/uL 4.63   Hemoglobin      12.0 - 16.0 g/dL 13.9   Hematocrit      37.0 - 48.5 % 42.6   MCV      82 - 98 fL 92   MCH      27.0 - 31.0 pg 30.0   MCHC      32.0 - 36.0 g/dL 32.6   RDW      11.5 - 14.5 % 13.4    Platelets      150 - 450 K/uL 216   MPV      9.2 - 12.9 fL 11.4   Immature Granulocytes      0.0 - 0.5 % 0.2   Gran # (ANC)      1.8 - 7.7 K/uL 3.0   Immature Grans (Abs)      0.00 - 0.04 K/uL 0.01   Lymph #      1.0 - 4.8 K/uL 1.3   Mono #      0.3 - 1.0 K/uL 0.8   Eos #      0.0 - 0.5 K/uL 0.0   Baso #      0.00 - 0.20 K/uL 0.07   nRBC      0 /100 WBC 0   Gran %      38.0 - 73.0 % 57.1   Lymph %      18.0 - 48.0 % 25.4   Mono %      4.0 - 15.0 % 15.6 (H)   Eosinophil %      0.0 - 8.0 % 0.4   Basophil %      0.0 - 1.9 % 1.3   Differential Method       Automated   Sodium      136 - 145 mmol/L 139   Potassium      3.5 - 5.1 mmol/L 4.1   Chloride      95 - 110 mmol/L 108   CO2      23 - 29 mmol/L 23   Glucose      70 - 110 mg/dL 99   BUN      7 - 17 mg/dL 14   Creatinine      0.50 - 1.40 mg/dL 0.79   Calcium      8.7 - 10.5 mg/dL 8.7   PROTEIN TOTAL      6.0 - 8.4 g/dL 6.6   Albumin      3.5 - 5.2 g/dL 4.2   BILIRUBIN TOTAL      0.1 - 1.0 mg/dL 0.5   Alkaline Phosphatase      38 - 126 U/L 66   AST      15 - 46 U/L 43   ALT      10 - 44 U/L 34   Anion Gap      8 - 16 mmol/L 8   eGFR if African American      >60 mL/min/1.73 m:2 >60.0   eGFR if non African American      >60 mL/min/1.73 m:2 >60.0   Hemoglobin A1C External      4.0 - 5.6 % 5.2   Estimated Avg Glucose      68 - 131 mg/dL 103   Sed Rate      0 - 20 mm/Hr 2   CRP      0.0 - 8.2 mg/L <0.3       Assessment/Plan   62 yo female with RA - well controlled on current regimen; secondary OA - for which she sees pain management; fibromyalgia - well controlled with lifestyle modifications and Lyrica; & osteoporosis - improved with 2 years of teriparatide and now on denosumab 60 mg sc q 6 months.    - 2nd Pfizer Covid Booster (4th shot); hold leflunomide for 1 week after injection  - Continue sarilumab 200 mg sc q 2 wks  - Continue leflunomide 20mg daily  - Continue prednisone  5 mg daily  - Continue Cyclobenzaprine  5mg prn only  - Continue denosumab 60 mg sc q 6  months, next infusion 5/12/22  - Continue Lyrica 50 mg nightly   - Continue 1200 mg dietary calcium daily  - Continue yoga on You Tube  - Continue Aerobic exercises  - Return to clinic in 3 months with standing labs.     Jj Sanchez MD  [facility] PM&R PGY1  Pt was seen and discussed with Dr. Moran who is in agreement with the plan.

## 2022-03-23 NOTE — PROGRESS NOTES
I have personally taken the history and examined the patient and agree with the resident,s note as stated above           erosive RA discontinued long term methotrexate b/o nausea with both po and sc and reduced dose, allergic to sulfa, and had reaction to hydroxychloroquine;  Has failed 3 TNFi(infliximab, etanercept, adalimumab), abatacept, hypogamma with rituximab and gets frequent infections in any event, acute vertigo/dizziness with tocilizumab x2  Failed tofacitinib  Currently on Sarilumab     Future option would be upadacitinib               EXAMINATION:  DEXA BONE DENSITY SPINE HIP     CLINICAL HISTORY:  Steroid induced osteoporosis.  62 y/o female with history of multiple fractures related to long-term use of corticosteroids.  She recently completed a 2 year course of Forteo combined with Prolia.  She is taking calcium and vitamin-D and remains on Prolia.  She has a history of rheumatoid arthritis.     TECHNIQUE:  DXA specification: [place] [employer] A ([id number])     Bone Mineral Density scanning was performed over the hip and lumbar spine.     Review of the images confirms satisfactory positioning and technique.     COMPARISON:  Comparison study done on 03/17/2021.     Lumbar spine:    BMD 0.909 g/cm2 and T-score -1.3.     Total Hip:           BMD 0.904 g/cm2 and T-score -0.3.     FINDINGS:  Lumbar spine (L1-L4):              BMD is 0.993 g/cm2, T-score is -0.5, and Z-score is 1.0.     Degenerative changes at the lumbar spine are noted, which may cause increased bone density in this region.     Total hip:                                BMD is 0.902 g/cm2, T-score is -0.3, and Z-score is 0.7.     Femoral neck:                          BMD is 0.768 g/cm2, T-score is -0.7, and Z-score is 0.6.     FRAX:     23% risk of a major osteoporotic fracture in the next 10 years.     1.6% risk of hip fracture in the next 10 years.     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     RECOMMENDATIONS:  *Daily calcium intake 7678-9075 mg, dietary sources preferred; Vitamin D 4028-2551 IU daily.  *Weight bearing exercise and fall precautions.  *Recommend continued therapy with Prolia every 6 months. If Prolia is discontinued alternative anti-resorptive therapy should be started to prevent rapid bone loss associated with Prolia withdrawal.  *Repeat BMD in 1 year        RA:   TJ 28  SJ 0  Pt global 90  ESR 0  CRP <0.3 DAS28 4(MDA)  [id number] 5.06(MDA)  CDAI 33(HDA) but all driven by low back pain, neck pain, fibromyalgia  Rheumatoid Arthritis Treatment Goals:  -Disease Activity Measure:CDAI   -Target: LDA  -Patient Goal:  -Therapy/Change: none, activity scores driven by back symptoms, neck symptoms, fibromyalgia not peripheral joints  -Shared Decision Making: Discussed goals of care and therapy plan together with patient as outlined above.        Osteoporosis: will be completing 2 years of teriparatide  Cont denosumab 60mg sc q  6months(since 2017); teriparatide 1/2020 -1/2022  Saw Dr. Lane Endo 1/5/22: cont denosumab, could potentially resume teriparatide to switch to romosozumab if fractures on denosumab.  Fibromyalgia: intolerant of duloxetine, gabapentin. pregabalin 50mg nightlySubclinical hyperthyroidism, thyroid nodule: Dr. Lane  Lumbar spondylosis,  to have RFA 4/4/22 and 4/18/22 Dr Mayfield         *4th dose(2nd booster) of Pfizer Covid vaccine yesterday hold leflunomide for 1-2 wks then resume  sarilumab 200 mg sc q 2 wks  leflunomide 20mg daily  prednisone  5 mg daily  pregabalin 50mg nightly, sleeping better spirits better   yoga on You Tube  Aerobic exercises stationary bike  F/u Pain Management for RFA   denosumab 60 mg sc q 6 months  1200 mg dietary calcium daily  . RTC 3 months with standing labs

## 2022-03-23 NOTE — TELEPHONE ENCOUNTER
----- Message from Candida Walls NP sent at 3/23/2022  1:36 PM CDT -----  Please set recall for RTC in 2 years. Thanks!

## 2022-03-24 ENCOUNTER — PATIENT MESSAGE (OUTPATIENT)
Dept: FAMILY MEDICINE | Facility: CLINIC | Age: 62
End: 2022-03-24
Payer: MEDICARE

## 2022-03-24 ENCOUNTER — TELEPHONE (OUTPATIENT)
Dept: FAMILY MEDICINE | Facility: CLINIC | Age: 62
End: 2022-03-24
Payer: MEDICARE

## 2022-03-24 ENCOUNTER — PATIENT MESSAGE (OUTPATIENT)
Dept: HEPATOLOGY | Facility: CLINIC | Age: 62
End: 2022-03-24
Payer: MEDICARE

## 2022-03-24 NOTE — TELEPHONE ENCOUNTER
Called and spoke with pt in regards of her message. Pt saw TARA Wilson on Tuesday. Informed pt that she is only here on Tuesdays, pt verbalized understanding. Pt states that she still is having symptoms, and the congestion has moved to her chest now. Patient would like some help. Please advise patiently message.     Patient did send my chart message early did send to TARA Wilson as well.

## 2022-03-24 NOTE — TELEPHONE ENCOUNTER
Please call to ask how much prednisone is she taking? If still staking her 5mg then she should increase to 20mg for 5 days then 10mg 5 days then back to regular 5 once feeling improved.

## 2022-03-24 NOTE — TELEPHONE ENCOUNTER
----- Message from Radha Ochoa sent at 3/24/2022  4:37 PM CDT -----  Regarding: Pt Advice  Type:  Needs Medical Advice    Who Called: pt    Symptoms (please be specific): antibiotics aren't helping     How long has patient had these symptoms:  2 weeks    Pharmacy name and phone #:  [id number] - [place] - [address] [facility]  [address] [place] [address]  Phone: [phone] Fax: [phone]    Would the patient rather a call back or a response via [facility]? Call    Best Call Back Number: [phone]

## 2022-03-29 ENCOUNTER — OFFICE VISIT (OUTPATIENT)
Dept: OPTOMETRY | Facility: CLINIC | Age: 62
End: 2022-03-29
Payer: MEDICARE

## 2022-03-29 ENCOUNTER — PATIENT MESSAGE (OUTPATIENT)
Dept: OPTOMETRY | Facility: CLINIC | Age: 62
End: 2022-03-29

## 2022-03-29 ENCOUNTER — OFFICE VISIT (OUTPATIENT)
Dept: FAMILY MEDICINE | Facility: CLINIC | Age: 62
End: 2022-03-29
Payer: MEDICARE

## 2022-03-29 VITALS
WEIGHT: 154.56 LBS | OXYGEN SATURATION: 98 % | SYSTOLIC BLOOD PRESSURE: 126 MMHG | TEMPERATURE: 98 F | BODY MASS INDEX: 30.35 KG/M2 | DIASTOLIC BLOOD PRESSURE: 84 MMHG | HEIGHT: 60 IN | HEART RATE: 94 BPM

## 2022-03-29 DIAGNOSIS — H04.123 BILATERAL DRY EYES: ICD-10-CM

## 2022-03-29 DIAGNOSIS — J45.41 MODERATE PERSISTENT ASTHMA WITH ACUTE EXACERBATION: Primary | ICD-10-CM

## 2022-03-29 DIAGNOSIS — J40 BRONCHITIS: ICD-10-CM

## 2022-03-29 DIAGNOSIS — H57.89 IRRITATION OF BOTH EYES: Primary | ICD-10-CM

## 2022-03-29 DIAGNOSIS — H25.13 NUCLEAR SCLEROSIS OF BOTH EYES: ICD-10-CM

## 2022-03-29 PROCEDURE — 3079F PR MOST RECENT DIASTOLIC BLOOD PRESSURE 80-89 MM HG: ICD-10-PCS | Mod: CPTII,S$GLB,, | Performed by: STUDENT IN AN ORGANIZED HEALTH CARE EDUCATION/TRAINING PROGRAM

## 2022-03-29 PROCEDURE — 99999 PR PBB SHADOW E&M-EST. PATIENT-LVL V: ICD-10-PCS | Mod: PBBFAC,,, | Performed by: STUDENT IN AN ORGANIZED HEALTH CARE EDUCATION/TRAINING PROGRAM

## 2022-03-29 PROCEDURE — 1160F PR REVIEW ALL MEDS BY PRESCRIBER/CLIN PHARMACIST DOCUMENTED: ICD-10-PCS | Mod: CPTII,S$GLB,, | Performed by: STUDENT IN AN ORGANIZED HEALTH CARE EDUCATION/TRAINING PROGRAM

## 2022-03-29 PROCEDURE — 3044F PR MOST RECENT HEMOGLOBIN A1C LEVEL <7.0%: ICD-10-PCS | Mod: CPTII,S$GLB,, | Performed by: OPTOMETRIST

## 2022-03-29 PROCEDURE — 99999 PR PBB SHADOW E&M-EST. PATIENT-LVL V: CPT | Mod: PBBFAC,,, | Performed by: STUDENT IN AN ORGANIZED HEALTH CARE EDUCATION/TRAINING PROGRAM

## 2022-03-29 PROCEDURE — 99999 PR PBB SHADOW E&M-EST. PATIENT-LVL IV: ICD-10-PCS | Mod: PBBFAC,,, | Performed by: OPTOMETRIST

## 2022-03-29 PROCEDURE — 1160F RVW MEDS BY RX/DR IN RCRD: CPT | Mod: CPTII,S$GLB,, | Performed by: STUDENT IN AN ORGANIZED HEALTH CARE EDUCATION/TRAINING PROGRAM

## 2022-03-29 PROCEDURE — 96372 PR INJECTION,THERAP/PROPH/DIAG2ST, IM OR SUBCUT: ICD-10-PCS | Mod: 59,S$GLB,, | Performed by: STUDENT IN AN ORGANIZED HEALTH CARE EDUCATION/TRAINING PROGRAM

## 2022-03-29 PROCEDURE — 3044F HG A1C LEVEL LT 7.0%: CPT | Mod: CPTII,S$GLB,, | Performed by: OPTOMETRIST

## 2022-03-29 PROCEDURE — 3074F SYST BP LT 130 MM HG: CPT | Mod: CPTII,S$GLB,, | Performed by: STUDENT IN AN ORGANIZED HEALTH CARE EDUCATION/TRAINING PROGRAM

## 2022-03-29 PROCEDURE — 3044F PR MOST RECENT HEMOGLOBIN A1C LEVEL <7.0%: ICD-10-PCS | Mod: CPTII,S$GLB,, | Performed by: STUDENT IN AN ORGANIZED HEALTH CARE EDUCATION/TRAINING PROGRAM

## 2022-03-29 PROCEDURE — 96372 THER/PROPH/DIAG INJ SC/IM: CPT | Mod: 59,S$GLB,, | Performed by: STUDENT IN AN ORGANIZED HEALTH CARE EDUCATION/TRAINING PROGRAM

## 2022-03-29 PROCEDURE — 3044F HG A1C LEVEL LT 7.0%: CPT | Mod: CPTII,S$GLB,, | Performed by: STUDENT IN AN ORGANIZED HEALTH CARE EDUCATION/TRAINING PROGRAM

## 2022-03-29 PROCEDURE — 99214 PR OFFICE/OUTPT VISIT, EST, LEVL IV, 30-39 MIN: ICD-10-PCS | Mod: 25,S$GLB,, | Performed by: STUDENT IN AN ORGANIZED HEALTH CARE EDUCATION/TRAINING PROGRAM

## 2022-03-29 PROCEDURE — 1159F PR MEDICATION LIST DOCUMENTED IN MEDICAL RECORD: ICD-10-PCS | Mod: CPTII,S$GLB,, | Performed by: OPTOMETRIST

## 2022-03-29 PROCEDURE — 1159F PR MEDICATION LIST DOCUMENTED IN MEDICAL RECORD: ICD-10-PCS | Mod: CPTII,S$GLB,, | Performed by: STUDENT IN AN ORGANIZED HEALTH CARE EDUCATION/TRAINING PROGRAM

## 2022-03-29 PROCEDURE — 92012 PR EYE EXAM, EST PATIENT,INTERMED: ICD-10-PCS | Mod: S$GLB,,, | Performed by: OPTOMETRIST

## 2022-03-29 PROCEDURE — 3008F BODY MASS INDEX DOCD: CPT | Mod: CPTII,S$GLB,, | Performed by: STUDENT IN AN ORGANIZED HEALTH CARE EDUCATION/TRAINING PROGRAM

## 2022-03-29 PROCEDURE — 1159F MED LIST DOCD IN RCRD: CPT | Mod: CPTII,S$GLB,, | Performed by: STUDENT IN AN ORGANIZED HEALTH CARE EDUCATION/TRAINING PROGRAM

## 2022-03-29 PROCEDURE — 1159F MED LIST DOCD IN RCRD: CPT | Mod: CPTII,S$GLB,, | Performed by: OPTOMETRIST

## 2022-03-29 PROCEDURE — 3008F PR BODY MASS INDEX (BMI) DOCUMENTED: ICD-10-PCS | Mod: CPTII,S$GLB,, | Performed by: STUDENT IN AN ORGANIZED HEALTH CARE EDUCATION/TRAINING PROGRAM

## 2022-03-29 PROCEDURE — 3074F PR MOST RECENT SYSTOLIC BLOOD PRESSURE < 130 MM HG: ICD-10-PCS | Mod: CPTII,S$GLB,, | Performed by: STUDENT IN AN ORGANIZED HEALTH CARE EDUCATION/TRAINING PROGRAM

## 2022-03-29 PROCEDURE — 99999 PR PBB SHADOW E&M-EST. PATIENT-LVL IV: CPT | Mod: PBBFAC,,, | Performed by: OPTOMETRIST

## 2022-03-29 PROCEDURE — 92012 INTRM OPH EXAM EST PATIENT: CPT | Mod: S$GLB,,, | Performed by: OPTOMETRIST

## 2022-03-29 PROCEDURE — 99214 OFFICE O/P EST MOD 30 MIN: CPT | Mod: 25,S$GLB,, | Performed by: STUDENT IN AN ORGANIZED HEALTH CARE EDUCATION/TRAINING PROGRAM

## 2022-03-29 PROCEDURE — 94640 AIRWAY INHALATION TREATMENT: CPT | Mod: S$GLB,,, | Performed by: STUDENT IN AN ORGANIZED HEALTH CARE EDUCATION/TRAINING PROGRAM

## 2022-03-29 PROCEDURE — 94640 PR INHAL RX, AIRWAY OBST/DX SPUTUM INDUCT: ICD-10-PCS | Mod: S$GLB,,, | Performed by: STUDENT IN AN ORGANIZED HEALTH CARE EDUCATION/TRAINING PROGRAM

## 2022-03-29 PROCEDURE — 3079F DIAST BP 80-89 MM HG: CPT | Mod: CPTII,S$GLB,, | Performed by: STUDENT IN AN ORGANIZED HEALTH CARE EDUCATION/TRAINING PROGRAM

## 2022-03-29 RX ORDER — CEFTRIAXONE 1 G/1
1 INJECTION, POWDER, FOR SOLUTION INTRAMUSCULAR; INTRAVENOUS
Status: COMPLETED | OUTPATIENT
Start: 2022-03-29 | End: 2022-03-29

## 2022-03-29 RX ORDER — AZITHROMYCIN 250 MG/1
TABLET, FILM COATED ORAL
Qty: 6 TABLET | Refills: 0 | Status: SHIPPED | OUTPATIENT
Start: 2022-03-29 | End: 2022-04-03

## 2022-03-29 RX ORDER — LIDOCAINE HYDROCHLORIDE 10 MG/ML
2 INJECTION INFILTRATION; PERINEURAL
Status: COMPLETED | OUTPATIENT
Start: 2022-03-29 | End: 2022-03-29

## 2022-03-29 RX ORDER — IPRATROPIUM BROMIDE AND ALBUTEROL SULFATE 2.5; .5 MG/3ML; MG/3ML
3 SOLUTION RESPIRATORY (INHALATION)
Status: COMPLETED | OUTPATIENT
Start: 2022-03-29 | End: 2022-03-29

## 2022-03-29 RX ORDER — IPRATROPIUM BROMIDE AND ALBUTEROL SULFATE 2.5; .5 MG/3ML; MG/3ML
3 SOLUTION RESPIRATORY (INHALATION) EVERY 6 HOURS PRN
Qty: 90 ML | Refills: 3 | Status: SHIPPED | OUTPATIENT
Start: 2022-03-29 | End: 2023-07-11

## 2022-03-29 RX ADMIN — CEFTRIAXONE 1 G: 1 INJECTION, POWDER, FOR SOLUTION INTRAMUSCULAR; INTRAVENOUS at 11:03

## 2022-03-29 RX ADMIN — LIDOCAINE HYDROCHLORIDE 2 ML: 10 INJECTION INFILTRATION; PERINEURAL at 11:03

## 2022-03-29 RX ADMIN — IPRATROPIUM BROMIDE AND ALBUTEROL SULFATE 3 ML: 2.5; .5 SOLUTION RESPIRATORY (INHALATION) at 11:03

## 2022-03-29 NOTE — PROGRESS NOTES
Subjective:      Patient ID: Joyce Peterson is a 61 y.o. female.    Chief Complaint: Cough (Pt has a cough and chest congestion. Pt states that her chest hurts from coughing and etc. Pt states that her chest feels heavy. )    - seen last week in clinic for cough/congestion; doxy and OTC recommendations; she notes initial improvement then worsened again  - has been increasing the steroids but could not handle the 20mg that was initially prescribed, was able to tolerate 10 and has started to taper back down to her normal 5mg dose now  - cough and wheezing and chest tightness are the worst; hx asthma; using Symbicort and albuterol inhaler (at least 2x/day) but not helping much   - usually Duoneb helps   - coughing up green sputum, thick   - flonase helps but also makes eyes more dry   - hx bronchitis and feels liek is at that point again     Cough  This is a new problem. The current episode started 1 to 4 weeks ago. The problem has been waxing and waning. The problem occurs constantly. The cough is productive of purulent sputum. Associated symptoms include nasal congestion, postnasal drip, rhinorrhea, shortness of breath and wheezing. Pertinent negatives include no fever, myalgias, rash, sore throat or weight loss. Nothing aggravates the symptoms. She has tried a beta-agonist inhaler, body position changes, oral steroids, OTC cough suppressant, OTC inhaler, steroid inhaler and leukotriene antagonists for the symptoms. The treatment provided mild relief. Her past medical history is significant for asthma, bronchitis and environmental allergies.     Review of Systems   Constitutional: Positive for fatigue. Negative for fever and weight loss.   HENT: Positive for congestion, postnasal drip and rhinorrhea. Negative for sore throat.    Respiratory: Positive for cough, chest tightness, shortness of breath and wheezing.    Musculoskeletal: Negative for myalgias.   Skin: Negative for rash.   Allergic/Immunologic: Positive  for environmental allergies.        Objective:     Vitals:    03/29/22 1006   BP: 126/84   Pulse: 94   Temp: 97.7 °F (36.5 °C)      Physical Exam  Vitals reviewed.   Constitutional:       Appearance: Normal appearance. She is obese.   HENT:      Head: Atraumatic.      Right Ear: A middle ear effusion is present.      Left Ear: A middle ear effusion is present.   Eyes:      Conjunctiva/sclera: Conjunctivae normal.   Cardiovascular:      Rate and Rhythm: Tachycardia present.   Pulmonary:      Effort: Pulmonary effort is normal.      Breath sounds: Decreased air movement present. Wheezing present. No rhonchi or rales.   Neurological:      General: No focal deficit present.      Mental Status: She is alert and oriented to person, place, and time.   Psychiatric:         Mood and Affect: Mood normal.         Behavior: Behavior normal.        Assessment:         1. Moderate persistent asthma with acute exacerbation    2. Bronchitis          Plan:   1. Moderate persistent asthma with acute exacerbation  - albuterol-ipratropium 2.5 mg-0.5 mg/3 mL nebulizer solution 3 mL  - albuterol-ipratropium (DUO-NEB) 2.5 mg-0.5 mg/3 mL nebulizer solution; Take 3 mLs by nebulization every 6 (six) hours as needed for Wheezing. Rescue  Dispense: 90 mL; Refill: 3  - azithromycin (Z-EDVON) 250 MG tablet; Take 2 tablets by mouth on day 1; Take 1 tablet by mouth on days 2-5  Dispense: 6 tablet; Refill: 0  - cefTRIAXone injection 1 g  - LIDOcaine HCL 10 mg/ml (1%) injection 2 mL    2. Bronchitis  - albuterol-ipratropium 2.5 mg-0.5 mg/3 mL nebulizer solution 3 mL  - albuterol-ipratropium (DUO-NEB) 2.5 mg-0.5 mg/3 mL nebulizer solution; Take 3 mLs by nebulization every 6 (six) hours as needed for Wheezing. Rescue  Dispense: 90 mL; Refill: 3  - azithromycin (Z-DEVON) 250 MG tablet; Take 2 tablets by mouth on day 1; Take 1 tablet by mouth on days 2-5  Dispense: 6 tablet; Refill: 0  - cefTRIAXone injection 1 g  - LIDOcaine HCL 10 mg/ml (1%) injection 2  Holley Cullen in clinic; good response; refill home solution   Rocephin in clinic  Start Zpak for atypical coverage in high risk pt  Continue with slow taper of steroids back to usual daily dose  Continue with OTC meds   RTC if symptoms worsen or fail to improve  ER precautions also reviewed        Sarah A. Champagne [facility]   3/29/22

## 2022-03-29 NOTE — PROGRESS NOTES
"HPI     Patient's age: 61 y.o. WF   Occupation: Disabled   Approximate date of last eye examination: 03/03/2021  Name of last eye doctor seen:    City/State: [facility]   Wears glasses? Yes      If yes, wears  Full-time or part-time?  full-time   Present glasses are: Bifocal, SV Distance, SV Reading?  Progressive lens   Approximate age of present glasses:  1 yrs    Any problem with VA with glasses?  no     Wears CLs?:  Yes - part-time           If yes:               Type of CL worn:  1 Day Acuvue Moist                                             OD 8.5    +4.75 sphere                                              OS 8.5    +3.75sphere               Wears full-time or part-time:  Part time                Sleeps with contact lenses:  No                CL Solution used:                  How often replace CLs:  Dailies               Any problem with VA with CLs?  no                 Headaches?  yes    Eye pain/discomfort?  dryness symptoms, blurry vision , pain                                                                                     Flashes?  No   Floaters?  No   Diplopia/Double vision?  No   Patient's Ocular History:          Any eye surgeries? No          Any eye injury?  No          Any treatment for eye disease?  No   Family history of eye disease?  No   Significant patient medical history:         1. Diabetes?  no      If yes, IDDM or NIDDM?   n/a               2. HBP?  No               3. Other (describe):   rheumatoid arthritis    ! OTC eyedrops currently using:  refresh during the day  , systane gel at   night     ! Prescription eye meds currently using:            Serum tears (Dr. Hardy)  QID OU  "every time I pass the   refrigerator"            Restasis OU BID            Refresh ATs - all day OU, and gel drops at bedtime            Xiidra BID OU (both Restasis and Xiidra, per Dr. Armas)   ! Any history of allergy/adverse reaction to any eye meds used   previously?  No      ! Any history of " "allergy/adverse reaction to eyedrops used during prior   eye exam(s)? no    ! Any history of allergy/adverse reaction to Novacaine or similar meds?   no    ! Any history of allergy/adverse reaction to Epinephrine or similar meds?   no    ! Patient okay with use of anesthetic eyedrops to check eye pressure?    yes        ! Patient okay with use of eyedrops to dilate pupils today?  NO    !  Allergies/Medications/Medical History/Family History reviewed today?    yes       PD =   62/59  Desired reading distance =   17.25"    Last edited by Micky Alatorre MA on 3/29/2022  3:31 PM. (History)            Assessment /Plan     For exam results, see Encounter Report.    1. Irritation of both eyes     2. Bilateral dry eyes     3. Nuclear sclerosis of both eyes                  Bilateral dry eye flare up  Eye irritation of both eyes.  FML 0.10% ophthalmic suspension in both eyes two times per day  Cold compresses to both eyes.  Cooled artificial tears in both eyes as often as desired.    Okay to continue Restasis, and Xiidra, and autologous tears in both eyes.    Return in one week for progress check - or prior, if any worsening of symptoms noted in the interim       "

## 2022-03-29 NOTE — PATIENT INSTRUCTIONS
Bilateral dry eye flare up  Eye irritation of both eyes.  FML 0.10% ophthalmic suspension in both eyes two times per day  Cold compresses to both eyes.  Cooled artificial tears in both eyes as often as desired.    Okay to continue Restasis, and Xiidra, and autologous tears in both eyes.    Return in one week for progress check - or prior, if any worsening of symptoms noted in the interim

## 2022-03-31 ENCOUNTER — TELEPHONE (OUTPATIENT)
Dept: GASTROENTEROLOGY | Facility: CLINIC | Age: 62
End: 2022-03-31
Payer: MEDICARE

## 2022-03-31 ENCOUNTER — PATIENT MESSAGE (OUTPATIENT)
Dept: PAIN MEDICINE | Facility: CLINIC | Age: 62
End: 2022-03-31
Payer: MEDICARE

## 2022-03-31 NOTE — TELEPHONE ENCOUNTER
----- Message from Lynette Angulo sent at 3/31/2022 10:53 AM CDT -----  Regarding: RE: Cisapride  Thank you  ----- Message -----  From: Linda Boone MA  Sent: 3/30/2022   1:58 PM CDT  To: Lynette Angulo  Subject: Cisapride                                        Hi, I have scheduled Joyce for her follow up with  on 6/14 at 1:00.  I will schedule lab work and EKG in the next week.   needs to place orders.   Delilah

## 2022-04-04 DIAGNOSIS — M25.511 RIGHT SHOULDER PAIN, UNSPECIFIED CHRONICITY: Primary | ICD-10-CM

## 2022-04-05 ENCOUNTER — OFFICE VISIT (OUTPATIENT)
Dept: OPTOMETRY | Facility: CLINIC | Age: 62
End: 2022-04-05
Payer: MEDICARE

## 2022-04-05 DIAGNOSIS — K31.84 GASTROPARESIS: Primary | ICD-10-CM

## 2022-04-05 DIAGNOSIS — H04.123 BILATERAL DRY EYES: ICD-10-CM

## 2022-04-05 DIAGNOSIS — H57.89 IRRITATION OF BOTH EYES: Primary | ICD-10-CM

## 2022-04-05 DIAGNOSIS — H25.13 NUCLEAR SCLEROSIS OF BOTH EYES: ICD-10-CM

## 2022-04-05 PROCEDURE — 1159F PR MEDICATION LIST DOCUMENTED IN MEDICAL RECORD: ICD-10-PCS | Mod: CPTII,S$GLB,, | Performed by: OPTOMETRIST

## 2022-04-05 PROCEDURE — 99999 PR PBB SHADOW E&M-EST. PATIENT-LVL IV: ICD-10-PCS | Mod: PBBFAC,,, | Performed by: OPTOMETRIST

## 2022-04-05 PROCEDURE — 99999 PR PBB SHADOW E&M-EST. PATIENT-LVL IV: CPT | Mod: PBBFAC,,, | Performed by: OPTOMETRIST

## 2022-04-05 PROCEDURE — 3044F PR MOST RECENT HEMOGLOBIN A1C LEVEL <7.0%: ICD-10-PCS | Mod: CPTII,S$GLB,, | Performed by: OPTOMETRIST

## 2022-04-05 PROCEDURE — 1159F MED LIST DOCD IN RCRD: CPT | Mod: CPTII,S$GLB,, | Performed by: OPTOMETRIST

## 2022-04-05 PROCEDURE — 99499 NO LOS: ICD-10-PCS | Mod: S$GLB,,, | Performed by: OPTOMETRIST

## 2022-04-05 PROCEDURE — 3044F HG A1C LEVEL LT 7.0%: CPT | Mod: CPTII,S$GLB,, | Performed by: OPTOMETRIST

## 2022-04-05 PROCEDURE — 99499 UNLISTED E&M SERVICE: CPT | Mod: S$GLB,,, | Performed by: OPTOMETRIST

## 2022-04-05 RX ORDER — LOTEPREDNOL ETABONATE 5 MG/ML
1 SUSPENSION/ DROPS OPHTHALMIC 2 TIMES DAILY
Qty: 5 ML | Refills: 1 | Status: SHIPPED | OUTPATIENT
Start: 2022-04-05 | End: 2022-05-13

## 2022-04-06 NOTE — PATIENT INSTRUCTIONS
Bilateral dry eye flare up  Eye irritation of both eyes.  Has been usin FML 0.10% ophthalmic suspension in both eyes two times per day  Cold compresses to both eyes.  Cooled artificial tears in both eyes.     Using Restasis, and Xiidra, and autologous tears in both eyes.    Signs/symptoms somewhat improved since last visit, but still symptomatic.    Discussed with Mrs. Peterson.  Discontinue use of FML ophthalmic suspension.  Prescribed Lotemax ophthalmic suspension for use in both eyes two times per day, in lieu of FML drops.  Continue with treatment as noted above, otherwise.     Call/email in one week with progress check.

## 2022-04-06 NOTE — PROGRESS NOTES
"HPI     Urgent     Pt states her that when she started her drops TID for the first 2 days she   was fine. Went to BID until current. Pt states she feels like her eyes are   still very dry, irritated with blurriness. Pt also states she has   fluctuating dull ache OU. No pain    Patient's age: 61 y.o. WF   Occupation: Disabled   Approximate date of last eye examination: 03/29/2021   Name of last eye doctor seen:    City/State: [facility]   Wears glasses? Yes      If yes, wears  Full-time or part-time?  full-time   Present g lasses are: Bifocal, SV Distance, SV Reading?  Progressive lens   Approximate age of present glasses:  1 yrs    Any problem with VA with glasses?  no     Wears CLs?:  Yes - part-time            If yes:               Type of CL worn:  1 Day Acuvue Moist                                             OD 8.5    +4.75 sphere                                              OS 8.5    +3.75sphere               Wears full-time or part-time:  Part time                Sleeps with contact lenses:  No                CL So lution used:                  How often replace CLs:  Dailies               Any problem with VA with CLs?  no                 Headaches?  No  Eye pain/discomfort?  dryness symptoms, blurry vision , pain                                                                                     Flashes?  No   Floaters?  No   Diplopia/Double vision?  No   Patient's Ocular History:          Any eye surgeries? No          Any eye injury?  No          Any treatment for eye disease?  No   Family history of eye disea se?  No   Significant patient medical history:         1. Diabetes?  no      If yes, IDDM or NIDDM?   n/a               2. HBP?  No               3. Other (describe):   rheumatoid arthritis    ! OTC eyedrops currently using:  refresh during the day  , sy stane gel   at night      ! Prescription eye meds currently using:            Serum tears (Dr. Hardy)  QID OU  "every time I pass the " "  refrigerator"            Restasis OU BID            Refresh ATs - all day OU, and gel drops at bedtime            Xiidra BID OU (both Restasis and Xiidra, per Dr. Armas)    ! Any history of allergy/adverse reaction to any eye meds used   previously?  No      ! Any history of allergy/adverse reaction to eyedrops used during prior   eye exam(s)? no    ! Any history of allergy/a dverse reaction to Novacaine or similar meds?   no    ! Any history of allergy/adverse reaction to Epinephrine or similar meds?   no    ! Patient okay with use of anesthetic eyedrops to check eye pressure?    yes        ! Patient okay with use of eyedrops to d ilate pupils today?  NO    !  Allergies/Medications/Medical History/Family History reviewed today?    yes     Last edited by Dimple Butler on 4/5/2022  2:04 PM. (History)            Assessment /Plan     For exam results, see Encounter Report.    1. Irritation of both eyes  loteprednol (LOTEMAX) 0.5 % ophthalmic suspension   2. Bilateral dry eyes  loteprednol (LOTEMAX) 0.5 % ophthalmic suspension   3. Nuclear sclerosis of both eyes                    Bilateral dry eye flare up  Eye irritation of both eyes.  Has been usin FML 0.10% ophthalmic suspension in both eyes two times per day  Cold compresses to both eyes.  Cooled artificial tears in both eyes.     Using Restasis, and Xiidra, and autologous tears in both eyes.    Signs/symptoms somewhat improved since last visit, but still symptomatic.    Discussed with Mrs. Peterson.  Discontinue use of FML ophthalmic suspension.  Prescribed Lotemax ophthalmic suspension for use in both eyes two times per day, in lieu of FML drops.  Continue with treatment as noted above, otherwise.     Call/email in one week with progress check.       "

## 2022-04-11 ENCOUNTER — TELEPHONE (OUTPATIENT)
Dept: PAIN MEDICINE | Facility: CLINIC | Age: 62
End: 2022-04-11
Payer: MEDICARE

## 2022-04-11 ENCOUNTER — PATIENT MESSAGE (OUTPATIENT)
Dept: ORTHOPEDICS | Facility: CLINIC | Age: 62
End: 2022-04-11
Payer: MEDICARE

## 2022-04-11 ENCOUNTER — TELEPHONE (OUTPATIENT)
Dept: UROLOGY | Facility: CLINIC | Age: 62
End: 2022-04-11
Payer: MEDICARE

## 2022-04-11 ENCOUNTER — SPECIALTY PHARMACY (OUTPATIENT)
Dept: PHARMACY | Facility: CLINIC | Age: 62
End: 2022-04-11
Payer: MEDICARE

## 2022-04-11 RX ORDER — FLUCONAZOLE 100 MG/1
TABLET ORAL
Qty: 14 TABLET | Refills: 0 | Status: SHIPPED | OUTPATIENT
Start: 2022-04-11 | End: 2022-05-09 | Stop reason: SDUPTHER

## 2022-04-11 NOTE — TELEPHONE ENCOUNTER
----- Message from Ashly Kuhnfield sent at 4/11/2022 10:09 AM CDT -----  Who Called: TONNY GOMEZ     What is the request in detail: Patient needs to reschedule her procedure on 05/05 due to being out of town that whole week. Please advise.     Can the clinic reply by [facility]?: Yes    Best Call Back Number: [phone]

## 2022-04-11 NOTE — TELEPHONE ENCOUNTER
Specialty Pharmacy - Refill Coordination    Specialty Medication Orders Linked to Encounter    Flowsheet Row Most Recent Value   Medication #1 sarilumab (KEVZARA) 200 mg/1.14 mL Syrg (Order#[id number], Rx#[id number])          Refill Questions - Documented Responses    Flowsheet Row Most Recent Value   Patient Availability and HIPAA Verification    Does patient want to proceed with activity? Yes   HIPAA/medical authority confirmed? Yes   Relationship to patient of person spoken to? Self   Refill Screening Questions    Changes to allergies? No   Changes to medications? No   New conditions since last clinic visit? No   Unplanned office visit, urgent care, ED, or hospital admission in the last 4 weeks? No   How does patient/caregiver feel medication is working? Good   Financial problems or insurance changes? No   How many doses of your specialty medications were missed in the last 4 weeks? 1   Why were doses missed? Felt ill or sick   Would patient like to speak to a pharmacist? No   When does the patient need to receive the medication? 04/18/22   Refill Delivery Questions    How will the patient receive the medication? Delivery Jaymie   When does the patient need to receive the medication? 04/18/22   Shipping Address Home   Address in [facility] confirmed and updated if neccessary? Yes   Expected Copay ($) 55   Is the patient able to afford the medication copay? Yes   Payment Method CC on file   Days supply of Refill 28   Supplies needed? No supplies needed   Refill activity completed? Yes   Refill activity plan Refill scheduled   Shipment/Pickup Date: 04/14/22          Current Outpatient Medications   Medication Sig    albuterol (PROVENTIL/VENTOLIN HFA) 90 mcg/actuation inhaler INHALE 2 PUFFS INTO THE LUNGS EVERY 6 (SIX) HOURS AS NEEDED. RESCUE    albuterol-ipratropium (DUO-NEB) 2.5 mg-0.5 mg/3 mL nebulizer solution Take 3 mLs by nebulization every 6 (six) hours as needed for Wheezing. Rescue    aspirin 81 mg  Tab Take 81 mg by mouth every morning.     budesonide-formoterol 160-4.5 mcg (SYMBICORT) 160-4.5 mcg/actuation HFAA INHALE 2 PUFFS INTO THE LUNGS EVERY 12 (TWELVE) HOURS.    calcium citrate-vitamin D3 315-200 mg (CITRACAL+D) 315 mg-5 mcg (200 unit) per tablet Take 1 tablet by mouth 2 (two) times daily.     conjugated estrogens (PREMARIN) vaginal cream insert 1 gram vaginally as directed    cycloSPORINE (RESTASIS) 0.05 % ophthalmic emulsion Instill one drop into both eyes two times per day    diclofenac sodium (VOLTAREN) 1 % Gel APPLY 2 GRAMS TOPICALLY 4 (FOUR) TIMES DAILY AS NEEDED.    erythromycin (ROMYCIN) ophthalmic ointment Place into the left eye 2 (two) times daily. Apply to affected external eye tissue    estrogens, conjugated, (PREMARIN) 0.625 MG tablet take 1 tablet by mouth daily    fluconazole (DIFLUCAN) 100 MG tablet Take 1 tablet by mouth once daily for 14 days    halobetasol propion-tazarotene (DUOBRII) 0.01-0.045 % Lotn aaa on feet and hands qhs  then vaseline and warm towel    INV PROPULSID 10 MG Take 1 tablets (20 mg) by mouth 4 (four) times daily. FOR INVESTIGATIONAL USE ONLY. Protocol [id number]    ketoconazole (NIZORAL) 2 % cream Apply to feet twice daily for 3 weeks (Patient taking differently: Apply to feet twice daily for 3 weeks)    leflunomide (ARAVA) 20 MG Tab TAKE 1 TABLET BY MOUTH EVERY DAY    lifitegrast (XIIDRA) 5 % Dpet INSTILL ONE DROP INTO BOTH EYES TWICE A DAY (Patient taking differently: INSTILL ONE DROP INTO BOTH EYES TWICE A DAY)    loteprednol (LOTEMAX) 0.5 % ophthalmic suspension Place 1 drop into both eyes 2 (two) times a day. for 7 days    methenamine (HIPREX) 1 gram Tab Take 1 tablet (1 g total) by mouth 2 (two) times daily.    mirabegron (MYRBETRIQ) 25 mg Tb24 ER tablet Take 1 tablet (25 mg total) by mouth once daily.    montelukast (SINGULAIR) 10 mg tablet TAKE 1 TABLET BY MOUTH EVERY DAY AT NIGHT    naproxen (NAPROSYN) 500 MG tablet Take 1 tablet (500  mg total) by mouth 2 (two) times daily as needed.    omeprazole (PRILOSEC) 40 MG capsule Take 1 capsule (40 mg total) by mouth every morning.    omeprazole-sodium bicarbonate (ZEGERID) 40-1.1 mg-gram per capsule TAKE 1 CAPSULE BY MOUTH EVERY DAY IN THE MORNING    POTASSIUM ORAL Take by mouth once daily.    predniSONE (DELTASONE) 5 MG tablet TAKE 1 TABLET BY MOUTH EVERY DAY    pregabalin (LYRICA) 50 MG capsule Take 1 capsule (50 mg total) by mouth every evening.    progesterone (PROMETRIUM) 100 MG capsule Take 1 capsule by mouth daily.    progesterone (PROMETRIUM) 100 MG capsule take 1 capsule by mouth daily    promethazine (PHENERGAN) 25 MG tablet Take 1 tablet (25 mg total) by mouth every 6 (six) hours as needed for Nausea.    rosuvastatin (CRESTOR) 20 MG tablet Take 1 tablet (20 mg total) by mouth every evening.    sarilumab (KEVZARA) 200 mg/1.14 mL Syrg Inject 1.14 mL (200 mg total) into the skin every 14 (fourteen) days.    sucralfate (CARAFATE) 1 gram tablet Take 1 tablet (1 g total) by mouth 4 (four) times daily.    traMADoL (ULTRAM) 50 mg tablet TAKE 1 TABLET (50 MG TOTAL) BY MOUTH EVERY 12 (TWELVE) HOURS AS NEEDED FOR PAIN.   Last reviewed on 4/10/2022 10:14 AM by Patrice Jurado OD    Review of patient's allergies indicates:   Allergen Reactions    Actemra [tocilizumab] Other (See Comments)     Severe dizziness    Codeine Nausea And Vomiting    Gold au 198 Hives and Rash    Hydroxychloroquine Other (See Comments)     Can't remember the reaction      Iodinated contrast media Other (See Comments)     Other reaction(s): BURNING ALL OVER    Iodine Other (See Comments)     Other reaction(s): BURNING ALL OVER - Iodine dye - Not topical    Sulfa (sulfonamide antibiotics) Other (See Comments)     Can't remember the reaction    Zofran [ondansetron hcl (pf)] Nausea And Vomiting     Pt reports that last time she received zofran she started vomiting again    Methotrexate analogues Nausea Only  "   Pneumovax 23 [pneumococcal 23-argenis ps vaccine] Other (See Comments)     "sick"    Last reviewed on  4/10/2022 10:14 AM by Patrice Jurado      Tasks added this encounter   5/9/2022 - Refill Call (Auto Added)   Tasks due within next 3 months   No tasks due.     Rosi Jama, PharmD  [facility]  [address] [place] [id number]  Phone: [phone]  Fax: [phone]      "

## 2022-04-12 ENCOUNTER — OFFICE VISIT (OUTPATIENT)
Dept: UROLOGY | Facility: CLINIC | Age: 62
End: 2022-04-12
Payer: MEDICARE

## 2022-04-12 VITALS
BODY MASS INDEX: 30.82 KG/M2 | HEIGHT: 60 IN | DIASTOLIC BLOOD PRESSURE: 74 MMHG | OXYGEN SATURATION: 97 % | HEART RATE: 85 BPM | SYSTOLIC BLOOD PRESSURE: 128 MMHG | WEIGHT: 157 LBS

## 2022-04-12 DIAGNOSIS — R30.0 DYSURIA: ICD-10-CM

## 2022-04-12 DIAGNOSIS — N28.89 URETEROCELE: ICD-10-CM

## 2022-04-12 DIAGNOSIS — R39.15 URINARY URGENCY: Primary | ICD-10-CM

## 2022-04-12 DIAGNOSIS — N39.41 URGE URINARY INCONTINENCE: ICD-10-CM

## 2022-04-12 DIAGNOSIS — Z87.440 HISTORY OF RECURRENT UTI (URINARY TRACT INFECTION): ICD-10-CM

## 2022-04-12 DIAGNOSIS — R35.1 NOCTURIA: ICD-10-CM

## 2022-04-12 LAB
BILIRUB UR QL STRIP: NEGATIVE
CLARITY UR REFRACT.AUTO: CLEAR
COLOR UR AUTO: COLORLESS
GLUCOSE UR QL STRIP: NEGATIVE
HGB UR QL STRIP: NEGATIVE
KETONES UR QL STRIP: NEGATIVE
LEUKOCYTE ESTERASE UR QL STRIP: NEGATIVE
NITRITE UR QL STRIP: NEGATIVE
PH UR STRIP: 6 [PH] (ref 5–8)
PROT UR QL STRIP: NEGATIVE
SP GR UR STRIP: 1 (ref 1–1.03)
URN SPEC COLLECT METH UR: ABNORMAL

## 2022-04-12 PROCEDURE — 1159F PR MEDICATION LIST DOCUMENTED IN MEDICAL RECORD: ICD-10-PCS | Mod: CPTII,S$GLB,, | Performed by: NURSE PRACTITIONER

## 2022-04-12 PROCEDURE — 3044F PR MOST RECENT HEMOGLOBIN A1C LEVEL <7.0%: ICD-10-PCS | Mod: CPTII,S$GLB,, | Performed by: NURSE PRACTITIONER

## 2022-04-12 PROCEDURE — 87086 URINE CULTURE/COLONY COUNT: CPT | Performed by: NURSE PRACTITIONER

## 2022-04-12 PROCEDURE — 1160F RVW MEDS BY RX/DR IN RCRD: CPT | Mod: CPTII,S$GLB,, | Performed by: NURSE PRACTITIONER

## 2022-04-12 PROCEDURE — 99214 PR OFFICE/OUTPT VISIT, EST, LEVL IV, 30-39 MIN: ICD-10-PCS | Mod: S$GLB,,, | Performed by: NURSE PRACTITIONER

## 2022-04-12 PROCEDURE — 1159F MED LIST DOCD IN RCRD: CPT | Mod: CPTII,S$GLB,, | Performed by: NURSE PRACTITIONER

## 2022-04-12 PROCEDURE — 3074F SYST BP LT 130 MM HG: CPT | Mod: CPTII,S$GLB,, | Performed by: NURSE PRACTITIONER

## 2022-04-12 PROCEDURE — 1160F PR REVIEW ALL MEDS BY PRESCRIBER/CLIN PHARMACIST DOCUMENTED: ICD-10-PCS | Mod: CPTII,S$GLB,, | Performed by: NURSE PRACTITIONER

## 2022-04-12 PROCEDURE — 99999 PR PBB SHADOW E&M-EST. PATIENT-LVL V: ICD-10-PCS | Mod: PBBFAC,,, | Performed by: NURSE PRACTITIONER

## 2022-04-12 PROCEDURE — 3008F BODY MASS INDEX DOCD: CPT | Mod: CPTII,S$GLB,, | Performed by: NURSE PRACTITIONER

## 2022-04-12 PROCEDURE — 99999 PR PBB SHADOW E&M-EST. PATIENT-LVL V: CPT | Mod: PBBFAC,,, | Performed by: NURSE PRACTITIONER

## 2022-04-12 PROCEDURE — 3008F PR BODY MASS INDEX (BMI) DOCUMENTED: ICD-10-PCS | Mod: CPTII,S$GLB,, | Performed by: NURSE PRACTITIONER

## 2022-04-12 PROCEDURE — 81003 URINALYSIS AUTO W/O SCOPE: CPT | Performed by: NURSE PRACTITIONER

## 2022-04-12 PROCEDURE — 3044F HG A1C LEVEL LT 7.0%: CPT | Mod: CPTII,S$GLB,, | Performed by: NURSE PRACTITIONER

## 2022-04-12 PROCEDURE — 3074F PR MOST RECENT SYSTOLIC BLOOD PRESSURE < 130 MM HG: ICD-10-PCS | Mod: CPTII,S$GLB,, | Performed by: NURSE PRACTITIONER

## 2022-04-12 PROCEDURE — 3078F PR MOST RECENT DIASTOLIC BLOOD PRESSURE < 80 MM HG: ICD-10-PCS | Mod: CPTII,S$GLB,, | Performed by: NURSE PRACTITIONER

## 2022-04-12 PROCEDURE — 99214 OFFICE O/P EST MOD 30 MIN: CPT | Mod: S$GLB,,, | Performed by: NURSE PRACTITIONER

## 2022-04-12 PROCEDURE — 3078F DIAST BP <80 MM HG: CPT | Mod: CPTII,S$GLB,, | Performed by: NURSE PRACTITIONER

## 2022-04-12 RX ORDER — PHENAZOPYRIDINE HYDROCHLORIDE 200 MG/1
200 TABLET, FILM COATED ORAL 3 TIMES DAILY PRN
Qty: 9 TABLET | Refills: 0 | Status: SHIPPED | OUTPATIENT
Start: 2022-04-12 | End: 2022-04-15

## 2022-04-12 NOTE — PROGRESS NOTES
Subjective:       Patient ID: Joyce Peterson is a 61 y.o. female.    Chief Complaint: Urinary Frequency and burning when urinating (Color of urine is yellow and usually not as dark as it is )    Patient is new to me. She is a 62 yo WF who is here today for a UTI follow-up from 3 months ago. Patient reports she was doing well until yesterday she start experiencing some mild dysuria. Patient has a hx of recurrent UTI. Currently taking methenamine for that issue.     Dysuria   This is a new problem. The current episode started yesterday. The problem occurs intermittently. The problem has been unchanged. The quality of the pain is described as burning. The pain is at a severity of 2/10. The pain is mild. There has been no fever. There is no history of pyelonephritis. Associated symptoms include urgency (with leakage). Pertinent negatives include no behavior changes, chills, discharge, flank pain, frequency, hematuria, hesitancy, nausea, possible pregnancy, sweats, vomiting, weight loss, bubble bath use, constipation, rash or withholding. She has tried nothing for the symptoms. Her past medical history is significant for recurrent UTIs. There is no history of diabetes mellitus, hypertension, kidney stones or a single kidney.     Review of Systems   Constitutional: Negative for chills, fatigue, fever and weight loss.   Gastrointestinal: Positive for abdominal pain (generalized discomfort). Negative for change in bowel habit, constipation, diarrhea, nausea, vomiting and change in bowel habit.   Genitourinary: Positive for dysuria, nocturia (x1) and urgency (with leakage). Negative for decreased urine volume, difficulty urinating, flank pain, frequency, hematuria, hesitancy, pelvic pain, vaginal bleeding, vaginal discharge and vaginal pain.   Integumentary:  Negative for rash.   Neurological: Negative for dizziness, weakness and headaches.   Psychiatric/Behavioral: Negative.          Objective:      Physical  Exam  Vitals and nursing note reviewed.   Constitutional:       General: She is not in acute distress.     Appearance: She is well-developed. She is not ill-appearing.   HENT:      Head: Normocephalic and atraumatic.   Eyes:      Pupils: Pupils are equal, round, and reactive to light.   Cardiovascular:      Rate and Rhythm: Normal rate.   Pulmonary:      Effort: Pulmonary effort is normal. No respiratory distress.   Abdominal:      Palpations: Abdomen is soft.      Tenderness: There is no abdominal tenderness.   Musculoskeletal:         General: Normal range of motion.      Cervical back: Normal range of motion.   Skin:     General: Skin is warm and dry.   Neurological:      Mental Status: She is alert and oriented to person, place, and time.      Coordination: Coordination normal.   Psychiatric:         Mood and Affect: Mood normal.         Behavior: Behavior normal.         Thought Content: Thought content normal.         Judgment: Judgment normal.         Assessment:       Problem List Items Addressed This Visit    None     Visit Diagnoses     Urinary urgency    -  Primary    Relevant Orders    Urine culture    Urinalysis    Dysuria        Relevant Medications    phenazopyridine (PYRIDIUM) 200 MG tablet    Other Relevant Orders    Urine culture    Urinalysis    Nocturia        Relevant Orders    Urine culture    Urinalysis    History of recurrent UTI (urinary tract infection)        Relevant Orders    Urine culture    Urinalysis          Plan:       Joyce was seen today for urinary frequency and burning when urinating.    Diagnoses and all orders for this visit:    Urinary urgency  -     Urine culture  -     Urinalysis    Urge urinary incontinence  -     Urine culture  -     Urinalysis    Dysuria  -     phenazopyridine (PYRIDIUM) 200 MG tablet; Take 1 tablet (200 mg total) by mouth 3 (three) times daily as needed for Pain.  -     Urine culture  -     Urinalysis    Nocturia  -     Urine culture  -      Urinalysis    History of recurrent UTI (urinary tract infection)  -     Urine culture  -     Urinalysis    Ureterocele  -     Urine culture  -     Urinalysis    Other order  1. Continue taking methenamine (Hiprex) as prescribed for UTI prevention.     NOTE: Patient was taking mirabegron 25 mg in the past. No longer taking medication.  10/2021.    Follow-up pending urine cx results.    Anastasia Healy NP

## 2022-04-12 NOTE — PATIENT INSTRUCTIONS
U/A and urine cx   Continue taking methenamine (Hiprex) as prescribed for UTI prevention.   Follow-up pending urine cx results.

## 2022-04-13 ENCOUNTER — PATIENT MESSAGE (OUTPATIENT)
Dept: OPTOMETRY | Facility: CLINIC | Age: 62
End: 2022-04-13
Payer: MEDICARE

## 2022-04-13 LAB — BACTERIA UR CULT: NO GROWTH

## 2022-04-14 ENCOUNTER — TELEPHONE (OUTPATIENT)
Dept: OPHTHALMOLOGY | Facility: CLINIC | Age: 62
End: 2022-04-14
Payer: MEDICARE

## 2022-04-14 ENCOUNTER — PATIENT MESSAGE (OUTPATIENT)
Dept: UROLOGY | Facility: CLINIC | Age: 62
End: 2022-04-14
Payer: MEDICARE

## 2022-04-14 ENCOUNTER — HOSPITAL ENCOUNTER (OUTPATIENT)
Dept: RADIOLOGY | Facility: OTHER | Age: 62
Discharge: HOME | End: 2022-04-14
Attending: ORTHOPAEDIC SURGERY
Payer: MEDICARE

## 2022-04-14 ENCOUNTER — OFFICE VISIT (OUTPATIENT)
Dept: ORTHOPEDICS | Facility: CLINIC | Age: 62
End: 2022-04-14
Payer: MEDICARE

## 2022-04-14 VITALS — HEIGHT: 61 IN | WEIGHT: 157 LBS | BODY MASS INDEX: 29.64 KG/M2

## 2022-04-14 DIAGNOSIS — M25.511 RIGHT SHOULDER PAIN, UNSPECIFIED CHRONICITY: ICD-10-CM

## 2022-04-14 DIAGNOSIS — M19.019 AC JOINT ARTHROPATHY: Primary | ICD-10-CM

## 2022-04-14 PROCEDURE — 99214 PR OFFICE/OUTPT VISIT, EST, LEVL IV, 30-39 MIN: ICD-10-PCS | Mod: 25,S$GLB,, | Performed by: ORTHOPAEDIC SURGERY

## 2022-04-14 PROCEDURE — 3044F HG A1C LEVEL LT 7.0%: CPT | Mod: CPTII,S$GLB,, | Performed by: ORTHOPAEDIC SURGERY

## 2022-04-14 PROCEDURE — 99214 OFFICE O/P EST MOD 30 MIN: CPT | Mod: 25,S$GLB,, | Performed by: ORTHOPAEDIC SURGERY

## 2022-04-14 PROCEDURE — 3008F PR BODY MASS INDEX (BMI) DOCUMENTED: ICD-10-PCS | Mod: CPTII,S$GLB,, | Performed by: ORTHOPAEDIC SURGERY

## 2022-04-14 PROCEDURE — 1159F MED LIST DOCD IN RCRD: CPT | Mod: CPTII,S$GLB,, | Performed by: ORTHOPAEDIC SURGERY

## 2022-04-14 PROCEDURE — 20605 DRAIN/INJ JOINT/BURSA W/O US: CPT | Mod: RT,S$GLB,, | Performed by: ORTHOPAEDIC SURGERY

## 2022-04-14 PROCEDURE — 99999 PR PBB SHADOW E&M-EST. PATIENT-LVL III: ICD-10-PCS | Mod: PBBFAC,,, | Performed by: ORTHOPAEDIC SURGERY

## 2022-04-14 PROCEDURE — 99999 PR PBB SHADOW E&M-EST. PATIENT-LVL III: CPT | Mod: PBBFAC,,, | Performed by: ORTHOPAEDIC SURGERY

## 2022-04-14 PROCEDURE — 73030 X-RAY EXAM OF SHOULDER: CPT | Mod: TC,FY,RT

## 2022-04-14 PROCEDURE — 20605 INTERMEDIATE JOINT ASPIRATION/INJECTION: R ACROMIOCLAVICULAR: ICD-10-PCS | Mod: RT,S$GLB,, | Performed by: ORTHOPAEDIC SURGERY

## 2022-04-14 PROCEDURE — 73030 X-RAY EXAM OF SHOULDER: CPT | Mod: 26,RT,, | Performed by: RADIOLOGY

## 2022-04-14 PROCEDURE — 3044F PR MOST RECENT HEMOGLOBIN A1C LEVEL <7.0%: ICD-10-PCS | Mod: CPTII,S$GLB,, | Performed by: ORTHOPAEDIC SURGERY

## 2022-04-14 PROCEDURE — 73030 XR SHOULDER TRAUMA 3 VIEW RIGHT: ICD-10-PCS | Mod: 26,RT,, | Performed by: RADIOLOGY

## 2022-04-14 PROCEDURE — 3008F BODY MASS INDEX DOCD: CPT | Mod: CPTII,S$GLB,, | Performed by: ORTHOPAEDIC SURGERY

## 2022-04-14 PROCEDURE — 1159F PR MEDICATION LIST DOCUMENTED IN MEDICAL RECORD: ICD-10-PCS | Mod: CPTII,S$GLB,, | Performed by: ORTHOPAEDIC SURGERY

## 2022-04-14 RX ADMIN — TRIAMCINOLONE ACETONIDE 40 MG: 40 INJECTION, SUSPENSION INTRA-ARTICULAR; INTRAMUSCULAR at 09:04
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
## 2022-04-18 ENCOUNTER — PATIENT MESSAGE (OUTPATIENT)
Dept: ADMINISTRATIVE | Facility: OTHER | Age: 62
End: 2022-04-18
Payer: MEDICARE

## 2022-04-18 ENCOUNTER — TELEPHONE (OUTPATIENT)
Dept: OPHTHALMOLOGY | Facility: CLINIC | Age: 62
End: 2022-04-18
Payer: MEDICARE

## 2022-04-18 ENCOUNTER — PATIENT MESSAGE (OUTPATIENT)
Dept: OPTOMETRY | Facility: CLINIC | Age: 62
End: 2022-04-18
Payer: MEDICARE

## 2022-04-19 RX ORDER — TRIAMCINOLONE ACETONIDE 40 MG/ML
40 INJECTION, SUSPENSION INTRA-ARTICULAR; INTRAMUSCULAR
Status: DISCONTINUED | OUTPATIENT
Start: 2022-04-14 | End: 2022-04-19 | Stop reason: HOSPADM

## 2022-04-19 NOTE — PROCEDURES
Intermediate Joint Aspiration/Injection: R acromioclavicular    Date/Time: 4/14/2022 9:00 AM  Performed by: Staci Yarbrough MD  Authorized by: Staci Yarbrough MD     Consent Done?:  Yes (Verbal)  Indications:  Arthritis  Timeout: Prior to procedure the correct patient, procedure, and site was verified      Location:  Shoulder  Site:  R acromioclavicular  Prep: Patient was prepped and draped in usual sterile fashion    Medications:  40 mg triamcinolone acetonide 40 mg/mL

## 2022-04-19 NOTE — PROGRESS NOTES
I have personally taken the history and examined this patient. I agree with the resident's note as stated above.     R AC joint arthritis     Plan:  - injection today R AC joint  - to start therapy as well  - return to clinic PRN

## 2022-04-21 ENCOUNTER — HOSPITAL ENCOUNTER (OUTPATIENT)
Facility: OTHER | Age: 62
Discharge: HOME | End: 2022-04-21
Attending: ANESTHESIOLOGY | Admitting: ANESTHESIOLOGY
Payer: MEDICARE

## 2022-04-21 VITALS
TEMPERATURE: 98 F | BODY MASS INDEX: 29.27 KG/M2 | RESPIRATION RATE: 14 BRPM | WEIGHT: 155 LBS | HEIGHT: 61 IN | DIASTOLIC BLOOD PRESSURE: 76 MMHG | SYSTOLIC BLOOD PRESSURE: 139 MMHG | HEART RATE: 71 BPM | OXYGEN SATURATION: 97 %

## 2022-04-21 DIAGNOSIS — M47.896 OTHER OSTEOARTHRITIS OF SPINE, LUMBAR REGION: ICD-10-CM

## 2022-04-21 DIAGNOSIS — M43.06 LUMBAR SPONDYLOLYSIS: ICD-10-CM

## 2022-04-21 DIAGNOSIS — M47.816 SPONDYLOSIS OF LUMBAR REGION WITHOUT MYELOPATHY OR RADICULOPATHY: Primary | ICD-10-CM

## 2022-04-21 DIAGNOSIS — G89.29 CHRONIC PAIN: ICD-10-CM

## 2022-04-21 PROCEDURE — 64635 PR DESTROY LUMB/SAC FACET JNT: ICD-10-PCS | Mod: LT,,, | Performed by: ANESTHESIOLOGY

## 2022-04-21 PROCEDURE — 25000003 PHARM REV CODE 250: Performed by: ANESTHESIOLOGY

## 2022-04-21 PROCEDURE — 64635 DESTROY LUMB/SAC FACET JNT: CPT | Mod: LT | Performed by: ANESTHESIOLOGY

## 2022-04-21 PROCEDURE — 64635 DESTROY LUMB/SAC FACET JNT: CPT | Mod: LT,,, | Performed by: ANESTHESIOLOGY

## 2022-04-21 PROCEDURE — 64636 DESTROY L/S FACET JNT ADDL: CPT | Mod: LT,,, | Performed by: ANESTHESIOLOGY

## 2022-04-21 PROCEDURE — 64636 DESTROY L/S FACET JNT ADDL: CPT | Mod: LT | Performed by: ANESTHESIOLOGY

## 2022-04-21 PROCEDURE — 64636 PR DESTROY L/S FACET JNT ADDL: ICD-10-PCS | Mod: LT,,, | Performed by: ANESTHESIOLOGY

## 2022-04-21 PROCEDURE — 63600175 PHARM REV CODE 636 W HCPCS: Performed by: ANESTHESIOLOGY

## 2022-04-21 RX ORDER — SODIUM CHLORIDE 9 MG/ML
500 INJECTION, SOLUTION INTRAVENOUS CONTINUOUS
Status: DISCONTINUED | OUTPATIENT
Start: 2022-04-21 | End: 2022-04-21 | Stop reason: HOSPADM

## 2022-04-21 RX ORDER — MIDAZOLAM HYDROCHLORIDE 1 MG/ML
INJECTION INTRAMUSCULAR; INTRAVENOUS
Status: DISCONTINUED | OUTPATIENT
Start: 2022-04-21 | End: 2022-04-21 | Stop reason: HOSPADM

## 2022-04-21 RX ORDER — FENTANYL CITRATE 50 UG/ML
INJECTION, SOLUTION INTRAMUSCULAR; INTRAVENOUS
Status: DISCONTINUED | OUTPATIENT
Start: 2022-04-21 | End: 2022-04-21 | Stop reason: HOSPADM

## 2022-04-21 RX ORDER — LIDOCAINE HYDROCHLORIDE 20 MG/ML
INJECTION, SOLUTION INFILTRATION; PERINEURAL
Status: DISCONTINUED | OUTPATIENT
Start: 2022-04-21 | End: 2022-04-21 | Stop reason: HOSPADM

## 2022-04-21 RX ORDER — DEXAMETHASONE SODIUM PHOSPHATE 10 MG/ML
INJECTION INTRAMUSCULAR; INTRAVENOUS
Status: DISCONTINUED | OUTPATIENT
Start: 2022-04-21 | End: 2022-04-21 | Stop reason: HOSPADM

## 2022-04-21 RX ORDER — BUPIVACAINE HYDROCHLORIDE 2.5 MG/ML
INJECTION, SOLUTION EPIDURAL; INFILTRATION; INTRACAUDAL
Status: DISCONTINUED | OUTPATIENT
Start: 2022-04-21 | End: 2022-04-21 | Stop reason: HOSPADM

## 2022-04-21 NOTE — H&P
HPI  Patient presenting for Procedure(s) (LRB):  Radiofrequency Ablation LEFT L3,L4,L5 (Left)     Patient on Anti-coagulation No    No health changes since previous encounter    Past Medical History:   Diagnosis Date    Acid reflux     Allergy     Anemia     Asthma     Coronary artery disease     CS (cervical spondylosis) 3/8/2013    Degenerative disc disease     Dry eyes     Dry mouth     Gastroparesis     History of methotrexate therapy 1/19/2022    Hyperlipidemia     Lateral meniscus derangement 4/6/2016    Lobular carcinoma in situ     Lumbar spondylosis 3/8/2013    Osteoarthritis     Osteoporosis     Rheumatoid arthritis(714.0)     Rupture of left triceps tendon 10/17/2018    Umbilical hernia 8/13/2015     Past Surgical History:   Procedure Laterality Date    BREAST BIOPSY Left 01/29/2002    core bx    CARPAL TUNNEL RELEASE Right 05/2017    CHOLECYSTECTOMY  2004    COLONOSCOPY      10/11    COLONOSCOPY N/A 6/29/2017    Procedure: COLONOSCOPY;  Surgeon: López Moore MD;  Location: [facility] ([address] [place] [address]);  Service: Endoscopy;  Laterality: N/A;    EPIDURAL STEROID INJECTION N/A 11/18/2021    Procedure: INJECTION, STEROID, EPIDURAL, L5-S1 IL NEED CONSENT;  Surgeon: Tj Mayfield MD;  Location: [facility] PAIN MGT;  Service: Pain Management;  Laterality: N/A;    INJECTION OF ANESTHETIC AGENT AROUND NERVE Bilateral 8/23/2021    Procedure: BLOCK, NERVE, MEDIAL BRANCH L3,L4,L5;  Surgeon: Tj Mayfield MD;  Location: [facility] PAIN MGT;  Service: Pain Management;  Laterality: Bilateral;  1 of 2    INJECTION OF ANESTHETIC AGENT AROUND NERVE Bilateral 8/26/2021    Procedure: BLOCK, NERVE, MEDIAL BRANCH L3,L4,L5;  Surgeon: Tj Mayfield MD;  Location: [facility] PAIN MGT;  Service: Pain Management;  Laterality: Bilateral;  2 of 2    INJECTION OF FACET JOINT Bilateral 3/12/2020    Procedure: FACET JOINT INJECTION (LUMBAR BLOCK) BILATERAL L4-5 AND L5-S1 DIRECT REFERRAL;  Surgeon: Tj Mayfield MD;  Location: [facility]  MGT;  Service: Pain Management;  Laterality: Bilateral;  NEEDS CONSENT    INJECTION OF FACET JOINT Bilateral 3/29/2021    Procedure: INJECTION, FACET JOINT L3/4, L4/5, L5/S1;  Surgeon: Tj Mayfield MD;  Location: [facility] PAIN MGT;  Service: Pain Management;  Laterality: Bilateral;    INJECTION OF JOINT Right 7/30/2020    Procedure: INJECTION, JOINT, RIGHT HIP Interarticular under flouro;  Surgeon: Tj Mayfield MD;  Location: [facility] PAIN MGT;  Service: Pain Management;  Laterality: Right;  INJECTION, JOINT, RIGHT HIP Interarticular under flouro    INJECTION OF JOINT Right 2/21/2022    Procedure: Injection, Joint RIGHT ISCHIAL BURSA;  Surgeon: Tj Mayfield MD;  Location: [facility] PAIN MGT;  Service: Pain Management;  Laterality: Right;    INTRAMEDULLARY RODDING OF FEMUR Left 5/21/2019    Procedure: INSERTION, INTRAMEDULLARY ARIEL, FEMUR;  Surgeon: López Duff MD;  Location: [facility] OR 2ND FLR;  Service: Orthopedics;  Laterality: Left;    RADIOFREQUENCY ABLATION Left 9/20/2021    Procedure: RADIOFREQUENCY ABLATION left L3,4,5 RFA  1 of 2  CONSENT NEEDED;  Surgeon: Tj Mayfield MD;  Location: [facility] PAIN MGT;  Service: Pain Management;  Laterality: Left;    RADIOFREQUENCY ABLATION Right 10/4/2021    Procedure: RADIOFREQUENCY ABLATION  right L3,4,5 RFA   2 of 2  CONSENT NEEDED;  Surgeon: Tj Mayfield MD;  Location: [facility] PAIN MGT;  Service: Pain Management;  Laterality: Right;    REPAIR OF TRICEPS TENDON Left 10/17/2018    Procedure: REPAIR, TENDON, TRICEPS left elbow;  Surgeon: Staci Yarbrough MD;  Location: [facility] OR 1ST FLR;  Service: Orthopedics;  Laterality: Left;  Anesthesia: General and regional. PRONE, k-wire , hand pan 1 and pan 2, CALL ARTHREX/Tamera notified 10-12 LO    TONSILLECTOMY      TRANSFORAMINAL EPIDURAL INJECTION OF STEROID Right 8/31/2020    Procedure: INJECTION, STEROID, EPIDURAL, TRANSFORAMINAL,  APPROACH, L3-L4 and L4-L5 need consent;  Surgeon: Tj Mayfield MD;  Location: [facility] PAIN MGT;   "Service: Pain Management;  Laterality: Right;    TRANSFORAMINAL EPIDURAL INJECTION OF STEROID Bilateral 7/23/2021    Procedure: INJECTION, STEROID, EPIDURAL, TRANSFORAMINAL APPROACH L4/5;  Surgeon: Tj Mayfield MD;  Location: [facility] PAIN MGT;  Service: Pain Management;  Laterality: Bilateral;    TRIGGER POINT INJECTION N/A 7/23/2021    Procedure: INJECTION, TRIGGER POINT SCAPULAR;  Surgeon: Tj Mayfield MD;  Location: [facility] PAIN MGT;  Service: Pain Management;  Laterality: N/A;    TUBAL LIGATION  2003    UPPER GASTROINTESTINAL ENDOSCOPY      10/11    uterine ablation  2003     Review of patient's allergies indicates:   Allergen Reactions    Actemra [tocilizumab] Other (See Comments)     Severe dizziness    Codeine Nausea And Vomiting    Gold au 198 Hives and Rash    Hydroxychloroquine Other (See Comments)     Can't remember the reaction      Iodinated contrast media Other (See Comments)     Other reaction(s): BURNING ALL OVER    Iodine Other (See Comments)     Other reaction(s): BURNING ALL OVER - Iodine dye - Not topical    Sulfa (sulfonamide antibiotics) Other (See Comments)     Can't remember the reaction    Zofran [ondansetron hcl (pf)] Nausea And Vomiting     Pt reports that last time she received zofran she started vomiting again    Methotrexate analogues Nausea Only    Pneumovax 23 [pneumococcal 23-argenis ps vaccine] Other (See Comments)     "sick"      No current facility-administered medications for this encounter.       PMHx, PSHx, Allergies, Medications reviewed in epic    ROS negative except pain complaints in HPI    OBJECTIVE:    /77 (BP Location: Right arm, Patient Position: Sitting)   Pulse 79   Temp 98.2 °F (36.8 °C) (Oral)   Resp 16   Ht 5' 1" (1.549 m)   Wt 70.3 kg (155 lb)   LMP  (LMP Unknown)   SpO2 98%   BMI 29.29 kg/m²     PHYSICAL EXAMINATION:    GENERAL: Well appearing, in no acute distress, alert and oriented x3.  PSYCH:  Mood and affect appropriate.  SKIN: Skin color, " texture, turgor normal, no rashes or lesions which will impact the procedure.  CV: RRR with palpation of the radial artery.  PULM: No evidence of respiratory difficulty, symmetric chest rise. Clear to auscultation.  NEURO: Cranial nerves grossly intact.    Plan:    Proceed with procedure as planned Procedure(s) (LRB):  Radiofrequency Ablation LEFT L3,L4,L5 (Left)    J Luis Liu  04/21/2022

## 2022-04-21 NOTE — DISCHARGE INSTRUCTIONS

## 2022-04-21 NOTE — DISCHARGE SUMMARY
Discharge Note  Short Stay      SUMMARY     Admit Date: 4/21/2022    Attending Physician: Tj Mayfield      Discharge Physician: Tj Mayfield      Discharge Date: 4/21/2022 10:23 AM    Procedure(s) (LRB):  Radiofrequency Ablation LEFT L3,L4,L5 (Left)    Final Diagnosis: Lumbar spondylosis [M47.816]    Disposition: Home or self care    Patient Instructions:   Current Discharge Medication List      CONTINUE these medications which have NOT CHANGED    Details   albuterol (PROVENTIL/VENTOLIN HFA) 90 mcg/actuation inhaler INHALE 2 PUFFS INTO THE LUNGS EVERY 6 (SIX) HOURS AS NEEDED. RESCUE  Qty: 20.1 g, Refills: 11      albuterol-ipratropium (DUO-NEB) 2.5 mg-0.5 mg/3 mL nebulizer solution Take 3 mLs by nebulization every 6 (six) hours as needed for Wheezing. Rescue  Qty: 90 mL, Refills: 3    Associated Diagnoses: Moderate persistent asthma with acute exacerbation; Bronchitis      aspirin 81 mg Tab Take 81 mg by mouth every morning.       budesonide-formoterol 160-4.5 mcg (SYMBICORT) 160-4.5 mcg/actuation HFAA INHALE 2 PUFFS INTO THE LUNGS EVERY 12 (TWELVE) HOURS.  Qty: 30.6 g, Refills: 3    Associated Diagnoses: Chronic asthma, mild intermittent, uncomplicated      calcium citrate-vitamin D3 315-200 mg (CITRACAL+D) 315 mg-5 mcg (200 unit) per tablet Take 1 tablet by mouth 2 (two) times daily.       conjugated estrogens (PREMARIN) vaginal cream insert 1 gram vaginally as directed  Qty: 30 g, Refills: 4      cycloSPORINE (RESTASIS) 0.05 % ophthalmic emulsion Instill one drop into both eyes two times per day  Qty: 60 each, Refills: 10      diclofenac sodium (VOLTAREN) 1 % Gel APPLY 2 GRAMS TOPICALLY 4 (FOUR) TIMES DAILY AS NEEDED.  Qty: 300 g, Refills: 2      erythromycin (ROMYCIN) ophthalmic ointment Place into the left eye 2 (two) times daily. Apply to affected external eye tissue  Qty: 3.5 g, Refills: 0    Associated Diagnoses: Blepharitis of left lower eyelid, unspecified type      estrogens, conjugated, (PREMARIN) 0.625  MG tablet take 1 tablet by mouth daily  Qty: 90 tablet, Refills: 4      fluconazole (DIFLUCAN) 100 MG tablet Take 1 tablet by mouth once daily for 14 days  Qty: 14 tablet, Refills: 0      halobetasol propion-tazarotene (DUOBRII) 0.01-0.045 % Lotn aaa on feet and hands qhs  then vaseline and warm towel  Qty: 100 g, Refills: 3    Associated Diagnoses: Psoriasiform dermatitis      INV PROPULSID 10 MG Take 1 tablets (20 mg) by mouth 4 (four) times daily. FOR INVESTIGATIONAL USE ONLY. Protocol [id number]  Qty: 1220 each, Refills: 0    Associated Diagnoses: Patient in clinical research study; Gastroparesis      ketoconazole (NIZORAL) 2 % cream Apply to feet twice daily for 3 weeks  Qty: 60 g, Refills: 3    Associated Diagnoses: Tinea pedis of both feet      leflunomide (ARAVA) 20 MG Tab TAKE 1 TABLET BY MOUTH EVERY DAY  Qty: 90 tablet, Refills: 0    Associated Diagnoses: Rheumatoid arthritis      lifitegrast (XIIDRA) 5 % Dpet INSTILL ONE DROP INTO BOTH EYES TWICE A DAY  Qty: 60 mL, Refills: 10      loteprednol (LOTEMAX) 0.5 % ophthalmic suspension Place 1 drop into both eyes 2 (two) times a day. for 7 days  Qty: 5 mL, Refills: 1    Associated Diagnoses: Irritation of both eyes; Bilateral dry eyes      methenamine (HIPREX) 1 gram Tab Take 1 tablet (1 g total) by mouth 2 (two) times daily.  Qty: 180 tablet, Refills: 3      mirabegron (MYRBETRIQ) 25 mg Tb24 ER tablet Take 1 tablet (25 mg total) by mouth once daily.  Qty: 30 tablet, Refills: 11      montelukast (SINGULAIR) 10 mg tablet TAKE 1 TABLET BY MOUTH EVERY DAY AT NIGHT  Qty: 90 tablet, Refills: 3    Associated Diagnoses: Perennial allergic rhinitis      naproxen (NAPROSYN) 500 MG tablet Take 1 tablet (500 mg total) by mouth 2 (two) times daily as needed.  Qty: 180 tablet, Refills: 0      omeprazole (PRILOSEC) 40 MG capsule Take 1 capsule (40 mg total) by mouth every morning.  Qty: 30 capsule, Refills: 6      omeprazole-sodium bicarbonate (ZEGERID) 40-1.1 mg-gram per  capsule TAKE 1 CAPSULE BY MOUTH EVERY DAY IN THE MORNING  Qty: 90 capsule, Refills: 3      POTASSIUM ORAL Take by mouth once daily.      predniSONE (DELTASONE) 5 MG tablet TAKE 1 TABLET BY MOUTH EVERY DAY  Qty: 90 tablet, Refills: 1      pregabalin (LYRICA) 50 MG capsule Take 1 capsule (50 mg total) by mouth every evening.  Qty: 90 capsule, Refills: 1    Associated Diagnoses: Fibromyalgia      !! progesterone (PROMETRIUM) 100 MG capsule Take 1 capsule by mouth daily.  Qty: 90 capsule, Refills: 1    Associated Diagnoses: Unspecified ovarian cyst, unspecified side; Other specified counseling      !! progesterone (PROMETRIUM) 100 MG capsule take 1 capsule by mouth daily  Qty: 90 capsule, Refills: 4      promethazine (PHENERGAN) 25 MG tablet Take 1 tablet (25 mg total) by mouth every 6 (six) hours as needed for Nausea.  Qty: 30 tablet, Refills: 1      rosuvastatin (CRESTOR) 20 MG tablet Take 1 tablet (20 mg total) by mouth every evening.  Qty: 90 tablet, Refills: 3    Associated Diagnoses: Coronary artery disease involving native coronary artery of native heart without angina pectoris      sarilumab (KEVZARA) 200 mg/1.14 mL Syrg Inject 1.14 mL (200 mg total) into the skin every 14 (fourteen) days.  Qty: 2.28 mL, Refills: 2    Associated Diagnoses: Rheumatoid arthritis, involving unspecified site, unspecified whether rheumatoid factor present      sucralfate (CARAFATE) 1 gram tablet Take 1 tablet (1 g total) by mouth 4 (four) times daily.  Qty: 360 tablet, Refills: 3    Associated Diagnoses: Gastroparesis      traMADoL (ULTRAM) 50 mg tablet TAKE 1 TABLET (50 MG TOTAL) BY MOUTH EVERY 12 (TWELVE) HOURS AS NEEDED FOR PAIN.  Qty: 60 tablet, Refills: 0    Comments: Not to exceed 5 additional fills before 02/07/2022  Associated Diagnoses: Chronic hip pain, left       !! - Potential duplicate medications found. Please discuss with provider.              Discharge Diagnosis: Lumbar spondylosis [M47.816]  Condition on Discharge:  Stable with no complications to procedure   Diet on Discharge: Same as before.  Activity: as per instruction sheet.  Discharge to: Home with a responsible adult.  Follow up: 2-4 weeks       Please call my office or pager at [phone] if experienced any weakness or loss of sensation, fever > 101.5, pain uncontrolled with oral medications, persistent nausea/vomiting/or diarrhea, redness or drainage from the incisions, or any other worrisome concerns. If physician on call was not reached or could not communicate with our office for any reason please go to the nearest emergency department

## 2022-04-21 NOTE — OP NOTE
Therapeutic Lumbar Medial Branch Radiofrequency Ablation under Fluoroscopy     The procedure, risks, benefits, and options were discussed with the patient. There are no contraindications to the procedure. The patent expressed understanding and agreed to the procedure. Informed written consent was obtained prior to the start of the procedure and can be found in the patient's chart.        PATIENT NAME: Joyce Peterson   MRN: [id number]     DATE OF PROCEDURE: 04/21/2022     PROCEDURE:  Left L3, L4 and L5 Lumbar Radiofrequency Ablation under Fluoroscopy    PRE-OP DIAGNOSIS: Lumbar spondylosis [M47.816] Lumbar spondylosis [M47.816]    POST-OP DIAGNOSIS: Same    PHYSICIAN: Tj Mayfield MD       MEDICATIONS INJECTED:  Preservative-free Decadron 10mg with 9cc of Bupivicaine 0.25%    LOCAL ANESTHETIC INJECTED:   Xylocaine 2%    SEDATION:   Versed 2mg and Fentanyl 75mcg                                                                                                                                                                                     Conscious sedation ordered by M.D. Patient re-evaluation prior to administration of conscious sedation. No changes noted in patient's status from initial evaluation. The patient's vital signs were monitored by RN and patient remained hemodynamically stable throughout the procedure.    Event Time In   Sedation Start 1007       ESTIMATED BLOOD LOSS:  None    COMPLICATIONS:  None     INTERVAL HISTORY: Patient has clinical and imaging findings suggestive of facet mediated pain. Patients has completed 2 previous diagnostic medial branch blocks at specified levels with at least 80% relief for the expected duration of the local anesthetic utilized.    TECHNIQUE: Time-out was performed to identify the patient and procedure to be performed. With the patient laying in a prone position, the surgical area was prepped and draped in the usual sterile fashion using ChloraPrep and fenestrated drape.  The levels were determined under fluoroscopic guidance. Skin anesthesia was achieved by injecting Lidocaine 2% over the injection sites. A 20 gauge 10mm curved active tip needle was introduced to the anatomic local of the medial branch at each of the above levels using AP, lateral and/or contralateral oblique fluoroscopic imaging. Then sensory and motor testing was performed to confirm that the needle tips were in the correct location. After negative aspiration for blood or CSF was confirmed, 1 mL of the lidocaine 2% listed above was injected slowly at each site. This was followed by thermal lesioning at 80 degrees celsius for 90 seconds. That was followed by slowly injecting 2 mL of the medication mixture listed above at each site. The needles were removed and bleeding was nil. A sterile dressing was applied. No specimens collected. The patient tolerated the procedure well and did not have any procedure related motor deficit at the conclusion of the procedure.    The patient was monitored after the procedure in the recovery area. They were given post-procedure and discharge instructions to follow at home. The patient was discharged in a stable condition.    PAIN BEFORE THE PROCEDURE: 6-9/10    PAIN AFTER THE PROCEDURE: 0/10      I reviewed and edited the fellow's note. I conducted my own interview and physical examination. I agree with the findings. I was present and supervising all critical portions of the procedure.    Tj Mayfield MD

## 2022-04-21 NOTE — H&P (VIEW-ONLY)
HPI  Patient presenting for Procedure(s) (LRB):  Radiofrequency Ablation LEFT L3,L4,L5 (Left)     Patient on Anti-coagulation No    No health changes since previous encounter    Past Medical History:   Diagnosis Date    Acid reflux     Allergy     Anemia     Asthma     Coronary artery disease     CS (cervical spondylosis) 3/8/2013    Degenerative disc disease     Dry eyes     Dry mouth     Gastroparesis     History of methotrexate therapy 1/19/2022    Hyperlipidemia     Lateral meniscus derangement 4/6/2016    Lobular carcinoma in situ     Lumbar spondylosis 3/8/2013    Osteoarthritis     Osteoporosis     Rheumatoid arthritis(714.0)     Rupture of left triceps tendon 10/17/2018    Umbilical hernia 8/13/2015     Past Surgical History:   Procedure Laterality Date    BREAST BIOPSY Left 01/29/2002    core bx    CARPAL TUNNEL RELEASE Right 05/2017    CHOLECYSTECTOMY  2004    COLONOSCOPY      10/11    COLONOSCOPY N/A 6/29/2017    Procedure: COLONOSCOPY;  Surgeon: López Moore MD;  Location: [facility] ([address] [place] [address]);  Service: Endoscopy;  Laterality: N/A;    EPIDURAL STEROID INJECTION N/A 11/18/2021    Procedure: INJECTION, STEROID, EPIDURAL, L5-S1 IL NEED CONSENT;  Surgeon: Tj Mayfield MD;  Location: [facility] PAIN MGT;  Service: Pain Management;  Laterality: N/A;    INJECTION OF ANESTHETIC AGENT AROUND NERVE Bilateral 8/23/2021    Procedure: BLOCK, NERVE, MEDIAL BRANCH L3,L4,L5;  Surgeon: Tj Mayfield MD;  Location: [facility] PAIN MGT;  Service: Pain Management;  Laterality: Bilateral;  1 of 2    INJECTION OF ANESTHETIC AGENT AROUND NERVE Bilateral 8/26/2021    Procedure: BLOCK, NERVE, MEDIAL BRANCH L3,L4,L5;  Surgeon: Tj Mayfield MD;  Location: [facility] PAIN MGT;  Service: Pain Management;  Laterality: Bilateral;  2 of 2    INJECTION OF FACET JOINT Bilateral 3/12/2020    Procedure: FACET JOINT INJECTION (LUMBAR BLOCK) BILATERAL L4-5 AND L5-S1 DIRECT REFERRAL;  Surgeon: Tj Mayfield MD;  Location: [facility]  MGT;  Service: Pain Management;  Laterality: Bilateral;  NEEDS CONSENT    INJECTION OF FACET JOINT Bilateral 3/29/2021    Procedure: INJECTION, FACET JOINT L3/4, L4/5, L5/S1;  Surgeon: Tj Mayfield MD;  Location: [facility] PAIN MGT;  Service: Pain Management;  Laterality: Bilateral;    INJECTION OF JOINT Right 7/30/2020    Procedure: INJECTION, JOINT, RIGHT HIP Interarticular under flouro;  Surgeon: Tj Mayfield MD;  Location: [facility] PAIN MGT;  Service: Pain Management;  Laterality: Right;  INJECTION, JOINT, RIGHT HIP Interarticular under flouro    INJECTION OF JOINT Right 2/21/2022    Procedure: Injection, Joint RIGHT ISCHIAL BURSA;  Surgeon: Tj Mayfield MD;  Location: [facility] PAIN MGT;  Service: Pain Management;  Laterality: Right;    INTRAMEDULLARY RODDING OF FEMUR Left 5/21/2019    Procedure: INSERTION, INTRAMEDULLARY ARIEL, FEMUR;  Surgeon: López Duff MD;  Location: [facility] OR 2ND FLR;  Service: Orthopedics;  Laterality: Left;    RADIOFREQUENCY ABLATION Left 9/20/2021    Procedure: RADIOFREQUENCY ABLATION left L3,4,5 RFA  1 of 2  CONSENT NEEDED;  Surgeon: Tj Mayfield MD;  Location: [facility] PAIN MGT;  Service: Pain Management;  Laterality: Left;    RADIOFREQUENCY ABLATION Right 10/4/2021    Procedure: RADIOFREQUENCY ABLATION  right L3,4,5 RFA   2 of 2  CONSENT NEEDED;  Surgeon: Tj Mayfield MD;  Location: [facility] PAIN MGT;  Service: Pain Management;  Laterality: Right;    REPAIR OF TRICEPS TENDON Left 10/17/2018    Procedure: REPAIR, TENDON, TRICEPS left elbow;  Surgeon: Staci Yarbrough MD;  Location: [facility] OR 1ST FLR;  Service: Orthopedics;  Laterality: Left;  Anesthesia: General and regional. PRONE, k-wire , hand pan 1 and pan 2, CALL ARTHREX/Tamera notified 10-12 LO    TONSILLECTOMY      TRANSFORAMINAL EPIDURAL INJECTION OF STEROID Right 8/31/2020    Procedure: INJECTION, STEROID, EPIDURAL, TRANSFORAMINAL,  APPROACH, L3-L4 and L4-L5 need consent;  Surgeon: Tj Mayfield MD;  Location: [facility] PAIN MGT;   "Service: Pain Management;  Laterality: Right;    TRANSFORAMINAL EPIDURAL INJECTION OF STEROID Bilateral 7/23/2021    Procedure: INJECTION, STEROID, EPIDURAL, TRANSFORAMINAL APPROACH L4/5;  Surgeon: Tj Mayfield MD;  Location: [facility] PAIN MGT;  Service: Pain Management;  Laterality: Bilateral;    TRIGGER POINT INJECTION N/A 7/23/2021    Procedure: INJECTION, TRIGGER POINT SCAPULAR;  Surgeon: Tj Mayfield MD;  Location: [facility] PAIN MGT;  Service: Pain Management;  Laterality: N/A;    TUBAL LIGATION  2003    UPPER GASTROINTESTINAL ENDOSCOPY      10/11    uterine ablation  2003     Review of patient's allergies indicates:   Allergen Reactions    Actemra [tocilizumab] Other (See Comments)     Severe dizziness    Codeine Nausea And Vomiting    Gold au 198 Hives and Rash    Hydroxychloroquine Other (See Comments)     Can't remember the reaction      Iodinated contrast media Other (See Comments)     Other reaction(s): BURNING ALL OVER    Iodine Other (See Comments)     Other reaction(s): BURNING ALL OVER - Iodine dye - Not topical    Sulfa (sulfonamide antibiotics) Other (See Comments)     Can't remember the reaction    Zofran [ondansetron hcl (pf)] Nausea And Vomiting     Pt reports that last time she received zofran she started vomiting again    Methotrexate analogues Nausea Only    Pneumovax 23 [pneumococcal 23-argenis ps vaccine] Other (See Comments)     "sick"      No current facility-administered medications for this encounter.       PMHx, PSHx, Allergies, Medications reviewed in epic    ROS negative except pain complaints in HPI    OBJECTIVE:    /77 (BP Location: Right arm, Patient Position: Sitting)   Pulse 79   Temp 98.2 °F (36.8 °C) (Oral)   Resp 16   Ht 5' 1" (1.549 m)   Wt 70.3 kg (155 lb)   LMP  (LMP Unknown)   SpO2 98%   BMI 29.29 kg/m²     PHYSICAL EXAMINATION:    GENERAL: Well appearing, in no acute distress, alert and oriented x3.  PSYCH:  Mood and affect appropriate.  SKIN: Skin color, " texture, turgor normal, no rashes or lesions which will impact the procedure.  CV: RRR with palpation of the radial artery.  PULM: No evidence of respiratory difficulty, symmetric chest rise. Clear to auscultation.  NEURO: Cranial nerves grossly intact.    Plan:    Proceed with procedure as planned Procedure(s) (LRB):  Radiofrequency Ablation LEFT L3,L4,L5 (Left)    J Luis Liu  04/21/2022

## 2022-04-25 ENCOUNTER — PATIENT MESSAGE (OUTPATIENT)
Dept: OPTOMETRY | Facility: CLINIC | Age: 62
End: 2022-04-25
Payer: MEDICARE

## 2022-04-25 ENCOUNTER — TELEPHONE (OUTPATIENT)
Dept: OPHTHALMOLOGY | Facility: CLINIC | Age: 62
End: 2022-04-25
Payer: MEDICARE

## 2022-05-03 ENCOUNTER — HOSPITAL ENCOUNTER (EMERGENCY)
Facility: HOSPITAL | Age: 62
Discharge: HOME | End: 2022-05-03
Attending: EMERGENCY MEDICINE
Payer: MEDICARE

## 2022-05-03 ENCOUNTER — PATIENT MESSAGE (OUTPATIENT)
Dept: ORTHOPEDICS | Facility: CLINIC | Age: 62
End: 2022-05-03
Payer: MEDICARE

## 2022-05-03 VITALS
TEMPERATURE: 98 F | BODY MASS INDEX: 29.07 KG/M2 | HEIGHT: 61 IN | DIASTOLIC BLOOD PRESSURE: 81 MMHG | HEART RATE: 77 BPM | WEIGHT: 154 LBS | OXYGEN SATURATION: 96 % | RESPIRATION RATE: 18 BRPM | SYSTOLIC BLOOD PRESSURE: 126 MMHG

## 2022-05-03 DIAGNOSIS — M25.552 LEFT HIP PAIN: ICD-10-CM

## 2022-05-03 DIAGNOSIS — M79.606 LEG PAIN: ICD-10-CM

## 2022-05-03 PROCEDURE — 25000003 PHARM REV CODE 250: Performed by: PHYSICIAN ASSISTANT

## 2022-05-03 PROCEDURE — 99283 EMERGENCY DEPT VISIT LOW MDM: CPT

## 2022-05-03 RX ORDER — OXYCODONE AND ACETAMINOPHEN 5; 325 MG/1; MG/1
1 TABLET ORAL EVERY 8 HOURS PRN
Qty: 8 TABLET | Refills: 0 | Status: SHIPPED | OUTPATIENT
Start: 2022-05-03 | End: 2022-05-03 | Stop reason: SDUPTHER

## 2022-05-03 RX ORDER — TRAMADOL HYDROCHLORIDE 50 MG/1
50 TABLET ORAL
Status: COMPLETED | OUTPATIENT
Start: 2022-05-03 | End: 2022-05-03

## 2022-05-03 RX ORDER — OXYCODONE AND ACETAMINOPHEN 5; 325 MG/1; MG/1
1 TABLET ORAL EVERY 8 HOURS PRN
Qty: 8 TABLET | Refills: 0 | Status: SHIPPED | OUTPATIENT
Start: 2022-05-03 | End: 2022-05-06

## 2022-05-03 RX ADMIN — TRAMADOL HYDROCHLORIDE 50 MG: 50 TABLET, COATED ORAL at 12:05

## 2022-05-03 NOTE — ED PROVIDER NOTES
"Encounter Date: 5/3/2022       History     Chief Complaint   Patient presents with    Hip Pain     Fell out of bed last pm. Landed on left side, hx surgery to L. Femur.      Patient reports hip pain after a slip and fall yesterday when her feet got caught in the sheets; pt has a history of hip surgery with hardware in 2019      Leg Pain   The incident occurred at home. The injury mechanism was a fall. The incident occurred yesterday. The pain is present in the left hip. The quality of the pain is described as sharp and aching. The pain is at a severity of 4/10. The pain has been constant since onset. Pertinent negatives include no loss of sensation and no tingling. She reports no foreign bodies present. The symptoms are aggravated by bearing weight and activity. Treatments tried: tramadol. The treatment provided mild relief.     Review of patient's allergies indicates:   Allergen Reactions    Actemra [tocilizumab] Other (See Comments)     Severe dizziness    Codeine Nausea And Vomiting and Hives    Gold au 198 Hives and Rash    Hydroxychloroquine Other (See Comments)     Can't remember the reaction      Iodinated contrast media Other (See Comments)     Other reaction(s): BURNING ALL OVER    Iodine Other (See Comments) and Hives     Other reaction(s): BURNING ALL OVER - Iodine dye - Not topical    Ondansetron Nausea And Vomiting    Sulfa (sulfonamide antibiotics) Other (See Comments)     Can't remember the reaction    Zofran [ondansetron hcl (pf)] Nausea And Vomiting     Pt reports that last time she received zofran she started vomiting again    Methotrexate analogues Nausea Only    Pneumococcal vaccine Other (See Comments)    Pneumovax 23 [pneumococcal 23-argenis ps vaccine] Other (See Comments)     "sick"    Methotrexate Nausea Only     Past Medical History:   Diagnosis Date    Acid reflux     Allergy     Anemia     Asthma     Coronary artery disease     CS (cervical spondylosis) 3/8/2013    " Degenerative disc disease     Dry eyes     Dry mouth     Gastroparesis     History of methotrexate therapy 1/19/2022    Hyperlipidemia     Lateral meniscus derangement 4/6/2016    Lobular carcinoma in situ     Lumbar spondylosis 3/8/2013    Osteoarthritis     Osteoporosis     Rheumatoid arthritis(714.0)     Rupture of left triceps tendon 10/17/2018    Umbilical hernia 8/13/2015     Past Surgical History:   Procedure Laterality Date    BREAST BIOPSY Left 01/29/2002    core bx    CARPAL TUNNEL RELEASE Right 05/2017    CHOLECYSTECTOMY  2004    COLONOSCOPY      10/11    COLONOSCOPY N/A 6/29/2017    Procedure: COLONOSCOPY;  Surgeon: López Moore MD;  [facility]  Service: Endoscopy;  Laterality: N/A;    EPIDURAL STEROID INJECTION N/A 11/18/2021    Procedure: INJECTION, STEROID, EPIDURAL, L5-S1 IL NEED CONSENT;  Surgeon: Tj Mayfield MD;  Location: [facility] PAIN MGT;  Service: Pain Management;  Laterality: N/A;    INJECTION OF ANESTHETIC AGENT AROUND NERVE Bilateral 8/23/2021    Procedure: BLOCK, NERVE, MEDIAL BRANCH L3,L4,L5;  Surgeon: Tj Mayfield MD;  Location: [facility] PAIN MGT;  Service: Pain Management;  Laterality: Bilateral;  1 of 2    INJECTION OF ANESTHETIC AGENT AROUND NERVE Bilateral 8/26/2021    Procedure: BLOCK, NERVE, MEDIAL BRANCH L3,L4,L5;  Surgeon: Tj Mayfield MD;  Location: [facility] PAIN MGT;  Service: Pain Management;  Laterality: Bilateral;  2 of 2    INJECTION OF FACET JOINT Bilateral 3/12/2020    Procedure: FACET JOINT INJECTION (LUMBAR BLOCK) BILATERAL L4-5 AND L5-S1 DIRECT REFERRAL;  Surgeon: Tj Mayfield MD;  Location: [facility] PAIN MGT;  Service: Pain Management;  Laterality: Bilateral;  NEEDS CONSENT    INJECTION OF FACET JOINT Bilateral 3/29/2021    Procedure: INJECTION, FACET JOINT L3/4, L4/5, L5/S1;  Surgeon: Tj Mayfield MD;  Location: [facility] PAIN MGT;  Service: Pain Management;  Laterality: Bilateral;    INJECTION OF JOINT Right 7/30/2020    Procedure:  INJECTION, JOINT, RIGHT HIP Interarticular under flouro;  Surgeon: Tj Mayfield MD;  Location: [facility] PAIN MGT;  Service: Pain Management;  Laterality: Right;  INJECTION, JOINT, RIGHT HIP Interarticular under flouro    INJECTION OF JOINT Right 2/21/2022    Procedure: Injection, Joint RIGHT ISCHIAL BURSA;  Surgeon: Tj Mayfield MD;  Location: [facility] PAIN MGT;  Service: Pain Management;  Laterality: Right;    INTRAMEDULLARY RODDING OF FEMUR Left 5/21/2019    Procedure: INSERTION, INTRAMEDULLARY ARIEL, FEMUR;  Surgeon: López Duff MD;  Location: [facility] OR 2ND FLR;  Service: Orthopedics;  Laterality: Left;    RADIOFREQUENCY ABLATION Left 9/20/2021    Procedure: RADIOFREQUENCY ABLATION left L3,4,5 RFA  1 of 2  CONSENT NEEDED;  Surgeon: Tj Mayfield MD;  Location: [facility] PAIN MGT;  Service: Pain Management;  Laterality: Left;    RADIOFREQUENCY ABLATION Right 10/4/2021    Procedure: RADIOFREQUENCY ABLATION  right L3,4,5 RFA   2 of 2  CONSENT NEEDED;  Surgeon: Tj Mayfield MD;  Location: [facility] PAIN MGT;  Service: Pain Management;  Laterality: Right;    RADIOFREQUENCY ABLATION Left 4/21/2022    Procedure: Radiofrequency Ablation LEFT L3,L4,L5;  Surgeon: Tj Mayfield MD;  Location: [facility] PAIN MGT;  Service: Pain Management;  Laterality: Left;    REPAIR OF TRICEPS TENDON Left 10/17/2018    Procedure: REPAIR, TENDON, TRICEPS left elbow;  Surgeon: Staci Yarbrough MD;  Location: [facility] OR 1ST FLR;  Service: Orthopedics;  Laterality: Left;  Anesthesia: General and regional. PRONE, k-wire , hand pan 1 and pan 2, CALL ARTHREX/Tamera notified 10-12 LO    TONSILLECTOMY      TRANSFORAMINAL EPIDURAL INJECTION OF STEROID Right 8/31/2020    Procedure: INJECTION, STEROID, EPIDURAL, TRANSFORAMINAL,  APPROACH, L3-L4 and L4-L5 need consent;  Surgeon: Tj Mayfield MD;  Location: [facility] PAIN MGT;  Service: Pain Management;  Laterality: Right;    TRANSFORAMINAL EPIDURAL INJECTION OF STEROID Bilateral 7/23/2021    Procedure:  INJECTION, STEROID, EPIDURAL, TRANSFORAMINAL APPROACH L4/5;  Surgeon: Tj Mayfield MD;  Location: [facility] PAIN MGT;  Service: Pain Management;  Laterality: Bilateral;    TRIGGER POINT INJECTION N/A 7/23/2021    Procedure: INJECTION, TRIGGER POINT SCAPULAR;  Surgeon: Tj Mayfield MD;  Location: [facility] PAIN MGT;  Service: Pain Management;  Laterality: N/A;    TUBAL LIGATION  2003    UPPER GASTROINTESTINAL ENDOSCOPY      10/11    uterine ablation  2003     Family History   Problem Relation Age of Onset    Cancer Mother         Lung Cancer    Emphysema Mother     Heart attack Mother     Alcohol abuse Mother     Cancer Father         Lung Cancer    Osteoarthritis Father     Lung cancer Father     Skin cancer Father     Alcohol abuse Father     Heart disease Brother     Heart attack Brother     Alcohol abuse Brother     Cirrhosis Brother     Osteoarthritis Paternal Aunt     Retinal detachment Maternal Aunt     No Known Problems Sister     No Known Problems Maternal Uncle     No Known Problems Paternal Uncle     No Known Problems Maternal Grandmother     No Known Problems Maternal Grandfather     No Known Problems Paternal Grandmother     No Known Problems Paternal Grandfather     Colon cancer Neg Hx     Esophageal cancer Neg Hx     Stomach cancer Neg Hx     Celiac disease Neg Hx     Diabetes Neg Hx     Thyroid disease Neg Hx     Amblyopia Neg Hx     Blindness Neg Hx     Cataracts Neg Hx     Glaucoma Neg Hx     Hypertension Neg Hx     Macular degeneration Neg Hx     Strabismus Neg Hx     Stroke Neg Hx      Social History     Tobacco Use    Smoking status: Never Smoker    Smokeless tobacco: Never Used   Substance Use Topics    Alcohol use: Yes     Comment: 2-3 times per year.    Drug use: No     Review of Systems   Constitutional: Negative for fever.   HENT: Negative for sore throat.    Respiratory: Negative for shortness of breath.    Cardiovascular: Negative for chest pain.    Gastrointestinal: Negative for nausea.   Genitourinary: Negative for dysuria.   Musculoskeletal: Negative for back pain.   Skin: Negative for rash.   Neurological: Negative for tingling and weakness.   Hematological: Does not bruise/bleed easily.       Physical Exam     Initial Vitals [05/03/22 1044]   BP Pulse Resp Temp SpO2   (!) 141/80 85 17 97.7 °F (36.5 °C) 96 %      MAP       --         Physical Exam    Constitutional: She appears well-developed and well-nourished.   HENT:   Head: Normocephalic and atraumatic.   Eyes: EOM are normal.   Neck: Neck supple.   Normal range of motion.  Cardiovascular: Normal rate and regular rhythm.   Pulmonary/Chest: Breath sounds normal.   Abdominal: Abdomen is soft. Bowel sounds are normal.   Musculoskeletal:      Cervical back: Normal range of motion and neck supple.      Left hip: Tenderness present. No deformity. Decreased range of motion.      Comments: Pulses equal and strong at posterior tib; no bruising noted to the anterior leg     Neurological: She is alert and oriented to person, place, and time.   Skin: Skin is warm and dry.   Psychiatric: She has a normal mood and affect. Her behavior is normal. Judgment and thought content normal.         ED Course   Procedures  Labs Reviewed - No data to display       Imaging Results          X-Ray Femur Ap/Lat Left (Final result)  Result time 05/03/22 14:36:53    Final result by Sarbjit Madden MD (05/03/22 14:36:53)                 Impression:      No acute osseous abnormality of the left femur.  Stable postsurgical changes of the left femur without radiographic evidence of hardware-related complication.      Electronically signed by: Sarbjit Madden  Date:    05/03/2022  Time:    14:36             Narrative:    EXAMINATION:  XR FEMUR 2 VIEW LEFT    CLINICAL HISTORY:  Pain in leg, unspecified    TECHNIQUE:  AP and lateral views of the left femur.    COMPARISON:  Left femur radiographs 08/13/2019.    FINDINGS:  No acute fracture or  malalignment is identified of the left femur.  Stable left femoral implant without evidence of hardware loosening or fracture.  The left hip and left knee articulations are congruent on the provided views. There is no aggressive-appearing bone lesion or abnormal periosteal reaction.  Stable heterotopic ossification about the left hip/gluteal region.  Otherwise, no soft tissue gas or calcification. Bone mineralization is mildly diminished.                               X-Ray Hip 2 or 3 views Left (with Pelvis when performed) (Final result)  Result time 05/03/22 13:23:08    Final result by Johnny Casanova MD (05/03/22 13:23:08)                 Impression:      No acute osseous abnormality identified.      Electronically signed by: Johnny Casanova  Date:    05/03/2022  Time:    13:23             Narrative:    EXAMINATION:  Pelvis and left hip    CLINICAL HISTORY:  Pain.    COMPARISON:  September 29, 2021    FINDINGS:  Osseous and articular structures are unremarkable.  Left femoral neck compression screw and femoral intramedullary joe.  No acute fracture or dislocation identified                               X-Ray Pelvis Complete min 3 views (Final result)  Result time 05/03/22 13:20:14    Final result by Jose L Negrete MD (05/03/22 13:20:14)                 Impression:      No acute abnormalities are demonstrated      Electronically signed by: Jose L Negrete MD  Date:    05/03/2022  Time:    13:20             Narrative:    EXAMINATION:  XR PELVIS COMPLETE MIN 3 VIEWS    CLINICAL HISTORY:  Pain in left hip    COMPARISON:  None    FINDINGS:  There is a compression screw in the left hip.  No acute fractures are seen.  Femoral heads are not dislocated.  There is prominent lateral margins of the acetabulum bilaterally.                                 Medications   traMADoL tablet 50 mg (50 mg Oral Given 5/3/22 1240)           APC / Resident Notes:   Kedar BENAVIDEZ MD, did perform a face to face evaluation of this  patient initially seen by our midlevel.  Did perform a history and physical, did direct the evaluation and treatment plan and do agree with the documentation as written.                 Clinical Impression:   Final diagnoses:  [M25.552] Left hip pain  [M79.606] Leg pain - pain in anterior femur          ED Disposition Condition    Discharge Stable        ED Prescriptions     Medication Sig Dispense Start Date End Date Auth. Provider    oxyCODONE-acetaminophen (PERCOCET) 5-325 mg per tablet Take 1 tablet by mouth every 8 (eight) hours as needed for Pain. 8 tablet 5/3/2022 5/6/2022 LU Tirado        Follow-up Information     Follow up With Specialties Details Why Contact Info    discharge info    Discussed all pertinent ED information, results, diagnosis and treatment plan; All questions and concerns were addressed at this time. Patient voices understanding of information and instructions. Patient is comfortable with plan and discharge    Krystal Singleton DO Family Medicine   [address] [place] [id number]  [phone]             LU Tirado  05/03/22 1531       Kedar White MD  05/04/22 0574

## 2022-05-03 NOTE — DISCHARGE INSTRUCTIONS
..Followup with your PCP within x5 days, or return to ER if your symptoms do not improve or worsen

## 2022-05-05 NOTE — PROGRESS NOTES
LIMITED ACCESS PROTOCOL FOR THE USE OF ORAL CISAPRIDE IN THE TREATMENT OF REFRACTORY GASTROESOPHAGEAL REFLUX DISEASE AND OTHER GASTROINTESTINAL MOTILITY DISORDERS - OMKAR MOORE MD  IRB #: [id number]   -  OMKAR MOORE MD     SubjID [id number]     Mr/Ms. Joyce Peterson was seen in the clinic for her 6 month follow-up visit. She stated that she is still receiving therapeutic benefit from Cisapride. She denied any new questions regarding the study or her participation and verbalized her willingness to continue.    Her lab work & EKG were completed on 13Dec2021 and reviewed by  prior to the visit.     Dr. Moore performed a physical exam and reviewed her chart since the last study contact and determined she is eligible to continue Cisapride. Her visit paperwork and drug request form was sent to the appropriate study contact on 13Dec2021    Study staff at this visit was Lynette Angulo [facility]

## 2022-05-09 ENCOUNTER — PATIENT MESSAGE (OUTPATIENT)
Dept: GASTROENTEROLOGY | Facility: CLINIC | Age: 62
End: 2022-05-09
Payer: MEDICARE

## 2022-05-09 ENCOUNTER — SPECIALTY PHARMACY (OUTPATIENT)
Dept: PHARMACY | Facility: CLINIC | Age: 62
End: 2022-05-09
Payer: MEDICARE

## 2022-05-09 ENCOUNTER — TELEPHONE (OUTPATIENT)
Dept: OPHTHALMOLOGY | Facility: CLINIC | Age: 62
End: 2022-05-09
Payer: MEDICARE

## 2022-05-09 ENCOUNTER — PATIENT MESSAGE (OUTPATIENT)
Dept: OPTOMETRY | Facility: CLINIC | Age: 62
End: 2022-05-09
Payer: MEDICARE

## 2022-05-09 DIAGNOSIS — M05.79 RHEUMATOID ARTHRITIS INVOLVING MULTIPLE SITES WITH POSITIVE RHEUMATOID FACTOR: Primary | ICD-10-CM

## 2022-05-09 RX ORDER — FLUCONAZOLE 100 MG/1
TABLET ORAL
Qty: 14 TABLET | Refills: 0 | Status: SHIPPED | OUTPATIENT
Start: 2022-05-09 | End: 2022-05-26

## 2022-05-09 NOTE — TELEPHONE ENCOUNTER
Specialty Pharmacy - Refill Coordination    Specialty Medication Orders Linked to Encounter    Flowsheet Row Most Recent Value   Medication #1 sarilumab (KEVZARA) 200 mg/1.14 mL Syrg (Order#[id number], Rx#[id number])          Refill Questions - Documented Responses    Flowsheet Row Most Recent Value   Patient Availability and HIPAA Verification    Does patient want to proceed with activity? Yes   HIPAA/medical authority confirmed? Yes   Relationship to patient of person spoken to? Self   Refill Screening Questions    Changes to allergies? No   Changes to medications? No   New conditions since last clinic visit? No   Unplanned office visit, urgent care, ED, or hospital admission in the last 4 weeks? Yes  [as charted. pt states she accidenally fell out of bed and got checke dout just incase. She reports no issues.]   How does patient/caregiver feel medication is working? Good   Financial problems or insurance changes? No   How many doses of your specialty medications were missed in the last 4 weeks? 0   Would patient like to speak to a pharmacist? No   When does the patient need to receive the medication? 05/16/22   Refill Delivery Questions    How will the patient receive the medication? Delivery Jaymie   When does the patient need to receive the medication? 05/16/22   Shipping Address Home   Address in [facility] confirmed and updated if neccessary? Yes   Expected Copay ($) 55   Is the patient able to afford the medication copay? Yes   Payment Method CC on file   Days supply of Refill 28   Supplies needed? No supplies needed   Refill activity completed? Yes   Refill activity plan Refill scheduled   Shipment/Pickup Date: 05/11/22          Current Outpatient Medications   Medication Sig    albuterol (PROVENTIL/VENTOLIN HFA) 90 mcg/actuation inhaler INHALE 2 PUFFS INTO THE LUNGS EVERY 6 (SIX) HOURS AS NEEDED. RESCUE    albuterol-ipratropium (DUO-NEB) 2.5 mg-0.5 mg/3 mL nebulizer solution Take 3 mLs by  nebulization every 6 (six) hours as needed for Wheezing. Rescue    aspirin 81 mg Tab Take 81 mg by mouth every morning.     budesonide-formoterol 160-4.5 mcg (SYMBICORT) 160-4.5 mcg/actuation HFAA INHALE 2 PUFFS INTO THE LUNGS EVERY 12 (TWELVE) HOURS.    calcium citrate-vitamin D3 315-200 mg (CITRACAL+D) 315 mg-5 mcg (200 unit) per tablet Take 1 tablet by mouth 2 (two) times daily.     cycloSPORINE (RESTASIS) 0.05 % ophthalmic emulsion Instill one drop into both eyes two times per day    diclofenac sodium (VOLTAREN) 1 % Gel APPLY 2 GRAMS TOPICALLY 4 (FOUR) TIMES DAILY AS NEEDED.    erythromycin (ROMYCIN) ophthalmic ointment Place into the left eye 2 (two) times daily. Apply to affected external eye tissue    estrogens, conjugated, (PREMARIN) 0.625 MG tablet take 1 tablet by mouth daily    fluconazole (DIFLUCAN) 100 MG tablet Take 1 tablet by mouth once daily for 14 days    halobetasol propion-tazarotene (DUOBRII) 0.01-0.045 % Lotn aaa on feet and hands qhs  then vaseline and warm towel    INV PROPULSID 10 MG Take 1 tablets (20 mg) by mouth 4 (four) times daily. FOR INVESTIGATIONAL USE ONLY. Protocol [id number]    ketoconazole (NIZORAL) 2 % cream Apply to feet twice daily for 3 weeks (Patient taking differently: Apply to feet twice daily for 3 weeks)    leflunomide (ARAVA) 20 MG Tab TAKE 1 TABLET BY MOUTH EVERY DAY    lifitegrast (XIIDRA) 5 % Dpet INSTILL ONE DROP INTO BOTH EYES TWICE A DAY (Patient taking differently: INSTILL ONE DROP INTO BOTH EYES TWICE A DAY)    loteprednol (LOTEMAX) 0.5 % ophthalmic suspension Place 1 drop into both eyes 2 (two) times a day. for 7 days    mirabegron (MYRBETRIQ) 25 mg Tb24 ER tablet Take 1 tablet (25 mg total) by mouth once daily.    montelukast (SINGULAIR) 10 mg tablet TAKE 1 TABLET BY MOUTH EVERY DAY AT NIGHT    naproxen (NAPROSYN) 500 MG tablet Take 1 tablet (500 mg total) by mouth 2 (two) times daily as needed.    omeprazole (PRILOSEC) 40 MG capsule Take 1  capsule (40 mg total) by mouth every morning.    omeprazole-sodium bicarbonate (ZEGERID) 40-1.1 mg-gram per capsule TAKE 1 CAPSULE BY MOUTH EVERY DAY IN THE MORNING    POTASSIUM ORAL Take by mouth once daily.    predniSONE (DELTASONE) 5 MG tablet TAKE 1 TABLET BY MOUTH EVERY DAY    pregabalin (LYRICA) 50 MG capsule Take 1 capsule (50 mg total) by mouth every evening.    PREMARIN vaginal cream PLACE 0.5 GRAM VAGINALLY 3 (THREE) TIMES A WEEK.    progesterone (PROMETRIUM) 100 MG capsule Take 1 capsule by mouth daily.    progesterone (PROMETRIUM) 100 MG capsule take 1 capsule by mouth daily    promethazine (PHENERGAN) 25 MG tablet Take 1 tablet (25 mg total) by mouth every 6 (six) hours as needed for Nausea.    rosuvastatin (CRESTOR) 20 MG tablet Take 1 tablet (20 mg total) by mouth every evening.    sarilumab (KEVZARA) 200 mg/1.14 mL Syrg Inject 1.14 mL (200 mg total) into the skin every 14 (fourteen) days.    sucralfate (CARAFATE) 1 gram tablet Take 1 tablet (1 g total) by mouth 4 (four) times daily.    traMADoL (ULTRAM) 50 mg tablet TAKE 1 TABLET (50 MG TOTAL) BY MOUTH EVERY 12 (TWELVE) HOURS AS NEEDED FOR PAIN.   Last reviewed on 5/3/2022 11:43 AM by Laya Bello RN    Review of patient's allergies indicates:   Allergen Reactions    Actemra [tocilizumab] Other (See Comments)     Severe dizziness    Codeine Nausea And Vomiting and Hives    Gold au 198 Hives and Rash    Hydroxychloroquine Other (See Comments)     Can't remember the reaction      Iodinated contrast media Other (See Comments)     Other reaction(s): BURNING ALL OVER    Iodine Other (See Comments) and Hives     Other reaction(s): BURNING ALL OVER - Iodine dye - Not topical    Ondansetron Nausea And Vomiting    Sulfa (sulfonamide antibiotics) Other (See Comments)     Can't remember the reaction    Zofran [ondansetron hcl (pf)] Nausea And Vomiting     Pt reports that last time she received zofran she started vomiting again     "Methotrexate analogues Nausea Only    Pneumococcal vaccine Other (See Comments)    Pneumovax 23 [pneumococcal 23-argenis ps vaccine] Other (See Comments)     "sick"    Methotrexate Nausea Only    Last reviewed on  5/3/2022 10:47 AM by Betty Aceves      Tasks added this encounter   6/6/2022 - Refill Call (Auto Added)   Tasks due within next 3 months   No tasks due.     Amberly Dominguez, PharmD  [facility]  [address] [place] [id number]  Phone: [phone]  Fax: [phone]      "

## 2022-05-10 ENCOUNTER — PATIENT OUTREACH (OUTPATIENT)
Dept: ADMINISTRATIVE | Facility: OTHER | Age: 62
End: 2022-05-10
Payer: MEDICARE

## 2022-05-11 ENCOUNTER — OFFICE VISIT (OUTPATIENT)
Dept: DERMATOLOGY | Facility: CLINIC | Age: 62
End: 2022-05-11
Payer: MEDICARE

## 2022-05-11 DIAGNOSIS — L21.9 SEBORRHEIC DERMATITIS, UNSPECIFIED: ICD-10-CM

## 2022-05-11 DIAGNOSIS — B35.3 TINEA PEDIS OF BOTH FEET: ICD-10-CM

## 2022-05-11 DIAGNOSIS — Q82.8 KERATODERMA: Primary | ICD-10-CM

## 2022-05-11 DIAGNOSIS — L72.0 MILIA: ICD-10-CM

## 2022-05-11 PROCEDURE — 3044F HG A1C LEVEL LT 7.0%: CPT | Mod: CPTII,S$GLB,, | Performed by: DERMATOLOGY

## 2022-05-11 PROCEDURE — 99214 PR OFFICE/OUTPT VISIT, EST, LEVL IV, 30-39 MIN: ICD-10-PCS | Mod: S$GLB,,, | Performed by: DERMATOLOGY

## 2022-05-11 PROCEDURE — 99999 PR PBB SHADOW E&M-EST. PATIENT-LVL IV: ICD-10-PCS | Mod: PBBFAC,,, | Performed by: DERMATOLOGY

## 2022-05-11 PROCEDURE — 3044F PR MOST RECENT HEMOGLOBIN A1C LEVEL <7.0%: ICD-10-PCS | Mod: CPTII,S$GLB,, | Performed by: DERMATOLOGY

## 2022-05-11 PROCEDURE — 1159F MED LIST DOCD IN RCRD: CPT | Mod: CPTII,S$GLB,, | Performed by: DERMATOLOGY

## 2022-05-11 PROCEDURE — 99999 PR PBB SHADOW E&M-EST. PATIENT-LVL IV: CPT | Mod: PBBFAC,,, | Performed by: DERMATOLOGY

## 2022-05-11 PROCEDURE — 1160F RVW MEDS BY RX/DR IN RCRD: CPT | Mod: CPTII,S$GLB,, | Performed by: DERMATOLOGY

## 2022-05-11 PROCEDURE — 1159F PR MEDICATION LIST DOCUMENTED IN MEDICAL RECORD: ICD-10-PCS | Mod: CPTII,S$GLB,, | Performed by: DERMATOLOGY

## 2022-05-11 PROCEDURE — 1160F PR REVIEW ALL MEDS BY PRESCRIBER/CLIN PHARMACIST DOCUMENTED: ICD-10-PCS | Mod: CPTII,S$GLB,, | Performed by: DERMATOLOGY

## 2022-05-11 PROCEDURE — 99214 OFFICE O/P EST MOD 30 MIN: CPT | Mod: S$GLB,,, | Performed by: DERMATOLOGY

## 2022-05-11 NOTE — PATIENT INSTRUCTIONS
Stop the duobrii    Use vaseline then sock nightly for fissures  Use Resta Cream or Cetaphil rough and bumpy to feet nightly for thick scale  Use ketoconazole cream to eyebrows nighty and use it on feet it itching occurs or new scaling   Urea cream also can help remove scaling- use at night after bath or shower

## 2022-05-11 NOTE — PROGRESS NOTES
Subjective:       Patient ID:  Joyce Peterson is a 61 y.o. female who presents for   Chief Complaint   Patient presents with    Tinea Pedis     Pt here today for a follow up on Tinea pedis of both feet/Keratoderma tx with ketoconazole cream. Tx helps. Pt KOH positive on biopsy.    Pt feels her hands are better as well  She uses duobrii and her skin flares worse  Pt does use urea cream on hands and feet which helps.     C/o scaling/rash  on the brows.  Trickles down to eye and eye then becomes inflamed and itchy. ; feels she is scaly and red.     Pt is on chronic prednisone    C/o new white lesions on eyelids.  Getting larger. No tx.     Tinea Pedis        Review of Systems   Skin: Positive for itching, rash and dry skin.        Objective:    Physical Exam   Constitutional: She appears well-developed and well-nourished. No distress.   Neurological: She is alert and oriented to person, place, and time. She is not disoriented.   Psychiatric: She has a normal mood and affect.   Skin:   Areas Examined (abnormalities noted in diagram):   RUE Inspected  LUE Inspection Performed  RLE Inspected  LLE Inspection Performed  Nails and Digits Inspection Performed                      Diagram Legend     Erythematous scaling macule/papule c/w actinic keratosis       Vascular papule c/w angioma      Pigmented verrucoid papule/plaque c/w seborrheic keratosis      Yellow umbilicated papule c/w sebaceous hyperplasia      Irregularly shaped tan macule c/w lentigo     1-2 mm smooth white papules consistent with Milia      Movable subcutaneous cyst with punctum c/w epidermal inclusion cyst      Subcutaneous movable cyst c/w pilar cyst      Firm pink to brown papule c/w dermatofibroma      Pedunculated fleshy papule(s) c/w skin tag(s)      Evenly pigmented macule c/w junctional nevus     Mildly variegated pigmented, slightly irregular-bordered macule c/w mildly atypical nevus      Flesh colored to evenly pigmented papule c/w  intradermal nevus       Pink pearly papule/plaque c/w basal cell carcinoma      Erythematous hyperkeratotic cursted plaque c/w SCC      Surgical scar with no sign of skin cancer recurrence      Open and closed comedones      Inflammatory papules and pustules      Verrucoid papule consistent consistent with wart     Erythematous eczematous patches and plaques     Dystrophic onycholytic nail with subungual debris c/w onychomycosis     Umbilicated papule    Erythematous-base heme-crusted tan verrucoid plaque consistent with inflamed seborrheic keratosis     Erythematous Silvery Scaling Plaque c/w Psoriasis     See annotation      Assessment / Plan:        Keratoderma  Use vaseline then sock nightly for fissures  Use Resta Cream or Cetaphil rough and bumpy to feet nightly for thick scale  Urea cream also can help remove scaling- use at night after bath or shower- cetaphil rough and bumpy    Tinea pedis of both feet  Stop the duobrii    Use ketoconazole cream to eyebrows nighty and use it on feet it itching occurs or new scaling     Seborrheic dermatitis, unspecified  Use ketoconazole cream to eyebrows nighty and use it on feet it itching occurs or new scaling   Ocusoft wipes to eyelids     Milia..  Reassurance given to patient. No treatment is necessary.   Treatment of benign, asymptomatic lesions may be considered cosmetic.             Follow up in about 3 months (around 8/11/2022) for TBSE.

## 2022-05-12 ENCOUNTER — HOSPITAL ENCOUNTER (OUTPATIENT)
Facility: OTHER | Age: 62
Discharge: HOME | End: 2022-05-12
Attending: ANESTHESIOLOGY | Admitting: ANESTHESIOLOGY
Payer: MEDICARE

## 2022-05-12 VITALS
DIASTOLIC BLOOD PRESSURE: 71 MMHG | OXYGEN SATURATION: 99 % | SYSTOLIC BLOOD PRESSURE: 145 MMHG | RESPIRATION RATE: 16 BRPM | WEIGHT: 154 LBS | HEIGHT: 61 IN | HEART RATE: 80 BPM | TEMPERATURE: 98 F | BODY MASS INDEX: 29.07 KG/M2

## 2022-05-12 DIAGNOSIS — G89.29 CHRONIC PAIN: ICD-10-CM

## 2022-05-12 DIAGNOSIS — M47.9 OSTEOARTHRITIS OF SPINE, UNSPECIFIED SPINAL OSTEOARTHRITIS COMPLICATION STATUS, UNSPECIFIED SPINAL REGION: Primary | ICD-10-CM

## 2022-05-12 DIAGNOSIS — M47.819 SPONDYLOSIS WITHOUT MYELOPATHY OR RADICULOPATHY: ICD-10-CM

## 2022-05-12 PROCEDURE — 64636 DESTROY L/S FACET JNT ADDL: CPT | Mod: RT | Performed by: ANESTHESIOLOGY

## 2022-05-12 PROCEDURE — 25000003 PHARM REV CODE 250: Performed by: STUDENT IN AN ORGANIZED HEALTH CARE EDUCATION/TRAINING PROGRAM

## 2022-05-12 PROCEDURE — 64635 PR DESTROY LUMB/SAC FACET JNT: ICD-10-PCS | Mod: RT,,, | Performed by: ANESTHESIOLOGY

## 2022-05-12 PROCEDURE — 64636 DESTROY L/S FACET JNT ADDL: CPT | Mod: RT,,, | Performed by: ANESTHESIOLOGY

## 2022-05-12 PROCEDURE — 64635 DESTROY LUMB/SAC FACET JNT: CPT | Mod: RT,,, | Performed by: ANESTHESIOLOGY

## 2022-05-12 PROCEDURE — 64635 DESTROY LUMB/SAC FACET JNT: CPT | Mod: RT | Performed by: ANESTHESIOLOGY

## 2022-05-12 PROCEDURE — 63600175 PHARM REV CODE 636 W HCPCS: Performed by: ANESTHESIOLOGY

## 2022-05-12 PROCEDURE — 64636 PR DESTROY L/S FACET JNT ADDL: ICD-10-PCS | Mod: RT,,, | Performed by: ANESTHESIOLOGY

## 2022-05-12 PROCEDURE — 25000003 PHARM REV CODE 250: Performed by: ANESTHESIOLOGY

## 2022-05-12 RX ORDER — SODIUM CHLORIDE 9 MG/ML
500 INJECTION, SOLUTION INTRAVENOUS CONTINUOUS
Status: ACTIVE | OUTPATIENT
Start: 2022-05-12 | End: 2022-05-13

## 2022-05-12 RX ORDER — LIDOCAINE HYDROCHLORIDE 20 MG/ML
INJECTION, SOLUTION INFILTRATION; PERINEURAL
Status: DISCONTINUED | OUTPATIENT
Start: 2022-05-12 | End: 2022-05-12 | Stop reason: HOSPADM

## 2022-05-12 RX ORDER — BUPIVACAINE HYDROCHLORIDE 2.5 MG/ML
INJECTION, SOLUTION EPIDURAL; INFILTRATION; INTRACAUDAL
Status: DISCONTINUED | OUTPATIENT
Start: 2022-05-12 | End: 2022-05-12 | Stop reason: HOSPADM

## 2022-05-12 RX ORDER — MIDAZOLAM HYDROCHLORIDE 1 MG/ML
INJECTION INTRAMUSCULAR; INTRAVENOUS
Status: DISCONTINUED | OUTPATIENT
Start: 2022-05-12 | End: 2022-05-12 | Stop reason: HOSPADM

## 2022-05-12 RX ORDER — FENTANYL CITRATE 50 UG/ML
INJECTION, SOLUTION INTRAMUSCULAR; INTRAVENOUS
Status: DISCONTINUED | OUTPATIENT
Start: 2022-05-12 | End: 2022-05-12 | Stop reason: HOSPADM

## 2022-05-12 NOTE — DISCHARGE INSTRUCTIONS

## 2022-05-12 NOTE — OP NOTE
Therapeutic Lumbar Medial Branch Radiofrequency Ablation under Fluoroscopy     The procedure, risks, benefits, and options were discussed with the patient. There are no contraindications to the procedure. The patent expressed understanding and agreed to the procedure. Informed written consent was obtained prior to the start of the procedure and can be found in the patient's chart.        PATIENT NAME: Joyce Peterson   MRN: [id number]     DATE OF PROCEDURE: 05/12/2022     PROCEDURE:  Right L3, L4 and L5 Lumbar Radiofrequency Ablation under Fluoroscopy    PRE-OP DIAGNOSIS: Lumbar spondylosis [M47.816] Lumbar spondylosis [M47.816]    POST-OP DIAGNOSIS: Same    PHYSICIAN: Tj Mayfield MD    ASSISTANTS: Brennen Dominguze MD, [employer] PM&R PGY-3        MEDICATIONS INJECTED:  Preservative-free Decadron 10mg with 9cc of Bupivicaine 0.25%    LOCAL ANESTHETIC INJECTED:   Xylocaine 2%    SEDATION:   Versed 2mg and Fentanyl 75mcg                                                                                                                                                                                     Conscious sedation ordered by [facility]ICCO. Patient re-evaluation prior to administration of conscious sedation. No changes noted in patient's status from initial evaluation. The patient's vital signs were monitored by RN and patient remained hemodynamically stable throughout the procedure.    Event Time In   Sedation Start 1003       ESTIMATED BLOOD LOSS:  None    COMPLICATIONS:  None     INTERVAL HISTORY: Patient has clinical and imaging findings suggestive of facet mediated pain. Patients has completed 2 previous diagnostic medial branch blocks at specified levels with at least 80% relief for the expected duration of the local anesthetic utilized.    TECHNIQUE: Time-out was performed to identify the patient and procedure to be performed. With the patient laying in a prone position, the surgical area was prepped and draped in the usual sterile  fashion using ChloraPrep and fenestrated drape. The levels were determined under fluoroscopic guidance. Skin anesthesia was achieved by injecting Lidocaine 2% over the injection sites. A 20 gauge 10mm curved active tip needle was introduced to the anatomic local of the medial branch at each of the above levels using AP, lateral and/or contralateral oblique fluoroscopic imaging. Then sensory and motor testing was performed to confirm that the needle tips were in the correct location. After negative aspiration for blood or CSF was confirmed, 1 mL of the lidocaine 2% listed above was injected slowly at each site. This was followed by thermal lesioning at 80 degrees celsius for 90 seconds. That was followed by slowly injecting 2 mL of the medication mixture listed above at each site. The needles were removed and bleeding was nil. A sterile dressing was applied. No specimens collected. The patient tolerated the procedure well and did not have any procedure related motor deficit at the conclusion of the procedure.    PAIN BEFORE THE PROCEDURE: 5-8/10    PAIN AFTER THE PROCEDURE: 2/10   The patient was monitored after the procedure in the recovery area. They were given post-procedure and discharge instructions to follow at home. The patient was discharged in a stable condition.        Tj Mayfield MD

## 2022-05-12 NOTE — BRIEF OP NOTE
Discharge Note  Short Stay      SUMMARY     Admit Date: 5/12/2022    Attending Physician: Tj Mayfield      Discharge Physician: Tj Mayfield      Discharge Date: 5/12/2022 10:20 AM    Procedure(s) (LRB):  Radiofrequency Ablation RIGHT L3,L4,L5 (Right)    Final Diagnosis: Lumbar spondylosis [M47.816]    Disposition: Home or self care    Patient Instructions:   Current Discharge Medication List      CONTINUE these medications which have NOT CHANGED    Details   albuterol (PROVENTIL/VENTOLIN HFA) 90 mcg/actuation inhaler INHALE 2 PUFFS INTO THE LUNGS EVERY 6 (SIX) HOURS AS NEEDED. RESCUE  Qty: 20.1 g, Refills: 11      albuterol-ipratropium (DUO-NEB) 2.5 mg-0.5 mg/3 mL nebulizer solution Take 3 mLs by nebulization every 6 (six) hours as needed for Wheezing. Rescue  Qty: 90 mL, Refills: 3    Associated Diagnoses: Moderate persistent asthma with acute exacerbation; Bronchitis      aspirin 81 mg Tab Take 81 mg by mouth every morning.       budesonide-formoterol 160-4.5 mcg (SYMBICORT) 160-4.5 mcg/actuation HFAA INHALE 2 PUFFS INTO THE LUNGS EVERY 12 (TWELVE) HOURS.  Qty: 30.6 g, Refills: 3    Associated Diagnoses: Chronic asthma, mild intermittent, uncomplicated      calcium citrate-vitamin D3 315-200 mg (CITRACAL+D) 315 mg-5 mcg (200 unit) per tablet Take 1 tablet by mouth 2 (two) times daily.       cycloSPORINE (RESTASIS) 0.05 % ophthalmic emulsion Instill one drop into both eyes two times per day  Qty: 60 each, Refills: 10      diclofenac sodium (VOLTAREN) 1 % Gel APPLY 2 GRAMS TOPICALLY 4 (FOUR) TIMES DAILY AS NEEDED.  Qty: 300 g, Refills: 2      erythromycin (ROMYCIN) ophthalmic ointment Place into the left eye 2 (two) times daily. Apply to affected external eye tissue  Qty: 3.5 g, Refills: 0    Associated Diagnoses: Blepharitis of left lower eyelid, unspecified type      estrogens, conjugated, (PREMARIN) 0.625 MG tablet take 1 tablet by mouth daily  Qty: 90 tablet, Refills: 4      fluconazole (DIFLUCAN) 100 MG tablet  Take 1 tablet by mouth once daily for 14 days.  Qty: 14 tablet, Refills: 0      halobetasol propion-tazarotene (DUOBRII) 0.01-0.045 % Lotn aaa on feet and hands qhs  then vaseline and warm towel  Qty: 100 g, Refills: 3    Associated Diagnoses: Psoriasiform dermatitis      INV PROPULSID 10 MG Take 1 tablets (20 mg) by mouth 4 (four) times daily. FOR INVESTIGATIONAL USE ONLY. Protocol [id number]  Qty: 1220 each, Refills: 0    Associated Diagnoses: Patient in clinical research study; Gastroparesis      ketoconazole (NIZORAL) 2 % cream Apply to feet twice daily for 3 weeks  Qty: 60 g, Refills: 3    Associated Diagnoses: Tinea pedis of both feet      leflunomide (ARAVA) 20 MG Tab TAKE 1 TABLET BY MOUTH EVERY DAY  Qty: 90 tablet, Refills: 0    Associated Diagnoses: Rheumatoid arthritis      lifitegrast (XIIDRA) 5 % Dpet INSTILL ONE DROP INTO BOTH EYES TWICE A DAY  Qty: 60 mL, Refills: 10      loteprednol (LOTEMAX) 0.5 % ophthalmic suspension Place 1 drop into both eyes 2 (two) times a day. for 7 days  Qty: 5 mL, Refills: 1    Associated Diagnoses: Irritation of both eyes; Bilateral dry eyes      mirabegron (MYRBETRIQ) 25 mg Tb24 ER tablet Take 1 tablet (25 mg total) by mouth once daily.  Qty: 30 tablet, Refills: 11      montelukast (SINGULAIR) 10 mg tablet TAKE 1 TABLET BY MOUTH EVERY DAY AT NIGHT  Qty: 90 tablet, Refills: 3    Associated Diagnoses: Perennial allergic rhinitis      naproxen (NAPROSYN) 500 MG tablet Take 1 tablet (500 mg total) by mouth 2 (two) times daily as needed.  Qty: 180 tablet, Refills: 0      omeprazole (PRILOSEC) 40 MG capsule Take 1 capsule (40 mg total) by mouth every morning.  Qty: 30 capsule, Refills: 6      omeprazole-sodium bicarbonate (ZEGERID) 40-1.1 mg-gram per capsule TAKE 1 CAPSULE BY MOUTH EVERY DAY IN THE MORNING  Qty: 90 capsule, Refills: 3      POTASSIUM ORAL Take by mouth once daily.      predniSONE (DELTASONE) 5 MG tablet TAKE 1 TABLET BY MOUTH EVERY DAY  Qty: 90 tablet, Refills:  1      pregabalin (LYRICA) 50 MG capsule Take 1 capsule (50 mg total) by mouth every evening.  Qty: 90 capsule, Refills: 1    Associated Diagnoses: Fibromyalgia      PREMARIN vaginal cream PLACE 0.5 GRAM VAGINALLY 3 (THREE) TIMES A WEEK.  Qty: 30 g, Refills: 1    Comments: PT REQUESTED REFILLS      !! progesterone (PROMETRIUM) 100 MG capsule Take 1 capsule by mouth daily.  Qty: 90 capsule, Refills: 1    Associated Diagnoses: Unspecified ovarian cyst, unspecified side; Other specified counseling      !! progesterone (PROMETRIUM) 100 MG capsule take 1 capsule by mouth daily  Qty: 90 capsule, Refills: 4      promethazine (PHENERGAN) 25 MG tablet Take 1 tablet (25 mg total) by mouth every 6 (six) hours as needed for Nausea.  Qty: 30 tablet, Refills: 1      rosuvastatin (CRESTOR) 20 MG tablet Take 1 tablet (20 mg total) by mouth every evening.  Qty: 90 tablet, Refills: 3    Associated Diagnoses: Coronary artery disease involving native coronary artery of native heart without angina pectoris      sarilumab (KEVZARA) 200 mg/1.14 mL Syrg Inject 1.14 mL (200 mg total) into the skin every 14 (fourteen) days.  Qty: 2.28 mL, Refills: 2    Associated Diagnoses: Rheumatoid arthritis, involving unspecified site, unspecified whether rheumatoid factor present      sucralfate (CARAFATE) 1 gram tablet Take 1 tablet (1 g total) by mouth 4 (four) times daily.  Qty: 360 tablet, Refills: 3    Associated Diagnoses: Gastroparesis      traMADoL (ULTRAM) 50 mg tablet TAKE 1 TABLET (50 MG TOTAL) BY MOUTH EVERY 12 (TWELVE) HOURS AS NEEDED FOR PAIN.  Qty: 60 tablet, Refills: 0    Comments: Not to exceed 5 additional fills before 02/07/2022  Associated Diagnoses: Chronic hip pain, left       !! - Potential duplicate medications found. Please discuss with provider.              Discharge Diagnosis: Lumbar spondylosis [M47.816]  Condition on Discharge: Stable with no complications to procedure   Diet on Discharge: Same as before.  Activity: as per  instruction sheet.  Discharge to: Home with a responsible adult.  Follow up: 2-4 weeks       Please call my office or pager at [phone] if experienced any weakness or loss of sensation, fever > 101.5, pain uncontrolled with oral medications, persistent nausea/vomiting/or diarrhea, redness or drainage from the incisions, or any other worrisome concerns. If physician on call was not reached or could not communicate with our office for any reason please go to the nearest emergency department

## 2022-05-13 ENCOUNTER — TELEPHONE (OUTPATIENT)
Dept: ENDOSCOPY | Facility: HOSPITAL | Age: 62
End: 2022-05-13
Payer: MEDICARE

## 2022-05-13 DIAGNOSIS — M25.552 LEFT HIP PAIN: Primary | ICD-10-CM

## 2022-05-13 NOTE — TELEPHONE ENCOUNTER
----- Message from Eneida Moreau sent at 5/13/2022  2:39 PM CDT -----  Contact: self @ [phone]  Pt says she is returning Lynette's call.  She says she only has 8 days of medication left.  She says she will be going out of town 5-17-22 through 5-24-22 and she will run out of medication on 5-20-22.  She is calling to see if she can come get some medication on 5-16-22.  Pls call.

## 2022-05-16 ENCOUNTER — OFFICE VISIT (OUTPATIENT)
Dept: OPTOMETRY | Facility: CLINIC | Age: 62
End: 2022-05-16
Payer: MEDICARE

## 2022-05-16 DIAGNOSIS — K31.84 GASTROPARESIS: Primary | ICD-10-CM

## 2022-05-16 DIAGNOSIS — H52.203 HYPEROPIA OF BOTH EYES WITH ASTIGMATISM: ICD-10-CM

## 2022-05-16 DIAGNOSIS — H52.4 PRESBYOPIA: ICD-10-CM

## 2022-05-16 DIAGNOSIS — H53.001 AMBLYOPIA EX ANOPSIA OF RIGHT EYE: ICD-10-CM

## 2022-05-16 DIAGNOSIS — H25.13 NUCLEAR SCLEROSIS OF BOTH EYES: ICD-10-CM

## 2022-05-16 DIAGNOSIS — H52.03 HYPEROPIA OF BOTH EYES WITH ASTIGMATISM: ICD-10-CM

## 2022-05-16 DIAGNOSIS — H04.123 BILATERAL DRY EYES: Primary | ICD-10-CM

## 2022-05-16 DIAGNOSIS — Z00.6 PATIENT IN CLINICAL RESEARCH STUDY: ICD-10-CM

## 2022-05-16 PROCEDURE — 92015 PR REFRACTION: ICD-10-PCS | Mod: S$GLB,,, | Performed by: OPTOMETRIST

## 2022-05-16 PROCEDURE — 3044F HG A1C LEVEL LT 7.0%: CPT | Mod: CPTII,S$GLB,, | Performed by: OPTOMETRIST

## 2022-05-16 PROCEDURE — 99999 PR PBB SHADOW E&M-EST. PATIENT-LVL IV: ICD-10-PCS | Mod: PBBFAC,,, | Performed by: OPTOMETRIST

## 2022-05-16 PROCEDURE — 1159F MED LIST DOCD IN RCRD: CPT | Mod: CPTII,S$GLB,, | Performed by: OPTOMETRIST

## 2022-05-16 PROCEDURE — 99999 PR PBB SHADOW E&M-EST. PATIENT-LVL IV: CPT | Mod: PBBFAC,,, | Performed by: OPTOMETRIST

## 2022-05-16 PROCEDURE — 92012 PR EYE EXAM, EST PATIENT,INTERMED: ICD-10-PCS | Mod: S$GLB,,, | Performed by: OPTOMETRIST

## 2022-05-16 PROCEDURE — 3044F PR MOST RECENT HEMOGLOBIN A1C LEVEL <7.0%: ICD-10-PCS | Mod: CPTII,S$GLB,, | Performed by: OPTOMETRIST

## 2022-05-16 PROCEDURE — 92012 INTRM OPH EXAM EST PATIENT: CPT | Mod: S$GLB,,, | Performed by: OPTOMETRIST

## 2022-05-16 PROCEDURE — 92015 DETERMINE REFRACTIVE STATE: CPT | Mod: S$GLB,,, | Performed by: OPTOMETRIST

## 2022-05-16 PROCEDURE — 1159F PR MEDICATION LIST DOCUMENTED IN MEDICAL RECORD: ICD-10-PCS | Mod: CPTII,S$GLB,, | Performed by: OPTOMETRIST

## 2022-05-17 NOTE — PROGRESS NOTES
"HPI     Patient's age: 61 y.o. WF   Occupation: Disabled   Approximate date of last eye examination: 04/05/2022  Name of last eye doctor seen:    City/State: [facility]   Wears glasses? Yes      If yes, wears  Full-time or part-time?  full-time   Present g lasses are: Bifocal, SV Distance, SV Reading?  Progressive lens   Approximate age of present glasses:  2 yrs    Any problem with VA with glasses?  no     Wears CLs?:  Yes - part-time            If yes:               Type of CL worn:  1 Day Acuvue Moist                                             OD 8.5    +4.75 sphere                                              OS 8.5    +3.75sphere               Wears full-time or part-time:  Part time                Sleeps with contact lenses:  No                CL So lution used:                  How often replace CLs:  Dailies               Any problem with VA with CLs?  no                 Headaches?  No  Eye pain/discomfort?  dryness symptoms, blurry vision                                                                                     Flashes?  No   Floaters?  No   Diplopia/Double vision?  No   Patient's Ocular History:          Any eye surgeries? No          Any eye injury?  No          Any treatment for eye disease?  No   Family history of eye disea se?  No   Significant patient medical history:         1. Diabetes?  no      If yes, IDDM or NIDDM?   n/a               2. HBP?  No               3. Other (describe):   rheumatoid arthritis    ! OTC eyedrops currently using:  refresh during the day  , sy stane gel   at night      ! Prescription eye meds currently using:            Serum tears (Dr. Hardy)  QID OU  "every time I pass the   refrigerator"            Restasis OU BID            Refresh ATs - all day OU, and gel drops at bedtime            Xiidra BID OU (both Restasis and Xiidra, per Dr. Armas)    ! Any history of allergy/adverse reaction to any eye meds used   previously?  No      ! Any history of " allergy/adverse reaction to eyedrops used during prior   eye exam(s)? no    ! Any history of allergy/a dverse reaction to Novacaine or similar meds?   no    ! Any history of allergy/adverse reaction to Epinephrine or similar meds?   no    ! Patient okay with use of anesthetic eyedrops to check eye pressure?    yes        ! Patient okay with use of eyedrops to dilate pupils today?  NO    !  Allergies/Medications/Medical History/Family History reviewed today?    yes     Last edited by Ángel Domingo MA on 5/16/2022  2:34 PM. (History)            Assessment /Plan     For exam results, see Encounter Report.    1. Bilateral dry eyes     2. Nuclear sclerosis of both eyes     3. Hyperopia of both eyes with astigmatism     4. Presbyopia     5. Amblyopia ex anopsia of right eye                Bilateral dry eyes.     Using Restasis, and Xiidra, and autologous tears in both eyes.  Mrs. Peterson declines re-insertion of (medium-term dissolvable) punctal plugs, as she states that she was unhappy with the results when medium-term punctal plugs were previously inserted by Dr. Armas.     Prior diagnosis of early/trace nuclear sclerosis of lens of both eyes.    Mrs. Peterson in today for recheck of refraction and a new spectacle lens Rx, as she has broken her glasses.  However, she declines dilated fundus exam today.    Hyperopia with astigmatism in each eye.  Satisfactory best-corrected VA in each eye, although better in the left eye than in the right eye.  Presbyopia consistent with age.  New spectacle lens Rx issued     Continue dry eye treatment as noted above.

## 2022-05-17 NOTE — PATIENT INSTRUCTIONS
Bilateral dry eyes.     Using Restasis, and Xiidra, and autologous tears in both eyes.  Mrs. Peterson declines re-insertion of (medium-term dissolvable) punctal plugs, as she states that she was unhappy with the results when medium-term punctal plugs were previously inserted by Dr. Armas.     Prior diagnosis of early/trace nuclear sclerosis of lens of both eyes.    Mrs. Peterson in today for recheck of refraction and a new spectacle lens Rx, as she has broken her glasses.  However, she declines dilated fundus exam today.    Hyperopia with astigmatism in each eye.  Satisfactory best-corrected VA in each eye, although better in the left eye than in the right eye.  Presbyopia consistent with age.  New spectacle lens Rx issued     Continue dry eye treatment as noted above.

## 2022-05-22 ENCOUNTER — PATIENT MESSAGE (OUTPATIENT)
Dept: UROLOGY | Facility: CLINIC | Age: 62
End: 2022-05-22
Payer: MEDICARE

## 2022-05-22 DIAGNOSIS — R39.15 URINARY URGENCY: Primary | ICD-10-CM

## 2022-05-23 ENCOUNTER — PATIENT MESSAGE (OUTPATIENT)
Dept: OPTOMETRY | Facility: CLINIC | Age: 62
End: 2022-05-23
Payer: MEDICARE

## 2022-05-23 ENCOUNTER — PATIENT MESSAGE (OUTPATIENT)
Dept: UROLOGY | Facility: CLINIC | Age: 62
End: 2022-05-23
Payer: MEDICARE

## 2022-05-23 RX ORDER — LEVOFLOXACIN 500 MG/1
500 TABLET, FILM COATED ORAL DAILY
Qty: 7 TABLET | Refills: 1 | OUTPATIENT
Start: 2022-05-23 | End: 2022-05-30

## 2022-05-24 ENCOUNTER — PATIENT MESSAGE (OUTPATIENT)
Dept: UROLOGY | Facility: CLINIC | Age: 62
End: 2022-05-24
Payer: MEDICARE

## 2022-05-25 ENCOUNTER — OFFICE VISIT (OUTPATIENT)
Dept: ORTHOPEDICS | Facility: CLINIC | Age: 62
End: 2022-05-25
Payer: MEDICARE

## 2022-05-25 VITALS — BODY MASS INDEX: 29.07 KG/M2 | HEIGHT: 61 IN | WEIGHT: 154 LBS

## 2022-05-25 DIAGNOSIS — S72.145D CLOSED NONDISPLACED INTERTROCHANTERIC FRACTURE OF LEFT FEMUR WITH ROUTINE HEALING, SUBSEQUENT ENCOUNTER: Primary | ICD-10-CM

## 2022-05-25 PROCEDURE — 99999 PR PBB SHADOW E&M-EST. PATIENT-LVL IV: CPT | Mod: PBBFAC,,, | Performed by: ORTHOPAEDIC SURGERY

## 2022-05-25 PROCEDURE — 1160F RVW MEDS BY RX/DR IN RCRD: CPT | Mod: CPTII,S$GLB,, | Performed by: ORTHOPAEDIC SURGERY

## 2022-05-25 PROCEDURE — 1159F MED LIST DOCD IN RCRD: CPT | Mod: CPTII,S$GLB,, | Performed by: ORTHOPAEDIC SURGERY

## 2022-05-25 PROCEDURE — 3008F BODY MASS INDEX DOCD: CPT | Mod: CPTII,S$GLB,, | Performed by: ORTHOPAEDIC SURGERY

## 2022-05-25 PROCEDURE — 1159F PR MEDICATION LIST DOCUMENTED IN MEDICAL RECORD: ICD-10-PCS | Mod: CPTII,S$GLB,, | Performed by: ORTHOPAEDIC SURGERY

## 2022-05-25 PROCEDURE — 99999 PR PBB SHADOW E&M-EST. PATIENT-LVL IV: ICD-10-PCS | Mod: PBBFAC,,, | Performed by: ORTHOPAEDIC SURGERY

## 2022-05-25 PROCEDURE — 1160F PR REVIEW ALL MEDS BY PRESCRIBER/CLIN PHARMACIST DOCUMENTED: ICD-10-PCS | Mod: CPTII,S$GLB,, | Performed by: ORTHOPAEDIC SURGERY

## 2022-05-25 PROCEDURE — 99213 PR OFFICE/OUTPT VISIT, EST, LEVL III, 20-29 MIN: ICD-10-PCS | Mod: S$GLB,,, | Performed by: ORTHOPAEDIC SURGERY

## 2022-05-25 PROCEDURE — 3008F PR BODY MASS INDEX (BMI) DOCUMENTED: ICD-10-PCS | Mod: CPTII,S$GLB,, | Performed by: ORTHOPAEDIC SURGERY

## 2022-05-25 PROCEDURE — 99213 OFFICE O/P EST LOW 20 MIN: CPT | Mod: S$GLB,,, | Performed by: ORTHOPAEDIC SURGERY

## 2022-05-25 PROCEDURE — 3044F HG A1C LEVEL LT 7.0%: CPT | Mod: CPTII,S$GLB,, | Performed by: ORTHOPAEDIC SURGERY

## 2022-05-25 PROCEDURE — 3044F PR MOST RECENT HEMOGLOBIN A1C LEVEL <7.0%: ICD-10-PCS | Mod: CPTII,S$GLB,, | Performed by: ORTHOPAEDIC SURGERY

## 2022-05-25 RX ORDER — METHENAMINE HIPPURATE 1000 MG/1
1 TABLET ORAL 2 TIMES DAILY
Qty: 180 TABLET | Refills: 3 | Status: SHIPPED | OUTPATIENT
Start: 2022-05-25 | End: 2023-06-15 | Stop reason: SDUPTHER

## 2022-05-25 NOTE — PROGRESS NOTES
HPI: 61-year-old female status post cephalomedullary nail fixation of nondisplaced left intertrochanteric femur fracture by me on 05/21/2019.  The patient has had several falls recently and has an upcoming surgery with the foot and ankle surgeons for foot issues.  Her last fall on 05/03/2022 brought her to the emergency department.  At that point they obtained x-rays of the left hip.  She is now following up with me today.  She has had pain around the area the left hip, most notably posteriorly in the buttocks and laterally.  This improved over time.    Past Medical History:   Diagnosis Date    Acid reflux     Allergy     Anemia     Asthma     Coronary artery disease     CS (cervical spondylosis) 3/8/2013    Degenerative disc disease     Dry eyes     Dry mouth     Gastroparesis     History of methotrexate therapy 1/19/2022    Hyperlipidemia     Lateral meniscus derangement 4/6/2016    Lobular carcinoma in situ     Lumbar spondylosis 3/8/2013    Osteoarthritis     Osteoporosis     Rheumatoid arthritis(714.0)     Rupture of left triceps tendon 10/17/2018    Umbilical hernia 8/13/2015       Current Outpatient Medications on File Prior to Visit   Medication Sig Dispense Refill    albuterol (PROVENTIL/VENTOLIN HFA) 90 mcg/actuation inhaler INHALE 2 PUFFS INTO THE LUNGS EVERY 6 (SIX) HOURS AS NEEDED. RESCUE 20.1 g 11    albuterol-ipratropium (DUO-NEB) 2.5 mg-0.5 mg/3 mL nebulizer solution Take 3 mLs by nebulization every 6 (six) hours as needed for Wheezing. Rescue 90 mL 3    aspirin 81 mg Tab Take 81 mg by mouth every morning.       budesonide-formoterol 160-4.5 mcg (SYMBICORT) 160-4.5 mcg/actuation HFAA INHALE 2 PUFFS INTO THE LUNGS EVERY 12 (TWELVE) HOURS. 30.6 g 3    calcium citrate-vitamin D3 315-200 mg (CITRACAL+D) 315 mg-5 mcg (200 unit) per tablet Take 1 tablet by mouth 2 (two) times daily.       cycloSPORINE (RESTASIS) 0.05 % ophthalmic emulsion Instill one drop into both eyes two times  per day 60 each 10    diclofenac sodium (VOLTAREN) 1 % Gel APPLY 2 GRAMS TOPICALLY 4 (FOUR) TIMES DAILY AS NEEDED. 300 g 2    erythromycin (ROMYCIN) ophthalmic ointment Place into the left eye 2 (two) times daily. Apply to affected external eye tissue 3.5 g 0    estrogens, conjugated, (PREMARIN) 0.625 MG tablet take 1 tablet by mouth daily 90 tablet 4    fluconazole (DIFLUCAN) 100 MG tablet Take 1 tablet by mouth once daily for 14 days. 14 tablet 0    halobetasol propion-tazarotene (DUOBRII) 0.01-0.045 % Lotn aaa on feet and hands qhs  then vaseline and warm towel 100 g 3    INV PROPULSID 10 MG Take 2 tablets (20 mg) by mouth 4 (four) times daily. FOR INVESTIGATIONAL USE ONLY. Protocol CIS-USA-154  Subject ID: [id number]. 1440 each 0    ketoconazole (NIZORAL) 2 % cream Apply to feet twice daily for 3 weeks (Patient taking differently: Apply to feet twice daily for 3 weeks) 60 g 3    leflunomide (ARAVA) 20 MG Tab TAKE 1 TABLET BY MOUTH EVERY DAY 90 tablet 0    lifitegrast (XIIDRA) 5 % Dpet INSTILL ONE DROP INTO BOTH EYES TWICE A DAY (Patient taking differently: INSTILL ONE DROP INTO BOTH EYES TWICE A DAY) 60 mL 10    montelukast (SINGULAIR) 10 mg tablet TAKE 1 TABLET BY MOUTH EVERY DAY AT NIGHT 90 tablet 3    omeprazole (PRILOSEC) 40 MG capsule Take 1 capsule (40 mg total) by mouth every morning. 30 capsule 6    omeprazole-sodium bicarbonate (ZEGERID) 40-1.1 mg-gram per capsule TAKE 1 CAPSULE BY MOUTH EVERY DAY IN THE MORNING 90 capsule 3    POTASSIUM ORAL Take by mouth once daily.      predniSONE (DELTASONE) 5 MG tablet TAKE 1 TABLET BY MOUTH EVERY DAY 90 tablet 1    pregabalin (LYRICA) 50 MG capsule Take 1 capsule (50 mg total) by mouth every evening. 90 capsule 1    PREMARIN vaginal cream PLACE 0.5 GRAM VAGINALLY 3 (THREE) TIMES A WEEK. 30 g 1    progesterone (PROMETRIUM) 100 MG capsule Take 1 capsule by mouth daily. 90 capsule 1    progesterone (PROMETRIUM) 100 MG capsule take 1 capsule by mouth  daily 90 capsule 4    promethazine (PHENERGAN) 25 MG tablet Take 1 tablet (25 mg total) by mouth every 6 (six) hours as needed for Nausea. 30 tablet 1    rosuvastatin (CRESTOR) 20 MG tablet Take 1 tablet (20 mg total) by mouth every evening. 90 tablet 3    sarilumab (KEVZARA) 200 mg/1.14 mL Syrg Inject 1.14 mL (200 mg total) into the skin every 14 (fourteen) days. 2.28 mL 2    sucralfate (CARAFATE) 1 gram tablet Take 1 tablet (1 g total) by mouth 4 (four) times daily. 360 tablet 3    traMADoL (ULTRAM) 50 mg tablet TAKE 1 TABLET (50 MG TOTAL) BY MOUTH EVERY 12 (TWELVE) HOURS AS NEEDED FOR PAIN. 60 tablet 0    [DISCONTINUED] naproxen (NAPROSYN) 500 MG tablet Take 1 tablet (500 mg total) by mouth 2 (two) times daily as needed. 180 tablet 0    mirabegron (MYRBETRIQ) 25 mg Tb24 ER tablet Take 1 tablet (25 mg total) by mouth once daily. 30 tablet 11     No current facility-administered medications on file prior to visit.         ROS:  Patient denies constitutional symptoms, cardiac symptoms, respiratory symptoms, GI symptoms.  The remainder of the musculoskeletal ROS is included in the HPI.    PE:    AA&O x 4.  NAD  HEENT:  NCAT, sclera nonicteric  Lungs:  Respirations are equal and unlabored.  CV:  2+ bilateral upper and lower extremity pulses.  Skin:  Intact throughout.    MS:  Exam of the left lower extremity shows good range of motion of the hip.  No pain with log roll.  Some tenderness to palpation laterally and posterior in the buttocks.  No groin tenderness to palpation.  Neurovascular intact distally.    Rads:  X-rays of the left femur performed today were reviewed and interpreted by me personally.  There is a small cortical defect that looks like a fracture line posteriorly with an intact nail.  Looking back at her films from 05/03 I think I can see the same line.  Today this is beginning to show some early changes of callus.    A/P:  61-year-old female with a likely nondisplaced the  intertrochanteric/subtrochanteric partial fracture with a cephalomedullary nail already in place from 2019. I explained to her that this is not a concern with the nail artery and placed in that she continue weight-bearing as tolerated.  Her pain is already improving and this may even be an older/subacute fracture.  She is still undergoing treatment for osteoporosis with calcium and vitamin D as well as taking Prolia.  I think this will continue to heal well on its own and is stable with the artery present implant.  She will follow up if she has any new or increasing pain or other issues.

## 2022-05-26 ENCOUNTER — OFFICE VISIT (OUTPATIENT)
Dept: UROLOGY | Facility: CLINIC | Age: 62
End: 2022-05-26
Payer: MEDICARE

## 2022-05-26 VITALS
BODY MASS INDEX: 29.42 KG/M2 | OXYGEN SATURATION: 97 % | RESPIRATION RATE: 16 BRPM | HEART RATE: 97 BPM | SYSTOLIC BLOOD PRESSURE: 126 MMHG | HEIGHT: 61 IN | TEMPERATURE: 98 F | DIASTOLIC BLOOD PRESSURE: 60 MMHG | WEIGHT: 155.81 LBS

## 2022-05-26 DIAGNOSIS — R10.2 SUPRAPUBIC PRESSURE: ICD-10-CM

## 2022-05-26 DIAGNOSIS — N39.41 URGE URINARY INCONTINENCE: ICD-10-CM

## 2022-05-26 DIAGNOSIS — R39.15 URINARY URGENCY: Primary | ICD-10-CM

## 2022-05-26 DIAGNOSIS — Z87.440 HISTORY OF RECURRENT UTIS: ICD-10-CM

## 2022-05-26 DIAGNOSIS — N32.89 BLADDER SPASMS: ICD-10-CM

## 2022-05-26 DIAGNOSIS — R35.1 NOCTURIA: ICD-10-CM

## 2022-05-26 PROCEDURE — 3078F DIAST BP <80 MM HG: CPT | Mod: CPTII,S$GLB,, | Performed by: NURSE PRACTITIONER

## 2022-05-26 PROCEDURE — 99999 PR PBB SHADOW E&M-EST. PATIENT-LVL V: ICD-10-PCS | Mod: PBBFAC,,, | Performed by: NURSE PRACTITIONER

## 2022-05-26 PROCEDURE — 3074F SYST BP LT 130 MM HG: CPT | Mod: CPTII,S$GLB,, | Performed by: NURSE PRACTITIONER

## 2022-05-26 PROCEDURE — 99999 PR PBB SHADOW E&M-EST. PATIENT-LVL V: CPT | Mod: PBBFAC,,, | Performed by: NURSE PRACTITIONER

## 2022-05-26 PROCEDURE — 99214 PR OFFICE/OUTPT VISIT, EST, LEVL IV, 30-39 MIN: ICD-10-PCS | Mod: S$GLB,,, | Performed by: NURSE PRACTITIONER

## 2022-05-26 PROCEDURE — 3044F PR MOST RECENT HEMOGLOBIN A1C LEVEL <7.0%: ICD-10-PCS | Mod: CPTII,S$GLB,, | Performed by: NURSE PRACTITIONER

## 2022-05-26 PROCEDURE — 3008F PR BODY MASS INDEX (BMI) DOCUMENTED: ICD-10-PCS | Mod: CPTII,S$GLB,, | Performed by: NURSE PRACTITIONER

## 2022-05-26 PROCEDURE — 3044F HG A1C LEVEL LT 7.0%: CPT | Mod: CPTII,S$GLB,, | Performed by: NURSE PRACTITIONER

## 2022-05-26 PROCEDURE — 1160F RVW MEDS BY RX/DR IN RCRD: CPT | Mod: CPTII,S$GLB,, | Performed by: NURSE PRACTITIONER

## 2022-05-26 PROCEDURE — 3074F PR MOST RECENT SYSTOLIC BLOOD PRESSURE < 130 MM HG: ICD-10-PCS | Mod: CPTII,S$GLB,, | Performed by: NURSE PRACTITIONER

## 2022-05-26 PROCEDURE — 3078F PR MOST RECENT DIASTOLIC BLOOD PRESSURE < 80 MM HG: ICD-10-PCS | Mod: CPTII,S$GLB,, | Performed by: NURSE PRACTITIONER

## 2022-05-26 PROCEDURE — 1160F PR REVIEW ALL MEDS BY PRESCRIBER/CLIN PHARMACIST DOCUMENTED: ICD-10-PCS | Mod: CPTII,S$GLB,, | Performed by: NURSE PRACTITIONER

## 2022-05-26 PROCEDURE — 99214 OFFICE O/P EST MOD 30 MIN: CPT | Mod: S$GLB,,, | Performed by: NURSE PRACTITIONER

## 2022-05-26 PROCEDURE — 1159F PR MEDICATION LIST DOCUMENTED IN MEDICAL RECORD: ICD-10-PCS | Mod: CPTII,S$GLB,, | Performed by: NURSE PRACTITIONER

## 2022-05-26 PROCEDURE — 3008F BODY MASS INDEX DOCD: CPT | Mod: CPTII,S$GLB,, | Performed by: NURSE PRACTITIONER

## 2022-05-26 PROCEDURE — 1159F MED LIST DOCD IN RCRD: CPT | Mod: CPTII,S$GLB,, | Performed by: NURSE PRACTITIONER

## 2022-05-26 RX ORDER — CIPROFLOXACIN 500 MG/1
500 TABLET ORAL EVERY 12 HOURS
Qty: 14 TABLET | Refills: 0 | Status: SHIPPED | OUTPATIENT
Start: 2022-05-26 | End: 2022-06-02

## 2022-05-26 RX ORDER — MIRABEGRON 25 MG/1
25 TABLET, FILM COATED, EXTENDED RELEASE ORAL DAILY
Qty: 90 TABLET | Refills: 3 | Status: SHIPPED | OUTPATIENT
Start: 2022-05-26 | End: 2023-03-14

## 2022-05-26 NOTE — PATIENT INSTRUCTIONS
Bladder scan (PVR)  U/S retroperitoneal complete today  Temporarily hold methenamine (bladder antiseptic) until completion of Cipro (antibiotic).   Continue taking mirabegron 25 mg daily for overactive bladder.  Follow-up pending U/S results.

## 2022-05-26 NOTE — PROGRESS NOTES
Subjective:       Patient ID: Joyce Peterson is a 61 y.o. female.    Chief Complaint: Urinary Frequency    Patient is here today for evaluation of possible UTI and for urine cx results from Monday. Patient reports feeling a little better from Sunday. However, she's still experiencing frequency, urgency, suprapubic pressure, and bladder spasms.     Urinary Frequency   This is a new problem. The current episode started in the past 7 days. The problem has been gradually improving. The pain is at a severity of 0/10. The patient is experiencing no pain. There has been no fever. There is no history of pyelonephritis. Associated symptoms include frequency and urgency (with leakage). Pertinent negatives include no behavior changes, chills, discharge, flank pain, hematuria, hesitancy, nausea, possible pregnancy, sweats, vomiting, weight loss, bubble bath use, constipation, rash or withholding. She has tried nothing for the symptoms. Her past medical history is significant for recurrent UTIs. There is no history of diabetes mellitus, hypertension, kidney stones, a single kidney or a urological procedure.     Review of Systems   Constitutional: Positive for fatigue. Negative for appetite change, chills, fever (resolved) and weight loss.   Gastrointestinal: Negative for abdominal pain, change in bowel habit, constipation, diarrhea, nausea, vomiting and change in bowel habit.   Genitourinary: Positive for frequency, nocturia (x2-3), pelvic pain (suprapubic pressure and bladder spasms) and urgency (with leakage). Negative for decreased urine volume, difficulty urinating, dysuria, flank pain, hematuria, hesitancy, vaginal bleeding, vaginal discharge and vaginal pain.   Integumentary:  Negative for rash.   Neurological: Negative for dizziness, weakness and headaches.   Psychiatric/Behavioral: Negative.          Objective:      Physical Exam  Vitals and nursing note reviewed.   Constitutional:       General: She is not in acute  distress.     Appearance: She is well-developed. She is not ill-appearing.   HENT:      Head: Normocephalic and atraumatic.   Eyes:      Pupils: Pupils are equal, round, and reactive to light.   Cardiovascular:      Rate and Rhythm: Normal rate.   Pulmonary:      Effort: Pulmonary effort is normal. No respiratory distress.   Abdominal:      Palpations: Abdomen is soft.      Tenderness: There is no abdominal tenderness.   Genitourinary:     Comments: PVR = 0 mL  Musculoskeletal:         General: Normal range of motion.      Cervical back: Normal range of motion.   Skin:     General: Skin is warm and dry.   Neurological:      Mental Status: She is alert and oriented to person, place, and time.      Coordination: Coordination normal.   Psychiatric:         Mood and Affect: Mood normal.         Behavior: Behavior normal.         Thought Content: Thought content normal.         Judgment: Judgment normal.         Assessment:       Problem List Items Addressed This Visit        Renal/    Urge urinary incontinence    Relevant Medications    mirabegron (MYRBETRIQ) 25 mg Tb24 ER tablet    Other Relevant Orders    US Retroperitoneal Complete (Kidney and (Completed)    POCT Bladder Scan      Other Visit Diagnoses     Urinary urgency    -  Primary    Relevant Medications    mirabegron (MYRBETRIQ) 25 mg Tb24 ER tablet    Other Relevant Orders    US Retroperitoneal Complete (Kidney and (Completed)    POCT Bladder Scan    Nocturia        Relevant Orders    POCT Bladder Scan    Bladder spasms        Relevant Medications    ciprofloxacin HCl (CIPRO) 500 MG tablet    Other Relevant Orders    US Retroperitoneal Complete (Kidney and (Completed)    POCT Bladder Scan    Suprapubic pressure        Relevant Medications    ciprofloxacin HCl (CIPRO) 500 MG tablet    Other Relevant Orders    US Retroperitoneal Complete (Kidney and (Completed)    POCT Bladder Scan    History of recurrent UTIs        Relevant Medications    ciprofloxacin HCl  (CIPRO) 500 MG tablet    Other Relevant Orders    US Retroperitoneal Complete (Kidney and (Completed)    POCT Bladder Scan          Plan:         Joyce was seen today for urinary frequency.    Diagnoses and all orders for this visit:    Urinary urgency  -     US Retroperitoneal Complete (Kidney and; Future  -     POCT Bladder Scan  -     mirabegron (MYRBETRIQ) 25 mg Tb24 ER tablet; Take 1 tablet (25 mg total) by mouth once daily.    Urge urinary incontinence  -     US Retroperitoneal Complete (Kidney and; Future  -     POCT Bladder Scan  -     mirabegron (MYRBETRIQ) 25 mg Tb24 ER tablet; Take 1 tablet (25 mg total) by mouth once daily.    Nocturia  -     POCT Bladder Scan    Bladder spasms  -     US Retroperitoneal Complete (Kidney and; Future  -     POCT Bladder Scan  -     ciprofloxacin HCl (CIPRO) 500 MG tablet; Take 1 tablet (500 mg total) by mouth every 12 (twelve) hours for 7 days    Suprapubic pressure  -     US Retroperitoneal Complete (Kidney and; Future  -     POCT Bladder Scan  -     ciprofloxacin HCl (CIPRO) 500 MG tablet; Take 1 tablet (500 mg total) by mouth every 12 (twelve) hours for 7 days    History of recurrent UTIs  -     US Retroperitoneal Complete (Kidney and; Future  -     POCT Bladder Scan  -     ciprofloxacin HCl (CIPRO) 500 MG tablet; Take 1 tablet (500 mg total) by mouth every 12 (twelve) hours for 7 days    Other orders  1. Temporarily hold methenamine (bladder antiseptic) until completion of Cipro (antibiotic).   2. Continue taking mirabegron 25 mg daily for overactive bladder.    Follow-up pending U/S results.    Anastasia Healy NP

## 2022-05-27 ENCOUNTER — PATIENT MESSAGE (OUTPATIENT)
Dept: UROLOGY | Facility: CLINIC | Age: 62
End: 2022-05-27
Payer: MEDICARE

## 2022-05-27 ENCOUNTER — TELEPHONE (OUTPATIENT)
Dept: UROLOGY | Facility: CLINIC | Age: 62
End: 2022-05-27
Payer: MEDICARE

## 2022-05-27 NOTE — TELEPHONE ENCOUNTER
Telephone call placed to patient to discuss normal U/S results and also to explain which medications patient should be taking, but no answer. Voice message left for patient to return my call.     Anastasia Healy NP

## 2022-05-28 DIAGNOSIS — Z00.6 PATIENT IN CLINICAL RESEARCH STUDY: ICD-10-CM

## 2022-05-28 DIAGNOSIS — K31.84 GASTROPARESIS: ICD-10-CM

## 2022-06-04 ENCOUNTER — SPECIALTY PHARMACY (OUTPATIENT)
Dept: PHARMACY | Facility: CLINIC | Age: 62
End: 2022-06-04
Payer: MEDICARE

## 2022-06-04 DIAGNOSIS — M05.79 RHEUMATOID ARTHRITIS INVOLVING MULTIPLE SITES WITH POSITIVE RHEUMATOID FACTOR: Primary | ICD-10-CM

## 2022-06-04 NOTE — TELEPHONE ENCOUNTER
Specialty Pharmacy - Clinical Reassessment    Specialty Medication Orders Linked to Encounter    Flowsheet Row Most Recent Value   Medication #1 sarilumab (KEVZARA) 200 mg/1.14 mL Syrg (Order#[id number], Rx#[id number])        Patient Diagnosis   M05.79 - Rheumatoid arthritis involving multiple sites with positive rheumatoid factor    Specialty clinical pharmacist review completed for an annual review of reassessment. Reviewed the following areas: current med list, reports of adverse effects, adherence and progress towards therapeutic goals.    Recommendations: none at this time.    Tasks added this encounter   3/4/2023 - Clinical - Follow Up Assesement (Annual)   Tasks due within next 3 months   6/6/2022 - Refill Call (Auto Added)     Gareth Ambriz, PharmD  [facility]  [address]  [facility] [address]  Phone: [phone]  Fax: [phone]

## 2022-06-08 ENCOUNTER — PATIENT MESSAGE (OUTPATIENT)
Dept: PAIN MEDICINE | Facility: CLINIC | Age: 62
End: 2022-06-08
Payer: MEDICARE

## 2022-06-08 ENCOUNTER — PATIENT MESSAGE (OUTPATIENT)
Dept: PHARMACY | Facility: CLINIC | Age: 62
End: 2022-06-08
Payer: MEDICARE

## 2022-06-08 ENCOUNTER — TELEPHONE (OUTPATIENT)
Dept: PAIN MEDICINE | Facility: CLINIC | Age: 62
End: 2022-06-08
Payer: MEDICARE

## 2022-06-08 DIAGNOSIS — M06.9 RHEUMATOID ARTHRITIS, INVOLVING UNSPECIFIED SITE, UNSPECIFIED WHETHER RHEUMATOID FACTOR PRESENT: ICD-10-CM

## 2022-06-08 RX ORDER — SARILUMAB 200 MG/1.14ML
200 INJECTION, SOLUTION SUBCUTANEOUS
Qty: 2.28 ML | Refills: 2 | Status: SHIPPED | OUTPATIENT
Start: 2022-06-08 | End: 2022-09-06 | Stop reason: SDUPTHER

## 2022-06-09 ENCOUNTER — PATIENT MESSAGE (OUTPATIENT)
Dept: PAIN MEDICINE | Facility: CLINIC | Age: 62
End: 2022-06-09

## 2022-06-09 ENCOUNTER — HOSPITAL ENCOUNTER (OUTPATIENT)
Dept: RADIOLOGY | Facility: OTHER | Age: 62
Discharge: HOME | End: 2022-06-09
Attending: NURSE PRACTITIONER
Payer: MEDICARE

## 2022-06-09 ENCOUNTER — SPECIALTY PHARMACY (OUTPATIENT)
Dept: PHARMACY | Facility: CLINIC | Age: 62
End: 2022-06-09
Payer: MEDICARE

## 2022-06-09 ENCOUNTER — OFFICE VISIT (OUTPATIENT)
Dept: PAIN MEDICINE | Facility: CLINIC | Age: 62
End: 2022-06-09
Payer: MEDICARE

## 2022-06-09 ENCOUNTER — TELEPHONE (OUTPATIENT)
Dept: GASTROENTEROLOGY | Facility: CLINIC | Age: 62
End: 2022-06-09
Payer: MEDICARE

## 2022-06-09 VITALS
TEMPERATURE: 98 F | DIASTOLIC BLOOD PRESSURE: 79 MMHG | BODY MASS INDEX: 29.27 KG/M2 | WEIGHT: 155 LBS | RESPIRATION RATE: 18 BRPM | HEIGHT: 61 IN | SYSTOLIC BLOOD PRESSURE: 131 MMHG | HEART RATE: 87 BPM

## 2022-06-09 DIAGNOSIS — M47.9 OSTEOARTHRITIS OF SPINE, UNSPECIFIED SPINAL OSTEOARTHRITIS COMPLICATION STATUS, UNSPECIFIED SPINAL REGION: ICD-10-CM

## 2022-06-09 DIAGNOSIS — M70.71 ISCHIAL BURSITIS OF RIGHT SIDE: Primary | ICD-10-CM

## 2022-06-09 DIAGNOSIS — M54.16 LUMBAR RADICULOPATHY: ICD-10-CM

## 2022-06-09 DIAGNOSIS — M47.819 SPONDYLOSIS WITHOUT MYELOPATHY OR RADICULOPATHY: ICD-10-CM

## 2022-06-09 DIAGNOSIS — M43.06 LUMBAR SPONDYLOLYSIS: ICD-10-CM

## 2022-06-09 DIAGNOSIS — G89.29 CHRONIC PAIN OF RIGHT KNEE: ICD-10-CM

## 2022-06-09 DIAGNOSIS — M25.561 CHRONIC PAIN OF RIGHT KNEE: ICD-10-CM

## 2022-06-09 DIAGNOSIS — G89.29 CHRONIC HIP PAIN, LEFT: ICD-10-CM

## 2022-06-09 DIAGNOSIS — M25.552 CHRONIC HIP PAIN, LEFT: ICD-10-CM

## 2022-06-09 PROCEDURE — 1160F RVW MEDS BY RX/DR IN RCRD: CPT | Mod: CPTII,S$GLB,, | Performed by: NURSE PRACTITIONER

## 2022-06-09 PROCEDURE — 3078F PR MOST RECENT DIASTOLIC BLOOD PRESSURE < 80 MM HG: ICD-10-PCS | Mod: CPTII,S$GLB,, | Performed by: NURSE PRACTITIONER

## 2022-06-09 PROCEDURE — 99214 PR OFFICE/OUTPT VISIT, EST, LEVL IV, 30-39 MIN: ICD-10-PCS | Mod: S$GLB,,, | Performed by: NURSE PRACTITIONER

## 2022-06-09 PROCEDURE — 1160F PR REVIEW ALL MEDS BY PRESCRIBER/CLIN PHARMACIST DOCUMENTED: ICD-10-PCS | Mod: CPTII,S$GLB,, | Performed by: NURSE PRACTITIONER

## 2022-06-09 PROCEDURE — 3044F PR MOST RECENT HEMOGLOBIN A1C LEVEL <7.0%: ICD-10-PCS | Mod: CPTII,S$GLB,, | Performed by: NURSE PRACTITIONER

## 2022-06-09 PROCEDURE — 3008F BODY MASS INDEX DOCD: CPT | Mod: CPTII,S$GLB,, | Performed by: NURSE PRACTITIONER

## 2022-06-09 PROCEDURE — 3008F PR BODY MASS INDEX (BMI) DOCUMENTED: ICD-10-PCS | Mod: CPTII,S$GLB,, | Performed by: NURSE PRACTITIONER

## 2022-06-09 PROCEDURE — 73562 X-RAY EXAM OF KNEE 3: CPT | Mod: TC,FY,RT

## 2022-06-09 PROCEDURE — 73562 X-RAY EXAM OF KNEE 3: CPT | Mod: 26,RT,, | Performed by: RADIOLOGY

## 2022-06-09 PROCEDURE — 1159F MED LIST DOCD IN RCRD: CPT | Mod: CPTII,S$GLB,, | Performed by: NURSE PRACTITIONER

## 2022-06-09 PROCEDURE — 3075F PR MOST RECENT SYSTOLIC BLOOD PRESS GE 130-139MM HG: ICD-10-PCS | Mod: CPTII,S$GLB,, | Performed by: NURSE PRACTITIONER

## 2022-06-09 PROCEDURE — 3044F HG A1C LEVEL LT 7.0%: CPT | Mod: CPTII,S$GLB,, | Performed by: NURSE PRACTITIONER

## 2022-06-09 PROCEDURE — 3075F SYST BP GE 130 - 139MM HG: CPT | Mod: CPTII,S$GLB,, | Performed by: NURSE PRACTITIONER

## 2022-06-09 PROCEDURE — 99999 PR PBB SHADOW E&M-EST. PATIENT-LVL V: ICD-10-PCS | Mod: PBBFAC,,, | Performed by: NURSE PRACTITIONER

## 2022-06-09 PROCEDURE — 3078F DIAST BP <80 MM HG: CPT | Mod: CPTII,S$GLB,, | Performed by: NURSE PRACTITIONER

## 2022-06-09 PROCEDURE — 1159F PR MEDICATION LIST DOCUMENTED IN MEDICAL RECORD: ICD-10-PCS | Mod: CPTII,S$GLB,, | Performed by: NURSE PRACTITIONER

## 2022-06-09 PROCEDURE — 73562 XR KNEE 3 VIEW RIGHT: ICD-10-PCS | Mod: 26,RT,, | Performed by: RADIOLOGY

## 2022-06-09 PROCEDURE — 99214 OFFICE O/P EST MOD 30 MIN: CPT | Mod: S$GLB,,, | Performed by: NURSE PRACTITIONER

## 2022-06-09 PROCEDURE — 99999 PR PBB SHADOW E&M-EST. PATIENT-LVL V: CPT | Mod: PBBFAC,,, | Performed by: NURSE PRACTITIONER

## 2022-06-09 RX ORDER — TRAMADOL HYDROCHLORIDE 50 MG/1
TABLET ORAL
Qty: 60 TABLET | Refills: 1 | Status: SHIPPED | OUTPATIENT
Start: 2022-06-09 | End: 2022-07-26 | Stop reason: SDUPTHER

## 2022-06-09 NOTE — TELEPHONE ENCOUNTER
----- Message from Lynette Angulo sent at 6/9/2022  4:24 PM CDT -----  Regarding: RE: appointment  Mathew Mazariegos,    Thank you so much for the reminder.    Lynette  ----- Message -----  From: Linda Boone MA  Sent: 6/9/2022  11:50 AM CDT  To: Lynette Angulo  Subject: appointment                                      Hi, just a reminder. Mrs Peterson has an appointment to see  on Tuesday 6/14 for 1:00.  Delilah

## 2022-06-09 NOTE — TELEPHONE ENCOUNTER
Specialty Pharmacy - Refill Coordination    Specialty Medication Orders Linked to Encounter    Flowsheet Row Most Recent Value   Medication #1 sarilumab (KEVZARA) 200 mg/1.14 mL Syrg (Order#[id number], Rx#[id number])          Refill Questions - Documented Responses    Flowsheet Row Most Recent Value   Patient Availability and HIPAA Verification    Does patient want to proceed with activity? Yes   HIPAA/medical authority confirmed? Yes   Relationship to patient of person spoken to? Self   Refill Screening Questions    Changes to allergies? No   Changes to medications? No   New conditions since last clinic visit? No   Unplanned office visit, urgent care, ED, or hospital admission in the last 4 weeks? No   How does patient/caregiver feel medication is working? Good   Financial problems or insurance changes? No   How many doses of your specialty medications were missed in the last 4 weeks? 0   Would patient like to speak to a pharmacist? No   When does the patient need to receive the medication? 06/13/22   Refill Delivery Questions    How will the patient receive the medication? Delivery Jaymie   When does the patient need to receive the medication? 06/13/22   Shipping Address Home   Address in [facility] confirmed and updated if neccessary? Yes   Expected Copay ($) 55   Is the patient able to afford the medication copay? Yes   Payment Method zero copay   Days supply of Refill 28   Supplies needed? No supplies needed   Refill activity completed? Yes   Refill activity plan Refill scheduled   Shipment/Pickup Date: 06/13/22          Current Outpatient Medications   Medication Sig    albuterol (PROVENTIL/VENTOLIN HFA) 90 mcg/actuation inhaler INHALE 2 PUFFS INTO THE LUNGS EVERY 6 (SIX) HOURS AS NEEDED. RESCUE    albuterol-ipratropium (DUO-NEB) 2.5 mg-0.5 mg/3 mL nebulizer solution Take 3 mLs by nebulization every 6 (six) hours as needed for Wheezing. Rescue    aspirin 81 mg Tab Take 81 mg by mouth every morning.      budesonide-formoterol 160-4.5 mcg (SYMBICORT) 160-4.5 mcg/actuation HFAA INHALE 2 PUFFS INTO THE LUNGS EVERY 12 (TWELVE) HOURS.    calcium citrate-vitamin D3 315-200 mg (CITRACAL+D) 315 mg-5 mcg (200 unit) per tablet Take 1 tablet by mouth 2 (two) times daily.     cycloSPORINE (RESTASIS) 0.05 % ophthalmic emulsion Instill one drop into both eyes two times per day    diclofenac sodium (VOLTAREN) 1 % Gel APPLY 2 GRAMS TOPICALLY 4 (FOUR) TIMES DAILY AS NEEDED.    estrogens, conjugated, (PREMARIN) 0.625 MG tablet take 1 tablet by mouth daily    halobetasol propion-tazarotene (DUOBRII) 0.01-0.045 % Lotn aaa on feet and hands qhs  then vaseline and warm towel    INV PROPULSID 10 MG Take 2 tablets (20 mg) by mouth 4 (four) times daily. FOR INVESTIGATIONAL USE ONLY. Protocol [id number]-USA-154  Subject ID: [id number].    ketoconazole (NIZORAL) 2 % cream Apply to feet twice daily for 3 weeks (Patient taking differently: Apply to feet twice daily for 3 weeks)    leflunomide (ARAVA) 20 MG Tab TAKE 1 TABLET BY MOUTH EVERY DAY    lifitegrast (XIIDRA) 5 % Dpet INSTILL ONE DROP INTO BOTH EYES TWICE A DAY (Patient taking differently: INSTILL ONE DROP INTO BOTH EYES TWICE A DAY)    methenamine (HIPREX) 1 gram Tab Take 1 tablet (1 g total) by mouth 2 (two) times daily.    mirabegron (MYRBETRIQ) 25 mg Tb24 ER tablet Take 1 tablet (25 mg total) by mouth once daily.    montelukast (SINGULAIR) 10 mg tablet TAKE 1 TABLET BY MOUTH EVERY DAY AT NIGHT    naproxen (NAPROSYN) 500 MG tablet TAKE 1 TABLET BY MOUTH TWICE A DAY AS NEEDED    omeprazole (PRILOSEC) 40 MG capsule Take 1 capsule (40 mg total) by mouth every morning.    omeprazole-sodium bicarbonate (ZEGERID) 40-1.1 mg-gram per capsule TAKE 1 CAPSULE BY MOUTH EVERY DAY IN THE MORNING    POTASSIUM ORAL Take by mouth once daily.    predniSONE (DELTASONE) 1 MG tablet Take 5 tablets (5 mg total) by mouth once daily.    predniSONE (DELTASONE) 5 MG tablet TAKE 1 TABLET BY MOUTH  "EVERY DAY    pregabalin (LYRICA) 50 MG capsule Take 1 capsule (50 mg total) by mouth every evening.    PREMARIN vaginal cream PLACE 0.5 GRAM VAGINALLY 3 (THREE) TIMES A WEEK.    progesterone (PROMETRIUM) 100 MG capsule Take 1 capsule by mouth daily.    progesterone (PROMETRIUM) 100 MG capsule take 1 capsule by mouth daily    promethazine (PHENERGAN) 25 MG tablet Take 1 tablet (25 mg total) by mouth every 6 (six) hours as needed for Nausea.    rosuvastatin (CRESTOR) 20 MG tablet Take 1 tablet (20 mg total) by mouth every evening.    sarilumab (KEVZARA) 200 mg/1.14 mL Syrg Inject 1.14 mL (200 mg total) into the skin every 14 (fourteen) days.    sucralfate (CARAFATE) 1 gram tablet Take 1 tablet (1 g total) by mouth 4 (four) times daily.    traMADoL (ULTRAM) 50 mg tablet TAKE 1 TABLET (50 MG TOTAL) BY MOUTH EVERY 12 (TWELVE) HOURS AS NEEDED FOR PAIN.   Last reviewed on 6/9/2022  9:35 AM by JAYME Rojo    Review of patient's allergies indicates:   Allergen Reactions    Actemra [tocilizumab] Other (See Comments)     Severe dizziness    Codeine Nausea And Vomiting and Hives    Gold au 198 Hives and Rash    Hydroxychloroquine Other (See Comments)     Can't remember the reaction      Iodinated contrast media Other (See Comments)     Other reaction(s): BURNING ALL OVER    Iodine Other (See Comments) and Hives     Other reaction(s): BURNING ALL OVER - Iodine dye - Not topical    Ondansetron Nausea And Vomiting    Sulfa (sulfonamide antibiotics) Other (See Comments)     Can't remember the reaction    Zofran [ondansetron hcl (pf)] Nausea And Vomiting     Pt reports that last time she received zofran she started vomiting again    Methotrexate analogues Nausea Only    Pneumococcal vaccine Other (See Comments)    Pneumovax 23 [pneumococcal 23-argenis ps vaccine] Other (See Comments)     "sick"    Methotrexate Nausea Only    Last reviewed on  6/9/2022 9:35 AM by Valarie Harman      Tasks added " this encounter   7/4/2022 - Refill Call (Auto Added)   Tasks due within next 3 months   No tasks due.     [facility]  [address]  [facility] [address]  Phone: [phone]  Fax: [phone]

## 2022-06-09 NOTE — PROGRESS NOTES
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 RFA   2 of 2  CONSENT NEEDED;  Surgeon: Tj Mayfield MD;  Location: [facility];  Service: Pain Management;  Laterality: Right;    RADIOFREQUENCY ABLATION Left 4/21/2022    Procedure: Radiofrequency Ablation LEFT L3,L4,L5;  Surgeon: Tj Mayfield MD;  Location: [facility];  Service: Pain Management;  Laterality: Left;    RADIOFREQUENCY ABLATION Right 5/12/2022    Procedure: Radiofrequency Ablation RIGHT L3,L4,L5;  Surgeon: Tj Mayfield MD;  Location: [facility];  Service: Pain Management;  Laterality: Right;    REPAIR OF TRICEPS TENDON Left 10/17/2018    Procedure: REPAIR, TENDON, TRICEPS left elbow;  Surgeon: Staci Yarbrough MD;  Location: [facility] OR 1ST FLR;  Service: Orthopedics;  Laterality: Left;  Anesthesia: General and regional. PRONE, k-wire , hand pan 1 and pan 2, CALL ARTHREX/Tamera notified 10-12 LO    TONSILLECTOMY      TRANSFORAMINAL EPIDURAL INJECTION OF STEROID Right 8/31/2020    Procedure: INJECTION, STEROID, EPIDURAL, TRANSFORAMINAL,  APPROACH, L3-L4 and L4-L5 need consent;  Surgeon: Tj Mayfield MD;  Location: [facility];  Service: Pain Management;  Laterality: Right;    TRANSFORAMINAL EPIDURAL INJECTION OF STEROID Bilateral 7/23/2021    Procedure: INJECTION, STEROID, EPIDURAL, TRANSFORAMINAL APPROACH L4/5;  Surgeon: Tj Mayfield MD;  Location: [facility];  Service: Pain Management;  Laterality: Bilateral;     TRIGGER POINT INJECTION N/A 7/23/2021    Procedure: INJECTION, TRIGGER POINT SCAPULAR;  Surgeon: Tj Mayfield MD;  Location: [facility];  Service: Pain Management;  Laterality: N/A;    TUBAL LIGATION  2003    UPPER GASTROINTESTINAL ENDOSCOPY      10/11    uterine ablation  2003       Family History:  Family History   Problem Relation Age of Onset    Cancer Mother         Lung Cancer    Emphysema Mother     Heart attack Mother     Alcohol abuse Mother     Cancer Father         Lung Cancer    Osteoarthritis Father     Lung cancer Father     Skin cancer Father     Alcohol abuse Father     Heart disease Brother     Heart attack Brother     Alcohol abuse Brother     Cirrhosis Brother     Osteoarthritis Paternal Aunt     Retinal detachment Maternal Aunt     No Known Problems Sister     No Known Problems Maternal Uncle     No Known Problems Paternal Uncle     No Known Problems Maternal Grandmother     No Known Problems Maternal Grandfather     No Known Problems Paternal Grandmother     No Known Problems Paternal Grandfather     Colon cancer Neg Hx     Esophageal cancer Neg Hx     Stomach cancer Neg Hx     Celiac disease Neg Hx     Diabetes Neg Hx     Thyroid disease Neg Hx     Amblyopia Neg Hx     Blindness Neg Hx     Cataracts Neg Hx     Glaucoma Neg Hx     Hypertension Neg Hx     Macular degeneration Neg Hx     Strabismus Neg Hx     Stroke Neg Hx        Social History:  Social History     Socioeconomic History    Marital status:    Tobacco Use    Smoking status: Never Smoker    Smokeless tobacco: Never Used   Substance and Sexual Activity    Alcohol use: Yes     Comment: 2-3 times per year.    Drug use: No    Sexual activity: Yes     Partners: Male     Birth control/protection: Surgical     Social Determinants of Health     Financial Resource Strain: Low Risk     Difficulty of Paying Living Expenses: Not hard at all   Food Insecurity: No Food Insecurity    Worried  "About Running Out of Food in the Last Year: Never true    Ran Out of Food in the Last Year: Never true   Transportation Needs: No Transportation Needs    Lack of Transportation (Medical): No    Lack of Transportation (Non-Medical): No   Physical Activity: Unknown    Days of Exercise per Week: 2 days   Stress: No Stress Concern Present    Feeling of Stress : Not at all   Social Connections: Unknown    Frequency of Communication with Friends and Family: More than three times a week    Frequency of Social Gatherings with Friends and Family: Twice a week    Active Member of Clubs or Organizations: Yes    Attends Club or Organization Meetings: 1 to 4 times per year    Marital Status:    Housing Stability: Low Risk     Unable to Pay for Housing in the Last Year: No    Number of Places Lived in the Last Year: 1    Unstable Housing in the Last Year: No       OBJECTIVE:    /79 (BP Location: Left arm, Patient Position: Sitting, BP Method: Medium (Automatic))   Pulse 87   Temp 98 °F (36.7 °C) (Oral)   Resp 18   Ht 5' 1" (1.549 m)   Wt 70.3 kg (155 lb)   LMP  (LMP Unknown)   BMI 29.29 kg/m²     PHYSICAL EXAMINATION:    General appearance: Well appearing, in no acute distress, alert and oriented x3.  Psych:  Mood and affect appropriate.  Back: TTP over lumbar facet joints bilaterally. Limited ROM with pain on lumbar extension and flexion.  Positive facet loading bilaterally L>R. Pain on extension and flexion.  Negative SLR bilaterally.   MSK: TTP to right ischial bursa which reproduces radicular symptoms. TTP to lateral right knee with mild effusion. Pain on extension. No laxity or instability on exam.  Neuro: No loss of sensation.  Kelsey is negative bilaterally.  Gait: Antalgic- ambulates without assistance.    ASSESSMENT: 61 y.o. year old female with right sided back and hip pain, consistent with the following diagnoses:     1. Chronic pain of right knee  X-Ray Knee 3 View Right   2. " Osteoarthritis of spine, unspecified spinal osteoarthritis complication status, unspecified spinal region     3. Spondylosis without myelopathy or radiculopathy     4. Lumbar spondylolysis     5. Lumbar radiculopathy           PLAN:     - Previous imaging was reviewed and discussed with the patient today.    - I will order right knee XRAYs given recent fall.     - Schedule for right ischial bursa injection.    - Can continue Tramadol 50 mg PRN. Refilled today. She takes one pill daily usually.     - RTC 2 weeks after procedure.    - Counseled patient regarding the importance of constant sleeping habits and physical therapy.    - Dr. Mayfield was consulted on the patient and agrees with this plan.        The above plan and management options were discussed at length with patient. Patient is in agreement with the above and verbalized understanding.    Valarie Law  06/09/2022

## 2022-06-11 RX ORDER — FLUCONAZOLE 100 MG/1
TABLET ORAL
Qty: 14 TABLET | Refills: 0 | Status: SHIPPED | OUTPATIENT
Start: 2022-06-11 | End: 2022-07-13 | Stop reason: SDUPTHER

## 2022-06-13 ENCOUNTER — PATIENT MESSAGE (OUTPATIENT)
Dept: GASTROENTEROLOGY | Facility: CLINIC | Age: 62
End: 2022-06-13
Payer: MEDICARE

## 2022-06-14 ENCOUNTER — LAB VISIT (OUTPATIENT)
Dept: LAB | Facility: HOSPITAL | Age: 62
End: 2022-06-14
Attending: INTERNAL MEDICINE
Payer: MEDICARE

## 2022-06-14 ENCOUNTER — OFFICE VISIT (OUTPATIENT)
Dept: GASTROENTEROLOGY | Facility: CLINIC | Age: 62
End: 2022-06-14
Payer: MEDICARE

## 2022-06-14 ENCOUNTER — HOSPITAL ENCOUNTER (OUTPATIENT)
Dept: CARDIOLOGY | Facility: CLINIC | Age: 62
Discharge: HOME | End: 2022-06-14
Payer: MEDICARE

## 2022-06-14 VITALS
HEART RATE: 107 BPM | HEIGHT: 61 IN | DIASTOLIC BLOOD PRESSURE: 86 MMHG | WEIGHT: 156.06 LBS | BODY MASS INDEX: 29.47 KG/M2 | SYSTOLIC BLOOD PRESSURE: 139 MMHG

## 2022-06-14 DIAGNOSIS — K21.9 GASTROESOPHAGEAL REFLUX DISEASE WITHOUT ESOPHAGITIS: ICD-10-CM

## 2022-06-14 DIAGNOSIS — K31.84 GASTROPARESIS: Primary | ICD-10-CM

## 2022-06-14 DIAGNOSIS — K31.84 GASTROPARESIS: ICD-10-CM

## 2022-06-14 LAB
ALBUMIN SERPL BCP-MCNC: 3.5 G/DL (ref 3.5–5.2)
ALP SERPL-CCNC: 67 U/L (ref 55–135)
ALT SERPL W/O P-5'-P-CCNC: 19 U/L (ref 10–44)
ANION GAP SERPL CALC-SCNC: 10 MMOL/L (ref 8–16)
AST SERPL-CCNC: 21 U/L (ref 10–40)
BASOPHILS # BLD AUTO: 0.06 K/UL (ref 0–0.2)
BASOPHILS NFR BLD: 1.2 % (ref 0–1.9)
BILIRUB SERPL-MCNC: 0.4 MG/DL (ref 0.1–1)
BUN SERPL-MCNC: 11 MG/DL (ref 8–23)
CALCIUM SERPL-MCNC: 9.2 MG/DL (ref 8.7–10.5)
CHLORIDE SERPL-SCNC: 106 MMOL/L (ref 95–110)
CO2 SERPL-SCNC: 24 MMOL/L (ref 23–29)
CREAT SERPL-MCNC: 0.7 MG/DL (ref 0.5–1.4)
DIFFERENTIAL METHOD: ABNORMAL
EOSINOPHIL # BLD AUTO: 0 K/UL (ref 0–0.5)
EOSINOPHIL NFR BLD: 0.2 % (ref 0–8)
ERYTHROCYTE [DISTWIDTH] IN BLOOD BY AUTOMATED COUNT: 13.4 % (ref 11.5–14.5)
EST. GFR  (AFRICAN AMERICAN): >60 ML/MIN/1.73 M^2
EST. GFR  (NON AFRICAN AMERICAN): >60 ML/MIN/1.73 M^2
GLUCOSE SERPL-MCNC: 101 MG/DL (ref 70–110)
HCT VFR BLD AUTO: 41.1 % (ref 37–48.5)
HGB BLD-MCNC: 13.1 G/DL (ref 12–16)
IMM GRANULOCYTES # BLD AUTO: 0.01 K/UL (ref 0–0.04)
IMM GRANULOCYTES NFR BLD AUTO: 0.2 % (ref 0–0.5)
LYMPHOCYTES # BLD AUTO: 0.9 K/UL (ref 1–4.8)
LYMPHOCYTES NFR BLD: 18 % (ref 18–48)
MAGNESIUM SERPL-MCNC: 2.2 MG/DL (ref 1.6–2.6)
MCH RBC QN AUTO: 30.4 PG (ref 27–31)
MCHC RBC AUTO-ENTMCNC: 31.9 G/DL (ref 32–36)
MCV RBC AUTO: 95 FL (ref 82–98)
MONOCYTES # BLD AUTO: 0.8 K/UL (ref 0.3–1)
MONOCYTES NFR BLD: 15.2 % (ref 4–15)
NEUTROPHILS # BLD AUTO: 3.3 K/UL (ref 1.8–7.7)
NEUTROPHILS NFR BLD: 65.2 % (ref 38–73)
NRBC BLD-RTO: 0 /100 WBC
PHOSPHATE SERPL-MCNC: 2.5 MG/DL (ref 2.7–4.5)
PLATELET # BLD AUTO: 228 K/UL (ref 150–450)
PMV BLD AUTO: 11.2 FL (ref 9.2–12.9)
POTASSIUM SERPL-SCNC: 4.2 MMOL/L (ref 3.5–5.1)
PROT SERPL-MCNC: 6.1 G/DL (ref 6–8.4)
RBC # BLD AUTO: 4.31 M/UL (ref 4–5.4)
SODIUM SERPL-SCNC: 140 MMOL/L (ref 136–145)
WBC # BLD AUTO: 5 K/UL (ref 3.9–12.7)

## 2022-06-14 PROCEDURE — 36415 COLL VENOUS BLD VENIPUNCTURE: CPT | Performed by: INTERNAL MEDICINE

## 2022-06-14 PROCEDURE — 3044F HG A1C LEVEL LT 7.0%: CPT | Mod: CPTII,S$GLB,, | Performed by: INTERNAL MEDICINE

## 2022-06-14 PROCEDURE — 3079F DIAST BP 80-89 MM HG: CPT | Mod: CPTII,S$GLB,, | Performed by: INTERNAL MEDICINE

## 2022-06-14 PROCEDURE — 3075F SYST BP GE 130 - 139MM HG: CPT | Mod: CPTII,S$GLB,, | Performed by: INTERNAL MEDICINE

## 2022-06-14 PROCEDURE — 1159F PR MEDICATION LIST DOCUMENTED IN MEDICAL RECORD: ICD-10-PCS | Mod: CPTII,S$GLB,, | Performed by: INTERNAL MEDICINE

## 2022-06-14 PROCEDURE — 1160F RVW MEDS BY RX/DR IN RCRD: CPT | Mod: CPTII,S$GLB,, | Performed by: INTERNAL MEDICINE

## 2022-06-14 PROCEDURE — 3079F PR MOST RECENT DIASTOLIC BLOOD PRESSURE 80-89 MM HG: ICD-10-PCS | Mod: CPTII,S$GLB,, | Performed by: INTERNAL MEDICINE

## 2022-06-14 PROCEDURE — 93005 EKG 12-LEAD: ICD-10-PCS | Mod: S$GLB,,, | Performed by: INTERNAL MEDICINE

## 2022-06-14 PROCEDURE — 84100 ASSAY OF PHOSPHORUS: CPT | Performed by: INTERNAL MEDICINE

## 2022-06-14 PROCEDURE — 83735 ASSAY OF MAGNESIUM: CPT | Performed by: INTERNAL MEDICINE

## 2022-06-14 PROCEDURE — 85025 COMPLETE CBC W/AUTO DIFF WBC: CPT | Performed by: INTERNAL MEDICINE

## 2022-06-14 PROCEDURE — 93005 ELECTROCARDIOGRAM TRACING: CPT | Mod: S$GLB,,, | Performed by: INTERNAL MEDICINE

## 2022-06-14 PROCEDURE — 93010 ELECTROCARDIOGRAM REPORT: CPT | Mod: S$GLB,,, | Performed by: INTERNAL MEDICINE

## 2022-06-14 PROCEDURE — 99214 OFFICE O/P EST MOD 30 MIN: CPT | Mod: S$GLB,,, | Performed by: INTERNAL MEDICINE

## 2022-06-14 PROCEDURE — 3044F PR MOST RECENT HEMOGLOBIN A1C LEVEL <7.0%: ICD-10-PCS | Mod: CPTII,S$GLB,, | Performed by: INTERNAL MEDICINE

## 2022-06-14 PROCEDURE — 3075F PR MOST RECENT SYSTOLIC BLOOD PRESS GE 130-139MM HG: ICD-10-PCS | Mod: CPTII,S$GLB,, | Performed by: INTERNAL MEDICINE

## 2022-06-14 PROCEDURE — 80053 COMPREHEN METABOLIC PANEL: CPT | Performed by: INTERNAL MEDICINE

## 2022-06-14 PROCEDURE — 99999 PR PBB SHADOW E&M-EST. PATIENT-LVL IV: CPT | Mod: PBBFAC,,, | Performed by: INTERNAL MEDICINE

## 2022-06-14 PROCEDURE — 1160F PR REVIEW ALL MEDS BY PRESCRIBER/CLIN PHARMACIST DOCUMENTED: ICD-10-PCS | Mod: CPTII,S$GLB,, | Performed by: INTERNAL MEDICINE

## 2022-06-14 PROCEDURE — 1159F MED LIST DOCD IN RCRD: CPT | Mod: CPTII,S$GLB,, | Performed by: INTERNAL MEDICINE

## 2022-06-14 PROCEDURE — 3008F BODY MASS INDEX DOCD: CPT | Mod: CPTII,S$GLB,, | Performed by: INTERNAL MEDICINE

## 2022-06-14 PROCEDURE — 93010 EKG 12-LEAD: ICD-10-PCS | Mod: S$GLB,,, | Performed by: INTERNAL MEDICINE

## 2022-06-14 PROCEDURE — 3008F PR BODY MASS INDEX (BMI) DOCUMENTED: ICD-10-PCS | Mod: CPTII,S$GLB,, | Performed by: INTERNAL MEDICINE

## 2022-06-14 PROCEDURE — 99999 PR PBB SHADOW E&M-EST. PATIENT-LVL IV: ICD-10-PCS | Mod: PBBFAC,,, | Performed by: INTERNAL MEDICINE

## 2022-06-14 PROCEDURE — 99214 PR OFFICE/OUTPT VISIT, EST, LEVL IV, 30-39 MIN: ICD-10-PCS | Mod: S$GLB,,, | Performed by: INTERNAL MEDICINE

## 2022-06-14 NOTE — PROGRESS NOTES
Subjective:       Patient ID: Joyce Peterson is a 61 y.o. female.    Chief Complaint: Follow-up (gastroparesis)    HPI     61-year-old lady with longstanding gastroparesis.  On the compassionate Propulsid trial.  Related to her gastroparesis she has significant reflux symptoms.  She continues with symptoms that she has had in the past.  She has early satiety.  She has prolonged fullness after eating.  She often has regurgitation after meals.  Pyrosis is kept at a minimum right now.  Often delayed regurgitation at night.  Very much related to what she eats and how much she eats.  She has very difficult time tolerating even small amounts of spicy food.  Normal bowel movements.  No nausea vomiting.  She acknowledges that the Propulsid helps her significantly.  She has to get off it every once a while when she takes Diflucan and immediately she will notice a difference in the amount of satiety fullness and reflux.    On review of systems no symptoms of concern as they relate to Propulsid specifically.  No palpitations, chest pain, syncope dizziness or shortness of breath.    Past Medical History:   Diagnosis Date    Acid reflux     Allergy     Anemia     Asthma     Coronary artery disease     CS (cervical spondylosis) 3/8/2013    Degenerative disc disease     Dry eyes     Dry mouth     Gastroparesis     History of methotrexate therapy 1/19/2022    Hyperlipidemia     Lateral meniscus derangement 4/6/2016    Lobular carcinoma in situ     Lumbar spondylosis 3/8/2013    Osteoarthritis     Osteoporosis     Rheumatoid arthritis(714.0)     Rupture of left triceps tendon 10/17/2018    Umbilical hernia 8/13/2015       Review of patient's allergies indicates:   Allergen Reactions    Actemra [tocilizumab] Other (See Comments)     Severe dizziness    Codeine Nausea And Vomiting and Hives    Gold au 198 Hives and Rash    Hydroxychloroquine Other (See Comments)     Can't remember the reaction       "Iodinated contrast media Other (See Comments)     Other reaction(s): BURNING ALL OVER    Iodine Other (See Comments) and Hives     Other reaction(s): BURNING ALL OVER - Iodine dye - Not topical    Ondansetron Nausea And Vomiting    Sulfa (sulfonamide antibiotics) Other (See Comments)     Can't remember the reaction    Zofran [ondansetron hcl (pf)] Nausea And Vomiting     Pt reports that last time she received zofran she started vomiting again    Methotrexate analogues Nausea Only    Pneumococcal vaccine Other (See Comments)    Pneumovax 23 [pneumococcal 23-argenis ps vaccine] Other (See Comments)     "sick"    Methotrexate Nausea Only        Family History   Problem Relation Age of Onset    Cancer Mother         Lung Cancer    Emphysema Mother     Heart attack Mother     Alcohol abuse Mother     Cancer Father         Lung Cancer    Osteoarthritis Father     Lung cancer Father     Skin cancer Father     Alcohol abuse Father     Heart disease Brother     Heart attack Brother     Alcohol abuse Brother     Cirrhosis Brother     Osteoarthritis Paternal Aunt     Retinal detachment Maternal Aunt     No Known Problems Sister     No Known Problems Maternal Uncle     No Known Problems Paternal Uncle     No Known Problems Maternal Grandmother     No Known Problems Maternal Grandfather     No Known Problems Paternal Grandmother     No Known Problems Paternal Grandfather     Colon cancer Neg Hx     Esophageal cancer Neg Hx     Stomach cancer Neg Hx     Celiac disease Neg Hx     Diabetes Neg Hx     Thyroid disease Neg Hx     Amblyopia Neg Hx     Blindness Neg Hx     Cataracts Neg Hx     Glaucoma Neg Hx     Hypertension Neg Hx     Macular degeneration Neg Hx     Strabismus Neg Hx     Stroke Neg Hx        Social History     Tobacco Use    Smoking status: Never Smoker    Smokeless tobacco: Never Used   Substance Use Topics    Alcohol use: Yes     Comment: 2-3 times per year.    Drug use: " No        Review of Systems   Constitutional: Negative for activity change, appetite change, chills, diaphoresis, fatigue, fever and unexpected weight change.   HENT: Negative for nasal congestion, ear pain, mouth sores, nosebleeds, postnasal drip, rhinorrhea, sinus pressure/congestion, sore throat, trouble swallowing and voice change.    Eyes: Negative for pain.   Respiratory: Negative for cough, shortness of breath and wheezing.    Cardiovascular: Negative for chest pain, palpitations and leg swelling.   Genitourinary: Negative for difficulty urinating, dysuria, flank pain, hematuria and menstrual problem.   Musculoskeletal: Positive for arthralgias, gait problem, joint swelling and joint deformity. Negative for back pain, myalgias and neck pain.   Integumentary:  Negative for rash.   Neurological: Negative for dizziness, tremors, syncope, numbness and headaches.   Hematological: Negative for adenopathy. Does not bruise/bleed easily.   Psychiatric/Behavioral: Negative for agitation, behavioral problems, confusion, decreased concentration and dysphoric mood. The patient is not nervous/anxious.        CMP   Lab Results   Component Value Date     06/14/2022     03/15/2022    K 4.2 06/14/2022    K 4.1 03/15/2022     06/14/2022     03/15/2022    CO2 24 06/14/2022    CO2 23 03/15/2022     06/14/2022    GLU 99 03/15/2022    BUN 11 06/14/2022    BUN 14 03/15/2022    CREATININE 0.7 06/14/2022    CREATININE 0.79 03/15/2022    CALCIUM 9.2 06/14/2022    CALCIUM 8.9 05/30/2022    CALCIUM 8.7 03/15/2022    PROT 6.1 06/14/2022    PROT 6.6 03/15/2022    ALBUMIN 3.5 06/14/2022    ALBUMIN 4.2 03/15/2022    BILITOT 0.4 06/14/2022    BILITOT 0.5 03/15/2022    ALKPHOS 67 06/14/2022    ALKPHOS 66 03/15/2022    AST 21 06/14/2022    AST 43 03/15/2022    ALT 19 06/14/2022    ALT 34 03/15/2022    ANIONGAP 10 06/14/2022    ANIONGAP 8 03/15/2022    ESTGFRAFRICA >60.0 06/14/2022    ESTGFRAFRICA >60.0 03/15/2022     EGFRNONAA >60.0 06/14/2022    EGFRNONAA >60.0 03/15/2022    and CBC   Lab Results   Component Value Date    WBC 5.00 06/14/2022    WBC 5.19 03/15/2022    HGB 13.1 06/14/2022    HGB 13.9 03/15/2022    HCT 41.1 06/14/2022     06/14/2022     03/15/2022       US Abdomen Complete 01/11/2022    Narrative  EXAMINATION:  US ABDOMEN COMPLETE    CLINICAL HISTORY:  Abnormal levels of other serum enzymes    TECHNIQUE:  Complete abdominal ultrasound (including pancreas, aorta, liver, gallbladder, common bile duct, IVC, kidneys, and spleen) was performed.    COMPARISON:  08/02/2021    FINDINGS:  Pancreas: The visualized portions of pancreas appear normal.    Aorta: No aneurysm.    Gallbladder: The gallbladder is surgically absent.    Biliary system: 5.5 mm common bile duct.  No intrahepatic ductal dilatation.    Liver: 14.1 cm, homogeneous parenchymal echotexture. No focal lesions.    Inferior vena cava: Normal in appearance.    Right kidney: 11 cm. No hydronephrosis.    Left kidney: 11.5 cm. No hydronephrosis.    Spleen: 8 cm.  Normal in size with homogeneous echotexture.    Miscellaneous: No ascites.    Impression  No sonographic abnormality demonstrated.      Electronically signed by: Donovan Santos MD  Date:    01/11/2022  Time:    10:52      US Abdomen Complete 08/02/2021    Narrative  EXAMINATION:  US ABDOMEN COMPLETE    CLINICAL HISTORY:  Other long term (current) drug therapy    FINDINGS:  Pancreas aorta and IVC are unremarkable.  Gallbladder has been removed.  Bile duct measures 3 mm.  The liver measures 14 cm, the right kidney 11.6, the left kidney 11.5, the spleen 8.1.  Kidneys and spleen are normal.  Liver is unremarkable.  No bile duct dilatation seen.    Impression  No significant abnormality seen.      Electronically signed by: Julio César Bolton MD  Date:    08/02/2021  Time:    11:51             Objective:      Physical Exam  Constitutional:       General: She is not in acute distress.      Appearance: She is well-developed.   HENT:      Head: Normocephalic and atraumatic.      Right Ear: External ear normal.      Left Ear: External ear normal.      Nose: Nose normal.      Mouth/Throat:      Pharynx: No oropharyngeal exudate.   Eyes:      General: No scleral icterus.     Conjunctiva/sclera: Conjunctivae normal.      Pupils: Pupils are equal, round, and reactive to light.   Neck:      Thyroid: No thyromegaly.   Cardiovascular:      Rate and Rhythm: Normal rate and regular rhythm.      Heart sounds: Normal heart sounds. No murmur heard.    No gallop.   Pulmonary:      Effort: Pulmonary effort is normal.      Breath sounds: Normal breath sounds. No wheezing or rales.   Abdominal:      General: Abdomen is protuberant. Bowel sounds are normal. There is no distension. There are no signs of injury.      Palpations: Abdomen is soft. There is no shifting dullness, hepatomegaly or mass.      Tenderness: There is no abdominal tenderness.   Musculoskeletal:         General: No tenderness. Normal range of motion.      Cervical back: Normal range of motion and neck supple.   Lymphadenopathy:      Cervical: No cervical adenopathy.   Skin:     General: Skin is warm and dry.      Findings: No rash.   Neurological:      Mental Status: She is alert and oriented to person, place, and time.      Cranial Nerves: No cranial nerve deficit.   Psychiatric:         Behavior: Behavior normal.         Thought Content: Thought content normal.         Judgment: Judgment normal.         Assessment & Plan:       Gastroparesis    Gastroesophageal reflux disease without esophagitis     Assessment.  1. Gastroparesis  2. Reflux  3. Participation in research study    She has very symptomatic gastroparesis but the Propulsid is giving her considerable relief and much improved quality of life.  She desires to continue with her participation in the study.  I have reviewed her lab work and her EKG as well as completing the study forms to enable  the continued participation.  I think the patient is driving significant benefit from the use of Propulsid    This note was created with voice recognition dictation technology.  There may be errors that I did not see, detect or correct.      López Moore MD

## 2022-06-17 DIAGNOSIS — R52 PAIN: Primary | ICD-10-CM

## 2022-06-21 ENCOUNTER — PATIENT MESSAGE (OUTPATIENT)
Dept: ORTHOPEDICS | Facility: CLINIC | Age: 62
End: 2022-06-21
Payer: MEDICARE

## 2022-06-21 ENCOUNTER — PATIENT MESSAGE (OUTPATIENT)
Dept: PAIN MEDICINE | Facility: CLINIC | Age: 62
End: 2022-06-21
Payer: MEDICARE

## 2022-06-22 ENCOUNTER — TELEPHONE (OUTPATIENT)
Dept: UROLOGY | Facility: CLINIC | Age: 62
End: 2022-06-22
Payer: MEDICARE

## 2022-06-22 ENCOUNTER — PATIENT MESSAGE (OUTPATIENT)
Dept: UROLOGY | Facility: CLINIC | Age: 62
End: 2022-06-22
Payer: MEDICARE
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
## 2022-06-22 DIAGNOSIS — R39.15 URINARY URGENCY: Primary | ICD-10-CM

## 2022-06-22 DIAGNOSIS — M25.561 ACUTE PAIN OF RIGHT KNEE: Primary | ICD-10-CM

## 2022-06-23 ENCOUNTER — OFFICE VISIT (OUTPATIENT)
Dept: RHEUMATOLOGY | Facility: CLINIC | Age: 62
End: 2022-06-23
Payer: MEDICARE

## 2022-06-23 ENCOUNTER — LAB VISIT (OUTPATIENT)
Dept: LAB | Facility: HOSPITAL | Age: 62
End: 2022-06-23
Attending: UROLOGY
Payer: MEDICARE

## 2022-06-23 VITALS
DIASTOLIC BLOOD PRESSURE: 87 MMHG | WEIGHT: 157.44 LBS | BODY MASS INDEX: 29.72 KG/M2 | SYSTOLIC BLOOD PRESSURE: 145 MMHG | HEART RATE: 107 BPM | HEIGHT: 61 IN

## 2022-06-23 DIAGNOSIS — M79.7 FIBROMYALGIA: ICD-10-CM

## 2022-06-23 DIAGNOSIS — M19.011 OSTEOARTHRITIS OF RIGHT SHOULDER, UNSPECIFIED OSTEOARTHRITIS TYPE: ICD-10-CM

## 2022-06-23 DIAGNOSIS — M81.0 OSTEOPOROSIS, UNSPECIFIED OSTEOPOROSIS TYPE, UNSPECIFIED PATHOLOGICAL FRACTURE PRESENCE: ICD-10-CM

## 2022-06-23 DIAGNOSIS — M17.11 OSTEOARTHRITIS OF RIGHT KNEE, UNSPECIFIED OSTEOARTHRITIS TYPE: ICD-10-CM

## 2022-06-23 DIAGNOSIS — R39.15 URINARY URGENCY: ICD-10-CM

## 2022-06-23 DIAGNOSIS — E78.5 HYPERLIPIDEMIA, UNSPECIFIED HYPERLIPIDEMIA TYPE: Primary | ICD-10-CM

## 2022-06-23 DIAGNOSIS — M06.9 RHEUMATOID ARTHRITIS: ICD-10-CM

## 2022-06-23 DIAGNOSIS — I25.10 CORONARY ARTERY DISEASE INVOLVING NATIVE CORONARY ARTERY OF NATIVE HEART WITHOUT ANGINA PECTORIS: Chronic | ICD-10-CM

## 2022-06-23 LAB
BILIRUB UR QL STRIP: NEGATIVE
CLARITY UR REFRACT.AUTO: CLEAR
COLOR UR AUTO: NORMAL
GLUCOSE UR QL STRIP: NEGATIVE
HGB UR QL STRIP: NEGATIVE
KETONES UR QL STRIP: NEGATIVE
LEUKOCYTE ESTERASE UR QL STRIP: NEGATIVE
NITRITE UR QL STRIP: NEGATIVE
PH UR STRIP: 6 [PH] (ref 5–8)
PROT UR QL STRIP: NEGATIVE
SP GR UR STRIP: 1 (ref 1–1.03)
URN SPEC COLLECT METH UR: NORMAL

## 2022-06-23 PROCEDURE — 99213 PR OFFICE/OUTPT VISIT, EST, LEVL III, 20-29 MIN: ICD-10-PCS | Mod: S$GLB,,, | Performed by: INTERNAL MEDICINE

## 2022-06-23 PROCEDURE — 3077F PR MOST RECENT SYSTOLIC BLOOD PRESSURE >= 140 MM HG: ICD-10-PCS | Mod: CPTII,S$GLB,, | Performed by: INTERNAL MEDICINE

## 2022-06-23 PROCEDURE — 3008F PR BODY MASS INDEX (BMI) DOCUMENTED: ICD-10-PCS | Mod: CPTII,S$GLB,, | Performed by: INTERNAL MEDICINE

## 2022-06-23 PROCEDURE — 3079F DIAST BP 80-89 MM HG: CPT | Mod: CPTII,S$GLB,, | Performed by: INTERNAL MEDICINE

## 2022-06-23 PROCEDURE — 99999 PR PBB SHADOW E&M-EST. PATIENT-LVL V: CPT | Mod: PBBFAC,,, | Performed by: INTERNAL MEDICINE

## 2022-06-23 PROCEDURE — 81003 URINALYSIS AUTO W/O SCOPE: CPT | Performed by: UROLOGY

## 2022-06-23 PROCEDURE — 87086 URINE CULTURE/COLONY COUNT: CPT | Performed by: UROLOGY

## 2022-06-23 PROCEDURE — 3008F BODY MASS INDEX DOCD: CPT | Mod: CPTII,S$GLB,, | Performed by: INTERNAL MEDICINE

## 2022-06-23 PROCEDURE — 3079F PR MOST RECENT DIASTOLIC BLOOD PRESSURE 80-89 MM HG: ICD-10-PCS | Mod: CPTII,S$GLB,, | Performed by: INTERNAL MEDICINE

## 2022-06-23 PROCEDURE — 3044F HG A1C LEVEL LT 7.0%: CPT | Mod: CPTII,S$GLB,, | Performed by: INTERNAL MEDICINE

## 2022-06-23 PROCEDURE — 1159F MED LIST DOCD IN RCRD: CPT | Mod: CPTII,S$GLB,, | Performed by: INTERNAL MEDICINE

## 2022-06-23 PROCEDURE — 99999 PR PBB SHADOW E&M-EST. PATIENT-LVL V: ICD-10-PCS | Mod: PBBFAC,,, | Performed by: INTERNAL MEDICINE

## 2022-06-23 PROCEDURE — 1159F PR MEDICATION LIST DOCUMENTED IN MEDICAL RECORD: ICD-10-PCS | Mod: CPTII,S$GLB,, | Performed by: INTERNAL MEDICINE

## 2022-06-23 PROCEDURE — 3077F SYST BP >= 140 MM HG: CPT | Mod: CPTII,S$GLB,, | Performed by: INTERNAL MEDICINE

## 2022-06-23 PROCEDURE — 99213 OFFICE O/P EST LOW 20 MIN: CPT | Mod: S$GLB,,, | Performed by: INTERNAL MEDICINE

## 2022-06-23 PROCEDURE — 3044F PR MOST RECENT HEMOGLOBIN A1C LEVEL <7.0%: ICD-10-PCS | Mod: CPTII,S$GLB,, | Performed by: INTERNAL MEDICINE

## 2022-06-23 RX ORDER — PREDNISONE 5 MG/1
5 TABLET ORAL DAILY
Qty: 90 TABLET | Refills: 1 | Status: SHIPPED | OUTPATIENT
Start: 2022-06-23 | End: 2022-10-06

## 2022-06-23 RX ORDER — ROSUVASTATIN CALCIUM 20 MG/1
20 TABLET, COATED ORAL NIGHTLY
Qty: 90 TABLET | Refills: 3 | Status: SHIPPED | OUTPATIENT
Start: 2022-06-23 | End: 2023-08-15

## 2022-06-23 RX ORDER — DIAZEPAM 10 MG/1
10 TABLET ORAL
Qty: 1 TABLET | Refills: 0 | Status: SHIPPED | OUTPATIENT
Start: 2022-06-23 | End: 2022-08-01

## 2022-06-23 RX ORDER — PREGABALIN 50 MG/1
50 CAPSULE ORAL NIGHTLY
Qty: 90 CAPSULE | Refills: 1 | Status: SHIPPED | OUTPATIENT
Start: 2022-06-23 | End: 2022-07-26

## 2022-06-23 RX ORDER — NAPROXEN 500 MG/1
500 TABLET ORAL 2 TIMES DAILY PRN
Qty: 180 TABLET | Refills: 0 | Status: SHIPPED | OUTPATIENT
Start: 2022-06-23 | End: 2022-10-06 | Stop reason: SDUPTHER

## 2022-06-23 RX ORDER — LEFLUNOMIDE 20 MG/1
20 TABLET ORAL DAILY
Qty: 90 TABLET | Refills: 0 | Status: SHIPPED | OUTPATIENT
Start: 2022-06-23 | End: 2022-10-06 | Stop reason: SDUPTHER

## 2022-06-23 ASSESSMENT — ROUTINE ASSESSMENT OF PATIENT INDEX DATA (RAPID3)
MDHAQ FUNCTION SCORE: 1.9
PSYCHOLOGICAL DISTRESS SCORE: 3.3
FATIGUE SCORE: 7.5
PAIN SCORE: 9
TOTAL RAPID3 SCORE: 8.11
PATIENT GLOBAL ASSESSMENT SCORE: 9
AM STIFFNESS SCORE: 1, YES
WHEN YOU AWAKENED IN THE MORNING OVER THE LAST WEEK, PLEASE INDICATE THE AMOUNT OF TIME IT TAKES UNTIL YOU ARE AS LIMBER AS YOU WILL BE FOR THE DAY: 24 HOURS

## 2022-06-23 NOTE — PROGRESS NOTES
"Subjective:       Patient ID: Joyce Peterson is a 61 y.o. female.    Chief Complaint: RA; secondary OA; fibromyalgia; osteoporosis    62 yo female presenting for follow-up of RA, fibromyalgia and osteoporosis. V 3/23/22 where she was doing well, no changes.     She reports her RA has been well controlled over the past three months on sarilumab 200 mg sc q 2 wks, leflunomide 20mg daily and prednisone 5 mg daily.      Today, she is doing okay. Hurting in all joints and muscles, specifically buttocks and thighs. Severe cramps in right calf at night for the past 2 weeks every single night. No swollen joints. Uses Xiidra, restasis, OTC eye drops for dry eyes which gives mild relief. Had alopecia 2 months ago but now is resolved. Dry mouth. Had 2 mouth ulcers after receiving last infusion, now healed. Morning stiffness lasts all day.    Denies alopecia, nasal/oral ulcers, lesions/rashes, dysphagia, appetite change.    Exercise: was doing yoga 3-4 times weekly but last 2 weeks nothing, no kelly chi, stationary bike 3 times weekly for 15 minutes     Review of Systems   Constitutional: Negative for fever and unexpected weight change.   HENT: Positive for postnasal drip and sinus pressure. Negative for mouth sores and trouble swallowing.    Eyes: Negative for redness and visual disturbance.   Respiratory: Negative for cough and shortness of breath.    Cardiovascular: Negative for chest pain and leg swelling.   Gastrointestinal: Negative for anal bleeding, constipation, diarrhea and vomiting.   Genitourinary: Negative for dysuria, genital sores and hematuria.   Skin: Negative for rash.   Neurological: Negative for headaches.   Hematological: Does not bruise/bleed easily.   Psychiatric/Behavioral: Negative.          Objective:   BP (!) 145/87   Pulse 107   Ht 5' 1" (1.549 m)   Wt 71.4 kg (157 lb 6.5 oz)   LMP  (LMP Unknown)   BMI 29.74 kg/m²          Physical Exam   Constitutional: She is oriented to person, place, and " time.   HENT:   Head: Normocephalic and atraumatic.   Eyes: Conjunctivae are normal.   Cardiovascular: Normal rate, regular rhythm and normal heart sounds. Exam reveals no gallop.   No murmur heard.  Pulmonary/Chest: Effort normal and breath sounds normal. No respiratory distress. She has no wheezes. She has no rales.   Abdominal: Soft. Normal appearance. She exhibits no distension. There is no abdominal tenderness.   Musculoskeletal:      Right lower leg: No edema.      Left lower leg: No edema.   Neurological: She is alert and oriented to person, place, and time.   Skin: Skin is warm and dry. No lesion and no rash noted.       Right Side Rheumatological Exam     Examination finds the 1st PIP, 1st MCP, 2nd PIP, 2nd MCP, 3rd PIP, 3rd MCP, 4th PIP, 4th MCP, 5th PIP and 5th MCP normal.    The patient has an enlarged wrist    Muscle Strength (0-5 scale):  Deltoid:  5  Biceps: 5/5   Triceps:  5  : 5/5   Iliopsoas: 5  Quadriceps:  5   Distal Lower Extremity: 5    Left Side Rheumatological Exam     Examination finds the 1st PIP, 1st MCP, 2nd PIP, 2nd MCP, 3rd PIP, 3rd MCP, 4th PIP, 4th MCP, 5th PIP and 5th MCP normal.    The patient has an enlarged wrist.    Muscle Strength (0-5 scale):  Deltoid:  5  Biceps: 5/5   Triceps:  5  :  5/5   Iliopsoas: 5  Quadriceps:  5   Distal Lower Extremity: 5        Lab Visit on 06/20/2022   Component Date Value Ref Range Status    Sed Rate 06/20/2022 1  0 - 20 mm/Hr Final    CRP 06/20/2022 <0.3  0.0 - 8.2 mg/L Final    WBC 06/20/2022 4.82  3.90 - 12.70 K/uL Final    RBC 06/20/2022 4.43  4.00 - 5.40 M/uL Final    Hemoglobin 06/20/2022 13.4  12.0 - 16.0 g/dL Final    Hematocrit 06/20/2022 41.5  37.0 - 48.5 % Final    MCV 06/20/2022 94  82 - 98 fL Final    MCH 06/20/2022 30.2  27.0 - 31.0 pg Final    MCHC 06/20/2022 32.3  32.0 - 36.0 g/dL Final    RDW 06/20/2022 13.4  11.5 - 14.5 % Final    Platelets 06/20/2022 256  150 - 450 K/uL Final    MPV 06/20/2022 11.1  9.2 - 12.9  fL Final    Immature Granulocytes 06/20/2022 0.2  0.0 - 0.5 % Final    Gran # (ANC) 06/20/2022 2.6  1.8 - 7.7 K/uL Final    Immature Grans (Abs) 06/20/2022 0.01  0.00 - 0.04 K/uL Final    Comment: Mild elevation in immature granulocytes is non specific and   can be seen in a variety of conditions including stress response,   acute inflammation, trauma and pregnancy. Correlation with other   laboratory and clinical findings is essential.      Lymph # 06/20/2022 1.2  1.0 - 4.8 K/uL Final    Mono # 06/20/2022 0.9  0.3 - 1.0 K/uL Final    Eos # 06/20/2022 0.0  0.0 - 0.5 K/uL Final    Baso # 06/20/2022 0.10  0.00 - 0.20 K/uL Final    nRBC 06/20/2022 0  0 /100 WBC Final    Gran % 06/20/2022 54.5  38.0 - 73.0 % Final    Lymph % 06/20/2022 24.1  18.0 - 48.0 % Final    Mono % 06/20/2022 18.3 (A) 4.0 - 15.0 % Final    Eosinophil % 06/20/2022 0.8  0.0 - 8.0 % Final    Basophil % 06/20/2022 2.1 (A) 0.0 - 1.9 % Final    Differential Method 06/20/2022 Automated   Final    Sodium 06/20/2022 134 (A) 136 - 145 mmol/L Final    Potassium 06/20/2022 3.6  3.5 - 5.1 mmol/L Final    Chloride 06/20/2022 102  95 - 110 mmol/L Final    CO2 06/20/2022 25  23 - 29 mmol/L Final    Glucose 06/20/2022 97  70 - 110 mg/dL Final    BUN 06/20/2022 15  7 - 17 mg/dL Final    Creatinine 06/20/2022 0.82  0.50 - 1.40 mg/dL Final    Calcium 06/20/2022 8.7  8.7 - 10.5 mg/dL Final    Total Protein 06/20/2022 6.1  6.0 - 8.4 g/dL Final    Albumin 06/20/2022 3.9  3.5 - 5.2 g/dL Final    Total Bilirubin 06/20/2022 0.7  0.1 - 1.0 mg/dL Final    Comment: For infants and newborns, interpretation of results should be based  on gestational age, weight and in agreement with clinical  observations.    Premature Infant recommended reference ranges:  Up to 24 hours.............<8.0 mg/dL  Up to 48 hours............<12.0 mg/dL  3-5 days..................<15.0 mg/dL  6-29 days.................<15.0 mg/dL      Alkaline Phosphatase 06/20/2022 74  38  - 126 U/L Final    AST 06/20/2022 34  15 - 46 U/L Final    ALT 06/20/2022 26  10 - 44 U/L Final    Anion Gap 06/20/2022 7 (A) 8 - 16 mmol/L Final    eGFR if African American 06/20/2022 >60.0  >60 mL/min/1.73 m^2 Final    eGFR if non African American 06/20/2022 >60.0  >60 mL/min/1.73 m^2 Final    Comment: Calculation used to obtain the estimated glomerular filtration  rate (eGFR) is the CKD-EPI equation.              Assessment/Plan   62 yo female with RA - well controlled on current regimen; secondary OA - for which she sees pain management; fibromyalgia - well controlled with lifestyle modifications and Lyrica; & osteoporosis - improved with 2 years of teriparatide and now on denosumab 60 mg sc q 6 months.    - 2nd Pfizer Covid Booster (4th shot); hold leflunomide for 1 week after injection  - Continue sarilumab 200 mg sc q 2 wks  - Continue leflunomide 20mg daily  - Continue prednisone 5 mg daily  - Continue denosumab 60 mg sc q 6 months, most recent DXA 3/8/22 23% and 1.6%  - Continue Lyrica 50 mg nightly   - Continue 1200 mg dietary calcium daily  - Continue yoga on [employer]  - Continue Aerobic exercises  - Referral to PT for knee, back, and shoulder pain  - Return to clinic in 3 months with standing labs, fasting lipid panel, vitamin D.     Naveed Rivera MD  [employer] [facility] PGY-1    Pt was seen and discussed with Dr. Moran who is in agreement with the plan.

## 2022-06-23 NOTE — PROGRESS NOTES
I have personally taken the history and examined the patient and agree with the resident,s note as stated above           erosive RA discontinued long term methotrexate b/o nausea with both po and sc and reduced dose, allergic to sulfa, and had reaction to hydroxychloroquine;  Has failed 3 TNFi(infliximab, etanercept, adalimumab), abatacept, hypogamma with rituximab and gets frequent infections in any event, acute vertigo/dizziness with tocilizumab x2  Failed tofacitinib  Currently on Sarilumab   Future option would be  changing sarilumab to  upadacitinib            RA  TJ 2  SJ   Pt global 90  ESR 1  CRP <0.3 DAS28 1.82(remission)   NAC09-OIR 2.87(LDA)   CDAI11 (MDA) but all driven by low back pain, neck pain, fibromyalgia, right shoulder pain, OA right knee  Rheumatoid Arthritis Treatment Goals:  -Disease Activity Measure:CDAI   -Target: LDA  -Patient Goal:  -Therapy/Change: none, activity scores driven by back symptoms, neck symptoms, fibromyalgia , right shoulder, OA right knee, not RA. No need to change sarilumab to upadacitinib  -Shared Decision Making: Discussed goals of care and therapy plan together with patient as outlined above.     Osteoporosis DXA 3/8/22: normal with FRAX : hip 1.6% major 23%  : completed 2 years of teriparatide added to denosumab, now denosumab alone  Cont denosumab 60mg sc q  6months(since 2017); teriparatide 1/2020 -1/2022  Saw Dr. Lane Endo 1/5/22: cont denosumab, could potentially resume teriparatide to switch to romosozumab if fractures on denosumab.  Fibromyalgia: intolerant of duloxetine, gabapentin. pregabalin 50mg nightly helps  Subclinical hyperthyroidism, thyroid nodule: Dr. Lane  Lumbar spondylosis, had RFA 4/4/22 and 4/18/22 Dr Mayfield  Hyperlipidemia LDL 82.8 7/26/21 on rosuvastatin 20mg nightly  /87  OA right knee  OA right ACJ    *4th dose(2nd booster) of Pfizer Covid vaccine yesterday hold leflunomide for 1-2 wks then resume  For foot/ankle surgery: schedule  week 3 since last sarilumab dose; resume sarilumab 2 wks post op. Continue leflunomide through surgery. Continue prednisone 5mg daily through surgery. Hold naproxen 1 wk pre-op  Try increasing pregabalin to 100mg nightly if tolerated, if not reduce back to 50mg nightly  sarilumab 200 mg sc q 2 wks  leflunomide 20mg daily  prednisone  5 mg daily  pregabalin 50mg nightly, sleeping better spirits better   yoga on [employer]  Aerobic exercises stationary bike  F/u Pain Management for neck and back  Continue denosumab 60 mg sc q 6 months  1200 mg dietary calcium daily  Ref to PT for right shoulder and right knee   RTC 3 months with standing labs, lipid panel, vitamin D

## 2022-06-24 ENCOUNTER — PATIENT MESSAGE (OUTPATIENT)
Dept: ORTHOPEDICS | Facility: CLINIC | Age: 62
End: 2022-06-24
Payer: MEDICARE

## 2022-06-24 ENCOUNTER — TELEPHONE (OUTPATIENT)
Dept: PAIN MEDICINE | Facility: CLINIC | Age: 62
End: 2022-06-24
Payer: MEDICARE

## 2022-06-24 ENCOUNTER — TELEPHONE (OUTPATIENT)
Dept: PAIN MEDICINE | Facility: CLINIC | Age: 62
End: 2022-06-24

## 2022-06-24 ENCOUNTER — OFFICE VISIT (OUTPATIENT)
Dept: PAIN MEDICINE | Facility: CLINIC | Age: 62
End: 2022-06-24
Payer: MEDICARE

## 2022-06-24 VITALS
HEART RATE: 83 BPM | BODY MASS INDEX: 29.64 KG/M2 | TEMPERATURE: 98 F | WEIGHT: 157 LBS | RESPIRATION RATE: 18 BRPM | HEIGHT: 61 IN | SYSTOLIC BLOOD PRESSURE: 154 MMHG | DIASTOLIC BLOOD PRESSURE: 84 MMHG

## 2022-06-24 DIAGNOSIS — M25.559 PAIN IN UNSPECIFIED HIP: ICD-10-CM

## 2022-06-24 DIAGNOSIS — M25.561 ACUTE PAIN OF RIGHT KNEE: ICD-10-CM
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
## 2022-06-24 DIAGNOSIS — M25.552 CHRONIC HIP PAIN, LEFT: ICD-10-CM

## 2022-06-24 DIAGNOSIS — M79.18 MYOFASCIAL PAIN: Primary | ICD-10-CM
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
## 2022-06-24 DIAGNOSIS — G89.29 CHRONIC HIP PAIN, LEFT: ICD-10-CM

## 2022-06-24 LAB — BACTERIA UR CULT: NO GROWTH

## 2022-06-24 PROCEDURE — 1160F RVW MEDS BY RX/DR IN RCRD: CPT | Mod: CPTII,S$GLB,, | Performed by: NURSE PRACTITIONER

## 2022-06-24 PROCEDURE — 1160F PR REVIEW ALL MEDS BY PRESCRIBER/CLIN PHARMACIST DOCUMENTED: ICD-10-PCS | Mod: CPTII,S$GLB,, | Performed by: NURSE PRACTITIONER

## 2022-06-24 PROCEDURE — 3079F DIAST BP 80-89 MM HG: CPT | Mod: CPTII,S$GLB,, | Performed by: NURSE PRACTITIONER

## 2022-06-24 PROCEDURE — 99999 PR PBB SHADOW E&M-EST. PATIENT-LVL V: CPT | Mod: PBBFAC,,, | Performed by: NURSE PRACTITIONER

## 2022-06-24 PROCEDURE — 3077F SYST BP >= 140 MM HG: CPT | Mod: CPTII,S$GLB,, | Performed by: NURSE PRACTITIONER

## 2022-06-24 PROCEDURE — 1159F MED LIST DOCD IN RCRD: CPT | Mod: CPTII,S$GLB,, | Performed by: NURSE PRACTITIONER

## 2022-06-24 PROCEDURE — 96372 THER/PROPH/DIAG INJ SC/IM: CPT | Mod: S$GLB,,, | Performed by: NURSE PRACTITIONER

## 2022-06-24 PROCEDURE — 99214 PR OFFICE/OUTPT VISIT, EST, LEVL IV, 30-39 MIN: ICD-10-PCS | Mod: 25,S$GLB,, | Performed by: NURSE PRACTITIONER

## 2022-06-24 PROCEDURE — 96372 PR INJECTION,THERAP/PROPH/DIAG2ST, IM OR SUBCUT: ICD-10-PCS | Mod: S$GLB,,, | Performed by: NURSE PRACTITIONER

## 2022-06-24 PROCEDURE — 3044F HG A1C LEVEL LT 7.0%: CPT | Mod: CPTII,S$GLB,, | Performed by: NURSE PRACTITIONER

## 2022-06-24 PROCEDURE — 99214 OFFICE O/P EST MOD 30 MIN: CPT | Mod: 25,S$GLB,, | Performed by: NURSE PRACTITIONER

## 2022-06-24 PROCEDURE — 3079F PR MOST RECENT DIASTOLIC BLOOD PRESSURE 80-89 MM HG: ICD-10-PCS | Mod: CPTII,S$GLB,, | Performed by: NURSE PRACTITIONER

## 2022-06-24 PROCEDURE — 1159F PR MEDICATION LIST DOCUMENTED IN MEDICAL RECORD: ICD-10-PCS | Mod: CPTII,S$GLB,, | Performed by: NURSE PRACTITIONER

## 2022-06-24 PROCEDURE — 99999 PR PBB SHADOW E&M-EST. PATIENT-LVL V: ICD-10-PCS | Mod: PBBFAC,,, | Performed by: NURSE PRACTITIONER

## 2022-06-24 PROCEDURE — 3008F BODY MASS INDEX DOCD: CPT | Mod: CPTII,S$GLB,, | Performed by: NURSE PRACTITIONER

## 2022-06-24 PROCEDURE — 3008F PR BODY MASS INDEX (BMI) DOCUMENTED: ICD-10-PCS | Mod: CPTII,S$GLB,, | Performed by: NURSE PRACTITIONER

## 2022-06-24 PROCEDURE — 3077F PR MOST RECENT SYSTOLIC BLOOD PRESSURE >= 140 MM HG: ICD-10-PCS | Mod: CPTII,S$GLB,, | Performed by: NURSE PRACTITIONER

## 2022-06-24 PROCEDURE — 3044F PR MOST RECENT HEMOGLOBIN A1C LEVEL <7.0%: ICD-10-PCS | Mod: CPTII,S$GLB,, | Performed by: NURSE PRACTITIONER

## 2022-06-24 RX ORDER — KETOROLAC TROMETHAMINE 30 MG/ML
30 INJECTION, SOLUTION INTRAMUSCULAR; INTRAVENOUS ONCE
Status: COMPLETED | OUTPATIENT
Start: 2022-06-24 | End: 2022-06-24

## 2022-06-24 RX ADMIN — KETOROLAC TROMETHAMINE 30 MG: 30 INJECTION, SOLUTION INTRAMUSCULAR; INTRAVENOUS at 11:06

## 2022-06-24 NOTE — PROGRESS NOTES
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 Bilateral;  2 of 2    INJECTION OF FACET JOINT Bilateral 3/12/2020    Procedure: FACET JOINT INJECTION (LUMBAR BLOCK) BILATERAL L4-5 AND L5-S1 DIRECT REFERRAL;  Surgeon: Tj Mayfield MD;  Location: [facility];  Service: Pain Management;  Laterality: Bilateral;  NEEDS CONSENT    INJECTION OF FACET JOINT Bilateral 3/29/2021    Procedure: INJECTION, FACET JOINT L3/4, L4/5, L5/S1;  Surgeon: Tj Mayfield MD;  Location: [facility];  Service: Pain Management;  Laterality: Bilateral;    INJECTION OF JOINT Right 7/30/2020    Procedure: INJECTION, JOINT, RIGHT HIP Interarticular under flouro;  Surgeon: Tj Mayfield MD;  Location: [facility];  Service: Pain Management;  Laterality: Right;  INJECTION, JOINT, RIGHT HIP Interarticular under flouro    INJECTION OF JOINT Right 2/21/2022    Procedure: Injection, Joint RIGHT ISCHIAL BURSA;  Surgeon: Tj Mayfield MD;  Location: [facility];  Service: Pain Management;  Laterality: Right;    INTRAMEDULLARY RODDING OF FEMUR Left 5/21/2019    Procedure: INSERTION, INTRAMEDULLARY ARIEL, FEMUR;  Surgeon: López Duff MD;  [facility];  Service: Orthopedics;  Laterality: Left;    RADIOFREQUENCY ABLATION Left 9/20/2021    Procedure: RADIOFREQUENCY ABLATION left L3,4,5 RFA  1 of 2  CONSENT NEEDED;  Surgeon: Tj Mayfield MD;  Location: [facility];  Service: Pain  Management;  Laterality: Left;    RADIOFREQUENCY ABLATION Right 10/4/2021    Procedure: RADIOFREQUENCY ABLATION  right L3,4,5 RFA   2 of 2  CONSENT NEEDED;  Surgeon: Tj Mayfield MD;  Location: [facility];  Service: Pain Management;  Laterality: Right;    RADIOFREQUENCY ABLATION Left 4/21/2022    Procedure: Radiofrequency Ablation LEFT L3,L4,L5;  Surgeon: Tj Mayfield MD;  Location: [facility];  Service: Pain Management;  Laterality: Left;    RADIOFREQUENCY ABLATION Right 5/12/2022    Procedure: Radiofrequency Ablation RIGHT L3,L4,L5;  Surgeon: Tj Mayfield MD;  Location: [facility];  Service: Pain Management;  Laterality: Right;    REPAIR OF TRICEPS TENDON Left 10/17/2018    Procedure: REPAIR, TENDON, TRICEPS left elbow;  Surgeon: Staci Yarbrough MD;  Location: [facility] OR [address] [place] [address];  Service: Orthopedics;  Laterality: Left;  Anesthesia: General and regional. PRONE, k-wire , hand pan 1 and pan 2, CALL ARTHREX/Tamera notified 10-12 LO    TONSILLECTOMY      TRANSFORAMINAL EPIDURAL INJECTION OF STEROID Right 8/31/2020    Procedure: INJECTION, STEROID, EPIDURAL, TRANSFORAMINAL,  APPROACH, L3-L4 and L4-L5 need consent;  Surgeon: Tj Mayfield MD;  Location: [facility];  Service: Pain Management;  Laterality: Right;    TRANSFORAMINAL EPIDURAL INJECTION OF STEROID Bilateral 7/23/2021    Procedure: INJECTION, STEROID, EPIDURAL, TRANSFORAMINAL APPROACH L4/5;  Surgeon: Tj Mayfield MD;  Location: [facility];  Service: Pain Management;  Laterality: Bilateral;    TRIGGER POINT INJECTION N/A 7/23/2021    Procedure: INJECTION, TRIGGER POINT SCAPULAR;  Surgeon: Tj Mayfield MD;  Location: [facility];  Service: Pain Management;  Laterality: N/A;    TUBAL LIGATION  2003    UPPER GASTROINTESTINAL ENDOSCOPY      10/11    uterine ablation  2003       Family History:  Family History   Problem Relation Age of Onset    Cancer Mother         Lung Cancer    Emphysema Mother     Heart attack Mother      Alcohol abuse Mother     Cancer Father         Lung Cancer    Osteoarthritis Father     Lung cancer Father     Skin cancer Father     Alcohol abuse Father     Heart disease Brother     Heart attack Brother     Alcohol abuse Brother     Cirrhosis Brother     Osteoarthritis Paternal Aunt     Retinal detachment Maternal Aunt     No Known Problems Sister     No Known Problems Maternal Uncle     No Known Problems Paternal Uncle     No Known Problems Maternal Grandmother     No Known Problems Maternal Grandfather     No Known Problems Paternal Grandmother     No Known Problems Paternal Grandfather     Colon cancer Neg Hx     Esophageal cancer Neg Hx     Stomach cancer Neg Hx     Celiac disease Neg Hx     Diabetes Neg Hx     Thyroid disease Neg Hx     Amblyopia Neg Hx     Blindness Neg Hx     Cataracts Neg Hx     Glaucoma Neg Hx     Hypertension Neg Hx     Macular degeneration Neg Hx     Strabismus Neg Hx     Stroke Neg Hx        Social History:  Social History     Socioeconomic History    Marital status:    Tobacco Use    Smoking status: Never Smoker    Smokeless tobacco: Never Used   Substance and Sexual Activity    Alcohol use: Yes     Comment: 2-3 times per year.    Drug use: No    Sexual activity: Yes     Partners: Male     Birth control/protection: Surgical     Social Determinants of Health     Financial Resource Strain: Low Risk     Difficulty of Paying Living Expenses: Not hard at all   Food Insecurity: No Food Insecurity    Worried About Running Out of Food in the Last Year: Never true    Ran Out of Food in the Last Year: Never true   Transportation Needs: No Transportation Needs    Lack of Transportation (Medical): No    Lack of Transportation (Non-Medical): No   Physical Activity: Unknown    Days of Exercise per Week: 2 days   Stress: No Stress Concern Present    Feeling of Stress : Not at all   Social Connections: Unknown    Frequency of Communication with  "Friends and Family: More than three times a week    Frequency of Social Gatherings with Friends and Family: Twice a week    Active Member of Clubs or Organizations: Yes    Attends Club or Organization Meetings: 1 to 4 times per year    Marital Status:    Housing Stability: Low Risk     Unable to Pay for Housing in the Last Year: No    Number of Places Lived in the Last Year: 1    Unstable Housing in the Last Year: No       OBJECTIVE:    BP (!) 154/84 (BP Location: Left arm, Patient Position: Sitting, BP Method: Medium (Automatic))   Pulse 83   Temp 97.7 °F (36.5 °C) (Temporal)   Resp 18   Ht 5' 1" (1.549 m)   Wt 71.2 kg (157 lb)   LMP  (LMP Unknown)   BMI 29.66 kg/m²     PHYSICAL EXAMINATION:    General appearance: Well appearing, in no acute distress, alert and oriented x3.  Psych:  Mood and affect appropriate.  Back: TTP over lumbar facet joints bilaterally. Limited ROM with pain on lumbar extension and flexion.  Positive facet loading bilaterally L>R. Pain on extension and flexion.  Negative SLR bilaterally.   MSK: TTP to right ischial bursa which reproduces radicular symptoms. TTP to lateral right knee. Pain on extension of right knee. TTP at left lateral and anterior hip. TTP over left GTB. Pain with manipulation of left hip.  Neuro: No loss of sensation.  Kelsey is negative bilaterally.  Gait: Antalgic- ambulates with wheelchair.    ASSESSMENT: 61 y.o. year old female with right sided back and hip pain, consistent with the following diagnoses:     1. Myofascial pain  ketorolac injection 30 mg   2. Pain in unspecified hip  CT Hip Without Contrast Left   3. Fall at home, sequela     4. Acute pain of right knee     5. Chronic hip pain, left           PLAN:     - Previous imaging was reviewed and discussed with the patient today.    - Will give 30 mg IM Toradol today for pain.     - Can continue medications.    - Obtain left hip CT to evaluate for fracture.    - She is scheduled for right " ischial bursa injection on 6/30/22. Her primary pain is left hip. Will re-evaluate injection when I receive CT results. Hold off on right knee MRI for now.    - RTC 2 weeks after procedure.    - Counseled patient regarding the importance of constant sleeping habits and physical therapy.        The above plan and management options were discussed at length with patient. Patient is in agreement with the above and verbalized understanding.    Valarie Law  06/24/2022    I spent a total of 30 minutes on the day of the visit.  This includes face to face time and non-face to face time preparing to see the patient by reviewing previous labs/imaging, obtaining and/or reviewing separately obtained history, documenting clinical information in the electronic or other health record, independently interpreting results and communicating results to the patient/family/caregiver.

## 2022-06-24 NOTE — TELEPHONE ENCOUNTER
Staff spoke with patient in regards to wanting to know is she needs x rays before her visit today. Staff informed patient that Valarie doesn't want her to have x rays before appt she will evaluate her when she arrives today and decide if imaging is needed.

## 2022-06-24 NOTE — TELEPHONE ENCOUNTER
----- Message from Mia Nolen sent at 6/24/2022  8:29 AM CDT -----  Who called?:PT      What is the request in detail:PT would like to know if she can have xrays done before her appointment. Please advise         Can the clinic reply by [facility]?:no        Best Call Back Number: [phone]

## 2022-06-27 ENCOUNTER — TELEPHONE (OUTPATIENT)
Dept: RHEUMATOLOGY | Facility: CLINIC | Age: 62
End: 2022-06-27
Payer: MEDICARE

## 2022-06-27 NOTE — TELEPHONE ENCOUNTER
Joyce Jordan just messaged me. Has new fracture left hip on denosumab alone. You had mentioned either extending teriparatide duration or a course of  Romosozumab. What do you think?  Thank you RJQ      EXAMINATION:  CT HIP WITHOUT CONTRAST LEFT     CLINICAL HISTORY:  Hip pain, stress fracture suspected, neg xray;query fracture;  Pain in unspecified hip     TECHNIQUE:  CT of the left hip was performed without intravenous contrast.     COMPARISON:  Pelvic and hip radiographs May 23, 2022; left hip CT November 29, 2021     FINDINGS:  Proximal left femur orthopedic fixation hardware again seen which transfixes both the left femoral neck and the visualized portion of the femoral shaft.  There is a nondisplaced perihardware fracture again seen within the proximal left femur.  This fracture line begins at the junction of the intertrochanteric femur and the proximal femoral shaft demonstrating a transversely oriented component at this site (series 200, images 64 through 79).  The fracture then extends inferiorly along the anterior left femoral shaft cortex for a length of approximately 6 cm (series 2, image 188-257).  The component of this fracture which extends along the anterior cortex is new compared to prior CT from November 2021 with associated periosteal reaction along its length.     Unchanged foci of heterotopic calcification are seen adjacent to the left femur greater trochanter.  Left hip joint space is preserved.  No fracture identified within the visualized portion of the pelvis.  No acute abnormality identified within the musculature about the left hip.  Mild fatty atrophy is seen involving the gluteus medius and minimus muscles.     Visualized pelvic viscera are unremarkable.  No left inguinal lymphadenopathy.     Impression:     Nondisplaced perihardware fracture of the proximal left femur.  Fracture begins at the junction of the intertrochanteric femur and the proximal femoral diaphysis with a new vertical  component extending inferiorly along the anterior femoral shaft when compared with CT from November 2021.        Electronically signed by: Delmar Mayberry  Date:                                            06/24/2022  Time:                                           13:08

## 2022-06-28 ENCOUNTER — TELEPHONE (OUTPATIENT)
Dept: RHEUMATOLOGY | Facility: CLINIC | Age: 62
End: 2022-06-28
Payer: MEDICARE

## 2022-06-28 ENCOUNTER — HOSPITAL ENCOUNTER (OUTPATIENT)
Dept: RADIOLOGY | Facility: HOSPITAL | Age: 62
Discharge: HOME | End: 2022-06-28
Attending: ORTHOPAEDIC SURGERY
Payer: MEDICARE

## 2022-06-28 ENCOUNTER — OFFICE VISIT (OUTPATIENT)
Dept: ORTHOPEDICS | Facility: CLINIC | Age: 62
End: 2022-06-28
Payer: MEDICARE

## 2022-06-28 DIAGNOSIS — R52 PAIN: ICD-10-CM

## 2022-06-28 DIAGNOSIS — S72.22XD CLOSED DISPLACED SUBTROCHANTERIC FRACTURE OF LEFT FEMUR WITH ROUTINE HEALING, SUBSEQUENT ENCOUNTER: Primary | ICD-10-CM

## 2022-06-28 DIAGNOSIS — S76.012A STRAIN OF FLEXOR MUSCLE OF LEFT HIP, INITIAL ENCOUNTER: ICD-10-CM

## 2022-06-28 DIAGNOSIS — M84.375A STRESS FRACTURE OF LEFT FOOT, INITIAL ENCOUNTER: Primary | ICD-10-CM

## 2022-06-28 DIAGNOSIS — G57.02 PIRIFORMIS SYNDROME, LEFT: ICD-10-CM

## 2022-06-28 DIAGNOSIS — M21.42 PES PLANOVALGUS, ACQUIRED, LEFT: ICD-10-CM

## 2022-06-28 DIAGNOSIS — M19.079 ARTHRITIS OF FOOT: ICD-10-CM

## 2022-06-28 DIAGNOSIS — M19.072 DEGENERATIVE JOINT DISEASE OF HINDFOOT, LEFT: ICD-10-CM

## 2022-06-28 DIAGNOSIS — S72.145D CLOSED NONDISPLACED INTERTROCHANTERIC FRACTURE OF LEFT FEMUR WITH ROUTINE HEALING, SUBSEQUENT ENCOUNTER: Primary | ICD-10-CM

## 2022-06-28 PROCEDURE — 1159F MED LIST DOCD IN RCRD: CPT | Mod: CPTII,S$GLB,, | Performed by: ORTHOPAEDIC SURGERY

## 2022-06-28 PROCEDURE — 99213 PR OFFICE/OUTPT VISIT, EST, LEVL III, 20-29 MIN: ICD-10-PCS | Mod: S$GLB,,, | Performed by: ORTHOPAEDIC SURGERY

## 2022-06-28 PROCEDURE — 99214 OFFICE O/P EST MOD 30 MIN: CPT | Mod: S$GLB,,, | Performed by: ORTHOPAEDIC SURGERY

## 2022-06-28 PROCEDURE — 1159F PR MEDICATION LIST DOCUMENTED IN MEDICAL RECORD: ICD-10-PCS | Mod: CPTII,S$GLB,, | Performed by: ORTHOPAEDIC SURGERY

## 2022-06-28 PROCEDURE — 99999 PR PBB SHADOW E&M-EST. PATIENT-LVL I: CPT | Mod: PBBFAC,,, | Performed by: ORTHOPAEDIC SURGERY

## 2022-06-28 PROCEDURE — 73630 X-RAY EXAM OF FOOT: CPT | Mod: TC,PO,LT

## 2022-06-28 PROCEDURE — 73630 XR FOOT COMPLETE 3 VIEW LEFT: ICD-10-PCS | Mod: 26,LT,, | Performed by: RADIOLOGY

## 2022-06-28 PROCEDURE — 3044F HG A1C LEVEL LT 7.0%: CPT | Mod: CPTII,S$GLB,, | Performed by: ORTHOPAEDIC SURGERY

## 2022-06-28 PROCEDURE — 3044F PR MOST RECENT HEMOGLOBIN A1C LEVEL <7.0%: ICD-10-PCS | Mod: CPTII,S$GLB,, | Performed by: ORTHOPAEDIC SURGERY

## 2022-06-28 PROCEDURE — 99213 OFFICE O/P EST LOW 20 MIN: CPT | Mod: S$GLB,,, | Performed by: ORTHOPAEDIC SURGERY

## 2022-06-28 PROCEDURE — 73630 X-RAY EXAM OF FOOT: CPT | Mod: 26,LT,, | Performed by: RADIOLOGY

## 2022-06-28 PROCEDURE — 99214 PR OFFICE/OUTPT VISIT, EST, LEVL IV, 30-39 MIN: ICD-10-PCS | Mod: S$GLB,,, | Performed by: ORTHOPAEDIC SURGERY

## 2022-06-28 PROCEDURE — 1160F RVW MEDS BY RX/DR IN RCRD: CPT | Mod: CPTII,S$GLB,, | Performed by: ORTHOPAEDIC SURGERY

## 2022-06-28 PROCEDURE — 99999 PR PBB SHADOW E&M-EST. PATIENT-LVL I: ICD-10-PCS | Mod: PBBFAC,,, | Performed by: ORTHOPAEDIC SURGERY

## 2022-06-28 PROCEDURE — 1160F PR REVIEW ALL MEDS BY PRESCRIBER/CLIN PHARMACIST DOCUMENTED: ICD-10-PCS | Mod: CPTII,S$GLB,, | Performed by: ORTHOPAEDIC SURGERY

## 2022-06-28 RX ORDER — METHOCARBAMOL 500 MG/1
1000 TABLET, FILM COATED ORAL 3 TIMES DAILY PRN
Qty: 180 TABLET | Refills: 0 | Status: SHIPPED | OUTPATIENT
Start: 2022-06-28 | End: 2022-07-19

## 2022-06-28 RX ORDER — METHOCARBAMOL 750 MG/1
750 TABLET, FILM COATED ORAL 3 TIMES DAILY PRN
Qty: 40 TABLET | Refills: 1 | Status: SHIPPED | OUTPATIENT
Start: 2022-06-28 | End: 2022-06-28 | Stop reason: SDUPTHER

## 2022-06-28 NOTE — PROGRESS NOTES
HPI: 61-year-old female status post cephalomedullary nail fixation of nondisplaced left intertrochanteric femur fracture by me on 05/21/2019.  The patient has had several falls recently and has an upcoming surgery with the foot and ankle surgeons for foot issues.  Her last fall on 05/03/2022 brought her to the emergency department.  At that point they obtained x-rays of the left hip.  She is now following up with me today.  She has had pain around the area the left hip, most notably posteriorly in the buttocks and laterally.  This improved over time.    06/28/2022:  At last visit when I saw her on 05/22/2022 she had a visible nondisplaced fracture line in the subtrochanteric region of the left femur around her old nail.  I assured her that point time that the nail would have been the treatment for that fracture in the 1st place.  She has since had a CT scan ordered by another physician fully that area.  This confirms her fracture.  She has had some significant pain last 4 days in that area and the posterior buttock as before but now has pain in the anterior portion and around her hip flexors..  No new trauma.    Past Medical History:   Diagnosis Date    Acid reflux     Allergy     Anemia     Asthma     Coronary artery disease     CS (cervical spondylosis) 3/8/2013    Degenerative disc disease     Dry eyes     Dry mouth     Gastroparesis     History of methotrexate therapy 1/19/2022    Hyperlipidemia     Lateral meniscus derangement 4/6/2016    Lobular carcinoma in situ     Lumbar spondylosis 3/8/2013    Osteoarthritis     Osteoporosis     Rheumatoid arthritis(714.0)     Rupture of left triceps tendon 10/17/2018    Umbilical hernia 8/13/2015       Current Outpatient Medications on File Prior to Visit   Medication Sig Dispense Refill    albuterol (PROVENTIL/VENTOLIN HFA) 90 mcg/actuation inhaler INHALE 2 PUFFS INTO THE LUNGS EVERY 6 (SIX) HOURS AS NEEDED. RESCUE 20.1 g 11    albuterol-ipratropium  (DUO-NEB) 2.5 mg-0.5 mg/3 mL nebulizer solution Take 3 mLs by nebulization every 6 (six) hours as needed for Wheezing. Rescue 90 mL 3    aspirin 81 mg Tab Take 81 mg by mouth every morning.       budesonide-formoterol 160-4.5 mcg (SYMBICORT) 160-4.5 mcg/actuation HFAA INHALE 2 PUFFS INTO THE LUNGS EVERY 12 (TWELVE) HOURS. 30.6 g 3    calcium citrate-vitamin D3 315-200 mg (CITRACAL+D) 315 mg-5 mcg (200 unit) per tablet Take 1 tablet by mouth 2 (two) times daily.       cycloSPORINE (RESTASIS) 0.05 % ophthalmic emulsion Instill one drop into both eyes two times per day 60 each 10    diclofenac sodium (VOLTAREN) 1 % Gel APPLY 2 GRAMS TOPICALLY 4 (FOUR) TIMES DAILY AS NEEDED. 300 g 2    erythromycin (ROMYCIN) ophthalmic ointment Place into the left eye 2 (two) times daily. Apply to affected external eye tissue 3.5 g 0    estrogens, conjugated, (PREMARIN) 0.625 MG tablet take 1 tablet by mouth daily 90 tablet 4    fluconazole (DIFLUCAN) 100 MG tablet Take 1 tablet by mouth once daily for 14 days. 14 tablet 0    halobetasol propion-tazarotene (DUOBRII) 0.01-0.045 % Lotn aaa on feet and hands qhs  then vaseline and warm towel 100 g 3    INV PROPULSID 10 MG Take 2 tablets (20 mg) by mouth 4 (four) times daily. FOR INVESTIGATIONAL USE ONLY. Protocol [id number]  Subject ID: [id number]. 1440 each 0    ketoconazole (NIZORAL) 2 % cream Apply to feet twice daily for 3 weeks (Patient taking differently: Apply to feet twice daily for 3 weeks) 60 g 3    leflunomide (ARAVA) 20 MG Tab TAKE 1 TABLET BY MOUTH EVERY DAY 90 tablet 0    lifitegrast (XIIDRA) 5 % Dpet INSTILL ONE DROP INTO BOTH EYES TWICE A DAY (Patient taking differently: INSTILL ONE DROP INTO BOTH EYES TWICE A DAY) 60 mL 10    montelukast (SINGULAIR) 10 mg tablet TAKE 1 TABLET BY MOUTH EVERY DAY AT NIGHT 90 tablet 3    omeprazole (PRILOSEC) 40 MG capsule Take 1 capsule (40 mg total) by mouth every morning. 30 capsule 6    omeprazole-sodium bicarbonate (ZEGERID)  40-1.1 mg-gram per capsule TAKE 1 CAPSULE BY MOUTH EVERY DAY IN THE MORNING 90 capsule 3    POTASSIUM ORAL Take by mouth once daily.      predniSONE (DELTASONE) 5 MG tablet TAKE 1 TABLET BY MOUTH EVERY DAY 90 tablet 1    pregabalin (LYRICA) 50 MG capsule Take 1 capsule (50 mg total) by mouth every evening. 90 capsule 1    PREMARIN vaginal cream PLACE 0.5 GRAM VAGINALLY 3 (THREE) TIMES A WEEK. 30 g 1    progesterone (PROMETRIUM) 100 MG capsule Take 1 capsule by mouth daily. 90 capsule 1    progesterone (PROMETRIUM) 100 MG capsule take 1 capsule by mouth daily 90 capsule 4    promethazine (PHENERGAN) 25 MG tablet Take 1 tablet (25 mg total) by mouth every 6 (six) hours as needed for Nausea. 30 tablet 1    rosuvastatin (CRESTOR) 20 MG tablet Take 1 tablet (20 mg total) by mouth every evening. 90 tablet 3    sarilumab (KEVZARA) 200 mg/1.14 mL Syrg Inject 1.14 mL (200 mg total) into the skin every 14 (fourteen) days. 2.28 mL 2    sucralfate (CARAFATE) 1 gram tablet Take 1 tablet (1 g total) by mouth 4 (four) times daily. 360 tablet 3    traMADoL (ULTRAM) 50 mg tablet TAKE 1 TABLET (50 MG TOTAL) BY MOUTH EVERY 12 (TWELVE) HOURS AS NEEDED FOR PAIN. 60 tablet 0    [DISCONTINUED] naproxen (NAPROSYN) 500 MG tablet Take 1 tablet (500 mg total) by mouth 2 (two) times daily as needed. 180 tablet 0    mirabegron (MYRBETRIQ) 25 mg Tb24 ER tablet Take 1 tablet (25 mg total) by mouth once daily. 30 tablet 11     No current facility-administered medications on file prior to visit.         ROS:  Patient denies constitutional symptoms, cardiac symptoms, respiratory symptoms, GI symptoms.  The remainder of the musculoskeletal ROS is included in the HPI.    PE:    AA&O x 4.  NAD  HEENT:  NCAT, sclera nonicteric  Lungs:  Respirations are equal and unlabored.  CV:  2+ bilateral upper and lower extremity pulses.  Skin:  Intact throughout.    MS:  Exam of the left lower extremity shows good range of motion of the hip.  No pain  with log roll.  Some tenderness to palpation laterally and posterior in the buttocks.  No groin tenderness to palpation.  Neurovascular intact distally.  She is having some difficulty with fully extending the left hip but no issue with flexion and internal external rotation.    Rads:  X-rays of the left femur performed today were reviewed and interpreted by me personally.  There is a small cortical defect that looks like a fracture line posteriorly with an intact nail.  Looking back at her films from 05/03 I think I can see the same line.  Today this is beginning to show some early changes of callus.    A/P:  61-year-old female with a likely nondisplaced the intertrochanteric/subtrochanteric partial fracture with a cephalomedullary nail already in place from 2019. I explained to her that this is not a concern with the nail artery and placed in that she continue weight-bearing as tolerated.  She has a new pain in the anterior portion of her hip in the last 4 days.  She does have some difficulty with fully extending that side.  She has been ambulating slightly hunched on her walker.  I encouraged her to do extension exercises of the left hip.  I am going to send her for formal physical therapy to work on gait training and stretching of the hip flexors.  She may continue weight-bearing as tolerated.  I will see her back in 6-8 weeks time with x-rays of the left femur.  She does have some remaining tramadol and has taken hydrocodone.  I advised her that taking something more mild like methocarbamol would be better for her during this time.

## 2022-06-28 NOTE — PROGRESS NOTES
Subjective:   Chief complaint: Follow-up left foot.    HPI:   Joyce Peterson is a 61 y.o. female who presents today for follow-up. PAIN IS 4/10, pt is here to discuss sx.  Pain radiates up leg but is also localized to the hindfoot.  There is associated numbness.  Pain bothers her more than numbness.    PMH RA and osteoporosis.  On forteo + prolia for bones (cephalomedullary nail fixation of nondisplaced left intertrochanteric femur fracture by Dr. Duff on 05/21/2019).  One pred + arava + naproxen + sarilumab per Dr. Moran.     Also with well controlled fibromyalgia and backpain (gets MADELINE), not on narcotics other than occasional tramadol.    ROS:  Musculoskeletal: per HPI    Objective:   Exam:  There were no vitals filed for this visit.  General: No acute distress, well-appearing  Musculoskeletal: Standing examination demonstrates severe valgus.  There is  swelling.  There is no ecchymosis.  Medial longitudinal arch is pes planus and and asymmetric .      Focused exam of the left lower extremity demonstrates non-irritable ankle range of motion.  Hindfoot is fixed/rigid.  Ankle dorsiflexion is decreased with negative Silfverskiold  and but unable to reduce hindfoot.  TTP over sinus tarsi and TNJ    Fires TA/GSC/PTT/peroneals.  SILT SP/DP/PT and able to localize but with paresthesias.      Imaging:  Radiographs were ordered and independently interpreted by me.    Standing xray foot 3v left demonstrates severe collapse with OA changes through midfoot and hindfoot.    Additional testing/results reviewed:  Previous treatment- Recommended Co-surgery with Dr. Isiah Steele: TN/STF, gastroc lengthening, calc bone grafting surgery. Would need following preop - vitamin D level, PCP clearance, RA clearance (with med rec).     Surgery would be overnight stay minimum.  Patient thinks she may need rehab afterward due to limitations on her RLE bearing full weight during NWB period.      Assessment:     1. Stress fracture  of left foot, initial encounter    2. Arthritis of foot    3. Pes planovalgus, acquired, left    4. Degenerative joint disease of hindfoot, left        Patient is seen for a chronic problem (not at treatment goal) with treatment complicated by osteoporosis, rheumatoid OA and chronic pain.    Data: 1 results independently interpreted    Treatment plan: Decision making for major surgery with procedural risk factors    I again reviewed imaging, clinical history, and diagnosis as above with the patient. I attempted to use layman's terms to educate the patient as well as utilize foot models and/or pictures.   At this point, attempted non-operative treatment is not providing relief.  The next step is consideration of surgery.    In her case surgery would involve ARLEN, CBG, triple and midfoot arthrodesis (1-2nd NC-Metatarsal).  Last time, we spoke about possibly needing postop rehab but they are currently in the process of renovating their house to make it more accessible.     Plan:       See AVS    No orders of the defined types were placed in this encounter.      Past Medical History:   Diagnosis Date    Acid reflux     Allergy     Anemia     Asthma     Coronary artery disease     CS (cervical spondylosis) 3/8/2013    Degenerative disc disease     Dry eyes     Dry mouth     Gastroparesis     History of methotrexate therapy 1/19/2022    Hyperlipidemia     Lateral meniscus derangement 4/6/2016    Lobular carcinoma in situ     Lumbar spondylosis 3/8/2013    Osteoarthritis     Osteoporosis     Rheumatoid arthritis(714.0)     Rupture of left triceps tendon 10/17/2018    Umbilical hernia 8/13/2015       Past Surgical History:   Procedure Laterality Date    BREAST BIOPSY Left 01/29/2002    core bx    CARPAL TUNNEL RELEASE Right 05/2017    CHOLECYSTECTOMY  2004    COLONOSCOPY      10/11    COLONOSCOPY N/A 6/29/2017    Procedure: COLONOSCOPY;  Surgeon: López Moore MD;  Location[facility] (4TH FLR);   Service: Endoscopy;  Laterality: N/A;    EPIDURAL STEROID INJECTION N/A 11/18/2021    Procedure: INJECTION, STEROID, EPIDURAL, L5-S1 IL NEED CONSENT;  Surgeon: Tj Mayfield MD;  Location: [facility];  Service: Pain Management;  Laterality: N/A;    INJECTION OF ANESTHETIC AGENT AROUND NERVE Bilateral 8/23/2021    Procedure: BLOCK, NERVE, MEDIAL BRANCH L3,L4,L5;  Surgeon: Tj Mayfield MD;  Location: [facility];  Service: Pain Management;  Laterality: Bilateral;  1 of 2    INJECTION OF ANESTHETIC AGENT AROUND NERVE Bilateral 8/26/2021    Procedure: BLOCK, NERVE, MEDIAL BRANCH L3,L4,L5;  Surgeon: Tj Mayfield MD;  Location: [facility];  Service: Pain Management;  Laterality: Bilateral;  2 of 2    INJECTION OF FACET JOINT Bilateral 3/12/2020    Procedure: FACET JOINT INJECTION (LUMBAR BLOCK) BILATERAL L4-5 AND L5-S1 DIRECT REFERRAL;  Surgeon: Tj Mayfield MD;  Location: [facility];  Service: Pain Management;  Laterality: Bilateral;  NEEDS CONSENT    INJECTION OF FACET JOINT Bilateral 3/29/2021    Procedure: INJECTION, FACET JOINT L3/4, L4/5, L5/S1;  Surgeon: Tj Mayfield MD;  Location: [facility];  Service: Pain Management;  Laterality: Bilateral;    INJECTION OF JOINT Right 7/30/2020    Procedure: INJECTION, JOINT, RIGHT HIP Interarticular under flouro;  Surgeon: Tj Mayfield MD;  Location: [facility];  Service: Pain Management;  Laterality: Right;  INJECTION, JOINT, RIGHT HIP Interarticular under flouro    INJECTION OF JOINT Right 2/21/2022    Procedure: Injection, Joint RIGHT ISCHIAL BURSA;  Surgeon: Tj Mayfield MD;  Location: [facility];  Service: Pain Management;  Laterality: Right;    INTRAMEDULLARY RODDING OF FEMUR Left 5/21/2019    Procedure: INSERTION, INTRAMEDULLARY ARIEL, FEMUR;  Surgeon: López Duff MD;  Location: [facility] OR [address] [place] [address];  Service: Orthopedics;  Laterality: Left;    RADIOFREQUENCY ABLATION Left 9/20/2021    Procedure: RADIOFREQUENCY ABLATION left L3,4,5 RFA  1 of 2   CONSENT NEEDED;  Surgeon: Tj Mayfield MD;  Location: [facility];  Service: Pain Management;  Laterality: Left;    RADIOFREQUENCY ABLATION Right 10/4/2021    Procedure: RADIOFREQUENCY ABLATION  right L3,4,5 RFA   2 of 2  CONSENT NEEDED;  Surgeon: Tj Mayfield MD;  Location: [facility];  Service: Pain Management;  Laterality: Right;    RADIOFREQUENCY ABLATION Left 4/21/2022    Procedure: Radiofrequency Ablation LEFT L3,L4,L5;  Surgeon: Tj Mayfield MD;  Location: [facility];  Service: Pain Management;  Laterality: Left;    RADIOFREQUENCY ABLATION Right 5/12/2022    Procedure: Radiofrequency Ablation RIGHT L3,L4,L5;  Surgeon: Tj Mayfield MD;  Location: [facility];  Service: Pain Management;  Laterality: Right;    REPAIR OF TRICEPS TENDON Left 10/17/2018    Procedure: REPAIR, TENDON, TRICEPS left elbow;  Surgeon: Staci Yarbrough MD;  [facility];  Service: Orthopedics;  Laterality: Left;  Anesthesia: General and regional. PRONE, k-wire , hand pan 1 and pan 2, CALL ARTHREX/Tamera notified 10-12 LO    TONSILLECTOMY      TRANSFORAMINAL EPIDURAL INJECTION OF STEROID Right 8/31/2020    Procedure: INJECTION, STEROID, EPIDURAL, TRANSFORAMINAL,  APPROACH, L3-L4 and L4-L5 need consent;  Surgeon: Tj Mayfield MD;  Location: [facility];  Service: Pain Management;  Laterality: Right;    TRANSFORAMINAL EPIDURAL INJECTION OF STEROID Bilateral 7/23/2021    Procedure: INJECTION, STEROID, EPIDURAL, TRANSFORAMINAL APPROACH L4/5;  Surgeon: Tj Mayfield MD;  Location: [facility];  Service: Pain Management;  Laterality: Bilateral;    TRIGGER POINT INJECTION N/A 7/23/2021    Procedure: INJECTION, TRIGGER POINT SCAPULAR;  Surgeon: Tj Mayfield MD;  Location: [facility];  Service: Pain Management;  Laterality: N/A;    TUBAL LIGATION  2003    UPPER GASTROINTESTINAL ENDOSCOPY      10/11    uterine ablation  2003       Family History   Problem Relation Age of Onset    Cancer Mother          Lung Cancer    Emphysema Mother     Heart attack Mother     Alcohol abuse Mother     Cancer Father         Lung Cancer    Osteoarthritis Father     Lung cancer Father     Skin cancer Father     Alcohol abuse Father     Heart disease Brother     Heart attack Brother     Alcohol abuse Brother     Cirrhosis Brother     Osteoarthritis Paternal Aunt     Retinal detachment Maternal Aunt     No Known Problems Sister     No Known Problems Maternal Uncle     No Known Problems Paternal Uncle     No Known Problems Maternal Grandmother     No Known Problems Maternal Grandfather     No Known Problems Paternal Grandmother     No Known Problems Paternal Grandfather     Colon cancer Neg Hx     Esophageal cancer Neg Hx     Stomach cancer Neg Hx     Celiac disease Neg Hx     Diabetes Neg Hx     Thyroid disease Neg Hx     Amblyopia Neg Hx     Blindness Neg Hx     Cataracts Neg Hx     Glaucoma Neg Hx     Hypertension Neg Hx     Macular degeneration Neg Hx     Strabismus Neg Hx     Stroke Neg Hx        Social History     Socioeconomic History    Marital status:    Tobacco Use    Smoking status: Never Smoker    Smokeless tobacco: Never Used   Substance and Sexual Activity    Alcohol use: Yes     Comment: 2-3 times per year.    Drug use: No    Sexual activity: Yes     Partners: Male     Birth control/protection: Surgical     Social Determinants of Health     Financial Resource Strain: Low Risk     Difficulty of Paying Living Expenses: Not hard at all   Food Insecurity: No Food Insecurity    Worried About Running Out of Food in the Last Year: Never true    Ran Out of Food in the Last Year: Never true   Transportation Needs: No Transportation Needs    Lack of Transportation (Medical): No    Lack of Transportation (Non-Medical): No   Physical Activity: Unknown    Days of Exercise per Week: 2 days   Stress: No Stress Concern Present    Feeling of Stress : Not at all   Social  Connections: Unknown    Frequency of Communication with Friends and Family: More than three times a week    Frequency of Social Gatherings with Friends and Family: Twice a week    Active Member of Clubs or Organizations: Yes    Attends Club or Organization Meetings: 1 to 4 times per year    Marital Status:    Housing Stability: Low Risk     Unable to Pay for Housing in the Last Year: No    Number of Places Lived in the Last Year: 1    Unstable Housing in the Last Year: No

## 2022-07-02 ENCOUNTER — PATIENT MESSAGE (OUTPATIENT)
Dept: ORTHOPEDICS | Facility: CLINIC | Age: 62
End: 2022-07-02
Payer: MEDICARE

## 2022-07-02 NOTE — PATIENT INSTRUCTIONS
We have discussed that surgery is an option to treat your foot arthritis with collapse.  The surgery is called triple arthrodesis and midfoot fusion with achilles lengthening and bone graft harvest.  The surgery would be Overnight observation (home the next day after physical therapy sees you).      I strongly advise you read more about this surgery - a great resource for learning about foot and ankle problems and their treatment options is www.myfootcaremd.org.      We went over the following regarding surgical treatment:    Activity after surgery  You will be Non-weight bearing for 6 weeks on the left lower extremity  You be in splint for 2 weeks, then cast for 4 weeks, and then boot until 12 weeks postop  Surgery is on your non-driving foot.  You will not be able to drive until fully weight bearing on operative extremity.  Ultimately the decision about when to drive is a personal choice based on your assessment of your own ability/safety for operating a motor vehicle.  Anticipated time out of work: N/a  Next steps  At this point, you have decided to think about it and wait to schedule until house it ready

## 2022-07-03 ENCOUNTER — PATIENT MESSAGE (OUTPATIENT)
Dept: PAIN MEDICINE | Facility: CLINIC | Age: 62
End: 2022-07-03
Payer: MEDICARE

## 2022-07-05 ENCOUNTER — PATIENT MESSAGE (OUTPATIENT)
Dept: PHARMACY | Facility: CLINIC | Age: 62
End: 2022-07-05
Payer: MEDICARE

## 2022-07-05 ENCOUNTER — OFFICE VISIT (OUTPATIENT)
Dept: PAIN MEDICINE | Facility: CLINIC | Age: 62
End: 2022-07-05
Attending: ANESTHESIOLOGY
Payer: MEDICARE

## 2022-07-05 VITALS
DIASTOLIC BLOOD PRESSURE: 78 MMHG | HEART RATE: 100 BPM | WEIGHT: 152.13 LBS | OXYGEN SATURATION: 99 % | BODY MASS INDEX: 28.72 KG/M2 | RESPIRATION RATE: 18 BRPM | SYSTOLIC BLOOD PRESSURE: 119 MMHG | HEIGHT: 61 IN | TEMPERATURE: 99 F

## 2022-07-05 DIAGNOSIS — M25.552 LEFT HIP PAIN: Primary | ICD-10-CM

## 2022-07-05 PROBLEM — R53.1 DECREASED STRENGTH, ENDURANCE, AND MOBILITY: Status: ACTIVE | Noted: 2022-07-05

## 2022-07-05 PROBLEM — R68.89 DECREASED STRENGTH, ENDURANCE, AND MOBILITY: Status: ACTIVE | Noted: 2022-07-05

## 2022-07-05 PROBLEM — R68.89 IMPAIRED TOLERANCE OF ACTIVITY: Status: ACTIVE | Noted: 2022-07-05

## 2022-07-05 PROBLEM — Z74.09 DECREASED STRENGTH, ENDURANCE, AND MOBILITY: Status: ACTIVE | Noted: 2022-07-05

## 2022-07-05 PROCEDURE — 3008F PR BODY MASS INDEX (BMI) DOCUMENTED: ICD-10-PCS | Mod: CPTII,S$GLB,, | Performed by: ANESTHESIOLOGY

## 2022-07-05 PROCEDURE — 3078F DIAST BP <80 MM HG: CPT | Mod: CPTII,S$GLB,, | Performed by: ANESTHESIOLOGY

## 2022-07-05 PROCEDURE — 99999 PR PBB SHADOW E&M-EST. PATIENT-LVL V: CPT | Mod: PBBFAC,,, | Performed by: ANESTHESIOLOGY

## 2022-07-05 PROCEDURE — 3044F HG A1C LEVEL LT 7.0%: CPT | Mod: CPTII,S$GLB,, | Performed by: ANESTHESIOLOGY

## 2022-07-05 PROCEDURE — 1160F PR REVIEW ALL MEDS BY PRESCRIBER/CLIN PHARMACIST DOCUMENTED: ICD-10-PCS | Mod: CPTII,S$GLB,, | Performed by: ANESTHESIOLOGY

## 2022-07-05 PROCEDURE — 1159F PR MEDICATION LIST DOCUMENTED IN MEDICAL RECORD: ICD-10-PCS | Mod: CPTII,S$GLB,, | Performed by: ANESTHESIOLOGY

## 2022-07-05 PROCEDURE — 3074F PR MOST RECENT SYSTOLIC BLOOD PRESSURE < 130 MM HG: ICD-10-PCS | Mod: CPTII,S$GLB,, | Performed by: ANESTHESIOLOGY

## 2022-07-05 PROCEDURE — 1160F RVW MEDS BY RX/DR IN RCRD: CPT | Mod: CPTII,S$GLB,, | Performed by: ANESTHESIOLOGY

## 2022-07-05 PROCEDURE — 99214 OFFICE O/P EST MOD 30 MIN: CPT | Mod: GC,S$GLB,, | Performed by: ANESTHESIOLOGY

## 2022-07-05 PROCEDURE — 3008F BODY MASS INDEX DOCD: CPT | Mod: CPTII,S$GLB,, | Performed by: ANESTHESIOLOGY

## 2022-07-05 PROCEDURE — 3074F SYST BP LT 130 MM HG: CPT | Mod: CPTII,S$GLB,, | Performed by: ANESTHESIOLOGY

## 2022-07-05 PROCEDURE — 1159F MED LIST DOCD IN RCRD: CPT | Mod: CPTII,S$GLB,, | Performed by: ANESTHESIOLOGY

## 2022-07-05 PROCEDURE — 99214 PR OFFICE/OUTPT VISIT, EST, LEVL IV, 30-39 MIN: ICD-10-PCS | Mod: GC,S$GLB,, | Performed by: ANESTHESIOLOGY

## 2022-07-05 PROCEDURE — 3044F PR MOST RECENT HEMOGLOBIN A1C LEVEL <7.0%: ICD-10-PCS | Mod: CPTII,S$GLB,, | Performed by: ANESTHESIOLOGY

## 2022-07-05 PROCEDURE — 3078F PR MOST RECENT DIASTOLIC BLOOD PRESSURE < 80 MM HG: ICD-10-PCS | Mod: CPTII,S$GLB,, | Performed by: ANESTHESIOLOGY

## 2022-07-05 PROCEDURE — 99999 PR PBB SHADOW E&M-EST. PATIENT-LVL V: ICD-10-PCS | Mod: PBBFAC,,, | Performed by: ANESTHESIOLOGY

## 2022-07-05 RX ORDER — ALPRAZOLAM 0.5 MG/1
TABLET ORAL
Qty: 1 TABLET | Refills: 0 | Status: SHIPPED | OUTPATIENT
Start: 2022-07-05 | End: 2023-05-15 | Stop reason: SDUPTHER

## 2022-07-05 NOTE — PROGRESS NOTES
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 if needed for anxiety). 1 tablet 0    halobetasol propion-tazarotene (DUOBRII) 0.01-0.045 % Lotn aaa on feet and hands qhs  then vaseline and warm towel (Patient not taking: Reported on 7/5/2022) 100 g 3     No current facility-administered medications for this visit.       REVIEW OF SYSTEMS:    GENERAL:  No weight loss, malaise or fevers.  HEENT:  Negative for frequent or significant headaches.  NECK:  Negative for lumps, goiter, pain and significant neck swelling.  RESPIRATORY:  Negative for cough, wheezing or shortness of breath. Asthma.  CARDIOVASCULAR:  Negative for chest pain, leg swelling or palpitations. CAD.  GI:  Negative for abdominal discomfort, blood in stools or black stools or change in bowel habits.  MUSCULOSKELETAL:  See HPI.  SKIN:  Negative for lesions, rash, and itching.  PSYCH:  Negative for sleep disturbance, mood disorder and recent psychosocial stressors.  HEMATOLOGY/LYMPHOLOGY:  Negative for prolonged bleeding, bruising easily or swollen nodes.  NEURO:   No history of headaches, syncope, paralysis, seizures or tremors.  All other reviewed and negative other than HPI.    Past Medical History:  Past Medical History:   Diagnosis Date    Acid reflux     Allergy     Anemia     Asthma     Coronary artery disease     CS (cervical spondylosis) 3/8/2013    Degenerative disc  disease     Dry eyes     Dry mouth     Gastroparesis     History of methotrexate therapy 1/19/2022    Hyperlipidemia     Lateral meniscus derangement 4/6/2016    Lobular carcinoma in situ     Lumbar spondylosis 3/8/2013    Osteoarthritis     Osteoporosis     Rheumatoid arthritis(714.0)     Rupture of left triceps tendon 10/17/2018    Umbilical hernia 8/13/2015       Past Surgical History:  Past Surgical History:   Procedure Laterality Date    BREAST BIOPSY Left 01/29/2002    core bx    CARPAL TUNNEL RELEASE Right 05/2017    CHOLECYSTECTOMY  2004    COLONOSCOPY      10/11    COLONOSCOPY N/A 6/29/2017    Procedure: COLONOSCOPY;  Surgeon: López Moore MD;  [facility]  Service: Endoscopy;  Laterality: N/A;    EPIDURAL STEROID INJECTION N/A 11/18/2021    Procedure: INJECTION, STEROID, EPIDURAL, L5-S1 IL NEED CONSENT;  Surgeon: Tj Mayfield MD;  Location: [facility] PAIN MGT;  Service: Pain Management;  Laterality: N/A;    INJECTION OF ANESTHETIC AGENT AROUND NERVE Bilateral 8/23/2021    Procedure: BLOCK, NERVE, MEDIAL BRANCH L3,L4,L5;  Surgeon: Tj Mayfield MD;  Location: [facility] PAIN MGT;  Service: Pain Management;  Laterality: Bilateral;  1 of 2    INJECTION OF ANESTHETIC AGENT AROUND NERVE Bilateral 8/26/2021    Procedure: BLOCK, NERVE, MEDIAL BRANCH L3,L4,L5;  Surgeon: Tj Mayfield MD;  Location: [facility] PAIN MGT;  Service: Pain Management;  Laterality: Bilateral;  2 of 2    INJECTION OF FACET JOINT Bilateral 3/12/2020    Procedure: FACET JOINT INJECTION (LUMBAR BLOCK) BILATERAL L4-5 AND L5-S1 DIRECT REFERRAL;  Surgeon: Tj Mayfield MD;  Location: [facility] PAIN MGT;  Service: Pain Management;  Laterality: Bilateral;  NEEDS CONSENT    INJECTION OF FACET JOINT Bilateral 3/29/2021    Procedure: INJECTION, FACET JOINT L3/4, L4/5, L5/S1;  Surgeon: Tj Mayfield MD;  Location: [facility] PAIN MGT;  Service: Pain Management;  Laterality: Bilateral;    INJECTION OF JOINT Right 7/30/2020    Procedure:  INJECTION, JOINT, RIGHT HIP Interarticular under flouro;  Surgeon: Tj Mayfield MD;  Location: [facility] PAIN MGT;  Service: Pain Management;  Laterality: Right;  INJECTION, JOINT, RIGHT HIP Interarticular under flouro    INJECTION OF JOINT Right 2/21/2022    Procedure: Injection, Joint RIGHT ISCHIAL BURSA;  Surgeon: Tj Mayfield MD;  Location: [facility] PAIN MGT;  Service: Pain Management;  Laterality: Right;    INTRAMEDULLARY RODDING OF FEMUR Left 5/21/2019    Procedure: INSERTION, INTRAMEDULLARY ARIEL, FEMUR;  Surgeon: López Duff MD;  Location: [facility] OR 2ND FLR;  Service: Orthopedics;  Laterality: Left;    RADIOFREQUENCY ABLATION Left 9/20/2021    Procedure: RADIOFREQUENCY ABLATION left L3,4,5 RFA  1 of 2  CONSENT NEEDED;  Surgeon: Tj Mayfield MD;  Location: [facility] PAIN MGT;  Service: Pain Management;  Laterality: Left;    RADIOFREQUENCY ABLATION Right 10/4/2021    Procedure: RADIOFREQUENCY ABLATION  right L3,4,5 RFA   2 of 2  CONSENT NEEDED;  Surgeon: Tj Mayfield MD;  Location: [facility] PAIN MGT;  Service: Pain Management;  Laterality: Right;    RADIOFREQUENCY ABLATION Left 4/21/2022    Procedure: Radiofrequency Ablation LEFT L3,L4,L5;  Surgeon: Tj Mayfield MD;  Location: [facility] PAIN MGT;  Service: Pain Management;  Laterality: Left;    RADIOFREQUENCY ABLATION Right 5/12/2022    Procedure: Radiofrequency Ablation RIGHT L3,L4,L5;  Surgeon: Tj Mayfield MD;  Location: [facility] PAIN MGT;  Service: Pain Management;  Laterality: Right;    REPAIR OF TRICEPS TENDON Left 10/17/2018    Procedure: REPAIR, TENDON, TRICEPS left elbow;  Surgeon: Staci Yarbrough MD;  Location: [facility] OR 1ST FLR;  Service: Orthopedics;  Laterality: Left;  Anesthesia: General and regional. PRONE, k-wire , hand pan 1 and pan 2, CALL ARTHREX/Tamera notified 10-12 LO    TONSILLECTOMY      TRANSFORAMINAL EPIDURAL INJECTION OF STEROID Right 8/31/2020    Procedure: INJECTION, STEROID, EPIDURAL, TRANSFORAMINAL,  APPROACH, L3-L4 and L4-L5 need  consent;  Surgeon: Tj Mayfield MD;  Location: [facility] PAIN MGT;  Service: Pain Management;  Laterality: Right;    TRANSFORAMINAL EPIDURAL INJECTION OF STEROID Bilateral 7/23/2021    Procedure: INJECTION, STEROID, EPIDURAL, TRANSFORAMINAL APPROACH L4/5;  Surgeon: Tj Mayfield MD;  Location: [facility] PAIN MGT;  Service: Pain Management;  Laterality: Bilateral;    TRIGGER POINT INJECTION N/A 7/23/2021    Procedure: INJECTION, TRIGGER POINT SCAPULAR;  Surgeon: Tj Mayfield MD;  Location: [facility] PAIN MGT;  Service: Pain Management;  Laterality: N/A;    TUBAL LIGATION  2003    UPPER GASTROINTESTINAL ENDOSCOPY      10/11    uterine ablation  2003       Family History:  Family History   Problem Relation Age of Onset    Cancer Mother         Lung Cancer    Emphysema Mother     Heart attack Mother     Alcohol abuse Mother     Cancer Father         Lung Cancer    Osteoarthritis Father     Lung cancer Father     Skin cancer Father     Alcohol abuse Father     Heart disease Brother     Heart attack Brother     Alcohol abuse Brother     Cirrhosis Brother     Osteoarthritis Paternal Aunt     Retinal detachment Maternal Aunt     No Known Problems Sister     No Known Problems Maternal Uncle     No Known Problems Paternal Uncle     No Known Problems Maternal Grandmother     No Known Problems Maternal Grandfather     No Known Problems Paternal Grandmother     No Known Problems Paternal Grandfather     Colon cancer Neg Hx     Esophageal cancer Neg Hx     Stomach cancer Neg Hx     Celiac disease Neg Hx     Diabetes Neg Hx     Thyroid disease Neg Hx     Amblyopia Neg Hx     Blindness Neg Hx     Cataracts Neg Hx     Glaucoma Neg Hx     Hypertension Neg Hx     Macular degeneration Neg Hx     Strabismus Neg Hx     Stroke Neg Hx        Social History:  Social History     Socioeconomic History    Marital status:    Tobacco Use    Smoking status: Never Smoker    Smokeless tobacco: Never Used  "  Substance and Sexual Activity    Alcohol use: Yes     Comment: 2-3 times per year.    Drug use: No    Sexual activity: Yes     Partners: Male     Birth control/protection: Surgical     Social Determinants of Health     Financial Resource Strain: Low Risk     Difficulty of Paying Living Expenses: Not hard at all   Food Insecurity: No Food Insecurity    Worried About Running Out of Food in the Last Year: Never true    Ran Out of Food in the Last Year: Never true   Transportation Needs: No Transportation Needs    Lack of Transportation (Medical): No    Lack of Transportation (Non-Medical): No   Physical Activity: Unknown    Days of Exercise per Week: 2 days   Stress: No Stress Concern Present    Feeling of Stress : Not at all   Social Connections: Unknown    Frequency of Communication with Friends and Family: More than three times a week    Frequency of Social Gatherings with Friends and Family: Twice a week    Active Member of Clubs or Organizations: Yes    Attends Club or Organization Meetings: 1 to 4 times per year    Marital Status:    Housing Stability: Low Risk     Unable to Pay for Housing in the Last Year: No    Number of Places Lived in the Last Year: 1    Unstable Housing in the Last Year: No       OBJECTIVE:    /78   Pulse 100   Temp 99.2 °F (37.3 °C)   Resp 18   Ht 5' 1" (1.549 m)   Wt 69 kg (152 lb 1.9 oz)   LMP  (LMP Unknown)   SpO2 99%   BMI 28.74 kg/m²     PHYSICAL EXAMINATION:    General appearance: Well appearing, in no acute distress, alert.  Psych:  Mood and affect appropriate.  Back: TTP over lumbar facet joints bilaterally. Limited ROM with pain on lumbar extension.  Negligible pain on lumbar flexion.  Positive facet loading bilaterally L>R. Negative SLR bilaterally.   MSK: TTP to right ischial bursa which reproduces radicular symptoms. No pain on movement of right knee. No pain on extension of right knee. TTP at left lateral and anterior hip. TTP over left " GTB. Pain with manipulation of left hip.  Neuro: No loss of sensation.  Kelsey is negative bilaterally.  Gait: Antalgic- ambulates with wheelchair.    ASSESSMENT: 61 y.o. year old female with  Left sided back and hip pain, consistent with the following diagnoses:     Left Hip pain    PLAN:   We discussed with the patient the assessment and recommendations. The following is the plan we agreed on:   - CT Left Hip findings/images reviewed with patient    - Per Dr. Duff note on 6/28/22, left hip nondisplaced intertrochanteric/subtrochanteric partial fracture not concerning with cephalomedullary nail still in place form 2019.  Weight-bearing as tolerated with exercises of left hip.    - Patient may benefit from left hip coolief procedure    - Plan for left femoral and left obturator nerve block for left hip pain.    - Follow up via phone after procedure with plan for left hip coolief if patient has pain relief from hip block.    The above plan and management options were discussed at length with patient. Patient is in agreement with the above and verbalized understanding.    Yolanda Fischer  07/05/2022    I spent a total of 30 minutes on the day of the visit.  This includes face to face time and non-face to face time preparing to see the patient by reviewing previous labs/imaging, obtaining and/or reviewing separately obtained history, documenting clinical information in the electronic or other health record, independently interpreting results and communicating results to the patient/family/caregiver.      I have personally taken the history and examined this patient and agree with the fellow's note as stated above.

## 2022-07-05 NOTE — H&P (VIEW-ONLY)
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 if needed for anxiety). 1 tablet 0    halobetasol propion-tazarotene (DUOBRII) 0.01-0.045 % Lotn aaa on feet and hands qhs  then vaseline and warm towel (Patient not taking: Reported on 7/5/2022) 100 g 3     No current facility-administered medications for this visit.       REVIEW OF SYSTEMS:    GENERAL:  No weight loss, malaise or fevers.  HEENT:  Negative for frequent or significant headaches.  NECK:  Negative for lumps, goiter, pain and significant neck swelling.  RESPIRATORY:  Negative for cough, wheezing or shortness of breath. Asthma.  CARDIOVASCULAR:  Negative for chest pain, leg swelling or palpitations. CAD.  GI:  Negative for abdominal discomfort, blood in stools or black stools or change in bowel habits.  MUSCULOSKELETAL:  See HPI.  SKIN:  Negative for lesions, rash, and itching.  PSYCH:  Negative for sleep disturbance, mood disorder and recent psychosocial stressors.  HEMATOLOGY/LYMPHOLOGY:  Negative for prolonged bleeding, bruising easily or swollen nodes.  NEURO:   No history of headaches, syncope, paralysis, seizures or tremors.  All other reviewed and negative other than HPI.    Past Medical History:  Past Medical History:   Diagnosis Date    Acid reflux     Allergy     Anemia     Asthma     Coronary artery disease     CS (cervical spondylosis) 3/8/2013    Degenerative disc  disease     Dry eyes     Dry mouth     Gastroparesis     History of methotrexate therapy 1/19/2022    Hyperlipidemia     Lateral meniscus derangement 4/6/2016    Lobular carcinoma in situ     Lumbar spondylosis 3/8/2013    Osteoarthritis     Osteoporosis     Rheumatoid arthritis(714.0)     Rupture of left triceps tendon 10/17/2018    Umbilical hernia 8/13/2015       Past Surgical History:  Past Surgical History:   Procedure Laterality Date    BREAST BIOPSY Left 01/29/2002    core bx    CARPAL TUNNEL RELEASE Right 05/2017    CHOLECYSTECTOMY  2004    COLONOSCOPY      10/11    COLONOSCOPY N/A 6/29/2017    Procedure: COLONOSCOPY;  Surgeon: López Moore MD;  [facility]  Service: Endoscopy;  Laterality: N/A;    EPIDURAL STEROID INJECTION N/A 11/18/2021    Procedure: INJECTION, STEROID, EPIDURAL, L5-S1 IL NEED CONSENT;  Surgeon: Tj Mayfield MD;  Location: [facility], [place] PAIN MGT;  Service: Pain Management;  Laterality: N/A;    INJECTION OF ANESTHETIC AGENT AROUND NERVE Bilateral 8/23/2021    Procedure: BLOCK, NERVE, MEDIAL BRANCH L3,L4,L5;  Surgeon: Tj Mayfield MD;  Location: [facility], [place] PAIN MGT;  Service: Pain Management;  Laterality: Bilateral;  1 of 2    INJECTION OF ANESTHETIC AGENT AROUND NERVE Bilateral 8/26/2021    Procedure: BLOCK, NERVE, MEDIAL BRANCH L3,L4,L5;  Surgeon: Tj Mayfield MD;  Location: [facility] [place] PAIN MGT;  Service: Pain Management;  Laterality: Bilateral;  2 of 2    INJECTION OF FACET JOINT Bilateral 3/12/2020    Procedure: FACET JOINT INJECTION (LUMBAR BLOCK) BILATERAL L4-5 AND L5-S1 DIRECT REFERRAL;  Surgeon: Tj Mayfield MD;  Location: [facility] [place] PAIN MGT;  Service: Pain Management;  Laterality: Bilateral;  NEEDS CONSENT    INJECTION OF FACET JOINT Bilateral 3/29/2021    Procedure: INJECTION, FACET JOINT L3/4, L4/5, L5/S1;  Surgeon: Tj Mayfield MD;  Location: [facility] [place] PAIN MGT;  Service: Pain Management;  Laterality: Bilateral;    INJECTION OF JOINT Right 7/30/2020    Procedure:  INJECTION, JOINT, RIGHT HIP Interarticular under flouro;  Surgeon: Tj Mayfield MD;  Location: [facility] PAIN MGT;  Service: Pain Management;  Laterality: Right;  INJECTION, JOINT, RIGHT HIP Interarticular under flouro    INJECTION OF JOINT Right 2/21/2022    Procedure: Injection, Joint RIGHT ISCHIAL BURSA;  Surgeon: Tj Mayfield MD;  Location: [facility] PAIN MGT;  Service: Pain Management;  Laterality: Right;    INTRAMEDULLARY RODDING OF FEMUR Left 5/21/2019    Procedure: INSERTION, INTRAMEDULLARY ARIEL, FEMUR;  Surgeon: López Duff MD;  Location: [facility] OR 2ND FLR;  Service: Orthopedics;  Laterality: Left;    RADIOFREQUENCY ABLATION Left 9/20/2021    Procedure: RADIOFREQUENCY ABLATION left L3,4,5 RFA  1 of 2  CONSENT NEEDED;  Surgeon: Tj Mayfield MD;  Location: [facility] PAIN MGT;  Service: Pain Management;  Laterality: Left;    RADIOFREQUENCY ABLATION Right 10/4/2021    Procedure: RADIOFREQUENCY ABLATION  right L3,4,5 RFA   2 of 2  CONSENT NEEDED;  Surgeon: Tj Myafield MD;  Location: [facility] PAIN MGT;  Service: Pain Management;  Laterality: Right;    RADIOFREQUENCY ABLATION Left 4/21/2022    Procedure: Radiofrequency Ablation LEFT L3,L4,L5;  Surgeon: Tj Mayfield MD;  Location: [facility] PAIN MGT;  Service: Pain Management;  Laterality: Left;    RADIOFREQUENCY ABLATION Right 5/12/2022    Procedure: Radiofrequency Ablation RIGHT L3,L4,L5;  Surgeon: Tj Mayfield MD;  Location: [facility] PAIN MGT;  Service: Pain Management;  Laterality: Right;    REPAIR OF TRICEPS TENDON Left 10/17/2018    Procedure: REPAIR, TENDON, TRICEPS left elbow;  Surgeon: Staci Yarbrough MD;  Location: [facility] OR 1ST FLR;  Service: Orthopedics;  Laterality: Left;  Anesthesia: General and regional. PRONE, k-wire , hand pan 1 and pan 2, CALL ARTHREX/Tamera notified 10-12 LO    TONSILLECTOMY      TRANSFORAMINAL EPIDURAL INJECTION OF STEROID Right 8/31/2020    Procedure: INJECTION, STEROID, EPIDURAL, TRANSFORAMINAL,  APPROACH, L3-L4 and L4-L5 need  consent;  Surgeon: Tj Mayfield MD;  Location: [facility]GT;  Service: Pain Management;  Laterality: Right;    TRANSFORAMINAL EPIDURAL INJECTION OF STEROID Bilateral 7/23/2021    Procedure: INJECTION, STEROID, EPIDURAL, TRANSFORAMINAL APPROACH L4/5;  Surgeon: Tj Mayfield MD;  Location: [facility] PAIN MGT;  Service: Pain Management;  Laterality: Bilateral;    TRIGGER POINT INJECTION N/A 7/23/2021    Procedure: INJECTION, TRIGGER POINT SCAPULAR;  Surgeon: Tj Mayfield MD;  Location: [facility] PAIN MGT;  Service: Pain Management;  Laterality: N/A;    TUBAL LIGATION  2003    UPPER GASTROINTESTINAL ENDOSCOPY      10/11    uterine ablation  2003       Family History:  Family History   Problem Relation Age of Onset    Cancer Mother         Lung Cancer    Emphysema Mother     Heart attack Mother     Alcohol abuse Mother     Cancer Father         Lung Cancer    Osteoarthritis Father     Lung cancer Father     Skin cancer Father     Alcohol abuse Father     Heart disease Brother     Heart attack Brother     Alcohol abuse Brother     Cirrhosis Brother     Osteoarthritis Paternal Aunt     Retinal detachment Maternal Aunt     No Known Problems Sister     No Known Problems Maternal Uncle     No Known Problems Paternal Uncle     No Known Problems Maternal Grandmother     No Known Problems Maternal Grandfather     No Known Problems Paternal Grandmother     No Known Problems Paternal Grandfather     Colon cancer Neg Hx     Esophageal cancer Neg Hx     Stomach cancer Neg Hx     Celiac disease Neg Hx     Diabetes Neg Hx     Thyroid disease Neg Hx     Amblyopia Neg Hx     Blindness Neg Hx     Cataracts Neg Hx     Glaucoma Neg Hx     Hypertension Neg Hx     Macular degeneration Neg Hx     Strabismus Neg Hx     Stroke Neg Hx        Social History:  Social History     Socioeconomic History    Marital status:    Tobacco Use    Smoking status: Never Smoker    Smokeless tobacco: Never Used  "  Substance and Sexual Activity    Alcohol use: Yes     Comment: 2-3 times per year.    Drug use: No    Sexual activity: Yes     Partners: Male     Birth control/protection: Surgical     Social Determinants of Health     Financial Resource Strain: Low Risk     Difficulty of Paying Living Expenses: Not hard at all   Food Insecurity: No Food Insecurity    Worried About Running Out of Food in the Last Year: Never true    Ran Out of Food in the Last Year: Never true   Transportation Needs: No Transportation Needs    Lack of Transportation (Medical): No    Lack of Transportation (Non-Medical): No   Physical Activity: Unknown    Days of Exercise per Week: 2 days   Stress: No Stress Concern Present    Feeling of Stress : Not at all   Social Connections: Unknown    Frequency of Communication with Friends and Family: More than three times a week    Frequency of Social Gatherings with Friends and Family: Twice a week    Active Member of Clubs or Organizations: Yes    Attends Club or Organization Meetings: 1 to 4 times per year    Marital Status:    Housing Stability: Low Risk     Unable to Pay for Housing in the Last Year: No    Number of Places Lived in the Last Year: 1    Unstable Housing in the Last Year: No       OBJECTIVE:    /78   Pulse 100   Temp 99.2 °F (37.3 °C)   Resp 18   Ht 5' 1" (1.549 m)   Wt 69 kg (152 lb 1.9 oz)   LMP  (LMP Unknown)   SpO2 99%   BMI 28.74 kg/m²     PHYSICAL EXAMINATION:    General appearance: Well appearing, in no acute distress, alert.  Psych:  Mood and affect appropriate.  Back: TTP over lumbar facet joints bilaterally. Limited ROM with pain on lumbar extension.  Negligible pain on lumbar flexion.  Positive facet loading bilaterally L>R. Negative SLR bilaterally.   MSK: TTP to right ischial bursa which reproduces radicular symptoms. No pain on movement of right knee. No pain on extension of right knee. TTP at left lateral and anterior hip. TTP over left " GTB. Pain with manipulation of left hip.  Neuro: No loss of sensation.  Kelsey is negative bilaterally.  Gait: Antalgic- ambulates with wheelchair.    ASSESSMENT: 61 y.o. year old female with  Left sided back and hip pain, consistent with the following diagnoses:     Left Hip pain    PLAN:   We discussed with the patient the assessment and recommendations. The following is the plan we agreed on:   - CT Left Hip findings/images reviewed with patient    - Per Dr. Duff note on 6/28/22, left hip nondisplaced intertrochanteric/subtrochanteric partial fracture not concerning with cephalomedullary nail still in place form 2019.  Weight-bearing as tolerated with exercises of left hip.    - Patient may benefit from left hip coolief procedure    - Plan for left femoral and left obturator nerve block for left hip pain.    - Follow up via phone after procedure with plan for left hip coolief if patient has pain relief from hip block.    The above plan and management options were discussed at length with patient. Patient is in agreement with the above and verbalized understanding.    Yolanda Fischer  07/05/2022    I spent a total of 30 minutes on the day of the visit.  This includes face to face time and non-face to face time preparing to see the patient by reviewing previous labs/imaging, obtaining and/or reviewing separately obtained history, documenting clinical information in the electronic or other health record, independently interpreting results and communicating results to the patient/family/caregiver.      I have personally taken the history and examined this patient and agree with the fellow's note as stated above.

## 2022-07-05 NOTE — H&P (VIEW-ONLY)
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 if needed for anxiety). 1 tablet 0    halobetasol propion-tazarotene (DUOBRII) 0.01-0.045 % Lotn aaa on feet and hands qhs  then vaseline and warm towel (Patient not taking: Reported on 7/5/2022) 100 g 3     No current facility-administered medications for this visit.       REVIEW OF SYSTEMS:    GENERAL:  No weight loss, malaise or fevers.  HEENT:  Negative for frequent or significant headaches.  NECK:  Negative for lumps, goiter, pain and significant neck swelling.  RESPIRATORY:  Negative for cough, wheezing or shortness of breath. Asthma.  CARDIOVASCULAR:  Negative for chest pain, leg swelling or palpitations. CAD.  GI:  Negative for abdominal discomfort, blood in stools or black stools or change in bowel habits.  MUSCULOSKELETAL:  See HPI.  SKIN:  Negative for lesions, rash, and itching.  PSYCH:  Negative for sleep disturbance, mood disorder and recent psychosocial stressors.  HEMATOLOGY/LYMPHOLOGY:  Negative for prolonged bleeding, bruising easily or swollen nodes.  NEURO:   No history of headaches, syncope, paralysis, seizures or tremors.  All other reviewed and negative other than HPI.    Past Medical History:  Past Medical History:   Diagnosis Date    Acid reflux     Allergy     Anemia     Asthma     Coronary artery disease     CS (cervical spondylosis) 3/8/2013    Degenerative disc  disease     Dry eyes     Dry mouth     Gastroparesis     History of methotrexate therapy 1/19/2022    Hyperlipidemia     Lateral meniscus derangement 4/6/2016    Lobular carcinoma in situ     Lumbar spondylosis 3/8/2013    Osteoarthritis     Osteoporosis     Rheumatoid arthritis(714.0)     Rupture of left triceps tendon 10/17/2018    Umbilical hernia 8/13/2015       Past Surgical History:  Past Surgical History:   Procedure Laterality Date    BREAST BIOPSY Left 01/29/2002    core bx    CARPAL TUNNEL RELEASE Right 05/2017    CHOLECYSTECTOMY  2004    COLONOSCOPY      10/11    COLONOSCOPY N/A 6/29/2017    Procedure: COLONOSCOPY;  Surgeon: López Moore MD;  [facility]  Service: Endoscopy;  Laterality: N/A;    EPIDURAL STEROID INJECTION N/A 11/18/2021    Procedure: INJECTION, STEROID, EPIDURAL, L5-S1 IL NEED CONSENT;  Surgeon: Tj Mayfield MD;  Location: [facility] PAIN MGT;  Service: Pain Management;  Laterality: N/A;    INJECTION OF ANESTHETIC AGENT AROUND NERVE Bilateral 8/23/2021    Procedure: BLOCK, NERVE, MEDIAL BRANCH L3,L4,L5;  Surgeon: Tj Mayfield MD;  Location: [facility] PAIN MGT;  Service: Pain Management;  Laterality: Bilateral;  1 of 2    INJECTION OF ANESTHETIC AGENT AROUND NERVE Bilateral 8/26/2021    Procedure: BLOCK, NERVE, MEDIAL BRANCH L3,L4,L5;  Surgeon: Tj Mayfield MD;  Location: [facility] PAIN MGT;  Service: Pain Management;  Laterality: Bilateral;  2 of 2    INJECTION OF FACET JOINT Bilateral 3/12/2020    Procedure: FACET JOINT INJECTION (LUMBAR BLOCK) BILATERAL L4-5 AND L5-S1 DIRECT REFERRAL;  Surgeon: Tj Mayfield MD;  Location: [facility] PAIN MGT;  Service: Pain Management;  Laterality: Bilateral;  NEEDS CONSENT    INJECTION OF FACET JOINT Bilateral 3/29/2021    Procedure: INJECTION, FACET JOINT L3/4, L4/5, L5/S1;  Surgeon: Tj Mayfield MD;  Location: [facility] PAIN MGT;  Service: Pain Management;  Laterality: Bilateral;    INJECTION OF JOINT Right 7/30/2020    Procedure:  INJECTION, JOINT, RIGHT HIP Interarticular under flouro;  Surgeon: Tj Mayfield MD;  Location: [facility] PAIN MGT;  Service: Pain Management;  Laterality: Right;  INJECTION, JOINT, RIGHT HIP Interarticular under flouro    INJECTION OF JOINT Right 2/21/2022    Procedure: Injection, Joint RIGHT ISCHIAL BURSA;  Surgeon: Tj Mayfield MD;  Location: [facility] PAIN MGT;  Service: Pain Management;  Laterality: Right;    INTRAMEDULLARY RODDING OF FEMUR Left 5/21/2019    Procedure: INSERTION, INTRAMEDULLARY ARIEL, FEMUR;  Surgeon: López Duff MD;  Location: [facility] OR 2ND FLR;  Service: Orthopedics;  Laterality: Left;    RADIOFREQUENCY ABLATION Left 9/20/2021    Procedure: RADIOFREQUENCY ABLATION left L3,4,5 RFA  1 of 2  CONSENT NEEDED;  Surgeon: Tj Mayfield MD;  Location: [facility] PAIN MGT;  Service: Pain Management;  Laterality: Left;    RADIOFREQUENCY ABLATION Right 10/4/2021    Procedure: RADIOFREQUENCY ABLATION  right L3,4,5 RFA   2 of 2  CONSENT NEEDED;  Surgeon: Tj Mayfield MD;  Location: [facility] PAIN MGT;  Service: Pain Management;  Laterality: Right;    RADIOFREQUENCY ABLATION Left 4/21/2022    Procedure: Radiofrequency Ablation LEFT L3,L4,L5;  Surgeon: Tj Mayfield MD;  Location: [facility] PAIN MGT;  Service: Pain Management;  Laterality: Left;    RADIOFREQUENCY ABLATION Right 5/12/2022    Procedure: Radiofrequency Ablation RIGHT L3,L4,L5;  Surgeon: Tj Mayfield MD;  Location: [facility] PAIN MGT;  Service: Pain Management;  Laterality: Right;    REPAIR OF TRICEPS TENDON Left 10/17/2018    Procedure: REPAIR, TENDON, TRICEPS left elbow;  Surgeon: Staci Yarbrough MD;  Location: [facility] OR 1ST FLR;  Service: Orthopedics;  Laterality: Left;  Anesthesia: General and regional. PRONE, k-wire , hand pan 1 and pan 2, CALL ARTHREX/Tamera notified 10-12 LO    TONSILLECTOMY      TRANSFORAMINAL EPIDURAL INJECTION OF STEROID Right 8/31/2020    Procedure: INJECTION, STEROID, EPIDURAL, TRANSFORAMINAL,  APPROACH, L3-L4 and L4-L5 need  consent;  Surgeon: Tj Mayfield MD;  Location: [facility] PAIN MGT;  Service: Pain Management;  Laterality: Right;    TRANSFORAMINAL EPIDURAL INJECTION OF STEROID Bilateral 7/23/2021    Procedure: INJECTION, STEROID, EPIDURAL, TRANSFORAMINAL APPROACH L4/5;  Surgeon: Tj Mayfield MD;  Location: [facility] PAIN MGT;  Service: Pain Management;  Laterality: Bilateral;    TRIGGER POINT INJECTION N/A 7/23/2021    Procedure: INJECTION, TRIGGER POINT SCAPULAR;  Surgeon: Tj Mayfield MD;  Location: [facility] PAIN MGT;  Service: Pain Management;  Laterality: N/A;    TUBAL LIGATION  2003    UPPER GASTROINTESTINAL ENDOSCOPY      10/11    uterine ablation  2003       Family History:  Family History   Problem Relation Age of Onset    Cancer Mother         Lung Cancer    Emphysema Mother     Heart attack Mother     Alcohol abuse Mother     Cancer Father         Lung Cancer    Osteoarthritis Father     Lung cancer Father     Skin cancer Father     Alcohol abuse Father     Heart disease Brother     Heart attack Brother     Alcohol abuse Brother     Cirrhosis Brother     Osteoarthritis Paternal Aunt     Retinal detachment Maternal Aunt     No Known Problems Sister     No Known Problems Maternal Uncle     No Known Problems Paternal Uncle     No Known Problems Maternal Grandmother     No Known Problems Maternal Grandfather     No Known Problems Paternal Grandmother     No Known Problems Paternal Grandfather     Colon cancer Neg Hx     Esophageal cancer Neg Hx     Stomach cancer Neg Hx     Celiac disease Neg Hx     Diabetes Neg Hx     Thyroid disease Neg Hx     Amblyopia Neg Hx     Blindness Neg Hx     Cataracts Neg Hx     Glaucoma Neg Hx     Hypertension Neg Hx     Macular degeneration Neg Hx     Strabismus Neg Hx     Stroke Neg Hx        Social History:  Social History     Socioeconomic History    Marital status:    Tobacco Use    Smoking status: Never Smoker    Smokeless tobacco: Never Used  "  Substance and Sexual Activity    Alcohol use: Yes     Comment: 2-3 times per year.    Drug use: No    Sexual activity: Yes     Partners: Male     Birth control/protection: Surgical     Social Determinants of Health     Financial Resource Strain: Low Risk     Difficulty of Paying Living Expenses: Not hard at all   Food Insecurity: No Food Insecurity    Worried About Running Out of Food in the Last Year: Never true    Ran Out of Food in the Last Year: Never true   Transportation Needs: No Transportation Needs    Lack of Transportation (Medical): No    Lack of Transportation (Non-Medical): No   Physical Activity: Unknown    Days of Exercise per Week: 2 days   Stress: No Stress Concern Present    Feeling of Stress : Not at all   Social Connections: Unknown    Frequency of Communication with Friends and Family: More than three times a week    Frequency of Social Gatherings with Friends and Family: Twice a week    Active Member of Clubs or Organizations: Yes    Attends Club or Organization Meetings: 1 to 4 times per year    Marital Status:    Housing Stability: Low Risk     Unable to Pay for Housing in the Last Year: No    Number of Places Lived in the Last Year: 1    Unstable Housing in the Last Year: No       OBJECTIVE:    /78   Pulse 100   Temp 99.2 °F (37.3 °C)   Resp 18   Ht 5' 1" (1.549 m)   Wt 69 kg (152 lb 1.9 oz)   LMP  (LMP Unknown)   SpO2 99%   BMI 28.74 kg/m²     PHYSICAL EXAMINATION:    General appearance: Well appearing, in no acute distress, alert.  Psych:  Mood and affect appropriate.  Back: TTP over lumbar facet joints bilaterally. Limited ROM with pain on lumbar extension.  Negligible pain on lumbar flexion.  Positive facet loading bilaterally L>R. Negative SLR bilaterally.   MSK: TTP to right ischial bursa which reproduces radicular symptoms. No pain on movement of right knee. No pain on extension of right knee. TTP at left lateral and anterior hip. TTP over left " GTB. Pain with manipulation of left hip.  Neuro: No loss of sensation.  Kelsey is negative bilaterally.  Gait: Antalgic- ambulates with wheelchair.    ASSESSMENT: 61 y.o. year old female with  Left sided back and hip pain, consistent with the following diagnoses:     Left Hip pain    PLAN:   We discussed with the patient the assessment and recommendations. The following is the plan we agreed on:   - CT Left Hip findings/images reviewed with patient    - Per Dr. Duff note on 6/28/22, left hip nondisplaced intertrochanteric/subtrochanteric partial fracture not concerning with cephalomedullary nail still in place form 2019.  Weight-bearing as tolerated with exercises of left hip.    - Patient may benefit from left hip coolief procedure    - Plan for left femoral and left obturator nerve block for left hip pain.    - Follow up via phone after procedure with plan for left hip coolief if patient has pain relief from hip block.    The above plan and management options were discussed at length with patient. Patient is in agreement with the above and verbalized understanding.    Yolanda Fischer  07/05/2022    I spent a total of 30 minutes on the day of the visit.  This includes face to face time and non-face to face time preparing to see the patient by reviewing previous labs/imaging, obtaining and/or reviewing separately obtained history, documenting clinical information in the electronic or other health record, independently interpreting results and communicating results to the patient/family/caregiver.      I have personally taken the history and examined this patient and agree with the fellow's note as stated above.

## 2022-07-06 ENCOUNTER — PATIENT MESSAGE (OUTPATIENT)
Dept: PAIN MEDICINE | Facility: OTHER | Age: 62
End: 2022-07-06
Payer: MEDICARE

## 2022-07-06 ENCOUNTER — TELEPHONE (OUTPATIENT)
Dept: ADMINISTRATIVE | Facility: OTHER | Age: 62
End: 2022-07-06
Payer: MEDICARE

## 2022-07-07 ENCOUNTER — HOSPITAL ENCOUNTER (OUTPATIENT)
Facility: OTHER | Age: 62
Discharge: HOME | End: 2022-07-07
Attending: ANESTHESIOLOGY | Admitting: ANESTHESIOLOGY
Payer: MEDICARE

## 2022-07-07 ENCOUNTER — TELEPHONE (OUTPATIENT)
Dept: ORTHOPEDICS | Facility: CLINIC | Age: 62
End: 2022-07-07
Payer: MEDICARE

## 2022-07-07 VITALS
BODY MASS INDEX: 28.13 KG/M2 | OXYGEN SATURATION: 100 % | TEMPERATURE: 100 F | HEIGHT: 61 IN | SYSTOLIC BLOOD PRESSURE: 189 MMHG | DIASTOLIC BLOOD PRESSURE: 88 MMHG | HEART RATE: 89 BPM | RESPIRATION RATE: 16 BRPM | WEIGHT: 149 LBS

## 2022-07-07 DIAGNOSIS — M16.12 OSTEOARTHRITIS OF LEFT HIP, UNSPECIFIED OSTEOARTHRITIS TYPE: Primary | ICD-10-CM

## 2022-07-07 DIAGNOSIS — M15.9 OSTEOARTHRITIS INVOLVING MULTIPLE JOINTS ON BOTH SIDES OF BODY: ICD-10-CM

## 2022-07-07 DIAGNOSIS — G89.29 CHRONIC PAIN: ICD-10-CM

## 2022-07-07 PROCEDURE — 64447 NJX AA&/STRD FEMORAL NRV IMG: CPT | Mod: LT,,, | Performed by: ANESTHESIOLOGY

## 2022-07-07 PROCEDURE — 64447 PR NERVE BLOCK INJ, ANES/STEROID, FEMORAL, INCL IMAG GUIDANCE: ICD-10-PCS | Mod: LT,,, | Performed by: ANESTHESIOLOGY

## 2022-07-07 PROCEDURE — 64450 NJX AA&/STRD OTHER PN/BRANCH: CPT | Mod: LT | Performed by: ANESTHESIOLOGY

## 2022-07-07 PROCEDURE — 25000003 PHARM REV CODE 250: Performed by: ANESTHESIOLOGY

## 2022-07-07 PROCEDURE — 64450 PR NERVE BLOCK INJ, ANES/STEROID, OTHER PERIPHERAL: ICD-10-PCS | Mod: LT,,, | Performed by: ANESTHESIOLOGY

## 2022-07-07 PROCEDURE — 64450 NJX AA&/STRD OTHER PN/BRANCH: CPT | Mod: LT,,, | Performed by: ANESTHESIOLOGY

## 2022-07-07 PROCEDURE — 64447 NJX AA&/STRD FEMORAL NRV IMG: CPT | Mod: LT | Performed by: ANESTHESIOLOGY

## 2022-07-07 RX ORDER — BUPIVACAINE HYDROCHLORIDE 2.5 MG/ML
INJECTION, SOLUTION EPIDURAL; INFILTRATION; INTRACAUDAL
Status: DISCONTINUED | OUTPATIENT
Start: 2022-07-07 | End: 2022-07-07 | Stop reason: HOSPADM

## 2022-07-07 RX ORDER — LIDOCAINE HYDROCHLORIDE 20 MG/ML
INJECTION, SOLUTION INFILTRATION; PERINEURAL
Status: DISCONTINUED | OUTPATIENT
Start: 2022-07-07 | End: 2022-07-07 | Stop reason: HOSPADM

## 2022-07-07 RX ORDER — SODIUM CHLORIDE 9 MG/ML
500 INJECTION, SOLUTION INTRAVENOUS CONTINUOUS
Status: DISCONTINUED | OUTPATIENT
Start: 2022-07-07 | End: 2022-07-07 | Stop reason: HOSPADM

## 2022-07-07 NOTE — TELEPHONE ENCOUNTER
Spoke with pt and offered to schedule an appointment with [facility] as instructed by Dr Duff.  Pt declined and stated that she saw Dr Mayfield today for a procedure and will call should she change her mind and decide to follow up with Spine.

## 2022-07-07 NOTE — BRIEF OP NOTE
Discharge Note  Short Stay      SUMMARY     Admit Date: 7/7/2022    Attending Physician: Tj Mayfield      Discharge Physician: Tj Mayfield      Discharge Date: 7/7/2022 12:38 PM    Procedure(s) (LRB):  BLOCK, NERVE, LEFT OBTURATOR AND FEMORAL (Left)    Final Diagnosis: Left hip pain [M25.552]    Disposition: Home or self care    Patient Instructions:   Current Discharge Medication List      CONTINUE these medications which have NOT CHANGED    Details   albuterol (PROVENTIL/VENTOLIN HFA) 90 mcg/actuation inhaler INHALE 2 PUFFS INTO THE LUNGS EVERY 6 (SIX) HOURS AS NEEDED. RESCUE  Qty: 20.1 g, Refills: 11      albuterol-ipratropium (DUO-NEB) 2.5 mg-0.5 mg/3 mL nebulizer solution Take 3 mLs by nebulization every 6 (six) hours as needed for Wheezing. Rescue  Qty: 90 mL, Refills: 3    Associated Diagnoses: Moderate persistent asthma with acute exacerbation; Bronchitis      ALPRAZolam (XANAX) 0.5 MG tablet Take Once on call to procedure  Qty: 1 tablet, Refills: 0    Associated Diagnoses: Left hip pain      aspirin 81 mg Tab Take 81 mg by mouth every morning.       budesonide-formoterol 160-4.5 mcg (SYMBICORT) 160-4.5 mcg/actuation HFAA INHALE 2 PUFFS INTO THE LUNGS EVERY 12 (TWELVE) HOURS.  Qty: 30.6 g, Refills: 3    Associated Diagnoses: Chronic asthma, mild intermittent, uncomplicated      calcium citrate-vitamin D3 315-200 mg (CITRACAL+D) 315 mg-5 mcg (200 unit) per tablet Take 1 tablet by mouth 2 (two) times daily.       cycloSPORINE (RESTASIS) 0.05 % ophthalmic emulsion Instill one drop into both eyes two times per day  Qty: 60 each, Refills: 10      diazePAM (VALIUM) 10 MG Tab Take 1 tablet (10 mg total) by mouth as needed (take 30 mins prior to MRI if needed for anxiety).  Qty: 1 tablet, Refills: 0      diclofenac sodium (VOLTAREN) 1 % Gel APPLY 2 GRAMS TOPICALLY 4 (FOUR) TIMES DAILY AS NEEDED.  Qty: 300 g, Refills: 2      estrogens, conjugated, (PREMARIN) 0.625 MG tablet take 1 tablet by mouth daily  Qty: 90  tablet, Refills: 4      fluconazole (DIFLUCAN) 100 MG tablet Take 1 tablet by mouth once daily for 14 days.  Qty: 14 tablet, Refills: 0      halobetasol propion-tazarotene (DUOBRII) 0.01-0.045 % Lotn aaa on feet and hands qhs  then vaseline and warm towel  Qty: 100 g, Refills: 3    Associated Diagnoses: Psoriasiform dermatitis      INV PROPULSID 10 MG Take 2 tablets (20 mg) by mouth 4 (four) times daily. FOR INVESTIGATIONAL USE ONLY. Protocol [id number]  Subject ID: [id number].  Qty: 1440 each, Refills: 0    Associated Diagnoses: Patient in clinical research study; Gastroparesis      ketoconazole (NIZORAL) 2 % cream Apply to feet twice daily for 3 weeks  Qty: 60 g, Refills: 3    Associated Diagnoses: Tinea pedis of both feet      leflunomide (ARAVA) 20 MG Tab Take 1 tablet (20 mg total) by mouth once daily.  Qty: 90 tablet, Refills: 0    Associated Diagnoses: Rheumatoid arthritis      lifitegrast (XIIDRA) 5 % Dpet INSTILL ONE DROP INTO BOTH EYES TWICE A DAY  Qty: 60 mL, Refills: 10      methenamine (HIPREX) 1 gram Tab Take 1 tablet (1 g total) by mouth 2 (two) times daily.  Qty: 180 tablet, Refills: 3      methocarbamoL (ROBAXIN) 500 MG Tab Take 2 tablets (1,000 mg total) by mouth 3 (three) times daily as needed (muscle pain).  Qty: 180 tablet, Refills: 0      mirabegron (MYRBETRIQ) 25 mg Tb24 ER tablet Take 1 tablet (25 mg total) by mouth once daily.  Qty: 90 tablet, Refills: 3    Associated Diagnoses: Urinary urgency; Urge urinary incontinence      montelukast (SINGULAIR) 10 mg tablet TAKE 1 TABLET BY MOUTH EVERY DAY AT NIGHT  Qty: 90 tablet, Refills: 3    Associated Diagnoses: Perennial allergic rhinitis      naproxen (NAPROSYN) 500 MG tablet Take 1 tablet (500 mg total) by mouth 2 (two) times daily as needed.  Qty: 180 tablet, Refills: 0      omeprazole (PRILOSEC) 40 MG capsule Take 1 capsule (40 mg total) by mouth every morning.  Qty: 30 capsule, Refills: 6      omeprazole-sodium bicarbonate (ZEGERID) 40-1.1  mg-gram per capsule TAKE 1 CAPSULE BY MOUTH EVERY DAY IN THE MORNING  Qty: 90 capsule, Refills: 3      POTASSIUM ORAL Take by mouth once daily.      !! predniSONE (DELTASONE) 1 MG tablet Take 5 tablets (5 mg total) by mouth once daily.  Qty: 450 tablet, Refills: 1      !! predniSONE (DELTASONE) 5 MG tablet Take 1 tablet (5 mg total) by mouth once daily.  Qty: 90 tablet, Refills: 1      pregabalin (LYRICA) 50 MG capsule Take 1 capsule (50 mg total) by mouth every evening.  Qty: 90 capsule, Refills: 1    Associated Diagnoses: Fibromyalgia      PREMARIN vaginal cream PLACE 0.5 GRAM VAGINALLY 3 (THREE) TIMES A WEEK.  Qty: 30 g, Refills: 1    Comments: PT REQUESTED REFILLS      !! progesterone (PROMETRIUM) 100 MG capsule Take 1 capsule by mouth daily.  Qty: 90 capsule, Refills: 1    Associated Diagnoses: Unspecified ovarian cyst, unspecified side; Other specified counseling      !! progesterone (PROMETRIUM) 100 MG capsule take 1 capsule by mouth daily  Qty: 90 capsule, Refills: 4      promethazine (PHENERGAN) 25 MG tablet Take 1 tablet (25 mg total) by mouth every 6 (six) hours as needed for Nausea.  Qty: 30 tablet, Refills: 1      rosuvastatin (CRESTOR) 20 MG tablet Take 1 tablet (20 mg total) by mouth every evening.  Qty: 90 tablet, Refills: 3    Associated Diagnoses: Coronary artery disease involving native coronary artery of native heart without angina pectoris      sarilumab (KEVZARA) 200 mg/1.14 mL Syrg Inject 1.14 mL (200 mg total) into the skin every 14 (fourteen) days.  Qty: 2.28 mL, Refills: 2    Associated Diagnoses: Rheumatoid arthritis, involving unspecified site, unspecified whether rheumatoid factor present      sucralfate (CARAFATE) 1 gram tablet Take 1 tablet (1 g total) by mouth 4 (four) times daily.  Qty: 360 tablet, Refills: 3    Associated Diagnoses: Gastroparesis      traMADoL (ULTRAM) 50 mg tablet TAKE 1 TABLET (50 MG TOTAL) BY MOUTH EVERY 12 (TWELVE) HOURS AS NEEDED FOR PAIN.  Qty: 60 tablet,  Refills: 1    Associated Diagnoses: Chronic hip pain, left       !! - Potential duplicate medications found. Please discuss with provider.              Discharge Diagnosis: Left hip pain [M25.552]  Condition on Discharge: Stable with no complications to procedure   Diet on Discharge: Same as before.  Activity: as per instruction sheet.  Discharge to: Home with a responsible adult.  Follow up: 2-4 weeks       Please call my office or pager at [phone] if experienced any weakness or loss of sensation, fever > 101.5, pain uncontrolled with oral medications, persistent nausea/vomiting/or diarrhea, redness or drainage from the incisions, or any other worrisome concerns. If physician on call was not reached or could not communicate with our office for any reason please go to the nearest emergency department

## 2022-07-07 NOTE — DISCHARGE INSTRUCTIONS
Lumbar/Cervical/Joint Medial Branch Block Pain Diary    Patient Name:  :    Pre-procedure Pain Score: _____/10    Time of injection:     Please fill out the chart below to the best of your ability. We need to know how much percentage of relief in your pain that you have while the numbing medication is active. Please be mindful that this is a diagnostic test and may not last for a long time. If you are asleep during one of the times listed below, you do not have to record that time.     Time After the   Procedure % of pain relief   achieved Pain Score (0-10)   1 hour post-procedure     2 hours post-procedure     3 hours post-procedure     4 hours post-procedure     5 hours post-procedure     6 hours post-procedure     12 hours post-procedure     24 hours post-procedure       Please make sure to return this form or information to the clinic. This information is needed to proceed with your plan of care. There are several options that you can use to send this back to us.    Form can be faxed to us at [phone]  Call us to provide the information at [phone]  Send the information to us through your [facility] Chart    If you have any questions about completing this diary, please call us at [phone].    Thank you for allowing us to care for you today. You may receive a survey about the care we provided. Your feedback is valuable and helps us provide excellent care throughout the community.     Home Care Instructions for Pain Management:    1. DIET:   You may resume your normal diet today.   2. BATHING:   You may shower with luke warm water. No tub baths or anything that will soak injection sites under water for the next 24 hours.  3. DRESSING:   You may remove your bandage today.   4. ACTIVITY LEVEL:   You may resume your normal activities 24 hrs after your procedure. Nothing strenuous today.  5. MEDICATIONS:   You may resume your normal medications today. To restart blood thinners, ask your doctor.  6.  DRIVING    If you have received any sedatives by mouth today, you may not drive for 12 hours.    If you have received any sedation through your IV, you may not drive for 24 hrs.   7. SPECIAL INSTRUCTIONS:   No heat to the injection site for 24 hrs including, hot bath or shower, heating pad, moist heat, or hot tubs.    Use ice pack to injection site for any pain or discomfort.  Apply ice packs for 20 minute intervals as needed.    IF you have diabetes, be sure to monitor your blood sugar more closely. IF your injection contained steroids your blood sugar levels may become higher than normal.    If you are still having pain upon discharge:  Your pain may improve over the next 48 hours. The anesthetic (numbing medication) works immediately to 48 hours. IF your injection contained a steroid (anti-inflammatory medication), it takes approximately 3 days to start feeling relief and 7-10 days to see your greatest results from the medication. It is possible you may need subsequent injections. This would be discussed at your follow up appointment with pain management or your referring doctor.    Please call the PAIN MANAGEMENT office at [phone] or ON CALL pager at [phone] if you experienced any:   -Weakness or loss of sensation  -Fever > 101.5  -Pain uncontrolled with oral medications   -Persistent nausea, vomiting, or diarrhea  -Redness or drainage from the injection sites, or any other worrisome concerns.   If physician on call was not reached or could not communicate with our office for any reason please go to the nearest emergency department.

## 2022-07-07 NOTE — OP NOTE
Diagnostic Femoral and Obturator Sensory Block under Fluoroscopic Guidance    The procedure, risks, benefits, and options were discussed with the patient. There are no contraindications to the procedure. The patent expressed understanding and agreed to the procedure. Informed written consent was obtained prior to the start of the procedure and can be found in the patient's chart.    PATIENT NAME: Joyce Peterson   MRN: [id number]     DATE OF PROCEDURE: 07/07/2022    PROCEDURE: Diagnostic Left Femoral and Obturator Sensory Block under Fluoroscopic Guidance    PRE-OP DIAGNOSIS: Left hip pain [M25.552]    POST-OP DIAGNOSIS: Same    PHYSICIAN: Tj Mayfield MD    ASSISTANTS: none     MEDICATIONS INJECTED: Bupivacine 0.25%     LOCAL ANESTHETIC INJECTED: Xylocaine 2%     SEDATION: None    ESTIMATED BLOOD LOSS: None    COMPLICATIONS: None    INTERVAL HISTORY: Patient has clinical findings of chronic hip pain that is not relieved by other therapies.     TECHNIQUE: Time-out was performed to identify the patient and procedure to be performed. With the patient laying in a supine position, the surgical area was prepped and draped in the usual sterile fashion using ChloraPrep and a fenestrated drape. The area was determined under fluoroscopy guidance. Skin anesthesia was achieved by injecting Lidocaine 2% over the injection sites. A 25 gauge, 5 inch spinal quinke needle was introduced into the approximate areas of the sensory branch of the femoral nerve to the hip superior medially to the femoral head and the sensory branch of the obturator nerves to the hip at the incisura.  Once os was contacted and the final needle tip position confirmed on fluoroscopy, negative pressure was applied to confirm no intravascular placement.  2 mL of the anesthetic listed above was then slowly injected at each site. The needles were removed and bleeding was nil. A sterile dressing was applied. No specimens collected. The patient tolerated the  procedure well.   PAIN BEFORE THE PROCEDURE: 9/10    PAIN AFTER THE PROCEDURE: 4-5/10    The patient was monitored after the procedure in the recovery area. They were given post-procedure and discharge instructions to follow at home. The patient was discharged in a stable condition.        Tj Mayfield MD

## 2022-07-08 ENCOUNTER — TELEPHONE (OUTPATIENT)
Dept: PAIN MEDICINE | Facility: OTHER | Age: 62
End: 2022-07-08
Payer: MEDICARE

## 2022-07-08 ENCOUNTER — PATIENT MESSAGE (OUTPATIENT)
Dept: PAIN MEDICINE | Facility: CLINIC | Age: 62
End: 2022-07-08
Payer: MEDICARE

## 2022-07-08 ENCOUNTER — SPECIALTY PHARMACY (OUTPATIENT)
Dept: PHARMACY | Facility: CLINIC | Age: 62
End: 2022-07-08
Payer: MEDICARE

## 2022-07-08 ENCOUNTER — PATIENT MESSAGE (OUTPATIENT)
Dept: PAIN MEDICINE | Facility: OTHER | Age: 62
End: 2022-07-08
Payer: MEDICARE

## 2022-07-08 NOTE — TELEPHONE ENCOUNTER
Staff talked to pt and pt stated that she received 80% out of 100% from BLOCK, NERVE, LEFT OBTURATOR AND FEMORAL done on 07/07 by Dr. Mayfield. Sg

## 2022-07-08 NOTE — TELEPHONE ENCOUNTER
Specialty Pharmacy - Refill Coordination    Specialty Medication Orders Linked to Encounter    Flowsheet Row Most Recent Value   Medication #1 sarilumab (KEVZARA) 200 mg/1.14 mL Syrg (Order#[id number], Rx#[id number])          Refill Questions - Documented Responses    Flowsheet Row Most Recent Value   Patient Availability and HIPAA Verification    Does patient want to proceed with activity? Yes   HIPAA/medical authority confirmed? Yes   Relationship to patient of person spoken to? Self   Refill Screening Questions    Changes to allergies? No   Changes to medications? No   New conditions since last clinic visit? No   Unplanned office visit, urgent care, ED, or hospital admission in the last 4 weeks? No   How does patient/caregiver feel medication is working? Good   Financial problems or insurance changes? No   How many doses of your specialty medications were missed in the last 4 weeks? 0   Would patient like to speak to a pharmacist? No   When does the patient need to receive the medication? 07/12/22   Refill Delivery Questions    How will the patient receive the medication? Delivery Jaymie   When does the patient need to receive the medication? 07/12/22   Shipping Address Home   Address in [facility] confirmed and updated if neccessary? Yes   Expected Copay ($) 55   Is the patient able to afford the medication copay? Yes   Payment Method CC on file   Days supply of Refill 28   Supplies needed? No supplies needed   Refill activity completed? Yes   Refill activity plan Refill scheduled   Shipment/Pickup Date: 07/11/22          Current Outpatient Medications   Medication Sig    albuterol (PROVENTIL/VENTOLIN HFA) 90 mcg/actuation inhaler INHALE 2 PUFFS INTO THE LUNGS EVERY 6 (SIX) HOURS AS NEEDED. RESCUE    albuterol-ipratropium (DUO-NEB) 2.5 mg-0.5 mg/3 mL nebulizer solution Take 3 mLs by nebulization every 6 (six) hours as needed for Wheezing. Rescue    ALPRAZolam (XANAX) 0.5 MG tablet Take Once on call to  procedure    aspirin 81 mg Tab Take 81 mg by mouth every morning.     budesonide-formoterol 160-4.5 mcg (SYMBICORT) 160-4.5 mcg/actuation HFAA INHALE 2 PUFFS INTO THE LUNGS EVERY 12 (TWELVE) HOURS.    calcium citrate-vitamin D3 315-200 mg (CITRACAL+D) 315 mg-5 mcg (200 unit) per tablet Take 1 tablet by mouth 2 (two) times daily.     cycloSPORINE (RESTASIS) 0.05 % ophthalmic emulsion Instill one drop into both eyes two times per day    diazePAM (VALIUM) 10 MG Tab Take 1 tablet (10 mg total) by mouth as needed (take 30 mins prior to MRI if needed for anxiety).    diclofenac sodium (VOLTAREN) 1 % Gel APPLY 2 GRAMS TOPICALLY 4 (FOUR) TIMES DAILY AS NEEDED.    estrogens, conjugated, (PREMARIN) 0.625 MG tablet take 1 tablet by mouth daily    fluconazole (DIFLUCAN) 100 MG tablet Take 1 tablet by mouth once daily for 14 days.    halobetasol propion-tazarotene (DUOBRII) 0.01-0.045 % Lotn aaa on feet and hands qhs  then vaseline and warm towel (Patient not taking: Reported on 7/5/2022)    INV PROPULSID 10 MG Take 2 tablets (20 mg) by mouth 4 (four) times daily. FOR INVESTIGATIONAL USE ONLY. Protocol CIS-USA-154  Subject ID: [id number].    ketoconazole (NIZORAL) 2 % cream Apply to feet twice daily for 3 weeks (Patient taking differently: Apply to feet twice daily for 3 weeks)    leflunomide (ARAVA) 20 MG Tab Take 1 tablet (20 mg total) by mouth once daily.    lifitegrast (XIIDRA) 5 % Dpet INSTILL ONE DROP INTO BOTH EYES TWICE A DAY (Patient taking differently: INSTILL ONE DROP INTO BOTH EYES TWICE A DAY)    methenamine (HIPREX) 1 gram Tab Take 1 tablet (1 g total) by mouth 2 (two) times daily.    methocarbamoL (ROBAXIN) 500 MG Tab Take 2 tablets (1,000 mg total) by mouth 3 (three) times daily as needed (muscle pain).    mirabegron (MYRBETRIQ) 25 mg Tb24 ER tablet Take 1 tablet (25 mg total) by mouth once daily.    montelukast (SINGULAIR) 10 mg tablet TAKE 1 TABLET BY MOUTH EVERY DAY AT NIGHT    naproxen  (NAPROSYN) 500 MG tablet Take 1 tablet (500 mg total) by mouth 2 (two) times daily as needed.    omeprazole (PRILOSEC) 40 MG capsule Take 1 capsule (40 mg total) by mouth every morning.    omeprazole-sodium bicarbonate (ZEGERID) 40-1.1 mg-gram per capsule TAKE 1 CAPSULE BY MOUTH EVERY DAY IN THE MORNING    POTASSIUM ORAL Take by mouth once daily.    predniSONE (DELTASONE) 1 MG tablet Take 5 tablets (5 mg total) by mouth once daily.    predniSONE (DELTASONE) 5 MG tablet Take 1 tablet (5 mg total) by mouth once daily.    pregabalin (LYRICA) 50 MG capsule Take 1 capsule (50 mg total) by mouth every evening.    PREMARIN vaginal cream PLACE 0.5 GRAM VAGINALLY 3 (THREE) TIMES A WEEK.    progesterone (PROMETRIUM) 100 MG capsule Take 1 capsule by mouth daily.    progesterone (PROMETRIUM) 100 MG capsule take 1 capsule by mouth daily    promethazine (PHENERGAN) 25 MG tablet Take 1 tablet (25 mg total) by mouth every 6 (six) hours as needed for Nausea.    rosuvastatin (CRESTOR) 20 MG tablet Take 1 tablet (20 mg total) by mouth every evening.    sarilumab (KEVZARA) 200 mg/1.14 mL Syrg Inject 1.14 mL (200 mg total) into the skin every 14 (fourteen) days.    sucralfate (CARAFATE) 1 gram tablet Take 1 tablet (1 g total) by mouth 4 (four) times daily.    traMADoL (ULTRAM) 50 mg tablet TAKE 1 TABLET (50 MG TOTAL) BY MOUTH EVERY 12 (TWELVE) HOURS AS NEEDED FOR PAIN.   Last reviewed on 7/7/2022 11:10 AM by Monica Miller RN    Review of patient's allergies indicates:   Allergen Reactions    Actemra [tocilizumab] Other (See Comments)     Severe dizziness    Codeine Nausea And Vomiting and Hives    Gold au 198 Hives and Rash    Hydroxychloroquine Other (See Comments)     Can't remember the reaction      Iodinated contrast media Other (See Comments)     Other reaction(s): BURNING ALL OVER    Iodine Other (See Comments) and Hives     Other reaction(s): BURNING ALL OVER - Iodine dye - Not topical    Ondansetron  "Nausea And Vomiting    Sulfa (sulfonamide antibiotics) Other (See Comments)     Can't remember the reaction    Zofran [ondansetron hcl (pf)] Nausea And Vomiting     Pt reports that last time she received zofran she started vomiting again    Methotrexate analogues Nausea Only    Pneumococcal vaccine Other (See Comments)    Pneumovax 23 [pneumococcal 23-argenis ps vaccine] Other (See Comments)     "sick"    Methotrexate Nausea Only    Last reviewed on  7/7/2022 11:10 AM by Monica Miller      Tasks added this encounter   8/2/2022 - Refill Call (Auto Added)   Tasks due within next 3 months   No tasks due.     [facility]  [address] [facility] [address]  Phone: [phone]  Fax: [phone]      "

## 2022-07-08 NOTE — TELEPHONE ENCOUNTER
7/7/22 MBB:       Pain level before the procedure was 9 out of 10.   1 hour after the procedure pain level was 2 out of 10.   Patient reports overall relief from MBB to be at 80% better for 1 day.

## 2022-07-09 DIAGNOSIS — M16.12 OSTEOARTHRITIS OF LEFT HIP, UNSPECIFIED OSTEOARTHRITIS TYPE: Primary | ICD-10-CM

## 2022-07-09 DIAGNOSIS — M25.552 LEFT HIP PAIN: ICD-10-CM

## 2022-07-11 ENCOUNTER — PATIENT MESSAGE (OUTPATIENT)
Dept: PAIN MEDICINE | Facility: OTHER | Age: 62
End: 2022-07-11
Payer: MEDICARE

## 2022-07-11 ENCOUNTER — TELEPHONE (OUTPATIENT)
Dept: PAIN MEDICINE | Facility: CLINIC | Age: 62
End: 2022-07-11
Payer: MEDICARE

## 2022-07-11 ENCOUNTER — TELEPHONE (OUTPATIENT)
Dept: SPINE | Facility: CLINIC | Age: 62
End: 2022-07-11
Payer: MEDICARE

## 2022-07-11 DIAGNOSIS — M16.12 OSTEOARTHRITIS OF LEFT HIP, UNSPECIFIED OSTEOARTHRITIS TYPE: Primary | ICD-10-CM

## 2022-07-11 DIAGNOSIS — M25.552 LEFT HIP PAIN: ICD-10-CM

## 2022-07-11 NOTE — TELEPHONE ENCOUNTER
----- Message from Rosario Peck sent at 7/11/2022 10:14 AM CDT -----  Regarding: Orders  Name of Who is Calling:TONNY GOMEZ [[id number]]          What is the request in detail: Patient is requesting a call back in reference to orders for nerve block           Can the clinic reply by [facility]: no           What Number to Call Back if not in [facility]: [phone]

## 2022-07-13 RX ORDER — FLUCONAZOLE 100 MG/1
TABLET ORAL
Qty: 14 TABLET | Refills: 0 | Status: SHIPPED | OUTPATIENT
Start: 2022-07-13 | End: 2022-09-09 | Stop reason: SDUPTHER

## 2022-07-19 ENCOUNTER — TELEPHONE (OUTPATIENT)
Dept: SPINE | Facility: CLINIC | Age: 62
End: 2022-07-19
Payer: MEDICARE

## 2022-07-19 NOTE — TELEPHONE ENCOUNTER
----- Message from Kassidy Flores sent at 7/19/2022  8:25 AM CDT -----  Regarding: sooner appt  Name of Who is Calling: TONNY GOMEZ [[id number]]      What is the request in detail:Patient is scheduled for a procedure on  07/25/22 she would like to know if there is a sooner appt available for an injection       Can the clinic reply by [facility]: no      What Number to Call Back if not in [facility]: [phone]

## 2022-07-20 ENCOUNTER — PATIENT MESSAGE (OUTPATIENT)
Dept: PAIN MEDICINE | Facility: OTHER | Age: 62
End: 2022-07-20
Payer: MEDICARE

## 2022-07-22 ENCOUNTER — PATIENT MESSAGE (OUTPATIENT)
Dept: PAIN MEDICINE | Facility: OTHER | Age: 62
End: 2022-07-22
Payer: MEDICARE

## 2022-07-25 ENCOUNTER — HOSPITAL ENCOUNTER (OUTPATIENT)
Facility: OTHER | Age: 62
Discharge: HOME | End: 2022-07-25
Attending: ANESTHESIOLOGY | Admitting: ANESTHESIOLOGY
Payer: MEDICARE

## 2022-07-25 VITALS
RESPIRATION RATE: 16 BRPM | WEIGHT: 149 LBS | HEIGHT: 61 IN | SYSTOLIC BLOOD PRESSURE: 135 MMHG | BODY MASS INDEX: 28.13 KG/M2 | OXYGEN SATURATION: 95 % | DIASTOLIC BLOOD PRESSURE: 86 MMHG | TEMPERATURE: 98 F | HEART RATE: 95 BPM

## 2022-07-25 DIAGNOSIS — G89.29 CHRONIC PAIN: ICD-10-CM

## 2022-07-25 DIAGNOSIS — M16.12 OSTEOARTHRITIS OF LEFT HIP, UNSPECIFIED OSTEOARTHRITIS TYPE: Primary | ICD-10-CM

## 2022-07-25 DIAGNOSIS — G89.29 CHRONIC LEFT HIP PAIN: ICD-10-CM

## 2022-07-25 DIAGNOSIS — M25.552 CHRONIC LEFT HIP PAIN: ICD-10-CM

## 2022-07-25 PROCEDURE — 99152 MOD SED SAME PHYS/QHP 5/>YRS: CPT | Mod: ,,, | Performed by: ANESTHESIOLOGY

## 2022-07-25 PROCEDURE — 64640 PR DESTRUCT BY NEURO AGENT; OTHER PERIPH NERVE: ICD-10-PCS | Mod: LT,,, | Performed by: ANESTHESIOLOGY

## 2022-07-25 PROCEDURE — 63600175 PHARM REV CODE 636 W HCPCS: Performed by: ANESTHESIOLOGY

## 2022-07-25 PROCEDURE — 25000003 PHARM REV CODE 250: Performed by: STUDENT IN AN ORGANIZED HEALTH CARE EDUCATION/TRAINING PROGRAM

## 2022-07-25 PROCEDURE — 99152 PR MOD CONSCIOUS SEDATION, SAME PHYS, 5+ YRS, FIRST 15 MIN: ICD-10-PCS | Mod: ,,, | Performed by: ANESTHESIOLOGY

## 2022-07-25 PROCEDURE — 25000003 PHARM REV CODE 250: Performed by: ANESTHESIOLOGY

## 2022-07-25 PROCEDURE — 64640 INJECTION TREATMENT OF NERVE: CPT | Mod: LT | Performed by: ANESTHESIOLOGY

## 2022-07-25 PROCEDURE — 99152 MOD SED SAME PHYS/QHP 5/>YRS: CPT | Performed by: ANESTHESIOLOGY

## 2022-07-25 PROCEDURE — 64640 INJECTION TREATMENT OF NERVE: CPT | Mod: LT,,, | Performed by: ANESTHESIOLOGY

## 2022-07-25 RX ORDER — FENTANYL CITRATE 50 UG/ML
INJECTION, SOLUTION INTRAMUSCULAR; INTRAVENOUS
Status: DISCONTINUED | OUTPATIENT
Start: 2022-07-25 | End: 2022-07-25 | Stop reason: HOSPADM

## 2022-07-25 RX ORDER — MIDAZOLAM HYDROCHLORIDE 1 MG/ML
INJECTION INTRAMUSCULAR; INTRAVENOUS
Status: DISCONTINUED | OUTPATIENT
Start: 2022-07-25 | End: 2022-07-25 | Stop reason: HOSPADM

## 2022-07-25 RX ORDER — LIDOCAINE HYDROCHLORIDE 20 MG/ML
INJECTION, SOLUTION INFILTRATION; PERINEURAL
Status: DISCONTINUED | OUTPATIENT
Start: 2022-07-25 | End: 2022-07-25 | Stop reason: HOSPADM

## 2022-07-25 RX ORDER — TRIAMCINOLONE ACETONIDE 40 MG/ML
INJECTION, SUSPENSION INTRA-ARTICULAR; INTRAMUSCULAR
Status: DISCONTINUED | OUTPATIENT
Start: 2022-07-25 | End: 2022-07-25 | Stop reason: HOSPADM

## 2022-07-25 RX ORDER — BUPIVACAINE HYDROCHLORIDE 2.5 MG/ML
INJECTION, SOLUTION EPIDURAL; INFILTRATION; INTRACAUDAL
Status: DISCONTINUED | OUTPATIENT
Start: 2022-07-25 | End: 2022-07-25 | Stop reason: HOSPADM

## 2022-07-25 RX ORDER — SODIUM CHLORIDE 9 MG/ML
500 INJECTION, SOLUTION INTRAVENOUS CONTINUOUS
Status: DISCONTINUED | OUTPATIENT
Start: 2022-07-25 | End: 2022-07-25 | Stop reason: HOSPADM

## 2022-07-25 NOTE — PLAN OF CARE
Patient tolerated procedure well. Patient reports 0/10 pain after procedure. Assisted out of bed to personal wheelchair by spouse. All discharge instructions given.

## 2022-07-25 NOTE — DISCHARGE INSTRUCTIONS
Thank you for allowing us to care for you today. You may receive a survey about the care we provided. Your feedback is valuable and helps us provide excellent care throughout the community.     Home Care Instructions for Pain Management:    1. DIET:   You may resume your normal diet today.   2. BATHING:   You may shower with luke warm water. No tub baths or anything that will soak injection sites under water for the next 24 hours.  3. DRESSING:   You may remove your bandage today.   4. ACTIVITY LEVEL:   You may resume your normal activities 24 hrs after your procedure. Nothing strenuous today.  5. MEDICATIONS:   You may resume your normal medications today. To restart blood thinners, ask your doctor.  6. DRIVING    If you have received any sedatives by mouth today, you may not drive for 12 hours.    If you have received any sedation through your IV, you may not drive for 24 hrs.   7. SPECIAL INSTRUCTIONS:   No heat to the injection site for 24 hrs including, hot bath or shower, heating pad, moist heat, or hot tubs.    Use ice pack to injection site for any pain or discomfort.  Apply ice packs for 20 minute intervals as needed.    IF you have diabetes, be sure to monitor your blood sugar more closely. IF your injection contained steroids your blood sugar levels may become higher than normal.    If you are still having pain upon discharge:  Your pain may improve over the next 48 hours. The anesthetic (numbing medication) works immediately to 48 hours. IF your injection contained a steroid (anti-inflammatory medication), it takes approximately 3 days to start feeling relief and 7-10 days to see your greatest results from the medication. It is possible you may need subsequent injections. This would be discussed at your follow up appointment with pain management or your referring doctor.    Please call the PAIN MANAGEMENT office at [phone] or ON CALL pager at [phone] if you experienced any:   -Weakness or  loss of sensation  -Fever > 101.5  -Pain uncontrolled with oral medications   -Persistent nausea, vomiting, or diarrhea  -Redness or drainage from the injection sites, or any other worrisome concerns.   If physician on call was not reached or could not communicate with our office for any reason please go to the nearest emergency department. Adult Procedural Sedation Instructions    Recovery After Procedural Sedation (Adult)  You have been given medicine by vein to make you sleep during your surgery. This may have included both a pain medicine and sleeping medicine. Most of the effects have worn off. But you may still have some drowsiness for the next 6 to 8 hours.  Home care  Follow these guidelines when you get home:  For the next 8 hours, you should be watched by a responsible adult. This person should make sure your condition is not getting worse.  Don't drink any alcohol for the next 24 hours.  Don't drive, operate dangerous machinery, or make important business or personal decisions during the next 24 hours.  Note: Your healthcare provider may tell you not to take any medicine by mouth for pain or sleep in the next 4 hours. These medicines may react with the medicines you were given in the hospital. This could cause a much stronger response than usual.  Follow-up care  Follow up with your healthcare provider if you are not alert and back to your usual level of activity within 12 hours.  When to seek medical advice  Call your healthcare provider right away if any of these occur:  Drowsiness gets worse  Weakness or dizziness gets worse  Repeated vomiting  You can't be awakened   Date Last Reviewed: 10/18/2016  © 5981-3773 [employer] [address] [place] [address], [place] [address]. All rights reserved. This information is not intended as a substitute for professional medical care. Always follow your healthcare professional's instructions.

## 2022-07-25 NOTE — DISCHARGE SUMMARY
Discharge Note  Short Stay      SUMMARY     Admit Date: 7/25/2022    Attending Physician: Tj Mayfield      Discharge Physician: Tj Mayfield      Discharge Date: 7/25/2022 2:28 PM    Procedure(s) (LRB):  Radiofrequency Ablation Left Femoral & Obturator (Left)    Final Diagnosis: Osteoarthritis of left hip, unspecified osteoarthritis type [M16.12]  Left hip pain [M25.552]    Disposition: Home or self care    Patient Instructions:   Current Discharge Medication List      CONTINUE these medications which have NOT CHANGED    Details   albuterol (PROVENTIL/VENTOLIN HFA) 90 mcg/actuation inhaler INHALE 2 PUFFS INTO THE LUNGS EVERY 6 (SIX) HOURS AS NEEDED. RESCUE  Qty: 20.1 g, Refills: 11      albuterol-ipratropium (DUO-NEB) 2.5 mg-0.5 mg/3 mL nebulizer solution Take 3 mLs by nebulization every 6 (six) hours as needed for Wheezing. Rescue  Qty: 90 mL, Refills: 3    Associated Diagnoses: Moderate persistent asthma with acute exacerbation; Bronchitis      ALPRAZolam (XANAX) 0.5 MG tablet Take Once on call to procedure  Qty: 1 tablet, Refills: 0    Associated Diagnoses: Left hip pain      aspirin 81 mg Tab Take 81 mg by mouth every morning.       budesonide-formoterol 160-4.5 mcg (SYMBICORT) 160-4.5 mcg/actuation HFAA INHALE 2 PUFFS INTO THE LUNGS EVERY 12 (TWELVE) HOURS.  Qty: 30.6 g, Refills: 3    Associated Diagnoses: Chronic asthma, mild intermittent, uncomplicated      calcium citrate-vitamin D3 315-200 mg (CITRACAL+D) 315 mg-5 mcg (200 unit) per tablet Take 1 tablet by mouth 2 (two) times daily.       cycloSPORINE (RESTASIS) 0.05 % ophthalmic emulsion Instill one drop into both eyes two times per day  Qty: 60 each, Refills: 10      diazePAM (VALIUM) 10 MG Tab Take 1 tablet (10 mg total) by mouth as needed (take 30 mins prior to MRI if needed for anxiety).  Qty: 1 tablet, Refills: 0      diclofenac sodium (VOLTAREN) 1 % Gel APPLY 2 GRAMS TOPICALLY 4 (FOUR) TIMES DAILY AS NEEDED.  Qty: 300 g, Refills: 2      estrogens,  conjugated, (PREMARIN) 0.625 MG tablet take 1 tablet by mouth daily  Qty: 90 tablet, Refills: 4      fluconazole (DIFLUCAN) 100 MG tablet Take 1 tablet by mouth once daily for 14 days.  Qty: 14 tablet, Refills: 0      halobetasol propion-tazarotene (DUOBRII) 0.01-0.045 % Lotn aaa on feet and hands qhs  then vaseline and warm towel  Qty: 100 g, Refills: 3    Associated Diagnoses: Psoriasiform dermatitis      INV PROPULSID 10 MG Take 2 tablets (20 mg) by mouth 4 (four) times daily. FOR INVESTIGATIONAL USE ONLY. Protocol CIS-USA-154  Subject ID: [id number].  Qty: 1440 each, Refills: 0    Associated Diagnoses: Patient in clinical research study; Gastroparesis      ketoconazole (NIZORAL) 2 % cream Apply to feet twice daily for 3 weeks  Qty: 60 g, Refills: 3    Associated Diagnoses: Tinea pedis of both feet      leflunomide (ARAVA) 20 MG Tab Take 1 tablet (20 mg total) by mouth once daily.  Qty: 90 tablet, Refills: 0    Associated Diagnoses: Rheumatoid arthritis      lifitegrast (XIIDRA) 5 % Dpet INSTILL ONE DROP INTO BOTH EYES TWICE A DAY  Qty: 60 mL, Refills: 10      methenamine (HIPREX) 1 gram Tab Take 1 tablet (1 g total) by mouth 2 (two) times daily.  Qty: 180 tablet, Refills: 3      methocarbamoL (ROBAXIN) 500 MG Tab TAKE 2 TABLETS (1,000 MG TOTAL) BY MOUTH 3 (THREE) TIMES DAILY AS NEEDED (MUSCLE PAIN).  Qty: 180 tablet, Refills: 0      mirabegron (MYRBETRIQ) 25 mg Tb24 ER tablet Take 1 tablet (25 mg total) by mouth once daily.  Qty: 90 tablet, Refills: 3    Associated Diagnoses: Urinary urgency; Urge urinary incontinence      montelukast (SINGULAIR) 10 mg tablet TAKE 1 TABLET BY MOUTH EVERY DAY AT NIGHT  Qty: 90 tablet, Refills: 3    Associated Diagnoses: Perennial allergic rhinitis      naproxen (NAPROSYN) 500 MG tablet Take 1 tablet (500 mg total) by mouth 2 (two) times daily as needed.  Qty: 180 tablet, Refills: 0      omeprazole (PRILOSEC) 40 MG capsule Take 1 capsule (40 mg total) by mouth every morning.  Qty: 30  capsule, Refills: 6      omeprazole-sodium bicarbonate (ZEGERID) 40-1.1 mg-gram per capsule TAKE 1 CAPSULE BY MOUTH EVERY DAY IN THE MORNING  Qty: 90 capsule, Refills: 3      POTASSIUM ORAL Take by mouth once daily.      !! predniSONE (DELTASONE) 1 MG tablet Take 5 tablets (5 mg total) by mouth once daily.  Qty: 450 tablet, Refills: 1      !! predniSONE (DELTASONE) 5 MG tablet Take 1 tablet (5 mg total) by mouth once daily.  Qty: 90 tablet, Refills: 1      pregabalin (LYRICA) 50 MG capsule Take 1 capsule (50 mg total) by mouth every evening.  Qty: 90 capsule, Refills: 1    Associated Diagnoses: Fibromyalgia      PREMARIN vaginal cream PLACE 0.5 GRAM VAGINALLY 3 (THREE) TIMES A WEEK.  Qty: 30 g, Refills: 1      !! progesterone (PROMETRIUM) 100 MG capsule Take 1 capsule by mouth daily.  Qty: 90 capsule, Refills: 1    Associated Diagnoses: Unspecified ovarian cyst, unspecified side; Other specified counseling      !! progesterone (PROMETRIUM) 100 MG capsule take 1 capsule by mouth daily  Qty: 90 capsule, Refills: 4      promethazine (PHENERGAN) 25 MG tablet Take 1 tablet (25 mg total) by mouth every 6 (six) hours as needed for Nausea.  Qty: 30 tablet, Refills: 1      rosuvastatin (CRESTOR) 20 MG tablet Take 1 tablet (20 mg total) by mouth every evening.  Qty: 90 tablet, Refills: 3    Associated Diagnoses: Coronary artery disease involving native coronary artery of native heart without angina pectoris      sarilumab (KEVZARA) 200 mg/1.14 mL Syrg Inject 1.14 mL (200 mg total) into the skin every 14 (fourteen) days.  Qty: 2.28 mL, Refills: 2    Associated Diagnoses: Rheumatoid arthritis, involving unspecified site, unspecified whether rheumatoid factor present      sucralfate (CARAFATE) 1 gram tablet Take 1 tablet (1 g total) by mouth 4 (four) times daily.  Qty: 360 tablet, Refills: 3    Associated Diagnoses: Gastroparesis      traMADoL (ULTRAM) 50 mg tablet TAKE 1 TABLET (50 MG TOTAL) BY MOUTH EVERY 12 (TWELVE) HOURS AS  NEEDED FOR PAIN.  Qty: 60 tablet, Refills: 1    Associated Diagnoses: Chronic hip pain, left       !! - Potential duplicate medications found. Please discuss with provider.              Discharge Diagnosis: Osteoarthritis of left hip, unspecified osteoarthritis type [M16.12]  Left hip pain [M25.552]  Condition on Discharge: Stable with no complications to procedure   Diet on Discharge: Same as before.  Activity: as per instruction sheet.  Discharge to: Home with a responsible adult.  Follow up: 2-4 weeks       Please call my office or pager at [phone] if experienced any weakness or loss of sensation, fever > 101.5, pain uncontrolled with oral medications, persistent nausea/vomiting/or diarrhea, redness or drainage from the incisions, or any other worrisome concerns. If physician on call was not reached or could not communicate with our office for any reason please go to the nearest emergency department

## 2022-07-25 NOTE — OP NOTE
Therapeutic Femoral and Obturator Cooled Nerve Radiofrequency Ablation under Fluoroscopy     The procedure, risks, benefits, and options were discussed with the patient. There are no contraindications to the procedure. The patent expressed understanding and agreed to the procedure. Informed written consent was obtained prior to the start of the procedure and can be found in the patient's chart.        PATIENT NAME: Joyce Peterson   MRN: [id number]     DATE OF PROCEDURE: 07/25/2022     PROCEDURE: Therapeutic Left Femoral and Obturator Cooled Nerve Radiofrequency Ablation under Fluoroscopy    PRE-OP DIAGNOSIS: Osteoarthritis of left hip, unspecified osteoarthritis type [M16.12]  Left hip pain [M25.552]    POST-OP DIAGNOSIS: Same    PHYSICIAN: Tj Mayfield MD    ASSISTANTS: None     MEDICATIONS INJECTED:  Preservative-free Kenalog 40mg with 9cc of Bupivicaine 0.25%    LOCAL ANESTHETIC INJECTED:   Xylocaine 2%    SEDATION:   Versed 2mg and Fentanyl 100mcg                                                                                                                                                                                     Conscious sedation ordered by M.D. Patient re-evaluation prior to administration of conscious sedation. No changes noted in patient's status from initial evaluation. The patient's vital signs were monitored by RN and patient remained hemodynamically stable throughout the procedure.    Event Time In   Sedation Start 1402   Sedation End 1428       ESTIMATED BLOOD LOSS:  None    COMPLICATIONS:  None     INTERVAL HISTORY: Patient has clinical findings of chronic hip pain. Patients has completed 2 previous diagnostic femoral and obturator blocks with at least 80% relief for the expected duration of the local anesthetic utilized.     TECHNIQUE: Time-out was performed to identify the patient and procedure to be performed. With the patient laying in a supine position, the surgical area was prepped and  draped in the usual sterile fashion using ChloraPrep and fenestrated drape. The area was determined under fluoroscopic guidance. Skin anesthesia was achieved by injecting Lidocaine 2% over the injection sites. A 17 gauge introducer needle was introduced into the approximate areas of the sensory branch of the femoral nerve to the hip superior medially to the femoral head and the sensory branch of the obturator nerves to the hip at the incisura. The incisura needle was approached from the medial aspect of the thigh. Once os was contacted and the final needle tip position confirmed on fluoroscopy, negative pressure was applied to confirm no intravascular placement. This was followed by motor testing at each of the nerves to ensure that there was no motor involvement. After negative aspiration for blood was confirmed, 1 mL of the lidocaine 2% listed above was injected slowly at each site. This was followed by cooled thermal lesioning 60 degrees at the probe, 80 degree lesion, for 150 seconds at each site. That was followed by slowly injecting 2 mL of the medication mixture listed above at each site. The needles were removed and bleeding was nil. A sterile dressing was applied. No specimens collected. The patient tolerated the procedure well and did not have any procedure related motor deficit at the conclusion of the procedure.    PAIN BEFORE THE PROCEDURE: 9/10    PAIN AFTER THE PROCEDURE: 4/10.    The patient was monitored after the procedure in the recovery area. They were given post-procedure and discharge instructions to follow at home. The patient was discharged in a stable condition.    I reviewed and edited the fellow's note. I conducted my own interview and physical examination. I agree with the findings. I was present and supervising all critical portions of the procedure.    Tj Mayfield MD

## 2022-07-26 ENCOUNTER — PATIENT MESSAGE (OUTPATIENT)
Dept: ENDOCRINOLOGY | Facility: CLINIC | Age: 62
End: 2022-07-26
Payer: MEDICARE

## 2022-07-26 ENCOUNTER — PATIENT MESSAGE (OUTPATIENT)
Dept: RHEUMATOLOGY | Facility: CLINIC | Age: 62
End: 2022-07-26
Payer: MEDICARE

## 2022-07-26 ENCOUNTER — TELEPHONE (OUTPATIENT)
Dept: ENDOCRINOLOGY | Facility: CLINIC | Age: 62
End: 2022-07-26
Payer: MEDICARE

## 2022-07-26 ENCOUNTER — PATIENT MESSAGE (OUTPATIENT)
Dept: PAIN MEDICINE | Facility: CLINIC | Age: 62
End: 2022-07-26
Payer: MEDICARE

## 2022-07-26 DIAGNOSIS — M79.7 FIBROMYALGIA: ICD-10-CM

## 2022-07-26 DIAGNOSIS — G89.29 CHRONIC HIP PAIN, LEFT: ICD-10-CM

## 2022-07-26 DIAGNOSIS — M25.552 CHRONIC HIP PAIN, LEFT: ICD-10-CM

## 2022-07-26 RX ORDER — TRAMADOL HYDROCHLORIDE 50 MG/1
TABLET ORAL
Qty: 60 TABLET | Refills: 1 | Status: SHIPPED | OUTPATIENT
Start: 2022-07-26 | End: 2022-12-20

## 2022-07-26 RX ORDER — PREGABALIN 50 MG/1
100 CAPSULE ORAL NIGHTLY
Qty: 180 CAPSULE | Refills: 1 | Status: SHIPPED | OUTPATIENT
Start: 2022-07-26 | End: 2022-10-06

## 2022-07-26 NOTE — TELEPHONE ENCOUNTER
Spoke with patient scheduled appointment. Patient approved time and date. Also mailed letter as a reminder.

## 2022-08-01 ENCOUNTER — OFFICE VISIT (OUTPATIENT)
Dept: FAMILY MEDICINE | Facility: CLINIC | Age: 62
End: 2022-08-01
Payer: MEDICARE

## 2022-08-01 VITALS
DIASTOLIC BLOOD PRESSURE: 74 MMHG | BODY MASS INDEX: 27.7 KG/M2 | OXYGEN SATURATION: 97 % | HEIGHT: 61 IN | HEART RATE: 107 BPM | SYSTOLIC BLOOD PRESSURE: 118 MMHG | WEIGHT: 146.69 LBS | TEMPERATURE: 98 F

## 2022-08-01 DIAGNOSIS — M25.552 LEFT HIP PAIN: ICD-10-CM

## 2022-08-01 DIAGNOSIS — M81.8 STEROID-INDUCED OSTEOPOROSIS: Chronic | ICD-10-CM

## 2022-08-01 DIAGNOSIS — R74.8 ELEVATED SERUM GGT LEVEL: ICD-10-CM

## 2022-08-01 DIAGNOSIS — N39.41 URGE URINARY INCONTINENCE: ICD-10-CM

## 2022-08-01 DIAGNOSIS — J45.20 MILD INTERMITTENT ASTHMA WITHOUT COMPLICATION: ICD-10-CM

## 2022-08-01 DIAGNOSIS — M05.79 RHEUMATOID ARTHRITIS INVOLVING MULTIPLE SITES WITH POSITIVE RHEUMATOID FACTOR: Chronic | ICD-10-CM

## 2022-08-01 DIAGNOSIS — T38.0X5A STEROID-INDUCED OSTEOPOROSIS: Chronic | ICD-10-CM

## 2022-08-01 DIAGNOSIS — E78.2 MIXED HYPERLIPIDEMIA: Primary | ICD-10-CM

## 2022-08-01 PROCEDURE — 3044F PR MOST RECENT HEMOGLOBIN A1C LEVEL <7.0%: ICD-10-PCS | Mod: CPTII,S$GLB,, | Performed by: STUDENT IN AN ORGANIZED HEALTH CARE EDUCATION/TRAINING PROGRAM

## 2022-08-01 PROCEDURE — 3078F DIAST BP <80 MM HG: CPT | Mod: CPTII,S$GLB,, | Performed by: STUDENT IN AN ORGANIZED HEALTH CARE EDUCATION/TRAINING PROGRAM

## 2022-08-01 PROCEDURE — 3008F PR BODY MASS INDEX (BMI) DOCUMENTED: ICD-10-PCS | Mod: CPTII,S$GLB,, | Performed by: STUDENT IN AN ORGANIZED HEALTH CARE EDUCATION/TRAINING PROGRAM

## 2022-08-01 PROCEDURE — 1159F MED LIST DOCD IN RCRD: CPT | Mod: CPTII,S$GLB,, | Performed by: STUDENT IN AN ORGANIZED HEALTH CARE EDUCATION/TRAINING PROGRAM

## 2022-08-01 PROCEDURE — 99214 PR OFFICE/OUTPT VISIT, EST, LEVL IV, 30-39 MIN: ICD-10-PCS | Mod: S$GLB,,, | Performed by: STUDENT IN AN ORGANIZED HEALTH CARE EDUCATION/TRAINING PROGRAM

## 2022-08-01 PROCEDURE — 3008F BODY MASS INDEX DOCD: CPT | Mod: CPTII,S$GLB,, | Performed by: STUDENT IN AN ORGANIZED HEALTH CARE EDUCATION/TRAINING PROGRAM

## 2022-08-01 PROCEDURE — 99999 PR PBB SHADOW E&M-EST. PATIENT-LVL V: ICD-10-PCS | Mod: PBBFAC,,, | Performed by: STUDENT IN AN ORGANIZED HEALTH CARE EDUCATION/TRAINING PROGRAM

## 2022-08-01 PROCEDURE — 1159F PR MEDICATION LIST DOCUMENTED IN MEDICAL RECORD: ICD-10-PCS | Mod: CPTII,S$GLB,, | Performed by: STUDENT IN AN ORGANIZED HEALTH CARE EDUCATION/TRAINING PROGRAM

## 2022-08-01 PROCEDURE — 3078F PR MOST RECENT DIASTOLIC BLOOD PRESSURE < 80 MM HG: ICD-10-PCS | Mod: CPTII,S$GLB,, | Performed by: STUDENT IN AN ORGANIZED HEALTH CARE EDUCATION/TRAINING PROGRAM

## 2022-08-01 PROCEDURE — 99999 PR PBB SHADOW E&M-EST. PATIENT-LVL V: CPT | Mod: PBBFAC,,, | Performed by: STUDENT IN AN ORGANIZED HEALTH CARE EDUCATION/TRAINING PROGRAM

## 2022-08-01 PROCEDURE — 3074F PR MOST RECENT SYSTOLIC BLOOD PRESSURE < 130 MM HG: ICD-10-PCS | Mod: CPTII,S$GLB,, | Performed by: STUDENT IN AN ORGANIZED HEALTH CARE EDUCATION/TRAINING PROGRAM

## 2022-08-01 PROCEDURE — 1160F RVW MEDS BY RX/DR IN RCRD: CPT | Mod: CPTII,S$GLB,, | Performed by: STUDENT IN AN ORGANIZED HEALTH CARE EDUCATION/TRAINING PROGRAM

## 2022-08-01 PROCEDURE — 1160F PR REVIEW ALL MEDS BY PRESCRIBER/CLIN PHARMACIST DOCUMENTED: ICD-10-PCS | Mod: CPTII,S$GLB,, | Performed by: STUDENT IN AN ORGANIZED HEALTH CARE EDUCATION/TRAINING PROGRAM

## 2022-08-01 PROCEDURE — 3074F SYST BP LT 130 MM HG: CPT | Mod: CPTII,S$GLB,, | Performed by: STUDENT IN AN ORGANIZED HEALTH CARE EDUCATION/TRAINING PROGRAM

## 2022-08-01 PROCEDURE — 99214 OFFICE O/P EST MOD 30 MIN: CPT | Mod: S$GLB,,, | Performed by: STUDENT IN AN ORGANIZED HEALTH CARE EDUCATION/TRAINING PROGRAM

## 2022-08-01 PROCEDURE — 3044F HG A1C LEVEL LT 7.0%: CPT | Mod: CPTII,S$GLB,, | Performed by: STUDENT IN AN ORGANIZED HEALTH CARE EDUCATION/TRAINING PROGRAM

## 2022-08-02 ENCOUNTER — PATIENT MESSAGE (OUTPATIENT)
Dept: PHARMACY | Facility: CLINIC | Age: 62
End: 2022-08-02
Payer: MEDICARE

## 2022-08-02 NOTE — PROGRESS NOTES
Subjective:       Patient ID: Joyce Peterson is a 61 y.o. female.    Chief Complaint: Follow-up (Pt here for a 6 month follow up )    Mild intermittent asthma without complication  controlled  Follows with pulm    Hyperlipidemia  controled   Crestor 20   Well tolerated     Urge urinary incontinence  Follows with urology       Rheumatoid arthritis involving multiple sites  Follows with rheum every 6 months   Controlled  Trying to wean off steroids but dealing with hip issue right now that is causing severe pain    Sjogren's syndrome  Follows with rheum    Elevated serum GGT level  Yearly with hepatology     Steroid-induced osteoporosis  Managed by endo      Review of Systems   Constitutional: Positive for activity change and fatigue. Negative for fever.   HENT: Negative.    Respiratory: Negative for cough, shortness of breath and wheezing.    Cardiovascular: Negative for chest pain and leg swelling.   Gastrointestinal: Negative for abdominal pain, diarrhea, nausea and vomiting.   Genitourinary: Negative.  Negative for difficulty urinating, dysuria and frequency.   Musculoskeletal: Positive for arthralgias, gait problem, joint swelling and myalgias.   Neurological: Positive for weakness. Negative for dizziness, numbness and headaches.   Psychiatric/Behavioral: Positive for dysphoric mood and sleep disturbance. The patient is nervous/anxious.       Objective:      Vitals:    08/01/22 1313   BP: 118/74   Pulse: 107   Temp: 97.7 °F (36.5 °C)      Physical Exam  Vitals reviewed.   Constitutional:       Appearance: Normal appearance. She is obese.   HENT:      Head: Normocephalic and atraumatic.   Eyes:      Conjunctiva/sclera: Conjunctivae normal.   Cardiovascular:      Rate and Rhythm: Normal rate and regular rhythm.      Heart sounds: Normal heart sounds.   Pulmonary:      Effort: Pulmonary effort is normal.      Breath sounds: Normal breath sounds.   Abdominal:      Palpations: Abdomen is soft.      Tenderness:  There is no abdominal tenderness.   Musculoskeletal:         General: Deformity present. Normal range of motion.      Cervical back: Normal range of motion.      Right lower leg: No edema.      Left lower leg: No edema.   Neurological:      General: No focal deficit present.      Mental Status: She is alert and oriented to person, place, and time.   Psychiatric:         Mood and Affect: Mood normal.         Behavior: Behavior normal.          Assessment:       1. Mixed hyperlipidemia    2. Mild intermittent asthma without complication    3. Urge urinary incontinence    4. Rheumatoid arthritis involving multiple sites with positive rheumatoid factor    5. Elevated serum GGT level    6. Steroid-induced osteoporosis    7. Left hip pain        Plan:   1. Mixed hyperlipidemia    2. Mild intermittent asthma without complication    3. Urge urinary incontinence    4. Rheumatoid arthritis involving multiple sites with positive rheumatoid factor    5. Elevated serum GGT level    6. Steroid-induced osteoporosis    7. Left hip pain       Labs reviewed; ordered by rheum regularly   Continue healthy lifestyle efforts  Continue current meds as prescribed  Keep routine specialist f/u   RTC in 6 months and/or PRN         Krystal Singleton   [facility]   8/1/22

## 2022-08-02 NOTE — ASSESSMENT & PLAN NOTE
Follows with rheum every 6 months   Controlled  Trying to wean off steroids but dealing with hip issue right now that is causing severe pain

## 2022-08-03 ENCOUNTER — OFFICE VISIT (OUTPATIENT)
Dept: OPTOMETRY | Facility: CLINIC | Age: 62
End: 2022-08-03
Payer: MEDICARE

## 2022-08-03 DIAGNOSIS — H25.13 NUCLEAR SCLEROSIS OF BOTH EYES: ICD-10-CM

## 2022-08-03 DIAGNOSIS — H04.123 BILATERAL DRY EYES: Primary | ICD-10-CM

## 2022-08-03 DIAGNOSIS — Z13.5 SCREENING FOR EYE CONDITION: ICD-10-CM

## 2022-08-03 PROCEDURE — 99499 UNLISTED E&M SERVICE: CPT | Mod: S$GLB,,, | Performed by: OPTOMETRIST

## 2022-08-03 PROCEDURE — 99999 PR PBB SHADOW E&M-EST. PATIENT-LVL IV: ICD-10-PCS | Mod: PBBFAC,,, | Performed by: OPTOMETRIST

## 2022-08-03 PROCEDURE — 3044F PR MOST RECENT HEMOGLOBIN A1C LEVEL <7.0%: ICD-10-PCS | Mod: CPTII,S$GLB,, | Performed by: OPTOMETRIST

## 2022-08-03 PROCEDURE — 3044F HG A1C LEVEL LT 7.0%: CPT | Mod: CPTII,S$GLB,, | Performed by: OPTOMETRIST

## 2022-08-03 PROCEDURE — 99999 PR PBB SHADOW E&M-EST. PATIENT-LVL IV: CPT | Mod: PBBFAC,,, | Performed by: OPTOMETRIST

## 2022-08-03 PROCEDURE — 1159F MED LIST DOCD IN RCRD: CPT | Mod: CPTII,S$GLB,, | Performed by: OPTOMETRIST

## 2022-08-03 PROCEDURE — 99499 NO LOS: ICD-10-PCS | Mod: S$GLB,,, | Performed by: OPTOMETRIST

## 2022-08-03 PROCEDURE — 1159F PR MEDICATION LIST DOCUMENTED IN MEDICAL RECORD: ICD-10-PCS | Mod: CPTII,S$GLB,, | Performed by: OPTOMETRIST

## 2022-08-03 NOTE — PROGRESS NOTES
"HPI     Patient's age: 61 y.o. WF   Occupation: Disabled   Approximate date of last eye examination: 05/16/2022  Name of last eye doctor seen:    City/State: [facility]   Wears glasses? Yes      If yes, wears  Full-time or part-time?  full-time   Present g lasses are: Bifocal, SV Distance, SV Reading?  Progressive lens   Approximate age of present glasses:  2 months    Any problem with [facility] with glasses?  no     Wears CLs?:  Yes - part-time            If yes:               Type of CL worn:  1 Day Acuvue Moist                                             OD 8.5    +4.75 sphere                                              OS 8.5    +3.75sphere               Wears full-time or part-time:  Part time                Sleeps with contact lenses:  No                CL So lution used:                  How often replace CLs:  Dailies               Any problem with VA with CLs?  no                 Headaches?  No  Eye pain/discomfort?  dryness symptoms, blurry vision                                                                                     Flashes?  No   Floaters?  No   Diplopia/Double vision?  No   Patient's Ocular History:          Any eye surgeries? No          Any eye injury?  No          Any treatment for eye disease?  No   Family history of eye disea se?  No   Significant patient medical history:         1. Diabetes?  no      If yes, IDDM or NIDDM?   n/a               2. HBP?  No               3. Other (describe):   rheumatoid arthritis    ! OTC eyedrops currently using:  refresh during the day  , sy stane gel   at night      ! Prescription eye meds currently using:            Serum tears (Dr. Hardy)  QID OU  "every time I pass the   refrigerator"            Restasis OU BID            Refresh ATs - all day OU, and gel drops at bedtime            Xiidra BID OU (both Restasis and Xiidra, per Dr. Armas)    ! Any history of allergy/adverse reaction to any eye meds used   previously?  No      ! Any history of " allergy/adverse reaction to eyedrops used during prior   eye exam(s)? no    ! Any history of allergy/a dverse reaction to Novacaine or similar meds?   no    ! Any history of allergy/adverse reaction to Epinephrine or similar meds?   no    ! Patient okay with use of anesthetic eyedrops to check eye pressure?    yes        ! Patient okay with use of eyedrops to dilate pupils today?  yes     !  Allergies/Medications/Medical History/Family History reviewed today?    yes     Last edited by Micky Alatorre MA on 8/3/2022  9:07 AM. (History)            Assessment /Plan     For exam results, see Encounter Report.    1. Bilateral dry eyes     2. Nuclear sclerosis of both eyes     3. Screening for eye condition                    Bilateral dry eyes.     Using Restasis, and Xiidra, and autologous tears in both eyes.  Mrs. Peterson declines re-insertion of (medium-term dissolvable) punctal plugs, as she states that she was unhappy with the results when medium-term punctal plugs were previously inserted by Dr. Armas.     Early/trace nuclear sclerosis of lens of both eyes.    Refraction done at last visit.  Hyperopia with astigmatism and presbyopia bilaterally.  No change made to the last spectacle lens Rx issued.     Generally, good ocular health, otherwise.      Continue dry eye treatment as noted above.    Recheck in May, 2023 - or prior, if any problems noted in the interim

## 2022-08-03 NOTE — PATIENT INSTRUCTIONS
Bilateral dry eyes.     Using Restasis, and Xiidra, and autologous tears in both eyes.  Mrs. Peterson declines re-insertion of (medium-term dissolvable) punctal plugs, as she states that she was unhappy with the results when medium-term punctal plugs were previously inserted by Dr. Armas.     Early/trace nuclear sclerosis of lens of both eyes.    Refraction done at last visit.  Hyperopia with astigmatism and presbyopia bilaterally.  No change made to the last spectacle lens Rx issued.     Generally, good ocular health, otherwise.      Continue dry eye treatment as noted above.    Recheck in May, 2023 - or prior, if any problems noted in the interim

## 2022-08-04 ENCOUNTER — OFFICE VISIT (OUTPATIENT)
Dept: CARDIOLOGY | Facility: CLINIC | Age: 62
End: 2022-08-04
Payer: MEDICARE

## 2022-08-04 VITALS
HEIGHT: 61 IN | DIASTOLIC BLOOD PRESSURE: 60 MMHG | SYSTOLIC BLOOD PRESSURE: 120 MMHG | BODY MASS INDEX: 27.45 KG/M2 | HEART RATE: 105 BPM | OXYGEN SATURATION: 96 % | WEIGHT: 145.38 LBS

## 2022-08-04 DIAGNOSIS — K31.84 GASTROPARESIS: ICD-10-CM

## 2022-08-04 DIAGNOSIS — M05.79 RHEUMATOID ARTHRITIS INVOLVING MULTIPLE SITES WITH POSITIVE RHEUMATOID FACTOR: Chronic | ICD-10-CM

## 2022-08-04 DIAGNOSIS — R68.89 DECREASED STRENGTH, ENDURANCE, AND MOBILITY: ICD-10-CM

## 2022-08-04 DIAGNOSIS — I25.10 CORONARY ARTERY DISEASE INVOLVING NATIVE CORONARY ARTERY OF NATIVE HEART WITHOUT ANGINA PECTORIS: Primary | ICD-10-CM

## 2022-08-04 DIAGNOSIS — J45.20 MILD INTERMITTENT ASTHMA WITHOUT COMPLICATION: ICD-10-CM

## 2022-08-04 DIAGNOSIS — K21.9 GASTROESOPHAGEAL REFLUX DISEASE WITHOUT ESOPHAGITIS: ICD-10-CM

## 2022-08-04 DIAGNOSIS — R53.1 DECREASED STRENGTH, ENDURANCE, AND MOBILITY: ICD-10-CM

## 2022-08-04 DIAGNOSIS — R68.89 IMPAIRED TOLERANCE OF ACTIVITY: ICD-10-CM

## 2022-08-04 DIAGNOSIS — M35.00 SJOGREN'S SYNDROME, WITH UNSPECIFIED ORGAN INVOLVEMENT: ICD-10-CM

## 2022-08-04 DIAGNOSIS — Z74.09 DECREASED STRENGTH, ENDURANCE, AND MOBILITY: ICD-10-CM

## 2022-08-04 PROCEDURE — 3008F PR BODY MASS INDEX (BMI) DOCUMENTED: ICD-10-PCS | Mod: CPTII,S$GLB,, | Performed by: INTERNAL MEDICINE

## 2022-08-04 PROCEDURE — 3074F SYST BP LT 130 MM HG: CPT | Mod: CPTII,S$GLB,, | Performed by: INTERNAL MEDICINE

## 2022-08-04 PROCEDURE — 3074F PR MOST RECENT SYSTOLIC BLOOD PRESSURE < 130 MM HG: ICD-10-PCS | Mod: CPTII,S$GLB,, | Performed by: INTERNAL MEDICINE

## 2022-08-04 PROCEDURE — 99999 PR PBB SHADOW E&M-EST. PATIENT-LVL IV: CPT | Mod: PBBFAC,,, | Performed by: INTERNAL MEDICINE

## 2022-08-04 PROCEDURE — 3008F BODY MASS INDEX DOCD: CPT | Mod: CPTII,S$GLB,, | Performed by: INTERNAL MEDICINE

## 2022-08-04 PROCEDURE — 3078F DIAST BP <80 MM HG: CPT | Mod: CPTII,S$GLB,, | Performed by: INTERNAL MEDICINE

## 2022-08-04 PROCEDURE — 99213 OFFICE O/P EST LOW 20 MIN: CPT | Mod: S$GLB,,, | Performed by: INTERNAL MEDICINE

## 2022-08-04 PROCEDURE — 99213 PR OFFICE/OUTPT VISIT, EST, LEVL III, 20-29 MIN: ICD-10-PCS | Mod: S$GLB,,, | Performed by: INTERNAL MEDICINE

## 2022-08-04 PROCEDURE — 1159F MED LIST DOCD IN RCRD: CPT | Mod: CPTII,S$GLB,, | Performed by: INTERNAL MEDICINE

## 2022-08-04 PROCEDURE — 1159F PR MEDICATION LIST DOCUMENTED IN MEDICAL RECORD: ICD-10-PCS | Mod: CPTII,S$GLB,, | Performed by: INTERNAL MEDICINE

## 2022-08-04 PROCEDURE — 99999 PR PBB SHADOW E&M-EST. PATIENT-LVL IV: ICD-10-PCS | Mod: PBBFAC,,, | Performed by: INTERNAL MEDICINE

## 2022-08-04 PROCEDURE — 3044F HG A1C LEVEL LT 7.0%: CPT | Mod: CPTII,S$GLB,, | Performed by: INTERNAL MEDICINE

## 2022-08-04 PROCEDURE — 3078F PR MOST RECENT DIASTOLIC BLOOD PRESSURE < 80 MM HG: ICD-10-PCS | Mod: CPTII,S$GLB,, | Performed by: INTERNAL MEDICINE

## 2022-08-04 PROCEDURE — 3044F PR MOST RECENT HEMOGLOBIN A1C LEVEL <7.0%: ICD-10-PCS | Mod: CPTII,S$GLB,, | Performed by: INTERNAL MEDICINE

## 2022-08-04 NOTE — PROGRESS NOTES
Subjective:    Patient ID:  Joyce Peterson is a 61 y.o. female who presents for follow-up of CAD    HPI   The patient is a 61 year old female last seen 20 who had been under care of Dr Moran for Rheumtoid arthritis. Her bother had  Iof a heart attack at 58. Serial CACs were 123 in  and 668 in . A stress echo 20 was negative and EF 55%. She has osteoporosis related to steroids has had fracture of her left hip 3 times due to falls. She denies chest pain or MULLIGAN. She has been exercising and going it PT. EKG 22 was normal.  Lab Results   Component Value Date     (L) 2022    K 3.6 2022     2022    CO2 25 2022    BUN 15 2022    CREATININE 0.82 2022    GLU 97 2022    HGBA1C 5.2 03/15/2022    MG 2.2 2022    AST 34 2022    ALT 26 2022    ALBUMIN 3.9 2022    PROT 6.1 2022    BILITOT 0.7 2022    WBC 4.82 2022    HGB 13.4 2022    HCT 41.5 2022    MCV 94 2022     2022    INR 1.0 2015    TSH 1.200 2021         Lab Results   Component Value Date    CHOL 197 2021    HDL 83 (H) 2021    TRIG 156 (H) 2021       Lab Results   Component Value Date    LDLCALC 82.8 2021       Past Medical History:   Diagnosis Date    Acid reflux     Allergy     Anemia     Asthma     Coronary artery disease     CS (cervical spondylosis) 3/8/2013    Degenerative disc disease     Dry eyes     Dry mouth     Gastroparesis     History of methotrexate therapy 2022    Hyperlipidemia     Lateral meniscus derangement 2016    Lobular carcinoma in situ     Lumbar spondylosis 3/8/2013    Osteoarthritis     Osteoporosis     Rheumatoid arthritis(714.0)     Rupture of left triceps tendon 10/17/2018    Umbilical hernia 2015       Current Outpatient Medications:     albuterol (PROVENTIL/VENTOLIN HFA) 90 mcg/actuation inhaler, INHALE 2 PUFFS INTO  THE LUNGS EVERY 6 (SIX) HOURS AS NEEDED. RESCUE, Disp: 20.1 g, Rfl: 11    albuterol-ipratropium (DUO-NEB) 2.5 mg-0.5 mg/3 mL nebulizer solution, Take 3 mLs by nebulization every 6 (six) hours as needed for Wheezing. Rescue, Disp: 90 mL, Rfl: 3    ALPRAZolam (XANAX) 0.5 MG tablet, Take Once on call to procedure, Disp: 1 tablet, Rfl: 0    aspirin 81 mg Tab, Take 81 mg by mouth every morning. , Disp: , Rfl:     budesonide-formoterol 160-4.5 mcg (SYMBICORT) 160-4.5 mcg/actuation HFAA, INHALE 2 PUFFS INTO THE LUNGS EVERY 12 (TWELVE) HOURS., Disp: 30.6 g, Rfl: 3    calcium citrate-vitamin D3 315-200 mg (CITRACAL+D) 315 mg-5 mcg (200 unit) per tablet, Take 1 tablet by mouth 2 (two) times daily. , Disp: , Rfl:     cycloSPORINE (RESTASIS) 0.05 % ophthalmic emulsion, Instill one drop into both eyes two times per day, Disp: 60 each, Rfl: 10    diclofenac sodium (VOLTAREN) 1 % Gel, APPLY 2 GRAMS TOPICALLY 4 (FOUR) TIMES DAILY AS NEEDED., Disp: 300 g, Rfl: 2    estrogens, conjugated, (PREMARIN) 0.625 MG tablet, take 1 tablet by mouth daily, Disp: 90 tablet, Rfl: 4    fluconazole (DIFLUCAN) 100 MG tablet, Take 1 tablet by mouth once daily for 14 days., Disp: 14 tablet, Rfl: 0    halobetasol propion-tazarotene (DUOBRII) 0.01-0.045 % Lotn, aaa on feet and hands qhs  then vaseline and warm towel (Patient taking differently: aaa on feet and hands qhs  then vaseline and warm towel), Disp: 100 g, Rfl: 3    ketoconazole (NIZORAL) 2 % cream, Apply to feet twice daily for 3 weeks (Patient taking differently: Apply to feet twice daily for 3 weeks), Disp: 60 g, Rfl: 3    leflunomide (ARAVA) 20 MG Tab, Take 1 tablet (20 mg total) by mouth once daily., Disp: 90 tablet, Rfl: 0    lifitegrast (XIIDRA) 5 % Dpet, INSTILL ONE DROP INTO BOTH EYES TWICE A DAY (Patient taking differently: INSTILL ONE DROP INTO BOTH EYES TWICE A DAY), Disp: 60 mL, Rfl: 10    methenamine (HIPREX) 1 gram Tab, Take 1 tablet (1 g total) by mouth 2 (two)  times daily., Disp: 180 tablet, Rfl: 3    methocarbamoL (ROBAXIN) 500 MG Tab, TAKE 2 TABLETS (1,000 MG TOTAL) BY MOUTH 3 (THREE) TIMES DAILY AS NEEDED (MUSCLE PAIN)., Disp: 180 tablet, Rfl: 0    mirabegron (MYRBETRIQ) 25 mg Tb24 ER tablet, Take 1 tablet (25 mg total) by mouth once daily., Disp: 90 tablet, Rfl: 3    naproxen (NAPROSYN) 500 MG tablet, Take 1 tablet (500 mg total) by mouth 2 (two) times daily as needed., Disp: 180 tablet, Rfl: 0    omeprazole (PRILOSEC) 40 MG capsule, Take 1 capsule (40 mg total) by mouth every morning., Disp: 30 capsule, Rfl: 6    omeprazole-sodium bicarbonate (ZEGERID) 40-1.1 mg-gram per capsule, TAKE 1 CAPSULE BY MOUTH EVERY DAY IN THE MORNING, Disp: 90 capsule, Rfl: 3    POTASSIUM ORAL, Take by mouth once daily., Disp: , Rfl:     predniSONE (DELTASONE) 1 MG tablet, Take 5 tablets (5 mg total) by mouth once daily., Disp: 450 tablet, Rfl: 1    predniSONE (DELTASONE) 5 MG tablet, Take 1 tablet (5 mg total) by mouth once daily., Disp: 90 tablet, Rfl: 1    pregabalin (LYRICA) 50 MG capsule, Take 2 capsules (100 mg total) by mouth every evening., Disp: 180 capsule, Rfl: 1    PREMARIN vaginal cream, PLACE 0.5 GRAM VAGINALLY 3 (THREE) TIMES A WEEK., Disp: 30 g, Rfl: 1    progesterone (PROMETRIUM) 100 MG capsule, Take 1 capsule by mouth daily., Disp: 90 capsule, Rfl: 1    progesterone (PROMETRIUM) 100 MG capsule, take 1 capsule by mouth daily, Disp: 90 capsule, Rfl: 4    promethazine (PHENERGAN) 25 MG tablet, Take 1 tablet (25 mg total) by mouth every 6 (six) hours as needed for Nausea., Disp: 30 tablet, Rfl: 1    rosuvastatin (CRESTOR) 20 MG tablet, Take 1 tablet (20 mg total) by mouth every evening., Disp: 90 tablet, Rfl: 3    sarilumab (KEVZARA) 200 mg/1.14 mL Syrg, Inject 1.14 mL (200 mg total) into the skin every 14 (fourteen) days., Disp: 2.28 mL, Rfl: 2    sucralfate (CARAFATE) 1 gram tablet, Take 1 tablet (1 g total) by mouth 4 (four) times daily., Disp: 360 tablet,  "Rfl: 3    traMADoL (ULTRAM) 50 mg tablet, TAKE 1 TABLET (50 MG TOTAL) BY MOUTH EVERY 12 (TWELVE) HOURS AS NEEDED FOR PAIN., Disp: 60 tablet, Rfl: 1    montelukast (SINGULAIR) 10 mg tablet, TAKE 1 TABLET BY MOUTH EVERY DAY AT NIGHT, Disp: 90 tablet, Rfl: 3          Review of Systems   Constitutional: Negative for decreased appetite, diaphoresis, fever, malaise/fatigue, weight gain and weight loss.   HENT: Negative for congestion, ear discharge, ear pain and nosebleeds.    Eyes: Negative for blurred vision, double vision and visual disturbance.   Cardiovascular: Negative for chest pain, claudication, cyanosis, dyspnea on exertion, irregular heartbeat, leg swelling, near-syncope, orthopnea, palpitations, paroxysmal nocturnal dyspnea and syncope.   Respiratory: Negative for cough, hemoptysis, shortness of breath, sleep disturbances due to breathing, snoring, sputum production and wheezing.    Endocrine: Negative for polydipsia, polyphagia and polyuria.   Hematologic/Lymphatic: Negative for adenopathy and bleeding problem. Does not bruise/bleed easily.   Skin: Negative for color change, nail changes, poor wound healing and rash.   Musculoskeletal: Positive for joint pain. Negative for muscle cramps and muscle weakness. Joint swelling: left hip.   Gastrointestinal: Negative for abdominal pain, anorexia, change in bowel habit, hematochezia, nausea and vomiting.   Genitourinary: Negative for dysuria, frequency and hematuria.   Neurological: Negative for brief paralysis, difficulty with concentration, excessive daytime sleepiness, dizziness, focal weakness, headaches, light-headedness, seizures, vertigo and weakness.   Psychiatric/Behavioral: Negative for altered mental status and depression.   Allergic/Immunologic: Negative for persistent infections.        Objective:/60   Pulse 105   Ht 5' 1" (1.549 m)   Wt 65.9 kg (145 lb 6.3 oz)   LMP  (LMP Unknown)   SpO2 96%   BMI 27.47 kg/m²             Physical " Exam  Constitutional:       Appearance: She is well-developed and normal weight.   HENT:      Head: Normocephalic.      Right Ear: External ear normal.      Left Ear: External ear normal.      Nose: Nose normal.   Eyes:      General: No scleral icterus.     Conjunctiva/sclera: Conjunctivae normal.      Pupils: Pupils are equal, round, and reactive to light.   Neck:      Thyroid: No thyromegaly.      Vascular: No JVD.      Trachea: No tracheal deviation.   Cardiovascular:      Rate and Rhythm: Normal rate and regular rhythm.      Pulses: Intact distal pulses.           Carotid pulses are 2+ on the right side.       Dorsalis pedis pulses are 2+ on the right side and 2+ on the left side.        Posterior tibial pulses are 2+ on the right side and 2+ on the left side.      Heart sounds: Normal heart sounds. No murmur heard.    No friction rub. No gallop.   Pulmonary:      Effort: Pulmonary effort is normal. No respiratory distress.      Breath sounds: Normal breath sounds. No wheezing or rales.   Chest:      Chest wall: No tenderness.   Abdominal:      General: Bowel sounds are normal. There is no distension.      Palpations: Abdomen is soft.      Tenderness: There is no abdominal tenderness. There is no guarding.   Musculoskeletal:         General: No tenderness. Normal range of motion.      Cervical back: Normal range of motion.   Lymphadenopathy:      Comments: Palpation of lymph nodes of neck and groin normal   Skin:     General: Skin is warm and dry.      Coloration: Skin is not pale.      Findings: No erythema or rash.      Comments: Palpation of skin normal   Neurological:      Mental Status: She is alert and oriented to person, place, and time.      Cranial Nerves: No cranial nerve deficit.      Motor: No abnormal muscle tone.      Coordination: Coordination normal.   Psychiatric:         Behavior: Behavior normal.         Thought Content: Thought content normal.         Judgment: Judgment normal.            Assessment:       1. Coronary artery disease involving native coronary artery of native heart without angina pectoris    2. Mild intermittent asthma without complication    3. Rheumatoid arthritis involving multiple sites with positive rheumatoid factor    4. Sjogren's syndrome, with unspecified organ involvement    5. Gastroesophageal reflux disease without esophagitis    6. Gastroparesis    7. Impaired tolerance of activity    8. Decreased strength, endurance, and mobility         Plan:       Joyce was seen today for coronary artery disease.    Diagnoses and all orders for this visit:    Coronary artery disease involving native coronary artery of native heart without angina pectoris    Mild intermittent asthma without complication    Rheumatoid arthritis involving multiple sites with positive rheumatoid factor    Sjogren's syndrome, with unspecified organ involvement    Gastroesophageal reflux disease without esophagitis    Gastroparesis    Impaired tolerance of activity    Decreased strength, endurance, and mobility

## 2022-08-09 ENCOUNTER — OFFICE VISIT (OUTPATIENT)
Dept: ORTHOPEDICS | Facility: CLINIC | Age: 62
End: 2022-08-09
Payer: MEDICARE

## 2022-08-09 ENCOUNTER — HOSPITAL ENCOUNTER (OUTPATIENT)
Dept: RADIOLOGY | Facility: HOSPITAL | Age: 62
Discharge: HOME | End: 2022-08-09
Attending: ORTHOPAEDIC SURGERY
Payer: MEDICARE

## 2022-08-09 VITALS — BODY MASS INDEX: 27.94 KG/M2 | HEIGHT: 61 IN | WEIGHT: 148 LBS

## 2022-08-09 DIAGNOSIS — S72.25XD CLOSED NONDISPLACED SUBTROCHANTERIC FRACTURE OF LEFT FEMUR WITH ROUTINE HEALING: Primary | ICD-10-CM

## 2022-08-09 DIAGNOSIS — S72.145D CLOSED NONDISPLACED INTERTROCHANTERIC FRACTURE OF LEFT FEMUR WITH ROUTINE HEALING, SUBSEQUENT ENCOUNTER: ICD-10-CM

## 2022-08-09 PROCEDURE — 99999 PR PBB SHADOW E&M-EST. PATIENT-LVL IV: ICD-10-PCS | Mod: PBBFAC,,, | Performed by: ORTHOPAEDIC SURGERY

## 2022-08-09 PROCEDURE — 99213 PR OFFICE/OUTPT VISIT, EST, LEVL III, 20-29 MIN: ICD-10-PCS | Mod: S$GLB,,, | Performed by: ORTHOPAEDIC SURGERY

## 2022-08-09 PROCEDURE — 73552 X-RAY EXAM OF FEMUR 2/>: CPT | Mod: 26,LT,, | Performed by: RADIOLOGY

## 2022-08-09 PROCEDURE — 1160F RVW MEDS BY RX/DR IN RCRD: CPT | Mod: CPTII,S$GLB,, | Performed by: ORTHOPAEDIC SURGERY

## 2022-08-09 PROCEDURE — 3044F HG A1C LEVEL LT 7.0%: CPT | Mod: CPTII,S$GLB,, | Performed by: ORTHOPAEDIC SURGERY

## 2022-08-09 PROCEDURE — 1159F PR MEDICATION LIST DOCUMENTED IN MEDICAL RECORD: ICD-10-PCS | Mod: CPTII,S$GLB,, | Performed by: ORTHOPAEDIC SURGERY

## 2022-08-09 PROCEDURE — 1160F PR REVIEW ALL MEDS BY PRESCRIBER/CLIN PHARMACIST DOCUMENTED: ICD-10-PCS | Mod: CPTII,S$GLB,, | Performed by: ORTHOPAEDIC SURGERY

## 2022-08-09 PROCEDURE — 99213 OFFICE O/P EST LOW 20 MIN: CPT | Mod: S$GLB,,, | Performed by: ORTHOPAEDIC SURGERY

## 2022-08-09 PROCEDURE — 1159F MED LIST DOCD IN RCRD: CPT | Mod: CPTII,S$GLB,, | Performed by: ORTHOPAEDIC SURGERY

## 2022-08-09 PROCEDURE — 3044F PR MOST RECENT HEMOGLOBIN A1C LEVEL <7.0%: ICD-10-PCS | Mod: CPTII,S$GLB,, | Performed by: ORTHOPAEDIC SURGERY

## 2022-08-09 PROCEDURE — 99999 PR PBB SHADOW E&M-EST. PATIENT-LVL IV: CPT | Mod: PBBFAC,,, | Performed by: ORTHOPAEDIC SURGERY

## 2022-08-09 PROCEDURE — 73552 X-RAY EXAM OF FEMUR 2/>: CPT | Mod: TC,LT

## 2022-08-09 PROCEDURE — 73552 XR FEMUR 2 VIEW LEFT: ICD-10-PCS | Mod: 26,LT,, | Performed by: RADIOLOGY

## 2022-08-09 PROCEDURE — 3008F PR BODY MASS INDEX (BMI) DOCUMENTED: ICD-10-PCS | Mod: CPTII,S$GLB,, | Performed by: ORTHOPAEDIC SURGERY

## 2022-08-09 PROCEDURE — 3008F BODY MASS INDEX DOCD: CPT | Mod: CPTII,S$GLB,, | Performed by: ORTHOPAEDIC SURGERY

## 2022-08-09 NOTE — PROGRESS NOTES
HPI: 61-year-old female status post cephalomedullary nail fixation of nondisplaced left intertrochanteric femur fracture by me on 05/21/2019.  The patient has had several falls recently and has an upcoming surgery with the foot and ankle surgeons for foot issues.  Her last fall on 05/03/2022 brought her to the emergency department.  At that point they obtained x-rays of the left hip.  She is now following up with me today.  She has had pain around the area the left hip, most notably posteriorly in the buttocks and laterally.  This improved over time.    06/28/2022:  At last visit when I saw her on 05/22/2022 she had a visible nondisplaced fracture line in the subtrochanteric region of the left femur around her old nail.  I assured her that point time that the nail would have been the treatment for that fracture in the 1st place.  She has since had a CT scan ordered by another physician fully that area.  This confirms her fracture.  She has had some significant pain last 4 days in that area and the posterior buttock as before but now has pain in the anterior portion and around her hip flexors..  No new trauma.    08/09/2022:  Patient states that she is starting to get some relief from the RFA at L3,4 and 5.  She still does have some of the anterior thigh pain.  Less pain on the lateral side of the hip.  She is functioning better than she has in recent months.  She still does take Robaxin and occasional tramadol.    Past Medical History:   Diagnosis Date    Acid reflux     Allergy     Anemia     Asthma     Coronary artery disease     CS (cervical spondylosis) 3/8/2013    Degenerative disc disease     Dry eyes     Dry mouth     Gastroparesis     History of methotrexate therapy 1/19/2022    Hyperlipidemia     Lateral meniscus derangement 4/6/2016    Lobular carcinoma in situ     Lumbar spondylosis 3/8/2013    Osteoarthritis     Osteoporosis     Rheumatoid arthritis(714.0)     Rupture of left triceps  tendon 10/17/2018    Umbilical hernia 8/13/2015       Current Outpatient Medications on File Prior to Visit   Medication Sig Dispense Refill    albuterol (PROVENTIL/VENTOLIN HFA) 90 mcg/actuation inhaler INHALE 2 PUFFS INTO THE LUNGS EVERY 6 (SIX) HOURS AS NEEDED. RESCUE 20.1 g 11    albuterol-ipratropium (DUO-NEB) 2.5 mg-0.5 mg/3 mL nebulizer solution Take 3 mLs by nebulization every 6 (six) hours as needed for Wheezing. Rescue 90 mL 3    aspirin 81 mg Tab Take 81 mg by mouth every morning.       budesonide-formoterol 160-4.5 mcg (SYMBICORT) 160-4.5 mcg/actuation HFAA INHALE 2 PUFFS INTO THE LUNGS EVERY 12 (TWELVE) HOURS. 30.6 g 3    calcium citrate-vitamin D3 315-200 mg (CITRACAL+D) 315 mg-5 mcg (200 unit) per tablet Take 1 tablet by mouth 2 (two) times daily.       cycloSPORINE (RESTASIS) 0.05 % ophthalmic emulsion Instill one drop into both eyes two times per day 60 each 10    diclofenac sodium (VOLTAREN) 1 % Gel APPLY 2 GRAMS TOPICALLY 4 (FOUR) TIMES DAILY AS NEEDED. 300 g 2    erythromycin (ROMYCIN) ophthalmic ointment Place into the left eye 2 (two) times daily. Apply to affected external eye tissue 3.5 g 0    estrogens, conjugated, (PREMARIN) 0.625 MG tablet take 1 tablet by mouth daily 90 tablet 4    fluconazole (DIFLUCAN) 100 MG tablet Take 1 tablet by mouth once daily for 14 days. 14 tablet 0    halobetasol propion-tazarotene (DUOBRII) 0.01-0.045 % Lotn aaa on feet and hands qhs  then vaseline and warm towel 100 g 3    INV PROPULSID 10 MG Take 2 tablets (20 mg) by mouth 4 (four) times daily. FOR INVESTIGATIONAL USE ONLY. Protocol [id number]  Subject ID: [id number]. 1440 each 0    ketoconazole (NIZORAL) 2 % cream Apply to feet twice daily for 3 weeks (Patient taking differently: Apply to feet twice daily for 3 weeks) 60 g 3    leflunomide (ARAVA) 20 MG Tab TAKE 1 TABLET BY MOUTH EVERY DAY 90 tablet 0    lifitegrast (XIIDRA) 5 % Dpet INSTILL ONE DROP INTO BOTH EYES TWICE A DAY (Patient taking  differently: INSTILL ONE DROP INTO BOTH EYES TWICE A DAY) 60 mL 10    montelukast (SINGULAIR) 10 mg tablet TAKE 1 TABLET BY MOUTH EVERY DAY AT NIGHT 90 tablet 3    omeprazole (PRILOSEC) 40 MG capsule Take 1 capsule (40 mg total) by mouth every morning. 30 capsule 6    omeprazole-sodium bicarbonate (ZEGERID) 40-1.1 mg-gram per capsule TAKE 1 CAPSULE BY MOUTH EVERY DAY IN THE MORNING 90 capsule 3    POTASSIUM ORAL Take by mouth once daily.      predniSONE (DELTASONE) 5 MG tablet TAKE 1 TABLET BY MOUTH EVERY DAY 90 tablet 1    pregabalin (LYRICA) 50 MG capsule Take 1 capsule (50 mg total) by mouth every evening. 90 capsule 1    PREMARIN vaginal cream PLACE 0.5 GRAM VAGINALLY 3 (THREE) TIMES A WEEK. 30 g 1    progesterone (PROMETRIUM) 100 MG capsule Take 1 capsule by mouth daily. 90 capsule 1    progesterone (PROMETRIUM) 100 MG capsule take 1 capsule by mouth daily 90 capsule 4    promethazine (PHENERGAN) 25 MG tablet Take 1 tablet (25 mg total) by mouth every 6 (six) hours as needed for Nausea. 30 tablet 1    rosuvastatin (CRESTOR) 20 MG tablet Take 1 tablet (20 mg total) by mouth every evening. 90 tablet 3    sarilumab (KEVZARA) 200 mg/1.14 mL Syrg Inject 1.14 mL (200 mg total) into the skin every 14 (fourteen) days. 2.28 mL 2    sucralfate (CARAFATE) 1 gram tablet Take 1 tablet (1 g total) by mouth 4 (four) times daily. 360 tablet 3    traMADoL (ULTRAM) 50 mg tablet TAKE 1 TABLET (50 MG TOTAL) BY MOUTH EVERY 12 (TWELVE) HOURS AS NEEDED FOR PAIN. 60 tablet 0    [DISCONTINUED] naproxen (NAPROSYN) 500 MG tablet Take 1 tablet (500 mg total) by mouth 2 (two) times daily as needed. 180 tablet 0    mirabegron (MYRBETRIQ) 25 mg Tb24 ER tablet Take 1 tablet (25 mg total) by mouth once daily. 30 tablet 11     No current facility-administered medications on file prior to visit.         ROS:  Patient denies constitutional symptoms, cardiac symptoms, respiratory symptoms, GI symptoms.  The remainder of the  musculoskeletal ROS is included in the HPI.    PE:    AA&O x 4.  NAD  HEENT:  NCAT, sclera nonicteric  Lungs:  Respirations are equal and unlabored.  CV:  2+ bilateral upper and lower extremity pulses.  Skin:  Intact throughout.    MS:  Exam of the left lower extremity shows good range of motion of the hip.  No pain with log roll.  Some tenderness to palpation laterally and posterior in the buttocks.  No groin tenderness to palpation.  Neurovascular intact distally.  She is having no difficulty with hip extension which is different from our last visit.    Rads:  X-rays of the left femur performed today were reviewed and interpreted by me personally.  Nail is in place.  Small cortical defect filling in nicely.    A/P:  61-year-old female with prior cephalomedullary nail for intertrochanteric femur fracture and then subsequently with a subtrochanteric fracture sustained on 05/03/2022 which is now healing nicely.  She is starting to get more relief from the prior RFA that she had down.  She will continue activity as tolerated with no limitations.  At this point I think she has healed to the point where we do not need any more x-rays.  She will follow up on an as-needed basis.

## 2022-08-11 ENCOUNTER — SPECIALTY PHARMACY (OUTPATIENT)
Dept: PHARMACY | Facility: CLINIC | Age: 62
End: 2022-08-11
Payer: MEDICARE

## 2022-08-11 NOTE — TELEPHONE ENCOUNTER
Specialty Pharmacy - Refill Coordination    Specialty Medication Orders Linked to Encounter    Flowsheet Row Most Recent Value   Medication #1 sarilumab (KEVZARA) 200 mg/1.14 mL Syrg (Order#[id number], Rx#[id number])          Refill Questions - Documented Responses    Flowsheet Row Most Recent Value   Patient Availability and HIPAA Verification    Does patient want to proceed with activity? Yes   HIPAA/medical authority confirmed? Yes   Relationship to patient of person spoken to? Self   Refill Screening Questions    Changes to allergies? No   Changes to medications? No   New conditions since last clinic visit? No   Unplanned office visit, urgent care, ED, or hospital admission in the last 4 weeks? No   How does patient/caregiver feel medication is working? Good   Financial problems or insurance changes? No   How many doses of your specialty medications were missed in the last 4 weeks? 0   Would patient like to speak to a pharmacist? No   When does the patient need to receive the medication? 08/15/22   Refill Delivery Questions    How will the patient receive the medication? Delivery Jaymie   When does the patient need to receive the medication? 08/15/22   Shipping Address Home   Address in [facility] confirmed and updated if neccessary? Yes   Expected Copay ($) 55   Is the patient able to afford the medication copay? Yes   Payment Method CC on file   Days supply of Refill 28   Supplies needed? No supplies needed   Refill activity completed? Yes   Refill activity plan Refill scheduled   Shipment/Pickup Date: 08/11/22          Current Outpatient Medications   Medication Sig    albuterol (PROVENTIL/VENTOLIN HFA) 90 mcg/actuation inhaler INHALE 2 PUFFS INTO THE LUNGS EVERY 6 (SIX) HOURS AS NEEDED. RESCUE    albuterol-ipratropium (DUO-NEB) 2.5 mg-0.5 mg/3 mL nebulizer solution Take 3 mLs by nebulization every 6 (six) hours as needed for Wheezing. Rescue    ALPRAZolam (XANAX) 0.5 MG tablet Take Once on call to  procedure    aspirin 81 mg Tab Take 81 mg by mouth every morning.     budesonide-formoterol 160-4.5 mcg (SYMBICORT) 160-4.5 mcg/actuation HFAA INHALE 2 PUFFS INTO THE LUNGS EVERY 12 (TWELVE) HOURS.    calcium citrate-vitamin D3 315-200 mg (CITRACAL+D) 315 mg-5 mcg (200 unit) per tablet Take 1 tablet by mouth 2 (two) times daily.     cycloSPORINE (RESTASIS) 0.05 % ophthalmic emulsion Instill one drop into both eyes two times per day    diclofenac sodium (VOLTAREN) 1 % Gel APPLY 2 GRAMS TOPICALLY 4 (FOUR) TIMES DAILY AS NEEDED.    estrogens, conjugated, (PREMARIN) 0.625 MG tablet take 1 tablet by mouth daily    fluconazole (DIFLUCAN) 100 MG tablet Take 1 tablet by mouth once daily for 14 days.    halobetasol propion-tazarotene (DUOBRII) 0.01-0.045 % Lotn aaa on feet and hands qhs  then vaseline and warm towel (Patient taking differently: aaa on feet and hands qhs  then vaseline and warm towel)    ketoconazole (NIZORAL) 2 % cream Apply to feet twice daily for 3 weeks (Patient taking differently: Apply to feet twice daily for 3 weeks)    leflunomide (ARAVA) 20 MG Tab Take 1 tablet (20 mg total) by mouth once daily.    lifitegrast (XIIDRA) 5 % Dpet INSTILL ONE DROP INTO BOTH EYES TWICE A DAY (Patient taking differently: INSTILL ONE DROP INTO BOTH EYES TWICE A DAY)    methenamine (HIPREX) 1 gram Tab Take 1 tablet (1 g total) by mouth 2 (two) times daily.    methocarbamoL (ROBAXIN) 500 MG Tab TAKE 2 TABLETS (1,000 MG TOTAL) BY MOUTH 3 (THREE) TIMES DAILY AS NEEDED (MUSCLE PAIN).    mirabegron (MYRBETRIQ) 25 mg Tb24 ER tablet Take 1 tablet (25 mg total) by mouth once daily.    montelukast (SINGULAIR) 10 mg tablet TAKE 1 TABLET BY MOUTH EVERY DAY AT NIGHT    naproxen (NAPROSYN) 500 MG tablet Take 1 tablet (500 mg total) by mouth 2 (two) times daily as needed.    omeprazole (PRILOSEC) 40 MG capsule Take 1 capsule (40 mg total) by mouth every morning.    omeprazole-sodium bicarbonate (ZEGERID) 40-1.1 mg-gram  per capsule TAKE 1 CAPSULE BY MOUTH EVERY DAY IN THE MORNING    POTASSIUM ORAL Take by mouth once daily.    predniSONE (DELTASONE) 1 MG tablet Take 5 tablets (5 mg total) by mouth once daily.    predniSONE (DELTASONE) 5 MG tablet Take 1 tablet (5 mg total) by mouth once daily.    pregabalin (LYRICA) 50 MG capsule Take 2 capsules (100 mg total) by mouth every evening.    PREMARIN vaginal cream PLACE 0.5 GRAM VAGINALLY 3 (THREE) TIMES A WEEK.    progesterone (PROMETRIUM) 100 MG capsule Take 1 capsule by mouth daily.    progesterone (PROMETRIUM) 100 MG capsule take 1 capsule by mouth daily    promethazine (PHENERGAN) 25 MG tablet Take 1 tablet (25 mg total) by mouth every 6 (six) hours as needed for Nausea.    rosuvastatin (CRESTOR) 20 MG tablet Take 1 tablet (20 mg total) by mouth every evening.    sarilumab (KEVZARA) 200 mg/1.14 mL Syrg Inject 1.14 mL (200 mg total) into the skin every 14 (fourteen) days.    sucralfate (CARAFATE) 1 gram tablet Take 1 tablet (1 g total) by mouth 4 (four) times daily.    traMADoL (ULTRAM) 50 mg tablet TAKE 1 TABLET (50 MG TOTAL) BY MOUTH EVERY 12 (TWELVE) HOURS AS NEEDED FOR PAIN.   Last reviewed on 8/9/2022  1:25 PM by López Duff MD    Review of patient's allergies indicates:   Allergen Reactions    Actemra [tocilizumab] Other (See Comments)     Severe dizziness    Codeine Nausea And Vomiting and Hives    Gold au 198 Hives and Rash    Hydroxychloroquine Other (See Comments)     Can't remember the reaction      Iodinated contrast media Other (See Comments)     Other reaction(s): BURNING ALL OVER    Iodine Other (See Comments) and Hives     Other reaction(s): BURNING ALL OVER - Iodine dye - Not topical    Ondansetron Nausea And Vomiting    Sulfa (sulfonamide antibiotics) Other (See Comments)     Can't remember the reaction    Zofran [ondansetron hcl (pf)] Nausea And Vomiting     Pt reports that last time she received zofran she started vomiting again     "Methotrexate analogues Nausea Only    Pneumococcal vaccine Other (See Comments)    Pneumovax 23 [pneumococcal 23-argenis ps vaccine] Other (See Comments)     "sick"    Methotrexate Nausea Only    Last reviewed on  8/9/2022 8:22 AM by Chelsea Sosa      Tasks added this encounter   9/5/2022 - Refill Call (Auto Added)   Tasks due within next 3 months   No tasks due.     Diane Jones, Patient Care Assistant  [facility]  [address]  [facility] [address]  Phone: [phone]  Fax: [phone]      "

## 2022-08-15 ENCOUNTER — TELEPHONE (OUTPATIENT)
Dept: SPINE | Facility: CLINIC | Age: 62
End: 2022-08-15
Payer: MEDICARE

## 2022-08-15 NOTE — TELEPHONE ENCOUNTER
This message is for patient in regards to his/her appointment 08/16/22 at 08:30a   With Dr. Tj Mayfield MD.      [facility] Policy: For the safety of all patients and staff members.     Patient Visitor policy: During this visit we're informing all patients that face mask are required.     If you have any questions or concerns please contact [phone]      Pt verbalized understanding and has confirmed appt

## 2022-08-16 ENCOUNTER — OFFICE VISIT (OUTPATIENT)
Dept: PAIN MEDICINE | Facility: CLINIC | Age: 62
End: 2022-08-16
Attending: ANESTHESIOLOGY
Payer: MEDICARE

## 2022-08-16 ENCOUNTER — TELEPHONE (OUTPATIENT)
Dept: ADMINISTRATIVE | Facility: OTHER | Age: 62
End: 2022-08-16
Payer: MEDICARE

## 2022-08-16 VITALS
BODY MASS INDEX: 27.06 KG/M2 | HEART RATE: 97 BPM | DIASTOLIC BLOOD PRESSURE: 80 MMHG | SYSTOLIC BLOOD PRESSURE: 123 MMHG | TEMPERATURE: 99 F | HEIGHT: 61 IN | RESPIRATION RATE: 18 BRPM | WEIGHT: 143.31 LBS

## 2022-08-16 DIAGNOSIS — M47.9 OSTEOARTHRITIS OF SPINE, UNSPECIFIED SPINAL OSTEOARTHRITIS COMPLICATION STATUS, UNSPECIFIED SPINAL REGION: ICD-10-CM

## 2022-08-16 DIAGNOSIS — M54.16 LUMBAR RADICULOPATHY: Primary | ICD-10-CM

## 2022-08-16 PROCEDURE — 3074F SYST BP LT 130 MM HG: CPT | Mod: CPTII,S$GLB,, | Performed by: ANESTHESIOLOGY

## 2022-08-16 PROCEDURE — 1159F MED LIST DOCD IN RCRD: CPT | Mod: CPTII,S$GLB,, | Performed by: ANESTHESIOLOGY

## 2022-08-16 PROCEDURE — 3044F PR MOST RECENT HEMOGLOBIN A1C LEVEL <7.0%: ICD-10-PCS | Mod: CPTII,S$GLB,, | Performed by: ANESTHESIOLOGY

## 2022-08-16 PROCEDURE — 3044F HG A1C LEVEL LT 7.0%: CPT | Mod: CPTII,S$GLB,, | Performed by: ANESTHESIOLOGY

## 2022-08-16 PROCEDURE — 3079F DIAST BP 80-89 MM HG: CPT | Mod: CPTII,S$GLB,, | Performed by: ANESTHESIOLOGY

## 2022-08-16 PROCEDURE — 3008F PR BODY MASS INDEX (BMI) DOCUMENTED: ICD-10-PCS | Mod: CPTII,S$GLB,, | Performed by: ANESTHESIOLOGY

## 2022-08-16 PROCEDURE — 99214 OFFICE O/P EST MOD 30 MIN: CPT | Mod: 25,GC,S$GLB, | Performed by: ANESTHESIOLOGY

## 2022-08-16 PROCEDURE — 1160F PR REVIEW ALL MEDS BY PRESCRIBER/CLIN PHARMACIST DOCUMENTED: ICD-10-PCS | Mod: CPTII,S$GLB,, | Performed by: ANESTHESIOLOGY

## 2022-08-16 PROCEDURE — 3008F BODY MASS INDEX DOCD: CPT | Mod: CPTII,S$GLB,, | Performed by: ANESTHESIOLOGY

## 2022-08-16 PROCEDURE — 3079F PR MOST RECENT DIASTOLIC BLOOD PRESSURE 80-89 MM HG: ICD-10-PCS | Mod: CPTII,S$GLB,, | Performed by: ANESTHESIOLOGY

## 2022-08-16 PROCEDURE — 1160F RVW MEDS BY RX/DR IN RCRD: CPT | Mod: CPTII,S$GLB,, | Performed by: ANESTHESIOLOGY

## 2022-08-16 PROCEDURE — 99999 PR PBB SHADOW E&M-EST. PATIENT-LVL V: CPT | Mod: PBBFAC,,, | Performed by: ANESTHESIOLOGY

## 2022-08-16 PROCEDURE — 3074F PR MOST RECENT SYSTOLIC BLOOD PRESSURE < 130 MM HG: ICD-10-PCS | Mod: CPTII,S$GLB,, | Performed by: ANESTHESIOLOGY

## 2022-08-16 PROCEDURE — 99999 PR PBB SHADOW E&M-EST. PATIENT-LVL V: ICD-10-PCS | Mod: PBBFAC,,, | Performed by: ANESTHESIOLOGY

## 2022-08-16 PROCEDURE — 1159F PR MEDICATION LIST DOCUMENTED IN MEDICAL RECORD: ICD-10-PCS | Mod: CPTII,S$GLB,, | Performed by: ANESTHESIOLOGY

## 2022-08-16 PROCEDURE — 99214 PR OFFICE/OUTPT VISIT, EST, LEVL IV, 30-39 MIN: ICD-10-PCS | Mod: 25,GC,S$GLB, | Performed by: ANESTHESIOLOGY

## 2022-08-17 ENCOUNTER — TELEPHONE (OUTPATIENT)
Dept: ADMINISTRATIVE | Facility: OTHER | Age: 62
End: 2022-08-17
Payer: MEDICARE

## 2022-08-18 ENCOUNTER — PATIENT MESSAGE (OUTPATIENT)
Dept: PAIN MEDICINE | Facility: OTHER | Age: 62
End: 2022-08-18
Payer: MEDICARE

## 2022-08-18 DIAGNOSIS — M54.16 LUMBAR RADICULOPATHY: Primary | ICD-10-CM

## 2022-08-19 RX ORDER — LIFITEGRAST 50 MG/ML
SOLUTION/ DROPS OPHTHALMIC
Qty: 60 ML | Refills: 10 | Status: SHIPPED | OUTPATIENT
Start: 2022-08-19 | End: 2023-07-24 | Stop reason: SDUPTHER

## 2022-08-23 ENCOUNTER — TELEPHONE (OUTPATIENT)
Dept: OTOLARYNGOLOGY | Facility: CLINIC | Age: 62
End: 2022-08-23
Payer: MEDICARE

## 2022-08-23 ENCOUNTER — OFFICE VISIT (OUTPATIENT)
Dept: OTOLARYNGOLOGY | Facility: CLINIC | Age: 62
End: 2022-08-23
Payer: MEDICARE

## 2022-08-23 VITALS
WEIGHT: 143.19 LBS | HEART RATE: 104 BPM | BODY MASS INDEX: 27.06 KG/M2 | SYSTOLIC BLOOD PRESSURE: 132 MMHG | DIASTOLIC BLOOD PRESSURE: 84 MMHG

## 2022-08-23 DIAGNOSIS — J31.0 CHRONIC RHINITIS: Chronic | ICD-10-CM

## 2022-08-23 DIAGNOSIS — H61.91 LESION OF EXTERNAL EAR CANAL, RIGHT: Primary | ICD-10-CM

## 2022-08-23 DIAGNOSIS — H60.8X3 CHRONIC ECZEMATOUS OTITIS EXTERNA OF BOTH EARS: Chronic | ICD-10-CM

## 2022-08-23 DIAGNOSIS — J01.00 ACUTE NON-RECURRENT MAXILLARY SINUSITIS: ICD-10-CM

## 2022-08-23 PROCEDURE — 99214 PR OFFICE/OUTPT VISIT, EST, LEVL IV, 30-39 MIN: ICD-10-PCS | Mod: S$GLB,,, | Performed by: OTOLARYNGOLOGY

## 2022-08-23 PROCEDURE — 3044F PR MOST RECENT HEMOGLOBIN A1C LEVEL <7.0%: ICD-10-PCS | Mod: CPTII,S$GLB,, | Performed by: OTOLARYNGOLOGY

## 2022-08-23 PROCEDURE — 3079F DIAST BP 80-89 MM HG: CPT | Mod: CPTII,S$GLB,, | Performed by: OTOLARYNGOLOGY

## 2022-08-23 PROCEDURE — 3008F BODY MASS INDEX DOCD: CPT | Mod: CPTII,S$GLB,, | Performed by: OTOLARYNGOLOGY

## 2022-08-23 PROCEDURE — 3075F SYST BP GE 130 - 139MM HG: CPT | Mod: CPTII,S$GLB,, | Performed by: OTOLARYNGOLOGY

## 2022-08-23 PROCEDURE — 99214 OFFICE O/P EST MOD 30 MIN: CPT | Mod: S$GLB,,, | Performed by: OTOLARYNGOLOGY

## 2022-08-23 PROCEDURE — 99999 PR PBB SHADOW E&M-EST. PATIENT-LVL IV: CPT | Mod: PBBFAC,,, | Performed by: OTOLARYNGOLOGY

## 2022-08-23 PROCEDURE — 3008F PR BODY MASS INDEX (BMI) DOCUMENTED: ICD-10-PCS | Mod: CPTII,S$GLB,, | Performed by: OTOLARYNGOLOGY

## 2022-08-23 PROCEDURE — 1159F PR MEDICATION LIST DOCUMENTED IN MEDICAL RECORD: ICD-10-PCS | Mod: CPTII,S$GLB,, | Performed by: OTOLARYNGOLOGY

## 2022-08-23 PROCEDURE — 1159F MED LIST DOCD IN RCRD: CPT | Mod: CPTII,S$GLB,, | Performed by: OTOLARYNGOLOGY

## 2022-08-23 PROCEDURE — 3044F HG A1C LEVEL LT 7.0%: CPT | Mod: CPTII,S$GLB,, | Performed by: OTOLARYNGOLOGY

## 2022-08-23 PROCEDURE — 99999 PR PBB SHADOW E&M-EST. PATIENT-LVL IV: ICD-10-PCS | Mod: PBBFAC,,, | Performed by: OTOLARYNGOLOGY

## 2022-08-23 PROCEDURE — 3079F PR MOST RECENT DIASTOLIC BLOOD PRESSURE 80-89 MM HG: ICD-10-PCS | Mod: CPTII,S$GLB,, | Performed by: OTOLARYNGOLOGY

## 2022-08-23 PROCEDURE — 3075F PR MOST RECENT SYSTOLIC BLOOD PRESS GE 130-139MM HG: ICD-10-PCS | Mod: CPTII,S$GLB,, | Performed by: OTOLARYNGOLOGY

## 2022-08-23 RX ORDER — CIPROFLOXACIN AND DEXAMETHASONE 3; 1 MG/ML; MG/ML
4 SUSPENSION/ DROPS AURICULAR (OTIC) 2 TIMES DAILY
Qty: 7.5 ML | Refills: 0 | Status: SHIPPED | OUTPATIENT
Start: 2022-08-23 | End: 2022-09-02

## 2022-08-23 RX ORDER — CEFDINIR 300 MG/1
300 CAPSULE ORAL 2 TIMES DAILY
Qty: 20 CAPSULE | Refills: 0 | Status: SHIPPED | OUTPATIENT
Start: 2022-08-23 | End: 2022-09-02

## 2022-08-23 NOTE — PATIENT INSTRUCTIONS
Get Daniel Med Sinus Rinse from the pharmacy.  Use it daily according to the instructions.  It will help to cut down the inflammation and mucus/crusting in your nose and sinuses.     Stop qtips.  Use ciprodex drops in right ear twice a day.      CT scan of temporal bones ordered.      Follow up in 2-3 weeks- possible biopsy right ear of lesion if no improvement.

## 2022-08-23 NOTE — TELEPHONE ENCOUNTER
Returned call. Apt scheduled for today, at 2:20PM with Dr. Whaley, due to no availability with Dr. Dougherty. Pt thanked me and verbalized understanding.     ----- Message from Milan Flores sent at 8/23/2022  7:04 AM CDT -----  Type:  Same Day Appointment Request    Caller is requesting a same day appointment.  Caller declined first available appointment listed below.    Name of Caller:self  When is the first available appointment?9/9  Symptoms:right ear pain/ blood  Best Call Back Number:[phone]  Additional Information: none

## 2022-08-23 NOTE — PROGRESS NOTES
Chief Complaint   Patient presents with    Otalgia     Blood on right ear         HPI: Joyce Peterson is 61 y.o. female who isself-referred for evaluation of right ear pain.  Symptoms include right ear drainage  and right ear pain. Symptoms began a few weeks ago and are gradually improving since that time. Patient denies dyspnea, bilateral ear congestion, fever, sweats and weight loss. Ear history: 0 previous ear infections. She denies to hearing loss. she has bruxism or dental issues, and she wear something at night to prevent teeth grinding.    Patient has noticed some acute sinus issues and allergic rhinitis which has worsened in the last few days.  She has increased sinus pressure and facial pressure as well as some pain in the teeth.  She has had some purulent drainage from the nose.  She also reports some pain in the ear area as well as radiating down through the jaw over the last few weeks with this new issue.  The ear issues began a few weeks ago and upon using a Q-tip to clean the ear she noticed blood on the Q-tip.  She has had previous issues with wax impaction.      Past Medical History:   Diagnosis Date    Acid reflux     Allergy     Anemia     Asthma     Coronary artery disease     CS (cervical spondylosis) 3/8/2013    Degenerative disc disease     Dry eyes     Dry mouth     Gastroparesis     History of methotrexate therapy 1/19/2022    Hyperlipidemia     Lateral meniscus derangement 4/6/2016    Lobular carcinoma in situ     Lumbar spondylosis 3/8/2013    Osteoarthritis     Osteoporosis     Rheumatoid arthritis(714.0)     Rupture of left triceps tendon 10/17/2018    Umbilical hernia 8/13/2015     Social History     Socioeconomic History    Marital status:    Tobacco Use    Smoking status: Never Smoker    Smokeless tobacco: Never Used   Substance and Sexual Activity    Alcohol use: Yes     Comment: 2-3 times per year.    Drug use: No    Sexual activity: Yes      Partners: Male     Birth control/protection: Surgical     Social Determinants of Health     Financial Resource Strain: Low Risk     Difficulty of Paying Living Expenses: Not hard at all   Food Insecurity: No Food Insecurity    Worried About Running Out of Food in the Last Year: Never true    Ran Out of Food in the Last Year: Never true   Transportation Needs: No Transportation Needs    Lack of Transportation (Medical): No    Lack of Transportation (Non-Medical): No   Physical Activity: Unknown    Days of Exercise per Week: 2 days   Stress: No Stress Concern Present    Feeling of Stress : Not at all   Social Connections: Unknown    Frequency of Communication with Friends and Family: More than three times a week    Frequency of Social Gatherings with Friends and Family: Twice a week    Active Member of Clubs or Organizations: Yes    Attends Club or Organization Meetings: 1 to 4 times per year    Marital Status:    Housing Stability: Low Risk     Unable to Pay for Housing in the Last Year: No    Number of Places Lived in the Last Year: 1    Unstable Housing in the Last Year: No     Past Surgical History:   Procedure Laterality Date    BREAST BIOPSY Left 01/29/2002    core bx    CARPAL TUNNEL RELEASE Right 05/2017    CHOLECYSTECTOMY  2004    COLONOSCOPY      10/11    COLONOSCOPY N/A 6/29/2017    Procedure: COLONOSCOPY;  Surgeon: López Moore MD;  Location: [facility] ([address] [place] [address]);  Service: Endoscopy;  Laterality: N/A;    EPIDURAL STEROID INJECTION N/A 11/18/2021    Procedure: INJECTION, STEROID, EPIDURAL, L5-S1 IL NEED CONSENT;  Surgeon: Tj Mayfield MD;  Location: [facility];  Service: Pain Management;  Laterality: N/A;    INJECTION OF ANESTHETIC AGENT AROUND NERVE Bilateral 8/23/2021    Procedure: BLOCK, NERVE, MEDIAL BRANCH L3,L4,L5;  Surgeon: Tj Mayfield MD;  Location: [facility];  Service: Pain Management;  Laterality: Bilateral;  1 of 2    INJECTION OF ANESTHETIC AGENT AROUND  NERVE Bilateral 8/26/2021    Procedure: BLOCK, NERVE, MEDIAL BRANCH L3,L4,L5;  Surgeon: Tj Mayfield MD;  Location: [facility];  Service: Pain Management;  Laterality: Bilateral;  2 of 2    INJECTION OF ANESTHETIC AGENT AROUND NERVE Left 7/7/2022    Procedure: BLOCK, NERVE, LEFT OBTURATOR AND FEMORAL;  Surgeon: Tj Mayfield MD;  Location: [facility];  Service: Pain Management;  Laterality: Left;    INJECTION OF FACET JOINT Bilateral 3/12/2020    Procedure: FACET JOINT INJECTION (LUMBAR BLOCK) BILATERAL L4-5 AND L5-S1 DIRECT REFERRAL;  Surgeon: Tj Mayfield MD;  Location: [facility];  Service: Pain Management;  Laterality: Bilateral;  NEEDS CONSENT    INJECTION OF FACET JOINT Bilateral 3/29/2021    Procedure: INJECTION, FACET JOINT L3/4, L4/5, L5/S1;  Surgeon: Tj Mayfield MD;  Location: [facility];  Service: Pain Management;  Laterality: Bilateral;    INJECTION OF JOINT Right 7/30/2020    Procedure: INJECTION, JOINT, RIGHT HIP Interarticular under flouro;  Surgeon: Tj Mayfield MD;  Location: [facility];  Service: Pain Management;  Laterality: Right;  INJECTION, JOINT, RIGHT HIP Interarticular under flouro    INJECTION OF JOINT Right 2/21/2022    Procedure: Injection, Joint RIGHT ISCHIAL BURSA;  Surgeon: Tj Mayfield MD;  Location: [facility];  Service: Pain Management;  Laterality: Right;    INTRAMEDULLARY RODDING OF FEMUR Left 5/21/2019    Procedure: INSERTION, INTRAMEDULLARY ARIEL, FEMUR;  Surgeon: López Duff MD;  Location: [facility] OR 2ND FLR;  Service: Orthopedics;  Laterality: Left;    RADIOFREQUENCY ABLATION Left 9/20/2021    Procedure: RADIOFREQUENCY ABLATION left L3,4,5 RFA  1 of 2  CONSENT NEEDED;  Surgeon: Tj Mayfield MD;  Location: [facility];  Service: Pain Management;  Laterality: Left;    RADIOFREQUENCY ABLATION Right 10/4/2021    Procedure: RADIOFREQUENCY ABLATION  right L3,4,5 RFA   2 of 2  CONSENT NEEDED;  Surgeon: Tj Mayfield MD;  Location: [facility];  Service:  Pain Management;  Laterality: Right;    RADIOFREQUENCY ABLATION Left 4/21/2022    Procedure: Radiofrequency Ablation LEFT L3,L4,L5;  Surgeon: Tj Mayfield MD;  Location: [facility];  Service: Pain Management;  Laterality: Left;    RADIOFREQUENCY ABLATION Right 5/12/2022    Procedure: Radiofrequency Ablation RIGHT L3,L4,L5;  Surgeon: Tj Mayfield MD;  Location: [facility];  Service: Pain Management;  Laterality: Right;    RADIOFREQUENCY ABLATION Left 7/25/2022    Procedure: Radiofrequency Ablation Left Femoral & Obturator;  Surgeon: Tj Mayfield MD;  Location: [facility];  Service: Pain Management;  Laterality: Left;    REPAIR OF TRICEPS TENDON Left 10/17/2018    Procedure: REPAIR, TENDON, TRICEPS left elbow;  Surgeon: Staci Yarbrough MD;  Location: [facility] OR [address] [place] [address];  Service: Orthopedics;  Laterality: Left;  Anesthesia: General and regional. PRONE, k-wire , hand pan 1 and pan 2, CALL ARTHREX/Tamera notified 10-12 LO    TONSILLECTOMY      TRANSFORAMINAL EPIDURAL INJECTION OF STEROID Right 8/31/2020    Procedure: INJECTION, STEROID, EPIDURAL, TRANSFORAMINAL,  APPROACH, L3-L4 and L4-L5 need consent;  Surgeon: Tj Mayfield MD;  Location: [facility];  Service: Pain Management;  Laterality: Right;    TRANSFORAMINAL EPIDURAL INJECTION OF STEROID Bilateral 7/23/2021    Procedure: INJECTION, STEROID, EPIDURAL, TRANSFORAMINAL APPROACH L4/5;  Surgeon: Tj Mayfield MD;  Location: [facility];  Service: Pain Management;  Laterality: Bilateral;    TRIGGER POINT INJECTION N/A 7/23/2021    Procedure: INJECTION, TRIGGER POINT SCAPULAR;  Surgeon: Tj Mayfield MD;  Location: [facility];  Service: Pain Management;  Laterality: N/A;    TUBAL LIGATION  2003    UPPER GASTROINTESTINAL ENDOSCOPY      10/11    uterine ablation  2003     Family History   Problem Relation Age of Onset    Cancer Mother         Lung Cancer    Emphysema Mother     Heart attack Mother     Alcohol abuse Mother     Cancer  Father         Lung Cancer    Osteoarthritis Father     Lung cancer Father     Skin cancer Father     Alcohol abuse Father     Heart disease Brother     Heart attack Brother     Alcohol abuse Brother     Cirrhosis Brother     Osteoarthritis Paternal Aunt     Retinal detachment Maternal Aunt     No Known Problems Sister     No Known Problems Maternal Uncle     No Known Problems Paternal Uncle     No Known Problems Maternal Grandmother     No Known Problems Maternal Grandfather     No Known Problems Paternal Grandmother     No Known Problems Paternal Grandfather     Colon cancer Neg Hx     Esophageal cancer Neg Hx     Stomach cancer Neg Hx     Celiac disease Neg Hx     Diabetes Neg Hx     Thyroid disease Neg Hx     Amblyopia Neg Hx     Blindness Neg Hx     Cataracts Neg Hx     Glaucoma Neg Hx     Hypertension Neg Hx     Macular degeneration Neg Hx     Strabismus Neg Hx     Stroke Neg Hx            Review of Systems  General: negative for chills, fever or weight loss  Psychological: negative for mood changes or depression  Ophthalmic: negative for blurry vision, photophobia or eye pain  ENT: see HPI  Respiratory: no cough, shortness of breath, or wheezing  Cardiovascular: no chest pain or dyspnea on exertion  Gastrointestinal: no abdominal pain, change in bowel habits, or black/ bloody stools  Musculoskeletal: negative for gait disturbance or muscular weakness  Neurological: no syncope or seizures; no ataxia  Dermatological: negative for pruritis,  rash and jaundice  Hematologic/lymphatic: no easy bruising, no new adenopathy      Physical Exam:    Vitals:    08/23/22 1404   BP: 132/84   Pulse: 104       Constitutional: Well appearing / communicating without difficutly.  NAD.  Eyes: EOM I Bilaterally  Head/Face: Normocephalic.  Negative paranasal sinus pressure/tenderness.  Salivary glands WNL.  House Brackmann I Bilaterally.    Right Ear: Auricle normal appearance. External Auditory Canal  see below,TM w/o masses/lesions/perforations. TM mobility noted.       Ears:    Ears:    Small mass, approximately 3 mm, in lateral portion of EAC.  Heaped up, protruding from overlying skin.  Overlying dried blood.  Tender to palpation with examination.    Bilateral EACs with skin changes consistent with chronic otitis externa.      Left Ear: Auricle normal appearance. External Auditory Canal within normal limits no lesions or masses,TM w/o masses/lesions/perforations. TM mobility noted.  Nose: No gross nasal septal deviation. Inferior Turbinates 3+ bilaterally. No septal perforation. No masses/lesions. External nasal skin appears normal without masses/lesions.  Oral Cavity: Gingiva/lips within normal limits.  Dentition/gingiva healthy appearing. Mucus membranes moist. Floor of mouth soft, no masses palpated. Oral Tongue mobile. Hard Palate appears normal.    Oropharynx: Base of tongue appears normal. No masses/lesions noted. Tonsillar fossa/pharyngeal wall without lesions. Posterior oropharynx WNL.  Soft palate without masses. Midline uvula.   Neck/Lymphatic: No LAD I-VI bilaterally.  No thyromegaly.  No masses noted on exam.            Assessment:    ICD-10-CM ICD-9-CM    1. Lesion of external ear canal, right  H61.91 380.9 CT Temporal Bone without contrast   2. Chronic rhinitis  J31.0 472.0    3. Acute non-recurrent maxillary sinusitis  J01.00 461.0    4. Chronic eczematous otitis externa of both ears  H60.8X3 380.23      The primary encounter diagnosis was Lesion of external ear canal, right. Diagnoses of Chronic rhinitis, Acute non-recurrent maxillary sinusitis, and Chronic eczematous otitis externa of both ears were also pertinent to this visit.      Plan:  Orders Placed This Encounter   Procedures    CT Temporal Bone without contrast      Will order CT scan of the temporal bones to assess the depth of the lesion noted in the right EAC.    Patient will take a course of Omnicef to help with the acute  sinusitis issue she is experiencing, and she will do daily nasal saline rinses.    She will use Ciprodex in the right ear b.i.d..    She will return in 2-3 weeks for reexamination and possible biopsy of the EAC lesion.      Love Whaley MD

## 2022-08-24 ENCOUNTER — TELEPHONE (OUTPATIENT)
Dept: RHEUMATOLOGY | Facility: CLINIC | Age: 62
End: 2022-08-24
Payer: MEDICARE

## 2022-08-24 ENCOUNTER — PATIENT MESSAGE (OUTPATIENT)
Dept: RHEUMATOLOGY | Facility: CLINIC | Age: 62
End: 2022-08-24
Payer: MEDICARE

## 2022-08-24 RX ORDER — ACYCLOVIR 200 MG/1
200 CAPSULE ORAL
Qty: 35 CAPSULE | Refills: 1 | Status: SHIPPED | OUTPATIENT
Start: 2022-08-24 | End: 2022-10-11

## 2022-08-24 NOTE — TELEPHONE ENCOUNTER
Joyce, Kevzara itself doesn't cause ulcers but could cause reactivation of herpes in the mouth. Will send Rx for acyclovir 200mg to be taken 5x/day for 7 days. Would continue Magic Mouthwash as well. Hold the Kevzara until the ulcers heal. MEGA

## 2022-08-25 ENCOUNTER — PATIENT MESSAGE (OUTPATIENT)
Dept: OTOLARYNGOLOGY | Facility: CLINIC | Age: 62
End: 2022-08-25
Payer: MEDICARE

## 2022-08-25 ENCOUNTER — TELEPHONE (OUTPATIENT)
Dept: OTOLARYNGOLOGY | Facility: CLINIC | Age: 62
End: 2022-08-25
Payer: MEDICARE

## 2022-08-25 ENCOUNTER — TELEPHONE (OUTPATIENT)
Dept: SLEEP MEDICINE | Facility: CLINIC | Age: 62
End: 2022-08-25
Payer: MEDICARE

## 2022-08-25 ENCOUNTER — PATIENT MESSAGE (OUTPATIENT)
Dept: DERMATOLOGY | Facility: CLINIC | Age: 62
End: 2022-08-25
Payer: MEDICARE

## 2022-08-25 NOTE — TELEPHONE ENCOUNTER
Returned patient call in regards of her Ct scan concern. Patient stated that was an error from her in insurance, she was able to complete the CT scan today as planned. Patient asked to discard the message.  Patient verbalized understanding and thanked me for calling.     -  ---- Message from Krystal Singleton DO sent at 8/25/2022 12:20 PM CDT -----  Contact: pt    ----- Message -----  From: Diana Whaley NP  Sent: 8/25/2022   8:47 AM CDT  To: Krystal Singleton DO      ----- Message -----  From: Jeff Naidu  Sent: 8/25/2022   8:43 AM CDT  To: Jud[facility] Ortiz Staff    .Type:  Needs Medical Advice    Who Called: pt  Would the patient rather a call back or a response via [facility]? Call back  Best Call Back Number: [phone]  Additional Information: Pt.is calling regarding her Ct schedule for today and the insurance company is telling her that they do not have an  order for the test.

## 2022-08-25 NOTE — TELEPHONE ENCOUNTER
----- Message from Ramon Back sent at 8/25/2022  2:31 PM CDT -----  Contact: [phone]  Who Called: PT  Regarding:  CT scan   Would the patient rather a call back or a response via [facility]? Call back  Best Call Back Number: [phone]   Additional Information: n/a

## 2022-08-26 ENCOUNTER — TELEPHONE (OUTPATIENT)
Dept: OTOLARYNGOLOGY | Facility: CLINIC | Age: 62
End: 2022-08-26
Payer: MEDICARE

## 2022-08-26 ENCOUNTER — PATIENT MESSAGE (OUTPATIENT)
Dept: PAIN MEDICINE | Facility: OTHER | Age: 62
End: 2022-08-26
Payer: MEDICARE

## 2022-08-26 NOTE — TELEPHONE ENCOUNTER
----- Message from Karlpeterson Dudley sent at 8/26/2022  9:32 AM CDT -----  Contact: pt  Type: Requesting to speak with nurse        Who Called: PT  Regarding: results for CT SCAN. Please call as soon as possible today.   Would the patient rather a call back or a response via [facility]? Call back  Best Call Back Number: [phone]  Additional Information:

## 2022-08-29 ENCOUNTER — PATIENT MESSAGE (OUTPATIENT)
Dept: OTOLARYNGOLOGY | Facility: CLINIC | Age: 62
End: 2022-08-29
Payer: MEDICARE

## 2022-08-30 ENCOUNTER — TELEPHONE (OUTPATIENT)
Dept: SPINE | Facility: CLINIC | Age: 62
End: 2022-08-30
Payer: MEDICARE

## 2022-08-30 NOTE — TELEPHONE ENCOUNTER
----- Message from Quinn Alvarenga sent at 8/30/2022  9:33 AM CDT -----  Name of Who is Calling: TONNY GOMEZ [[id number]]            What is the request in detail: Patient is requesting call back to reschedule procedure being she is antibodies for ear infection              Can the clinic reply by [facility]: no              [facility] [phone]

## 2022-09-02 ENCOUNTER — PATIENT MESSAGE (OUTPATIENT)
Dept: PAIN MEDICINE | Facility: CLINIC | Age: 62
End: 2022-09-02
Payer: MEDICARE

## 2022-09-06 ENCOUNTER — SPECIALTY PHARMACY (OUTPATIENT)
Dept: PHARMACY | Facility: CLINIC | Age: 62
End: 2022-09-06
Payer: MEDICARE

## 2022-09-06 DIAGNOSIS — M06.9 RHEUMATOID ARTHRITIS, INVOLVING UNSPECIFIED SITE, UNSPECIFIED WHETHER RHEUMATOID FACTOR PRESENT: ICD-10-CM

## 2022-09-06 DIAGNOSIS — M05.79 RHEUMATOID ARTHRITIS INVOLVING MULTIPLE SITES WITH POSITIVE RHEUMATOID FACTOR: Primary | ICD-10-CM

## 2022-09-06 RX ORDER — SARILUMAB 200 MG/1.14ML
200 INJECTION, SOLUTION SUBCUTANEOUS
Qty: 2.28 ML | Refills: 2 | Status: SHIPPED | OUTPATIENT
Start: 2022-09-06 | End: 2022-10-06 | Stop reason: SDUPTHER

## 2022-09-06 NOTE — TELEPHONE ENCOUNTER
Spoke w pt regarding Kevzara refill. Refill request sent to [facility]. Pt is due 9/12. Will follow up once refills received.

## 2022-09-07 ENCOUNTER — PATIENT MESSAGE (OUTPATIENT)
Dept: PAIN MEDICINE | Facility: CLINIC | Age: 62
End: 2022-09-07
Payer: MEDICARE

## 2022-09-07 ENCOUNTER — PATIENT MESSAGE (OUTPATIENT)
Dept: ORTHOPEDICS | Facility: CLINIC | Age: 62
End: 2022-09-07
Payer: MEDICARE

## 2022-09-07 ENCOUNTER — TELEPHONE (OUTPATIENT)
Dept: PAIN MEDICINE | Facility: CLINIC | Age: 62
End: 2022-09-07
Payer: MEDICARE

## 2022-09-07 NOTE — TELEPHONE ENCOUNTER
Patient has a dose on hand for 9/12 injection. Pending call until 9/19 to schedule delivery for her 9/26 dose

## 2022-09-07 NOTE — TELEPHONE ENCOUNTER
----- Message from Jason Lew sent at 9/7/2022  4:35 PM CDT -----  Regarding: Results  Contact: TONNY GOMEZ [[id number]]  Name of Who is Calling: TONNY GOMEZ [[id number]]      What is the request in detail: Would like to speak with staff in regards to MRI results. Please advise      Can the clinic reply by [facility]: yes      What Number to Call Back if not in [facility]: [phone]

## 2022-09-08 ENCOUNTER — PATIENT MESSAGE (OUTPATIENT)
Dept: OTOLARYNGOLOGY | Facility: CLINIC | Age: 62
End: 2022-09-08
Payer: MEDICARE

## 2022-09-08 DIAGNOSIS — M17.11 OSTEOARTHRITIS OF RIGHT KNEE, UNSPECIFIED OSTEOARTHRITIS TYPE: Primary | ICD-10-CM

## 2022-09-08 NOTE — TELEPHONE ENCOUNTER
I will not be sedating her, just using local anesthetic, so she should be ok to drive.  But, if she feels more comfortable having someone with her, she's welcome to bring someone.     MARISOLB

## 2022-09-09 ENCOUNTER — PATIENT MESSAGE (OUTPATIENT)
Dept: OTOLARYNGOLOGY | Facility: CLINIC | Age: 62
End: 2022-09-09
Payer: MEDICARE

## 2022-09-12 ENCOUNTER — PATIENT MESSAGE (OUTPATIENT)
Dept: FAMILY MEDICINE | Facility: CLINIC | Age: 62
End: 2022-09-12
Payer: MEDICARE

## 2022-09-12 ENCOUNTER — TELEPHONE (OUTPATIENT)
Dept: FAMILY MEDICINE | Facility: CLINIC | Age: 62
End: 2022-09-12
Payer: MEDICARE

## 2022-09-12 RX ORDER — FLUCONAZOLE 100 MG/1
TABLET ORAL
Qty: 14 TABLET | Refills: 0 | Status: SHIPPED | OUTPATIENT
Start: 2022-09-12 | End: 2022-10-18 | Stop reason: SDUPTHER

## 2022-09-12 NOTE — TELEPHONE ENCOUNTER
----- Message from Erin Mckeon sent at 9/12/2022  7:06 AM CDT -----  Regarding: sameday  Contact: [phone]  Type:  Same Day Appointment Request    Caller is requesting a same day appointment.  Caller declined first available appointment listed below.    Name of Caller: self   When is the first available appointment?   Symptoms: Sores in her mouth, gums and tongue for 3 weeks   Best Call Back Number: [phone]  Additional Information:

## 2022-09-12 NOTE — TELEPHONE ENCOUNTER
Called and spoke with pt in regards of her message. Pt states that for 3 week she has had sores and pain in her mouth. Pt states that the pain has been so bad that she went to the dentist, but he found noting wrong with her at all. Pt informed me that  she has sores in her mouth and gums and tongue as well. Pt has an appt on 9/13/2022 for 3:20 pm tomorrow. Just sharing.

## 2022-09-13 ENCOUNTER — OFFICE VISIT (OUTPATIENT)
Dept: FAMILY MEDICINE | Facility: CLINIC | Age: 62
End: 2022-09-13
Payer: MEDICARE

## 2022-09-13 ENCOUNTER — OFFICE VISIT (OUTPATIENT)
Dept: OTOLARYNGOLOGY | Facility: CLINIC | Age: 62
End: 2022-09-13
Payer: MEDICARE

## 2022-09-13 VITALS
DIASTOLIC BLOOD PRESSURE: 84 MMHG | BODY MASS INDEX: 26.99 KG/M2 | HEART RATE: 108 BPM | SYSTOLIC BLOOD PRESSURE: 120 MMHG | OXYGEN SATURATION: 98 % | WEIGHT: 142.94 LBS | TEMPERATURE: 98 F | HEIGHT: 61 IN

## 2022-09-13 VITALS
SYSTOLIC BLOOD PRESSURE: 121 MMHG | BODY MASS INDEX: 26.93 KG/M2 | HEART RATE: 100 BPM | HEIGHT: 61 IN | DIASTOLIC BLOOD PRESSURE: 83 MMHG | WEIGHT: 142.63 LBS

## 2022-09-13 DIAGNOSIS — K14.0 GLOSSITIS: ICD-10-CM

## 2022-09-13 DIAGNOSIS — K12.0 ORAL APHTHAE: ICD-10-CM

## 2022-09-13 DIAGNOSIS — B37.0 ORAL PHARYNGEAL CANDIDIASIS: Primary | ICD-10-CM

## 2022-09-13 DIAGNOSIS — H61.91 LESION OF EXTERNAL EAR CANAL, RIGHT: Primary | ICD-10-CM

## 2022-09-13 PROCEDURE — 3079F PR MOST RECENT DIASTOLIC BLOOD PRESSURE 80-89 MM HG: ICD-10-PCS | Mod: CPTII,S$GLB,, | Performed by: OTOLARYNGOLOGY

## 2022-09-13 PROCEDURE — 3044F PR MOST RECENT HEMOGLOBIN A1C LEVEL <7.0%: ICD-10-PCS | Mod: CPTII,S$GLB,, | Performed by: OTOLARYNGOLOGY

## 2022-09-13 PROCEDURE — 99999 PR PBB SHADOW E&M-EST. PATIENT-LVL V: CPT | Mod: PBBFAC,,, | Performed by: STUDENT IN AN ORGANIZED HEALTH CARE EDUCATION/TRAINING PROGRAM

## 2022-09-13 PROCEDURE — 1160F PR REVIEW ALL MEDS BY PRESCRIBER/CLIN PHARMACIST DOCUMENTED: ICD-10-PCS | Mod: CPTII,S$GLB,, | Performed by: STUDENT IN AN ORGANIZED HEALTH CARE EDUCATION/TRAINING PROGRAM

## 2022-09-13 PROCEDURE — 1159F MED LIST DOCD IN RCRD: CPT | Mod: CPTII,S$GLB,, | Performed by: STUDENT IN AN ORGANIZED HEALTH CARE EDUCATION/TRAINING PROGRAM

## 2022-09-13 PROCEDURE — 3008F PR BODY MASS INDEX (BMI) DOCUMENTED: ICD-10-PCS | Mod: CPTII,S$GLB,, | Performed by: STUDENT IN AN ORGANIZED HEALTH CARE EDUCATION/TRAINING PROGRAM

## 2022-09-13 PROCEDURE — 3008F BODY MASS INDEX DOCD: CPT | Mod: CPTII,S$GLB,, | Performed by: STUDENT IN AN ORGANIZED HEALTH CARE EDUCATION/TRAINING PROGRAM

## 2022-09-13 PROCEDURE — 3074F PR MOST RECENT SYSTOLIC BLOOD PRESSURE < 130 MM HG: ICD-10-PCS | Mod: CPTII,S$GLB,, | Performed by: OTOLARYNGOLOGY

## 2022-09-13 PROCEDURE — 3074F SYST BP LT 130 MM HG: CPT | Mod: CPTII,S$GLB,, | Performed by: OTOLARYNGOLOGY

## 2022-09-13 PROCEDURE — 3044F HG A1C LEVEL LT 7.0%: CPT | Mod: CPTII,S$GLB,, | Performed by: STUDENT IN AN ORGANIZED HEALTH CARE EDUCATION/TRAINING PROGRAM

## 2022-09-13 PROCEDURE — 99999 PR PBB SHADOW E&M-EST. PATIENT-LVL V: CPT | Mod: PBBFAC,,, | Performed by: OTOLARYNGOLOGY

## 2022-09-13 PROCEDURE — 99214 OFFICE O/P EST MOD 30 MIN: CPT | Mod: S$GLB,,, | Performed by: STUDENT IN AN ORGANIZED HEALTH CARE EDUCATION/TRAINING PROGRAM

## 2022-09-13 PROCEDURE — 1160F RVW MEDS BY RX/DR IN RCRD: CPT | Mod: CPTII,S$GLB,, | Performed by: STUDENT IN AN ORGANIZED HEALTH CARE EDUCATION/TRAINING PROGRAM

## 2022-09-13 PROCEDURE — 3044F HG A1C LEVEL LT 7.0%: CPT | Mod: CPTII,S$GLB,, | Performed by: OTOLARYNGOLOGY

## 2022-09-13 PROCEDURE — 3044F PR MOST RECENT HEMOGLOBIN A1C LEVEL <7.0%: ICD-10-PCS | Mod: CPTII,S$GLB,, | Performed by: STUDENT IN AN ORGANIZED HEALTH CARE EDUCATION/TRAINING PROGRAM

## 2022-09-13 PROCEDURE — 3079F DIAST BP 80-89 MM HG: CPT | Mod: CPTII,S$GLB,, | Performed by: OTOLARYNGOLOGY

## 2022-09-13 PROCEDURE — 99214 PR OFFICE/OUTPT VISIT, EST, LEVL IV, 30-39 MIN: ICD-10-PCS | Mod: S$GLB,,, | Performed by: STUDENT IN AN ORGANIZED HEALTH CARE EDUCATION/TRAINING PROGRAM

## 2022-09-13 PROCEDURE — 3079F PR MOST RECENT DIASTOLIC BLOOD PRESSURE 80-89 MM HG: ICD-10-PCS | Mod: CPTII,S$GLB,, | Performed by: STUDENT IN AN ORGANIZED HEALTH CARE EDUCATION/TRAINING PROGRAM

## 2022-09-13 PROCEDURE — 3074F PR MOST RECENT SYSTOLIC BLOOD PRESSURE < 130 MM HG: ICD-10-PCS | Mod: CPTII,S$GLB,, | Performed by: STUDENT IN AN ORGANIZED HEALTH CARE EDUCATION/TRAINING PROGRAM

## 2022-09-13 PROCEDURE — 88305 TISSUE EXAM BY PATHOLOGIST: CPT | Mod: 26,,, | Performed by: PATHOLOGY

## 2022-09-13 PROCEDURE — 99999 PR PBB SHADOW E&M-EST. PATIENT-LVL V: ICD-10-PCS | Mod: PBBFAC,,, | Performed by: STUDENT IN AN ORGANIZED HEALTH CARE EDUCATION/TRAINING PROGRAM

## 2022-09-13 PROCEDURE — 99999 PR PBB SHADOW E&M-EST. PATIENT-LVL V: ICD-10-PCS | Mod: PBBFAC,,, | Performed by: OTOLARYNGOLOGY

## 2022-09-13 PROCEDURE — 3079F DIAST BP 80-89 MM HG: CPT | Mod: CPTII,S$GLB,, | Performed by: STUDENT IN AN ORGANIZED HEALTH CARE EDUCATION/TRAINING PROGRAM

## 2022-09-13 PROCEDURE — 92504 PR EAR MICROSCOPY EXAMINATION: ICD-10-PCS | Mod: S$GLB,,, | Performed by: OTOLARYNGOLOGY

## 2022-09-13 PROCEDURE — 88305 TISSUE EXAM BY PATHOLOGIST: CPT | Performed by: PATHOLOGY

## 2022-09-13 PROCEDURE — 92504 EAR MICROSCOPY EXAMINATION: CPT | Mod: S$GLB,,, | Performed by: OTOLARYNGOLOGY

## 2022-09-13 PROCEDURE — 3008F PR BODY MASS INDEX (BMI) DOCUMENTED: ICD-10-PCS | Mod: CPTII,S$GLB,, | Performed by: OTOLARYNGOLOGY

## 2022-09-13 PROCEDURE — 3074F SYST BP LT 130 MM HG: CPT | Mod: CPTII,S$GLB,, | Performed by: STUDENT IN AN ORGANIZED HEALTH CARE EDUCATION/TRAINING PROGRAM

## 2022-09-13 PROCEDURE — 99214 PR OFFICE/OUTPT VISIT, EST, LEVL IV, 30-39 MIN: ICD-10-PCS | Mod: 25,S$GLB,, | Performed by: OTOLARYNGOLOGY

## 2022-09-13 PROCEDURE — 69100 PR BIOPSY OF EXTERNAL EAR: ICD-10-PCS | Mod: S$GLB,,, | Performed by: OTOLARYNGOLOGY

## 2022-09-13 PROCEDURE — 69100 BIOPSY OF EXTERNAL EAR: CPT | Mod: S$GLB,,, | Performed by: OTOLARYNGOLOGY

## 2022-09-13 PROCEDURE — 1159F PR MEDICATION LIST DOCUMENTED IN MEDICAL RECORD: ICD-10-PCS | Mod: CPTII,S$GLB,, | Performed by: STUDENT IN AN ORGANIZED HEALTH CARE EDUCATION/TRAINING PROGRAM

## 2022-09-13 PROCEDURE — 99214 OFFICE O/P EST MOD 30 MIN: CPT | Mod: 25,S$GLB,, | Performed by: OTOLARYNGOLOGY

## 2022-09-13 PROCEDURE — 3008F BODY MASS INDEX DOCD: CPT | Mod: CPTII,S$GLB,, | Performed by: OTOLARYNGOLOGY

## 2022-09-13 PROCEDURE — 88305 TISSUE EXAM BY PATHOLOGIST: ICD-10-PCS | Mod: 26,,, | Performed by: PATHOLOGY

## 2022-09-13 PROCEDURE — 1159F MED LIST DOCD IN RCRD: CPT | Mod: CPTII,S$GLB,, | Performed by: OTOLARYNGOLOGY

## 2022-09-13 PROCEDURE — 1159F PR MEDICATION LIST DOCUMENTED IN MEDICAL RECORD: ICD-10-PCS | Mod: CPTII,S$GLB,, | Performed by: OTOLARYNGOLOGY

## 2022-09-13 RX ORDER — OFLOXACIN 3 MG/ML
3 SOLUTION AURICULAR (OTIC) 2 TIMES DAILY
Qty: 10 ML | Refills: 0 | Status: SHIPPED | OUTPATIENT
Start: 2022-09-13 | End: 2022-10-08

## 2022-09-13 RX ORDER — FLUCONAZOLE 40 MG/ML
200 POWDER, FOR SUSPENSION ORAL DAILY
Qty: 70 ML | Refills: 0 | Status: SHIPPED | OUTPATIENT
Start: 2022-09-13 | End: 2022-09-16

## 2022-09-13 RX ORDER — TRIAMCINOLONE ACETONIDE 1 MG/G
PASTE DENTAL 2 TIMES DAILY
Qty: 5 G | Refills: 3 | Status: SHIPPED | OUTPATIENT
Start: 2022-09-13 | End: 2023-09-06 | Stop reason: SDUPTHER

## 2022-09-13 RX ORDER — MUPIROCIN 20 MG/G
OINTMENT TOPICAL 2 TIMES DAILY
Qty: 22 G | Refills: 3 | Status: SHIPPED | OUTPATIENT
Start: 2022-09-13 | End: 2022-09-23

## 2022-09-13 NOTE — TELEPHONE ENCOUNTER
Pt reports she held Kevzara dose last Monday due to oral ulcers that began in August and still persists. One dose kevzara on hand stored in fridge. She notified rheum and Dr. Moran prescribed 8/24/22 course Acyclovir 200 mg 5 times/day X 7 days (dose appropriate for mucocutaneous HSV) and magic mouthwash prescribed by her dentist. She states it was getting better but then it came back when she completed Acyclovir and so got refilled 9/9/22 (per Up-to-date Acyclovir treatment 7-14 days). Pt also has Sjogrens and takes prednisone 3 mg daily. Pt reports she tried orajel OTC which did not help. Pt will also be seeing her PCP and an ENT today. Pt advised refill magic mouth wash, my be due to dry mouth from Sjogrens sip on fluids may need OTC saliva substitute xylimelts, continue to hold Kevzara and assess for therapy continuation after acylovir complete and when sores healed. Pt voiced understanding and agreed to follow-up call tomorrow.

## 2022-09-13 NOTE — PATIENT INSTRUCTIONS
Apply mupirocin and ear drops to right ear twice a day.    I will contact you with biopsy results once they are available.       Continue magic mouthwash.  Add Kenalog in orabase to affected areas as needed.      Reduce stress, get adequate sleep.    Ensure your  toothpaste is sodium lauryl sulfate free such as Matthew's organic toothpaste or original Crest toothpaste.  Start a multivitamin daily.  Add B vitamin, Vitamin D, Lysine, and Zinc supplements.  I would recommend avoiding any sharp foods, spicy foods, acidic foods and drinks, cinnamon, and mint products.   Ensure adequate hydration.      Kenalog paste, if prescribed, can be used as needed when ulcers arise.      If ulcers become frequent and more severe, we may consider further laboratory workup.

## 2022-09-13 NOTE — PROGRESS NOTES
Procedure:  Biopsy of skin lesion of right ear canal    Preprocedure Diagnosis:  right EAC skin lesion   Postprocedure Diagnosis:    Same  Findings:  soft tissue mass, inflammatory type tissue, biopsied from lateral EAC on right  Complications:  None  Blood loss:  Minimal    Procedure in detail:  After written consent was obtained, the area was cleansed with topical alcohol and topical viscous lidocaine anesthetic was used .  Approximately 2 cc of 1% lidocaine was infiltrated into the area of the lesion in the lateral EAC.    Operating microscope was used throughout the procedure for examination and performing biopsy.   Using an cupped forcep, the overlying desquamation and crusting was removed.  Two biopsied were taken used cupped forceps- tissue appeared inflammatory in nature and was erythematous/bleeding.  There was minimal bleeding of the area.  Bacitracin ointment was applied over the area after suctioning any drainage and topical lidocaine.    A cotton ball was placed in the EAC for any drainage or bleeding.      The patient tolerated the procedure well, and there were no complications.   Tylenol or ibuprofen may be used for any discomfort in the area, and ointment and ear drops will be used post-procedure.     Love Whaley MD  [facility]       Intact

## 2022-09-13 NOTE — PROGRESS NOTES
Chief Complaint   Patient presents with    Follow-up     Discuss biopsy procedure       HPI:  Patient is a 61 years old female who has previously seen me for EAC mass, right ear.   She returns today for reexamination and for possible biopsy of the small mass.       Since the last visit, the patient reports ear is unchanged.  She reports that over the last couple of weeks, she has had oral aphthae, in right gingival area and on right tongue.  She completed the antibiotics I gave her, and the dentist put her on magic mouthwash.  She also got acyclovir from her rhematologist, and discontinued her immunomodulatory medication.  They have improved but are still painful.      CT Scan 8/25/22: Results reviewed and discussed with the patient today.   FINDINGS:  Bilateral inner and middle ear structures are symmetric and normal.  Ossicles normal.  No evidence of scutum erosion bilaterally.  Tympanic membranes appear intact.     Minimal soft tissue density within the left external auditory canal near the tympanic membrane, presumed cerumen.  3 mm soft tissue protrusion at the posterior right EAC without definite underlying bony erosion.     Visualized brain and orbits unremarkable.  Paranasal sinuses and mastoid air cells clear.     Impression:     EAC findings as above.    Active Ambulatory Problems     Diagnosis Date Noted    Osteoarthritis involving multiple joints on both sides of body 08/01/2012    GERD (gastroesophageal reflux disease) 08/01/2012    Gastroparesis 08/07/2012    Steroid-induced osteoporosis 11/29/2012    Thyroid nodule 08/22/2013    Hyperlipidemia 08/22/2013    Mild intermittent asthma without complication 11/04/2015    Rheumatoid arthritis involving multiple sites 11/16/2016    Low back pain 02/07/2017    Iron deficiency anemia due to chronic blood loss 11/14/2017    Sjogren's syndrome 05/21/2018    Candidal esophagitis 10/15/2018    Coronary artery disease involving native coronary artery of native heart  without angina pectoris 07/16/2019    Subclinical hyperthyroidism 02/21/2020    Fibromyalgia 03/12/2020    Dry eyes     Ureterocele     Urge urinary incontinence     Elevated serum GGT level 03/17/2021    Transaminasemia 03/17/2021    Lumbar radiculopathy 07/23/2021    Stress fracture of left foot 06/28/2022    Arthritis of foot 06/28/2022    Pes planovalgus, acquired, left 06/28/2022    Degenerative joint disease of hindfoot, left 06/28/2022    Decreased strength, endurance, and mobility 07/05/2022    Impaired tolerance of activity 07/05/2022     Resolved Ambulatory Problems     Diagnosis Date Noted    Coronary artery disease 02/06/2013    Posterior tibial tendinitis of left leg 03/08/2013    Lumbar spondylosis 03/08/2013    CS (cervical spondylosis) 03/08/2013    Osteoarthritis of both knees 03/08/2013    Occipital neuralgia 08/13/2014    Osteoarthritis of CMC joint of thumb 11/13/2014    URTI (acute upper respiratory infection) 07/28/2015    Umbilical hernia 08/13/2015    CMC (carpometacarpal) synovitis 10/19/2015    AR (allergic rhinitis) 11/04/2015    Iron deficiency anemia 11/11/2015    Vomiting 11/23/2015    Viral gastroenteritis 11/23/2015    Sepsis 11/23/2015    SIRS due to infectious process with organ dysfunction 11/23/2015    Intractable vomiting with nausea 11/23/2015    Volume depletion, gastrointestinal loss 11/23/2015    Immunosuppressed status 03/03/2016    Lateral meniscus derangement 04/06/2016    Lateral epicondylitis of right elbow 08/10/2016    Pleurisy 11/01/2016    Thoracic back pain 02/07/2017    Carpal tunnel syndrome of right wrist 04/04/2017    Elevated parathyroid hormone 05/10/2017    Low TSH level 05/10/2017    Hypogammaglobulinemia 05/10/2017    Right carpal tunnel syndrome 05/29/2017    Iron deficiency anemia due to chronic blood loss 06/29/2017    Pain in right hand 06/30/2017    Pain in right elbow 06/30/2017    Range of motion deficit 06/30/2017    Decreased  strength of right  hand 06/30/2017    Right shoulder pain 08/21/2017    Pure hypercholesterolemia 01/08/2018    Research study patient 04/30/2018    Left elbow pain 06/20/2018    Rupture of left triceps tendon 10/17/2018    Pain in left elbow 11/01/2018    Stiffness of left elbow joint 01/10/2019    Weakness of left arm 01/10/2019    Left hip pain 03/06/2019    Acute hip pain, left 03/20/2019    Gait abnormality 03/20/2019    Greater trochanteric bursitis of left hip 05/13/2019    Stress fracture of neck of left femur 05/20/2019    Left leg weakness 06/06/2019    Impaired functional mobility, balance, gait, and endurance 06/13/2019    Buttock pain 09/27/2019    Difficulty walking 12/05/2019    ROBERT (iron deficiency anemia) 01/24/2020    Recurrent UTI     Atrophic vaginitis     Drug-induced constipation     Dry mouth     Nocturia     Urinary frequency     RADHA (stress urinary incontinence, female)     History of long term use of Methotrexate  03/17/2021    Chronic pain 03/29/2021    Acute pain of left shoulder 04/26/2021    Osteoarthritis of lumbar spine 08/23/2021    Decreased range of motion with decreased strength 10/05/2021    Impaired mobility and activities of daily living 10/05/2021    Decreased strength of lower extremity 10/08/2021    Healthcare maintenance 01/18/2022    Abnormal finding of blood chemistry, unspecified  01/19/2022    History of methotrexate therapy 01/19/2022     Past Medical History:   Diagnosis Date    Acid reflux     Allergy     Anemia     Asthma     Degenerative disc disease     Lobular carcinoma in situ     Osteoarthritis     Osteoporosis     Rheumatoid arthritis(714.0)        Review of Systems  General: negative for chills, fever or weight loss  Psychological: negative for mood changes or depression  Ophthalmic: negative for blurry vision, photophobia or eye pain  ENT: see HPI  Respiratory: no cough, shortness of breath, or wheezing  Cardiovascular: no chest pain or dyspnea on exertion  Gastrointestinal:  no abdominal pain, change in bowel habits, or black/ bloody stools  Musculoskeletal: negative for gait disturbance or muscular weakness  Neurological: no syncope or seizures; no ataxia  Dermatological: negative for pruritis,  rash and jaundice  Hematologic/lymphatic: no easy bruising, no new adenopathy    Physical Exam     Vitals:    09/13/22 1126   BP: 121/83   Pulse: 100       Constitutional: Well appearing / communicating.  NAD.  Eyes: EOM I Bilaterally  Head/Face: Normocephalic.  Negative paranasal sinus pressure/tenderness.  Salivary glands WNL.  House Brackmann I Bilaterally.    Right Ear: External Auditory Canal with small lesion 3mm, raised, in lateral 1/3 of EAC at about 7 o'clock position.  Slightly raised, but well circumscribed and covered in crust/dried skin.  TM w/o masses/lesions/perforations.  Auricle WNL.  Left Ear: External Auditory Canal WNL,TM w/o masses/lesions/perforations. Auricle WNL.  Nose: No gross nasal septal deviation. Inferior Turbinates 3+ bilaterally. No septal perforation. No masses/lesions. External nasal skin without masses/lesions.  Oral Cavity: Gingiva/lips WNL.  FOM Soft, no masses palpated. Oral Tongue mobile, area of glossitis/desquamtion of tongue and mild erythema on right lateral posterior 1/3.  Hard Palate WNL.  Three small aphthae noted, two in right lower GL sulcus, and one on oral surface of right lower gingiva.    Oropharynx: BOT WNL. No masses/lesions noted. Tonsillar fossa/pharyngeal wall without lesions. Posterior oropharynx WNL.  Soft palate without masses. Midline uvula.   Neck/Lymphatic: No LAD I-VI bilaterally.  No thyromegaly.  No masses noted on exam.    Mirror laryngoscopy/nasopharyngoscopy: Active gag reflex.  Unable to perform.    Neuro/Psychiatric: AOx3.  Normal mood and affect.   Cardiovascular: Normal carotid pulses bilaterally, no increasing jugular venous distention noted at cervical region bilaterally.    Respiratory: Normal respiratory effort, no  stridor, no retractions noted.    See separate procedure note for biopsy of right EAC skin lesion.     Assessment/Plan:    Joyce was seen today for follow-up.    Diagnoses and all orders for this visit:    Lesion of external ear canal, right    Oral aphthae    Glossitis    Other orders  -     triamcinolone acetonide 0.1% (KENALOG) 0.1 % paste; Place onto teeth 2 (two) times daily.  -     mupirocin (BACTROBAN) 2 % ointment; Apply topically 2 (two) times daily. Apply to ear as directed twice daily for 10 days  -     ofloxacin (FLOXIN) 0.3 % otic solution; Place 3 drops into both ears 2 (two) times daily. for 10 days    Continue magic mouthwash and kenalog in orabase for oral aphthae.  Continue acyclovir for 10 days total.  Gave recs for prevention of aphthae and glossitis.      Apply mupirocin and ofloxin drops to right EAC BID as directed.  Will follow up results of pathology and discuss with patient.      Follow up in 2-3 weeks.      Emily L. Burke, MD [facility]

## 2022-09-14 ENCOUNTER — PATIENT MESSAGE (OUTPATIENT)
Dept: FAMILY MEDICINE | Facility: CLINIC | Age: 62
End: 2022-09-14
Payer: MEDICARE

## 2022-09-14 NOTE — TELEPHONE ENCOUNTER
Patient wants to knoe can she take Fluconazole 40 mg/ml (DIFLUCAN) 40 mg/mL suspension 2 times a day instead of once daily. Please advise eladia.

## 2022-09-15 ENCOUNTER — PATIENT MESSAGE (OUTPATIENT)
Dept: ORTHOPEDICS | Facility: CLINIC | Age: 62
End: 2022-09-15
Payer: MEDICARE

## 2022-09-15 ENCOUNTER — TELEPHONE (OUTPATIENT)
Dept: ORTHOPEDICS | Facility: CLINIC | Age: 62
End: 2022-09-15
Payer: MEDICARE

## 2022-09-15 DIAGNOSIS — M84.375A STRESS FRACTURE OF LEFT FOOT, INITIAL ENCOUNTER: Primary | ICD-10-CM

## 2022-09-15 DIAGNOSIS — M25.511 RIGHT SHOULDER PAIN, UNSPECIFIED CHRONICITY: Primary | ICD-10-CM

## 2022-09-15 NOTE — TELEPHONE ENCOUNTER
Patient requesting to reschedule her surgery to 1/11/22 due to having a new grandby.     Scheduled CT scan and visit prior to surgery to discuss imaging and pre operative planning. Also rescheduled pre op appointment to sign consent.

## 2022-09-15 NOTE — TELEPHONE ENCOUNTER
Per PCP ov, pt has oral candidiasis and prescribed diflucan swish and swallow x 14 days. Pt reports its is already working. Pt to continue holding off Kevzara until diflucan complete and mouth sores resolved. Pt has 1 dose on hand, will follow up after 9/27/22

## 2022-09-16 ENCOUNTER — PATIENT MESSAGE (OUTPATIENT)
Dept: FAMILY MEDICINE | Facility: CLINIC | Age: 62
End: 2022-09-16
Payer: MEDICARE

## 2022-09-16 ENCOUNTER — PATIENT MESSAGE (OUTPATIENT)
Dept: OTOLARYNGOLOGY | Facility: CLINIC | Age: 62
End: 2022-09-16
Payer: MEDICARE

## 2022-09-16 LAB
FINAL PATHOLOGIC DIAGNOSIS: NORMAL
GROSS: NORMAL
Lab: NORMAL
MICROSCOPIC EXAM: NORMAL

## 2022-09-16 RX ORDER — ITRACONAZOLE 10 MG/ML
200 SOLUTION ORAL DAILY
Qty: 300 ML | Refills: 0 | Status: SHIPPED | OUTPATIENT
Start: 2022-09-16 | End: 2022-10-01

## 2022-09-16 NOTE — PROGRESS NOTES
Pathology results for right ear canal biopsy reviewed.    Pathology does not show any dysplasia or concern for malignancy.  Appears to be inflammatory.    Keep follow up appointment to reassess area and decide on further treatments needed.      Love Whaley MD  [facility]

## 2022-09-20 ENCOUNTER — OFFICE VISIT (OUTPATIENT)
Dept: SPORTS MEDICINE | Facility: CLINIC | Age: 62
End: 2022-09-20
Payer: MEDICARE

## 2022-09-20 ENCOUNTER — HOSPITAL ENCOUNTER (OUTPATIENT)
Dept: RADIOLOGY | Facility: HOSPITAL | Age: 62
Discharge: HOME | End: 2022-09-20
Attending: PHYSICIAN ASSISTANT
Payer: MEDICARE

## 2022-09-20 VITALS
HEART RATE: 89 BPM | BODY MASS INDEX: 27.21 KG/M2 | DIASTOLIC BLOOD PRESSURE: 69 MMHG | WEIGHT: 144 LBS | SYSTOLIC BLOOD PRESSURE: 115 MMHG

## 2022-09-20 DIAGNOSIS — M17.11 OSTEOARTHRITIS OF RIGHT KNEE, UNSPECIFIED OSTEOARTHRITIS TYPE: ICD-10-CM

## 2022-09-20 DIAGNOSIS — M06.9 RHEUMATOID ARTHRITIS INVOLVING RIGHT KNEE, UNSPECIFIED WHETHER RHEUMATOID FACTOR PRESENT: Primary | ICD-10-CM

## 2022-09-20 PROCEDURE — 73560 X-RAY EXAM OF KNEE 1 OR 2: CPT | Mod: TC,50

## 2022-09-20 PROCEDURE — 3008F BODY MASS INDEX DOCD: CPT | Mod: CPTII,S$GLB,, | Performed by: ORTHOPAEDIC SURGERY

## 2022-09-20 PROCEDURE — 1159F PR MEDICATION LIST DOCUMENTED IN MEDICAL RECORD: ICD-10-PCS | Mod: CPTII,S$GLB,, | Performed by: ORTHOPAEDIC SURGERY

## 2022-09-20 PROCEDURE — 20610 DRAIN/INJ JOINT/BURSA W/O US: CPT | Mod: RT,S$GLB,, | Performed by: ORTHOPAEDIC SURGERY

## 2022-09-20 PROCEDURE — 3044F PR MOST RECENT HEMOGLOBIN A1C LEVEL <7.0%: ICD-10-PCS | Mod: CPTII,S$GLB,, | Performed by: ORTHOPAEDIC SURGERY

## 2022-09-20 PROCEDURE — 99999 PR PBB SHADOW E&M-EST. PATIENT-LVL IV: ICD-10-PCS | Mod: PBBFAC,,, | Performed by: ORTHOPAEDIC SURGERY

## 2022-09-20 PROCEDURE — 3078F PR MOST RECENT DIASTOLIC BLOOD PRESSURE < 80 MM HG: ICD-10-PCS | Mod: CPTII,S$GLB,, | Performed by: ORTHOPAEDIC SURGERY

## 2022-09-20 PROCEDURE — 99213 OFFICE O/P EST LOW 20 MIN: CPT | Mod: 25,S$GLB,, | Performed by: ORTHOPAEDIC SURGERY

## 2022-09-20 PROCEDURE — 73560 X-RAY EXAM OF KNEE 1 OR 2: CPT | Mod: 26,50,, | Performed by: RADIOLOGY

## 2022-09-20 PROCEDURE — 20610 LARGE JOINT ASPIRATION/INJECTION: R KNEE: ICD-10-PCS | Mod: RT,S$GLB,, | Performed by: ORTHOPAEDIC SURGERY

## 2022-09-20 PROCEDURE — 3008F PR BODY MASS INDEX (BMI) DOCUMENTED: ICD-10-PCS | Mod: CPTII,S$GLB,, | Performed by: ORTHOPAEDIC SURGERY

## 2022-09-20 PROCEDURE — 3078F DIAST BP <80 MM HG: CPT | Mod: CPTII,S$GLB,, | Performed by: ORTHOPAEDIC SURGERY

## 2022-09-20 PROCEDURE — 1159F MED LIST DOCD IN RCRD: CPT | Mod: CPTII,S$GLB,, | Performed by: ORTHOPAEDIC SURGERY

## 2022-09-20 PROCEDURE — 99999 PR PBB SHADOW E&M-EST. PATIENT-LVL IV: CPT | Mod: PBBFAC,,, | Performed by: ORTHOPAEDIC SURGERY

## 2022-09-20 PROCEDURE — 3044F HG A1C LEVEL LT 7.0%: CPT | Mod: CPTII,S$GLB,, | Performed by: ORTHOPAEDIC SURGERY

## 2022-09-20 PROCEDURE — 73560 XR KNEE 1 OR 2 VIEW BILATERAL: ICD-10-PCS | Mod: 26,50,, | Performed by: RADIOLOGY

## 2022-09-20 PROCEDURE — 99213 PR OFFICE/OUTPT VISIT, EST, LEVL III, 20-29 MIN: ICD-10-PCS | Mod: 25,S$GLB,, | Performed by: ORTHOPAEDIC SURGERY

## 2022-09-20 PROCEDURE — 3074F SYST BP LT 130 MM HG: CPT | Mod: CPTII,S$GLB,, | Performed by: ORTHOPAEDIC SURGERY

## 2022-09-20 PROCEDURE — 3074F PR MOST RECENT SYSTOLIC BLOOD PRESSURE < 130 MM HG: ICD-10-PCS | Mod: CPTII,S$GLB,, | Performed by: ORTHOPAEDIC SURGERY

## 2022-09-20 RX ADMIN — TRIAMCINOLONE ACETONIDE 40 MG: 40 INJECTION, SUSPENSION INTRA-ARTICULAR; INTRAMUSCULAR at 02:09

## 2022-09-20 NOTE — PROGRESS NOTES
CC: Right knee pain    62 y.o. Female who presents as a new patient to me. Complaint is right knee pain x 6 months. She says she fell around that time but initially did not have pain until a few weeks after. Pain localizes mostly lateral but is generalized throughout the knee.  Endorses associated swelling or effusions. No prominent mechanical symptoms. Denies instability. Worse with prolonged ambulation or standing. Better with rest. Treatment thus far has included rest, activity modifications, oral medications. Here today to discuss diagnosis and treatment options.      Of note patient sees pain management regularly for her lower back and was referred by them after MRI showed medial and lateral meniscal tears.    PMHx notable for RA- currently on Kevzara, Arava, and 2mg Prednisone daily. Sees Dr. Isiah Moran.    Negative for tobacco.   Negative for diabetes.    Pain Score:   6    REVIEW OF SYSTEMS:   Constitution: Negative. Negative for chills, fever and night sweats.    Hematologic/Lymphatic: Negative for bleeding problem. Does not bruise/bleed easily.   Skin: Negative for dry skin, itching and rash.   Musculoskeletal: Negative for falls. Positive for right knee pain and muscle weakness.     All other review of symptoms were reviewed and found to be noncontributory.     PAST MEDICAL HISTORY:   Past Medical History:   Diagnosis Date    Acid reflux     Allergy     Anemia     Asthma     Coronary artery disease     CS (cervical spondylosis) 3/8/2013    Degenerative disc disease     Dry eyes     Dry mouth     Gastroparesis     History of methotrexate therapy 1/19/2022    Hyperlipidemia     Lateral meniscus derangement 4/6/2016    Lobular carcinoma in situ     Lumbar spondylosis 3/8/2013    Osteoarthritis     Osteoporosis     Rheumatoid arthritis(714.0)     Rupture of left triceps tendon 10/17/2018    Umbilical hernia 8/13/2015       PAST SURGICAL HISTORY:   Past Surgical History:   Procedure  Laterality Date    BREAST BIOPSY Left 01/29/2002    core bx    CARPAL TUNNEL RELEASE Right 05/2017    CHOLECYSTECTOMY  2004    COLONOSCOPY      10/11    COLONOSCOPY N/A 6/29/2017    Procedure: COLONOSCOPY;  Surgeon: López Moore MD;  Location: [facility] ENDO (4TH FLR);  Service: Endoscopy;  Laterality: N/A;    EPIDURAL STEROID INJECTION N/A 11/18/2021    Procedure: INJECTION, STEROID, EPIDURAL, L5-S1 IL NEED CONSENT;  Surgeon: Tj Mayfield MD;  [facility];  Service: Pain Management;  Laterality: N/A;    INJECTION OF ANESTHETIC AGENT AROUND NERVE Bilateral 8/23/2021    Procedure: BLOCK, NERVE, MEDIAL BRANCH L3,L4,L5;  Surgeon: Tj Mayfield MD;  [facility] [place] [facility];  Service: Pain Management;  Laterality: Bilateral;  1 of 2    INJECTION OF ANESTHETIC AGENT AROUND NERVE Bilateral 8/26/2021    Procedure: BLOCK, NERVE, MEDIAL BRANCH L3,L4,L5;  Surgeon: Tj Mayfield MD;  [facility]: [facility] [place] [facility];  Service: Pain Management;  Laterality: Bilateral;  2 of 2    INJECTION OF ANESTHETIC AGENT AROUND NERVE Left 7/7/2022    Procedure: BLOCK, NERVE, LEFT OBTURATOR AND FEMORAL;  Surgeon: Tj Mayfield MD;  [facility]: [facility] [place] [facility];  Service: Pain Management;  Laterality: Left;    INJECTION OF FACET JOINT Bilateral 3/12/2020    Procedure: FACET JOINT INJECTION (LUMBAR BLOCK) BILATERAL L4-5 AND L5-S1 DIRECT REFERRAL;  Surgeon: Tj Mayfield MD;  [facility]: [facility] [place] [facility];  Service: Pain Management;  Laterality: Bilateral;  NEEDS CONSENT    INJECTION OF FACET JOINT Bilateral 3/29/2021    Procedure: INJECTION, FACET JOINT L3/4, L4/5, L5/S1;  Surgeon: Tj Mayfield MD;  [facility] [place] [facility];  Service: Pain Management;  Laterality: Bilateral;    INJECTION OF JOINT Right 7/30/2020    Procedure: INJECTION, JOINT, RIGHT HIP Interarticular under flouro;  Surgeon: Tj Mayfield MD;  Location: [facility] PAIN MGT;  Service: Pain Management;  Laterality: Right;  INJECTION, JOINT, RIGHT HIP Interarticular under flouro     INJECTION OF JOINT Right 2/21/2022    Procedure: Injection, Joint RIGHT ISCHIAL BURSA;  Surgeon: Tj Mayfield MD;  Location: [facility] PAIN MGT;  Service: Pain Management;  Laterality: Right;    INTRAMEDULLARY RODDING OF FEMUR Left 5/21/2019    Procedure: INSERTION, INTRAMEDULLARY ARIEL, FEMUR;  Surgeon: López Duff MD;  Location: [facility] OR 2ND FLR;  Service: Orthopedics;  Laterality: Left;    RADIOFREQUENCY ABLATION Left 9/20/2021    Procedure: RADIOFREQUENCY ABLATION left L3,4,5 RFA  1 of 2  CONSENT NEEDED;  Surgeon: Tj Mayfield MD;  Location: [facility] PAIN MGT;  Service: Pain Management;  Laterality: Left;    RADIOFREQUENCY ABLATION Right 10/4/2021    Procedure: RADIOFREQUENCY ABLATION  right L3,4,5 RFA   2 of 2  CONSENT NEEDED;  Surgeon: Tj Mayfield MD;  Location: [facility] PAIN MGT;  Service: Pain Management;  Laterality: Right;    RADIOFREQUENCY ABLATION Left 4/21/2022    Procedure: Radiofrequency Ablation LEFT L3,L4,L5;  Surgeon: Tj Mayfield MD;  Location: [facility] PAIN MGT;  Service: Pain Management;  Laterality: Left;    RADIOFREQUENCY ABLATION Right 5/12/2022    Procedure: Radiofrequency Ablation RIGHT L3,L4,L5;  Surgeon: Tj Mayfield MD;  Location: [facility] PAIN MGT;  Service: Pain Management;  Laterality: Right;    RADIOFREQUENCY ABLATION Left 7/25/2022    Procedure: Radiofrequency Ablation Left Femoral & Obturator;  Surgeon: Tj Mayfield MD;  Location: [facility] PAIN MGT;  Service: Pain Management;  Laterality: Left;    REPAIR OF TRICEPS TENDON Left 10/17/2018    Procedure: REPAIR, TENDON, TRICEPS left elbow;  Surgeon: Staci Yarbrough MD;  Location: [facility] OR 1ST FLR;  Service: Orthopedics;  Laterality: Left;  Anesthesia: General and regional. PRONE, k-wire , hand pan 1 and pan 2, CALL ARTHREX/Tamera notified 10-12 LO    TONSILLECTOMY      TRANSFORAMINAL EPIDURAL INJECTION OF STEROID Right 8/31/2020    Procedure: INJECTION, STEROID, EPIDURAL, TRANSFORAMINAL,  APPROACH, L3-L4 and L4-L5 need consent;   Surgeon: Tj Mayfield MD;  Location: [facility] PAIN MGT;  Service: Pain Management;  Laterality: Right;    TRANSFORAMINAL EPIDURAL INJECTION OF STEROID Bilateral 7/23/2021    Procedure: INJECTION, STEROID, EPIDURAL, TRANSFORAMINAL APPROACH L4/5;  Surgeon: Tj Mayfield MD;  Location: [facility] PAIN MGT;  Service: Pain Management;  Laterality: Bilateral;    TRIGGER POINT INJECTION N/A 7/23/2021    Procedure: INJECTION, TRIGGER POINT SCAPULAR;  Surgeon: Tj Mayfield MD;  Location: [facility] PAIN MGT;  Service: Pain Management;  Laterality: N/A;    TUBAL LIGATION  2003    UPPER GASTROINTESTINAL ENDOSCOPY      10/11    uterine ablation  2003       FAMILY HISTORY:   Family History   Problem Relation Age of Onset    Cancer Mother         Lung Cancer    Emphysema Mother     Heart attack Mother     Alcohol abuse Mother     Cancer Father         Lung Cancer    Osteoarthritis Father     Lung cancer Father     Skin cancer Father     Alcohol abuse Father     Heart disease Brother     Heart attack Brother     Alcohol abuse Brother     Cirrhosis Brother     Osteoarthritis Paternal Aunt     Retinal detachment Maternal Aunt     No Known Problems Sister     No Known Problems Maternal Uncle     No Known Problems Paternal Uncle     No Known Problems Maternal Grandmother     No Known Problems Maternal Grandfather     No Known Problems Paternal Grandmother     No Known Problems Paternal Grandfather     Colon cancer Neg Hx     Esophageal cancer Neg Hx     Stomach cancer Neg Hx     Celiac disease Neg Hx     Diabetes Neg Hx     Thyroid disease Neg Hx     Amblyopia Neg Hx     Blindness Neg Hx     Cataracts Neg Hx     Glaucoma Neg Hx     Hypertension Neg Hx     Macular degeneration Neg Hx     Strabismus Neg Hx     Stroke Neg Hx        SOCIAL HISTORY:   Social History     Socioeconomic History    Marital status:    Tobacco Use    Smoking status: Never    Smokeless tobacco: Never   Substance and Sexual  Activity    Alcohol use: Yes     Comment: 2-3 times per year.    Drug use: No    Sexual activity: Yes     Partners: Male     Birth control/protection: Surgical     Social Determinants of Health     Financial Resource Strain: Low Risk     Difficulty of Paying Living Expenses: Not hard at all   Food Insecurity: No Food Insecurity    Worried About Running Out of Food in the Last Year: Never true    Ran Out of Food in the Last Year: Never true   Transportation Needs: No Transportation Needs    Lack of Transportation (Medical): No    Lack of Transportation (Non-Medical): No   Physical Activity: Unknown    Days of Exercise per Week: 2 days   Stress: No Stress Concern Present    Feeling of Stress : Not at all   Social Connections: Unknown    Frequency of Communication with Friends and Family: More than three times a week    Frequency of Social Gatherings with Friends and Family: Twice a week    Active Member of Clubs or Organizations: Yes    Attends Club or Organization Meetings: 1 to 4 times per year    Marital Status:    Housing Stability: Low Risk     Unable to Pay for Housing in the Last Year: No    Number of Places Lived in the Last Year: 1    Unstable Housing in the Last Year: No       MEDICATIONS:     Current Outpatient Medications:     acyclovir (ZOVIRAX) 200 MG capsule, Take 1 capsule (200 mg total) by mouth 5 (five) times daily., Disp: 35 capsule, Rfl: 1    albuterol (PROVENTIL/VENTOLIN HFA) 90 mcg/actuation inhaler, INHALE 2 PUFFS INTO THE LUNGS EVERY 6 (SIX) HOURS AS NEEDED. RESCUE, Disp: 20.1 g, Rfl: 11    albuterol-ipratropium (DUO-NEB) 2.5 mg-0.5 mg/3 mL nebulizer solution, Take 3 mLs by nebulization every 6 (six) hours as needed for Wheezing. Rescue, Disp: 90 mL, Rfl: 3    ALPRAZolam (XANAX) 0.5 MG tablet, Take Once on call to procedure, Disp: 1 tablet, Rfl: 0    aspirin 81 mg Tab, Take 81 mg by mouth every morning. , Disp: , Rfl:     budesonide-formoterol 160-4.5 mcg  (SYMBICORT) 160-4.5 mcg/actuation HFAA, INHALE 2 PUFFS INTO THE LUNGS EVERY 12 (TWELVE) HOURS., Disp: 30.6 g, Rfl: 3    calcium citrate-vitamin D3 315-200 mg (CITRACAL+D) 315 mg-5 mcg (200 unit) per tablet, Take 1 tablet by mouth 2 (two) times daily. , Disp: , Rfl:     cycloSPORINE (RESTASIS) 0.05 % ophthalmic emulsion, Instill one drop into both eyes two times per day, Disp: 60 each, Rfl: 10    diclofenac sodium (VOLTAREN) 1 % Gel, APPLY 2 GRAMS TOPICALLY 4 (FOUR) TIMES DAILY AS NEEDED., Disp: 300 g, Rfl: 2    estrogens, conjugated, (PREMARIN) 0.625 MG tablet, take 1 tablet by mouth daily, Disp: 90 tablet, Rfl: 4    fluconazole (DIFLUCAN) 100 MG tablet, Take 1 tablet by mouth once daily for 14 days., Disp: 14 tablet, Rfl: 0    halobetasol propion-tazarotene (DUOBRII) 0.01-0.045 % Lotn, aaa on feet and hands qhs  then vaseline and warm towel (Patient taking differently: aaa on feet and hands qhs  then vaseline and warm towel), Disp: 100 g, Rfl: 3    itraconazole (SPORANOX) 10 mg/mL Soln, Take 20 mLs (200 mg total) by mouth once daily for 14 days. Discard the remainder., Disp: 300 mL, Rfl: 0    ketoconazole (NIZORAL) 2 % cream, Apply to feet twice daily for 3 weeks (Patient taking differently: Apply to feet twice daily for 3 weeks), Disp: 60 g, Rfl: 3    leflunomide (ARAVA) 20 MG Tab, Take 1 tablet (20 mg total) by mouth once daily., Disp: 90 tablet, Rfl: 0    lifitegrast (XIIDRA) 5 % Dpet, INSTILL ONE DROP INTO BOTH EYES TWICE A DAY, Disp: 60 mL, Rfl: 10    magic mouthwash diphen/antac/lidoc, Swish and Spit 5 mL by mouth for 30 seconds twice daily., Disp: 150 mL, Rfl: 0    methenamine (HIPREX) 1 gram Tab, Take 1 tablet (1 g total) by mouth 2 (two) times daily., Disp: 180 tablet, Rfl: 3    mirabegron (MYRBETRIQ) 25 mg Tb24 ER tablet, Take 1 tablet (25 mg total) by mouth once daily., Disp: 90 tablet, Rfl: 3    montelukast (SINGULAIR) 10 mg tablet, TAKE 1 TABLET BY MOUTH EVERY DAY AT NIGHT, Disp: 90  tablet, Rfl: 3    naproxen (NAPROSYN) 500 MG tablet, Take 1 tablet (500 mg total) by mouth 2 (two) times daily as needed., Disp: 180 tablet, Rfl: 0    ofloxacin (FLOXIN) 0.3 % otic solution, Place 3 drops into both ears 2 (two) times daily for 10 days, Disp: 10 mL, Rfl: 0    omeprazole (PRILOSEC) 40 MG capsule, Take 1 capsule (40 mg total) by mouth every morning., Disp: 30 capsule, Rfl: 6    omeprazole-sodium bicarbonate (ZEGERID) 40-1.1 mg-gram per capsule, TAKE 1 CAPSULE BY MOUTH EVERY DAY IN THE MORNING, Disp: 90 capsule, Rfl: 3    POTASSIUM ORAL, Take by mouth once daily., Disp: , Rfl:     predniSONE (DELTASONE) 1 MG tablet, Take 5 tablets (5 mg total) by mouth once daily., Disp: 450 tablet, Rfl: 1    predniSONE (DELTASONE) 5 MG tablet, Take 1 tablet (5 mg total) by mouth once daily., Disp: 90 tablet, Rfl: 1    pregabalin (LYRICA) 50 MG capsule, Take 2 capsules (100 mg total) by mouth every evening., Disp: 180 capsule, Rfl: 1    PREMARIN vaginal cream, PLACE 0.5 GRAM VAGINALLY 3 (THREE) TIMES A WEEK., Disp: 30 g, Rfl: 1    progesterone (PROMETRIUM) 100 MG capsule, Take 1 capsule by mouth daily., Disp: 90 capsule, Rfl: 1    progesterone (PROMETRIUM) 100 MG capsule, take 1 capsule by mouth daily, Disp: 90 capsule, Rfl: 4    promethazine (PHENERGAN) 25 MG tablet, Take 1 tablet (25 mg total) by mouth every 6 (six) hours as needed for Nausea., Disp: 30 tablet, Rfl: 1    rosuvastatin (CRESTOR) 20 MG tablet, Take 1 tablet (20 mg total) by mouth every evening., Disp: 90 tablet, Rfl: 3    sarilumab (KEVZARA) 200 mg/1.14 mL Syrg, Inject 1.14 mL (200 mg total) into the skin every 14 (fourteen) days., Disp: 2.28 mL, Rfl: 2    sucralfate (CARAFATE) 1 gram tablet, TAKE 1 TABLET (1 G TOTAL) BY MOUTH 4 (FOUR) TIMES DAILY., Disp: 360 tablet, Rfl: 2    sucralfate (CARAFATE) 1 gram tablet, Take 1 tablet (1 g total) by mouth 4 (four) times daily., Disp: 360 tablet, Rfl: 3    traMADoL (ULTRAM) 50 mg tablet, TAKE 1  "TABLET (50 MG TOTAL) BY MOUTH EVERY 12 (TWELVE) HOURS AS NEEDED FOR PAIN., Disp: 60 tablet, Rfl: 1    triamcinolone acetonide 0.1% (KENALOG) 0.1 % paste, Place onto teeth 2 (two) times daily., Disp: 5 g, Rfl: 3    ALLERGIES:   Review of patient's allergies indicates:   Allergen Reactions    Actemra [tocilizumab] Other (See Comments)     Severe dizziness    Codeine Nausea And Vomiting and Hives    Gold au 198 Hives and Rash    Hydroxychloroquine Other (See Comments)     Can't remember the reaction      Iodinated contrast media Other (See Comments)     Other reaction(s): BURNING ALL OVER    Iodine Other (See Comments) and Hives     Other reaction(s): BURNING ALL OVER - Iodine dye - Not topical    Ondansetron Nausea And Vomiting    Sulfa (sulfonamide antibiotics) Other (See Comments)     Can't remember the reaction    Zofran [ondansetron hcl (pf)] Nausea And Vomiting     Pt reports that last time she received zofran she started vomiting again    Methotrexate analogues Nausea Only    Pneumococcal vaccine Other (See Comments)    Pneumovax 23 [pneumococcal 23-argenis ps vaccine] Other (See Comments)     "sick"    Methotrexate Nausea Only      PHYSICAL EXAMINATION:  /69   Pulse 89   Wt 65.3 kg (144 lb)   LMP  (LMP Unknown)   BMI 27.21 kg/m²   General: Well-developed well-nourished 62 y.o. femalein no acute distress   Cardiovascular: Regular rhythm by palpation of distal pulse, normal color and temperature, no concerning varicosities on symptomatic side   Lungs: No labored breathing or wheezing appreciated   Neuro: Alert and oriented ×3   Psychiatric: well oriented to person, place and time, demonstrates normal mood and affect   Skin: No rashes, lesions or ulcers, normal temperature, turgor, and texture on involved extremity    Ortho/SPM Exam  Examination of the right knee demonstrates intact extensor mechanism. Mild effusion or prepatellar swelling. Central patellar tracking. No patellar apprehension. " Normal patellar mobility. About 10 degrees from full extension. Flexion to 115. Pain with forced flexion or extension. Mild tenderness along the medial and lateral joint lines. Negative John's for any specific pain or mechanical symptoms. Negative Lachman. Stable to varus/valgus stress testing at 0 and 30 deg. Negative posterior drawer. Ligamentously stable.    IMAGING:  X-rays including standing, weight bearing AP and flexion bilateral knees, RIGHT knee lateral and sunrise views ordered and images reviewed by me show:    Tricompartmental degenerative arthritis of right knee.    MRI reviewed by me and discussed with patient. Study shows: Complete radial tear at the junction of the posterior horn/posterior root ligament of the medial meniscus with surrounding synovitis.  Complex tear anterior body/anterior horn lateral meniscus that closely approximates the anterior root ligament.  Tricompartmental osteoarthritis with mild chondral loss    ASSESSMENT:      ICD-10-CM ICD-9-CM   1. Rheumatoid arthritis involving right knee, unspecified whether rheumatoid factor present  M06.9 714.0       PLAN:     -Findings and treatment options were discussed with the patient  -We have discussed a variety of treatment options including medications, injections, physical therapy and other alternative treatments. Pt is requesting CSI and physical therapy at this time.    I made the decision to obtain old records of the patient including previous notes and imaging. I independently reviewed and interpreted lab results today as well as prior imaging.     1. Injection Procedure  A time out was performed, including verification of patient ID, procedure, site and side, availability of information and equipment, review of safety issues, and agreement with consent, the procedure site was marked.    After time out was performed, the patient was prepped aseptically with chloraprep swabsticks. A diagnostic and therapeutic injection of 1:4cc  Kenalog/Marcaine was given under sterile technique using a 22g x 1.5 needle from the Superolateral  aspect of the left Knee Joint in the supine position.      Joyce Peterson had no adverse reactions to the medication. Pain decreased. She was instructed to apply ice to the joint for 20 minutes and avoid strenuous activities for 24-36 hours following the injection. She was warned of possible blood sugar and/or blood pressure changes during that time. Following that time, she can resume regular activities.    She was reminded to call the clinic immediately for any adverse side effects as explained in clinic today.    1. Continue OTC antiinflammatories prn.  2. CSI R knee today in clinic.  3. Discuss with rheumatologist possible alterations in medical regiment for RA.  4. Ice compress to the affected area 2-3x a day for 15-20 minutes as needed for pain management.  5. RTC prn      Large Joint Aspiration/Injection: R knee    Date/Time: 9/20/2022 2:30 PM  Performed by: CHERI Burgess MD  Authorized by: CHERI Burgess MD     Consent Done?:  Yes (Verbal)  Indications:  Pain  Site marked: the procedure site was marked    Timeout: prior to procedure the correct patient, procedure, and site was verified    Prep: patient was prepped and draped in usual sterile fashion      Local anesthesia used?: Yes    Local anesthetic:  Co-phenylcaine spray (0.2% Naropin)  Anesthetic total (ml):  4      Details:  Needle Size:  22 G  Ultrasonic Guidance for needle placement?: No    Approach:  Lateral  Location:  Knee  Site:  R knee  Medications:  40 mg triamcinolone acetonide 40 mg/mL  Patient tolerance:  Patient tolerated the procedure well with no immediate complications

## 2022-09-27 NOTE — TELEPHONE ENCOUNTER
Pt reports mouth sores still present , she had to d/c nystatin mouth wash bc it was hurting her stomach. She states that due to joint pain onset from Kevzara hold she took her last injection last night with advisement from Dr. Moran.     Pt has no doses Kevzara on hand but has scheduled rheum appt 10/6/22 and agreed to call after rheum OV. Pt had no questions or concerns.

## 2022-09-29 ENCOUNTER — HOSPITAL ENCOUNTER (OUTPATIENT)
Facility: OTHER | Age: 62
Discharge: HOME | End: 2022-09-29
Attending: ANESTHESIOLOGY | Admitting: ANESTHESIOLOGY
Payer: MEDICARE

## 2022-09-29 VITALS
SYSTOLIC BLOOD PRESSURE: 104 MMHG | OXYGEN SATURATION: 98 % | RESPIRATION RATE: 16 BRPM | BODY MASS INDEX: 26.06 KG/M2 | TEMPERATURE: 98 F | HEIGHT: 61 IN | DIASTOLIC BLOOD PRESSURE: 69 MMHG | HEART RATE: 85 BPM | WEIGHT: 138 LBS

## 2022-09-29 DIAGNOSIS — G89.29 CHRONIC PAIN: ICD-10-CM

## 2022-09-29 DIAGNOSIS — M51.36 DDD (DEGENERATIVE DISC DISEASE), LUMBAR: ICD-10-CM

## 2022-09-29 DIAGNOSIS — M54.16 LUMBAR RADICULOPATHY: Primary | ICD-10-CM

## 2022-09-29 PROCEDURE — 25000003 PHARM REV CODE 250: Performed by: STUDENT IN AN ORGANIZED HEALTH CARE EDUCATION/TRAINING PROGRAM

## 2022-09-29 PROCEDURE — 25000003 PHARM REV CODE 250: Performed by: ANESTHESIOLOGY

## 2022-09-29 PROCEDURE — 63600175 PHARM REV CODE 636 W HCPCS: Performed by: ANESTHESIOLOGY

## 2022-09-29 PROCEDURE — 25500020 PHARM REV CODE 255: Performed by: ANESTHESIOLOGY

## 2022-09-29 PROCEDURE — 62323 NJX INTERLAMINAR LMBR/SAC: CPT | Mod: ,,, | Performed by: ANESTHESIOLOGY

## 2022-09-29 PROCEDURE — 62323 NJX INTERLAMINAR LMBR/SAC: CPT | Performed by: ANESTHESIOLOGY

## 2022-09-29 PROCEDURE — 62323 PR INJ LUMBAR/SACRAL, W/IMAGING GUIDANCE: ICD-10-PCS | Mod: ,,, | Performed by: ANESTHESIOLOGY

## 2022-09-29 RX ORDER — LIDOCAINE HYDROCHLORIDE 20 MG/ML
INJECTION, SOLUTION INFILTRATION; PERINEURAL
Status: DISCONTINUED | OUTPATIENT
Start: 2022-09-29 | End: 2022-09-29 | Stop reason: HOSPADM

## 2022-09-29 RX ORDER — LIDOCAINE HYDROCHLORIDE 10 MG/ML
INJECTION, SOLUTION EPIDURAL; INFILTRATION; INTRACAUDAL; PERINEURAL
Status: DISCONTINUED | OUTPATIENT
Start: 2022-09-29 | End: 2022-09-29 | Stop reason: HOSPADM

## 2022-09-29 RX ORDER — MIDAZOLAM HYDROCHLORIDE 1 MG/ML
INJECTION INTRAMUSCULAR; INTRAVENOUS
Status: DISCONTINUED | OUTPATIENT
Start: 2022-09-29 | End: 2022-09-29 | Stop reason: HOSPADM

## 2022-09-29 RX ORDER — TRIAMCINOLONE ACETONIDE 40 MG/ML
40 INJECTION, SUSPENSION INTRA-ARTICULAR; INTRAMUSCULAR
Status: DISCONTINUED | OUTPATIENT
Start: 2022-09-20 | End: 2022-09-29 | Stop reason: HOSPADM

## 2022-09-29 RX ORDER — DIPHENHYDRAMINE HYDROCHLORIDE 50 MG/ML
25 INJECTION INTRAMUSCULAR; INTRAVENOUS ONCE
Status: COMPLETED | OUTPATIENT
Start: 2022-09-29 | End: 2022-09-29

## 2022-09-29 RX ORDER — FENTANYL CITRATE 50 UG/ML
INJECTION, SOLUTION INTRAMUSCULAR; INTRAVENOUS
Status: DISCONTINUED | OUTPATIENT
Start: 2022-09-29 | End: 2022-09-29 | Stop reason: HOSPADM

## 2022-09-29 RX ORDER — DEXAMETHASONE SODIUM PHOSPHATE 10 MG/ML
INJECTION INTRAMUSCULAR; INTRAVENOUS
Status: DISCONTINUED | OUTPATIENT
Start: 2022-09-29 | End: 2022-09-29 | Stop reason: HOSPADM

## 2022-09-29 RX ORDER — SODIUM CHLORIDE 9 MG/ML
500 INJECTION, SOLUTION INTRAVENOUS CONTINUOUS
Status: ACTIVE | OUTPATIENT
Start: 2022-09-29 | End: 2022-09-30

## 2022-09-29 RX ADMIN — DIPHENHYDRAMINE HYDROCHLORIDE 25 MG: 50 INJECTION INTRAMUSCULAR; INTRAVENOUS at 11:09

## 2022-09-29 NOTE — OP NOTE
Lumbar Interlaminar Epidural Steroid Injection under Fluoroscopic Guidance    The procedure, risks, benefits, and options were discussed with the patient. There are no contraindications to the procedure. The patent expressed understanding and agreed to the procedure. Informed written consent was obtained prior to the start of the procedure and can be found in the patient's chart.    PATIENT NAME: Joyce Peterson   MRN: [id number]     DATE OF PROCEDURE: 09/29/2022    PROCEDURE: Lumbar Interlaminar Epidural Steroid Injection L3/L4 under Fluoroscopic Guidance    PRE-OP DIAGNOSIS: Lumbar radiculopathy [M54.16] Lumbar radiculopathy [M54.16]    POST-OP DIAGNOSIS: Same    PHYSICIAN: Tj Mayfield MD    ASSISTANTS:   Jose L Torres MD  [facility]      MEDICATIONS INJECTED: Preservative-free Decadron 10mg with 4cc of Lidocaine 1% MPF and preservative free normal saline    LOCAL ANESTHETIC INJECTED: Xylocaine 2%     SEDATION: Versed 2mg and Fentanyl 25mcg                                                                                                                                                                                     Conscious sedation ordered by M.D. Patient re-evaluation prior to administration of conscious sedation. No changes noted in patient's status from initial evaluation. The patient's vital signs were monitored by RN and patient remained hemodynamically stable throughout the procedure.    Event Time In   Sedation Start 1204   Sedation End 1207       ESTIMATED BLOOD LOSS: None    COMPLICATIONS: None    TECHNIQUE: Time-out was performed to identify the patient and procedure to be performed. With the patient laying in a prone position, the surgical area was prepped and draped in the usual sterile fashion using ChloraPrep and a fenestrated drape. The level was determined under fluoroscopy guidance. Skin anesthesia was achieved by injecting Lidocaine 2% over the injection site. The interlaminar space was then  approached with a 20 gauge,  3.5 inch Tuohy needle that was introduced under fluoroscopic guidance in the AP, lateral and/or contralateral oblique imaging. Once the Ligamentum flavum was encountered loss of resistance to saline was used to enter the epidural space. With positive loss of resistance and negative aspiration for CSF or Blood, contrast dye Omnipaque (240mg/mL) was injected to confirm placement and there was no vascular runoff. 4 mL of the medication mixture listed above was injected slowly. Displacement of the radio opaque contrast after injection of the medication confirmed that the medication went into the area of the epidural space. The needles were removed and bleeding was nil. A sterile dressing was applied. No specimens collected. The patient tolerated the procedure well.     PAIN BEFORE THE PROCEDURE: 5-9/10    PAIN AFTER THE PROCEDURE: 0/10   The patient was monitored after the procedure in the recovery area. They were given post-procedure and discharge instructions to follow at home. The patient was discharged in a stable condition.        Tj Mayfield MD

## 2022-09-29 NOTE — DISCHARGE SUMMARY
Discharge Note  Short Stay      SUMMARY     Admit Date: 9/29/2022    Attending Physician: Tj Mayfield      Discharge Physician: Tj Mayfield      Discharge Date: 9/29/2022 12:11 PM    Procedure(s) (LRB):  LUMBAR L3/L4 IL MADELINE CONTRAST (N/A)    Final Diagnosis: Lumbar radiculopathy [M54.16]    Disposition: Home or self care    Patient Instructions:   Current Discharge Medication List        CONTINUE these medications which have NOT CHANGED    Details   montelukast (SINGULAIR) 10 mg tablet TAKE 1 TABLET BY MOUTH EVERY DAY AT NIGHT  Qty: 90 tablet, Refills: 3    Associated Diagnoses: Perennial allergic rhinitis      omeprazole (PRILOSEC) 40 MG capsule Take 1 capsule (40 mg total) by mouth every morning.  Qty: 30 capsule, Refills: 6      !! predniSONE (DELTASONE) 1 MG tablet Take 5 tablets (5 mg total) by mouth once daily.  Qty: 450 tablet, Refills: 1      acyclovir (ZOVIRAX) 200 MG capsule Take 1 capsule (200 mg total) by mouth 5 (five) times daily.  Qty: 35 capsule, Refills: 1      albuterol (PROVENTIL/VENTOLIN HFA) 90 mcg/actuation inhaler INHALE 2 PUFFS INTO THE LUNGS EVERY 6 (SIX) HOURS AS NEEDED. RESCUE  Qty: 20.1 g, Refills: 11      albuterol-ipratropium (DUO-NEB) 2.5 mg-0.5 mg/3 mL nebulizer solution Take 3 mLs by nebulization every 6 (six) hours as needed for Wheezing. Rescue  Qty: 90 mL, Refills: 3    Associated Diagnoses: Moderate persistent asthma with acute exacerbation; Bronchitis      ALPRAZolam (XANAX) 0.5 MG tablet Take Once on call to procedure  Qty: 1 tablet, Refills: 0    Associated Diagnoses: Left hip pain      aspirin 81 mg Tab Take 81 mg by mouth every morning.       budesonide-formoterol 160-4.5 mcg (SYMBICORT) 160-4.5 mcg/actuation HFAA INHALE 2 PUFFS INTO THE LUNGS EVERY 12 (TWELVE) HOURS.  Qty: 30.6 g, Refills: 3    Associated Diagnoses: Chronic asthma, mild intermittent, uncomplicated      calcium citrate-vitamin D3 315-200 mg (CITRACAL+D) 315 mg-5 mcg (200 unit) per tablet Take 1 tablet by  mouth 2 (two) times daily.       cycloSPORINE (RESTASIS) 0.05 % ophthalmic emulsion Instill one drop into both eyes two times per day  Qty: 60 each, Refills: 10      diclofenac sodium (VOLTAREN) 1 % Gel APPLY 2 GRAMS TOPICALLY 4 (FOUR) TIMES DAILY AS NEEDED.  Qty: 300 g, Refills: 2      estrogens, conjugated, (PREMARIN) 0.625 MG tablet take 1 tablet by mouth daily  Qty: 90 tablet, Refills: 4      fluconazole (DIFLUCAN) 100 MG tablet Take 1 tablet by mouth once daily for 14 days.  Qty: 14 tablet, Refills: 0      halobetasol propion-tazarotene (DUOBRII) 0.01-0.045 % Lotn aaa on feet and hands qhs  then vaseline and warm towel  Qty: 100 g, Refills: 3    Associated Diagnoses: Psoriasiform dermatitis      itraconazole (SPORANOX) 10 mg/mL Soln Take 20 mLs (200 mg total) by mouth once daily for 14 days. Discard the remainder.  Qty: 300 mL, Refills: 0      ketoconazole (NIZORAL) 2 % cream Apply to feet twice daily for 3 weeks  Qty: 60 g, Refills: 3    Associated Diagnoses: Tinea pedis of both feet      leflunomide (ARAVA) 20 MG Tab Take 1 tablet (20 mg total) by mouth once daily.  Qty: 90 tablet, Refills: 0    Associated Diagnoses: Rheumatoid arthritis      lifitegrast (XIIDRA) 5 % Dpet INSTILL ONE DROP INTO BOTH EYES TWICE A DAY  Qty: 60 mL, Refills: 10      magic mouthwash diphen/antac/lidoc Swish and Spit 5 mL by mouth for 30 seconds twice daily.  Qty: 150 mL, Refills: 0      methenamine (HIPREX) 1 gram Tab Take 1 tablet (1 g total) by mouth 2 (two) times daily.  Qty: 180 tablet, Refills: 3      mirabegron (MYRBETRIQ) 25 mg Tb24 ER tablet Take 1 tablet (25 mg total) by mouth once daily.  Qty: 90 tablet, Refills: 3    Associated Diagnoses: Urinary urgency; Urge urinary incontinence      naproxen (NAPROSYN) 500 MG tablet Take 1 tablet (500 mg total) by mouth 2 (two) times daily as needed.  Qty: 180 tablet, Refills: 0      ofloxacin (FLOXIN) 0.3 % otic solution Place 3 drops into both ears 2 (two) times daily for 10  days  Qty: 10 mL, Refills: 0    Comments: 10 ml ok per md      omeprazole-sodium bicarbonate (ZEGERID) 40-1.1 mg-gram per capsule TAKE 1 CAPSULE BY MOUTH EVERY DAY IN THE MORNING  Qty: 90 capsule, Refills: 3      POTASSIUM ORAL Take by mouth once daily.      !! predniSONE (DELTASONE) 5 MG tablet Take 1 tablet (5 mg total) by mouth once daily.  Qty: 90 tablet, Refills: 1      pregabalin (LYRICA) 50 MG capsule Take 2 capsules (100 mg total) by mouth every evening.  Qty: 180 capsule, Refills: 1    Associated Diagnoses: Fibromyalgia      PREMARIN vaginal cream PLACE 0.5 GRAM VAGINALLY 3 (THREE) TIMES A WEEK.  Qty: 30 g, Refills: 1      !! progesterone (PROMETRIUM) 100 MG capsule Take 1 capsule by mouth daily.  Qty: 90 capsule, Refills: 1    Associated Diagnoses: Unspecified ovarian cyst, unspecified side; Other specified counseling      !! progesterone (PROMETRIUM) 100 MG capsule take 1 capsule by mouth daily  Qty: 90 capsule, Refills: 4      promethazine (PHENERGAN) 25 MG tablet Take 1 tablet (25 mg total) by mouth every 6 (six) hours as needed for Nausea.  Qty: 30 tablet, Refills: 1      rosuvastatin (CRESTOR) 20 MG tablet Take 1 tablet (20 mg total) by mouth every evening.  Qty: 90 tablet, Refills: 3    Associated Diagnoses: Coronary artery disease involving native coronary artery of native heart without angina pectoris      sarilumab (KEVZARA) 200 mg/1.14 mL Syrg Inject 1.14 mL (200 mg total) into the skin every 14 (fourteen) days.  Qty: 2.28 mL, Refills: 2    Associated Diagnoses: Rheumatoid arthritis, involving unspecified site, unspecified whether rheumatoid factor present      !! sucralfate (CARAFATE) 1 gram tablet TAKE 1 TABLET (1 G TOTAL) BY MOUTH 4 (FOUR) TIMES DAILY.  Qty: 360 tablet, Refills: 2    Associated Diagnoses: Gastroparesis      !! sucralfate (CARAFATE) 1 gram tablet Take 1 tablet (1 g total) by mouth 4 (four) times daily.  Qty: 360 tablet, Refills: 3    Associated Diagnoses: Gastroparesis       traMADoL (ULTRAM) 50 mg tablet TAKE 1 TABLET (50 MG TOTAL) BY MOUTH EVERY 12 (TWELVE) HOURS AS NEEDED FOR PAIN.  Qty: 60 tablet, Refills: 1    Comments: Quantity prescribed more than 7 day supply? Yes, quantity medically necessary  Associated Diagnoses: Chronic hip pain, left      triamcinolone acetonide 0.1% (KENALOG) 0.1 % paste Place onto teeth 2 (two) times daily.  Qty: 5 g, Refills: 3       !! - Potential duplicate medications found. Please discuss with provider.              Discharge Diagnosis: Lumbar radiculopathy [M54.16]  Condition on Discharge: Stable with no complications to procedure   Diet on Discharge: Same as before.  Activity: as per instruction sheet.  Discharge to: Home with a responsible adult.  Follow up: 2-4 weeks       Please call my office or pager at [phone] if experienced any weakness or loss of sensation, fever > 101.5, pain uncontrolled with oral medications, persistent nausea/vomiting/or diarrhea, redness or drainage from the incisions, or any other worrisome concerns. If physician on call was not reached or could not communicate with our office for any reason please go to the nearest emergency department

## 2022-09-29 NOTE — DISCHARGE INSTRUCTIONS

## 2022-09-29 NOTE — H&P
HPI  Patient presenting for Procedure(s) (LRB):  LUMBAR L3/L4 IL MADELINE CONTRAST (N/A)     Patient on Anti-coagulation No    No health changes since previous encounter    Past Medical History:   Diagnosis Date    Acid reflux     Allergy     Anemia     Asthma     Coronary artery disease     CS (cervical spondylosis) 3/8/2013    Degenerative disc disease     Dry eyes     Dry mouth     Gastroparesis     History of methotrexate therapy 1/19/2022    Hyperlipidemia     Lateral meniscus derangement 4/6/2016    Lobular carcinoma in situ     Lumbar spondylosis 3/8/2013    Osteoarthritis     Osteoporosis     Rheumatoid arthritis(714.0)     Rupture of left triceps tendon 10/17/2018    Umbilical hernia 8/13/2015     Past Surgical History:   Procedure Laterality Date    BREAST BIOPSY Left 01/29/2002    core bx    CARPAL TUNNEL RELEASE Right 05/2017    CHOLECYSTECTOMY  2004    COLONOSCOPY      10/11    COLONOSCOPY N/A 6/29/2017    Procedure: COLONOSCOPY;  Surgeon: López Moore MD;  Location: [facility] ENDO ([address] [place] [address]);  Service: Endoscopy;  Laterality: N/A;    EPIDURAL STEROID INJECTION N/A 11/18/2021    Procedure: INJECTION, STEROID, EPIDURAL, L5-S1 IL NEED CONSENT;  Surgeon: Tj Mayfield MD;  Location: [facility] PAIN MGT;  Service: Pain Management;  Laterality: N/A;    INJECTION OF ANESTHETIC AGENT AROUND NERVE Bilateral 8/23/2021    Procedure: BLOCK, NERVE, MEDIAL BRANCH L3,L4,L5;  Surgeon: Tj Mayfield MD;  Location: [facility] PAIN MGT;  Service: Pain Management;  Laterality: Bilateral;  1 of 2    INJECTION OF ANESTHETIC AGENT AROUND NERVE Bilateral 8/26/2021    Procedure: BLOCK, NERVE, MEDIAL BRANCH L3,L4,L5;  Surgeon: Tj Mayfield MD;  Location: [facility] PAIN MGT;  Service: Pain Management;  Laterality: Bilateral;  2 of 2    INJECTION OF ANESTHETIC AGENT AROUND NERVE Left 7/7/2022    Procedure: BLOCK, NERVE, LEFT OBTURATOR AND FEMORAL;  Surgeon: Tj Mayfield MD;  Location: [facility] PAIN MGT;  Service: Pain Management;  Laterality: Left;    INJECTION  OF FACET JOINT Bilateral 3/12/2020    Procedure: FACET JOINT INJECTION (LUMBAR BLOCK) BILATERAL L4-5 AND L5-S1 DIRECT REFERRAL;  Surgeon: Tj Mayfield MD;  Location: [facility] PAIN MGT;  Service: Pain Management;  Laterality: Bilateral;  NEEDS CONSENT    INJECTION OF FACET JOINT Bilateral 3/29/2021    Procedure: INJECTION, FACET JOINT L3/4, L4/5, L5/S1;  Surgeon: Tj Mayfield MD;  Location: [facility] PAIN [facility]GT;  Service: Pain Management;  Laterality: Bilateral;    INJECTION OF JOINT Right 7/30/2020    Procedure: INJECTION, JOINT, RIGHT HIP Interarticular under flouro;  Surgeon: Tj Mayfield MD;  Location: [facility] PAIN [facility]GT;  Service: Pain Management;  Laterality: Right;  INJECTION, JOINT, RIGHT HIP Interarticular under flouro    INJECTION OF JOINT Right 2/21/2022    Procedure: Injection, Joint RIGHT ISCHIAL BURSA;  Surgeon: Tj Mayfield MD;  Location: [facility] PAIN MGT;  Service: Pain Management;  Laterality: Right;    INTRAMEDULLARY RODDING OF FEMUR Left 5/21/2019    Procedure: INSERTION, INTRAMEDULLARY ARIEL, FEMUR;  Surgeon: López Duff MD;  Location: [facility] OR 2ND FLR;  Service: Orthopedics;  Laterality: Left;    RADIOFREQUENCY ABLATION Left 9/20/2021    Procedure: RADIOFREQUENCY ABLATION left L3,4,5 RFA  1 of 2  CONSENT NEEDED;  Surgeon: Tj Mayfield MD;  Location: [facility] PAIN MGT;  Service: Pain Management;  Laterality: Left;    RADIOFREQUENCY ABLATION Right 10/4/2021    Procedure: RADIOFREQUENCY ABLATION  right L3,4,5 RFA   2 of 2  CONSENT NEEDED;  Surgeon: Tj Mayfield MD;  Location: [facility] PAIN MGT;  Service: Pain Management;  Laterality: Right;    RADIOFREQUENCY ABLATION Left 4/21/2022    Procedure: Radiofrequency Ablation LEFT L3,L4,L5;  Surgeon: Tj Mayfield MD;  Location: [facility] PAIN MGT;  Service: Pain Management;  Laterality: Left;    RADIOFREQUENCY ABLATION Right 5/12/2022    Procedure: Radiofrequency Ablation RIGHT L3,L4,L5;  Surgeon: Tj Mayfield MD;  Location: [facility] PAIN MGT;  Service: Pain Management;  Laterality:  Right;    RADIOFREQUENCY ABLATION Left 7/25/2022    Procedure: Radiofrequency Ablation Left Femoral & Obturator;  Surgeon: Tj Mayfield MD;  Location: [facility] PAIN [facility]GT;  Service: Pain Management;  Laterality: Left;    REPAIR OF TRICEPS TENDON Left 10/17/2018    Procedure: REPAIR, TENDON, TRICEPS left elbow;  Surgeon: Staci Yarbrough MD;  Location: [facility] OR 1ST FLR;  Service: Orthopedics;  Laterality: Left;  Anesthesia: General and regional. PRONE, k-wire , hand pan 1 and pan 2, CALL ARTHREX/Tamera notified 10-12 LO    TONSILLECTOMY      TRANSFORAMINAL EPIDURAL INJECTION OF STEROID Right 8/31/2020    Procedure: INJECTION, STEROID, EPIDURAL, TRANSFORAMINAL,  APPROACH, L3-L4 and L4-L5 need consent;  Surgeon: Tj Mayfield MD;  Location: [facility]IN [facility]GT;  Service: Pain Management;  Laterality: Right;    TRANSFORAMINAL EPIDURAL INJECTION OF STEROID Bilateral 7/23/2021    Procedure: INJECTION, STEROID, EPIDURAL, TRANSFORAMINAL APPROACH L4/5;  Surgeon: Tj Mayfield MD;  Location: [facility] PAIN [facility]GT;  Service: Pain Management;  Laterality: Bilateral;    TRIGGER POINT INJECTION N/A 7/23/2021    Procedure: INJECTION, TRIGGER POINT SCAPULAR;  Surgeon: Tj Mayfield MD;  Location: [facility] [place] [facility]IN [facility]GT;  Service: Pain Management;  Laterality: N/A;    TUBAL LIGATION  2003    UPPER GASTROINTESTINAL ENDOSCOPY      10/11    uterine ablation  2003     Review of patient's allergies indicates:   Allergen Reactions    Actemra [tocilizumab] Other (See Comments)     Severe dizziness    Codeine Nausea And Vomiting and Hives    Gold au 198 Hives and Rash    Hydroxychloroquine Other (See Comments)     Can't remember the reaction      Iodinated contrast media Other (See Comments)     Other reaction(s): BURNING ALL OVER    Iodine Other (See Comments) and Hives     Other reaction(s): BURNING ALL OVER - Iodine dye - Not topical    Ondansetron Nausea And Vomiting    Sulfa (sulfonamide antibiotics) Other (See Comments)     Can't remember the reaction     "Zofran [ondansetron hcl (pf)] Nausea And Vomiting     Pt reports that last time she received zofran she started vomiting again    Methotrexate analogues Nausea Only    Pneumococcal vaccine Other (See Comments)    Pneumovax 23 [pneumococcal 23-argenis ps vaccine] Other (See Comments)     "sick"    Methotrexate Nausea Only      No current facility-administered medications for this encounter.     Facility-Administered Medications Ordered in Other Encounters   Medication    triamcinolone acetonide injection 40 mg       PMHx, PSHx, Allergies, Medications reviewed in epic    ROS negative except pain complaints in HPI    OBJECTIVE:    LMP  (LMP Unknown)     PHYSICAL EXAMINATION:    GENERAL: Well appearing, in no acute distress, alert and oriented x3.  PSYCH:  Mood and affect appropriate.  SKIN: Skin color, texture, turgor normal, no rashes or lesions which will impact the procedure.  CV: Extremities warm  PULM: No evidence of respiratory difficulty, symmetric chest rise.   NEURO: Cranial nerves grossly intact.    Plan:    Proceed with procedure as planned Procedure(s) (LRB):  LUMBAR L3/L4 IL MADELINE CONTRAST (N/A)    Jose L Torres  09/29/2022            "

## 2022-09-30 ENCOUNTER — PATIENT MESSAGE (OUTPATIENT)
Dept: PAIN MEDICINE | Facility: OTHER | Age: 62
End: 2022-09-30
Payer: MEDICARE

## 2022-10-02 ENCOUNTER — PATIENT MESSAGE (OUTPATIENT)
Dept: ORTHOPEDICS | Facility: CLINIC | Age: 62
End: 2022-10-02
Payer: MEDICARE

## 2022-10-03 ENCOUNTER — TELEPHONE (OUTPATIENT)
Dept: RHEUMATOLOGY | Facility: CLINIC | Age: 62
End: 2022-10-03
Payer: MEDICARE

## 2022-10-03 ENCOUNTER — TELEPHONE (OUTPATIENT)
Dept: ORTHOPEDICS | Facility: CLINIC | Age: 62
End: 2022-10-03
Payer: MEDICARE

## 2022-10-03 DIAGNOSIS — E83.51 HYPOCALCEMIA: Primary | ICD-10-CM

## 2022-10-04 ENCOUNTER — OFFICE VISIT (OUTPATIENT)
Dept: ORTHOPEDICS | Facility: CLINIC | Age: 62
End: 2022-10-04
Payer: MEDICARE

## 2022-10-04 ENCOUNTER — OFFICE VISIT (OUTPATIENT)
Dept: PAIN MEDICINE | Facility: CLINIC | Age: 62
End: 2022-10-04
Payer: MEDICARE

## 2022-10-04 VITALS
DIASTOLIC BLOOD PRESSURE: 74 MMHG | BODY MASS INDEX: 26.06 KG/M2 | HEIGHT: 61 IN | WEIGHT: 138 LBS | SYSTOLIC BLOOD PRESSURE: 120 MMHG | HEART RATE: 95 BPM

## 2022-10-04 VITALS
HEART RATE: 96 BPM | DIASTOLIC BLOOD PRESSURE: 80 MMHG | HEIGHT: 61 IN | BODY MASS INDEX: 26.43 KG/M2 | SYSTOLIC BLOOD PRESSURE: 135 MMHG | TEMPERATURE: 99 F | RESPIRATION RATE: 18 BRPM | WEIGHT: 140 LBS

## 2022-10-04 DIAGNOSIS — M19.011 ARTHRITIS OF RIGHT ACROMIOCLAVICULAR JOINT: Primary | ICD-10-CM

## 2022-10-04 DIAGNOSIS — M15.9 OSTEOARTHRITIS INVOLVING MULTIPLE JOINTS ON BOTH SIDES OF BODY: ICD-10-CM

## 2022-10-04 DIAGNOSIS — M54.16 LUMBAR RADICULOPATHY: Primary | ICD-10-CM

## 2022-10-04 DIAGNOSIS — M51.36 DDD (DEGENERATIVE DISC DISEASE), LUMBAR: ICD-10-CM

## 2022-10-04 DIAGNOSIS — G89.29 OTHER CHRONIC PAIN: ICD-10-CM

## 2022-10-04 DIAGNOSIS — M79.18 MYOFASCIAL PAIN: ICD-10-CM

## 2022-10-04 PROCEDURE — 3074F PR MOST RECENT SYSTOLIC BLOOD PRESSURE < 130 MM HG: ICD-10-PCS | Mod: CPTII,S$GLB,, | Performed by: ORTHOPAEDIC SURGERY

## 2022-10-04 PROCEDURE — 3075F PR MOST RECENT SYSTOLIC BLOOD PRESS GE 130-139MM HG: ICD-10-PCS | Mod: CPTII,S$GLB,, | Performed by: NURSE PRACTITIONER

## 2022-10-04 PROCEDURE — 3008F BODY MASS INDEX DOCD: CPT | Mod: CPTII,S$GLB,, | Performed by: ORTHOPAEDIC SURGERY

## 2022-10-04 PROCEDURE — 99214 PR OFFICE/OUTPT VISIT, EST, LEVL IV, 30-39 MIN: ICD-10-PCS | Mod: S$GLB,,, | Performed by: NURSE PRACTITIONER

## 2022-10-04 PROCEDURE — 3079F PR MOST RECENT DIASTOLIC BLOOD PRESSURE 80-89 MM HG: ICD-10-PCS | Mod: CPTII,S$GLB,, | Performed by: NURSE PRACTITIONER

## 2022-10-04 PROCEDURE — 99999 PR PBB SHADOW E&M-EST. PATIENT-LVL IV: CPT | Mod: PBBFAC,,, | Performed by: ORTHOPAEDIC SURGERY

## 2022-10-04 PROCEDURE — 3044F HG A1C LEVEL LT 7.0%: CPT | Mod: CPTII,S$GLB,, | Performed by: ORTHOPAEDIC SURGERY

## 2022-10-04 PROCEDURE — 99214 PR OFFICE/OUTPT VISIT, EST, LEVL IV, 30-39 MIN: ICD-10-PCS | Mod: S$GLB,,, | Performed by: ORTHOPAEDIC SURGERY

## 2022-10-04 PROCEDURE — 3044F PR MOST RECENT HEMOGLOBIN A1C LEVEL <7.0%: ICD-10-PCS | Mod: CPTII,S$GLB,, | Performed by: ORTHOPAEDIC SURGERY

## 2022-10-04 PROCEDURE — 99214 OFFICE O/P EST MOD 30 MIN: CPT | Mod: S$GLB,,, | Performed by: NURSE PRACTITIONER

## 2022-10-04 PROCEDURE — 3044F HG A1C LEVEL LT 7.0%: CPT | Mod: CPTII,S$GLB,, | Performed by: NURSE PRACTITIONER

## 2022-10-04 PROCEDURE — 99999 PR PBB SHADOW E&M-EST. PATIENT-LVL V: ICD-10-PCS | Mod: PBBFAC,,, | Performed by: NURSE PRACTITIONER

## 2022-10-04 PROCEDURE — 3008F PR BODY MASS INDEX (BMI) DOCUMENTED: ICD-10-PCS | Mod: CPTII,S$GLB,, | Performed by: NURSE PRACTITIONER

## 2022-10-04 PROCEDURE — 3079F DIAST BP 80-89 MM HG: CPT | Mod: CPTII,S$GLB,, | Performed by: NURSE PRACTITIONER

## 2022-10-04 PROCEDURE — 3008F BODY MASS INDEX DOCD: CPT | Mod: CPTII,S$GLB,, | Performed by: NURSE PRACTITIONER

## 2022-10-04 PROCEDURE — 1160F PR REVIEW ALL MEDS BY PRESCRIBER/CLIN PHARMACIST DOCUMENTED: ICD-10-PCS | Mod: CPTII,S$GLB,, | Performed by: NURSE PRACTITIONER

## 2022-10-04 PROCEDURE — 99999 PR PBB SHADOW E&M-EST. PATIENT-LVL IV: ICD-10-PCS | Mod: PBBFAC,,, | Performed by: ORTHOPAEDIC SURGERY

## 2022-10-04 PROCEDURE — 99999 PR PBB SHADOW E&M-EST. PATIENT-LVL V: CPT | Mod: PBBFAC,,, | Performed by: NURSE PRACTITIONER

## 2022-10-04 PROCEDURE — 99214 OFFICE O/P EST MOD 30 MIN: CPT | Mod: S$GLB,,, | Performed by: ORTHOPAEDIC SURGERY

## 2022-10-04 PROCEDURE — 3044F PR MOST RECENT HEMOGLOBIN A1C LEVEL <7.0%: ICD-10-PCS | Mod: CPTII,S$GLB,, | Performed by: NURSE PRACTITIONER

## 2022-10-04 PROCEDURE — 3074F SYST BP LT 130 MM HG: CPT | Mod: CPTII,S$GLB,, | Performed by: ORTHOPAEDIC SURGERY

## 2022-10-04 PROCEDURE — 1159F MED LIST DOCD IN RCRD: CPT | Mod: CPTII,S$GLB,, | Performed by: NURSE PRACTITIONER

## 2022-10-04 PROCEDURE — 3078F DIAST BP <80 MM HG: CPT | Mod: CPTII,S$GLB,, | Performed by: ORTHOPAEDIC SURGERY

## 2022-10-04 PROCEDURE — 1160F RVW MEDS BY RX/DR IN RCRD: CPT | Mod: CPTII,S$GLB,, | Performed by: NURSE PRACTITIONER

## 2022-10-04 PROCEDURE — 3075F SYST BP GE 130 - 139MM HG: CPT | Mod: CPTII,S$GLB,, | Performed by: NURSE PRACTITIONER

## 2022-10-04 PROCEDURE — 3008F PR BODY MASS INDEX (BMI) DOCUMENTED: ICD-10-PCS | Mod: CPTII,S$GLB,, | Performed by: ORTHOPAEDIC SURGERY

## 2022-10-04 PROCEDURE — 3078F PR MOST RECENT DIASTOLIC BLOOD PRESSURE < 80 MM HG: ICD-10-PCS | Mod: CPTII,S$GLB,, | Performed by: ORTHOPAEDIC SURGERY

## 2022-10-04 PROCEDURE — 1159F PR MEDICATION LIST DOCUMENTED IN MEDICAL RECORD: ICD-10-PCS | Mod: CPTII,S$GLB,, | Performed by: NURSE PRACTITIONER

## 2022-10-04 NOTE — PROGRESS NOTES
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 ears 2 (two) times daily for 10 days 10 mL 0    omeprazole (PRILOSEC) 40 MG capsule Take 1 capsule (40 mg total) by mouth every morning. 30 capsule 6    omeprazole-sodium bicarbonate (ZEGERID) 40-1.1 mg-gram per capsule TAKE 1 CAPSULE BY MOUTH EVERY DAY IN THE MORNING 90 capsule 3    POTASSIUM ORAL Take by mouth once daily.      predniSONE (DELTASONE) 1 MG tablet Take 5 tablets (5 mg total) by mouth once daily. 450 tablet 1    predniSONE (DELTASONE) 5 MG tablet Take 1 tablet (5 mg total) by mouth once daily. 90 tablet 1    pregabalin (LYRICA) 50 MG capsule Take 2 capsules (100 mg total) by mouth every evening. 180 capsule 1    PREMARIN vaginal cream PLACE 0.5 GRAM VAGINALLY 3 (THREE) TIMES A WEEK. 30 g 1    progesterone (PROMETRIUM) 100 MG capsule Take 1 capsule by mouth daily. 90 capsule 1    progesterone (PROMETRIUM) 100 MG capsule take 1 capsule by mouth daily 90 capsule 4    promethazine (PHENERGAN) 25 MG tablet Take 1 tablet (25 mg total) by mouth every 6 (six) hours as needed for Nausea. 30 tablet 1    rosuvastatin (CRESTOR) 20 MG tablet Take 1 tablet (20 mg total) by mouth every evening. 90 tablet 3    sarilumab (KEVZARA) 200 mg/1.14 mL Syrg Inject 1.14 mL (200 mg total) into the skin every 14 (fourteen) days. 2.28 mL 2    sucralfate (CARAFATE) 1 gram tablet TAKE 1 TABLET (1 G TOTAL) BY MOUTH 4 (FOUR)  TIMES DAILY. 360 tablet 2    sucralfate (CARAFATE) 1 gram tablet Take 1 tablet (1 g total) by mouth 4 (four) times daily. 360 tablet 3    traMADoL (ULTRAM) 50 mg tablet TAKE 1 TABLET (50 MG TOTAL) BY MOUTH EVERY 12 (TWELVE) HOURS AS NEEDED FOR PAIN. 60 tablet 1    triamcinolone acetonide 0.1% (KENALOG) 0.1 % paste Place onto teeth 2 (two) times daily. 5 g 3     No current facility-administered medications for this visit.       REVIEW OF SYSTEMS:    GENERAL:  No weight loss, malaise or fevers.  HEENT:  Negative for frequent or significant headaches.  NECK:  Negative for lumps, goiter, pain and significant neck swelling.  RESPIRATORY:  Negative for cough, wheezing or shortness of breath. Asthma.  CARDIOVASCULAR:  Negative for chest pain, leg swelling or palpitations. CAD.  GI:  Negative for abdominal discomfort, blood in stools or black stools or change in bowel habits.  MUSCULOSKELETAL:  See HPI.  SKIN:  Negative for lesions, rash, and itching.  PSYCH:  Negative for sleep disturbance, mood disorder and recent psychosocial stressors.  HEMATOLOGY/LYMPHOLOGY:  Negative for prolonged bleeding, bruising easily or swollen nodes.  NEURO:   No history of headaches, syncope, paralysis, seizures or tremors.  All other reviewed and negative other than HPI.    Past Medical History:  Past Medical History:   Diagnosis Date    Acid reflux     Allergy     Anemia     Asthma     Coronary artery disease     CS (cervical spondylosis) 3/8/2013    Degenerative disc disease     Dry eyes     Dry mouth     Gastroparesis     History of methotrexate therapy 1/19/2022    Hyperlipidemia     Lateral meniscus derangement 4/6/2016    Lobular carcinoma in situ     Lumbar spondylosis 3/8/2013    Osteoarthritis     Osteoporosis     Rheumatoid arthritis(714.0)     Rupture of left triceps tendon 10/17/2018    Umbilical hernia 8/13/2015       Past Surgical History:  Past Surgical History:   Procedure Laterality Date    BREAST BIOPSY Left 01/29/2002     core bx    CARPAL TUNNEL RELEASE Right 05/2017    CHOLECYSTECTOMY  2004    COLONOSCOPY      10/11    COLONOSCOPY N/A 6/29/2017    Procedure: COLONOSCOPY;  Surgeon: López Moore MD;  [facility]  Service: Endoscopy;  Laterality: N/A;    EPIDURAL STEROID INJECTION N/A 11/18/2021    Procedure: INJECTION, STEROID, EPIDURAL, L5-S1 IL NEED CONSENT;  Surgeon: Tj Mayfield MD;  Location: [facility] PAIN MGT;  Service: Pain Management;  Laterality: N/A;    EPIDURAL STEROID INJECTION N/A 9/29/2022    Procedure: LUMBAR L3/L4 IL MADELINE CONTRAST;  Surgeon: Tj Mayfield MD;  Location: [facility] PAIN MGT;  Service: Pain Management;  Laterality: N/A;    INJECTION OF ANESTHETIC AGENT AROUND NERVE Bilateral 8/23/2021    Procedure: BLOCK, NERVE, MEDIAL BRANCH L3,L4,L5;  Surgeon: Tj Mayfield MD;  Location: [facility] PAIN MGT;  Service: Pain Management;  Laterality: Bilateral;  1 of 2    INJECTION OF ANESTHETIC AGENT AROUND NERVE Bilateral 8/26/2021    Procedure: BLOCK, NERVE, MEDIAL BRANCH L3,L4,L5;  Surgeon: Tj Mayfield MD;  Location: [facility] PAIN MGT;  Service: Pain Management;  Laterality: Bilateral;  2 of 2    INJECTION OF ANESTHETIC AGENT AROUND NERVE Left 7/7/2022    Procedure: BLOCK, NERVE, LEFT OBTURATOR AND FEMORAL;  Surgeon: Tj Mayfield MD;  Location: [facility] PAIN MGT;  Service: Pain Management;  Laterality: Left;    INJECTION OF FACET JOINT Bilateral 3/12/2020    Procedure: FACET JOINT INJECTION (LUMBAR BLOCK) BILATERAL L4-5 AND L5-S1 DIRECT REFERRAL;  Surgeon: Tj Mayfield MD;  Location: [facility] PAIN MGT;  Service: Pain Management;  Laterality: Bilateral;  NEEDS CONSENT    INJECTION OF FACET JOINT Bilateral 3/29/2021    Procedure: INJECTION, FACET JOINT L3/4, L4/5, L5/S1;  Surgeon: Tj Mayfield MD;  Location: [facility] PAIN MGT;  Service: Pain Management;  Laterality: Bilateral;    INJECTION OF JOINT Right 7/30/2020    Procedure: INJECTION, JOINT, RIGHT HIP Interarticular under flouro;  Surgeon: Tj Mayfield MD;  Location: [facility] PAIN  MGT;  Service: Pain Management;  Laterality: Right;  INJECTION, JOINT, RIGHT HIP Interarticular under flouro    INJECTION OF JOINT Right 2/21/2022    Procedure: Injection, Joint RIGHT ISCHIAL BURSA;  Surgeon: Tj Mayfield MD;  Location: [facility] PAIN MGT;  Service: Pain Management;  Laterality: Right;    INTRAMEDULLARY RODDING OF FEMUR Left 5/21/2019    Procedure: INSERTION, INTRAMEDULLARY ARIEL, FEMUR;  Surgeon: López Duff MD;  Location: [facility] OR 2ND FLR;  Service: Orthopedics;  Laterality: Left;    RADIOFREQUENCY ABLATION Left 9/20/2021    Procedure: RADIOFREQUENCY ABLATION left L3,4,5 RFA  1 of 2  CONSENT NEEDED;  Surgeon: Tj Mayfield MD;  Location: [facility] PAIN MGT;  Service: Pain Management;  Laterality: Left;    RADIOFREQUENCY ABLATION Right 10/4/2021    Procedure: RADIOFREQUENCY ABLATION  right L3,4,5 RFA   2 of 2  CONSENT NEEDED;  Surgeon: Tj Mayfield MD;  Location: [facility] PAIN MGT;  Service: Pain Management;  Laterality: Right;    RADIOFREQUENCY ABLATION Left 4/21/2022    Procedure: Radiofrequency Ablation LEFT L3,L4,L5;  Surgeon: Tj Mayfield MD;  Location: [facility] PAIN MGT;  Service: Pain Management;  Laterality: Left;    RADIOFREQUENCY ABLATION Right 5/12/2022    Procedure: Radiofrequency Ablation RIGHT L3,L4,L5;  Surgeon: Tj Mayfield MD;  Location: [facility] PAIN MGT;  Service: Pain Management;  Laterality: Right;    RADIOFREQUENCY ABLATION Left 7/25/2022    Procedure: Radiofrequency Ablation Left Femoral & Obturator;  Surgeon: Tj Mayfield MD;  Location: [facility] PAIN MGT;  Service: Pain Management;  Laterality: Left;    REPAIR OF TRICEPS TENDON Left 10/17/2018    Procedure: REPAIR, TENDON, TRICEPS left elbow;  Surgeon: Staci Yarbrough MD;  Location: [facility] OR 1ST FLR;  Service: Orthopedics;  Laterality: Left;  Anesthesia: General and regional. PRONE, k-wire , hand pan 1 and pan 2, CALL ARTHREX/Tamera notified 10-12 LO    TONSILLECTOMY      TRANSFORAMINAL EPIDURAL INJECTION OF STEROID Right 8/31/2020     Procedure: INJECTION, STEROID, EPIDURAL, TRANSFORAMINAL,  APPROACH, L3-L4 and L4-L5 need consent;  Surgeon: Tj Myafield MD;  Location: [facility] PAIN MGT;  Service: Pain Management;  Laterality: Right;    TRANSFORAMINAL EPIDURAL INJECTION OF STEROID Bilateral 7/23/2021    Procedure: INJECTION, STEROID, EPIDURAL, TRANSFORAMINAL APPROACH L4/5;  Surgeon: Tj Mayfield MD;  Location: [facility] PAIN MGT;  Service: Pain Management;  Laterality: Bilateral;    TRIGGER POINT INJECTION N/A 7/23/2021    Procedure: INJECTION, TRIGGER POINT SCAPULAR;  Surgeon: Tj Mayfield MD;  Location: [facility] PAIN MGT;  Service: Pain Management;  Laterality: N/A;    TUBAL LIGATION  2003    UPPER GASTROINTESTINAL ENDOSCOPY      10/11    uterine ablation  2003       Family History:  Family History   Problem Relation Age of Onset    Cancer Mother         Lung Cancer    Emphysema Mother     Heart attack Mother     Alcohol abuse Mother     Cancer Father         Lung Cancer    Osteoarthritis Father     Lung cancer Father     Skin cancer Father     Alcohol abuse Father     Heart disease Brother     Heart attack Brother     Alcohol abuse Brother     Cirrhosis Brother     Osteoarthritis Paternal Aunt     Retinal detachment Maternal Aunt     No Known Problems Sister     No Known Problems Maternal Uncle     No Known Problems Paternal Uncle     No Known Problems Maternal Grandmother     No Known Problems Maternal Grandfather     No Known Problems Paternal Grandmother     No Known Problems Paternal Grandfather     Colon cancer Neg Hx     Esophageal cancer Neg Hx     Stomach cancer Neg Hx     Celiac disease Neg Hx     Diabetes Neg Hx     Thyroid disease Neg Hx     Amblyopia Neg Hx     Blindness Neg Hx     Cataracts Neg Hx     Glaucoma Neg Hx     Hypertension Neg Hx     Macular degeneration Neg Hx     Strabismus Neg Hx     Stroke Neg Hx        Social History:  Social History     Socioeconomic History    Marital status:    Tobacco Use    Smoking status: Never     "Smokeless tobacco: Never   Substance and Sexual Activity    Alcohol use: Yes     Comment: 2-3 times per year.    Drug use: No    Sexual activity: Yes     Partners: Male     Birth control/protection: Surgical     Social Determinants of Health     Financial Resource Strain: Low Risk     Difficulty of Paying Living Expenses: Not hard at all   Food Insecurity: No Food Insecurity    Worried About Running Out of Food in the Last Year: Never true    Ran Out of Food in the Last Year: Never true   Transportation Needs: No Transportation Needs    Lack of Transportation (Medical): No    Lack of Transportation (Non-Medical): No   Physical Activity: Unknown    Days of Exercise per Week: 2 days   Stress: No Stress Concern Present    Feeling of Stress : Not at all   Social Connections: Unknown    Frequency of Communication with Friends and Family: More than three times a week    Frequency of Social Gatherings with Friends and Family: Twice a week    Active Member of Clubs or Organizations: Yes    Attends Club or Organization Meetings: 1 to 4 times per year    Marital Status:    Housing Stability: Low Risk     Unable to Pay for Housing in the Last Year: No    Number of Places Lived in the Last Year: 1    Unstable Housing in the Last Year: No       OBJECTIVE:    /80   Pulse 96   Temp 99.1 °F (37.3 °C) (Oral)   Resp 18   Ht 5' 1" (1.549 m)   Wt 63.5 kg (140 lb)   LMP  (LMP Unknown)   BMI 26.45 kg/m²     PHYSICAL EXAMINATION:    General appearance: Well appearing, in no acute distress, alert.  Psych:  Mood and affect appropriate.  Back:  No TTP over lumbar facet joints bilaterally. Limited ROM with pain on lumbar extension and flexion.  Facet loading bilaterally L>R. Negative SLR bilaterally.   MSK: TTP to right ischial bursa but does not reproduce radicular symptoms. No pain on movement of right knee. No pain on extension of right knee. TTP at left lateral and anterior hip.  Pain with manipulation of left " hip.  Neuro: No loss of sensation.  Kelsey is negative bilaterally.  Gait: Antalgic- ambulates with cane.    ASSESSMENT: 62 y.o. year old female with  Left sided back and hip pain, consistent with the following diagnoses:     1. Lumbar radiculopathy  Ambulatory referral/consult to Physical/Occupational Therapy      2. Other chronic pain        3. DDD (degenerative disc disease), lumbar  Ambulatory referral/consult to Physical/Occupational Therapy      4. Myofascial pain  Ambulatory referral/consult to Physical/Occupational Therapy      5. Osteoarthritis involving multiple joints on both sides of body              PLAN:      - Pt is s/p L3/4 IL MADELINE. I advised the patient that she may obtain relief over the next couple of weeks as she is only 1 week out.    - Previous Lumbar MRI reviewed.     - Start PT.    - The patient will continue a home exercise routine to help with pain and strengthening.      - RTC in 6-8 weeks or sooner if needed.    The above plan and management options were discussed at length with patient. Patient is in agreement with the above and verbalized understanding.    Valarie Law  10/04/2022    I spent a total of 30 minutes on the day of the visit.  This includes face to face time and non-face to face time preparing to see the patient by reviewing previous labs/imaging, obtaining and/or reviewing separately obtained history, documenting clinical information in the electronic or other health record, independently interpreting results and communicating results to the patient/family/caregiver.

## 2022-10-05 ENCOUNTER — PATIENT MESSAGE (OUTPATIENT)
Dept: GASTROENTEROLOGY | Facility: CLINIC | Age: 62
End: 2022-10-05
Payer: MEDICARE

## 2022-10-05 DIAGNOSIS — K31.84 GASTROPARESIS: Primary | ICD-10-CM

## 2022-10-06 ENCOUNTER — OFFICE VISIT (OUTPATIENT)
Dept: RHEUMATOLOGY | Facility: CLINIC | Age: 62
End: 2022-10-06
Payer: MEDICARE

## 2022-10-06 ENCOUNTER — IMMUNIZATION (OUTPATIENT)
Dept: INTERNAL MEDICINE | Facility: CLINIC | Age: 62
End: 2022-10-06
Payer: MEDICARE

## 2022-10-06 ENCOUNTER — OFFICE VISIT (OUTPATIENT)
Dept: OTOLARYNGOLOGY | Facility: CLINIC | Age: 62
End: 2022-10-06
Payer: MEDICARE

## 2022-10-06 VITALS
SYSTOLIC BLOOD PRESSURE: 126 MMHG | DIASTOLIC BLOOD PRESSURE: 75 MMHG | WEIGHT: 142.88 LBS | HEART RATE: 97 BPM | BODY MASS INDEX: 26.98 KG/M2 | HEIGHT: 61 IN

## 2022-10-06 VITALS
WEIGHT: 144.38 LBS | HEART RATE: 99 BPM | SYSTOLIC BLOOD PRESSURE: 130 MMHG | BODY MASS INDEX: 27.28 KG/M2 | DIASTOLIC BLOOD PRESSURE: 82 MMHG

## 2022-10-06 DIAGNOSIS — M06.9 RHEUMATOID ARTHRITIS: ICD-10-CM

## 2022-10-06 DIAGNOSIS — K11.20 SIALOADENITIS OF SUBLINGUAL GLAND: ICD-10-CM

## 2022-10-06 DIAGNOSIS — H60.8X3 CHRONIC ECZEMATOUS OTITIS EXTERNA OF BOTH EARS: ICD-10-CM

## 2022-10-06 DIAGNOSIS — H61.91 LESION OF EXTERNAL EAR CANAL, RIGHT: Primary | ICD-10-CM

## 2022-10-06 DIAGNOSIS — Z23 NEED FOR VACCINATION: Primary | ICD-10-CM

## 2022-10-06 DIAGNOSIS — M06.9 RHEUMATOID ARTHRITIS, INVOLVING UNSPECIFIED SITE, UNSPECIFIED WHETHER RHEUMATOID FACTOR PRESENT: ICD-10-CM

## 2022-10-06 DIAGNOSIS — M79.7 FIBROMYALGIA: ICD-10-CM

## 2022-10-06 DIAGNOSIS — K11.8 SUBMANDIBULAR GLAND TENDERNESS: ICD-10-CM

## 2022-10-06 DIAGNOSIS — K13.79 ORAL PAIN: ICD-10-CM

## 2022-10-06 DIAGNOSIS — M19.90 OSTEOARTHRITIS, UNSPECIFIED OSTEOARTHRITIS TYPE, UNSPECIFIED SITE: ICD-10-CM

## 2022-10-06 DIAGNOSIS — L65.9 HAIR LOSS: ICD-10-CM

## 2022-10-06 DIAGNOSIS — D84.9 IMMUNOSUPPRESSED STATUS: Primary | ICD-10-CM

## 2022-10-06 DIAGNOSIS — K11.20 SIALOADENITIS OF SUBMANDIBULAR GLAND: ICD-10-CM

## 2022-10-06 PROCEDURE — 1159F MED LIST DOCD IN RCRD: CPT | Mod: CPTII,S$GLB,, | Performed by: INTERNAL MEDICINE

## 2022-10-06 PROCEDURE — 3008F BODY MASS INDEX DOCD: CPT | Mod: CPTII,S$GLB,, | Performed by: OTOLARYNGOLOGY

## 2022-10-06 PROCEDURE — 3044F HG A1C LEVEL LT 7.0%: CPT | Mod: CPTII,S$GLB,, | Performed by: INTERNAL MEDICINE

## 2022-10-06 PROCEDURE — 3079F DIAST BP 80-89 MM HG: CPT | Mod: CPTII,S$GLB,, | Performed by: INTERNAL MEDICINE

## 2022-10-06 PROCEDURE — 99999 PR PBB SHADOW E&M-EST. PATIENT-LVL V: CPT | Mod: PBBFAC,,, | Performed by: OTOLARYNGOLOGY

## 2022-10-06 PROCEDURE — 1159F PR MEDICATION LIST DOCUMENTED IN MEDICAL RECORD: ICD-10-PCS | Mod: CPTII,S$GLB,, | Performed by: INTERNAL MEDICINE

## 2022-10-06 PROCEDURE — 1159F PR MEDICATION LIST DOCUMENTED IN MEDICAL RECORD: ICD-10-PCS | Mod: CPTII,S$GLB,, | Performed by: OTOLARYNGOLOGY

## 2022-10-06 PROCEDURE — 99999 PR PBB SHADOW E&M-EST. PATIENT-LVL V: ICD-10-PCS | Mod: PBBFAC,,, | Performed by: INTERNAL MEDICINE

## 2022-10-06 PROCEDURE — 99999 PR PBB SHADOW E&M-EST. PATIENT-LVL V: ICD-10-PCS | Mod: PBBFAC,,, | Performed by: OTOLARYNGOLOGY

## 2022-10-06 PROCEDURE — 91312 COVID-19, MRNA, LNP-S, BIVALENT BOOSTER, PF, 30 MCG/0.3 ML DOSE: CPT | Mod: S$GLB,,, | Performed by: INTERNAL MEDICINE

## 2022-10-06 PROCEDURE — 3074F PR MOST RECENT SYSTOLIC BLOOD PRESSURE < 130 MM HG: ICD-10-PCS | Mod: CPTII,S$GLB,, | Performed by: OTOLARYNGOLOGY

## 2022-10-06 PROCEDURE — 0124A COVID-19, MRNA, LNP-S, BIVALENT BOOSTER, PF, 30 MCG/0.3 ML DOSE: CPT | Mod: CV19,PBBFAC | Performed by: INTERNAL MEDICINE

## 2022-10-06 PROCEDURE — 3075F SYST BP GE 130 - 139MM HG: CPT | Mod: CPTII,S$GLB,, | Performed by: INTERNAL MEDICINE

## 2022-10-06 PROCEDURE — 91312 COVID-19, MRNA, LNP-S, BIVALENT BOOSTER, PF, 30 MCG/0.3 ML DOSE: ICD-10-PCS | Mod: S$GLB,,, | Performed by: INTERNAL MEDICINE

## 2022-10-06 PROCEDURE — 99213 OFFICE O/P EST LOW 20 MIN: CPT | Mod: S$GLB,,, | Performed by: INTERNAL MEDICINE

## 2022-10-06 PROCEDURE — 90686 FLU VACCINE (QUAD) GREATER THAN OR EQUAL TO 3YO PRESERVATIVE FREE IM: ICD-10-PCS | Mod: S$GLB,,, | Performed by: INTERNAL MEDICINE

## 2022-10-06 PROCEDURE — 3078F PR MOST RECENT DIASTOLIC BLOOD PRESSURE < 80 MM HG: ICD-10-PCS | Mod: CPTII,S$GLB,, | Performed by: OTOLARYNGOLOGY

## 2022-10-06 PROCEDURE — G0008 ADMIN INFLUENZA VIRUS VAC: HCPCS | Mod: S$GLB,,, | Performed by: INTERNAL MEDICINE

## 2022-10-06 PROCEDURE — 0124A PR IMMUNIZ ADMIN, SARS-COV- 2 COVID-19 VACCINE, 30MCG/0.3ML, BIVALENT, BOOSTER DOSE: ICD-10-PCS | Mod: S$GLB,,, | Performed by: INTERNAL MEDICINE

## 2022-10-06 PROCEDURE — 90686 IIV4 VACC NO PRSV 0.5 ML IM: CPT | Mod: S$GLB,,, | Performed by: INTERNAL MEDICINE

## 2022-10-06 PROCEDURE — 3008F PR BODY MASS INDEX (BMI) DOCUMENTED: ICD-10-PCS | Mod: CPTII,S$GLB,, | Performed by: INTERNAL MEDICINE

## 2022-10-06 PROCEDURE — 3008F PR BODY MASS INDEX (BMI) DOCUMENTED: ICD-10-PCS | Mod: CPTII,S$GLB,, | Performed by: OTOLARYNGOLOGY

## 2022-10-06 PROCEDURE — 3078F DIAST BP <80 MM HG: CPT | Mod: CPTII,S$GLB,, | Performed by: OTOLARYNGOLOGY

## 2022-10-06 PROCEDURE — 99999 PR PBB SHADOW E&M-EST. PATIENT-LVL V: CPT | Mod: PBBFAC,,, | Performed by: INTERNAL MEDICINE

## 2022-10-06 PROCEDURE — 99214 OFFICE O/P EST MOD 30 MIN: CPT | Mod: S$GLB,,, | Performed by: OTOLARYNGOLOGY

## 2022-10-06 PROCEDURE — 99214 PR OFFICE/OUTPT VISIT, EST, LEVL IV, 30-39 MIN: ICD-10-PCS | Mod: S$GLB,,, | Performed by: OTOLARYNGOLOGY

## 2022-10-06 PROCEDURE — 3044F PR MOST RECENT HEMOGLOBIN A1C LEVEL <7.0%: ICD-10-PCS | Mod: CPTII,S$GLB,, | Performed by: INTERNAL MEDICINE

## 2022-10-06 PROCEDURE — 3044F PR MOST RECENT HEMOGLOBIN A1C LEVEL <7.0%: ICD-10-PCS | Mod: CPTII,S$GLB,, | Performed by: OTOLARYNGOLOGY

## 2022-10-06 PROCEDURE — 99213 PR OFFICE/OUTPT VISIT, EST, LEVL III, 20-29 MIN: ICD-10-PCS | Mod: S$GLB,,, | Performed by: INTERNAL MEDICINE

## 2022-10-06 PROCEDURE — 3044F HG A1C LEVEL LT 7.0%: CPT | Mod: CPTII,S$GLB,, | Performed by: OTOLARYNGOLOGY

## 2022-10-06 PROCEDURE — 3079F PR MOST RECENT DIASTOLIC BLOOD PRESSURE 80-89 MM HG: ICD-10-PCS | Mod: CPTII,S$GLB,, | Performed by: INTERNAL MEDICINE

## 2022-10-06 PROCEDURE — 3008F BODY MASS INDEX DOCD: CPT | Mod: CPTII,S$GLB,, | Performed by: INTERNAL MEDICINE

## 2022-10-06 PROCEDURE — 1159F MED LIST DOCD IN RCRD: CPT | Mod: CPTII,S$GLB,, | Performed by: OTOLARYNGOLOGY

## 2022-10-06 PROCEDURE — 0124A PR IMMUNIZ ADMIN, SARS-COV- 2 COVID-19 VACCINE, 30MCG/0.3ML, BIVALENT, BOOSTER DOSE: CPT | Mod: S$GLB,,, | Performed by: INTERNAL MEDICINE

## 2022-10-06 PROCEDURE — 3074F SYST BP LT 130 MM HG: CPT | Mod: CPTII,S$GLB,, | Performed by: OTOLARYNGOLOGY

## 2022-10-06 PROCEDURE — 3075F PR MOST RECENT SYSTOLIC BLOOD PRESS GE 130-139MM HG: ICD-10-PCS | Mod: CPTII,S$GLB,, | Performed by: INTERNAL MEDICINE

## 2022-10-06 PROCEDURE — G0008 FLU VACCINE (QUAD) GREATER THAN OR EQUAL TO 3YO PRESERVATIVE FREE IM: ICD-10-PCS | Mod: S$GLB,,, | Performed by: INTERNAL MEDICINE

## 2022-10-06 RX ORDER — DICLOFENAC SODIUM 10 MG/G
GEL TOPICAL
Qty: 300 G | Refills: 2 | Status: SHIPPED | OUTPATIENT
Start: 2022-10-06 | End: 2023-01-10

## 2022-10-06 RX ORDER — NAPROXEN 500 MG/1
500 TABLET ORAL 2 TIMES DAILY PRN
Qty: 180 TABLET | Refills: 0 | Status: ON HOLD | OUTPATIENT
Start: 2022-10-06 | End: 2023-01-11 | Stop reason: HOSPADM

## 2022-10-06 RX ORDER — CLINDAMYCIN HYDROCHLORIDE 300 MG/1
300 CAPSULE ORAL 2 TIMES DAILY
Qty: 20 CAPSULE | Refills: 0 | Status: SHIPPED | OUTPATIENT
Start: 2022-10-06 | End: 2022-10-16

## 2022-10-06 RX ORDER — METHYLPREDNISOLONE 4 MG/1
TABLET ORAL
Qty: 21 TABLET | Refills: 0 | Status: ON HOLD | OUTPATIENT
Start: 2022-10-06 | End: 2023-01-11 | Stop reason: HOSPADM

## 2022-10-06 RX ORDER — LEFLUNOMIDE 20 MG/1
20 TABLET ORAL DAILY
Qty: 90 TABLET | Refills: 0 | Status: SHIPPED | OUTPATIENT
Start: 2022-10-06 | End: 2022-11-25

## 2022-10-06 RX ORDER — PREDNISONE 1 MG/1
5 TABLET ORAL DAILY
Qty: 450 TABLET | Refills: 1 | Status: SHIPPED | OUTPATIENT
Start: 2022-10-06 | End: 2023-01-31

## 2022-10-06 RX ORDER — SARILUMAB 200 MG/1.14ML
200 INJECTION, SOLUTION SUBCUTANEOUS
Qty: 2.28 ML | Refills: 2 | Status: SHIPPED | OUTPATIENT
Start: 2022-10-06 | End: 2023-01-04 | Stop reason: SDUPTHER

## 2022-10-06 RX ORDER — PREGABALIN 50 MG/1
150 CAPSULE ORAL NIGHTLY
Qty: 270 CAPSULE | Refills: 1 | Status: SHIPPED | OUTPATIENT
Start: 2022-10-06 | End: 2023-05-22 | Stop reason: SDUPTHER

## 2022-10-06 ASSESSMENT — ROUTINE ASSESSMENT OF PATIENT INDEX DATA (RAPID3)
AM STIFFNESS SCORE: 1, YES
PAIN SCORE: 7.5
MDHAQ FUNCTION SCORE: 1.8
PSYCHOLOGICAL DISTRESS SCORE: 2.2
TOTAL RAPID3 SCORE: 7
PATIENT GLOBAL ASSESSMENT SCORE: 7.5
FATIGUE SCORE: 7

## 2022-10-06 NOTE — PROGRESS NOTES
"Subjective:       Patient ID: Joyce Peterson is a 62 y.o. female.    Chief Complaint: Disease Management    Pt is a 61yo female with RA, secondary OA, fibromyalgia and osteoporosis who presents for f/u today. LCV 6/23/22. At that time pt was doing well. All medications were continued as previously prescribed except Lyrica was increased to 100mg nightly. Pt was also referred to PT. Pt also encouraged to f/u with pain management. Pt tolerated increase to Lyrica 100mg without any side effects. Pt states that she did about 3 weeks of PT then her knee had significant swelling which caused her to stop therapy. She followed up with ortho who performed a CSI of her right knee. She states that she still has some right knee pain, but overall it is much improved following the injection. The swelling has also improved. Pain management performed intralaminar L3-4 MADELINE on 9/29. Pt states that she feels moderately better at this time regarding both the achey pain in her lower back and radicular pain radiating into her BLE. She states that this is the best she has felt following any of the interventional pain procedures she has received. Pt states that she just received orders to start PT again for her back and right shoulder. She will be starting therapy within the next 2-3 weeks.     Today pt states that she currently has multiple sores in her mouth, unchanged with LCV. She states she uses multiple mouthwashes, but none of them help with the oral sores. Pt endorses jaw pain that is most prominent when she is chewing food. Pain is worse on the right than the left. Pt also states that she has noticed increasing hair loss lately. She notices the hair loss when she is washing her hair and brushing her hair. She says "handfuls" of hair will fall out at a time. Also endorses increasing fatigue, dry mouth and dry eyes, all of these overall unchanged from LCV. Pt's most significant pain today remains her right shoulder. She states " that she feels this pain most when she is sleeping on her right side and when she reaches for objects above her head. Pt will be starting PT for this within the next month.    Denies lesions/rashes, dysphagia, or appetite change.     Exercise: was doing yoga 3-4 times weekly but last 2 weeks nothing, no kelly chi, stationary bike 3 times weekly for 15 minutes             She reports no joint swelling. Associated symptoms include fatigue and myalgias. Pertinent negatives include no fever, trouble swallowing or headaches.       Review of Systems   Constitutional:  Positive for activity change, fatigue and unexpected weight change. Negative for appetite change and fever.   HENT:  Positive for mouth sores. Negative for hearing loss, sore throat and trouble swallowing.    Eyes:  Negative for visual disturbance.   Respiratory:  Negative for cough, chest tightness, shortness of breath and wheezing.    Cardiovascular:  Negative for chest pain and palpitations.   Gastrointestinal:  Negative for abdominal pain, constipation, diarrhea, nausea and vomiting.   Musculoskeletal:  Positive for arthralgias, back pain and myalgias. Negative for joint swelling.   Skin:  Negative for color change and rash.   Neurological:  Negative for dizziness and headaches.       Objective:   /82   Pulse 99   Wt 65.5 kg (144 lb 6.4 oz)   LMP  (LMP Unknown)   BMI 27.28 kg/m²      Physical Exam   Constitutional: She is oriented to person, place, and time. normal appearance.   HENT:   Head: Normocephalic and atraumatic.   Mouth/Throat: Mucous membranes are dry. Oropharynx is clear.   Eyes: Pupils are equal, round, and reactive to light.   Cardiovascular: Normal rate, regular rhythm, normal heart sounds and normal pulses. Exam reveals no gallop and no friction rub.   No murmur heard.  Pulmonary/Chest: Effort normal and breath sounds normal. No respiratory distress. She has no wheezes.   Abdominal: Soft. She exhibits no distension. There is no  abdominal tenderness.   Musculoskeletal:      Right shoulder: Tenderness present.      Left shoulder: Normal.      Right elbow: Normal.      Left elbow: Normal.      Right knee: Tenderness present.      Left knee: Normal.   Neurological: She is alert and oriented to person, place, and time.   Reflex Scores:       Patellar reflexes are 2+ on the right side and 2+ on the left side.  Skin: Skin is warm and dry.       Right Side Rheumatological Exam     Examination finds the elbow, 1st PIP, 2nd PIP, 2nd MCP, 3rd PIP, 3rd MCP, 4th PIP, 4th MCP, 5th PIP and 5th MCP normal.    The patient is tender to palpation of the shoulder and knee    The patient has an enlarged wrist and 1st MCP    Muscle Strength (0-5 scale):  Deltoid:  5  Biceps: 5/5   Triceps:  5  : 5/5   Iliopsoas: 5  Quadriceps:  5   Distal Lower Extremity: 5    Left Side Rheumatological Exam     Examination finds the shoulder, elbow, knee, 1st PIP, 2nd PIP, 2nd MCP, 3rd PIP, 3rd MCP, 4th PIP, 4th MCP, 5th PIP and 5th MCP normal.    The patient has an enlarged wrist and 1st MCP.    Muscle Strength (0-5 scale):  Deltoid:  5  Biceps: 5/5   Triceps:  5  :  5/5   Iliopsoas: 5  Quadriceps:  5   Distal Lower Extremity: 5          8/23/2021 12/9/2021 6/23/2022 10/6/2022   Tender (GURROLA-28) 2 / 28 24 / 28 1 / 28 1 / 28    Swollen (GURROLA-28) 6 / 28 0 / 28 0 / 28 0 / 28    Provider Global 10 mm 0 mm 10 mm 10 mm   Patient Global 80 mm 90 mm 90 mm 75 mm   ESR 1 mm/hr 1 mm/hr 1 mm/hr 2 mm/hr   CRP 0.1 mg/L 0.3 mg/L 0.3 mg/L 0.3 mg/L   GURROLA-28 (ESR) 2.6 (Low disease activity) 4 (Moderate disease activity) 1.82 (Remission) 2.1 (Remission)   GURROLA-28 (CRP) 3.59 (Moderate disease activity) 5.06 (Moderate disease activity) 2.87 (Low disease activity) 2.66 (Low disease activity)   CDAI Score 17  33  11  9.5         Assessment:       1. Immunosuppressed status    2. Osteoarthritis, unspecified osteoarthritis type, unspecified site    3. Rheumatoid arthritis    4. Hair loss     5. Rheumatoid arthritis, involving unspecified site, unspecified whether rheumatoid factor present    6. Fibromyalgia            Plan:       Problem List Items Addressed This Visit          Orthopedic    Fibromyalgia    Relevant Medications    pregabalin (LYRICA) 50 MG capsule     Other Visit Diagnoses       Immunosuppressed status    -  Primary    Relevant Orders    Influenza (FLUAD) - Quadrivalent (Adjuvanted) *Preferred* (65+) (PF)    Osteoarthritis, unspecified osteoarthritis type, unspecified site        Relevant Medications    diclofenac sodium (VOLTAREN) 1 % Gel    Rheumatoid arthritis        Relevant Medications    leflunomide (ARAVA) 20 MG Tab    Hair loss        Relevant Orders    Ambulatory referral/consult to Dermatology    Rheumatoid arthritis, involving unspecified site, unspecified whether rheumatoid factor present        Relevant Medications    sarilumab (KEVZARA) 200 mg/1.14 mL Syrg          Referral to dermatology for hair loss  Recommend f/u with ortho for right shoulder OA and rotator cuff tendinopathy   Continue sarilumab 200 mg sc q 2 wks  Continue leflunomide 20mg daily  Continue prednisone 5 mg daily  Increase pregabalin to 150mg nightly, can reduce to 100mg if not tolerating well  Continue denosumab 60 mg sc q 6 months next 11/30/22  Continue voltaren gel as needed for generalized OA pains  Recommend flu vaccine and new bivalent Covid vaccine booster today. Then Pneuovax booster. For all: hold leflunomide for 1-2 wks then resume  For foot/ankle surgery: schedule week 3 since last sarilumab dose; resume sarilumab 2 wks post op. Continue leflunomide through surgery. Continue prednisone 5mg daily through surgery. Hold naproxen 1 wk pre-op  Continue home exercises with yoga and stationary bike  Continue to f/u with pain management for chronic low back pain, s/p L3-4 IL MADELINE 9/29  Continue with PT as scheduled  Continue 1200mg dietary calcium daily  RTC 3 months with standing labs    Naveed ESQUIVEL  DANELLE Palafox  PGY-1  [facility] PM&R

## 2022-10-06 NOTE — PROGRESS NOTES
"Chief Complaint   Patient presents with    Follow-up     Still experiencing sores inside of mouth, and swelling in glands below neck (right side)       HPI:  Patient is a 62 y.o. female who has previously seen me for right ear canal lesion.  Pathology results reviewed with patient.      Since the last visit, the patient reports that her ear has not been bothering her.  She does report that an area on the right mandibular gingivolabial sulcus where the patient reports pain, previous "ulcers and blisters" that have since resolved, but she still has pain in the area, pain in the adjacent teeth, and pain radiating down into the submandibular and FOM area on the right.  She reports the pain is worse at night.  There is a h/o TMJ for which she wears a night tooth guard.    Patient does have a history of herpes labialis but no history of zoster.    Pathology 09/13/2022:  Final Pathologic Diagnosis 1. 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/29/2012    Thyroid nodule 08/22/2013    Hyperlipidemia 08/22/2013    Mild intermittent asthma without complication 11/04/2015    Rheumatoid arthritis involving multiple sites 11/16/2016    Low back pain 02/07/2017    Iron deficiency anemia due to chronic blood loss 11/14/2017    Sjogren's syndrome 05/21/2018    Candidal esophagitis 10/15/2018    Coronary artery disease involving native coronary artery of native heart without angina pectoris 07/16/2019    Subclinical hyperthyroidism 02/21/2020    Fibromyalgia 03/12/2020    Dry eyes     Ureterocele     Urge urinary incontinence     Elevated serum GGT level 03/17/2021    Transaminasemia 03/17/2021    Lumbar radiculopathy 07/23/2021    Stress " fracture of left foot 06/28/2022    Arthritis of foot 06/28/2022    Pes planovalgus, acquired, left 06/28/2022    Degenerative joint disease of hindfoot, left 06/28/2022    Decreased strength, endurance, and mobility 07/05/2022    Impaired tolerance of activity 07/05/2022     Resolved Ambulatory Problems     Diagnosis Date Noted    Coronary artery disease 02/06/2013    Posterior tibial tendinitis of left leg 03/08/2013    Lumbar spondylosis 03/08/2013    CS (cervical spondylosis) 03/08/2013    Osteoarthritis of both knees 03/08/2013    Occipital neuralgia 08/13/2014    Osteoarthritis of CMC joint of thumb 11/13/2014    URTI (acute upper respiratory infection) 07/28/2015    Umbilical hernia 08/13/2015    CMC (carpometacarpal) synovitis 10/19/2015    AR (allergic rhinitis) 11/04/2015    Iron deficiency anemia 11/11/2015    Vomiting 11/23/2015    Viral gastroenteritis 11/23/2015    Sepsis 11/23/2015    SIRS due to infectious process with organ dysfunction 11/23/2015    Intractable vomiting with nausea 11/23/2015    Volume depletion, gastrointestinal loss 11/23/2015    Immunosuppressed status 03/03/2016    Lateral meniscus derangement 04/06/2016    Lateral epicondylitis of right elbow 08/10/2016    Pleurisy 11/01/2016    Thoracic back pain 02/07/2017    Carpal tunnel syndrome of right wrist 04/04/2017    Elevated parathyroid hormone 05/10/2017    Low TSH level 05/10/2017    Hypogammaglobulinemia 05/10/2017    Right carpal tunnel syndrome 05/29/2017    Iron deficiency anemia due to chronic blood loss 06/29/2017    Pain in right hand 06/30/2017    Pain in right elbow 06/30/2017    Range of motion deficit 06/30/2017    Decreased  strength of right hand 06/30/2017    Right shoulder pain 08/21/2017    Pure hypercholesterolemia 01/08/2018    Research study patient 04/30/2018    Left elbow pain 06/20/2018    Rupture of left triceps tendon 10/17/2018    Pain in left elbow 11/01/2018    Stiffness of left elbow joint  01/10/2019    Weakness of left arm 01/10/2019    Left hip pain 03/06/2019    Acute hip pain, left 03/20/2019    Gait abnormality 03/20/2019    Greater trochanteric bursitis of left hip 05/13/2019    Stress fracture of neck of left femur 05/20/2019    Left leg weakness 06/06/2019    Impaired functional mobility, balance, gait, and endurance 06/13/2019    Buttock pain 09/27/2019    Difficulty walking 12/05/2019    ROBERT (iron deficiency anemia) 01/24/2020    Recurrent UTI     Atrophic vaginitis     Drug-induced constipation     Dry mouth     Nocturia     Urinary frequency     RADHA (stress urinary incontinence, female)     History of long term use of Methotrexate  03/17/2021    Chronic pain 03/29/2021    Acute pain of left shoulder 04/26/2021    Osteoarthritis of lumbar spine 08/23/2021    Decreased range of motion with decreased strength 10/05/2021    Impaired mobility and activities of daily living 10/05/2021    Decreased strength of lower extremity 10/08/2021    Healthcare maintenance 01/18/2022    Abnormal finding of blood chemistry, unspecified  01/19/2022    History of methotrexate therapy 01/19/2022     Past Medical History:   Diagnosis Date    Acid reflux     Allergy     Anemia     Asthma     Degenerative disc disease     Lobular carcinoma in situ     Osteoarthritis     Osteoporosis     Rheumatoid arthritis(714.0)        Review of Systems  General: negative for chills, fever or weight loss  Psychological: negative for mood changes or depression  Ophthalmic: negative for blurry vision, photophobia or eye pain  ENT: see HPI  Respiratory: no cough, shortness of breath, or wheezing  Cardiovascular: no chest pain or dyspnea on exertion  Gastrointestinal: no abdominal pain, change in bowel habits, or black/ bloody stools  Musculoskeletal: negative for gait disturbance or muscular weakness  Neurological: no syncope or seizures; no ataxia  Dermatological: negative for pruritis, rash and jaundice  Hematologic/lymphatic: no  easy bruising, no new adenopathy    Physical Exam     Vitals:    10/06/22 1343   BP: 126/75   Pulse: 97         Constitutional:   She is oriented to person, place, and time. Vital signs are normal. She appears well-developed and well-nourished. She appears alert. She is cooperative.  Non-toxic appearance. Normal speech.      Head:  Normocephalic and atraumatic. Salivary glands normal.  Submandibular tender (right submand area TTP external and bimanual palpation, no stones noted).  Sublingual tender (TTP on bimanual palpation, no stones noted).  Facial strength is normal.      Ears:  Hearing normal to normal and whispered voice; external ear normal without scars, lesions, or masses; ear canal, tympanic membrane, and middle ear normal., right ear hearing normal to normal and whispered voice; external ear normal without scars, lesions, or masses; ear canal, tympanic membrane, and middle ear normal. and left ear hearing normal to normal and whispered voice; external ear normal without scars, lesions, or masses; ear canal, tympanic membrane, and middle ear normal..   Right Ear: Tympanic membrane is not perforated, not erythematous and not retracted. No middle ear effusion.   Left Ear: Tympanic membrane is not perforated, not erythematous and not retracted.  No middle ear effusion.   Ears:    Area of previous biopsy still present with eschar overlying.  No regrowth of the lesion, and no TTP.  No drainage, erythema, exudate, otorrhea.     Nose:  Mucosal edema present. No rhinorrhea, septal deviation, nasal septal hematoma or polyps. No epistaxis. No turbinate masses and no turbinate hypertrophy (2+ size).  Right sinus exhibits no maxillary sinus tenderness and no frontal sinus tenderness. Left sinus exhibits no maxillary sinus tenderness and no frontal sinus tenderness.     Mouth/Throat  Oropharynx clear and moist without lesions or asymmetry, normal uvula midline, lips, teeth, and gums normal and oropharynx normal. Normal  dentition. No uvula swelling or oral lesions. No oropharyngeal exudate, posterior oropharyngeal edema or posterior oropharyngeal erythema. +2.  Tonsils not present, tonsillar erythema, tonsillar hyperemia, tonsillar exudate.  Mirror exam not performed due to patient tolerance.  Mirror exam not performed due to patient tolerance.    No lesions noted on gingiva or any mucosal areas of mouth.  TTP over mandibular gingiva on right and around molars.      Good salivary flow and no purulent drainage from [place]'s or Stensen's ducts bilaterally.      Neck:  Neck normal without thyromegaly masses, asymmetry, normal tracheal structure, crepitus, and tenderness, thyroid normal, trachea normal, full range of motion with neck supple and no adenopathy. No JVD present. Carotid bruit is not present. Thyroid tenderness is present. No edema and no erythema present. No thyroid mass and no thyromegaly present.     She has no cervical adenopathy.     Cardiovascular:    Normal rate, regular rhythm, normal heart sounds and rate and rhythm, heart sounds, and pulses normal.              Pulmonary/Chest:   Effort and breath sounds normal.     Psychiatric:   She has a normal mood and affect. Her speech is normal and behavior is normal.     Neurological:   She is alert and oriented to person, place, and time. She has neurological normal, alert and oriented. No cranial nerve deficit.     Skin:   No abrasions, lacerations, lesions, or rashes.       Assessment/Plan:      ICD-10-CM ICD-9-CM    1. Lesion of external ear canal, right  H61.91 380.9       2. Chronic eczematous otitis externa of both ears  H60.8X3 380.23       3. Oral pain  K13.79 528.9       4. Submandibular gland tenderness  K11.8 527.8       5. Sialoadenitis of sublingual gland  K11.20 527.2       6. Sialoadenitis of submandibular gland  K11.20 527.2             Will try a course of clindamycin and a MDP to see if that helps salivary gland symptoms.  She will perform sialogogues,  massage, heat, and hydration for the gland as well.    If symptoms do not resolve, may order MRI to image the area, refer to OMFS for their evaluation, and consider treatment with antiviral.      Continue ointment to right ear.  Will monitor this benign lesion for now, as it's not causing any problems.      Patient will let me know if symptoms are not resolving.       Love Whaley MD  [facility]

## 2022-10-06 NOTE — PROGRESS NOTES
I have personally taken the history and examined the patient and agree with the resident,s note as stated above          erosive RA discontinued long term methotrexate b/o nausea with both po and sc and reduced dose, allergic to sulfa, and had reaction to hydroxychloroquine;  Has failed 3 TNFi(infliximab, etanercept, adalimumab), abatacept, hypogamma with rituximab and gets frequent infections in any event, acute vertigo/dizziness with tocilizumab x2  Failed tofacitinib  Currently on Sarilumab   Future option would be  changing sarilumab to  upadacitinib              RA  TJ 1  SJ 0  Pt global 75 ESR 2 CRP <0.3 DAS28 2.1 (remission)  REL56-VEO 2.66 (DMA)  CDAI) 9.5 (LDA)but all driven by low back pain, neck pain, fibromyalgia, right shoulder pain, OA right knee  Rheumatoid Arthritis Treatment Goals:  -Disease Activity Measure:CDAI   -Target: LDA  -Patient Goal:  -Therapy/Change: none, activity scores driven by back symptoms, neck symptoms, fibromyalgia , right shoulder, OA right knee, not RA. No need to change sarilumab to upadacitinib  -Shared Decision Making: Discussed goals of care and therapy plan together with patient as outlined above.      Osteoporosis DXA 3/8/22: normal with FRAX : hip 1.6% major 23%  : completed 2 years of teriparatide added to denosumab, now denosumab alone  Cont denosumab 60mg sc q  6months(since 2017); teriparatide 1/2020 -1/2022  Saw Dr. Lane Endo 1/5/22: cont denosumab, most recent dose 5/30/22, next  11/30/22 could potentially resume teriparatide or switch to romosozumab if fractures on denosumab.  Fibromyalgia: intolerant of duloxetine, gabapentin. pregabalin 50mg nightly helps  Subclinical hyperthyroidism, thyroid nodule: Dr. Lane  TSH 1.2  11/17/21  Lumbar spondylosis, had LESI L3-4 9/29/22, RFA left femoral and obturator 7/25/22, RFA right L3,4,5 5/12/22, RFA left L3,4,5 4/21/22  Hyperlipidemia LDL 81.6 10/3/22 on rosuvastatin 20mg nightly  /82  OA right knee  OA right  ACJ        *Fluad, new bivalent Covid vaccine booster today. Then Pneuovax booster : for all: hold leflunomide for 1-2 wks then resume  For foot/ankle surgery: schedule week 3 since last sarilumab dose; resume sarilumab 2 wks post op. Continue leflunomide through surgery. Continue prednisone 5mg daily through surgery. Hold naproxen 1 wk pre-op  Rodrigo Chi for fibromyalgia and OA knees and hips  Try increasing pregabalin to 150mg nightly if tolerated, if not reduce back to 50mg nightly  sarilumab 200 mg sc q 2 wks  leflunomide 20mg daily  prednisone  5 mg daily  pregabalin 50mg nightly, sleeping better spirits better   continue yoga on [employer]  Aerobic exercises stationary bike  F/u Pain Management for neck and back  Continue denosumab 60 mg sc q 6 months next 11/30/22  1200 mg dietary calcium daily  Ref to PT for right shoulder and right knee   RTC 3 months with standing labs, lipid panel, vitamin

## 2022-10-06 NOTE — PATIENT INSTRUCTIONS
*Fluad, new bivalent Covid vaccine booster today. Then Pneumovax booster : for all: hold leflunomide and Kevzara for 1-2 wks then resume  For foot/ankle surgery: schedule week 3 since last sarilumab dose; resume sarilumab 2 wks post op. Continue leflunomide through surgery. Continue prednisone 5mg daily through surgery. Hold naproxen 1 wk pre-op

## 2022-10-06 NOTE — PATIENT INSTRUCTIONS
Complete the clindamycin and MDP.  Patient will give me update in one week on how symptoms are progressing.    Will consider ordering MRI of neck if symptoms not responding to antibiotics.   Also may consider round of valtrex and evaluation by oral surgeon if these do not help or show diagnosis.      Continue ointment to right ear- will monitor benign lesion of the ear canal.      Stay well hydrated.    Get some sugar free lemondrops or other sour candy and chew at least one per hour. This increases saliva flow in the affected gland.    Massage your left cheek gland from the ear lobe to the mouth several times per day - this pushes the saliva forward.    Place a heating pad or other warm compress on the affected area three times a day for about 10-15 minutes.

## 2022-10-10 ENCOUNTER — SPECIALTY PHARMACY (OUTPATIENT)
Dept: PHARMACY | Facility: CLINIC | Age: 62
End: 2022-10-10
Payer: MEDICARE

## 2022-10-10 DIAGNOSIS — M05.79 RHEUMATOID ARTHRITIS INVOLVING MULTIPLE SITES WITH POSITIVE RHEUMATOID FACTOR: Primary | ICD-10-CM
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
## 2022-10-10 NOTE — ASSESSMENT & PLAN NOTE
- Intermittent subclinical disease for several years with recent values normal  - Previous imaging consistent with Graves' disease    - Repeat TSH now and, if normal, then annually for surveillance  - Low threshold to treat given osteoporosis and multiple fractures

## 2022-10-10 NOTE — TELEPHONE ENCOUNTER
Specialty Pharmacy - Refill Coordination    Specialty Medication Orders Linked to Encounter      Flowsheet Row Most Recent Value   Medication #1 sarilumab (KEVZARA) 200 mg/1.14 mL Syrg (Order#[id number], Rx#[id number])            Refill Questions - Documented Responses      Flowsheet Row Most Recent Value   Patient Availability and HIPAA Verification    Does patient want to proceed with activity? Yes   HIPAA/medical authority confirmed? Yes   Relationship to patient of person spoken to? Self   Refill Screening Questions    Changes to allergies? No   Changes to medications? Yes  [clindamycin for mouth sores-providers awaye. Pt holding kevzara until thurs as advised by rheum]   New conditions since last clinic visit? No   Unplanned office visit, urgent care, ED, or hospital admission in the last 4 weeks? No   How does patient/caregiver feel medication is working? Good   Financial problems or insurance changes? No   How many doses of your specialty medications were missed in the last 4 weeks? 1   Why were doses missed? Felt ill or sick   Would patient like to speak to a pharmacist? No   When does the patient need to receive the medication? 10/13/22   Refill Delivery Questions    How will the patient receive the medication? [facility]   When does the patient need to receive the medication? 10/13/22   Shipping Address Home   Address in [facility] confirmed and updated if neccessary? Yes   Expected Copay ($) 55   Is the patient able to afford the medication copay? Yes   Payment Method CC on file   Days supply of Refill 28   Supplies needed? No supplies needed   Refill activity completed? Yes   Refill activity plan Refill scheduled   Shipment/Pickup Date: 10/10/22            Current Outpatient Medications   Medication Sig    acyclovir (ZOVIRAX) 200 MG capsule Take 1 capsule (200 mg total) by mouth 5 (five) times daily. (Patient not taking: No sig reported)    albuterol (PROVENTIL/VENTOLIN HFA) 90 mcg/actuation inhaler  INHALE 2 PUFFS INTO THE LUNGS EVERY 6 (SIX) HOURS AS NEEDED. RESCUE    albuterol-ipratropium (DUO-NEB) 2.5 mg-0.5 mg/3 mL nebulizer solution Take 3 mLs by nebulization every 6 (six) hours as needed for Wheezing. Rescue    ALPRAZolam (XANAX) 0.5 MG tablet Take Once on call to procedure    aspirin 81 mg Tab Take 81 mg by mouth every morning.     budesonide-formoterol 160-4.5 mcg (SYMBICORT) 160-4.5 mcg/actuation HFAA INHALE 2 PUFFS INTO THE LUNGS EVERY 12 (TWELVE) HOURS.    calcium citrate-vitamin D3 315-200 mg (CITRACAL+D) 315 mg-5 mcg (200 unit) per tablet Take 1 tablet by mouth 2 (two) times daily.     clindamycin (CLEOCIN) 300 MG capsule Take 1 capsule (300 mg total) by mouth 2 (two) times a day. for 10 days    cycloSPORINE (RESTASIS) 0.05 % ophthalmic emulsion Instill one drop into both eyes two times per day    diclofenac sodium (VOLTAREN) 1 % Gel APPLY 2 GRAMS TOPICALLY 4 (FOUR) TIMES DAILY AS NEEDED.  Strength: 1 %    estrogens, conjugated, (PREMARIN) 0.625 MG tablet take 1 tablet by mouth daily    fluconazole (DIFLUCAN) 100 MG tablet Take 1 tablet by mouth once daily for 14 days.    halobetasol propion-tazarotene (DUOBRII) 0.01-0.045 % Lotn aaa on feet and hands qhs  then vaseline and warm towel (Patient taking differently: aaa on feet and hands qhs  then vaseline and warm towel)    ketoconazole (NIZORAL) 2 % cream Apply to feet twice daily for 3 weeks (Patient taking differently: Apply to feet twice daily for 3 weeks)    leflunomide (ARAVA) 20 MG Tab Take 1 tablet (20 mg total) by mouth once daily.    lifitegrast (XIIDRA) 5 % Dpet INSTILL ONE DROP INTO BOTH EYES TWICE A DAY    magic mouthwash diphen/antac/lidoc Swish and Spit 5 mL by mouth for 30 seconds twice daily.    methenamine (HIPREX) 1 gram Tab Take 1 tablet (1 g total) by mouth 2 (two) times daily.    methylPREDNISolone (MEDROL DOSEPACK) 4 mg tablet use as directed on package    mirabegron (MYRBETRIQ) 25 mg Tb24 ER tablet Take 1 tablet (25 mg total)  by mouth once daily.    montelukast (SINGULAIR) 10 mg tablet TAKE 1 TABLET BY MOUTH EVERY DAY AT NIGHT    naproxen (NAPROSYN) 500 MG tablet Take 1 tablet (500 mg total) by mouth 2 (two) times daily as needed.    omeprazole (PRILOSEC) 40 MG capsule Take 1 capsule (40 mg total) by mouth every morning.    omeprazole-sodium bicarbonate (ZEGERID) 40-1.1 mg-gram per capsule TAKE 1 CAPSULE BY MOUTH EVERY DAY IN THE MORNING    POTASSIUM ORAL Take by mouth once daily.    predniSONE (DELTASONE) 1 MG tablet Take 5 tablets (5 mg total) by mouth once daily.    pregabalin (LYRICA) 50 MG capsule Take 3 capsules (150 mg total) by mouth every evening.    PREMARIN vaginal cream PLACE 0.5 GRAM VAGINALLY 3 (THREE) TIMES A WEEK.    progesterone (PROMETRIUM) 100 MG capsule Take 1 capsule by mouth daily.    progesterone (PROMETRIUM) 100 MG capsule take 1 capsule by mouth daily    promethazine (PHENERGAN) 25 MG tablet Take 1 tablet (25 mg total) by mouth every 6 (six) hours as needed for Nausea.    rosuvastatin (CRESTOR) 20 MG tablet Take 1 tablet (20 mg total) by mouth every evening.    sarilumab (KEVZARA) 200 mg/1.14 mL Syrg Inject 1.14 mL (200 mg total) into the skin every 14 (fourteen) days.    sucralfate (CARAFATE) 1 gram tablet TAKE 1 TABLET (1 G TOTAL) BY MOUTH 4 (FOUR) TIMES DAILY.    sucralfate (CARAFATE) 1 gram tablet Take 1 tablet (1 g total) by mouth 4 (four) times daily.    traMADoL (ULTRAM) 50 mg tablet TAKE 1 TABLET (50 MG TOTAL) BY MOUTH EVERY 12 (TWELVE) HOURS AS NEEDED FOR PAIN.    triamcinolone acetonide 0.1% (KENALOG) 0.1 % paste Place onto teeth 2 (two) times daily.   Last reviewed on 10/6/2022  1:44 PM by Fadia Shaffer LPN    Review of patient's allergies indicates:   Allergen Reactions    Actemra [tocilizumab] Other (See Comments)     Severe dizziness    Codeine Nausea And Vomiting and Hives    Gold au 198 Hives and Rash    Hydroxychloroquine Other (See Comments)     Can't remember the reaction      Iodinated  "contrast media Other (See Comments)     Other reaction(s): BURNING ALL OVER    Iodine Other (See Comments) and Hives     Other reaction(s): BURNING ALL OVER - Iodine dye - Not topical    Ondansetron Nausea And Vomiting    Sulfa (sulfonamide antibiotics) Other (See Comments)     Can't remember the reaction    Zofran [ondansetron hcl (pf)] Nausea And Vomiting     Pt reports that last time she received zofran she started vomiting again    Methotrexate analogues Nausea Only    Pneumococcal vaccine Other (See Comments)    Pneumovax 23 [pneumococcal 23-argenis ps vaccine] Other (See Comments)     "sick"    Methotrexate Nausea Only    Last reviewed on  10/6/2022 1:44 PM by Fadia Shaffer      Tasks added this encounter   11/3/2022 - Refill Call (Auto Added)   Tasks due within next 3 months   No tasks due.     Amberly Dominguez, PharmD  [facility]  [address]  [place] [facility] [place] [address]  Phone: [phone]  Fax: [phone]        "
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
## 2022-10-11 ENCOUNTER — OFFICE VISIT (OUTPATIENT)
Dept: ENDOCRINOLOGY | Facility: CLINIC | Age: 62
End: 2022-10-11
Payer: MEDICARE

## 2022-10-11 DIAGNOSIS — M81.8 STEROID-INDUCED OSTEOPOROSIS: Chronic | ICD-10-CM

## 2022-10-11 DIAGNOSIS — E05.90 SUBCLINICAL HYPERTHYROIDISM: ICD-10-CM

## 2022-10-11 DIAGNOSIS — M05.79 RHEUMATOID ARTHRITIS INVOLVING MULTIPLE SITES WITH POSITIVE RHEUMATOID FACTOR: Chronic | ICD-10-CM

## 2022-10-11 DIAGNOSIS — E04.1 THYROID NODULE: Chronic | ICD-10-CM

## 2022-10-11 DIAGNOSIS — T38.0X5A STEROID-INDUCED OSTEOPOROSIS: Chronic | ICD-10-CM

## 2022-10-11 PROCEDURE — 99214 OFFICE O/P EST MOD 30 MIN: CPT | Mod: 95,GC,, | Performed by: STUDENT IN AN ORGANIZED HEALTH CARE EDUCATION/TRAINING PROGRAM

## 2022-10-11 PROCEDURE — 3044F PR MOST RECENT HEMOGLOBIN A1C LEVEL <7.0%: ICD-10-PCS | Mod: CPTII,95,GC, | Performed by: STUDENT IN AN ORGANIZED HEALTH CARE EDUCATION/TRAINING PROGRAM

## 2022-10-11 PROCEDURE — 1159F MED LIST DOCD IN RCRD: CPT | Mod: CPTII,95,GC, | Performed by: STUDENT IN AN ORGANIZED HEALTH CARE EDUCATION/TRAINING PROGRAM

## 2022-10-11 PROCEDURE — 99214 PR OFFICE/OUTPT VISIT, EST, LEVL IV, 30-39 MIN: ICD-10-PCS | Mod: 95,GC,, | Performed by: STUDENT IN AN ORGANIZED HEALTH CARE EDUCATION/TRAINING PROGRAM

## 2022-10-11 PROCEDURE — 1160F PR REVIEW ALL MEDS BY PRESCRIBER/CLIN PHARMACIST DOCUMENTED: ICD-10-PCS | Mod: CPTII,95,GC, | Performed by: STUDENT IN AN ORGANIZED HEALTH CARE EDUCATION/TRAINING PROGRAM

## 2022-10-11 PROCEDURE — 1160F RVW MEDS BY RX/DR IN RCRD: CPT | Mod: CPTII,95,GC, | Performed by: STUDENT IN AN ORGANIZED HEALTH CARE EDUCATION/TRAINING PROGRAM

## 2022-10-11 PROCEDURE — 3044F HG A1C LEVEL LT 7.0%: CPT | Mod: CPTII,95,GC, | Performed by: STUDENT IN AN ORGANIZED HEALTH CARE EDUCATION/TRAINING PROGRAM

## 2022-10-11 PROCEDURE — 1159F PR MEDICATION LIST DOCUMENTED IN MEDICAL RECORD: ICD-10-PCS | Mod: CPTII,95,GC, | Performed by: STUDENT IN AN ORGANIZED HEALTH CARE EDUCATION/TRAINING PROGRAM

## 2022-10-11 NOTE — PATIENT INSTRUCTIONS
- Obtain TSH and free T4 with next lab draw    - Repeat thyroid ultrasound in May 2023    - Continue treatment with Prolia for the time being, if additional fractures occur, may discuss treatment with Evenity  - Repeat DEXA in March 2023

## 2022-10-18 ENCOUNTER — PATIENT MESSAGE (OUTPATIENT)
Dept: OPTOMETRY | Facility: CLINIC | Age: 62
End: 2022-10-18
Payer: MEDICARE

## 2022-10-18 RX ORDER — FLUCONAZOLE 100 MG/1
TABLET ORAL
Qty: 14 TABLET | Refills: 0 | Status: ON HOLD | OUTPATIENT
Start: 2022-10-18 | End: 2023-01-11 | Stop reason: HOSPADM

## 2022-10-19 DIAGNOSIS — H04.123 BILATERAL DRY EYES: Primary | ICD-10-CM

## 2022-10-19 RX ORDER — CYCLOSPORINE 0.5 MG/ML
EMULSION OPHTHALMIC
Qty: 180 EACH | Refills: 3 | Status: SHIPPED | OUTPATIENT
Start: 2022-10-19 | End: 2023-06-13

## 2022-10-20 ENCOUNTER — TELEPHONE (OUTPATIENT)
Dept: OPHTHALMOLOGY | Facility: CLINIC | Age: 62
End: 2022-10-20
Payer: MEDICARE

## 2022-10-20 DIAGNOSIS — Z00.6 PATIENT IN CLINICAL RESEARCH STUDY: ICD-10-CM

## 2022-10-20 DIAGNOSIS — K31.84 GASTROPARESIS: Primary | ICD-10-CM

## 2022-11-02 ENCOUNTER — PATIENT MESSAGE (OUTPATIENT)
Dept: ORTHOPEDICS | Facility: CLINIC | Age: 62
End: 2022-11-02
Payer: MEDICARE

## 2022-11-02 ENCOUNTER — PATIENT MESSAGE (OUTPATIENT)
Dept: DERMATOLOGY | Facility: CLINIC | Age: 62
End: 2022-11-02
Payer: MEDICARE

## 2022-11-07 ENCOUNTER — PATIENT MESSAGE (OUTPATIENT)
Dept: PAIN MEDICINE | Facility: CLINIC | Age: 62
End: 2022-11-07
Payer: MEDICARE

## 2022-11-08 ENCOUNTER — OFFICE VISIT (OUTPATIENT)
Dept: ORTHOPEDICS | Facility: CLINIC | Age: 62
End: 2022-11-08
Payer: MEDICARE

## 2022-11-08 VITALS
HEIGHT: 61 IN | SYSTOLIC BLOOD PRESSURE: 151 MMHG | BODY MASS INDEX: 26.81 KG/M2 | HEART RATE: 96 BPM | DIASTOLIC BLOOD PRESSURE: 88 MMHG | WEIGHT: 142 LBS

## 2022-11-08 DIAGNOSIS — M25.511 CHRONIC RIGHT SHOULDER PAIN: Primary | ICD-10-CM

## 2022-11-08 DIAGNOSIS — G89.29 CHRONIC RIGHT SHOULDER PAIN: Primary | ICD-10-CM

## 2022-11-08 PROCEDURE — 3008F PR BODY MASS INDEX (BMI) DOCUMENTED: ICD-10-PCS | Mod: CPTII,S$GLB,, | Performed by: ORTHOPAEDIC SURGERY

## 2022-11-08 PROCEDURE — 3044F HG A1C LEVEL LT 7.0%: CPT | Mod: CPTII,S$GLB,, | Performed by: ORTHOPAEDIC SURGERY

## 2022-11-08 PROCEDURE — 3079F DIAST BP 80-89 MM HG: CPT | Mod: CPTII,S$GLB,, | Performed by: ORTHOPAEDIC SURGERY

## 2022-11-08 PROCEDURE — 99214 PR OFFICE/OUTPT VISIT, EST, LEVL IV, 30-39 MIN: ICD-10-PCS | Mod: S$GLB,,, | Performed by: ORTHOPAEDIC SURGERY

## 2022-11-08 PROCEDURE — 99999 PR PBB SHADOW E&M-EST. PATIENT-LVL IV: ICD-10-PCS | Mod: PBBFAC,,, | Performed by: ORTHOPAEDIC SURGERY

## 2022-11-08 PROCEDURE — 3044F PR MOST RECENT HEMOGLOBIN A1C LEVEL <7.0%: ICD-10-PCS | Mod: CPTII,S$GLB,, | Performed by: ORTHOPAEDIC SURGERY

## 2022-11-08 PROCEDURE — 99999 PR PBB SHADOW E&M-EST. PATIENT-LVL IV: CPT | Mod: PBBFAC,,, | Performed by: ORTHOPAEDIC SURGERY

## 2022-11-08 PROCEDURE — 3077F PR MOST RECENT SYSTOLIC BLOOD PRESSURE >= 140 MM HG: ICD-10-PCS | Mod: CPTII,S$GLB,, | Performed by: ORTHOPAEDIC SURGERY

## 2022-11-08 PROCEDURE — 3079F PR MOST RECENT DIASTOLIC BLOOD PRESSURE 80-89 MM HG: ICD-10-PCS | Mod: CPTII,S$GLB,, | Performed by: ORTHOPAEDIC SURGERY

## 2022-11-08 PROCEDURE — 3077F SYST BP >= 140 MM HG: CPT | Mod: CPTII,S$GLB,, | Performed by: ORTHOPAEDIC SURGERY

## 2022-11-08 PROCEDURE — 99214 OFFICE O/P EST MOD 30 MIN: CPT | Mod: S$GLB,,, | Performed by: ORTHOPAEDIC SURGERY

## 2022-11-08 PROCEDURE — 3008F BODY MASS INDEX DOCD: CPT | Mod: CPTII,S$GLB,, | Performed by: ORTHOPAEDIC SURGERY

## 2022-11-08 PROCEDURE — 1159F MED LIST DOCD IN RCRD: CPT | Mod: CPTII,S$GLB,, | Performed by: ORTHOPAEDIC SURGERY

## 2022-11-08 PROCEDURE — 1159F PR MEDICATION LIST DOCUMENTED IN MEDICAL RECORD: ICD-10-PCS | Mod: CPTII,S$GLB,, | Performed by: ORTHOPAEDIC SURGERY

## 2022-11-08 RX ORDER — DIAZEPAM 10 MG/1
10 TABLET ORAL ONCE AS NEEDED
Qty: 1 TABLET | Refills: 0 | Status: SHIPPED | OUTPATIENT
Start: 2022-11-08 | End: 2022-11-08
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
## 2022-11-09 ENCOUNTER — TELEPHONE (OUTPATIENT)
Dept: PAIN MEDICINE | Facility: CLINIC | Age: 62
End: 2022-11-09
Payer: MEDICARE

## 2022-11-09 ENCOUNTER — SPECIALTY PHARMACY (OUTPATIENT)
Dept: PHARMACY | Facility: CLINIC | Age: 62
End: 2022-11-09
Payer: MEDICARE

## 2022-11-09 DIAGNOSIS — M05.79 RHEUMATOID ARTHRITIS INVOLVING MULTIPLE SITES WITH POSITIVE RHEUMATOID FACTOR: Primary | ICD-10-CM

## 2022-11-09 NOTE — TELEPHONE ENCOUNTER
Incoming call regarding Kevzara refill. Pt stated that she is due to inject on 11/14/22 and pt stated that she started taking Omncef for sinus infection. Her last dose of the Cefdir will be on 11/13/22. Transferred to [place]

## 2022-11-09 NOTE — TELEPHONE ENCOUNTER
Specialty Pharmacy - Refill Coordination    Specialty Medication Orders Linked to Encounter      Flowsheet Row Most Recent Value   Medication #1 sarilumab (KEVZARA) 200 mg/1.14 mL Syrg (Order#[id number], Rx#[id number])        ER for sinus infection pt reports s/sx of infection resolved. No missed Kevzara doses. Pt advised to proceed with Kevzara injection 11/14. No further ivent necessary     Refill Questions - Documented Responses      Flowsheet Row Most Recent Value   Patient Availability and HIPAA Verification    Does patient want to proceed with activity? Yes   HIPAA/medical authority confirmed? Yes   Relationship to patient of person spoken to? Self   Refill Screening Questions    Changes to allergies? No   Changes to medications? Yes   New conditions since last clinic visit? No   Unplanned office visit, urgent care, ED, or hospital admission in the last 4 weeks? Yes   How does patient/caregiver feel medication is working? Good   Financial problems or insurance changes? No   How many doses of your specialty medications were missed in the last 4 weeks? 0   Would patient like to speak to a pharmacist? Yes   When does the patient need to receive the medication? 11/14/22   Refill Delivery Questions    How will the patient receive the medication? [facility]   When does the patient need to receive the medication? 11/14/22   Shipping Address Home   Address in [facility] confirmed and updated if neccessary? Yes   Expected Copay ($) 55   Is the patient able to afford the medication copay? Yes   Payment Method CC on file   Days supply of Refill 28   Supplies needed? No supplies needed   Refill activity completed? Yes   Refill activity plan Refill scheduled   Shipment/Pickup Date: 11/10/22            Current Outpatient Medications   Medication Sig    albuterol (PROVENTIL/VENTOLIN HFA) 90 mcg/actuation inhaler INHALE 2 PUFFS INTO THE LUNGS EVERY 6 (SIX) HOURS AS NEEDED. RESCUE    albuterol-ipratropium (DUO-NEB) 2.5  mg-0.5 mg/3 mL nebulizer solution Take 3 mLs by nebulization every 6 (six) hours as needed for Wheezing. Rescue    ALPRAZolam (XANAX) 0.5 MG tablet Take Once on call to procedure    aspirin 81 mg Tab Take 81 mg by mouth every morning.     budesonide-formoterol 160-4.5 mcg (SYMBICORT) 160-4.5 mcg/actuation HFAA INHALE 2 PUFFS INTO THE LUNGS EVERY 12 (TWELVE) HOURS.    calcium citrate-vitamin D3 315-200 mg (CITRACAL+D) 315 mg-5 mcg (200 unit) per tablet Take 1 tablet by mouth 2 (two) times daily.     cycloSPORINE (RESTASIS) 0.05 % ophthalmic emulsion Instill one drop into both eyes two times per day    diazePAM (VALIUM) 10 MG Tab Take 1 tablet (10 mg total) by mouth once as needed.    diclofenac sodium (VOLTAREN) 1 % Gel APPLY 2 GRAMS TOPICALLY 4 (FOUR) TIMES DAILY AS NEEDED.  Strength: 1 %    estrogens, conjugated, (PREMARIN) 0.625 MG tablet take 1 tablet by mouth daily    fluconazole (DIFLUCAN) 100 MG tablet Take 1 tablet by mouth once daily for 14 days.    halobetasol propion-tazarotene (DUOBRII) 0.01-0.045 % Lotn aaa on feet and hands qhs  then vaseline and warm towel (Patient taking differently: aaa on feet and hands qhs  then vaseline and warm towel)    INV PROPULSID 10 MG Take 2 tablets (20 mg) by mouth 4 (four) times daily. FOR INVESTIGATIONAL USE ONLY. Protocol [id number]  Subject ID: [id number].    ketoconazole (NIZORAL) 2 % cream Apply to feet twice daily for 3 weeks (Patient taking differently: Apply to feet twice daily for 3 weeks)    leflunomide (ARAVA) 20 MG Tab Take 1 tablet (20 mg total) by mouth once daily.    lifitegrast (XIIDRA) 5 % Dpet INSTILL ONE DROP INTO BOTH EYES TWICE A DAY    magic mouthwash diphen/antac/lidoc Swish and Spit 5 mL by mouth for 30 seconds twice daily.    methenamine (HIPREX) 1 gram Tab Take 1 tablet (1 g total) by mouth 2 (two) times daily.    methylPREDNISolone (MEDROL DOSEPACK) 4 mg tablet use as directed on package    mirabegron (MYRBETRIQ) 25 mg Tb24 ER tablet Take 1 tablet  (25 mg total) by mouth once daily.    montelukast (SINGULAIR) 10 mg tablet TAKE 1 TABLET BY MOUTH EVERY DAY AT NIGHT    naproxen (NAPROSYN) 500 MG tablet Take 1 tablet (500 mg total) by mouth 2 (two) times daily as needed.    omeprazole (PRILOSEC) 40 MG capsule Take 1 capsule (40 mg total) by mouth every morning.    omeprazole-sodium bicarbonate (ZEGERID) 40-1.1 mg-gram per capsule TAKE 1 CAPSULE BY MOUTH EVERY DAY IN THE MORNING    POTASSIUM ORAL Take by mouth once daily.    predniSONE (DELTASONE) 1 MG tablet Take 5 tablets (5 mg total) by mouth once daily.    pregabalin (LYRICA) 50 MG capsule Take 3 capsules (150 mg total) by mouth every evening.    PREMARIN vaginal cream PLACE 0.5 GRAM VAGINALLY 3 (THREE) TIMES A WEEK.    progesterone (PROMETRIUM) 100 MG capsule Take 1 capsule by mouth daily.    progesterone (PROMETRIUM) 100 MG capsule take 1 capsule by mouth daily    promethazine (PHENERGAN) 25 MG tablet Take 1 tablet (25 mg total) by mouth every 6 (six) hours as needed for Nausea.    rosuvastatin (CRESTOR) 20 MG tablet Take 1 tablet (20 mg total) by mouth every evening.    sarilumab (KEVZARA) 200 mg/1.14 mL Syrg Inject 1.14 mL (200 mg total) into the skin every 14 (fourteen) days.    sucralfate (CARAFATE) 1 gram tablet TAKE 1 TABLET (1 G TOTAL) BY MOUTH 4 (FOUR) TIMES DAILY.    sucralfate (CARAFATE) 1 gram tablet Take 1 tablet (1 g total) by mouth 4 (four) times daily.    traMADoL (ULTRAM) 50 mg tablet TAKE 1 TABLET (50 MG TOTAL) BY MOUTH EVERY 12 (TWELVE) HOURS AS NEEDED FOR PAIN.   Last reviewed on 11/8/2022  3:31 PM by Krystal Horton    Review of patient's allergies indicates:   Allergen Reactions    Actemra [tocilizumab] Other (See Comments)     Severe dizziness    Codeine Nausea And Vomiting and Hives    Gold au 198 Hives and Rash    Hydroxychloroquine Other (See Comments)     Can't remember the reaction      Iodinated contrast media Other (See Comments)     Other reaction(s): BURNING ALL OVER    Iodine  "Other (See Comments) and Hives     Other reaction(s): BURNING ALL OVER - Iodine dye - Not topical    Ondansetron Nausea And Vomiting    Sulfa (sulfonamide antibiotics) Other (See Comments)     Can't remember the reaction    Zofran [ondansetron hcl (pf)] Nausea And Vomiting     Pt reports that last time she received zofran she started vomiting again    Methotrexate analogues Nausea Only    Pneumococcal vaccine Other (See Comments)    Pneumovax 23 [pneumococcal 23-argenis ps vaccine] Other (See Comments)     "sick"    Methotrexate Nausea Only    Last reviewed on  11/8/2022 3:31 PM by Krystal Horton      Tasks added this encounter   12/5/2022 - Refill Call (Auto Added)   Tasks due within next 3 months   No tasks due.     Amberly Dominguez, PharmD  [facility]  [address] [place] [id number]  Phone: [phone]  Fax: [phone]        " Dressing: gelfoam and pressure dressing with Telfa

## 2022-11-10 ENCOUNTER — PATIENT MESSAGE (OUTPATIENT)
Dept: ORTHOPEDICS | Facility: CLINIC | Age: 62
End: 2022-11-10

## 2022-11-10 ENCOUNTER — OFFICE VISIT (OUTPATIENT)
Dept: ORTHOPEDICS | Facility: CLINIC | Age: 62
End: 2022-11-10
Payer: MEDICARE

## 2022-11-10 ENCOUNTER — PATIENT MESSAGE (OUTPATIENT)
Dept: PAIN MEDICINE | Facility: OTHER | Age: 62
End: 2022-11-10
Payer: MEDICARE

## 2022-11-10 ENCOUNTER — OFFICE VISIT (OUTPATIENT)
Dept: PAIN MEDICINE | Facility: CLINIC | Age: 62
End: 2022-11-10
Payer: MEDICARE

## 2022-11-10 ENCOUNTER — TELEPHONE (OUTPATIENT)
Dept: ORTHOPEDICS | Facility: CLINIC | Age: 62
End: 2022-11-10
Payer: MEDICARE

## 2022-11-10 ENCOUNTER — PATIENT MESSAGE (OUTPATIENT)
Dept: GASTROENTEROLOGY | Facility: CLINIC | Age: 62
End: 2022-11-10
Payer: MEDICARE

## 2022-11-10 DIAGNOSIS — M19.072 DEGENERATIVE JOINT DISEASE OF HINDFOOT, LEFT: ICD-10-CM

## 2022-11-10 DIAGNOSIS — M24.572 EQUINUS CONTRACTURE OF LEFT ANKLE: ICD-10-CM

## 2022-11-10 DIAGNOSIS — G89.29 OTHER CHRONIC PAIN: ICD-10-CM

## 2022-11-10 DIAGNOSIS — M79.89 LEFT LEG SWELLING: Primary | ICD-10-CM

## 2022-11-10 DIAGNOSIS — M21.42 PES PLANOVALGUS, ACQUIRED, LEFT: ICD-10-CM

## 2022-11-10 DIAGNOSIS — M47.816 LUMBAR SPONDYLOSIS: ICD-10-CM

## 2022-11-10 DIAGNOSIS — M79.18 MYOFASCIAL PAIN: ICD-10-CM

## 2022-11-10 DIAGNOSIS — M54.16 LUMBAR RADICULOPATHY: Primary | ICD-10-CM

## 2022-11-10 PROCEDURE — 1160F PR REVIEW ALL MEDS BY PRESCRIBER/CLIN PHARMACIST DOCUMENTED: ICD-10-PCS | Mod: CPTII,95,, | Performed by: NURSE PRACTITIONER

## 2022-11-10 PROCEDURE — 3044F PR MOST RECENT HEMOGLOBIN A1C LEVEL <7.0%: ICD-10-PCS | Mod: CPTII,95,, | Performed by: ORTHOPAEDIC SURGERY

## 2022-11-10 PROCEDURE — 3044F PR MOST RECENT HEMOGLOBIN A1C LEVEL <7.0%: ICD-10-PCS | Mod: CPTII,95,, | Performed by: NURSE PRACTITIONER

## 2022-11-10 PROCEDURE — 99213 OFFICE O/P EST LOW 20 MIN: CPT | Mod: 95,,, | Performed by: NURSE PRACTITIONER

## 2022-11-10 PROCEDURE — 99442 PR PHYSICIAN TELEPHONE EVALUATION 11-20 MIN: CPT | Mod: 95,,, | Performed by: ORTHOPAEDIC SURGERY

## 2022-11-10 PROCEDURE — 1160F RVW MEDS BY RX/DR IN RCRD: CPT | Mod: CPTII,95,, | Performed by: NURSE PRACTITIONER

## 2022-11-10 PROCEDURE — 1159F MED LIST DOCD IN RCRD: CPT | Mod: CPTII,95,, | Performed by: NURSE PRACTITIONER

## 2022-11-10 PROCEDURE — 1159F PR MEDICATION LIST DOCUMENTED IN MEDICAL RECORD: ICD-10-PCS | Mod: CPTII,95,, | Performed by: NURSE PRACTITIONER

## 2022-11-10 PROCEDURE — 3044F HG A1C LEVEL LT 7.0%: CPT | Mod: CPTII,95,, | Performed by: NURSE PRACTITIONER

## 2022-11-10 PROCEDURE — 3044F HG A1C LEVEL LT 7.0%: CPT | Mod: CPTII,95,, | Performed by: ORTHOPAEDIC SURGERY

## 2022-11-10 PROCEDURE — 99442 PR PHYSICIAN TELEPHONE EVALUATION 11-20 MIN: ICD-10-PCS | Mod: 95,,, | Performed by: ORTHOPAEDIC SURGERY

## 2022-11-10 PROCEDURE — 99213 PR OFFICE/OUTPT VISIT, EST, LEVL III, 20-29 MIN: ICD-10-PCS | Mod: 95,,, | Performed by: NURSE PRACTITIONER

## 2022-11-10 NOTE — TELEPHONE ENCOUNTER
attempted to call patient regarding virtual audio appt today. send patient message in [facility].

## 2022-11-10 NOTE — PROGRESS NOTES
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 propion-tazarotene (DUOBRII) 0.01-0.045 % Lotn aaa on feet and hands qhs  then vaseline and warm towel (Patient taking differently: aaa on feet and hands qhs  then vaseline and warm towel) 100 g 3    INV PROPULSID 10 MG Take 2 tablets (20 mg) by mouth 4 (four) times daily. FOR INVESTIGATIONAL USE ONLY. Protocol [id number]  Subject ID: [id number]. 1440 each 0    ketoconazole (NIZORAL) 2 % cream Apply to feet twice daily for 3 weeks (Patient taking differently: Apply to feet twice daily for 3 weeks) 60 g 3    leflunomide (ARAVA) 20 MG Tab Take 1 tablet (20 mg total) by mouth once daily. 90 tablet 0    lifitegrast (XIIDRA) 5 % Dpet INSTILL ONE DROP INTO BOTH EYES TWICE A DAY 60 mL 10    magic mouthwash diphen/antac/lidoc Swish and Spit 5 mL by mouth for 30 seconds twice daily. 150 mL 0    methenamine (HIPREX) 1 gram Tab Take 1 tablet (1 g total) by mouth 2 (two) times daily. 180 tablet 3    methylPREDNISolone (MEDROL DOSEPACK) 4 mg tablet use as directed on package 21 tablet 0    mirabegron (MYRBETRIQ) 25 mg Tb24 ER tablet Take 1 tablet (25 mg total) by mouth once daily. 90 tablet 3    montelukast (SINGULAIR) 10 mg tablet TAKE 1 TABLET BY MOUTH EVERY DAY AT NIGHT 90 tablet 3    naproxen (NAPROSYN) 500 MG tablet Take 1 tablet (500 mg total) by mouth 2 (two) times daily as needed. 180 tablet 0    omeprazole (PRILOSEC) 40  MG capsule Take 1 capsule (40 mg total) by mouth every morning. 30 capsule 6    omeprazole-sodium bicarbonate (ZEGERID) 40-1.1 mg-gram per capsule TAKE 1 CAPSULE BY MOUTH EVERY DAY IN THE MORNING 90 capsule 3    POTASSIUM ORAL Take by mouth once daily.      predniSONE (DELTASONE) 1 MG tablet Take 5 tablets (5 mg total) by mouth once daily. 450 tablet 1    pregabalin (LYRICA) 50 MG capsule Take 3 capsules (150 mg total) by mouth every evening. 270 capsule 1    PREMARIN vaginal cream PLACE 0.5 GRAM VAGINALLY 3 (THREE) TIMES A WEEK. 30 g 1    progesterone (PROMETRIUM) 100 MG capsule Take 1 capsule by mouth daily. 90 capsule 1    progesterone (PROMETRIUM) 100 MG capsule take 1 capsule by mouth daily 90 capsule 4    promethazine (PHENERGAN) 25 MG tablet Take 1 tablet (25 mg total) by mouth every 6 (six) hours as needed for Nausea. 30 tablet 1    rosuvastatin (CRESTOR) 20 MG tablet Take 1 tablet (20 mg total) by mouth every evening. 90 tablet 3    sarilumab (KEVZARA) 200 mg/1.14 mL Syrg Inject 1.14 mL (200 mg total) into the skin every 14 (fourteen) days. 2.28 mL 2    sucralfate (CARAFATE) 1 gram tablet TAKE 1 TABLET (1 G TOTAL) BY MOUTH 4 (FOUR) TIMES DAILY. 360 tablet 2    sucralfate (CARAFATE) 1 gram tablet Take 1 tablet (1 g total) by mouth 4 (four) times daily. 360 tablet 3    traMADoL (ULTRAM) 50 mg tablet TAKE 1 TABLET (50 MG TOTAL) BY MOUTH EVERY 12 (TWELVE) HOURS AS NEEDED FOR PAIN. 60 tablet 1     No current facility-administered medications for this visit.       REVIEW OF SYSTEMS:    GENERAL:  No weight loss, malaise or fevers.  HEENT:  Negative for frequent or significant headaches.  NECK:  Negative for lumps, goiter, pain and significant neck swelling.  RESPIRATORY:  Negative for cough, wheezing or shortness of breath. Asthma.  CARDIOVASCULAR:  Negative for chest pain, leg swelling or palpitations. CAD.  GI:  Negative for abdominal discomfort, blood in stools or black stools or change in bowel  habits.  MUSCULOSKELETAL:  See HPI.  SKIN:  Negative for lesions, rash, and itching.  PSYCH:  Negative for sleep disturbance, mood disorder and recent psychosocial stressors.  HEMATOLOGY/LYMPHOLOGY:  Negative for prolonged bleeding, bruising easily or swollen nodes.  NEURO:   No history of headaches, syncope, paralysis, seizures or tremors.  All other reviewed and negative other than HPI.    Past Medical History:  Past Medical History:   Diagnosis Date    Acid reflux     Allergy     Anemia     Asthma     Coronary artery disease     CS (cervical spondylosis) 3/8/2013    Degenerative disc disease     Dry eyes     Dry mouth     Gastroparesis     History of methotrexate therapy 1/19/2022    Hyperlipidemia     Lateral meniscus derangement 4/6/2016    Lobular carcinoma in situ     Lumbar spondylosis 3/8/2013    Osteoarthritis     Osteoporosis     Rheumatoid arthritis(714.0)     Rupture of left triceps tendon 10/17/2018    Umbilical hernia 8/13/2015       Past Surgical History:  Past Surgical History:   Procedure Laterality Date    BREAST BIOPSY Left 01/29/2002    core bx    CARPAL TUNNEL RELEASE Right 05/2017    CHOLECYSTECTOMY  2004    COLONOSCOPY      10/11    COLONOSCOPY N/A 6/29/2017    Procedure: COLONOSCOPY;  Surgeon: López Moore MD;  Location: [facility] ENDO ([address] [place] [address]);  Service: Endoscopy;  Laterality: N/A;    EPIDURAL STEROID INJECTION N/A 11/18/2021    Procedure: INJECTION, STEROID, EPIDURAL, L5-S1 IL NEED CONSENT;  Surgeon: Tj Mayfield MD;  Location: [facility] PAIN MGT;  Service: Pain Management;  Laterality: N/A;    EPIDURAL STEROID INJECTION N/A 9/29/2022    Procedure: LUMBAR L3/L4 IL MADELINE CONTRAST;  Surgeon: Tj Mayfield MD;  Location: [facility] PAIN MGT;  Service: Pain Management;  Laterality: N/A;    INJECTION OF ANESTHETIC AGENT AROUND NERVE Bilateral 8/23/2021    Procedure: BLOCK, NERVE, MEDIAL BRANCH L3,L4,L5;  Surgeon: Tj Mayfield MD;  Location: [facility] PAIN MGT;  Service: Pain Management;  Laterality: Bilateral;  1  of 2    INJECTION OF ANESTHETIC AGENT AROUND NERVE Bilateral 8/26/2021    Procedure: BLOCK, NERVE, MEDIAL BRANCH L3,L4,L5;  Surgeon: Tj Mayfield MD;  Location: [facility] PAIN MGT;  Service: Pain Management;  Laterality: Bilateral;  2 of 2    INJECTION OF ANESTHETIC AGENT AROUND NERVE Left 7/7/2022    Procedure: BLOCK, NERVE, LEFT OBTURATOR AND FEMORAL;  Surgeon: Tj Mayfield MD;  Location: [facility] PAIN MGT;  Service: Pain Management;  Laterality: Left;    INJECTION OF FACET JOINT Bilateral 3/12/2020    Procedure: FACET JOINT INJECTION (LUMBAR BLOCK) BILATERAL L4-5 AND L5-S1 DIRECT REFERRAL;  Surgeon: Tj Mayfield MD;  Location: [facility] PAIN MGT;  Service: Pain Management;  Laterality: Bilateral;  NEEDS CONSENT    INJECTION OF FACET JOINT Bilateral 3/29/2021    Procedure: INJECTION, FACET JOINT L3/4, L4/5, L5/S1;  Surgeon: Tj Mayfield MD;  Location: [facility] PAIN [facility]GT;  Service: Pain Management;  Laterality: Bilateral;    INJECTION OF JOINT Right 7/30/2020    Procedure: INJECTION, JOINT, RIGHT HIP Interarticular under flouro;  Surgeon: Tj Mayfield MD;  Location: [facility] PAIN [facility]GT;  Service: Pain Management;  Laterality: Right;  INJECTION, JOINT, RIGHT HIP Interarticular under flouro    INJECTION OF JOINT Right 2/21/2022    Procedure: Injection, Joint RIGHT ISCHIAL BURSA;  Surgeon: Tj Mayfield MD;  Location: [facility] PAIN MGT;  Service: Pain Management;  Laterality: Right;    INTRAMEDULLARY RODDING OF FEMUR Left 5/21/2019    Procedure: INSERTION, INTRAMEDULLARY ARIEL, FEMUR;  Surgeon: López Duff MD;  [facility];  Service: Orthopedics;  Laterality: Left;    RADIOFREQUENCY ABLATION Left 9/20/2021    Procedure: RADIOFREQUENCY ABLATION left L3,4,5 RFA  1 of 2  CONSENT NEEDED;  Surgeon: Tj Mayfield MD;  Location: [facility] PAIN MGT;  Service: Pain Management;  Laterality: Left;    RADIOFREQUENCY ABLATION Right 10/4/2021    Procedure: RADIOFREQUENCY ABLATION  right L3,4,5 RFA   2 of 2  CONSENT NEEDED;  Surgeon: Tj Mayfield MD;   Location: [facility] PAIN MGT;  Service: Pain Management;  Laterality: Right;    RADIOFREQUENCY ABLATION Left 4/21/2022    Procedure: Radiofrequency Ablation LEFT L3,L4,L5;  Surgeon: Tj Mayfield MD;  Location: [facility] PAIN MGT;  Service: Pain Management;  Laterality: Left;    RADIOFREQUENCY ABLATION Right 5/12/2022    Procedure: Radiofrequency Ablation RIGHT L3,L4,L5;  Surgeon: Tj Mayfield MD;  Location: [facility] PAIN MGT;  Service: Pain Management;  Laterality: Right;    RADIOFREQUENCY ABLATION Left 7/25/2022    Procedure: Radiofrequency Ablation Left Femoral & Obturator;  Surgeon: Tj Mayfield MD;  Location: [facility] PAIN MGT;  Service: Pain Management;  Laterality: Left;    REPAIR OF TRICEPS TENDON Left 10/17/2018    Procedure: REPAIR, TENDON, TRICEPS left elbow;  Surgeon: Staci Yarbrough MD;  Location: [facility] OR [address] [place] [address];  Service: Orthopedics;  Laterality: Left;  Anesthesia: General and regional. PRONE, k-wire , hand pan 1 and pan 2, CALL ARTHREX/Tamera notified 10-12 LO    TONSILLECTOMY      TRANSFORAMINAL EPIDURAL INJECTION OF STEROID Right 8/31/2020    Procedure: INJECTION, STEROID, EPIDURAL, TRANSFORAMINAL,  APPROACH, L3-L4 and L4-L5 need consent;  Surgeon: Tj Mayfield MD;  Location: [facility] PAIN MGT;  Service: Pain Management;  Laterality: Right;    TRANSFORAMINAL EPIDURAL INJECTION OF STEROID Bilateral 7/23/2021    Procedure: INJECTION, STEROID, EPIDURAL, TRANSFORAMINAL APPROACH L4/5;  Surgeon: Tj Mayfield MD;  Location: [facility] PAIN MGT;  Service: Pain Management;  Laterality: Bilateral;    TRIGGER POINT INJECTION N/A 7/23/2021    Procedure: INJECTION, TRIGGER POINT SCAPULAR;  Surgeon: Tj Mayfield MD;  Location: [facility] PAIN MGT;  Service: Pain Management;  Laterality: N/A;    TUBAL LIGATION  2003    UPPER GASTROINTESTINAL ENDOSCOPY      10/11    uterine ablation  2003       Family History:  Family History   Problem Relation Age of Onset    Cancer Mother         Lung Cancer    Emphysema Mother     Heart attack  Mother     Alcohol abuse Mother     Cancer Father         Lung Cancer    Osteoarthritis Father     Lung cancer Father     Skin cancer Father     Alcohol abuse Father     Heart disease Brother     Heart attack Brother     Alcohol abuse Brother     Cirrhosis Brother     Osteoarthritis Paternal Aunt     Retinal detachment Maternal Aunt     No Known Problems Sister     No Known Problems Maternal Uncle     No Known Problems Paternal Uncle     No Known Problems Maternal Grandmother     No Known Problems Maternal Grandfather     No Known Problems Paternal Grandmother     No Known Problems Paternal Grandfather     Colon cancer Neg Hx     Esophageal cancer Neg Hx     Stomach cancer Neg Hx     Celiac disease Neg Hx     Diabetes Neg Hx     Thyroid disease Neg Hx     Amblyopia Neg Hx     Blindness Neg Hx     Cataracts Neg Hx     Glaucoma Neg Hx     Hypertension Neg Hx     Macular degeneration Neg Hx     Strabismus Neg Hx     Stroke Neg Hx        Social History:  Social History     Socioeconomic History    Marital status:    Tobacco Use    Smoking status: Never    Smokeless tobacco: Never   Substance and Sexual Activity    Alcohol use: Yes     Comment: 2-3 times per year.    Drug use: No    Sexual activity: Yes     Partners: Male     Birth control/protection: Surgical     Social Determinants of Health     Financial Resource Strain: Low Risk     Difficulty of Paying Living Expenses: Not hard at all   Food Insecurity: Unknown    Worried About Running Out of Food in the Last Year: Patient refused    Ran Out of Food in the Last Year: Patient refused   Transportation Needs: Unknown    Lack of Transportation (Medical): Patient refused    Lack of Transportation (Non-Medical): Patient refused   Physical Activity: Unknown    Days of Exercise per Week: 2 days   Stress: No Stress Concern Present    Feeling of Stress : Not at all   Social Connections: Unknown    Frequency of Communication with Friends and Family: Twice a week     Frequency of Social Gatherings with Friends and Family: Twice a week    Active Member of Clubs or Organizations: Yes    Attends Club or Organization Meetings: Patient refused    Marital Status:    Housing Stability: Low Risk     Unable to Pay for Housing in the Last Year: No    Number of Places Lived in the Last Year: 1    Unstable Housing in the Last Year: No       OBJECTIVE:    LMP  (LMP Unknown)       PHYSICAL EXAMINATION:    General appearance: Well appearing, in no acute distress, alert and oriented x3.  Psych:  Mood and affect appropriate.  Skin: Skin color normal, no rashes or lesions, in both upper and lower body.  Extremities: Moves all visualized extremities freely.      PREVIOUS PHYSICAL EXAMINATION:    General appearance: Well appearing, in no acute distress, alert.  Psych:  Mood and affect appropriate.  Back:  No TTP over lumbar facet joints bilaterally. Limited ROM with pain on lumbar extension and flexion.  Facet loading bilaterally L>R. Negative SLR bilaterally.   MSK: TTP to right ischial bursa but does not reproduce radicular symptoms. No pain on movement of right knee. No pain on extension of right knee. TTP at left lateral and anterior hip.  Pain with manipulation of left hip.  Neuro: No loss of sensation.  Kelsey is negative bilaterally.  Gait: Antalgic- ambulates with cane.    ASSESSMENT: 62 y.o. year old female with  Left sided back and hip pain, consistent with the following diagnoses:     1. Lumbar radiculopathy  Procedure Order to Pain Management      2. Myofascial pain        3. Other chronic pain        4. Lumbar spondylosis                PLAN:      - Pt is s/p L3/4 IL MADELINE with benefit of back pain. She is having radicular symptoms down the posterior legs. She would like an injection for this prior to upcoming surgery in January.    - Previous Lumbar MRI reviewed.     - Continue with PT.    - The patient will continue a home exercise routine to help with pain and strengthening.       - RTC in 6-8 weeks or sooner if needed.    The above plan and management options were discussed at length with patient. Patient is in agreement with the above and verbalized understanding.    Valarie Law  11/10/2022

## 2022-11-11 NOTE — PROGRESS NOTES
Established Patient - Audio Only Telehealth Visit     The patient location is: [facility]  The chief complaint leading to consultation is: f/up CT scan, ask questions about surgery  Visit type: Virtual visit with audio only (telephone)  Total time spent with patient: 18       The reason for the audio only service rather than synchronous audio and video virtual visit was related to technical difficulties or patient preference/necessity.     Each patient to whom I provide medical services by telemedicine is:  (1) informed of the relationship between the physician and patient and the respective role of any other health care provider with respect to management of the patient; and (2) notified that they may decline to receive medical services by telemedicine and may withdraw from such care at any time. Patient verbally consented to receive this service via voice-only telephone call.       HPI: 62 y.o. 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  CT was obtained to evaluate the ankle joint.    Her other concern today relates to increasing assymetric swelling in the leg.  Notes worse with activity but doesn't completely go away with rest/elevation either.    CT scan independently reviewed and interpreted and demonstrates maintained ankle joint with endstage subtalar OA and moderate midfoot OA including TMT joints.       Assessment and plan:    1. Left leg swelling    2. Pes planovalgus, acquired, left    3. Degenerative joint disease of hindfoot, left    4. Equinus contracture of left ankle       We reviewed findings of her CT scan my surgical recommendation is unchanged- triple + medial column arthrodesis, ARLEN, CBG.  Prefer IM fixation for added stability of the entire medial column.    Will get her asymmetric swelling evaluated prior to surgery but discussed I anticipate this is related to disease process.                             This service was not originating from a related E/M service provided within the previous 7 days nor will  to an E/M service or procedure within the next 24 hours or my soonest available appointment.  Prevailing standard of care was able to be met in this audio-only visit.

## 2022-11-14 DIAGNOSIS — I87.2 VENOUS INSUFFICIENCY: Primary | ICD-10-CM

## 2022-11-20 ENCOUNTER — PATIENT MESSAGE (OUTPATIENT)
Dept: OTOLARYNGOLOGY | Facility: CLINIC | Age: 62
End: 2022-11-20
Payer: MEDICARE

## 2022-11-21 ENCOUNTER — PATIENT MESSAGE (OUTPATIENT)
Dept: GASTROENTEROLOGY | Facility: CLINIC | Age: 62
End: 2022-11-21
Payer: MEDICARE

## 2022-11-21 ENCOUNTER — TELEPHONE (OUTPATIENT)
Dept: ORTHOPEDICS | Facility: CLINIC | Age: 62
End: 2022-11-21
Payer: MEDICARE

## 2022-11-21 ENCOUNTER — TELEPHONE (OUTPATIENT)
Dept: OTOLARYNGOLOGY | Facility: CLINIC | Age: 62
End: 2022-11-21
Payer: MEDICARE

## 2022-11-21 ENCOUNTER — TELEPHONE (OUTPATIENT)
Dept: ENDOSCOPY | Facility: HOSPITAL | Age: 62
End: 2022-11-21
Payer: MEDICARE

## 2022-11-21 ENCOUNTER — PATIENT MESSAGE (OUTPATIENT)
Dept: ORTHOPEDICS | Facility: CLINIC | Age: 62
End: 2022-11-21
Payer: MEDICARE

## 2022-11-21 NOTE — TELEPHONE ENCOUNTER
Called patient regarding upcoming appt for today at 10am with Dr. Villa that was scheduled. Patient stated she was in dentist chair getting her teeth checked out to make sure the source of her pain is not coming from her teeth. Patient asked if there was a later appt today with Dr. Villa and informed the patient there was not. Patient asked about appt in [place] but informed there is not a full time provider in [place]. Offered next available on 11/22 with TARA Roman. Patient accepted appointment and was thanked for call.

## 2022-11-21 NOTE — TELEPHONE ENCOUNTER
----- Message from Monica Ellis sent at 11/21/2022 11:44 AM CST -----  Contact: Pt  Pt is requesting a callback from Delilah. Pt is trying to reschedule all appts for today. Pt stated that she currently have an infected tooth and cannot make it to the appts. Please adv pt.     Confirmed contact below:   Contact Name:Joyce Peterson  Phone Number: [phone]

## 2022-11-22 ENCOUNTER — TELEPHONE (OUTPATIENT)
Dept: PREADMISSION TESTING | Facility: HOSPITAL | Age: 62
End: 2022-11-22
Payer: MEDICARE

## 2022-11-30 ENCOUNTER — TELEPHONE (OUTPATIENT)
Dept: GASTROENTEROLOGY | Facility: CLINIC | Age: 62
End: 2022-11-30
Payer: MEDICARE

## 2022-11-30 NOTE — TELEPHONE ENCOUNTER
----- Message from Betty Hardy sent at 11/29/2022  6:53 PM CST -----  Regarding: RE: Joyce Kristen - [facility] Appointment - Reschedule  Thank you, Delilah!    ----- Message -----  From: Linda Boone MA  Sent: 11/29/2022   2:27 PM CST  To: Betty Hardy  Subject: RE: Joyce Kristen - [facility] Appointment#    Hi,  I have scheduled Mrs. Peterson on 12/8 for 1:00 to see .   Delilah  ----- Message -----  From: Betty Hardy  Sent: 11/21/2022   1:30 PM CST  To: Linda Boone MA  Subject: Joyce Peterson - [facility] Appointment - R#    Hi Delilah,  I know you are out on GPT, but wanted to pass this info on to you. Joyce Peterson had to reschedule her appointment today (11/21) due to a tooth infection. She would like to come in the week of 12/5 (she is out of town the week prior). Dr. Moore can see her on 12/7 or 12/8 at 1pm. Could you please reschedule her when you have a moment? Thank you so much!!    Much appreciated!  Alisha

## 2022-12-02 ENCOUNTER — TELEPHONE (OUTPATIENT)
Dept: ENDOSCOPY | Facility: HOSPITAL | Age: 62
End: 2022-12-02
Payer: MEDICARE

## 2022-12-02 NOTE — TELEPHONE ENCOUNTER
----- Message from Kika Craig sent at 12/2/2022  8:52 AM CST -----  Contact: pt  Pt is trying to reach Ms. Mazariegos in regards to a missed phone call. She would like a call back as soon as possible.     Confirmed contact below:  Contact Name:Joyce Peterson  Phone Number: [phone]

## 2022-12-05 ENCOUNTER — PATIENT MESSAGE (OUTPATIENT)
Dept: GASTROENTEROLOGY | Facility: CLINIC | Age: 62
End: 2022-12-05
Payer: MEDICARE

## 2022-12-07 ENCOUNTER — SPECIALTY PHARMACY (OUTPATIENT)
Dept: PHARMACY | Facility: CLINIC | Age: 62
End: 2022-12-07
Payer: MEDICARE

## 2022-12-07 ENCOUNTER — HOSPITAL ENCOUNTER (OUTPATIENT)
Dept: RADIOLOGY | Facility: HOSPITAL | Age: 62
Discharge: HOME | End: 2022-12-07
Attending: STUDENT IN AN ORGANIZED HEALTH CARE EDUCATION/TRAINING PROGRAM
Payer: MEDICARE

## 2022-12-07 DIAGNOSIS — M25.511 CHRONIC RIGHT SHOULDER PAIN: ICD-10-CM

## 2022-12-07 DIAGNOSIS — G89.29 CHRONIC RIGHT SHOULDER PAIN: ICD-10-CM

## 2022-12-07 DIAGNOSIS — I87.2 VENOUS INSUFFICIENCY: Primary | ICD-10-CM

## 2022-12-07 PROCEDURE — 73221 MRI JOINT UPR EXTREM W/O DYE: CPT | Mod: TC,RT

## 2022-12-07 PROCEDURE — 73221 MRI JOINT UPR EXTREM W/O DYE: CPT | Mod: 26,RT,, | Performed by: RADIOLOGY

## 2022-12-07 PROCEDURE — 73221 MRI SHOULDER WITHOUT CONTRAST RIGHT: ICD-10-PCS | Mod: 26,RT,, | Performed by: RADIOLOGY

## 2022-12-07 NOTE — TELEPHONE ENCOUNTER
Specialty Pharmacy - Refill Coordination    Specialty Medication Orders Linked to Encounter      Flowsheet Row Most Recent Value   Medication #1 sarilumab (KEVZARA) 200 mg/1.14 mL Syrg (Order#[id number], Rx#[id number])            Refill Questions - Documented Responses      Flowsheet Row Most Recent Value   Patient Availability and HIPAA Verification    Does patient want to proceed with activity? Yes   HIPAA/medical authority confirmed? Yes   Relationship to patient of person spoken to? Self   Refill Screening Questions    Changes to allergies? No   Changes to medications? No   New conditions since last clinic visit? No   Unplanned office visit, urgent care, ED, or hospital admission in the last 4 weeks? No   How does patient/caregiver feel medication is working? Good   Financial problems or insurance changes? No   How many doses of your specialty medications were missed in the last 4 weeks? 0   Would patient like to speak to a pharmacist? No   When does the patient need to receive the medication? 12/12/22   Refill Delivery Questions    How will the patient receive the medication? [facility]   When does the patient need to receive the medication? 12/12/22   Shipping Address Home   Address in [facility] confirmed and updated if neccessary? Yes   Expected Copay ($) 50   Is the patient able to afford the medication copay? Yes   Payment Method CC on file   Days supply of Refill 28   Supplies needed? No supplies needed   Refill activity completed? Yes   Refill activity plan Refill scheduled   Shipment/Pickup Date: 12/08/22            Current Outpatient Medications   Medication Sig    albuterol (PROVENTIL/VENTOLIN HFA) 90 mcg/actuation inhaler INHALE 2 PUFFS INTO THE LUNGS EVERY 6 (SIX) HOURS AS NEEDED. RESCUE    albuterol-ipratropium (DUO-NEB) 2.5 mg-0.5 mg/3 mL nebulizer solution Take 3 mLs by nebulization every 6 (six) hours as needed for Wheezing. Rescue    ALPRAZolam (XANAX) 0.5 MG tablet Take Once on call to  procedure    aspirin 81 mg Tab Take 81 mg by mouth every morning.     budesonide-formoterol 160-4.5 mcg (SYMBICORT) 160-4.5 mcg/actuation HFAA INHALE 2 PUFFS INTO THE LUNGS EVERY 12 (TWELVE) HOURS.    calcium citrate-vitamin D3 315-200 mg (CITRACAL+D) 315 mg-5 mcg (200 unit) per tablet Take 1 tablet by mouth 2 (two) times daily.     clindamycin (CLEOCIN) 300 MG capsule Take 2 capsules by mouth now, then take 1 capsule 4 times daily until finished    cycloSPORINE (RESTASIS) 0.05 % ophthalmic emulsion Instill one drop into both eyes two times per day    diazePAM (VALIUM) 10 MG Tab Take 1 tablet (10 mg total) by mouth once as needed.    diclofenac sodium (VOLTAREN) 1 % Gel APPLY 2 GRAMS TOPICALLY 4 (FOUR) TIMES DAILY AS NEEDED.  Strength: 1 %    estrogens, conjugated, (PREMARIN) 0.625 MG tablet take 1 tablet by mouth daily    fluconazole (DIFLUCAN) 100 MG tablet Take 1 tablet by mouth once daily for 14 days.    halobetasol propion-tazarotene (DUOBRII) 0.01-0.045 % Lotn aaa on feet and hands qhs  then vaseline and warm towel (Patient taking differently: aaa on feet and hands qhs  then vaseline and warm towel)    ibuprofen (ADVIL,MOTRIN) 800 MG tablet Take 1 tablet (800 mg total) by mouth every 6 to 8 hours as needed for pain.    INV PROPULSID 10 MG Take 2 tablets (20 mg) by mouth 4 (four) times daily. FOR INVESTIGATIONAL USE ONLY. Protocol [id number]  Subject ID: [id number].    ketoconazole (NIZORAL) 2 % cream Apply to feet twice daily for 3 weeks (Patient taking differently: Apply to feet twice daily for 3 weeks)    leflunomide (ARAVA) 20 MG Tab TAKE 1 TABLET BY MOUTH EVERY DAY    lifitegrast (XIIDRA) 5 % Dpet INSTILL ONE DROP INTO BOTH EYES TWICE A DAY    magic mouthwash diphen/antac/lidoc Swish and Spit 5 mL by mouth for 30 seconds twice daily.    methenamine (HIPREX) 1 gram Tab Take 1 tablet (1 g total) by mouth 2 (two) times daily.    methylPREDNISolone (MEDROL DOSEPACK) 4 mg tablet use as directed on package     mirabegron (MYRBETRIQ) 25 mg Tb24 ER tablet Take 1 tablet (25 mg total) by mouth once daily.    montelukast (SINGULAIR) 10 mg tablet TAKE 1 TABLET BY MOUTH EVERY DAY AT NIGHT    naproxen (NAPROSYN) 500 MG tablet Take 1 tablet (500 mg total) by mouth 2 (two) times daily as needed.    omeprazole (PRILOSEC) 40 MG capsule Take 1 capsule (40 mg total) by mouth every morning.    omeprazole-sodium bicarbonate (ZEGERID) 40-1.1 mg-gram per capsule TAKE 1 CAPSULE BY MOUTH EVERY DAY IN THE MORNING    POTASSIUM ORAL Take by mouth once daily.    predniSONE (DELTASONE) 1 MG tablet Take 5 tablets (5 mg total) by mouth once daily.    predniSONE (DELTASONE) 5 MG tablet TAKE 1 TABLET BY MOUTH EVERY DAY    pregabalin (LYRICA) 50 MG capsule Take 3 capsules (150 mg total) by mouth every evening.    PREMARIN vaginal cream PLACE 0.5 GRAM VAGINALLY 3 (THREE) TIMES A WEEK.    progesterone (PROMETRIUM) 100 MG capsule Take 1 capsule by mouth daily.    progesterone (PROMETRIUM) 100 MG capsule take 1 capsule by mouth daily    promethazine (PHENERGAN) 25 MG tablet Take 1 tablet (25 mg total) by mouth every 6 (six) hours as needed for Nausea.    rosuvastatin (CRESTOR) 20 MG tablet Take 1 tablet (20 mg total) by mouth every evening.    sarilumab (KEVZARA) 200 mg/1.14 mL Syrg Inject 1.14 mL (200 mg total) into the skin every 14 (fourteen) days.    sucralfate (CARAFATE) 1 gram tablet TAKE 1 TABLET (1 G TOTAL) BY MOUTH 4 (FOUR) TIMES DAILY.    sucralfate (CARAFATE) 1 gram tablet Take 1 tablet (1 g total) by mouth 4 (four) times daily.    traMADoL (ULTRAM) 50 mg tablet TAKE 1 TABLET (50 MG TOTAL) BY MOUTH EVERY 12 (TWELVE) HOURS AS NEEDED FOR PAIN.   Last reviewed on 12/7/2022 11:33 AM by Prasanna Dacosta RN    Review of patient's allergies indicates:   Allergen Reactions    Actemra [tocilizumab] Other (See Comments)     Severe dizziness    Codeine Nausea And Vomiting and Hives    Gold au 198 Hives and Rash    Hydroxychloroquine Other (See Comments)      "Can't remember the reaction      Iodinated contrast media Other (See Comments)     Other reaction(s): BURNING ALL OVER    Iodine Other (See Comments) and Hives     Other reaction(s): BURNING ALL OVER - Iodine dye - Not topical    Ondansetron Nausea And Vomiting    Sulfa (sulfonamide antibiotics) Other (See Comments)     Can't remember the reaction    Zofran [ondansetron hcl (pf)] Nausea And Vomiting     Pt reports that last time she received zofran she started vomiting again    Methotrexate analogues Nausea Only    Pneumococcal vaccine Other (See Comments)    Pneumovax 23 [pneumococcal 23-argenis ps vaccine] Other (See Comments)     "sick"    Methotrexate Nausea Only    Last reviewed on  12/7/2022 11:32 AM by Prasanna Dacosta      Tasks added this encounter   1/2/2023 - Refill Call (Auto Added)   Tasks due within next 3 months   3/4/2023 - Clinical - Follow Up Assesement (Annual)     Charlotte Estrella, PharmD  [facility]  [address]  [facility] [address]  Phone: [phone]  Fax: [phone]        "

## 2022-12-08 ENCOUNTER — PATIENT MESSAGE (OUTPATIENT)
Dept: ORTHOPEDICS | Facility: CLINIC | Age: 62
End: 2022-12-08
Payer: MEDICARE

## 2022-12-09 ENCOUNTER — TELEPHONE (OUTPATIENT)
Dept: ORTHOPEDICS | Facility: CLINIC | Age: 62
End: 2022-12-09
Payer: MEDICARE

## 2022-12-09 NOTE — TELEPHONE ENCOUNTER
Spoke with pt informing her that Dr Humphrey will review her MRI results on Thursday.  [place]

## 2022-12-09 NOTE — TELEPHONE ENCOUNTER
----- Message from Jason Lew sent at 12/9/2022  2:34 PM CST -----  Regarding: Results f/u  Contact: TONNY GOMEZ [[id number]]  Name of Who is Calling: TONNY GOMEZ [[id number]]      What is the request in detail: Would like to speak with staff in regards to following up on her MRI results. Please advise      Can the clinic reply by [facility]: yes      What Number to Call Back if not in [facility]: [phone]

## 2022-12-12 ENCOUNTER — HOSPITAL ENCOUNTER (OUTPATIENT)
Facility: OTHER | Age: 62
Discharge: HOME | End: 2022-12-12
Attending: ANESTHESIOLOGY | Admitting: ANESTHESIOLOGY
Payer: MEDICARE

## 2022-12-12 VITALS
HEIGHT: 61 IN | WEIGHT: 143 LBS | OXYGEN SATURATION: 100 % | TEMPERATURE: 99 F | DIASTOLIC BLOOD PRESSURE: 77 MMHG | HEART RATE: 82 BPM | SYSTOLIC BLOOD PRESSURE: 133 MMHG | RESPIRATION RATE: 16 BRPM | BODY MASS INDEX: 27 KG/M2

## 2022-12-12 DIAGNOSIS — G89.29 CHRONIC PAIN: ICD-10-CM

## 2022-12-12 DIAGNOSIS — M54.16 LUMBAR RADICULOPATHY: Primary | ICD-10-CM

## 2022-12-12 PROCEDURE — A4216 STERILE WATER/SALINE, 10 ML: HCPCS | Performed by: ANESTHESIOLOGY

## 2022-12-12 PROCEDURE — 25000003 PHARM REV CODE 250: Performed by: ANESTHESIOLOGY

## 2022-12-12 PROCEDURE — 62323 NJX INTERLAMINAR LMBR/SAC: CPT | Performed by: ANESTHESIOLOGY

## 2022-12-12 PROCEDURE — 25000003 PHARM REV CODE 250: Performed by: STUDENT IN AN ORGANIZED HEALTH CARE EDUCATION/TRAINING PROGRAM

## 2022-12-12 PROCEDURE — 63600175 PHARM REV CODE 636 W HCPCS: Performed by: ANESTHESIOLOGY

## 2022-12-12 PROCEDURE — 62323 PR INJ LUMBAR/SACRAL, W/IMAGING GUIDANCE: ICD-10-PCS | Mod: ,,, | Performed by: ANESTHESIOLOGY

## 2022-12-12 PROCEDURE — 62323 NJX INTERLAMINAR LMBR/SAC: CPT | Mod: ,,, | Performed by: ANESTHESIOLOGY

## 2022-12-12 PROCEDURE — 25500020 PHARM REV CODE 255: Performed by: ANESTHESIOLOGY

## 2022-12-12 RX ORDER — FENTANYL CITRATE 50 UG/ML
INJECTION, SOLUTION INTRAMUSCULAR; INTRAVENOUS
Status: DISCONTINUED | OUTPATIENT
Start: 2022-12-12 | End: 2022-12-12 | Stop reason: HOSPADM

## 2022-12-12 RX ORDER — MIDAZOLAM HYDROCHLORIDE 1 MG/ML
INJECTION INTRAMUSCULAR; INTRAVENOUS
Status: DISCONTINUED | OUTPATIENT
Start: 2022-12-12 | End: 2022-12-12 | Stop reason: HOSPADM

## 2022-12-12 RX ORDER — SODIUM CHLORIDE 9 MG/ML
INJECTION, SOLUTION INTRAMUSCULAR; INTRAVENOUS; SUBCUTANEOUS
Status: DISCONTINUED | OUTPATIENT
Start: 2022-12-12 | End: 2022-12-12 | Stop reason: HOSPADM

## 2022-12-12 RX ORDER — DEXAMETHASONE SODIUM PHOSPHATE 10 MG/ML
INJECTION INTRAMUSCULAR; INTRAVENOUS
Status: DISCONTINUED | OUTPATIENT
Start: 2022-12-12 | End: 2022-12-12 | Stop reason: HOSPADM

## 2022-12-12 RX ORDER — SODIUM CHLORIDE 9 MG/ML
500 INJECTION, SOLUTION INTRAVENOUS CONTINUOUS
Status: DISCONTINUED | OUTPATIENT
Start: 2022-12-12 | End: 2022-12-12 | Stop reason: HOSPADM

## 2022-12-12 RX ORDER — LIDOCAINE HYDROCHLORIDE 20 MG/ML
INJECTION, SOLUTION INFILTRATION; PERINEURAL
Status: DISCONTINUED | OUTPATIENT
Start: 2022-12-12 | End: 2022-12-12 | Stop reason: HOSPADM

## 2022-12-12 RX ORDER — LIDOCAINE HYDROCHLORIDE 10 MG/ML
INJECTION, SOLUTION EPIDURAL; INFILTRATION; INTRACAUDAL; PERINEURAL
Status: DISCONTINUED | OUTPATIENT
Start: 2022-12-12 | End: 2022-12-12 | Stop reason: HOSPADM

## 2022-12-12 RX ORDER — DIPHENHYDRAMINE HYDROCHLORIDE 50 MG/ML
25 INJECTION INTRAMUSCULAR; INTRAVENOUS ONCE
Status: COMPLETED | OUTPATIENT
Start: 2022-12-12 | End: 2022-12-12

## 2022-12-12 RX ADMIN — DIPHENHYDRAMINE HYDROCHLORIDE 25 MG: 50 INJECTION INTRAMUSCULAR; INTRAVENOUS at 09:12

## 2022-12-12 NOTE — H&P
HPI  Patient presenting for Procedure(s) (LRB):  INJECTION, STEROID, EPIDURAL L5/S1 IL CONTRAST (N/A)     Patient on Anti-coagulation No    No health changes since previous encounter    Past Medical History:   Diagnosis Date    Acid reflux     Allergy     Anemia     Asthma     Coronary artery disease     CS (cervical spondylosis) 3/8/2013    Degenerative disc disease     Dry eyes     Dry mouth     Gastroparesis     History of methotrexate therapy 1/19/2022    Hyperlipidemia     Lateral meniscus derangement 4/6/2016    Lobular carcinoma in situ     Lumbar spondylosis 3/8/2013    Osteoarthritis     Osteoporosis     Rheumatoid arthritis(714.0)     Rupture of left triceps tendon 10/17/2018    Umbilical hernia 8/13/2015     Past Surgical History:   Procedure Laterality Date    BREAST BIOPSY Left 01/29/2002    core bx    CARPAL TUNNEL RELEASE Right 05/2017    CHOLECYSTECTOMY  2004    COLONOSCOPY      10/11    COLONOSCOPY N/A 6/29/2017    Procedure: COLONOSCOPY;  Surgeon: López Moore MD;  [facility]  Service: Endoscopy;  Laterality: N/A;    EPIDURAL STEROID INJECTION N/A 11/18/2021    Procedure: INJECTION, STEROID, EPIDURAL, L5-S1 IL NEED CONSENT;  Surgeon: Tj Mayfield MD;  Location: [facility] PAIN MGT;  Service: Pain Management;  Laterality: N/A;    EPIDURAL STEROID INJECTION N/A 9/29/2022    Procedure: LUMBAR L3/L4 IL MADELINE CONTRAST;  Surgeon: Tj Mayfield MD;  Location: [facility] PAIN MGT;  Service: Pain Management;  Laterality: N/A;    INJECTION OF ANESTHETIC AGENT AROUND NERVE Bilateral 8/23/2021    Procedure: BLOCK, NERVE, MEDIAL BRANCH L3,L4,L5;  Surgeon: Tj Mayfield MD;  Location: [facility] PAIN MGT;  Service: Pain Management;  Laterality: Bilateral;  1 of 2    INJECTION OF ANESTHETIC AGENT AROUND NERVE Bilateral 8/26/2021    Procedure: BLOCK, NERVE, MEDIAL BRANCH L3,L4,L5;  Surgeon: Tj Mayfield MD;  Location: [facility] PAIN MGT;  Service: Pain Management;  Laterality: Bilateral;  2 of 2    INJECTION OF  ANESTHETIC AGENT AROUND NERVE Left 7/7/2022    Procedure: BLOCK, NERVE, LEFT OBTURATOR AND FEMORAL;  Surgeon: Tj Mayfield MD;  Location: [facility] PAIN MGT;  Service: Pain Management;  Laterality: Left;    INJECTION OF FACET JOINT Bilateral 3/12/2020    Procedure: FACET JOINT INJECTION (LUMBAR BLOCK) BILATERAL L4-5 AND L5-S1 DIRECT REFERRAL;  Surgeon: Tj Mayfield MD;  Location: [facility] PAIN [facility]GT;  Service: Pain Management;  Laterality: Bilateral;  NEEDS CONSENT    INJECTION OF FACET JOINT Bilateral 3/29/2021    Procedure: INJECTION, FACET JOINT L3/4, L4/5, L5/S1;  Surgeon: Tj Mayfield MD;  Location: [facility] PAIN MGT;  Service: Pain Management;  Laterality: Bilateral;    INJECTION OF JOINT Right 7/30/2020    Procedure: INJECTION, JOINT, RIGHT HIP Interarticular under flouro;  Surgeon: Tj Mayfield MD;  Location: [facility] PAIN [facility]GT;  Service: Pain Management;  Laterality: Right;  INJECTION, JOINT, RIGHT HIP Interarticular under flouro    INJECTION OF JOINT Right 2/21/2022    Procedure: Injection, Joint RIGHT ISCHIAL BURSA;  Surgeon: Tj Mayfield MD;  Location: [facility] PAIN MGT;  Service: Pain Management;  Laterality: Right;    INTRAMEDULLARY RODDING OF FEMUR Left 5/21/2019    Procedure: INSERTION, INTRAMEDULLARY ARIEL, FEMUR;  Surgeon: López Duff MD;  Location: [facility] OR [address] [place] [address];  Service: Orthopedics;  Laterality: Left;    RADIOFREQUENCY ABLATION Left 9/20/2021    Procedure: RADIOFREQUENCY ABLATION left L3,4,5 RFA  1 of 2  CONSENT NEEDED;  Surgeon: Tj Mayfield MD;  Location: [facility] PAIN MGT;  Service: Pain Management;  Laterality: Left;    RADIOFREQUENCY ABLATION Right 10/4/2021    Procedure: RADIOFREQUENCY ABLATION  right L3,4,5 RFA   2 of 2  CONSENT NEEDED;  Surgeon: Tj Mayfield MD;  Location: [facility] PAIN MGT;  Service: Pain Management;  Laterality: Right;    RADIOFREQUENCY ABLATION Left 4/21/2022    Procedure: Radiofrequency Ablation LEFT L3,L4,L5;  Surgeon: Tj Mayfield MD;  Location: [facility] PAIN MGT;  Service: Pain Management;   Laterality: Left;    RADIOFREQUENCY ABLATION Right 5/12/2022    Procedure: Radiofrequency Ablation RIGHT L3,L4,L5;  Surgeon: Tj Mayfield MD;  Location: [facility] PAIN MGT;  Service: Pain Management;  Laterality: Right;    RADIOFREQUENCY ABLATION Left 7/25/2022    Procedure: Radiofrequency Ablation Left Femoral & Obturator;  Surgeon: Tj Mayfield MD;  Location: [facility] PAIN MGT;  Service: Pain Management;  Laterality: Left;    REPAIR OF TRICEPS TENDON Left 10/17/2018    Procedure: REPAIR, TENDON, TRICEPS left elbow;  Surgeon: Staci Yarbrough MD;  Location: [facility] OR 1ST FLR;  Service: Orthopedics;  Laterality: Left;  Anesthesia: General and regional. PRONE, k-wire , hand pan 1 and pan 2, CALL ARTHREX/Tamera notified 10-12 LO    TONSILLECTOMY      TRANSFORAMINAL EPIDURAL INJECTION OF STEROID Right 8/31/2020    Procedure: INJECTION, STEROID, EPIDURAL, TRANSFORAMINAL,  APPROACH, L3-L4 and L4-L5 need consent;  Surgeon: Tj Mayfield MD;  Location: [facility] PAIN MGT;  Service: Pain Management;  Laterality: Right;    TRANSFORAMINAL EPIDURAL INJECTION OF STEROID Bilateral 7/23/2021    Procedure: INJECTION, STEROID, EPIDURAL, TRANSFORAMINAL APPROACH L4/5;  Surgeon: Tj Mayfield MD;  Location: [facility] PAIN MGT;  Service: Pain Management;  Laterality: Bilateral;    TRIGGER POINT INJECTION N/A 7/23/2021    Procedure: INJECTION, TRIGGER POINT SCAPULAR;  Surgeon: Tj Mayfield MD;  Location: [facility]IN [facility]GT;  Service: Pain Management;  Laterality: N/A;    TUBAL LIGATION  2003    UPPER GASTROINTESTINAL ENDOSCOPY      10/11    uterine ablation  2003     Review of patient's allergies indicates:   Allergen Reactions    Actemra [tocilizumab] Other (See Comments)     Severe dizziness    Codeine Nausea And Vomiting and Hives    Gold au 198 Hives and Rash    Hydroxychloroquine Other (See Comments)     Can't remember the reaction      Iodinated contrast media Other (See Comments)     Other reaction(s): BURNING ALL OVER    Iodine Other (See  "Comments) and Hives     Other reaction(s): BURNING ALL OVER - Iodine dye - Not topical    Ondansetron Nausea And Vomiting    Sulfa (sulfonamide antibiotics) Other (See Comments)     Can't remember the reaction    Zofran [ondansetron hcl (pf)] Nausea And Vomiting     Pt reports that last time she received zofran she started vomiting again    Methotrexate analogues Nausea Only    Pneumococcal vaccine Other (See Comments)    Pneumovax 23 [pneumococcal 23-argenis ps vaccine] Other (See Comments)     "sick"    Methotrexate Nausea Only      Current Facility-Administered Medications   Medication    0.9%  NaCl infusion       PMHx, PSHx, Allergies, Medications reviewed in epic    ROS negative except pain complaints in HPI    OBJECTIVE:    LMP  (LMP Unknown)   Breastfeeding No     PHYSICAL EXAMINATION:    GENERAL: Well appearing, in no acute distress, alert and oriented x3.  PSYCH:  Mood and affect appropriate.  SKIN: Skin color, texture, turgor normal, no rashes or lesions which will impact the procedure.  CV: RRR with palpation of the radial artery.  PULM: No evidence of respiratory difficulty, symmetric chest rise. Clear to auscultation.  NEURO: Cranial nerves grossly intact.    Plan:    Proceed with procedure as planned Procedure(s) (LRB):  INJECTION, STEROID, EPIDURAL L5/S1 IL CONTRAST (N/A)    Ady Proctor  12/12/2022            "

## 2022-12-12 NOTE — OP NOTE
Lumbar Interlaminar Epidural Steroid Injection under Fluoroscopic Guidance    The procedure, risks, benefits, and options were discussed with the patient. There are no contraindications to the procedure. The patent expressed understanding and agreed to the procedure. Informed written consent was obtained prior to the start of the procedure and can be found in the patient's chart.    PATIENT NAME: Joyce Peterson   MRN: [id number]     DATE OF PROCEDURE: 12/12/2022    PROCEDURE: Lumbar Interlaminar Epidural Steroid Injection L5/S1 under Fluoroscopic Guidance    PRE-OP DIAGNOSIS: Lumbar radiculopathy [M54.16] Lumbar radiculopathy [M54.16]    POST-OP DIAGNOSIS: Same    PHYSICIAN: Tj Mayfield MD    ASSISTANTS: Yolanda Fischer MD [facility] & RENATA Proctor MD  [employer] PM&R,, PGY2      MEDICATIONS INJECTED: Preservative-free Decadron 10mg with 4cc of Lidocaine 1% MPF and preservative free normal saline    LOCAL ANESTHETIC INJECTED: Xylocaine 2%     SEDATION: Versed 2mg and Fentanyl 25mcg                                                                                                                                                                                     Conscious sedation ordered by M.D. Patient re-evaluation prior to administration of conscious sedation. No changes noted in patient's status from initial evaluation. The patient's vital signs were monitored by RN and patient remained hemodynamically stable throughout the procedure.    Event Time In   Sedation Start 1048   Sedation End 1049       ESTIMATED BLOOD LOSS: None    COMPLICATIONS: None    TECHNIQUE: Time-out was performed to identify the patient and procedure to be performed. With the patient laying in a prone position, the surgical area was prepped and draped in the usual sterile fashion using ChloraPrep and a fenestrated drape. The level was determined under fluoroscopy guidance. Skin anesthesia was achieved by injecting Lidocaine 2% over the  injection site. The interlaminar space was then approached with a 20 gauge,  3.5 inch Tuohy needle that was introduced under fluoroscopic guidance in the AP, lateral and/or contralateral oblique imaging. Once the Ligamentum flavum was encountered loss of resistance to saline was used to enter the epidural space. With positive loss of resistance and negative aspiration for CSF or Blood, contrast dye Omnipaque (240mg/mL) was injected to confirm placement and there was no vascular runoff. 4 mL of the medication mixture listed above was injected slowly. Displacement of the radio opaque contrast after injection of the medication confirmed that the medication went into the area of the epidural space. The needles were removed and bleeding was nil. A sterile dressing was applied. No specimens collected. The patient tolerated the procedure well.   PAIN BEFORE THE PROCEDURE: 3-9/10    PAIN AFTER THE PROCEDURE: 0/10      The patient was monitored after the procedure in the recovery area. They were given post-procedure and discharge instructions to follow at home. The patient was discharged in a stable condition.        Tj Mayfield MD

## 2022-12-12 NOTE — DISCHARGE SUMMARY
Discharge Note  Short Stay      SUMMARY     Admit Date: 12/12/2022    Attending Physician: Tj Mayfield      Discharge Physician: Tj Mayfield      Discharge Date: 12/12/2022 10:45 AM    Procedure(s) (LRB):  INJECTION, STEROID, EPIDURAL L5/S1 IL CONTRAST (N/A)    Final Diagnosis: Lumbar radiculopathy [M54.16]    Disposition: Home or self care    Patient Instructions:   Current Discharge Medication List        CONTINUE these medications which have NOT CHANGED    Details   albuterol (PROVENTIL/VENTOLIN HFA) 90 mcg/actuation inhaler INHALE 2 PUFFS INTO THE LUNGS EVERY 6 (SIX) HOURS AS NEEDED. RESCUE  Qty: 20.1 g, Refills: 11      albuterol-ipratropium (DUO-NEB) 2.5 mg-0.5 mg/3 mL nebulizer solution Take 3 mLs by nebulization every 6 (six) hours as needed for Wheezing. Rescue  Qty: 90 mL, Refills: 3    Associated Diagnoses: Moderate persistent asthma with acute exacerbation; Bronchitis      ALPRAZolam (XANAX) 0.5 MG tablet Take Once on call to procedure  Qty: 1 tablet, Refills: 0    Associated Diagnoses: Left hip pain      aspirin 81 mg Tab Take 81 mg by mouth every morning.       budesonide-formoterol 160-4.5 mcg (SYMBICORT) 160-4.5 mcg/actuation HFAA INHALE 2 PUFFS INTO THE LUNGS EVERY 12 (TWELVE) HOURS.  Qty: 30.6 g, Refills: 3    Associated Diagnoses: Chronic asthma, mild intermittent, uncomplicated      calcium citrate-vitamin D3 315-200 mg (CITRACAL+D) 315 mg-5 mcg (200 unit) per tablet Take 1 tablet by mouth 2 (two) times daily.       clindamycin (CLEOCIN) 300 MG capsule Take 2 capsules by mouth now, then take 1 capsule 4 times daily until finished  Qty: 28 capsule, Refills: 7      cycloSPORINE (RESTASIS) 0.05 % ophthalmic emulsion Instill one drop into both eyes two times per day  Qty: 180 each, Refills: 3    Associated Diagnoses: Bilateral dry eyes      diazePAM (VALIUM) 10 MG Tab Take 1 tablet (10 mg total) by mouth once as needed.  Qty: 1 tablet, Refills: 0      diclofenac sodium (VOLTAREN) 1 % Gel APPLY 2  GRAMS TOPICALLY 4 (FOUR) TIMES DAILY AS NEEDED.  Strength: 1 %  Qty: 300 g, Refills: 2    Associated Diagnoses: Osteoarthritis, unspecified osteoarthritis type, unspecified site      estrogens, conjugated, (PREMARIN) 0.625 MG tablet take 1 tablet by mouth daily  Qty: 90 tablet, Refills: 4      fluconazole (DIFLUCAN) 100 MG tablet Take 1 tablet by mouth once daily for 14 days.  Qty: 14 tablet, Refills: 0      halobetasol propion-tazarotene (DUOBRII) 0.01-0.045 % Lotn aaa on feet and hands qhs  then vaseline and warm towel  Qty: 100 g, Refills: 3    Associated Diagnoses: Psoriasiform dermatitis      ibuprofen (ADVIL,MOTRIN) 800 MG tablet Take 1 tablet (800 mg total) by mouth every 6 to 8 hours as needed for pain.  Qty: 30 tablet, Refills: 0      INV PROPULSID 10 MG Take 2 tablets (20 mg) by mouth 4 (four) times daily. FOR INVESTIGATIONAL USE ONLY. Protocol [id number]-[id number]  Subject ID: [id number].  Qty: 1440 each, Refills: 0    Associated Diagnoses: Patient in clinical research study; Gastroparesis      ketoconazole (NIZORAL) 2 % cream Apply to feet twice daily for 3 weeks  Qty: 60 g, Refills: 3    Associated Diagnoses: Tinea pedis of both feet      leflunomide (ARAVA) 20 MG Tab TAKE 1 TABLET BY MOUTH EVERY DAY  Qty: 90 tablet, Refills: 0    Associated Diagnoses: Rheumatoid arthritis      lifitegrast (XIIDRA) 5 % Dpet INSTILL ONE DROP INTO BOTH EYES TWICE A DAY  Qty: 60 mL, Refills: 10      magic mouthwash diphen/antac/lidoc Swish and Spit 5 mL by mouth for 30 seconds twice daily.  Qty: 150 mL, Refills: 0      methenamine (HIPREX) 1 gram Tab Take 1 tablet (1 g total) by mouth 2 (two) times daily.  Qty: 180 tablet, Refills: 3      methylPREDNISolone (MEDROL DOSEPACK) 4 mg tablet use as directed on package  Qty: 21 tablet, Refills: 0      mirabegron (MYRBETRIQ) 25 mg Tb24 ER tablet Take 1 tablet (25 mg total) by mouth once daily.  Qty: 90 tablet, Refills: 3    Associated Diagnoses: Urinary urgency; Urge urinary incontinence       montelukast (SINGULAIR) 10 mg tablet TAKE 1 TABLET BY MOUTH EVERY DAY AT NIGHT  Qty: 90 tablet, Refills: 3    Associated Diagnoses: Perennial allergic rhinitis      naproxen (NAPROSYN) 500 MG tablet Take 1 tablet (500 mg total) by mouth 2 (two) times daily as needed.  Qty: 180 tablet, Refills: 0      omeprazole (PRILOSEC) 40 MG capsule Take 1 capsule (40 mg total) by mouth every morning.  Qty: 30 capsule, Refills: 6      omeprazole-sodium bicarbonate (ZEGERID) 40-1.1 mg-gram per capsule TAKE 1 CAPSULE BY MOUTH EVERY DAY IN THE MORNING  Qty: 90 capsule, Refills: 3      POTASSIUM ORAL Take by mouth once daily.      !! predniSONE (DELTASONE) 1 MG tablet Take 5 tablets (5 mg total) by mouth once daily.  Qty: 450 tablet, Refills: 1      !! predniSONE (DELTASONE) 5 MG tablet TAKE 1 TABLET BY MOUTH EVERY DAY  Qty: 90 tablet, Refills: 1      pregabalin (LYRICA) 50 MG capsule Take 3 capsules (150 mg total) by mouth every evening.  Qty: 270 capsule, Refills: 1    Associated Diagnoses: Fibromyalgia      PREMARIN vaginal cream PLACE 0.5 GRAM VAGINALLY 3 (THREE) TIMES A WEEK.  Qty: 30 g, Refills: 1      !! progesterone (PROMETRIUM) 100 MG capsule Take 1 capsule by mouth daily.  Qty: 90 capsule, Refills: 1    Associated Diagnoses: Unspecified ovarian cyst, unspecified side; Other specified counseling      !! progesterone (PROMETRIUM) 100 MG capsule take 1 capsule by mouth daily  Qty: 90 capsule, Refills: 4      promethazine (PHENERGAN) 25 MG tablet Take 1 tablet (25 mg total) by mouth every 6 (six) hours as needed for Nausea.  Qty: 30 tablet, Refills: 1      rosuvastatin (CRESTOR) 20 MG tablet Take 1 tablet (20 mg total) by mouth every evening.  Qty: 90 tablet, Refills: 3    Associated Diagnoses: Coronary artery disease involving native coronary artery of native heart without angina pectoris      sarilumab (KEVZARA) 200 mg/1.14 mL Syrg Inject 1.14 mL (200 mg total) into the skin every 14 (fourteen) days.  Qty: 2.28 mL, Refills:  2    Associated Diagnoses: Rheumatoid arthritis, involving unspecified site, unspecified whether rheumatoid factor present      !! sucralfate (CARAFATE) 1 gram tablet TAKE 1 TABLET (1 G TOTAL) BY MOUTH 4 (FOUR) TIMES DAILY.  Qty: 360 tablet, Refills: 2    Associated Diagnoses: Gastroparesis      !! sucralfate (CARAFATE) 1 gram tablet Take 1 tablet (1 g total) by mouth 4 (four) times daily.  Qty: 360 tablet, Refills: 3    Associated Diagnoses: Gastroparesis      traMADoL (ULTRAM) 50 mg tablet TAKE 1 TABLET (50 MG TOTAL) BY MOUTH EVERY 12 (TWELVE) HOURS AS NEEDED FOR PAIN.  Qty: 60 tablet, Refills: 1    Comments: Quantity prescribed more than 7 day supply? Yes, quantity medically necessary  Associated Diagnoses: Chronic hip pain, left       !! - Potential duplicate medications found. Please discuss with provider.              Discharge Diagnosis: Lumbar radiculopathy [M54.16]  Condition on Discharge: Stable with no complications to procedure   Diet on Discharge: Same as before.  Activity: as per instruction sheet.  Discharge to: Home with a responsible adult.  Follow up: 2-4 weeks       Please call my office or pager at [phone] if experienced any weakness or loss of sensation, fever > 101.5, pain uncontrolled with oral medications, persistent nausea/vomiting/or diarrhea, redness or drainage from the incisions, or any other worrisome concerns. If physician on call was not reached or could not communicate with our office for any reason please go to the nearest emergency department

## 2022-12-12 NOTE — DISCHARGE INSTRUCTIONS
Thank you for allowing us to care for you today. You may receive a survey about the care we provided. Your feedback is valuable and helps us provide excellent care throughout the community.     Home Care Instructions for Pain Management:    1. DIET:   You may resume your normal diet today.   2. BATHING:   You may shower with luke warm water. No tub baths or anything that will soak injection sites under water for the next 24 hours.  3. DRESSING:   You may remove your bandage today.   4. ACTIVITY LEVEL:   You may resume your normal activities 24 hrs after your procedure. Nothing strenuous today.  5. MEDICATIONS:   You may resume your normal medications today. To restart blood thinners, ask your doctor.  6. DRIVING    If you have received any sedatives by mouth today, you may not drive for 12 hours.    If you have received any sedation through your IV, you may not drive for 24 hrs.   7. SPECIAL INSTRUCTIONS:   No heat to the injection site for 24 hrs including, hot bath or shower, heating pad, moist heat, or hot tubs.    Use ice pack to injection site for any pain or discomfort.  Apply ice packs for 20 minute intervals as needed.    IF you have diabetes, be sure to monitor your blood sugar more closely. IF your injection contained steroids your blood sugar levels may become higher than normal.    If you are still having pain upon discharge:  Your pain may improve over the next 48 hours. The anesthetic (numbing medication) works immediately to 48 hours. IF your injection contained a steroid (anti-inflammatory medication), it takes approximately 3 days to start feeling relief and 7-10 days to see your greatest results from the medication. It is possible you may need subsequent injections. This would be discussed at your follow up appointment with pain management or your referring doctor.    Please call the PAIN MANAGEMENT office at [phone] or ON CALL pager at [phone] if you experienced any:   -Weakness or  loss of sensation  -Fever > 101.5  -Pain uncontrolled with oral medications   -Persistent nausea, vomiting, or diarrhea  -Redness or drainage from the injection sites, or any other worrisome concerns.   If physician on call was not reached or could not communicate with our office for any reason please go to the nearest emergency department. Adult Procedural Sedation Instructions    Recovery After Procedural Sedation (Adult)  You have been given medicine by vein to make you sleep during your surgery. This may have included both a pain medicine and sleeping medicine. Most of the effects have worn off. But you may still have some drowsiness for the next 6 to 8 hours.  Home care  Follow these guidelines when you get home:  For the next 8 hours, you should be watched by a responsible adult. This person should make sure your condition is not getting worse.  Don't drink any alcohol for the next 24 hours.  Don't drive, operate dangerous machinery, or make important business or personal decisions during the next 24 hours.  Note: Your healthcare provider may tell you not to take any medicine by mouth for pain or sleep in the next 4 hours. These medicines may react with the medicines you were given in the hospital. This could cause a much stronger response than usual.  Follow-up care  Follow up with your healthcare provider if you are not alert and back to your usual level of activity within 12 hours.  When to seek medical advice  Call your healthcare provider right away if any of these occur:  Drowsiness gets worse  Weakness or dizziness gets worse  Repeated vomiting  You can't be awakened   Date Last Reviewed: 10/18/2016  © 3451-1493 [employer] [address] [place] [address], [place] [address]. All rights reserved. This information is not intended as a substitute for professional medical care. Always follow your healthcare professional's instructions.

## 2022-12-13 ENCOUNTER — TELEPHONE (OUTPATIENT)
Dept: ORTHOPEDICS | Facility: CLINIC | Age: 62
End: 2022-12-13
Payer: MEDICARE

## 2022-12-14 ENCOUNTER — PATIENT MESSAGE (OUTPATIENT)
Dept: GASTROENTEROLOGY | Facility: CLINIC | Age: 62
End: 2022-12-14
Payer: MEDICARE

## 2022-12-14 ENCOUNTER — OFFICE VISIT (OUTPATIENT)
Dept: GASTROENTEROLOGY | Facility: CLINIC | Age: 62
End: 2022-12-14
Payer: MEDICARE

## 2022-12-14 ENCOUNTER — TELEPHONE (OUTPATIENT)
Dept: OBSTETRICS AND GYNECOLOGY | Facility: CLINIC | Age: 62
End: 2022-12-14
Payer: MEDICARE

## 2022-12-14 DIAGNOSIS — K31.84 GASTROPARESIS: Primary | ICD-10-CM

## 2022-12-14 DIAGNOSIS — Z00.6 RESEARCH STUDY PATIENT: ICD-10-CM

## 2022-12-14 DIAGNOSIS — M21.42 PES PLANOVALGUS, ACQUIRED, LEFT: Primary | ICD-10-CM

## 2022-12-14 DIAGNOSIS — K21.9 GASTROESOPHAGEAL REFLUX DISEASE WITHOUT ESOPHAGITIS: ICD-10-CM

## 2022-12-14 DIAGNOSIS — M19.072 DEGENERATIVE JOINT DISEASE OF HINDFOOT, LEFT: ICD-10-CM

## 2022-12-14 PROCEDURE — 1159F MED LIST DOCD IN RCRD: CPT | Mod: CPTII,95,, | Performed by: INTERNAL MEDICINE

## 2022-12-14 PROCEDURE — 99213 PR OFFICE/OUTPT VISIT, EST, LEVL III, 20-29 MIN: ICD-10-PCS | Mod: 95,,, | Performed by: INTERNAL MEDICINE

## 2022-12-14 PROCEDURE — 1159F PR MEDICATION LIST DOCUMENTED IN MEDICAL RECORD: ICD-10-PCS | Mod: CPTII,95,, | Performed by: INTERNAL MEDICINE

## 2022-12-14 PROCEDURE — 3044F HG A1C LEVEL LT 7.0%: CPT | Mod: CPTII,95,, | Performed by: INTERNAL MEDICINE

## 2022-12-14 PROCEDURE — 99213 OFFICE O/P EST LOW 20 MIN: CPT | Mod: 95,,, | Performed by: INTERNAL MEDICINE

## 2022-12-14 PROCEDURE — 1160F PR REVIEW ALL MEDS BY PRESCRIBER/CLIN PHARMACIST DOCUMENTED: ICD-10-PCS | Mod: CPTII,95,, | Performed by: INTERNAL MEDICINE

## 2022-12-14 PROCEDURE — 3044F PR MOST RECENT HEMOGLOBIN A1C LEVEL <7.0%: ICD-10-PCS | Mod: CPTII,95,, | Performed by: INTERNAL MEDICINE

## 2022-12-14 PROCEDURE — 1160F RVW MEDS BY RX/DR IN RCRD: CPT | Mod: CPTII,95,, | Performed by: INTERNAL MEDICINE

## 2022-12-14 NOTE — TELEPHONE ENCOUNTER
Spoke with patient.  Her 4:30 appointment for today with  has been changed to virtual visit for 10:30 today.  Delilah

## 2022-12-14 NOTE — PROGRESS NOTES
The patient location is: home  The chief complaint leading to consultation is: f.u per protocol    Visit type: audiovisual    Face to Face time with patient: 15 min  25 minutes of total time spent on the encounter, which includes face to face time and non-face to face time preparing to see the patient (eg, review of tests), Obtaining and/or reviewing separately obtained history, Documenting clinical information in the electronic or other health record, Independently interpreting results (not separately reported) and communicating results to the patient/family/caregiver, or Care coordination (not separately reported).         Each patient to whom he or she provides medical services by telemedicine is:  (1) informed of the relationship between the physician and patient and the respective role of any other health care provider with respect to management of the patient; and (2) notified that he or she may decline to receive medical services by telemedicine and may withdraw from such care at any time.    Notes:    Follow-up visit per the compassionate trial of Propulsid.  Longstanding gastroparesis with related problems of reflux.  She is been on the propulsive for number years.  In addition takes omeprazole for the reflux.  Overall she is doing well.  No episodes of nausea vomiting.  No severe fullness or discomfort.  No abdominal pain.  She only has reflux breakthrough symptoms after certain foods.  For instance last night she had tomato gravy and was having reflux after that.  But for the most part if she is adherent to her diet she does not have reflux symptoms.  No dysphagia.  Bowel movements normal.  No issues.    Most of her main other issues revolve around joint problems.  She has upcoming foot surgery.  She is having some shoulder issues as well.    I reviewed some of the medications that can sometimes post problems by interaction with Propulsid  She does always stop the Propulsid when she has to take  Diflucan.  Rarely does she use albuterol   She is been using the cyclosporine eye drops for years without problems  She is been taking the Prilosec for years without problems  Rarely does use Phenergan   She has been using tramadol again years without having any problems    She denies any palpitations syncope, or rapid heart rate.    Assessment  One gastroparesis will continue Propulsid  2. Gastroesophageal reflux we will continue the omeprazole  3. Participation in research study.  I will review the EKG specifically the QT interval, and all her labs.  She is deriving benefit from the cisapride and wants to continue.  She knows to get in touch with me right away with any new medications that might be used that could have interaction with Propulsid    This note was created with voice recognition dictation technology.  There may be errors that I did not see, detect or correct.

## 2022-12-14 NOTE — TELEPHONE ENCOUNTER
----- Message from Chandni Rachel sent at 12/14/2022  9:27 AM CST -----  Regarding: self  [phone]  .Type: Patient Call Back    Who called: self    What is the request in detail: Pt stated that she's still waiting for her [facility] appt scheduled. Please call pt to get matter taken care of     Can the clinic reply by [facility]? Call back    Would the patient rather a call back or a response via [facility]? Call back    Best call back number: [phone]

## 2022-12-15 ENCOUNTER — OFFICE VISIT (OUTPATIENT)
Dept: ORTHOPEDICS | Facility: CLINIC | Age: 62
End: 2022-12-15
Payer: MEDICARE

## 2022-12-15 VITALS
BODY MASS INDEX: 27 KG/M2 | HEIGHT: 61 IN | SYSTOLIC BLOOD PRESSURE: 122 MMHG | HEART RATE: 94 BPM | DIASTOLIC BLOOD PRESSURE: 81 MMHG | WEIGHT: 143 LBS

## 2022-12-15 DIAGNOSIS — G89.29 CHRONIC RIGHT SHOULDER PAIN: Primary | ICD-10-CM

## 2022-12-15 DIAGNOSIS — M25.511 CHRONIC RIGHT SHOULDER PAIN: Primary | ICD-10-CM

## 2022-12-15 PROCEDURE — 99214 PR OFFICE/OUTPT VISIT, EST, LEVL IV, 30-39 MIN: ICD-10-PCS | Mod: S$GLB,,, | Performed by: ORTHOPAEDIC SURGERY

## 2022-12-15 PROCEDURE — 3079F DIAST BP 80-89 MM HG: CPT | Mod: CPTII,S$GLB,, | Performed by: ORTHOPAEDIC SURGERY

## 2022-12-15 PROCEDURE — 3079F PR MOST RECENT DIASTOLIC BLOOD PRESSURE 80-89 MM HG: ICD-10-PCS | Mod: CPTII,S$GLB,, | Performed by: ORTHOPAEDIC SURGERY

## 2022-12-15 PROCEDURE — 3008F BODY MASS INDEX DOCD: CPT | Mod: CPTII,S$GLB,, | Performed by: ORTHOPAEDIC SURGERY

## 2022-12-15 PROCEDURE — 1159F PR MEDICATION LIST DOCUMENTED IN MEDICAL RECORD: ICD-10-PCS | Mod: CPTII,S$GLB,, | Performed by: ORTHOPAEDIC SURGERY

## 2022-12-15 PROCEDURE — 3008F PR BODY MASS INDEX (BMI) DOCUMENTED: ICD-10-PCS | Mod: CPTII,S$GLB,, | Performed by: ORTHOPAEDIC SURGERY

## 2022-12-15 PROCEDURE — 3044F HG A1C LEVEL LT 7.0%: CPT | Mod: CPTII,S$GLB,, | Performed by: ORTHOPAEDIC SURGERY

## 2022-12-15 PROCEDURE — 99999 PR PBB SHADOW E&M-EST. PATIENT-LVL IV: CPT | Mod: PBBFAC,,, | Performed by: ORTHOPAEDIC SURGERY

## 2022-12-15 PROCEDURE — 1159F MED LIST DOCD IN RCRD: CPT | Mod: CPTII,S$GLB,, | Performed by: ORTHOPAEDIC SURGERY

## 2022-12-15 PROCEDURE — 3074F PR MOST RECENT SYSTOLIC BLOOD PRESSURE < 130 MM HG: ICD-10-PCS | Mod: CPTII,S$GLB,, | Performed by: ORTHOPAEDIC SURGERY

## 2022-12-15 PROCEDURE — 99214 OFFICE O/P EST MOD 30 MIN: CPT | Mod: S$GLB,,, | Performed by: ORTHOPAEDIC SURGERY

## 2022-12-15 PROCEDURE — 3044F PR MOST RECENT HEMOGLOBIN A1C LEVEL <7.0%: ICD-10-PCS | Mod: CPTII,S$GLB,, | Performed by: ORTHOPAEDIC SURGERY

## 2022-12-15 PROCEDURE — 99999 PR PBB SHADOW E&M-EST. PATIENT-LVL IV: ICD-10-PCS | Mod: PBBFAC,,, | Performed by: ORTHOPAEDIC SURGERY

## 2022-12-15 PROCEDURE — 3074F SYST BP LT 130 MM HG: CPT | Mod: CPTII,S$GLB,, | Performed by: ORTHOPAEDIC SURGERY
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
## 2022-12-20 NOTE — PROGRESS NOTES
Orthopedic Surgery Preoperative H&P  DOS: 01/11/2023    Subjective:   HPI:   Joyce Peterson is a 62 y.o. female who presents today for pre-operative examination.  She is scheduled for left triple arthrodesis, medial column arthrodesis, ARLEN, calcaneal bone graft with Dr. Finnegan.   She was last seen and treated in the clinic on 11/10/22. There has been no significant change in their past medical or orthopedic status since the previous visit.  No fever, chills, malaise or unexplained weight change      Objective:   Exam:  Vitals:    12/22/22 0912   BP: 138/78   Pulse: 89     General: No acute distress, well-appearing  Heart: Regular pulse  Lungs: Breathing comfortably on RA  Vascular: 2+ DP  Musculoskeletal: Left foot skin intact, no erythema or new rashes or lesions    Imaging:  All required preoperative imaging is complete and up to date  CT done    Preoperative screening and testing results:  Labs and/or EKG: not applicable  Primary care provider: not applicable  Rheumatology medication recs: will reach out to Dr. Luisa MARTINEZ preop center: scheduled next week  Cardiology: not applicable  Vascular specialist: scheduled  appointment 1/4/23.  U/S done 12/21/22    Medication list reviewed in [facility] and with patient and demonstrates there are not blood thinners on their list.    Assessment:     1. Pes planovalgus, acquired, left    2. Degenerative joint disease of hindfoot, left    3. Equinus contracture of left ankle         Plan:     All surgical paperwork completed and reviewed today with patient and consent was signed.  Chlorhexidine soap with instructions provided today as well as preoperative information.  Postoperative pain management reviewed per protocol:  Narcotic: oxycodone #30  NSAIDS:  avoid  APAP: 1000mg TID x 14 days  DVT prophylaxis: Recommend aspirin 81mg twice daily or 325 daily starting after surgery until fully weightbearing  Other medications:  Lyrica- already taking this, ok to continue for post  op pain .  Phenergan (patient allergic to zofran), and robaxin  Bone meds: Vitamin D 4000 IU daily x3 months   Prescriptions sent to [facility] pharmacy  Postoperative activity and DME needs reviewed  Non-weight bearing for 6 weeks on the left lower extremity   DME needs discussed:  crutches, wheelchair and rollator  and already have  Discharge plans discussed: Overnight observation (home the next day after physical therapy sees you)          No orders of the defined types were placed in this encounter.      Past Medical History:   Diagnosis Date    Acid reflux     Allergy     Anemia     Asthma     Coronary artery disease     CS (cervical spondylosis) 3/8/2013    Degenerative disc disease     Dry eyes     Dry mouth     Gastroparesis     History of methotrexate therapy 1/19/2022    Hyperlipidemia     Lateral meniscus derangement 4/6/2016    Lobular carcinoma in situ     Lumbar spondylosis 3/8/2013    Osteoarthritis     Osteoporosis     Rheumatoid arthritis(714.0)     Rupture of left triceps tendon 10/17/2018    Umbilical hernia 8/13/2015       Past Surgical History:   Procedure Laterality Date    BREAST BIOPSY Left 01/29/2002    core bx    CARPAL TUNNEL RELEASE Right 05/2017    CHOLECYSTECTOMY  2004    COLONOSCOPY      10/11    COLONOSCOPY N/A 6/29/2017    Procedure: COLONOSCOPY;  Surgeon: López Moore MD;  [facility]  Service: Endoscopy;  Laterality: N/A;    EPIDURAL STEROID INJECTION N/A 11/18/2021    Procedure: INJECTION, STEROID, EPIDURAL, L5-S1 IL NEED CONSENT;  Surgeon: Tj Mayfield MD;  Location: [facility] PAIN MGT;  Service: Pain Management;  Laterality: N/A;    EPIDURAL STEROID INJECTION N/A 9/29/2022    Procedure: LUMBAR L3/L4 IL MADELINE CONTRAST;  Surgeon: Tj Mayfield MD;  Location: [facility] PAIN MGT;  Service: Pain Management;  Laterality: N/A;    EPIDURAL STEROID INJECTION N/A 12/12/2022    Procedure: INJECTION, STEROID, EPIDURAL L5/S1 IL CONTRAST;  Surgeon: Tj Mayfield MD;  Location: [facility] PAIN  MGT;  Service: Pain Management;  Laterality: N/A;    INJECTION OF ANESTHETIC AGENT AROUND NERVE Bilateral 8/23/2021    Procedure: BLOCK, NERVE, MEDIAL BRANCH L3,L4,L5;  Surgeon: Tj Mayfield MD;  Location: [facility] PAIN MGT;  Service: Pain Management;  Laterality: Bilateral;  1 of 2    INJECTION OF ANESTHETIC AGENT AROUND NERVE Bilateral 8/26/2021    Procedure: BLOCK, NERVE, MEDIAL BRANCH L3,L4,L5;  Surgeon: Tj Mayfield MD;  Location: [facility] PAIN MGT;  Service: Pain Management;  Laterality: Bilateral;  2 of 2    INJECTION OF ANESTHETIC AGENT AROUND NERVE Left 7/7/2022    Procedure: BLOCK, NERVE, LEFT OBTURATOR AND FEMORAL;  Surgeon: Tj Mayfield MD;  Location: [facility] PAIN MGT;  Service: Pain Management;  Laterality: Left;    INJECTION OF FACET JOINT Bilateral 3/12/2020    Procedure: FACET JOINT INJECTION (LUMBAR BLOCK) BILATERAL L4-5 AND L5-S1 DIRECT REFERRAL;  Surgeon: Tj Mayfield MD;  Location: [facility] PAIN MGT;  Service: Pain Management;  Laterality: Bilateral;  NEEDS CONSENT    INJECTION OF FACET JOINT Bilateral 3/29/2021    Procedure: INJECTION, FACET JOINT L3/4, L4/5, L5/S1;  Surgeon: Tj Mayfield MD;  Location: [facility] PAIN MGT;  Service: Pain Management;  Laterality: Bilateral;    INJECTION OF JOINT Right 7/30/2020    Procedure: INJECTION, JOINT, RIGHT HIP Interarticular under flouro;  Surgeon: Tj Mayfield MD;  Location: [facility] PAIN MGT;  Service: Pain Management;  Laterality: Right;  INJECTION, JOINT, RIGHT HIP Interarticular under flouro    INJECTION OF JOINT Right 2/21/2022    Procedure: Injection, Joint RIGHT ISCHIAL BURSA;  Surgeon: Tj Mayfield MD;  Location: [facility] PAIN MGT;  Service: Pain Management;  Laterality: Right;    INTRAMEDULLARY RODDING OF FEMUR Left 5/21/2019    Procedure: INSERTION, INTRAMEDULLARY ARIEL, FEMUR;  Surgeon: López Duff MD;  Location: [facility] OR [address] [place] [address];  Service: Orthopedics;  Laterality: Left;    RADIOFREQUENCY ABLATION Left 9/20/2021    Procedure: RADIOFREQUENCY ABLATION left L3,4,5 RFA   1 of 2  CONSENT NEEDED;  Surgeon: Tj Mayfield MD;  Location: [facility] PAIN MGT;  Service: Pain Management;  Laterality: Left;    RADIOFREQUENCY ABLATION Right 10/4/2021    Procedure: RADIOFREQUENCY ABLATION  right L3,4,5 RFA   2 of 2  CONSENT NEEDED;  Surgeon: Tj Mayfield MD;  Location: [facility] PAIN MGT;  Service: Pain Management;  Laterality: Right;    RADIOFREQUENCY ABLATION Left 4/21/2022    Procedure: Radiofrequency Ablation LEFT L3,L4,L5;  Surgeon: Tj Mayfield MD;  Location: [facility] PAIN MGT;  Service: Pain Management;  Laterality: Left;    RADIOFREQUENCY ABLATION Right 5/12/2022    Procedure: Radiofrequency Ablation RIGHT L3,L4,L5;  Surgeon: Tj Mayfield MD;  Location: [facility] PAIN MGT;  Service: Pain Management;  Laterality: Right;    RADIOFREQUENCY ABLATION Left 7/25/2022    Procedure: Radiofrequency Ablation Left Femoral & Obturator;  Surgeon: Tj Mayfield MD;  Location: [facility] PAIN MGT;  Service: Pain Management;  Laterality: Left;    REPAIR OF TRICEPS TENDON Left 10/17/2018    Procedure: REPAIR, TENDON, TRICEPS left elbow;  Surgeon: Staci Yarbrough MD;  Location: [facility] OR [address] [place] [address];  Service: Orthopedics;  Laterality: Left;  Anesthesia: General and regional. PRONE, k-wire , hand pan 1 and pan 2, CALL ARTHREX/Tamera notified 10-12 LO    TONSILLECTOMY      TRANSFORAMINAL EPIDURAL INJECTION OF STEROID Right 8/31/2020    Procedure: INJECTION, STEROID, EPIDURAL, TRANSFORAMINAL,  APPROACH, L3-L4 and L4-L5 need consent;  Surgeon: Tj Mayfield MD;  Location: [facility] PAIN MGT;  Service: Pain Management;  Laterality: Right;    TRANSFORAMINAL EPIDURAL INJECTION OF STEROID Bilateral 7/23/2021    Procedure: INJECTION, STEROID, EPIDURAL, TRANSFORAMINAL APPROACH L4/5;  Surgeon: Tj Mayfield MD;  Location: [facility] PAIN MGT;  Service: Pain Management;  Laterality: Bilateral;    TRIGGER POINT INJECTION N/A 7/23/2021    Procedure: INJECTION, TRIGGER POINT SCAPULAR;  Surgeon: Tj Mayfield MD;  [facility];  Service: Pain  Management;  Laterality: N/A;    TUBAL LIGATION  2003    UPPER GASTROINTESTINAL ENDOSCOPY      10/11    uterine ablation  2003       Family History   Problem Relation Age of Onset    Cancer Mother         Lung Cancer    Emphysema Mother     Heart attack Mother     Alcohol abuse Mother     Cancer Father         Lung Cancer    Osteoarthritis Father     Lung cancer Father     Skin cancer Father     Alcohol abuse Father     Heart disease Brother     Heart attack Brother     Alcohol abuse Brother     Cirrhosis Brother     Osteoarthritis Paternal Aunt     Retinal detachment Maternal Aunt     No Known Problems Sister     No Known Problems Maternal Uncle     No Known Problems Paternal Uncle     No Known Problems Maternal Grandmother     No Known Problems Maternal Grandfather     No Known Problems Paternal Grandmother     No Known Problems Paternal Grandfather     Colon cancer Neg Hx     Esophageal cancer Neg Hx     Stomach cancer Neg Hx     Celiac disease Neg Hx     Diabetes Neg Hx     Thyroid disease Neg Hx     Amblyopia Neg Hx     Blindness Neg Hx     Cataracts Neg Hx     Glaucoma Neg Hx     Hypertension Neg Hx     Macular degeneration Neg Hx     Strabismus Neg Hx     Stroke Neg Hx        Social History     Socioeconomic History    Marital status:    Tobacco Use    Smoking status: Never    Smokeless tobacco: Never   Substance and Sexual Activity    Alcohol use: Yes     Comment: 2-3 times per year.    Drug use: No    Sexual activity: Yes     Partners: Male     Birth control/protection: Surgical     Social Determinants of Health     Financial Resource Strain: Low Risk     Difficulty of Paying Living Expenses: Not hard at all   Food Insecurity: Unknown    Worried About Running Out of Food in the Last Year: Patient refused    Ran Out of Food in the Last Year: Patient refused   Transportation Needs: Unknown    Lack of Transportation (Medical): Patient refused    Lack of Transportation (Non-Medical): Patient refused    Physical Activity: Unknown    Days of Exercise per Week: 2 days   Stress: No Stress Concern Present    Feeling of Stress : Not at all   Social Connections: Unknown    Frequency of Communication with Friends and Family: Twice a week    Frequency of Social Gatherings with Friends and Family: Twice a week    Active Member of Clubs or Organizations: Yes    Attends Club or Organization Meetings: Patient refused    Marital Status:    Housing Stability: Low Risk     Unable to Pay for Housing in the Last Year: No    Number of Places Lived in the Last Year: 1    Unstable Housing in the Last Year: No              no

## 2022-12-21 ENCOUNTER — HOSPITAL ENCOUNTER (OUTPATIENT)
Dept: RADIOLOGY | Facility: HOSPITAL | Age: 62
Discharge: HOME | End: 2022-12-21
Attending: SURGERY
Payer: MEDICARE

## 2022-12-21 DIAGNOSIS — I87.2 VENOUS INSUFFICIENCY: ICD-10-CM

## 2022-12-21 PROCEDURE — 93970 US LOWER EXTREMITY VEINS BILATERAL INSUFFICIENCY: ICD-10-PCS | Mod: 26,,, | Performed by: RADIOLOGY

## 2022-12-21 PROCEDURE — 93970 EXTREMITY STUDY: CPT | Mod: TC

## 2022-12-21 PROCEDURE — 93970 EXTREMITY STUDY: CPT | Mod: 26,,, | Performed by: RADIOLOGY

## 2022-12-22 ENCOUNTER — PATIENT MESSAGE (OUTPATIENT)
Dept: ORTHOPEDICS | Facility: CLINIC | Age: 62
End: 2022-12-22

## 2022-12-22 ENCOUNTER — PATIENT MESSAGE (OUTPATIENT)
Dept: RHEUMATOLOGY | Facility: CLINIC | Age: 62
End: 2022-12-22
Payer: MEDICARE

## 2022-12-22 ENCOUNTER — OFFICE VISIT (OUTPATIENT)
Dept: ORTHOPEDICS | Facility: CLINIC | Age: 62
End: 2022-12-22
Payer: MEDICARE

## 2022-12-22 VITALS
BODY MASS INDEX: 26.22 KG/M2 | HEIGHT: 61 IN | SYSTOLIC BLOOD PRESSURE: 138 MMHG | WEIGHT: 138.88 LBS | DIASTOLIC BLOOD PRESSURE: 78 MMHG | HEART RATE: 89 BPM

## 2022-12-22 DIAGNOSIS — M19.072 DEGENERATIVE JOINT DISEASE OF HINDFOOT, LEFT: ICD-10-CM

## 2022-12-22 DIAGNOSIS — M21.42 PES PLANOVALGUS, ACQUIRED, LEFT: Primary | ICD-10-CM

## 2022-12-22 DIAGNOSIS — M24.572 EQUINUS CONTRACTURE OF LEFT ANKLE: ICD-10-CM

## 2022-12-22 PROCEDURE — 3044F HG A1C LEVEL LT 7.0%: CPT | Mod: CPTII,S$GLB,, | Performed by: PHYSICIAN ASSISTANT

## 2022-12-22 PROCEDURE — 3044F PR MOST RECENT HEMOGLOBIN A1C LEVEL <7.0%: ICD-10-PCS | Mod: CPTII,S$GLB,, | Performed by: PHYSICIAN ASSISTANT

## 2022-12-22 PROCEDURE — 3078F PR MOST RECENT DIASTOLIC BLOOD PRESSURE < 80 MM HG: ICD-10-PCS | Mod: CPTII,S$GLB,, | Performed by: PHYSICIAN ASSISTANT

## 2022-12-22 PROCEDURE — 3008F PR BODY MASS INDEX (BMI) DOCUMENTED: ICD-10-PCS | Mod: CPTII,S$GLB,, | Performed by: PHYSICIAN ASSISTANT

## 2022-12-22 PROCEDURE — 1159F PR MEDICATION LIST DOCUMENTED IN MEDICAL RECORD: ICD-10-PCS | Mod: CPTII,S$GLB,, | Performed by: PHYSICIAN ASSISTANT

## 2022-12-22 PROCEDURE — 99999 PR PBB SHADOW E&M-EST. PATIENT-LVL IV: ICD-10-PCS | Mod: PBBFAC,,, | Performed by: PHYSICIAN ASSISTANT

## 2022-12-22 PROCEDURE — 1159F MED LIST DOCD IN RCRD: CPT | Mod: CPTII,S$GLB,, | Performed by: PHYSICIAN ASSISTANT

## 2022-12-22 PROCEDURE — 3075F PR MOST RECENT SYSTOLIC BLOOD PRESS GE 130-139MM HG: ICD-10-PCS | Mod: CPTII,S$GLB,, | Performed by: PHYSICIAN ASSISTANT

## 2022-12-22 PROCEDURE — 99999 PR PBB SHADOW E&M-EST. PATIENT-LVL IV: CPT | Mod: PBBFAC,,, | Performed by: PHYSICIAN ASSISTANT

## 2022-12-22 PROCEDURE — 3008F BODY MASS INDEX DOCD: CPT | Mod: CPTII,S$GLB,, | Performed by: PHYSICIAN ASSISTANT

## 2022-12-22 PROCEDURE — 1160F RVW MEDS BY RX/DR IN RCRD: CPT | Mod: CPTII,S$GLB,, | Performed by: PHYSICIAN ASSISTANT

## 2022-12-22 PROCEDURE — 3078F DIAST BP <80 MM HG: CPT | Mod: CPTII,S$GLB,, | Performed by: PHYSICIAN ASSISTANT

## 2022-12-22 PROCEDURE — 99499 NO LOS: ICD-10-PCS | Mod: S$GLB,,, | Performed by: PHYSICIAN ASSISTANT

## 2022-12-22 PROCEDURE — 1160F PR REVIEW ALL MEDS BY PRESCRIBER/CLIN PHARMACIST DOCUMENTED: ICD-10-PCS | Mod: CPTII,S$GLB,, | Performed by: PHYSICIAN ASSISTANT

## 2022-12-22 PROCEDURE — 99499 UNLISTED E&M SERVICE: CPT | Mod: S$GLB,,, | Performed by: PHYSICIAN ASSISTANT

## 2022-12-22 PROCEDURE — 3075F SYST BP GE 130 - 139MM HG: CPT | Mod: CPTII,S$GLB,, | Performed by: PHYSICIAN ASSISTANT

## 2022-12-22 RX ORDER — OXYCODONE HYDROCHLORIDE 5 MG/1
5 TABLET ORAL EVERY 4 HOURS PRN
Qty: 30 TABLET | Refills: 0 | Status: SHIPPED | OUTPATIENT
Start: 2022-12-22 | End: 2023-01-17 | Stop reason: SDUPTHER

## 2022-12-22 RX ORDER — PROMETHAZINE HYDROCHLORIDE 25 MG/1
25 TABLET ORAL EVERY 6 HOURS PRN
Qty: 15 TABLET | Refills: 0 | Status: SHIPPED | OUTPATIENT
Start: 2022-12-22

## 2022-12-22 RX ORDER — ACETAMINOPHEN 500 MG
1000 TABLET ORAL 3 TIMES DAILY
Qty: 42 TABLET | Refills: 0 | Status: SHIPPED | OUTPATIENT
Start: 2022-12-22 | End: 2023-01-17 | Stop reason: SDUPTHER

## 2022-12-22 RX ORDER — CEFADROXIL 500 MG/1
500 CAPSULE ORAL EVERY 12 HOURS
Qty: 20 CAPSULE | Refills: 0 | Status: SHIPPED | OUTPATIENT
Start: 2022-12-22 | End: 2023-01-22

## 2022-12-22 RX ORDER — METHOCARBAMOL 750 MG/1
750 TABLET, FILM COATED ORAL 3 TIMES DAILY
Qty: 42 TABLET | Refills: 0 | Status: SHIPPED | OUTPATIENT
Start: 2022-12-22 | End: 2023-01-25 | Stop reason: SDUPTHER

## 2022-12-22 NOTE — PATIENT INSTRUCTIONS
Today you had your preoperative appointment for surgery with Dr. Finnegan.  You were given a folder, and I encourage you to bring that with you and any additional questions WRITTEN DOWN on the day of surgery.        In preparation for surgery, we went over the following today.      Showering instructions (Hibiclens soap)  You will clean twice with this soap. The first shower should be taken the night before your surgery/procedure and the second shower the morning of surgery/procedure.  Do not shave the area of your body where your surgery will be performed. Any new cut, abrasion or rash on your surgical site will need to be evaluated and may cause a delay in your procedure.  Night before surgery: Remove any nail polish on surgical leg and clean under nails.  Clean neck to toe with half the bottle of soap.  Use freshly laundered bed linens.  Morning of surgery: Clean groin to toe of the surgical leg with remaining soap.  Pat yourself dry with a fresh, clean, soft towel after each shower. Put on clean clothes or pajamas. Do not apply any lotions, perfumes or powders after use.  Medication instructions:  REGARDLESS of what anyone tells you, you can continue aspirin products up to and after surgery.  Other blood thinner instructions: none.  You can take your regular non-diabetic medications with A SIP of water.  If you are diabetic, you should receive a phone call with instructions about your diabetes medications.  Day of surgery  Arrive ON TIME and nothing to eat or drink after midnight.    Your surgery would be Overnight observation (home the next day after physical therapy sees you).  Your postoperative medications will be delivered to you after surgery.  Activity after surgery  You will be Non-weight bearing for 6 weeks on the left lower extremity  You be in splint for 2 weeks, then cast for 4 weeks, and then boot until 12 weeks postop  Surgery is on your non-driving foot.  You will not be able to drive until off  narcotic pain medications and until fully weight bearing on operative extremity.  Ultimately the decision about when to drive is a personal choice based on your assessment of your own ability/safety for operating a motor vehicle.  If applicable, return to work should have already been discussed with Dr. Finnegan.  Paperwork status reviewed: not applicable.

## 2022-12-22 NOTE — H&P (VIEW-ONLY)
Subjective:      Patient ID: Joyce Peterson is a 62 y.o. female.    Chief Complaint: Left left foot pain    Joyce Peterson is a 62 y.o. female who presents today for pre-operative examination.  She is scheduled for left triple arthrodesis, medial column arthrodesis, ARLEN, calcaneal bone graft with Dr. Finnegan.   She was last seen and treated in the clinic on 11/10/22. There has been no significant change in their past medical or orthopedic status since the previous visit.  No fever, chills, malaise or unexplained weight change       Past Medical History:   Diagnosis Date    Acid reflux     Allergy     Anemia     Asthma     Coronary artery disease     CS (cervical spondylosis) 3/8/2013    Degenerative disc disease     Dry eyes     Dry mouth     Gastroparesis     History of methotrexate therapy 1/19/2022    Hyperlipidemia     Lateral meniscus derangement 4/6/2016    Lobular carcinoma in situ     Lumbar spondylosis 3/8/2013    Osteoarthritis     Osteoporosis     Rheumatoid arthritis(714.0)     Rupture of left triceps tendon 10/17/2018    Umbilical hernia 8/13/2015     Past Surgical History:   Procedure Laterality Date    BREAST BIOPSY Left 01/29/2002    core bx    CARPAL TUNNEL RELEASE Right 05/2017    CHOLECYSTECTOMY  2004    COLONOSCOPY      10/11    COLONOSCOPY N/A 6/29/2017    Procedure: COLONOSCOPY;  Surgeon: López Moore MD;  Location: [facility] ([address] [place] [address]);  Service: Endoscopy;  Laterality: N/A;    EPIDURAL STEROID INJECTION N/A 11/18/2021    Procedure: INJECTION, STEROID, EPIDURAL, L5-S1 IL NEED CONSENT;  Surgeon: Tj Mayfield MD;  Location: [facility];  Service: Pain Management;  Laterality: N/A;    EPIDURAL STEROID INJECTION N/A 9/29/2022    Procedure: LUMBAR L3/L4 IL MADELINE CONTRAST;  Surgeon: Tj Mayfield MD;  Location: [facility];  Service: Pain Management;  Laterality: N/A;    EPIDURAL STEROID INJECTION N/A 12/12/2022    Procedure: INJECTION, STEROID, EPIDURAL L5/S1 IL CONTRAST;  Surgeon: Tj  MD Tran;  Location: [facility];  Service: Pain Management;  Laterality: N/A;    INJECTION OF ANESTHETIC AGENT AROUND NERVE Bilateral 8/23/2021    Procedure: BLOCK, NERVE, MEDIAL BRANCH L3,L4,L5;  Surgeon: Tj Mayfield MD;  Location: [facility];  Service: Pain Management;  Laterality: Bilateral;  1 of 2    INJECTION OF ANESTHETIC AGENT AROUND NERVE Bilateral 8/26/2021    Procedure: BLOCK, NERVE, MEDIAL BRANCH L3,L4,L5;  Surgeon: Tj Mayfield MD;  Location: [facility];  Service: Pain Management;  Laterality: Bilateral;  2 of 2    INJECTION OF ANESTHETIC AGENT AROUND NERVE Left 7/7/2022    Procedure: BLOCK, NERVE, LEFT OBTURATOR AND FEMORAL;  Surgeon: Tj Mayfield MD;  Location: [facility];  Service: Pain Management;  Laterality: Left;    INJECTION OF FACET JOINT Bilateral 3/12/2020    Procedure: FACET JOINT INJECTION (LUMBAR BLOCK) BILATERAL L4-5 AND L5-S1 DIRECT REFERRAL;  Surgeon: Tj Mayfield MD;  Location: [facility];  Service: Pain Management;  Laterality: Bilateral;  NEEDS CONSENT    INJECTION OF FACET JOINT Bilateral 3/29/2021    Procedure: INJECTION, FACET JOINT L3/4, L4/5, L5/S1;  Surgeon: Tj Mayfield MD;  Location: [facility];  Service: Pain Management;  Laterality: Bilateral;    INJECTION OF JOINT Right 7/30/2020    Procedure: INJECTION, JOINT, RIGHT HIP Interarticular under flouro;  Surgeon: Tj Mayfield MD;  Location: [facility];  Service: Pain Management;  Laterality: Right;  INJECTION, JOINT, RIGHT HIP Interarticular under flouro    INJECTION OF JOINT Right 2/21/2022    Procedure: Injection, Joint RIGHT ISCHIAL BURSA;  Surgeon: Tj Mayfield MD;  Location: [facility];  Service: Pain Management;  Laterality: Right;    INTRAMEDULLARY RODDING OF FEMUR Left 5/21/2019    Procedure: INSERTION, INTRAMEDULLARY ARIEL, FEMUR;  Surgeon: López Duff MD;  [facility];  Service: Orthopedics;  Laterality: Left;    RADIOFREQUENCY ABLATION Left 9/20/2021    Procedure:  RADIOFREQUENCY ABLATION left L3,4,5 RFA  1 of 2  CONSENT NEEDED;  Surgeon: Tj Mayfield MD;  Location: [facility];  Service: Pain Management;  Laterality: Left;    RADIOFREQUENCY ABLATION Right 10/4/2021    Procedure: RADIOFREQUENCY ABLATION  right L3,4,5 RFA   2 of 2  CONSENT NEEDED;  Surgeon: Tj Mayfield MD;  Location: [facility];  Service: Pain Management;  Laterality: Right;    RADIOFREQUENCY ABLATION Left 4/21/2022    Procedure: Radiofrequency Ablation LEFT L3,L4,L5;  Surgeon: Tj Mayfield MD;  Location: [facility];  Service: Pain Management;  Laterality: Left;    RADIOFREQUENCY ABLATION Right 5/12/2022    Procedure: Radiofrequency Ablation RIGHT L3,L4,L5;  Surgeon: Tj Mayfield MD;  Location: [facility];  Service: Pain Management;  Laterality: Right;    RADIOFREQUENCY ABLATION Left 7/25/2022    Procedure: Radiofrequency Ablation Left Femoral & Obturator;  Surgeon: Tj Mayfield MD;  Location: [facility];  Service: Pain Management;  Laterality: Left;    REPAIR OF TRICEPS TENDON Left 10/17/2018    Procedure: REPAIR, TENDON, TRICEPS left elbow;  Surgeon: Staci Yarbrough MD;  Location: [facility] OR [address] [place] [address];  Service: Orthopedics;  Laterality: Left;  Anesthesia: General and regional. PRONE, k-wire , hand pan 1 and pan 2, CALL ARTHREX/Tamera notified 10-12 LO    TONSILLECTOMY      TRANSFORAMINAL EPIDURAL INJECTION OF STEROID Right 8/31/2020    Procedure: INJECTION, STEROID, EPIDURAL, TRANSFORAMINAL,  APPROACH, L3-L4 and L4-L5 need consent;  Surgeon: Tj Mayfield MD;  Location: [facility];  Service: Pain Management;  Laterality: Right;    TRANSFORAMINAL EPIDURAL INJECTION OF STEROID Bilateral 7/23/2021    Procedure: INJECTION, STEROID, EPIDURAL, TRANSFORAMINAL APPROACH L4/5;  Surgeon: Tj Mayfield MD;  Location: [facility];  Service: Pain Management;  Laterality: Bilateral;    TRIGGER POINT INJECTION N/A 7/23/2021    Procedure: INJECTION, TRIGGER POINT SCAPULAR;  Surgeon: Tj Mayfield MD;   [facility];  Service: Pain Management;  Laterality: N/A;    TUBAL LIGATION  2003    UPPER GASTROINTESTINAL ENDOSCOPY      10/11    uterine ablation  2003     Social History     Occupational History    Not on file   Tobacco Use    Smoking status: Never    Smokeless tobacco: Never   Substance and Sexual Activity    Alcohol use: Yes     Comment: 2-3 times per year.    Drug use: No    Sexual activity: Yes     Partners: Male     Birth control/protection: Surgical      Review of Systems   Constitutional: Negative for chills and fever.   HENT:  Negative for congestion.    Eyes:  Negative for redness.   Respiratory:  Negative for shortness of breath.    Skin:  Negative for rash.   Gastrointestinal:  Negative for abdominal pain, nausea and vomiting.   Neurological:  Negative for dizziness and seizures.   Psychiatric/Behavioral:  Negative for altered mental status.    Current Outpatient Medications on File Prior to Visit   Medication Sig Dispense Refill    albuterol (PROVENTIL/VENTOLIN HFA) 90 mcg/actuation inhaler INHALE 2 PUFFS INTO THE LUNGS EVERY 6 (SIX) HOURS AS NEEDED. RESCUE 20.1 g 11    albuterol-ipratropium (DUO-NEB) 2.5 mg-0.5 mg/3 mL nebulizer solution Take 3 mLs by nebulization every 6 (six) hours as needed for Wheezing. Rescue 90 mL 3    ALPRAZolam (XANAX) 0.5 MG tablet Take Once on call to procedure 1 tablet 0    aspirin 81 mg Tab Take 81 mg by mouth every morning.       budesonide-formoterol 160-4.5 mcg (SYMBICORT) 160-4.5 mcg/actuation HFAA INHALE 2 PUFFS INTO THE LUNGS EVERY 12 (TWELVE) HOURS. 30.6 g 3    calcium citrate-vitamin D3 315-200 mg (CITRACAL+D) 315 mg-5 mcg (200 unit) per tablet Take 1 tablet by mouth 2 (two) times daily.       clindamycin (CLEOCIN) 300 MG capsule Take 2 capsules by mouth now, then take 1 capsule 4 times daily until finished 28 capsule 7    cycloSPORINE (RESTASIS) 0.05 % ophthalmic emulsion Instill one drop into both eyes two times per day 180 each 3    diclofenac  sodium (VOLTAREN) 1 % Gel APPLY 2 GRAMS TOPICALLY 4 (FOUR) TIMES DAILY AS NEEDED.  Strength: 1 % 300 g 2    estrogens, conjugated, (PREMARIN) 0.625 MG tablet take 1 tablet by mouth daily 90 tablet 4    fluconazole (DIFLUCAN) 100 MG tablet Take 1 tablet by mouth once daily for 14 days. 14 tablet 0    halobetasol propion-tazarotene (DUOBRII) 0.01-0.045 % Lotn aaa on feet and hands qhs  then vaseline and warm towel (Patient taking differently: aaa on feet and hands qhs  then vaseline and warm towel) 100 g 3    ibuprofen (ADVIL,MOTRIN) 800 MG tablet Take 1 tablet (800 mg total) by mouth every 6 to 8 hours as needed for pain. 30 tablet 0    INV PROPULSID 10 MG Take 2 tablets (20 mg) by mouth 4 (four) times daily. FOR INVESTIGATIONAL USE ONLY. Protocol [id number]  Subject ID: [id number]. 1440 each 0    ketoconazole (NIZORAL) 2 % cream Apply to feet twice daily for 3 weeks (Patient taking differently: Apply to feet twice daily for 3 weeks) 60 g 3    leflunomide (ARAVA) 20 MG Tab TAKE 1 TABLET BY MOUTH EVERY DAY 90 tablet 0    lifitegrast (XIIDRA) 5 % Dpet INSTILL ONE DROP INTO BOTH EYES TWICE A DAY 60 mL 10    magic mouthwash diphen/antac/lidoc Swish and Spit 5 mL by mouth for 30 seconds twice daily. 150 mL 0    methenamine (HIPREX) 1 gram Tab Take 1 tablet (1 g total) by mouth 2 (two) times daily. 180 tablet 3    methylPREDNISolone (MEDROL DOSEPACK) 4 mg tablet use as directed on package 21 tablet 0    mirabegron (MYRBETRIQ) 25 mg Tb24 ER tablet Take 1 tablet (25 mg total) by mouth once daily. 90 tablet 3    montelukast (SINGULAIR) 10 mg tablet TAKE 1 TABLET BY MOUTH EVERY DAY AT NIGHT 90 tablet 3    naproxen (NAPROSYN) 500 MG tablet Take 1 tablet (500 mg total) by mouth 2 (two) times daily as needed. 180 tablet 0    omeprazole (PRILOSEC) 40 MG capsule Take 1 capsule (40 mg total) by mouth every morning. 30 capsule 6    omeprazole-sodium bicarbonate (ZEGERID) 40-1.1 mg-gram per capsule TAKE 1 CAPSULE BY MOUTH EVERY DAY IN  THE MORNING 90 capsule 3    POTASSIUM ORAL Take by mouth once daily.      predniSONE (DELTASONE) 1 MG tablet Take 5 tablets (5 mg total) by mouth once daily. 450 tablet 1    predniSONE (DELTASONE) 5 MG tablet TAKE 1 TABLET BY MOUTH EVERY DAY 90 tablet 1    pregabalin (LYRICA) 50 MG capsule Take 3 capsules (150 mg total) by mouth every evening. 270 capsule 1    PREMARIN vaginal cream PLACE 0.5 GRAM VAGINALLY 3 (THREE) TIMES A WEEK. 30 g 1    progesterone (PROMETRIUM) 100 MG capsule Take 1 capsule by mouth daily. 90 capsule 1    progesterone (PROMETRIUM) 100 MG capsule take 1 capsule by mouth daily 90 capsule 4    promethazine (PHENERGAN) 25 MG tablet Take 1 tablet (25 mg total) by mouth every 6 (six) hours as needed for Nausea. 30 tablet 1    rosuvastatin (CRESTOR) 20 MG tablet Take 1 tablet (20 mg total) by mouth every evening. 90 tablet 3    sarilumab (KEVZARA) 200 mg/1.14 mL Syrg Inject 1.14 mL (200 mg total) into the skin every 14 (fourteen) days. 2.28 mL 2    sucralfate (CARAFATE) 1 gram tablet TAKE 1 TABLET (1 G TOTAL) BY MOUTH 4 (FOUR) TIMES DAILY. 360 tablet 2    sucralfate (CARAFATE) 1 gram tablet Take 1 tablet (1 g total) by mouth 4 (four) times daily. 360 tablet 3    traMADoL (ULTRAM) 50 mg tablet TAKE 1 TABLET BY MOUTH EVERY 12 HOURS AS NEEDED FOR PAIN. 60 tablet 1    diazePAM (VALIUM) 10 MG Tab Take 1 tablet (10 mg total) by mouth once as needed. 1 tablet 0     No current facility-administered medications on file prior to visit.     Review of patient's allergies indicates:   Allergen Reactions    Actemra [tocilizumab] Other (See Comments)     Severe dizziness    Codeine Nausea And Vomiting and Hives    Gold au 198 Hives and Rash    Hydroxychloroquine Other (See Comments)     Can't remember the reaction      Iodinated contrast media Other (See Comments)     Other reaction(s): BURNING ALL OVER    Iodine Other (See Comments) and Hives     Other reaction(s): BURNING ALL OVER - Iodine dye - Not topical     "Ondansetron Nausea And Vomiting    Sulfa (sulfonamide antibiotics) Other (See Comments)     Can't remember the reaction    Zofran [ondansetron hcl (pf)] Nausea And Vomiting     Pt reports that last time she received zofran she started vomiting again    Methotrexate analogues Nausea Only    Pneumococcal vaccine Other (See Comments)    Pneumovax 23 [pneumococcal 23-argenis ps vaccine] Other (See Comments)     "sick"    Methotrexate Nausea Only         Objective:      Vitals:    12/22/22 0912   BP: 138/78   Pulse: 89   Weight: 63 kg (138 lb 14.4 oz)   Height: 5' 1" (1.549 m)     Ortho Exam     Gen: WD, WN in NAD   HEENT: NC/AT   Heart: RR   Resp: unlabored breathing   Skin: intact, no lesions pertinent to the surgical site   Assessment:       1. Pes planovalgus, acquired, left    2. Degenerative joint disease of hindfoot, left    3. Equinus contracture of left ankle          Plan:       Surgery per Dr. Finnegan      "

## 2022-12-22 NOTE — H&P
Subjective:      Patient ID: Joyce Peterson is a 62 y.o. female.    Chief Complaint: Left left foot pain    Joyce Peterson is a 62 y.o. female who presents today for pre-operative examination.  She is scheduled for left triple arthrodesis, medial column arthrodesis, ARLEN, calcaneal bone graft with Dr. Finnegan.   She was last seen and treated in the clinic on 11/10/22. There has been no significant change in their past medical or orthopedic status since the previous visit.  No fever, chills, malaise or unexplained weight change       Past Medical History:   Diagnosis Date    Acid reflux     Allergy     Anemia     Asthma     Coronary artery disease     CS (cervical spondylosis) 3/8/2013    Degenerative disc disease     Dry eyes     Dry mouth     Gastroparesis     History of methotrexate therapy 1/19/2022    Hyperlipidemia     Lateral meniscus derangement 4/6/2016    Lobular carcinoma in situ     Lumbar spondylosis 3/8/2013    Osteoarthritis     Osteoporosis     Rheumatoid arthritis(714.0)     Rupture of left triceps tendon 10/17/2018    Umbilical hernia 8/13/2015     Past Surgical History:   Procedure Laterality Date    BREAST BIOPSY Left 01/29/2002    core bx    CARPAL TUNNEL RELEASE Right 05/2017    CHOLECYSTECTOMY  2004    COLONOSCOPY      10/11    COLONOSCOPY N/A 6/29/2017    Procedure: COLONOSCOPY;  Surgeon: López Moore MD;  Location: [facility] ([address] [place] [address]);  Service: Endoscopy;  Laterality: N/A;    EPIDURAL STEROID INJECTION N/A 11/18/2021    Procedure: INJECTION, STEROID, EPIDURAL, L5-S1 IL NEED CONSENT;  Surgeon: Tj Mayfield MD;  Location: [facility];  Service: Pain Management;  Laterality: N/A;    EPIDURAL STEROID INJECTION N/A 9/29/2022    Procedure: LUMBAR L3/L4 IL MADELINE CONTRAST;  Surgeon: Tj Mayfield MD;  Location: [facility];  Service: Pain Management;  Laterality: N/A;    EPIDURAL STEROID INJECTION N/A 12/12/2022    Procedure: INJECTION, STEROID, EPIDURAL L5/S1 IL CONTRAST;  Surgeon: Tj  MD Tran;  Location: [facility];  Service: Pain Management;  Laterality: N/A;    INJECTION OF ANESTHETIC AGENT AROUND NERVE Bilateral 8/23/2021    Procedure: BLOCK, NERVE, MEDIAL BRANCH L3,L4,L5;  Surgeon: Tj Mayfield MD;  Location: [facility];  Service: Pain Management;  Laterality: Bilateral;  1 of 2    INJECTION OF ANESTHETIC AGENT AROUND NERVE Bilateral 8/26/2021    Procedure: BLOCK, NERVE, MEDIAL BRANCH L3,L4,L5;  Surgeon: Tj Mayfield MD;  Location: [facility];  Service: Pain Management;  Laterality: Bilateral;  2 of 2    INJECTION OF ANESTHETIC AGENT AROUND NERVE Left 7/7/2022    Procedure: BLOCK, NERVE, LEFT OBTURATOR AND FEMORAL;  Surgeon: Tj Mayfield MD;  Location: [facility];  Service: Pain Management;  Laterality: Left;    INJECTION OF FACET JOINT Bilateral 3/12/2020    Procedure: FACET JOINT INJECTION (LUMBAR BLOCK) BILATERAL L4-5 AND L5-S1 DIRECT REFERRAL;  Surgeon: Tj Mayfield MD;  Location: [facility];  Service: Pain Management;  Laterality: Bilateral;  NEEDS CONSENT    INJECTION OF FACET JOINT Bilateral 3/29/2021    Procedure: INJECTION, FACET JOINT L3/4, L4/5, L5/S1;  Surgeon: Tj Mayfield MD;  Location: [facility];  Service: Pain Management;  Laterality: Bilateral;    INJECTION OF JOINT Right 7/30/2020    Procedure: INJECTION, JOINT, RIGHT HIP Interarticular under flouro;  Surgeon: Tj Mayfield MD;  Location: [facility];  Service: Pain Management;  Laterality: Right;  INJECTION, JOINT, RIGHT HIP Interarticular under flouro    INJECTION OF JOINT Right 2/21/2022    Procedure: Injection, Joint RIGHT ISCHIAL BURSA;  Surgeon: Tj Mayfield MD;  Location: [facility];  Service: Pain Management;  Laterality: Right;    INTRAMEDULLARY RODDING OF FEMUR Left 5/21/2019    Procedure: INSERTION, INTRAMEDULLARY ARIEL, FEMUR;  Surgeon: López Duff MD;  [facility];  Service: Orthopedics;  Laterality: Left;    RADIOFREQUENCY ABLATION Left 9/20/2021    Procedure:  RADIOFREQUENCY ABLATION left L3,4,5 RFA  1 of 2  CONSENT NEEDED;  Surgeon: Tj Mayfield MD;  Location: [facility];  Service: Pain Management;  Laterality: Left;    RADIOFREQUENCY ABLATION Right 10/4/2021    Procedure: RADIOFREQUENCY ABLATION  right L3,4,5 RFA   2 of 2  CONSENT NEEDED;  Surgeon: Tj Mayfield MD;  Location: [facility];  Service: Pain Management;  Laterality: Right;    RADIOFREQUENCY ABLATION Left 4/21/2022    Procedure: Radiofrequency Ablation LEFT L3,L4,L5;  Surgeon: Tj Mayfield MD;  Location: [facility];  Service: Pain Management;  Laterality: Left;    RADIOFREQUENCY ABLATION Right 5/12/2022    Procedure: Radiofrequency Ablation RIGHT L3,L4,L5;  Surgeon: Tj Mayfield MD;  Location: [facility];  Service: Pain Management;  Laterality: Right;    RADIOFREQUENCY ABLATION Left 7/25/2022    Procedure: Radiofrequency Ablation Left Femoral & Obturator;  Surgeon: Tj Mayfield MD;  Location: [facility];  Service: Pain Management;  Laterality: Left;    REPAIR OF TRICEPS TENDON Left 10/17/2018    Procedure: REPAIR, TENDON, TRICEPS left elbow;  Surgeon: Staci Yarbrough MD;  Location: [facility] OR [address] [place] [address];  Service: Orthopedics;  Laterality: Left;  Anesthesia: General and regional. PRONE, k-wire , hand pan 1 and pan 2, CALL ARTHREX/Tamera notified 10-12 LO    TONSILLECTOMY      TRANSFORAMINAL EPIDURAL INJECTION OF STEROID Right 8/31/2020    Procedure: INJECTION, STEROID, EPIDURAL, TRANSFORAMINAL,  APPROACH, L3-L4 and L4-L5 need consent;  Surgeon: Tj Mayfield MD;  Location: [facility];  Service: Pain Management;  Laterality: Right;    TRANSFORAMINAL EPIDURAL INJECTION OF STEROID Bilateral 7/23/2021    Procedure: INJECTION, STEROID, EPIDURAL, TRANSFORAMINAL APPROACH L4/5;  Surgeon: Tj Mayfield MD;  Location: [facility];  Service: Pain Management;  Laterality: Bilateral;    TRIGGER POINT INJECTION N/A 7/23/2021    Procedure: INJECTION, TRIGGER POINT SCAPULAR;  Surgeon: Tj Mayfield MD;   [facility];  Service: Pain Management;  Laterality: N/A;    TUBAL LIGATION  2003    UPPER GASTROINTESTINAL ENDOSCOPY      10/11    uterine ablation  2003     Social History     Occupational History    Not on file   Tobacco Use    Smoking status: Never    Smokeless tobacco: Never   Substance and Sexual Activity    Alcohol use: Yes     Comment: 2-3 times per year.    Drug use: No    Sexual activity: Yes     Partners: Male     Birth control/protection: Surgical      Review of Systems   Constitutional: Negative for chills and fever.   HENT:  Negative for congestion.    Eyes:  Negative for redness.   Respiratory:  Negative for shortness of breath.    Skin:  Negative for rash.   Gastrointestinal:  Negative for abdominal pain, nausea and vomiting.   Neurological:  Negative for dizziness and seizures.   Psychiatric/Behavioral:  Negative for altered mental status.    Current Outpatient Medications on File Prior to Visit   Medication Sig Dispense Refill    albuterol (PROVENTIL/VENTOLIN HFA) 90 mcg/actuation inhaler INHALE 2 PUFFS INTO THE LUNGS EVERY 6 (SIX) HOURS AS NEEDED. RESCUE 20.1 g 11    albuterol-ipratropium (DUO-NEB) 2.5 mg-0.5 mg/3 mL nebulizer solution Take 3 mLs by nebulization every 6 (six) hours as needed for Wheezing. Rescue 90 mL 3    ALPRAZolam (XANAX) 0.5 MG tablet Take Once on call to procedure 1 tablet 0    aspirin 81 mg Tab Take 81 mg by mouth every morning.       budesonide-formoterol 160-4.5 mcg (SYMBICORT) 160-4.5 mcg/actuation HFAA INHALE 2 PUFFS INTO THE LUNGS EVERY 12 (TWELVE) HOURS. 30.6 g 3    calcium citrate-vitamin D3 315-200 mg (CITRACAL+D) 315 mg-5 mcg (200 unit) per tablet Take 1 tablet by mouth 2 (two) times daily.       clindamycin (CLEOCIN) 300 MG capsule Take 2 capsules by mouth now, then take 1 capsule 4 times daily until finished 28 capsule 7    cycloSPORINE (RESTASIS) 0.05 % ophthalmic emulsion Instill one drop into both eyes two times per day 180 each 3    diclofenac  sodium (VOLTAREN) 1 % Gel APPLY 2 GRAMS TOPICALLY 4 (FOUR) TIMES DAILY AS NEEDED.  Strength: 1 % 300 g 2    estrogens, conjugated, (PREMARIN) 0.625 MG tablet take 1 tablet by mouth daily 90 tablet 4    fluconazole (DIFLUCAN) 100 MG tablet Take 1 tablet by mouth once daily for 14 days. 14 tablet 0    halobetasol propion-tazarotene (DUOBRII) 0.01-0.045 % Lotn aaa on feet and hands qhs  then vaseline and warm towel (Patient taking differently: aaa on feet and hands qhs  then vaseline and warm towel) 100 g 3    ibuprofen (ADVIL,MOTRIN) 800 MG tablet Take 1 tablet (800 mg total) by mouth every 6 to 8 hours as needed for pain. 30 tablet 0    INV PROPULSID 10 MG Take 2 tablets (20 mg) by mouth 4 (four) times daily. FOR INVESTIGATIONAL USE ONLY. Protocol [id number]  Subject ID: [id number]. 1440 each 0    ketoconazole (NIZORAL) 2 % cream Apply to feet twice daily for 3 weeks (Patient taking differently: Apply to feet twice daily for 3 weeks) 60 g 3    leflunomide (ARAVA) 20 MG Tab TAKE 1 TABLET BY MOUTH EVERY DAY 90 tablet 0    lifitegrast (XIIDRA) 5 % Dpet INSTILL ONE DROP INTO BOTH EYES TWICE A DAY 60 mL 10    magic mouthwash diphen/antac/lidoc Swish and Spit 5 mL by mouth for 30 seconds twice daily. 150 mL 0    methenamine (HIPREX) 1 gram Tab Take 1 tablet (1 g total) by mouth 2 (two) times daily. 180 tablet 3    methylPREDNISolone (MEDROL DOSEPACK) 4 mg tablet use as directed on package 21 tablet 0    mirabegron (MYRBETRIQ) 25 mg Tb24 ER tablet Take 1 tablet (25 mg total) by mouth once daily. 90 tablet 3    montelukast (SINGULAIR) 10 mg tablet TAKE 1 TABLET BY MOUTH EVERY DAY AT NIGHT 90 tablet 3    naproxen (NAPROSYN) 500 MG tablet Take 1 tablet (500 mg total) by mouth 2 (two) times daily as needed. 180 tablet 0    omeprazole (PRILOSEC) 40 MG capsule Take 1 capsule (40 mg total) by mouth every morning. 30 capsule 6    omeprazole-sodium bicarbonate (ZEGERID) 40-1.1 mg-gram per capsule TAKE 1 CAPSULE BY MOUTH EVERY DAY IN  THE MORNING 90 capsule 3    POTASSIUM ORAL Take by mouth once daily.      predniSONE (DELTASONE) 1 MG tablet Take 5 tablets (5 mg total) by mouth once daily. 450 tablet 1    predniSONE (DELTASONE) 5 MG tablet TAKE 1 TABLET BY MOUTH EVERY DAY 90 tablet 1    pregabalin (LYRICA) 50 MG capsule Take 3 capsules (150 mg total) by mouth every evening. 270 capsule 1    PREMARIN vaginal cream PLACE 0.5 GRAM VAGINALLY 3 (THREE) TIMES A WEEK. 30 g 1    progesterone (PROMETRIUM) 100 MG capsule Take 1 capsule by mouth daily. 90 capsule 1    progesterone (PROMETRIUM) 100 MG capsule take 1 capsule by mouth daily 90 capsule 4    promethazine (PHENERGAN) 25 MG tablet Take 1 tablet (25 mg total) by mouth every 6 (six) hours as needed for Nausea. 30 tablet 1    rosuvastatin (CRESTOR) 20 MG tablet Take 1 tablet (20 mg total) by mouth every evening. 90 tablet 3    sarilumab (KEVZARA) 200 mg/1.14 mL Syrg Inject 1.14 mL (200 mg total) into the skin every 14 (fourteen) days. 2.28 mL 2    sucralfate (CARAFATE) 1 gram tablet TAKE 1 TABLET (1 G TOTAL) BY MOUTH 4 (FOUR) TIMES DAILY. 360 tablet 2    sucralfate (CARAFATE) 1 gram tablet Take 1 tablet (1 g total) by mouth 4 (four) times daily. 360 tablet 3    traMADoL (ULTRAM) 50 mg tablet TAKE 1 TABLET BY MOUTH EVERY 12 HOURS AS NEEDED FOR PAIN. 60 tablet 1    diazePAM (VALIUM) 10 MG Tab Take 1 tablet (10 mg total) by mouth once as needed. 1 tablet 0     No current facility-administered medications on file prior to visit.     Review of patient's allergies indicates:   Allergen Reactions    Actemra [tocilizumab] Other (See Comments)     Severe dizziness    Codeine Nausea And Vomiting and Hives    Gold au 198 Hives and Rash    Hydroxychloroquine Other (See Comments)     Can't remember the reaction      Iodinated contrast media Other (See Comments)     Other reaction(s): BURNING ALL OVER    Iodine Other (See Comments) and Hives     Other reaction(s): BURNING ALL OVER - Iodine dye - Not topical     "Ondansetron Nausea And Vomiting    Sulfa (sulfonamide antibiotics) Other (See Comments)     Can't remember the reaction    Zofran [ondansetron hcl (pf)] Nausea And Vomiting     Pt reports that last time she received zofran she started vomiting again    Methotrexate analogues Nausea Only    Pneumococcal vaccine Other (See Comments)    Pneumovax 23 [pneumococcal 23-argenis ps vaccine] Other (See Comments)     "sick"    Methotrexate Nausea Only         Objective:      Vitals:    12/22/22 0912   BP: 138/78   Pulse: 89   Weight: 63 kg (138 lb 14.4 oz)   Height: 5' 1" (1.549 m)     Ortho Exam     Gen: WD, WN in NAD   HEENT: NC/AT   Heart: RR   Resp: unlabored breathing   Skin: intact, no lesions pertinent to the surgical site   Assessment:       1. Pes planovalgus, acquired, left    2. Degenerative joint disease of hindfoot, left    3. Equinus contracture of left ankle          Plan:       Surgery per Dr. Finnegan      "

## 2022-12-27 ENCOUNTER — OFFICE VISIT (OUTPATIENT)
Dept: PAIN MEDICINE | Facility: CLINIC | Age: 62
End: 2022-12-27
Payer: MEDICARE

## 2022-12-27 DIAGNOSIS — M79.18 MYOFASCIAL PAIN: ICD-10-CM

## 2022-12-27 DIAGNOSIS — G89.4 CHRONIC PAIN SYNDROME: ICD-10-CM

## 2022-12-27 DIAGNOSIS — M54.16 LUMBAR RADICULOPATHY: Primary | ICD-10-CM

## 2022-12-27 DIAGNOSIS — M47.816 LUMBAR SPONDYLOSIS: ICD-10-CM

## 2022-12-27 DIAGNOSIS — M51.36 DDD (DEGENERATIVE DISC DISEASE), LUMBAR: ICD-10-CM

## 2022-12-27 PROCEDURE — 1159F MED LIST DOCD IN RCRD: CPT | Mod: CPTII,95,, | Performed by: NURSE PRACTITIONER

## 2022-12-27 PROCEDURE — 99213 OFFICE O/P EST LOW 20 MIN: CPT | Mod: 95,,, | Performed by: NURSE PRACTITIONER

## 2022-12-27 PROCEDURE — 3044F PR MOST RECENT HEMOGLOBIN A1C LEVEL <7.0%: ICD-10-PCS | Mod: CPTII,95,, | Performed by: NURSE PRACTITIONER

## 2022-12-27 PROCEDURE — 99213 PR OFFICE/OUTPT VISIT, EST, LEVL III, 20-29 MIN: ICD-10-PCS | Mod: 95,,, | Performed by: NURSE PRACTITIONER

## 2022-12-27 PROCEDURE — 3044F HG A1C LEVEL LT 7.0%: CPT | Mod: CPTII,95,, | Performed by: NURSE PRACTITIONER

## 2022-12-27 PROCEDURE — 1160F PR REVIEW ALL MEDS BY PRESCRIBER/CLIN PHARMACIST DOCUMENTED: ICD-10-PCS | Mod: CPTII,95,, | Performed by: NURSE PRACTITIONER

## 2022-12-27 PROCEDURE — 1159F PR MEDICATION LIST DOCUMENTED IN MEDICAL RECORD: ICD-10-PCS | Mod: CPTII,95,, | Performed by: NURSE PRACTITIONER

## 2022-12-27 PROCEDURE — 1160F RVW MEDS BY RX/DR IN RCRD: CPT | Mod: CPTII,95,, | Performed by: NURSE PRACTITIONER

## 2022-12-27 NOTE — PROGRESS NOTES
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 HFAA INHALE 2 PUFFS INTO THE LUNGS EVERY 12 (TWELVE) HOURS. 30.6 g 3    calcium citrate-vitamin D3 315-200 mg (CITRACAL+D) 315 mg-5 mcg (200 unit) per tablet Take 1 tablet by mouth 2 (two) times daily.       cefadroxil (DURICEF) 500 MG Cap Take 1 capsule (500 mg total) by mouth every 12 (twelve) hours. for 10 days 20 capsule 0    clindamycin (CLEOCIN) 300 MG capsule Take 2 capsules by mouth now, then take 1 capsule 4 times daily until finished 28 capsule 7    cycloSPORINE (RESTASIS) 0.05 % ophthalmic emulsion Instill one drop into both eyes two times per day 180 each 3    diazePAM (VALIUM) 10 MG Tab Take 1 tablet (10 mg total) by mouth once as needed. 1 tablet 0    diclofenac sodium (VOLTAREN) 1 % Gel APPLY 2 GRAMS TOPICALLY 4 (FOUR) TIMES DAILY AS NEEDED.  Strength: 1 % 300 g 2    estrogens, conjugated, (PREMARIN) 0.625 MG tablet take 1 tablet by mouth daily 90 tablet 4    fluconazole (DIFLUCAN) 100 MG tablet Take 1 tablet by mouth once daily for 14 days. 14 tablet 0    halobetasol propion-tazarotene (DUOBRII) 0.01-0.045 % Lotn aaa on feet and hands qhs  then vaseline and warm towel (Patient taking differently: aaa on feet and hands qhs  then vaseline and warm towel) 100 g 3    ibuprofen (ADVIL,MOTRIN) 800 MG tablet Take 1 tablet (800 mg total) by mouth every 6 to 8 hours as needed for pain. 30 tablet 0    INV PROPULSID  10 MG Take 2 tablets (20 mg) by mouth 4 (four) times daily. FOR INVESTIGATIONAL USE ONLY. Protocol [id number]  Subject ID: [id number]. 1440 each 0    ketoconazole (NIZORAL) 2 % cream Apply to feet twice daily for 3 weeks (Patient taking differently: Apply to feet twice daily for 3 weeks) 60 g 3    leflunomide (ARAVA) 20 MG Tab TAKE 1 TABLET BY MOUTH EVERY DAY 90 tablet 0    lifitegrast (XIIDRA) 5 % Dpet INSTILL ONE DROP INTO BOTH EYES TWICE A DAY 60 mL 10    magic mouthwash diphen/antac/lidoc Swish and Spit 5 mL by mouth for 30 seconds twice daily. 150 mL 0    methenamine (HIPREX) 1 gram Tab Take 1 tablet (1 g total) by mouth 2 (two) times daily. 180 tablet 3    methocarbamoL (ROBAXIN) 750 MG Tab Take 1 tablet (750 mg total) by mouth 3 (three) times daily. for 14 days 42 tablet 0    methylPREDNISolone (MEDROL DOSEPACK) 4 mg tablet use as directed on package 21 tablet 0    mirabegron (MYRBETRIQ) 25 mg Tb24 ER tablet Take 1 tablet (25 mg total) by mouth once daily. 90 tablet 3    montelukast (SINGULAIR) 10 mg tablet TAKE 1 TABLET BY MOUTH EVERY DAY AT NIGHT 90 tablet 3    naproxen (NAPROSYN) 500 MG tablet Take 1 tablet (500 mg total) by mouth 2 (two) times daily as needed. 180 tablet 0    omeprazole (PRILOSEC) 40 MG capsule Take 1 capsule (40 mg total) by mouth every morning. 30 capsule 6    omeprazole-sodium bicarbonate (ZEGERID) 40-1.1 mg-gram per capsule TAKE 1 CAPSULE BY MOUTH EVERY DAY IN THE MORNING 90 capsule 3    oxyCODONE (ROXICODONE) 5 MG immediate release tablet Take 1 tablet (5 mg total) by mouth every 4 (four) hours as needed for Pain. 30 tablet 0    POTASSIUM ORAL Take by mouth once daily.      predniSONE (DELTASONE) 1 MG tablet Take 5 tablets (5 mg total) by mouth once daily. 450 tablet 1    predniSONE (DELTASONE) 5 MG tablet TAKE 1 TABLET BY MOUTH EVERY DAY 90 tablet 1    pregabalin (LYRICA) 50 MG capsule Take 3 capsules (150 mg total) by mouth every evening. 270 capsule 1    PREMARIN vaginal cream PLACE  0.5 GRAM VAGINALLY 3 (THREE) TIMES A WEEK. 30 g 1    progesterone (PROMETRIUM) 100 MG capsule Take 1 capsule by mouth daily. 90 capsule 1    progesterone (PROMETRIUM) 100 MG capsule take 1 capsule by mouth daily 90 capsule 4    promethazine (PHENERGAN) 25 MG tablet Take 1 tablet (25 mg total) by mouth every 6 (six) hours as needed for Nausea. 30 tablet 1    promethazine (PHENERGAN) 25 MG tablet Take 1 tablet (25 mg total) by mouth every 6 (six) hours as needed for Nausea. 15 tablet 0    rosuvastatin (CRESTOR) 20 MG tablet Take 1 tablet (20 mg total) by mouth every evening. 90 tablet 3    sarilumab (KEVZARA) 200 mg/1.14 mL Syrg Inject 1.14 mL (200 mg total) into the skin every 14 (fourteen) days. 2.28 mL 2    sucralfate (CARAFATE) 1 gram tablet TAKE 1 TABLET (1 G TOTAL) BY MOUTH 4 (FOUR) TIMES DAILY. 360 tablet 2    sucralfate (CARAFATE) 1 gram tablet Take 1 tablet (1 g total) by mouth 4 (four) times daily. 360 tablet 3    traMADoL (ULTRAM) 50 mg tablet TAKE 1 TABLET BY MOUTH EVERY 12 HOURS AS NEEDED FOR PAIN. 60 tablet 1     No current facility-administered medications for this visit.       REVIEW OF SYSTEMS:    GENERAL:  No weight loss, malaise or fevers.  HEENT:  Negative for frequent or significant headaches.  NECK:  Negative for lumps, goiter, pain and significant neck swelling.  RESPIRATORY:  Negative for cough, wheezing or shortness of breath. Asthma.  CARDIOVASCULAR:  Negative for chest pain, leg swelling or palpitations. CAD.  GI:  Negative for abdominal discomfort, blood in stools or black stools or change in bowel habits.  MUSCULOSKELETAL:  See HPI.  SKIN:  Negative for lesions, rash, and itching.  PSYCH:  Negative for sleep disturbance, mood disorder and recent psychosocial stressors.  HEMATOLOGY/LYMPHOLOGY:  Negative for prolonged bleeding, bruising easily or swollen nodes.  NEURO:   No history of headaches, syncope, paralysis, seizures or tremors.  All other reviewed and negative other than HPI.    Past  Medical History:  Past Medical History:   Diagnosis Date    Acid reflux     Allergy     Anemia     Asthma     Coronary artery disease     CS (cervical spondylosis) 3/8/2013    Degenerative disc disease     Dry eyes     Dry mouth     Gastroparesis     History of methotrexate therapy 1/19/2022    Hyperlipidemia     Lateral meniscus derangement 4/6/2016    Lobular carcinoma in situ     Lumbar spondylosis 3/8/2013    Osteoarthritis     Osteoporosis     Rheumatoid arthritis(714.0)     Rupture of left triceps tendon 10/17/2018    Umbilical hernia 8/13/2015       Past Surgical History:  Past Surgical History:   Procedure Laterality Date    BREAST BIOPSY Left 01/29/2002    core bx    CARPAL TUNNEL RELEASE Right 05/2017    CHOLECYSTECTOMY  2004    COLONOSCOPY      10/11    COLONOSCOPY N/A 6/29/2017    Procedure: COLONOSCOPY;  Surgeon: López Moore MD;  Location: [facility] ENDO ([address] [place] [address]);  Service: Endoscopy;  Laterality: N/A;    EPIDURAL STEROID INJECTION N/A 11/18/2021    Procedure: INJECTION, STEROID, EPIDURAL, L5-S1 IL NEED CONSENT;  Surgeon: Tj Mayfield MD;  Location: [facility] [place] PAIN MGT;  Service: Pain Management;  Laterality: N/A;    EPIDURAL STEROID INJECTION N/A 9/29/2022    Procedure: LUMBAR L3/L4 IL MADELINE CONTRAST;  Surgeon: Tj Mayfield MD;  Location: [facility] [place] PAIN MGT;  Service: Pain Management;  Laterality: N/A;    EPIDURAL STEROID INJECTION N/A 12/12/2022    Procedure: INJECTION, STEROID, EPIDURAL L5/S1 IL CONTRAST;  Surgeon: Tj Mayfield MD;  Location: [facility] [place] PAIN MGT;  Service: Pain Management;  Laterality: N/A;    INJECTION OF ANESTHETIC AGENT AROUND NERVE Bilateral 8/23/2021    Procedure: BLOCK, NERVE, MEDIAL BRANCH L3,L4,L5;  Surgeon: Tj Mayfield MD;  Location: [facility] [place] PAIN MGT;  Service: Pain Management;  Laterality: Bilateral;  1 of 2    INJECTION OF ANESTHETIC AGENT AROUND NERVE Bilateral 8/26/2021    Procedure: BLOCK, NERVE, MEDIAL BRANCH L3,L4,L5;  Surgeon: Tj Mayfield MD;  Location: [facility] PAIN MGT;  Service: Pain  Management;  Laterality: Bilateral;  2 of 2    INJECTION OF ANESTHETIC AGENT AROUND NERVE Left 7/7/2022    Procedure: BLOCK, NERVE, LEFT OBTURATOR AND FEMORAL;  Surgeon: Tj Mayfield MD;  Location: [facility] PAIN MGT;  Service: Pain Management;  Laterality: Left;    INJECTION OF FACET JOINT Bilateral 3/12/2020    Procedure: FACET JOINT INJECTION (LUMBAR BLOCK) BILATERAL L4-5 AND L5-S1 DIRECT REFERRAL;  Surgeon: Tj Mayfield MD;  Location: [facility] PAIN MGT;  Service: Pain Management;  Laterality: Bilateral;  NEEDS CONSENT    INJECTION OF FACET JOINT Bilateral 3/29/2021    Procedure: INJECTION, FACET JOINT L3/4, L4/5, L5/S1;  Surgeon: Tj Mayfield MD;  Location: [facility] PAIN MGT;  Service: Pain Management;  Laterality: Bilateral;    INJECTION OF JOINT Right 7/30/2020    Procedure: INJECTION, JOINT, RIGHT HIP Interarticular under flouro;  Surgeon: Tj Mayfield MD;  Location: [facility] PAIN MGT;  Service: Pain Management;  Laterality: Right;  INJECTION, JOINT, RIGHT HIP Interarticular under flouro    INJECTION OF JOINT Right 2/21/2022    Procedure: Injection, Joint RIGHT ISCHIAL BURSA;  Surgeon: Tj Mayfield MD;  Location: [facility] PAIN MGT;  Service: Pain Management;  Laterality: Right;    INTRAMEDULLARY RODDING OF FEMUR Left 5/21/2019    Procedure: INSERTION, INTRAMEDULLARY ARIEL, FEMUR;  Surgeon: López Duff MD;  Location: [facility] OR 2ND FLR;  Service: Orthopedics;  Laterality: Left;    RADIOFREQUENCY ABLATION Left 9/20/2021    Procedure: RADIOFREQUENCY ABLATION left L3,4,5 RFA  1 of 2  CONSENT NEEDED;  Surgeon: Tj Mayfield MD;  Location: [facility] PAIN MGT;  Service: Pain Management;  Laterality: Left;    RADIOFREQUENCY ABLATION Right 10/4/2021    Procedure: RADIOFREQUENCY ABLATION  right L3,4,5 RFA   2 of 2  CONSENT NEEDED;  Surgeon: Tj Mayfield MD;  Location: [facility] PAIN MGT;  Service: Pain Management;  Laterality: Right;    RADIOFREQUENCY ABLATION Left 4/21/2022    Procedure: Radiofrequency Ablation LEFT L3,L4,L5;  Surgeon: Tj Mayfield  MD;  Location: [facility] PAIN MGT;  Service: Pain Management;  Laterality: Left;    RADIOFREQUENCY ABLATION Right 5/12/2022    Procedure: Radiofrequency Ablation RIGHT L3,L4,L5;  Surgeon: Tj Mayfield MD;  Location: [facility] PAIN MGT;  Service: Pain Management;  Laterality: Right;    RADIOFREQUENCY ABLATION Left 7/25/2022    Procedure: Radiofrequency Ablation Left Femoral & Obturator;  Surgeon: Tj Mayfield MD;  Location: [facility] PAIN MGT;  Service: Pain Management;  Laterality: Left;    REPAIR OF TRICEPS TENDON Left 10/17/2018    Procedure: REPAIR, TENDON, TRICEPS left elbow;  Surgeon: Staci Yarbrough MD;  Location: [facility] OR 1ST FLR;  Service: Orthopedics;  Laterality: Left;  Anesthesia: General and regional. PRONE, k-wire , hand pan 1 and pan 2, CALL ARTHREX/Tamera notified 10-12 LO    TONSILLECTOMY      TRANSFORAMINAL EPIDURAL INJECTION OF STEROID Right 8/31/2020    Procedure: INJECTION, STEROID, EPIDURAL, TRANSFORAMINAL,  APPROACH, L3-L4 and L4-L5 need consent;  Surgeon: Tj Mayfield MD;  Location: [facility] PAIN [facility]GT;  Service: Pain Management;  Laterality: Right;    TRANSFORAMINAL EPIDURAL INJECTION OF STEROID Bilateral 7/23/2021    Procedure: INJECTION, STEROID, EPIDURAL, TRANSFORAMINAL APPROACH L4/5;  Surgeon: Tj Mayfield MD;  Location: [facility] PAIN MGT;  Service: Pain Management;  Laterality: Bilateral;    TRIGGER POINT INJECTION N/A 7/23/2021    Procedure: INJECTION, TRIGGER POINT SCAPULAR;  Surgeon: Tj Mayfield MD;  Location: [facility] PAIN [facility]GT;  Service: Pain Management;  Laterality: N/A;    TUBAL LIGATION  2003    UPPER GASTROINTESTINAL ENDOSCOPY      10/11    uterine ablation  2003       Family History:  Family History   Problem Relation Age of Onset    Cancer Mother         Lung Cancer    Emphysema Mother     Heart attack Mother     Alcohol abuse Mother     Cancer Father         Lung Cancer    Osteoarthritis Father     Lung cancer Father     Skin cancer Father     Alcohol abuse Father     Heart disease Brother      Heart attack Brother     Alcohol abuse Brother     Cirrhosis Brother     Osteoarthritis Paternal Aunt     Retinal detachment Maternal Aunt     No Known Problems Sister     No Known Problems Maternal Uncle     No Known Problems Paternal Uncle     No Known Problems Maternal Grandmother     No Known Problems Maternal Grandfather     No Known Problems Paternal Grandmother     No Known Problems Paternal Grandfather     Colon cancer Neg Hx     Esophageal cancer Neg Hx     Stomach cancer Neg Hx     Celiac disease Neg Hx     Diabetes Neg Hx     Thyroid disease Neg Hx     Amblyopia Neg Hx     Blindness Neg Hx     Cataracts Neg Hx     Glaucoma Neg Hx     Hypertension Neg Hx     Macular degeneration Neg Hx     Strabismus Neg Hx     Stroke Neg Hx        Social History:  Social History     Socioeconomic History    Marital status:    Tobacco Use    Smoking status: Never    Smokeless tobacco: Never   Substance and Sexual Activity    Alcohol use: Yes     Comment: 2-3 times per year.    Drug use: No    Sexual activity: Yes     Partners: Male     Birth control/protection: Surgical     Social Determinants of Health     Financial Resource Strain: Low Risk     Difficulty of Paying Living Expenses: Not hard at all   Food Insecurity: Unknown    Worried About Running Out of Food in the Last Year: Patient refused    Ran Out of Food in the Last Year: Patient refused   Transportation Needs: Unknown    Lack of Transportation (Medical): Patient refused    Lack of Transportation (Non-Medical): Patient refused   Physical Activity: Unknown    Days of Exercise per Week: 2 days   Stress: No Stress Concern Present    Feeling of Stress : Not at all   Social Connections: Unknown    Frequency of Communication with Friends and Family: Twice a week    Frequency of Social Gatherings with Friends and Family: Twice a week    Active Member of Clubs or Organizations: Yes    Attends Club or Organization Meetings: Patient refused    Marital Status:     Housing Stability: Low Risk     Unable to Pay for Housing in the Last Year: No    Number of Places Lived in the Last Year: 1    Unstable Housing in the Last Year: No       OBJECTIVE:      PHYSICAL EXAMINATION:    General appearance: Well appearing, in no acute distress, alert and oriented x3.  Psych:  Mood and affect appropriate.  Skin: Skin color normal, no rashes or lesions, in both upper and lower body.  Extremities: Moves all visualized extremities freely.      PREVIOUS PHYSICAL EXAMINATION:    General appearance: Well appearing, in no acute distress, alert.  Psych:  Mood and affect appropriate.  Back:  No TTP over lumbar facet joints bilaterally. Limited ROM with pain on lumbar extension and flexion.  Facet loading bilaterally L>R. Negative SLR bilaterally.   MSK: TTP to right ischial bursa but does not reproduce radicular symptoms. No pain on movement of right knee. No pain on extension of right knee. TTP at left lateral and anterior hip.  Pain with manipulation of left hip.  Neuro: No loss of sensation.  Kelsey is negative bilaterally.  Gait: Antalgic- ambulates with cane.    ASSESSMENT: 62 y.o. year old female with  Left sided back and hip pain, consistent with the following diagnoses:     1. Lumbar radiculopathy        2. Chronic pain syndrome        3. Myofascial pain        4. Lumbar spondylosis        5. DDD (degenerative disc disease), lumbar              PLAN:      - Pt is s/p L5/S1 IL MADELINE with benefit of back pain. Pain is tolerable at this time.    - She is having left foot surgery next month and likely right shoulder surgery after that.    - Previous Lumbar MRI reviewed.     - Continue with PT exercises.    - The patient will continue a home exercise routine to help with pain and strengthening.      - RTC as needed.      The above plan and management options were discussed at length with patient. Patient is in agreement with the above and verbalized understanding.    Valarie CABRERA  Ani  12/27/2022

## 2022-12-29 ENCOUNTER — ANESTHESIA EVENT (OUTPATIENT)
Dept: SURGERY | Facility: HOSPITAL | Age: 62
End: 2022-12-29
Payer: MEDICARE

## 2022-12-29 ENCOUNTER — HOSPITAL ENCOUNTER (OUTPATIENT)
Dept: PREADMISSION TESTING | Facility: HOSPITAL | Age: 62
Discharge: HOME | End: 2022-12-29
Attending: ORTHOPAEDIC SURGERY | Admitting: ORTHOPAEDIC SURGERY
Payer: MEDICARE

## 2022-12-29 ENCOUNTER — HOSPITAL ENCOUNTER (OUTPATIENT)
Dept: RADIOLOGY | Facility: HOSPITAL | Age: 62
Discharge: HOME | End: 2022-12-29
Attending: HOSPITALIST | Admitting: ORTHOPAEDIC SURGERY
Payer: MEDICARE

## 2022-12-29 ENCOUNTER — OFFICE VISIT (OUTPATIENT)
Dept: INTERNAL MEDICINE | Facility: CLINIC | Age: 62
End: 2022-12-29
Payer: MEDICARE

## 2022-12-29 VITALS
TEMPERATURE: 99 F | DIASTOLIC BLOOD PRESSURE: 76 MMHG | HEIGHT: 61 IN | HEART RATE: 87 BPM | BODY MASS INDEX: 26.93 KG/M2 | WEIGHT: 142.63 LBS | SYSTOLIC BLOOD PRESSURE: 128 MMHG | OXYGEN SATURATION: 96 %

## 2022-12-29 DIAGNOSIS — Z79.52 CURRENT CHRONIC USE OF SYSTEMIC STEROIDS: ICD-10-CM

## 2022-12-29 DIAGNOSIS — D72.819 LEUKOPENIA, UNSPECIFIED TYPE: ICD-10-CM

## 2022-12-29 DIAGNOSIS — M05.79 RHEUMATOID ARTHRITIS INVOLVING MULTIPLE SITES WITH POSITIVE RHEUMATOID FACTOR: Chronic | ICD-10-CM

## 2022-12-29 DIAGNOSIS — H04.123 DRY EYES: ICD-10-CM

## 2022-12-29 DIAGNOSIS — Z86.16 HISTORY OF COVID-19: ICD-10-CM

## 2022-12-29 DIAGNOSIS — E78.5 HYPERLIPIDEMIA, UNSPECIFIED HYPERLIPIDEMIA TYPE: ICD-10-CM

## 2022-12-29 DIAGNOSIS — M79.7 FIBROMYALGIA: ICD-10-CM

## 2022-12-29 DIAGNOSIS — Z79.890 HORMONE REPLACEMENT THERAPY (HRT): ICD-10-CM

## 2022-12-29 DIAGNOSIS — M05.79 RHEUMATOID ARTHRITIS INVOLVING MULTIPLE SITES WITH POSITIVE RHEUMATOID FACTOR: ICD-10-CM

## 2022-12-29 DIAGNOSIS — T38.0X5A STEROID-INDUCED OSTEOPOROSIS: Chronic | ICD-10-CM

## 2022-12-29 DIAGNOSIS — J45.20 MILD INTERMITTENT ASTHMA WITHOUT COMPLICATION: ICD-10-CM

## 2022-12-29 DIAGNOSIS — M81.8 STEROID-INDUCED OSTEOPOROSIS: Chronic | ICD-10-CM

## 2022-12-29 DIAGNOSIS — I25.10 CORONARY ARTERY DISEASE INVOLVING NATIVE CORONARY ARTERY OF NATIVE HEART WITHOUT ANGINA PECTORIS: ICD-10-CM

## 2022-12-29 DIAGNOSIS — G89.29 OTHER CHRONIC PAIN: ICD-10-CM

## 2022-12-29 DIAGNOSIS — M54.16 LUMBAR RADICULOPATHY: ICD-10-CM

## 2022-12-29 DIAGNOSIS — N39.41 URGE URINARY INCONTINENCE: ICD-10-CM

## 2022-12-29 DIAGNOSIS — R60.9 EDEMA, UNSPECIFIED TYPE: ICD-10-CM

## 2022-12-29 DIAGNOSIS — K76.0 FATTY LIVER: ICD-10-CM

## 2022-12-29 DIAGNOSIS — E04.1 THYROID NODULE: Chronic | ICD-10-CM

## 2022-12-29 DIAGNOSIS — K21.9 GASTROESOPHAGEAL REFLUX DISEASE WITHOUT ESOPHAGITIS: ICD-10-CM

## 2022-12-29 DIAGNOSIS — K31.84 GASTROPARESIS: ICD-10-CM

## 2022-12-29 PROCEDURE — 72050 XR CERVICAL SPINE AP LAT WITH FLEX EXTEN: ICD-10-PCS | Mod: 26,,, | Performed by: RADIOLOGY

## 2022-12-29 PROCEDURE — 72050 X-RAY EXAM NECK SPINE 4/5VWS: CPT | Mod: 26,,, | Performed by: RADIOLOGY

## 2022-12-29 PROCEDURE — 3078F PR MOST RECENT DIASTOLIC BLOOD PRESSURE < 80 MM HG: ICD-10-PCS | Mod: CPTII,S$GLB,, | Performed by: HOSPITALIST

## 2022-12-29 PROCEDURE — 3008F PR BODY MASS INDEX (BMI) DOCUMENTED: ICD-10-PCS | Mod: CPTII,S$GLB,, | Performed by: HOSPITALIST

## 2022-12-29 PROCEDURE — 72050 X-RAY EXAM NECK SPINE 4/5VWS: CPT | Mod: TC

## 2022-12-29 PROCEDURE — 3074F SYST BP LT 130 MM HG: CPT | Mod: CPTII,S$GLB,, | Performed by: HOSPITALIST

## 2022-12-29 PROCEDURE — 99214 PR OFFICE/OUTPT VISIT, EST, LEVL IV, 30-39 MIN: ICD-10-PCS | Mod: S$GLB,,, | Performed by: HOSPITALIST

## 2022-12-29 PROCEDURE — 99999 PR PBB SHADOW E&M-EST. PATIENT-LVL III: CPT | Mod: PBBFAC,,, | Performed by: HOSPITALIST

## 2022-12-29 PROCEDURE — 99214 OFFICE O/P EST MOD 30 MIN: CPT | Mod: S$GLB,,, | Performed by: HOSPITALIST

## 2022-12-29 PROCEDURE — 3078F DIAST BP <80 MM HG: CPT | Mod: CPTII,S$GLB,, | Performed by: HOSPITALIST

## 2022-12-29 PROCEDURE — 3044F HG A1C LEVEL LT 7.0%: CPT | Mod: CPTII,S$GLB,, | Performed by: HOSPITALIST

## 2022-12-29 PROCEDURE — 3008F BODY MASS INDEX DOCD: CPT | Mod: CPTII,S$GLB,, | Performed by: HOSPITALIST

## 2022-12-29 PROCEDURE — 3044F PR MOST RECENT HEMOGLOBIN A1C LEVEL <7.0%: ICD-10-PCS | Mod: CPTII,S$GLB,, | Performed by: HOSPITALIST

## 2022-12-29 PROCEDURE — 3074F PR MOST RECENT SYSTOLIC BLOOD PRESSURE < 130 MM HG: ICD-10-PCS | Mod: CPTII,S$GLB,, | Performed by: HOSPITALIST

## 2022-12-29 PROCEDURE — 99999 PR PBB SHADOW E&M-EST. PATIENT-LVL III: ICD-10-PCS | Mod: PBBFAC,,, | Performed by: HOSPITALIST

## 2022-12-29 NOTE — ASSESSMENT & PLAN NOTE
She is doing good with acid reflux following diet and medication usage    GERD Symptoms -acid/Sour  taste to the throat    Does not sound Cardiac   Tips to control reflux discussed     GERD-  I suggest continuation of the Proton pump inhibitor in the perioperative period . I suggest aspiration precautions

## 2022-12-29 NOTE — ASSESSMENT & PLAN NOTE
She is doing good with the fibromyalgia and finds Lyrica helpful   Quality 110: Preventive Care And Screening: Influenza Immunization: Influenza Immunization Administered during Influenza season Quality 111:Pneumonia Vaccination Status For Older Adults: Pneumococcal Vaccination not Administered or Previously Received, Reason not Otherwise Specified Quality 431: Preventive Care And Screening: Unhealthy Alcohol Use - Screening: Patient screened for unhealthy alcohol use using a single question and scores less than 2 times per year Quality 226: Preventive Care And Screening: Tobacco Use: Screening And Cessation Intervention: Patient screened for tobacco and never smoked Detail Level: Detailed Quality 130: Documentation Of Current Medications In The Medical Record: Current Medications Documented

## 2022-12-29 NOTE — ASSESSMENT & PLAN NOTE
She is doing very well with asthma and has not required as needed medication in 2 years time   She uses Symbicort on a scheduled basis and she knows about rinsing mouth after using that       I suggested continuation of Symbicort without interruption through the surgery     asthma is controlled . I suggest consideration of inhaled bronchodilator use if the patient has perioperative bronchospasm

## 2022-12-29 NOTE — OUTPATIENT SUBJECTIVE & OBJECTIVE
Outpatient Subjective & Objective     Chief complaint-Preoperative evaluation, Perioperative Medical management, complication reduction plan     Active cardiac conditions- {[facility] Active Cardiac Conditions:38953}    Revised cardiac risk index predictors- {[facility] Revised Cardiac Risk Index Predictors:52048}    Functional capacity -Examples of physical activity  {[facility] Functional Capacity:71735}----- She can undertake all the above activities without  chest pain,chest tightness, Shortness of breath ,dizziness,lightheadedness making her exercise tolerance more, less  than 4 Mets.   Winded on exertion, not new ,stable over time     Review of Systems    Past Medical History Pertinent Negatives:   Diagnosis Date Noted    Amblyopia 06/11/2014    Cataract 06/11/2014    Diabetes mellitus 10/11/2012    Diabetic retinopathy 10/11/2012    Diabetic retinopathy 06/11/2014    Glaucoma 10/11/2012    Glaucoma 06/11/2014    Macular degeneration 10/11/2012    Retinal detachment 10/11/2012    Sickle cell anemia 02/25/2015    Sickle cell trait 02/25/2015    Strabismus 10/11/2012    Uveitis 10/11/2012     Family History   Problem Relation Age of Onset    Cancer Mother         Lung Cancer    Emphysema Mother     Heart attack Mother     Alcohol abuse Mother     Cancer Father         Lung Cancer    Osteoarthritis Father     Lung cancer Father     Skin cancer Father     Alcohol abuse Father     Heart disease Brother     Heart attack Brother     Alcohol abuse Brother     Cirrhosis Brother     Osteoarthritis Paternal Aunt     Retinal detachment Maternal Aunt     No Known Problems Sister     No Known Problems Maternal Uncle     No Known Problems Paternal Uncle     No Known Problems Maternal Grandmother     No Known Problems Maternal Grandfather     No Known Problems Paternal Grandmother     No Known Problems Paternal Grandfather     Colon cancer Neg Hx     Esophageal cancer Neg Hx     Stomach cancer Neg Hx     Celiac disease Neg Hx     Diabetes Neg Hx      Thyroid disease Neg Hx     Amblyopia Neg Hx     Blindness Neg Hx     Cataracts Neg Hx     Glaucoma Neg Hx     Hypertension Neg Hx     Macular degeneration Neg Hx     Strabismus Neg Hx     Stroke Neg Hx      Past Surgical History:   Procedure Laterality Date    BREAST BIOPSY Left 01/29/2002    core bx    CARPAL TUNNEL RELEASE Right 05/2017    CHOLECYSTECTOMY  2004    COLONOSCOPY      10/11    COLONOSCOPY N/A 6/29/2017    Procedure: COLONOSCOPY;  Surgeon: López Moore MD;  [facility]  Service: Endoscopy;  Laterality: N/A;    EPIDURAL STEROID INJECTION N/A 11/18/2021    Procedure: INJECTION, STEROID, EPIDURAL, L5-S1 IL NEED CONSENT;  Surgeon: Tj Mayfield MD;  Location: [facility] PAIN MGT;  Service: Pain Management;  Laterality: N/A;    EPIDURAL STEROID INJECTION N/A 9/29/2022    Procedure: LUMBAR L3/L4 IL MADELINE CONTRAST;  Surgeon: Tj Mayfield MD;  Location: [facility] PAIN MGT;  Service: Pain Management;  Laterality: N/A;    EPIDURAL STEROID INJECTION N/A 12/12/2022    Procedure: INJECTION, STEROID, EPIDURAL L5/S1 IL CONTRAST;  Surgeon: Tj Mayfield MD;  Location: [facility] PAIN MGT;  Service: Pain Management;  Laterality: N/A;    INJECTION OF ANESTHETIC AGENT AROUND NERVE Bilateral 8/23/2021    Procedure: BLOCK, NERVE, MEDIAL BRANCH L3,L4,L5;  Surgeon: Tj Mayfield MD;  Location: [facility] PAIN MGT;  Service: Pain Management;  Laterality: Bilateral;  1 of 2    INJECTION OF ANESTHETIC AGENT AROUND NERVE Bilateral 8/26/2021    Procedure: BLOCK, NERVE, MEDIAL BRANCH L3,L4,L5;  Surgeon: Tj Mayfield MD;  Location: [facility] PAIN MGT;  Service: Pain Management;  Laterality: Bilateral;  2 of 2    INJECTION OF ANESTHETIC AGENT AROUND NERVE Left 7/7/2022    Procedure: BLOCK, NERVE, LEFT OBTURATOR AND FEMORAL;  Surgeon: Tj Mayfield MD;  Location: [facility] PAIN MGT;  Service: Pain Management;  Laterality: Left;    INJECTION OF FACET JOINT Bilateral 3/12/2020    Procedure: FACET JOINT INJECTION (LUMBAR BLOCK) BILATERAL L4-5 AND  L5-S1 DIRECT REFERRAL;  Surgeon: Tj Mayfield MD;  Location: [facility] [place] PAIN MGT;  Service: Pain Management;  Laterality: Bilateral;  NEEDS CONSENT    INJECTION OF FACET JOINT Bilateral 3/29/2021    Procedure: INJECTION, FACET JOINT L3/4, L4/5, L5/S1;  Surgeon: Tj Mayfield MD;  Location: [facility] [place] PAIN MGT;  Service: Pain Management;  Laterality: Bilateral;    INJECTION OF JOINT Right 7/30/2020    Procedure: INJECTION, JOINT, RIGHT HIP Interarticular under flouro;  Surgeon: Tj Mayfield MD;  Location: [facility] PAIN MGT;  Service: Pain Management;  Laterality: Right;  INJECTION, JOINT, RIGHT HIP Interarticular under flouro    INJECTION OF JOINT Right 2/21/2022    Procedure: Injection, Joint RIGHT ISCHIAL BURSA;  Surgeon: Tj Mayfield MD;  Location: [facility] [place] PAIN MGT;  Service: Pain Management;  Laterality: Right;    INTRAMEDULLARY RODDING OF FEMUR Left 5/21/2019    Procedure: INSERTION, INTRAMEDULLARY ARIEL, FEMUR;  Surgeon: López Duff MD;  Location: [facility] OR [address] [place] [address];  Service: Orthopedics;  Laterality: Left;    RADIOFREQUENCY ABLATION Left 9/20/2021    Procedure: RADIOFREQUENCY ABLATION left L3,4,5 RFA  1 of 2  CONSENT NEEDED;  Surgeon: Tj Mayfield MD;  Location: [facility] [place] PAIN MGT;  Service: Pain Management;  Laterality: Left;    RADIOFREQUENCY ABLATION Right 10/4/2021    Procedure: RADIOFREQUENCY ABLATION  right L3,4,5 RFA   2 of 2  CONSENT NEEDED;  Surgeon: Tj Mayfield MD;  Location: [facility] [place] PAIN MGT;  Service: Pain Management;  Laterality: Right;    RADIOFREQUENCY ABLATION Left 4/21/2022    Procedure: Radiofrequency Ablation LEFT L3,L4,L5;  Surgeon: Tj Mayfield MD;  Location: [facility] [place] PAIN MGT;  Service: Pain Management;  Laterality: Left;    RADIOFREQUENCY ABLATION Right 5/12/2022    Procedure: Radiofrequency Ablation RIGHT L3,L4,L5;  Surgeon: Tj Mayfield MD;  Location: [facility] [place] PAIN MGT;  Service: Pain Management;  Laterality: Right;    RADIOFREQUENCY ABLATION Left 7/25/2022    Procedure: Radiofrequency Ablation Left Femoral &  "Obturator;  Surgeon: Tj Mayfield MD;  Location: [facility] PAIN MGT;  Service: Pain Management;  Laterality: Left;    REPAIR OF TRICEPS TENDON Left 10/17/2018    Procedure: REPAIR, TENDON, TRICEPS left elbow;  Surgeon: Staci Yarbrough MD;  Location: [facility] OR [facility] FLR;  Service: Orthopedics;  Laterality: Left;  Anesthesia: General and regional. PRONE, k-wire , hand pan 1 and pan 2, CALL ARTHREX/Tamera notified 10-12 LO    TONSILLECTOMY      TRANSFORAMINAL EPIDURAL INJECTION OF STEROID Right 8/31/2020    Procedure: INJECTION, STEROID, EPIDURAL, TRANSFORAMINAL,  APPROACH, L3-L4 and L4-L5 need consent;  Surgeon: Tj Mayfield MD;  Location: [facility] PAIN MGT;  Service: Pain Management;  Laterality: Right;    TRANSFORAMINAL EPIDURAL INJECTION OF STEROID Bilateral 7/23/2021    Procedure: INJECTION, STEROID, EPIDURAL, TRANSFORAMINAL APPROACH L4/5;  Surgeon: Tj Mayfield MD;  Location: [facility] PAIN [facility]GT;  Service: Pain Management;  Laterality: Bilateral;    TRIGGER POINT INJECTION N/A 7/23/2021    Procedure: INJECTION, TRIGGER POINT SCAPULAR;  Surgeon: Tj Mayfield MD;  Location: [facility] PAIN MGT;  Service: Pain Management;  Laterality: N/A;    TUBAL LIGATION  2003    UPPER GASTROINTESTINAL ENDOSCOPY      10/11    uterine ablation  2003       No anesthesia, bleeding, cardiac problems with previous surgeries/procedures.  Medications and Allergies reviewed in epic.     Physical Exam  Blood pressure 128/76, pulse 87, temperature 99 °F (37.2 °C), height 5' 1" (1.549 m), weight 64.7 kg (142 lb 9.6 oz), SpO2 96 %.      Investigations  Lab and Imaging have been reviewed in epic.      Review of old records- Was done and information gathered regards to events leading to surgery and health conditions of significance in the perioperative period.    Outpatient Subjective & Objective   "

## 2022-12-29 NOTE — OUTPATIENT SUBJECTIVE & OBJECTIVE
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       Review of Systems   Constitutional:  Negative for chills and fever.        No unusual weight changes       HENT:          STOPBANG score  1/ 8\    Age over 50      Eyes:         No new visual changes   Respiratory:          No cough , phlegm    No Hemoptysis   Cardiovascular:         As noted   Gastrointestinal:         No overt GI/ blood losses  Bowel movements- Regular    Endocrine:        Prednisone use > 20 mg daily for 3 weeks- None   Genitourinary:  Negative for dysuria.        No urinary hesitancy    Musculoskeletal:         As above      Skin:  Negative for rash.   Neurological:  Negative for syncope.        No unilateral weakness   Hematological:         Current use of Anticoagulants  None   Psychiatric/Behavioral:          No Depression,Anxiety   No vascular stenting            No anesthesia, bleeding, cardiac problems with previous surgeries/procedures.  Medications and Allergies reviewed in epic.     FH- No anesthesia,bleeding / venous thrombosis , heart disease in family      Physical Exam  Blood pressure 128/76, pulse 87, temperature 99 °F (37.2 °C), height 5' 1" (1.549 m), weight 64.7 kg (142 lb 9.6 oz), SpO2 96 %.    I offered a gown and the presence of a chaperone during physical examination   She was comfortable to proceed with the exam without the the presence of a chaperone    Suggested to watch for fever    Physical Exam  Constitutional- Vitals - Body mass index is 26.94 kg/m².,   Vitals:    " 12/29/22 1306   BP: 128/76   Pulse: 87   Temp: 99 °F (37.2 °C)     General appearance-Conscious,Coherent  Eyes- No conjunctival icterus,pupils  round  and reactive to light   ENT-Oral cavity- moist    , Hearing grossly normal   Neck- No thyromegaly ,Trachea -central, No jugular venous distension,   No Carotid Bruit   Cardiovascular -Heart Sounds- Normal  and  no murmur   , No gallop rhythm   Respiratory - Normal Respiratory Effort, Normal breath sounds,  no wheeze , and  no forced expiratory wheeze    Peripheral pitting pedal edema-- mild, no calf pain   Gastrointestinal -Soft abdomen, No palpable masses, Non Tender,Liver,Spleen not palpable. No-- free fluid and shifting dullness  Musculoskeletal- No finger Clubbing. Strength grossly normal   Lymphatic-No Palpable cervical, axillary,Inguinal lymphadenopathy   Psychiatric - normal effect,Orientation  Rt Dorsalis pedis pulses-palpable    Lt Dorsalis pedis pulses- palpable   Rt Posterior tibial pulses -palpable   Left posterior tibial pulses -palpable   Miscellaneous -  no asterixis,  no dupuytren's contracture,  Surgical scarabdomen  , and  no renal bruit   Investigations  Lab and Imaging have been reviewed in [facility].    Review of Medicine tests    EKG- I had independently reviewed the EKG from--12/9/2022  It was reported to be showing     Normal sinus rhythm   Normal ECG   When compared with ECG of 14-JUN-2022 11:17,   No significant change was found     2020    There is 0-19% right Internal Carotid Stenosis.  There is 0-19% left Internal Carotid Stenosis.       Review of clinical lab tests:  Lab Results   Component Value Date    CREATININE 0.67 12/09/2022    HGB 12.7 12/09/2022     12/09/2022         Review of old records- Was done and information gathered regards to events leading to surgery and health conditions of significance in the perioperative period         Review of old records- Was done and information gathered regards to events leading to surgery and  health conditions of significance in the perioperative period.    Outpatient Subjective & Objective

## 2022-12-29 NOTE — ANESTHESIA PREPROCEDURE EVALUATION
"[facility]  Anesthesia Pre-Operative Evaluation         Patient Name: Joyce Peterson  YOB: 1960  MRN: [id number]    SUBJECTIVE:     Pre-operative evaluation for Procedure(s) (LRB):  SURGICAL PROCUREMENT, BONE GRAFT (Left)  FUSION, FOOT (Left)  LENGTHENING, TENDON, LOWER EXTREMITY (Left)     12/29/2022    Joyce Peterson is a 62 y.o. female w/ a significant PMHx of rheumatoid arthritis, gastroparesis, GERD, asthma, CAD,  and fibromyalgia. Pt reports history of two failed regional blocks. 1) SOB and chest tightness after a supraclavicular block. 2) "Leaking at catheter site" after Suprainguinal Fascia Iliaca Catheter. Pt requests only a staff anesthesiologist complete her regional block for this procedure. Pt also reports "allergy" of Zofran, she becomes extremely nauseous after zofran administration and only phenergan works for her.    Patient now presents for the above procedure(s).       Prev airway:  Method of Intubation: Direct laryngoscopy; Inserted by: Other; Airway Device: Endotracheal Tube; Mask Ventilation: Easy; Intubated: Postinduction; Blade: Ronal #3; Airway Device Size: 7.0; Style: Cuffed; Cuff Inflation: Minimal occlusive pressure; Placement Verified By: Auscultation, Capnometry; Grade: Grade I; Complicating Factors: None; Intubation Findings: Positive EtCO2, Bilateral breath sounds, Atraumatic/Condition of teeth unchanged;  Depth of Insertion: 22        Patient Active Problem List   Diagnosis    Osteoarthritis involving multiple joints on both sides of body    GERD (gastroesophageal reflux disease)    Gastroparesis    Steroid-induced osteoporosis    Thyroid nodule    Hyperlipidemia    Mild intermittent asthma without complication    Rheumatoid arthritis involving multiple sites    Low back pain    Iron deficiency anemia due to chronic blood loss    Research study patient    Sjogren's syndrome    Candidal esophagitis    Coronary artery disease " "involving native coronary artery of native heart without angina pectoris    Subclinical hyperthyroidism    Fibromyalgia    Dry eyes    Ureterocele    Urge urinary incontinence    Elevated serum GGT level    Transaminasemia    Lumbar radiculopathy    Stress fracture of left foot    Arthritis of foot    Pes planovalgus, acquired, left    Degenerative joint disease of hindfoot, left    Decreased strength, endurance, and mobility    Impaired tolerance of activity       Review of patient's allergies indicates:   Allergen Reactions    Actemra [tocilizumab] Other (See Comments)     Severe dizziness    Codeine Nausea And Vomiting and Hives    Gold au 198 Hives and Rash    Hydroxychloroquine Other (See Comments)     Can't remember the reaction      Iodinated contrast media Other (See Comments)     Other reaction(s): BURNING ALL OVER    Iodine Other (See Comments) and Hives     Other reaction(s): BURNING ALL OVER - Iodine dye - Not topical    Ondansetron Nausea And Vomiting    Sulfa (sulfonamide antibiotics) Other (See Comments)     Can't remember the reaction    Zofran [ondansetron hcl (pf)] Nausea And Vomiting     Pt reports that last time she received zofran she started vomiting again    Methotrexate analogues Nausea Only    Pneumococcal vaccine Other (See Comments)    Pneumovax 23 [pneumococcal 23-argenis ps vaccine] Other (See Comments)     "sick"    Methotrexate Nausea Only       Current Inpatient Medications:      Current Outpatient Medications on File Prior to Encounter   Medication Sig Dispense Refill    acetaminophen (TYLENOL) 500 MG tablet Take 2 tablets (1,000 mg total) by mouth 3 (three) times daily. for 7 days 42 tablet 0    albuterol (PROVENTIL/VENTOLIN HFA) 90 mcg/actuation inhaler INHALE 2 PUFFS INTO THE LUNGS EVERY 6 (SIX) HOURS AS NEEDED. RESCUE 20.1 g 11    albuterol-ipratropium (DUO-NEB) 2.5 mg-0.5 mg/3 mL nebulizer solution Take 3 mLs by nebulization every 6 (six) hours as " needed for Wheezing. Rescue 90 mL 3    ALPRAZolam (XANAX) 0.5 MG tablet Take Once on call to procedure 1 tablet 0    aspirin 81 mg Tab Take 81 mg by mouth every morning.       budesonide-formoterol 160-4.5 mcg (SYMBICORT) 160-4.5 mcg/actuation HFAA INHALE 2 PUFFS INTO THE LUNGS EVERY 12 (TWELVE) HOURS. 30.6 g 3    calcium citrate-vitamin D3 315-200 mg (CITRACAL+D) 315 mg-5 mcg (200 unit) per tablet Take 1 tablet by mouth 2 (two) times daily.       cefadroxil (DURICEF) 500 MG Cap Take 1 capsule (500 mg total) by mouth every 12 (twelve) hours. for 10 days 20 capsule 0    clindamycin (CLEOCIN) 300 MG capsule Take 2 capsules by mouth now, then take 1 capsule 4 times daily until finished 28 capsule 7    cycloSPORINE (RESTASIS) 0.05 % ophthalmic emulsion Instill one drop into both eyes two times per day 180 each 3    diazePAM (VALIUM) 10 MG Tab Take 1 tablet (10 mg total) by mouth once as needed. 1 tablet 0    diclofenac sodium (VOLTAREN) 1 % Gel APPLY 2 GRAMS TOPICALLY 4 (FOUR) TIMES DAILY AS NEEDED.  Strength: 1 % 300 g 2    estrogens, conjugated, (PREMARIN) 0.625 MG tablet take 1 tablet by mouth daily 90 tablet 4    fluconazole (DIFLUCAN) 100 MG tablet Take 1 tablet by mouth once daily for 14 days. 14 tablet 0    halobetasol propion-tazarotene (DUOBRII) 0.01-0.045 % Lotn aaa on feet and hands qhs  then vaseline and warm towel (Patient taking differently: aaa on feet and hands qhs  then vaseline and warm towel) 100 g 3    ibuprofen (ADVIL,MOTRIN) 800 MG tablet Take 1 tablet (800 mg total) by mouth every 6 to 8 hours as needed for pain. 30 tablet 0    INV PROPULSID 10 MG Take 2 tablets (20 mg) by mouth 4 (four) times daily. FOR INVESTIGATIONAL USE ONLY. Protocol [id number]  Subject ID: [id number]. [id number] each 0    ketoconazole (NIZORAL) 2 % cream Apply to feet twice daily for 3 weeks (Patient taking differently: Apply to feet twice daily for 3 weeks) 60 g 3    leflunomide (ARAVA) 20 MG Tab TAKE 1 TABLET BY  MOUTH EVERY DAY 90 tablet 0    lifitegrast (XIIDRA) 5 % Dpet INSTILL ONE DROP INTO BOTH EYES TWICE A DAY 60 mL 10    magic mouthwash diphen/antac/lidoc Swish and Spit 5 mL by mouth for 30 seconds twice daily. 150 mL 0    methenamine (HIPREX) 1 gram Tab Take 1 tablet (1 g total) by mouth 2 (two) times daily. 180 tablet 3    methocarbamoL (ROBAXIN) 750 MG Tab Take 1 tablet (750 mg total) by mouth 3 (three) times daily. for 14 days 42 tablet 0    methylPREDNISolone (MEDROL DOSEPACK) 4 mg tablet use as directed on package 21 tablet 0    mirabegron (MYRBETRIQ) 25 mg Tb24 ER tablet Take 1 tablet (25 mg total) by mouth once daily. 90 tablet 3    montelukast (SINGULAIR) 10 mg tablet TAKE 1 TABLET BY MOUTH EVERY DAY AT NIGHT 90 tablet 3    naproxen (NAPROSYN) 500 MG tablet Take 1 tablet (500 mg total) by mouth 2 (two) times daily as needed. 180 tablet 0    omeprazole (PRILOSEC) 40 MG capsule Take 1 capsule (40 mg total) by mouth every morning. 30 capsule 6    omeprazole-sodium bicarbonate (ZEGERID) 40-1.1 mg-gram per capsule TAKE 1 CAPSULE BY MOUTH EVERY DAY IN THE MORNING 90 capsule 3    oxyCODONE (ROXICODONE) 5 MG immediate release tablet Take 1 tablet (5 mg total) by mouth every 4 (four) hours as needed for Pain. 30 tablet 0    POTASSIUM ORAL Take by mouth once daily.      predniSONE (DELTASONE) 1 MG tablet Take 5 tablets (5 mg total) by mouth once daily. 450 tablet 1    predniSONE (DELTASONE) 5 MG tablet TAKE 1 TABLET BY MOUTH EVERY DAY 90 tablet 1    pregabalin (LYRICA) 50 MG capsule Take 3 capsules (150 mg total) by mouth every evening. 270 capsule 1    PREMARIN vaginal cream PLACE 0.5 GRAM VAGINALLY 3 (THREE) TIMES A WEEK. 30 g 1    progesterone (PROMETRIUM) 100 MG capsule Take 1 capsule by mouth daily. 90 capsule 1    progesterone (PROMETRIUM) 100 MG capsule take 1 capsule by mouth daily 90 capsule 4    promethazine (PHENERGAN) 25 MG tablet Take 1 tablet (25 mg total) by mouth every 6 (six) hours  as needed for Nausea. 30 tablet 1    promethazine (PHENERGAN) 25 MG tablet Take 1 tablet (25 mg total) by mouth every 6 (six) hours as needed for Nausea. 15 tablet 0    rosuvastatin (CRESTOR) 20 MG tablet Take 1 tablet (20 mg total) by mouth every evening. 90 tablet 3    sarilumab (KEVZARA) 200 mg/1.14 mL Syrg Inject 1.14 mL (200 mg total) into the skin every 14 (fourteen) days. 2.28 mL 2    sucralfate (CARAFATE) 1 gram tablet TAKE 1 TABLET (1 G TOTAL) BY MOUTH 4 (FOUR) TIMES DAILY. 360 tablet 2    sucralfate (CARAFATE) 1 gram tablet Take 1 tablet (1 g total) by mouth 4 (four) times daily. 360 tablet 3    traMADoL (ULTRAM) 50 mg tablet TAKE 1 TABLET BY MOUTH EVERY 12 HOURS AS NEEDED FOR PAIN. 60 tablet 1     No current facility-administered medications on file prior to encounter.       Past Surgical History:   Procedure Laterality Date    BREAST BIOPSY Left 01/29/2002    core bx    CARPAL TUNNEL RELEASE Right 05/2017    CHOLECYSTECTOMY  2004    COLONOSCOPY      10/11    COLONOSCOPY N/A 6/29/2017    Procedure: COLONOSCOPY;  Surgeon: López Moore MD;  Location: [facility] ENDO ([address] [place] [address]);  Service: Endoscopy;  Laterality: N/A;    EPIDURAL STEROID INJECTION N/A 11/18/2021    Procedure: INJECTION, STEROID, EPIDURAL, L5-S1 IL NEED CONSENT;  Surgeon: Tj Mayfield MD;  Location: [facility] PAIN MGT;  Service: Pain Management;  Laterality: N/A;    EPIDURAL STEROID INJECTION N/A 9/29/2022    Procedure: LUMBAR L3/L4 IL MADELINE CONTRAST;  Surgeon: Tj Mayfield MD;  Location: [facility] PAIN MGT;  Service: Pain Management;  Laterality: N/A;    EPIDURAL STEROID INJECTION N/A 12/12/2022    Procedure: INJECTION, STEROID, EPIDURAL L5/S1 IL CONTRAST;  Surgeon: Tj Mayfield MD;  Location: [facility] PAIN MGT;  Service: Pain Management;  Laterality: N/A;    INJECTION OF ANESTHETIC AGENT AROUND NERVE Bilateral 8/23/2021    Procedure: BLOCK, NERVE, MEDIAL BRANCH L3,L4,L5;  Surgeon: Tj Mayfield MD;  Location: [facility] PAIN MGT;  Service: Pain  Management;  Laterality: Bilateral;  1 of 2    INJECTION OF ANESTHETIC AGENT AROUND NERVE Bilateral 8/26/2021    Procedure: BLOCK, NERVE, MEDIAL BRANCH L3,L4,L5;  Surgeon: Tj Mayfield MD;  Location: [facility] PAIN MGT;  Service: Pain Management;  Laterality: Bilateral;  2 of 2    INJECTION OF ANESTHETIC AGENT AROUND NERVE Left 7/7/2022    Procedure: BLOCK, NERVE, LEFT OBTURATOR AND FEMORAL;  Surgeon: Tj Mayfield MD;  Location: [facility] PAIN MGT;  Service: Pain Management;  Laterality: Left;    INJECTION OF FACET JOINT Bilateral 3/12/2020    Procedure: FACET JOINT INJECTION (LUMBAR BLOCK) BILATERAL L4-5 AND L5-S1 DIRECT REFERRAL;  Surgeon: Tj Mayfield MD;  Location: [facility] PAIN MGT;  Service: Pain Management;  Laterality: Bilateral;  NEEDS CONSENT    INJECTION OF FACET JOINT Bilateral 3/29/2021    Procedure: INJECTION, FACET JOINT L3/4, L4/5, L5/S1;  Surgeon: Tj Mayfield MD;  Location: [facility] PAIN MGT;  Service: Pain Management;  Laterality: Bilateral;    INJECTION OF JOINT Right 7/30/2020    Procedure: INJECTION, JOINT, RIGHT HIP Interarticular under flouro;  Surgeon: Tj Mayfield MD;  Location: [facility] PAIN MGT;  Service: Pain Management;  Laterality: Right;  INJECTION, JOINT, RIGHT HIP Interarticular under flouro    INJECTION OF JOINT Right 2/21/2022    Procedure: Injection, Joint RIGHT ISCHIAL BURSA;  Surgeon: Tj Mayfield MD;  Location: [facility] PAIN MGT;  Service: Pain Management;  Laterality: Right;    INTRAMEDULLARY RODDING OF FEMUR Left 5/21/2019    Procedure: INSERTION, INTRAMEDULLARY ARIEL, FEMUR;  Surgeon: López Duff MD;  Location: [facility] OR 2ND FLR;  Service: Orthopedics;  Laterality: Left;    RADIOFREQUENCY ABLATION Left 9/20/2021    Procedure: RADIOFREQUENCY ABLATION left L3,4,5 RFA  1 of 2  CONSENT NEEDED;  Surgeon: Tj Mayfield MD;  Location: [facility] PAIN MGT;  Service: Pain Management;  Laterality: Left;    RADIOFREQUENCY ABLATION Right 10/4/2021    Procedure: RADIOFREQUENCY ABLATION  right L3,4,5 RFA   2  of 2  CONSENT NEEDED;  Surgeon: Tj Mayfield MD;  Location: [facility] PAIN MGT;  Service: Pain Management;  Laterality: Right;    RADIOFREQUENCY ABLATION Left 4/21/2022    Procedure: Radiofrequency Ablation LEFT L3,L4,L5;  Surgeon: Tj Mayfield MD;  Location: [facility] PAIN MGT;  Service: Pain Management;  Laterality: Left;    RADIOFREQUENCY ABLATION Right 5/12/2022    Procedure: Radiofrequency Ablation RIGHT L3,L4,L5;  Surgeon: Tj Mayfield MD;  Location: [facility] PAIN MGT;  Service: Pain Management;  Laterality: Right;    RADIOFREQUENCY ABLATION Left 7/25/2022    Procedure: Radiofrequency Ablation Left Femoral & Obturator;  Surgeon: Tj Mayfield MD;  Location: [facility] PAIN MGT;  Service: Pain Management;  Laterality: Left;    REPAIR OF TRICEPS TENDON Left 10/17/2018    Procedure: REPAIR, TENDON, TRICEPS left elbow;  Surgeon: Staic Yarbrough MD;  [facility] [place] [address] [place] [address];  Service: Orthopedics;  Laterality: Left;  Anesthesia: General and regional. PRONE, k-wire , hand pan 1 and pan 2, CALL ARTHREX/Tamera notified 10-12 LO    TONSILLECTOMY      TRANSFORAMINAL EPIDURAL INJECTION OF STEROID Right 8/31/2020    Procedure: INJECTION, STEROID, EPIDURAL, TRANSFORAMINAL,  APPROACH, L3-L4 and L4-L5 need consent;  Surgeon: Tj Mayfield MD;  Location: [facility] PAIN MGT;  Service: Pain Management;  Laterality: Right;    TRANSFORAMINAL EPIDURAL INJECTION OF STEROID Bilateral 7/23/2021    Procedure: INJECTION, STEROID, EPIDURAL, TRANSFORAMINAL APPROACH L4/5;  Surgeon: Tj Mayfield MD;  Location: [facility] PAIN MGT;  Service: Pain Management;  Laterality: Bilateral;    TRIGGER POINT INJECTION N/A 7/23/2021    Procedure: INJECTION, TRIGGER POINT SCAPULAR;  Surgeon: Tj Mayfield MD;  Location: [facility] PAIN [facility]GT;  Service: Pain Management;  Laterality: N/A;    TUBAL LIGATION  2003    UPPER GASTROINTESTINAL ENDOSCOPY      10/11    uterine ablation  2003       Social History     Socioeconomic History    Marital status:    Tobacco  Use    Smoking status: Never    Smokeless tobacco: Never   Substance and Sexual Activity    Alcohol use: Yes     Comment: 2-3 times per year.    Drug use: No    Sexual activity: Yes     Partners: Male     Birth control/protection: Surgical     Social Determinants of Health     Financial Resource Strain: Low Risk     Difficulty of Paying Living Expenses: Not hard at all   Food Insecurity: Unknown    Worried About Running Out of Food in the Last Year: Patient refused    Ran Out of Food in the Last Year: Patient refused   Transportation Needs: Unknown    Lack of Transportation (Medical): Patient refused    Lack of Transportation (Non-Medical): Patient refused   Physical Activity: Unknown    Days of Exercise per Week: 2 days   Stress: No Stress Concern Present    Feeling of Stress : Not at all   Social Connections: Unknown    Frequency of Communication with Friends and Family: Twice a week    Frequency of Social Gatherings with Friends and Family: Twice a week    Active Member of Clubs or Organizations: Yes    Attends Club or Organization Meetings: Patient refused    Marital Status:    Housing Stability: Low Risk     Unable to Pay for Housing in the Last Year: No    Number of Places Lived in the Last Year: 1    Unstable Housing in the Last Year: No       OBJECTIVE:     Vital Signs Range (Last 24H):  Temp:  [37.2 °C (99 °F)]   Pulse:  [87]   BP: (128)/(76)   SpO2:  [96 %]       Significant Labs:  Lab Results   Component Value Date    WBC 3.43 (L) 12/09/2022    HGB 12.7 12/09/2022    HCT 38.4 12/09/2022     12/09/2022    CHOL 183 10/03/2022    TRIG 127 10/03/2022    HDL 76 (H) 10/03/2022    ALT 33 12/09/2022    AST 51 (H) 12/09/2022     12/09/2022    K 3.6 12/09/2022     12/09/2022    CREATININE 0.67 12/09/2022    BUN 14 12/09/2022    CO2 28 12/09/2022    TSH 0.619 10/18/2022    INR 1.0 12/16/2015    GLUF 78 02/06/2017    HGBA1C 5.2 03/15/2022       Diagnostic Studies: No  relevant studies.    EKG:   Results for orders placed or performed in visit on 12/09/22   EKG 12-lead    Collection Time: 12/09/22 10:02 AM    Narrative    Test Reason : K31.84,    Vent. Rate : 081 BPM     Atrial Rate : 081 BPM     P-R Int : 146 ms          QRS Dur : 068 ms      QT Int : 374 ms       P-R-T Axes : 033 019 033 degrees     QTc Int : 434 ms    Normal sinus rhythm  Normal ECG  When compared with ECG of 14-JUN-2022 11:17,  No significant change was found  Confirmed by Kosta ENRIQUEZ MD, Tadeo YANEZ ([id number]) on 12/9/2022 1:08:17 PM    Referred By: OMKAR LOCK           Confirmed By:Tadeo Brambila III, MD       2D ECHO:  TTE:  No results found. However, due to the size of the patient record, not all encounters were searched. Please check Results Review for a complete set of results.    KADE:  No results found. However, due to the size of the patient record, not all encounters were searched. Please check Results Review for a complete set of results.    ASSESSMENT/PLAN:                                                                                                                  12/29/2022  Joyce Peterson is a 62 y.o., female.      Pre-op Assessment    I have reviewed the Patient Summary Reports.       I have reviewed the Medications.   Steroids Taken In Past Year: Prednisone    Review of Systems  Anesthesia Hx:  History of prior surgery of interest to airway management or planning: Personal Hx of Anesthesia complications, Post-Operative Nausea/Vomiting (avoid zofran)   Social:  Non-Smoker    Cardiovascular:   CAD      Pulmonary:   Asthma    Renal/:  Renal/ Normal     Hepatic/GI:   GERD gastroparesis   Musculoskeletal:   Arthritis     Neurological:   Neuromuscular Disease,    Endocrine:  Endocrine Normal        Physical Exam  General: Well nourished, Cooperative, Alert and Oriented    Airway:  Mallampati: II / I  Mouth Opening: Normal  TM Distance: Normal  Tongue: Normal  Neck ROM: Normal ROM, Extension  Painful    Dental:  Intact        Anesthesia Plan  Type of Anesthesia, risks & benefits discussed:    Anesthesia Type: MAC, Regional  Intra-op Monitoring Plan: Standard ASA Monitors  Post Op Pain Control Plan: multimodal analgesia  Induction:  IV  Airway Plan: Direct, Post-Induction  Informed Consent: Informed consent signed with the Patient and all parties understand the risks and agree with anesthesia plan.  All questions answered.   ASA Score: 2  Day of Surgery Review of History & Physical: H&P Update referred to the surgeon/provider.    Ready For Surgery From Anesthesia Perspective.     .

## 2022-12-29 NOTE — ASSESSMENT & PLAN NOTE
Dec th 2022  I suggested the perioperative care team be aware of this so that appropriate preventative care can be undertaken

## 2022-12-29 NOTE — ASSESSMENT & PLAN NOTE
She is on calcium and vitamin-D supplementation   She is on Prolia since 6 months  She has had longstanding steroid use but is currently down to 3 mg of prednisone once a day     She had 3 left hip fractures and multiple feet bone fractures on both sides  She has had no recent fractures     She is under endocrinology care\

## 2022-12-29 NOTE — ASSESSMENT & PLAN NOTE
She is on a study trial and is on propulsid  Her other medication include Carafate, omeprazole, Zegerid    She is doing very well with the gastroparesis  She normally has her dinner at about 5:00 p.m.   Given the gastroparesis I suggested to not eat after 5:00 p.m. the night before surgery

## 2022-12-29 NOTE — ASSESSMENT & PLAN NOTE
She is under cardiology care    2020     The stress echo portion of this study is negative for myocardial ischemia.   The ECG portion of this study is negative for myocardial ischemia.   During stress, the following significant arrhythmias were observed: occasional PACs.   Normal left ventricular systolic function. The estimated ejection fraction is 55%.   No wall motion abnormalities.   Normal LV diastolic function.   Normal right ventricular systolic function.   Mild tricuspid regurgitation.   The estimated PA systolic pressure is 29 mmHg.   Normal central venous pressure (3 mmHg).     She does not have any chest pain, chest tightness or breathing problems  She stays fairly active despite limitation with the left foot   She does grocery she does housework and she can take a flight of stairs with no exertional chest pain chest tightness short of breath dizziness lightheadedness   She seems to be doing well from a coronary disease standpoint   She is on aspirin once a day and a plan is for her to continue taking aspirin through the surgery

## 2022-12-29 NOTE — ASSESSMENT & PLAN NOTE
She is on local estrogen and systemic estrogen and systemic progesterone    She has been on hormone replacement therapy for the last 10 - 15 years for hot flashes, mood changes    On hormone replacement therapy   Risk / benefits ofhormone replacement therpay   use in the perioperative period discussed   Risk of thrombosis discussed with hormone replacement therpay  use  Preferable to hold hormone replacement therpay 1-2 weeks pre op until  Mobility returns to base line   She choose to hold hormone replacement therapy 2 weeks before surgery    She still has an intact uterus and I have informed her that with holding hormone replacement therapy that is a risk of her having withdrawal vaginal bleeding and I suggested to discuss with her gynecologist who is not in the [facility]

## 2022-12-29 NOTE — ASSESSMENT & PLAN NOTE
He is on prednisone 3 mg a day for the past 6 months   I suggested continuation offered during the perioperative time     Steroid treatment- I suggest monitoring the patient for any suggestions of adrenal insufficiency in view of the recent steroid use    Given that she is taking less than 5 mg a day of prednisone she might not require stress dose steroids in preparation for surgery

## 2022-12-29 NOTE — ASSESSMENT & PLAN NOTE
She does not have a history of alcohol excess   Her fatty liver is related to her weight   She has lost about 30 lb from not eating very well from her left hip area pain from heterotopic ossification    She received injection treatment for the heterotopic ossification about 3 months ago and she is doing well and since then she is eating better and maintaining her weight    She does not feel frail    Given that she lost about 30 lb albeit be non intentionally her fatty liver would have gotten better with the weight loss    March 2022-Fibrosis Stage:  F 0-1    Her liver enzyme AST from December was mildly elevated    Suggest care with usage of Medication that are hepatotoxic or  Metabolized in the liver     She has no suggestions of cirrhosis of liver and her most recent platelet count is normal    US Jan 2022  Liver: 14.1 cm, homogeneous parenchymal echotexture. No focal lesions.        Spleen: 8 cm.  Normal in size with homogeneous echotexture.     Miscellaneous: No ascites.      No history  of cirrhosis of liver or suggestions of Liver  decompensation   No Jaundice , dark urine , pale stool

## 2022-12-29 NOTE — ASSESSMENT & PLAN NOTE
Recent ultrasound scan showed no deep vein thrombosis    Edema- I suggested avoidance of added salt,avoidance of NSAID's, unless advised or ordered  and suggested Limb elevation and hiral hose use

## 2022-12-29 NOTE — ASSESSMENT & PLAN NOTE
She is under rheumatology care and is currently taking leflunomide, naproxen, prednisone 3 mg a day and on the current dose for 6 months,Dom    She seems to be doing fairly well with her rheumatoid arthritis    Given her upcoming surgery and her rheumatoid arthritis I have asked for a cervical spine x-ray flexion-extension AP lateral views to check for potential atlantoaxial instability    She gets Sarilumab once in 2 weeks- last used Dec 26 th 2022-  She is skipping her dose on January 9th and and January 23rd and is having post surgery follow-up evaluation on January 25th to decide on the wound healing    As per chart -For foot/ankle surgery: schedule week 3 since last sarilumab dose; resume sarilumab 2 wks post op. Continue leflunomide through surgery. Continue prednisone through surgery. Hold naproxen 1 wk pre-op

## 2022-12-29 NOTE — ASSESSMENT & PLAN NOTE
She has Sjogren's   She uses artificial tears    She also has dryness of the mouth and she uses dry mouth rinse over-the-counter

## 2022-12-29 NOTE — HPI
History of present illness- I had the pleasure of meeting this pleasant 62 y.o. lady in the pre op clinic prior to her elective Orthopedic surgery. The patient is new to me . Mrs Cook was accompanied by  Mr Hicks.    I have obtained the history by speaking to the patient and by reviewing the electronic health records.    Events leading up to surgery / History of presenting illness -    Has been troubled with the left foot  for 3-4 years  Her left foot is flat and it rolls inwards and she has pain upon walking    She has been troubled with severe   pain for Left foot 2 years    Pain increases with  weight-bearing and activity and decreases with resting.    She is taking Advil, naproxen for the pain-I suggested holding these 2 agents 1 week before surgery  She is been taking 1 tramadol a day for about 2 years for the left foot pain    To her understanding rheumatoid arthritis has affected her left foot  She has not had any previous left foot surgeries      Relevant health conditions of significance for the perioperative period/ History of presenting illness -    Subjectively describes health as good     Health conditions of significance for the perioperative period       - Rheumatoid arthritis  - Asthma  - Osteoporosis  - Gastroparesis  - Fatty liver   - Chronic pain  - ASA use  - HRT  - Acid reflux  - Chronic prednisone use     She lives with her  in a single-level house  They have 5 steps to get into the house  They both are retired and she has help available after surgery  They have 2 grown sons who live close by

## 2022-12-29 NOTE — ASSESSMENT & PLAN NOTE
She has chronic pain from fibromyalgia and rheumatoid arthritis  She is on Advil, Aleve and tramadol    He takes half tablet of tramadol up to twice a day for the past 2 years       Chronic continuous opioid use- In view of the opioid use, the patient may have opioid tolerance . I suggest considering the possibility of opioid tolerance  in planning post operative pain control     Discussed possibly reducing the opioid use in preparation for surgery , so that it reduces the opioid tolerance which helps post op pain control     Suggested letting pain management know about the up coming surgery

## 2022-12-29 NOTE — PROGRESS NOTES
[facility] [address]  Progress Note    Patient Name: Joyce Peterson  MRN: [id number]  Date of Evaluation- 01/10/2023  PCP- Krystal Singleton, DO    Future cases for Joyce Peterson [[id number]]       Case ID Status Date Time Nomi Procedure Provider Location    [id number] [facility] 1/11/2023  7:30  SURGICAL PROCUREMENT, BONE GRAFT Ariella Finnegan MD [[id number]] [facility]            HPI:  History of present illness- I had the pleasure of meeting this pleasant 62 y.o. lady in the pre op clinic prior to her elective Orthopedic surgery. The patient is new to me . Mrs Cook was accompanied by  Mr Hicks.    I have obtained the history by speaking to the patient and by reviewing the electronic health records.    Events leading up to surgery / History of presenting illness -    Has been troubled with the left foot  for 3-4 years  Her left foot is flat and it rolls inwards and she has pain upon walking    She has been troubled with severe   pain for Left foot 2 years    Pain increases with  weight-bearing and activity and decreases with resting.    She is taking Advil, naproxen for the pain-I suggested holding these 2 agents 1 week before surgery  She is been taking 1 tramadol a day for about 2 years for the left foot pain    To her understanding rheumatoid arthritis has affected her left foot  She has not had any previous left foot surgeries      Relevant health conditions of significance for the perioperative period/ History of presenting illness -    Subjectively describes health as good     Health conditions of significance for the perioperative period       - Rheumatoid arthritis  - Asthma  - Osteoporosis  - Gastroparesis  - Fatty liver   - Chronic pain  - ASA use  - HRT  - Acid reflux  - Chronic prednisone use     She lives with her  in a single-level house  They have 5 steps to get into the house  They both are retired and she has help available after surgery  They have 2 grown sons who live close  "by        Subjective/ Objective:     Chief complaint-Preoperative evaluation, Perioperative Medical management, complication reduction plan     Active cardiac conditions- none    Revised cardiac risk index predictors- none    Functional capacity -Examples of physical activity  house work and can take 1 flight of stairs----- She can undertake all the above activities without  chest pain,chest tightness, Shortness of breath ,dizziness,lightheadedness making her exercise tolerance more,  than 4 Mets.       Review of Systems   Constitutional:  Negative for chills and fever.        No unusual weight changes       HENT:          STOPBANG score  1/ 8\    Age over 50      Eyes:         No new visual changes   Respiratory:          No cough , phlegm    No Hemoptysis   Cardiovascular:         As noted   Gastrointestinal:         No overt GI/ blood losses  Bowel movements- Regular    Endocrine:        Prednisone use > 20 mg daily for 3 weeks- None   Genitourinary:  Negative for dysuria.        No urinary hesitancy    Musculoskeletal:         As above      Skin:  Negative for rash.   Neurological:  Negative for syncope.        No unilateral weakness   Hematological:         Current use of Anticoagulants  None   Psychiatric/Behavioral:          No Depression,Anxiety   No vascular stenting            No anesthesia, bleeding, cardiac problems with previous surgeries/procedures.  Medications and Allergies reviewed in epic.     FH- No anesthesia,bleeding / venous thrombosis , heart disease in family      Physical Exam  Blood pressure 128/76, pulse 87, temperature 99 °F (37.2 °C), height 5' 1" (1.549 m), weight 64.7 kg (142 lb 9.6 oz), SpO2 96 %.    I offered a gown and the presence of a chaperone during physical examination   She was comfortable to proceed with the exam without the the presence of a chaperone    Suggested to watch for fever    Physical Exam  Constitutional- Vitals - Body mass index is 26.94 kg/m².,   Vitals:    " 12/29/22 1306   BP: 128/76   Pulse: 87   Temp: 99 °F (37.2 °C)     General appearance-Conscious,Coherent  Eyes- No conjunctival icterus,pupils  round  and reactive to light   ENT-Oral cavity- moist    , Hearing grossly normal   Neck- No thyromegaly ,Trachea -central, No jugular venous distension,   No Carotid Bruit   Cardiovascular -Heart Sounds- Normal  and  no murmur   , No gallop rhythm   Respiratory - Normal Respiratory Effort, Normal breath sounds,  no wheeze , and  no forced expiratory wheeze    Peripheral pitting pedal edema-- mild, no calf pain   Gastrointestinal -Soft abdomen, No palpable masses, Non Tender,Liver,Spleen not palpable. No-- free fluid and shifting dullness  Musculoskeletal- No finger Clubbing. Strength grossly normal   Lymphatic-No Palpable cervical, axillary,Inguinal lymphadenopathy   Psychiatric - normal effect,Orientation  Rt Dorsalis pedis pulses-palpable    Lt Dorsalis pedis pulses- palpable   Rt Posterior tibial pulses -palpable   Left posterior tibial pulses -palpable   Miscellaneous -  no asterixis,  no dupuytren's contracture,  Surgical scarabdomen  , and  no renal bruit   Investigations  Lab and Imaging have been reviewed in [facility].    Review of Medicine tests    EKG- I had independently reviewed the EKG from--12/9/2022  It was reported to be showing     Normal sinus rhythm   Normal ECG   When compared with ECG of 14-JUN-2022 11:17,   No significant change was found     2020    There is 0-19% right Internal Carotid Stenosis.  There is 0-19% left Internal Carotid Stenosis.       Review of clinical lab tests:  Lab Results   Component Value Date    CREATININE 0.67 12/09/2022    HGB 12.7 12/09/2022     12/09/2022         Review of old records- Was done and information gathered regards to events leading to surgery and health conditions of significance in the perioperative period         Review of old records- Was done and information gathered regards to events leading to surgery and  health conditions of significance in the perioperative period.        Preoperative cardiac risk assessment-  The patient does not have any active cardiac conditions . Revised cardiac risk index predictors- 0---.Functional capacity is more than 4 Mets. She will be undergoing a Orthopedic procedure that carries a Moderate Risk risk     Risk of a major Cardiac event ( Defined as death, myocardial infarction, or cardiac arrest at 30 days after noncardiac surgery), based on RCRI score     -3.9%     No further cardiac work up is indicated prior to proceeding with the surgery     Orders Placed This Encounter    X-Ray Cervical Spine AP Lat with Flexion  Extension       American Society of Anesthesiologists Physical status classification ( ASA ) class: 2     Postoperative pulmonary complication risk assessment:         Radha Respiratory failure index- percentage risk of respiratory failure: 0.5 %    Assessment/Plan:     Lumbar radiculopathy  She is gets injection treatment to her back   She seems to doing fairly well with this    Dry eyes  She has Sjogren's   She uses artificial tears    She also has dryness of the mouth and she uses dry mouth rinse over-the-counter    Mild intermittent asthma without complication  She is doing very well with asthma and has not required as needed medication in 2 years time   She uses Symbicort on a scheduled basis and she knows about rinsing mouth after using that       I suggested continuation of Symbicort without interruption through the surgery     asthma is controlled . I suggest consideration of inhaled bronchodilator use if the patient has perioperative bronchospasm       Coronary artery disease involving native coronary artery of native heart without angina pectoris  She is under cardiology care    2020    The stress echo portion of this study is negative for myocardial ischemia.  The ECG portion of this study is negative for myocardial ischemia.  During stress, the following  significant arrhythmias were observed: occasional PACs.  Normal left ventricular systolic function. The estimated ejection fraction is 55%.  No wall motion abnormalities.  Normal LV diastolic function.  Normal right ventricular systolic function.  Mild tricuspid regurgitation.  The estimated PA systolic pressure is 29 mmHg.  Normal central venous pressure (3 mmHg).     She does not have any chest pain, chest tightness or breathing problems  She stays fairly active despite limitation with the left foot   She does grocery she does housework and she can take a flight of stairs with no exertional chest pain chest tightness short of breath dizziness lightheadedness   She seems to be doing well from a coronary disease standpoint   She is on aspirin once a day and a plan is for her to continue taking aspirin through the surgery    Hyperlipidemia  HLD-I  suggest continuation of statin during the entire perioperative period.    Urge urinary incontinence  She is doing very well taking Myrbetriq,, methenamine  She has no urinary pain    Rheumatoid arthritis involving multiple sites  She is under rheumatology care and is currently taking leflunomide, naproxen, prednisone 3 mg a day and on the current dose for 6 months,Kevzara    She seems to be doing fairly well with her rheumatoid arthritis    Given her upcoming surgery and her rheumatoid arthritis I have asked for a cervical spine x-ray flexion-extension AP lateral views to check for potential atlantoaxial instability    She gets Sarilumab once in 2 weeks- last used Dec 26 th 2022-  She is skipping her dose on January 9th and and January 23rd and is having post surgery follow-up evaluation on January 25th to decide on the wound healing    As per chart -For foot/ankle surgery: schedule week 3 since last sarilumab dose; resume sarilumab 2 wks post op. Continue leflunomide through surgery. Continue prednisone through surgery. Hold naproxen 1 wk pre-op    Thyroid nodule  She has  no problems swallowing and the most recent thyroid function test from Oct 2022was normal    Gastroparesis  She is on a study trial and is on propulsid  Her other medication include Carafate, omeprazole, Zegerid    She is doing very well with the gastroparesis  She normally has her dinner at about 5:00 p.m.   Given the gastroparesis I suggested to not eat after 5:00 p.m. the night before surgery    GERD (gastroesophageal reflux disease)  She is doing good with acid reflux following diet and medication usage    GERD Symptoms -acid/Sour  taste to the throat    Does not sound Cardiac   Tips to control reflux discussed     GERD-  I suggest continuation of the Proton pump inhibitor in the perioperative period . I suggest aspiration precautions    Fibromyalgia  She is doing good with the fibromyalgia and finds Lyrica helpful    Steroid-induced osteoporosis  She is on calcium and vitamin-D supplementation   She is on Prolia since 6 months  She has had longstanding steroid use but is currently down to 3 mg of prednisone once a day     She had 3 left hip fractures and multiple feet bone fractures on both sides  She has had no recent fractures     She is under endocrinology care\      Current chronic use of systemic steroids  He is on prednisone 3 mg a day for the past 6 months   I suggested continuation offered during the perioperative time     Steroid treatment- I suggest monitoring the patient for any suggestions of adrenal insufficiency in view of the recent steroid use    Given that she is taking less than 5 mg a day of prednisone she might not require stress dose steroids in preparation for surgery    Fatty liver  She does not have a history of alcohol excess   Her fatty liver is related to her weight   She has lost about 30 lb from not eating very well from her left hip area pain from heterotopic ossification    She received injection treatment for the heterotopic ossification about 3 months ago and she is doing well  and since then she is eating better and maintaining her weight    She does not feel frail    Given that she lost about 30 lb albeit be non intentionally her fatty liver would have gotten better with the weight loss    March 2022-Fibrosis Stage:  F 0-1    Her liver enzyme AST from December was mildly elevated    Suggest care with usage of Medication that are hepatotoxic or  Metabolized in the liver     She has no suggestions of cirrhosis of liver and her most recent platelet count is normal     Jan 2022  Liver: 14.1 cm, homogeneous parenchymal echotexture. No focal lesions.        Spleen: 8 cm.  Normal in size with homogeneous echotexture.     Miscellaneous: No ascites.      No history  of cirrhosis of liver or suggestions of Liver  decompensation   No Jaundice , dark urine , pale stool               Hormone replacement therapy (HRT)  She is on local estrogen and systemic estrogen and systemic progesterone    She has been on hormone replacement therapy for the last 10 - 15 years for hot flashes, mood changes    On hormone replacement therapy   Risk / benefits ofhormone replacement therpay   use in the perioperative period discussed   Risk of thrombosis discussed with hormone replacement therpay  use  Preferable to hold hormone replacement therpay 1-2 weeks pre op until  Mobility returns to base line   She choose to hold hormone replacement therapy 2 weeks before surgery    She still has an intact uterus and I have informed her that with holding hormone replacement therapy that is a risk of her having withdrawal vaginal bleeding and I suggested to discuss with her gynecologist who is not in the [facility]    Chronic pain  She has chronic pain from fibromyalgia and rheumatoid arthritis  She is on Advil, Aleve and tramadol    He takes half tablet of tramadol up to twice a day for the past 2 years       Chronic continuous opioid use- In view of the opioid use, the patient may have opioid tolerance . I  suggest considering the possibility of opioid tolerance  in planning post operative pain control     Discussed possibly reducing the opioid use in preparation for surgery , so that it reduces the opioid tolerance which helps post op pain control     Suggested letting pain management know about the up coming surgery      Edema  Recent ultrasound scan showed no deep vein thrombosis    Edema- I suggested avoidance of added salt,avoidance of NSAID's, unless advised or ordered  and suggested Limb elevation and hiral hose use    Leukopenia  Dec th 2022  I suggested the perioperative care team be aware of this so that appropriate preventative care can be undertaken    History of COVID-19 April 2021  She recovered from COVID   she is breathing good        Preventive perioperative care    Thromboembolic prophylaxis:  Her risk factors for thrombosis include surgical procedure, age, and hormone replacement therapy.I suggest  thromboembolic prophylaxis ( mechanical/pharmacological, weighing the risk benefits of pharmacological agent use considering [facility] procedural bleeding )  during the perioperative period.I suggested being active in the post operative period.      Postoperative pulmonary complication prophylaxis-\- I suggest incentive spirometry use, early ambulation, and end tidal carbon dioxide monitoring  , oral care , head end of bed elevation      Renal complication prophylaxis- . I suggest keeping her well hydrated  in the perioperative period.    Surgical site Infection Prophylaxis-I  suggest appropriate antibiotic for Prophylaxis against Surgical site infections  No reported Staph infection  Skin antibacterial discussed         This visit was focused on Preoperative evaluation, Perioperative Medical management, complication reduction plans. I suggest that the patient follows up with primary care or relevant sub specialists for ongoing health care.    I appreciate the opportunity to be involved in this patients care.  Please feel free to contact me if there were any questions about this consultation.    Patient is optimized    Patient/ care giver/ Family member was instructed to call and update me about any changes to health,  medication, office visits ,testing out side of the [facility] , hospitalizations between now and surgery      Radha Chauhan MD  Internal Medicine  [facility]   Cell Phone- [phone]    History of COVID - Yes  COVID vaccination status -3    COVID screening     No fever   No cough   No SOB  No sore throat   No loss of taste or smell   No muscle aches   No nausea, vomiting , diarrhea     She has no problems with small objects like keys coins or buttons or dropping things  Apart from the left foot she has no balance problem    Checked for over-the-counter medication  --  12/29/2022- 17 47     Cervical spine x ray reportedly showed   Odontoid prevertebral soft tissues and posterior elements are intact.  No instability seen.  No fracture dislocation bone destruction seen.  No trauma seen.  No acute process seen.  No acute process seen  --  1/3/2022- 9 40     Corresponded with the Rheumatologist -   With regards to steroid use - hydrocortisone  , if needed  ( for suggestions of adrenal insufficiency )  -  1/10/2023- 8 19     Called to follow up , spoke to her to address any concerns with the up coming surgery or any questions on Medication instructions -  Doing over all well   Had Bronchitis since she saw me   No wheezing , fever - now  Went to - Tested negative for Flu, RSV,COVID-Has received Antibiotic Levaquin- Finished Yesterday -- mild cough - back to Normal    -  1/10/2023- 17 16     Spoke to her   No other Medication changes

## 2022-12-30 ENCOUNTER — PATIENT MESSAGE (OUTPATIENT)
Dept: SURGERY | Facility: HOSPITAL | Age: 62
End: 2022-12-30
Payer: MEDICARE

## 2022-12-30 ENCOUNTER — TELEPHONE (OUTPATIENT)
Dept: FAMILY MEDICINE | Facility: CLINIC | Age: 62
End: 2022-12-30
Payer: MEDICARE

## 2022-12-30 NOTE — TELEPHONE ENCOUNTER
----- Message from Latesha Gomes sent at 12/30/2022  7:50 AM CST -----  Regarding: call back  Contact: [phone]  Type:  Same Day Appointment Request    Caller is requesting a same day appointment.  Caller declined first available appointment listed below.    Name of Caller: PT   When is the first available appointment? February   Symptoms: Sinus infection and upper raspatory   Best Call Back Number: [phone]  Additional Information:

## 2022-12-30 NOTE — TELEPHONE ENCOUNTER
Called and spoke with pt in regards of her message. Pt was calling to get an appt today to be seen for cough and congestion. Informed pt that Dr. Singleton is out of the office today, and all other providers here in the clinic are full for today. Advised pt that she can either go to an [facility] clinic to seek treatment, pt verbalized. Pt also made an appt on 1/3/2023 to see TARA Wesley if she is still not feeling well.

## 2023-01-02 ENCOUNTER — PATIENT MESSAGE (OUTPATIENT)
Dept: FAMILY MEDICINE | Facility: CLINIC | Age: 63
End: 2023-01-02
Payer: MEDICARE

## 2023-01-03 ENCOUNTER — PATIENT MESSAGE (OUTPATIENT)
Dept: GASTROENTEROLOGY | Facility: CLINIC | Age: 63
End: 2023-01-03
Payer: MEDICARE

## 2023-01-03 ENCOUNTER — PATIENT MESSAGE (OUTPATIENT)
Dept: RHEUMATOLOGY | Facility: CLINIC | Age: 63
End: 2023-01-03
Payer: MEDICARE

## 2023-01-04 ENCOUNTER — INITIAL CONSULT (OUTPATIENT)
Dept: VASCULAR SURGERY | Facility: CLINIC | Age: 63
End: 2023-01-04
Payer: MEDICARE

## 2023-01-04 ENCOUNTER — SPECIALTY PHARMACY (OUTPATIENT)
Dept: PHARMACY | Facility: CLINIC | Age: 63
End: 2023-01-04
Payer: MEDICARE

## 2023-01-04 VITALS
BODY MASS INDEX: 26.76 KG/M2 | SYSTOLIC BLOOD PRESSURE: 113 MMHG | DIASTOLIC BLOOD PRESSURE: 76 MMHG | WEIGHT: 141.63 LBS

## 2023-01-04 DIAGNOSIS — M79.89 LEFT LEG SWELLING: ICD-10-CM

## 2023-01-04 DIAGNOSIS — M05.79 RHEUMATOID ARTHRITIS INVOLVING MULTIPLE SITES WITH POSITIVE RHEUMATOID FACTOR: Primary | Chronic | ICD-10-CM

## 2023-01-04 DIAGNOSIS — M06.9 RHEUMATOID ARTHRITIS, INVOLVING UNSPECIFIED SITE, UNSPECIFIED WHETHER RHEUMATOID FACTOR PRESENT: ICD-10-CM

## 2023-01-04 DIAGNOSIS — I87.302 VENOUS HYPERTENSION OF LEFT LOWER EXTREMITY: Primary | ICD-10-CM

## 2023-01-04 PROCEDURE — 99203 OFFICE O/P NEW LOW 30 MIN: CPT | Mod: S$GLB,,, | Performed by: SURGERY

## 2023-01-04 PROCEDURE — 99999 PR PBB SHADOW E&M-EST. PATIENT-LVL IV: ICD-10-PCS | Mod: PBBFAC,,, | Performed by: SURGERY

## 2023-01-04 PROCEDURE — 99203 PR OFFICE/OUTPT VISIT, NEW, LEVL III, 30-44 MIN: ICD-10-PCS | Mod: S$GLB,,, | Performed by: SURGERY

## 2023-01-04 PROCEDURE — 99999 PR PBB SHADOW E&M-EST. PATIENT-LVL IV: CPT | Mod: PBBFAC,,, | Performed by: SURGERY

## 2023-01-04 RX ORDER — SARILUMAB 200 MG/1.14ML
200 INJECTION, SOLUTION SUBCUTANEOUS
Qty: 2.28 ML | Refills: 2 | Status: SHIPPED | OUTPATIENT
Start: 2023-01-04 | End: 2023-01-31 | Stop reason: SDUPTHER

## 2023-01-04 RX ORDER — SARILUMAB 200 MG/1.14ML
200 INJECTION, SOLUTION SUBCUTANEOUS
Qty: 2.28 ML | Refills: 2 | OUTPATIENT
Start: 2023-01-04 | End: 2023-01-04 | Stop reason: SDUPTHER

## 2023-01-04 NOTE — PROGRESS NOTES
Cristiano Randall MD, RPVI                                 [facility]                           [facility]                             01/04/2023    HPI:  Joyce Peterson is a 62 y.o. female with   Patient Active Problem List   Diagnosis    Osteoarthritis involving multiple joints on both sides of body    GERD (gastroesophageal reflux disease)    Gastroparesis    Steroid-induced osteoporosis    Thyroid nodule    Hyperlipidemia    Mild intermittent asthma without complication    Rheumatoid arthritis involving multiple sites    Low back pain    Iron deficiency anemia due to chronic blood loss    Research study patient    Sjogren's syndrome    Candidal esophagitis    Coronary artery disease involving native coronary artery of native heart without angina pectoris    Subclinical hyperthyroidism    Fibromyalgia    Dry eyes    Ureterocele    Urge urinary incontinence    Elevated serum GGT level    Transaminasemia    Lumbar radiculopathy    Stress fracture of left foot    Arthritis of foot    Pes planovalgus, acquired, left    Degenerative joint disease of hindfoot, left    Decreased strength, endurance, and mobility    Impaired tolerance of activity    Current chronic use of systemic steroids    Fatty liver    Hormone replacement therapy (HRT)    Chronic pain    Edema    Leukopenia    History of COVID-19    being managed by PCP and specialists who is here today for evaluation of LLE edema.  Patient states location is LLE occurring for mo  Associated signs and symptoms include edema.  Quality is heavy and severity is 3/10.  Symptoms began 6 mo ago.  Alleviating factors include elevation.  Worsening factors include dependency.  Patient has not been wearing compression stockings for greater than 3 months.  No FH of venous disease.  Symptoms do not limit patient's functional status and daily activities.  no DVT history.  no venous interventions.  no low sodium diet.  + excessive water intake  due to medical condition.    Migraine with aura: no  PFO/ASD/right to left shunt: no  Pregnant: no  Breastfeeding: no    no MI  no Stroke  Tobacco use: denies    Past Medical History:   Diagnosis Date    Acid reflux     Allergy     Anemia     Asthma     Coronary artery disease     CS (cervical spondylosis) 03/08/2013    Degenerative disc disease     Dry eyes     Dry mouth     Gastroparesis     History of methotrexate therapy 01/19/2022    Hyperlipidemia     Lateral meniscus derangement 04/06/2016    Lobular carcinoma in situ     Lumbar spondylosis 03/08/2013    Osteoarthritis     Osteoporosis     Rheumatoid arthritis(714.0)     Rupture of left triceps tendon 10/17/2018    Umbilical hernia 08/13/2015     Past Surgical History:   Procedure Laterality Date    BREAST BIOPSY Left 01/29/2002    core bx    CARPAL TUNNEL RELEASE Right 05/2017    CHOLECYSTECTOMY  2004    COLONOSCOPY      10/11    COLONOSCOPY N/A 6/29/2017    Procedure: COLONOSCOPY;  Surgeon: López Moore MD;  Location: [facility] ENDO ([address] [place] [address]);  Service: Endoscopy;  Laterality: N/A;    EPIDURAL STEROID INJECTION N/A 11/18/2021    Procedure: INJECTION, STEROID, EPIDURAL, L5-S1 IL NEED CONSENT;  Surgeon: Tj Mayfield MD;  Location: [facility] PAIN MGT;  Service: Pain Management;  Laterality: N/A;    EPIDURAL STEROID INJECTION N/A 9/29/2022    Procedure: LUMBAR L3/L4 IL MADELINE CONTRAST;  Surgeon: Tj Mayfield MD;  Location: [facility] PAIN MGT;  Service: Pain Management;  Laterality: N/A;    EPIDURAL STEROID INJECTION N/A 12/12/2022    Procedure: INJECTION, STEROID, EPIDURAL L5/S1 IL CONTRAST;  Surgeon: Tj Mayfield MD;  Location: [facility] PAIN MGT;  Service: Pain Management;  Laterality: N/A;    INJECTION OF ANESTHETIC AGENT AROUND NERVE Bilateral 8/23/2021    Procedure: BLOCK, NERVE, MEDIAL BRANCH L3,L4,L5;  Surgeon: Tj Mayfield MD;  Location: [facility] PAIN MGT;  Service: Pain Management;  Laterality: Bilateral;  1 of 2    INJECTION OF ANESTHETIC AGENT AROUND NERVE Bilateral  8/26/2021    Procedure: BLOCK, NERVE, MEDIAL BRANCH L3,L4,L5;  Surgeon: Tj Mayfield MD;  Location: [facility] PAIN MGT;  Service: Pain Management;  Laterality: Bilateral;  2 of 2    INJECTION OF ANESTHETIC AGENT AROUND NERVE Left 7/7/2022    Procedure: BLOCK, NERVE, LEFT OBTURATOR AND FEMORAL;  Surgeon: Tj Mayfield MD;  Location: [facility] PAIN MGT;  Service: Pain Management;  Laterality: Left;    INJECTION OF FACET JOINT Bilateral 3/12/2020    Procedure: FACET JOINT INJECTION (LUMBAR BLOCK) BILATERAL L4-5 AND L5-S1 DIRECT REFERRAL;  Surgeon: Tj Mayfield MD;  Location: [facility]IN [facility]GT;  Service: Pain Management;  Laterality: Bilateral;  NEEDS CONSENT    INJECTION OF FACET JOINT Bilateral 3/29/2021    Procedure: INJECTION, FACET JOINT L3/4, L4/5, L5/S1;  Surgeon: Tj Mayfield MD;  Location: [facility] PAIN MGT;  Service: Pain Management;  Laterality: Bilateral;    INJECTION OF JOINT Right 7/30/2020    Procedure: INJECTION, JOINT, RIGHT HIP Interarticular under flouro;  Surgeon: Tj Mayfield MD;  Location: [facility]GT;  Service: Pain Management;  Laterality: Right;  INJECTION, JOINT, RIGHT HIP Interarticular under flouro    INJECTION OF JOINT Right 2/21/2022    Procedure: Injection, Joint RIGHT ISCHIAL BURSA;  Surgeon: Tj Mayfield MD;  Location: [facility]GT;  Service: Pain Management;  Laterality: Right;    INTRAMEDULLARY RODDING OF FEMUR Left 5/21/2019    Procedure: INSERTION, INTRAMEDULLARY ARIEL, FEMUR;  Surgeon: López Duff MD;  Location: [facility] OR 2ND FLR;  Service: Orthopedics;  Laterality: Left;    RADIOFREQUENCY ABLATION Left 9/20/2021    Procedure: RADIOFREQUENCY ABLATION left L3,4,5 RFA  1 of 2  CONSENT NEEDED;  Surgeon: Tj Mayfield MD;  Location: [facility];  Service: Pain Management;  Laterality: Left;    RADIOFREQUENCY ABLATION Right 10/4/2021    Procedure: RADIOFREQUENCY ABLATION  right L3,4,5 RFA   2 of 2  CONSENT NEEDED;  Surgeon: Tj Mayfield MD;  Location: [facility];  Service: Pain Management;   Laterality: Right;    RADIOFREQUENCY ABLATION Left 4/21/2022    Procedure: Radiofrequency Ablation LEFT L3,L4,L5;  Surgeon: Tj Mayfield MD;  Location: [facility] PAIN [facility]GT;  Service: Pain Management;  Laterality: Left;    RADIOFREQUENCY ABLATION Right 5/12/2022    Procedure: Radiofrequency Ablation RIGHT L3,L4,L5;  Surgeon: Tj Mayfield MD;  Location: [facility] PAIN [facility]GT;  Service: Pain Management;  Laterality: Right;    RADIOFREQUENCY ABLATION Left 7/25/2022    Procedure: Radiofrequency Ablation Left Femoral & Obturator;  Surgeon: Tj Mayfield MD;  Location: [facility] PAIN [facility]GT;  Service: Pain Management;  Laterality: Left;    REPAIR OF TRICEPS TENDON Left 10/17/2018    Procedure: REPAIR, TENDON, TRICEPS left elbow;  Surgeon: Staci Yarbrough MD;  Location: [facility] OR [address] [place] [address];  Service: Orthopedics;  Laterality: Left;  Anesthesia: General and regional. PRONE, k-wire , hand pan 1 and pan 2, CALL ARTHREX/Tamera notified 10-12 LO    TONSILLECTOMY      TRANSFORAMINAL EPIDURAL INJECTION OF STEROID Right 8/31/2020    Procedure: INJECTION, STEROID, EPIDURAL, TRANSFORAMINAL,  APPROACH, L3-L4 and L4-L5 need consent;  Surgeon: Tj Mayfield MD;  Location: [facility];  Service: Pain Management;  Laterality: Right;    TRANSFORAMINAL EPIDURAL INJECTION OF STEROID Bilateral 7/23/2021    Procedure: INJECTION, STEROID, EPIDURAL, TRANSFORAMINAL APPROACH L4/5;  Surgeon: Tj Mayfield MD;  Location: [facility] PAIN MGT;  Service: Pain Management;  Laterality: Bilateral;    TRIGGER POINT INJECTION N/A 7/23/2021    Procedure: INJECTION, TRIGGER POINT SCAPULAR;  Surgeon: Tj Mayfield MD;  Location: [facility] PAIN MGT;  Service: Pain Management;  Laterality: N/A;    TUBAL LIGATION  2003    UPPER GASTROINTESTINAL ENDOSCOPY      10/11    uterine ablation  2003     Family History   Problem Relation Age of Onset    Cancer Mother         Lung Cancer    Emphysema Mother     Heart attack Mother     Alcohol abuse Mother     Cancer Father         Lung Cancer     Osteoarthritis Father     Lung cancer Father     Skin cancer Father     Alcohol abuse Father     Heart disease Brother     Heart attack Brother     Alcohol abuse Brother     Cirrhosis Brother     Osteoarthritis Paternal Aunt     Retinal detachment Maternal Aunt     No Known Problems Sister     No Known Problems Maternal Uncle     No Known Problems Paternal Uncle     No Known Problems Maternal Grandmother     No Known Problems Maternal Grandfather     No Known Problems Paternal Grandmother     No Known Problems Paternal Grandfather     Colon cancer Neg Hx     Esophageal cancer Neg Hx     Stomach cancer Neg Hx     Celiac disease Neg Hx     Diabetes Neg Hx     Thyroid disease Neg Hx     Amblyopia Neg Hx     Blindness Neg Hx     Cataracts Neg Hx     Glaucoma Neg Hx     Hypertension Neg Hx     Macular degeneration Neg Hx     Strabismus Neg Hx     Stroke Neg Hx      Social History     Socioeconomic History    Marital status:    Tobacco Use    Smoking status: Never    Smokeless tobacco: Never   Substance and Sexual Activity    Alcohol use: Yes     Comment: 2-3 times per year.    Drug use: No    Sexual activity: Yes     Partners: Male     Birth control/protection: Surgical     Social Determinants of Health     Financial Resource Strain: Low Risk     Difficulty of Paying Living Expenses: Not hard at all   Food Insecurity: Unknown    Worried About Running Out of Food in the Last Year: Patient refused    Ran Out of Food in the Last Year: Patient refused   Transportation Needs: Unknown    Lack of Transportation (Medical): Patient refused    Lack of Transportation (Non-Medical): Patient refused   Physical Activity: Unknown    Days of Exercise per Week: 2 days   Stress: No Stress Concern Present    Feeling of Stress : Not at all   Social Connections: Unknown    Frequency of Communication with Friends and Family: Twice a week    Frequency of Social Gatherings with Friends and Family: Twice a week    Active Member of Clubs  or Organizations: Yes    Attends Club or Organization Meetings: Patient refused    Marital Status:    Housing Stability: Low Risk     Unable to Pay for Housing in the Last Year: No    Number of Places Lived in the Last Year: 1    Unstable Housing in the Last Year: No       Current Outpatient Medications:     albuterol (PROVENTIL/VENTOLIN HFA) 90 mcg/actuation inhaler, INHALE 2 PUFFS INTO THE LUNGS EVERY 6 (SIX) HOURS AS NEEDED. RESCUE, Disp: 20.1 g, Rfl: 11    albuterol-ipratropium (DUO-NEB) 2.5 mg-0.5 mg/3 mL nebulizer solution, Take 3 mLs by nebulization every 6 (six) hours as needed for Wheezing. Rescue, Disp: 90 mL, Rfl: 3    ALPRAZolam (XANAX) 0.5 MG tablet, Take Once on call to procedure, Disp: 1 tablet, Rfl: 0    aspirin 81 mg Tab, Take 81 mg by mouth every morning. , Disp: , Rfl:     budesonide-formoterol 160-4.5 mcg (SYMBICORT) 160-4.5 mcg/actuation HFAA, INHALE 2 PUFFS INTO THE LUNGS EVERY 12 (TWELVE) HOURS., Disp: 30.6 g, Rfl: 3    calcium citrate-vitamin D3 315-200 mg (CITRACAL+D) 315 mg-5 mcg (200 unit) per tablet, Take 1 tablet by mouth 2 (two) times daily. , Disp: , Rfl:     clindamycin (CLEOCIN) 300 MG capsule, Take 2 capsules by mouth now, then take 1 capsule 4 times daily until finished, Disp: 28 capsule, Rfl: 7    cycloSPORINE (RESTASIS) 0.05 % ophthalmic emulsion, Instill one drop into both eyes two times per day, Disp: 180 each, Rfl: 3    diazePAM (VALIUM) 10 MG Tab, Take 1 tablet (10 mg total) by mouth once as needed., Disp: 1 tablet, Rfl: 0    diclofenac sodium (VOLTAREN) 1 % Gel, APPLY 2 GRAMS TOPICALLY 4 (FOUR) TIMES DAILY AS NEEDED. Strength: 1 %, Disp: 300 g, Rfl: 2    estrogens, conjugated, (PREMARIN) 0.625 MG tablet, take 1 tablet by mouth daily, Disp: 90 tablet, Rfl: 4    fluconazole (DIFLUCAN) 100 MG tablet, Take 1 tablet by mouth once daily for 14 days. (Patient not taking: Reported on 12/29/2022), Disp: 14 tablet, Rfl: 0    halobetasol propion-tazarotene (DUOBRII) 0.01-0.045 %  Lotn, aaa on feet and hands qhs  then vaseline and warm towel (Patient taking differently: aaa on feet and hands qhs  then vaseline and warm towel), Disp: 100 g, Rfl: 3    ibuprofen (ADVIL,MOTRIN) 800 MG tablet, Take 1 tablet (800 mg total) by mouth every 6 to 8 hours as needed for pain., Disp: 30 tablet, Rfl: 0    INV PROPULSID 10 MG, Take 2 tablets (20 mg) by mouth 4 (four) times daily. FOR INVESTIGATIONAL USE ONLY. Protocol [id number]-154 Subject ID: [id number]., Disp: 1440 each, Rfl: 0    ketoconazole (NIZORAL) 2 % cream, Apply to feet twice daily for 3 weeks (Patient taking differently: Apply to feet twice daily for 3 weeks), Disp: 60 g, Rfl: 3    leflunomide (ARAVA) 20 MG Tab, TAKE 1 TABLET BY MOUTH EVERY DAY, Disp: 90 tablet, Rfl: 0    lifitegrast (XIIDRA) 5 % Dpet, INSTILL ONE DROP INTO BOTH EYES TWICE A DAY, Disp: 60 mL, Rfl: 10    magic mouthwash diphen/antac/lidoc, Swish and Spit 5 mL by mouth for 30 seconds twice daily. (Patient not taking: Reported on 12/29/2022), Disp: 150 mL, Rfl: 0    methenamine (HIPREX) 1 gram Tab, Take 1 tablet (1 g total) by mouth 2 (two) times daily., Disp: 180 tablet, Rfl: 3    methocarbamoL (ROBAXIN) 750 MG Tab, Take 1 tablet (750 mg total) by mouth 3 (three) times daily. for 14 days, Disp: 42 tablet, Rfl: 0    methylPREDNISolone (MEDROL DOSEPACK) 4 mg tablet, use as directed on package (Patient not taking: Reported on 12/29/2022), Disp: 21 tablet, Rfl: 0    mirabegron (MYRBETRIQ) 25 mg Tb24 ER tablet, Take 1 tablet (25 mg total) by mouth once daily., Disp: 90 tablet, Rfl: 3    montelukast (SINGULAIR) 10 mg tablet, TAKE 1 TABLET BY MOUTH EVERY DAY AT NIGHT, Disp: 90 tablet, Rfl: 3    naproxen (NAPROSYN) 500 MG tablet, Take 1 tablet (500 mg total) by mouth 2 (two) times daily as needed., Disp: 180 tablet, Rfl: 0    omeprazole (PRILOSEC) 40 MG capsule, Take 1 capsule (40 mg total) by mouth every morning., Disp: 30 capsule, Rfl: 6    omeprazole-sodium bicarbonate (ZEGERID) 40-1.1  mg-gram per capsule, TAKE 1 CAPSULE BY MOUTH EVERY DAY IN THE MORNING, Disp: 90 capsule, Rfl: 3    oxyCODONE (ROXICODONE) 5 MG immediate release tablet, Take 1 tablet (5 mg total) by mouth every 4 (four) hours as needed for Pain., Disp: 30 tablet, Rfl: 0    POTASSIUM ORAL, Take by mouth once daily., Disp: , Rfl:     predniSONE (DELTASONE) 1 MG tablet, Take 5 tablets (5 mg total) by mouth once daily., Disp: 450 tablet, Rfl: 1    predniSONE (DELTASONE) 5 MG tablet, TAKE 1 TABLET BY MOUTH EVERY DAY, Disp: 90 tablet, Rfl: 1    pregabalin (LYRICA) 50 MG capsule, Take 3 capsules (150 mg total) by mouth every evening. (Patient taking differently: Take 100 mg by mouth every evening.), Disp: 270 capsule, Rfl: 1    PREMARIN vaginal cream, PLACE 0.5 GRAM VAGINALLY 3 (THREE) TIMES A WEEK., Disp: 30 g, Rfl: 1    progesterone (PROMETRIUM) 100 MG capsule, Take 1 capsule by mouth daily., Disp: 90 capsule, Rfl: 1    progesterone (PROMETRIUM) 100 MG capsule, take 1 capsule by mouth daily, Disp: 90 capsule, Rfl: 4    promethazine (PHENERGAN) 25 MG tablet, Take 1 tablet (25 mg total) by mouth every 6 (six) hours as needed for Nausea., Disp: 30 tablet, Rfl: 1    promethazine (PHENERGAN) 25 MG tablet, Take 1 tablet (25 mg total) by mouth every 6 (six) hours as needed for Nausea., Disp: 15 tablet, Rfl: 0    rosuvastatin (CRESTOR) 20 MG tablet, Take 1 tablet (20 mg total) by mouth every evening., Disp: 90 tablet, Rfl: 3    sarilumab (KEVZARA) 200 mg/1.14 mL Syrg, Inject 1.14 mL (200 mg total) into the skin every 14 (fourteen) days., Disp: 2.28 mL, Rfl: 2    sucralfate (CARAFATE) 1 gram tablet, TAKE 1 TABLET (1 G TOTAL) BY MOUTH 4 (FOUR) TIMES DAILY., Disp: 360 tablet, Rfl: 2    sucralfate (CARAFATE) 1 gram tablet, Take 1 tablet (1 g total) by mouth 4 (four) times daily., Disp: 360 tablet, Rfl: 3    traMADoL (ULTRAM) 50 mg tablet, TAKE 1 TABLET BY MOUTH EVERY 12 HOURS AS NEEDED FOR PAIN., Disp: 60 tablet, Rfl: 1    REVIEW OF  SYSTEMS:  General: No fevers or chills; ENT: No sore throat; Allergy and Immunology: no persistent infections; Hematological and Lymphatic: No history of bleeding or easy bruising; Endocrine: negative; Respiratory: no cough, shortness of breath, or wheezing; Cardiovascular: no chest pain or dyspnea on exertion; Gastrointestinal: no abdominal pain/back, change in bowel habits, or bloody stools; Genito-Urinary: no dysuria, trouble voiding, or hematuria; Musculoskeletal: edema; Neurological: no TIA or stroke symptoms; Psychiatric: no nervousness, anxiety or depression.    PHYSICAL EXAM:                General appearance:  Alert, well-appearing, and in no distress.  Oriented to person, place, and time                    Neurological: Normal speech, no focal findings noted; CN II - XII grossly intact. RLE with sensation to light touch, LLE with sensation to light touch.            Musculoskeletal: Digits/nail without cyanosis/clubbing.  Strength 5/5 BLE.                    Neck: Supple, no significant adenopathy                  Chest:  No wheezes, symmetric air entry. No use of accessory muscles               Cardiac: Normal rate and regular rhythm            Abdomen: Soft, nontender, nondistended      Extremities:   1+ R DP pulse, 1+ L DP pulse      1+ RLE edema, 1+ LLE edema    Skin:  RLE no ulcer; LLE no ulcer      RLE no spider veins, LLE no spider veins      RLE no varicose veins, LLE no varicose veins    CEAP 3/3    VCSS 4    LAB RESULTS:  No results found for: CBC  Lab Results   Component Value Date    LABPROT 10.1 12/16/2015    INR 1.0 12/16/2015     Lab Results   Component Value Date     12/09/2022    K 3.6 12/09/2022     12/09/2022    CO2 28 12/09/2022     12/09/2022    BUN 14 12/09/2022    CREATININE 0.67 12/09/2022    CALCIUM 8.2 (L) 12/09/2022    ANIONGAP 4 (L) 12/09/2022    EGFRNONAA >60.0 06/20/2022     Lab Results   Component Value Date    WBC 3.43 (L) 12/09/2022    RBC 4.24 12/09/2022     HGB 12.7 12/09/2022    HCT 38.4 12/09/2022    MCV 91 12/09/2022    MCH 30.0 12/09/2022    MCHC 33.1 12/09/2022    RDW 13.5 12/09/2022     12/09/2022    MPV 11.1 12/09/2022    GRAN 1.6 (L) 12/09/2022    GRAN 45.4 12/09/2022    LYMPH 1.1 12/09/2022    LYMPH 33.2 12/09/2022    MONO 0.6 12/09/2022    MONO 18.7 (H) 12/09/2022    EOS 0.0 12/09/2022    BASO 0.05 12/09/2022    EOSINOPHIL 0.9 12/09/2022    BASOPHIL 1.5 12/09/2022    DIFFMETHOD Automated 12/09/2022     .  Lab Results   Component Value Date    HGBA1C 5.2 03/15/2022       IMAGING:  All pertinent imaging has been reviewed and interpreted independently.    Venous US 12/2022 Impression:  Color flow evaluation of the right lower extremity demonstrates no evidence of venous thrombosis in the deep or superficial veins, and no reflux.  Color flow evaluation of the left lower extremity demonstrates no evidence of venous thrombosis in the deep or superficial veins, and no reflux.      IMP/PLAN:  62 y.o. female with   Patient Active Problem List   Diagnosis    Osteoarthritis involving multiple joints on both sides of body    GERD (gastroesophageal reflux disease)    Gastroparesis    Steroid-induced osteoporosis    Thyroid nodule    Hyperlipidemia    Mild intermittent asthma without complication    Rheumatoid arthritis involving multiple sites    Low back pain    Iron deficiency anemia due to chronic blood loss    Research study patient    Sjogren's syndrome    Candidal esophagitis    Coronary artery disease involving native coronary artery of native heart without angina pectoris    Subclinical hyperthyroidism    Fibromyalgia    Dry eyes    Ureterocele    Urge urinary incontinence    Elevated serum GGT level    Transaminasemia    Lumbar radiculopathy    Stress fracture of left foot    Arthritis of foot    Pes planovalgus, acquired, left    Degenerative joint disease of hindfoot, left    Decreased strength, endurance, and mobility    Impaired tolerance of  activity    Current chronic use of systemic steroids    Fatty liver    Hormone replacement therapy (HRT)    Chronic pain    Edema    Leukopenia    History of COVID-19    being managed by PCP and specialists who is here today for evaluation of LLE edema.    -recommend compression with Rx stockings, elevation, dietary changes associated with water and sodium intake discussed at length with patient  -Exercise   -Negative venous insufficiency - no vascular surgical intervention indicated preop  -RTC prn    I spent 11 minutes evaluating this patient and greater than 50% of the time was spent counseling, coordinator care and discussing the plan of care.  All questions were answered and patient stated understanding with agreement with the above treatment plan.    Cristiano Randall MD [facility]  Vascular and Endovascular Surgery

## 2023-01-05 NOTE — TELEPHONE ENCOUNTER
Received refill approval. Pt has new insurance for . Test claim pays $80.00. New BI for . Pt due to inject on .      BI: Complete [employer]    RX [employer] rx Medicare     Copay: $80.00. Pt will be in the initial benefits of Medicare Part D. No LIS.   [facility] is in network   PA approved until 23    Outgoing call to pt to see if $80.00 was affordable or if she would like to apply for PAP. Pt said the $80.00 is fine that she would be over the income limit.     Pt just left today to go out of town for a . Pt due to inject on . 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 Pt will inject on Tuesday and continue with same injection schedule.

## 2023-01-05 NOTE — TELEPHONE ENCOUNTER
Specialty Pharmacy - Refill Coordination  Specialty Pharmacy - Medication/Referral Authorization    Specialty Medication Orders Linked to Encounter      Flowsheet Row Most Recent Value   Medication #1 sarilumab (KEVZARA) 200 mg/1.14 mL Syrg (Order#[id number], Rx#)        Pt just left today to go out of town for a . Pt due to inject on . 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 Pt will inject on Tuesday and continue with same injection schedule.     Refill Questions - Documented Responses      Flowsheet Row Most Recent Value   Refill Screening Questions    Changes to allergies? No   Changes to medications? No   New conditions since last clinic visit? No   How does patient/caregiver feel medication is working? Good   Financial problems or insurance changes? No   How many doses of your specialty medications were missed in the last 4 weeks? 0   Would patient like to speak to a pharmacist? No   When does the patient need to receive the medication? 23   Refill Delivery Questions    How will the patient receive the medication? [facility]   When does the patient need to receive the medication? 23   Shipping Address Home   Address in [facility] confirmed and updated if neccessary? Yes   Expected Copay ($) 80   Is the patient able to afford the medication copay? Yes   Payment Method CC on file   Days supply of Refill 28   Supplies needed? No supplies needed   Refill activity completed? Yes   Refill activity plan Refill scheduled   Shipment/Pickup Date: 23            Current Outpatient Medications   Medication Sig    albuterol (PROVENTIL/VENTOLIN HFA) 90 mcg/actuation inhaler INHALE 2 PUFFS INTO THE LUNGS EVERY 6 (SIX) HOURS AS NEEDED. RESCUE    albuterol-ipratropium (DUO-NEB) 2.5 mg-0.5 mg/3 mL nebulizer solution Take 3 mLs by nebulization every 6 (six) hours as needed for Wheezing. Rescue    ALPRAZolam (XANAX) 0.5 MG tablet Take Once on call to procedure    aspirin 81 mg Tab Take 81 mg by  mouth every morning.     budesonide-formoterol 160-4.5 mcg (SYMBICORT) 160-4.5 mcg/actuation HFAA INHALE 2 PUFFS INTO THE LUNGS EVERY 12 (TWELVE) HOURS.    calcium citrate-vitamin D3 315-200 mg (CITRACAL+D) 315 mg-5 mcg (200 unit) per tablet Take 1 tablet by mouth 2 (two) times daily.     clindamycin (CLEOCIN) 300 MG capsule Take 2 capsules by mouth now, then take 1 capsule 4 times daily until finished    cycloSPORINE (RESTASIS) 0.05 % ophthalmic emulsion Instill one drop into both eyes two times per day    diazePAM (VALIUM) 10 MG Tab Take 1 tablet (10 mg total) by mouth once as needed.    diclofenac sodium (VOLTAREN) 1 % Gel APPLY 2 GRAMS TOPICALLY 4 (FOUR) TIMES DAILY AS NEEDED.  Strength: 1 %    estrogens, conjugated, (PREMARIN) 0.625 MG tablet take 1 tablet by mouth daily    fluconazole (DIFLUCAN) 100 MG tablet Take 1 tablet by mouth once daily for 14 days. (Patient not taking: Reported on 12/29/2022)    halobetasol propion-tazarotene (DUOBRII) 0.01-0.045 % Lotn aaa on feet and hands qhs  then vaseline and warm towel (Patient taking differently: aaa on feet and hands qhs  then vaseline and warm towel)    ibuprofen (ADVIL,MOTRIN) 800 MG tablet Take 1 tablet (800 mg total) by mouth every 6 to 8 hours as needed for pain.    INV PROPULSID 10 MG Take 2 tablets (20 mg) by mouth 4 (four) times daily. FOR INVESTIGATIONAL USE ONLY. Protocol [id number]  Subject ID: [id number].    ketoconazole (NIZORAL) 2 % cream Apply to feet twice daily for 3 weeks (Patient taking differently: Apply to feet twice daily for 3 weeks)    leflunomide (ARAVA) 20 MG Tab TAKE 1 TABLET BY MOUTH EVERY DAY    lifitegrast (XIIDRA) 5 % Dpet INSTILL ONE DROP INTO BOTH EYES TWICE A DAY    magic mouthwash diphen/antac/lidoc Swish and Spit 5 mL by mouth for 30 seconds twice daily. (Patient not taking: Reported on 12/29/2022)    methenamine (HIPREX) 1 gram Tab Take 1 tablet (1 g total) by mouth 2 (two) times daily.    methocarbamoL (ROBAXIN) 750 MG Tab Take  1 tablet (750 mg total) by mouth 3 (three) times daily. for 14 days    methylPREDNISolone (MEDROL DOSEPACK) 4 mg tablet use as directed on package (Patient not taking: Reported on 12/29/2022)    mirabegron (MYRBETRIQ) 25 mg Tb24 ER tablet Take 1 tablet (25 mg total) by mouth once daily.    montelukast (SINGULAIR) 10 mg tablet TAKE 1 TABLET BY MOUTH EVERY DAY AT NIGHT    naproxen (NAPROSYN) 500 MG tablet Take 1 tablet (500 mg total) by mouth 2 (two) times daily as needed.    omeprazole (PRILOSEC) 40 MG capsule Take 1 capsule (40 mg total) by mouth every morning.    omeprazole-sodium bicarbonate (ZEGERID) 40-1.1 mg-gram per capsule TAKE 1 CAPSULE BY MOUTH EVERY DAY IN THE MORNING    oxyCODONE (ROXICODONE) 5 MG immediate release tablet Take 1 tablet (5 mg total) by mouth every 4 (four) hours as needed for Pain.    POTASSIUM ORAL Take by mouth once daily.    predniSONE (DELTASONE) 1 MG tablet Take 5 tablets (5 mg total) by mouth once daily.    predniSONE (DELTASONE) 5 MG tablet TAKE 1 TABLET BY MOUTH EVERY DAY    pregabalin (LYRICA) 50 MG capsule Take 3 capsules (150 mg total) by mouth every evening. (Patient taking differently: Take 100 mg by mouth every evening.)    PREMARIN vaginal cream PLACE 0.5 GRAM VAGINALLY 3 (THREE) TIMES A WEEK.    progesterone (PROMETRIUM) 100 MG capsule Take 1 capsule by mouth daily.    progesterone (PROMETRIUM) 100 MG capsule take 1 capsule by mouth daily    promethazine (PHENERGAN) 25 MG tablet Take 1 tablet (25 mg total) by mouth every 6 (six) hours as needed for Nausea.    promethazine (PHENERGAN) 25 MG tablet Take 1 tablet (25 mg total) by mouth every 6 (six) hours as needed for Nausea.    rosuvastatin (CRESTOR) 20 MG tablet Take 1 tablet (20 mg total) by mouth every evening.    sarilumab (KEVZARA) 200 mg/1.14 mL Syrg Inject 1.14 mL (200 mg total) into the skin every 14 (fourteen) days.    sucralfate (CARAFATE) 1 gram tablet TAKE 1 TABLET (1 G TOTAL) BY MOUTH 4 (FOUR) TIMES DAILY.     "sucralfate (CARAFATE) 1 gram tablet Take 1 tablet (1 g total) by mouth 4 (four) times daily.    traMADoL (ULTRAM) 50 mg tablet TAKE 1 TABLET BY MOUTH EVERY 12 HOURS AS NEEDED FOR PAIN.   Last reviewed on 1/4/2023  9:23 AM by Janae Frank MA    Review of patient's allergies indicates:   Allergen Reactions    Actemra [tocilizumab] Other (See Comments)     Severe dizziness    Codeine Nausea And Vomiting and Hives    Gold au 198 Hives and Rash    Hydroxychloroquine Other (See Comments)     Can't remember the reaction      Iodinated contrast media Other (See Comments)     Other reaction(s): BURNING ALL OVER    Iodine Other (See Comments) and Hives     Other reaction(s): BURNING ALL OVER - Iodine dye - Not topical    Ondansetron Nausea And Vomiting    Sulfa (sulfonamide antibiotics) Other (See Comments)     Can't remember the reaction    Zofran [ondansetron hcl (pf)] Nausea And Vomiting     Pt reports that last time she received zofran she started vomiting again    Methotrexate analogues Nausea Only    Pneumococcal vaccine Other (See Comments)    Pneumovax 23 [pneumococcal 23-argenis ps vaccine] Other (See Comments)     "sick"    Methotrexate Nausea Only    Last reviewed on  1/4/2023 9:22 AM by Janae Frank      Tasks added this encounter   1/29/2023 - Refill Call (Auto Added)   Tasks due within next 3 months   3/4/2023 - Clinical - Follow Up Assesement (Annual)     Aletha Guadalupe, PharmD  [facility]  [address] [place], [place] [address] e[place]  [facility] [address]  Phone: [phone]  Fax: [phone]        "

## 2023-01-10 ENCOUNTER — TELEPHONE (OUTPATIENT)
Dept: ORTHOPEDICS | Facility: CLINIC | Age: 63
End: 2023-01-10
Payer: MEDICARE

## 2023-01-10 ENCOUNTER — PATIENT MESSAGE (OUTPATIENT)
Dept: RHEUMATOLOGY | Facility: CLINIC | Age: 63
End: 2023-01-10
Payer: MEDICARE

## 2023-01-10 ENCOUNTER — PATIENT MESSAGE (OUTPATIENT)
Dept: SURGERY | Facility: HOSPITAL | Age: 63
End: 2023-01-10
Payer: MEDICARE

## 2023-01-10 NOTE — TELEPHONE ENCOUNTER
Spoke to patient and informed her of her arrival time (5:30AM), location of surgery ([facility]), instructions for pre-wash, and reviewed no eating or drinking after midnight, confirmed call in regards to medication instructions, informed pt no uber or lyft after sx must have ride, if applicable remove any polish, and bring any paperwork given at pre op to sx. Pt verbalized understanding.

## 2023-01-10 NOTE — TELEPHONE ENCOUNTER
Spoke to patient to inform them of their surgery arrival time (630 am), KISHA, [facility]:   Verbalized understanding of instructions for pre-wash, and reviewed NPO after midnight.   Confirmed call in regards to medication instructions from anesthesia.   Confirmed patient was NOT using a rideshare service for surgery arrival or  transportation.   Discussed removing any toe nail polish if applicable   Discussed Bruce Tesfaye has been delivered/picked up.

## 2023-01-11 ENCOUNTER — HOSPITAL ENCOUNTER (OUTPATIENT)
Facility: HOSPITAL | Age: 63
Discharge: HOME | End: 2023-01-12
Attending: ORTHOPAEDIC SURGERY | Admitting: ORTHOPAEDIC SURGERY
Payer: MEDICARE

## 2023-01-11 ENCOUNTER — ANESTHESIA (OUTPATIENT)
Dept: SURGERY | Facility: HOSPITAL | Age: 63
End: 2023-01-11
Payer: MEDICARE

## 2023-01-11 DIAGNOSIS — M54.16 LUMBAR RADICULOPATHY: ICD-10-CM

## 2023-01-11 DIAGNOSIS — M06.9 RHEUMATOID ARTHRITIS, INVOLVING UNSPECIFIED SITE, UNSPECIFIED WHETHER RHEUMATOID FACTOR PRESENT: ICD-10-CM

## 2023-01-11 DIAGNOSIS — M21.42 PES PLANOVALGUS, ACQUIRED, LEFT: ICD-10-CM

## 2023-01-11 DIAGNOSIS — M79.7 FIBROMYALGIA: ICD-10-CM

## 2023-01-11 DIAGNOSIS — M15.9 OSTEOARTHRITIS INVOLVING MULTIPLE JOINTS ON BOTH SIDES OF BODY: Primary | ICD-10-CM

## 2023-01-11 PROCEDURE — 28715 PR FUSION FOOT BONES,TRIPLE: ICD-10-PCS | Mod: LT,,, | Performed by: ORTHOPAEDIC SURGERY

## 2023-01-11 PROCEDURE — 71000033 HC RECOVERY, INTIAL HOUR: Performed by: ORTHOPAEDIC SURGERY

## 2023-01-11 PROCEDURE — 27201423 OPTIME MED/SURG SUP & DEVICES STERILE SUPPLY: Performed by: ORTHOPAEDIC SURGERY

## 2023-01-11 PROCEDURE — 28730 FUSION OF FOOT BONES: CPT | Mod: 51,LT,, | Performed by: ORTHOPAEDIC SURGERY

## 2023-01-11 PROCEDURE — C1713 ANCHOR/SCREW BN/BN,TIS/BN: HCPCS | Performed by: ORTHOPAEDIC SURGERY

## 2023-01-11 PROCEDURE — 63600175 PHARM REV CODE 636 W HCPCS

## 2023-01-11 PROCEDURE — 20900 REMOVAL OF BONE FOR GRAFT: CPT | Mod: 51,,, | Performed by: ORTHOPAEDIC SURGERY

## 2023-01-11 PROCEDURE — 64446 NJX AA&/STRD SC NRV NFS IMG: CPT | Performed by: ANESTHESIOLOGY

## 2023-01-11 PROCEDURE — 63600175 PHARM REV CODE 636 W HCPCS: Performed by: STUDENT IN AN ORGANIZED HEALTH CARE EDUCATION/TRAINING PROGRAM

## 2023-01-11 PROCEDURE — 94761 N-INVAS EAR/PLS OXIMETRY MLT: CPT

## 2023-01-11 PROCEDURE — 36000708 HC OR TIME LEV III 1ST 15 MIN: Performed by: ORTHOPAEDIC SURGERY

## 2023-01-11 PROCEDURE — 28715 ARTHRODESIS TRIPLE: CPT | Mod: LT,,, | Performed by: ORTHOPAEDIC SURGERY

## 2023-01-11 PROCEDURE — D9220A PRA ANESTHESIA: Mod: CRNA,,, | Performed by: NURSE ANESTHETIST, CERTIFIED REGISTERED

## 2023-01-11 PROCEDURE — 25000003 PHARM REV CODE 250: Performed by: ANESTHESIOLOGY

## 2023-01-11 PROCEDURE — D9220A PRA ANESTHESIA: ICD-10-PCS | Mod: CRNA,,, | Performed by: NURSE ANESTHETIST, CERTIFIED REGISTERED

## 2023-01-11 PROCEDURE — 27685 PR LENGTH/SHORT LEG/ANKL TENDON,SINGLE: ICD-10-PCS | Mod: 51,LT,, | Performed by: ORTHOPAEDIC SURGERY

## 2023-01-11 PROCEDURE — 64450 NJX AA&/STRD OTHER PN/BRANCH: CPT | Mod: 59,LT,, | Performed by: ANESTHESIOLOGY

## 2023-01-11 PROCEDURE — C1769 GUIDE WIRE: HCPCS | Performed by: ORTHOPAEDIC SURGERY

## 2023-01-11 PROCEDURE — 63600175 PHARM REV CODE 636 W HCPCS: Performed by: ANESTHESIOLOGY

## 2023-01-11 PROCEDURE — 20900 PR REMV BONE FOR GRAFT MINOR: ICD-10-PCS | Mod: 51,,, | Performed by: ORTHOPAEDIC SURGERY

## 2023-01-11 PROCEDURE — 25000003 PHARM REV CODE 250: Performed by: STUDENT IN AN ORGANIZED HEALTH CARE EDUCATION/TRAINING PROGRAM

## 2023-01-11 PROCEDURE — 27685 REVISION OF LOWER LEG TENDON: CPT | Mod: 51,LT,, | Performed by: ORTHOPAEDIC SURGERY

## 2023-01-11 PROCEDURE — 64450 LEFT POPLITEAL CATHETER: ICD-10-PCS | Mod: 59,LT,, | Performed by: ANESTHESIOLOGY

## 2023-01-11 PROCEDURE — 28730 PR FUSION FOOT BONES,MIDTARSAL,MULTI: ICD-10-PCS | Mod: 51,LT,, | Performed by: ORTHOPAEDIC SURGERY

## 2023-01-11 PROCEDURE — D9220A PRA ANESTHESIA: Mod: ANES,,, | Performed by: ANESTHESIOLOGY

## 2023-01-11 PROCEDURE — 25000003 PHARM REV CODE 250

## 2023-01-11 PROCEDURE — 76942 ECHO GUIDE FOR BIOPSY: CPT | Performed by: ANESTHESIOLOGY

## 2023-01-11 PROCEDURE — C1734 ORTH/DEVIC/DRUG BN/BN,TIS/BN: HCPCS | Performed by: ORTHOPAEDIC SURGERY

## 2023-01-11 PROCEDURE — 99900035 HC TECH TIME PER 15 MIN (STAT)

## 2023-01-11 PROCEDURE — 37000008 HC ANESTHESIA 1ST 15 MINUTES: Performed by: ORTHOPAEDIC SURGERY

## 2023-01-11 PROCEDURE — D9220A PRA ANESTHESIA: ICD-10-PCS | Mod: ANES,,, | Performed by: ANESTHESIOLOGY

## 2023-01-11 PROCEDURE — 25000003 PHARM REV CODE 250: Performed by: NURSE ANESTHETIST, CERTIFIED REGISTERED

## 2023-01-11 PROCEDURE — 64447 LEFT ADDUCTOR SINGLE SHOT: ICD-10-PCS | Mod: 59,LT,, | Performed by: ANESTHESIOLOGY

## 2023-01-11 PROCEDURE — 63600175 PHARM REV CODE 636 W HCPCS: Performed by: NURSE ANESTHETIST, CERTIFIED REGISTERED

## 2023-01-11 PROCEDURE — 64447 NJX AA&/STRD FEMORAL NRV IMG: CPT | Mod: 59,LT,, | Performed by: ANESTHESIOLOGY

## 2023-01-11 PROCEDURE — 36000709 HC OR TIME LEV III EA ADD 15 MIN: Performed by: ORTHOPAEDIC SURGERY

## 2023-01-11 PROCEDURE — 37000009 HC ANESTHESIA EA ADD 15 MINS: Performed by: ORTHOPAEDIC SURGERY

## 2023-01-11 PROCEDURE — 25000003 PHARM REV CODE 250: Performed by: ORTHOPAEDIC SURGERY

## 2023-01-11 DEVICE — KIT AUGMENT BONE GRAFT 3.0CC: Type: IMPLANTABLE DEVICE | Site: FOOT | Status: FUNCTIONAL

## 2023-01-11 DEVICE — IMPLANTABLE DEVICE: Type: IMPLANTABLE DEVICE | Site: FOOT | Status: FUNCTIONAL

## 2023-01-11 RX ORDER — MUPIROCIN 20 MG/G
OINTMENT TOPICAL
Status: DISCONTINUED | OUTPATIENT
Start: 2023-01-11 | End: 2023-01-11 | Stop reason: HOSPADM

## 2023-01-11 RX ORDER — FENTANYL CITRATE 50 UG/ML
25-200 INJECTION, SOLUTION INTRAMUSCULAR; INTRAVENOUS
Status: DISCONTINUED | OUTPATIENT
Start: 2023-01-11 | End: 2023-01-11 | Stop reason: HOSPADM

## 2023-01-11 RX ORDER — HALOPERIDOL 5 MG/ML
0.5 INJECTION INTRAMUSCULAR EVERY 10 MIN PRN
Status: DISCONTINUED | OUTPATIENT
Start: 2023-01-11 | End: 2023-01-11 | Stop reason: HOSPADM

## 2023-01-11 RX ORDER — OXYCODONE HYDROCHLORIDE 10 MG/1
10 TABLET ORAL EVERY 4 HOURS PRN
Status: DISCONTINUED | OUTPATIENT
Start: 2023-01-11 | End: 2023-01-11

## 2023-01-11 RX ORDER — ROPIVACAINE HYDROCHLORIDE 5 MG/ML
INJECTION, SOLUTION EPIDURAL; INFILTRATION; PERINEURAL
Status: COMPLETED | OUTPATIENT
Start: 2023-01-11 | End: 2023-01-11

## 2023-01-11 RX ORDER — SODIUM CHLORIDE 9 MG/ML
INJECTION, SOLUTION INTRAVENOUS CONTINUOUS
Status: DISCONTINUED | OUTPATIENT
Start: 2023-01-11 | End: 2023-01-12 | Stop reason: HOSPADM

## 2023-01-11 RX ORDER — OXYCODONE HYDROCHLORIDE 5 MG/1
5 TABLET ORAL EVERY 4 HOURS PRN
Status: DISCONTINUED | OUTPATIENT
Start: 2023-01-11 | End: 2023-01-11

## 2023-01-11 RX ORDER — SUCRALFATE 1 G/10ML
1 SUSPENSION ORAL EVERY 6 HOURS
Status: DISCONTINUED | OUTPATIENT
Start: 2023-01-11 | End: 2023-01-12 | Stop reason: HOSPADM

## 2023-01-11 RX ORDER — CELECOXIB 200 MG/1
400 CAPSULE ORAL ONCE
Status: COMPLETED | OUTPATIENT
Start: 2023-01-11 | End: 2023-01-11

## 2023-01-11 RX ORDER — NEOSTIGMINE METHYLSULFATE 0.5 MG/ML
INJECTION, SOLUTION INTRAVENOUS
Status: DISCONTINUED | OUTPATIENT
Start: 2023-01-11 | End: 2023-01-11

## 2023-01-11 RX ORDER — CARBOXYMETHYLCELLULOSE SODIUM 5 MG/ML
SOLUTION/ DROPS OPHTHALMIC
Status: DISCONTINUED | OUTPATIENT
Start: 2023-01-11 | End: 2023-01-11

## 2023-01-11 RX ORDER — CEFAZOLIN SODIUM 1 G/3ML
INJECTION, POWDER, FOR SOLUTION INTRAMUSCULAR; INTRAVENOUS
Status: DISCONTINUED | OUTPATIENT
Start: 2023-01-11 | End: 2023-01-11

## 2023-01-11 RX ORDER — ESMOLOL HYDROCHLORIDE 10 MG/ML
INJECTION INTRAVENOUS
Status: DISCONTINUED | OUTPATIENT
Start: 2023-01-11 | End: 2023-01-11

## 2023-01-11 RX ORDER — ALBUTEROL SULFATE 90 UG/1
2 AEROSOL, METERED RESPIRATORY (INHALATION) EVERY 6 HOURS PRN
Status: DISCONTINUED | OUTPATIENT
Start: 2023-01-11 | End: 2023-01-12 | Stop reason: HOSPADM

## 2023-01-11 RX ORDER — KETAMINE HCL IN 0.9 % NACL 50 MG/5 ML
SYRINGE (ML) INTRAVENOUS
Status: DISCONTINUED | OUTPATIENT
Start: 2023-01-11 | End: 2023-01-11

## 2023-01-11 RX ORDER — PROCHLORPERAZINE MALEATE 5 MG
5 TABLET ORAL EVERY 6 HOURS PRN
Status: DISCONTINUED | OUTPATIENT
Start: 2023-01-11 | End: 2023-01-12 | Stop reason: HOSPADM

## 2023-01-11 RX ORDER — ACETAMINOPHEN 500 MG
1000 TABLET ORAL EVERY 6 HOURS
Status: DISCONTINUED | OUTPATIENT
Start: 2023-01-11 | End: 2023-01-12 | Stop reason: HOSPADM

## 2023-01-11 RX ORDER — MONTELUKAST SODIUM 10 MG/1
10 TABLET ORAL NIGHTLY
Status: DISCONTINUED | OUTPATIENT
Start: 2023-01-11 | End: 2023-01-12 | Stop reason: HOSPADM

## 2023-01-11 RX ORDER — LIDOCAINE HYDROCHLORIDE 10 MG/ML
INJECTION, SOLUTION INTRAVENOUS
Status: DISCONTINUED | OUTPATIENT
Start: 2023-01-11 | End: 2023-01-11

## 2023-01-11 RX ORDER — ROCURONIUM BROMIDE 10 MG/ML
INJECTION, SOLUTION INTRAVENOUS
Status: DISCONTINUED | OUTPATIENT
Start: 2023-01-11 | End: 2023-01-11

## 2023-01-11 RX ORDER — MIDAZOLAM HYDROCHLORIDE 1 MG/ML
.5-4 INJECTION INTRAMUSCULAR; INTRAVENOUS
Status: DISCONTINUED | OUTPATIENT
Start: 2023-01-11 | End: 2023-01-11 | Stop reason: HOSPADM

## 2023-01-11 RX ORDER — IPRATROPIUM BROMIDE AND ALBUTEROL SULFATE 2.5; .5 MG/3ML; MG/3ML
3 SOLUTION RESPIRATORY (INHALATION) EVERY 6 HOURS PRN
Status: DISCONTINUED | OUTPATIENT
Start: 2023-01-11 | End: 2023-01-11

## 2023-01-11 RX ORDER — METHOCARBAMOL 500 MG/1
1000 TABLET, FILM COATED ORAL EVERY 8 HOURS
Status: DISCONTINUED | OUTPATIENT
Start: 2023-01-11 | End: 2023-01-12 | Stop reason: HOSPADM

## 2023-01-11 RX ORDER — ACETAMINOPHEN 500 MG
1000 TABLET ORAL 3 TIMES DAILY
Status: DISCONTINUED | OUTPATIENT
Start: 2023-01-11 | End: 2023-01-11

## 2023-01-11 RX ORDER — OXYCODONE HYDROCHLORIDE 5 MG/1
5 TABLET ORAL EVERY 4 HOURS PRN
Status: DISCONTINUED | OUTPATIENT
Start: 2023-01-11 | End: 2023-01-12 | Stop reason: HOSPADM

## 2023-01-11 RX ORDER — TRANEXAMIC ACID 100 MG/ML
INJECTION, SOLUTION INTRAVENOUS
Status: DISCONTINUED | OUTPATIENT
Start: 2023-01-11 | End: 2023-01-11

## 2023-01-11 RX ORDER — PROMETHAZINE HYDROCHLORIDE 12.5 MG/1
12.5 TABLET ORAL EVERY 6 HOURS PRN
Status: DISCONTINUED | OUTPATIENT
Start: 2023-01-11 | End: 2023-01-12 | Stop reason: HOSPADM

## 2023-01-11 RX ORDER — PROPOFOL 10 MG/ML
VIAL (ML) INTRAVENOUS CONTINUOUS PRN
Status: DISCONTINUED | OUTPATIENT
Start: 2023-01-11 | End: 2023-01-11

## 2023-01-11 RX ORDER — PREGABALIN 75 MG/1
150 CAPSULE ORAL ONCE
Status: COMPLETED | OUTPATIENT
Start: 2023-01-11 | End: 2023-01-11

## 2023-01-11 RX ORDER — ROPIVACAINE HYDROCHLORIDE 2 MG/ML
INJECTION, SOLUTION EPIDURAL; INFILTRATION; PERINEURAL CONTINUOUS
Status: DISCONTINUED | OUTPATIENT
Start: 2023-01-11 | End: 2023-01-12 | Stop reason: HOSPADM

## 2023-01-11 RX ORDER — SODIUM CHLORIDE 0.9 % (FLUSH) 0.9 %
10 SYRINGE (ML) INJECTION
Status: DISCONTINUED | OUTPATIENT
Start: 2023-01-11 | End: 2023-01-12 | Stop reason: HOSPADM

## 2023-01-11 RX ORDER — METHOCARBAMOL 750 MG/1
750 TABLET, FILM COATED ORAL 3 TIMES DAILY
Status: DISCONTINUED | OUTPATIENT
Start: 2023-01-11 | End: 2023-01-11

## 2023-01-11 RX ORDER — FAMOTIDINE 10 MG/ML
INJECTION INTRAVENOUS
Status: DISCONTINUED | OUTPATIENT
Start: 2023-01-11 | End: 2023-01-11

## 2023-01-11 RX ORDER — PROPOFOL 10 MG/ML
VIAL (ML) INTRAVENOUS
Status: DISCONTINUED | OUTPATIENT
Start: 2023-01-11 | End: 2023-01-11

## 2023-01-11 RX ORDER — ASPIRIN 325 MG
325 TABLET ORAL DAILY
Status: DISCONTINUED | OUTPATIENT
Start: 2023-01-12 | End: 2023-01-12 | Stop reason: HOSPADM

## 2023-01-11 RX ORDER — ACETAMINOPHEN 500 MG
1000 TABLET ORAL
Status: COMPLETED | OUTPATIENT
Start: 2023-01-11 | End: 2023-01-11

## 2023-01-11 RX ORDER — DEXAMETHASONE SODIUM PHOSPHATE 4 MG/ML
INJECTION, SOLUTION INTRA-ARTICULAR; INTRALESIONAL; INTRAMUSCULAR; INTRAVENOUS; SOFT TISSUE
Status: DISCONTINUED | OUTPATIENT
Start: 2023-01-11 | End: 2023-01-11

## 2023-01-11 RX ORDER — ASPIRIN 325 MG
325 TABLET ORAL DAILY
Qty: 30 TABLET | Refills: 0 | Status: SHIPPED | OUTPATIENT
Start: 2023-01-11 | End: 2023-11-07

## 2023-01-11 RX ORDER — FENTANYL CITRATE 50 UG/ML
INJECTION, SOLUTION INTRAMUSCULAR; INTRAVENOUS
Status: DISCONTINUED | OUTPATIENT
Start: 2023-01-11 | End: 2023-01-11

## 2023-01-11 RX ORDER — CELECOXIB 200 MG/1
200 CAPSULE ORAL 2 TIMES DAILY
Status: DISCONTINUED | OUTPATIENT
Start: 2023-01-11 | End: 2023-01-12 | Stop reason: HOSPADM

## 2023-01-11 RX ORDER — PANTOPRAZOLE SODIUM 40 MG/1
40 TABLET, DELAYED RELEASE ORAL DAILY
Status: DISCONTINUED | OUTPATIENT
Start: 2023-01-12 | End: 2023-01-12 | Stop reason: HOSPADM

## 2023-01-11 RX ADMIN — CARBOXYMETHYLCELLULOSE SODIUM 2 DROP: 5 SOLUTION/ DROPS OPHTHALMIC at 08:01

## 2023-01-11 RX ADMIN — FAMOTIDINE 20 MG: 10 INJECTION, SOLUTION INTRAVENOUS at 08:01

## 2023-01-11 RX ADMIN — PROMETHAZINE HYDROCHLORIDE 12.5 MG: 12.5 TABLET ORAL at 06:01

## 2023-01-11 RX ADMIN — Medication 30 MG: at 08:01

## 2023-01-11 RX ADMIN — CEFAZOLIN 2 G: 330 INJECTION, POWDER, FOR SOLUTION INTRAMUSCULAR; INTRAVENOUS at 12:01

## 2023-01-11 RX ADMIN — OXYCODONE 10 MG: 5 TABLET ORAL at 03:01

## 2023-01-11 RX ADMIN — Medication 20 MG: at 10:01

## 2023-01-11 RX ADMIN — MIDAZOLAM 2 MG: 1 INJECTION INTRAMUSCULAR; INTRAVENOUS at 08:01

## 2023-01-11 RX ADMIN — ACETAMINOPHEN 1000 MG: 500 TABLET ORAL at 07:01

## 2023-01-11 RX ADMIN — ROPIVACAINE HYDROCHLORIDE 10 ML: 5 INJECTION, SOLUTION EPIDURAL; INFILTRATION; PERINEURAL at 07:01

## 2023-01-11 RX ADMIN — PREGABALIN 150 MG: 75 CAPSULE ORAL at 04:01

## 2023-01-11 RX ADMIN — HALOPERIDOL LACTATE 0.5 MG: 5 INJECTION, SOLUTION INTRAMUSCULAR at 03:01

## 2023-01-11 RX ADMIN — ACETAMINOPHEN 1000 MG: 500 TABLET ORAL at 04:01

## 2023-01-11 RX ADMIN — NEOSTIGMINE METHYLSULFATE 4 MG: 0.5 INJECTION, SOLUTION INTRAVENOUS at 02:01

## 2023-01-11 RX ADMIN — LIDOCAINE HYDROCHLORIDE 100 MG: 10 INJECTION, SOLUTION INTRAVENOUS at 08:01

## 2023-01-11 RX ADMIN — DEXAMETHASONE SODIUM PHOSPHATE 8 MG: 4 INJECTION, SOLUTION INTRAMUSCULAR; INTRAVENOUS at 08:01

## 2023-01-11 RX ADMIN — CEFAZOLIN 2 G: 330 INJECTION, POWDER, FOR SOLUTION INTRAMUSCULAR; INTRAVENOUS at 08:01

## 2023-01-11 RX ADMIN — TRANEXAMIC ACID 1000 MG: 100 INJECTION, SOLUTION INTRAVENOUS at 11:01

## 2023-01-11 RX ADMIN — ROPIVACAINE HYDROCHLORIDE 10 ML: 5 INJECTION EPIDURAL; INFILTRATION; PERINEURAL at 07:01

## 2023-01-11 RX ADMIN — FENTANYL CITRATE 50 MCG: 50 INJECTION, SOLUTION INTRAMUSCULAR; INTRAVENOUS at 10:01

## 2023-01-11 RX ADMIN — PROPOFOL 125 MCG/KG/MIN: 10 INJECTION, EMULSION INTRAVENOUS at 08:01

## 2023-01-11 RX ADMIN — ESMOLOL HYDROCHLORIDE 30 MG: 100 INJECTION, SOLUTION INTRAVENOUS at 08:01

## 2023-01-11 RX ADMIN — SODIUM CHLORIDE: 9 INJECTION, SOLUTION INTRAVENOUS at 08:01

## 2023-01-11 RX ADMIN — FENTANYL CITRATE 50 MCG: 50 INJECTION, SOLUTION INTRAMUSCULAR; INTRAVENOUS at 09:01

## 2023-01-11 RX ADMIN — CEFAZOLIN 2 G: 2 INJECTION, POWDER, FOR SOLUTION INTRAMUSCULAR; INTRAVENOUS at 09:01

## 2023-01-11 RX ADMIN — ROCURONIUM BROMIDE 50 MG: 10 INJECTION INTRAVENOUS at 08:01

## 2023-01-11 RX ADMIN — ACETAMINOPHEN 1000 MG: 500 TABLET ORAL at 09:01

## 2023-01-11 RX ADMIN — ROCURONIUM BROMIDE 10 MG: 10 INJECTION INTRAVENOUS at 10:01

## 2023-01-11 RX ADMIN — PROPOFOL 200 MG: 10 INJECTION, EMULSION INTRAVENOUS at 08:01

## 2023-01-11 RX ADMIN — METHOCARBAMOL 1000 MG: 500 TABLET ORAL at 04:01

## 2023-01-11 RX ADMIN — METHOCARBAMOL 1000 MG: 500 TABLET ORAL at 09:01

## 2023-01-11 RX ADMIN — SUCRALFATE 1 G: 1 SUSPENSION ORAL at 09:01

## 2023-01-11 RX ADMIN — ROCURONIUM BROMIDE 20 MG: 10 INJECTION INTRAVENOUS at 09:01

## 2023-01-11 RX ADMIN — CELECOXIB 400 MG: 200 CAPSULE ORAL at 07:01

## 2023-01-11 RX ADMIN — FENTANYL CITRATE 50 MCG: 50 INJECTION, SOLUTION INTRAMUSCULAR; INTRAVENOUS at 11:01

## 2023-01-11 RX ADMIN — GLYCOPYRROLATE 0.4 MG: 0.2 INJECTION, SOLUTION INTRAMUSCULAR; INTRAVENOUS at 02:01

## 2023-01-11 RX ADMIN — SODIUM CHLORIDE, SODIUM GLUCONATE, SODIUM ACETATE, POTASSIUM CHLORIDE, MAGNESIUM CHLORIDE, SODIUM PHOSPHATE, DIBASIC, AND POTASSIUM PHOSPHATE: .53; .5; .37; .037; .03; .012; .00082 INJECTION, SOLUTION INTRAVENOUS at 09:01

## 2023-01-11 RX ADMIN — ROCURONIUM BROMIDE 10 MG: 10 INJECTION INTRAVENOUS at 12:01

## 2023-01-11 RX ADMIN — MUPIROCIN: 20 OINTMENT TOPICAL at 07:01

## 2023-01-11 RX ADMIN — CELECOXIB 200 MG: 200 CAPSULE ORAL at 09:01

## 2023-01-11 RX ADMIN — Medication: at 03:01

## 2023-01-11 RX ADMIN — SODIUM CHLORIDE: 0.9 INJECTION, SOLUTION INTRAVENOUS at 08:01

## 2023-01-11 NOTE — PROGRESS NOTES
Pre op completed. All concerns addressed. Patient belongings placed in locker. Bed in lowest position. Call light within reach. Family at beside.

## 2023-01-11 NOTE — NURSING TRANSFER
Nursing Transfer Note      1/11/2023     Reason patient is being transferred: post anesthesia care complete    Transfer To: 314 recovery suites    Transfer via bed    Transfer with n/a    Transported by RN x1, PCT x1    Medicines sent: ropivicaine infusion in nimbus pump    Any special needs or follow-up needed: routine    Chart send with patient: Yes    Notified: spouse at bedside    Patient reassessed at: 01/11/2023 at 1630

## 2023-01-11 NOTE — OP NOTE
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    Description of procedure:     Patient was seen identified in the preoperative holding area.  Consent was reviewed and surgical site was marked.  The patient also met our anesthesia colleagues who have their own discussion about risks benefits and alternatives to anesthesia options.      Patient was then brought to the operating room.  Anesthesia: General + regional.  Positioning: Supine.  We ensured that all bony prominences were well padded.  A well padded thigh tourniquet was applied.  Cervantes catheter placed due to planned duration of surgery.    The left lower extremity was then prepped and draped routine sterile fashion.  A time-out was then performed confirming patient identification, procedure to be performed, laterality and site, proper equipment, and 2g ancef antibiotic infusion.    I began with the calcaneal bone graft harvest.  Vertical incision was marked centered over the lateral calcaneal tuberosity.  Skin was incised sharply.  Blunt dissection was carried down to the calcaneus.  Utilizing an 8 mm Acumed bone graft harvester, cancellous bone graft was obtained.  The wound was then irrigated.  The defect was then packed  with gel foam.  The incision was closed with interrupted 3-0 nylon.     I then turned my attention to achilles lengthening.  Given the valgus alignment, the achilles was lengthening with 3 shannon-sections (2 lateral and 1 medial) in the manner described by Duy.  An addition 10 degrees of dorsiflexion was obtained with the hindfoot able to be brought to neutral.    At this point, leg was then elevated, exsanguinated with an esmarch and tourniquet inflated.    I began with approach to subtalar and CC joints.  Sinus tarsi localized on fluoroscopy and incision marked extending distally over the CC joint.  Skin incised and dissection carried down to EDB and capsule.  Inline capsulotomy and division of EDB performed sharply.  Joints then exposed with periosteal elevator.  Joint preparation then performed with Hintermann distractor.  Joint surfaces denuded of cartilage, irrigated and fenestrated with drill bit and osteotome.     I then approached the medial column.  Incision marked extending from TN joint to 1st TMTJ.  Skin incised sharply.  Electrocautery used for traversing veins.  Tibial anterior insertion incised sharply and elevated as a sleeve.  Capsulotomies then performed and joint mobilizations performed with Hintermann distractor.    Osteotomies/releases needed: none - joints were remarkably mobile despite degree of arthritis change    Joint preparation then completed with Hintermann distractor and all remaining joint surfaces denuded of cartilage, irrigated and fenestrated with drill bit and osteotome.    Following this, subtalar and CC joint preparation completed and bone graft + rPDGF placed and reduction then provisionally pinned and checked on fluoroscopy.  Similarly the talonavicular joint was prepared and pinned.      Lateral wound was then irrigated, vancomycin applied and closed in layered fashion with 3-0 monocryl and 3-0 nylon.    I turned my attention to exposure of the metatarsal head.  Dorsal  incision performed over MPJ.  Blunt dissection used to identify and protect EHL laterally.  Capsulotomy and necessary releases performed to allow access to the metatarsal head.  The guidewire for the [employer] midfoot nail then advanced to appropriate depth in the talus and checked.  It was then measured.  Reamer then used to adequate depth checking the advancement on fluoroscopy.  Injectable recombinant platelet derived growth factor ([employer] augment) and bone graft then placed in the arthrodesis sites.  Nail then advanced to adequate depth.  Interlocking screw then placed.  Nail then compressed with advancement of the locking screw.  Excellent compression then obtained.   However the interlock screw cut out and was loose.  It was then removed and perfect Naknek technique used to place a more proximal interlock screw through the nail.    was removed and I confirmed the nail remained countersunk.  Wound was then irrigated and capsule closed with 0-vicryl.    Tourniquet was deflated at this point and 1 gram of tranexamic acid was administered.     Medial wound was then irrigated and vancomycin powder applied to wound bed.  Tibialis anterior sleeve with capsules then repaired with 0 vicryl.  Wound then closed in layered fashion with 3-0 monocryl and 3-0 nylon.    I then returned my attention to subtalar and CC joints.  Alignment checked on axial heel.  Screw depths then measured and inserted sequentially starting with subtalar joint then CC joint.      Final fluoroscopic images were then obtained.  Wounds then irrigated.  Vancomycin applied to wound beds.  Wounds then closed in layered fashion with 3-0 monocryl and 3-0 nylon.    An Adaptic, gauze, ABD padded dressing was then applied.  This was then secured with sterile cast padding.  A well-padded splint was then applied and secured loosely with ace wraps.    At the conclusion of procedure, all needle and sponge counts were correct.  The case  postoperative de-briefing was held confirming the procedure performed, any relevant specimens and/or postop concerns, and patient disposition.      Complications: No    Estimated Blood Loss (EBL): 150cc           Implants: [employer] midfoot nail and [employer] 7.0mm beams    Specimens: None           Condition: Good    Disposition: PACU - hemodynamically stable.    Postop plan:   1. Weight bearing status: NWB LLE x6 weeks  2. Immobilization: Splint x 2 weeks and then boot vs cast x4 weeks (pending ability to tolerate cast) and then walking boot until postop CT @ 3 months  3. DVT prophylaxis: aspirin daily  4. Follow-up: 2 weeks with PA; X-Rays: 3v left foot, place PT orders  --recheck with Dr. Finnegan in 6 weeks; X-Rays: 3v standing left foot  5. PT: Start date: 2 months postop  Rx/protocol and restrictions:   --Gait training, edema control/desensitization modalities  --ADAT WBAT LLE, immobilization: boot; may initiate boot weaning after released by MD after CT scane   --AROM/AAROM and gentle strengthening/theraband work, but no PROM/manipulation - foot joints are fused; ankle manipulation (DORSIFLEXION especially) is ok  6. Rheum: ok to resume DMARD once wound healed

## 2023-01-11 NOTE — TRANSFER OF CARE
"Anesthesia Transfer of Care Note    Patient: Joyce Peterson    Procedure(s) Performed: Procedure(s) (LRB):  FUSION, FOOT (Left)  SURGICAL PROCUREMENT, BONE GRAFT (Left)  LENGTHENING, TENDON, LOWER EXTREMITY (Left)    Patient location: PACU    Anesthesia Type: general and regional    Transport from OR: Transported from OR on room air with adequate spontaneous ventilation. Transported from OR on 6-10 L/min O2 by face mask with adequate spontaneous ventilation    Post pain: adequate analgesia    Post assessment: no apparent anesthetic complications and tolerated procedure well    Post vital signs: stable    Level of consciousness: awake    Nausea/Vomiting: no nausea/vomiting    Complications: none    Transfer of care protocol was followed      Last vitals:   Visit Vitals  /80 (BP Location: Right arm, Patient Position: Lying)   Pulse 81   Temp 36.6 °C (97.9 °F) (Oral)   Resp 12   Ht 5' 1" (1.549 m)   Wt 64 kg (141 lb)   LMP  (LMP Unknown)   SpO2 98%   Breastfeeding No   BMI 26.64 kg/m²     "

## 2023-01-11 NOTE — ANESTHESIA PROCEDURE NOTES
Left popliteal catheter    Patient location during procedure: pre-op   Block not for primary anesthetic.  Reason for block: at surgeon's request and post-op pain management   Post-op Pain Location: Left foot pain   Start time: 1/11/2023 7:31 AM  Timeout: 1/11/2023 7:30 AM   End time: 1/11/2023 7:39 AM    Staffing  Authorizing Provider: Sophia Rivera MD  Performing Provider: Sophia Rivera MD    Preanesthetic Checklist  Completed: patient identified, IV checked, site marked, risks and benefits discussed, surgical consent, monitors and equipment checked, pre-op evaluation and timeout performed  Peripheral Block  Patient position: supine  Prep: ChloraPrep and site prepped and draped  Patient monitoring: heart rate, cardiac monitor, continuous pulse ox, continuous capnometry and frequent blood pressure checks  Block type: popliteal  Laterality: left  Injection technique: continuous  Needle  Needle type: Tuohy   Needle gauge: 18 G  Needle length: 3.5 in  Needle localization: anatomical landmarks and ultrasound guidance  Catheter type: non-stimulating  Catheter size: 20 G  Test dose: lidocaine 1.5% with Epi 1-to-200,000 and negative   -ultrasound image captured on disc.  Assessment  Injection assessment: negative aspiration, negative parasthesia and local visualized surrounding nerve  Paresthesia pain: none  Heart rate change: no  Slow fractionated injection: yes  Pain Tolerance: comfortable throughout block and no complaints  Medications:    Medications: ropivacaine (NAROPIN) injection 0.5% - Perineural   10 mL - 1/11/2023 7:38:00 AM    Additional Notes  VSS.  [facility] RN monitoring vitals throughout procedure.  Patient tolerated procedure well.     With 10mL normal saline, 1:200,000 epi

## 2023-01-11 NOTE — ANESTHESIA PROCEDURE NOTES
Left adductor single shot    Patient location during procedure: pre-op   Block not for primary anesthetic.  Reason for block: at surgeon's request and post-op pain management   Post-op Pain Location: Left foot pain   Start time: 1/11/2023 7:39 AM  Timeout: 1/11/2023 7:30 AM   End time: 1/11/2023 7:42 AM    Staffing  Authorizing Provider: Sophia Rivera MD  Performing Provider: Sophia Rivera MD    Preanesthetic Checklist  Completed: patient identified, IV checked, site marked, risks and benefits discussed, surgical consent, monitors and equipment checked, pre-op evaluation and timeout performed  Peripheral Block  Patient position: supine  Prep: ChloraPrep  Patient monitoring: heart rate, cardiac monitor, continuous pulse ox, continuous capnometry and frequent blood pressure checks  Block type: adductor canal  Laterality: left  Injection technique: single shot  Needle  Needle type: Stimuplex   Needle gauge: 21 G  Needle length: 4 in  Needle localization: anatomical landmarks and ultrasound guidance   -ultrasound image captured on disc.  Assessment  Injection assessment: negative aspiration, negative parasthesia and local visualized surrounding nerve  Paresthesia pain: none  Heart rate change: no  Slow fractionated injection: yes  Pain Tolerance: comfortable throughout block and no complaints  Medications:    Medications: ropivacaine (NAROPIN) injection 0.5% - Perineural   10 mL - 1/11/2023 7:40:00 AM    Additional Notes  VSS.  [facility] RN monitoring vitals throughout procedure.  Patient tolerated procedure well.     With 10mL normal saline, 1:200,000 epi,1mg PF decadron, 50mcg clonidine

## 2023-01-12 VITALS
WEIGHT: 141 LBS | RESPIRATION RATE: 18 BRPM | OXYGEN SATURATION: 97 % | DIASTOLIC BLOOD PRESSURE: 66 MMHG | TEMPERATURE: 99 F | HEART RATE: 83 BPM | HEIGHT: 61 IN | BODY MASS INDEX: 26.62 KG/M2 | SYSTOLIC BLOOD PRESSURE: 122 MMHG

## 2023-01-12 PROCEDURE — 25000003 PHARM REV CODE 250: Performed by: ANESTHESIOLOGY

## 2023-01-12 PROCEDURE — 99213 PR OFFICE/OUTPT VISIT, EST, LEVL III, 20-29 MIN: ICD-10-PCS | Mod: ,,, | Performed by: ANESTHESIOLOGY

## 2023-01-12 PROCEDURE — 97530 THERAPEUTIC ACTIVITIES: CPT

## 2023-01-12 PROCEDURE — 97161 PT EVAL LOW COMPLEX 20 MIN: CPT

## 2023-01-12 PROCEDURE — 99213 OFFICE O/P EST LOW 20 MIN: CPT | Mod: ,,, | Performed by: ANESTHESIOLOGY

## 2023-01-12 PROCEDURE — 97165 OT EVAL LOW COMPLEX 30 MIN: CPT

## 2023-01-12 PROCEDURE — 97535 SELF CARE MNGMENT TRAINING: CPT

## 2023-01-12 PROCEDURE — 25000003 PHARM REV CODE 250

## 2023-01-12 PROCEDURE — 63600175 PHARM REV CODE 636 W HCPCS

## 2023-01-12 RX ADMIN — CELECOXIB 200 MG: 200 CAPSULE ORAL at 08:01

## 2023-01-12 RX ADMIN — MONTELUKAST 10 MG: 10 TABLET, FILM COATED ORAL at 08:01

## 2023-01-12 RX ADMIN — ACETAMINOPHEN 1000 MG: 500 TABLET ORAL at 08:01

## 2023-01-12 RX ADMIN — ASPIRIN 325 MG ORAL TABLET 325 MG: 325 PILL ORAL at 08:01

## 2023-01-12 RX ADMIN — ACETAMINOPHEN 1000 MG: 500 TABLET ORAL at 02:01

## 2023-01-12 RX ADMIN — SUCRALFATE 1 G: 1 SUSPENSION ORAL at 08:01

## 2023-01-12 RX ADMIN — METHOCARBAMOL 1000 MG: 500 TABLET ORAL at 06:01

## 2023-01-12 RX ADMIN — PANTOPRAZOLE SODIUM 40 MG: 40 TABLET, DELAYED RELEASE ORAL at 08:01

## 2023-01-12 RX ADMIN — OXYCODONE 5 MG: 5 TABLET ORAL at 12:01

## 2023-01-12 RX ADMIN — CEFAZOLIN 2 G: 2 INJECTION, POWDER, FOR SOLUTION INTRAMUSCULAR; INTRAVENOUS at 06:01

## 2023-01-12 RX ADMIN — SUCRALFATE 1 G: 1 SUSPENSION ORAL at 03:01

## 2023-01-12 NOTE — PLAN OF CARE
01/11/23 1817   ONEAL Message   Medicare Outpatient and Observation Notification regarding financial responsibility Given to patient/caregiver   Date ONEAL was signed 01/11/23   Time ONEAL was signed 1817

## 2023-01-12 NOTE — PT/OT/SLP EVAL
Occupational Therapy   Evaluation and Discharge Note    Name: Joyce Peterson  MRN: [id number]  Admitting Diagnosis: Pes planovalgus, acquired, left  Recent Surgery: Procedure(s) (LRB):  FUSION, FOOT (Left)  SURGICAL PROCUREMENT, BONE GRAFT (Left)  LENGTHENING, TENDON, LOWER EXTREMITY (Left) 1 Day Post-Op    Recommendations:     Discharge Recommendations: home  Discharge Equipment Recommendations:  none  Barriers to discharge:  None    Assessment:     Joyce Peterson is a 62 y.o. female with a medical diagnosis of Pes planovalgus, acquired, left.  She presents with L foot fusion and is NWB L LE.  Able to perform supine/sit, sit/stand, and BSC T/F c CGA and RW.  Unable to walk to bathroom as pt uses W/C at home for long distances.  Able to perform UB dressing c set-up and LB dressing c max A.  Performed toilet hygiene c set-up.  Pt is progressing well and and will D/C from inpatient OT.  Performance deficits affecting function: impaired self care skills, impaired functional mobility, orthopedic precautions.      Rehab Prognosis: Good; patient would benefit from acute skilled OT services to address these deficits and reach maximum level of function.       Plan:     Patient to be seen 4 x/week to address the above listed problems via self-care/home management, therapeutic activities, therapeutic exercises  Plan of Care Expires: 01/12/23  Plan of Care Reviewed with: patient    Subjective     Chief Complaint: L foot fusion and is NWB L LE  Patient/Family Comments/goals: I don't think I can walk    Occupational Profile:  Living Environment: Pt lives in a one story house c 5 NIMESH and has a walk-in shower.  Previous level of function: I PTA  Roles and Routines: Retired  Equipment Used at Home: bedside commode, walker, rolling  Assistance upon Discharge: Pt lives c her     Pain/Comfort:  Pain Rating 1: 0/10    Patients cultural, spiritual, Anabaptist conflicts given the current situation: no    Objective:      Communicated with: RN prior to session.  Patient found supine with perineural catheter upon OT entry to room.    General Precautions: Standard, fall  Orthopedic Precautions: LLE non weight bearing  Braces:  (Short leg splint)  Respiratory Status: Room air    Occupational Performance:    Bed Mobility:    Patient completed Supine to Sit with supervision    Functional Mobility/Transfers:  Patient completed Sit <> Stand Transfer with contact guard assistance  with  rolling walker   Patient completed Bed <> Chair Transfer using Stand Pivot technique with contact guard assistance with rolling walker  Patient completed Toilet Transfer Stand Pivot technique with contact guard assistance with  rolling walker and bedside commode  Functional Mobility: Pt is unable to walk and will do stand pivot T/F's to W/C    Activities of Daily Living:  Upper Body Dressing: stand by assistance to don shirt  Lower Body Dressing: maximal assistance to don pants and SBA to don sock and shoe to R LE  Toileting: modified independence for toilet hygiene on [facility]      Physical Exam:  Upper Extremity Range of Motion:     -       Right Upper Extremity: WFL  -       Left Upper Extremity: WFL  Upper Extremity Strength:    -       Right Upper Extremity: WFL  -       Left Upper Extremity: WFL    AMPAC 6 Click ADL:  AMPAC Total Score: 18    Patient left sitting edge of bed with all lines intact, call button in reach, RN notified, and  present    GOALS:   Multidisciplinary Problems       Occupational Therapy Goals          Problem: Occupational Therapy    Goal Priority Disciplines Outcome Interventions   Occupational Therapy Goal     OT, PT/OT Ongoing, Progressing    Description: Goals to be met by: 1/12/23     Patient will increase functional independence with ADLs by performing:    UE Dressing with Modified [place].  LE Dressing with Modified [place] and Assistive Devices as needed.  Grooming while standing at sink with Contact Guard  Assistance.  Toileting from bedside commode with Contact Guard Assistance for hygiene and clothing management.   Bathing from  shower chair/bench with Contact Guard Assistance.  Toilet transfer to bedside commode with Contact Guard Assistance.                         History:     Past Medical History:   Diagnosis Date    Acid reflux     Allergy     Anemia     Asthma     Coronary artery disease     CS (cervical spondylosis) 03/08/2013    Degenerative disc disease     Dry eyes     Dry mouth     Gastroparesis     History of methotrexate therapy 01/19/2022    Hyperlipidemia     Lateral meniscus derangement 04/06/2016    Lobular carcinoma in situ     Lumbar spondylosis 03/08/2013    Osteoarthritis     Osteoporosis     Rheumatoid arthritis(714.0)     Rupture of left triceps tendon 10/17/2018    Umbilical hernia 08/13/2015         Past Surgical History:   Procedure Laterality Date    BREAST BIOPSY Left 01/29/2002    core bx    CARPAL TUNNEL RELEASE Right 05/2017    CHOLECYSTECTOMY  2004    COLONOSCOPY      10/11    COLONOSCOPY N/A 6/29/2017    Procedure: COLONOSCOPY;  Surgeon: López Moore MD;  Location: [facility] ENDO ([address] [place] [address]);  Service: Endoscopy;  Laterality: N/A;    EPIDURAL STEROID INJECTION N/A 11/18/2021    Procedure: INJECTION, STEROID, EPIDURAL, L5-S1 IL NEED CONSENT;  Surgeon: Tj Mayfield MD;  Location: [facility] PAIN MGT;  Service: Pain Management;  Laterality: N/A;    EPIDURAL STEROID INJECTION N/A 9/29/2022    Procedure: LUMBAR L3/L4 IL MADELINE CONTRAST;  Surgeon: Tj Mayfield MD;  Location: [facility] PAIN MGT;  Service: Pain Management;  Laterality: N/A;    EPIDURAL STEROID INJECTION N/A 12/12/2022    Procedure: INJECTION, STEROID, EPIDURAL L5/S1 IL CONTRAST;  Surgeon: Tj Mayfield MD;  Location: [facility] PAIN MGT;  Service: Pain Management;  Laterality: N/A;    FOOT ARTHRODESIS Left 1/11/2023    Procedure: FUSION, FOOT;  Surgeon: Ariella Finnegan MD;  Location: [facility] OR;  Service: Orthopedics;  Laterality: Left;  [facility]     HARVESTING OF BONE GRAFT Left 1/11/2023    Procedure: SURGICAL PROCUREMENT, BONE GRAFT;  Surgeon: Ariella Finnegan MD;  Location: [facility];  Service: Orthopedics;  Laterality: Left;    INJECTION OF ANESTHETIC AGENT AROUND NERVE Bilateral 8/23/2021    Procedure: BLOCK, NERVE, MEDIAL BRANCH L3,L4,L5;  Surgeon: Tj Mayfield MD;  Location: [facility] PAIN MGT;  Service: Pain Management;  Laterality: Bilateral;  1 of 2    INJECTION OF ANESTHETIC AGENT AROUND NERVE Bilateral 8/26/2021    Procedure: BLOCK, NERVE, MEDIAL BRANCH L3,L4,L5;  Surgeon: Tj Mayfield MD;  Location: [facility] PAIN MGT;  Service: Pain Management;  Laterality: Bilateral;  2 of 2    INJECTION OF ANESTHETIC AGENT AROUND NERVE Left 7/7/2022    Procedure: BLOCK, NERVE, LEFT OBTURATOR AND FEMORAL;  Surgeon: Tj Mayfield MD;  Location: [facility] PAIN MGT;  Service: Pain Management;  Laterality: Left;    INJECTION OF FACET JOINT Bilateral 3/12/2020    Procedure: FACET JOINT INJECTION (LUMBAR BLOCK) BILATERAL L4-5 AND L5-S1 DIRECT REFERRAL;  Surgeon: Tj Mayfield MD;  Location: [facility] PAIN MGT;  Service: Pain Management;  Laterality: Bilateral;  NEEDS CONSENT    INJECTION OF FACET JOINT Bilateral 3/29/2021    Procedure: INJECTION, FACET JOINT L3/4, L4/5, L5/S1;  Surgeon: Tj Mayfield MD;  Location: [facility] PAIN [facility]GT;  Service: Pain Management;  Laterality: Bilateral;    INJECTION OF JOINT Right 7/30/2020    Procedure: INJECTION, JOINT, RIGHT HIP Interarticular under flouro;  Surgeon: Tj Mayfield MD;  Location: [facility] PAIN [facility]GT;  Service: Pain Management;  Laterality: Right;  INJECTION, JOINT, RIGHT HIP Interarticular under flouro    INJECTION OF JOINT Right 2/21/2022    Procedure: Injection, Joint RIGHT ISCHIAL BURSA;  Surgeon: Tj Mayfield MD;  Location: [facility] PAIN MGT;  Service: Pain Management;  Laterality: Right;    INTRAMEDULLARY RODDING OF FEMUR Left 5/21/2019    Procedure: INSERTION, INTRAMEDULLARY ARIEL, FEMUR;  Surgeon: López Duff MD;  [facility];  Service:  Orthopedics;  Laterality: Left;    LENGTHENING OF TENDON OF LOWER EXTREMITY Left 1/11/2023    Procedure: LENGTHENING, TENDON, LOWER EXTREMITY;  Surgeon: Ariella Finnegan MD;  Location: [facility] OR;  Service: Orthopedics;  Laterality: Left;    RADIOFREQUENCY ABLATION Left 9/20/2021    Procedure: RADIOFREQUENCY ABLATION left L3,4,5 RFA  1 of 2  CONSENT NEEDED;  Surgeon: Tj Mayfield MD;  Location: [facility] PAIN MGT;  Service: Pain Management;  Laterality: Left;    RADIOFREQUENCY ABLATION Right 10/4/2021    Procedure: RADIOFREQUENCY ABLATION  right L3,4,5 RFA   2 of 2  CONSENT NEEDED;  Surgeon: Tj Mayfield MD;  Location: [facility] PAIN MGT;  Service: Pain Management;  Laterality: Right;    RADIOFREQUENCY ABLATION Left 4/21/2022    Procedure: Radiofrequency Ablation LEFT L3,L4,L5;  Surgeon: Tj Mayfield MD;  Location: [facility] PAIN MGT;  Service: Pain Management;  Laterality: Left;    RADIOFREQUENCY ABLATION Right 5/12/2022    Procedure: Radiofrequency Ablation RIGHT L3,L4,L5;  Surgeon: Tj Mayfield MD;  Location: [facility] PAIN MGT;  Service: Pain Management;  Laterality: Right;    RADIOFREQUENCY ABLATION Left 7/25/2022    Procedure: Radiofrequency Ablation Left Femoral & Obturator;  Surgeon: Tj Mayfield MD;  Location: [facility] PAIN MGT;  Service: Pain Management;  Laterality: Left;    REPAIR OF TRICEPS TENDON Left 10/17/2018    Procedure: REPAIR, TENDON, TRICEPS left elbow;  Surgeon: Staci Yarbrough MD;  Location: [facility] OR [address] [place] [address];  Service: Orthopedics;  Laterality: Left;  Anesthesia: General and regional. PRONE, k-wire , hand pan 1 and pan 2, CALL ARTHREX/Tamera notified 10-12 LO    TONSILLECTOMY      TRANSFORAMINAL EPIDURAL INJECTION OF STEROID Right 8/31/2020    Procedure: INJECTION, STEROID, EPIDURAL, TRANSFORAMINAL,  APPROACH, L3-L4 and L4-L5 need consent;  Surgeon: Tj Mayfield MD;  Location: [facility] PAIN MGT;  Service: Pain Management;  Laterality: Right;    TRANSFORAMINAL EPIDURAL INJECTION OF STEROID Bilateral 7/23/2021     Procedure: INJECTION, STEROID, EPIDURAL, TRANSFORAMINAL APPROACH L4/5;  Surgeon: Tj Mayfield MD;  Location: [facility] PAIN MGT;  Service: Pain Management;  Laterality: Bilateral;    TRIGGER POINT INJECTION N/A 7/23/2021    Procedure: INJECTION, TRIGGER POINT SCAPULAR;  Surgeon: Tj Mayfield MD;  Location: [facility] PAIN MGT;  Service: Pain Management;  Laterality: N/A;    TUBAL LIGATION  2003    UPPER GASTROINTESTINAL ENDOSCOPY      10/11    uterine ablation  2003       Time Tracking:     OT Date of Treatment: 01/12/23  OT Start Time: 1106  OT Stop Time: 1127  OT Total Time (min): 21 min    Billable Minutes:Evaluation 11  Self Care/Home Management 10    1/12/2023

## 2023-01-12 NOTE — NURSING
Has met unit/department guidelines for discharge from each phase of the post procedure continuum. Patient discharged.  Instructions and Prescriptions given.  IV removed, tolerated well.  Patient verbalized understanding instructions.  AAOx3, VSS, NADN, no complaints noted at this time.  Wheelchair to private vehicle in care of spouse.

## 2023-01-12 NOTE — PROGRESS NOTES
Dictation #1  MRN:[id number]  CSN:[id number] Acute Pain Service and Perioperative Surgical Home Progress Note    HPI  Joyce Peterson is a 62 y.o., female, status post foot surgery with no acute events overnight      Surgery:  Procedure(s) (LRB):  FUSION, FOOT (Left)  SURGICAL PROCUREMENT, BONE GRAFT (Left)  LENGTHENING, TENDON, LOWER EXTREMITY (Left)    Post Op Day #: 1      Infusion type: Ropivacaine 0.2%, with a 7cc automatic bolus every 3 hours, combined with a 5 cc patient controlled bolus available [id number]    Problem List:  There are no hospital problems to display for this patient.      Subjective:       General appearance of alert, oriented, no complaints   Pain with rest: 1    Numbers   Pain with movement: 2    Numbers   Side Effects    1. Pruritis No    2. Nausea No    3. Motor Blockade Yes,     4. Sedation Yes, 1=awake and alert    Schedule Medications:    acetaminophen  1,000 mg Oral Q6H    aspirin  325 mg Oral Daily    ceFAZolin (ANCEF) IVPB  2 g Intravenous Q8H    celecoxib  200 mg Oral BID    methocarbamoL  1,000 mg Oral Q8H    montelukast  10 mg Oral QHS    pantoprazole  40 mg Oral Daily    sucralfate  1 g Oral Q6H        Continuous Infusions:   sodium chloride 0.9% 100 mL/hr at 01/11/23 0819    ropivacaine (PF) 2 mg/ml (0.2%)          PRN Medications:  albuterol, oxyCODONE, prochlorperazine, promethazine, sodium chloride 0.9%       Antibiotics:  Antibiotics (From admission, onward)      Start     Stop Route Frequency Ordered    01/11/23 2100  ceFAZolin 2 g in dextrose 5 % in water (D5W) 5 % 50 mL IVPB (MB+)         01/12 1259 IV Every 8 hours (non-standard times) 01/11/23 1527               Objective:     Catheter site clean, dry, intact          Vital Signs (Most Recent):  Temp: 98.1 °F (36.7 °C) (01/12/23 0408)  Pulse: 93 (01/12/23 0408)  Resp: 16 (01/12/23 0408)  BP: 111/62 (01/12/23 0408)  SpO2: 97 % (01/12/23 0408)   Vital Signs Range (Last 24H):  Temp:  [96.4 °F (35.8 °C)-98.2 °F (36.8 °C)]    Pulse:  [69-95]   Resp:  [12-27]   BP: (107-151)/()   SpO2:  [95 %-99 %]          I & O (Last 24H):  Intake/Output Summary (Last 24 hours) at 1/12/2023 0539  Last data filed at 1/12/2023 0414  Gross per 24 hour   Intake 2500 ml   Output 2125 ml   Net 375 ml       Physical Exam:    GA: Alert, comfortable, no acute distress.   Pulmonary: Clear to auscultation. Normal RR.    Cardiac: regular rate and rhythm.      Laboratory: reviewed in chart  CBC: No results for input(s): WBC, RBC, HGB, HCT, PLT, MCV, MCH, MCHC in the last 72 hours.    BMP: No results for input(s): NA, K, CO2, CL, BUN, CREATININE, GLU, MG, PHOS, CALCIUM in the last 72 hours.    No results for input(s): PT, INR, PROTIME, APTT in the last 72 hours.          Assessment:  Patient doing well with no acute events overnight.  Initially postop had complete block of the foot.  On rounds last night was beginning to get some sensation back to the toes, the toes are warm and without abnormal findings postoperatively.  Pain is well controlled       Pain control adequate    Plan:     1) Pain:  Multimodal pain regimen ordered which includes acetaminophen, celecoxib, pregabalin, and prn oxycodone.  Will continue to monitor. Plan to discharge with Nimbus pain pump.   2) rheumatoid arthritis, continue current regimen   3) asthma, continue current regimen   4) FEN/GI: Tolerating regular diet.     5) Dispo: Pt working well with PT/OT. Case management and SW following along for setting up home health and physical therapy. Plan to discharge home this am.          Evaluator Eyal Bryson PA-C            Patient evaluated and examined at bedside.  Labs and vitals reviewed.   Medications reviewed and changes made as indicated.    Patients dressing to nimbus with some fluid - replaced on rounds and adjusted Nimbus infusion - alarm on Nimbus alerted this am - decision made to replace Nimbus pump - otherwise pain well controlled.  Dc home with Nimbus pump later this  am.

## 2023-01-12 NOTE — DISCHARGE SUMMARY
[facility])  Short Stay  Discharge Summary    Admit Date: 1/11/2023    Discharge Date and Time:  01/12/2023 9:20 AM      Discharge Attending Physician: Ariella Finnegan MD     Hospital Course On 01/11/23, the patient arrived to the [facility] for pre-operative management.  Upon completion of the pre-operative preparation, the patient was taken back to the operative theatre. The above procedure was performed without complication and the patient was transported to the post anesthesia care unit in stable condition.  After appropriate recovery from the anaesthetic agents used during the surgery, the patient was then transported to the inpatient floor.  The interim of the hospital stay from arrival on the floor up to discharge has been uncomplicated. The patient has tolerated regular diet.  The patient's pain has been controlled using a multimodal approach. Currently, the patient's pain is well controlled on an oral regimen.  The patient has been voiding without difficulty.  The patient began participation in physical therapy after surgery and has progressed throughout the entire hospital stay.  Currently, the patient's progress is sufficient to allow the them to be discharged to home safely.  The patient agrees with this assessment and desires a discharge today.    Final Diagnoses:    Principal Problem: Pes planovalgus, acquired, left     Discharged Condition: stable    Disposition: Home or Self Care    Follow up/Patient Instructions:    Medications:  Reconciled Home Medications:      Medication List        CHANGE how you take these medications      aspirin 325 MG tablet  Take 1 tablet (325 mg total) by mouth once daily.  What changed:   medication strength  how much to take  when to take this            CONTINUE taking these medications      acetaminophen 500 MG tablet  Commonly known as: TYLENOL  Take 2 tablets (1,000 mg total) by mouth 3 (three) times daily.  for 7 days     albuterol 90 mcg/actuation inhaler  Commonly known as: PROVENTIL/VENTOLIN HFA  INHALE 2 PUFFS INTO THE LUNGS EVERY 6 (SIX) HOURS AS NEEDED. RESCUE     albuterol-ipratropium 2.5 mg-0.5 mg/3 mL nebulizer solution  Commonly known as: DUO-NEB  Take 3 mLs by nebulization every 6 (six) hours as needed for Wheezing. Rescue     ALPRAZolam 0.5 MG tablet  Commonly known as: XANAX  Take Once on call to procedure     budesonide-formoterol 160-4.5 mcg 160-4.5 mcg/actuation Hfaa  Commonly known as: SYMBICORT  INHALE 2 PUFFS INTO THE LUNGS EVERY 12 (TWELVE) HOURS.     calcium citrate-vitamin D3 315-200 mg 315 mg-5 mcg (200 unit) per tablet  Commonly known as: CITRACAL+D  Take 1 tablet by mouth 2 (two) times daily.     cefadroxil 500 MG Cap  Commonly known as: DURICEF  Take 1 capsule (500 mg total) by mouth every 12 (twelve) hours. for 10 days     cycloSPORINE 0.05 % ophthalmic emulsion  Commonly known as: RESTASIS  Instill one drop into both eyes two times per day     diazePAM 10 MG Tab  Commonly known as: VALIUM  Take 1 tablet (10 mg total) by mouth once as needed.     diclofenac sodium 1 % Gel  Commonly known as: VOLTAREN  APPLY 2 GRAMS TOPICALLY 4 (FOUR) TIMES DAILY AS NEEDED.     DUOBRII 0.01-0.045 % Lotn  Generic drug: halobetasol propion-tazarotene  aaa on feet and hands qhs  then vaseline and warm towel     INV PROPULSID 10 MG  Take 2 tablets (20 mg) by mouth 4 (four) times daily. FOR INVESTIGATIONAL USE ONLY. Protocol [id number]  Subject ID: [id number].     ketoconazole 2 % cream  Commonly known as: NIZORAL  Apply to feet twice daily for 3 weeks     KEVZARA 200 mg/1.14 mL Syrg  Generic drug: sarilumab  Inject 1.14 mL (200 mg total) into the skin every 14 (fourteen) days.     leflunomide 20 MG Tab  Commonly known as: ARAVA  TAKE 1 TABLET BY MOUTH EVERY DAY     methenamine 1 gram Tab  Commonly known as: HIPREX  Take 1 tablet (1 g total) by mouth 2 (two) times daily.     methocarbamoL 750 MG Tab  Commonly known  as: ROBAXIN  Take 1 tablet (750 mg total) by mouth 3 (three) times daily. for 14 days     montelukast 10 mg tablet  Commonly known as: SINGULAIR  TAKE 1 TABLET BY MOUTH EVERY DAY AT NIGHT     MYRBETRIQ 25 mg Tb24 ER tablet  Generic drug: mirabegron  Take 1 tablet (25 mg total) by mouth once daily.     omeprazole 40 MG capsule  Commonly known as: PRILOSEC  Take 1 capsule (40 mg total) by mouth every morning.     omeprazole-sodium bicarbonate 40-1.1 mg-gram per capsule  Commonly known as: ZEGERID  TAKE 1 CAPSULE BY MOUTH EVERY DAY IN THE MORNING     oxyCODONE 5 MG immediate release tablet  Commonly known as: ROXICODONE  Take 1 tablet (5 mg total) by mouth every 4 (four) hours as needed for Pain.     POTASSIUM ORAL  Take by mouth once daily.     * predniSONE 1 MG tablet  Commonly known as: DELTASONE  Take 5 tablets (5 mg total) by mouth once daily.     * predniSONE 5 MG tablet  Commonly known as: DELTASONE  TAKE 1 TABLET BY MOUTH EVERY DAY     * PREMARIN 0.625 MG tablet  Generic drug: estrogens (conjugated)  take 1 tablet by mouth daily     * PREMARIN vaginal cream  Generic drug: conjugated estrogens  PLACE 0.5 GRAM VAGINALLY 3 (THREE) TIMES A WEEK.     * progesterone 100 MG capsule  Commonly known as: PROMETRIUM  Take 1 capsule by mouth daily.     * progesterone 100 MG capsule  Commonly known as: PROMETRIUM  take 1 capsule by mouth daily     * promethazine 25 MG tablet  Commonly known as: PHENERGAN  Take 1 tablet (25 mg total) by mouth every 6 (six) hours as needed for Nausea.     * promethazine 25 MG tablet  Commonly known as: PHENERGAN  Take 1 tablet (25 mg total) by mouth every 6 (six) hours as needed for Nausea.     rosuvastatin 20 MG tablet  Commonly known as: CRESTOR  Take 1 tablet (20 mg total) by mouth every evening.     * sucralfate 1 gram tablet  Commonly known as: CARAFATE  Take 1 tablet (1 g total) by mouth 4 (four) times daily.     * sucralfate 1 gram tablet  Commonly known as: CARAFATE  TAKE 1 TABLET (1  "G TOTAL) BY MOUTH 4 (FOUR) TIMES DAILY.     XIIDRA 5 % Dpet  Generic drug: lifitegrast  INSTILL ONE DROP INTO BOTH EYES TWICE A DAY           * This list has 10 medication(s) that are the same as other medications prescribed for you. Read the directions carefully, and ask your doctor or other care provider to review them with you.                STOP taking these medications      clindamycin 300 MG capsule  Commonly known as: CLEOCIN     fluconazole 100 MG tablet  Commonly known as: DIFLUCAN     ibuprofen 800 MG tablet  Commonly known as: ADVIL,MOTRIN     methylPREDNISolone 4 mg tablet  Commonly known as: MEDROL DOSEPACK     naproxen 500 MG tablet  Commonly known as: NAPROSYN     traMADoL 50 mg tablet  Commonly known as: ULTRAM            ASK your doctor about these medications      magic mouthwash diphen/antac/lidoc  Swish and Spit 5 mL by mouth for 30 seconds twice daily.     pregabalin 50 MG capsule  Commonly known as: LYRICA  Take 3 capsules (150 mg total) by mouth every evening.            Discharge Procedure Orders   COMMODE FOR HOME USE     Order Specific Question Answer Comments   Type: Standard    Height: 5' 1" (1.549 m)    Weight: 64 kg (141 lb)    Length of need (1-99 months): 99              "

## 2023-01-12 NOTE — PLAN OF CARE
[place] [facility]  Discharge Final Note    Primary Care Provider: Krystal Singleton DO    Expected Discharge Date: 1/12/2023    Final Discharge Note (most recent)       Final Note - 01/12/23 1327          Final Note    Assessment Type Final Discharge Note     Anticipated Discharge Disposition Home or Self Care     What phone number can be called within the next 1-3 days to see how you are doing after discharge? --   [phone]                    Important Message from Medicare      Future Appointments   Date Time Provider Department Center   1/25/2023 10:30 AM Mi Hyatt PA-C [facility] [address]THO [address]   1/31/2023 11:00 AM Isiah Moran MD [facility] RHEUM [address]   1/31/2023  2:20 PM Love Whaley MD [facility]   2/8/2023  9:40 AM Brittany Bartlett MD [facility] DERM [employer]   2/23/2023 10:30 AM Ariella Finnegan MD [facility] ORTHO [address]   2/28/2023  1:40 PM Krystal Singleton DO [facility] [place]   4/6/2023  8:45 AM Ariella Finnegan MD [facility] ORTHO [address]   6/7/2023 10:30 AM CHAIR 01 [facility]         Patient discharged home to care of family on 1/12/23.      Brigida Rojas RNCM  Case Management  [facility]  [phone]

## 2023-01-12 NOTE — PT/OT/SLP EVAL
Physical Therapy Evaluation, Treatment, and Discharge Note    Patient Name:  Joyce Peterson   MRN:  [id number]    Recommendations:     Discharge Recommendations: home  Discharge Equipment Recommendations: none   Barriers to discharge: Inaccessible home    Assessment:     Joyce Peterson is a 62 y.o. female admitted with a medical diagnosis of Pes planovalgus, acquired, left. Patient tolerated PT session well. Patient performed bed mobility with modified independence. She performed bed to wheelchair transfer and back with contact guard assistance while maintaining L LE NWB.  present and verbalized understanding of all education provided.    Recent Surgery: Procedure(s) (LRB):  FUSION, FOOT (Left)  SURGICAL PROCUREMENT, BONE GRAFT (Left)  LENGTHENING, TENDON, LOWER EXTREMITY (Left) 1 Day Post-Op    Plan:     During this hospitalization, patient does not require further acute PT services.  Please re-consult if situation changes.      Subjective     Subjective: Is doing better then she thought she would be.   Patient/Family Comments/goals: To get back to PLOF.   Pain/Comfort:  Pain Rating 1: 0/10    Patients cultural, spiritual, Rastafari conflicts given the current situation:  n/a    Living Environment:  Patient lives with her  in a [facility] with 5 steps and a left handrail to enter. Prior to admission, patients level of function was independent for functional mobility. Equipment at home: bedside commode, walker, rolling (knee scooter. transport wheelchair.). She is going to use the wheelchair for her primary mode of transportation while she is NWB. Patient had parts of her house re-done to accommodate the wheelchair. Upon discharge, patient will have assistance from  and son.    Objective:     Communicated with RN prior to session.  Patient found supine with perineural catheter (Nimbus) upon PT entry to room.    General Precautions: Standard, fall    Orthopedic Precautions:LLE non weight bearing    Braces:  (splint with ace)  Respiratory Status: Room air    Exams:  Cognitive Exam:  Patient is oriented to Person, Place, Time, and Situation  Gross Motor Coordination:  WFL  Sensation:    -       Impaired  left ankle due to nerve block  RLE ROM: WFL  RLE Strength: WFL  LLE ROM: WFL at hip and knee but did not assess ankle due to splint  LLE Strength: appears WFL at hip and knee but did not formally assess due to recent surgery     Functional Mobility:  Bed Mobility:     Supine to Sit: modified independence  Sit to Supine: modified independence  Transfers:     Sit to Stand: contact guard assistance with rolling walker   Bed to Wheelchair and Wheelchair to Bed: contact guard assistance with rolling walker on the way to wheelchair and no assistive device back to bed for squat pivot transfer while maintaining L LE NWB       Treatment and Education:  Patient and  educated in:  -PT role and POC  -safety with transfers including hand placement  -gait sequencing and RW management while maintaining NWB on L LE  -OOB activity to maximize recovery including ambulating at home to prevent DVT   -car transfer  -stair training - discussed patient sitting on her buttock and scooting up the steps     AM-PAC 6 CLICK MOBILITY  Total Score:21     Patient left supine with all lines intact, call button in reach, RN notified, and  present.    GOALS:   Multidisciplinary Problems       Physical Therapy Goals       Not on file              Multidisciplinary Problems (Resolved)          Problem: Physical Therapy    Goal Priority Disciplines Outcome Goal Variances Interventions   Physical Therapy Goal   (Resolved)     PT, PT/OT Met                         History:     Past Medical History:   Diagnosis Date    Acid reflux     Allergy     Anemia     Asthma     Coronary artery disease     CS (cervical spondylosis) 03/08/2013    Degenerative disc disease     Dry eyes     Dry mouth     Gastroparesis     History of methotrexate  therapy 01/19/2022    Hyperlipidemia     Lateral meniscus derangement 04/06/2016    Lobular carcinoma in situ     Lumbar spondylosis 03/08/2013    Osteoarthritis     Osteoporosis     Rheumatoid arthritis(714.0)     Rupture of left triceps tendon 10/17/2018    Umbilical hernia 08/13/2015       Past Surgical History:   Procedure Laterality Date    BREAST BIOPSY Left 01/29/2002    core bx    CARPAL TUNNEL RELEASE Right 05/2017    CHOLECYSTECTOMY  2004    COLONOSCOPY      10/11    COLONOSCOPY N/A 6/29/2017    Procedure: COLONOSCOPY;  Surgeon: López Moore MD;  [facility]  Service: Endoscopy;  Laterality: N/A;    EPIDURAL STEROID INJECTION N/A 11/18/2021    Procedure: INJECTION, STEROID, EPIDURAL, L5-S1 IL NEED CONSENT;  Surgeon: jT Mayfield MD;  Location: [facility] PAIN MGT;  Service: Pain Management;  Laterality: N/A;    EPIDURAL STEROID INJECTION N/A 9/29/2022    Procedure: LUMBAR L3/L4 IL MADELINE CONTRAST;  Surgeon: Tj Mayfield MD;  Location: [facility] PAIN MGT;  Service: Pain Management;  Laterality: N/A;    EPIDURAL STEROID INJECTION N/A 12/12/2022    Procedure: INJECTION, STEROID, EPIDURAL L5/S1 IL CONTRAST;  Surgeon: Tj Mayfield MD;  Location: [facility] PAIN MGT;  Service: Pain Management;  Laterality: N/A;    FOOT ARTHRODESIS Left 1/11/2023    Procedure: FUSION, FOOT;  Surgeon: Ariella Finnegan MD;  Location: [facility] OR;  Service: Orthopedics;  Laterality: Left;  nimbus    HARVESTING OF BONE GRAFT Left 1/11/2023    Procedure: SURGICAL PROCUREMENT, BONE GRAFT;  Surgeon: Ariella Finnegan MD;  Location: [facility] OR;  Service: Orthopedics;  Laterality: Left;    INJECTION OF ANESTHETIC AGENT AROUND NERVE Bilateral 8/23/2021    Procedure: BLOCK, NERVE, MEDIAL BRANCH L3,L4,L5;  Surgeon: Tj Mayfield MD;  Location: [facility] PAIN MGT;  Service: Pain Management;  Laterality: Bilateral;  1 of 2    INJECTION OF ANESTHETIC AGENT AROUND NERVE Bilateral 8/26/2021    Procedure: BLOCK, NERVE, MEDIAL BRANCH L3,L4,L5;  Surgeon: Tj  MD Tran;  Location: [facility] PAIN MGT;  Service: Pain Management;  Laterality: Bilateral;  2 of 2    INJECTION OF ANESTHETIC AGENT AROUND NERVE Left 7/7/2022    Procedure: BLOCK, NERVE, LEFT OBTURATOR AND FEMORAL;  Surgeon: Tj Mayfield MD;  Location: [facility] PAIN MGT;  Service: Pain Management;  Laterality: Left;    INJECTION OF FACET JOINT Bilateral 3/12/2020    Procedure: FACET JOINT INJECTION (LUMBAR BLOCK) BILATERAL L4-5 AND L5-S1 DIRECT REFERRAL;  Surgeon: Tj Mayfield MD;  Location: [facility] PAIN MGT;  Service: Pain Management;  Laterality: Bilateral;  NEEDS CONSENT    INJECTION OF FACET JOINT Bilateral 3/29/2021    Procedure: INJECTION, FACET JOINT L3/4, L4/5, L5/S1;  Surgeon: Tj Mayfield MD;  Location: [facility] PAIN MGT;  Service: Pain Management;  Laterality: Bilateral;    INJECTION OF JOINT Right 7/30/2020    Procedure: INJECTION, JOINT, RIGHT HIP Interarticular under flouro;  Surgeon: Tj Mayfield MD;  Location: [facility] PAIN MGT;  Service: Pain Management;  Laterality: Right;  INJECTION, JOINT, RIGHT HIP Interarticular under flouro    INJECTION OF JOINT Right 2/21/2022    Procedure: Injection, Joint RIGHT ISCHIAL BURSA;  Surgeon: Tj Mayfield MD;  Location: [facility] PAIN MGT;  Service: Pain Management;  Laterality: Right;    INTRAMEDULLARY RODDING OF FEMUR Left 5/21/2019    Procedure: INSERTION, INTRAMEDULLARY ARIEL, FEMUR;  Surgeon: López Duff MD;  Location: [address];  Service: Orthopedics;  Laterality: Left;    LENGTHENING OF TENDON OF LOWER EXTREMITY Left 1/11/2023    Procedure: LENGTHENING, TENDON, LOWER EXTREMITY;  Surgeon: Ariella Finnegan MD;  Location: [facility] OR;  Service: Orthopedics;  Laterality: Left;    RADIOFREQUENCY ABLATION Left 9/20/2021    Procedure: RADIOFREQUENCY ABLATION left L3,4,5 RFA  1 of 2  CONSENT NEEDED;  Surgeon: Tj Mayfield MD;  Location: [facility] PAIN MGT;  Service: Pain Management;  Laterality: Left;    RADIOFREQUENCY ABLATION Right 10/4/2021    Procedure: RADIOFREQUENCY ABLATION  right  L3,4,5 RFA   2 of 2  CONSENT NEEDED;  Surgeon: Tj Mayfield MD;  Location: [facility] PAIN MGT;  Service: Pain Management;  Laterality: Right;    RADIOFREQUENCY ABLATION Left 4/21/2022    Procedure: Radiofrequency Ablation LEFT L3,L4,L5;  Surgeon: Tj Mayfield MD;  Location: [facility] PAIN MGT;  Service: Pain Management;  Laterality: Left;    RADIOFREQUENCY ABLATION Right 5/12/2022    Procedure: Radiofrequency Ablation RIGHT L3,L4,L5;  Surgeon: Tj Mayfield MD;  [facility];  Service: Pain Management;  Laterality: Right;    RADIOFREQUENCY ABLATION Left 7/25/2022    Procedure: Radiofrequency Ablation Left Femoral & Obturator;  Surgeon: Tj Mayfield MD;  Location: [facility] PAIN MGT;  Service: Pain Management;  Laterality: Left;    REPAIR OF TRICEPS TENDON Left 10/17/2018    Procedure: REPAIR, TENDON, TRICEPS left elbow;  Surgeon: Staci Yarbrough MD;  Location: [facility] OR [address] [place] [address];  Service: Orthopedics;  Laterality: Left;  Anesthesia: General and regional. PRONE, k-wire , hand pan 1 and pan 2, CALL ARTHREX/Tamera notified 10-12 LO    TONSILLECTOMY      TRANSFORAMINAL EPIDURAL INJECTION OF STEROID Right 8/31/2020    Procedure: INJECTION, STEROID, EPIDURAL, TRANSFORAMINAL,  APPROACH, L3-L4 and L4-L5 need consent;  Surgeon: Tj Mayfield MD;  Location: [facility] PAIN MGT;  Service: Pain Management;  Laterality: Right;    TRANSFORAMINAL EPIDURAL INJECTION OF STEROID Bilateral 7/23/2021    Procedure: INJECTION, STEROID, EPIDURAL, TRANSFORAMINAL APPROACH L4/5;  Surgeon: Tj Mayfield MD;  Location: [facility] PAIN MGT;  Service: Pain Management;  Laterality: Bilateral;    TRIGGER POINT INJECTION N/A 7/23/2021    Procedure: INJECTION, TRIGGER POINT SCAPULAR;  Surgeon: Tj Mayfield MD;  Location: [facility] PAIN MGT;  Service: Pain Management;  Laterality: N/A;    TUBAL LIGATION  2003    UPPER GASTROINTESTINAL ENDOSCOPY      10/11    uterine ablation  2003       Time Tracking:     PT Received On: 01/12/23  PT Start Time: 1139     PT Stop  Time: 1156  PT Total Time (min): 17 min     Billable Minutes: Evaluation 8 and Therapeutic Activity 9      01/12/2023

## 2023-01-12 NOTE — ASSESSMENT & PLAN NOTE
TJ 26  SJ 2  Pt global 90  ESR <2  CRP <0.1  DAS28    4.51(moderate) ZZF30-WBQ 5.51(HDA) but all joints tender: fibromyalgia and OA) falsely elevating.  QPR17-WZQ      CDAI    *Kenalog 40mg IM  sarilumab 200mg sc q 2 wks  leflunomide 20mg daily  Naproxen 500mg twice daily prn with omeprazole 40mg daily  Prednisone 5mg daily  *Shingrix #2 after 4/14/19  RTC 3 months with standing labs       on the discharge service for the patient. I have reviewed and made amendments to the documentation where necessary.

## 2023-01-12 NOTE — PLAN OF CARE
Problem: Occupational Therapy  Goal: Occupational Therapy Goal  Description: Goals to be met by: 1/12/23     Patient will increase functional independence with ADLs by performing:    UE Dressing with Modified [place].  LE Dressing with Modified [place] and Assistive Devices as needed.  Grooming while standing at sink with Contact Guard Assistance.  Toileting from bedside commode with Contact Guard Assistance for hygiene and clothing management.   Bathing from  shower chair/bench with Contact Guard Assistance.  Toilet transfer to bedside commode with Contact Guard Assistance.    Outcome: Ongoing, Progressing

## 2023-01-12 NOTE — PLAN OF CARE
Patient tolerated PT session well. Patient performed bed mobility with modified independence. She performed bed to wheelchair transfer and back with contact guard assistance while maintaining L LE NWB.  present and verbalized understanding of all education provided.    Problem: Physical Therapy  Goal: Physical Therapy Goal  Outcome: Met

## 2023-01-13 ENCOUNTER — TELEPHONE (OUTPATIENT)
Dept: ORTHOPEDICS | Facility: CLINIC | Age: 63
End: 2023-01-13
Payer: MEDICARE

## 2023-01-13 ENCOUNTER — PATIENT MESSAGE (OUTPATIENT)
Dept: ORTHOPEDICS | Facility: CLINIC | Age: 63
End: 2023-01-13
Payer: MEDICARE

## 2023-01-13 NOTE — ANESTHESIA POSTPROCEDURE EVALUATION
Anesthesia Post Evaluation    Patient: Joyce Peterson    Procedure(s) Performed: Procedure(s) (LRB):  FUSION, FOOT (Left)  SURGICAL PROCUREMENT, BONE GRAFT (Left)  LENGTHENING, TENDON, LOWER EXTREMITY (Left)    Final Anesthesia Type: general      Patient location during evaluation: PACU  Patient participation: Yes- Able to Participate  Level of consciousness: awake and alert and oriented  Post-procedure vital signs: reviewed and stable  Pain management: adequate  Airway patency: patent    PONV status at discharge: No PONV  Anesthetic complications: no      Cardiovascular status: hemodynamically stable  Respiratory status: nasal cannula  Hydration status: euvolemic  Follow-up not needed.          Vitals Value Taken Time   /66 01/12/23 1237   Temp 37 °C (98.6 °F) 01/12/23 1237   Pulse 83 01/12/23 1237   Resp 18 01/12/23 1237   SpO2 97 % 01/12/23 1237         Event Time   Out of Recovery 15:30:00         Pain/Isacc Score: Pain Rating Prior to Med Admin: 2 (1/12/2023  8:11 AM)  Pain Rating Post Med Admin: 3 (1/12/2023  4:00 AM)  Isacc Score: 9 (1/11/2023  4:53 PM)

## 2023-01-13 NOTE — TELEPHONE ENCOUNTER
Patient has received bedside commode.     Discussed postop pain management with multimodal approach.     Patient pleasant.    Jason Arreguin MS, OTC   Sports Medicine Assistant   [facility]  (P) [phone]  (F) [phone]

## 2023-01-14 ENCOUNTER — PATIENT MESSAGE (OUTPATIENT)
Dept: ORTHOPEDICS | Facility: CLINIC | Age: 63
End: 2023-01-14
Payer: MEDICARE

## 2023-01-16 ENCOUNTER — PATIENT MESSAGE (OUTPATIENT)
Dept: ORTHOPEDICS | Facility: CLINIC | Age: 63
End: 2023-01-16
Payer: MEDICARE

## 2023-01-16 NOTE — ANESTHESIA POST-OP PAIN MANAGEMENT
Acute Pain Service Progress Note    01/16/2023    I called Joyce Peterson in regards to the PNC and Nimbus pump.  Patient denies any symptoms of LAST.  Dressing clean, dry and intact.  No erythema swelling at the insertion site.  Reiterated that the PNC is to be removed today.  I encouraged them to call should they have any questions or concerns.    Ambrose Finnegan  Regional Anesthesiology and Acute Pain Medicine  [facility]

## 2023-01-17 ENCOUNTER — PATIENT MESSAGE (OUTPATIENT)
Dept: ORTHOPEDICS | Facility: CLINIC | Age: 63
End: 2023-01-17
Payer: MEDICARE

## 2023-01-17 RX ORDER — OXYCODONE HYDROCHLORIDE 5 MG/1
5 TABLET ORAL EVERY 4 HOURS PRN
Qty: 30 TABLET | Refills: 0 | Status: SHIPPED | OUTPATIENT
Start: 2023-01-17 | End: 2023-01-18 | Stop reason: SDUPTHER

## 2023-01-17 RX ORDER — ACETAMINOPHEN 500 MG
1000 TABLET ORAL 3 TIMES DAILY
Qty: 42 TABLET | Refills: 0 | Status: SHIPPED | OUTPATIENT
Start: 2023-01-17 | End: 2023-01-25 | Stop reason: SDUPTHER

## 2023-01-17 RX ORDER — ACETAMINOPHEN 500 MG
1000 TABLET ORAL 3 TIMES DAILY
Qty: 42 TABLET | Refills: 0 | Status: SHIPPED | OUTPATIENT
Start: 2023-01-17 | End: 2023-01-17 | Stop reason: SDUPTHER

## 2023-01-17 RX ORDER — OXYCODONE HYDROCHLORIDE 5 MG/1
5 TABLET ORAL EVERY 4 HOURS PRN
Qty: 28 TABLET | Refills: 0 | Status: SHIPPED | OUTPATIENT
Start: 2023-01-17 | End: 2023-01-17 | Stop reason: SDUPTHER

## 2023-01-18 DIAGNOSIS — R52 PAIN: Primary | ICD-10-CM

## 2023-01-18 RX ORDER — OXYCODONE HYDROCHLORIDE 5 MG/1
5 TABLET ORAL EVERY 4 HOURS PRN
Qty: 30 TABLET | Refills: 0 | Status: SHIPPED | OUTPATIENT
Start: 2023-01-18 | End: 2023-01-25 | Stop reason: SDUPTHER

## 2023-01-19 ENCOUNTER — PATIENT MESSAGE (OUTPATIENT)
Dept: RHEUMATOLOGY | Facility: CLINIC | Age: 63
End: 2023-01-19
Payer: MEDICARE

## 2023-01-25 ENCOUNTER — HOSPITAL ENCOUNTER (OUTPATIENT)
Dept: RADIOLOGY | Facility: HOSPITAL | Age: 63
Discharge: HOME | End: 2023-01-25
Attending: PHYSICIAN ASSISTANT
Payer: MEDICARE

## 2023-01-25 ENCOUNTER — OFFICE VISIT (OUTPATIENT)
Dept: ORTHOPEDICS | Facility: CLINIC | Age: 63
End: 2023-01-25
Payer: MEDICARE

## 2023-01-25 ENCOUNTER — PATIENT MESSAGE (OUTPATIENT)
Dept: RHEUMATOLOGY | Facility: CLINIC | Age: 63
End: 2023-01-25
Payer: MEDICARE

## 2023-01-25 ENCOUNTER — TELEPHONE (OUTPATIENT)
Dept: RHEUMATOLOGY | Facility: CLINIC | Age: 63
End: 2023-01-25
Payer: MEDICARE

## 2023-01-25 VITALS — BODY MASS INDEX: 26.65 KG/M2 | WEIGHT: 141.13 LBS | HEIGHT: 61 IN

## 2023-01-25 DIAGNOSIS — M79.2 NEUROPATHIC PAIN OF LEFT FOOT: ICD-10-CM

## 2023-01-25 DIAGNOSIS — M19.072 DEGENERATIVE JOINT DISEASE OF HINDFOOT, LEFT: ICD-10-CM

## 2023-01-25 DIAGNOSIS — M06.9 RHEUMATOID ARTHRITIS, INVOLVING UNSPECIFIED SITE, UNSPECIFIED WHETHER RHEUMATOID FACTOR PRESENT: Primary | ICD-10-CM

## 2023-01-25 DIAGNOSIS — M21.42 PES PLANOVALGUS, ACQUIRED, LEFT: Primary | ICD-10-CM

## 2023-01-25 DIAGNOSIS — M21.42 PES PLANOVALGUS, ACQUIRED, LEFT: ICD-10-CM

## 2023-01-25 DIAGNOSIS — R74.01 ELEVATED TRANSAMINASE LEVEL: Primary | ICD-10-CM

## 2023-01-25 PROCEDURE — 1160F PR REVIEW ALL MEDS BY PRESCRIBER/CLIN PHARMACIST DOCUMENTED: ICD-10-PCS | Mod: CPTII,S$GLB,, | Performed by: PHYSICIAN ASSISTANT

## 2023-01-25 PROCEDURE — 73630 X-RAY EXAM OF FOOT: CPT | Mod: 26,LT,, | Performed by: RADIOLOGY

## 2023-01-25 PROCEDURE — 73630 XR FOOT COMPLETE 3 VIEW LEFT: ICD-10-PCS | Mod: 26,LT,, | Performed by: RADIOLOGY

## 2023-01-25 PROCEDURE — 99999 PR PBB SHADOW E&M-EST. PATIENT-LVL IV: ICD-10-PCS | Mod: PBBFAC,,, | Performed by: PHYSICIAN ASSISTANT

## 2023-01-25 PROCEDURE — 73630 X-RAY EXAM OF FOOT: CPT | Mod: TC,LT

## 2023-01-25 PROCEDURE — 99024 PR POST-OP FOLLOW-UP VISIT: ICD-10-PCS | Mod: S$GLB,,, | Performed by: PHYSICIAN ASSISTANT

## 2023-01-25 PROCEDURE — 3008F PR BODY MASS INDEX (BMI) DOCUMENTED: ICD-10-PCS | Mod: CPTII,S$GLB,, | Performed by: PHYSICIAN ASSISTANT

## 2023-01-25 PROCEDURE — 1160F RVW MEDS BY RX/DR IN RCRD: CPT | Mod: CPTII,S$GLB,, | Performed by: PHYSICIAN ASSISTANT

## 2023-01-25 PROCEDURE — 99999 PR PBB SHADOW E&M-EST. PATIENT-LVL IV: CPT | Mod: PBBFAC,,, | Performed by: PHYSICIAN ASSISTANT

## 2023-01-25 PROCEDURE — 1159F MED LIST DOCD IN RCRD: CPT | Mod: CPTII,S$GLB,, | Performed by: PHYSICIAN ASSISTANT

## 2023-01-25 PROCEDURE — 3008F BODY MASS INDEX DOCD: CPT | Mod: CPTII,S$GLB,, | Performed by: PHYSICIAN ASSISTANT

## 2023-01-25 PROCEDURE — 99024 POSTOP FOLLOW-UP VISIT: CPT | Mod: S$GLB,,, | Performed by: PHYSICIAN ASSISTANT

## 2023-01-25 PROCEDURE — 1159F PR MEDICATION LIST DOCUMENTED IN MEDICAL RECORD: ICD-10-PCS | Mod: CPTII,S$GLB,, | Performed by: PHYSICIAN ASSISTANT

## 2023-01-25 RX ORDER — METHOCARBAMOL 750 MG/1
750 TABLET, FILM COATED ORAL 3 TIMES DAILY PRN
Qty: 42 TABLET | Refills: 0 | Status: SHIPPED | OUTPATIENT
Start: 2023-01-25 | End: 2023-02-10 | Stop reason: SDUPTHER

## 2023-01-25 RX ORDER — OXYCODONE HYDROCHLORIDE 5 MG/1
5 TABLET ORAL EVERY 4 HOURS PRN
Qty: 30 TABLET | Refills: 0 | Status: SHIPPED | OUTPATIENT
Start: 2023-01-25 | End: 2023-02-10

## 2023-01-25 RX ORDER — ACETAMINOPHEN 500 MG
1000 TABLET ORAL 3 TIMES DAILY
Qty: 84 TABLET | Refills: 0 | Status: SHIPPED | OUTPATIENT
Start: 2023-01-25 | End: 2023-02-09

## 2023-01-25 NOTE — PROGRESS NOTES
Postoperative Visit  DOS 1/11/23    History of Present Illness:   Joyce Peterson is s/p left mid-tarsal multi arthrodesis (naviculo-cuneiform, 1st tarsometatarsal), Left triple arthrodesis, Left calcaneal bone graft harvest, Left tendoachilles lengthening who comes to the office for 2 weeks post op evaluation. Her pain is well controlled and she is taking less of her pain medication.  She has burning shooting pains from her upper thigh into her foot- worse at night.  She has increased Lyrica- managed by Dr. Moran. The patient is wearing the splint  in a compliant fashion.She has been compliant with weight bearing restrictions and elevation instructions.  She denies fevers or chills.       Physical Examination:  She is alert, awake and oriented to time, place and person. She does not appear to be in any distress, and denies any constitutional symptoms. Examination of the left foot demonstrates healing incisions.  Incisions are clean and dry.  There is mild appropriate post op swelling.    X-rays of the left foot, personally reviewed by me, demonstrate satisfactory hardware position.     Assessment/Plan:  2 weeks s/p left foot triple arthrodesis  Sutures were removed today and steri-strips applied  Remain NWB x 6 weeks  Refills provided today- oxycodone, robaxin, tylenol  Tall CAM boot today  Physical therapy prescription placed - to start at 2 months post op  Follow up in 4 weeks with Dr. Finnegan    All questions were answered in detail. The patient is in full agreement with the treatment plan and will proceed accordingly.

## 2023-01-30 ENCOUNTER — SPECIALTY PHARMACY (OUTPATIENT)
Dept: PHARMACY | Facility: CLINIC | Age: 63
End: 2023-01-30
Payer: MEDICARE

## 2023-01-30 NOTE — TELEPHONE ENCOUNTER
Specialty Pharmacy - Refill Coordination    Specialty Medication Orders Linked to Encounter      Flowsheet Row Most Recent Value   Medication #1 sarilumab (KEVZARA) 200 mg/1.14 mL Syrg (Order#[id number], Rx#[id number])            Refill Questions - Documented Responses      Flowsheet Row Most Recent Value   Patient Availability and HIPAA Verification    Does patient want to proceed with activity? Yes   HIPAA/medical authority confirmed? Yes   Relationship to patient of person spoken to? Self   Refill Screening Questions    Changes to allergies? No   Changes to medications? No   New conditions since last clinic visit? No   Unplanned office visit, urgent care, ED, or hospital admission in the last 4 weeks? No   How does patient/caregiver feel medication is working? Good   Financial problems or insurance changes? No   How many doses of your specialty medications were missed in the last 4 weeks? 0   Would patient like to speak to a pharmacist? No   When does the patient need to receive the medication? 02/05/23   Refill Delivery Questions    How will the patient receive the medication? MEDRx   When does the patient need to receive the medication? 02/05/23   Shipping Address Home   Address in [facility] confirmed and updated if neccessary? Yes   Expected Copay ($) 80   Is the patient able to afford the medication copay? Yes   Payment Method CC on file   Days supply of Refill 28   Supplies needed? No supplies needed   Refill activity completed? Yes   Refill activity plan Refill scheduled   Shipment/Pickup Date: 02/01/23            Current Outpatient Medications   Medication Sig    acetaminophen (TYLENOL) 500 MG tablet Take 2 tablets (1,000 mg total) by mouth 3 (three) times daily for 14 days    albuterol (PROVENTIL/VENTOLIN HFA) 90 mcg/actuation inhaler INHALE 2 PUFFS INTO THE LUNGS EVERY 6 (SIX) HOURS AS NEEDED. RESCUE    albuterol-ipratropium (DUO-NEB) 2.5 mg-0.5 mg/3 mL nebulizer solution Take 3 mLs by  nebulization every 6 (six) hours as needed for Wheezing. Rescue    ALPRAZolam (XANAX) 0.5 MG tablet Take Once on call to procedure    aspirin 325 MG tablet Take 1 tablet (325 mg total) by mouth once daily.    budesonide-formoterol 160-4.5 mcg (SYMBICORT) 160-4.5 mcg/actuation HFAA INHALE 2 PUFFS INTO THE LUNGS EVERY 12 (TWELVE) HOURS.    calcium citrate-vitamin D3 315-200 mg (CITRACAL+D) 315 mg-5 mcg (200 unit) per tablet Take 1 tablet by mouth 2 (two) times daily.     cycloSPORINE (RESTASIS) 0.05 % ophthalmic emulsion Instill one drop into both eyes two times per day    diazePAM (VALIUM) 10 MG Tab Take 1 tablet (10 mg total) by mouth once as needed.    diclofenac sodium (VOLTAREN) 1 % Gel APPLY 2 GRAMS TOPICALLY 4 (FOUR) TIMES DAILY AS NEEDED.    estrogens, conjugated, (PREMARIN) 0.625 MG tablet take 1 tablet by mouth daily    halobetasol propion-tazarotene (DUOBRII) 0.01-0.045 % Lotn aaa on feet and hands qhs  then vaseline and warm towel (Patient taking differently: aaa on feet and hands qhs  then vaseline and warm towel)    INV PROPULSID 10 MG Take 2 tablets (20 mg) by mouth 4 (four) times daily. FOR INVESTIGATIONAL USE ONLY. Protocol [id number]  Subject ID: [id number].    ketoconazole (NIZORAL) 2 % cream Apply to feet twice daily for 3 weeks (Patient taking differently: Apply to feet twice daily for 3 weeks)    leflunomide (ARAVA) 20 MG Tab TAKE 1 TABLET BY MOUTH EVERY DAY    lifitegrast (XIIDRA) 5 % Dpet INSTILL ONE DROP INTO BOTH EYES TWICE A DAY    magic mouthwash diphen/antac/lidoc Swish and Spit 5 mL by mouth for 30 seconds twice daily.    methenamine (HIPREX) 1 gram Tab Take 1 tablet (1 g total) by mouth 2 (two) times daily.    methocarbamoL (ROBAXIN) 750 MG Tab Take 1 tablet (750 mg total) by mouth 3 (three) times daily as needed (muscle spasm).    mirabegron (MYRBETRIQ) 25 mg Tb24 ER tablet Take 1 tablet (25 mg total) by mouth once daily.    montelukast (SINGULAIR) 10 mg tablet TAKE 1 TABLET BY MOUTH EVERY  DAY AT NIGHT    omeprazole (PRILOSEC) 40 MG capsule Take 1 capsule (40 mg total) by mouth every morning.    omeprazole-sodium bicarbonate (ZEGERID) 40-1.1 mg-gram per capsule TAKE 1 CAPSULE BY MOUTH EVERY DAY IN THE MORNING    oxyCODONE (ROXICODONE) 5 MG immediate release tablet Take 1 tablet (5 mg total) by mouth every 4 (four) hours as needed for Pain.    POTASSIUM ORAL Take by mouth once daily.    predniSONE (DELTASONE) 1 MG tablet Take 5 tablets (5 mg total) by mouth once daily.    predniSONE (DELTASONE) 5 MG tablet TAKE 1 TABLET BY MOUTH EVERY DAY    pregabalin (LYRICA) 50 MG capsule Take 3 capsules (150 mg total) by mouth every evening. (Patient taking differently: Take 100 mg by mouth every evening.)    PREMARIN vaginal cream PLACE 0.5 GRAM VAGINALLY 3 (THREE) TIMES A WEEK.    progesterone (PROMETRIUM) 100 MG capsule Take 1 capsule by mouth daily.    progesterone (PROMETRIUM) 100 MG capsule take 1 capsule by mouth daily    promethazine (PHENERGAN) 25 MG tablet Take 1 tablet (25 mg total) by mouth every 6 (six) hours as needed for Nausea.    promethazine (PHENERGAN) 25 MG tablet Take 1 tablet (25 mg total) by mouth every 6 (six) hours as needed for Nausea.    rosuvastatin (CRESTOR) 20 MG tablet Take 1 tablet (20 mg total) by mouth every evening.    sarilumab (KEVZARA) 200 mg/1.14 mL Syrg Inject 1.14 mL (200 mg total) into the skin every 14 (fourteen) days.    sucralfate (CARAFATE) 1 gram tablet TAKE 1 TABLET (1 G TOTAL) BY MOUTH 4 (FOUR) TIMES DAILY.    sucralfate (CARAFATE) 1 gram tablet Take 1 tablet (1 g total) by mouth 4 (four) times daily.   Last reviewed on 1/25/2023 10:51 AM by Mi Hyatt PA-C    Review of patient's allergies indicates:   Allergen Reactions    Actemra [tocilizumab] Other (See Comments)     Severe dizziness    Codeine Nausea And Vomiting and Hives    Gold au 198 Hives and Rash    Hydroxychloroquine Other (See Comments)     Can't remember the reaction      Iodinated contrast media  "Other (See Comments)     Other reaction(s): BURNING ALL OVER    Iodine Other (See Comments) and Hives     Other reaction(s): BURNING ALL OVER - Iodine dye - Not topical    Ondansetron Nausea And Vomiting    Sulfa (sulfonamide antibiotics) Other (See Comments)     Can't remember the reaction    Zofran [ondansetron hcl (pf)] Nausea And Vomiting     Pt reports that last time she received zofran she started vomiting again    Methotrexate analogues Nausea Only    Pneumococcal vaccine Other (See Comments)    Pneumovax 23 [pneumococcal 23-argenis ps vaccine] Other (See Comments)     "sick"    Methotrexate Nausea Only    Last reviewed on  1/25/2023 10:51 AM by Mi Hyatt      Tasks added this encounter   2/26/2023 - Refill Call (Auto Added)   Tasks due within next 3 months   3/4/2023 - Clinical - Follow Up Assesement (Annual)     [facility]  [address]  [facility] [address]  Phone: [phone]  Fax: [phone]        "

## 2023-01-31 ENCOUNTER — PATIENT MESSAGE (OUTPATIENT)
Dept: ORTHOPEDICS | Facility: CLINIC | Age: 63
End: 2023-01-31
Payer: MEDICARE

## 2023-01-31 ENCOUNTER — OFFICE VISIT (OUTPATIENT)
Dept: RHEUMATOLOGY | Facility: CLINIC | Age: 63
End: 2023-01-31
Payer: MEDICARE

## 2023-01-31 VITALS — BODY MASS INDEX: 25.68 KG/M2 | HEIGHT: 61 IN | WEIGHT: 136 LBS

## 2023-01-31 DIAGNOSIS — M06.9 RHEUMATOID ARTHRITIS, INVOLVING UNSPECIFIED SITE, UNSPECIFIED WHETHER RHEUMATOID FACTOR PRESENT: Primary | ICD-10-CM

## 2023-01-31 DIAGNOSIS — M79.7 FIBROMYALGIA: ICD-10-CM

## 2023-01-31 DIAGNOSIS — M17.11 OSTEOARTHRITIS OF RIGHT KNEE, UNSPECIFIED OSTEOARTHRITIS TYPE: ICD-10-CM

## 2023-01-31 DIAGNOSIS — M06.9 RHEUMATOID ARTHRITIS: ICD-10-CM

## 2023-01-31 DIAGNOSIS — R74.01 ELEVATED TRANSAMINASE LEVEL: ICD-10-CM

## 2023-01-31 PROCEDURE — 3008F BODY MASS INDEX DOCD: CPT | Mod: CPTII,95,, | Performed by: INTERNAL MEDICINE

## 2023-01-31 PROCEDURE — 99213 OFFICE O/P EST LOW 20 MIN: CPT | Mod: 95,,, | Performed by: INTERNAL MEDICINE

## 2023-01-31 PROCEDURE — 3008F PR BODY MASS INDEX (BMI) DOCUMENTED: ICD-10-PCS | Mod: CPTII,95,, | Performed by: INTERNAL MEDICINE

## 2023-01-31 PROCEDURE — 99213 PR OFFICE/OUTPT VISIT, EST, LEVL III, 20-29 MIN: ICD-10-PCS | Mod: 95,,, | Performed by: INTERNAL MEDICINE

## 2023-01-31 PROCEDURE — 1159F PR MEDICATION LIST DOCUMENTED IN MEDICAL RECORD: ICD-10-PCS | Mod: CPTII,95,, | Performed by: INTERNAL MEDICINE

## 2023-01-31 PROCEDURE — 1159F MED LIST DOCD IN RCRD: CPT | Mod: CPTII,95,, | Performed by: INTERNAL MEDICINE

## 2023-01-31 RX ORDER — LEFLUNOMIDE 20 MG/1
20 TABLET ORAL DAILY
Qty: 90 TABLET | Refills: 0 | Status: SHIPPED | OUTPATIENT
Start: 2023-01-31 | End: 2023-06-12

## 2023-01-31 RX ORDER — SARILUMAB 200 MG/1.14ML
200 INJECTION, SOLUTION SUBCUTANEOUS
Qty: 2.28 ML | Refills: 2 | Status: SHIPPED | OUTPATIENT
Start: 2023-01-31 | End: 2023-05-11 | Stop reason: SDUPTHER

## 2023-01-31 RX ORDER — PREDNISONE 5 MG/1
5 TABLET ORAL DAILY
Qty: 90 TABLET | Refills: 1 | Status: ON HOLD | OUTPATIENT
Start: 2023-01-31 | End: 2023-06-28 | Stop reason: HOSPADM

## 2023-01-31 NOTE — PROGRESS NOTES
"Subjective:       Patient ID: Joyce Peterson is a 62 y.o. female.    The patient location is: home  The chief complaint leading to consultation is: RA; Fibromyalgia, Osteoporosis, OA    Visit type: audiovisual    Face to Face time with patient: 25  30 minutes of total time spent on the encounter, which includes face to face time and non-face to face time preparing to see the patient (eg, review of tests), Obtaining and/or reviewing separately obtained history, Documenting clinical information in the electronic or other health record, Independently interpreting results (not separately reported) and communicating results to the patient/family/caregiver, or Care coordination (not separately reported).         Each patient to whom he or she provides medical services by telemedicine is:  (1) informed of the relationship between the physician and patient and the respective role of any other health care provider with respect to management of the patient; and (2) notified that he or she may decline to receive medical services by telemedicine and may withdraw from such care at any time.    Notes:      [facility] 10/6/22, since this time patient had Left midtarsal and multilevel arthrodesis 1/11/23. Prior to this operation, patient had to abstain from naproxen, kevzara, and leflunomide per ortho; however remained on prednisone 5mg. During this time, reports worsening of her joints globally, states that right knee was most painful. She was seen by orthopedics for right knee, she does have preserved cartilage on XRAY. She is considering plan to initiate synvisc injections. Today she states that she has been back on medications for 1 week now and has had significant relief since resuming medications. Still c/o shoulder stiffness with swelling R>L, continues to have soreness and difficulty with sleep secondary to dull ache of shoulders. Trialed lyrica 150mg for 2 weeks with increased "grogginess" during the daytime, unable to " "tolerate, now taking lyrica 100mg without any daytime grogginess. Patient had prolia injection 11/30/22 scheduled for 6/7/23. Has adequate dietary calcium, eats yogurt, cheeses daily. Continues to reports taking citracal daily.      HPI  Review of Systems   Constitutional:  Negative for fever and unexpected weight change.   HENT:  Negative for mouth sores and trouble swallowing.    Eyes:  Negative for redness.   Respiratory:  Negative for cough and shortness of breath.    Cardiovascular:  Negative for chest pain.   Gastrointestinal:  Negative for constipation and diarrhea.   Genitourinary:  Negative for dysuria and genital sores.   Skin:  Negative for rash.   Neurological:  Negative for headaches.   Hematological:  Does not bruise/bleed easily.       Objective:   Ht 5' 1" (1.549 m)   Wt 61.7 kg (136 lb)   LMP  (LMP Unknown)   BMI 25.70 kg/m²      Physical Exam limited secondary to TeleHealth Visit. Following performed via video feed and patient assistance.   Physical Exam   Constitutional: She is oriented to person, place, and time. normal appearance.   HENT:   Head: Normocephalic.   Eyes: Pupils are equal, round, and reactive to light. Conjunctivae are normal.   Pulmonary/Chest: Effort normal.   Musculoskeletal:      Right shoulder: Tenderness present.      Left shoulder: Tenderness present.      Right elbow: Normal.      Left elbow: Normal.      Right wrist: Normal.      Left wrist: Normal.      Right knee: Tenderness present.      Left knee: Normal.   Neurological: She is alert and oriented to person, place, and time.   Psychiatric: Her behavior is normal. Mood and thought content normal.       Right Side Rheumatological Exam     Examination finds the elbow and wrist normal.    The patient is tender to palpation of the shoulder and knee    The patient has an enlarged 1st MCP, 2nd MCP, 3rd MCP, 4th MCP and 5th MCP    Left Side Rheumatological Exam     Examination finds the elbow, wrist and knee normal.    The " patient is tender to palpation of the shoulder.    The patient has an enlarged 1st MCP, 2nd MCP, 3rd MCP, 4th MCP and 5th MCP.          8/23/2021 12/9/2021 6/23/2022 10/6/2022   Tender (GURROLA-28) 2 / 28 24 / 28 1 / 28 1 / 28    Swollen (GURROLA-28) 6 / 28 0 / 28 0 / 28 0 / 28    Provider Global 10 mm 0 mm 10 mm 10 mm   Patient Global 80 mm 90 mm 90 mm 75 mm   ESR 1 mm/hr 1 mm/hr 1 mm/hr 2 mm/hr   CRP 0.1 mg/L 0.3 mg/L 0.3 mg/L 0.3 mg/L   GURROLA-28 (ESR) 2.6 (Low disease activity) 4 (Moderate disease activity) 1.82 (Remission) 2.1 (Remission)   GURROLA-28 (CRP) 3.59 (Moderate disease activity) 5.06 (Moderate disease activity) 2.87 (Low disease activity) 2.66 (Low disease activity)   CDAI Score 17  33  11  9.5         Lab Visit on 01/25/2023   Component Date Value Ref Range Status    Sed Rate 01/25/2023 11  0 - 36 mm/Hr Final    CRP 01/25/2023 1.3  0.0 - 8.2 mg/L Final    WBC 01/25/2023 7.29  3.90 - 12.70 K/uL Final    RBC 01/25/2023 4.05  4.00 - 5.40 M/uL Final    Hemoglobin 01/25/2023 11.8 (L)  12.0 - 16.0 g/dL Final    Hematocrit 01/25/2023 36.7 (L)  37.0 - 48.5 % Final    MCV 01/25/2023 91  82 - 98 fL Final    MCH 01/25/2023 29.1  27.0 - 31.0 pg Final    MCHC 01/25/2023 32.2  32.0 - 36.0 g/dL Final    RDW 01/25/2023 13.6  11.5 - 14.5 % Final    Platelets 01/25/2023 380  150 - 450 K/uL Final    MPV 01/25/2023 10.6  9.2 - 12.9 fL Final    Immature Granulocytes 01/25/2023 0.3  0.0 - 0.5 % Final    Gran # (ANC) 01/25/2023 5.5  1.8 - 7.7 K/uL Final    Immature Grans (Abs) 01/25/2023 0.02  0.00 - 0.04 K/uL Final    Comment: Mild elevation in immature granulocytes is non specific and   can be seen in a variety of conditions including stress response,   acute inflammation, trauma and pregnancy. Correlation with other   laboratory and clinical findings is essential.      Lymph # 01/25/2023 0.9 (L)  1.0 - 4.8 K/uL Final    Mono # 01/25/2023 0.7  0.3 - 1.0 K/uL Final    Eos # 01/25/2023 0.0  0.0 - 0.5 K/uL Final    Baso #  01/25/2023 0.07  0.00 - 0.20 K/uL Final    nRBC 01/25/2023 0  0 /100 WBC Final    Gran % 01/25/2023 75.8 (H)  38.0 - 73.0 % Final    Lymph % 01/25/2023 12.6 (L)  18.0 - 48.0 % Final    Mono % 01/25/2023 10.2  4.0 - 15.0 % Final    Eosinophil % 01/25/2023 0.1  0.0 - 8.0 % Final    Basophil % 01/25/2023 1.0  0.0 - 1.9 % Final    Differential Method 01/25/2023 Automated   Final    Sodium 01/25/2023 139  136 - 145 mmol/L Final    Potassium 01/25/2023 4.4  3.5 - 5.1 mmol/L Final    Chloride 01/25/2023 106  95 - 110 mmol/L Final    CO2 01/25/2023 25  23 - 29 mmol/L Final    Glucose 01/25/2023 96  70 - 110 mg/dL Final    BUN 01/25/2023 13  8 - 23 mg/dL Final    Creatinine 01/25/2023 0.8  0.5 - 1.4 mg/dL Final    Calcium 01/25/2023 9.0  8.7 - 10.5 mg/dL Final    Total Protein 01/25/2023 6.6  6.0 - 8.4 g/dL Final    Albumin 01/25/2023 3.7  3.5 - 5.2 g/dL Final    Total Bilirubin 01/25/2023 0.4  0.1 - 1.0 mg/dL Final    Comment: For infants and newborns, interpretation of results should be based  on gestational age, weight and in agreement with clinical  observations.    Premature Infant recommended reference ranges:  Up to 24 hours.............<8.0 mg/dL  Up to 48 hours............<12.0 mg/dL  3-5 days..................<15.0 mg/dL  6-29 days.................<15.0 mg/dL      Alkaline Phosphatase 01/25/2023 281 (H)  55 - 135 U/L Final    AST 01/25/2023 59 (H)  10 - 40 U/L Final    ALT 01/25/2023 70 (H)  10 - 44 U/L Final    Anion Gap 01/25/2023 8  8 - 16 mmol/L Final    eGFR 01/25/2023 >60.0  >60 mL/min/1.73 m^2 Final    GGT 01/25/2023 603 (H)  8 - 55 U/L Final    CPK 01/25/2023 68  20 - 180 U/L Final      Assessment:       RA  TJ 1  SJ 0  Pt global 75 ESR 11 CRP 1.3 DAS28 (remission)  RMX42-JNH 2.66 (DMA)  CDAI) 9.5 (LDA) but all driven by low back pain, neck pain, fibromyalgia, right shoulder pain, OA right knee  Rheumatoid Arthritis Treatment Goals:  -Disease Activity Measure:CDAI   -Target: LDA  -Patient  Goal:  -Therapy/Change: none, activity scores driven by back symptoms, neck symptoms, fibromyalgia , right shoulder, OA right knee, not RA. No need to change sarilumab to upadacitinib  -Shared Decision Making: Discussed goals of care and therapy plan together with patient as outlined above.      Osteoporosis DXA 3/8/22: normal with FRAX : hip 1.6% major 23%  : completed 2 years of teriparatide added to denosumab, now denosumab alone  Fibromyalgia;   OA      Plan:       Problem List Items Addressed This Visit          Orthopedic    Fibromyalgia     Other Visit Diagnoses       Rheumatoid arthritis, involving unspecified site, unspecified whether rheumatoid factor present    -  Primary    Elevated transaminase level        Osteoarthritis of right knee, unspecified osteoarthritis type              Repeat CMP; GGT scheduled for 2/8/23  Pregabalin 100mg nightly   sarilumab 200 mg sc q 2 wks  leflunomide 20mg daily  prednisone  5 mg daily   continue yoga on [employer]   Rodrigo Chi for fibromyalgia and OA knees and hips  Aerobic exercises stationary bike  F/u Pain Management for neck and back  Continue denosumab 60 mg sc q 6 months next 6/7/23  1200 mg dietary calcium daily   RTC 3 months with standing labs, lipid panel, vitamin D

## 2023-01-31 NOTE — PATIENT INSTRUCTIONS
Repeat CMP; GGT scheduled for 2/8/23  Pregabalin 100mg nightly   sarilumab 200 mg sc q 2 wks  leflunomide 20mg daily  prednisone  5 mg daily   continue yoga on You Tube   Rodrigo Chi for fibromyalgia and OA knees and hips  Aerobic exercises stationary bike  F/u Pain Management for neck and back  Continue denosumab 60 mg sc q 6 months next 6/7/23  1200 mg dietary calcium daily   RTC 3 months with standing labs, lipid panel, vitamin D

## 2023-01-31 NOTE — PROGRESS NOTES
I have personally taken the history and examined the patient and agree with the resident,s note as stated above        The patient location is: home  The chief complaint leading to consultation is: RA; fibromyalgia; OA    Visit type: audiovisual    Face to Face time with patient: 20 min  30  minutes of total time spent on the encounter, which includes face to face time and non-face to face time preparing to see the patient (eg, review of tests), Obtaining and/or reviewing separately obtained history, Documenting clinical information in the electronic or other health record, Independently interpreting results (not separately reported) and communicating results to the patient/family/caregiver, or Care coordination (not separately reported).         Each patient to whom he or she provides medical services by telemedicine is:  (1) informed of the relationship between the physician and patient and the respective role of any other health care provider with respect to management of the patient; and (2) notified that he or she may decline to receive medical services by telemedicine and may withdraw from such care at any time.    Notes:              Date of Procedure: 1/11/2023      Surgeon: Ariella Finnegan MD     Assistant(s): Turner Kinsey MD - resident assisting  SMA Kyle - assisting      Procedure:   Left mid-tarsal multi arthrodesis (naviculo-cuneiform, 1st tarsometatarsal)  Left triple arthrodesis  Left calcaneal bone graft harvest  Left tendoachilles lengthening     Pre-Operative Diagnosis: Pes planovalgus, acquired, left [M21.42]   Degenerative joint disease of hindfoot, left [M19.072]  Equinus contracture of left ankle [M24.572]     Post-Operative Diagnosis: Same   Latest Reference Range & Units 10/03/22 08:49   Cholesterol 120 - 199 mg/dL 183   HDL 40 - 75 mg/dL 76 (H)   HDL/Cholesterol Ratio 20.0 - 50.0 % 41.5   LDL Cholesterol External 63.0 - 159.0 mg/dL 81.6   Non-HDL Cholesterol mg/dL 107   Total  Cholesterol/HDL Ratio 2.0 - 5.0  2.4   Triglycerides 30 - 150 mg/dL 127   (H): Data is abnormally high  The 10-year ASCVD risk score (Jefferson OLIVER, et al., 2019) is: 2.9%    Values used to calculate the score:      Age: 62 years      Sex: Female      Is Non- : No      Diabetic: No      Tobacco smoker: No      Systolic Blood Pressure: 122 mmHg      Is BP treated: No      HDL Cholesterol: 76 mg/dL      Total Cholesterol: 183 mg/dL      Latest Reference Range & Units 10/18/22 11:33   Vit D, 25-Hydroxy 30 - 96 ng/mL 38           erosive RA discontinued long term methotrexate b/o nausea with both po and sc and reduced dose, allergic to sulfa, and had reaction to hydroxychloroquine;  Has failed 3 TNFi(infliximab, etanercept, adalimumab), abatacept, hypogamma with rituximab and gets frequent infections in any event, acute vertigo/dizziness with tocilizumab x2  Failed tofacitinib  Currently on Sarilumab   Future option would be  changing sarilumab to  upadacitinib         RA  TJ 0   SJ 0  Pt global 35 ESR 11 CRP 1.3 DAS28  [id number]   CDAI) 4.5 (LDA)but all driven by low back pain, neck pain, fibromyalgia, right shoulder pain, OA right knee  Flared off leflunomide and sarilumab but almost immediate improvement after resuming. Left ankle pain helped greatly with 5 day nerve block LLE  Rheumatoid Arthritis Treatment Goals:  -Disease Activity Measure:CDAI   -Target: LDA  -Patient Goal:  -Therapy/Change: none, activity scores driven by back symptoms, neck symptoms, fibromyalgia , right shoulder, OA right knee, not RA. No need to change sarilumab to upadacitinib  -Shared Decision Making: Discussed goals of care and therapy plan together with patient as outlined above.      Osteoporosis DXA 3/8/22: normal with FRAX : hip 1.6% major 23%  : completed 2 years of teriparatide added to denosumab, now denosumab alone most recent dose  12/7/22  Cont denosumab 60mg sc q  6months(since 2017); teriparatide 1/2020  -1/2022  Saw Dr. Lane Endo 1/5/22: cont denosumab, could potentially resume teriparatide or switch to romosozumab if fractures on denosumab.  Fibromyalgia: intolerant of duloxetine, gabapentin. pregabalin 100mg nightly helps  Subclinical hyperthyroidism, thyroid nodule: Dr. Lane  TSH 1.2  11/17/21  Lumbar spondylosis, had LESI L3-4 9/29/22, RFA left femoral and obturator 7/25/22, RFA right L3,4,5 5/12/22, RFA left L3,4,5 4/21/22 + pothere  Hyperlipidemia LDL 81.6 10/3/22 on rosuvastatin 20mg nightly  /82  OA right knee to have viscosupplementation by Ortho  OA right ACJ  Elevated alk phos, AST., ALT GGT    Repeat hepatic function panel GGT 2/8/23  Rodrigo Chi for fibromyalgia and OA knees and hips  pregabalin to 100mg nightly  sarilumab 200 mg sc q 2 wks  leflunomide 20mg daily  prednisone  5 mg daily  pregabalin 100mg nightly, sleeping better spirits better  continue yoga on You Tube  Aerobic exercises stationary bike  F/u Pain Management for neck and back  Continue denosumab 60 mg sc q 6 months  next dose 6/7/23  1200 mg dietary calcium daily  RTC 3 months with standing labs

## 2023-01-31 NOTE — PROGRESS NOTES
Rapid3 Question Responses and Scores 1/31/2023   MDHAQ Score 1.9   Psychologic Score 2.2   Pain Score 3   When you awakened in the morning OVER THE LAST WEEK, did you feel stiff? No   If Yes, please indicate the number of hours until you are as limber as you will be for the day -   Fatigue Score 2.5   Global Health Score 3.5   RAPID3 Score 4.28     Answers submitted by the patient for this visit:  Rheumatology Questionnaire (Submitted on 1/31/2023)  fever: No  eye redness: No  mouth sores: No  headaches: No  shortness of breath: No  chest pain: No  trouble swallowing: No  diarrhea: No  constipation: No  unexpected weight change: No  genital sore: No  dysuria: No  During the last 3 days, have you had a skin rash?: No  Bruises or bleeds easily: No  cough: No       11:30

## 2023-02-02 ENCOUNTER — PATIENT MESSAGE (OUTPATIENT)
Dept: ORTHOPEDICS | Facility: CLINIC | Age: 63
End: 2023-02-02
Payer: MEDICARE

## 2023-02-03 ENCOUNTER — PATIENT MESSAGE (OUTPATIENT)
Dept: ORTHOPEDICS | Facility: CLINIC | Age: 63
End: 2023-02-03
Payer: MEDICARE

## 2023-02-03 ENCOUNTER — TELEPHONE (OUTPATIENT)
Dept: ORTHOPEDICS | Facility: CLINIC | Age: 63
End: 2023-02-03
Payer: MEDICARE

## 2023-02-03 PROBLEM — M79.2 NEUROPATHIC PAIN OF LEFT FOOT: Status: ACTIVE | Noted: 2023-02-03

## 2023-02-10 ENCOUNTER — PATIENT MESSAGE (OUTPATIENT)
Dept: ORTHOPEDICS | Facility: CLINIC | Age: 63
End: 2023-02-10
Payer: MEDICARE

## 2023-02-10 RX ORDER — METHOCARBAMOL 750 MG/1
750 TABLET, FILM COATED ORAL 3 TIMES DAILY PRN
Qty: 42 TABLET | Refills: 0 | Status: SHIPPED | OUTPATIENT
Start: 2023-02-10 | End: 2023-02-24 | Stop reason: SDUPTHER

## 2023-02-10 NOTE — TELEPHONE ENCOUNTER
Called patient to discuss refill and nerve pain.    Discussed nerve glides as patient is in wheelchair most of day. Dr. Moran has given okay to increase lyrica to tid. Just started RX topical - is not helping yet. Told her to give it some time to work, if it is not working we can try a different one.     Jason Arreguin MS, OTC   Sports Medicine Assistant   [facility]  (P) [phone]  (F) [phone]

## 2023-02-14 NOTE — ASSESSMENT & PLAN NOTE
Check ultrasound in 2020 around time of next visit.    Unsure if mild TSH suppression is truly subclinical hyperthyroidism or just effect from steroids. Most recent TSHs have been normal.   [facility], [facility]  [address] [facility] [place], [facility]  Phone: [phone]   Fax:     [phone]    Physical Therapy  Cancellation/No-show Note  Patient Name:  Shaun Shea  :  1973   Date:  2023  Cancelled visits to date: 2  No-shows to date: 1    Patient status for today's appointment patient:  []  Cancelled  []  Rescheduled appointment  [x]  No-show for pool / Creasie Daunt     Reason given by patient:  []  Patient ill  []  Conflicting appointment  []  No transportation    []  Conflict with work  []  No reason given  []  Other:     Comments:      Phone call information:   [x]  Phone call made today to patient at 4:30_ time at number provided:      [x]  Patient answered, conversation as follows: called re: NS, reports to have CT LB 23 and F/U with MD 22, wishes to hold PT until after MD visit.         []  Patient did not answer, message left as follows:    []  Phone call not made today    Electronically signed by:  Rebecca Acosta, PTA  [id number]

## 2023-02-15 DIAGNOSIS — M21.42 PES PLANOVALGUS, ACQUIRED, LEFT: Primary | ICD-10-CM

## 2023-02-16 ENCOUNTER — HOSPITAL ENCOUNTER (OUTPATIENT)
Dept: RADIOLOGY | Facility: HOSPITAL | Age: 63
Discharge: HOME | End: 2023-02-16
Attending: ORTHOPAEDIC SURGERY
Payer: MEDICARE

## 2023-02-16 ENCOUNTER — OFFICE VISIT (OUTPATIENT)
Dept: SPORTS MEDICINE | Facility: CLINIC | Age: 63
End: 2023-02-16
Payer: MEDICARE

## 2023-02-16 VITALS
HEIGHT: 61 IN | SYSTOLIC BLOOD PRESSURE: 112 MMHG | HEART RATE: 86 BPM | BODY MASS INDEX: 25.7 KG/M2 | DIASTOLIC BLOOD PRESSURE: 76 MMHG

## 2023-02-16 DIAGNOSIS — M17.11 PRIMARY OSTEOARTHRITIS OF RIGHT KNEE: Primary | ICD-10-CM

## 2023-02-16 DIAGNOSIS — G89.29 CHRONIC PAIN OF RIGHT KNEE: ICD-10-CM

## 2023-02-16 DIAGNOSIS — M25.561 RIGHT KNEE PAIN, UNSPECIFIED CHRONICITY: ICD-10-CM

## 2023-02-16 DIAGNOSIS — M25.561 CHRONIC PAIN OF RIGHT KNEE: ICD-10-CM

## 2023-02-16 PROCEDURE — 3074F PR MOST RECENT SYSTOLIC BLOOD PRESSURE < 130 MM HG: ICD-10-PCS | Mod: CPTII,S$GLB,, | Performed by: ORTHOPAEDIC SURGERY

## 2023-02-16 PROCEDURE — 99999 PR PBB SHADOW E&M-EST. PATIENT-LVL II: CPT | Mod: PBBFAC,,, | Performed by: ORTHOPAEDIC SURGERY

## 2023-02-16 PROCEDURE — 73562 X-RAY EXAM OF KNEE 3: CPT | Mod: TC,RT

## 2023-02-16 PROCEDURE — 3078F PR MOST RECENT DIASTOLIC BLOOD PRESSURE < 80 MM HG: ICD-10-PCS | Mod: CPTII,S$GLB,, | Performed by: ORTHOPAEDIC SURGERY

## 2023-02-16 PROCEDURE — 3074F SYST BP LT 130 MM HG: CPT | Mod: CPTII,S$GLB,, | Performed by: ORTHOPAEDIC SURGERY

## 2023-02-16 PROCEDURE — 99214 PR OFFICE/OUTPT VISIT, EST, LEVL IV, 30-39 MIN: ICD-10-PCS | Mod: S$GLB,,, | Performed by: ORTHOPAEDIC SURGERY

## 2023-02-16 PROCEDURE — 3008F PR BODY MASS INDEX (BMI) DOCUMENTED: ICD-10-PCS | Mod: CPTII,S$GLB,, | Performed by: ORTHOPAEDIC SURGERY

## 2023-02-16 PROCEDURE — 73562 XR KNEE 3 VIEW RIGHT: ICD-10-PCS | Mod: 26,RT,, | Performed by: RADIOLOGY

## 2023-02-16 PROCEDURE — 73562 X-RAY EXAM OF KNEE 3: CPT | Mod: 26,RT,, | Performed by: RADIOLOGY

## 2023-02-16 PROCEDURE — 99214 OFFICE O/P EST MOD 30 MIN: CPT | Mod: S$GLB,,, | Performed by: ORTHOPAEDIC SURGERY

## 2023-02-16 PROCEDURE — 99999 PR PBB SHADOW E&M-EST. PATIENT-LVL II: ICD-10-PCS | Mod: PBBFAC,,, | Performed by: ORTHOPAEDIC SURGERY

## 2023-02-16 PROCEDURE — 3078F DIAST BP <80 MM HG: CPT | Mod: CPTII,S$GLB,, | Performed by: ORTHOPAEDIC SURGERY

## 2023-02-16 PROCEDURE — 3008F BODY MASS INDEX DOCD: CPT | Mod: CPTII,S$GLB,, | Performed by: ORTHOPAEDIC SURGERY

## 2023-02-16 NOTE — PROGRESS NOTES
CC: Right knee pain    62 y.o. Female who presents for follow up evaluation for her right knee. She is currently non weight bearing on her LLE b/c of her left foot surgery with Dr. Finnegan. At her last visit she was given a corticosteroid injection into the right knee on 9/20/22 that she states gave her moderate relief that lasted over 3 months. Complaint is right knee pain more then 6 months. She says her pain was decreased following the injection, however states that her pain has recurred a bit because she has had to be nonweightbearing on the left side. . Pain localizes mostly lateral but is generalized throughout the right knee.  Endorses associated swelling or effusions. No prominent mechanical symptoms. Denies instability. Worse with prolonged ambulation or standing. Better with rest. Treatment thus far has included rest, activity modifications, oral medications. Here today to discuss diagnosis and treatment options.      Of note patient recently underwent left foot fusion surgery with Dr. Finnegan on 1/11/23.    Also under the care of pain management for chronic low back pain.    PMHx notable for RA- currently on Kevzara, Arava, and 2mg Prednisone daily. Sees Dr. Isiah Moran.    Negative for tobacco.   Negative for diabetes.    Pain Score:   3    REVIEW OF SYSTEMS:   Constitution: Negative. Negative for chills, fever and night sweats.    Hematologic/Lymphatic: Negative for bleeding problem. Does not bruise/bleed easily.   Skin: Negative for dry skin, itching and rash.   Musculoskeletal: Negative for falls. Positive for right knee pain and muscle weakness.     All other review of symptoms were reviewed and found to be noncontributory.     PAST MEDICAL HISTORY:   Past Medical History:   Diagnosis Date    Acid reflux     Allergy     Anemia     Asthma     Coronary artery disease     CS (cervical spondylosis) 03/08/2013    Degenerative disc disease     Dry eyes     Dry mouth     Gastroparesis     History of  methotrexate therapy 01/19/2022    Hyperlipidemia     Lateral meniscus derangement 04/06/2016    Lobular carcinoma in situ     Lumbar spondylosis 03/08/2013    Osteoarthritis     Osteoporosis     Rheumatoid arthritis(714.0)     Rupture of left triceps tendon 10/17/2018    Umbilical hernia 08/13/2015       PAST SURGICAL HISTORY:   Past Surgical History:   Procedure Laterality Date    BREAST BIOPSY Left 01/29/2002    core bx    CARPAL TUNNEL RELEASE Right 05/2017    CHOLECYSTECTOMY  2004    COLONOSCOPY      10/11    COLONOSCOPY N/A 6/29/2017    Procedure: COLONOSCOPY;  Surgeon: López Moore MD;  Location: [facility] ENDO (4TH FLR);  Service: Endoscopy;  Laterality: N/A;    EPIDURAL STEROID INJECTION N/A 11/18/2021    Procedure: INJECTION, STEROID, EPIDURAL, L5-S1 IL NEED CONSENT;  Surgeon: Tj Mayfield MD;  Location: [facility] PAIN MGT;  Service: Pain Management;  Laterality: N/A;    EPIDURAL STEROID INJECTION N/A 9/29/2022    Procedure: LUMBAR L3/L4 IL MADELINE CONTRAST;  Surgeon: Tj Mayfield MD;  Location: [facility] PAIN MGT;  Service: Pain Management;  Laterality: N/A;    EPIDURAL STEROID INJECTION N/A 12/12/2022    Procedure: INJECTION, STEROID, EPIDURAL L5/S1 IL CONTRAST;  Surgeon: Tj Mayfield MD;  Location: [facility] PAIN MGT;  Service: Pain Management;  Laterality: N/A;    FOOT ARTHRODESIS Left 1/11/2023    Procedure: FUSION, FOOT;  Surgeon: Ariella Finnegan MD;  Location: [facility] OR;  Service: Orthopedics;  [facility]: [facility]    HARVESTING OF BONE GRAFT Left 1/11/2023    Procedure: SURGICAL PROCUREMENT, BONE GRAFT;  Surgeon: Ariella Finnegan MD;  Location: [facility] OR;  Service: Orthopedics;  Laterality: Left;    INJECTION OF ANESTHETIC AGENT AROUND NERVE Bilateral 8/23/2021    Procedure: BLOCK, NERVE, MEDIAL BRANCH L3,L4,L5;  Surgeon: Tj Mayfield MD;  Location: [facility] PAIN MGT;  Service: Pain Management;  Laterality: Bilateral;  1 of 2    INJECTION OF ANESTHETIC AGENT AROUND NERVE Bilateral 8/26/2021    Procedure: BLOCK, NERVE, MEDIAL  BRANCH L3,L4,L5;  Surgeon: jT Mayfield MD;  Location: [facility] PAIN MGT;  Service: Pain Management;  Laterality: Bilateral;  2 of 2    INJECTION OF ANESTHETIC AGENT AROUND NERVE Left 7/7/2022    Procedure: BLOCK, NERVE, LEFT OBTURATOR AND FEMORAL;  Surgeon: Tj Mayfield MD;  Location: [facility] PAIN MGT;  Service: Pain Management;  Laterality: Left;    INJECTION OF FACET JOINT Bilateral 3/12/2020    Procedure: FACET JOINT INJECTION (LUMBAR BLOCK) BILATERAL L4-5 AND L5-S1 DIRECT REFERRAL;  Surgeon: Tj Mayfield MD;  Location: [facility] PAIN [facility]GT;  Service: Pain Management;  Laterality: Bilateral;  NEEDS CONSENT    INJECTION OF FACET JOINT Bilateral 3/29/2021    Procedure: INJECTION, FACET JOINT L3/4, L4/5, L5/S1;  Surgeon: Tj Mayfield MD;  Location: [facility] PAIN MGT;  Service: Pain Management;  Laterality: Bilateral;    INJECTION OF JOINT Right 7/30/2020    Procedure: INJECTION, JOINT, RIGHT HIP Interarticular under flouro;  Surgeon: Tj Mayfield MD;  Location: [facility]IN [facility]GT;  Service: Pain Management;  Laterality: Right;  INJECTION, JOINT, RIGHT HIP Interarticular under flouro    INJECTION OF JOINT Right 2/21/2022    Procedure: Injection, Joint RIGHT ISCHIAL BURSA;  Surgeon: Tj Mayfield MD;  Location: [facility] PAIN MGT;  Service: Pain Management;  Laterality: Right;    INTRAMEDULLARY RODDING OF FEMUR Left 5/21/2019    Procedure: INSERTION, INTRAMEDULLARY ARIEL, FEMUR;  Surgeon: López Duff MD;  Location: [address];  Service: Orthopedics;  Laterality: Left;    LENGTHENING OF TENDON OF LOWER EXTREMITY Left 1/11/2023    Procedure: LENGTHENING, TENDON, LOWER EXTREMITY;  Surgeon: Ariella Finnegan MD;  Location: [facility] OR;  Service: Orthopedics;  Laterality: Left;    RADIOFREQUENCY ABLATION Left 9/20/2021    Procedure: RADIOFREQUENCY ABLATION left L3,4,5 RFA  1 of 2  CONSENT NEEDED;  Surgeon: Tj Mayfield MD;  Location: [facility] PAIN MGT;  Service: Pain Management;  Laterality: Left;    RADIOFREQUENCY ABLATION Right 10/4/2021     Procedure: RADIOFREQUENCY ABLATION  right L3,4,5 RFA   2 of 2  CONSENT NEEDED;  Surgeon: Tj Mayfield MD;  Location: [facility] PAIN MGT;  Service: Pain Management;  Laterality: Right;    RADIOFREQUENCY ABLATION Left 4/21/2022    Procedure: Radiofrequency Ablation LEFT L3,L4,L5;  Surgeon: Tj Mayfield MD;  Location: [facility] PAIN MGT;  Service: Pain Management;  Laterality: Left;    RADIOFREQUENCY ABLATION Right 5/12/2022    Procedure: Radiofrequency Ablation RIGHT L3,L4,L5;  Surgeon: Tj Mayfield MD;  Location: [facility] PAIN MGT;  Service: Pain Management;  Laterality: Right;    RADIOFREQUENCY ABLATION Left 7/25/2022    Procedure: Radiofrequency Ablation Left Femoral & Obturator;  Surgeon: Tj Mayfield MD;  Location: [facility] PAIN MGT;  Service: Pain Management;  Laterality: Left;    REPAIR OF TRICEPS TENDON Left 10/17/2018    Procedure: REPAIR, TENDON, TRICEPS left elbow;  Surgeon: Staci Yarbrough MD;  Location: [facility] OR [address] [place] [address];  Service: Orthopedics;  Laterality: Left;  Anesthesia: General and regional. PRONE, k-wire , hand pan 1 and pan 2, CALL ARTHREX/Tamera notified 10-12 LO    TONSILLECTOMY      TRANSFORAMINAL EPIDURAL INJECTION OF STEROID Right 8/31/2020    Procedure: INJECTION, STEROID, EPIDURAL, TRANSFORAMINAL,  APPROACH, L3-L4 and L4-L5 need consent;  Surgeon: Tj Mayfield MD;  Location: [facility] PAIN MGT;  Service: Pain Management;  Laterality: Right;    TRANSFORAMINAL EPIDURAL INJECTION OF STEROID Bilateral 7/23/2021    Procedure: INJECTION, STEROID, EPIDURAL, TRANSFORAMINAL APPROACH L4/5;  Surgeon: Tj Mayfield MD;  Location: [facility] PAIN MGT;  Service: Pain Management;  Laterality: Bilateral;    TRIGGER POINT INJECTION N/A 7/23/2021    Procedure: INJECTION, TRIGGER POINT SCAPULAR;  Surgeon: Tj Mayfield MD;  Location: [facility] PAIN MGT;  Service: Pain Management;  Laterality: N/A;    TUBAL LIGATION  2003    UPPER GASTROINTESTINAL ENDOSCOPY      10/11    uterine ablation  2003       FAMILY HISTORY:   Family History    Problem Relation Age of Onset    Cancer Mother         Lung Cancer    Emphysema Mother     Heart attack Mother     Alcohol abuse Mother     Cancer Father         Lung Cancer    Osteoarthritis Father     Lung cancer Father     Skin cancer Father     Alcohol abuse Father     Heart disease Brother     Heart attack Brother     Alcohol abuse Brother     Cirrhosis Brother     Osteoarthritis Paternal Aunt     Retinal detachment Maternal Aunt     No Known Problems Sister     No Known Problems Maternal Uncle     No Known Problems Paternal Uncle     No Known Problems Maternal Grandmother     No Known Problems Maternal Grandfather     No Known Problems Paternal Grandmother     No Known Problems Paternal Grandfather     Colon cancer Neg Hx     Esophageal cancer Neg Hx     Stomach cancer Neg Hx     Celiac disease Neg Hx     Diabetes Neg Hx     Thyroid disease Neg Hx     Amblyopia Neg Hx     Blindness Neg Hx     Cataracts Neg Hx     Glaucoma Neg Hx     Hypertension Neg Hx     Macular degeneration Neg Hx     Strabismus Neg Hx     Stroke Neg Hx        SOCIAL HISTORY:   Social History     Socioeconomic History    Marital status:    Tobacco Use    Smoking status: Never    Smokeless tobacco: Never   Substance and Sexual Activity    Alcohol use: Yes     Comment: 2-3 times per year.    Drug use: No    Sexual activity: Yes     Partners: Male     Birth control/protection: Surgical     Social Determinants of Health     Financial Resource Strain: Low Risk     Difficulty of Paying Living Expenses: Not hard at all   Food Insecurity: Unknown    Worried About Running Out of Food in the Last Year: Patient refused    Ran Out of Food in the Last Year: Patient refused   Transportation Needs: Unknown    Lack of Transportation (Medical): Patient refused    Lack of Transportation (Non-Medical): Patient refused   Physical Activity: Unknown    Days of Exercise per Week: 2 days   Stress: No Stress Concern Present    Feeling of Stress : Not at  all   Social Connections: Unknown    Frequency of Communication with Friends and Family: Twice a week    Frequency of Social Gatherings with Friends and Family: Twice a week    Active Member of Clubs or Organizations: Yes    Attends Club or Organization Meetings: Patient refused    Marital Status:    Housing Stability: Unknown    Unable to Pay for Housing in the Last Year: No    Unstable Housing in the Last Year: No       MEDICATIONS:     Current Outpatient Medications:     albuterol (PROVENTIL/VENTOLIN HFA) 90 mcg/actuation inhaler, INHALE 2 PUFFS INTO THE LUNGS EVERY 6 (SIX) HOURS AS NEEDED. RESCUE, Disp: 20.1 g, Rfl: 11    albuterol-ipratropium (DUO-NEB) 2.5 mg-0.5 mg/3 mL nebulizer solution, Take 3 mLs by nebulization every 6 (six) hours as needed for Wheezing. Rescue, Disp: 90 mL, Rfl: 3    ALPRAZolam (XANAX) 0.5 MG tablet, Take Once on call to procedure, Disp: 1 tablet, Rfl: 0    aspirin 325 MG tablet, Take 1 tablet (325 mg total) by mouth once daily., Disp: 30 tablet, Rfl: 0    budesonide-formoterol 160-4.5 mcg (SYMBICORT) 160-4.5 mcg/actuation HFAA, INHALE 2 PUFFS INTO THE LUNGS EVERY 12 (TWELVE) HOURS., Disp: 30.6 g, Rfl: 3    calcium citrate-vitamin D3 315-200 mg (CITRACAL+D) 315 mg-5 mcg (200 unit) per tablet, Take 1 tablet by mouth 2 (two) times daily. , Disp: , Rfl:     cycloSPORINE (RESTASIS) 0.05 % ophthalmic emulsion, Instill one drop into both eyes two times per day, Disp: 180 each, Rfl: 3    diazePAM (VALIUM) 10 MG Tab, Take 1 tablet (10 mg total) by mouth once as needed., Disp: 1 tablet, Rfl: 0    diclofenac sodium (VOLTAREN) 1 % Gel, APPLY 2 GRAMS TOPICALLY 4 (FOUR) TIMES DAILY AS NEEDED., Disp: 300 g, Rfl: 2    estrogens, conjugated, (PREMARIN) 0.625 MG tablet, take 1 tablet by mouth daily, Disp: 90 tablet, Rfl: 4    halobetasol propion-tazarotene (DUOBRII) 0.01-0.045 % Lotn, aaa on feet and hands qhs  then vaseline and warm towel (Patient taking differently: aaa on feet and hands qhs   then vaseline and warm towel), Disp: 100 g, Rfl: 3    INV PROPULSID 10 MG, Take 2 tablets (20 mg) by mouth 4 (four) times daily. FOR INVESTIGATIONAL USE ONLY. Protocol [id number]-154 Subject ID: [id number]., Disp: 1440 each, Rfl: 0    ketoconazole (NIZORAL) 2 % cream, Apply to feet twice daily for 3 weeks (Patient taking differently: Apply to feet twice daily for 3 weeks), Disp: 60 g, Rfl: 3    leflunomide (ARAVA) 20 MG Tab, Take 1 tablet (20 mg total) by mouth once daily., Disp: 90 tablet, Rfl: 0    lifitegrast (XIIDRA) 5 % Dpet, INSTILL ONE DROP INTO BOTH EYES TWICE A DAY, Disp: 60 mL, Rfl: 10    magic mouthwash diphen/antac/lidoc, Swish and Spit 5 mL by mouth for 30 seconds twice daily., Disp: 150 mL, Rfl: 0    methenamine (HIPREX) 1 gram Tab, Take 1 tablet (1 g total) by mouth 2 (two) times daily., Disp: 180 tablet, Rfl: 3    mirabegron (MYRBETRIQ) 25 mg Tb24 ER tablet, Take 1 tablet (25 mg total) by mouth once daily., Disp: 90 tablet, Rfl: 3    montelukast (SINGULAIR) 10 mg tablet, TAKE 1 TABLET BY MOUTH EVERY DAY AT NIGHT, Disp: 90 tablet, Rfl: 3    omeprazole (PRILOSEC) 40 MG capsule, Take 1 capsule (40 mg total) by mouth every morning., Disp: 30 capsule, Rfl: 6    omeprazole-sodium bicarbonate (ZEGERID) 40-1.1 mg-gram per capsule, TAKE 1 CAPSULE BY MOUTH EVERY DAY IN THE MORNING, Disp: 90 capsule, Rfl: 3    POTASSIUM ORAL, Take by mouth once daily., Disp: , Rfl:     predniSONE (DELTASONE) 5 MG tablet, Take 1 tablet (5 mg total) by mouth once daily., Disp: 90 tablet, Rfl: 1    pregabalin (LYRICA) 50 MG capsule, Take 3 capsules (150 mg total) by mouth every evening. (Patient taking differently: Take 100 mg by mouth every evening.), Disp: 270 capsule, Rfl: 1    PREMARIN vaginal cream, PLACE 0.5 GRAM VAGINALLY 3 (THREE) TIMES A WEEK., Disp: 30 g, Rfl: 1    progesterone (PROMETRIUM) 100 MG capsule, Take 1 capsule by mouth daily., Disp: 90 capsule, Rfl: 1    progesterone (PROMETRIUM) 100 MG capsule, take 1 capsule by  "mouth daily, Disp: 90 capsule, Rfl: 4    promethazine (PHENERGAN) 25 MG tablet, Take 1 tablet (25 mg total) by mouth every 6 (six) hours as needed for Nausea., Disp: 30 tablet, Rfl: 1    promethazine (PHENERGAN) 25 MG tablet, Take 1 tablet (25 mg total) by mouth every 6 (six) hours as needed for Nausea., Disp: 15 tablet, Rfl: 0    rosuvastatin (CRESTOR) 20 MG tablet, Take 1 tablet (20 mg total) by mouth every evening., Disp: 90 tablet, Rfl: 3    sarilumab (KEVZARA) 200 mg/1.14 mL Syrg, Inject 1.14 mL (200 mg total) into the skin every 14 (fourteen) days., Disp: 2.28 mL, Rfl: 2    sucralfate (CARAFATE) 1 gram tablet, TAKE 1 TABLET (1 G TOTAL) BY MOUTH 4 (FOUR) TIMES DAILY., Disp: 360 tablet, Rfl: 2    sucralfate (CARAFATE) 1 gram tablet, Take 1 tablet (1 g total) by mouth 4 (four) times daily., Disp: 360 tablet, Rfl: 3    ALLERGIES:   Review of patient's allergies indicates:   Allergen Reactions    Actemra [tocilizumab] Other (See Comments)     Severe dizziness    Codeine Nausea And Vomiting and Hives    Gold au 198 Hives and Rash    Hydroxychloroquine Other (See Comments)     Can't remember the reaction      Iodinated contrast media Other (See Comments)     Other reaction(s): BURNING ALL OVER    Iodine Other (See Comments) and Hives     Other reaction(s): BURNING ALL OVER - Iodine dye - Not topical    Ondansetron Nausea And Vomiting    Sulfa (sulfonamide antibiotics) Other (See Comments)     Can't remember the reaction    Zofran [ondansetron hcl (pf)] Nausea And Vomiting     Pt reports that last time she received zofran she started vomiting again    Methotrexate analogues Nausea Only    Pneumococcal vaccine Other (See Comments)    Pneumovax 23 [pneumococcal 23-argenis ps vaccine] Other (See Comments)     "sick"    Methotrexate Nausea Only      PHYSICAL EXAMINATION:  /76   Pulse 86   Ht 5' 1" (1.549 m)   LMP  (LMP Unknown)   BMI 25.70 kg/m²   General: Well-developed well-nourished 62 y.o. femalein no acute " distress   Cardiovascular: Regular rhythm by palpation of distal pulse, normal color and temperature, no concerning varicosities on symptomatic side   Lungs: No labored breathing or wheezing appreciated   Neuro: Alert and oriented ×3   Psychiatric: well oriented to person, place and time, demonstrates normal mood and affect   Skin: No rashes, lesions or ulcers, normal temperature, turgor, and texture on involved extremity    Ortho/SPM Exam  Examination of the right knee again demonstrates intact extensor mechanism. Mild effusion or prepatellar swelling. Central patellar tracking. No patellar apprehension. Normal patellar mobility. About 10 degrees from full extension. Flexion to 115. Pain with forced flexion or extension. Mild tenderness along the medial and lateral joint lines. Negative John's for any specific pain or mechanical symptoms. Negative Lachman. Stable to varus/valgus stress testing at 0 and 30 deg. Negative posterior drawer. Ligamentously stable.    IMAGING:  X-rays including standing, weight bearing AP and flexion bilateral knees, RIGHT knee lateral and sunrise views ordered and images reviewed by me show:    Tricompartmental degenerative arthritis of right knee. KL2-3.    MRI reviewed by me and discussed with patient. Study shows: Complete radial tear at the junction of the posterior horn/posterior root ligament of the medial meniscus with surrounding synovitis.  Complex tear anterior body/anterior horn lateral meniscus that closely approximates the anterior root ligament.  Tricompartmental osteoarthritis with mild chondral loss    ASSESSMENT:      ICD-10-CM ICD-9-CM   1. Primary osteoarthritis of right knee  M17.11 715.16   2. Chronic pain of right knee  M25.561 719.46    G89.29 338.29       PLAN:     Complaint of chronic knee pain.  Recurrent despite initial conservative treatment to include steroid injection, self-directed therapy and oral medication.  The patient has had limited weight-bearing  involving her contralateral side which has been an exacerbating factor.  We discussed additional treatment options.  Viscosupplementation is a good option in this case.  She would like to proceed given the extent duration of her complaints.    Euflexxa ordered and we will begin the injection series once approved by insurance.    Procedures

## 2023-02-23 ENCOUNTER — OFFICE VISIT (OUTPATIENT)
Dept: ORTHOPEDICS | Facility: CLINIC | Age: 63
End: 2023-02-23
Payer: MEDICARE

## 2023-02-23 ENCOUNTER — TELEPHONE (OUTPATIENT)
Dept: PAIN MEDICINE | Facility: CLINIC | Age: 63
End: 2023-02-23
Payer: MEDICARE

## 2023-02-23 ENCOUNTER — HOSPITAL ENCOUNTER (OUTPATIENT)
Dept: RADIOLOGY | Facility: HOSPITAL | Age: 63
Discharge: HOME | End: 2023-02-23
Attending: ORTHOPAEDIC SURGERY
Payer: MEDICARE

## 2023-02-23 DIAGNOSIS — M21.42 PES PLANOVALGUS, ACQUIRED, LEFT: ICD-10-CM

## 2023-02-23 DIAGNOSIS — M79.2 NEUROPATHIC PAIN OF LEFT FOOT: Primary | ICD-10-CM

## 2023-02-23 DIAGNOSIS — Z98.890 STATUS POST LEFT FOOT SURGERY: ICD-10-CM

## 2023-02-23 PROCEDURE — 99999 PR PBB SHADOW E&M-EST. PATIENT-LVL III: CPT | Mod: PBBFAC,,, | Performed by: ORTHOPAEDIC SURGERY

## 2023-02-23 PROCEDURE — 1159F MED LIST DOCD IN RCRD: CPT | Mod: CPTII,S$GLB,, | Performed by: ORTHOPAEDIC SURGERY

## 2023-02-23 PROCEDURE — 1159F PR MEDICATION LIST DOCUMENTED IN MEDICAL RECORD: ICD-10-PCS | Mod: CPTII,S$GLB,, | Performed by: ORTHOPAEDIC SURGERY

## 2023-02-23 PROCEDURE — 99024 POSTOP FOLLOW-UP VISIT: CPT | Mod: S$GLB,,, | Performed by: ORTHOPAEDIC SURGERY

## 2023-02-23 PROCEDURE — 73630 X-RAY EXAM OF FOOT: CPT | Mod: TC,LT

## 2023-02-23 PROCEDURE — 73630 X-RAY EXAM OF FOOT: CPT | Mod: 26,LT,, | Performed by: RADIOLOGY

## 2023-02-23 PROCEDURE — 73630 XR FOOT COMPLETE 3 VIEW LEFT: ICD-10-PCS | Mod: 26,LT,, | Performed by: RADIOLOGY

## 2023-02-23 PROCEDURE — 99999 PR PBB SHADOW E&M-EST. PATIENT-LVL III: ICD-10-PCS | Mod: PBBFAC,,, | Performed by: ORTHOPAEDIC SURGERY

## 2023-02-23 PROCEDURE — 99024 PR POST-OP FOLLOW-UP VISIT: ICD-10-PCS | Mod: S$GLB,,, | Performed by: ORTHOPAEDIC SURGERY

## 2023-02-23 NOTE — LETTER
February 23, 2023        Kelli Dacosta, PT             [facility] 5th Fl  [address], [address]  [facility] [address]  Phone: [phone]   Patient: Joyce Peterson   MR Number: [id number]   YOB: 1960   Date of Visit: 2/23/2023     Dear Dr. Dacosta,     Joyce Peterson was seen in clinic 02/23/2023.  The following updated PT orders apply to their ongoing care.    I think she has CRPS and I want to focus on gentle ROM/stretching along with gait training.  Desensitization modalities emphasized too.    I appreciate your assistance in their rehabilitation.  Please don't hesitate to call or reach out with questions/concerns.    Sincerely,    Ariella Finnegan MD  Foot & Ankle Orthopedic Surgery  02/23/2023       Sincerely,      Ariella Finnegan MD            CC  No Recipients    Enclosure

## 2023-02-23 NOTE — TELEPHONE ENCOUNTER
Staff contacted pt to set up appt with , pt  disconnected the call in staff face after saying hold on.

## 2023-02-24 ENCOUNTER — PATIENT MESSAGE (OUTPATIENT)
Dept: ORTHOPEDICS | Facility: CLINIC | Age: 63
End: 2023-02-24
Payer: MEDICARE

## 2023-02-24 ENCOUNTER — TELEPHONE (OUTPATIENT)
Dept: FAMILY MEDICINE | Facility: CLINIC | Age: 63
End: 2023-02-24
Payer: MEDICARE

## 2023-02-24 RX ORDER — METHOCARBAMOL 750 MG/1
750 TABLET, FILM COATED ORAL 3 TIMES DAILY PRN
Qty: 42 TABLET | Refills: 0 | Status: SHIPPED | OUTPATIENT
Start: 2023-02-24 | End: 2023-03-10

## 2023-02-24 NOTE — TELEPHONE ENCOUNTER
----- Message from Kathi Clancy sent at 2/24/2023  2:39 PM CST -----  Contact: [phone] - pt  Requesting an RX refill or new RX. refill  Is this a refill or new RX:  refill  RX name and strength (copy/paste from chart):  methocarbamoL (ROBAXIN) 750 MG Tab 42 tablet   Is this a 30 day or 90 day RX: 90  Pharmacy name and phone # (copy/paste from chart):    [facility]  [address] [address]  [place] [address]  Phone: [phone] Fax: [phone]

## 2023-02-25 ENCOUNTER — TELEPHONE (OUTPATIENT)
Dept: ORTHOPEDICS | Facility: CLINIC | Age: 63
End: 2023-02-25
Payer: MEDICARE

## 2023-02-25 NOTE — PROGRESS NOTES
Subjective:   Chief complaint: Follow-up foot surgery    Surgery history: 1/11/23 left triple arthrodesis, midfoot arthrodesis, ARLEN, CBG    HPI:   Joyce Peterson is a 62 y.o. female who presents today for postop follow-up.  Pain intense - notes since block wore off she has had night time dysesthesias and burning.  It is dorsal foot > plantar foot.  Lyrica doesn't help.  Having a hard time tolerating boot as a result.    Notes history of similar type pain after surgery with Dr. Duff and has RFA done by Dr. Mayfield with resolution.  She is back on her RA medications.    ROS:  Musculoskeletal: per HPI     Objective:   Exam:  There were no vitals filed for this visit.  General: No acute distress, well-appearing  Musculoskeletal: Standing examination demonstrates neutral foot position and plantigrade.      Incision benign.  Minimal scabbing present.  No drainage present.  There is appropriate postop swelling.  There are skin changes consistent with vasomotor changes with reactive hyperemia in the forefoot.  It improves with elevation.    Fires TA/GSC/PTT/peroneals but guarded.  SILT SP/DP/PT with  paresthesias but no dysesthesias currently.     Imaging:  Radiographs were ordered and independently interpreted by me.    NWB 3v foot demonstrates stable postop changes of arthrodesis without hardware failure or loosening.      Assessment:     1. Neuropathic pain of left foot    2. Status post left foot surgery        6 weeks postop     Plan:       Weight bearing: Advance to weight bearing as tolerated in boot as pain allows  Immobilization: Tall boot only for ambulating  Therapy: PT referral placed- start PT asap, note sent to Kelli  Blood clot prevention: Ok to discontinue aspirin, unless this was already prescribed by PCP  Wound care: None  Showering: No restrictions.  Pain meds: Continue lyrica and robaxin and will refer back to Dr. Mayfield about suspected postop CRPS  Nutrition:  Continue per Dr. Moran  Follow-up: 6  weeks with 3v left foot standing     No orders of the defined types were placed in this encounter.      Past Medical History:   Diagnosis Date    Acid reflux     Allergy     Anemia     Asthma     Coronary artery disease     CS (cervical spondylosis) 03/08/2013    Degenerative disc disease     Dry eyes     Dry mouth     Gastroparesis     History of methotrexate therapy 01/19/2022    Hyperlipidemia     Lateral meniscus derangement 04/06/2016    Lobular carcinoma in situ     Lumbar spondylosis 03/08/2013    Osteoarthritis     Osteoporosis     Rheumatoid arthritis(714.0)     Rupture of left triceps tendon 10/17/2018    Umbilical hernia 08/13/2015       Past Surgical History:   Procedure Laterality Date    BREAST BIOPSY Left 01/29/2002    core bx    CARPAL TUNNEL RELEASE Right 05/2017    CHOLECYSTECTOMY  2004    COLONOSCOPY      10/11    COLONOSCOPY N/A 6/29/2017    Procedure: COLONOSCOPY;  Surgeon: López Moore MD;  Location: [facility] ENDO [address]);  Service: Endoscopy;  Laterality: N/A;    EPIDURAL STEROID INJECTION N/A 11/18/2021    Procedure: INJECTION, STEROID, EPIDURAL, L5-S1 IL NEED CONSENT;  Surgeon: Tj Mayfield MD;  Location: [facility] PAIN MGT;  Service: Pain Management;  Laterality: N/A;    EPIDURAL STEROID INJECTION N/A 9/29/2022    Procedure: LUMBAR L3/L4 IL MADELINE CONTRAST;  Surgeon: Tj Mayfield MD;  Location: [facility] PAIN MGT;  Service: Pain Management;  Laterality: N/A;    EPIDURAL STEROID INJECTION N/A 12/12/2022    Procedure: INJECTION, STEROID, EPIDURAL L5/S1 IL CONTRAST;  Surgeon: Tj Mayfield MD;  Location: [facility] PAIN MGT;  Service: Pain Management;  Laterality: N/A;    FOOT ARTHRODESIS Left 1/11/2023    Procedure: FUSION, FOOT;  Surgeon: Ariella Finnegan MD;  Location: [facility] OR;  Service: Orthopedics;  Laterality: Left;  [facility]    HARVESTING OF BONE GRAFT Left 1/11/2023    Procedure: SURGICAL PROCUREMENT, BONE GRAFT;  Surgeon: Ariella Finnegan MD;  Location: [facility] OR;  Service: Orthopedics;  Laterality: Left;     INJECTION OF ANESTHETIC AGENT AROUND NERVE Bilateral 8/23/2021    Procedure: BLOCK, NERVE, MEDIAL BRANCH L3,L4,L5;  Surgeon: Tj Mayfield MD;  Location: [facility] PAIN MGT;  Service: Pain Management;  Laterality: Bilateral;  1 of 2    INJECTION OF ANESTHETIC AGENT AROUND NERVE Bilateral 8/26/2021    Procedure: BLOCK, NERVE, MEDIAL BRANCH L3,L4,L5;  Surgeon: Tj Mayfield MD;  Location: [facility] PAIN MGT;  Service: Pain Management;  Laterality: Bilateral;  2 of 2    INJECTION OF ANESTHETIC AGENT AROUND NERVE Left 7/7/2022    Procedure: BLOCK, NERVE, LEFT OBTURATOR AND FEMORAL;  Surgeon: Tj Mayfield MD;  Location: [facility] PAIN MGT;  Service: Pain Management;  Laterality: Left;    INJECTION OF FACET JOINT Bilateral 3/12/2020    Procedure: FACET JOINT INJECTION (LUMBAR BLOCK) BILATERAL L4-5 AND L5-S1 DIRECT REFERRAL;  Surgeon: Tj Mayfield MD;  Location: [facility] PAIN MGT;  Service: Pain Management;  Laterality: Bilateral;  NEEDS CONSENT    INJECTION OF FACET JOINT Bilateral 3/29/2021    Procedure: INJECTION, FACET JOINT L3/4, L4/5, L5/S1;  Surgeon: Tj Mayfield MD;  Location: [facility] PAIN MGT;  Service: Pain Management;  Laterality: Bilateral;    INJECTION OF JOINT Right 7/30/2020    Procedure: INJECTION, JOINT, RIGHT HIP Interarticular under flouro;  Surgeon: Tj Mayfield MD;  Location: [facility] PAIN MGT;  Service: Pain Management;  Laterality: Right;  INJECTION, JOINT, RIGHT HIP Interarticular under flouro    INJECTION OF JOINT Right 2/21/2022    Procedure: Injection, Joint RIGHT ISCHIAL BURSA;  Surgeon: Tj Mayfield MD;  Location: [facility] PAIN MGT;  Service: Pain Management;  Laterality: Right;    INTRAMEDULLARY RODDING OF FEMUR Left 5/21/2019    Procedure: INSERTION, INTRAMEDULLARY ARIEL, FEMUR;  Surgeon: López Duff MD;  Location: [facility] OR [address] [place] [address];  Service: Orthopedics;  Laterality: Left;    LENGTHENING OF TENDON OF LOWER EXTREMITY Left 1/11/2023    Procedure: LENGTHENING, TENDON, LOWER EXTREMITY;  Surgeon: Ariella Finnegan MD;   Location: [facility] OR;  Service: Orthopedics;  Laterality: Left;    RADIOFREQUENCY ABLATION Left 9/20/2021    Procedure: RADIOFREQUENCY ABLATION left L3,4,5 RFA  1 of 2  CONSENT NEEDED;  Surgeon: Tj Mayfield MD;  Location: [facility] PAIN MGT;  Service: Pain Management;  Laterality: Left;    RADIOFREQUENCY ABLATION Right 10/4/2021    Procedure: RADIOFREQUENCY ABLATION  right L3,4,5 RFA   2 of 2  CONSENT NEEDED;  Surgeon: Tj Mayfield MD;  Location: [facility] PAIN MGT;  Service: Pain Management;  Laterality: Right;    RADIOFREQUENCY ABLATION Left 4/21/2022    Procedure: Radiofrequency Ablation LEFT L3,L4,L5;  Surgeon: Tj Mayfield MD;  Location: [facility] PAIN MGT;  Service: Pain Management;  Laterality: Left;    RADIOFREQUENCY ABLATION Right 5/12/2022    Procedure: Radiofrequency Ablation RIGHT L3,L4,L5;  Surgeon: Tj Mayfield MD;  Location: [facility] PAIN MGT;  Service: Pain Management;  Laterality: Right;    RADIOFREQUENCY ABLATION Left 7/25/2022    Procedure: Radiofrequency Ablation Left Femoral & Obturator;  Surgeon: Tj Mayfield MD;  Location: [facility] PAIN MGT;  Service: Pain Management;  Laterality: Left;    REPAIR OF TRICEPS TENDON Left 10/17/2018    Procedure: REPAIR, TENDON, TRICEPS left elbow;  Surgeon: Staci Yarbrough MD;  Location: [facility] OR [address] [place] [address];  Service: Orthopedics;  Laterality: Left;  Anesthesia: General and regional. PRONE, k-wire , hand pan 1 and pan 2, CALL ARTHREX/Tamera notified 10-12 LO    TONSILLECTOMY      TRANSFORAMINAL EPIDURAL INJECTION OF STEROID Right 8/31/2020    Procedure: INJECTION, STEROID, EPIDURAL, TRANSFORAMINAL,  APPROACH, L3-L4 and L4-L5 need consent;  Surgeon: Tj Mayfield MD;  Location: [facility] PAIN MGT;  Service: Pain Management;  Laterality: Right;    TRANSFORAMINAL EPIDURAL INJECTION OF STEROID Bilateral 7/23/2021    Procedure: INJECTION, STEROID, EPIDURAL, TRANSFORAMINAL APPROACH L4/5;  Surgeon: Tj Mayfield MD;  Location: [facility] PAIN MGT;  Service: Pain Management;  Laterality: Bilateral;     TRIGGER POINT INJECTION N/A 7/23/2021    Procedure: INJECTION, TRIGGER POINT SCAPULAR;  Surgeon: Tj Mayfield MD;  Location: [facility] PAIN MGT;  Service: Pain Management;  Laterality: N/A;    TUBAL LIGATION  2003    UPPER GASTROINTESTINAL ENDOSCOPY      10/11    uterine ablation  2003       Family History   Problem Relation Age of Onset    Cancer Mother         Lung Cancer    Emphysema Mother     Heart attack Mother     Alcohol abuse Mother     Cancer Father         Lung Cancer    Osteoarthritis Father     Lung cancer Father     Skin cancer Father     Alcohol abuse Father     Heart disease Brother     Heart attack Brother     Alcohol abuse Brother     Cirrhosis Brother     Osteoarthritis Paternal Aunt     Retinal detachment Maternal Aunt     No Known Problems Sister     No Known Problems Maternal Uncle     No Known Problems Paternal Uncle     No Known Problems Maternal Grandmother     No Known Problems Maternal Grandfather     No Known Problems Paternal Grandmother     No Known Problems Paternal Grandfather     Colon cancer Neg Hx     Esophageal cancer Neg Hx     Stomach cancer Neg Hx     Celiac disease Neg Hx     Diabetes Neg Hx     Thyroid disease Neg Hx     Amblyopia Neg Hx     Blindness Neg Hx     Cataracts Neg Hx     Glaucoma Neg Hx     Hypertension Neg Hx     Macular degeneration Neg Hx     Strabismus Neg Hx     Stroke Neg Hx        Social History     Socioeconomic History    Marital status:    Tobacco Use    Smoking status: Never    Smokeless tobacco: Never   Substance and Sexual Activity    Alcohol use: Yes     Comment: 2-3 times per year.    Drug use: No    Sexual activity: Yes     Partners: Male     Birth control/protection: Surgical     Social Determinants of Health     Financial Resource Strain: Low Risk     Difficulty of Paying Living Expenses: Not hard at all   Food Insecurity: Unknown    Worried About Running Out of Food in the Last Year: Patient refused    Ran Out of Food in the Last Year:  Patient refused   Transportation Needs: Unknown    Lack of Transportation (Medical): Patient refused    Lack of Transportation (Non-Medical): Patient refused   Physical Activity: Unknown    Days of Exercise per Week: 2 days   Stress: No Stress Concern Present    Feeling of Stress : Not at all   Social Connections: Unknown    Frequency of Communication with Friends and Family: Twice a week    Frequency of Social Gatherings with Friends and Family: Twice a week    Active Member of Clubs or Organizations: Yes    Attends Club or Organization Meetings: Patient refused    Marital Status:    Housing Stability: Unknown    Unable to Pay for Housing in the Last Year: No    Unstable Housing in the Last Year: No

## 2023-02-27 ENCOUNTER — PATIENT MESSAGE (OUTPATIENT)
Dept: FAMILY MEDICINE | Facility: CLINIC | Age: 63
End: 2023-02-27
Payer: MEDICARE

## 2023-02-28 ENCOUNTER — PATIENT MESSAGE (OUTPATIENT)
Dept: SPORTS MEDICINE | Facility: CLINIC | Age: 63
End: 2023-02-28
Payer: MEDICARE

## 2023-02-28 ENCOUNTER — SPECIALTY PHARMACY (OUTPATIENT)
Dept: PHARMACY | Facility: CLINIC | Age: 63
End: 2023-02-28
Payer: MEDICARE

## 2023-02-28 DIAGNOSIS — M05.79 RHEUMATOID ARTHRITIS INVOLVING MULTIPLE SITES WITH POSITIVE RHEUMATOID FACTOR: Primary | Chronic | ICD-10-CM

## 2023-02-28 NOTE — TELEPHONE ENCOUNTER
Specialty Pharmacy - Refill Coordination    Specialty Medication Orders Linked to Encounter      Flowsheet Row Most Recent Value   Medication #1 sarilumab (KEVZARA) 200 mg/1.14 mL Syrg (Order#[id number], Rx#[id number])            Refill Questions - Documented Responses      Flowsheet Row Most Recent Value   Patient Availability and HIPAA Verification    Does patient want to proceed with activity? Yes   HIPAA/medical authority confirmed? Yes   Relationship to patient of person spoken to? Self   Refill Screening Questions    Changes to allergies? No   Changes to medications? No   New conditions since last clinic visit? No   Unplanned office visit, urgent care, ED, or hospital admission in the last 4 weeks? No   How does patient/caregiver feel medication is working? Good   Financial problems or insurance changes? No   How many doses of your specialty medications were missed in the last 4 weeks? 0   Would patient like to speak to a pharmacist? No   When does the patient need to receive the medication? 03/06/23   Refill Delivery Questions    How will the patient receive the medication? [facility]   When does the patient need to receive the medication? 03/06/23   Shipping Address Home   Address in [facility] confirmed and updated if neccessary? Yes   Expected Copay ($) 218.85   Is the patient able to afford the medication copay? Yes   Payment Method CC on file   Days supply of Refill 28   Supplies needed? No supplies needed   Refill activity completed? Yes   Refill activity plan Refill scheduled   Shipment/Pickup Date: 03/02/23            Current Outpatient Medications   Medication Sig    albuterol (PROVENTIL/VENTOLIN HFA) 90 mcg/actuation inhaler INHALE 2 PUFFS INTO THE LUNGS EVERY 6 (SIX) HOURS AS NEEDED. RESCUE    albuterol-ipratropium (DUO-NEB) 2.5 mg-0.5 mg/3 mL nebulizer solution Take 3 mLs by nebulization every 6 (six) hours as needed for Wheezing. Rescue    ALPRAZolam (XANAX) 0.5 MG tablet Take Once on call to  procedure    aspirin 325 MG tablet Take 1 tablet (325 mg total) by mouth once daily.    budesonide-formoterol 160-4.5 mcg (SYMBICORT) 160-4.5 mcg/actuation HFAA INHALE 2 PUFFS INTO THE LUNGS EVERY 12 (TWELVE) HOURS.    calcium citrate-vitamin D3 315-200 mg (CITRACAL+D) 315 mg-5 mcg (200 unit) per tablet Take 1 tablet by mouth 2 (two) times daily.     cycloSPORINE (RESTASIS) 0.05 % ophthalmic emulsion Instill one drop into both eyes two times per day    diazePAM (VALIUM) 10 MG Tab Take 1 tablet (10 mg total) by mouth once as needed.    diclofenac sodium (VOLTAREN) 1 % Gel APPLY 2 GRAMS TOPICALLY 4 (FOUR) TIMES DAILY AS NEEDED.    estrogens, conjugated, (PREMARIN) 0.625 MG tablet take 1 tablet by mouth daily    halobetasol propion-tazarotene (DUOBRII) 0.01-0.045 % Lotn aaa on feet and hands qhs  then vaseline and warm towel (Patient taking differently: aaa on feet and hands qhs  then vaseline and warm towel)    INV PROPULSID 10 MG Take 2 tablets (20 mg) by mouth 4 (four) times daily. FOR INVESTIGATIONAL USE ONLY. Protocol [id number]  Subject ID: [id number].    ketoconazole (NIZORAL) 2 % cream Apply to feet twice daily for 3 weeks (Patient taking differently: Apply to feet twice daily for 3 weeks)    leflunomide (ARAVA) 20 MG Tab Take 1 tablet (20 mg total) by mouth once daily.    lifitegrast (XIIDRA) 5 % Dpet INSTILL ONE DROP INTO BOTH EYES TWICE A DAY    magic mouthwash diphen/antac/lidoc Swish and Spit 5 mL by mouth for 30 seconds twice daily.    methenamine (HIPREX) 1 gram Tab Take 1 tablet (1 g total) by mouth 2 (two) times daily.    methocarbamoL (ROBAXIN) 750 MG Tab Take 1 tablet (750 mg total) by mouth 3 (three) times daily as needed (muscle spasm).    mirabegron (MYRBETRIQ) 25 mg Tb24 ER tablet Take 1 tablet (25 mg total) by mouth once daily.    montelukast (SINGULAIR) 10 mg tablet TAKE 1 TABLET BY MOUTH EVERY DAY AT NIGHT    omeprazole (PRILOSEC) 40 MG capsule Take 1 capsule (40 mg total) by mouth every morning.     omeprazole-sodium bicarbonate (ZEGERID) 40-1.1 mg-gram per capsule TAKE 1 CAPSULE BY MOUTH EVERY DAY IN THE MORNING    POTASSIUM ORAL Take by mouth once daily.    predniSONE (DELTASONE) 5 MG tablet Take 1 tablet (5 mg total) by mouth once daily.    pregabalin (LYRICA) 50 MG capsule Take 3 capsules (150 mg total) by mouth every evening. (Patient taking differently: Take 100 mg by mouth every evening.)    PREMARIN vaginal cream PLACE 0.5 GRAM VAGINALLY 3 (THREE) TIMES A WEEK.    progesterone (PROMETRIUM) 100 MG capsule Take 1 capsule by mouth daily.    progesterone (PROMETRIUM) 100 MG capsule take 1 capsule by mouth daily    promethazine (PHENERGAN) 25 MG tablet Take 1 tablet (25 mg total) by mouth every 6 (six) hours as needed for Nausea.    promethazine (PHENERGAN) 25 MG tablet Take 1 tablet (25 mg total) by mouth every 6 (six) hours as needed for Nausea.    rosuvastatin (CRESTOR) 20 MG tablet Take 1 tablet (20 mg total) by mouth every evening.    sarilumab (KEVZARA) 200 mg/1.14 mL Syrg Inject 1.14 mL (200 mg total) into the skin every 14 (fourteen) days.    sucralfate (CARAFATE) 1 gram tablet TAKE 1 TABLET (1 G TOTAL) BY MOUTH 4 (FOUR) TIMES DAILY.    sucralfate (CARAFATE) 1 gram tablet Take 1 tablet (1 g total) by mouth 4 (four) times daily.   Last reviewed on 2/23/2023 10:28 AM by Cheryl Caldwell MA    Review of patient's allergies indicates:   Allergen Reactions    Actemra [tocilizumab] Other (See Comments)     Severe dizziness    Codeine Nausea And Vomiting and Hives    Gold au 198 Hives and Rash    Hydroxychloroquine Other (See Comments)     Can't remember the reaction      Iodinated contrast media Other (See Comments)     Other reaction(s): BURNING ALL OVER    Iodine Other (See Comments) and Hives     Other reaction(s): BURNING ALL OVER - Iodine dye - Not topical    Ondansetron Nausea And Vomiting    Sulfa (sulfonamide antibiotics) Other (See Comments)     Can't remember the reaction    Zofran [ondansetron  "hcl (pf)] Nausea And Vomiting     Pt reports that last time she received zofran she started vomiting again    Methotrexate analogues Nausea Only    Pneumococcal vaccine Other (See Comments)    Pneumovax 23 [pneumococcal 23-argenis ps vaccine] Other (See Comments)     "sick"    Methotrexate Nausea Only    Last reviewed on  2/23/2023 10:28 AM by Cheryl Caldwell      Tasks added this encounter   3/27/2023 - Refill Call (Auto Added)   Tasks due within next 3 months   3/4/2023 - Clinical - Follow Up Assesement (Annual)     Yulia Sibley, PharmD  [facility]  [address]  [facility] [address]  Phone: [phone]  Fax: [phone]        "

## 2023-02-28 NOTE — TELEPHONE ENCOUNTER
Pts copay increased with this fill. Called [employer] and confirmed with rep Ruddy that since pt is in catastrophic coverage, she is responsible for 5% of the drug cost. Pt voiced understanding. Obtained HH size and annual income to see if pt qualifies for FA. Routing assigned h and fa team to inform.

## 2023-03-01 ENCOUNTER — TELEPHONE (OUTPATIENT)
Dept: PAIN MEDICINE | Facility: CLINIC | Age: 63
End: 2023-03-01
Payer: MEDICARE

## 2023-03-01 PROBLEM — R26.89 IMPAIRED GAIT AND MOBILITY: Status: ACTIVE | Noted: 2023-03-01

## 2023-03-01 NOTE — TELEPHONE ENCOUNTER
----- Message from Cherise Alonso MA sent at 2/28/2023  2:19 PM CST -----    ----- Message -----  From: Tj Mayfield MD  Sent: 2/28/2023   2:15 PM CST  To: Cherise Alonso MA    Sure  ----- Message -----  From: Cherise Alonso MA  Sent: 2/28/2023   1:50 PM CST  To: Tj Mayfield MD    Do you want this pt double booked?  ----- Message -----  From: Ariella Finnegan MD  Sent: 2/28/2023   1:28 PM CST  To: Cheryl Caldwell MA, [facility] Staff    I am concerned this patient has postop CRPS.  Would you be able to get her in with Dr. Mayfield (I have messaged him as well so he is aware).  He has done RFA and other injections for her in the past.    Thanks!    Wagner        ----- Message -----  From: Cheryl Caldwell MA  Sent: 2/28/2023  11:12 AM CST  To: Ariella Finnegan MD    Pt wanted to inform you she did not hear from  office

## 2023-03-01 NOTE — TELEPHONE ENCOUNTER
Outgoing call attempt to get a more accurate income number and ask the patient how she would like to receive the PAP application. The patient's estimated income puts her right near the upper limit of what the program allows.

## 2023-03-02 NOTE — TELEPHONE ENCOUNTER
Incoming call from patient, We spoke about the program requirements and her estimated income is near the program cut off for patient assistance . She is going to get the exact number for her income and call us back. Following

## 2023-03-08 ENCOUNTER — OFFICE VISIT (OUTPATIENT)
Dept: PAIN MEDICINE | Facility: CLINIC | Age: 63
End: 2023-03-08
Attending: ANESTHESIOLOGY
Payer: MEDICARE

## 2023-03-08 ENCOUNTER — PATIENT MESSAGE (OUTPATIENT)
Dept: PAIN MEDICINE | Facility: OTHER | Age: 63
End: 2023-03-08
Payer: MEDICARE

## 2023-03-08 VITALS
BODY MASS INDEX: 25.7 KG/M2 | SYSTOLIC BLOOD PRESSURE: 108 MMHG | TEMPERATURE: 99 F | OXYGEN SATURATION: 98 % | HEART RATE: 98 BPM | WEIGHT: 136 LBS | DIASTOLIC BLOOD PRESSURE: 68 MMHG

## 2023-03-08 DIAGNOSIS — M48.061 SPINAL STENOSIS OF LUMBAR REGION, UNSPECIFIED WHETHER NEUROGENIC CLAUDICATION PRESENT: ICD-10-CM

## 2023-03-08 DIAGNOSIS — M47.26 OSTEOARTHRITIS OF SPINE WITH RADICULOPATHY, LUMBAR REGION: Primary | ICD-10-CM

## 2023-03-08 PROCEDURE — 3074F SYST BP LT 130 MM HG: CPT | Mod: CPTII,S$GLB,, | Performed by: ANESTHESIOLOGY

## 2023-03-08 PROCEDURE — 3008F PR BODY MASS INDEX (BMI) DOCUMENTED: ICD-10-PCS | Mod: CPTII,S$GLB,, | Performed by: ANESTHESIOLOGY

## 2023-03-08 PROCEDURE — 1159F MED LIST DOCD IN RCRD: CPT | Mod: CPTII,S$GLB,, | Performed by: ANESTHESIOLOGY

## 2023-03-08 PROCEDURE — 1160F RVW MEDS BY RX/DR IN RCRD: CPT | Mod: CPTII,S$GLB,, | Performed by: ANESTHESIOLOGY

## 2023-03-08 PROCEDURE — 1159F PR MEDICATION LIST DOCUMENTED IN MEDICAL RECORD: ICD-10-PCS | Mod: CPTII,S$GLB,, | Performed by: ANESTHESIOLOGY

## 2023-03-08 PROCEDURE — 99214 OFFICE O/P EST MOD 30 MIN: CPT | Mod: GC,S$GLB,, | Performed by: ANESTHESIOLOGY

## 2023-03-08 PROCEDURE — 3078F PR MOST RECENT DIASTOLIC BLOOD PRESSURE < 80 MM HG: ICD-10-PCS | Mod: CPTII,S$GLB,, | Performed by: ANESTHESIOLOGY

## 2023-03-08 PROCEDURE — 99999 PR PBB SHADOW E&M-EST. PATIENT-LVL V: ICD-10-PCS | Mod: PBBFAC,,, | Performed by: ANESTHESIOLOGY

## 2023-03-08 PROCEDURE — 3008F BODY MASS INDEX DOCD: CPT | Mod: CPTII,S$GLB,, | Performed by: ANESTHESIOLOGY

## 2023-03-08 PROCEDURE — 99214 PR OFFICE/OUTPT VISIT, EST, LEVL IV, 30-39 MIN: ICD-10-PCS | Mod: GC,S$GLB,, | Performed by: ANESTHESIOLOGY

## 2023-03-08 PROCEDURE — 3074F PR MOST RECENT SYSTOLIC BLOOD PRESSURE < 130 MM HG: ICD-10-PCS | Mod: CPTII,S$GLB,, | Performed by: ANESTHESIOLOGY

## 2023-03-08 PROCEDURE — 99999 PR PBB SHADOW E&M-EST. PATIENT-LVL V: CPT | Mod: PBBFAC,,, | Performed by: ANESTHESIOLOGY

## 2023-03-08 PROCEDURE — 3078F DIAST BP <80 MM HG: CPT | Mod: CPTII,S$GLB,, | Performed by: ANESTHESIOLOGY

## 2023-03-08 PROCEDURE — 1160F PR REVIEW ALL MEDS BY PRESCRIBER/CLIN PHARMACIST DOCUMENTED: ICD-10-PCS | Mod: CPTII,S$GLB,, | Performed by: ANESTHESIOLOGY

## 2023-03-08 NOTE — TELEPHONE ENCOUNTER
Incoming call from patient regarding Kevzara. Patient stated she is not eligible for Kevzara Connect due to being over income limits. Thoroughly discussed BI with patient. Patient was only paying $80 in catastrophic phase last year and is noq paying $218 in catastrophic phase. This co-pay seems accurate due to her responsibility for 5% of drug cost. Insurance company is currently doing an investigation as to why her co-pay was lower before and will be getting back with patient. She inquired about co-pay card. I informed her Medicare patients are not eligible for co-pay cards. Patient verbalized understanding. Routing to assigned [facility] and FA [facility].

## 2023-03-08 NOTE — PROGRESS NOTES
62 year old female with hx of RA, Sjogren syndrome, lumbar radiculopathy is here for follow up regarding her back pain. Pt is status post left foot fusion surgery on 01/11/2023. Had neuropathic symptoms after at the foot, but that has improved. Here complaining of back pain again.     Onset: It started after she started again 6 weeks after foot surgery when she attempted to walk.   Location: Lower back radiating to b/l knees.    Duration:  Whenever she walks and prolong sitting/lying.   Character: needles sensation/shooting pain when stands up walking and achy pain when she lies down.    Aggravating Factor: Standing still and walking.   Relieving factor: Heat helps a bit;   Pertinent negatives: Denies swelling, erythema, urinary/bowel incontinence, fever, night sweat, weakness.   Treatment: Tramadol; Has not tried PT recently; tried previously, but helped minimally.   Severity: 6/10 normally; 8-9/10 when worse; 4/10 when better.   Background: Has had ablation and steroid shots, which helped the patient. The effect would last about 4-6 months.         Interval History 12/27/2022:  The patient has a virtual visit for follow up of back pain. She is now s/p L5/S1 on 12/12/22 with about 80% relief. She feels like this was more effective that previous injections for her back and leg pain. She is scheduled for left foot surgery next month. She continues with right shoulder pain and is followed by Dr. Yarbrough. She plans for surgical repair in the future after she recovers from foot surgery. Her back pain is overall tolerable at this time. Her pain today is 2/10.     Interval History 11/10/2022:  The patient has a virtual visit for follow up of back pain. She says that L3/4 IL MADELINE did provide approximately 80% relief of back pain. Her back pain has improved and is tolerable at this time which she is happy about. However, she is having more shooting pain to posterior legs which is burning in nature. She saw Dr. Yarbrough  this week for follow up of right shoulder pain and MRI was ordered. She is scheduled for this in the future. She is also having foot surgery in January. Her pain today is 5/10.     Interval History 10/4/2022:  Joyce is here for worsened back pain. She is s/p L3/4 IL MADELINE on 9/29/22 but is not sure how much it helped. She continues to have significant pain over the back with radiation into the legs. Hip pain significantly improved with RFA. Right knee pain significantly improved with recent right knee steroid injection from orthopedics. She did have benefit with PT in the past and would like to restart this. Her pain today is 4/10.     Interval History 08/16/2022:  Patient returns to clinic for a follow up after her left femoral and left obturator coolief for left hip pain on 7/25/22. She reports 90% improvement in her pain, is now able to walk and do most of her activities. She is working now on building up her strength and stamina. Has started back with physical therapy now that her pain is improved, which she has found very helpful. She has lost about 15lbs since becoming more active. She is now concerned about her back pain. She is having pain in her low back that radiates down in the posterior thighs without radiation past the knees.      Pt with CT thigh in June showing Nondisplaced perihardware fracture of the proximal left femur. Pt discussed with Ortho on 8/9/22 and given significant functional improvement after RFA, recommending continued conservative management at this time.      Interval History 7/5/2022:  Patient states that she continues to have pain when she sleeps.  She states that robaxin helps slightly but doesn't get rid of pain.  Toradol shot didn't help much.  She takes robaxin, naproxen, tramadol, and advil.  Pain continues to be in the left thigh.  She describes a burning/numbness radiating from lower back to thigh and front of lower leg.  Pain started 2 weeks ago (prior to her last visit).   Patient states she uses a walker because she feels shaky and weak.  No bowel/bladder incontinence.  She feels numbness in her lowe leg.  Pain is isolated to left leg (no pain on right).      Interval History 6/24/2022:  The patient returns to discuss increased left hip and leg pain.  I saw her earlier this month at which time she had reported a fall.  At that time, she was having more right knee and buttock pain.  We scheduled her for a repeat ischial bursa injection for next week.  She had knee x-rays which did not show any significant abnormality.  She then called back with continued knee pain I ordered an MRI which has not yet been scheduled.  However, she is here today to discuss left hip pain.  The pain started suddenly when she was sleeping 2 nights ago.  She denies any other injury when this started.  She did see Dr. Farfan in her recently to review hip x-rays which showed possible small fracture of which he said there was not much to do for repair.  However at that time, she was not having severe pain.  She has been having difficulty with ambulation and any activity.  She presents today.  Her pain is sharp and severe to the left lateral hip and groin.  It worsens with standing and walking.  She has Norco at home which she tried with minimal benefit.  She also says this makes her itch.  She also tried tramadol which was not helpful.  Her pain today is 10/10.     Interval History 6/9/2022:  The patient returns for follow up of back pain. She underwent left L3,4,5 RFA on 4/21/22 with 80% relief initially. Unfortunately, she had 2 falls close after this time. She tripped while walking and fell onto her right knee. She had another injury in which she jumped from bed tangled in her sheets and fell on her left hip. She did go to the ED in [place] at that time with imaging. She also discussed with Dr. Duff who said that her hardware looked good. She then underwent right L3,4,5 RFA on 5/12/22 with 80% relief  initially. She also reports that 3 days ago she was lifting a beach chair and had a sudden onset of right posterior leg pain. She says she heard a pop at that time. She feels a lot of pain to her right lower buttock with radiation. It worsens when she sits and walks. Her overall pain today is 8/10.     Interval History 3/8/2022:  The patient presents for follow up of back, buttock and leg pain.  He is she is status post right ischial bursa injection 02/21/2022 80% relief of this pain.  She also had trigger point injections her last office visit which provided significant relief of muscle pain.  Her primary complaint today is aching pain across the lower back without radiation.  She has significant pain and stiffness in the mornin8.  This feels similar to pain that had good relief with radiofrequency ablations last year.  She wishes to repeat this in the future.  No new complaints at this time.  She takes tramadol as needed when the pain is severe. Her pain today is 5/10.     Interval History 2/14/2022:  Patient presents to clinic today to discuss a new pain. Patient reports having new onset muscles spasms and severe pain on her left side of her mid to lower back. She reports an increase in her physical activity at home cleaning and doing house work and a day or two later she reports having severe spasms and pain on her left mid-lower back which started 3 days ago. No specific trauma or falls. She has been taking Advil for pain with minor relief. She has been taking Zanaflex from an old prescription with minimal benefit and sedation. She also reports muscle tightness in the same location. Patient reports still experiencing left buttock pain but states this new back pain is worse. Pain in back does not radiate and stays local to mid/lower left back. Pain is sharp and constant, there is no radicular pain of numbness, tingling or weakness. She has tired heating pad to the area with minimal relief of sxs. Patient has  been using Voltaren gel as well with moderate relief. She states she did have an upcoming appointment to have a right ischial bursa injection this week but states this new pain is causing her more issues with her day to day activities and would like to take care of the new pain first prior to getting the MADELINE. Pain today is at a 9/10.     Interval History 2/9/2022:  She is here for follow-up.  She feels that her pain might be coming back.  It is mainly in the right buttock.  She has problems with her left foot and she is requiring reconstructive surgery.  She is putting off the surgery until the repairs and updates are done at her house to be handicap accessible.  She feels that the pain in the right buttock is related to her antalgic gait.  She has problems sitting on a chair and on driving that she has to tilt towards the left side.  There is no radicular symptomatology of tingling, numbness or weakness.  She has a pinpoint pain and tenderness that she points to around the ischial bone on the right side.  Imaging reviewed.     Interval History 11/1/2021:  The patient presents today for follow up of lower back pain. She is now s/p successful lumbar MBBs followed by left then right L3,4,5 RFAs completed on 10/4/21. She is having about 50% relief of back pain. However. She still reports that her back pain is severe and effects her ADLs. She has difficulty with activities that involve walking and bending. She also had limited benefit in the past from bilateral L4/5 TF MADELINE. Her most recent imaging shows multi-level spondylosis most severe at L4/5 where there is Grade 1 anterolisthesis of L4 on L5 with a circumferential disc bulge and superimposed central disc extrusion migrating superiorly and severe bilateral facet arthropathy and ligamentum flavum hypertrophy with several small synovial cysts posteriorly result in severe spinal canal stenosis and moderate left, mild right neural foraminal narrowing. She has not  previously seen neurosurgery. The patient denies any bowel or bladder incontinence or signs of saddle paresthesia.  The patient denies any major medical changes since last office visit.  Her pain today is 7/10.     Interval History 8/10/2021:  The patient presents today for follow-up s/p MADELINE. She states she had only 2% relief since the injection. Her only relief is with 1/4 tab tramadol that she takes at night for pain relief while sleeping. Otherwise her pain and associated sxs are overall unchanged. Her pain today is 7/10.      Interval History 6/17/2021:  The patient has a virtual video follow-up today for back and leg pain.  She previously had no relief with lumbar facet injection he had short-term.  She has a known left shoulder fracture for which she is followed by Orthopedics.  For this reason we are not performing steroid injections at this time.  Her pain today She has a follow up with Dr. Yarbrough on 6/22/21 and was told that she will decide at that time if she can be released for additional back injection.  Her back pain is sharp and stabbing in nature.  She reports radiation down the side and back of both legs.  She reports that this pain usually stops at the knees but she does have tingling to bilateral lower legs.     Interval History 4/27/2021:  The patient is here for follow up of back and leg pain. She is now s/p bilateral L3/4, L4/5 and L5/S1. She had some short term benefit for her back pain but continues with leg pain. Her leg pain is her primary complaint today. Unfortunately since her previous encounter she suffered with a left shoulder fracture on 4/5/21. She has been followed closely by Dr. Yarbrough and is trying to avoid surgery at this time. Her pain today is 7/10.    Interval History 3/10/2021:  The patient is here for follow up of lower back pain. I last saw her in November at which time we discussed bilateral L4/5 and L5/S1 facet injections. However, she contracted Covid and was  unable to have injections. She has had her first vaccine and is scheduled for her second. Today, she is reporting persistent neck and back pain. Her back pain is radiating down the posterior aspects of both legs, stopping at that the knees. She describes pins and needles sensation to her legs. She has significant pain in the morning which she describes as aching. Her leg pain and sensation changes bother her more than her back pain. She is having increased neck pain recently as well. She reporting multiple injuries in the past with resulting whiplash. The pain is across the neck without radiation into the arms. She has associated headaches when her pain is severe. Her pain today is 4/10.      Interval History 11/19/2020:  The patient is here today to discuss lower back pain.  Her pain is mainly located across the lower back and is aching in nature.  She does have intermittent and bilateral posterior leg pain.  She feels as though previous facet joint injections gave her 90% relief for similar back pain in the past for approximately 7 months.  She has been doing physical therapy for her foot in her hip pain and thinks that this really aggravated her back.  She is asking for repeat facet joint injections.  Her pain is worse when she wakes up in the morning when she sits for prolonged time periods.  Her pain today is 4/10.     Interval History 9/16/2020:  Patient is here today for follow-up of right-sided lower back, hip, and leg pain.  She is now s/p right L3/4 and L4/5 TF MADELINE with about 60% relief of severe leg pain.  However, she continues with significant right hip and groin pain.  She plans to make a follow-up with orthopedics at home would.  Additionally, she continues with left posterior foot pain for which she is followed by Dr. Steele. She is currently in physical therapy twice weekly for her hip.  Her pain today is 7/10.  She denies any recent changes in respiratory status such as fever, cough, or shortness  of breath.     Previous Encounter:  Joyce Peterson presents to the clinic for a follow-up appointment for right sided back and hip pain.  She is now s/p right hip injection with no relief, not even short term.  She denies any respiratory changes after the procedure including fever, cough, or SOB.  She did have some relief with lumbar facet injections for back pain in the past.  Her biggest complaint today is right sided back and lateral hip pain with radiation into the front and side of the hip, stopping at the knee.  She denies radiation past the knee at this time.  She denies symptoms on the left. Since the last visit, Joyce Peterson states the pain has been worsening. Current pain intensity is 7/10.       Past Medical History:   Diagnosis Date    Acid reflux     Allergy     Anemia     Asthma     Coronary artery disease     CS (cervical spondylosis) 03/08/2013    Degenerative disc disease     Dry eyes     Dry mouth     Gastroparesis     History of methotrexate therapy 01/19/2022    Hyperlipidemia     Lateral meniscus derangement 04/06/2016    Lobular carcinoma in situ     Lumbar spondylosis 03/08/2013    Osteoarthritis     Osteoporosis     Rheumatoid arthritis(714.0)     Rupture of left triceps tendon 10/17/2018    Umbilical hernia 08/13/2015     Past Surgical History:   Procedure Laterality Date    BREAST BIOPSY Left 01/29/2002    core bx    CARPAL TUNNEL RELEASE Right 05/2017    CHOLECYSTECTOMY  2004    COLONOSCOPY      10/11    COLONOSCOPY N/A 6/29/2017    Procedure: COLONOSCOPY;  Surgeon: López Moore MD;  Location: [facility] ([address] [place] [address]);  Service: Endoscopy;  Laterality: N/A;    EPIDURAL STEROID INJECTION N/A 11/18/2021    Procedure: INJECTION, STEROID, EPIDURAL, L5-S1 IL NEED CONSENT;  Surgeon: Tj Mayfield MD;  Location: [facility] PAIN T;  Service: Pain Management;  Laterality: N/A;    EPIDURAL STEROID INJECTION N/A 9/29/2022    Procedure: LUMBAR L3/L4 IL MADELINE CONTRAST;  Surgeon: Tj Mayfield MD;   Location: [facility] PAIN MGT;  Service: Pain Management;  Laterality: N/A;    EPIDURAL STEROID INJECTION N/A 12/12/2022    Procedure: INJECTION, STEROID, EPIDURAL L5/S1 IL CONTRAST;  Surgeon: Tj Mayfield MD;  Location: [facility] PAIN MGT;  Service: Pain Management;  Laterality: N/A;    FOOT ARTHRODESIS Left 1/11/2023    Procedure: FUSION, FOOT;  Surgeon: Ariella Finnegan MD;  Location: [facility] OR;  Service: Orthopedics;  [facility]ality[facility];  [facility]    HARVESTING OF BONE GRAFT Left 1/11/2023    Procedure: SURGICAL PROCUREMENT, BONE GRAFT;  Surgeon: Ariella Finnegan MD;  Location: [facility] OR;  Service: Orthopedics;  Laterality: Left;    INJECTION OF ANESTHETIC AGENT AROUND NERVE Bilateral 8/23/2021    Procedure: BLOCK, NERVE, MEDIAL BRANCH L3,L4,L5;  Surgeon: Tj Mayfield MD;  Location: [facility] PAIN MGT;  Service: Pain Management;  Laterality: Bilateral;  1 of 2    INJECTION OF ANESTHETIC AGENT AROUND NERVE Bilateral 8/26/2021    Procedure: BLOCK, NERVE, MEDIAL BRANCH L3,L4,L5;  Surgeon: Tj Mayfield MD;  Location: [facility] PAIN MGT;  Service: Pain Management;  Laterality: Bilateral;  2 of 2    INJECTION OF ANESTHETIC AGENT AROUND NERVE Left 7/7/2022    Procedure: BLOCK, NERVE, LEFT OBTURATOR AND FEMORAL;  Surgeon: Tj Mayfield MD;  Location: [facility] PAIN MGT;  Service: Pain Management;  Laterality: Left;    INJECTION OF FACET JOINT Bilateral 3/12/2020    Procedure: FACET JOINT INJECTION (LUMBAR BLOCK) BILATERAL L4-5 AND L5-S1 DIRECT REFERRAL;  Surgeon: Tj Mayfield MD;  Location: [facility] PAIN MGT;  Service: Pain Management;  Laterality: Bilateral;  NEEDS CONSENT    INJECTION OF FACET JOINT Bilateral 3/29/2021    Procedure: INJECTION, FACET JOINT L3/4, L4/5, L5/S1;  Surgeon: Tj Mayfield MD;  Location: [facility] PAIN MGT;  Service: Pain Management;  Laterality: Bilateral;    INJECTION OF JOINT Right 7/30/2020    Procedure: INJECTION, JOINT, RIGHT HIP Interarticular under flouro;  Surgeon: Tj Mayfield MD;  Location: [facility] PAIN MGT;  Service: Pain  Management;  Laterality: Right;  INJECTION, JOINT, RIGHT HIP Interarticular under flouro    INJECTION OF JOINT Right 2/21/2022    Procedure: Injection, Joint RIGHT ISCHIAL BURSA;  Surgeon: Tj Mayfield MD;  Location: [facility] PAIN MGT;  Service: Pain Management;  Laterality: Right;    INTRAMEDULLARY RODDING OF FEMUR Left 5/21/2019    Procedure: INSERTION, INTRAMEDULLARY ARIEL, FEMUR;  Surgeon: López Duff MD;  Location: [facility] OR 2ND FLR;  Service: Orthopedics;  Laterality: Left;    LENGTHENING OF TENDON OF LOWER EXTREMITY Left 1/11/2023    Procedure: LENGTHENING, TENDON, LOWER EXTREMITY;  Surgeon: Ariella Finnegan MD;  Location: [facility] OR;  Service: Orthopedics;  Laterality: Left;    RADIOFREQUENCY ABLATION Left 9/20/2021    Procedure: RADIOFREQUENCY ABLATION left L3,4,5 RFA  1 of 2  CONSENT NEEDED;  Surgeon: Tj Mayfield MD;  Location: [facility] PAIN MGT;  Service: Pain Management;  Laterality: Left;    RADIOFREQUENCY ABLATION Right 10/4/2021    Procedure: RADIOFREQUENCY ABLATION  right L3,4,5 RFA   2 of 2  CONSENT NEEDED;  Surgeon: Tj Mayfield MD;  Location: [facility] PAIN MGT;  Service: Pain Management;  Laterality: Right;    RADIOFREQUENCY ABLATION Left 4/21/2022    Procedure: Radiofrequency Ablation LEFT L3,L4,L5;  Surgeon: Tj Mayfield MD;  Location: [facility] PAIN MGT;  Service: Pain Management;  Laterality: Left;    RADIOFREQUENCY ABLATION Right 5/12/2022    Procedure: Radiofrequency Ablation RIGHT L3,L4,L5;  Surgeon: Tj Mayfield MD;  Location: [facility] PAIN MGT;  Service: Pain Management;  Laterality: Right;    RADIOFREQUENCY ABLATION Left 7/25/2022    Procedure: Radiofrequency Ablation Left Femoral & Obturator;  Surgeon: Tj Mayfield MD;  Location: [facility] PAIN MGT;  Service: Pain Management;  Laterality: Left;    REPAIR OF TRICEPS TENDON Left 10/17/2018    Procedure: REPAIR, TENDON, TRICEPS left elbow;  Surgeon: Staci Yarbrough MD;  Location: [facility] OR 1ST FLR;  Service: Orthopedics;  Laterality: Left;  Anesthesia: General  "and regional. PRONE, k-wire , hand pan 1 and pan 2, CALL ARTHREX/Tamera notified 10-12 LO    TONSILLECTOMY      TRANSFORAMINAL EPIDURAL INJECTION OF STEROID Right 8/31/2020    Procedure: INJECTION, STEROID, EPIDURAL, TRANSFORAMINAL,  APPROACH, L3-L4 and L4-L5 need consent;  Surgeon: Tj Mayfield MD;  Location: [facility] PAIN MGT;  Service: Pain Management;  Laterality: Right;    TRANSFORAMINAL EPIDURAL INJECTION OF STEROID Bilateral 7/23/2021    Procedure: INJECTION, STEROID, EPIDURAL, TRANSFORAMINAL APPROACH L4/5;  Surgeon: Tj Mayfield MD;  Location: [facility] PAIN MGT;  Service: Pain Management;  Laterality: Bilateral;    TRIGGER POINT INJECTION N/A 7/23/2021    Procedure: INJECTION, TRIGGER POINT SCAPULAR;  Surgeon: Tj Mayfield MD;  Location: [facility] PAIN MGT;  Service: Pain Management;  Laterality: N/A;    TUBAL LIGATION  2003    UPPER GASTROINTESTINAL ENDOSCOPY      10/11    uterine ablation  2003     Review of patient's allergies indicates:   Allergen Reactions    Actemra [tocilizumab] Other (See Comments)     Severe dizziness    Codeine Nausea And Vomiting and Hives    Gold au 198 Hives and Rash    Hydroxychloroquine Other (See Comments)     Can't remember the reaction      Iodinated contrast media Other (See Comments)     Other reaction(s): BURNING ALL OVER    Iodine Other (See Comments) and Hives     Other reaction(s): BURNING ALL OVER - Iodine dye - Not topical    Ondansetron Nausea And Vomiting    Sulfa (sulfonamide antibiotics) Other (See Comments)     Can't remember the reaction    Zofran [ondansetron hcl (pf)] Nausea And Vomiting     Pt reports that last time she received zofran she started vomiting again    Methotrexate analogues Nausea Only    Pneumococcal vaccine Other (See Comments)    Pneumovax 23 [pneumococcal 23-argenis ps vaccine] Other (See Comments)     "sick"    Methotrexate Nausea Only       Current Outpatient Medications on File Prior to Visit   Medication Sig Dispense Refill    albuterol " (PROVENTIL/VENTOLIN HFA) 90 mcg/actuation inhaler INHALE 2 PUFFS INTO THE LUNGS EVERY 6 (SIX) HOURS AS NEEDED. RESCUE 20.1 g 11    albuterol-ipratropium (DUO-NEB) 2.5 mg-0.5 mg/3 mL nebulizer solution Take 3 mLs by nebulization every 6 (six) hours as needed for Wheezing. Rescue 90 mL 3    ALPRAZolam (XANAX) 0.5 MG tablet Take Once on call to procedure 1 tablet 0    aspirin 325 MG tablet Take 1 tablet (325 mg total) by mouth once daily. 30 tablet 0    budesonide-formoterol 160-4.5 mcg (SYMBICORT) 160-4.5 mcg/actuation HFAA INHALE 2 PUFFS INTO THE LUNGS EVERY 12 (TWELVE) HOURS. 30.6 g 3    calcium citrate-vitamin D3 315-200 mg (CITRACAL+D) 315 mg-5 mcg (200 unit) per tablet Take 1 tablet by mouth 2 (two) times daily.       cycloSPORINE (RESTASIS) 0.05 % ophthalmic emulsion Instill one drop into both eyes two times per day 180 each 3    diazePAM (VALIUM) 10 MG Tab Take 1 tablet (10 mg total) by mouth once as needed. 1 tablet 0    diclofenac sodium (VOLTAREN) 1 % Gel APPLY 2 GRAMS TOPICALLY 4 (FOUR) TIMES DAILY AS NEEDED. 300 g 2    estrogens, conjugated, (PREMARIN) 0.625 MG tablet take 1 tablet by mouth daily 90 tablet 4    halobetasol propion-tazarotene (DUOBRII) 0.01-0.045 % Lotn aaa on feet and hands qhs  then vaseline and warm towel (Patient taking differently: aaa on feet and hands qhs  then vaseline and warm towel) 100 g 3    INV PROPULSID 10 MG Take 2 tablets (20 mg) by mouth 4 (four) times daily. FOR INVESTIGATIONAL USE ONLY. Protocol [id number]  Subject ID: [id number]. 1440 each 0    ketoconazole (NIZORAL) 2 % cream Apply to feet twice daily for 3 weeks (Patient taking differently: Apply to feet twice daily for 3 weeks) 60 g 3    leflunomide (ARAVA) 20 MG Tab Take 1 tablet (20 mg total) by mouth once daily. 90 tablet 0    lifitegrast (XIIDRA) 5 % Dpet INSTILL ONE DROP INTO BOTH EYES TWICE A DAY 60 mL 10    magic mouthwash diphen/antac/lidoc Swish and Spit 5 mL by mouth for 30 seconds twice daily. 150 mL 0     methenamine (HIPREX) 1 gram Tab Take 1 tablet (1 g total) by mouth 2 (two) times daily. 180 tablet 3    methocarbamoL (ROBAXIN) 750 MG Tab Take 1 tablet (750 mg total) by mouth 3 (three) times daily as needed (muscle spasm). 42 tablet 0    mirabegron (MYRBETRIQ) 25 mg Tb24 ER tablet Take 1 tablet (25 mg total) by mouth once daily. 90 tablet 3    montelukast (SINGULAIR) 10 mg tablet TAKE 1 TABLET BY MOUTH EVERY DAY AT NIGHT 90 tablet 3    omeprazole (PRILOSEC) 40 MG capsule Take 1 capsule (40 mg total) by mouth every morning. 30 capsule 6    omeprazole-sodium bicarbonate (ZEGERID) 40-1.1 mg-gram per capsule TAKE 1 CAPSULE BY MOUTH EVERY DAY IN THE MORNING 90 capsule 3    POTASSIUM ORAL Take by mouth once daily.      predniSONE (DELTASONE) 5 MG tablet Take 1 tablet (5 mg total) by mouth once daily. 90 tablet 1    pregabalin (LYRICA) 50 MG capsule Take 3 capsules (150 mg total) by mouth every evening. (Patient taking differently: Take 100 mg by mouth every evening.) 270 capsule 1    PREMARIN vaginal cream PLACE 0.5 GRAM VAGINALLY 3 (THREE) TIMES A WEEK. 30 g 1    progesterone (PROMETRIUM) 100 MG capsule Take 1 capsule by mouth daily. 90 capsule 1    progesterone (PROMETRIUM) 100 MG capsule take 1 capsule by mouth daily 90 capsule 4    promethazine (PHENERGAN) 25 MG tablet Take 1 tablet (25 mg total) by mouth every 6 (six) hours as needed for Nausea. 30 tablet 1    promethazine (PHENERGAN) 25 MG tablet Take 1 tablet (25 mg total) by mouth every 6 (six) hours as needed for Nausea. 15 tablet 0    rosuvastatin (CRESTOR) 20 MG tablet Take 1 tablet (20 mg total) by mouth every evening. 90 tablet 3    sarilumab (KEVZARA) 200 mg/1.14 mL Syrg Inject 1.14 mL (200 mg total) into the skin every 14 (fourteen) days. 2.28 mL 2    sucralfate (CARAFATE) 1 gram tablet TAKE 1 TABLET (1 G TOTAL) BY MOUTH 4 (FOUR) TIMES DAILY. 360 tablet 2    sucralfate (CARAFATE) 1 gram tablet Take 1 tablet (1 g total) by mouth 4 (four) times daily. 360  tablet 3     No current facility-administered medications on file prior to visit.     Social History     Socioeconomic History    Marital status:    Tobacco Use    Smoking status: Never    Smokeless tobacco: Never   Substance and Sexual Activity    Alcohol use: Yes     Comment: 2-3 times per year.    Drug use: No    Sexual activity: Yes     Partners: Male     Birth control/protection: Surgical     Social Determinants of Health     Financial Resource Strain: Low Risk     Difficulty of Paying Living Expenses: Not hard at all   Food Insecurity: Unknown    Worried About Running Out of Food in the Last Year: Patient refused    Ran Out of Food in the Last Year: Patient refused   Transportation Needs: Unknown    Lack of Transportation (Medical): Patient refused    Lack of Transportation (Non-Medical): Patient refused   Physical Activity: Unknown    Days of Exercise per Week: 2 days   Stress: No Stress Concern Present    Feeling of Stress : Not at all   Social Connections: Unknown    Frequency of Communication with Friends and Family: Twice a week    Frequency of Social Gatherings with Friends and Family: Twice a week    Active Member of Clubs or Organizations: Yes    Attends Club or Organization Meetings: Patient refused    Marital Status:    Housing Stability: Unknown    Unable to Pay for Housing in the Last Year: No    Unstable Housing in the Last Year: No     Review of Systems   Constitutional:  Negative for fever, malaise/fatigue and weight loss.   HENT:  Negative for congestion and hearing loss.    Eyes:  Negative for blurred vision and redness.   Respiratory:  Negative for cough and shortness of breath.    Cardiovascular:  Negative for chest pain, palpitations and leg swelling.   Gastrointestinal:  Negative for abdominal pain, diarrhea, heartburn, nausea and vomiting.   Genitourinary:  Negative for dysuria, flank pain and hematuria.   Musculoskeletal:  Positive for back pain. Negative for joint pain,  myalgias and neck pain.        Foot pain improved    Skin:  Negative for rash.   Neurological:  Negative for dizziness, tingling, tremors, sensory change, focal weakness, seizures, loss of consciousness, weakness and headaches.   Endo/Heme/Allergies:  Does not bruise/bleed easily.   Psychiatric/Behavioral:  Negative for depression, hallucinations, substance abuse and suicidal ideas. The patient is not nervous/anxious and does not have insomnia.      Vitals:    03/08/23 1448   BP: 108/68   Pulse: 98   Temp: 98.7 °F (37.1 °C)     Physical Exam  HENT:      Head: Normocephalic and atraumatic.   Eyes:      Conjunctiva/sclera: Conjunctivae normal.      Pupils: Pupils are equal, round, and reactive to light.   Cardiovascular:      Rate and Rhythm: Normal rate and regular rhythm.   Pulmonary:      Effort: Pulmonary effort is normal.      Breath sounds: Normal breath sounds.   Abdominal:      General: Bowel sounds are normal.      Palpations: Abdomen is soft.   Musculoskeletal:         General: No swelling, tenderness, deformity or signs of injury. Normal range of motion.      Cervical back: Normal range of motion and neck supple.      Lumbar back: Normal. No swelling, edema, deformity, signs of trauma or lacerations. Normal range of motion.      Right lower leg: No edema.      Left lower leg: No edema.   Skin:     General: Skin is warm and dry.   Neurological:      Mental Status: She is alert and oriented to person, place, and time.      Gait: Gait is intact.   Psychiatric:         Mood and Affect: Mood and affect normal.         Cognition and Memory: Memory normal.         Judgment: Judgment normal.       A/P   - Reports recurrence of back pain. Epidural injection helped considerably. Will plan to repeat for pain relief.     1. Osteoarthritis of spine with radiculopathy, lumbar region  -     Procedure Order to Pain Management; Future; Expected date: 03/08/2023    2. Spinal stenosis of lumbar region, unspecified whether  neurogenic claudication present  -     Procedure Order to Pain Management; Future; Expected date: 03/08/2023      F/u procedure unit.   We will schedule the patient for an interlaminar epidural at L5/S1.  Patient said that worked very well until she had trouble with her left foot postoperatively.  I have personally taken the history and examined this patient and agree with the resident's note as stated above.

## 2023-03-08 NOTE — H&P (VIEW-ONLY)
62 year old female with hx of RA, Sjogren syndrome, lumbar radiculopathy is here for follow up regarding her back pain. Pt is status post left foot fusion surgery on 01/11/2023. Had neuropathic symptoms after at the foot, but that has improved. Here complaining of back pain again.     Onset: It started after she started again 6 weeks after foot surgery when she attempted to walk.   Location: Lower back radiating to b/l knees.    Duration:  Whenever she walks and prolong sitting/lying.   Character: needles sensation/shooting pain when stands up walking and achy pain when she lies down.    Aggravating Factor: Standing still and walking.   Relieving factor: Heat helps a bit;   Pertinent negatives: Denies swelling, erythema, urinary/bowel incontinence, fever, night sweat, weakness.   Treatment: Tramadol; Has not tried PT recently; tried previously, but helped minimally.   Severity: 6/10 normally; 8-9/10 when worse; 4/10 when better.   Background: Has had ablation and steroid shots, which helped the patient. The effect would last about 4-6 months.         Interval History 12/27/2022:  The patient has a virtual visit for follow up of back pain. She is now s/p L5/S1 on 12/12/22 with about 80% relief. She feels like this was more effective that previous injections for her back and leg pain. She is scheduled for left foot surgery next month. She continues with right shoulder pain and is followed by Dr. Yarbrough. She plans for surgical repair in the future after she recovers from foot surgery. Her back pain is overall tolerable at this time. Her pain today is 2/10.     Interval History 11/10/2022:  The patient has a virtual visit for follow up of back pain. She says that L3/4 IL MADELINE did provide approximately 80% relief of back pain. Her back pain has improved and is tolerable at this time which she is happy about. However, she is having more shooting pain to posterior legs which is burning in nature. She saw Dr. Yarbrough  this week for follow up of right shoulder pain and MRI was ordered. She is scheduled for this in the future. She is also having foot surgery in January. Her pain today is 5/10.     Interval History 10/4/2022:  Joyce is here for worsened back pain. She is s/p L3/4 IL MADELINE on 9/29/22 but is not sure how much it helped. She continues to have significant pain over the back with radiation into the legs. Hip pain significantly improved with RFA. Right knee pain significantly improved with recent right knee steroid injection from orthopedics. She did have benefit with PT in the past and would like to restart this. Her pain today is 4/10.     Interval History 08/16/2022:  Patient returns to clinic for a follow up after her left femoral and left obturator coolief for left hip pain on 7/25/22. She reports 90% improvement in her pain, is now able to walk and do most of her activities. She is working now on building up her strength and stamina. Has started back with physical therapy now that her pain is improved, which she has found very helpful. She has lost about 15lbs since becoming more active. She is now concerned about her back pain. She is having pain in her low back that radiates down in the posterior thighs without radiation past the knees.      Pt with CT thigh in June showing Nondisplaced perihardware fracture of the proximal left femur. Pt discussed with Ortho on 8/9/22 and given significant functional improvement after RFA, recommending continued conservative management at this time.      Interval History 7/5/2022:  Patient states that she continues to have pain when she sleeps.  She states that robaxin helps slightly but doesn't get rid of pain.  Toradol shot didn't help much.  She takes robaxin, naproxen, tramadol, and advil.  Pain continues to be in the left thigh.  She describes a burning/numbness radiating from lower back to thigh and front of lower leg.  Pain started 2 weeks ago (prior to her last visit).   Patient states she uses a walker because she feels shaky and weak.  No bowel/bladder incontinence.  She feels numbness in her lowe leg.  Pain is isolated to left leg (no pain on right).      Interval History 6/24/2022:  The patient returns to discuss increased left hip and leg pain.  I saw her earlier this month at which time she had reported a fall.  At that time, she was having more right knee and buttock pain.  We scheduled her for a repeat ischial bursa injection for next week.  She had knee x-rays which did not show any significant abnormality.  She then called back with continued knee pain I ordered an MRI which has not yet been scheduled.  However, she is here today to discuss left hip pain.  The pain started suddenly when she was sleeping 2 nights ago.  She denies any other injury when this started.  She did see Dr. Farfan in her recently to review hip x-rays which showed possible small fracture of which he said there was not much to do for repair.  However at that time, she was not having severe pain.  She has been having difficulty with ambulation and any activity.  She presents today.  Her pain is sharp and severe to the left lateral hip and groin.  It worsens with standing and walking.  She has Norco at home which she tried with minimal benefit.  She also says this makes her itch.  She also tried tramadol which was not helpful.  Her pain today is 10/10.     Interval History 6/9/2022:  The patient returns for follow up of back pain. She underwent left L3,4,5 RFA on 4/21/22 with 80% relief initially. Unfortunately, she had 2 falls close after this time. She tripped while walking and fell onto her right knee. She had another injury in which she jumped from bed tangled in her sheets and fell on her left hip. She did go to the ED in [place] at that time with imaging. She also discussed with Dr. Duff who said that her hardware looked good. She then underwent right L3,4,5 RFA on 5/12/22 with 80% relief  initially. She also reports that 3 days ago she was lifting a beach chair and had a sudden onset of right posterior leg pain. She says she heard a pop at that time. She feels a lot of pain to her right lower buttock with radiation. It worsens when she sits and walks. Her overall pain today is 8/10.     Interval History 3/8/2022:  The patient presents for follow up of back, buttock and leg pain.  He is she is status post right ischial bursa injection 02/21/2022 80% relief of this pain.  She also had trigger point injections her last office visit which provided significant relief of muscle pain.  Her primary complaint today is aching pain across the lower back without radiation.  She has significant pain and stiffness in the mornin8.  This feels similar to pain that had good relief with radiofrequency ablations last year.  She wishes to repeat this in the future.  No new complaints at this time.  She takes tramadol as needed when the pain is severe. Her pain today is 5/10.     Interval History 2/14/2022:  Patient presents to clinic today to discuss a new pain. Patient reports having new onset muscles spasms and severe pain on her left side of her mid to lower back. She reports an increase in her physical activity at home cleaning and doing house work and a day or two later she reports having severe spasms and pain on her left mid-lower back which started 3 days ago. No specific trauma or falls. She has been taking Advil for pain with minor relief. She has been taking Zanaflex from an old prescription with minimal benefit and sedation. She also reports muscle tightness in the same location. Patient reports still experiencing left buttock pain but states this new back pain is worse. Pain in back does not radiate and stays local to mid/lower left back. Pain is sharp and constant, there is no radicular pain of numbness, tingling or weakness. She has tired heating pad to the area with minimal relief of sxs. Patient has  been using Voltaren gel as well with moderate relief. She states she did have an upcoming appointment to have a right ischial bursa injection this week but states this new pain is causing her more issues with her day to day activities and would like to take care of the new pain first prior to getting the MADELINE. Pain today is at a 9/10.     Interval History 2/9/2022:  She is here for follow-up.  She feels that her pain might be coming back.  It is mainly in the right buttock.  She has problems with her left foot and she is requiring reconstructive surgery.  She is putting off the surgery until the repairs and updates are done at her house to be handicap accessible.  She feels that the pain in the right buttock is related to her antalgic gait.  She has problems sitting on a chair and on driving that she has to tilt towards the left side.  There is no radicular symptomatology of tingling, numbness or weakness.  She has a pinpoint pain and tenderness that she points to around the ischial bone on the right side.  Imaging reviewed.     Interval History 11/1/2021:  The patient presents today for follow up of lower back pain. She is now s/p successful lumbar MBBs followed by left then right L3,4,5 RFAs completed on 10/4/21. She is having about 50% relief of back pain. However. She still reports that her back pain is severe and effects her ADLs. She has difficulty with activities that involve walking and bending. She also had limited benefit in the past from bilateral L4/5 TF MADELINE. Her most recent imaging shows multi-level spondylosis most severe at L4/5 where there is Grade 1 anterolisthesis of L4 on L5 with a circumferential disc bulge and superimposed central disc extrusion migrating superiorly and severe bilateral facet arthropathy and ligamentum flavum hypertrophy with several small synovial cysts posteriorly result in severe spinal canal stenosis and moderate left, mild right neural foraminal narrowing. She has not  previously seen neurosurgery. The patient denies any bowel or bladder incontinence or signs of saddle paresthesia.  The patient denies any major medical changes since last office visit.  Her pain today is 7/10.     Interval History 8/10/2021:  The patient presents today for follow-up s/p MADELINE. She states she had only 2% relief since the injection. Her only relief is with 1/4 tab tramadol that she takes at night for pain relief while sleeping. Otherwise her pain and associated sxs are overall unchanged. Her pain today is 7/10.      Interval History 6/17/2021:  The patient has a virtual video follow-up today for back and leg pain.  She previously had no relief with lumbar facet injection he had short-term.  She has a known left shoulder fracture for which she is followed by Orthopedics.  For this reason we are not performing steroid injections at this time.  Her pain today She has a follow up with Dr. Yarbrough on 6/22/21 and was told that she will decide at that time if she can be released for additional back injection.  Her back pain is sharp and stabbing in nature.  She reports radiation down the side and back of both legs.  She reports that this pain usually stops at the knees but she does have tingling to bilateral lower legs.     Interval History 4/27/2021:  The patient is here for follow up of back and leg pain. She is now s/p bilateral L3/4, L4/5 and L5/S1. She had some short term benefit for her back pain but continues with leg pain. Her leg pain is her primary complaint today. Unfortunately since her previous encounter she suffered with a left shoulder fracture on 4/5/21. She has been followed closely by Dr. Yarbrough and is trying to avoid surgery at this time. Her pain today is 7/10.    Interval History 3/10/2021:  The patient is here for follow up of lower back pain. I last saw her in November at which time we discussed bilateral L4/5 and L5/S1 facet injections. However, she contracted Covid and was  unable to have injections. She has had her first vaccine and is scheduled for her second. Today, she is reporting persistent neck and back pain. Her back pain is radiating down the posterior aspects of both legs, stopping at that the knees. She describes pins and needles sensation to her legs. She has significant pain in the morning which she describes as aching. Her leg pain and sensation changes bother her more than her back pain. She is having increased neck pain recently as well. She reporting multiple injuries in the past with resulting whiplash. The pain is across the neck without radiation into the arms. She has associated headaches when her pain is severe. Her pain today is 4/10.      Interval History 11/19/2020:  The patient is here today to discuss lower back pain.  Her pain is mainly located across the lower back and is aching in nature.  She does have intermittent and bilateral posterior leg pain.  She feels as though previous facet joint injections gave her 90% relief for similar back pain in the past for approximately 7 months.  She has been doing physical therapy for her foot in her hip pain and thinks that this really aggravated her back.  She is asking for repeat facet joint injections.  Her pain is worse when she wakes up in the morning when she sits for prolonged time periods.  Her pain today is 4/10.     Interval History 9/16/2020:  Patient is here today for follow-up of right-sided lower back, hip, and leg pain.  She is now s/p right L3/4 and L4/5 TF MADELINE with about 60% relief of severe leg pain.  However, she continues with significant right hip and groin pain.  She plans to make a follow-up with orthopedics at home would.  Additionally, she continues with left posterior foot pain for which she is followed by Dr. Steele. She is currently in physical therapy twice weekly for her hip.  Her pain today is 7/10.  She denies any recent changes in respiratory status such as fever, cough, or shortness  of breath.     Previous Encounter:  Joyce Peterson presents to the clinic for a follow-up appointment for right sided back and hip pain.  She is now s/p right hip injection with no relief, not even short term.  She denies any respiratory changes after the procedure including fever, cough, or SOB.  She did have some relief with lumbar facet injections for back pain in the past.  Her biggest complaint today is right sided back and lateral hip pain with radiation into the front and side of the hip, stopping at the knee.  She denies radiation past the knee at this time.  She denies symptoms on the left. Since the last visit, Joyce Peterson states the pain has been worsening. Current pain intensity is 7/10.       Past Medical History:   Diagnosis Date    Acid reflux     Allergy     Anemia     Asthma     Coronary artery disease     CS (cervical spondylosis) 03/08/2013    Degenerative disc disease     Dry eyes     Dry mouth     Gastroparesis     History of methotrexate therapy 01/19/2022    Hyperlipidemia     Lateral meniscus derangement 04/06/2016    Lobular carcinoma in situ     Lumbar spondylosis 03/08/2013    Osteoarthritis     Osteoporosis     Rheumatoid arthritis(714.0)     Rupture of left triceps tendon 10/17/2018    Umbilical hernia 08/13/2015     Past Surgical History:   Procedure Laterality Date    BREAST BIOPSY Left 01/29/2002    core bx    CARPAL TUNNEL RELEASE Right 05/2017    CHOLECYSTECTOMY  2004    COLONOSCOPY      10/11    COLONOSCOPY N/A 6/29/2017    Procedure: COLONOSCOPY;  Surgeon: López Moore MD;  Location: [facility] ([address] [place] [address]);  Service: Endoscopy;  Laterality: N/A;    EPIDURAL STEROID INJECTION N/A 11/18/2021    Procedure: INJECTION, STEROID, EPIDURAL, L5-S1 IL NEED CONSENT;  Surgeon: Tj Mayfield MD;  Location: [facility] PAIN T;  Service: Pain Management;  Laterality: N/A;    EPIDURAL STEROID INJECTION N/A 9/29/2022    Procedure: LUMBAR L3/L4 IL MADELINE CONTRAST;  Surgeon: Tj Mayfield MD;   Location: [facility] PAIN MGT;  Service: Pain Management;  Laterality: N/A;    EPIDURAL STEROID INJECTION N/A 12/12/2022    Procedure: INJECTION, STEROID, EPIDURAL L5/S1 IL CONTRAST;  Surgeon: Tj Mayfield MD;  Location: [facility] PAIN MGT;  Service: Pain Management;  Laterality: N/A;    FOOT ARTHRODESIS Left 1/11/2023    Procedure: FUSION, FOOT;  Surgeon: Ariella Finnegan MD;  Location: [facility] OR;  Service: Orthopedics;  [facility]    HARVESTING OF BONE GRAFT Left 1/11/2023    Procedure: SURGICAL PROCUREMENT, BONE GRAFT;  Surgeon: Ariella Finnegan MD;  Location: [facility] OR;  Service: Orthopedics;  Laterality: Left;    INJECTION OF ANESTHETIC AGENT AROUND NERVE Bilateral 8/23/2021    Procedure: BLOCK, NERVE, MEDIAL BRANCH L3,L4,L5;  Surgeon: Tj Mayfield MD;  Location: [facility] PAIN MGT;  Service: Pain Management;  Laterality: Bilateral;  1 of 2    INJECTION OF ANESTHETIC AGENT AROUND NERVE Bilateral 8/26/2021    Procedure: BLOCK, NERVE, MEDIAL BRANCH L3,L4,L5;  Surgeon: Tj Mayfield MD;  Location: [facility] PAIN MGT;  Service: Pain Management;  Laterality: Bilateral;  2 of 2    INJECTION OF ANESTHETIC AGENT AROUND NERVE Left 7/7/2022    Procedure: BLOCK, NERVE, LEFT OBTURATOR AND FEMORAL;  Surgeon: Tj Mayfield MD;  Location: [facility] PAIN MGT;  Service: Pain Management;  Laterality: Left;    INJECTION OF FACET JOINT Bilateral 3/12/2020    Procedure: FACET JOINT INJECTION (LUMBAR BLOCK) BILATERAL L4-5 AND L5-S1 DIRECT REFERRAL;  Surgeon: Tj Mayfield MD;  Location: [facility] PAIN MGT;  Service: Pain Management;  Laterality: Bilateral;  NEEDS CONSENT    INJECTION OF FACET JOINT Bilateral 3/29/2021    Procedure: INJECTION, FACET JOINT L3/4, L4/5, L5/S1;  Surgeon: Tj Mayfield MD;  Location: [facility] PAIN MGT;  Service: Pain Management;  Laterality: Bilateral;    INJECTION OF JOINT Right 7/30/2020    Procedure: INJECTION, JOINT, RIGHT HIP Interarticular under flouro;  Surgeon: Tj Mayfield MD;  Location: [facility] PAIN MGT;  Service: Pain  Management;  Laterality: Right;  INJECTION, JOINT, RIGHT HIP Interarticular under flouro    INJECTION OF JOINT Right 2/21/2022    Procedure: Injection, Joint RIGHT ISCHIAL BURSA;  Surgeon: Tj Mayfield MD;  Location: [facility] PAIN MGT;  Service: Pain Management;  Laterality: Right;    INTRAMEDULLARY RODDING OF FEMUR Left 5/21/2019    Procedure: INSERTION, INTRAMEDULLARY ARIEL, FEMUR;  Surgeon: López Duff MD;  Location: [facility] OR 2ND FLR;  Service: Orthopedics;  Laterality: Left;    LENGTHENING OF TENDON OF LOWER EXTREMITY Left 1/11/2023    Procedure: LENGTHENING, TENDON, LOWER EXTREMITY;  Surgeon: Ariella Finnegan MD;  Location: [facility] OR;  Service: Orthopedics;  Laterality: Left;    RADIOFREQUENCY ABLATION Left 9/20/2021    Procedure: RADIOFREQUENCY ABLATION left L3,4,5 RFA  1 of 2  CONSENT NEEDED;  Surgeon: Tj Mayfield MD;  Location: [facility] PAIN MGT;  Service: Pain Management;  Laterality: Left;    RADIOFREQUENCY ABLATION Right 10/4/2021    Procedure: RADIOFREQUENCY ABLATION  right L3,4,5 RFA   2 of 2  CONSENT NEEDED;  Surgeon: Tj Mayfield MD;  Location: [facility] PAIN MGT;  Service: Pain Management;  Laterality: Right;    RADIOFREQUENCY ABLATION Left 4/21/2022    Procedure: Radiofrequency Ablation LEFT L3,L4,L5;  Surgeon: Tj Mayfield MD;  Location: [facility] PAIN MGT;  Service: Pain Management;  Laterality: Left;    RADIOFREQUENCY ABLATION Right 5/12/2022    Procedure: Radiofrequency Ablation RIGHT L3,L4,L5;  Surgeon: Tj Mayfield MD;  Location: [facility] PAIN MGT;  Service: Pain Management;  Laterality: Right;    RADIOFREQUENCY ABLATION Left 7/25/2022    Procedure: Radiofrequency Ablation Left Femoral & Obturator;  Surgeon: Tj Mayfield MD;  Location: [facility] PAIN MGT;  Service: Pain Management;  Laterality: Left;    REPAIR OF TRICEPS TENDON Left 10/17/2018    Procedure: REPAIR, TENDON, TRICEPS left elbow;  Surgeon: Staci Yarbrough MD;  Location: [facility] OR 1ST FLR;  Service: Orthopedics;  Laterality: Left;  Anesthesia: General  "and regional. PRONE, k-wire , hand pan 1 and pan 2, CALL ARTHREX/Tamera notified 10-12 LO    TONSILLECTOMY      TRANSFORAMINAL EPIDURAL INJECTION OF STEROID Right 8/31/2020    Procedure: INJECTION, STEROID, EPIDURAL, TRANSFORAMINAL,  APPROACH, L3-L4 and L4-L5 need consent;  Surgeon: Tj Mayfield MD;  Location: [facility] PAIN MGT;  Service: Pain Management;  Laterality: Right;    TRANSFORAMINAL EPIDURAL INJECTION OF STEROID Bilateral 7/23/2021    Procedure: INJECTION, STEROID, EPIDURAL, TRANSFORAMINAL APPROACH L4/5;  Surgeon: Tj Mayfield MD;  Location: [facility] PAIN MGT;  Service: Pain Management;  Laterality: Bilateral;    TRIGGER POINT INJECTION N/A 7/23/2021    Procedure: INJECTION, TRIGGER POINT SCAPULAR;  Surgeon: Tj Mayfield MD;  Location: [facility] PAIN MGT;  Service: Pain Management;  Laterality: N/A;    TUBAL LIGATION  2003    UPPER GASTROINTESTINAL ENDOSCOPY      10/11    uterine ablation  2003     Review of patient's allergies indicates:   Allergen Reactions    Actemra [tocilizumab] Other (See Comments)     Severe dizziness    Codeine Nausea And Vomiting and Hives    Gold au 198 Hives and Rash    Hydroxychloroquine Other (See Comments)     Can't remember the reaction      Iodinated contrast media Other (See Comments)     Other reaction(s): BURNING ALL OVER    Iodine Other (See Comments) and Hives     Other reaction(s): BURNING ALL OVER - Iodine dye - Not topical    Ondansetron Nausea And Vomiting    Sulfa (sulfonamide antibiotics) Other (See Comments)     Can't remember the reaction    Zofran [ondansetron hcl (pf)] Nausea And Vomiting     Pt reports that last time she received zofran she started vomiting again    Methotrexate analogues Nausea Only    Pneumococcal vaccine Other (See Comments)    Pneumovax 23 [pneumococcal 23-argenis ps vaccine] Other (See Comments)     "sick"    Methotrexate Nausea Only       Current Outpatient Medications on File Prior to Visit   Medication Sig Dispense Refill    albuterol " (PROVENTIL/VENTOLIN HFA) 90 mcg/actuation inhaler INHALE 2 PUFFS INTO THE LUNGS EVERY 6 (SIX) HOURS AS NEEDED. RESCUE 20.1 g 11    albuterol-ipratropium (DUO-NEB) 2.5 mg-0.5 mg/3 mL nebulizer solution Take 3 mLs by nebulization every 6 (six) hours as needed for Wheezing. Rescue 90 mL 3    ALPRAZolam (XANAX) 0.5 MG tablet Take Once on call to procedure 1 tablet 0    aspirin 325 MG tablet Take 1 tablet (325 mg total) by mouth once daily. 30 tablet 0    budesonide-formoterol 160-4.5 mcg (SYMBICORT) 160-4.5 mcg/actuation HFAA INHALE 2 PUFFS INTO THE LUNGS EVERY 12 (TWELVE) HOURS. 30.6 g 3    calcium citrate-vitamin D3 315-200 mg (CITRACAL+D) 315 mg-5 mcg (200 unit) per tablet Take 1 tablet by mouth 2 (two) times daily.       cycloSPORINE (RESTASIS) 0.05 % ophthalmic emulsion Instill one drop into both eyes two times per day 180 each 3    diazePAM (VALIUM) 10 MG Tab Take 1 tablet (10 mg total) by mouth once as needed. 1 tablet 0    diclofenac sodium (VOLTAREN) 1 % Gel APPLY 2 GRAMS TOPICALLY 4 (FOUR) TIMES DAILY AS NEEDED. 300 g 2    estrogens, conjugated, (PREMARIN) 0.625 MG tablet take 1 tablet by mouth daily 90 tablet 4    halobetasol propion-tazarotene (DUOBRII) 0.01-0.045 % Lotn aaa on feet and hands qhs  then vaseline and warm towel (Patient taking differently: aaa on feet and hands qhs  then vaseline and warm towel) 100 g 3    INV PROPULSID 10 MG Take 2 tablets (20 mg) by mouth 4 (four) times daily. FOR INVESTIGATIONAL USE ONLY. Protocol [id number]  Subject ID: [id number]. 1440 each 0    ketoconazole (NIZORAL) 2 % cream Apply to feet twice daily for 3 weeks (Patient taking differently: Apply to feet twice daily for 3 weeks) 60 g 3    leflunomide (ARAVA) 20 MG Tab Take 1 tablet (20 mg total) by mouth once daily. 90 tablet 0    lifitegrast (XIIDRA) 5 % Dpet INSTILL ONE DROP INTO BOTH EYES TWICE A DAY 60 mL 10    magic mouthwash diphen/antac/lidoc Swish and Spit 5 mL by mouth for 30 seconds twice daily. 150 mL 0     methenamine (HIPREX) 1 gram Tab Take 1 tablet (1 g total) by mouth 2 (two) times daily. 180 tablet 3    methocarbamoL (ROBAXIN) 750 MG Tab Take 1 tablet (750 mg total) by mouth 3 (three) times daily as needed (muscle spasm). 42 tablet 0    mirabegron (MYRBETRIQ) 25 mg Tb24 ER tablet Take 1 tablet (25 mg total) by mouth once daily. 90 tablet 3    montelukast (SINGULAIR) 10 mg tablet TAKE 1 TABLET BY MOUTH EVERY DAY AT NIGHT 90 tablet 3    omeprazole (PRILOSEC) 40 MG capsule Take 1 capsule (40 mg total) by mouth every morning. 30 capsule 6    omeprazole-sodium bicarbonate (ZEGERID) 40-1.1 mg-gram per capsule TAKE 1 CAPSULE BY MOUTH EVERY DAY IN THE MORNING 90 capsule 3    POTASSIUM ORAL Take by mouth once daily.      predniSONE (DELTASONE) 5 MG tablet Take 1 tablet (5 mg total) by mouth once daily. 90 tablet 1    pregabalin (LYRICA) 50 MG capsule Take 3 capsules (150 mg total) by mouth every evening. (Patient taking differently: Take 100 mg by mouth every evening.) 270 capsule 1    PREMARIN vaginal cream PLACE 0.5 GRAM VAGINALLY 3 (THREE) TIMES A WEEK. 30 g 1    progesterone (PROMETRIUM) 100 MG capsule Take 1 capsule by mouth daily. 90 capsule 1    progesterone (PROMETRIUM) 100 MG capsule take 1 capsule by mouth daily 90 capsule 4    promethazine (PHENERGAN) 25 MG tablet Take 1 tablet (25 mg total) by mouth every 6 (six) hours as needed for Nausea. 30 tablet 1    promethazine (PHENERGAN) 25 MG tablet Take 1 tablet (25 mg total) by mouth every 6 (six) hours as needed for Nausea. 15 tablet 0    rosuvastatin (CRESTOR) 20 MG tablet Take 1 tablet (20 mg total) by mouth every evening. 90 tablet 3    sarilumab (KEVZARA) 200 mg/1.14 mL Syrg Inject 1.14 mL (200 mg total) into the skin every 14 (fourteen) days. 2.28 mL 2    sucralfate (CARAFATE) 1 gram tablet TAKE 1 TABLET (1 G TOTAL) BY MOUTH 4 (FOUR) TIMES DAILY. 360 tablet 2    sucralfate (CARAFATE) 1 gram tablet Take 1 tablet (1 g total) by mouth 4 (four) times daily. 360  tablet 3     No current facility-administered medications on file prior to visit.     Social History     Socioeconomic History    Marital status:    Tobacco Use    Smoking status: Never    Smokeless tobacco: Never   Substance and Sexual Activity    Alcohol use: Yes     Comment: 2-3 times per year.    Drug use: No    Sexual activity: Yes     Partners: Male     Birth control/protection: Surgical     Social Determinants of Health     Financial Resource Strain: Low Risk     Difficulty of Paying Living Expenses: Not hard at all   Food Insecurity: Unknown    Worried About Running Out of Food in the Last Year: Patient refused    Ran Out of Food in the Last Year: Patient refused   Transportation Needs: Unknown    Lack of Transportation (Medical): Patient refused    Lack of Transportation (Non-Medical): Patient refused   Physical Activity: Unknown    Days of Exercise per Week: 2 days   Stress: No Stress Concern Present    Feeling of Stress : Not at all   Social Connections: Unknown    Frequency of Communication with Friends and Family: Twice a week    Frequency of Social Gatherings with Friends and Family: Twice a week    Active Member of Clubs or Organizations: Yes    Attends Club or Organization Meetings: Patient refused    Marital Status:    Housing Stability: Unknown    Unable to Pay for Housing in the Last Year: No    Unstable Housing in the Last Year: No     Review of Systems   Constitutional:  Negative for fever, malaise/fatigue and weight loss.   HENT:  Negative for congestion and hearing loss.    Eyes:  Negative for blurred vision and redness.   Respiratory:  Negative for cough and shortness of breath.    Cardiovascular:  Negative for chest pain, palpitations and leg swelling.   Gastrointestinal:  Negative for abdominal pain, diarrhea, heartburn, nausea and vomiting.   Genitourinary:  Negative for dysuria, flank pain and hematuria.   Musculoskeletal:  Positive for back pain. Negative for joint pain,  myalgias and neck pain.        Foot pain improved    Skin:  Negative for rash.   Neurological:  Negative for dizziness, tingling, tremors, sensory change, focal weakness, seizures, loss of consciousness, weakness and headaches.   Endo/Heme/Allergies:  Does not bruise/bleed easily.   Psychiatric/Behavioral:  Negative for depression, hallucinations, substance abuse and suicidal ideas. The patient is not nervous/anxious and does not have insomnia.      Vitals:    03/08/23 1448   BP: 108/68   Pulse: 98   Temp: 98.7 °F (37.1 °C)     Physical Exam  HENT:      Head: Normocephalic and atraumatic.   Eyes:      Conjunctiva/sclera: Conjunctivae normal.      Pupils: Pupils are equal, round, and reactive to light.   Cardiovascular:      Rate and Rhythm: Normal rate and regular rhythm.   Pulmonary:      Effort: Pulmonary effort is normal.      Breath sounds: Normal breath sounds.   Abdominal:      General: Bowel sounds are normal.      Palpations: Abdomen is soft.   Musculoskeletal:         General: No swelling, tenderness, deformity or signs of injury. Normal range of motion.      Cervical back: Normal range of motion and neck supple.      Lumbar back: Normal. No swelling, edema, deformity, signs of trauma or lacerations. Normal range of motion.      Right lower leg: No edema.      Left lower leg: No edema.   Skin:     General: Skin is warm and dry.   Neurological:      Mental Status: She is alert and oriented to person, place, and time.      Gait: Gait is intact.   Psychiatric:         Mood and Affect: Mood and affect normal.         Cognition and Memory: Memory normal.         Judgment: Judgment normal.       A/P   - Reports recurrence of back pain. Epidural injection helped considerably. Will plan to repeat for pain relief.     1. Osteoarthritis of spine with radiculopathy, lumbar region  -     Procedure Order to Pain Management; Future; Expected date: 03/08/2023    2. Spinal stenosis of lumbar region, unspecified whether  neurogenic claudication present  -     Procedure Order to Pain Management; Future; Expected date: 03/08/2023      F/u procedure unit.   We will schedule the patient for an interlaminar epidural at L5/S1.  Patient said that worked very well until she had trouble with her left foot postoperatively.  I have personally taken the history and examined this patient and agree with the resident's note as stated above.

## 2023-03-13 ENCOUNTER — TELEPHONE (OUTPATIENT)
Dept: SPORTS MEDICINE | Facility: CLINIC | Age: 63
End: 2023-03-13
Payer: MEDICARE

## 2023-03-13 DIAGNOSIS — M17.11 PRIMARY OSTEOARTHRITIS OF RIGHT KNEE: Primary | ICD-10-CM

## 2023-03-13 NOTE — TELEPHONE ENCOUNTER
Spoke to pt about her injection authorizations still pending. Pre-service is behind in their work, so her referral has not been processed yet. I have been Teamsing Pre-service since early March regarding this, but they have not been able to get to it. I told the pt I will call her Wednesday to give her an update on her status.----- Message from Krystal Souza MA sent at 3/13/2023  9:53 AM CDT -----  Regarding: FW: Pt Adv  Contact: [phone]  Can you call this patient about her upcoming injections.  ----- Message -----  From: Светлана Baig  Sent: 3/13/2023   9:50 AM CDT  To: Aditya Smith Staff  Subject: Pt Adv                                           Pt requesting call back in regards to getting a PA completed  Please call and adv @ [phone]

## 2023-03-14 ENCOUNTER — PATIENT MESSAGE (OUTPATIENT)
Dept: RHEUMATOLOGY | Facility: CLINIC | Age: 63
End: 2023-03-14
Payer: MEDICARE

## 2023-03-14 ENCOUNTER — PATIENT MESSAGE (OUTPATIENT)
Dept: ORTHOPEDICS | Facility: CLINIC | Age: 63
End: 2023-03-14
Payer: MEDICARE

## 2023-03-14 ENCOUNTER — PATIENT MESSAGE (OUTPATIENT)
Dept: UROLOGY | Facility: CLINIC | Age: 63
End: 2023-03-14
Payer: MEDICARE

## 2023-03-14 ENCOUNTER — PATIENT MESSAGE (OUTPATIENT)
Dept: PAIN MEDICINE | Facility: OTHER | Age: 63
End: 2023-03-14
Payer: MEDICARE

## 2023-03-14 RX ORDER — FLUCONAZOLE 100 MG/1
TABLET ORAL
Qty: 14 TABLET | Refills: 0 | Status: SHIPPED | OUTPATIENT
Start: 2023-03-14 | End: 2023-04-10 | Stop reason: SDUPTHER

## 2023-03-14 RX ORDER — METHOCARBAMOL 750 MG/1
750 TABLET, FILM COATED ORAL 4 TIMES DAILY PRN
Qty: 40 TABLET | Refills: 0 | Status: SHIPPED | OUTPATIENT
Start: 2023-03-14 | End: 2023-04-10 | Stop reason: SDUPTHER

## 2023-03-15 ENCOUNTER — PATIENT MESSAGE (OUTPATIENT)
Dept: RHEUMATOLOGY | Facility: CLINIC | Age: 63
End: 2023-03-15
Payer: MEDICARE

## 2023-03-15 ENCOUNTER — SPECIALTY PHARMACY (OUTPATIENT)
Dept: PHARMACY | Facility: CLINIC | Age: 63
End: 2023-03-15
Payer: MEDICARE

## 2023-03-15 DIAGNOSIS — M05.79 RHEUMATOID ARTHRITIS INVOLVING MULTIPLE SITES WITH POSITIVE RHEUMATOID FACTOR: Primary | ICD-10-CM

## 2023-03-15 RX ORDER — NAPROXEN 500 MG/1
500 TABLET ORAL 2 TIMES DAILY PRN
Qty: 180 TABLET | Refills: 1 | Status: SHIPPED | OUTPATIENT
Start: 2023-03-15 | End: 2023-08-31

## 2023-03-15 NOTE — TELEPHONE ENCOUNTER
Specialty Pharmacy - Clinical Reassessment    Specialty Medication Orders Linked to Encounter      Flowsheet Row Most Recent Value   Medication #1 sarilumab (KEVZARA) 200 mg/1.14 mL Syrg (Order#[id number], Rx#[id number])          Patient Diagnosis   M05.79 - Rheumatoid arthritis involving multiple sites with positive rheumatoid factor    Joyce Peterson is a 62 y.o. female, who is followed by the specialty pharmacy service for management and education of her RA.  She has been on therapy with Kevzara since 2/25/2019.  I have reviewed her electronic medical record and current medication list and determined that specialty medication adjustment Is not needed at this time.    Patient has not experienced adverse events.  She Is adherent reporting 2 missed doses from holding off appropriately due to being sick.  Adherence has been encouraged with the following mechanism(s): refill reminder calls and missed dose guidance from dose hold when sick.  She is meeting goals of therapy and will continue treatment.        2/28/2023 1/30/2023 1/5/2023 12/7/2022 11/9/2022 10/10/2022 9/7/2022   Follow Up Review   # of missed doses 0 0 0 0 0 1 0   Reason      [place] ill or sick    New Medications? No No No No Yes Yes No   New Conditions? No No No No No No No   New Allergies? No No No No No No No   Med Effective? Good Good Good Good Good Good Very good   Urgent Care? No No  No Yes No No   Requested Pharmacist? No No No No Yes No No            Therapy is appropriate to continue.    Therapy is effective: Yes  On scale of 1 to 10, how does patient rank quality of life? (10 - Best): 7  Recommendations: none at this time.  Review Method: Patient Contact    Tasks added this encounter   12/15/2023 - Clinical - Follow Up Assesement (Annual)   Tasks due within next 3 months   3/27/2023 - Refill Call (Auto Added)     Amberly Dominguez, PharmD  [facility]  [address] [address]  Phone: [phone]  Fax:  [phone]

## 2023-03-15 NOTE — TELEPHONE ENCOUNTER
Per FA , pt does not qualify for Kevzara PAP. Pt notified and states she will proceed with Kevzara for RA since she's established safety and efficacy on medication.

## 2023-03-16 ENCOUNTER — TELEPHONE (OUTPATIENT)
Dept: PAIN MEDICINE | Facility: OTHER | Age: 63
End: 2023-03-16
Payer: MEDICARE

## 2023-03-16 ENCOUNTER — PATIENT MESSAGE (OUTPATIENT)
Dept: PAIN MEDICINE | Facility: OTHER | Age: 63
End: 2023-03-16
Payer: MEDICARE

## 2023-03-16 ENCOUNTER — OFFICE VISIT (OUTPATIENT)
Dept: SPORTS MEDICINE | Facility: CLINIC | Age: 63
End: 2023-03-16
Payer: MEDICARE

## 2023-03-16 ENCOUNTER — PATIENT MESSAGE (OUTPATIENT)
Dept: ORTHOPEDICS | Facility: CLINIC | Age: 63
End: 2023-03-16
Payer: MEDICARE

## 2023-03-16 VITALS
BODY MASS INDEX: 25.68 KG/M2 | HEART RATE: 108 BPM | SYSTOLIC BLOOD PRESSURE: 129 MMHG | HEIGHT: 61 IN | DIASTOLIC BLOOD PRESSURE: 75 MMHG | WEIGHT: 136 LBS

## 2023-03-16 DIAGNOSIS — M47.26 OSTEOARTHRITIS OF SPINE WITH RADICULOPATHY, LUMBAR REGION: ICD-10-CM

## 2023-03-16 DIAGNOSIS — M17.11 PRIMARY OSTEOARTHRITIS OF RIGHT KNEE: Primary | ICD-10-CM

## 2023-03-16 DIAGNOSIS — M21.42 PES PLANOVALGUS, ACQUIRED, LEFT: Primary | ICD-10-CM

## 2023-03-16 DIAGNOSIS — G89.29 CHRONIC PAIN OF RIGHT KNEE: ICD-10-CM

## 2023-03-16 DIAGNOSIS — G89.4 CHRONIC PAIN SYNDROME: ICD-10-CM

## 2023-03-16 DIAGNOSIS — M54.16 LUMBAR RADICULOPATHY: Primary | ICD-10-CM

## 2023-03-16 DIAGNOSIS — M25.561 CHRONIC PAIN OF RIGHT KNEE: ICD-10-CM

## 2023-03-16 PROCEDURE — 99499 NO LOS: ICD-10-PCS | Mod: S$GLB,,, | Performed by: PHYSICIAN ASSISTANT

## 2023-03-16 PROCEDURE — 1160F RVW MEDS BY RX/DR IN RCRD: CPT | Mod: CPTII,S$GLB,, | Performed by: PHYSICIAN ASSISTANT

## 2023-03-16 PROCEDURE — 3078F DIAST BP <80 MM HG: CPT | Mod: CPTII,S$GLB,, | Performed by: PHYSICIAN ASSISTANT

## 2023-03-16 PROCEDURE — 3008F PR BODY MASS INDEX (BMI) DOCUMENTED: ICD-10-PCS | Mod: CPTII,S$GLB,, | Performed by: PHYSICIAN ASSISTANT

## 2023-03-16 PROCEDURE — 1159F MED LIST DOCD IN RCRD: CPT | Mod: CPTII,S$GLB,, | Performed by: PHYSICIAN ASSISTANT

## 2023-03-16 PROCEDURE — 99999 PR PBB SHADOW E&M-EST. PATIENT-LVL V: ICD-10-PCS | Mod: PBBFAC,,, | Performed by: PHYSICIAN ASSISTANT

## 2023-03-16 PROCEDURE — 3074F PR MOST RECENT SYSTOLIC BLOOD PRESSURE < 130 MM HG: ICD-10-PCS | Mod: CPTII,S$GLB,, | Performed by: PHYSICIAN ASSISTANT

## 2023-03-16 PROCEDURE — 1159F PR MEDICATION LIST DOCUMENTED IN MEDICAL RECORD: ICD-10-PCS | Mod: CPTII,S$GLB,, | Performed by: PHYSICIAN ASSISTANT

## 2023-03-16 PROCEDURE — 20610 PR DRAIN/INJECT LARGE JOINT/BURSA: ICD-10-PCS | Mod: 79,RT,S$GLB, | Performed by: PHYSICIAN ASSISTANT

## 2023-03-16 PROCEDURE — 3008F BODY MASS INDEX DOCD: CPT | Mod: CPTII,S$GLB,, | Performed by: PHYSICIAN ASSISTANT

## 2023-03-16 PROCEDURE — 99499 UNLISTED E&M SERVICE: CPT | Mod: S$GLB,,, | Performed by: PHYSICIAN ASSISTANT

## 2023-03-16 PROCEDURE — 3074F SYST BP LT 130 MM HG: CPT | Mod: CPTII,S$GLB,, | Performed by: PHYSICIAN ASSISTANT

## 2023-03-16 PROCEDURE — 99999 PR PBB SHADOW E&M-EST. PATIENT-LVL V: CPT | Mod: PBBFAC,,, | Performed by: PHYSICIAN ASSISTANT

## 2023-03-16 PROCEDURE — 3078F PR MOST RECENT DIASTOLIC BLOOD PRESSURE < 80 MM HG: ICD-10-PCS | Mod: CPTII,S$GLB,, | Performed by: PHYSICIAN ASSISTANT

## 2023-03-16 PROCEDURE — 1160F PR REVIEW ALL MEDS BY PRESCRIBER/CLIN PHARMACIST DOCUMENTED: ICD-10-PCS | Mod: CPTII,S$GLB,, | Performed by: PHYSICIAN ASSISTANT

## 2023-03-16 PROCEDURE — 20610 DRAIN/INJ JOINT/BURSA W/O US: CPT | Mod: 79,RT,S$GLB, | Performed by: PHYSICIAN ASSISTANT

## 2023-03-16 RX ORDER — TRAMADOL HYDROCHLORIDE 50 MG/1
50 TABLET ORAL EVERY 12 HOURS PRN
Qty: 60 TABLET | Refills: 0 | Status: SHIPPED | OUTPATIENT
Start: 2023-03-16 | End: 2023-03-17 | Stop reason: CLARIF

## 2023-03-16 NOTE — TELEPHONE ENCOUNTER
----- Message from Kala Mcclendon sent at 3/16/2023  9:39 AM CDT -----  Regarding: Refill Request  Type:  RX Refill Request    Who Called: TONNY GOMEZ [[id number]]    Refill or New Rx: Refill     RX Name and Strength:  traMADoL (ULTRAM) 50 mg tablet    How is the patient currently taking it? (ex. 1XDay)    Is this a 30 day or 90 day RX:    Preferred Pharmacy with phone number: [id number] - [place], [place] - [address]    Local or Mail Order: local    Ordering Provider: Tran Soria Call Back Number: [phone]    Additional Information:

## 2023-03-16 NOTE — TELEPHONE ENCOUNTER
----- Message from Linda Peters sent at 3/16/2023  9:34 AM CDT -----  Regarding: Reschedule  Name of Who is Calling: TONNY GOMEZ [[id number]]           What is the request in detail: Patient is requesting a call back to reschedule upcoming procedure.  Patient has to attend a .  Please assist.           Can the clinic reply by [facility]: No           [facility] [phone]

## 2023-03-16 NOTE — PROGRESS NOTES
Patient is here for follow up of knee arthritis. Pt is requesting synvisc injection #1.  PMFH reviewed per encounter record. Has failed other conservative modalities including NSAIDS, activity modification, weight loss.    The prior shot was tolerated well.    PHYSICAL EXAMINATION:     General: The patient is alert and oriented x 3. Mood is pleasant.   Observation of ears, eyes and nose reveals no gross abnormalities. No   labored breathing observed.     No signs of infection or adverse reaction to knee.    PROCEDURE NOTE:  Aspiration/Injection Procedure    A time out was performed, including verification of patient ID, procedure, site and side, availability of information and equipment, review of safety issues, and agreement with consent, the procedure site was marked.    Aspiration:  After time out was performed, the patient was prepped aseptically with povidone-iodine swabsticks. 10cc's of normal joint fluid was aspirated from the Superolateral  aspect of the right Knee Joint using a 22g x 1.5 needle in sterile fashion without complication.     Injection:  Following aspiration, a diagnostic and therapeutic injection of 2cc Synvisc (1st) was given under sterile technique in the supine position.     Joyce RAMU Peterson had no adverse reactions to the medication. Pain decreased. She was instructed to apply ice to the joint for 20 minutes and avoid strenuous activities for 24-36 hours following the injection. She was warned of possible blood sugar and/or blood pressure changes during that time. Following that time, she can resume regular activities.     RTC 1 week for 2nd injection.  All questions were answered, pt will contact us for questions or concerns in the interim.

## 2023-03-16 NOTE — TELEPHONE ENCOUNTER
Patient requesting refill on tramdol 50 mg   Last office visit 3/8/23   shows last refill on 12/20/22  Patient does not have a pain contract on file with [facility] department  Patient last UDS none on file was not consistent with current therapy

## 2023-03-17 ENCOUNTER — TELEPHONE (OUTPATIENT)
Dept: PAIN MEDICINE | Facility: CLINIC | Age: 63
End: 2023-03-17
Payer: MEDICARE

## 2023-03-17 DIAGNOSIS — G89.4 CHRONIC PAIN SYNDROME: ICD-10-CM

## 2023-03-17 DIAGNOSIS — M47.26 OSTEOARTHRITIS OF SPINE WITH RADICULOPATHY, LUMBAR REGION: ICD-10-CM

## 2023-03-17 DIAGNOSIS — M54.16 LUMBAR RADICULOPATHY: ICD-10-CM

## 2023-03-20 ENCOUNTER — TELEPHONE (OUTPATIENT)
Dept: ORTHOPEDICS | Facility: CLINIC | Age: 63
End: 2023-03-20
Payer: MEDICARE

## 2023-03-20 ENCOUNTER — TELEPHONE (OUTPATIENT)
Dept: PHARMACY | Facility: CLINIC | Age: 63
End: 2023-03-20
Payer: MEDICARE

## 2023-03-20 DIAGNOSIS — M54.16 LUMBAR RADICULOPATHY: Primary | ICD-10-CM

## 2023-03-20 DIAGNOSIS — G89.4 CHRONIC PAIN SYNDROME: ICD-10-CM

## 2023-03-20 DIAGNOSIS — M21.42 PES PLANOVALGUS, ACQUIRED, LEFT: Primary | ICD-10-CM

## 2023-03-20 DIAGNOSIS — M47.26 OSTEOARTHRITIS OF SPINE WITH RADICULOPATHY, LUMBAR REGION: ICD-10-CM

## 2023-03-20 RX ORDER — TRAMADOL HYDROCHLORIDE 50 MG/1
50 TABLET ORAL EVERY 12 HOURS PRN
Qty: 60 TABLET | Refills: 0 | Status: SHIPPED | OUTPATIENT
Start: 2023-03-20 | End: 2023-05-02 | Stop reason: SDUPTHER

## 2023-03-20 NOTE — TELEPHONE ENCOUNTER
Spoke to pt and her Nerve pain is completely gone, therapy is helping a lot,  is doing another back injection for her and she wanted me to inform  her right knee has been swollen so she started the gel injection series.Ct was scheduled prior to appt. Pt verbalized understanding.----- Message from Cheryl Caldwell MA sent at 3/17/2023  3:07 PM CDT -----    ----- Message -----  From: Ariella Finnegan MD  Sent: 3/17/2023  12:00 AM CDT  To: Sivan Krishnan Staff    Check in on her nerve pain - 1.) better worse same 2.) what did Dr. Mayfield say 3.) is therapy helping.    Also needs CT scan foot scheduled for her 3 month postop with me - evaluate healing.

## 2023-03-21 ENCOUNTER — TELEPHONE (OUTPATIENT)
Dept: GASTROENTEROLOGY | Facility: CLINIC | Age: 63
End: 2023-03-21
Payer: MEDICARE

## 2023-03-21 ENCOUNTER — DOCUMENTATION ONLY (OUTPATIENT)
Dept: PHARMACY | Facility: CLINIC | Age: 63
End: 2023-03-21
Payer: MEDICARE

## 2023-03-21 NOTE — TELEPHONE ENCOUNTER
----- Message from Shanice Moore sent at 3/21/2023 10:22 AM CDT -----  Regarding: PA APPROVED  HI,    PA APPROVED ZEGRID 40-1.1GM UNTIL 12/31/23    Thanks,  Ramón Moore [facility].

## 2023-03-22 DIAGNOSIS — M21.42 PES PLANOVALGUS, ACQUIRED, LEFT: Primary | ICD-10-CM

## 2023-03-23 ENCOUNTER — OFFICE VISIT (OUTPATIENT)
Dept: SPORTS MEDICINE | Facility: CLINIC | Age: 63
End: 2023-03-23
Payer: MEDICARE

## 2023-03-23 VITALS — WEIGHT: 136 LBS | HEIGHT: 61 IN | BODY MASS INDEX: 25.68 KG/M2

## 2023-03-23 DIAGNOSIS — M17.11 PRIMARY OSTEOARTHRITIS OF RIGHT KNEE: Primary | ICD-10-CM

## 2023-03-23 PROCEDURE — 1159F PR MEDICATION LIST DOCUMENTED IN MEDICAL RECORD: ICD-10-PCS | Mod: CPTII,S$GLB,, | Performed by: PHYSICIAN ASSISTANT

## 2023-03-23 PROCEDURE — 1160F PR REVIEW ALL MEDS BY PRESCRIBER/CLIN PHARMACIST DOCUMENTED: ICD-10-PCS | Mod: CPTII,S$GLB,, | Performed by: PHYSICIAN ASSISTANT

## 2023-03-23 PROCEDURE — 20610 PR DRAIN/INJECT LARGE JOINT/BURSA: ICD-10-PCS | Mod: 79,RT,S$GLB, | Performed by: PHYSICIAN ASSISTANT

## 2023-03-23 PROCEDURE — 3008F PR BODY MASS INDEX (BMI) DOCUMENTED: ICD-10-PCS | Mod: CPTII,S$GLB,, | Performed by: PHYSICIAN ASSISTANT

## 2023-03-23 PROCEDURE — 99499 UNLISTED E&M SERVICE: CPT | Mod: S$GLB,,, | Performed by: PHYSICIAN ASSISTANT

## 2023-03-23 PROCEDURE — 99999 PR PBB SHADOW E&M-EST. PATIENT-LVL IV: ICD-10-PCS | Mod: PBBFAC,,, | Performed by: PHYSICIAN ASSISTANT

## 2023-03-23 PROCEDURE — 99499 NO LOS: ICD-10-PCS | Mod: S$GLB,,, | Performed by: PHYSICIAN ASSISTANT

## 2023-03-23 PROCEDURE — 1160F RVW MEDS BY RX/DR IN RCRD: CPT | Mod: CPTII,S$GLB,, | Performed by: PHYSICIAN ASSISTANT

## 2023-03-23 PROCEDURE — 1159F MED LIST DOCD IN RCRD: CPT | Mod: CPTII,S$GLB,, | Performed by: PHYSICIAN ASSISTANT

## 2023-03-23 PROCEDURE — 20610 DRAIN/INJ JOINT/BURSA W/O US: CPT | Mod: 79,RT,S$GLB, | Performed by: PHYSICIAN ASSISTANT

## 2023-03-23 PROCEDURE — 99999 PR PBB SHADOW E&M-EST. PATIENT-LVL IV: CPT | Mod: PBBFAC,,, | Performed by: PHYSICIAN ASSISTANT

## 2023-03-23 PROCEDURE — 3008F BODY MASS INDEX DOCD: CPT | Mod: CPTII,S$GLB,, | Performed by: PHYSICIAN ASSISTANT

## 2023-03-23 NOTE — PROGRESS NOTES
Patient is here for follow up of knee arthritis. Pt is requesting synvisc injection #2.  PMFH reviewed per encounter record. Has failed other conservative modalities including NSAIDS, activity modification, weight loss.    The prior shot was tolerated well.    PHYSICAL EXAMINATION:     General: The patient is alert and oriented x 3. Mood is pleasant.   Observation of ears, eyes and nose reveals no gross abnormalities. No   labored breathing observed.     No signs of infection or adverse reaction to knee.    PROCEDURE NOTE:  Aspiration/Injection Procedure    A time out was performed, including verification of patient ID, procedure, site and side, availability of information and equipment, review of safety issues, and agreement with consent, the procedure site was marked.    Aspiration:  After time out was performed, the patient was prepped aseptically with povidone-iodine swabsticks. 8cc's of normal joint fluid was aspirated from the Superolateral  aspect of the right Knee Joint using a 22g x 1.5 needle in sterile fashion without complication.     Injection:  Following aspiration, a diagnostic and therapeutic injection of 2cc Synvisc (1st) was given under sterile technique in the supine position.     Joyce RAMU Peterson had no adverse reactions to the medication. Pain decreased. She was instructed to apply ice to the joint for 20 minutes and avoid strenuous activities for 24-36 hours following the injection. She was warned of possible blood sugar and/or blood pressure changes during that time. Following that time, she can resume regular activities.     RTC 1 week for 3rd injection.  All questions were answered, pt will contact us for questions or concerns in the interim.

## 2023-03-30 ENCOUNTER — PATIENT MESSAGE (OUTPATIENT)
Dept: ORTHOPEDICS | Facility: CLINIC | Age: 63
End: 2023-03-30

## 2023-03-30 ENCOUNTER — HOSPITAL ENCOUNTER (OUTPATIENT)
Dept: RADIOLOGY | Facility: HOSPITAL | Age: 63
Discharge: HOME | End: 2023-03-30
Attending: ORTHOPAEDIC SURGERY
Payer: MEDICARE

## 2023-03-30 ENCOUNTER — OFFICE VISIT (OUTPATIENT)
Dept: SPORTS MEDICINE | Facility: CLINIC | Age: 63
End: 2023-03-30
Payer: MEDICARE

## 2023-03-30 ENCOUNTER — OFFICE VISIT (OUTPATIENT)
Dept: ORTHOPEDICS | Facility: CLINIC | Age: 63
End: 2023-03-30
Payer: MEDICARE

## 2023-03-30 VITALS
HEIGHT: 61 IN | DIASTOLIC BLOOD PRESSURE: 85 MMHG | WEIGHT: 136 LBS | BODY MASS INDEX: 25.68 KG/M2 | SYSTOLIC BLOOD PRESSURE: 143 MMHG

## 2023-03-30 DIAGNOSIS — M17.11 PRIMARY OSTEOARTHRITIS OF RIGHT KNEE: Primary | ICD-10-CM

## 2023-03-30 DIAGNOSIS — Z98.890 STATUS POST LEFT FOOT SURGERY: Primary | ICD-10-CM

## 2023-03-30 DIAGNOSIS — M21.42 PES PLANOVALGUS, ACQUIRED, LEFT: ICD-10-CM

## 2023-03-30 PROCEDURE — 99024 POSTOP FOLLOW-UP VISIT: CPT | Mod: S$GLB,,, | Performed by: ORTHOPAEDIC SURGERY

## 2023-03-30 PROCEDURE — 73630 X-RAY EXAM OF FOOT: CPT | Mod: 26,LT,, | Performed by: RADIOLOGY

## 2023-03-30 PROCEDURE — 3077F PR MOST RECENT SYSTOLIC BLOOD PRESSURE >= 140 MM HG: ICD-10-PCS | Mod: CPTII,S$GLB,, | Performed by: PHYSICIAN ASSISTANT

## 2023-03-30 PROCEDURE — 1159F PR MEDICATION LIST DOCUMENTED IN MEDICAL RECORD: ICD-10-PCS | Mod: CPTII,S$GLB,, | Performed by: PHYSICIAN ASSISTANT

## 2023-03-30 PROCEDURE — 3079F DIAST BP 80-89 MM HG: CPT | Mod: CPTII,S$GLB,, | Performed by: PHYSICIAN ASSISTANT

## 2023-03-30 PROCEDURE — 99999 PR PBB SHADOW E&M-EST. PATIENT-LVL IV: ICD-10-PCS | Mod: PBBFAC,,, | Performed by: PHYSICIAN ASSISTANT

## 2023-03-30 PROCEDURE — 99999 PR PBB SHADOW E&M-EST. PATIENT-LVL III: ICD-10-PCS | Mod: PBBFAC,,, | Performed by: ORTHOPAEDIC SURGERY

## 2023-03-30 PROCEDURE — 20610 PR DRAIN/INJECT LARGE JOINT/BURSA: ICD-10-PCS | Mod: RT,S$GLB,, | Performed by: PHYSICIAN ASSISTANT

## 2023-03-30 PROCEDURE — 73700 CT LOWER EXTREMITY W/O DYE: CPT | Mod: 26,LT,, | Performed by: RADIOLOGY

## 2023-03-30 PROCEDURE — 99999 PR PBB SHADOW E&M-EST. PATIENT-LVL III: CPT | Mod: PBBFAC,,, | Performed by: ORTHOPAEDIC SURGERY

## 2023-03-30 PROCEDURE — 1159F MED LIST DOCD IN RCRD: CPT | Mod: CPTII,S$GLB,, | Performed by: PHYSICIAN ASSISTANT

## 2023-03-30 PROCEDURE — 3079F PR MOST RECENT DIASTOLIC BLOOD PRESSURE 80-89 MM HG: ICD-10-PCS | Mod: CPTII,S$GLB,, | Performed by: PHYSICIAN ASSISTANT

## 2023-03-30 PROCEDURE — 99499 NO LOS: ICD-10-PCS | Mod: S$GLB,,, | Performed by: PHYSICIAN ASSISTANT

## 2023-03-30 PROCEDURE — 3008F PR BODY MASS INDEX (BMI) DOCUMENTED: ICD-10-PCS | Mod: CPTII,S$GLB,, | Performed by: PHYSICIAN ASSISTANT

## 2023-03-30 PROCEDURE — 20610 DRAIN/INJ JOINT/BURSA W/O US: CPT | Mod: RT,S$GLB,, | Performed by: PHYSICIAN ASSISTANT

## 2023-03-30 PROCEDURE — 1160F PR REVIEW ALL MEDS BY PRESCRIBER/CLIN PHARMACIST DOCUMENTED: ICD-10-PCS | Mod: CPTII,S$GLB,, | Performed by: PHYSICIAN ASSISTANT

## 2023-03-30 PROCEDURE — 3077F SYST BP >= 140 MM HG: CPT | Mod: CPTII,S$GLB,, | Performed by: PHYSICIAN ASSISTANT

## 2023-03-30 PROCEDURE — 73700 CT LOWER EXTREMITY W/O DYE: CPT | Mod: TC,LT

## 2023-03-30 PROCEDURE — 73700 CT FOOT WITHOUT CONTRAST LEFT: ICD-10-PCS | Mod: 26,LT,, | Performed by: RADIOLOGY

## 2023-03-30 PROCEDURE — 3008F BODY MASS INDEX DOCD: CPT | Mod: CPTII,S$GLB,, | Performed by: PHYSICIAN ASSISTANT

## 2023-03-30 PROCEDURE — 73630 XR FOOT COMPLETE 3 VIEW LEFT: ICD-10-PCS | Mod: 26,LT,, | Performed by: RADIOLOGY

## 2023-03-30 PROCEDURE — 73630 X-RAY EXAM OF FOOT: CPT | Mod: TC,LT

## 2023-03-30 PROCEDURE — 99499 UNLISTED E&M SERVICE: CPT | Mod: S$GLB,,, | Performed by: PHYSICIAN ASSISTANT

## 2023-03-30 PROCEDURE — 99024 PR POST-OP FOLLOW-UP VISIT: ICD-10-PCS | Mod: S$GLB,,, | Performed by: ORTHOPAEDIC SURGERY

## 2023-03-30 PROCEDURE — 1160F RVW MEDS BY RX/DR IN RCRD: CPT | Mod: CPTII,S$GLB,, | Performed by: PHYSICIAN ASSISTANT

## 2023-03-30 PROCEDURE — 99999 PR PBB SHADOW E&M-EST. PATIENT-LVL IV: CPT | Mod: PBBFAC,,, | Performed by: PHYSICIAN ASSISTANT

## 2023-03-30 RX ORDER — METHOCARBAMOL 500 MG/1
1000 TABLET, FILM COATED ORAL 3 TIMES DAILY PRN
Qty: 180 TABLET | Refills: 2 | Status: SHIPPED | OUTPATIENT
Start: 2023-03-30 | End: 2023-04-10 | Stop reason: DRUGHIGH

## 2023-03-30 NOTE — PROGRESS NOTES
Patient is here for follow up of right knee arthritis. Pt is requesting synvisc injection #3.  PMFH reviewed per encounter record. Has failed other conservative modalities including NSAIDS, activity modification, weight loss.    The prior shot was tolerated well.    PHYSICAL EXAMINATION:     General: The patient is alert and oriented x 3. Mood is pleasant.   Observation of ears, eyes and nose reveals no gross abnormalities. No   labored breathing observed.     No signs of infection or adverse reaction to knee.    PROCEDURE NOTE:  Aspiration/Injection Procedure right knee    A time out was performed, including verification of patient ID, procedure, site and side, availability of information and equipment, review of safety issues, and agreement with consent, the procedure site was marked.    Aspiration:  After time out was performed, the patient was prepped aseptically with povidone-iodine swabsticks. 43cc's of normal joint fluid was aspirated from the Superolateral  aspect of the right Knee Joint using a 22g x 1.5 needle in sterile fashion without complication.     Injection:  Following aspiration, a diagnostic and therapeutic injection of 2cc Synvisc (3rd) was given under sterile technique in the supine position.     Joyce Peterson had no adverse reactions to the medication. Pain decreased. She was instructed to apply ice to the joint for 20 minutes and avoid strenuous activities for 24-36 hours following the injection. She was warned of possible blood sugar and/or blood pressure changes during that time. Following that time, she can resume regular activities.     RTC in 6 months with Steph Tenorio for possible repeat visco supplementation.  All questions were answered, pt will contact us for questions or concerns in the interim.

## 2023-03-30 NOTE — PROGRESS NOTES
Subjective:   Chief complaint: Follow-up left ankle surgery    Surgery history: 1/11/23 left triple arthrodesis, midfoot arthrodesis, ARLEN, CBG    HPI:   Joyce Peterson is a 62 y.o. female who presents today for postop follow-up.  Pain 4/10 - hesitant/anxious about weaning boot.  Nerve pain is improved.    ROS:  Musculoskeletal: per HPI     Objective:   Exam:  There were no vitals filed for this visit.  General: No acute distress, well-appearing  Musculoskeletal: Standing examination demonstrates neutral plantigrade alignment but very guarded     Incision benign.  No scabbing present.  No drainage present.  There is minimal postop swelling.    Fires TA/GSC/PTT/peroneals but limited excusion.  SILT SP/DP/PT with baseline neuropathy    Imaging:  Radiographs were ordered and independently interpreted by me.    Standing 3v foot demonstrates stable postop appearance of charcot midfoot reconstruction without failure or loosening.    CT scan foot demonstrates excellent (>75%) subtalar, TN consolidation and about 50% consolidation of NC joint.  TMT and CC stable appearing but less consolidation seen.  No hardware failure or loosening noted.      Assessment:     1. Status post left foot surgery        3 months postop, provided encouragement and reassurance to start progression of activities- specifically boot weaning.    I also informed patient I would be leaving and any further surgeries would need to be referred to a colleague.     Plan:       Weight bearing: Weight bearing as tolerated left leg  Immobilization: Discontinue boot today and and then wean out of boot/postop shoe into normal supportive shoe as pain and swelling allow  Therapy: Continue PT  Pain meds: Continue robaxin- refill provided  Restrictions: ADAT, use pain and swelling as guide  Follow-up: 6 weeks with 3v left foot standing     No orders of the defined types were placed in this encounter.      Past Medical History:   Diagnosis Date    Acid reflux      Allergy     Anemia     Asthma     Coronary artery disease     CS (cervical spondylosis) 03/08/2013    Degenerative disc disease     Dry eyes     Dry mouth     Gastroparesis     History of methotrexate therapy 01/19/2022    Hyperlipidemia     Lateral meniscus derangement 04/06/2016    Lobular carcinoma in situ     Lumbar spondylosis 03/08/2013    Osteoarthritis     Osteoporosis     Rheumatoid arthritis(714.0)     Rupture of left triceps tendon 10/17/2018    Umbilical hernia 08/13/2015       Past Surgical History:   Procedure Laterality Date    BREAST BIOPSY Left 01/29/2002    core bx    CARPAL TUNNEL RELEASE Right 05/2017    CHOLECYSTECTOMY  2004    COLONOSCOPY      10/11    COLONOSCOPY N/A 6/29/2017    Procedure: COLONOSCOPY;  Surgeon: López Moore MD;  [facility]  Service: Endoscopy;  Laterality: N/A;    EPIDURAL STEROID INJECTION N/A 11/18/2021    Procedure: INJECTION, STEROID, EPIDURAL, L5-S1 IL NEED CONSENT;  Surgeon: Tj Mayfield MD;  Location: [facility] PAIN MGT;  Service: Pain Management;  Laterality: N/A;    EPIDURAL STEROID INJECTION N/A 9/29/2022    Procedure: LUMBAR L3/L4 IL MADELINE CONTRAST;  Surgeon: Tj Mayfield MD;  Location: [facility] PAIN MGT;  Service: Pain Management;  Laterality: N/A;    EPIDURAL STEROID INJECTION N/A 12/12/2022    Procedure: INJECTION, STEROID, EPIDURAL L5/S1 IL CONTRAST;  Surgeon: Tj Mayfield MD;  Location: [facility] PAIN MGT;  Service: Pain Management;  Laterality: N/A;    FOOT ARTHRODESIS Left 1/11/2023    Procedure: FUSION, FOOT;  Surgeon: Ariella Finnegan MD;  Location: [facility] OR;  Service: Orthopedics;  Laterality: Left;  nimbus    HARVESTING OF BONE GRAFT Left 1/11/2023    Procedure: SURGICAL PROCUREMENT, BONE GRAFT;  Surgeon: Ariella Finnegan MD;  Location: [facility] OR;  Service: Orthopedics;  Laterality: Left;    INJECTION OF ANESTHETIC AGENT AROUND NERVE Bilateral 8/23/2021    Procedure: BLOCK, NERVE, MEDIAL BRANCH L3,L4,L5;  Surgeon: Tj Mayfield MD;  Location: [facility]  MGT;  Service: Pain Management;  Laterality: Bilateral;  1 of 2    INJECTION OF ANESTHETIC AGENT AROUND NERVE Bilateral 8/26/2021    Procedure: BLOCK, NERVE, MEDIAL BRANCH L3,L4,L5;  Surgeon: Tj Mayfield MD;  Location: [facility] PAIN MGT;  Service: Pain Management;  Laterality: Bilateral;  2 of 2    INJECTION OF ANESTHETIC AGENT AROUND NERVE Left 7/7/2022    Procedure: BLOCK, NERVE, LEFT OBTURATOR AND FEMORAL;  Surgeon: Tj Mayfield MD;  Location: [facility] PAIN MGT;  Service: Pain Management;  Laterality: Left;    INJECTION OF FACET JOINT Bilateral 3/12/2020    Procedure: FACET JOINT INJECTION (LUMBAR BLOCK) BILATERAL L4-5 AND L5-S1 DIRECT REFERRAL;  Surgeon: Tj Mayfield MD;  Location: [facility] PAIN MGT;  Service: Pain Management;  Laterality: Bilateral;  NEEDS CONSENT    INJECTION OF FACET JOINT Bilateral 3/29/2021    Procedure: INJECTION, FACET JOINT L3/4, L4/5, L5/S1;  Surgeon: Tj Mayfield MD;  Location: [facility] PAIN MGT;  Service: Pain Management;  Laterality: Bilateral;    INJECTION OF JOINT Right 7/30/2020    Procedure: INJECTION, JOINT, RIGHT HIP Interarticular under flouro;  Surgeon: Tj Mayfield MD;  Location: [facility] PAIN MGT;  Service: Pain Management;  Laterality: Right;  INJECTION, JOINT, RIGHT HIP Interarticular under flouro    INJECTION OF JOINT Right 2/21/2022    Procedure: Injection, Joint RIGHT ISCHIAL BURSA;  Surgeon: Tj Mayfield MD;  Location: [facility] PAIN MGT;  Service: Pain Management;  Laterality: Right;    INTRAMEDULLARY RODDING OF FEMUR Left 5/21/2019    Procedure: INSERTION, INTRAMEDULLARY ARIEL, FEMUR;  Surgeon: López Duff MD;  Location: [address];  Service: Orthopedics;  Laterality: Left;    LENGTHENING OF TENDON OF LOWER EXTREMITY Left 1/11/2023    Procedure: LENGTHENING, TENDON, LOWER EXTREMITY;  Surgeon: Ariella Finnegan MD;  Location: [facility];  Service: Orthopedics;  Laterality: Left;    RADIOFREQUENCY ABLATION Left 9/20/2021    Procedure: RADIOFREQUENCY ABLATION left L3,4,5 RFA  1 of 2   CONSENT NEEDED;  Surgeon: Tj Mayfield MD;  Location: [facility] PAIN MGT;  Service: Pain Management;  Laterality: Left;    RADIOFREQUENCY ABLATION Right 10/4/2021    Procedure: RADIOFREQUENCY ABLATION  right L3,4,5 RFA   2 of 2  CONSENT NEEDED;  Surgeon: Tj Mayfield MD;  Location: [facility] PAIN MGT;  Service: Pain Management;  Laterality: Right;    RADIOFREQUENCY ABLATION Left 4/21/2022    Procedure: Radiofrequency Ablation LEFT L3,L4,L5;  Surgeon: Tj Mayfield MD;  Location: [facility] PAIN MGT;  Service: Pain Management;  Laterality: Left;    RADIOFREQUENCY ABLATION Right 5/12/2022    Procedure: Radiofrequency Ablation RIGHT L3,L4,L5;  Surgeon: Tj Mayfield MD;  Location: [facility] PAIN MGT;  Service: Pain Management;  Laterality: Right;    RADIOFREQUENCY ABLATION Left 7/25/2022    Procedure: Radiofrequency Ablation Left Femoral & Obturator;  Surgeon: Tj Mayfield MD;  Location: [facility] PAIN MGT;  Service: Pain Management;  Laterality: Left;    REPAIR OF TRICEPS TENDON Left 10/17/2018    Procedure: REPAIR, TENDON, TRICEPS left elbow;  Surgeon: Staci Yarbrough MD;  [facility];  Service: Orthopedics;  Laterality: Left;  Anesthesia: General and regional. PRONE, k-wire , hand pan 1 and pan 2, CALL ARTHREX/Tamera notified 10-12 LO    TONSILLECTOMY      TRANSFORAMINAL EPIDURAL INJECTION OF STEROID Right 8/31/2020    Procedure: INJECTION, STEROID, EPIDURAL, TRANSFORAMINAL,  APPROACH, L3-L4 and L4-L5 need consent;  Surgeon: Tj Mayfield MD;  Location: [facility] PAIN MGT;  Service: Pain Management;  Laterality: Right;    TRANSFORAMINAL EPIDURAL INJECTION OF STEROID Bilateral 7/23/2021    Procedure: INJECTION, STEROID, EPIDURAL, TRANSFORAMINAL APPROACH L4/5;  Surgeon: Tj Mayfield MD;  Location: [facility] MGT;  Service: Pain Management;  Laterality: Bilateral;    TRIGGER POINT INJECTION N/A 7/23/2021    Procedure: INJECTION, TRIGGER POINT SCAPULAR;  Surgeon: Tj Mayfield MD;  Location: [facility] PAIN MGT;  Service: Pain  Management;  Laterality: N/A;    TUBAL LIGATION  2003    UPPER GASTROINTESTINAL ENDOSCOPY      10/11    uterine ablation  2003       Family History   Problem Relation Age of Onset    Cancer Mother         Lung Cancer    Emphysema Mother     Heart attack Mother     Alcohol abuse Mother     Cancer Father         Lung Cancer    Osteoarthritis Father     Lung cancer Father     Skin cancer Father     Alcohol abuse Father     Heart disease Brother     Heart attack Brother     Alcohol abuse Brother     Cirrhosis Brother     Osteoarthritis Paternal Aunt     Retinal detachment Maternal Aunt     No Known Problems Sister     No Known Problems Maternal Uncle     No Known Problems Paternal Uncle     No Known Problems Maternal Grandmother     No Known Problems Maternal Grandfather     No Known Problems Paternal Grandmother     No Known Problems Paternal Grandfather     Colon cancer Neg Hx     Esophageal cancer Neg Hx     Stomach cancer Neg Hx     Celiac disease Neg Hx     Diabetes Neg Hx     Thyroid disease Neg Hx     Amblyopia Neg Hx     Blindness Neg Hx     Cataracts Neg Hx     Glaucoma Neg Hx     Hypertension Neg Hx     Macular degeneration Neg Hx     Strabismus Neg Hx     Stroke Neg Hx        Social History     Socioeconomic History    Marital status:    Tobacco Use    Smoking status: Never    Smokeless tobacco: Never   Substance and Sexual Activity    Alcohol use: Yes     Comment: 2-3 times per year.    Drug use: No    Sexual activity: Yes     Partners: Male     Birth control/protection: Surgical     Social Determinants of Health     Financial Resource Strain: Low Risk     Difficulty of Paying Living Expenses: Not hard at all   Food Insecurity: Unknown    Worried About Running Out of Food in the Last Year: Patient refused    Ran Out of Food in the Last Year: Patient refused   Transportation Needs: Unknown    Lack of Transportation (Medical): Patient refused    Lack of Transportation (Non-Medical): Patient refused    Physical Activity: Unknown    Days of Exercise per Week: 2 days   Stress: No Stress Concern Present    Feeling of Stress : Not at all   Social Connections: Unknown    Frequency of Communication with Friends and Family: Twice a week    Frequency of Social Gatherings with Friends and Family: Twice a week    Active Member of Clubs or Organizations: Yes    Attends Club or Organization Meetings: Patient refused    Marital Status:    Housing Stability: Unknown    Unable to Pay for Housing in the Last Year: No    Unstable Housing in the Last Year: No

## 2023-03-31 ENCOUNTER — PATIENT MESSAGE (OUTPATIENT)
Dept: SPORTS MEDICINE | Facility: CLINIC | Age: 63
End: 2023-03-31
Payer: MEDICARE

## 2023-04-03 ENCOUNTER — SPECIALTY PHARMACY (OUTPATIENT)
Dept: PHARMACY | Facility: CLINIC | Age: 63
End: 2023-04-03
Payer: MEDICARE

## 2023-04-03 ENCOUNTER — PATIENT MESSAGE (OUTPATIENT)
Dept: ORTHOPEDICS | Facility: CLINIC | Age: 63
End: 2023-04-03
Payer: MEDICARE

## 2023-04-03 NOTE — TELEPHONE ENCOUNTER
Specialty Pharmacy - Refill Coordination    Specialty Medication Orders Linked to Encounter      Flowsheet Row Most Recent Value   Medication #1 sarilumab (KEVZARA) 200 mg/1.14 mL Syrg (Order#[id number], Rx#[id number])            Refill Questions - Documented Responses      Flowsheet Row Most Recent Value   Patient Availability and HIPAA Verification    Does patient want to proceed with activity? Yes   HIPAA/medical authority confirmed? Yes   Relationship to patient of person spoken to? Self   Refill Screening Questions    Changes to allergies? No   Changes to medications? No   New conditions since last clinic visit? No   Unplanned office visit, urgent care, ED, or hospital admission in the last 4 weeks? No   How does patient/caregiver feel medication is working? Very good   Financial problems or insurance changes? No   How many doses of your specialty medications were missed in the last 4 weeks? 0   Would patient like to speak to a pharmacist? No   When does the patient need to receive the medication? 04/13/23   Refill Delivery Questions    How will the patient receive the medication? MEDRx   When does the patient need to receive the medication? 04/13/23   Shipping Address Home   Address in [facility] confirmed and updated if neccessary? Yes   Expected Copay ($) 218.85   Is the patient able to afford the medication copay? Yes   Payment Method CC on file   Days supply of Refill 28   Supplies needed? --  [Inj Kit]   Refill activity completed? Yes   Refill activity plan Refill scheduled   Shipment/Pickup Date: 04/10/23            Current Outpatient Medications   Medication Sig    albuterol (PROVENTIL/VENTOLIN HFA) 90 mcg/actuation inhaler INHALE 2 PUFFS INTO THE LUNGS EVERY 6 (SIX) HOURS AS NEEDED. RESCUE    albuterol-ipratropium (DUO-NEB) 2.5 mg-0.5 mg/3 mL nebulizer solution Take 3 mLs by nebulization every 6 (six) hours as needed for Wheezing. Rescue    ALPRAZolam (XANAX) 0.5 MG tablet Take Once on call to  procedure    aspirin 325 MG tablet Take 1 tablet (325 mg total) by mouth once daily.    budesonide-formoterol 160-4.5 mcg (SYMBICORT) 160-4.5 mcg/actuation HFAA INHALE 2 PUFFS INTO THE LUNGS EVERY 12 (TWELVE) HOURS.    calcium citrate-vitamin D3 315-200 mg (CITRACAL+D) 315 mg-5 mcg (200 unit) per tablet Take 1 tablet by mouth 2 (two) times daily.     cycloSPORINE (RESTASIS) 0.05 % ophthalmic emulsion Instill one drop into both eyes two times per day    diazePAM (VALIUM) 10 MG Tab Take 1 tablet (10 mg total) by mouth once as needed.    diclofenac sodium (VOLTAREN) 1 % Gel APPLY 2 GRAMS TOPICALLY 4 (FOUR) TIMES DAILY AS NEEDED.    estrogens, conjugated, (PREMARIN) 0.625 MG tablet take 1 tablet by mouth daily    fluconazole (DIFLUCAN) 100 MG tablet Take 1 tablet by mouth once daily for 14 days.    halobetasol propion-tazarotene (DUOBRII) 0.01-0.045 % Lotn aaa on feet and hands qhs  then vaseline and warm towel (Patient taking differently: aaa on feet and hands qhs  then vaseline and warm towel)    INV PROPULSID 10 MG Take 2 tablets (20 mg) by mouth 4 (four) times daily. FOR INVESTIGATIONAL USE ONLY. Protocol [id number]  Subject ID: [id number].    ketoconazole (NIZORAL) 2 % cream Apply to feet twice daily for 3 weeks (Patient taking differently: Apply to feet twice daily for 3 weeks)    leflunomide (ARAVA) 20 MG Tab Take 1 tablet (20 mg total) by mouth once daily.    lifitegrast (XIIDRA) 5 % Dpet INSTILL ONE DROP INTO BOTH EYES TWICE A DAY    magic mouthwash diphen/antac/lidoc Swish and Spit 5 mL by mouth for 30 seconds twice daily.    methenamine (HIPREX) 1 gram Tab Take 1 tablet (1 g total) by mouth 2 (two) times daily.    methocarbamoL (ROBAXIN) 500 MG Tab Take 2 tablets (1,000 mg total) by mouth 3 (three) times daily as needed (muscle pain).    mirabegron (MYRBETRIQ) 25 mg Tb24 ER tablet Take 1 tablet (25 mg total) by mouth once daily.    montelukast (SINGULAIR) 10 mg tablet TAKE 1 TABLET BY MOUTH EVERY DAY AT NIGHT     naproxen (NAPROSYN) 500 MG tablet Take 1 tablet (500 mg total) by mouth 2 (two) times daily as needed (prn).    omeprazole (PRILOSEC) 40 MG capsule Take 1 capsule (40 mg total) by mouth every morning.    omeprazole-sodium bicarbonate (ZEGERID) 40-1.1 mg-gram per capsule TAKE 1 CAPSULE BY MOUTH EVERY DAY IN THE MORNING    POTASSIUM ORAL Take by mouth once daily.    predniSONE (DELTASONE) 1 MG tablet TAKE 5 TABLETS (5 MG TOTAL) BY MOUTH ONCE DAILY.    predniSONE (DELTASONE) 5 MG tablet Take 1 tablet (5 mg total) by mouth once daily.    pregabalin (LYRICA) 50 MG capsule Take 3 capsules (150 mg total) by mouth every evening. (Patient taking differently: Take 100 mg by mouth every evening.)    PREMARIN vaginal cream PLACE 0.5 GRAM VAGINALLY 3 (THREE) TIMES A WEEK.    progesterone (PROMETRIUM) 100 MG capsule Take 1 capsule by mouth daily.    progesterone (PROMETRIUM) 100 MG capsule take 1 capsule by mouth daily    promethazine (PHENERGAN) 25 MG tablet Take 1 tablet (25 mg total) by mouth every 6 (six) hours as needed for Nausea.    promethazine (PHENERGAN) 25 MG tablet Take 1 tablet (25 mg total) by mouth every 6 (six) hours as needed for Nausea.    rosuvastatin (CRESTOR) 20 MG tablet Take 1 tablet (20 mg total) by mouth every evening.    sarilumab (KEVZARA) 200 mg/1.14 mL Syrg Inject 1.14 mL (200 mg total) into the skin every 14 (fourteen) days.    sucralfate (CARAFATE) 1 gram tablet TAKE 1 TABLET (1 G TOTAL) BY MOUTH 4 (FOUR) TIMES DAILY.    sucralfate (CARAFATE) 1 gram tablet Take 1 tablet (1 g total) by mouth 4 (four) times daily.    traMADoL (ULTRAM) 50 mg tablet Take 1 tablet (50 mg total) by mouth every 12 (twelve) hours as needed for Pain.   Last reviewed on 3/30/2023  2:02 PM by Huma Painter PA-C    Review of patient's allergies indicates:   Allergen Reactions    Actemra [tocilizumab] Other (See Comments)     Severe dizziness    Codeine Nausea And Vomiting and Hives    Gold au 198 Hives and Rash     "Hydroxychloroquine Other (See Comments)     Can't remember the reaction      Iodinated contrast media Other (See Comments)     Other reaction(s): BURNING ALL OVER    Iodine Other (See Comments) and Hives     Other reaction(s): BURNING ALL OVER - Iodine dye - Not topical    Ondansetron Nausea And Vomiting    Sulfa (sulfonamide antibiotics) Other (See Comments)     Can't remember the reaction    Zofran [ondansetron hcl (pf)] Nausea And Vomiting     Pt reports that last time she received zofran she started vomiting again    Methotrexate analogues Nausea Only    Pneumococcal vaccine Other (See Comments)    Pneumovax 23 [pneumococcal 23-argenis ps vaccine] Other (See Comments)     "sick"    Methotrexate Nausea Only    Last reviewed on  3/30/2023 2:02 PM by Huma Painter      Tasks added this encounter   No tasks added.   Tasks due within next 3 months   3/27/2023 - Refill Call (Auto Added)     Yvon James, PharmD  [facility]  [address] [place], [place] [address]  [facility] [address]  Phone: [phone]  Fax: [phone]        "

## 2023-04-05 ENCOUNTER — PATIENT MESSAGE (OUTPATIENT)
Dept: SPORTS MEDICINE | Facility: CLINIC | Age: 63
End: 2023-04-05
Payer: MEDICARE

## 2023-04-06 ENCOUNTER — HOSPITAL ENCOUNTER (OUTPATIENT)
Facility: OTHER | Age: 63
Discharge: HOME | End: 2023-04-06
Attending: ANESTHESIOLOGY | Admitting: ANESTHESIOLOGY
Payer: MEDICARE

## 2023-04-06 VITALS
HEIGHT: 61 IN | DIASTOLIC BLOOD PRESSURE: 62 MMHG | RESPIRATION RATE: 14 BRPM | TEMPERATURE: 98 F | OXYGEN SATURATION: 95 % | WEIGHT: 135 LBS | BODY MASS INDEX: 25.49 KG/M2 | SYSTOLIC BLOOD PRESSURE: 119 MMHG | HEART RATE: 72 BPM

## 2023-04-06 DIAGNOSIS — M54.16 LUMBAR RADICULOPATHY: Primary | ICD-10-CM

## 2023-04-06 DIAGNOSIS — G89.29 CHRONIC PAIN: ICD-10-CM

## 2023-04-06 PROCEDURE — 25000003 PHARM REV CODE 250: Performed by: STUDENT IN AN ORGANIZED HEALTH CARE EDUCATION/TRAINING PROGRAM

## 2023-04-06 PROCEDURE — 62323 PR INJ LUMBAR/SACRAL, W/IMAGING GUIDANCE: ICD-10-PCS | Mod: ,,, | Performed by: ANESTHESIOLOGY

## 2023-04-06 PROCEDURE — 63600175 PHARM REV CODE 636 W HCPCS: Performed by: ANESTHESIOLOGY

## 2023-04-06 PROCEDURE — 62323 NJX INTERLAMINAR LMBR/SAC: CPT | Mod: ,,, | Performed by: ANESTHESIOLOGY

## 2023-04-06 PROCEDURE — 62323 NJX INTERLAMINAR LMBR/SAC: CPT | Performed by: ANESTHESIOLOGY

## 2023-04-06 PROCEDURE — 25000003 PHARM REV CODE 250: Performed by: ANESTHESIOLOGY

## 2023-04-06 RX ORDER — DIPHENHYDRAMINE HYDROCHLORIDE 50 MG/ML
25 INJECTION INTRAMUSCULAR; INTRAVENOUS ONCE
Status: COMPLETED | OUTPATIENT
Start: 2023-04-06 | End: 2023-04-06

## 2023-04-06 RX ORDER — SODIUM CHLORIDE 9 MG/ML
500 INJECTION, SOLUTION INTRAVENOUS CONTINUOUS
Status: DISCONTINUED | OUTPATIENT
Start: 2023-04-06 | End: 2023-04-06 | Stop reason: HOSPADM

## 2023-04-06 RX ORDER — LIDOCAINE HYDROCHLORIDE 10 MG/ML
INJECTION, SOLUTION EPIDURAL; INFILTRATION; INTRACAUDAL; PERINEURAL
Status: DISCONTINUED | OUTPATIENT
Start: 2023-04-06 | End: 2023-04-06 | Stop reason: HOSPADM

## 2023-04-06 RX ORDER — FENTANYL CITRATE 50 UG/ML
INJECTION, SOLUTION INTRAMUSCULAR; INTRAVENOUS
Status: DISCONTINUED | OUTPATIENT
Start: 2023-04-06 | End: 2023-04-06 | Stop reason: HOSPADM

## 2023-04-06 RX ORDER — LIDOCAINE HYDROCHLORIDE 20 MG/ML
INJECTION, SOLUTION INFILTRATION; PERINEURAL
Status: DISCONTINUED | OUTPATIENT
Start: 2023-04-06 | End: 2023-04-06 | Stop reason: HOSPADM

## 2023-04-06 RX ORDER — MIDAZOLAM HYDROCHLORIDE 1 MG/ML
INJECTION INTRAMUSCULAR; INTRAVENOUS
Status: DISCONTINUED | OUTPATIENT
Start: 2023-04-06 | End: 2023-04-06 | Stop reason: HOSPADM

## 2023-04-06 RX ORDER — DEXAMETHASONE SODIUM PHOSPHATE 10 MG/ML
INJECTION INTRAMUSCULAR; INTRAVENOUS
Status: DISCONTINUED | OUTPATIENT
Start: 2023-04-06 | End: 2023-04-06 | Stop reason: HOSPADM

## 2023-04-06 RX ADMIN — DIPHENHYDRAMINE HYDROCHLORIDE 25 MG: 50 INJECTION, SOLUTION INTRAMUSCULAR; INTRAVENOUS at 07:04

## 2023-04-06 NOTE — DISCHARGE SUMMARY
Discharge Note  Short Stay      SUMMARY     Admit Date: 4/6/2023    Attending Physician: Tj Mayfield      Discharge Physician: Tj Mayfield      Discharge Date: 4/6/2023 8:22 AM    Procedure(s) (LRB):  INJECTION, STEROID, EPIDURAL L5/S1 (N/A)    Final Diagnosis: Spinal stenosis of lumbar region, unspecified whether neurogenic claudication present [M48.061]  Osteoarthritis of spine with radiculopathy, lumbar region [M47.26]    Disposition: Home or self care    Patient Instructions:   Current Discharge Medication List        CONTINUE these medications which have NOT CHANGED    Details   albuterol (PROVENTIL/VENTOLIN HFA) 90 mcg/actuation inhaler INHALE 2 PUFFS INTO THE LUNGS EVERY 6 (SIX) HOURS AS NEEDED. RESCUE  Qty: 20.1 g, Refills: 11      albuterol-ipratropium (DUO-NEB) 2.5 mg-0.5 mg/3 mL nebulizer solution Take 3 mLs by nebulization every 6 (six) hours as needed for Wheezing. Rescue  Qty: 90 mL, Refills: 3    Associated Diagnoses: Moderate persistent asthma with acute exacerbation; Bronchitis      ALPRAZolam (XANAX) 0.5 MG tablet Take Once on call to procedure  Qty: 1 tablet, Refills: 0    Associated Diagnoses: Left hip pain      aspirin 325 MG tablet Take 1 tablet (325 mg total) by mouth once daily.  Qty: 30 tablet, Refills: 0    Comments: Bedside delivery to [facility] for discharge on 1/12/23      budesonide-formoterol 160-4.5 mcg (SYMBICORT) 160-4.5 mcg/actuation HFAA INHALE 2 PUFFS INTO THE LUNGS EVERY 12 (TWELVE) HOURS.  Qty: 30.6 g, Refills: 3    Associated Diagnoses: Chronic asthma, mild intermittent, uncomplicated      calcium citrate-vitamin D3 315-200 mg (CITRACAL+D) 315 mg-5 mcg (200 unit) per tablet Take 1 tablet by mouth 2 (two) times daily.       cycloSPORINE (RESTASIS) 0.05 % ophthalmic emulsion Instill one drop into both eyes two times per day  Qty: 180 each, Refills: 3    Associated Diagnoses: Bilateral dry eyes      diazePAM (VALIUM) 10 MG Tab Take 1 tablet (10 mg total) by mouth once as  needed.  Qty: 1 tablet, Refills: 0      diclofenac sodium (VOLTAREN) 1 % Gel APPLY 2 GRAMS TOPICALLY 4 (FOUR) TIMES DAILY AS NEEDED.  Qty: 300 g, Refills: 2    Associated Diagnoses: Osteoarthritis, unspecified osteoarthritis type, unspecified site      estrogens, conjugated, (PREMARIN) 0.625 MG tablet take 1 tablet by mouth daily  Qty: 90 tablet, Refills: 4      fluconazole (DIFLUCAN) 100 MG tablet Take 1 tablet by mouth once daily for 14 days.  Qty: 14 tablet, Refills: 0      halobetasol propion-tazarotene (DUOBRII) 0.01-0.045 % Lotn aaa on feet and hands qhs  then vaseline and warm towel  Qty: 100 g, Refills: 3    Associated Diagnoses: Psoriasiform dermatitis      INV PROPULSID 10 MG Take 2 tablets (20 mg) by mouth 4 (four) times daily. FOR INVESTIGATIONAL USE ONLY. Protocol [id number]  Subject ID: [id number].  Qty: 1440 each, Refills: 0    Associated Diagnoses: Patient in clinical research study; Gastroparesis      ketoconazole (NIZORAL) 2 % cream Apply to feet twice daily for 3 weeks  Qty: 60 g, Refills: 3    Associated Diagnoses: Tinea pedis of both feet      leflunomide (ARAVA) 20 MG Tab Take 1 tablet (20 mg total) by mouth once daily.  Qty: 90 tablet, Refills: 0    Associated Diagnoses: Rheumatoid arthritis      lifitegrast (XIIDRA) 5 % Dpet INSTILL ONE DROP INTO BOTH EYES TWICE A DAY  Qty: 60 mL, Refills: 10      magic mouthwash diphen/antac/lidoc Swish and Spit 5 mL by mouth for 30 seconds twice daily.  Qty: 150 mL, Refills: 0      methenamine (HIPREX) 1 gram Tab Take 1 tablet (1 g total) by mouth 2 (two) times daily.  Qty: 180 tablet, Refills: 3      methocarbamoL (ROBAXIN) 500 MG Tab Take 2 tablets (1,000 mg total) by mouth 3 (three) times daily as needed (muscle pain).  Qty: 180 tablet, Refills: 2    Associated Diagnoses: Spinal stenosis of lumbar region, unspecified whether neurogenic claudication present; Osteoarthritis of spine with radiculopathy, lumbar region      mirabegron (MYRBETRIQ) 25 mg Tb24 ER  tablet Take 1 tablet (25 mg total) by mouth once daily.  Qty: 90 tablet, Refills: 0    Associated Diagnoses: Urinary urgency; Urge urinary incontinence      montelukast (SINGULAIR) 10 mg tablet TAKE 1 TABLET BY MOUTH EVERY DAY AT NIGHT  Qty: 90 tablet, Refills: 3    Associated Diagnoses: Perennial allergic rhinitis      naproxen (NAPROSYN) 500 MG tablet Take 1 tablet (500 mg total) by mouth 2 (two) times daily as needed (prn).  Qty: 180 tablet, Refills: 1      omeprazole (PRILOSEC) 40 MG capsule Take 1 capsule (40 mg total) by mouth every morning.  Qty: 30 capsule, Refills: 6      omeprazole-sodium bicarbonate (ZEGERID) 40-1.1 mg-gram per capsule TAKE 1 CAPSULE BY MOUTH EVERY DAY IN THE MORNING  Qty: 90 capsule, Refills: 3      POTASSIUM ORAL Take by mouth once daily.      !! predniSONE (DELTASONE) 1 MG tablet TAKE 5 TABLETS (5 MG TOTAL) BY MOUTH ONCE DAILY.  Qty: 450 tablet, Refills: 1      !! predniSONE (DELTASONE) 5 MG tablet Take 1 tablet (5 mg total) by mouth once daily.  Qty: 90 tablet, Refills: 1      pregabalin (LYRICA) 50 MG capsule Take 3 capsules (150 mg total) by mouth every evening.  Qty: 270 capsule, Refills: 1    Associated Diagnoses: Fibromyalgia      PREMARIN vaginal cream PLACE 0.5 GRAM VAGINALLY 3 (THREE) TIMES A WEEK.  Qty: 30 g, Refills: 1      !! progesterone (PROMETRIUM) 100 MG capsule Take 1 capsule by mouth daily.  Qty: 90 capsule, Refills: 1    Associated Diagnoses: Unspecified ovarian cyst, unspecified side; Other specified counseling      !! progesterone (PROMETRIUM) 100 MG capsule take 1 capsule by mouth daily  Qty: 90 capsule, Refills: 4      !! promethazine (PHENERGAN) 25 MG tablet Take 1 tablet (25 mg total) by mouth every 6 (six) hours as needed for Nausea.  Qty: 30 tablet, Refills: 1      !! promethazine (PHENERGAN) 25 MG tablet Take 1 tablet (25 mg total) by mouth every 6 (six) hours as needed for Nausea.  Qty: 15 tablet, Refills: 0    Comments: Bedside delivery [facility]  1/11/23 with Dr. Finnegan      rosuvastatin (CRESTOR) 20 MG tablet Take 1 tablet (20 mg total) by mouth every evening.  Qty: 90 tablet, Refills: 3    Associated Diagnoses: Coronary artery disease involving native coronary artery of native heart without angina pectoris      sarilumab (KEVZARA) 200 mg/1.14 mL Syrg Inject 1.14 mL (200 mg total) into the skin every 14 (fourteen) days.  Qty: 2.28 mL, Refills: 2    Associated Diagnoses: Rheumatoid arthritis, involving unspecified site, unspecified whether rheumatoid factor present      !! sucralfate (CARAFATE) 1 gram tablet TAKE 1 TABLET (1 G TOTAL) BY MOUTH 4 (FOUR) TIMES DAILY.  Qty: 360 tablet, Refills: 2    Associated Diagnoses: Gastroparesis      !! sucralfate (CARAFATE) 1 gram tablet Take 1 tablet (1 g total) by mouth 4 (four) times daily.  Qty: 360 tablet, Refills: 3    Associated Diagnoses: Gastroparesis      traMADoL (ULTRAM) 50 mg tablet Take 1 tablet (50 mg total) by mouth every 12 (twelve) hours as needed for Pain.  Qty: 60 tablet, Refills: 0    Comments: Quantity prescribed more than 7 day supply? Yes, quantity medically necessary  Associated Diagnoses: Lumbar radiculopathy; Osteoarthritis of spine with radiculopathy, lumbar region; Chronic pain syndrome       !! - Potential duplicate medications found. Please discuss with provider.              Discharge Diagnosis: Spinal stenosis of lumbar region, unspecified whether neurogenic claudication present [M48.061]  Osteoarthritis of spine with radiculopathy, lumbar region [M47.26]  Condition on Discharge: Stable with no complications to procedure   Diet on Discharge: Same as before.  Activity: as per instruction sheet.  Discharge to: Home with a responsible adult.  Follow up: 2-4 weeks       Please call my office or pager at [phone] if experienced any weakness or loss of sensation, fever > 101.5, pain uncontrolled with oral medications, persistent nausea/vomiting/or diarrhea, redness or drainage from the  incisions, or any other worrisome concerns. If physician on call was not reached or could not communicate with our office for any reason please go to the nearest emergency department

## 2023-04-06 NOTE — INTERVAL H&P NOTE
The patient has been examined and the H&P has been reviewed:    I concur with the findings and changes have been noted since the H&P was written: Patient reports allergy to contrast, occurred years ago and does not recall details of reaction. Chart indicates burning. She reports good tolerance of contrast with benadryl prior to the procedure.  Will provide benadryl today.    Procedure risks, benefits and alternative options discussed and understood by patient/family.          There are no hospital problems to display for this patient.

## 2023-04-06 NOTE — OP NOTE
Lumbar Interlaminar Epidural Steroid Injection under Fluoroscopic Guidance    The procedure, risks, benefits, and options were discussed with the patient. There are no contraindications to the procedure. The patent expressed understanding and agreed to the procedure. Informed written consent was obtained prior to the start of the procedure and can be found in the patient's chart.    PATIENT NAME: Joyce Peterson   MRN: [id number]     DATE OF PROCEDURE: 04/06/2023    PROCEDURE: Lumbar Interlaminar Epidural Steroid Injection L5/S1 under Fluoroscopic Guidance    PRE-OP DIAGNOSIS: Spinal stenosis of lumbar region, unspecified whether neurogenic claudication present [M48.061]  Osteoarthritis of spine with radiculopathy, lumbar region [M47.26] Lumbar radiculopathy [M54.16]    POST-OP DIAGNOSIS: Same    PHYSICIAN: Tj Mayfield MD    ASSISTANTS: None     MEDICATIONS INJECTED: Preservative-free Decadron 10mg with 4cc of Lidocaine 1% MPF and preservative free normal saline    LOCAL ANESTHETIC INJECTED: Xylocaine 2%     SEDATION: Versed 2mg and Fentanyl 25mcg                                                                                                                                                                                     Conscious sedation ordered by M.D. Patient re-evaluation prior to administration of conscious sedation. No changes noted in patient's status from initial evaluation. The patient's vital signs were monitored by RN and patient remained hemodynamically stable throughout the procedure.    Event Time In   Sedation Start 0822   Sedation End 0826       ESTIMATED BLOOD LOSS: None    COMPLICATIONS: None    TECHNIQUE: Time-out was performed to identify the patient and procedure to be performed. With the patient laying in a prone position, the surgical area was prepped and draped in the usual sterile fashion using ChloraPrep and a fenestrated drape. The level was determined under fluoroscopy guidance. Skin  anesthesia was achieved by injecting Lidocaine 2% over the injection site. The interlaminar space was then approached with a 20 gauge,  3.5 inch Tuohy needle that was introduced under fluoroscopic guidance in the AP, lateral and/or contralateral oblique imaging. Once the Ligamentum flavum was encountered loss of resistance to saline was used to enter the epidural space. With positive loss of resistance and negative aspiration for CSF or Blood, contrast dye Omnipaque (240mg/mL) was injected to confirm placement and there was no vascular runoff. 5 mL of the medication mixture listed above was injected slowly. Displacement of the radio opaque contrast after injection of the medication confirmed that the medication went into the area of the epidural space. The needles were removed and bleeding was nil. A sterile dressing was applied. No specimens collected. The patient tolerated the procedure well.       The patient was monitored after the procedure in the recovery area. They were given post-procedure and discharge instructions to follow at home. The patient was discharged in a stable condition.    PAIN BEFORE THE PROCEDURE: 8/10    PAIN AFTER THE PROCEDURE: 0/10      Tj Mayfield MD

## 2023-04-06 NOTE — DISCHARGE INSTRUCTIONS

## 2023-04-10 ENCOUNTER — HOSPITAL ENCOUNTER (OUTPATIENT)
Dept: RADIOLOGY | Facility: HOSPITAL | Age: 63
Discharge: HOME | End: 2023-04-10
Attending: PHYSICIAN ASSISTANT
Payer: MEDICARE

## 2023-04-10 ENCOUNTER — OFFICE VISIT (OUTPATIENT)
Dept: SPORTS MEDICINE | Facility: CLINIC | Age: 63
End: 2023-04-10
Payer: MEDICARE

## 2023-04-10 ENCOUNTER — PATIENT MESSAGE (OUTPATIENT)
Dept: GASTROENTEROLOGY | Facility: CLINIC | Age: 63
End: 2023-04-10
Payer: MEDICARE

## 2023-04-10 VITALS
DIASTOLIC BLOOD PRESSURE: 71 MMHG | BODY MASS INDEX: 26.81 KG/M2 | WEIGHT: 142 LBS | HEART RATE: 100 BPM | HEIGHT: 61 IN | SYSTOLIC BLOOD PRESSURE: 123 MMHG

## 2023-04-10 DIAGNOSIS — M17.11 PRIMARY OSTEOARTHRITIS OF RIGHT KNEE: ICD-10-CM

## 2023-04-10 DIAGNOSIS — M25.461 EFFUSION OF RIGHT KNEE: ICD-10-CM

## 2023-04-10 DIAGNOSIS — M15.9 OSTEOARTHRITIS INVOLVING MULTIPLE JOINTS ON BOTH SIDES OF BODY: ICD-10-CM

## 2023-04-10 DIAGNOSIS — M25.551 RIGHT HIP PAIN: ICD-10-CM

## 2023-04-10 DIAGNOSIS — M25.551 RIGHT HIP PAIN: Primary | ICD-10-CM

## 2023-04-10 PROCEDURE — 99214 OFFICE O/P EST MOD 30 MIN: CPT | Mod: 25,S$GLB,, | Performed by: PHYSICIAN ASSISTANT

## 2023-04-10 PROCEDURE — 99999 PR PBB SHADOW E&M-EST. PATIENT-LVL V: CPT | Mod: PBBFAC,,, | Performed by: PHYSICIAN ASSISTANT

## 2023-04-10 PROCEDURE — 1160F PR REVIEW ALL MEDS BY PRESCRIBER/CLIN PHARMACIST DOCUMENTED: ICD-10-PCS | Mod: CPTII,S$GLB,, | Performed by: PHYSICIAN ASSISTANT

## 2023-04-10 PROCEDURE — 99214 PR OFFICE/OUTPT VISIT, EST, LEVL IV, 30-39 MIN: ICD-10-PCS | Mod: 25,S$GLB,, | Performed by: PHYSICIAN ASSISTANT

## 2023-04-10 PROCEDURE — 3078F PR MOST RECENT DIASTOLIC BLOOD PRESSURE < 80 MM HG: ICD-10-PCS | Mod: CPTII,S$GLB,, | Performed by: PHYSICIAN ASSISTANT

## 2023-04-10 PROCEDURE — 1159F PR MEDICATION LIST DOCUMENTED IN MEDICAL RECORD: ICD-10-PCS | Mod: CPTII,S$GLB,, | Performed by: PHYSICIAN ASSISTANT

## 2023-04-10 PROCEDURE — 73521 XR HIPS BILATERAL 2 VIEW INCL AP PELVIS: ICD-10-PCS | Mod: 26,,, | Performed by: RADIOLOGY

## 2023-04-10 PROCEDURE — 3074F PR MOST RECENT SYSTOLIC BLOOD PRESSURE < 130 MM HG: ICD-10-PCS | Mod: CPTII,S$GLB,, | Performed by: PHYSICIAN ASSISTANT

## 2023-04-10 PROCEDURE — 3074F SYST BP LT 130 MM HG: CPT | Mod: CPTII,S$GLB,, | Performed by: PHYSICIAN ASSISTANT

## 2023-04-10 PROCEDURE — 1159F MED LIST DOCD IN RCRD: CPT | Mod: CPTII,S$GLB,, | Performed by: PHYSICIAN ASSISTANT

## 2023-04-10 PROCEDURE — 99999 PR PBB SHADOW E&M-EST. PATIENT-LVL V: ICD-10-PCS | Mod: PBBFAC,,, | Performed by: PHYSICIAN ASSISTANT

## 2023-04-10 PROCEDURE — 20610 DRAIN/INJ JOINT/BURSA W/O US: CPT | Mod: 79,RT,S$GLB, | Performed by: PHYSICIAN ASSISTANT

## 2023-04-10 PROCEDURE — 1160F RVW MEDS BY RX/DR IN RCRD: CPT | Mod: CPTII,S$GLB,, | Performed by: PHYSICIAN ASSISTANT

## 2023-04-10 PROCEDURE — 3008F BODY MASS INDEX DOCD: CPT | Mod: CPTII,S$GLB,, | Performed by: PHYSICIAN ASSISTANT

## 2023-04-10 PROCEDURE — 20610 PR DRAIN/INJECT LARGE JOINT/BURSA: ICD-10-PCS | Mod: 79,RT,S$GLB, | Performed by: PHYSICIAN ASSISTANT

## 2023-04-10 PROCEDURE — 73521 X-RAY EXAM HIPS BI 2 VIEWS: CPT | Mod: 26,,, | Performed by: RADIOLOGY

## 2023-04-10 PROCEDURE — 3008F PR BODY MASS INDEX (BMI) DOCUMENTED: ICD-10-PCS | Mod: CPTII,S$GLB,, | Performed by: PHYSICIAN ASSISTANT

## 2023-04-10 PROCEDURE — 3078F DIAST BP <80 MM HG: CPT | Mod: CPTII,S$GLB,, | Performed by: PHYSICIAN ASSISTANT

## 2023-04-10 PROCEDURE — 73521 X-RAY EXAM HIPS BI 2 VIEWS: CPT | Mod: TC

## 2023-04-10 RX ORDER — FLUCONAZOLE 100 MG/1
TABLET ORAL
Qty: 14 TABLET | Refills: 0 | Status: SHIPPED | OUTPATIENT
Start: 2023-04-10 | End: 2023-06-08 | Stop reason: CLARIF

## 2023-04-10 RX ORDER — ROPIVACAINE HYDROCHLORIDE 2 MG/ML
4 INJECTION, SOLUTION EPIDURAL; INFILTRATION
Status: COMPLETED | OUTPATIENT
Start: 2023-04-10 | End: 2023-04-10

## 2023-04-10 RX ORDER — TRIAMCINOLONE ACETONIDE 40 MG/ML
40 INJECTION, SUSPENSION INTRA-ARTICULAR; INTRAMUSCULAR
Status: COMPLETED | OUTPATIENT
Start: 2023-04-10 | End: 2023-04-10

## 2023-04-10 RX ORDER — METHOCARBAMOL 750 MG/1
750 TABLET, FILM COATED ORAL 4 TIMES DAILY PRN
Qty: 40 TABLET | Refills: 0 | Status: SHIPPED | OUTPATIENT
Start: 2023-04-10 | End: 2023-04-24

## 2023-04-10 RX ADMIN — ROPIVACAINE HYDROCHLORIDE 4 ML: 2 INJECTION, SOLUTION EPIDURAL; INFILTRATION at 02:04

## 2023-04-10 RX ADMIN — TRIAMCINOLONE ACETONIDE 40 MG: 40 INJECTION, SUSPENSION INTRA-ARTICULAR; INTRAMUSCULAR at 02:04

## 2023-04-10 NOTE — PROGRESS NOTES
CC: Right knee and right hip pain    62 y.o. Female who presents for follow up evaluation for her right knee and hip. Patient with history of rheumatoid arthritis and has recently completed Synvisc series on 3/23. She returns complaining of residual pain and swelling in the knee. I previous discussed this with Dr. Burgess and she is here today for aspiration and CSI in the R knee.    Additionally patient with history of R hip pain. She says that the hip has been bothering her for months and is getting progressively worse. She says she feels clicking over the anterior aspect of her hip when she ambulates. Says the pain is worse when she lays down at night. Sometimes the pain is point tender over the anterior aspect of her hip. She says that at other times she has pain radiating from her buttock to the lateral hip and down her IT band to her knee. Worse with prolonged ambulation or standing but also wakes her up from sleep. Better with rest. Denies pain radiating to groin or numbness/tingling of the RLE. Treatment thus far has included rest, activity modifications, oral medications. Here today to discuss diagnosis and treatment options. Of note, patient has a history of L femur IMN with Dr. Duff in 5/2019 secondary to L intertrochanteric hip fracture. She then has a periprosthetic hip fracture in 2021 that was treated nonoperatively. Patient says that her initial hip fracture did not have any SAADIA- her pain at that time started as dull and progressed. She was ambulating on the hip without pain before imaging demonstrated a fracture.    Of note patient recently underwent left foot fusion surgery with Dr. Finnegan on 1/11/23.    Also under the care of pain management for chronic low back pain.    PMHx notable for RA- currently on Kevzara, Arava, and 2mg Prednisone daily. Sees Dr. Isiah Moran.    Negative for tobacco.   Negative for diabetes.    Pain Score:   7    REVIEW OF SYSTEMS:   Constitution: Negative. Negative for  chills, fever and night sweats.    Hematologic/Lymphatic: Negative for bleeding problem. Does not bruise/bleed easily.   Skin: Negative for dry skin, itching and rash.   Musculoskeletal: Negative for falls. Positive for right knee and hip pain and muscle weakness.     All other review of symptoms were reviewed and found to be noncontributory.     PAST MEDICAL HISTORY:   Past Medical History:   Diagnosis Date    Acid reflux     Allergy     Anemia     Asthma     Coronary artery disease     CS (cervical spondylosis) 03/08/2013    Degenerative disc disease     Dry eyes     Dry mouth     Gastroparesis     History of methotrexate therapy 01/19/2022    Hyperlipidemia     Lateral meniscus derangement 04/06/2016    Lobular carcinoma in situ     Lumbar spondylosis 03/08/2013    Osteoarthritis     Osteoporosis     Rheumatoid arthritis(714.0)     Rupture of left triceps tendon 10/17/2018    Umbilical hernia 08/13/2015       PAST SURGICAL HISTORY:   Past Surgical History:   Procedure Laterality Date    BREAST BIOPSY Left 01/29/2002    core bx    CARPAL TUNNEL RELEASE Right 05/2017    CHOLECYSTECTOMY  2004    COLONOSCOPY      10/11    COLONOSCOPY N/A 6/29/2017    Procedure: COLONOSCOPY;  Surgeon: López Moore MD;  Location: [facility] ENDO ([address] [place] [address]);  Service: Endoscopy;  Laterality: N/A;    EPIDURAL STEROID INJECTION N/A 11/18/2021    Procedure: INJECTION, STEROID, EPIDURAL, L5-S1 IL NEED CONSENT;  Surgeon: Tj Mayfield MD;  Location: [facility] PAIN MGT;  Service: Pain Management;  Laterality: N/A;    EPIDURAL STEROID INJECTION N/A 9/29/2022    Procedure: LUMBAR L3/L4 IL MADELINE CONTRAST;  Surgeon: Tj Mayfield MD;  Location: [facility] PAIN MGT;  Service: Pain Management;  Laterality: N/A;    EPIDURAL STEROID INJECTION N/A 12/12/2022    Procedure: INJECTION, STEROID, EPIDURAL L5/S1 IL CONTRAST;  Surgeon: Tj Mayfield MD;  Location: [facility] PAIN MGT;  Service: Pain Management;  Laterality: N/A;    EPIDURAL STEROID INJECTION N/A 4/6/2023    Procedure:  INJECTION, STEROID, EPIDURAL L5/S1;  Surgeon: Tj Mayfield MD;  Location: [facility] PAIN MGT;  Service: Pain Management;  Laterality: N/A;    FOOT ARTHRODESIS Left 1/11/2023    Procedure: FUSION, FOOT;  Surgeon: Ariella Finnegan MD;  Location: [facility] OR;  Service: Orthopedics;  [facility]: [facility]  [facility]    HARVESTING OF BONE GRAFT Left 1/11/2023    Procedure: SURGICAL PROCUREMENT, BONE GRAFT;  Surgeon: Ariella Finnegan MD;  Location: [facility] OR;  Service: Orthopedics;  Laterality: Left;    INJECTION OF ANESTHETIC AGENT AROUND NERVE Bilateral 8/23/2021    Procedure: BLOCK, NERVE, MEDIAL BRANCH L3,L4,L5;  Surgeon: Tj Mayfield MD;  Location: [facility] PAIN MGT;  Service: Pain Management;  Laterality: Bilateral;  1 of 2    INJECTION OF ANESTHETIC AGENT AROUND NERVE Bilateral 8/26/2021    Procedure: BLOCK, NERVE, MEDIAL BRANCH L3,L4,L5;  Surgeon: Tj Mayfield MD;  Location: [facility] PAIN MGT;  Service: Pain Management;  Laterality: Bilateral;  2 of 2    INJECTION OF ANESTHETIC AGENT AROUND NERVE Left 7/7/2022    Procedure: BLOCK, NERVE, LEFT OBTURATOR AND FEMORAL;  Surgeon: Tj Mayfield MD;  Location: [facility] PAIN MGT;  Service: Pain Management;  Laterality: Left;    INJECTION OF FACET JOINT Bilateral 3/12/2020    Procedure: FACET JOINT INJECTION (LUMBAR BLOCK) BILATERAL L4-5 AND L5-S1 DIRECT REFERRAL;  Surgeon: Tj Mayfield MD;  Location: [facility] PAIN [facility]GT;  Service: Pain Management;  Laterality: Bilateral;  NEEDS CONSENT    INJECTION OF FACET JOINT Bilateral 3/29/2021    Procedure: INJECTION, FACET JOINT L3/4, L4/5, L5/S1;  Surgeon: Tj Mayfield MD;  Location: [facility]IN [facility]GT;  Service: Pain Management;  Laterality: Bilateral;    INJECTION OF JOINT Right 7/30/2020    Procedure: INJECTION, JOINT, RIGHT HIP Interarticular under flouro;  Surgeon: Tj Mayfield MD;  Location: [facility]IN [facility]GT;  Service: Pain Management;  Laterality: Right;  INJECTION, JOINT, RIGHT HIP Interarticular under flouro    INJECTION OF JOINT Right 2/21/2022    Procedure: Injection,  Joint RIGHT ISCHIAL BURSA;  Surgeon: Tj Mayfield MD;  Location: [facility] PAIN [facility]GT;  Service: Pain Management;  Laterality: Right;    INTRAMEDULLARY RODDING OF FEMUR Left 5/21/2019    Procedure: INSERTION, INTRAMEDULLARY ARIEL, FEMUR;  Surgeon: López Duff MD;  Location: [facility] OR 2ND FLR;  Service: Orthopedics;  Laterality: Left;    LENGTHENING OF TENDON OF LOWER EXTREMITY Left 1/11/2023    Procedure: LENGTHENING, TENDON, LOWER EXTREMITY;  Surgeon: Ariella Finnegan MD;  Location: [facility] OR;  Service: Orthopedics;  Laterality: Left;    RADIOFREQUENCY ABLATION Left 9/20/2021    Procedure: RADIOFREQUENCY ABLATION left L3,4,5 RFA  1 of 2  CONSENT NEEDED;  Surgeon: Tj Mayfield MD;  Location: [facility] PAIN MGT;  Service: Pain Management;  Laterality: Left;    RADIOFREQUENCY ABLATION Right 10/4/2021    Procedure: RADIOFREQUENCY ABLATION  right L3,4,5 RFA   2 of 2  CONSENT NEEDED;  Surgeon: Tj Mayfield MD;  Location: [facility] PAIN MGT;  Service: Pain Management;  Laterality: Right;    RADIOFREQUENCY ABLATION Left 4/21/2022    Procedure: Radiofrequency Ablation LEFT L3,L4,L5;  Surgeon: Tj Mayfield MD;  Location: [facility] PAIN MGT;  Service: Pain Management;  Laterality: Left;    RADIOFREQUENCY ABLATION Right 5/12/2022    Procedure: Radiofrequency Ablation RIGHT L3,L4,L5;  Surgeon: Tj Mayfield MD;  Location: [facility]IN [facility]GT;  Service: Pain Management;  Laterality: Right;    RADIOFREQUENCY ABLATION Left 7/25/2022    Procedure: Radiofrequency Ablation Left Femoral & Obturator;  Surgeon: Tj Mayfield MD;  Location: [facility] PAIN MGT;  Service: Pain Management;  Laterality: Left;    REPAIR OF TRICEPS TENDON Left 10/17/2018    Procedure: REPAIR, TENDON, TRICEPS left elbow;  Surgeon: Staci Yarbrough MD;  Location: [facility] OR 1ST FLR;  Service: Orthopedics;  Laterality: Left;  Anesthesia: General and regional. PRONE, k-wire , hand pan 1 and pan 2, CALL ARTHREX/Tamera notified 10-12 LO    TONSILLECTOMY      TRANSFORAMINAL EPIDURAL INJECTION  OF STEROID Right 8/31/2020    Procedure: INJECTION, STEROID, EPIDURAL, TRANSFORAMINAL,  APPROACH, L3-L4 and L4-L5 need consent;  Surgeon: Tj Mayfield MD;  Location: [facility] PAIN MGT;  Service: Pain Management;  Laterality: Right;    TRANSFORAMINAL EPIDURAL INJECTION OF STEROID Bilateral 7/23/2021    Procedure: INJECTION, STEROID, EPIDURAL, TRANSFORAMINAL APPROACH L4/5;  Surgeon: Tj Mayfield MD;  Location: [facility] PAIN MGT;  Service: Pain Management;  Laterality: Bilateral;    TRIGGER POINT INJECTION N/A 7/23/2021    Procedure: INJECTION, TRIGGER POINT SCAPULAR;  Surgeon: Tj Mayfield MD;  Location: [facility] PAIN MGT;  Service: Pain Management;  Laterality: N/A;    TUBAL LIGATION  2003    UPPER GASTROINTESTINAL ENDOSCOPY      10/11    uterine ablation  2003       FAMILY HISTORY:   Family History   Problem Relation Age of Onset    Cancer Mother         Lung Cancer    Emphysema Mother     Heart attack Mother     Alcohol abuse Mother     Cancer Father         Lung Cancer    Osteoarthritis Father     Lung cancer Father     Skin cancer Father     Alcohol abuse Father     Heart disease Brother     Heart attack Brother     Alcohol abuse Brother     Cirrhosis Brother     Osteoarthritis Paternal Aunt     Retinal detachment Maternal Aunt     No Known Problems Sister     No Known Problems Maternal Uncle     No Known Problems Paternal Uncle     No Known Problems Maternal Grandmother     No Known Problems Maternal Grandfather     No Known Problems Paternal Grandmother     No Known Problems Paternal Grandfather     Colon cancer Neg Hx     Esophageal cancer Neg Hx     Stomach cancer Neg Hx     Celiac disease Neg Hx     Diabetes Neg Hx     Thyroid disease Neg Hx     Amblyopia Neg Hx     Blindness Neg Hx     Cataracts Neg Hx     Glaucoma Neg Hx     Hypertension Neg Hx     Macular degeneration Neg Hx     Strabismus Neg Hx     Stroke Neg Hx        SOCIAL HISTORY:   Social History     Socioeconomic History    Marital status:    Tobacco  Use    Smoking status: Never    Smokeless tobacco: Never   Substance and Sexual Activity    Alcohol use: Yes     Comment: 2-3 times per year.    Drug use: No    Sexual activity: Yes     Partners: Male     Birth control/protection: Surgical     Social Determinants of Health     Financial Resource Strain: Low Risk     Difficulty of Paying Living Expenses: Not hard at all   Food Insecurity: Unknown    Worried About Running Out of Food in the Last Year: Patient refused    Ran Out of Food in the Last Year: Patient refused   Transportation Needs: Unknown    Lack of Transportation (Medical): Patient refused    Lack of Transportation (Non-Medical): Patient refused   Physical Activity: Unknown    Days of Exercise per Week: 2 days   Stress: No Stress Concern Present    Feeling of Stress : Not at all   Social Connections: Unknown    Frequency of Communication with Friends and Family: Twice a week    Frequency of Social Gatherings with Friends and Family: Twice a week    Active Member of Clubs or Organizations: Yes    Attends Club or Organization Meetings: Patient refused    Marital Status:    Housing Stability: Unknown    Unable to Pay for Housing in the Last Year: No    Unstable Housing in the Last Year: No       MEDICATIONS:     Current Outpatient Medications:     albuterol (PROVENTIL/VENTOLIN HFA) 90 mcg/actuation inhaler, INHALE 2 PUFFS INTO THE LUNGS EVERY 6 (SIX) HOURS AS NEEDED. RESCUE, Disp: 20.1 g, Rfl: 11    ALPRAZolam (XANAX) 0.5 MG tablet, Take Once on call to procedure, Disp: 1 tablet, Rfl: 0    budesonide-formoterol 160-4.5 mcg (SYMBICORT) 160-4.5 mcg/actuation HFAA, INHALE 2 PUFFS INTO THE LUNGS EVERY 12 (TWELVE) HOURS., Disp: 30.6 g, Rfl: 3    calcium citrate-vitamin D3 315-200 mg (CITRACAL+D) 315 mg-5 mcg (200 unit) per tablet, Take 1 tablet by mouth 2 (two) times daily. , Disp: , Rfl:     cycloSPORINE (RESTASIS) 0.05 % ophthalmic emulsion, Instill one drop into both eyes two times per day, Disp: 180  each, Rfl: 3    diclofenac sodium (VOLTAREN) 1 % Gel, APPLY 2 GRAMS TOPICALLY 4 (FOUR) TIMES DAILY AS NEEDED., Disp: 300 g, Rfl: 2    estrogens, conjugated, (PREMARIN) 0.625 MG tablet, take 1 tablet by mouth daily, Disp: 90 tablet, Rfl: 4    fluconazole (DIFLUCAN) 100 MG tablet, Take 1 tablet by mouth once daily for 14 days., Disp: 14 tablet, Rfl: 0    halobetasol propion-tazarotene (DUOBRII) 0.01-0.045 % Lotn, aaa on feet and hands qhs  then vaseline and warm towel (Patient taking differently: aaa on feet and hands qhs  then vaseline and warm towel), Disp: 100 g, Rfl: 3    INV PROPULSID 10 MG, Take 2 tablets (20 mg) by mouth 4 (four) times daily. FOR INVESTIGATIONAL USE ONLY. Protocol [id number] Subject ID: [id number]., Disp: 1440 each, Rfl: 0    ketoconazole (NIZORAL) 2 % cream, Apply to feet twice daily for 3 weeks (Patient taking differently: Apply to feet twice daily for 3 weeks), Disp: 60 g, Rfl: 3    leflunomide (ARAVA) 20 MG Tab, Take 1 tablet (20 mg total) by mouth once daily., Disp: 90 tablet, Rfl: 0    lifitegrast (XIIDRA) 5 % Dpet, INSTILL ONE DROP INTO BOTH EYES TWICE A DAY, Disp: 60 mL, Rfl: 10    magic mouthwash diphen/antac/lidoc, Swish and Spit 5 mL by mouth for 30 seconds twice daily., Disp: 150 mL, Rfl: 0    methenamine (HIPREX) 1 gram Tab, Take 1 tablet (1 g total) by mouth 2 (two) times daily., Disp: 180 tablet, Rfl: 3    methocarbamoL (ROBAXIN) 500 MG Tab, Take 2 tablets (1,000 mg total) by mouth 3 (three) times daily as needed (muscle pain)., Disp: 180 tablet, Rfl: 2    mirabegron (MYRBETRIQ) 25 mg Tb24 ER tablet, Take 1 tablet (25 mg total) by mouth once daily., Disp: 90 tablet, Rfl: 0    montelukast (SINGULAIR) 10 mg tablet, TAKE 1 TABLET BY MOUTH EVERY DAY AT NIGHT, Disp: 90 tablet, Rfl: 3    naproxen (NAPROSYN) 500 MG tablet, Take 1 tablet (500 mg total) by mouth 2 (two) times daily as needed (prn)., Disp: 180 tablet, Rfl: 1    omeprazole (PRILOSEC) 40 MG capsule, Take 1 capsule (40 mg total)  by mouth every morning., Disp: 30 capsule, Rfl: 6    omeprazole-sodium bicarbonate (ZEGERID) 40-1.1 mg-gram per capsule, TAKE 1 CAPSULE BY MOUTH EVERY DAY IN THE MORNING, Disp: 90 capsule, Rfl: 3    POTASSIUM ORAL, Take by mouth once daily., Disp: , Rfl:     predniSONE (DELTASONE) 1 MG tablet, TAKE 5 TABLETS (5 MG TOTAL) BY MOUTH ONCE DAILY., Disp: 450 tablet, Rfl: 1    predniSONE (DELTASONE) 5 MG tablet, Take 1 tablet (5 mg total) by mouth once daily., Disp: 90 tablet, Rfl: 1    pregabalin (LYRICA) 50 MG capsule, Take 3 capsules (150 mg total) by mouth every evening., Disp: 270 capsule, Rfl: 1    PREMARIN vaginal cream, PLACE 0.5 GRAM VAGINALLY 3 (THREE) TIMES A WEEK., Disp: 30 g, Rfl: 1    progesterone (PROMETRIUM) 100 MG capsule, Take 1 capsule by mouth daily., Disp: 90 capsule, Rfl: 1    progesterone (PROMETRIUM) 100 MG capsule, take 1 capsule by mouth daily, Disp: 90 capsule, Rfl: 4    promethazine (PHENERGAN) 25 MG tablet, Take 1 tablet (25 mg total) by mouth every 6 (six) hours as needed for Nausea., Disp: 30 tablet, Rfl: 1    promethazine (PHENERGAN) 25 MG tablet, Take 1 tablet (25 mg total) by mouth every 6 (six) hours as needed for Nausea., Disp: 15 tablet, Rfl: 0    rosuvastatin (CRESTOR) 20 MG tablet, Take 1 tablet (20 mg total) by mouth every evening., Disp: 90 tablet, Rfl: 3    sarilumab (KEVZARA) 200 mg/1.14 mL Syrg, Inject 1.14 mL (200 mg total) into the skin every 14 (fourteen) days., Disp: 2.28 mL, Rfl: 2    sucralfate (CARAFATE) 1 gram tablet, TAKE 1 TABLET (1 G TOTAL) BY MOUTH 4 (FOUR) TIMES DAILY., Disp: 360 tablet, Rfl: 2    sucralfate (CARAFATE) 1 gram tablet, Take 1 tablet (1 g total) by mouth 4 (four) times daily., Disp: 360 tablet, Rfl: 3    traMADoL (ULTRAM) 50 mg tablet, Take 1 tablet (50 mg total) by mouth every 12 (twelve) hours as needed for Pain., Disp: 60 tablet, Rfl: 0    albuterol-ipratropium (DUO-NEB) 2.5 mg-0.5 mg/3 mL nebulizer solution, Take 3 mLs by nebulization every 6 (six)  "hours as needed for Wheezing. Rescue, Disp: 90 mL, Rfl: 3    aspirin 325 MG tablet, Take 1 tablet (325 mg total) by mouth once daily., Disp: 30 tablet, Rfl: 0    diazePAM (VALIUM) 10 MG Tab, Take 1 tablet (10 mg total) by mouth once as needed., Disp: 1 tablet, Rfl: 0    ALLERGIES:   Review of patient's allergies indicates:   Allergen Reactions    Actemra [tocilizumab] Other (See Comments)     Severe dizziness    Codeine Nausea And Vomiting and Hives    Gold au 198 Hives and Rash    Hydroxychloroquine Other (See Comments)     Can't remember the reaction      Iodinated contrast media Other (See Comments)     Other reaction(s): BURNING ALL OVER    Iodine Other (See Comments) and Hives     Other reaction(s): BURNING ALL OVER - Iodine dye - Not topical    Ondansetron Nausea And Vomiting    Sulfa (sulfonamide antibiotics) Other (See Comments)     Can't remember the reaction    Zofran [ondansetron hcl (pf)] Nausea And Vomiting     Pt reports that last time she received zofran she started vomiting again    Methotrexate analogues Nausea Only    Pneumococcal vaccine Other (See Comments)    Pneumovax 23 [pneumococcal 23-argenis ps vaccine] Other (See Comments)     "sick"    Methotrexate Nausea Only      PHYSICAL EXAMINATION:  /71   Pulse 100   Ht 5' 1" (1.549 m)   Wt 64.4 kg (142 lb)   LMP  (LMP Unknown)   BMI 26.83 kg/m²   General: Well-developed well-nourished 62 y.o. femalein no acute distress   Cardiovascular: Regular rhythm by palpation of distal pulse, normal color and temperature, no concerning varicosities on symptomatic side   Lungs: No labored breathing or wheezing appreciated   Neuro: Alert and oriented ×3   Psychiatric: well oriented to person, place and time, demonstrates normal mood and affect   Skin: No rashes, lesions or ulcers, normal temperature, turgor, and texture on involved extremity    Ortho/SPM Exam  Examination of the right hip demonstrates full painless ROM of the hip without pain. She is " tender to palpation over the greater trochanter as well as the IT band. Hip ROM- flexion to 120, abduction to 40, IR to 20, ER to 45 without pain. Negative log roll for pain. Negative stinchfield. NVI.    Examination of the right knee again demonstrates intact extensor mechanism. 2+ effusion. Central patellar tracking. No patellar apprehension. Normal patellar mobility. About 10 degrees from full extension. Flexion to 115. Pain with forced flexion or extension. Mild tenderness along the medial and lateral joint lines. Negative John's for any specific pain or mechanical symptoms. Negative Lachman. Stable to varus/valgus stress testing at 0 and 30 deg. Negative posterior drawer. Ligamentously stable.    Antalgic gait secondary to pain in the right hip and knee.    IMAGING:  X-rays including standing, weight bearing AP and flexion bilateral knees, RIGHT knee lateral and sunrise views ordered and images reviewed by me show:    Tricompartmental degenerative arthritis of right knee. KL2-3.    MRI reviewed by me and discussed with patient. Study shows: Complete radial tear at the junction of the posterior horn/posterior root ligament of the medial meniscus with surrounding synovitis.  Complex tear anterior body/anterior horn lateral meniscus that closely approximates the anterior root ligament.  Tricompartmental osteoarthritis with mild chondral loss    X-rays of the right hip done today in clinic and reviewed by me demonstrates no acute fracture or osseous abnormalities.     ASSESSMENT:      ICD-10-CM ICD-9-CM   1. Right hip pain  M25.551 719.45   2. Osteoarthritis involving multiple joints on both sides of body  M15.9 715.89   3. Effusion of right knee  M25.461 719.06   4. Primary osteoarthritis of right knee  M17.11 715.16       PLAN:     -For the right knee:  We have discussed a variety of treatment options including medications, injections, physical therapy and other alternative treatments. Pt is requesting CSI at  this time.    I made the decision to obtain old records of the patient including previous notes and imaging. I independently reviewed and interpreted lab results today as well as prior imaging.     Aspiration/Injection Procedure    A time out was performed, including verification of patient ID, procedure, site and side, availability of information and equipment, review of safety issues, and agreement with consent, the procedure site was marked.    Aspiration:  After time out was performed, the patient was prepped aseptically with povidone-iodine swabsticks. 35cc's of normal joint fluid was aspirated from the Superolateral  aspect of the right Knee Joint using a 22g x 1.5 needle in sterile fashion without complication.     Injection:  Following aspiration, a diagnostic and therapeutic injection of 4:1cc Kenalog/Naropin was given under sterile technique in the supine position.     Joyce Peterson had no adverse reactions to the medication. Pain decreased. She was instructed to apply ice to the joint for 20 minutes and avoid strenuous activities for 24-36 hours following the injection. She was warned of possible blood sugar and/or blood pressure changes during that time. Following that time, she can resume regular activities.     She was reminded to call the clinic immediately for any adverse side effects as explained in clinic today.    2. Continue Naproxen for pain management. Patient understands to take with food and/or OTC prilosec to decrease GI side effects.  3. Ice compress to the affected area 2-3x a day for 15-20 minutes as needed for pain management.  4. [id number] Rafaela Weems performed a custom orthotic / brace adjustment, fitting and training with the patient. The patient demonstrated understanding and proper care. This was performed for 10 minutes.       For the Right hip:  Although patient's physical exam does not create high suspicion for hip fracture, due to the patient's history I believe further  imaging is warranted.    I made the decision to obtain old records of the patient including previous notes and imaging. I independently reviewed and interpreted lab results today as well as prior imaging.     1. Will order CT without contrast of the right hip today to evaluate/rule out possible occult hip fracture.    All of the patient's questions were answered and the patient will contact us if they have any questions or concerns in the interim.     Procedures

## 2023-04-13 ENCOUNTER — HOSPITAL ENCOUNTER (OUTPATIENT)
Dept: RADIOLOGY | Facility: HOSPITAL | Age: 63
Discharge: HOME | End: 2023-04-13
Attending: PHYSICIAN ASSISTANT
Payer: MEDICARE

## 2023-04-13 DIAGNOSIS — M25.551 RIGHT HIP PAIN: ICD-10-CM

## 2023-04-13 PROCEDURE — 73700 CT LOWER EXTREMITY W/O DYE: CPT | Mod: TC,RT

## 2023-04-13 PROCEDURE — 73700 CT HIP WITHOUT CONTRAST RIGHT: ICD-10-PCS | Mod: 26,RT,, | Performed by: INTERNAL MEDICINE

## 2023-04-13 PROCEDURE — 73700 CT LOWER EXTREMITY W/O DYE: CPT | Mod: 26,RT,, | Performed by: INTERNAL MEDICINE

## 2023-04-14 ENCOUNTER — PATIENT MESSAGE (OUTPATIENT)
Dept: SPORTS MEDICINE | Facility: CLINIC | Age: 63
End: 2023-04-14
Payer: MEDICARE

## 2023-04-17 ENCOUNTER — OFFICE VISIT (OUTPATIENT)
Dept: SPORTS MEDICINE | Facility: CLINIC | Age: 63
End: 2023-04-17
Payer: MEDICARE

## 2023-04-17 ENCOUNTER — PATIENT MESSAGE (OUTPATIENT)
Dept: SPORTS MEDICINE | Facility: CLINIC | Age: 63
End: 2023-04-17

## 2023-04-17 DIAGNOSIS — M25.551 ACUTE RIGHT HIP PAIN: ICD-10-CM

## 2023-04-17 DIAGNOSIS — M25.551 RIGHT HIP PAIN: Primary | ICD-10-CM

## 2023-04-17 DIAGNOSIS — M25.551 CHRONIC RIGHT HIP PAIN: Primary | ICD-10-CM

## 2023-04-17 DIAGNOSIS — G89.29 CHRONIC RIGHT HIP PAIN: Primary | ICD-10-CM

## 2023-04-17 PROCEDURE — 1159F MED LIST DOCD IN RCRD: CPT | Mod: CPTII,95,, | Performed by: PHYSICIAN ASSISTANT

## 2023-04-17 PROCEDURE — 1159F PR MEDICATION LIST DOCUMENTED IN MEDICAL RECORD: ICD-10-PCS | Mod: CPTII,95,, | Performed by: PHYSICIAN ASSISTANT

## 2023-04-17 PROCEDURE — 99213 OFFICE O/P EST LOW 20 MIN: CPT | Mod: 95,,, | Performed by: PHYSICIAN ASSISTANT

## 2023-04-17 PROCEDURE — 99213 PR OFFICE/OUTPT VISIT, EST, LEVL III, 20-29 MIN: ICD-10-PCS | Mod: 95,,, | Performed by: PHYSICIAN ASSISTANT

## 2023-04-17 PROCEDURE — 1160F RVW MEDS BY RX/DR IN RCRD: CPT | Mod: CPTII,95,, | Performed by: PHYSICIAN ASSISTANT

## 2023-04-17 PROCEDURE — 1160F PR REVIEW ALL MEDS BY PRESCRIBER/CLIN PHARMACIST DOCUMENTED: ICD-10-PCS | Mod: CPTII,95,, | Performed by: PHYSICIAN ASSISTANT

## 2023-04-17 RX ORDER — DIAZEPAM 2 MG/1
2 TABLET ORAL EVERY 6 HOURS PRN
Qty: 2 TABLET | Refills: 0 | Status: SHIPPED | OUTPATIENT
Start: 2023-04-17 | End: 2023-05-15

## 2023-04-17 NOTE — PROGRESS NOTES
Telemedicine/Virtual Visit Documentation:     The patient location is: home, CT results review    The chief complaint leading to consultation is: see HPI from previous visit    HPI:  62 y.o. female with history of Right hip pain. She says that the hip has been bothering her for months and is getting progressively worse. She says she feels clicking over the anterior aspect of her hip when she ambulates. Says the pain is worse when she lays down at night. Sometimes the pain is point tender over the anterior aspect of her hip. She says that at other times she has pain radiating from her buttock to the lateral hip and down her IT band to her knee. Worse with prolonged ambulation or standing but also wakes her up from sleep. Better with rest. Denies pain radiating to groin or numbness/tingling of the RLE. Treatment thus far has included rest, activity modifications, oral medications. Here today to discuss diagnosis and treatment options. Of note, patient has a history of L femur IMN with Dr. Duff in 5/2019 secondary to L intertrochanteric hip fracture. She then has a periprosthetic hip fracture in 2021 that was treated nonoperatively. Patient says that her initial hip fracture did not have any SAADIA- her pain at that time started as dull and progressed. She was ambulating on the hip without pain before imaging demonstrated a fracture.     Of note patient recently underwent left foot fusion surgery with Dr. Finnegan on 1/11/23.     Also under the care of pain management for chronic low back pain.     PMHx notable for RA- currently on Kevzara, Arava, and 2mg Prednisone daily. Sees Dr. Isiah Moran.      VISIT TYPE X   Virtual visit with synchronous audio and video    Telephone E/M service x     Total time spent with patient: see X harsha on chart below.   More than half of the time was spent counseling or coordinating care including prognosis, differential diagnosis, risks and benefits of treatment, instructions, compliance  risk reductions     EST MINUTES X   [id number] 5    22847 10    44056 15    22146 25 x   [id number] 40    NEW     24895 10    69521 20    08794 30    78610 45    16733 60    PHONE      5-10    85840 11-20    29475 21-30 x       CT Right hip w/o contrast done 4/10/23  MSK:  Impression does not comment on a fracture. There is evidence of degenerative changes of the hip joint as well as chronic partial tear of the right proximal hamstring.     ASSESSMENT/PLAN:     A/P: Joyce Peterson is a 62 y.o. with PMHx of L intertrochanteric femur fracture and subsequent IMN in 5/2019 now presenting with RIGHT hip pain. Concern for RIGHT hip fracture.    Plan:  - We have discussed a variety of treatment options including medications, injections, physical therapy and other alternative treatments. I also explained that after reviewing her CT I am not fully convinced that she does not have a fracture of her hip at this time. Given the patient's hx and examination, we should proceed with MRI at this time. Pt agrees with treatment plan.    I made the decision to obtain old records of the patient including previous notes and imaging. I independently reviewed and interpreted lab results today as well as prior imaging.     1. MRI right hip to assess for possible occult hip fracture. She is currently on vacation in [facility]. We will schedule for MRI on the day she returns.  2. I instructed her on weightbearing as tolerated to the right lower extremity at this time. She says she has a wheelchair with her there.  3. Naproxen daily PRN for pain management.  4. Will follow up after MRI.    All of the patient's questions were answered and the patient will contact us if they have any questions or concerns in the interim.

## 2023-04-20 ENCOUNTER — PATIENT MESSAGE (OUTPATIENT)
Dept: ORTHOPEDICS | Facility: CLINIC | Age: 63
End: 2023-04-20
Payer: MEDICARE

## 2023-04-25 ENCOUNTER — PATIENT MESSAGE (OUTPATIENT)
Dept: GASTROENTEROLOGY | Facility: CLINIC | Age: 63
End: 2023-04-25
Payer: MEDICARE

## 2023-04-25 ENCOUNTER — HOSPITAL ENCOUNTER (OUTPATIENT)
Dept: RADIOLOGY | Facility: HOSPITAL | Age: 63
Discharge: HOME | End: 2023-04-25
Attending: PHYSICIAN ASSISTANT
Payer: MEDICARE

## 2023-04-25 DIAGNOSIS — M25.551 ACUTE RIGHT HIP PAIN: ICD-10-CM

## 2023-04-25 PROCEDURE — 73721 MRI HIP WITHOUT CONTRAST RIGHT: ICD-10-PCS | Mod: 26,RT,, | Performed by: INTERNAL MEDICINE

## 2023-04-25 PROCEDURE — 73721 MRI JNT OF LWR EXTRE W/O DYE: CPT | Mod: TC,RT

## 2023-04-25 PROCEDURE — 73721 MRI JNT OF LWR EXTRE W/O DYE: CPT | Mod: 26,RT,, | Performed by: INTERNAL MEDICINE

## 2023-04-26 ENCOUNTER — PATIENT MESSAGE (OUTPATIENT)
Dept: SPORTS MEDICINE | Facility: CLINIC | Age: 63
End: 2023-04-26
Payer: MEDICARE

## 2023-04-26 ENCOUNTER — TELEPHONE (OUTPATIENT)
Dept: ORTHOPEDICS | Facility: CLINIC | Age: 63
End: 2023-04-26
Payer: MEDICARE

## 2023-04-26 ENCOUNTER — TELEPHONE (OUTPATIENT)
Dept: SPORTS MEDICINE | Facility: CLINIC | Age: 63
End: 2023-04-26
Payer: MEDICARE

## 2023-04-26 DIAGNOSIS — M50.30 DDD (DEGENERATIVE DISC DISEASE), CERVICAL: Primary | ICD-10-CM

## 2023-04-26 NOTE — TELEPHONE ENCOUNTER
Spoke to patient in regards to scheduling x-ray for 10:00 am at the [facility] on 05/01/2023. Patient agreed. Stated thank you. Thanks.

## 2023-04-26 NOTE — TELEPHONE ENCOUNTER
Called patient to discuss results of the MRI of her Right hip.     Results demonstrate:  NO FRACTURE.  Mild right hip degenerative changes.  Anterosuperior labral tear.  Partial tears of the gluteus minimus and medius tendons, with greater trochanteric bursitis.  Subtotal tear of the proximal hamstring tendon.  Signs of ischiofemoral impingement.    We spoke more about her pain as she has multiple findings on her MRI. It sounds like the majority of her pain is intraarticular. She does also endorse some lower back pain. Says that pain in her buttock is now less of an issue and she does not have much pain laterally. Biggest concerns are the anterior hip pain and lower back pain. She is interested in seeing someone with back and spine. Will refer to be evaluated. Additionally it seems like her hip pain is intraarticular. I will refer her to our Primary [facility] Sports Medicine doctors for R hip intraarticular CSI.

## 2023-04-26 NOTE — TELEPHONE ENCOUNTER
Spoke to patient, scheduled for next week. Patient agreeable     ----- Message from Kassi Moura PA-C sent at 4/26/2023 10:56 AM CDT -----  Hey can you get her scheduled for a new patient visit next week if shes available? Im only in clinic Monday but looks like I have two spots. Low back pain. Thank you!  ----- Message -----  From: Steph Tenorio PA-C  Sent: 4/26/2023  10:54 AM CDT  To: Kassi Moura PA-C    Hey this is the patient I spoke with you about. Lower back. Last films in 2021 show L1 spondy.

## 2023-04-27 ENCOUNTER — PATIENT MESSAGE (OUTPATIENT)
Dept: GASTROENTEROLOGY | Facility: CLINIC | Age: 63
End: 2023-04-27
Payer: MEDICARE

## 2023-04-27 ENCOUNTER — PATIENT MESSAGE (OUTPATIENT)
Dept: ORTHOPEDICS | Facility: CLINIC | Age: 63
End: 2023-04-27
Payer: MEDICARE

## 2023-04-28 ENCOUNTER — OFFICE VISIT (OUTPATIENT)
Dept: FAMILY MEDICINE | Facility: CLINIC | Age: 63
End: 2023-04-28
Payer: MEDICARE

## 2023-04-28 ENCOUNTER — TELEPHONE (OUTPATIENT)
Dept: PODIATRY | Facility: CLINIC | Age: 63
End: 2023-04-28
Payer: MEDICARE

## 2023-04-28 ENCOUNTER — PATIENT MESSAGE (OUTPATIENT)
Dept: ORTHOPEDICS | Facility: CLINIC | Age: 63
End: 2023-04-28
Payer: MEDICARE

## 2023-04-28 VITALS
TEMPERATURE: 99 F | HEIGHT: 61 IN | SYSTOLIC BLOOD PRESSURE: 122 MMHG | BODY MASS INDEX: 26.66 KG/M2 | WEIGHT: 141.19 LBS | DIASTOLIC BLOOD PRESSURE: 64 MMHG | OXYGEN SATURATION: 96 % | HEART RATE: 99 BPM

## 2023-04-28 DIAGNOSIS — L97.511 ULCER OF RIGHT FOOT, LIMITED TO BREAKDOWN OF SKIN: Primary | ICD-10-CM

## 2023-04-28 DIAGNOSIS — M05.79 RHEUMATOID ARTHRITIS INVOLVING MULTIPLE SITES WITH POSITIVE RHEUMATOID FACTOR: Chronic | ICD-10-CM

## 2023-04-28 DIAGNOSIS — J01.40 SUBACUTE PANSINUSITIS: ICD-10-CM

## 2023-04-28 PROBLEM — M25.60 DECREASED RANGE OF MOTION WITH DECREASED STRENGTH: Status: RESOLVED | Noted: 2021-10-05 | Resolved: 2023-04-28

## 2023-04-28 PROBLEM — M84.375A STRESS FRACTURE OF LEFT FOOT: Status: RESOLVED | Noted: 2022-06-28 | Resolved: 2023-04-28

## 2023-04-28 PROBLEM — Z86.16 HISTORY OF COVID-19: Status: RESOLVED | Noted: 2022-12-29 | Resolved: 2023-04-28

## 2023-04-28 PROBLEM — R74.8 ELEVATED SERUM GGT LEVEL: Status: RESOLVED | Noted: 2021-03-17 | Resolved: 2023-04-28

## 2023-04-28 PROBLEM — R68.89 IMPAIRED TOLERANCE OF ACTIVITY: Status: RESOLVED | Noted: 2022-07-05 | Resolved: 2023-04-28

## 2023-04-28 PROBLEM — D72.819 LEUKOPENIA: Status: RESOLVED | Noted: 2022-12-29 | Resolved: 2023-04-28

## 2023-04-28 PROBLEM — R60.9 EDEMA: Status: RESOLVED | Noted: 2022-12-29 | Resolved: 2023-04-28

## 2023-04-28 PROBLEM — R53.1 DECREASED STRENGTH, ENDURANCE, AND MOBILITY: Status: RESOLVED | Noted: 2022-07-05 | Resolved: 2023-04-28

## 2023-04-28 PROBLEM — M19.079 ARTHRITIS OF FOOT: Status: RESOLVED | Noted: 2022-06-28 | Resolved: 2023-04-28

## 2023-04-28 PROBLEM — R53.1 DECREASED RANGE OF MOTION WITH DECREASED STRENGTH: Status: RESOLVED | Noted: 2021-10-05 | Resolved: 2023-04-28

## 2023-04-28 PROBLEM — M54.50 LOW BACK PAIN: Status: RESOLVED | Noted: 2017-02-07 | Resolved: 2023-04-28

## 2023-04-28 PROBLEM — Z74.09 DECREASED STRENGTH, ENDURANCE, AND MOBILITY: Status: RESOLVED | Noted: 2022-07-05 | Resolved: 2023-04-28

## 2023-04-28 PROBLEM — Z79.52 CURRENT CHRONIC USE OF SYSTEMIC STEROIDS: Status: RESOLVED | Noted: 2022-12-29 | Resolved: 2023-04-28

## 2023-04-28 PROBLEM — M19.072: Status: RESOLVED | Noted: 2022-06-28 | Resolved: 2023-04-28

## 2023-04-28 PROBLEM — M21.42 PES PLANOVALGUS, ACQUIRED, LEFT: Status: RESOLVED | Noted: 2022-06-28 | Resolved: 2023-04-28

## 2023-04-28 PROBLEM — R68.89 DECREASED STRENGTH, ENDURANCE, AND MOBILITY: Status: RESOLVED | Noted: 2022-07-05 | Resolved: 2023-04-28

## 2023-04-28 PROBLEM — Z00.6 RESEARCH STUDY PATIENT: Status: RESOLVED | Noted: 2018-04-30 | Resolved: 2023-04-28

## 2023-04-28 PROBLEM — R74.01 TRANSAMINASEMIA: Status: RESOLVED | Noted: 2021-03-17 | Resolved: 2023-04-28

## 2023-04-28 PROCEDURE — 3078F DIAST BP <80 MM HG: CPT | Mod: CPTII,S$GLB,, | Performed by: STUDENT IN AN ORGANIZED HEALTH CARE EDUCATION/TRAINING PROGRAM

## 2023-04-28 PROCEDURE — 3078F PR MOST RECENT DIASTOLIC BLOOD PRESSURE < 80 MM HG: ICD-10-PCS | Mod: CPTII,S$GLB,, | Performed by: STUDENT IN AN ORGANIZED HEALTH CARE EDUCATION/TRAINING PROGRAM

## 2023-04-28 PROCEDURE — 99214 OFFICE O/P EST MOD 30 MIN: CPT | Mod: S$GLB,,, | Performed by: STUDENT IN AN ORGANIZED HEALTH CARE EDUCATION/TRAINING PROGRAM

## 2023-04-28 PROCEDURE — 1159F PR MEDICATION LIST DOCUMENTED IN MEDICAL RECORD: ICD-10-PCS | Mod: CPTII,S$GLB,, | Performed by: STUDENT IN AN ORGANIZED HEALTH CARE EDUCATION/TRAINING PROGRAM

## 2023-04-28 PROCEDURE — 99999 PR PBB SHADOW E&M-EST. PATIENT-LVL V: ICD-10-PCS | Mod: PBBFAC,,, | Performed by: STUDENT IN AN ORGANIZED HEALTH CARE EDUCATION/TRAINING PROGRAM

## 2023-04-28 PROCEDURE — 99214 PR OFFICE/OUTPT VISIT, EST, LEVL IV, 30-39 MIN: ICD-10-PCS | Mod: S$GLB,,, | Performed by: STUDENT IN AN ORGANIZED HEALTH CARE EDUCATION/TRAINING PROGRAM

## 2023-04-28 PROCEDURE — 3008F PR BODY MASS INDEX (BMI) DOCUMENTED: ICD-10-PCS | Mod: CPTII,S$GLB,, | Performed by: STUDENT IN AN ORGANIZED HEALTH CARE EDUCATION/TRAINING PROGRAM

## 2023-04-28 PROCEDURE — 3008F BODY MASS INDEX DOCD: CPT | Mod: CPTII,S$GLB,, | Performed by: STUDENT IN AN ORGANIZED HEALTH CARE EDUCATION/TRAINING PROGRAM

## 2023-04-28 PROCEDURE — 3074F PR MOST RECENT SYSTOLIC BLOOD PRESSURE < 130 MM HG: ICD-10-PCS | Mod: CPTII,S$GLB,, | Performed by: STUDENT IN AN ORGANIZED HEALTH CARE EDUCATION/TRAINING PROGRAM

## 2023-04-28 PROCEDURE — 1160F PR REVIEW ALL MEDS BY PRESCRIBER/CLIN PHARMACIST DOCUMENTED: ICD-10-PCS | Mod: CPTII,S$GLB,, | Performed by: STUDENT IN AN ORGANIZED HEALTH CARE EDUCATION/TRAINING PROGRAM

## 2023-04-28 PROCEDURE — 99999 PR PBB SHADOW E&M-EST. PATIENT-LVL V: CPT | Mod: PBBFAC,,, | Performed by: STUDENT IN AN ORGANIZED HEALTH CARE EDUCATION/TRAINING PROGRAM

## 2023-04-28 PROCEDURE — 1159F MED LIST DOCD IN RCRD: CPT | Mod: CPTII,S$GLB,, | Performed by: STUDENT IN AN ORGANIZED HEALTH CARE EDUCATION/TRAINING PROGRAM

## 2023-04-28 PROCEDURE — 3074F SYST BP LT 130 MM HG: CPT | Mod: CPTII,S$GLB,, | Performed by: STUDENT IN AN ORGANIZED HEALTH CARE EDUCATION/TRAINING PROGRAM

## 2023-04-28 PROCEDURE — 1160F RVW MEDS BY RX/DR IN RCRD: CPT | Mod: CPTII,S$GLB,, | Performed by: STUDENT IN AN ORGANIZED HEALTH CARE EDUCATION/TRAINING PROGRAM

## 2023-04-28 RX ORDER — DOXYCYCLINE HYCLATE 100 MG
100 TABLET ORAL 2 TIMES DAILY
Qty: 10 TABLET | Refills: 0 | Status: SHIPPED | OUTPATIENT
Start: 2023-04-28 | End: 2023-05-03

## 2023-04-28 NOTE — TELEPHONE ENCOUNTER
Called patient and she would like to see Dr. Mark in the [place] office.   I made her the next available appt for May 9th.  I asked her if she would like to be put on the wait list,  and she stated yes.  After making the patient a scheduled appt I also put her on the wait list.    Vielka Singleton, DO [facility]; Isiah Mark Jr., DPM  PT with toes ulcer 2/2 deformity from large bunion; + RA chronic immunosuppression; can you get her in next week for this? TIA

## 2023-04-28 NOTE — Clinical Note
PT with toes ulcer 2/2 deformity from large bunion; + RA chronic immunosuppression; can you get her in next week for this? TIA

## 2023-04-28 NOTE — PROGRESS NOTES
Subjective:      Patient ID: Joyce Peterson is a 62 y.o. female.    Chief Complaint: Sinus Problem (Congestion/ wheezing/ sinus trouble for the past week/ headaches) and Blister (Blister on right toe/ open sore/ right foot 2nd to last toe/ pain and redness/ draining)    - sinsus headache x 3 weeks  - antihistamine; Astelin; not helping  - chronic steroid use  - also notes toes ulcer; toes are being pushed to side 2/2 bunion 2/2 RA and this is causing toes to rub and has formed ulcer; tender; tries to add barrier but not really helping that much; has ortho foot but no podiatry open to seeing podiatry here     Sinus Problem  This is a chronic problem. The current episode started 1 to 4 weeks ago. The problem has been waxing and waning since onset. There has been no fever. Associated symptoms include congestion, coughing, ear pain, headaches, shortness of breath, sinus pressure and sneezing. Past treatments include oral decongestants, saline nose sprays, nasal decongestants and acetaminophen. The treatment provided mild relief.   Review of Systems   Constitutional:  Positive for fatigue.   HENT:  Positive for congestion, ear pain, postnasal drip, sinus pressure, sinus pain and sneezing.    Respiratory:  Positive for cough and shortness of breath.    Musculoskeletal:  Positive for arthralgias, gait problem, joint swelling and myalgias.   Skin:  Positive for wound.   Allergic/Immunologic: Positive for environmental allergies and immunocompromised state.   Neurological:  Positive for headaches.      Objective:     Vitals:    04/28/23 1309   BP: 122/64   Pulse: 99   Temp: 98.6 °F (37 °C)      Physical Exam  Constitutional:       Appearance: Normal appearance.   HENT:      Head: Normocephalic and atraumatic.      Right Ear: A middle ear effusion is present.      Left Ear: A middle ear effusion is present.   Eyes:      Conjunctiva/sclera: Conjunctivae normal.   Cardiovascular:      Rate and Rhythm: Normal rate and  regular rhythm.   Pulmonary:      Effort: Pulmonary effort is normal.      Breath sounds: Normal breath sounds.   Musculoskeletal:      Right foot: Decreased range of motion. Deformity and bunion present.   Feet:      Right foot:      Skin integrity: Ulcer, blister, skin breakdown and erythema present.      Toenail Condition: Right toenails are long.   Neurological:      General: No focal deficit present.      Mental Status: She is alert and oriented to person, place, and time.   Psychiatric:         Mood and Affect: Mood normal.         Behavior: Behavior normal.     Assessment:         1. Ulcer of right foot, limited to breakdown of skin    2. Subacute pansinusitis    3. Rheumatoid arthritis involving multiple sites with positive rheumatoid factor          Plan:   1. Ulcer of right foot, limited to breakdown of skin  - Ambulatory referral/consult to Podiatry; Future    2. Subacute pansinusitis  - doxycycline (VIBRA-TABS) 100 MG tablet; Take 1 tablet (100 mg total) by mouth 2 (two) times daily. for 5 days  Dispense: 10 tablet; Refill: 0    3. Rheumatoid arthritis involving multiple sites with positive rheumatoid factor  - Ambulatory referral/consult to Podiatry; Future     Refer to podiatry for toe ulcer; discussed barrier in meantime  Continue OTC allergy meds; antihistamine, mucinex and astelin  Start doxycyline and increase to 7.5mg prednisone x 5 days   RTC PRN         Krystal Florence[facility]   4/28/23

## 2023-05-01 ENCOUNTER — OFFICE VISIT (OUTPATIENT)
Dept: ORTHOPEDICS | Facility: CLINIC | Age: 63
End: 2023-05-01
Payer: MEDICARE

## 2023-05-01 ENCOUNTER — HOSPITAL ENCOUNTER (OUTPATIENT)
Dept: RADIOLOGY | Facility: HOSPITAL | Age: 63
Discharge: HOME | End: 2023-05-01
Attending: ORTHOPAEDIC SURGERY
Payer: MEDICARE

## 2023-05-01 ENCOUNTER — PATIENT MESSAGE (OUTPATIENT)
Dept: ORTHOPEDICS | Facility: CLINIC | Age: 63
End: 2023-05-01

## 2023-05-01 VITALS — BODY MASS INDEX: 26.57 KG/M2 | WEIGHT: 140.75 LBS | HEIGHT: 61 IN

## 2023-05-01 DIAGNOSIS — K31.84 GASTROPARESIS: Primary | ICD-10-CM

## 2023-05-01 DIAGNOSIS — G89.29 CHRONIC BILATERAL LOW BACK PAIN WITH BILATERAL SCIATICA: Primary | ICD-10-CM

## 2023-05-01 DIAGNOSIS — M54.41 CHRONIC BILATERAL LOW BACK PAIN WITH BILATERAL SCIATICA: Primary | ICD-10-CM

## 2023-05-01 DIAGNOSIS — M54.42 CHRONIC BILATERAL LOW BACK PAIN WITH BILATERAL SCIATICA: Primary | ICD-10-CM

## 2023-05-01 DIAGNOSIS — M50.30 DDD (DEGENERATIVE DISC DISEASE), CERVICAL: ICD-10-CM

## 2023-05-01 PROCEDURE — 99999 PR PBB SHADOW E&M-EST. PATIENT-LVL IV: CPT | Mod: PBBFAC,,, | Performed by: ORTHOPAEDIC SURGERY

## 2023-05-01 PROCEDURE — 1159F PR MEDICATION LIST DOCUMENTED IN MEDICAL RECORD: ICD-10-PCS | Mod: CPTII,S$GLB,, | Performed by: ORTHOPAEDIC SURGERY

## 2023-05-01 PROCEDURE — 3008F PR BODY MASS INDEX (BMI) DOCUMENTED: ICD-10-PCS | Mod: CPTII,S$GLB,, | Performed by: ORTHOPAEDIC SURGERY

## 2023-05-01 PROCEDURE — 1159F MED LIST DOCD IN RCRD: CPT | Mod: CPTII,S$GLB,, | Performed by: ORTHOPAEDIC SURGERY

## 2023-05-01 PROCEDURE — 99214 OFFICE O/P EST MOD 30 MIN: CPT | Mod: S$GLB,,, | Performed by: ORTHOPAEDIC SURGERY

## 2023-05-01 PROCEDURE — 72110 X-RAY EXAM L-2 SPINE 4/>VWS: CPT | Mod: TC

## 2023-05-01 PROCEDURE — 3008F BODY MASS INDEX DOCD: CPT | Mod: CPTII,S$GLB,, | Performed by: ORTHOPAEDIC SURGERY

## 2023-05-01 PROCEDURE — 72110 X-RAY EXAM L-2 SPINE 4/>VWS: CPT | Mod: 26,,, | Performed by: RADIOLOGY

## 2023-05-01 PROCEDURE — 99999 PR PBB SHADOW E&M-EST. PATIENT-LVL IV: ICD-10-PCS | Mod: PBBFAC,,, | Performed by: ORTHOPAEDIC SURGERY

## 2023-05-01 PROCEDURE — 99214 PR OFFICE/OUTPT VISIT, EST, LEVL IV, 30-39 MIN: ICD-10-PCS | Mod: S$GLB,,, | Performed by: ORTHOPAEDIC SURGERY

## 2023-05-01 PROCEDURE — 72110 XR LUMBAR SPINE AP AND LAT WITH FLEX/EXT: ICD-10-PCS | Mod: 26,,, | Performed by: RADIOLOGY

## 2023-05-01 RX ORDER — FLUCONAZOLE 150 MG/1
150 TABLET ORAL
COMMUNITY
Start: 2022-11-21

## 2023-05-01 RX ORDER — METHOCARBAMOL 750 MG/1
750 TABLET, FILM COATED ORAL 3 TIMES DAILY
Qty: 90 TABLET | Refills: 0 | Status: SHIPPED | OUTPATIENT
Start: 2023-05-01 | End: 2023-05-24 | Stop reason: SDUPTHER

## 2023-05-01 NOTE — PROGRESS NOTES
DATE: 5/1/2023  PATIENT: Joyce Peterson    Supervising Physician: Cristiano Miller M.D.    CHIEF COMPLAINT: low back and bilateral leg pain    HISTORY:  Joyce Peterson is a 62 y.o. female with a pmhx of sjogrens and RA with chronic steroid use (on prolia, hx of multiple fx) here for initial evaluation of low back and bilateral leg pain (Back - 7, Leg - 7).  The pain in the lower back and behind both legs is what bothers her most.  The pain has been present for years, worsening over time. The patient describes the pain as aching and radiating down the back of both legs.  The pain is worse with walking and laying and improved by leaning on the shopping cart. There is positive associated numbness and tingling. There is negative subjective weakness. Prior treatments have included healthy back program, medications, and multiple pain management procedures, but no surgery.    The patient denies myelopathic symptoms such as handwriting changes or difficulty with buttons/coins/keys. Denies perineal paresthesias, bowel/bladder dysfunction.    Pain Procedures:   3/12/20 Bilateral L4/5 and L5/S1 facet injection- significant benefit of back pain  7/30/20 Right hip injection- no relief   8/31/20 Right L3/4 and L4/5 TF MADELINE- 60% relief of leg pain  3/29/21 Bilateral L3/4, L4/5 and L5/S1 facet injections  7/21/21 Bilateral L4/5 TF MADELINE- limited benefit  8/23/21 Bilateral L3,4,5 MBB- significant short term benefit  8/26/21 Bilateral L3,4,5 MBB- significant short term benefit  9/20/21 Left L3,4,5 RFA- 80% relief  10/4/21 Right L3,4,5 RFA- 80% relief  4/12/22 Left L3,4,5 RFA- 80% relief  5/12/22 Right L3,4,5 RFA- 80% relief  7/25/22 Left Femoral and Left obturator Coolief RFA  9/29/22 L3-4 MADELINE  12/12/22 L5-S1 MADELINE  4/6/23 L5-S1 MADELINE    PAST MEDICAL/SURGICAL HISTORY:  Past Medical History:   Diagnosis Date    Acid reflux     Allergy     Anemia     Asthma     Coronary artery disease     CS (cervical spondylosis) 03/08/2013     Degenerative disc disease     Degenerative joint disease of hindfoot, left 6/28/2022    Dry eyes     Dry mouth     Gastroparesis     History of methotrexate therapy 01/19/2022    Hyperlipidemia     Lateral meniscus derangement 04/06/2016    Lobular carcinoma in situ     Lumbar spondylosis 03/08/2013    Osteoarthritis     Osteoporosis     Pes planovalgus, acquired, left 6/28/2022    Rheumatoid arthritis(714.0)     Rupture of left triceps tendon 10/17/2018    Umbilical hernia 08/13/2015     Past Surgical History:   Procedure Laterality Date    BREAST BIOPSY Left 01/29/2002    core bx    CARPAL TUNNEL RELEASE Right 05/2017    CHOLECYSTECTOMY  2004    COLONOSCOPY      10/11    COLONOSCOPY N/A 6/29/2017    Procedure: COLONOSCOPY;  Surgeon: López Moore MD;  Location: [facility] ENDO ([address] [place] [address]);  Service: Endoscopy;  Laterality: N/A;    EPIDURAL STEROID INJECTION N/A 11/18/2021    Procedure: INJECTION, STEROID, EPIDURAL, L5-S1 IL NEED CONSENT;  Surgeon: Tj Mayfield MD;  Location: [facility] PAIN MGT;  Service: Pain Management;  Laterality: N/A;    EPIDURAL STEROID INJECTION N/A 9/29/2022    Procedure: LUMBAR L3/L4 IL MADELINE CONTRAST;  Surgeon: Tj Mayfield MD;  Location: [facility] PAIN MGT;  Service: Pain Management;  Laterality: N/A;    EPIDURAL STEROID INJECTION N/A 12/12/2022    Procedure: INJECTION, STEROID, EPIDURAL L5/S1 IL CONTRAST;  Surgeon: Tj Mayfield MD;  Location: [facility] PAIN MGT;  Service: Pain Management;  Laterality: N/A;    EPIDURAL STEROID INJECTION N/A 4/6/2023    Procedure: INJECTION, STEROID, EPIDURAL L5/S1;  Surgeon: Tj Mayfield MD;  Location: [facility] PAIN MGT;  Service: Pain Management;  Laterality: N/A;    FOOT ARTHRODESIS Left 1/11/2023    Procedure: FUSION, FOOT;  Surgeon: Ariella Finnegan MD;  Location: [facility] OR;  Service: Orthopedics;  Laterality: Left;  nimbus    HARVESTING OF BONE GRAFT Left 1/11/2023    Procedure: SURGICAL PROCUREMENT, BONE GRAFT;  Surgeon: Ariella Finnegan MD;  Location: [facility] OR;  Service:  Orthopedics;  Laterality: Left;    INJECTION OF ANESTHETIC AGENT AROUND NERVE Bilateral 8/23/2021    Procedure: BLOCK, NERVE, MEDIAL BRANCH L3,L4,L5;  Surgeon: Tj Mayfield MD;  Location: [facility] PAIN MGT;  Service: Pain Management;  Laterality: Bilateral;  1 of 2    INJECTION OF ANESTHETIC AGENT AROUND NERVE Bilateral 8/26/2021    Procedure: BLOCK, NERVE, MEDIAL BRANCH L3,L4,L5;  Surgeon: Tj Mayfield MD;  Location: [facility] PAIN MGT;  Service: Pain Management;  Laterality: Bilateral;  2 of 2    INJECTION OF ANESTHETIC AGENT AROUND NERVE Left 7/7/2022    Procedure: BLOCK, NERVE, LEFT OBTURATOR AND FEMORAL;  Surgeon: Tj Mayfield MD;  Location: [facility] PAIN MGT;  Service: Pain Management;  Laterality: Left;    INJECTION OF FACET JOINT Bilateral 3/12/2020    Procedure: FACET JOINT INJECTION (LUMBAR BLOCK) BILATERAL L4-5 AND L5-S1 DIRECT REFERRAL;  Surgeon: Tj Mayfield MD;  Location: [facility] PAIN MGT;  Service: Pain Management;  Laterality: Bilateral;  NEEDS CONSENT    INJECTION OF FACET JOINT Bilateral 3/29/2021    Procedure: INJECTION, FACET JOINT L3/4, L4/5, L5/S1;  Surgeon: Tj Mayfield MD;  Location: [facility] PAIN MGT;  Service: Pain Management;  Laterality: Bilateral;    INJECTION OF JOINT Right 7/30/2020    Procedure: INJECTION, JOINT, RIGHT HIP Interarticular under flouro;  Surgeon: Tj Mayfield MD;  Location: [facility] PAIN MGT;  Service: Pain Management;  Laterality: Right;  INJECTION, JOINT, RIGHT HIP Interarticular under flouro    INJECTION OF JOINT Right 2/21/2022    Procedure: Injection, Joint RIGHT ISCHIAL BURSA;  Surgeon: Tj Mayfield MD;  Location: [facility] PAIN MGT;  Service: Pain Management;  Laterality: Right;    INTRAMEDULLARY RODDING OF FEMUR Left 5/21/2019    Procedure: INSERTION, INTRAMEDULLARY ARIEL, FEMUR;  Surgeon: López Duff MD;  Location: [facility] OR [address] [place] [address];  Service: Orthopedics;  Laterality: Left;    LENGTHENING OF TENDON OF LOWER EXTREMITY Left 1/11/2023    Procedure: LENGTHENING, TENDON, LOWER EXTREMITY;   Surgeon: Ariella Finnegan MD;  Location: [facility] OR;  Service: Orthopedics;  Laterality: Left;    RADIOFREQUENCY ABLATION Left 9/20/2021    Procedure: RADIOFREQUENCY ABLATION left L3,4,5 RFA  1 of 2  CONSENT NEEDED;  Surgeon: Tj Mayfield MD;  Location: [facility] PAIN MGT;  Service: Pain Management;  Laterality: Left;    RADIOFREQUENCY ABLATION Right 10/4/2021    Procedure: RADIOFREQUENCY ABLATION  right L3,4,5 RFA   2 of 2  CONSENT NEEDED;  Surgeon: Tj Mayfield MD;  Location: [facility] PAIN MGT;  Service: Pain Management;  Laterality: Right;    RADIOFREQUENCY ABLATION Left 4/21/2022    Procedure: Radiofrequency Ablation LEFT L3,L4,L5;  Surgeon: Tj Mayfield MD;  Location: [facility] PAIN MGT;  Service: Pain Management;  Laterality: Left;    RADIOFREQUENCY ABLATION Right 5/12/2022    Procedure: Radiofrequency Ablation RIGHT L3,L4,L5;  Surgeon: Tj Mayfield MD;  Location: [facility] PAIN MGT;  Service: Pain Management;  Laterality: Right;    RADIOFREQUENCY ABLATION Left 7/25/2022    Procedure: Radiofrequency Ablation Left Femoral & Obturator;  Surgeon: Tj Mayfield MD;  Location: [facility] PAIN MGT;  Service: Pain Management;  Laterality: Left;    REPAIR OF TRICEPS TENDON Left 10/17/2018    Procedure: REPAIR, TENDON, TRICEPS left elbow;  Surgeon: Staci Yarbrough MD;  Location: [facility] OR [address] [place] [address];  Service: Orthopedics;  Laterality: Left;  Anesthesia: General and regional. PRONE, k-wire , hand pan 1 and pan 2, CALL ARTHREX/Tamera notified 10-12 LO    TONSILLECTOMY      TRANSFORAMINAL EPIDURAL INJECTION OF STEROID Right 8/31/2020    Procedure: INJECTION, STEROID, EPIDURAL, TRANSFORAMINAL,  APPROACH, L3-L4 and L4-L5 need consent;  Surgeon: Tj Mayfield MD;  Location: [facility] PAIN [facility]GT;  Service: Pain Management;  Laterality: Right;    TRANSFORAMINAL EPIDURAL INJECTION OF STEROID Bilateral 7/23/2021    Procedure: INJECTION, STEROID, EPIDURAL, TRANSFORAMINAL APPROACH L4/5;  Surgeon: Tj Mayfield MD;  Location: [facility] PAIN MGT;  Service: Pain  Management;  Laterality: Bilateral;    TRIGGER POINT INJECTION N/A 7/23/2021    Procedure: INJECTION, TRIGGER POINT SCAPULAR;  Surgeon: Tj Mayfield MD;  Location: [facility];  Service: Pain Management;  Laterality: N/A;    TUBAL LIGATION  2003    UPPER GASTROINTESTINAL ENDOSCOPY      10/11    uterine ablation  2003       Medications:   Current Outpatient Medications on File Prior to Visit   Medication Sig Dispense Refill    albuterol (PROVENTIL/VENTOLIN HFA) 90 mcg/actuation inhaler INHALE 2 PUFFS INTO THE LUNGS EVERY 6 (SIX) HOURS AS NEEDED. RESCUE 20.1 g 11    albuterol-ipratropium (DUO-NEB) 2.5 mg-0.5 mg/3 mL nebulizer solution Take 3 mLs by nebulization every 6 (six) hours as needed for Wheezing. Rescue 90 mL 3    ALPRAZolam (XANAX) 0.5 MG tablet Take Once on call to procedure 1 tablet 0    aspirin 325 MG tablet Take 1 tablet (325 mg total) by mouth once daily. 30 tablet 0    budesonide-formoterol 160-4.5 mcg (SYMBICORT) 160-4.5 mcg/actuation HFAA INHALE 2 PUFFS INTO THE LUNGS EVERY 12 (TWELVE) HOURS. 30.6 g 3    calcium citrate-vitamin D3 315-200 mg (CITRACAL+D) 315 mg-5 mcg (200 unit) per tablet Take 1 tablet by mouth 2 (two) times daily.       cycloSPORINE (RESTASIS) 0.05 % ophthalmic emulsion Instill one drop into both eyes two times per day 180 each 3    diazePAM (VALIUM) 2 MG tablet Take one tablet by mouth 30 minutes prior to MRI. Ok to take second tablet right before MRI if you continue to feel anxious. 2 tablet 0    diclofenac sodium (VOLTAREN) 1 % Gel APPLY 2 GRAMS TOPICALLY 4 (FOUR) TIMES DAILY AS NEEDED. 300 g 2    doxycycline (VIBRA-TABS) 100 MG tablet Take 1 tablet (100 mg total) by mouth 2 (two) times daily. for 5 days 10 tablet 0    estrogens, conjugated, (PREMARIN) 0.625 MG tablet take 1 tablet by mouth daily 90 tablet 4    fluconazole (DIFLUCAN) 100 MG tablet Take 1 tablet by mouth once daily for 14 days. 14 tablet 0    halobetasol propion-tazarotene (DUOBRII) 0.01-0.045 % Lotn aaa on feet  and hands qhs  then vaseline and warm towel (Patient taking differently: aaa on feet and hands qhs  then vaseline and warm towel) 100 g 3    INV PROPULSID 10 MG Take 2 tablets (20 mg) by mouth 4 (four) times daily. FOR INVESTIGATIONAL USE ONLY. Protocol [id number]  Subject ID: [id number]. 1440 each 0    ketoconazole (NIZORAL) 2 % cream Apply to feet twice daily for 3 weeks (Patient taking differently: Apply to feet twice daily for 3 weeks) 60 g 3    leflunomide (ARAVA) 20 MG Tab Take 1 tablet (20 mg total) by mouth once daily. 90 tablet 0    lifitegrast (XIIDRA) 5 % Dpet INSTILL ONE DROP INTO BOTH EYES TWICE A DAY 60 mL 10    magic mouthwash diphen/antac/lidoc Swish and Spit 5 mL by mouth for 30 seconds twice daily. 150 mL 0    methenamine (HIPREX) 1 gram Tab Take 1 tablet (1 g total) by mouth 2 (two) times daily. 180 tablet 3    mirabegron (MYRBETRIQ) 25 mg Tb24 ER tablet Take 1 tablet (25 mg total) by mouth once daily. 90 tablet 0    montelukast (SINGULAIR) 10 mg tablet TAKE 1 TABLET BY MOUTH EVERY DAY AT NIGHT 90 tablet 3    naproxen (NAPROSYN) 500 MG tablet Take 1 tablet (500 mg total) by mouth 2 (two) times daily as needed (prn). 180 tablet 1    omeprazole (PRILOSEC) 40 MG capsule Take 1 capsule (40 mg total) by mouth every morning. 30 capsule 6    omeprazole-sodium bicarbonate (ZEGERID) 40-1.1 mg-gram per capsule TAKE 1 CAPSULE BY MOUTH EVERY DAY IN THE MORNING 90 capsule 3    POTASSIUM ORAL Take by mouth once daily.      predniSONE (DELTASONE) 1 MG tablet TAKE 5 TABLETS (5 MG TOTAL) BY MOUTH ONCE DAILY. 450 tablet 1    predniSONE (DELTASONE) 5 MG tablet Take 1 tablet (5 mg total) by mouth once daily. 90 tablet 1    pregabalin (LYRICA) 50 MG capsule Take 3 capsules (150 mg total) by mouth every evening. 270 capsule 1    PREMARIN vaginal cream PLACE 0.5 GRAM VAGINALLY 3 (THREE) TIMES A WEEK. 30 g 1    progesterone (PROMETRIUM) 100 MG capsule Take 1 capsule by mouth daily. 90 capsule 1    progesterone (PROMETRIUM)  100 MG capsule take 1 capsule by mouth daily 90 capsule 4    promethazine (PHENERGAN) 25 MG tablet Take 1 tablet (25 mg total) by mouth every 6 (six) hours as needed for Nausea. 30 tablet 1    promethazine (PHENERGAN) 25 MG tablet Take 1 tablet (25 mg total) by mouth every 6 (six) hours as needed for Nausea. 15 tablet 0    rosuvastatin (CRESTOR) 20 MG tablet Take 1 tablet (20 mg total) by mouth every evening. 90 tablet 3    sarilumab (KEVZARA) 200 mg/1.14 mL Syrg Inject 1.14 mL (200 mg total) into the skin every 14 (fourteen) days. 2.28 mL 2    sucralfate (CARAFATE) 1 gram tablet TAKE 1 TABLET (1 G TOTAL) BY MOUTH 4 (FOUR) TIMES DAILY. 360 tablet 2    sucralfate (CARAFATE) 1 gram tablet Take 1 tablet (1 g total) by mouth 4 (four) times daily. 360 tablet 3    traMADoL (ULTRAM) 50 mg tablet Take 1 tablet (50 mg total) by mouth every 12 (twelve) hours as needed for Pain. 60 tablet 0     No current facility-administered medications on file prior to visit.       Social History:   Social History     Socioeconomic History    Marital status:    Tobacco Use    Smoking status: Never    Smokeless tobacco: Never   Substance and Sexual Activity    Alcohol use: Yes     Comment: 2-3 times per year.    Drug use: No    Sexual activity: Yes     Partners: Male     Birth control/protection: Surgical     Social Determinants of Health     Financial Resource Strain: Low Risk     Difficulty of Paying Living Expenses: Not hard at all   Food Insecurity: Unknown    Worried About Running Out of Food in the Last Year: Patient refused    Ran Out of Food in the Last Year: Patient refused   Transportation Needs: Unknown    Lack of Transportation (Medical): Patient refused    Lack of Transportation (Non-Medical): Patient refused   Physical Activity: Unknown    Days of Exercise per Week: 2 days   Stress: No Stress Concern Present    Feeling of Stress : Not at all   Social Connections: Unknown    Frequency of Communication with Friends and  Family: Twice a week    Frequency of Social Gatherings with Friends and Family: Twice a week    Active Member of Clubs or Organizations: Yes    Attends Club or Organization Meetings: Patient refused    Marital Status:    Housing Stability: Unknown    Unable to Pay for Housing in the Last Year: No    Unstable Housing in the Last Year: No       REVIEW OF SYSTEMS:  Constitution: Negative. Negative for chills, fever and night sweats.   Cardiovascular: Negative for chest pain and syncope.   Respiratory: Negative for cough and shortness of breath.   Gastrointestinal: See HPI. Negative for nausea/vomiting. Negative for abdominal pain.  Genitourinary: See HPI. Negative for discoloration or dysuria.  Skin: Negative for dry skin, itching and rash.   Hematologic/Lymphatic: Negative for bleeding problem. Does not bruise/bleed easily.   Musculoskeletal: Negative for falls and muscle weakness.   Neurological: See HPI. No seizures.   Endocrine: Negative for polydipsia, polyphagia and polyuria.   Allergic/Immunologic: Negative for hives and persistent infections.     EXAM:  LMP  (LMP Unknown)     General: The patient is a very pleasant 62 y.o. female in no apparent distress, the patient is oriented to person, place and time.  Psych: Normal mood and affect  HEENT: Vision grossly intact, hearing intact to the spoken word.  Lungs: Respirations unlabored.  Gait: Antalgic station and gait, no difficulty with toe or heel walk.   Skin: Dorsal lumbar skin negative for rashes, lesions, hairy patches and surgical scars. There is mild lumbar tenderness to palpation.  Range of motion: Lumbar range of motion is acceptable.  Spinal Balance: Global saggital and coronal spinal balance acceptable, not significant for scoliosis and kyphosis.  Musculoskeletal: No pain with the range of motion of the bilateral hips. No trochanteric tenderness to palpation.  Vascular: Bilateral lower extremities warm and well perfused, dorsalis pedis pulses 2+  bilaterally.  Neurological: Normal strength and tone in all major motor groups in the bilateral lower extremities. Normal sensation to light touch in the L2-S1 dermatomes bilaterally.  Deep tendon reflexes symmetric 2+ in the bilateral lower extremities.  Negative Babinski bilaterally. Straight leg raise negative bilaterally.    IMAGING:      Today I personally reviewed AP, Lat and Flex/Ex  upright L-spine films that demonstrate severe degenerative changes at L4-5 L5-S1 with grade I anterolisthesis of L4 on L5.     MRI lumbar (2020) demonstrates multilevel degenerative changes of the lumbar spine, most significant at the level of L4-L5 where there is anterolisthesis and a disc extrusion contributing to severe spinal canal stenosis and moderate left and mild right neural foraminal narrowing.     There is no height or weight on file to calculate BMI.    Hemoglobin A1C   Date Value Ref Range Status   03/15/2022 5.2 4.0 - 5.6 % Final     Comment:     ADA Screening Guidelines:  5.7-6.4%  Consistent with prediabetes  >or=6.5%  Consistent with diabetes    High levels of fetal hemoglobin interfere with the HbA1C  assay. Heterozygous hemoglobin variants (HbS, HgC, etc)do  not significantly interfere with this assay.   However, presence of multiple variants may affect accuracy.     02/06/2017 6.0 4.5 - 6.2 % Final     Comment:     According to ADA guidelines, hemoglobin A1C <7.0% represents  optimal control in non-pregnant diabetic patients.  Different  metrics may apply to specific populations.   Standards of Medical Care in Diabetes - 2016.  For the purpose of screening for the presence of diabetes:  <5.7%     Consistent with the absence of diabetes  5.7-6.4%  Consistent with increasing risk for diabetes   (prediabetes)  >or=6.5%  Consistent with diabetes  Currently no consensus exists for use of hemoglobin A1C  for diagnosis of diabetes for children.     09/28/2015 5.6 4.5 - 6.2 % Final           ASSESSMENT/PLAN:    There  are no diagnoses linked to this encounter.    Today we discussed at length all of the different treatment options including anti-inflammatories, acetaminophen, rest, ice, heat, physical therapy including strengthening and stretching exercises, home exercises, ROM, aerobic conditioning, aqua therapy, other modalities including ultrasound, massage, and dry needling, epidural steroid injections and finally surgical intervention.      Pt presents with chronic low back pain and radiculopathy. She would like to hold off on surgery for now. If we considered surgery in the future, would have to make sure bone density was sufficient (given hx of chronic steroids and multiple fx). Will send PT orders to [place]. Pt will fu in 2 months.

## 2023-05-02 ENCOUNTER — PATIENT MESSAGE (OUTPATIENT)
Dept: SPORTS MEDICINE | Facility: CLINIC | Age: 63
End: 2023-05-02

## 2023-05-02 ENCOUNTER — PATIENT MESSAGE (OUTPATIENT)
Dept: PULMONOLOGY | Facility: CLINIC | Age: 63
End: 2023-05-02
Payer: MEDICARE

## 2023-05-02 ENCOUNTER — OFFICE VISIT (OUTPATIENT)
Dept: SPORTS MEDICINE | Facility: CLINIC | Age: 63
End: 2023-05-02
Payer: MEDICARE

## 2023-05-02 VITALS — BODY MASS INDEX: 26.43 KG/M2 | WEIGHT: 140 LBS | HEIGHT: 61 IN

## 2023-05-02 DIAGNOSIS — G89.4 CHRONIC PAIN SYNDROME: ICD-10-CM

## 2023-05-02 DIAGNOSIS — S76.011S TEAR OF RIGHT GLUTEUS MINIMUS TENDON, SEQUELA: ICD-10-CM

## 2023-05-02 DIAGNOSIS — M47.26 OSTEOARTHRITIS OF SPINE WITH RADICULOPATHY, LUMBAR REGION: ICD-10-CM

## 2023-05-02 DIAGNOSIS — G89.29 CHRONIC RIGHT HIP PAIN: Primary | ICD-10-CM

## 2023-05-02 DIAGNOSIS — M67.80 TENDINOSIS: ICD-10-CM

## 2023-05-02 DIAGNOSIS — M16.11 PRIMARY OSTEOARTHRITIS OF RIGHT HIP: ICD-10-CM

## 2023-05-02 DIAGNOSIS — M17.11 PRIMARY OSTEOARTHRITIS OF RIGHT KNEE: ICD-10-CM

## 2023-05-02 DIAGNOSIS — S76.011S TEAR OF RIGHT GLUTEUS MEDIUS TENDON, SEQUELA: ICD-10-CM

## 2023-05-02 DIAGNOSIS — M54.16 LUMBAR RADICULOPATHY: ICD-10-CM

## 2023-05-02 DIAGNOSIS — M25.551 CHRONIC RIGHT HIP PAIN: Primary | ICD-10-CM

## 2023-05-02 PROCEDURE — 99999 PR PBB SHADOW E&M-EST. PATIENT-LVL IV: CPT | Mod: PBBFAC,,, | Performed by: FAMILY MEDICINE

## 2023-05-02 PROCEDURE — 20611 LARGE JOINT ASPIRATION/INJECTION: R HIP JOINT: ICD-10-PCS | Mod: RT,S$GLB,, | Performed by: FAMILY MEDICINE

## 2023-05-02 PROCEDURE — 1160F RVW MEDS BY RX/DR IN RCRD: CPT | Mod: CPTII,S$GLB,, | Performed by: FAMILY MEDICINE

## 2023-05-02 PROCEDURE — 1160F PR REVIEW ALL MEDS BY PRESCRIBER/CLIN PHARMACIST DOCUMENTED: ICD-10-PCS | Mod: CPTII,S$GLB,, | Performed by: FAMILY MEDICINE

## 2023-05-02 PROCEDURE — 99214 PR OFFICE/OUTPT VISIT, EST, LEVL IV, 30-39 MIN: ICD-10-PCS | Mod: 25,S$GLB,, | Performed by: FAMILY MEDICINE

## 2023-05-02 PROCEDURE — 99214 OFFICE O/P EST MOD 30 MIN: CPT | Mod: 25,S$GLB,, | Performed by: FAMILY MEDICINE

## 2023-05-02 PROCEDURE — 3008F BODY MASS INDEX DOCD: CPT | Mod: CPTII,S$GLB,, | Performed by: FAMILY MEDICINE

## 2023-05-02 PROCEDURE — 99999 PR PBB SHADOW E&M-EST. PATIENT-LVL IV: ICD-10-PCS | Mod: PBBFAC,,, | Performed by: FAMILY MEDICINE

## 2023-05-02 PROCEDURE — 20611 DRAIN/INJ JOINT/BURSA W/US: CPT | Mod: RT,S$GLB,, | Performed by: FAMILY MEDICINE

## 2023-05-02 PROCEDURE — 1159F PR MEDICATION LIST DOCUMENTED IN MEDICAL RECORD: ICD-10-PCS | Mod: CPTII,S$GLB,, | Performed by: FAMILY MEDICINE

## 2023-05-02 PROCEDURE — 1159F MED LIST DOCD IN RCRD: CPT | Mod: CPTII,S$GLB,, | Performed by: FAMILY MEDICINE

## 2023-05-02 PROCEDURE — 3008F PR BODY MASS INDEX (BMI) DOCUMENTED: ICD-10-PCS | Mod: CPTII,S$GLB,, | Performed by: FAMILY MEDICINE

## 2023-05-02 RX ORDER — TRAMADOL HYDROCHLORIDE 50 MG/1
50 TABLET ORAL EVERY 12 HOURS PRN
Qty: 60 TABLET | Refills: 0 | Status: SHIPPED | OUTPATIENT
Start: 2023-05-02 | End: 2023-05-04 | Stop reason: SDUPTHER

## 2023-05-02 RX ORDER — TRIAMCINOLONE ACETONIDE 40 MG/ML
40 INJECTION, SUSPENSION INTRA-ARTICULAR; INTRAMUSCULAR
Status: DISCONTINUED | OUTPATIENT
Start: 2023-05-02 | End: 2023-05-02 | Stop reason: HOSPADM

## 2023-05-02 RX ADMIN — TRIAMCINOLONE ACETONIDE 40 MG: 40 INJECTION, SUSPENSION INTRA-ARTICULAR; INTRAMUSCULAR at 09:05

## 2023-05-02 NOTE — PROCEDURES
"Large Joint Aspiration/Injection: R hip joint    Date/Time: 5/2/2023 9:00 AM  Performed by: Manjeet Díaz MD  Authorized by: Manjeet Díaz MD     Consent Done?:  Yes (Verbal)  Indications:  Pain  Site marked: the procedure site was marked    Timeout: prior to procedure the correct patient, procedure, and site was verified    Prep: patient was prepped and draped in usual sterile fashion    Local anesthesia used?: No      Details:  Needle Size:  20 G  Ultrasonic Guidance for needle placement?: Yes    Images are saved and documented.  Approach:  Anterior  Location:  Hip  Site:  R hip joint  Medications:  40 mg triamcinolone acetonide 40 mg/mL  Patient tolerance:  Patient tolerated the procedure well with no immediate complications     Description of ultrasound utilization for needle guidance:   Ultrasound guidance used for needle localization. Images saved and stored for documentation. The hip joint was visualized. Dynamic visualization of the 20g x 6.0" needle was continuous throughout the procedure.    Precautionary:  The patient's femoral artery, vein, and nerve were identified, marked with a skin marker, and visualized throughout the procedure, so as to avoid needle contact with those structures.   "

## 2023-05-02 NOTE — PROCEDURES
"Tendon Origin: R hip joint    Date/Time: 5/2/2023 9:00 AM  Performed by: Manjeet Díaz MD  Authorized by: Manjeet Díaz MD     Consent Done?:  Yes (Verbal)  Timeout: prior to procedure the correct patient, procedure, and site was verified    Indications:  Pain  Site marked: the procedure site was marked    Timeout: prior to procedure the correct patient, procedure, and site was verified    Location:  Hip  Site:  R hip joint  Prep: patient was prepped and draped in usual sterile fashion    Ultrasonic Guidance for Needle Placement?: Yes    Needle size:  20 G  Approach:  Posterolateral  Medications:  40 mg triamcinolone acetonide 40 mg/mL  Patient tolerance:  Patient tolerated the procedure well with no immediate complications   Gluteus medius and gluteus minimus muscles, tendon sheaths, and tendon insertions injection    Description of ultrasound utilization for needle guidance:   Ultrasound guidance used for needle localization. Images saved and stored for documentation. The gluteus medius and minimus muscles and tendons were visualized at their insertions on the greater trochanter. Dynamic visualization of the 20g x 3.5" needle was continuous throughout the procedure.  "

## 2023-05-02 NOTE — TELEPHONE ENCOUNTER
Patient requesting refill on tramadol 50 mg   Last office visit 3/8/23   shows last refill on 3/20/23  Patient does not have a pain contract on file with [facility] department  Patient last UDS none on file was not consistent with current therapy

## 2023-05-02 NOTE — PROGRESS NOTES
Joyce Peterson, a 62 y.o. female, is here for evaluation of right hip.     HISTORY OF PRESENT ILLNESS   Location: proximal thigh   Onset: chronic, insidious  Palliative:    relative rest   oral analgesics, OTC    Provocative: prolonged ambulation  Prior: none  Progression: plateau discomfort   Quality: sharp  Radiation: none  Severity: per nursing documentation  Timing: intermittent with use  Trauma: none    Review of systems (ROS):  A 10+ review of systems was performed with pertinent positives and negatives noted above in the history of present illness. Other systems were negative unless otherwise specified.    PHYSICAL EXAMINATION  General:  The patient is alert and oriented x 3.  Mood is pleasant.  Observation of ears, eyes and nose reveal no gross abnormalities.  HEENT: NCAT, sclera nonicteric  Lungs: Respirations are equal and unlabored.   Gait is coordinated. Patient can toe walk and heel walk without difficulty.    HIP/PELVIS EXAMINATION    Observation/Inspection  Gait:   Nonantalgic   Alignment:  Neutral   Scars:   None   Muscle atrophy: None   Effusion:  None   Warmth:  None   Discoloration:   None   Leg lengths:   Equal   Pelvis:    Level     Tenderness/Crepitus (T/C):      T / C  Trochanteric bursa   - / -  Piriformis    - / -  SI joint    - / -  Psoas tendon   - / -  Rectus insertion  - / -  Adductor insertion  - / -  Pubic symphysis  - / -    ROM: (* = pain)    Flexion:      120 degrees  External rotation:   40 degrees  Internal rotation with axial load:  30 degrees  Internal rotation without axial load:  40 degrees  Abduction:    45 degrees  Adduction:     20 degrees    Special Tests:  Pain w/ forced internal rotation (FADIR):  -   Pain w/ forced external rotation (SANDRA):  -   Circumduction test:     -  Stinchfield test:     -   Log roll:       -   Snapping hip (internal):    -   Sit-up pain:      -   Resisted sit-up pain:     -   Resisted sit-up with adductor contraction pain:  -   Step-down  test:     +  Trendelenburg test:     -  Bridge test      +     Extremity Neuro-vascular Examination:   Sensation:  Grossly intact to light touch all dermatomal regions.   Motor Function:  Fully intact motor function at hip, knee, foot and ankle    DTRs;  quadriceps and  achilles 2+.  No clonus and downgoing Babinski.    Vascular status:  DP and PT pulses 2+, brisk capillary refill, symmetric.    Skin:  intact, compartments soft.    Other Findings:    ASSESSMENT & PLAN  Assessment  #1 [place] Grade II osteoarthritis of hip, right   W/ tendinosis of lateral gluteal tendons  #2 [place] Grade II osteoarthritis of hip, left   S/p ORIF    No evidence of neurologic pathology  No evidence of vascular pathology    Imaging studies reviewed:  X-ray pelvis and hip, bilat 23.04  MRI hip right 23.04  CT hip right 23.04    Plan  We discussed the importance of appropriate diet, weight, and regular exercise    We discussed options including    Watchful waiting / relative rest    Physical therapy x   Injection therapy CSI iaHip r  Csi lat glut tends right   Consultation    The patient chooses As above   x = prescribed  CSI = corticosteroid injection  VSI = viscosupplement injection  PRPI = platelet rich plasma injection  ia = intra articular  R = right  L = left  B = bilateral   nfSx = surgical consultation was recommended, but patient is not interested in consultation at this time    Physical Therapy        Formal (fPT), @ [facility] facility t   Formal (fPT), @ [facility] facility        Homegoing (hgPT), per concurrent fPT recommendations t   Homegoing (hgPT), per prior fPT recommendations    Homegoing (hgPT), handout provided        w/  (atPT)    [blank] = not prescribed  x = prescribed  b = prescribed, and begin as indicated  t = continue as indicated  r = prescribed, and restart as indicated  p = completed prior as indicated  hs = prescribed, and with high school   col = prescribed, and with college or  [employer]   nfPT = physical therapy was recommended, but patient is not interested in PT at this time    Activity (e.g. sports, work) restrictions    [blank] = as tolerated  pt = per physical therapist  at = per   NWB = non weight bearing on affected lower extremity, with crutches assistance for ambulation    Bracing    [blank] = not prescribed  r = recommended, but not fit with at todays visit  f = prescribed and fit with at todays visit  t = continue as indicated  d = d/c  p = as needed  rare = use on rare, as-needed basis; advised against chronic use    Pain management    [blank] = No prescription necessary. A handout detailing dosing of appropriate   over-the-counter musculoskeletal analgesics was made available to the patient.   m = meloxicam x 14 days  mp = 14 day course of meloxicam prescribed prior    Follow up 10 days via [facility] communication    [blank] = as needed  [number] = in [number] weeks  CSI = for corticosteroid injection  VSI = for viscosupplement injection or injection series  PRP = for platelet rich plasma injection or injection series  MRI = after MRI imaging  ns = should surgical options be deferred (no surgery)  o = appointment offered, deferred by patient    Should symptoms worsen or fail to resolve, consider    Revisiting the above options and / or      Vocation:

## 2023-05-03 ENCOUNTER — PATIENT MESSAGE (OUTPATIENT)
Dept: FAMILY MEDICINE | Facility: CLINIC | Age: 63
End: 2023-05-03
Payer: MEDICARE

## 2023-05-03 DIAGNOSIS — J45.20 CHRONIC ASTHMA, MILD INTERMITTENT, UNCOMPLICATED: ICD-10-CM

## 2023-05-03 DIAGNOSIS — J30.89 PERENNIAL ALLERGIC RHINITIS: ICD-10-CM

## 2023-05-03 RX ORDER — MONTELUKAST SODIUM 10 MG/1
10 TABLET ORAL NIGHTLY
Qty: 90 TABLET | Refills: 3 | Status: SHIPPED | OUTPATIENT
Start: 2023-05-03 | End: 2023-06-13

## 2023-05-03 RX ORDER — ALBUTEROL SULFATE 90 UG/1
2 AEROSOL, METERED RESPIRATORY (INHALATION) EVERY 6 HOURS PRN
Qty: 20.1 G | Refills: 11 | Status: SHIPPED | OUTPATIENT
Start: 2023-05-03

## 2023-05-03 RX ORDER — BUDESONIDE AND FORMOTEROL FUMARATE DIHYDRATE 160; 4.5 UG/1; UG/1
2 AEROSOL RESPIRATORY (INHALATION) EVERY 12 HOURS
Qty: 30.6 G | Refills: 3 | Status: SHIPPED | OUTPATIENT
Start: 2023-05-03

## 2023-05-03 NOTE — TELEPHONE ENCOUNTER
No care due was identified.  [facility] Embedded Care Due Messages. Reference number: [id number].   5/03/2023 2:16:44 PM CDT

## 2023-05-04 ENCOUNTER — PATIENT MESSAGE (OUTPATIENT)
Dept: PAIN MEDICINE | Facility: CLINIC | Age: 63
End: 2023-05-04
Payer: MEDICARE

## 2023-05-04 DIAGNOSIS — M54.16 LUMBAR RADICULOPATHY: ICD-10-CM

## 2023-05-04 DIAGNOSIS — M47.26 OSTEOARTHRITIS OF SPINE WITH RADICULOPATHY, LUMBAR REGION: ICD-10-CM

## 2023-05-04 DIAGNOSIS — G89.4 CHRONIC PAIN SYNDROME: ICD-10-CM

## 2023-05-04 RX ORDER — TRAMADOL HYDROCHLORIDE 50 MG/1
50 TABLET ORAL EVERY 12 HOURS PRN
Qty: 60 TABLET | Refills: 0 | Status: SHIPPED | OUTPATIENT
Start: 2023-05-04 | End: 2023-05-16

## 2023-05-08 ENCOUNTER — PATIENT MESSAGE (OUTPATIENT)
Dept: PAIN MEDICINE | Facility: OTHER | Age: 63
End: 2023-05-08
Payer: MEDICARE

## 2023-05-08 ENCOUNTER — SPECIALTY PHARMACY (OUTPATIENT)
Dept: PHARMACY | Facility: CLINIC | Age: 63
End: 2023-05-08
Payer: MEDICARE

## 2023-05-08 ENCOUNTER — OFFICE VISIT (OUTPATIENT)
Dept: PAIN MEDICINE | Facility: CLINIC | Age: 63
End: 2023-05-08
Payer: MEDICARE

## 2023-05-08 VITALS
SYSTOLIC BLOOD PRESSURE: 131 MMHG | HEIGHT: 61 IN | DIASTOLIC BLOOD PRESSURE: 76 MMHG | BODY MASS INDEX: 26.6 KG/M2 | HEART RATE: 99 BPM | WEIGHT: 140.88 LBS

## 2023-05-08 DIAGNOSIS — M54.16 LUMBAR RADICULOPATHY: ICD-10-CM

## 2023-05-08 DIAGNOSIS — M16.12 OSTEOARTHRITIS OF LEFT HIP, UNSPECIFIED OSTEOARTHRITIS TYPE: ICD-10-CM

## 2023-05-08 DIAGNOSIS — M47.816 LUMBAR SPONDYLOSIS: Primary | ICD-10-CM

## 2023-05-08 DIAGNOSIS — M79.18 MYOFASCIAL PAIN: ICD-10-CM

## 2023-05-08 DIAGNOSIS — G89.4 CHRONIC PAIN SYNDROME: ICD-10-CM

## 2023-05-08 DIAGNOSIS — M47.816 SPONDYLOSIS OF LUMBAR REGION WITHOUT MYELOPATHY OR RADICULOPATHY: ICD-10-CM

## 2023-05-08 DIAGNOSIS — M06.9 RHEUMATOID ARTHRITIS, INVOLVING UNSPECIFIED SITE, UNSPECIFIED WHETHER RHEUMATOID FACTOR PRESENT: ICD-10-CM

## 2023-05-08 PROCEDURE — 3075F SYST BP GE 130 - 139MM HG: CPT | Mod: CPTII,S$GLB,, | Performed by: NURSE PRACTITIONER

## 2023-05-08 PROCEDURE — 1160F PR REVIEW ALL MEDS BY PRESCRIBER/CLIN PHARMACIST DOCUMENTED: ICD-10-PCS | Mod: CPTII,S$GLB,, | Performed by: NURSE PRACTITIONER

## 2023-05-08 PROCEDURE — 3008F PR BODY MASS INDEX (BMI) DOCUMENTED: ICD-10-PCS | Mod: CPTII,S$GLB,, | Performed by: NURSE PRACTITIONER

## 2023-05-08 PROCEDURE — 99214 PR OFFICE/OUTPT VISIT, EST, LEVL IV, 30-39 MIN: ICD-10-PCS | Mod: S$GLB,,, | Performed by: NURSE PRACTITIONER

## 2023-05-08 PROCEDURE — 99999 PR PBB SHADOW E&M-EST. PATIENT-LVL IV: CPT | Mod: PBBFAC,,, | Performed by: NURSE PRACTITIONER

## 2023-05-08 PROCEDURE — 99214 OFFICE O/P EST MOD 30 MIN: CPT | Mod: S$GLB,,, | Performed by: NURSE PRACTITIONER

## 2023-05-08 PROCEDURE — 1159F PR MEDICATION LIST DOCUMENTED IN MEDICAL RECORD: ICD-10-PCS | Mod: CPTII,S$GLB,, | Performed by: NURSE PRACTITIONER

## 2023-05-08 PROCEDURE — 3078F DIAST BP <80 MM HG: CPT | Mod: CPTII,S$GLB,, | Performed by: NURSE PRACTITIONER

## 2023-05-08 PROCEDURE — 1160F RVW MEDS BY RX/DR IN RCRD: CPT | Mod: CPTII,S$GLB,, | Performed by: NURSE PRACTITIONER

## 2023-05-08 PROCEDURE — 3078F PR MOST RECENT DIASTOLIC BLOOD PRESSURE < 80 MM HG: ICD-10-PCS | Mod: CPTII,S$GLB,, | Performed by: NURSE PRACTITIONER

## 2023-05-08 PROCEDURE — 99999 PR PBB SHADOW E&M-EST. PATIENT-LVL IV: ICD-10-PCS | Mod: PBBFAC,,, | Performed by: NURSE PRACTITIONER

## 2023-05-08 PROCEDURE — 3075F PR MOST RECENT SYSTOLIC BLOOD PRESS GE 130-139MM HG: ICD-10-PCS | Mod: CPTII,S$GLB,, | Performed by: NURSE PRACTITIONER

## 2023-05-08 PROCEDURE — 3008F BODY MASS INDEX DOCD: CPT | Mod: CPTII,S$GLB,, | Performed by: NURSE PRACTITIONER

## 2023-05-08 PROCEDURE — 1159F MED LIST DOCD IN RCRD: CPT | Mod: CPTII,S$GLB,, | Performed by: NURSE PRACTITIONER

## 2023-05-08 RX ORDER — SARILUMAB 200 MG/1.14ML
200 INJECTION, SOLUTION SUBCUTANEOUS
Qty: 2.28 ML | Refills: 2 | OUTPATIENT
Start: 2023-05-08

## 2023-05-08 NOTE — PROGRESS NOTES
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 into the lungs every 12 (twelve) hours. 30.6 g 3    calcium citrate-vitamin D3 315-200 mg (CITRACAL+D) 315 mg-5 mcg (200 unit) per tablet Take 1 tablet by mouth 2 (two) times daily.       cycloSPORINE (RESTASIS) 0.05 % ophthalmic emulsion Instill one drop into both eyes two times per day 180 each 3    diazePAM (VALIUM) 2 MG tablet Take one tablet by mouth 30 minutes prior to MRI. Ok to take second tablet right before MRI if you continue to feel anxious. 2 tablet 0    diclofenac sodium (VOLTAREN) 1 % Gel APPLY 2 GRAMS TOPICALLY 4 (FOUR) TIMES DAILY AS NEEDED. 300 g 2    estrogens, conjugated, (PREMARIN) 0.625 MG tablet take 1 tablet by mouth daily 90 tablet 4    fluconazole (DIFLUCAN) 100 MG tablet Take 1 tablet by mouth once daily for 14 days. 14 tablet 0    fluconazole (DIFLUCAN) 150 MG Tab Take 150 mg by mouth as needed.      halobetasol propion-tazarotene (DUOBRII) 0.01-0.045 % Lotn aaa on feet and hands qhs  then vaseline and warm towel (Patient taking differently: aaa on feet and hands qhs  then vaseline and warm towel) 100 g 3    INV PROPULSID 10 MG Take 2 tablets (20 mg) by mouth 4 (four) times daily. FOR INVESTIGATIONAL USE ONLY. Protocol [id number]  Subject ID: [id number]. 1440 each 0    ketoconazole (NIZORAL) 2 % cream Apply to feet twice daily for 3 weeks (Patient taking differently: Apply to feet " twice daily for 3 weeks) 60 g 3    leflunomide (ARAVA) 20 MG Tab Take 1 tablet (20 mg total) by mouth once daily. 90 tablet 0    lifitegrast (XIIDRA) 5 % Dpet INSTILL ONE DROP INTO BOTH EYES TWICE A DAY 60 mL 10    magic mouthwash diphen/antac/lidoc Swish and Spit 5 mL by mouth for 30 seconds twice daily. 150 mL 0    methenamine (HIPREX) 1 gram Tab Take 1 tablet (1 g total) by mouth 2 (two) times daily. 180 tablet 3    methocarbamoL (ROBAXIN) 750 MG Tab Take 1 tablet (750 mg total) by mouth 3 (three) times daily. 90 tablet 0    mirabegron (MYRBETRIQ) 25 mg Tb24 ER tablet Take 1 tablet (25 mg total) by mouth once daily. 90 tablet 0    montelukast (SINGULAIR) 10 mg tablet Take 1 tablet (10 mg total) by mouth every evening. 90 tablet 3    naproxen (NAPROSYN) 500 MG tablet Take 1 tablet (500 mg total) by mouth 2 (two) times daily as needed (prn). 180 tablet 1    omeprazole (PRILOSEC) 40 MG capsule Take 1 capsule (40 mg total) by mouth every morning. 30 capsule 6    omeprazole-sodium bicarbonate (ZEGERID) 40-1.1 mg-gram per capsule TAKE 1 CAPSULE BY MOUTH EVERY DAY IN THE MORNING 90 capsule 3    POTASSIUM ORAL Take by mouth once daily.      predniSONE (DELTASONE) 1 MG tablet TAKE 5 TABLETS (5 MG TOTAL) BY MOUTH ONCE DAILY. 450 tablet 1    predniSONE (DELTASONE) 5 MG tablet Take 1 tablet (5 mg total) by mouth once daily. 90 tablet 1    pregabalin (LYRICA) 50 MG capsule Take 3 capsules (150 mg total) by mouth every evening. 270 capsule 1    PREMARIN vaginal cream PLACE 0.5 GRAM VAGINALLY 3 (THREE) TIMES A WEEK. 30 g 1    progesterone (PROMETRIUM) 100 MG capsule Take 1 capsule by mouth daily. 90 capsule 1    progesterone (PROMETRIUM) 100 MG capsule take 1 capsule by mouth daily 90 capsule 4    promethazine (PHENERGAN) 25 MG tablet Take 1 tablet (25 mg total) by mouth every 6 (six) hours as needed for Nausea. 30 tablet 1    promethazine (PHENERGAN) 25 MG tablet Take 1 tablet (25 mg total) by mouth every 6 (six) hours as  needed for Nausea. 15 tablet 0    rosuvastatin (CRESTOR) 20 MG tablet Take 1 tablet (20 mg total) by mouth every evening. 90 tablet 3    sarilumab (KEVZARA) 200 mg/1.14 mL Syrg Inject 1.14 mL (200 mg total) into the skin every 14 (fourteen) days. 2.28 mL 2    sucralfate (CARAFATE) 1 gram tablet TAKE 1 TABLET (1 G TOTAL) BY MOUTH 4 (FOUR) TIMES DAILY. 360 tablet 2    sucralfate (CARAFATE) 1 gram tablet Take 1 tablet (1 g total) by mouth 4 (four) times daily. 360 tablet 3    traMADoL (ULTRAM) 50 mg tablet Take 1 tablet (50 mg total) by mouth every 12 (twelve) hours as needed for Pain. 60 tablet 0    albuterol-ipratropium (DUO-NEB) 2.5 mg-0.5 mg/3 mL nebulizer solution Take 3 mLs by nebulization every 6 (six) hours as needed for Wheezing. Rescue 90 mL 3    aspirin 325 MG tablet Take 1 tablet (325 mg total) by mouth once daily. 30 tablet 0     No current facility-administered medications for this visit.       REVIEW OF SYSTEMS:    GENERAL:  No weight loss, malaise or fevers.  HEENT:  Negative for frequent or significant headaches.  NECK:  Negative for lumps, goiter, pain and significant neck swelling.  RESPIRATORY:  Negative for cough, wheezing or shortness of breath. Asthma.  CARDIOVASCULAR:  Negative for chest pain, leg swelling or palpitations. CAD.  GI:  Negative for abdominal discomfort, blood in stools or black stools or change in bowel habits.  MUSCULOSKELETAL:  See HPI.  SKIN:  Negative for lesions, rash, and itching.  PSYCH:  Negative for sleep disturbance, mood disorder and recent psychosocial stressors.  HEMATOLOGY/LYMPHOLOGY:  Negative for prolonged bleeding, bruising easily or swollen nodes.  NEURO:   No history of headaches, syncope, paralysis, seizures or tremors.  All other reviewed and negative other than HPI.    Past Medical History:  Past Medical History:   Diagnosis Date    Acid reflux     Allergy     Anemia     Asthma     Coronary artery disease     CS (cervical spondylosis) 03/08/2013     Degenerative disc disease     Degenerative joint disease of hindfoot, left 6/28/2022    Dry eyes     Dry mouth     Gastroparesis     History of methotrexate therapy 01/19/2022    Hyperlipidemia     Lateral meniscus derangement 04/06/2016    Lobular carcinoma in situ     Lumbar spondylosis 03/08/2013    Osteoarthritis     Osteoporosis     Pes planovalgus, acquired, left 6/28/2022    Rheumatoid arthritis(714.0)     Rupture of left triceps tendon 10/17/2018    Umbilical hernia 08/13/2015       Past Surgical History:  Past Surgical History:   Procedure Laterality Date    BREAST BIOPSY Left 01/29/2002    core bx    CARPAL TUNNEL RELEASE Right 05/2017    CHOLECYSTECTOMY  2004    COLONOSCOPY      10/11    COLONOSCOPY N/A 6/29/2017    Procedure: COLONOSCOPY;  Surgeon: López Moore MD;  Location: [facility] ENDO ([address] [place] [address]);  Service: Endoscopy;  Laterality: N/A;    EPIDURAL STEROID INJECTION N/A 11/18/2021    Procedure: INJECTION, STEROID, EPIDURAL, L5-S1 IL NEED CONSENT;  Surgeon: Tj Mayfield MD;  Location: [facility] PAIN MGT;  Service: Pain Management;  Laterality: N/A;    EPIDURAL STEROID INJECTION N/A 9/29/2022    Procedure: LUMBAR L3/L4 IL MADELINE CONTRAST;  Surgeon: Tj Mayfield MD;  Location: [facility] PAIN MGT;  Service: Pain Management;  Laterality: N/A;    EPIDURAL STEROID INJECTION N/A 12/12/2022    Procedure: INJECTION, STEROID, EPIDURAL L5/S1 IL CONTRAST;  Surgeon: Tj Mayfield MD;  Location: [facility] PAIN MGT;  Service: Pain Management;  Laterality: N/A;    EPIDURAL STEROID INJECTION N/A 4/6/2023    Procedure: INJECTION, STEROID, EPIDURAL L5/S1;  Surgeon: Tj Mayfield MD;  Location: [facility] PAIN MGT;  Service: Pain Management;  Laterality: N/A;    FOOT ARTHRODESIS Left 1/11/2023    Procedure: FUSION, FOOT;  Surgeon: Ariella Finnegan MD;  Location: [facility] OR;  Service: Orthopedics;  Laterality: Left;  nimbus    HARVESTING OF BONE GRAFT Left 1/11/2023    Procedure: SURGICAL PROCUREMENT, BONE GRAFT;  Surgeon: Ariella Finnegan MD;  Location:  [facility] OR;  Service: Orthopedics;  Laterality: Left;    INJECTION OF ANESTHETIC AGENT AROUND NERVE Bilateral 8/23/2021    Procedure: BLOCK, NERVE, MEDIAL BRANCH L3,L4,L5;  Surgeon: Tj Mayfield MD;  Location: [facility] PAIN [facility]GT;  Service: Pain Management;  Laterality: Bilateral;  1 of 2    INJECTION OF ANESTHETIC AGENT AROUND NERVE Bilateral 8/26/2021    Procedure: BLOCK, NERVE, MEDIAL BRANCH L3,L4,L5;  Surgeon: Tj Mayfield MD;  Location: [facility] PAIN MGT;  Service: Pain Management;  Laterality: Bilateral;  2 of 2    INJECTION OF ANESTHETIC AGENT AROUND NERVE Left 7/7/2022    Procedure: BLOCK, NERVE, LEFT OBTURATOR AND FEMORAL;  Surgeon: Tj Mayfield MD;  Location: [facility] PAIN MGT;  Service: Pain Management;  Laterality: Left;    INJECTION OF FACET JOINT Bilateral 3/12/2020    Procedure: FACET JOINT INJECTION (LUMBAR BLOCK) BILATERAL L4-5 AND L5-S1 DIRECT REFERRAL;  Surgeon: Tj Mayfield MD;  Location: [facility] PAIN [facility]GT;  Service: Pain Management;  Laterality: Bilateral;  NEEDS CONSENT    INJECTION OF FACET JOINT Bilateral 3/29/2021    Procedure: INJECTION, FACET JOINT L3/4, L4/5, L5/S1;  Surgeon: Tj Mayfield MD;  Location: [facility]IN [facility]GT;  Service: Pain Management;  Laterality: Bilateral;    INJECTION OF JOINT Right 7/30/2020    Procedure: INJECTION, JOINT, RIGHT HIP Interarticular under flouro;  Surgeon: Tj Mayfield MD;  Location: [facility]IN [facility]GT;  Service: Pain Management;  Laterality: Right;  INJECTION, JOINT, RIGHT HIP Interarticular under flouro    INJECTION OF JOINT Right 2/21/2022    Procedure: Injection, Joint RIGHT ISCHIAL BURSA;  Surgeon: Tj Mayfield MD;  Location: [facility]GT;  Service: Pain Management;  Laterality: Right;    INTRAMEDULLARY RODDING OF FEMUR Left 5/21/2019    Procedure: INSERTION, INTRAMEDULLARY ARIEL, FEMUR;  Surgeon: López Duff MD;  Location: [facility] OR [address] [place] [address];  Service: Orthopedics;  Laterality: Left;    LENGTHENING OF TENDON OF LOWER EXTREMITY Left 1/11/2023    Procedure: LENGTHENING, TENDON,  LOWER EXTREMITY;  Surgeon: Ariella Finnegan MD;  Location: [facility] OR;  Service: Orthopedics;  Laterality: Left;    RADIOFREQUENCY ABLATION Left 9/20/2021    Procedure: RADIOFREQUENCY ABLATION left L3,4,5 RFA  1 of 2  CONSENT NEEDED;  Surgeon: Tj Mayfield MD;  Location: [facility] PAIN MGT;  Service: Pain Management;  Laterality: Left;    RADIOFREQUENCY ABLATION Right 10/4/2021    Procedure: RADIOFREQUENCY ABLATION  right L3,4,5 RFA   2 of 2  CONSENT NEEDED;  Surgeon: Tj Mayfield MD;  Location: [facility] PAIN MGT;  Service: Pain Management;  Laterality: Right;    RADIOFREQUENCY ABLATION Left 4/21/2022    Procedure: Radiofrequency Ablation LEFT L3,L4,L5;  Surgeon: Tj Mayfield MD;  Location: [facility] PAIN MGT;  Service: Pain Management;  Laterality: Left;    RADIOFREQUENCY ABLATION Right 5/12/2022    Procedure: Radiofrequency Ablation RIGHT L3,L4,L5;  Surgeon: Tj Mayfield MD;  Location: [facility] PAIN [facility]GT;  Service: Pain Management;  Laterality: Right;    RADIOFREQUENCY ABLATION Left 7/25/2022    Procedure: Radiofrequency Ablation Left Femoral & Obturator;  Surgeon: Tj Mayfield MD;  Location: [facility] PAIN MGT;  Service: Pain Management;  Laterality: Left;    REPAIR OF TRICEPS TENDON Left 10/17/2018    Procedure: REPAIR, TENDON, TRICEPS left elbow;  Surgeon: Staci Yarbrough MD;  Location: [facility] OR [address] [place] [address];  Service: Orthopedics;  Laterality: Left;  Anesthesia: General and regional. PRONE, k-wire , hand pan 1 and pan 2, CALL ARTHREX/Tamera notified 10-12 LO    TONSILLECTOMY      TRANSFORAMINAL EPIDURAL INJECTION OF STEROID Right 8/31/2020    Procedure: INJECTION, STEROID, EPIDURAL, TRANSFORAMINAL,  APPROACH, L3-L4 and L4-L5 need consent;  Surgeon: Tj Mayfield MD;  Location: [facility];  Service: Pain Management;  Laterality: Right;    TRANSFORAMINAL EPIDURAL INJECTION OF STEROID Bilateral 7/23/2021    Procedure: INJECTION, STEROID, EPIDURAL, TRANSFORAMINAL APPROACH L4/5;  Surgeon: Tj Mayfield MD;  Location: [facility] PAIN MGT;   Service: Pain Management;  Laterality: Bilateral;    TRIGGER POINT INJECTION N/A 7/23/2021    Procedure: INJECTION, TRIGGER POINT SCAPULAR;  Surgeon: Tj Mayfield MD;  [facility];  Service: Pain Management;  Laterality: N/A;    TUBAL LIGATION  2003    UPPER GASTROINTESTINAL ENDOSCOPY      10/11    uterine ablation  2003       Family History:  Family History   Problem Relation Age of Onset    Cancer Mother         Lung Cancer    Emphysema Mother     Heart attack Mother     Alcohol abuse Mother     Cancer Father         Lung Cancer    Osteoarthritis Father     Lung cancer Father     Skin cancer Father     Alcohol abuse Father     Heart disease Brother     Heart attack Brother     Alcohol abuse Brother     Cirrhosis Brother     Osteoarthritis Paternal Aunt     Retinal detachment Maternal Aunt     No Known Problems Sister     No Known Problems Maternal Uncle     No Known Problems Paternal Uncle     No Known Problems Maternal Grandmother     No Known Problems Maternal Grandfather     No Known Problems Paternal Grandmother     No Known Problems Paternal Grandfather     Colon cancer Neg Hx     Esophageal cancer Neg Hx     Stomach cancer Neg Hx     Celiac disease Neg Hx     Diabetes Neg Hx     Thyroid disease Neg Hx     Amblyopia Neg Hx     Blindness Neg Hx     Cataracts Neg Hx     Glaucoma Neg Hx     Hypertension Neg Hx     Macular degeneration Neg Hx     Strabismus Neg Hx     Stroke Neg Hx        Social History:  Social History     Socioeconomic History    Marital status:    Tobacco Use    Smoking status: Never    Smokeless tobacco: Never   Substance and Sexual Activity    Alcohol use: Yes     Comment: 2-3 times per year.    Drug use: No    Sexual activity: Yes     Partners: Male     Birth control/protection: Surgical     Social Determinants of Health     Financial Resource Strain: Low Risk     Difficulty of Paying Living Expenses: Not hard at all   Food Insecurity: Unknown    Worried About Running Out  "of Food in the Last Year: Patient refused    Ran Out of Food in the Last Year: Patient refused   Transportation Needs: Unknown    Lack of Transportation (Medical): Patient refused    Lack of Transportation (Non-Medical): Patient refused   Physical Activity: Unknown    Days of Exercise per Week: 2 days   Stress: No Stress Concern Present    Feeling of Stress : Not at all   Social Connections: Unknown    Frequency of Communication with Friends and Family: Twice a week    Frequency of Social Gatherings with Friends and Family: Twice a week    Active Member of Clubs or Organizations: Yes    Attends Club or Organization Meetings: Patient refused    Marital Status:    Housing Stability: Unknown    Unable to Pay for Housing in the Last Year: No    Unstable Housing in the Last Year: No       OBJECTIVE:  /76 (BP Location: Left arm, Patient Position: Sitting, BP Method: Medium (Automatic))   Pulse 99   Ht 5' 1" (1.549 m)   Wt 63.9 kg (140 lb 14 oz)   LMP  (LMP Unknown)   BMI 26.62 kg/m²       PHYSICAL EXAMINATION:    General appearance: Well appearing, in no acute distress, alert.  Psych:  Mood and affect appropriate.  Back:  No TTP over lumbar facet joints bilaterally. Limited ROM with pain on lumbar extension and flexion.  Facet loading bilaterally, R>L. Negative SLR bilaterally.   MSK:  No pain on movement of right knee. TTP over right gluteal tendon and anterior hip.   Neuro: No loss of sensation.   Gait: Antalgic.    ASSESSMENT: 62 y.o. year old female with lower back and hip pain, consistent with the following diagnoses:     1. Lumbar spondylosis  Procedure Order to Pain Management    Procedure Order to Pain Management      2. Chronic pain syndrome        3. Lumbar radiculopathy        4. Myofascial pain        5. Spondylosis of lumbar region without myelopathy or radiculopathy        6. Osteoarthritis of left hip, unspecified osteoarthritis type                PLAN:      - Previous imaging was " reviewed and discussed with the patient today.    - Schedule for repeat right then left L3,4,5 RFAs, 2 weeks apart.     - Continue with PT.    - The patient will continue a home exercise routine to help with pain and strengthening.      - RTC 4 week after completion of procedures.      The above plan and management options were discussed at length with patient. Patient is in agreement with the above and verbalized understanding.    Valarie Law  05/08/2023

## 2023-05-08 NOTE — TELEPHONE ENCOUNTER
Outgoing call: Patient is due to inject on 5/14, I informed her that a refill request was sent to her MD and once approved [facility] will follow up.

## 2023-05-09 ENCOUNTER — OFFICE VISIT (OUTPATIENT)
Dept: PODIATRY | Facility: CLINIC | Age: 63
End: 2023-05-09
Payer: MEDICARE

## 2023-05-09 ENCOUNTER — PATIENT MESSAGE (OUTPATIENT)
Dept: ORTHOPEDICS | Facility: CLINIC | Age: 63
End: 2023-05-09
Payer: MEDICARE

## 2023-05-09 VITALS
DIASTOLIC BLOOD PRESSURE: 82 MMHG | WEIGHT: 140 LBS | RESPIRATION RATE: 18 BRPM | HEART RATE: 96 BPM | HEIGHT: 61 IN | SYSTOLIC BLOOD PRESSURE: 152 MMHG | BODY MASS INDEX: 26.43 KG/M2

## 2023-05-09 DIAGNOSIS — M54.16 LUMBAR RADICULOPATHY: ICD-10-CM

## 2023-05-09 DIAGNOSIS — M05.79 RHEUMATOID ARTHRITIS INVOLVING MULTIPLE SITES WITH POSITIVE RHEUMATOID FACTOR: Chronic | ICD-10-CM

## 2023-05-09 DIAGNOSIS — L97.511 ULCER OF RIGHT FOOT, LIMITED TO BREAKDOWN OF SKIN: Primary | ICD-10-CM

## 2023-05-09 DIAGNOSIS — M79.2 NEUROPATHIC PAIN OF LEFT FOOT: ICD-10-CM

## 2023-05-09 DIAGNOSIS — M20.41 HAMMER TOE OF RIGHT FOOT: ICD-10-CM

## 2023-05-09 PROCEDURE — 87186 SC STD MICRODIL/AGAR DIL: CPT | Performed by: PODIATRIST

## 2023-05-09 PROCEDURE — 3077F PR MOST RECENT SYSTOLIC BLOOD PRESSURE >= 140 MM HG: ICD-10-PCS | Mod: CPTII,S$GLB,, | Performed by: PODIATRIST

## 2023-05-09 PROCEDURE — 11042 DBRDMT SUBQ TIS 1ST 20SQCM/<: CPT | Mod: S$GLB,,, | Performed by: PODIATRIST

## 2023-05-09 PROCEDURE — 99203 PR OFFICE/OUTPT VISIT, NEW, LEVL III, 30-44 MIN: ICD-10-PCS | Mod: 25,S$GLB,, | Performed by: PODIATRIST

## 2023-05-09 PROCEDURE — 99999 PR PBB SHADOW E&M-EST. PATIENT-LVL V: CPT | Mod: PBBFAC,,, | Performed by: PODIATRIST

## 2023-05-09 PROCEDURE — 11042 WOUND DEBRIDEMENT: ICD-10-PCS | Mod: S$GLB,,, | Performed by: PODIATRIST

## 2023-05-09 PROCEDURE — 1159F MED LIST DOCD IN RCRD: CPT | Mod: CPTII,S$GLB,, | Performed by: PODIATRIST

## 2023-05-09 PROCEDURE — 3079F PR MOST RECENT DIASTOLIC BLOOD PRESSURE 80-89 MM HG: ICD-10-PCS | Mod: CPTII,S$GLB,, | Performed by: PODIATRIST

## 2023-05-09 PROCEDURE — 1160F RVW MEDS BY RX/DR IN RCRD: CPT | Mod: CPTII,S$GLB,, | Performed by: PODIATRIST

## 2023-05-09 PROCEDURE — 99203 OFFICE O/P NEW LOW 30 MIN: CPT | Mod: 25,S$GLB,, | Performed by: PODIATRIST

## 2023-05-09 PROCEDURE — 3077F SYST BP >= 140 MM HG: CPT | Mod: CPTII,S$GLB,, | Performed by: PODIATRIST

## 2023-05-09 PROCEDURE — 3008F BODY MASS INDEX DOCD: CPT | Mod: CPTII,S$GLB,, | Performed by: PODIATRIST

## 2023-05-09 PROCEDURE — 87077 CULTURE AEROBIC IDENTIFY: CPT | Performed by: PODIATRIST

## 2023-05-09 PROCEDURE — 3008F PR BODY MASS INDEX (BMI) DOCUMENTED: ICD-10-PCS | Mod: CPTII,S$GLB,, | Performed by: PODIATRIST

## 2023-05-09 PROCEDURE — 1159F PR MEDICATION LIST DOCUMENTED IN MEDICAL RECORD: ICD-10-PCS | Mod: CPTII,S$GLB,, | Performed by: PODIATRIST

## 2023-05-09 PROCEDURE — 1160F PR REVIEW ALL MEDS BY PRESCRIBER/CLIN PHARMACIST DOCUMENTED: ICD-10-PCS | Mod: CPTII,S$GLB,, | Performed by: PODIATRIST

## 2023-05-09 PROCEDURE — 87070 CULTURE OTHR SPECIMN AEROBIC: CPT | Performed by: PODIATRIST

## 2023-05-09 PROCEDURE — 99999 PR PBB SHADOW E&M-EST. PATIENT-LVL V: ICD-10-PCS | Mod: PBBFAC,,, | Performed by: PODIATRIST

## 2023-05-09 PROCEDURE — 3079F DIAST BP 80-89 MM HG: CPT | Mod: CPTII,S$GLB,, | Performed by: PODIATRIST

## 2023-05-09 NOTE — PATIENT INSTRUCTIONS
Ulcers    Ulcers, which are open sores in the skin, occur when the outer layers of the skin are injured and the deeper tissues become exposed. They can be caused by excess pressure due to ill-fitting shoes, long periods in bed or after an injury that breaks the skin. Ulcers are commonly seen in patients living with diabetes, neuropathy or vascular disease. Open wounds can put patients at increased risk of developing infection in the skin and bone.    The signs and symptoms of ulcers may include drainage, odor or red, inflamed, thickened tissue. Pain may or may not be present.    Diagnosis may include x-rays to evaluate possible bone involvement. Other advanced imaging studies may also be ordered to evaluate for vascular disease, which may affect a patients ability to heal the wound.    Ulcers are treated by removing the unhealthy tissue and performing local wound care to assist in healing. Special shoes or padding may be used to remove excess pressure on the area. If infection is present, antibiotics will be necessary. In severe cases that involve extensive infection or are slow to heal, surgery or other advanced wound care treatments may be necessary.          Force or friction against the bottom of your foot causes the skin to thicken. This thick skin is called a callus. If the skin keeps thickening, the callus presses up into the foot. A callus pressing into the foot may harm healthy tissue. This can cause a pressure injury (ulcer). As healthy skin dies, a pressure injury forms. Pressure injuries may change from hot spots to infected wounds very quickly.     But you may not notice the pain if you have nerve damage (neuropathy). This health problem limits the feeling in your feet. Diabetes is a common cause of neuropathy and neuropathic ulceration.    Red hot spots on the skin are signs of pressure or friction. They are a warning to take care of your feet. A hot spot is likely to blister if the pressure is not  eased. Left untreated, a blister can turn into an open wound, a corn, or a callus. A corn is thickened skin on top of the foot. Surgery to fix bunions, claw toes, or hammertoes may ease pressure on hot spots and pressure injuries. Knee walkers can also let you to get around without putting pressure on the affected foot. Special therapeutic shoes can also help ease pressure.    If a corn or callus presses into the foot, it destroys inner layers of skin and fat. Cracks and sores may form. These open wounds are pressure injuries. They can let infection enter the body. In some cases, dead skin, such as a corn or callus, may cover an open wound. This can make it harder to see.    If bacteria enter the pressure injury, infection sets in. This causes healthier tissue to die. The infected pressure injury may start to drain. The discharge may be white, yellow, or green. Some infected pressure injuries bleed or have a bad odor. The skin around an infected pressure injury may become red or warm. Call your healthcare provider right away if you think you have an infected pressure injury.    You will need follow-up visits to your healthcare provider even if your pressure injury goes away. This is because the affected area stays weak. There may be underlying weaknesses that need to be treated, such as poor circulation. The chance of a new pressure injury is high. Check your feet each day for signs of pressure injuries. Use a mirror to check the bottom of your feet. Take action if you see changes.

## 2023-05-09 NOTE — PROGRESS NOTES
"[facility] PODIATRY  [address] [place] [address], [address]  [facility] [address]  Dept: [phone]  Dept Fax: [phone]    Isiah Mark Jr., DPM     Assessment:   MDM    Coding  1. Ulcer of right foot, limited to breakdown of skin  Ambulatory referral/consult to Podiatry    US Lower Extremity Arteries Bilateral    X-Ray Foot Complete Right    Prealbumin    Comprehensive Metabolic Panel    CBC Auto Differential    Procalcitonin    C-Reactive Protein    Sedimentation rate    Aerobic culture (Specify Source)    Wound Debridement      2. Hammer toe of right foot        3. Rheumatoid arthritis involving multiple sites with positive rheumatoid factor  Ambulatory referral/consult to Podiatry      4. Neuropathic pain of left foot        5. Lumbar radiculopathy            Plan:     Wound Debridement    Date/Time: 5/9/2023 2:03 PM  Performed by: Isiah Mark Jr., DPM  Authorized by: Isiah Mark Jr., DPM     Time out: Immediately prior to procedure a "time out" was called to verify the correct patient, procedure, equipment, support staff and site/side marked as required.    Consent Done?:  Yes (Verbal)  Local anesthesia used?: No      Wound Details:    Location:  Right foot    Location:  Right 3rd Toe    Type of Debridement:  Excisional       Length (cm):  0.1       Area (sq cm):  0.01       Width (cm):  0.1       Percent Debrided (%):  100       Depth (cm):  0.5       Total Area Debrided (sq cm):  0.01    Depth of debridement:  Subcutaneous tissue    Tissue debrided:  Epidermis and Subcutaneous    Devitalized tissue debrided:  Biofilm, Callus, Fibrin and Slough    Instruments:  Curette  Bleeding:  Minimal  Hemostasis Achieved: Yes  Method Used:  Silver Nitrate  Patient tolerance:  Patient tolerated the procedure well with no immediate complications  1st Wound Pain Assessment: 3  Specimen Collected: Specimen sent to microbiology  [facility] was seen today for foot ulcer.    Diagnoses and all orders " for this visit:    Ulcer of right foot, limited to breakdown of skin  -     Ambulatory referral/consult to Podiatry  -     US Lower Extremity Arteries Bilateral; Future  -     X-Ray Foot Complete Right; Future  -     Prealbumin; Future  -     Comprehensive Metabolic Panel; Future  -     CBC Auto Differential; Future  -     Procalcitonin; Future  -     C-Reactive Protein; Future  -     Sedimentation rate; Future  -     Aerobic culture (Specify Source)  -     Wound Debridement    Hammer toe of right foot    Rheumatoid arthritis involving multiple sites with positive rheumatoid factor  -     Ambulatory referral/consult to Podiatry    Neuropathic pain of left foot    Lumbar radiculopathy        -pt seen, evaluated, and managed  -dx discussed in detail. All questions/concerns addressed  -all tx options discussed. All alternatives, risks, benefits of all txs discussed  -the patient was educated about the diagnosis  -clinically a R 3rd toe pressure ulceration from RA deformity of toe  -we agreed on initiating treatment on the R foot. We recd f/u with dr. Finnegan for L foot.  -xr/imaging on way out--> will review at nxt visit  -labs on way out--> will review at nxt visit  -chairside culture obtained and sent for micro analysis  -betadine DSD applied. Pt to change QD at home    -rxs dispensed: none  -referrals: none  -WB: wbat    Short-term goals include but are not limited to: maintaining good offloading and minimizing bioburden, promoting granulation and epithelialization to healing.  Wound healing is a medically reasonable expectation based on the clinical circumstances documented.    Long-term goals include but are not limited to: keeping the wound healed by good offloading and medical management under the direction of internist.    Advised pt of risks of current condition including but not limited to: worsening of infection, potential for limb loss    Follow-up: Patient is to return to the clinic in 2 week for follow-up  but should call [facility] immediately if any signs of infection, such as fever, chills, sweats, increased redness or pain.    Follow up in about 2 weeks (around 5/23/2023).    Subjective:      Patient ID: Joyce Peterson is a 62 y.o. female.    Chief Complaint:   Chief Complaint   Patient presents with    Foot Ulcer     Right        CC - foot ulcer: Patient presents to the clinic for evaluation and treatment of high risk feet. The patient's chief complaint is foot ulcer, R foot. duration: several wks. denies n/v/f/c.    Of note - she is s/p left triple arthrodesis, midfoot arthrodesis, ARLEN, CBG on 1/11/23 w/ dr. Finnegan. Overall she is very happy with result of L foot reconstruction.      HPI    Last Podiatry Enc: Visit date not found  Last Enc w/ Me: Visit date not found    Outside reports reviewed: historical medical records.  Family hx: as below  Past Medical History:   Diagnosis Date    Acid reflux     Allergy     Anemia     Asthma     Coronary artery disease     CS (cervical spondylosis) 03/08/2013    Degenerative disc disease     Degenerative joint disease of hindfoot, left 6/28/2022    Dry eyes     Dry mouth     Gastroparesis     History of methotrexate therapy 01/19/2022    Hyperlipidemia     Lateral meniscus derangement 04/06/2016    Lobular carcinoma in situ     Lumbar spondylosis 03/08/2013    Osteoarthritis     Osteoporosis     Pes planovalgus, acquired, left 6/28/2022    Rheumatoid arthritis(714.0)     Rupture of left triceps tendon 10/17/2018    Umbilical hernia 08/13/2015     Past Surgical History:   Procedure Laterality Date    BREAST BIOPSY Left 01/29/2002    core bx    CARPAL TUNNEL RELEASE Right 05/2017    CHOLECYSTECTOMY  2004    COLONOSCOPY      10/11    COLONOSCOPY N/A 6/29/2017    Procedure: COLONOSCOPY;  Surgeon: López Moore MD;  Location: [facility] ([address] [place] [address]);  Service: Endoscopy;  Laterality: N/A;    EPIDURAL STEROID INJECTION N/A 11/18/2021    Procedure: INJECTION, STEROID, EPIDURAL,  L5-S1 IL NEED CONSENT;  Surgeon: Tj Mayfield MD;  Location: [facility] PAIN MGT;  Service: Pain Management;  Laterality: N/A;    EPIDURAL STEROID INJECTION N/A 9/29/2022    Procedure: LUMBAR L3/L4 IL MADELINE CONTRAST;  Surgeon: Tj Mayfield MD;  Location: [facility] PAIN MGT;  Service: Pain Management;  Laterality: N/A;    EPIDURAL STEROID INJECTION N/A 12/12/2022    Procedure: INJECTION, STEROID, EPIDURAL L5/S1 IL CONTRAST;  Surgeon: Tj Mayfield MD;  Location: [facility] PAIN MGT;  Service: Pain Management;  Laterality: N/A;    EPIDURAL STEROID INJECTION N/A 4/6/2023    Procedure: INJECTION, STEROID, EPIDURAL L5/S1;  Surgeon: Tj Mayfield MD;  Location: [facility] PAIN MGT;  Service: Pain Management;  Laterality: N/A;    FOOT ARTHRODESIS Left 1/11/2023    Procedure: FUSION, FOOT;  Surgeon: Ariella Finnegan MD;  Location: [facility] OR;  Service: Orthopedics;  Laterality: Left;  [facility]    HARVESTING OF BONE GRAFT Left 1/11/2023    Procedure: SURGICAL PROCUREMENT, BONE GRAFT;  Surgeon: Ariella Finnegan MD;  Location: [facility] OR;  Service: Orthopedics;  Laterality: Left;    INJECTION OF ANESTHETIC AGENT AROUND NERVE Bilateral 8/23/2021    Procedure: BLOCK, NERVE, MEDIAL BRANCH L3,L4,L5;  Surgeon: Tj Mayfield MD;  Location: [facility] PAIN MGT;  Service: Pain Management;  Laterality: Bilateral;  1 of 2    INJECTION OF ANESTHETIC AGENT AROUND NERVE Bilateral 8/26/2021    Procedure: BLOCK, NERVE, MEDIAL BRANCH L3,L4,L5;  Surgeon: Tj Mayfield MD;  Location: [facility] PAIN MGT;  Service: Pain Management;  Laterality: Bilateral;  2 of 2    INJECTION OF ANESTHETIC AGENT AROUND NERVE Left 7/7/2022    Procedure: BLOCK, NERVE, LEFT OBTURATOR AND FEMORAL;  Surgeon: Tj Mayfield MD;  Location: [facility] PAIN MGT;  Service: Pain Management;  Laterality: Left;    INJECTION OF FACET JOINT Bilateral 3/12/2020    Procedure: FACET JOINT INJECTION (LUMBAR BLOCK) BILATERAL L4-5 AND L5-S1 DIRECT REFERRAL;  Surgeon: Tj Mayfield MD;  Location: [facility] PAIN MGT;  Service: Pain Management;   Laterality: Bilateral;  NEEDS CONSENT    INJECTION OF FACET JOINT Bilateral 3/29/2021    Procedure: INJECTION, FACET JOINT L3/4, L4/5, L5/S1;  Surgeon: Tj Mayfield MD;  Location: [facility] PAIN MGT;  Service: Pain Management;  Laterality: Bilateral;    INJECTION OF JOINT Right 7/30/2020    Procedure: INJECTION, JOINT, RIGHT HIP Interarticular under flouro;  Surgeon: Tj Mayfield MD;  Location: [facility] PAIN [facility]GT;  Service: Pain Management;  Laterality: Right;  INJECTION, JOINT, RIGHT HIP Interarticular under flouro    INJECTION OF JOINT Right 2/21/2022    Procedure: Injection, Joint RIGHT ISCHIAL BURSA;  Surgeon: Tj Mayfield MD;  Location: [facility] PAIN MGT;  Service: Pain Management;  Laterality: Right;    INTRAMEDULLARY RODDING OF FEMUR Left 5/21/2019    Procedure: INSERTION, INTRAMEDULLARY ARIEL, FEMUR;  Surgeon: López Duff MD;  Location: [facility] OR [address] [place] [address];  Service: Orthopedics;  Laterality: Left;    LENGTHENING OF TENDON OF LOWER EXTREMITY Left 1/11/2023    Procedure: LENGTHENING, TENDON, LOWER EXTREMITY;  Surgeon: Ariella Finnegan MD;  Location: [facility] OR;  Service: Orthopedics;  Laterality: Left;    RADIOFREQUENCY ABLATION Left 9/20/2021    Procedure: RADIOFREQUENCY ABLATION left L3,4,5 RFA  1 of 2  CONSENT NEEDED;  Surgeon: Tj Mayfield MD;  Location: [facility] PAIN MGT;  Service: Pain Management;  Laterality: Left;    RADIOFREQUENCY ABLATION Right 10/4/2021    Procedure: RADIOFREQUENCY ABLATION  right L3,4,5 RFA   2 of 2  CONSENT NEEDED;  Surgeon: Tj Mayfield MD;  Location: [facility] PAIN MGT;  Service: Pain Management;  Laterality: Right;    RADIOFREQUENCY ABLATION Left 4/21/2022    Procedure: Radiofrequency Ablation LEFT L3,L4,L5;  Surgeon: Tj Mayfield MD;  Location: [facility] PAIN MGT;  Service: Pain Management;  Laterality: Left;    RADIOFREQUENCY ABLATION Right 5/12/2022    Procedure: Radiofrequency Ablation RIGHT L3,L4,L5;  Surgeon: Tj Mayfield MD;  Location: [facility] PAIN MGT;  Service: Pain Management;  Laterality: Right;     RADIOFREQUENCY ABLATION Left 7/25/2022    Procedure: Radiofrequency Ablation Left Femoral & Obturator;  Surgeon: Tj Mayfield MD;  Location: [facility] PAIN MGT;  Service: Pain Management;  Laterality: Left;    REPAIR OF TRICEPS TENDON Left 10/17/2018    Procedure: REPAIR, TENDON, TRICEPS left elbow;  Surgeon: Staci Yarbrough MD;  Location: [facility] OR 1ST FLR;  Service: Orthopedics;  Laterality: Left;  Anesthesia: General and regional. PRONE, k-wire , hand pan 1 and pan 2, CALL ARTHREX/Tamera notified 10-12 LO    TONSILLECTOMY      TRANSFORAMINAL EPIDURAL INJECTION OF STEROID Right 8/31/2020    Procedure: INJECTION, STEROID, EPIDURAL, TRANSFORAMINAL,  APPROACH, L3-L4 and L4-L5 need consent;  Surgeon: Tj Mayfield MD;  Location: [facility] PAIN MGT;  Service: Pain Management;  Laterality: Right;    TRANSFORAMINAL EPIDURAL INJECTION OF STEROID Bilateral 7/23/2021    Procedure: INJECTION, STEROID, EPIDURAL, TRANSFORAMINAL APPROACH L4/5;  Surgeon: Tj Mayfield MD;  Location: [facility] PAIN MGT;  Service: Pain Management;  Laterality: Bilateral;    TRIGGER POINT INJECTION N/A 7/23/2021    Procedure: INJECTION, TRIGGER POINT SCAPULAR;  Surgeon: Tj Mayfield MD;  Location: [facility] PAIN [facility]GT;  Service: Pain Management;  Laterality: N/A;    TUBAL LIGATION  2003    UPPER GASTROINTESTINAL ENDOSCOPY      10/11    uterine ablation  2003     Family History   Problem Relation Age of Onset    Cancer Mother         Lung Cancer    Emphysema Mother     Heart attack Mother     Alcohol abuse Mother     Cancer Father         Lung Cancer    Osteoarthritis Father     Lung cancer Father     Skin cancer Father     Alcohol abuse Father     Heart disease Brother     Heart attack Brother     Alcohol abuse Brother     Cirrhosis Brother     Osteoarthritis Paternal Aunt     Retinal detachment Maternal Aunt     No Known Problems Sister     No Known Problems Maternal Uncle     No Known Problems Paternal Uncle     No Known Problems Maternal Grandmother     No  Known Problems Maternal Grandfather     No Known Problems Paternal Grandmother     No Known Problems Paternal Grandfather     Colon cancer Neg Hx     Esophageal cancer Neg Hx     Stomach cancer Neg Hx     Celiac disease Neg Hx     Diabetes Neg Hx     Thyroid disease Neg Hx     Amblyopia Neg Hx     Blindness Neg Hx     Cataracts Neg Hx     Glaucoma Neg Hx     Hypertension Neg Hx     Macular degeneration Neg Hx     Strabismus Neg Hx     Stroke Neg Hx      Current Outpatient Medications   Medication Sig Dispense Refill    albuterol (PROVENTIL/VENTOLIN HFA) 90 mcg/actuation inhaler Inhale 2 puffs into the lungs every 6 (six) hours as needed. Rescue 20.1 g 11    ALPRAZolam (XANAX) 0.5 MG tablet Take Once on call to procedure 1 tablet 0    budesonide-formoterol 160-4.5 mcg (SYMBICORT) 160-4.5 mcg/actuation HFAA Inhale 2 puffs into the lungs every 12 (twelve) hours. 30.6 g 3    calcium citrate-vitamin D3 315-200 mg (CITRACAL+D) 315 mg-5 mcg (200 unit) per tablet Take 1 tablet by mouth 2 (two) times daily.       cycloSPORINE (RESTASIS) 0.05 % ophthalmic emulsion Instill one drop into both eyes two times per day 180 each 3    diazePAM (VALIUM) 2 MG tablet Take one tablet by mouth 30 minutes prior to MRI. Ok to take second tablet right before MRI if you continue to feel anxious. 2 tablet 0    diclofenac sodium (VOLTAREN) 1 % Gel APPLY 2 GRAMS TOPICALLY 4 (FOUR) TIMES DAILY AS NEEDED. 300 g 2    estrogens, conjugated, (PREMARIN) 0.625 MG tablet take 1 tablet by mouth daily 90 tablet 4    fluconazole (DIFLUCAN) 100 MG tablet Take 1 tablet by mouth once daily for 14 days. 14 tablet 0    fluconazole (DIFLUCAN) 150 MG Tab Take 150 mg by mouth as needed.      halobetasol propion-tazarotene (DUOBRII) 0.01-0.045 % Lotn aaa on feet and hands qhs  then vaseline and warm towel (Patient taking differently: aaa on feet and hands qhs  then vaseline and warm towel) 100 g 3    INV PROPULSID 10 MG Take 2 tablets (20 mg) by mouth 4 (four)  times daily. FOR INVESTIGATIONAL USE ONLY. Protocol [id number]  Subject ID: [id number]. 1440 each 0    ketoconazole (NIZORAL) 2 % cream Apply to feet twice daily for 3 weeks (Patient taking differently: Apply to feet twice daily for 3 weeks) 60 g 3    leflunomide (ARAVA) 20 MG Tab Take 1 tablet (20 mg total) by mouth once daily. 90 tablet 0    lifitegrast (XIIDRA) 5 % Dpet INSTILL ONE DROP INTO BOTH EYES TWICE A DAY 60 mL 10    magic mouthwash diphen/antac/lidoc Swish and Spit 5 mL by mouth for 30 seconds twice daily. 150 mL 0    methenamine (HIPREX) 1 gram Tab Take 1 tablet (1 g total) by mouth 2 (two) times daily. 180 tablet 3    methocarbamoL (ROBAXIN) 750 MG Tab Take 1 tablet (750 mg total) by mouth 3 (three) times daily. 90 tablet 0    mirabegron (MYRBETRIQ) 25 mg Tb24 ER tablet Take 1 tablet (25 mg total) by mouth once daily. 90 tablet 0    montelukast (SINGULAIR) 10 mg tablet Take 1 tablet (10 mg total) by mouth every evening. 90 tablet 3    naproxen (NAPROSYN) 500 MG tablet Take 1 tablet (500 mg total) by mouth 2 (two) times daily as needed (prn). 180 tablet 1    omeprazole (PRILOSEC) 40 MG capsule Take 1 capsule (40 mg total) by mouth every morning. 30 capsule 6    omeprazole-sodium bicarbonate (ZEGERID) 40-1.1 mg-gram per capsule TAKE 1 CAPSULE BY MOUTH EVERY DAY IN THE MORNING 90 capsule 3    POTASSIUM ORAL Take by mouth once daily.      predniSONE (DELTASONE) 1 MG tablet TAKE 5 TABLETS (5 MG TOTAL) BY MOUTH ONCE DAILY. 450 tablet 1    predniSONE (DELTASONE) 5 MG tablet Take 1 tablet (5 mg total) by mouth once daily. 90 tablet 1    pregabalin (LYRICA) 50 MG capsule Take 3 capsules (150 mg total) by mouth every evening. 270 capsule 1    PREMARIN vaginal cream PLACE 0.5 GRAM VAGINALLY 3 (THREE) TIMES A WEEK. 30 g 1    progesterone (PROMETRIUM) 100 MG capsule Take 1 capsule by mouth daily. 90 capsule 1    progesterone (PROMETRIUM) 100 MG capsule take 1 capsule by mouth daily 90 capsule 4    promethazine  (PHENERGAN) 25 MG tablet Take 1 tablet (25 mg total) by mouth every 6 (six) hours as needed for Nausea. 30 tablet 1    promethazine (PHENERGAN) 25 MG tablet Take 1 tablet (25 mg total) by mouth every 6 (six) hours as needed for Nausea. 15 tablet 0    rosuvastatin (CRESTOR) 20 MG tablet Take 1 tablet (20 mg total) by mouth every evening. 90 tablet 3    sarilumab (KEVZARA) 200 mg/1.14 mL Syrg Inject 1.14 mL (200 mg total) into the skin every 14 (fourteen) days. 2.28 mL 2    sucralfate (CARAFATE) 1 gram tablet TAKE 1 TABLET (1 G TOTAL) BY MOUTH 4 (FOUR) TIMES DAILY. 360 tablet 2    sucralfate (CARAFATE) 1 gram tablet Take 1 tablet (1 g total) by mouth 4 (four) times daily. 360 tablet 3    traMADoL (ULTRAM) 50 mg tablet Take 1 tablet (50 mg total) by mouth every 12 (twelve) hours as needed for Pain. 60 tablet 0    albuterol-ipratropium (DUO-NEB) 2.5 mg-0.5 mg/3 mL nebulizer solution Take 3 mLs by nebulization every 6 (six) hours as needed for Wheezing. Rescue 90 mL 3    aspirin 325 MG tablet Take 1 tablet (325 mg total) by mouth once daily. 30 tablet 0     No current facility-administered medications for this visit.     Review of patient's allergies indicates:   Allergen Reactions    Actemra [tocilizumab] Other (See Comments)     Severe dizziness    Codeine Nausea And Vomiting and Hives    Gold au 198 Hives and Rash    Hydroxychloroquine Other (See Comments)     Can't remember the reaction      Iodinated contrast media Other (See Comments)     Other reaction(s): BURNING ALL OVER    Iodine Other (See Comments) and Hives     Other reaction(s): BURNING ALL OVER - Iodine dye - Not topical    Ondansetron Nausea And Vomiting    Sulfa (sulfonamide antibiotics) Other (See Comments)     Can't remember the reaction    Zofran [ondansetron hcl (pf)] Nausea And Vomiting     Pt reports that last time she received zofran she started vomiting again    Methotrexate analogues Nausea Only    Pneumococcal vaccine Other (See Comments)     "Pneumovax 23 [pneumococcal 23-argenis ps vaccine] Other (See Comments)     "sick"    Methotrexate Nausea Only     Social History     Socioeconomic History    Marital status:    Tobacco Use    Smoking status: Never    Smokeless tobacco: Never   Substance and Sexual Activity    Alcohol use: Yes     Comment: 2-3 times per year.    Drug use: No    Sexual activity: Yes     Partners: Male     Birth control/protection: Surgical     Social Determinants of Health     Financial Resource Strain: Low Risk     Difficulty of Paying Living Expenses: Not hard at all   Food Insecurity: Unknown    Worried About Running Out of Food in the Last Year: Patient refused    Ran Out of Food in the Last Year: Patient refused   Transportation Needs: Unknown    Lack of Transportation (Medical): Patient refused    Lack of Transportation (Non-Medical): Patient refused   Physical Activity: Unknown    Days of Exercise per Week: 2 days   Stress: No Stress Concern Present    Feeling of Stress : Not at all   Social Connections: Unknown    Frequency of Communication with Friends and Family: Twice a week    Frequency of Social Gatherings with Friends and Family: Twice a week    Active Member of Clubs or Organizations: Yes    Attends Club or Organization Meetings: Patient refused    Marital Status:    Housing Stability: Unknown    Unable to Pay for Housing in the Last Year: No    Unstable Housing in the Last Year: No       ROS    REVIEW OF SYSTEMS: Negative as documented below as well as positive findings in bold.       Constitutional  Respiratory  Gastrointestinal  Skin   - Fever - Cough - Heartburn - Rash   - Chills - Spit blood - Nausea - Itching   - Weight Loss - Shortness of breath - Vomiting - Nail pain   - Malaise/Fatigue - Wheezing - Abdominal Pain  Wound/Ulcer   - Weight Gain   - Blood in Stool  Poor wound healing       - Diarrhea          Cardiovascular  Genitourinary  Neurological  HEENT   - Chest Pain - Dysuria - Burning Sensation " "of feet - Headache   - Palpitations - Hematuria - Tingling / Paresthesia - Congestion   - Pain at night in legs - Flank Pain - Dizziness - Sore Throat   - Cramping   - Tremor - Blurred Vision   - Leg Swelling   - Sensory Change - Double Vision   - Dizzy when standing   - Speech Change - Eye Redness       - Focal Weakness - Dry Eyes       - Loss of Consciousness          Endocrine  Musculoskeletal  Psychiatric   - Cold intolerance - Muscle Pain - Depression   - Heat intolerance - Neck Pain - Insomnia   - Anemia - Joint Pain - Memory Loss   -  Easy bruising, bleeding - Heel pain - Anxiety      Toe Pain        Leg/Ankle/Foot Pain         Objective:     BP (!) 152/82   Pulse 96   Resp 18   Ht 5' 1" (1.549 m)   Wt 63.5 kg (139 lb 15.9 oz)   LMP  (LMP Unknown)   BMI 26.45 kg/m²   Vitals:    05/09/23 1320   BP: (!) 152/82   Pulse: 96   Resp: 18   Weight: 63.5 kg (139 lb 15.9 oz)   Height: 5' 1" (1.549 m)   PainSc:   2   PainLoc: Foot       Physical Exam    General Appearance:   Patient appears well developed, well nourished  Patient appears stated age    Psychiatric:   Patient is oriented to time, place, and person.  Patient has appropriate mood and affect    Neck:  Trachea Midline  No visible masses    Respiratory/Ears:  No distress or labored breathing.  Able to differentiate between normal talking voice and whisper.  Able to follow commands    Eyes:  Visual Acuity intact  Lids and conjunctivae normal. No discoloration noted.    Physical Exam  Ortho Exam  Ortho/SPM Exam  Foot Exam  Physical Exam  Neurologic Exam    R LE exam con't:  V:  DP 1/4, PT 2/4   CRT< 3s to all digits tested   Tibial and popliteal lymph nodes are w/o abnormality    No edema, + VV    N:  Patient displays normal ankle reflexes   SILT in SP/DP/T/Opal/Saph distributions    Ortho: +Motor EHL/FHL/TA/GA   +TTP R 3rd toe   Compartments soft/compressible. No pain on passive stretch of big toe. No calf  Pain.   hammertoe deformity present 3rd " toe    Derm:  skin not intact, ulceration present,  ulcer probes to subQ      Ulcer Location: 3rd toe medial  Measurements (post-debridement): 0.1x0.1x0.5cm  Periwound: hyperkeratotic  Drainage: none  Malodor: none  Base:  fibrotic  Signs of infection: none    Imaging / Labs:    Hemoglobin A1C   Date Value Ref Range Status   03/15/2022 5.2 4.0 - 5.6 % Final     Comment:     ADA Screening Guidelines:  5.7-6.4%  Consistent with prediabetes  >or=6.5%  Consistent with diabetes    High levels of fetal hemoglobin interfere with the HbA1C  assay. Heterozygous hemoglobin variants (HbS, HgC, etc)do  not significantly interfere with this assay.   However, presence of multiple variants may affect accuracy.     02/06/2017 6.0 4.5 - 6.2 % Final     Comment:     According to ADA guidelines, hemoglobin A1C <7.0% represents  optimal control in non-pregnant diabetic patients.  Different  metrics may apply to specific populations.   Standards of Medical Care in Diabetes - 2016.  For the purpose of screening for the presence of diabetes:  <5.7%     Consistent with the absence of diabetes  5.7-6.4%  Consistent with increasing risk for diabetes   (prediabetes)  >or=6.5%  Consistent with diabetes  Currently no consensus exists for use of hemoglobin A1C  for diagnosis of diabetes for children.     09/28/2015 5.6 4.5 - 6.2 % Final           Note: This was dictated using a computer transcription program. Although proofread, it may contain computer transcription errors and phonetic errors. Other human proofreading errors may also exist. Corrections may be performed at a later time. Please contact us for any clarification if needed.    Isiah Mark DPM  [facility]

## 2023-05-10 ENCOUNTER — PATIENT MESSAGE (OUTPATIENT)
Dept: SPORTS MEDICINE | Facility: CLINIC | Age: 63
End: 2023-05-10
Payer: MEDICARE

## 2023-05-10 ENCOUNTER — PATIENT MESSAGE (OUTPATIENT)
Dept: ORTHOPEDICS | Facility: CLINIC | Age: 63
End: 2023-05-10
Payer: MEDICARE

## 2023-05-10 ENCOUNTER — OFFICE VISIT (OUTPATIENT)
Dept: ORTHOPEDICS | Facility: CLINIC | Age: 63
End: 2023-05-10
Payer: MEDICARE

## 2023-05-10 ENCOUNTER — TELEPHONE (OUTPATIENT)
Dept: ORTHOPEDICS | Facility: CLINIC | Age: 63
End: 2023-05-10
Payer: MEDICARE

## 2023-05-10 ENCOUNTER — PATIENT MESSAGE (OUTPATIENT)
Dept: PODIATRY | Facility: CLINIC | Age: 63
End: 2023-05-10
Payer: MEDICARE

## 2023-05-10 ENCOUNTER — PATIENT MESSAGE (OUTPATIENT)
Dept: OPTOMETRY | Facility: CLINIC | Age: 63
End: 2023-05-10
Payer: MEDICARE

## 2023-05-10 ENCOUNTER — TELEPHONE (OUTPATIENT)
Dept: OPHTHALMOLOGY | Facility: CLINIC | Age: 63
End: 2023-05-10
Payer: MEDICARE

## 2023-05-10 VITALS — BODY MASS INDEX: 26.43 KG/M2 | HEIGHT: 61 IN | WEIGHT: 140 LBS

## 2023-05-10 DIAGNOSIS — M19.072 DEGENERATIVE JOINT DISEASE OF HINDFOOT, LEFT: ICD-10-CM

## 2023-05-10 DIAGNOSIS — K31.84 GASTROPARESIS: Primary | ICD-10-CM

## 2023-05-10 DIAGNOSIS — M21.42 PES PLANOVALGUS, ACQUIRED, LEFT: ICD-10-CM

## 2023-05-10 DIAGNOSIS — Z98.890 STATUS POST LEFT FOOT SURGERY: Primary | ICD-10-CM

## 2023-05-10 DIAGNOSIS — Z00.6 PATIENT IN CLINICAL RESEARCH STUDY: ICD-10-CM

## 2023-05-10 PROCEDURE — 99999 PR PBB SHADOW E&M-EST. PATIENT-LVL IV: CPT | Mod: PBBFAC,,, | Performed by: PHYSICIAN ASSISTANT

## 2023-05-10 PROCEDURE — 3008F BODY MASS INDEX DOCD: CPT | Mod: CPTII,S$GLB,, | Performed by: PHYSICIAN ASSISTANT

## 2023-05-10 PROCEDURE — 3008F PR BODY MASS INDEX (BMI) DOCUMENTED: ICD-10-PCS | Mod: CPTII,S$GLB,, | Performed by: PHYSICIAN ASSISTANT

## 2023-05-10 PROCEDURE — 99213 PR OFFICE/OUTPT VISIT, EST, LEVL III, 20-29 MIN: ICD-10-PCS | Mod: S$GLB,,, | Performed by: PHYSICIAN ASSISTANT

## 2023-05-10 PROCEDURE — 1160F RVW MEDS BY RX/DR IN RCRD: CPT | Mod: CPTII,S$GLB,, | Performed by: PHYSICIAN ASSISTANT

## 2023-05-10 PROCEDURE — 99999 PR PBB SHADOW E&M-EST. PATIENT-LVL IV: ICD-10-PCS | Mod: PBBFAC,,, | Performed by: PHYSICIAN ASSISTANT

## 2023-05-10 PROCEDURE — 99213 OFFICE O/P EST LOW 20 MIN: CPT | Mod: S$GLB,,, | Performed by: PHYSICIAN ASSISTANT

## 2023-05-10 PROCEDURE — 1159F MED LIST DOCD IN RCRD: CPT | Mod: CPTII,S$GLB,, | Performed by: PHYSICIAN ASSISTANT

## 2023-05-10 PROCEDURE — 1159F PR MEDICATION LIST DOCUMENTED IN MEDICAL RECORD: ICD-10-PCS | Mod: CPTII,S$GLB,, | Performed by: PHYSICIAN ASSISTANT

## 2023-05-10 PROCEDURE — 1160F PR REVIEW ALL MEDS BY PRESCRIBER/CLIN PHARMACIST DOCUMENTED: ICD-10-PCS | Mod: CPTII,S$GLB,, | Performed by: PHYSICIAN ASSISTANT

## 2023-05-10 NOTE — TELEPHONE ENCOUNTER
Spoke to patient in regards to rescheduling her appointment to a virtual. Patient appointment has been rescheduled to audio virtual for 1:30 pm on 07/10/2023. Patient stated thank you. Thanks.

## 2023-05-10 NOTE — TELEPHONE ENCOUNTER
----- Message from Evita Samano sent at 5/10/2023  1:04 PM CDT -----  Regarding: Eye Infection  Pt called about having eye infection in both eyes and would like to be seen today.    Call back- [phone]    Returned patient call. No ans LVM

## 2023-05-11 ENCOUNTER — OFFICE VISIT (OUTPATIENT)
Dept: OPTOMETRY | Facility: CLINIC | Age: 63
End: 2023-05-11
Payer: MEDICARE

## 2023-05-11 DIAGNOSIS — H25.13 NUCLEAR SCLEROSIS OF BOTH EYES: ICD-10-CM

## 2023-05-11 DIAGNOSIS — Z13.5 SCREENING FOR EYE CONDITION: ICD-10-CM

## 2023-05-11 DIAGNOSIS — H10.13 ALLERGIC CONJUNCTIVITIS OF BOTH EYES: Primary | ICD-10-CM

## 2023-05-11 DIAGNOSIS — H04.123 BILATERAL DRY EYES: ICD-10-CM

## 2023-05-11 DIAGNOSIS — M06.9 RHEUMATOID ARTHRITIS, INVOLVING UNSPECIFIED SITE, UNSPECIFIED WHETHER RHEUMATOID FACTOR PRESENT: ICD-10-CM

## 2023-05-11 PROCEDURE — 1159F MED LIST DOCD IN RCRD: CPT | Mod: CPTII,S$GLB,, | Performed by: OPTOMETRIST

## 2023-05-11 PROCEDURE — 99999 PR PBB SHADOW E&M-EST. PATIENT-LVL II: CPT | Mod: PBBFAC,,, | Performed by: OPTOMETRIST

## 2023-05-11 PROCEDURE — 1159F PR MEDICATION LIST DOCUMENTED IN MEDICAL RECORD: ICD-10-PCS | Mod: CPTII,S$GLB,, | Performed by: OPTOMETRIST

## 2023-05-11 PROCEDURE — 92012 PR EYE EXAM, EST PATIENT,INTERMED: ICD-10-PCS | Mod: S$GLB,,, | Performed by: OPTOMETRIST

## 2023-05-11 PROCEDURE — 92012 INTRM OPH EXAM EST PATIENT: CPT | Mod: S$GLB,,, | Performed by: OPTOMETRIST

## 2023-05-11 PROCEDURE — 99999 PR PBB SHADOW E&M-EST. PATIENT-LVL II: ICD-10-PCS | Mod: PBBFAC,,, | Performed by: OPTOMETRIST

## 2023-05-11 RX ORDER — SARILUMAB 200 MG/1.14ML
200 INJECTION, SOLUTION SUBCUTANEOUS
Qty: 2.28 ML | Refills: 0 | Status: ACTIVE | OUTPATIENT
Start: 2023-05-11 | End: 2023-06-08 | Stop reason: SDUPTHER

## 2023-05-11 NOTE — PATIENT INSTRUCTIONS
Apparent allergy-related symptoms of redness in both eyes, with swelling of lids,  She reports similar signs/symptoms in both eyes previously, at about the same time of the year.  Reviewed previous notes.  Signs/symptoms resolved satisfactorily with use of FML ophthalmic suspension in both eyes.    Issued sample of FML ophthalmic suspension to Mrs. Peterson, and suggest she initiate treatment in both eyes (one drop two times per day).  Apply cold compresses to both eyes as often as desired throughout the day.    Call/email on 05/15/2023 with report on signs/symptoms.  However, call/return sooner, if any apparent worsening of signs/symptoms in the interim.

## 2023-05-11 NOTE — PROGRESS NOTES
HPI     Eye Problem            Comments: Eye problem both eyes - redness, lid swelling, dryness,   headaches at the end of the day.  Using Restasis, serum tears (all day long - prescribed for use six times   per day), and gel drops at night OU.  Reports she had similar   signs/symptoms previously, and at approximately at the same time of the   year.           Last edited by Patrice Jurado, OD on 5/11/2023  1:37 PM.            Assessment /Plan     For exam results, see Encounter Report.    1. Allergic conjunctivitis of both eyes        2. Bilateral dry eyes        3. Nuclear sclerosis of both eyes        4. Screening for eye condition                       Apparent allergy-related symptoms of redness in both eyes, with swelling of lids,  She reports similar signs/symptoms in both eyes previously, at about the same time of the year.  Reviewed previous notes.  Signs/symptoms resolved satisfactorily with use of FML ophthalmic suspension in both eyes.    Issued sample of FML ophthalmic suspension to Mrs. Peterson, and suggest she initiate treatment in both eyes (one drop two times per day).  Apply cold compresses to both eyes as often as desired throughout the day.    Call/email on 05/15/2023 with report on signs/symptoms.  However, call/return sooner, if any apparent worsening of signs/symptoms in the interim.

## 2023-05-11 NOTE — PROGRESS NOTES
Subjective:   Chief complaint: Follow-up left ankle surgery    Surgery history: 1/11/23 left triple arthrodesis, midfoot arthrodesis, ARLEN, CBG    HPI:   Joyce Peterson is a 62 y.o. female who presents today for postop follow-up.    She is here for a wound check- distal part of lateral incision is still not healed.  She saw podiatry yesterday for her right foot.  She is also concerned today about worsening hammertoes.     ROS:  Musculoskeletal: per HPI     Objective:   Exam:  General: No acute distress, well-appearing  Musculoskeletal: Standing examination demonstrates neutral plantigrade alignment but very guarded     Lateral foot incision has .5 cm area of unhealed wound and slough.  There is minimal drainage.  No erythema or warmth.  All other incisions benign.          Fires TA/GSC/PTT/peroneals but limited excusion.  SILT SP/DP/PT with baseline neuropathy        Assessment:     1. Status post left foot surgery    2. Pes planovalgus, acquired, left    3. Degenerative joint disease of hindfoot, left        4 months postop  Discussed wound with Dr. Finnegan and provided wound care recommendations       Plan:       Weight bearing: Weight bearing as tolerated left leg  Therapy: Continue PT  Restrictions: ADAT, use pain and swelling as guide  Wound care: Duoderm provided.  Showering ok.  Advised to change every 2-3 days.   Follow-up: scheduled with Dr. Finnegan  in 2 weeks.  Advised to update us sooner if any concerns about the wound    No orders of the defined types were placed in this encounter.      Past Medical History:   Diagnosis Date    Acid reflux     Allergy     Anemia     Asthma     Coronary artery disease     CS (cervical spondylosis) 03/08/2013    Degenerative disc disease     Degenerative joint disease of hindfoot, left 6/28/2022    Dry eyes     Dry mouth     Gastroparesis     History of methotrexate therapy 01/19/2022    Hyperlipidemia     Lateral meniscus derangement 04/06/2016    Lobular carcinoma in  situ     Lumbar spondylosis 03/08/2013    Osteoarthritis     Osteoporosis     Pes planovalgus, acquired, left 6/28/2022    Rheumatoid arthritis(714.0)     Rupture of left triceps tendon 10/17/2018    Umbilical hernia 08/13/2015       Past Surgical History:   Procedure Laterality Date    BREAST BIOPSY Left 01/29/2002    core bx    CARPAL TUNNEL RELEASE Right 05/2017    CHOLECYSTECTOMY  2004    COLONOSCOPY      10/11    COLONOSCOPY N/A 6/29/2017    Procedure: COLONOSCOPY;  Surgeon: López Moore MD;  [facility]  Service: Endoscopy;  Laterality: N/A;    EPIDURAL STEROID INJECTION N/A 11/18/2021    Procedure: INJECTION, STEROID, EPIDURAL, L5-S1 IL NEED CONSENT;  Surgeon: Tj Mayfield MD;  Location: [facility]IN [facility]GT;  Service: Pain Management;  Laterality: N/A;    EPIDURAL STEROID INJECTION N/A 9/29/2022    Procedure: LUMBAR L3/L4 IL MADELINE CONTRAST;  Surgeon: Tj Mayfield MD;  Location: [facility]IN [facility]GT;  Service: Pain Management;  Laterality: N/A;    EPIDURAL STEROID INJECTION N/A 12/12/2022    Procedure: INJECTION, STEROID, EPIDURAL L5/S1 IL CONTRAST;  Surgeon: Tj Mayfield MD;  Location: [facility];  Service: Pain Management;  Laterality: N/A;    EPIDURAL STEROID INJECTION N/A 4/6/2023    Procedure: INJECTION, STEROID, EPIDURAL L5/S1;  Surgeon: Tj Mayfield MD;  Location: [facility];  Service: Pain Management;  Laterality: N/A;    FOOT ARTHRODESIS Left 1/11/2023    Procedure: FUSION, FOOT;  Surgeon: Ariella Finnegan MD;  Location: [facility] OR;  Service: Orthopedics;  Laterality: Left;  nimbus    HARVESTING OF BONE GRAFT Left 1/11/2023    Procedure: SURGICAL PROCUREMENT, BONE GRAFT;  Surgeon: Ariella Finnegan MD;  Location: [facility] OR;  Service: Orthopedics;  Laterality: Left;    INJECTION OF ANESTHETIC AGENT AROUND NERVE Bilateral 8/23/2021    Procedure: BLOCK, NERVE, MEDIAL BRANCH L3,L4,L5;  Surgeon: Tj Mayfield MD;  Location: [facility];  Service: Pain Management;  Laterality: Bilateral;  1 of 2     INJECTION OF ANESTHETIC AGENT AROUND NERVE Bilateral 8/26/2021    Procedure: BLOCK, NERVE, MEDIAL BRANCH L3,L4,L5;  Surgeon: Tj Mayfield MD;  Location: [facility];  Service: Pain Management;  Laterality: Bilateral;  2 of 2    INJECTION OF ANESTHETIC AGENT AROUND NERVE Left 7/7/2022    Procedure: BLOCK, NERVE, LEFT OBTURATOR AND FEMORAL;  Surgeon: Tj Mayfield MD;  Location: [facility] PAIN [facility]GT;  Service: Pain Management;  Laterality: Left;    INJECTION OF FACET JOINT Bilateral 3/12/2020    Procedure: FACET JOINT INJECTION (LUMBAR BLOCK) BILATERAL L4-5 AND L5-S1 DIRECT REFERRAL;  Surgeon: Tj Mayfield MD;  Location: [facility];  Service: Pain Management;  Laterality: Bilateral;  NEEDS CONSENT    INJECTION OF FACET JOINT Bilateral 3/29/2021    Procedure: INJECTION, FACET JOINT L3/4, L4/5, L5/S1;  Surgeon: Tj Mayfield MD;  Location: [facility];  Service: Pain Management;  Laterality: Bilateral;    INJECTION OF JOINT Right 7/30/2020    Procedure: INJECTION, JOINT, RIGHT HIP Interarticular under flouro;  Surgeon: Tj Mayfield MD;  Location: [facility];  Service: Pain Management;  Laterality: Right;  INJECTION, JOINT, RIGHT HIP Interarticular under flouro    INJECTION OF JOINT Right 2/21/2022    Procedure: Injection, Joint RIGHT ISCHIAL BURSA;  Surgeon: Tj Mayfield MD;  Location: [facility];  Service: Pain Management;  Laterality: Right;    INTRAMEDULLARY RODDING OF FEMUR Left 5/21/2019    Procedure: INSERTION, INTRAMEDULLARY ARIEL, FEMUR;  Surgeon: López Duff MD;  [facility];  Service: Orthopedics;  Laterality: Left;    LENGTHENING OF TENDON OF LOWER EXTREMITY Left 1/11/2023    Procedure: LENGTHENING, TENDON, LOWER EXTREMITY;  Surgeon: Ariella Finnegan MD;  Location: [facility] OR;  Service: Orthopedics;  Laterality: Left;    RADIOFREQUENCY ABLATION Left 9/20/2021    Procedure: RADIOFREQUENCY ABLATION left L3,4,5 RFA  1 of 2  CONSENT NEEDED;  Surgeon: Tj Mayfield MD;  Location: [facility];   Service: Pain Management;  Laterality: Left;    RADIOFREQUENCY ABLATION Right 10/4/2021    Procedure: RADIOFREQUENCY ABLATION  right L3,4,5 RFA   2 of 2  CONSENT NEEDED;  Surgeon: Tj Mayfield MD;  Location: [facility]IN [facility]GT;  Service: Pain Management;  Laterality: Right;    RADIOFREQUENCY ABLATION Left 4/21/2022    Procedure: Radiofrequency Ablation LEFT L3,L4,L5;  Surgeon: Tj Mayfield MD;  Location: [facility];  Service: Pain Management;  Laterality: Left;    RADIOFREQUENCY ABLATION Right 5/12/2022    Procedure: Radiofrequency Ablation RIGHT L3,L4,L5;  Surgeon: Tj Mayfield MD;  Location: [facility];  Service: Pain Management;  Laterality: Right;    RADIOFREQUENCY ABLATION Left 7/25/2022    Procedure: Radiofrequency Ablation Left Femoral & Obturator;  Surgeon: Tj Mayfield MD;  Location: [facility]GT;  Service: Pain Management;  Laterality: Left;    REPAIR OF TRICEPS TENDON Left 10/17/2018    Procedure: REPAIR, TENDON, TRICEPS left elbow;  Surgeon: Staci Yarbrough MD;  Location: [facility] OR [address] [place] [address];  Service: Orthopedics;  Laterality: Left;  Anesthesia: General and regional. PRONE, k-wire , hand pan 1 and pan 2, CALL ARTHREX/Tamera notified 10-12 LO    TONSILLECTOMY      TRANSFORAMINAL EPIDURAL INJECTION OF STEROID Right 8/31/2020    Procedure: INJECTION, STEROID, EPIDURAL, TRANSFORAMINAL,  APPROACH, L3-L4 and L4-L5 need consent;  Surgeon: Tj Mayfield MD;  Location: [facility];  Service: Pain Management;  Laterality: Right;    TRANSFORAMINAL EPIDURAL INJECTION OF STEROID Bilateral 7/23/2021    Procedure: INJECTION, STEROID, EPIDURAL, TRANSFORAMINAL APPROACH L4/5;  Surgeon: Tj Mafyield MD;  Location: [facility];  Service: Pain Management;  Laterality: Bilateral;    TRIGGER POINT INJECTION N/A 7/23/2021    Procedure: INJECTION, TRIGGER POINT SCAPULAR;  Surgeon: Tj Mayfield MD;  Location: [facility];  Service: Pain Management;  Laterality: N/A;    TUBAL LIGATION  2003    UPPER  GASTROINTESTINAL ENDOSCOPY      10/11    uterine ablation  2003       Family History   Problem Relation Age of Onset    Cancer Mother         Lung Cancer    Emphysema Mother     Heart attack Mother     Alcohol abuse Mother     Cancer Father         Lung Cancer    Osteoarthritis Father     Lung cancer Father     Skin cancer Father     Alcohol abuse Father     Heart disease Brother     Heart attack Brother     Alcohol abuse Brother     Cirrhosis Brother     Osteoarthritis Paternal Aunt     Retinal detachment Maternal Aunt     No Known Problems Sister     No Known Problems Maternal Uncle     No Known Problems Paternal Uncle     No Known Problems Maternal Grandmother     No Known Problems Maternal Grandfather     No Known Problems Paternal Grandmother     No Known Problems Paternal Grandfather     Colon cancer Neg Hx     Esophageal cancer Neg Hx     Stomach cancer Neg Hx     Celiac disease Neg Hx     Diabetes Neg Hx     Thyroid disease Neg Hx     Amblyopia Neg Hx     Blindness Neg Hx     Cataracts Neg Hx     Glaucoma Neg Hx     Hypertension Neg Hx     Macular degeneration Neg Hx     Strabismus Neg Hx     Stroke Neg Hx        Social History     Socioeconomic History    Marital status:    Tobacco Use    Smoking status: Never    Smokeless tobacco: Never   Substance and Sexual Activity    Alcohol use: Yes     Comment: 2-3 times per year.    Drug use: No    Sexual activity: Yes     Partners: Male     Birth control/protection: Surgical     Social Determinants of Health     Financial Resource Strain: Low Risk     Difficulty of Paying Living Expenses: Not hard at all   Food Insecurity: Unknown    Worried About Running Out of Food in the Last Year: Patient refused    Ran Out of Food in the Last Year: Patient refused   Transportation Needs: Unknown    Lack of Transportation (Medical): Patient refused    Lack of Transportation (Non-Medical): Patient refused   Physical Activity: Unknown    Days of Exercise per Week: 2 days    Stress: No Stress Concern Present    Feeling of Stress : Not at all   Social Connections: Unknown    Frequency of Communication with Friends and Family: Twice a week    Frequency of Social Gatherings with Friends and Family: Twice a week    Active Member of Clubs or Organizations: Yes    Attends Club or Organization Meetings: Patient refused    Marital Status:    Housing Stability: Unknown    Unable to Pay for Housing in the Last Year: No    Unstable Housing in the Last Year: No

## 2023-05-12 LAB — BACTERIA SPEC AEROBE CULT: ABNORMAL

## 2023-05-13 ENCOUNTER — PATIENT MESSAGE (OUTPATIENT)
Dept: PAIN MEDICINE | Facility: CLINIC | Age: 63
End: 2023-05-13
Payer: MEDICARE

## 2023-05-13 ENCOUNTER — PATIENT MESSAGE (OUTPATIENT)
Dept: PAIN MEDICINE | Facility: OTHER | Age: 63
End: 2023-05-13
Payer: MEDICARE

## 2023-05-14 ENCOUNTER — PATIENT MESSAGE (OUTPATIENT)
Dept: SPORTS MEDICINE | Facility: CLINIC | Age: 63
End: 2023-05-14
Payer: MEDICARE

## 2023-05-15 ENCOUNTER — PATIENT MESSAGE (OUTPATIENT)
Dept: PHARMACY | Facility: CLINIC | Age: 63
End: 2023-05-15
Payer: MEDICARE

## 2023-05-15 ENCOUNTER — OFFICE VISIT (OUTPATIENT)
Dept: PAIN MEDICINE | Facility: CLINIC | Age: 63
End: 2023-05-15
Payer: MEDICARE

## 2023-05-15 ENCOUNTER — HOSPITAL ENCOUNTER (OUTPATIENT)
Dept: RADIOLOGY | Facility: HOSPITAL | Age: 63
Discharge: HOME | End: 2023-05-15
Attending: NURSE PRACTITIONER
Payer: MEDICARE

## 2023-05-15 ENCOUNTER — PATIENT MESSAGE (OUTPATIENT)
Dept: PAIN MEDICINE | Facility: OTHER | Age: 63
End: 2023-05-15
Payer: MEDICARE

## 2023-05-15 VITALS
TEMPERATURE: 98 F | WEIGHT: 138.88 LBS | RESPIRATION RATE: 18 BRPM | DIASTOLIC BLOOD PRESSURE: 83 MMHG | HEART RATE: 106 BPM | HEIGHT: 61 IN | BODY MASS INDEX: 26.22 KG/M2 | SYSTOLIC BLOOD PRESSURE: 145 MMHG

## 2023-05-15 DIAGNOSIS — M47.816 SPONDYLOSIS OF LUMBAR REGION WITHOUT MYELOPATHY OR RADICULOPATHY: ICD-10-CM

## 2023-05-15 DIAGNOSIS — S73.191D TEAR OF RIGHT ACETABULAR LABRUM, SUBSEQUENT ENCOUNTER: ICD-10-CM

## 2023-05-15 DIAGNOSIS — M25.551 PAIN OF RIGHT HIP: ICD-10-CM

## 2023-05-15 DIAGNOSIS — M16.12 OSTEOARTHRITIS OF LEFT HIP, UNSPECIFIED OSTEOARTHRITIS TYPE: ICD-10-CM

## 2023-05-15 DIAGNOSIS — M25.551 PAIN OF RIGHT HIP: Primary | ICD-10-CM

## 2023-05-15 DIAGNOSIS — M79.18 MYOFASCIAL PAIN: ICD-10-CM

## 2023-05-15 DIAGNOSIS — G89.4 CHRONIC PAIN SYNDROME: ICD-10-CM

## 2023-05-15 DIAGNOSIS — M25.552 LEFT HIP PAIN: ICD-10-CM

## 2023-05-15 PROCEDURE — 99214 OFFICE O/P EST MOD 30 MIN: CPT | Mod: S$GLB,,, | Performed by: NURSE PRACTITIONER

## 2023-05-15 PROCEDURE — 99999 PR PBB SHADOW E&M-EST. PATIENT-LVL V: ICD-10-PCS | Mod: PBBFAC,,, | Performed by: NURSE PRACTITIONER

## 2023-05-15 PROCEDURE — 73721 MRI JNT OF LWR EXTRE W/O DYE: CPT | Mod: TC,RT

## 2023-05-15 PROCEDURE — 3077F SYST BP >= 140 MM HG: CPT | Mod: CPTII,S$GLB,, | Performed by: NURSE PRACTITIONER

## 2023-05-15 PROCEDURE — 73721 MRI JNT OF LWR EXTRE W/O DYE: CPT | Mod: 26,RT,, | Performed by: RADIOLOGY

## 2023-05-15 PROCEDURE — 99999 PR PBB SHADOW E&M-EST. PATIENT-LVL V: CPT | Mod: PBBFAC,,, | Performed by: NURSE PRACTITIONER

## 2023-05-15 PROCEDURE — 3077F PR MOST RECENT SYSTOLIC BLOOD PRESSURE >= 140 MM HG: ICD-10-PCS | Mod: CPTII,S$GLB,, | Performed by: NURSE PRACTITIONER

## 2023-05-15 PROCEDURE — 1160F PR REVIEW ALL MEDS BY PRESCRIBER/CLIN PHARMACIST DOCUMENTED: ICD-10-PCS | Mod: CPTII,S$GLB,, | Performed by: NURSE PRACTITIONER

## 2023-05-15 PROCEDURE — 3079F PR MOST RECENT DIASTOLIC BLOOD PRESSURE 80-89 MM HG: ICD-10-PCS | Mod: CPTII,S$GLB,, | Performed by: NURSE PRACTITIONER

## 2023-05-15 PROCEDURE — 73721 MRI HIP WITHOUT CONTRAST RIGHT: ICD-10-PCS | Mod: 26,RT,, | Performed by: RADIOLOGY

## 2023-05-15 PROCEDURE — 1160F RVW MEDS BY RX/DR IN RCRD: CPT | Mod: CPTII,S$GLB,, | Performed by: NURSE PRACTITIONER

## 2023-05-15 PROCEDURE — 1159F PR MEDICATION LIST DOCUMENTED IN MEDICAL RECORD: ICD-10-PCS | Mod: CPTII,S$GLB,, | Performed by: NURSE PRACTITIONER

## 2023-05-15 PROCEDURE — 3008F BODY MASS INDEX DOCD: CPT | Mod: CPTII,S$GLB,, | Performed by: NURSE PRACTITIONER

## 2023-05-15 PROCEDURE — 3008F PR BODY MASS INDEX (BMI) DOCUMENTED: ICD-10-PCS | Mod: CPTII,S$GLB,, | Performed by: NURSE PRACTITIONER

## 2023-05-15 PROCEDURE — 99214 PR OFFICE/OUTPT VISIT, EST, LEVL IV, 30-39 MIN: ICD-10-PCS | Mod: S$GLB,,, | Performed by: NURSE PRACTITIONER

## 2023-05-15 PROCEDURE — 1159F MED LIST DOCD IN RCRD: CPT | Mod: CPTII,S$GLB,, | Performed by: NURSE PRACTITIONER

## 2023-05-15 PROCEDURE — 3079F DIAST BP 80-89 MM HG: CPT | Mod: CPTII,S$GLB,, | Performed by: NURSE PRACTITIONER

## 2023-05-15 RX ORDER — DIAZEPAM 10 MG/1
10 TABLET ORAL ONCE
Qty: 1 TABLET | Refills: 0 | Status: SHIPPED | OUTPATIENT
Start: 2023-05-15 | End: 2023-06-08 | Stop reason: CLARIF

## 2023-05-15 RX ORDER — ALPRAZOLAM 0.5 MG/1
TABLET ORAL
Qty: 1 TABLET | Refills: 0 | Status: SHIPPED | OUTPATIENT
Start: 2023-05-15 | End: 2023-06-08 | Stop reason: CLARIF

## 2023-05-15 NOTE — PROGRESS NOTES
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 Status   05/09/2023 137 136 - 145 mmol/L Final     Potassium   Date Value Ref Range Status   05/09/2023 3.8 3.5 - 5.1 mmol/L Final     Chloride   Date Value Ref Range Status   05/09/2023 106 95 - 110 mmol/L Final     CO2   Date Value Ref Range Status   05/09/2023 26 23 - 29 mmol/L Final     Glucose   Date Value Ref Range Status   05/09/2023 120 (H) 70 - 110 mg/dL Final     BUN   Date Value Ref Range Status   05/09/2023 14 7 - 17 mg/dL Final     Creatinine   Date Value Ref Range Status   05/09/2023 0.86 0.50 - 1.40 mg/dL Final     Calcium   Date Value Ref Range Status   05/09/2023 8.9 8.7 - 10.5 mg/dL Final     Total Protein   Date Value Ref Range Status   05/09/2023 6.5 6.0 - 8.4 g/dL Final     Albumin   Date Value Ref Range Status   05/09/2023 4.1 3.5 - 5.2 g/dL Final     Total Bilirubin   Date Value Ref Range Status   05/09/2023 0.5 0.1 - 1.0 mg/dL Final     Comment:     For infants and newborns, interpretation of results should be based  on gestational age, weight and in agreement with clinical  observations.    Premature Infant recommended reference ranges:  Up to 24 hours.............<8.0 mg/dL  Up to 48 hours............<12.0 mg/dL  3-5 days..................<15.0 mg/dL  6-29 days.................<15.0 mg/dL       Alkaline Phosphatase   Date Value Ref Range Status   05/09/2023 80 38 - 126 U/L Final     AST   Date Value Ref Range Status   05/09/2023 28 15 - 46  "U/L Final     ALT   Date Value Ref Range Status   05/09/2023 24 10 - 44 U/L Final     Anion Gap   Date Value Ref Range Status   05/09/2023 5 (L) 8 - 16 mmol/L Final     eGFR if    Date Value Ref Range Status   06/20/2022 >60.0 >60 mL/min/1.73 m^2 Final     eGFR if non    Date Value Ref Range Status   06/20/2022 >60.0 >60 mL/min/1.73 m^2 Final     Comment:     Calculation used to obtain the estimated glomerular filtration  rate (eGFR) is the CKD-EPI equation.            Allergies:   Review of patient's allergies indicates:   Allergen Reactions    Actemra [tocilizumab] Other (See Comments)     Severe dizziness    Codeine Nausea And Vomiting and Hives    Gold au 198 Hives and Rash    Hydroxychloroquine Other (See Comments)     Can't remember the reaction      Iodinated contrast media Other (See Comments)     Other reaction(s): BURNING ALL OVER    Iodine Other (See Comments) and Hives     Other reaction(s): BURNING ALL OVER - Iodine dye - Not topical    Ondansetron Nausea And Vomiting    Sulfa (sulfonamide antibiotics) Other (See Comments)     Can't remember the reaction    Zofran [ondansetron hcl (pf)] Nausea And Vomiting     Pt reports that last time she received zofran she started vomiting again    Methotrexate analogues Nausea Only    Pneumococcal vaccine Other (See Comments)    Pneumovax 23 [pneumococcal 23-argenis ps vaccine] Other (See Comments)     "sick"    Methotrexate Nausea Only       Current Medications:   Current Outpatient Medications   Medication Sig Dispense Refill    albuterol (PROVENTIL/VENTOLIN HFA) 90 mcg/actuation inhaler Inhale 2 puffs into the lungs every 6 (six) hours as needed. Rescue 20.1 g 11    ALPRAZolam (XANAX) 0.5 MG tablet Take Once on call to procedure 1 tablet 0    budesonide-formoterol 160-4.5 mcg (SYMBICORT) 160-4.5 mcg/actuation HFAA Inhale 2 puffs into the lungs every 12 (twelve) hours. 30.6 g 3    calcium citrate-vitamin D3 315-200 mg (CITRACAL+D) 315 " mg-5 mcg (200 unit) per tablet Take 1 tablet by mouth 2 (two) times daily.       cycloSPORINE (RESTASIS) 0.05 % ophthalmic emulsion Instill one drop into both eyes two times per day 180 each 3    diazePAM (VALIUM) 2 MG tablet Take one tablet by mouth 30 minutes prior to MRI. Ok to take second tablet right before MRI if you continue to feel anxious. 2 tablet 0    diclofenac sodium (VOLTAREN) 1 % Gel APPLY 2 GRAMS TOPICALLY 4 (FOUR) TIMES DAILY AS NEEDED. 300 g 2    estrogens, conjugated, (PREMARIN) 0.625 MG tablet take 1 tablet by mouth daily 90 tablet 4    fluconazole (DIFLUCAN) 100 MG tablet Take 1 tablet by mouth once daily for 14 days. 14 tablet 0    fluconazole (DIFLUCAN) 150 MG Tab Take 150 mg by mouth as needed.      halobetasol propion-tazarotene (DUOBRII) 0.01-0.045 % Lotn aaa on feet and hands qhs  then vaseline and warm towel (Patient taking differently: aaa on feet and hands qhs  then vaseline and warm towel) 100 g 3    INV PROPULSID 10 MG Take 2 tablets (20 mg) by mouth 4 (four) times daily. FOR INVESTIGATIONAL USE ONLY. Protocol [id number]  Subject ID: [id number]. 1440 each 0    ketoconazole (NIZORAL) 2 % cream Apply to feet twice daily for 3 weeks (Patient taking differently: Apply to feet twice daily for 3 weeks) 60 g 3    leflunomide (ARAVA) 20 MG Tab Take 1 tablet (20 mg total) by mouth once daily. 90 tablet 0    lifitegrast (XIIDRA) 5 % Dpet INSTILL ONE DROP INTO BOTH EYES TWICE A DAY 60 mL 10    magic mouthwash diphen/antac/lidoc Swish and Spit 5 mL by mouth for 30 seconds twice daily. 150 mL 0    methenamine (HIPREX) 1 gram Tab Take 1 tablet (1 g total) by mouth 2 (two) times daily. 180 tablet 3    methocarbamoL (ROBAXIN) 750 MG Tab Take 1 tablet (750 mg total) by mouth 3 (three) times daily. 90 tablet 0    mirabegron (MYRBETRIQ) 25 mg Tb24 ER tablet Take 1 tablet (25 mg total) by mouth once daily. 90 tablet 0    montelukast (SINGULAIR) 10 mg tablet Take 1 tablet (10 mg total) by mouth every  evening. 90 tablet 3    naproxen (NAPROSYN) 500 MG tablet Take 1 tablet (500 mg total) by mouth 2 (two) times daily as needed (prn). 180 tablet 1    omeprazole (PRILOSEC) 40 MG capsule Take 1 capsule (40 mg total) by mouth every morning. 30 capsule 6    omeprazole-sodium bicarbonate (ZEGERID) 40-1.1 mg-gram per capsule TAKE 1 CAPSULE BY MOUTH EVERY DAY IN THE MORNING 90 capsule 3    POTASSIUM ORAL Take by mouth once daily.      predniSONE (DELTASONE) 1 MG tablet TAKE 5 TABLETS (5 MG TOTAL) BY MOUTH ONCE DAILY. 450 tablet 1    predniSONE (DELTASONE) 5 MG tablet Take 1 tablet (5 mg total) by mouth once daily. 90 tablet 1    pregabalin (LYRICA) 50 MG capsule Take 3 capsules (150 mg total) by mouth every evening. 270 capsule 1    PREMARIN vaginal cream PLACE 0.5 GRAM VAGINALLY 3 (THREE) TIMES A WEEK. 30 g 1    progesterone (PROMETRIUM) 100 MG capsule Take 1 capsule by mouth daily. 90 capsule 1    progesterone (PROMETRIUM) 100 MG capsule take 1 capsule by mouth daily 90 capsule 4    promethazine (PHENERGAN) 25 MG tablet Take 1 tablet (25 mg total) by mouth every 6 (six) hours as needed for Nausea. 30 tablet 1    promethazine (PHENERGAN) 25 MG tablet Take 1 tablet (25 mg total) by mouth every 6 (six) hours as needed for Nausea. 15 tablet 0    rosuvastatin (CRESTOR) 20 MG tablet Take 1 tablet (20 mg total) by mouth every evening. 90 tablet 3    sarilumab (KEVZARA) 200 mg/1.14 mL Syrg Inject 1.14 mL (200 mg total) into the skin every 14 (fourteen) days. 2.28 mL 0    sucralfate (CARAFATE) 1 gram tablet TAKE 1 TABLET (1 G TOTAL) BY MOUTH 4 (FOUR) TIMES DAILY. 360 tablet 2    sucralfate (CARAFATE) 1 gram tablet Take 1 tablet (1 g total) by mouth 4 (four) times daily. 360 tablet 3    traMADoL (ULTRAM) 50 mg tablet Take 1 tablet (50 mg total) by mouth every 12 (twelve) hours as needed for Pain. 60 tablet 0    albuterol-ipratropium (DUO-NEB) 2.5 mg-0.5 mg/3 mL nebulizer solution Take 3 mLs by nebulization every 6 (six) hours as  needed for Wheezing. Rescue 90 mL 3    aspirin 325 MG tablet Take 1 tablet (325 mg total) by mouth once daily. 30 tablet 0     No current facility-administered medications for this visit.       REVIEW OF SYSTEMS:    GENERAL:  No weight loss, malaise or fevers.  HEENT:  Negative for frequent or significant headaches.  NECK:  Negative for lumps, goiter, pain and significant neck swelling.  RESPIRATORY:  Negative for cough, wheezing or shortness of breath. Asthma.  CARDIOVASCULAR:  Negative for chest pain, leg swelling or palpitations. CAD.  GI:  Negative for abdominal discomfort, blood in stools or black stools or change in bowel habits.  MUSCULOSKELETAL:  See HPI.  SKIN:  Negative for lesions, rash, and itching.  PSYCH:  Negative for sleep disturbance, mood disorder and recent psychosocial stressors.  HEMATOLOGY/LYMPHOLOGY:  Negative for prolonged bleeding, bruising easily or swollen nodes.  NEURO:   No history of headaches, syncope, paralysis, seizures or tremors.  All other reviewed and negative other than HPI.    Past Medical History:  Past Medical History:   Diagnosis Date    Acid reflux     Allergy     Anemia     Asthma     Coronary artery disease     CS (cervical spondylosis) 03/08/2013    Degenerative disc disease     Degenerative joint disease of hindfoot, left 6/28/2022    Dry eyes     Dry mouth     Gastroparesis     History of methotrexate therapy 01/19/2022    Hyperlipidemia     Lateral meniscus derangement 04/06/2016    Lobular carcinoma in situ     Lumbar spondylosis 03/08/2013    Osteoarthritis     Osteoporosis     Pes planovalgus, acquired, left 6/28/2022    Rheumatoid arthritis(714.0)     Rupture of left triceps tendon 10/17/2018    Umbilical hernia 08/13/2015       Past Surgical History:  Past Surgical History:   Procedure Laterality Date    BREAST BIOPSY Left 01/29/2002    core bx    CARPAL TUNNEL RELEASE Right 05/2017    CHOLECYSTECTOMY  2004    COLONOSCOPY      10/11    COLONOSCOPY N/A 6/29/2017     Procedure: COLONOSCOPY;  Surgeon: López Moore MD;  Location: [facility] (4TH FLR);  Service: Endoscopy;  Laterality: N/A;    EPIDURAL STEROID INJECTION N/A 11/18/2021    Procedure: INJECTION, STEROID, EPIDURAL, L5-S1 IL NEED CONSENT;  Surgeon: Tj Mayfield MD;  Location: [facility] PAIN MGT;  Service: Pain Management;  Laterality: N/A;    EPIDURAL STEROID INJECTION N/A 9/29/2022    Procedure: LUMBAR L3/L4 IL MADELINE CONTRAST;  Surgeon: Tj Mayfield MD;  Location: [facility] PAIN MGT;  Service: Pain Management;  Laterality: N/A;    EPIDURAL STEROID INJECTION N/A 12/12/2022    Procedure: INJECTION, STEROID, EPIDURAL L5/S1 IL CONTRAST;  Surgeon: Tj Mayfield MD;  Location: [facility] PAIN MGT;  Service: Pain Management;  Laterality: N/A;    EPIDURAL STEROID INJECTION N/A 4/6/2023    Procedure: INJECTION, STEROID, EPIDURAL L5/S1;  Surgeon: Tj Mayfield MD;  Location: [facility] PAIN MGT;  Service: Pain Management;  Laterality: N/A;    FOOT ARTHRODESIS Left 1/11/2023    Procedure: FUSION, FOOT;  Surgeon: Ariella Finnegan MD;  Location: [facility] OR;  Service: Orthopedics;  Laterality: Left;  [facility]    HARVESTING OF BONE GRAFT Left 1/11/2023    Procedure: SURGICAL PROCUREMENT, BONE GRAFT;  Surgeon: Ariella Finnegan MD;  Location: [facility] OR;  Service: Orthopedics;  Laterality: Left;    INJECTION OF ANESTHETIC AGENT AROUND NERVE Bilateral 8/23/2021    Procedure: BLOCK, NERVE, MEDIAL BRANCH L3,L4,L5;  Surgeon: Tj Mayfield MD;  Location: [facility] PAIN MGT;  Service: Pain Management;  Laterality: Bilateral;  1 of 2    INJECTION OF ANESTHETIC AGENT AROUND NERVE Bilateral 8/26/2021    Procedure: BLOCK, NERVE, MEDIAL BRANCH L3,L4,L5;  Surgeon: Tj Mayfield MD;  Location: [facility] PAIN MGT;  Service: Pain Management;  Laterality: Bilateral;  2 of 2    INJECTION OF ANESTHETIC AGENT AROUND NERVE Left 7/7/2022    Procedure: BLOCK, NERVE, LEFT OBTURATOR AND FEMORAL;  Surgeon: Tj Mayfield MD;  Location: [facility] PAIN MGT;  Service: Pain Management;  Laterality: Left;     INJECTION OF FACET JOINT Bilateral 3/12/2020    Procedure: FACET JOINT INJECTION (LUMBAR BLOCK) BILATERAL L4-5 AND L5-S1 DIRECT REFERRAL;  Surgeon: Tj Mayfield MD;  Location: [facility] PAIN MGT;  Service: Pain Management;  Laterality: Bilateral;  NEEDS CONSENT    INJECTION OF FACET JOINT Bilateral 3/29/2021    Procedure: INJECTION, FACET JOINT L3/4, L4/5, L5/S1;  Surgeon: Tj Mayfield MD;  Location: [facility] PAIN MGT;  Service: Pain Management;  Laterality: Bilateral;    INJECTION OF JOINT Right 7/30/2020    Procedure: INJECTION, JOINT, RIGHT HIP Interarticular under flouro;  Surgeon: Tj Mayfield MD;  Location: [facility]IN [facility]GT;  Service: Pain Management;  Laterality: Right;  INJECTION, JOINT, RIGHT HIP Interarticular under flouro    INJECTION OF JOINT Right 2/21/2022    Procedure: Injection, Joint RIGHT ISCHIAL BURSA;  Surgeon: Tj Mayfield MD;  Location: [facility] PAIN MGT;  Service: Pain Management;  Laterality: Right;    INTRAMEDULLARY RODDING OF FEMUR Left 5/21/2019    Procedure: INSERTION, INTRAMEDULLARY ARIEL, FEMUR;  Surgeon: López Duff MD;  [address]  Service: [address]opedics;  Laterality: Left;    LENGTHENING OF TENDON OF LOWER EXTREMITY Left 1/11/2023    Procedure: LENGTHENING, TENDON, LOWER EXTREMITY;  Surgeon: Ariella Finnegan MD;  Location: [facility] OR;  Service: Orthopedics;  Laterality: Left;    RADIOFREQUENCY ABLATION Left 9/20/2021    Procedure: RADIOFREQUENCY ABLATION left L3,4,5 RFA  1 of 2  CONSENT NEEDED;  Surgeon: Tj Mayfield MD;  Location: [facility] PAIN MGT;  Service: Pain Management;  Laterality: Left;    RADIOFREQUENCY ABLATION Right 10/4/2021    Procedure: RADIOFREQUENCY ABLATION  right L3,4,5 RFA   2 of 2  CONSENT NEEDED;  Surgeon: Tj Mayfield MD;  Location: [facility] PAIN MGT;  Service: Pain Management;  Laterality: Right;    RADIOFREQUENCY ABLATION Left 4/21/2022    Procedure: Radiofrequency Ablation LEFT L3,L4,L5;  Surgeon: Tj Mayfield MD;  Location: [facility] PAIN MGT;  Service: Pain Management;   Laterality: Left;    RADIOFREQUENCY ABLATION Right 5/12/2022    Procedure: Radiofrequency Ablation RIGHT L3,L4,L5;  Surgeon: Tj Mayfield MD;  Location: [facility] PAIN MGT;  Service: Pain Management;  Laterality: Right;    RADIOFREQUENCY ABLATION Left 7/25/2022    Procedure: Radiofrequency Ablation Left Femoral & Obturator;  Surgeon: Tj Mayfield MD;  Location: [facility] PAIN MGT;  Service: Pain Management;  Laterality: Left;    REPAIR OF TRICEPS TENDON Left 10/17/2018    Procedure: REPAIR, TENDON, TRICEPS left elbow;  Surgeon: Staci Yarbrough MD;  Location: [facility] OR 1ST FLR;  Service: Orthopedics;  Laterality: Left;  Anesthesia: General and regional. PRONE, k-wire , hand pan 1 and pan 2, CALL ARTHREX/Tamera notified 10-12 LO    TONSILLECTOMY      TRANSFORAMINAL EPIDURAL INJECTION OF STEROID Right 8/31/2020    Procedure: INJECTION, STEROID, EPIDURAL, TRANSFORAMINAL,  APPROACH, L3-L4 and L4-L5 need consent;  Surgeon: Tj Mayfield MD;  Location: [facility] PAIN MGT;  Service: Pain Management;  Laterality: Right;    TRANSFORAMINAL EPIDURAL INJECTION OF STEROID Bilateral 7/23/2021    Procedure: INJECTION, STEROID, EPIDURAL, TRANSFORAMINAL APPROACH L4/5;  Surgeon: Tj Mayfield MD;  Location: [facility] PAIN MGT;  Service: Pain Management;  Laterality: Bilateral;    TRIGGER POINT INJECTION N/A 7/23/2021    Procedure: INJECTION, TRIGGER POINT SCAPULAR;  Surgeon: Tj Mayfield MD;  Location: [facility] PAIN MGT;  Service: Pain Management;  Laterality: N/A;    TUBAL LIGATION  2003    UPPER GASTROINTESTINAL ENDOSCOPY      10/11    uterine ablation  2003       Family History:  Family History   Problem Relation Age of Onset    Cancer Mother         Lung Cancer    Emphysema Mother     Heart attack Mother     Alcohol abuse Mother     Cancer Father         Lung Cancer    Osteoarthritis Father     Lung cancer Father     Skin cancer Father     Alcohol abuse Father     Heart disease Brother     Heart attack Brother     Alcohol abuse Brother      Cirrhosis Brother     Osteoarthritis Paternal Aunt     Retinal detachment Maternal Aunt     No Known Problems Sister     No Known Problems Maternal Uncle     No Known Problems Paternal Uncle     No Known Problems Maternal Grandmother     No Known Problems Maternal Grandfather     No Known Problems Paternal Grandmother     No Known Problems Paternal Grandfather     Colon cancer Neg Hx     Esophageal cancer Neg Hx     Stomach cancer Neg Hx     Celiac disease Neg Hx     Diabetes Neg Hx     Thyroid disease Neg Hx     Amblyopia Neg Hx     Blindness Neg Hx     Cataracts Neg Hx     Glaucoma Neg Hx     Hypertension Neg Hx     Macular degeneration Neg Hx     Strabismus Neg Hx     Stroke Neg Hx        Social History:  Social History     Socioeconomic History    Marital status:    Tobacco Use    Smoking status: Never    Smokeless tobacco: Never   Substance and Sexual Activity    Alcohol use: Yes     Comment: 2-3 times per year.    Drug use: No    Sexual activity: Yes     Partners: Male     Birth control/protection: Surgical     Social Determinants of Health     Financial Resource Strain: Low Risk     Difficulty of Paying Living Expenses: Not hard at all   Food Insecurity: Unknown    Worried About Running Out of Food in the Last Year: Patient refused    Ran Out of Food in the Last Year: Patient refused   Transportation Needs: Unknown    Lack of Transportation (Medical): Patient refused    Lack of Transportation (Non-Medical): Patient refused   Physical Activity: Unknown    Days of Exercise per Week: 2 days   Stress: No Stress Concern Present    Feeling of Stress : Not at all   Social Connections: Unknown    Frequency of Communication with Friends and Family: Twice a week    Frequency of Social Gatherings with Friends and Family: Twice a week    Active Member of Clubs or Organizations: Yes    Attends Club or Organization Meetings: Patient refused    Marital Status:    Housing Stability: Unknown    Unable to Pay for  "Housing in the Last Year: No    Unstable Housing in the Last Year: No       OBJECTIVE:  BP (!) 145/83   Pulse 106   Temp 98.3 °F (36.8 °C)   Resp 18   Ht 5' 1" (1.549 m)   Wt 63 kg (138 lb 14.2 oz)   LMP  (LMP Unknown)   BMI 26.24 kg/m²       PHYSICAL EXAMINATION:    General appearance: Well appearing, in no acute distress, alert.  Psych:  Mood and affect appropriate.  Back:  No TTP over lumbar facet joints bilaterally. Limited ROM with pain on lumbar extension and flexion.  Facet loading bilaterally. Negative SLR bilaterally.   MSK:  TTP over right gluteal tendon and anterolateral hip. Pain with external rotation of right hip. Weston's is positive on the right. 5/5 strength in right ankle with plantar and dorsiflexion. 5/5 strength in left ankle with plantar and dorsiflexion. 5/5 strength with right knee flexion and extension. 5/5 strength with left knee flexion and extension. Right hip flexion is 4/5, left is 5/5.  Neuro: No loss of sensation.   Gait: Antalgic- presents in wheelchair.    ASSESSMENT: 62 y.o. year old female with lower back and hip pain, consistent with the following diagnoses:     1. Pain of right hip  MRI Hip Without Contrast Right    Procedure Order to Pain Management    CANCELED: CT Hip Without Contrast Right      2. Chronic pain syndrome        3. Myofascial pain        4. Spondylosis of lumbar region without myelopathy or radiculopathy        5. Osteoarthritis of left hip, unspecified osteoarthritis type        6. Tear of right acetabular labrum, subsequent encounter                  PLAN:      - Previous imaging was reviewed and discussed with the patient today.    - Order new right hip MRI. She previously had a right hip MRI on 4/25/23 which showed a labral tear and tendon tears. She feels as though her pain has significantly worsened since previous MRI. I am concerned for a worsened tear or a fracture that ultimately may require surgery. Will order as STAT.    - She is scheduled for " repeat right then left L3,4,5 RFAs, 2 weeks apart.    - Will plan for right hip blocks to be followed by RFA if no significant surgical findings on MRI.     - Continue current medications.    - RTC 4 week after completion of procedures.      The above plan and management options were discussed at length with patient. Patient is in agreement with the above and verbalized understanding.    Valarie Law  05/15/2023      I spent a total of 30 minutes on the day of the visit.  This includes face to face time and non-face to face time preparing to see the patient by reviewing previous labs/imaging, obtaining and/or reviewing separately obtained history, documenting clinical information in the electronic or other health record, independently interpreting results and communicating results to the patient/family/caregiver.

## 2023-05-15 NOTE — TELEPHONE ENCOUNTER
----- Message from Jason Lew sent at 5/15/2023 12:59 PM CDT -----  Regarding: Escribe needed  Contact: TONNY GOMEZ [[id number]]  Name of Who is Calling: TONNY GOMEZ [[id number]]      What is the request in detail: Would like to speak with staff in regards to Pharmacy needs an escribed prescription for Valium. They cannot take phone ins. Please advise      Can the clinic reply by [facility]: no    [facility]: [phone]

## 2023-05-16 ENCOUNTER — PATIENT MESSAGE (OUTPATIENT)
Dept: PAIN MEDICINE | Facility: CLINIC | Age: 63
End: 2023-05-16
Payer: MEDICARE

## 2023-05-16 RX ORDER — OXYCODONE AND ACETAMINOPHEN 5; 325 MG/1; MG/1
1 TABLET ORAL EVERY 8 HOURS PRN
Qty: 45 TABLET | Refills: 0 | Status: SHIPPED | OUTPATIENT
Start: 2023-05-16 | End: 2023-05-31

## 2023-05-16 NOTE — TELEPHONE ENCOUNTER
----- Message from Kala Mcclendon sent at 5/16/2023  7:50 AM CDT -----  Regarding: Results  Type:  Test Results    Who Called: TONNY GOMEZ [[id number]]    Name of Test (Lab/Mammo/Etc):  MRI     Date of Test:  5/15    Ordering Provider:  Kimani     Where the test was performed: [facility]     Best Call Back Number:  [phone]    Additional Information:  She would like a call from the provider this morning as her results were inconclusive.

## 2023-05-17 ENCOUNTER — PATIENT MESSAGE (OUTPATIENT)
Dept: FAMILY MEDICINE | Facility: CLINIC | Age: 63
End: 2023-05-17
Payer: MEDICARE

## 2023-05-17 ENCOUNTER — OFFICE VISIT (OUTPATIENT)
Dept: SPORTS MEDICINE | Facility: CLINIC | Age: 63
End: 2023-05-17
Payer: MEDICARE

## 2023-05-17 ENCOUNTER — TELEPHONE (OUTPATIENT)
Dept: SPORTS MEDICINE | Facility: CLINIC | Age: 63
End: 2023-05-17

## 2023-05-17 VITALS
HEART RATE: 100 BPM | WEIGHT: 138 LBS | BODY MASS INDEX: 26.06 KG/M2 | SYSTOLIC BLOOD PRESSURE: 138 MMHG | DIASTOLIC BLOOD PRESSURE: 81 MMHG | HEIGHT: 61 IN

## 2023-05-17 DIAGNOSIS — M17.11 PRIMARY OSTEOARTHRITIS OF RIGHT KNEE: ICD-10-CM

## 2023-05-17 DIAGNOSIS — G89.29 CHRONIC RIGHT HIP PAIN: ICD-10-CM

## 2023-05-17 DIAGNOSIS — M25.551 CHRONIC RIGHT HIP PAIN: ICD-10-CM

## 2023-05-17 DIAGNOSIS — M54.16 LUMBAR RADICULOPATHY, CHRONIC: Primary | ICD-10-CM

## 2023-05-17 DIAGNOSIS — M47.816 LUMBAR SPONDYLOSIS: ICD-10-CM

## 2023-05-17 PROCEDURE — 99999 PR PBB SHADOW E&M-EST. PATIENT-LVL V: ICD-10-PCS | Mod: PBBFAC,,, | Performed by: FAMILY MEDICINE

## 2023-05-17 PROCEDURE — 3008F PR BODY MASS INDEX (BMI) DOCUMENTED: ICD-10-PCS | Mod: CPTII,,, | Performed by: FAMILY MEDICINE

## 2023-05-17 PROCEDURE — 3075F PR MOST RECENT SYSTOLIC BLOOD PRESS GE 130-139MM HG: ICD-10-PCS | Mod: CPTII,,, | Performed by: FAMILY MEDICINE

## 2023-05-17 PROCEDURE — 1159F PR MEDICATION LIST DOCUMENTED IN MEDICAL RECORD: ICD-10-PCS | Mod: CPTII,,, | Performed by: FAMILY MEDICINE

## 2023-05-17 PROCEDURE — 99214 OFFICE O/P EST MOD 30 MIN: CPT | Mod: 25,,, | Performed by: FAMILY MEDICINE

## 2023-05-17 PROCEDURE — 3079F DIAST BP 80-89 MM HG: CPT | Mod: CPTII,,, | Performed by: FAMILY MEDICINE

## 2023-05-17 PROCEDURE — 3075F SYST BP GE 130 - 139MM HG: CPT | Mod: CPTII,,, | Performed by: FAMILY MEDICINE

## 2023-05-17 PROCEDURE — 20611 DRAIN/INJ JOINT/BURSA W/US: CPT | Mod: RT,,, | Performed by: FAMILY MEDICINE

## 2023-05-17 PROCEDURE — 99214 PR OFFICE/OUTPT VISIT, EST, LEVL IV, 30-39 MIN: ICD-10-PCS | Mod: 25,,, | Performed by: FAMILY MEDICINE

## 2023-05-17 PROCEDURE — 3008F BODY MASS INDEX DOCD: CPT | Mod: CPTII,,, | Performed by: FAMILY MEDICINE

## 2023-05-17 PROCEDURE — 99999 PR PBB SHADOW E&M-EST. PATIENT-LVL V: CPT | Mod: PBBFAC,,, | Performed by: FAMILY MEDICINE

## 2023-05-17 PROCEDURE — 1159F MED LIST DOCD IN RCRD: CPT | Mod: CPTII,,, | Performed by: FAMILY MEDICINE

## 2023-05-17 PROCEDURE — 20611 LARGE JOINT ASPIRATION/INJECTION: R HIP JOINT: ICD-10-PCS | Mod: RT,,, | Performed by: FAMILY MEDICINE

## 2023-05-17 PROCEDURE — 1160F RVW MEDS BY RX/DR IN RCRD: CPT | Mod: CPTII,,, | Performed by: FAMILY MEDICINE

## 2023-05-17 PROCEDURE — 3079F PR MOST RECENT DIASTOLIC BLOOD PRESSURE 80-89 MM HG: ICD-10-PCS | Mod: CPTII,,, | Performed by: FAMILY MEDICINE

## 2023-05-17 PROCEDURE — 1160F PR REVIEW ALL MEDS BY PRESCRIBER/CLIN PHARMACIST DOCUMENTED: ICD-10-PCS | Mod: CPTII,,, | Performed by: FAMILY MEDICINE

## 2023-05-17 RX ORDER — TRIAMCINOLONE ACETONIDE 40 MG/ML
40 INJECTION, SUSPENSION INTRA-ARTICULAR; INTRAMUSCULAR
Status: DISCONTINUED | OUTPATIENT
Start: 2023-05-17 | End: 2023-05-17 | Stop reason: HOSPADM

## 2023-05-17 RX ADMIN — TRIAMCINOLONE ACETONIDE 40 MG: 40 INJECTION, SUSPENSION INTRA-ARTICULAR; INTRAMUSCULAR at 10:05

## 2023-05-17 NOTE — TELEPHONE ENCOUNTER
Attempted to contact pt. Left voicemail. Informed pt that MRI c sedation/anesthesia has been scheduled 05/29/23 at 1230 at [facility]. Advised that she will receive additional information directly from [facility] regarding MRI. Asked pt to return call to clinic at [phone] c additional questions/concerns.

## 2023-05-17 NOTE — PROGRESS NOTES
HISTORY OF PRESENT ILLNESS   05/17/23  Joyce Peterson, a 62 y.o. female, presents today for follow up evaluation of her right hip. She underwent R intra-articular hip & lateral gluteal tendon injections on 05/02/23. Patient reports that pain/symptoms have not improved since last visit. She reports worsening, constant pain since injections. Her pain is posterior/lateral. She reports difficulty & excruciating pain when she attempts to lay flat, having to sleep on couch. She was seen at ED on 05/12/23. She has been followed by pain management for hip & lumbar pain & seen 05/15/23. She has been taking Norco 10mg, Lyrica, Advil, & valium for her pain since Friday, 05/12/23. She also reports lower extremity swelling. She is using a wheelchair.     Prior 05/02/23  Joyce Peterson, a 62 y.o. female, is here for evaluation of right hip.   Location: proximal thigh   Onset: chronic, insidious  Palliative:    relative rest   oral analgesics, OTC    Provocative: prolonged ambulation  Prior: none  Progression: plateau discomfort   Quality: sharp  Radiation: none  Severity: per nursing documentation  Timing: intermittent with use  Trauma: none    Review of systems (ROS):  A 10+ review of systems was performed with pertinent positives and negatives noted above in the history of present illness. Other systems were negative unless otherwise specified.    PHYSICAL EXAMINATION  General:  The patient is alert and oriented x 3.  Mood is pleasant.  Observation of ears, eyes and nose reveal no gross abnormalities.  HEENT: NCAT, sclera nonicteric  Lungs: Respirations are equal and unlabored.   Gait is coordinated. Patient can toe walk and heel walk without difficulty.    HIP/PELVIS EXAMINATION    Observation/Inspection  Gait:   Nonantalgic   Alignment:  Neutral   Scars:   None   Muscle atrophy: None   Effusion:  None   Warmth:  None   Discoloration:   None   Leg lengths:   Equal   Pelvis:    Level     Tenderness/Crepitus  (T/C):      T / C  Trochanteric bursa   - / -  Piriformis    - / -  SI joint    - / -  Psoas tendon   - / -  Rectus insertion  - / -  Adductor insertion  - / -  Pubic symphysis  - / -    ROM: (* = pain)    Flexion:      120 degrees  External rotation:   40 degrees  Internal rotation with axial load:  30 degrees  Internal rotation without axial load:  40 degrees  Abduction:    45 degrees  Adduction:     20 degrees    Special Tests:  Pain w/ forced internal rotation (FADIR):  -   Pain w/ forced external rotation (SANDRA):  -   Circumduction test:     -  Stinchfield test:     -   Log roll:       -   Snapping hip (internal):    -   Sit-up pain:      -   Resisted sit-up pain:     -   Resisted sit-up with adductor contraction pain:  -   Step-down test:     +  Trendelenburg test:     -  Bridge test      +     Extremity Neuro-vascular Examination:   Sensation:  Grossly intact to light touch all dermatomal regions.   Motor Function:  Fully intact motor function at hip, knee, foot and ankle    DTRs;  quadriceps and  achilles 2+.  No clonus and downgoing Babinski.    Vascular status:  DP and PT pulses 2+, brisk capillary refill, symmetric.    Skin:  intact, compartments soft.    Other Findings:    ASSESSMENT & PLAN  Assessment  #1 Multi-level osteoarthritis / spondylosis changes of lumbar spine   W/ radic LE, right   W/ intractable pain  #2 [place] Grade II osteoarthritis of hip, right   W/ tendinosis of lateral gluteal tendons  #3 [place] Grade II osteoarthritis of hip, left   S/p ORIF    No evidence of vascular pathology    Imaging studies reviewed:  X-ray spine lumbar 23.05  X-ray pelvis and hip, bilat 23.04  MRI hip right 23.04  CT hip right 23.04    Plan  Given extreme dysfunction and discomfort, coupled with physical examination suggesting glenoid labral pathology, and with non-diagnostic x-ray imaging, we will obtain MRI arthrogram imaging for further evaluation of the glenoid labrum and associated soft tissue  structures of the shoulder.    We discussed options including    Watchful waiting / relative rest    Physical therapy x   Injection therapy Csi iahip right   Consultation    The patient chooses As above   x = prescribed  CSI = corticosteroid injection  VSI = viscosupplement injection  PRPI = platelet rich plasma injection  ia = intra articular  R = right  L = left  B = bilateral   nfSx = surgical consultation was recommended, but patient is not interested in consultation at this time    Physical Therapy        Formal (fPT), @ [facility] facility p   Formal (fPT), @ [facility] facility        Homegoing (hgPT), per concurrent fPT recommendations    Homegoing (hgPT), per prior fPT recommendations t   Homegoing (hgPT), handout provided        w/  (atPT)    [blank] = not prescribed  x = prescribed  b = prescribed, and begin as indicated  t = continue as indicated  r = prescribed, and restart as indicated  p = completed prior as indicated  hs = prescribed, and with high school   col = prescribed, and with college or university   nfPT = physical therapy was recommended, but patient is not interested in PT at this time    Activity (e.g. sports, work) restrictions    [blank] = as tolerated  pt = per physical therapist  at = per   NWB = non weight bearing on affected lower extremity, with crutches assistance for ambulation    Bracing    [blank] = not prescribed  r = recommended, but not fit with at todays visit  f = prescribed and fit with at todays visit  t = continue as indicated  d = d/c  p = as needed  rare = use on rare, as-needed basis; advised against chronic use    Pain management    [blank] = No prescription necessary. A handout detailing dosing of appropriate   over-the-counter musculoskeletal analgesics was made available to the patient.   m = meloxicam x 14 days  mp = 14 day course of meloxicam prescribed prior    Follow up Mri spine lumbar   [blank] = as  needed  [number] = in [number] weeks  CSI = for corticosteroid injection  VSI = for viscosupplement injection or injection series  PRP = for platelet rich plasma injection or injection series  MRI = after MRI imaging  ns = should surgical options be deferred (no surgery)  o = appointment offered, deferred by patient    Should symptoms worsen or fail to resolve, consider    Revisiting the above options and / or      Vocation:

## 2023-05-17 NOTE — PROCEDURES
"Large Joint Aspiration/Injection: R hip joint    Date/Time: 5/17/2023 10:15 AM  Performed by: Manjeet Díaz MD  Authorized by: Manjeet Díaz MD     Consent Done?:  Yes (Verbal)  Indications:  Pain  Site marked: the procedure site was marked    Timeout: prior to procedure the correct patient, procedure, and site was verified    Prep: patient was prepped and draped in usual sterile fashion    Local anesthesia used?: No      Details:  Needle Size:  20 G  Ultrasonic Guidance for needle placement?: Yes    Images are saved and documented.  Approach:  Anterior  Location:  Hip  Site:  R hip joint  Medications:  40 mg triamcinolone acetonide 40 mg/mL  Patient tolerance:  Patient tolerated the procedure well with no immediate complications     Description of ultrasound utilization for needle guidance:   Ultrasound guidance used for needle localization. Images saved and stored for documentation. The hip joint was visualized. Dynamic visualization of the 20g x 6.0" needle was continuous throughout the procedure.    Precautionary:  The patient's femoral artery, vein, and nerve were identified, marked with a skin marker, and visualized throughout the procedure, so as to avoid needle contact with those structures.   "

## 2023-05-17 NOTE — TELEPHONE ENCOUNTER
Spoke with patient on the phone in regards of emssage, patient said after she sent this message she saw an opening with Dr Singleton for Friday, I advised to keep appt so she can be evaluated.

## 2023-05-18 ENCOUNTER — PATIENT MESSAGE (OUTPATIENT)
Dept: RHEUMATOLOGY | Facility: CLINIC | Age: 63
End: 2023-05-18
Payer: MEDICARE

## 2023-05-18 ENCOUNTER — OFFICE VISIT (OUTPATIENT)
Dept: OTOLARYNGOLOGY | Facility: CLINIC | Age: 63
End: 2023-05-18
Payer: MEDICARE

## 2023-05-18 VITALS — WEIGHT: 147.63 LBS | BODY MASS INDEX: 27.89 KG/M2

## 2023-05-18 DIAGNOSIS — H92.11 OTORRHEA OF RIGHT EAR: ICD-10-CM

## 2023-05-18 DIAGNOSIS — H61.91 LESION OF EXTERNAL EAR CANAL, RIGHT: ICD-10-CM

## 2023-05-18 DIAGNOSIS — J34.89 NASAL VESTIBULITIS: ICD-10-CM

## 2023-05-18 DIAGNOSIS — H60.63 CHRONIC OTITIS EXTERNA OF BOTH EARS, UNSPECIFIED TYPE: Primary | ICD-10-CM

## 2023-05-18 PROCEDURE — 1159F PR MEDICATION LIST DOCUMENTED IN MEDICAL RECORD: ICD-10-PCS | Mod: CPTII,,, | Performed by: OTOLARYNGOLOGY

## 2023-05-18 PROCEDURE — 3008F PR BODY MASS INDEX (BMI) DOCUMENTED: ICD-10-PCS | Mod: CPTII,,, | Performed by: OTOLARYNGOLOGY

## 2023-05-18 PROCEDURE — 1159F MED LIST DOCD IN RCRD: CPT | Mod: CPTII,,, | Performed by: OTOLARYNGOLOGY

## 2023-05-18 PROCEDURE — 99999 PR PBB SHADOW E&M-EST. PATIENT-LVL IV: CPT | Mod: PBBFAC,,, | Performed by: OTOLARYNGOLOGY

## 2023-05-18 PROCEDURE — 3008F BODY MASS INDEX DOCD: CPT | Mod: CPTII,,, | Performed by: OTOLARYNGOLOGY

## 2023-05-18 PROCEDURE — 97597 DEBRIDEMENT: ICD-10-PCS | Mod: ,,, | Performed by: OTOLARYNGOLOGY

## 2023-05-18 PROCEDURE — 97597 DBRDMT OPN WND 1ST 20 CM/<: CPT | Mod: ,,, | Performed by: OTOLARYNGOLOGY

## 2023-05-18 PROCEDURE — 99999 PR PBB SHADOW E&M-EST. PATIENT-LVL IV: ICD-10-PCS | Mod: PBBFAC,,, | Performed by: OTOLARYNGOLOGY

## 2023-05-18 PROCEDURE — 99214 PR OFFICE/OUTPT VISIT, EST, LEVL IV, 30-39 MIN: ICD-10-PCS | Mod: 25,,, | Performed by: OTOLARYNGOLOGY

## 2023-05-18 PROCEDURE — 99214 OFFICE O/P EST MOD 30 MIN: CPT | Mod: 25,,, | Performed by: OTOLARYNGOLOGY

## 2023-05-18 RX ORDER — MUPIROCIN 20 MG/G
OINTMENT TOPICAL 2 TIMES DAILY
Qty: 15 G | Refills: 3 | Status: SHIPPED | OUTPATIENT
Start: 2023-05-18 | End: 2023-06-02

## 2023-05-18 RX ORDER — CIPROFLOXACIN AND DEXAMETHASONE 3; 1 MG/ML; MG/ML
4 SUSPENSION/ DROPS AURICULAR (OTIC) 2 TIMES DAILY
Qty: 7.5 ML | Refills: 1 | Status: SHIPPED | OUTPATIENT
Start: 2023-05-18 | End: 2023-05-29

## 2023-05-18 NOTE — PROGRESS NOTES
Chief Complaint   Patient presents with    Follow-up    bleeding ears on and off     bleeding nose / sore in nose        HPI:  Patient is a 62 y.o. female who has previously seen me for chronic otitis externa, right ear canal lesion, sialoadenitis.       Since the last visit, the patient reports overall she has been doing okay.  She noticed a small amount of some drainage and irritation in the right ear over the last couple of weeks, but no significant pain or changes in the hearing.  She has not been using any ear drops.    She also notes some increase in allergy symptoms and nasal irritation, especially nasal vestibule on the left side, where she has had some scant epistaxis.  She does not use medicated nasal sprays but does use saline nasal spray.    Active Ambulatory Problems     Diagnosis Date Noted    Osteoarthritis involving multiple joints on both sides of body 08/01/2012    GERD (gastroesophageal reflux disease) 08/01/2012    Gastroparesis 08/07/2012    Steroid-induced osteoporosis 11/29/2012    Thyroid nodule 08/22/2013    Hyperlipidemia 08/22/2013    Mild intermittent asthma without complication 11/04/2015    Rheumatoid arthritis involving multiple sites 11/16/2016    Iron deficiency anemia due to chronic blood loss 11/14/2017    Sjogren's syndrome 05/21/2018    Candidal esophagitis 10/15/2018    Coronary artery disease involving native coronary artery of native heart without angina pectoris 07/16/2019    Subclinical hyperthyroidism 02/21/2020    Fibromyalgia 03/12/2020    Dry eyes     Ureterocele     Urge urinary incontinence     Lumbar radiculopathy 07/23/2021    Fatty liver 12/29/2022    Hormone replacement therapy (HRT) 12/29/2022    Chronic pain 12/29/2022    Neuropathic pain of left foot 02/03/2023    Impaired gait and mobility 03/01/2023     Resolved Ambulatory Problems     Diagnosis Date Noted    Coronary artery disease 02/06/2013    Posterior tibial tendinitis of left leg 03/08/2013    Lumbar  spondylosis 03/08/2013    CS (cervical spondylosis) 03/08/2013    Osteoarthritis of both knees 03/08/2013    Occipital neuralgia 08/13/2014    Osteoarthritis of CMC joint of thumb 11/13/2014    URTI (acute upper respiratory infection) 07/28/2015    Umbilical hernia 08/13/2015    CMC (carpometacarpal) synovitis 10/19/2015    AR (allergic rhinitis) 11/04/2015    Iron deficiency anemia 11/11/2015    Vomiting 11/23/2015    Viral gastroenteritis 11/23/2015    Sepsis 11/23/2015    SIRS due to infectious process with organ dysfunction 11/23/2015    Intractable vomiting with nausea 11/23/2015    Volume depletion, gastrointestinal loss 11/23/2015    Immunosuppressed status 03/03/2016    Lateral meniscus derangement 04/06/2016    Lateral epicondylitis of right elbow 08/10/2016    Pleurisy 11/01/2016    Thoracic back pain 02/07/2017    Low back pain 02/07/2017    Carpal tunnel syndrome of right wrist 04/04/2017    Elevated parathyroid hormone 05/10/2017    Low TSH level 05/10/2017    Hypogammaglobulinemia 05/10/2017    Right carpal tunnel syndrome 05/29/2017    Iron deficiency anemia due to chronic blood loss 06/29/2017    Pain in right hand 06/30/2017    Pain in right elbow 06/30/2017    Range of motion deficit 06/30/2017    Decreased  strength of right hand 06/30/2017    Right shoulder pain 08/21/2017    Pure hypercholesterolemia 01/08/2018    Research study patient 04/30/2018    Left elbow pain 06/20/2018    Rupture of left triceps tendon 10/17/2018    Pain in left elbow 11/01/2018    Stiffness of left elbow joint 01/10/2019    Weakness of left arm 01/10/2019    Left hip pain 03/06/2019    Acute hip pain, left 03/20/2019    Gait abnormality 03/20/2019    Greater trochanteric bursitis of left hip 05/13/2019    Stress fracture of neck of left femur 05/20/2019    Left leg weakness 06/06/2019    Impaired functional mobility, balance, gait, and endurance 06/13/2019    Buttock pain 09/27/2019    Difficulty walking 12/05/2019     ROBERT (iron deficiency anemia) 01/24/2020    Recurrent UTI     Atrophic vaginitis     Drug-induced constipation     Dry mouth     Nocturia     Urinary frequency     RADHA (stress urinary incontinence, female)     Elevated serum GGT level 03/17/2021    History of long term use of Methotrexate  03/17/2021    Transaminasemia 03/17/2021    Chronic pain 03/29/2021    Acute pain of left shoulder 04/26/2021    Osteoarthritis of lumbar spine 08/23/2021    Decreased range of motion with decreased strength 10/05/2021    Impaired mobility and activities of daily living 10/05/2021    Decreased strength of lower extremity 10/08/2021    Healthcare maintenance 01/18/2022    Abnormal finding of blood chemistry, unspecified  01/19/2022    History of methotrexate therapy 01/19/2022    Stress fracture of left foot 06/28/2022    Arthritis of foot 06/28/2022    Pes planovalgus, acquired, left 06/28/2022    Degenerative joint disease of hindfoot, left 06/28/2022    Decreased strength, endurance, and mobility 07/05/2022    Impaired tolerance of activity 07/05/2022    Current chronic use of systemic steroids 12/29/2022    Edema 12/29/2022    Leukopenia 12/29/2022    History of COVID-19 12/29/2022     Past Medical History:   Diagnosis Date    Acid reflux     Allergy     Anemia     Asthma     Degenerative disc disease     Lobular carcinoma in situ     Osteoarthritis     Osteoporosis     Rheumatoid arthritis(714.0)        Review of Systems  General: negative for chills, fever or weight loss  Psychological: negative for mood changes or depression  Ophthalmic: negative for blurry vision, photophobia or eye pain  ENT: see HPI  Respiratory: no cough, shortness of breath, or wheezing  Cardiovascular: no chest pain or dyspnea on exertion  Gastrointestinal: no abdominal pain, change in bowel habits, or black/ bloody stools  Musculoskeletal: negative for gait disturbance or muscular weakness  Neurological: no syncope or seizures; no  ataxia  Dermatological: negative for pruritis, rash and jaundice  Hematologic/lymphatic: no easy bruising, no new adenopathy    Physical Exam     There were no vitals filed for this visit.      Constitutional:   She is oriented to person, place, and time. Vital signs are normal. She appears well-developed and well-nourished. She appears alert. She is cooperative.  Non-toxic appearance. Normal speech.      Head:  Normocephalic and atraumatic. Salivary glands normal.  Facial strength is normal.      Ears:  Left ear hearing normal to normal and whispered voice; external ear normal without scars, lesions, or masses; ear canal, tympanic membrane, and middle ear normal..   Right Ear: There is drainage. Tympanic membrane is not perforated, not erythematous and not retracted. No middle ear effusion.   Left Ear: Tympanic membrane is not perforated, not erythematous and not retracted.  No middle ear effusion.   Small area in 6 o'clock position of lateral EAC with crusting overlying skin.        Nose:  Mucosal edema present. No rhinorrhea, septal deviation, nasal septal hematoma or polyps. Epistaxis is observed. No turbinate masses and no turbinate hypertrophy (2+ size).  Right sinus exhibits no maxillary sinus tenderness and no frontal sinus tenderness. Left sinus exhibits no maxillary sinus tenderness and no frontal sinus tenderness.   Dried blood over the lower septum near junction with the skin of the nasal vestibule, area approximately 0.5 cm of excoriation with overlying dried blood.  No heaped upper exophytic lesion noted.  No purulent drainage.  No surrounding edema or erythema.  No exposed cartilage.    Mouth/Throat  Oropharynx clear and moist without lesions or asymmetry, normal uvula midline, lips, teeth, and gums normal and oropharynx normal. Normal dentition. No uvula swelling or oral lesions. No oropharyngeal exudate, posterior oropharyngeal edema or posterior oropharyngeal erythema. Tonsillar erythema, tonsillar  hyperemia, tonsillar exudate.  Mirror exam not performed due to patient tolerance.  Mirror exam not performed due to patient tolerance.      Neck:  Neck normal without thyromegaly masses, asymmetry, normal tracheal structure, crepitus, and tenderness, thyroid normal, trachea normal, full range of motion with neck supple and no adenopathy. No JVD present. Carotid bruit is not present. Thyroid tenderness is present. No edema and no erythema present. No thyroid mass and no thyromegaly present.     She has no cervical adenopathy.     Cardiovascular:    Normal rate, regular rhythm, normal heart sounds and rate and rhythm, heart sounds, and pulses normal.              Pulmonary/Chest:   Effort and breath sounds normal.     Psychiatric:   She has a normal mood and affect. Her speech is normal and behavior is normal.     Neurological:   She is alert and oriented to person, place, and time. She has neurological normal, alert and oriented. No cranial nerve deficit.     Skin:   No abrasions, lacerations, lesions, or rashes.     Debridement    Date/Time: 5/18/2023 2:20 PM  Performed by: Love Whaley MD  Authorized by: Love Whaley MD     Consent Done?:  Yes (Verbal)  Local anesthesia used?: No      Debridement - 1st Wound - General Location: Right ear canal.    Type of Debridement:  Non-excisional       Length (cm):  0.5       Area (sq cm):  0.25       Width (cm):  0.5       Percent Debrided (%):  100       Depth (cm):  0.1       Total Area Debrided (sq cm):  0.25    Depth of debridement:  Epidermis/Dermis    Tissue debrided:  Dermis and Epidermis    Devitalized tissue debrided:  Exudate and Slough    Instruments:  Curette (Suction)  Bleeding:  Minimal  Hemostasis Achieved: Yes  Patient tolerance:  Patient tolerated the procedure well with no immediate complications     Binocular microscopy utilized to examine EAC.  Small area of crusting in lateral EAC debrided with suction and curette.  Small area of exposed bone and  trace granulation tissue, no ongoing drainage, no surrounding skin abnormalities.      Assessment/Plan:      ICD-10-CM ICD-9-CM    1. Chronic otitis externa of both ears, unspecified type  H60.63 380.23 ciprofloxacin-dexAMETHasone 0.3-0.1% (CIPRODEX) 0.3-0.1 % DrpS      2. Nasal vestibulitis  J34.89 478.19 mupirocin (BACTROBAN) 2 % ointment          Mupirocin ointment to nasal vestibule 2 to 3 times a day.  Nasal saline for moisture.    Ciprodex drops right ear b.i.d. for 1 week.  Use hair drier to help dry moisture in the right EAC.    Follow-up in a few weeks to recheck nasal area and right EAC.    Love Whaley MD  [facility]

## 2023-05-18 NOTE — TELEPHONE ENCOUNTER
Specialty Pharmacy - Refill Coordination    Specialty Medication Orders Linked to Encounter      Flowsheet Row Most Recent Value   Medication #1 sarilumab (KEVZARA) 200 mg/1.14 mL Syrg (Order#[id number], Rx#[id number])            Refill Questions - Documented Responses      Flowsheet Row Most Recent Value   Patient Availability and HIPAA Verification    Does patient want to proceed with activity? Yes   HIPAA/medical authority confirmed? Yes   Relationship to patient of person spoken to? Self   Refill Screening Questions    Changes to allergies? No   Changes to medications? No   New conditions since last clinic visit? No   Unplanned office visit, urgent care, ED, or hospital admission in the last 4 weeks? No   How does patient/caregiver feel medication is working? Good   Financial problems or insurance changes? No   How many doses of your specialty medications were missed in the last 4 weeks? 1  [waiting on [facility] to send refill]   Why were doses missed? Other (comment)   Would patient like to speak to a pharmacist? No   When does the patient need to receive the medication? 05/22/23   Refill Delivery Questions    How will the patient receive the medication? [facility]   When does the patient need to receive the medication? 05/22/23   Shipping Address Home   Address in [facility] confirmed and updated if neccessary? Yes   Expected Copay ($) 218.85   Is the patient able to afford the medication copay? Yes   Payment Method CC on file   Days supply of Refill 28   Supplies needed? No supplies needed   Refill activity completed? Yes   Refill activity plan Refill scheduled   Shipment/Pickup Date: 05/18/23            Current Outpatient Medications   Medication Sig    albuterol (PROVENTIL/VENTOLIN HFA) 90 mcg/actuation inhaler Inhale 2 puffs into the lungs every 6 (six) hours as needed. Rescue    albuterol-ipratropium (DUO-NEB) 2.5 mg-0.5 mg/3 mL nebulizer solution Take 3 mLs by nebulization every 6 (six) hours as needed for  Wheezing. Rescue    ALPRAZolam (XANAX) 0.5 MG tablet Take once on call to procedure.    aspirin 325 MG tablet Take 1 tablet (325 mg total) by mouth once daily.    budesonide-formoterol 160-4.5 mcg (SYMBICORT) 160-4.5 mcg/actuation HFAA Inhale 2 puffs into the lungs every 12 (twelve) hours.    calcium citrate-vitamin D3 315-200 mg (CITRACAL+D) 315 mg-5 mcg (200 unit) per tablet Take 1 tablet by mouth 2 (two) times daily.     cycloSPORINE (RESTASIS) 0.05 % ophthalmic emulsion Instill one drop into both eyes two times per day    diazePAM (VALIUM) 10 MG Tab Take 1 tablet (10 mg total) by mouth once. for 1 dose    diclofenac sodium (VOLTAREN) 1 % Gel APPLY 2 GRAMS TOPICALLY 4 (FOUR) TIMES DAILY AS NEEDED.    estrogens, conjugated, (PREMARIN) 0.625 MG tablet take 1 tablet by mouth daily    fluconazole (DIFLUCAN) 100 MG tablet Take 1 tablet by mouth once daily for 14 days.    fluconazole (DIFLUCAN) 150 MG Tab Take 150 mg by mouth as needed.    halobetasol propion-tazarotene (DUOBRII) 0.01-0.045 % Lotn aaa on feet and hands qhs  then vaseline and warm towel (Patient taking differently: aaa on feet and hands qhs  then vaseline and warm towel)    INV PROPULSID 10 MG Take 2 tablets (20 mg) by mouth 4 (four) times daily. FOR INVESTIGATIONAL USE ONLY. Protocol [id number]  Subject ID: [id number].    ketoconazole (NIZORAL) 2 % cream Apply to feet twice daily for 3 weeks (Patient taking differently: Apply to feet twice daily for 3 weeks)    leflunomide (ARAVA) 20 MG Tab Take 1 tablet (20 mg total) by mouth once daily.    lifitegrast (XIIDRA) 5 % Dpet INSTILL ONE DROP INTO BOTH EYES TWICE A DAY    magic mouthwash diphen/antac/lidoc Swish and Spit 5 mL by mouth for 30 seconds twice daily.    methenamine (HIPREX) 1 gram Tab Take 1 tablet (1 g total) by mouth 2 (two) times daily.    methocarbamoL (ROBAXIN) 750 MG Tab Take 1 tablet (750 mg total) by mouth 3 (three) times daily.    mirabegron (MYRBETRIQ) 25 mg Tb24 ER tablet Take 1 tablet  (25 mg total) by mouth once daily.    montelukast (SINGULAIR) 10 mg tablet Take 1 tablet (10 mg total) by mouth every evening.    naproxen (NAPROSYN) 500 MG tablet Take 1 tablet (500 mg total) by mouth 2 (two) times daily as needed (prn).    omeprazole (PRILOSEC) 40 MG capsule Take 1 capsule (40 mg total) by mouth every morning.    omeprazole-sodium bicarbonate (ZEGERID) 40-1.1 mg-gram per capsule TAKE 1 CAPSULE BY MOUTH EVERY DAY IN THE MORNING    oxyCODONE-acetaminophen (PERCOCET) 5-325 mg per tablet Take 1 tablet by mouth every 8 (eight) hours as needed for Pain.    POTASSIUM ORAL Take by mouth once daily.    predniSONE (DELTASONE) 1 MG tablet TAKE 5 TABLETS (5 MG TOTAL) BY MOUTH ONCE DAILY.    predniSONE (DELTASONE) 5 MG tablet Take 1 tablet (5 mg total) by mouth once daily.    pregabalin (LYRICA) 50 MG capsule Take 3 capsules (150 mg total) by mouth every evening.    PREMARIN vaginal cream PLACE 0.5 GRAM VAGINALLY 3 (THREE) TIMES A WEEK.    progesterone (PROMETRIUM) 100 MG capsule Take 1 capsule by mouth daily.    progesterone (PROMETRIUM) 100 MG capsule take 1 capsule by mouth daily    promethazine (PHENERGAN) 25 MG tablet Take 1 tablet (25 mg total) by mouth every 6 (six) hours as needed for Nausea.    promethazine (PHENERGAN) 25 MG tablet Take 1 tablet (25 mg total) by mouth every 6 (six) hours as needed for Nausea.    rosuvastatin (CRESTOR) 20 MG tablet Take 1 tablet (20 mg total) by mouth every evening.    sarilumab (KEVZARA) 200 mg/1.14 mL Syrg Inject 1.14 mL (200 mg total) into the skin every 14 (fourteen) days.    sucralfate (CARAFATE) 1 gram tablet TAKE 1 TABLET (1 G TOTAL) BY MOUTH 4 (FOUR) TIMES DAILY.    sucralfate (CARAFATE) 1 gram tablet Take 1 tablet (1 g total) by mouth 4 (four) times daily.   Last reviewed on 5/17/2023 11:42 AM by Manjeet Díaz MD    Review of patient's allergies indicates:   Allergen Reactions    Actemra [tocilizumab] Other (See Comments)     Severe dizziness     "Codeine Nausea And Vomiting and Hives    Gold au 198 Hives and Rash    Hydroxychloroquine Other (See Comments)     Can't remember the reaction      Iodinated contrast media Other (See Comments)     Other reaction(s): BURNING ALL OVER    Iodine Other (See Comments) and Hives     Other reaction(s): BURNING ALL OVER - Iodine dye - Not topical    Ondansetron Nausea And Vomiting    Sulfa (sulfonamide antibiotics) Other (See Comments)     Can't remember the reaction    Zofran [ondansetron hcl (pf)] Nausea And Vomiting     Pt reports that last time she received zofran she started vomiting again    Methotrexate analogues Nausea Only    Pneumococcal vaccine Other (See Comments)    Pneumovax 23 [pneumococcal 23-argenis ps vaccine] Other (See Comments)     "sick"    Methotrexate Nausea Only    Last reviewed on  5/17/2023 11:42 AM by Manjeet Díaz      Tasks added this encounter   No tasks added.   Tasks due within next 3 months   No tasks due.     Aletha Guadalupe, PharmD  [facility]  [address] [place], [place] [address]  [facility] [address]  Phone: [phone]  Fax: [phone]        "

## 2023-05-18 NOTE — PATIENT INSTRUCTIONS
Use nasal ointment 2-3 times daily for next 2 weeks.   Use ciprodex ear drops in right ear twice daily for one week.      Use hairdryer to dry ear if feels wet.    No qtips in ear.     Saline nasal spray a few times daily.

## 2023-05-19 ENCOUNTER — PATIENT MESSAGE (OUTPATIENT)
Dept: RHEUMATOLOGY | Facility: CLINIC | Age: 63
End: 2023-05-19
Payer: MEDICARE

## 2023-05-19 ENCOUNTER — OFFICE VISIT (OUTPATIENT)
Dept: FAMILY MEDICINE | Facility: CLINIC | Age: 63
End: 2023-05-19
Payer: MEDICARE

## 2023-05-19 VITALS
DIASTOLIC BLOOD PRESSURE: 68 MMHG | HEIGHT: 61 IN | TEMPERATURE: 98 F | BODY MASS INDEX: 26.65 KG/M2 | SYSTOLIC BLOOD PRESSURE: 118 MMHG | OXYGEN SATURATION: 98 % | WEIGHT: 141.13 LBS | HEART RATE: 107 BPM

## 2023-05-19 DIAGNOSIS — R60.0 LEG EDEMA: Primary | ICD-10-CM

## 2023-05-19 PROCEDURE — 99215 OFFICE O/P EST HI 40 MIN: CPT | Mod: PN | Performed by: STUDENT IN AN ORGANIZED HEALTH CARE EDUCATION/TRAINING PROGRAM

## 2023-05-19 PROCEDURE — 99214 OFFICE O/P EST MOD 30 MIN: CPT | Mod: ,,, | Performed by: STUDENT IN AN ORGANIZED HEALTH CARE EDUCATION/TRAINING PROGRAM

## 2023-05-19 PROCEDURE — 1159F MED LIST DOCD IN RCRD: CPT | Mod: CPTII,,, | Performed by: STUDENT IN AN ORGANIZED HEALTH CARE EDUCATION/TRAINING PROGRAM

## 2023-05-19 PROCEDURE — 1160F RVW MEDS BY RX/DR IN RCRD: CPT | Mod: CPTII,,, | Performed by: STUDENT IN AN ORGANIZED HEALTH CARE EDUCATION/TRAINING PROGRAM

## 2023-05-19 PROCEDURE — 3074F SYST BP LT 130 MM HG: CPT | Mod: CPTII,,, | Performed by: STUDENT IN AN ORGANIZED HEALTH CARE EDUCATION/TRAINING PROGRAM

## 2023-05-19 PROCEDURE — 1160F PR REVIEW ALL MEDS BY PRESCRIBER/CLIN PHARMACIST DOCUMENTED: ICD-10-PCS | Mod: CPTII,,, | Performed by: STUDENT IN AN ORGANIZED HEALTH CARE EDUCATION/TRAINING PROGRAM

## 2023-05-19 PROCEDURE — 3074F PR MOST RECENT SYSTOLIC BLOOD PRESSURE < 130 MM HG: ICD-10-PCS | Mod: CPTII,,, | Performed by: STUDENT IN AN ORGANIZED HEALTH CARE EDUCATION/TRAINING PROGRAM

## 2023-05-19 PROCEDURE — 99999 PR PBB SHADOW E&M-EST. PATIENT-LVL V: ICD-10-PCS | Mod: PBBFAC,,, | Performed by: STUDENT IN AN ORGANIZED HEALTH CARE EDUCATION/TRAINING PROGRAM

## 2023-05-19 PROCEDURE — 3078F PR MOST RECENT DIASTOLIC BLOOD PRESSURE < 80 MM HG: ICD-10-PCS | Mod: CPTII,,, | Performed by: STUDENT IN AN ORGANIZED HEALTH CARE EDUCATION/TRAINING PROGRAM

## 2023-05-19 PROCEDURE — 1159F PR MEDICATION LIST DOCUMENTED IN MEDICAL RECORD: ICD-10-PCS | Mod: CPTII,,, | Performed by: STUDENT IN AN ORGANIZED HEALTH CARE EDUCATION/TRAINING PROGRAM

## 2023-05-19 PROCEDURE — 3078F DIAST BP <80 MM HG: CPT | Mod: CPTII,,, | Performed by: STUDENT IN AN ORGANIZED HEALTH CARE EDUCATION/TRAINING PROGRAM

## 2023-05-19 PROCEDURE — 3008F BODY MASS INDEX DOCD: CPT | Mod: CPTII,,, | Performed by: STUDENT IN AN ORGANIZED HEALTH CARE EDUCATION/TRAINING PROGRAM

## 2023-05-19 PROCEDURE — 99999 PR PBB SHADOW E&M-EST. PATIENT-LVL V: CPT | Mod: PBBFAC,,, | Performed by: STUDENT IN AN ORGANIZED HEALTH CARE EDUCATION/TRAINING PROGRAM

## 2023-05-19 PROCEDURE — 3008F PR BODY MASS INDEX (BMI) DOCUMENTED: ICD-10-PCS | Mod: CPTII,,, | Performed by: STUDENT IN AN ORGANIZED HEALTH CARE EDUCATION/TRAINING PROGRAM

## 2023-05-19 PROCEDURE — 99214 PR OFFICE/OUTPT VISIT, EST, LEVL IV, 30-39 MIN: ICD-10-PCS | Mod: ,,, | Performed by: STUDENT IN AN ORGANIZED HEALTH CARE EDUCATION/TRAINING PROGRAM

## 2023-05-19 RX ORDER — FUROSEMIDE 20 MG/1
20 TABLET ORAL DAILY
Qty: 5 TABLET | Refills: 0 | Status: SHIPPED | OUTPATIENT
Start: 2023-05-19 | End: 2023-11-07

## 2023-05-19 RX ORDER — POTASSIUM CHLORIDE 20 MEQ/1
20 TABLET, EXTENDED RELEASE ORAL DAILY
Qty: 5 TABLET | Refills: 0 | Status: SHIPPED | OUTPATIENT
Start: 2023-05-19 | End: 2023-11-07

## 2023-05-19 NOTE — PROGRESS NOTES
Subjective:      Patient ID: Joyce Peterson is a 62 y.o. female.    Chief Complaint: Leg Swelling (Bilateral leg and foot swelling/ pt has also been in a lot of pain in back in hips/ noticed it in the last week/ no leg pain)    - has been having SEVERE back and right hip pain; RA flare? Works with PM and ortho on this  - pain is getting better row that she has pain regimen but ortho was worried about LE edema that has developed in meantime and is concerned about kidney and heart and wants her to have checked out prior to any procedures   - she has not been able to lie down at night 2/2 pain and rather sleeping sitting in chair with head on pillows in lap; since then the swelling has worsened  - she has not really been able to elevated legs 2/2 pain to the hip and back   - she has been trying one at a time and various exercises but has not helped much   - she is worried about something else going on since she has never had swelling issues in past; other than the left foot which was swollen 2/2 recent surgery     Edema  This is a new problem. The current episode started in the past 7 days. The problem occurs constantly. The problem has been rapidly worsening. Associated symptoms include arthralgias, fatigue, myalgias, neck pain, numbness and weakness. She has tried walking, drinking and position changes for the symptoms. The treatment provided mild relief.   Review of Systems   Constitutional:  Positive for fatigue.   Cardiovascular:  Positive for leg swelling.   Musculoskeletal:  Positive for arthralgias, myalgias and neck pain.   Neurological:  Positive for weakness and numbness.      Objective:     Vitals:    05/19/23 0818   BP: 118/68   Pulse: 107   Temp: 98.1 °F (36.7 °C)      Physical Exam  Vitals reviewed.   Constitutional:       Appearance: Normal appearance. She is normal weight.   HENT:      Head: Normocephalic and atraumatic.   Eyes:      Conjunctiva/sclera: Conjunctivae normal.   Cardiovascular:       Rate and Rhythm: Normal rate.      Heart sounds: Normal heart sounds.   Pulmonary:      Effort: Pulmonary effort is normal.   Musculoskeletal:         General: Normal range of motion.      Right lower leg: Edema present.      Left lower leg: Edema present.   Neurological:      Mental Status: She is alert. Mental status is at baseline.   Psychiatric:         Mood and Affect: Mood normal.         Behavior: Behavior normal.      Assessment:         1. Leg edema          Plan:   1. Leg edema  - RENAL FUNCTION PANEL; Future  - furosemide (LASIX) 20 MG tablet; Take 1 tablet (20 mg total) by mouth once daily.  Dispense: 5 tablet; Refill: 0  - potassium chloride SA (K-DUR,KLOR-CON) 20 MEQ tablet; Take 1 tablet (20 mEq total) by mouth once daily. Take with lasix  Dispense: 5 tablet; Refill: 0  - COMPRESSION STOCKINGS     Suspect DEPENDENT EDEMA 2/2 acute pain; positional   Elevate legs as much as possible/tolerated  Check labs per orders; R/O heart and kidney concerns   Start lasix and potassium x 5 days   Compression stockings  RTC PRN         Krystal Singleton   [facility]   5/19/23

## 2023-05-22 ENCOUNTER — PATIENT MESSAGE (OUTPATIENT)
Dept: RHEUMATOLOGY | Facility: CLINIC | Age: 63
End: 2023-05-22
Payer: MEDICARE

## 2023-05-22 DIAGNOSIS — M79.7 FIBROMYALGIA: ICD-10-CM

## 2023-05-22 RX ORDER — PREGABALIN 50 MG/1
150 CAPSULE ORAL NIGHTLY
Qty: 270 CAPSULE | OUTPATIENT
Start: 2023-05-22

## 2023-05-22 RX ORDER — PREGABALIN 150 MG/1
150 CAPSULE ORAL 2 TIMES DAILY
Qty: 180 CAPSULE | Refills: 1 | Status: SHIPPED | OUTPATIENT
Start: 2023-05-22 | End: 2023-05-23

## 2023-05-23 ENCOUNTER — PATIENT MESSAGE (OUTPATIENT)
Dept: FAMILY MEDICINE | Facility: CLINIC | Age: 63
End: 2023-05-23
Payer: MEDICARE

## 2023-05-23 ENCOUNTER — PATIENT MESSAGE (OUTPATIENT)
Dept: ORTHOPEDICS | Facility: CLINIC | Age: 63
End: 2023-05-23
Payer: MEDICARE

## 2023-05-23 ENCOUNTER — TELEPHONE (OUTPATIENT)
Dept: ORTHOPEDICS | Facility: HOSPITAL | Age: 63
End: 2023-05-23
Payer: MEDICARE

## 2023-05-23 ENCOUNTER — PATIENT MESSAGE (OUTPATIENT)
Dept: RHEUMATOLOGY | Facility: CLINIC | Age: 63
End: 2023-05-23
Payer: MEDICARE

## 2023-05-23 ENCOUNTER — PATIENT MESSAGE (OUTPATIENT)
Dept: HEMATOLOGY/ONCOLOGY | Facility: CLINIC | Age: 63
End: 2023-05-23
Payer: MEDICARE

## 2023-05-23 DIAGNOSIS — M79.7 FIBROMYALGIA: ICD-10-CM

## 2023-05-23 DIAGNOSIS — G25.89 OTHER SPECIFIED EXTRAPYRAMIDAL AND MOVEMENT DISORDERS: ICD-10-CM

## 2023-05-23 DIAGNOSIS — Z86.2 HISTORY OF IRON DEFICIENCY ANEMIA: Primary | ICD-10-CM

## 2023-05-23 RX ORDER — PREGABALIN 50 MG/1
100 CAPSULE ORAL 3 TIMES DAILY
Qty: 540 CAPSULE | Refills: 0 | Status: SHIPPED | OUTPATIENT
Start: 2023-05-23 | End: 2023-05-24

## 2023-05-23 NOTE — TELEPHONE ENCOUNTER
Spoke to patient regarding a postop appointment moved postop appointment soon June 1st.  Discussed wound care.  Patient is going to send me a picture  so we can see what next steps are in wound care.    patient was last seen 5.10.23 - duoderm over lateral foot incisional wound.     She has been using for about 2 weeks now. Need updated picture to assess.    Jason Arreguin MS, OTC   Sports Medicine Assistant   [facility]  (P) [phone]  (F) [phone]

## 2023-05-23 NOTE — TELEPHONE ENCOUNTER
----- Message from Isiah Moran MD sent at 5/23/2023 10:55 AM CDT -----  Regarding: FW: Lyrica  Please find out how she is taking the Lyrica 50mg so I can prescribe it properly. RJQ  ----- Message -----  From: Delaney Whiteside LPN  Sent: 5/23/2023   9:11 AM CDT  To: Isiah Moran MD, Laya Dowd MA  Subject: Lyrica                                           Good morning ,     Patient requesting that, you send her refill for Lyrica 50mg. Current refill is 150mg take 1 tab twice daily. Patient states that is too strong for and it makes her too sleepy. Would like to take 50mg, 300mg total 6x's daily. Please let me know if you have any questions.     Thank you,   Delaney

## 2023-05-24 ENCOUNTER — TELEPHONE (OUTPATIENT)
Dept: ORTHOPEDICS | Facility: CLINIC | Age: 63
End: 2023-05-24
Payer: MEDICARE

## 2023-05-24 ENCOUNTER — OFFICE VISIT (OUTPATIENT)
Dept: PAIN MEDICINE | Facility: CLINIC | Age: 63
End: 2023-05-24
Attending: ANESTHESIOLOGY
Payer: MEDICARE

## 2023-05-24 ENCOUNTER — PATIENT MESSAGE (OUTPATIENT)
Dept: FAMILY MEDICINE | Facility: CLINIC | Age: 63
End: 2023-05-24
Payer: MEDICARE

## 2023-05-24 ENCOUNTER — TELEPHONE (OUTPATIENT)
Dept: PAIN MEDICINE | Facility: CLINIC | Age: 63
End: 2023-05-24
Payer: MEDICARE

## 2023-05-24 ENCOUNTER — PATIENT MESSAGE (OUTPATIENT)
Dept: ORTHOPEDICS | Facility: CLINIC | Age: 63
End: 2023-05-24
Payer: MEDICARE

## 2023-05-24 ENCOUNTER — TELEPHONE (OUTPATIENT)
Dept: SPORTS MEDICINE | Facility: CLINIC | Age: 63
End: 2023-05-24
Payer: MEDICARE

## 2023-05-24 ENCOUNTER — PATIENT MESSAGE (OUTPATIENT)
Dept: SPORTS MEDICINE | Facility: CLINIC | Age: 63
End: 2023-05-24
Payer: MEDICARE

## 2023-05-24 VITALS
RESPIRATION RATE: 18 BRPM | BODY MASS INDEX: 26.62 KG/M2 | DIASTOLIC BLOOD PRESSURE: 84 MMHG | HEIGHT: 61 IN | HEART RATE: 112 BPM | SYSTOLIC BLOOD PRESSURE: 125 MMHG | WEIGHT: 141 LBS

## 2023-05-24 DIAGNOSIS — M25.551 PAIN OF RIGHT HIP: Primary | ICD-10-CM

## 2023-05-24 DIAGNOSIS — M53.3 SACROILIAC DYSFUNCTION: ICD-10-CM

## 2023-05-24 DIAGNOSIS — M25.551 ACUTE RIGHT HIP PAIN: ICD-10-CM

## 2023-05-24 DIAGNOSIS — M54.16 LUMBAR RADICULOPATHY: Primary | ICD-10-CM

## 2023-05-24 DIAGNOSIS — M47.26 OSTEOARTHRITIS OF SPINE WITH RADICULOPATHY, LUMBAR REGION: ICD-10-CM

## 2023-05-24 DIAGNOSIS — M25.551 PAIN OF RIGHT HIP: ICD-10-CM

## 2023-05-24 DIAGNOSIS — M79.7 FIBROMYALGIA: ICD-10-CM

## 2023-05-24 DIAGNOSIS — S76.011S TEAR OF RIGHT GLUTEUS MEDIUS TENDON, SEQUELA: ICD-10-CM

## 2023-05-24 DIAGNOSIS — M16.11 PRIMARY OSTEOARTHRITIS OF RIGHT HIP: ICD-10-CM

## 2023-05-24 DIAGNOSIS — M54.9 INTRACTABLE BACK PAIN: ICD-10-CM

## 2023-05-24 PROCEDURE — 1160F PR REVIEW ALL MEDS BY PRESCRIBER/CLIN PHARMACIST DOCUMENTED: ICD-10-PCS | Mod: CPTII,S$GLB,, | Performed by: ANESTHESIOLOGY

## 2023-05-24 PROCEDURE — 99215 PR OFFICE/OUTPT VISIT, EST, LEVL V, 40-54 MIN: ICD-10-PCS | Mod: GC,S$GLB,, | Performed by: ANESTHESIOLOGY

## 2023-05-24 PROCEDURE — 3074F SYST BP LT 130 MM HG: CPT | Mod: CPTII,S$GLB,, | Performed by: ANESTHESIOLOGY

## 2023-05-24 PROCEDURE — 3008F PR BODY MASS INDEX (BMI) DOCUMENTED: ICD-10-PCS | Mod: CPTII,S$GLB,, | Performed by: ANESTHESIOLOGY

## 2023-05-24 PROCEDURE — 99215 OFFICE O/P EST HI 40 MIN: CPT | Mod: GC,S$GLB,, | Performed by: ANESTHESIOLOGY

## 2023-05-24 PROCEDURE — 1159F MED LIST DOCD IN RCRD: CPT | Mod: CPTII,S$GLB,, | Performed by: ANESTHESIOLOGY

## 2023-05-24 PROCEDURE — 3079F DIAST BP 80-89 MM HG: CPT | Mod: CPTII,S$GLB,, | Performed by: ANESTHESIOLOGY

## 2023-05-24 PROCEDURE — 1159F PR MEDICATION LIST DOCUMENTED IN MEDICAL RECORD: ICD-10-PCS | Mod: CPTII,S$GLB,, | Performed by: ANESTHESIOLOGY

## 2023-05-24 PROCEDURE — 1160F RVW MEDS BY RX/DR IN RCRD: CPT | Mod: CPTII,S$GLB,, | Performed by: ANESTHESIOLOGY

## 2023-05-24 PROCEDURE — 99999 PR PBB SHADOW E&M-EST. PATIENT-LVL V: ICD-10-PCS | Mod: PBBFAC,,, | Performed by: ANESTHESIOLOGY

## 2023-05-24 PROCEDURE — 3074F PR MOST RECENT SYSTOLIC BLOOD PRESSURE < 130 MM HG: ICD-10-PCS | Mod: CPTII,S$GLB,, | Performed by: ANESTHESIOLOGY

## 2023-05-24 PROCEDURE — 99999 PR PBB SHADOW E&M-EST. PATIENT-LVL V: CPT | Mod: PBBFAC,,, | Performed by: ANESTHESIOLOGY

## 2023-05-24 PROCEDURE — 3079F PR MOST RECENT DIASTOLIC BLOOD PRESSURE 80-89 MM HG: ICD-10-PCS | Mod: CPTII,S$GLB,, | Performed by: ANESTHESIOLOGY

## 2023-05-24 PROCEDURE — 3008F BODY MASS INDEX DOCD: CPT | Mod: CPTII,S$GLB,, | Performed by: ANESTHESIOLOGY

## 2023-05-24 RX ORDER — HYDROMORPHONE HYDROCHLORIDE 4 MG/1
2 TABLET ORAL EVERY 8 HOURS PRN
Qty: 45 TABLET | Refills: 0 | Status: SHIPPED | OUTPATIENT
Start: 2023-05-24 | End: 2023-06-19 | Stop reason: SDUPTHER

## 2023-05-24 RX ORDER — METHOCARBAMOL 750 MG/1
750 TABLET, FILM COATED ORAL 3 TIMES DAILY
Qty: 90 TABLET | Refills: 0 | Status: SHIPPED | OUTPATIENT
Start: 2023-05-24 | End: 2023-07-21

## 2023-05-24 RX ORDER — PREGABALIN 100 MG/1
100 CAPSULE ORAL 3 TIMES DAILY
Qty: 90 CAPSULE | Refills: 2 | Status: SHIPPED | OUTPATIENT
Start: 2023-05-24 | End: 2023-07-07 | Stop reason: SDUPTHER

## 2023-05-24 RX ORDER — METHOCARBAMOL 750 MG/1
750 TABLET, FILM COATED ORAL 4 TIMES DAILY
Qty: 120 TABLET | Refills: 0 | Status: SHIPPED | OUTPATIENT
Start: 2023-05-24 | End: 2023-06-19 | Stop reason: SDUPTHER

## 2023-05-24 NOTE — TELEPHONE ENCOUNTER
----- Message from Namita Brambila sent at 5/24/2023  9:59 AM CDT -----  Regarding: pt advice  Contact: [phone]  Joyce Peterson calling regarding Patient Advice (message) for #requesting a call back regarding she would like to know who was helping him at last appt. Please call

## 2023-05-24 NOTE — TELEPHONE ENCOUNTER
Spoke to patient. She said that she would be able to  Renee on Friday sometime. Told her it would be at the .    Jason Arreguin MS, OTC   Sports Medicine Assistant   [facility]  (P) [phone]  (F) [phone]

## 2023-05-24 NOTE — PROGRESS NOTES
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 femoris tendons are intact.  Partial tear of the gluteus minimus and medius tendons at the greater trochanter attachment.  Small volume greater trochanteric bursal fluid.  Subtotal tear of the right proximal hamstring tendon at the ischial tuberosity attachment.     SOFT TISSUES: Fatty atrophy gluteus minimus and medius.  Narrowing of the ischiofemoral space, measuring 1.2 cm, with edema and partial tear of the quadratus femoris muscle (7:7).  The sciatic nerve is unremarkable.     Impression:     NO FRACTURE.     Mild right hip degenerative changes.     Anterosuperior labral tear.     Partial tears of the gluteus minimus and medius tendons, with greater trochanteric bursitis.     Subtotal tear of the proximal hamstring tendon.     Signs of ischiofemoral impingement.     Other findings as described.  Lab Results   Component Value Date    WBC 4.78 05/24/2023    HGB 11.2 (L) 05/24/2023    HCT 35.7 (L) 05/24/2023    MCV 83 05/24/2023     05/24/2023       CMP  Sodium   Date Value Ref Range Status   05/19/2023 139 136 - 145 mmol/L Final     Potassium   Date Value Ref Range Status   05/19/2023 3.6 3.5 - 5.1 mmol/L Final     Chloride   Date Value Ref Range Status   05/19/2023 104 95 - 110 mmol/L Final     CO2   Date Value Ref Range Status   05/19/2023 27 23 - 29 mmol/L Final     Glucose   Date Value Ref Range Status   05/19/2023 130 (H) 70 - 110 mg/dL  Final     BUN   Date Value Ref Range Status   05/19/2023 14 7 - 17 mg/dL Final     Creatinine   Date Value Ref Range Status   05/19/2023 0.59 0.50 - 1.40 mg/dL Final     Calcium   Date Value Ref Range Status   05/19/2023 8.5 (L) 8.7 - 10.5 mg/dL Final     Total Protein   Date Value Ref Range Status   05/09/2023 6.5 6.0 - 8.4 g/dL Final     Albumin   Date Value Ref Range Status   05/19/2023 3.7 3.5 - 5.2 g/dL Final     Total Bilirubin   Date Value Ref Range Status   05/09/2023 0.5 0.1 - 1.0 mg/dL Final     Comment:     For infants and newborns, interpretation of results should be based  on gestational age, weight and in agreement with clinical  observations.    Premature Infant recommended reference ranges:  Up to 24 hours.............<8.0 mg/dL  Up to 48 hours............<12.0 mg/dL  3-5 days..................<15.0 mg/dL  6-29 days.................<15.0 mg/dL       Alkaline Phosphatase   Date Value Ref Range Status   05/09/2023 80 38 - 126 U/L Final     AST   Date Value Ref Range Status   05/09/2023 28 15 - 46 U/L Final     ALT   Date Value Ref Range Status   05/09/2023 24 10 - 44 U/L Final     Anion Gap   Date Value Ref Range Status   05/19/2023 8 8 - 16 mmol/L Final     eGFR if    Date Value Ref Range Status   06/20/2022 >60.0 >60 mL/min/1.73 m^2 Final     eGFR if non    Date Value Ref Range Status   06/20/2022 >60.0 >60 mL/min/1.73 m^2 Final     Comment:     Calculation used to obtain the estimated glomerular filtration  rate (eGFR) is the CKD-EPI equation.            Allergies:   Review of patient's allergies indicates:   Allergen Reactions    Actemra [tocilizumab] Other (See Comments)     Severe dizziness    Codeine Nausea And Vomiting and Hives    Gold au 198 Hives and Rash    Hydroxychloroquine Other (See Comments)     Can't remember the reaction      Iodinated contrast media Other (See Comments)     Other reaction(s): BURNING ALL OVER    Iodine Other (See Comments) and Hives  "    Other reaction(s): BURNING ALL OVER - Iodine dye - Not topical    Ondansetron Nausea And Vomiting    Sulfa (sulfonamide antibiotics) Other (See Comments)     Can't remember the reaction    Zofran [ondansetron hcl (pf)] Nausea And Vomiting     Pt reports that last time she received zofran she started vomiting again    Methotrexate analogues Nausea Only    Pneumococcal vaccine Other (See Comments)    Pneumovax 23 [pneumococcal 23-argenis ps vaccine] Other (See Comments)     "sick"    Methotrexate Nausea Only       Current Medications:   Current Outpatient Medications   Medication Sig Dispense Refill    albuterol (PROVENTIL/VENTOLIN HFA) 90 mcg/actuation inhaler Inhale 2 puffs into the lungs every 6 (six) hours as needed. Rescue 20.1 g 11    ALPRAZolam (XANAX) 0.5 MG tablet Take once on call to procedure. 1 tablet 0    budesonide-formoterol 160-4.5 mcg (SYMBICORT) 160-4.5 mcg/actuation HFAA Inhale 2 puffs into the lungs every 12 (twelve) hours. 30.6 g 3    calcium citrate-vitamin D3 315-200 mg (CITRACAL+D) 315 mg-5 mcg (200 unit) per tablet Take 1 tablet by mouth 2 (two) times daily.       ciprofloxacin-dexAMETHasone 0.3-0.1% (CIPRODEX) 0.3-0.1 % DrpS Place 4 drops into the left ear 2 (two) times daily. for 10 days 7.5 mL 1    cycloSPORINE (RESTASIS) 0.05 % ophthalmic emulsion Instill one drop into both eyes two times per day 180 each 3    diclofenac sodium (VOLTAREN) 1 % Gel APPLY 2 GRAMS TOPICALLY 4 (FOUR) TIMES DAILY AS NEEDED. 300 g 2    estrogens, conjugated, (PREMARIN) 0.625 MG tablet take 1 tablet by mouth daily 90 tablet 4    fluconazole (DIFLUCAN) 100 MG tablet Take 1 tablet by mouth once daily for 14 days. 14 tablet 0    fluconazole (DIFLUCAN) 150 MG Tab Take 150 mg by mouth as needed.      furosemide (LASIX) 20 MG tablet Take 1 tablet (20 mg total) by mouth once daily. 5 tablet 0    halobetasol propion-tazarotene (DUOBRII) 0.01-0.045 % Lotn aaa on feet and hands qhs  then vaseline and warm towel (Patient " taking differently: aaa on feet and hands qhs  then vaseline and warm towel) 100 g 3    INV PROPULSID 10 MG Take 2 tablets (20 mg) by mouth 4 (four) times daily. FOR INVESTIGATIONAL USE ONLY. Protocol [id number]  Subject ID: [id number]. 1440 each 0    ketoconazole (NIZORAL) 2 % cream Apply to feet twice daily for 3 weeks (Patient taking differently: Apply to feet twice daily for 3 weeks) 60 g 3    leflunomide (ARAVA) 20 MG Tab Take 1 tablet (20 mg total) by mouth once daily. 90 tablet 0    lifitegrast (XIIDRA) 5 % Dpet INSTILL ONE DROP INTO BOTH EYES TWICE A DAY 60 mL 10    magic mouthwash diphen/antac/lidoc Swish and Spit 5 mL by mouth for 30 seconds twice daily. 150 mL 0    methenamine (HIPREX) 1 gram Tab Take 1 tablet (1 g total) by mouth 2 (two) times daily. 180 tablet 3    methocarbamoL (ROBAXIN) 750 MG Tab Take 1 tablet (750 mg total) by mouth 3 (three) times daily. 90 tablet 0    mirabegron (MYRBETRIQ) 25 mg Tb24 ER tablet Take 1 tablet (25 mg total) by mouth once daily. 90 tablet 0    montelukast (SINGULAIR) 10 mg tablet Take 1 tablet (10 mg total) by mouth every evening. 90 tablet 3    mupirocin (BACTROBAN) 2 % ointment Apply to nose twice daily for 14 days 15 g 3    naproxen (NAPROSYN) 500 MG tablet Take 1 tablet (500 mg total) by mouth 2 (two) times daily as needed (prn). 180 tablet 1    omeprazole (PRILOSEC) 40 MG capsule Take 1 capsule (40 mg total) by mouth every morning. 30 capsule 6    omeprazole-sodium bicarbonate (ZEGERID) 40-1.1 mg-gram per capsule TAKE 1 CAPSULE BY MOUTH EVERY DAY IN THE MORNING 90 capsule 3    oxyCODONE-acetaminophen (PERCOCET) 5-325 mg per tablet Take 1 tablet by mouth every 8 (eight) hours as needed for Pain. 45 tablet 0    potassium chloride SA (K-DUR,KLOR-CON) 20 MEQ tablet Take 1 tablet (20 mEq total) by mouth once daily. Take with lasix 5 tablet 0    POTASSIUM ORAL Take by mouth once daily.      predniSONE (DELTASONE) 1 MG tablet TAKE 5 TABLETS (5 MG TOTAL) BY MOUTH ONCE  DAILY. 450 tablet 1    predniSONE (DELTASONE) 5 MG tablet Take 1 tablet (5 mg total) by mouth once daily. 90 tablet 1    pregabalin (LYRICA) 100 MG capsule Take 1 capsule (100 mg total) by mouth 3 (three) times daily. 90 capsule 2    PREMARIN vaginal cream PLACE 0.5 GRAM VAGINALLY 3 (THREE) TIMES A WEEK. 30 g 1    progesterone (PROMETRIUM) 100 MG capsule Take 1 capsule by mouth daily. 90 capsule 1    progesterone (PROMETRIUM) 100 MG capsule take 1 capsule by mouth daily 90 capsule 4    promethazine (PHENERGAN) 25 MG tablet Take 1 tablet (25 mg total) by mouth every 6 (six) hours as needed for Nausea. 30 tablet 1    promethazine (PHENERGAN) 25 MG tablet Take 1 tablet (25 mg total) by mouth every 6 (six) hours as needed for Nausea. 15 tablet 0    rosuvastatin (CRESTOR) 20 MG tablet Take 1 tablet (20 mg total) by mouth every evening. 90 tablet 3    sarilumab (KEVZARA) 200 mg/1.14 mL Syrg Inject 1.14 mL (200 mg total) into the skin every 14 (fourteen) days. 2.28 mL 0    sucralfate (CARAFATE) 1 gram tablet TAKE 1 TABLET (1 G TOTAL) BY MOUTH 4 (FOUR) TIMES DAILY. 360 tablet 2    sucralfate (CARAFATE) 1 gram tablet Take 1 tablet (1 g total) by mouth 4 (four) times daily. 360 tablet 3    albuterol-ipratropium (DUO-NEB) 2.5 mg-0.5 mg/3 mL nebulizer solution Take 3 mLs by nebulization every 6 (six) hours as needed for Wheezing. Rescue 90 mL 3    aspirin 325 MG tablet Take 1 tablet (325 mg total) by mouth once daily. 30 tablet 0    diazePAM (VALIUM) 10 MG Tab Take 1 tablet (10 mg total) by mouth once. for 1 dose 1 tablet 0     No current facility-administered medications for this visit.       REVIEW OF SYSTEMS:    GENERAL:  No weight loss, malaise or fevers.  HEENT:  Negative for frequent or significant headaches.  NECK:  Negative for lumps, goiter, pain and significant neck swelling.  RESPIRATORY:  Negative for cough, wheezing or shortness of breath. Asthma.  CARDIOVASCULAR:  Negative for chest pain, leg swelling or  palpitations. CAD.  GI:  Negative for abdominal discomfort, blood in stools or black stools or change in bowel habits.  MUSCULOSKELETAL:  See HPI.  SKIN:  Negative for lesions, rash, and itching.  PSYCH:  Negative for sleep disturbance, mood disorder and recent psychosocial stressors.  HEMATOLOGY/LYMPHOLOGY:  Negative for prolonged bleeding, bruising easily or swollen nodes.  NEURO:   No history of headaches, syncope, paralysis, seizures or tremors.  All other reviewed and negative other than HPI.    Past Medical History:  Past Medical History:   Diagnosis Date    Acid reflux     Allergy     Anemia     Asthma     Coronary artery disease     CS (cervical spondylosis) 03/08/2013    Degenerative disc disease     Degenerative joint disease of hindfoot, left 6/28/2022    Dry eyes     Dry mouth     Gastroparesis     History of methotrexate therapy 01/19/2022    Hyperlipidemia     Lateral meniscus derangement 04/06/2016    Lobular carcinoma in situ     Lumbar spondylosis 03/08/2013    Osteoarthritis     Osteoporosis     Pes planovalgus, acquired, left 6/28/2022    Rheumatoid arthritis(714.0)     Rupture of left triceps tendon 10/17/2018    Umbilical hernia 08/13/2015       Past Surgical History:  Past Surgical History:   Procedure Laterality Date    BREAST BIOPSY Left 01/29/2002    core bx    CARPAL TUNNEL RELEASE Right 05/2017    CHOLECYSTECTOMY  2004    COLONOSCOPY      10/11    COLONOSCOPY N/A 6/29/2017    Procedure: COLONOSCOPY;  Surgeon: López Moore MD;  Location: [address]);  Service: Endoscopy;  Laterality: N/A;    EPIDURAL STEROID INJECTION N/A 11/18/2021    Procedure: INJECTION, STEROID, EPIDURAL, L5-S1 IL NEED CONSENT;  Surgeon: Tj Mayfield MD;  Location: [facility] PAIN [facility]GT;  Service: Pain Management;  Laterality: N/A;    EPIDURAL STEROID INJECTION N/A 9/29/2022    Procedure: LUMBAR L3/L4 IL MADELINE CONTRAST;  Surgeon: Tj Mayfield MD;  Location: [facility]IN [facility]GT;  Service: Pain Management;  Laterality: N/A;     EPIDURAL STEROID INJECTION N/A 12/12/2022    Procedure: INJECTION, STEROID, EPIDURAL L5/S1 IL CONTRAST;  Surgeon: Tj Mayfield MD;  Location: [facility];  Service: Pain Management;  Laterality: N/A;    EPIDURAL STEROID INJECTION N/A 4/6/2023    Procedure: INJECTION, STEROID, EPIDURAL L5/S1;  Surgeon: Tj Mayfield MD;  Location: [facility];  Service: Pain Management;  Laterality: N/A;    FOOT ARTHRODESIS Left 1/11/2023    Procedure: FUSION, FOOT;  Surgeon: Ariella Finnegan MD;  Location: [facility] OR;  Service: Orthopedics;  Laterality: Left;  [facility]    HARVESTING OF BONE GRAFT Left 1/11/2023    Procedure: SURGICAL PROCUREMENT, BONE GRAFT;  Surgeon: Ariella Finnegan MD;  Location: [facility] OR;  Service: Orthopedics;  Laterality: Left;    INJECTION OF ANESTHETIC AGENT AROUND NERVE Bilateral 8/23/2021    Procedure: BLOCK, NERVE, MEDIAL BRANCH L3,L4,L5;  Surgeon: Tj Mayfield MD;  Location: [facility]IN [facility]GT;  Service: Pain Management;  Laterality: Bilateral;  1 of 2    INJECTION OF ANESTHETIC AGENT AROUND NERVE Bilateral 8/26/2021    Procedure: BLOCK, NERVE, MEDIAL BRANCH L3,L4,L5;  Surgeon: Tj Mayfield MD;  Location: [facility];  Service: Pain Management;  Laterality: Bilateral;  2 of 2    INJECTION OF ANESTHETIC AGENT AROUND NERVE Left 7/7/2022    Procedure: BLOCK, NERVE, LEFT OBTURATOR AND FEMORAL;  Surgeon: Tj Mayfield MD;  Location: [facility];  Service: Pain Management;  Laterality: Left;    INJECTION OF FACET JOINT Bilateral 3/12/2020    Procedure: FACET JOINT INJECTION (LUMBAR BLOCK) BILATERAL L4-5 AND L5-S1 DIRECT REFERRAL;  Surgeon: Tj Mayfield MD;  Location: [facility];  Service: Pain Management;  Laterality: Bilateral;  NEEDS CONSENT    INJECTION OF FACET JOINT Bilateral 3/29/2021    Procedure: INJECTION, FACET JOINT L3/4, L4/5, L5/S1;  Surgeon: Tj Mayfield MD;  Location: [facility];  Service: Pain Management;  Laterality: Bilateral;    INJECTION OF JOINT Right 7/30/2020    Procedure: INJECTION, JOINT,  RIGHT HIP Interarticular under flouro;  Surgeon: Tj Mayfield MD;  Location: [facility];  Service: Pain Management;  Laterality: Right;  INJECTION, JOINT, RIGHT HIP Interarticular under flouro    INJECTION OF JOINT Right 2/21/2022    Procedure: Injection, Joint RIGHT ISCHIAL BURSA;  Surgeon: Tj Mayfield MD;  Location: [facility]GT;  Service: Pain Management;  Laterality: Right;    INTRAMEDULLARY RODDING OF FEMUR Left 5/21/2019    Procedure: INSERTION, INTRAMEDULLARY ARIEL, FEMUR;  Surgeon: López Duff MD;  [address];  Service: Orthopedics;  Laterality: Left;    LENGTHENING OF TENDON OF LOWER EXTREMITY Left 1/11/2023    Procedure: LENGTHENING, TENDON, LOWER EXTREMITY;  Surgeon: Ariella Finnegan MD;  Location: [facility];  Service: Orthopedics;  Laterality: Left;    RADIOFREQUENCY ABLATION Left 9/20/2021    Procedure: RADIOFREQUENCY ABLATION left L3,4,5 RFA  1 of 2  CONSENT NEEDED;  Surgeon: Tj Mayfield MD;  Location: [facility];  Service: Pain Management;  Laterality: Left;    RADIOFREQUENCY ABLATION Right 10/4/2021    Procedure: RADIOFREQUENCY ABLATION  right L3,4,5 RFA   2 of 2  CONSENT NEEDED;  Surgeon: Tj Mayfield MD;  Location: [facility];  Service: Pain Management;  Laterality: Right;    RADIOFREQUENCY ABLATION Left 4/21/2022    Procedure: Radiofrequency Ablation LEFT L3,L4,L5;  Surgeon: Tj Mayfield MD;  Location: [facility];  Service: Pain Management;  Laterality: Left;    RADIOFREQUENCY ABLATION Right 5/12/2022    Procedure: Radiofrequency Ablation RIGHT L3,L4,L5;  Surgeon: Tj Mayfield MD;  Location: [facility];  Service: Pain Management;  Laterality: Right;    RADIOFREQUENCY ABLATION Left 7/25/2022    Procedure: Radiofrequency Ablation Left Femoral & Obturator;  Surgeon: Tj Mayfield MD;  Location: [facility];  Service: Pain Management;  Laterality: Left;    REPAIR OF TRICEPS TENDON Left 10/17/2018    Procedure: REPAIR, TENDON, TRICEPS left elbow;  Surgeon: Staci YANEZ  MD Linnea;  Location: [facility] OR 1ST FLR;  Service: Orthopedics;  Laterality: Left;  Anesthesia: General and regional. PRONE, k-wire , hand pan 1 and pan 2, CALL ARTHREX/Tamera notified 10-12 LO    TONSILLECTOMY      TRANSFORAMINAL EPIDURAL INJECTION OF STEROID Right 8/31/2020    Procedure: INJECTION, STEROID, EPIDURAL, TRANSFORAMINAL,  APPROACH, L3-L4 and L4-L5 need consent;  Surgeon: Tj Mayfield MD;  Location: [facility];  Service: Pain Management;  Laterality: Right;    TRANSFORAMINAL EPIDURAL INJECTION OF STEROID Bilateral 7/23/2021    Procedure: INJECTION, STEROID, EPIDURAL, TRANSFORAMINAL APPROACH L4/5;  Surgeon: Tj Mayfield MD;  Location: [facility];  Service: Pain Management;  Laterality: Bilateral;    TRIGGER POINT INJECTION N/A 7/23/2021    Procedure: INJECTION, TRIGGER POINT SCAPULAR;  Surgeon: Tj Mayfield MD;  Location: [facility];  Service: Pain Management;  Laterality: N/A;    TUBAL LIGATION  2003    UPPER GASTROINTESTINAL ENDOSCOPY      10/11    uterine ablation  2003       Family History:  Family History   Problem Relation Age of Onset    Cancer Mother         Lung Cancer    Emphysema Mother     Heart attack Mother     Alcohol abuse Mother     Cancer Father         Lung Cancer    Osteoarthritis Father     Lung cancer Father     Skin cancer Father     Alcohol abuse Father     Heart disease Brother     Heart attack Brother     Alcohol abuse Brother     Cirrhosis Brother     Osteoarthritis Paternal Aunt     Retinal detachment Maternal Aunt     No Known Problems Sister     No Known Problems Maternal Uncle     No Known Problems Paternal Uncle     No Known Problems Maternal Grandmother     No Known Problems Maternal Grandfather     No Known Problems Paternal Grandmother     No Known Problems Paternal Grandfather     Colon cancer Neg Hx     Esophageal cancer Neg Hx     Stomach cancer Neg Hx     Celiac disease Neg Hx     Diabetes Neg Hx     Thyroid disease Neg Hx     Amblyopia Neg Hx      "Blindness Neg Hx     Cataracts Neg Hx     Glaucoma Neg Hx     Hypertension Neg Hx     Macular degeneration Neg Hx     Strabismus Neg Hx     Stroke Neg Hx        Social History:  Social History     Socioeconomic History    Marital status:    Tobacco Use    Smoking status: Never    Smokeless tobacco: Never   Substance and Sexual Activity    Alcohol use: Yes     Comment: 2-3 times per year.    Drug use: No    Sexual activity: Yes     Partners: Male     Birth control/protection: Surgical     Social Determinants of Health     Financial Resource Strain: Low Risk     Difficulty of Paying Living Expenses: Not hard at all   Food Insecurity: Unknown    Worried About Running Out of Food in the Last Year: Patient refused    Ran Out of Food in the Last Year: Patient refused   Transportation Needs: Unknown    Lack of Transportation (Medical): Patient refused    Lack of Transportation (Non-Medical): Patient refused   Physical Activity: Unknown    Days of Exercise per Week: 2 days   Stress: No Stress Concern Present    Feeling of Stress : Not at all   Social Connections: Unknown    Frequency of Communication with Friends and Family: Twice a week    Frequency of Social Gatherings with Friends and Family: Twice a week    Active Member of Clubs or Organizations: Yes    Attends Club or Organization Meetings: Patient refused    Marital Status:    Housing Stability: Unknown    Unable to Pay for Housing in the Last Year: No    Unstable Housing in the Last Year: No       OBJECTIVE:  /84   Pulse (!) 112   Resp 18   Ht 5' 1" (1.549 m)   Wt 64 kg (141 lb)   LMP  (LMP Unknown)   BMI 26.64 kg/m²       PHYSICAL EXAMINATION:    General appearance: Well appearing, in no acute distress, alert.  Psych:  Mood and affect appropriate.  Back:  No TTP over lumbar facet joints bilaterally. Limited ROM with pain on lumbar extension and flexion.  Facet loading bilaterally. TTP over R SI joint. Negative SLR bilaterally.   MSK:  No " TTP over right gluteal tendon and anterolateral hip. Moderate pain with external rotation of right hip. 5/5 strength with right knee flexion and extension. 5/5 strength with left knee flexion and extension. Right hip flexion is 4/5, left is 5/5.  Neuro: No loss of sensation.   Gait: Antalgic- presents in wheelchair.    ASSESSMENT: 62 y.o. year old female with lower back and hip pain, consistent with the following diagnoses:     1. Lumbar radiculopathy  Ambulatory referral/consult to Orthopedics    methocarbamoL (ROBAXIN) 750 MG Tab    HYDROmorphone (DILAUDID) 4 MG tablet    X-Ray Lumbar Complete Including Flex And Ext      2. Osteoarthritis of spine with radiculopathy, lumbar region  HYDROmorphone (DILAUDID) 4 MG tablet      3. Intractable back pain  HYDROmorphone (DILAUDID) 4 MG tablet      4. Sacroiliac dysfunction  HYDROmorphone (DILAUDID) 4 MG tablet      5. Pain of right hip  HYDROmorphone (DILAUDID) 4 MG tablet                  PLAN:      - Previous imaging was reviewed and discussed with the patient today.    - Referral to spine surgery given acute worsening of radicular symptoms (Dr. Mosqueda)    - She is scheduled for right then left L3,4,5 RFAs, 2 weeks apart.    - Has MRI with sports medicine scheduled for HIP    - Has MRI lumbar ordered     - Hydromorphone PO 2mg TID prn ordered    - Increase Robaxin to 750 QID    - TENS unit provided    - salonpas patches    RTC following RFA procedure      The above plan and management options were discussed at length with patient. Patient is in agreement with the above and verbalized understanding.    Gordon Jose  05/24/2023        This encounter took at least 4o minutes spent in chart review, history, physical, image, assessment and plan discussion.    I have personally taken the history and examined this patient and agree with the resident's note as stated above.

## 2023-05-24 NOTE — TELEPHONE ENCOUNTER
"----- Message from Consuelo Bobo sent at 5/24/2023 10:28 AM CDT -----  Regarding: Medication  Refill  Request                Reply in [facility]: NO      Please refill the medication listed below. Patient states, "she's in extreme pain and would like to have her procedure moved up to a sooner day and time."  Please call the patient  [phone] (I)         Medication     oxyCODONE-acetaminophen (PERCOCET) 5-325 mg per tablet           Preferred Pharmacy:  [facility]   Phone: [phone]  Fax:  [phone]              "

## 2023-05-24 NOTE — TELEPHONE ENCOUNTER
Spoke c [facility] anesthesia. Ok to added additional MRI. No new case request is needed.     Left voicemail for MRI  & sent Teams message to Shara SHIRLEY to add R hip MRI to schedule on 05/29/23.

## 2023-05-25 ENCOUNTER — PATIENT MESSAGE (OUTPATIENT)
Dept: FAMILY MEDICINE | Facility: CLINIC | Age: 63
End: 2023-05-25
Payer: MEDICARE

## 2023-05-25 NOTE — TELEPHONE ENCOUNTER
Iron is a little low (oral iron supplement may be helpful if not already taking) but otherwise labs are ok; nothing to give a reason for the welling in the legs other than the dependent edema as we discussed at visit.

## 2023-05-26 ENCOUNTER — PATIENT MESSAGE (OUTPATIENT)
Dept: SPORTS MEDICINE | Facility: CLINIC | Age: 63
End: 2023-05-26
Payer: MEDICARE

## 2023-05-26 ENCOUNTER — TELEPHONE (OUTPATIENT)
Dept: PAIN MEDICINE | Facility: CLINIC | Age: 63
End: 2023-05-26
Payer: MEDICARE

## 2023-05-26 ENCOUNTER — PATIENT MESSAGE (OUTPATIENT)
Dept: PAIN MEDICINE | Facility: CLINIC | Age: 63
End: 2023-05-26
Payer: MEDICARE

## 2023-05-26 ENCOUNTER — PATIENT MESSAGE (OUTPATIENT)
Dept: PAIN MEDICINE | Facility: OTHER | Age: 63
End: 2023-05-26
Payer: MEDICARE

## 2023-05-26 DIAGNOSIS — Z98.890 STATUS POST LEFT FOOT SURGERY: Primary | ICD-10-CM

## 2023-05-26 NOTE — TELEPHONE ENCOUNTER
----- Message from Consuelo Bobo sent at 5/26/2023  7:11 AM CDT -----  Regarding: Patient Advice                    Name of Who is Calling:  Joyce Peterson    Who Left The Message:  Joyce Peterson      What is the request in detail:    Patient called requesting a call pertaining to the extreme pain she's presently experiencing.   Please further advise.   Thank you       Reply by [facility]:  NO      Preferred Call Back : [phone] (m)

## 2023-05-26 NOTE — PRE-PROCEDURE INSTRUCTIONS
PreOp Instructions given:   - Verbal medication information (what to hold and what to take)   - NPO guidelines [id number]  - Arrival place directions given; time to be given the day before procedure by the  POC - [address] [facility] [place]  - Bathing with antibacterial soap   - Don't wear any jewelry or bring any valuables AM of surgery   - No makeup or moisturizer to face   - No perfume/cologne, powder, lotions or aftershave   Pt. verbalized understanding.   Pt denies any h/o Anesthesia/Sedation complications or side effects.    *Pt reports Ordering MD will be adding R hip MRI to procedure*

## 2023-05-26 NOTE — TELEPHONE ENCOUNTER
----- Message from Consuelo Bobo sent at 5/26/2023  7:11 AM CDT -----  Regarding: Patient Advice                    Name of Who is Calling:  Joyce Peterson    Who Left The Message:  Joyce Peterson      What is the request in detail:    Patient called requesting a call pertaining to the extreme pain she's presently experiencing.   Please further advise.   Thank you       Reply by [facility]:  NO      Preferred Call Back : [phone] (m)

## 2023-05-29 ENCOUNTER — HOSPITAL ENCOUNTER (OUTPATIENT)
Dept: RADIOLOGY | Facility: HOSPITAL | Age: 63
Discharge: HOME | End: 2023-05-29
Attending: FAMILY MEDICINE
Payer: MEDICARE

## 2023-05-29 ENCOUNTER — TELEPHONE (OUTPATIENT)
Dept: SPORTS MEDICINE | Facility: CLINIC | Age: 63
End: 2023-05-29
Payer: MEDICARE

## 2023-05-29 ENCOUNTER — ANESTHESIA EVENT (OUTPATIENT)
Dept: ENDOSCOPY | Facility: HOSPITAL | Age: 63
End: 2023-05-29
Payer: MEDICARE

## 2023-05-29 ENCOUNTER — ANESTHESIA (OUTPATIENT)
Dept: ENDOSCOPY | Facility: HOSPITAL | Age: 63
End: 2023-05-29
Payer: MEDICARE

## 2023-05-29 ENCOUNTER — HOSPITAL ENCOUNTER (OUTPATIENT)
Facility: HOSPITAL | Age: 63
Discharge: HOME | End: 2023-05-29
Attending: ORTHOPAEDIC SURGERY | Admitting: ORTHOPAEDIC SURGERY
Payer: MEDICARE

## 2023-05-29 VITALS
DIASTOLIC BLOOD PRESSURE: 77 MMHG | RESPIRATION RATE: 20 BRPM | SYSTOLIC BLOOD PRESSURE: 155 MMHG | HEART RATE: 96 BPM | BODY MASS INDEX: 26.65 KG/M2 | WEIGHT: 141.13 LBS | HEIGHT: 61 IN | OXYGEN SATURATION: 96 % | TEMPERATURE: 98 F

## 2023-05-29 DIAGNOSIS — M48.00 CENTRAL STENOSIS OF SPINAL CANAL: ICD-10-CM

## 2023-05-29 DIAGNOSIS — M25.551 ACUTE RIGHT HIP PAIN: ICD-10-CM

## 2023-05-29 DIAGNOSIS — M54.16 LUMBAR RADICULOPATHY: ICD-10-CM

## 2023-05-29 DIAGNOSIS — M25.551 PAIN OF RIGHT HIP: ICD-10-CM

## 2023-05-29 DIAGNOSIS — S76.011S TEAR OF RIGHT GLUTEUS MEDIUS TENDON, SEQUELA: ICD-10-CM

## 2023-05-29 DIAGNOSIS — M54.16 LUMBAR RADICULOPATHY, CHRONIC: ICD-10-CM

## 2023-05-29 DIAGNOSIS — M16.11 PRIMARY OSTEOARTHRITIS OF RIGHT HIP: ICD-10-CM

## 2023-05-29 DIAGNOSIS — M54.16 LUMBAR RADICULOPATHY, CHRONIC: Primary | ICD-10-CM

## 2023-05-29 PROCEDURE — 63600175 PHARM REV CODE 636 W HCPCS: Performed by: NURSE ANESTHETIST, CERTIFIED REGISTERED

## 2023-05-29 PROCEDURE — D9220A PRA ANESTHESIA: ICD-10-PCS | Mod: CRNA,,, | Performed by: NURSE ANESTHETIST, CERTIFIED REGISTERED

## 2023-05-29 PROCEDURE — D9220A PRA ANESTHESIA: Mod: ANES,,, | Performed by: ANESTHESIOLOGY

## 2023-05-29 PROCEDURE — 73721 MRI JNT OF LWR EXTRE W/O DYE: CPT | Mod: 26,RT,, | Performed by: RADIOLOGY

## 2023-05-29 PROCEDURE — 37000008 HC ANESTHESIA 1ST 15 MINUTES

## 2023-05-29 PROCEDURE — D9220A PRA ANESTHESIA: ICD-10-PCS | Mod: ANES,,, | Performed by: ANESTHESIOLOGY

## 2023-05-29 PROCEDURE — 25000003 PHARM REV CODE 250: Performed by: NURSE ANESTHETIST, CERTIFIED REGISTERED

## 2023-05-29 PROCEDURE — D9220A PRA ANESTHESIA: Mod: CRNA,,, | Performed by: NURSE ANESTHETIST, CERTIFIED REGISTERED

## 2023-05-29 PROCEDURE — 72148 MRI LUMBAR SPINE WITHOUT CONTRAST: ICD-10-PCS | Mod: 26,,, | Performed by: RADIOLOGY

## 2023-05-29 PROCEDURE — 72148 MRI LUMBAR SPINE W/O DYE: CPT | Mod: TC

## 2023-05-29 PROCEDURE — 63600175 PHARM REV CODE 636 W HCPCS: Performed by: ANESTHESIOLOGY

## 2023-05-29 PROCEDURE — 63600175 PHARM REV CODE 636 W HCPCS

## 2023-05-29 PROCEDURE — 72148 MRI LUMBAR SPINE W/O DYE: CPT | Mod: 26,,, | Performed by: RADIOLOGY

## 2023-05-29 PROCEDURE — 73721 MRI HIP WITHOUT CONTRAST RIGHT: ICD-10-PCS | Mod: 26,RT,, | Performed by: RADIOLOGY

## 2023-05-29 PROCEDURE — 37000009 HC ANESTHESIA EA ADD 15 MINS

## 2023-05-29 PROCEDURE — 73721 MRI JNT OF LWR EXTRE W/O DYE: CPT | Mod: TC,RT

## 2023-05-29 PROCEDURE — 71000044 HC DOSC ROUTINE RECOVERY FIRST HOUR

## 2023-05-29 RX ORDER — FENTANYL CITRATE 50 UG/ML
INJECTION, SOLUTION INTRAMUSCULAR; INTRAVENOUS
Status: DISCONTINUED | OUTPATIENT
Start: 2023-05-29 | End: 2023-05-29

## 2023-05-29 RX ORDER — OXYCODONE HYDROCHLORIDE 5 MG/1
5 TABLET ORAL
Status: DISCONTINUED | OUTPATIENT
Start: 2023-05-29 | End: 2023-05-29 | Stop reason: HOSPADM

## 2023-05-29 RX ORDER — HYDROMORPHONE HYDROCHLORIDE 1 MG/ML
0.2 INJECTION, SOLUTION INTRAMUSCULAR; INTRAVENOUS; SUBCUTANEOUS EVERY 5 MIN PRN
Status: DISCONTINUED | OUTPATIENT
Start: 2023-05-29 | End: 2023-05-29 | Stop reason: HOSPADM

## 2023-05-29 RX ORDER — FENTANYL CITRATE 50 UG/ML
25 INJECTION, SOLUTION INTRAMUSCULAR; INTRAVENOUS EVERY 5 MIN PRN
Status: DISCONTINUED | OUTPATIENT
Start: 2023-05-29 | End: 2023-05-29 | Stop reason: HOSPADM

## 2023-05-29 RX ORDER — HALOPERIDOL 5 MG/ML
0.5 INJECTION INTRAMUSCULAR EVERY 10 MIN PRN
Status: DISCONTINUED | OUTPATIENT
Start: 2023-05-29 | End: 2023-05-29 | Stop reason: HOSPADM

## 2023-05-29 RX ORDER — HYDROMORPHONE HYDROCHLORIDE 1 MG/ML
INJECTION, SOLUTION INTRAMUSCULAR; INTRAVENOUS; SUBCUTANEOUS
Status: COMPLETED
Start: 2023-05-29 | End: 2023-05-29

## 2023-05-29 RX ORDER — DEXMEDETOMIDINE HYDROCHLORIDE 100 UG/ML
INJECTION, SOLUTION INTRAVENOUS
Status: DISCONTINUED | OUTPATIENT
Start: 2023-05-29 | End: 2023-05-29

## 2023-05-29 RX ORDER — OXYCODONE HYDROCHLORIDE 5 MG/1
TABLET ORAL
Status: DISCONTINUED
Start: 2023-05-29 | End: 2023-05-29 | Stop reason: HOSPADM

## 2023-05-29 RX ORDER — LIDOCAINE HYDROCHLORIDE 20 MG/ML
INJECTION INTRAVENOUS
Status: DISCONTINUED | OUTPATIENT
Start: 2023-05-29 | End: 2023-05-29

## 2023-05-29 RX ORDER — PROPOFOL 10 MG/ML
VIAL (ML) INTRAVENOUS
Status: DISCONTINUED | OUTPATIENT
Start: 2023-05-29 | End: 2023-05-29

## 2023-05-29 RX ORDER — MIDAZOLAM HYDROCHLORIDE 1 MG/ML
INJECTION, SOLUTION INTRAMUSCULAR; INTRAVENOUS
Status: DISCONTINUED | OUTPATIENT
Start: 2023-05-29 | End: 2023-05-29

## 2023-05-29 RX ORDER — LIDOCAINE HYDROCHLORIDE 10 MG/ML
1 INJECTION, SOLUTION EPIDURAL; INFILTRATION; INTRACAUDAL; PERINEURAL ONCE
Status: CANCELLED | OUTPATIENT
Start: 2023-05-29 | End: 2023-05-29

## 2023-05-29 RX ORDER — PROCHLORPERAZINE EDISYLATE 5 MG/ML
5 INJECTION INTRAMUSCULAR; INTRAVENOUS EVERY 30 MIN PRN
Status: DISCONTINUED | OUTPATIENT
Start: 2023-05-29 | End: 2023-05-29 | Stop reason: HOSPADM

## 2023-05-29 RX ORDER — PROPOFOL 10 MG/ML
VIAL (ML) INTRAVENOUS CONTINUOUS PRN
Status: DISCONTINUED | OUTPATIENT
Start: 2023-05-29 | End: 2023-05-29

## 2023-05-29 RX ADMIN — LIDOCAINE HYDROCHLORIDE 20 MG: 20 INJECTION INTRAVENOUS at 02:05

## 2023-05-29 RX ADMIN — MIDAZOLAM HYDROCHLORIDE 1 MG: 1 INJECTION, SOLUTION INTRAMUSCULAR; INTRAVENOUS at 01:05

## 2023-05-29 RX ADMIN — PROPOFOL 50 MG: 10 INJECTION, EMULSION INTRAVENOUS at 02:05

## 2023-05-29 RX ADMIN — DEXMEDETOMIDINE HYDROCHLORIDE 12 MCG: 100 INJECTION, SOLUTION INTRAVENOUS at 02:05

## 2023-05-29 RX ADMIN — Medication 75 MCG/KG/MIN: at 02:05

## 2023-05-29 RX ADMIN — HYDROMORPHONE HYDROCHLORIDE 0.2 MG: 1 INJECTION, SOLUTION INTRAMUSCULAR; INTRAVENOUS; SUBCUTANEOUS at 03:05

## 2023-05-29 RX ADMIN — SODIUM CHLORIDE: 0.9 INJECTION, SOLUTION INTRAVENOUS at 12:05

## 2023-05-29 RX ADMIN — FENTANYL CITRATE 50 MCG: 50 INJECTION, SOLUTION INTRAMUSCULAR; INTRAVENOUS at 02:05

## 2023-05-29 RX ADMIN — FENTANYL CITRATE 50 MCG: 50 INJECTION, SOLUTION INTRAMUSCULAR; INTRAVENOUS at 01:05

## 2023-05-29 NOTE — PLAN OF CARE
Patient tolerated procedure/anesthesia well, vss, no complications or concerns. Patient pain back to preop baseline, patient denies nausea, and tolerates PO intake. Consents with chart. RN reviewed discharge instructions with patient and spouse at bedside,verbalized understanding.  Patient left via wheelchair.

## 2023-05-29 NOTE — ANESTHESIA PREPROCEDURE EVALUATION
05/29/2023  Joyce Peterson is a 62 y.o., female.      Pre-op Assessment    I have reviewed the Patient Summary Reports.    I have reviewed the NPO Status.      Review of Systems  Anesthesia Hx:  No problems with previous Anesthesia  History of prior surgery of interest to airway management or planning: Denies Family Hx of Anesthesia complications.   Denies Personal Hx of Anesthesia complications.   Cardiovascular:   CAD      Pulmonary:   Asthma    Hepatic/GI:   GERD    Musculoskeletal:   Arthritis     Neurological:   Neuromuscular Disease,    Endocrine:   Hyperthyroidism        Physical Exam  General: Alert, Cooperative and Oriented    Airway:  Mallampati: II   Mouth Opening: Normal  TM Distance: Normal  Tongue: Normal  Neck ROM: Normal ROM    Chest/Lungs:  Normal Respiratory Rate    Heart:  Rate: Normal        Anesthesia Plan  Type of Anesthesia, risks & benefits discussed:    Anesthesia Type: Gen Natural Airway, MAC, Gen Supraglottic Airway, Gen ETT  Intra-op Monitoring Plan: Standard ASA Monitors  Induction:  IV  Informed Consent: Informed consent signed with the Patient and all parties understand the risks and agree with anesthesia plan.  All questions answered.   ASA Score: 3  Day of Surgery Review of History & Physical: H&P completed by Anesthesiologist.    Ready For Surgery From Anesthesia Perspective.     .

## 2023-05-29 NOTE — TELEPHONE ENCOUNTER
Dr. Díaz reviewed lumbar & R hip MRI 05/29/23. He is referring pt to Dr. Mosqueda for L4/L5 severe central canal narrowing & foraminal encroachment. Ok to review results c pt over phone & get her scheduled as above. Appt 05/30/23 can be cancelled. Referral to Dr. Mosqueda placed.     ==  Attempted to contact pt. Left voicemail. Called to review results of lumbar & R hip MRI 05/29/23. Asked pt to return call to clinic at [phone].

## 2023-05-29 NOTE — TRANSFER OF CARE
"Anesthesia Transfer of Care Note    Patient: Joyce Peterson    Procedure(s) Performed: Procedure(s) (LRB):  MRI (Magnetic Resonance Imagine) (N/A)    Patient location: PACU    Anesthesia Type: MAC    Transport from OR: Transported from OR on room air with adequate spontaneous ventilation    Post pain: adequate analgesia    Post assessment: no apparent anesthetic complications and tolerated procedure well    Post vital signs: stable    Level of consciousness: awake, alert and oriented    Nausea/Vomiting: no nausea/vomiting    Complications: none    Transfer of care protocol was followed      Last vitals:   Visit Vitals  BP (!) 153/96   Pulse 101   Temp 36.5 °C (97.7 °F) (Temporal)   Resp 20   Ht 5' 1" (1.549 m)   Wt 64 kg (141 lb 1.5 oz)   LMP  (LMP Unknown)   SpO2 95%   Breastfeeding No   BMI 26.66 kg/m²     "

## 2023-05-30 ENCOUNTER — PATIENT MESSAGE (OUTPATIENT)
Dept: RHEUMATOLOGY | Facility: CLINIC | Age: 63
End: 2023-05-30
Payer: MEDICARE

## 2023-05-30 ENCOUNTER — TELEPHONE (OUTPATIENT)
Dept: SPORTS MEDICINE | Facility: CLINIC | Age: 63
End: 2023-05-30
Payer: MEDICARE

## 2023-05-30 ENCOUNTER — TELEPHONE (OUTPATIENT)
Dept: RHEUMATOLOGY | Facility: CLINIC | Age: 63
End: 2023-05-30
Payer: MEDICARE

## 2023-05-30 DIAGNOSIS — Z79.899 ENCOUNTER FOR MONITORING DENOSUMAB THERAPY: Primary | ICD-10-CM

## 2023-05-30 DIAGNOSIS — M47.26 OSTEOARTHRITIS OF SPINE WITH RADICULOPATHY, LUMBAR REGION: Primary | ICD-10-CM

## 2023-05-30 DIAGNOSIS — Z51.81 ENCOUNTER FOR MONITORING DENOSUMAB THERAPY: Primary | ICD-10-CM

## 2023-05-30 NOTE — ANESTHESIA POSTPROCEDURE EVALUATION
Discharge Summary    and    Anesthesia Post Evaluation    Patient: Joyce Peterson    Procedure(s) Performed: Procedure(s) (LRB):  MRI (Magnetic Resonance Imagine) (N/A)      Ordering Provider:  Bre CANCINO   Discharge Provider:  Feroz CANCINO     Discharge condition: Lumbar radiculopathy, chronic  Hospital Course:  No significant events   Consults: None  Significant diagnostic studies:   Discharge Orders: as per home regimen  Disposition: Home             Final Anesthesia Type: general      Patient location during evaluation: PACU  Patient participation: Yes- Able to Participate  Level of consciousness: awake and alert  Post-procedure vital signs: reviewed and stable  Pain management: adequate  Airway patency: patent    PONV status at discharge: No PONV  Anesthetic complications: no      Cardiovascular status: blood pressure returned to baseline  Respiratory status: room air  Hydration status: euvolemic  Follow-up not needed.          Vitals Value Taken Time   /86 05/29/23 1547   Temp 36.5 °C (97.7 °F) 05/29/23 1514   Pulse 98 05/29/23 1557   Resp 20 05/29/23 1535   SpO2 94 % 05/29/23 1557   Vitals shown include unvalidated device data.      No case tracking events are documented in the log.      Pain/Isacc Score: Pain Rating Prior to Med Admin: 10 (5/29/2023  3:35 PM)  Isacc Score: 10 (5/29/2023  4:03 PM)         Medication List      ASK your doctor about these medications    albuterol 90 mcg/actuation inhaler  Commonly known as: PROVENTIL/VENTOLIN HFA  Inhale 2 puffs into the lungs every 6 (six) hours as needed. Rescue     albuterol-ipratropium 2.5 mg-0.5 mg/3 mL nebulizer solution  Commonly known as: DUO-NEB  Take 3 mLs by nebulization every 6 (six) hours as needed for Wheezing. Rescue     ALPRAZolam 0.5 MG tablet  Commonly known as: XANAX  Take once on call to procedure.     aspirin 325 MG tablet  Take 1 tablet (325 mg total) by mouth once daily.     budesonide-formoterol 160-4.5 mcg 160-4.5  mcg/actuation Hfaa  Commonly known as: SYMBICORT  Inhale 2 puffs into the lungs every 12 (twelve) hours.     calcium citrate-vitamin D3 315-200 mg 315 mg-5 mcg (200 unit) per tablet  Commonly known as: CITRACAL+D     ciprofloxacin-dexAMETHasone 0.3-0.1% 0.3-0.1 % Drps  Commonly known as: CIPRODEX  Place 4 drops into the left ear 2 (two) times daily. for 10 days  Ask about: Should I take this medication?     cycloSPORINE 0.05 % ophthalmic emulsion  Commonly known as: RESTASIS  Instill one drop into both eyes two times per day     diazePAM 10 MG Tab  Commonly known as: VALIUM  Take 1 tablet (10 mg total) by mouth once. for 1 dose     diclofenac sodium 1 % Gel  Commonly known as: VOLTAREN  APPLY 2 GRAMS TOPICALLY 4 (FOUR) TIMES DAILY AS NEEDED.     DUOBRII 0.01-0.045 % Lotn  Generic drug: halobetasol propion-tazarotene  aaa on feet and hands qhs  then vaseline and warm towel     * fluconazole 150 MG Tab  Commonly known as: DIFLUCAN     * fluconazole 100 MG tablet  Commonly known as: DIFLUCAN  Take 1 tablet by mouth once daily for 14 days.     furosemide 20 MG tablet  Commonly known as: LASIX  Take 1 tablet (20 mg total) by mouth once daily.     HYDROmorphone 4 MG tablet  Commonly known as: DILAUDID  Take 0.5 tablets (2 mg total) by mouth every 8 (eight) hours as needed for Pain.     INV PROPULSID 10 MG  Take 2 tablets (20 mg) by mouth 4 (four) times daily. FOR INVESTIGATIONAL USE ONLY. Protocol CIS-USA-154  Subject ID: [id number].     ketoconazole 2 % cream  Commonly known as: NIZORAL  Apply to feet twice daily for 3 weeks     KEVZARA 200 mg/1.14 mL Syrg  Generic drug: sarilumab  Inject 1.14 mL (200 mg total) into the skin every 14 (fourteen) days.     leflunomide 20 MG Tab  Commonly known as: ARAVA  Take 1 tablet (20 mg total) by mouth once daily.     magic mouthwash diphen/antac/lidoc  Swish and Spit 5 mL by mouth for 30 seconds twice daily.     methenamine 1 gram Tab  Commonly known as: HIPREX  Take 1 tablet (1 g total)  by mouth 2 (two) times daily.     * methocarbamoL 750 MG Tab  Commonly known as: ROBAXIN  Take 1 tablet (750 mg total) by mouth 3 (three) times daily.     * methocarbamoL 750 MG Tab  Commonly known as: ROBAXIN  Take 1 tablet (750 mg total) by mouth 4 (four) times daily.     montelukast 10 mg tablet  Commonly known as: SINGULAIR  Take 1 tablet (10 mg total) by mouth every evening.     mupirocin 2 % ointment  Commonly known as: BACTROBAN  Apply to nose twice daily for 14 days     MYRBETRIQ 25 mg Tb24 ER tablet  Generic drug: mirabegron  Take 1 tablet (25 mg total) by mouth once daily.     naproxen 500 MG tablet  Commonly known as: NAPROSYN  Take 1 tablet (500 mg total) by mouth 2 (two) times daily as needed (prn).     omeprazole 40 MG capsule  Commonly known as: PRILOSEC  Take 1 capsule (40 mg total) by mouth every morning.     omeprazole-sodium bicarbonate 40-1.1 mg-gram per capsule  Commonly known as: ZEGERID  TAKE 1 CAPSULE BY MOUTH EVERY DAY IN THE MORNING     oxyCODONE-acetaminophen 5-325 mg per tablet  Commonly known as: PERCOCET  Take 1 tablet by mouth every 8 (eight) hours as needed for Pain.     potassium chloride SA 20 MEQ tablet  Commonly known as: K-DUR,KLOR-CON  Take 1 tablet (20 mEq total) by mouth once daily. Take with lasix     POTASSIUM ORAL     * predniSONE 5 MG tablet  Commonly known as: DELTASONE  Take 1 tablet (5 mg total) by mouth once daily.     * predniSONE 1 MG tablet  Commonly known as: DELTASONE  TAKE 5 TABLETS (5 MG TOTAL) BY MOUTH ONCE DAILY.     pregabalin 100 MG capsule  Commonly known as: LYRICA  Take 1 capsule (100 mg total) by mouth 3 (three) times daily.     * PREMARIN 0.625 MG tablet  Generic drug: estrogens (conjugated)  take 1 tablet by mouth daily     * PREMARIN vaginal cream  Generic drug: conjugated estrogens  PLACE 0.5 GRAM VAGINALLY 3 (THREE) TIMES A WEEK.     promethazine 25 MG tablet  Commonly known as: PHENERGAN  Take 1 tablet (25 mg total) by mouth every 6 (six) hours as  "needed for Nausea.     rosuvastatin 20 MG tablet  Commonly known as: CRESTOR  Take 1 tablet (20 mg total) by mouth every evening.     * sucralfate 1 gram tablet  Commonly known as: CARAFATE  Take 1 tablet (1 g total) by mouth 4 (four) times daily.     * sucralfate 1 gram tablet  Commonly known as: CARAFATE  TAKE 1 TABLET (1 G TOTAL) BY MOUTH 4 (FOUR) TIMES DAILY.     XIIDRA 5 % Dpet  Generic drug: lifitegrast  INSTILL ONE DROP INTO BOTH EYES TWICE A DAY         * This list has 10 medication(s) that are the same as other medications prescribed for you. Read the directions carefully, and ask your doctor or other care provider to review them with you.                Discharge instructions - Please return to clinic (contact PCP, pediatrician etc..) if:  1) Persistent cough.  2) Respiratory difficulty (including: noisy breathing, nasal flaring, "barky" cough or wheezing).  3) Persistent pain not responsive to prescribed medications (if any).  4) Change in current mental status (age appropriate).  5) Repeating or recurrent episodes of vomiting.  6) Inability to tolerate oral fluids.    "

## 2023-05-30 NOTE — TELEPHONE ENCOUNTER
"Spoke c pt. Reviewed R hip & lumbar MRI results per Dr. Díaz. Pt has been scheduled to see Dr. Caputo on 06/30/23. Advised pt that I will reach out to Dr. Mosqueda's staff for sooner appt. Pt does state that she has point tenderness in area "where leg meets the hip". She states that she go no relief from recent injections c Dr. Díaz on 05/02/23 & 05/17/23. She sates that her pain actually worsened following CSIs. Pt also has R hip nerve block on 06/22/23 & RFA L3-L5  07/20/23 c Dr. Mayfield. Advised pt I would reach out to Dr. Mayfield's team for recommendations/sooner treatment based on recent MRI findings. Pt expressed gratitude for getting her set up for sedation in order to complete MRIs. Cancelled today's f/u appt c Dr. Díaz. Advised that if I have any information regarding sooner appts c Dr. Mosqueda or Dr. Mayfield, I will let her know otherwise, seh should be hearing from their staffs directly. Pt will call c additional questions/concerns in interim. Pt expressed understanding & was thankful.    "

## 2023-05-30 NOTE — TELEPHONE ENCOUNTER
Please ask pt to increase Citracal with D to 2 tabs twice daily and schedule repeat calcium in 6/523 prior to Prolia scheduled for 6/7/23. Thank you MEGA

## 2023-05-31 ENCOUNTER — PATIENT MESSAGE (OUTPATIENT)
Dept: RHEUMATOLOGY | Facility: CLINIC | Age: 63
End: 2023-05-31
Payer: MEDICARE

## 2023-06-01 ENCOUNTER — TELEPHONE (OUTPATIENT)
Dept: PAIN MEDICINE | Facility: CLINIC | Age: 63
End: 2023-06-01
Payer: MEDICARE

## 2023-06-01 ENCOUNTER — HOSPITAL ENCOUNTER (OUTPATIENT)
Dept: RADIOLOGY | Facility: HOSPITAL | Age: 63
Discharge: HOME | End: 2023-06-01
Attending: ORTHOPAEDIC SURGERY
Payer: MEDICARE

## 2023-06-01 ENCOUNTER — OFFICE VISIT (OUTPATIENT)
Dept: ORTHOPEDICS | Facility: CLINIC | Age: 63
End: 2023-06-01
Payer: MEDICARE

## 2023-06-01 DIAGNOSIS — Z98.890 STATUS POST LEFT FOOT SURGERY: ICD-10-CM

## 2023-06-01 DIAGNOSIS — M20.42 HAMMER TOE OF LEFT FOOT: ICD-10-CM

## 2023-06-01 DIAGNOSIS — Z98.890 STATUS POST LEFT FOOT SURGERY: Primary | ICD-10-CM

## 2023-06-01 PROCEDURE — 1159F PR MEDICATION LIST DOCUMENTED IN MEDICAL RECORD: ICD-10-PCS | Mod: CPTII,S$GLB,, | Performed by: ORTHOPAEDIC SURGERY

## 2023-06-01 PROCEDURE — 99214 OFFICE O/P EST MOD 30 MIN: CPT | Mod: S$GLB,,, | Performed by: ORTHOPAEDIC SURGERY

## 2023-06-01 PROCEDURE — 99999 PR PBB SHADOW E&M-EST. PATIENT-LVL III: CPT | Mod: PBBFAC,,, | Performed by: ORTHOPAEDIC SURGERY

## 2023-06-01 PROCEDURE — 73630 X-RAY EXAM OF FOOT: CPT | Mod: TC,LT

## 2023-06-01 PROCEDURE — 73630 X-RAY EXAM OF FOOT: CPT | Mod: 26,LT,, | Performed by: RADIOLOGY

## 2023-06-01 PROCEDURE — 1159F MED LIST DOCD IN RCRD: CPT | Mod: CPTII,S$GLB,, | Performed by: ORTHOPAEDIC SURGERY

## 2023-06-01 PROCEDURE — 99214 PR OFFICE/OUTPT VISIT, EST, LEVL IV, 30-39 MIN: ICD-10-PCS | Mod: S$GLB,,, | Performed by: ORTHOPAEDIC SURGERY

## 2023-06-01 PROCEDURE — 99999 PR PBB SHADOW E&M-EST. PATIENT-LVL III: ICD-10-PCS | Mod: PBBFAC,,, | Performed by: ORTHOPAEDIC SURGERY

## 2023-06-01 PROCEDURE — 73630 XR FOOT COMPLETE 3 VIEW LEFT: ICD-10-PCS | Mod: 26,LT,, | Performed by: RADIOLOGY

## 2023-06-01 NOTE — PROGRESS NOTES
Subjective:   Chief complaint: Follow-up left foot  New complaint: worsening back and buttock pain    1/11/23 left triple arthrodesis, midfoot arthrodesis, ARLEN, CBG    HPI:   Joyce Peterson is a 62 y.o. female who presents today for follow-up.  Pain is 0/10, swelling.  Notes foot is doing well without issues.  2 complaints today 1.) worsening hammertoes and 2.) worsening back and buttock pain- no saddle anesthesia, no bowel/bladder.  She is taking dilaudid PO for the pain.    She still has small persistent wound of the left foot that is slowly improving.  Concerned as well about possible indentation.  Drainage is decreasing.    ROS:  Musculoskeletal: per HPI     Objective:   Exam:  There were no vitals filed for this visit.  General: No acute distress, well-appearing  Musculoskeletal: Small persistent opening the sinus tarsi with granulation base.  Foot neutral.  Incision otherwise benign.    Imaging:  Radiographs were ordered and independently interpreted by me.    Standing 3v foot demonstrates stable alignment of reconstruction without evidence of hardware failure or loosening.     MRI reviewed and shows L5-S1 collapse and anterolisthesis with severe central stenosis.  Flex/ex lumbar spine x-rays show no motion at the slip site.         Assessment:     1. Status post left foot surgery    2. Hammer toe of left foot         Patient is seen for a new problem and a postop visit (>90 days postop) with ADLs and/or QOL likely to be impacted without treatment.    Data:  2 results independently interpreted    Treatment plan: Low risk of morbidity from treatment plan     5 months postop doing well from a foot reconstruction standpoint.    Regarding spine, she has fairly significant L5-S1 slip with significant stenosis.  I have reviewed her case and she has an appointment with Dr. Mosqueda upcoming.    Regarding hammertoes, I discussed this is likely sequela of her foot reconstruction and tenodesis effect.  Unless they  become bothersome, I would recommend against reconstruction.     Plan:       --continue za every 2-3 days until healed, low threshold to place wound care consult if wound stalls  --spine consult already placed  --f/up with Dr. Steele in 3 months with 3v foot standing    No orders of the defined types were placed in this encounter.      Past Medical History:   Diagnosis Date    Acid reflux     Allergy     Anemia     Asthma     Coronary artery disease     CS (cervical spondylosis) 03/08/2013    Degenerative disc disease     Degenerative joint disease of hindfoot, left 6/28/2022    Dry eyes     Dry mouth     Gastroparesis     History of methotrexate therapy 01/19/2022    Hyperlipidemia     Lateral meniscus derangement 04/06/2016    Lobular carcinoma in situ     Lumbar spondylosis 03/08/2013    Osteoarthritis     Osteoporosis     Pes planovalgus, acquired, left 6/28/2022    Rheumatoid arthritis(714.0)     Rupture of left triceps tendon 10/17/2018    Umbilical hernia 08/13/2015       Past Surgical History:   Procedure Laterality Date    BREAST BIOPSY Left 01/29/2002    core bx    CARPAL TUNNEL RELEASE Right 05/2017    CHOLECYSTECTOMY  2004    COLONOSCOPY      10/11    COLONOSCOPY N/A 6/29/2017    Procedure: COLONOSCOPY;  Surgeon: López Moore MD;  Location: [facility] ENDO ([facility] FLR);  Service: Endoscopy;  Laterality: N/A;    EPIDURAL STEROID INJECTION N/A 11/18/2021    Procedure: INJECTION, STEROID, EPIDURAL, L5-S1 IL NEED CONSENT;  Surgeon: Tj Mayfield MD;  Location: [facility] PAIN MGT;  Service: Pain Management;  Laterality: N/A;    EPIDURAL STEROID INJECTION N/A 9/29/2022    Procedure: LUMBAR L3/L4 IL MADELINE CONTRAST;  Surgeon: Tj Mayfield MD;  Location: [facility] PAIN MGT;  Service: Pain Management;  Laterality: N/A;    EPIDURAL STEROID INJECTION N/A 12/12/2022    Procedure: INJECTION, STEROID, EPIDURAL L5/S1 IL CONTRAST;  Surgeon: Tj Mayfield MD;  Location: [facility] PAIN MGT;  Service: Pain Management;  Laterality: N/A;     EPIDURAL STEROID INJECTION N/A 4/6/2023    Procedure: INJECTION, STEROID, EPIDURAL L5/S1;  Surgeon: Tj Mayfield MD;  Location: [facility] PAIN MGT;  Service: Pain Management;  Laterality: N/A;    FOOT ARTHRODESIS Left 1/11/2023    Procedure: FUSION, FOOT;  Surgeon: Ariella Finnegan MD;  Location: [facility] OR;  Service: Orthopedics;  Laterality: Left;  [facility]    HARVESTING OF BONE GRAFT Left 1/11/2023    Procedure: SURGICAL PROCUREMENT, BONE GRAFT;  Surgeon: Ariella Finengan MD;  Location: [facility] OR;  Service: Orthopedics;  Laterality: Left;    INJECTION OF ANESTHETIC AGENT AROUND NERVE Bilateral 8/23/2021    Procedure: BLOCK, NERVE, MEDIAL BRANCH L3,L4,L5;  Surgeon: Tj Mayfield MD;  Location: [facility] PAIN MGT;  Service: Pain Management;  Laterality: Bilateral;  1 of 2    INJECTION OF ANESTHETIC AGENT AROUND NERVE Bilateral 8/26/2021    Procedure: BLOCK, NERVE, MEDIAL BRANCH L3,L4,L5;  Surgeon: Tj Mayfield MD;  Location: [facility] PAIN MGT;  Service: Pain Management;  Laterality: Bilateral;  2 of 2    INJECTION OF ANESTHETIC AGENT AROUND NERVE Left 7/7/2022    Procedure: BLOCK, NERVE, LEFT OBTURATOR AND FEMORAL;  Surgeon: Tj Mayfield MD;  Location: [facility] PAIN MGT;  Service: Pain Management;  Laterality: Left;    INJECTION OF FACET JOINT Bilateral 3/12/2020    Procedure: FACET JOINT INJECTION (LUMBAR BLOCK) BILATERAL L4-5 AND L5-S1 DIRECT REFERRAL;  Surgeon: Tj Mayfield MD;  Location: [facility] PAIN MGT;  Service: Pain Management;  Laterality: Bilateral;  NEEDS CONSENT    INJECTION OF FACET JOINT Bilateral 3/29/2021    Procedure: INJECTION, FACET JOINT L3/4, L4/5, L5/S1;  Surgeon: Tj Mayfield MD;  Location: [facility] PAIN MGT;  Service: Pain Management;  Laterality: Bilateral;    INJECTION OF JOINT Right 7/30/2020    Procedure: INJECTION, JOINT, RIGHT HIP Interarticular under flouro;  Surgeon: Tj Mayfield MD;  Location: [facility] PAIN MGT;  Service: Pain Management;  Laterality: Right;  INJECTION, JOINT, RIGHT HIP Interarticular under flouro     INJECTION OF JOINT Right 2/21/2022    Procedure: Injection, Joint RIGHT ISCHIAL BURSA;  Surgeon: Tj Mayfield MD;  Location: [facility] PAIN MGT;  Service: Pain Management;  Laterality: Right;    INTRAMEDULLARY RODDING OF FEMUR Left 5/21/2019    Procedure: INSERTION, INTRAMEDULLARY ARIEL, FEMUR;  Surgeon: López Duff MD;  Location: [address] FLR;  Service: Orthopedics;  Laterality: Left;    LENGTHENING OF TENDON OF LOWER EXTREMITY Left 1/11/2023    Procedure: LENGTHENING, TENDON, LOWER EXTREMITY;  Surgeon: Ariella Finnegan MD;  Location: [facility] OR;  Service: Orthopedics;  Laterality: Left;    MAGNETIC RESONANCE IMAGING N/A 5/29/2023    Procedure: MRI (Magnetic Resonance Imagine);  Surgeon: Sujey Surgeon;  Location: [facility] SUJEY;  Service: Anesthesiology;  Laterality: N/A;    RADIOFREQUENCY ABLATION Left 9/20/2021    Procedure: RADIOFREQUENCY ABLATION left L3,4,5 RFA  1 of 2  CONSENT NEEDED;  Surgeon: Tj Mayfield MD;  Location: [facility] PAIN MGT;  Service: Pain Management;  Laterality: Left;    RADIOFREQUENCY ABLATION Right 10/4/2021    Procedure: RADIOFREQUENCY ABLATION  right L3,4,5 RFA   2 of 2  CONSENT NEEDED;  Surgeon: Tj Mayfield MD;  Location: [facility] PAIN MGT;  Service: Pain Management;  Laterality: Right;    RADIOFREQUENCY ABLATION Left 4/21/2022    Procedure: Radiofrequency Ablation LEFT L3,L4,L5;  Surgeon: Tj Mayfield MD;  Location: [facility] PAIN MGT;  Service: Pain Management;  Laterality: Left;    RADIOFREQUENCY ABLATION Right 5/12/2022    Procedure: Radiofrequency Ablation RIGHT L3,L4,L5;  Surgeon: Tj Mayfield MD;  Location: [facility] PAIN MGT;  Service: Pain Management;  Laterality: Right;    RADIOFREQUENCY ABLATION Left 7/25/2022    Procedure: Radiofrequency Ablation Left Femoral & Obturator;  Surgeon: Tj Mayfield MD;  Location: [facility] PAIN MGT;  Service: Pain Management;  Laterality: Left;    REPAIR OF TRICEPS TENDON Left 10/17/2018    Procedure: REPAIR, TENDON, TRICEPS left elbow;  Surgeon: Staci Yarbrough MD;   Location: [facility] OR 1ST FLR;  Service: Orthopedics;  Laterality: Left;  Anesthesia: General and regional. PRONE, k-wire , hand pan 1 and pan 2, CALL ARTHREX/Tamera notified 10-12 LO    TONSILLECTOMY      TRANSFORAMINAL EPIDURAL INJECTION OF STEROID Right 8/31/2020    Procedure: INJECTION, STEROID, EPIDURAL, TRANSFORAMINAL,  APPROACH, L3-L4 and L4-L5 need consent;  Surgeon: Tj Mayfield MD;  Location: [facility] PAIN MGT;  Service: Pain Management;  Laterality: Right;    TRANSFORAMINAL EPIDURAL INJECTION OF STEROID Bilateral 7/23/2021    Procedure: INJECTION, STEROID, EPIDURAL, TRANSFORAMINAL APPROACH L4/5;  Surgeon: Tj Mayfield MD;  Location: [facility] PAIN MGT;  Service: Pain Management;  Laterality: Bilateral;    TRIGGER POINT INJECTION N/A 7/23/2021    Procedure: INJECTION, TRIGGER POINT SCAPULAR;  Surgeon: Tj Mayfield MD;  Location: [facility] PAIN MGT;  Service: Pain Management;  Laterality: N/A;    TUBAL LIGATION  2003    UPPER GASTROINTESTINAL ENDOSCOPY      10/11    uterine ablation  2003       Family History   Problem Relation Age of Onset    Cancer Mother         Lung Cancer    Emphysema Mother     Heart attack Mother     Alcohol abuse Mother     Cancer Father         Lung Cancer    Osteoarthritis Father     Lung cancer Father     Skin cancer Father     Alcohol abuse Father     Heart disease Brother     Heart attack Brother     Alcohol abuse Brother     Cirrhosis Brother     Osteoarthritis Paternal Aunt     Retinal detachment Maternal Aunt     No Known Problems Sister     No Known Problems Maternal Uncle     No Known Problems Paternal Uncle     No Known Problems Maternal Grandmother     No Known Problems Maternal Grandfather     No Known Problems Paternal Grandmother     No Known Problems Paternal Grandfather     Colon cancer Neg Hx     Esophageal cancer Neg Hx     Stomach cancer Neg Hx     Celiac disease Neg Hx     Diabetes Neg Hx     Thyroid disease Neg Hx     Amblyopia Neg Hx     Blindness Neg Hx     Cataracts  Neg Hx     Glaucoma Neg Hx     Hypertension Neg Hx     Macular degeneration Neg Hx     Strabismus Neg Hx     Stroke Neg Hx        Social History     Socioeconomic History    Marital status:    Tobacco Use    Smoking status: Never    Smokeless tobacco: Never   Substance and Sexual Activity    Alcohol use: Yes     Comment: 2-3 times per year.    Drug use: No    Sexual activity: Yes     Partners: Male     Birth control/protection: Surgical     Social Determinants of Health     Financial Resource Strain: Low Risk     Difficulty of Paying Living Expenses: Not hard at all   Food Insecurity: Unknown    Worried About Running Out of Food in the Last Year: Patient refused    Ran Out of Food in the Last Year: Patient refused   Transportation Needs: Unknown    Lack of Transportation (Medical): Patient refused    Lack of Transportation (Non-Medical): Patient refused   Physical Activity: Unknown    Days of Exercise per Week: 2 days   Stress: No Stress Concern Present    Feeling of Stress : Not at all   Social Connections: Unknown    Frequency of Communication with Friends and Family: Twice a week    Frequency of Social Gatherings with Friends and Family: Twice a week    Active Member of Clubs or Organizations: Yes    Attends Club or Organization Meetings: Patient refused    Marital Status:    Housing Stability: Unknown    Unable to Pay for Housing in the Last Year: No    Unstable Housing in the Last Year: No

## 2023-06-01 NOTE — TELEPHONE ENCOUNTER
Staff lvm in regards to r/s appt on 8/3/23 with Valarie Suárez Np on the 9th floor [address] due to her being unavailable that date.

## 2023-06-02 ENCOUNTER — PATIENT MESSAGE (OUTPATIENT)
Dept: PAIN MEDICINE | Facility: OTHER | Age: 63
End: 2023-06-02
Payer: MEDICARE

## 2023-06-02 ENCOUNTER — TELEPHONE (OUTPATIENT)
Dept: PAIN MEDICINE | Facility: CLINIC | Age: 63
End: 2023-06-02
Payer: MEDICARE

## 2023-06-02 NOTE — TELEPHONE ENCOUNTER
----- Message from Alba Delarosa sent at 6/2/2023  1:41 PM CDT -----  Name of Who is Calling:TONNY GOMEZ [[id number]]           What is the request in detail:pt stated that she has been calling this morning to get some information in regards to an appt.Please contact to further discuss and advise.          Can the clinic reply by [facility]:[phone]           [facility][phone]    
Staff lvm to r/s a follow up with Valarie.  
no

## 2023-06-02 NOTE — TELEPHONE ENCOUNTER
----- Message from Cherise Alonso MA sent at 6/2/2023  4:16 PM CDT -----  Hey is she being rescheduled?   ----- Message -----  From: Katie Daniel  Sent: 6/2/2023   4:02 PM CDT  To: Tj Mayfield MD, Brigida Ramos LPN, #    Dr. Mayfield requested pt. Case as Urgent pre-service states procedure doesn't met Medical Urgency  Ref [id number], it can take  5 to 15 days for a decision, Procedure scheduled 6/5/23 with Dr. Mayfield.

## 2023-06-02 NOTE — TELEPHONE ENCOUNTER
----- Message from Josefina Al sent at 6/2/2023  4:10 PM CDT -----  Regarding: Procedure  Name of Who is Calling:  Pateint          What is the request in detail:  Patient stated she received a call from the insurance stating that it take 5 days for her procedure appointment to be approved. Please give her a call            Can the clinic reply by [facility]: No            What Number to Call Back if not in [facility]: [phone]

## 2023-06-07 ENCOUNTER — OFFICE VISIT (OUTPATIENT)
Dept: ORTHOPEDICS | Facility: CLINIC | Age: 63
End: 2023-06-07
Payer: MEDICARE

## 2023-06-07 DIAGNOSIS — M48.00 CENTRAL STENOSIS OF SPINAL CANAL: ICD-10-CM

## 2023-06-07 DIAGNOSIS — M54.16 LUMBAR RADICULOPATHY: Primary | ICD-10-CM

## 2023-06-07 DIAGNOSIS — M54.16 LUMBAR RADICULOPATHY, CHRONIC: ICD-10-CM

## 2023-06-07 DIAGNOSIS — M54.16 LUMBAR RADICULOPATHY: ICD-10-CM

## 2023-06-07 PROCEDURE — 99999 PR PBB SHADOW E&M-EST. PATIENT-LVL IV: CPT | Mod: PBBFAC,,, | Performed by: ORTHOPAEDIC SURGERY

## 2023-06-07 PROCEDURE — 99214 PR OFFICE/OUTPT VISIT, EST, LEVL IV, 30-39 MIN: ICD-10-PCS | Mod: S$GLB,,, | Performed by: ORTHOPAEDIC SURGERY

## 2023-06-07 PROCEDURE — 99214 OFFICE O/P EST MOD 30 MIN: CPT | Mod: S$GLB,,, | Performed by: ORTHOPAEDIC SURGERY

## 2023-06-07 PROCEDURE — 99999 PR PBB SHADOW E&M-EST. PATIENT-LVL IV: ICD-10-PCS | Mod: PBBFAC,,, | Performed by: ORTHOPAEDIC SURGERY

## 2023-06-08 ENCOUNTER — PATIENT MESSAGE (OUTPATIENT)
Dept: FAMILY MEDICINE | Facility: CLINIC | Age: 63
End: 2023-06-08
Payer: MEDICARE

## 2023-06-08 ENCOUNTER — PATIENT MESSAGE (OUTPATIENT)
Dept: RHEUMATOLOGY | Facility: CLINIC | Age: 63
End: 2023-06-08
Payer: MEDICARE

## 2023-06-08 ENCOUNTER — TELEPHONE (OUTPATIENT)
Dept: PREADMISSION TESTING | Facility: HOSPITAL | Age: 63
End: 2023-06-08
Payer: MEDICARE

## 2023-06-08 ENCOUNTER — PATIENT MESSAGE (OUTPATIENT)
Dept: CARDIOLOGY | Facility: CLINIC | Age: 63
End: 2023-06-08
Payer: MEDICARE

## 2023-06-08 ENCOUNTER — TELEPHONE (OUTPATIENT)
Dept: FAMILY MEDICINE | Facility: CLINIC | Age: 63
End: 2023-06-08
Payer: MEDICARE

## 2023-06-08 ENCOUNTER — SPECIALTY PHARMACY (OUTPATIENT)
Dept: PHARMACY | Facility: CLINIC | Age: 63
End: 2023-06-08
Payer: MEDICARE

## 2023-06-08 DIAGNOSIS — M06.9 RHEUMATOID ARTHRITIS, INVOLVING UNSPECIFIED SITE, UNSPECIFIED WHETHER RHEUMATOID FACTOR PRESENT: ICD-10-CM

## 2023-06-08 DIAGNOSIS — M79.609 PAIN IN EXTREMITY, UNSPECIFIED EXTREMITY: ICD-10-CM

## 2023-06-08 DIAGNOSIS — M05.79 RHEUMATOID ARTHRITIS INVOLVING MULTIPLE SITES WITH POSITIVE RHEUMATOID FACTOR: Primary | Chronic | ICD-10-CM

## 2023-06-08 DIAGNOSIS — Z01.818 PREOPERATIVE TESTING: Primary | ICD-10-CM

## 2023-06-08 RX ORDER — SARILUMAB 200 MG/1.14ML
200 INJECTION, SOLUTION SUBCUTANEOUS
Qty: 2.28 ML | Refills: 1 | Status: ACTIVE | OUTPATIENT
Start: 2023-06-08 | End: 2023-09-06 | Stop reason: SDUPTHER

## 2023-06-08 NOTE — PRE-PROCEDURE INSTRUCTIONS
Instructed patient : No Kevzara after today( 6/8), can resume 2 wks post op when sutures out and wound checked, per Dr. Isiah Moran.  Verbalizes understanding.

## 2023-06-08 NOTE — PRE-PROCEDURE INSTRUCTIONS
Patient stated has not had any problem with anesthesia in the past. Will need medical clearance from your PCP, Dr. Krystal Singleton. She will make an appt.Will need cardiac clearance from Dr. Weston Jacob.  She will make an appt. I will ask Dr. Jacob for ASA 2325 mg instructions. I will ask Dr. Moran for Kevzara instructions. Will need poc appt, labs and ua.  Our  will call to set up these appts.    Preop instructions given. Hold other aspirin containing products, nsaids(aleve, advil, motrin, ibuprofen, naprosyn, naproxen, voltaren, diclofenac), vitamins( Citracal wiath Vitamin D3) and supplements one week prior to surgery.     May take Tylenol. Await instructions for Aspirin 325 mg from Dr. Jacob. Await instructions for Kevzara from Dr. Moran.  ( Sent to [facility] portal)  Verbalizes understanding.

## 2023-06-08 NOTE — ANESTHESIA PAT ROS NOTE
06/08/2023  Joyce Peterson is a 62 y.o., female with LUMBAR RADICULOPATHY, presents  in wheelchair [facility] Visit and Preop Instructions. Patient has extensive Osteoarthritis and Rheumatoid arthritis.    Dr. Isiah Moran MD sent at 6/8/2023  9:52 AM CDT -----  ORDERS:  no Kevzara after (6/8/23) today, can resume 2 wks post op when sutures out and wound checked. Can continue leflunomide and prednisone 5 mg daily with hydrocortisone.    Conferred with LU Arias with Dr. Mosqueda regarding patient's pending Iron Infusion on Tuesday 6/20/23 and approval to continue as schedule. Patient informed.    Pre-op Assessment    I have reviewed the Patient Summary Reports.     I have reviewed the Nursing Notes. I have reviewed the NPO Status.   I have reviewed the Medications.     Review of Systems  Anesthesia Hx:    ALLERGIC TO ZOFRAN    PONV--PHENERGAN EFFECTIVE     History of prior surgery of interest to airway management or planning:  Previous anesthesia: MAC       1/11/2023 FUSION LEFT FOOT ANKLE with MAC.  Procedure performed at an [facility].   Personal Hx of Anesthesia complications, Post-Operative Nausea/Vomiting, with every anesthetic, treatment not known                    Social:  Non-Smoker, Social Alcohol Use       Hematology/Oncology:       -- Anemia:               Hematology Comments: Iron deficiency anemia due to chronic blood loss--next Iron infusion Tuesday 6/20/23.                Oncology Comments: 01/29/2002 Breast biopsy (Left)     EENT/Dental:  denies chronic allergic rhinitis           Cardiovascular:  Exercise tolerance: poor   Denies Pacemaker.  Denies Hypertension.   CAD       Denies Angina.    denies PVD hyperlipidemia     Functional Capacity 4 METS                         Pulmonary:    Asthma mild  Denies Shortness of breath.  Denies Recent  URI.  Denies Sleep Apnea. 2020 COVID+-RESOLVED               Renal/:   Denies Chronic Renal Disease. no renal calculi  Ureterocele,   Urge urinary incontinence             Hepatic/GI:   Denies PUD.  GERD, well controlled Liver Disease,  Denies Hepatitis.   GASTROPARESIS    08/13/2015  Umbilical hernia    2004 Cholecystectomy         Liver Disease, Fatty Liver        Musculoskeletal:  Arthritis       1/11/2023  Foot arthrodesis (Left)   1/11/2023  Harvesting of bone graft (Left)   1/11/2023  Lengthening of tendon of lower extremity (Left)     5/21/2019  Intramedullary rodding of femur (Left)     10/17/2018  Repair of triceps tendon (Left)   10/17/2018  Rupture of left triceps tendon    05/2017  Carpal tunnel release (Right)    6/28/2022  Degenerative joint disease of hindfoot, left  Pes planovalgus, acquired, left    01/19/2022  History of methotrexate therapy      04/06/2016  Lateral meniscus derangement    03/08/2013  CS (cervical spondylosis)  03/08/2013    Lumbar spondylosis     Musculoskeletal General/Symptoms:  Functional capacity is ambulatory with cane. Impaired gait and mobility  Joint Disease:  Arthritis, Osteoarthritis, Rheumatoid Arthritis, Sjogren's Syndrome   Bone Disorders:    Osteoporosis    Cervical Spine Disorder, Cervical Disc Disease, Radiculopathy   Lumbar Spine Disorders, Lumbar Disc Disease, Radiculopathy   OB/GYN/PEDS:  2003 Tubal ligation    2003 uterine ablation            Neurological:  Denies TIA.  Denies CVA. Neuromuscular Disease,   Denies Seizures.       Pain Syndrome   Arthritis, Osteoarthritis, Rheumatoid Arthritis, Sjogren's Syndrome, Fibromayalgia   Neuropathic pain of left foot, Impaired gait and mobility                        Endocrine:    Hyperthyroidism   Thyroid Disease, Hyperthyroidism   Toxic Nodule    Hormone replacement therapy (HRT      Dermatological:  Skin Normal    Psych:  Psychiatric Normal                  Physical Exam  General: Well nourished, Cooperative, Alert  and Oriented    Airway:  Mouth Opening: Normal  Tongue: Normal  Neck ROM: Normal ROM  DRY MOUTH  Dental:  Intact    Chest/Lungs:  Clear to auscultation, Normal Respiratory Rate    Heart:  Rate: Normal  Rhythm: Regular Rhythm  Sounds: Normal        Anesthesia Assessment: Preoperative EQUATION    Planned Procedure: Procedure(s) (LRB):  FUSION, SPINE, LUMBAR, TLIF, POSTERIOR APPROACH, USING PEDICLE SCREW L4/5 spinewave SNS:SSEP/EMG (N/A)  Requested Anesthesia Type:General  Surgeon: Jh Mosqueda MD  Service: Neurosurgery  Known or anticipated Date of Surgery:6/22/2023    Surgeon notes: reviewed    Electronic QUestionnaire Assessment completed via nurse interview with patient.        Triage considerations:       Previous anesthesia records:MAC and No problems/ [facility]  1/11/2023   FUSION, FOOT (Left: Ankle)   SURGICAL PROCUREMENT, BONE GRAFT (Left: Foot)   LENGTHENING, TENDON, LOWER EXTREMITY (Left: Leg)   Airway:  Mallampati: II / I  Mouth Opening: Normal  TM Distance: Normal  Tongue: Normal  Neck ROM: Normal ROM, Extension Painful    Last PCP note: within 3 months , within [facility]   Subspecialty notes: Cardiology: General, Endocrinology, ENT, Gastroenterology, Ortho, Pain Management, Rheumatology, OPTOMETRY, PODIATRY, SPORTS MEDICINE, OB/GYN    Other important co-morbidities: PER Epic: GERD, HLD, and LUMBAR RADICULOPATHY, CAD, RHEUMATOID ARTHRITIS, ANEMIA, FATTY LIVER, OSTEOPOROSIS,FIBROMYALGIA, OSTEOARTHRITIS       Tests already available:  Available tests,  within 1 month , within 3 months , within [facility] .   85/29/2023 MRI LUMBAR SPINE W/O CONTRAST, 5/24/2023 CBC, 5/9/2023 CMP, 5/1/2023 XRAY LUMBAR SPINE AP/LAT W F/E         Instructions given. (See in Nurse's note)    Optimization:  Anesthesia Preop Clinic Assessment  Indicated    Medical Opinion Indicated       Sub-specialist consult indicated:  CARDIOLOLGY       Plan:    Testing:  PT/INR, PTT, T&S, and UA   Pre-anesthesia  visit       Visit focus: concerns  in complex and/or prolonged anesthesia, COMORBIDITIES     Consultation:Patient's PCP for re-evaluation     Patient  has previously scheduled Medical Appointment:6/15 [facility]    Navigation: Tests Scheduled. TBD             Consults scheduled.TBD             Results will be tracked by Preop Clinic.  6/8 MD Patricia Garcia, RN  Caller: Unspecified (Today,  9:36 AM)  jimmy Gomez after today, can resume 2 wks post op when sutures out and wound checked. Can continue leflunomide and prednisone 5 mg daily with hydrocortisone on call if needed during surgery. She had flex-ext neck in Dec 2022 which shows no AAS. RJQ   Patricia Muniz RN BSN    6/13/2023 MEDICAL CLEARANCE  Impression: Patient is cleared from PCP for surgery. Will see cardiology Monday for clearance.  Justin Wesley NP   [facility]  6/13/23          Electronically signed by Justin Wesley NP at 6/13/2023  4:35 PM       6/19/23 CARDIOLOGY CLEARANCE  Plan       Diagnoses and all orders for this visit:     Coronary artery disease involving native coronary artery of native heart without angina pectoris     Hormone replacement therapy (HRT)     Sjogren's syndrome, with unspecified organ involvement     Rheumatoid arthritis involving multiple sites with positive rheumatoid factor     Pure hypercholesterolemia     Agatston coronary artery calcium score greater than 400     Family history of early CAD     Iron deficiency anemia secondary to blood loss (chronic) NSAIDs     Based on all available imformation she is at low CV risk for her planned lumbar spinal fusion                Electronically signed by Weston Jacob MD at 6/19/2023  2:22 PM

## 2023-06-08 NOTE — TELEPHONE ENCOUNTER
----- Message from Patricia Muniz RN sent at 6/8/2023  9:33 AM CDT -----  Patient is scheduled for TLIF L4-5 on 6/22 with Dr. Mosqueda. ( Approximately 215 minutes of general anesthesia) She will need medical clearance. Please schedule a preop clearance appt.  Thanks!l

## 2023-06-08 NOTE — TELEPHONE ENCOUNTER
----- Message from Isiah Moran MD sent at 6/8/2023  9:52 AM CDT -----  jimmy Gomez after today, can resume 2 wks post op when sutures out and wound checked. Can continue leflunomide and prednisone 5 mg daily with hydrocortisone on call if needed during surgery. She had flex-ext neck in Dec 2022 which shows no AAS. RJQ  ----- Message -----  From: Patricia Muniz RN  Sent: 6/8/2023   9:37 AM CDT  To: Isiah Moran MD, Patricia Muniz, RN, #    Patient is scheduled for TLIF L4-5 on 6/22 with Dr. Mosqueda. ( Approximately 215 minutes of general anesthesia)   I will need Dom instructions.Next dose due 6/19.  Thanks!l

## 2023-06-08 NOTE — TELEPHONE ENCOUNTER
Outgoing call to pt for Kevzara refill. Pt reports she is having back surgery on 6/22. She will have stitches or staples. Pt last injected Kevzara on 6/5, would be due 6/19 but will hold until after surgery and is cleared to resume. Pt has already messaged Dr Moran for guidance. Will continue to monitor the chart to see what instructions Dr Moran gives. Pt agreed to f/u.

## 2023-06-08 NOTE — TELEPHONE ENCOUNTER
----- Message from Krystal Singleton DO sent at 6/8/2023 12:13 PM CDT -----  Regarding: med clearance  Pt needs appt for clearance; ok with [facility]  ----- Message -----  From: Patricia Muniz RN  Sent: 6/8/2023   9:34 AM CDT  To: Patricia Muniz RN, Krystal Singleton DO, #    Patient is scheduled for TLIF L4-5 on 6/22 with Dr. Mosqueda. ( Approximately 215 minutes of general anesthesia) She will need medical clearance. Please schedule a preop clearance appt.  Thanks!l

## 2023-06-09 ENCOUNTER — PATIENT MESSAGE (OUTPATIENT)
Dept: UROLOGY | Facility: CLINIC | Age: 63
End: 2023-06-09
Payer: MEDICARE

## 2023-06-12 ENCOUNTER — PATIENT MESSAGE (OUTPATIENT)
Dept: SURGERY | Facility: HOSPITAL | Age: 63
End: 2023-06-12
Payer: MEDICARE

## 2023-06-12 DIAGNOSIS — H04.123 BILATERAL DRY EYES: ICD-10-CM

## 2023-06-12 DIAGNOSIS — J30.89 PERENNIAL ALLERGIC RHINITIS: ICD-10-CM

## 2023-06-12 DIAGNOSIS — M06.9 RHEUMATOID ARTHRITIS: ICD-10-CM

## 2023-06-12 RX ORDER — OMEPRAZOLE AND SODIUM BICARBONATE 40; 1100 MG/1; MG/1
CAPSULE ORAL
Qty: 90 CAPSULE | Refills: 3 | Status: SHIPPED | OUTPATIENT
Start: 2023-06-12 | End: 2024-01-31

## 2023-06-12 RX ORDER — LEFLUNOMIDE 20 MG/1
TABLET ORAL
Qty: 90 TABLET | Refills: 0 | Status: SHIPPED | OUTPATIENT
Start: 2023-06-12 | End: 2023-09-06 | Stop reason: SDUPTHER

## 2023-06-13 ENCOUNTER — OFFICE VISIT (OUTPATIENT)
Dept: FAMILY MEDICINE | Facility: CLINIC | Age: 63
End: 2023-06-13
Payer: MEDICARE

## 2023-06-13 VITALS
OXYGEN SATURATION: 95 % | WEIGHT: 144.19 LBS | HEIGHT: 61 IN | SYSTOLIC BLOOD PRESSURE: 122 MMHG | TEMPERATURE: 98 F | HEART RATE: 112 BPM | BODY MASS INDEX: 27.22 KG/M2 | DIASTOLIC BLOOD PRESSURE: 64 MMHG

## 2023-06-13 DIAGNOSIS — Z01.818 PREOP EXAMINATION: Primary | ICD-10-CM

## 2023-06-13 PROCEDURE — 1160F RVW MEDS BY RX/DR IN RCRD: CPT | Mod: CPTII,S$GLB,, | Performed by: PEDIATRICS

## 2023-06-13 PROCEDURE — 3074F PR MOST RECENT SYSTOLIC BLOOD PRESSURE < 130 MM HG: ICD-10-PCS | Mod: CPTII,S$GLB,, | Performed by: PEDIATRICS

## 2023-06-13 PROCEDURE — 99999 PR PBB SHADOW E&M-EST. PATIENT-LVL V: ICD-10-PCS | Mod: PBBFAC,,, | Performed by: PEDIATRICS

## 2023-06-13 PROCEDURE — 93010 ELECTROCARDIOGRAM REPORT: CPT | Mod: S$GLB,,, | Performed by: INTERNAL MEDICINE

## 2023-06-13 PROCEDURE — 3008F PR BODY MASS INDEX (BMI) DOCUMENTED: ICD-10-PCS | Mod: CPTII,S$GLB,, | Performed by: PEDIATRICS

## 2023-06-13 PROCEDURE — 93005 ELECTROCARDIOGRAM TRACING: CPT | Mod: S$GLB,,, | Performed by: PEDIATRICS

## 2023-06-13 PROCEDURE — 1159F PR MEDICATION LIST DOCUMENTED IN MEDICAL RECORD: ICD-10-PCS | Mod: CPTII,S$GLB,, | Performed by: PEDIATRICS

## 2023-06-13 PROCEDURE — 1159F MED LIST DOCD IN RCRD: CPT | Mod: CPTII,S$GLB,, | Performed by: PEDIATRICS

## 2023-06-13 PROCEDURE — 3078F DIAST BP <80 MM HG: CPT | Mod: CPTII,S$GLB,, | Performed by: PEDIATRICS

## 2023-06-13 PROCEDURE — 3008F BODY MASS INDEX DOCD: CPT | Mod: CPTII,S$GLB,, | Performed by: PEDIATRICS

## 2023-06-13 PROCEDURE — 99214 OFFICE O/P EST MOD 30 MIN: CPT | Mod: S$GLB,,, | Performed by: PEDIATRICS

## 2023-06-13 PROCEDURE — 99214 PR OFFICE/OUTPT VISIT, EST, LEVL IV, 30-39 MIN: ICD-10-PCS | Mod: S$GLB,,, | Performed by: PEDIATRICS

## 2023-06-13 PROCEDURE — 93005 EKG 12-LEAD: ICD-10-PCS | Mod: S$GLB,,, | Performed by: PEDIATRICS

## 2023-06-13 PROCEDURE — 3074F SYST BP LT 130 MM HG: CPT | Mod: CPTII,S$GLB,, | Performed by: PEDIATRICS

## 2023-06-13 PROCEDURE — 3078F PR MOST RECENT DIASTOLIC BLOOD PRESSURE < 80 MM HG: ICD-10-PCS | Mod: CPTII,S$GLB,, | Performed by: PEDIATRICS

## 2023-06-13 PROCEDURE — 93010 EKG 12-LEAD: ICD-10-PCS | Mod: S$GLB,,, | Performed by: INTERNAL MEDICINE

## 2023-06-13 PROCEDURE — 99999 PR PBB SHADOW E&M-EST. PATIENT-LVL V: CPT | Mod: PBBFAC,,, | Performed by: PEDIATRICS

## 2023-06-13 PROCEDURE — 1160F PR REVIEW ALL MEDS BY PRESCRIBER/CLIN PHARMACIST DOCUMENTED: ICD-10-PCS | Mod: CPTII,S$GLB,, | Performed by: PEDIATRICS

## 2023-06-13 RX ORDER — METHYLPREDNISOLONE SOD SUCC 125 MG
125 VIAL (EA) INJECTION ONCE AS NEEDED
Status: CANCELLED | OUTPATIENT
Start: 2023-06-13

## 2023-06-13 RX ORDER — MONTELUKAST SODIUM 10 MG/1
TABLET ORAL
Qty: 90 TABLET | Refills: 3 | Status: SHIPPED | OUTPATIENT
Start: 2023-06-13 | End: 2023-07-26 | Stop reason: SDUPTHER

## 2023-06-13 RX ORDER — SODIUM CHLORIDE 0.9 % (FLUSH) 0.9 %
10 SYRINGE (ML) INJECTION
Status: CANCELLED | OUTPATIENT
Start: 2023-06-13

## 2023-06-13 RX ORDER — HEPARIN 100 UNIT/ML
500 SYRINGE INTRAVENOUS
Status: CANCELLED | OUTPATIENT
Start: 2023-06-13

## 2023-06-13 RX ORDER — DIPHENHYDRAMINE HYDROCHLORIDE 50 MG/ML
50 INJECTION INTRAMUSCULAR; INTRAVENOUS ONCE AS NEEDED
Status: CANCELLED | OUTPATIENT
Start: 2023-06-13

## 2023-06-13 RX ORDER — EPINEPHRINE 0.3 MG/.3ML
0.3 INJECTION SUBCUTANEOUS ONCE AS NEEDED
Status: CANCELLED | OUTPATIENT
Start: 2023-06-13

## 2023-06-13 RX ORDER — CYCLOSPORINE 0.5 MG/ML
EMULSION OPHTHALMIC
Qty: 180 EACH | Refills: 3 | Status: SHIPPED | OUTPATIENT
Start: 2023-06-13

## 2023-06-13 NOTE — PROGRESS NOTES
DATE: 6/7/23  PATIENT: Joyce Peterson      CHIEF COMPLAINT: low back and bilateral leg pain    HISTORY:  Joyce Peterson is a 62 y.o. female with a pmhx of sjogrens and RA with chronic steroids (on prolia, hx of multiple fx), steroid induced osteoporosis,  here for evaluation of low back and bilateral leg pain (Back - 7, Leg - 7).  The pain in the lower back and behind both legs is what bothers her most.  The pain has been present for years, worsening over time. The patient describes the pain as aching and radiating down the back of both legs.  The pain is worse with walking and laying and improved by leaning on the shopping cart. There is positive associated numbness and tingling. There is negative subjective weakness. Prior treatments have included healthy back program, medications, and multiple pain management procedures, but no surgery. The pt has been largely using her wheelchair when she leaves the house due to pain. She takes dilaudid PO bid, robaxin and lyrica.     The patient denies myelopathic symptoms such as handwriting changes or difficulty with buttons/coins/keys. Denies perineal paresthesias, bowel/bladder dysfunction.    Pain Procedures:   3/12/20 Bilateral L4/5 and L5/S1 facet injection- significant benefit of back pain  7/30/20 Right hip injection- no relief   8/31/20 Right L3/4 and L4/5 TF MADELINE- 60% relief of leg pain  3/29/21 Bilateral L3/4, L4/5 and L5/S1 facet injections  7/21/21 Bilateral L4/5 TF MADELINE- limited benefit  8/23/21 Bilateral L3,4,5 MBB- significant short term benefit  8/26/21 Bilateral L3,4,5 MBB- significant short term benefit  9/20/21 Left L3,4,5 RFA- 80% relief  10/4/21 Right L3,4,5 RFA- 80% relief  4/12/22 Left L3,4,5 RFA- 80% relief  5/12/22 Right L3,4,5 RFA- 80% relief  7/25/22 Left Femoral and Left obturator Coolief RFA  9/29/22 L3-4 MADELINE  12/12/22 L5-S1 MADELINE  4/6/23 L5-S1 MADELINE    PAST MEDICAL/SURGICAL HISTORY:  Past Medical History:   Diagnosis Date    Acid reflux      Allergy     Anemia     Asthma     Coronary artery disease     CS (cervical spondylosis) 03/08/2013    Degenerative disc disease     Degenerative joint disease of hindfoot, left 6/28/2022    Dry eyes     Dry mouth     Gastroparesis     History of methotrexate therapy 01/19/2022    Hyperlipidemia     Lateral meniscus derangement 04/06/2016    Lobular carcinoma in situ     Lumbar spondylosis 03/08/2013    Osteoarthritis     Osteoporosis     Pes planovalgus, acquired, left 6/28/2022    Rheumatoid arthritis(714.0)     Rupture of left triceps tendon 10/17/2018    Umbilical hernia 08/13/2015     Past Surgical History:   Procedure Laterality Date    BREAST BIOPSY Left 01/29/2002    core bx    CARPAL TUNNEL RELEASE Right 05/2017    CHOLECYSTECTOMY  2004    COLONOSCOPY      10/11    COLONOSCOPY N/A 6/29/2017    Procedure: COLONOSCOPY;  Surgeon: López Moore MD;  Location: [facility] ENDO ([address] [place] [address]);  Service: Endoscopy;  Laterality: N/A;    EPIDURAL STEROID INJECTION N/A 11/18/2021    Procedure: INJECTION, STEROID, EPIDURAL, L5-S1 IL NEED CONSENT;  Surgeon: Tj Mayfield MD;  Location: [facility] PAIN MGT;  Service: Pain Management;  Laterality: N/A;    EPIDURAL STEROID INJECTION N/A 9/29/2022    Procedure: LUMBAR L3/L4 IL MADELINE CONTRAST;  Surgeon: Tj Mayfield MD;  Location: [facility] PAIN MGT;  Service: Pain Management;  Laterality: N/A;    EPIDURAL STEROID INJECTION N/A 12/12/2022    Procedure: INJECTION, STEROID, EPIDURAL L5/S1 IL CONTRAST;  Surgeon: Tj Mayfield MD;  Location: [facility] PAIN MGT;  Service: Pain Management;  Laterality: N/A;    EPIDURAL STEROID INJECTION N/A 4/6/2023    Procedure: INJECTION, STEROID, EPIDURAL L5/S1;  Surgeon: Tj Mayfield MD;  Location: [facility] PAIN MGT;  Service: Pain Management;  Laterality: N/A;    FOOT ARTHRODESIS Left 1/11/2023    Procedure: FUSION, FOOT;  Surgeon: Ariella Finnegan MD;  Location: [facility] OR;  Service: Orthopedics;  Laterality: Left;  nimbus    HARVESTING OF BONE GRAFT Left 1/11/2023    Procedure:  SURGICAL PROCUREMENT, BONE GRAFT;  Surgeon: Ariella Finnegan MD;  Location: [facility] OR;  Service: Orthopedics;  Laterality: Left;    INJECTION OF ANESTHETIC AGENT AROUND NERVE Bilateral 8/23/2021    Procedure: BLOCK, NERVE, MEDIAL BRANCH L3,L4,L5;  Surgeon: Tj Mayfield MD;  Location: [facility] PAIN MGT;  Service: Pain Management;  Laterality: Bilateral;  1 of 2    INJECTION OF ANESTHETIC AGENT AROUND NERVE Bilateral 8/26/2021    Procedure: BLOCK, NERVE, MEDIAL BRANCH L3,L4,L5;  Surgeon: Tj Mayfield MD;  Location: [facility] PAIN [facility]GT;  Service: Pain Management;  Laterality: Bilateral;  2 of 2    INJECTION OF ANESTHETIC AGENT AROUND NERVE Left 7/7/2022    Procedure: BLOCK, NERVE, LEFT OBTURATOR AND FEMORAL;  Surgeon: Tj Mayfield MD;  Location: [facility] PAIN [facility]GT;  Service: Pain Management;  Laterality: Left;    INJECTION OF FACET JOINT Bilateral 3/12/2020    Procedure: FACET JOINT INJECTION (LUMBAR BLOCK) BILATERAL L4-5 AND L5-S1 DIRECT REFERRAL;  Surgeon: Tj Mayfield MD;  Location: [facility]IN [facility]GT;  Service: Pain Management;  Laterality: Bilateral;  NEEDS CONSENT    INJECTION OF FACET JOINT Bilateral 3/29/2021    Procedure: INJECTION, FACET JOINT L3/4, L4/5, L5/S1;  Surgeon: Tj Mayfield MD;  Location: [facility]IN [facility]GT;  Service: Pain Management;  Laterality: Bilateral;    INJECTION OF JOINT Right 7/30/2020    Procedure: INJECTION, JOINT, RIGHT HIP Interarticular under flouro;  Surgeon: Tj Mayfield MD;  Location: [facility] PAIN MGT;  Service: Pain Management;  Laterality: Right;  INJECTION, JOINT, RIGHT HIP Interarticular under flouro    INJECTION OF JOINT Right 2/21/2022    Procedure: Injection, Joint RIGHT ISCHIAL BURSA;  Surgeon: Tj Mayfield MD;  Location: [facility] PAIN MGT;  Service: Pain Management;  Laterality: Right;    INTRAMEDULLARY RODDING OF FEMUR Left 5/21/2019    Procedure: INSERTION, INTRAMEDULLARY ARIEL, FEMUR;  Surgeon: López Duff MD;  Location: [facility] OR 2ND FLR;  Service: Orthopedics;  Laterality: Left;    LENGTHENING OF  TENDON OF LOWER EXTREMITY Left 1/11/2023    Procedure: LENGTHENING, TENDON, LOWER EXTREMITY;  Surgeon: Ariella Finnegan MD;  Location: [facility] OR;  Service: Orthopedics;  Laterality: Left;    MAGNETIC RESONANCE IMAGING N/A 5/29/2023    Procedure: MRI (Magnetic Resonance Imagine);  Surgeon: Sujey Surgeon;  Location: [facility];  Service: Anesthesiology;  Laterality: N/A;    RADIOFREQUENCY ABLATION Left 9/20/2021    Procedure: RADIOFREQUENCY ABLATION left L3,4,5 RFA  1 of 2  CONSENT NEEDED;  Surgeon: Tj Mayfield MD;  Location: [facility] PAIN MGT;  Service: Pain Management;  Laterality: Left;    RADIOFREQUENCY ABLATION Right 10/4/2021    Procedure: RADIOFREQUENCY ABLATION  right L3,4,5 RFA   2 of 2  CONSENT NEEDED;  Surgeon: Tj Mayfield MD;  Location: [facility] PAIN MGT;  Service: Pain Management;  Laterality: Right;    RADIOFREQUENCY ABLATION Left 4/21/2022    Procedure: Radiofrequency Ablation LEFT L3,L4,L5;  Surgeon: Tj Mayfield MD;  Location: [facility] PAIN MGT;  Service: Pain Management;  Laterality: Left;    RADIOFREQUENCY ABLATION Right 5/12/2022    Procedure: Radiofrequency Ablation RIGHT L3,L4,L5;  Surgeon: Tj Mayfield MD;  Location: [facility] PAIN MGT;  Service: Pain Management;  Laterality: Right;    RADIOFREQUENCY ABLATION Left 7/25/2022    Procedure: Radiofrequency Ablation Left Femoral & Obturator;  Surgeon: Tj Mayfield MD;  Location: [facility] PAIN [facility]GT;  Service: Pain Management;  Laterality: Left;    REPAIR OF TRICEPS TENDON Left 10/17/2018    Procedure: REPAIR, TENDON, TRICEPS left elbow;  Surgeon: Staci Yarbrough MD;  [address]  Service: Orthopedics;  Laterality: Left;  Anesthesia: General and regional. PRONE, k-wire , hand pan 1 and pan 2, CALL ARTHREX/Tamera notified 10-12 LO    TONSILLECTOMY      TRANSFORAMINAL EPIDURAL INJECTION OF STEROID Right 8/31/2020    Procedure: INJECTION, STEROID, EPIDURAL, TRANSFORAMINAL,  APPROACH, L3-L4 and L4-L5 need consent;  Surgeon: Tj Mayfield MD;  Location:  [facility] PAIN MGT;  Service: Pain Management;  Laterality: Right;    TRANSFORAMINAL EPIDURAL INJECTION OF STEROID Bilateral 7/23/2021    Procedure: INJECTION, STEROID, EPIDURAL, TRANSFORAMINAL APPROACH L4/5;  Surgeon: Tj Mayfield MD;  Location: [facility] PAIN MGT;  Service: Pain Management;  Laterality: Bilateral;    TRIGGER POINT INJECTION N/A 7/23/2021    Procedure: INJECTION, TRIGGER POINT SCAPULAR;  Surgeon: Tj Mayfield MD;  Location: [facility] PAIN MGT;  Service: Pain Management;  Laterality: N/A;    TUBAL LIGATION  2003    UPPER GASTROINTESTINAL ENDOSCOPY      10/11    uterine ablation  2003       Medications:   Current Outpatient Medications on File Prior to Visit   Medication Sig Dispense Refill    albuterol (PROVENTIL/VENTOLIN HFA) 90 mcg/actuation inhaler Inhale 2 puffs into the lungs every 6 (six) hours as needed. Rescue 20.1 g 11    albuterol-ipratropium (DUO-NEB) 2.5 mg-0.5 mg/3 mL nebulizer solution Take 3 mLs by nebulization every 6 (six) hours as needed for Wheezing. Rescue 90 mL 3    aspirin 325 MG tablet Take 1 tablet (325 mg total) by mouth once daily. (Patient not taking: Reported on 5/26/2023) 30 tablet 0    budesonide-formoterol 160-4.5 mcg (SYMBICORT) 160-4.5 mcg/actuation HFAA Inhale 2 puffs into the lungs every 12 (twelve) hours. 30.6 g 3    calcium citrate-vitamin D3 315-200 mg (CITRACAL+D) 315 mg-5 mcg (200 unit) per tablet Take 1 tablet by mouth 2 (two) times daily.       cycloSPORINE (RESTASIS) 0.05 % ophthalmic emulsion Instill one drop into both eyes two times per day 180 each 3    diclofenac sodium (VOLTAREN) 1 % Gel APPLY 2 GRAMS TOPICALLY 4 (FOUR) TIMES DAILY AS NEEDED. 300 g 2    fluconazole (DIFLUCAN) 150 MG Tab Take 150 mg by mouth as needed.      furosemide (LASIX) 20 MG tablet Take 1 tablet (20 mg total) by mouth once daily. 5 tablet 0    halobetasol propion-tazarotene (DUOBRII) 0.01-0.045 % Lotn aaa on feet and hands qhs  then vaseline and warm towel (Patient taking differently: aaa on  feet and hands qhs  then vaseline and warm towel) 100 g 3    HYDROmorphone (DILAUDID) 4 MG tablet Take 0.5 tablets (2 mg total) by mouth every 8 (eight) hours as needed for Pain. 45 tablet 0    INV PROPULSID 10 MG Take 2 tablets (20 mg) by mouth 4 (four) times daily. FOR INVESTIGATIONAL USE ONLY. Protocol [id number]  Subject ID: [id number]. 1440 each 0    ketoconazole (NIZORAL) 2 % cream Apply to feet twice daily for 3 weeks (Patient taking differently: Apply to feet twice daily for 3 weeks) 60 g 3    lifitegrast (XIIDRA) 5 % Dpet INSTILL ONE DROP INTO BOTH EYES TWICE A DAY 60 mL 10    magic mouthwash diphen/antac/lidoc Swish and Spit 5 mL by mouth for 30 seconds twice daily. 150 mL 0    methenamine (HIPREX) 1 gram Tab Take 1 tablet (1 g total) by mouth 2 (two) times daily. 180 tablet 3    methocarbamoL (ROBAXIN) 750 MG Tab Take 1 tablet (750 mg total) by mouth 3 (three) times daily. 90 tablet 0    methocarbamoL (ROBAXIN) 750 MG Tab Take 1 tablet (750 mg total) by mouth 4 (four) times daily. 120 tablet 0    mirabegron (MYRBETRIQ) 25 mg Tb24 ER tablet Take 1 tablet (25 mg total) by mouth once daily. 90 tablet 0    montelukast (SINGULAIR) 10 mg tablet Take 1 tablet (10 mg total) by mouth every evening. 90 tablet 3    naproxen (NAPROSYN) 500 MG tablet Take 1 tablet (500 mg total) by mouth 2 (two) times daily as needed (prn). 180 tablet 1    omeprazole (PRILOSEC) 40 MG capsule Take 1 capsule (40 mg total) by mouth every morning. 30 capsule 6    potassium chloride SA (K-DUR,KLOR-CON) 20 MEQ tablet Take 1 tablet (20 mEq total) by mouth once daily. Take with lasix 5 tablet 0    POTASSIUM ORAL Take by mouth once daily.      predniSONE (DELTASONE) 1 MG tablet TAKE 5 TABLETS (5 MG TOTAL) BY MOUTH ONCE DAILY. (Patient taking differently: Take 5 mg by mouth once daily. Pt takes a total of 6 mg every morning.) 450 tablet 1    predniSONE (DELTASONE) 5 MG tablet Take 1 tablet (5 mg total) by mouth once daily. 90 tablet 1     pregabalin (LYRICA) 100 MG capsule Take 1 capsule (100 mg total) by mouth 3 (three) times daily. 90 capsule 2    PREMARIN vaginal cream PLACE 0.5 GRAM VAGINALLY 3 (THREE) TIMES A WEEK. 30 g 1    promethazine (PHENERGAN) 25 MG tablet Take 1 tablet (25 mg total) by mouth every 6 (six) hours as needed for Nausea. 15 tablet 0    rosuvastatin (CRESTOR) 20 MG tablet Take 1 tablet (20 mg total) by mouth every evening. 90 tablet 3    sucralfate (CARAFATE) 1 gram tablet TAKE 1 TABLET (1 G TOTAL) BY MOUTH 4 (FOUR) TIMES DAILY. 360 tablet 2    sucralfate (CARAFATE) 1 gram tablet Take 1 tablet (1 g total) by mouth 4 (four) times daily. 360 tablet 3     No current facility-administered medications on file prior to visit.       Social History:   Social History     Socioeconomic History    Marital status:    Tobacco Use    Smoking status: Never    Smokeless tobacco: Never   Substance and Sexual Activity    Alcohol use: Yes     Comment: 2-3 times per year.    Drug use: No    Sexual activity: Yes     Partners: Male     Birth control/protection: Surgical     Social Determinants of Health     Financial Resource Strain: Low Risk     Difficulty of Paying Living Expenses: Not hard at all   Food Insecurity: Unknown    Worried About Running Out of Food in the Last Year: Patient refused    Ran Out of Food in the Last Year: Patient refused   Transportation Needs: Unknown    Lack of Transportation (Medical): Patient refused    Lack of Transportation (Non-Medical): Patient refused   Physical Activity: Unknown    Days of Exercise per Week: 2 days   Stress: No Stress Concern Present    Feeling of Stress : Not at all   Social Connections: Unknown    Frequency of Communication with Friends and Family: Twice a week    Frequency of Social Gatherings with Friends and Family: Twice a week    Active Member of Clubs or Organizations: Yes    Attends Club or Organization Meetings: Patient refused    Marital Status:    Housing Stability:  Unknown    Unable to Pay for Housing in the Last Year: No    Unstable Housing in the Last Year: No       REVIEW OF SYSTEMS:  Constitution: Negative. Negative for chills, fever and night sweats.   Cardiovascular: Negative for chest pain and syncope.   Respiratory: Negative for cough and shortness of breath.   Gastrointestinal: See HPI. Negative for nausea/vomiting. Negative for abdominal pain.  Genitourinary: See HPI. Negative for discoloration or dysuria.  Skin: Negative for dry skin, itching and rash.   Hematologic/Lymphatic: Negative for bleeding problem. Does not bruise/bleed easily.   Musculoskeletal: Negative for falls and muscle weakness.   Neurological: See HPI. No seizures.   Endocrine: Negative for polydipsia, polyphagia and polyuria.   Allergic/Immunologic: Negative for hives and persistent infections.     EXAM:  LMP  (LMP Unknown)     General: The patient is a very pleasant 62 y.o. female in no apparent distress, the patient is oriented to person, place and time.  Psych: Normal mood and affect  HEENT: Vision grossly intact, hearing intact to the spoken word.  Lungs: Respirations unlabored.  Gait: Antalgic station and gait, no difficulty with toe or heel walk.   Skin: Dorsal lumbar skin negative for rashes, lesions, hairy patches and surgical scars. There is mild lumbar tenderness to palpation.  Range of motion: Lumbar range of motion is acceptable.  Spinal Balance: Global saggital and coronal spinal balance acceptable, not significant for scoliosis and kyphosis.  Musculoskeletal: No pain with the range of motion of the bilateral hips. No trochanteric tenderness to palpation.  Vascular: Bilateral lower extremities warm and well perfused, dorsalis pedis pulses 2+ bilaterally.  Neurological: Normal strength and tone in all major motor groups in the bilateral lower extremities. Normal sensation to light touch in the L2-S1 dermatomes bilaterally.  Deep tendon reflexes symmetric 2+ in the bilateral lower  extremities.  Negative Babinski bilaterally. Straight leg raise negative bilaterally.    IMAGING:      Today I personally reviewed AP, Lat and Flex/Ex  upright L-spine films that demonstrate severe degenerative changes at L4-5 L5-S1 with grade I anterolisthesis of L4 on L5.     MRI lumbar (2020) demonstrates multilevel degenerative changes of the lumbar spine, most significant at the level of L4-L5 where there is anterolisthesis and a disc extrusion contributing to severe spinal canal stenosis and moderate left and mild right neural foraminal narrowing.     There is no height or weight on file to calculate BMI.    Hemoglobin A1C   Date Value Ref Range Status   03/15/2022 5.2 4.0 - 5.6 % Final     Comment:     ADA Screening Guidelines:  5.7-6.4%  Consistent with prediabetes  >or=6.5%  Consistent with diabetes    High levels of fetal hemoglobin interfere with the HbA1C  assay. Heterozygous hemoglobin variants (HbS, HgC, etc)do  not significantly interfere with this assay.   However, presence of multiple variants may affect accuracy.     02/06/2017 6.0 4.5 - 6.2 % Final     Comment:     According to ADA guidelines, hemoglobin A1C <7.0% represents  optimal control in non-pregnant diabetic patients.  Different  metrics may apply to specific populations.   Standards of Medical Care in Diabetes - 2016.  For the purpose of screening for the presence of diabetes:  <5.7%     Consistent with the absence of diabetes  5.7-6.4%  Consistent with increasing risk for diabetes   (prediabetes)  >or=6.5%  Consistent with diabetes  Currently no consensus exists for use of hemoglobin A1C  for diagnosis of diabetes for children.     09/28/2015 5.6 4.5 - 6.2 % Final           ASSESSMENT/PLAN:    Joyce was seen today for pain.    Diagnoses and all orders for this visit:    Lumbar radiculopathy  -     Ambulatory referral/consult to Orthopedics    Lumbar radiculopathy, chronic  -     Ambulatory referral/consult to Orthopedics    Central  stenosis of spinal canal  -     Ambulatory referral/consult to Orthopedics        Today we discussed at length all of the different treatment options including anti-inflammatories, acetaminophen, rest, ice, heat, physical therapy including strengthening and stretching exercises, home exercises, ROM, aerobic conditioning, aqua therapy, other modalities including ultrasound, massage, and dry needling, epidural steroid injections and finally surgical intervention.      Pt has failed conservative management and is having significant negative effect on QoL and difficulty ambulating due to pain. Recommend L4/L5 TLIF. Pt understands R,B,C and would like to proceed.

## 2023-06-13 NOTE — PROGRESS NOTES
Joyce Peterson presents for preoperative evaluation.  She is scheduled for upcoming spinal fusion L4,5.  She has no new complaints since her last clinic evaluation with me other than severe back pain.    Past Medical History:   Diagnosis Date    Acid reflux     Allergy     Anemia     Asthma     Coronary artery disease     CS (cervical spondylosis) 03/08/2013    Degenerative disc disease     Degenerative joint disease of hindfoot, left 6/28/2022    Dry eyes     Dry mouth     Gastroparesis     History of methotrexate therapy 01/19/2022    Hyperlipidemia     Lateral meniscus derangement 04/06/2016    Lobular carcinoma in situ     Lumbar spondylosis 03/08/2013    Osteoarthritis     Osteoporosis     Pes planovalgus, acquired, left 6/28/2022    Rheumatoid arthritis(714.0)     Rupture of left triceps tendon 10/17/2018    Umbilical hernia 08/13/2015       Past Surgical History:   Procedure Laterality Date    BREAST BIOPSY Left 01/29/2002    core bx    CARPAL TUNNEL RELEASE Right 05/2017    CHOLECYSTECTOMY  2004    COLONOSCOPY      10/11    COLONOSCOPY N/A 6/29/2017    Procedure: COLONOSCOPY;  Surgeon: López Moore MD;  Location: [facility] ENDO ([address] [place] [address]);  Service: Endoscopy;  Laterality: N/A;    EPIDURAL STEROID INJECTION N/A 11/18/2021    Procedure: INJECTION, STEROID, EPIDURAL, L5-S1 IL NEED CONSENT;  Surgeon: Tj Mayfield MD;  Location: [facility] PAIN MGT;  Service: Pain Management;  Laterality: N/A;    EPIDURAL STEROID INJECTION N/A 9/29/2022    Procedure: LUMBAR L3/L4 IL MADELINE CONTRAST;  Surgeon: Tj Mayfield MD;  Location: [facility] PAIN MGT;  Service: Pain Management;  Laterality: N/A;    EPIDURAL STEROID INJECTION N/A 12/12/2022    Procedure: INJECTION, STEROID, EPIDURAL L5/S1 IL CONTRAST;  Surgeon: Tj Mayfield MD;  Location: [facility] PAIN MGT;  Service: Pain Management;  Laterality: N/A;    EPIDURAL STEROID INJECTION N/A 4/6/2023    Procedure: INJECTION, STEROID, EPIDURAL L5/S1;  Surgeon: Tj Mayfield MD;  Location: [facility] PAIN  MGT;  Service: Pain Management;  Laterality: N/A;    FOOT ARTHRODESIS Left 1/11/2023    Procedure: FUSION, FOOT;  Surgeon: Ariella Finnegan MD;  Location: [facility] OR;  Service: Orthopedics;  Laterality: Left;  nimbus    HARVESTING OF BONE GRAFT Left 1/11/2023    Procedure: SURGICAL PROCUREMENT, BONE GRAFT;  Surgeon: Ariella Finnegan MD;  Location: [facility] OR;  Service: Orthopedics;  Laterality: Left;    INJECTION OF ANESTHETIC AGENT AROUND NERVE Bilateral 8/23/2021    Procedure: BLOCK, NERVE, MEDIAL BRANCH L3,L4,L5;  Surgeon: Tj Mayfield MD;  Location: [facility] PAIN MGT;  Service: Pain Management;  Laterality: Bilateral;  1 of 2    INJECTION OF ANESTHETIC AGENT AROUND NERVE Bilateral 8/26/2021    Procedure: BLOCK, NERVE, MEDIAL BRANCH L3,L4,L5;  Surgeon: Tj Mayfield MD;  Location: [facility] PAIN MGT;  Service: Pain Management;  Laterality: Bilateral;  2 of 2    INJECTION OF ANESTHETIC AGENT AROUND NERVE Left 7/7/2022    Procedure: BLOCK, NERVE, LEFT OBTURATOR AND FEMORAL;  Surgeon: Tj Mayfield MD;  Location: [facility] PAIN MGT;  Service: Pain Management;  Laterality: Left;    INJECTION OF FACET JOINT Bilateral 3/12/2020    Procedure: FACET JOINT INJECTION (LUMBAR BLOCK) BILATERAL L4-5 AND L5-S1 DIRECT REFERRAL;  Surgeon: Tj Mayfield MD;  Location: [facility] PAIN MGT;  Service: Pain Management;  Laterality: Bilateral;  NEEDS CONSENT    INJECTION OF FACET JOINT Bilateral 3/29/2021    Procedure: INJECTION, FACET JOINT L3/4, L4/5, L5/S1;  Surgeon: Tj Mayfield MD;  Location: [facility] PAIN MGT;  Service: Pain Management;  Laterality: Bilateral;    INJECTION OF JOINT Right 7/30/2020    Procedure: INJECTION, JOINT, RIGHT HIP Interarticular under flouro;  Surgeon: Tj Mayfield MD;  Location: [facility] PAIN MGT;  Service: Pain Management;  Laterality: Right;  INJECTION, JOINT, RIGHT HIP Interarticular under flouro    INJECTION OF JOINT Right 2/21/2022    Procedure: Injection, Joint RIGHT ISCHIAL BURSA;  Surgeon: Tj Mayfield MD;  Location: [facility] PAIN MGT;   Service: Pain Management;  Laterality: Right;    INTRAMEDULLARY RODDING OF FEMUR Left 5/21/2019    Procedure: INSERTION, INTRAMEDULLARY ARIEL, FEMUR;  Surgeon: López Duff MD;  Location: [facility] OR 2ND FLR;  Service: Orthopedics;  Laterality: Left;    LENGTHENING OF TENDON OF LOWER EXTREMITY Left 1/11/2023    Procedure: LENGTHENING, TENDON, LOWER EXTREMITY;  Surgeon: Ariella Finnegan MD;  Location: [facility] OR;  Service: Orthopedics;  Laterality: Left;    MAGNETIC RESONANCE IMAGING N/A 5/29/2023    Procedure: MRI (Magnetic Resonance Imagine);  Surgeon: Sujey Surgeon;  Location: [facility] SUJEY;  Service: Anesthesiology;  Laterality: N/A;    RADIOFREQUENCY ABLATION Left 9/20/2021    Procedure: RADIOFREQUENCY ABLATION left L3,4,5 RFA  1 of 2  CONSENT NEEDED;  Surgeon: Tj Mayfield MD;  Location: [facility] PAIN MGT;  Service: Pain Management;  Laterality: Left;    RADIOFREQUENCY ABLATION Right 10/4/2021    Procedure: RADIOFREQUENCY ABLATION  right L3,4,5 RFA   2 of 2  CONSENT NEEDED;  Surgeon: Tj Mayfield MD;  Location: [facility] PAIN MGT;  Service: Pain Management;  Laterality: Right;    RADIOFREQUENCY ABLATION Left 4/21/2022    Procedure: Radiofrequency Ablation LEFT L3,L4,L5;  Surgeon: Tj Mayfield MD;  Location: [facility] PAIN MGT;  Service: Pain Management;  Laterality: Left;    RADIOFREQUENCY ABLATION Right 5/12/2022    Procedure: Radiofrequency Ablation RIGHT L3,L4,L5;  Surgeon: Tj Mayfield MD;  Location: [facility] PAIN MGT;  Service: Pain Management;  Laterality: Right;    RADIOFREQUENCY ABLATION Left 7/25/2022    Procedure: Radiofrequency Ablation Left Femoral & Obturator;  Surgeon: Tj Mayfield MD;  Location: [facility] PAIN MGT;  Service: Pain Management;  Laterality: Left;    REPAIR OF TRICEPS TENDON Left 10/17/2018    Procedure: REPAIR, TENDON, TRICEPS left elbow;  Surgeon: Staci Yarbrough MD;  Location: [facility] OR 1ST FLR;  Service: Orthopedics;  Laterality: Left;  Anesthesia: General and regional. PRONE, k-wire , hand pan 1 and  pan 2, CALL [employer] notified 10-12 LO    TONSILLECTOMY      TRANSFORAMINAL EPIDURAL INJECTION OF STEROID Right 8/31/2020    Procedure: INJECTION, STEROID, EPIDURAL, TRANSFORAMINAL,  APPROACH, L3-L4 and L4-L5 need consent;  Surgeon: Tj Mayfield MD;  Location: [facility] PAIN MGT;  Service: Pain Management;  Laterality: Right;    TRANSFORAMINAL EPIDURAL INJECTION OF STEROID Bilateral 7/23/2021    Procedure: INJECTION, STEROID, EPIDURAL, TRANSFORAMINAL APPROACH L4/5;  Surgeon: Tj Mayfield MD;  Location: [facility] PAIN MGT;  Service: Pain Management;  Laterality: Bilateral;    TRIGGER POINT INJECTION N/A 7/23/2021    Procedure: INJECTION, TRIGGER POINT SCAPULAR;  Surgeon: Tj Mayfield MD;  Location: [facility] PAIN MGT;  Service: Pain Management;  Laterality: N/A;    TUBAL LIGATION  2003    UPPER GASTROINTESTINAL ENDOSCOPY      10/11    uterine ablation  2003       Family History   Problem Relation Age of Onset    Cancer Mother         Lung Cancer    Emphysema Mother     Heart attack Mother     Alcohol abuse Mother     Cancer Father         Lung Cancer    Osteoarthritis Father     Lung cancer Father     Skin cancer Father     Alcohol abuse Father     Heart disease Brother     Heart attack Brother     Alcohol abuse Brother     Cirrhosis Brother     Osteoarthritis Paternal Aunt     Retinal detachment Maternal Aunt     No Known Problems Sister     No Known Problems Maternal Uncle     No Known Problems Paternal Uncle     No Known Problems Maternal Grandmother     No Known Problems Maternal Grandfather     No Known Problems Paternal Grandmother     No Known Problems Paternal Grandfather     Colon cancer Neg Hx     Esophageal cancer Neg Hx     Stomach cancer Neg Hx     Celiac disease Neg Hx     Diabetes Neg Hx     Thyroid disease Neg Hx     Amblyopia Neg Hx     Blindness Neg Hx     Cataracts Neg Hx     Glaucoma Neg Hx     Hypertension Neg Hx     Macular degeneration Neg Hx     Strabismus Neg Hx     Stroke Neg Hx        Review of  "patient's allergies indicates:   Allergen Reactions    Actemra [tocilizumab] Other (See Comments)     Severe dizziness    Codeine Nausea And Vomiting and Hives    Gold au 198 Hives and Rash    Hydroxychloroquine Other (See Comments)     Can't remember the reaction      Iodinated contrast media Other (See Comments)     Other reaction(s): BURNING ALL OVER    Iodine Other (See Comments) and Hives     Other reaction(s): BURNING ALL OVER - Iodine dye - Not topical    Ondansetron Nausea And Vomiting    Sulfa (sulfonamide antibiotics) Other (See Comments)     Can't remember the reaction    Zofran [ondansetron hcl (pf)] Nausea And Vomiting     Pt reports that last time she received zofran she started vomiting again    Methotrexate analogues Nausea Only    Pneumococcal vaccine Other (See Comments)    Pneumovax 23 [pneumococcal 23-argenis ps vaccine] Other (See Comments)     "sick"    Methotrexate Nausea Only         Current Outpatient Medications:     albuterol (PROVENTIL/VENTOLIN HFA) 90 mcg/actuation inhaler, Inhale 2 puffs into the lungs every 6 (six) hours as needed. Rescue, Disp: 20.1 g, Rfl: 11    budesonide-formoterol 160-4.5 mcg (SYMBICORT) 160-4.5 mcg/actuation HFAA, Inhale 2 puffs into the lungs every 12 (twelve) hours., Disp: 30.6 g, Rfl: 3    calcium citrate-vitamin D3 315-200 mg (CITRACAL+D) 315 mg-5 mcg (200 unit) per tablet, Take 1 tablet by mouth 2 (two) times daily. , Disp: , Rfl:     cycloSPORINE (RESTASIS) 0.05 % ophthalmic emulsion, PLACE 1 DROP INTO BOTH EYES TWICE A DAY, Disp: 180 each, Rfl: 3    diclofenac sodium (VOLTAREN) 1 % Gel, APPLY 2 GRAMS TOPICALLY 4 (FOUR) TIMES DAILY AS NEEDED., Disp: 300 g, Rfl: 2    fluconazole (DIFLUCAN) 150 MG Tab, Take 150 mg by mouth as needed., Disp: , Rfl:     HYDROmorphone (DILAUDID) 4 MG tablet, Take 0.5 tablets (2 mg total) by mouth every 8 (eight) hours as needed for Pain., Disp: 45 tablet, Rfl: 0    INV PROPULSID 10 MG, Take 2 tablets (20 mg) by mouth 4 (four) times " daily. FOR INVESTIGATIONAL USE ONLY. Protocol CIS-USA-154 Subject ID: [id number]., Disp: 1440 each, Rfl: 0    leflunomide (ARAVA) 20 MG Tab, TAKE 1 TABLET BY MOUTH EVERY DAY, Disp: 90 tablet, Rfl: 0    lifitegrast (XIIDRA) 5 % Dpet, INSTILL ONE DROP INTO BOTH EYES TWICE A DAY, Disp: 60 mL, Rfl: 10    methenamine (HIPREX) 1 gram Tab, Take 1 tablet (1 g total) by mouth 2 (two) times daily., Disp: 180 tablet, Rfl: 3    methocarbamoL (ROBAXIN) 750 MG Tab, Take 1 tablet (750 mg total) by mouth 3 (three) times daily., Disp: 90 tablet, Rfl: 0    methocarbamoL (ROBAXIN) 750 MG Tab, Take 1 tablet (750 mg total) by mouth 4 (four) times daily., Disp: 120 tablet, Rfl: 0    montelukast (SINGULAIR) 10 mg tablet, TAKE 1 TABLET BY MOUTH EVERY DAY AT NIGHT, Disp: 90 tablet, Rfl: 3    naproxen (NAPROSYN) 500 MG tablet, Take 1 tablet (500 mg total) by mouth 2 (two) times daily as needed (prn)., Disp: 180 tablet, Rfl: 1    omeprazole (PRILOSEC) 40 MG capsule, Take 1 capsule (40 mg total) by mouth every morning., Disp: 30 capsule, Rfl: 6    omeprazole-sodium bicarbonate (ZEGERID) 40-1.1 mg-gram per capsule, TAKE 1 CAPSULE BY MOUTH EVERY DAY IN THE MORNING, Disp: 90 capsule, Rfl: 3    potassium chloride SA (K-DUR,KLOR-CON) 20 MEQ tablet, Take 1 tablet (20 mEq total) by mouth once daily. Take with lasix, Disp: 5 tablet, Rfl: 0    POTASSIUM ORAL, Take by mouth once daily., Disp: , Rfl:     predniSONE (DELTASONE) 1 MG tablet, TAKE 5 TABLETS (5 MG TOTAL) BY MOUTH ONCE DAILY. (Patient taking differently: Take 5 mg by mouth once daily. Pt takes a total of 6 mg every morning.), Disp: 450 tablet, Rfl: 1    predniSONE (DELTASONE) 5 MG tablet, Take 1 tablet (5 mg total) by mouth once daily., Disp: 90 tablet, Rfl: 1    pregabalin (LYRICA) 100 MG capsule, Take 1 capsule (100 mg total) by mouth 3 (three) times daily., Disp: 90 capsule, Rfl: 2    PREMARIN vaginal cream, PLACE 0.5 GRAM VAGINALLY 3 (THREE) TIMES A WEEK., Disp: 30 g, Rfl: 1    promethazine  (PHENERGAN) 25 MG tablet, Take 1 tablet (25 mg total) by mouth every 6 (six) hours as needed for Nausea., Disp: 15 tablet, Rfl: 0    rosuvastatin (CRESTOR) 20 MG tablet, Take 1 tablet (20 mg total) by mouth every evening., Disp: 90 tablet, Rfl: 3    sarilumab (KEVZARA) 200 mg/1.14 mL Syrg, Inject 1.14 mL (200 mg total) into the skin every 14 (fourteen) days., Disp: 2.28 mL, Rfl: 1    sucralfate (CARAFATE) 1 gram tablet, TAKE 1 TABLET (1 G TOTAL) BY MOUTH 4 (FOUR) TIMES DAILY., Disp: 360 tablet, Rfl: 2    sucralfate (CARAFATE) 1 gram tablet, Take 1 tablet (1 g total) by mouth 4 (four) times daily., Disp: 360 tablet, Rfl: 3    albuterol-ipratropium (DUO-NEB) 2.5 mg-0.5 mg/3 mL nebulizer solution, Take 3 mLs by nebulization every 6 (six) hours as needed for Wheezing. Rescue, Disp: 90 mL, Rfl: 3    aspirin 325 MG tablet, Take 1 tablet (325 mg total) by mouth once daily. (Patient not taking: Reported on 5/26/2023), Disp: 30 tablet, Rfl: 0    furosemide (LASIX) 20 MG tablet, Take 1 tablet (20 mg total) by mouth once daily. (Patient not taking: Reported on 6/13/2023), Disp: 5 tablet, Rfl: 0    halobetasol propion-tazarotene (DUOBRII) 0.01-0.045 % Lotn, aaa on feet and hands qhs  then vaseline and warm towel (Patient not taking: Reported on 6/13/2023), Disp: 100 g, Rfl: 3    ketoconazole (NIZORAL) 2 % cream, Apply to feet twice daily for 3 weeks (Patient not taking: Reported on 6/13/2023), Disp: 60 g, Rfl: 3    magic mouthwash diphen/antac/lidoc, Swish and Spit 5 mL by mouth for 30 seconds twice daily. (Patient not taking: Reported on 6/13/2023), Disp: 150 mL, Rfl: 0    mirabegron (MYRBETRIQ) 25 mg Tb24 ER tablet, Take 1 tablet (25 mg total) by mouth once daily., Disp: 90 tablet, Rfl: 0    ROS:    Denies chest pain or palpitations.  Denies shortness of breath.  Denies fevers or chills.  Denies chest pain.  Denies abdominal pain.    PE:    HEENT:  Head normocephalic and atraumatic,  EOMI, sclera white.  Heart: RRR. Murmur  present.  Lungs: CTA.  Abdomen: Soft and non-tender.  Ext: NVI upper and lower extremities. Bilateral leg swelling; dependent edema. Patient cannot sit back in chair or lay flat.    Impression: Patient is cleared from PCP for surgery. Will see cardiology Monday for clearance.    Plan:     Joyce was seen today for pre-op exam.    Diagnoses and all orders for this visit:    Preop examination  -     IN OFFICE EKG 12-LEAD (to [place])    Other orders  -     iron sucrose (VENOFER) 200 mg in sodium chloride 0.9% 100 mL IVPB  -     sodium chloride 0.9% 250 mL flush bag  -     sodium chloride 0.9% flush 10 mL  -     heparin, porcine (PF) 100 unit/mL injection flush 500 Units  -     alteplase injection 2 mg  -     EPINEPHrine (EPIPEN) 0.3 mg/0.3 mL pen injection 0.3 mg  -     diphenhydrAMINE injection 50 mg  -     methylPREDNISolone sodium succinate injection 125 mg  -     sodium chloride 0.9% bolus 1,000 mL 1,000 mL        Justin Wesley NP   [facility]  6/13/23

## 2023-06-14 ENCOUNTER — PATIENT MESSAGE (OUTPATIENT)
Dept: ORTHOPEDICS | Facility: CLINIC | Age: 63
End: 2023-06-14
Payer: MEDICARE

## 2023-06-14 NOTE — DISCHARGE INSTRUCTIONS
Your surgery has been scheduled for:_________6/22/23_________________________________    You should report to:  ____[facility], located on the [place] side of the first floor of the           [facility] ([phone])  _x___[facility], located on the [facility] side of the            Second floor of the [facility] ([phone])  ____3rd Floor [facility] located on the [facility] side of the [facility] [phone]  ____[facility]: located at [address] [facility] [place] [address]. [address].     Please Note   Tell your doctor if you take Aspirin, products containing Aspirin, herbal medications  or blood thinners, such as Coumadin, Ticlid, or Plavix.  (Consult your provider regarding holding or stopping before surgery).  Arrange for someone to drive you home following surgery.  You will not be allowed to leave the surgical facility alone or drive yourself home following sedation and anesthesia.    Before Surgery  Stop taking all herbal medications, vitamins, and supplements 7 days prior to surgery  No Motrin/Advil (Ibuprofen) 7 days before surgery  No Aleve (Naproxen) 7 days before surgery  Stop Taking Asprin, products containing Asprin __7___days before surgery  Stop taking blood thinners_______days before surgery  No Goody's/BC  Powder 7 days before surgery  Refrain from drinking alcoholic beverages for 24hours before and after surgery.  HOLD Kevzara after today, can resume 2 wks post op when sutures out and wound checked.   Continue leflunomide and prednisone 5 mg daily with hydrocortisone.  Stop or limit smoking ______7___days before surgery  You may take Tylenol for pain    Night before Surgery  Do not eat or drink after midnight  Take a shower or bath (shower is recommended).  Bathe with Hibiclens soap or an antibacterial soap from the neck down.  If not supplied by your surgeon,  hibiclens soap will need to be purchased over the counter in pharmacy.  Rinse soap off thoroughly.  Shampoo your hair with your regular shampoo    The Day of Surgery  Take another bath or shower with hibiclens or any antibacterial soap, to reduce the chance of infection.  Take heart and blood pressure medications with a small sip of water, as advised by the perioperative team.  Do not take fluid pills  You may brush your teeth and rinse your mouth, but do not swall any additional water.   Do not apply perfumes, powder, body lotions or deodorant on the day of surgery.  Nail polish should be removed.  Do not wear makeup or moisturizer  Wear comfortable clothes, such as a button front shirt and loose fitting pants.  Leave all jewelry, including body piercings, and valuables at home.    Bring any devices you will neeed after surgery such as crutches or canes.  If you have sleep apnea, please bring your CPAP machine  In the event that your physical condition changes including the onset of a cold or respiratory illness, or if you have to delay or cancel your surgery, please notify your surgeon.     Anesthesia: General Anesthesia     You are watched continuously during your procedure by your anesthesia provider.     Youre due to have surgery. During surgery, youll be given medicine called anesthesia or anesthetic. This will keep you comfortable and pain-free. Your anesthesia provider will use general anesthesia.  What is general anesthesia?  General anesthesia puts you into a state like deep sleep. It goes into the bloodstream (IV anesthetics), into the lungs (gas anesthetics), or both. You feel nothing during the procedure. You will not remember it. During the procedure, the anesthesia provider monitors you continuously. He or she checks your heart rate and rhythm, blood pressure, breathing, and blood oxygen.  IV anesthetics. IV anesthetics are given through an IV line in your arm. Theyre often given first. This is so  you are asleep before a gas anesthetic is started. Some kinds of IV anesthetics relieve pain. Others relax you. Your doctor will decide which kind is best in your case.  Gas anesthetics. Gas anesthetics are breathed into the lungs. They are often used to keep you asleep. They can be given through a facemask or a tube placed in your larynx or trachea (breathing tube).  If you have a facemask, your anesthesia provider will most likely place it over your nose and mouth while youre still awake. Youll breathe oxygen through the mask as your IV anesthetic is started. Gas anesthetic may be added through the mask.  If you have a tube in the larynx or trachea, it will be inserted into your throat after youre asleep.  Anesthesia tools and medicines  You will likely have:  IV anesthetics. These are put into an IV line into your bloodstream.  Gas anesthetics. You breathe these anesthetics into your lungs, where they pass into your bloodstream.  Pulse oximeter. This is a small clip that is attached to the end of your finger. This measures your blood oxygen level.  Electrocardiography leads (electrodes). These are small sticky pads that are placed on your chest. They record your heart rate and rhythm.  Blood pressure cuff. This reads your blood pressure.  Risks and possible complications  General anesthesia has some risks. These include:  Breathing problems  Nausea and vomiting  Sore throat or hoarseness (usually temporary)  Allergic reaction to the anesthetic  Irregular heartbeat (rare)  Cardiac arrest (rare)   Anesthesia safety  Follow all instructions you are given for how long not to eat or drink before your procedure.  Be sure your doctor knows what medicines and drugs you take. This includes over-the-counter medicines, herbs, supplements, alcohol or other drugs. You will be asked when those were last taken.  Have an adult family member or friend drive you home after the procedure.  For the first 24 hours after your  surgery:  Do not drive or use heavy equipment.  Do not make important decisions or sign legal documents. If important decisions or signing legal documents is necessary during the first 24 hours after surgery, have a trusted family member or spouse act on your behalf.  Avoid alcohol.  Have a responsible adult stay with you. He or she can watch for problems and help keep you safe.  Date Last Reviewed: 12/1/2016  © 6420-1690 [employer]. [address] [place] [address], [place] [address]. All rights reserved. This information is not intended as a substitute for professional medical care. Always follow your healthcare professional's instructions.

## 2023-06-15 ENCOUNTER — OFFICE VISIT (OUTPATIENT)
Dept: UROLOGY | Facility: CLINIC | Age: 63
End: 2023-06-15
Payer: MEDICARE

## 2023-06-15 ENCOUNTER — HOSPITAL ENCOUNTER (OUTPATIENT)
Dept: PREADMISSION TESTING | Facility: HOSPITAL | Age: 63
Discharge: HOME | End: 2023-06-15
Attending: ANESTHESIOLOGY | Admitting: ANESTHESIOLOGY
Payer: MEDICARE

## 2023-06-15 ENCOUNTER — PATIENT MESSAGE (OUTPATIENT)
Dept: ORTHOPEDICS | Facility: CLINIC | Age: 63
End: 2023-06-15
Payer: MEDICARE

## 2023-06-15 VITALS
DIASTOLIC BLOOD PRESSURE: 96 MMHG | SYSTOLIC BLOOD PRESSURE: 123 MMHG | TEMPERATURE: 99 F | HEIGHT: 61 IN | OXYGEN SATURATION: 96 % | BODY MASS INDEX: 27.12 KG/M2 | RESPIRATION RATE: 18 BRPM | WEIGHT: 143.63 LBS | HEART RATE: 96 BPM

## 2023-06-15 DIAGNOSIS — N39.41 URGE URINARY INCONTINENCE: ICD-10-CM

## 2023-06-15 DIAGNOSIS — Z87.440 HISTORY OF RECURRENT UTIS: Primary | ICD-10-CM

## 2023-06-15 DIAGNOSIS — R39.15 URINARY URGENCY: ICD-10-CM

## 2023-06-15 PROCEDURE — 1159F MED LIST DOCD IN RCRD: CPT | Mod: CPTII,95,, | Performed by: NURSE PRACTITIONER

## 2023-06-15 PROCEDURE — 99212 OFFICE O/P EST SF 10 MIN: CPT | Mod: 95,,, | Performed by: NURSE PRACTITIONER

## 2023-06-15 PROCEDURE — 1160F RVW MEDS BY RX/DR IN RCRD: CPT | Mod: CPTII,95,, | Performed by: NURSE PRACTITIONER

## 2023-06-15 PROCEDURE — 99212 PR OFFICE/OUTPT VISIT, EST, LEVL II, 10-19 MIN: ICD-10-PCS | Mod: 95,,, | Performed by: NURSE PRACTITIONER

## 2023-06-15 PROCEDURE — 1159F PR MEDICATION LIST DOCUMENTED IN MEDICAL RECORD: ICD-10-PCS | Mod: CPTII,95,, | Performed by: NURSE PRACTITIONER

## 2023-06-15 PROCEDURE — 1160F PR REVIEW ALL MEDS BY PRESCRIBER/CLIN PHARMACIST DOCUMENTED: ICD-10-PCS | Mod: CPTII,95,, | Performed by: NURSE PRACTITIONER

## 2023-06-15 RX ORDER — METHENAMINE HIPPURATE 1000 MG/1
1 TABLET ORAL 2 TIMES DAILY
Qty: 180 TABLET | Refills: 3 | Status: SHIPPED | OUTPATIENT
Start: 2023-06-15 | End: 2024-06-14

## 2023-06-15 RX ORDER — MIRABEGRON 25 MG/1
25 TABLET, FILM COATED, EXTENDED RELEASE ORAL DAILY
Qty: 90 TABLET | Refills: 3 | Status: SHIPPED | OUTPATIENT
Start: 2023-06-15 | End: 2023-08-17 | Stop reason: SDUPTHER

## 2023-06-15 NOTE — PROGRESS NOTES
Subjective:       Patient ID: Joyce Peterson is a 62 y.o. female.    Chief Complaint: Annual Exam (OAB and hx of recurrent UTIs)    Patient is a 61 yo WF who is here today via Virtual Video for an annual urology exam. Patient reports she is doing well from a urology standpoint. She has a hx of OAB and recurrent UTIs. Patient is taking mirabegron 25 mg and methenamine 1 gram for those issues. Medications are effective. She reports she has an upcoming back surgery on L4-5 on 6/22/2023.     Follow-up  This is a chronic (OAB and recurrent UTIs) problem. The current episode started more than 1 year ago. The problem has been resolved. Pertinent negatives include no abdominal pain, change in bowel habit, chills, fatigue, fever, headaches, nausea, swollen glands, urinary symptoms, vomiting or weakness. Nothing aggravates the symptoms. Treatments tried: mirabegron 25 mg daily and methenamine 1 gram BID. The treatment provided significant relief.   Review of Systems   Constitutional:  Negative for chills, fatigue and fever.   Gastrointestinal: Negative.  Negative for abdominal pain, change in bowel habit, nausea, vomiting and change in bowel habit.   Genitourinary:  Negative for decreased urine volume, difficulty urinating, dysuria, frequency, hematuria, nocturia and urgency.   Musculoskeletal:  Positive for back pain.   Neurological:  Negative for dizziness, weakness and headaches.   Psychiatric/Behavioral: Negative.         Objective:      Physical Exam  Vitals and nursing note reviewed.   Constitutional:       General: She is not in acute distress.     Appearance: She is well-developed. She is not ill-appearing.   HENT:      Head: Normocephalic and atraumatic.   Eyes:      Extraocular Movements: Extraocular movements intact.   Pulmonary:      Effort: Pulmonary effort is normal. No respiratory distress.   Musculoskeletal:      Cervical back: Normal range of motion.   Neurological:      Mental Status: She is alert and  oriented to person, place, and time.      Coordination: Coordination normal.   Psychiatric:         Mood and Affect: Mood normal.         Behavior: Behavior normal.         Thought Content: Thought content normal.         Judgment: Judgment normal.       Assessment:       Problem List Items Addressed This Visit          Renal/    Urge urinary incontinence    Relevant Medications    mirabegron (MYRBETRIQ) 25 mg Tb24 ER tablet     Other Visit Diagnoses       History of recurrent UTIs    -  Primary    Relevant Medications    methenamine (HIPREX) 1 gram Tab    Urinary urgency        Relevant Medications    mirabegron (MYRBETRIQ) 25 mg Tb24 ER tablet            Plan:           The patient location is: Home  The chief complaint leading to consultation is: Annual urology exam    Visit type: audiovisual      Approximately 15 minutes of total time spent on the encounter, which includes face to face time and non-face to face time preparing to see the patient (eg, review of tests), Obtaining and/or reviewing separately obtained history, Documenting clinical information in the electronic or other health record, Independently interpreting results (not separately reported) and communicating results to the patient/family/caregiver, or Care coordination (not separately reported).         Each patient to whom he or she provides medical services by telemedicine is:  (1) informed of the relationship between the physician and patient and the respective role of any other health care provider with respect to management of the patient; and (2) notified that he or she may decline to receive medical services by telemedicine and may withdraw from such care at any time.    Notes:   Patient is a 63 yo WF who is here today via Virtual Video for an annual urology exam. Patient reports she is doing well from a urology standpoint. She has a hx of OAB and recurrent UTIs. Patient is taking mirabegron 25 mg and methenamine 1 gram for those issues.  Medications are effective. She reports she has an upcoming back surgery on L4-5 on 6/22/2023.     Joyce was seen today for annual exam.    Diagnoses and all orders for this visit:    History of recurrent UTIs  -     methenamine (HIPREX) 1 gram Tab; Take 1 tablet (1 g total) by mouth 2 (two) times daily. REFILLED    Urinary urgency  -     mirabegron (MYRBETRIQ) 25 mg Tb24 ER tablet; Take 1 tablet (25 mg total) by mouth once daily. REFILLED    Urge urinary incontinence  -     mirabegron (MYRBETRIQ) 25 mg Tb24 ER tablet; Take 1 tablet (25 mg total) by mouth once daily. REFILLED    Other order  Continue taking mirabegron 26 mg daily for OAB and methenamine 1 gram BID for UTI prevention.     Follow-up in 1 year or sooner if needed.     Anastasia Healy NP

## 2023-06-18 ENCOUNTER — PATIENT MESSAGE (OUTPATIENT)
Dept: PAIN MEDICINE | Facility: CLINIC | Age: 63
End: 2023-06-18
Payer: MEDICARE

## 2023-06-18 ENCOUNTER — PATIENT MESSAGE (OUTPATIENT)
Dept: PAIN MEDICINE | Facility: OTHER | Age: 63
End: 2023-06-18
Payer: MEDICARE

## 2023-06-18 DIAGNOSIS — M47.26 OSTEOARTHRITIS OF SPINE WITH RADICULOPATHY, LUMBAR REGION: ICD-10-CM

## 2023-06-18 DIAGNOSIS — M53.3 SACROILIAC DYSFUNCTION: ICD-10-CM

## 2023-06-18 DIAGNOSIS — M54.9 INTRACTABLE BACK PAIN: ICD-10-CM

## 2023-06-18 DIAGNOSIS — M25.551 PAIN OF RIGHT HIP: ICD-10-CM

## 2023-06-18 DIAGNOSIS — M54.16 LUMBAR RADICULOPATHY: ICD-10-CM

## 2023-06-19 ENCOUNTER — OFFICE VISIT (OUTPATIENT)
Dept: CARDIOLOGY | Facility: CLINIC | Age: 63
End: 2023-06-19
Payer: MEDICARE

## 2023-06-19 ENCOUNTER — PATIENT MESSAGE (OUTPATIENT)
Dept: HEMATOLOGY/ONCOLOGY | Facility: CLINIC | Age: 63
End: 2023-06-19
Payer: MEDICARE

## 2023-06-19 DIAGNOSIS — E78.00 PURE HYPERCHOLESTEROLEMIA: ICD-10-CM

## 2023-06-19 DIAGNOSIS — I25.10 CORONARY ARTERY DISEASE INVOLVING NATIVE CORONARY ARTERY OF NATIVE HEART WITHOUT ANGINA PECTORIS: Primary | ICD-10-CM

## 2023-06-19 DIAGNOSIS — M05.79 RHEUMATOID ARTHRITIS INVOLVING MULTIPLE SITES WITH POSITIVE RHEUMATOID FACTOR: Chronic | ICD-10-CM

## 2023-06-19 DIAGNOSIS — D50.0 IRON DEFICIENCY ANEMIA SECONDARY TO BLOOD LOSS (CHRONIC): ICD-10-CM

## 2023-06-19 DIAGNOSIS — Z79.890 HORMONE REPLACEMENT THERAPY (HRT): ICD-10-CM

## 2023-06-19 DIAGNOSIS — R93.1 AGATSTON CORONARY ARTERY CALCIUM SCORE GREATER THAN 400: ICD-10-CM

## 2023-06-19 DIAGNOSIS — Z82.49 FAMILY HISTORY OF EARLY CAD: ICD-10-CM

## 2023-06-19 DIAGNOSIS — M35.00 SJOGREN'S SYNDROME, WITH UNSPECIFIED ORGAN INVOLVEMENT: ICD-10-CM

## 2023-06-19 PROCEDURE — 99213 PR OFFICE/OUTPT VISIT, EST, LEVL III, 20-29 MIN: ICD-10-PCS | Mod: 95,,, | Performed by: INTERNAL MEDICINE

## 2023-06-19 PROCEDURE — 99213 OFFICE O/P EST LOW 20 MIN: CPT | Mod: 95,,, | Performed by: INTERNAL MEDICINE

## 2023-06-19 RX ORDER — METHOCARBAMOL 750 MG/1
750 TABLET, FILM COATED ORAL 4 TIMES DAILY
Qty: 120 TABLET | Refills: 0 | Status: SHIPPED | OUTPATIENT
Start: 2023-06-19 | End: 2024-01-16 | Stop reason: SDUPTHER

## 2023-06-19 RX ORDER — HYDROMORPHONE HYDROCHLORIDE 4 MG/1
2 TABLET ORAL EVERY 8 HOURS PRN
Qty: 45 TABLET | Refills: 0 | Status: SHIPPED | OUTPATIENT
Start: 2023-06-19 | End: 2023-07-18

## 2023-06-19 NOTE — TELEPHONE ENCOUNTER
Patient requesting refill on Dilaudid 4mg  Last office visit 5.24.23   shows last refill on 05.23.22  Patient does not have a pain contract on file with [facility] department  Patient last UDS NO Uds on file was not consistent with current therapy

## 2023-06-19 NOTE — PROGRESS NOTES
Subjective:    Patient ID:  Joyce Peterson is a 62 y.o. female who presents for follow-up of No chief complaint on file.      HPIVirtual visit      The patient is a 62 year old female  under care of Dr Moran for Rheumtoid arthritis. Her bother had  of a heart attack at 58. Serial CACs were 123 in  and 668 in 1919.She presents for clearance for lumbar spinal fusion 23. She was seen by Ms Lupillo PACHECO last week and her PE was reported as normal. EKG is normal  Lab Results   Component Value Date     (L) 2023    K 3.8 2023     2023    CO2 24 2023    BUN 12 2023    CREATININE 0.63 2023     2023    HGBA1C 5.2 03/15/2022    MG 2.1 2022    AST 40 2023    ALT 33 2023    ALBUMIN 3.7 2023    PROT 5.8 (L) 2023    BILITOT 0.6 2023    WBC 4.78 2023    HGB 11.2 (L) 2023    HCT 35.7 (L) 2023    MCV 83 2023     2023    INR 1.0 06/15/2023    TSH 0.619 10/18/2022         Lab Results   Component Value Date    CHOL 183 10/03/2022    HDL 76 (H) 10/03/2022    TRIG 127 10/03/2022       Lab Results   Component Value Date    LDLCALC 81.6 10/03/2022       Past Medical History:   Diagnosis Date    Acid reflux     Allergy     Anemia     Asthma     Coronary artery disease     CS (cervical spondylosis) 2013    Degenerative disc disease     Degenerative joint disease of hindfoot, left 2022    Dry eyes     Dry mouth     Gastroparesis     History of methotrexate therapy 2022    Hyperlipidemia     Lateral meniscus derangement 2016    Lobular carcinoma in situ     Lumbar spondylosis 2013    Osteoarthritis     Osteoporosis     Pes planovalgus, acquired, left 2022    Rheumatoid arthritis(714.0)     Rupture of left triceps tendon 10/17/2018    Umbilical hernia 2015       Current Outpatient Medications:     albuterol (PROVENTIL/VENTOLIN HFA) 90 mcg/actuation inhaler,  Inhale 2 puffs into the lungs every 6 (six) hours as needed. Rescue, Disp: 20.1 g, Rfl: 11    albuterol-ipratropium (DUO-NEB) 2.5 mg-0.5 mg/3 mL nebulizer solution, Take 3 mLs by nebulization every 6 (six) hours as needed for Wheezing. Rescue, Disp: 90 mL, Rfl: 3    aspirin 325 MG tablet, Take 1 tablet (325 mg total) by mouth once daily. (Patient not taking: Reported on 5/26/2023), Disp: 30 tablet, Rfl: 0    budesonide-formoterol 160-4.5 mcg (SYMBICORT) 160-4.5 mcg/actuation HFAA, Inhale 2 puffs into the lungs every 12 (twelve) hours., Disp: 30.6 g, Rfl: 3    calcium citrate-vitamin D3 315-200 mg (CITRACAL+D) 315 mg-5 mcg (200 unit) per tablet, Take 1 tablet by mouth 2 (two) times daily. , Disp: , Rfl:     cycloSPORINE (RESTASIS) 0.05 % ophthalmic emulsion, PLACE 1 DROP INTO BOTH EYES TWICE A DAY, Disp: 180 each, Rfl: 3    diclofenac sodium (VOLTAREN) 1 % Gel, APPLY 2 GRAMS TOPICALLY 4 (FOUR) TIMES DAILY AS NEEDED., Disp: 300 g, Rfl: 2    fluconazole (DIFLUCAN) 150 MG Tab, Take 150 mg by mouth as needed., Disp: , Rfl:     furosemide (LASIX) 20 MG tablet, Take 1 tablet (20 mg total) by mouth once daily. (Patient not taking: Reported on 6/13/2023), Disp: 5 tablet, Rfl: 0    halobetasol propion-tazarotene (DUOBRII) 0.01-0.045 % Lotn, aaa on feet and hands qhs  then vaseline and warm towel (Patient not taking: Reported on 6/13/2023), Disp: 100 g, Rfl: 3    HYDROmorphone (DILAUDID) 4 MG tablet, Take 0.5 tablets (2 mg total) by mouth every 8 (eight) hours as needed for Pain., Disp: 45 tablet, Rfl: 0    INV PROPULSID 10 MG, Take 2 tablets (20 mg) by mouth 4 (four) times daily. FOR INVESTIGATIONAL USE ONLY. Protocol CIS-[id number] Subject ID: [id number]., Disp: 1440 each, Rfl: 0    ketoconazole (NIZORAL) 2 % cream, Apply to feet twice daily for 3 weeks (Patient not taking: Reported on 6/13/2023), Disp: 60 g, Rfl: 3    leflunomide (ARAVA) 20 MG Tab, TAKE 1 TABLET BY MOUTH EVERY DAY, Disp: 90 tablet, Rfl: 0    lifitegrast (XIIDRA) 5  % Dpet, INSTILL ONE DROP INTO BOTH EYES TWICE A DAY, Disp: 60 mL, Rfl: 10    magic mouthwash diphen/antac/lidoc, Swish and Spit 5 mL by mouth for 30 seconds twice daily. (Patient not taking: Reported on 6/13/2023), Disp: 150 mL, Rfl: 0    methenamine (HIPREX) 1 gram Tab, Take 1 tablet (1 g total) by mouth 2 (two) times daily., Disp: 180 tablet, Rfl: 3    methocarbamoL (ROBAXIN) 750 MG Tab, Take 1 tablet (750 mg total) by mouth 3 (three) times daily., Disp: 90 tablet, Rfl: 0    methocarbamoL (ROBAXIN) 750 MG Tab, Take 1 tablet (750 mg total) by mouth 4 (four) times daily., Disp: 120 tablet, Rfl: 0    mirabegron (MYRBETRIQ) 25 mg Tb24 ER tablet, Take 1 tablet (25 mg total) by mouth once daily., Disp: 90 tablet, Rfl: 3    montelukast (SINGULAIR) 10 mg tablet, TAKE 1 TABLET BY MOUTH EVERY DAY AT NIGHT, Disp: 90 tablet, Rfl: 3    naproxen (NAPROSYN) 500 MG tablet, Take 1 tablet (500 mg total) by mouth 2 (two) times daily as needed (prn)., Disp: 180 tablet, Rfl: 1    omeprazole (PRILOSEC) 40 MG capsule, Take 1 capsule (40 mg total) by mouth every morning., Disp: 30 capsule, Rfl: 6    omeprazole-sodium bicarbonate (ZEGERID) 40-1.1 mg-gram per capsule, TAKE 1 CAPSULE BY MOUTH EVERY DAY IN THE MORNING, Disp: 90 capsule, Rfl: 3    potassium chloride SA (K-DUR,KLOR-CON) 20 MEQ tablet, Take 1 tablet (20 mEq total) by mouth once daily. Take with lasix, Disp: 5 tablet, Rfl: 0    POTASSIUM ORAL, Take by mouth once daily., Disp: , Rfl:     predniSONE (DELTASONE) 1 MG tablet, TAKE 5 TABLETS (5 MG TOTAL) BY MOUTH ONCE DAILY. (Patient taking differently: Take 5 mg by mouth once daily. Pt takes a total of 6 mg every morning.), Disp: 450 tablet, Rfl: 1    predniSONE (DELTASONE) 5 MG tablet, Take 1 tablet (5 mg total) by mouth once daily., Disp: 90 tablet, Rfl: 1    pregabalin (LYRICA) 100 MG capsule, Take 1 capsule (100 mg total) by mouth 3 (three) times daily., Disp: 90 capsule, Rfl: 2    PREMARIN vaginal cream, PLACE 0.5 GRAM  VAGINALLY 3 (THREE) TIMES A WEEK., Disp: 30 g, Rfl: 1    promethazine (PHENERGAN) 25 MG tablet, Take 1 tablet (25 mg total) by mouth every 6 (six) hours as needed for Nausea., Disp: 15 tablet, Rfl: 0    rosuvastatin (CRESTOR) 20 MG tablet, Take 1 tablet (20 mg total) by mouth every evening., Disp: 90 tablet, Rfl: 3    sarilumab (KEVZARA) 200 mg/1.14 mL Syrg, Inject 1.14 mL (200 mg total) into the skin every 14 (fourteen) days., Disp: 2.28 mL, Rfl: 1    sucralfate (CARAFATE) 1 gram tablet, TAKE 1 TABLET (1 G TOTAL) BY MOUTH 4 (FOUR) TIMES DAILY., Disp: 360 tablet, Rfl: 2    sucralfate (CARAFATE) 1 gram tablet, Take 1 tablet (1 g total) by mouth 4 (four) times daily., Disp: 360 tablet, Rfl: 3          Review of Systems   Constitutional: Negative for decreased appetite, diaphoresis, fever, malaise/fatigue, weight gain and weight loss.   HENT:  Negative for congestion, ear discharge, ear pain and nosebleeds.    Eyes:  Negative for blurred vision, double vision and visual disturbance.   Cardiovascular:  Negative for chest pain, claudication, cyanosis, dyspnea on exertion, irregular heartbeat, leg swelling, near-syncope, orthopnea, palpitations, paroxysmal nocturnal dyspnea and syncope.   Respiratory:  Negative for cough, hemoptysis, shortness of breath, sleep disturbances due to breathing, snoring, sputum production and wheezing.    Endocrine: Negative for polydipsia, polyphagia and polyuria.   Hematologic/Lymphatic: Negative for adenopathy and bleeding problem. Does not bruise/bleed easily.   Skin:  Negative for color change, nail changes, poor wound healing and rash.   Musculoskeletal:  Positive for back pain. Negative for muscle cramps and muscle weakness.   Gastrointestinal:  Negative for abdominal pain, anorexia, change in bowel habit, hematochezia, nausea and vomiting.   Genitourinary:  Negative for dysuria, frequency and hematuria.   Neurological:  Negative for brief paralysis, difficulty with concentration,  excessive daytime sleepiness, dizziness, focal weakness, headaches, light-headedness, seizures, vertigo and weakness.   Psychiatric/Behavioral:  Negative for altered mental status and depression.    Allergic/Immunologic: Negative for persistent infections.      Objective:LMP  (LMP Unknown)             Physical Exam      Assessment:       1. Coronary artery disease involving native coronary artery of native heart without angina pectoris    2. Hormone replacement therapy (HRT)    3. Sjogren's syndrome, with unspecified organ involvement    4. Rheumatoid arthritis involving multiple sites with positive rheumatoid factor    5. Pure hypercholesterolemia    6. Agatston coronary artery calcium score greater than 400    7. Family history of early CAD    8. Iron deficiency anemia secondary to blood loss (chronic)         Plan:       Diagnoses and all orders for this visit:    Coronary artery disease involving native coronary artery of native heart without angina pectoris    Hormone replacement therapy (HRT)    Sjogren's syndrome, with unspecified organ involvement    Rheumatoid arthritis involving multiple sites with positive rheumatoid factor    Pure hypercholesterolemia    Agatston coronary artery calcium score greater than 400    Family history of early CAD    Iron deficiency anemia secondary to blood loss (chronic) NSAIDs    Based on all available imformation she is at low CV risk for her planned lumbar spinal fusion

## 2023-06-21 ENCOUNTER — ANESTHESIA EVENT (OUTPATIENT)
Dept: SURGERY | Facility: HOSPITAL | Age: 63
DRG: 454 | End: 2023-06-21
Payer: MEDICARE

## 2023-06-21 ENCOUNTER — OFFICE VISIT (OUTPATIENT)
Dept: ORTHOPEDICS | Facility: CLINIC | Age: 63
End: 2023-06-21
Payer: MEDICARE

## 2023-06-21 ENCOUNTER — TELEPHONE (OUTPATIENT)
Dept: ORTHOPEDICS | Facility: CLINIC | Age: 63
End: 2023-06-21
Payer: MEDICARE

## 2023-06-21 VITALS — HEIGHT: 61 IN | BODY MASS INDEX: 27.09 KG/M2 | WEIGHT: 143.5 LBS

## 2023-06-21 DIAGNOSIS — M43.10 SPONDYLOLISTHESIS, ACQUIRED: Primary | ICD-10-CM

## 2023-06-21 PROCEDURE — 3008F BODY MASS INDEX DOCD: CPT | Mod: CPTII,S$GLB,, | Performed by: ORTHOPAEDIC SURGERY

## 2023-06-21 PROCEDURE — 99214 PR OFFICE/OUTPT VISIT, EST, LEVL IV, 30-39 MIN: ICD-10-PCS | Mod: S$GLB,,, | Performed by: ORTHOPAEDIC SURGERY

## 2023-06-21 PROCEDURE — 99999 PR PBB SHADOW E&M-EST. PATIENT-LVL IV: ICD-10-PCS | Mod: PBBFAC,,, | Performed by: ORTHOPAEDIC SURGERY

## 2023-06-21 PROCEDURE — 3008F PR BODY MASS INDEX (BMI) DOCUMENTED: ICD-10-PCS | Mod: CPTII,S$GLB,, | Performed by: ORTHOPAEDIC SURGERY

## 2023-06-21 PROCEDURE — 99999 PR PBB SHADOW E&M-EST. PATIENT-LVL IV: CPT | Mod: PBBFAC,,, | Performed by: ORTHOPAEDIC SURGERY

## 2023-06-21 PROCEDURE — 99214 OFFICE O/P EST MOD 30 MIN: CPT | Mod: S$GLB,,, | Performed by: ORTHOPAEDIC SURGERY

## 2023-06-21 NOTE — TELEPHONE ENCOUNTER
Spoke with patient and informed her of surgery date and time and she agreed verbally. Informed patient of area to park and where to go that morning to sign in (Day of Surgery). No eating or drinking after 12 am. And wash with Angy-Hex 4. Also wash hair night before surgery. Patient agreed verbally.  ARRIVAL TIME 11 AM.

## 2023-06-21 NOTE — H&P
DATE: 6/7/23  PATIENT: Joyce Peterson      CHIEF COMPLAINT: low back and bilateral leg pain    HISTORY:  Joyce Peterson is a 62 y.o. female with a pmhx of sjogrens and RA with chronic steroids (on prolia, hx of multiple fx), steroid induced osteoporosis,  here for evaluation of low back and bilateral leg pain (Back - 7, Leg - 7).  The pain in the lower back and behind both legs is what bothers her most.  The pain has been present for years, worsening over time. The patient describes the pain as aching and radiating down the back of both legs.  The pain is worse with walking and laying and improved by leaning on the shopping cart. There is positive associated numbness and tingling. There is negative subjective weakness. Prior treatments have included healthy back program, medications, and multiple pain management procedures, but no surgery. The pt has been largely using her wheelchair when she leaves the house due to pain. She takes dilaudid PO bid, robaxin and lyrica.     The patient denies myelopathic symptoms such as handwriting changes or difficulty with buttons/coins/keys. Denies perineal paresthesias, bowel/bladder dysfunction.    Pain Procedures:   3/12/20 Bilateral L4/5 and L5/S1 facet injection- significant benefit of back pain  7/30/20 Right hip injection- no relief   8/31/20 Right L3/4 and L4/5 TF MADELINE- 60% relief of leg pain  3/29/21 Bilateral L3/4, L4/5 and L5/S1 facet injections  7/21/21 Bilateral L4/5 TF MADELINE- limited benefit  8/23/21 Bilateral L3,4,5 MBB- significant short term benefit  8/26/21 Bilateral L3,4,5 MBB- significant short term benefit  9/20/21 Left L3,4,5 RFA- 80% relief  10/4/21 Right L3,4,5 RFA- 80% relief  4/12/22 Left L3,4,5 RFA- 80% relief  5/12/22 Right L3,4,5 RFA- 80% relief  7/25/22 Left Femoral and Left obturator Coolief RFA  9/29/22 L3-4 MADELINE  12/12/22 L5-S1 MADELINE  4/6/23 L5-S1 MADELINE    PAST MEDICAL/SURGICAL HISTORY:  Past Medical History:   Diagnosis Date    Acid reflux      Allergy     Anemia     Asthma     Coronary artery disease     CS (cervical spondylosis) 03/08/2013    Degenerative disc disease     Degenerative joint disease of hindfoot, left 6/28/2022    Dry eyes     Dry mouth     Gastroparesis     History of methotrexate therapy 01/19/2022    Hyperlipidemia     Lateral meniscus derangement 04/06/2016    Lobular carcinoma in situ     Lumbar spondylosis 03/08/2013    Osteoarthritis     Osteoporosis     Pes planovalgus, acquired, left 6/28/2022    Rheumatoid arthritis(714.0)     Rupture of left triceps tendon 10/17/2018    Umbilical hernia 08/13/2015     Past Surgical History:   Procedure Laterality Date    BREAST BIOPSY Left 01/29/2002    core bx    CARPAL TUNNEL RELEASE Right 05/2017    CHOLECYSTECTOMY  2004    COLONOSCOPY      10/11    COLONOSCOPY N/A 6/29/2017    Procedure: COLONOSCOPY;  Surgeon: López Moore MD;  Location: [facility] ENDO ([address] [place] [address]);  Service: Endoscopy;  Laterality: N/A;    EPIDURAL STEROID INJECTION N/A 11/18/2021    Procedure: INJECTION, STEROID, EPIDURAL, L5-S1 IL NEED CONSENT;  Surgeon: Tj Mayfield MD;  Location: [facility] PAIN MGT;  Service: Pain Management;  Laterality: N/A;    EPIDURAL STEROID INJECTION N/A 9/29/2022    Procedure: LUMBAR L3/L4 IL MADELINE CONTRAST;  Surgeon: Tj Mayfield MD;  Location: [facility] PAIN MGT;  Service: Pain Management;  Laterality: N/A;    EPIDURAL STEROID INJECTION N/A 12/12/2022    Procedure: INJECTION, STEROID, EPIDURAL L5/S1 IL CONTRAST;  Surgeon: Tj Mayfield MD;  Location: [facility] PAIN MGT;  Service: Pain Management;  Laterality: N/A;    EPIDURAL STEROID INJECTION N/A 4/6/2023    Procedure: INJECTION, STEROID, EPIDURAL L5/S1;  Surgeon: Tj Mayfield MD;  Location: [facility] PAIN MGT;  Service: Pain Management;  Laterality: N/A;    FOOT ARTHRODESIS Left 1/11/2023    Procedure: FUSION, FOOT;  Surgeon: Ariella Finnegan MD;  Location: [facility] OR;  Service: Orthopedics;  Laterality: Left;  nimbus    HARVESTING OF BONE GRAFT Left 1/11/2023    Procedure:  SURGICAL PROCUREMENT, BONE GRAFT;  Surgeon: Ariella Finnegan MD;  Location: [facility] OR;  Service: Orthopedics;  Laterality: Left;    INJECTION OF ANESTHETIC AGENT AROUND NERVE Bilateral 8/23/2021    Procedure: BLOCK, NERVE, MEDIAL BRANCH L3,L4,L5;  Surgeon: Tj Mayfield MD;  Location: [facility] PAIN MGT;  Service: Pain Management;  Laterality: Bilateral;  1 of 2    INJECTION OF ANESTHETIC AGENT AROUND NERVE Bilateral 8/26/2021    Procedure: BLOCK, NERVE, MEDIAL BRANCH L3,L4,L5;  Surgeon: Tj Mayfield MD;  Location: [facility] PAIN MGT;  Service: Pain Management;  Laterality: Bilateral;  2 of 2    INJECTION OF ANESTHETIC AGENT AROUND NERVE Left 7/7/2022    Procedure: BLOCK, NERVE, LEFT OBTURATOR AND FEMORAL;  Surgeon: Tj Mayfield MD;  Location: [facility] PAIN MGT;  Service: Pain Management;  Laterality: Left;    INJECTION OF FACET JOINT Bilateral 3/12/2020    Procedure: FACET JOINT INJECTION (LUMBAR BLOCK) BILATERAL L4-5 AND L5-S1 DIRECT REFERRAL;  Surgeon: Tj Mayfield MD;  Location: [facility] PAIN MGT;  Service: Pain Management;  Laterality: Bilateral;  NEEDS CONSENT    INJECTION OF FACET JOINT Bilateral 3/29/2021    Procedure: INJECTION, FACET JOINT L3/4, L4/5, L5/S1;  Surgeon: Tj Mayfield MD;  Location: [facility] PAIN MGT;  Service: Pain Management;  Laterality: Bilateral;    INJECTION OF JOINT Right 7/30/2020    Procedure: INJECTION, JOINT, RIGHT HIP Interarticular under flouro;  Surgeon: Tj Mayfield MD;  Location: [facility] PAIN MGT;  Service: Pain Management;  Laterality: Right;  INJECTION, JOINT, RIGHT HIP Interarticular under flouro    INJECTION OF JOINT Right 2/21/2022    Procedure: Injection, Joint RIGHT ISCHIAL BURSA;  Surgeon: Tj Mayfield MD;  Location: [facility] PAIN MGT;  Service: Pain Management;  Laterality: Right;    INTRAMEDULLARY RODDING OF FEMUR Left 5/21/2019    Procedure: INSERTION, INTRAMEDULLARY ARIEL, FEMUR;  Surgeon: López Duff MD;  Location: [facility] OR 2ND FLR;  Service: Orthopedics;  Laterality: Left;    LENGTHENING OF  TENDON OF LOWER EXTREMITY Left 1/11/2023    Procedure: LENGTHENING, TENDON, LOWER EXTREMITY;  Surgeon: Ariella Finnegan MD;  Location: [facility] OR;  Service: Orthopedics;  Laterality: Left;    MAGNETIC RESONANCE IMAGING N/A 5/29/2023    Procedure: MRI (Magnetic Resonance Imagine);  Surgeon: Sujey Surgeon;  Location: [facility];  Service: Anesthesiology;  Laterality: N/A;    RADIOFREQUENCY ABLATION Left 9/20/2021    Procedure: RADIOFREQUENCY ABLATION left L3,4,5 RFA  1 of 2  CONSENT NEEDED;  Surgeon: Tj Mayfield MD;  Location: [facility] PAIN MGT;  Service: Pain Management;  Laterality: Left;    RADIOFREQUENCY ABLATION Right 10/4/2021    Procedure: RADIOFREQUENCY ABLATION  right L3,4,5 RFA   2 of 2  CONSENT NEEDED;  Surgeon: Tj Mayfield MD;  Location: [facility] PAIN MGT;  Service: Pain Management;  Laterality: Right;    RADIOFREQUENCY ABLATION Left 4/21/2022    Procedure: Radiofrequency Ablation LEFT L3,L4,L5;  Surgeon: Tj Mayfield MD;  Location: [facility] PAIN MGT;  Service: Pain Management;  Laterality: Left;    RADIOFREQUENCY ABLATION Right 5/12/2022    Procedure: Radiofrequency Ablation RIGHT L3,L4,L5;  Surgeon: Tj Mayfield MD;  Location: [facility] PAIN MGT;  Service: Pain Management;  Laterality: Right;    RADIOFREQUENCY ABLATION Left 7/25/2022    Procedure: Radiofrequency Ablation Left Femoral & Obturator;  Surgeon: Tj Mayfield MD;  Location: [facility] PAIN MGT;  Service: Pain Management;  Laterality: Left;    REPAIR OF TRICEPS TENDON Left 10/17/2018    Procedure: REPAIR, TENDON, TRICEPS left elbow;  Surgeon: Staci Yarbrough MD;  [address];  Service: Orthopedics;  Laterality: Left;  Anesthesia: General and regional. PRONE, k-wire , hand pan 1 and pan 2, CALL ARTHREX/Tamera notified 10-12 LO    TONSILLECTOMY      TRANSFORAMINAL EPIDURAL INJECTION OF STEROID Right 8/31/2020    Procedure: INJECTION, STEROID, EPIDURAL, TRANSFORAMINAL,  APPROACH, L3-L4 and L4-L5 need consent;  Surgeon: Tj Mayfield MD;  Location:  [facility] PAIN MGT;  Service: Pain Management;  Laterality: Right;    TRANSFORAMINAL EPIDURAL INJECTION OF STEROID Bilateral 7/23/2021    Procedure: INJECTION, STEROID, EPIDURAL, TRANSFORAMINAL APPROACH L4/5;  Surgeon: Tj Mayifeld MD;  Location: [facility] PAIN MGT;  Service: Pain Management;  Laterality: Bilateral;    TRIGGER POINT INJECTION N/A 7/23/2021    Procedure: INJECTION, TRIGGER POINT SCAPULAR;  Surgeon: Tj Mayfield MD;  Location: [facility] PAIN MGT;  Service: Pain Management;  Laterality: N/A;    TUBAL LIGATION  2003    UPPER GASTROINTESTINAL ENDOSCOPY      10/11    uterine ablation  2003       Medications:   No current facility-administered medications on file prior to encounter.     Current Outpatient Medications on File Prior to Encounter   Medication Sig Dispense Refill    albuterol (PROVENTIL/VENTOLIN HFA) 90 mcg/actuation inhaler Inhale 2 puffs into the lungs every 6 (six) hours as needed. Rescue 20.1 g 11    albuterol-ipratropium (DUO-NEB) 2.5 mg-0.5 mg/3 mL nebulizer solution Take 3 mLs by nebulization every 6 (six) hours as needed for Wheezing. Rescue 90 mL 3    aspirin 325 MG tablet Take 1 tablet (325 mg total) by mouth once daily. (Patient not taking: Reported on 5/26/2023) 30 tablet 0    budesonide-formoterol 160-4.5 mcg (SYMBICORT) 160-4.5 mcg/actuation HFAA Inhale 2 puffs into the lungs every 12 (twelve) hours. 30.6 g 3    calcium citrate-vitamin D3 315-200 mg (CITRACAL+D) 315 mg-5 mcg (200 unit) per tablet Take 1 tablet by mouth 2 (two) times daily.       diclofenac sodium (VOLTAREN) 1 % Gel APPLY 2 GRAMS TOPICALLY 4 (FOUR) TIMES DAILY AS NEEDED. 300 g 2    fluconazole (DIFLUCAN) 150 MG Tab Take 150 mg by mouth as needed.      furosemide (LASIX) 20 MG tablet Take 1 tablet (20 mg total) by mouth once daily. (Patient not taking: Reported on 6/13/2023) 5 tablet 0    halobetasol propion-tazarotene (DUOBRII) 0.01-0.045 % Lotn aaa on feet and hands qhs  then vaseline and warm towel (Patient not taking:  Reported on 6/13/2023) 100 g 3    INV PROPULSID 10 MG Take 2 tablets (20 mg) by mouth 4 (four) times daily. FOR INVESTIGATIONAL USE ONLY. Protocol [id number]  Subject ID: [id number]. 1440 each 0    ketoconazole (NIZORAL) 2 % cream Apply to feet twice daily for 3 weeks (Patient not taking: Reported on 6/13/2023) 60 g 3    lifitegrast (XIIDRA) 5 % Dpet INSTILL ONE DROP INTO BOTH EYES TWICE A DAY 60 mL 10    magic mouthwash diphen/antac/lidoc Swish and Spit 5 mL by mouth for 30 seconds twice daily. (Patient not taking: Reported on 6/13/2023) 150 mL 0    methocarbamoL (ROBAXIN) 750 MG Tab Take 1 tablet (750 mg total) by mouth 3 (three) times daily. 90 tablet 0    naproxen (NAPROSYN) 500 MG tablet Take 1 tablet (500 mg total) by mouth 2 (two) times daily as needed (prn). 180 tablet 1    omeprazole (PRILOSEC) 40 MG capsule Take 1 capsule (40 mg total) by mouth every morning. 30 capsule 6    potassium chloride SA (K-DUR,KLOR-CON) 20 MEQ tablet Take 1 tablet (20 mEq total) by mouth once daily. Take with lasix 5 tablet 0    POTASSIUM ORAL Take by mouth once daily.      predniSONE (DELTASONE) 1 MG tablet TAKE 5 TABLETS (5 MG TOTAL) BY MOUTH ONCE DAILY. (Patient taking differently: Take 5 mg by mouth once daily. Pt takes a total of 6 mg every morning.) 450 tablet 1    predniSONE (DELTASONE) 5 MG tablet Take 1 tablet (5 mg total) by mouth once daily. 90 tablet 1    pregabalin (LYRICA) 100 MG capsule Take 1 capsule (100 mg total) by mouth 3 (three) times daily. 90 capsule 2    PREMARIN vaginal cream PLACE 0.5 GRAM VAGINALLY 3 (THREE) TIMES A WEEK. 30 g 1    promethazine (PHENERGAN) 25 MG tablet Take 1 tablet (25 mg total) by mouth every 6 (six) hours as needed for Nausea. 15 tablet 0    rosuvastatin (CRESTOR) 20 MG tablet Take 1 tablet (20 mg total) by mouth every evening. 90 tablet 3    sucralfate (CARAFATE) 1 gram tablet TAKE 1 TABLET (1 G TOTAL) BY MOUTH 4 (FOUR) TIMES DAILY. 360 tablet 2    sucralfate (CARAFATE) 1 gram tablet  Take 1 tablet (1 g total) by mouth 4 (four) times daily. 360 tablet 3       Social History:   Social History     Socioeconomic History    Marital status:    Tobacco Use    Smoking status: Never    Smokeless tobacco: Never   Substance and Sexual Activity    Alcohol use: Yes     Comment: 2-3 times per year.    Drug use: No    Sexual activity: Yes     Partners: Male     Birth control/protection: Surgical     Social Determinants of Health     Financial Resource Strain: Unknown    Difficulty of Paying Living Expenses: Patient refused   Food Insecurity: Unknown    Worried About Running Out of Food in the Last Year: Patient refused    Ran Out of Food in the Last Year: Patient refused   Transportation Needs: Unknown    Lack of Transportation (Medical): Patient refused    Lack of Transportation (Non-Medical): Patient refused   Physical Activity: Unknown    Days of Exercise per Week: Patient refused   Stress: No Stress Concern Present    Feeling of Stress : Not at all   Social Connections: Unknown    Frequency of Communication with Friends and Family: More than three times a week    Frequency of Social Gatherings with Friends and Family: Twice a week    Active Member of Clubs or Organizations: Patient refused    Attends Club or Organization Meetings: More than 4 times per year    Marital Status:    Housing Stability: Unknown    Unable to Pay for Housing in the Last Year: Patient refused    Unstable Housing in the Last Year: Patient refused       REVIEW OF SYSTEMS:  Constitution: Negative. Negative for chills, fever and night sweats.   Cardiovascular: Negative for chest pain and syncope.   Respiratory: Negative for cough and shortness of breath.   Gastrointestinal: See HPI. Negative for nausea/vomiting. Negative for abdominal pain.  Genitourinary: See HPI. Negative for discoloration or dysuria.  Skin: Negative for dry skin, itching and rash.   Hematologic/Lymphatic: Negative for bleeding problem. Does not  bruise/bleed easily.   Musculoskeletal: Negative for falls and muscle weakness.   Neurological: See HPI. No seizures.   Endocrine: Negative for polydipsia, polyphagia and polyuria.   Allergic/Immunologic: Negative for hives and persistent infections.     EXAM:  LMP  (LMP Unknown)   Breastfeeding No     General: The patient is a very pleasant 62 y.o. female in no apparent distress, the patient is oriented to person, place and time.  Psych: Normal mood and affect  HEENT: Vision grossly intact, hearing intact to the spoken word.  Lungs: Respirations unlabored.  Gait: Antalgic station and gait, no difficulty with toe or heel walk.   Skin: Dorsal lumbar skin negative for rashes, lesions, hairy patches and surgical scars. There is mild lumbar tenderness to palpation.  Range of motion: Lumbar range of motion is acceptable.  Spinal Balance: Global saggital and coronal spinal balance acceptable, not significant for scoliosis and kyphosis.  Musculoskeletal: No pain with the range of motion of the bilateral hips. No trochanteric tenderness to palpation.  Vascular: Bilateral lower extremities warm and well perfused, dorsalis pedis pulses 2+ bilaterally.  Neurological: Normal strength and tone in all major motor groups in the bilateral lower extremities. Normal sensation to light touch in the L2-S1 dermatomes bilaterally.  Deep tendon reflexes symmetric 2+ in the bilateral lower extremities.  Negative Babinski bilaterally. Straight leg raise negative bilaterally.    IMAGING:      Today I personally reviewed AP, Lat and Flex/Ex  upright L-spine films that demonstrate severe degenerative changes at L4-5 L5-S1 with grade I anterolisthesis of L4 on L5.     MRI lumbar (2020) demonstrates multilevel degenerative changes of the lumbar spine, most significant at the level of L4-L5 where there is anterolisthesis and a disc extrusion contributing to severe spinal canal stenosis and moderate left and mild right neural foraminal  narrowing.     There is no height or weight on file to calculate BMI.    Hemoglobin A1C   Date Value Ref Range Status   03/15/2022 5.2 4.0 - 5.6 % Final     Comment:     ADA Screening Guidelines:  5.7-6.4%  Consistent with prediabetes  >or=6.5%  Consistent with diabetes    High levels of fetal hemoglobin interfere with the HbA1C  assay. Heterozygous hemoglobin variants (HbS, HgC, etc)do  not significantly interfere with this assay.   However, presence of multiple variants may affect accuracy.     02/06/2017 6.0 4.5 - 6.2 % Final     Comment:     According to ADA guidelines, hemoglobin A1C <7.0% represents  optimal control in non-pregnant diabetic patients.  Different  metrics may apply to specific populations.   Standards of Medical Care in Diabetes - 2016.  For the purpose of screening for the presence of diabetes:  <5.7%     Consistent with the absence of diabetes  5.7-6.4%  Consistent with increasing risk for diabetes   (prediabetes)  >or=6.5%  Consistent with diabetes  Currently no consensus exists for use of hemoglobin A1C  for diagnosis of diabetes for children.     09/28/2015 5.6 4.5 - 6.2 % Final           ASSESSMENT/PLAN:    Plan for L4/5 TLIF

## 2023-06-22 ENCOUNTER — ANESTHESIA (OUTPATIENT)
Dept: SURGERY | Facility: HOSPITAL | Age: 63
DRG: 454 | End: 2023-06-22
Payer: MEDICARE

## 2023-06-22 ENCOUNTER — HOSPITAL ENCOUNTER (INPATIENT)
Facility: HOSPITAL | Age: 63
LOS: 6 days | Discharge: HOME HEALTH | DRG: 454 | End: 2023-06-28
Attending: ORTHOPAEDIC SURGERY | Admitting: ORTHOPAEDIC SURGERY
Payer: MEDICARE

## 2023-06-22 DIAGNOSIS — R60.9 EDEMA: ICD-10-CM

## 2023-06-22 DIAGNOSIS — E83.51 HYPOCALCEMIA: ICD-10-CM

## 2023-06-22 DIAGNOSIS — M54.16 LUMBAR RADICULOPATHY: Primary | ICD-10-CM

## 2023-06-22 LAB
ALBUMIN SERPL BCP-MCNC: 2.8 G/DL (ref 3.5–5.2)
ALP SERPL-CCNC: 108 U/L (ref 55–135)
ALT SERPL W/O P-5'-P-CCNC: 65 U/L (ref 10–44)
ANION GAP SERPL CALC-SCNC: 8 MMOL/L (ref 8–16)
AST SERPL-CCNC: 88 U/L (ref 10–40)
BILIRUB SERPL-MCNC: 0.2 MG/DL (ref 0.1–1)
BUN SERPL-MCNC: 6 MG/DL (ref 8–23)
CA-I BLDV-SCNC: 0.91 MMOL/L (ref 1.06–1.42)
CALCIUM SERPL-MCNC: 6.7 MG/DL (ref 8.7–10.5)
CHLORIDE SERPL-SCNC: 114 MMOL/L (ref 95–110)
CO2 SERPL-SCNC: 20 MMOL/L (ref 23–29)
CREAT SERPL-MCNC: 0.5 MG/DL (ref 0.5–1.4)
EST. GFR  (NO RACE VARIABLE): >60 ML/MIN/1.73 M^2
GLUCOSE SERPL-MCNC: 104 MG/DL (ref 70–110)
POTASSIUM SERPL-SCNC: 3.2 MMOL/L (ref 3.5–5.1)
PROT SERPL-MCNC: 4.7 G/DL (ref 6–8.4)
SODIUM SERPL-SCNC: 142 MMOL/L (ref 136–145)

## 2023-06-22 PROCEDURE — 25000003 PHARM REV CODE 250: Performed by: NURSE ANESTHETIST, CERTIFIED REGISTERED

## 2023-06-22 PROCEDURE — 37000008 HC ANESTHESIA 1ST 15 MINUTES: Performed by: ORTHOPAEDIC SURGERY

## 2023-06-22 PROCEDURE — D9220A PRA ANESTHESIA: Mod: ANES,,, | Performed by: ANESTHESIOLOGY

## 2023-06-22 PROCEDURE — 36415 COLL VENOUS BLD VENIPUNCTURE: CPT | Performed by: PHYSICIAN ASSISTANT

## 2023-06-22 PROCEDURE — 63052 PR LAMINECT/FACETECT/FORAMINOT, ARTHRODESIS, 1 VERTEBR SEGM: ICD-10-PCS | Mod: ,,, | Performed by: ORTHOPAEDIC SURGERY

## 2023-06-22 PROCEDURE — 22853 PR INSERT BIOMECH DEV W/INTERBODY ARTHRODESIS, EA CONTIGUOUS DEFECT: ICD-10-PCS | Mod: AS,,, | Performed by: PHYSICIAN ASSISTANT

## 2023-06-22 PROCEDURE — 22853 INSJ BIOMECHANICAL DEVICE: CPT | Mod: ,,, | Performed by: ORTHOPAEDIC SURGERY

## 2023-06-22 PROCEDURE — 20936 SP BONE AGRFT LOCAL ADD-ON: CPT | Mod: ,,, | Performed by: ORTHOPAEDIC SURGERY

## 2023-06-22 PROCEDURE — 63052 PR LAMINECT/FACETECT/FORAMINOT, ARTHRODESIS, 1 VERTEBR SEGM: ICD-10-PCS | Mod: AS,,, | Performed by: PHYSICIAN ASSISTANT

## 2023-06-22 PROCEDURE — 22840 PR POSTERIOR NON-SEGMENTAL INSTRUMENTATION: ICD-10-PCS | Mod: ,,, | Performed by: ORTHOPAEDIC SURGERY

## 2023-06-22 PROCEDURE — 25000003 PHARM REV CODE 250

## 2023-06-22 PROCEDURE — 71000015 HC POSTOP RECOV 1ST HR: Performed by: ORTHOPAEDIC SURGERY

## 2023-06-22 PROCEDURE — 22840 PR POSTERIOR NON-SEGMENTAL INSTRUMENTATION: ICD-10-PCS | Mod: AS,,, | Performed by: PHYSICIAN ASSISTANT

## 2023-06-22 PROCEDURE — 22633 PR ARTHRODESIS, COMBINED TECHN, SNGL INTERSPACE, LUMBAR: ICD-10-PCS | Mod: AS,,, | Performed by: PHYSICIAN ASSISTANT

## 2023-06-22 PROCEDURE — D9220A PRA ANESTHESIA: Mod: CRNA,,, | Performed by: NURSE ANESTHETIST, CERTIFIED REGISTERED

## 2023-06-22 PROCEDURE — 22633 ARTHRD CMBN 1NTRSPC LUMBAR: CPT | Mod: ,,, | Performed by: ORTHOPAEDIC SURGERY

## 2023-06-22 PROCEDURE — D9220A PRA ANESTHESIA: ICD-10-PCS | Mod: ANES,,, | Performed by: ANESTHESIOLOGY

## 2023-06-22 PROCEDURE — 80053 COMPREHEN METABOLIC PANEL: CPT | Performed by: PHYSICIAN ASSISTANT

## 2023-06-22 PROCEDURE — 93005 ELECTROCARDIOGRAM TRACING: CPT

## 2023-06-22 PROCEDURE — C1713 ANCHOR/SCREW BN/BN,TIS/BN: HCPCS | Performed by: ORTHOPAEDIC SURGERY

## 2023-06-22 PROCEDURE — 22840 INSERT SPINE FIXATION DEVICE: CPT | Mod: ,,, | Performed by: ORTHOPAEDIC SURGERY

## 2023-06-22 PROCEDURE — 27201423 OPTIME MED/SURG SUP & DEVICES STERILE SUPPLY: Performed by: ORTHOPAEDIC SURGERY

## 2023-06-22 PROCEDURE — 25000003 PHARM REV CODE 250: Performed by: ORTHOPAEDIC SURGERY

## 2023-06-22 PROCEDURE — 71000033 HC RECOVERY, INTIAL HOUR: Performed by: ORTHOPAEDIC SURGERY

## 2023-06-22 PROCEDURE — 36000711: Performed by: ORTHOPAEDIC SURGERY

## 2023-06-22 PROCEDURE — 63600175 PHARM REV CODE 636 W HCPCS: Performed by: ANESTHESIOLOGY

## 2023-06-22 PROCEDURE — 82330 ASSAY OF CALCIUM: CPT

## 2023-06-22 PROCEDURE — 20936 PR AUTOGRAFT SPINE SURGERY LOCAL FROM SAME INCISION: ICD-10-PCS | Mod: ,,, | Performed by: ORTHOPAEDIC SURGERY

## 2023-06-22 PROCEDURE — 93010 ELECTROCARDIOGRAM REPORT: CPT | Mod: ,,, | Performed by: INTERNAL MEDICINE

## 2023-06-22 PROCEDURE — 22853 INSJ BIOMECHANICAL DEVICE: CPT | Mod: AS,,, | Performed by: PHYSICIAN ASSISTANT

## 2023-06-22 PROCEDURE — 63600175 PHARM REV CODE 636 W HCPCS: Performed by: PHYSICIAN ASSISTANT

## 2023-06-22 PROCEDURE — 25000003 PHARM REV CODE 250: Performed by: PHYSICIAN ASSISTANT

## 2023-06-22 PROCEDURE — 22633 PR ARTHRODESIS, COMBINED TECHN, SNGL INTERSPACE, LUMBAR: ICD-10-PCS | Mod: ,,, | Performed by: ORTHOPAEDIC SURGERY

## 2023-06-22 PROCEDURE — 63600175 PHARM REV CODE 636 W HCPCS: Performed by: NURSE ANESTHETIST, CERTIFIED REGISTERED

## 2023-06-22 PROCEDURE — C1729 CATH, DRAINAGE: HCPCS | Performed by: ORTHOPAEDIC SURGERY

## 2023-06-22 PROCEDURE — 93010 EKG 12-LEAD: ICD-10-PCS | Mod: ,,, | Performed by: INTERNAL MEDICINE

## 2023-06-22 PROCEDURE — 71000016 HC POSTOP RECOV ADDL HR: Performed by: ORTHOPAEDIC SURGERY

## 2023-06-22 PROCEDURE — 37000009 HC ANESTHESIA EA ADD 15 MINS: Performed by: ORTHOPAEDIC SURGERY

## 2023-06-22 PROCEDURE — 63052 LAM FACETC/FRMT ARTHRD LUM 1: CPT | Mod: ,,, | Performed by: ORTHOPAEDIC SURGERY

## 2023-06-22 PROCEDURE — 22853 PR INSERT BIOMECH DEV W/INTERBODY ARTHRODESIS, EA CONTIGUOUS DEFECT: ICD-10-PCS | Mod: ,,, | Performed by: ORTHOPAEDIC SURGERY

## 2023-06-22 PROCEDURE — D9220A PRA ANESTHESIA: ICD-10-PCS | Mod: CRNA,,, | Performed by: NURSE ANESTHETIST, CERTIFIED REGISTERED

## 2023-06-22 PROCEDURE — 36000710: Performed by: ORTHOPAEDIC SURGERY

## 2023-06-22 PROCEDURE — 22633 ARTHRD CMBN 1NTRSPC LUMBAR: CPT | Mod: AS,,, | Performed by: PHYSICIAN ASSISTANT

## 2023-06-22 PROCEDURE — 36415 COLL VENOUS BLD VENIPUNCTURE: CPT

## 2023-06-22 PROCEDURE — 63600175 PHARM REV CODE 636 W HCPCS: Performed by: ORTHOPAEDIC SURGERY

## 2023-06-22 PROCEDURE — 63052 LAM FACETC/FRMT ARTHRD LUM 1: CPT | Mod: AS,,, | Performed by: PHYSICIAN ASSISTANT

## 2023-06-22 PROCEDURE — 22840 INSERT SPINE FIXATION DEVICE: CPT | Mod: AS,,, | Performed by: PHYSICIAN ASSISTANT

## 2023-06-22 PROCEDURE — 11000001 HC ACUTE MED/SURG PRIVATE ROOM

## 2023-06-22 DEVICE — SCREW SPINE BONE LOCK 5.5X6MM: Type: IMPLANTABLE DEVICE | Site: SPINE LUMBAR | Status: FUNCTIONAL

## 2023-06-22 DEVICE — ROD CAPSURE CURVED 5.5X40MM: Type: IMPLANTABLE DEVICE | Site: SPINE LUMBAR | Status: FUNCTIONAL

## 2023-06-22 DEVICE — ROD CAPSURE CURVED 5.5X35MM: Type: IMPLANTABLE DEVICE | Site: SPINE LUMBAR | Status: FUNCTIONAL

## 2023-06-22 DEVICE — SCREW SALVO YOKE SPINE 5.5X6MM: Type: IMPLANTABLE DEVICE | Site: SPINE LUMBAR | Status: FUNCTIONAL

## 2023-06-22 DEVICE — SCREW SALVO 5.5X40MM: Type: IMPLANTABLE DEVICE | Site: SPINE LUMBAR | Status: FUNCTIONAL

## 2023-06-22 DEVICE — IMPLANTABLE DEVICE: Type: IMPLANTABLE DEVICE | Site: SPINE LUMBAR | Status: FUNCTIONAL

## 2023-06-22 DEVICE — SCREW SALVO 5.5X45MM: Type: IMPLANTABLE DEVICE | Site: SPINE LUMBAR | Status: FUNCTIONAL

## 2023-06-22 RX ORDER — HYDROMORPHONE HYDROCHLORIDE 2 MG/1
4 TABLET ORAL
Status: DISCONTINUED | OUTPATIENT
Start: 2023-06-22 | End: 2023-06-28 | Stop reason: HOSPADM

## 2023-06-22 RX ORDER — LIDOCAINE HYDROCHLORIDE AND EPINEPHRINE 10; 10 MG/ML; UG/ML
INJECTION, SOLUTION INFILTRATION; PERINEURAL
Status: DISCONTINUED | OUTPATIENT
Start: 2023-06-22 | End: 2023-06-22 | Stop reason: HOSPADM

## 2023-06-22 RX ORDER — MAGNESIUM SULFATE HEPTAHYDRATE 40 MG/ML
INJECTION, SOLUTION INTRAVENOUS
Status: DISCONTINUED | OUTPATIENT
Start: 2023-06-22 | End: 2023-06-22

## 2023-06-22 RX ORDER — HYDROMORPHONE HYDROCHLORIDE 2 MG/1
2 TABLET ORAL EVERY 8 HOURS PRN
Status: DISCONTINUED | OUTPATIENT
Start: 2023-06-22 | End: 2023-06-22

## 2023-06-22 RX ORDER — CEFAZOLIN SODIUM 1 G/3ML
INJECTION, POWDER, FOR SOLUTION INTRAMUSCULAR; INTRAVENOUS
Status: DISCONTINUED | OUTPATIENT
Start: 2023-06-22 | End: 2023-06-22

## 2023-06-22 RX ORDER — KETAMINE HCL IN 0.9 % NACL 50 MG/5 ML
SYRINGE (ML) INTRAVENOUS
Status: DISCONTINUED | OUTPATIENT
Start: 2023-06-22 | End: 2023-06-22

## 2023-06-22 RX ORDER — HYDROMORPHONE HYDROCHLORIDE 1 MG/ML
INJECTION, SOLUTION INTRAMUSCULAR; INTRAVENOUS; SUBCUTANEOUS
Status: DISCONTINUED | OUTPATIENT
Start: 2023-06-22 | End: 2023-06-22

## 2023-06-22 RX ORDER — AMOXICILLIN 250 MG
1 CAPSULE ORAL 2 TIMES DAILY
Status: DISCONTINUED | OUTPATIENT
Start: 2023-06-22 | End: 2023-06-28 | Stop reason: HOSPADM

## 2023-06-22 RX ORDER — HEPARIN SODIUM 5000 [USP'U]/ML
5000 INJECTION, SOLUTION INTRAVENOUS; SUBCUTANEOUS EVERY 8 HOURS
Status: DISCONTINUED | OUTPATIENT
Start: 2023-06-23 | End: 2023-06-28 | Stop reason: HOSPADM

## 2023-06-22 RX ORDER — PHENYLEPHRINE HCL IN 0.9% NACL 1 MG/10 ML
SYRINGE (ML) INTRAVENOUS
Status: DISCONTINUED | OUTPATIENT
Start: 2023-06-22 | End: 2023-06-22

## 2023-06-22 RX ORDER — HYDROMORPHONE HYDROCHLORIDE 2 MG/1
2 TABLET ORAL
Status: DISCONTINUED | OUTPATIENT
Start: 2023-06-22 | End: 2023-06-28 | Stop reason: HOSPADM

## 2023-06-22 RX ORDER — CALCIUM GLUCONATE 20 MG/ML
1 INJECTION, SOLUTION INTRAVENOUS ONCE
Status: COMPLETED | OUTPATIENT
Start: 2023-06-22 | End: 2023-06-23

## 2023-06-22 RX ORDER — ACETAMINOPHEN 10 MG/ML
INJECTION, SOLUTION INTRAVENOUS
Status: DISCONTINUED | OUTPATIENT
Start: 2023-06-22 | End: 2023-06-22

## 2023-06-22 RX ORDER — ATORVASTATIN CALCIUM 40 MG/1
80 TABLET, FILM COATED ORAL DAILY
Status: DISCONTINUED | OUTPATIENT
Start: 2023-06-23 | End: 2023-06-28 | Stop reason: HOSPADM

## 2023-06-22 RX ORDER — SODIUM CHLORIDE 9 MG/ML
INJECTION, SOLUTION INTRAVENOUS CONTINUOUS
Status: DISCONTINUED | OUTPATIENT
Start: 2023-06-22 | End: 2023-06-23

## 2023-06-22 RX ORDER — ACETAMINOPHEN 325 MG/1
650 TABLET ORAL EVERY 6 HOURS PRN
Status: DISCONTINUED | OUTPATIENT
Start: 2023-06-22 | End: 2023-06-28 | Stop reason: HOSPADM

## 2023-06-22 RX ORDER — OXYCODONE HYDROCHLORIDE 10 MG/1
10 TABLET ORAL EVERY 4 HOURS PRN
Status: DISCONTINUED | OUTPATIENT
Start: 2023-06-22 | End: 2023-06-28 | Stop reason: HOSPADM

## 2023-06-22 RX ORDER — DIPHENHYDRAMINE HYDROCHLORIDE 50 MG/ML
INJECTION INTRAMUSCULAR; INTRAVENOUS
Status: DISCONTINUED | OUTPATIENT
Start: 2023-06-22 | End: 2023-06-22

## 2023-06-22 RX ORDER — OXYCODONE HYDROCHLORIDE 5 MG/1
5 TABLET ORAL EVERY 4 HOURS PRN
Status: DISCONTINUED | OUTPATIENT
Start: 2023-06-22 | End: 2023-06-28 | Stop reason: HOSPADM

## 2023-06-22 RX ORDER — PREGABALIN 50 MG/1
100 CAPSULE ORAL 3 TIMES DAILY
Status: DISCONTINUED | OUTPATIENT
Start: 2023-06-22 | End: 2023-06-28 | Stop reason: HOSPADM

## 2023-06-22 RX ORDER — PROPOFOL 10 MG/ML
VIAL (ML) INTRAVENOUS CONTINUOUS PRN
Status: DISCONTINUED | OUTPATIENT
Start: 2023-06-22 | End: 2023-06-22

## 2023-06-22 RX ORDER — ROCURONIUM BROMIDE 10 MG/ML
INJECTION, SOLUTION INTRAVENOUS
Status: DISCONTINUED | OUTPATIENT
Start: 2023-06-22 | End: 2023-06-22

## 2023-06-22 RX ORDER — PROMETHAZINE HYDROCHLORIDE 12.5 MG/1
25 TABLET ORAL EVERY 6 HOURS PRN
Status: DISCONTINUED | OUTPATIENT
Start: 2023-06-22 | End: 2023-06-28 | Stop reason: HOSPADM

## 2023-06-22 RX ORDER — SODIUM CHLORIDE 0.9 % (FLUSH) 0.9 %
5 SYRINGE (ML) INJECTION
Status: DISCONTINUED | OUTPATIENT
Start: 2023-06-22 | End: 2023-06-28 | Stop reason: HOSPADM

## 2023-06-22 RX ORDER — VANCOMYCIN HYDROCHLORIDE 1 G/20ML
INJECTION, POWDER, LYOPHILIZED, FOR SOLUTION INTRAVENOUS
Status: DISCONTINUED | OUTPATIENT
Start: 2023-06-22 | End: 2023-06-22 | Stop reason: HOSPADM

## 2023-06-22 RX ORDER — METHOCARBAMOL 750 MG/1
750 TABLET, FILM COATED ORAL 4 TIMES DAILY
Status: DISCONTINUED | OUTPATIENT
Start: 2023-06-22 | End: 2023-06-27

## 2023-06-22 RX ORDER — KETAMINE HYDROCHLORIDE 100 MG/ML
INJECTION, SOLUTION INTRAMUSCULAR; INTRAVENOUS
Status: DISCONTINUED | OUTPATIENT
Start: 2023-06-22 | End: 2023-06-22

## 2023-06-22 RX ORDER — OXYBUTYNIN CHLORIDE 5 MG/1
5 TABLET, EXTENDED RELEASE ORAL DAILY
Status: DISCONTINUED | OUTPATIENT
Start: 2023-06-23 | End: 2023-06-28 | Stop reason: HOSPADM

## 2023-06-22 RX ORDER — LEFLUNOMIDE 20 MG/1
20 TABLET ORAL DAILY
Status: DISCONTINUED | OUTPATIENT
Start: 2023-06-23 | End: 2023-06-28 | Stop reason: HOSPADM

## 2023-06-22 RX ORDER — DEXMEDETOMIDINE HYDROCHLORIDE 100 UG/ML
INJECTION, SOLUTION INTRAVENOUS
Status: DISCONTINUED | OUTPATIENT
Start: 2023-06-22 | End: 2023-06-22

## 2023-06-22 RX ORDER — PROPOFOL 10 MG/ML
VIAL (ML) INTRAVENOUS
Status: DISCONTINUED | OUTPATIENT
Start: 2023-06-22 | End: 2023-06-22

## 2023-06-22 RX ORDER — POLYETHYLENE GLYCOL 3350 17 G/17G
17 POWDER, FOR SOLUTION ORAL DAILY
Status: DISCONTINUED | OUTPATIENT
Start: 2023-06-23 | End: 2023-06-28 | Stop reason: HOSPADM

## 2023-06-22 RX ORDER — FUROSEMIDE 20 MG/1
20 TABLET ORAL DAILY
Status: DISCONTINUED | OUTPATIENT
Start: 2023-06-23 | End: 2023-06-28 | Stop reason: HOSPADM

## 2023-06-22 RX ORDER — MIDAZOLAM HYDROCHLORIDE 1 MG/ML
INJECTION, SOLUTION INTRAMUSCULAR; INTRAVENOUS
Status: DISCONTINUED | OUTPATIENT
Start: 2023-06-22 | End: 2023-06-22

## 2023-06-22 RX ORDER — LIDOCAINE HYDROCHLORIDE 20 MG/ML
INJECTION, SOLUTION EPIDURAL; INFILTRATION; INTRACAUDAL; PERINEURAL
Status: DISCONTINUED | OUTPATIENT
Start: 2023-06-22 | End: 2023-06-22

## 2023-06-22 RX ORDER — BUPIVACAINE HYDROCHLORIDE AND EPINEPHRINE 5; 5 MG/ML; UG/ML
INJECTION, SOLUTION EPIDURAL; INTRACAUDAL; PERINEURAL
Status: DISCONTINUED | OUTPATIENT
Start: 2023-06-22 | End: 2023-06-22 | Stop reason: HOSPADM

## 2023-06-22 RX ORDER — HALOPERIDOL 5 MG/ML
0.5 INJECTION INTRAMUSCULAR EVERY 10 MIN PRN
Status: DISCONTINUED | OUTPATIENT
Start: 2023-06-22 | End: 2023-06-22 | Stop reason: HOSPADM

## 2023-06-22 RX ORDER — METHOCARBAMOL 750 MG/1
750 TABLET, FILM COATED ORAL 3 TIMES DAILY
Status: DISCONTINUED | OUTPATIENT
Start: 2023-06-22 | End: 2023-06-22

## 2023-06-22 RX ORDER — FAMOTIDINE 20 MG/1
20 TABLET, FILM COATED ORAL 2 TIMES DAILY
Status: DISCONTINUED | OUTPATIENT
Start: 2023-06-22 | End: 2023-06-28 | Stop reason: HOSPADM

## 2023-06-22 RX ORDER — FENTANYL CITRATE 50 UG/ML
INJECTION, SOLUTION INTRAMUSCULAR; INTRAVENOUS
Status: DISCONTINUED | OUTPATIENT
Start: 2023-06-22 | End: 2023-06-22

## 2023-06-22 RX ORDER — HYDROMORPHONE HYDROCHLORIDE 1 MG/ML
0.2 INJECTION, SOLUTION INTRAMUSCULAR; INTRAVENOUS; SUBCUTANEOUS EVERY 5 MIN PRN
Status: DISCONTINUED | OUTPATIENT
Start: 2023-06-22 | End: 2023-06-22 | Stop reason: HOSPADM

## 2023-06-22 RX ORDER — MUPIROCIN 20 MG/G
OINTMENT TOPICAL 2 TIMES DAILY
Status: COMPLETED | OUTPATIENT
Start: 2023-06-22 | End: 2023-06-27

## 2023-06-22 RX ADMIN — HYDROMORPHONE HYDROCHLORIDE 0.2 MG: 1 INJECTION, SOLUTION INTRAMUSCULAR; INTRAVENOUS; SUBCUTANEOUS at 05:06

## 2023-06-22 RX ADMIN — ROCURONIUM BROMIDE 50 MG: 10 INJECTION INTRAVENOUS at 01:06

## 2023-06-22 RX ADMIN — MIDAZOLAM HYDROCHLORIDE 4 MG: 1 INJECTION, SOLUTION INTRAMUSCULAR; INTRAVENOUS at 01:06

## 2023-06-22 RX ADMIN — LIDOCAINE HYDROCHLORIDE 100 MG: 20 INJECTION, SOLUTION EPIDURAL; INFILTRATION; INTRACAUDAL; PERINEURAL at 01:06

## 2023-06-22 RX ADMIN — DEXMEDETOMIDINE HYDROCHLORIDE 4 MCG: 100 INJECTION, SOLUTION INTRAVENOUS at 03:06

## 2023-06-22 RX ADMIN — ROCURONIUM BROMIDE 20 MG: 10 INJECTION INTRAVENOUS at 02:06

## 2023-06-22 RX ADMIN — Medication 15 MG: at 03:06

## 2023-06-22 RX ADMIN — OXYCODONE HYDROCHLORIDE 10 MG: 10 TABLET ORAL at 04:06

## 2023-06-22 RX ADMIN — METHOCARBAMOL 750 MG: 750 TABLET ORAL at 08:06

## 2023-06-22 RX ADMIN — CEFAZOLIN 2 G: 2 INJECTION, POWDER, FOR SOLUTION INTRAMUSCULAR; INTRAVENOUS at 10:06

## 2023-06-22 RX ADMIN — HYDROMORPHONE HYDROCHLORIDE 0.2 MG: 1 INJECTION, SOLUTION INTRAMUSCULAR; INTRAVENOUS; SUBCUTANEOUS at 04:06

## 2023-06-22 RX ADMIN — Medication 20 MG: at 01:06

## 2023-06-22 RX ADMIN — HYDROMORPHONE HYDROCHLORIDE 0.5 MG: 1 INJECTION, SOLUTION INTRAMUSCULAR; INTRAVENOUS; SUBCUTANEOUS at 03:06

## 2023-06-22 RX ADMIN — Medication 200 MCG: at 03:06

## 2023-06-22 RX ADMIN — SODIUM CHLORIDE: 0.9 INJECTION, SOLUTION INTRAVENOUS at 01:06

## 2023-06-22 RX ADMIN — Medication 100 MCG: at 03:06

## 2023-06-22 RX ADMIN — ACETAMINOPHEN 1000 MG: 10 INJECTION INTRAVENOUS at 03:06

## 2023-06-22 RX ADMIN — MAGNESIUM SULFATE 1 G: 2 INJECTION INTRAVENOUS at 02:06

## 2023-06-22 RX ADMIN — SODIUM CHLORIDE: 9 INJECTION, SOLUTION INTRAVENOUS at 04:06

## 2023-06-22 RX ADMIN — CEFAZOLIN 2 G: 330 INJECTION, POWDER, FOR SOLUTION INTRAMUSCULAR; INTRAVENOUS at 02:06

## 2023-06-22 RX ADMIN — PROPOFOL 150 MG: 10 INJECTION, EMULSION INTRAVENOUS at 01:06

## 2023-06-22 RX ADMIN — Medication 15 MG: at 02:06

## 2023-06-22 RX ADMIN — MUPIROCIN: 20 OINTMENT TOPICAL at 08:06

## 2023-06-22 RX ADMIN — DIPHENHYDRAMINE HYDROCHLORIDE 25 MG: 50 INJECTION, SOLUTION INTRAMUSCULAR; INTRAVENOUS at 02:06

## 2023-06-22 RX ADMIN — SENNOSIDES AND DOCUSATE SODIUM 1 TABLET: 50; 8.6 TABLET ORAL at 08:06

## 2023-06-22 RX ADMIN — METHOCARBAMOL 750 MG: 750 TABLET ORAL at 04:06

## 2023-06-22 RX ADMIN — Medication 150 MCG/KG/MIN: at 01:06

## 2023-06-22 RX ADMIN — SUGAMMADEX 200 MG: 100 INJECTION, SOLUTION INTRAVENOUS at 02:06

## 2023-06-22 RX ADMIN — PREGABALIN 100 MG: 50 CAPSULE ORAL at 08:06

## 2023-06-22 RX ADMIN — HYDROCORTISONE SODIUM SUCCINATE 50 MG: 100 INJECTION, POWDER, FOR SOLUTION INTRAMUSCULAR; INTRAVENOUS at 02:06

## 2023-06-22 RX ADMIN — CALCIUM GLUCONATE 1 G: 20 INJECTION, SOLUTION INTRAVENOUS at 11:06

## 2023-06-22 RX ADMIN — HYDROMORPHONE HYDROCHLORIDE 4 MG: 2 TABLET ORAL at 08:06

## 2023-06-22 RX ADMIN — HYDROMORPHONE HYDROCHLORIDE 0.5 MG: 1 INJECTION, SOLUTION INTRAMUSCULAR; INTRAVENOUS; SUBCUTANEOUS at 02:06

## 2023-06-22 RX ADMIN — FENTANYL CITRATE 100 MCG: 50 INJECTION, SOLUTION INTRAMUSCULAR; INTRAVENOUS at 01:06

## 2023-06-22 RX ADMIN — SODIUM CHLORIDE, SODIUM ACETATE ANHYDROUS, SODIUM GLUCONATE, POTASSIUM CHLORIDE, AND MAGNESIUM CHLORIDE: 526; 222; 502; 37; 30 INJECTION, SOLUTION INTRAVENOUS at 04:06

## 2023-06-22 NOTE — ANESTHESIA PREPROCEDURE EVALUATION
06/22/2023  Pre-operative evaluation for Procedure(s) (LRB):  FUSION, SPINE, LUMBAR, TLIF, POSTERIOR APPROACH, USING PEDICLE SCREW L4/5 spinewave SNS:SSEP/EMG (N/A)    Joyce Peterson is a 62 y.o. female     Patient Active Problem List   Diagnosis    Osteoarthritis involving multiple joints on both sides of body    GERD (gastroesophageal reflux disease)    Gastroparesis    Steroid-induced osteoporosis    Thyroid nodule    Hyperlipidemia    Mild intermittent asthma without complication    Rheumatoid arthritis involving multiple sites    Iron deficiency anemia due to chronic blood loss    Sjogren's syndrome    Candidal esophagitis    Coronary artery disease involving native coronary artery of native heart without angina pectoris    Subclinical hyperthyroidism    Fibromyalgia    Dry eyes    Ureterocele    Urge urinary incontinence    Lumbar radiculopathy    Fatty liver    Hormone replacement therapy (HRT)    Chronic pain    Neuropathic pain of left foot    Impaired gait and mobility       Review of patient's allergies indicates:   Allergen Reactions    Actemra [tocilizumab] Other (See Comments)     Severe dizziness    Codeine Nausea And Vomiting and Hives    Gold au 198 Hives and Rash    Hydroxychloroquine Other (See Comments)     Can't remember the reaction      Iodinated contrast media Other (See Comments)     Other reaction(s): BURNING ALL OVER    Iodine Other (See Comments) and Hives     Other reaction(s): BURNING ALL OVER - Iodine dye - Not topical    Ondansetron Nausea And Vomiting    Sulfa (sulfonamide antibiotics) Other (See Comments)     Can't remember the reaction    Zofran [ondansetron hcl (pf)] Nausea And Vomiting     Pt reports that last time she received zofran she started vomiting again    Methotrexate analogues Nausea Only    Pneumococcal vaccine Other (See  "Comments)    Pneumovax 23 [pneumococcal 23-argenis ps vaccine] Other (See Comments)     "sick"    Methotrexate Nausea Only       No current facility-administered medications on file prior to encounter.     Current Outpatient Medications on File Prior to Encounter   Medication Sig Dispense Refill    budesonide-formoterol 160-4.5 mcg (SYMBICORT) 160-4.5 mcg/actuation HFAA Inhale 2 puffs into the lungs every 12 (twelve) hours. 30.6 g 3    INV PROPULSID 10 MG Take 2 tablets (20 mg) by mouth 4 (four) times daily. FOR INVESTIGATIONAL USE ONLY. Protocol [id number]  Subject ID: [id number]. 1440 each 0    lifitegrast (XIIDRA) 5 % Dpet INSTILL ONE DROP INTO BOTH EYES TWICE A DAY 60 mL 10    omeprazole (PRILOSEC) 40 MG capsule Take 1 capsule (40 mg total) by mouth every morning. 30 capsule 6    predniSONE (DELTASONE) 5 MG tablet Take 1 tablet (5 mg total) by mouth once daily. 90 tablet 1    pregabalin (LYRICA) 100 MG capsule Take 1 capsule (100 mg total) by mouth 3 (three) times daily. 90 capsule 2    rosuvastatin (CRESTOR) 20 MG tablet Take 1 tablet (20 mg total) by mouth every evening. 90 tablet 3    sucralfate (CARAFATE) 1 gram tablet TAKE 1 TABLET (1 G TOTAL) BY MOUTH 4 (FOUR) TIMES DAILY. 360 tablet 2    albuterol (PROVENTIL/VENTOLIN HFA) 90 mcg/actuation inhaler Inhale 2 puffs into the lungs every 6 (six) hours as needed. Rescue 20.1 g 11    albuterol-ipratropium (DUO-NEB) 2.5 mg-0.5 mg/3 mL nebulizer solution Take 3 mLs by nebulization every 6 (six) hours as needed for Wheezing. Rescue 90 mL 3    aspirin 325 MG tablet Take 1 tablet (325 mg total) by mouth once daily. (Patient not taking: Reported on 5/26/2023) 30 tablet 0    calcium citrate-vitamin D3 315-200 mg (CITRACAL+D) 315 mg-5 mcg (200 unit) per tablet Take 1 tablet by mouth 2 (two) times daily.       diclofenac sodium (VOLTAREN) 1 % Gel APPLY 2 GRAMS TOPICALLY 4 (FOUR) TIMES DAILY AS NEEDED. 300 g 2    fluconazole (DIFLUCAN) 150 MG Tab Take 150 mg by " mouth as needed.      furosemide (LASIX) 20 MG tablet Take 1 tablet (20 mg total) by mouth once daily. (Patient not taking: Reported on 6/13/2023) 5 tablet 0    halobetasol propion-tazarotene (DUOBRII) 0.01-0.045 % Lotn aaa on feet and hands qhs  then vaseline and warm towel (Patient not taking: Reported on 6/13/2023) 100 g 3    ketoconazole (NIZORAL) 2 % cream Apply to feet twice daily for 3 weeks (Patient not taking: Reported on 6/13/2023) 60 g 3    magic mouthwash diphen/antac/lidoc Swish and Spit 5 mL by mouth for 30 seconds twice daily. (Patient not taking: Reported on 6/13/2023) 150 mL 0    methocarbamoL (ROBAXIN) 750 MG Tab Take 1 tablet (750 mg total) by mouth 3 (three) times daily. 90 tablet 0    naproxen (NAPROSYN) 500 MG tablet Take 1 tablet (500 mg total) by mouth 2 (two) times daily as needed (prn). 180 tablet 1    potassium chloride SA (K-DUR,KLOR-CON) 20 MEQ tablet Take 1 tablet (20 mEq total) by mouth once daily. Take with lasix 5 tablet 0    POTASSIUM ORAL Take by mouth once daily.      predniSONE (DELTASONE) 1 MG tablet TAKE 5 TABLETS (5 MG TOTAL) BY MOUTH ONCE DAILY. (Patient taking differently: Take 5 mg by mouth once daily. Pt takes a total of 6 mg every morning.) 450 tablet 1    PREMARIN vaginal cream PLACE 0.5 GRAM VAGINALLY 3 (THREE) TIMES A WEEK. 30 g 1    promethazine (PHENERGAN) 25 MG tablet Take 1 tablet (25 mg total) by mouth every 6 (six) hours as needed for Nausea. 15 tablet 0    sucralfate (CARAFATE) 1 gram tablet Take 1 tablet (1 g total) by mouth 4 (four) times daily. 360 tablet 3       Past Surgical History:   Procedure Laterality Date    BREAST BIOPSY Left 01/29/2002    core bx    CARPAL TUNNEL RELEASE Right 05/2017    CHOLECYSTECTOMY  2004    COLONOSCOPY      10/11    COLONOSCOPY N/A 6/29/2017    Procedure: COLONOSCOPY;  Surgeon: López Moore MD;  Location: [facility] ([address] [place] [address]);  Service: Endoscopy;  Laterality: N/A;    EPIDURAL STEROID INJECTION N/A 11/18/2021     Procedure: INJECTION, STEROID, EPIDURAL, L5-S1 IL NEED CONSENT;  Surgeon: Tj Mayfield MD;  Location: [facility] PAIN MGT;  Service: Pain Management;  Laterality: N/A;    EPIDURAL STEROID INJECTION N/A 9/29/2022    Procedure: LUMBAR L3/L4 IL MADELINE CONTRAST;  Surgeon: Tj Mayfield MD;  Location: [facility]IN [facility]GT;  Service: Pain Management;  Laterality: N/A;    EPIDURAL STEROID INJECTION N/A 12/12/2022    Procedure: INJECTION, STEROID, EPIDURAL L5/S1 IL CONTRAST;  Surgeon: Tj Mayfield MD;  Location: [facility];  Service: Pain Management;  Laterality: N/A;    EPIDURAL STEROID INJECTION N/A 4/6/2023    Procedure: INJECTION, STEROID, EPIDURAL L5/S1;  Surgeon: Tj Mayfield MD;  Location: [facility] PAIN MGT;  Service: Pain Management;  Laterality: N/A;    FOOT ARTHRODESIS Left 1/11/2023    Procedure: FUSION, FOOT;  Surgeon: Ariella Finnegan MD;  Location: [facility] OR;  Service: Orthopedics;  [facility]    HARVESTING OF BONE GRAFT Left 1/11/2023    Procedure: SURGICAL PROCUREMENT, BONE GRAFT;  Surgeon: Ariella Finnegan MD;  Location: [facility] OR;  Service: Orthopedics;  Laterality: Left;    INJECTION OF ANESTHETIC AGENT AROUND NERVE Bilateral 8/23/2021    Procedure: BLOCK, NERVE, MEDIAL BRANCH L3,L4,L5;  Surgeon: Tj Mayfield MD;  Location: [facility] PAIN MGT;  Service: Pain Management;  Laterality: Bilateral;  1 of 2    INJECTION OF ANESTHETIC AGENT AROUND NERVE Bilateral 8/26/2021    Procedure: BLOCK, NERVE, MEDIAL BRANCH L3,L4,L5;  Surgeon: Tj Mayfield MD;  Location: [facility];  Service: Pain Management;  Laterality: Bilateral;  2 of 2    INJECTION OF ANESTHETIC AGENT AROUND NERVE Left 7/7/2022    Procedure: BLOCK, NERVE, LEFT OBTURATOR AND FEMORAL;  Surgeon: Tj Mayfield MD;  Location: [facility] PAIN [facility]GT;  Service: Pain Management;  Laterality: Left;    INJECTION OF FACET JOINT Bilateral 3/12/2020    Procedure: FACET JOINT INJECTION (LUMBAR BLOCK) BILATERAL L4-5 AND L5-S1 DIRECT REFERRAL;  Surgeon: Tj Mayfield MD;   Location: [facility];  Service: Pain Management;  Laterality: Bilateral;  NEEDS CONSENT    INJECTION OF FACET JOINT Bilateral 3/29/2021    Procedure: INJECTION, FACET JOINT L3/4, L4/5, L5/S1;  Surgeon: Tj Mayfield MD;  [place] [facility] [place];  Service: Pain Management;  Laterality: Bilateral;    INJECTION OF JOINT Right 7/30/2020    Procedure: INJECTION, JOINT, RIGHT HIP Interarticular under flouro;  Surgeon: Tj Mayfield MD;  Location: [facility];  Service: Pain Management;  Laterality: Right;  INJECTION, JOINT, RIGHT HIP Interarticular under flouro    INJECTION OF JOINT Right 2/21/2022    Procedure: Injection, Joint RIGHT ISCHIAL BURSA;  Surgeon: Tj Mayfield MD;  [place]: [facility] [place];  Service: Pain Management;  Laterality: Right;    INTRAMEDULLARY RODDING OF FEMUR Left 5/21/2019    Procedure: INSERTION, INTRAMEDULLARY ARIEL, FEMUR;  Surgeon: López Duff MD;  [address]  Service: Orthopedics;  Laterality: Left;    LENGTHENING OF TENDON OF LOWER EXTREMITY Left 1/11/2023    Procedure: LENGTHENING, TENDON, LOWER EXTREMITY;  Surgeon: Ariella Finnegan MD;  Location: [facility] OR;  Service: Orthopedics;  Laterality: Left;    MAGNETIC RESONANCE IMAGING N/A 5/29/2023    Procedure: MRI (Magnetic Resonance Imagine);  Surgeon: Sujey Robin;  Location: [facility];  Service: Anesthesiology;  Laterality: N/A;    RADIOFREQUENCY ABLATION Left 9/20/2021    Procedure: RADIOFREQUENCY ABLATION left L3,4,5 RFA  1 of 2  CONSENT NEEDED;  Surgeon: Tj Mayfield MD;  Location: [facility] [place];  Service: Pain Management;  Laterality: Left;    RADIOFREQUENCY ABLATION Right 10/4/2021    Procedure: RADIOFREQUENCY ABLATION  right L3,4,5 RFA   2 of 2  CONSENT NEEDED;  Surgeon: Tj Mayfield MD;  Location: [facility] [place];  Service: Pain Management;  Laterality: Right;    RADIOFREQUENCY ABLATION Left 4/21/2022    Procedure: Radiofrequency Ablation LEFT L3,L4,L5;  Surgeon: Tj Mayfield MD;  Location: [facility] [place];   Service: Pain Management;  Laterality: Left;    RADIOFREQUENCY ABLATION Right 5/12/2022    Procedure: Radiofrequency Ablation RIGHT L3,L4,L5;  Surgeon: Tj Mayfield MD;  Location: [facility];  Service: Pain Management;  Laterality: Right;    RADIOFREQUENCY ABLATION Left 7/25/2022    Procedure: Radiofrequency Ablation Left Femoral & Obturator;  Surgeon: Tj Mayfield MD;  Location: [facility];  Service: Pain Management;  Laterality: Left;    REPAIR OF TRICEPS TENDON Left 10/17/2018    Procedure: REPAIR, TENDON, TRICEPS left elbow;  Surgeon: Staci Yarbrough MD;  Location: [facility] OR 1ST FLR;  Service: Orthopedics;  Laterality: Left;  Anesthesia: General and regional. PRONE, k-wire , hand pan 1 and pan 2, CALL ARTHREX/Tamera notified 10-12 LO    TONSILLECTOMY      TRANSFORAMINAL EPIDURAL INJECTION OF STEROID Right 8/31/2020    Procedure: INJECTION, STEROID, EPIDURAL, TRANSFORAMINAL,  APPROACH, L3-L4 and L4-L5 need consent;  Surgeon: Tj Mayfield MD;  Location: [facility];  Service: Pain Management;  Laterality: Right;    TRANSFORAMINAL EPIDURAL INJECTION OF STEROID Bilateral 7/23/2021    Procedure: INJECTION, STEROID, EPIDURAL, TRANSFORAMINAL APPROACH L4/5;  Surgeon: Tj Mayfield MD;  Location: [facility] PAIN MGT;  Service: Pain Management;  Laterality: Bilateral;    TRIGGER POINT INJECTION N/A 7/23/2021    Procedure: INJECTION, TRIGGER POINT SCAPULAR;  Surgeon: Tj Mayfield MD;  Location: [facility] PAIN MGT;  Service: Pain Management;  Laterality: N/A;    TUBAL LIGATION  2003    UPPER GASTROINTESTINAL ENDOSCOPY      10/11    uterine ablation  2003       Social History     Socioeconomic History    Marital status:    Tobacco Use    Smoking status: Never    Smokeless tobacco: Never   Substance and Sexual Activity    Alcohol use: Yes     Comment: 2-3 times per year.    Drug use: No    Sexual activity: Yes     Partners: Male     Birth control/protection: Surgical     Social Determinants of  Health     Financial Resource Strain: Unknown    Difficulty of Paying Living Expenses: Patient refused   Food Insecurity: Unknown    Worried About Running Out of Food in the Last Year: Patient refused    Ran Out of Food in the Last Year: Patient refused   Transportation Needs: Unknown    Lack of Transportation (Medical): Patient refused    Lack of Transportation (Non-Medical): Patient refused   Physical Activity: Unknown    Days of Exercise per Week: Patient refused   Stress: No Stress Concern Present    Feeling of Stress : Not at all   Social Connections: Unknown    Frequency of Communication with Friends and Family: More than three times a week    Frequency of Social Gatherings with Friends and Family: Twice a week    Active Member of Clubs or Organizations: Patient refused    Attends Club or Organization Meetings: More than 4 times per year    Marital Status:    Housing Stability: Unknown    Unable to Pay for Housing in the Last Year: Patient refused    Unstable Housing in the Last Year: Patient refused         Pre-op Assessment    I have reviewed the Patient Summary Reports.     I have reviewed the Nursing Notes. I have reviewed the NPO Status.      Review of Systems  Anesthesia Hx:  No problems with previous Anesthesia    Cardiovascular:   CAD      Pulmonary:   Asthma    Hepatic/GI:   GERD Liver Disease,    Musculoskeletal:   Arthritis     Neurological:   Neuromuscular Disease,        Physical Exam    Airway:  Mouth Opening: Normal  Tongue: Normal    Chest/Lungs:  Normal Respiratory Rate    Heart:  Rhythm: Regular Rhythm        Anesthesia Plan  Type of Anesthesia, risks & benefits discussed:    Anesthesia Type: Gen ETT  Intra-op Monitoring Plan: Standard ASA Monitors  Induction:  IV  Informed Consent: Informed consent signed with the Patient and all parties understand the risks and agree with anesthesia plan.  All questions answered.   ASA Score: 3    Ready For Surgery From Anesthesia  Perspective.     .

## 2023-06-22 NOTE — OP NOTE
DATE OF PROCEDURE: 6/22/23     SURGEON: Jh Mosqueda M.D.      FIRST ASSISTANT-SURGEON: Krystal Vargas PA-C, note no resident was available.     PREOPERATIVE DIAGNOSIS:  1.  L4-5  spondylolisthesis, severe lumbar stenosis, and refractory lumbar radiculopathy     POSTOPERATIVE DIAGNOSIS:   1.  L4-5  spondylolisthesis, severe lumbar stenosis, and refractory lumbar radiculopathy     PROCEDURES PERFORMED:   Combined posterolateral and posterior lumbar interbody fusion  L4-5  2.   Posterior nonsegmental instrumentation L4-5  3.   Application of titanium intervertebral spacer device L4-5 disc defect  4.   L4 laminectomy for decompression of central and bilateral foraminal stenosis  5.   Local bone graft      ANESTHESIA:  General endotracheal anesthesia.      SPECIMEN:  None     FINDINGS:  None     BLOOD LOSS:  150 cc     BLOOD PRODUCTS TRANSFUSED:  None     COMPLICATIONS: None.      COUNTS: Correct x 2.      DISPOSITION: Recovery Room, stable.      IMPLANTS:  Spine wave screws, and a size 9 titanium intervertebral spacer     NEUROLOGICAL MONITORING NOTES:  Somatosensory-evoked potentials, free run EMG, an EMG stimulation lumbar pedicle screws.  Please note that there were no changes to stable unreliable bilateral upper and lower extremity somatosensory evoked potentials throughout the entire procedure, and no spontaneous EMG activity.  All screws stimulated at greater than 40 milliamp.  The left L5 nerve root stimulated at 2 milliamps.     INDICATIONS FOR PROCEDURE:  The patient is a 62-year-old female with a grade 2 L4-5 degenerative spondylolisthesis, severe spinal stenosis, and refractory lumbar radiculopathy.  She has failed extensive conservative management and is here for surgery today.     DESCRIPTION OF INFORMED CONSENT:  Please see my last clinic note for full description of the informed consent process I had with the patient         PROCEDURE IN DETAIL: The patient was met in the preoperative area where  all final questions were answered. Their lumbar spine was marked as the operative site. The patient was then brought to the operating room where anesthesia was induced. A alvarenga catheter was inserted in a standard sterile fashion. The patient was then flipped prone onto the Jovanni spine table. All bony prominences were padded, paying special attention to the eyes, nose and mouth, axillae, ulnar nerve and hands, genitalia, knees and feet, this was confirmed to be adequate with the anesthesia team. Sequential compression devices were run throughout the case on the bilateral calves.  The surgical field was prepped and draped in normal sterile manner after using fluoroscopy to localize our incisoin.  A full time-out was then called, verifying patient's identity, surgery, site, and that they had been administered a weight appropriate dose of Ancef, no specific nursing, anesthetic or surgical concerns were identified and it was decided that it was safe to proceed with surgery. I informed all members of the operative team that they were empowered to speak up regarding concerns at any time during the procedure.     At this point I made a midline lumbar incision and performed a preliminary subperiosteal exposure of the L4 and L5 lamina, the bilateral L3-4 and L4-5 facet joints, and the transverse processes at L4 and L5 bilaterally.  At the conclusion of my preliminary exposure I placed a marker in what I took to be the left L4 pedicle, took a lateral radiograph, and this confirm my levels I then finalized my exposure.  Next I performed bilateral L4-5 facetectomies with an osteotome.  I then placed freehand pedicle screws bilaterally into the L4 and L5 pedicles using anatomic landmarks and freehand technique.  Acceptable position as well as correct level of all implants was then confirmed on AP and lateral intraoperative fluoroscopic radiographs.  Acceptable position of the screws and correct level was confirmed on AP and  lateral intraoperative fluoroscopic views.  Next I began my neurological decompression portion procedure.  The L4 lamina was thinned and removed in a standard fashion.  I then performed a central as well as bilateral foraminal decompression with a Kerrison punch, this was more than what was required for cage placement.  Working on the right of midline performed a subtotal L4-5 diskectomy including decompression of the exiting and traversing nerve roots.  I then placed a size 9 spacer packed with bone graft here.  AP and lateral radiographs were taken demonstrating correct level as well as acceptable position of cages.  Rods were measured contoured and locked into place with set plugs and torque wrench.  The wound was copiously irrigated.  The L4 and L5 transverse processeswere decorticated bilaterally.  The patient's morselized bone graft was mixed with 1 g vancomycin powder and laid from L4-5 along the decorticated transverse processes/sacral ala bilaterally.  A deep drain was then placed.     500 mg of vancomycin powder was placed in the deep space, and a deep drain was then placed.  The deep fascia was then closed with #1 Vicryl sutures in an interrupted, figure-of-eight fashion.  The superficial layer was then irrigated and closed over a drain and an additional 500 mg of vancomycin powder with 2-0 Vicryl, 3-0 Monocryl, Dermabond and Steri-Strips.  A soft dressing was then placed.  The drains were secured in place with silk sutures.  The patient was then flipped supine, extubated, and transferred to the recovery room in stable condition

## 2023-06-22 NOTE — TRANSFER OF CARE
Anesthesia Transfer of Care Note    Patient: Joyce Peterson    Procedure(s) Performed: Procedure(s) (LRB):  FUSION, SPINE, LUMBAR, TLIF, POSTERIOR APPROACH, USING PEDICLE SCREW L4/5 spinewave SNS:SSEP/EMG (N/A)    Patient location: PACU    Anesthesia Type: general    Transport from OR: Transported from OR on 6-10 L/min O2 by face mask with adequate spontaneous ventilation    Post pain: adequate analgesia    Post assessment: no apparent anesthetic complications    Post vital signs: stable    Level of consciousness: sedated and responds to stimulation    Nausea/Vomiting: no nausea/vomiting    Complications: none    Transfer of care protocol was followed      Last vitals:   Visit Vitals  BP (!) 99/54 (BP Location: Right arm, Patient Position: Lying)   Pulse 80   Temp 36.6 °C (97.9 °F) (Temporal)   Resp 12   LMP  (LMP Unknown)   SpO2 95%   Breastfeeding No

## 2023-06-22 NOTE — ANESTHESIA PROCEDURE NOTES
Intubation    Date/Time: 6/22/2023 1:46 PM  Performed by: Yohana Dupont CRNA  Authorized by: Harjeet Delgado MD     Intubation:     Induction:  Intravenous    Intubated:  Postinduction    Mask Ventilation:  Easy mask    Attempts:  1    Attempted By:  Student    Method of Intubation:  Direct    Blade:  Ronal 3    Laryngeal View Grade: Grade IIA - cords partially seen      Difficult Airway Encountered?: No      Complications:  None    Airway Device:  Oral endotracheal tube    Airway Device Size:  7.5    Style/Cuff Inflation:  Cuffed    Inflation Amount (mL):  9    Tube secured:  22    Secured at:  The teeth    Placement Verified By:  Capnometry    Complicating Factors:  None    Findings Post-Intubation:  BS equal bilateral and atraumatic/condition of teeth unchanged

## 2023-06-22 NOTE — PLAN OF CARE
Chart reviewed. Preop nursing care completed per orders. Safe surgery checklist complete. Family at bedside and to take belongings. Pt has small open area on left ankle from previous ankle surgery. Call bell within reach. Instructed pt to call for assistance.

## 2023-06-22 NOTE — NURSING TRANSFER
Nursing Transfer Note      6/22/2023     Reason patient is being transferred: post procedure     Transfer To: 511    Transfer via bed    Transported by transport     Medicines sent: yes     Any special needs or follow-up needed: routine     Chart send with patient: Yes    Notified: spouse    Patient reassessed at: 1815 on 6/22

## 2023-06-23 DIAGNOSIS — M51.36 DDD (DEGENERATIVE DISC DISEASE), LUMBAR: Primary | ICD-10-CM

## 2023-06-23 PROBLEM — M48.061 LUMBAR STENOSIS: Status: ACTIVE | Noted: 2023-06-23

## 2023-06-23 PROBLEM — R60.0 LOWER EXTREMITY EDEMA: Status: ACTIVE | Noted: 2023-06-23

## 2023-06-23 LAB
ANION GAP SERPL CALC-SCNC: 4 MMOL/L (ref 8–16)
BASOPHILS # BLD AUTO: 0.02 K/UL (ref 0–0.2)
BASOPHILS NFR BLD: 0.4 % (ref 0–1.9)
BNP SERPL-MCNC: 59 PG/ML (ref 0–99)
BUN SERPL-MCNC: 7 MG/DL (ref 8–23)
CALCIUM SERPL-MCNC: 7.5 MG/DL (ref 8.7–10.5)
CHLORIDE SERPL-SCNC: 111 MMOL/L (ref 95–110)
CO2 SERPL-SCNC: 26 MMOL/L (ref 23–29)
CREAT SERPL-MCNC: 0.7 MG/DL (ref 0.5–1.4)
DIFFERENTIAL METHOD: ABNORMAL
EOSINOPHIL # BLD AUTO: 0 K/UL (ref 0–0.5)
EOSINOPHIL NFR BLD: 0.2 % (ref 0–8)
ERYTHROCYTE [DISTWIDTH] IN BLOOD BY AUTOMATED COUNT: 16.5 % (ref 11.5–14.5)
EST. GFR  (NO RACE VARIABLE): >60 ML/MIN/1.73 M^2
GLUCOSE SERPL-MCNC: 75 MG/DL (ref 70–110)
HCT VFR BLD AUTO: 26.6 % (ref 37–48.5)
HGB BLD-MCNC: 8.4 G/DL (ref 12–16)
IMM GRANULOCYTES # BLD AUTO: 0.01 K/UL (ref 0–0.04)
IMM GRANULOCYTES NFR BLD AUTO: 0.2 % (ref 0–0.5)
LYMPHOCYTES # BLD AUTO: 1 K/UL (ref 1–4.8)
LYMPHOCYTES NFR BLD: 20.9 % (ref 18–48)
MAGNESIUM SERPL-MCNC: 2.2 MG/DL (ref 1.6–2.6)
MCH RBC QN AUTO: 27 PG (ref 27–31)
MCHC RBC AUTO-ENTMCNC: 31.6 G/DL (ref 32–36)
MCV RBC AUTO: 86 FL (ref 82–98)
MONOCYTES # BLD AUTO: 0.5 K/UL (ref 0.3–1)
MONOCYTES NFR BLD: 9.9 % (ref 4–15)
NEUTROPHILS # BLD AUTO: 3.2 K/UL (ref 1.8–7.7)
NEUTROPHILS NFR BLD: 68.4 % (ref 38–73)
NRBC BLD-RTO: 0 /100 WBC
PHOSPHATE SERPL-MCNC: 2.5 MG/DL (ref 2.7–4.5)
PLATELET # BLD AUTO: 174 K/UL (ref 150–450)
PMV BLD AUTO: 11.4 FL (ref 9.2–12.9)
POTASSIUM SERPL-SCNC: 3.4 MMOL/L (ref 3.5–5.1)
RBC # BLD AUTO: 3.11 M/UL (ref 4–5.4)
SODIUM SERPL-SCNC: 141 MMOL/L (ref 136–145)
WBC # BLD AUTO: 4.64 K/UL (ref 3.9–12.7)

## 2023-06-23 PROCEDURE — 84100 ASSAY OF PHOSPHORUS: CPT

## 2023-06-23 PROCEDURE — 99900035 HC TECH TIME PER 15 MIN (STAT)

## 2023-06-23 PROCEDURE — 25000003 PHARM REV CODE 250

## 2023-06-23 PROCEDURE — 83735 ASSAY OF MAGNESIUM: CPT

## 2023-06-23 PROCEDURE — 25000003 PHARM REV CODE 250: Performed by: PHYSICIAN ASSISTANT

## 2023-06-23 PROCEDURE — 97116 GAIT TRAINING THERAPY: CPT

## 2023-06-23 PROCEDURE — 11000001 HC ACUTE MED/SURG PRIVATE ROOM

## 2023-06-23 PROCEDURE — 63600175 PHARM REV CODE 636 W HCPCS: Performed by: PHYSICIAN ASSISTANT

## 2023-06-23 PROCEDURE — 99223 1ST HOSP IP/OBS HIGH 75: CPT | Mod: ,,, | Performed by: STUDENT IN AN ORGANIZED HEALTH CARE EDUCATION/TRAINING PROGRAM

## 2023-06-23 PROCEDURE — 97162 PT EVAL MOD COMPLEX 30 MIN: CPT

## 2023-06-23 PROCEDURE — 36415 COLL VENOUS BLD VENIPUNCTURE: CPT

## 2023-06-23 PROCEDURE — 99223 PR INITIAL HOSPITAL CARE,LEVL III: ICD-10-PCS | Mod: ,,, | Performed by: STUDENT IN AN ORGANIZED HEALTH CARE EDUCATION/TRAINING PROGRAM

## 2023-06-23 PROCEDURE — 97535 SELF CARE MNGMENT TRAINING: CPT

## 2023-06-23 PROCEDURE — 80048 BASIC METABOLIC PNL TOTAL CA: CPT

## 2023-06-23 PROCEDURE — 97165 OT EVAL LOW COMPLEX 30 MIN: CPT

## 2023-06-23 PROCEDURE — 85025 COMPLETE CBC W/AUTO DIFF WBC: CPT | Performed by: PHYSICIAN ASSISTANT

## 2023-06-23 PROCEDURE — 83880 ASSAY OF NATRIURETIC PEPTIDE: CPT

## 2023-06-23 PROCEDURE — 63600175 PHARM REV CODE 636 W HCPCS

## 2023-06-23 RX ORDER — IPRATROPIUM BROMIDE AND ALBUTEROL SULFATE 2.5; .5 MG/3ML; MG/3ML
3 SOLUTION RESPIRATORY (INHALATION) EVERY 4 HOURS PRN
Status: DISCONTINUED | OUTPATIENT
Start: 2023-06-23 | End: 2023-06-28 | Stop reason: HOSPADM

## 2023-06-23 RX ORDER — FUROSEMIDE 10 MG/ML
20 INJECTION INTRAMUSCULAR; INTRAVENOUS ONCE
Status: COMPLETED | OUTPATIENT
Start: 2023-06-23 | End: 2023-06-23

## 2023-06-23 RX ORDER — FLUTICASONE FUROATE AND VILANTEROL 100; 25 UG/1; UG/1
1 POWDER RESPIRATORY (INHALATION) DAILY
Status: DISCONTINUED | OUTPATIENT
Start: 2023-06-24 | End: 2023-06-28 | Stop reason: HOSPADM

## 2023-06-23 RX ADMIN — METHOCARBAMOL 750 MG: 750 TABLET ORAL at 09:06

## 2023-06-23 RX ADMIN — PROMETHAZINE HYDROCHLORIDE 25 MG: 12.5 TABLET ORAL at 06:06

## 2023-06-23 RX ADMIN — PROMETHAZINE HYDROCHLORIDE 25 MG: 12.5 TABLET ORAL at 11:06

## 2023-06-23 RX ADMIN — HYDROMORPHONE HYDROCHLORIDE 4 MG: 2 TABLET ORAL at 08:06

## 2023-06-23 RX ADMIN — HYDROMORPHONE HYDROCHLORIDE 4 MG: 2 TABLET ORAL at 09:06

## 2023-06-23 RX ADMIN — FUROSEMIDE 20 MG: 10 INJECTION, SOLUTION INTRAMUSCULAR; INTRAVENOUS at 05:06

## 2023-06-23 RX ADMIN — SENNOSIDES AND DOCUSATE SODIUM 1 TABLET: 50; 8.6 TABLET ORAL at 08:06

## 2023-06-23 RX ADMIN — METHOCARBAMOL 750 MG: 750 TABLET ORAL at 02:06

## 2023-06-23 RX ADMIN — ATORVASTATIN CALCIUM 80 MG: 40 TABLET, FILM COATED ORAL at 08:06

## 2023-06-23 RX ADMIN — METHOCARBAMOL 750 MG: 750 TABLET ORAL at 05:06

## 2023-06-23 RX ADMIN — CEFAZOLIN 2 G: 2 INJECTION, POWDER, FOR SOLUTION INTRAMUSCULAR; INTRAVENOUS at 02:06

## 2023-06-23 RX ADMIN — LEFLUNOMIDE 20 MG: 20 TABLET ORAL at 08:06

## 2023-06-23 RX ADMIN — HEPARIN SODIUM 5000 UNITS: 5000 INJECTION INTRAVENOUS; SUBCUTANEOUS at 02:06

## 2023-06-23 RX ADMIN — PREGABALIN 100 MG: 50 CAPSULE ORAL at 09:06

## 2023-06-23 RX ADMIN — ACETAMINOPHEN 650 MG: 325 TABLET ORAL at 05:06

## 2023-06-23 RX ADMIN — METHOCARBAMOL 750 MG: 750 TABLET ORAL at 08:06

## 2023-06-23 RX ADMIN — PREGABALIN 100 MG: 50 CAPSULE ORAL at 02:06

## 2023-06-23 RX ADMIN — HYDROMORPHONE HYDROCHLORIDE 4 MG: 2 TABLET ORAL at 06:06

## 2023-06-23 RX ADMIN — MUPIROCIN: 20 OINTMENT TOPICAL at 08:06

## 2023-06-23 RX ADMIN — SODIUM CHLORIDE 500 ML: 0.9 INJECTION, SOLUTION INTRAVENOUS at 08:06

## 2023-06-23 RX ADMIN — HYDROMORPHONE HYDROCHLORIDE 4 MG: 2 TABLET ORAL at 03:06

## 2023-06-23 RX ADMIN — PREGABALIN 100 MG: 50 CAPSULE ORAL at 08:06

## 2023-06-23 RX ADMIN — HYDROMORPHONE HYDROCHLORIDE 4 MG: 2 TABLET ORAL at 11:06

## 2023-06-23 RX ADMIN — SENNOSIDES AND DOCUSATE SODIUM 1 TABLET: 50; 8.6 TABLET ORAL at 09:06

## 2023-06-23 RX ADMIN — HYDROMORPHONE HYDROCHLORIDE 4 MG: 2 TABLET ORAL at 12:06

## 2023-06-23 RX ADMIN — HEPARIN SODIUM 5000 UNITS: 5000 INJECTION INTRAVENOUS; SUBCUTANEOUS at 09:06

## 2023-06-23 RX ADMIN — MUPIROCIN: 20 OINTMENT TOPICAL at 09:06

## 2023-06-23 RX ADMIN — HEPARIN SODIUM 5000 UNITS: 5000 INJECTION INTRAVENOUS; SUBCUTANEOUS at 05:06

## 2023-06-23 RX ADMIN — CEFAZOLIN 2 G: 2 INJECTION, POWDER, FOR SOLUTION INTRAMUSCULAR; INTRAVENOUS at 05:06

## 2023-06-23 RX ADMIN — HYDROMORPHONE HYDROCHLORIDE 4 MG: 2 TABLET ORAL at 04:06

## 2023-06-23 RX ADMIN — OXYBUTYNIN CHLORIDE 5 MG: 5 TABLET, EXTENDED RELEASE ORAL at 08:06

## 2023-06-23 NOTE — PLAN OF CARE
06/23/23 1436   Post-Acute Status   Post-Acute Authorization Home Health   Home Health Status Set-up Complete/Auth obtained   Hospital Resources/Appts/Education Provided Appointments scheduled and added to AVS   Discharge Plan   Discharge Plan A Home with family;Home Health     Referral sent to [facility]  for post-acute care.    Brigida HORTA  Case Management  [facility]  [phone]

## 2023-06-23 NOTE — ASSESSMENT & PLAN NOTE
62 y.o F with RA (on leflunomide and prednisone), Sjogren's, steroid induced osteoporosis, GERD, HLD, CAD, asthma (on symbicort) admitted for L4/L5 TLIF which she completed on 6/22    Medicine consulted for wheezing/crackles. On evaluation, patient in no acute distress. Comfortable, no increasaed WOB, saturating well on room air. She did endorse wheezing earlier that morning after a coughing fit. She has hx of asthma and her home inhaler was not restarted. She has been having > 2 month history of BLE edema that she contributes to sleeping in a recliner (2/2 back pain, not SOB). Denies hx of heart failure. Last TTE in 2020 with no systolic or diastolic dysfunction.     Lungs clear to auscultation on exam. 1+ pitting BLE edema to level of lower thighs.     Recommendations:  - F/u CXR  - Patient's home inhaler symbicort not on formulary, will start Breo which has similar components (inhaled corticosteroids/LABA)  - Placed order for duonebs q4h prn while awake  - One of patient's triggers is GERD, please ensure PPI or H2 blocker administered  - Last TTE in 2020 negative for diastolic/systolic dysfunction, but will obtain BNP and new TTE out of abundance of caution. High suspicion BLE edema 2/2 venous insufficiency as patient reports edema is largely dependent.   - Will give 1x Lasix 20 IVP in the meantime. She may continue home PO 20mg afterwards.   - Wound care consulted for RLE samir noted

## 2023-06-23 NOTE — ASSESSMENT & PLAN NOTE
Joyce RAMU Kristen is a 62 y.o. female s/p L4/L5 TLIF on 6/22      Pain control: MM  PT/OT: WBAT BLE  DVT PPx: heparin 5k tid, SCDs at all times when not ambulating  Abx: postop Ancef  Labs: stable  Drain: 110cc since OR  Cervantes: dced    Dispo: f/u PT recs

## 2023-06-23 NOTE — PLAN OF CARE
Problem: Physical Therapy  Goal: Physical Therapy Goal  Description: Goals to met by 7/7/2023    1. Supine to sit with Modified [place]  2. Sit to supine with Modified [place]  3. Rolling to Left and Right with Modified [place].  4. Sit to stand transfer with Stand-by Assistance  5. Bed to chair transfer with Stand-by Assistance using Rolling Walker  6. Gait  x 150 feet with Contact Guard Assistance using LRAD   7. Ascend/descend 5 stair(s) with right Handrails Contact Guard Assistance using No Assistive Device.   8. Lower extremity exercise program x15 reps per Instruction, with assistance as needed in order to facilitate improved strength, improved postural control, and improvement in functional independence    PT eval: 6/23/2023

## 2023-06-23 NOTE — SUBJECTIVE & OBJECTIVE
"Principal Problem:Lumbar stenosis    Principal Orthopedic Problem: s/p L4/L5 TLIF on 6/22    Interval History: Naeon. VSS. Pain controlled. Complaining of some wheezing this AM. Dressings saturated overnight, changed today. Drain output 110cc since OR.    Review of patient's allergies indicates:   Allergen Reactions    Actemra [tocilizumab] Other (See Comments)     Severe dizziness    Codeine Nausea And Vomiting and Hives    Gold au 198 Hives and Rash    Hydroxychloroquine Other (See Comments)     Can't remember the reaction      Iodinated contrast media Other (See Comments)     Other reaction(s): BURNING ALL OVER    Iodine Other (See Comments) and Hives     Other reaction(s): BURNING ALL OVER - Iodine dye - Not topical    Ondansetron Nausea And Vomiting    Sulfa (sulfonamide antibiotics) Other (See Comments)     Can't remember the reaction    Zofran [ondansetron hcl (pf)] Nausea And Vomiting     Pt reports that last time she received zofran she started vomiting again    Methotrexate analogues Nausea Only    Pneumococcal vaccine Other (See Comments)    Pneumovax 23 [pneumococcal 23-argenis ps vaccine] Other (See Comments)     "sick"    Methotrexate Nausea Only       Current Facility-Administered Medications   Medication    0.9%  NaCl infusion    acetaminophen tablet 650 mg    atorvastatin tablet 80 mg    ceFAZolin 2 g in dextrose 5 % in water (D5W) 5 % 50 mL IVPB (MB+)    famotidine tablet 20 mg    furosemide tablet 20 mg    heparin (porcine) injection 5,000 Units    HYDROmorphone tablet 2 mg    And    HYDROmorphone tablet 4 mg    leflunomide tablet 20 mg    methocarbamoL tablet 750 mg    mupirocin 2 % ointment    oxybutynin 24 hr tablet 5 mg    oxyCODONE immediate release tablet 5 mg    oxyCODONE immediate release tablet Tab 10 mg    polyethylene glycol packet 17 g    pregabalin capsule 100 mg    promethazine tablet 25 mg    senna-docusate 8.6-50 mg per tablet 1 tablet    sodium chloride 0.9% flush 5 mL     Objective: "     Vital Signs (Most Recent):  Temp: 99.2 °F (37.3 °C) (06/23/23 0443)  Pulse: 91 (06/23/23 0443)  Resp: 16 (06/23/23 0443)  BP: (!) 109/55 (06/23/23 0443)  SpO2: 95 % (06/23/23 0443) Vital Signs (24h Range):  Temp:  [97.9 °F (36.6 °C)-99.2 °F (37.3 °C)] 99.2 °F (37.3 °C)  Pulse:  [80-95] 91  Resp:  [12-22] 16  SpO2:  [94 %-100 %] 95 %  BP: ()/(54-74) 109/55           There is no height or weight on file to calculate BMI.      Intake/Output Summary (Last 24 hours) at 6/23/2023 0630  Last data filed at 6/23/2023 0612  Gross per 24 hour   Intake 1150 ml   Output 110 ml   Net 1040 ml        Ortho/SPM Exam   Awake/alert/oriented x3, No acute distress, Afebrile, Vital signs stable  Good inspiratory effort with unlaboured breathing  Dressings c/d/I  Drain in place  NVI BUE/BLE    Significant Labs: All pertinent labs within the past 24 hours have been reviewed.    Significant Imaging: I have reviewed all pertinent imaging results/findings.

## 2023-06-23 NOTE — PT/OT/SLP EVAL
Physical Therapy Co-Evaluation and Co-Treatment  Co-evaluation with OT for maximal pt participation, safety, and activity tolerance    Patient Name:  Joyce Peterson   MRN:  [id number]  Admit Date: 6/22/2023  Admitting Diagnosis:  Lumbar stenosis   Length of Stay: 1 days  Recent Surgery: Procedure(s) (LRB):  FUSION, SPINE, LUMBAR, TLIF, POSTERIOR APPROACH, USING PEDICLE SCREW L4/5 spinewave SNS:SSEP/EMG (N/A) 1 Day Post-Op    Recommendations:     Discharge Recommendations:  home health PT   Discharge Equipment Recommendations: walker, rolling   Barriers to discharge: Inaccessible home environment and Evolving Clinical Presentation    Assessment:     Joyce Peterson is a 62 y.o. female admitted with a medical diagnosis of Lumbar stenosis.  She presents with the following impairments/functional limitations: weakness, impaired endurance, gait instability, impaired balance, impaired self care skills, decreased lower extremity function, orthopedic precautions, pain, impaired functional mobility, decreased safety awareness.     Pt agreeable to therapy, reporting severe pain upon arrival. Pt stands form EOB and toilet with CGA, ambulates w/ HHA and min A, limited by pain and fatigue. Continue to increase gait distance and incorporate stairs as able.    Rehab Prognosis: Good; patient would benefit from acute skilled PT services to address these deficits and reach maximum level of function.      Treatment Tolerated: Fair    Highest level of mobility achieved this visit: ambulates 12ft x 2 w/ HHA and min A    Activity with RN/PCT:  ambulate with 1 person assist    Plan:     During this hospitalization, patient to be seen 4 x/week to address the identified rehab impairments via gait training, therapeutic activities, therapeutic exercises, neuromuscular re-education and progress towards the established goals.    Plan of Care Expires:  07/23/23    Subjective     RN notified prior to session. Pt's  present upon PT  "entrance into room.    Chief Complaint: pain  Patient/Family Comments/goals: "I'm more comfortable up in the chair"  Pain/Comfort:  Pain Rating 1:  (150/10)  Location - Orientation 1: generalized  Location 1: back  Pain Addressed 1: Reposition, Distraction, Pre-medicate for activity    Social History:  Residence: lives with their spouse 1-story house with 5 NIMESH and R HR.  Support available: Spouse  Equipment Used: bedside commode, bath bench, cane, straight, wheelchair  Equipment Owned (not using): None  Prior level of function: independent  Work: Homemaker.   Drive: yes.       Objective:     Additional staff present: Nelly Diop and Luzma    Patient found sitting edge of bed with: hemovac, peripheral IV     General Precautions: Standard, Cardiac fall   Orthopedic Precautions:spinal precautions   Braces: N/A   There is no height or weight on file to calculate BMI.  Oxygen Device: Room Air  Vitals: BP (!) 97/59 (BP Location: Right arm, Patient Position: Lying)   Pulse 88   Temp 99.5 °F (37.5 °C) (Oral)   Resp 18   LMP  (LMP Unknown)   SpO2 97%   Breastfeeding No     Exams:  Cognition:   Alert  Command following: Follows one-step verbal commands  Fluency: clear/fluent  Hearing: Intact  Vision:  Intact  Skin Integrity: Visible skin intact    Physical Exam:   Edema - Pitting KHOI LEs with weeping  ROM - KHOI LEs WFL  Strength - L hip 4+/5, R hip 4-/5, L knee ankld ankle 5/5, R 4+/5   Sensation - Intact to light touch  Coordination - No deficits noted    Outcome Measures:    AM-PAC 6 CLICK MOBILITY  Turning over in bed (including adjusting bedclothes, sheets and blankets)?: 3  Sitting down on and standing up from a chair with arms (e.g., wheelchair, bedside commode, etc.): 3  Moving from lying on back to sitting on the side of the bed?: 3  Moving to and from a bed to a chair (including a wheelchair)?: 3  Need to walk in hospital room?: 3  Climbing 3-5 steps with a railing?: 2  Basic Mobility Total Score: " 17     Functional Mobility:    Bed Mobility:   Pt found sitting bedside/returned to bedside chair    Sitting Balance at Edge of Bed:  Assistance Level Required: [place]    Transfers:   Sit <> Stand Transfer: contact guard assistance with hand-held assist  Stand <> Sit Transfer: contact guard assistance with hand-held assist  [id number] from EOB and [id number] from toliet    Standing Balance:  Assistance Level Required: Contact Guard Assistance  Patient used: hand-held assist  Time: 5 min x 2  Postural deviations noted: no deviations noted     Gait:   Patient ambulated: 12ft x 2   Patient required: minimal assist  Patient used:  hand-held assist  Gait Pattern observed: swing-to gait  Gait Deviation(s): decreased step length, narrow base of support, and decreased karyna  Impairments due to: impaired balance and pain  all lines remained intact throughout ambulation trial    Education:  Time provided for education, counseling and discussion of health disposition in regards to patient's current status  All questions answered within PT scope of practice and to patient's satisfaction  PT role in POC to address current functional deficits  Pt educated on proper body mechanics, safety techniques, and energy conservation with PT facilitation and cueing throughout session    Patient left up in chair with all lines intact, call button in reach, and  present.    GOALS:   Multidisciplinary Problems       Physical Therapy Goals          Problem: Physical Therapy    Goal Priority Disciplines Outcome Goal Variances Interventions   Physical Therapy Goal     PT, PT/OT Ongoing, Progressing     Description: Goals to met by 7/7/2023    1. Supine to sit with Modified [place]  2. Sit to supine with Modified [place]  3. Rolling to Left and Right with Modified [place].  4. Sit to stand transfer with Stand-by Assistance  5. Bed to chair transfer with Stand-by Assistance using Rolling Walker  6. Gait  x 150 feet with  Contact Guard Assistance using LRAD   7. Ascend/descend 5 stair(s) with right Handrails Contact Guard Assistance using No Assistive Device.   8. Lower extremity exercise program x15 reps per Instruction, with assistance as needed in order to facilitate improved strength, improved postural control, and improvement in functional independence                         History:     Past Medical History:   Diagnosis Date    Acid reflux     Allergy     Anemia     Asthma     Coronary artery disease     CS (cervical spondylosis) 03/08/2013    Degenerative disc disease     Degenerative joint disease of hindfoot, left 6/28/2022    Dry eyes     Dry mouth     Gastroparesis     History of methotrexate therapy 01/19/2022    Hyperlipidemia     Lateral meniscus derangement 04/06/2016    Lobular carcinoma in situ     Lumbar spondylosis 03/08/2013    Osteoarthritis     Osteoporosis     Pes planovalgus, acquired, left 6/28/2022    Rheumatoid arthritis(714.0)     Rupture of left triceps tendon 10/17/2018    Umbilical hernia 08/13/2015       Past Surgical History:   Procedure Laterality Date    BREAST BIOPSY Left 01/29/2002    core bx    CARPAL TUNNEL RELEASE Right 05/2017    CHOLECYSTECTOMY  2004    COLONOSCOPY      10/11    COLONOSCOPY N/A 6/29/2017    Procedure: COLONOSCOPY;  Surgeon: López Moore MD;  Location: [facility] ENDO ([address] [place] [address]);  Service: Endoscopy;  Laterality: N/A;    EPIDURAL STEROID INJECTION N/A 11/18/2021    Procedure: INJECTION, STEROID, EPIDURAL, L5-S1 IL NEED CONSENT;  Surgeon: Tj Mayfield MD;  Location: [facility] PAIN MGT;  Service: Pain Management;  Laterality: N/A;    EPIDURAL STEROID INJECTION N/A 9/29/2022    Procedure: LUMBAR L3/L4 IL MADELINE CONTRAST;  Surgeon: Tj Mayfield MD;  Location: [facility] PAIN MGT;  Service: Pain Management;  Laterality: N/A;    EPIDURAL STEROID INJECTION N/A 12/12/2022    Procedure: INJECTION, STEROID, EPIDURAL L5/S1 IL CONTRAST;  Surgeon: Tj Mayfield MD;  Location: [facility] PAIN MGT;  Service: Pain  Management;  Laterality: N/A;    EPIDURAL STEROID INJECTION N/A 4/6/2023    Procedure: INJECTION, STEROID, EPIDURAL L5/S1;  Surgeon: Tj Mayfield MD;  Location: [facility] PAIN MGT;  Service: Pain Management;  Laterality: N/A;    FOOT ARTHRODESIS Left 1/11/2023    Procedure: FUSION, FOOT;  Surgeon: Ariella Finnegan MD;  Location: [facility] OR;  Service: Orthopedics;  Laterality: Left;  nimbus    FUSION, SPINE, LUMBAR, TLIF, POSTERIOR APPROACH, USING PEDICLE SCREW N/A 6/22/2023    Procedure: FUSION, SPINE, LUMBAR, TLIF, POSTERIOR APPROACH, USING PEDICLE SCREW L4/5 spinewave SNS:SSEP/EMG;  Surgeon: Jh Mosqueda MD;  Location: [address];  Service: Neurosurgery;  Laterality: N/A;    HARVESTING OF BONE GRAFT Left 1/11/2023    Procedure: SURGICAL PROCUREMENT, BONE GRAFT;  Surgeon: Ariella Finnegan MD;  Location: [facility] OR;  Service: Orthopedics;  Laterality: Left;    INJECTION OF ANESTHETIC AGENT AROUND NERVE Bilateral 8/23/2021    Procedure: BLOCK, NERVE, MEDIAL BRANCH L3,L4,L5;  Surgeon: Tj Mayfield MD;  Location: [facility] PAIN MGT;  Service: Pain Management;  Laterality: Bilateral;  1 of 2    INJECTION OF ANESTHETIC AGENT AROUND NERVE Bilateral 8/26/2021    Procedure: BLOCK, NERVE, MEDIAL BRANCH L3,L4,L5;  Surgeon: Tj Mayfield MD;  Location: [facility] PAIN MGT;  Service: Pain Management;  Laterality: Bilateral;  2 of 2    INJECTION OF ANESTHETIC AGENT AROUND NERVE Left 7/7/2022    Procedure: BLOCK, NERVE, LEFT OBTURATOR AND FEMORAL;  Surgeon: Tj Mayfield MD;  Location: [facility] PAIN MGT;  Service: Pain Management;  Laterality: Left;    INJECTION OF FACET JOINT Bilateral 3/12/2020    Procedure: FACET JOINT INJECTION (LUMBAR BLOCK) BILATERAL L4-5 AND L5-S1 DIRECT REFERRAL;  Surgeon: Tj Mayfield MD;  Location: [facility] PAIN MGT;  Service: Pain Management;  Laterality: Bilateral;  NEEDS CONSENT    INJECTION OF FACET JOINT Bilateral 3/29/2021    Procedure: INJECTION, FACET JOINT L3/4, L4/5, L5/S1;  Surgeon: Tj Mayfield MD;  Location: [facility]  PAIN MGT;  Service: Pain Management;  Laterality: Bilateral;    INJECTION OF JOINT Right 7/30/2020    Procedure: INJECTION, JOINT, RIGHT HIP Interarticular under flouro;  Surgeon: Tj Mayfield MD;  Location: [facility] PAIN MGT;  Service: Pain Management;  Laterality: Right;  INJECTION, JOINT, RIGHT HIP Interarticular under flouro    INJECTION OF JOINT Right 2/21/2022    Procedure: Injection, Joint RIGHT ISCHIAL BURSA;  Surgeon: Tj Mayfield MD;  Location: [facility] PAIN MGT;  Service: Pain Management;  Laterality: Right;    INTRAMEDULLARY RODDING OF FEMUR Left 5/21/2019    Procedure: INSERTION, INTRAMEDULLARY ARIEL, FEMUR;  Surgeon: López Duff MD;  [address]  Service: Orthopedics;  Laterality: Left;    LENGTHENING OF TENDON OF LOWER EXTREMITY Left 1/11/2023    Procedure: LENGTHENING, TENDON, LOWER EXTREMITY;  Surgeon: Ariella Finnegan MD;  Location: [facility] OR;  Service: Orthopedics;  Laterality: Left;    MAGNETIC RESONANCE IMAGING N/A 5/29/2023    Procedure: MRI (Magnetic Resonance Imagine);  Surgeon: Sujey Surgeon;  Location: [facility];  Service: Anesthesiology;  Laterality: N/A;    RADIOFREQUENCY ABLATION Left 9/20/2021    Procedure: RADIOFREQUENCY ABLATION left L3,4,5 RFA  1 of 2  CONSENT NEEDED;  Surgeon: Tj Mayfield MD;  Location: [facility] PAIN MGT;  Service: Pain Management;  Laterality: Left;    RADIOFREQUENCY ABLATION Right 10/4/2021    Procedure: RADIOFREQUENCY ABLATION  right L3,4,5 RFA   2 of 2  CONSENT NEEDED;  Surgeon: Tj Mayfield MD;  Location: [facility] PAIN MGT;  Service: Pain Management;  Laterality: Right;    RADIOFREQUENCY ABLATION Left 4/21/2022    Procedure: Radiofrequency Ablation LEFT L3,L4,L5;  Surgeon: Tj Mayfield MD;  Location: [facility] PAIN MGT;  Service: Pain Management;  Laterality: Left;    RADIOFREQUENCY ABLATION Right 5/12/2022    Procedure: Radiofrequency Ablation RIGHT L3,L4,L5;  Surgeon: Tj Mayfield MD;  Location: [facility] PAIN MGT;  Service: Pain Management;  Laterality: Right;     RADIOFREQUENCY ABLATION Left 7/25/2022    Procedure: Radiofrequency Ablation Left Femoral & Obturator;  Surgeon: Tj Mayfield MD;  Location: [facility] PAIN MGT;  Service: Pain Management;  Laterality: Left;    REPAIR OF TRICEPS TENDON Left 10/17/2018    Procedure: REPAIR, TENDON, TRICEPS left elbow;  Surgeon: Staci Yarbrough MD;  [facility];  Service: Orthopedics;  Laterality: Left;  Anesthesia: General and regional. PRONE, k-wire , hand pan 1 and pan 2, CALL ARTHREX/Tamera notified 10-12 LO    TONSILLECTOMY      TRANSFORAMINAL EPIDURAL INJECTION OF STEROID Right 8/31/2020    Procedure: INJECTION, STEROID, EPIDURAL, TRANSFORAMINAL,  APPROACH, L3-L4 and L4-L5 need consent;  Surgeon: Tj Mayfield MD;  Location: [facility] PAIN [facility]GT;  Service: Pain Management;  Laterality: Right;    TRANSFORAMINAL EPIDURAL INJECTION OF STEROID Bilateral 7/23/2021    Procedure: INJECTION, STEROID, EPIDURAL, TRANSFORAMINAL APPROACH L4/5;  Surgeon: Tj Mayfield MD;  Location: [facility] PAIN [facility]GT;  Service: Pain Management;  Laterality: Bilateral;    TRIGGER POINT INJECTION N/A 7/23/2021    Procedure: INJECTION, TRIGGER POINT SCAPULAR;  Surgeon: Tj Mayfield MD;  Location: [facility] PAIN MGT;  Service: Pain Management;  Laterality: N/A;    TUBAL LIGATION  2003    UPPER GASTROINTESTINAL ENDOSCOPY      10/11    uterine ablation  2003       Time Tracking:     PT Received On: 06/23/23  PT Start Time: 0953     PT Stop Time: 1019  PT Total Time (min): 26 min     Billable Minutes: Evaluation 1 procedure and Gait Training 12 min    Tam Morris, PT, DPT  6/23/2023

## 2023-06-23 NOTE — ANESTHESIA POSTPROCEDURE EVALUATION
Anesthesia Post Evaluation    Patient: Joyce Peterson    Procedure(s) Performed: Procedure(s) (LRB):  FUSION, SPINE, LUMBAR, TLIF, POSTERIOR APPROACH, USING PEDICLE SCREW L4/5 spinewave SNS:SSEP/EMG (N/A)    Final Anesthesia Type: general      Patient location during evaluation: PACU  Patient participation: Yes- Able to Participate  Level of consciousness: awake and alert  Post-procedure vital signs: reviewed and stable  Pain management: adequate  Airway patency: patent    PONV status at discharge: No PONV  Anesthetic complications: no      Cardiovascular status: hemodynamically stable  Respiratory status: unassisted  Follow-up not needed.            Event Time   Out of Recovery 16:30:00         Pain/Isacc Score: Pain Rating Prior to Med Admin: 8 (6/23/2023  8:17 AM)  Isacc Score: 9 (6/22/2023  4:30 PM)         Attending Attestation (For Attendings USE Only)...

## 2023-06-23 NOTE — SUBJECTIVE & OBJECTIVE
Past Medical History:   Diagnosis Date    Acid reflux     Allergy     Anemia     Asthma     Coronary artery disease     CS (cervical spondylosis) 03/08/2013    Degenerative disc disease     Degenerative joint disease of hindfoot, left 6/28/2022    Dry eyes     Dry mouth     Gastroparesis     History of methotrexate therapy 01/19/2022    Hyperlipidemia     Lateral meniscus derangement 04/06/2016    Lobular carcinoma in situ     Lumbar spondylosis 03/08/2013    Osteoarthritis     Osteoporosis     Pes planovalgus, acquired, left 6/28/2022    Rheumatoid arthritis(714.0)     Rupture of left triceps tendon 10/17/2018    Umbilical hernia 08/13/2015       Past Surgical History:   Procedure Laterality Date    BREAST BIOPSY Left 01/29/2002    core bx    CARPAL TUNNEL RELEASE Right 05/2017    CHOLECYSTECTOMY  2004    COLONOSCOPY      10/11    COLONOSCOPY N/A 6/29/2017    Procedure: COLONOSCOPY;  Surgeon: López Moore MD;  [facility]  Service: Endoscopy;  Laterality: N/A;    EPIDURAL STEROID INJECTION N/A 11/18/2021    Procedure: INJECTION, STEROID, EPIDURAL, L5-S1 IL NEED CONSENT;  Surgeon: Tj Mayfield MD;  Location: [facility] PAIN MGT;  Service: Pain Management;  Laterality: N/A;    EPIDURAL STEROID INJECTION N/A 9/29/2022    Procedure: LUMBAR L3/L4 IL MADELINE CONTRAST;  Surgeon: Tj Mayfield MD;  Location: [facility] PAIN MGT;  Service: Pain Management;  Laterality: N/A;    EPIDURAL STEROID INJECTION N/A 12/12/2022    Procedure: INJECTION, STEROID, EPIDURAL L5/S1 IL CONTRAST;  Surgeon: Tj Mayfield MD;  Location: [facility] PAIN MGT;  Service: Pain Management;  Laterality: N/A;    EPIDURAL STEROID INJECTION N/A 4/6/2023    Procedure: INJECTION, STEROID, EPIDURAL L5/S1;  Surgeon: Tj Mayfield MD;  Location: [facility] PAIN MGT;  Service: Pain Management;  Laterality: N/A;    FOOT ARTHRODESIS Left 1/11/2023    Procedure: FUSION, FOOT;  Surgeon: Ariella Finnegan MD;  Location: [facility] OR;  Service: Orthopedics;  Laterality: Left;   nimbus    FUSION, SPINE, LUMBAR, TLIF, POSTERIOR APPROACH, USING PEDICLE SCREW N/A 6/22/2023    Procedure: FUSION, SPINE, LUMBAR, TLIF, POSTERIOR APPROACH, USING PEDICLE SCREW L4/5 spinewave SNS:SSEP/EMG;  Surgeon: Jh Mosqueda MD;  [facility];  Service: Neurosurgery;  Laterality: N/A;    HARVESTING OF BONE GRAFT Left 1/11/2023    Procedure: SURGICAL PROCUREMENT, BONE GRAFT;  Surgeon: Ariella Finnegan MD;  Location: [facility] OR;  Service: Orthopedics;  Laterality: Left;    INJECTION OF ANESTHETIC AGENT AROUND NERVE Bilateral 8/23/2021    Procedure: BLOCK, NERVE, MEDIAL BRANCH L3,L4,L5;  Surgeon: Tj Mayfield MD;  Location: [facility] PAIN MGT;  Service: Pain Management;  Laterality: Bilateral;  1 of 2    INJECTION OF ANESTHETIC AGENT AROUND NERVE Bilateral 8/26/2021    Procedure: BLOCK, NERVE, MEDIAL BRANCH L3,L4,L5;  Surgeon: Tj Mayfield MD;  Location: [facility] PAIN MGT;  Service: Pain Management;  Laterality: Bilateral;  2 of 2    INJECTION OF ANESTHETIC AGENT AROUND NERVE Left 7/7/2022    Procedure: BLOCK, NERVE, LEFT OBTURATOR AND FEMORAL;  Surgeon: Tj Mayfield MD;  Location: [facility] PAIN MGT;  Service: Pain Management;  Laterality: Left;    INJECTION OF FACET JOINT Bilateral 3/12/2020    Procedure: FACET JOINT INJECTION (LUMBAR BLOCK) BILATERAL L4-5 AND L5-S1 DIRECT REFERRAL;  Surgeon: Tj Mayfield MD;  Location: [facility] PAIN MGT;  Service: Pain Management;  Laterality: Bilateral;  NEEDS CONSENT    INJECTION OF FACET JOINT Bilateral 3/29/2021    Procedure: INJECTION, FACET JOINT L3/4, L4/5, L5/S1;  Surgeon: Tj Mayfield MD;  Location: [facility] PAIN MGT;  Service: Pain Management;  Laterality: Bilateral;    INJECTION OF JOINT Right 7/30/2020    Procedure: INJECTION, JOINT, RIGHT HIP Interarticular under flouro;  Surgeon: Tj Mayfield MD;  Location: [facility] PAIN [facility]GT;  Service: Pain Management;  Laterality: Right;  INJECTION, JOINT, RIGHT HIP Interarticular under flouro    INJECTION OF JOINT Right 2/21/2022    Procedure:  Injection, Joint RIGHT ISCHIAL BURSA;  Surgeon: Tj Mayfield MD;  Location: [facility] PAIN MGT;  Service: Pain Management;  Laterality: Right;    INTRAMEDULLARY RODDING OF FEMUR Left 5/21/2019    Procedure: INSERTION, INTRAMEDULLARY ARIEL, FEMUR;  Surgeon: López Duff MD;  Location: [facility] OR 2ND FLR;  Service: Orthopedics;  Laterality: Left;    LENGTHENING OF TENDON OF LOWER EXTREMITY Left 1/11/2023    Procedure: LENGTHENING, TENDON, LOWER EXTREMITY;  Surgeon: Ariella Finnegan MD;  Location: [facility] OR;  Service: Orthopedics;  Laterality: Left;    MAGNETIC RESONANCE IMAGING N/A 5/29/2023    Procedure: MRI (Magnetic Resonance Imagine);  Surgeon: Sujey Robin;  Location: [facility] SUJEY;  Service: Anesthesiology;  Laterality: N/A;    RADIOFREQUENCY ABLATION Left 9/20/2021    Procedure: RADIOFREQUENCY ABLATION left L3,4,5 RFA  1 of 2  CONSENT NEEDED;  Surgeon: Tj Mayfield MD;  Location: [facility] PAIN MGT;  Service: Pain Management;  Laterality: Left;    RADIOFREQUENCY ABLATION Right 10/4/2021    Procedure: RADIOFREQUENCY ABLATION  right L3,4,5 RFA   2 of 2  CONSENT NEEDED;  Surgeon: Tj Mayfield MD;  Location: [facility] PAIN MGT;  Service: Pain Management;  Laterality: Right;    RADIOFREQUENCY ABLATION Left 4/21/2022    Procedure: Radiofrequency Ablation LEFT L3,L4,L5;  Surgeon: Tj Mayfield MD;  Location: [facility] PAIN MGT;  Service: Pain Management;  Laterality: Left;    RADIOFREQUENCY ABLATION Right 5/12/2022    Procedure: Radiofrequency Ablation RIGHT L3,L4,L5;  Surgeon: Tj Mayfield MD;  Location: [facility] PAIN MGT;  Service: Pain Management;  Laterality: Right;    RADIOFREQUENCY ABLATION Left 7/25/2022    Procedure: Radiofrequency Ablation Left Femoral & Obturator;  Surgeon: Tj Mayfield MD;  Location: [facility]IN [facility]GT;  Service: Pain Management;  Laterality: Left;    REPAIR OF TRICEPS TENDON Left 10/17/2018    Procedure: REPAIR, TENDON, TRICEPS left elbow;  Surgeon: Staci Yarbrough MD;  Location: [facility] OR 1ST FLR;  Service: Orthopedics;   "Laterality: Left;  Anesthesia: General and regional. PRONE, k-wire , hand pan 1 and pan 2, CALL ARTHREX/Tamera notified 10-12 LO    TONSILLECTOMY      TRANSFORAMINAL EPIDURAL INJECTION OF STEROID Right 8/31/2020    Procedure: INJECTION, STEROID, EPIDURAL, TRANSFORAMINAL,  APPROACH, L3-L4 and L4-L5 need consent;  Surgeon: Tj Mayfield MD;  Location: [facility] PAIN MGT;  Service: Pain Management;  Laterality: Right;    TRANSFORAMINAL EPIDURAL INJECTION OF STEROID Bilateral 7/23/2021    Procedure: INJECTION, STEROID, EPIDURAL, TRANSFORAMINAL APPROACH L4/5;  Surgeon: Tj Mayfield MD;  Location: [facility]IN [facility]GT;  Service: Pain Management;  Laterality: Bilateral;    TRIGGER POINT INJECTION N/A 7/23/2021    Procedure: INJECTION, TRIGGER POINT SCAPULAR;  Surgeon: Tj Mayfield MD;  Location: [facility] PAIN MGT;  Service: Pain Management;  Laterality: N/A;    TUBAL LIGATION  2003    UPPER GASTROINTESTINAL ENDOSCOPY      10/11    uterine ablation  2003       Review of patient's allergies indicates:   Allergen Reactions    Actemra [tocilizumab] Other (See Comments)     Severe dizziness    Codeine Nausea And Vomiting and Hives    Gold au 198 Hives and Rash    Hydroxychloroquine Other (See Comments)     Can't remember the reaction      Iodinated contrast media Other (See Comments)     Other reaction(s): BURNING ALL OVER    Iodine Other (See Comments) and Hives     Other reaction(s): BURNING ALL OVER - Iodine dye - Not topical    Ondansetron Nausea And Vomiting    Sulfa (sulfonamide antibiotics) Other (See Comments)     Can't remember the reaction    Zofran [ondansetron hcl (pf)] Nausea And Vomiting     Pt reports that last time she received zofran she started vomiting again    Methotrexate analogues Nausea Only    Pneumococcal vaccine Other (See Comments)    Pneumovax 23 [pneumococcal 23-argenis ps vaccine] Other (See Comments)     "sick"    Methotrexate Nausea Only       No current facility-administered medications on file prior to " encounter.     Current Outpatient Medications on File Prior to Encounter   Medication Sig    budesonide-formoterol 160-4.5 mcg (SYMBICORT) 160-4.5 mcg/actuation HFAA Inhale 2 puffs into the lungs every 12 (twelve) hours.    INV PROPULSID 10 MG Take 2 tablets (20 mg) by mouth 4 (four) times daily. FOR INVESTIGATIONAL USE ONLY. Protocol [id number]  Subject ID: [id number].    lifitegrast (XIIDRA) 5 % Dpet INSTILL ONE DROP INTO BOTH EYES TWICE A DAY    omeprazole (PRILOSEC) 40 MG capsule Take 1 capsule (40 mg total) by mouth every morning.    predniSONE (DELTASONE) 5 MG tablet Take 1 tablet (5 mg total) by mouth once daily.    pregabalin (LYRICA) 100 MG capsule Take 1 capsule (100 mg total) by mouth 3 (three) times daily.    rosuvastatin (CRESTOR) 20 MG tablet Take 1 tablet (20 mg total) by mouth every evening.    sucralfate (CARAFATE) 1 gram tablet TAKE 1 TABLET (1 G TOTAL) BY MOUTH 4 (FOUR) TIMES DAILY.    albuterol (PROVENTIL/VENTOLIN HFA) 90 mcg/actuation inhaler Inhale 2 puffs into the lungs every 6 (six) hours as needed. Rescue    albuterol-ipratropium (DUO-NEB) 2.5 mg-0.5 mg/3 mL nebulizer solution Take 3 mLs by nebulization every 6 (six) hours as needed for Wheezing. Rescue    aspirin 325 MG tablet Take 1 tablet (325 mg total) by mouth once daily. (Patient not taking: Reported on 5/26/2023)    calcium citrate-vitamin D3 315-200 mg (CITRACAL+D) 315 mg-5 mcg (200 unit) per tablet Take 1 tablet by mouth 2 (two) times daily.     diclofenac sodium (VOLTAREN) 1 % Gel APPLY 2 GRAMS TOPICALLY 4 (FOUR) TIMES DAILY AS NEEDED.    fluconazole (DIFLUCAN) 150 MG Tab Take 150 mg by mouth as needed.    furosemide (LASIX) 20 MG tablet Take 1 tablet (20 mg total) by mouth once daily. (Patient not taking: Reported on 6/13/2023)    halobetasol propion-tazarotene (DUOBRII) 0.01-0.045 % Lotn aaa on feet and hands qhs  then vaseline and warm towel (Patient not taking: Reported on 6/13/2023)    ketoconazole (NIZORAL) 2 % cream Apply to  feet twice daily for 3 weeks (Patient not taking: Reported on 6/13/2023)    magic mouthwash diphen/antac/lidoc Swish and Spit 5 mL by mouth for 30 seconds twice daily. (Patient not taking: Reported on 6/13/2023)    methocarbamoL (ROBAXIN) 750 MG Tab Take 1 tablet (750 mg total) by mouth 3 (three) times daily.    naproxen (NAPROSYN) 500 MG tablet Take 1 tablet (500 mg total) by mouth 2 (two) times daily as needed (prn).    potassium chloride SA (K-DUR,KLOR-CON) 20 MEQ tablet Take 1 tablet (20 mEq total) by mouth once daily. Take with lasix    POTASSIUM ORAL Take by mouth once daily.    predniSONE (DELTASONE) 1 MG tablet TAKE 5 TABLETS (5 MG TOTAL) BY MOUTH ONCE DAILY. (Patient taking differently: Take 5 mg by mouth once daily. Pt takes a total of 6 mg every morning.)    PREMARIN vaginal cream PLACE 0.5 GRAM VAGINALLY 3 (THREE) TIMES A WEEK.    promethazine (PHENERGAN) 25 MG tablet Take 1 tablet (25 mg total) by mouth every 6 (six) hours as needed for Nausea.    sucralfate (CARAFATE) 1 gram tablet Take 1 tablet (1 g total) by mouth 4 (four) times daily.     Family History       Problem Relation (Age of Onset)    Alcohol abuse Mother, Father, Brother    Cancer Mother, Father    Cirrhosis Brother    Emphysema Mother    Heart attack Mother, Brother    Heart disease Brother    Lung cancer Father    No Known Problems Sister, Maternal Uncle, Paternal Uncle, Maternal Grandmother, Maternal Grandfather, Paternal Grandmother, Paternal Grandfather    Osteoarthritis Father, Paternal Aunt    Retinal detachment Maternal Aunt    Skin cancer Father          Tobacco Use    Smoking status: Never    Smokeless tobacco: Never   Substance and Sexual Activity    Alcohol use: Yes     Comment: 2-3 times per year.    Drug use: No    Sexual activity: Yes     Partners: Male     Birth control/protection: Surgical     Review of Systems   Constitutional:  Negative for chills and fever.   Eyes:  Negative for visual disturbance.   Respiratory:   Positive for cough. Negative for shortness of breath.    Cardiovascular:  Positive for leg swelling. Negative for chest pain.   Genitourinary:  Negative for dysuria.   Musculoskeletal:  Positive for back pain.   Objective:     Vital Signs (Most Recent):  Temp: 99.3 °F (37.4 °C) (06/23/23 1112)  Pulse: 87 (06/23/23 1112)  Resp: 18 (06/23/23 1505)  BP: (!) 98/54 (06/23/23 1112)  SpO2: 96 % (06/23/23 1112) Vital Signs (24h Range):  Temp:  [98.6 °F (37 °C)-99.3 °F (37.4 °C)] 99.3 °F (37.4 °C)  Pulse:  [80-92] 87  Resp:  [14-20] 18  SpO2:  [94 %-100 %] 96 %  BP: ()/(53-74) 98/54        There is no height or weight on file to calculate BMI.     Physical Exam  Constitutional:       Appearance: Normal appearance. She is not ill-appearing.   HENT:      Head: Normocephalic and atraumatic.      Mouth/Throat:      Mouth: Mucous membranes are moist.      Pharynx: Oropharynx is clear.   Eyes:      Extraocular Movements: Extraocular movements intact.      Conjunctiva/sclera: Conjunctivae normal.   Neck:      Comments: No JVD appreciated  Cardiovascular:      Rate and Rhythm: Normal rate and regular rhythm.      Pulses: Normal pulses.      Heart sounds: Normal heart sounds.   Pulmonary:      Effort: Pulmonary effort is normal.      Breath sounds: Normal breath sounds.      Comments: Lungs clear to auscultation bilaterally  Abdominal:      General: Bowel sounds are normal.      Palpations: Abdomen is soft.      Tenderness: There is no abdominal tenderness.   Musculoskeletal:      Cervical back: Normal range of motion.      Comments: 1+ BLE pitting edema to level of lower thighs. Some weeping noted to RLE   Skin:     General: Skin is warm and dry.      Capillary Refill: Capillary refill takes less than 2 seconds.   Neurological:      General: No focal deficit present.      Mental Status: She is alert.   Psychiatric:         Mood and Affect: Mood normal.        Significant Labs: All pertinent labs within the past 24 hours have  been reviewed.  CBC:   Recent Labs   Lab 06/23/23  0535   WBC 4.64   HGB 8.4*   HCT 26.6*        CMP:   Recent Labs   Lab 06/22/23  1915 06/23/23  0534    141   K 3.2* 3.4*   * 111*   CO2 20* 26    75   BUN 6* 7*   CREATININE 0.5 0.7   CALCIUM 6.7* 7.5*   PROT 4.7*  --    ALBUMIN 2.8*  --    BILITOT 0.2  --    ALKPHOS 108  --    AST 88*  --    ALT 65*  --    ANIONGAP 8 4*       Significant Imaging: I have reviewed all pertinent imaging results/findings within the past 24 hours.

## 2023-06-23 NOTE — PLAN OF CARE
Problem: Occupational Therapy  Goal: Occupational Therapy Goal  Description: Goals to be met by: 7/14/23     Patient will increase functional independence with ADLs by performing:    UE Dressing with Modified [place].  LE Dressing with Modified [place].  Grooming while standing with Modified [place].  Toileting from toilet/bedside commode with Modified [place] for hygiene and clothing management.   Toilet transfer to toilet/bedside commode with Modified [place] and LRAD as needed.    Outcome: Ongoing, Progressing   OT eval complete with goals established.

## 2023-06-23 NOTE — CONSULTS
[facility] - Surgery  [facility] Medicine  Consult Note    Patient Name: Joyce Peterson  MRN: [id number]  Admission Date: 6/22/2023  Hospital Length of Stay: 1 days  Attending Physician: Jh Mosqueda MD   Primary Care Provider: Krystal Singleton DO           Patient information was obtained from patient, spouse/SO, past medical records, ER records and primary team.     Inpatient consult to Hospital Medicine-Ortho Comanagement Only  Consult performed by: Cindy Cosby MD  Consult ordered by: Guzman Mohan MD        Subjective:     Principal Problem: Lumbar stenosis    Chief Complaint: No chief complaint on file.       HPI: 62 y.o F with RA (on leflunomide and prednisone), Sjogren's, steroid induced osteoporosis, GERD, HLD, CAD, asthma (on symbicort) admitted for L4/L5 TLIF which she completed on 6/22    Medicine consulted for wheezing/crackles. On evaluation, patient in no acute distress. Comfortable, no increasaed WOB, saturating well on room air. She did endorse wheezing earlier that morning after a coughing fit. She has hx of asthma and her home inhaler was not restarted. She has been having > 2 month history of BLE edema that she contributes to sleeping in a recliner (2/2 back pain, not SOB). Denies hx of heart failure. Last TTE in 2020 with no systolic or diastolic dysfunction.       Past Medical History:   Diagnosis Date    Acid reflux     Allergy     Anemia     Asthma     Coronary artery disease     CS (cervical spondylosis) 03/08/2013    Degenerative disc disease     Degenerative joint disease of hindfoot, left 6/28/2022    Dry eyes     Dry mouth     Gastroparesis     History of methotrexate therapy 01/19/2022    Hyperlipidemia     Lateral meniscus derangement 04/06/2016    Lobular carcinoma in situ     Lumbar spondylosis 03/08/2013    Osteoarthritis     Osteoporosis     Pes planovalgus, acquired, left 6/28/2022    Rheumatoid arthritis(714.0)     Rupture of left triceps tendon  10/17/2018    Umbilical hernia 08/13/2015       Past Surgical History:   Procedure Laterality Date    BREAST BIOPSY Left 01/29/2002    core bx    CARPAL TUNNEL RELEASE Right 05/2017    CHOLECYSTECTOMY  2004    COLONOSCOPY      10/11    COLONOSCOPY N/A 6/29/2017    Procedure: COLONOSCOPY;  Surgeon: López Moore MD;  Location: [facility] ENDO (4TH FLR);  Service: Endoscopy;  Laterality: N/A;    EPIDURAL STEROID INJECTION N/A 11/18/2021    Procedure: INJECTION, STEROID, EPIDURAL, L5-S1 IL NEED CONSENT;  Surgeon: Tj Mayfield MD;  Location: [facility] PAIN MGT;  Service: Pain Management;  Laterality: N/A;    EPIDURAL STEROID INJECTION N/A 9/29/2022    Procedure: LUMBAR L3/L4 IL MADELINE CONTRAST;  Surgeon: Tj Mayfield MD;  Location: [facility] PAIN MGT;  Service: Pain Management;  Laterality: N/A;    EPIDURAL STEROID INJECTION N/A 12/12/2022    Procedure: INJECTION, STEROID, EPIDURAL L5/S1 IL CONTRAST;  Surgeon: Tj Mayfield MD;  Location: [facility] PAIN MGT;  Service: Pain Management;  Laterality: N/A;    EPIDURAL STEROID INJECTION N/A 4/6/2023    Procedure: INJECTION, STEROID, EPIDURAL L5/S1;  Surgeon: Tj Mayfield MD;  Location: [facility] PAIN MGT;  Service: Pain Management;  Laterality: N/A;    FOOT ARTHRODESIS Left 1/11/2023    Procedure: FUSION, FOOT;  Surgeon: Ariella Finnegan MD;  Location: [facility] OR;  Service: Orthopedics;  Laterality: Left;  [facility]    FUSION, SPINE, LUMBAR, TLIF, POSTERIOR APPROACH, USING PEDICLE SCREW N/A 6/22/2023    Procedure: FUSION, SPINE, LUMBAR, TLIF, POSTERIOR APPROACH, USING PEDICLE SCREW L4/5 spinewave SNS:SSEP/EMG;  Surgeon: Jh Mosqueda MD;  Location: [facility] OR 2ND FLR;  Service: Neurosurgery;  Laterality: N/A;    HARVESTING OF BONE GRAFT Left 1/11/2023    Procedure: SURGICAL PROCUREMENT, BONE GRAFT;  Surgeon: Ariella Finnegan MD;  Location: [facility] OR;  Service: Orthopedics;  Laterality: Left;    INJECTION OF ANESTHETIC AGENT AROUND NERVE Bilateral 8/23/2021    Procedure: BLOCK, NERVE, MEDIAL BRANCH  L3,L4,L5;  Surgeon: Tj Mayfield MD;  Location: [facility] PAIN MGT;  Service: Pain Management;  Laterality: Bilateral;  1 of 2    INJECTION OF ANESTHETIC AGENT AROUND NERVE Bilateral 8/26/2021    Procedure: BLOCK, NERVE, MEDIAL BRANCH L3,L4,L5;  Surgeon: Tj Mayfield MD;  Location: [facility] PAIN MGT;  Service: Pain Management;  Laterality: Bilateral;  2 of 2    INJECTION OF ANESTHETIC AGENT AROUND NERVE Left 7/7/2022    Procedure: BLOCK, NERVE, LEFT OBTURATOR AND FEMORAL;  Surgeon: Tj Mayfield MD;  Location: [facility] PAIN MGT;  Service: Pain Management;  Laterality: Left;    INJECTION OF FACET JOINT Bilateral 3/12/2020    Procedure: FACET JOINT INJECTION (LUMBAR BLOCK) BILATERAL L4-5 AND L5-S1 DIRECT REFERRAL;  Surgeon: Tj Mayfield MD;  Location: [facility] PAIN MGT;  Service: Pain Management;  Laterality: Bilateral;  NEEDS CONSENT    INJECTION OF FACET JOINT Bilateral 3/29/2021    Procedure: INJECTION, FACET JOINT L3/4, L4/5, L5/S1;  Surgeon: Tj Mayfield MD;  Location: [facility] PAIN MGT;  Service: Pain Management;  Laterality: Bilateral;    INJECTION OF JOINT Right 7/30/2020    Procedure: INJECTION, JOINT, RIGHT HIP Interarticular under flouro;  Surgeon: Tj Mayfield MD;  Location: [facility] PAIN MGT;  Service: Pain Management;  Laterality: Right;  INJECTION, JOINT, RIGHT HIP Interarticular under flouro    INJECTION OF JOINT Right 2/21/2022    Procedure: Injection, Joint RIGHT ISCHIAL BURSA;  Surgeon: Tj Mayfield MD;  Location: [facility] PAIN MGT;  Service: Pain Management;  Laterality: Right;    INTRAMEDULLARY RODDING OF FEMUR Left 5/21/2019    Procedure: INSERTION, INTRAMEDULLARY ARIEL, FEMUR;  Surgeon: López Duff MD;  [address]  Service: Orthopedics;  Laterality: Left;    LENGTHENING OF TENDON OF LOWER EXTREMITY Left 1/11/2023    Procedure: LENGTHENING, TENDON, LOWER EXTREMITY;  Surgeon: Ariella Finnegan MD;  Location: [facility] OR;  Service: Orthopedics;  Laterality: Left;    MAGNETIC RESONANCE IMAGING N/A 5/29/2023     Procedure: MRI (Magnetic Resonance Imagine);  Surgeon: Sujey Surgeon;  Location: [facility] SUJEY;  Service: Anesthesiology;  Laterality: N/A;    RADIOFREQUENCY ABLATION Left 9/20/2021    Procedure: RADIOFREQUENCY ABLATION left L3,4,5 RFA  1 of 2  CONSENT NEEDED;  Surgeon: Tj Mayfield MD;  Location: [facility] PAIN MGT;  Service: Pain Management;  Laterality: Left;    RADIOFREQUENCY ABLATION Right 10/4/2021    Procedure: RADIOFREQUENCY ABLATION  right L3,4,5 RFA   2 of 2  CONSENT NEEDED;  Surgeon: Tj Mayfield MD;  Location: [facility] PAIN MGT;  Service: Pain Management;  Laterality: Right;    RADIOFREQUENCY ABLATION Left 4/21/2022    Procedure: Radiofrequency Ablation LEFT L3,L4,L5;  Surgeon: Tj Mayfield MD;  Location: [facility] PAIN MGT;  Service: Pain Management;  Laterality: Left;    RADIOFREQUENCY ABLATION Right 5/12/2022    Procedure: Radiofrequency Ablation RIGHT L3,L4,L5;  Surgeon: Tj Mayfield MD;  Location: [facility] PAIN MGT;  Service: Pain Management;  Laterality: Right;    RADIOFREQUENCY ABLATION Left 7/25/2022    Procedure: Radiofrequency Ablation Left Femoral & Obturator;  Surgeon: Tj Mayfield MD;  Location: [facility] PAIN MGT;  Service: Pain Management;  Laterality: Left;    REPAIR OF TRICEPS TENDON Left 10/17/2018    Procedure: REPAIR, TENDON, TRICEPS left elbow;  Surgeon: Staci Yarbrough MD;  Location: [facility] OR [address] [place] [address];  Service: Orthopedics;  Laterality: Left;  Anesthesia: General and regional. PRONE, k-wire , hand pan 1 and pan 2, CALL ARTHREX/Tamera notified 10-12 LO    TONSILLECTOMY      TRANSFORAMINAL EPIDURAL INJECTION OF STEROID Right 8/31/2020    Procedure: INJECTION, STEROID, EPIDURAL, TRANSFORAMINAL,  APPROACH, L3-L4 and L4-L5 need consent;  Surgeon: Tj Mayfield MD;  Location: [facility] PAIN MGT;  Service: Pain Management;  Laterality: Right;    TRANSFORAMINAL EPIDURAL INJECTION OF STEROID Bilateral 7/23/2021    Procedure: INJECTION, STEROID, EPIDURAL, TRANSFORAMINAL APPROACH L4/5;  Surgeon: Tj  "MD Tran;  Location: [facility] PAIN MGT;  Service: Pain Management;  Laterality: Bilateral;    TRIGGER POINT INJECTION N/A 7/23/2021    Procedure: INJECTION, TRIGGER POINT SCAPULAR;  Surgeon: Tj Mayfield MD;  Location: [facility] PAIN MGT;  Service: Pain Management;  Laterality: N/A;    TUBAL LIGATION  2003    UPPER GASTROINTESTINAL ENDOSCOPY      10/11    uterine ablation  2003       Review of patient's allergies indicates:   Allergen Reactions    Actemra [tocilizumab] Other (See Comments)     Severe dizziness    Codeine Nausea And Vomiting and Hives    Gold au 198 Hives and Rash    Hydroxychloroquine Other (See Comments)     Can't remember the reaction      Iodinated contrast media Other (See Comments)     Other reaction(s): BURNING ALL OVER    Iodine Other (See Comments) and Hives     Other reaction(s): BURNING ALL OVER - Iodine dye - Not topical    Ondansetron Nausea And Vomiting    Sulfa (sulfonamide antibiotics) Other (See Comments)     Can't remember the reaction    Zofran [ondansetron hcl (pf)] Nausea And Vomiting     Pt reports that last time she received zofran she started vomiting again    Methotrexate analogues Nausea Only    Pneumococcal vaccine Other (See Comments)    Pneumovax 23 [pneumococcal 23-argenis ps vaccine] Other (See Comments)     "sick"    Methotrexate Nausea Only       No current facility-administered medications on file prior to encounter.     Current Outpatient Medications on File Prior to Encounter   Medication Sig    budesonide-formoterol 160-4.5 mcg (SYMBICORT) 160-4.5 mcg/actuation HFAA Inhale 2 puffs into the lungs every 12 (twelve) hours.    INV PROPULSID 10 MG Take 2 tablets (20 mg) by mouth 4 (four) times daily. FOR INVESTIGATIONAL USE ONLY. Protocol [id number]  Subject ID: [id number].    lifitegrast (XIIDRA) 5 % Dpet INSTILL ONE DROP INTO BOTH EYES TWICE A DAY    omeprazole (PRILOSEC) 40 MG capsule Take 1 capsule (40 mg total) by mouth every morning.    predniSONE " (DELTASONE) 5 MG tablet Take 1 tablet (5 mg total) by mouth once daily.    pregabalin (LYRICA) 100 MG capsule Take 1 capsule (100 mg total) by mouth 3 (three) times daily.    rosuvastatin (CRESTOR) 20 MG tablet Take 1 tablet (20 mg total) by mouth every evening.    sucralfate (CARAFATE) 1 gram tablet TAKE 1 TABLET (1 G TOTAL) BY MOUTH 4 (FOUR) TIMES DAILY.    albuterol (PROVENTIL/VENTOLIN HFA) 90 mcg/actuation inhaler Inhale 2 puffs into the lungs every 6 (six) hours as needed. Rescue    albuterol-ipratropium (DUO-NEB) 2.5 mg-0.5 mg/3 mL nebulizer solution Take 3 mLs by nebulization every 6 (six) hours as needed for Wheezing. Rescue    aspirin 325 MG tablet Take 1 tablet (325 mg total) by mouth once daily. (Patient not taking: Reported on 5/26/2023)    calcium citrate-vitamin D3 315-200 mg (CITRACAL+D) 315 mg-5 mcg (200 unit) per tablet Take 1 tablet by mouth 2 (two) times daily.     diclofenac sodium (VOLTAREN) 1 % Gel APPLY 2 GRAMS TOPICALLY 4 (FOUR) TIMES DAILY AS NEEDED.    fluconazole (DIFLUCAN) 150 MG Tab Take 150 mg by mouth as needed.    furosemide (LASIX) 20 MG tablet Take 1 tablet (20 mg total) by mouth once daily. (Patient not taking: Reported on 6/13/2023)    halobetasol propion-tazarotene (DUOBRII) 0.01-0.045 % Lotn aaa on feet and hands qhs  then vaseline and warm towel (Patient not taking: Reported on 6/13/2023)    ketoconazole (NIZORAL) 2 % cream Apply to feet twice daily for 3 weeks (Patient not taking: Reported on 6/13/2023)    magic mouthwash diphen/antac/lidoc Swish and Spit 5 mL by mouth for 30 seconds twice daily. (Patient not taking: Reported on 6/13/2023)    methocarbamoL (ROBAXIN) 750 MG Tab Take 1 tablet (750 mg total) by mouth 3 (three) times daily.    naproxen (NAPROSYN) 500 MG tablet Take 1 tablet (500 mg total) by mouth 2 (two) times daily as needed (prn).    potassium chloride SA (K-DUR,KLOR-CON) 20 MEQ tablet Take 1 tablet (20 mEq total) by mouth once daily. Take with  lasix    POTASSIUM ORAL Take by mouth once daily.    predniSONE (DELTASONE) 1 MG tablet TAKE 5 TABLETS (5 MG TOTAL) BY MOUTH ONCE DAILY. (Patient taking differently: Take 5 mg by mouth once daily. Pt takes a total of 6 mg every morning.)    PREMARIN vaginal cream PLACE 0.5 GRAM VAGINALLY 3 (THREE) TIMES A WEEK.    promethazine (PHENERGAN) 25 MG tablet Take 1 tablet (25 mg total) by mouth every 6 (six) hours as needed for Nausea.    sucralfate (CARAFATE) 1 gram tablet Take 1 tablet (1 g total) by mouth 4 (four) times daily.     Family History       Problem Relation (Age of Onset)    Alcohol abuse Mother, Father, Brother    Cancer Mother, Father    Cirrhosis Brother    Emphysema Mother    Heart attack Mother, Brother    Heart disease Brother    Lung cancer Father    No Known Problems Sister, Maternal Uncle, Paternal Uncle, Maternal Grandmother, Maternal Grandfather, Paternal Grandmother, Paternal Grandfather    Osteoarthritis Father, Paternal Aunt    Retinal detachment Maternal Aunt    Skin cancer Father          Tobacco Use    Smoking status: Never    Smokeless tobacco: Never   Substance and Sexual Activity    Alcohol use: Yes     Comment: 2-3 times per year.    Drug use: No    Sexual activity: Yes     Partners: Male     Birth control/protection: Surgical     Review of Systems   Constitutional:  Negative for chills and fever.   Eyes:  Negative for visual disturbance.   Respiratory:  Positive for cough. Negative for shortness of breath.    Cardiovascular:  Positive for leg swelling. Negative for chest pain.   Genitourinary:  Negative for dysuria.   Musculoskeletal:  Positive for back pain.   Objective:     Vital Signs (Most Recent):  Temp: 99.3 °F (37.4 °C) (06/23/23 1112)  Pulse: 87 (06/23/23 1112)  Resp: 18 (06/23/23 1505)  BP: (!) 98/54 (06/23/23 1112)  SpO2: 96 % (06/23/23 1112) Vital Signs (24h Range):  Temp:  [98.6 °F (37 °C)-99.3 °F (37.4 °C)] 99.3 °F (37.4 °C)  Pulse:  [80-92] 87  Resp:  [14-20]  18  SpO2:  [94 %-100 %] 96 %  BP: ()/(53-74) 98/54        There is no height or weight on file to calculate BMI.     Physical Exam  Constitutional:       Appearance: Normal appearance. She is not ill-appearing.   HENT:      Head: Normocephalic and atraumatic.      Mouth/Throat:      Mouth: Mucous membranes are moist.      Pharynx: Oropharynx is clear.   Eyes:      Extraocular Movements: Extraocular movements intact.      Conjunctiva/sclera: Conjunctivae normal.   Neck:      Comments: No JVD appreciated  Cardiovascular:      Rate and Rhythm: Normal rate and regular rhythm.      Pulses: Normal pulses.      Heart sounds: Normal heart sounds.   Pulmonary:      Effort: Pulmonary effort is normal.      Breath sounds: Normal breath sounds.      Comments: Lungs clear to auscultation bilaterally  Abdominal:      General: Bowel sounds are normal.      Palpations: Abdomen is soft.      Tenderness: There is no abdominal tenderness.   Musculoskeletal:      Cervical back: Normal range of motion.      Comments: 1+ BLE pitting edema to level of lower thighs. Some weeping noted to RLE   Skin:     General: Skin is warm and dry.      Capillary Refill: Capillary refill takes less than 2 seconds.   Neurological:      General: No focal deficit present.      Mental Status: She is alert.   Psychiatric:         Mood and Affect: Mood normal.        Significant Labs: All pertinent labs within the past 24 hours have been reviewed.  CBC:   Recent Labs   Lab 06/23/23  0535   WBC 4.64   HGB 8.4*   HCT 26.6*        CMP:   Recent Labs   Lab 06/22/23  1915 06/23/23  0534    141   K 3.2* 3.4*   * 111*   CO2 20* 26    75   BUN 6* 7*   CREATININE 0.5 0.7   CALCIUM 6.7* 7.5*   PROT 4.7*  --    ALBUMIN 2.8*  --    BILITOT 0.2  --    ALKPHOS 108  --    AST 88*  --    ALT 65*  --    ANIONGAP 8 4*       Significant Imaging: I have reviewed all pertinent imaging results/findings within the past 24 hours.    Assessment/Plan:      * Lumbar stenosis  S/p L4/L5 TLIF on 6/22    - Management per primary team      Lower extremity edema  Mild intermittent asthma without complication  62 y.o F with RA (on leflunomide and prednisone), Sjogren's, steroid induced osteoporosis, GERD, HLD, CAD, asthma (on symbicort) admitted for L4/L5 TLIF which she completed on 6/22    Medicine consulted for wheezing/crackles. On evaluation, patient in no acute distress. Comfortable, no increasaed WOB, saturating well on room air. She did endorse wheezing earlier that morning after a coughing fit. She has hx of asthma and her home inhaler was not restarted. She has been having > 2 month history of BLE edema that she contributes to sleeping in a recliner (2/2 back pain, not SOB). Denies hx of heart failure. Last TTE in 2020 with no systolic or diastolic dysfunction.     Lungs clear to auscultation on exam. 1+ pitting BLE edema to level of lower thighs.     Recommendations:  - F/u CXR  - Patient's home inhaler symbicort not on formulary, will start Breo which has similar components (inhaled corticosteroids/LABA)  - Placed order for duonebs q4h prn while awake  - One of patient's triggers is GERD, please ensure PPI or H2 blocker administered  - Last TTE in 2020 negative for diastolic/systolic dysfunction, but will obtain BNP and new TTE out of abundance of caution. High suspicion BLE edema 2/2 venous insufficiency as patient reports edema is largely dependent.   - Will give 1x Lasix 20 IVP in the meantime. She may continue home PO 20mg afterwards.   - Wound care consulted for RLE samir noted on physical exam        VTE Risk Mitigation (From admission, onward)         Ordered     Place CAPO hose  Until discontinued         06/23/23 1612     heparin (porcine) injection 5,000 Units  Every 8 hours         06/22/23 1615     IP VTE HIGH RISK PATIENT  Once         06/22/23 1615     Place sequential compression device  Until discontinued         06/22/23 1224               Thank you for your consult. I will follow-up with patient. Please contact us if you have any additional questions.    Cindy Cosby MD  Department of Hospital Medicine   [facility]

## 2023-06-23 NOTE — HPI
62 y.o F with RA (on leflunomide and prednisone), Sjogren's, steroid induced osteoporosis, GERD, HLD, CAD, asthma (on symbicort) admitted for L4/L5 TLIF which she completed on 6/22    Medicine consulted for wheezing/crackles. On evaluation, patient in no acute distress. Comfortable, no increasaed WOB, saturating well on room air. She did endorse wheezing earlier that morning after a coughing fit. She has hx of asthma and her home inhaler was not restarted. She has been having > 2 month history of BLE edema that she contributes to sleeping in a recliner (2/2 back pain, not SOB). Denies hx of heart failure. Last TTE in 2020 with no systolic or diastolic dysfunction.

## 2023-06-23 NOTE — PLAN OF CARE
[facility] - Surgery  Initial Discharge Assessment       Primary Care Provider: Krystal Singleton DO    Admission Diagnosis: Lumbar radiculopathy [M54.16]    Admission Date: 6/22/2023  Expected Discharge Date: 6/24/2023         Payor: [facility] MCARE / Plan: [employer] [facility] MEDICARE ADVANTAGE / Product Type: Medicare Advantage /     Extended Emergency Contact Information  Primary Emergency Contact: Kurt Peterson Jr  Address: [address]            [place] [address] [facility]  Home Phone: [phone]  Work Phone: [phone]  Mobile Phone: [phone]  Relation: Spouse    Discharge Plan A: Home Health         [facility]  [address]  [place] [address]  Phone: [phone] Fax: [phone]    [id number] - [place], [place] - [address]  [address]  [place] [address]  Phone: [phone] Fax: [phone]    [facility] [place], [place] - [address]  [address]  [facility] [address]  Phone: [phone] Fax: [phone]    [id number] [place] [facility] [address]  [address] [address]  Phone: [phone] Fax: [phone]      Initial Assessment (most recent)       Adult Discharge Assessment - 06/23/23 1411          Discharge Assessment    Assessment Type Discharge Planning Assessment     Confirmed/corrected address, phone number and insurance Yes     Confirmed Demographics Correct on Facesheet     Source of Information family     If unable to respond/provide information was family/caregiver contacted? Yes     Contact Name/Number Kurt Peterson JR. (Spouse)     Does patient/caregiver understand observation status Yes     Communicated STEW with patient/caregiver Yes     People in Home spouse     Do you expect to return to your current living situation? Yes     Do you have help at home or someone to help you manage your care at home? Yes      Who are your caregiver(s) and their phone number(s)? Kurt Peterson jr. [phone]     Prior to hospitilization cognitive status: Alert/Oriented     Current cognitive status: Alert/Oriented     Walking or Climbing Stairs ambulation difficulty, requires equipment     Mobility Management transport wheelchair, cane     Dressing/Bathing bathing difficulty, requires equipment     Dressing/Bathing Management shower bench, bedside commode     Equipment Currently Used at Home bedside commode;bath bench;cane, straight;wheelchair     Readmission within 30 days? No     Patient currently being followed by outpatient case management? No     Do you currently have service(s) that help you manage your care at home? No     Do you take prescription medications? Yes     Do you have prescription coverage? Yes     Do you have any problems affording any of your prescribed medications? No     Is the patient taking medications as prescribed? yes     Who is going to help you get home at discharge? SPouse and Home Health     How do you get to doctors appointments? family or friend will provide     Are you on dialysis? No     Do you take coumadin? No     Discharge Plan A Home Health        Physical Activity    On average, how many days per week do you engage in moderate to strenuous exercise (like a brisk walk)? 0 days     On average, how many minutes do you engage in exercise at this level? 0 min        Financial Resource Strain    How hard is it for you to pay for the very basics like food, housing, medical care, and heating? Not hard at all        Housing Stability    In the last 12 months, was there a time when you were not able to pay the mortgage or rent on time? No     In the last 12 months, how many places have you lived? 1     In the last 12 months, was there a time when you did not have a steady place to sleep or slept in a shelter (including now)? No        Transportation Needs    In the past 12 months, has lack of  transportation kept you from medical appointments or from getting medications? No     In the past 12 months, has lack of transportation kept you from meetings, work, or from getting things needed for daily living? No        Food Insecurity    Within the past 12 months, you worried that your food would run out before you got the money to buy more. Never true     Within the past 12 months, the food you bought just didn't last and you didn't have money to get more. Never true        Stress    Do you feel stress - tense, restless, nervous, or anxious, or unable to sleep at night because your mind is troubled all the time - these days? Not at all        Social Connections    In a typical week, how many times do you talk on the phone with family, friends, or neighbors? More than three times a week     How often do you get together with friends or relatives? More than three times a week     How often do you attend Holiness or Adventism services? More than 4 times per year     Do you belong to any clubs or organizations such as Holiness groups, unions, fraternal or athletic groups, or school groups? Yes     How often do you attend meetings of the clubs or organizations you belong to? More than 4 times per year     Are you , , , , never , or living with a partner?         Alcohol Use    Q1: How often do you have a drink containing alcohol? Monthly or less     Q2: How many drinks containing alcohol do you have on a typical day when you are drinking? 1 or 2     Q3: How often do you have six or more drinks on one occasion? Never                   Spoke with spouse to complete d/c planning assessment as patient was sleeping. Patient and  live in a single story home with 5 steps to enter. She has a shower bench, Transport W/C, cane, Bedside commode. Verified PCP, Pharmacy and health insurance. PT/OT deisy completed and recommending HH and a RW at d/c. [facility]  and DME ordered from [facility] for bedside delivery. Anticipate d/c Saturday versus Sunday. On-call team will continue to follow.    Brigida Rojas RNCM  Case Management  [facility]  [phone]

## 2023-06-23 NOTE — PT/OT/SLP EVAL
Occupational Therapy   Co-Evaluation and Co-Treatment  Co-treatment performed due to patient's multiple deficits requiring two skilled therapists to appropriately and safely assess patient's strength and endurance while facilitating functional tasks in addition to accommodating for patient's activity tolerance.      Name: Joyce Peterson  MRN: [id number]  Admitting Diagnosis: Lumbar stenosis  Recent Surgery: Procedure(s) (LRB):  FUSION, SPINE, LUMBAR, TLIF, POSTERIOR APPROACH, USING PEDICLE SCREW L4/5 spinewave SNS:SSEP/EMG (N/A) 1 Day Post-Op    Recommendations:     Discharge Recommendations: home health OT  Discharge Equipment Recommendations:  walker, rolling  Barriers to discharge:  None    Assessment:     Joyce Peterson is a 62 y.o. female with a medical diagnosis of Lumbar stenosis.  She presents with the following performance deficits affecting function: weakness, impaired endurance, impaired self care skills, impaired functional mobility, gait instability, impaired balance, pain, decreased safety awareness, decreased lower extremity function, decreased upper extremity function, decreased coordination, impaired skin, orthopedic precautions. Pt agreeable to therapy and tolerated well. She was primarily limited by pain, generalized weakness, and instability. Pt would continue to benefit from skilled OT services to maximize functional independence with ADLs and functional mobility, reduce caregiver burden, and facilitate safe discharge in the least restrictive environment. OT recommending HHOT once medically appropriate for discharge.      Rehab Prognosis: Good; patient would benefit from acute skilled OT services to address these deficits and reach maximum level of function.       Plan:     Patient to be seen 3 x/week to address the above listed problems via self-care/home management, therapeutic activities, therapeutic exercises, neuromuscular re-education  Plan of Care Expires: 07/23/23  Plan of Care  Reviewed with: patient, spouse    Subjective     Chief Complaint: pain  Patient/Family Comments/goals: to get better    Occupational Profile:  Living Environment: Pt lives with her spouse SSH with 5 NIMESH and HR. W-I-S with built in bench  Previous level of function: Mod (I) with ADLs and functional mobility using a SPC for HH mobility  Roles and Routines: pt is a homemaker and (+) drives  Equipment Used at Home: bedside commode, bath bench, cane, straight, wheelchair  Assistance upon Discharge: spouse    Pain/Comfort:  Pain Rating 1: 10/10  Location - Side 1: Bilateral  Location - Orientation 1: generalized  Location 1:  (back and legs)  Pain Addressed 1: Pre-medicate for activity, Reposition, Distraction, Cessation of Activity  Pain Rating Post-Intervention 1:  (unrated)    Patients cultural, spiritual, Advent conflicts given the current situation: no    Objective:     Communicated with: Tacos PEREZ prior to session.  Patient found sitting edge of bed with hemovac, peripheral IV, SCD upon OT entry to room.    General Precautions: Standard, fall  Orthopedic Precautions: spinal precautions  Braces: N/A  Respiratory Status: Room air    Occupational Performance:    Bed Mobility:    Patient completed Scooting/Bridging with supervision    Functional Mobility/Transfers:  Patient completed Sit <> Stand Transfer with contact guard assistance  with  no assistive device   Patient completed Bed <> Chair Transfer using Step Transfer technique with minimum assistance with no assistive device and hand-held assist  Patient completed Toilet Transfer Step Transfer technique with minimum assistance with  no AD and hand-held assist  Functional Mobility: Pt engaged in functional mobility to simulate household/community distances 10 ft x2 trials to/from bathroom with Min (A) and HHA B UE in order to maximize functional endurance and standing balance required for engagement in occupations of choice   No LOB, unsteady gait  observed    Activities of Daily Living:  Grooming: stand by assistance to wash hands standing at sink  Upper Body Dressing: stand by assistance to don hospital gown posteriorly EOB  Lower Body Dressing: stand by assistance as pt donned/doffed socks sitting EOB with figure 4 technique  Toileting: stand by assistance as pt completed pericare seated on toilet    Cognitive/Visual Perceptual:  Cognitive/Psychosocial Skills:     -       Oriented to: Person, Place, Time, and Situation   -       Follows Commands/attention:Follows multistep  commands  -       Safety awareness/insight to disability: intact   -       Mood/Affect/Coping skills/emotional control: Cooperative    Physical Exam:  Sensation:    -       Intact  light/touch BUE  Upper Extremity Range of Motion:     -       Right Upper Extremity: WFL  -       Left Upper Extremity: WFL  Upper Extremity Strength:    -       Right Upper Extremity: WFL  -       Left Upper Extremity: WFL    AMPAC 6 Click ADL:  AMPAC Total Score: 19    Treatment & Education:  -Education on spinal precautions  -Education on energy conservation and task modification to maximize safety and (I) during ADLs and mobility  -Education on importance of OOB activity to improve overall activity tolerance and promote recovery  -Pt educated to call for assistance and to transfer with hospital staff only  -Provided education regarding role of OT, POC, & discharge recommendations with pt & spouse verbalizing understanding.  Pt had no further questions & when asked whether there were any concerns pt reported none.     Patient left up in chair with all lines intact, call button in reach, RN notified, and spouse present    GOALS:   Multidisciplinary Problems       Occupational Therapy Goals          Problem: Occupational Therapy    Goal Priority Disciplines Outcome Interventions   Occupational Therapy Goal     OT, PT/OT Ongoing, Progressing    Description: Goals to be met by: 7/14/23     Patient will increase  functional independence with ADLs by performing:    UE Dressing with Modified [place].  LE Dressing with Modified [place].  Grooming while standing with Modified [place].  Toileting from toilet/bedside commode with Modified [place] for hygiene and clothing management.   Toilet transfer to toilet/bedside commode with Modified [place] and LRAD as needed.                         History:     Past Medical History:   Diagnosis Date    Acid reflux     Allergy     Anemia     Asthma     Coronary artery disease     CS (cervical spondylosis) 03/08/2013    Degenerative disc disease     Degenerative joint disease of hindfoot, left 6/28/2022    Dry eyes     Dry mouth     Gastroparesis     History of methotrexate therapy 01/19/2022    Hyperlipidemia     Lateral meniscus derangement 04/06/2016    Lobular carcinoma in situ     Lumbar spondylosis 03/08/2013    Osteoarthritis     Osteoporosis     Pes planovalgus, acquired, left 6/28/2022    Rheumatoid arthritis(714.0)     Rupture of left triceps tendon 10/17/2018    Umbilical hernia 08/13/2015         Past Surgical History:   Procedure Laterality Date    BREAST BIOPSY Left 01/29/2002    core bx    CARPAL TUNNEL RELEASE Right 05/2017    CHOLECYSTECTOMY  2004    COLONOSCOPY      10/11    COLONOSCOPY N/A 6/29/2017    Procedure: COLONOSCOPY;  Surgeon: López Moore MD;  [facility]  Service: Endoscopy;  Laterality: N/A;    EPIDURAL STEROID INJECTION N/A 11/18/2021    Procedure: INJECTION, STEROID, EPIDURAL, L5-S1 IL NEED CONSENT;  Surgeon: Tj Mayfield MD;  Location: [facility] PAIN MGT;  Service: Pain Management;  Laterality: N/A;    EPIDURAL STEROID INJECTION N/A 9/29/2022    Procedure: LUMBAR L3/L4 IL MADELINE CONTRAST;  Surgeon: Tj Mayfield MD;  Location: [facility] PAIN [facility]GT;  Service: Pain Management;  Laterality: N/A;    EPIDURAL STEROID INJECTION N/A 12/12/2022    Procedure: INJECTION, STEROID, EPIDURAL L5/S1 IL CONTRAST;  Surgeon: Tj Mayfield MD;   Location: [facility] PAIN MGT;  Service: Pain Management;  Laterality: N/A;    EPIDURAL STEROID INJECTION N/A 4/6/2023    Procedure: INJECTION, STEROID, EPIDURAL L5/S1;  Surgeon: Tj Mayfield MD;  Location: [facility] PAIN MGT;  Service: Pain Management;  Laterality: N/A;    FOOT ARTHRODESIS Left 1/11/2023    Procedure: FUSION, FOOT;  Surgeon: Ariella Finnegan MD;  Location: [facility] OR;  Service: Orthopedics;  Laterality: Left;  nimbus    FUSION, SPINE, LUMBAR, TLIF, POSTERIOR APPROACH, USING PEDICLE SCREW N/A 6/22/2023    Procedure: FUSION, SPINE, LUMBAR, TLIF, POSTERIOR APPROACH, USING PEDICLE SCREW L4/5 spinewave SNS:SSEP/EMG;  Surgeon: Jh Mosqueda MD;  Location: [facility] OR [address] [place] [address];  Service: Neurosurgery;  Laterality: N/A;    HARVESTING OF BONE GRAFT Left 1/11/2023    Procedure: SURGICAL PROCUREMENT, BONE GRAFT;  Surgeon: Ariella Finnegan MD;  Location: [facility] OR;  Service: Orthopedics;  Laterality: Left;    INJECTION OF ANESTHETIC AGENT AROUND NERVE Bilateral 8/23/2021    Procedure: BLOCK, NERVE, MEDIAL BRANCH L3,L4,L5;  Surgeon: Tj Mayfield MD;  Location: [facility] PAIN MGT;  Service: Pain Management;  Laterality: Bilateral;  1 of 2    INJECTION OF ANESTHETIC AGENT AROUND NERVE Bilateral 8/26/2021    Procedure: BLOCK, NERVE, MEDIAL BRANCH L3,L4,L5;  Surgeon: Tj Mayfield MD;  Location: [facility] PAIN [facility]GT;  Service: Pain Management;  Laterality: Bilateral;  2 of 2    INJECTION OF ANESTHETIC AGENT AROUND NERVE Left 7/7/2022    Procedure: BLOCK, NERVE, LEFT OBTURATOR AND FEMORAL;  Surgeon: Tj Mayfield MD;  Location: [facility] PAIN [facility]GT;  Service: Pain Management;  Laterality: Left;    INJECTION OF FACET JOINT Bilateral 3/12/2020    Procedure: FACET JOINT INJECTION (LUMBAR BLOCK) BILATERAL L4-5 AND L5-S1 DIRECT REFERRAL;  Surgeon: Tj Mayfield MD;  Location: [facility] PAIN MGT;  Service: Pain Management;  Laterality: Bilateral;  NEEDS CONSENT    INJECTION OF FACET JOINT Bilateral 3/29/2021    Procedure: INJECTION, FACET JOINT L3/4, L4/5, L5/S1;   Surgeon: Tj Mayfield MD;  Location: [facility] PAIN MGT;  Service: Pain Management;  Laterality: Bilateral;    INJECTION OF JOINT Right 7/30/2020    Procedure: INJECTION, JOINT, RIGHT HIP Interarticular under flouro;  Surgeon: Tj Mayfield MD;  Location: [facility]IN [facility]GT;  Service: Pain Management;  Laterality: Right;  INJECTION, JOINT, RIGHT HIP Interarticular under flouro    INJECTION OF JOINT Right 2/21/2022    Procedure: Injection, Joint RIGHT ISCHIAL BURSA;  Surgeon: Tj Mayfield MD;  Location: [facility] PAIN [facility]GT;  Service: Pain Management;  Laterality: Right;    INTRAMEDULLARY RODDING OF FEMUR Left 5/21/2019    Procedure: INSERTION, INTRAMEDULLARY ARIEL, FEMUR;  Surgeon: López Duff MD;  Location: [address];  Service: Orthopedics;  Laterality: Left;    LENGTHENING OF TENDON OF LOWER EXTREMITY Left 1/11/2023    Procedure: LENGTHENING, TENDON, LOWER EXTREMITY;  Surgeon: Ariella Finnegan MD;  Location: [facility];  Service: Orthopedics;  Laterality: Left;    MAGNETIC RESONANCE IMAGING N/A 5/29/2023    Procedure: MRI (Magnetic Resonance Imagine);  Surgeon: Sujey Robin;  Location: [facility];  Service: Anesthesiology;  Laterality: N/A;    RADIOFREQUENCY ABLATION Left 9/20/2021    Procedure: RADIOFREQUENCY ABLATION left L3,4,5 RFA  1 of 2  CONSENT NEEDED;  Surgeon: Tj Mayfield MD;  Location: [facility]IN [facility]GT;  Service: Pain Management;  Laterality: Left;    RADIOFREQUENCY ABLATION Right 10/4/2021    Procedure: RADIOFREQUENCY ABLATION  right L3,4,5 RFA   2 of 2  CONSENT NEEDED;  Surgeon: Tj Mayfield MD;  Location: [facility]IN [facility]GT;  Service: Pain Management;  Laterality: Right;    RADIOFREQUENCY ABLATION Left 4/21/2022    Procedure: Radiofrequency Ablation LEFT L3,L4,L5;  Surgeon: Tj Mayfield MD;  Location: [facility];  Service: Pain Management;  Laterality: Left;    RADIOFREQUENCY ABLATION Right 5/12/2022    Procedure: Radiofrequency Ablation RIGHT L3,L4,L5;  Surgeon: Tj Mayfield MD;  Location: [facility]GT;  Service:  Pain Management;  Laterality: Right;    RADIOFREQUENCY ABLATION Left 7/25/2022    Procedure: Radiofrequency Ablation Left Femoral & Obturator;  Surgeon: Tj Mayfield MD;  Location: [facility];  Service: Pain Management;  Laterality: Left;    REPAIR OF TRICEPS TENDON Left 10/17/2018    Procedure: REPAIR, TENDON, TRICEPS left elbow;  Surgeon: Staci Yarbrough MD;  Location: [facility] OR 1ST FLR;  Service: Orthopedics;  Laterality: Left;  Anesthesia: General and regional. PRONE, k-wire , hand pan 1 and pan 2, CALL ARTHREX/Tamera notified 10-12 LO    TONSILLECTOMY      TRANSFORAMINAL EPIDURAL INJECTION OF STEROID Right 8/31/2020    Procedure: INJECTION, STEROID, EPIDURAL, TRANSFORAMINAL,  APPROACH, L3-L4 and L4-L5 need consent;  Surgeon: Tj Mayfield MD;  Location: [facility]IN [facility]GT;  Service: Pain Management;  Laterality: Right;    TRANSFORAMINAL EPIDURAL INJECTION OF STEROID Bilateral 7/23/2021    Procedure: INJECTION, STEROID, EPIDURAL, TRANSFORAMINAL APPROACH L4/5;  Surgeon: Tj Mayfield MD;  Location: [facility] PAIN [facility]GT;  Service: Pain Management;  Laterality: Bilateral;    TRIGGER POINT INJECTION N/A 7/23/2021    Procedure: INJECTION, TRIGGER POINT SCAPULAR;  Surgeon: Tj Mayfield MD;  Location: [facility];  Service: Pain Management;  Laterality: N/A;    TUBAL LIGATION  2003    UPPER GASTROINTESTINAL ENDOSCOPY      10/11    uterine ablation  2003       Time Tracking:     OT Date of Treatment: 06/23/23  OT Start Time: 0953  OT Stop Time: 1019  OT Total Time (min): 26 min    Billable Minutes:Evaluation 10  Self Care/Home Management 16    6/23/2023

## 2023-06-24 PROBLEM — E87.6 HYPOKALEMIA: Status: ACTIVE | Noted: 2023-06-24

## 2023-06-24 LAB
ANION GAP SERPL CALC-SCNC: 6 MMOL/L (ref 8–16)
ANION GAP SERPL CALC-SCNC: 7 MMOL/L (ref 8–16)
BASOPHILS # BLD AUTO: 0.01 K/UL (ref 0–0.2)
BASOPHILS NFR BLD: 0.2 % (ref 0–1.9)
BUN SERPL-MCNC: 6 MG/DL (ref 8–23)
BUN SERPL-MCNC: 7 MG/DL (ref 8–23)
CALCIUM SERPL-MCNC: 7.5 MG/DL (ref 8.7–10.5)
CALCIUM SERPL-MCNC: 7.8 MG/DL (ref 8.7–10.5)
CHLORIDE SERPL-SCNC: 103 MMOL/L (ref 95–110)
CHLORIDE SERPL-SCNC: 106 MMOL/L (ref 95–110)
CO2 SERPL-SCNC: 27 MMOL/L (ref 23–29)
CO2 SERPL-SCNC: 30 MMOL/L (ref 23–29)
CREAT SERPL-MCNC: 0.6 MG/DL (ref 0.5–1.4)
CREAT SERPL-MCNC: 0.6 MG/DL (ref 0.5–1.4)
DIFFERENTIAL METHOD: ABNORMAL
EOSINOPHIL # BLD AUTO: 0 K/UL (ref 0–0.5)
EOSINOPHIL NFR BLD: 0.2 % (ref 0–8)
ERYTHROCYTE [DISTWIDTH] IN BLOOD BY AUTOMATED COUNT: 16.6 % (ref 11.5–14.5)
EST. GFR  (NO RACE VARIABLE): >60 ML/MIN/1.73 M^2
EST. GFR  (NO RACE VARIABLE): >60 ML/MIN/1.73 M^2
GLUCOSE SERPL-MCNC: 103 MG/DL (ref 70–110)
GLUCOSE SERPL-MCNC: 110 MG/DL (ref 70–110)
HCT VFR BLD AUTO: 27.8 % (ref 37–48.5)
HGB BLD-MCNC: 8.7 G/DL (ref 12–16)
IMM GRANULOCYTES # BLD AUTO: 0.02 K/UL (ref 0–0.04)
IMM GRANULOCYTES NFR BLD AUTO: 0.4 % (ref 0–0.5)
LYMPHOCYTES # BLD AUTO: 1 K/UL (ref 1–4.8)
LYMPHOCYTES NFR BLD: 17.6 % (ref 18–48)
MCH RBC QN AUTO: 26.5 PG (ref 27–31)
MCHC RBC AUTO-ENTMCNC: 31.3 G/DL (ref 32–36)
MCV RBC AUTO: 85 FL (ref 82–98)
MONOCYTES # BLD AUTO: 0.5 K/UL (ref 0.3–1)
MONOCYTES NFR BLD: 9.7 % (ref 4–15)
NEUTROPHILS # BLD AUTO: 3.9 K/UL (ref 1.8–7.7)
NEUTROPHILS NFR BLD: 71.9 % (ref 38–73)
NRBC BLD-RTO: 0 /100 WBC
PLATELET # BLD AUTO: 185 K/UL (ref 150–450)
PMV BLD AUTO: 10.3 FL (ref 9.2–12.9)
POTASSIUM SERPL-SCNC: 2.9 MMOL/L (ref 3.5–5.1)
POTASSIUM SERPL-SCNC: 5 MMOL/L (ref 3.5–5.1)
RBC # BLD AUTO: 3.28 M/UL (ref 4–5.4)
SODIUM SERPL-SCNC: 139 MMOL/L (ref 136–145)
SODIUM SERPL-SCNC: 140 MMOL/L (ref 136–145)
WBC # BLD AUTO: 5.44 K/UL (ref 3.9–12.7)

## 2023-06-24 PROCEDURE — 97116 GAIT TRAINING THERAPY: CPT | Mod: CQ

## 2023-06-24 PROCEDURE — 11000001 HC ACUTE MED/SURG PRIVATE ROOM

## 2023-06-24 PROCEDURE — 63600175 PHARM REV CODE 636 W HCPCS: Performed by: PHYSICIAN ASSISTANT

## 2023-06-24 PROCEDURE — 80048 BASIC METABOLIC PNL TOTAL CA: CPT

## 2023-06-24 PROCEDURE — 25000003 PHARM REV CODE 250: Performed by: PHYSICIAN ASSISTANT

## 2023-06-24 PROCEDURE — 97110 THERAPEUTIC EXERCISES: CPT | Mod: CQ

## 2023-06-24 PROCEDURE — 99233 PR SUBSEQUENT HOSPITAL CARE,LEVL III: ICD-10-PCS | Mod: GT,,, | Performed by: STUDENT IN AN ORGANIZED HEALTH CARE EDUCATION/TRAINING PROGRAM

## 2023-06-24 PROCEDURE — 85025 COMPLETE CBC W/AUTO DIFF WBC: CPT

## 2023-06-24 PROCEDURE — 25000003 PHARM REV CODE 250

## 2023-06-24 PROCEDURE — 99233 SBSQ HOSP IP/OBS HIGH 50: CPT | Mod: GT,,, | Performed by: STUDENT IN AN ORGANIZED HEALTH CARE EDUCATION/TRAINING PROGRAM

## 2023-06-24 PROCEDURE — 36415 COLL VENOUS BLD VENIPUNCTURE: CPT

## 2023-06-24 RX ADMIN — METHOCARBAMOL 750 MG: 750 TABLET ORAL at 04:06

## 2023-06-24 RX ADMIN — LEFLUNOMIDE 20 MG: 20 TABLET ORAL at 09:06

## 2023-06-24 RX ADMIN — ACETAMINOPHEN 650 MG: 325 TABLET ORAL at 03:06

## 2023-06-24 RX ADMIN — HYDROMORPHONE HYDROCHLORIDE 4 MG: 2 TABLET ORAL at 06:06

## 2023-06-24 RX ADMIN — HEPARIN SODIUM 5000 UNITS: 5000 INJECTION INTRAVENOUS; SUBCUTANEOUS at 06:06

## 2023-06-24 RX ADMIN — HEPARIN SODIUM 5000 UNITS: 5000 INJECTION INTRAVENOUS; SUBCUTANEOUS at 02:06

## 2023-06-24 RX ADMIN — SENNOSIDES AND DOCUSATE SODIUM 1 TABLET: 50; 8.6 TABLET ORAL at 09:06

## 2023-06-24 RX ADMIN — OXYBUTYNIN CHLORIDE 5 MG: 5 TABLET, EXTENDED RELEASE ORAL at 09:06

## 2023-06-24 RX ADMIN — METHOCARBAMOL 750 MG: 750 TABLET ORAL at 08:06

## 2023-06-24 RX ADMIN — FUROSEMIDE 20 MG: 20 TABLET ORAL at 09:06

## 2023-06-24 RX ADMIN — MUPIROCIN: 20 OINTMENT TOPICAL at 09:06

## 2023-06-24 RX ADMIN — ACETAMINOPHEN 650 MG: 325 TABLET ORAL at 02:06

## 2023-06-24 RX ADMIN — PREGABALIN 100 MG: 50 CAPSULE ORAL at 08:06

## 2023-06-24 RX ADMIN — HYDROMORPHONE HYDROCHLORIDE 4 MG: 2 TABLET ORAL at 01:06

## 2023-06-24 RX ADMIN — METHOCARBAMOL 750 MG: 750 TABLET ORAL at 01:06

## 2023-06-24 RX ADMIN — HYDROMORPHONE HYDROCHLORIDE 4 MG: 2 TABLET ORAL at 12:06

## 2023-06-24 RX ADMIN — PREGABALIN 100 MG: 50 CAPSULE ORAL at 09:06

## 2023-06-24 RX ADMIN — POLYETHYLENE GLYCOL 3350 17 G: 17 POWDER, FOR SOLUTION ORAL at 09:06

## 2023-06-24 RX ADMIN — ATORVASTATIN CALCIUM 80 MG: 40 TABLET, FILM COATED ORAL at 09:06

## 2023-06-24 RX ADMIN — SENNOSIDES AND DOCUSATE SODIUM 1 TABLET: 50; 8.6 TABLET ORAL at 08:06

## 2023-06-24 RX ADMIN — FAMOTIDINE 20 MG: 20 TABLET, FILM COATED ORAL at 08:06

## 2023-06-24 RX ADMIN — POTASSIUM BICARBONATE 40 MEQ: 391 TABLET, EFFERVESCENT ORAL at 02:06

## 2023-06-24 RX ADMIN — HYDROMORPHONE HYDROCHLORIDE 4 MG: 2 TABLET ORAL at 07:06

## 2023-06-24 RX ADMIN — PROMETHAZINE HYDROCHLORIDE 25 MG: 12.5 TABLET ORAL at 12:06

## 2023-06-24 RX ADMIN — PREGABALIN 100 MG: 50 CAPSULE ORAL at 02:06

## 2023-06-24 RX ADMIN — HEPARIN SODIUM 5000 UNITS: 5000 INJECTION INTRAVENOUS; SUBCUTANEOUS at 08:06

## 2023-06-24 RX ADMIN — HYDROMORPHONE HYDROCHLORIDE 4 MG: 2 TABLET ORAL at 04:06

## 2023-06-24 RX ADMIN — PROMETHAZINE HYDROCHLORIDE 25 MG: 12.5 TABLET ORAL at 06:06

## 2023-06-24 RX ADMIN — HYDROMORPHONE HYDROCHLORIDE 4 MG: 2 TABLET ORAL at 10:06

## 2023-06-24 RX ADMIN — POTASSIUM BICARBONATE 40 MEQ: 391 TABLET, EFFERVESCENT ORAL at 05:06

## 2023-06-24 RX ADMIN — HYDROMORPHONE HYDROCHLORIDE 4 MG: 2 TABLET ORAL at 03:06

## 2023-06-24 RX ADMIN — POTASSIUM BICARBONATE 40 MEQ: 391 TABLET, EFFERVESCENT ORAL at 01:06

## 2023-06-24 RX ADMIN — HYDROMORPHONE HYDROCHLORIDE 4 MG: 2 TABLET ORAL at 09:06

## 2023-06-24 RX ADMIN — METHOCARBAMOL 750 MG: 750 TABLET ORAL at 09:06

## 2023-06-24 NOTE — NURSING
"Paged resident on call for ECHO . Reference patient did not received ECHO yesterday for edema. Return call stating " ECHO will be done today .  "

## 2023-06-24 NOTE — SUBJECTIVE & OBJECTIVE
"Principal Problem:Lumbar stenosis    Principal Orthopedic Problem: s/p L4/L5 TLIF on 6/22    Interval History: HM following for respiratory eval. Wound care consulted for RLE weeping, eval pending. BNP 59, echo pending. Reports pain to her lower back is muscular in nature at this time. 12 feet x 2 with PT. BP soft with 100's systolic. Drain output 100/24 hours. Overnight pending.     Review of patient's allergies indicates:   Allergen Reactions    Actemra [tocilizumab] Other (See Comments)     Severe dizziness    Codeine Nausea And Vomiting and Hives    Gold au 198 Hives and Rash    Hydroxychloroquine Other (See Comments)     Can't remember the reaction      Iodinated contrast media Other (See Comments)     Other reaction(s): BURNING ALL OVER    Iodine Other (See Comments) and Hives     Other reaction(s): BURNING ALL OVER - Iodine dye - Not topical    Ondansetron Nausea And Vomiting    Sulfa (sulfonamide antibiotics) Other (See Comments)     Can't remember the reaction    Zofran [ondansetron hcl (pf)] Nausea And Vomiting     Pt reports that last time she received zofran she started vomiting again    Methotrexate analogues Nausea Only    Pneumococcal vaccine Other (See Comments)    Pneumovax 23 [pneumococcal 23-argenis ps vaccine] Other (See Comments)     "sick"    Methotrexate Nausea Only       Current Facility-Administered Medications   Medication    acetaminophen tablet 650 mg    albuterol-ipratropium 2.5 mg-0.5 mg/3 mL nebulizer solution 3 mL    atorvastatin tablet 80 mg    famotidine tablet 20 mg    fluticasone furoate-vilanteroL 100-25 mcg/dose diskus inhaler 1 puff    furosemide tablet 20 mg    heparin (porcine) injection 5,000 Units    HYDROmorphone tablet 2 mg    And    HYDROmorphone tablet 4 mg    leflunomide tablet 20 mg    methocarbamoL tablet 750 mg    mupirocin 2 % ointment    oxybutynin 24 hr tablet 5 mg    oxyCODONE immediate release tablet 5 mg    oxyCODONE immediate release tablet Tab 10 mg    " polyethylene glycol packet 17 g    pregabalin capsule 100 mg    promethazine tablet 25 mg    senna-docusate 8.6-50 mg per tablet 1 tablet    sodium chloride 0.9% flush 5 mL     Objective:     Vital Signs (Most Recent):  Temp: 99.8 °F (37.7 °C) (06/24/23 0505)  Pulse: 102 (06/24/23 0505)  Resp: 16 (06/24/23 0505)  BP: (!) 100/51 (06/24/23 0505)  SpO2: (!) 92 % (06/24/23 0505) Vital Signs (24h Range):  Temp:  [98.6 °F (37 °C)-99.8 °F (37.7 °C)] 99.8 °F (37.7 °C)  Pulse:  [] 102  Resp:  [16-18] 16  SpO2:  [92 %-98 %] 92 %  BP: ()/(47-59) 100/51           There is no height or weight on file to calculate BMI.      Intake/Output Summary (Last 24 hours) at 6/24/2023 0700  Last data filed at 6/23/2023 1855  Gross per 24 hour   Intake --   Output 100 ml   Net -100 ml          Ortho/SPM Exam   Awake/alert/oriented x3, No acute distress, Afebrile, Vital signs stable  Good inspiratory effort with unlaboured breathing  Dressings c/d/I  Drain in place  NVI BUE/BLE    Significant Labs: All pertinent labs within the past 24 hours have been reviewed.    Significant Imaging: I have reviewed all pertinent imaging results/findings.

## 2023-06-24 NOTE — PT/OT/SLP PROGRESS
Physical Therapy Treatment    Patient Name:  Joyce Peterson   MRN:  [id number]    Recommendations:     Discharge Recommendations: other (see comments)  Discharge Equipment Recommendations: walker, rolling  Barriers to discharge: Inaccessible home    Assessment:     Joyce Peterson is a 62 y.o. female admitted with a medical diagnosis of Lumbar stenosis.  She presents with the following impairments/functional limitations: weakness, impaired endurance, gait instability, impaired balance, decreased lower extremity function, orthopedic precautions, impaired functional mobility, pain. Focused on functional mobility and strengthening this session. Pt would benefit from continued skilled PT intervention to address deficits and goals.      Rehab Prognosis: Good; patient would benefit from acute skilled PT services to address these deficits and reach maximum level of function.    Recent Surgery: Procedure(s) (LRB):  FUSION, SPINE, LUMBAR, TLIF, POSTERIOR APPROACH, USING PEDICLE SCREW L4/5 spinewave SNS:SSEP/EMG (N/A) 2 Days Post-Op    Plan:     During this hospitalization, patient to be seen 4 x/week to address the identified rehab impairments via gait training, therapeutic activities, therapeutic exercises, neuromuscular re-education and progress toward the following goals:    Plan of Care Expires:  07/23/23    Subjective     Chief Complaint: pain  Patient/Family Comments/goals: to go home  Pain/Comfort:  Pain Rating 1:  (unrated)  Location - Side 1: Bilateral  Location - Orientation 1: generalized  Location 1: back  Pain Addressed 1: Reposition, Distraction, Pre-medicate for activity  Pain Rating Post-Intervention 1:  (unrated)      Objective:     Communicated with RN prior to session.  Patient found  in bathroom  with hemovac upon PT entry to room.     General Precautions: Standard, fall  Orthopedic Precautions: spinal precautions  Braces: N/A  Respiratory Status: Room air     Functional Mobility:  Transfers:     Sit  to Stand:  contact guard assistance with rolling walker  Gait: Pt ambulated 100' with RW and CGA from therapist. Followed with chair. Pt demonstrated decreased step length, slow and unsteady gait.       AM-PAC 6 CLICK MOBILITY  Turning over in bed (including adjusting bedclothes, sheets and blankets)?: 3  Sitting down on and standing up from a chair with arms (e.g., wheelchair, bedside commode, etc.): 3  Moving from lying on back to sitting on the side of the bed?: 3  Moving to and from a bed to a chair (including a wheelchair)?: 3  Need to walk in hospital room?: 3  Climbing 3-5 steps with a railing?: 2  Basic Mobility Total Score: 17       Treatment & Education:  Pt performed seated marches and LAQs x 10 reps each leg.    Pt educated on:  - Role of PT and POC/goals for therapy   - Safety with mobility and fall risk   - Instructed to call nursing staff for assistance with mobility as needed   -Tips to reduce fall risk  -spinal precautions  -energy conservation techniques      Patient left up in chair with all lines intact, call button in reach, RN notified, and spouse present..    GOALS:   Multidisciplinary Problems       Physical Therapy Goals          Problem: Physical Therapy    Goal Priority Disciplines Outcome Goal Variances Interventions   Physical Therapy Goal     PT, PT/OT Ongoing, Progressing     Description: Goals to met by 7/7/2023    1. Supine to sit with Modified [place]  2. Sit to supine with Modified [place]  3. Rolling to Left and Right with Modified [place].  4. Sit to stand transfer with Stand-by Assistance  5. Bed to chair transfer with Stand-by Assistance using Rolling Walker  6. Gait  x 150 feet with Contact Guard Assistance using LRAD   7. Ascend/descend 5 stair(s) with right Handrails Contact Guard Assistance using No Assistive Device.   8. Lower extremity exercise program x15 reps per Instruction, with assistance as needed in order to facilitate improved strength, improved  postural control, and improvement in functional independence                         Time Tracking:     PT Received On: 06/24/23  PT Start Time: 0958     PT Stop Time: 1023  PT Total Time (min): 25 min     Billable Minutes: Gait Training 15 mins and Therapeutic Exercise 10 mins    Treatment Type: Treatment  PT/PTA: PTA     Number of PTA visits since last PT visit: 1 06/24/2023

## 2023-06-24 NOTE — PROGRESS NOTES
[facility] Medicine  Progress Note    Patient Name: Joyce Peterson  MRN: [id number]  Patient Class: IP- Inpatient   Admission Date: 6/22/2023  Length of Stay: 2 days  Attending Physician: Jh Mosqueda MD  Primary Care Provider: Krystal Singleton DO        Subjective:     Principal Problem:Lumbar stenosis        HPI:  62 y.o F with RA (on leflunomide and prednisone), Sjogren's, steroid induced osteoporosis, GERD, HLD, CAD, asthma (on symbicort) admitted for L4/L5 TLIF which she completed on 6/22    Medicine consulted for wheezing/crackles. On evaluation, patient in no acute distress. Comfortable, no increasaed WOB, saturating well on room air. She did endorse wheezing earlier that morning after a coughing fit. She has hx of asthma and her home inhaler was not restarted. She has been having > 2 month history of BLE edema that she contributes to sleeping in a recliner (2/2 back pain, not SOB). Denies hx of heart failure. Last TTE in 2020 with no systolic or diastolic dysfunction.       Overview/Hospital Course:  Patient admitted for L4/L5 TLIF which she completed on 6/22. Medicine consulted for respiratory status, wheezing/crackles auscultated by primary team. Patient clear to auscultation on exam. Home inhaler restarted, duonebs ordered. Lasix 20 IVP x1 ordered for BLE edema (likely secondary to venous insufficiency), now improved. BNP < 100. TTE pending.       Interval History: NAEON. No UOP documented in chart, but patient reports brisk diuresis overnight. BLE edema much improved this morning. Lungs continue to be clear to auscultation. Patient has restarted home inhaler. BNP 59. TTE pending. K 2.9 this morning, repletion ordered. Remainder of labs WNL.     Review of Systems   Constitutional:  Negative for chills and fever.   Eyes:  Negative for visual disturbance.   Respiratory:  Negative for shortness of breath.    Cardiovascular:  Positive for leg swelling. Negative for chest pain.    Genitourinary:  Negative for dysuria.   Musculoskeletal:  Positive for back pain.   Objective:     Vital Signs (Most Recent):  Temp: 98.8 °F (37.1 °C) (06/24/23 1214)  Pulse: 98 (06/24/23 1214)  Resp: 16 (06/24/23 1316)  BP: (!) 111/53 (06/24/23 1214)  SpO2: (!) 92 % (06/24/23 1214) Vital Signs (24h Range):  Temp:  [98.8 °F (37.1 °C)-99.8 °F (37.7 °C)] 98.8 °F (37.1 °C)  Pulse:  [] 98  Resp:  [16-18] 16  SpO2:  [92 %-97 %] 92 %  BP: ()/(47-59) 111/53        There is no height or weight on file to calculate BMI.    Intake/Output Summary (Last 24 hours) at 6/24/2023 1413  Last data filed at 6/24/2023 1234  Gross per 24 hour   Intake 400 ml   Output 177 ml   Net 223 ml         Physical Exam  Constitutional:       Appearance: Normal appearance. She is not ill-appearing.   HENT:      Head: Normocephalic and atraumatic.      Mouth/Throat:      Mouth: Mucous membranes are moist.      Pharynx: Oropharynx is clear.   Eyes:      Extraocular Movements: Extraocular movements intact.      Conjunctiva/sclera: Conjunctivae normal.   Neck:      Comments: No JVD appreciated  Cardiovascular:      Rate and Rhythm: Normal rate and regular rhythm.      Pulses: Normal pulses.      Heart sounds: Normal heart sounds.   Pulmonary:      Effort: Pulmonary effort is normal.      Breath sounds: Normal breath sounds.      Comments: Lungs clear to auscultation bilaterally  Abdominal:      General: Bowel sounds are normal.      Palpations: Abdomen is soft.      Tenderness: There is no abdominal tenderness.   Musculoskeletal:      Cervical back: Normal range of motion.      Comments: 1+ BLE pitting edema to level of knees, improved from prior. RLE weeping improved as well.    Skin:     General: Skin is warm and dry.      Capillary Refill: Capillary refill takes less than 2 seconds.   Neurological:      General: No focal deficit present.      Mental Status: She is alert.   Psychiatric:         Mood and Affect: Mood normal.            Significant Labs: All pertinent labs within the past 24 hours have been reviewed.  CBC:   Recent Labs   Lab 06/23/23  0535 06/24/23  0936   WBC 4.64 5.44   HGB 8.4* 8.7*   HCT 26.6* 27.8*    185     CMP:   Recent Labs   Lab 06/22/23  1915 06/23/23  0534 06/24/23  0936    141 140   K 3.2* 3.4* 2.9*   * 111* 106   CO2 20* 26 27    75 103   BUN 6* 7* 7*   CREATININE 0.5 0.7 0.6   CALCIUM 6.7* 7.5* 7.5*   PROT 4.7*  --   --    ALBUMIN 2.8*  --   --    BILITOT 0.2  --   --    ALKPHOS 108  --   --    AST 88*  --   --    ALT 65*  --   --    ANIONGAP 8 4* 7*       Significant Imaging: I have reviewed all pertinent imaging results/findings within the past 24 hours.      Assessment/Plan:      * Lumbar stenosis  S/p L4/L5 TLIF on 6/22    - Management per primary team      Hypokalemia  Hypokalemic on arrival (3.2), but more so on AM labs (2.9)  Likely exacerbated by lasix administration    - 120mEq repletion ordered  - F/u BMP at 6pm to ensure resolution of hypokalemia  - Daily BMP while admitted      Lower extremity edema  Mild intermittent asthma without complication  62 y.o F with RA (on leflunomide and prednisone), Sjogren's, steroid induced osteoporosis, GERD, HLD, CAD, asthma (on symbicort) admitted for L4/L5 TLIF which she completed on 6/22    Medicine consulted for wheezing/crackles. On evaluation, patient in no acute distress. Comfortable, no increasaed WOB, saturating well on room air. She did endorse wheezing earlier that morning after a coughing fit. She has hx of asthma and her home inhaler was not restarted. She has been having > 2 month history of BLE edema that she contributes to sleeping in a recliner (2/2 back pain, not SOB). Denies hx of heart failure. Last TTE in 2020 with no systolic or diastolic dysfunction.     Lungs clear to auscultation on exam. 1+ pitting BLE edema to level of lower thighs.   CXR showed mild course interstitial attenuation, not concerning  BNP 59, also not  consistent with ADHF    Patient with large volume UOP s/p IV lasix 20mg. BLE edema much improved on exam today. Continues to have clear lungs. No need for additional IVP lasix.     Recommendations:  - Patient's home inhaler symbicort not on formulary, Breo started which has similar components (inhaled corticosteroids/LABA)  - Placed order for duonebs q4h prn while awake  - One of patient's triggers is GERD, please ensure PPI or H2 blocker administered  - Last TTE in 2020 negative for diastolic/systolic dysfunction, but will obtain new TTE out of abundance of caution. High suspicion BLE edema 2/2 venous insufficiency as patient reports edema is largely dependent. Will follow up results of TTE  - Continue home PO 20mg afterwards.   - Wound care consulted for RLE weeping noted    Thank you for this consult. We will sign off. Please reach out with any questions or concerns. Will follow up TTE and reach out if any evidence of heart failure noted.         VTE Risk Mitigation (From admission, onward)         Ordered     Place CAPO hose  Until discontinued         06/23/23 1612     heparin (porcine) injection 5,000 Units  Every 8 hours         06/22/23 1615     IP VTE HIGH RISK PATIENT  Once         06/22/23 1615     Place sequential compression device  Until discontinued         06/22/23 1224                Discharge Planning   STEW: 6/24/2023     Code Status: Full Code   Is the patient medically ready for discharge?:     Reason for patient still in hospital (select all that apply): Patient trending condition  Discharge Plan A: Home with family, Home Health        Cindy Cosby MD  Department of Hospital Medicine   [facility]

## 2023-06-24 NOTE — ASSESSMENT & PLAN NOTE
62 y.o F with RA (on leflunomide and prednisone), Sjogren's, steroid induced osteoporosis, GERD, HLD, CAD, asthma (on symbicort) admitted for L4/L5 TLIF which she completed on 6/22    Medicine consulted for wheezing/crackles. On evaluation, patient in no acute distress. Comfortable, no increasaed WOB, saturating well on room air. She did endorse wheezing earlier that morning after a coughing fit. She has hx of asthma and her home inhaler was not restarted. She has been having > 2 month history of BLE edema that she contributes to sleeping in a recliner (2/2 back pain, not SOB). Denies hx of heart failure. Last TTE in 2020 with no systolic or diastolic dysfunction.     Lungs clear to auscultation on exam. 1+ pitting BLE edema to level of lower thighs.   CXR showed mild course interstitial attenuation, not concerning  BNP 59, also not consistent with ADHF    Patient with large volume UOP s/p IV lasix 20mg. BLE edema much improved on exam today. Continues to have clear lungs. No need for additional IVP lasix.     Recommendations:  - Patient's home inhaler symbicort not on formulary, Breo started which has similar components (inhaled corticosteroids/LABA)  - Placed order for duonebs q4h prn while awake  - One of patient's triggers is GERD, please ensure PPI or H2 blocker administered  - Last TTE in 2020 negative for diastolic/systolic dysfunction, but will obtain new TTE out of abundance of caution. High suspicion BLE edema 2/2 venous insufficiency as patient reports edema is largely dependent. Will follow up results of TTE  - Continue home PO 20mg afterwards.   - Wound care consulted for RLE weeping noted    Thank you for this consult. We will sign off. Please reach out with any questions or concerns. Will follow up TTE and reach out if any evidence of heart failure noted.

## 2023-06-24 NOTE — NURSING
Dr Mckeon notified patient did not received Her ECHO . Also patient c/o right leg pain noted weeping clear drainage and red below knee .

## 2023-06-24 NOTE — ASSESSMENT & PLAN NOTE
Hypokalemic on arrival (3.2), but more so on AM labs (2.9)  Likely exacerbated by lasix administration    - 120mEq repletion ordered  - F/u BMP at 6pm to ensure resolution of hypokalemia  - Daily BMP while admitted

## 2023-06-24 NOTE — PROGRESS NOTES
"[facility]  Orthopedics  Progress Note    Patient Name: Joyce Peterson  MRN: [id number]  Admission Date: 6/22/2023  Hospital Length of Stay: 2 days  Attending Provider: Jh Mosqueda MD  Primary Care Provider: Krystal Singleton,   Follow-up For: Procedure(s) (LRB):  FUSION, SPINE, LUMBAR, TLIF, POSTERIOR APPROACH, USING PEDICLE SCREW L4/5 spinewave SNS:SSEP/EMG (N/A)    Post-Operative Day: 2 Days Post-Op  Subjective:     Principal Problem:Lumbar stenosis    Principal Orthopedic Problem: s/p L4/L5 TLIF on 6/22    Interval History: HM following for respiratory eval. Wound care consulted for RLE weeping, eval pending. BNP 59, echo pending. Reports pain to her lower back is muscular in nature at this time. 12 feet x 2 with PT. BP soft with 100's systolic. Drain output 100/24 hours. Overnight pending.     Review of patient's allergies indicates:   Allergen Reactions    Actemra [tocilizumab] Other (See Comments)     Severe dizziness    Codeine Nausea And Vomiting and Hives    Gold au 198 Hives and Rash    Hydroxychloroquine Other (See Comments)     Can't remember the reaction      Iodinated contrast media Other (See Comments)     Other reaction(s): BURNING ALL OVER    Iodine Other (See Comments) and Hives     Other reaction(s): BURNING ALL OVER - Iodine dye - Not topical    Ondansetron Nausea And Vomiting    Sulfa (sulfonamide antibiotics) Other (See Comments)     Can't remember the reaction    Zofran [ondansetron hcl (pf)] Nausea And Vomiting     Pt reports that last time she received zofran she started vomiting again    Methotrexate analogues Nausea Only    Pneumococcal vaccine Other (See Comments)    Pneumovax 23 [pneumococcal 23-argenis ps vaccine] Other (See Comments)     "sick"    Methotrexate Nausea Only       Current Facility-Administered Medications   Medication    acetaminophen tablet 650 mg    albuterol-ipratropium 2.5 mg-0.5 mg/3 mL nebulizer solution 3 mL    atorvastatin tablet 80 " mg    famotidine tablet 20 mg    fluticasone furoate-vilanteroL 100-25 mcg/dose diskus inhaler 1 puff    furosemide tablet 20 mg    heparin (porcine) injection 5,000 Units    HYDROmorphone tablet 2 mg    And    HYDROmorphone tablet 4 mg    leflunomide tablet 20 mg    methocarbamoL tablet 750 mg    mupirocin 2 % ointment    oxybutynin 24 hr tablet 5 mg    oxyCODONE immediate release tablet 5 mg    oxyCODONE immediate release tablet Tab 10 mg    polyethylene glycol packet 17 g    pregabalin capsule 100 mg    promethazine tablet 25 mg    senna-docusate 8.6-50 mg per tablet 1 tablet    sodium chloride 0.9% flush 5 mL     Objective:     Vital Signs (Most Recent):  Temp: 99.8 °F (37.7 °C) (06/24/23 0505)  Pulse: 102 (06/24/23 0505)  Resp: 16 (06/24/23 0505)  BP: (!) 100/51 (06/24/23 0505)  SpO2: (!) 92 % (06/24/23 0505) Vital Signs (24h Range):  Temp:  [98.6 °F (37 °C)-99.8 °F (37.7 °C)] 99.8 °F (37.7 °C)  Pulse:  [] 102  Resp:  [16-18] 16  SpO2:  [92 %-98 %] 92 %  BP: ()/(47-59) 100/51           There is no height or weight on file to calculate BMI.      Intake/Output Summary (Last 24 hours) at 6/24/2023 0700  Last data filed at 6/23/2023 1855  Gross per 24 hour   Intake --   Output 100 ml   Net -100 ml         Ortho/SPM Exam   Awake/alert/oriented x3, No acute distress, Afebrile, Vital signs stable  Good inspiratory effort with unlaboured breathing  Dressings c/d/I  Drain in place  NVI BUE/BLE    Significant Labs: All pertinent labs within the past 24 hours have been reviewed.    Significant Imaging: I have reviewed all pertinent imaging results/findings.    Assessment/Plan:     * Lumbar stenosis  Joyce Peterson is a 62 y.o. female s/p L4/L5 TLIF on 6/22      Pain control: MM  PT/OT: WBAT BLE  DVT PPx: heparin 5k tid, SCDs at all times when not ambulating  Abx: postop Ancef  Labs: stable  Drain: 100cc since OR  Cervantes: dced    Dispo: f/u PT recs          Rey Mckeon,  MD  Orthopedics  [facility]

## 2023-06-24 NOTE — HOSPITAL COURSE
Patient admitted for L4/L5 TLIF which she completed on 6/22. Medicine consulted for respiratory status, wheezing/crackles auscultated by primary team. Patient clear to auscultation on exam. Home inhaler restarted, duonebs ordered. Lasix 20 IVP x1 ordered for BLE edema (likely secondary to venous insufficiency), now improved. BNP < 100. TTE pending.

## 2023-06-24 NOTE — ASSESSMENT & PLAN NOTE
Joyce RAMU JacobsenWewoka is a 62 y.o. female s/p L4/L5 TLIF on 6/22      Pain control: MM  PT/OT: WBAT BLE  DVT PPx: heparin 5k tid, SCDs at all times when not ambulating  Abx: postop Ancef  Labs: stable  Drain: 100cc since OR  Cervantes: dced    Dispo: f/u PT recs

## 2023-06-24 NOTE — SUBJECTIVE & OBJECTIVE
Interval History: NAEON. No UOP documented in chart, but patient reports brisk diuresis overnight. BLE edema much improved this morning. Lungs continue to be clear to auscultation. Patient has restarted home inhaler. BNP 59. TTE pending. K 2.9 this morning, repletion ordered. Remainder of labs WNL.     Review of Systems   Constitutional:  Negative for chills and fever.   Eyes:  Negative for visual disturbance.   Respiratory:  Negative for shortness of breath.    Cardiovascular:  Positive for leg swelling. Negative for chest pain.   Genitourinary:  Negative for dysuria.   Musculoskeletal:  Positive for back pain.   Objective:     Vital Signs (Most Recent):  Temp: 98.8 °F (37.1 °C) (06/24/23 1214)  Pulse: 98 (06/24/23 1214)  Resp: 16 (06/24/23 1316)  BP: (!) 111/53 (06/24/23 1214)  SpO2: (!) 92 % (06/24/23 1214) Vital Signs (24h Range):  Temp:  [98.8 °F (37.1 °C)-99.8 °F (37.7 °C)] 98.8 °F (37.1 °C)  Pulse:  [] 98  Resp:  [16-18] 16  SpO2:  [92 %-97 %] 92 %  BP: ()/(47-59) 111/53        There is no height or weight on file to calculate BMI.    Intake/Output Summary (Last 24 hours) at 6/24/2023 1413  Last data filed at 6/24/2023 1234  Gross per 24 hour   Intake 400 ml   Output 177 ml   Net 223 ml         Physical Exam  Constitutional:       Appearance: Normal appearance. She is not ill-appearing.   HENT:      Head: Normocephalic and atraumatic.      Mouth/Throat:      Mouth: Mucous membranes are moist.      Pharynx: Oropharynx is clear.   Eyes:      Extraocular Movements: Extraocular movements intact.      Conjunctiva/sclera: Conjunctivae normal.   Neck:      Comments: No JVD appreciated  Cardiovascular:      Rate and Rhythm: Normal rate and regular rhythm.      Pulses: Normal pulses.      Heart sounds: Normal heart sounds.   Pulmonary:      Effort: Pulmonary effort is normal.      Breath sounds: Normal breath sounds.      Comments: Lungs clear to auscultation bilaterally  Abdominal:      General: Bowel  sounds are normal.      Palpations: Abdomen is soft.      Tenderness: There is no abdominal tenderness.   Musculoskeletal:      Cervical back: Normal range of motion.      Comments: 1+ BLE pitting edema to level of knees, improved from prior. RLE weeping improved as well.    Skin:     General: Skin is warm and dry.      Capillary Refill: Capillary refill takes less than 2 seconds.   Neurological:      General: No focal deficit present.      Mental Status: She is alert.   Psychiatric:         Mood and Affect: Mood normal.           Significant Labs: All pertinent labs within the past 24 hours have been reviewed.  CBC:   Recent Labs   Lab 06/23/23  0535 06/24/23  0936   WBC 4.64 5.44   HGB 8.4* 8.7*   HCT 26.6* 27.8*    185     CMP:   Recent Labs   Lab 06/22/23  1915 06/23/23  0534 06/24/23  0936    141 140   K 3.2* 3.4* 2.9*   * 111* 106   CO2 20* 26 27    75 103   BUN 6* 7* 7*   CREATININE 0.5 0.7 0.6   CALCIUM 6.7* 7.5* 7.5*   PROT 4.7*  --   --    ALBUMIN 2.8*  --   --    BILITOT 0.2  --   --    ALKPHOS 108  --   --    AST 88*  --   --    ALT 65*  --   --    ANIONGAP 8 4* 7*       Significant Imaging: I have reviewed all pertinent imaging results/findings within the past 24 hours.

## 2023-06-24 NOTE — PT/OT/SLP PROGRESS
Occupational Therapy      Patient Name:  Joyce Peterson   MRN:  [id number]    Patient not seen today secondary to MD team on 1st attempt and Pt deferring on 2nd attempt 2/2 family visitation. Pt reported participating in PT earlier and agreeable to OT session tomorrow. C&D called to deliver RW for in patient room utilization while admitted. Will follow-up next scheduled visit per OT POC. [phone]    6/24/2023

## 2023-06-25 LAB
ASCENDING AORTA: 3.51 CM
AV INDEX (PROSTH): 1.2
AV MEAN GRADIENT: 3 MMHG
AV PEAK GRADIENT: 5 MMHG
AV VALVE AREA: 3.8 CM2
AV VELOCITY RATIO: 1.15
CV ECHO LV RWT: 0.41 CM
DOP CALC AO PEAK VEL: 1.07 M/S
DOP CALC AO VTI: 18.14 CM
DOP CALC LVOT AREA: 3.2 CM2
DOP CALC LVOT DIAMETER: 2.01 CM
DOP CALC LVOT PEAK VEL: 1.23 M/S
DOP CALC LVOT STROKE VOLUME: 68.98 CM3
DOP CALCLVOT PEAK VEL VTI: 21.75 CM
ECHO LV POSTERIOR WALL: 0.85 CM (ref 0.6–1.1)
EJECTION FRACTION: 65 %
FRACTIONAL SHORTENING: 29 % (ref 28–44)
INTERVENTRICULAR SEPTUM: 0.97 CM (ref 0.6–1.1)
LA MAJOR: 4.81 CM
LA MINOR: 4.34 CM
LA WIDTH: 3.88 CM
LEFT ATRIUM SIZE: 3.63 CM
LEFT ATRIUM VOLUME: 54.63 CM3
LEFT INTERNAL DIMENSION IN SYSTOLE: 2.96 CM (ref 2.1–4)
LEFT VENTRICLE DIASTOLIC VOLUME: 77.25 ML
LEFT VENTRICLE SYSTOLIC VOLUME: 33.85 ML
LEFT VENTRICULAR INTERNAL DIMENSION IN DIASTOLE: 4.17 CM (ref 3.5–6)
LEFT VENTRICULAR MASS: 119.09 G
PISA TR MAX VEL: 2.52 M/S
RA MAJOR: 4.44 CM
RA PRESSURE: 3 MMHG
RA WIDTH: 3.38 CM
RIGHT VENTRICULAR END-DIASTOLIC DIMENSION: 2.63 CM
SINUS: 3.22 CM
STJ: 3.21 CM
TDI LATERAL: 0.12 M/S
TDI SEPTAL: 0.12 M/S
TDI: 0.12 M/S
TR MAX PG: 25 MMHG
TRICUSPID ANNULAR PLANE SYSTOLIC EXCURSION: 2.35 CM
TV REST PULMONARY ARTERY PRESSURE: 28 MMHG

## 2023-06-25 PROCEDURE — 25000242 PHARM REV CODE 250 ALT 637 W/ HCPCS

## 2023-06-25 PROCEDURE — 11000001 HC ACUTE MED/SURG PRIVATE ROOM

## 2023-06-25 PROCEDURE — 63600175 PHARM REV CODE 636 W HCPCS: Performed by: PHYSICIAN ASSISTANT

## 2023-06-25 PROCEDURE — 25000003 PHARM REV CODE 250: Performed by: PHYSICIAN ASSISTANT

## 2023-06-25 RX ADMIN — HYDROMORPHONE HYDROCHLORIDE 4 MG: 2 TABLET ORAL at 01:06

## 2023-06-25 RX ADMIN — ACETAMINOPHEN 650 MG: 325 TABLET ORAL at 07:06

## 2023-06-25 RX ADMIN — HYDROMORPHONE HYDROCHLORIDE 2 MG: 2 TABLET ORAL at 01:06

## 2023-06-25 RX ADMIN — HEPARIN SODIUM 5000 UNITS: 5000 INJECTION INTRAVENOUS; SUBCUTANEOUS at 04:06

## 2023-06-25 RX ADMIN — PREGABALIN 100 MG: 50 CAPSULE ORAL at 08:06

## 2023-06-25 RX ADMIN — HYDROMORPHONE HYDROCHLORIDE 4 MG: 2 TABLET ORAL at 08:06

## 2023-06-25 RX ADMIN — HYDROMORPHONE HYDROCHLORIDE 4 MG: 2 TABLET ORAL at 04:06

## 2023-06-25 RX ADMIN — HYDROMORPHONE HYDROCHLORIDE 4 MG: 2 TABLET ORAL at 07:06

## 2023-06-25 RX ADMIN — ACETAMINOPHEN 650 MG: 325 TABLET ORAL at 08:06

## 2023-06-25 RX ADMIN — PROMETHAZINE HYDROCHLORIDE 25 MG: 12.5 TABLET ORAL at 07:06

## 2023-06-25 RX ADMIN — ACETAMINOPHEN 650 MG: 325 TABLET ORAL at 02:06

## 2023-06-25 RX ADMIN — MUPIROCIN: 20 OINTMENT TOPICAL at 08:06

## 2023-06-25 RX ADMIN — HYDROMORPHONE HYDROCHLORIDE 4 MG: 2 TABLET ORAL at 10:06

## 2023-06-25 RX ADMIN — METHOCARBAMOL 750 MG: 750 TABLET ORAL at 01:06

## 2023-06-25 RX ADMIN — OXYBUTYNIN CHLORIDE 5 MG: 5 TABLET, EXTENDED RELEASE ORAL at 08:06

## 2023-06-25 RX ADMIN — POLYETHYLENE GLYCOL 3350 17 G: 17 POWDER, FOR SOLUTION ORAL at 08:06

## 2023-06-25 RX ADMIN — HYDROMORPHONE HYDROCHLORIDE 2 MG: 2 TABLET ORAL at 05:06

## 2023-06-25 RX ADMIN — LEFLUNOMIDE 20 MG: 20 TABLET ORAL at 08:06

## 2023-06-25 RX ADMIN — HEPARIN SODIUM 5000 UNITS: 5000 INJECTION INTRAVENOUS; SUBCUTANEOUS at 10:06

## 2023-06-25 RX ADMIN — SENNOSIDES AND DOCUSATE SODIUM 1 TABLET: 50; 8.6 TABLET ORAL at 09:06

## 2023-06-25 RX ADMIN — METHOCARBAMOL 750 MG: 750 TABLET ORAL at 05:06

## 2023-06-25 RX ADMIN — PREGABALIN 100 MG: 50 CAPSULE ORAL at 02:06

## 2023-06-25 RX ADMIN — ATORVASTATIN CALCIUM 80 MG: 40 TABLET, FILM COATED ORAL at 08:06

## 2023-06-25 RX ADMIN — PROMETHAZINE HYDROCHLORIDE 25 MG: 12.5 TABLET ORAL at 08:06

## 2023-06-25 RX ADMIN — METHOCARBAMOL 750 MG: 750 TABLET ORAL at 08:06

## 2023-06-25 RX ADMIN — FUROSEMIDE 20 MG: 20 TABLET ORAL at 08:06

## 2023-06-25 RX ADMIN — HEPARIN SODIUM 5000 UNITS: 5000 INJECTION INTRAVENOUS; SUBCUTANEOUS at 01:06

## 2023-06-25 NOTE — ASSESSMENT & PLAN NOTE
Joyce RAMU WuFlorien is a 62 y.o. female s/p L4/L5 TLIF on 6/22      Pain control: MM  PT/OT: WBAT BLE  DVT PPx: heparin 5k tid, SCDs at all times when not ambulating  Abx: completed  Labs: stable  Drain: no new output  Cervantes: dced    Dispo: f/u PT recs

## 2023-06-25 NOTE — PLAN OF CARE
Plan of care reviewed and updated . Pt AA+O . Pt's pain is managed with medication ordered at this timed . Pt's V/S are as charted . No falls this shift. . Pt is oriented to room and call system . Will continue to monitor . .c

## 2023-06-25 NOTE — PT/OT/SLP PROGRESS
Occupational Therapy      Patient Name:  Joyce Peterson   MRN:  [id number]    Patient not seen today secondary to declining. Per patients report she has be getting up going the bathroom with no problem. Will follow-up Per plan of care. Pt educated on role of occupational therapy, importance of OOB, and safety during ADLs and functional mobility.     Susana Carballo  6/25/2023

## 2023-06-25 NOTE — NURSING
"Patient stated,  " I will like to talk to Dr reference to my  results of  my test , my right lower leg. Dr Mckeon was going to place order for U/A due to elevated temp last night .patient stated, " I have little burning , when I urinate but no odor."Paged resident on call for Dr Mosqueda .  "

## 2023-06-25 NOTE — SUBJECTIVE & OBJECTIVE
"Principal Problem:Lumbar stenosis    Principal Orthopedic Problem: s/p L4/L5 TLIF on 6/22    Interval History: Echo and wound care consult pending. Edema improved. Reports left sided rib pain with inspiration. Hgb 8.7.  Febrile to 100.3 and 100.1 overnight. No new output from drains. Will order UA and continue IS/respiratory eval.    Review of patient's allergies indicates:   Allergen Reactions    Actemra [tocilizumab] Other (See Comments)     Severe dizziness    Codeine Nausea And Vomiting and Hives    Gold au 198 Hives and Rash    Hydroxychloroquine Other (See Comments)     Can't remember the reaction      Iodinated contrast media Other (See Comments)     Other reaction(s): BURNING ALL OVER    Iodine Other (See Comments) and Hives     Other reaction(s): BURNING ALL OVER - Iodine dye - Not topical    Ondansetron Nausea And Vomiting    Sulfa (sulfonamide antibiotics) Other (See Comments)     Can't remember the reaction    Zofran [ondansetron hcl (pf)] Nausea And Vomiting     Pt reports that last time she received zofran she started vomiting again    Methotrexate analogues Nausea Only    Pneumococcal vaccine Other (See Comments)    Pneumovax 23 [pneumococcal 23-argenis ps vaccine] Other (See Comments)     "sick"    Methotrexate Nausea Only       Current Facility-Administered Medications   Medication    acetaminophen tablet 650 mg    albuterol-ipratropium 2.5 mg-0.5 mg/3 mL nebulizer solution 3 mL    atorvastatin tablet 80 mg    famotidine tablet 20 mg    fluticasone furoate-vilanteroL 100-25 mcg/dose diskus inhaler 1 puff    furosemide tablet 20 mg    heparin (porcine) injection 5,000 Units    HYDROmorphone tablet 2 mg    And    HYDROmorphone tablet 4 mg    leflunomide tablet 20 mg    methocarbamoL tablet 750 mg    mupirocin 2 % ointment    oxybutynin 24 hr tablet 5 mg    oxyCODONE immediate release tablet 5 mg    oxyCODONE immediate release tablet Tab 10 mg    polyethylene glycol packet 17 g    pregabalin capsule 100 mg "    promethazine tablet 25 mg    senna-docusate 8.6-50 mg per tablet 1 tablet    sodium chloride 0.9% flush 5 mL     Objective:     Vital Signs (Most Recent):  Temp: 100.1 °F (37.8 °C) (06/25/23 0502)  Pulse: 96 (06/25/23 0502)  Resp: 15 (06/25/23 0732)  BP: (!) 107/54 (06/25/23 0502)  SpO2: 95 % (06/25/23 0502) Vital Signs (24h Range):  Temp:  [98.4 °F (36.9 °C)-100.3 °F (37.9 °C)] 100.1 °F (37.8 °C)  Pulse:  [93-98] 96  Resp:  [15-20] 15  SpO2:  [92 %-100 %] 95 %  BP: (102-116)/(53-66) 107/54           There is no height or weight on file to calculate BMI.      Intake/Output Summary (Last 24 hours) at 6/25/2023 0803  Last data filed at 6/24/2023 1234  Gross per 24 hour   Intake 200 ml   Output 2 ml   Net 198 ml          Ortho/SPM Exam   Awake/alert/oriented x3, No acute distress, Afebrile, Vital signs stable  Good inspiratory effort with unlaboured breathing  Dressings c/d/I  Drain in place  NVI BUE/BLE    Significant Labs: All pertinent labs within the past 24 hours have been reviewed.    Significant Imaging: I have reviewed all pertinent imaging results/findings.

## 2023-06-25 NOTE — PROGRESS NOTES
"[facility]  Orthopedics  Progress Note    Patient Name: Joyce Peterson  MRN: [id number]  Admission Date: 6/22/2023  Hospital Length of Stay: 3 days  Attending Provider: Jh Mosqueda MD  Primary Care Provider: Krystal Singleton,   Follow-up For: Procedure(s) (LRB):  FUSION, SPINE, LUMBAR, TLIF, POSTERIOR APPROACH, USING PEDICLE SCREW L4/5 spinewave SNS:SSEP/EMG (N/A)    Post-Operative Day: 3 Days Post-Op  Subjective:     Principal Problem:Lumbar stenosis    Principal Orthopedic Problem: s/p L4/L5 TLIF on 6/22    Interval History: Echo and wound care consult pending. Edema improved. Reports left sided rib pain with inspiration. Hgb 8.7.  Febrile to 100.3 and 100.1 overnight. No new output from drains. Will order UA and continue IS/respiratory eval.    Review of patient's allergies indicates:   Allergen Reactions    Actemra [tocilizumab] Other (See Comments)     Severe dizziness    Codeine Nausea And Vomiting and Hives    Gold au 198 Hives and Rash    Hydroxychloroquine Other (See Comments)     Can't remember the reaction      Iodinated contrast media Other (See Comments)     Other reaction(s): BURNING ALL OVER    Iodine Other (See Comments) and Hives     Other reaction(s): BURNING ALL OVER - Iodine dye - Not topical    Ondansetron Nausea And Vomiting    Sulfa (sulfonamide antibiotics) Other (See Comments)     Can't remember the reaction    Zofran [ondansetron hcl (pf)] Nausea And Vomiting     Pt reports that last time she received zofran she started vomiting again    Methotrexate analogues Nausea Only    Pneumococcal vaccine Other (See Comments)    Pneumovax 23 [pneumococcal 23-argenis ps vaccine] Other (See Comments)     "sick"    Methotrexate Nausea Only       Current Facility-Administered Medications   Medication    acetaminophen tablet 650 mg    albuterol-ipratropium 2.5 mg-0.5 mg/3 mL nebulizer solution 3 mL    atorvastatin tablet 80 mg    famotidine tablet 20 mg    fluticasone " furoate-vilanteroL 100-25 mcg/dose diskus inhaler 1 puff    furosemide tablet 20 mg    heparin (porcine) injection 5,000 Units    HYDROmorphone tablet 2 mg    And    HYDROmorphone tablet 4 mg    leflunomide tablet 20 mg    methocarbamoL tablet 750 mg    mupirocin 2 % ointment    oxybutynin 24 hr tablet 5 mg    oxyCODONE immediate release tablet 5 mg    oxyCODONE immediate release tablet Tab 10 mg    polyethylene glycol packet 17 g    pregabalin capsule 100 mg    promethazine tablet 25 mg    senna-docusate 8.6-50 mg per tablet 1 tablet    sodium chloride 0.9% flush 5 mL     Objective:     Vital Signs (Most Recent):  Temp: 100.1 °F (37.8 °C) (06/25/23 0502)  Pulse: 96 (06/25/23 0502)  Resp: 15 (06/25/23 0732)  BP: (!) 107/54 (06/25/23 0502)  SpO2: 95 % (06/25/23 0502) Vital Signs (24h Range):  Temp:  [98.4 °F (36.9 °C)-100.3 °F (37.9 °C)] 100.1 °F (37.8 °C)  Pulse:  [93-98] 96  Resp:  [15-20] 15  SpO2:  [92 %-100 %] 95 %  BP: (102-116)/(53-66) 107/54           There is no height or weight on file to calculate BMI.      Intake/Output Summary (Last 24 hours) at 6/25/2023 0803  Last data filed at 6/24/2023 1234  Gross per 24 hour   Intake 200 ml   Output 2 ml   Net 198 ml         Ortho/SPM Exam   Awake/alert/oriented x3, No acute distress, Afebrile, Vital signs stable  Good inspiratory effort with unlaboured breathing  Dressings c/d/I  Drain in place  NVI BUE/BLE    Significant Labs: All pertinent labs within the past 24 hours have been reviewed.    Significant Imaging: I have reviewed all pertinent imaging results/findings.    Assessment/Plan:     * Lumbar stenosis  Joyce Peterson is a 62 y.o. female s/p L4/L5 TLIF on 6/22      Pain control: MM  PT/OT: WBAT BLE  DVT PPx: heparin 5k tid, SCDs at all times when not ambulating  Abx: completed  Labs: stable  Drain: no new output  Cervantes: sabinod    Dispo: f/u PT recs          Rey Mckeon MD  Orthopedics  [facility] - Surgery

## 2023-06-26 ENCOUNTER — PATIENT MESSAGE (OUTPATIENT)
Dept: ORTHOPEDICS | Facility: CLINIC | Age: 63
End: 2023-06-26
Payer: MEDICARE

## 2023-06-26 LAB
BILIRUB UR QL STRIP: NEGATIVE
CLARITY UR REFRACT.AUTO: CLEAR
COLOR UR AUTO: YELLOW
GLUCOSE UR QL STRIP: NEGATIVE
HGB UR QL STRIP: NEGATIVE
KETONES UR QL STRIP: ABNORMAL
LEUKOCYTE ESTERASE UR QL STRIP: NEGATIVE
NITRITE UR QL STRIP: NEGATIVE
PH UR STRIP: 7 [PH] (ref 5–8)
PROT UR QL STRIP: NEGATIVE
SP GR UR STRIP: 1.01 (ref 1–1.03)
URN SPEC COLLECT METH UR: ABNORMAL

## 2023-06-26 PROCEDURE — 63600175 PHARM REV CODE 636 W HCPCS: Performed by: PHYSICIAN ASSISTANT

## 2023-06-26 PROCEDURE — 25000003 PHARM REV CODE 250: Performed by: STUDENT IN AN ORGANIZED HEALTH CARE EDUCATION/TRAINING PROGRAM

## 2023-06-26 PROCEDURE — 97535 SELF CARE MNGMENT TRAINING: CPT

## 2023-06-26 PROCEDURE — 63600175 PHARM REV CODE 636 W HCPCS: Performed by: STUDENT IN AN ORGANIZED HEALTH CARE EDUCATION/TRAINING PROGRAM

## 2023-06-26 PROCEDURE — 81003 URINALYSIS AUTO W/O SCOPE: CPT | Performed by: STUDENT IN AN ORGANIZED HEALTH CARE EDUCATION/TRAINING PROGRAM

## 2023-06-26 PROCEDURE — 11000001 HC ACUTE MED/SURG PRIVATE ROOM

## 2023-06-26 PROCEDURE — 25000242 PHARM REV CODE 250 ALT 637 W/ HCPCS

## 2023-06-26 PROCEDURE — 97530 THERAPEUTIC ACTIVITIES: CPT

## 2023-06-26 PROCEDURE — 25000003 PHARM REV CODE 250: Performed by: PHYSICIAN ASSISTANT

## 2023-06-26 RX ORDER — CELECOXIB 200 MG/1
200 CAPSULE ORAL DAILY
Status: DISCONTINUED | OUTPATIENT
Start: 2023-06-26 | End: 2023-06-28 | Stop reason: HOSPADM

## 2023-06-26 RX ADMIN — OXYBUTYNIN CHLORIDE 5 MG: 5 TABLET, EXTENDED RELEASE ORAL at 09:06

## 2023-06-26 RX ADMIN — SENNOSIDES AND DOCUSATE SODIUM 1 TABLET: 50; 8.6 TABLET ORAL at 08:06

## 2023-06-26 RX ADMIN — PREGABALIN 100 MG: 50 CAPSULE ORAL at 03:06

## 2023-06-26 RX ADMIN — ACETAMINOPHEN 650 MG: 325 TABLET ORAL at 03:06

## 2023-06-26 RX ADMIN — PROMETHAZINE HYDROCHLORIDE 25 MG: 12.5 TABLET ORAL at 08:06

## 2023-06-26 RX ADMIN — HYDROMORPHONE HYDROCHLORIDE 4 MG: 2 TABLET ORAL at 08:06

## 2023-06-26 RX ADMIN — ACETAMINOPHEN 650 MG: 325 TABLET ORAL at 08:06

## 2023-06-26 RX ADMIN — HEPARIN SODIUM 5000 UNITS: 5000 INJECTION INTRAVENOUS; SUBCUTANEOUS at 01:06

## 2023-06-26 RX ADMIN — SENNOSIDES AND DOCUSATE SODIUM 1 TABLET: 50; 8.6 TABLET ORAL at 09:06

## 2023-06-26 RX ADMIN — MUPIROCIN: 20 OINTMENT TOPICAL at 09:06

## 2023-06-26 RX ADMIN — PREGABALIN 100 MG: 50 CAPSULE ORAL at 09:06

## 2023-06-26 RX ADMIN — METHOCARBAMOL 750 MG: 750 TABLET ORAL at 05:06

## 2023-06-26 RX ADMIN — ATORVASTATIN CALCIUM 80 MG: 40 TABLET, FILM COATED ORAL at 08:06

## 2023-06-26 RX ADMIN — HYDROMORPHONE HYDROCHLORIDE 4 MG: 2 TABLET ORAL at 03:06

## 2023-06-26 RX ADMIN — METHOCARBAMOL 750 MG: 750 TABLET ORAL at 12:06

## 2023-06-26 RX ADMIN — PREGABALIN 100 MG: 50 CAPSULE ORAL at 08:06

## 2023-06-26 RX ADMIN — HYDROMORPHONE HYDROCHLORIDE 4 MG: 2 TABLET ORAL at 05:06

## 2023-06-26 RX ADMIN — HEPARIN SODIUM 5000 UNITS: 5000 INJECTION INTRAVENOUS; SUBCUTANEOUS at 10:06

## 2023-06-26 RX ADMIN — FUROSEMIDE 20 MG: 20 TABLET ORAL at 08:06

## 2023-06-26 RX ADMIN — METHOCARBAMOL 750 MG: 750 TABLET ORAL at 09:06

## 2023-06-26 RX ADMIN — CEFTRIAXONE 2 G: 2 INJECTION, POWDER, FOR SOLUTION INTRAMUSCULAR; INTRAVENOUS at 09:06

## 2023-06-26 RX ADMIN — CELECOXIB 200 MG: 200 CAPSULE ORAL at 08:06

## 2023-06-26 RX ADMIN — HEPARIN SODIUM 5000 UNITS: 5000 INJECTION INTRAVENOUS; SUBCUTANEOUS at 06:06

## 2023-06-26 RX ADMIN — HYDROMORPHONE HYDROCHLORIDE 4 MG: 2 TABLET ORAL at 02:06

## 2023-06-26 RX ADMIN — CEFTRIAXONE 2 G: 2 INJECTION, POWDER, FOR SOLUTION INTRAMUSCULAR; INTRAVENOUS at 07:06

## 2023-06-26 RX ADMIN — FLUTICASONE FUROATE AND VILANTEROL TRIFENATATE 1 PUFF: 100; 25 POWDER RESPIRATORY (INHALATION) at 09:06

## 2023-06-26 RX ADMIN — LEFLUNOMIDE 20 MG: 20 TABLET ORAL at 08:06

## 2023-06-26 RX ADMIN — HYDROMORPHONE HYDROCHLORIDE 4 MG: 2 TABLET ORAL at 11:06

## 2023-06-26 RX ADMIN — POLYETHYLENE GLYCOL 3350 17 G: 17 POWDER, FOR SOLUTION ORAL at 09:06

## 2023-06-26 NOTE — ASSESSMENT & PLAN NOTE
Joyce Peterson is a 62 y.o. female s/p L4/L5 TLIF on 6/22    -UA pending  -Rocephin started for possible R pre tibial cellulitis; will transition to PO and dc home tomorrow if improving.     Pain control: MM  PT/OT: WBAT BLE  DVT PPx: heparin 5k tid, SCDs at all times when not ambulating  Abx: completed  Labs: stable  Drain: dced  Cervantes: dced    Dispo: home with  when medically stable

## 2023-06-26 NOTE — SUBJECTIVE & OBJECTIVE
"Principal Problem:Lumbar stenosis    Principal Orthopedic Problem: s/p L4/L5 TLIF on 6/22    Interval History: NAEON. VSS. Low grade temp to 100.1 this AM. Reporting back pain and area of erythema to right pre tibial region, no fluctuance concerning for abscess at this time. Rocephin started. UA pending.     Review of patient's allergies indicates:   Allergen Reactions    Actemra [tocilizumab] Other (See Comments)     Severe dizziness    Codeine Nausea And Vomiting and Hives    Gold au 198 Hives and Rash    Hydroxychloroquine Other (See Comments)     Can't remember the reaction      Iodinated contrast media Other (See Comments)     Other reaction(s): BURNING ALL OVER    Iodine Other (See Comments) and Hives     Other reaction(s): BURNING ALL OVER - Iodine dye - Not topical    Ondansetron Nausea And Vomiting    Sulfa (sulfonamide antibiotics) Other (See Comments)     Can't remember the reaction    Zofran [ondansetron hcl (pf)] Nausea And Vomiting     Pt reports that last time she received zofran she started vomiting again    Methotrexate analogues Nausea Only    Pneumococcal vaccine Other (See Comments)    Pneumovax 23 [pneumococcal 23-argenis ps vaccine] Other (See Comments)     "sick"    Methotrexate Nausea Only       Current Facility-Administered Medications   Medication    acetaminophen tablet 650 mg    albuterol-ipratropium 2.5 mg-0.5 mg/3 mL nebulizer solution 3 mL    atorvastatin tablet 80 mg    cefTRIAXone (ROCEPHIN) 2 g in dextrose 5 % in water (D5W) 5 % 100 mL IVPB (MB+)    celecoxib capsule 200 mg    famotidine tablet 20 mg    fluticasone furoate-vilanteroL 100-25 mcg/dose diskus inhaler 1 puff    furosemide tablet 20 mg    heparin (porcine) injection 5,000 Units    HYDROmorphone tablet 2 mg    And    HYDROmorphone tablet 4 mg    leflunomide tablet 20 mg    methocarbamoL tablet 750 mg    mupirocin 2 % ointment    oxybutynin 24 hr tablet 5 mg    oxyCODONE immediate release tablet 5 mg    oxyCODONE immediate " release tablet Tab 10 mg    polyethylene glycol packet 17 g    pregabalin capsule 100 mg    promethazine tablet 25 mg    senna-docusate 8.6-50 mg per tablet 1 tablet    sodium chloride 0.9% flush 5 mL     Objective:     Vital Signs (Most Recent):  Temp: 100.1 °F (37.8 °C) (06/26/23 0841)  Pulse: 89 (06/26/23 0841)  Resp: 18 (06/26/23 0845)  BP: (!) 117/58 (06/26/23 0841)  SpO2: 95 % (06/26/23 0841) Vital Signs (24h Range):  Temp:  [99.2 °F (37.3 °C)-100.1 °F (37.8 °C)] 100.1 °F (37.8 °C)  Pulse:  [] 89  Resp:  [15-18] 18  SpO2:  [95 %-97 %] 95 %  BP: (107-124)/(56-61) 117/58           There is no height or weight on file to calculate BMI.      Intake/Output Summary (Last 24 hours) at 6/26/2023 0901  Last data filed at 6/25/2023 1730  Gross per 24 hour   Intake 460 ml   Output --   Net 460 ml          Ortho/SPM Exam   Awake/alert/oriented x3, No acute distress, Afebrile, Vital signs stable  Good inspiratory effort with unlaboured breathing  Dressings c/d/I  2cm x 2cm area of erythema to right pre tibial area; no fluctuance; ttp   NVI BUE/BLE    Significant Labs: All pertinent labs within the past 24 hours have been reviewed.    Significant Imaging: I have reviewed all pertinent imaging results/findings.

## 2023-06-26 NOTE — PLAN OF CARE
[facility] - Surgery    HOME HEALTH ORDERS  FACE TO FACE ENCOUNTER    Patient Name: Joyce Peterson  YOB: 1960    PCP: Krystal Singleton DO   PCP Address: [id number] [place] [address] / Cheri GAMEZ [facility]  PCP Phone Number: [phone]  PCP Fax: [phone]    Encounter Date: 06/26/2023    Admit to Home Health    Diagnoses:  Active Hospital Problems    Diagnosis  POA    *Lumbar stenosis [M48.061]  Yes    Hypokalemia [E87.6]  Yes    Lower extremity edema [R60.0]  Yes    Mild intermittent asthma without complication [J45.20]  Yes     Patient with difficult to control triggers which include reflux with esophogeal dysmotility and allergic rhinitis.  Unfortunately, these triggers are difficult to control and are likely etiology of chronic cough with only mild intermittent asthma.            Resolved Hospital Problems   No resolved problems to display.       Future Appointments   Date Time Provider Department Center   7/10/2023  1:30 PM Kassi Moura PA-C [facility] SPINE [facility]   7/13/2023  1:40 PM Love Whaley MD [facility]   7/24/2023  7:15 AM [facility] OIC-XRAY [facility]   7/24/2023  8:00 AM Krystal Ross PA-C [facility] SPINE [facility]   7/25/2023 10:00 AM LAB, [facility]   8/23/2023  1:30 PM Patrice Jurado, JEFFERSON [facility] OPTOMTY [facility]   9/5/2023  8:20 AM [facility] LAB BMT [facility] LABBMT [facility]   9/5/2023  9:20 AM Manjeet Woodson MD [facility] HC BMT [facility]   9/6/2023 10:00 AM Isiah Moran MD [facility] RHEUM [facility]   9/6/2023 10:30 AM Isiah Steele MD [facility] ORTHO [facility]   12/8/2023 11:00 AM [facility] [address] [place] [address] [facility]           I have seen and examined this patient face to face today. My clinical findings that support the need for the home health skilled services and home bound status are the following:  Weakness/numbness causing balance and gait disturbance due to Surgery making it taxing to leave home.  Requiring assistive device  "to leave home due to unsteady gait caused by Surgery.    Allergies:  Review of patient's allergies indicates:   Allergen Reactions    Actemra [tocilizumab] Other (See Comments)     Severe dizziness    Codeine Nausea And Vomiting and Hives    Gold au 198 Hives and Rash    Hydroxychloroquine Other (See Comments)     Can't remember the reaction      Iodinated contrast media Other (See Comments)     Other reaction(s): BURNING ALL OVER    Iodine Other (See Comments) and Hives     Other reaction(s): BURNING ALL OVER - Iodine dye - Not topical    Ondansetron Nausea And Vomiting    Sulfa (sulfonamide antibiotics) Other (See Comments)     Can't remember the reaction    Zofran [ondansetron hcl (pf)] Nausea And Vomiting     Pt reports that last time she received zofran she started vomiting again    Methotrexate analogues Nausea Only    Pneumococcal vaccine Other (See Comments)    Pneumovax 23 [pneumococcal 23-argenis ps vaccine] Other (See Comments)     "sick"    Methotrexate Nausea Only       Diet: regular diet    Activities: activity as tolerated    Home Health Admitting Clinician:   SN/PT to complete comprehensive assessment including routine vital signs. Instruct on disease process and s/s of complications to report to MD. Follow specific home health arthoplasty protocol. Review/verify medication list sent home with the patient at time of discharge  and instruct patient/caregiver as needed. If coumadin ordered, coumadin clinic to manage INR with INR draws 2x per week with a goal to maintain INR between 1.8 and 2.2. Frequency may be adjusted depending on start of care date.    Notify MD if SBP > 160 or < 90; DBP > 90 or < 50; HR > 120 or < 50; Temp > 101    Home Medical Equipment:  Walker, 3-1 bedside commode, transfer tub bench    CONSULTS:    Physical Therapy may admit if patient not on coumadin, PT to perform comprehensive assessment if performing admit visit and generate therapy plan of care. Evaluate for home safety and " equipment needs; Establish/upgrade home exercise program. Perform/instruct on therapeutic exercises, gait training, transfer training, and Range of Motion.      OTHER: (only select if patient needs other therapy disciplines)  Occupational Therapy to evaluate and treat. Evaluate home environment for safety and equipment needs. Perform/Instruct on transfers, ADL training, ROM, and therapeutic exercises.    MISCELLANEOUS CARE:  Routine Skin for Bedridden Patients: Instruct patient/caregiver to apply moisture barrier cream to all skin folds and wet areas in perineal area daily and after baths and all bowel movements.    WOUND CARE ORDERS:  Assess Surgical Incision/DSRG each TX  Aquacel AG drsg applied post-op leave on 14 days post op. Call MD if any drainage reaches border to border of drsg horizontally, s/s of infection, temp >101, induration, swelling or redness.  If dressing is removed per MD order, then apply island dressing, change/teach caregiver to perform daily dressing change if island dressing present.    Medications: Review discharge medications with patient and family and provide education.      Current Discharge Medication List        CONTINUE these medications which have NOT CHANGED    Details   budesonide-formoterol 160-4.5 mcg (SYMBICORT) 160-4.5 mcg/actuation HFAA Inhale 2 puffs into the lungs every 12 (twelve) hours.  Qty: 30.6 g, Refills: 3    Associated Diagnoses: Chronic asthma, mild intermittent, uncomplicated      cycloSPORINE (RESTASIS) 0.05 % ophthalmic emulsion PLACE 1 DROP INTO BOTH EYES TWICE A DAY  Qty: 180 each, Refills: 3    Associated Diagnoses: Bilateral dry eyes      HYDROmorphone (DILAUDID) 4 MG tablet Take 0.5 tablets (2 mg total) by mouth every 8 (eight) hours as needed for Pain.  Qty: 45 tablet, Refills: 0    Comments: Quantity prescribed more than 7 day supply? Yes, quantity medically necessary  Associated Diagnoses: Lumbar radiculopathy; Osteoarthritis of spine with radiculopathy,  lumbar region; Intractable back pain; Sacroiliac dysfunction; Pain of right hip      INV PROPULSID 10 MG Take 2 tablets (20 mg) by mouth 4 (four) times daily. FOR INVESTIGATIONAL USE ONLY. Protocol [id number]  Subject ID: [id number].  Qty: 1440 each, Refills: 0    Associated Diagnoses: Patient in clinical research study; Gastroparesis      leflunomide (ARAVA) 20 MG Tab TAKE 1 TABLET BY MOUTH EVERY DAY  Qty: 90 tablet, Refills: 0    Associated Diagnoses: Rheumatoid arthritis      lifitegrast (XIIDRA) 5 % Dpet INSTILL ONE DROP INTO BOTH EYES TWICE A DAY  Qty: 60 mL, Refills: 10      methenamine (HIPREX) 1 gram Tab Take 1 tablet (1 g total) by mouth 2 (two) times daily.  Qty: 180 tablet, Refills: 3    Associated Diagnoses: History of recurrent UTIs      !! methocarbamoL (ROBAXIN) 750 MG Tab Take 1 tablet (750 mg total) by mouth 4 (four) times daily.  Qty: 120 tablet, Refills: 0    Associated Diagnoses: Lumbar radiculopathy      mirabegron (MYRBETRIQ) 25 mg Tb24 ER tablet Take 1 tablet (25 mg total) by mouth once daily.  Qty: 90 tablet, Refills: 3    Associated Diagnoses: Urinary urgency; Urge urinary incontinence      montelukast (SINGULAIR) 10 mg tablet TAKE 1 TABLET BY MOUTH EVERY DAY AT NIGHT  Qty: 90 tablet, Refills: 3    Associated Diagnoses: Perennial allergic rhinitis      omeprazole (PRILOSEC) 40 MG capsule Take 1 capsule (40 mg total) by mouth every morning.  Qty: 30 capsule, Refills: 6      !! predniSONE (DELTASONE) 5 MG tablet Take 1 tablet (5 mg total) by mouth once daily.  Qty: 90 tablet, Refills: 1      pregabalin (LYRICA) 100 MG capsule Take 1 capsule (100 mg total) by mouth 3 (three) times daily.  Qty: 90 capsule, Refills: 2    Associated Diagnoses: Fibromyalgia      rosuvastatin (CRESTOR) 20 MG tablet Take 1 tablet (20 mg total) by mouth every evening.  Qty: 90 tablet, Refills: 3    Associated Diagnoses: Coronary artery disease involving native coronary artery of native heart without angina pectoris       !! sucralfate (CARAFATE) 1 gram tablet TAKE 1 TABLET (1 G TOTAL) BY MOUTH 4 (FOUR) TIMES DAILY.  Qty: 360 tablet, Refills: 2    Associated Diagnoses: Gastroparesis      albuterol (PROVENTIL/VENTOLIN HFA) 90 mcg/actuation inhaler Inhale 2 puffs into the lungs every 6 (six) hours as needed. Rescue  Qty: 20.1 g, Refills: 11      albuterol-ipratropium (DUO-NEB) 2.5 mg-0.5 mg/3 mL nebulizer solution Take 3 mLs by nebulization every 6 (six) hours as needed for Wheezing. Rescue  Qty: 90 mL, Refills: 3    Associated Diagnoses: Moderate persistent asthma with acute exacerbation; Bronchitis      aspirin 325 MG tablet Take 1 tablet (325 mg total) by mouth once daily.  Qty: 30 tablet, Refills: 0    Comments: Bedside delivery to [facility] for discharge on 1/12/23      calcium citrate-vitamin D3 315-200 mg (CITRACAL+D) 315 mg-5 mcg (200 unit) per tablet Take 1 tablet by mouth 2 (two) times daily.       diclofenac sodium (VOLTAREN) 1 % Gel APPLY 2 GRAMS TOPICALLY 4 (FOUR) TIMES DAILY AS NEEDED.  Qty: 300 g, Refills: 2    Associated Diagnoses: Osteoarthritis, unspecified osteoarthritis type, unspecified site      fluconazole (DIFLUCAN) 150 MG Tab Take 150 mg by mouth as needed.      furosemide (LASIX) 20 MG tablet Take 1 tablet (20 mg total) by mouth once daily.  Qty: 5 tablet, Refills: 0    Associated Diagnoses: Leg edema      halobetasol propion-tazarotene (DUOBRII) 0.01-0.045 % Lotn aaa on feet and hands qhs  then vaseline and warm towel  Qty: 100 g, Refills: 3    Associated Diagnoses: Psoriasiform dermatitis      ketoconazole (NIZORAL) 2 % cream Apply to feet twice daily for 3 weeks  Qty: 60 g, Refills: 3    Associated Diagnoses: Tinea pedis of both feet      magic mouthwash diphen/antac/lidoc Swish and Spit 5 mL by mouth for 30 seconds twice daily.  Qty: 150 mL, Refills: 0      !! methocarbamoL (ROBAXIN) 750 MG Tab Take 1 tablet (750 mg total) by mouth 3 (three) times daily.  Qty: 90 tablet, Refills: 0      naproxen (NAPROSYN)  500 MG tablet Take 1 tablet (500 mg total) by mouth 2 (two) times daily as needed (prn).  Qty: 180 tablet, Refills: 1      omeprazole-sodium bicarbonate (ZEGERID) 40-1.1 mg-gram per capsule TAKE 1 CAPSULE BY MOUTH EVERY DAY IN THE MORNING  Qty: 90 capsule, Refills: 3      potassium chloride SA (K-DUR,KLOR-CON) 20 MEQ tablet Take 1 tablet (20 mEq total) by mouth once daily. Take with lasix  Qty: 5 tablet, Refills: 0    Associated Diagnoses: Leg edema      POTASSIUM ORAL Take by mouth once daily.      !! predniSONE (DELTASONE) 1 MG tablet TAKE 5 TABLETS (5 MG TOTAL) BY MOUTH ONCE DAILY.  Qty: 450 tablet, Refills: 1      PREMARIN vaginal cream PLACE 0.5 GRAM VAGINALLY 3 (THREE) TIMES A WEEK.  Qty: 30 g, Refills: 1      promethazine (PHENERGAN) 25 MG tablet Take 1 tablet (25 mg total) by mouth every 6 (six) hours as needed for Nausea.  Qty: 15 tablet, Refills: 0    Comments: Bedside delivery [facility] 1/11/23 with Dr. Sivan miller (KEVZARA) 200 mg/1.14 mL Syrg Inject 1.14 mL (200 mg total) into the skin every 14 (fourteen) days.  Qty: 2.28 mL, Refills: 1    Associated Diagnoses: Rheumatoid arthritis, involving unspecified site, unspecified whether rheumatoid factor present      !! sucralfate (CARAFATE) 1 gram tablet Take 1 tablet (1 g total) by mouth 4 (four) times daily.  Qty: 360 tablet, Refills: 3    Associated Diagnoses: Gastroparesis       !! - Potential duplicate medications found. Please discuss with provider.          I certify that this patient is confined to her home and needs intermittent skilled nursing care, physical therapy, and occupational therapy.

## 2023-06-26 NOTE — PROGRESS NOTES
DATE: 6/7/23  PATIENT: Joyce Peterson        CHIEF COMPLAINT: low back and bilateral leg pain; pre-op visit     HISTORY:  Joyce Peterson is a 62 y.o. female with a pmhx of sjogrens and RA with chronic steroids (on prolia, hx of multiple fx), steroid induced osteoporosis,  here for pre-op visit. She has low back and bilateral leg pain (Back - 7, Leg - 7).  The pain in the lower back and behind both legs is what bothers her most.  The pain has been present for years, worsening over time. The patient describes the pain as aching and radiating down the back of both legs.  The pain is worse with walking and laying and improved by leaning on the shopping cart. There is positive associated numbness and tingling. There is negative subjective weakness. Prior treatments have included healthy back program, medications, and multiple pain management procedures, but no surgery. The pt has been largely using her wheelchair when she leaves the house due to pain. She takes dilaudid PO bid, robaxin and lyrica.      The patient denies myelopathic symptoms such as handwriting changes or difficulty with buttons/coins/keys. Denies perineal paresthesias, bowel/bladder dysfunction.     Pain Procedures:   3/12/20 Bilateral L4/5 and L5/S1 facet injection- significant benefit of back pain  7/30/20 Right hip injection- no relief   8/31/20 Right L3/4 and L4/5 TF MADELINE- 60% relief of leg pain  3/29/21 Bilateral L3/4, L4/5 and L5/S1 facet injections  7/21/21 Bilateral L4/5 TF MADELINE- limited benefit  8/23/21 Bilateral L3,4,5 MBB- significant short term benefit  8/26/21 Bilateral L3,4,5 MBB- significant short term benefit  9/20/21 Left L3,4,5 RFA- 80% relief  10/4/21 Right L3,4,5 RFA- 80% relief  4/12/22 Left L3,4,5 RFA- 80% relief  5/12/22 Right L3,4,5 RFA- 80% relief  7/25/22 Left Femoral and Left obturator Coolief RFA  9/29/22 L3-4 MADELINE  12/12/22 L5-S1 MADELINE  4/6/23 L5-S1 MADELINE     PAST MEDICAL/SURGICAL HISTORY:       Past Medical History:    Diagnosis Date    Acid reflux      Allergy      Anemia      Asthma      Coronary artery disease      CS (cervical spondylosis) 03/08/2013    Degenerative disc disease      Degenerative joint disease of hindfoot, left 6/28/2022    Dry eyes      Dry mouth      Gastroparesis      History of methotrexate therapy 01/19/2022    Hyperlipidemia      Lateral meniscus derangement 04/06/2016    Lobular carcinoma in situ      Lumbar spondylosis 03/08/2013    Osteoarthritis      Osteoporosis      Pes planovalgus, acquired, left 6/28/2022    Rheumatoid arthritis(714.0)      Rupture of left triceps tendon 10/17/2018    Umbilical hernia 08/13/2015            Past Surgical History:   Procedure Laterality Date    BREAST BIOPSY Left 01/29/2002     core bx    CARPAL TUNNEL RELEASE Right 05/2017    CHOLECYSTECTOMY   2004    COLONOSCOPY         10/11    COLONOSCOPY N/A 6/29/2017     Procedure: COLONOSCOPY;  Surgeon: López Moore MD;  Location: [facility] ENDO ([address] [place] [address]);  Service: Endoscopy;  Laterality: N/A;    EPIDURAL STEROID INJECTION N/A 11/18/2021     Procedure: INJECTION, STEROID, EPIDURAL, L5-S1 IL NEED CONSENT;  Surgeon: Tj Mayfield MD;  Location: [facility] PAIN MGT;  Service: Pain Management;  Laterality: N/A;    EPIDURAL STEROID INJECTION N/A 9/29/2022     Procedure: LUMBAR L3/L4 IL MADELINE CONTRAST;  Surgeon: jT Mayfield MD;  Location: [facility] PAIN MGT;  Service: Pain Management;  Laterality: N/A;    EPIDURAL STEROID INJECTION N/A 12/12/2022     Procedure: INJECTION, STEROID, EPIDURAL L5/S1 IL CONTRAST;  Surgeon: Tj Mayfield MD;  Location: [facility] PAIN MGT;  Service: Pain Management;  Laterality: N/A;    EPIDURAL STEROID INJECTION N/A 4/6/2023     Procedure: INJECTION, STEROID, EPIDURAL L5/S1;  Surgeon: Tj Mayfield MD;  Location: [facility] PAIN MGT;  Service: Pain Management;  Laterality: N/A;    FOOT ARTHRODESIS Left 1/11/2023     Procedure: FUSION, FOOT;  Surgeon: Ariella Finnegan MD;  Location: [facility] OR;  Service: Orthopedics;  Laterality:  Left;  [facility]    HARVESTING OF BONE GRAFT Left 1/11/2023     Procedure: SURGICAL PROCUREMENT, BONE GRAFT;  Surgeon: Ariella Finnegan MD;  Location: [facility];  Service: Orthopedics;  Laterality: Left;    INJECTION OF ANESTHETIC AGENT AROUND NERVE Bilateral 8/23/2021     Procedure: BLOCK, NERVE, MEDIAL BRANCH L3,L4,L5;  Surgeon: Tj Mayfield MD;  Location: [facility] PAIN MGT;  Service: Pain Management;  Laterality: Bilateral;  1 of 2    INJECTION OF ANESTHETIC AGENT AROUND NERVE Bilateral 8/26/2021     Procedure: BLOCK, NERVE, MEDIAL BRANCH L3,L4,L5;  Surgeon: Tj Mayfield MD;  Location: [facility] PAIN MGT;  Service: Pain Management;  Laterality: Bilateral;  2 of 2    INJECTION OF ANESTHETIC AGENT AROUND NERVE Left 7/7/2022     Procedure: BLOCK, NERVE, LEFT OBTURATOR AND FEMORAL;  Surgeon: Tj Mayfield MD;  Location: [facility] PAIN MGT;  Service: Pain Management;  Laterality: Left;    INJECTION OF FACET JOINT Bilateral 3/12/2020     Procedure: FACET JOINT INJECTION (LUMBAR BLOCK) BILATERAL L4-5 AND L5-S1 DIRECT REFERRAL;  Surgeon: Tj Mayfield MD;  Location: [facility] PAIN MGT;  Service: Pain Management;  Laterality: Bilateral;  NEEDS CONSENT    INJECTION OF FACET JOINT Bilateral 3/29/2021     Procedure: INJECTION, FACET JOINT L3/4, L4/5, L5/S1;  Surgeon: Tj Mayfield MD;  Location: [facility] PAIN MGT;  Service: Pain Management;  Laterality: Bilateral;    INJECTION OF JOINT Right 7/30/2020     Procedure: INJECTION, JOINT, RIGHT HIP Interarticular under flouro;  Surgeon: Tj Mayfield MD;  Location: [facility] PAIN MGT;  Service: Pain Management;  Laterality: Right;  INJECTION, JOINT, RIGHT HIP Interarticular under flouro    INJECTION OF JOINT Right 2/21/2022     Procedure: Injection, Joint RIGHT ISCHIAL BURSA;  Surgeon: Tj Mayfield MD;  Location: [facility] PAIN MGT;  Service: Pain Management;  Laterality: Right;    INTRAMEDULLARY RODDING OF FEMUR Left 5/21/2019     Procedure: INSERTION, INTRAMEDULLARY ARIEL, FEMUR;  Surgeon: López Duff MD;  Location:  [facility] OR 2ND FLR;  Service: Orthopedics;  Laterality: Left;    LENGTHENING OF TENDON OF LOWER EXTREMITY Left 1/11/2023     Procedure: LENGTHENING, TENDON, LOWER EXTREMITY;  Surgeon: Ariella Finnegan MD;  Location: [facility] OR;  Service: Orthopedics;  Laterality: Left;    MAGNETIC RESONANCE IMAGING N/A 5/29/2023     Procedure: MRI (Magnetic Resonance Imagine);  Surgeon: Sujey Surgeon;  Location: [facility];  Service: Anesthesiology;  Laterality: N/A;    RADIOFREQUENCY ABLATION Left 9/20/2021     Procedure: RADIOFREQUENCY ABLATION left L3,4,5 RFA  1 of 2  CONSENT NEEDED;  Surgeon: Tj Mayfield MD;  Location: [facility] PAIN MGT;  Service: Pain Management;  Laterality: Left;    RADIOFREQUENCY ABLATION Right 10/4/2021     Procedure: RADIOFREQUENCY ABLATION  right L3,4,5 RFA   2 of 2  CONSENT NEEDED;  Surgeon: Tj Mayfield MD;  Location: [facility] PAIN MGT;  Service: Pain Management;  Laterality: Right;    RADIOFREQUENCY ABLATION Left 4/21/2022     Procedure: Radiofrequency Ablation LEFT L3,L4,L5;  Surgeon: Tj Mayfield MD;  Location: [facility] PAIN MGT;  Service: Pain Management;  Laterality: Left;    RADIOFREQUENCY ABLATION Right 5/12/2022     Procedure: Radiofrequency Ablation RIGHT L3,L4,L5;  Surgeon: Tj Mayfield MD;  Location: [facility] PAIN MGT;  Service: Pain Management;  Laterality: Right;    RADIOFREQUENCY ABLATION Left 7/25/2022     Procedure: Radiofrequency Ablation Left Femoral & Obturator;  Surgeon: Tj Mayfield MD;  Location: [facility] PAIN MGT;  Service: Pain Management;  Laterality: Left;    REPAIR OF TRICEPS TENDON Left 10/17/2018     Procedure: REPAIR, TENDON, TRICEPS left elbow;  Surgeon: Staci Yarbrough MD;  Location: [facility] 1ST FLR;  Service: Orthopedics;  Laterality: Left;  Anesthesia: General and regional. PRONE, k-wire , hand pan 1 and pan 2, CALL ARTHREX/Tamera notified 10-12 LO    TONSILLECTOMY        TRANSFORAMINAL EPIDURAL INJECTION OF STEROID Right 8/31/2020     Procedure: INJECTION, STEROID, EPIDURAL,  TRANSFORAMINAL,  APPROACH, L3-L4 and L4-L5 need consent;  Surgeon: Tj Mayfield MD;  Location: [facility] PAIN MGT;  Service: Pain Management;  Laterality: Right;    TRANSFORAMINAL EPIDURAL INJECTION OF STEROID Bilateral 7/23/2021     Procedure: INJECTION, STEROID, EPIDURAL, TRANSFORAMINAL APPROACH L4/5;  Surgeon: Tj Mayfield MD;  Location: [facility] PAIN [facility]GT;  Service: Pain Management;  Laterality: Bilateral;    TRIGGER POINT INJECTION N/A 7/23/2021     Procedure: INJECTION, TRIGGER POINT SCAPULAR;  Surgeon: Tj Mayfield MD;  Location: [facility] PAIN MGT;  Service: Pain Management;  Laterality: N/A;    TUBAL LIGATION   2003    UPPER GASTROINTESTINAL ENDOSCOPY         10/11    uterine ablation   2003         Medications:   No current facility-administered medications on file prior to encounter.             Current Outpatient Medications on File Prior to Encounter   Medication Sig Dispense Refill    albuterol (PROVENTIL/VENTOLIN HFA) 90 mcg/actuation inhaler Inhale 2 puffs into the lungs every 6 (six) hours as needed. Rescue 20.1 g 11    albuterol-ipratropium (DUO-NEB) 2.5 mg-0.5 mg/3 mL nebulizer solution Take 3 mLs by nebulization every 6 (six) hours as needed for Wheezing. Rescue 90 mL 3    aspirin 325 MG tablet Take 1 tablet (325 mg total) by mouth once daily. (Patient not taking: Reported on 5/26/2023) 30 tablet 0    budesonide-formoterol 160-4.5 mcg (SYMBICORT) 160-4.5 mcg/actuation HFAA Inhale 2 puffs into the lungs every 12 (twelve) hours. 30.6 g 3    calcium citrate-vitamin D3 315-200 mg (CITRACAL+D) 315 mg-5 mcg (200 unit) per tablet Take 1 tablet by mouth 2 (two) times daily.         diclofenac sodium (VOLTAREN) 1 % Gel APPLY 2 GRAMS TOPICALLY 4 (FOUR) TIMES DAILY AS NEEDED. 300 g 2    fluconazole (DIFLUCAN) 150 MG Tab Take 150 mg by mouth as needed.        furosemide (LASIX) 20 MG tablet Take 1 tablet (20 mg total) by mouth once daily. (Patient not taking: Reported on 6/13/2023) 5 tablet 0    halobetasol  propion-tazarotene (DUOBRII) 0.01-0.045 % Lotn aaa on feet and hands qhs  then vaseline and warm towel (Patient not taking: Reported on 6/13/2023) 100 g 3    INV PROPULSID 10 MG Take 2 tablets (20 mg) by mouth 4 (four) times daily. FOR INVESTIGATIONAL USE ONLY. Protocol CIS-[id number]  Subject ID: [id number]. 1440 each 0    ketoconazole (NIZORAL) 2 % cream Apply to feet twice daily for 3 weeks (Patient not taking: Reported on 6/13/2023) 60 g 3    lifitegrast (XIIDRA) 5 % Dpet INSTILL ONE DROP INTO BOTH EYES TWICE A DAY 60 mL 10    magic mouthwash diphen/antac/lidoc Swish and Spit 5 mL by mouth for 30 seconds twice daily. (Patient not taking: Reported on 6/13/2023) 150 mL 0    methocarbamoL (ROBAXIN) 750 MG Tab Take 1 tablet (750 mg total) by mouth 3 (three) times daily. 90 tablet 0    naproxen (NAPROSYN) 500 MG tablet Take 1 tablet (500 mg total) by mouth 2 (two) times daily as needed (prn). 180 tablet 1    omeprazole (PRILOSEC) 40 MG capsule Take 1 capsule (40 mg total) by mouth every morning. 30 capsule 6    potassium chloride SA (K-DUR,KLOR-CON) 20 MEQ tablet Take 1 tablet (20 mEq total) by mouth once daily. Take with lasix 5 tablet 0    POTASSIUM ORAL Take by mouth once daily.        predniSONE (DELTASONE) 1 MG tablet TAKE 5 TABLETS (5 MG TOTAL) BY MOUTH ONCE DAILY. (Patient taking differently: Take 5 mg by mouth once daily. Pt takes a total of 6 mg every morning.) 450 tablet 1    predniSONE (DELTASONE) 5 MG tablet Take 1 tablet (5 mg total) by mouth once daily. 90 tablet 1    pregabalin (LYRICA) 100 MG capsule Take 1 capsule (100 mg total) by mouth 3 (three) times daily. 90 capsule 2    PREMARIN vaginal cream PLACE 0.5 GRAM VAGINALLY 3 (THREE) TIMES A WEEK. 30 g 1    promethazine (PHENERGAN) 25 MG tablet Take 1 tablet (25 mg total) by mouth every 6 (six) hours as needed for Nausea. 15 tablet 0    rosuvastatin (CRESTOR) 20 MG tablet Take 1 tablet (20 mg total) by mouth every evening. 90 tablet 3    sucralfate  (CARAFATE) 1 gram tablet TAKE 1 TABLET (1 G TOTAL) BY MOUTH 4 (FOUR) TIMES DAILY. 360 tablet 2    sucralfate (CARAFATE) 1 gram tablet Take 1 tablet (1 g total) by mouth 4 (four) times daily. 360 tablet 3         Social History:   Social History               Socioeconomic History    Marital status:    Tobacco Use    Smoking status: Never    Smokeless tobacco: Never   Substance and Sexual Activity    Alcohol use: Yes       Comment: 2-3 times per year.    Drug use: No    Sexual activity: Yes       Partners: Male       Birth control/protection: Surgical      Social Determinants of Health          Financial Resource Strain: Unknown    Difficulty of Paying Living Expenses: Patient refused   Food Insecurity: Unknown    Worried About Running Out of Food in the Last Year: Patient refused    Ran Out of Food in the Last Year: Patient refused   Transportation Needs: Unknown    Lack of Transportation (Medical): Patient refused    Lack of Transportation (Non-Medical): Patient refused   Physical Activity: Unknown    Days of Exercise per Week: Patient refused   Stress: No Stress Concern Present    Feeling of Stress : Not at all   Social Connections: Unknown    Frequency of Communication with Friends and Family: More than three times a week    Frequency of Social Gatherings with Friends and Family: Twice a week    Active Member of Clubs or Organizations: Patient refused    Attends Club or Organization Meetings: More than 4 times per year    Marital Status:    Housing Stability: Unknown    Unable to Pay for Housing in the Last Year: Patient refused    Unstable Housing in the Last Year: Patient refused            REVIEW OF SYSTEMS:  Constitution: Negative. Negative for chills, fever and night sweats.   Cardiovascular: Negative for chest pain and syncope.   Respiratory: Negative for cough and shortness of breath.   Gastrointestinal: See HPI. Negative for nausea/vomiting. Negative for abdominal pain.  Genitourinary: See  HPI. Negative for discoloration or dysuria.  Skin: Negative for dry skin, itching and rash.   Hematologic/Lymphatic: Negative for bleeding problem. Does not bruise/bleed easily.   Musculoskeletal: Negative for falls and muscle weakness.   Neurological: See HPI. No seizures.   Endocrine: Negative for polydipsia, polyphagia and polyuria.   Allergic/Immunologic: Negative for hives and persistent infections.     EXAM:  LMP  (LMP Unknown)   Breastfeeding No      General: The patient is a very pleasant 62 y.o. female in no apparent distress, the patient is oriented to person, place and time.  Psych: Normal mood and affect  HEENT: Vision grossly intact, hearing intact to the spoken word.  Lungs: Respirations unlabored.  Gait: Antalgic station and gait, no difficulty with toe or heel walk.   Skin: Dorsal lumbar skin negative for rashes, lesions, hairy patches and surgical scars. There is mild lumbar tenderness to palpation.  Range of motion: Lumbar range of motion is acceptable.  Spinal Balance: Global saggital and coronal spinal balance acceptable, not significant for scoliosis and kyphosis.  Musculoskeletal: No pain with the range of motion of the bilateral hips. No trochanteric tenderness to palpation.  Vascular: Bilateral lower extremities warm and well perfused, dorsalis pedis pulses 2+ bilaterally.  Neurological: Normal strength and tone in all major motor groups in the bilateral lower extremities. Normal sensation to light touch in the L2-S1 dermatomes bilaterally.  Deep tendon reflexes symmetric 2+ in the bilateral lower extremities.  Negative Babinski bilaterally. Straight leg raise negative bilaterally.     IMAGING:      Today I personally reviewed AP, Lat and Flex/Ex  upright L-spine films that demonstrate severe degenerative changes at L4-5 L5-S1 with grade I anterolisthesis of L4 on L5.      MRI lumbar spine demonstrates multilevel degenerative changes of the lumbar spine, most severe at the level of L4-L5  where there is anterolisthesis and a disc extrusion contributing to severe spinal canal stenosis and moderate left and mild right neural foraminal narrowing.     There is no height or weight on file to calculate BMI.            Hemoglobin A1C   Date Value Ref Range Status   03/15/2022 5.2 4.0 - 5.6 % Final       Comment:       ADA Screening Guidelines:  5.7-6.4%  Consistent with prediabetes  >or=6.5%  Consistent with diabetes     High levels of fetal hemoglobin interfere with the HbA1C  assay. Heterozygous hemoglobin variants (HbS, HgC, etc)do  not significantly interfere with this assay.   However, presence of multiple variants may affect accuracy.      02/06/2017 6.0 4.5 - 6.2 % Final       Comment:       According to ADA guidelines, hemoglobin A1C <7.0% represents  optimal control in non-pregnant diabetic patients.  Different  metrics may apply to specific populations.   Standards of Medical Care in Diabetes - 2016.  For the purpose of screening for the presence of diabetes:  <5.7%     Consistent with the absence of diabetes  5.7-6.4%  Consistent with increasing risk for diabetes   (prediabetes)  >or=6.5%  Consistent with diabetes  Currently no consensus exists for use of hemoglobin A1C  for diagnosis of diabetes for children.      09/28/2015 5.6 4.5 - 6.2 % Final               ASSESSMENT/PLAN:  Joyce Peterson is a 62 y.o. female with symptomatic lumbar spinal stenosis with neurogenic claudications and L4/L5 degenerative spondylolisthesis. She has failed conservative management.     Plan for L4/5 TLIF    I had a sit down discussion with the patient regarding the planned procedure. We specifically discussed the risks, benefits, and alternatives to surgery. We discussed the surgical procedure including the skin incision, nerve decompression, bone fusion, allograft, iliac crest bone graft, and surgical implants including pedicle screws and interbody devices as indicated: they understand the risks include but are  not limited to death, paralysis, blindness, bleeding, infection, damage to arteries, veins and nerves, spinal fluid leak, continued or worsening pain, no improvement in symptoms, non-union, and the possible need for more surgery in the future, the possible need for perioperative blood transfusion, as well as the possibility other unforseen and unknown complications. We talked about expected hospital stay and recovery period. All questions were answered; they understand and wish to proceed.

## 2023-06-26 NOTE — PROGRESS NOTES
"Attempted to perform in and out catheterization as ordered but patient refused procedure noting that she "was not doing a thing until after she received next pain medication dosage after 8am. Patient explained importance of obtaining specimen before IV antibiotics are initiated. Will notify oncoming nurse of patient's request.  "

## 2023-06-26 NOTE — PT/OT/SLP PROGRESS
Occupational Therapy   Co-Treatment    Name: Joyce Peterson  MRN: [id number]  Admitting Diagnosis:  Lumbar stenosis  4 Days Post-Op    Recommendations:     Discharge Recommendations: other (see comments)  Discharge Equipment Recommendations:  walker, rolling  Barriers to discharge:  None    Assessment:     Joyce Peterson is a 62 y.o. female with a medical diagnosis of Lumbar stenosis.  She presents with the willingness to participate in today's session. Pt.completed toilet task in bathroom and was educated the benefits and use of DME.  Performance deficits affecting function are weakness, impaired endurance, decreased lower extremity function, pain, orthopedic precautions.     Rehab Prognosis:  Good; patient would benefit from acute skilled OT services to address these deficits and reach maximum level of function.       Plan:     Patient to be seen 3 x/week to address the above listed problems via self-care/home management, therapeutic activities, therapeutic exercises, neuromuscular re-education  Plan of Care Expires: 07/23/23  Plan of Care Reviewed with: patient, spouse    Subjective     Chief Complaint: c/o pain when coughing  Patient/Family Comments/goals:  Pt.reports she wants to go home tomorrow  Pain/Comfort:  Pain Rating 1:  (unrated)  Location - Orientation 1: generalized  Location 1: back  Pain Addressed 1: Reposition, Distraction, Nurse notified  Pain Rating Post-Intervention 1:  (unrated\)    Objective:     Communicated with: RN prior to session.  Patient found up in chair with  (no active lines) upon OT entry to room.    General Precautions: Standard, fall    Orthopedic Precautions:spinal precautions  Braces: N/A  Respiratory Status: Room air     Occupational Performance:     Functional Mobility/Transfers:  Patient completed Sit <> Stand Transfer with supervision  with  rolling walker   Patient completed Toilet Transfer Step Transfer technique with supervision with  rolling walker  Functional  Mobility: Pt demonstrated functional mobility training to simulate household to/ from bathroom with RW with contact guard assistance and no LOB and good visual search and navigation strategies.     Activities of Daily Living:  Toileting: supervision seated for pericare at toilet in bathroom     [facility] 6 Click ADL: 20    Treatment & Education:  Pt. Educated on pain management and energy conservation techniques upon discharge to return home to maintain roles  Pt educated and reviewed on Spinal precautions (No BTL) and log roll technique  Pt. Educated on the benefits of DME and how to proper use the DME (BCS and RW)  Pt.educated on the bracing a pillow to decrease abdominal pressure when coughing    Pt educated on role of occupational therapy, POC, and safety during ADLs and functional mobility. Pt and OT discussed importance of safe, continued mobility to optimize daily living skills. Pt verbalized understanding. Pt given instruction to call for medical staff/nurse for assistance.     Patient left up in chair with all lines intact, call button in reach, RN notified, and Wife present    GOALS:   Multidisciplinary Problems       Occupational Therapy Goals          Problem: Occupational Therapy    Goal Priority Disciplines Outcome Interventions   Occupational Therapy Goal     OT, PT/OT Ongoing, Progressing    Description: Goals to be met by: 7/14/23     Patient will increase functional independence with ADLs by performing:    UE Dressing with Modified Teller.  LE Dressing with Modified Teller.  Grooming while standing with Modified Teller.  Toileting from toilet/bedside commode with Modified Teller for hygiene and clothing management.   Toilet transfer to toilet/bedside commode with Modified Teller and LRAD as needed.                         Time Tracking:     OT Date of Treatment: 06/26/23  OT Start Time: 1431  OT Stop Time: 1454  OT Total Time (min): 23 min    Billable Minutes:Self  Care/Home Management 10  Therapeutic Activity 13    OT/OLIVIA: OT          6/26/2023

## 2023-06-26 NOTE — PROGRESS NOTES
"[facility]  Orthopedics  Progress Note    Patient Name: Joyce Peterson  MRN: [id number]  Admission Date: 6/22/2023  Hospital Length of Stay: 4 days  Attending Provider: Jh Mosqueda MD  Primary Care Provider: Krystal Singleton,   Follow-up For: Procedure(s) (LRB):  FUSION, SPINE, LUMBAR, TLIF, POSTERIOR APPROACH, USING PEDICLE SCREW L4/5 spinewave SNS:SSEP/EMG (N/A)    Post-Operative Day: 4 Days Post-Op  Subjective:     Principal Problem:Lumbar stenosis    Principal Orthopedic Problem: s/p L4/L5 TLIF on 6/22    Interval History: NAEON. VSS. Low grade temp to 100.1 this AM. Reporting back pain and area of erythema to right pre tibial region, no fluctuance concerning for abscess at this time. Rocephin started. UA pending.     Review of patient's allergies indicates:   Allergen Reactions    Actemra [tocilizumab] Other (See Comments)     Severe dizziness    Codeine Nausea And Vomiting and Hives    Gold au 198 Hives and Rash    Hydroxychloroquine Other (See Comments)     Can't remember the reaction      Iodinated contrast media Other (See Comments)     Other reaction(s): BURNING ALL OVER    Iodine Other (See Comments) and Hives     Other reaction(s): BURNING ALL OVER - Iodine dye - Not topical    Ondansetron Nausea And Vomiting    Sulfa (sulfonamide antibiotics) Other (See Comments)     Can't remember the reaction    Zofran [ondansetron hcl (pf)] Nausea And Vomiting     Pt reports that last time she received zofran she started vomiting again    Methotrexate analogues Nausea Only    Pneumococcal vaccine Other (See Comments)    Pneumovax 23 [pneumococcal 23-argenis ps vaccine] Other (See Comments)     "sick"    Methotrexate Nausea Only       Current Facility-Administered Medications   Medication    acetaminophen tablet 650 mg    albuterol-ipratropium 2.5 mg-0.5 mg/3 mL nebulizer solution 3 mL    atorvastatin tablet 80 mg    cefTRIAXone (ROCEPHIN) 2 g in dextrose 5 % in water (D5W) 5 % 100 mL " IVPB (MB+)    celecoxib capsule 200 mg    famotidine tablet 20 mg    fluticasone furoate-vilanteroL 100-25 mcg/dose diskus inhaler 1 puff    furosemide tablet 20 mg    heparin (porcine) injection 5,000 Units    HYDROmorphone tablet 2 mg    And    HYDROmorphone tablet 4 mg    leflunomide tablet 20 mg    methocarbamoL tablet 750 mg    mupirocin 2 % ointment    oxybutynin 24 hr tablet 5 mg    oxyCODONE immediate release tablet 5 mg    oxyCODONE immediate release tablet Tab 10 mg    polyethylene glycol packet 17 g    pregabalin capsule 100 mg    promethazine tablet 25 mg    senna-docusate 8.6-50 mg per tablet 1 tablet    sodium chloride 0.9% flush 5 mL     Objective:     Vital Signs (Most Recent):  Temp: 100.1 °F (37.8 °C) (06/26/23 0841)  Pulse: 89 (06/26/23 0841)  Resp: 18 (06/26/23 0845)  BP: (!) 117/58 (06/26/23 0841)  SpO2: 95 % (06/26/23 0841) Vital Signs (24h Range):  Temp:  [99.2 °F (37.3 °C)-100.1 °F (37.8 °C)] 100.1 °F (37.8 °C)  Pulse:  [] 89  Resp:  [15-18] 18  SpO2:  [95 %-97 %] 95 %  BP: (107-124)/(56-61) 117/58           There is no height or weight on file to calculate BMI.      Intake/Output Summary (Last 24 hours) at 6/26/2023 0901  Last data filed at 6/25/2023 1730  Gross per 24 hour   Intake 460 ml   Output --   Net 460 ml         Ortho/SPM Exam   Awake/alert/oriented x3, No acute distress, Afebrile, Vital signs stable  Good inspiratory effort with unlaboured breathing  Dressings c/d/I  2cm x 2cm area of erythema to right pre tibial area; no fluctuance; ttp   NVI BUE/BLE    Significant Labs: All pertinent labs within the past 24 hours have been reviewed.    Significant Imaging: I have reviewed all pertinent imaging results/findings.    Assessment/Plan:     * Lumbar stenosis  Joyce Peterson is a 62 y.o. female s/p L4/L5 TLIF on 6/22    -UA pending  -Rocephin started for possible R pre tibial cellulitis; will transition to PO and dc home tomorrow if improving.     Pain  control: MM  PT/OT: WBAT BLE  DVT PPx: heparin 5k tid, SCDs at all times when not ambulating  Abx: completed  Labs: stable  Drain: dced  Cervantes: dced    Dispo: home with  when medically stable           Guzman Mohan MD  Orthopedics  [facility] - Surgery

## 2023-06-27 PROBLEM — L02.415: Status: ACTIVE | Noted: 2023-06-27

## 2023-06-27 LAB
GRAM STN SPEC: NORMAL
GRAM STN SPEC: NORMAL

## 2023-06-27 PROCEDURE — 87205 SMEAR GRAM STAIN: CPT | Performed by: STUDENT IN AN ORGANIZED HEALTH CARE EDUCATION/TRAINING PROGRAM

## 2023-06-27 PROCEDURE — 87075 CULTR BACTERIA EXCEPT BLOOD: CPT | Performed by: STUDENT IN AN ORGANIZED HEALTH CARE EDUCATION/TRAINING PROGRAM

## 2023-06-27 PROCEDURE — 25000003 PHARM REV CODE 250: Performed by: STUDENT IN AN ORGANIZED HEALTH CARE EDUCATION/TRAINING PROGRAM

## 2023-06-27 PROCEDURE — 25000003 PHARM REV CODE 250: Performed by: PHYSICIAN ASSISTANT

## 2023-06-27 PROCEDURE — 63600175 PHARM REV CODE 636 W HCPCS: Performed by: STUDENT IN AN ORGANIZED HEALTH CARE EDUCATION/TRAINING PROGRAM

## 2023-06-27 PROCEDURE — 87186 SC STD MICRODIL/AGAR DIL: CPT | Performed by: STUDENT IN AN ORGANIZED HEALTH CARE EDUCATION/TRAINING PROGRAM

## 2023-06-27 PROCEDURE — 11000001 HC ACUTE MED/SURG PRIVATE ROOM

## 2023-06-27 PROCEDURE — 63600175 PHARM REV CODE 636 W HCPCS: Performed by: PHYSICIAN ASSISTANT

## 2023-06-27 PROCEDURE — 94761 N-INVAS EAR/PLS OXIMETRY MLT: CPT

## 2023-06-27 PROCEDURE — 87070 CULTURE OTHR SPECIMN AEROBIC: CPT | Performed by: STUDENT IN AN ORGANIZED HEALTH CARE EDUCATION/TRAINING PROGRAM

## 2023-06-27 PROCEDURE — 87077 CULTURE AEROBIC IDENTIFY: CPT | Performed by: STUDENT IN AN ORGANIZED HEALTH CARE EDUCATION/TRAINING PROGRAM

## 2023-06-27 PROCEDURE — 87102 FUNGUS ISOLATION CULTURE: CPT | Performed by: STUDENT IN AN ORGANIZED HEALTH CARE EDUCATION/TRAINING PROGRAM

## 2023-06-27 PROCEDURE — 97535 SELF CARE MNGMENT TRAINING: CPT | Mod: CO

## 2023-06-27 RX ORDER — METHOCARBAMOL 500 MG/1
1000 TABLET, FILM COATED ORAL 4 TIMES DAILY
Status: DISCONTINUED | OUTPATIENT
Start: 2023-06-27 | End: 2023-06-28 | Stop reason: HOSPADM

## 2023-06-27 RX ADMIN — CELECOXIB 200 MG: 200 CAPSULE ORAL at 09:06

## 2023-06-27 RX ADMIN — PREGABALIN 100 MG: 50 CAPSULE ORAL at 08:06

## 2023-06-27 RX ADMIN — OXYBUTYNIN CHLORIDE 5 MG: 5 TABLET, EXTENDED RELEASE ORAL at 09:06

## 2023-06-27 RX ADMIN — PREGABALIN 100 MG: 50 CAPSULE ORAL at 03:06

## 2023-06-27 RX ADMIN — METHOCARBAMOL 750 MG: 750 TABLET ORAL at 12:06

## 2023-06-27 RX ADMIN — HYDROMORPHONE HYDROCHLORIDE 4 MG: 2 TABLET ORAL at 12:06

## 2023-06-27 RX ADMIN — ACETAMINOPHEN 650 MG: 325 TABLET ORAL at 09:06

## 2023-06-27 RX ADMIN — METHOCARBAMOL 1000 MG: 500 TABLET ORAL at 11:06

## 2023-06-27 RX ADMIN — LEFLUNOMIDE 20 MG: 20 TABLET ORAL at 09:06

## 2023-06-27 RX ADMIN — CEFTRIAXONE 2 G: 2 INJECTION, POWDER, FOR SOLUTION INTRAMUSCULAR; INTRAVENOUS at 08:06

## 2023-06-27 RX ADMIN — CEFTRIAXONE 2 G: 2 INJECTION, POWDER, FOR SOLUTION INTRAMUSCULAR; INTRAVENOUS at 09:06

## 2023-06-27 RX ADMIN — HYDROMORPHONE HYDROCHLORIDE 4 MG: 2 TABLET ORAL at 06:06

## 2023-06-27 RX ADMIN — FUROSEMIDE 20 MG: 20 TABLET ORAL at 09:06

## 2023-06-27 RX ADMIN — HYDROMORPHONE HYDROCHLORIDE 4 MG: 2 TABLET ORAL at 09:06

## 2023-06-27 RX ADMIN — ACETAMINOPHEN 650 MG: 325 TABLET ORAL at 03:06

## 2023-06-27 RX ADMIN — MUPIROCIN: 20 OINTMENT TOPICAL at 09:06

## 2023-06-27 RX ADMIN — HYDROMORPHONE HYDROCHLORIDE 4 MG: 2 TABLET ORAL at 03:06

## 2023-06-27 RX ADMIN — SENNOSIDES AND DOCUSATE SODIUM 1 TABLET: 50; 8.6 TABLET ORAL at 09:06

## 2023-06-27 RX ADMIN — ATORVASTATIN CALCIUM 80 MG: 40 TABLET, FILM COATED ORAL at 09:06

## 2023-06-27 RX ADMIN — PROMETHAZINE HYDROCHLORIDE 25 MG: 12.5 TABLET ORAL at 03:06

## 2023-06-27 RX ADMIN — PREGABALIN 100 MG: 50 CAPSULE ORAL at 09:06

## 2023-06-27 RX ADMIN — HEPARIN SODIUM 5000 UNITS: 5000 INJECTION INTRAVENOUS; SUBCUTANEOUS at 11:06

## 2023-06-27 RX ADMIN — POLYETHYLENE GLYCOL 3350 17 G: 17 POWDER, FOR SOLUTION ORAL at 09:06

## 2023-06-27 RX ADMIN — HEPARIN SODIUM 5000 UNITS: 5000 INJECTION INTRAVENOUS; SUBCUTANEOUS at 06:06

## 2023-06-27 RX ADMIN — FAMOTIDINE 20 MG: 20 TABLET, FILM COATED ORAL at 09:06

## 2023-06-27 RX ADMIN — METHOCARBAMOL 1000 MG: 500 TABLET ORAL at 04:06

## 2023-06-27 RX ADMIN — SENNOSIDES AND DOCUSATE SODIUM 1 TABLET: 50; 8.6 TABLET ORAL at 08:06

## 2023-06-27 RX ADMIN — METHOCARBAMOL 750 MG: 750 TABLET ORAL at 09:06

## 2023-06-27 RX ADMIN — HEPARIN SODIUM 5000 UNITS: 5000 INJECTION INTRAVENOUS; SUBCUTANEOUS at 03:06

## 2023-06-27 NOTE — PLAN OF CARE
Pt. Has met goals and does not require OT Services at this time.   Problem: Occupational Therapy  Goal: Occupational Therapy Goal  Description: Goals to be met by: 7/14/23     Patient will increase functional independence with ADLs by performing:    UE Dressing with Modified [place]. MET 6/27/23  LE Dressing with Modified [place]. MET 6/27/23  Grooming while standing with Modified [place]. MET 6/27/23  Toileting from toilet/bedside commode with Modified [place] for hygiene and clothing management. MET 6/27/23  Toilet transfer to toilet/bedside commode with Modified [place] and LRAD as needed. MET 6/27/23 6/27/2023 1626 by Susana Carballo, OT  Reactivated  6/27/2023 1626 by Susana Carballo OT  Outcome: Met

## 2023-06-27 NOTE — ASSESSMENT & PLAN NOTE
Joyce Peterson is a 62 y.o. female s/p L4/L5 TLIF on     -I&D of superficial R knee abscess performed at bedside. 5cc of pus noted. Cx sent. Plan to continue 24 hrs IV rocephin and dc home tomorrow on PO doxycycline.      Pain control: MM  PT/OT: WBAT BLE  DVT PPx: heparin 5k tid, SCDs at all times when not ambulating  Abx: completed  Labs: stable  Drain: dced  Cervantes: dced    Dispo: home with HH tomorrow    Procedure Note: superficial R knee irrigation and debridement  Risks, benefits, and alternatives to treatment was explained to patient at length. Patient verbalized their understanding and gave consent to proceed. Time out was performed and patient name, , site, and procedure were confirmed. 10 cc of 1% lidocaine was used for local block. Skin was sterilely prepared with betadine. Sterile field was prepped around the surgical site. 1 cm skin incision was made over abscess with scalpel. Incision was taken down bluntly with a hemostat to fluid collection. 5 cc of yellow colored fluid was expressed. Fluid sample was obtained and sent for analysis and cultures. Incision was irrigated with NS and betadine.  Soft dressings and. Patient encouraged to keep extremity elevated. Patient tolerated the procedure well. Blood loss was minimal.

## 2023-06-27 NOTE — SUBJECTIVE & OBJECTIVE
"Principal Problem:Lumbar stenosis    Principal Orthopedic Problem: s/p L4/L5 TLIF on 6/22    Interval History: NAEON. VSS. Continued low back pain. Pt now with 2cm x 2cm area of erythema, fluctuance and ttp about tibial tubercle concerning for abscess.     Review of patient's allergies indicates:   Allergen Reactions    Actemra [tocilizumab] Other (See Comments)     Severe dizziness    Codeine Nausea And Vomiting and Hives    Gold au 198 Hives and Rash    Hydroxychloroquine Other (See Comments)     Can't remember the reaction      Iodinated contrast media Other (See Comments)     Other reaction(s): BURNING ALL OVER    Iodine Other (See Comments) and Hives     Other reaction(s): BURNING ALL OVER - Iodine dye - Not topical    Ondansetron Nausea And Vomiting    Sulfa (sulfonamide antibiotics) Other (See Comments)     Can't remember the reaction    Zofran [ondansetron hcl (pf)] Nausea And Vomiting     Pt reports that last time she received zofran she started vomiting again    Methotrexate analogues Nausea Only    Pneumococcal vaccine Other (See Comments)    Pneumovax 23 [pneumococcal 23-argenis ps vaccine] Other (See Comments)     "sick"    Methotrexate Nausea Only       Current Facility-Administered Medications   Medication    acetaminophen tablet 650 mg    albuterol-ipratropium 2.5 mg-0.5 mg/3 mL nebulizer solution 3 mL    atorvastatin tablet 80 mg    cefTRIAXone (ROCEPHIN) 2 g in dextrose 5 % in water (D5W) 5 % 100 mL IVPB (MB+)    celecoxib capsule 200 mg    famotidine tablet 20 mg    fluticasone furoate-vilanteroL 100-25 mcg/dose diskus inhaler 1 puff    furosemide tablet 20 mg    heparin (porcine) injection 5,000 Units    HYDROmorphone tablet 2 mg    And    HYDROmorphone tablet 4 mg    leflunomide tablet 20 mg    methocarbamoL tablet 750 mg    oxybutynin 24 hr tablet 5 mg    oxyCODONE immediate release tablet 5 mg    oxyCODONE immediate release tablet Tab 10 mg    polyethylene glycol packet 17 g    pregabalin capsule " "100 mg    promethazine tablet 25 mg    senna-docusate 8.6-50 mg per tablet 1 tablet    sodium chloride 0.9% flush 5 mL     Objective:     Vital Signs (Most Recent):  Temp: 98.2 °F (36.8 °C) (06/27/23 1146)  Pulse: 87 (06/27/23 1146)  Resp: 18 (06/27/23 1146)  BP: (!) 95/51 (06/27/23 1146)  SpO2: 95 % (06/27/23 1146) Vital Signs (24h Range):  Temp:  [97.6 °F (36.4 °C)-99.2 °F (37.3 °C)] 98.2 °F (36.8 °C)  Pulse:  [72-92] 87  Resp:  [16-18] 18  SpO2:  [94 %-100 %] 95 %  BP: ()/(51-62) 95/51     Weight: 68.2 kg (150 lb 5.7 oz)  Height: 5' 1" (154.9 cm)  Body mass index is 28.41 kg/m².    No intake or output data in the 24 hours ending 06/27/23 1149       Ortho/SPM Exam   Awake/alert/oriented x3, No acute distress, Afebrile, Vital signs stable  Good inspiratory effort with unlaboured breathing  Dressings c/d/I  2cm x 2cm area of erythema to right tibial tubercle with obvious fluctuance concerning for abscess  NVI BUE/BLE    Significant Labs: All pertinent labs within the past 24 hours have been reviewed.    Significant Imaging: I have reviewed all pertinent imaging results/findings.  "

## 2023-06-27 NOTE — PT/OT/SLP PROGRESS
Occupational Therapy   Treatment    Name: Joyce Peterson  MRN: [id number]  Admitting Diagnosis:  Lumbar stenosis  5 Days Post-Op  Procedure(s):  FUSION, SPINE, LUMBAR, TLIF, POSTERIOR APPROACH, USING PEDICLE SCREW L4/5 spinewave SNS:SSEP/EMG   Recommendations:     Discharge Recommendations: other (see comments)  Discharge Equipment Recommendations:  walker, rolling  Barriers to discharge:  None    Assessment:     Joyce Peterson is a 62 y.o. female with a medical diagnosis of Lumbar stenosis.  She presents with the following performance deficits affecting function are weakness, impaired endurance, decreased ROM, orthopedic precautions, pain, decreased coordination. Patient is progressing well with OT intervention.     Rehab Prognosis:  Good; patient would benefit from acute skilled OT services to address these deficits and reach maximum level of function.       Plan:     Patient to be seen 3 x/week to address the above listed problems via self-care/home management, therapeutic activities, therapeutic exercises, neuromuscular re-education  Plan of Care Expires: 07/23/23  Plan of Care Reviewed with: patient, spouse    Subjective     Chief Complaint: c/o mild muscle pain around incision intermittently  Patient/Family Comments/goals: get stronger  Pain/Comfort:  Pain Rating 1: 0/10  Pain Rating Post-Intervention 1: 0/10    Objective:     Communicated with: nurse yanet and OTR prior to session.  Patient found right sidelying with  (no active lines) upon OT entry to room.  A client care conference was completed by the OTR and the MESSER prior to treatment by the MESSER to discuss the patient's POC and current status.    General Precautions: Standard, fall    Orthopedic Precautions:spinal precautions  Braces: N/A  Respiratory Status: Room air     Occupational Performance:     Bed Mobility:    Patient completed Supine to Sit with independence HOB flat    Functional Mobility/Transfers:  Patient completed Sit <> Stand  Transfer with modified independence  with  no assistive device   Patient completed Toilet Transfer Step Transfer technique with supervision with  rolling walker and grab bars  Functional Mobility: within room and hallway ~100ft x2 using RW with (S)    Activities of Daily Living:  Grooming: supervision hand hygiene in standing  Upper Body Dressing: supervision don gown like a robe in standing   Lower Body Dressing: (S) simulating fig 4 technique   Toileting: supervision gown management and perineal hygiene      [facility] 6 Click ADL: 23    Treatment & Education:  Patient educated on OT POC, goals, role of OT in acute care setting, and current progress  Patient reports doing ADLs and ambulating in halls throughout the day with spouse. Patient is agreeable to being d/c from OT services  Addressed all patient/spouse questions/concerns within MESSER scope of practice.     Patient left ambulatory gloria with   present    GOALS:   Multidisciplinary Problems       Occupational Therapy Goals          Problem: Occupational Therapy    Goal Priority Disciplines Outcome Interventions   Occupational Therapy Goal     OT, PT/OT Ongoing, Progressing    Description: Goals to be met by: 7/14/23     Patient will increase functional independence with ADLs by performing:    UE Dressing with Modified [place].  LE Dressing with Modified [place].  Grooming while standing with Modified [place].  Toileting from toilet/bedside commode with Modified [place] for hygiene and clothing management.   Toilet transfer to toilet/bedside commode with Modified [place] and LRAD as needed.                         Time Tracking:     OT Date of Treatment: 06/27/23  OT Start Time: 1352  OT Stop Time: 1408  OT Total Time (min): 16 min    Billable Minutes:Self Care/Home Management 16    OT/OLIVIA: OLIVIA     Number of OLIVIA visits since last OT visit: 1    6/27/2023

## 2023-06-27 NOTE — CONSULTS
[facility]  Wound Care    Patient Name:  Joyce Peterson   MRN:  [id number]  Date: 6/27/2023  Diagnosis: Lumbar stenosis    History:     Past Medical History:   Diagnosis Date    Acid reflux     Allergy     Anemia     Asthma     Coronary artery disease     CS (cervical spondylosis) 03/08/2013    Degenerative disc disease     Degenerative joint disease of hindfoot, left 6/28/2022    Dry eyes     Dry mouth     Gastroparesis     History of methotrexate therapy 01/19/2022    Hyperlipidemia     Lateral meniscus derangement 04/06/2016    Lobular carcinoma in situ     Lumbar spondylosis 03/08/2013    Osteoarthritis     Osteoporosis     Pes planovalgus, acquired, left 6/28/2022    Rheumatoid arthritis(714.0)     Rupture of left triceps tendon 10/17/2018    Umbilical hernia 08/13/2015       Social History     Socioeconomic History    Marital status:    Tobacco Use    Smoking status: Never    Smokeless tobacco: Never   Substance and Sexual Activity    Alcohol use: Yes     Comment: 2-3 times per year.    Drug use: No    Sexual activity: Yes     Partners: Male     Birth control/protection: Surgical     Social Determinants of Health     Financial Resource Strain: Low Risk     Difficulty of Paying Living Expenses: Not hard at all   Food Insecurity: No Food Insecurity    Worried About Running Out of Food in the Last Year: Never true    Ran Out of Food in the Last Year: Never true   Transportation Needs: No Transportation Needs    Lack of Transportation (Medical): No    Lack of Transportation (Non-Medical): No   Physical Activity: Inactive    Days of Exercise per Week: 0 days    Minutes of Exercise per Session: 0 min   Stress: No Stress Concern Present    Feeling of Stress : Not at all   Social Connections: Socially Integrated    Frequency of Communication with Friends and Family: More than three times a week    Frequency of Social Gatherings with Friends and Family: More than three times a week    Attends [employer]  Services: More than 4 times per year    Active Member of Clubs or Organizations: Yes    Attends Club or Organization Meetings: More than 4 times per year    Marital Status:    Housing Stability: Low Risk     Unable to Pay for Housing in the Last Year: No    Number of Places Lived in the Last Year: 1    Unstable Housing in the Last Year: No       Precautions:     Allergies as of 06/07/2023 - Reviewed 06/07/2023   Allergen Reaction Noted    Actemra [tocilizumab] Other (See Comments) 06/16/2015    Codeine Nausea And Vomiting and Hives 08/01/2012    Gold au 198 Hives and Rash 08/01/2012    Hydroxychloroquine Other (See Comments) 08/01/2012    Iodinated contrast media Other (See Comments) 08/01/2012    Iodine Other (See Comments) and Hives 07/31/2012    Ondansetron Nausea And Vomiting 02/06/2016    Sulfa (sulfonamide antibiotics) Other (See Comments) 08/01/2012    Zofran [ondansetron hcl (pf)] Nausea And Vomiting 02/07/2016    Methotrexate analogues Nausea Only 11/01/2016    Pneumococcal vaccine Other (See Comments) 07/26/2016    Pneumovax 23 [pneumococcal 23-argenis ps vaccine] Other (See Comments) 07/27/2016    Methotrexate Nausea Only 10/31/2016       WOC Assessment Details/Treatment       Wound consult received on RLE weeping.   RLE edematous, patient reports improved. Dressing noted to right lower knee, recently I&D by ortho today, will defer to ortho. No active weeping noted. Re-consult wound care if needed.

## 2023-06-27 NOTE — PROGRESS NOTES
"[facility]  Orthopedics  Progress Note    Patient Name: Joyce Peterson  MRN: [id number]  Admission Date: 6/22/2023  Hospital Length of Stay: 5 days  Attending Provider: Jh Mosqueda MD  Primary Care Provider: Krystal Singleton,   Follow-up For: Procedure(s) (LRB):  FUSION, SPINE, LUMBAR, TLIF, POSTERIOR APPROACH, USING PEDICLE SCREW L4/5 spinewave SNS:SSEP/EMG (N/A)    Post-Operative Day: 5 Days Post-Op  Subjective:     Principal Problem:Lumbar stenosis    Principal Orthopedic Problem: s/p L4/L5 TLIF on 6/22    Interval History: NAEON. VSS. Continued low back pain. Pt now with 2cm x 2cm area of erythema, fluctuance and ttp about tibial tubercle concerning for abscess.     Review of patient's allergies indicates:   Allergen Reactions    Actemra [tocilizumab] Other (See Comments)     Severe dizziness    Codeine Nausea And Vomiting and Hives    Gold au 198 Hives and Rash    Hydroxychloroquine Other (See Comments)     Can't remember the reaction      Iodinated contrast media Other (See Comments)     Other reaction(s): BURNING ALL OVER    Iodine Other (See Comments) and Hives     Other reaction(s): BURNING ALL OVER - Iodine dye - Not topical    Ondansetron Nausea And Vomiting    Sulfa (sulfonamide antibiotics) Other (See Comments)     Can't remember the reaction    Zofran [ondansetron hcl (pf)] Nausea And Vomiting     Pt reports that last time she received zofran she started vomiting again    Methotrexate analogues Nausea Only    Pneumococcal vaccine Other (See Comments)    Pneumovax 23 [pneumococcal 23-argenis ps vaccine] Other (See Comments)     "sick"    Methotrexate Nausea Only       Current Facility-Administered Medications   Medication    acetaminophen tablet 650 mg    albuterol-ipratropium 2.5 mg-0.5 mg/3 mL nebulizer solution 3 mL    atorvastatin tablet 80 mg    cefTRIAXone (ROCEPHIN) 2 g in dextrose 5 % in water (D5W) 5 % 100 mL IVPB (MB+)    celecoxib capsule 200 mg    famotidine " "tablet 20 mg    fluticasone furoate-vilanteroL 100-25 mcg/dose diskus inhaler 1 puff    furosemide tablet 20 mg    heparin (porcine) injection 5,000 Units    HYDROmorphone tablet 2 mg    And    HYDROmorphone tablet 4 mg    leflunomide tablet 20 mg    methocarbamoL tablet 750 mg    oxybutynin 24 hr tablet 5 mg    oxyCODONE immediate release tablet 5 mg    oxyCODONE immediate release tablet Tab 10 mg    polyethylene glycol packet 17 g    pregabalin capsule 100 mg    promethazine tablet 25 mg    senna-docusate 8.6-50 mg per tablet 1 tablet    sodium chloride 0.9% flush 5 mL     Objective:     Vital Signs (Most Recent):  Temp: 98.2 °F (36.8 °C) (06/27/23 1146)  Pulse: 87 (06/27/23 1146)  Resp: 18 (06/27/23 1146)  BP: (!) 95/51 (06/27/23 1146)  SpO2: 95 % (06/27/23 1146) Vital Signs (24h Range):  Temp:  [97.6 °F (36.4 °C)-99.2 °F (37.3 °C)] 98.2 °F (36.8 °C)  Pulse:  [72-92] 87  Resp:  [16-18] 18  SpO2:  [94 %-100 %] 95 %  BP: ()/(51-62) 95/51     Weight: 68.2 kg (150 lb 5.7 oz)  Height: 5' 1" (154.9 cm)  Body mass index is 28.41 kg/m².    No intake or output data in the 24 hours ending 06/27/23 1149      Ortho/SPM Exam   Awake/alert/oriented x3, No acute distress, Afebrile, Vital signs stable  Good inspiratory effort with unlaboured breathing  Dressings c/d/I  2cm x 2cm area of erythema to right tibial tubercle with obvious fluctuance concerning for abscess  NVI BUE/BLE    Significant Labs: All pertinent labs within the past 24 hours have been reviewed.    Significant Imaging: I have reviewed all pertinent imaging results/findings.    Assessment/Plan:     * Lumbar stenosis  Joyce Peterson is a 62 y.o. female s/p L4/L5 TLIF on 6/22    -I&D of superficial R knee abscess performed at bedside. 5cc of pus noted. Cx sent. Plan to continue 24 hrs IV rocephin and dc home tomorrow on PO doxycycline.      Pain control: MM  PT/OT: WBAT BLE  DVT PPx: heparin 5k tid, SCDs at all times when not " ambulating  Abx: completed  Labs: stable  Drain: dced  Cervantes: dced    Dispo: home with  tomorrow    Procedure Note: superficial R knee irrigation and debridement  Risks, benefits, and alternatives to treatment was explained to patient at length. Patient verbalized their understanding and gave consent to proceed. Time out was performed and patient name, , site, and procedure were confirmed. 10 cc of 1% lidocaine was used for local block. Skin was sterilely prepared with betadine. Sterile field was prepped around the surgical site. 1 cm skin incision was made over abscess with scalpel. Incision was taken down bluntly with a hemostat to fluid collection. 5 cc of yellow colored fluid was expressed. Fluid sample was obtained and sent for analysis and cultures. Incision was irrigated with NS and betadine.  Soft dressings and. Patient encouraged to keep extremity elevated. Patient tolerated the procedure well. Blood loss was minimal.             Guzman Mohan MD  Orthopedics  [facility] - Surgery

## 2023-06-28 VITALS
RESPIRATION RATE: 18 BRPM | DIASTOLIC BLOOD PRESSURE: 74 MMHG | TEMPERATURE: 99 F | OXYGEN SATURATION: 99 % | BODY MASS INDEX: 28.39 KG/M2 | SYSTOLIC BLOOD PRESSURE: 135 MMHG | HEIGHT: 61 IN | WEIGHT: 150.38 LBS | HEART RATE: 77 BPM

## 2023-06-28 PROCEDURE — 97116 GAIT TRAINING THERAPY: CPT | Mod: CQ

## 2023-06-28 PROCEDURE — 97530 THERAPEUTIC ACTIVITIES: CPT | Mod: CQ

## 2023-06-28 PROCEDURE — 63600175 PHARM REV CODE 636 W HCPCS: Performed by: PHYSICIAN ASSISTANT

## 2023-06-28 PROCEDURE — 25000003 PHARM REV CODE 250: Performed by: STUDENT IN AN ORGANIZED HEALTH CARE EDUCATION/TRAINING PROGRAM

## 2023-06-28 PROCEDURE — 63600175 PHARM REV CODE 636 W HCPCS: Performed by: STUDENT IN AN ORGANIZED HEALTH CARE EDUCATION/TRAINING PROGRAM

## 2023-06-28 PROCEDURE — 25000003 PHARM REV CODE 250: Performed by: PHYSICIAN ASSISTANT

## 2023-06-28 RX ORDER — CELECOXIB 200 MG/1
200 CAPSULE ORAL DAILY
Qty: 21 CAPSULE | Refills: 0 | Status: SHIPPED | OUTPATIENT
Start: 2023-06-28 | End: 2023-09-06

## 2023-06-28 RX ORDER — ACETAMINOPHEN 500 MG
1000 TABLET ORAL EVERY 8 HOURS
Qty: 60 TABLET | Refills: 0 | Status: SHIPPED | OUTPATIENT
Start: 2023-06-28 | End: 2023-11-07

## 2023-06-28 RX ORDER — HYDROMORPHONE HYDROCHLORIDE 2 MG/1
2 TABLET ORAL EVERY 4 HOURS PRN
Qty: 42 TABLET | Refills: 0 | Status: SHIPPED | OUTPATIENT
Start: 2023-06-28 | End: 2023-07-18

## 2023-06-28 RX ORDER — METHOCARBAMOL 500 MG/1
1000 TABLET, FILM COATED ORAL 4 TIMES DAILY
Qty: 112 TABLET | Refills: 0 | Status: SHIPPED | OUTPATIENT
Start: 2023-06-28 | End: 2023-07-12

## 2023-06-28 RX ORDER — DOXYCYCLINE HYCLATE 100 MG
100 TABLET ORAL EVERY 12 HOURS
Qty: 20 TABLET | Refills: 0 | Status: SHIPPED | OUTPATIENT
Start: 2023-06-28 | End: 2023-07-07 | Stop reason: SDUPTHER

## 2023-06-28 RX ADMIN — CEFTRIAXONE 2 G: 2 INJECTION, POWDER, FOR SOLUTION INTRAMUSCULAR; INTRAVENOUS at 06:06

## 2023-06-28 RX ADMIN — PROMETHAZINE HYDROCHLORIDE 25 MG: 12.5 TABLET ORAL at 10:06

## 2023-06-28 RX ADMIN — PREGABALIN 100 MG: 50 CAPSULE ORAL at 08:06

## 2023-06-28 RX ADMIN — METHOCARBAMOL 1000 MG: 500 TABLET ORAL at 08:06

## 2023-06-28 RX ADMIN — ACETAMINOPHEN 650 MG: 325 TABLET ORAL at 09:06

## 2023-06-28 RX ADMIN — HYDROMORPHONE HYDROCHLORIDE 4 MG: 2 TABLET ORAL at 12:06

## 2023-06-28 RX ADMIN — HYDROMORPHONE HYDROCHLORIDE 4 MG: 2 TABLET ORAL at 05:06

## 2023-06-28 RX ADMIN — POLYETHYLENE GLYCOL 3350 17 G: 17 POWDER, FOR SOLUTION ORAL at 08:06

## 2023-06-28 RX ADMIN — CELECOXIB 200 MG: 200 CAPSULE ORAL at 08:06

## 2023-06-28 RX ADMIN — OXYBUTYNIN CHLORIDE 5 MG: 5 TABLET, EXTENDED RELEASE ORAL at 08:06

## 2023-06-28 RX ADMIN — HYDROMORPHONE HYDROCHLORIDE 4 MG: 2 TABLET ORAL at 08:06

## 2023-06-28 RX ADMIN — HYDROMORPHONE HYDROCHLORIDE 4 MG: 2 TABLET ORAL at 02:06

## 2023-06-28 RX ADMIN — ACETAMINOPHEN 650 MG: 325 TABLET ORAL at 03:06

## 2023-06-28 RX ADMIN — LEFLUNOMIDE 20 MG: 20 TABLET ORAL at 08:06

## 2023-06-28 RX ADMIN — SENNOSIDES AND DOCUSATE SODIUM 1 TABLET: 50; 8.6 TABLET ORAL at 08:06

## 2023-06-28 RX ADMIN — FUROSEMIDE 20 MG: 20 TABLET ORAL at 08:06

## 2023-06-28 RX ADMIN — HEPARIN SODIUM 5000 UNITS: 5000 INJECTION INTRAVENOUS; SUBCUTANEOUS at 06:06

## 2023-06-28 RX ADMIN — ATORVASTATIN CALCIUM 80 MG: 40 TABLET, FILM COATED ORAL at 08:06

## 2023-06-28 NOTE — PLAN OF CARE
[facility]  Discharge Final Note    Primary Care Provider: Krystal Singleton DO    Expected Discharge Date: 6/28/2023    Final Discharge Note (most recent)       Final Note - 06/28/23 1421          Final Note    Assessment Type Final Discharge Note     Anticipated Discharge Disposition Home-Health Care [facility]     What phone number can be called within the next 1-3 days to see how you are doing after discharge? --   [phone]    Hospital Resources/Appts/Education Provided Appointments scheduled and added to AVS        Post-Acute Status    Post-Acute Authorization Home Health     Home Health Status Set-up Complete/Auth obtained                     Important Message from Medicare  Important Message from Medicare regarding Discharge Appeal Rights: Given to patient/caregiver, Explained to patient/caregiver, Signed/date by patient/caregiver     Date IMM was signed: 06/28/23  Time IMM was signed: 1107      Future Appointments   Date Time Provider Department Center   7/10/2023  1:30 PM Kassi Moura PA-C [facility]   7/13/2023  1:40 PM Love Whaley MD [facility]   7/24/2023  7:15 AM [facility]   7/24/2023  8:00 AM Krystal Ross PA-C [facility] SP[facility]   7/25/2023 10:00 AM LAB, [facility]   8/23/2023  1:30 PM Patrice Jurado, [place] [facility] OPTOMTY [facility]   9/5/2023  8:20 AM [facility] LAB BMT [facility] LAB[facility]   9/5/2023  9:20 AM Manjeet Woodson MD [facility] BMT [facility]   9/6/2023 10:00 AM Isiah Moran MD [facility] RHEUM [facility]   9/6/2023 10:30 AM Isiah Steele MD [facility] ORTHO [facility]   12/8/2023 11:00 AM CHAIR 02 [facility]US[facility]     Patient discharged home to [facility] on 6/28/23.    Brigida Rojas RNCM  Case Management  [facility]  [phone]

## 2023-06-28 NOTE — SUBJECTIVE & OBJECTIVE
"Principal Problem:Lumbar stenosis    Principal Orthopedic Problem: s/p L4/L5 TLIF on 6/22    Interval History: NAEON. VSS. Pain controlled. Right knee erythema improving.     Review of patient's allergies indicates:   Allergen Reactions    Actemra [tocilizumab] Other (See Comments)     Severe dizziness    Codeine Nausea And Vomiting and Hives    Gold au 198 Hives and Rash    Hydroxychloroquine Other (See Comments)     Can't remember the reaction      Iodinated contrast media Other (See Comments)     Other reaction(s): BURNING ALL OVER    Iodine Other (See Comments) and Hives     Other reaction(s): BURNING ALL OVER - Iodine dye - Not topical    Ondansetron Nausea And Vomiting    Sulfa (sulfonamide antibiotics) Other (See Comments)     Can't remember the reaction    Zofran [ondansetron hcl (pf)] Nausea And Vomiting     Pt reports that last time she received zofran she started vomiting again    Methotrexate analogues Nausea Only    Pneumococcal vaccine Other (See Comments)    Pneumovax 23 [pneumococcal 23-argenis ps vaccine] Other (See Comments)     "sick"    Methotrexate Nausea Only       Current Facility-Administered Medications   Medication    acetaminophen tablet 650 mg    albuterol-ipratropium 2.5 mg-0.5 mg/3 mL nebulizer solution 3 mL    atorvastatin tablet 80 mg    cefTRIAXone (ROCEPHIN) 2 g in dextrose 5 % in water (D5W) 5 % 100 mL IVPB (MB+)    celecoxib capsule 200 mg    famotidine tablet 20 mg    fluticasone furoate-vilanteroL 100-25 mcg/dose diskus inhaler 1 puff    furosemide tablet 20 mg    heparin (porcine) injection 5,000 Units    HYDROmorphone tablet 2 mg    And    HYDROmorphone tablet 4 mg    leflunomide tablet 20 mg    methocarbamoL tablet 1,000 mg    oxybutynin 24 hr tablet 5 mg    oxyCODONE immediate release tablet 5 mg    oxyCODONE immediate release tablet Tab 10 mg    polyethylene glycol packet 17 g    pregabalin capsule 100 mg    promethazine tablet 25 mg    senna-docusate 8.6-50 mg per tablet 1 " "tablet    sodium chloride 0.9% flush 5 mL     Objective:     Vital Signs (Most Recent):  Temp: 98.7 °F (37.1 °C) (06/28/23 0718)  Pulse: 77 (06/28/23 0718)  Resp: 18 (06/28/23 0846)  BP: 135/74 (06/28/23 0718)  SpO2: 99 % (06/28/23 0718) Vital Signs (24h Range):  Temp:  [97.9 °F (36.6 °C)-99.4 °F (37.4 °C)] 98.7 °F (37.1 °C)  Pulse:  [77-88] 77  Resp:  [16-19] 18  SpO2:  [92 %-99 %] 99 %  BP: ()/(51-74) 135/74     Weight: 68.2 kg (150 lb 5.7 oz)  Height: 5' 1" (154.9 cm)  Body mass index is 28.41 kg/m².    No intake or output data in the 24 hours ending 06/28/23 1022       Ortho/SPM Exam   Awake/alert/oriented x3, No acute distress, Afebrile, Vital signs stable  Good inspiratory effort with unlaboured breathing  Dressings c/d/I  NVI BUE/BLE    Significant Labs: All pertinent labs within the past 24 hours have been reviewed.    Significant Imaging: I have reviewed all pertinent imaging results/findings.  "

## 2023-06-28 NOTE — PT/OT/SLP PROGRESS
"Physical Therapy Treatment    Patient Name:  Joyce Peterson   MRN:  [id number]    Recommendations:     Discharge Recommendations: other (see comments)  Discharge Equipment Recommendations: walker, rolling  Barriers to discharge: None    Assessment:     Joyce Peterson is a 62 y.o. female admitted with a medical diagnosis of Lumbar stenosis.  She presents with the following impairments/functional limitations: weakness, impaired endurance, decreased ROM, orthopedic precautions, impaired balance .    Rehab Prognosis: Good; patient would benefit from acute skilled PT services to address these deficits and reach maximum level of function.    Recent Surgery: Procedure(s) (LRB):  FUSION, SPINE, LUMBAR, TLIF, POSTERIOR APPROACH, USING PEDICLE SCREW L4/5 spinewave SNS:SSEP/EMG (N/A) 6 Days Post-Op    Plan:     During this hospitalization, patient to be seen 4 x/week to address the identified rehab impairments via gait training, therapeutic activities, therapeutic exercises, neuromuscular re-education and progress toward the following goals:    Plan of Care Expires:  07/23/23    Subjective     Chief Complaint: "I am going home". Despite pain, pt agreeable to practice stairs before D/C  Patient/Family Comments/goals: "she is in a lot of pain"   Pain/Comfort:  Pain Rating 1: 8/10  Location 1: back  Pain Addressed 1: Distraction, Nurse notified      Objective:     Communicated with RN  prior to session.  Patient found up in chair with peripheral IV upon PT entry to room.     General Precautions: Standard, fall  Orthopedic Precautions: spinal precautions  Braces: N/A  Respiratory Status: Room air     Functional Mobility:  Transfers:     Sit to Stand:  supervision with rolling walker  Gait: x40 feet to stair well SBA  Stairs:  Pt ascended/descended 5 stair(s) with No Assistive Device with right handrail with Contact Guard Assistance.  Pt descended stairs sideways to utilize handrail in B hands      AM-PAC 6 CLICK " MOBILITY  Turning over in bed (including adjusting bedclothes, sheets and blankets)?: 3  Sitting down on and standing up from a chair with arms (e.g., wheelchair, bedside commode, etc.): 3  Moving from lying on back to sitting on the side of the bed?: 3  Moving to and from a bed to a chair (including a wheelchair)?: 3  Need to walk in hospital room?: 3  Climbing 3-5 steps with a railing?: 3  Basic Mobility Total Score: 18       Treatment & Education:  Pt and family educated on stair navigation techniques     Patient left up in chair with all lines intact, call button in reach, and family present..    GOALS:   Multidisciplinary Problems       Physical Therapy Goals          Problem: Physical Therapy    Goal Priority Disciplines Outcome Goal Variances Interventions   Physical Therapy Goal     PT, PT/OT Ongoing, Progressing     Description: Goals to met by 7/7/2023    1. Supine to sit with Modified [place]  2. Sit to supine with Modified [place]  3. Rolling to Left and Right with Modified [place].  4. Sit to stand transfer with Stand-by Assistance  5. Bed to chair transfer with Stand-by Assistance using Rolling Walker  6. Gait  x 150 feet with Contact Guard Assistance using LRAD   7. Ascend/descend 5 stair(s) with right Handrails Contact Guard Assistance using No Assistive Device.   8. Lower extremity exercise program x15 reps per Instruction, with assistance as needed in order to facilitate improved strength, improved postural control, and improvement in functional independence                         Time Tracking:     PT Received On: 06/28/23  PT Start Time: 1041     PT Stop Time: 1050  PT Total Time (min): 9 min     Billable Minutes: Gait Training 9    Treatment Type: Treatment  PT/PTA: PTA     Number of PTA visits since last PT visit: 2     06/28/2023

## 2023-06-28 NOTE — PROGRESS NOTES
"[facility]  Orthopedics  Progress Note    Patient Name: Joyce Peterson  MRN: [id number]  Admission Date: 6/22/2023  Hospital Length of Stay: 6 days  Attending Provider: Jh Mosqueda MD  Primary Care Provider: Krystal Singleton,   Follow-up For: Procedure(s) (LRB):  FUSION, SPINE, LUMBAR, TLIF, POSTERIOR APPROACH, USING PEDICLE SCREW L4/5 spinewave SNS:SSEP/EMG (N/A)    Post-Operative Day: 6 Days Post-Op  Subjective:     Principal Problem:Lumbar stenosis    Principal Orthopedic Problem: s/p L4/L5 TLIF on 6/22    Interval History: NAEON. VSS. Pain controlled. Right knee erythema improving.     Review of patient's allergies indicates:   Allergen Reactions    Actemra [tocilizumab] Other (See Comments)     Severe dizziness    Codeine Nausea And Vomiting and Hives    Gold au 198 Hives and Rash    Hydroxychloroquine Other (See Comments)     Can't remember the reaction      Iodinated contrast media Other (See Comments)     Other reaction(s): BURNING ALL OVER    Iodine Other (See Comments) and Hives     Other reaction(s): BURNING ALL OVER - Iodine dye - Not topical    Ondansetron Nausea And Vomiting    Sulfa (sulfonamide antibiotics) Other (See Comments)     Can't remember the reaction    Zofran [ondansetron hcl (pf)] Nausea And Vomiting     Pt reports that last time she received zofran she started vomiting again    Methotrexate analogues Nausea Only    Pneumococcal vaccine Other (See Comments)    Pneumovax 23 [pneumococcal 23-argenis ps vaccine] Other (See Comments)     "sick"    Methotrexate Nausea Only       Current Facility-Administered Medications   Medication    acetaminophen tablet 650 mg    albuterol-ipratropium 2.5 mg-0.5 mg/3 mL nebulizer solution 3 mL    atorvastatin tablet 80 mg    cefTRIAXone (ROCEPHIN) 2 g in dextrose 5 % in water (D5W) 5 % 100 mL IVPB (MB+)    celecoxib capsule 200 mg    famotidine tablet 20 mg    fluticasone furoate-vilanteroL 100-25 mcg/dose diskus inhaler 1 " "puff    furosemide tablet 20 mg    heparin (porcine) injection 5,000 Units    HYDROmorphone tablet 2 mg    And    HYDROmorphone tablet 4 mg    leflunomide tablet 20 mg    methocarbamoL tablet 1,000 mg    oxybutynin 24 hr tablet 5 mg    oxyCODONE immediate release tablet 5 mg    oxyCODONE immediate release tablet Tab 10 mg    polyethylene glycol packet 17 g    pregabalin capsule 100 mg    promethazine tablet 25 mg    senna-docusate 8.6-50 mg per tablet 1 tablet    sodium chloride 0.9% flush 5 mL     Objective:     Vital Signs (Most Recent):  Temp: 98.7 °F (37.1 °C) (06/28/23 0718)  Pulse: 77 (06/28/23 0718)  Resp: 18 (06/28/23 0846)  BP: 135/74 (06/28/23 0718)  SpO2: 99 % (06/28/23 0718) Vital Signs (24h Range):  Temp:  [97.9 °F (36.6 °C)-99.4 °F (37.4 °C)] 98.7 °F (37.1 °C)  Pulse:  [77-88] 77  Resp:  [16-19] 18  SpO2:  [92 %-99 %] 99 %  BP: ()/(51-74) 135/74     Weight: 68.2 kg (150 lb 5.7 oz)  Height: 5' 1" (154.9 cm)  Body mass index is 28.41 kg/m².    No intake or output data in the 24 hours ending 06/28/23 1022      Ortho/SPM Exam   Awake/alert/oriented x3, No acute distress, Afebrile, Vital signs stable  Good inspiratory effort with unlaboured breathing  Dressings c/d/I  NVI BUE/BLE    Significant Labs: All pertinent labs within the past 24 hours have been reviewed.    Significant Imaging: I have reviewed all pertinent imaging results/findings.    Assessment/Plan:     * Lumbar stenosis  Joyce Peterson is a 62 y.o. female s/p L4/L5 TLIF on 6/22    -I&D of superficial R knee abscess performed at bedside. 5cc of pus noted. Cx sent. Plan to dc on PO doxy today.    Pain control: MM  PT/OT: WBAT BLE  DVT PPx: heparin 5k tid, SCDs at all times when not ambulating  Abx: completed  Labs: stable  Drain: dced  Cervantes: dced    Dispo: home with  today            Guzman Mohan MD  Orthopedics  [facility] - Surgery  "

## 2023-06-28 NOTE — ASSESSMENT & PLAN NOTE
Joyce Peterson is a 62 y.o. female s/p L4/L5 TLIF on 6/22    -I&D of superficial R knee abscess performed at bedside. 5cc of pus noted. Cx sent. Plan to dc on PO doxy today.    Pain control: MM  PT/OT: WBAT BLE  DVT PPx: heparin 5k tid, SCDs at all times when not ambulating  Abx: completed  Labs: stable  Drain: dced  Cervantes: dced    Dispo: home with  today

## 2023-06-28 NOTE — PLAN OF CARE
CHW met with patient/family at bedside. Patient experience rounding completed and reviewed the following.     Do you know your discharge plan? Yes       If yes, what is the plan? Home    If you are discharging home, do you have help at home? Yes    Do you think you will need help at home at discharge? No     Have you discussed your needs and preferences with your SW/CM? Yes    Assigned SW/CM notified of any patient/family needs or concerns.

## 2023-06-28 NOTE — DISCHARGE SUMMARY
[facility]  Orthopedics  Discharge Summary      Patient Name: Joyce Peterson  MRN: [id number]  Admission Date: 6/22/2023  Hospital Length of Stay: 6 days  Discharge Date and Time:  06/28/2023 10:25 AM  Attending Physician: Jh Mosqueda MD   Discharging Provider: Guzman Mohan MD  Primary Care Provider: Krystal Singleton DO    HPI: Joyce Peterson is a 62 y.o. female with a pmhx of sjogrens and RA with chronic steroids (on prolia, hx of multiple fx), steroid induced osteoporosis,  here for evaluation of low back and bilateral leg pain (Back - 7, Leg - 7).  The pain in the lower back and behind both legs is what bothers her most.  The pain has been present for years, worsening over time. The patient describes the pain as aching and radiating down the back of both legs.  The pain is worse with walking and laying and improved by leaning on the shopping cart. There is positive associated numbness and tingling. There is negative subjective weakness. Prior treatments have included healthy back program, medications, and multiple pain management procedures, but no surgery. The pt has been largely using her wheelchair when she leaves the house due to pain. She takes dilaudid PO bid, robaxin and lyrica.      The patient denies myelopathic symptoms such as handwriting changes or difficulty with buttons/coins/keys. Denies perineal paresthesias, bowel/bladder dysfunction.    Procedure(s) (LRB):  FUSION, SPINE, LUMBAR, TLIF, POSTERIOR APPROACH, USING PEDICLE SCREW L4/5 spinewave SNS:SSEP/EMG (N/A)      Hospital Course: Patient presented for above procedure.  Tolerated it well and was discharged home after voiding, tolerating diet, ambulating, pain controlled and drain removal. Pt did present from home with small area of erythema to R tibial tubercle which developed into small abscess. I&D was performed at bedside and cx sent. This was improving and pt dced on PO doxy. Discharge instructions, follow-up  "appointment, and med rec are below.      Consults (From admission, onward)          Status Ordering Provider     Inpatient consult to Hospital Medicine-Ortho Comanagement Only  Once        Provider:  (Not yet assigned)    Completed JOCELYN URIBE consult case management/social work  Once        Provider:  (Not yet assigned)    Completed JARRED ALLAN            Significant Diagnostic Studies: No pertinent studies.    Pending Diagnostic Studies:       None          Final Active Diagnoses:    Diagnosis Date Noted POA    PRINCIPAL PROBLEM:  Lumbar stenosis [M48.061] 06/23/2023 Yes    Cutaneous abscess of right knee [L02.415] 06/27/2023 Yes    Hypokalemia [E87.6] 06/24/2023 Yes    Lower extremity edema [R60.0] 06/23/2023 Yes    Mild intermittent asthma without complication [J45.20] 11/04/2015 Yes      Problems Resolved During this Admission:      Discharged Condition: good    Disposition: Home-Health Care c    Follow Up:    Patient Instructions:      WALKER FOR HOME USE     Order Specific Question Answer Comments   Type of Walker: Adult (5'4"-6'6")    With wheels? Yes    Height: 5'1"    Weight: 143    Length of need (1-99 months): 99    Please check all that apply: Patient's condition impairs ambulation.      Medications:  Reconciled Home Medications:      Medication List        START taking these medications      acetaminophen 500 MG tablet  Commonly known as: TYLENOL  Take 2 tablets (1,000 mg total) by mouth every 8 (eight) hours.     celecoxib 200 MG capsule  Commonly known as: CeleBREX  Take 1 capsule (200 mg total) by mouth once daily.     doxycycline 100 MG tablet  Commonly known as: VIBRA-TABS  Take 1 tablet (100 mg total) by mouth every 12 (twelve) hours. for 10 days            CHANGE how you take these medications      * HYDROmorphone 4 MG tablet  Commonly known as: DILAUDID  Take 0.5 tablets (2 mg total) by mouth every 8 (eight) hours as needed for Pain.  What changed: Another medication " with the same name was added. Make sure you understand how and when to take each.     * HYDROmorphone 2 MG tablet  Commonly known as: DILAUDID  Take 1 tablet (2 mg total) by mouth every 4 (four) hours as needed for Pain.  What changed: You were already taking a medication with the same name, and this prescription was added. Make sure you understand how and when to take each.     * methocarbamoL 750 MG Tab  Commonly known as: ROBAXIN  Take 1 tablet (750 mg total) by mouth 3 (three) times daily.  What changed: Another medication with the same name was added. Make sure you understand how and when to take each.     * methocarbamoL 750 MG Tab  Commonly known as: ROBAXIN  Take 1 tablet (750 mg total) by mouth 4 (four) times daily.  What changed: Another medication with the same name was added. Make sure you understand how and when to take each.     * methocarbamoL 500 MG Tab  Commonly known as: ROBAXIN  Take 2 tablets (1,000 mg total) by mouth 4 (four) times daily. for 14 days  What changed: You were already taking a medication with the same name, and this prescription was added. Make sure you understand how and when to take each.           * This list has 5 medication(s) that are the same as other medications prescribed for you. Read the directions carefully, and ask your doctor or other care provider to review them with you.                CONTINUE taking these medications      albuterol 90 mcg/actuation inhaler  Commonly known as: PROVENTIL/VENTOLIN HFA  Inhale 2 puffs into the lungs every 6 (six) hours as needed. Rescue     albuterol-ipratropium 2.5 mg-0.5 mg/3 mL nebulizer solution  Commonly known as: DUO-NEB  Take 3 mLs by nebulization every 6 (six) hours as needed for Wheezing. Rescue     aspirin 325 MG tablet  Take 1 tablet (325 mg total) by mouth once daily.     budesonide-formoterol 160-4.5 mcg 160-4.5 mcg/actuation Hfaa  Commonly known as: SYMBICORT  Inhale 2 puffs into the lungs every 12 (twelve) hours.      calcium citrate-vitamin D3 315-200 mg 315 mg-5 mcg (200 unit) per tablet  Commonly known as: CITRACAL+D  Take 1 tablet by mouth 2 (two) times daily.     cycloSPORINE 0.05 % ophthalmic emulsion  Commonly known as: RESTASIS  PLACE 1 DROP INTO BOTH EYES TWICE A DAY     diclofenac sodium 1 % Gel  Commonly known as: VOLTAREN  APPLY 2 GRAMS TOPICALLY 4 (FOUR) TIMES DAILY AS NEEDED.     fluconazole 150 MG Tab  Commonly known as: DIFLUCAN  Take 150 mg by mouth as needed.     INV PROPULSID 10 MG  Take 2 tablets (20 mg) by mouth 4 (four) times daily. FOR INVESTIGATIONAL USE ONLY. Protocol CIS-[id number]  Subject ID: [id number].     KEVZARA 200 mg/1.14 mL Syrg  Generic drug: sarilumab  Inject 1.14 mL (200 mg total) into the skin every 14 (fourteen) days.     leflunomide 20 MG Tab  Commonly known as: ARAVA  TAKE 1 TABLET BY MOUTH EVERY DAY     methenamine 1 gram Tab  Commonly known as: HIPREX  Take 1 tablet (1 g total) by mouth 2 (two) times daily.     montelukast 10 mg tablet  Commonly known as: SINGULAIR  TAKE 1 TABLET BY MOUTH EVERY DAY AT NIGHT     MYRBETRIQ 25 mg Tb24 ER tablet  Generic drug: mirabegron  Take 1 tablet (25 mg total) by mouth once daily.     naproxen 500 MG tablet  Commonly known as: NAPROSYN  Take 1 tablet (500 mg total) by mouth 2 (two) times daily as needed (prn).     omeprazole 40 MG capsule  Commonly known as: PRILOSEC  Take 1 capsule (40 mg total) by mouth every morning.     omeprazole-sodium bicarbonate 40-1.1 mg-gram per capsule  Commonly known as: ZEGERID  TAKE 1 CAPSULE BY MOUTH EVERY DAY IN THE MORNING     potassium chloride SA 20 MEQ tablet  Commonly known as: K-DUR,KLOR-CON  Take 1 tablet (20 mEq total) by mouth once daily. Take with lasix     POTASSIUM ORAL  Take by mouth once daily.     pregabalin 100 MG capsule  Commonly known as: LYRICA  Take 1 capsule (100 mg total) by mouth 3 (three) times daily.     PREMARIN vaginal cream  Generic drug: conjugated estrogens  PLACE 0.5 GRAM VAGINALLY 3 (THREE)  TIMES A WEEK.     promethazine 25 MG tablet  Commonly known as: PHENERGAN  Take 1 tablet (25 mg total) by mouth every 6 (six) hours as needed for Nausea.     rosuvastatin 20 MG tablet  Commonly known as: CRESTOR  Take 1 tablet (20 mg total) by mouth every evening.     * sucralfate 1 gram tablet  Commonly known as: CARAFATE  Take 1 tablet (1 g total) by mouth 4 (four) times daily.     * sucralfate 1 gram tablet  Commonly known as: CARAFATE  TAKE 1 TABLET (1 G TOTAL) BY MOUTH 4 (FOUR) TIMES DAILY.     XIIDRA 5 % Dpet  Generic drug: lifitegrast  INSTILL ONE DROP INTO BOTH EYES TWICE A DAY           * This list has 2 medication(s) that are the same as other medications prescribed for you. Read the directions carefully, and ask your doctor or other care provider to review them with you.                STOP taking these medications      predniSONE 1 MG tablet  Commonly known as: DELTASONE     predniSONE 5 MG tablet  Commonly known as: DELTASONE            ASK your doctor about these medications      DUOBRII 0.01-0.045 % Lotn  Generic drug: halobetasol propion-tazarotene  aaa on feet and hands qhs  then vaseline and warm towel     furosemide 20 MG tablet  Commonly known as: LASIX  Take 1 tablet (20 mg total) by mouth once daily.     ketoconazole 2 % cream  Commonly known as: NIZORAL  Apply to feet twice daily for 3 weeks     magic mouthwash diphen/antac/lidoc  Swish and Spit 5 mL by mouth for 30 seconds twice daily.              Guzman Mohan MD  Orthopedics  [facility] - Surgery

## 2023-06-28 NOTE — PATIENT INSTRUCTIONS
DR. DULCE VALENTINE  POSTOPERATIVE LUMBAR FUSION INSTRUCTIONS  Antibiotics: You do not need additional antibiotics at home.  NSAIDs: Please refrain from taking ibuprofen (Advil), naproxen (Aleve), and other non  steroidal anti-inflammatory medications (NSAIDs) as they may inhibit bone healing in  the postoperative period.  Wound Care: You may remove your dressing and shower 7 days after surgery. Until  then please keep your wound clean and dry. Sponge baths are acceptable. Do not go in  a pool or hot tub until seen in clinic. Please leave the small steri-strips covering your  wound in place until they fall off naturally (2 weeks). You may notice clear suture ends  hanging from the sides of your incense after the steri-strips are removed, it is ok to clip  these with scissors.  Brace: You may be prescribed a brace, please wear this when up and walking, it is not  necessary to wear at night when sleeping.  Pain: You will be given a prescription for pain medicines and muscle relaxers before  discharge. If you pain is not adequately controlled with these medications, please call  the number below.  Infection: Signs of infection include increasing wound drainage and redness around the  wound, as well as a temperature over 101.5 degrees. It is unnecessary to take your  temperature on a routine basis. Please call the below number if you are concerned  about an infection.  Driving and Work: It is ok to return to driving and work as long as you are not taking  narcotic pain medications and can walk greater than 100 feet. Please do not lift over 10  pounds or participate in exercise or sports until cleared by Dr. Mosqueda.  Deep Venous Thrombosis (Blood Clots): Symptoms include swelling in the legs and  shortness of breath. Please call the office or proceed to the nearest emergency room if  you have any of these symptoms.  Physical Therapy: The best physical therapy after surgery is walking. Please try to  walk as much as  possible.  Follow-up: You will be scheduled for a follow-up appointment in 2-3 weeks.  Questions: During business hours please call [phone] for routine questions. For  after hours questions please call [phone] and ask to speak with the orthopaedic  resident on call.

## 2023-06-29 ENCOUNTER — PATIENT MESSAGE (OUTPATIENT)
Dept: ORTHOPEDICS | Facility: CLINIC | Age: 63
End: 2023-06-29
Payer: MEDICARE

## 2023-06-29 ENCOUNTER — NURSE TRIAGE (OUTPATIENT)
Dept: ADMINISTRATIVE | Facility: CLINIC | Age: 63
End: 2023-06-29
Payer: MEDICARE

## 2023-06-29 DIAGNOSIS — Z98.890 S/P SPINAL SURGERY: Primary | ICD-10-CM

## 2023-06-29 LAB — BACTERIA SPEC AEROBE CULT: ABNORMAL

## 2023-06-29 PROCEDURE — G0180 PR HOME HEALTH MD CERTIFICATION: ICD-10-PCS | Mod: ,,, | Performed by: ORTHOPAEDIC SURGERY

## 2023-06-29 PROCEDURE — G0180 MD CERTIFICATION HHA PATIENT: HCPCS | Mod: ,,, | Performed by: ORTHOPAEDIC SURGERY

## 2023-06-29 RX ORDER — PROMETHAZINE HYDROCHLORIDE 25 MG/1
25 TABLET ORAL EVERY 4 HOURS PRN
Qty: 30 TABLET | Refills: 0 | Status: SHIPPED | OUTPATIENT
Start: 2023-06-29 | End: 2023-11-07

## 2023-06-29 NOTE — TELEPHONE ENCOUNTER
PD day 1 caller KurtJoyce tate's  states Joyce had back surgery on 6/22/23 with Dr. Mosqueda at [facility]. Pt did not receive Promethazine rx and med is listed on dc instructions. Pt currently taking previously prescribed Promethazine but will run out soon. Triager informs caller that Promethazine recently sent to [facility]. Caller states this medication was not there when sister went to get other meds this morning. Triager offered to contact pharmacy for confirmation of rx & p/u time. Informed caller of brief hold. Triager confirms with pharmacy staff at [facility] that Promethazine rx received, currently in process and can be ready for p/u in 30 mins. Triager updated Kurt. Instructed to call back if further questions. V/u.  Reason for Disposition   Caller with prescription and triager answers question    Protocols used: Medication Refill and Renewal Call-A-OH

## 2023-06-30 ENCOUNTER — PATIENT OUTREACH (OUTPATIENT)
Dept: ADMINISTRATIVE | Facility: CLINIC | Age: 63
End: 2023-06-30
Payer: MEDICARE

## 2023-06-30 ENCOUNTER — PATIENT MESSAGE (OUTPATIENT)
Dept: ORTHOPEDICS | Facility: CLINIC | Age: 63
End: 2023-06-30
Payer: MEDICARE

## 2023-06-30 ENCOUNTER — PATIENT MESSAGE (OUTPATIENT)
Dept: FAMILY MEDICINE | Facility: CLINIC | Age: 63
End: 2023-06-30
Payer: MEDICARE

## 2023-06-30 NOTE — PROGRESS NOTES
2nd Attempt made to reach patient for TCC call. Left voicemail please call [phone] leave first name, last name, and  Anne will return your call.

## 2023-06-30 NOTE — PROGRESS NOTES
C3 nurse attempted to contact Joyce Peterson for a TCC post hospital discharge follow up call. The patient is unable to conduct the call @ this time. The patient requested a callback.    The patient does not have a scheduled HOSFU appointment within 5-7 days post hospital discharge date 06/28/23. Message sent to Physician staff to assist with HOSFU appointment scheduling.

## 2023-07-01 ENCOUNTER — PATIENT MESSAGE (OUTPATIENT)
Dept: ORTHOPEDICS | Facility: CLINIC | Age: 63
End: 2023-07-01
Payer: MEDICARE

## 2023-07-02 ENCOUNTER — PATIENT MESSAGE (OUTPATIENT)
Dept: RHEUMATOLOGY | Facility: CLINIC | Age: 63
End: 2023-07-02
Payer: MEDICARE

## 2023-07-03 ENCOUNTER — TELEPHONE (OUTPATIENT)
Dept: FAMILY MEDICINE | Facility: CLINIC | Age: 63
End: 2023-07-03
Payer: MEDICARE

## 2023-07-03 ENCOUNTER — PATIENT MESSAGE (OUTPATIENT)
Dept: ORTHOPEDICS | Facility: CLINIC | Age: 63
End: 2023-07-03
Payer: MEDICARE

## 2023-07-03 LAB — BACTERIA SPEC ANAEROBE CULT: NORMAL

## 2023-07-03 NOTE — TELEPHONE ENCOUNTER
Called and spoke with pt in regards of Dr. Singleton message, pt verbalized understanding of message.

## 2023-07-03 NOTE — TELEPHONE ENCOUNTER
Called and spoke with pt in regards of her message. Pt states that while she was in the hospital from having back surgery she found an abscess on her right knee that tested positive for staph. Pt states that it was treated in the hospital  and etc. Since pt is now home she has found another harsha on her leg that is dark in color and big in size. Pt states that it is on her right leg near her ankle. Pt states that she fears that it may be staph . Pt is currently taking oral antibiotics. Pt wants to know can you please either call in another medication for her or some topical cream. Please advise message.

## 2023-07-03 NOTE — TELEPHONE ENCOUNTER
The medication she is taking will cover staph infection anywhere in her body; she should not need another medication for this; she can apply topical antibiotic if she would like but the main thing is the oral antibiotic

## 2023-07-03 NOTE — PROGRESS NOTES
C3 nurse spoke with Joyce Peterson for a TCC post hospital discharge follow up call. The patient does not have a scheduled HOSFU appointment with Krystal Singleton DO within 5-7 days post hospital discharge date 06/28/23. C3 nurse was unable to schedule HOSFU appointment in [facility].    Message sent to PCP staff requesting they contact patient and schedule follow up appointment.

## 2023-07-03 NOTE — TELEPHONE ENCOUNTER
Prednisone; pt has pending message to provider, advisement on when to resume    @ 0908 secure chat to Guzman Mohan MD regarding clarification on patient's currently Dilaudid rx, pt has two. No response during tcc call. Routed to PCP.  @ 0916 call to Pam Morales, transferred to Yluia QUEZADA, jimmy SN services ordered, receiving PT/OT.

## 2023-07-03 NOTE — TELEPHONE ENCOUNTER
MD Kimberlee advised ok for pt to continue Dilaudid 2mg take 1 tablet every 4 hours as needed, pt taking around the clock. MD advised for patient to slowly wean off medication as pain improves. Call to patient, advised of the above, pt vu, no further needs.

## 2023-07-03 NOTE — TELEPHONE ENCOUNTER
----- Message from Karl Dudley sent at 7/3/2023 12:05 PM CDT -----  Contact: pt  Type: Requesting to speak with nurse        Who Called: PT  Regarding: staph infection on foot requesting medication .Would the patient rather a call back or a response via [facility]? Call back  Best Call Back Number: [phone]  Additional Information:

## 2023-07-06 ENCOUNTER — TELEPHONE (OUTPATIENT)
Dept: FAMILY MEDICINE | Facility: CLINIC | Age: 63
End: 2023-07-06
Payer: MEDICARE

## 2023-07-06 NOTE — TELEPHONE ENCOUNTER
Specialty Pharmacy - Refill Coordination  Specialty Pharmacy - Clinical Intervention    Specialty Medication Orders Linked to Encounter      Flowsheet Row Most Recent Value   Medication #1 sarilumab (KEVZARA) 200 mg/1.14 mL Syrg (Order#[id number], Rx#[id number])            Refill Questions - Documented Responses      Flowsheet Row Most Recent Value   Patient Availability and HIPAA Verification    Does patient want to proceed with activity? Yes   HIPAA/medical authority confirmed? Yes   Relationship to patient of person spoken to? Self   Refill Screening Questions    Changes to allergies? No   Changes to medications? Yes  [held kevzara for surgery]   New conditions since last clinic visit? No   Unplanned office visit, urgent care, ED, or hospital admission in the last 4 weeks? No   How does patient/caregiver feel medication is working? Good   Financial problems or insurance changes? No   How many doses of your specialty medications were missed in the last 4 weeks? 1   Why were doses missed? Other (comment)  [surgery]   Would patient like to speak to a pharmacist? No   When does the patient need to receive the medication? 07/13/23   Refill Delivery Questions    How will the patient receive the medication? MEDRx   When does the patient need to receive the medication? 07/13/23   Shipping Address Home   Address in [facility] confirmed and updated if neccessary? Yes   Expected Copay ($) 218.85   Is the patient able to afford the medication copay? Yes   Payment Method CC on file   Days supply of Refill 28   Supplies needed? No supplies needed   Refill activity completed? Yes   Refill activity plan Refill scheduled   Shipment/Pickup Date: 07/11/23            Current Outpatient Medications   Medication Sig    acetaminophen (TYLENOL) 500 MG tablet Take 2 tablets (1,000 mg total) by mouth every 8 (eight) hours.    albuterol (PROVENTIL/VENTOLIN HFA) 90 mcg/actuation inhaler Inhale 2 puffs into the lungs every 6 (six)  hours as needed. Rescue    albuterol-ipratropium (DUO-NEB) 2.5 mg-0.5 mg/3 mL nebulizer solution Take 3 mLs by nebulization every 6 (six) hours as needed for Wheezing. Rescue    aspirin 325 MG tablet Take 1 tablet (325 mg total) by mouth once daily. (Patient not taking: Reported on 5/26/2023)    budesonide-formoterol 160-4.5 mcg (SYMBICORT) 160-4.5 mcg/actuation HFAA Inhale 2 puffs into the lungs every 12 (twelve) hours.    calcium citrate-vitamin D3 315-200 mg (CITRACAL+D) 315 mg-5 mcg (200 unit) per tablet Take 1 tablet by mouth 2 (two) times daily.     celecoxib (CELEBREX) 200 MG capsule Take 1 capsule (200 mg total) by mouth once daily.    cycloSPORINE (RESTASIS) 0.05 % ophthalmic emulsion PLACE 1 DROP INTO BOTH EYES TWICE A DAY    diclofenac sodium (VOLTAREN) 1 % Gel APPLY 2 GRAMS TOPICALLY 4 (FOUR) TIMES DAILY AS NEEDED.    doxycycline (VIBRA-TABS) 100 MG tablet Take 1 tablet (100 mg total) by mouth every 12 (twelve) hours. for 10 days    fluconazole (DIFLUCAN) 150 MG Tab Take 150 mg by mouth as needed.    furosemide (LASIX) 20 MG tablet Take 1 tablet (20 mg total) by mouth once daily. (Patient not taking: Reported on 6/13/2023)    halobetasol propion-tazarotene (DUOBRII) 0.01-0.045 % Lotn aaa on feet and hands qhs  then vaseline and warm towel (Patient not taking: Reported on 6/13/2023)    HYDROmorphone (DILAUDID) 2 MG tablet Take 1 tablet (2 mg total) by mouth every 4 (four) hours as needed for Pain.    HYDROmorphone (DILAUDID) 4 MG tablet Take 0.5 tablets (2 mg total) by mouth every 8 (eight) hours as needed for Pain. (Patient not taking: Reported on 7/3/2023)    INV PROPULSID 10 MG Take 2 tablets (20 mg) by mouth 4 (four) times daily. FOR INVESTIGATIONAL USE ONLY. Protocol [id number]  Subject ID: [id number].    ketoconazole (NIZORAL) 2 % cream Apply to feet twice daily for 3 weeks (Patient not taking: Reported on 6/13/2023)    leflunomide (ARAVA) 20 MG Tab TAKE 1 TABLET BY MOUTH EVERY DAY    lifitegrast  (XIIDRA) 5 % Dpet INSTILL ONE DROP INTO BOTH EYES TWICE A DAY    magic mouthwash diphen/antac/lidoc Swish and Spit 5 mL by mouth for 30 seconds twice daily. (Patient not taking: Reported on 6/13/2023)    methenamine (HIPREX) 1 gram Tab Take 1 tablet (1 g total) by mouth 2 (two) times daily. (Patient not taking: Reported on 7/3/2023)    methocarbamoL (ROBAXIN) 500 MG Tab Take 2 tablets (1,000 mg total) by mouth 4 (four) times daily. for 14 days (Patient not taking: Reported on 7/3/2023)    methocarbamoL (ROBAXIN) 750 MG Tab Take 1 tablet (750 mg total) by mouth 3 (three) times daily.    methocarbamoL (ROBAXIN) 750 MG Tab Take 1 tablet (750 mg total) by mouth 4 (four) times daily. (Patient not taking: Reported on 7/3/2023)    mirabegron (MYRBETRIQ) 25 mg Tb24 ER tablet Take 1 tablet (25 mg total) by mouth once daily.    montelukast (SINGULAIR) 10 mg tablet TAKE 1 TABLET BY MOUTH EVERY DAY AT NIGHT    naproxen (NAPROSYN) 500 MG tablet Take 1 tablet (500 mg total) by mouth 2 (two) times daily as needed (prn). (Patient not taking: Reported on 7/3/2023)    omeprazole (PRILOSEC) 40 MG capsule Take 1 capsule (40 mg total) by mouth every morning.    omeprazole-sodium bicarbonate (ZEGERID) 40-1.1 mg-gram per capsule TAKE 1 CAPSULE BY MOUTH EVERY DAY IN THE MORNING    potassium chloride SA (K-DUR,KLOR-CON) 20 MEQ tablet Take 1 tablet (20 mEq total) by mouth once daily. Take with lasix (Patient not taking: Reported on 7/3/2023)    POTASSIUM ORAL Take by mouth once daily.    pregabalin (LYRICA) 100 MG capsule Take 1 capsule (100 mg total) by mouth 3 (three) times daily.    PREMARIN vaginal cream PLACE 0.5 GRAM VAGINALLY 3 (THREE) TIMES A WEEK. (Patient not taking: Reported on 7/3/2023)    promethazine (PHENERGAN) 25 MG tablet Take 1 tablet (25 mg total) by mouth every 6 (six) hours as needed for Nausea. (Patient not taking: Reported on 7/3/2023)    promethazine (PHENERGAN) 25 MG tablet Take 1 tablet (25 mg total) by mouth every  "4 (four) hours as needed for Nausea.    rosuvastatin (CRESTOR) 20 MG tablet Take 1 tablet (20 mg total) by mouth every evening.    sarilumab (KEVZARA) 200 mg/1.14 mL Syrg Inject 1.14 mL (200 mg total) into the skin every 14 (fourteen) days. (Patient not taking: Reported on 7/3/2023)    sucralfate (CARAFATE) 1 gram tablet TAKE 1 TABLET (1 G TOTAL) BY MOUTH 4 (FOUR) TIMES DAILY.    sucralfate (CARAFATE) 1 gram tablet Take 1 tablet (1 g total) by mouth 4 (four) times daily. (Patient not taking: Reported on 7/3/2023)   Last reviewed on 7/3/2023  8:57 AM by Anne Dela Cruz RN    Review of patient's allergies indicates:   Allergen Reactions    Actemra [tocilizumab] Other (See Comments)     Severe dizziness    Codeine Nausea And Vomiting and Hives    Gold au 198 Hives and Rash    Hydroxychloroquine Other (See Comments)     Can't remember the reaction      Iodinated contrast media Other (See Comments)     Other reaction(s): BURNING ALL OVER    Iodine Other (See Comments) and Hives     Other reaction(s): BURNING ALL OVER - Iodine dye - Not topical    Ondansetron Nausea And Vomiting    Sulfa (sulfonamide antibiotics) Other (See Comments)     Can't remember the reaction    Zofran [ondansetron hcl (pf)] Nausea And Vomiting     Pt reports that last time she received zofran she started vomiting again    Methotrexate analogues Nausea Only    Pneumococcal vaccine Other (See Comments)    Pneumovax 23 [pneumococcal 23-argenis ps vaccine] Other (See Comments)     "sick"    Methotrexate Nausea Only    Last reviewed on  7/5/2023 1:21 PM by J Luis Liu      Tasks added this encounter   No tasks added.   Tasks due within next 3 months   No tasks due.     Aletha Guadalupe, PharmD  [facility]  [facility]  [facility] [address]  Phone: [phone]  Fax: [phone]        "

## 2023-07-06 NOTE — TELEPHONE ENCOUNTER
Called and spoke with pt in regards of her message. Pt states that the second staph infection around her ankle has come to a head and it may need to be lanced. Pt states that she is also out of her oral antibiotic as well. Pt has an appt on  7/11/2023 to see TARA Wesley for a hospital follow up.  I see that you have a 3:00 pm spot open tomorrow. Are you ok with pt coming in tomorrow for 3:00 pm to address the staph infection and for a hospital follow up as well. Please advise pt message.

## 2023-07-06 NOTE — TELEPHONE ENCOUNTER
This is fine; pt needs to use warm soak or compress in meantime; if has come to head should be able to drain on its own with warm compress

## 2023-07-06 NOTE — TELEPHONE ENCOUNTER
Called and spoke with pt in regards of Dr. Singleton's message. Pt verbalized understanding of message.

## 2023-07-06 NOTE — TELEPHONE ENCOUNTER
----- Message from Erin Mckeon sent at 7/6/2023  9:08 AM CDT -----  Regarding: same day  Contact: [phone]  Type:  Same Day Appointment Request    Caller is requesting a same day appointment.  Caller declined first available appointment listed below.    Name of Caller: pt   When is the first available appointment?   Symptoms: ED follow up with staph infection in 2 spots. One of the spots has a head and needs to be lanced as soon as possible.   Best Call Back Number: [phone]  Additional Information:

## 2023-07-07 ENCOUNTER — PATIENT MESSAGE (OUTPATIENT)
Dept: ORTHOPEDICS | Facility: CLINIC | Age: 63
End: 2023-07-07
Payer: MEDICARE

## 2023-07-07 ENCOUNTER — OFFICE VISIT (OUTPATIENT)
Dept: FAMILY MEDICINE | Facility: CLINIC | Age: 63
End: 2023-07-07
Payer: MEDICARE

## 2023-07-07 VITALS
OXYGEN SATURATION: 98 % | DIASTOLIC BLOOD PRESSURE: 80 MMHG | WEIGHT: 130.94 LBS | SYSTOLIC BLOOD PRESSURE: 130 MMHG | HEART RATE: 107 BPM | BODY MASS INDEX: 24.72 KG/M2 | TEMPERATURE: 98 F | HEIGHT: 61 IN

## 2023-07-07 DIAGNOSIS — B95.8 STAPH INFECTION: Primary | ICD-10-CM

## 2023-07-07 DIAGNOSIS — L02.419 CELLULITIS AND ABSCESS OF LEG, EXCEPT FOOT: ICD-10-CM

## 2023-07-07 DIAGNOSIS — M79.7 FIBROMYALGIA: ICD-10-CM

## 2023-07-07 DIAGNOSIS — L03.119 CELLULITIS AND ABSCESS OF LEG, EXCEPT FOOT: ICD-10-CM

## 2023-07-07 PROCEDURE — 99214 OFFICE O/P EST MOD 30 MIN: CPT | Mod: S$GLB,,, | Performed by: STUDENT IN AN ORGANIZED HEALTH CARE EDUCATION/TRAINING PROGRAM

## 2023-07-07 PROCEDURE — 3079F DIAST BP 80-89 MM HG: CPT | Mod: CPTII,S$GLB,, | Performed by: STUDENT IN AN ORGANIZED HEALTH CARE EDUCATION/TRAINING PROGRAM

## 2023-07-07 PROCEDURE — 3008F BODY MASS INDEX DOCD: CPT | Mod: CPTII,S$GLB,, | Performed by: STUDENT IN AN ORGANIZED HEALTH CARE EDUCATION/TRAINING PROGRAM

## 2023-07-07 PROCEDURE — 3079F PR MOST RECENT DIASTOLIC BLOOD PRESSURE 80-89 MM HG: ICD-10-PCS | Mod: CPTII,S$GLB,, | Performed by: STUDENT IN AN ORGANIZED HEALTH CARE EDUCATION/TRAINING PROGRAM

## 2023-07-07 PROCEDURE — 3075F PR MOST RECENT SYSTOLIC BLOOD PRESS GE 130-139MM HG: ICD-10-PCS | Mod: CPTII,S$GLB,, | Performed by: STUDENT IN AN ORGANIZED HEALTH CARE EDUCATION/TRAINING PROGRAM

## 2023-07-07 PROCEDURE — 1160F RVW MEDS BY RX/DR IN RCRD: CPT | Mod: CPTII,S$GLB,, | Performed by: STUDENT IN AN ORGANIZED HEALTH CARE EDUCATION/TRAINING PROGRAM

## 2023-07-07 PROCEDURE — 1159F PR MEDICATION LIST DOCUMENTED IN MEDICAL RECORD: ICD-10-PCS | Mod: CPTII,S$GLB,, | Performed by: STUDENT IN AN ORGANIZED HEALTH CARE EDUCATION/TRAINING PROGRAM

## 2023-07-07 PROCEDURE — 1159F MED LIST DOCD IN RCRD: CPT | Mod: CPTII,S$GLB,, | Performed by: STUDENT IN AN ORGANIZED HEALTH CARE EDUCATION/TRAINING PROGRAM

## 2023-07-07 PROCEDURE — 99214 PR OFFICE/OUTPT VISIT, EST, LEVL IV, 30-39 MIN: ICD-10-PCS | Mod: S$GLB,,, | Performed by: STUDENT IN AN ORGANIZED HEALTH CARE EDUCATION/TRAINING PROGRAM

## 2023-07-07 PROCEDURE — 3008F PR BODY MASS INDEX (BMI) DOCUMENTED: ICD-10-PCS | Mod: CPTII,S$GLB,, | Performed by: STUDENT IN AN ORGANIZED HEALTH CARE EDUCATION/TRAINING PROGRAM

## 2023-07-07 PROCEDURE — 3075F SYST BP GE 130 - 139MM HG: CPT | Mod: CPTII,S$GLB,, | Performed by: STUDENT IN AN ORGANIZED HEALTH CARE EDUCATION/TRAINING PROGRAM

## 2023-07-07 PROCEDURE — 1160F PR REVIEW ALL MEDS BY PRESCRIBER/CLIN PHARMACIST DOCUMENTED: ICD-10-PCS | Mod: CPTII,S$GLB,, | Performed by: STUDENT IN AN ORGANIZED HEALTH CARE EDUCATION/TRAINING PROGRAM

## 2023-07-07 PROCEDURE — 99999 PR PBB SHADOW E&M-EST. PATIENT-LVL V: ICD-10-PCS | Mod: PBBFAC,,, | Performed by: STUDENT IN AN ORGANIZED HEALTH CARE EDUCATION/TRAINING PROGRAM

## 2023-07-07 PROCEDURE — 1111F DSCHRG MED/CURRENT MED MERGE: CPT | Mod: CPTII,S$GLB,, | Performed by: STUDENT IN AN ORGANIZED HEALTH CARE EDUCATION/TRAINING PROGRAM

## 2023-07-07 PROCEDURE — 1111F PR DISCHARGE MEDS RECONCILED W/ CURRENT OUTPATIENT MED LIST: ICD-10-PCS | Mod: CPTII,S$GLB,, | Performed by: STUDENT IN AN ORGANIZED HEALTH CARE EDUCATION/TRAINING PROGRAM

## 2023-07-07 PROCEDURE — 99999 PR PBB SHADOW E&M-EST. PATIENT-LVL V: CPT | Mod: PBBFAC,,, | Performed by: STUDENT IN AN ORGANIZED HEALTH CARE EDUCATION/TRAINING PROGRAM

## 2023-07-07 RX ORDER — DOXYCYCLINE HYCLATE 100 MG
100 TABLET ORAL EVERY 12 HOURS
Qty: 10 TABLET | Refills: 0 | Status: SHIPPED | OUTPATIENT
Start: 2023-07-07 | End: 2023-07-12

## 2023-07-07 RX ORDER — PREGABALIN 150 MG/1
150 CAPSULE ORAL 3 TIMES DAILY
Qty: 90 CAPSULE | Refills: 2 | Status: SHIPPED | OUTPATIENT
Start: 2023-07-07 | End: 2023-07-24 | Stop reason: SDUPTHER

## 2023-07-07 NOTE — PROGRESS NOTES
Subjective:      Patient ID: Joyce Peterson is a 62 y.o. female.    Chief Complaint: staph (Pt states that she has a mass on her right leg near her ankle )    - started with heating pad yesterday and much better today; more consolidated and less redness overall  - has 1 more day doxycycline from hospital; will increase by 5 more days  - slight improvement today so will plan to monitor closely at this time     Abscess  Chronicity:  NewProgression Since Onset: rapidly improving  Location:  Leg  Associated Symptoms: no fever, no chills, no sweats  Characteristics: itching, painful, redness and swelling    Characteristics: not draining    Treatments Tried:  NSAIDs, warm compresses and oral antibiotics  Relieved by:  Warm compresses  Worsened by:  Nothing  Review of Systems   Constitutional:  Negative for chills and fever.   Skin:  Positive for color change, rash and wound.   Psychiatric/Behavioral:  The patient is nervous/anxious.       Objective:     Vitals:    07/07/23 1505   BP: 130/80   Pulse: 107   Temp: 98.1 °F (36.7 °C)      Physical Exam  Constitutional:       Appearance: Normal appearance.   HENT:      Head: Atraumatic.   Eyes:      Conjunctiva/sclera: Conjunctivae normal.   Pulmonary:      Effort: Pulmonary effort is normal.   Skin:     Findings: Abscess and erythema present.          Neurological:      General: No focal deficit present.      Mental Status: She is alert and oriented to person, place, and time.   Psychiatric:         Mood and Affect: Mood normal.         Behavior: Behavior normal.      Assessment:         1. Staph infection    2. Cellulitis and abscess of leg, except foot          Plan:   1. Staph infection  - doxycycline (VIBRA-TABS) 100 MG tablet; Take 1 tablet (100 mg total) by mouth every 12 (twelve) hours. for 5 days  Dispense: 10 tablet; Refill: 0    2. Cellulitis and abscess of leg, except foot  - doxycycline (VIBRA-TABS) 100 MG tablet; Take 1 tablet (100 mg total) by mouth every 12  (twelve) hours. for 5 days  Dispense: 10 tablet; Refill: 0     Add 5 more days doxycycline  Continue heating pad/warm compress to help encourage rupture and drainage  ER precautions advised; STRICT  She will let us know how it is doing Monday and if continues worsening will plan to drain          Sarah A. Champagne [facility]   7/7/23

## 2023-07-10 ENCOUNTER — PATIENT MESSAGE (OUTPATIENT)
Dept: RHEUMATOLOGY | Facility: CLINIC | Age: 63
End: 2023-07-10
Payer: MEDICARE

## 2023-07-10 ENCOUNTER — PATIENT MESSAGE (OUTPATIENT)
Dept: ORTHOPEDICS | Facility: CLINIC | Age: 63
End: 2023-07-10
Payer: MEDICARE

## 2023-07-10 DIAGNOSIS — Z98.1 S/P LUMBAR FUSION: Primary | ICD-10-CM

## 2023-07-11 ENCOUNTER — OFFICE VISIT (OUTPATIENT)
Dept: FAMILY MEDICINE | Facility: CLINIC | Age: 63
End: 2023-07-11
Payer: MEDICARE

## 2023-07-11 VITALS
BODY MASS INDEX: 24.66 KG/M2 | HEART RATE: 107 BPM | TEMPERATURE: 98 F | SYSTOLIC BLOOD PRESSURE: 138 MMHG | WEIGHT: 130.63 LBS | OXYGEN SATURATION: 97 % | DIASTOLIC BLOOD PRESSURE: 64 MMHG | HEIGHT: 61 IN

## 2023-07-11 DIAGNOSIS — L03.119 CELLULITIS AND ABSCESS OF LEG, EXCEPT FOOT: Primary | ICD-10-CM

## 2023-07-11 DIAGNOSIS — Z09 HOSPITAL DISCHARGE FOLLOW-UP: ICD-10-CM

## 2023-07-11 DIAGNOSIS — B95.8 STAPH INFECTION: ICD-10-CM

## 2023-07-11 DIAGNOSIS — L02.419 CELLULITIS AND ABSCESS OF LEG, EXCEPT FOOT: Primary | ICD-10-CM

## 2023-07-11 PROCEDURE — 1159F MED LIST DOCD IN RCRD: CPT | Mod: CPTII,S$GLB,, | Performed by: STUDENT IN AN ORGANIZED HEALTH CARE EDUCATION/TRAINING PROGRAM

## 2023-07-11 PROCEDURE — 3075F SYST BP GE 130 - 139MM HG: CPT | Mod: CPTII,S$GLB,, | Performed by: STUDENT IN AN ORGANIZED HEALTH CARE EDUCATION/TRAINING PROGRAM

## 2023-07-11 PROCEDURE — 1160F RVW MEDS BY RX/DR IN RCRD: CPT | Mod: CPTII,S$GLB,, | Performed by: STUDENT IN AN ORGANIZED HEALTH CARE EDUCATION/TRAINING PROGRAM

## 2023-07-11 PROCEDURE — 3008F PR BODY MASS INDEX (BMI) DOCUMENTED: ICD-10-PCS | Mod: CPTII,S$GLB,, | Performed by: STUDENT IN AN ORGANIZED HEALTH CARE EDUCATION/TRAINING PROGRAM

## 2023-07-11 PROCEDURE — 99495 TRANSJ CARE MGMT MOD F2F 14D: CPT | Mod: S$GLB,,, | Performed by: STUDENT IN AN ORGANIZED HEALTH CARE EDUCATION/TRAINING PROGRAM

## 2023-07-11 PROCEDURE — 3075F PR MOST RECENT SYSTOLIC BLOOD PRESS GE 130-139MM HG: ICD-10-PCS | Mod: CPTII,S$GLB,, | Performed by: STUDENT IN AN ORGANIZED HEALTH CARE EDUCATION/TRAINING PROGRAM

## 2023-07-11 PROCEDURE — 87070 CULTURE OTHR SPECIMN AEROBIC: CPT | Performed by: STUDENT IN AN ORGANIZED HEALTH CARE EDUCATION/TRAINING PROGRAM

## 2023-07-11 PROCEDURE — 1159F PR MEDICATION LIST DOCUMENTED IN MEDICAL RECORD: ICD-10-PCS | Mod: CPTII,S$GLB,, | Performed by: STUDENT IN AN ORGANIZED HEALTH CARE EDUCATION/TRAINING PROGRAM

## 2023-07-11 PROCEDURE — 99999 PR PBB SHADOW E&M-EST. PATIENT-LVL V: ICD-10-PCS | Mod: PBBFAC,,, | Performed by: STUDENT IN AN ORGANIZED HEALTH CARE EDUCATION/TRAINING PROGRAM

## 2023-07-11 PROCEDURE — 1111F PR DISCHARGE MEDS RECONCILED W/ CURRENT OUTPATIENT MED LIST: ICD-10-PCS | Mod: CPTII,S$GLB,, | Performed by: STUDENT IN AN ORGANIZED HEALTH CARE EDUCATION/TRAINING PROGRAM

## 2023-07-11 PROCEDURE — 99495 TCM SERVICES (MODERATE COMPLEXITY): ICD-10-PCS | Mod: S$GLB,,, | Performed by: STUDENT IN AN ORGANIZED HEALTH CARE EDUCATION/TRAINING PROGRAM

## 2023-07-11 PROCEDURE — 3078F PR MOST RECENT DIASTOLIC BLOOD PRESSURE < 80 MM HG: ICD-10-PCS | Mod: CPTII,S$GLB,, | Performed by: STUDENT IN AN ORGANIZED HEALTH CARE EDUCATION/TRAINING PROGRAM

## 2023-07-11 PROCEDURE — 1111F DSCHRG MED/CURRENT MED MERGE: CPT | Mod: CPTII,S$GLB,, | Performed by: STUDENT IN AN ORGANIZED HEALTH CARE EDUCATION/TRAINING PROGRAM

## 2023-07-11 PROCEDURE — 3008F BODY MASS INDEX DOCD: CPT | Mod: CPTII,S$GLB,, | Performed by: STUDENT IN AN ORGANIZED HEALTH CARE EDUCATION/TRAINING PROGRAM

## 2023-07-11 PROCEDURE — 99999 PR PBB SHADOW E&M-EST. PATIENT-LVL V: CPT | Mod: PBBFAC,,, | Performed by: STUDENT IN AN ORGANIZED HEALTH CARE EDUCATION/TRAINING PROGRAM

## 2023-07-11 PROCEDURE — 3078F DIAST BP <80 MM HG: CPT | Mod: CPTII,S$GLB,, | Performed by: STUDENT IN AN ORGANIZED HEALTH CARE EDUCATION/TRAINING PROGRAM

## 2023-07-11 PROCEDURE — 1160F PR REVIEW ALL MEDS BY PRESCRIBER/CLIN PHARMACIST DOCUMENTED: ICD-10-PCS | Mod: CPTII,S$GLB,, | Performed by: STUDENT IN AN ORGANIZED HEALTH CARE EDUCATION/TRAINING PROGRAM

## 2023-07-11 RX ORDER — OXYCODONE AND ACETAMINOPHEN 5; 325 MG/1; MG/1
1 TABLET ORAL EVERY 12 HOURS PRN
Qty: 14 EACH | Refills: 0 | Status: SHIPPED | OUTPATIENT
Start: 2023-07-11 | End: 2023-07-18

## 2023-07-11 NOTE — PROCEDURES
"Incision & Drainage    Date/Time: 7/11/2023 9:30 AM  Performed by: Krystal Singleton DO  Authorized by: Krystal Singleton DO     Time out: Immediately prior to procedure a "time out" was called to verify the correct patient, procedure, equipment, support staff and site/side marked as required.    Consent Done?:  Yes (Written)    Type:  Abscess  Body area:  Lower extremity  Location details:  Right leg  Anesthesia:  Local infiltration  Incision depth: fascia    Complexity:  Simple  Drainage:  Bloody  Drainage amount:  Moderate  Wound treatment:  Drainage and incision  Packing material:  None  Patient tolerance:  Patient tolerated the procedure well with no immediate complications  "

## 2023-07-11 NOTE — PROGRESS NOTES
Transitional Care Note    Family and/or Caretaker present at visit?  Yes.  Diagnostic tests reviewed/disposition: No diagnosic tests pending after this hospitalization.  Disease/illness education: NA  Home health/community services discussion/referrals: Patient does not have home health established from hospital visit.  They do not need home health.  If needed, we will set up home health for the patient.   Establishment or re-establishment of referral orders for community resources: No other necessary community resources.   Discussion with other health care providers: No discussion with other health care providers necessary.        Subjective:      Patient ID: Joyce Peterson is a 62 y.o. female.    Chief Complaint: Hospital Follow Up    - See procedure note  - staph infection   - on abx  - site improved but new site is same not draining  - she wants I&D today   - otherwise doing well since hospital   - does want to wean off pain medication so needs Percocet so can stop taking so much dilaudid; try to stretch doses interval     Review of Systems   All other systems reviewed and are negative.     Objective:     Vitals:    07/11/23 0941   BP: 138/64   Pulse: 107   Temp: 98.2 °F (36.8 °C)      Physical Exam  Constitutional:       Appearance: Normal appearance.   HENT:      Head: Atraumatic.   Eyes:      Conjunctiva/sclera: Conjunctivae normal.   Pulmonary:      Effort: Pulmonary effort is normal.   Skin:     Findings: Erythema and lesion present.   Neurological:      General: No focal deficit present.      Mental Status: She is alert and oriented to person, place, and time.   Psychiatric:         Mood and Affect: Mood normal.         Behavior: Behavior normal.      Assessment:         1. Cellulitis and abscess of leg, except foot    2. Hospital discharge follow-up    3. Staph infection          Plan:   1. Cellulitis and abscess of leg, except foot  - Aerobic culture  - Incision & Drainage  - oxyCODONE-acetaminophen  (PERCOCET) 5-325 mg per tablet; Take 1 tablet by mouth every 12 (twelve) hours as needed for Pain.  Dispense: 14 each; Refill: 0    2. Hospital discharge follow-up    3. Staph infection       PT doing well since discharge  Complete abx  I&D today see procedure note; wound culture obtained  Stretch doses for dilaudid and Start percocet for pain instead PRN; f/u with PM otherwise; this was 1 time fill due to PM doc being out of town        Krystal Singleton   [facility]   7/11/23

## 2023-07-12 ENCOUNTER — TELEPHONE (OUTPATIENT)
Dept: ORTHOPEDICS | Facility: CLINIC | Age: 63
End: 2023-07-12
Payer: MEDICARE

## 2023-07-12 NOTE — TELEPHONE ENCOUNTER
Spoke with pt.  Rescheduled her 9/6 appointment with Dr Steele to 9/7 at 1030 am   Pt verbalized understanding.

## 2023-07-14 ENCOUNTER — PATIENT MESSAGE (OUTPATIENT)
Dept: FAMILY MEDICINE | Facility: CLINIC | Age: 63
End: 2023-07-14
Payer: MEDICARE

## 2023-07-14 LAB — BACTERIA SPEC AEROBE CULT: NO GROWTH

## 2023-07-18 DIAGNOSIS — Z98.890 S/P SPINAL SURGERY: Primary | ICD-10-CM

## 2023-07-18 RX ORDER — OXYCODONE AND ACETAMINOPHEN 5; 325 MG/1; MG/1
1 TABLET ORAL EVERY 6 HOURS PRN
Qty: 28 TABLET | Refills: 0 | Status: SHIPPED | OUTPATIENT
Start: 2023-07-18 | End: 2023-07-24 | Stop reason: SDUPTHER

## 2023-07-24 ENCOUNTER — OFFICE VISIT (OUTPATIENT)
Dept: ORTHOPEDICS | Facility: CLINIC | Age: 63
End: 2023-07-24
Payer: MEDICARE

## 2023-07-24 VITALS — BODY MASS INDEX: 24.17 KG/M2 | HEIGHT: 61 IN | WEIGHT: 128 LBS

## 2023-07-24 DIAGNOSIS — M79.7 FIBROMYALGIA: ICD-10-CM

## 2023-07-24 DIAGNOSIS — Z98.890 S/P SPINAL SURGERY: ICD-10-CM

## 2023-07-24 DIAGNOSIS — J30.89 PERENNIAL ALLERGIC RHINITIS: ICD-10-CM

## 2023-07-24 DIAGNOSIS — Z98.1 S/P LUMBAR FUSION: Primary | ICD-10-CM

## 2023-07-24 PROCEDURE — 1160F PR REVIEW ALL MEDS BY PRESCRIBER/CLIN PHARMACIST DOCUMENTED: ICD-10-PCS | Mod: CPTII,S$GLB,, | Performed by: PHYSICIAN ASSISTANT

## 2023-07-24 PROCEDURE — 3008F PR BODY MASS INDEX (BMI) DOCUMENTED: ICD-10-PCS | Mod: CPTII,S$GLB,, | Performed by: PHYSICIAN ASSISTANT

## 2023-07-24 PROCEDURE — 99024 POSTOP FOLLOW-UP VISIT: CPT | Mod: S$GLB,,, | Performed by: PHYSICIAN ASSISTANT

## 2023-07-24 PROCEDURE — 3008F BODY MASS INDEX DOCD: CPT | Mod: CPTII,S$GLB,, | Performed by: PHYSICIAN ASSISTANT

## 2023-07-24 PROCEDURE — 99024 PR POST-OP FOLLOW-UP VISIT: ICD-10-PCS | Mod: S$GLB,,, | Performed by: PHYSICIAN ASSISTANT

## 2023-07-24 PROCEDURE — 1159F MED LIST DOCD IN RCRD: CPT | Mod: CPTII,S$GLB,, | Performed by: PHYSICIAN ASSISTANT

## 2023-07-24 PROCEDURE — 1159F PR MEDICATION LIST DOCUMENTED IN MEDICAL RECORD: ICD-10-PCS | Mod: CPTII,S$GLB,, | Performed by: PHYSICIAN ASSISTANT

## 2023-07-24 PROCEDURE — 1160F RVW MEDS BY RX/DR IN RCRD: CPT | Mod: CPTII,S$GLB,, | Performed by: PHYSICIAN ASSISTANT

## 2023-07-24 PROCEDURE — 99999 PR PBB SHADOW E&M-EST. PATIENT-LVL IV: ICD-10-PCS | Mod: PBBFAC,,, | Performed by: PHYSICIAN ASSISTANT

## 2023-07-24 PROCEDURE — 99999 PR PBB SHADOW E&M-EST. PATIENT-LVL IV: CPT | Mod: PBBFAC,,, | Performed by: PHYSICIAN ASSISTANT

## 2023-07-24 RX ORDER — MONTELUKAST SODIUM 10 MG/1
10 TABLET ORAL NIGHTLY
Qty: 90 TABLET | Refills: 3 | OUTPATIENT
Start: 2023-07-24

## 2023-07-24 RX ORDER — OXYCODONE AND ACETAMINOPHEN 5; 325 MG/1; MG/1
1 TABLET ORAL EVERY 6 HOURS PRN
Qty: 28 TABLET | Refills: 0 | Status: SHIPPED | OUTPATIENT
Start: 2023-07-24 | End: 2023-07-31 | Stop reason: SDUPTHER

## 2023-07-24 RX ORDER — PREGABALIN 150 MG/1
150 CAPSULE ORAL 3 TIMES DAILY
Qty: 90 CAPSULE | Refills: 2 | Status: SHIPPED | OUTPATIENT
Start: 2023-07-24 | End: 2023-11-24 | Stop reason: SDUPTHER

## 2023-07-24 NOTE — TELEPHONE ENCOUNTER
No care due was identified.  [facility] Embedded Care Due Messages. Reference number: [id number].   7/24/2023 12:22:01 PM CDT

## 2023-07-24 NOTE — PROGRESS NOTES
Date: 07/24/2023    Supervising Physician: Jh Mosqueda M.D.    Date of Surgery: 6/22/2023    Procedure: L4/5 TLIF    History: Joyce Peterson is seen today for follow-up following the above listed procedure. Overall the patient is doing well but today notes the pain she was having before surgery is improved but she is having some buttock pain.  She still has some pain in the right leg, overall improved compared to before surgery.   Pain is well controlled with current pain medication.  She is taking lyrica, oxycodone, robaxin and advil.  she denies fever, chills, and sweats since the time of the surgery.       Exam:  Incision is healing well, clean, dry and intact.   There is no sign of infection. Neuro exam is stable. No signs of DVT.    Radiographs: imaging today shows hardware in place, no evidence of failure.     Assessment/Plan: 4 weeks post op.    Doing well postoperatively.  reviewed.  Refill of oxycodone and lyrica sent to the pharmacy. No lifting over 10 lbs.     I will plan to see the patient back for the next postop visit in 2 months with imaging.     Thank you for the opportunity to participate in this patient's care. Please give me a call if there are any concerns or questions.

## 2023-07-25 ENCOUNTER — TELEPHONE (OUTPATIENT)
Dept: RHEUMATOLOGY | Facility: CLINIC | Age: 63
End: 2023-07-25
Payer: MEDICARE

## 2023-07-25 DIAGNOSIS — E78.00 PURE HYPERCHOLESTEROLEMIA: ICD-10-CM

## 2023-07-25 DIAGNOSIS — K76.0 FATTY LIVER: Primary | ICD-10-CM

## 2023-07-25 DIAGNOSIS — E83.51 HYPOCALCEMIA: Primary | ICD-10-CM

## 2023-07-25 NOTE — TELEPHONE ENCOUNTER
Please schedule referral to Endocrinology for hypocalcemia.  Please find out how much calcium and vitamin D she is taking and let me know as may need to increase doseThank you MEGA

## 2023-07-25 NOTE — TELEPHONE ENCOUNTER
Please schedule referral to Endocrinology for hypocalcemia.  Please find out how much calcium and vitamin D she is taking and let me know as may need to increase dose.  Please schedule repeat calcium in  2wks. Thank you MEGA

## 2023-07-26 ENCOUNTER — TELEPHONE (OUTPATIENT)
Dept: HEPATOLOGY | Facility: CLINIC | Age: 63
End: 2023-07-26
Payer: MEDICARE

## 2023-07-26 ENCOUNTER — PATIENT MESSAGE (OUTPATIENT)
Dept: RHEUMATOLOGY | Facility: CLINIC | Age: 63
End: 2023-07-26
Payer: MEDICARE

## 2023-07-26 DIAGNOSIS — J30.89 PERENNIAL ALLERGIC RHINITIS: ICD-10-CM

## 2023-07-26 RX ORDER — MONTELUKAST SODIUM 10 MG/1
10 TABLET ORAL NIGHTLY
Qty: 90 TABLET | Refills: 3 | Status: SHIPPED | OUTPATIENT
Start: 2023-07-26

## 2023-07-26 RX ORDER — LIFITEGRAST 50 MG/ML
SOLUTION/ DROPS OPHTHALMIC
Qty: 60 ML | Refills: 10 | Status: SHIPPED | OUTPATIENT
Start: 2023-07-26

## 2023-07-26 NOTE — TELEPHONE ENCOUNTER
No care due was identified.  [facility] Embedded Care Due Messages. Reference number: [id number].   7/26/2023 11:54:30 AM CDT

## 2023-07-26 NOTE — TELEPHONE ENCOUNTER
----- Message from Leslee Hoffman sent at 7/26/2023 11:40 AM CDT -----  Type:  RX Refill Request    Who Called: Pt   Refill or New Rx:Rx   RX Name and Strength:montelukast (SINGULAIR) 10 mg tablet  How is the patient currently taking it? (ex. 1XDay):1x  Is this a 30 day or 90 day RX:90  Preferred Pharmacy with phone number:[facility]   Phone: [phone]  Fax:  [phone]  Would the patient rather a call back or a response via [facility]? Call back   Best Call Back Number:[phone]  Additional Information:

## 2023-07-26 NOTE — TELEPHONE ENCOUNTER
-spoke with Mrs. Cook and scheduled her follow up and fibroscan---- Message from Candida Walls NP sent at 7/25/2023  7:12 PM CDT -----  Please arrange a f/u visit with me (next available) with Fibroscan prior to visit. Thank you!  ----- Message -----  From: Isiah Moran MD  Sent: 7/25/2023   5:00 PM CDT  To: Candida Walls NP, #    The GGT liver test is elevated but less so than 6 months ago. The sodium is slightly low. The calcium is low but improving. How much calcium and vitamin D are you taking?  The alk phos  is elevated. The AST is elevated but improving the ALT is back to normal.  Will copy Candida Walls whom you saw last year in Hepatology and you should likely f/u with her.  The kidney function is normal. The CK is fine. The cbc shows  mild and improved anemia. The sed rate and crp inflammation tests are normal.

## 2023-07-27 ENCOUNTER — DOCUMENT SCAN (OUTPATIENT)
Dept: HOME HEALTH SERVICES | Facility: HOSPITAL | Age: 63
End: 2023-07-27
Payer: MEDICARE

## 2023-07-27 ENCOUNTER — EXTERNAL HOME HEALTH (OUTPATIENT)
Dept: HOME HEALTH SERVICES | Facility: HOSPITAL | Age: 63
End: 2023-07-27
Payer: MEDICARE

## 2023-07-28 LAB — FUNGUS SPEC CULT: NORMAL

## 2023-07-31 ENCOUNTER — DOCUMENT SCAN (OUTPATIENT)
Dept: HOME HEALTH SERVICES | Facility: HOSPITAL | Age: 63
End: 2023-07-31
Payer: MEDICARE

## 2023-07-31 DIAGNOSIS — Z98.890 S/P SPINAL SURGERY: ICD-10-CM

## 2023-07-31 RX ORDER — OXYCODONE AND ACETAMINOPHEN 5; 325 MG/1; MG/1
1 TABLET ORAL EVERY 8 HOURS PRN
Qty: 21 TABLET | Refills: 0 | Status: SHIPPED | OUTPATIENT
Start: 2023-07-31 | End: 2023-08-03 | Stop reason: SDUPTHER

## 2023-08-01 ENCOUNTER — TELEPHONE (OUTPATIENT)
Dept: HEPATOLOGY | Facility: CLINIC | Age: 63
End: 2023-08-01
Payer: MEDICARE

## 2023-08-01 NOTE — TELEPHONE ENCOUNTER
Patient called to reschedule appointment.  Provider has a schedule change.      New appointment date Wednesday, September 13, 2023 at 1:30PM.      A copy of the appointment has been mailed out.

## 2023-08-03 ENCOUNTER — PATIENT MESSAGE (OUTPATIENT)
Dept: ORTHOPEDICS | Facility: CLINIC | Age: 63
End: 2023-08-03
Payer: MEDICARE

## 2023-08-03 ENCOUNTER — SPECIALTY PHARMACY (OUTPATIENT)
Dept: PHARMACY | Facility: CLINIC | Age: 63
End: 2023-08-03
Payer: MEDICARE

## 2023-08-03 DIAGNOSIS — Z98.890 S/P SPINAL SURGERY: ICD-10-CM

## 2023-08-03 DIAGNOSIS — K31.84 GASTROPARESIS: Primary | ICD-10-CM

## 2023-08-03 RX ORDER — OXYCODONE AND ACETAMINOPHEN 5; 325 MG/1; MG/1
1 TABLET ORAL EVERY 6 HOURS PRN
Qty: 28 TABLET | Refills: 0 | Status: SHIPPED | OUTPATIENT
Start: 2023-08-03 | End: 2023-08-07 | Stop reason: SDUPTHER

## 2023-08-03 NOTE — TELEPHONE ENCOUNTER
Outgoing call to pt regarding kevzara refill. Pt has one dose on hand for 8/7. Will follow up for refill.

## 2023-08-06 ENCOUNTER — PATIENT MESSAGE (OUTPATIENT)
Dept: ORTHOPEDICS | Facility: CLINIC | Age: 63
End: 2023-08-06
Payer: MEDICARE

## 2023-08-06 DIAGNOSIS — Z98.890 S/P SPINAL SURGERY: ICD-10-CM

## 2023-08-07 RX ORDER — OXYCODONE AND ACETAMINOPHEN 5; 325 MG/1; MG/1
1 TABLET ORAL EVERY 4 HOURS PRN
Qty: 42 TABLET | Refills: 0 | Status: SHIPPED | OUTPATIENT
Start: 2023-08-07 | End: 2023-08-15 | Stop reason: SDUPTHER

## 2023-08-07 NOTE — TELEPHONE ENCOUNTER
Outgoing call regarding kevzara refill; per pt, she has a pen on hand for today, but will need more for 8/21; informed her that [facility] will follow up on 8/14 to schedule delivery

## 2023-08-08 ENCOUNTER — TELEPHONE (OUTPATIENT)
Dept: RHEUMATOLOGY | Facility: CLINIC | Age: 63
End: 2023-08-08
Payer: MEDICARE

## 2023-08-08 DIAGNOSIS — E83.51 HYPOCALCEMIA: Primary | ICD-10-CM

## 2023-08-08 NOTE — TELEPHONE ENCOUNTER
Please schedule DXA which  in Endocrinology ordered to be done in March. Please add my name as well to the order so I get a copy. Also have patient increase Citracal with D to 2 tabs twice daily and schedule repeat calcium in 4 wks. Thank you MEGA

## 2023-08-09 ENCOUNTER — TELEPHONE (OUTPATIENT)
Dept: OPTOMETRY | Facility: CLINIC | Age: 63
End: 2023-08-09
Payer: MEDICARE

## 2023-08-09 NOTE — TELEPHONE ENCOUNTER
Fwd message to Dr. Jurado staff since Dr. Armas have not seen her for dry eye problems in years.

## 2023-08-09 NOTE — TELEPHONE ENCOUNTER
"----- Message from Zeny Prkaash sent at 7/26/2023 11:41 AM CDT -----    ----- Message -----  From: Luis Wheeler  Sent: 7/26/2023  11:37 AM CDT  To: Pawel BASSETT Staff    RX Name and Strength:  lifitegrast (XIIDRA) 5 % Dpet           How is the patient currently taking it?  INSTILL ONE DROP INTO BOTH EYES TWICE A DAY        Is this a 30 day or 90 day Rx?  60 mL         Preferred Pharmacy with phone number:    [facility]  [address]  [place] [address]  Phone: [phone] Fax: [phone]        Local or Mail Order: Local        Ordering Provider: Pawel        Contact Preference: [phone]             Additional Information:  "Thank you for all that you do for our patients"       "

## 2023-08-10 ENCOUNTER — PATIENT MESSAGE (OUTPATIENT)
Dept: RHEUMATOLOGY | Facility: CLINIC | Age: 63
End: 2023-08-10
Payer: MEDICARE

## 2023-08-14 NOTE — TELEPHONE ENCOUNTER
Specialty Pharmacy - Refill Coordination    Specialty Medication Orders Linked to Encounter      Flowsheet Row Most Recent Value   Medication #1 sarilumab (KEVZARA) 200 mg/1.14 mL Syrg (Order#[id number], Rx#[id number])          Pt denied any missed doses  Refill Questions - Documented Responses      Flowsheet Row Most Recent Value   Patient Availability and HIPAA Verification    Does patient want to proceed with activity? Yes   HIPAA/medical authority confirmed? Yes   Relationship to patient of person spoken to? Self   Refill Screening Questions    Changes to allergies? No   Changes to medications? No   New conditions since last clinic visit? No   Unplanned office visit, urgent care, ED, or hospital admission in the last 4 weeks? No   How does patient/caregiver feel medication is working? Good   Financial problems or insurance changes? No   How many doses of your specialty medications were missed in the last 4 weeks? 0   Would patient like to speak to a pharmacist? No   When does the patient need to receive the medication? 08/21/23   Refill Delivery Questions    How will the patient receive the medication? MEDRx   When does the patient need to receive the medication? 08/21/23   Shipping Address Home   Address in [facility] confirmed and updated if neccessary? Yes   Expected Copay ($) 218.85   Is the patient able to afford the medication copay? Yes   Payment Method CC on file   Days supply of Refill 28   Supplies needed? No supplies needed   Refill activity completed? Yes   Refill activity plan Refill scheduled   Shipment/Pickup Date: 08/14/23            Current Outpatient Medications   Medication Sig    acetaminophen (TYLENOL) 500 MG tablet Take 2 tablets (1,000 mg total) by mouth every 8 (eight) hours.    albuterol (PROVENTIL/VENTOLIN HFA) 90 mcg/actuation inhaler Inhale 2 puffs into the lungs every 6 (six) hours as needed. Rescue    albuterol-ipratropium (DUO-NEB) 2.5 mg-0.5 mg/3 mL nebulizer solution Take  3 mLs by nebulization every 6 (six) hours as needed for Wheezing. Rescue    aspirin 325 MG tablet Take 1 tablet (325 mg total) by mouth once daily.    budesonide-formoterol 160-4.5 mcg (SYMBICORT) 160-4.5 mcg/actuation HFAA Inhale 2 puffs into the lungs every 12 (twelve) hours.    calcium citrate-vitamin D3 315-200 mg (CITRACAL+D) 315 mg-5 mcg (200 unit) per tablet Take 1 tablet by mouth 2 (two) times daily.     celecoxib (CELEBREX) 200 MG capsule Take 1 capsule (200 mg total) by mouth once daily.    cycloSPORINE (RESTASIS) 0.05 % ophthalmic emulsion PLACE 1 DROP INTO BOTH EYES TWICE A DAY    diclofenac sodium (VOLTAREN) 1 % Gel APPLY 2 GRAMS TOPICALLY 4 (FOUR) TIMES DAILY AS NEEDED.    fluconazole (DIFLUCAN) 150 MG Tab Take 150 mg by mouth as needed.    furosemide (LASIX) 20 MG tablet Take 1 tablet (20 mg total) by mouth once daily.    halobetasol propion-tazarotene (DUOBRII) 0.01-0.045 % Lotn aaa on feet and hands qhs  then vaseline and warm towel    INV PROPULSID 10 MG Take 2 tablets (20 mg) by mouth 4 (four) times daily. FOR INVESTIGATIONAL USE ONLY. Protocol [id number]  Subject ID: [id number].    ketoconazole (NIZORAL) 2 % cream Apply to feet twice daily for 3 weeks    leflunomide (ARAVA) 20 MG Tab TAKE 1 TABLET BY MOUTH EVERY DAY    lifitegrast (XIIDRA) 5 % Dpet INSTILL ONE DROP INTO BOTH EYES TWICE A DAY    magic mouthwash diphen/antac/lidoc Swish and Spit 5 mL by mouth for 30 seconds twice daily.    methenamine (HIPREX) 1 gram Tab Take 1 tablet (1 g total) by mouth 2 (two) times daily.    mirabegron (MYRBETRIQ) 25 mg Tb24 ER tablet Take 1 tablet (25 mg total) by mouth once daily.    montelukast (SINGULAIR) 10 mg tablet Take 1 tablet (10 mg total) by mouth every evening.    naproxen (NAPROSYN) 500 MG tablet Take 1 tablet (500 mg total) by mouth 2 (two) times daily as needed (prn).    omeprazole (PRILOSEC) 40 MG capsule Take 1 capsule (40 mg total) by mouth every morning.    omeprazole-sodium bicarbonate  (ZEGERID) 40-1.1 mg-gram per capsule TAKE 1 CAPSULE BY MOUTH EVERY DAY IN THE MORNING    oxyCODONE-acetaminophen (PERCOCET) 5-325 mg per tablet Take 1 tablet by mouth every 4 (four) hours as needed for Pain.    potassium chloride SA (K-DUR,KLOR-CON) 20 MEQ tablet Take 1 tablet (20 mEq total) by mouth once daily. Take with lasix    POTASSIUM ORAL Take by mouth once daily.    pregabalin (LYRICA) 150 MG capsule Take 1 capsule (150 mg total) by mouth 3 (three) times daily.    PREMARIN vaginal cream PLACE 0.5 GRAM VAGINALLY 3 (THREE) TIMES A WEEK.    promethazine (PHENERGAN) 25 MG tablet Take 1 tablet (25 mg total) by mouth every 6 (six) hours as needed for Nausea.    promethazine (PHENERGAN) 25 MG tablet Take 1 tablet (25 mg total) by mouth every 4 (four) hours as needed for Nausea.    rosuvastatin (CRESTOR) 20 MG tablet Take 1 tablet (20 mg total) by mouth every evening.    sarilumab (KEVZARA) 200 mg/1.14 mL Syrg Inject 1.14 mL (200 mg total) into the skin every 14 (fourteen) days.    sucralfate (CARAFATE) 1 gram tablet TAKE 1 TABLET (1 G TOTAL) BY MOUTH 4 (FOUR) TIMES DAILY.    sucralfate (CARAFATE) 1 gram tablet Take 1 tablet (1 g total) by mouth 4 (four) times daily.   Last reviewed on 7/24/2023  8:08 AM by Krystal Ross PA-C    Review of patient's allergies indicates:   Allergen Reactions    Actemra [tocilizumab] Other (See Comments)     Severe dizziness    Codeine Nausea And Vomiting and Hives    Gold au 198 Hives and Rash    Hydroxychloroquine Other (See Comments)     Can't remember the reaction      Iodinated contrast media Other (See Comments)     Other reaction(s): BURNING ALL OVER    Iodine Other (See Comments) and Hives     Other reaction(s): BURNING ALL OVER - Iodine dye - Not topical    Ondansetron Nausea And Vomiting    Sulfa (sulfonamide antibiotics) Other (See Comments)     Can't remember the reaction    Zofran [ondansetron hcl (pf)] Nausea And Vomiting     Pt reports that last time she received  "zofran she started vomiting again    Methotrexate analogues Nausea Only    Pneumococcal vaccine Other (See Comments)    Pneumovax 23 [pneumococcal 23-argenis ps vaccine] Other (See Comments)     "sick"    Methotrexate Nausea Only    Last reviewed on  8/7/2023 6:55 AM by Krystal Ross      Tasks added this encounter   No tasks added.   Tasks due within next 3 months   No tasks due.     Aletha Guadalupe, PharmD  [facility]  [address] [place], [place] [address]  [facility] [address]  Phone: [phone]  Fax: [phone]        "

## 2023-08-15 ENCOUNTER — PATIENT MESSAGE (OUTPATIENT)
Dept: ORTHOPEDICS | Facility: CLINIC | Age: 63
End: 2023-08-15
Payer: MEDICARE

## 2023-08-15 ENCOUNTER — PATIENT MESSAGE (OUTPATIENT)
Dept: UROLOGY | Facility: CLINIC | Age: 63
End: 2023-08-15
Payer: MEDICARE

## 2023-08-15 DIAGNOSIS — M19.90 OSTEOARTHRITIS, UNSPECIFIED OSTEOARTHRITIS TYPE, UNSPECIFIED SITE: ICD-10-CM

## 2023-08-15 DIAGNOSIS — N39.41 URGE URINARY INCONTINENCE: ICD-10-CM

## 2023-08-15 DIAGNOSIS — I25.10 CORONARY ARTERY DISEASE INVOLVING NATIVE CORONARY ARTERY OF NATIVE HEART WITHOUT ANGINA PECTORIS: Chronic | ICD-10-CM

## 2023-08-15 DIAGNOSIS — Z98.890 S/P SPINAL SURGERY: ICD-10-CM

## 2023-08-15 DIAGNOSIS — R39.15 URINARY URGENCY: ICD-10-CM

## 2023-08-15 RX ORDER — ROSUVASTATIN CALCIUM 20 MG/1
20 TABLET, COATED ORAL NIGHTLY
Qty: 90 TABLET | Refills: 3 | Status: SHIPPED | OUTPATIENT
Start: 2023-08-15 | End: 2023-11-07 | Stop reason: SDUPTHER

## 2023-08-15 RX ORDER — OXYCODONE AND ACETAMINOPHEN 5; 325 MG/1; MG/1
1 TABLET ORAL EVERY 4 HOURS PRN
Qty: 42 TABLET | Refills: 0 | Status: SHIPPED | OUTPATIENT
Start: 2023-08-15 | End: 2023-08-29 | Stop reason: SDUPTHER

## 2023-08-15 RX ORDER — METHOCARBAMOL 750 MG/1
750 TABLET, FILM COATED ORAL 3 TIMES DAILY
Qty: 90 TABLET | Refills: 0 | OUTPATIENT
Start: 2023-08-15 | End: 2023-09-14

## 2023-08-15 RX ORDER — DICLOFENAC SODIUM 10 MG/G
GEL TOPICAL
Qty: 300 G | Refills: 2 | Status: SHIPPED | OUTPATIENT
Start: 2023-08-15 | End: 2023-11-20

## 2023-08-16 ENCOUNTER — PATIENT MESSAGE (OUTPATIENT)
Dept: ORTHOPEDICS | Facility: CLINIC | Age: 63
End: 2023-08-16
Payer: MEDICARE

## 2023-08-16 ENCOUNTER — PATIENT MESSAGE (OUTPATIENT)
Dept: RHEUMATOLOGY | Facility: CLINIC | Age: 63
End: 2023-08-16
Payer: MEDICARE

## 2023-08-16 RX ORDER — METHOCARBAMOL 750 MG/1
750 TABLET, FILM COATED ORAL 3 TIMES DAILY PRN
Qty: 90 TABLET | Refills: 1 | Status: SHIPPED | OUTPATIENT
Start: 2023-08-16 | End: 2023-10-20 | Stop reason: SDUPTHER

## 2023-08-17 ENCOUNTER — OFFICE VISIT (OUTPATIENT)
Dept: OTOLARYNGOLOGY | Facility: CLINIC | Age: 63
End: 2023-08-17
Payer: MEDICARE

## 2023-08-17 VITALS
BODY MASS INDEX: 25.66 KG/M2 | DIASTOLIC BLOOD PRESSURE: 69 MMHG | WEIGHT: 135.81 LBS | HEART RATE: 89 BPM | SYSTOLIC BLOOD PRESSURE: 112 MMHG

## 2023-08-17 DIAGNOSIS — J01.00 ACUTE NON-RECURRENT MAXILLARY SINUSITIS: ICD-10-CM

## 2023-08-17 DIAGNOSIS — J31.0 CHRONIC RHINITIS: Chronic | ICD-10-CM

## 2023-08-17 DIAGNOSIS — J34.89 NASAL VESTIBULITIS: ICD-10-CM

## 2023-08-17 DIAGNOSIS — M51.36 DDD (DEGENERATIVE DISC DISEASE), LUMBAR: Primary | ICD-10-CM

## 2023-08-17 DIAGNOSIS — H60.63 CHRONIC OTITIS EXTERNA OF BOTH EARS, UNSPECIFIED TYPE: Chronic | ICD-10-CM

## 2023-08-17 DIAGNOSIS — M25.551 RIGHT HIP PAIN: ICD-10-CM

## 2023-08-17 DIAGNOSIS — H61.91 LESION OF EXTERNAL EAR CANAL, RIGHT: Primary | Chronic | ICD-10-CM

## 2023-08-17 PROCEDURE — 99214 PR OFFICE/OUTPT VISIT, EST, LEVL IV, 30-39 MIN: ICD-10-PCS | Mod: 25,S$GLB,, | Performed by: OTOLARYNGOLOGY

## 2023-08-17 PROCEDURE — 3008F PR BODY MASS INDEX (BMI) DOCUMENTED: ICD-10-PCS | Mod: CPTII,S$GLB,, | Performed by: OTOLARYNGOLOGY

## 2023-08-17 PROCEDURE — 97597 DEBRIDEMENT: ICD-10-PCS | Mod: S$GLB,,, | Performed by: OTOLARYNGOLOGY

## 2023-08-17 PROCEDURE — 3008F BODY MASS INDEX DOCD: CPT | Mod: CPTII,S$GLB,, | Performed by: OTOLARYNGOLOGY

## 2023-08-17 PROCEDURE — 1160F PR REVIEW ALL MEDS BY PRESCRIBER/CLIN PHARMACIST DOCUMENTED: ICD-10-PCS | Mod: CPTII,S$GLB,, | Performed by: OTOLARYNGOLOGY

## 2023-08-17 PROCEDURE — 99999 PR PBB SHADOW E&M-EST. PATIENT-LVL IV: ICD-10-PCS | Mod: PBBFAC,,, | Performed by: OTOLARYNGOLOGY

## 2023-08-17 PROCEDURE — 99214 OFFICE O/P EST MOD 30 MIN: CPT | Mod: 25,S$GLB,, | Performed by: OTOLARYNGOLOGY

## 2023-08-17 PROCEDURE — 3078F DIAST BP <80 MM HG: CPT | Mod: CPTII,S$GLB,, | Performed by: OTOLARYNGOLOGY

## 2023-08-17 PROCEDURE — 1159F PR MEDICATION LIST DOCUMENTED IN MEDICAL RECORD: ICD-10-PCS | Mod: CPTII,S$GLB,, | Performed by: OTOLARYNGOLOGY

## 2023-08-17 PROCEDURE — 3074F PR MOST RECENT SYSTOLIC BLOOD PRESSURE < 130 MM HG: ICD-10-PCS | Mod: CPTII,S$GLB,, | Performed by: OTOLARYNGOLOGY

## 2023-08-17 PROCEDURE — 99999 PR PBB SHADOW E&M-EST. PATIENT-LVL IV: CPT | Mod: PBBFAC,,, | Performed by: OTOLARYNGOLOGY

## 2023-08-17 PROCEDURE — 1160F RVW MEDS BY RX/DR IN RCRD: CPT | Mod: CPTII,S$GLB,, | Performed by: OTOLARYNGOLOGY

## 2023-08-17 PROCEDURE — 3074F SYST BP LT 130 MM HG: CPT | Mod: CPTII,S$GLB,, | Performed by: OTOLARYNGOLOGY

## 2023-08-17 PROCEDURE — 1159F MED LIST DOCD IN RCRD: CPT | Mod: CPTII,S$GLB,, | Performed by: OTOLARYNGOLOGY

## 2023-08-17 PROCEDURE — 87070 CULTURE OTHR SPECIMN AEROBIC: CPT | Performed by: OTOLARYNGOLOGY

## 2023-08-17 PROCEDURE — 87102 FUNGUS ISOLATION CULTURE: CPT | Performed by: OTOLARYNGOLOGY

## 2023-08-17 PROCEDURE — 87075 CULTR BACTERIA EXCEPT BLOOD: CPT | Performed by: OTOLARYNGOLOGY

## 2023-08-17 PROCEDURE — 97597 DBRDMT OPN WND 1ST 20 CM/<: CPT | Mod: S$GLB,,, | Performed by: OTOLARYNGOLOGY

## 2023-08-17 PROCEDURE — 3078F PR MOST RECENT DIASTOLIC BLOOD PRESSURE < 80 MM HG: ICD-10-PCS | Mod: CPTII,S$GLB,, | Performed by: OTOLARYNGOLOGY

## 2023-08-17 RX ORDER — DOXYCYCLINE HYCLATE 100 MG
100 TABLET ORAL 2 TIMES DAILY
Qty: 28 TABLET | Refills: 1 | Status: SHIPPED | OUTPATIENT
Start: 2023-08-17 | End: 2023-10-12

## 2023-08-17 RX ORDER — MIRABEGRON 25 MG/1
25 TABLET, FILM COATED, EXTENDED RELEASE ORAL DAILY
Qty: 90 TABLET | Refills: 3 | Status: SHIPPED | OUTPATIENT
Start: 2023-08-17 | End: 2024-08-16

## 2023-08-17 NOTE — PATIENT INSTRUCTIONS
Use mupirocin ointment in right ear canal as directed twice daily.    If nasal crusting/irritation returns, use mupirocin in the nose twice daily.      Start doxycycline for acute sinusitis.  Use Diflucan if needed for oral or esophageal thrush.  Use saline nasal spray OTC p.r.n..    Will follow-up in 6-8 weeks to reassess right ear wound.  Will follow-up culture results and make changes to antibiotic or topical regimen if needed.

## 2023-08-21 ENCOUNTER — TELEPHONE (OUTPATIENT)
Dept: OTOLARYNGOLOGY | Facility: CLINIC | Age: 63
End: 2023-08-21
Payer: MEDICARE

## 2023-08-21 LAB — BACTERIA SPEC AEROBE CULT: NORMAL

## 2023-08-21 NOTE — PROGRESS NOTES
Labs reviewed and no growth so far on ear cultures.  Will continue to follow.  Continue current medications.     Love Whaley MD  [facility]

## 2023-08-21 NOTE — TELEPHONE ENCOUNTER
Informed  of her lab results as follow:    Labs reviewed and no growth so far on ear cultures.  Will continue to follow.   Continue current medications.     Emily L. Burke, MD [facility]  ----- Message from Love Whaley MD sent at 8/21/2023  4:13 PM CDT -----  Labs reviewed and no growth so far on ear cultures.  Will continue to follow.  Continue current medications.     Emily L. Burke, MD [facility]

## 2023-08-22 ENCOUNTER — LAB VISIT (OUTPATIENT)
Dept: LAB | Facility: HOSPITAL | Age: 63
End: 2023-08-22
Attending: INTERNAL MEDICINE
Payer: MEDICARE

## 2023-08-22 ENCOUNTER — OFFICE VISIT (OUTPATIENT)
Dept: GASTROENTEROLOGY | Facility: CLINIC | Age: 63
End: 2023-08-22
Payer: MEDICARE

## 2023-08-22 ENCOUNTER — HOSPITAL ENCOUNTER (OUTPATIENT)
Dept: CARDIOLOGY | Facility: CLINIC | Age: 63
Discharge: HOME | End: 2023-08-22
Payer: MEDICARE

## 2023-08-22 VITALS
WEIGHT: 133.63 LBS | SYSTOLIC BLOOD PRESSURE: 119 MMHG | DIASTOLIC BLOOD PRESSURE: 72 MMHG | HEART RATE: 95 BPM | HEIGHT: 61 IN | BODY MASS INDEX: 25.23 KG/M2

## 2023-08-22 DIAGNOSIS — K31.84 GASTROPARESIS: ICD-10-CM

## 2023-08-22 DIAGNOSIS — K31.84 GASTROPARESIS: Primary | ICD-10-CM

## 2023-08-22 DIAGNOSIS — K21.9 GASTROESOPHAGEAL REFLUX DISEASE WITHOUT ESOPHAGITIS: ICD-10-CM

## 2023-08-22 LAB
ALBUMIN SERPL BCP-MCNC: 3.8 G/DL (ref 3.5–5.2)
ALP SERPL-CCNC: 132 U/L (ref 55–135)
ALT SERPL W/O P-5'-P-CCNC: 36 U/L (ref 10–44)
ANION GAP SERPL CALC-SCNC: 9 MMOL/L (ref 8–16)
AST SERPL-CCNC: 44 U/L (ref 10–40)
BACTERIA SPEC ANAEROBE CULT: NORMAL
BASOPHILS # BLD AUTO: 0.06 K/UL (ref 0–0.2)
BASOPHILS NFR BLD: 1.1 % (ref 0–1.9)
BILIRUB SERPL-MCNC: 0.3 MG/DL (ref 0.1–1)
BUN SERPL-MCNC: 12 MG/DL (ref 8–23)
CALCIUM SERPL-MCNC: 9.3 MG/DL (ref 8.7–10.5)
CHLORIDE SERPL-SCNC: 106 MMOL/L (ref 95–110)
CO2 SERPL-SCNC: 24 MMOL/L (ref 23–29)
CREAT SERPL-MCNC: 0.8 MG/DL (ref 0.5–1.4)
DIFFERENTIAL METHOD: ABNORMAL
EOSINOPHIL # BLD AUTO: 0 K/UL (ref 0–0.5)
EOSINOPHIL NFR BLD: 0.6 % (ref 0–8)
ERYTHROCYTE [DISTWIDTH] IN BLOOD BY AUTOMATED COUNT: 16.3 % (ref 11.5–14.5)
EST. GFR  (NO RACE VARIABLE): >60 ML/MIN/1.73 M^2
GLUCOSE SERPL-MCNC: 111 MG/DL (ref 70–110)
HCT VFR BLD AUTO: 35.1 % (ref 37–48.5)
HGB BLD-MCNC: 11 G/DL (ref 12–16)
IMM GRANULOCYTES # BLD AUTO: 0.01 K/UL (ref 0–0.04)
IMM GRANULOCYTES NFR BLD AUTO: 0.2 % (ref 0–0.5)
LYMPHOCYTES # BLD AUTO: 0.7 K/UL (ref 1–4.8)
LYMPHOCYTES NFR BLD: 12.8 % (ref 18–48)
MAGNESIUM SERPL-MCNC: 1.9 MG/DL (ref 1.6–2.6)
MCH RBC QN AUTO: 25.8 PG (ref 27–31)
MCHC RBC AUTO-ENTMCNC: 31.3 G/DL (ref 32–36)
MCV RBC AUTO: 82 FL (ref 82–98)
MONOCYTES # BLD AUTO: 0.4 K/UL (ref 0.3–1)
MONOCYTES NFR BLD: 7.1 % (ref 4–15)
NEUTROPHILS # BLD AUTO: 4.2 K/UL (ref 1.8–7.7)
NEUTROPHILS NFR BLD: 78.2 % (ref 38–73)
NRBC BLD-RTO: 0 /100 WBC
PHOSPHATE SERPL-MCNC: 3.9 MG/DL (ref 2.7–4.5)
PLATELET # BLD AUTO: 272 K/UL (ref 150–450)
PMV BLD AUTO: 10.6 FL (ref 9.2–12.9)
POTASSIUM SERPL-SCNC: 3.7 MMOL/L (ref 3.5–5.1)
PROT SERPL-MCNC: 6.1 G/DL (ref 6–8.4)
RBC # BLD AUTO: 4.27 M/UL (ref 4–5.4)
SODIUM SERPL-SCNC: 139 MMOL/L (ref 136–145)
WBC # BLD AUTO: 5.38 K/UL (ref 3.9–12.7)

## 2023-08-22 PROCEDURE — 93005 EKG 12-LEAD: ICD-10-PCS | Mod: S$GLB,,, | Performed by: INTERNAL MEDICINE

## 2023-08-22 PROCEDURE — 3008F PR BODY MASS INDEX (BMI) DOCUMENTED: ICD-10-PCS | Mod: CPTII,S$GLB,, | Performed by: INTERNAL MEDICINE

## 2023-08-22 PROCEDURE — 99999 PR PBB SHADOW E&M-EST. PATIENT-LVL IV: CPT | Mod: PBBFAC,,, | Performed by: INTERNAL MEDICINE

## 2023-08-22 PROCEDURE — 1159F PR MEDICATION LIST DOCUMENTED IN MEDICAL RECORD: ICD-10-PCS | Mod: CPTII,S$GLB,, | Performed by: INTERNAL MEDICINE

## 2023-08-22 PROCEDURE — 3074F SYST BP LT 130 MM HG: CPT | Mod: CPTII,S$GLB,, | Performed by: INTERNAL MEDICINE

## 2023-08-22 PROCEDURE — 85025 COMPLETE CBC W/AUTO DIFF WBC: CPT | Mod: Q1 | Performed by: INTERNAL MEDICINE

## 2023-08-22 PROCEDURE — 93010 EKG 12-LEAD: ICD-10-PCS | Mod: S$GLB,,, | Performed by: INTERNAL MEDICINE

## 2023-08-22 PROCEDURE — 3078F PR MOST RECENT DIASTOLIC BLOOD PRESSURE < 80 MM HG: ICD-10-PCS | Mod: CPTII,S$GLB,, | Performed by: INTERNAL MEDICINE

## 2023-08-22 PROCEDURE — 1159F MED LIST DOCD IN RCRD: CPT | Mod: CPTII,S$GLB,, | Performed by: INTERNAL MEDICINE

## 2023-08-22 PROCEDURE — 3078F DIAST BP <80 MM HG: CPT | Mod: CPTII,S$GLB,, | Performed by: INTERNAL MEDICINE

## 2023-08-22 PROCEDURE — 3074F PR MOST RECENT SYSTOLIC BLOOD PRESSURE < 130 MM HG: ICD-10-PCS | Mod: CPTII,S$GLB,, | Performed by: INTERNAL MEDICINE

## 2023-08-22 PROCEDURE — 99999 PR PBB SHADOW E&M-EST. PATIENT-LVL IV: ICD-10-PCS | Mod: PBBFAC,,, | Performed by: INTERNAL MEDICINE

## 2023-08-22 PROCEDURE — 80053 COMPREHEN METABOLIC PANEL: CPT | Performed by: INTERNAL MEDICINE

## 2023-08-22 PROCEDURE — 36415 COLL VENOUS BLD VENIPUNCTURE: CPT | Mod: Q1 | Performed by: INTERNAL MEDICINE

## 2023-08-22 PROCEDURE — 93010 ELECTROCARDIOGRAM REPORT: CPT | Mod: S$GLB,,, | Performed by: INTERNAL MEDICINE

## 2023-08-22 PROCEDURE — 83735 ASSAY OF MAGNESIUM: CPT | Performed by: INTERNAL MEDICINE

## 2023-08-22 PROCEDURE — 3008F BODY MASS INDEX DOCD: CPT | Mod: CPTII,S$GLB,, | Performed by: INTERNAL MEDICINE

## 2023-08-22 PROCEDURE — 99214 PR OFFICE/OUTPT VISIT, EST, LEVL IV, 30-39 MIN: ICD-10-PCS | Mod: S$GLB,,, | Performed by: INTERNAL MEDICINE

## 2023-08-22 PROCEDURE — 1160F RVW MEDS BY RX/DR IN RCRD: CPT | Mod: CPTII,S$GLB,, | Performed by: INTERNAL MEDICINE

## 2023-08-22 PROCEDURE — 1160F PR REVIEW ALL MEDS BY PRESCRIBER/CLIN PHARMACIST DOCUMENTED: ICD-10-PCS | Mod: CPTII,S$GLB,, | Performed by: INTERNAL MEDICINE

## 2023-08-22 PROCEDURE — 93005 ELECTROCARDIOGRAM TRACING: CPT | Mod: S$GLB,,, | Performed by: INTERNAL MEDICINE

## 2023-08-22 PROCEDURE — 84100 ASSAY OF PHOSPHORUS: CPT | Mod: Q1 | Performed by: INTERNAL MEDICINE

## 2023-08-22 PROCEDURE — 99214 OFFICE O/P EST MOD 30 MIN: CPT | Mod: S$GLB,,, | Performed by: INTERNAL MEDICINE

## 2023-08-22 NOTE — PROGRESS NOTES
Subjective:       Patient ID: Joyce Peterson is a 62 y.o. female.    Chief Complaint: Follow-up    Follow-up visit as part of the cisapride compassionate trial for gastroparesis.  Longstanding gastroparesis, idiopathic, main symptoms related to this include nausea and exacerbation of reflux symptoms.  She has been on the cisapride study and has done well she feels like it makes a notable improvement in her symptoms and that she has a significant increase in symptoms when she has to go off the cisapride for various reasons, usually for brief periods of time with excluded medications    Since her last visit with me she is had 2 surgeries.  Including 1 on the back.  And she is still on pain medicine for that.  This has aggravated the constipation.  It is also lead to some increase in heartburn as well.  She is handling the constipation with stool softeners right now.  She still is taking the Propulsid 20 mg before each meal.  This definitely helps with reducing reflux symptoms and reducing the fullness after eating.  No significant nausea or vomiting right now.  Does tend eat small meals.    Related to the cisapride she reports no new symptoms or potential side effects.  And she specifically says no palpitations or syncope.  Follow-up  Associated symptoms include arthralgias. Pertinent negatives include no chest pain, chills, congestion, coughing, diaphoresis, fatigue, fever, headaches, joint swelling, myalgias, neck pain, numbness, rash or sore throat.     Past Medical History:   Diagnosis Date    Acid reflux     Allergy     Anemia     Asthma     Coronary artery disease     CS (cervical spondylosis) 03/08/2013    Degenerative disc disease     Degenerative joint disease of hindfoot, left 6/28/2022    Dry eyes     Dry mouth     Gastroparesis     History of methotrexate therapy 01/19/2022    Hyperlipidemia     Lateral meniscus derangement 04/06/2016    Lobular carcinoma in situ     Lumbar spondylosis 03/08/2013     "Osteoarthritis     Osteoporosis     Pes planovalgus, acquired, left 6/28/2022    Rheumatoid arthritis(714.0)     Rupture of left triceps tendon 10/17/2018    Umbilical hernia 08/13/2015       Review of patient's allergies indicates:   Allergen Reactions    Actemra [tocilizumab] Other (See Comments)     Severe dizziness    Codeine Nausea And Vomiting and Hives    Gold au 198 Hives and Rash    Hydroxychloroquine Other (See Comments)     Can't remember the reaction      Iodinated contrast media Other (See Comments)     Other reaction(s): BURNING ALL OVER    Iodine Other (See Comments) and Hives     Other reaction(s): BURNING ALL OVER - Iodine dye - Not topical    Ondansetron Nausea And Vomiting    Sulfa (sulfonamide antibiotics) Other (See Comments)     Can't remember the reaction    Zofran [ondansetron hcl (pf)] Nausea And Vomiting     Pt reports that last time she received zofran she started vomiting again    Methotrexate analogues Nausea Only    Pneumococcal vaccine Other (See Comments)    Pneumovax 23 [pneumococcal 23-argenis ps vaccine] Other (See Comments)     "sick"    Methotrexate Nausea Only        Family History   Problem Relation Age of Onset    Cancer Mother         Lung Cancer    Emphysema Mother     Heart attack Mother     Alcohol abuse Mother     Cancer Father         Lung Cancer    Osteoarthritis Father     Lung cancer Father     Skin cancer Father     Alcohol abuse Father     Heart disease Brother     Heart attack Brother     Alcohol abuse Brother     Cirrhosis Brother     Osteoarthritis Paternal Aunt     Retinal detachment Maternal Aunt     No Known Problems Sister     No Known Problems Maternal Uncle     No Known Problems Paternal Uncle     No Known Problems Maternal Grandmother     No Known Problems Maternal Grandfather     No Known Problems Paternal Grandmother     No Known Problems Paternal Grandfather     Colon cancer Neg Hx     Esophageal cancer Neg Hx     Stomach cancer Neg Hx     Celiac disease " Neg Hx     Diabetes Neg Hx     Thyroid disease Neg Hx     Amblyopia Neg Hx     Blindness Neg Hx     Cataracts Neg Hx     Glaucoma Neg Hx     Hypertension Neg Hx     Macular degeneration Neg Hx     Strabismus Neg Hx     Stroke Neg Hx        Social History     Tobacco Use    Smoking status: Never    Smokeless tobacco: Never   Substance Use Topics    Alcohol use: Yes     Comment: 2-3 times per year.    Drug use: No        Review of Systems   Constitutional:  Negative for activity change, appetite change, chills, diaphoresis, fatigue, fever and unexpected weight change.   HENT:  Negative for nasal congestion, ear pain, mouth sores, nosebleeds, postnasal drip, rhinorrhea, sinus pressure/congestion, sore throat, trouble swallowing and voice change.    Eyes:  Negative for pain.   Respiratory:  Negative for cough, shortness of breath and wheezing.    Cardiovascular:  Negative for chest pain, palpitations and leg swelling.   Genitourinary:  Negative for difficulty urinating, dysuria, flank pain, hematuria and menstrual problem.   Musculoskeletal:  Positive for arthralgias, back pain and gait problem. Negative for joint swelling, myalgias and neck pain.   Integumentary:  Negative for rash.   Neurological:  Negative for dizziness, tremors, syncope, numbness and headaches.   Hematological:  Negative for adenopathy. Does not bruise/bleed easily.   Psychiatric/Behavioral:  Negative for agitation, behavioral problems, confusion, decreased concentration and dysphoric mood. The patient is not nervous/anxious.        CMP   Lab Results   Component Value Date     08/22/2023     (L) 07/25/2023     06/24/2023    K 3.7 08/22/2023    K 3.8 07/25/2023    K 5.0 06/24/2023     08/22/2023     07/25/2023     06/24/2023    CO2 24 08/22/2023    CO2 26 07/25/2023    CO2 30 (H) 06/24/2023     (H) 08/22/2023    GLU 87 07/25/2023     06/24/2023    BUN 12 08/22/2023    BUN 8 07/25/2023    BUN 6 (L)  06/24/2023    CREATININE 0.8 08/22/2023    CREATININE 0.57 07/25/2023    CREATININE 0.6 06/24/2023    CALCIUM 9.3 08/22/2023    CALCIUM 8.5 (L) 07/25/2023    CALCIUM 7.8 (L) 06/24/2023    PROT 6.1 08/22/2023    PROT 6.2 07/25/2023    PROT 4.7 (L) 06/22/2023    ALBUMIN 3.8 08/22/2023    ALBUMIN 3.8 07/25/2023    ALBUMIN 2.8 (L) 06/22/2023    BILITOT 0.3 08/22/2023    BILITOT 0.7 07/25/2023    BILITOT 0.2 06/22/2023    ALKPHOS 132 08/22/2023    ALKPHOS 179 (H) 07/25/2023    ALKPHOS 108 06/22/2023    AST 44 (H) 08/22/2023    AST 64 (H) 07/25/2023    AST 88 (H) 06/22/2023    ALT 36 08/22/2023    ALT 36 07/25/2023    ALT 65 (H) 06/22/2023    ANIONGAP 9 08/22/2023    ANIONGAP 7 (L) 07/25/2023    ANIONGAP 6 (L) 06/24/2023    and CBC   Lab Results   Component Value Date    WBC 5.38 08/22/2023    WBC 4.35 07/25/2023    WBC 5.44 06/24/2023    HGB 11.0 (L) 08/22/2023    HGB 11.5 (L) 07/25/2023    HGB 8.7 (L) 06/24/2023    HCT 35.1 (L) 08/22/2023     08/22/2023     07/25/2023     06/24/2023       No results found for this or any previous visit from the past 365 days.             Objective:      Physical Exam  Constitutional:       General: She is not in acute distress.     Appearance: She is well-developed.   HENT:      Head: Normocephalic and atraumatic.      Right Ear: External ear normal.      Left Ear: External ear normal.      Nose: Nose normal.      Mouth/Throat:      Pharynx: No oropharyngeal exudate.   Eyes:      General: No scleral icterus.     Conjunctiva/sclera: Conjunctivae normal.      Pupils: Pupils are equal, round, and reactive to light.   Neck:      Thyroid: No thyromegaly.   Cardiovascular:      Rate and Rhythm: Normal rate and regular rhythm.      Heart sounds: Normal heart sounds. No murmur heard.     No gallop.   Pulmonary:      Effort: Pulmonary effort is normal.      Breath sounds: Normal breath sounds. No wheezing or rales.   Abdominal:      General: Abdomen is flat. Bowel sounds are  normal. There is no distension. There are no signs of injury.      Palpations: Abdomen is soft. There is no shifting dullness, hepatomegaly or mass.      Tenderness: There is no abdominal tenderness.   Musculoskeletal:         General: No tenderness. Normal range of motion.      Cervical back: Normal range of motion and neck supple.   Lymphadenopathy:      Cervical: No cervical adenopathy.   Skin:     General: Skin is warm and dry.      Findings: No rash.   Neurological:      Mental Status: She is alert and oriented to person, place, and time.      Cranial Nerves: No cranial nerve deficit.   Psychiatric:         Behavior: Behavior normal.         Thought Content: Thought content normal.         Judgment: Judgment normal.         Assessment & Plan:       Gastroparesis    Gastroesophageal reflux disease without esophagitis     Assessment.  Gastroparesis and related gastroesophageal reflux.  We are treating this with the compassionate trial of cisapride.  For this appointment I was able to review lab work which includes the CBC and CMP done today.  I was also able to personally review the EKG done today and there is no change in the QT interval.  The lab work is acceptable for the study.  There been no changes of concern with regard to her participation in the study and she wants to continue being able to utilize cisapride.  She knows the importance of getting in touch with us with any changes in medication.    This note was created with voice recognition dictation technology.  There may be errors that I did not see, detect or correct.      López Moore MD

## 2023-08-22 NOTE — PROGRESS NOTES
"GENERAL GI PATIENT INTAKE:    COVID symptoms in the last 7 days (runny nose, sore throat, congestion, cough, fever): No  PCP: Krystal Singleton  If not PCP-  number given to establish [phone]: N/A    ALLERGIES REVIEWED:  Yes    CHIEF COMPLAINT:    Chief Complaint   Patient presents with    Follow-up       VITAL SIGNS:  /72   Pulse 95   Ht 5' 1" (1.549 m)   Wt 60.6 kg (133 lb 9.6 oz)   LMP  (LMP Unknown)   BMI 25.24 kg/m²      Change in medical, surgical, family or social history: No      REVIEWED MEDICATION LIST RECONCILED INCLUDING ABOVE MEDS:  Yes     "

## 2023-08-23 ENCOUNTER — OFFICE VISIT (OUTPATIENT)
Dept: OPTOMETRY | Facility: CLINIC | Age: 63
End: 2023-08-23
Payer: MEDICARE

## 2023-08-23 DIAGNOSIS — H25.13 NUCLEAR SCLEROSIS OF BOTH EYES: ICD-10-CM

## 2023-08-23 DIAGNOSIS — H10.13 ALLERGIC CONJUNCTIVITIS OF BOTH EYES: Primary | ICD-10-CM

## 2023-08-23 DIAGNOSIS — H04.123 BILATERAL DRY EYES: ICD-10-CM

## 2023-08-23 PROCEDURE — 1159F PR MEDICATION LIST DOCUMENTED IN MEDICAL RECORD: ICD-10-PCS | Mod: CPTII,S$GLB,, | Performed by: OPTOMETRIST

## 2023-08-23 PROCEDURE — 92012 INTRM OPH EXAM EST PATIENT: CPT | Mod: S$GLB,,, | Performed by: OPTOMETRIST

## 2023-08-23 PROCEDURE — 92012 PR EYE EXAM, EST PATIENT,INTERMED: ICD-10-PCS | Mod: S$GLB,,, | Performed by: OPTOMETRIST

## 2023-08-23 PROCEDURE — 99999 PR PBB SHADOW E&M-EST. PATIENT-LVL IV: ICD-10-PCS | Mod: PBBFAC,,, | Performed by: OPTOMETRIST

## 2023-08-23 PROCEDURE — 1159F MED LIST DOCD IN RCRD: CPT | Mod: CPTII,S$GLB,, | Performed by: OPTOMETRIST

## 2023-08-23 PROCEDURE — 99999 PR PBB SHADOW E&M-EST. PATIENT-LVL IV: CPT | Mod: PBBFAC,,, | Performed by: OPTOMETRIST

## 2023-08-23 NOTE — PATIENT INSTRUCTIONS
Apparent allergy-related symptoms of redness in both eyes, with swelling of lids,  She reports similar signs/symptoms in both eyes previously  Reviewed previous notes.  Signs/symptoms resolved satisfactorily with use of FML ophthalmic suspension in both eyes.     Issued sample of FML ophthalmic suspension to Mrs. Peterson, and suggest she initiate treatment in both eyes (one drop two times per day).  Apply cold compresses to both eyes as often as desired throughout the day.    Try Pataday ophthalmic solution.    Use one drop once or twice per day for relief of allergy-related symptoms    Issued Genteal artificial tears.  Use as often as desired in both eyes throughout the day.       Return in two to three weeks for recheck.  Will do general eye exam at that time, if deemed appropriate.  Mrs. Peterson will send message regarding appointment via [facility].     However, call/return sooner, if any apparent worsening of signs/symptoms in the interim.

## 2023-08-23 NOTE — PROGRESS NOTES
"HPI    Patient's age: 62 y.o. WF   Occupation: Disabled   Approximate date of last eye examination: 08/03/2022  Name of last eye doctor seen:    City/State: [facility]   Wears glasses? Yes      If yes, wears  Full-time or part-time?  full-time   Present g lasses are: Bifocal, SV Distance, SV Reading?  Progressive lens   Approximate age of present glasses: 1 yr     Any problem with VA with glasses?  Yes, reading is an issue. Allergies   are consistent and progressively getting worse.   Wears CLs?: No  Headaches?  Yes  Eye pain/discomfort? Eyes are dry                                                                                  Flashes?  No   Floaters?  No   Diplopia/Double vision?  No   Patient's Ocular History:          Any eye surgeries? No          Any eye injury?  No          Any treatment for eye disease?  No   Family history of eye disea se?  No   Significant patient medical history:         1. Diabetes?  no      If yes, IDDM or NIDDM?   n/a               2. HBP?  No               3. Other (describe):   rheumatoid arthritis    ! OTC eyedrops currently using:  refresh during the day, systane gel at   night         Serum tears (Dr. Hardy)  QID OU  "every time I pass the   refrigerator"            Restasis OU BID            Refresh ATs - all day OU, and gel drops at bedtime  (08/23/2023)           Xiidra BID OU (both Restasis and Xiidra, per Dr. Armas)    ! Any history of allergy/adverse reaction to any eye meds used   previously?  No      ! Any history of allergy/adverse reaction to eyedrops used during prior   eye exam(s)? no    ! Any history of allergy/a dverse reaction to Novacaine or similar meds?   no    ! Any history of allergy/adverse reaction to Epinephrine or similar meds?   no    ! Patient okay with use of anesthetic eyedrops to check eye pressure?    yes        ! Patient okay with use of eyedrops to dilate pupils today?  yes     !  Allergies/Medications/Medical History/Family History " reviewed today?    yes   Last edited by Leonie Back on 8/23/2023  1:32 PM.            Assessment /Plan     For exam results, see Encounter Report.    1. Allergic conjunctivitis of both eyes        2. Bilateral dry eyes        3. Nuclear sclerosis of both eyes                   Apparent allergy-related symptoms of redness in both eyes, with swelling of lids,  She reports similar signs/symptoms in both eyes previously  Reviewed previous notes.  Signs/symptoms resolved satisfactorily with use of FML ophthalmic suspension in both eyes.     Issued sample of FML ophthalmic suspension to Mrs. Peterson, and suggest she initiate treatment in both eyes (one drop two times per day).  Apply cold compresses to both eyes as often as desired throughout the day.    Try Pataday ophthalmic solution.    Use one drop once or twice per day for relief of allergy-related symptoms    Issued Genteal artificial tears.  Use as often as desired in both eyes throughout the day.       Return in two to three weeks for recheck.  Will do general eye exam at that time, if deemed appropriate.  Mrs. Peterson will send message regarding appointment via [facility].     However, call/return sooner, if any apparent worsening of signs/symptoms in the interim.

## 2023-08-28 ENCOUNTER — PATIENT MESSAGE (OUTPATIENT)
Dept: RHEUMATOLOGY | Facility: CLINIC | Age: 63
End: 2023-08-28
Payer: MEDICARE

## 2023-08-28 DIAGNOSIS — K31.84 GASTROPARESIS: Primary | ICD-10-CM

## 2023-08-28 DIAGNOSIS — Z00.6 PATIENT IN CLINICAL RESEARCH STUDY: ICD-10-CM

## 2023-08-29 ENCOUNTER — OFFICE VISIT (OUTPATIENT)
Dept: ORTHOPEDICS | Facility: CLINIC | Age: 63
End: 2023-08-29
Payer: MEDICARE

## 2023-08-29 ENCOUNTER — HOSPITAL ENCOUNTER (OUTPATIENT)
Dept: RADIOLOGY | Facility: HOSPITAL | Age: 63
Discharge: HOME | End: 2023-08-29
Attending: PHYSICIAN ASSISTANT
Payer: MEDICARE

## 2023-08-29 VITALS — WEIGHT: 134 LBS | HEIGHT: 61 IN | BODY MASS INDEX: 25.3 KG/M2

## 2023-08-29 DIAGNOSIS — M51.36 DDD (DEGENERATIVE DISC DISEASE), LUMBAR: Primary | ICD-10-CM

## 2023-08-29 DIAGNOSIS — M25.551 RIGHT HIP PAIN: ICD-10-CM

## 2023-08-29 DIAGNOSIS — M54.16 LUMBAR RADICULOPATHY: ICD-10-CM

## 2023-08-29 DIAGNOSIS — M51.36 DDD (DEGENERATIVE DISC DISEASE), LUMBAR: ICD-10-CM

## 2023-08-29 DIAGNOSIS — Z98.890 S/P SPINAL SURGERY: ICD-10-CM

## 2023-08-29 DIAGNOSIS — Z98.1 S/P LUMBAR FUSION: Primary | ICD-10-CM

## 2023-08-29 PROCEDURE — 73502 X-RAY EXAM HIP UNI 2-3 VIEWS: CPT | Mod: TC,RT

## 2023-08-29 PROCEDURE — 72110 X-RAY EXAM L-2 SPINE 4/>VWS: CPT | Mod: 26,,, | Performed by: RADIOLOGY

## 2023-08-29 PROCEDURE — 1159F MED LIST DOCD IN RCRD: CPT | Mod: CPTII,S$GLB,, | Performed by: PHYSICIAN ASSISTANT

## 2023-08-29 PROCEDURE — 99024 POSTOP FOLLOW-UP VISIT: CPT | Mod: S$GLB,,, | Performed by: PHYSICIAN ASSISTANT

## 2023-08-29 PROCEDURE — 72110 XR LUMBAR SPINE AP AND LAT WITH FLEX/EXT: ICD-10-PCS | Mod: 26,,, | Performed by: RADIOLOGY

## 2023-08-29 PROCEDURE — 99999 PR PBB SHADOW E&M-EST. PATIENT-LVL IV: ICD-10-PCS | Mod: PBBFAC,,, | Performed by: PHYSICIAN ASSISTANT

## 2023-08-29 PROCEDURE — 1159F PR MEDICATION LIST DOCUMENTED IN MEDICAL RECORD: ICD-10-PCS | Mod: CPTII,S$GLB,, | Performed by: PHYSICIAN ASSISTANT

## 2023-08-29 PROCEDURE — 99024 PR POST-OP FOLLOW-UP VISIT: ICD-10-PCS | Mod: S$GLB,,, | Performed by: PHYSICIAN ASSISTANT

## 2023-08-29 PROCEDURE — 3008F BODY MASS INDEX DOCD: CPT | Mod: CPTII,S$GLB,, | Performed by: PHYSICIAN ASSISTANT

## 2023-08-29 PROCEDURE — 99999 PR PBB SHADOW E&M-EST. PATIENT-LVL IV: CPT | Mod: PBBFAC,,, | Performed by: PHYSICIAN ASSISTANT

## 2023-08-29 PROCEDURE — 3008F PR BODY MASS INDEX (BMI) DOCUMENTED: ICD-10-PCS | Mod: CPTII,S$GLB,, | Performed by: PHYSICIAN ASSISTANT

## 2023-08-29 PROCEDURE — 73502 XR HIP WITH PELVIS WHEN PERFORMED, 2 OR 3  VIEWS RIGHT: ICD-10-PCS | Mod: 26,RT,, | Performed by: RADIOLOGY

## 2023-08-29 PROCEDURE — 72110 X-RAY EXAM L-2 SPINE 4/>VWS: CPT | Mod: TC

## 2023-08-29 PROCEDURE — 73502 X-RAY EXAM HIP UNI 2-3 VIEWS: CPT | Mod: 26,RT,, | Performed by: RADIOLOGY

## 2023-08-29 RX ORDER — OXYCODONE AND ACETAMINOPHEN 5; 325 MG/1; MG/1
1 TABLET ORAL EVERY 4 HOURS PRN
Qty: 42 TABLET | Refills: 0 | Status: SHIPPED | OUTPATIENT
Start: 2023-08-29 | End: 2023-09-11 | Stop reason: SDUPTHER

## 2023-08-29 NOTE — PROGRESS NOTES
Date: 08/29/2023    Supervising Physician: Jh Mosqueda M.D.    Date of Surgery: 6/22/2023    Procedure: L4/5 TLIF    History: Joyce Peterson is seen today for follow-up following the above listed procedure. Overall the patient is doing well but today notes the pain she was having in her leg is improved but she is having some low back and right groin pain.  The pain is making it difficult to walk.  She is frustrated.      Exam:  Incision is healing well.   There is no sign of infection. Neuro exam is stable. No signs of DVT.    Radiographs: imaging today shows hardware in place, no evidence of failure.     Assessment/Plan: 10 weeks post op.    I would like to get an MADELINE. She will follow up with me 2 weeks after injection if symptoms persist.         Thank you for the opportunity to participate in this patient's care. Please give me a call if there are any concerns or questions.

## 2023-08-30 ENCOUNTER — PATIENT MESSAGE (OUTPATIENT)
Dept: ORTHOPEDICS | Facility: CLINIC | Age: 63
End: 2023-08-30
Payer: MEDICARE

## 2023-08-30 ENCOUNTER — PATIENT MESSAGE (OUTPATIENT)
Dept: PAIN MEDICINE | Facility: OTHER | Age: 63
End: 2023-08-30
Payer: MEDICARE

## 2023-08-30 DIAGNOSIS — M54.16 LUMBAR RADICULOPATHY: Primary | ICD-10-CM

## 2023-08-31 RX ORDER — NAPROXEN 500 MG/1
500 TABLET ORAL 2 TIMES DAILY PRN
Qty: 180 TABLET | Refills: 1 | Status: SHIPPED | OUTPATIENT
Start: 2023-08-31 | End: 2024-01-31

## 2023-08-31 RX ORDER — PREDNISONE 5 MG/1
TABLET ORAL
Qty: 90 TABLET | Refills: 1 | Status: SHIPPED | OUTPATIENT
Start: 2023-08-31

## 2023-09-05 ENCOUNTER — LAB VISIT (OUTPATIENT)
Dept: LAB | Facility: HOSPITAL | Age: 63
End: 2023-09-05
Payer: MEDICARE

## 2023-09-05 ENCOUNTER — OFFICE VISIT (OUTPATIENT)
Dept: HEMATOLOGY/ONCOLOGY | Facility: CLINIC | Age: 63
End: 2023-09-05
Payer: MEDICARE

## 2023-09-05 ENCOUNTER — PATIENT MESSAGE (OUTPATIENT)
Dept: RHEUMATOLOGY | Facility: CLINIC | Age: 63
End: 2023-09-05
Payer: MEDICARE

## 2023-09-05 VITALS
TEMPERATURE: 98 F | SYSTOLIC BLOOD PRESSURE: 116 MMHG | HEART RATE: 85 BPM | WEIGHT: 133.5 LBS | DIASTOLIC BLOOD PRESSURE: 74 MMHG | BODY MASS INDEX: 25.2 KG/M2 | HEIGHT: 61 IN | OXYGEN SATURATION: 96 % | RESPIRATION RATE: 16 BRPM

## 2023-09-05 DIAGNOSIS — D50.9 IRON DEFICIENCY ANEMIA, UNSPECIFIED IRON DEFICIENCY ANEMIA TYPE: Primary | ICD-10-CM

## 2023-09-05 DIAGNOSIS — D50.9 IRON DEFICIENCY ANEMIA, UNSPECIFIED IRON DEFICIENCY ANEMIA TYPE: ICD-10-CM

## 2023-09-05 LAB
FERRITIN SERPL-MCNC: 171 NG/ML (ref 20–300)
IRON SERPL-MCNC: 89 UG/DL (ref 30–160)
SATURATED IRON: 23 % (ref 20–50)
TOTAL IRON BINDING CAPACITY: 388 UG/DL (ref 250–450)
TRANSFERRIN SERPL-MCNC: 262 MG/DL (ref 200–375)

## 2023-09-05 PROCEDURE — 3074F SYST BP LT 130 MM HG: CPT | Mod: CPTII,S$GLB,, | Performed by: INTERNAL MEDICINE

## 2023-09-05 PROCEDURE — 36415 COLL VENOUS BLD VENIPUNCTURE: CPT | Performed by: INTERNAL MEDICINE

## 2023-09-05 PROCEDURE — 82728 ASSAY OF FERRITIN: CPT | Performed by: INTERNAL MEDICINE

## 2023-09-05 PROCEDURE — 99999 PR PBB SHADOW E&M-EST. PATIENT-LVL IV: ICD-10-PCS | Mod: PBBFAC,,, | Performed by: INTERNAL MEDICINE

## 2023-09-05 PROCEDURE — 99214 PR OFFICE/OUTPT VISIT, EST, LEVL IV, 30-39 MIN: ICD-10-PCS | Mod: S$GLB,,, | Performed by: INTERNAL MEDICINE

## 2023-09-05 PROCEDURE — 3074F PR MOST RECENT SYSTOLIC BLOOD PRESSURE < 130 MM HG: ICD-10-PCS | Mod: CPTII,S$GLB,, | Performed by: INTERNAL MEDICINE

## 2023-09-05 PROCEDURE — 3078F PR MOST RECENT DIASTOLIC BLOOD PRESSURE < 80 MM HG: ICD-10-PCS | Mod: CPTII,S$GLB,, | Performed by: INTERNAL MEDICINE

## 2023-09-05 PROCEDURE — 1159F MED LIST DOCD IN RCRD: CPT | Mod: CPTII,S$GLB,, | Performed by: INTERNAL MEDICINE

## 2023-09-05 PROCEDURE — 3008F PR BODY MASS INDEX (BMI) DOCUMENTED: ICD-10-PCS | Mod: CPTII,S$GLB,, | Performed by: INTERNAL MEDICINE

## 2023-09-05 PROCEDURE — 3008F BODY MASS INDEX DOCD: CPT | Mod: CPTII,S$GLB,, | Performed by: INTERNAL MEDICINE

## 2023-09-05 PROCEDURE — 99214 OFFICE O/P EST MOD 30 MIN: CPT | Mod: S$GLB,,, | Performed by: INTERNAL MEDICINE

## 2023-09-05 PROCEDURE — 83540 ASSAY OF IRON: CPT | Performed by: INTERNAL MEDICINE

## 2023-09-05 PROCEDURE — 84466 ASSAY OF TRANSFERRIN: CPT | Performed by: INTERNAL MEDICINE

## 2023-09-05 PROCEDURE — 3078F DIAST BP <80 MM HG: CPT | Mod: CPTII,S$GLB,, | Performed by: INTERNAL MEDICINE

## 2023-09-05 PROCEDURE — 1159F PR MEDICATION LIST DOCUMENTED IN MEDICAL RECORD: ICD-10-PCS | Mod: CPTII,S$GLB,, | Performed by: INTERNAL MEDICINE

## 2023-09-05 PROCEDURE — 99999 PR PBB SHADOW E&M-EST. PATIENT-LVL IV: CPT | Mod: PBBFAC,,, | Performed by: INTERNAL MEDICINE

## 2023-09-05 NOTE — PROGRESS NOTES
SECTION OF HEMATOLOGY AND BONE MARROW TRANSPLANT  Return  Patient Visit   09/05/2023  Referred by:  No ref. provider found  Referred for: ROBERT    CHIEF COMPLAINT:   No chief complaint on file.      HISTORY OF PRESENT ILLNESS:   62 y.o. female Referred to me November 2017  for ROBERT.  Intolerant to po iron.  Iron studies in march 2017 with severe ID.  History RA followed by Dr. Valverde.  History of gastroparesis followed by Dr. Moore in GI.  Denies fever, chills, nightsweats, bleeding, brusing, lymphadenopathy, signs/symptoms of splenomegaly.   Had upper and lower endoscopy summer 2017 unremarkable.  No VCE done.  Denies any overt GI bleeding or other bleeding.    S/p repeat IV feraheme x 2 January 2018,  may 2019 and oct/nov 2019.  Tolerated well.   Maintained ferritin and hgb since that time. Continues close fu with rheum for RA and osteoperosis.      Interval history: Ms. Peterson presentsfor surveillance fu.   Since last appt required spinal surgeyr for bulging disc and ankel repair.  Some post op bleeding with back surgery.    Recent iron studies showed again ROBERT.  Has received 3 doses of venofer at [facility] infusion last month. Insurance declined injectefer/venofer.   Tolerated well       Vitals:    09/05/23 0917   BP: 116/74   Pulse: 85   Resp: 16   Temp: 98.2 °F (36.8 °C)     General - well developed, well nourished, no apparent distress  HEENT - oropharynx clear  Chest and Lung - clear to auscultation bilaterally   Cardiovascular - RRR with no MGR, normal S1 and S2  Abdomen-  soft, nontender, no palpable hepatomegaly or splenomegaly  Lymph - no palpable lymphadenopathy  Heme - no bruising, petechiae, pallor  Skin - no rashes or lesions  Psych - appropriate mood and affect  Ext -chronic changes of arthritis    Lab Results   Component Value Date    WBC 5.38 08/22/2023    HGB 11.0 (L) 08/22/2023    HCT 35.1 (L) 08/22/2023    MCV 82 08/22/2023     08/22/2023       Lab Results   Component Value  Date    IRON 54 05/24/2023    TIBC 465 (H) 05/24/2023    FERRITIN 33 05/24/2023       ROBERT  Microcytic anemia likely ROBERT  due to malabsorption related to GI issues; possible anemia of chronic inflammation from RA and meds contributing  Denies any over GI bleeding; Recent summer 2017 upper and lower GI endoscopy negative; VCE deferred but may need if ROBERT persists  Intolerant to po iron   S/p feraheme x 2 jan 2018, may 2019, and oct/nov 2019 with normalization of hgb and ferritin. Tolerated well.  Again recent ROBERT noted likely from post op blood loss  Has received 3 doses of venofer at [facility] infusion last month. Insurance declined injectefer/venofer.   Tolerated well   Her iron studies have normalized and hgb improved to near normal so can defer further doses for now and plan to recheck her labs in 3 months for possible further iv iron needs      Follow-up:    -Cbc, iron, tibc, ferritin at [place] lab in 3 months   -Virtual MD or NP appt day after labs

## 2023-09-06 ENCOUNTER — PATIENT MESSAGE (OUTPATIENT)
Dept: HEMATOLOGY/ONCOLOGY | Facility: CLINIC | Age: 63
End: 2023-09-06
Payer: MEDICARE

## 2023-09-06 ENCOUNTER — PATIENT MESSAGE (OUTPATIENT)
Dept: ORTHOPEDICS | Facility: CLINIC | Age: 63
End: 2023-09-06
Payer: MEDICARE

## 2023-09-06 ENCOUNTER — OFFICE VISIT (OUTPATIENT)
Dept: RHEUMATOLOGY | Facility: CLINIC | Age: 63
End: 2023-09-06
Payer: MEDICARE

## 2023-09-06 VITALS — WEIGHT: 129 LBS | BODY MASS INDEX: 24.35 KG/M2 | HEIGHT: 61 IN

## 2023-09-06 DIAGNOSIS — M06.9 RHEUMATOID ARTHRITIS, INVOLVING UNSPECIFIED SITE, UNSPECIFIED WHETHER RHEUMATOID FACTOR PRESENT: ICD-10-CM

## 2023-09-06 DIAGNOSIS — R74.8 HIGH GAMMA GLUTAMYL TRANSFERASE (GGT): ICD-10-CM

## 2023-09-06 DIAGNOSIS — R79.89 ELEVATED LIVER FUNCTION TESTS: Primary | ICD-10-CM

## 2023-09-06 DIAGNOSIS — M06.9 RHEUMATOID ARTHRITIS: ICD-10-CM

## 2023-09-06 PROCEDURE — 3008F PR BODY MASS INDEX (BMI) DOCUMENTED: ICD-10-PCS | Mod: CPTII,95,, | Performed by: INTERNAL MEDICINE

## 2023-09-06 PROCEDURE — 99213 PR OFFICE/OUTPT VISIT, EST, LEVL III, 20-29 MIN: ICD-10-PCS | Mod: 95,,, | Performed by: INTERNAL MEDICINE

## 2023-09-06 PROCEDURE — 1159F MED LIST DOCD IN RCRD: CPT | Mod: CPTII,95,, | Performed by: INTERNAL MEDICINE

## 2023-09-06 PROCEDURE — 1159F PR MEDICATION LIST DOCUMENTED IN MEDICAL RECORD: ICD-10-PCS | Mod: CPTII,95,, | Performed by: INTERNAL MEDICINE

## 2023-09-06 PROCEDURE — 99213 OFFICE O/P EST LOW 20 MIN: CPT | Mod: 95,,, | Performed by: INTERNAL MEDICINE

## 2023-09-06 PROCEDURE — 3008F BODY MASS INDEX DOCD: CPT | Mod: CPTII,95,, | Performed by: INTERNAL MEDICINE

## 2023-09-06 RX ORDER — SARILUMAB 200 MG/1.14ML
200 INJECTION, SOLUTION SUBCUTANEOUS
Qty: 2.28 ML | Refills: 1 | Status: ACTIVE | OUTPATIENT
Start: 2023-09-06 | End: 2023-11-06 | Stop reason: SDUPTHER

## 2023-09-06 RX ORDER — LEFLUNOMIDE 20 MG/1
20 TABLET ORAL DAILY
Qty: 90 TABLET | Refills: 0 | Status: SHIPPED | OUTPATIENT
Start: 2023-09-06 | End: 2024-01-22 | Stop reason: SDUPTHER

## 2023-09-06 RX ORDER — TRIAMCINOLONE ACETONIDE 1 MG/G
PASTE DENTAL 2 TIMES DAILY
Qty: 5 G | Refills: 1 | Status: SHIPPED | OUTPATIENT
Start: 2023-09-06 | End: 2024-01-27

## 2023-09-06 NOTE — PROGRESS NOTES
I have personally taken the history and examined the patient and agree with the resident,s note as stated above        The patient location is: home  The chief complaint leading to consultation is: RA; osteoporosis; fibromyalgia; gen OA    Visit type: audiovisual    Face to Face time with patient: 20  30 minutes of total time spent on the encounter, which includes face to face time and non-face to face time preparing to see the patient (eg, review of tests), Obtaining and/or reviewing separately obtained history, Documenting clinical information in the electronic or other health record, Independently interpreting results (not separately reported) and communicating results to the patient/family/caregiver, or Care coordination (not separately reported).         Each patient to whom he or she provides medical services by telemedicine is:  (1) informed of the relationship between the physician and patient and the respective role of any other health care provider with respect to management of the patient; and (2) notified that he or she may decline to receive medical services by telemedicine and may withdraw from such care at any time.    Notes:    Date of Procedure: 1/11/2023      Surgeon: Ariella Finnegan MD     Assistant(s): Turner Kinsey MD - resident assisting  SMA Kyle - assisting      Procedure:   Left mid-tarsal multi arthrodesis (naviculo-cuneiform, 1st tarsometatarsal)  Left triple arthrodesis  Left calcaneal bone graft harvest  Left tendoachilles lengthening     Pre-Operative Diagnosis: Pes planovalgus, acquired, left [M21.42]   Degenerative joint disease of hindfoot, left [M19.072]  Equinus contracture of left ankle [M24.572]     Post-Operative Diagnosis: Same          DATE OF PROCEDURE: 6/22/23     SURGEON: Jh Mosqueda M.D.      FIRST ASSISTANT-SURGEON: Krystal Vargas PA-C, note no resident was available.     PREOPERATIVE DIAGNOSIS:  1.  L4-5  spondylolisthesis, severe lumbar stenosis, and  refractory lumbar radiculopathy     POSTOPERATIVE DIAGNOSIS:   1.  L4-5  spondylolisthesis, severe lumbar stenosis, and refractory lumbar radiculopathy     PROCEDURES PERFORMED:   Combined posterolateral and posterior lumbar interbody fusion  L4-5  2.   Posterior nonsegmental instrumentation L4-5  3.   Application of titanium intervertebral spacer device L4-5 disc defect  4.   L4 laminectomy for decompression of central and bilateral foraminal stenosis  5.   Local bone graft       Latest Reference Range & Units 10/18/22 11:33   Vit D, 25-Hydroxy 30 - 96 ng/mL 38           MRI right hip 5/29/23:     Impression:     Small RIGHT hip effusion.  No evidence for occult osseous injury or muscular/tendinous injury.        Electronically signed by: Miguel Roman MD  Date:                                            05/29/2023  Time:                                           15:22      MRI right shoulder 12/7/22:      Impression:     The supraspinatus and infraspinatus tendons are very thin with signal change suspicious for chronic high-grade tear.  No significant muscle atrophy.     AC joint edema, significant osseous hypertrophy and geodes consistent with active degenerative change.     Biceps tendinosis.     No acute fracture seen.        Electronically signed by: Silvana Sigala MD  Date:                                            12/08/2022        RA  TJ 0 SJ 0  Pt global 50 ESR 8 CRP <0.3 DAS28 3.23(MDA)  ORM86-GQL 2.83(LDA)  CDAI) 9(LDA) but all driven by right hip and right shoulder  fibromyalgia, right shoulder pain, OA right knee  Rheumatoid Arthritis Treatment Goals:  -Disease Activity Measure:CDAI   -Target: LDA  -Patient Goal:  -Therapy/Change: none, activity scores driven by back symptoms, neck symptoms, fibromyalgia , right shoulder, OA right knee, not RA. No need to change sarilumab to upadacitinib  -Shared Decision Making: Discussed goals of care and therapy plan together with patient as outlined above.       Osteoporosis DXA 3/8/22: normal with FRAX : hip 1.6% major 23%  : completed 2 years of teriparatide added to denosumab, now denosumab alone  Fibromyalgia;   OA  Right hip pain with small effusion, mild degenerative changes, anterior-superior labral tear, partial tears gluteus minimus and medius tendons, greater trochanteric bursiti, subtotal tear proximal hamstring on MRI 4/25/23  Right shoulder pain with rotator cuff tendinitis ?tear  OA with Right knee effusion  Oral ulcers    *asked her to check with Dr. Jaylin Villalpando to be sure they are OK with her taking naproxen as often NSAIDs are not permitted for a time post spinal fusion.   Kenalog in [employer] for oral ulcers  Hepatic function and GGT next week day of f/u with Hepatology/fibroscan  F/u Dr. Yarbrough for right shoulder pain  F/u Dr. Díaz for right hip pain, effusion, refractory to injection x 2.  And OA right knee  Pregabalin 150mg three times daily Krystal Ross  Continue sarilumab 200 mg sc q 2 wks  Continue leflunomide 20mg daily  Continue prednisone  5 mg daily   continue yoga on [employer]   Rodrigo Chi for fibromyalgia and OA knees and hips  Aerobic exercises stationary bike  Continue denosumab 60 mg sc q 6 months next 12/8/23  1200 mg dietary calcium daily   RTC 3 months with standing labs, lipid panel, vitamin D

## 2023-09-06 NOTE — PROGRESS NOTES
9/6/2023     9:49 AM   Rapid3 Question Responses and Scores   MDHAQ Score 1.7   Psychologic Score 1.1   Pain Score 5   When you awakened in the morning OVER THE LAST WEEK, did you feel stiff? Yes   If Yes, please indicate the number of hours until you are as limber as you will be for the day 24   Fatigue Score 4   Global Health Score 5   RAPID3 Score 5.22     Answers submitted by the patient for this visit:  Rheumatology Questionnaire (Submitted on 9/6/2023)  fever: No  eye redness: Yes  mouth sores: Yes  headaches: No  shortness of breath: No  chest pain: No  trouble swallowing: No  diarrhea: No  constipation: No  unexpected weight change: No  genital sore: No  dysuria: No  During the last 3 days, have you had a skin rash?: No  Bruises or bleeds easily: No  cough: No

## 2023-09-06 NOTE — PROGRESS NOTES
Patient ID:  Joyce Peterson    YOB: 1960     MRN:  [id number]     Subjective:     Chief Complaint:  rheumatoid arthritis management     History of Present Illness:  Patient is a 62 y.o. female with RA, osteoporosis who presents today for virtual follow up. LCV was 1/2023.     Today: Since her last visit, the patient has undergone L4/L5 lumbar soine fusion on 6/22. She states that she is doing overall well since surgery. She reports that her lumbar radicular symptoms have subsided but that she still has some pain over her surgical site. The patient's primry concern today is that she has been experiencing painful aphthous ulcers which she attributes to Kevzara therapy. She was due for a dose on 9/5 but did not take it because she was concerned her ulcers would worsen. She was reassured today that it would be unlikely for kevzara to be the cause of her ulcers and that she should continue with her scheduled dose today. She states that overall her RA is well controlled and seems to have improved while she has been resting after surgery. She denies any tenderness or swelling to the bilateral PIPs, DIPs, and wrists today. She denies tenderness to palpation or swelling to any other joints today. Today, she also endorses right hip pain which is worse with ambulation and right knee effusion that has gradually worsened over the past few months. She has had her knee aspirated in the past. She also endorses right shoulder pain over the past few months. She follows with orthopedics for her shoulder, hip, and knee.     Review of Systems   Review of Systems   Constitutional:  Negative for fever.   HENT:  Positive for mouth sores. Negative for sneezing and sore throat.    Eyes:  Negative for pain and redness.   Respiratory:  Negative for cough, shortness of breath and wheezing.    Cardiovascular:  Negative for chest pain.   Gastrointestinal:  Negative for abdominal pain and vomiting.   Musculoskeletal:  Positive for  back pain and joint swelling. Negative for neck pain and neck stiffness.        Positive for low back pain without radiculopathy, right shoulder pain and right hip pain. Positive for right knee swelling.   Neurological:  Negative for headaches.        Current Medications:    Current Outpatient Medications:     acetaminophen (TYLENOL) 500 MG tablet, Take 2 tablets (1,000 mg total) by mouth every 8 (eight) hours., Disp: 60 tablet, Rfl: 0    albuterol (PROVENTIL/VENTOLIN HFA) 90 mcg/actuation inhaler, Inhale 2 puffs into the lungs every 6 (six) hours as needed. Rescue, Disp: 20.1 g, Rfl: 11    budesonide-formoterol 160-4.5 mcg (SYMBICORT) 160-4.5 mcg/actuation HFAA, Inhale 2 puffs into the lungs every 12 (twelve) hours., Disp: 30.6 g, Rfl: 3    calcium citrate-vitamin D3 315-200 mg (CITRACAL+D) 315 mg-5 mcg (200 unit) per tablet, Take 1 tablet by mouth 2 (two) times daily. , Disp: , Rfl:     cycloSPORINE (RESTASIS) 0.05 % ophthalmic emulsion, PLACE 1 DROP INTO BOTH EYES TWICE A DAY, Disp: 180 each, Rfl: 3    diclofenac sodium (VOLTAREN) 1 % Gel, APPLY 2 GRAMS TOPICALLY 4 (FOUR) TIMES DAILY AS NEEDED., Disp: 300 g, Rfl: 2    doxycycline (VIBRA-TABS) 100 MG tablet, Take 1 tablet (100 mg total) by mouth 2 (two) times daily. for 14 days, Disp: 28 tablet, Rfl: 1    fluconazole (DIFLUCAN) 150 MG Tab, Take 150 mg by mouth as needed., Disp: , Rfl:     furosemide (LASIX) 20 MG tablet, Take 1 tablet (20 mg total) by mouth once daily., Disp: 5 tablet, Rfl: 0    halobetasol propion-tazarotene (DUOBRII) 0.01-0.045 % Lotn, aaa on feet and hands qhs  then vaseline and warm towel, Disp: 100 g, Rfl: 3    INV PROPULSID 10 MG, Take 2 tablets (20 mg) by mouth 4 (four) times daily. FOR INVESTIGATIONAL USE ONLY. Protocol [id number]-154 Subject ID: [id number]., Disp: 1440 each, Rfl: 0    ketoconazole (NIZORAL) 2 % cream, Apply to feet twice daily for 3 weeks, Disp: 60 g, Rfl: 3    lifitegrast (XIIDRA) 5 % Dpet, INSTILL ONE DROP INTO BOTH EYES TWICE  A DAY, Disp: 60 mL, Rfl: 10    magic mouthwash diphen/antac/lidoc, Swish and Spit 5 mL by mouth for 30 seconds twice daily., Disp: 150 mL, Rfl: 0    methenamine (HIPREX) 1 gram Tab, Take 1 tablet (1 g total) by mouth 2 (two) times daily., Disp: 180 tablet, Rfl: 3    methocarbamoL (ROBAXIN) 750 MG Tab, Take 1 tablet (750 mg total) by mouth 3 (three) times daily as needed., Disp: 90 tablet, Rfl: 1    mirabegron (MYRBETRIQ) 25 mg Tb24 ER tablet, Take 1 tablet (25 mg total) by mouth once daily., Disp: 90 tablet, Rfl: 3    montelukast (SINGULAIR) 10 mg tablet, Take 1 tablet (10 mg total) by mouth every evening., Disp: 90 tablet, Rfl: 3    naproxen (NAPROSYN) 500 MG tablet, TAKE 1 TABLET (500 MG TOTAL) BY MOUTH 2 (TWO) TIMES DAILY AS NEEDED, Disp: 180 tablet, Rfl: 1    omeprazole (PRILOSEC) 40 MG capsule, Take 1 capsule (40 mg total) by mouth every morning., Disp: 30 capsule, Rfl: 6    omeprazole-sodium bicarbonate (ZEGERID) 40-1.1 mg-gram per capsule, TAKE 1 CAPSULE BY MOUTH EVERY DAY IN THE MORNING, Disp: 90 capsule, Rfl: 3    oxyCODONE-acetaminophen (PERCOCET) 5-325 mg per tablet, Take 1 tablet by mouth every 4 (four) hours as needed for Pain., Disp: 42 tablet, Rfl: 0    potassium chloride SA (K-DUR,KLOR-CON) 20 MEQ tablet, Take 1 tablet (20 mEq total) by mouth once daily. Take with lasix, Disp: 5 tablet, Rfl: 0    POTASSIUM ORAL, Take by mouth once daily., Disp: , Rfl:     predniSONE (DELTASONE) 5 MG tablet, TAKE 1 TABLET BY MOUTH EVERY DAY, Disp: 90 tablet, Rfl: 1    pregabalin (LYRICA) 150 MG capsule, Take 1 capsule (150 mg total) by mouth 3 (three) times daily., Disp: 90 capsule, Rfl: 2    PREMARIN vaginal cream, PLACE 0.5 GRAM VAGINALLY 3 (THREE) TIMES A WEEK., Disp: 30 g, Rfl: 1    promethazine (PHENERGAN) 25 MG tablet, Take 1 tablet (25 mg total) by mouth every 6 (six) hours as needed for Nausea., Disp: 15 tablet, Rfl: 0    promethazine (PHENERGAN) 25 MG tablet, Take 1 tablet (25 mg total) by mouth every 4  (four) hours as needed for Nausea., Disp: 30 tablet, Rfl: 0    rosuvastatin (CRESTOR) 20 MG tablet, TAKE 1 TABLET BY MOUTH EVERY DAY IN THE EVENING, Disp: 90 tablet, Rfl: 3    sucralfate (CARAFATE) 1 gram tablet, TAKE 1 TABLET (1 G TOTAL) BY MOUTH 4 (FOUR) TIMES DAILY., Disp: 360 tablet, Rfl: 2    sucralfate (CARAFATE) 1 gram tablet, Take 1 tablet (1 g total) by mouth 4 (four) times daily., Disp: 360 tablet, Rfl: 3    albuterol-ipratropium (DUO-NEB) 2.5 mg-0.5 mg/3 mL nebulizer solution, Take 3 mLs by nebulization every 6 (six) hours as needed for Wheezing. Rescue, Disp: 90 mL, Rfl: 3    aspirin 325 MG tablet, Take 1 tablet (325 mg total) by mouth once daily., Disp: 30 tablet, Rfl: 0    leflunomide (ARAVA) 20 MG Tab, Take 1 tablet (20 mg total) by mouth once daily., Disp: 90 tablet, Rfl: 0    sarilumab (KEVZARA) 200 mg/1.14 mL Syrg, Inject 1.14 mL (200 mg total) into the skin every 14 (fourteen) days., Disp: 2.28 mL, Rfl: 1    triamcinolone acetonide 0.1% (KENALOG) 0.1 % paste, Place onto teeth 2 (two) times daily. Apply to oral ulcers twice daily until resolved, Disp: 5 g, Rfl: 1    Objective:   **Physical exam limited as patient was evaluated virtually**    Physical Exam       No tenderness to palpation of the bilateral PIPs, DIPs, wrists, neck, left shoulder or left knee when asking the patient to self palpate. Tenderness to right shoulder, right heip, and right knee on self palpation.      12/9/2021 6/23/2022 10/6/2022 9/6/2023   Tender (GURROLA-28) 24 / 28 1 / 28 1 / 28 2 / 28    Swollen (GURROLA-28) 0 / 28 0 / 28 0 / 28 1 / 28    Provider Global 0 mm 10 mm 10 mm 10 mm   Patient Global 90 mm 90 mm 75 mm 50 mm   ESR 1 mm/hr 1 mm/hr 2 mm/hr 8 mm/hr   CRP 0.3 mg/L 0.3 mg/L 0.3 mg/L 0.3 mg/L   GURROLA-28 (ESR) 4 (Moderate disease activity) 1.82 (Remission) 2.1 (Remission) 3.23 (Moderate disease activity)   GURROLA-28 (CRP) 5.06 (Moderate disease activity) 2.87 (Low disease activity) 2.66 (Low disease activity) 2.83 (Low  disease activity)   CDAI Score 33  11  9.5  9               Assessment / Plan:     Rheumatoid arthritis  - continue sarilumab 200 mg sc every 2 weeks  - continue leflunomide 20 mg daily  - Prednisone decreased form 6 mg daily to 5 mg  Osteoporosis   - Continue denosumab 60 mg sc q 6 months next 12/8/23  - 1200 mg dietary calcium daily  Oral ulcers  - Kenalog in [employer] for oral ulcers  Fibromyalgia  - continue kelly chi  - continue aerobic exercise   - continue yoga  Mildly elevated LFT's  - Hepatic function and GGT next week day of f/u with Hepatology/fibroscan  Osteoarthritis  - Patient currently complaining of right shoulder pain, right hip pain, and right knee effusion / pain.   - F/u Dr. Yarbrough for right shoulder pain  - f/u Dr. Díaz for right hip pain, effusion, refractory to injection x 2.  And OA right knee  - continue aerobic exercise on stationary bike and kelly chi     RTC 3 months with standing labs, lipid panel, vitamin D      Ricardo Garcia M.D.  PGY-1  [employer] [facility]

## 2023-09-07 ENCOUNTER — OFFICE VISIT (OUTPATIENT)
Dept: ORTHOPEDICS | Facility: CLINIC | Age: 63
End: 2023-09-07
Payer: MEDICARE

## 2023-09-07 ENCOUNTER — HOSPITAL ENCOUNTER (OUTPATIENT)
Dept: RADIOLOGY | Facility: HOSPITAL | Age: 63
Discharge: HOME | End: 2023-09-07
Attending: ORTHOPAEDIC SURGERY
Payer: MEDICARE

## 2023-09-07 VITALS — HEIGHT: 61 IN | WEIGHT: 129 LBS | BODY MASS INDEX: 24.35 KG/M2

## 2023-09-07 DIAGNOSIS — M06.9 RHEUMATOID ARTHRITIS INVOLVING MULTIPLE SITES, UNSPECIFIED WHETHER RHEUMATOID FACTOR PRESENT: ICD-10-CM

## 2023-09-07 DIAGNOSIS — R52 PAIN: ICD-10-CM

## 2023-09-07 DIAGNOSIS — R52 PAIN: Primary | ICD-10-CM

## 2023-09-07 DIAGNOSIS — Z98.1 ARTHRODESIS STATUS: ICD-10-CM

## 2023-09-07 PROCEDURE — 99999 PR PBB SHADOW E&M-EST. PATIENT-LVL IV: CPT | Mod: PBBFAC,,, | Performed by: ORTHOPAEDIC SURGERY

## 2023-09-07 PROCEDURE — 99213 PR OFFICE/OUTPT VISIT, EST, LEVL III, 20-29 MIN: ICD-10-PCS | Mod: 24,S$GLB,, | Performed by: ORTHOPAEDIC SURGERY

## 2023-09-07 PROCEDURE — 99999 PR PBB SHADOW E&M-EST. PATIENT-LVL IV: ICD-10-PCS | Mod: PBBFAC,,, | Performed by: ORTHOPAEDIC SURGERY

## 2023-09-07 PROCEDURE — 1160F RVW MEDS BY RX/DR IN RCRD: CPT | Mod: CPTII,S$GLB,, | Performed by: ORTHOPAEDIC SURGERY

## 2023-09-07 PROCEDURE — 73630 X-RAY EXAM OF FOOT: CPT | Mod: TC,50

## 2023-09-07 PROCEDURE — 3008F PR BODY MASS INDEX (BMI) DOCUMENTED: ICD-10-PCS | Mod: CPTII,S$GLB,, | Performed by: ORTHOPAEDIC SURGERY

## 2023-09-07 PROCEDURE — 73630 XR FOOT COMPLETE 3 VIEW BILATERAL: ICD-10-PCS | Mod: 26,50,, | Performed by: RADIOLOGY

## 2023-09-07 PROCEDURE — 73630 X-RAY EXAM OF FOOT: CPT | Mod: 26,50,, | Performed by: RADIOLOGY

## 2023-09-07 PROCEDURE — 1160F PR REVIEW ALL MEDS BY PRESCRIBER/CLIN PHARMACIST DOCUMENTED: ICD-10-PCS | Mod: CPTII,S$GLB,, | Performed by: ORTHOPAEDIC SURGERY

## 2023-09-07 PROCEDURE — 1159F PR MEDICATION LIST DOCUMENTED IN MEDICAL RECORD: ICD-10-PCS | Mod: CPTII,S$GLB,, | Performed by: ORTHOPAEDIC SURGERY

## 2023-09-07 PROCEDURE — 3008F BODY MASS INDEX DOCD: CPT | Mod: CPTII,S$GLB,, | Performed by: ORTHOPAEDIC SURGERY

## 2023-09-07 PROCEDURE — 1159F MED LIST DOCD IN RCRD: CPT | Mod: CPTII,S$GLB,, | Performed by: ORTHOPAEDIC SURGERY

## 2023-09-07 PROCEDURE — 99213 OFFICE O/P EST LOW 20 MIN: CPT | Mod: 24,S$GLB,, | Performed by: ORTHOPAEDIC SURGERY

## 2023-09-07 NOTE — PROGRESS NOTES
DATE: 9/7/2023  PATIENT: Joyce Peterson    CHIEF COMPLAINT: left foot fusion follow up - Dr. Finnegan patient    HISTORY:  Joyce Peterson is a 62 y.o. female with rheumatoid arthritis here for evaluation of her bilateral feet.  She underwent a left foot triple arthrodesis and midfoot fusion on January 11, 2023 with Dr. Finnegan.  She has seen Dr. Finnegan and her team for postoperative followups until this date.  She presents today for continued follow-up and to discuss her right foot.  Regarding the patient's left foot, she states that she has pain over the heel when she walks without shoes and that this is new since surgery.  She does not have pain with ambulation if she is wearing tennis shoes.  She also reports new flexion deformities of her lesser toes since the surgery.  These deformities are not painful, but she states they make it more difficult to walk.  She endorses numbness and tingling in the left lower extremity from the toes up to the mid lateral leg.  She denies shooting pain from her back and states that her previous lumbar radiculopathy primarily affected the right lower extremity.    Regarding her right foot, the patient reports a stable flat foot as well as multiple deformities of the toes.  Her primary complaint with the right foot today is noticing a small bump on the dorsal aspect of her foot which will intermittently cause pain.  She also reports intermittently developing a blister over the dorsal aspect of the 3rd toe due to multiple hammertoe deformities.  She will place a soft pad over the toe if needed if she develops a blister in this area.      PAST MEDICAL/SURGICAL HISTORY:  Past Medical History:   Diagnosis Date    Acid reflux     Allergy     Anemia     Asthma     Coronary artery disease     CS (cervical spondylosis) 03/08/2013    Degenerative disc disease     Degenerative joint disease of hindfoot, left 6/28/2022    Dry eyes     Dry mouth     Gastroparesis     History of methotrexate  therapy 01/19/2022    Hyperlipidemia     Lateral meniscus derangement 04/06/2016    Lobular carcinoma in situ     Lumbar spondylosis 03/08/2013    Osteoarthritis     Osteoporosis     Pes planovalgus, acquired, left 6/28/2022    Rheumatoid arthritis(714.0)     Rupture of left triceps tendon 10/17/2018    Umbilical hernia 08/13/2015     Past Surgical History:   Procedure Laterality Date    BREAST BIOPSY Left 01/29/2002    core bx    CARPAL TUNNEL RELEASE Right 05/2017    CHOLECYSTECTOMY  2004    COLONOSCOPY      10/11    COLONOSCOPY N/A 6/29/2017    Procedure: COLONOSCOPY;  Surgeon: López Moore MD;  Location: [facility] ENDO (4TH FLR);  Service: Endoscopy;  Laterality: N/A;    EPIDURAL STEROID INJECTION N/A 11/18/2021    Procedure: INJECTION, STEROID, EPIDURAL, L5-S1 IL NEED CONSENT;  Surgeon: Tj Mayfield MD;  Location: [facility];  Service: Pain Management;  Laterality: N/A;    EPIDURAL STEROID INJECTION N/A 9/29/2022    Procedure: LUMBAR L3/L4 IL MADELINE CONTRAST;  Surgeon: Tj Mayfield MD;  Location: [facility];  Service: Pain Management;  Laterality: N/A;    EPIDURAL STEROID INJECTION N/A 12/12/2022    Procedure: INJECTION, STEROID, EPIDURAL L5/S1 IL CONTRAST;  Surgeon: Tj Mayfield MD;  Location: [facility];  Service: Pain Management;  Laterality: N/A;    EPIDURAL STEROID INJECTION N/A 4/6/2023    Procedure: INJECTION, STEROID, EPIDURAL L5/S1;  Surgeon: Tj Mayfield MD;  Location: [facility];  Service: Pain Management;  Laterality: N/A;    FOOT ARTHRODESIS Left 1/11/2023    Procedure: FUSION, FOOT;  Surgeon: Ariella Finnegan MD;  Location: [facility];  Service: Orthopedics;  Laterality: Left;  nimbus    FUSION, SPINE, LUMBAR, TLIF, POSTERIOR APPROACH, USING PEDICLE SCREW N/A 6/22/2023    Procedure: FUSION, SPINE, LUMBAR, TLIF, POSTERIOR APPROACH, USING PEDICLE SCREW L4/5 spinewave SNS:SSEP/EMG;  Surgeon: Jh Mosqueda MD;  Location: [facility] 2ND FLR;  Service: Neurosurgery;  Laterality: N/A;    HARVESTING OF  BONE GRAFT Left 1/11/2023    Procedure: SURGICAL PROCUREMENT, BONE GRAFT;  Surgeon: Ariella Finnegan MD;  Location: [facility];  Service: Orthopedics;  Laterality: Left;    INJECTION OF ANESTHETIC AGENT AROUND NERVE Bilateral 8/23/2021    Procedure: BLOCK, NERVE, MEDIAL BRANCH L3,L4,L5;  Surgeon: Tj Mayfield MD;  Location: [facility];  Service: Pain Management;  Laterality: Bilateral;  1 of 2    INJECTION OF ANESTHETIC AGENT AROUND NERVE Bilateral 8/26/2021    Procedure: BLOCK, NERVE, MEDIAL BRANCH L3,L4,L5;  Surgeon: Tj Mayfield MD;  Location: [facility];  Service: Pain Management;  Laterality: Bilateral;  2 of 2    INJECTION OF ANESTHETIC AGENT AROUND NERVE Left 7/7/2022    Procedure: BLOCK, NERVE, LEFT OBTURATOR AND FEMORAL;  Surgeon: Tj Mayfield MD;  Location: [facility];  Service: Pain Management;  Laterality: Left;    INJECTION OF FACET JOINT Bilateral 3/12/2020    Procedure: FACET JOINT INJECTION (LUMBAR BLOCK) BILATERAL L4-5 AND L5-S1 DIRECT REFERRAL;  Surgeon: Tj Mayfield MD;  Location: [facility];  Service: Pain Management;  Laterality: Bilateral;  NEEDS CONSENT    INJECTION OF FACET JOINT Bilateral 3/29/2021    Procedure: INJECTION, FACET JOINT L3/4, L4/5, L5/S1;  Surgeon: Tj Mayfield MD;  Location: [facility];  Service: Pain Management;  Laterality: Bilateral;    INJECTION OF JOINT Right 7/30/2020    Procedure: INJECTION, JOINT, RIGHT HIP Interarticular under flouro;  Surgeon: Tj Mayfield MD;  Location: [facility];  Service: Pain Management;  Laterality: Right;  INJECTION, JOINT, RIGHT HIP Interarticular under flouro    INJECTION OF JOINT Right 2/21/2022    Procedure: Injection, Joint RIGHT ISCHIAL BURSA;  Surgeon: Tj Mayfield MD;  Location: [facility];  Service: Pain Management;  Laterality: Right;    INTRAMEDULLARY RODDING OF FEMUR Left 5/21/2019    Procedure: INSERTION, INTRAMEDULLARY ARIEL, FEMUR;  Surgeon: López Duff MD;  Location: [address];  Service: Orthopedics;   Laterality: Left;    LENGTHENING OF TENDON OF LOWER EXTREMITY Left 1/11/2023    Procedure: LENGTHENING, TENDON, LOWER EXTREMITY;  Surgeon: Ariella Finnegan MD;  Location: [facility] OR;  Service: Orthopedics;  Laterality: Left;    MAGNETIC RESONANCE IMAGING N/A 5/29/2023    Procedure: MRI (Magnetic Resonance Imagine);  Surgeon: Sujey Surgeon;  Location: [facility];  Service: Anesthesiology;  Laterality: N/A;    RADIOFREQUENCY ABLATION Left 9/20/2021    Procedure: RADIOFREQUENCY ABLATION left L3,4,5 RFA  1 of 2  CONSENT NEEDED;  Surgeon: Tj Mayfield MD;  Location: [facility];  Service: Pain Management;  Laterality: Left;    RADIOFREQUENCY ABLATION Right 10/4/2021    Procedure: RADIOFREQUENCY ABLATION  right L3,4,5 RFA   2 of 2  CONSENT NEEDED;  Surgeon: Tj Mayfield MD;  Location: [facility];  Service: Pain Management;  Laterality: Right;    RADIOFREQUENCY ABLATION Left 4/21/2022    Procedure: Radiofrequency Ablation LEFT L3,L4,L5;  Surgeon: Tj Mayfield MD;  Location: [facility];  Service: Pain Management;  Laterality: Left;    RADIOFREQUENCY ABLATION Right 5/12/2022    Procedure: Radiofrequency Ablation RIGHT L3,L4,L5;  Surgeon: Tj Mayfield MD;  Location: [facility];  Service: Pain Management;  Laterality: Right;    RADIOFREQUENCY ABLATION Left 7/25/2022    Procedure: Radiofrequency Ablation Left Femoral & Obturator;  Surgeon: Tj Mayfield MD;  Location: [facility];  Service: Pain Management;  Laterality: Left;    REPAIR OF TRICEPS TENDON Left 10/17/2018    Procedure: REPAIR, TENDON, TRICEPS left elbow;  Surgeon: Staci Yarbrough MD;  [address]  Service: Orthopedics;  Laterality: Left;  Anesthesia: General and regional. PRONE, k-wire , hand pan 1 and pan 2, CALL ARTHREX/Tamera notified 10-12 LO    TONSILLECTOMY      TRANSFORAMINAL EPIDURAL INJECTION OF STEROID Right 8/31/2020    Procedure: INJECTION, STEROID, EPIDURAL, TRANSFORAMINAL,  APPROACH, L3-L4 and L4-L5 need consent;   Surgeon: Tj Mayfield MD;  Location: [facility];  Service: Pain Management;  Laterality: Right;    TRANSFORAMINAL EPIDURAL INJECTION OF STEROID Bilateral 7/23/2021    Procedure: INJECTION, STEROID, EPIDURAL, TRANSFORAMINAL APPROACH L4/5;  Surgeon: Tj Mayfield MD;  Location: [facility];  Service: Pain Management;  Laterality: Bilateral;    TRIGGER POINT INJECTION N/A 7/23/2021    Procedure: INJECTION, TRIGGER POINT SCAPULAR;  Surgeon: Tj Mayfield MD;  Location: [facility];  Service: Pain Management;  Laterality: N/A;    TUBAL LIGATION  2003    UPPER GASTROINTESTINAL ENDOSCOPY      10/11    uterine ablation  2003       Current Medications:   Current Outpatient Medications:     acetaminophen (TYLENOL) 500 MG tablet, Take 2 tablets (1,000 mg total) by mouth every 8 (eight) hours., Disp: 60 tablet, Rfl: 0    albuterol (PROVENTIL/VENTOLIN HFA) 90 mcg/actuation inhaler, Inhale 2 puffs into the lungs every 6 (six) hours as needed. Rescue, Disp: 20.1 g, Rfl: 11    budesonide-formoterol 160-4.5 mcg (SYMBICORT) 160-4.5 mcg/actuation HFAA, Inhale 2 puffs into the lungs every 12 (twelve) hours., Disp: 30.6 g, Rfl: 3    calcium citrate-vitamin D3 315-200 mg (CITRACAL+D) 315 mg-5 mcg (200 unit) per tablet, Take 1 tablet by mouth 2 (two) times daily. , Disp: , Rfl:     cycloSPORINE (RESTASIS) 0.05 % ophthalmic emulsion, PLACE 1 DROP INTO BOTH EYES TWICE A DAY, Disp: 180 each, Rfl: 3    diclofenac sodium (VOLTAREN) 1 % Gel, APPLY 2 GRAMS TOPICALLY 4 (FOUR) TIMES DAILY AS NEEDED., Disp: 300 g, Rfl: 2    fluconazole (DIFLUCAN) 150 MG Tab, Take 150 mg by mouth as needed., Disp: , Rfl:     furosemide (LASIX) 20 MG tablet, Take 1 tablet (20 mg total) by mouth once daily., Disp: 5 tablet, Rfl: 0    halobetasol propion-tazarotene (DUOBRII) 0.01-0.045 % Lotn, aaa on feet and hands qhs  then vaseline and warm towel, Disp: 100 g, Rfl: 3    INV PROPULSID 10 MG, Take 2 tablets (20 mg) by mouth 4 (four) times daily. FOR  INVESTIGATIONAL USE ONLY. Protocol [id number] Subject ID: [id number]., Disp: 1440 each, Rfl: 0    ketoconazole (NIZORAL) 2 % cream, Apply to feet twice daily for 3 weeks, Disp: 60 g, Rfl: 3    leflunomide (ARAVA) 20 MG Tab, Take 1 tablet (20 mg total) by mouth once daily., Disp: 90 tablet, Rfl: 0    lifitegrast (XIIDRA) 5 % Dpet, INSTILL ONE DROP INTO BOTH EYES TWICE A DAY, Disp: 60 mL, Rfl: 10    magic mouthwash diphen/antac/lidoc, Swish and Spit 5 mL by mouth for 30 seconds twice daily., Disp: 150 mL, Rfl: 0    methenamine (HIPREX) 1 gram Tab, Take 1 tablet (1 g total) by mouth 2 (two) times daily., Disp: 180 tablet, Rfl: 3    methocarbamoL (ROBAXIN) 750 MG Tab, Take 1 tablet (750 mg total) by mouth 3 (three) times daily as needed., Disp: 90 tablet, Rfl: 1    mirabegron (MYRBETRIQ) 25 mg Tb24 ER tablet, Take 1 tablet (25 mg total) by mouth once daily., Disp: 90 tablet, Rfl: 3    montelukast (SINGULAIR) 10 mg tablet, Take 1 tablet (10 mg total) by mouth every evening., Disp: 90 tablet, Rfl: 3    naproxen (NAPROSYN) 500 MG tablet, TAKE 1 TABLET (500 MG TOTAL) BY MOUTH 2 (TWO) TIMES DAILY AS NEEDED, Disp: 180 tablet, Rfl: 1    omeprazole (PRILOSEC) 40 MG capsule, Take 1 capsule (40 mg total) by mouth every morning., Disp: 30 capsule, Rfl: 6    omeprazole-sodium bicarbonate (ZEGERID) 40-1.1 mg-gram per capsule, TAKE 1 CAPSULE BY MOUTH EVERY DAY IN THE MORNING, Disp: 90 capsule, Rfl: 3    oxyCODONE-acetaminophen (PERCOCET) 5-325 mg per tablet, Take 1 tablet by mouth every 4 (four) hours as needed for Pain., Disp: 42 tablet, Rfl: 0    potassium chloride SA (K-DUR,KLOR-CON) 20 MEQ tablet, Take 1 tablet (20 mEq total) by mouth once daily. Take with lasix, Disp: 5 tablet, Rfl: 0    POTASSIUM ORAL, Take by mouth once daily., Disp: , Rfl:     predniSONE (DELTASONE) 5 MG tablet, TAKE 1 TABLET BY MOUTH EVERY DAY, Disp: 90 tablet, Rfl: 1    pregabalin (LYRICA) 150 MG capsule, Take 1 capsule (150 mg total) by mouth 3 (three) times  daily., Disp: 90 capsule, Rfl: 2    PREMARIN vaginal cream, PLACE 0.5 GRAM VAGINALLY 3 (THREE) TIMES A WEEK., Disp: 30 g, Rfl: 1    promethazine (PHENERGAN) 25 MG tablet, Take 1 tablet (25 mg total) by mouth every 6 (six) hours as needed for Nausea., Disp: 15 tablet, Rfl: 0    promethazine (PHENERGAN) 25 MG tablet, Take 1 tablet (25 mg total) by mouth every 4 (four) hours as needed for Nausea., Disp: 30 tablet, Rfl: 0    rosuvastatin (CRESTOR) 20 MG tablet, TAKE 1 TABLET BY MOUTH EVERY DAY IN THE EVENING, Disp: 90 tablet, Rfl: 3    sarilumab (KEVZARA) 200 mg/1.14 mL Syrg, Inject 1.14 mL (200 mg total) into the skin every 14 (fourteen) days., Disp: 2.28 mL, Rfl: 1    sucralfate (CARAFATE) 1 gram tablet, TAKE 1 TABLET (1 G TOTAL) BY MOUTH 4 (FOUR) TIMES DAILY., Disp: 360 tablet, Rfl: 2    sucralfate (CARAFATE) 1 gram tablet, Take 1 tablet (1 g total) by mouth 4 (four) times daily., Disp: 360 tablet, Rfl: 3    triamcinolone acetonide 0.1% (KENALOG) 0.1 % paste, Place onto teeth 2 (two) times daily. Apply to oral ulcers twice daily until resolved, Disp: 5 g, Rfl: 1    albuterol-ipratropium (DUO-NEB) 2.5 mg-0.5 mg/3 mL nebulizer solution, Take 3 mLs by nebulization every 6 (six) hours as needed for Wheezing. Rescue, Disp: 90 mL, Rfl: 3    aspirin 325 MG tablet, Take 1 tablet (325 mg total) by mouth once daily., Disp: 30 tablet, Rfl: 0    Social History:   Social History     Socioeconomic History    Marital status:    Tobacco Use    Smoking status: Never    Smokeless tobacco: Never   Substance and Sexual Activity    Alcohol use: Yes     Comment: 2-3 times per year.    Drug use: No    Sexual activity: Yes     Partners: Male     Birth control/protection: Surgical     Social Determinants of Health     Financial Resource Strain: Low Risk  (9/6/2023)    Overall Financial Resource Strain (CARDIA)     Difficulty of Paying Living Expenses: Not hard at all   Food Insecurity: No Food Insecurity (9/6/2023)    Hunger Vital  "Sign     Worried About Running Out of Food in the Last Year: Never true     Ran Out of Food in the Last Year: Never true   Transportation Needs: No Transportation Needs (9/6/2023)    PRAPARE - Transportation     Lack of Transportation (Medical): No     Lack of Transportation (Non-Medical): No   Physical Activity: Inactive (9/6/2023)    Exercise Vital Sign     Days of Exercise per Week: 0 days     Minutes of Exercise per Session: 0 min   Stress: No Stress Concern Present (9/6/2023)    [employer] [place] [employer] - Occupational Stress Questionnaire     Feeling of Stress : Not at all   Social Connections: Socially Integrated (9/6/2023)    Social Connection and Isolation Panel [NHANES]     Frequency of Communication with Friends and Family: Twice a week     Frequency of Social Gatherings with Friends and Family: More than three times a week     Attends Faith Services: More than 4 times per year     Active Member of Clubs or Organizations: Yes     Attends Club or Organization Meetings: More than 4 times per year     Marital Status:    Housing Stability: Low Risk  (9/6/2023)    Housing Stability Vital Sign     Unable to Pay for Housing in the Last Year: No     Number of Places Lived in the Last Year: 1     Unstable Housing in the Last Year: No       REVIEW OF SYSTEMS:  Constitution: Negative. Negative for chills, fever and night sweats.   Cardiovascular: Negative for chest pain and syncope.   Respiratory: Negative for cough and shortness of breath.   Gastrointestinal: See HPI. Negative for nausea/vomiting. Negative for abdominal pain.  Genitourinary: See HPI. Negative for discoloration or dysuria.  Skin: Negative for dry skin, itching and rash.   Hematologic/Lymphatic: Negative for bleeding problem.  Musculoskeletal: See HPI  Neurological: See HPI.    PHYSICAL EXAMINATION:    Ht 5' 1" (1.549 m)   Wt 58.5 kg (128 lb 15.5 oz)   LMP  (LMP Unknown)   BMI 24.37 kg/m²     General: The patient is a  62 " y.o. female in no apparent distress, the patient is orientatied to person, place and time.   Psych: Normal mood and affect  HEENT:  NCAT, sclera nonicteric  Lungs:  Respirations are equal and unlabored.    Bilateral Foot and Ankle Exam    INSPECTION:        Gait:    Normal    Scars:   Well healed over the left foot. Sinus tarsi incision still with some slight keratosis, but otherwise appears well healed and without any dehiscence.   Swelling:  None   Color:   Normal   Atrophy:  None    ALIGNMENT:    Left foot: normal alignment of the ankle and midfoot. Hammer toe deformities of all lesser toes  Right foot: pes planovalgus with hallux valgus and multiple hammer toe deformities of the lesser toes     TENDERNESS:  Left foot: TTP over the plantar aspect of the posterior calcaneus, otherwise non TTP throughout.  No permanent hardware palpable.  Right foot: Minimal TTP over the dorsal foot over a small osteophyte versus hypertrophic area from her chronic fracture, otherwise non TTP throughout.        STRENGTH: (affected)  Anterior tibialis: 5/5  Posterior tibialis: 5/5  Gastroc-soleus: 5/5  Peroneals:  5/5    NEUROLOGIC TESTING:  All dermatomes foot, ankle and leg have normal sensation light touch    VASCULAR:  2+ pulses PT/DT with brisk capillary refill toes.    IMAGING:     Radiographs of the bilateral feet were ordered and personally reviewed with the patient today.  X-rays of the left foot demonstrate fusion hardware in the hindfoot and midfoot that appears to be in good position with no evidence of hardware failure or loosening.  Stable from prior x-rays.  Bony alignment in the instrumented areas remain excellent.  The hardware in the posterior calcaneus does not appear to be prominent.  X-rays of the right foot notable for chronic fractures of the midshaft 2nd through 4th metatarsals.  Alignment unchanged from prior x-rays.    ASSESSMENT/PLAN:  1. Pain  X-Ray Foot Complete Bilateral      2. Arthrodesis status, left  triple arthrodesis        3. Rheumatoid arthritis involving multiple sites, unspecified whether rheumatoid factor present              Joyce Peterson is a 62 y.o. female status post left foot triple arthrodesis and midfoot fusion in January 2023 as well as pes planovalgus deformity and multiple hammertoes on the right lower extremity.  Her left foot appears to be well healed and is in a stable position.  She does have some pain over the plantar aspect of the posterior calcaneus with ambulation, but there is no prominent hardware that would cause this.  I think her pain is likely due to either fat atrophy or lack of callus over this area as she has not ambulated on this part of her foot in a long time due to her altered foot alignment prior to surgery.  She does have hammertoes of all of her lesser toes on this side, but they are not painful.  No plans for surgical intervention on the left side at this time.  Her right foot has a pes planovalgus deformity as well as multiple hammertoes, but she is not interested in any surgical treatments for that side at this time.  She will continue her shoe wear modification and padding as needed for her toes and bunion.  She will follow up with me as needed.      I have personally taken the history and examined this patient and agree with the residents note as stated above.

## 2023-09-08 ENCOUNTER — SPECIALTY PHARMACY (OUTPATIENT)
Dept: PHARMACY | Facility: CLINIC | Age: 63
End: 2023-09-08
Payer: MEDICARE

## 2023-09-08 NOTE — TELEPHONE ENCOUNTER
Outgoing call: pt stated injection not due until 9/19 informed pt that [facility] will follow up next week to schedule

## 2023-09-11 ENCOUNTER — PATIENT MESSAGE (OUTPATIENT)
Dept: ORTHOPEDICS | Facility: CLINIC | Age: 63
End: 2023-09-11
Payer: MEDICARE

## 2023-09-11 DIAGNOSIS — Z98.890 S/P SPINAL SURGERY: ICD-10-CM

## 2023-09-11 RX ORDER — OXYCODONE AND ACETAMINOPHEN 5; 325 MG/1; MG/1
1 TABLET ORAL EVERY 6 HOURS PRN
Qty: 28 TABLET | Refills: 0 | Status: SHIPPED | OUTPATIENT
Start: 2023-09-11 | End: 2023-09-25 | Stop reason: SDUPTHER

## 2023-09-12 NOTE — TELEPHONE ENCOUNTER
Specialty Pharmacy - Refill Coordination    Specialty Medication Orders Linked to Encounter      Flowsheet Row Most Recent Value   Medication #1 sarilumab (KEVZARA) 200 mg/1.14 mL Syrg (Order#[id number], Rx#[id number])            Refill Questions - Documented Responses      Flowsheet Row Most Recent Value   Patient Availability and HIPAA Verification    Does patient want to proceed with activity? Yes   HIPAA/medical authority confirmed? Yes   Relationship to patient of person spoken to? Self   Refill Screening Questions    Changes to allergies? No   Changes to medications? No   New conditions since last clinic visit? No   Unplanned office visit, urgent care, ED, or hospital admission in the last 4 weeks? No   How does patient/caregiver feel medication is working? Good   Financial problems or insurance changes? No   How many doses of your specialty medications were missed in the last 4 weeks? 0   Would patient like to speak to a pharmacist? No   When does the patient need to receive the medication? 09/19/23   Refill Delivery Questions    How will the patient receive the medication? [facility]   When does the patient need to receive the medication? 09/19/23   Shipping Address Home   Address in [facility] confirmed and updated if neccessary? Yes   Expected Copay ($) 0   Is the patient able to afford the medication copay? Yes   Payment Method zero copay   Days supply of Refill 28   Supplies needed? No supplies needed   Refill activity completed? Yes   Refill activity plan Refill scheduled   Shipment/Pickup Date: 09/14/23            Current Outpatient Medications   Medication Sig    acetaminophen (TYLENOL) 500 MG tablet Take 2 tablets (1,000 mg total) by mouth every 8 (eight) hours.    albuterol (PROVENTIL/VENTOLIN HFA) 90 mcg/actuation inhaler Inhale 2 puffs into the lungs every 6 (six) hours as needed. Rescue    albuterol-ipratropium (DUO-NEB) 2.5 mg-0.5 mg/3 mL nebulizer solution Take 3 mLs by nebulization every 6  (six) hours as needed for Wheezing. Rescue    aspirin 325 MG tablet Take 1 tablet (325 mg total) by mouth once daily.    budesonide-formoterol 160-4.5 mcg (SYMBICORT) 160-4.5 mcg/actuation HFAA Inhale 2 puffs into the lungs every 12 (twelve) hours.    calcium citrate-vitamin D3 315-200 mg (CITRACAL+D) 315 mg-5 mcg (200 unit) per tablet Take 1 tablet by mouth 2 (two) times daily.     cycloSPORINE (RESTASIS) 0.05 % ophthalmic emulsion PLACE 1 DROP INTO BOTH EYES TWICE A DAY    diclofenac sodium (VOLTAREN) 1 % Gel APPLY 2 GRAMS TOPICALLY 4 (FOUR) TIMES DAILY AS NEEDED.    fluconazole (DIFLUCAN) 150 MG Tab Take 150 mg by mouth as needed.    furosemide (LASIX) 20 MG tablet Take 1 tablet (20 mg total) by mouth once daily.    halobetasol propion-tazarotene (DUOBRII) 0.01-0.045 % Lotn aaa on feet and hands qhs  then vaseline and warm towel    INV PROPULSID 10 MG Take 2 tablets (20 mg) by mouth 4 (four) times daily. FOR INVESTIGATIONAL USE ONLY. Protocol CIS-USA-154 Subject ID: [id number].    ketoconazole (NIZORAL) 2 % cream Apply to feet twice daily for 3 weeks    leflunomide (ARAVA) 20 MG Tab Take 1 tablet (20 mg total) by mouth once daily.    lifitegrast (XIIDRA) 5 % Dpet INSTILL ONE DROP INTO BOTH EYES TWICE A DAY    magic mouthwash diphen/antac/lidoc Swish and Spit 5 mL by mouth for 30 seconds twice daily.    methenamine (HIPREX) 1 gram Tab Take 1 tablet (1 g total) by mouth 2 (two) times daily.    methocarbamoL (ROBAXIN) 750 MG Tab Take 1 tablet (750 mg total) by mouth 3 (three) times daily as needed.    mirabegron (MYRBETRIQ) 25 mg Tb24 ER tablet Take 1 tablet (25 mg total) by mouth once daily.    montelukast (SINGULAIR) 10 mg tablet Take 1 tablet (10 mg total) by mouth every evening.    naproxen (NAPROSYN) 500 MG tablet TAKE 1 TABLET (500 MG TOTAL) BY MOUTH 2 (TWO) TIMES DAILY AS NEEDED    omeprazole (PRILOSEC) 40 MG capsule Take 1 capsule (40 mg total) by mouth every morning.    omeprazole-sodium bicarbonate (ZEGERID)  40-1.1 mg-gram per capsule TAKE 1 CAPSULE BY MOUTH EVERY DAY IN THE MORNING    oxyCODONE-acetaminophen (PERCOCET) 5-325 mg per tablet Take 1 tablet by mouth every 6 (six) hours as needed for Pain.    potassium chloride SA (K-DUR,KLOR-CON) 20 MEQ tablet Take 1 tablet (20 mEq total) by mouth once daily. Take with lasix    POTASSIUM ORAL Take by mouth once daily.    predniSONE (DELTASONE) 5 MG tablet TAKE 1 TABLET BY MOUTH EVERY DAY    pregabalin (LYRICA) 150 MG capsule Take 1 capsule (150 mg total) by mouth 3 (three) times daily.    PREMARIN vaginal cream PLACE 0.5 GRAM VAGINALLY 3 (THREE) TIMES A WEEK.    promethazine (PHENERGAN) 25 MG tablet Take 1 tablet (25 mg total) by mouth every 6 (six) hours as needed for Nausea.    promethazine (PHENERGAN) 25 MG tablet Take 1 tablet (25 mg total) by mouth every 4 (four) hours as needed for Nausea.    rosuvastatin (CRESTOR) 20 MG tablet TAKE 1 TABLET BY MOUTH EVERY DAY IN THE EVENING    sarilumab (KEVZARA) 200 mg/1.14 mL Syrg Inject 1.14 mL (200 mg total) into the skin every 14 (fourteen) days.    sucralfate (CARAFATE) 1 gram tablet TAKE 1 TABLET (1 G TOTAL) BY MOUTH 4 (FOUR) TIMES DAILY.    sucralfate (CARAFATE) 1 gram tablet Take 1 tablet (1 g total) by mouth 4 (four) times daily.    triamcinolone acetonide 0.1% (KENALOG) 0.1 % paste Place onto teeth 2 (two) times daily. Apply to oral ulcers twice daily until resolved   Last reviewed on 9/7/2023  1:38 PM by Isiah Steele MD    Review of patient's allergies indicates:   Allergen Reactions    Actemra [tocilizumab] Other (See Comments)     Severe dizziness    Codeine Nausea And Vomiting and Hives    Gold au 198 Hives and Rash    Hydroxychloroquine Other (See Comments)     Can't remember the reaction      Iodinated contrast media Other (See Comments)     Other reaction(s): BURNING ALL OVER    Iodine Other (See Comments) and Hives     Other reaction(s): BURNING ALL OVER - Iodine dye - Not topical    Ondansetron Nausea And  "Vomiting    Sulfa (sulfonamide antibiotics) Other (See Comments)     Can't remember the reaction    Zofran [ondansetron hcl (pf)] Nausea And Vomiting     Pt reports that last time she received zofran she started vomiting again    Methotrexate analogues Nausea Only    Pneumococcal vaccine Other (See Comments)    Pneumovax 23 [pneumococcal 23-argenis ps vaccine] Other (See Comments)     "sick"    Methotrexate Nausea Only    Last reviewed on  9/11/2023 10:46 AM by Krystal Ross      Tasks added this encounter   No tasks added.   Tasks due within next 3 months   9/12/2023 - Refill Coordination Outreach (1 time occurrence)     [facility]  [address]  [facility] [address]  Phone: [phone]  Fax: [phone]        "

## 2023-09-13 ENCOUNTER — TELEPHONE (OUTPATIENT)
Dept: HEPATOLOGY | Facility: CLINIC | Age: 63
End: 2023-09-13
Payer: MEDICARE

## 2023-09-13 NOTE — TELEPHONE ENCOUNTER
----- Message from Micah Arreola sent at 9/13/2023  1:08 PM CDT -----  Regarding: call back  Pt call to reschedule appt requesting call back    CALL

## 2023-09-13 NOTE — TELEPHONE ENCOUNTER
Returned call to patient. Rescheduled appointment per patient request. Mailed appt reminder to patient.

## 2023-09-20 LAB — FUNGUS SPEC CULT: NORMAL

## 2023-09-22 ENCOUNTER — PATIENT MESSAGE (OUTPATIENT)
Dept: ORTHOPEDICS | Facility: CLINIC | Age: 63
End: 2023-09-22
Payer: MEDICARE

## 2023-09-22 DIAGNOSIS — Z98.890 S/P SPINAL SURGERY: ICD-10-CM

## 2023-09-25 ENCOUNTER — HOSPITAL ENCOUNTER (OUTPATIENT)
Facility: OTHER | Age: 63
Discharge: HOME | End: 2023-09-25
Attending: ANESTHESIOLOGY | Admitting: ANESTHESIOLOGY
Payer: MEDICARE

## 2023-09-25 VITALS
BODY MASS INDEX: 25.3 KG/M2 | SYSTOLIC BLOOD PRESSURE: 114 MMHG | WEIGHT: 134 LBS | RESPIRATION RATE: 18 BRPM | HEIGHT: 61 IN | DIASTOLIC BLOOD PRESSURE: 65 MMHG | TEMPERATURE: 99 F | HEART RATE: 83 BPM | OXYGEN SATURATION: 95 %

## 2023-09-25 DIAGNOSIS — M51.36 DDD (DEGENERATIVE DISC DISEASE), LUMBAR: ICD-10-CM

## 2023-09-25 DIAGNOSIS — M54.16 LUMBAR RADICULOPATHY: Primary | ICD-10-CM

## 2023-09-25 DIAGNOSIS — G89.29 CHRONIC PAIN: ICD-10-CM

## 2023-09-25 PROCEDURE — 63600175 PHARM REV CODE 636 W HCPCS: Performed by: ANESTHESIOLOGY

## 2023-09-25 PROCEDURE — 25500020 PHARM REV CODE 255: Performed by: ANESTHESIOLOGY

## 2023-09-25 PROCEDURE — 64483 NJX AA&/STRD TFRM EPI L/S 1: CPT | Mod: 50,,, | Performed by: ANESTHESIOLOGY

## 2023-09-25 PROCEDURE — 25000003 PHARM REV CODE 250: Performed by: ANESTHESIOLOGY

## 2023-09-25 PROCEDURE — 64483 NJX AA&/STRD TFRM EPI L/S 1: CPT | Mod: 50 | Performed by: ANESTHESIOLOGY

## 2023-09-25 PROCEDURE — 64483 PR EPIDURAL INJ, ANES/STEROID, TRANSFORAMINAL, LUMB/SACR, SNGL LEVL: ICD-10-PCS | Mod: 50,,, | Performed by: ANESTHESIOLOGY

## 2023-09-25 RX ORDER — OXYCODONE AND ACETAMINOPHEN 5; 325 MG/1; MG/1
1 TABLET ORAL EVERY 6 HOURS PRN
Qty: 28 TABLET | Refills: 0 | Status: SHIPPED | OUTPATIENT
Start: 2023-09-25 | End: 2023-10-03 | Stop reason: SDUPTHER

## 2023-09-25 RX ORDER — MIDAZOLAM HYDROCHLORIDE 1 MG/ML
INJECTION INTRAMUSCULAR; INTRAVENOUS
Status: DISCONTINUED | OUTPATIENT
Start: 2023-09-25 | End: 2023-09-25 | Stop reason: HOSPADM

## 2023-09-25 RX ORDER — FENTANYL CITRATE 50 UG/ML
INJECTION, SOLUTION INTRAMUSCULAR; INTRAVENOUS
Status: DISCONTINUED | OUTPATIENT
Start: 2023-09-25 | End: 2023-09-25 | Stop reason: HOSPADM

## 2023-09-25 RX ORDER — LIDOCAINE HYDROCHLORIDE 20 MG/ML
INJECTION, SOLUTION INFILTRATION; PERINEURAL
Status: DISCONTINUED | OUTPATIENT
Start: 2023-09-25 | End: 2023-09-25 | Stop reason: HOSPADM

## 2023-09-25 RX ORDER — DEXAMETHASONE SODIUM PHOSPHATE 10 MG/ML
INJECTION INTRAMUSCULAR; INTRAVENOUS
Status: DISCONTINUED | OUTPATIENT
Start: 2023-09-25 | End: 2023-09-25 | Stop reason: HOSPADM

## 2023-09-25 RX ORDER — DIPHENHYDRAMINE HYDROCHLORIDE 50 MG/ML
25 INJECTION INTRAMUSCULAR; INTRAVENOUS ONCE
Status: COMPLETED | OUTPATIENT
Start: 2023-09-25 | End: 2023-09-25

## 2023-09-25 RX ORDER — SODIUM CHLORIDE 9 MG/ML
INJECTION, SOLUTION INTRAVENOUS CONTINUOUS
Status: DISCONTINUED | OUTPATIENT
Start: 2023-09-25 | End: 2023-09-25 | Stop reason: HOSPADM

## 2023-09-25 RX ORDER — LIDOCAINE HYDROCHLORIDE 10 MG/ML
INJECTION, SOLUTION EPIDURAL; INFILTRATION; INTRACAUDAL; PERINEURAL
Status: DISCONTINUED | OUTPATIENT
Start: 2023-09-25 | End: 2023-09-25 | Stop reason: HOSPADM

## 2023-09-25 RX ADMIN — DIPHENHYDRAMINE HYDROCHLORIDE 25 MG: 50 INJECTION, SOLUTION INTRAMUSCULAR; INTRAVENOUS at 07:09

## 2023-09-25 NOTE — DISCHARGE INSTRUCTIONS

## 2023-09-25 NOTE — DISCHARGE SUMMARY
Discharge Note  Short Stay      SUMMARY     Admit Date: 9/25/2023    Attending Physician: Tj Mayfield      Discharge Physician: Tj Mayfield      Discharge Date: 9/25/2023 8:46 AM    Procedure(s) (LRB):  LUMBAR TRANSFORAMINAL BILATERAL  L2/3 DIRECT REFERRAL (Bilateral)    Final Diagnosis: Lumbar radiculopathy [M54.16]    Disposition: Home or self care    Patient Instructions:   Current Discharge Medication List        CONTINUE these medications which have NOT CHANGED    Details   acetaminophen (TYLENOL) 500 MG tablet Take 2 tablets (1,000 mg total) by mouth every 8 (eight) hours.  Qty: 60 tablet, Refills: 0      albuterol (PROVENTIL/VENTOLIN HFA) 90 mcg/actuation inhaler Inhale 2 puffs into the lungs every 6 (six) hours as needed. Rescue  Qty: 20.1 g, Refills: 11      albuterol-ipratropium (DUO-NEB) 2.5 mg-0.5 mg/3 mL nebulizer solution Take 3 mLs by nebulization every 6 (six) hours as needed for Wheezing. Rescue  Qty: 90 mL, Refills: 3    Associated Diagnoses: Moderate persistent asthma with acute exacerbation; Bronchitis      aspirin 325 MG tablet Take 1 tablet (325 mg total) by mouth once daily.  Qty: 30 tablet, Refills: 0    Comments: Bedside delivery to [facility] for discharge on 1/12/23      budesonide-formoterol 160-4.5 mcg (SYMBICORT) 160-4.5 mcg/actuation HFAA Inhale 2 puffs into the lungs every 12 (twelve) hours.  Qty: 30.6 g, Refills: 3    Associated Diagnoses: Chronic asthma, mild intermittent, uncomplicated      calcium citrate-vitamin D3 315-200 mg (CITRACAL+D) 315 mg-5 mcg (200 unit) per tablet Take 1 tablet by mouth 2 (two) times daily.       cycloSPORINE (RESTASIS) 0.05 % ophthalmic emulsion PLACE 1 DROP INTO BOTH EYES TWICE A DAY  Qty: 180 each, Refills: 3    Associated Diagnoses: Bilateral dry eyes      diclofenac sodium (VOLTAREN) 1 % Gel APPLY 2 GRAMS TOPICALLY 4 (FOUR) TIMES DAILY AS NEEDED.  Qty: 300 g, Refills: 2    Comments: DX Code Needed  PT REQUESTED REFILLS.  Associated Diagnoses:  Osteoarthritis, unspecified osteoarthritis type, unspecified site      fluconazole (DIFLUCAN) 150 MG Tab Take 150 mg by mouth as needed.      furosemide (LASIX) 20 MG tablet Take 1 tablet (20 mg total) by mouth once daily.  Qty: 5 tablet, Refills: 0    Associated Diagnoses: Leg edema      halobetasol propion-tazarotene (DUOBRII) 0.01-0.045 % Lotn aaa on feet and hands qhs  then vaseline and warm towel  Qty: 100 g, Refills: 3    Associated Diagnoses: Psoriasiform dermatitis      INV PROPULSID 10 MG Take 2 tablets (20 mg) by mouth 4 (four) times daily. FOR INVESTIGATIONAL USE ONLY. Protocol CIS-USA-154 Subject ID: [id number].  Qty: 1440 each, Refills: 0    Associated Diagnoses: Patient in clinical research study; Gastroparesis      ketoconazole (NIZORAL) 2 % cream Apply to feet twice daily for 3 weeks  Qty: 60 g, Refills: 3    Associated Diagnoses: Tinea pedis of both feet      leflunomide (ARAVA) 20 MG Tab Take 1 tablet (20 mg total) by mouth once daily.  Qty: 90 tablet, Refills: 0    Associated Diagnoses: Rheumatoid arthritis      lifitegrast (XIIDRA) 5 % Dpet INSTILL ONE DROP INTO BOTH EYES TWICE A DAY  Qty: 60 mL, Refills: 10      magic mouthwash diphen/antac/lidoc Swish and Spit 5 mL by mouth for 30 seconds twice daily.  Qty: 150 mL, Refills: 0      methenamine (HIPREX) 1 gram Tab Take 1 tablet (1 g total) by mouth 2 (two) times daily.  Qty: 180 tablet, Refills: 3    Associated Diagnoses: History of recurrent UTIs      methocarbamoL (ROBAXIN) 750 MG Tab Take 1 tablet (750 mg total) by mouth 3 (three) times daily as needed.  Qty: 90 tablet, Refills: 1      mirabegron (MYRBETRIQ) 25 mg Tb24 ER tablet Take 1 tablet (25 mg total) by mouth once daily.  Qty: 90 tablet, Refills: 3    Associated Diagnoses: Urinary urgency; Urge urinary incontinence      montelukast (SINGULAIR) 10 mg tablet Take 1 tablet (10 mg total) by mouth every evening.  Qty: 90 tablet, Refills: 3    Associated Diagnoses: Perennial allergic rhinitis       naproxen (NAPROSYN) 500 MG tablet TAKE 1 TABLET (500 MG TOTAL) BY MOUTH 2 (TWO) TIMES DAILY AS NEEDED  Qty: 180 tablet, Refills: 1      omeprazole (PRILOSEC) 40 MG capsule Take 1 capsule (40 mg total) by mouth every morning.  Qty: 30 capsule, Refills: 6      omeprazole-sodium bicarbonate (ZEGERID) 40-1.1 mg-gram per capsule TAKE 1 CAPSULE BY MOUTH EVERY DAY IN THE MORNING  Qty: 90 capsule, Refills: 3      oxyCODONE-acetaminophen (PERCOCET) 5-325 mg per tablet Take 1 tablet by mouth every 6 (six) hours as needed for Pain.  Qty: 28 tablet, Refills: 0    Comments: Quantity prescribed more than 7 day supply? Yes, quantity medically necessary  Associated Diagnoses: S/P spinal surgery      potassium chloride SA (K-DUR,KLOR-CON) 20 MEQ tablet Take 1 tablet (20 mEq total) by mouth once daily. Take with lasix  Qty: 5 tablet, Refills: 0    Associated Diagnoses: Leg edema      POTASSIUM ORAL Take by mouth once daily.      predniSONE (DELTASONE) 5 MG tablet TAKE 1 TABLET BY MOUTH EVERY DAY  Qty: 90 tablet, Refills: 1      pregabalin (LYRICA) 150 MG capsule Take 1 capsule (150 mg total) by mouth 3 (three) times daily.  Qty: 90 capsule, Refills: 2    Associated Diagnoses: Fibromyalgia      PREMARIN vaginal cream PLACE 0.5 GRAM VAGINALLY 3 (THREE) TIMES A WEEK.  Qty: 30 g, Refills: 1      !! promethazine (PHENERGAN) 25 MG tablet Take 1 tablet (25 mg total) by mouth every 6 (six) hours as needed for Nausea.  Qty: 15 tablet, Refills: 0    Comments: Bedside delivery [facility] surgery 1/11/23 with Dr. Finnegan      !! promethazine (PHENERGAN) 25 MG tablet Take 1 tablet (25 mg total) by mouth every 4 (four) hours as needed for Nausea.  Qty: 30 tablet, Refills: 0    Associated Diagnoses: S/P spinal surgery      rosuvastatin (CRESTOR) 20 MG tablet TAKE 1 TABLET BY MOUTH EVERY DAY IN THE EVENING  Qty: 90 tablet, Refills: 3    Comments: DX Code Needed  PT REQUESTED REFILLS.  Associated Diagnoses: Coronary artery disease involving native  coronary artery of native heart without angina pectoris      sarilumab (KEVZARA) 200 mg/1.14 mL Syrg Inject 1.14 mL (200 mg total) into the skin every 14 (fourteen) days.  Qty: 2.28 mL, Refills: 1    Associated Diagnoses: Rheumatoid arthritis, involving unspecified site, unspecified whether rheumatoid factor present      !! sucralfate (CARAFATE) 1 gram tablet TAKE 1 TABLET (1 G TOTAL) BY MOUTH 4 (FOUR) TIMES DAILY.  Qty: 360 tablet, Refills: 2    Associated Diagnoses: Gastroparesis      !! sucralfate (CARAFATE) 1 gram tablet Take 1 tablet (1 g total) by mouth 4 (four) times daily.  Qty: 360 tablet, Refills: 3    Associated Diagnoses: Gastroparesis      triamcinolone acetonide 0.1% (KENALOG) 0.1 % paste Place onto teeth 2 (two) times daily. Apply to oral ulcers twice daily until resolved  Qty: 5 g, Refills: 1       !! - Potential duplicate medications found. Please discuss with provider.              Discharge Diagnosis: Lumbar radiculopathy [M54.16]  Condition on Discharge: Stable with no complications to procedure   Diet on Discharge: Same as before.  Activity: as per instruction sheet.  Discharge to: Home with a responsible adult.  Follow up: 2-4 weeks       Please call my office or pager at [phone] if experienced any weakness or loss of sensation, fever > 101.5, pain uncontrolled with oral medications, persistent nausea/vomiting/or diarrhea, redness or drainage from the incisions, or any other worrisome concerns. If physician on call was not reached or could not communicate with our office for any reason please go to the nearest emergency department

## 2023-09-25 NOTE — OP NOTE
Lumbar Transforaminal Epidural Steroid Injection under Fluoroscopic Guidance    The procedure, risks, benefits, and options were discussed with the patient. There are no contraindications to the procedure. The patent expressed understanding and agreed to the procedure. Informed written consent was obtained prior to the start of the procedure and can be found in the patient's chart.    PATIENT NAME: Joyce Peterson   MRN: [id number]     DATE OF PROCEDURE: 09/25/2023    PROCEDURE:  Bilateral  L2/3 Lumbar Transforaminal Epidural Steroid Injection under Fluoroscopic Guidance    PRE-OP DIAGNOSIS: Lumbar radiculopathy [M54.16] Lumbar radiculopathy [M54.16]    POST-OP DIAGNOSIS: Same    PHYSICIAN: Tj Mayfield MD    ASSISTANTS: none     MEDICATIONS INJECTED: Preservative-free Decadron 10mg with 5cc of Lidocaine 1% MPF     LOCAL ANESTHETIC INJECTED: Xylocaine 2%     SEDATION: Versed 2mg and Fentanyl 50mcg                                                                                                                                                                                     Conscious sedation ordered by M.D. Patient re-evaluation prior to administration of conscious sedation. No changes noted in patient's status from initial evaluation. The patient's vital signs were monitored by RN and patient remained hemodynamically stable throughout the procedure.    Event Time In   Sedation Start 0845   Sedation End 0852       ESTIMATED BLOOD LOSS: None    COMPLICATIONS: None    TECHNIQUE: Time-out was performed to identify the patient and procedure to be performed. With the patient laying in a prone position, the surgical area was prepped and draped in the usual sterile fashion using ChloraPrep and a fenestrated drape.The levels were determined under fluoroscopy guidance. Skin anesthesia was achieved by injecting Lidocaine 2% over the injection sites. The transforaminal spaces were then approached with a 22 gauge, 3.5 inch  spinal quinke needle that was introduced under fluoroscopic guidance in the AP and Lateral views. Once the needle tip was in the area of the transforaminal space, and there was no blood, CSF or paraesthesias, contrast dye Omnipaque (300mg/mL) was injected to confirm placement and there was no vascular runoff. Fluoroscopic imaging in the AP and lateral views revealed a clear outline of the spinal nerve with proximal spread of agent through the neural foramen into the epidural space. 3 mL of the medication mixture listed above was injected slowly at each site. Displacement of the radio opaque contrast after injection of the medication confirmed that the medication went into the area of the transforaminal spaces. The needles were removed and bleeding was nil. A sterile dressing was applied. No specimens collected. The patient tolerated the procedure well.     PRE-PROCEDURE PAIN SCORE: 6/10    POST-PROCEDURE PAIN SCORE: 0/10    The patient was monitored after the procedure in the recovery area. They were given post-procedure and discharge instructions to follow at home. The patient was discharged in a stable condition.    I reviewed and edited the fellow's note. I conducted my own interview and physical examination. I agree with the findings. I was present and supervising all critical portions of the procedure.    Tj Mayfield MD

## 2023-10-03 ENCOUNTER — PATIENT MESSAGE (OUTPATIENT)
Dept: ORTHOPEDICS | Facility: CLINIC | Age: 63
End: 2023-10-03
Payer: MEDICARE

## 2023-10-03 ENCOUNTER — OFFICE VISIT (OUTPATIENT)
Dept: SPINE | Facility: CLINIC | Age: 63
End: 2023-10-03
Attending: ANESTHESIOLOGY
Payer: MEDICARE

## 2023-10-03 VITALS
DIASTOLIC BLOOD PRESSURE: 62 MMHG | OXYGEN SATURATION: 100 % | HEART RATE: 97 BPM | BODY MASS INDEX: 25.31 KG/M2 | TEMPERATURE: 99 F | SYSTOLIC BLOOD PRESSURE: 129 MMHG | RESPIRATION RATE: 18 BRPM | WEIGHT: 134.06 LBS | HEIGHT: 61 IN

## 2023-10-03 DIAGNOSIS — M25.552 LEFT HIP PAIN: ICD-10-CM

## 2023-10-03 DIAGNOSIS — M47.26 OSTEOARTHRITIS OF SPINE WITH RADICULOPATHY, LUMBAR REGION: Primary | ICD-10-CM

## 2023-10-03 DIAGNOSIS — Z98.890 S/P SPINAL SURGERY: ICD-10-CM

## 2023-10-03 DIAGNOSIS — M96.1 POSTLAMINECTOMY SYNDROME OF LUMBAR REGION: ICD-10-CM

## 2023-10-03 PROCEDURE — 1160F PR REVIEW ALL MEDS BY PRESCRIBER/CLIN PHARMACIST DOCUMENTED: ICD-10-PCS | Mod: CPTII,S$GLB,, | Performed by: ANESTHESIOLOGY

## 2023-10-03 PROCEDURE — 99213 PR OFFICE/OUTPT VISIT, EST, LEVL III, 20-29 MIN: ICD-10-PCS | Mod: GC,S$GLB,, | Performed by: ANESTHESIOLOGY

## 2023-10-03 PROCEDURE — 99999 PR PBB SHADOW E&M-EST. PATIENT-LVL V: ICD-10-PCS | Mod: PBBFAC,,, | Performed by: ANESTHESIOLOGY

## 2023-10-03 PROCEDURE — 99999 PR PBB SHADOW E&M-EST. PATIENT-LVL V: CPT | Mod: PBBFAC,,, | Performed by: ANESTHESIOLOGY

## 2023-10-03 PROCEDURE — 3074F PR MOST RECENT SYSTOLIC BLOOD PRESSURE < 130 MM HG: ICD-10-PCS | Mod: CPTII,S$GLB,, | Performed by: ANESTHESIOLOGY

## 2023-10-03 PROCEDURE — 3078F PR MOST RECENT DIASTOLIC BLOOD PRESSURE < 80 MM HG: ICD-10-PCS | Mod: CPTII,S$GLB,, | Performed by: ANESTHESIOLOGY

## 2023-10-03 PROCEDURE — 3074F SYST BP LT 130 MM HG: CPT | Mod: CPTII,S$GLB,, | Performed by: ANESTHESIOLOGY

## 2023-10-03 PROCEDURE — 3078F DIAST BP <80 MM HG: CPT | Mod: CPTII,S$GLB,, | Performed by: ANESTHESIOLOGY

## 2023-10-03 PROCEDURE — 1159F MED LIST DOCD IN RCRD: CPT | Mod: CPTII,S$GLB,, | Performed by: ANESTHESIOLOGY

## 2023-10-03 PROCEDURE — 1159F PR MEDICATION LIST DOCUMENTED IN MEDICAL RECORD: ICD-10-PCS | Mod: CPTII,S$GLB,, | Performed by: ANESTHESIOLOGY

## 2023-10-03 PROCEDURE — 3008F PR BODY MASS INDEX (BMI) DOCUMENTED: ICD-10-PCS | Mod: CPTII,S$GLB,, | Performed by: ANESTHESIOLOGY

## 2023-10-03 PROCEDURE — 3008F BODY MASS INDEX DOCD: CPT | Mod: CPTII,S$GLB,, | Performed by: ANESTHESIOLOGY

## 2023-10-03 PROCEDURE — 99213 OFFICE O/P EST LOW 20 MIN: CPT | Mod: GC,S$GLB,, | Performed by: ANESTHESIOLOGY

## 2023-10-03 PROCEDURE — 1160F RVW MEDS BY RX/DR IN RCRD: CPT | Mod: CPTII,S$GLB,, | Performed by: ANESTHESIOLOGY

## 2023-10-03 RX ORDER — OXYCODONE AND ACETAMINOPHEN 5; 325 MG/1; MG/1
1 TABLET ORAL EVERY 6 HOURS PRN
Qty: 28 TABLET | Refills: 0 | Status: SHIPPED | OUTPATIENT
Start: 2023-10-03 | End: 2023-11-08 | Stop reason: SDUPTHER

## 2023-10-03 RX ORDER — OXYCODONE AND ACETAMINOPHEN 5; 325 MG/1; MG/1
1 TABLET ORAL EVERY 6 HOURS PRN
Qty: 120 TABLET | Refills: 0 | Status: SHIPPED | OUTPATIENT
Start: 2023-10-03 | End: 2023-11-07 | Stop reason: SDUPTHER

## 2023-10-03 NOTE — PROGRESS NOTES
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 sacroiliitis or marrow replacing lesion. Small subserosal fibroid at the uterine fundus.  No other visceral pelvic soft tissue abnormality.     RIGHT HIP:     BONE: No fracture, osteonecrosis, or marrow replacing lesion.     JOINT: Mild degenerative changes with small regions of cartilage loss and small marginal osteophytes.  Small joint effusion/synovitis.  No loose bodies.     LABRUM: Nondisplaced anterosuperior labral tear (6:21 and 7:20).     TENDONS: The iliopsoas and rectus femoris tendons are intact.  Partial tear of the gluteus minimus and medius tendons at the greater trochanter attachment.  Small volume greater trochanteric bursal fluid.  Subtotal tear of the right proximal hamstring tendon at the ischial tuberosity attachment.     SOFT TISSUES: Fatty atrophy gluteus minimus and medius.  Narrowing of the ischiofemoral space, measuring 1.2 cm, with edema and partial tear of the quadratus femoris muscle (7:7).  The sciatic nerve is unremarkable.     Impression:     NO FRACTURE.     Mild right hip degenerative changes.     Anterosuperior labral tear.     Partial tears of the gluteus minimus and medius tendons, with greater trochanteric bursitis.     Subtotal tear of the proximal hamstring tendon.     Signs of ischiofemoral impingement.    Allergies:   Review of patient's allergies indicates:   Allergen Reactions    Actemra [tocilizumab] Other (See Comments)      "Severe dizziness    Codeine Nausea And Vomiting and Hives    Gold au 198 Hives and Rash    Hydroxychloroquine Other (See Comments)     Can't remember the reaction      Iodinated contrast media Other (See Comments)     Other reaction(s): BURNING ALL OVER    Iodine Other (See Comments) and Hives     Other reaction(s): BURNING ALL OVER - Iodine dye - Not topical    Ondansetron Nausea And Vomiting    Sulfa (sulfonamide antibiotics) Other (See Comments)     Can't remember the reaction    Zofran [ondansetron hcl (pf)] Nausea And Vomiting     Pt reports that last time she received zofran she started vomiting again    Methotrexate analogues Nausea Only    Pneumococcal vaccine Other (See Comments)    Pneumovax 23 [pneumococcal 23-argenis ps vaccine] Other (See Comments)     "sick"    Methotrexate Nausea Only       Current Medications:   Current Outpatient Medications   Medication Sig Dispense Refill    acetaminophen (TYLENOL) 500 MG tablet Take 2 tablets (1,000 mg total) by mouth every 8 (eight) hours. 60 tablet 0    albuterol (PROVENTIL/VENTOLIN HFA) 90 mcg/actuation inhaler Inhale 2 puffs into the lungs every 6 (six) hours as needed. Rescue 20.1 g 11    budesonide-formoterol 160-4.5 mcg (SYMBICORT) 160-4.5 mcg/actuation HFAA Inhale 2 puffs into the lungs every 12 (twelve) hours. 30.6 g 3    calcium citrate-vitamin D3 315-200 mg (CITRACAL+D) 315 mg-5 mcg (200 unit) per tablet Take 1 tablet by mouth 2 (two) times daily.       cycloSPORINE (RESTASIS) 0.05 % ophthalmic emulsion PLACE 1 DROP INTO BOTH EYES TWICE A  each 3    diclofenac sodium (VOLTAREN) 1 % Gel APPLY 2 GRAMS TOPICALLY 4 (FOUR) TIMES DAILY AS NEEDED. 300 g 2    doxycycline (VIBRA-TABS) 100 MG tablet Take 1 tablet (100 mg total) by mouth 2 (two) times daily. for 14 days 28 tablet 1    fluconazole (DIFLUCAN) 150 MG Tab Take 150 mg by mouth as needed.      furosemide (LASIX) 20 MG tablet Take 1 tablet (20 mg total) by mouth once daily. 5 tablet 0    halobetasol " propion-tazarotene (DUOBRII) 0.01-0.045 % Lotn aaa on feet and hands qhs  then vaseline and warm towel 100 g 3    INV PROPULSID 10 MG Take 2 tablets (20 mg) by mouth 4 (four) times daily. FOR INVESTIGATIONAL USE ONLY. Protocol [id number] Subject ID: [id number]. 1440 each 0    ketoconazole (NIZORAL) 2 % cream Apply to feet twice daily for 3 weeks 60 g 3    leflunomide (ARAVA) 20 MG Tab Take 1 tablet (20 mg total) by mouth once daily. 90 tablet 0    lifitegrast (XIIDRA) 5 % Dpet INSTILL ONE DROP INTO BOTH EYES TWICE A DAY 60 mL 10    magic mouthwash diphen/antac/lidoc Swish and Spit 5 mL by mouth for 30 seconds twice daily. 150 mL 0    methenamine (HIPREX) 1 gram Tab Take 1 tablet (1 g total) by mouth 2 (two) times daily. 180 tablet 3    methocarbamoL (ROBAXIN) 750 MG Tab Take 1 tablet (750 mg total) by mouth 3 (three) times daily as needed. 90 tablet 1    mirabegron (MYRBETRIQ) 25 mg Tb24 ER tablet Take 1 tablet (25 mg total) by mouth once daily. 90 tablet 3    montelukast (SINGULAIR) 10 mg tablet Take 1 tablet (10 mg total) by mouth every evening. 90 tablet 3    naproxen (NAPROSYN) 500 MG tablet TAKE 1 TABLET (500 MG TOTAL) BY MOUTH 2 (TWO) TIMES DAILY AS NEEDED 180 tablet 1    omeprazole (PRILOSEC) 40 MG capsule Take 1 capsule (40 mg total) by mouth every morning. 30 capsule 6    omeprazole-sodium bicarbonate (ZEGERID) 40-1.1 mg-gram per capsule TAKE 1 CAPSULE BY MOUTH EVERY DAY IN THE MORNING 90 capsule 3    oxyCODONE-acetaminophen (PERCOCET) 5-325 mg per tablet Take 1 tablet by mouth every 6 (six) hours as needed for Pain. 28 tablet 0    potassium chloride SA (K-DUR,KLOR-CON) 20 MEQ tablet Take 1 tablet (20 mEq total) by mouth once daily. Take with lasix 5 tablet 0    POTASSIUM ORAL Take by mouth once daily.      predniSONE (DELTASONE) 5 MG tablet TAKE 1 TABLET BY MOUTH EVERY DAY 90 tablet 1    pregabalin (LYRICA) 150 MG capsule Take 1 capsule (150 mg total) by mouth 3 (three) times daily. 90 capsule 2    PREMARIN  vaginal cream PLACE 0.5 GRAM VAGINALLY 3 (THREE) TIMES A WEEK. 30 g 1    promethazine (PHENERGAN) 25 MG tablet Take 1 tablet (25 mg total) by mouth every 6 (six) hours as needed for Nausea. 15 tablet 0    promethazine (PHENERGAN) 25 MG tablet Take 1 tablet (25 mg total) by mouth every 4 (four) hours as needed for Nausea. 30 tablet 0    rosuvastatin (CRESTOR) 20 MG tablet TAKE 1 TABLET BY MOUTH EVERY DAY IN THE EVENING 90 tablet 3    sarilumab (KEVZARA) 200 mg/1.14 mL Syrg Inject 1.14 mL (200 mg total) into the skin every 14 (fourteen) days. 2.28 mL 1    sucralfate (CARAFATE) 1 gram tablet TAKE 1 TABLET (1 G TOTAL) BY MOUTH 4 (FOUR) TIMES DAILY. 360 tablet 2    sucralfate (CARAFATE) 1 gram tablet Take 1 tablet (1 g total) by mouth 4 (four) times daily. 360 tablet 3    triamcinolone acetonide 0.1% (KENALOG) 0.1 % paste Place onto teeth 2 (two) times daily. Apply to oral ulcers twice daily until resolved 5 g 1    albuterol-ipratropium (DUO-NEB) 2.5 mg-0.5 mg/3 mL nebulizer solution Take 3 mLs by nebulization every 6 (six) hours as needed for Wheezing. Rescue 90 mL 3    aspirin 325 MG tablet Take 1 tablet (325 mg total) by mouth once daily. 30 tablet 0    oxyCODONE-acetaminophen (PERCOCET) 5-325 mg per tablet Take 1 tablet by mouth every 6 (six) hours as needed for Pain. 120 tablet 0     No current facility-administered medications for this visit.       REVIEW OF SYSTEMS:    GENERAL:  No weight loss, malaise or fevers.  HEENT:  Negative for frequent or significant headaches.  NECK:  Negative for lumps, goiter, pain and significant neck swelling.  RESPIRATORY:  Negative for cough, wheezing or shortness of breath. Asthma.  CARDIOVASCULAR:  Negative for chest pain, leg swelling or palpitations. CAD.  GI:  Negative for abdominal discomfort, blood in stools or black stools or change in bowel habits.  MUSCULOSKELETAL:  See HPI.  SKIN:  Negative for lesions, rash, and itching.  PSYCH:  Negative for sleep disturbance, mood  disorder and recent psychosocial stressors.  HEMATOLOGY/LYMPHOLOGY:  Negative for prolonged bleeding, bruising easily or swollen nodes.  NEURO:   No history of headaches, syncope, paralysis, seizures or tremors.  All other reviewed and negative other than HPI.    Past Medical History:  Past Medical History:   Diagnosis Date    Acid reflux     Allergy     Anemia     Asthma     Coronary artery disease     CS (cervical spondylosis) 03/08/2013    Degenerative disc disease     Degenerative joint disease of hindfoot, left 6/28/2022    Dry eyes     Dry mouth     Gastroparesis     History of methotrexate therapy 01/19/2022    Hyperlipidemia     Lateral meniscus derangement 04/06/2016    Lobular carcinoma in situ     Lumbar spondylosis 03/08/2013    Osteoarthritis     Osteoporosis     Pes planovalgus, acquired, left 6/28/2022    Rheumatoid arthritis(714.0)     Rupture of left triceps tendon 10/17/2018    Umbilical hernia 08/13/2015       Past Surgical History:  Past Surgical History:   Procedure Laterality Date    BREAST BIOPSY Left 01/29/2002    core bx    CARPAL TUNNEL RELEASE Right 05/2017    CHOLECYSTECTOMY  2004    COLONOSCOPY      10/11    COLONOSCOPY N/A 6/29/2017    Procedure: COLONOSCOPY;  Surgeon: López Moore MD;  [facility]  Service: Endoscopy;  Laterality: N/A;    EPIDURAL STEROID INJECTION N/A 11/18/2021    Procedure: INJECTION, STEROID, EPIDURAL, L5-S1 IL NEED CONSENT;  Surgeon: Tj Mayfield MD;  Location: [facility] PAIN MGT;  Service: Pain Management;  Laterality: N/A;    EPIDURAL STEROID INJECTION N/A 9/29/2022    Procedure: LUMBAR L3/L4 IL MADELINE CONTRAST;  Surgeon: Tj Mayfield MD;  Location: [facility] PAIN MGT;  Service: Pain Management;  Laterality: N/A;    EPIDURAL STEROID INJECTION N/A 12/12/2022    Procedure: INJECTION, STEROID, EPIDURAL L5/S1 IL CONTRAST;  Surgeon: Tj Mayfield MD;  Location: [facility] PAIN MGT;  Service: Pain Management;  Laterality: N/A;    EPIDURAL STEROID INJECTION N/A  4/6/2023    Procedure: INJECTION, STEROID, EPIDURAL L5/S1;  Surgeon: Tj Mayfield MD;  Location: [facility] PAIN MGT;  Service: Pain Management;  Laterality: N/A;    FOOT ARTHRODESIS Left 1/11/2023    Procedure: FUSION, FOOT;  Surgeon: Ariella Finnegan MD;  Location: [facility] OR;  Service: Orthopedics;  Laterality: Left;  nimbus    FUSION, SPINE, LUMBAR, TLIF, POSTERIOR APPROACH, USING PEDICLE SCREW N/A 6/22/2023    Procedure: FUSION, SPINE, LUMBAR, TLIF, POSTERIOR APPROACH, USING PEDICLE SCREW L4/5 spinewave SNS:SSEP/EMG;  Surgeon: Jh Mosqueda MD;  Location: [facility] OR [address] [place] [address];  Service: Neurosurgery;  Laterality: N/A;    HARVESTING OF BONE GRAFT Left 1/11/2023    Procedure: SURGICAL PROCUREMENT, BONE GRAFT;  Surgeon: Ariella Finnegan MD;  Location: [facility] OR;  Service: Orthopedics;  Laterality: Left;    INJECTION OF ANESTHETIC AGENT AROUND NERVE Bilateral 8/23/2021    Procedure: BLOCK, NERVE, MEDIAL BRANCH L3,L4,L5;  Surgeon: Tj Mayfield MD;  Location: [facility] PAIN MGT;  Service: Pain Management;  Laterality: Bilateral;  1 of 2    INJECTION OF ANESTHETIC AGENT AROUND NERVE Bilateral 8/26/2021    Procedure: BLOCK, NERVE, MEDIAL BRANCH L3,L4,L5;  Surgeon: Tj Mayfield MD;  Location: [facility] PAIN MGT;  Service: Pain Management;  Laterality: Bilateral;  2 of 2    INJECTION OF ANESTHETIC AGENT AROUND NERVE Left 7/7/2022    Procedure: BLOCK, NERVE, LEFT OBTURATOR AND FEMORAL;  Surgeon: Tj Mayfield MD;  Location: [facility] PAIN MGT;  Service: Pain Management;  Laterality: Left;    INJECTION OF FACET JOINT Bilateral 3/12/2020    Procedure: FACET JOINT INJECTION (LUMBAR BLOCK) BILATERAL L4-5 AND L5-S1 DIRECT REFERRAL;  Surgeon: Tj Mayfield MD;  Location: [facility] PAIN MGT;  Service: Pain Management;  Laterality: Bilateral;  NEEDS CONSENT    INJECTION OF FACET JOINT Bilateral 3/29/2021    Procedure: INJECTION, FACET JOINT L3/4, L4/5, L5/S1;  Surgeon: Tj Mayfield MD;  Location: [facility] PAIN MGT;  Service: Pain Management;  Laterality: Bilateral;     INJECTION OF JOINT Right 7/30/2020    Procedure: INJECTION, JOINT, RIGHT HIP Interarticular under flouro;  Surgeon: Tj Mayfield MD;  Location: [facility] PAIN MGT;  Service: Pain Management;  Laterality: Right;  INJECTION, JOINT, RIGHT HIP Interarticular under flouro    INJECTION OF JOINT Right 2/21/2022    Procedure: Injection, Joint RIGHT ISCHIAL BURSA;  Surgeon: Tj Mayfield MD;  Location: [facility] PAIN MGT;  Service: Pain Management;  Laterality: Right;    INTRAMEDULLARY RODDING OF FEMUR Left 5/21/2019    Procedure: INSERTION, INTRAMEDULLARY ARIEL, FEMUR;  Surgeon: López Duff MD;  Location: [address] FLR;  Service: Orthopedics;  Laterality: Left;    LENGTHENING OF TENDON OF LOWER EXTREMITY Left 1/11/2023    Procedure: LENGTHENING, TENDON, LOWER EXTREMITY;  Surgeon: Ariella Finnegan MD;  Location: [facility] OR;  Service: Orthopedics;  Laterality: Left;    MAGNETIC RESONANCE IMAGING N/A 5/29/2023    Procedure: MRI (Magnetic Resonance Imagine);  Surgeon: Sujey Robin;  Location: [facility] SU[facility]Y;  Service: Anesthesiology;  Laterality: N/A;    RADIOFREQUENCY ABLATION Left 9/20/2021    Procedure: RADIOFREQUENCY ABLATION left L3,4,5 RFA  1 of 2  CONSENT NEEDED;  Surgeon: Tj Mayfield MD;  Location: [facility] PAIN MGT;  Service: Pain Management;  Laterality: Left;    RADIOFREQUENCY ABLATION Right 10/4/2021    Procedure: RADIOFREQUENCY ABLATION  right L3,4,5 RFA   2 of 2  CONSENT NEEDED;  Surgeon: Tj Mayfield MD;  Location: [facility] PAIN MGT;  Service: Pain Management;  Laterality: Right;    RADIOFREQUENCY ABLATION Left 4/21/2022    Procedure: Radiofrequency Ablation LEFT L3,L4,L5;  Surgeon: Tj Mayfield MD;  Location: [facility] PAIN MGT;  Service: Pain Management;  Laterality: Left;    RADIOFREQUENCY ABLATION Right 5/12/2022    Procedure: Radiofrequency Ablation RIGHT L3,L4,L5;  Surgeon: Tj Mayfield MD;  Location: [facility] PAIN MGT;  Service: Pain Management;  Laterality: Right;    RADIOFREQUENCY ABLATION Left 7/25/2022    Procedure: Radiofrequency  Ablation Left Femoral & Obturator;  Surgeon: Tj Mayfield MD;  Location: [facility] PAIN MGT;  Service: Pain Management;  Laterality: Left;    REPAIR OF TRICEPS TENDON Left 10/17/2018    Procedure: REPAIR, TENDON, TRICEPS left elbow;  Surgeon: Staci Yarbrough MD;  Location: [facility] OR 1ST FLR;  Service: Orthopedics;  Laterality: Left;  Anesthesia: General and regional. PRONE, k-wire , hand pan 1 and pan 2, CALL ARTHREX/Tamera notified 10-12 LO    TONSILLECTOMY      TRANSFORAMINAL EPIDURAL INJECTION OF STEROID Right 8/31/2020    Procedure: INJECTION, STEROID, EPIDURAL, TRANSFORAMINAL,  APPROACH, L3-L4 and L4-L5 need consent;  Surgeon: Tj Mayfield MD;  Location: [facility] PAIN MGT;  Service: Pain Management;  Laterality: Right;    TRANSFORAMINAL EPIDURAL INJECTION OF STEROID Bilateral 7/23/2021    Procedure: INJECTION, STEROID, EPIDURAL, TRANSFORAMINAL APPROACH L4/5;  Surgeon: Tj Mayfield MD;  Location: [facility] PAIN MGT;  Service: Pain Management;  Laterality: Bilateral;    TRANSFORAMINAL EPIDURAL INJECTION OF STEROID Bilateral 9/25/2023    Procedure: LUMBAR TRANSFORAMINAL BILATERAL  L2/3 DIRECT REFERRAL;  Surgeon: Tj Mayfield MD;  Location: [facility] PAIN MGT;  Service: Pain Management;  Laterality: Bilateral;    TRIGGER POINT INJECTION N/A 7/23/2021    Procedure: INJECTION, TRIGGER POINT SCAPULAR;  Surgeon: Tj Mayfield MD;  Location: [facility] PAIN MGT;  Service: Pain Management;  Laterality: N/A;    TUBAL LIGATION  2003    UPPER GASTROINTESTINAL ENDOSCOPY      10/11    uterine ablation  2003       Family History:  Family History   Problem Relation Age of Onset    Cancer Mother         Lung Cancer    Emphysema Mother     Heart attack Mother     Alcohol abuse Mother     Cancer Father         Lung Cancer    Osteoarthritis Father     Lung cancer Father     Skin cancer Father     Alcohol abuse Father     Heart disease Brother     Heart attack Brother     Alcohol abuse Brother     Cirrhosis Brother     Osteoarthritis Paternal  Aunt     Retinal detachment Maternal Aunt     No Known Problems Sister     No Known Problems Maternal Uncle     No Known Problems Paternal Uncle     No Known Problems Maternal Grandmother     No Known Problems Maternal Grandfather     No Known Problems Paternal Grandmother     No Known Problems Paternal Grandfather     Colon cancer Neg Hx     Esophageal cancer Neg Hx     Stomach cancer Neg Hx     Celiac disease Neg Hx     Diabetes Neg Hx     Thyroid disease Neg Hx     Amblyopia Neg Hx     Blindness Neg Hx     Cataracts Neg Hx     Glaucoma Neg Hx     Hypertension Neg Hx     Macular degeneration Neg Hx     Strabismus Neg Hx     Stroke Neg Hx        Social History:  Social History     Socioeconomic History    Marital status:    Tobacco Use    Smoking status: Never    Smokeless tobacco: Never   Substance and Sexual Activity    Alcohol use: Yes     Comment: 2-3 times per year.    Drug use: No    Sexual activity: Yes     Partners: Male     Birth control/protection: Surgical     Social Determinants of Health     Financial Resource Strain: Low Risk  (9/6/2023)    Overall Financial Resource Strain (CARDIA)     Difficulty of Paying Living Expenses: Not hard at all   Food Insecurity: No Food Insecurity (9/6/2023)    Hunger Vital Sign     Worried About Running Out of Food in the Last Year: Never true     Ran Out of Food in the Last Year: Never true   Transportation Needs: No Transportation Needs (9/6/2023)    PRAPARE - Transportation     Lack of Transportation (Medical): No     Lack of Transportation (Non-Medical): No   Physical Activity: Inactive (9/6/2023)    Exercise Vital Sign     Days of Exercise per Week: 0 days     Minutes of Exercise per Session: 0 min   Stress: No Stress Concern Present (9/6/2023)    [employer] [place] [employer] - Occupational Stress Questionnaire     Feeling of Stress : Not at all   Social Connections: Socially Integrated (9/6/2023)    Social Connection and Isolation Panel [NHANES]      Frequency of Communication with Friends and Family: Twice a week     Frequency of Social Gatherings with Friends and Family: More than three times a week     Attends Confucianism Services: More than 4 times per year     Active Member of Clubs or Organizations: Yes     Attends Club or Organization Meetings: More than 4 times per year     Marital Status:    Housing Stability: Low Risk  (9/6/2023)    Housing Stability Vital Sign     Unable to Pay for Housing in the Last Year: No     Number of Places Lived in the Last Year: 1     Unstable Housing in the Last Year: No       OBJECTIVE:  Vitals:    10/03/23 1336   BP: 129/62   Pulse: 97   Resp: 18   Temp: 99 °F (37.2 °C)     PHYSICAL EXAM:   GENERAL: Well appearing, in no acute distress, alert and oriented x3.  PSYCH:  Mood and affect appropriate.  SKIN: Skin color, texture, turgor normal, no rashes or lesions.  HEENT:  Normocephalic, atraumatic. Cranial nerves grossly intact.  PULM: No evidence of respiratory difficulty, symmetric chest rise.  GI:  Non-distended  BACK: ROM reduced in extension 2/2 pain. There is tenderness over the lumbosacral junction and lower lumbar facet joints. SLR negative. Femoral nerve stretch negative. Surgical incision healing well.   EXTREMITIES: No deformities, edema, or skin discoloration.   MUSCULOSKELETAL: SANDRA, FADIR, log roll negative. Bilateral upper and lower extremity strength is normal and symmetric. No atrophy is noted.  NEURO: Sensation is equal and appropriate bilaterally. Bilateral upper and lower extremity coordination and muscle stretch reflexes are physiologic and symmetric. No clonus.  GAIT: Antalgic, using straight cane for ambulation.      ASSESSMENT: 63 y.o. year old female with lower back and hip pain, consistent with the following diagnoses:     1. Osteoarthritis of spine with radiculopathy, lumbar region  oxyCODONE-acetaminophen (PERCOCET) 5-325 mg per tablet    Procedure Order to Pain Management      2.  Postlaminectomy syndrome of lumbar region  oxyCODONE-acetaminophen (PERCOCET) 5-325 mg per tablet    Procedure Order to Pain Management      3. Left hip pain  X-Ray Hip 2 or 3 views Left (with Pelvis when performed)          PLAN:    - XR Left hip  - Rx Percocet 5-325 mg 4 times daily as needed for pain, and we will plan to wean her down over the next 2-3 months.  - Schedule for Left L3/4 and L4/5 TFESI  - RTC 2-4 weeks after procedure      The above plan and management options were discussed at length with patient. Patient is in agreement with the above and verbalized understanding.      J Luis Liu MD  [facility] Fellow, PGY-5          This encounter took at least 4o minutes spent in chart review, history, physical, image, assessment and plan discussion.    I have personally taken the history and examined this patient and agree with the fellow's note as stated above.

## 2023-10-04 DIAGNOSIS — M47.26 OSTEOARTHRITIS OF SPINE WITH RADICULOPATHY, LUMBAR REGION: Primary | ICD-10-CM

## 2023-10-05 ENCOUNTER — PATIENT MESSAGE (OUTPATIENT)
Dept: ORTHOPEDICS | Facility: CLINIC | Age: 63
End: 2023-10-05
Payer: MEDICARE

## 2023-10-05 ENCOUNTER — TELEPHONE (OUTPATIENT)
Dept: PAIN MEDICINE | Facility: OTHER | Age: 63
End: 2023-10-05
Payer: MEDICARE

## 2023-10-05 NOTE — TELEPHONE ENCOUNTER
----- Message from Justin Wesley NP sent at 10/5/2023 12:44 PM CDT -----  Regarding: RE: Request to hold Aspirin 325 mg  Per Dr. Jacob, she is to hold her aspirin for 5 days.  ----- Message -----  From: Weston Jacob MD  Sent: 10/5/2023  12:09 PM CDT  To: Justin Wesley NP  Subject: RE: Request to hold Aspirin 325 mg               She may hold aspirin for 5 days. Michelle Jacob  ----- Message -----  From: Justin Wesley NP  Sent: 10/5/2023  10:46 AM CDT  To: Weston Jacob MD; Brigida Ramos LPN  Subject: RE: Request to hold Aspirin 325 mg               This advice should come from her cardiologist.  ----- Message -----  From: Brigida Ramos LPN  Sent: 10/4/2023   8:56 PM CDT  To: Krystal Singleton DO  Subject: Request to hold Aspirin 325 mg                   Good afternoon,    Patient is scheduled to have a procedure with Dr. Mayfield on 10/18/23. Staff is requesting to hold Aspirin 325 mg for 5 days prior to procedure. Please advise.    Thank you,  Brigida

## 2023-10-06 ENCOUNTER — PATIENT MESSAGE (OUTPATIENT)
Dept: ORTHOPEDIC SURGERY | Facility: CLINIC | Age: 63
End: 2023-10-06
Payer: MEDICARE

## 2023-10-09 ENCOUNTER — PATIENT MESSAGE (OUTPATIENT)
Dept: PAIN MEDICINE | Facility: OTHER | Age: 63
End: 2023-10-09
Payer: MEDICARE

## 2023-10-11 ENCOUNTER — OFFICE VISIT (OUTPATIENT)
Dept: PAIN MEDICINE | Facility: CLINIC | Age: 63
End: 2023-10-11
Attending: ANESTHESIOLOGY
Payer: MEDICARE

## 2023-10-11 DIAGNOSIS — M25.552 CHRONIC LEFT HIP PAIN: ICD-10-CM

## 2023-10-11 DIAGNOSIS — M47.26 OSTEOARTHRITIS OF SPINE WITH RADICULOPATHY, LUMBAR REGION: Primary | ICD-10-CM

## 2023-10-11 DIAGNOSIS — Z87.81 S/P LEFT HIP FRACTURE: ICD-10-CM

## 2023-10-11 DIAGNOSIS — G89.29 CHRONIC LEFT HIP PAIN: ICD-10-CM

## 2023-10-11 PROCEDURE — 1160F RVW MEDS BY RX/DR IN RCRD: CPT | Mod: CPTII,95,, | Performed by: ANESTHESIOLOGY

## 2023-10-11 PROCEDURE — 1160F PR REVIEW ALL MEDS BY PRESCRIBER/CLIN PHARMACIST DOCUMENTED: ICD-10-PCS | Mod: CPTII,95,, | Performed by: ANESTHESIOLOGY

## 2023-10-11 PROCEDURE — 99213 PR OFFICE/OUTPT VISIT, EST, LEVL III, 20-29 MIN: ICD-10-PCS | Mod: 95,,, | Performed by: ANESTHESIOLOGY

## 2023-10-11 PROCEDURE — 1159F PR MEDICATION LIST DOCUMENTED IN MEDICAL RECORD: ICD-10-PCS | Mod: CPTII,95,, | Performed by: ANESTHESIOLOGY

## 2023-10-11 PROCEDURE — 1159F MED LIST DOCD IN RCRD: CPT | Mod: CPTII,95,, | Performed by: ANESTHESIOLOGY

## 2023-10-11 PROCEDURE — 99213 OFFICE O/P EST LOW 20 MIN: CPT | Mod: 95,,, | Performed by: ANESTHESIOLOGY

## 2023-10-11 NOTE — H&P (VIEW-ONLY)
The patient location is: home  The chief complaint leading to consultation is: LLE pain    Visit type: audiovisual    Face to Face time with patient: 10  20 minutes of total time spent on the encounter, which includes face to face time and non-face to face time preparing to see the patient (eg, review of tests), Obtaining and/or reviewing separately obtained history, Documenting clinical information in the electronic or other health record, Independently interpreting results (not separately reported) and communicating results to the patient/family/caregiver, or Care coordination (not separately reported).         Each patient to whom he or she provides medical services by telemedicine is:  (1) informed of the relationship between the physician and patient and the respective role of any other health care provider with respect to management of the patient; and (2) notified that he or she may decline to receive medical services by telemedicine and may withdraw from such care at any time.    Notes:    1. Osteoarthritis of spine with radiculopathy, lumbar region        2. Chronic left hip pain        3. S/p left hip fracture         She is scheduled for left L3-4 and L4-5 transforaminal epidural steroid injections.  She had a hip x-ray that I reviewed with the patient on this virtual visit.  I showed her soft tissue ossification around the open reduction internal fixation of the left hip at the trochanter.  There was also some degenerative changes at the left sacroiliac joint.  Her pain radiates all the way down to her left anterolateral leg to the ankle.  Previous MRI also reviewed with the patient that showed the L3 for right facet joint cyst with resultant stenosis of the central canal.  This is in association with ligamentum flavum thickening and bilateral facet arthritis.  L4-5 has spondylolisthesis and status post instrumentation.  We will proceed with the transforaminal epidural as that may be the source of her  Your BMI is Body mass index is 33.91 kg/m .    What is BMI?  Body mass index (BMI) is one way to tell whether you are at a healthy weight, overweight, or obese. It measures your weight in relation to your height.  A BMI of 18.5 to 24.9 is in the healthy range. A person with a BMI of 25 to 29.9 is considered overweight, and someone with a BMI of 30 or greater is considered obese.  Another way to find out if you are at risk for health problems caused by overweight and obesity is to measure your waist. If you are a woman and your waist is more than 35 inches, or if you are a man and your waist is more than 40 inches, your risk of disease may be higher.  More than two-thirds of American adults are considered overweight or obese. Being overweight or obese increases the risk for further weight gain.  Excess weight may lead to heart disease and diabetes. Creating and following plans for healthy eating and physical activity may help you improve your health.    Methods for maintaining or losing weight.  Weight control is part of healthy lifestyle and includes exercise, emotional health, and healthy eating habits.  Careful eating habits lifelong is the mainstay of weight control.  Though there are significant health benefits from weight loss, long-term weight loss with diet alone may be very difficult to achieve- studies show long-term success with dietary management in less than 10% of people. Attaining a healthy weight may be especially difficult to achieve in those with severe obesity. In some cases, medications, devices and surgical management might be considered.    What can you do?  If you are overweight or obese and are interested in methods for weight loss, you should discuss this with your provider. In addition, we recommend that you review healthy life styles and methods for weight loss available through the [employer] patient information sites:    http://win.niddk.nih.gov/publications/index.htm    Equipment Instructions    We will process your PAP order and send it to a Durable Medical Equipment (DME) provider.    The medical equipment company should call you within 7 days.  If you have not heard from the company, please contact them to see if they received your order and are planning to call you.    Please call us at [phone] if you are unable to contact the medical equipment company or if they do not have the order.    If you are starting a new PAP machine, please call us after you use it the first night to let us know how it went. This call also helps us know that you received your equipment and that everything is ready. Please use our central phone number [phone]    Contact information for [employer] company:    Contact [facility] Tel: [phone]           pain less likely the hip joint itself.  The hip joint might be contributing to the anterior left thigh pain but not the distal radicular symptoms distal to the knee.

## 2023-10-11 NOTE — PROGRESS NOTES
The patient location is: home  The chief complaint leading to consultation is: LLE pain    Visit type: audiovisual    Face to Face time with patient: 10  20 minutes of total time spent on the encounter, which includes face to face time and non-face to face time preparing to see the patient (eg, review of tests), Obtaining and/or reviewing separately obtained history, Documenting clinical information in the electronic or other health record, Independently interpreting results (not separately reported) and communicating results to the patient/family/caregiver, or Care coordination (not separately reported).         Each patient to whom he or she provides medical services by telemedicine is:  (1) informed of the relationship between the physician and patient and the respective role of any other health care provider with respect to management of the patient; and (2) notified that he or she may decline to receive medical services by telemedicine and may withdraw from such care at any time.    Notes:    1. Osteoarthritis of spine with radiculopathy, lumbar region        2. Chronic left hip pain        3. S/p left hip fracture         She is scheduled for left L3-4 and L4-5 transforaminal epidural steroid injections.  She had a hip x-ray that I reviewed with the patient on this virtual visit.  I showed her soft tissue ossification around the open reduction internal fixation of the left hip at the trochanter.  There was also some degenerative changes at the left sacroiliac joint.  Her pain radiates all the way down to her left anterolateral leg to the ankle.  Previous MRI also reviewed with the patient that showed the L3 for right facet joint cyst with resultant stenosis of the central canal.  This is in association with ligamentum flavum thickening and bilateral facet arthritis.  L4-5 has spondylolisthesis and status post instrumentation.  We will proceed with the transforaminal epidural as that may be the source of her  pain less likely the hip joint itself.  The hip joint might be contributing to the anterior left thigh pain but not the distal radicular symptoms distal to the knee.

## 2023-10-13 ENCOUNTER — PATIENT MESSAGE (OUTPATIENT)
Dept: ORTHOPEDICS | Facility: CLINIC | Age: 63
End: 2023-10-13
Payer: MEDICARE

## 2023-10-16 ENCOUNTER — PATIENT MESSAGE (OUTPATIENT)
Dept: PODIATRY | Facility: CLINIC | Age: 63
End: 2023-10-16
Payer: MEDICARE

## 2023-10-18 ENCOUNTER — HOSPITAL ENCOUNTER (OUTPATIENT)
Facility: OTHER | Age: 63
Discharge: HOME | End: 2023-10-18
Attending: ANESTHESIOLOGY | Admitting: ANESTHESIOLOGY
Payer: MEDICARE

## 2023-10-18 ENCOUNTER — PATIENT MESSAGE (OUTPATIENT)
Dept: FAMILY MEDICINE | Facility: CLINIC | Age: 63
End: 2023-10-18
Payer: MEDICARE

## 2023-10-18 VITALS
BODY MASS INDEX: 24.92 KG/M2 | SYSTOLIC BLOOD PRESSURE: 116 MMHG | DIASTOLIC BLOOD PRESSURE: 61 MMHG | HEIGHT: 61 IN | OXYGEN SATURATION: 94 % | HEART RATE: 78 BPM | WEIGHT: 132 LBS | RESPIRATION RATE: 16 BRPM | TEMPERATURE: 98 F

## 2023-10-18 DIAGNOSIS — G89.29 CHRONIC PAIN: ICD-10-CM

## 2023-10-18 DIAGNOSIS — M54.16 LUMBAR RADICULOPATHY: Primary | ICD-10-CM

## 2023-10-18 PROCEDURE — 64483 NJX AA&/STRD TFRM EPI L/S 1: CPT | Mod: LT | Performed by: ANESTHESIOLOGY

## 2023-10-18 PROCEDURE — 64484 NJX AA&/STRD TFRM EPI L/S EA: CPT | Mod: LT | Performed by: ANESTHESIOLOGY

## 2023-10-18 PROCEDURE — 64484 PRA INJECT ANES/STEROID FORAMEN LUMBAR/SACRAL W IMG GUIDE ,EA ADD LEVEL: ICD-10-PCS | Mod: LT,,, | Performed by: ANESTHESIOLOGY

## 2023-10-18 PROCEDURE — 63600175 PHARM REV CODE 636 W HCPCS: Performed by: ANESTHESIOLOGY

## 2023-10-18 PROCEDURE — 25500020 PHARM REV CODE 255: Performed by: ANESTHESIOLOGY

## 2023-10-18 PROCEDURE — 64483 PR EPIDURAL INJ, ANES/STEROID, TRANSFORAMINAL, LUMB/SACR, SNGL LEVL: ICD-10-PCS | Mod: LT,,, | Performed by: ANESTHESIOLOGY

## 2023-10-18 PROCEDURE — 64484 NJX AA&/STRD TFRM EPI L/S EA: CPT | Mod: LT,,, | Performed by: ANESTHESIOLOGY

## 2023-10-18 PROCEDURE — 25000003 PHARM REV CODE 250: Performed by: ANESTHESIOLOGY

## 2023-10-18 PROCEDURE — 64483 NJX AA&/STRD TFRM EPI L/S 1: CPT | Mod: LT,,, | Performed by: ANESTHESIOLOGY

## 2023-10-18 RX ORDER — DEXAMETHASONE SODIUM PHOSPHATE 10 MG/ML
INJECTION INTRAMUSCULAR; INTRAVENOUS
Status: DISCONTINUED | OUTPATIENT
Start: 2023-10-18 | End: 2023-10-18 | Stop reason: HOSPADM

## 2023-10-18 RX ORDER — FENTANYL CITRATE 50 UG/ML
INJECTION, SOLUTION INTRAMUSCULAR; INTRAVENOUS
Status: DISCONTINUED | OUTPATIENT
Start: 2023-10-18 | End: 2023-10-18 | Stop reason: HOSPADM

## 2023-10-18 RX ORDER — SODIUM CHLORIDE 9 MG/ML
INJECTION, SOLUTION INTRAVENOUS
Status: COMPLETED | OUTPATIENT
Start: 2023-10-18 | End: 2023-10-18

## 2023-10-18 RX ORDER — LIDOCAINE HYDROCHLORIDE 20 MG/ML
INJECTION, SOLUTION INFILTRATION; PERINEURAL
Status: DISCONTINUED | OUTPATIENT
Start: 2023-10-18 | End: 2023-10-18 | Stop reason: HOSPADM

## 2023-10-18 RX ORDER — MUPIROCIN 20 MG/G
OINTMENT TOPICAL 3 TIMES DAILY
Qty: 22 G | Refills: 0 | Status: SHIPPED | OUTPATIENT
Start: 2023-10-18 | End: 2023-11-07

## 2023-10-18 RX ORDER — MIDAZOLAM HYDROCHLORIDE 1 MG/ML
INJECTION INTRAMUSCULAR; INTRAVENOUS
Status: DISCONTINUED | OUTPATIENT
Start: 2023-10-18 | End: 2023-10-18 | Stop reason: HOSPADM

## 2023-10-18 RX ORDER — DIPHENHYDRAMINE HYDROCHLORIDE 50 MG/ML
25 INJECTION INTRAMUSCULAR; INTRAVENOUS ONCE
Status: COMPLETED | OUTPATIENT
Start: 2023-10-18 | End: 2023-10-18

## 2023-10-18 RX ORDER — LIDOCAINE HYDROCHLORIDE 10 MG/ML
INJECTION, SOLUTION EPIDURAL; INFILTRATION; INTRACAUDAL; PERINEURAL
Status: DISCONTINUED | OUTPATIENT
Start: 2023-10-18 | End: 2023-10-18 | Stop reason: HOSPADM

## 2023-10-18 RX ADMIN — DIPHENHYDRAMINE HYDROCHLORIDE 25 MG: 50 INJECTION, SOLUTION INTRAMUSCULAR; INTRAVENOUS at 11:10

## 2023-10-18 NOTE — OP NOTE
Lumbar Transforaminal Epidural Steroid Injection under Fluoroscopic Guidance    The procedure, risks, benefits, and options were discussed with the patient. There are no contraindications to the procedure. The patent expressed understanding and agreed to the procedure. Informed written consent was obtained prior to the start of the procedure and can be found in the patient's chart.    PATIENT NAME: Joyce Peterson   MRN: [id number]     DATE OF PROCEDURE: 10/18/2023    PROCEDURE:  Left  L3/4 and L4/5 Lumbar Transforaminal Epidural Steroid Injection under Fluoroscopic Guidance    PRE-OP DIAGNOSIS: Osteoarthritis of spine with radiculopathy, lumbar region [M47.26] Lumbar radiculopathy [M54.16]    POST-OP DIAGNOSIS: Same    PHYSICIAN: Tj Mayfield MD    MEDICATIONS INJECTED: Preservative-free Decadron 10mg with 5cc of Lidocaine 1% MPF     LOCAL ANESTHETIC INJECTED: Xylocaine 2%     SEDATION: Versed 2mg and Fentanyl 25mcg                                                                                                                                                                                     Conscious sedation ordered by M.D. Patient re-evaluation prior to administration of conscious sedation. No changes noted in patient's status from initial evaluation. The patient's vital signs were monitored by RN and patient remained hemodynamically stable throughout the procedure.    Event Time In   Sedation Start 1203   Sedation End 1208       ESTIMATED BLOOD LOSS: None    COMPLICATIONS: None    TECHNIQUE: Time-out was performed to identify the patient and procedure to be performed. With the patient laying in a prone position, the surgical area was prepped and draped in the usual sterile fashion using ChloraPrep and a fenestrated drape.The levels were determined under fluoroscopy guidance. Skin anesthesia was achieved by injecting Lidocaine 2% over the injection sites. The transforaminal spaces were then approached with a 22  gauge, 3.5 inch spinal quinke needle that was introduced under fluoroscopic guidance in the AP and Lateral views. Once the needle tip was in the area of the transforaminal space, and there was no blood, CSF or paraesthesias, contrast dye Omnipaque (300mg/mL) was injected to confirm placement and there was no vascular runoff. Fluoroscopic imaging in the AP and lateral views revealed a clear outline of the spinal nerve with proximal spread of agent through the neural foramen into the epidural space. 3 mL of the medication mixture listed above was injected slowly at each site. Displacement of the radio opaque contrast after injection of the medication confirmed that the medication went into the area of the transforaminal spaces. The needles were removed and bleeding was nil. A sterile dressing was applied. No specimens collected. The patient tolerated the procedure well.     PRE-PROCEDURE PAIN SCORE: 8/10    POST-PROCEDURE PAIN SCORE: 4/10    The patient was monitored after the procedure in the recovery area. They were given post-procedure and discharge instructions to follow at home. The patient was discharged in a stable condition.    Tam Skinner DO    I reviewed and edited the fellow's note. I conducted my own interview and physical examination. I agree with the findings. I was present and supervising all critical portions of the procedure.

## 2023-10-18 NOTE — DISCHARGE INSTRUCTIONS

## 2023-10-18 NOTE — DISCHARGE SUMMARY
Discharge Note  Short Stay      SUMMARY     Admit Date: 10/18/2023    Attending Physician: Tj Mayfield MD      Discharge Physician: Tam Skinner      Discharge Date: 10/18/2023 12:03 PM    Procedure(s) (LRB):  LUMBAR TRANSFORAMINAL LEFT L3/4 AND L4/5 * CLEARANCE IN CHART* (Left)    Final Diagnosis: Osteoarthritis of spine with radiculopathy, lumbar region [M47.26]    Disposition: Home or self care    Patient Instructions:   Current Discharge Medication List        CONTINUE these medications which have NOT CHANGED    Details   acetaminophen (TYLENOL) 500 MG tablet Take 2 tablets (1,000 mg total) by mouth every 8 (eight) hours.  Qty: 60 tablet, Refills: 0      albuterol (PROVENTIL/VENTOLIN HFA) 90 mcg/actuation inhaler Inhale 2 puffs into the lungs every 6 (six) hours as needed. Rescue  Qty: 20.1 g, Refills: 11      albuterol-ipratropium (DUO-NEB) 2.5 mg-0.5 mg/3 mL nebulizer solution Take 3 mLs by nebulization every 6 (six) hours as needed for Wheezing. Rescue  Qty: 90 mL, Refills: 3    Associated Diagnoses: Moderate persistent asthma with acute exacerbation; Bronchitis      aspirin 325 MG tablet Take 1 tablet (325 mg total) by mouth once daily.  Qty: 30 tablet, Refills: 0    Comments: Bedside delivery to [place] for discharge on 1/12/23      budesonide-formoterol 160-4.5 mcg (SYMBICORT) 160-4.5 mcg/actuation HFAA Inhale 2 puffs into the lungs every 12 (twelve) hours.  Qty: 30.6 g, Refills: 3    Associated Diagnoses: Chronic asthma, mild intermittent, uncomplicated      calcium citrate-vitamin D3 315-200 mg (CITRACAL+D) 315 mg-5 mcg (200 unit) per tablet Take 1 tablet by mouth 2 (two) times daily.       cycloSPORINE (RESTASIS) 0.05 % ophthalmic emulsion PLACE 1 DROP INTO BOTH EYES TWICE A DAY  Qty: 180 each, Refills: 3    Associated Diagnoses: Bilateral dry eyes      diclofenac sodium (VOLTAREN) 1 % Gel APPLY 2 GRAMS TOPICALLY 4 (FOUR) TIMES DAILY AS NEEDED.  Qty: 300 g, Refills: 2    Comments: DX Code  Needed  PT REQUESTED REFILLS.  Associated Diagnoses: Osteoarthritis, unspecified osteoarthritis type, unspecified site      fluconazole (DIFLUCAN) 150 MG Tab Take 150 mg by mouth as needed.      furosemide (LASIX) 20 MG tablet Take 1 tablet (20 mg total) by mouth once daily.  Qty: 5 tablet, Refills: 0    Associated Diagnoses: Leg edema      halobetasol propion-tazarotene (DUOBRII) 0.01-0.045 % Lotn aaa on feet and hands qhs  then vaseline and warm towel  Qty: 100 g, Refills: 3    Associated Diagnoses: Psoriasiform dermatitis      INV PROPULSID 10 MG Take 2 tablets (20 mg) by mouth 4 (four) times daily. FOR INVESTIGATIONAL USE ONLY. Protocol CIS-USA-154 Subject ID: [id number].  Qty: 1440 each, Refills: 0    Associated Diagnoses: Patient in clinical research study; Gastroparesis      ketoconazole (NIZORAL) 2 % cream Apply to feet twice daily for 3 weeks  Qty: 60 g, Refills: 3    Associated Diagnoses: Tinea pedis of both feet      leflunomide (ARAVA) 20 MG Tab Take 1 tablet (20 mg total) by mouth once daily.  Qty: 90 tablet, Refills: 0    Associated Diagnoses: Rheumatoid arthritis      lifitegrast (XIIDRA) 5 % Dpet INSTILL ONE DROP INTO BOTH EYES TWICE A DAY  Qty: 60 mL, Refills: 10      magic mouthwash diphen/antac/lidoc Swish and Spit 5 mL by mouth for 30 seconds twice daily.  Qty: 150 mL, Refills: 0      methenamine (HIPREX) 1 gram Tab Take 1 tablet (1 g total) by mouth 2 (two) times daily.  Qty: 180 tablet, Refills: 3    Associated Diagnoses: History of recurrent UTIs      methocarbamoL (ROBAXIN) 750 MG Tab Take 1 tablet (750 mg total) by mouth 3 (three) times daily as needed.  Qty: 90 tablet, Refills: 1      mirabegron (MYRBETRIQ) 25 mg Tb24 ER tablet Take 1 tablet (25 mg total) by mouth once daily.  Qty: 90 tablet, Refills: 3    Associated Diagnoses: Urinary urgency; Urge urinary incontinence      montelukast (SINGULAIR) 10 mg tablet Take 1 tablet (10 mg total) by mouth every evening.  Qty: 90 tablet, Refills: 3     Associated Diagnoses: Perennial allergic rhinitis      naproxen (NAPROSYN) 500 MG tablet TAKE 1 TABLET (500 MG TOTAL) BY MOUTH 2 (TWO) TIMES DAILY AS NEEDED  Qty: 180 tablet, Refills: 1      omeprazole (PRILOSEC) 40 MG capsule Take 1 capsule (40 mg total) by mouth every morning.  Qty: 30 capsule, Refills: 6      omeprazole-sodium bicarbonate (ZEGERID) 40-1.1 mg-gram per capsule TAKE 1 CAPSULE BY MOUTH EVERY DAY IN THE MORNING  Qty: 90 capsule, Refills: 3      !! oxyCODONE-acetaminophen (PERCOCET) 5-325 mg per tablet Take 1 tablet by mouth every 6 (six) hours as needed for Pain.  Qty: 28 tablet, Refills: 0    Comments: Quantity prescribed more than 7 day supply? Yes, quantity medically necessary  Associated Diagnoses: S/P spinal surgery      !! oxyCODONE-acetaminophen (PERCOCET) 5-325 mg per tablet Take 1 tablet by mouth every 6 (six) hours as needed for Pain.  Qty: 120 tablet, Refills: 0    Comments: Quantity prescribed more than 7 day supply? Yes, quantity medically necessary  Associated Diagnoses: Postlaminectomy syndrome of lumbar region; Osteoarthritis of spine with radiculopathy, lumbar region      potassium chloride SA (K-DUR,KLOR-CON) 20 MEQ tablet Take 1 tablet (20 mEq total) by mouth once daily. Take with lasix  Qty: 5 tablet, Refills: 0    Associated Diagnoses: Leg edema      POTASSIUM ORAL Take by mouth once daily.      predniSONE (DELTASONE) 5 MG tablet TAKE 1 TABLET BY MOUTH EVERY DAY  Qty: 90 tablet, Refills: 1      pregabalin (LYRICA) 150 MG capsule Take 1 capsule (150 mg total) by mouth 3 (three) times daily.  Qty: 90 capsule, Refills: 2    Associated Diagnoses: Fibromyalgia      PREMARIN vaginal cream PLACE 0.5 GRAM VAGINALLY 3 (THREE) TIMES A WEEK.  Qty: 30 g, Refills: 1      !! promethazine (PHENERGAN) 25 MG tablet Take 1 tablet (25 mg total) by mouth every 6 (six) hours as needed for Nausea.  Qty: 15 tablet, Refills: 0    Comments: Bedside delivery [place] [facility] 1/11/23 with Dr. Finnegan      !!  promethazine (PHENERGAN) 25 MG tablet Take 1 tablet (25 mg total) by mouth every 4 (four) hours as needed for Nausea.  Qty: 30 tablet, Refills: 0    Associated Diagnoses: S/P spinal surgery      !! rosuvastatin (CRESTOR) 20 MG tablet TAKE 1 TABLET BY MOUTH EVERY DAY IN THE EVENING  Qty: 90 tablet, Refills: 3    Comments: DX Code Needed  PT REQUESTED REFILLS.  Associated Diagnoses: Coronary artery disease involving native coronary artery of native heart without angina pectoris      !! rosuvastatin (CRESTOR) 20 MG tablet Take 1 tablet by mouth every day in the evening  Qty: 90 tablet, Refills: 3      sarilumab (KEVZARA) 200 mg/1.14 mL Syrg Inject 1.14 mL (200 mg total) into the skin every 14 (fourteen) days.  Qty: 2.28 mL, Refills: 1    Associated Diagnoses: Rheumatoid arthritis, involving unspecified site, unspecified whether rheumatoid factor present      !! sucralfate (CARAFATE) 1 gram tablet TAKE 1 TABLET (1 G TOTAL) BY MOUTH 4 (FOUR) TIMES DAILY.  Qty: 360 tablet, Refills: 2    Associated Diagnoses: Gastroparesis      !! sucralfate (CARAFATE) 1 gram tablet Take 1 tablet (1 g total) by mouth 4 (four) times daily.  Qty: 360 tablet, Refills: 3    Associated Diagnoses: Gastroparesis       !! - Potential duplicate medications found. Please discuss with provider.              Discharge Diagnosis: Osteoarthritis of spine with radiculopathy, lumbar region [M47.26]  Condition on Discharge: Stable with no complications to procedure   Diet on Discharge: Same as before.  Activity: as per instruction sheet.  Discharge to: Home with a responsible adult.  Follow up: 2-4 weeks       Please call my office or pager at [phone] if experienced any weakness or loss of sensation, fever > 101.5, pain uncontrolled with oral medications, persistent nausea/vomiting/or diarrhea, redness or drainage from the incisions, or any other worrisome concerns. If physician on call was not reached or could not communicate with our office for any  reason please go to the nearest emergency department

## 2023-10-19 ENCOUNTER — OFFICE VISIT (OUTPATIENT)
Dept: PODIATRY | Facility: CLINIC | Age: 63
End: 2023-10-19
Payer: MEDICARE

## 2023-10-19 VITALS — BODY MASS INDEX: 24.55 KG/M2 | WEIGHT: 130.06 LBS | HEIGHT: 61 IN

## 2023-10-19 DIAGNOSIS — M20.41 HAMMER TOE OF RIGHT FOOT: ICD-10-CM

## 2023-10-19 DIAGNOSIS — L97.511 ULCER OF RIGHT FOOT, LIMITED TO BREAKDOWN OF SKIN: Primary | ICD-10-CM

## 2023-10-19 DIAGNOSIS — M05.79 RHEUMATOID ARTHRITIS INVOLVING MULTIPLE SITES WITH POSITIVE RHEUMATOID FACTOR: ICD-10-CM

## 2023-10-19 PROCEDURE — 3008F BODY MASS INDEX DOCD: CPT | Mod: CPTII,S$GLB,, | Performed by: PODIATRIST

## 2023-10-19 PROCEDURE — 1159F PR MEDICATION LIST DOCUMENTED IN MEDICAL RECORD: ICD-10-PCS | Mod: CPTII,S$GLB,, | Performed by: PODIATRIST

## 2023-10-19 PROCEDURE — 99999 PR PBB SHADOW E&M-EST. PATIENT-LVL IV: CPT | Mod: PBBFAC,,, | Performed by: PODIATRIST

## 2023-10-19 PROCEDURE — 1160F RVW MEDS BY RX/DR IN RCRD: CPT | Mod: CPTII,S$GLB,, | Performed by: PODIATRIST

## 2023-10-19 PROCEDURE — 99999 PR PBB SHADOW E&M-EST. PATIENT-LVL IV: ICD-10-PCS | Mod: PBBFAC,,, | Performed by: PODIATRIST

## 2023-10-19 PROCEDURE — 99213 PR OFFICE/OUTPT VISIT, EST, LEVL III, 20-29 MIN: ICD-10-PCS | Mod: S$GLB,,, | Performed by: PODIATRIST

## 2023-10-19 PROCEDURE — 99213 OFFICE O/P EST LOW 20 MIN: CPT | Mod: S$GLB,,, | Performed by: PODIATRIST

## 2023-10-19 PROCEDURE — 1159F MED LIST DOCD IN RCRD: CPT | Mod: CPTII,S$GLB,, | Performed by: PODIATRIST

## 2023-10-19 PROCEDURE — 1160F PR REVIEW ALL MEDS BY PRESCRIBER/CLIN PHARMACIST DOCUMENTED: ICD-10-PCS | Mod: CPTII,S$GLB,, | Performed by: PODIATRIST

## 2023-10-19 PROCEDURE — 3008F PR BODY MASS INDEX (BMI) DOCUMENTED: ICD-10-PCS | Mod: CPTII,S$GLB,, | Performed by: PODIATRIST

## 2023-10-19 NOTE — PROGRESS NOTES
[facility] - PODIATRY  [address] [place] [address], [address]  [facility] [address]  Dept: [phone]  Dept Fax: [phone]    Isiah Mark Jr., DPM     Assessment:   MDM    Coding  1. Ulcer of right foot, limited to breakdown of skin        2. Rheumatoid arthritis involving multiple sites with positive rheumatoid factor        3. Hammer toe of right foot            Plan:     Procedures  Joyce was seen today for foot ulcer.    Diagnoses and all orders for this visit:    Ulcer of right foot, limited to breakdown of skin    Rheumatoid arthritis involving multiple sites with positive rheumatoid factor    Hammer toe of right foot        -pt seen, evaluated, and managed  -dx discussed in detail. All questions/concerns addressed  -all tx options discussed. All alternatives, risks, benefits of all txs discussed  -the patient was educated about the diagnosis  -clinically a R 3rd toe pressure ulceration from RA deformity of toe  -ulceration without s/s of infection, stable/improving  -betadine DSD applied. Pt to change QD at home    -rxs dispensed: none  -referrals: none  -WB: wbat    Short-term goals include but are not limited to: maintaining good offloading and minimizing bioburden, promoting granulation and epithelialization to healing.  Wound healing is a medically reasonable expectation based on the clinical circumstances documented.    Long-term goals include but are not limited to: keeping the wound healed by good offloading and medical management under the direction of internist.    Advised pt of risks of current condition including but not limited to: worsening of infection, potential for limb loss    Follow-up: Patient is to return to the clinic in 4 week for follow-up but [facility] if any signs of infection, such as fever, chills, sweats, increased redness or pain.    Follow up in about 4 weeks (around 11/16/2023).    Subjective:      Patient ID: Joyce Peterson is a 63  y.o. female.    Chief Complaint:   Chief Complaint   Patient presents with    Foot Ulcer     3rd toe        CC - foot ulcer: Patient presents to the clinic for evaluation and treatment of high risk feet. The patient's chief complaint is foot ulcer, R foot. duration: several wks. denies n/v/f/c.    Of note - she is s/p left triple arthrodesis, midfoot arthrodesis, ARLEN, CBG on 1/11/23 w/ dr. Finnegan. Overall she is very happy with result of L foot reconstruction.      10/19/23:  Hx as above. F/u for ulceration R foot. Was lost to f/u since 5/9/23.    Foot Ulcer      Last Podiatry Enc: Visit date not found  Last Enc w/ Me: Visit date not found    Outside reports reviewed: historical medical records.  Family hx: as below  Past Medical History:   Diagnosis Date    Acid reflux     Allergy     Anemia     Asthma     Coronary artery disease     CS (cervical spondylosis) 03/08/2013    Degenerative disc disease     Degenerative joint disease of hindfoot, left 6/28/2022    Dry eyes     Dry mouth     Gastroparesis     History of methotrexate therapy 01/19/2022    Hyperlipidemia     Lateral meniscus derangement 04/06/2016    Lobular carcinoma in situ     Lumbar spondylosis 03/08/2013    Osteoarthritis     Osteoporosis     Pes planovalgus, acquired, left 6/28/2022    Rheumatoid arthritis(714.0)     Rupture of left triceps tendon 10/17/2018    Umbilical hernia 08/13/2015     Past Surgical History:   Procedure Laterality Date    BREAST BIOPSY Left 01/29/2002    core bx    CARPAL TUNNEL RELEASE Right 05/2017    CHOLECYSTECTOMY  2004    COLONOSCOPY      10/11    COLONOSCOPY N/A 6/29/2017    Procedure: COLONOSCOPY;  Surgeon: López Moore MD;  Location: [facility] ENDO ([address] [place] [address]);  Service: Endoscopy;  Laterality: N/A;    EPIDURAL STEROID INJECTION N/A 11/18/2021    Procedure: INJECTION, STEROID, EPIDURAL, L5-S1 IL NEED CONSENT;  Surgeon: Tj Mayfield MD;  Location: [facility] PAIN MGT;  Service: Pain Management;  Laterality: N/A;    EPIDURAL  STEROID INJECTION N/A 9/29/2022    Procedure: LUMBAR L3/L4 IL MADELINE CONTRAST;  Surgeon: Tj Mayfield MD;  Location: [facility] PAIN MGT;  Service: Pain Management;  Laterality: N/A;    EPIDURAL STEROID INJECTION N/A 12/12/2022    Procedure: INJECTION, STEROID, EPIDURAL L5/S1 IL CONTRAST;  Surgeon: Tj Mayfield MD;  Location: [facility] PAIN [facility]GT;  Service: Pain Management;  Laterality: N/A;    EPIDURAL STEROID INJECTION N/A 4/6/2023    Procedure: INJECTION, STEROID, EPIDURAL L5/S1;  Surgeon: Tj Mayfield MD;  Location: [facility] PAIN MGT;  Service: Pain Management;  Laterality: N/A;    FOOT ARTHRODESIS Left 1/11/2023    Procedure: FUSION, FOOT;  Surgeon: Ariella Finnegan MD;  Location: [facility] OR;  Service: Orthopedics;  Laterality: Left;  [facility]    FUSION, SPINE, LUMBAR, TLIF, POSTERIOR APPROACH, USING PEDICLE SCREW N/A 6/22/2023    Procedure: FUSION, SPINE, LUMBAR, TLIF, POSTERIOR APPROACH, USING PEDICLE SCREW L4/5 spinewave SNS:SSEP/EMG;  Surgeon: Jh Mosqueda MD;  Location: [facility] OR [address] [place] [address];  Service: Neurosurgery;  Laterality: N/A;    HARVESTING OF BONE GRAFT Left 1/11/2023    Procedure: SURGICAL PROCUREMENT, BONE GRAFT;  Surgeon: Ariella Finnegan MD;  Location: [facility] OR;  Service: Orthopedics;  Laterality: Left;    INJECTION OF ANESTHETIC AGENT AROUND NERVE Bilateral 8/23/2021    Procedure: BLOCK, NERVE, MEDIAL BRANCH L3,L4,L5;  Surgeon: Tj Mayfield MD;  Location: [facility] PAIN MGT;  Service: Pain Management;  Laterality: Bilateral;  1 of 2    INJECTION OF ANESTHETIC AGENT AROUND NERVE Bilateral 8/26/2021    Procedure: BLOCK, NERVE, MEDIAL BRANCH L3,L4,L5;  Surgeon: Tj Mayfield MD;  Location: [facility] PAIN MGT;  Service: Pain Management;  Laterality: Bilateral;  2 of 2    INJECTION OF ANESTHETIC AGENT AROUND NERVE Left 7/7/2022    Procedure: BLOCK, NERVE, LEFT OBTURATOR AND FEMORAL;  Surgeon: Tj Mayfield MD;  Location: [facility] PAIN MGT;  Service: Pain Management;  Laterality: Left;    INJECTION OF FACET JOINT Bilateral 3/12/2020     Procedure: FACET JOINT INJECTION (LUMBAR BLOCK) BILATERAL L4-5 AND L5-S1 DIRECT REFERRAL;  Surgeon: Tj Mayfield MD;  Location: [facility] PAIN MGT;  Service: Pain Management;  Laterality: Bilateral;  NEEDS CONSENT    INJECTION OF FACET JOINT Bilateral 3/29/2021    Procedure: INJECTION, FACET JOINT L3/4, L4/5, L5/S1;  Surgeon: Tj Mayfield MD;  Location: [facility] PAIN MGT;  Service: Pain Management;  Laterality: Bilateral;    INJECTION OF JOINT Right 7/30/2020    Procedure: INJECTION, JOINT, RIGHT HIP Interarticular under flouro;  Surgeon: Tj Mayfield MD;  Location: [facility];  Service: Pain Management;  Laterality: Right;  INJECTION, JOINT, RIGHT HIP Interarticular under flouro    INJECTION OF JOINT Right 2/21/2022    Procedure: Injection, Joint RIGHT ISCHIAL BURSA;  Surgeon: Tj Mayfield MD;  Location: [facility] PAIN MGT;  Service: Pain Management;  Laterality: Right;    INTRAMEDULLARY RODDING OF FEMUR Left 5/21/2019    Procedure: INSERTION, INTRAMEDULLARY ARIEL, FEMUR;  Surgeon: López Duff MD;  [address]  Service: Orthopedics;  Laterality: Left;    LENGTHENING OF TENDON OF LOWER EXTREMITY Left 1/11/2023    Procedure: LENGTHENING, TENDON, LOWER EXTREMITY;  Surgeon: Ariella Finnegan MD;  Location: [facility];  Service: Orthopedics;  Laterality: Left;    MAGNETIC RESONANCE IMAGING N/A 5/29/2023    Procedure: MRI (Magnetic Resonance Imagine);  Surgeon: Sujey Robin;  Location: [facility];  Service: Anesthesiology;  Laterality: N/A;    RADIOFREQUENCY ABLATION Left 9/20/2021    Procedure: RADIOFREQUENCY ABLATION left L3,4,5 RFA  1 of 2  CONSENT NEEDED;  Surgeon: Tj Mayfield MD;  Location: [facility] PAIN MGT;  Service: Pain Management;  Laterality: Left;    RADIOFREQUENCY ABLATION Right 10/4/2021    Procedure: RADIOFREQUENCY ABLATION  right L3,4,5 RFA   2 of 2  CONSENT NEEDED;  Surgeon: Tj Mayfield MD;  Location: [facility] PAIN MGT;  Service: Pain Management;  Laterality: Right;    RADIOFREQUENCY ABLATION Left 4/21/2022     Procedure: Radiofrequency Ablation LEFT L3,L4,L5;  Surgeon: Tj Mayfield MD;  Location: [facility];  Service: Pain Management;  Laterality: Left;    RADIOFREQUENCY ABLATION Right 5/12/2022    Procedure: Radiofrequency Ablation RIGHT L3,L4,L5;  Surgeon: Tj Mayfield MD;  Location: [facility];  Service: Pain Management;  Laterality: Right;    RADIOFREQUENCY ABLATION Left 7/25/2022    Procedure: Radiofrequency Ablation Left Femoral & Obturator;  Surgeon: Tj Mayfield MD;  Location: [facility];  Service: Pain Management;  Laterality: Left;    REPAIR OF TRICEPS TENDON Left 10/17/2018    Procedure: REPAIR, TENDON, TRICEPS left elbow;  Surgeon: Staci Yarbrough MD;  Location: [facility] OR [address] [place] [address];  Service: Orthopedics;  Laterality: Left;  Anesthesia: General and regional. PRONE, k-wire , hand pan 1 and pan 2, CALL ARTHREX/Tamera notified 10-12 LO    TONSILLECTOMY      TRANSFORAMINAL EPIDURAL INJECTION OF STEROID Right 8/31/2020    Procedure: INJECTION, STEROID, EPIDURAL, TRANSFORAMINAL,  APPROACH, L3-L4 and L4-L5 need consent;  Surgeon: Tj Mayfield MD;  Location: [facility];  Service: Pain Management;  Laterality: Right;    TRANSFORAMINAL EPIDURAL INJECTION OF STEROID Bilateral 7/23/2021    Procedure: INJECTION, STEROID, EPIDURAL, TRANSFORAMINAL APPROACH L4/5;  Surgeon: Tj Mayfield MD;  Location: [facility];  Service: Pain Management;  Laterality: Bilateral;    TRANSFORAMINAL EPIDURAL INJECTION OF STEROID Bilateral 9/25/2023    Procedure: LUMBAR TRANSFORAMINAL BILATERAL  L2/3 DIRECT REFERRAL;  Surgeon: Tj Mayfield MD;  Location: [facility];  Service: Pain Management;  Laterality: Bilateral;    TRANSFORAMINAL EPIDURAL INJECTION OF STEROID Left 10/18/2023    Procedure: LUMBAR TRANSFORAMINAL LEFT L3/4 AND L4/5 * CLEARANCE IN CHART*;  Surgeon: Tj Mayfield MD;  Location: [facility];  Service: Pain Management;  Laterality: Left;    TRIGGER POINT INJECTION N/A 7/23/2021    Procedure:  INJECTION, TRIGGER POINT SCAPULAR;  Surgeon: Tj Mayfield MD;  Location: [facility];  Service: Pain Management;  Laterality: N/A;    TUBAL LIGATION  2003    UPPER GASTROINTESTINAL ENDOSCOPY      10/11    uterine ablation  2003     Family History   Problem Relation Age of Onset    Cancer Mother         Lung Cancer    Emphysema Mother     Heart attack Mother     Alcohol abuse Mother     Cancer Father         Lung Cancer    Osteoarthritis Father     Lung cancer Father     Skin cancer Father     Alcohol abuse Father     Heart disease Brother     Heart attack Brother     Alcohol abuse Brother     Cirrhosis Brother     Osteoarthritis Paternal Aunt     Retinal detachment Maternal Aunt     No Known Problems Sister     No Known Problems Maternal Uncle     No Known Problems Paternal Uncle     No Known Problems Maternal Grandmother     No Known Problems Maternal Grandfather     No Known Problems Paternal Grandmother     No Known Problems Paternal Grandfather     Colon cancer Neg Hx     Esophageal cancer Neg Hx     Stomach cancer Neg Hx     Celiac disease Neg Hx     Diabetes Neg Hx     Thyroid disease Neg Hx     Amblyopia Neg Hx     Blindness Neg Hx     Cataracts Neg Hx     Glaucoma Neg Hx     Hypertension Neg Hx     Macular degeneration Neg Hx     Strabismus Neg Hx     Stroke Neg Hx      Current Outpatient Medications   Medication Sig Dispense Refill    albuterol (PROVENTIL/VENTOLIN HFA) 90 mcg/actuation inhaler Inhale 2 puffs into the lungs every 6 (six) hours as needed. Rescue 20.1 g 11    budesonide-formoterol 160-4.5 mcg (SYMBICORT) 160-4.5 mcg/actuation HFAA Inhale 2 puffs into the lungs every 12 (twelve) hours. 30.6 g 3    calcium citrate-vitamin D3 315-200 mg (CITRACAL+D) 315 mg-5 mcg (200 unit) per tablet Take 1 tablet by mouth 2 (two) times daily.       cycloSPORINE (RESTASIS) 0.05 % ophthalmic emulsion PLACE 1 DROP INTO BOTH EYES TWICE A  each 3    diclofenac sodium (VOLTAREN) 1 % Gel APPLY 2 GRAMS  TOPICALLY 4 (FOUR) TIMES DAILY AS NEEDED. 300 g 2    fluconazole (DIFLUCAN) 150 MG Tab Take 150 mg by mouth as needed.      INV PROPULSID 10 MG Take 2 tablets (20 mg) by mouth 4 (four) times daily. FOR INVESTIGATIONAL USE ONLY. Protocol [id number] Subject ID: [id number]. 1440 each 0    leflunomide (ARAVA) 20 MG Tab Take 1 tablet (20 mg total) by mouth once daily. 90 tablet 0    lifitegrast (XIIDRA) 5 % Dpet INSTILL ONE DROP INTO BOTH EYES TWICE A DAY 60 mL 10    methenamine (HIPREX) 1 gram Tab Take 1 tablet (1 g total) by mouth 2 (two) times daily. 180 tablet 3    mirabegron (MYRBETRIQ) 25 mg Tb24 ER tablet Take 1 tablet (25 mg total) by mouth once daily. 90 tablet 3    montelukast (SINGULAIR) 10 mg tablet Take 1 tablet (10 mg total) by mouth every evening. 90 tablet 3    naproxen (NAPROSYN) 500 MG tablet TAKE 1 TABLET (500 MG TOTAL) BY MOUTH 2 (TWO) TIMES DAILY AS NEEDED 180 tablet 1    omeprazole (PRILOSEC) 40 MG capsule Take 1 capsule (40 mg total) by mouth every morning. 30 capsule 6    omeprazole-sodium bicarbonate (ZEGERID) 40-1.1 mg-gram per capsule TAKE 1 CAPSULE BY MOUTH EVERY DAY IN THE MORNING 90 capsule 3    POTASSIUM ORAL Take by mouth once daily.      predniSONE (DELTASONE) 5 MG tablet TAKE 1 TABLET BY MOUTH EVERY DAY 90 tablet 1    pregabalin (LYRICA) 150 MG capsule Take 1 capsule (150 mg total) by mouth 3 (three) times daily. 90 capsule 2    promethazine (PHENERGAN) 25 MG tablet Take 1 tablet (25 mg total) by mouth every 6 (six) hours as needed for Nausea. 15 tablet 0    rosuvastatin (CRESTOR) 20 MG tablet Take 1 tablet by mouth every day in the evening 90 tablet 3    sucralfate (CARAFATE) 1 gram tablet TAKE 1 TABLET (1 G TOTAL) BY MOUTH 4 (FOUR) TIMES DAILY. 360 tablet 2    albuterol-ipratropium (DUO-NEB) 2.5 mg-0.5 mg/3 mL nebulizer solution Take 3 mLs by nebulization every 6 (six) hours as needed for Wheezing. Rescue 90 mL 3    azelastine (ASTELIN) 137 mcg (0.1 %) nasal spray Use 1 spray (137 mcg  "total) in each nostril 2 (two) times daily. 30 mL 1    fluticasone propionate (FLONASE) 50 mcg/actuation nasal spray Use 1 spray (50 mcg total) in each nostril once daily. 16 g 1    methocarbamoL (ROBAXIN) 750 MG Tab Take 1 tablet (750 mg total) by mouth 3 (three) times daily as needed. 90 tablet 1    oxyCODONE-acetaminophen (PERCOCET) 5-325 mg per tablet Take 1 tablet by mouth every 6 (six) hours as needed for Pain. 28 tablet 0    sarilumab (KEVZARA) 200 mg/1.14 mL Syrg Inject 1.14 mL (200 mg total) into the skin every 14 (fourteen) days. 2.28 mL 1     No current facility-administered medications for this visit.     Review of patient's allergies indicates:   Allergen Reactions    Actemra [tocilizumab] Other (See Comments)     Severe dizziness    Codeine Nausea And Vomiting and Hives    Gold au 198 Hives and Rash    Hydroxychloroquine Other (See Comments)     Can't remember the reaction      Iodinated contrast media Other (See Comments)     Other reaction(s): BURNING ALL OVER    Iodine Other (See Comments) and Hives     Other reaction(s): BURNING ALL OVER - Iodine dye - Not topical    Ondansetron Nausea And Vomiting    Sulfa (sulfonamide antibiotics) Other (See Comments)     Can't remember the reaction    Zofran [ondansetron hcl (pf)] Nausea And Vomiting     Pt reports that last time she received zofran she started vomiting again    Methotrexate analogues Nausea Only    Pneumococcal vaccine Other (See Comments)    Pneumovax 23 [pneumococcal 23-argenis ps vaccine] Other (See Comments)     "sick"    Methotrexate Nausea Only     Social History     Socioeconomic History    Marital status:    Tobacco Use    Smoking status: Never    Smokeless tobacco: Never   Substance and Sexual Activity    Alcohol use: Yes     Comment: 2-3 times per year.    Drug use: No    Sexual activity: Yes     Partners: Male     Birth control/protection: Surgical     Social Determinants of Health     Financial Resource Strain: Low Risk  " (9/6/2023)    Overall Financial Resource Strain (CARDIA)     Difficulty of Paying Living Expenses: Not hard at all   Food Insecurity: No Food Insecurity (9/6/2023)    Hunger Vital Sign     Worried About Running Out of Food in the Last Year: Never true     Ran Out of Food in the Last Year: Never true   Transportation Needs: No Transportation Needs (9/6/2023)    PRAPARE - Transportation     Lack of Transportation (Medical): No     Lack of Transportation (Non-Medical): No   Physical Activity: Inactive (9/6/2023)    Exercise Vital Sign     Days of Exercise per Week: 0 days     Minutes of Exercise per Session: 0 min   Stress: No Stress Concern Present (9/6/2023)    [employer] - Occupational Stress Questionnaire     Feeling of Stress : Not at all   Social Connections: Socially Integrated (9/6/2023)    Social Connection and Isolation Panel [NHANES]     Frequency of Communication with Friends and Family: Twice a week     Frequency of Social Gatherings with Friends and Family: More than three times a week     Attends Druze Services: More than 4 times per year     Active Member of Clubs or Organizations: Yes     Attends Club or Organization Meetings: More than 4 times per year     Marital Status:    Housing Stability: Low Risk  (9/6/2023)    Housing Stability Vital Sign     Unable to Pay for Housing in the Last Year: No     Number of Places Lived in the Last Year: 1     Unstable Housing in the Last Year: No       ROS    REVIEW OF SYSTEMS: Negative as documented below as well as positive findings in bold.       Constitutional  Respiratory  Gastrointestinal  Skin   - Fever - Cough - Heartburn - Rash   - Chills - Spit blood - Nausea - Itching   - Weight Loss - Shortness of breath - Vomiting - Nail pain   - Malaise/Fatigue - Wheezing - Abdominal Pain  Wound/Ulcer   - Weight Gain   - Blood in Stool  Poor wound healing       - Diarrhea          Cardiovascular  Genitourinary  Neurological   "HEENT   - Chest Pain - Dysuria - Burning Sensation of feet - Headache   - Palpitations - Hematuria - Tingling / Paresthesia - Congestion   - Pain at night in legs - Flank Pain - Dizziness - Sore Throat   - Cramping   - Tremor - Blurred Vision   - Leg Swelling   - Sensory Change - Double Vision   - Dizzy when standing   - Speech Change - Eye Redness       - Focal Weakness - Dry Eyes       - Loss of Consciousness          Endocrine  Musculoskeletal  Psychiatric   - Cold intolerance - Muscle Pain - Depression   - Heat intolerance - Neck Pain - Insomnia   - Anemia - Joint Pain - Memory Loss   -  Easy bruising, bleeding - Heel pain - Anxiety      Toe Pain        Leg/Ankle/Foot Pain         Objective:     Ht 5' 1" (1.549 m)   Wt 59 kg (130 lb 1.1 oz)   LMP  (LMP Unknown)   BMI 24.58 kg/m²   Vitals:    10/19/23 1142   Weight: 59 kg (130 lb 1.1 oz)   Height: 5' 1" (1.549 m)   PainSc:   3   PainLoc: Back         Physical Exam    General Appearance:   Patient appears well developed, well nourished  Patient appears stated age    Psychiatric:   Patient is oriented to time, place, and person.  Patient has appropriate mood and affect    Neck:  Trachea Midline  No visible masses    Respiratory/Ears:  No distress or labored breathing.  Able to differentiate between normal talking voice and whisper.  Able to follow commands    Eyes:  Visual Acuity intact  Lids and conjunctivae normal. No discoloration noted.    Physical Exam  Ortho Exam  Ortho/SPM Exam  Foot Exam  Physical Exam  Neurologic Exam    R LE exam con't:  V:  DP 1/4, PT 2/4   CRT< 3s to all digits tested   Tibial and popliteal lymph nodes are w/o abnormality    No edema, + VV    N:  Patient displays normal ankle reflexes   SILT in SP/DP/T/Opal/Saph distributions    Ortho: +Motor EHL/FHL/TA/GA   +TTP R 3rd toe   Compartments soft/compressible. No pain on passive stretch of big toe. No calf  Pain.   hammertoe deformity present 3rd toe    Derm:  skin not intact, ulceration " present,  ulcer probes to subQ      Ulcer Location: 3rd toe medial  Measurements (post-debridement): 0.1x0.1x0.5cm  Periwound: hyperkeratotic  Drainage: none  Malodor: none  Base:  fibrotic  Signs of infection: none    Imaging / Labs:    Hemoglobin A1C   Date Value Ref Range Status   03/15/2022 5.2 4.0 - 5.6 % Final     Comment:     ADA Screening Guidelines:  5.7-6.4%  Consistent with prediabetes  >or=6.5%  Consistent with diabetes    High levels of fetal hemoglobin interfere with the HbA1C  assay. Heterozygous hemoglobin variants (HbS, HgC, etc)do  not significantly interfere with this assay.   However, presence of multiple variants may affect accuracy.     02/06/2017 6.0 4.5 - 6.2 % Final     Comment:     According to ADA guidelines, hemoglobin A1C <7.0% represents  optimal control in non-pregnant diabetic patients.  Different  metrics may apply to specific populations.   Standards of Medical Care in Diabetes - 2016.  For the purpose of screening for the presence of diabetes:  <5.7%     Consistent with the absence of diabetes  5.7-6.4%  Consistent with increasing risk for diabetes   (prediabetes)  >or=6.5%  Consistent with diabetes  Currently no consensus exists for use of hemoglobin A1C  for diagnosis of diabetes for children.     09/28/2015 5.6 4.5 - 6.2 % Final           Note: This was dictated using a computer transcription program. Although proofread, it may contain computer transcription errors and phonetic errors. Other human proofreading errors may also exist. Corrections may be performed at a later time. Please contact us for any clarification if needed.    Isiah Mark DPM  [facility]

## 2023-10-20 ENCOUNTER — OFFICE VISIT (OUTPATIENT)
Dept: FAMILY MEDICINE | Facility: CLINIC | Age: 63
End: 2023-10-20
Payer: MEDICARE

## 2023-10-20 ENCOUNTER — PATIENT MESSAGE (OUTPATIENT)
Dept: FAMILY MEDICINE | Facility: CLINIC | Age: 63
End: 2023-10-20
Payer: MEDICARE

## 2023-10-20 VITALS
DIASTOLIC BLOOD PRESSURE: 62 MMHG | OXYGEN SATURATION: 95 % | HEART RATE: 107 BPM | TEMPERATURE: 98 F | BODY MASS INDEX: 25.86 KG/M2 | WEIGHT: 137 LBS | SYSTOLIC BLOOD PRESSURE: 126 MMHG | HEIGHT: 61 IN

## 2023-10-20 DIAGNOSIS — B96.89 ACUTE BACTERIAL SINUSITIS: Primary | ICD-10-CM

## 2023-10-20 DIAGNOSIS — J01.90 ACUTE BACTERIAL SINUSITIS: Primary | ICD-10-CM

## 2023-10-20 PROCEDURE — 1159F PR MEDICATION LIST DOCUMENTED IN MEDICAL RECORD: ICD-10-PCS | Mod: CPTII,S$GLB,, | Performed by: STUDENT IN AN ORGANIZED HEALTH CARE EDUCATION/TRAINING PROGRAM

## 2023-10-20 PROCEDURE — 3008F BODY MASS INDEX DOCD: CPT | Mod: CPTII,S$GLB,, | Performed by: STUDENT IN AN ORGANIZED HEALTH CARE EDUCATION/TRAINING PROGRAM

## 2023-10-20 PROCEDURE — 3078F PR MOST RECENT DIASTOLIC BLOOD PRESSURE < 80 MM HG: ICD-10-PCS | Mod: CPTII,S$GLB,, | Performed by: STUDENT IN AN ORGANIZED HEALTH CARE EDUCATION/TRAINING PROGRAM

## 2023-10-20 PROCEDURE — 99999 PR PBB SHADOW E&M-EST. PATIENT-LVL V: CPT | Mod: PBBFAC,,, | Performed by: STUDENT IN AN ORGANIZED HEALTH CARE EDUCATION/TRAINING PROGRAM

## 2023-10-20 PROCEDURE — 3074F PR MOST RECENT SYSTOLIC BLOOD PRESSURE < 130 MM HG: ICD-10-PCS | Mod: CPTII,S$GLB,, | Performed by: STUDENT IN AN ORGANIZED HEALTH CARE EDUCATION/TRAINING PROGRAM

## 2023-10-20 PROCEDURE — 3078F DIAST BP <80 MM HG: CPT | Mod: CPTII,S$GLB,, | Performed by: STUDENT IN AN ORGANIZED HEALTH CARE EDUCATION/TRAINING PROGRAM

## 2023-10-20 PROCEDURE — 99215 OFFICE O/P EST HI 40 MIN: CPT | Mod: S$GLB,,, | Performed by: STUDENT IN AN ORGANIZED HEALTH CARE EDUCATION/TRAINING PROGRAM

## 2023-10-20 PROCEDURE — 3074F SYST BP LT 130 MM HG: CPT | Mod: CPTII,S$GLB,, | Performed by: STUDENT IN AN ORGANIZED HEALTH CARE EDUCATION/TRAINING PROGRAM

## 2023-10-20 PROCEDURE — 3008F PR BODY MASS INDEX (BMI) DOCUMENTED: ICD-10-PCS | Mod: CPTII,S$GLB,, | Performed by: STUDENT IN AN ORGANIZED HEALTH CARE EDUCATION/TRAINING PROGRAM

## 2023-10-20 PROCEDURE — 1159F MED LIST DOCD IN RCRD: CPT | Mod: CPTII,S$GLB,, | Performed by: STUDENT IN AN ORGANIZED HEALTH CARE EDUCATION/TRAINING PROGRAM

## 2023-10-20 PROCEDURE — 1160F PR REVIEW ALL MEDS BY PRESCRIBER/CLIN PHARMACIST DOCUMENTED: ICD-10-PCS | Mod: CPTII,S$GLB,, | Performed by: STUDENT IN AN ORGANIZED HEALTH CARE EDUCATION/TRAINING PROGRAM

## 2023-10-20 PROCEDURE — 1160F RVW MEDS BY RX/DR IN RCRD: CPT | Mod: CPTII,S$GLB,, | Performed by: STUDENT IN AN ORGANIZED HEALTH CARE EDUCATION/TRAINING PROGRAM

## 2023-10-20 PROCEDURE — 99999 PR PBB SHADOW E&M-EST. PATIENT-LVL V: ICD-10-PCS | Mod: PBBFAC,,, | Performed by: STUDENT IN AN ORGANIZED HEALTH CARE EDUCATION/TRAINING PROGRAM

## 2023-10-20 PROCEDURE — 99215 PR OFFICE/OUTPT VISIT, EST, LEVL V, 40-54 MIN: ICD-10-PCS | Mod: S$GLB,,, | Performed by: STUDENT IN AN ORGANIZED HEALTH CARE EDUCATION/TRAINING PROGRAM

## 2023-10-20 RX ORDER — CEFDINIR 300 MG/1
300 CAPSULE ORAL 2 TIMES DAILY
Qty: 14 CAPSULE | Refills: 0 | Status: SHIPPED | OUTPATIENT
Start: 2023-10-20 | End: 2023-10-27

## 2023-10-20 RX ORDER — AZELASTINE 1 MG/ML
1 SPRAY, METERED NASAL 2 TIMES DAILY
Qty: 30 ML | Refills: 1 | Status: SHIPPED | OUTPATIENT
Start: 2023-10-20 | End: 2024-10-19

## 2023-10-20 RX ORDER — FLUTICASONE PROPIONATE 50 MCG
1 SPRAY, SUSPENSION (ML) NASAL DAILY
Qty: 16 G | Refills: 1 | Status: SHIPPED | OUTPATIENT
Start: 2023-10-20

## 2023-10-20 RX ORDER — METHOCARBAMOL 750 MG/1
750 TABLET, FILM COATED ORAL 3 TIMES DAILY PRN
Qty: 90 TABLET | Refills: 1 | Status: SHIPPED | OUTPATIENT
Start: 2023-10-20 | End: 2023-12-26 | Stop reason: SDUPTHER

## 2023-10-20 NOTE — PROGRESS NOTES
Subjective:      Patient ID: Joyce Peterson is a 63 y.o. female.    Chief Complaint: Sinus Problem (Started a month ago with runny nose sinus drip that has progressed to being horse, coughing. )    - started 2 weeks ago with allergies   - around children then got worse  - immunocompromised  - feels at this point with ongoing needs abx       Sinus Problem  This is a new problem. The current episode started 1 to 4 weeks ago (2 weeks). There has been no fever. The pain is mild. Associated symptoms include congestion, coughing, ear pain, headaches, a hoarse voice, sinus pressure, sneezing and a sore throat. Pertinent negatives include no shortness of breath. Past treatments include nasal decongestants, saline nose sprays, oral decongestants and acetaminophen. The treatment provided mild relief.     Review of Systems   Constitutional:  Negative for fatigue and fever.   HENT:  Positive for congestion, ear pain, hoarse voice, postnasal drip, rhinorrhea, sinus pressure, sinus pain, sneezing and sore throat.    Respiratory:  Positive for cough. Negative for shortness of breath.    Neurological:  Positive for headaches.        Objective:     Vitals:    10/20/23 1346   BP: 126/62   Pulse: 107   Temp: 98.1 °F (36.7 °C)      Physical Exam  Constitutional:       Appearance: Normal appearance. She is normal weight.   HENT:      Head: Normocephalic and atraumatic.      Right Ear: A middle ear effusion is present.      Left Ear: A middle ear effusion is present.      Nose: Congestion and rhinorrhea present.      Mouth/Throat:      Pharynx: Pharyngeal swelling and posterior oropharyngeal erythema present.   Eyes:      Conjunctiva/sclera: Conjunctivae normal.   Cardiovascular:      Rate and Rhythm: Normal rate and regular rhythm.      Heart sounds: Normal heart sounds.   Pulmonary:      Effort: Pulmonary effort is normal. No respiratory distress.      Breath sounds: Normal breath sounds. No wheezing, rhonchi or rales.   Abdominal:       Palpations: Abdomen is soft.   Musculoskeletal:      Right lower leg: No edema.      Left lower leg: No edema.   Neurological:      General: No focal deficit present.      Mental Status: She is alert.   Psychiatric:         Mood and Affect: Mood normal.         Behavior: Behavior normal.        Assessment:         1. Acute bacterial sinusitis          Plan:   1. Acute bacterial sinusitis  - cefdinir (OMNICEF) 300 MG capsule; Take 1 capsule (300 mg total) by mouth 2 (two) times daily for 7 days  Dispense: 14 capsule; Refill: 0     Start cefdinir BID  Increase chronic steroids to 10mg for 10 days  Continue OTC cold and flu meds and nasal sprays   RTC if symptoms worsen or fail to improve          Krystal Singleton   [facility]   10/20/23     I spent a total of >40 minutes on the day of the visit.This includes face to face time and non-face to face time preparing to see the patient (eg, review of tests), obtaining and/or reviewing separately obtained history, documenting clinical information in the electronic or other health record, independently interpreting results and communicating results to the patient/family/caregiver, or care coordinator.

## 2023-10-25 ENCOUNTER — PATIENT MESSAGE (OUTPATIENT)
Dept: ORTHOPEDICS | Facility: CLINIC | Age: 63
End: 2023-10-25
Payer: MEDICARE

## 2023-11-06 ENCOUNTER — PATIENT MESSAGE (OUTPATIENT)
Dept: HEPATOLOGY | Facility: CLINIC | Age: 63
End: 2023-11-06
Payer: MEDICARE

## 2023-11-06 DIAGNOSIS — M06.9 RHEUMATOID ARTHRITIS, INVOLVING UNSPECIFIED SITE, UNSPECIFIED WHETHER RHEUMATOID FACTOR PRESENT: ICD-10-CM

## 2023-11-06 RX ORDER — SARILUMAB 200 MG/1.14ML
200 INJECTION, SOLUTION SUBCUTANEOUS
Qty: 2.28 ML | Refills: 1 | Status: ACTIVE | OUTPATIENT
Start: 2023-11-06 | End: 2023-12-07 | Stop reason: SDUPTHER

## 2023-11-07 ENCOUNTER — PROCEDURE VISIT (OUTPATIENT)
Dept: HEPATOLOGY | Facility: CLINIC | Age: 63
End: 2023-11-07
Payer: MEDICARE

## 2023-11-07 ENCOUNTER — TELEPHONE (OUTPATIENT)
Dept: HEPATOLOGY | Facility: CLINIC | Age: 63
End: 2023-11-07

## 2023-11-07 ENCOUNTER — OFFICE VISIT (OUTPATIENT)
Dept: HEPATOLOGY | Facility: CLINIC | Age: 63
End: 2023-11-07
Payer: MEDICARE

## 2023-11-07 VITALS — BODY MASS INDEX: 25.84 KG/M2 | WEIGHT: 136.88 LBS | HEIGHT: 61 IN

## 2023-11-07 DIAGNOSIS — R74.8 ELEVATED SERUM GGT LEVEL: Primary | ICD-10-CM

## 2023-11-07 DIAGNOSIS — Z92.21 HISTORY OF METHOTREXATE THERAPY: ICD-10-CM

## 2023-11-07 DIAGNOSIS — E78.00 PURE HYPERCHOLESTEROLEMIA: ICD-10-CM

## 2023-11-07 DIAGNOSIS — Z79.899 LONG-TERM USE OF HIGH-RISK MEDICATION: ICD-10-CM

## 2023-11-07 DIAGNOSIS — M06.9 RHEUMATOID ARTHRITIS INVOLVING MULTIPLE SITES, UNSPECIFIED WHETHER RHEUMATOID FACTOR PRESENT: Chronic | ICD-10-CM

## 2023-11-07 DIAGNOSIS — K76.0 FATTY LIVER: ICD-10-CM

## 2023-11-07 PROBLEM — Z47.89 ENCOUNTER FOR OTHER ORTHOPEDIC AFTERCARE: Status: ACTIVE | Noted: 2023-06-29

## 2023-11-07 PROBLEM — Z91.81 HISTORY OF FALLING: Status: ACTIVE | Noted: 2023-06-29

## 2023-11-07 PROBLEM — Z98.1 ARTHRODESIS STATUS: Status: ACTIVE | Noted: 2023-06-29

## 2023-11-07 PROBLEM — Z79.51 LONG TERM (CURRENT) USE OF INHALED STEROIDS: Status: ACTIVE | Noted: 2023-06-29

## 2023-11-07 PROBLEM — M48.061 SPINAL STENOSIS, LUMBAR REGION WITHOUT NEUROGENIC CLAUDICATION: Status: ACTIVE | Noted: 2021-07-23

## 2023-11-07 PROBLEM — M19.072 PRIMARY OSTEOARTHRITIS, LEFT ANKLE AND FOOT: Status: ACTIVE | Noted: 2022-06-28

## 2023-11-07 PROBLEM — D64.9 ANEMIA, UNSPECIFIED: Status: ACTIVE | Noted: 2023-06-29

## 2023-11-07 PROBLEM — M81.0 AGE-RELATED OSTEOPOROSIS WITHOUT CURRENT PATHOLOGICAL FRACTURE: Status: ACTIVE | Noted: 2023-06-29

## 2023-11-07 PROCEDURE — 99213 OFFICE O/P EST LOW 20 MIN: CPT | Mod: S$GLB,,, | Performed by: NURSE PRACTITIONER

## 2023-11-07 PROCEDURE — 1160F RVW MEDS BY RX/DR IN RCRD: CPT | Mod: CPTII,S$GLB,, | Performed by: NURSE PRACTITIONER

## 2023-11-07 PROCEDURE — 76981 USE PARENCHYMA: CPT | Mod: S$GLB,,, | Performed by: NURSE PRACTITIONER

## 2023-11-07 PROCEDURE — 99213 PR OFFICE/OUTPT VISIT, EST, LEVL III, 20-29 MIN: ICD-10-PCS | Mod: S$GLB,,, | Performed by: NURSE PRACTITIONER

## 2023-11-07 PROCEDURE — 3008F PR BODY MASS INDEX (BMI) DOCUMENTED: ICD-10-PCS | Mod: CPTII,S$GLB,, | Performed by: NURSE PRACTITIONER

## 2023-11-07 PROCEDURE — 1159F MED LIST DOCD IN RCRD: CPT | Mod: CPTII,S$GLB,, | Performed by: NURSE PRACTITIONER

## 2023-11-07 PROCEDURE — 1160F PR REVIEW ALL MEDS BY PRESCRIBER/CLIN PHARMACIST DOCUMENTED: ICD-10-PCS | Mod: CPTII,S$GLB,, | Performed by: NURSE PRACTITIONER

## 2023-11-07 PROCEDURE — 3008F BODY MASS INDEX DOCD: CPT | Mod: CPTII,S$GLB,, | Performed by: NURSE PRACTITIONER

## 2023-11-07 PROCEDURE — 99999 PR PBB SHADOW E&M-EST. PATIENT-LVL IV: ICD-10-PCS | Mod: PBBFAC,,, | Performed by: NURSE PRACTITIONER

## 2023-11-07 PROCEDURE — 76981 FIBROSCAN NEW ORLEANS (VIBRATION CONTROLLED TRANSIENT ELASTOGRAPHY): ICD-10-PCS | Mod: S$GLB,,, | Performed by: NURSE PRACTITIONER

## 2023-11-07 PROCEDURE — 1159F PR MEDICATION LIST DOCUMENTED IN MEDICAL RECORD: ICD-10-PCS | Mod: CPTII,S$GLB,, | Performed by: NURSE PRACTITIONER

## 2023-11-07 PROCEDURE — 99999 PR PBB SHADOW E&M-EST. PATIENT-LVL IV: CPT | Mod: PBBFAC,,, | Performed by: NURSE PRACTITIONER

## 2023-11-07 NOTE — PROCEDURES
FibroScan [place] (Vibration Controlled Transient Elastography)    Date/Time: 11/7/2023 1:30 PM    Performed by: Candida Walls NP  Authorized by: Candida Walls NP    Diagnosis:  NAFLD    Probe:  M    Universal Protocol: Patient's identity, procedure and site were verified, confirmatory pause was performed.  Discussed procedure including risks and potential complications.  Questions answered.  Patient verbalizes understanding and wishes to proceed with VCTE.     Procedure: After providing explanations of the procedure, patient was placed in the supine position with right arm in maximum abduction to allow optimal exposure of right lateral abdomen.  Patient was briefly assessed, Testing was performed in the mid-axillary location, 50Hz Shear Wave pulses were applied and the resulting Shear Wave and Propagation Speed detected with a 3.5 MHz ultrasonic signal, using the FibroScan probe, Skin to liver capsule distance and liver parenchyma were accessed during the entire examination with the FibroScan probe, Patient was instructed to breathe normally and to abstain from sudden movements during the procedure, allowing for random measurements of liver stiffness. At least 10 Shear Waves were produced, Individual measurements of each Shear Wave were calculated.  Patient tolerated the procedure well with no complications.  Meets discharge criteria as was dismissed.  Rates pain 0 out of 10.  Patient will follow up with ordering provider to review results.    Findings  Median liver stiffness score:  5.5  CAP Reading: dB/m:  184    IQR/med %:  13  Interpretation  Fibrosis interpretation is based on medial liver stiffness - Kilopascal (kPa).    Fibrosis Stage:  F 0-1  Steatosis interpretation is based on controlled attenuation parameter - (dB/m).    Steatosis Grade:  <S1

## 2023-11-07 NOTE — PROGRESS NOTES
HEPATOLOGY CLINIC VISIT NOTE     REASON FOR VISIT: Elevated GGT    Mrs. Peterson is a very pleasant 57-year-old lady with history of long-standing RA and Sjogren's syndrome, previously treated with low-dose methotrexate for 10 years. She was last seen in clinic by myself in 1/2022. She was initially referred to Hepatology in 2018 for isolated elevation of GGT. Abdominal ultrasound at that time showed no significant liver or biliary abnormalities. She underwent Fibroscan due to elevated GGT and long term use of MTX. The Fibroscan was suggestive of no hepatic steatosis, with F0-F1 fibrosis, and a low likelihood of cirrhosis.     AMA to screen for PBC was also negative. The GGT eventually trended down, and the remainder of her LFT's remained normal/stable over the last 2-3 years, with an occasional mild elevation in ALT and AST noted. She has a strong family history of Alcohol abuse, but denies any personal history of heavy alcohol intake. She consumes limited quantities of alcohol 2-3 times per year. She discontinued MTX in 2018/2019, and is now on Kevzara, Leflunomide and low dose Prednisone for RA. She also takes Naproxen and Tramadol PRN.    Repeat Fibroscan in 3/2021 and again in 3/2022 was suggestive of no progression in fibrosis. Most recent GGT in 9/2023 was again elevated at 287, likely secondary to use of high risk medications; the rest of her LFT's are normal/stable. Abdominal ultrasound on 1/11/2022 showed no acute findings.     FIB-4 Calculation: 1.6 at 11/7/2023  2:15 PM   Calculated from:  Last SGOT/AST : 42 at 11/7/2023  2:15 PM  Last SGPT/ALT: 37 at 11/7/2023  2:15 PM   Platelets: 272 at 11/7/2023  2:15 PM   Age: 63 y.o.     FIB-4 below 1.30 is considered as low-risk for advanced fibrosis  FIB-4 over 2.67 is considered as high-risk for advanced fibrosis  FIB-4 values between 1.30 and 2.67 are considered as intermediate-risk of advanced fibrosis for ages 36-64.     For ages > 64 the cut-off for  low-risk goes to < 2.  This is a screening tool and clinical judgement should be used in the interpretation of these results.    She has lost 18 lbs since last visit. She notes that she underwent 2 surgeries, and is eating smaller portions. Fibroscan today is suggestive of minimal fatty infiltration of the liver (<S1), with F0-F1 fibrosis, and a low likelihood of cirrhosis. She is well appearing, and denies any signs or symptoms of decompensated liver disease including jaundice, abdominal distention, hematochezia or melena, or periods of confusion suggestive of hepatic encephalopathy. No abnormal skin rashes or itching.    Past Medical History:   Diagnosis Date    Acid reflux     Allergy     Anemia     Asthma     Coronary artery disease     CS (cervical spondylosis) 03/08/2013    Degenerative disc disease     Degenerative joint disease of hindfoot, left 6/28/2022    Dry eyes     Dry mouth     Gastroparesis     History of methotrexate therapy 01/19/2022    Hyperlipidemia     Lateral meniscus derangement 04/06/2016    Lobular carcinoma in situ     Lumbar spondylosis 03/08/2013    Osteoarthritis     Osteoporosis     Pes planovalgus, acquired, left 6/28/2022    Rheumatoid arthritis(714.0)     Rupture of left triceps tendon 10/17/2018    Umbilical hernia 08/13/2015     Past Surgical History:   Procedure Laterality Date    BREAST BIOPSY Left 01/29/2002    core bx    CARPAL TUNNEL RELEASE Right 05/2017    CHOLECYSTECTOMY  2004    COLONOSCOPY      10/11    COLONOSCOPY N/A 6/29/2017    Procedure: COLONOSCOPY;  Surgeon: López Moore MD;  Location: [facility] ENDO [address]);  Service: Endoscopy;  Laterality: N/A;    EPIDURAL STEROID INJECTION N/A 11/18/2021    Procedure: INJECTION, STEROID, EPIDURAL, L5-S1 IL NEED CONSENT;  Surgeon: Tj Mayfield MD;  Location: [facility] PAIN MGT;  Service: Pain Management;  Laterality: N/A;    EPIDURAL STEROID INJECTION N/A 9/29/2022    Procedure: LUMBAR L3/L4 IL MADELINE CONTRAST;  Surgeon: Tj Mayfield  MD;  Location: [facility] PAIN MGT;  Service: Pain Management;  Laterality: N/A;    EPIDURAL STEROID INJECTION N/A 12/12/2022    Procedure: INJECTION, STEROID, EPIDURAL L5/S1 IL CONTRAST;  Surgeon: Tj Mayfield MD;  Location: [facility] PAIN MGT;  Service: Pain Management;  Laterality: N/A;    EPIDURAL STEROID INJECTION N/A 4/6/2023    Procedure: INJECTION, STEROID, EPIDURAL L5/S1;  Surgeon: Tj Mayfield MD;  Location: [facility] PAIN MGT;  Service: Pain Management;  Laterality: N/A;    FOOT ARTHRODESIS Left 1/11/2023    Procedure: FUSION, FOOT;  Surgeon: Ariella Finnegan MD;  Location: [facility] OR;  Service: Orthopedics;  Laterality: Left;  nimbus    FUSION, SPINE, LUMBAR, TLIF, POSTERIOR APPROACH, USING PEDICLE SCREW N/A 6/22/2023    Procedure: FUSION, SPINE, LUMBAR, TLIF, POSTERIOR APPROACH, USING PEDICLE SCREW L4/5 spinewave SNS:SSEP/EMG;  Surgeon: Jh Mosqueda MD;  Location: [address];  Service: Neurosurgery;  Laterality: N/A;    HARVESTING OF BONE GRAFT Left 1/11/2023    Procedure: SURGICAL PROCUREMENT, BONE GRAFT;  Surgeon: Ariella Finnegan MD;  Location: [facility] OR;  Service: Orthopedics;  Laterality: Left;    INJECTION OF ANESTHETIC AGENT AROUND NERVE Bilateral 8/23/2021    Procedure: BLOCK, NERVE, MEDIAL BRANCH L3,L4,L5;  Surgeon: Tj Mayfield MD;  Location: [facility] PAIN MGT;  Service: Pain Management;  Laterality: Bilateral;  1 of 2    INJECTION OF ANESTHETIC AGENT AROUND NERVE Bilateral 8/26/2021    Procedure: BLOCK, NERVE, MEDIAL BRANCH L3,L4,L5;  Surgeon: Tj Mayfield MD;  Location: [facility] PAIN MGT;  Service: Pain Management;  Laterality: Bilateral;  2 of 2    INJECTION OF ANESTHETIC AGENT AROUND NERVE Left 7/7/2022    Procedure: BLOCK, NERVE, LEFT OBTURATOR AND FEMORAL;  Surgeon: Tj Mayfield MD;  Location: [facility] PAIN MGT;  Service: Pain Management;  Laterality: Left;    INJECTION OF FACET JOINT Bilateral 3/12/2020    Procedure: FACET JOINT INJECTION (LUMBAR BLOCK) BILATERAL L4-5 AND L5-S1 DIRECT REFERRAL;  Surgeon: Tj  MD Tran;  Location: [facility] PAIN MGT;  Service: Pain Management;  Laterality: Bilateral;  NEEDS CONSENT    INJECTION OF FACET JOINT Bilateral 3/29/2021    Procedure: INJECTION, FACET JOINT L3/4, L4/5, L5/S1;  Surgeon: Tj Mayfield MD;  Location: [facility] PAIN MGT;  Service: Pain Management;  Laterality: Bilateral;    INJECTION OF JOINT Right 7/30/2020    Procedure: INJECTION, JOINT, RIGHT HIP Interarticular under flouro;  Surgeon: Tj Mayfield MD;  Location: [facility] PAIN MGT;  Service: Pain Management;  Laterality: Right;  INJECTION, JOINT, RIGHT HIP Interarticular under flouro    INJECTION OF JOINT Right 2/21/2022    Procedure: Injection, Joint RIGHT ISCHIAL BURSA;  Surgeon: Tj Mayfield MD;  Location: [facility] PAIN MGT;  Service: Pain Management;  Laterality: Right;    INTRAMEDULLARY RODDING OF FEMUR Left 5/21/2019    Procedure: INSERTION, INTRAMEDULLARY ARIEL, FEMUR;  Surgeon: López Duff MD;  Location: [address];  Service: Orthopedics;  Laterality: Left;    LENGTHENING OF TENDON OF LOWER EXTREMITY Left 1/11/2023    Procedure: LENGTHENING, TENDON, LOWER EXTREMITY;  Surgeon: Ariella Finnegan MD;  Location: [facility];  Service: Orthopedics;  Laterality: Left;    MAGNETIC RESONANCE IMAGING N/A 5/29/2023    Procedure: MRI (Magnetic Resonance Imagine);  Surgeon: Sujey Robin;  Location: [facility];  Service: Anesthesiology;  Laterality: N/A;    RADIOFREQUENCY ABLATION Left 9/20/2021    Procedure: RADIOFREQUENCY ABLATION left L3,4,5 RFA  1 of 2  CONSENT NEEDED;  Surgeon: Tj Mayfield MD;  Location: [facility] PAIN MGT;  Service: Pain Management;  Laterality: Left;    RADIOFREQUENCY ABLATION Right 10/4/2021    Procedure: RADIOFREQUENCY ABLATION  right L3,4,5 RFA   2 of 2  CONSENT NEEDED;  Surgeon: Tj Mayfield MD;  Location: [facility] PAIN MGT;  Service: Pain Management;  Laterality: Right;    RADIOFREQUENCY ABLATION Left 4/21/2022    Procedure: Radiofrequency Ablation LEFT L3,L4,L5;  Surgeon: Tj Mayfield MD;  Location: [facility] PAIN MGT;   Service: Pain Management;  Laterality: Left;    RADIOFREQUENCY ABLATION Right 5/12/2022    Procedure: Radiofrequency Ablation RIGHT L3,L4,L5;  Surgeon: Tj Mayfield MD;  Location: [facility] PAIN MGT;  Service: Pain Management;  Laterality: Right;    RADIOFREQUENCY ABLATION Left 7/25/2022    Procedure: Radiofrequency Ablation Left Femoral & Obturator;  Surgeon: Tj Mayfield MD;  Location: [facility] PAIN MGT;  Service: Pain Management;  Laterality: Left;    REPAIR OF TRICEPS TENDON Left 10/17/2018    Procedure: REPAIR, TENDON, TRICEPS left elbow;  Surgeon: Staci Yarbrough MD;  Location: [facility] OR 1ST FLR;  Service: Orthopedics;  Laterality: Left;  Anesthesia: General and regional. PRONE, k-wire , hand pan 1 and pan 2, CALL ARTHREX/Tamera notified 10-12 LO    TONSILLECTOMY      TRANSFORAMINAL EPIDURAL INJECTION OF STEROID Right 8/31/2020    Procedure: INJECTION, STEROID, EPIDURAL, TRANSFORAMINAL,  APPROACH, L3-L4 and L4-L5 need consent;  Surgeon: Tj Mayfield MD;  Location: [facility] PAIN MGT;  Service: Pain Management;  Laterality: Right;    TRANSFORAMINAL EPIDURAL INJECTION OF STEROID Bilateral 7/23/2021    Procedure: INJECTION, STEROID, EPIDURAL, TRANSFORAMINAL APPROACH L4/5;  Surgeon: Tj Mayfield MD;  Location: [facility] PAIN MGT;  Service: Pain Management;  Laterality: Bilateral;    TRANSFORAMINAL EPIDURAL INJECTION OF STEROID Bilateral 9/25/2023    Procedure: LUMBAR TRANSFORAMINAL BILATERAL  L2/3 DIRECT REFERRAL;  Surgeon: Tj Mayfield MD;  Location: [facility] PAIN MGT;  Service: Pain Management;  Laterality: Bilateral;    TRANSFORAMINAL EPIDURAL INJECTION OF STEROID Left 10/18/2023    Procedure: LUMBAR TRANSFORAMINAL LEFT L3/4 AND L4/5 * CLEARANCE IN CHART*;  Surgeon: Tj Mayfield MD;  Location: [facility] PAIN MGT;  Service: Pain Management;  Laterality: Left;    TRIGGER POINT INJECTION N/A 7/23/2021    Procedure: INJECTION, TRIGGER POINT SCAPULAR;  Surgeon: Tj Mayfield MD;  Location: [facility] PAIN MGT;  Service: Pain  "Management;  Laterality: N/A;    TUBAL LIGATION  2003    UPPER GASTROINTESTINAL ENDOSCOPY      10/11    uterine ablation  2003     FAMILY HISTORY: Negative for liver disease  ETOH abuse in family   SOCIAL HISTORY:   Social History     Tobacco Use   Smoking Status Never   Smokeless Tobacco Never     Social History     Substance and Sexual Activity   Alcohol Use Yes    Comment: 2-3 times per year.       Social History     Substance and Sexual Activity   Drug Use No     ROS:   No fever, chills, weight loss  No chest pain, dyspnea, cough  No abdominal pain,  nausea, vomiting  No headaches, visual changes  No lower extremity edema  No depression or anxiety    PHYSICAL EXAM:  Friendly White female, in no acute distress; alert and oriented to person, place and time.  VITALS: Ht 5' 1" (1.549 m)   Wt 62.1 kg (136 lb 14.5 oz)   LMP  (LMP Unknown)   BMI 25.87 kg/m²   HEENT: Sclerae anicteric.   NECK: No obvious masses.  CVS: No peripheral edema.  LUNGS: Normal respiratory effort.  ABDOMEN: Flat, soft, nontender.   SKIN: Warm and dry. No jaundice, No obvious rashes.   EXTREMITIES: No lower extremity edema.  NEURO/PSYCH: Normal gate. Memory intact. Thought and speech pattern appropriate. Behavior normal. No depression or anxiety noted.    RECENT LABS:  Lab Results   Component Value Date    WBC 5.38 08/22/2023    HGB 11.0 (L) 08/22/2023     08/22/2023     Lab Results   Component Value Date    INR 1.0 06/15/2023     Lab Results   Component Value Date    AST 42 (H) 09/07/2023    ALT 37 09/07/2023    BILITOT 0.3 09/07/2023    ALBUMIN 4.0 09/07/2023    ALKPHOS 136 (H) 09/07/2023    CREATININE 0.8 08/22/2023    BUN 12 08/22/2023     08/22/2023    K 3.7 08/22/2023     DIAGNOSTIC IMAGING:    US ABDOMEN COMPLETE 7/16/2018:    FINDINGS:    The visualized pancreas is within normal limits.  The aorta is not aneurysmal.  The common duct measures 5 mm.  The gallbladder is absent.     Liver size is normal, 14.3 cm, and the " parenchyma demonstrates no abnormality.  H RI is 0.95.     Visualized inferior vena cava is unremarkable.     Kidney sizes are normal, 11.7 cm on the right and 11.6 cm on the left.  These were better evaluated on recent renal ultrasound.     The spleen is not enlarged, 7.5 cm.     IMPRESSION:      No finding to correlate with the history.     Cholecystectomy.     FIBROSCAN 2018:    Fibroscan reading: 3.4 KPa     Fibrosis:F 0-1      CAP readin dB/m     Steatosis: :<S1    US ABDOMEN COMPLETE 2021:    FINDINGS:  Pancreas: The visualized portions of pancreas appear normal     Aorta: No aneurysm visualized.     Inferior vena cava: Visualized portions are normal in appearance.     Liver: Normal in size, measuring 13.2 cm. Homogeneous echotexture with no focal hepatic lesions visualized.     Biliary system: Prior cholecystectomy.  The common duct is not dilated, measuring 2.4 mm.  No intrahepatic ductal dilatation.     Right kidney: Normal in size, measuring 11.3 cm with no hydronephrosis.     Left kidney: Normal in size, measuring 11.3 cm with no hydronephrosis.     Spleen: Normal in size, measuring 8.6 cm with a homogeneous echotexture.     Miscellaneous: No ascites.     Impression:     1. Prior cholecystectomy  2. No acute findings    FIBROSCAN 3/17/2021:    Findings  Median liver stiffness score:  5.8  CAP Reading: dB/m:  190     IQR/med %:  17  Interpretation  Fibrosis interpretation is based on medial liver stiffness - Kilopascal (kPa).     Fibrosis Stage:  F 0-1  Steatosis interpretation is based on controlled attenuation parameter - (dB/m).     Steatosis Grade:  <S1    FIBROSCAN 2023:    Findings  Median liver stiffness score:  5.5  CAP Reading: dB/m:  184     IQR/med %:  13  Interpretation  Fibrosis interpretation is based on medial liver stiffness - Kilopascal (kPa).     Fibrosis Stage:  F 0-1  Steatosis interpretation is based on controlled attenuation parameter - (dB/m).     Steatosis  Grade:  <S1     ASSESSMENT  63 y.o. White female with:  1. ELEVATED GGT & HISTORY OF LONG TERM USE OF MTX  -- Transaminases, ALP and synthetic function normal.  -- No biliary findings on recent imaging.  -- No history of heavy alcohol use.  -- No evidence of cholestasis.  -- AMA negative, when last checked in 2018.  -- Serial Fibroscans have been suggestive of no progression in fibrosis or steatosis.    PLAN:    -- Fibroscan today to restage liver disease.  -- Repeat LFT's every 3-6 months (can be done through PCP or Rheumatology)  -- Continue to limit alcohol use.  -- Avoid herbal supplements/alternative remedies.   -- Maintain a healthy weight, through diet and exercise.  -- Return to clinic in 2 years.       Hepatology Nurse Practitioner  [facility]

## 2023-11-07 NOTE — TELEPHONE ENCOUNTER
----- Message from Candida Walls NP sent at 11/7/2023  2:42 PM CST -----  Please set recall for RTC in 2 years with Fibroscan same day as visit. Thanks!

## 2023-11-07 NOTE — PATIENT INSTRUCTIONS
-- Fibroscan today to restage liver disease.  -- Repeat LFT's every 3-6 months (can be done through PCP or Rheumatology)  -- Continue to limit alcohol use.  -- Avoid herbal supplements/alternative remedies.   -- Maintain a healthy weight, through diet and exercise.  -- Return to clinic in 2 years.

## 2023-11-08 ENCOUNTER — PATIENT MESSAGE (OUTPATIENT)
Dept: PAIN MEDICINE | Facility: CLINIC | Age: 63
End: 2023-11-08
Payer: MEDICARE

## 2023-11-08 DIAGNOSIS — Z98.890 S/P SPINAL SURGERY: Primary | ICD-10-CM

## 2023-11-08 RX ORDER — OXYCODONE AND ACETAMINOPHEN 5; 325 MG/1; MG/1
1 TABLET ORAL EVERY 6 HOURS PRN
Qty: 28 TABLET | Refills: 0 | Status: SHIPPED | OUTPATIENT
Start: 2023-11-15 | End: 2023-11-21 | Stop reason: SDUPTHER

## 2023-11-08 NOTE — TELEPHONE ENCOUNTER
Patient requesting refill on oxyCODONE-acetaminophen (PERCOCET) 5-325 mg   Last office visit 10/11/23   shows last refill on 10/16/23  Patient does not have a pain contract on file with [facility] department  Patient last UDS none on file was not consistent with current therapy

## 2023-11-14 ENCOUNTER — PATIENT MESSAGE (OUTPATIENT)
Dept: PAIN MEDICINE | Facility: CLINIC | Age: 63
End: 2023-11-14
Payer: MEDICARE

## 2023-11-15 ENCOUNTER — PATIENT MESSAGE (OUTPATIENT)
Dept: ORTHOPEDICS | Facility: CLINIC | Age: 63
End: 2023-11-15
Payer: MEDICARE

## 2023-11-16 ENCOUNTER — OFFICE VISIT (OUTPATIENT)
Dept: PODIATRY | Facility: CLINIC | Age: 63
End: 2023-11-16
Payer: MEDICARE

## 2023-11-16 VITALS — HEIGHT: 61 IN | BODY MASS INDEX: 25.81 KG/M2 | WEIGHT: 136.69 LBS

## 2023-11-16 DIAGNOSIS — M20.41 HAMMER TOE OF RIGHT FOOT: ICD-10-CM

## 2023-11-16 DIAGNOSIS — L97.511 ULCER OF RIGHT FOOT, LIMITED TO BREAKDOWN OF SKIN: Primary | ICD-10-CM

## 2023-11-16 DIAGNOSIS — M05.79 RHEUMATOID ARTHRITIS INVOLVING MULTIPLE SITES WITH POSITIVE RHEUMATOID FACTOR: ICD-10-CM

## 2023-11-16 PROCEDURE — 11042 WOUND DEBRIDEMENT: ICD-10-PCS | Mod: S$GLB,,, | Performed by: PODIATRIST

## 2023-11-16 PROCEDURE — 3008F PR BODY MASS INDEX (BMI) DOCUMENTED: ICD-10-PCS | Mod: CPTII,S$GLB,, | Performed by: PODIATRIST

## 2023-11-16 PROCEDURE — 11042 DBRDMT SUBQ TIS 1ST 20SQCM/<: CPT | Mod: S$GLB,,, | Performed by: PODIATRIST

## 2023-11-16 PROCEDURE — 1160F RVW MEDS BY RX/DR IN RCRD: CPT | Mod: CPTII,S$GLB,, | Performed by: PODIATRIST

## 2023-11-16 PROCEDURE — 99999 PR PBB SHADOW E&M-EST. PATIENT-LVL V: CPT | Mod: PBBFAC,,, | Performed by: PODIATRIST

## 2023-11-16 PROCEDURE — 1160F PR REVIEW ALL MEDS BY PRESCRIBER/CLIN PHARMACIST DOCUMENTED: ICD-10-PCS | Mod: CPTII,S$GLB,, | Performed by: PODIATRIST

## 2023-11-16 PROCEDURE — 1159F PR MEDICATION LIST DOCUMENTED IN MEDICAL RECORD: ICD-10-PCS | Mod: CPTII,S$GLB,, | Performed by: PODIATRIST

## 2023-11-16 PROCEDURE — 1159F MED LIST DOCD IN RCRD: CPT | Mod: CPTII,S$GLB,, | Performed by: PODIATRIST

## 2023-11-16 PROCEDURE — 3008F BODY MASS INDEX DOCD: CPT | Mod: CPTII,S$GLB,, | Performed by: PODIATRIST

## 2023-11-16 PROCEDURE — 99213 PR OFFICE/OUTPT VISIT, EST, LEVL III, 20-29 MIN: ICD-10-PCS | Mod: 25,S$GLB,, | Performed by: PODIATRIST

## 2023-11-16 PROCEDURE — 99999 PR PBB SHADOW E&M-EST. PATIENT-LVL V: ICD-10-PCS | Mod: PBBFAC,,, | Performed by: PODIATRIST

## 2023-11-16 PROCEDURE — 99213 OFFICE O/P EST LOW 20 MIN: CPT | Mod: 25,S$GLB,, | Performed by: PODIATRIST

## 2023-11-16 NOTE — PROGRESS NOTES
"[facility] PODIATRY  [address] [place] [address], [address]  [facility] [address]  Dept: [phone]  Dept Fax: [phone]    Isiah Mark Jr., DPM     Assessment:   MDM    Coding  1. Ulcer of right foot, limited to breakdown of skin  Wound Debridement      2. Rheumatoid arthritis involving multiple sites with positive rheumatoid factor        3. Hammer toe of right foot            Plan:     Wound Debridement    Date/Time: 11/16/2023 10:15 AM    Performed by: Isiah Mark Jr., DPM  Authorized by: Isiah Mark Jr., DPM    Time out: Immediately prior to procedure a "time out" was called to verify the correct patient, procedure, equipment, support staff and site/side marked as required.      Preparation: Patient was prepped and draped in usual sterile fashion    Local anesthesia used?: No      Wound Details:    Location:  Right foot    Location:  Right 3rd Toe    Type of Debridement:  Excisional       Length (cm):  0.3       Area (sq cm):  0.09       Width (cm):  0.3       Percent Debrided (%):  100       Depth (cm):  0.5       Total Area Debrided (sq cm):  0.09    Depth of debridement:  Subcutaneous tissue    Tissue debrided:  Epidermis and Subcutaneous    Devitalized tissue debrided:  Callus and Exudate    Instruments:  Curette  Bleeding:  Minimal  Hemostasis Achieved: Yes  Method Used:  Pressure and Silver Nitrate  Patient tolerance:  Patient tolerated the procedure well with no immediate complications    Joyce was seen today for toe pain.    Diagnoses and all orders for this visit:    Ulcer of right foot, limited to breakdown of skin  -     Wound Debridement    Rheumatoid arthritis involving multiple sites with positive rheumatoid factor    Hammer toe of right foot        -pt seen, evaluated, and managed  -dx discussed in detail. All questions/concerns addressed  -all tx options discussed. All alternatives, risks, benefits of all txs discussed  -the patient was educated about the " diagnosis  -clinically a R 3rd toe pressure ulceration from RA deformity of toe  -ulceration without s/s of infection, stable/improving  -px as above  -betadine DSD applied. Pt to change QD at home    -rxs dispensed: none  -referrals: none  -WB: wbat    Short-term goals include but are not limited to: maintaining good offloading and minimizing bioburden, promoting granulation and epithelialization to healing.  Wound healing is a medically reasonable expectation based on the clinical circumstances documented.    Long-term goals include but are not limited to: keeping the wound healed by good offloading and medical management under the direction of internist.    Advised pt of risks of current condition including but not limited to: worsening of infection, potential for limb loss    Follow-up: Patient is to return to the clinic in 4 week for follow-up but should call [facility] immediately if any signs of infection, such as fever, chills, sweats, increased redness or pain.    No follow-ups on file.    Subjective:      Patient ID: Joyce Peterson is a 63 y.o. female.    Chief Complaint:   Chief Complaint   Patient presents with    Toe Pain     3rd toe        CC - foot ulcer: Patient presents to the clinic for evaluation and treatment of high risk feet. The patient's chief complaint is foot ulcer, R foot. duration: several wks. denies n/v/f/c.    Of note - she is s/p left triple arthrodesis, midfoot arthrodesis, ARLEN, CBG on 1/11/23 w/ dr. Finnegan. Overall she is very happy with result of L foot reconstruction.      11/16/23:  Hx as above. F/u for ulceration R foot. Denies n/v/f/c    Foot Ulcer    Toe Pain         Last Podiatry Enc: Visit date not found  Last Enc w/ Me: Visit date not found    Outside reports reviewed: historical medical records.  Family hx: as below  Past Medical History:   Diagnosis Date    Acid reflux     Allergy     Anemia     Asthma     Coronary artery disease     CS (cervical spondylosis) 03/08/2013     Degenerative disc disease     Degenerative joint disease of hindfoot, left 6/28/2022    Dry eyes     Dry mouth     Gastroparesis     History of methotrexate therapy 01/19/2022    Hyperlipidemia     Lateral meniscus derangement 04/06/2016    Lobular carcinoma in situ     Lumbar spondylosis 03/08/2013    Osteoarthritis     Osteoporosis     Pes planovalgus, acquired, left 6/28/2022    Rheumatoid arthritis(714.0)     Rupture of left triceps tendon 10/17/2018    Umbilical hernia 08/13/2015     Past Surgical History:   Procedure Laterality Date    BREAST BIOPSY Left 01/29/2002    core bx    CARPAL TUNNEL RELEASE Right 05/2017    CHOLECYSTECTOMY  2004    COLONOSCOPY      10/11    COLONOSCOPY N/A 6/29/2017    Procedure: COLONOSCOPY;  Surgeon: López Moore MD;  Location: [facility] ENDO ([address] [place] [address]);  Service: Endoscopy;  Laterality: N/A;    EPIDURAL STEROID INJECTION N/A 11/18/2021    Procedure: INJECTION, STEROID, EPIDURAL, L5-S1 IL NEED CONSENT;  Surgeon: Tj Mayfield MD;  Location: [facility] PAIN MGT;  Service: Pain Management;  Laterality: N/A;    EPIDURAL STEROID INJECTION N/A 9/29/2022    Procedure: LUMBAR L3/L4 IL MADELINE CONTRAST;  Surgeon: Tj Mayfield MD;  Location: [facility] PAIN [facility]GT;  Service: Pain Management;  Laterality: N/A;    EPIDURAL STEROID INJECTION N/A 12/12/2022    Procedure: INJECTION, STEROID, EPIDURAL L5/S1 IL CONTRAST;  Surgeon: Tj Mayfield MD;  Location: [facility] PAIN [facility]GT;  Service: Pain Management;  Laterality: N/A;    EPIDURAL STEROID INJECTION N/A 4/6/2023    Procedure: INJECTION, STEROID, EPIDURAL L5/S1;  Surgeon: Tj Mayfield MD;  Location: [facility] PAIN MGT;  Service: Pain Management;  Laterality: N/A;    FOOT ARTHRODESIS Left 1/11/2023    Procedure: FUSION, FOOT;  Surgeon: Ariella Finnegan MD;  Location: [facility] OR;  Service: Orthopedics;  Laterality: Left;  nimbus    FUSION, SPINE, LUMBAR, TLIF, POSTERIOR APPROACH, USING PEDICLE SCREW N/A 6/22/2023    Procedure: FUSION, SPINE, LUMBAR, TLIF, POSTERIOR APPROACH, USING  PEDICLE SCREW L4/5 spinewave SNS:SSEP/EMG;  Surgeon: Jh Mosqueda MD;  [facility];  Service: Neurosurgery;  Laterality: N/A;    HARVESTING OF BONE GRAFT Left 1/11/2023    Procedure: SURGICAL PROCUREMENT, BONE GRAFT;  Surgeon: Ariella Finnegan MD;  Location: [facility] OR;  Service: Orthopedics;  Laterality: Left;    INJECTION OF ANESTHETIC AGENT AROUND NERVE Bilateral 8/23/2021    Procedure: BLOCK, NERVE, MEDIAL BRANCH L3,L4,L5;  Surgeon: Tj Mayfield MD;  Location: [facility]IN [facility]GT;  Service: Pain Management;  Laterality: Bilateral;  1 of 2    INJECTION OF ANESTHETIC AGENT AROUND NERVE Bilateral 8/26/2021    Procedure: BLOCK, NERVE, MEDIAL BRANCH L3,L4,L5;  Surgeon: Tj Mayfield MD;  Location: [facility];  Service: Pain Management;  Laterality: Bilateral;  2 of 2    INJECTION OF ANESTHETIC AGENT AROUND NERVE Left 7/7/2022    Procedure: BLOCK, NERVE, LEFT OBTURATOR AND FEMORAL;  Surgeon: Tj Mayfield MD;  Location: [facility];  Service: Pain Management;  Laterality: Left;    INJECTION OF FACET JOINT Bilateral 3/12/2020    Procedure: FACET JOINT INJECTION (LUMBAR BLOCK) BILATERAL L4-5 AND L5-S1 DIRECT REFERRAL;  Surgeon: Tj Mayfield MD;  Location: [facility];  Service: Pain Management;  Laterality: Bilateral;  NEEDS CONSENT    INJECTION OF FACET JOINT Bilateral 3/29/2021    Procedure: INJECTION, FACET JOINT L3/4, L4/5, L5/S1;  Surgeon: Tj Mayfield MD;  Location: [facility];  Service: Pain Management;  Laterality: Bilateral;    INJECTION OF JOINT Right 7/30/2020    Procedure: INJECTION, JOINT, RIGHT HIP Interarticular under flouro;  Surgeon: Tj Mayfield MD;  Location: [facility];  Service: Pain Management;  Laterality: Right;  INJECTION, JOINT, RIGHT HIP Interarticular under flouro    INJECTION OF JOINT Right 2/21/2022    Procedure: Injection, Joint RIGHT ISCHIAL BURSA;  Surgeon: Tj Mayfield MD;  Location: [facility];  Service: Pain Management;  Laterality: Right;    INTRAMEDULLARY  RODDING OF FEMUR Left 5/21/2019    Procedure: INSERTION, INTRAMEDULLARY ARIEL, FEMUR;  Surgeon: López Duff MD;  Location: [facility] OR 2ND FLR;  Service: Orthopedics;  Laterality: Left;    LENGTHENING OF TENDON OF LOWER EXTREMITY Left 1/11/2023    Procedure: LENGTHENING, TENDON, LOWER EXTREMITY;  Surgeon: Ariella Finnegan MD;  Location: [facility] OR;  Service: Orthopedics;  Laterality: Left;    MAGNETIC RESONANCE IMAGING N/A 5/29/2023    Procedure: MRI (Magnetic Resonance Imagine);  Surgeon: Sujey Surgeon;  Location: [facility]JEY;  Service: Anesthesiology;  Laterality: N/A;    RADIOFREQUENCY ABLATION Left 9/20/2021    Procedure: RADIOFREQUENCY ABLATION left L3,4,5 RFA  1 of 2  CONSENT NEEDED;  Surgeon: Tj Mayfield MD;  Location: [facility];  Service: Pain Management;  Laterality: Left;    RADIOFREQUENCY ABLATION Right 10/4/2021    Procedure: RADIOFREQUENCY ABLATION  right L3,4,5 RFA   2 of 2  CONSENT NEEDED;  Surgeon: Tj Mayfield MD;  Location: [facility] PAIN MGT;  Service: Pain Management;  Laterality: Right;    RADIOFREQUENCY ABLATION Left 4/21/2022    Procedure: Radiofrequency Ablation LEFT L3,L4,L5;  Surgeon: Tj Mayfield MD;  Location: [facility] PAIN MGT;  Service: Pain Management;  Laterality: Left;    RADIOFREQUENCY ABLATION Right 5/12/2022    Procedure: Radiofrequency Ablation RIGHT L3,L4,L5;  Surgeon: Tj Mayfield MD;  Location: [facility];  Service: Pain Management;  Laterality: Right;    RADIOFREQUENCY ABLATION Left 7/25/2022    Procedure: Radiofrequency Ablation Left Femoral & Obturator;  Surgeon: Tj Mayfield MD;  Location: [facility];  Service: Pain Management;  Laterality: Left;    REPAIR OF TRICEPS TENDON Left 10/17/2018    Procedure: REPAIR, TENDON, TRICEPS left elbow;  Surgeon: Staci Yarbrough MD;  Location: [facility] OR 1ST FLR;  Service: Orthopedics;  Laterality: Left;  Anesthesia: General and regional. PRONE, k-wire , hand pan 1 and pan 2, CALL ARTHREX/Tamera notified 10-12 LO    TONSILLECTOMY       TRANSFORAMINAL EPIDURAL INJECTION OF STEROID Right 8/31/2020    Procedure: INJECTION, STEROID, EPIDURAL, TRANSFORAMINAL,  APPROACH, L3-L4 and L4-L5 need consent;  Surgeon: Tj Mayfield MD;  Location: [facility] PAIN [facility]GT;  Service: Pain Management;  Laterality: Right;    TRANSFORAMINAL EPIDURAL INJECTION OF STEROID Bilateral 7/23/2021    Procedure: INJECTION, STEROID, EPIDURAL, TRANSFORAMINAL APPROACH L4/5;  Surgeon: Tj Mayfield MD;  Location: [facility]IN [facility]GT;  Service: Pain Management;  Laterality: Bilateral;    TRANSFORAMINAL EPIDURAL INJECTION OF STEROID Bilateral 9/25/2023    Procedure: LUMBAR TRANSFORAMINAL BILATERAL  L2/3 DIRECT REFERRAL;  Surgeon: Tj Mayfield MD;  Location: [facility] PAIN [facility]GT;  Service: Pain Management;  Laterality: Bilateral;    TRANSFORAMINAL EPIDURAL INJECTION OF STEROID Left 10/18/2023    Procedure: LUMBAR TRANSFORAMINAL LEFT L3/4 AND L4/5 * CLEARANCE IN CHART*;  Surgeon: Tj Mayfield MD;  Location: [facility] PAIN [facility]GT;  Service: Pain Management;  Laterality: Left;    TRIGGER POINT INJECTION N/A 7/23/2021    Procedure: INJECTION, TRIGGER POINT SCAPULAR;  Surgeon: Tj Mayfield MD;  Location: [facility] PAIN MGT;  Service: Pain Management;  Laterality: N/A;    TUBAL LIGATION  2003    UPPER GASTROINTESTINAL ENDOSCOPY      10/11    uterine ablation  2003     Family History   Problem Relation Age of Onset    Cancer Mother         Lung Cancer    Emphysema Mother     Heart attack Mother     Alcohol abuse Mother     Cancer Father         Lung Cancer    Osteoarthritis Father     Lung cancer Father     Skin cancer Father     Alcohol abuse Father     Heart disease Brother     Heart attack Brother     Alcohol abuse Brother     Cirrhosis Brother     Osteoarthritis Paternal Aunt     Retinal detachment Maternal Aunt     No Known Problems Sister     No Known Problems Maternal Uncle     No Known Problems Paternal Uncle     No Known Problems Maternal Grandmother     No Known Problems Maternal Grandfather     No Known  Problems Paternal Grandmother     No Known Problems Paternal Grandfather     Colon cancer Neg Hx     Esophageal cancer Neg Hx     Stomach cancer Neg Hx     Celiac disease Neg Hx     Diabetes Neg Hx     Thyroid disease Neg Hx     Amblyopia Neg Hx     Blindness Neg Hx     Cataracts Neg Hx     Glaucoma Neg Hx     Hypertension Neg Hx     Macular degeneration Neg Hx     Strabismus Neg Hx     Stroke Neg Hx      Current Outpatient Medications   Medication Sig Dispense Refill    albuterol (PROVENTIL/VENTOLIN HFA) 90 mcg/actuation inhaler Inhale 2 puffs into the lungs every 6 (six) hours as needed. Rescue 20.1 g 11    azelastine (ASTELIN) 137 mcg (0.1 %) nasal spray Use 1 spray (137 mcg total) in each nostril 2 (two) times daily. 30 mL 1    budesonide-formoterol 160-4.5 mcg (SYMBICORT) 160-4.5 mcg/actuation HFAA Inhale 2 puffs into the lungs every 12 (twelve) hours. 30.6 g 3    calcium citrate-vitamin D3 315-200 mg (CITRACAL+D) 315 mg-5 mcg (200 unit) per tablet Take 1 tablet by mouth 2 (two) times daily.       cycloSPORINE (RESTASIS) 0.05 % ophthalmic emulsion PLACE 1 DROP INTO BOTH EYES TWICE A  each 3    diclofenac sodium (VOLTAREN) 1 % Gel APPLY 2 GRAMS TOPICALLY 4 (FOUR) TIMES DAILY AS NEEDED. 300 g 2    fluconazole (DIFLUCAN) 150 MG Tab Take 150 mg by mouth as needed.      fluticasone propionate (FLONASE) 50 mcg/actuation nasal spray Use 1 spray (50 mcg total) in each nostril once daily. 16 g 1    INV PROPULSID 10 MG Take 2 tablets (20 mg) by mouth 4 (four) times daily. FOR INVESTIGATIONAL USE ONLY. Protocol [id number] Subject ID: [id number]. 1440 each 0    leflunomide (ARAVA) 20 MG Tab Take 1 tablet (20 mg total) by mouth once daily. 90 tablet 0    lifitegrast (XIIDRA) 5 % Dpet INSTILL ONE DROP INTO BOTH EYES TWICE A DAY 60 mL 10    methenamine (HIPREX) 1 gram Tab Take 1 tablet (1 g total) by mouth 2 (two) times daily. 180 tablet 3    methocarbamoL (ROBAXIN) 750 MG Tab Take 1 tablet (750 mg total) by mouth 3  (three) times daily as needed. 90 tablet 1    mirabegron (MYRBETRIQ) 25 mg Tb24 ER tablet Take 1 tablet (25 mg total) by mouth once daily. 90 tablet 3    montelukast (SINGULAIR) 10 mg tablet Take 1 tablet (10 mg total) by mouth every evening. 90 tablet 3    naproxen (NAPROSYN) 500 MG tablet TAKE 1 TABLET (500 MG TOTAL) BY MOUTH 2 (TWO) TIMES DAILY AS NEEDED 180 tablet 1    omeprazole (PRILOSEC) 40 MG capsule Take 1 capsule (40 mg total) by mouth every morning. 30 capsule 6    omeprazole-sodium bicarbonate (ZEGERID) 40-1.1 mg-gram per capsule TAKE 1 CAPSULE BY MOUTH EVERY DAY IN THE MORNING 90 capsule 3    oxyCODONE-acetaminophen (PERCOCET) 5-325 mg per tablet Take 1 tablet by mouth every 6 (six) hours as needed for Pain. 28 tablet 0    POTASSIUM ORAL Take by mouth once daily.      predniSONE (DELTASONE) 5 MG tablet TAKE 1 TABLET BY MOUTH EVERY DAY 90 tablet 1    pregabalin (LYRICA) 150 MG capsule Take 1 capsule (150 mg total) by mouth 3 (three) times daily. 90 capsule 2    promethazine (PHENERGAN) 25 MG tablet Take 1 tablet (25 mg total) by mouth every 6 (six) hours as needed for Nausea. 15 tablet 0    rosuvastatin (CRESTOR) 20 MG tablet Take 1 tablet by mouth every day in the evening 90 tablet 3    sarilumab (KEVZARA) 200 mg/1.14 mL Syrg Inject 1.14 mL (200 mg total) into the skin every 14 (fourteen) days. 2.28 mL 1    sucralfate (CARAFATE) 1 gram tablet TAKE 1 TABLET (1 G TOTAL) BY MOUTH 4 (FOUR) TIMES DAILY. 360 tablet 2    albuterol-ipratropium (DUO-NEB) 2.5 mg-0.5 mg/3 mL nebulizer solution Take 3 mLs by nebulization every 6 (six) hours as needed for Wheezing. Rescue 90 mL 3     No current facility-administered medications for this visit.     Review of patient's allergies indicates:   Allergen Reactions    Actemra [tocilizumab] Other (See Comments)     Severe dizziness    Codeine Nausea And Vomiting and Hives    Gold au 198 Hives and Rash    Hydroxychloroquine Other (See Comments)     Can't remember the  "reaction      Iodinated contrast media Other (See Comments)     Other reaction(s): BURNING ALL OVER    Iodine Other (See Comments) and Hives     Other reaction(s): BURNING ALL OVER - Iodine dye - Not topical    Ondansetron Nausea And Vomiting    Sulfa (sulfonamide antibiotics) Other (See Comments)     Can't remember the reaction    Zofran [ondansetron hcl (pf)] Nausea And Vomiting     Pt reports that last time she received zofran she started vomiting again    Methotrexate analogues Nausea Only    Pneumococcal vaccine Other (See Comments)    Pneumovax 23 [pneumococcal 23-argenis ps vaccine] Other (See Comments)     "sick"    Methotrexate Nausea Only     Social History     Socioeconomic History    Marital status:    Tobacco Use    Smoking status: Never    Smokeless tobacco: Never   Substance and Sexual Activity    Alcohol use: Yes     Comment: 2-3 times per year.    Drug use: No    Sexual activity: Yes     Partners: Male     Birth control/protection: Surgical     Social Determinants of Health     Financial Resource Strain: Low Risk  (9/6/2023)    Overall Financial Resource Strain (CARDIA)     Difficulty of Paying Living Expenses: Not hard at all   Food Insecurity: No Food Insecurity (9/6/2023)    Hunger Vital Sign     Worried About Running Out of Food in the Last Year: Never true     Ran Out of Food in the Last Year: Never true   Transportation Needs: No Transportation Needs (9/6/2023)    PRAPARE - Transportation     Lack of Transportation (Medical): No     Lack of Transportation (Non-Medical): No   Physical Activity: Inactive (9/6/2023)    Exercise Vital Sign     Days of Exercise per Week: 0 days     Minutes of Exercise per Session: 0 min   Stress: No Stress Concern Present (9/6/2023)    [place] citizen [place] [employer] - Occupational Stress Questionnaire     Feeling of Stress : Not at all   Social Connections: Socially Integrated (9/6/2023)    Social Connection and Isolation Panel [NHANES]     Frequency " "of Communication with Friends and Family: Twice a week     Frequency of Social Gatherings with Friends and Family: More than three times a week     Attends Sabianism Services: More than 4 times per year     Active Member of Clubs or Organizations: Yes     Attends Club or Organization Meetings: More than 4 times per year     Marital Status:    Housing Stability: Low Risk  (9/6/2023)    Housing Stability Vital Sign     Unable to Pay for Housing in the Last Year: No     Number of Places Lived in the Last Year: 1     Unstable Housing in the Last Year: No       ROS    REVIEW OF SYSTEMS: Negative as documented below as well as positive findings in bold.       Constitutional  Respiratory  Gastrointestinal  Skin   - Fever - Cough - Heartburn - Rash   - Chills - Spit blood - Nausea - Itching   - Weight Loss - Shortness of breath - Vomiting - Nail pain   - Malaise/Fatigue - Wheezing - Abdominal Pain  Wound/Ulcer   - Weight Gain   - Blood in Stool  Poor wound healing       - Diarrhea          Cardiovascular  Genitourinary  Neurological  HEENT   - Chest Pain - Dysuria - Burning Sensation of feet - Headache   - Palpitations - Hematuria - Tingling / Paresthesia - Congestion   - Pain at night in legs - Flank Pain - Dizziness - Sore Throat   - Cramping   - Tremor - Blurred Vision   - Leg Swelling   - Sensory Change - Double Vision   - Dizzy when standing   - Speech Change - Eye Redness       - Focal Weakness - Dry Eyes       - Loss of Consciousness          Endocrine  Musculoskeletal  Psychiatric   - Cold intolerance - Muscle Pain - Depression   - Heat intolerance - Neck Pain - Insomnia   - Anemia - Joint Pain - Memory Loss   -  Easy bruising, bleeding - Heel pain - Anxiety      Toe Pain        Leg/Ankle/Foot Pain         Objective:     Ht 5' 1" (1.549 m)   Wt 62 kg (136 lb 11 oz)   LMP  (LMP Unknown)   BMI 25.83 kg/m²   Vitals:    11/16/23 1014   Weight: 62 kg (136 lb 11 oz)   Height: 5' 1" (1.549 m)   PainSc: 0-No pain "         Physical Exam    General Appearance:   Patient appears well developed, well nourished  Patient appears stated age    Psychiatric:   Patient is oriented to time, place, and person.  Patient has appropriate mood and affect    Neck:  Trachea Midline  No visible masses    Respiratory/Ears:  No distress or labored breathing.  Able to differentiate between normal talking voice and whisper.  Able to follow commands    Eyes:  Visual Acuity intact  Lids and conjunctivae normal. No discoloration noted.    Physical Exam  Ortho Exam  Ortho/SPM Exam  Foot Exam  Physical Exam  Neurologic Exam    R LE exam con't:  V:  DP 1/4, PT 2/4   CRT< 3s to all digits tested   Tibial and popliteal lymph nodes are w/o abnormality    No edema, + VV    N:  Patient displays normal ankle reflexes   SILT in SP/DP/T/Opal/Saph distributions    Ortho: +Motor EHL/FHL/TA/GA   +TTP R 3rd toe   Compartments soft/compressible. No pain on passive stretch of big toe. No calf  Pain.   hammertoe deformity present 3rd toe    Derm:  skin not intact, ulceration present,  ulcer probes to subQ    Ulcer Location: 3rd toe medial  Measurements (post-debridement): 0.3x0.3x0.5cm  Periwound: hyperkeratotic  Drainage: none  Malodor: none  Base:  fibrotic  Signs of infection: none    Imaging / Labs:    Hemoglobin A1C   Date Value Ref Range Status   03/15/2022 5.2 4.0 - 5.6 % Final     Comment:     ADA Screening Guidelines:  5.7-6.4%  Consistent with prediabetes  >or=6.5%  Consistent with diabetes    High levels of fetal hemoglobin interfere with the HbA1C  assay. Heterozygous hemoglobin variants (HbS, HgC, etc)do  not significantly interfere with this assay.   However, presence of multiple variants may affect accuracy.     02/06/2017 6.0 4.5 - 6.2 % Final     Comment:     According to ADA guidelines, hemoglobin A1C <7.0% represents  optimal control in non-pregnant diabetic patients.  Different  metrics may apply to specific populations.   Standards of Medical Care  in Diabetes - 2016.  For the purpose of screening for the presence of diabetes:  <5.7%     Consistent with the absence of diabetes  5.7-6.4%  Consistent with increasing risk for diabetes   (prediabetes)  >or=6.5%  Consistent with diabetes  Currently no consensus exists for use of hemoglobin A1C  for diagnosis of diabetes for children.     09/28/2015 5.6 4.5 - 6.2 % Final           Note: This was dictated using a computer transcription program. Although proofread, it may contain computer transcription errors and phonetic errors. Other human proofreading errors may also exist. Corrections may be performed at a later time. Please contact us for any clarification if needed.    Isiah Mark DPM  [facility]

## 2023-11-16 NOTE — PATIENT INSTRUCTIONS
Rheumatoid Arthritis in the Foot and Ankle    What Is Rheumatoid Arthritis?     Rheumatoid arthritis (RA) is a disease in which certain cells of the immune system malfunction and attack healthy joints.    RA causes inflammation in the lining (synovium) of joints, most often the joints of the hands and feet. The signs of inflammation can include pain, swelling, redness and a feeling of warmth around affected joints. In some patients, chronic inflammation results in damage to the cartilage and bones in the joint. Serious damage can lead to permanent joint destruction, deformity and disability.    When joints become inflamed due to RA, the synovium thickens and produces an excess of joint fluid. This overabundance of fluid, along with inflammatory chemicals released by the immune system, causes swelling and damage to the joints cartilage and bones.    Symptoms Affecting the Foot & Ankle  Foot problems caused by RA commonly occur in the forefoot (the ball of the foot, near the toes), although RA can also affect other areas of the foot and ankle. The most common signs and symptoms of RA-related foot problems, in addition to the abnormal appearance of deformities, are pain, swelling, joint stiffness and difficulty walking.    Deformities and conditions associated with RA may include:    Rheumatoid nodules (lumps), which cause pain when they rub against shoes or, if they appear on the bottom of the foot, pain when walking  Dislocated toe joints  Hammertoes  Bunions  Heel pain  Achilles tendon pain  Flatfoot ankle pain     Diagnosis  RA is diagnosed on the basis of a clinical examination as well as blood tests.    To further evaluate the patients foot and ankle problems, the surgeon may order x-rays and/or other imaging tests.    Treatment by the Foot & Ankle Surgeon  While treatment of RA focuses on the medication prescribed by a patient's primary doctor or rheumatologist, the foot and ankle surgeon will develop a  treatment plan aimed at relieving the pain of RA-related foot problems. The plan may include one or more of the following options:    Orthotic devices. The surgeon often fits the patient with custom orthotic devices to provide cushioning for rheumatoid nodules, minimize pain when walking and give needed support to improve the foots mechanics.  Accommodative shoes. These are used to relieve pressure and pain and to assist with walking.  Aspiration of fluid. When inflammation flares up in a joint, the surgeon may aspirate (draw out) fluid to reduce the swelling and pain.  Steroid injections. Injections of anti-inflammatory medication may be applied directly to an inflamed joint or to a rheumatoid nodule.     When Is Surgery Needed?  When RA produces pain and deformity in the foot that is not relieved through other treatments, surgery may be required. The foot and ankle surgeon will select the procedure best suited to the patient's condition and lifestyle.      Ulcers    Ulcers, which are open sores in the skin, occur when the outer layers of the skin are injured and the deeper tissues become exposed. They can be caused by excess pressure due to ill-fitting shoes, long periods in bed or after an injury that breaks the skin. Ulcers are commonly seen in patients living with diabetes, neuropathy or vascular disease. Open wounds can put patients at increased risk of developing infection in the skin and bone.    The signs and symptoms of ulcers may include drainage, odor or red, inflamed, thickened tissue. Pain may or may not be present.    Diagnosis may include x-rays to evaluate possible bone involvement. Other advanced imaging studies may also be ordered to evaluate for vascular disease, which may affect a patients ability to heal the wound.    Ulcers are treated by removing the unhealthy tissue and performing local wound care to assist in healing. Special shoes or padding may be used to remove excess pressure on the  area. If infection is present, antibiotics will be necessary. In severe cases that involve extensive infection or are slow to heal, surgery or other advanced wound care treatments may be necessary.          Force or friction against the bottom of your foot causes the skin to thicken. This thick skin is called a callus. If the skin keeps thickening, the callus presses up into the foot. A callus pressing into the foot may harm healthy tissue. This can cause a pressure injury (ulcer). As healthy skin dies, a pressure injury forms. Pressure injuries may change from hot spots to infected wounds very quickly.     But you may not notice the pain if you have nerve damage (neuropathy). This health problem limits the feeling in your feet. Diabetes is a common cause of neuropathy and neuropathic ulceration.    Red hot spots on the skin are signs of pressure or friction. They are a warning to take care of your feet. A hot spot is likely to blister if the pressure is not eased. Left untreated, a blister can turn into an open wound, a corn, or a callus. A corn is thickened skin on top of the foot. Surgery to fix bunions, claw toes, or hammertoes may ease pressure on hot spots and pressure injuries. Knee walkers can also let you to get around without putting pressure on the affected foot. Special therapeutic shoes can also help ease pressure.    If a corn or callus presses into the foot, it destroys inner layers of skin and fat. Cracks and sores may form. These open wounds are pressure injuries. They can let infection enter the body. In some cases, dead skin, such as a corn or callus, may cover an open wound. This can make it harder to see.    If bacteria enter the pressure injury, infection sets in. This causes healthier tissue to die. The infected pressure injury may start to drain. The discharge may be white, yellow, or green. Some infected pressure injuries bleed or have a bad odor. The skin around an infected pressure injury  may become red or warm. Call your healthcare provider right away if you think you have an infected pressure injury.    You will need follow-up visits to your healthcare provider even if your pressure injury goes away. This is because the affected area stays weak. There may be underlying weaknesses that need to be treated, such as poor circulation. The chance of a new pressure injury is high. Check your feet each day for signs of pressure injuries. Use a mirror to check the bottom of your feet. Take action if you see changes.

## 2023-11-18 DIAGNOSIS — M19.90 OSTEOARTHRITIS, UNSPECIFIED OSTEOARTHRITIS TYPE, UNSPECIFIED SITE: ICD-10-CM

## 2023-11-20 ENCOUNTER — PATIENT MESSAGE (OUTPATIENT)
Dept: PAIN MEDICINE | Facility: CLINIC | Age: 63
End: 2023-11-20
Payer: MEDICARE

## 2023-11-20 DIAGNOSIS — Z98.890 S/P SPINAL SURGERY: Primary | ICD-10-CM

## 2023-11-20 RX ORDER — DICLOFENAC SODIUM 10 MG/G
GEL TOPICAL
Qty: 300 G | Refills: 2 | Status: SHIPPED | OUTPATIENT
Start: 2023-11-20

## 2023-11-20 NOTE — TELEPHONE ENCOUNTER
Patient requesting refill on Percocet 5-325 mg  Last office visit 10/11/23   shows last refill on 10/16/23  Patient does not have a pain contract on file with [facility] department  Patient last UDS none on file was not consistent with current therapy

## 2023-11-21 RX ORDER — OXYCODONE AND ACETAMINOPHEN 5; 325 MG/1; MG/1
1 TABLET ORAL EVERY 6 HOURS PRN
Qty: 28 TABLET | Refills: 0 | Status: SHIPPED | OUTPATIENT
Start: 2023-11-21 | End: 2023-11-28 | Stop reason: SDUPTHER

## 2023-11-24 DIAGNOSIS — M79.7 FIBROMYALGIA: ICD-10-CM

## 2023-11-27 RX ORDER — PREGABALIN 150 MG/1
150 CAPSULE ORAL 3 TIMES DAILY
Qty: 90 CAPSULE | Refills: 2 | Status: SHIPPED | OUTPATIENT
Start: 2023-11-27 | End: 2024-02-28 | Stop reason: SDUPTHER

## 2023-11-28 ENCOUNTER — OFFICE VISIT (OUTPATIENT)
Dept: PAIN MEDICINE | Facility: CLINIC | Age: 63
End: 2023-11-28
Attending: ANESTHESIOLOGY
Payer: MEDICARE

## 2023-11-28 VITALS
WEIGHT: 138.69 LBS | SYSTOLIC BLOOD PRESSURE: 137 MMHG | RESPIRATION RATE: 18 BRPM | HEART RATE: 84 BPM | BODY MASS INDEX: 26.2 KG/M2 | DIASTOLIC BLOOD PRESSURE: 82 MMHG | OXYGEN SATURATION: 98 % | TEMPERATURE: 98 F

## 2023-11-28 DIAGNOSIS — M70.60 TROCHANTERIC BURSITIS, UNSPECIFIED LATERALITY: ICD-10-CM

## 2023-11-28 DIAGNOSIS — M96.1 POSTLAMINECTOMY SYNDROME OF LUMBAR REGION: Primary | ICD-10-CM

## 2023-11-28 DIAGNOSIS — Z98.890 S/P SPINAL SURGERY: ICD-10-CM

## 2023-11-28 DIAGNOSIS — M53.3 SACROILIAC DYSFUNCTION: ICD-10-CM

## 2023-11-28 PROCEDURE — 99214 OFFICE O/P EST MOD 30 MIN: CPT | Mod: S$GLB,,, | Performed by: ANESTHESIOLOGY

## 2023-11-28 PROCEDURE — 1159F MED LIST DOCD IN RCRD: CPT | Mod: CPTII,S$GLB,, | Performed by: ANESTHESIOLOGY

## 2023-11-28 PROCEDURE — 1160F RVW MEDS BY RX/DR IN RCRD: CPT | Mod: CPTII,S$GLB,, | Performed by: ANESTHESIOLOGY

## 2023-11-28 PROCEDURE — 3075F PR MOST RECENT SYSTOLIC BLOOD PRESS GE 130-139MM HG: ICD-10-PCS | Mod: CPTII,S$GLB,, | Performed by: ANESTHESIOLOGY

## 2023-11-28 PROCEDURE — 3008F BODY MASS INDEX DOCD: CPT | Mod: CPTII,S$GLB,, | Performed by: ANESTHESIOLOGY

## 2023-11-28 PROCEDURE — 1159F PR MEDICATION LIST DOCUMENTED IN MEDICAL RECORD: ICD-10-PCS | Mod: CPTII,S$GLB,, | Performed by: ANESTHESIOLOGY

## 2023-11-28 PROCEDURE — 3008F PR BODY MASS INDEX (BMI) DOCUMENTED: ICD-10-PCS | Mod: CPTII,S$GLB,, | Performed by: ANESTHESIOLOGY

## 2023-11-28 PROCEDURE — 1160F PR REVIEW ALL MEDS BY PRESCRIBER/CLIN PHARMACIST DOCUMENTED: ICD-10-PCS | Mod: CPTII,S$GLB,, | Performed by: ANESTHESIOLOGY

## 2023-11-28 PROCEDURE — 99214 PR OFFICE/OUTPT VISIT, EST, LEVL IV, 30-39 MIN: ICD-10-PCS | Mod: S$GLB,,, | Performed by: ANESTHESIOLOGY

## 2023-11-28 PROCEDURE — 3075F SYST BP GE 130 - 139MM HG: CPT | Mod: CPTII,S$GLB,, | Performed by: ANESTHESIOLOGY

## 2023-11-28 PROCEDURE — 99999 PR PBB SHADOW E&M-EST. PATIENT-LVL III: ICD-10-PCS | Mod: PBBFAC,,, | Performed by: ANESTHESIOLOGY

## 2023-11-28 PROCEDURE — 3079F DIAST BP 80-89 MM HG: CPT | Mod: CPTII,S$GLB,, | Performed by: ANESTHESIOLOGY

## 2023-11-28 PROCEDURE — 3079F PR MOST RECENT DIASTOLIC BLOOD PRESSURE 80-89 MM HG: ICD-10-PCS | Mod: CPTII,S$GLB,, | Performed by: ANESTHESIOLOGY

## 2023-11-28 PROCEDURE — 99999 PR PBB SHADOW E&M-EST. PATIENT-LVL III: CPT | Mod: PBBFAC,,, | Performed by: ANESTHESIOLOGY

## 2023-11-28 RX ORDER — OXYCODONE AND ACETAMINOPHEN 5; 325 MG/1; MG/1
1 TABLET ORAL EVERY 6 HOURS PRN
Qty: 120 TABLET | Refills: 0 | Status: SHIPPED | OUTPATIENT
Start: 2023-11-28 | End: 2023-12-20 | Stop reason: SDUPTHER

## 2023-11-28 NOTE — PROGRESS NOTES
Subjective:      Patient ID: Joyce Peterson is a 63 y.o. female.    Chief Complaint: No chief complaint on file.    Referred by: No ref. provider found     HPI  She returns for follow-up suffering with lateral hip pain and anterior hip pain.  This has not responded to radiofrequency ablation in the past.  She also has bilateral sacroiliac joint pain and some radicular component.  Lower back pain across her back.  Pain seems to be better than before per her own words but still wakes her up with severe intractable pain.  No new symptomatology.  No changes    Past Medical History:   Diagnosis Date    Acid reflux     Allergy     Anemia     Asthma     Coronary artery disease     CS (cervical spondylosis) 03/08/2013    Degenerative disc disease     Degenerative joint disease of hindfoot, left 6/28/2022    Dry eyes     Dry mouth     Gastroparesis     History of methotrexate therapy 01/19/2022    Hyperlipidemia     Lateral meniscus derangement 04/06/2016    Lobular carcinoma in situ     Lumbar spondylosis 03/08/2013    Osteoarthritis     Osteoporosis     Pes planovalgus, acquired, left 6/28/2022    Rheumatoid arthritis(714.0)     Rupture of left triceps tendon 10/17/2018    Umbilical hernia 08/13/2015       Past Surgical History:   Procedure Laterality Date    BREAST BIOPSY Left 01/29/2002    core bx    CARPAL TUNNEL RELEASE Right 05/2017    CHOLECYSTECTOMY  2004    COLONOSCOPY      10/11    COLONOSCOPY N/A 6/29/2017    Procedure: COLONOSCOPY;  Surgeon: López Moore MD;  Location: [facility] ENDO ([address] [place] [address]);  Service: Endoscopy;  Laterality: N/A;    EPIDURAL STEROID INJECTION N/A 11/18/2021    Procedure: INJECTION, STEROID, EPIDURAL, L5-S1 IL NEED CONSENT;  Surgeon: Tj Mayfield MD;  Location: [facility] PAIN MGT;  Service: Pain Management;  Laterality: N/A;    EPIDURAL STEROID INJECTION N/A 9/29/2022    Procedure: LUMBAR L3/L4 IL MADELINE CONTRAST;  Surgeon: Tj Mayfield MD;  Location: [facility] PAIN MGT;  Service: Pain Management;   Laterality: N/A;    EPIDURAL STEROID INJECTION N/A 12/12/2022    Procedure: INJECTION, STEROID, EPIDURAL L5/S1 IL CONTRAST;  Surgeon: Tj Mayfield MD;  Location: [facility] PAIN MGT;  Service: Pain Management;  Laterality: N/A;    EPIDURAL STEROID INJECTION N/A 4/6/2023    Procedure: INJECTION, STEROID, EPIDURAL L5/S1;  Surgeon: Tj Mayfield MD;  Location: [facility] PAIN MGT;  Service: Pain Management;  Laterality: N/A;    FOOT ARTHRODESIS Left 1/11/2023    Procedure: FUSION, FOOT;  Surgeon: Ariella Finnegan MD;  Location: [facility] OR;  Service: Orthopedics;  Laterality: Left;  nimbus    FUSION, SPINE, LUMBAR, TLIF, POSTERIOR APPROACH, USING PEDICLE SCREW N/A 6/22/2023    Procedure: FUSION, SPINE, LUMBAR, TLIF, POSTERIOR APPROACH, USING PEDICLE SCREW L4/5 spinewave SNS:SSEP/EMG;  Surgeon: Jh Mosqueda MD;  Location: [address];  Service: Neurosurgery;  Laterality: N/A;    HARVESTING OF BONE GRAFT Left 1/11/2023    Procedure: SURGICAL PROCUREMENT, BONE GRAFT;  Surgeon: Ariella Finnegan MD;  Location: [facility] OR;  Service: Orthopedics;  Laterality: Left;    INJECTION OF ANESTHETIC AGENT AROUND NERVE Bilateral 8/23/2021    Procedure: BLOCK, NERVE, MEDIAL BRANCH L3,L4,L5;  Surgeon: Tj Mayfield MD;  Location: [facility] PAIN MGT;  Service: Pain Management;  Laterality: Bilateral;  1 of 2    INJECTION OF ANESTHETIC AGENT AROUND NERVE Bilateral 8/26/2021    Procedure: BLOCK, NERVE, MEDIAL BRANCH L3,L4,L5;  Surgeon: Tj Mayfield MD;  Location: [facility] PAIN MGT;  Service: Pain Management;  Laterality: Bilateral;  2 of 2    INJECTION OF ANESTHETIC AGENT AROUND NERVE Left 7/7/2022    Procedure: BLOCK, NERVE, LEFT OBTURATOR AND FEMORAL;  Surgeon: Tj Mayfield MD;  Location: [facility] PAIN MGT;  Service: Pain Management;  Laterality: Left;    INJECTION OF FACET JOINT Bilateral 3/12/2020    Procedure: FACET JOINT INJECTION (LUMBAR BLOCK) BILATERAL L4-5 AND L5-S1 DIRECT REFERRAL;  Surgeon: Tj Mayfield MD;  Location: [facility] PAIN MGT;  Service: Pain  Management;  Laterality: Bilateral;  NEEDS CONSENT    INJECTION OF FACET JOINT Bilateral 3/29/2021    Procedure: INJECTION, FACET JOINT L3/4, L4/5, L5/S1;  Surgeon: Tj Mayfield MD;  Location: [facility] PAIN MGT;  Service: Pain Management;  Laterality: Bilateral;    INJECTION OF JOINT Right 7/30/2020    Procedure: INJECTION, JOINT, RIGHT HIP Interarticular under flouro;  Surgeon: Tj Mayfield MD;  Location: [facility] PAIN MGT;  Service: Pain Management;  Laterality: Right;  INJECTION, JOINT, RIGHT HIP Interarticular under flouro    INJECTION OF JOINT Right 2/21/2022    Procedure: Injection, Joint RIGHT ISCHIAL BURSA;  Surgeon: Tj Mayfield MD;  Location: [facility] PAIN MGT;  Service: Pain Management;  Laterality: Right;    INTRAMEDULLARY RODDING OF FEMUR Left 5/21/2019    Procedure: INSERTION, INTRAMEDULLARY ARIEL, FEMUR;  Surgeon: López Duff MD;  Location: [address];  Service: Orthopedics;  Laterality: Left;    LENGTHENING OF TENDON OF LOWER EXTREMITY Left 1/11/2023    Procedure: LENGTHENING, TENDON, LOWER EXTREMITY;  Surgeon: Ariella Finnegan MD;  Location: [facility];  Service: Orthopedics;  Laterality: Left;    MAGNETIC RESONANCE IMAGING N/A 5/29/2023    Procedure: MRI (Magnetic Resonance Imagine);  Surgeon: Sujey Robin;  Location: [facility];  Service: Anesthesiology;  Laterality: N/A;    RADIOFREQUENCY ABLATION Left 9/20/2021    Procedure: RADIOFREQUENCY ABLATION left L3,4,5 RFA  1 of 2  CONSENT NEEDED;  Surgeon: Tj Mayfield MD;  Location: [facility] PAIN MGT;  Service: Pain Management;  Laterality: Left;    RADIOFREQUENCY ABLATION Right 10/4/2021    Procedure: RADIOFREQUENCY ABLATION  right L3,4,5 RFA   2 of 2  CONSENT NEEDED;  Surgeon: Tj Mayfield MD;  Location: [facility] PAIN MGT;  Service: Pain Management;  Laterality: Right;    RADIOFREQUENCY ABLATION Left 4/21/2022    Procedure: Radiofrequency Ablation LEFT L3,L4,L5;  Surgeon: Tj Mayfield MD;  Location: [facility] PAIN MGT;  Service: Pain Management;  Laterality: Left;     RADIOFREQUENCY ABLATION Right 5/12/2022    Procedure: Radiofrequency Ablation RIGHT L3,L4,L5;  Surgeon: Tj Mayfield MD;  Location: [facility] PAIN MGT;  Service: Pain Management;  Laterality: Right;    RADIOFREQUENCY ABLATION Left 7/25/2022    Procedure: Radiofrequency Ablation Left Femoral & Obturator;  Surgeon: Tj Mayfield MD;  Location: [facility] PAIN MGT;  Service: Pain Management;  Laterality: Left;    REPAIR OF TRICEPS TENDON Left 10/17/2018    Procedure: REPAIR, TENDON, TRICEPS left elbow;  Surgeon: Staci Yarbrough MD;  Location: [facility] OR 1ST FLR;  Service: Orthopedics;  Laterality: Left;  Anesthesia: General and regional. PRONE, k-wire , hand pan 1 and pan 2, CALL ARTHREX/Tamera notified 10-12 LO    TONSILLECTOMY      TRANSFORAMINAL EPIDURAL INJECTION OF STEROID Right 8/31/2020    Procedure: INJECTION, STEROID, EPIDURAL, TRANSFORAMINAL,  APPROACH, L3-L4 and L4-L5 need consent;  Surgeon: Tj Mayfield MD;  Location: [facility] PAIN MGT;  Service: Pain Management;  Laterality: Right;    TRANSFORAMINAL EPIDURAL INJECTION OF STEROID Bilateral 7/23/2021    Procedure: INJECTION, STEROID, EPIDURAL, TRANSFORAMINAL APPROACH L4/5;  Surgeon: Tj Mayfield MD;  Location: [facility] PAIN MGT;  Service: Pain Management;  Laterality: Bilateral;    TRANSFORAMINAL EPIDURAL INJECTION OF STEROID Bilateral 9/25/2023    Procedure: LUMBAR TRANSFORAMINAL BILATERAL  L2/3 DIRECT REFERRAL;  Surgeon: Tj Mayfield MD;  Location: [facility] PAIN MGT;  Service: Pain Management;  Laterality: Bilateral;    TRANSFORAMINAL EPIDURAL INJECTION OF STEROID Left 10/18/2023    Procedure: LUMBAR TRANSFORAMINAL LEFT L3/4 AND L4/5 * CLEARANCE IN CHART*;  Surgeon: Tj Mayfield MD;  Location: [facility] PAIN MGT;  Service: Pain Management;  Laterality: Left;    TRIGGER POINT INJECTION N/A 7/23/2021    Procedure: INJECTION, TRIGGER POINT SCAPULAR;  Surgeon: Tj Mayfield MD;  Location: [facility] PAIN MGT;  Service: Pain Management;  Laterality: N/A;    TUBAL LIGATION  2003  "   UPPER GASTROINTESTINAL ENDOSCOPY      10/11    uterine ablation  2003       Review of patient's allergies indicates:   Allergen Reactions    Actemra [tocilizumab] Other (See Comments)     Severe dizziness    Codeine Nausea And Vomiting and Hives    Gold au 198 Hives and Rash    Hydroxychloroquine Other (See Comments)     Can't remember the reaction      Iodinated contrast media Other (See Comments)     Other reaction(s): BURNING ALL OVER    Iodine Other (See Comments) and Hives     Other reaction(s): BURNING ALL OVER - Iodine dye - Not topical    Ondansetron Nausea And Vomiting    Sulfa (sulfonamide antibiotics) Other (See Comments)     Can't remember the reaction    Zofran [ondansetron hcl (pf)] Nausea And Vomiting     Pt reports that last time she received zofran she started vomiting again    Methotrexate analogues Nausea Only    Pneumococcal vaccine Other (See Comments)    Pneumovax 23 [pneumococcal 23-argenis ps vaccine] Other (See Comments)     "sick"    Methotrexate Nausea Only       Current Outpatient Medications   Medication Sig Dispense Refill    albuterol (PROVENTIL/VENTOLIN HFA) 90 mcg/actuation inhaler Inhale 2 puffs into the lungs every 6 (six) hours as needed. Rescue 20.1 g 11    azelastine (ASTELIN) 137 mcg (0.1 %) nasal spray Use 1 spray (137 mcg total) in each nostril 2 (two) times daily. 30 mL 1    budesonide-formoterol 160-4.5 mcg (SYMBICORT) 160-4.5 mcg/actuation HFAA Inhale 2 puffs into the lungs every 12 (twelve) hours. 30.6 g 3    calcium citrate-vitamin D3 315-200 mg (CITRACAL+D) 315 mg-5 mcg (200 unit) per tablet Take 1 tablet by mouth 2 (two) times daily.       cycloSPORINE (RESTASIS) 0.05 % ophthalmic emulsion PLACE 1 DROP INTO BOTH EYES TWICE A  each 3    diclofenac sodium (VOLTAREN) 1 % Gel APPLY 2 GRAMS TOPICALLY 4 (FOUR) TIMES DAILY AS NEEDED. 300 g 2    fluconazole (DIFLUCAN) 150 MG Tab Take 150 mg by mouth as needed.      fluticasone propionate (FLONASE) 50 mcg/actuation nasal " spray Use 1 spray (50 mcg total) in each nostril once daily. 16 g 1    INV PROPULSID 10 MG Take 2 tablets (20 mg) by mouth 4 (four) times daily. FOR INVESTIGATIONAL USE ONLY. Protocol [id number] Subject ID: [id number]. 1440 each 0    leflunomide (ARAVA) 20 MG Tab Take 1 tablet (20 mg total) by mouth once daily. 90 tablet 0    lifitegrast (XIIDRA) 5 % Dpet INSTILL ONE DROP INTO BOTH EYES TWICE A DAY 60 mL 10    methenamine (HIPREX) 1 gram Tab Take 1 tablet (1 g total) by mouth 2 (two) times daily. 180 tablet 3    methocarbamoL (ROBAXIN) 750 MG Tab Take 1 tablet (750 mg total) by mouth 3 (three) times daily as needed. 90 tablet 1    mirabegron (MYRBETRIQ) 25 mg Tb24 ER tablet Take 1 tablet (25 mg total) by mouth once daily. 90 tablet 3    montelukast (SINGULAIR) 10 mg tablet Take 1 tablet (10 mg total) by mouth every evening. 90 tablet 3    naproxen (NAPROSYN) 500 MG tablet TAKE 1 TABLET (500 MG TOTAL) BY MOUTH 2 (TWO) TIMES DAILY AS NEEDED 180 tablet 1    omeprazole (PRILOSEC) 40 MG capsule Take 1 capsule (40 mg total) by mouth every morning. 30 capsule 6    omeprazole-sodium bicarbonate (ZEGERID) 40-1.1 mg-gram per capsule TAKE 1 CAPSULE BY MOUTH EVERY DAY IN THE MORNING 90 capsule 3    POTASSIUM ORAL Take by mouth once daily.      predniSONE (DELTASONE) 5 MG tablet TAKE 1 TABLET BY MOUTH EVERY DAY 90 tablet 1    pregabalin (LYRICA) 150 MG capsule Take 1 capsule (150 mg total) by mouth 3 (three) times daily. 90 capsule 2    promethazine (PHENERGAN) 25 MG tablet Take 1 tablet (25 mg total) by mouth every 6 (six) hours as needed for Nausea. 15 tablet 0    rosuvastatin (CRESTOR) 20 MG tablet Take 1 tablet by mouth every day in the evening 90 tablet 3    sarilumab (KEVZARA) 200 mg/1.14 mL Syrg Inject 1.14 mL (200 mg total) into the skin every 14 (fourteen) days. 2.28 mL 1    sucralfate (CARAFATE) 1 gram tablet TAKE 1 TABLET (1 G TOTAL) BY MOUTH 4 (FOUR) TIMES DAILY. 360 tablet 2    albuterol-ipratropium (DUO-NEB) 2.5  mg-0.5 mg/3 mL nebulizer solution Take 3 mLs by nebulization every 6 (six) hours as needed for Wheezing. Rescue 90 mL 3    oxyCODONE-acetaminophen (PERCOCET) 5-325 mg per tablet Take 1 tablet by mouth every 6 (six) hours as needed for Pain. 120 tablet 0     No current facility-administered medications for this visit.       Family History   Problem Relation Age of Onset    Cancer Mother         Lung Cancer    Emphysema Mother     Heart attack Mother     Alcohol abuse Mother     Cancer Father         Lung Cancer    Osteoarthritis Father     Lung cancer Father     Skin cancer Father     Alcohol abuse Father     Heart disease Brother     Heart attack Brother     Alcohol abuse Brother     Cirrhosis Brother     Osteoarthritis Paternal Aunt     Retinal detachment Maternal Aunt     No Known Problems Sister     No Known Problems Maternal Uncle     No Known Problems Paternal Uncle     No Known Problems Maternal Grandmother     No Known Problems Maternal Grandfather     No Known Problems Paternal Grandmother     No Known Problems Paternal Grandfather     Colon cancer Neg Hx     Esophageal cancer Neg Hx     Stomach cancer Neg Hx     Celiac disease Neg Hx     Diabetes Neg Hx     Thyroid disease Neg Hx     Amblyopia Neg Hx     Blindness Neg Hx     Cataracts Neg Hx     Glaucoma Neg Hx     Hypertension Neg Hx     Macular degeneration Neg Hx     Strabismus Neg Hx     Stroke Neg Hx        Social History     Socioeconomic History    Marital status:    Tobacco Use    Smoking status: Never    Smokeless tobacco: Never   Substance and Sexual Activity    Alcohol use: Yes     Comment: 2-3 times per year.    Drug use: No    Sexual activity: Yes     Partners: Male     Birth control/protection: Surgical     Social Determinants of Health     Financial Resource Strain: Low Risk  (9/6/2023)    Overall Financial Resource Strain (CARDIA)     Difficulty of Paying Living Expenses: Not hard at all   Food Insecurity: No Food Insecurity  (9/6/2023)    Hunger Vital Sign     Worried About Running Out of Food in the Last Year: Never true     Ran Out of Food in the Last Year: Never true   Transportation Needs: No Transportation Needs (9/6/2023)    PRAPARE - Transportation     Lack of Transportation (Medical): No     Lack of Transportation (Non-Medical): No   Physical Activity: Inactive (9/6/2023)    Exercise Vital Sign     Days of Exercise per Week: 0 days     Minutes of Exercise per Session: 0 min   Stress: No Stress Concern Present (9/6/2023)    [employer] [employer] - Occupational Stress Questionnaire     Feeling of Stress : Not at all   Social Connections: Socially Integrated (9/6/2023)    Social Connection and Isolation Panel [NHANES]     Frequency of Communication with Friends and Family: Twice a week     Frequency of Social Gatherings with Friends and Family: More than three times a week     Attends Alevism Services: More than 4 times per year     Active Member of Clubs or Organizations: Yes     Attends Club or Organization Meetings: More than 4 times per year     Marital Status:    Housing Stability: Low Risk  (9/6/2023)    Housing Stability Vital Sign     Unable to Pay for Housing in the Last Year: No     Number of Places Lived in the Last Year: 1     Unstable Housing in the Last Year: No           ROS        Objective:   /82   Pulse 84   Temp 98.3 °F (36.8 °C)   Resp 18   Wt 62.9 kg (138 lb 10.7 oz)   LMP  (LMP Unknown)   SpO2 98%   BMI 26.20 kg/m²   Pain Disability Index Review:      11/28/2023    11:25 AM 5/15/2023     9:40 AM 3/8/2023     2:49 PM   Last 3 PDI Scores   Pain Disability Index (PDI) 51 41 24     Normocephalic.  Atraumatic.  Affect appropriate.  Breathing unlabored.  Extra ocular muscles intact.           General    Vitals reviewed.    General Musculoskeletal Exam   Gait: antalgic     Right Ankle/Foot Exam     Tests   Heel Walk: able to perform  Tiptoe Walk: able to perform    Left  Ankle/Foot Exam     Tests   Heel Walk: able to perform  Tiptoe Walk: able to perform      Right Hip Exam     Range of Motion   External rotation:  normal   Internal rotation:  normal     Tests   Pain w/ forced internal rotation (SANDRA): absent  Elias: negative    Other   Sensation: normal  Left Hip Exam     Range of Motion   External rotation:  normal   Internal rotation: normal     Tests   Pain w/ forced internal rotation (SANDRA): absent  Elias: negative    Other   Sensation: normal      Back (L-Spine & T-Spine) / Neck (C-Spine) Exam     Back (L-Spine & T-Spine) Range of Motion   Extension:  abnormal Back extension: Limited due to pain.  Flexion:  abnormal     Back (L-Spine & T-Spine) Tests   Right Side Tests  Femoral Stretch: negative  Left Side Tests  Femoral Stretch: negative    Comments:   positive sacroiliac joint pain with finger Destin test, thigh thrust test and compression test.  Negative SANDRA's      Muscle Strength   Right Lower Extremity   Hip Flexion: 5/5   Quadriceps:  5/5   Hamstrin/5   Gastrocsoleus:  5/5   EHL:  5/5  Left Lower Extremity   Hip Flexion: 5/5   Quadriceps:  5/5   Hamstrin/5   Gastrocsoleus:  5/5   EHL:  5/5    Reflexes     Left Side  Babinski Sign:  absent  Ankle Clonus:  absent    Right Side   Babinski Sign:  absent  Ankle Clonus:  absent    Vascular Exam       Capillary Refill  Right Hand: normal capillary refill  Left Hand: normal capillary refill        Edema  Right Upper Leg: absent  Left Upper Leg: absent      Assessment:       Encounter Diagnoses   Name Primary?    Postlaminectomy syndrome of lumbar region Yes    Sacroiliac dysfunction     Trochanteric bursitis, unspecified laterality     S/P spinal surgery          Plan:   We discussed with the patient the assessment and recommendations. The following is the plan we agreed on:  SCS trial [place]  Psych eval  Refill meds      Diagnoses and all orders for this visit:    Postlaminectomy syndrome of lumbar region  -      Ambulatory referral/consult to Psychology; Future  -     Procedure Order to Pain Management; Future    Sacroiliac dysfunction  -     Ambulatory referral/consult to Psychology; Future  -     Procedure Order to Pain Management; Future    Trochanteric bursitis, unspecified laterality  -     Ambulatory referral/consult to Psychology; Future  -     Procedure Order to Pain Management; Future    S/P spinal surgery  -     oxyCODONE-acetaminophen (PERCOCET) 5-325 mg per tablet; Take 1 tablet by mouth every 6 (six) hours as needed for Pain.

## 2023-11-29 ENCOUNTER — TELEPHONE (OUTPATIENT)
Dept: HEMATOLOGY/ONCOLOGY | Facility: CLINIC | Age: 63
End: 2023-11-29
Payer: MEDICARE

## 2023-11-29 ENCOUNTER — PATIENT MESSAGE (OUTPATIENT)
Dept: HEMATOLOGY/ONCOLOGY | Facility: CLINIC | Age: 63
End: 2023-11-29
Payer: MEDICARE

## 2023-11-29 RX ORDER — HEPARIN 100 UNIT/ML
500 SYRINGE INTRAVENOUS
Status: CANCELLED | OUTPATIENT
Start: 2023-12-05

## 2023-11-29 RX ORDER — SODIUM CHLORIDE 0.9 % (FLUSH) 0.9 %
10 SYRINGE (ML) INJECTION
Status: CANCELLED | OUTPATIENT
Start: 2023-12-05

## 2023-11-29 RX ORDER — HEPARIN 100 UNIT/ML
500 SYRINGE INTRAVENOUS
Status: CANCELLED | OUTPATIENT
Start: 2023-12-12

## 2023-11-29 RX ORDER — SODIUM CHLORIDE 0.9 % (FLUSH) 0.9 %
10 SYRINGE (ML) INJECTION
Status: CANCELLED | OUTPATIENT
Start: 2023-12-12

## 2023-11-29 NOTE — TELEPHONE ENCOUNTER
Spoke to pt via phone and updated her on setting up for iron transfusion, pt request [facility], new labs and pushing back MD appts.

## 2023-12-01 ENCOUNTER — TELEPHONE (OUTPATIENT)
Dept: PAIN MEDICINE | Facility: CLINIC | Age: 63
End: 2023-12-01
Payer: MEDICARE

## 2023-12-01 NOTE — TELEPHONE ENCOUNTER
Staff spoke to patient and explained that she has to have her Psych eval before the SCS trial appointment can be scheduled.

## 2023-12-01 NOTE — TELEPHONE ENCOUNTER
----- Message from Katie Sanchez sent at 12/1/2023 11:20 AM CST -----  Regarding: RE: MRN: [id number]  Contact: patient  I'm going to cc: Alva on this message, Tran has an order from 11/28/23 for a trail ,  I didn't notice  if she had a [facility] eval  with Dr. Rabago, pt. Has multiple appts. With pain providers 12/11/23 w/ Valarie, 12/12/23 w/ Dr. Mayfield, 12/26/23 with Dr. Mayfield.    ----- Message -----  From: Kerry Taylor MA  Sent: 12/1/2023  10:43 AM CST  To: Katie Sanchez  Subject: MRN: [id number]                                        ----- Message -----  From: Da Tinajero  Sent: 11/30/2023   4:26 PM CST  To: Tran Spencer Staff    Type:  Patient Call          Who Called: patient         Does the patient know what this is regarding?: Requesting a call back about a appt that need to be scheduled for Evoke implant ; please advise           Would the patient rather a call back or a response via [facility]?call          Best Call Back Number:[phone] ([place])              Additional Information:

## 2023-12-01 NOTE — TELEPHONE ENCOUNTER
----- Message from Darya Feliciano sent at 12/1/2023 11:25 AM CST -----   Name of Who is Calling:     What is the request in detail: patient  request call back in reference  to schedule   Evoke appointment  Please contact to further discuss and advise      Can the clinic reply by [facility]:     [facility]   [phone]

## 2023-12-04 ENCOUNTER — TELEPHONE (OUTPATIENT)
Dept: PAIN MEDICINE | Facility: CLINIC | Age: 63
End: 2023-12-04
Payer: MEDICARE

## 2023-12-04 ENCOUNTER — PATIENT MESSAGE (OUTPATIENT)
Dept: PAIN MEDICINE | Facility: CLINIC | Age: 63
End: 2023-12-04
Payer: MEDICARE

## 2023-12-04 NOTE — TELEPHONE ENCOUNTER
----- Message from Quinn Alvarenga sent at 12/4/2023  3:34 PM CST -----  Name of Who is Calling: TONNY GOMEZ [[id number]]            What is the request in detail: Patient is requesting call back to speak to ondre and to inform she doesn't need a referral              Can the clinic reply by [facility]: no              [facility] [phone]

## 2023-12-04 NOTE — TELEPHONE ENCOUNTER
"----- Message from Sue Oscar sent at 12/4/2023  9:05 AM CST -----  Regarding: Call back  "Type:  Patient Call Back    Who Called:PT    What is the reqeust in detail:Pt requesting call back states she still haven't received an call to get scheduled for her PSY eval. Please advise    Can the clinic reply by [facility]?no    Best Call Back Number:[phone]      Additional Information:            "

## 2023-12-04 NOTE — TELEPHONE ENCOUNTER
Staff spoke to patient and provided her with the order and referral numbers so she can contact her insurer.

## 2023-12-04 NOTE — TELEPHONE ENCOUNTER
----- Message from Loreta Gonzales MA sent at 12/4/2023  3:23 PM CST -----  Name of Who is Calling: Daja with [facility] calling on behalf of   TONNY GOMEZ [[id number]]              What is the request in detail: They wanted to let the office know the pt does not need a referral for PSYCH evaluation. Please assist.                Can the clinic reply by [facility]: No                What [facility] Back [facility] [phone]

## 2023-12-05 ENCOUNTER — OFFICE VISIT (OUTPATIENT)
Dept: HEMATOLOGY/ONCOLOGY | Facility: CLINIC | Age: 63
End: 2023-12-05
Payer: MEDICARE

## 2023-12-05 ENCOUNTER — PATIENT OUTREACH (OUTPATIENT)
Dept: ADMINISTRATIVE | Facility: HOSPITAL | Age: 63
End: 2023-12-05
Payer: MEDICARE

## 2023-12-05 ENCOUNTER — OFFICE VISIT (OUTPATIENT)
Dept: PODIATRY | Facility: CLINIC | Age: 63
End: 2023-12-05
Payer: MEDICARE

## 2023-12-05 VITALS — HEIGHT: 61 IN | WEIGHT: 136.69 LBS | BODY MASS INDEX: 25.81 KG/M2

## 2023-12-05 DIAGNOSIS — L97.511 ULCER OF RIGHT FOOT, LIMITED TO BREAKDOWN OF SKIN: Primary | ICD-10-CM

## 2023-12-05 DIAGNOSIS — M20.41 HAMMER TOE OF RIGHT FOOT: ICD-10-CM

## 2023-12-05 DIAGNOSIS — M05.79 RHEUMATOID ARTHRITIS INVOLVING MULTIPLE SITES WITH POSITIVE RHEUMATOID FACTOR: ICD-10-CM

## 2023-12-05 DIAGNOSIS — D50.0 IRON DEFICIENCY ANEMIA DUE TO CHRONIC BLOOD LOSS: Primary | ICD-10-CM

## 2023-12-05 PROCEDURE — 11042 WOUND DEBRIDEMENT: ICD-10-PCS | Mod: S$GLB,,, | Performed by: PODIATRIST

## 2023-12-05 PROCEDURE — 1159F MED LIST DOCD IN RCRD: CPT | Mod: CPTII,S$GLB,, | Performed by: PODIATRIST

## 2023-12-05 PROCEDURE — 1160F PR REVIEW ALL MEDS BY PRESCRIBER/CLIN PHARMACIST DOCUMENTED: ICD-10-PCS | Mod: CPTII,S$GLB,, | Performed by: PODIATRIST

## 2023-12-05 PROCEDURE — 99214 PR OFFICE/OUTPT VISIT, EST, LEVL IV, 30-39 MIN: ICD-10-PCS | Mod: 95,,, | Performed by: INTERNAL MEDICINE

## 2023-12-05 PROCEDURE — 3008F PR BODY MASS INDEX (BMI) DOCUMENTED: ICD-10-PCS | Mod: CPTII,S$GLB,, | Performed by: PODIATRIST

## 2023-12-05 PROCEDURE — 99213 OFFICE O/P EST LOW 20 MIN: CPT | Mod: 25,S$GLB,, | Performed by: PODIATRIST

## 2023-12-05 PROCEDURE — 99213 PR OFFICE/OUTPT VISIT, EST, LEVL III, 20-29 MIN: ICD-10-PCS | Mod: 25,S$GLB,, | Performed by: PODIATRIST

## 2023-12-05 PROCEDURE — 99214 OFFICE O/P EST MOD 30 MIN: CPT | Mod: 95,,, | Performed by: INTERNAL MEDICINE

## 2023-12-05 PROCEDURE — 99999 PR PBB SHADOW E&M-EST. PATIENT-LVL V: ICD-10-PCS | Mod: PBBFAC,,, | Performed by: PODIATRIST

## 2023-12-05 PROCEDURE — 1159F PR MEDICATION LIST DOCUMENTED IN MEDICAL RECORD: ICD-10-PCS | Mod: CPTII,S$GLB,, | Performed by: PODIATRIST

## 2023-12-05 PROCEDURE — 11042 DBRDMT SUBQ TIS 1ST 20SQCM/<: CPT | Mod: S$GLB,,, | Performed by: PODIATRIST

## 2023-12-05 PROCEDURE — 99999 PR PBB SHADOW E&M-EST. PATIENT-LVL V: CPT | Mod: PBBFAC,,, | Performed by: PODIATRIST

## 2023-12-05 PROCEDURE — 1160F RVW MEDS BY RX/DR IN RCRD: CPT | Mod: CPTII,S$GLB,, | Performed by: PODIATRIST

## 2023-12-05 PROCEDURE — 3008F BODY MASS INDEX DOCD: CPT | Mod: CPTII,S$GLB,, | Performed by: PODIATRIST

## 2023-12-05 NOTE — PROGRESS NOTES
Updates were requested from care everywhere.  Health Maintenance has been updated.  LINKS immunization registry triggered.  Immunizations were reconciled.  Per MIKE in basket message, pt declined flu vaccine 12/05/2023.

## 2023-12-05 NOTE — PROGRESS NOTES
"[facility] PODIATRY  [address] [place] [address], [address]  [facility] [address]  Dept: [phone]  Dept Fax: [phone]    Isiah Mark Jr., DPM     Assessment:   MDM    Coding  1. Ulcer of right foot, limited to breakdown of skin  Wound Debridement      2. Rheumatoid arthritis involving multiple sites with positive rheumatoid factor        3. Hammer toe of right foot            Plan:     Wound Debridement    Date/Time: 12/5/2023 11:44 AM    Performed by: Isiah Mark Jr., DPM  Authorized by: Isiah Mark Jr., DPM    Time out: Immediately prior to procedure a "time out" was called to verify the correct patient, procedure, equipment, support staff and site/side marked as required.    Consent Done?:  Yes (Verbal)  Local anesthesia used?: No      Wound Details:    Location:  Right foot    Location:  Right 3rd Toe    Type of Debridement:  Excisional       Length (cm):  0.3       Area (sq cm):  0.09       Width (cm):  0.3       Percent Debrided (%):  100       Depth (cm):  0.3       Total Area Debrided (sq cm):  0.09    Depth of debridement:  Subcutaneous tissue    Tissue debrided:  Epidermis and Subcutaneous    Devitalized tissue debrided:  Biofilm and Callus    Instruments:  Curette  Bleeding:  Minimal  Hemostasis Achieved: Yes  Method Used:  Silver Nitrate  Patient tolerance:  Patient tolerated the procedure well with no immediate complications    Joyce was seen today for foot ulcer.    Diagnoses and all orders for this visit:    Ulcer of right foot, limited to breakdown of skin  -     Wound Debridement    Rheumatoid arthritis involving multiple sites with positive rheumatoid factor    Hammer toe of right foot        -pt seen, evaluated, and managed  -dx discussed in detail. All questions/concerns addressed  -all tx options discussed. All alternatives, risks, benefits of all txs discussed  -the patient was educated about the diagnosis  -clinically a R 3rd toe pressure ulceration from RA " deformity of toe  -this is an ulceration without s/s of infection, that is stable/improving  -px as above  -collagen based DSD applied. Pt to change QD at home    -rxs dispensed: none  -referrals: none  -WB: wbat    Short-term goals include but are not limited to: maintaining good offloading and minimizing bioburden, promoting granulation and epithelialization to healing.  Wound healing is a medically reasonable expectation based on the clinical circumstances documented.    Long-term goals include but are not limited to: keeping the wound healed by good offloading and medical management under the direction of internist.    Advised pt of risks of current condition including but not limited to: worsening of infection, potential for limb loss    Follow-up: Patient is to return to the clinic in 4 week for follow-up but should call [facility] immediately if any signs of infection, such as fever, chills, sweats, increased redness or pain.    Follow up in 1 week (on 12/12/2023).    Subjective:      Patient ID: Joyce Peterson is a 63 y.o. female.    Chief Complaint:   Chief Complaint   Patient presents with    Foot Ulcer     Ulcer of right foot, limited to breakdown of skin       CC - foot ulcer: Patient presents to the clinic for evaluation and treatment of high risk feet. The patient's chief complaint is foot ulcer, R foot. duration: several wks. denies n/v/f/c.    Of note - she is s/p left triple arthrodesis, midfoot arthrodesis, ARLEN, CBG on 1/11/23 w/ dr. Finnegan. Overall she is very happy with result of L foot reconstruction.      12/5/23:  Hx as above. F/u for ulceration R foot. Denies n/v/f/c    Foot Ulcer    Toe Pain         Last Podiatry Enc: Visit date not found  Last Enc w/ Me: Visit date not found    Outside reports reviewed: historical medical records.  Family hx: as below  Past Medical History:   Diagnosis Date    Acid reflux     Allergy     Anemia     Asthma     Coronary artery disease     CS (cervical  spondylosis) 03/08/2013    Degenerative disc disease     Degenerative joint disease of hindfoot, left 6/28/2022    Dry eyes     Dry mouth     Gastroparesis     History of methotrexate therapy 01/19/2022    Hyperlipidemia     Lateral meniscus derangement 04/06/2016    Lobular carcinoma in situ     Lumbar spondylosis 03/08/2013    Osteoarthritis     Osteoporosis     Pes planovalgus, acquired, left 6/28/2022    Rheumatoid arthritis(714.0)     Rupture of left triceps tendon 10/17/2018    Umbilical hernia 08/13/2015     Past Surgical History:   Procedure Laterality Date    BREAST BIOPSY Left 01/29/2002    core bx    CARPAL TUNNEL RELEASE Right 05/2017    CHOLECYSTECTOMY  2004    COLONOSCOPY      10/11    COLONOSCOPY N/A 6/29/2017    Procedure: COLONOSCOPY;  Surgeon: López Moore MD;  Location: [facility] ENDO ([address] [place] [address]);  Service: Endoscopy;  Laterality: N/A;    EPIDURAL STEROID INJECTION N/A 11/18/2021    Procedure: INJECTION, STEROID, EPIDURAL, L5-S1 IL NEED CONSENT;  Surgeon: Tj Mayfield MD;  Location: [facility] PAIN MGT;  Service: Pain Management;  Laterality: N/A;    EPIDURAL STEROID INJECTION N/A 9/29/2022    Procedure: LUMBAR L3/L4 IL MADELINE CONTRAST;  Surgeon: Tj Mayfield MD;  Location: [facility] PAIN MGT;  Service: Pain Management;  Laterality: N/A;    EPIDURAL STEROID INJECTION N/A 12/12/2022    Procedure: INJECTION, STEROID, EPIDURAL L5/S1 IL CONTRAST;  Surgeon: Tj Mayfield MD;  Location: [facility] PAIN MGT;  Service: Pain Management;  Laterality: N/A;    EPIDURAL STEROID INJECTION N/A 4/6/2023    Procedure: INJECTION, STEROID, EPIDURAL L5/S1;  Surgeon: Tj Mayfield MD;  Location: [facility] PAIN MGT;  Service: Pain Management;  Laterality: N/A;    FOOT ARTHRODESIS Left 1/11/2023    Procedure: FUSION, FOOT;  Surgeon: Ariella Finnegan MD;  Location: [facility] OR;  Service: Orthopedics;  Laterality: Left;  nimbus    FUSION, SPINE, LUMBAR, TLIF, POSTERIOR APPROACH, USING PEDICLE SCREW N/A 6/22/2023    Procedure: FUSION, SPINE, LUMBAR, TLIF,  POSTERIOR APPROACH, USING PEDICLE SCREW L4/5 spinewave SNS:SSEP/EMG;  Surgeon: Jh Mosqueda MD;  [facility];  Service: Neurosurgery;  Laterality: N/A;    HARVESTING OF BONE GRAFT Left 1/11/2023    Procedure: SURGICAL PROCUREMENT, BONE GRAFT;  Surgeon: Ariella Finnegan MD;  Location: [facility] OR;  Service: Orthopedics;  Laterality: Left;    INJECTION OF ANESTHETIC AGENT AROUND NERVE Bilateral 8/23/2021    Procedure: BLOCK, NERVE, MEDIAL BRANCH L3,L4,L5;  Surgeon: Tj Mayfield MD;  Location: [facility] PAIN MGT;  Service: Pain Management;  Laterality: Bilateral;  1 of 2    INJECTION OF ANESTHETIC AGENT AROUND NERVE Bilateral 8/26/2021    Procedure: BLOCK, NERVE, MEDIAL BRANCH L3,L4,L5;  Surgeon: Tj Mayfield MD;  Location: [facility]IN [facility]GT;  Service: Pain Management;  Laterality: Bilateral;  2 of 2    INJECTION OF ANESTHETIC AGENT AROUND NERVE Left 7/7/2022    Procedure: BLOCK, NERVE, LEFT OBTURATOR AND FEMORAL;  Surgeon: Tj Mayfield MD;  Location: [facility]IN [facility]GT;  Service: Pain Management;  Laterality: Left;    INJECTION OF FACET JOINT Bilateral 3/12/2020    Procedure: FACET JOINT INJECTION (LUMBAR BLOCK) BILATERAL L4-5 AND L5-S1 DIRECT REFERRAL;  Surgeon: Tj Mayfield MD;  Location: [facility]IN [facility]GT;  Service: Pain Management;  Laterality: Bilateral;  NEEDS CONSENT    INJECTION OF FACET JOINT Bilateral 3/29/2021    Procedure: INJECTION, FACET JOINT L3/4, L4/5, L5/S1;  Surgeon: Tj Mayfield MD;  Location: [facility]GT;  Service: Pain Management;  Laterality: Bilateral;    INJECTION OF JOINT Right 7/30/2020    Procedure: INJECTION, JOINT, RIGHT HIP Interarticular under flouro;  Surgeon: Tj Mayfield MD;  Location: [facility]GT;  Service: Pain Management;  Laterality: Right;  INJECTION, JOINT, RIGHT HIP Interarticular under flouro    INJECTION OF JOINT Right 2/21/2022    Procedure: Injection, Joint RIGHT ISCHIAL BURSA;  Surgeon: jT Mayfield MD;  Location: [facility]GT;  Service: Pain Management;  Laterality:  Right;    INTRAMEDULLARY RODDING OF FEMUR Left 5/21/2019    Procedure: INSERTION, INTRAMEDULLARY ARIEL, FEMUR;  Surgeon: López Duff MD;  Location: [facility] OR 2ND FLR;  Service: Orthopedics;  Laterality: Left;    LENGTHENING OF TENDON OF LOWER EXTREMITY Left 1/11/2023    Procedure: LENGTHENING, TENDON, LOWER EXTREMITY;  Surgeon: Ariella Finnegan MD;  Location: [facility] OR;  Service: Orthopedics;  Laterality: Left;    MAGNETIC RESONANCE IMAGING N/A 5/29/2023    Procedure: MRI (Magnetic Resonance Imagine);  Surgeon: Sujey Surgeon;  Location: [facility] SUJEY;  Service: Anesthesiology;  Laterality: N/A;    RADIOFREQUENCY ABLATION Left 9/20/2021    Procedure: RADIOFREQUENCY ABLATION left L3,4,5 RFA  1 of 2  CONSENT NEEDED;  Surgeon: Tj Mayfield MD;  Location: [facility] PAIN MGT;  Service: Pain Management;  Laterality: Left;    RADIOFREQUENCY ABLATION Right 10/4/2021    Procedure: RADIOFREQUENCY ABLATION  right L3,4,5 RFA   2 of 2  CONSENT NEEDED;  Surgeon: Tj Mayfield MD;  Location: [facility];  Service: Pain Management;  Laterality: Right;    RADIOFREQUENCY ABLATION Left 4/21/2022    Procedure: Radiofrequency Ablation LEFT L3,L4,L5;  Surgeon: Tj Mayfield MD;  Location: [facility]GT;  Service: Pain Management;  Laterality: Left;    RADIOFREQUENCY ABLATION Right 5/12/2022    Procedure: Radiofrequency Ablation RIGHT L3,L4,L5;  Surgeon: Tj Mayfield MD;  Location: [facility] PAIN MGT;  Service: Pain Management;  Laterality: Right;    RADIOFREQUENCY ABLATION Left 7/25/2022    Procedure: Radiofrequency Ablation Left Femoral & Obturator;  Surgeon: Tj Mayfield MD;  Location: [facility] PAIN MGT;  Service: Pain Management;  Laterality: Left;    REPAIR OF TRICEPS TENDON Left 10/17/2018    Procedure: REPAIR, TENDON, TRICEPS left elbow;  Surgeon: Staci Yarbrough MD;  Location: [facility] OR 1ST FLR;  Service: Orthopedics;  Laterality: Left;  Anesthesia: General and regional. PRONE, k-wire , hand pan 1 and pan 2, CALL ARTHREX/Tamera notified  10-12 LO    TONSILLECTOMY      TRANSFORAMINAL EPIDURAL INJECTION OF STEROID Right 8/31/2020    Procedure: INJECTION, STEROID, EPIDURAL, TRANSFORAMINAL,  APPROACH, L3-L4 and L4-L5 need consent;  Surgeon: Tj Mayfield MD;  Location: [facility] PAIN MGT;  Service: Pain Management;  Laterality: Right;    TRANSFORAMINAL EPIDURAL INJECTION OF STEROID Bilateral 7/23/2021    Procedure: INJECTION, STEROID, EPIDURAL, TRANSFORAMINAL APPROACH L4/5;  Surgeon: Tj Mayfield MD;  Location: [facility] PAIN MGT;  Service: Pain Management;  Laterality: Bilateral;    TRANSFORAMINAL EPIDURAL INJECTION OF STEROID Bilateral 9/25/2023    Procedure: LUMBAR TRANSFORAMINAL BILATERAL  L2/3 DIRECT REFERRAL;  Surgeon: Tj Mayfield MD;  Location: [facility] PAIN MGT;  Service: Pain Management;  Laterality: Bilateral;    TRANSFORAMINAL EPIDURAL INJECTION OF STEROID Left 10/18/2023    Procedure: LUMBAR TRANSFORAMINAL LEFT L3/4 AND L4/5 * CLEARANCE IN CHART*;  Surgeon: Tj Mayfield MD;  Location: [facility] PAIN MGT;  Service: Pain Management;  Laterality: Left;    TRIGGER POINT INJECTION N/A 7/23/2021    Procedure: INJECTION, TRIGGER POINT SCAPULAR;  Surgeon: Tj Mayfield MD;  Location: [facility] PAIN MGT;  Service: Pain Management;  Laterality: N/A;    TUBAL LIGATION  2003    UPPER GASTROINTESTINAL ENDOSCOPY      10/11    uterine ablation  2003     Family History   Problem Relation Age of Onset    Cancer Mother         Lung Cancer    Emphysema Mother     Heart attack Mother     Alcohol abuse Mother     Cancer Father         Lung Cancer    Osteoarthritis Father     Lung cancer Father     Skin cancer Father     Alcohol abuse Father     Heart disease Brother     Heart attack Brother     Alcohol abuse Brother     Cirrhosis Brother     Osteoarthritis Paternal Aunt     Retinal detachment Maternal Aunt     No Known Problems Sister     No Known Problems Maternal Uncle     No Known Problems Paternal Uncle     No Known Problems Maternal Grandmother     No Known Problems  Maternal Grandfather     No Known Problems Paternal Grandmother     No Known Problems Paternal Grandfather     Colon cancer Neg Hx     Esophageal cancer Neg Hx     Stomach cancer Neg Hx     Celiac disease Neg Hx     Diabetes Neg Hx     Thyroid disease Neg Hx     Amblyopia Neg Hx     Blindness Neg Hx     Cataracts Neg Hx     Glaucoma Neg Hx     Hypertension Neg Hx     Macular degeneration Neg Hx     Strabismus Neg Hx     Stroke Neg Hx      Current Outpatient Medications   Medication Sig Dispense Refill    albuterol (PROVENTIL/VENTOLIN HFA) 90 mcg/actuation inhaler Inhale 2 puffs into the lungs every 6 (six) hours as needed. Rescue 20.1 g 11    azelastine (ASTELIN) 137 mcg (0.1 %) nasal spray Use 1 spray (137 mcg total) in each nostril 2 (two) times daily. 30 mL 1    budesonide-formoterol 160-4.5 mcg (SYMBICORT) 160-4.5 mcg/actuation HFAA Inhale 2 puffs into the lungs every 12 (twelve) hours. 30.6 g 3    calcium citrate-vitamin D3 315-200 mg (CITRACAL+D) 315 mg-5 mcg (200 unit) per tablet Take 1 tablet by mouth 2 (two) times daily.       cycloSPORINE (RESTASIS) 0.05 % ophthalmic emulsion PLACE 1 DROP INTO BOTH EYES TWICE A  each 3    diclofenac sodium (VOLTAREN) 1 % Gel APPLY 2 GRAMS TOPICALLY 4 (FOUR) TIMES DAILY AS NEEDED. 300 g 2    fluconazole (DIFLUCAN) 150 MG Tab Take 150 mg by mouth as needed.      fluticasone propionate (FLONASE) 50 mcg/actuation nasal spray Use 1 spray (50 mcg total) in each nostril once daily. 16 g 1    INV PROPULSID 10 MG Take 2 tablets (20 mg) by mouth 4 (four) times daily. FOR INVESTIGATIONAL USE ONLY. Protocol [id number] Subject ID: [id number]. 1440 each 0    leflunomide (ARAVA) 20 MG Tab Take 1 tablet (20 mg total) by mouth once daily. 90 tablet 0    lifitegrast (XIIDRA) 5 % Dpet INSTILL ONE DROP INTO BOTH EYES TWICE A DAY 60 mL 10    methenamine (HIPREX) 1 gram Tab Take 1 tablet (1 g total) by mouth 2 (two) times daily. 180 tablet 3    methocarbamoL (ROBAXIN) 750 MG Tab Take 1  tablet (750 mg total) by mouth 3 (three) times daily as needed. 90 tablet 1    mirabegron (MYRBETRIQ) 25 mg Tb24 ER tablet Take 1 tablet (25 mg total) by mouth once daily. 90 tablet 3    montelukast (SINGULAIR) 10 mg tablet Take 1 tablet (10 mg total) by mouth every evening. 90 tablet 3    naproxen (NAPROSYN) 500 MG tablet TAKE 1 TABLET (500 MG TOTAL) BY MOUTH 2 (TWO) TIMES DAILY AS NEEDED 180 tablet 1    omeprazole (PRILOSEC) 40 MG capsule Take 1 capsule (40 mg total) by mouth every morning. 30 capsule 6    omeprazole-sodium bicarbonate (ZEGERID) 40-1.1 mg-gram per capsule TAKE 1 CAPSULE BY MOUTH EVERY DAY IN THE MORNING 90 capsule 3    oxyCODONE-acetaminophen (PERCOCET) 5-325 mg per tablet Take 1 tablet by mouth every 6 (six) hours as needed for Pain. 120 tablet 0    POTASSIUM ORAL Take by mouth once daily.      predniSONE (DELTASONE) 5 MG tablet TAKE 1 TABLET BY MOUTH EVERY DAY 90 tablet 1    pregabalin (LYRICA) 150 MG capsule Take 1 capsule (150 mg total) by mouth 3 (three) times daily. 90 capsule 2    promethazine (PHENERGAN) 25 MG tablet Take 1 tablet (25 mg total) by mouth every 6 (six) hours as needed for Nausea. 15 tablet 0    rosuvastatin (CRESTOR) 20 MG tablet Take 1 tablet by mouth every day in the evening 90 tablet 3    sarilumab (KEVZARA) 200 mg/1.14 mL Syrg Inject 1.14 mL (200 mg total) into the skin every 14 (fourteen) days. 2.28 mL 1    sucralfate (CARAFATE) 1 gram tablet TAKE 1 TABLET (1 G TOTAL) BY MOUTH 4 (FOUR) TIMES DAILY. 360 tablet 2    albuterol-ipratropium (DUO-NEB) 2.5 mg-0.5 mg/3 mL nebulizer solution Take 3 mLs by nebulization every 6 (six) hours as needed for Wheezing. Rescue 90 mL 3     No current facility-administered medications for this visit.     Review of patient's allergies indicates:   Allergen Reactions    Actemra [tocilizumab] Other (See Comments)     Severe dizziness    Codeine Nausea And Vomiting and Hives    Gold au 198 Hives and Rash    Hydroxychloroquine Other (See  "Comments)     Can't remember the reaction      Iodinated contrast media Other (See Comments)     Other reaction(s): BURNING ALL OVER    Iodine Other (See Comments) and Hives     Other reaction(s): BURNING ALL OVER - Iodine dye - Not topical    Ondansetron Nausea And Vomiting    Sulfa (sulfonamide antibiotics) Other (See Comments)     Can't remember the reaction    Zofran [ondansetron hcl (pf)] Nausea And Vomiting     Pt reports that last time she received zofran she started vomiting again    Methotrexate analogues Nausea Only    Pneumococcal vaccine Other (See Comments)    Pneumovax 23 [pneumococcal 23-argenis ps vaccine] Other (See Comments)     "sick"    Methotrexate Nausea Only     Social History     Socioeconomic History    Marital status:    Tobacco Use    Smoking status: Never    Smokeless tobacco: Never   Substance and Sexual Activity    Alcohol use: Yes     Comment: 2-3 times per year.    Drug use: No    Sexual activity: Yes     Partners: Male     Birth control/protection: Surgical     Social Determinants of Health     Financial Resource Strain: Unknown (12/5/2023)    Overall Financial Resource Strain (CARDIA)     Difficulty of Paying Living Expenses: Patient refused   Food Insecurity: Unknown (12/5/2023)    Hunger Vital Sign     Worried About Running Out of Food in the Last Year: Patient refused     Ran Out of Food in the Last Year: Patient refused   Transportation Needs: Unknown (12/5/2023)    PRAPARE - Transportation     Lack of Transportation (Medical): Patient refused     Lack of Transportation (Non-Medical): Patient refused   Physical Activity: Unknown (12/5/2023)    Exercise Vital Sign     Days of Exercise per Week: Patient refused     Minutes of Exercise per Session: 0 min   Recent Concern: Physical Activity - Inactive (9/6/2023)    Exercise Vital Sign     Days of Exercise per Week: 0 days     Minutes of Exercise per Session: 0 min   Stress: Unknown (12/5/2023)    [facility] " Health - Occupational Stress Questionnaire     Feeling of Stress : Patient refused   Social Connections: Unknown (12/5/2023)    Social Connection and Isolation Panel [NHANES]     Frequency of Communication with Friends and Family: Patient refused     Frequency of Social Gatherings with Friends and Family: Patient refused     Attends Mu-ism Services: More than 4 times per year     Active Member of Clubs or Organizations: Patient refused     Attends Club or Organization Meetings: Patient refused     Marital Status: Patient refused   Housing Stability: Unknown (12/5/2023)    Housing Stability Vital Sign     Unable to Pay for Housing in the Last Year: Patient refused     Number of Places Lived in the Last Year: 1     Unstable Housing in the Last Year: No       ROS    REVIEW OF SYSTEMS: Negative as documented below as well as positive findings in bold.       Constitutional  Respiratory  Gastrointestinal  Skin   - Fever - Cough - Heartburn - Rash   - Chills - Spit blood - Nausea - Itching   - Weight Loss - Shortness of breath - Vomiting - Nail pain   - Malaise/Fatigue - Wheezing - Abdominal Pain  Wound/Ulcer   - Weight Gain   - Blood in Stool  Poor wound healing       - Diarrhea          Cardiovascular  Genitourinary  Neurological  HEENT   - Chest Pain - Dysuria - Burning Sensation of feet - Headache   - Palpitations - Hematuria - Tingling / Paresthesia - Congestion   - Pain at night in legs - Flank Pain - Dizziness - Sore Throat   - Cramping   - Tremor - Blurred Vision   - Leg Swelling   - Sensory Change - Double Vision   - Dizzy when standing   - Speech Change - Eye Redness       - Focal Weakness - Dry Eyes       - Loss of Consciousness          Endocrine  Musculoskeletal  Psychiatric   - Cold intolerance - Muscle Pain - Depression   - Heat intolerance - Neck Pain - Insomnia   - Anemia - Joint Pain - Memory Loss   -  Easy bruising, bleeding - Heel pain - Anxiety      Toe Pain        Leg/Ankle/Foot Pain    "      Objective:     Ht 5' 1" (1.549 m)   Wt 62 kg (136 lb 11 oz)   LMP  (LMP Unknown)   BMI 25.83 kg/m²   Vitals:    12/05/23 1111   Weight: 62 kg (136 lb 11 oz)   Height: 5' 1" (1.549 m)   PainSc: 0-No pain         Physical Exam    General Appearance:   Patient appears well developed, well nourished  Patient appears stated age    Psychiatric:   Patient is oriented to time, place, and person.  Patient has appropriate mood and affect    Neck:  Trachea Midline  No visible masses    Respiratory/Ears:  No distress or labored breathing.  Able to differentiate between normal talking voice and whisper.  Able to follow commands    Eyes:  Visual Acuity intact  Lids and conjunctivae normal. No discoloration noted.    Physical Exam  Ortho Exam  Ortho/SPM Exam  Foot Exam  Physical Exam  Neurologic Exam    R LE exam con't:  V:  DP 1/4, PT 2/4   CRT< 3s to all digits tested   Tibial and popliteal lymph nodes are w/o abnormality    No edema, + VV    N:  Patient displays normal ankle reflexes   SILT in SP/DP/T/Opal/Saph distributions    Ortho: +Motor EHL/FHL/TA/GA   +TTP R 3rd toe   Compartments soft/compressible. No pain on passive stretch of big toe. No calf  Pain.   hammertoe deformity present 3rd toe    Derm:  skin not intact, ulceration present,  ulcer probes to subQ    Ulcer Location: 3rd toe medial  Measurements (post-debridement): 0.3x0.3x0.5cm  Periwound: hyperkeratotic  Drainage: none  Malodor: none  Base:  fibrotic  Signs of infection: none    Imaging / Labs:    Hemoglobin A1C   Date Value Ref Range Status   03/15/2022 5.2 4.0 - 5.6 % Final     Comment:     ADA Screening Guidelines:  5.7-6.4%  Consistent with prediabetes  >or=6.5%  Consistent with diabetes    High levels of fetal hemoglobin interfere with the HbA1C  assay. Heterozygous hemoglobin variants (HbS, HgC, etc)do  not significantly interfere with this assay.   However, presence of multiple variants may affect accuracy.     02/06/2017 6.0 4.5 - 6.2 % Final    "  Comment:     According to ADA guidelines, hemoglobin A1C <7.0% represents  optimal control in non-pregnant diabetic patients.  Different  metrics may apply to specific populations.   Standards of Medical Care in Diabetes - 2016.  For the purpose of screening for the presence of diabetes:  <5.7%     Consistent with the absence of diabetes  5.7-6.4%  Consistent with increasing risk for diabetes   (prediabetes)  >or=6.5%  Consistent with diabetes  Currently no consensus exists for use of hemoglobin A1C  for diagnosis of diabetes for children.     09/28/2015 5.6 4.5 - 6.2 % Final           Note: This was dictated using a computer transcription program. Although proofread, it may contain computer transcription errors and phonetic errors. Other human proofreading errors may also exist. Corrections may be performed at a later time. Please contact us for any clarification if needed.    Isiah Mark DPM  [facility]

## 2023-12-05 NOTE — PROGRESS NOTES
The patient location is: home  The chief complaint leading to consultation is: ROBERT    Visit type: audio only    Face to Face time with patient: 10  15  minutes of total time spent on the encounter, which includes face to face time and non-face to face time preparing to see the patient (eg, review of tests), Obtaining and/or reviewing separately obtained history, Documenting clinical information in the electronic or other health record, Independently interpreting results (not separately reported) and communicating results to the patient/family/caregiver, or Care coordination (not separately reported).         Each patient to whom he or she provides medical services by telemedicine is:  (1) informed of the relationship between the physician and patient and the respective role of any other health care provider with respect to management of the patient; and (2) notified that he or she may decline to receive medical services by telemedicine and may withdraw from such care at any time.    Notes:         SECTION OF HEMATOLOGY AND BONE MARROW TRANSPLANT  Return  Patient Visit   12/05/2023  Referred by:  No ref. provider found  Referred for: ROBERT    CHIEF COMPLAINT:   No chief complaint on file.      HISTORY OF PRESENT ILLNESS:   63 y.o. female Referred to me November 2017  for ROBERT.  Intolerant to po iron.  Iron studies in march 2017 with severe ID.  History RA followed by Dr. Valverde.  History of gastroparesis followed by Dr. Moore in GI.  Denies fever, chills, nightsweats, bleeding, brusing, lymphadenopathy, signs/symptoms of splenomegaly.   Had upper and lower endoscopy summer 2017 unremarkable.  No VCE done.  Denies any overt GI bleeding or other bleeding.    S/p repeat IV feraheme x 2 January 2018,  may 2019 and oct/nov 2019.  Tolerated well.   Maintained ferritin and hgb since that time. Continues close fu with rheum for RA and osteoperosis.      Interval history: Ms. Peterson presentsfor surveillance fu for ROBERT.     Some fatigue. No bleeding of any kind.     physical exam deferred due to telemed   .    Lab Results   Component Value Date    WBC 5.28 11/28/2023    HGB 11.5 (L) 11/28/2023    HCT 35.2 (L) 11/28/2023    MCV 89 11/28/2023     11/28/2023       Lab Results   Component Value Date    IRON 47 11/28/2023    TIBC 404 11/28/2023    FERRITIN 20 11/28/2023       ROBERT  Microcytic anemia likely ROBERT  due to malabsorption related to GI issues; possible anemia of chronic inflammation from RA and meds contributing  Denies any over GI bleeding; Recent summer 2017 upper and lower GI endoscopy negative; VCE deferred but may need if ROBERT persists  Intolerant to po iron   Requires intermittent iron infusions; again ID with mild anemia on 11/28/23 labs  Plan to Repeat feraheme x 2 dec 2023 and fu with labs 2-3 months later  Infusions [facility] infusion   [facility]     Follow-up:    -Feraheme 12/8, 12/20 [facility]   -Cbc, iron, tibc, ferritin lab 2-3 months in [facility]   -Kylah CANCINO appt 1-2 days after labs

## 2023-12-05 NOTE — Clinical Note
Follow-up:   -Feraheme 12/8, 12/20 [facility]  -Cbc, iron, tibc, ferritin lab 2-3 months in [place]  -Virtual MD appt 1-2 days after labs

## 2023-12-07 DIAGNOSIS — M06.9 RHEUMATOID ARTHRITIS, INVOLVING UNSPECIFIED SITE, UNSPECIFIED WHETHER RHEUMATOID FACTOR PRESENT: ICD-10-CM

## 2023-12-08 RX ORDER — SARILUMAB 200 MG/1.14ML
200 INJECTION, SOLUTION SUBCUTANEOUS
Qty: 2.28 ML | Refills: 1 | Status: ACTIVE | OUTPATIENT
Start: 2023-12-08 | End: 2024-03-04 | Stop reason: SDUPTHER

## 2023-12-11 ENCOUNTER — TELEPHONE (OUTPATIENT)
Dept: PAIN MEDICINE | Facility: CLINIC | Age: 63
End: 2023-12-11
Payer: MEDICARE

## 2023-12-11 ENCOUNTER — OFFICE VISIT (OUTPATIENT)
Dept: PAIN MEDICINE | Facility: CLINIC | Age: 63
End: 2023-12-11
Payer: MEDICARE

## 2023-12-11 ENCOUNTER — DOCUMENTATION ONLY (OUTPATIENT)
Dept: PSYCHIATRY | Facility: CLINIC | Age: 63
End: 2023-12-11
Payer: MEDICARE

## 2023-12-11 ENCOUNTER — OFFICE VISIT (OUTPATIENT)
Dept: PSYCHIATRY | Facility: CLINIC | Age: 63
End: 2023-12-11
Payer: MEDICARE

## 2023-12-11 DIAGNOSIS — M54.17 LUMBOSACRAL RADICULOPATHY: ICD-10-CM

## 2023-12-11 DIAGNOSIS — M48.061 SPINAL STENOSIS OF LUMBAR REGION, UNSPECIFIED WHETHER NEUROGENIC CLAUDICATION PRESENT: ICD-10-CM

## 2023-12-11 DIAGNOSIS — G89.4 CHRONIC PAIN SYNDROME: ICD-10-CM

## 2023-12-11 DIAGNOSIS — M70.60 TROCHANTERIC BURSITIS, UNSPECIFIED LATERALITY: ICD-10-CM

## 2023-12-11 DIAGNOSIS — Z01.818 PREOPERATIVE EVALUATION TO RULE OUT SURGICAL CONTRAINDICATION: Primary | ICD-10-CM

## 2023-12-11 DIAGNOSIS — M96.1 POSTLAMINECTOMY SYNDROME OF LUMBAR REGION: ICD-10-CM

## 2023-12-11 DIAGNOSIS — M96.1 POSTLAMINECTOMY SYNDROME OF LUMBAR REGION: Primary | ICD-10-CM

## 2023-12-11 DIAGNOSIS — M53.3 SACROILIAC DYSFUNCTION: ICD-10-CM

## 2023-12-11 DIAGNOSIS — M79.18 MYOFASCIAL PAIN: ICD-10-CM

## 2023-12-11 PROCEDURE — 96146 PSYCL/NRPSYC TST AUTO RESULT: CPT | Mod: 95,59,, | Performed by: PSYCHOLOGIST

## 2023-12-11 PROCEDURE — 1160F RVW MEDS BY RX/DR IN RCRD: CPT | Mod: CPTII,95,, | Performed by: NURSE PRACTITIONER

## 2023-12-11 PROCEDURE — 96146 PR PSYCH/NEUROPSYCH TEST ADMIN, ELEC PLATFORM, AUTO RESULT ONLY: ICD-10-PCS | Mod: 95,59,, | Performed by: PSYCHOLOGIST

## 2023-12-11 PROCEDURE — 1159F PR MEDICATION LIST DOCUMENTED IN MEDICAL RECORD: ICD-10-PCS | Mod: CPTII,95,, | Performed by: NURSE PRACTITIONER

## 2023-12-11 PROCEDURE — 99213 PR OFFICE/OUTPT VISIT, EST, LEVL III, 20-29 MIN: ICD-10-PCS | Mod: 95,,, | Performed by: NURSE PRACTITIONER

## 2023-12-11 PROCEDURE — 96130 PR PSYCHOLOGIC TEST EVAL SVCS, 1ST HR: ICD-10-PCS | Mod: 95,,, | Performed by: PSYCHOLOGIST

## 2023-12-11 PROCEDURE — 90791 PR PSYCHIATRIC DIAGNOSTIC EVALUATION: ICD-10-PCS | Mod: 95,,, | Performed by: PSYCHOLOGIST

## 2023-12-11 PROCEDURE — 90791 PSYCH DIAGNOSTIC EVALUATION: CPT | Mod: 95,,, | Performed by: PSYCHOLOGIST

## 2023-12-11 PROCEDURE — 1159F MED LIST DOCD IN RCRD: CPT | Mod: CPTII,95,, | Performed by: NURSE PRACTITIONER

## 2023-12-11 PROCEDURE — 99213 OFFICE O/P EST LOW 20 MIN: CPT | Mod: 95,,, | Performed by: NURSE PRACTITIONER

## 2023-12-11 PROCEDURE — 1160F PR REVIEW ALL MEDS BY PRESCRIBER/CLIN PHARMACIST DOCUMENTED: ICD-10-PCS | Mod: CPTII,95,, | Performed by: NURSE PRACTITIONER

## 2023-12-11 PROCEDURE — 96130 PSYCL TST EVAL PHYS/QHP 1ST: CPT | Mod: 95,,, | Performed by: PSYCHOLOGIST

## 2023-12-11 NOTE — TELEPHONE ENCOUNTER
Staff contacted pt regarding rescheduling appt scheduled on 12/12/23 with MD [place] due to provider being out on that day. Pt appt was rescheduled to 1/16/23 for 9:45 am.

## 2023-12-11 NOTE — PROGRESS NOTES
[address]    PRESURGICAL PSYCHOLOGICAL EVALUATION - PAIN MANAGEMENT  Psychiatry Initial Visit (PhD)  Psychological Intake and Assessment    Site: [facility], Department of Psychiatry, Psychology Section  [address] [address]    CPT Codes:   74038 (1 hour): Psychiatric Diagnostic Evaluation  01080 (1 hour): Integration of patient data, interpretation of standardized test results and clinical data, clinical decision-making, treatment planning and report, and interactive feedback to the patient  82047 (1 hour), Psychological or neuropsychological test administration/scoring by physician or other qualified healthcare professional, two or more tests      NAME: Joyce Peterson  MRN: [id number]  : 1960     Date: 2023  Evaluation Length (direct face-to-face time): 40 minutes  Total Time including report writing, test scoring, chart review, integration of data and feedback: 1 hour, 15 minutes    Clinical status of patient: Outpatient  Met with: Patient  Referred by: Tj Mayfield M.D.    Chief complaint/reason for encounter: Psychological Evaluation prior to surgical implantation of a spinal cord stimulator (SCS) to address chronic pain.     Before this evaluation was initiated, the purposes and process of the assessment and the limits of confidentiality were discussed with the patient who expressed understanding of these issues and verbally consented to proceed with the evaluation.      HISTORY OF PRESENT ILLNESS:  Ms. Joyce Peterson is a 63-year-old female referred for Psychological Evaluation prior to surgical implantation of a spinal cord stimulator (SCS) to address chronic pain. She has chronic pain in her hips, back, and leg (mostly the left leg). Her pain has been present since early . She has tried physical therapy, medications, injections, radiofrequency ablation, and surgery (2023) without receiving sufficient relief. Her activities  are greatly limited. She reports, I had foot surgery in January 2023, which we think kicked off the back problems - because it changed my stance It has affected everything. I can't drive right now because I'm on the pain pills. So I miss out on doing things. I can no longer cook or clean my house. I can't carry my grandson. I can't watch my grandsons anymore.    Ms. Peterson is now interested in a trial of SCS. She has reviewed the educational materials and is familiar with the procedures involved (There are two canals that run on each side of the spinal column that are basically empty. They can run the probes on each side in the epidural canal. They attach to a machine which is similar to a pacemaker. During the trial it will be taped to the outside of my body, but if it works they will implant it into my upper buttocks. She understood risks of surgery including bleeding, infection, failure of surgery, misplaced hardware, migration of hardware, need for reoperation, etc. When asked about potential concerns regarding SCS, she indicated no significant concerns. Her expectations regarding SCS include getting off Percocet so I can drive and be more independent. She is motivated to spend time with her friend groups, take care of her grandchildren, and do more social activities outside of her home. If SCS is not effective for her she noted she would not consider suicide as an option and would look forward to future treatment options. Her  will be available to help her during recovery after surgery.    When asked how pain affects her mood, Ms. Peterson reported, Not usually. In fact, my  tells me I'm the only person I know who can be so upbeat while dealing with what I have to deal with. Regarding mental health history, she denied past psychiatric treatment and history. She does not report current psychiatric problems or major psychosocial stressors.       MEDICAL HISTORY:  Relevant Medical  History: Postlaminectomy syndrome of lumbar region; Sacroiliac dysfunction; Trochanteric bursitis, unspecified laterality; S/P spinal surgery    Pain Scales:   Current level of pain: 6/10  Worst pain rating: 10/10  Best pain ratin/10    Current Health Behaviors:  Compliant with medical regimens and appointments: Yes  Prescription medication misuse: No  Exercise: No - However, she does household chores every day   Adequate sleep: Yes  Adequate cognitive functioning: Yes    Current and Past Substance Use/Abuse:  Alcohol: Denied current use; denied history of abuse or dependency.   Drugs: Denied current use; denied history of abuse or dependency.  Tobacco: None.   Caffeine: She drinks ½ of a 2 liter of Diet Coke each day.    Trauma History: Denied.    Psychosocial History and Current Social Situation:    Ms. Peterson was born in [place], [place] and raised in [place], [place] by her biological mother and father along with two older brothers. She described her childhood as very good - we were just the typical American family. She denied childhood trauma, abuse, and neglect. She did good in school and earned a high school diploma. She attended college for 3.5 years and got . She is currently not working. She denied  service. She is not on disability and finances are very stable. She has been  to her  for 41 years. She has two adult sons. She currently lives with her . Jewish is important to her as a source of support.  Legal history: She denied history of arrests and convictions. She was recently involved in litigation with her home insurance to get her final payment (last week they agreed to pay everything they owed you).  Access to guns: Yes, in a safe place.    PAST AND CURRENT MENTAL HEALTH:  Past Mental Health History:  Previous diagnosis: Denied.  Inpatient treatment: Denied.  Prior substance abuse treatment: Denied.  Outpatient treatment: Denied.  Suicide attempt:  Denied.  Non-suicidal self-injury: Denied.    Family Mental Health History:  Psychiatric illness: Denied.  Substance abuse: Denied.  Suicide: Denied.    Current Mental Health Treatment:  Medications: Denied.  Psychotherapy: Denied.    Current Mental Health Symptoms:  Depression: Denied. She denied episodes of depressed mood or depression-related anhedonia.   Saray/Hypomania: Denied. She denied periods of elevated mood or abnormally increased energy or goal-directed activity.  Anxiety:   Generalized Anxiety: Denied. She denied experiencing excessive, exaggerated anxiety that was unmanageable.   Panic Disorder: Denied.  OCD: Denied.  Insomnia: Denied.  Thought dysfunction: Denied delusions, hallucinations.  Non-suicidal or Suicidal thoughts/behaviors: Denied.  Personality functioning: The patient does not display any personality characteristics which would be an impediment to pursuing SCS.    Current Stress Management:  Current psychosocial stressors: Grandson's Autism diagnosis  Report of coping skills: Take a hot shower, Talk with friends, Talk to   Recreational activities: Spend time with friends and family, Go out to eat, Visit camp  Support system: , Family, Friends  Strengths and liabilities: Strength: Patient accepts guidance/feedback, Strength: Patient is expressive/articulate., Strength: Patient is intelligent., Strength: Patient is motivated for change., Strength: Patient has positive support network., Strength: Patient has reasonable judgment., Strength: Patient is stable.       PSYCHOLOGICAL ASSESSMENT/TESTING:  All tests were administered according to standardized procedures and were selected based on the reason for referral. Effort on all tests was satisfactory to produce valid results.    MMPI-3. The MMPI-3 provides an assessment of personality and psychopathology with specific evaluation of psychosocial risk factors associated with outcomes of SCS. Ms. Peterson produced an interpretable  MMPI-3 profile. Of note, validity scale results suggest Ms. Petreson tended to portray herself in an overly positive and well-adjusted presentation, which may indicate an underestimation of problems assessed by this test. This profile should be interpreted with these issues in mind (specifically potential underreporting), in which these results do not rule out symptoms or the possibility that certain treatment approaches could prove helpful for optimizing the candidate's outcomes.   TEST RESULTS. Ms. Peterson reports no significant psychiatric distress or other problems. Although there are no indications of emotional problems, disordered thinking, behavioral issues, somatic complaints, or interpersonal problems, these problems cannot be ruled-out due to indications of under-reporting described earlier.  GROUP COMPARISONS. Compared to other SCS candidates, Ms. Peterson's responses were within or below the typical ranges.   RISK ASSESSMENTS. The following likelihoods are based on test results and research, and are correlational rather than causational. Her profile was associated with no significant risks for adverse postsurgical outcomes.    PAIRS and PCS. The PAIRS and PCS reveal beliefs and attitudes related to pain that may impact outcomes of SCS. Elevated scores indicate the need to query the ability to cope with the pain experience, high levels of emotional distress when pain occurs, and a negative mental set and persistent attention brought to bear during the experience of pain.   The PAIRS indicated significant perceptions of impairment with a total score of 79 (a score over 75 is clinically significant). These results suggest a tendency toward negative beliefs and attitudes about the ability to function and enjoy life despite discomfort from pain, which is more likely to contribute to greater functional impairments.  The PCS indicated significant catastrophizing of pain with a total score of 35 (a score  over 30 is in the 75th percentile and is clinically significant). These results suggest a tendency toward elevated pain perception and a tendency to focus on one's pain, increased perception of the seriousness of pain, and pessimism toward one's ability to cope with the pain experience. Catastrophic thinking is a risk factor for chronicity.     FEEDBACK. Ms. Peterson was provided with test results, and offered the opportunity to respond to feedback and clarify results if needed. She acknowledged the test results as being largely consistent with her overall functioning and well-being. Although her test results indicated potential under-reporting, it appears she is indeed well-adjusted in her life.      MENTAL STATUS EXAM:  General appearance: appears stated age, neatly dressed, well groomed  Speech: normal rate and tone  Level of cooperation: cooperative  Thought processes: logical, goal-directed  Mood: euthymic  Thought content: no illusions, no visual hallucinations, no auditory hallucinations, no delusions, no active or passive homicidal thoughts, no active or passive suicidal ideation, no obsessions, no compulsions, no violence  Affect: appropriate  Orientation: oriented to person, place, and date  Memory:  Recent memory: 2 of 3 objects after brief delay. (1/1 with categorical cue)  Remote memory: - intact  Attention span and concentration: spelled HOUSE forward and backwards  Fund of general knowledge: 3 of 3 recent presidents  Abstract reasoning:   Similarities: abstract.   Proverbs: abstract.  Judgment and insight: fair  Language: intact      SUMMARY:  Ms. Joyce Peterson is a 63-year-old female referred for presurgical psychological evaluation prior to surgical implantation of a spinal cord stimulator (SCS) to address chronic pain.    There are no indications of disabling psychopathology, substance use/abuse, cognitive problems, or disabilities that would prevent understanding and competence with medical  treatment. There are no reports of major psychosocial stressors that would interfere with her engagement in treatment. There is no evidence of suicidality.  She exhibits high social stability and excellent social support. She has good coping strategies to deal with stress and the demands of surgery and recovery.  She has excellent knowledge about SCS, appropriate expectations for surgery and recovery, adequate understanding of possible risks of this treatment option, and a high willingness to sustain effort for lifestyle changes and health adaptations required. She reports adequate compliance with prior medical regimens.  Results from psychological assessment/testing revealed minimal risks. Based on research and recommendations regarding presurgical psychological screening for pain control procedures, her test results and reports are within the expected range to predict adequate outcomes and patient satisfaction.      IMPRESSIONS AND RECOMMENDATIONS:  Ms. Peterson is a suitable candidate from a psychological perspective.     There are no absolute psychological contraindications to SCS. Ms. Peterson currently reports no psychiatric problems, major psychosocial stressors, or other problems that would contraindicate surgery or impact her ability to engage in treatment. She has adequate and appropriate knowledge and expectations regarding surgery, and is motivated and willing to engage in behaviors to achieve successful outcomes with SCS. She has multiple protective factors that should help her during surgery and recovery. There are no recommendations for psychological intervention at this time.    Diagnostic impressions:    ICD-10-CM ICD-9-CM   1. Preoperative evaluation to rule out surgical contraindication  Z01.818 V72.83   2. Postlaminectomy syndrome of lumbar region  M96.1 722.83   3. Sacroiliac dysfunction  M53.3 724.6   4. Trochanteric bursitis, unspecified laterality  M70.60 726.5        Plan: This report will  be sent to the referring provider with impressions and recommendations. It will be the referring team's decision whether the patient proceeds with surgery. Services related to the presurgical psychological evaluation are now concluded.              Martina Rabago, PhD  Clinical Psychologist

## 2023-12-11 NOTE — PROGRESS NOTES
Ms. Peterson is a suitable candidate from a psychological perspective. There are no absolute psychological contraindications to SCS. There are no significant testing risks, psychiatric problems, or major psychosocial stressors that would contraindicate surgery. She has multiple protective factors that should help her during surgery and recovery. There are no recommendations for psychological intervention at this time.

## 2023-12-11 NOTE — LETTER
December 11, 2023        Pain Management             [facility] [address]  [address]  [facility] [address]  Phone: [phone]   Patient: Joyce Peterson   MR Number: [id number]   YOB: 1960   Date of Visit: 12/11/2023       Dear Dr. Mayfield:    Thank you for referring Joyce Peterson to me for evaluation. Below are the relevant portions of my assessment and plan of care.    Ms. Peterson is a suitable candidate from a psychological perspective. There are no absolute psychological contraindications to SCS. There are no significant testing risks, psychiatric problems, or major psychosocial stressors that would contraindicate surgery. She has multiple protective factors that should help her during surgery and recovery. There are no recommendations for psychological intervention at this time.       If you have questions, please do not hesitate to call me.     Sincerely,      Martina Rabago, PhD  Clinical Psychologist

## 2023-12-11 NOTE — PROGRESS NOTES
Chronic Pain-Tele-Medicine-Established Note (Follow up visit)        The patient location is: Home  The chief complaint leading to consultation is: pain  Visit type: Virtual visit with synchronous audio and video  Total time spent with patient: 12 mins  Each patient to whom he or she provides medical services by telemedicine is:  (1) informed of the relationship between the physician and patient and the respective role of any other health care provider with respect to management of the patient; and (2) notified that he or she may decline to receive medical services by telemedicine and may withdraw from such care at any time.      Subjective:      Patient ID: Joyce Peterson is a 63 y.o. female.    Chief Complaint: No chief complaint on file.    Referred by: No ref. provider found     HPI  She returns for virtual follow up of back and hip pain. At her last OV, Dr. Mayfield recommended [place] SCS trial. She had her first part of the psych consult and has the second part this afternoon. She has had limited benefit with multiple interventions in the past including back surgery, procedures, PT and medications. She would like to move forward with SCS trial. Her pain today is 10/10.    Past Medical History:   Diagnosis Date    Acid reflux     Allergy     Anemia     Asthma     Coronary artery disease     CS (cervical spondylosis) 03/08/2013    Degenerative disc disease     Degenerative joint disease of hindfoot, left 6/28/2022    Dry eyes     Dry mouth     Gastroparesis     History of methotrexate therapy 01/19/2022    Hyperlipidemia     Lateral meniscus derangement 04/06/2016    Lobular carcinoma in situ     Lumbar spondylosis 03/08/2013    Osteoarthritis     Osteoporosis     Pes planovalgus, acquired, left 6/28/2022    Rheumatoid arthritis(714.0)     Rupture of left triceps tendon 10/17/2018    Umbilical hernia 08/13/2015       Past Surgical History:   Procedure Laterality Date    BREAST BIOPSY Left 01/29/2002    core bx     CARPAL TUNNEL RELEASE Right 05/2017    CHOLECYSTECTOMY  2004    COLONOSCOPY      10/11    COLONOSCOPY N/A 6/29/2017    Procedure: COLONOSCOPY;  Surgeon: López Moore MD;  Location: [facility] ENDO (4TH FLR);  Service: Endoscopy;  Laterality: N/A;    EPIDURAL STEROID INJECTION N/A 11/18/2021    Procedure: INJECTION, STEROID, EPIDURAL, L5-S1 IL NEED CONSENT;  Surgeon: Tj Mayfield MD;  Location: [facility], [place], operated by [facility] PAIN MGT;  Service: Pain Management;  Laterality: N/A;    EPIDURAL STEROID INJECTION N/A 9/29/2022    Procedure: LUMBAR L3/L4 IL MADELINE CONTRAST;  Surgeon: Tj Mayfield MD;  Location: [facility] PAIN MGT;  Service: Pain Management;  Laterality: N/A;    EPIDURAL STEROID INJECTION N/A 12/12/2022    Procedure: INJECTION, STEROID, EPIDURAL L5/S1 IL CONTRAST;  Surgeon: Tj Mayfield MD;  Location: [facility], [place], operated by [facility] PAIN MGT;  Service: Pain Management;  Laterality: N/A;    EPIDURAL STEROID INJECTION N/A 4/6/2023    Procedure: INJECTION, STEROID, EPIDURAL L5/S1;  Surgeon: Tj Mayfield MD;  Location: [facility], [place], operated by [facility] PAIN MGT;  Service: Pain Management;  Laterality: N/A;    FOOT ARTHRODESIS Left 1/11/2023    Procedure: FUSION, FOOT;  Surgeon: Ariella Finnegan MD;  Location: [facility] OR;  Service: Orthopedics;  Laterality: Left;  nimbus    FUSION, SPINE, LUMBAR, TLIF, POSTERIOR APPROACH, USING PEDICLE SCREW N/A 6/22/2023    Procedure: FUSION, SPINE, LUMBAR, TLIF, POSTERIOR APPROACH, USING PEDICLE SCREW L4/5 spinewave SNS:SSEP/EMG;  Surgeon: Jh Mosqueda MD;  Location: [facility] OR 2ND FLR;  Service: Neurosurgery;  Laterality: N/A;    HARVESTING OF BONE GRAFT Left 1/11/2023    Procedure: SURGICAL PROCUREMENT, BONE GRAFT;  Surgeon: Ariella Finnegan MD;  Location: [facility] OR;  Service: Orthopedics;  Laterality: Left;    INJECTION OF ANESTHETIC AGENT AROUND NERVE Bilateral 8/23/2021    Procedure: BLOCK, NERVE, MEDIAL BRANCH L3,L4,L5;  Surgeon: Tj Mayfield MD;  Location: [facility], [place], operated by [facility] PAIN MGT;  Service: Pain Management;  Laterality: Bilateral;  1 of 2    INJECTION OF ANESTHETIC AGENT  AROUND NERVE Bilateral 8/26/2021    Procedure: BLOCK, NERVE, MEDIAL BRANCH L3,L4,L5;  Surgeon: Tj Mayfield MD;  Location: [facility];  Service: Pain Management;  Laterality: Bilateral;  2 of 2    INJECTION OF ANESTHETIC AGENT AROUND NERVE Left 7/7/2022    Procedure: BLOCK, NERVE, LEFT OBTURATOR AND FEMORAL;  Surgeon: Tj Mayfield MD;  Location: [facility];  Service: Pain Management;  Laterality: Left;    INJECTION OF FACET JOINT Bilateral 3/12/2020    Procedure: FACET JOINT INJECTION (LUMBAR BLOCK) BILATERAL L4-5 AND L5-S1 DIRECT REFERRAL;  Surgeon: Tj Mayfield MD;  Location: [facility];  Service: Pain Management;  Laterality: Bilateral;  NEEDS CONSENT    INJECTION OF FACET JOINT Bilateral 3/29/2021    Procedure: INJECTION, FACET JOINT L3/4, L4/5, L5/S1;  Surgeon: Tj Mayfield MD;  Location: [facility];  Service: Pain Management;  Laterality: Bilateral;    INJECTION OF JOINT Right 7/30/2020    Procedure: INJECTION, JOINT, RIGHT HIP Interarticular under flouro;  Surgeon: Tj Mayfield MD;  Location: [facility];  Service: Pain Management;  Laterality: Right;  INJECTION, JOINT, RIGHT HIP Interarticular under flouro    INJECTION OF JOINT Right 2/21/2022    Procedure: Injection, Joint RIGHT ISCHIAL BURSA;  Surgeon: Tj Mayfield MD;  Location: [facility];  Service: Pain Management;  Laterality: Right;    INTRAMEDULLARY RODDING OF FEMUR Left 5/21/2019    Procedure: INSERTION, INTRAMEDULLARY ARIEL, FEMUR;  Surgeon: López Duff MD;  [address]  Service: Orthopedics;  Laterality: Left;    LENGTHENING OF TENDON OF LOWER EXTREMITY Left 1/11/2023    Procedure: LENGTHENING, TENDON, LOWER EXTREMITY;  Surgeon: Ariella Finnegan MD;  Location: [facility] OR;  Service: Orthopedics;  Laterality: Left;    MAGNETIC RESONANCE IMAGING N/A 5/29/2023    Procedure: MRI (Magnetic Resonance Imagine);  Surgeon: Sujey Surgeon;  Location: [facility];  Service: Anesthesiology;  Laterality: N/A;    RADIOFREQUENCY  ABLATION Left 9/20/2021    Procedure: RADIOFREQUENCY ABLATION left L3,4,5 RFA  1 of 2  CONSENT NEEDED;  Surgeon: Tj Mayfield MD;  Location: [facility] PAIN [facility]GT;  Service: Pain Management;  Laterality: Left;    RADIOFREQUENCY ABLATION Right 10/4/2021    Procedure: RADIOFREQUENCY ABLATION  right L3,4,5 RFA   2 of 2  CONSENT NEEDED;  Surgeon: Tj Mayfield MD;  Location: [facility] PAIN [facility]GT;  Service: Pain Management;  Laterality: Right;    RADIOFREQUENCY ABLATION Left 4/21/2022    Procedure: Radiofrequency Ablation LEFT L3,L4,L5;  Surgeon: Tj Mayfield MD;  Location: [facility] PAIN [facility]GT;  Service: Pain Management;  Laterality: Left;    RADIOFREQUENCY ABLATION Right 5/12/2022    Procedure: Radiofrequency Ablation RIGHT L3,L4,L5;  Surgeon: Tj Mayfield MD;  Location: [facility]IN [facility]GT;  Service: Pain Management;  Laterality: Right;    RADIOFREQUENCY ABLATION Left 7/25/2022    Procedure: Radiofrequency Ablation Left Femoral & Obturator;  Surgeon: Tj Mayfield MD;  Location: [facility] PAIN [facility]GT;  Service: Pain Management;  Laterality: Left;    REPAIR OF TRICEPS TENDON Left 10/17/2018    Procedure: REPAIR, TENDON, TRICEPS left elbow;  Surgeon: Staci Yarbrough MD;  Location: [facility] OR [address] [place] [address];  Service: Orthopedics;  Laterality: Left;  Anesthesia: General and regional. PRONE, k-wire , hand pan 1 and pan 2, CALL ARTHREX/Tamera notified 10-12 LO    TONSILLECTOMY      TRANSFORAMINAL EPIDURAL INJECTION OF STEROID Right 8/31/2020    Procedure: INJECTION, STEROID, EPIDURAL, TRANSFORAMINAL,  APPROACH, L3-L4 and L4-L5 need consent;  Surgeon: Tj Mayfield MD;  Location: [facility];  Service: Pain Management;  Laterality: Right;    TRANSFORAMINAL EPIDURAL INJECTION OF STEROID Bilateral 7/23/2021    Procedure: INJECTION, STEROID, EPIDURAL, TRANSFORAMINAL APPROACH L4/5;  Surgeon: Tj Mayfield MD;  Location: [facility] PAIN MGT;  Service: Pain Management;  Laterality: Bilateral;    TRANSFORAMINAL EPIDURAL INJECTION OF STEROID Bilateral 9/25/2023     "Procedure: LUMBAR TRANSFORAMINAL BILATERAL  L2/3 DIRECT REFERRAL;  Surgeon: Tj Mayfield MD;  Location: [facility];  Service: Pain Management;  Laterality: Bilateral;    TRANSFORAMINAL EPIDURAL INJECTION OF STEROID Left 10/18/2023    Procedure: LUMBAR TRANSFORAMINAL LEFT L3/4 AND L4/5 * CLEARANCE IN CHART*;  Surgeon: Tj Mayfield MD;  Location: [facility];  Service: Pain Management;  Laterality: Left;    TRIGGER POINT INJECTION N/A 7/23/2021    Procedure: INJECTION, TRIGGER POINT SCAPULAR;  Surgeon: Tj Mayfield MD;  Location: [facility] PAIN MGT;  Service: Pain Management;  Laterality: N/A;    TUBAL LIGATION  2003    UPPER GASTROINTESTINAL ENDOSCOPY      10/11    uterine ablation  2003       Review of patient's allergies indicates:   Allergen Reactions    Actemra [tocilizumab] Other (See Comments)     Severe dizziness    Codeine Nausea And Vomiting and Hives    Gold au 198 Hives and Rash    Hydroxychloroquine Other (See Comments)     Can't remember the reaction      Iodinated contrast media Other (See Comments)     Other reaction(s): BURNING ALL OVER    Iodine Other (See Comments) and Hives     Other reaction(s): BURNING ALL OVER - Iodine dye - Not topical    Ondansetron Nausea And Vomiting    Sulfa (sulfonamide antibiotics) Other (See Comments)     Can't remember the reaction    Zofran [ondansetron hcl (pf)] Nausea And Vomiting     Pt reports that last time she received zofran she started vomiting again    Methotrexate analogues Nausea Only    Pneumococcal vaccine Other (See Comments)    Pneumovax 23 [pneumococcal 23-argenis ps vaccine] Other (See Comments)     "sick"    Methotrexate Nausea Only       Current Outpatient Medications   Medication Sig Dispense Refill    albuterol (PROVENTIL/VENTOLIN HFA) 90 mcg/actuation inhaler Inhale 2 puffs into the lungs every 6 (six) hours as needed. Rescue 20.1 g 11    albuterol-ipratropium (DUO-NEB) 2.5 mg-0.5 mg/3 mL nebulizer solution Take 3 mLs by nebulization every 6 " (six) hours as needed for Wheezing. Rescue 90 mL 3    azelastine (ASTELIN) 137 mcg (0.1 %) nasal spray Use 1 spray (137 mcg total) in each nostril 2 (two) times daily. 30 mL 1    budesonide-formoterol 160-4.5 mcg (SYMBICORT) 160-4.5 mcg/actuation HFAA Inhale 2 puffs into the lungs every 12 (twelve) hours. 30.6 g 3    calcium citrate-vitamin D3 315-200 mg (CITRACAL+D) 315 mg-5 mcg (200 unit) per tablet Take 1 tablet by mouth 2 (two) times daily.       cycloSPORINE (RESTASIS) 0.05 % ophthalmic emulsion PLACE 1 DROP INTO BOTH EYES TWICE A  each 3    diclofenac sodium (VOLTAREN) 1 % Gel APPLY 2 GRAMS TOPICALLY 4 (FOUR) TIMES DAILY AS NEEDED. 300 g 2    fluconazole (DIFLUCAN) 150 MG Tab Take 150 mg by mouth as needed.      fluticasone propionate (FLONASE) 50 mcg/actuation nasal spray Use 1 spray (50 mcg total) in each nostril once daily. 16 g 1    INV PROPULSID 10 MG Take 2 tablets (20 mg) by mouth 4 (four) times daily. FOR INVESTIGATIONAL USE ONLY. Protocol [id number] Subject ID: [id number]. 1440 each 0    leflunomide (ARAVA) 20 MG Tab Take 1 tablet (20 mg total) by mouth once daily. 90 tablet 0    lifitegrast (XIIDRA) 5 % Dpet INSTILL ONE DROP INTO BOTH EYES TWICE A DAY 60 mL 10    methenamine (HIPREX) 1 gram Tab Take 1 tablet (1 g total) by mouth 2 (two) times daily. 180 tablet 3    methocarbamoL (ROBAXIN) 750 MG Tab Take 1 tablet (750 mg total) by mouth 3 (three) times daily as needed. 90 tablet 1    mirabegron (MYRBETRIQ) 25 mg Tb24 ER tablet Take 1 tablet (25 mg total) by mouth once daily. 90 tablet 3    montelukast (SINGULAIR) 10 mg tablet Take 1 tablet (10 mg total) by mouth every evening. 90 tablet 3    naproxen (NAPROSYN) 500 MG tablet TAKE 1 TABLET (500 MG TOTAL) BY MOUTH 2 (TWO) TIMES DAILY AS NEEDED 180 tablet 1    omeprazole (PRILOSEC) 40 MG capsule Take 1 capsule (40 mg total) by mouth every morning. 30 capsule 6    omeprazole-sodium bicarbonate (ZEGERID) 40-1.1 mg-gram per capsule TAKE 1 CAPSULE BY  MOUTH EVERY DAY IN THE MORNING 90 capsule 3    oxyCODONE-acetaminophen (PERCOCET) 5-325 mg per tablet Take 1 tablet by mouth every 6 (six) hours as needed for Pain. 120 tablet 0    POTASSIUM ORAL Take by mouth once daily.      predniSONE (DELTASONE) 5 MG tablet TAKE 1 TABLET BY MOUTH EVERY DAY 90 tablet 1    pregabalin (LYRICA) 150 MG capsule Take 1 capsule (150 mg total) by mouth 3 (three) times daily. 90 capsule 2    promethazine (PHENERGAN) 25 MG tablet Take 1 tablet (25 mg total) by mouth every 6 (six) hours as needed for Nausea. 15 tablet 0    rosuvastatin (CRESTOR) 20 MG tablet Take 1 tablet by mouth every day in the evening 90 tablet 3    sarilumab (KEVZARA) 200 mg/1.14 mL Syrg Inject 1.14 mL (200 mg total) into the skin every 14 (fourteen) days. 2.28 mL 1    sucralfate (CARAFATE) 1 gram tablet TAKE 1 TABLET (1 G TOTAL) BY MOUTH 4 (FOUR) TIMES DAILY. 360 tablet 2     No current facility-administered medications for this visit.       Family History   Problem Relation Age of Onset    Cancer Mother         Lung Cancer    Emphysema Mother     Heart attack Mother     Alcohol abuse Mother     Cancer Father         Lung Cancer    Osteoarthritis Father     Lung cancer Father     Skin cancer Father     Alcohol abuse Father     Heart disease Brother     Heart attack Brother     Alcohol abuse Brother     Cirrhosis Brother     Osteoarthritis Paternal Aunt     Retinal detachment Maternal Aunt     No Known Problems Sister     No Known Problems Maternal Uncle     No Known Problems Paternal Uncle     No Known Problems Maternal Grandmother     No Known Problems Maternal Grandfather     No Known Problems Paternal Grandmother     No Known Problems Paternal Grandfather     Colon cancer Neg Hx     Esophageal cancer Neg Hx     Stomach cancer Neg Hx     Celiac disease Neg Hx     Diabetes Neg Hx     Thyroid disease Neg Hx     Amblyopia Neg Hx     Blindness Neg Hx     Cataracts Neg Hx     Glaucoma Neg Hx     Hypertension Neg Hx      Macular degeneration Neg Hx     Strabismus Neg Hx     Stroke Neg Hx        Social History     Socioeconomic History    Marital status:    Tobacco Use    Smoking status: Never    Smokeless tobacco: Never   Substance and Sexual Activity    Alcohol use: Yes     Comment: 2-3 times per year.    Drug use: No    Sexual activity: Yes     Partners: Male     Birth control/protection: Surgical     Social Determinants of Health     Financial Resource Strain: Unknown (12/5/2023)    Overall Financial Resource Strain (CARDIA)     Difficulty of Paying Living Expenses: Patient refused   Food Insecurity: Unknown (12/5/2023)    Hunger Vital Sign     Worried About Running Out of Food in the Last Year: Patient refused     Ran Out of Food in the Last Year: Patient refused   Transportation Needs: Unknown (12/5/2023)    PRAPARE - Transportation     Lack of Transportation (Medical): Patient refused     Lack of Transportation (Non-Medical): Patient refused   Physical Activity: Unknown (12/5/2023)    Exercise Vital Sign     Days of Exercise per Week: Patient refused     Minutes of Exercise per Session: 0 min   Recent Concern: Physical Activity - Inactive (9/6/2023)    Exercise Vital Sign     Days of Exercise per Week: 0 days     Minutes of Exercise per Session: 0 min   Stress: Unknown (12/5/2023)    [employer] - Occupational Stress Questionnaire     Feeling of Stress : Patient refused   Social Connections: Unknown (12/5/2023)    Social Connection and Isolation Panel [NHANES]     Frequency of Communication with Friends and Family: Patient refused     Frequency of Social Gatherings with Friends and Family: Patient refused     Attends Rastafarian Services: More than 4 times per year     Active Member of Clubs or Organizations: Patient refused     Attends Club or Organization Meetings: Patient refused     Marital Status: Patient refused   Housing Stability: Unknown (12/5/2023)    Housing Stability Vital Sign      Unable to Pay for Housing in the Last Year: Patient refused     Number of Places Lived in the Last Year: 1     Unstable Housing in the Last Year: No           ROS        Objective:   LMP  (LMP Unknown)   Pain Disability Index Review:      11/28/2023    11:25 AM 5/15/2023     9:40 AM 3/8/2023     2:49 PM   Last 3 PDI Scores   Pain Disability Index (PDI) 51 41 24         PHYSICAL EXAMINATION:    General appearance: Well appearing, in no acute distress, alert and oriented x3.  Psych:  Mood and affect appropriate.  Skin: Skin color normal, no rashes or lesions, in both upper and lower body.  Extremities: Moves all visualized extremities freely.      PREVIOUS PHYSICAL EXAM:  Normocephalic.  Atraumatic.  Affect appropriate.  Breathing unlabored.  Extra ocular muscles intact.           General    Vitals reviewed.    General Musculoskeletal Exam   Gait: antalgic     Right Ankle/Foot Exam     Tests   Heel Walk: able to perform  Tiptoe Walk: able to perform    Left Ankle/Foot Exam     Tests   Heel Walk: able to perform  Tiptoe Walk: able to perform      Right Hip Exam     Range of Motion   External rotation:  normal   Internal rotation:  normal     Tests   Pain w/ forced internal rotation (SANDRA): absent  Elias: negative    Other   Sensation: normal  Left Hip Exam     Range of Motion   External rotation:  normal   Internal rotation: normal     Tests   Pain w/ forced internal rotation (SANDRA): absent  Elias: negative    Other   Sensation: normal      Back (L-Spine & T-Spine) / Neck (C-Spine) Exam     Back (L-Spine & T-Spine) Range of Motion   Extension:  abnormal Back extension: Limited due to pain.  Flexion:  abnormal     Back (L-Spine & T-Spine) Tests   Right Side Tests  Femoral Stretch: negative  Left Side Tests  Femoral Stretch: negative    Comments:   positive sacroiliac joint pain with finger Destin test, thigh thrust test and compression test.  Negative SANDRA's      Muscle Strength   Right Lower Extremity   Hip Flexion:  5/5   Quadriceps:  5/5   Hamstrin/5   Gastrocsoleus:  5/5   EHL:  5/5  Left Lower Extremity   Hip Flexion: 5/5   Quadriceps:  5/5   Hamstrin/5   Gastrocsoleus:  5/5   EHL:  5/5    Reflexes     Left Side  Babinski Sign:  absent  Ankle Clonus:  absent    Right Side   Babinski Sign:  absent  Ankle Clonus:  absent    Vascular Exam       Capillary Refill  Right Hand: normal capillary refill  Left Hand: normal capillary refill        Edema  Right Upper Leg: absent  Left Upper Leg: absent        Assessment:       Encounter Diagnoses   Name Primary?    Postlaminectomy syndrome of lumbar region Yes    Myofascial pain     Chronic pain syndrome     Spinal stenosis of lumbar region, unspecified whether neurogenic claudication present     Lumbosacral radiculopathy            Plan:   We discussed with the patient the assessment and recommendations. The following is the plan we agreed on:  SCS trial [place]. She has her second part of the psych consult today. Will await clearance then place orders.  Continue current medications.  The patient will continue a home exercise routine to help with pain and strengthening.    RTC 1 week after trial.        The above plan and management options were discussed at length with patient. Patient is in agreement with the above and verbalized understanding.     JAYME Sanchez  2023

## 2023-12-12 ENCOUNTER — RESEARCH ENCOUNTER (OUTPATIENT)
Dept: RESEARCH | Facility: OTHER | Age: 63
End: 2023-12-12
Payer: MEDICARE

## 2023-12-12 ENCOUNTER — OFFICE VISIT (OUTPATIENT)
Dept: PODIATRY | Facility: CLINIC | Age: 63
End: 2023-12-12
Payer: MEDICARE

## 2023-12-12 VITALS — WEIGHT: 136 LBS | BODY MASS INDEX: 25.68 KG/M2 | HEIGHT: 61 IN

## 2023-12-12 DIAGNOSIS — M20.41 HAMMER TOE OF RIGHT FOOT: ICD-10-CM

## 2023-12-12 DIAGNOSIS — M05.79 RHEUMATOID ARTHRITIS INVOLVING MULTIPLE SITES WITH POSITIVE RHEUMATOID FACTOR: ICD-10-CM

## 2023-12-12 DIAGNOSIS — L97.511 ULCER OF RIGHT FOOT, LIMITED TO BREAKDOWN OF SKIN: Primary | ICD-10-CM

## 2023-12-12 PROCEDURE — 3008F PR BODY MASS INDEX (BMI) DOCUMENTED: ICD-10-PCS | Mod: CPTII,S$GLB,, | Performed by: PODIATRIST

## 2023-12-12 PROCEDURE — 99999 PR PBB SHADOW E&M-EST. PATIENT-LVL V: ICD-10-PCS | Mod: PBBFAC,,, | Performed by: PODIATRIST

## 2023-12-12 PROCEDURE — 99999 PR PBB SHADOW E&M-EST. PATIENT-LVL V: CPT | Mod: PBBFAC,,, | Performed by: PODIATRIST

## 2023-12-12 PROCEDURE — 99213 PR OFFICE/OUTPT VISIT, EST, LEVL III, 20-29 MIN: ICD-10-PCS | Mod: S$GLB,,, | Performed by: PODIATRIST

## 2023-12-12 PROCEDURE — 3008F BODY MASS INDEX DOCD: CPT | Mod: CPTII,S$GLB,, | Performed by: PODIATRIST

## 2023-12-12 PROCEDURE — 1159F MED LIST DOCD IN RCRD: CPT | Mod: CPTII,S$GLB,, | Performed by: PODIATRIST

## 2023-12-12 PROCEDURE — 1159F PR MEDICATION LIST DOCUMENTED IN MEDICAL RECORD: ICD-10-PCS | Mod: CPTII,S$GLB,, | Performed by: PODIATRIST

## 2023-12-12 PROCEDURE — 1160F RVW MEDS BY RX/DR IN RCRD: CPT | Mod: CPTII,S$GLB,, | Performed by: PODIATRIST

## 2023-12-12 PROCEDURE — 99213 OFFICE O/P EST LOW 20 MIN: CPT | Mod: S$GLB,,, | Performed by: PODIATRIST

## 2023-12-12 PROCEDURE — 1160F PR REVIEW ALL MEDS BY PRESCRIBER/CLIN PHARMACIST DOCUMENTED: ICD-10-PCS | Mod: CPTII,S$GLB,, | Performed by: PODIATRIST

## 2023-12-12 NOTE — PROGRESS NOTES
[facility] [place] [facility]  [address] [place] [address], [address]  [facility] [address]  Dept: [phone]  Dept Fax: [phone]    Isiah Mark Jr., DPM     Assessment:   MDM    Coding  1. Ulcer of right foot, limited to breakdown of skin        2. Rheumatoid arthritis involving multiple sites with positive rheumatoid factor        3. Hammer toe of right foot            Plan:     Procedures  Diagnoses and all orders for this visit:    Ulcer of right foot, limited to breakdown of skin    Rheumatoid arthritis involving multiple sites with positive rheumatoid factor    Hammer toe of right foot        -pt seen, evaluated, and managed  -dx discussed in detail. All questions/concerns addressed  -all tx options discussed. All alternatives, risks, benefits of all txs discussed  -the patient was educated about the diagnosis  -clinically a R 3rd toe pressure ulceration from RA deformity of toe  -this is an ulceration without s/s of infection, that is stable/improving  -no debridement needed today   -collagen based DSD applied. Pt to change QD at home    -rxs dispensed: none  -referrals: none  -WB: wbat    Short-term goals include but are not limited to: maintaining good offloading and minimizing bioburden, promoting granulation and epithelialization to healing.  Wound healing is a medically reasonable expectation based on the clinical circumstances documented.    Long-term goals include but are not limited to: keeping the wound healed by good offloading and medical management under the direction of internist.    Advised pt of risks of current condition including but not limited to: worsening of infection, potential for limb loss    Follow-up: Patient is to return to the clinic in 4 week for follow-up but [facility] if any signs of infection, such as fever, chills, sweats, increased redness or pain.    Follow up in 4 weeks (on 1/9/2024), or if symptoms worsen or fail to improve.    Subjective:       Patient ID: Joyce Peterson is a 63 y.o. female.    Chief Complaint:   No chief complaint on file.      CC - foot ulcer: Patient presents to the clinic for evaluation and treatment of high risk feet. The patient's chief complaint is foot ulcer, R foot. duration: several wks. denies n/v/f/c.    Of note - she is s/p left triple arthrodesis, midfoot arthrodesis, ARLEN, CBG on 1/11/23 w/ dr. Finnegan. Overall she is very happy with result of L foot reconstruction.      12/12/23:  Hx as above. F/u for ulceration R foot. Denies n/v/f/c    Foot Ulcer    Toe Pain         Last Podiatry Enc: Visit date not found  Last Enc w/ Me: Visit date not found    Outside reports reviewed: historical medical records.  Family hx: as below  Past Medical History:   Diagnosis Date    Acid reflux     Allergy     Anemia     Asthma     Coronary artery disease     CS (cervical spondylosis) 03/08/2013    Degenerative disc disease     Degenerative joint disease of hindfoot, left 6/28/2022    Dry eyes     Dry mouth     Gastroparesis     History of methotrexate therapy 01/19/2022    Hyperlipidemia     Lateral meniscus derangement 04/06/2016    Lobular carcinoma in situ     Lumbar spondylosis 03/08/2013    Osteoarthritis     Osteoporosis     Pes planovalgus, acquired, left 6/28/2022    Rheumatoid arthritis(714.0)     Rupture of left triceps tendon 10/17/2018    Umbilical hernia 08/13/2015     Past Surgical History:   Procedure Laterality Date    BREAST BIOPSY Left 01/29/2002    core bx    CARPAL TUNNEL RELEASE Right 05/2017    CHOLECYSTECTOMY  2004    COLONOSCOPY      10/11    COLONOSCOPY N/A 6/29/2017    Procedure: COLONOSCOPY;  Surgeon: López Moore MD;  Location: [facility] ENDO ([address] [place] [address]);  Service: Endoscopy;  Laterality: N/A;    EPIDURAL STEROID INJECTION N/A 11/18/2021    Procedure: INJECTION, STEROID, EPIDURAL, L5-S1 IL NEED CONSENT;  Surgeon: Tj Mayfield MD;  Location: [facility] PAIN T;  Service: Pain Management;  Laterality: N/A;    EPIDURAL  STEROID INJECTION N/A 9/29/2022    Procedure: LUMBAR L3/L4 IL MADELINE CONTRAST;  Surgeon: Tj Mayfield MD;  Location: [facility] PAIN MGT;  Service: Pain Management;  Laterality: N/A;    EPIDURAL STEROID INJECTION N/A 12/12/2022    Procedure: INJECTION, STEROID, EPIDURAL L5/S1 IL CONTRAST;  Surgeon: Tj Mayfield MD;  Location: [facility]IN [facility]GT;  Service: Pain Management;  Laterality: N/A;    EPIDURAL STEROID INJECTION N/A 4/6/2023    Procedure: INJECTION, STEROID, EPIDURAL L5/S1;  Surgeon: Tj Mayfield MD;  Location: [facility] PAIN MGT;  Service: Pain Management;  Laterality: N/A;    FOOT ARTHRODESIS Left 1/11/2023    Procedure: FUSION, FOOT;  Surgeon: Ariella Finnegan MD;  Location: [facility] OR;  Service: Orthopedics;  [facility]: [facility]  [facility]    FUSION, SPINE, LUMBAR, TLIF, POSTERIOR APPROACH, USING PEDICLE SCREW N/A 6/22/2023    Procedure: FUSION, SPINE, LUMBAR, TLIF, POSTERIOR APPROACH, USING PEDICLE SCREW L4/5 spinewave SNS:SSEP/EMG;  Surgeon: Jh Mosqueda MD;  Location: [facility] OR [address] [place] [address];  Service: Neurosurgery;  Laterality: N/A;    HARVESTING OF BONE GRAFT Left 1/11/2023    Procedure: SURGICAL PROCUREMENT, BONE GRAFT;  Surgeon: Ariella Finnegan MD;  Location: [facility] OR;  Service: Orthopedics;  Laterality: Left;    INJECTION OF ANESTHETIC AGENT AROUND NERVE Bilateral 8/23/2021    Procedure: BLOCK, NERVE, MEDIAL BRANCH L3,L4,L5;  Surgeon: Tj Mayfield MD;  Location: [facility] PAIN MGT;  Service: Pain Management;  Laterality: Bilateral;  1 of 2    INJECTION OF ANESTHETIC AGENT AROUND NERVE Bilateral 8/26/2021    Procedure: BLOCK, NERVE, MEDIAL BRANCH L3,L4,L5;  Surgeon: Tj Mayfield MD;  Location: [facility] PAIN MGT;  Service: Pain Management;  Laterality: Bilateral;  2 of 2    INJECTION OF ANESTHETIC AGENT AROUND NERVE Left 7/7/2022    Procedure: BLOCK, NERVE, LEFT OBTURATOR AND FEMORAL;  Surgeon: Tj Mayfield MD;  Location: [facility] PAIN MGT;  Service: Pain Management;  Laterality: Left;    INJECTION OF FACET JOINT Bilateral 3/12/2020     Procedure: FACET JOINT INJECTION (LUMBAR BLOCK) BILATERAL L4-5 AND L5-S1 DIRECT REFERRAL;  Surgeon: Tj Mayfield MD;  Location: [facility] PAIN MGT;  Service: Pain Management;  Laterality: Bilateral;  NEEDS CONSENT    INJECTION OF FACET JOINT Bilateral 3/29/2021    Procedure: INJECTION, FACET JOINT L3/4, L4/5, L5/S1;  Surgeon: Tj Mayfield MD;  Location: [facility] PAIN MGT;  Service: Pain Management;  Laterality: Bilateral;    INJECTION OF JOINT Right 7/30/2020    Procedure: INJECTION, JOINT, RIGHT HIP Interarticular under flouro;  Surgeon: Tj Mayfield MD;  Location: [facility];  Service: Pain Management;  Laterality: Right;  INJECTION, JOINT, RIGHT HIP Interarticular under flouro    INJECTION OF JOINT Right 2/21/2022    Procedure: Injection, Joint RIGHT ISCHIAL BURSA;  Surgeon: Tj Mayfield MD;  Location: [facility] PAIN MGT;  Service: Pain Management;  Laterality: Right;    INTRAMEDULLARY RODDING OF FEMUR Left 5/21/2019    Procedure: INSERTION, INTRAMEDULLARY ARIEL, FEMUR;  Surgeon: López Duff MD;  [address]  Service: Orthopedics;  Laterality: Left;    LENGTHENING OF TENDON OF LOWER EXTREMITY Left 1/11/2023    Procedure: LENGTHENING, TENDON, LOWER EXTREMITY;  Surgeon: Ariella Finnegan MD;  Location: [facility];  Service: Orthopedics;  Laterality: Left;    MAGNETIC RESONANCE IMAGING N/A 5/29/2023    Procedure: MRI (Magnetic Resonance Imagine);  Surgeon: Sujey Robin;  Location: [facility];  Service: Anesthesiology;  Laterality: N/A;    RADIOFREQUENCY ABLATION Left 9/20/2021    Procedure: RADIOFREQUENCY ABLATION left L3,4,5 RFA  1 of 2  CONSENT NEEDED;  Surgeon: Tj Mayfield MD;  Location: [facility] PAIN MGT;  Service: Pain Management;  Laterality: Left;    RADIOFREQUENCY ABLATION Right 10/4/2021    Procedure: RADIOFREQUENCY ABLATION  right L3,4,5 RFA   2 of 2  CONSENT NEEDED;  Surgeon: Tj Mayfield MD;  Location: [facility] PAIN MGT;  Service: Pain Management;  Laterality: Right;    RADIOFREQUENCY ABLATION Left 4/21/2022     Procedure: Radiofrequency Ablation LEFT L3,L4,L5;  Surgeon: Tj Mayfield MD;  Location: [facility];  Service: Pain Management;  Laterality: Left;    RADIOFREQUENCY ABLATION Right 5/12/2022    Procedure: Radiofrequency Ablation RIGHT L3,L4,L5;  Surgeon: Tj Mayfield MD;  Location: [facility];  Service: Pain Management;  Laterality: Right;    RADIOFREQUENCY ABLATION Left 7/25/2022    Procedure: Radiofrequency Ablation Left Femoral & Obturator;  Surgeon: Tj Mayfield MD;  Location: [facility];  Service: Pain Management;  Laterality: Left;    REPAIR OF TRICEPS TENDON Left 10/17/2018    Procedure: REPAIR, TENDON, TRICEPS left elbow;  Surgeon: Staci Yarbrough MD;  Location: [facility] OR [address] [place] [address];  Service: Orthopedics;  Laterality: Left;  Anesthesia: General and regional. PRONE, k-wire , hand pan 1 and pan 2, CALL ARTHREX/Tamera notified 10-12 LO    TONSILLECTOMY      TRANSFORAMINAL EPIDURAL INJECTION OF STEROID Right 8/31/2020    Procedure: INJECTION, STEROID, EPIDURAL, TRANSFORAMINAL,  APPROACH, L3-L4 and L4-L5 need consent;  Surgeon: Tj Mayfield MD;  Location: [facility];  Service: Pain Management;  Laterality: Right;    TRANSFORAMINAL EPIDURAL INJECTION OF STEROID Bilateral 7/23/2021    Procedure: INJECTION, STEROID, EPIDURAL, TRANSFORAMINAL APPROACH L4/5;  Surgeon: Tj Mayfield MD;  Location: [facility];  Service: Pain Management;  Laterality: Bilateral;    TRANSFORAMINAL EPIDURAL INJECTION OF STEROID Bilateral 9/25/2023    Procedure: LUMBAR TRANSFORAMINAL BILATERAL  L2/3 DIRECT REFERRAL;  Surgeon: Tj Mayfield MD;  Location: [facility];  Service: Pain Management;  Laterality: Bilateral;    TRANSFORAMINAL EPIDURAL INJECTION OF STEROID Left 10/18/2023    Procedure: LUMBAR TRANSFORAMINAL LEFT L3/4 AND L4/5 * CLEARANCE IN CHART*;  Surgeon: Tj Mayfield MD;  Location: [facility];  Service: Pain Management;  Laterality: Left;    TRIGGER POINT INJECTION N/A 7/23/2021    Procedure:  INJECTION, TRIGGER POINT SCAPULAR;  Surgeon: Tj Mayfield MD;  Location: [facility] PAIN MGT;  Service: Pain Management;  Laterality: N/A;    TUBAL LIGATION  2003    UPPER GASTROINTESTINAL ENDOSCOPY      10/11    uterine ablation  2003     Family History   Problem Relation Age of Onset    Cancer Mother         Lung Cancer    Emphysema Mother     Heart attack Mother     Alcohol abuse Mother     Cancer Father         Lung Cancer    Osteoarthritis Father     Lung cancer Father     Skin cancer Father     Alcohol abuse Father     Heart disease Brother     Heart attack Brother     Alcohol abuse Brother     Cirrhosis Brother     Osteoarthritis Paternal Aunt     Retinal detachment Maternal Aunt     No Known Problems Sister     No Known Problems Maternal Uncle     No Known Problems Paternal Uncle     No Known Problems Maternal Grandmother     No Known Problems Maternal Grandfather     No Known Problems Paternal Grandmother     No Known Problems Paternal Grandfather     Colon cancer Neg Hx     Esophageal cancer Neg Hx     Stomach cancer Neg Hx     Celiac disease Neg Hx     Diabetes Neg Hx     Thyroid disease Neg Hx     Amblyopia Neg Hx     Blindness Neg Hx     Cataracts Neg Hx     Glaucoma Neg Hx     Hypertension Neg Hx     Macular degeneration Neg Hx     Strabismus Neg Hx     Stroke Neg Hx      Current Outpatient Medications   Medication Sig Dispense Refill    albuterol (PROVENTIL/VENTOLIN HFA) 90 mcg/actuation inhaler Inhale 2 puffs into the lungs every 6 (six) hours as needed. Rescue 20.1 g 11    azelastine (ASTELIN) 137 mcg (0.1 %) nasal spray Use 1 spray (137 mcg total) in each nostril 2 (two) times daily. 30 mL 1    budesonide-formoterol 160-4.5 mcg (SYMBICORT) 160-4.5 mcg/actuation HFAA Inhale 2 puffs into the lungs every 12 (twelve) hours. 30.6 g 3    calcium citrate-vitamin D3 315-200 mg (CITRACAL+D) 315 mg-5 mcg (200 unit) per tablet Take 1 tablet by mouth 2 (two) times daily.       cycloSPORINE (RESTASIS) 0.05 %  ophthalmic emulsion PLACE 1 DROP INTO BOTH EYES TWICE A  each 3    diclofenac sodium (VOLTAREN) 1 % Gel APPLY 2 GRAMS TOPICALLY 4 (FOUR) TIMES DAILY AS NEEDED. 300 g 2    fluconazole (DIFLUCAN) 150 MG Tab Take 150 mg by mouth as needed.      fluticasone propionate (FLONASE) 50 mcg/actuation nasal spray Use 1 spray (50 mcg total) in each nostril once daily. 16 g 1    INV PROPULSID 10 MG Take 2 tablets (20 mg) by mouth 4 (four) times daily. FOR INVESTIGATIONAL USE ONLY. Protocol [id number] Subject ID: [id number]. 1440 each 0    leflunomide (ARAVA) 20 MG Tab Take 1 tablet (20 mg total) by mouth once daily. 90 tablet 0    lifitegrast (XIIDRA) 5 % Dpet INSTILL ONE DROP INTO BOTH EYES TWICE A DAY 60 mL 10    methenamine (HIPREX) 1 gram Tab Take 1 tablet (1 g total) by mouth 2 (two) times daily. 180 tablet 3    methocarbamoL (ROBAXIN) 750 MG Tab Take 1 tablet (750 mg total) by mouth 3 (three) times daily as needed. 90 tablet 1    mirabegron (MYRBETRIQ) 25 mg Tb24 ER tablet Take 1 tablet (25 mg total) by mouth once daily. 90 tablet 3    montelukast (SINGULAIR) 10 mg tablet Take 1 tablet (10 mg total) by mouth every evening. 90 tablet 3    naproxen (NAPROSYN) 500 MG tablet TAKE 1 TABLET (500 MG TOTAL) BY MOUTH 2 (TWO) TIMES DAILY AS NEEDED 180 tablet 1    omeprazole (PRILOSEC) 40 MG capsule Take 1 capsule (40 mg total) by mouth every morning. 30 capsule 6    omeprazole-sodium bicarbonate (ZEGERID) 40-1.1 mg-gram per capsule TAKE 1 CAPSULE BY MOUTH EVERY DAY IN THE MORNING 90 capsule 3    oxyCODONE-acetaminophen (PERCOCET) 5-325 mg per tablet Take 1 tablet by mouth every 6 (six) hours as needed for Pain. 120 tablet 0    POTASSIUM ORAL Take by mouth once daily.      predniSONE (DELTASONE) 5 MG tablet TAKE 1 TABLET BY MOUTH EVERY DAY 90 tablet 1    pregabalin (LYRICA) 150 MG capsule Take 1 capsule (150 mg total) by mouth 3 (three) times daily. 90 capsule 2    promethazine (PHENERGAN) 25 MG tablet Take 1 tablet (25 mg total)  "by mouth every 6 (six) hours as needed for Nausea. 15 tablet 0    rosuvastatin (CRESTOR) 20 MG tablet Take 1 tablet by mouth every day in the evening 90 tablet 3    sarilumab (KEVZARA) 200 mg/1.14 mL Syrg Inject 1.14 mL (200 mg total) into the skin every 14 (fourteen) days. 2.28 mL 1    sucralfate (CARAFATE) 1 gram tablet TAKE 1 TABLET (1 G TOTAL) BY MOUTH 4 (FOUR) TIMES DAILY. 360 tablet 2    albuterol-ipratropium (DUO-NEB) 2.5 mg-0.5 mg/3 mL nebulizer solution Take 3 mLs by nebulization every 6 (six) hours as needed for Wheezing. Rescue 90 mL 3     No current facility-administered medications for this visit.     Review of patient's allergies indicates:   Allergen Reactions    Actemra [tocilizumab] Other (See Comments)     Severe dizziness    Codeine Nausea And Vomiting and Hives    Gold au 198 Hives and Rash    Hydroxychloroquine Other (See Comments)     Can't remember the reaction      Iodinated contrast media Other (See Comments)     Other reaction(s): BURNING ALL OVER    Iodine Other (See Comments) and Hives     Other reaction(s): BURNING ALL OVER - Iodine dye - Not topical    Ondansetron Nausea And Vomiting    Sulfa (sulfonamide antibiotics) Other (See Comments)     Can't remember the reaction    Zofran [ondansetron hcl (pf)] Nausea And Vomiting     Pt reports that last time she received zofran she started vomiting again    Methotrexate analogues Nausea Only    Pneumococcal vaccine Other (See Comments)    Pneumovax 23 [pneumococcal 23-argenis ps vaccine] Other (See Comments)     "sick"    Methotrexate Nausea Only     Social History     Socioeconomic History    Marital status:    Tobacco Use    Smoking status: Never    Smokeless tobacco: Never   Substance and Sexual Activity    Alcohol use: Yes     Comment: 2-3 times per year.    Drug use: No    Sexual activity: Yes     Partners: Male     Birth control/protection: Surgical     Social Determinants of Health     Financial Resource Strain: Unknown (12/5/2023) "    Overall Financial Resource Strain (CARDIA)     Difficulty of Paying Living Expenses: Patient refused   Food Insecurity: Unknown (12/5/2023)    Hunger Vital Sign     Worried About Running Out of Food in the Last Year: Patient refused     Ran Out of Food in the Last Year: Patient refused   Transportation Needs: Unknown (12/5/2023)    PRAPARE - Transportation     Lack of Transportation (Medical): Patient refused     Lack of Transportation (Non-Medical): Patient refused   Physical Activity: Unknown (12/5/2023)    Exercise Vital Sign     Days of Exercise per Week: Patient refused     Minutes of Exercise per Session: 0 min   Recent Concern: Physical Activity - Inactive (9/6/2023)    Exercise Vital Sign     Days of Exercise per Week: 0 days     Minutes of Exercise per Session: 0 min   Stress: Unknown (12/5/2023)    [employer] - Occupational Stress Questionnaire     Feeling of Stress : Patient refused   Social Connections: Unknown (12/5/2023)    Social Connection and Isolation Panel [NHANES]     Frequency of Communication with Friends and Family: Patient refused     Frequency of Social Gatherings with Friends and Family: Patient refused     Attends Cheondoism Services: More than 4 times per year     Active Member of Clubs or Organizations: Patient refused     Attends Club or Organization Meetings: Patient refused     Marital Status: Patient refused   Housing Stability: Unknown (12/5/2023)    Housing Stability Vital Sign     Unable to Pay for Housing in the Last Year: Patient refused     Number of Places Lived in the Last Year: 1     Unstable Housing in the Last Year: No       ROS    REVIEW OF SYSTEMS: Negative as documented below as well as positive findings in bold.       Constitutional  Respiratory  Gastrointestinal  Skin   - Fever - Cough - Heartburn - Rash   - Chills - Spit blood - Nausea - Itching   - Weight Loss - Shortness of breath - Vomiting - Nail pain   - Malaise/Fatigue - Wheezing  "- Abdominal Pain  Wound/Ulcer   - Weight Gain   - Blood in Stool  Poor wound healing       - Diarrhea          Cardiovascular  Genitourinary  Neurological  HEENT   - Chest Pain - Dysuria - Burning Sensation of feet - Headache   - Palpitations - Hematuria - Tingling / Paresthesia - Congestion   - Pain at night in legs - Flank Pain - Dizziness - Sore Throat   - Cramping   - Tremor - Blurred Vision   - Leg Swelling   - Sensory Change - Double Vision   - Dizzy when standing   - Speech Change - Eye Redness       - Focal Weakness - Dry Eyes       - Loss of Consciousness          Endocrine  Musculoskeletal  Psychiatric   - Cold intolerance - Muscle Pain - Depression   - Heat intolerance - Neck Pain - Insomnia   - Anemia - Joint Pain - Memory Loss   -  Easy bruising, bleeding - Heel pain - Anxiety      Toe Pain        Leg/Ankle/Foot Pain         Objective:     Ht 5' 1" (1.549 m)   Wt 61.7 kg (136 lb 0.4 oz)   LMP  (LMP Unknown)   BMI 25.70 kg/m²   Vitals:    12/12/23 1518   Weight: 61.7 kg (136 lb 0.4 oz)   Height: 5' 1" (1.549 m)   PainSc: 0-No pain         Physical Exam    General Appearance:   Patient appears well developed, well nourished  Patient appears stated age    Psychiatric:   Patient is oriented to time, place, and person.  Patient has appropriate mood and affect    Neck:  Trachea Midline  No visible masses    Respiratory/Ears:  No distress or labored breathing.  Able to differentiate between normal talking voice and whisper.  Able to follow commands    Eyes:  Visual Acuity intact  Lids and conjunctivae normal. No discoloration noted.    Physical Exam  Ortho Exam  Ortho/SPM Exam  Foot Exam  Physical Exam  Neurologic Exam    R LE exam con't:  V:  DP 1/4, PT 2/4   CRT< 3s to all digits tested   Tibial and popliteal lymph nodes are w/o abnormality    No edema, + VV    N:  Patient displays normal ankle reflexes   SILT in SP/DP/T/Opal/Saph distributions    Ortho: +Motor EHL/FHL/TA/GA   +TTP R 3rd toe   Compartments " soft/compressible. No pain on passive stretch of big toe. No calf  Pain.   hammertoe deformity present 3rd toe    Derm:  skin not intact, ulceration present,  ulcer probes to subQ    Ulcer Location: 3rd toe medial  Measurements (post-debridement): 0.3x0.3x0.5cm  Periwound: hyperkeratotic  Drainage: none  Malodor: none  Base:  fibrotic  Signs of infection: none    Imaging / Labs:    Hemoglobin A1C   Date Value Ref Range Status   03/15/2022 5.2 4.0 - 5.6 % Final     Comment:     ADA Screening Guidelines:  5.7-6.4%  Consistent with prediabetes  >or=6.5%  Consistent with diabetes    High levels of fetal hemoglobin interfere with the HbA1C  assay. Heterozygous hemoglobin variants (HbS, HgC, etc)do  not significantly interfere with this assay.   However, presence of multiple variants may affect accuracy.     02/06/2017 6.0 4.5 - 6.2 % Final     Comment:     According to ADA guidelines, hemoglobin A1C <7.0% represents  optimal control in non-pregnant diabetic patients.  Different  metrics may apply to specific populations.   Standards of Medical Care in Diabetes - 2016.  For the purpose of screening for the presence of diabetes:  <5.7%     Consistent with the absence of diabetes  5.7-6.4%  Consistent with increasing risk for diabetes   (prediabetes)  >or=6.5%  Consistent with diabetes  Currently no consensus exists for use of hemoglobin A1C  for diagnosis of diabetes for children.     09/28/2015 5.6 4.5 - 6.2 % Final           Note: This was dictated using a computer transcription program. Although proofread, it may contain computer transcription errors and phonetic errors. Other human proofreading errors may also exist. Corrections may be performed at a later time. Please contact us for any clarification if needed.    Isiah Mark DPM  [facility]

## 2023-12-12 NOTE — RESEARCH
Sponsor: [employer]     Study Title/IRB Number: [id number]    Principle Investigator: Dr. Aman Vasquez    Patient eligibility was checked prior to enrollment in the study. Patient met the following inclusion and exclusion criteria:     INCLUSION CRITERIA  Participant age is 50 years or older at the time of signing the Informed Consent form  Participant is capable of giving signed Informed Consent, which includes compliance with the requirements and restrictions in the informed consent form and the protocol    EXCLUSION CRITERIA  Participant is undergoing or referred for diagnostic evaluation due to clinical suspicion for cancer  Participant has a personal history of invasive or hematologic malignancy, diagnosed within the last 3 years prior to expected enrollment date or diagnosed greater than 3 years prior to expected enrollment date and never treated  Participant has had definitive treatment for invasive or hematologic malignancy within the 3 years prior to expected enrollment date  Participant is not able to comply with protocol procedures  Participant is not a current patient at a participating center  Participant is currently enrolled or was previously enrolled in another [employer]-sponsored study  Participant is current or previous employee/contractor of [employer]  Participant is currently pregnant (by participant's self-report of pregnancy status)    Prior to the Informed Consent (IC) being signed, or any study protocol required data collection, testing, procedure, or intervention being performed, the following was done and/or discussed:  Patient was given a copy of the IC for review   Purpose of the study and qualifications to participate   Study design, Follow up schedule, and tests or procedures done at each visit  Confidentiality and HIPAA Authorization for Release of Medical Records for the research trial/ subject's rights/research related injury  Risk, Benefits, Alternative Treatments, Compensation and  "Costs  Participation in the research trial is voluntary and patient may withdraw at anytime  Contact information for study related questions    Patient verbalizes understanding of the above: Yes  Contact information for CRC and PI given to patient: Yes  Patient able to adequately summarize: the purpose of the study, the risks associated with the study, and all procedures, testing, and follow-ups associated with the study: Yes    Patient signed the informed consent form for the research study with an IRB approval date of 4/25/2022. Each page of the consent form was reviewed with patient and all questions answered satisfactorily.   Patient received a copy of the consent form. The original consent was scanned into electronic medical records.    Anthropometric Data    Participant is a 63 y.o., White, Not  or /a, female  Participant height: 5'1"   Participant weight: 135 lbs      Smoking History and Alcohol use     Please indicate whether the participant smoked at least 100 cigarettes in their lifetime:   No  Please indicate whether the participant is a current smoker:  No  Age participant started smoking cigarettes: Not Applicable  Age participant stopped smoking cigarettes: Not Applicable  During their time as a smoker, please indicate if the participant stopped smoking for a month or more: Not Applicable  Please indicate how many cigarettes per day did the participant smoke on average for the majority of their time as a smoker: Blank single: Not Applicable    During the last 12 months, how often did the participant have any kind of drink containing alcohol? Count as one drink a 12 ounce can or glass of beer, a 5 ounce glass of wine, or a drink containing 1 shot of liquor. Participant has consumed alcohol previously, but not during the past 12 months}  During the last 12 months, how many drinks did the participant have on days when they drank alcohol?Not Applicable    Blood Draw    Following IC being " signed and prior to blood draw, patient completed all baseline/pretest questionnaires. The following specimens were collected from the pt at the time of this encounter via peripheral blood draw.    Blood draw location: Left Arm  Needle used: 21 gauge butterfly needle  Blood draw amount: 40ml  Blood draw time: 10:27AM 12/12/2023

## 2023-12-13 ENCOUNTER — OFFICE VISIT (OUTPATIENT)
Dept: ORTHOPEDICS | Facility: CLINIC | Age: 63
End: 2023-12-13
Payer: MEDICARE

## 2023-12-13 VITALS — HEIGHT: 61 IN | WEIGHT: 141.31 LBS | BODY MASS INDEX: 26.68 KG/M2

## 2023-12-13 DIAGNOSIS — Z98.890 S/P SPINAL SURGERY: Primary | ICD-10-CM

## 2023-12-13 DIAGNOSIS — M54.9 DORSALGIA, UNSPECIFIED: ICD-10-CM

## 2023-12-13 PROCEDURE — 3008F PR BODY MASS INDEX (BMI) DOCUMENTED: ICD-10-PCS | Mod: CPTII,S$GLB,, | Performed by: PHYSICIAN ASSISTANT

## 2023-12-13 PROCEDURE — 3008F BODY MASS INDEX DOCD: CPT | Mod: CPTII,S$GLB,, | Performed by: PHYSICIAN ASSISTANT

## 2023-12-13 PROCEDURE — 99213 OFFICE O/P EST LOW 20 MIN: CPT | Mod: S$GLB,,, | Performed by: PHYSICIAN ASSISTANT

## 2023-12-13 PROCEDURE — 1159F MED LIST DOCD IN RCRD: CPT | Mod: CPTII,S$GLB,, | Performed by: PHYSICIAN ASSISTANT

## 2023-12-13 PROCEDURE — 99999 PR PBB SHADOW E&M-EST. PATIENT-LVL IV: CPT | Mod: PBBFAC,,, | Performed by: PHYSICIAN ASSISTANT

## 2023-12-13 PROCEDURE — 99999 PR PBB SHADOW E&M-EST. PATIENT-LVL IV: ICD-10-PCS | Mod: PBBFAC,,, | Performed by: PHYSICIAN ASSISTANT

## 2023-12-13 PROCEDURE — 1159F PR MEDICATION LIST DOCUMENTED IN MEDICAL RECORD: ICD-10-PCS | Mod: CPTII,S$GLB,, | Performed by: PHYSICIAN ASSISTANT

## 2023-12-13 PROCEDURE — 99213 PR OFFICE/OUTPT VISIT, EST, LEVL III, 20-29 MIN: ICD-10-PCS | Mod: S$GLB,,, | Performed by: PHYSICIAN ASSISTANT

## 2023-12-13 NOTE — PROGRESS NOTES
Date: 12/13/2023    Supervising Physician: Jh Mosqueda M.D.    Date of Surgery: 6/22/2023    Procedure: L4/5 TLIF    History: Joyce Peterson is seen today for follow-up following the above listed procedure. Overall the patient is doing well but today notes she had an MADELINE 9/25 and 10/18.  She says this did not provide any relief. She has primarily left leg pain now.  Prior to surgery she had only right leg pain and this is improved, although still sometimes present.  She is very frustrated.        Exam:  Incision is healing well.   There is no sign of infection. Neuro exam is stable. No signs of DVT.  No pain with range of motion of the bilateral hips.     Radiographs: imaging previously shows hardware in place, no evidence of failure.     Assessment/Plan: 6 months post op.    I will get a new MRI and call with results.  She requires sedation for this.         Thank you for the opportunity to participate in this patient's care. Please give me a call if there are any concerns or questions.

## 2023-12-14 ENCOUNTER — PATIENT MESSAGE (OUTPATIENT)
Dept: FAMILY MEDICINE | Facility: CLINIC | Age: 63
End: 2023-12-14
Payer: MEDICARE

## 2023-12-14 ENCOUNTER — PATIENT MESSAGE (OUTPATIENT)
Dept: GASTROENTEROLOGY | Facility: CLINIC | Age: 63
End: 2023-12-14
Payer: MEDICARE

## 2023-12-14 DIAGNOSIS — M54.9 DORSALGIA, UNSPECIFIED: Primary | ICD-10-CM

## 2023-12-15 ENCOUNTER — OFFICE VISIT (OUTPATIENT)
Dept: FAMILY MEDICINE | Facility: CLINIC | Age: 63
End: 2023-12-15
Payer: MEDICARE

## 2023-12-15 VITALS
WEIGHT: 139.13 LBS | SYSTOLIC BLOOD PRESSURE: 96 MMHG | OXYGEN SATURATION: 95 % | DIASTOLIC BLOOD PRESSURE: 64 MMHG | HEART RATE: 105 BPM | TEMPERATURE: 98 F | HEIGHT: 61 IN | BODY MASS INDEX: 26.27 KG/M2

## 2023-12-15 DIAGNOSIS — J32.9 SINUSITIS, UNSPECIFIED CHRONICITY, UNSPECIFIED LOCATION: Primary | ICD-10-CM

## 2023-12-15 DIAGNOSIS — J45.20 MILD INTERMITTENT ASTHMA WITHOUT COMPLICATION: ICD-10-CM

## 2023-12-15 PROCEDURE — 3074F PR MOST RECENT SYSTOLIC BLOOD PRESSURE < 130 MM HG: ICD-10-PCS | Mod: CPTII,S$GLB,, | Performed by: PEDIATRICS

## 2023-12-15 PROCEDURE — 1160F RVW MEDS BY RX/DR IN RCRD: CPT | Mod: CPTII,S$GLB,, | Performed by: PEDIATRICS

## 2023-12-15 PROCEDURE — 3008F BODY MASS INDEX DOCD: CPT | Mod: CPTII,S$GLB,, | Performed by: PEDIATRICS

## 2023-12-15 PROCEDURE — 99214 PR OFFICE/OUTPT VISIT, EST, LEVL IV, 30-39 MIN: ICD-10-PCS | Mod: S$GLB,,, | Performed by: PEDIATRICS

## 2023-12-15 PROCEDURE — 3008F PR BODY MASS INDEX (BMI) DOCUMENTED: ICD-10-PCS | Mod: CPTII,S$GLB,, | Performed by: PEDIATRICS

## 2023-12-15 PROCEDURE — 1159F PR MEDICATION LIST DOCUMENTED IN MEDICAL RECORD: ICD-10-PCS | Mod: CPTII,S$GLB,, | Performed by: PEDIATRICS

## 2023-12-15 PROCEDURE — 1160F PR REVIEW ALL MEDS BY PRESCRIBER/CLIN PHARMACIST DOCUMENTED: ICD-10-PCS | Mod: CPTII,S$GLB,, | Performed by: PEDIATRICS

## 2023-12-15 PROCEDURE — 99999 PR PBB SHADOW E&M-EST. PATIENT-LVL V: CPT | Mod: PBBFAC,,, | Performed by: PEDIATRICS

## 2023-12-15 PROCEDURE — 1159F MED LIST DOCD IN RCRD: CPT | Mod: CPTII,S$GLB,, | Performed by: PEDIATRICS

## 2023-12-15 PROCEDURE — 99214 OFFICE O/P EST MOD 30 MIN: CPT | Mod: S$GLB,,, | Performed by: PEDIATRICS

## 2023-12-15 PROCEDURE — 3074F SYST BP LT 130 MM HG: CPT | Mod: CPTII,S$GLB,, | Performed by: PEDIATRICS

## 2023-12-15 PROCEDURE — 99999 PR PBB SHADOW E&M-EST. PATIENT-LVL V: ICD-10-PCS | Mod: PBBFAC,,, | Performed by: PEDIATRICS

## 2023-12-15 PROCEDURE — 3078F DIAST BP <80 MM HG: CPT | Mod: CPTII,S$GLB,, | Performed by: PEDIATRICS

## 2023-12-15 PROCEDURE — 3078F PR MOST RECENT DIASTOLIC BLOOD PRESSURE < 80 MM HG: ICD-10-PCS | Mod: CPTII,S$GLB,, | Performed by: PEDIATRICS

## 2023-12-15 RX ORDER — DOXYCYCLINE HYCLATE 100 MG
100 TABLET ORAL 2 TIMES DAILY
Qty: 20 TABLET | Refills: 0 | Status: SHIPPED | OUTPATIENT
Start: 2023-12-15 | End: 2023-12-25

## 2023-12-15 RX ORDER — BENZONATATE 100 MG/1
100 CAPSULE ORAL 3 TIMES DAILY PRN
Qty: 30 CAPSULE | Refills: 1 | Status: SHIPPED | OUTPATIENT
Start: 2023-12-15 | End: 2024-01-04

## 2023-12-15 RX ORDER — ALBUTEROL SULFATE 0.83 MG/ML
2.5 SOLUTION RESPIRATORY (INHALATION) EVERY 6 HOURS PRN
Qty: 75 ML | Refills: 0 | Status: SHIPPED | OUTPATIENT
Start: 2023-12-15 | End: 2024-12-14

## 2023-12-15 RX ORDER — PROMETHAZINE HYDROCHLORIDE AND DEXTROMETHORPHAN HYDROBROMIDE 6.25; 15 MG/5ML; MG/5ML
5 SYRUP ORAL EVERY 4 HOURS PRN
Qty: 180 ML | Refills: 0 | Status: SHIPPED | OUTPATIENT
Start: 2023-12-15 | End: 2023-12-25

## 2023-12-15 NOTE — PROGRESS NOTES
Subjective:      Patient ID: Joyce Peterson is a 63 y.o. female.    Chief Complaint: URI (Blowing green snot, sob, stuffy nose, coughing, denies fever, chills, body aches. Started two weeks ago. )    Reports sinus congestion, sore throat, short of breath for 2 weeks. Has been using symbicort daily and taking singulair but is out of albuterol. Has been using antihistimines, nasal sprays, mucinex.     URI   Associated symptoms include congestion, coughing, headaches, rhinorrhea, sinus pain and a sore throat. Pertinent negatives include no chest pain, diarrhea, ear pain, nausea, rash, sneezing, vomiting or wheezing.     Review of Systems   Constitutional:  Positive for fatigue. Negative for chills and fever.   HENT:  Positive for congestion, rhinorrhea, sinus pressure, sinus pain, sore throat and voice change. Negative for ear pain, postnasal drip and sneezing.    Respiratory:  Positive for cough and shortness of breath. Negative for chest tightness and wheezing.    Cardiovascular:  Negative for chest pain and palpitations.   Gastrointestinal:  Negative for diarrhea, nausea and vomiting.   Genitourinary:  Negative for difficulty urinating.   Musculoskeletal:  Negative for arthralgias.   Skin:  Negative for rash.   Neurological:  Positive for headaches. Negative for dizziness.   Psychiatric/Behavioral:  Negative for confusion.         Current Outpatient Medications on File Prior to Visit   Medication Sig    albuterol (PROVENTIL/VENTOLIN HFA) 90 mcg/actuation inhaler Inhale 2 puffs into the lungs every 6 (six) hours as needed. Rescue    azelastine (ASTELIN) 137 mcg (0.1 %) nasal spray Use 1 spray (137 mcg total) in each nostril 2 (two) times daily.    budesonide-formoterol 160-4.5 mcg (SYMBICORT) 160-4.5 mcg/actuation HFAA Inhale 2 puffs into the lungs every 12 (twelve) hours.    calcium citrate-vitamin D3 315-200 mg (CITRACAL+D) 315 mg-5 mcg (200 unit) per tablet Take 1 tablet by mouth 2 (two) times daily.      cycloSPORINE (RESTASIS) 0.05 % ophthalmic emulsion PLACE 1 DROP INTO BOTH EYES TWICE A DAY    diclofenac sodium (VOLTAREN) 1 % Gel APPLY 2 GRAMS TOPICALLY 4 (FOUR) TIMES DAILY AS NEEDED.    fluconazole (DIFLUCAN) 150 MG Tab Take 150 mg by mouth as needed.    fluticasone propionate (FLONASE) 50 mcg/actuation nasal spray Use 1 spray (50 mcg total) in each nostril once daily.    INV PROPULSID 10 MG Take 2 tablets (20 mg) by mouth 4 (four) times daily. FOR INVESTIGATIONAL USE ONLY. Protocol [id number] Subject ID: [id number].    leflunomide (ARAVA) 20 MG Tab Take 1 tablet (20 mg total) by mouth once daily.    lifitegrast (XIIDRA) 5 % Dpet INSTILL ONE DROP INTO BOTH EYES TWICE A DAY    methenamine (HIPREX) 1 gram Tab Take 1 tablet (1 g total) by mouth 2 (two) times daily.    methocarbamoL (ROBAXIN) 750 MG Tab Take 1 tablet (750 mg total) by mouth 3 (three) times daily as needed.    mirabegron (MYRBETRIQ) 25 mg Tb24 ER tablet Take 1 tablet (25 mg total) by mouth once daily.    montelukast (SINGULAIR) 10 mg tablet Take 1 tablet (10 mg total) by mouth every evening.    naproxen (NAPROSYN) 500 MG tablet TAKE 1 TABLET (500 MG TOTAL) BY MOUTH 2 (TWO) TIMES DAILY AS NEEDED    omeprazole (PRILOSEC) 40 MG capsule Take 1 capsule (40 mg total) by mouth every morning.    omeprazole-sodium bicarbonate (ZEGERID) 40-1.1 mg-gram per capsule TAKE 1 CAPSULE BY MOUTH EVERY DAY IN THE MORNING    oxyCODONE-acetaminophen (PERCOCET) 5-325 mg per tablet Take 1 tablet by mouth every 6 (six) hours as needed for Pain.    POTASSIUM ORAL Take by mouth once daily.    predniSONE (DELTASONE) 5 MG tablet TAKE 1 TABLET BY MOUTH EVERY DAY    pregabalin (LYRICA) 150 MG capsule Take 1 capsule (150 mg total) by mouth 3 (three) times daily.    promethazine (PHENERGAN) 25 MG tablet Take 1 tablet (25 mg total) by mouth every 6 (six) hours as needed for Nausea.    rosuvastatin (CRESTOR) 20 MG tablet Take 1 tablet by mouth every day in the evening    sarilumab  (KEVZARA) 200 mg/1.14 mL Syrg Inject 1.14 mL (200 mg total) into the skin every 14 (fourteen) days.    sucralfate (CARAFATE) 1 gram tablet TAKE 1 TABLET (1 G TOTAL) BY MOUTH 4 (FOUR) TIMES DAILY.    albuterol-ipratropium (DUO-NEB) 2.5 mg-0.5 mg/3 mL nebulizer solution Take 3 mLs by nebulization every 6 (six) hours as needed for Wheezing. Rescue     No current facility-administered medications on file prior to visit.        Objective:     Vitals:    12/15/23 1344   BP: 96/64   Pulse: 105   Temp: 97.9 °F (36.6 °C)      Physical Exam  Constitutional:       General: She is not in acute distress.     Appearance: Normal appearance.   HENT:      Head: Normocephalic and atraumatic.      Right Ear: Tympanic membrane, ear canal and external ear normal.      Left Ear: Tympanic membrane, ear canal and external ear normal.      Nose: Mucosal edema and rhinorrhea present.      Mouth/Throat:      Mouth: Mucous membranes are moist.      Pharynx: Oropharynx is clear. Posterior oropharyngeal erythema present.   Eyes:      Extraocular Movements: Extraocular movements intact.      Conjunctiva/sclera: Conjunctivae normal.      Pupils: Pupils are equal, round, and reactive to light.   Cardiovascular:      Rate and Rhythm: Normal rate and regular rhythm.      Pulses: Normal pulses.      Heart sounds: Normal heart sounds.   Pulmonary:      Effort: Pulmonary effort is normal. No respiratory distress.      Breath sounds: Examination of the left-upper field reveals wheezing. Wheezing present.   Abdominal:      General: Abdomen is flat. Bowel sounds are normal.   Musculoskeletal:         General: No swelling. Normal range of motion.      Cervical back: Normal range of motion and neck supple.   Skin:     General: Skin is warm and dry.      Findings: No rash.   Neurological:      Mental Status: She is alert and oriented to person, place, and time.      Gait: Gait normal.   Psychiatric:         Mood and Affect: Mood normal.         Behavior:  Behavior normal.        Assessment:         1. Sinusitis, unspecified chronicity, unspecified location    2. Mild intermittent asthma without complication          Plan:   1. Sinusitis, unspecified chronicity, unspecified location  - doxycycline (VIBRA-TABS) 100 MG tablet; Take 1 tablet (100 mg total) by mouth 2 (two) times daily. for 10 days  Dispense: 20 tablet; Refill: 0  - promethazine-dextromethorphan (PROMETHAZINE-DM) 6.25-15 mg/5 mL Syrp; Take 5 mLs by mouth every 4 (four) hours as needed (cough).  Dispense: 180 mL; Refill: 0  - albuterol (PROVENTIL) 2.5 mg /3 mL (0.083 %) nebulizer solution; Take 3 mLs (2.5 mg total) by nebulization every 6 (six) hours as needed for Wheezing. Rescue  Dispense: 3 mL; Refill: 0  - benzonatate (TESSALON) 100 MG capsule; Take 1 capsule (100 mg total) by mouth 3 (three) times daily as needed for Cough.  Dispense: 30 capsule; Refill: 1    2. Mild intermittent asthma without complication  - doxycycline (VIBRA-TABS) 100 MG tablet; Take 1 tablet (100 mg total) by mouth 2 (two) times daily. for 10 days  Dispense: 20 tablet; Refill: 0  - promethazine-dextromethorphan (PROMETHAZINE-DM) 6.25-15 mg/5 mL Syrp; Take 5 mLs by mouth every 4 (four) hours as needed (cough).  Dispense: 180 mL; Refill: 0  - albuterol (PROVENTIL) 2.5 mg /3 mL (0.083 %) nebulizer solution; Take 3 mLs (2.5 mg total) by nebulization every 6 (six) hours as needed for Wheezing. Rescue  Dispense: 3 mL; Refill: 0       Use saline rinse as needed.  Mist humidifier recommended.  Warm compresses to face and neck several times daily as needed.  Begin medication regimen as discussed:  Mucinex D AM 12hr  Flonase BID  Zyrtec AM  Use Ibuprofen as needed for pain.  Start antibiotic as instructed, complete entire regimen.  Follow up as needed.          Justin Wesley NP   [facility]  12/15/23

## 2023-12-19 ENCOUNTER — PATIENT MESSAGE (OUTPATIENT)
Dept: ORTHOPEDICS | Facility: CLINIC | Age: 63
End: 2023-12-19
Payer: MEDICARE

## 2023-12-19 ENCOUNTER — PATIENT MESSAGE (OUTPATIENT)
Dept: PAIN MEDICINE | Facility: CLINIC | Age: 63
End: 2023-12-19
Payer: MEDICARE

## 2023-12-19 DIAGNOSIS — K31.84 GASTROPARESIS: Primary | ICD-10-CM

## 2023-12-19 DIAGNOSIS — Z98.890 S/P SPINAL SURGERY: Primary | ICD-10-CM

## 2023-12-20 ENCOUNTER — PATIENT MESSAGE (OUTPATIENT)
Dept: GASTROENTEROLOGY | Facility: CLINIC | Age: 63
End: 2023-12-20
Payer: MEDICARE

## 2023-12-20 ENCOUNTER — HOSPITAL ENCOUNTER (OUTPATIENT)
Dept: RADIOLOGY | Facility: CLINIC | Age: 63
Discharge: HOME | End: 2023-12-20
Attending: STUDENT IN AN ORGANIZED HEALTH CARE EDUCATION/TRAINING PROGRAM
Payer: MEDICARE

## 2023-12-20 DIAGNOSIS — T38.0X5A STEROID-INDUCED OSTEOPOROSIS: Chronic | ICD-10-CM

## 2023-12-20 DIAGNOSIS — M81.8 STEROID-INDUCED OSTEOPOROSIS: Chronic | ICD-10-CM

## 2023-12-20 PROCEDURE — 77080 DEXA BONE DENSITY SPINE HIP: ICD-10-PCS | Mod: 26,,, | Performed by: INTERNAL MEDICINE

## 2023-12-20 PROCEDURE — 77080 DXA BONE DENSITY AXIAL: CPT | Mod: TC

## 2023-12-20 PROCEDURE — 77080 DXA BONE DENSITY AXIAL: CPT | Mod: 26,,, | Performed by: INTERNAL MEDICINE

## 2023-12-20 RX ORDER — OXYCODONE AND ACETAMINOPHEN 5; 325 MG/1; MG/1
1 TABLET ORAL EVERY 6 HOURS PRN
Qty: 120 TABLET | Refills: 0 | Status: SHIPPED | OUTPATIENT
Start: 2023-12-28 | End: 2023-12-26 | Stop reason: SDUPTHER

## 2023-12-20 NOTE — TELEPHONE ENCOUNTER
Patient requesting refill on   Oxycodone-Acetaminophen 5-325     Last office visit 12/11/23   shows last refill on 11/28/23  Patient does not have a pain contract on file with [facility] department  Patient last UDS none on file was not consistent with current therapy      Patient would like a refill on metformin. Last seen on March 16, 2017 for a medication check. Last A1C was on March 14, 2017 with a value of 6.5.

## 2023-12-21 ENCOUNTER — PATIENT MESSAGE (OUTPATIENT)
Dept: RHEUMATOLOGY | Facility: CLINIC | Age: 63
End: 2023-12-21
Payer: MEDICARE

## 2023-12-22 ENCOUNTER — PATIENT MESSAGE (OUTPATIENT)
Dept: ENDOCRINOLOGY | Facility: CLINIC | Age: 63
End: 2023-12-22
Payer: MEDICARE

## 2023-12-26 ENCOUNTER — OFFICE VISIT (OUTPATIENT)
Dept: PAIN MEDICINE | Facility: CLINIC | Age: 63
End: 2023-12-26
Attending: ANESTHESIOLOGY
Payer: MEDICARE

## 2023-12-26 VITALS
RESPIRATION RATE: 18 BRPM | TEMPERATURE: 98 F | HEART RATE: 91 BPM | WEIGHT: 136 LBS | DIASTOLIC BLOOD PRESSURE: 74 MMHG | SYSTOLIC BLOOD PRESSURE: 125 MMHG | OXYGEN SATURATION: 98 % | BODY MASS INDEX: 25.7 KG/M2

## 2023-12-26 DIAGNOSIS — M25.552 LEFT HIP PAIN: Primary | ICD-10-CM

## 2023-12-26 DIAGNOSIS — G89.29 CHRONIC HIP PAIN, LEFT: Primary | ICD-10-CM

## 2023-12-26 DIAGNOSIS — Z98.890 S/P SPINAL SURGERY: ICD-10-CM

## 2023-12-26 DIAGNOSIS — M25.552 CHRONIC HIP PAIN, LEFT: Primary | ICD-10-CM

## 2023-12-26 DIAGNOSIS — M25.552 LEFT HIP PAIN: ICD-10-CM

## 2023-12-26 PROCEDURE — 1160F PR REVIEW ALL MEDS BY PRESCRIBER/CLIN PHARMACIST DOCUMENTED: ICD-10-PCS | Mod: CPTII,S$GLB,, | Performed by: ANESTHESIOLOGY

## 2023-12-26 PROCEDURE — 99999 PR PBB SHADOW E&M-EST. PATIENT-LVL V: ICD-10-PCS | Mod: PBBFAC,,, | Performed by: ANESTHESIOLOGY

## 2023-12-26 PROCEDURE — 3008F BODY MASS INDEX DOCD: CPT | Mod: CPTII,S$GLB,, | Performed by: ANESTHESIOLOGY

## 2023-12-26 PROCEDURE — 1160F RVW MEDS BY RX/DR IN RCRD: CPT | Mod: CPTII,S$GLB,, | Performed by: ANESTHESIOLOGY

## 2023-12-26 PROCEDURE — 1159F MED LIST DOCD IN RCRD: CPT | Mod: CPTII,S$GLB,, | Performed by: ANESTHESIOLOGY

## 2023-12-26 PROCEDURE — 99214 PR OFFICE/OUTPT VISIT, EST, LEVL IV, 30-39 MIN: ICD-10-PCS | Mod: S$GLB,,, | Performed by: ANESTHESIOLOGY

## 2023-12-26 PROCEDURE — 3008F PR BODY MASS INDEX (BMI) DOCUMENTED: ICD-10-PCS | Mod: CPTII,S$GLB,, | Performed by: ANESTHESIOLOGY

## 2023-12-26 PROCEDURE — 1159F PR MEDICATION LIST DOCUMENTED IN MEDICAL RECORD: ICD-10-PCS | Mod: CPTII,S$GLB,, | Performed by: ANESTHESIOLOGY

## 2023-12-26 PROCEDURE — 3074F SYST BP LT 130 MM HG: CPT | Mod: CPTII,S$GLB,, | Performed by: ANESTHESIOLOGY

## 2023-12-26 PROCEDURE — 3078F PR MOST RECENT DIASTOLIC BLOOD PRESSURE < 80 MM HG: ICD-10-PCS | Mod: CPTII,S$GLB,, | Performed by: ANESTHESIOLOGY

## 2023-12-26 PROCEDURE — 3078F DIAST BP <80 MM HG: CPT | Mod: CPTII,S$GLB,, | Performed by: ANESTHESIOLOGY

## 2023-12-26 PROCEDURE — 99999 PR PBB SHADOW E&M-EST. PATIENT-LVL V: CPT | Mod: PBBFAC,,, | Performed by: ANESTHESIOLOGY

## 2023-12-26 PROCEDURE — 3074F PR MOST RECENT SYSTOLIC BLOOD PRESSURE < 130 MM HG: ICD-10-PCS | Mod: CPTII,S$GLB,, | Performed by: ANESTHESIOLOGY

## 2023-12-26 PROCEDURE — 99214 OFFICE O/P EST MOD 30 MIN: CPT | Mod: S$GLB,,, | Performed by: ANESTHESIOLOGY

## 2023-12-26 RX ORDER — OXYCODONE AND ACETAMINOPHEN 5; 325 MG/1; MG/1
1 TABLET ORAL EVERY 6 HOURS PRN
Qty: 120 TABLET | Refills: 0 | Status: SHIPPED | OUTPATIENT
Start: 2023-12-28 | End: 2024-01-31 | Stop reason: SDUPTHER

## 2023-12-26 RX ORDER — METHOCARBAMOL 750 MG/1
750 TABLET, FILM COATED ORAL 3 TIMES DAILY PRN
Qty: 90 TABLET | Refills: 1 | Status: SHIPPED | OUTPATIENT
Start: 2023-12-26

## 2023-12-26 NOTE — PROGRESS NOTES
Subjective:      Patient ID: Joyce Peterson is a 63 y.o. female.    Chief Complaint: Hip Pain and Low-back Pain    Referred by: No ref. provider found     HPI  Today 12/26/2023   She is here for follow-up for left hip pain.  She said the radiofrequency ablation she had in July of 2022 helped her tremendously.  Three months ago her pain started again on the lateral aspect of the left hip.  She is status post ORIF on the left hip.  She has heterotopic ossification.  She would like to repeat the procedure.  She said she will have to put off the spinal cord stimulator until an MRI is executed ordered by another provider.  No new symptomatology otherwise.    Interval history  She returns for virtual follow up of back and hip pain. At her last OV, Dr. Mayfield recommended [place] SCS trial. She had her first part of the psych consult and has the second part this afternoon. She has had limited benefit with multiple interventions in the past including back surgery, procedures, PT and medications. She would like to move forward with SCS trial. Her pain today is 10/10.    Past Medical History:   Diagnosis Date    Acid reflux     Allergy     Anemia     Asthma     Coronary artery disease     CS (cervical spondylosis) 03/08/2013    Degenerative disc disease     Degenerative joint disease of hindfoot, left 6/28/2022    Dry eyes     Dry mouth     Gastroparesis     History of methotrexate therapy 01/19/2022    Hyperlipidemia     Lateral meniscus derangement 04/06/2016    Lobular carcinoma in situ     Lumbar spondylosis 03/08/2013    Osteoarthritis     Osteoporosis     Pes planovalgus, acquired, left 6/28/2022    Rheumatoid arthritis(714.0)     Rupture of left triceps tendon 10/17/2018    Umbilical hernia 08/13/2015       Past Surgical History:   Procedure Laterality Date    BREAST BIOPSY Left 01/29/2002    core bx    CARPAL TUNNEL RELEASE Right 05/2017    CHOLECYSTECTOMY  2004    COLONOSCOPY      10/11    COLONOSCOPY N/A  6/29/2017    Procedure: COLONOSCOPY;  Surgeon: López Moore MD;  Location: [facility] ENDO (4TH FLR);  Service: Endoscopy;  Laterality: N/A;    EPIDURAL STEROID INJECTION N/A 11/18/2021    Procedure: INJECTION, STEROID, EPIDURAL, L5-S1 IL NEED CONSENT;  Surgeon: Tj Mayfield MD;  Location: [facility] PAIN MGT;  Service: Pain Management;  Laterality: N/A;    EPIDURAL STEROID INJECTION N/A 9/29/2022    Procedure: LUMBAR L3/L4 IL MADELINE CONTRAST;  Surgeon: Tj Mayfield MD;  Location: [facility] PAIN MGT;  Service: Pain Management;  Laterality: N/A;    EPIDURAL STEROID INJECTION N/A 12/12/2022    Procedure: INJECTION, STEROID, EPIDURAL L5/S1 IL CONTRAST;  Surgeon: Tj Mayfield MD;  Location: [facility] PAIN MGT;  Service: Pain Management;  Laterality: N/A;    EPIDURAL STEROID INJECTION N/A 4/6/2023    Procedure: INJECTION, STEROID, EPIDURAL L5/S1;  Surgeon: Tj Mayfield MD;  Location: [facility] PAIN MGT;  Service: Pain Management;  Laterality: N/A;    FOOT ARTHRODESIS Left 1/11/2023    Procedure: FUSION, FOOT;  Surgeon: Ariella Finnegan MD;  Location: [facility] OR;  Service: Orthopedics;  Laterality: Left;  [facility]    FUSION, SPINE, LUMBAR, TLIF, POSTERIOR APPROACH, USING PEDICLE SCREW N/A 6/22/2023    Procedure: FUSION, SPINE, LUMBAR, TLIF, POSTERIOR APPROACH, USING PEDICLE SCREW L4/5 spinewave SNS:SSEP/EMG;  Surgeon: Jh Mosqueda MD;  Location: [facility] OR 2ND FLR;  Service: Neurosurgery;  Laterality: N/A;    HARVESTING OF BONE GRAFT Left 1/11/2023    Procedure: SURGICAL PROCUREMENT, BONE GRAFT;  Surgeon: Ariella Finnegan MD;  Location: [facility] OR;  Service: Orthopedics;  Laterality: Left;    INJECTION OF ANESTHETIC AGENT AROUND NERVE Bilateral 8/23/2021    Procedure: BLOCK, NERVE, MEDIAL BRANCH L3,L4,L5;  Surgeon: Tj Mayfield MD;  Location: [facility] PAIN MGT;  Service: Pain Management;  Laterality: Bilateral;  1 of 2    INJECTION OF ANESTHETIC AGENT AROUND NERVE Bilateral 8/26/2021    Procedure: BLOCK, NERVE, MEDIAL BRANCH L3,L4,L5;  Surgeon: Tj Mayfield MD;   Location: [facility] PAIN MGT;  Service: Pain Management;  Laterality: Bilateral;  2 of 2    INJECTION OF ANESTHETIC AGENT AROUND NERVE Left 7/7/2022    Procedure: BLOCK, NERVE, LEFT OBTURATOR AND FEMORAL;  Surgeon: Tj Mayfield MD;  Location: [facility] PAIN MGT;  Service: Pain Management;  Laterality: Left;    INJECTION OF FACET JOINT Bilateral 3/12/2020    Procedure: FACET JOINT INJECTION (LUMBAR BLOCK) BILATERAL L4-5 AND L5-S1 DIRECT REFERRAL;  Surgeon: Tj Mayfield MD;  Location: [facility] PAIN MGT;  Service: Pain Management;  Laterality: Bilateral;  NEEDS CONSENT    INJECTION OF FACET JOINT Bilateral 3/29/2021    Procedure: INJECTION, FACET JOINT L3/4, L4/5, L5/S1;  Surgeon: Tj Mayfield MD;  Location: [facility] PAIN MGT;  Service: Pain Management;  Laterality: Bilateral;    INJECTION OF JOINT Right 7/30/2020    Procedure: INJECTION, JOINT, RIGHT HIP Interarticular under flouro;  Surgeon: Tj Mayfield MD;  Location: [facility] PAIN MGT;  Service: Pain Management;  Laterality: Right;  INJECTION, JOINT, RIGHT HIP Interarticular under flouro    INJECTION OF JOINT Right 2/21/2022    Procedure: Injection, Joint RIGHT ISCHIAL BURSA;  Surgeon: Tj Mayfield MD;  Location: [facility] PAIN MGT;  Service: Pain Management;  Laterality: Right;    INTRAMEDULLARY RODDING OF FEMUR Left 5/21/2019    Procedure: INSERTION, INTRAMEDULLARY ARIEL, FEMUR;  Surgeon: López Duff MD;  Location: [address];  Service: Orthopedics;  Laterality: Left;    LENGTHENING OF TENDON OF LOWER EXTREMITY Left 1/11/2023    Procedure: LENGTHENING, TENDON, LOWER EXTREMITY;  Surgeon: Ariella Finnegan MD;  Location: [facility];  Service: Orthopedics;  Laterality: Left;    MAGNETIC RESONANCE IMAGING N/A 5/29/2023    Procedure: MRI (Magnetic Resonance Imagine);  Surgeon: Sujey Surgeon;  Location: [facility];  Service: Anesthesiology;  Laterality: N/A;    RADIOFREQUENCY ABLATION Left 9/20/2021    Procedure: RADIOFREQUENCY ABLATION left L3,4,5 RFA  1 of 2  CONSENT NEEDED;  Surgeon: Tj  MD Tran;  Location: [facility] PAIN MGT;  Service: Pain Management;  Laterality: Left;    RADIOFREQUENCY ABLATION Right 10/4/2021    Procedure: RADIOFREQUENCY ABLATION  right L3,4,5 RFA   2 of 2  CONSENT NEEDED;  Surgeon: Tj Mayfield MD;  Location: [facility] PAIN MGT;  Service: Pain Management;  Laterality: Right;    RADIOFREQUENCY ABLATION Left 4/21/2022    Procedure: Radiofrequency Ablation LEFT L3,L4,L5;  Surgeon: Tj Mayfield MD;  Location: [facility] PAIN MGT;  Service: Pain Management;  Laterality: Left;    RADIOFREQUENCY ABLATION Right 5/12/2022    Procedure: Radiofrequency Ablation RIGHT L3,L4,L5;  Surgeon: Tj Mayfield MD;  Location: [facility] PAIN MGT;  Service: Pain Management;  Laterality: Right;    RADIOFREQUENCY ABLATION Left 7/25/2022    Procedure: Radiofrequency Ablation Left Femoral & Obturator;  Surgeon: Tj Mayfield MD;  Location: [facility] PAIN MGT;  Service: Pain Management;  Laterality: Left;    REPAIR OF TRICEPS TENDON Left 10/17/2018    Procedure: REPAIR, TENDON, TRICEPS left elbow;  Surgeon: Staci Yarbrough MD;  Location: [facility] OR [address] [place] [address];  Service: Orthopedics;  Laterality: Left;  Anesthesia: General and regional. PRONE, k-wire , hand pan 1 and pan 2, CALL ARTHREX/Tamera notified 10-12 LO    TONSILLECTOMY      TRANSFORAMINAL EPIDURAL INJECTION OF STEROID Right 8/31/2020    Procedure: INJECTION, STEROID, EPIDURAL, TRANSFORAMINAL,  APPROACH, L3-L4 and L4-L5 need consent;  Surgeon: Tj Mayfield MD;  Location: [facility] PAIN MGT;  Service: Pain Management;  Laterality: Right;    TRANSFORAMINAL EPIDURAL INJECTION OF STEROID Bilateral 7/23/2021    Procedure: INJECTION, STEROID, EPIDURAL, TRANSFORAMINAL APPROACH L4/5;  Surgeon: Tj Mayfield MD;  Location: [facility] PAIN MGT;  Service: Pain Management;  Laterality: Bilateral;    TRANSFORAMINAL EPIDURAL INJECTION OF STEROID Bilateral 9/25/2023    Procedure: LUMBAR TRANSFORAMINAL BILATERAL  L2/3 DIRECT REFERRAL;  Surgeon: Tj Mayfield MD;  Location: [facility] PAIN MGT;   "Service: Pain Management;  Laterality: Bilateral;    TRANSFORAMINAL EPIDURAL INJECTION OF STEROID Left 10/18/2023    Procedure: LUMBAR TRANSFORAMINAL LEFT L3/4 AND L4/5 * CLEARANCE IN CHART*;  Surgeon: Tj Mayfield MD;  Location: [facility] PAIN MGT;  Service: Pain Management;  Laterality: Left;    TRIGGER POINT INJECTION N/A 7/23/2021    Procedure: INJECTION, TRIGGER POINT SCAPULAR;  Surgeon: Tj Mayfield MD;  Location: [facility] PAIN MGT;  Service: Pain Management;  Laterality: N/A;    TUBAL LIGATION  2003    UPPER GASTROINTESTINAL ENDOSCOPY      10/11    uterine ablation  2003       Review of patient's allergies indicates:   Allergen Reactions    Actemra [tocilizumab] Other (See Comments)     Severe dizziness    Codeine Nausea And Vomiting and Hives    Gold au 198 Hives and Rash    Hydroxychloroquine Other (See Comments)     Can't remember the reaction      Iodinated contrast media Other (See Comments)     Other reaction(s): BURNING ALL OVER    Iodine Other (See Comments) and Hives     Other reaction(s): BURNING ALL OVER - Iodine dye - Not topical    Ondansetron Nausea And Vomiting    Sulfa (sulfonamide antibiotics) Other (See Comments)     Can't remember the reaction    Zofran [ondansetron hcl (pf)] Nausea And Vomiting     Pt reports that last time she received zofran she started vomiting again    Methotrexate analogues Nausea Only    Pneumococcal vaccine Other (See Comments)    Pneumovax 23 [pneumococcal 23-argenis ps vaccine] Other (See Comments)     "sick"    Methotrexate Nausea Only       Current Outpatient Medications   Medication Sig Dispense Refill    albuterol (PROVENTIL) 2.5 mg /3 mL (0.083 %) nebulizer solution Take 3 mLs (2.5 mg total) by nebulization every 6 (six) hours as needed for Wheezing. Rescue 75 mL 0    albuterol (PROVENTIL/VENTOLIN HFA) 90 mcg/actuation inhaler Inhale 2 puffs into the lungs every 6 (six) hours as needed. Rescue 20.1 g 11    azelastine (ASTELIN) 137 mcg (0.1 %) nasal spray Use 1 " spray (137 mcg total) in each nostril 2 (two) times daily. 30 mL 1    azithromycin (Z-DEVON) 250 MG tablet Take 2 tablets by mouth on day 1, then take 1 tablet daily until all taken 6 tablet 0    benzonatate (TESSALON) 100 MG capsule Take 1 capsule (100 mg total) by mouth 3 (three) times daily as needed for Cough. 30 capsule 1    budesonide-formoterol 160-4.5 mcg (SYMBICORT) 160-4.5 mcg/actuation HFAA Inhale 2 puffs into the lungs every 12 (twelve) hours. 30.6 g 3    calcium citrate-vitamin D3 315-200 mg (CITRACAL+D) 315 mg-5 mcg (200 unit) per tablet Take 1 tablet by mouth 2 (two) times daily.       cycloSPORINE (RESTASIS) 0.05 % ophthalmic emulsion PLACE 1 DROP INTO BOTH EYES TWICE A  each 3    diclofenac sodium (VOLTAREN) 1 % Gel APPLY 2 GRAMS TOPICALLY 4 (FOUR) TIMES DAILY AS NEEDED. 300 g 2    fluconazole (DIFLUCAN) 150 MG Tab Take 150 mg by mouth as needed.      fluticasone propionate (FLONASE) 50 mcg/actuation nasal spray Use 1 spray (50 mcg total) in each nostril once daily. 16 g 1    ibuprofen (ADVIL,MOTRIN) 800 MG tablet Take 1 tablet by mouth every 6 to 8 hours as needed for pain 30 tablet 0    INV PROPULSID 10 MG Take 2 tablets (20 mg) by mouth 4 (four) times daily. FOR INVESTIGATIONAL USE ONLY. Protocol [id number] Subject ID: [id number]. 1440 each 0    leflunomide (ARAVA) 20 MG Tab Take 1 tablet (20 mg total) by mouth once daily. 90 tablet 0    lifitegrast (XIIDRA) 5 % Dpet INSTILL ONE DROP INTO BOTH EYES TWICE A DAY 60 mL 10    methenamine (HIPREX) 1 gram Tab Take 1 tablet (1 g total) by mouth 2 (two) times daily. 180 tablet 3    methocarbamoL (ROBAXIN) 750 MG Tab Take 1 tablet (750 mg total) by mouth 4 (four) times daily. 120 tablet 0    methocarbamoL (ROBAXIN) 750 MG Tab Take 1 tablet (750 mg total) by mouth 3 (three) times daily as needed. 90 tablet 1    mirabegron (MYRBETRIQ) 25 mg Tb24 ER tablet Take 1 tablet (25 mg total) by mouth once daily. 90 tablet 3    montelukast (SINGULAIR) 10 mg tablet  Take 1 tablet (10 mg total) by mouth every evening. 90 tablet 3    naproxen (NAPROSYN) 500 MG tablet TAKE 1 TABLET (500 MG TOTAL) BY MOUTH 2 (TWO) TIMES DAILY AS NEEDED 180 tablet 1    omeprazole (PRILOSEC) 40 MG capsule Take 1 capsule (40 mg total) by mouth every morning. 30 capsule 6    omeprazole-sodium bicarbonate (ZEGERID) 40-1.1 mg-gram per capsule TAKE 1 CAPSULE BY MOUTH EVERY DAY IN THE MORNING 90 capsule 3    POTASSIUM ORAL Take by mouth once daily.      predniSONE (DELTASONE) 5 MG tablet TAKE 1 TABLET BY MOUTH EVERY DAY 90 tablet 1    pregabalin (LYRICA) 150 MG capsule Take 1 capsule (150 mg total) by mouth 3 (three) times daily. 90 capsule 2    promethazine (PHENERGAN) 25 MG tablet Take 1 tablet (25 mg total) by mouth every 6 (six) hours as needed for Nausea. 15 tablet 0    rosuvastatin (CRESTOR) 20 MG tablet Take 1 tablet by mouth every day in the evening 90 tablet 3    sarilumab (KEVZARA) 200 mg/1.14 mL Syrg Inject 1.14 mL (200 mg total) into the skin every 14 (fourteen) days. 2.28 mL 1    sucralfate (CARAFATE) 1 gram tablet TAKE 1 TABLET (1 G TOTAL) BY MOUTH 4 (FOUR) TIMES DAILY. 360 tablet 2    triamcinolone acetonide 0.1% (KENALOG) 0.1 % paste Place onto teeth 2 (two) times daily. Apply to oral ulcers twice daily until resolved 5 g 1    albuterol-ipratropium (DUO-NEB) 2.5 mg-0.5 mg/3 mL nebulizer solution Take 3 mLs by nebulization every 6 (six) hours as needed for Wheezing. Rescue 90 mL 3    [START ON 12/28/2023] oxyCODONE-acetaminophen (PERCOCET) 5-325 mg per tablet Take 1 tablet by mouth every 6 (six) hours as needed for Pain. 120 tablet 0     No current facility-administered medications for this visit.       Family History   Problem Relation Age of Onset    Cancer Mother         Lung Cancer    Emphysema Mother     Heart attack Mother     Alcohol abuse Mother     Cancer Father         Lung Cancer    Osteoarthritis Father     Lung cancer Father     Skin cancer Father     Alcohol abuse Father      Heart disease Brother     Heart attack Brother     Alcohol abuse Brother     Cirrhosis Brother     Osteoarthritis Paternal Aunt     Retinal detachment Maternal Aunt     No Known Problems Sister     No Known Problems Maternal Uncle     No Known Problems Paternal Uncle     No Known Problems Maternal Grandmother     No Known Problems Maternal Grandfather     No Known Problems Paternal Grandmother     No Known Problems Paternal Grandfather     Colon cancer Neg Hx     Esophageal cancer Neg Hx     Stomach cancer Neg Hx     Celiac disease Neg Hx     Diabetes Neg Hx     Thyroid disease Neg Hx     Amblyopia Neg Hx     Blindness Neg Hx     Cataracts Neg Hx     Glaucoma Neg Hx     Hypertension Neg Hx     Macular degeneration Neg Hx     Strabismus Neg Hx     Stroke Neg Hx        Social History     Socioeconomic History    Marital status:    Tobacco Use    Smoking status: Never    Smokeless tobacco: Never   Substance and Sexual Activity    Alcohol use: Yes     Comment: 2-3 times per year.    Drug use: No    Sexual activity: Yes     Partners: Male     Birth control/protection: Surgical     Social Determinants of Health     Financial Resource Strain: Patient Declined (12/5/2023)    Overall Financial Resource Strain (CARDIA)     Difficulty of Paying Living Expenses: Patient declined   Food Insecurity: Patient Declined (12/5/2023)    Hunger Vital Sign     Worried About Running Out of Food in the Last Year: Patient declined     Ran Out of Food in the Last Year: Patient declined   Transportation Needs: Patient Declined (12/5/2023)    PRAPARE - Transportation     Lack of Transportation (Medical): Patient declined     Lack of Transportation (Non-Medical): Patient declined   Physical Activity: Unknown (12/5/2023)    Exercise Vital Sign     Days of Exercise per Week: Patient declined     Minutes of Exercise per Session: 0 min   Recent Concern: Physical Activity - Inactive (9/6/2023)    Exercise Vital Sign     Days of Exercise per  Week: 0 days     Minutes of Exercise per Session: 0 min   Stress: Patient Declined (12/5/2023)    [employer] - Occupational Stress Questionnaire     Feeling of Stress : Patient declined   Social Connections: Unknown (12/5/2023)    Social Connection and Isolation Panel [NHANES]     Frequency of Communication with Friends and Family: Patient declined     Frequency of Social Gatherings with Friends and Family: Patient declined     Attends Anabaptist Services: More than 4 times per year     Active Member of Clubs or Organizations: Patient declined     Attends Club or Organization Meetings: Patient declined     Marital Status: Patient declined   Housing Stability: Unknown (12/5/2023)    Housing Stability Vital Sign     Unable to Pay for Housing in the Last Year: Patient refused     Number of Places Lived in the Last Year: 1     Unstable Housing in the Last Year: No           ROS        Objective:   /74   Pulse 91   Temp 98.4 °F (36.9 °C)   Resp 18   Wt 61.7 kg (136 lb 0.4 oz)   LMP  (LMP Unknown)   SpO2 98%   BMI 25.70 kg/m²   Pain Disability Index Review:      12/26/2023    10:40 AM 11/28/2023    11:25 AM 5/15/2023     9:40 AM   Last 3 PDI Scores   Pain Disability Index (PDI) 56 51 41         PHYSICAL EXAMINATION:    General appearance: Well appearing, in no acute distress, alert and oriented x3.  Psych:  Mood and affect appropriate.  Skin: Skin color normal, no rashes or lesions, in both upper and lower body.  Extremities: Moves all visualized extremities freely.      PREVIOUS PHYSICAL EXAM:  Normocephalic.  Atraumatic.  Affect appropriate.  Breathing unlabored.  Extra ocular muscles intact.           General    Vitals reviewed.    General Musculoskeletal Exam   Gait: antalgic     Right Ankle/Foot Exam     Tests   Heel Walk: able to perform  Tiptoe Walk: able to perform    Left Ankle/Foot Exam     Tests   Heel Walk: able to perform  Tiptoe Walk: able to perform      Right Hip  Exam     Range of Motion   External rotation:  normal   Internal rotation:  normal     Tests   Pain w/ forced internal rotation (SANDRA): absent  Elias: negative    Other   Sensation: normal  Left Hip Exam     Range of Motion   External rotation:  normal   Internal rotation: normal     Tests   Pain w/ forced internal rotation (SANDRA): absent  Elias: negative    Other   Sensation: normal      Back (L-Spine & T-Spine) / Neck (C-Spine) Exam     Back (L-Spine & T-Spine) Range of Motion   Extension:  abnormal Back extension: Limited due to pain.  Flexion:  abnormal     Back (L-Spine & T-Spine) Tests   Right Side Tests  Femoral Stretch: negative  Left Side Tests  Femoral Stretch: negative    Comments:   positive sacroiliac joint pain with finger Destin test, thigh thrust test and compression test.  Negative SANDRA's      Muscle Strength   Right Lower Extremity   Hip Flexion: 5/5   Quadriceps:  5/5   Hamstrin/5   Gastrocsoleus:  5/5   EHL:  5/5  Left Lower Extremity   Hip Flexion: 5/5   Quadriceps:  5/5   Hamstrin/5   Gastrocsoleus:  5/5   EHL:  5/5    Reflexes     Left Side  Babinski Sign:  absent  Ankle Clonus:  absent    Right Side   Babinski Sign:  absent  Ankle Clonus:  absent    Vascular Exam       Capillary Refill  Right Hand: normal capillary refill  Left Hand: normal capillary refill        Edema  Right Upper Leg: absent  Left Upper Leg: absent  Positive left hip pain with internal external rotation specially on the lateral aspect.  No groin pain.  Palpation of the joint line produces pain.      Assessment:       Encounter Diagnoses   Name Primary?    Chronic hip pain, left Yes    Left hip pain     S/P spinal surgery            Plan:   We discussed with the patient the assessment and recommendations. The following is the plan we agreed on:  SCS trial [place].  To be done after the MRI is done.  Continue current medications.  Refill the patient's oxycodone 5 q.i.d. p.r.n..  The patient will continue a home  exercise routine to help with pain and strengthening.    Left hip obturator and femoral cooled radiofrequency ablation.  Return to clinic as needed otherwise follow-up 6 weeks after the procedure.        The above plan and management options were discussed at length with patient. Patient is in agreement with the above and verbalized understanding.     Tj Mayfield MD  12/26/2023

## 2023-12-27 ENCOUNTER — PATIENT MESSAGE (OUTPATIENT)
Dept: PAIN MEDICINE | Facility: OTHER | Age: 63
End: 2023-12-27
Payer: MEDICARE

## 2023-12-28 ENCOUNTER — RESEARCH ENCOUNTER (OUTPATIENT)
Dept: RESEARCH | Facility: OTHER | Age: 63
End: 2023-12-28
Payer: MEDICARE

## 2023-12-28 NOTE — PROGRESS NOTES
Juanita [facility]finder [id number]    Juanita ID: [id number]     Results the [facility] Multi Cancer Early Detection test and were reviewed by PI Dr Vasquez. Patient was called and notified of test results, there was no signal detected.    Patient reminded, this was a screening test, so we still highly encourage them to continue other normal health screenings  and to continue to adhere to guideline-recommended cancer screening.    Patient was asked about completing 30 day questionnaire online and was agreeable.

## 2023-12-29 ENCOUNTER — PATIENT MESSAGE (OUTPATIENT)
Dept: RHEUMATOLOGY | Facility: CLINIC | Age: 63
End: 2023-12-29
Payer: MEDICARE

## 2024-01-02 ENCOUNTER — PATIENT MESSAGE (OUTPATIENT)
Dept: ENDOCRINOLOGY | Facility: CLINIC | Age: 64
End: 2024-01-02
Payer: MEDICARE

## 2024-01-03 ENCOUNTER — PATIENT MESSAGE (OUTPATIENT)
Dept: PAIN MEDICINE | Facility: OTHER | Age: 64
End: 2024-01-03
Payer: MEDICARE

## 2024-01-04 ENCOUNTER — OFFICE VISIT (OUTPATIENT)
Dept: PODIATRY | Facility: CLINIC | Age: 64
End: 2024-01-04
Payer: MEDICARE

## 2024-01-04 DIAGNOSIS — L97.511 ULCER OF RIGHT FOOT, LIMITED TO BREAKDOWN OF SKIN: Primary | ICD-10-CM

## 2024-01-04 DIAGNOSIS — M20.41 HAMMER TOE OF RIGHT FOOT: ICD-10-CM

## 2024-01-04 DIAGNOSIS — M05.79 RHEUMATOID ARTHRITIS INVOLVING MULTIPLE SITES WITH POSITIVE RHEUMATOID FACTOR: ICD-10-CM

## 2024-01-04 PROCEDURE — 99213 OFFICE O/P EST LOW 20 MIN: CPT | Mod: S$GLB,,, | Performed by: PODIATRIST

## 2024-01-04 PROCEDURE — 99999 PR PBB SHADOW E&M-EST. PATIENT-LVL IV: CPT | Mod: PBBFAC,,, | Performed by: PODIATRIST

## 2024-01-04 PROCEDURE — 1159F MED LIST DOCD IN RCRD: CPT | Mod: CPTII,S$GLB,, | Performed by: PODIATRIST

## 2024-01-04 NOTE — PROGRESS NOTES
[facility] - PODIATRY  [address] [place] [address], [address]  [facility] [address]  Dept: [phone]  Dept Fax: [phone]    Isiah Mark Jr., MARSHALL     Assessment:   MDM    Coding  1. Ulcer of right foot, limited to breakdown of skin        2. Rheumatoid arthritis involving multiple sites with positive rheumatoid factor        3. Hammer toe of right foot            Plan:     Procedures  Joyce was seen today for foot problem.    Diagnoses and all orders for this visit:    Ulcer of right foot, limited to breakdown of skin    Rheumatoid arthritis involving multiple sites with positive rheumatoid factor    Hammer toe of right foot        -pt seen, evaluated, and managed  -dx discussed in detail. All questions/concerns addressed  -all tx options discussed. All alternatives, risks, benefits of all txs discussed  -the patient was educated about the diagnosis  -clinically a R 3rd toe pressure ulceration from RA deformity of toe  -this is an ulceration without s/s of infection, that is stable/improving  -no debridement needed today   -collagen based DSD applied. Pt to change QD at home    -rxs dispensed: none  -referrals: none  -WB: wbat    Short-term goals include but are not limited to: maintaining good offloading and minimizing bioburden, promoting granulation and epithelialization to healing.  Wound healing is a medically reasonable expectation based on the clinical circumstances documented.    Long-term goals include but are not limited to: keeping the wound healed by good offloading and medical management under the direction of internist.    Advised pt of risks of current condition including but not limited to: worsening of infection, potential for limb loss    Follow-up: Patient is to return to the clinic in 4 week for follow-up but should call [facility] immediately if any signs of infection, such as fever, chills, sweats, increased redness or pain.    Follow up in about 4 weeks (around  2/1/2024).    Subjective:      Patient ID: Joyce Peterson is a 63 y.o. female.    Chief Complaint:   Chief Complaint   Patient presents with    Foot Problem     Right middle toe        CC - foot ulcer: Patient presents to the clinic for evaluation and treatment of high risk feet. The patient's chief complaint is foot ulcer, R foot. duration: several wks. denies n/v/f/c.    Of note - she is s/p left triple arthrodesis, midfoot arthrodesis, ARLEN, CBG on 1/11/23 w/ dr. Finnegan. Overall she is very happy with result of L foot reconstruction.      1/4/24:  Hx as above. F/u for ulceration R foot. Denies n/v/f/c. Using collagen at home.    Foot Ulcer    Toe Pain         Last Podiatry Enc: Visit date not found  Last Enc w/ Me: Visit date not found    Outside reports reviewed: historical medical records.  Family hx: as below  Past Medical History:   Diagnosis Date    Acid reflux     Allergy     Anemia     Asthma     Coronary artery disease     CS (cervical spondylosis) 03/08/2013    Degenerative disc disease     Degenerative joint disease of hindfoot, left 6/28/2022    Dry eyes     Dry mouth     Gastroparesis     History of methotrexate therapy 01/19/2022    Hyperlipidemia     Lateral meniscus derangement 04/06/2016    Lobular carcinoma in situ     Lumbar spondylosis 03/08/2013    Osteoarthritis     Osteoporosis     Pes planovalgus, acquired, left 6/28/2022    Rheumatoid arthritis(714.0)     Rupture of left triceps tendon 10/17/2018    Umbilical hernia 08/13/2015     Past Surgical History:   Procedure Laterality Date    BREAST BIOPSY Left 01/29/2002    core bx    CARPAL TUNNEL RELEASE Right 05/2017    CHOLECYSTECTOMY  2004    COLONOSCOPY      10/11    COLONOSCOPY N/A 6/29/2017    Procedure: COLONOSCOPY;  Surgeon: López Moore MD;  Location: [facility] ([address] [place] [address]);  Service: Endoscopy;  Laterality: N/A;    EPIDURAL STEROID INJECTION N/A 11/18/2021    Procedure: INJECTION, STEROID, EPIDURAL, L5-S1 IL NEED CONSENT;  Surgeon:  Tj Mayfield MD;  Location: [facility] PAIN MGT;  Service: Pain Management;  Laterality: N/A;    EPIDURAL STEROID INJECTION N/A 9/29/2022    Procedure: LUMBAR L3/L4 IL MADELINE CONTRAST;  Surgeon: Tj Mayfield MD;  Location: [facility] PAIN MGT;  Service: Pain Management;  Laterality: N/A;    EPIDURAL STEROID INJECTION N/A 12/12/2022    Procedure: INJECTION, STEROID, EPIDURAL L5/S1 IL CONTRAST;  Surgeon: Tj Mayfield MD;  Location: [facility] PAIN MGT;  Service: Pain Management;  Laterality: N/A;    EPIDURAL STEROID INJECTION N/A 4/6/2023    Procedure: INJECTION, STEROID, EPIDURAL L5/S1;  Surgeon: Tj Mayfield MD;  Location: [facility] PAIN MGT;  Service: Pain Management;  Laterality: N/A;    FOOT ARTHRODESIS Left 1/11/2023    Procedure: FUSION, FOOT;  Surgeon: Ariella Finnegan MD;  Location: [facility] OR;  Service: Orthopedics;  [facility]: Left;  [facility]    FUSION, SPINE, LUMBAR, TLIF, POSTERIOR APPROACH, USING PEDICLE SCREW N/A 6/22/2023    Procedure: FUSION, SPINE, LUMBAR, TLIF, POSTERIOR APPROACH, USING PEDICLE SCREW L4/5 spinewave SNS:SSEP/EMG;  Surgeon: Jh Mosqueda MD;  Location: [address];  Service: Neurosurgery;  Laterality: N/A;    HARVESTING OF BONE GRAFT Left 1/11/2023    Procedure: SURGICAL PROCUREMENT, BONE GRAFT;  Surgeon: Ariella Finnegan MD;  Location: [facility] OR;  Service: Orthopedics;  Laterality: Left;    INJECTION OF ANESTHETIC AGENT AROUND NERVE Bilateral 8/23/2021    Procedure: BLOCK, NERVE, MEDIAL BRANCH L3,L4,L5;  Surgeon: Tj Mayfield MD;  Location: [facility] PAIN MGT;  Service: Pain Management;  Laterality: Bilateral;  1 of 2    INJECTION OF ANESTHETIC AGENT AROUND NERVE Bilateral 8/26/2021    Procedure: BLOCK, NERVE, MEDIAL BRANCH L3,L4,L5;  Surgeon: Tj Mayfield MD;  Location: [facility] PAIN MGT;  Service: Pain Management;  Laterality: Bilateral;  2 of 2    INJECTION OF ANESTHETIC AGENT AROUND NERVE Left 7/7/2022    Procedure: BLOCK, NERVE, LEFT OBTURATOR AND FEMORAL;  Surgeon: Tj Mayfield MD;  Location: [facility] PAIN MGT;   Service: Pain Management;  Laterality: Left;    INJECTION OF FACET JOINT Bilateral 3/12/2020    Procedure: FACET JOINT INJECTION (LUMBAR BLOCK) BILATERAL L4-5 AND L5-S1 DIRECT REFERRAL;  Surgeon: Tj Mayfield MD;  Location: [facility];  Service: Pain Management;  Laterality: Bilateral;  NEEDS CONSENT    INJECTION OF FACET JOINT Bilateral 3/29/2021    Procedure: INJECTION, FACET JOINT L3/4, L4/5, L5/S1;  Surgeon: Tj Mayfield MD;  Location: [facility];  Service: Pain Management;  Laterality: Bilateral;    INJECTION OF JOINT Right 7/30/2020    Procedure: INJECTION, JOINT, RIGHT HIP Interarticular under flouro;  Surgeon: jT Mayfield MD;  Location: [facility];  Service: Pain Management;  Laterality: Right;  INJECTION, JOINT, RIGHT HIP Interarticular under flouro    INJECTION OF JOINT Right 2/21/2022    Procedure: Injection, Joint RIGHT ISCHIAL BURSA;  Surgeon: Tj Mayfield MD;  Location: [facility];  Service: Pain Management;  Laterality: Right;    INTRAMEDULLARY RODDING OF FEMUR Left 5/21/2019    Procedure: INSERTION, INTRAMEDULLARY ARIEL, FEMUR;  Surgeon: López Duff MD;  Location: [address];  Service: Orthopedics;  Laterality: Left;    LENGTHENING OF TENDON OF LOWER EXTREMITY Left 1/11/2023    Procedure: LENGTHENING, TENDON, LOWER EXTREMITY;  Surgeon: Ariella Finnegan MD;  Location: [facility] OR;  Service: Orthopedics;  Laterality: Left;    MAGNETIC RESONANCE IMAGING N/A 5/29/2023    Procedure: MRI (Magnetic Resonance Imagine);  Surgeon: Sujey Robin;  Location: [facility];  Service: Anesthesiology;  Laterality: N/A;    RADIOFREQUENCY ABLATION Left 9/20/2021    Procedure: RADIOFREQUENCY ABLATION left L3,4,5 RFA  1 of 2  CONSENT NEEDED;  Surgeon: Tj Mayfield MD;  Location: [facility];  Service: Pain Management;  Laterality: Left;    RADIOFREQUENCY ABLATION Right 10/4/2021    Procedure: RADIOFREQUENCY ABLATION  right L3,4,5 RFA   2 of 2  CONSENT NEEDED;  Surgeon: Tj Mayfield MD;  Location: [facility];  Service: Pain Management;  Laterality: Right;    RADIOFREQUENCY ABLATION Left 4/21/2022    Procedure: Radiofrequency Ablation LEFT L3,L4,L5;  Surgeon: Tj Mayfield MD;  Location: [facility] PAIN MGT;  Service: Pain Management;  Laterality: Left;    RADIOFREQUENCY ABLATION Right 5/12/2022    Procedure: Radiofrequency Ablation RIGHT L3,L4,L5;  Surgeon: Tj Mayfield MD;  Location: [facility] PAIN MGT;  Service: Pain Management;  Laterality: Right;    RADIOFREQUENCY ABLATION Left 7/25/2022    Procedure: Radiofrequency Ablation Left Femoral & Obturator;  Surgeon: Tj Mayfield MD;  Location: [facility] PAIN MGT;  Service: Pain Management;  Laterality: Left;    REPAIR OF TRICEPS TENDON Left 10/17/2018    Procedure: REPAIR, TENDON, TRICEPS left elbow;  Surgeon: Staci Yarbrough MD;  Location: [facility] OR [address] [place] [address];  Service: Orthopedics;  Laterality: Left;  Anesthesia: General and regional. PRONE, k-wire , hand pan 1 and pan 2, CALL ARTHREX/Tamera notified 10-12 LO    TONSILLECTOMY      TRANSFORAMINAL EPIDURAL INJECTION OF STEROID Right 8/31/2020    Procedure: INJECTION, STEROID, EPIDURAL, TRANSFORAMINAL,  APPROACH, L3-L4 and L4-L5 need consent;  Surgeon: Tj Mayfield MD;  Location: [facility] PAIN MGT;  Service: Pain Management;  Laterality: Right;    TRANSFORAMINAL EPIDURAL INJECTION OF STEROID Bilateral 7/23/2021    Procedure: INJECTION, STEROID, EPIDURAL, TRANSFORAMINAL APPROACH L4/5;  Surgeon: Tj Mayfield MD;  Location: [facility] PAIN MGT;  Service: Pain Management;  Laterality: Bilateral;    TRANSFORAMINAL EPIDURAL INJECTION OF STEROID Bilateral 9/25/2023    Procedure: LUMBAR TRANSFORAMINAL BILATERAL  L2/3 DIRECT REFERRAL;  Surgeon: Tj Mayfield MD;  Location: [facility] PAIN MGT;  Service: Pain Management;  Laterality: Bilateral;    TRANSFORAMINAL EPIDURAL INJECTION OF STEROID Left 10/18/2023    Procedure: LUMBAR TRANSFORAMINAL LEFT L3/4 AND L4/5 * CLEARANCE IN CHART*;  Surgeon: Tj Mayfield MD;  Location: [facility] PAIN MGT;   Service: Pain Management;  Laterality: Left;    TRIGGER POINT INJECTION N/A 7/23/2021    Procedure: INJECTION, TRIGGER POINT SCAPULAR;  Surgeon: Tj Mayfield MD;  Location: [facility] PAIN MGT;  Service: Pain Management;  Laterality: N/A;    TUBAL LIGATION  2003    UPPER GASTROINTESTINAL ENDOSCOPY      10/11    uterine ablation  2003     Family History   Problem Relation Age of Onset    Cancer Mother         Lung Cancer    Emphysema Mother     Heart attack Mother     Alcohol abuse Mother     Cancer Father         Lung Cancer    Osteoarthritis Father     Lung cancer Father     Skin cancer Father     Alcohol abuse Father     Heart disease Brother     Heart attack Brother     Alcohol abuse Brother     Cirrhosis Brother     Osteoarthritis Paternal Aunt     Retinal detachment Maternal Aunt     No Known Problems Sister     No Known Problems Maternal Uncle     No Known Problems Paternal Uncle     No Known Problems Maternal Grandmother     No Known Problems Maternal Grandfather     No Known Problems Paternal Grandmother     No Known Problems Paternal Grandfather     Colon cancer Neg Hx     Esophageal cancer Neg Hx     Stomach cancer Neg Hx     Celiac disease Neg Hx     Diabetes Neg Hx     Thyroid disease Neg Hx     Amblyopia Neg Hx     Blindness Neg Hx     Cataracts Neg Hx     Glaucoma Neg Hx     Hypertension Neg Hx     Macular degeneration Neg Hx     Strabismus Neg Hx     Stroke Neg Hx      Current Outpatient Medications   Medication Sig Dispense Refill    albuterol (PROVENTIL) 2.5 mg /3 mL (0.083 %) nebulizer solution Take 3 mLs (2.5 mg total) by nebulization every 6 (six) hours as needed for Wheezing. Rescue 75 mL 0    albuterol (PROVENTIL/VENTOLIN HFA) 90 mcg/actuation inhaler Inhale 2 puffs into the lungs every 6 (six) hours as needed. Rescue 20.1 g 11    amoxicillin-clavulanate 875-125mg (AUGMENTIN) 875-125 mg per tablet Take 1 tablet by mouth every 12 hours 20 tablet 0    azelastine (ASTELIN) 137 mcg (0.1 %) nasal spray  Use 1 spray (137 mcg total) in each nostril 2 (two) times daily. 30 mL 1    azithromycin (Z-DEVON) 250 MG tablet Take 2 tablets by mouth on day 1, then take 1 tablet daily until all taken 6 tablet 0    benzonatate (TESSALON) 100 MG capsule Take 1 capsule (100 mg total) by mouth 3 (three) times daily as needed for Cough. 30 capsule 1    budesonide-formoterol 160-4.5 mcg (SYMBICORT) 160-4.5 mcg/actuation HFAA Inhale 2 puffs into the lungs every 12 (twelve) hours. 30.6 g 3    calcium citrate-vitamin D3 315-200 mg (CITRACAL+D) 315 mg-5 mcg (200 unit) per tablet Take 1 tablet by mouth 2 (two) times daily.       chlorhexidine (PERIDEX) 0.12 % solution Rinse with 2 teaspoons (10 ml's) by mouth three times daily as directed 473 mL 0    cycloSPORINE (RESTASIS) 0.05 % ophthalmic emulsion PLACE 1 DROP INTO BOTH EYES TWICE A  each 3    diclofenac sodium (VOLTAREN) 1 % Gel APPLY 2 GRAMS TOPICALLY 4 (FOUR) TIMES DAILY AS NEEDED. 300 g 2    fluconazole (DIFLUCAN) 100 MG tablet Take 1 tablet by mouth once for 1 dose 5 tablet 0    fluconazole (DIFLUCAN) 150 MG Tab Take 150 mg by mouth as needed.      fluticasone propionate (FLONASE) 50 mcg/actuation nasal spray Use 1 spray (50 mcg total) in each nostril once daily. 16 g 1    ibuprofen (ADVIL,MOTRIN) 800 MG tablet Take 1 tablet by mouth every 6 to 8 hours as needed for pain 30 tablet 0    INV PROPULSID 10 MG Take 2 tablets (20 mg) by mouth 4 (four) times daily. FOR INVESTIGATIONAL USE ONLY. Protocol [id number] Subject ID: [id number]. 1440 each 0    leflunomide (ARAVA) 20 MG Tab Take 1 tablet (20 mg total) by mouth once daily. 90 tablet 0    lifitegrast (XIIDRA) 5 % Dpet INSTILL ONE DROP INTO BOTH EYES TWICE A DAY 60 mL 10    magic mouthwash diphen/antac/lidoc rinse mouth with 5 ml for 30 seconds and spit out, twice daily 150 mL 2    methenamine (HIPREX) 1 gram Tab Take 1 tablet (1 g total) by mouth 2 (two) times daily. 180 tablet 3    methocarbamoL (ROBAXIN) 750 MG Tab Take 1  tablet (750 mg total) by mouth 4 (four) times daily. 120 tablet 0    methocarbamoL (ROBAXIN) 750 MG Tab Take 1 tablet (750 mg total) by mouth 3 (three) times daily as needed. 90 tablet 1    mirabegron (MYRBETRIQ) 25 mg Tb24 ER tablet Take 1 tablet (25 mg total) by mouth once daily. 90 tablet 3    montelukast (SINGULAIR) 10 mg tablet Take 1 tablet (10 mg total) by mouth every evening. 90 tablet 3    naproxen (NAPROSYN) 500 MG tablet TAKE 1 TABLET (500 MG TOTAL) BY MOUTH 2 (TWO) TIMES DAILY AS NEEDED 180 tablet 1    omeprazole (PRILOSEC) 40 MG capsule Take 1 capsule (40 mg total) by mouth every morning. 30 capsule 6    omeprazole-sodium bicarbonate (ZEGERID) 40-1.1 mg-gram per capsule TAKE 1 CAPSULE BY MOUTH EVERY DAY IN THE MORNING 90 capsule 3    oxyCODONE-acetaminophen (PERCOCET) 5-325 mg per tablet Take 1 tablet by mouth every 6 (six) hours as needed for Pain. 120 tablet 0    POTASSIUM ORAL Take by mouth once daily.      predniSONE (DELTASONE) 5 MG tablet TAKE 1 TABLET BY MOUTH EVERY DAY 90 tablet 1    pregabalin (LYRICA) 150 MG capsule Take 1 capsule (150 mg total) by mouth 3 (three) times daily. 90 capsule 2    promethazine (PHENERGAN) 25 MG tablet Take 1 tablet (25 mg total) by mouth every 6 (six) hours as needed for Nausea. 15 tablet 0    rosuvastatin (CRESTOR) 20 MG tablet Take 1 tablet by mouth every day in the evening 90 tablet 3    sarilumab (KEVZARA) 200 mg/1.14 mL Syrg Inject 1.14 mL (200 mg total) into the skin every 14 (fourteen) days. 2.28 mL 1    sucralfate (CARAFATE) 1 gram tablet TAKE 1 TABLET (1 G TOTAL) BY MOUTH 4 (FOUR) TIMES DAILY. 360 tablet 2    triamcinolone acetonide 0.1% (KENALOG) 0.1 % paste Place onto teeth 2 (two) times daily. Apply to oral ulcers twice daily until resolved 5 g 1    albuterol-ipratropium (DUO-NEB) 2.5 mg-0.5 mg/3 mL nebulizer solution Take 3 mLs by nebulization every 6 (six) hours as needed for Wheezing. Rescue 90 mL 3     No current facility-administered medications  "for this visit.     Review of patient's allergies indicates:   Allergen Reactions    Actemra [tocilizumab] Other (See Comments)     Severe dizziness    Codeine Nausea And Vomiting and Hives    Gold au 198 Hives and Rash    Hydroxychloroquine Other (See Comments)     Can't remember the reaction      Iodinated contrast media Other (See Comments)     Other reaction(s): BURNING ALL OVER    Iodine Other (See Comments) and Hives     Other reaction(s): BURNING ALL OVER - Iodine dye - Not topical    Ondansetron Nausea And Vomiting    Sulfa (sulfonamide antibiotics) Other (See Comments)     Can't remember the reaction    Zofran [ondansetron hcl (pf)] Nausea And Vomiting     Pt reports that last time she received zofran she started vomiting again    Methotrexate analogues Nausea Only    Pneumococcal vaccine Other (See Comments)    Pneumovax 23 [pneumococcal 23-argenis ps vaccine] Other (See Comments)     "sick"    Methotrexate Nausea Only     Social History     Socioeconomic History    Marital status:    Tobacco Use    Smoking status: Never    Smokeless tobacco: Never   Substance and Sexual Activity    Alcohol use: Yes     Comment: 2-3 times per year.    Drug use: No    Sexual activity: Yes     Partners: Male     Birth control/protection: Surgical     Social Determinants of Health     Financial Resource Strain: Patient Declined (12/5/2023)    Overall Financial Resource Strain (CARDIA)     Difficulty of Paying Living Expenses: Patient declined   Food Insecurity: Patient Declined (12/5/2023)    Hunger Vital Sign     Worried About Running Out of Food in the Last Year: Patient declined     Ran Out of Food in the Last Year: Patient declined   Transportation Needs: Patient Declined (12/5/2023)    PRAPARE - Transportation     Lack of Transportation (Medical): Patient declined     Lack of Transportation (Non-Medical): Patient declined   Physical Activity: Unknown (12/5/2023)    Exercise Vital Sign     Days of Exercise per Week: " Patient declined     Minutes of Exercise per Session: 0 min   Recent Concern: Physical Activity - Inactive (9/6/2023)    Exercise Vital Sign     Days of Exercise per Week: 0 days     Minutes of Exercise per Session: 0 min   Stress: Patient Declined (12/5/2023)    [employer] - Occupational Stress Questionnaire     Feeling of Stress : Patient declined   Social Connections: Unknown (12/5/2023)    Social Connection and Isolation Panel [NHANES]     Frequency of Communication with Friends and Family: Patient declined     Frequency of Social Gatherings with Friends and Family: Patient declined     Attends [employer] Services: More than 4 times per year     Active Member of Clubs or Organizations: Patient declined     Attends Club or Organization Meetings: Patient declined     Marital Status: Patient declined   Housing Stability: Unknown (12/5/2023)    Housing Stability Vital Sign     Unable to Pay for Housing in the Last Year: Patient refused     Number of Places Lived in the Last Year: 1     Unstable Housing in the Last Year: No       ROS    REVIEW OF SYSTEMS: Negative as documented below as well as positive findings in bold.       Constitutional  Respiratory  Gastrointestinal  Skin   - Fever - Cough - Heartburn - Rash   - Chills - Spit blood - Nausea - Itching   - Weight Loss - Shortness of breath - Vomiting - Nail pain   - Malaise/Fatigue - Wheezing - Abdominal Pain  Wound/Ulcer   - Weight Gain   - Blood in Stool  Poor wound healing       - Diarrhea          Cardiovascular  Genitourinary  Neurological  HEENT   - Chest Pain - Dysuria - Burning Sensation of feet - Headache   - Palpitations - Hematuria - Tingling / Paresthesia - Congestion   - Pain at night in legs - Flank Pain - Dizziness - Sore Throat   - Cramping   - Tremor - Blurred Vision   - Leg Swelling   - Sensory Change - Double Vision   - Dizzy when standing   - Speech Change - Eye Redness       - Focal Weakness - Dry Eyes       -  Loss of Consciousness          Endocrine  Musculoskeletal  Psychiatric   - Cold intolerance - Muscle Pain - Depression   - Heat intolerance - Neck Pain - Insomnia   - Anemia - Joint Pain - Memory Loss   -  Easy bruising, bleeding - Heel pain - Anxiety      Toe Pain        Leg/Ankle/Foot Pain         Objective:     LMP  (LMP Unknown)   Vitals:    01/04/24 1310   PainSc: 0-No pain           Physical Exam    General Appearance:   Patient appears well developed, well nourished  Patient appears stated age    Psychiatric:   Patient is oriented to time, place, and person.  Patient has appropriate mood and affect    Neck:  Trachea Midline  No visible masses    Respiratory/Ears:  No distress or labored breathing.  Able to differentiate between normal talking voice and whisper.  Able to follow commands    Eyes:  Visual Acuity intact  Lids and conjunctivae normal. No discoloration noted.    Physical Exam  Ortho Exam  Ortho/SPM Exam  Foot Exam  Physical Exam  Neurologic Exam    R LE exam con't:  V:  DP 1/4, PT 2/4   CRT< 3s to all digits tested   Tibial and popliteal lymph nodes are w/o abnormality    No edema, + VV    N:  Patient displays normal ankle reflexes   SILT in SP/DP/T/Opal/Saph distributions    Ortho: +Motor EHL/FHL/TA/GA   +TTP R 3rd toe   Compartments soft/compressible. No pain on passive stretch of big toe. No calf  Pain.   hammertoe deformity present 3rd toe    Derm:  skin not intact, ulceration present,  ulcer probes to subQ      Ulcer Location: 3rd toe medial  Measurements (post-debridement): 0.3x0.3x0.5cm  Periwound: hyperkeratotic  Drainage: none  Malodor: none  Base:  fibrotic  Signs of infection: none    Imaging / Labs:    Hemoglobin A1C   Date Value Ref Range Status   03/15/2022 5.2 4.0 - 5.6 % Final     Comment:     ADA Screening Guidelines:  5.7-6.4%  Consistent with prediabetes  >or=6.5%  Consistent with diabetes    High levels of fetal hemoglobin interfere with the HbA1C  assay. Heterozygous hemoglobin  variants (HbS, HgC, etc)do  not significantly interfere with this assay.   However, presence of multiple variants may affect accuracy.     02/06/2017 6.0 4.5 - 6.2 % Final     Comment:     According to ADA guidelines, hemoglobin A1C <7.0% represents  optimal control in non-pregnant diabetic patients.  Different  metrics may apply to specific populations.   Standards of Medical Care in Diabetes - 2016.  For the purpose of screening for the presence of diabetes:  <5.7%     Consistent with the absence of diabetes  5.7-6.4%  Consistent with increasing risk for diabetes   (prediabetes)  >or=6.5%  Consistent with diabetes  Currently no consensus exists for use of hemoglobin A1C  for diagnosis of diabetes for children.     09/28/2015 5.6 4.5 - 6.2 % Final           Note: This was dictated using a computer transcription program. Although proofread, it may contain computer transcription errors and phonetic errors. Other human proofreading errors may also exist. Corrections may be performed at a later time. Please contact us for any clarification if needed.    Isiah Mark DPM  [facility]

## 2024-01-08 ENCOUNTER — PATIENT OUTREACH (OUTPATIENT)
Dept: ADMINISTRATIVE | Facility: HOSPITAL | Age: 64
End: 2024-01-08
Payer: MEDICARE

## 2024-01-08 ENCOUNTER — PATIENT MESSAGE (OUTPATIENT)
Dept: ORTHOPEDICS | Facility: CLINIC | Age: 64
End: 2024-01-08
Payer: MEDICARE

## 2024-01-08 ENCOUNTER — PATIENT MESSAGE (OUTPATIENT)
Dept: ADMINISTRATIVE | Facility: HOSPITAL | Age: 64
End: 2024-01-08
Payer: MEDICARE

## 2024-01-08 NOTE — PROGRESS NOTES
Care Everywhere updates requested and reviewed.  Immunizations reconciled. Media reports reviewed.  Duplicate HM overrides and  orders removed.  Overdue HM topic chart audit and/or requested.  Overdue lab testing linked to upcoming lab appointments if applies.            DIS reviewed      Mammogram     Health Maintenance Due   Topic Date Due    Sign Pain Contract  Never done    RSV Vaccine (Age 60+ and Pregnant patients) (1 - 1-dose 60+ series) Never done    TETANUS VACCINE  2022    Mammogram  2022    COVID-19 Vaccine (2023- season) 2023    Lipid Panel  10/03/2023

## 2024-01-09 NOTE — PRE-PROCEDURE INSTRUCTIONS
PreOp Instructions given:   - Verbal medication information (what to hold and what to take)   - NPO guidelines [id number]  - Arrival place directions given; time to be given the day before procedure by the   [facility] - [address] [facility] – [place], [place]   - Bathing with antibacterial soap   - Don't wear any jewelry or bring any valuables AM of surgery   - No makeup or moisturizer to face   - No perfume/cologne, powder, lotions or aftershave   Pt. verbalized understanding.   Pt denies any h/o Anesthesia/Sedation complications or side effects.

## 2024-01-10 ENCOUNTER — ANESTHESIA EVENT (OUTPATIENT)
Dept: ENDOSCOPY | Facility: HOSPITAL | Age: 64
End: 2024-01-10
Payer: MEDICARE

## 2024-01-10 ENCOUNTER — HOSPITAL ENCOUNTER (OUTPATIENT)
Dept: RADIOLOGY | Facility: HOSPITAL | Age: 64
Discharge: HOME | End: 2024-01-10
Attending: PHYSICIAN ASSISTANT
Payer: MEDICARE

## 2024-01-10 ENCOUNTER — HOSPITAL ENCOUNTER (OUTPATIENT)
Facility: HOSPITAL | Age: 64
Discharge: HOME | End: 2024-01-10
Attending: ANESTHESIOLOGY | Admitting: ANESTHESIOLOGY
Payer: MEDICARE

## 2024-01-10 ENCOUNTER — ANESTHESIA (OUTPATIENT)
Dept: ENDOSCOPY | Facility: HOSPITAL | Age: 64
End: 2024-01-10
Payer: MEDICARE

## 2024-01-10 ENCOUNTER — PATIENT MESSAGE (OUTPATIENT)
Dept: PAIN MEDICINE | Facility: OTHER | Age: 64
End: 2024-01-10
Payer: MEDICARE

## 2024-01-10 VITALS
HEART RATE: 69 BPM | BODY MASS INDEX: 25.68 KG/M2 | SYSTOLIC BLOOD PRESSURE: 135 MMHG | OXYGEN SATURATION: 98 % | TEMPERATURE: 98 F | DIASTOLIC BLOOD PRESSURE: 75 MMHG | HEIGHT: 61 IN | RESPIRATION RATE: 18 BRPM | WEIGHT: 136 LBS

## 2024-01-10 DIAGNOSIS — M96.1 POSTLAMINECTOMY SYNDROME OF LUMBAR REGION: ICD-10-CM

## 2024-01-10 DIAGNOSIS — G89.4 CHRONIC PAIN SYNDROME: Primary | ICD-10-CM

## 2024-01-10 DIAGNOSIS — M54.9 DORSALGIA, UNSPECIFIED: ICD-10-CM

## 2024-01-10 DIAGNOSIS — M54.9 BACK PAIN: ICD-10-CM

## 2024-01-10 PROCEDURE — 72158 MRI LUMBAR SPINE W/O & W/DYE: CPT | Mod: 26,,, | Performed by: RADIOLOGY

## 2024-01-10 PROCEDURE — 63600175 PHARM REV CODE 636 W HCPCS: Performed by: NURSE ANESTHETIST, CERTIFIED REGISTERED

## 2024-01-10 PROCEDURE — 25000003 PHARM REV CODE 250: Performed by: NURSE ANESTHETIST, CERTIFIED REGISTERED

## 2024-01-10 PROCEDURE — D9220A PRA ANESTHESIA: Mod: ANES,,, | Performed by: STUDENT IN AN ORGANIZED HEALTH CARE EDUCATION/TRAINING PROGRAM

## 2024-01-10 PROCEDURE — 25500020 PHARM REV CODE 255: Performed by: PHYSICIAN ASSISTANT

## 2024-01-10 PROCEDURE — 71000044 HC DOSC ROUTINE RECOVERY FIRST HOUR

## 2024-01-10 PROCEDURE — D9220A PRA ANESTHESIA: Mod: CRNA,,, | Performed by: NURSE ANESTHETIST, CERTIFIED REGISTERED

## 2024-01-10 PROCEDURE — A9585 GADOBUTROL INJECTION: HCPCS | Performed by: PHYSICIAN ASSISTANT

## 2024-01-10 PROCEDURE — 37000008 HC ANESTHESIA 1ST 15 MINUTES

## 2024-01-10 PROCEDURE — 37000009 HC ANESTHESIA EA ADD 15 MINS

## 2024-01-10 PROCEDURE — 72158 MRI LUMBAR SPINE W/O & W/DYE: CPT | Mod: TC

## 2024-01-10 RX ORDER — MIDAZOLAM HYDROCHLORIDE 1 MG/ML
INJECTION, SOLUTION INTRAMUSCULAR; INTRAVENOUS
Status: DISCONTINUED | OUTPATIENT
Start: 2024-01-10 | End: 2024-01-10

## 2024-01-10 RX ORDER — LIDOCAINE HYDROCHLORIDE 10 MG/ML
1 INJECTION, SOLUTION EPIDURAL; INFILTRATION; INTRACAUDAL; PERINEURAL ONCE
Status: DISCONTINUED | OUTPATIENT
Start: 2024-01-10 | End: 2024-01-10 | Stop reason: HOSPADM

## 2024-01-10 RX ORDER — GADOBUTROL 604.72 MG/ML
7 INJECTION INTRAVENOUS
Status: COMPLETED | OUTPATIENT
Start: 2024-01-10 | End: 2024-01-10

## 2024-01-10 RX ORDER — PROPOFOL 10 MG/ML
VIAL (ML) INTRAVENOUS CONTINUOUS PRN
Status: DISCONTINUED | OUTPATIENT
Start: 2024-01-10 | End: 2024-01-10

## 2024-01-10 RX ORDER — LIDOCAINE HYDROCHLORIDE 20 MG/ML
INJECTION, SOLUTION EPIDURAL; INFILTRATION; INTRACAUDAL; PERINEURAL
Status: DISCONTINUED | OUTPATIENT
Start: 2024-01-10 | End: 2024-01-10

## 2024-01-10 RX ADMIN — SODIUM CHLORIDE, SODIUM LACTATE, POTASSIUM CHLORIDE, AND CALCIUM CHLORIDE: .6; .31; .03; .02 INJECTION, SOLUTION INTRAVENOUS at 01:01

## 2024-01-10 RX ADMIN — MIDAZOLAM HYDROCHLORIDE 2 MG: 1 INJECTION, SOLUTION INTRAMUSCULAR; INTRAVENOUS at 01:01

## 2024-01-10 RX ADMIN — LIDOCAINE HYDROCHLORIDE 60 MG: 20 INJECTION, SOLUTION EPIDURAL; INFILTRATION; INTRACAUDAL; PERINEURAL at 01:01

## 2024-01-10 RX ADMIN — GADOBUTROL 7 ML: 604.72 INJECTION INTRAVENOUS at 02:01

## 2024-01-10 RX ADMIN — PROPOFOL 100 MCG/KG/MIN: 10 INJECTION, EMULSION INTRAVENOUS at 01:01

## 2024-01-10 NOTE — ANESTHESIA POSTPROCEDURE EVALUATION
Anesthesia Discharge Summary    Admit Date: 1/10/2024    Discharge Date and Time: No discharge date for patient encounter.    Attending Physician:  Clay Figueroa MD    Discharge Provider:  Clay Figueroa MD    Active Problems:   Patient Active Problem List   Diagnosis    Osteoarthritis involving multiple joints on both sides of body    GERD (gastroesophageal reflux disease)    Gastroparesis    Steroid-induced osteoporosis    Thyroid nodule    Hyperlipidemia    Mild intermittent asthma without complication    Rheumatoid arthritis involving multiple sites    Iron deficiency anemia due to chronic blood loss    Sjogren's syndrome    Candidal esophagitis    Coronary artery disease involving native coronary artery of native heart without angina pectoris    Subclinical hyperthyroidism    Fibromyalgia    Dry eyes    Ureterocele    Urge urinary incontinence    Elevated serum GGT level    Spinal stenosis, lumbar region without neurogenic claudication    History of methotrexate therapy    Primary osteoarthritis, left ankle and foot    Fatty liver    Hormone replacement therapy (HRT)    Chronic pain    Neuropathic pain of left foot    Impaired gait and mobility    Lumbar stenosis    Lower extremity edema    Hypokalemia    Cutaneous abscess of right knee    Long term (current) use of inhaled steroids    History of falling    Encounter for other orthopedic aftercare    Arthrodesis status    Anemia, unspecified    Age-related osteoporosis without current pathological fracture    Long-term use of high-risk medication        Discharged Condition: good    Reason for Admission: MRI    Hospital Course: Patient tolerate procedure and anesthesia well. Test performed without complication.    Consults: none    Significant Diagnostic Studies: None    Treatments/Procedures: Procedure(s) (LRB): anesthesia for exam    Disposition: Home or Self Care    Patient Instructions:   Current Discharge Medication List        CONTINUE these medications  which have NOT CHANGED    Details   amoxicillin-clavulanate 875-125mg (AUGMENTIN) 875-125 mg per tablet Take 1 tablet by mouth every 12 hours  Qty: 20 tablet, Refills: 0      budesonide-formoterol 160-4.5 mcg (SYMBICORT) 160-4.5 mcg/actuation HFAA Inhale 2 puffs into the lungs every 12 (twelve) hours.  Qty: 30.6 g, Refills: 3    Associated Diagnoses: Chronic asthma, mild intermittent, uncomplicated      calcium citrate-vitamin D3 315-200 mg (CITRACAL+D) 315 mg-5 mcg (200 unit) per tablet Take 1 tablet by mouth 2 (two) times daily.       cycloSPORINE (RESTASIS) 0.05 % ophthalmic emulsion PLACE 1 DROP INTO BOTH EYES TWICE A DAY  Qty: 180 each, Refills: 3    Associated Diagnoses: Bilateral dry eyes      diclofenac sodium (VOLTAREN) 1 % Gel APPLY 2 GRAMS TOPICALLY 4 (FOUR) TIMES DAILY AS NEEDED.  Qty: 300 g, Refills: 2    Associated Diagnoses: Osteoarthritis, unspecified osteoarthritis type, unspecified site      !! fluconazole (DIFLUCAN) 100 MG tablet Take 1 tablet by mouth once for 1 dose  Qty: 5 tablet, Refills: 0      !! fluconazole (DIFLUCAN) 150 MG Tab Take  1 tablet by mouth now  Qty: 1 tablet, Refills: 1      ibuprofen (ADVIL,MOTRIN) 800 MG tablet Take 1 tablet by mouth every 6 to 8 hours as needed for pain  Qty: 30 tablet, Refills: 0      leflunomide (ARAVA) 20 MG Tab Take 1 tablet (20 mg total) by mouth once daily.  Qty: 90 tablet, Refills: 0    Associated Diagnoses: Rheumatoid arthritis      methenamine (HIPREX) 1 gram Tab Take 1 tablet (1 g total) by mouth 2 (two) times daily.  Qty: 180 tablet, Refills: 3    Associated Diagnoses: History of recurrent UTIs      !! methocarbamoL (ROBAXIN) 750 MG Tab Take 1 tablet (750 mg total) by mouth 4 (four) times daily.  Qty: 120 tablet, Refills: 0    Associated Diagnoses: Lumbar radiculopathy      !! methocarbamoL (ROBAXIN) 750 MG Tab Take 1 tablet (750 mg total) by mouth 3 (three) times daily as needed.  Qty: 90 tablet, Refills: 1      mirabegron (MYRBETRIQ) 25 mg Tb24  ER tablet Take 1 tablet (25 mg total) by mouth once daily.  Qty: 90 tablet, Refills: 3    Associated Diagnoses: Urinary urgency; Urge urinary incontinence      montelukast (SINGULAIR) 10 mg tablet Take 1 tablet (10 mg total) by mouth every evening.  Qty: 90 tablet, Refills: 3    Associated Diagnoses: Perennial allergic rhinitis      naproxen (NAPROSYN) 500 MG tablet TAKE 1 TABLET (500 MG TOTAL) BY MOUTH 2 (TWO) TIMES DAILY AS NEEDED  Qty: 180 tablet, Refills: 1      omeprazole (PRILOSEC) 40 MG capsule Take 1 capsule (40 mg total) by mouth every morning.  Qty: 30 capsule, Refills: 6      oxyCODONE-acetaminophen (PERCOCET) 5-325 mg per tablet Take 1 tablet by mouth every 6 (six) hours as needed for Pain.  Qty: 120 tablet, Refills: 0    Comments: Quantity prescribed more than 7 day supply? Yes, quantity medically necessary  Associated Diagnoses: S/P spinal surgery      POTASSIUM ORAL Take by mouth once daily.      predniSONE (DELTASONE) 5 MG tablet TAKE 1 TABLET BY MOUTH EVERY DAY  Qty: 90 tablet, Refills: 1      pregabalin (LYRICA) 150 MG capsule Take 1 capsule (150 mg total) by mouth 3 (three) times daily.  Qty: 90 capsule, Refills: 2    Associated Diagnoses: Fibromyalgia      rosuvastatin (CRESTOR) 20 MG tablet Take 1 tablet by mouth every day in the evening  Qty: 90 tablet, Refills: 3      sucralfate (CARAFATE) 1 gram tablet TAKE 1 TABLET (1 G TOTAL) BY MOUTH 4 (FOUR) TIMES DAILY.  Qty: 360 tablet, Refills: 2    Associated Diagnoses: Gastroparesis      albuterol (PROVENTIL) 2.5 mg /3 mL (0.083 %) nebulizer solution Take 3 mLs (2.5 mg total) by nebulization every 6 (six) hours as needed for Wheezing. Rescue  Qty: 75 mL, Refills: 0    Associated Diagnoses: Sinusitis, unspecified chronicity, unspecified location; Mild intermittent asthma without complication      albuterol (PROVENTIL/VENTOLIN HFA) 90 mcg/actuation inhaler Inhale 2 puffs into the lungs every 6 (six) hours as needed. Rescue  Qty: 20.1 g, Refills: 11       albuterol-ipratropium (DUO-NEB) 2.5 mg-0.5 mg/3 mL nebulizer solution Take 3 mLs by nebulization every 6 (six) hours as needed for Wheezing. Rescue  Qty: 90 mL, Refills: 3    Associated Diagnoses: Moderate persistent asthma with acute exacerbation; Bronchitis      azelastine (ASTELIN) 137 mcg (0.1 %) nasal spray Use 1 spray (137 mcg total) in each nostril 2 (two) times daily.  Qty: 30 mL, Refills: 1      azithromycin (Z-DEVON) 250 MG tablet Take 2 tablets by mouth on day 1, then take 1 tablet daily until all taken  Qty: 6 tablet, Refills: 0      chlorhexidine (PERIDEX) 0.12 % solution Rinse with 2 teaspoons (10 ml's) by mouth three times daily as directed  Qty: 473 mL, Refills: 0      !! fluconazole (DIFLUCAN) 150 MG Tab Take 150 mg by mouth as needed.      fluticasone propionate (FLONASE) 50 mcg/actuation nasal spray Use 1 spray (50 mcg total) in each nostril once daily.  Qty: 16 g, Refills: 1      INV PROPULSID 10 MG Take 2 tablets (20 mg) by mouth 4 (four) times daily. FOR INVESTIGATIONAL USE ONLY. Protocol [id number]-154 Subject ID: [id number].  Qty: 1440 each, Refills: 0    Associated Diagnoses: Patient in clinical research study; Gastroparesis      lifitegrast (XIIDRA) 5 % Dpet INSTILL ONE DROP INTO BOTH EYES TWICE A DAY  Qty: 60 mL, Refills: 10      magic mouthwash diphen/antac/lidoc rinse mouth with 5 ml for 30 seconds and spit out, twice daily  Qty: 150 mL, Refills: 2      omeprazole-sodium bicarbonate (ZEGERID) 40-1.1 mg-gram per capsule TAKE 1 CAPSULE BY MOUTH EVERY DAY IN THE MORNING  Qty: 90 capsule, Refills: 3      promethazine (PHENERGAN) 25 MG tablet Take 1 tablet (25 mg total) by mouth every 6 (six) hours as needed for Nausea.  Qty: 15 tablet, Refills: 0    Comments: Bedside delivery [facility] 1/11/23 with Dr. Sivan REA) 200 mg/1.14 mL Syrg Inject 1.14 mL (200 mg total) into the skin every 14 (fourteen) days.  Qty: 2.28 mL, Refills: 1    Associated Diagnoses: Rheumatoid arthritis,  "involving unspecified site, unspecified whether rheumatoid factor present      triamcinolone acetonide 0.1% (KENALOG) 0.1 % paste Place onto teeth 2 (two) times daily. Apply to oral ulcers twice daily until resolved  Qty: 5 g, Refills: 1       !! - Potential duplicate medications found. Please discuss with provider.            Discharge Procedure Orders (must include Diet, Follow-up, Activity)  No discharge procedures on file.     Discharge instructions - Please return to clinic (contact pediatrician etc..) if:  1) Persistent cough.  2) Respiratory difficulty (including: noisy breathing, nasal flaring, "barky" cough or wheezing).  3) Persistent pain not responsive to prescribed medications (if any).  4) Change in current mental status (age appropriate).  5) Repeating or recurrent episodes of vomiting.  6) Inability to tolerate oral fluids.      Anesthesia Post Evaluation    Patient: Joyce GUALLPA Kristen    Procedure(s) Performed: Procedure(s) (LRB):  MRI (Magnetic Resonance Imagine) (N/A)    Final Anesthesia Type: general      Patient location during evaluation: PACU  Patient participation: Yes- Able to Participate  Level of consciousness: awake and alert  Post-procedure vital signs: reviewed and stable  Pain management: adequate  Airway patency: patent  MARCE mitigation strategies: Multimodal analgesia  PONV status at discharge: No PONV  Anesthetic complications: no      Cardiovascular status: blood pressure returned to baseline and hemodynamically stable  Respiratory status: unassisted  Hydration status: euvolemic  Follow-up not needed.              Vitals Value Taken Time   /83 01/10/24 1446   Temp 36.1 °C (97 °F) 01/10/24 1424   Pulse 68 01/10/24 1447   Resp 23 01/10/24 1447   SpO2 97 % 01/10/24 1447   Vitals shown include unvalidated device data.      No case tracking events are documented in the log.      Pain/Isacc Score: Isacc Score: 10 (1/10/2024  2:30 PM)          "

## 2024-01-10 NOTE — ANESTHESIA PREPROCEDURE EVALUATION
01/10/2024  Pre-operative evaluation for Procedure(s) (LRB):  MRI (Magnetic Resonance Imagine) (N/A)    Joyce Peterson is a 63 y.o. female w/ PMHx of GERD, RA, fibromyalgia, asthma chronic pain, L4/5 TLIF 6/2023 still with pain    Patient Active Problem List   Diagnosis    Osteoarthritis involving multiple joints on both sides of body    GERD (gastroesophageal reflux disease)    Gastroparesis    Steroid-induced osteoporosis    Thyroid nodule    Hyperlipidemia    Mild intermittent asthma without complication    Rheumatoid arthritis involving multiple sites    Iron deficiency anemia due to chronic blood loss    Sjogren's syndrome    Candidal esophagitis    Coronary artery disease involving native coronary artery of native heart without angina pectoris    Subclinical hyperthyroidism    Fibromyalgia    Dry eyes    Ureterocele    Urge urinary incontinence    Elevated serum GGT level    Spinal stenosis, lumbar region without neurogenic claudication    History of methotrexate therapy    Primary osteoarthritis, left ankle and foot    Fatty liver    Hormone replacement therapy (HRT)    Chronic pain    Neuropathic pain of left foot    Impaired gait and mobility    Lumbar stenosis    Lower extremity edema    Hypokalemia    Cutaneous abscess of right knee    Long term (current) use of inhaled steroids    History of falling    Encounter for other orthopedic aftercare    Arthrodesis status    Anemia, unspecified    Age-related osteoporosis without current pathological fracture    Long-term use of high-risk medication       Review of patient's allergies indicates:   Allergen Reactions    Actemra [tocilizumab] Other (See Comments)     Severe dizziness    Codeine Nausea And Vomiting and Hives    Gold au 198 Hives and Rash    Hydroxychloroquine Other (See Comments)     Can't remember the reaction      Iodinated contrast  "media Other (See Comments)     Other reaction(s): BURNING ALL OVER    Iodine Other (See Comments) and Hives     Other reaction(s): BURNING ALL OVER - Iodine dye - Not topical    Ondansetron Nausea And Vomiting    Sulfa (sulfonamide antibiotics) Other (See Comments)     Can't remember the reaction    Zofran [ondansetron hcl (pf)] Nausea And Vomiting     Pt reports that last time she received zofran she started vomiting again    Methotrexate analogues Nausea Only    Pneumococcal vaccine Other (See Comments)    Pneumovax 23 [pneumococcal 23-argenis ps vaccine] Other (See Comments)     "sick"    Methotrexate Nausea Only       No current facility-administered medications on file prior to encounter.     Current Outpatient Medications on File Prior to Encounter   Medication Sig Dispense Refill    predniSONE (DELTASONE) 5 MG tablet TAKE 1 TABLET BY MOUTH EVERY DAY (Patient taking differently: Take 6 mg by mouth once daily.) 90 tablet 1    albuterol (PROVENTIL/VENTOLIN HFA) 90 mcg/actuation inhaler Inhale 2 puffs into the lungs every 6 (six) hours as needed. Rescue 20.1 g 11    albuterol-ipratropium (DUO-NEB) 2.5 mg-0.5 mg/3 mL nebulizer solution Take 3 mLs by nebulization every 6 (six) hours as needed for Wheezing. Rescue 90 mL 3    azelastine (ASTELIN) 137 mcg (0.1 %) nasal spray Use 1 spray (137 mcg total) in each nostril 2 (two) times daily. (Patient taking differently: 1 spray by Nasal route 2 (two) times daily as needed.) 30 mL 1    budesonide-formoterol 160-4.5 mcg (SYMBICORT) 160-4.5 mcg/actuation HFAA Inhale 2 puffs into the lungs every 12 (twelve) hours. 30.6 g 3    calcium citrate-vitamin D3 315-200 mg (CITRACAL+D) 315 mg-5 mcg (200 unit) per tablet Take 1 tablet by mouth 2 (two) times daily.       cycloSPORINE (RESTASIS) 0.05 % ophthalmic emulsion PLACE 1 DROP INTO BOTH EYES TWICE A  each 3    diclofenac sodium (VOLTAREN) 1 % Gel APPLY 2 GRAMS TOPICALLY 4 (FOUR) TIMES DAILY AS NEEDED. 300 g 2    fluconazole " (DIFLUCAN) 150 MG Tab Take 150 mg by mouth as needed.      fluticasone propionate (FLONASE) 50 mcg/actuation nasal spray Use 1 spray (50 mcg total) in each nostril once daily. 16 g 1    INV PROPULSID 10 MG Take 2 tablets (20 mg) by mouth 4 (four) times daily. FOR INVESTIGATIONAL USE ONLY. Protocol [id number] Subject ID: [id number]. 1440 each 0    leflunomide (ARAVA) 20 MG Tab Take 1 tablet (20 mg total) by mouth once daily. (Patient not taking: Reported on 1/9/2024) 90 tablet 0    lifitegrast (XIIDRA) 5 % Dpet INSTILL ONE DROP INTO BOTH EYES TWICE A DAY 60 mL 10    methenamine (HIPREX) 1 gram Tab Take 1 tablet (1 g total) by mouth 2 (two) times daily. 180 tablet 3    methocarbamoL (ROBAXIN) 750 MG Tab Take 1 tablet (750 mg total) by mouth 4 (four) times daily. 120 tablet 0    mirabegron (MYRBETRIQ) 25 mg Tb24 ER tablet Take 1 tablet (25 mg total) by mouth once daily. 90 tablet 3    montelukast (SINGULAIR) 10 mg tablet Take 1 tablet (10 mg total) by mouth every evening. 90 tablet 3    naproxen (NAPROSYN) 500 MG tablet TAKE 1 TABLET (500 MG TOTAL) BY MOUTH 2 (TWO) TIMES DAILY AS NEEDED 180 tablet 1    omeprazole (PRILOSEC) 40 MG capsule Take 1 capsule (40 mg total) by mouth every morning. (Patient not taking: Reported on 1/9/2024) 30 capsule 6    omeprazole-sodium bicarbonate (ZEGERID) 40-1.1 mg-gram per capsule TAKE 1 CAPSULE BY MOUTH EVERY DAY IN THE MORNING 90 capsule 3    POTASSIUM ORAL Take by mouth once daily.      pregabalin (LYRICA) 150 MG capsule Take 1 capsule (150 mg total) by mouth 3 (three) times daily. 90 capsule 2    promethazine (PHENERGAN) 25 MG tablet Take 1 tablet (25 mg total) by mouth every 6 (six) hours as needed for Nausea. (Patient not taking: Reported on 1/9/2024) 15 tablet 0    rosuvastatin (CRESTOR) 20 MG tablet Take 1 tablet by mouth every day in the evening 90 tablet 3    sarilumab (KEVZARA) 200 mg/1.14 mL Syrg Inject 1.14 mL (200 mg total) into the skin every 14 (fourteen) days. (Patient not  taking: Reported on 1/9/2024) 2.28 mL 1    sucralfate (CARAFATE) 1 gram tablet TAKE 1 TABLET (1 G TOTAL) BY MOUTH 4 (FOUR) TIMES DAILY. 360 tablet 2    triamcinolone acetonide 0.1% (KENALOG) 0.1 % paste Place onto teeth 2 (two) times daily. Apply to oral ulcers twice daily until resolved 5 g 1       Past Surgical History:   Procedure Laterality Date    BREAST BIOPSY Left 01/29/2002    core bx    CARPAL TUNNEL RELEASE Right 05/2017    CHOLECYSTECTOMY  2004    COLONOSCOPY      10/11    COLONOSCOPY N/A 6/29/2017    Procedure: COLONOSCOPY;  Surgeon: López Moore MD;  Location: [facility] ENDO (4TH FLR);  Service: Endoscopy;  Laterality: N/A;    EPIDURAL STEROID INJECTION N/A 11/18/2021    Procedure: INJECTION, STEROID, EPIDURAL, L5-S1 IL NEED CONSENT;  Surgeon: Tj Mayfield MD;  Location: [facility] PAIN MGT;  Service: Pain Management;  Laterality: N/A;    EPIDURAL STEROID INJECTION N/A 9/29/2022    Procedure: LUMBAR L3/L4 IL MADELINE CONTRAST;  Surgeon: Tj Mayfield MD;  Location: [facility] PAIN MGT;  Service: Pain Management;  Laterality: N/A;    EPIDURAL STEROID INJECTION N/A 12/12/2022    Procedure: INJECTION, STEROID, EPIDURAL L5/S1 IL CONTRAST;  Surgeon: Tj Mayfield MD;  Location: [facility] PAIN MGT;  Service: Pain Management;  Laterality: N/A;    EPIDURAL STEROID INJECTION N/A 4/6/2023    Procedure: INJECTION, STEROID, EPIDURAL L5/S1;  Surgeon: Tj Mayfield MD;  Location: [facility] PAIN MGT;  Service: Pain Management;  Laterality: N/A;    FOOT ARTHRODESIS Left 1/11/2023    Procedure: FUSION, FOOT;  Surgeon: Ariella Finnegan MD;  Location: [facility] OR;  Service: Orthopedics;  Laterality: Left;  nimbus    FUSION, SPINE, LUMBAR, TLIF, POSTERIOR APPROACH, USING PEDICLE SCREW N/A 6/22/2023    Procedure: FUSION, SPINE, LUMBAR, TLIF, POSTERIOR APPROACH, USING PEDICLE SCREW L4/5 spinewave SNS:SSEP/EMG;  Surgeon: Jh Mosqueda MD;  Location: [facility] OR 2ND FLR;  Service: Neurosurgery;  Laterality: N/A;    HARVESTING OF BONE GRAFT Left 1/11/2023     Procedure: SURGICAL PROCUREMENT, BONE GRAFT;  Surgeon: Ariella Finnegan MD;  Location: [facility] OR;  Service: Orthopedics;  Laterality: Left;    INJECTION OF ANESTHETIC AGENT AROUND NERVE Bilateral 8/23/2021    Procedure: BLOCK, NERVE, MEDIAL BRANCH L3,L4,L5;  Surgeon: Tj Mayfield MD;  Location: [facility] PAIN MGT;  Service: Pain Management;  Laterality: Bilateral;  1 of 2    INJECTION OF ANESTHETIC AGENT AROUND NERVE Bilateral 8/26/2021    Procedure: BLOCK, NERVE, MEDIAL BRANCH L3,L4,L5;  Surgeon: Tj Mayfield MD;  Location: [facility] PAIN MGT;  Service: Pain Management;  Laterality: Bilateral;  2 of 2    INJECTION OF ANESTHETIC AGENT AROUND NERVE Left 7/7/2022    Procedure: BLOCK, NERVE, LEFT OBTURATOR AND FEMORAL;  Surgeon: Tj Mayfield MD;  Location: [facility] PAIN MGT;  Service: Pain Management;  Laterality: Left;    INJECTION OF FACET JOINT Bilateral 3/12/2020    Procedure: FACET JOINT INJECTION (LUMBAR BLOCK) BILATERAL L4-5 AND L5-S1 DIRECT REFERRAL;  Surgeon: Tj Mayfield MD;  Location: [facility] PAIN MGT;  Service: Pain Management;  Laterality: Bilateral;  NEEDS CONSENT    INJECTION OF FACET JOINT Bilateral 3/29/2021    Procedure: INJECTION, FACET JOINT L3/4, L4/5, L5/S1;  Surgeon: Tj Mayfield MD;  Location: [facility] PAIN MGT;  Service: Pain Management;  Laterality: Bilateral;    INJECTION OF JOINT Right 7/30/2020    Procedure: INJECTION, JOINT, RIGHT HIP Interarticular under flouro;  Surgeon: Tj Mayfield MD;  Location: [facility] PAIN MGT;  Service: Pain Management;  Laterality: Right;  INJECTION, JOINT, RIGHT HIP Interarticular under flouro    INJECTION OF JOINT Right 2/21/2022    Procedure: Injection, Joint RIGHT ISCHIAL BURSA;  Surgeon: Tj Mayfield MD;  Location: [facility] PAIN MGT;  Service: Pain Management;  Laterality: Right;    INTRAMEDULLARY RODDING OF FEMUR Left 5/21/2019    Procedure: INSERTION, INTRAMEDULLARY ARIEL, FEMUR;  Surgeon: López uDff MD;  [facility];  Service: Orthopedics;  Laterality: Left;     LENGTHENING OF TENDON OF LOWER EXTREMITY Left 1/11/2023    Procedure: LENGTHENING, TENDON, LOWER EXTREMITY;  Surgeon: Ariella Finnegan MD;  Location: [facility] OR;  Service: Orthopedics;  Laterality: Left;    MAGNETIC RESONANCE IMAGING N/A 5/29/2023    Procedure: MRI (Magnetic Resonance Imagine);  Surgeon: Sujey Surgeon;  Location: [facility];  Service: Anesthesiology;  Laterality: N/A;    RADIOFREQUENCY ABLATION Left 9/20/2021    Procedure: RADIOFREQUENCY ABLATION left L3,4,5 RFA  1 of 2  CONSENT NEEDED;  Surgeon: Tj Mayfield MD;  Location: [facility] PAIN MGT;  Service: Pain Management;  Laterality: Left;    RADIOFREQUENCY ABLATION Right 10/4/2021    Procedure: RADIOFREQUENCY ABLATION  right L3,4,5 RFA   2 of 2  CONSENT NEEDED;  Surgeon: Tj Mayfield MD;  Location: [facility] PAIN MGT;  Service: Pain Management;  Laterality: Right;    RADIOFREQUENCY ABLATION Left 4/21/2022    Procedure: Radiofrequency Ablation LEFT L3,L4,L5;  Surgeon: Tj Mayfield MD;  Location: [facility] PAIN MGT;  Service: Pain Management;  Laterality: Left;    RADIOFREQUENCY ABLATION Right 5/12/2022    Procedure: Radiofrequency Ablation RIGHT L3,L4,L5;  Surgeon: Tj Mayfield MD;  Location: [facility] PAIN MGT;  Service: Pain Management;  Laterality: Right;    RADIOFREQUENCY ABLATION Left 7/25/2022    Procedure: Radiofrequency Ablation Left Femoral & Obturator;  Surgeon: Tj Mayfield MD;  Location: [facility] PAIN MGT;  Service: Pain Management;  Laterality: Left;    REPAIR OF TRICEPS TENDON Left 10/17/2018    Procedure: REPAIR, TENDON, TRICEPS left elbow;  Surgeon: Staci Yarbrough MD;  [address]  Service: Orthopedics;  Laterality: Left;  Anesthesia: General and regional. PRONE, k-wire , hand pan 1 and pan 2, CALL ARTHREX/Tamera notified 10-12 LO    TONSILLECTOMY      TRANSFORAMINAL EPIDURAL INJECTION OF STEROID Right 8/31/2020    Procedure: INJECTION, STEROID, EPIDURAL, TRANSFORAMINAL,  APPROACH, L3-L4 and L4-L5 need consent;  Surgeon: Tj Mayfield  MD;  Location: [facility] PAIN MGT;  Service: Pain Management;  Laterality: Right;    TRANSFORAMINAL EPIDURAL INJECTION OF STEROID Bilateral 7/23/2021    Procedure: INJECTION, STEROID, EPIDURAL, TRANSFORAMINAL APPROACH L4/5;  Surgeon: Tj Mayfield MD;  Location: [facility] PAIN MGT;  Service: Pain Management;  Laterality: Bilateral;    TRANSFORAMINAL EPIDURAL INJECTION OF STEROID Bilateral 9/25/2023    Procedure: LUMBAR TRANSFORAMINAL BILATERAL  L2/3 DIRECT REFERRAL;  Surgeon: Tj Mayfield MD;  Location: [facility] PAIN MGT;  Service: Pain Management;  Laterality: Bilateral;    TRANSFORAMINAL EPIDURAL INJECTION OF STEROID Left 10/18/2023    Procedure: LUMBAR TRANSFORAMINAL LEFT L3/4 AND L4/5 * CLEARANCE IN CHART*;  Surgeon: Tj Mayfield MD;  Location: [facility] PAIN MGT;  Service: Pain Management;  Laterality: Left;    TRIGGER POINT INJECTION N/A 7/23/2021    Procedure: INJECTION, TRIGGER POINT SCAPULAR;  Surgeon: Tj Mayfield MD;  Location: [facility] PAIN MGT;  Service: Pain Management;  Laterality: N/A;    TUBAL LIGATION  2003    UPPER GASTROINTESTINAL ENDOSCOPY      10/11    uterine ablation  2003       Social History     Socioeconomic History    Marital status:    Tobacco Use    Smoking status: Never    Smokeless tobacco: Never   Substance and Sexual Activity    Alcohol use: Yes     Comment: 2-3 times per year.    Drug use: No    Sexual activity: Yes     Partners: Male     Birth control/protection: Surgical     Social Determinants of Health     Financial Resource Strain: Patient Declined (12/5/2023)    Overall Financial Resource Strain (CARDIA)     Difficulty of Paying Living Expenses: Patient declined   Food Insecurity: Patient Declined (12/5/2023)    Hunger Vital Sign     Worried About Running Out of Food in the Last Year: Patient declined     Ran Out of Food in the Last Year: Patient declined   Transportation Needs: Patient Declined (12/5/2023)    PRAPARE - Transportation     Lack of Transportation (Medical): Patient  "declined     Lack of Transportation (Non-Medical): Patient declined   Physical Activity: Unknown (2023)    Exercise Vital Sign     Days of Exercise per Week: Patient declined     Minutes of Exercise per Session: 0 min   Recent Concern: Physical Activity - Inactive (2023)    Exercise Vital Sign     Days of Exercise per Week: 0 days     Minutes of Exercise per Session: 0 min   Stress: Patient Declined (2023)    [employer] - Occupational Stress Questionnaire     Feeling of Stress : Patient declined   Social Connections: Unknown (2023)    Social Connection and Isolation Panel [NHANES]     Frequency of Communication with Friends and Family: Patient declined     Frequency of Social Gatherings with Friends and Family: Patient declined     Attends Tenriism Services: More than 4 times per year     Active Member of Clubs or Organizations: Patient declined     Attends Club or Organization Meetings: Patient declined     Marital Status: Patient declined   Housing Stability: Unknown (2023)    Housing Stability Vital Sign     Unable to Pay for Housing in the Last Year: Patient refused     Number of Places Lived in the Last Year: 1     Unstable Housing in the Last Year: No         CBC: No results for input(s): "WBC", "RBC", "HGB", "HCT", "PLT", "MCV", "MCH", "MCHC" in the last 72 hours.    CMP: No results for input(s): "NA", "K", "CL", "CO2", "BUN", "CREATININE", "GLU", "MG", "PHOS", "CALCIUM", "ALBUMIN", "PROT", "ALKPHOS", "ALT", "AST", "BILITOT" in the last 72 hours.    INR  No results for input(s): "PT", "INR", "PROTIME", "APTT" in the last 72 hours.        Diagnostic Studies:      EKD Echo:  No results found. However, due to the size of the patient record, not all encounters were searched. Please check Results Review for a complete set of results.     Pre-op Assessment    I have reviewed the Patient Summary Reports.     I have reviewed the Nursing Notes. I have " reviewed the NPO Status.   I have reviewed the Medications.     Review of Systems  Cardiovascular:        CAD                                        Pulmonary:    Asthma                    Hepatic/GI:     GERD             Musculoskeletal:  Arthritis                   Physical Exam  General: Well nourished and Cooperative    Airway:  Mallampati: II   Mouth Opening: Normal  TM Distance: Normal  Tongue: Normal  Neck ROM: Normal ROM    Chest/Lungs:  Clear to auscultation, Normal Respiratory Rate    Heart:  Rate: Normal  Rhythm: Regular Rhythm  Sounds: Normal        Anesthesia Plan  Type of Anesthesia, risks & benefits discussed:    Anesthesia Type: Gen ETT, Gen Supraglottic Airway, Gen Natural Airway  Intra-op Monitoring Plan: Standard ASA Monitors  Post Op Pain Control Plan: multimodal analgesia and IV/PO Opioids PRN  Induction:  IV  Airway Plan: Direct and Video, Post-Induction  Informed Consent: Informed consent signed with the Patient and all parties understand the risks and agree with anesthesia plan.  All questions answered.   ASA Score: 3  Day of Surgery Review of History & Physical: H&P completed by Anesthesiologist.    Ready For Surgery From Anesthesia Perspective.     .

## 2024-01-10 NOTE — PLAN OF CARE
Pt alert and orientated. Spouse at bs. VSS. No distress noted. Pt denies N/V/D. Pt states they are ready for discharge.

## 2024-01-10 NOTE — PROGRESS NOTES
PreOP completed. Admit order, surgery consent, anesthesia consent, and H&P needed. Belongings placed in the locker.

## 2024-01-10 NOTE — TRANSFER OF CARE
"Anesthesia Transfer of Care Note    Patient: Joyce Peterson    Procedure(s) Performed: Procedure(s) (LRB):  MRI (Magnetic Resonance Imagine) (N/A)    Patient location: PACU    Anesthesia Type: general    Transport from OR: Transported from OR on 2-3 L/min O2 by NC with adequate spontaneous ventilation    Post pain: adequate analgesia    Post assessment: no apparent anesthetic complications    Post vital signs: stable    Level of consciousness: sedated    Nausea/Vomiting: no nausea/vomiting    Complications: none    Transfer of care protocol was followed      Last vitals: Visit Vitals  /67 (BP Location: Left arm, Patient Position: Lying)   Pulse 76   Temp 36.8 °C (98.2 °F) (Temporal)   Resp 17   Ht 5' 1" (1.549 m)   Wt 61.7 kg (136 lb)   LMP  (LMP Unknown)   SpO2 98%   Breastfeeding No   BMI 25.70 kg/m²     "

## 2024-01-11 ENCOUNTER — PATIENT MESSAGE (OUTPATIENT)
Dept: ENDOCRINOLOGY | Facility: CLINIC | Age: 64
End: 2024-01-11

## 2024-01-11 ENCOUNTER — PATIENT MESSAGE (OUTPATIENT)
Dept: ORTHOPEDICS | Facility: CLINIC | Age: 64
End: 2024-01-11
Payer: MEDICARE

## 2024-01-11 ENCOUNTER — OFFICE VISIT (OUTPATIENT)
Dept: ENDOCRINOLOGY | Facility: CLINIC | Age: 64
End: 2024-01-11
Payer: MEDICARE

## 2024-01-11 VITALS
HEIGHT: 61 IN | SYSTOLIC BLOOD PRESSURE: 118 MMHG | BODY MASS INDEX: 27.01 KG/M2 | WEIGHT: 143.06 LBS | DIASTOLIC BLOOD PRESSURE: 78 MMHG

## 2024-01-11 DIAGNOSIS — M87.180 OSTEONECROSIS OF JAW DUE TO DRUG: ICD-10-CM

## 2024-01-11 DIAGNOSIS — T38.0X5A STEROID-INDUCED OSTEOPOROSIS: Primary | Chronic | ICD-10-CM

## 2024-01-11 DIAGNOSIS — M81.8 STEROID-INDUCED OSTEOPOROSIS: Primary | Chronic | ICD-10-CM

## 2024-01-11 DIAGNOSIS — M06.9 RHEUMATOID ARTHRITIS INVOLVING MULTIPLE SITES, UNSPECIFIED WHETHER RHEUMATOID FACTOR PRESENT: Chronic | ICD-10-CM

## 2024-01-11 DIAGNOSIS — E05.90 SUBCLINICAL HYPERTHYROIDISM: ICD-10-CM

## 2024-01-11 DIAGNOSIS — E04.1 THYROID NODULE: Chronic | ICD-10-CM

## 2024-01-11 PROCEDURE — 99215 OFFICE O/P EST HI 40 MIN: CPT | Mod: S$GLB,,, | Performed by: INTERNAL MEDICINE

## 2024-01-11 PROCEDURE — 1159F MED LIST DOCD IN RCRD: CPT | Mod: CPTII,S$GLB,, | Performed by: INTERNAL MEDICINE

## 2024-01-11 PROCEDURE — 3078F DIAST BP <80 MM HG: CPT | Mod: CPTII,S$GLB,, | Performed by: INTERNAL MEDICINE

## 2024-01-11 PROCEDURE — 99999 PR PBB SHADOW E&M-EST. PATIENT-LVL IV: CPT | Mod: PBBFAC,,, | Performed by: INTERNAL MEDICINE

## 2024-01-11 PROCEDURE — 3008F BODY MASS INDEX DOCD: CPT | Mod: CPTII,S$GLB,, | Performed by: INTERNAL MEDICINE

## 2024-01-11 PROCEDURE — 3074F SYST BP LT 130 MM HG: CPT | Mod: CPTII,S$GLB,, | Performed by: INTERNAL MEDICINE

## 2024-01-11 RX ORDER — TERIPARATIDE 250 UG/ML
20 INJECTION, SOLUTION SUBCUTANEOUS DAILY
Qty: 2.4 ML | Refills: 11 | Status: ACTIVE | OUTPATIENT
Start: 2024-01-11 | End: 2025-01-10

## 2024-01-11 RX ORDER — PEN NEEDLE, DIABETIC 31 GX5/16"
NEEDLE, DISPOSABLE MISCELLANEOUS
Qty: 100 EACH | Refills: 3 | Status: ACTIVE | OUTPATIENT
Start: 2024-01-11

## 2024-01-11 NOTE — ASSESSMENT & PLAN NOTE
Given the recent diagnosis of MRONJ, will avoid additional anti-resorptive therapy. See above regarding the plan for osteoporosis going forward.

## 2024-01-11 NOTE — Clinical Note
Schedule thyroid ultrasound in radiology. Patient requested an appointment date on the same day as one of her other appointments due to transportation issues. Schedule f/u April 15th at 1PM (use [facility] slot).

## 2024-01-11 NOTE — ASSESSMENT & PLAN NOTE
Due for thyroid U/S. Will schedule in the upcoming weeks. She requested an appointment date to coincide with one of her other visits for transportation reasons.

## 2024-01-11 NOTE — ASSESSMENT & PLAN NOTE
Increases risk for fracture  On chronic corticosteroids, which is her main risk factor for osteoporosis and fractures.  Due for visit with Dr. Moran.

## 2024-01-11 NOTE — ASSESSMENT & PLAN NOTE
Patient has glucocorticoid induced osteoporosis; Fracture risk remains very high. The recent issues with MRONJ further complicates her treatment since we will want to minimize any additional exposure to anti-resorptive agents.     She previously tolerated 2 full years of Forteo. Given our limited options and the presence of active ONJ, I believe resuming Forteo is the most reasonable option.  We discussed side effects including nausea and lightheadedness.  Discussed the increased incidence of bone tumors in rats studies, which fortunately has not been seen in humans.  My plan is to treat for at least the next two years.  We will then assess bone density and bone turnover markers regularly to determine if she will need additional treatment after that.    Continue Vit D + Calcium at current doses.  She inquired about vitamin K2.  It would be okay to start that although there is a paucity of data regarding factor risk prevention with this supplement.  She also inquired about BPC-157. I was not able to find any studies showing clinical benefit in humans.  Therefore I recommended avoiding that one until we have further information.    Check DXA in 12/2024 - need to check more frequently then every 2 years due to high fracture risk and to evaluate efficacy of therapy.

## 2024-01-11 NOTE — PROGRESS NOTES
FOLLOW-UP VISIT    Subjective:      Chief Complaint: Follow-up for osteoporosis    HPI: Joyce Peterson is a 63 y.o. female who is here for a follow-up evaluation for osteoporosis    The patient's last visit with me was on 10/11/2022.      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    Unfortunately, she was recently diagnosed with osteonecrosis of the jaw. She saw a dentist over the summer with complaints of tooth pain. It wasn't her normal dentist and she was told to come back if the pain didn't resolve. It did go away with time initially. Then in early December she started having more severe pain in the right lower side of her jaw associated with a hole that she could feel with her tongue. She saw her dentist who then referred her to an oral surgeon. Based on their exam, she was diagnosed with medication related ONJ. She's been treated with 3 courses of antibiotics and oral rinses. Fortunately, the pain is much improved now after she started the second course of antibiotics. She is understandably upset about this unexpected side effect from Prolia. She is here today to discuss how to proceed with management of her osteoporosis going forward.    Additionally, she had L-spine fusion in 6/22/2023 for severe lumbar radiculopathy down the right leg. That improved, but she's now having more issues on the left side. This is impairing her mobility and she's been dealing with pain on a daily basis for quite a while.    Calcium intake?  Citracal BID  Vit D intake?  Citracal BID  Weight bearing exercise?   no  Recent falls? no  Fall Precautions? no  Height loss (>2 inches)? no    Fracture history:  Has had at least 7 fractures in her feet (2nd-5th metatarsals). Two since being on Prolia.  Stress fracture of left femoral neck in 5/2019 - s/p CM  nailing  April, 2021: Nondisplaced chip or avulsion fracture of the left greater tuberosity of the humerus; sitting on a chair in a boat, the chair broke and she fell backward on her shoulder.    Tobacco use?  no  EtOH use?  Yes; occasional     Current diarrhea or h/o malabsorption? no  Chronic steroids? Yes - has been on chronic corticosteroids for the past 34 years for RA. On and off, but mostly on; current dose is 5 mg daily, but previously on higher doses  Anticoagulants? no   Proton Pump Inhibitors? yes  Antiseizure medications? no   Thyroid disease? Subclinical hyperthyroidism vs. TSH suppression from chronic steroids, but most recent labs are normal  Anemia? no  Kidney Stones? no  Hyperparathyroidism? no    Post-menopausal? Age?: 50s  ERT after menopause?: Yes, remains on E+P    Mom or dad with hip fracture?: No, mom with hip fx     Malignancy involving bone (active or history)? no  Prior radiation treatment? no  Unexplained elevation of alkaline phosphatase? No      DXA (Year)  Location 3/17/2021  ([facility]) 3/8/2022  ([facility]) 12/20/2023  ([facility]) Percent change from previous   L-spine T-score -1.3 -0.5 +0.5 (L1/L2 only)    Total Hip T-score -0.3 -0.3 -0.3 ([facility] since 3/2022)   Femoral Neck T-score -0.9 -0.7 -0.8    FRAX (MOF  /  Hip) 24%  /  1.7% 23%  /  1.6% 25%  /  1.8%          With regards to the thyroid nodule and subclinical hyperthyroidism:    Presents with nodule that was diagnosed by ultrasound    Preexisting thyroid disease?: Intermittent mild TSH suppression in the past, but normal more recently. subclinical hyperthyroidism (TSH normal recently)    Lab Results   Component Value Date    TSH 0.619 10/18/2022    TSH 1.200 11/17/2021    TSH 2.330 07/26/2021    FREET4 0.84 10/18/2022    FREET4 0.88 02/19/2020    FREET4 0.97 08/21/2019    T3FREE 2.6 08/11/2017    T3FREE 2.7 01/07/2008    Q2AGNKK 82 08/21/2019    THYROTROPINR <1.10 11/11/2020    THYROPEROXID <6.0 02/19/2020           Compressive  "symptoms: No    Risk Factors:  Radiation to head or neck for any treatment of cancer or exposure to radiation?: no  Personal history of colon or breast cancer?: no  Tobacco use?: no  Family history of thyroid cancer?: no    Symptoms of hyper or hypothyroidism:  No    Previous biopsy results:  Right nodule: Unsat 2012, then Benign - 2012    Last ultrasound: 5/17/2021  No significant change in any of the nodules.  Planning follow-up ultrasound in May, 2023.      Reviewed past medical, family, social history and updated as appropriate.      Objective:     Vitals:    01/11/24 1317   BP: 118/78     BP Readings from Last 5 Encounters:   01/11/24 118/78   01/10/24 135/75   12/26/23 125/74   12/20/23 138/82   12/15/23 96/64       Physical Exam    Wt Readings from Last 10 Encounters:   01/11/24 1317 64.9 kg (143 lb 1.3 oz)   01/10/24 1232 61.7 kg (136 lb)   12/26/23 1036 61.7 kg (136 lb 0.4 oz)   12/20/23 1001 61.7 kg (136 lb)   12/15/23 1344 63.1 kg (139 lb 1.8 oz)   12/13/23 0917 64.1 kg (141 lb 4.8 oz)   12/12/23 1518 61.7 kg (136 lb 0.4 oz)   12/08/23 1114 61.7 kg (136 lb)   12/05/23 1111 62 kg (136 lb 11 oz)   11/28/23 1124 62.9 kg (138 lb 10.7 oz)       Lab Results   Component Value Date    HGBA1C 5.2 03/15/2022     Lab Results   Component Value Date    CHOL 183 10/03/2022    HDL 76 (H) 10/03/2022    LDLCALC 81.6 10/03/2022    TRIG 127 10/03/2022    CHOLHDL 41.5 10/03/2022     Lab Results   Component Value Date     11/16/2023    K 4.2 11/16/2023     11/16/2023    CO2 25 11/16/2023    GLU 98 11/16/2023    BUN 14 11/16/2023    CREATININE 0.77 11/16/2023    CALCIUM 9.2 11/16/2023    PROT 6.2 11/16/2023    ALBUMIN 3.7 11/16/2023    BILITOT 0.5 11/16/2023    ALKPHOS 96 11/16/2023    AST 33 11/16/2023    ALT 30 11/16/2023    ANIONGAP 9 11/16/2023    ESTGFRAFRICA >60.0 06/20/2022    EGFRNONAA >60.0 06/20/2022    TSH 0.619 10/18/2022      No results found for: "MICALBCREAT"    Assessment/Plan:     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 See above regarding the plan for osteoporosis going forward.    Subclinical hyperthyroidism  Check TSH with upcoming lab work.    Thyroid nodule  Due for thyroid U/S. Will schedule in the upcoming weeks. She requested an appointment date to coincide with one of her other visits for transportation reasons.    Rheumatoid arthritis involving multiple sites  Increases risk for fracture  On chronic corticosteroids, which is her main risk factor for osteoporosis and fractures.  Due for visit with Dr. Moran.    Steroid-induced osteoporosis  Patient has glucocorticoid induced osteoporosis; Fracture risk remains very high. The recent issues with MRONJ further complicates her treatment since we will want to minimize any additional exposure to anti-resorptive agents.     She previously tolerated 2 full years of Forteo. Given our limited options and the presence of active ONJ, I believe resuming Forteo is the most reasonable option.  We discussed side effects including nausea and lightheadedness.  Discussed the increased incidence of bone tumors in rats studies, which fortunately has not been seen in humans.  My plan is to treat for at least the next two years.  We will then assess bone density and bone turnover markers regularly to determine if she will need additional treatment after that.    Continue Vit D + Calcium at current doses.  She inquired about vitamin K2.  It would be okay to start that although there is a paucity of data regarding factor risk prevention with this supplement.  She also inquired about BPC-157. I was not able to find any studies showing clinical benefit in humans.  Therefore I recommended avoiding that one until we have further information.    Check DXA in 12/2024 - need to check more frequently then every 2 years due to high fracture risk and to evaluate efficacy of therapy.    A total of 41 minutes  was spent on this visit, which includes: preparing to see the patient, obtaining history, performing a medically appropriate exam, counseling, ordering medications, tests, and/or procedures, and documenting the clinical information in the EHR.     Return to clinic in April.  Schedule thyroid ultrasound.  Added on labs to her upcoming visit in a few weeks.

## 2024-01-16 ENCOUNTER — OFFICE VISIT (OUTPATIENT)
Dept: PAIN MEDICINE | Facility: CLINIC | Age: 64
End: 2024-01-16
Attending: ANESTHESIOLOGY
Payer: MEDICARE

## 2024-01-16 ENCOUNTER — OFFICE VISIT (OUTPATIENT)
Dept: ORTHOPEDICS | Facility: CLINIC | Age: 64
End: 2024-01-16
Payer: MEDICARE

## 2024-01-16 DIAGNOSIS — M54.16 LUMBAR RADICULOPATHY: ICD-10-CM

## 2024-01-16 DIAGNOSIS — F11.90 CHRONIC, CONTINUOUS USE OF OPIOIDS: ICD-10-CM

## 2024-01-16 DIAGNOSIS — M96.1 POST LAMINECTOMY SYNDROME: ICD-10-CM

## 2024-01-16 DIAGNOSIS — M79.18 MYOFASCIAL PAIN: ICD-10-CM

## 2024-01-16 DIAGNOSIS — M16.12 PRIMARY OSTEOARTHRITIS OF LEFT HIP: ICD-10-CM

## 2024-01-16 DIAGNOSIS — Z98.1 S/P LUMBAR FUSION: Primary | ICD-10-CM

## 2024-01-16 DIAGNOSIS — M48.062 SPINAL STENOSIS OF LUMBAR REGION WITH NEUROGENIC CLAUDICATION: Primary | ICD-10-CM

## 2024-01-16 PROCEDURE — 1160F RVW MEDS BY RX/DR IN RCRD: CPT | Mod: CPTII,95,, | Performed by: ANESTHESIOLOGY

## 2024-01-16 PROCEDURE — 99443 PR PHYSICIAN TELEPHONE EVALUATION 21-30 MIN: CPT | Mod: 95,,, | Performed by: PHYSICIAN ASSISTANT

## 2024-01-16 PROCEDURE — 99214 OFFICE O/P EST MOD 30 MIN: CPT | Mod: 95,GC,, | Performed by: ANESTHESIOLOGY

## 2024-01-16 PROCEDURE — 1159F MED LIST DOCD IN RCRD: CPT | Mod: CPTII,95,, | Performed by: ANESTHESIOLOGY

## 2024-01-16 RX ORDER — METHOCARBAMOL 750 MG/1
750 TABLET, FILM COATED ORAL 4 TIMES DAILY
Qty: 120 TABLET | Refills: 0 | Status: SHIPPED | OUTPATIENT
Start: 2024-01-16 | End: 2024-02-19 | Stop reason: SDUPTHER

## 2024-01-16 NOTE — PROGRESS NOTES
Established Patient - Audio Only Telehealth Visit     The patient location is: [place]  The chief complaint leading to consultation is: mri results  Visit type: Virtual visit with audio only (telephone)  Total time spent with patient: 30 minutes       The reason for the audio only service rather than synchronous audio and video virtual visit was related to technical difficulties or patient preference/necessity.       Each patient to whom I provide medical services by telemedicine is:  (1) informed of the relationship between the physician and patient and the respective role of any other health care provider with respect to management of the patient; and (2) notified that they may decline to receive medical services by telemedicine and may withdraw from such care at any time. Patient verbally consented to receive this service via voice-only telephone call.       HPI: the patient presents for mri results.    MRI demonstrates a recurrent disc herniation at L4/5 resulting in severe stenosis a L4/5.     Assessment and plan:      Discussed with Dr. Mosqueda.  The patient is a candidate for L2-pelvis.  Discussed with the patient and her  at length.  They would like to think about this.                         This service was not originating from a related E/M service provided within the previous 7 days nor will  to an E/M service or procedure within the next 24 hours or my soonest available appointment.  Prevailing standard of care was able to be met in this audio-only visit.

## 2024-01-16 NOTE — PROGRESS NOTES
The patient location is: Home  The chief complaint leading to consultation is: Low Back Pain    Visit type: audiovisual    Face to Face time with patient: 20  30 minutes of total time spent on the encounter, which includes face to face time and non-face to face time preparing to see the patient (eg, review of tests), Obtaining and/or reviewing separately obtained history, Documenting clinical information in the electronic or other health record, Independently interpreting results (not separately reported) and communicating results to the patient/family/caregiver, or Care coordination (not separately reported).         Each patient to whom he or she provides medical services by telemedicine is:  (1) informed of the relationship between the physician and patient and the respective role of any other health care provider with respect to management of the patient; and (2) notified that he or she may decline to receive medical services by telemedicine and may withdraw from such care at any time.    Notes:         Subjective:      Patient ID: Joyce Peterson is a 63 y.o. female.    Chief Complaint: No chief complaint on file.    Referred by: No ref. provider found     HPI  Today 01/16/2024   She is here for follow-up for left hip pain.  She said the radiofrequency ablation she had in July of 2022 helped her tremendously.  Three months ago her pain started again on the lateral aspect of the left hip.  She is status post ORIF on the left hip.  She has heterotopic ossification.  She would like to repeat the procedure.  She said she will have to put off the spinal cord stimulator until an MRI is executed ordered by another provider.  No new symptomatology otherwise.    Interval history  She returns for virtual follow up of back and hip pain. At her last OV, Dr. Mayfield recommended [place] SCS trial. She had her first part of the psych consult and has the second part this afternoon. She has had limited benefit with multiple  interventions in the past including back surgery, procedures, PT and medications. She would like to move forward with SCS trial. Her pain today is 10/10.    Interval History 1/16/24:  Joyce Peterson returns for virtual follow up of low back and left hip pain. She is s/p L4/5 TLIF 6/22/23. She is planning for a left hip RFA and SCS trial with us. 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 She has since had an MRI of her L Spine in December that demonstrated a large amount of extruded disc material extending superiorly from L4-5 into the spinal canal with additional grade 2 anterolisthesis at this level contributing to severe spinal canal stenosis. She reports intermittent weakness in her left > right leg. Patient denies saddle anesthesia, bladder/bowel incontinence, ataxia, or increased falls. Shopping cart sign +. She is able to walk 1 block before needing to stop. Pain improves upon sitting. Pain currently ranges from 5-9/10, currently a 7/10. Pain radiates down her both her legs anterolaterally down to the ankle with additional shin pain. She has been on percocet 5-325 QID PRN and lyrica 150mg TID. She received opioids from Ortho 5/325 in October addition to our Rx. Ortho saw patient today and is recommending an L2-Pelvis        Past Medical History:   Diagnosis Date    Acid reflux     Allergy     Anemia     Asthma     Coronary artery disease     CS (cervical spondylosis) 03/08/2013    Degenerative disc disease     Degenerative joint disease of hindfoot, left 6/28/2022    Dry eyes     Dry mouth     Gastroparesis     History of methotrexate therapy 01/19/2022    Hyperlipidemia     Lateral meniscus derangement 04/06/2016    Lobular carcinoma in situ     Lumbar spondylosis 03/08/2013    Osteoarthritis     Osteoporosis     Pes planovalgus, acquired, left 6/28/2022    Rheumatoid arthritis(714.0)     Rupture of left triceps tendon 10/17/2018    Umbilical hernia 08/13/2015       Past  Surgical History:   Procedure Laterality Date    BREAST BIOPSY Left 01/29/2002    core bx    CARPAL TUNNEL RELEASE Right 05/2017    CHOLECYSTECTOMY  2004    COLONOSCOPY      10/11    COLONOSCOPY N/A 6/29/2017    Procedure: COLONOSCOPY;  Surgeon: López Moore MD;  [place] [facility] [place]  Service: Endoscopy;  Laterality: N/A;    EPIDURAL STEROID INJECTION N/A 11/18/2021    Procedure: INJECTION, STEROID, EPIDURAL, L5-S1 IL NEED CONSENT;  Surgeon: Tj Mayfield MD;  Location: [facility] PAIN MGT;  Service: Pain Management;  Laterality: N/A;    EPIDURAL STEROID INJECTION N/A 9/29/2022    Procedure: LUMBAR L3/L4 IL MADELINE CONTRAST;  Surgeon: Tj Mayfield MD;  Location: [facility] PAIN MGT;  Service: Pain Management;  Laterality: N/A;    EPIDURAL STEROID INJECTION N/A 12/12/2022    Procedure: INJECTION, STEROID, EPIDURAL L5/S1 IL CONTRAST;  Surgeon: Tj Mayfiled MD;  Location: [facility] PAIN MGT;  Service: Pain Management;  Laterality: N/A;    EPIDURAL STEROID INJECTION N/A 4/6/2023    Procedure: INJECTION, STEROID, EPIDURAL L5/S1;  Surgeon: Tj Mayfield MD;  Location: [facility] PAIN MGT;  Service: Pain Management;  Laterality: N/A;    FOOT ARTHRODESIS Left 1/11/2023    Procedure: FUSION, FOOT;  Surgeon: Ariella Finnegan MD;  Location: [facility] OR;  Service: Orthopedics;  Laterality: Left;  [facility]    FUSION, SPINE, LUMBAR, TLIF, POSTERIOR APPROACH, USING PEDICLE SCREW N/A 6/22/2023    Procedure: FUSION, SPINE, LUMBAR, TLIF, POSTERIOR APPROACH, USING PEDICLE SCREW L4/5 spinewave SNS:SSEP/EMG;  Surgeon: Jh Mosqueda MD;  [facility] [facility] [place] 2[facility];  Service: Neurosurgery;  Laterality: N/A;    HARVESTING OF BONE GRAFT Left 1/11/2023    Procedure: SURGICAL PROCUREMENT, BONE GRAFT;  Surgeon: Ariella Finnegan MD;  Location: [facility] OR;  Service: Orthopedics;  Laterality: Left;    INJECTION OF ANESTHETIC AGENT AROUND NERVE Bilateral 8/23/2021    Procedure: BLOCK, NERVE, MEDIAL BRANCH L3,L4,L5;  Surgeon: Tj Mayfield MD;  Location: [facility] PAIN MGT;   Service: Pain Management;  Laterality: Bilateral;  1 of 2    INJECTION OF ANESTHETIC AGENT AROUND NERVE Bilateral 8/26/2021    Procedure: BLOCK, NERVE, MEDIAL BRANCH L3,L4,L5;  Surgeon: Tj Mayfield MD;  Location: [facility] PAIN MGT;  Service: Pain Management;  Laterality: Bilateral;  2 of 2    INJECTION OF ANESTHETIC AGENT AROUND NERVE Left 7/7/2022    Procedure: BLOCK, NERVE, LEFT OBTURATOR AND FEMORAL;  Surgeon: Tj Mayfield MD;  Location: [facility] PAIN MGT;  Service: Pain Management;  Laterality: Left;    INJECTION OF FACET JOINT Bilateral 3/12/2020    Procedure: FACET JOINT INJECTION (LUMBAR BLOCK) BILATERAL L4-5 AND L5-S1 DIRECT REFERRAL;  Surgeon: Tj Mayfield MD;  Location: [facility] PAIN MGT;  Service: Pain Management;  Laterality: Bilateral;  NEEDS CONSENT    INJECTION OF FACET JOINT Bilateral 3/29/2021    Procedure: INJECTION, FACET JOINT L3/4, L4/5, L5/S1;  Surgeon: Tj Mayfield MD;  Location: [facility] PAIN MGT;  Service: Pain Management;  Laterality: Bilateral;    INJECTION OF JOINT Right 7/30/2020    Procedure: INJECTION, JOINT, RIGHT HIP Interarticular under flouro;  Surgeon: Tj Mayfield MD;  Location: [facility] PAIN MGT;  Service: Pain Management;  Laterality: Right;  INJECTION, JOINT, RIGHT HIP Interarticular under flouro    INJECTION OF JOINT Right 2/21/2022    Procedure: Injection, Joint RIGHT ISCHIAL BURSA;  Surgeon: Tj Mayfield MD;  Location: [facility] PAIN MGT;  Service: Pain Management;  Laterality: Right;    INTRAMEDULLARY RODDING OF FEMUR Left 5/21/2019    Procedure: INSERTION, INTRAMEDULLARY ARIEL, FEMUR;  Surgeon: López Duff MD;  Location: [address];  Service: Orthopedics;  Laterality: Left;    LENGTHENING OF TENDON OF LOWER EXTREMITY Left 1/11/2023    Procedure: LENGTHENING, TENDON, LOWER EXTREMITY;  Surgeon: Ariella Finnegan MD;  Location: [facility] OR;  Service: Orthopedics;  Laterality: Left;    MAGNETIC RESONANCE IMAGING N/A 5/29/2023    Procedure: MRI (Magnetic Resonance Imagine);  Surgeon: Sujey Robin;   Location: [facility];  Service: Anesthesiology;  Laterality: N/A;    MAGNETIC RESONANCE IMAGING N/A 1/10/2024    Procedure: MRI (Magnetic Resonance Imagine);  Surgeon: Sujey Robin;  Location: [facility];  Service: Anesthesiology;  Laterality: N/A;    RADIOFREQUENCY ABLATION Left 9/20/2021    Procedure: RADIOFREQUENCY ABLATION left L3,4,5 RFA  1 of 2  CONSENT NEEDED;  Surgeon: Tj Mayfield MD;  Location: [facility] PAIN [facility]GT;  Service: Pain Management;  Laterality: Left;    RADIOFREQUENCY ABLATION Right 10/4/2021    Procedure: RADIOFREQUENCY ABLATION  right L3,4,5 RFA   2 of 2  CONSENT NEEDED;  Surgeon: Tj Mayfield MD;  Location: [facility] PAIN [facility]GT;  Service: Pain Management;  Laterality: Right;    RADIOFREQUENCY ABLATION Left 4/21/2022    Procedure: Radiofrequency Ablation LEFT L3,L4,L5;  Surgeon: Tj Mayfield MD;  Location: [facility] PAIN [facility]GT;  Service: Pain Management;  Laterality: Left;    RADIOFREQUENCY ABLATION Right 5/12/2022    Procedure: Radiofrequency Ablation RIGHT L3,L4,L5;  Surgeon: Tj Mayfield MD;  Location: [facility]IN [facility]GT;  Service: Pain Management;  Laterality: Right;    RADIOFREQUENCY ABLATION Left 7/25/2022    Procedure: Radiofrequency Ablation Left Femoral & Obturator;  Surgeon: Tj Mayfield MD;  Location: [facility] PAIN [facility]GT;  Service: Pain Management;  Laterality: Left;    REPAIR OF TRICEPS TENDON Left 10/17/2018    Procedure: REPAIR, TENDON, TRICEPS left elbow;  Surgeon: Staci Yarbrough MD;  Location: [facility] OR [address] [place] [address];  Service: Orthopedics;  Laterality: Left;  Anesthesia: General and regional. PRONE, k-wire , hand pan 1 and pan 2, CALL ARTHLoot!/Tamera notified 10-12 LO    TONSILLECTOMY      TRANSFORAMINAL EPIDURAL INJECTION OF STEROID Right 8/31/2020    Procedure: INJECTION, STEROID, EPIDURAL, TRANSFORAMINAL,  APPROACH, L3-L4 and L4-L5 need consent;  Surgeon: Tj Mayfield MD;  Location: [facility] PAIN [facility]GT;  Service: Pain Management;  Laterality: Right;    TRANSFORAMINAL EPIDURAL INJECTION OF STEROID Bilateral  "7/23/2021    Procedure: INJECTION, STEROID, EPIDURAL, TRANSFORAMINAL APPROACH L4/5;  Surgeon: Tj Mayfield MD;  Location: [facility] PAIN [facility]GT;  Service: Pain Management;  Laterality: Bilateral;    TRANSFORAMINAL EPIDURAL INJECTION OF STEROID Bilateral 9/25/2023    Procedure: LUMBAR TRANSFORAMINAL BILATERAL  L2/3 DIRECT REFERRAL;  Surgeon: Tj Mayfield MD;  Location: [facility] PAIN [facility]GT;  Service: Pain Management;  Laterality: Bilateral;    TRANSFORAMINAL EPIDURAL INJECTION OF STEROID Left 10/18/2023    Procedure: LUMBAR TRANSFORAMINAL LEFT L3/4 AND L4/5 * CLEARANCE IN CHART*;  Surgeon: Tj Mayfield MD;  Location: [facility] PAIN [facility]GT;  Service: Pain Management;  Laterality: Left;    TRIGGER POINT INJECTION N/A 7/23/2021    Procedure: INJECTION, TRIGGER POINT SCAPULAR;  Surgeon: Tj Mayfield MD;  Location: [facility] PAIN [facility]GT;  Service: Pain Management;  Laterality: N/A;    TUBAL LIGATION  2003    UPPER GASTROINTESTINAL ENDOSCOPY      10/11    uterine ablation  2003       Review of patient's allergies indicates:   Allergen Reactions    Actemra [tocilizumab] Other (See Comments)     Severe dizziness    Codeine Nausea And Vomiting and Hives    Gold au 198 Hives and Rash    Hydroxychloroquine Other (See Comments)     Can't remember the reaction      Iodinated contrast media Other (See Comments)     Other reaction(s): BURNING ALL OVER    Iodine Other (See Comments) and Hives     Other reaction(s): BURNING ALL OVER - Iodine dye - Not topical    Ondansetron Nausea And Vomiting    Sulfa (sulfonamide antibiotics) Other (See Comments)     Can't remember the reaction    Zofran [ondansetron hcl (pf)] Nausea And Vomiting     Pt reports that last time she received zofran she started vomiting again    Methotrexate analogues Nausea Only    Pneumococcal vaccine Other (See Comments)    Pneumovax 23 [pneumococcal 23-argenis ps vaccine] Other (See Comments)     "sick"    Methotrexate Nausea Only       Current Outpatient Medications   Medication Sig " "Dispense Refill    albuterol (PROVENTIL) 2.5 mg /3 mL (0.083 %) nebulizer solution Take 3 mLs (2.5 mg total) by nebulization every 6 (six) hours as needed for Wheezing. Rescue 75 mL 0    albuterol (PROVENTIL/VENTOLIN HFA) 90 mcg/actuation inhaler Inhale 2 puffs into the lungs every 6 (six) hours as needed. Rescue 20.1 g 11    albuterol-ipratropium (DUO-NEB) 2.5 mg-0.5 mg/3 mL nebulizer solution Take 3 mLs by nebulization every 6 (six) hours as needed for Wheezing. Rescue 90 mL 3    amoxicillin-clavulanate 875-125mg (AUGMENTIN) 875-125 mg per tablet Take 1 tablet by mouth every 12 hours 20 tablet 0    azelastine (ASTELIN) 137 mcg (0.1 %) nasal spray Use 1 spray (137 mcg total) in each nostril 2 (two) times daily. (Patient taking differently: 1 spray by Nasal route 2 (two) times daily as needed.) 30 mL 1    azithromycin (Z-DEVON) 250 MG tablet Take 2 tablets by mouth on day 1, then take 1 tablet daily until all taken (Patient not taking: Reported on 1/9/2024) 6 tablet 0    BD ULTRA-FINE JAIME PEN NEEDLE 32 gauge x 5/32" Ndle For Forteo - inject daily 100 each 3    budesonide-formoterol 160-4.5 mcg (SYMBICORT) 160-4.5 mcg/actuation HFAA Inhale 2 puffs into the lungs every 12 (twelve) hours. 30.6 g 3    calcium citrate-vitamin D3 315-200 mg (CITRACAL+D) 315 mg-5 mcg (200 unit) per tablet Take 1 tablet by mouth 2 (two) times daily.       chlorhexidine (PERIDEX) 0.12 % solution Rinse with 2 teaspoons (10 ml's) by mouth three times daily as directed 473 mL 0    cycloSPORINE (RESTASIS) 0.05 % ophthalmic emulsion PLACE 1 DROP INTO BOTH EYES TWICE A  each 3    diclofenac sodium (VOLTAREN) 1 % Gel APPLY 2 GRAMS TOPICALLY 4 (FOUR) TIMES DAILY AS NEEDED. 300 g 2    fluconazole (DIFLUCAN) 100 MG tablet Take 1 tablet by mouth once for 1 dose 5 tablet 0    fluconazole (DIFLUCAN) 150 MG Tab Take 150 mg by mouth as needed.      fluconazole (DIFLUCAN) 150 MG Tab Take  1 tablet by mouth now 1 tablet 1    fluticasone propionate " (FLONASE) 50 mcg/actuation nasal spray Use 1 spray (50 mcg total) in each nostril once daily. 16 g 1    ibuprofen (ADVIL,MOTRIN) 800 MG tablet Take 1 tablet by mouth every 6 to 8 hours as needed for pain 30 tablet 0    INV PROPULSID 10 MG Take 2 tablets (20 mg) by mouth 4 (four) times daily. FOR INVESTIGATIONAL USE ONLY. Protocol [id number] Subject ID: [id number]. 1440 each 0    leflunomide (ARAVA) 20 MG Tab Take 1 tablet (20 mg total) by mouth once daily. 90 tablet 0    lifitegrast (XIIDRA) 5 % Dpet INSTILL ONE DROP INTO BOTH EYES TWICE A DAY 60 mL 10    magic mouthwash diphen/antac/lidoc rinse mouth with 5 ml for 30 seconds and spit out, twice daily 150 mL 2    methenamine (HIPREX) 1 gram Tab Take 1 tablet (1 g total) by mouth 2 (two) times daily. 180 tablet 3    methocarbamoL (ROBAXIN) 750 MG Tab Take 1 tablet (750 mg total) by mouth 4 (four) times daily. 120 tablet 0    methocarbamoL (ROBAXIN) 750 MG Tab Take 1 tablet (750 mg total) by mouth 3 (three) times daily as needed. 90 tablet 1    mirabegron (MYRBETRIQ) 25 mg Tb24 ER tablet Take 1 tablet (25 mg total) by mouth once daily. 90 tablet 3    montelukast (SINGULAIR) 10 mg tablet Take 1 tablet (10 mg total) by mouth every evening. 90 tablet 3    naproxen (NAPROSYN) 500 MG tablet TAKE 1 TABLET (500 MG TOTAL) BY MOUTH 2 (TWO) TIMES DAILY AS NEEDED 180 tablet 1    omeprazole (PRILOSEC) 40 MG capsule Take 1 capsule (40 mg total) by mouth every morning. 30 capsule 6    omeprazole-sodium bicarbonate (ZEGERID) 40-1.1 mg-gram per capsule TAKE 1 CAPSULE BY MOUTH EVERY DAY IN THE MORNING 90 capsule 3    oxyCODONE-acetaminophen (PERCOCET) 5-325 mg per tablet Take 1 tablet by mouth every 6 (six) hours as needed for Pain. 120 tablet 0    POTASSIUM ORAL Take by mouth once daily.      predniSONE (DELTASONE) 5 MG tablet TAKE 1 TABLET BY MOUTH EVERY DAY (Patient taking differently: Take 6 mg by mouth once daily.) 90 tablet 1    pregabalin (LYRICA) 150 MG capsule Take 1 capsule  (150 mg total) by mouth 3 (three) times daily. 90 capsule 2    promethazine (PHENERGAN) 25 MG tablet Take 1 tablet (25 mg total) by mouth every 6 (six) hours as needed for Nausea. (Patient not taking: Reported on 1/9/2024) 15 tablet 0    rosuvastatin (CRESTOR) 20 MG tablet Take 1 tablet by mouth every day in the evening 90 tablet 3    sarilumab (KEVZARA) 200 mg/1.14 mL Syrg Inject 1.14 mL (200 mg total) into the skin every 14 (fourteen) days. (Patient not taking: Reported on 1/9/2024) 2.28 mL 1    sucralfate (CARAFATE) 1 gram tablet TAKE 1 TABLET (1 G TOTAL) BY MOUTH 4 (FOUR) TIMES DAILY. 360 tablet 2    teriparatide (FORTEO) 20 mcg/dose (600mcg/2.4mL) PnIj Inject 0.08 mLs (20 mcg total) into the skin once daily. 2.4 mL 11    triamcinolone acetonide 0.1% (KENALOG) 0.1 % paste Place onto teeth 2 (two) times daily. Apply to oral ulcers twice daily until resolved 5 g 1     No current facility-administered medications for this visit.       Family History   Problem Relation Age of Onset    Cancer Mother         Lung Cancer    Emphysema Mother     Heart attack Mother     Alcohol abuse Mother     Cancer Father         Lung Cancer    Osteoarthritis Father     Lung cancer Father     Skin cancer Father     Alcohol abuse Father     Heart disease Brother     Heart attack Brother     Alcohol abuse Brother     Cirrhosis Brother     Osteoarthritis Paternal Aunt     Retinal detachment Maternal Aunt     No Known Problems Sister     No Known Problems Maternal Uncle     No Known Problems Paternal Uncle     No Known Problems Maternal Grandmother     No Known Problems Maternal Grandfather     No Known Problems Paternal Grandmother     No Known Problems Paternal Grandfather     Colon cancer Neg Hx     Esophageal cancer Neg Hx     Stomach cancer Neg Hx     Celiac disease Neg Hx     Diabetes Neg Hx     Thyroid disease Neg Hx     Amblyopia Neg Hx     Blindness Neg Hx     Cataracts Neg Hx     Glaucoma Neg Hx     Hypertension Neg Hx      Macular degeneration Neg Hx     Strabismus Neg Hx     Stroke Neg Hx        Social History     Socioeconomic History    Marital status:    Tobacco Use    Smoking status: Never    Smokeless tobacco: Never   Substance and Sexual Activity    Alcohol use: Yes     Comment: 2-3 times per year.    Drug use: No    Sexual activity: Yes     Partners: Male     Birth control/protection: Surgical     Social Determinants of Health     Financial Resource Strain: Patient Declined (12/5/2023)    Overall Financial Resource Strain (CARDIA)     Difficulty of Paying Living Expenses: Patient declined   Food Insecurity: Patient Declined (12/5/2023)    Hunger Vital Sign     Worried About Running Out of Food in the Last Year: Patient declined     Ran Out of Food in the Last Year: Patient declined   Transportation Needs: Patient Declined (12/5/2023)    PRAPARE - Transportation     Lack of Transportation (Medical): Patient declined     Lack of Transportation (Non-Medical): Patient declined   Physical Activity: Unknown (12/5/2023)    Exercise Vital Sign     Days of Exercise per Week: Patient declined     Minutes of Exercise per Session: 0 min   Recent Concern: Physical Activity - Inactive (9/6/2023)    Exercise Vital Sign     Days of Exercise per Week: 0 days     Minutes of Exercise per Session: 0 min   Stress: Patient Declined (12/5/2023)    [employer] [place] [employer] - Occupational Stress Questionnaire     Feeling of Stress : Patient declined   Social Connections: Unknown (12/5/2023)    Social Connection and Isolation Panel [NHANES]     Frequency of Communication with Friends and Family: Patient declined     Frequency of Social Gatherings with Friends and Family: Patient declined     Attends Temple Services: More than 4 times per year     Active Member of Clubs or Organizations: Patient declined     Attends Club or Organization Meetings: Patient declined     Marital Status: Patient declined   Housing Stability: Unknown  (12/5/2023)    Housing Stability Vital Sign     Unable to Pay for Housing in the Last Year: Patient refused     Number of Places Lived in the Last Year: 1     Unstable Housing in the Last Year: No           ROS        Objective:   LMP  (LMP Unknown)   Pain Disability Index Review:      12/26/2023    10:40 AM 11/28/2023    11:25 AM 5/15/2023     9:40 AM   Last 3 PDI Scores   Pain Disability Index (PDI) 56 51 41         PHYSICAL EXAMINATION: Limited due to nature of virtual examw    General appearance: Well appearing, in no acute distress, alert and oriented x3.  Psych:  Mood and affect appropriate.  Skin: Skin color normal, no rashes or lesions, in both upper and lower body.  Extremities: Moves all visualized extremities freely.      PREVIOUS PHYSICAL EXAM: Limited due to nature of virtual exam  Normocephalic.  Atraumatic.  Affect appropriate.  Breathing unlabored.  Extra ocular muscles intact.           General    Vitals reviewed.    General Musculoskeletal Exam   Gait: antalgic     Right Ankle/Foot Exam     Tests   Heel Walk: able to perform  Tiptoe Walk: able to perform    Left Ankle/Foot Exam     Tests   Heel Walk: able to perform  Tiptoe Walk: able to perform      Right Hip Exam     Range of Motion   External rotation:  normal   Internal rotation:  normal     Tests   Pain w/ forced internal rotation (SANDRA): absent  Elias: negative    Other   Sensation: normal  Left Hip Exam     Range of Motion   External rotation:  normal   Internal rotation: normal     Tests   Pain w/ forced internal rotation (SANDRA): absent  Elias: negative    Other   Sensation: normal      Back (L-Spine & T-Spine) / Neck (C-Spine) Exam     Back (L-Spine & T-Spine) Range of Motion   Extension:  abnormal Back extension: Limited due to pain.  Flexion:  abnormal     Back (L-Spine & T-Spine) Tests   Right Side Tests  Femoral Stretch: negative  Left Side Tests  Femoral Stretch: negative    Comments:   positive sacroiliac joint pain with finger  [place] test, thigh thrust test and compression test.  Negative SANDRA's      Muscle Strength   Right Lower Extremity   Hip Flexion: 5/5   Quadriceps:  5/5   Hamstrin/5   Gastrocsoleus:  5/5   EHL:  5/5  Left Lower Extremity   Hip Flexion: 5/5   Quadriceps:  5/5   Hamstrin/5   Gastrocsoleus:  5/5   EHL:  5/5    Reflexes     Left Side  Babinski Sign:  absent  Ankle Clonus:  absent    Right Side   Babinski Sign:  absent  Ankle Clonus:  absent    Vascular Exam       Capillary Refill  Right Hand: normal capillary refill  Left Hand: normal capillary refill        Edema  Right Upper Leg: absent  Left Upper Leg: absent  Positive left hip pain with internal external rotation specially on the lateral aspect.  No groin pain.  Palpation of the joint line produces pain.      Assessment:       Encounter Diagnoses   Name Primary?    Spinal stenosis of lumbar region with neurogenic claudication Yes    Post laminectomy syndrome     Primary osteoarthritis of left hip     Chronic, continuous use of opioids          Plan:   We discussed with the patient the assessment and recommendations. The following is the plan we agreed on:  Personally reviewed patient's MRI of L spine.   SCS and L hip RFA previously postponed due to prolio induced osteonecrosis of the jaw. Last dose prolia administered in December.  Will keep scheduled SCS (2/15/24) and left hip RFA (3/11/24) for now.   Will discuss [place] SCS trial with Dr. Mosqueda. Ortho recommending L2-Pelvis fusion. There is a significant L4/5 disc herniation with grade 2 anterolisthesis at that level on MRI that is concordant with patient's L5 radicular sx, spinal stenosis with neurogenic claudication sx, and weakness. We agree with ortho's surgical recommendation. SCS unlikely to improve neurogenic claudication sx and reported weakness while surgery is most likely to improve these sx. SCS could help with pain while awaiting surgery and could be indicated if pain persists after  surgery. Ortho fusion surgery delay due to osteonecrosis of the jaw will likely be longer than SCS delay. Both procedures will require clearance from dentist given osteonecrosis of the jaw.  Continue current medications.  Refill robaxin 750mg QID. Refill the patient's oxycodone 5 q.i.d. p.r.n.  The patient will continue a home exercise routine to help with pain and strengthening.    Continue with planned left hip obturator and femoral cooled radiofrequency ablation.    Return to clinic as needed otherwise follow-up 6 weeks after the procedure.        The above plan and management options were discussed at length with patient. Patient is in agreement with the above and verbalized understanding.     Eliseo Gutierrez MD  01/16/2024     I have personally taken the history and examined this patient and agree with the resident's note as stated above.

## 2024-01-17 ENCOUNTER — TELEPHONE (OUTPATIENT)
Dept: NEUROSURGERY | Facility: CLINIC | Age: 64
End: 2024-01-17
Payer: MEDICARE

## 2024-01-17 DIAGNOSIS — M54.50 DORSALGIA OF LUMBAR REGION: Primary | ICD-10-CM

## 2024-01-22 ENCOUNTER — TELEPHONE (OUTPATIENT)
Dept: RHEUMATOLOGY | Facility: CLINIC | Age: 64
End: 2024-01-22
Payer: MEDICARE

## 2024-01-22 ENCOUNTER — OFFICE VISIT (OUTPATIENT)
Dept: FAMILY MEDICINE | Facility: CLINIC | Age: 64
End: 2024-01-22
Payer: MEDICARE

## 2024-01-22 VITALS
BODY MASS INDEX: 26.39 KG/M2 | TEMPERATURE: 98 F | WEIGHT: 139.75 LBS | HEART RATE: 98 BPM | DIASTOLIC BLOOD PRESSURE: 74 MMHG | HEIGHT: 61 IN | SYSTOLIC BLOOD PRESSURE: 122 MMHG | OXYGEN SATURATION: 97 %

## 2024-01-22 DIAGNOSIS — E05.90 SUBCLINICAL HYPERTHYROIDISM: ICD-10-CM

## 2024-01-22 DIAGNOSIS — N28.89 URETEROCELE: ICD-10-CM

## 2024-01-22 DIAGNOSIS — M06.9 RHEUMATOID ARTHRITIS INVOLVING MULTIPLE SITES, UNSPECIFIED WHETHER RHEUMATOID FACTOR PRESENT: Chronic | ICD-10-CM

## 2024-01-22 DIAGNOSIS — G89.29 OTHER CHRONIC PAIN: ICD-10-CM

## 2024-01-22 DIAGNOSIS — M35.00 SJOGREN'S SYNDROME, WITH UNSPECIFIED ORGAN INVOLVEMENT: ICD-10-CM

## 2024-01-22 DIAGNOSIS — J45.20 MILD INTERMITTENT ASTHMA WITHOUT COMPLICATION: ICD-10-CM

## 2024-01-22 DIAGNOSIS — M48.061 SPINAL STENOSIS, LUMBAR REGION WITHOUT NEUROGENIC CLAUDICATION: Primary | ICD-10-CM

## 2024-01-22 DIAGNOSIS — D84.9 IMMUNOSUPPRESSED STATUS: ICD-10-CM

## 2024-01-22 DIAGNOSIS — K31.84 GASTROPARESIS: ICD-10-CM

## 2024-01-22 DIAGNOSIS — K21.9 GASTROESOPHAGEAL REFLUX DISEASE WITHOUT ESOPHAGITIS: ICD-10-CM

## 2024-01-22 DIAGNOSIS — I25.10 CORONARY ARTERY DISEASE INVOLVING NATIVE CORONARY ARTERY OF NATIVE HEART WITHOUT ANGINA PECTORIS: ICD-10-CM

## 2024-01-22 DIAGNOSIS — E04.1 THYROID NODULE: Chronic | ICD-10-CM

## 2024-01-22 DIAGNOSIS — T38.0X5A STEROID-INDUCED OSTEOPOROSIS: Chronic | ICD-10-CM

## 2024-01-22 DIAGNOSIS — D50.0 IRON DEFICIENCY ANEMIA DUE TO CHRONIC BLOOD LOSS: ICD-10-CM

## 2024-01-22 DIAGNOSIS — K76.0 NAFLD (NONALCOHOLIC FATTY LIVER DISEASE): ICD-10-CM

## 2024-01-22 DIAGNOSIS — E78.00 PURE HYPERCHOLESTEROLEMIA: ICD-10-CM

## 2024-01-22 DIAGNOSIS — M06.9 RHEUMATOID ARTHRITIS: ICD-10-CM

## 2024-01-22 DIAGNOSIS — M87.180 OSTEONECROSIS OF JAW DUE TO DRUG: ICD-10-CM

## 2024-01-22 DIAGNOSIS — M81.8 STEROID-INDUCED OSTEOPOROSIS: Chronic | ICD-10-CM

## 2024-01-22 DIAGNOSIS — B37.81 CANDIDAL ESOPHAGITIS: ICD-10-CM

## 2024-01-22 PROCEDURE — 1159F MED LIST DOCD IN RCRD: CPT | Mod: CPTII,S$GLB,, | Performed by: STUDENT IN AN ORGANIZED HEALTH CARE EDUCATION/TRAINING PROGRAM

## 2024-01-22 PROCEDURE — 1160F RVW MEDS BY RX/DR IN RCRD: CPT | Mod: CPTII,S$GLB,, | Performed by: STUDENT IN AN ORGANIZED HEALTH CARE EDUCATION/TRAINING PROGRAM

## 2024-01-22 PROCEDURE — 3008F BODY MASS INDEX DOCD: CPT | Mod: CPTII,S$GLB,, | Performed by: STUDENT IN AN ORGANIZED HEALTH CARE EDUCATION/TRAINING PROGRAM

## 2024-01-22 PROCEDURE — 3074F SYST BP LT 130 MM HG: CPT | Mod: CPTII,S$GLB,, | Performed by: STUDENT IN AN ORGANIZED HEALTH CARE EDUCATION/TRAINING PROGRAM

## 2024-01-22 PROCEDURE — 99215 OFFICE O/P EST HI 40 MIN: CPT | Mod: S$GLB,,, | Performed by: STUDENT IN AN ORGANIZED HEALTH CARE EDUCATION/TRAINING PROGRAM

## 2024-01-22 PROCEDURE — 3078F DIAST BP <80 MM HG: CPT | Mod: CPTII,S$GLB,, | Performed by: STUDENT IN AN ORGANIZED HEALTH CARE EDUCATION/TRAINING PROGRAM

## 2024-01-22 PROCEDURE — 99999 PR PBB SHADOW E&M-EST. PATIENT-LVL V: CPT | Mod: PBBFAC,,, | Performed by: STUDENT IN AN ORGANIZED HEALTH CARE EDUCATION/TRAINING PROGRAM

## 2024-01-22 RX ORDER — LEFLUNOMIDE 20 MG/1
20 TABLET ORAL DAILY
Qty: 90 TABLET | Refills: 0 | Status: SHIPPED | OUTPATIENT
Start: 2024-01-22 | End: 2024-03-27 | Stop reason: SDUPTHER

## 2024-01-23 ENCOUNTER — PATIENT MESSAGE (OUTPATIENT)
Dept: ENDOCRINOLOGY | Facility: CLINIC | Age: 64
End: 2024-01-23
Payer: MEDICARE

## 2024-01-23 ENCOUNTER — PATIENT MESSAGE (OUTPATIENT)
Dept: FAMILY MEDICINE | Facility: CLINIC | Age: 64
End: 2024-01-23
Payer: MEDICARE

## 2024-01-23 DIAGNOSIS — M48.061 SPINAL STENOSIS, LUMBAR REGION WITHOUT NEUROGENIC CLAUDICATION: Primary | ICD-10-CM

## 2024-01-23 NOTE — TELEPHONE ENCOUNTER
Ref sent  Pt also needs the following to be faxed with referral:  Dr. Cristiano Gonzales the notes from my surgery and all the 2 MRI images and all the x-rays of my back before and after surgery

## 2024-01-24 ENCOUNTER — TELEPHONE (OUTPATIENT)
Dept: FAMILY MEDICINE | Facility: CLINIC | Age: 64
End: 2024-01-24
Payer: MEDICARE

## 2024-01-24 ENCOUNTER — TELEPHONE (OUTPATIENT)
Dept: PAIN MEDICINE | Facility: CLINIC | Age: 64
End: 2024-01-24
Payer: MEDICARE

## 2024-01-24 ENCOUNTER — PATIENT MESSAGE (OUTPATIENT)
Dept: PAIN MEDICINE | Facility: OTHER | Age: 64
End: 2024-01-24
Payer: MEDICARE

## 2024-01-24 NOTE — TELEPHONE ENCOUNTER
----- Message from Darya Feliciano sent at 1/23/2024  2:03 PM CST -----   Name of Who is Calling:     What is the request in detail:  patient request call back in reference to reschedule procedure Please contact to further discuss and advise      Can the clinic reply by [facility]:     [facility]   [phone]

## 2024-01-24 NOTE — TELEPHONE ENCOUNTER
Staff spoke with the pt in regards to her message about rescheduling both of her upcoming procedures pt is requesting her RFA be sooner than March and her Spinal Cord Stimulator be rescheduled due to her having an appt with a neurosurgeon that same day 02/15/24    ----- Message from Darya Feliciano sent at 1/23/2024  2:03 PM CST -----   Name of Who is Calling:     What is the request in detail:  patient request call back in reference to reschedule procedure Please contact to further discuss and advise      Can the clinic reply by [facility]:     What Number to Call Back if not in [facility]:   [phone]

## 2024-01-24 NOTE — TELEPHONE ENCOUNTER
Called and spoke with pt in regards of her needing to reach out to medical records to get her recent MRI and x-ray results downloaded on disc for her upcoming apt she has with Dr. Cristiano Gonzales coming up. Pt verbalized understanding of message and will work on that. Informed pt that I am going to fax over referral and notes as she requested of us as well.

## 2024-01-28 PROBLEM — R26.89 IMPAIRED GAIT AND MOBILITY: Status: RESOLVED | Noted: 2023-03-01 | Resolved: 2024-01-28

## 2024-01-28 PROBLEM — M48.061 LUMBAR STENOSIS: Status: RESOLVED | Noted: 2023-06-23 | Resolved: 2024-01-28

## 2024-01-28 PROBLEM — M19.072 PRIMARY OSTEOARTHRITIS, LEFT ANKLE AND FOOT: Status: RESOLVED | Noted: 2022-06-28 | Resolved: 2024-01-28

## 2024-01-28 PROBLEM — Z79.51 LONG TERM (CURRENT) USE OF INHALED STEROIDS: Status: RESOLVED | Noted: 2023-06-29 | Resolved: 2024-01-28

## 2024-01-28 PROBLEM — M79.7 FIBROMYALGIA: Status: RESOLVED | Noted: 2020-03-12 | Resolved: 2024-01-28

## 2024-01-28 PROBLEM — E87.6 HYPOKALEMIA: Status: RESOLVED | Noted: 2023-06-24 | Resolved: 2024-01-28

## 2024-01-28 PROBLEM — M81.0 AGE-RELATED OSTEOPOROSIS WITHOUT CURRENT PATHOLOGICAL FRACTURE: Status: RESOLVED | Noted: 2023-06-29 | Resolved: 2024-01-28

## 2024-01-28 PROBLEM — R60.0 LOWER EXTREMITY EDEMA: Status: RESOLVED | Noted: 2023-06-23 | Resolved: 2024-01-28

## 2024-01-28 PROBLEM — L02.415: Status: RESOLVED | Noted: 2023-06-27 | Resolved: 2024-01-28

## 2024-01-28 PROBLEM — Z79.890 HORMONE REPLACEMENT THERAPY (HRT): Status: RESOLVED | Noted: 2022-12-29 | Resolved: 2024-01-28

## 2024-01-28 PROBLEM — Z47.89 ENCOUNTER FOR OTHER ORTHOPEDIC AFTERCARE: Status: RESOLVED | Noted: 2023-06-29 | Resolved: 2024-01-28

## 2024-01-28 PROBLEM — Z79.899 LONG-TERM USE OF HIGH-RISK MEDICATION: Status: RESOLVED | Noted: 2023-11-07 | Resolved: 2024-01-28

## 2024-01-28 PROBLEM — D64.9 ANEMIA, UNSPECIFIED: Status: RESOLVED | Noted: 2023-06-29 | Resolved: 2024-01-28

## 2024-01-28 PROBLEM — R74.8 ELEVATED SERUM GGT LEVEL: Status: RESOLVED | Noted: 2021-03-17 | Resolved: 2024-01-28

## 2024-01-28 NOTE — PROGRESS NOTES
Subjective:       Patient ID: Joyce Peterson is a 63 y.o. female.    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 01/11/2024     OFF Prolia  Now on Forteo         History of falling 06/29/2023    Arthrodesis status 06/29/2023    Neuropathic pain of left foot 02/03/2023    NAFLD (nonalcoholic fatty liver disease) 12/29/2022     Weight loss advised  Difficult due to inability to exercise 2/2 chronic pain       Chronic pain 12/29/2022     Continues with pain   Now on good side, sciatic pains, getting worse       History of methotrexate therapy 01/19/2022    Spinal stenosis, lumbar region without neurogenic claudication 07/23/2021     S/p fusion  Now with another disc issue causing more pain on opposite side   Still not fully recovered from surgery and being told will need another, she is not happy with this and wants to make sure there is not something missed and will look into getting a second opinion while waits for neurosurg appt       Dry eyes     Ureterocele      Follows with urogyn  Methenamine   mirabegron      Subclinical hyperthyroidism 02/21/2020     Follows with endo  Asymptomatic       Coronary artery disease involving native coronary artery of native heart without angina pectoris 07/16/2019     Managed per cards   Statin   Asymptomatic       Candidal esophagitis 10/15/2018     Sees GI routinely  likely 2/2 chronic steroid use  On PPI   Diflucan as needed       Sjogren's syndrome 05/21/2018     Follows with rheum   On xiidra and restasis for eyes  Leflunomide daily       Iron deficiency anemia due to chronic blood loss 11/14/2017     PRN iron infusion per heme       Rheumatoid arthritis involving multiple sites 11/16/2016     Managed per rheum   erosive RA discontinued long term methotrexate b/o nausea with po and sc and reduced dose, allergic to sulfa, and had reaction to  hydroxychloroquine;  Has failed 3 TNFi(infliximab, etanercept, adalimumab), abatacept, hypogamma with rituximab and gets frequent infections in any event, acute vertigo/dizziness with tocilizumab x2  Failed tofacitinib  Currently on Sarilumab    Future option would be upadacitinib      Mild intermittent asthma without complication 11/04/2015     Follows with pulm  Albuterol PRN  Symbicort BID  PPI daily   Patient with difficult to control triggers which include reflux with esophogeal dysmotility and allergic rhinitis.  Unfortunately, these triggers are difficult to control and are likely etiology of chronic cough with only mild intermittent asthma.          Thyroid nodule 08/22/2013     Follows with endo       Hyperlipidemia 08/22/2013     Well controlled  Crestor 20   Asymptomatic       Steroid-induced osteoporosis 11/29/2012     Manage dper endo and rheum   + jaw 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 bisphosphonate use (prolia) per endodontist, just completed abx (long course) and pain is much better so will monitor for now   On Forteo now       Gastroparesis 08/07/2012     Follows with GI   Stable  On carafate, PPI x 2      GERD (gastroesophageal reflux disease) 08/01/2012     PPI daily   stable          Review of Systems   All other systems reviewed and are negative.       A1C:  Recent Labs   Lab 03/15/22  1507   Hemoglobin A1C 5.2     CBC:  Recent Labs   Lab 08/22/23  1351 11/16/23  1047 11/28/23  1635   WBC 5.38 5.05 5.28   RBC 4.27 4.15 3.97 L   Hemoglobin 11.0 L 11.8 L 11.5 L   Hematocrit 35.1 L 37.4 35.2 L   Platelets 272 261 244   MCV 82 90 89   MCH 25.8 L 28.4 29.0   MCHC 31.3 L 31.6 L 32.7     CMP:  Recent Labs   Lab 08/22/23  1351 09/07/23  1130 11/16/23  1047   Glucose 111 H  --  98   Calcium 9.3  --  9.2   Albumin 3.8 4.0 3.7   Total Protein 6.1 6.3 6.2   Sodium 139  --  140   Potassium 3.7  --  4.2   CO2 24  --  25   Chloride 106  --  106   BUN 12  --  14   Creatinine 0.8  --  0.77   Alkaline Phosphatase 132 136 H 96   ALT  36 37 30   AST 44 H 42 H 33   Total Bilirubin 0.3 0.3 0.5     LIPIDS:  Recent Labs   Lab 07/26/21  0801 11/17/21  0948 10/03/22  0849 10/18/22  1133   TSH 2.330   < >  --  0.619   HDL 83 H  --  76 H  --    Cholesterol 197  --  183  --    Triglycerides 156 H  --  127  --    LDL Cholesterol 82.8  --  81.6  --    HDL/Cholesterol Ratio 42.1  --  41.5  --    Non-HDL Cholesterol 114  --  107  --    Total Cholesterol/HDL Ratio 2.4  --  2.4  --     < > = values in this interval not displayed.     TSH:  Recent Labs   Lab 07/26/21  0801 11/17/21  0948 10/18/22  1133   TSH 2.330 1.200 0.619        Objective:      Vitals:    01/22/24 1345   BP: 122/74   Pulse: 98   Temp: 97.9 °F (36.6 °C)      Physical Exam  Vitals reviewed.   Constitutional:       Appearance: Normal appearance. She is normal weight.   HENT:      Head: Normocephalic and atraumatic.   Eyes:      Conjunctiva/sclera: Conjunctivae normal.   Cardiovascular:      Rate and Rhythm: Normal rate and regular rhythm.      Heart sounds: Normal heart sounds.   Pulmonary:      Effort: Pulmonary effort is normal.      Breath sounds: Normal breath sounds.   Abdominal:      Palpations: Abdomen is soft.      Tenderness: There is no abdominal tenderness.   Musculoskeletal:         General: Normal range of motion.      Cervical back: Normal range of motion.      Right lower leg: No edema.      Left lower leg: No edema.   Neurological:      Mental Status: She is alert. Mental status is at baseline.   Psychiatric:         Mood and Affect: Mood normal.         Behavior: Behavior normal.          Assessment:       1. Spinal stenosis, lumbar region without neurogenic claudication    2. Immunosuppressed status    3. Other chronic pain    4. Mild intermittent asthma without complication    5. Pure hypercholesterolemia    6. Coronary artery disease involving native coronary artery of native heart without angina pectoris    7. Ureterocele    8. Candidal esophagitis    9. Sjogren's syndrome,  with unspecified organ involvement    10. Rheumatoid arthritis involving multiple sites, unspecified whether rheumatoid factor present    11. Iron deficiency anemia due to chronic blood loss    12. Thyroid nodule    13. Subclinical hyperthyroidism    14. Steroid-induced osteoporosis    15. Gastroesophageal reflux disease without esophagitis    16. Gastroparesis    17. NAFLD (nonalcoholic fatty liver disease)    18. 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 Immunosuppressed status    2. Spinal stenosis, lumbar region without neurogenic claudication    3. Other chronic pain    4. Mild intermittent asthma without complication    5. Pure hypercholesterolemia    6. Coronary artery disease involving native coronary artery of native heart without angina pectoris    7. Ureterocele    8. Candidal esophagitis    9. Sjogren's syndrome, with unspecified organ involvement    10. Rheumatoid arthritis involving multiple sites, unspecified whether rheumatoid factor present    11. Iron deficiency anemia due to chronic blood loss    12. Thyroid nodule    13. Subclinical hyperthyroidism    14. Steroid-induced osteoporosis    15. Gastroesophageal reflux disease without esophagitis    16. Gastroparesis    17. NAFLD (nonalcoholic fatty liver disease)    18. 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     Labs reviewed in detail  Continue healthy lifestyle efforts  Continue current meds as prescribed otherwise; refills per request  Keep routine specialist f/u   RTC in 6 months  with labs prior and/or PRN          Krystal Singleton   [facility]   1/22/24     I spent a total of >40 minutes on the day of the visit.This includes face to face time and non-face to face time preparing to see the patient (eg, review of tests), obtaining and/or reviewing separately obtained history, documenting clinical information in the electronic or other health record, independently interpreting results and communicating results to the  patient/family/caregiver, or care coordinator.

## 2024-01-31 ENCOUNTER — PATIENT MESSAGE (OUTPATIENT)
Dept: PAIN MEDICINE | Facility: CLINIC | Age: 64
End: 2024-01-31
Payer: MEDICARE

## 2024-01-31 DIAGNOSIS — I25.10 ATHEROSCLEROTIC HEART DISEASE OF NATIVE CORONARY ARTERY WITHOUT ANGINA PECTORIS: ICD-10-CM

## 2024-01-31 DIAGNOSIS — Z98.890 S/P SPINAL SURGERY: ICD-10-CM

## 2024-01-31 RX ORDER — NAPROXEN 500 MG/1
500 TABLET ORAL 2 TIMES DAILY PRN
Qty: 180 TABLET | Refills: 0 | Status: SHIPPED | OUTPATIENT
Start: 2024-01-31 | End: 2024-05-10

## 2024-01-31 RX ORDER — ROSUVASTATIN CALCIUM 20 MG/1
TABLET, COATED ORAL
Qty: 90 TABLET | Refills: 0 | Status: SHIPPED | OUTPATIENT
Start: 2024-01-31

## 2024-01-31 RX ORDER — OXYCODONE AND ACETAMINOPHEN 5; 325 MG/1; MG/1
1 TABLET ORAL EVERY 6 HOURS PRN
Qty: 120 TABLET | Refills: 0 | Status: SHIPPED | OUTPATIENT
Start: 2024-01-31 | End: 2024-02-29 | Stop reason: SDUPTHER

## 2024-01-31 RX ORDER — OMEPRAZOLE AND SODIUM BICARBONATE 40; 1100 MG/1; MG/1
CAPSULE ORAL
Qty: 90 CAPSULE | Refills: 3 | Status: SHIPPED | OUTPATIENT
Start: 2024-01-31

## 2024-01-31 NOTE — TELEPHONE ENCOUNTER
Patient requesting refill on oxycodone  Last office visit 1-16-24   shows last refill on 12-28-23  Patient does have a pain contract on file with [facility] department  Patient last UDS was not consistent with current therapy

## 2024-02-02 ENCOUNTER — PATIENT MESSAGE (OUTPATIENT)
Dept: PAIN MEDICINE | Facility: CLINIC | Age: 64
End: 2024-02-02
Payer: MEDICARE

## 2024-02-06 ENCOUNTER — PATIENT MESSAGE (OUTPATIENT)
Dept: ENDOCRINOLOGY | Facility: CLINIC | Age: 64
End: 2024-02-06
Payer: MEDICARE

## 2024-02-06 ENCOUNTER — TELEPHONE (OUTPATIENT)
Dept: HEPATOLOGY | Facility: CLINIC | Age: 64
End: 2024-02-06
Payer: MEDICARE

## 2024-02-06 NOTE — TELEPHONE ENCOUNTER
Reached out to pt in regards to r/s appt due to provider being out of the office. Pt vu and appt r/s

## 2024-02-07 ENCOUNTER — PATIENT MESSAGE (OUTPATIENT)
Dept: FAMILY MEDICINE | Facility: CLINIC | Age: 64
End: 2024-02-07
Payer: MEDICARE

## 2024-02-07 DIAGNOSIS — M48.061 SPINAL STENOSIS, LUMBAR REGION WITHOUT NEUROGENIC CLAUDICATION: Primary | ICD-10-CM

## 2024-02-07 DIAGNOSIS — M79.2 NEUROPATHIC PAIN OF LEFT FOOT: ICD-10-CM

## 2024-02-14 ENCOUNTER — PATIENT MESSAGE (OUTPATIENT)
Dept: RHEUMATOLOGY | Facility: CLINIC | Age: 64
End: 2024-02-14
Payer: MEDICARE

## 2024-02-14 ENCOUNTER — PATIENT MESSAGE (OUTPATIENT)
Dept: FAMILY MEDICINE | Facility: CLINIC | Age: 64
End: 2024-02-14
Payer: MEDICARE

## 2024-02-15 ENCOUNTER — TELEPHONE (OUTPATIENT)
Dept: FAMILY MEDICINE | Facility: CLINIC | Age: 64
End: 2024-02-15
Payer: MEDICARE

## 2024-02-15 ENCOUNTER — HOSPITAL ENCOUNTER (OUTPATIENT)
Dept: RADIOLOGY | Facility: HOSPITAL | Age: 64
Discharge: HOME | End: 2024-02-15
Attending: NURSE PRACTITIONER
Payer: MEDICARE

## 2024-02-15 ENCOUNTER — OFFICE VISIT (OUTPATIENT)
Dept: NEUROSURGERY | Facility: CLINIC | Age: 64
End: 2024-02-15
Payer: MEDICARE

## 2024-02-15 VITALS — HEART RATE: 92 BPM | TEMPERATURE: 98 F | SYSTOLIC BLOOD PRESSURE: 115 MMHG | DIASTOLIC BLOOD PRESSURE: 77 MMHG

## 2024-02-15 DIAGNOSIS — M54.16 LUMBAR RADICULOPATHY, CHRONIC: ICD-10-CM

## 2024-02-15 DIAGNOSIS — S32.009K PSEUDOARTHROSIS OF LUMBAR SPINE: ICD-10-CM

## 2024-02-15 DIAGNOSIS — M54.50 DORSALGIA OF LUMBAR REGION: ICD-10-CM

## 2024-02-15 DIAGNOSIS — M48.062 SPINAL STENOSIS OF LUMBAR REGION WITH NEUROGENIC CLAUDICATION: Primary | ICD-10-CM

## 2024-02-15 PROCEDURE — 72131 CT LUMBAR SPINE W/O DYE: CPT | Mod: TC

## 2024-02-15 PROCEDURE — 1159F MED LIST DOCD IN RCRD: CPT | Mod: CPTII,S$GLB,, | Performed by: PHYSICIAN ASSISTANT

## 2024-02-15 PROCEDURE — 72082 X-RAY EXAM ENTIRE SPI 2/3 VW: CPT | Mod: 26,,, | Performed by: RADIOLOGY

## 2024-02-15 PROCEDURE — 99205 OFFICE O/P NEW HI 60 MIN: CPT | Mod: S$GLB,,, | Performed by: PHYSICIAN ASSISTANT

## 2024-02-15 PROCEDURE — 72110 X-RAY EXAM L-2 SPINE 4/>VWS: CPT | Mod: TC

## 2024-02-15 PROCEDURE — 3074F SYST BP LT 130 MM HG: CPT | Mod: CPTII,S$GLB,, | Performed by: PHYSICIAN ASSISTANT

## 2024-02-15 PROCEDURE — 3078F DIAST BP <80 MM HG: CPT | Mod: CPTII,S$GLB,, | Performed by: PHYSICIAN ASSISTANT

## 2024-02-15 PROCEDURE — 72082 X-RAY EXAM ENTIRE SPI 2/3 VW: CPT | Mod: TC

## 2024-02-15 PROCEDURE — 72131 CT LUMBAR SPINE W/O DYE: CPT | Mod: 26,,, | Performed by: INTERNAL MEDICINE

## 2024-02-15 PROCEDURE — 99999 PR PBB SHADOW E&M-EST. PATIENT-LVL III: CPT | Mod: PBBFAC,,, | Performed by: PHYSICIAN ASSISTANT

## 2024-02-15 PROCEDURE — 72110 X-RAY EXAM L-2 SPINE 4/>VWS: CPT | Mod: 26,,, | Performed by: RADIOLOGY

## 2024-02-15 NOTE — TELEPHONE ENCOUNTER
----- Message from Sonia Flores sent at 2/15/2024 10:42 AM CST -----  Pt call back    Who called: pt  Who called for pt:  Best call back #:  [phone]  Add notes: pt requests for Dr. Singleton to please ceck her messages that she sent her via portal

## 2024-02-15 NOTE — PROGRESS NOTES
"Dear Jh Magaña MD,    Thank you for referring this patient to my clinic. The full details of my evaluation will also be forthcoming to you by letter.    CHIEF COMPLAINT:  ***    I, DESTINEE SALINAS, attest that this documentation has been prepared under the direction and in the presence of López Wyatt MD.    HPI from :  Joyce Peterson is a 63 y.o.  female with ***, who is referred to me by *** for evaluation of *** pain.     Patient denies any other complaints at this time.    Interval Hx:     Review of patient's allergies indicates:   Allergen Reactions    Actemra [tocilizumab] Other (See Comments)     Severe dizziness    Codeine Nausea And Vomiting and Hives    Gold au 198 Hives and Rash    Hydroxychloroquine Other (See Comments)     Can't remember the reaction      Iodinated contrast media Other (See Comments)     Other reaction(s): BURNING ALL OVER    Iodine Other (See Comments) and Hives     Other reaction(s): BURNING ALL OVER - Iodine dye - Not topical    Ondansetron Nausea And Vomiting    Sulfa (sulfonamide antibiotics) Other (See Comments)     Can't remember the reaction    Zofran [ondansetron hcl (pf)] Nausea And Vomiting     Pt reports that last time she received zofran she started vomiting again    Methotrexate analogues Nausea Only    Pneumococcal vaccine Other (See Comments)    Pneumovax 23 [pneumococcal 23-argenis ps vaccine] Other (See Comments)     "sick"    Methotrexate Nausea Only       Past Medical History:   Diagnosis Date    Acid reflux     Allergy     Anemia     Asthma     Coronary artery disease     CS (cervical spondylosis) 03/08/2013    Degenerative disc disease     Degenerative joint disease of hindfoot, left 6/28/2022    Dry eyes     Dry mouth     Gastroparesis     History of methotrexate therapy 01/19/2022    Hyperlipidemia     Lateral meniscus derangement 04/06/2016    Lobular carcinoma in situ     Lumbar spondylosis 03/08/2013    Osteoarthritis     Osteoporosis     Pes " planovalgus, acquired, left 6/28/2022    Rheumatoid arthritis(714.0)     Rupture of left triceps tendon 10/17/2018    Umbilical hernia 08/13/2015     Past Surgical History:   Procedure Laterality Date    BREAST BIOPSY Left 01/29/2002    core bx    CARPAL TUNNEL RELEASE Right 05/2017    CHOLECYSTECTOMY  2004    COLONOSCOPY      10/11    COLONOSCOPY N/A 6/29/2017    Procedure: COLONOSCOPY;  Surgeon: López Moore MD;  Location: [facility] ENDO (4TH FLR);  Service: Endoscopy;  Laterality: N/A;    EPIDURAL STEROID INJECTION N/A 11/18/2021    Procedure: INJECTION, STEROID, EPIDURAL, L5-S1 IL NEED CONSENT;  Surgeon: Tj Mayfield MD;  Location: [facility];  Service: Pain Management;  Laterality: N/A;    EPIDURAL STEROID INJECTION N/A 9/29/2022    Procedure: LUMBAR L3/L4 IL MADELINE CONTRAST;  Surgeon: Tj Mayfield MD;  Location: [facility];  Service: Pain Management;  Laterality: N/A;    EPIDURAL STEROID INJECTION N/A 12/12/2022    Procedure: INJECTION, STEROID, EPIDURAL L5/S1 IL CONTRAST;  Surgeon: Tj Mayfield MD;  Location: [facility];  Service: Pain Management;  Laterality: N/A;    EPIDURAL STEROID INJECTION N/A 4/6/2023    Procedure: INJECTION, STEROID, EPIDURAL L5/S1;  Surgeon: Tj Mayfield MD;  Location: [facility];  Service: Pain Management;  Laterality: N/A;    FOOT ARTHRODESIS Left 1/11/2023    Procedure: FUSION, FOOT;  Surgeon: Ariella Finnegan MD;  Location: [facility] OR;  Service: Orthopedics;  Laterality: Left;  nimbus    FUSION, SPINE, LUMBAR, TLIF, POSTERIOR APPROACH, USING PEDICLE SCREW N/A 6/22/2023    Procedure: FUSION, SPINE, LUMBAR, TLIF, POSTERIOR APPROACH, USING PEDICLE SCREW L4/5 spinewave SNS:SSEP/EMG;  Surgeon: Jh Mosqueda MD;  Location: [facility] OR 2ND FLR;  Service: Neurosurgery;  Laterality: N/A;    HARVESTING OF BONE GRAFT Left 1/11/2023    Procedure: SURGICAL PROCUREMENT, BONE GRAFT;  Surgeon: Ariella Finnegan MD;  Location: [facility] OR;  Service: Orthopedics;  Laterality: Left;    INJECTION OF  ANESTHETIC AGENT AROUND NERVE Bilateral 8/23/2021    Procedure: BLOCK, NERVE, MEDIAL BRANCH L3,L4,L5;  Surgeon: Tj Mayfield MD;  Location: [facility];  Service: Pain Management;  Laterality: Bilateral;  1 of 2    INJECTION OF ANESTHETIC AGENT AROUND NERVE Bilateral 8/26/2021    Procedure: BLOCK, NERVE, MEDIAL BRANCH L3,L4,L5;  Surgeon: Tj Mayfield MD;  Location: [facility];  Service: Pain Management;  Laterality: Bilateral;  2 of 2    INJECTION OF ANESTHETIC AGENT AROUND NERVE Left 7/7/2022    Procedure: BLOCK, NERVE, LEFT OBTURATOR AND FEMORAL;  Surgeon: Tj Mayfield MD;  Location: [facility];  Service: Pain Management;  Laterality: Left;    INJECTION OF FACET JOINT Bilateral 3/12/2020    Procedure: FACET JOINT INJECTION (LUMBAR BLOCK) BILATERAL L4-5 AND L5-S1 DIRECT REFERRAL;  Surgeon: Tj Mayfield MD;  Location: [facility];  Service: Pain Management;  Laterality: Bilateral;  NEEDS CONSENT    INJECTION OF FACET JOINT Bilateral 3/29/2021    Procedure: INJECTION, FACET JOINT L3/4, L4/5, L5/S1;  Surgeon: Tj Mayfield MD;  Location: [facility];  Service: Pain Management;  Laterality: Bilateral;    INJECTION OF JOINT Right 7/30/2020    Procedure: INJECTION, JOINT, RIGHT HIP Interarticular under flouro;  Surgeon: Tj Mayfield MD;  Location: [facility];  Service: Pain Management;  Laterality: Right;  INJECTION, JOINT, RIGHT HIP Interarticular under flouro    INJECTION OF JOINT Right 2/21/2022    Procedure: Injection, Joint RIGHT ISCHIAL BURSA;  Surgeon: Tj Mayfield MD;  Location: [facility] PAIN MGT;  Service: Pain Management;  Laterality: Right;    INTRAMEDULLARY RODDING OF FEMUR Left 5/21/2019    Procedure: INSERTION, INTRAMEDULLARY ARIEL, FEMUR;  Surgeon: López Duff MD;  Location: [facility] OR [address] [place] [address];  Service: Orthopedics;  Laterality: Left;    LENGTHENING OF TENDON OF LOWER EXTREMITY Left 1/11/2023    Procedure: LENGTHENING, TENDON, LOWER EXTREMITY;  Surgeon: Ariella Finnegan MD;  Location: [facility] OR;   Service: Orthopedics;  Laterality: Left;    MAGNETIC RESONANCE IMAGING N/A 5/29/2023    Procedure: MRI (Magnetic Resonance Imagine);  Surgeon: Sujey Surgeon;  Location: [facility];  Service: Anesthesiology;  Laterality: N/A;    MAGNETIC RESONANCE IMAGING N/A 1/10/2024    Procedure: MRI (Magnetic Resonance Imagine);  Surgeon: Sujey Robin;  Location: [facility];  Service: Anesthesiology;  Laterality: N/A;    RADIOFREQUENCY ABLATION Left 9/20/2021    Procedure: RADIOFREQUENCY ABLATION left L3,4,5 RFA  1 of 2  CONSENT NEEDED;  Surgeon: Tj Mayfield MD;  Location: [facility];  Service: Pain Management;  Laterality: Left;    RADIOFREQUENCY ABLATION Right 10/4/2021    Procedure: RADIOFREQUENCY ABLATION  right L3,4,5 RFA   2 of 2  CONSENT NEEDED;  Surgeon: Tj Mayfield MD;  Location: [facility];  Service: Pain Management;  Laterality: Right;    RADIOFREQUENCY ABLATION Left 4/21/2022    Procedure: Radiofrequency Ablation LEFT L3,L4,L5;  Surgeon: Tj Mayfield MD;  Location: [facility];  Service: Pain Management;  Laterality: Left;    RADIOFREQUENCY ABLATION Right 5/12/2022    Procedure: Radiofrequency Ablation RIGHT L3,L4,L5;  Surgeon: Tj Mayfield MD;  Location: [facility];  Service: Pain Management;  Laterality: Right;    RADIOFREQUENCY ABLATION Left 7/25/2022    Procedure: Radiofrequency Ablation Left Femoral & Obturator;  Surgeon: Tj Mayfield MD;  Location: [facility];  Service: Pain Management;  Laterality: Left;    REPAIR OF TRICEPS TENDON Left 10/17/2018    Procedure: REPAIR, TENDON, TRICEPS left elbow;  Surgeon: Staci Yarbrough MD;  Location: [address];  Service: Orthopedics;  Laterality: Left;  Anesthesia: General and regional. PRONE, k-wire , hand pan 1 and pan 2, CALL ARTHREX/Tamera notified 10-12 LO    TONSILLECTOMY      TRANSFORAMINAL EPIDURAL INJECTION OF STEROID Right 8/31/2020    Procedure: INJECTION, STEROID, EPIDURAL, TRANSFORAMINAL,  APPROACH, L3-L4 and L4-L5 need consent;   Surgeon: Tj Mayfield MD;  Location: [facility];  Service: Pain Management;  Laterality: Right;    TRANSFORAMINAL EPIDURAL INJECTION OF STEROID Bilateral 7/23/2021    Procedure: INJECTION, STEROID, EPIDURAL, TRANSFORAMINAL APPROACH L4/5;  Surgeon: Tj Mayfield MD;  Location: [facility];  Service: Pain Management;  Laterality: Bilateral;    TRANSFORAMINAL EPIDURAL INJECTION OF STEROID Bilateral 9/25/2023    Procedure: LUMBAR TRANSFORAMINAL BILATERAL  L2/3 DIRECT REFERRAL;  Surgeon: Tj Mayfield MD;  Location: [facility];  Service: Pain Management;  Laterality: Bilateral;    TRANSFORAMINAL EPIDURAL INJECTION OF STEROID Left 10/18/2023    Procedure: LUMBAR TRANSFORAMINAL LEFT L3/4 AND L4/5 * CLEARANCE IN CHART*;  Surgeon: Tj Mayfield MD;  Location: [facility];  Service: Pain Management;  Laterality: Left;    TRIGGER POINT INJECTION N/A 7/23/2021    Procedure: INJECTION, TRIGGER POINT SCAPULAR;  Surgeon: Tj Mayfield MD;  Location: [facility];  Service: Pain Management;  Laterality: N/A;    TUBAL LIGATION  2003    UPPER GASTROINTESTINAL ENDOSCOPY      10/11    uterine ablation  2003     Family History   Problem Relation Age of Onset    Cancer Mother         Lung Cancer    Emphysema Mother     Heart attack Mother     Alcohol abuse Mother     Cancer Father         Lung Cancer    Osteoarthritis Father     Lung cancer Father     Skin cancer Father     Alcohol abuse Father     Heart disease Brother     Heart attack Brother     Alcohol abuse Brother     Cirrhosis Brother     Osteoarthritis Paternal Aunt     Retinal detachment Maternal Aunt     No Known Problems Sister     No Known Problems Maternal Uncle     No Known Problems Paternal Uncle     No Known Problems Maternal Grandmother     No Known Problems Maternal Grandfather     No Known Problems Paternal Grandmother     No Known Problems Paternal Grandfather     Colon cancer Neg Hx     Esophageal cancer Neg Hx     Stomach cancer Neg Hx     Celiac  disease Neg Hx     Diabetes Neg Hx     Thyroid disease Neg Hx     Amblyopia Neg Hx     Blindness Neg Hx     Cataracts Neg Hx     Glaucoma Neg Hx     Hypertension Neg Hx     Macular degeneration Neg Hx     Strabismus Neg Hx     Stroke Neg Hx      Social History     Tobacco Use    Smoking status: Never    Smokeless tobacco: Never   Substance Use Topics    Alcohol use: Yes     Comment: 2-3 times per year.    Drug use: No        Review of Systems    OBJECTIVE:   Vital Signs:       Physical Exam:    Vital signs: All nursing notes and vital signs reviewed -- afebrile, vital signs stable.  Constitutional: Patient sitting comfortably in chair. Appears well developed and well nourished.  Skin: Exposed areas are intact without abnormal markings, rashes or other lesions.  HEENT: Normocephalic. Normal conjunctivae.  Cardiovascular: Normal rate and regular rhythm.  Respiratory: Chest wall rises and falls symmetrically, without signs of respiratory distress.  Abdomen: Soft and non-tender.  Extremities: Warm and without edema. Calves supple, non-tender.  Psych/Behavior: Normal affect.    Neurological:    Mental status: Alert and oriented. Conversational and appropriate.       Cranial Nerves: VFF to confrontation. PERRL. EOMI without nystagmus. Facial STLT normal and symmetric. Strong, symmetric muscles of mastication. Facial strength full and symmetric. Hearing equal bilaterally to finger rub. Palate and uvula rise and fall normally in midline. Shoulder shrug 5/5 strength. Tongue midline.     Motor:    Upper:  Deltoids Triceps Biceps WE WF     R 5/5 5/5 5/5 5/5 5/5 5/5    L 5/5 5/5 5/5 5/5 5/5 5/5      Lower:  HF KE KF DF PF EHL    R 5/5 5/5 5/5 5/5 5/5 5/5    L 5/5 5/5 5/5 5/5 5/5 5/5     Sensory: Intact sensation to light touch in all extremities. Romberg negative.    Reflexes:          DTR: 2+ symmetrically throughout.     Arriaga's: Negative.     Babinski's: Negative.     Clonus: Negative.    Cerebellar: Finger-to-nose and  rapid alternating movements normal. Gait stable, fluid.    Spine:    Posture: Head well aligned over pelvis in front and side views.  No focal or global spinal deformity visible on inspection. Shoulders and hips even. No obvious leg length discrepancy. No scapula winging.    Bending: Full ROM with forward, back and lateral bending. No rib prominence with forward bend.    Cervical:      ROM: Full with flexion, extension, lateral rotation and ear-to-shoulder bend.      Midline TTP: Negative.     Spurling's test: Negative.     Lhermitte's: Negative.    Thoracic:     Midline TTP: Negative    Lumbar:     Midline TTP: Negative     Straight Leg Test: Negative     Crossed Straight Leg Test: Negative     Sciatic notch tenderness: Negative.    Other:     SI joint TTP: Negative.     Greater trochanter TTP: Negative.     Tenderness with external/internal hip rotation: Negative.    Diagnostic Results:  All imaging was independently reviewed by me.    EMG/Nerve conduction study, dated ***:  1. ***    MRI *** spine, dated ***:  1. ***    CT *** spine, dated ***:  1. ***    AP and Lateral X-ray *** spine, dated ***:  1. ***    Flex/Ex X-ray *** spine, dated ***:  1. ***    Scoliosis standing AP and Lateral X-ray, dated ***:  1. ***  2. Measurements:    Major curve: *** degrees   Minor curve: *** degrees   Sagittal balance: ***cm   Coronal balance: ***cm   Pelvic Incidence:*** degrees   Pelvic Tilt:*** degrees   Thoracic kyphosis: *** degrees   Lumbar lordosis: *** degrees    ASSESSMENT/PLAN:     Joyce Peterson has ***.    The patient understands and agrees with the plan of care. All questions were answered.     1. ***  2. ***  3. ***    I, Dr. López Wyatt personally performed the services described in this documentation. All medical record entries made by the scribe, DESTINEE SALINAS, were at my direction and in my presence.  I have reviewed the chart and agree that the record reflects my personal performance and is accurate and  complete.      López Wyatt M.D.  Department of Neurosurgery  [facility]

## 2024-02-15 NOTE — TELEPHONE ENCOUNTER
----- Message from Ignacio Bell sent at 2/15/2024  1:02 PM CST -----  .Type:  Needs Medical Advice    Who Called: pt    Would the patient rather a call back or a response via [facility]? Call back  Best Call Back Number: [phone]  Additional Information:     Pt stated she missed a call and would like a call back please

## 2024-02-15 NOTE — PROGRESS NOTES
[facility] - Neurosurgery 8th Fl  Neurosurgery    SUBJECTIVE:     History of Present Illness:  Joyce Peterson is a 63 y.o. female with hx of RA with left foot fusion, CAD, steroid induced osteoporosis who presents for evaluation of back and leg pain. She is s/p L4-5 TLIF with Dr. Mosqueda 6/22/2023. Initially she did well and RLE pain had improved. Now c/o LLE pain for the last 6 months. Pain radiates from the back, hip, down lateral aspect of the leg and crosses medial along the shin to the foot. Worse with walking, standing. Improves slightly with leaning forward. She also notes 3 months of difficulty voiding. She is able to void but notes it is difficult to completely empty. Denies bowel incontinence, saddle anesthesia. She is unable to stand completely straight due to pain. She ambulates with a walker. She follows closely with Dr. Mayfield in pain management and responded well to prior ESIs. They are discussing RFA and SCS trial. She was recently seen by Dr. Mosqueda who recommended L2-pelvis fusion. She was on prolia for osteoporosis treatment but developed osteonecrosis of the jaw and will start forteo soon.       Review of patient's allergies indicates:   Allergen Reactions    Actemra [tocilizumab] Other (See Comments)     Severe dizziness    Codeine Nausea And Vomiting and Hives    Gold au 198 Hives and Rash    Hydroxychloroquine Other (See Comments)     Can't remember the reaction      Iodinated contrast media Other (See Comments)     Other reaction(s): BURNING ALL OVER    Iodine Other (See Comments) and Hives     Other reaction(s): BURNING ALL OVER - Iodine dye - Not topical    Ondansetron Nausea And Vomiting    Sulfa (sulfonamide antibiotics) Other (See Comments)     Can't remember the reaction    Zofran [ondansetron hcl (pf)] Nausea And Vomiting     Pt reports that last time she received zofran she started vomiting again    Methotrexate analogues Nausea Only    Pneumococcal vaccine Other (See Comments)     "Pneumovax 23 [pneumococcal 23-argenis ps vaccine] Other (See Comments)     "sick"    Methotrexate Nausea Only       Past Medical History:   Diagnosis Date    Acid reflux     Allergy     Anemia     Asthma     Coronary artery disease     CS (cervical spondylosis) 03/08/2013    Degenerative disc disease     Degenerative joint disease of hindfoot, left 6/28/2022    Dry eyes     Dry mouth     Gastroparesis     History of methotrexate therapy 01/19/2022    Hyperlipidemia     Lateral meniscus derangement 04/06/2016    Lobular carcinoma in situ     Lumbar spondylosis 03/08/2013    Osteoarthritis     Osteoporosis     Pes planovalgus, acquired, left 6/28/2022    Rheumatoid arthritis(714.0)     Rupture of left triceps tendon 10/17/2018    Umbilical hernia 08/13/2015       Past Surgical History:   Procedure Laterality Date    BREAST BIOPSY Left 01/29/2002    core bx    CARPAL TUNNEL RELEASE Right 05/2017    CHOLECYSTECTOMY  2004    COLONOSCOPY      10/11    COLONOSCOPY N/A 6/29/2017    Procedure: COLONOSCOPY;  Surgeon: López Moore MD;  Location: [facility] ENDO ([address] [place] [address]);  Service: Endoscopy;  Laterality: N/A;    EPIDURAL STEROID INJECTION N/A 11/18/2021    Procedure: INJECTION, STEROID, EPIDURAL, L5-S1 IL NEED CONSENT;  Surgeon: Tj Mayfield MD;  Location: [facility] PAIN MGT;  Service: Pain Management;  Laterality: N/A;    EPIDURAL STEROID INJECTION N/A 9/29/2022    Procedure: LUMBAR L3/L4 IL MADELINE CONTRAST;  Surgeon: Tj Mayfield MD;  Location: [facility] PAIN MGT;  Service: Pain Management;  Laterality: N/A;    EPIDURAL STEROID INJECTION N/A 12/12/2022    Procedure: INJECTION, STEROID, EPIDURAL L5/S1 IL CONTRAST;  Surgeon: Tj Mayfield MD;  Location: [facility] PAIN MGT;  Service: Pain Management;  Laterality: N/A;    EPIDURAL STEROID INJECTION N/A 4/6/2023    Procedure: INJECTION, STEROID, EPIDURAL L5/S1;  Surgeon: Tj Mayfield MD;  Location: [facility] PAIN MGT;  Service: Pain Management;  Laterality: N/A;    FOOT ARTHRODESIS Left 1/11/2023    Procedure: " FUSION, FOOT;  Surgeon: Ariella Finnegan MD;  Location: [facility] OR;  Service: Orthopedics;  Laterality: Left;  [facility]    FUSION, SPINE, LUMBAR, TLIF, POSTERIOR APPROACH, USING PEDICLE SCREW N/A 6/22/2023    Procedure: FUSION, SPINE, LUMBAR, TLIF, POSTERIOR APPROACH, USING PEDICLE SCREW L4/5 spinewave SNS:SSEP/EMG;  Surgeon: Jh Mosqueda MD;  [facility];  Service: Neurosurgery;  Laterality: N/A;    HARVESTING OF BONE GRAFT Left 1/11/2023    Procedure: SURGICAL PROCUREMENT, BONE GRAFT;  Surgeon: Ariella Finnegan MD;  Location: [facility] OR;  Service: Orthopedics;  Laterality: Left;    INJECTION OF ANESTHETIC AGENT AROUND NERVE Bilateral 8/23/2021    Procedure: BLOCK, NERVE, MEDIAL BRANCH L3,L4,L5;  Surgeon: Tj Mayfield MD;  Location: [facility] PAIN MGT;  Service: Pain Management;  Laterality: Bilateral;  1 of 2    INJECTION OF ANESTHETIC AGENT AROUND NERVE Bilateral 8/26/2021    Procedure: BLOCK, NERVE, MEDIAL BRANCH L3,L4,L5;  Surgeon: Tj Mayfield MD;  Location: [facility] PAIN MGT;  Service: Pain Management;  Laterality: Bilateral;  2 of 2    INJECTION OF ANESTHETIC AGENT AROUND NERVE Left 7/7/2022    Procedure: BLOCK, NERVE, LEFT OBTURATOR AND FEMORAL;  Surgeon: Tj Mayfield MD;  Location: [facility] PAIN MGT;  Service: Pain Management;  Laterality: Left;    INJECTION OF FACET JOINT Bilateral 3/12/2020    Procedure: FACET JOINT INJECTION (LUMBAR BLOCK) BILATERAL L4-5 AND L5-S1 DIRECT REFERRAL;  Surgeon: Tj Mayfield MD;  Location: [facility] PAIN MGT;  Service: Pain Management;  Laterality: Bilateral;  NEEDS CONSENT    INJECTION OF FACET JOINT Bilateral 3/29/2021    Procedure: INJECTION, FACET JOINT L3/4, L4/5, L5/S1;  Surgeon: Tj Mayfield MD;  Location: [facility] PAIN MGT;  Service: Pain Management;  Laterality: Bilateral;    INJECTION OF JOINT Right 7/30/2020    Procedure: INJECTION, JOINT, RIGHT HIP Interarticular under flouro;  Surgeon: Tj Mayfield MD;  Location: [facility] PAIN MGT;  Service: Pain Management;  Laterality: Right;   INJECTION, JOINT, RIGHT HIP Interarticular under flouro    INJECTION OF JOINT Right 2/21/2022    Procedure: Injection, Joint RIGHT ISCHIAL BURSA;  Surgeon: Tj Mayfield MD;  Location: [facility] PAIN MGT;  Service: Pain Management;  Laterality: Right;    INTRAMEDULLARY RODDING OF FEMUR Left 5/21/2019    Procedure: INSERTION, INTRAMEDULLARY ARIEL, FEMUR;  Surgeon: López Duff MD;  Location: [facility] OR 2ND FLR;  Service: Orthopedics;  Laterality: Left;    LENGTHENING OF TENDON OF LOWER EXTREMITY Left 1/11/2023    Procedure: LENGTHENING, TENDON, LOWER EXTREMITY;  Surgeon: Ariella Finnegan MD;  Location: [facility] OR;  Service: Orthopedics;  Laterality: Left;    MAGNETIC RESONANCE IMAGING N/A 5/29/2023    Procedure: MRI (Magnetic Resonance Imagine);  Surgeon: Sujey Surgeon;  Location: [facility];  Service: Anesthesiology;  Laterality: N/A;    MAGNETIC RESONANCE IMAGING N/A 1/10/2024    Procedure: MRI (Magnetic Resonance Imagine);  Surgeon: Sujey Robin;  Location: [facility];  Service: Anesthesiology;  Laterality: N/A;    RADIOFREQUENCY ABLATION Left 9/20/2021    Procedure: RADIOFREQUENCY ABLATION left L3,4,5 RFA  1 of 2  CONSENT NEEDED;  Surgeon: Tj Mayfield MD;  Location: [facility] PAIN MGT;  Service: Pain Management;  Laterality: Left;    RADIOFREQUENCY ABLATION Right 10/4/2021    Procedure: RADIOFREQUENCY ABLATION  right L3,4,5 RFA   2 of 2  CONSENT NEEDED;  Surgeon: Tj Mayfield MD;  Location: [facility] PAIN MGT;  Service: Pain Management;  Laterality: Right;    RADIOFREQUENCY ABLATION Left 4/21/2022    Procedure: Radiofrequency Ablation LEFT L3,L4,L5;  Surgeon: Tj Mayfield MD;  Location: [facility] PAIN MGT;  Service: Pain Management;  Laterality: Left;    RADIOFREQUENCY ABLATION Right 5/12/2022    Procedure: Radiofrequency Ablation RIGHT L3,L4,L5;  Surgeon: Tj Mayfield MD;  Location: [facility] PAIN MGT;  Service: Pain Management;  Laterality: Right;    RADIOFREQUENCY ABLATION Left 7/25/2022    Procedure: Radiofrequency Ablation Left Femoral &  Obturator;  Surgeon: Tj Mayfield MD;  Location: [facility] PAIN MGT;  Service: Pain Management;  Laterality: Left;    REPAIR OF TRICEPS TENDON Left 10/17/2018    Procedure: REPAIR, TENDON, TRICEPS left elbow;  Surgeon: Staci Yarbrough MD;  Location: [facility] OR 1ST FLR;  Service: Orthopedics;  Laterality: Left;  Anesthesia: General and regional. PRONE, k-wire , hand pan 1 and pan 2, CALL ARTHREX/Tamera notified 10-12 LO    TONSILLECTOMY      TRANSFORAMINAL EPIDURAL INJECTION OF STEROID Right 8/31/2020    Procedure: INJECTION, STEROID, EPIDURAL, TRANSFORAMINAL,  APPROACH, L3-L4 and L4-L5 need consent;  Surgeon: Tj Mayfield MD;  Location: [facility] PAIN MGT;  Service: Pain Management;  Laterality: Right;    TRANSFORAMINAL EPIDURAL INJECTION OF STEROID Bilateral 7/23/2021    Procedure: INJECTION, STEROID, EPIDURAL, TRANSFORAMINAL APPROACH L4/5;  Surgeon: Tj Mayfield MD;  Location: [facility] PAIN MGT;  Service: Pain Management;  Laterality: Bilateral;    TRANSFORAMINAL EPIDURAL INJECTION OF STEROID Bilateral 9/25/2023    Procedure: LUMBAR TRANSFORAMINAL BILATERAL  L2/3 DIRECT REFERRAL;  Surgeon: Tj Mayfield MD;  Location: [facility] PAIN MGT;  Service: Pain Management;  Laterality: Bilateral;    TRANSFORAMINAL EPIDURAL INJECTION OF STEROID Left 10/18/2023    Procedure: LUMBAR TRANSFORAMINAL LEFT L3/4 AND L4/5 * CLEARANCE IN CHART*;  Surgeon: Tj Mayfield MD;  Location: [facility] PAIN MGT;  Service: Pain Management;  Laterality: Left;    TRIGGER POINT INJECTION N/A 7/23/2021    Procedure: INJECTION, TRIGGER POINT SCAPULAR;  Surgeon: Tj Mayfield MD;  Location: [facility] PAIN MGT;  Service: Pain Management;  Laterality: N/A;    TUBAL LIGATION  2003    UPPER GASTROINTESTINAL ENDOSCOPY      10/11    uterine ablation  2003        Family History   Problem Relation Age of Onset    Cancer Mother         Lung Cancer    Emphysema Mother     Heart attack Mother     Alcohol abuse Mother     Cancer Father         Lung Cancer    Osteoarthritis  Father     Lung cancer Father     Skin cancer Father     Alcohol abuse Father     Heart disease Brother     Heart attack Brother     Alcohol abuse Brother     Cirrhosis Brother     Osteoarthritis Paternal Aunt     Retinal detachment Maternal Aunt     No Known Problems Sister     No Known Problems Maternal Uncle     No Known Problems Paternal Uncle     No Known Problems Maternal Grandmother     No Known Problems Maternal Grandfather     No Known Problems Paternal Grandmother     No Known Problems Paternal Grandfather     Colon cancer Neg Hx     Esophageal cancer Neg Hx     Stomach cancer Neg Hx     Celiac disease Neg Hx     Diabetes Neg Hx     Thyroid disease Neg Hx     Amblyopia Neg Hx     Blindness Neg Hx     Cataracts Neg Hx     Glaucoma Neg Hx     Hypertension Neg Hx     Macular degeneration Neg Hx     Strabismus Neg Hx     Stroke Neg Hx        Social History     Socioeconomic History    Marital status:    Tobacco Use    Smoking status: Never    Smokeless tobacco: Never   Substance and Sexual Activity    Alcohol use: Yes     Comment: 2-3 times per year.    Drug use: No    Sexual activity: Yes     Partners: Male     Birth control/protection: Surgical     Social Determinants of Health     Financial Resource Strain: Patient Declined (12/5/2023)    Overall Financial Resource Strain (CARDIA)     Difficulty of Paying Living Expenses: Patient declined   Food Insecurity: Patient Declined (12/5/2023)    Hunger Vital Sign     Worried About Running Out of Food in the Last Year: Patient declined     Ran Out of Food in the Last Year: Patient declined   Transportation Needs: Patient Declined (12/5/2023)    PRAPARE - Transportation     Lack of Transportation (Medical): Patient declined     Lack of Transportation (Non-Medical): Patient declined   Physical Activity: Unknown (12/5/2023)    Exercise Vital Sign     Days of Exercise per Week: Patient declined     Minutes of Exercise per Session: 0 min   Recent Concern:  Physical Activity - Inactive (9/6/2023)    Exercise Vital Sign     Days of Exercise per Week: 0 days     Minutes of Exercise per Session: 0 min   Stress: Patient Declined (12/5/2023)    [employer] - Occupational Stress Questionnaire     Feeling of Stress : Patient declined   Social Connections: Unknown (12/5/2023)    Social Connection and Isolation Panel [NHANES]     Frequency of Communication with Friends and Family: Patient declined     Frequency of Social Gatherings with Friends and Family: Patient declined     Attends Bahai Services: More than 4 times per year     Active Member of Clubs or Organizations: Patient declined     Attends Club or Organization Meetings: Patient declined     Marital Status: Patient declined   Housing Stability: Unknown (12/5/2023)    Housing Stability Vital Sign     Unable to Pay for Housing in the Last Year: Patient refused     Number of Places Lived in the Last Year: 1     Unstable Housing in the Last Year: No       Review of Systems:  Per HPI    OBJECTIVE:     Vital Signs (Most Recent):  Vitals:    02/15/24 1059   BP: 115/77   Pulse: 92   Temp: 97.8 °F (36.6 °C)       Physical Exam:  General: well developed, well nourished, no distress.   Head: normocephalic, atraumatic  Neurologic: Alert and oriented. Thought content appropriate.  GCS: Motor: 6/Verbal: 5/Eyes: 4 GCS Total: 15  Mental Status: Awake, Alert, Oriented x 4  Language: No aphasia  Speech: No dysarthria  Cranial nerves: face symmetric, tongue midline, CN II-XII grossly intact.   Eyes: pupils equal, round, reactive to light with accomodation, EOMI.  Pulmonary: normal respirations, no signs of respiratory distress  Abdomen: soft, non-distended, not tender to palpation  Sensory: intact to light touch throughout  Motor Strength: Moves all extremities spontaneously with good tone.  Full strength upper and lower extremities. No abnormal movements seen.     Strength  Deltoids Triceps Biceps Wrist  Extension Wrist Flexion Hand    Upper: R 5/5 5/5 5/5 5/5 5/5 5/5    L 5/5 5/5 5/5 5/5 5/5 5/5     Iliopsoas Quadriceps Knee  Flexion Tibialis  anterior Gastro- cnemius EHL   Lower: R 5/5 5/5 5/5 5/5 5/5 5/5    L 5/5 5/5 5/5 5/5 4+/5 5/5     Skin: Skin is warm, dry and intact.  Gait: antalgic       Diagnostic Results:  I have personally reviewed all pertinent imaging.    MRI lumbar spine w/wo contrast 1/10/24:  - large extruded disc L4-5 with severe canal and foraminal stenosis   - moderate canal stenosis L2-3, L3-4    CT lumbar spine 2/15/24:  - hardware lucency at L4-5 with severe canal stenosis 2/2 extruded disc     XR lumbar spine flex/ext 2/15/24:  - L4-5 anterolisthesis without dynamic instability     XR scoliosis 2/15/2024:  - levoscoliosis lumbar spine, 12 cm positive sagittal imbalance, 40 degrees spinopelvic mismatch     ASSESSMENT/PLAN:     Joyce Peterson is a 63 y.o. female s/p L4-5 TLIF with pseudoarthrosis, sagittal imbalance, lumbar stenosis with neurogenic claudication and radiculopathy. Patient was seen today by Dr. Wyatt. Agree with the recommended surgical plan from Dr. Mosqueda, can continue to follow-up with orthopedics for further surgical management. Neurosurgery can be available for assistance if needed per patient request. We discussed concerning signs and symptoms that would prompt return to clinic or urgent medical attention, patient v/u. All questions answered. Encouraged to call the clinic with questions/concerns prior to the next visit. F/u PRN.      Vidhi Schmidt PA-C  Neurosurgery      Note dictated with voice recognition software, please excuse any grammatical errors.

## 2024-02-19 ENCOUNTER — PATIENT MESSAGE (OUTPATIENT)
Dept: GASTROENTEROLOGY | Facility: CLINIC | Age: 64
End: 2024-02-19
Payer: MEDICARE

## 2024-02-19 DIAGNOSIS — M79.18 MYOFASCIAL PAIN: ICD-10-CM

## 2024-02-19 RX ORDER — METHOCARBAMOL 750 MG/1
750 TABLET, FILM COATED ORAL 4 TIMES DAILY
Qty: 120 TABLET | Refills: 0 | Status: CANCELLED | OUTPATIENT
Start: 2024-02-19 | End: 2024-03-20

## 2024-02-21 ENCOUNTER — PATIENT MESSAGE (OUTPATIENT)
Dept: PAIN MEDICINE | Facility: CLINIC | Age: 64
End: 2024-02-21
Payer: MEDICARE

## 2024-02-21 DIAGNOSIS — M79.18 MYOFASCIAL PAIN: ICD-10-CM

## 2024-02-21 RX ORDER — METHOCARBAMOL 750 MG/1
750 TABLET, FILM COATED ORAL 4 TIMES DAILY
Qty: 120 TABLET | Refills: 0 | Status: SHIPPED | OUTPATIENT
Start: 2024-02-21 | End: 2024-03-26 | Stop reason: SDUPTHER

## 2024-02-26 ENCOUNTER — PATIENT MESSAGE (OUTPATIENT)
Dept: ORTHOPEDICS | Facility: CLINIC | Age: 64
End: 2024-02-26
Payer: MEDICARE

## 2024-02-27 DIAGNOSIS — K31.84 GASTROPARESIS: Primary | ICD-10-CM

## 2024-02-27 DIAGNOSIS — Z00.6 PATIENT IN CLINICAL RESEARCH STUDY: ICD-10-CM

## 2024-02-28 ENCOUNTER — PATIENT MESSAGE (OUTPATIENT)
Dept: PAIN MEDICINE | Facility: CLINIC | Age: 64
End: 2024-02-28
Payer: MEDICARE

## 2024-02-28 DIAGNOSIS — M79.7 FIBROMYALGIA: ICD-10-CM

## 2024-02-28 RX ORDER — PREGABALIN 150 MG/1
150 CAPSULE ORAL 3 TIMES DAILY
Qty: 90 CAPSULE | Refills: 2 | Status: SHIPPED | OUTPATIENT
Start: 2024-02-28 | End: 2024-05-30 | Stop reason: SDUPTHER

## 2024-02-29 DIAGNOSIS — Z98.890 S/P SPINAL SURGERY: ICD-10-CM

## 2024-02-29 RX ORDER — FLUCONAZOLE 150 MG/1
TABLET ORAL
Qty: 1 TABLET | Refills: 1 | Status: SHIPPED | OUTPATIENT
Start: 2024-02-29

## 2024-02-29 RX ORDER — OXYCODONE AND ACETAMINOPHEN 5; 325 MG/1; MG/1
1 TABLET ORAL EVERY 6 HOURS PRN
Qty: 120 TABLET | Refills: 0 | Status: SHIPPED | OUTPATIENT
Start: 2024-02-29 | End: 2024-03-27 | Stop reason: SDUPTHER

## 2024-02-29 NOTE — TELEPHONE ENCOUNTER
Patient requesting refill on oxyCODONE-acetaminophen (PERCOCET) 5-325 mg   Last office visit 1/16/24   shows last refill on 02/01/24  Patient does have a pain contract on file with [facility] department  Patient last UDS none on file  was not consistent with current therapy

## 2024-03-04 DIAGNOSIS — M06.9 RHEUMATOID ARTHRITIS, INVOLVING UNSPECIFIED SITE, UNSPECIFIED WHETHER RHEUMATOID FACTOR PRESENT: ICD-10-CM

## 2024-03-04 RX ORDER — SARILUMAB 200 MG/1.14ML
200 INJECTION, SOLUTION SUBCUTANEOUS
Qty: 2.28 ML | Refills: 1 | Status: ACTIVE | OUTPATIENT
Start: 2024-03-04 | End: 2024-04-22 | Stop reason: SDUPTHER

## 2024-03-05 ENCOUNTER — PATIENT MESSAGE (OUTPATIENT)
Dept: RHEUMATOLOGY | Facility: CLINIC | Age: 64
End: 2024-03-05
Payer: MEDICARE

## 2024-03-06 ENCOUNTER — PATIENT OUTREACH (OUTPATIENT)
Dept: ADMINISTRATIVE | Facility: HOSPITAL | Age: 64
End: 2024-03-06
Payer: MEDICARE

## 2024-03-06 ENCOUNTER — PATIENT MESSAGE (OUTPATIENT)
Dept: GASTROENTEROLOGY | Facility: CLINIC | Age: 64
End: 2024-03-06
Payer: MEDICARE

## 2024-03-06 ENCOUNTER — OFFICE VISIT (OUTPATIENT)
Dept: ORTHOPEDICS | Facility: CLINIC | Age: 64
End: 2024-03-06
Payer: MEDICARE

## 2024-03-06 VITALS — HEIGHT: 61 IN | BODY MASS INDEX: 26.39 KG/M2 | WEIGHT: 139.75 LBS

## 2024-03-06 DIAGNOSIS — M54.16 LUMBAR RADICULOPATHY: Primary | ICD-10-CM

## 2024-03-06 PROCEDURE — 3008F BODY MASS INDEX DOCD: CPT | Mod: CPTII,S$GLB,, | Performed by: ORTHOPAEDIC SURGERY

## 2024-03-06 PROCEDURE — 99214 OFFICE O/P EST MOD 30 MIN: CPT | Mod: S$GLB,,, | Performed by: ORTHOPAEDIC SURGERY

## 2024-03-06 PROCEDURE — 99999 PR PBB SHADOW E&M-EST. PATIENT-LVL III: CPT | Mod: PBBFAC,,, | Performed by: ORTHOPAEDIC SURGERY

## 2024-03-06 NOTE — PROGRESS NOTES
Population Health Chart Review & Patient Outreach Details      Additional [facility] Notes:               Updates Requested / Reviewed:      Updated Care Coordination Note, Care Everywhere, , Care Team Updated, Removed  or Duplicate Orders, and Immunizations Reconciliation Completed or Queried: [place]         Health Maintenance Topics Overdue:    Health Maintenance Due   Topic Date Due    Sign Pain Contract  Never done    RSV Vaccine (Age 60+ and Pregnant patients) (1 - 1-dose 60+ series) Never done    TETANUS VACCINE  2022    Mammogram  2022    COVID-19 Vaccine (2023- season) 2023    Lipid Panel  10/03/2023         Health Maintenance Topic(s) Outreach Outcomes & Actions Taken:    Breast Cancer Screening - Outreach Outcomes & Actions Taken  : Pt Will Schedule with External Provider / Order Routed & Care Team Updated if Applicable

## 2024-03-08 ENCOUNTER — PATIENT MESSAGE (OUTPATIENT)
Dept: GASTROENTEROLOGY | Facility: CLINIC | Age: 64
End: 2024-03-08
Payer: MEDICARE

## 2024-03-10 DIAGNOSIS — K31.84 GASTROPARESIS: ICD-10-CM

## 2024-03-11 ENCOUNTER — HOSPITAL ENCOUNTER (OUTPATIENT)
Dept: CARDIOLOGY | Facility: CLINIC | Age: 64
Discharge: HOME | End: 2024-03-11
Payer: MEDICARE

## 2024-03-11 ENCOUNTER — PATIENT MESSAGE (OUTPATIENT)
Dept: GASTROENTEROLOGY | Facility: CLINIC | Age: 64
End: 2024-03-11

## 2024-03-11 ENCOUNTER — LAB VISIT (OUTPATIENT)
Dept: LAB | Facility: HOSPITAL | Age: 64
End: 2024-03-11
Attending: INTERNAL MEDICINE
Payer: MEDICARE

## 2024-03-11 ENCOUNTER — OFFICE VISIT (OUTPATIENT)
Dept: GASTROENTEROLOGY | Facility: CLINIC | Age: 64
End: 2024-03-11
Payer: MEDICARE

## 2024-03-11 VITALS
DIASTOLIC BLOOD PRESSURE: 78 MMHG | BODY MASS INDEX: 25.49 KG/M2 | WEIGHT: 135 LBS | HEIGHT: 61 IN | SYSTOLIC BLOOD PRESSURE: 134 MMHG | HEART RATE: 79 BPM

## 2024-03-11 DIAGNOSIS — K21.9 GASTROESOPHAGEAL REFLUX DISEASE WITHOUT ESOPHAGITIS: ICD-10-CM

## 2024-03-11 DIAGNOSIS — K31.84 GASTROPARESIS: ICD-10-CM

## 2024-03-11 DIAGNOSIS — B37.0 ORAL CANDIDIASIS: ICD-10-CM

## 2024-03-11 DIAGNOSIS — K31.84 GASTROPARESIS: Primary | ICD-10-CM

## 2024-03-11 DIAGNOSIS — Z00.6 RESEARCH STUDY PATIENT: ICD-10-CM

## 2024-03-11 LAB
ALBUMIN SERPL BCP-MCNC: 3.3 G/DL (ref 3.5–5.2)
ALP SERPL-CCNC: 257 U/L (ref 55–135)
ALT SERPL W/O P-5'-P-CCNC: 31 U/L (ref 10–44)
ANION GAP SERPL CALC-SCNC: 7 MMOL/L (ref 8–16)
AST SERPL-CCNC: 21 U/L (ref 10–40)
BASOPHILS # BLD AUTO: 0.07 K/UL (ref 0–0.2)
BASOPHILS NFR BLD: 0.8 % (ref 0–1.9)
BILIRUB SERPL-MCNC: 0.3 MG/DL (ref 0.1–1)
BUN SERPL-MCNC: 12 MG/DL (ref 8–23)
CALCIUM SERPL-MCNC: 9.7 MG/DL (ref 8.7–10.5)
CHLORIDE SERPL-SCNC: 105 MMOL/L (ref 95–110)
CO2 SERPL-SCNC: 27 MMOL/L (ref 23–29)
CREAT SERPL-MCNC: 0.6 MG/DL (ref 0.5–1.4)
DIFFERENTIAL METHOD BLD: ABNORMAL
EOSINOPHIL # BLD AUTO: 0 K/UL (ref 0–0.5)
EOSINOPHIL NFR BLD: 0.2 % (ref 0–8)
ERYTHROCYTE [DISTWIDTH] IN BLOOD BY AUTOMATED COUNT: 13.4 % (ref 11.5–14.5)
EST. GFR  (NO RACE VARIABLE): >60 ML/MIN/1.73 M^2
GLUCOSE SERPL-MCNC: 100 MG/DL (ref 70–110)
HCT VFR BLD AUTO: 37.4 % (ref 37–48.5)
HGB BLD-MCNC: 12 G/DL (ref 12–16)
IMM GRANULOCYTES # BLD AUTO: 0.02 K/UL (ref 0–0.04)
IMM GRANULOCYTES NFR BLD AUTO: 0.2 % (ref 0–0.5)
LYMPHOCYTES # BLD AUTO: 1.3 K/UL (ref 1–4.8)
LYMPHOCYTES NFR BLD: 13.5 % (ref 18–48)
MAGNESIUM SERPL-MCNC: 2 MG/DL (ref 1.6–2.6)
MCH RBC QN AUTO: 29.8 PG (ref 27–31)
MCHC RBC AUTO-ENTMCNC: 32.1 G/DL (ref 32–36)
MCV RBC AUTO: 93 FL (ref 82–98)
MONOCYTES # BLD AUTO: 0.8 K/UL (ref 0.3–1)
MONOCYTES NFR BLD: 8.2 % (ref 4–15)
NEUTROPHILS # BLD AUTO: 7.1 K/UL (ref 1.8–7.7)
NEUTROPHILS NFR BLD: 77.1 % (ref 38–73)
NRBC BLD-RTO: 0 /100 WBC
OHS QRS DURATION: 68 MS
OHS QTC CALCULATION: 452 MS
PHOSPHATE SERPL-MCNC: 2.6 MG/DL (ref 2.7–4.5)
PLATELET # BLD AUTO: 367 K/UL (ref 150–450)
PMV BLD AUTO: 10.2 FL (ref 9.2–12.9)
POTASSIUM SERPL-SCNC: 4.2 MMOL/L (ref 3.5–5.1)
PROT SERPL-MCNC: 6.5 G/DL (ref 6–8.4)
RBC # BLD AUTO: 4.03 M/UL (ref 4–5.4)
SODIUM SERPL-SCNC: 139 MMOL/L (ref 136–145)
WBC # BLD AUTO: 9.23 K/UL (ref 3.9–12.7)

## 2024-03-11 PROCEDURE — 85025 COMPLETE CBC W/AUTO DIFF WBC: CPT | Performed by: INTERNAL MEDICINE

## 2024-03-11 PROCEDURE — 93005 ELECTROCARDIOGRAM TRACING: CPT | Mod: S$GLB,,, | Performed by: INTERNAL MEDICINE

## 2024-03-11 PROCEDURE — 83735 ASSAY OF MAGNESIUM: CPT | Performed by: INTERNAL MEDICINE

## 2024-03-11 PROCEDURE — 99214 OFFICE O/P EST MOD 30 MIN: CPT | Mod: S$GLB,,, | Performed by: INTERNAL MEDICINE

## 2024-03-11 PROCEDURE — 93010 ELECTROCARDIOGRAM REPORT: CPT | Mod: S$GLB,,, | Performed by: INTERNAL MEDICINE

## 2024-03-11 PROCEDURE — 3078F DIAST BP <80 MM HG: CPT | Mod: CPTII,S$GLB,, | Performed by: INTERNAL MEDICINE

## 2024-03-11 PROCEDURE — 3008F BODY MASS INDEX DOCD: CPT | Mod: CPTII,S$GLB,, | Performed by: INTERNAL MEDICINE

## 2024-03-11 PROCEDURE — 84100 ASSAY OF PHOSPHORUS: CPT | Performed by: INTERNAL MEDICINE

## 2024-03-11 PROCEDURE — 1159F MED LIST DOCD IN RCRD: CPT | Mod: CPTII,S$GLB,, | Performed by: INTERNAL MEDICINE

## 2024-03-11 PROCEDURE — G2211 COMPLEX E/M VISIT ADD ON: HCPCS | Mod: S$GLB,,, | Performed by: INTERNAL MEDICINE

## 2024-03-11 PROCEDURE — 1160F RVW MEDS BY RX/DR IN RCRD: CPT | Mod: CPTII,S$GLB,, | Performed by: INTERNAL MEDICINE

## 2024-03-11 PROCEDURE — 99999 PR PBB SHADOW E&M-EST. PATIENT-LVL IV: CPT | Mod: PBBFAC,,, | Performed by: INTERNAL MEDICINE

## 2024-03-11 PROCEDURE — 3075F SYST BP GE 130 - 139MM HG: CPT | Mod: CPTII,S$GLB,, | Performed by: INTERNAL MEDICINE

## 2024-03-11 PROCEDURE — 80053 COMPREHEN METABOLIC PANEL: CPT | Performed by: INTERNAL MEDICINE

## 2024-03-11 PROCEDURE — 36415 COLL VENOUS BLD VENIPUNCTURE: CPT | Performed by: INTERNAL MEDICINE

## 2024-03-11 RX ORDER — SUCRALFATE 1 G/1
TABLET ORAL
Qty: 360 TABLET | Refills: 2 | Status: SHIPPED | OUTPATIENT
Start: 2024-03-11

## 2024-03-11 RX ORDER — FLUCONAZOLE 100 MG/1
100 TABLET ORAL DAILY
Qty: 14 TABLET | Refills: 3 | Status: SHIPPED | OUTPATIENT
Start: 2024-03-11 | End: 2024-05-02

## 2024-03-11 NOTE — PROGRESS NOTES
Subjective:       Patient ID: Joyce Peterson is a 63 y.o. female.    Chief Complaint: Follow-up (Gastroparesis)    Follow-up    63-year-old woman.  Follow-up visit.  She has idiopathic gastroparesis and is on the compassionate cisapride study for that.  She has had recent issues with complications of Prolia related to her jaw.  She developed jaw pain.  She is seen oral surgery.  She was given antibiotics and is anticipating either more prolonged antibiotics or even possible surgery for that.    After the antibiotic she developed some GI issues.  Curiously she became more constipated.  Right now she is doing stool softeners and probiotics to help with that.  Also she developed decreased appetite.  Her heartburn has been slightly more than usual.  She has a unsettled feeling in her stomach.  Early satiety she has had in the past.  Some mild nausea.  She raises her head of bed at night to help with this.      Right now for the reflux she is taking omeprazole twice a day plus sucralfate and both these medicines seem to help her.    She does have intermittent problems with thrush and esophageal candidiasis.  She feels like those are active now.  I will call in Diflucan but she is very aware that she can not take that at the same time as the cisapride.  Past Medical History:   Diagnosis Date    Acid reflux     Allergy     Anemia     Asthma     Coronary artery disease     CS (cervical spondylosis) 03/08/2013    Degenerative disc disease     Degenerative joint disease of hindfoot, left 6/28/2022    Dry eyes     Dry mouth     Gastroparesis     History of methotrexate therapy 01/19/2022    Hyperlipidemia     Lateral meniscus derangement 04/06/2016    Lobular carcinoma in situ     Lumbar spondylosis 03/08/2013    Osteoarthritis     Osteoporosis     Pes planovalgus, acquired, left 6/28/2022    Rheumatoid arthritis(714.0)     Rupture of left triceps tendon 10/17/2018    Umbilical hernia 08/13/2015       Review of patient's  "allergies indicates:   Allergen Reactions    Actemra [tocilizumab] Other (See Comments)     Severe dizziness    Codeine Nausea And Vomiting and Hives    Gold au 198 Hives and Rash    Hydroxychloroquine Other (See Comments)     Can't remember the reaction      Iodinated contrast media Other (See Comments)     Other reaction(s): BURNING ALL OVER    Iodine Other (See Comments) and Hives     Other reaction(s): BURNING ALL OVER - Iodine dye - Not topical    Ondansetron Nausea And Vomiting    Sulfa (sulfonamide antibiotics) Other (See Comments)     Can't remember the reaction    Zofran [ondansetron hcl (pf)] Nausea And Vomiting     Pt reports that last time she received zofran she started vomiting again    Methotrexate analogues Nausea Only    Pneumococcal vaccine Other (See Comments)    Pneumovax 23 [pneumococcal 23-argenis ps vaccine] Other (See Comments)     "sick"    Methotrexate Nausea Only        Family History   Problem Relation Age of Onset    Cancer Mother         Lung Cancer    Emphysema Mother     Heart attack Mother     Alcohol abuse Mother     Cancer Father         Lung Cancer    Osteoarthritis Father     Lung cancer Father     Skin cancer Father     Alcohol abuse Father     Heart disease Brother     Heart attack Brother     Alcohol abuse Brother     Cirrhosis Brother     Osteoarthritis Paternal Aunt     Retinal detachment Maternal Aunt     No Known Problems Sister     No Known Problems Maternal Uncle     No Known Problems Paternal Uncle     No Known Problems Maternal Grandmother     No Known Problems Maternal Grandfather     No Known Problems Paternal Grandmother     No Known Problems Paternal Grandfather     Colon cancer Neg Hx     Esophageal cancer Neg Hx     Stomach cancer Neg Hx     Celiac disease Neg Hx     Diabetes Neg Hx     Thyroid disease Neg Hx     Amblyopia Neg Hx     Blindness Neg Hx     Cataracts Neg Hx     Glaucoma Neg Hx     Hypertension Neg Hx     Macular degeneration Neg Hx     Strabismus Neg Hx "     Stroke Neg Hx        Social History     Tobacco Use    Smoking status: Never    Smokeless tobacco: Never   Substance Use Topics    Alcohol use: Yes     Comment: 2-3 times per year.    Drug use: No        Review of Systems    CMP   Lab Results   Component Value Date     03/11/2024     11/16/2023    K 4.2 03/11/2024    K 4.2 11/16/2023     03/11/2024     11/16/2023    CO2 27 03/11/2024    CO2 25 11/16/2023     03/11/2024    GLU 98 11/16/2023    BUN 12 03/11/2024    BUN 14 11/16/2023    CREATININE 0.6 03/11/2024    CREATININE 0.77 11/16/2023    CALCIUM 9.7 03/11/2024    CALCIUM 9.2 11/16/2023    PROT 6.5 03/11/2024    PROT 6.2 11/16/2023    ALBUMIN 3.3 (L) 03/11/2024    ALBUMIN 3.7 11/16/2023    BILITOT 0.3 03/11/2024    BILITOT 0.5 11/16/2023    ALKPHOS 257 (H) 03/11/2024    ALKPHOS 96 11/16/2023    AST 21 03/11/2024    AST 33 11/16/2023    ALT 31 03/11/2024    ALT 30 11/16/2023    ANIONGAP 7 (L) 03/11/2024    ANIONGAP 9 11/16/2023    and CBC   Lab Results   Component Value Date    WBC 9.23 03/11/2024    WBC 5.28 11/28/2023    WBC 5.05 11/16/2023    HGB 12.0 03/11/2024    HGB 11.5 (L) 11/28/2023    HGB 11.8 (L) 11/16/2023    HCT 37.4 03/11/2024     03/11/2024     11/28/2023     11/16/2023       No results found for this or any previous visit from the past 365 days.             Objective:      Physical Exam  Cardiovascular:      Rate and Rhythm: Normal rate and regular rhythm.   Pulmonary:      Effort: Pulmonary effort is normal.      Breath sounds: Normal breath sounds.   Abdominal:      General: Abdomen is flat. Bowel sounds are normal.      Palpations: Abdomen is soft.         Assessment & Plan:       Gastroparesis    Gastroesophageal reflux disease without esophagitis    Research study patient    Oral candidiasis    Other orders  -     fluconazole (DIFLUCAN) 100 MG tablet; Take 1 tablet (100 mg total) by mouth once daily.  Dispense: 14 tablet; Refill: 3      Assessment.  Idiopathic gastroparesis treated with the compassionate trial of cisapride.  We will give her Diflucan for her esophageal candidiasis and she will stop the cisapride during that time and not begin until after she is completed her treatment.  She will also let us know what antibiotic she is put on for her upcoming jaw infection, as there may be other conflicts with the cisapride.  She definitely feels like the cisapride helps her symptoms.  She has to be very very careful on her diet when she has to stop the cisapride.  And even despite the cisapride her reflux symptoms are fairly severe but she does not have esophagitis on EGD and the PPI seems to at least help the symptoms some        Visit today included increased complexity associated with the care of the episodic problem gastroparesis and research study. addressed and managing the longitudinal care of the patient due to the serious and/or complex managed problem(s) gastroparesis and research study    This note was created with voice recognition dictation technology.  There may be errors that I did not see, detect or correct.  .    López Moore MD

## 2024-03-11 NOTE — PROGRESS NOTES
"GENERAL GI PATIENT INTAKE:    COVID symptoms in the last 7 days (runny nose, sore throat, congestion, cough, fever): No  PCP: Krystal Singleton  If not PCP-  number given to establish [phone]: N/A    ALLERGIES REVIEWED:  Yes    CHIEF COMPLAINT:    Chief Complaint   Patient presents with    Follow-up     Gastroparesis       VITAL SIGNS:  /78   Pulse 79   Ht 5' 1" (1.549 m)   Wt 61.2 kg (135 lb)   LMP  (LMP Unknown)   BMI 25.51 kg/m²      Change in medical, surgical, family or social history: No      REVIEWED MEDICATION LIST RECONCILED INCLUDING ABOVE MEDS:  Yes     "

## 2024-03-12 ENCOUNTER — TELEPHONE (OUTPATIENT)
Dept: PAIN MEDICINE | Facility: CLINIC | Age: 64
End: 2024-03-12
Payer: MEDICARE

## 2024-03-12 NOTE — TELEPHONE ENCOUNTER
----- Message from Quinn Alvarenga sent at 3/12/2024  2:36 PM CDT -----  Regarding: RETURN CALL  Who Called: TONNY GOMEZ [[id number]]        Who Left Message for Patient:unk        Does the patient know what this is regarding?yes        Best Call Back Number:[phone]         Additional Information:

## 2024-03-13 ENCOUNTER — PATIENT MESSAGE (OUTPATIENT)
Dept: PAIN MEDICINE | Facility: CLINIC | Age: 64
End: 2024-03-13
Payer: MEDICARE

## 2024-03-13 NOTE — PROGRESS NOTES
Date of Surgery: 6/22/2023    Procedure: L4/5 TLIF    History: Joyce Peterson is seen today for follow-up following the above listed procedure.  She has a complicated history.  Unfortunately she has developed a large recurrent left-sided L4-5 disc herniation.  She also has known rheumatoid arthritis, and bilateral lower extremity radiculopathy.  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    Exam:  Incision is healed   There is no sign of infection. Neuro exam is stable. No signs of DVT.  No pain with range of motion of the bilateral hips.     Radiographs:  Today I reviewed her prior MRI of the lumbar spine demonstrates a large recurrent left-sided L4-5 disc herniation at the level of her surgery.  She has known L2-3 and L3-4 stenosis as well.    Assessment/Plan:  Today we discussed options, clearly she is in pain because of her recurrent disc herniation, as well as adjacent segment pathology.  I have recommended that we consider delaying surgery until she gets her jaw issues sorted out, specifically if she needs found prolonged antibiotics.

## 2024-03-14 RX ORDER — OMEPRAZOLE 40 MG/1
40 CAPSULE, DELAYED RELEASE ORAL EVERY MORNING
Qty: 30 CAPSULE | Refills: 6 | Status: SHIPPED | OUTPATIENT
Start: 2024-03-14

## 2024-03-21 ENCOUNTER — OFFICE VISIT (OUTPATIENT)
Dept: HEMATOLOGY/ONCOLOGY | Facility: CLINIC | Age: 64
End: 2024-03-21
Payer: MEDICARE

## 2024-03-21 DIAGNOSIS — D50.0 IRON DEFICIENCY ANEMIA DUE TO CHRONIC BLOOD LOSS: Primary | ICD-10-CM

## 2024-03-21 PROCEDURE — 99441 PR PHYSICIAN TELEPHONE EVALUATION 5-10 MIN: CPT | Mod: 95,,, | Performed by: INTERNAL MEDICINE

## 2024-03-21 NOTE — Clinical Note
Cbc, ferritin lab only in 3 month sand 6 months at [facility] appt day after labs in 6 months

## 2024-03-21 NOTE — PROGRESS NOTES
Notes:   The patient location is: home  The chief complaint leading to consultation is: ROBERT    Visit type: audio only    Face to Face time with patient: 10  15  minutes of total time spent on the encounter, which includes face to face time and non-face to face time preparing to see the patient (eg, review of tests), Obtaining and/or reviewing separately obtained history, Documenting clinical information in the electronic or other health record, Independently interpreting results (not separately reported) and communicating results to the patient/family/caregiver, or Care coordination (not separately reported).         Each patient to whom he or she provides medical services by telemedicine is:  (1) informed of the relationship between the physician and patient and the respective role of any other health care provider with respect to management of the patient; and (2) notified that he or she may decline to receive medical services by telemedicine and may withdraw from such care at any time.    Notes:         SECTION OF HEMATOLOGY AND BONE MARROW TRANSPLANT  Return  Patient Visit   03/21/2024  Referred by:  Dr. Manjeet Woodson  Referred for: ROBERT    CHIEF COMPLAINT:   No chief complaint on file.      HISTORY OF PRESENT ILLNESS:   63 y.o. female Referred to me November 2017  for ROBERT.  Intolerant to po iron.  Iron studies in march 2017 with severe ID.  History RA followed by Dr. Valverde.  History of gastroparesis followed by Dr. Mooer in GI.  Denies fever, chills, nightsweats, bleeding, brusing, lymphadenopathy, signs/symptoms of splenomegaly.   Had upper and lower endoscopy summer 2017 unremarkable.  No VCE done.  Denies any overt GI bleeding or other bleeding.    S/p repeat IV feraheme x 2 January 2018,  may 2019 and oct/nov 2019.  Tolerated well.   Maintained ferritin and hgb since that time. Continues close fu with rheum for RA and osteoperosis.      Interval history: Ms. Peterson presentsfor surveillance fu  for ROBERT.    Some fatigue. No bleeding of any kind.     physical exam deferred due to telemed   .    Lab Results   Component Value Date    WBC 3.50 (L) 03/19/2024    HGB 12.1 03/19/2024    HCT 36.9 (L) 03/19/2024    MCV 91 03/19/2024     03/19/2024       Lab Results   Component Value Date    IRON 109 03/19/2024    TIBC 293 03/19/2024    FERRITIN 357 (H) 03/19/2024     Lab Results   Component Value Date    FERRITIN 357 (H) 03/19/2024       ROBERT  Microcytic anemia likely ROBERT  due to malabsorption related to GI issues; possible anemia of chronic inflammation from RA and meds contributing  Denies any over GI bleeding; Recent summer 2017 upper and lower GI endoscopy negative; VCE deferred but may need if ROBERT persists  Intolerant to po iron   Requires intermittent iron infusions;  last dec 2023  Infusions at [facility] infusion   Labs in [place] and kylah CANCINO appt   3/19 with normal hemogram/iron studies  Continues surveillance     Follow-up:       Cbc, ferritin lab only in 3 month sand 6 months at [place] lab  Kyl[facility]CINO appt day after labs in 6 months

## 2024-03-26 DIAGNOSIS — M79.18 MYOFASCIAL PAIN: ICD-10-CM

## 2024-03-27 ENCOUNTER — PATIENT MESSAGE (OUTPATIENT)
Dept: PAIN MEDICINE | Facility: CLINIC | Age: 64
End: 2024-03-27
Payer: MEDICARE

## 2024-03-27 DIAGNOSIS — M06.9 RHEUMATOID ARTHRITIS: ICD-10-CM

## 2024-03-27 DIAGNOSIS — Z98.890 S/P SPINAL SURGERY: ICD-10-CM

## 2024-03-27 RX ORDER — METHOCARBAMOL 750 MG/1
750 TABLET, FILM COATED ORAL 4 TIMES DAILY
Qty: 120 TABLET | Refills: 0 | Status: SHIPPED | OUTPATIENT
Start: 2024-03-27 | End: 2024-04-18 | Stop reason: SDUPTHER

## 2024-03-27 RX ORDER — OXYCODONE AND ACETAMINOPHEN 5; 325 MG/1; MG/1
1 TABLET ORAL EVERY 6 HOURS PRN
Qty: 120 TABLET | Refills: 0 | Status: SHIPPED | OUTPATIENT
Start: 2024-03-27 | End: 2024-04-28

## 2024-03-27 NOTE — TELEPHONE ENCOUNTER
Patient requesting refill on oxyCODONE-acetaminophen (PERCOCET) 5-325 mg   Last office visit 1/16/24   shows last refill on 03/01/24  Patient does have a pain contract on file with [facility]  Patient last UDS none on file  was not consistent with current therapy

## 2024-03-30 ENCOUNTER — TELEPHONE (OUTPATIENT)
Dept: RHEUMATOLOGY | Facility: CLINIC | Age: 64
End: 2024-03-30
Payer: MEDICARE

## 2024-03-30 DIAGNOSIS — E78.5 HYPERLIPIDEMIA, UNSPECIFIED HYPERLIPIDEMIA TYPE: Primary | ICD-10-CM

## 2024-03-30 RX ORDER — ROSUVASTATIN CALCIUM 20 MG/1
TABLET, COATED ORAL
Qty: 90 TABLET | Refills: 3 | Status: SHIPPED | OUTPATIENT
Start: 2024-03-30

## 2024-03-30 RX ORDER — LEFLUNOMIDE 20 MG/1
20 TABLET ORAL DAILY
Qty: 90 TABLET | Refills: 0 | Status: SHIPPED | OUTPATIENT
Start: 2024-03-30 | End: 2024-04-10 | Stop reason: SDUPTHER

## 2024-04-10 ENCOUNTER — OFFICE VISIT (OUTPATIENT)
Dept: RHEUMATOLOGY | Facility: CLINIC | Age: 64
End: 2024-04-10
Payer: MEDICARE

## 2024-04-10 ENCOUNTER — PATIENT MESSAGE (OUTPATIENT)
Dept: PAIN MEDICINE | Facility: CLINIC | Age: 64
End: 2024-04-10
Payer: MEDICARE

## 2024-04-10 VITALS
DIASTOLIC BLOOD PRESSURE: 76 MMHG | HEART RATE: 81 BPM | BODY MASS INDEX: 26.85 KG/M2 | HEIGHT: 61 IN | SYSTOLIC BLOOD PRESSURE: 123 MMHG | WEIGHT: 142.19 LBS

## 2024-04-10 DIAGNOSIS — M81.0 OSTEOPOROSIS, UNSPECIFIED OSTEOPOROSIS TYPE, UNSPECIFIED PATHOLOGICAL FRACTURE PRESENCE: Primary | ICD-10-CM

## 2024-04-10 DIAGNOSIS — M06.9 RHEUMATOID ARTHRITIS: ICD-10-CM

## 2024-04-10 PROCEDURE — 1159F MED LIST DOCD IN RCRD: CPT | Mod: CPTII,S$GLB,, | Performed by: INTERNAL MEDICINE

## 2024-04-10 PROCEDURE — 3078F DIAST BP <80 MM HG: CPT | Mod: CPTII,S$GLB,, | Performed by: INTERNAL MEDICINE

## 2024-04-10 PROCEDURE — 99213 OFFICE O/P EST LOW 20 MIN: CPT | Mod: S$GLB,,, | Performed by: INTERNAL MEDICINE

## 2024-04-10 PROCEDURE — 99999 PR PBB SHADOW E&M-EST. PATIENT-LVL V: CPT | Mod: PBBFAC,,, | Performed by: INTERNAL MEDICINE

## 2024-04-10 PROCEDURE — 3074F SYST BP LT 130 MM HG: CPT | Mod: CPTII,S$GLB,, | Performed by: INTERNAL MEDICINE

## 2024-04-10 PROCEDURE — 3008F BODY MASS INDEX DOCD: CPT | Mod: CPTII,S$GLB,, | Performed by: INTERNAL MEDICINE

## 2024-04-10 RX ORDER — LEFLUNOMIDE 20 MG/1
20 TABLET ORAL DAILY
Qty: 90 TABLET | Refills: 0 | Status: SHIPPED | OUTPATIENT
Start: 2024-04-10

## 2024-04-10 NOTE — PROGRESS NOTES
I have personally taken the history and examined the patient and agree with the resident,s note as stated above        RA has been in remission  Major issue at present is recurrent left lumbar radiculopathy and now intermittent right lumbar radiculopathy. Has been told will need repeat lumbar surgery after 6 wks of antibiotics for ONJ/osteomyelitis right lower molar/maxillary area    Other main issue is right lower molar ONJ, bone biopsy 3/14/24 with cultures positive for oxacillin resistant Staph. Ep, Streptococcus ESBL E. Coli  Dr. Deirdre Bernstein plans 6 wk course of daptomycin 8mg/kg IV daily and ertapenem 1g IV daily  via PICC line      Dr. Lane ordered another course of teriparatide in Jan but pt just recently started.    Latest Reference Range & Units 04/05/24 10:15   WBC 3.90 - 12.70 K/uL 4.57   RBC 4.00 - 5.40 M/uL 4.08   Hemoglobin 12.0 - 16.0 g/dL 12.4   Hematocrit 37.0 - 48.5 % 38.8   MCV 82 - 98 fL 95   MCH 27.0 - 31.0 pg 30.4   MCHC 32.0 - 36.0 g/dL 32.0   RDW 11.5 - 14.5 % 13.6   Platelet Count 150 - 450 K/uL 230   MPV 9.2 - 12.9 fL 10.8   Gran % 38.0 - 73.0 % 60.7   Lymph % 18.0 - 48.0 % 24.5   Mono % 4.0 - 15.0 % 12.9   Eos % 0.0 - 8.0 % 0.4   Basophil % 0.0 - 1.9 % 1.3   Immature Granulocytes 0.0 - 0.5 % 0.2   Gran # (ANC) 1.8 - 7.7 K/uL 2.8   Lymph # 1.0 - 4.8 K/uL 1.1   Mono # 0.3 - 1.0 K/uL 0.6   Eos # 0.0 - 0.5 K/uL 0.0   Baso # 0.00 - 0.20 K/uL 0.06   Immature Grans (Abs) 0.00 - 0.04 K/uL 0.01   nRBC 0 /100 WBC 0   Differential Method  Automated   Sed Rate 0 - 20 mm/Hr 2   Sodium 136 - 145 mmol/L 140   Potassium 3.5 - 5.1 mmol/L 4.1   Chloride 95 - 110 mmol/L 107   CO2 23 - 29 mmol/L 25   Anion Gap 8 - 16 mmol/L 8   BUN 8 - 23 mg/dL 10   Creatinine 0.5 - 1.4 mg/dL 0.7   eGFR >60 mL/min/1.73 m^2 >60.0   Glucose 70 - 110 mg/dL 97   Calcium 8.7 - 10.5 mg/dL 8.9   ALP 55 - 135 U/L 89   PROTEIN TOTAL 6.0 - 8.4 g/dL 6.0   Albumin 3.5 - 5.2 g/dL 3.8   BILIRUBIN TOTAL 0.1 - 1.0 mg/dL 0.4   AST  10 - 40 U/L 27   ALT 10 - 44 U/L 29   CRP 0.0 - 8.2 mg/L <0.3     DATE OF PROCEDURE: 6/22/23     SURGEON: Jh Mosqueda M.D.      FIRST ASSISTANT-SURGEON: Krystal Vargas PA-C, note no resident was available.     PREOPERATIVE DIAGNOSIS:  1.  L4-5  spondylolisthesis, severe lumbar stenosis, and refractory lumbar radiculopathy     POSTOPERATIVE DIAGNOSIS:   1.  L4-5  spondylolisthesis, severe lumbar stenosis, and refractory lumbar radiculopathy     PROCEDURES PERFORMED:   Combined posterolateral and posterior lumbar interbody fusion  L4-5  2.   Posterior nonsegmental instrumentation L4-5  3.   Application of titanium intervertebral spacer device L4-5 disc defect  4.   L4 laminectomy for decompression of central and bilateral foraminal stenosis  5.   Local bone graft        Thyroid US 1/22/24:1. Single nodule within either lobe of the thyroid gland.  No nodules meeting criteria for FNA or follow-up per TI rads criteria.        Electronically signed by: Kashif Rodriguez DO  Date:                                            01/22/2024  Time:                                           15:19      ASSESSMENT:           erosive RA discontinued long term methotrexate b/o nausea with po and sc and reduced dose  , allergic to sulfa,   and had reaction to hydroxychloroquine;  Has failed 3 TNFi(infliximab, etanercept, adalimumab),   Abatacept ineffective   ituximab hypogammaglobulinemia and frequent infections in any event, acute vertigo/dizziness with tocilizumab x2  Failed tofacitinib  Tocilizumab , acute severe vertigo  Currently on Sarilumab, leflunomide     Future option would be upadacitinib          RA  TJ 0  SJ 0   Pt global  ESR 2 CRP <0.3 DAS28  [id number]  CDAI driven by bilateral lumbar radiculopathies    Rheumatoid Arthritis Treatment Goals:  -Disease Activity Measure:CDAI   -Target: LDA  -Patient Goal:  -Therapy/Change: none, activity scores driven by back symptoms, neck symptoms, fibromyalgia , right shoulder, OA  right knee, not RA. No need to change sarilumab to upadacitinib  -Shared Decision Making: Discussed goals of care and therapy plan together with patient as outlined above.      Osteoporosis DXA 12/20/23 : normal BMD but  with FRAX : hip 1.8% major 25%  : completed 2 years of teriparatide added to denosumab, then  denosumab alone but ONJ and denosumab stopped(last dose 12/8/23) teriparatide resumed per Dr. Lane  ONJ while on denosumab  S/p L4-5 interbody fusion as above 6/22/23 with recurrent left lumbar and now right lumbar radiculopathy, with planned revision surgery   Subclinical hyperthyroidism due for TSH  Thyroid nodule  Fibromyalgia;   OA  Right hip pain with small effusion, mild degenerative changes, anterior-superior labral tear, partial tears gluteus minimus and medius tendons, greater trochanteric bursiti, subtotal tear proximal hamstring on MRI 4/25/23  Right shoulder pain with rotator cuff tendinitis ?tear  OA with Right knee effusion  Lipids  LDL 79.2  4/8/24 on rosuvastatin 20mg nightly         TSH as ordered by Dr. Lane  vitamin D  *Covid vaccine  *Prevnar 20  *RSV vaccine  *Tdap   Hold sarilumab while on IV antibiotics  Hold sarilumab 2 wks pre-op(surgery to be done 3rd week after holding) and resume 2 wks post op  Continue leflunomide 20mg daily  Continue prednisone  5 mg daily  Continue teriparatide as resumed by Dr. Lane Jan 2024 in place of denosumab given denosumab related ONJ  Continue 1200mg dietary calcium daily   RTC 3 months with standing labs

## 2024-04-10 NOTE — PATIENT INSTRUCTIONS
TSH as ordered by Dr. Lane  vitamin D  *Covid vaccine  *Prevnar 20  *RSV vaccine  *Tdap   Hold sarilumab while on IV antibiotics  Hold sarilumab 2 wks pre-op(surgery to be done 3rd week after holding) and resume 2 wks post op  Continue leflunomide 20mg daily  Continue prednisone  5 mg daily  Continue teriparatide as resumed by Dr. Lane Jan 2024 in place of denosumab given denosumab related ONJ  Continue 1200mg dietary calcium daily   RTC 3 months with standing labs

## 2024-04-10 NOTE — PROGRESS NOTES
Patient ID:  Joyce Peterson    YOB: 1960     MRN:  [id number]     Subjective:     Chief Complaint:  rheumatoid arthritis management     History of Present Illness:  Patient is a 63 y.o. female with RA, osteoporosis who presents today for follow up. LCV was virtual on 9/6/2023.     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 She completed chlorhexidine mouth washes. She is now following with ID to complete 6 weeks of IV antibiotics at home following biopsy results of the jaw. She has not yet started the abx, but she was counseled today on stopping Kevzara when she is on abx. She has started taking Forteo again this past Monday. It previously gave her nausea during the 2 years she used it in the past.    OP medication history:   Prolia 8/2017 - 12/2023  Forteo from Jan, 2020 - Jan, 2022; tolerated well except for nausea sometimes.    She denies any morning stiffness or swelling in her hands or wrists and notes her RA is doing well.    She had L4-5 partial discectomy and fusion in 7/2023 which provided R radicular pain relief for 2 months. She then started having L radicular pain and now the R pain is returning as well. She is taking ibuprofen, opioids, and robaxin daily for back pain. She has followed up with several spine surgeons and plans to complete a revision of the low back surgery to have complete discectomy once her osteonecrosis has resolved. She should stop Kevzara 2 weeks before and after surgery.    Review of Systems   Review of Systems   Constitutional:  Negative for fever.   HENT:  Negative for sneezing and sore throat.         Jaw pain from osteonecrosis   Eyes:  Negative for pain and redness.   Respiratory:  Negative for cough, shortness of breath and wheezing.    Cardiovascular:  Negative for chest pain.   Gastrointestinal:  Negative for abdominal pain and vomiting.   Musculoskeletal:  Positive for  "back pain, gait problem and neck pain. Negative for joint swelling and neck stiffness.        Positive for low back pain without radiculopathy, right shoulder pain and right hip pain. Positive for right knee swelling.   Skin:  Negative for rash.   Neurological:  Positive for numbness (occasional numbness in R shoulder to forearm and bilateral radicular pain in LE). Negative for headaches.        Current Medications:    Current Outpatient Medications:     albuterol (PROVENTIL) 2.5 mg /3 mL (0.083 %) nebulizer solution, Take 3 mLs (2.5 mg total) by nebulization every 6 (six) hours as needed for Wheezing. Rescue, Disp: 75 mL, Rfl: 0    albuterol (PROVENTIL/VENTOLIN HFA) 90 mcg/actuation inhaler, Inhale 2 puffs into the lungs every 6 (six) hours as needed. Rescue, Disp: 20.1 g, Rfl: 11    amoxicillin-clavulanate 875-125mg (AUGMENTIN) 875-125 mg per tablet, Take 1 tablet by mouth every 12 hours, Disp: 20 tablet, Rfl: 0    azelastine (ASTELIN) 137 mcg (0.1 %) nasal spray, Use 1 spray (137 mcg total) in each nostril 2 (two) times daily. (Patient taking differently: 1 spray by Nasal route 2 (two) times daily as needed.), Disp: 30 mL, Rfl: 1    azithromycin (Z-DEVON) 250 MG tablet, Take 2 tablets by mouth on day 1, then take 1 tablet daily until all taken, Disp: 6 tablet, Rfl: 0    BD ULTRA-FINE JAIME PEN NEEDLE 32 gauge x 5/32" Ndle, For Forteo - inject daily, Disp: 100 each, Rfl: 3    budesonide-formoterol 160-4.5 mcg (SYMBICORT) 160-4.5 mcg/actuation HFAA, Inhale 2 puffs into the lungs every 12 (twelve) hours., Disp: 30.6 g, Rfl: 3    calcium citrate-vitamin D3 315-200 mg (CITRACAL+D) 315 mg-5 mcg (200 unit) per tablet, Take 1 tablet by mouth 2 (two) times daily. , Disp: , Rfl:     chlorhexidine (PERIDEX) 0.12 % solution, Rinse with 2 teaspoons (10ml) by mouth three times daily, Disp: 473 mL, Rfl: 0    cycloSPORINE (RESTASIS) 0.05 % ophthalmic emulsion, PLACE 1 DROP INTO BOTH EYES TWICE A DAY, Disp: 180 each, Rfl: 3    " diclofenac sodium (VOLTAREN) 1 % Gel, APPLY 2 GRAMS TOPICALLY 4 (FOUR) TIMES DAILY AS NEEDED., Disp: 300 g, Rfl: 2    fluconazole (DIFLUCAN) 100 MG tablet, Take 1 tablet (100 mg total) by mouth once daily., Disp: 14 tablet, Rfl: 3    fluconazole (DIFLUCAN) 150 MG Tab, Take 150 mg by mouth as needed., Disp: , Rfl:     fluconazole (DIFLUCAN) 150 MG Tab, Take  1 tablet by mouth now, Disp: 1 tablet, Rfl: 1    fluticasone propionate (FLONASE) 50 mcg/actuation nasal spray, Use 1 spray (50 mcg total) in each nostril once daily., Disp: 16 g, Rfl: 1    ibuprofen (ADVIL,MOTRIN) 600 MG tablet, Take one tablet by mouth every 6 hours as needed for pain, Disp: 20 tablet, Rfl: 0    ibuprofen (ADVIL,MOTRIN) 800 MG tablet, Take 1 tablet by mouth every 6 to 8 hours as needed for pain, Disp: 30 tablet, Rfl: 0    INV PROPULSID 10 MG, Take 2 tablets (20 mg) by mouth 4 (four) times daily. FOR INVESTIGATIONAL USE ONLY. Protocol [id number]-154 Subject ID: [id number]., Disp: 1440 each, Rfl: 0    lifitegrast (XIIDRA) 5 % Dpet, INSTILL ONE DROP INTO BOTH EYES TWICE A DAY, Disp: 60 mL, Rfl: 10    magic mouthwash diphen/antac/lidoc, rinse mouth with 5 ml for 30 seconds and spit out, twice daily, Disp: 150 mL, Rfl: 2    methenamine (HIPREX) 1 gram Tab, Take 1 tablet (1 g total) by mouth 2 (two) times daily., Disp: 180 tablet, Rfl: 3    methocarbamoL (ROBAXIN) 750 MG Tab, Take 1 tablet (750 mg total) by mouth 3 (three) times daily as needed., Disp: 90 tablet, Rfl: 1    methocarbamoL (ROBAXIN) 750 MG Tab, Take 1 tablet (750 mg total) by mouth 4 (four) times daily., Disp: 120 tablet, Rfl: 0    mirabegron (MYRBETRIQ) 25 mg Tb24 ER tablet, Take 1 tablet (25 mg total) by mouth once daily., Disp: 90 tablet, Rfl: 3    montelukast (SINGULAIR) 10 mg tablet, Take 1 tablet (10 mg total) by mouth every evening., Disp: 90 tablet, Rfl: 3    naproxen (NAPROSYN) 500 MG tablet, TAKE 1 TABLET BY MOUTH TWICE A DAY AS NEEDED, Disp: 180 tablet, Rfl: 0    omeprazole  (PRILOSEC) 40 MG capsule, Take 1 capsule (40 mg total) by mouth every morning., Disp: 30 capsule, Rfl: 6    omeprazole-sodium bicarbonate (ZEGERID) 40-1.1 mg-gram per capsule, TAKE 1 CAPSULE BY MOUTH EVERY DAY IN THE MORNING, Disp: 90 capsule, Rfl: 3    oxyCODONE-acetaminophen (PERCOCET) 5-325 mg per tablet, Take 1 tablet by mouth every 6 (six) hours as needed for Pain., Disp: 120 tablet, Rfl: 0    POTASSIUM ORAL, Take by mouth once daily., Disp: , Rfl:     predniSONE (DELTASONE) 5 MG tablet, TAKE 1 TABLET BY MOUTH EVERY DAY (Patient taking differently: Take 6 mg by mouth once daily.), Disp: 90 tablet, Rfl: 1    pregabalin (LYRICA) 150 MG capsule, Take 1 capsule (150 mg total) by mouth 3 (three) times daily., Disp: 90 capsule, Rfl: 2    promethazine (PHENERGAN) 25 MG tablet, Take 1 tablet (25 mg total) by mouth every 6 (six) hours as needed for Nausea., Disp: 15 tablet, Rfl: 0    promethazine (PHENERGAN) 25 MG tablet, Take one tablet by mouth every 8 hours as needed for nausea/vomiting, Disp: 10 tablet, Rfl: 0    rosuvastatin (CRESTOR) 20 MG tablet, TAKE 1 TABLET BY MOUTH EVERY DAY IN THE EVENING, Disp: 90 tablet, Rfl: 0    rosuvastatin (CRESTOR) 20 MG tablet, Take 1 tablet by mouth every day in the evening, Disp: 90 tablet, Rfl: 3    sarilumab (KEVZARA) 200 mg/1.14 mL Syrg, Inject 1.14 mL (200 mg total) into the skin every 14 (fourteen) days., Disp: 2.28 mL, Rfl: 1    sucralfate (CARAFATE) 1 gram tablet, TAKE 1 TABLET BY MOUTH 4 TIMES DAILY., Disp: 360 tablet, Rfl: 2    teriparatide (FORTEO) 20 mcg/dose (600mcg/2.4mL) PnIj, Inject 0.08 mLs (20 mcg total) into the skin once daily., Disp: 2.4 mL, Rfl: 11    albuterol-ipratropium (DUO-NEB) 2.5 mg-0.5 mg/3 mL nebulizer solution, Take 3 mLs by nebulization every 6 (six) hours as needed for Wheezing. Rescue, Disp: 90 mL, Rfl: 3    leflunomide (ARAVA) 20 MG Tab, Take 1 tablet (20 mg total) by mouth once daily., Disp: 90 tablet, Rfl: 0    Objective:     Physical Exam    Constitutional: She is oriented to person, place, and time. normal appearance. She does not appear ill.   HENT:   Head: Normocephalic and atraumatic.   Mouth/Throat: Mucous membranes are moist.   Mouth/Throat: Darkening around area on inner jaw just inferior to her second to last molar.  Cardiovascular: Normal rate.   Pulmonary/Chest: Effort normal.   Musculoskeletal:         General: No swelling or tenderness.      Right elbow: Normal.      Right knee: Normal.      Left knee: Normal.      Comments: Bilateral non-tender R>L ulnar styloid enlargement due to chronic subluxation.   Neurological: She is alert and oriented to person, place, and time. She displays no weakness. Gait abnormal.   Patient walks with excess spinal kyphosis due to fear of low back pain.    Skin: No rash noted.   Psychiatric: Her behavior is normal. Mood normal.       Right Side Rheumatological Exam     Examination finds the elbow, knee, 1st PIP, 1st MCP, 2nd PIP, 2nd MCP, 3rd PIP, 3rd MCP, 4th PIP, 4th MCP, 5th PIP and 5th MCP normal.    The patient has an enlarged wrist    Muscle Strength (0-5 scale):  Deltoid:  5  Biceps: 5/5   Triceps:  5  : 5/5   Iliopsoas: 5  Quadriceps:  5   Distal Lower Extremity: 5    Left Side Rheumatological Exam     Examination finds the knee, 1st PIP, 1st MCP, 2nd PIP, 2nd MCP, 3rd PIP, 3rd MCP, 4th PIP, 4th MCP, 5th PIP and 5th MCP normal.    The patient has an enlarged wrist.    Muscle Strength (0-5 scale):  Deltoid:  5  Biceps: 5/5   Triceps:  5  :  5/5   Iliopsoas: 5  Quadriceps:  5   Distal Lower Extremity: 5              Latest Reference Range & Units 04/05/24 10:15 04/08/24 08:14   WBC 3.90 - 12.70 K/uL 4.57    RBC 4.00 - 5.40 M/uL 4.08    Hemoglobin 12.0 - 16.0 g/dL 12.4    Hematocrit 37.0 - 48.5 % 38.8    MCV 82 - 98 fL 95    MCH 27.0 - 31.0 pg 30.4    MCHC 32.0 - 36.0 g/dL 32.0    RDW 11.5 - 14.5 % 13.6    Platelet Count 150 - 450 K/uL 230    MPV 9.2 - 12.9 fL 10.8    Sed Rate 0 - 20 mm/Hr 2     AST 10 - 40 U/L 27    ALT 10 - 44 U/L 29    CRP 0.0 - 8.2 mg/L <0.3    Cholesterol Total 120 - 199 mg/dL  178   HDL 40 - 75 mg/dL  77 (H)   HDL/Cholesterol Ratio 20.0 - 50.0 %  43.3   Non-HDL Cholesterol mg/dL  101   Total Cholesterol/HDL Ratio 2.0 - 5.0   2.3   Triglycerides 30 - 150 mg/dL  109   LDL Cholesterol 63.0 - 159.0 mg/dL  79.2   (H): Data is abnormally high      6/23/2022 10/6/2022 9/6/2023 4/10/2024   Tender (GURROLA-28) 1 / 28 1 / 28 2 / 28 0 / 28    Swollen (GURROLA-28) 0 / 28 0 / 28 1 / 28 0 / 28    Provider Global 10 mm 10 mm 10 mm 5 mm   Patient Global 90 mm 75 mm 50 mm --   ESR 1 mm/hr 2 mm/hr 8 mm/hr 2 mm/hr   CRP 0.3 mg/L 0.3 mg/L 0.3 mg/L --   GURROLA-28 (ESR) 1.82 (Remission) 2.1 (Remission) 3.23 (Moderate disease activity) --   GURROLA-28 (CRP) 2.87 (Low disease activity) 2.66 (Low disease activity) 2.83 (Low disease activity) --   CDAI Score 11  9.5  9  --              Assessment / Plan:     Rheumatoid arthritis  - continue sarilumab 200 mg sc every 2 weeks, stop when taking abx and 2 weeks before and after surgery  - continue leflunomide 20 mg daily  - Continue Prednisone 5 mg daily, do not increase near surgery  Osteoporosis  - managed by Endocrinology  - Sarted Forteo 4/8/24  - Stopped denosumab 2/2 osteonecrosis of the jaw   - 1200 mg dietary calcium daily  - Last DXA 12/2023  Osteonecrosis/Osteomyelitis of the jaw  - Completed Chlorhexidine mouth wash with Oral Surgery  - Will treat with IV daptomycin and ertapenem for 6 weeks per PICC, following with ID Dr. Bernstein  Fibromyalgia  - continue kelly chi  - continue aerobic exercise   - continue yoga  Osteoarthritis  - Patient currently complaining of right shoulder pain, right hip pain, and right knee effusion / pain.   - F/u Dr. Yarbrough for right shoulder pain  - f/u Dr. Díaz for right hip pain, effusion, refractory to injection x 2.  And OA right knee  - continue aerobic exercise on stationary bike and kelly chi    Health Maintenance  -  Tdap due, okay to take now  - Start Mediterranean diet     RTC 3 months with standing labs, vitamin D      Luis Alfredo Lowery M.D.  PGY-1  [employer] [facility]

## 2024-04-11 ENCOUNTER — PATIENT MESSAGE (OUTPATIENT)
Dept: ENDOCRINOLOGY | Facility: CLINIC | Age: 64
End: 2024-04-11
Payer: MEDICARE

## 2024-04-15 ENCOUNTER — OFFICE VISIT (OUTPATIENT)
Dept: ENDOCRINOLOGY | Facility: CLINIC | Age: 64
End: 2024-04-15
Payer: MEDICARE

## 2024-04-15 DIAGNOSIS — M81.8 STEROID-INDUCED OSTEOPOROSIS: Primary | Chronic | ICD-10-CM

## 2024-04-15 DIAGNOSIS — M06.9 RHEUMATOID ARTHRITIS INVOLVING MULTIPLE SITES, UNSPECIFIED WHETHER RHEUMATOID FACTOR PRESENT: ICD-10-CM

## 2024-04-15 DIAGNOSIS — E05.90 SUBCLINICAL HYPERTHYROIDISM: ICD-10-CM

## 2024-04-15 DIAGNOSIS — E04.1 THYROID NODULE: ICD-10-CM

## 2024-04-15 DIAGNOSIS — R11.0 NAUSEA: ICD-10-CM

## 2024-04-15 DIAGNOSIS — M87.180 OSTEONECROSIS OF JAW DUE TO DRUG: ICD-10-CM

## 2024-04-15 DIAGNOSIS — T38.0X5A STEROID-INDUCED OSTEOPOROSIS: Primary | Chronic | ICD-10-CM

## 2024-04-15 DIAGNOSIS — M05.79 RHEUMATOID ARTHRITIS INVOLVING MULTIPLE SITES WITH POSITIVE RHEUMATOID FACTOR: ICD-10-CM

## 2024-04-15 PROCEDURE — 99214 OFFICE O/P EST MOD 30 MIN: CPT | Mod: 95,,, | Performed by: INTERNAL MEDICINE

## 2024-04-15 PROCEDURE — G2211 COMPLEX E/M VISIT ADD ON: HCPCS | Mod: 95,,, | Performed by: INTERNAL MEDICINE

## 2024-04-15 RX ORDER — PROMETHAZINE HYDROCHLORIDE 25 MG/1
25 TABLET ORAL EVERY 6 HOURS PRN
Qty: 90 TABLET | Refills: 5 | Status: SHIPPED | OUTPATIENT
Start: 2024-04-15

## 2024-04-15 NOTE — PROGRESS NOTES
FOLLOW-UP VISIT    Subjective:      Chief Complaint: Follow-up for osteoporosis    HPI: Joyce Peterson is a 63 y.o. female who is here for a follow-up evaluation for osteoporosis    The patient's last visit with me was on 1/11/2024.      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 She had a bone biopsy done in mid March, which came back with 2 drug resistance organisms. She saw infectious diseases and was started on ertapenem + daptomycin, which she initiated about 1 week ago. The plan is for 6 weeks of treatment. The pain has significantly improved since starting on the antibiotics.    Still having a lot of back pain, may need back surgery. She got another opinion from Dr. Schuster at  and is planning on seeing him back in the next few weeks to discuss potential surgery. She is also seeing Dr. Mayfield regularly for pain management.    She has been having nausea with both the antibiotics and Forteo.  She is allergic to Zofran and requests a prescription for Phenergan.    OP medication history:   Prolia 8/2017 - 12/2023  Forteo from Jan, 2020 - Jan, 2022; tolerated well except for nausea sometimes.  Forteo resumed 4/2024- current - started after developing MRONJ      Calcium intake?  Citracal BID  Vit D intake?  Citracal BID  Weight bearing exercise?   no  Recent falls? no  Fall Precautions? no  Height loss (>2 inches)? no    Fracture history:  Has had at least 7 fractures in her feet (2nd-5th metatarsals). Two since being on Prolia.  Stress fracture of left femoral neck in 5/2019 - s/p CM nailing  April, 2021: Nondisplaced chip or avulsion fracture of the left greater tuberosity of the humerus; sitting on a chair in a boat, the chair broke and she fell backward on her shoulder.    Tobacco use?  no  EtOH use?  Yes; occasional     Current diarrhea or h/o  malabsorption? no  Chronic steroids? Yes - has been on chronic corticosteroids for the past 34 years for RA. On and off, but mostly on; current dose is 5 mg daily, but previously on higher doses  Anticoagulants? no   Proton Pump Inhibitors? yes  Antiseizure medications? no   Thyroid disease? Subclinical hyperthyroidism vs. TSH suppression from chronic steroids, but most recent labs are normal  Anemia? no  Kidney Stones? no  Hyperparathyroidism? no    Post-menopausal? Age?: 50s  ERT after menopause?: Yes, remains on E+P    Mom or dad with hip fracture?: No, mom with hip fx     Malignancy involving bone (active or history)? no  Prior radiation treatment? no  Unexplained elevation of alkaline phosphatase? No      DXA (Year)  Location 3/17/2021  ([facility]) 3/8/2022  ([facility]) 12/20/2023  ([facility]) Percent change from previous   L-spine T-score -1.3 -0.5 +0.5 (L1/L2 only)    Total Hip T-score -0.3 -0.3 -0.3 ([facility] since 3/2022)   Femoral Neck T-score -0.9 -0.7 -0.8    FRAX (MOF  /  Hip) 24%  /  1.7% 23%  /  1.6% 25%  /  1.8%          With regards to the thyroid nodule and subclinical hyperthyroidism:    Presents with nodule that was diagnosed by ultrasound    Preexisting thyroid disease?: Intermittent mild TSH suppression in the past, but normal more recently. subclinical hyperthyroidism (TSH normal recently)    Lab Results   Component Value Date    TSH 0.619 10/18/2022    TSH 1.200 11/17/2021    TSH 2.330 07/26/2021    FREET4 0.84 10/18/2022    FREET4 0.88 02/19/2020    FREET4 0.97 08/21/2019    T3FREE 2.6 08/11/2017    T3FREE 2.7 01/07/2008    Y7JKTYF 82 08/21/2019    THYROTROPINR <1.10 11/11/2020    THYROPEROXID <6.0 02/19/2020           Compressive symptoms: No    Risk Factors:  Radiation to head or neck for any treatment of cancer or exposure to radiation?: no  Personal history of colon or breast cancer?: no  Tobacco use?: no  Family history of thyroid cancer?: no    Symptoms of hyper or  "hypothyroidism:  No    Previous biopsy results:  Right nodule: Unsat 2012, then Benign - 2012    Last ultrasound: 5/17/2021  No significant change in any of the nodules.  Planning follow-up ultrasound in May, 2023.      Reviewed past medical, family, social history and updated as appropriate.      Objective:     There were no vitals filed for this visit.    BP Readings from Last 5 Encounters:   04/10/24 123/76   03/11/24 134/78   02/15/24 115/77   01/22/24 122/74   01/11/24 118/78       Physical Exam    Wt Readings from Last 10 Encounters:   04/10/24 0804 64.5 kg (142 lb 3.2 oz)   03/11/24 1126 61.2 kg (135 lb)   03/06/24 1337 63.4 kg (139 lb 12.4 oz)   01/22/24 1345 63.4 kg (139 lb 12.4 oz)   01/11/24 1317 64.9 kg (143 lb 1.3 oz)   01/10/24 1232 61.7 kg (136 lb)   12/26/23 1036 61.7 kg (136 lb 0.4 oz)   12/20/23 1001 61.7 kg (136 lb)   12/15/23 1344 63.1 kg (139 lb 1.8 oz)   12/13/23 0917 64.1 kg (141 lb 4.8 oz)       Lab Results   Component Value Date    HGBA1C 5.2 03/15/2022     Lab Results   Component Value Date    CHOL 178 04/08/2024    HDL 77 (H) 04/08/2024    LDLCALC 79.2 04/08/2024    TRIG 109 04/08/2024    CHOLHDL 43.3 04/08/2024     Lab Results   Component Value Date     04/05/2024    K 4.1 04/05/2024     04/05/2024    CO2 25 04/05/2024    GLU 97 04/05/2024    BUN 10 04/05/2024    CREATININE 0.7 04/05/2024    CALCIUM 8.9 04/05/2024    PROT 6.0 04/05/2024    ALBUMIN 3.8 04/05/2024    BILITOT 0.4 04/05/2024    ALKPHOS 89 04/05/2024    AST 27 04/05/2024    ALT 29 04/05/2024    ANIONGAP 8 04/05/2024    ESTGFRAFRICA >60.0 06/20/2022    EGFRNONAA >60.0 06/20/2022    TSH 0.619 10/18/2022      No results found for: "MICALBCREAT"    Assessment/Plan:     Steroid-induced osteoporosis  Patient has glucocorticoid induced osteoporosis; Fracture risk remains very high. The recent issues with ENRIQUEJ further complicates her treatment since we will want to minimize any additional exposure to anti-resorptive " agents.     She previously tolerated 2 full years of Forteo. Given our limited options and the presence of active ONJ, I believe resuming Forteo is the most reasonable option.  We discussed side effects including nausea and lightheadedness.  Discussed the increased incidence of bone tumors in rats studies, which fortunately has not been seen in humans.  My plan is to treat for at least the next two years.  We will then assess bone density and bone turnover markers regularly to determine if she will need additional treatment after that.    Continue Vit D + Calcium at current doses.  She inquired about vitamin K2.  It would be okay to start that although there is a paucity of data regarding factor risk prevention with this supplement.  She also inquired about BPC-157. I was not able to find any studies showing clinical benefit in humans.  Therefore I recommended avoiding that one until we have further information.    Check DXA in 12/2024 - need to check more frequently then every 2 years due to high fracture risk and to evaluate efficacy of therapy.    Rheumatoid arthritis involving multiple sites  Increases risk for fracture  On chronic corticosteroids, which is her main risk factor for osteoporosis and fractures.  Following with Dr. Moran.    Subclinical hyperthyroidism  Check TSH with next labs. Mild.    Thyroid nodule  Recent thyroid ultrasound without detrimental change.  We do not need to closely follow these nodules.  We will perhaps get a thyroid ultrasound every three years.    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 See above regarding the plan for osteoporosis going forward.    Nausea  Prescription sent for Zofran.  She says a half a tablet has worked well for her in the past.      The patient location is: home  The chief complaint leading to consultation is: osteoporosis, thyroid    Visit type: audiovisual    Face to Face time  with patient: 24 minutes of total time spent on the encounter, which includes face to face time and non-face to face time preparing to see the patient (eg, review of tests), Obtaining and/or reviewing separately obtained history, Documenting clinical information in the electronic or other health record, Independently interpreting results (not separately reported) and communicating results to the patient/family/caregiver, or Care coordination (not separately reported).     Each patient to whom he or she provides medical services by telemedicine is:  (1) informed of the relationship between the physician and patient and the respective role of any other health care provider with respect to management of the patient; and (2) notified that he or she may decline to receive medical services by telemedicine and may withdraw from such care at any time.    Notes:     Follow up in about 6 months (around 10/15/2024).

## 2024-04-15 NOTE — ASSESSMENT & PLAN NOTE
Increases risk for fracture  On chronic corticosteroids, which is her main risk factor for osteoporosis and fractures.  Following with Dr. Moran.

## 2024-04-15 NOTE — ASSESSMENT & PLAN NOTE
Recent thyroid ultrasound without detrimental change.  We do not need to closely follow these nodules.  We will perhaps get a thyroid ultrasound every three years.

## 2024-04-17 ENCOUNTER — OFFICE VISIT (OUTPATIENT)
Dept: PAIN MEDICINE | Facility: CLINIC | Age: 64
End: 2024-04-17
Attending: ANESTHESIOLOGY
Payer: MEDICARE

## 2024-04-17 VITALS
HEIGHT: 61 IN | WEIGHT: 142.19 LBS | SYSTOLIC BLOOD PRESSURE: 104 MMHG | HEART RATE: 87 BPM | DIASTOLIC BLOOD PRESSURE: 70 MMHG | OXYGEN SATURATION: 100 % | BODY MASS INDEX: 26.85 KG/M2 | RESPIRATION RATE: 18 BRPM

## 2024-04-17 DIAGNOSIS — M96.1 POST LAMINECTOMY SYNDROME: ICD-10-CM

## 2024-04-17 DIAGNOSIS — M87.9 OSTEONECROSIS OF MANDIBLE: ICD-10-CM

## 2024-04-17 DIAGNOSIS — M48.062 SPINAL STENOSIS OF LUMBAR REGION WITH NEUROGENIC CLAUDICATION: Primary | ICD-10-CM

## 2024-04-17 PROCEDURE — 3008F BODY MASS INDEX DOCD: CPT | Mod: CPTII,S$GLB,, | Performed by: ANESTHESIOLOGY

## 2024-04-17 PROCEDURE — 1159F MED LIST DOCD IN RCRD: CPT | Mod: CPTII,S$GLB,, | Performed by: ANESTHESIOLOGY

## 2024-04-17 PROCEDURE — 1160F RVW MEDS BY RX/DR IN RCRD: CPT | Mod: CPTII,S$GLB,, | Performed by: ANESTHESIOLOGY

## 2024-04-17 PROCEDURE — 99999 PR PBB SHADOW E&M-EST. PATIENT-LVL V: CPT | Mod: PBBFAC,,, | Performed by: ANESTHESIOLOGY

## 2024-04-17 PROCEDURE — 99214 OFFICE O/P EST MOD 30 MIN: CPT | Mod: GC,S$GLB,, | Performed by: ANESTHESIOLOGY

## 2024-04-17 PROCEDURE — 3078F DIAST BP <80 MM HG: CPT | Mod: CPTII,S$GLB,, | Performed by: ANESTHESIOLOGY

## 2024-04-17 PROCEDURE — 3074F SYST BP LT 130 MM HG: CPT | Mod: CPTII,S$GLB,, | Performed by: ANESTHESIOLOGY

## 2024-04-17 RX ORDER — HYDROMORPHONE HYDROCHLORIDE 4 MG/1
2 TABLET ORAL EVERY 6 HOURS PRN
Qty: 60 TABLET | Refills: 0 | Status: SHIPPED | OUTPATIENT
Start: 2024-04-17 | End: 2024-05-17 | Stop reason: SDUPTHER

## 2024-04-17 RX ORDER — NALOXONE HYDROCHLORIDE 4 MG/.1ML
SPRAY NASAL
Qty: 2 EACH | Refills: 11 | Status: SHIPPED | OUTPATIENT
Start: 2024-04-17

## 2024-04-17 RX ORDER — ERTAPENEM 1 G/1
INJECTION, POWDER, LYOPHILIZED, FOR SOLUTION INTRAMUSCULAR; INTRAVENOUS
COMMUNITY
Start: 2024-04-17

## 2024-04-17 RX ORDER — DAPTOMYCIN 50 MG/ML
INJECTION, POWDER, LYOPHILIZED, FOR SOLUTION INTRAVENOUS
COMMUNITY
Start: 2024-04-17

## 2024-04-17 NOTE — PROGRESS NOTES
Pain Follow up Note- Established Patient         Subjective:      Patient ID: Joyce Peterson is a 63 y.o. female.    Chief Complaint: Back Pain (Follow up )    Referred by: No ref. provider found       Back Pain      Original HPI  She is here for follow-up for left hip pain.  She said the radiofrequency ablation she had in July of 2022 helped her tremendously.  Three months ago her pain started again on the lateral aspect of the left hip.  She is status post ORIF on the left hip.  She has heterotopic ossification.  She would like to repeat the procedure.  She said she will have to put off the spinal cord stimulator until an MRI is executed ordered by another provider.  No new symptomatology otherwise.      Interval History 4/17/2024:   Patient returns to clinic for follow up of low back and hip pain. She was scheduled for SCS trial however trial was not completed due to patient preference. She states she has been seen by neurosugery and orthopedic surgery, for which she plans to undergo back surgery. 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  She reports pain has been worsening  since last visit. She states she continues to take lyrica TID, robaxin 750mg QID, oxycodone 5 q.i.d. p.r.n.  for pain control, she states this has not provided her with relief x the last 3 weeks. She reports she has not been able to participate in HEP due to pain. Pain level at time of exam is reported to be 8.5/10. Patient requests she wants to return to a previous regimen of percocet and dilaudid until she is able to have back procedure. She expresses excruciating pain that runs down bilateral pain with any movement and with coughing.     Interval history  She returns for virtual follow up of back and hip pain. At her last OV, Dr. Mayfield recommended [place] SCS trial. She had her first part of the psych consult and has the second part  this afternoon. She has had limited benefit with multiple interventions in the past including back surgery, procedures, PT and medications. She would like to move forward with SCS trial. Her pain today is 10/10.    Interval History 1/16/24:  Joyce Peterson returns for virtual follow up of low back and left hip pain. She is s/p L4/5 TLIF 6/22/23. She is planning for a left hip RFA and SCS trial with us. 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 She has since had an MRI of her L Spine in December that demonstrated a large amount of extruded disc material extending superiorly from L4-5 into the spinal canal with additional grade 2 anterolisthesis at this level contributing to severe spinal canal stenosis. She reports intermittent weakness in her left > right leg. Patient denies saddle anesthesia, bladder/bowel incontinence, ataxia, or increased falls. Shopping cart sign +. She is able to walk 1 block before needing to stop. Pain improves upon sitting. Pain currently ranges from 5-9/10, currently a 7/10. Pain radiates down her both her legs anterolaterally down to the ankle with additional shin pain. She has been on percocet 5-325 QID PRN and lyrica 150mg TID. She received opioids from Ortho 5/325 in October addition to our Rx. Ortho saw patient today and is recommending an L2-Pelvis        Past Medical History:   Diagnosis Date    Acid reflux     Allergy     Anemia     Asthma     Coronary artery disease     CS (cervical spondylosis) 03/08/2013    Degenerative disc disease     Degenerative joint disease of hindfoot, left 6/28/2022    Dry eyes     Dry mouth     Gastroparesis     History of methotrexate therapy 01/19/2022    Hyperlipidemia     Lateral meniscus derangement 04/06/2016    Lobular carcinoma in situ     Lumbar spondylosis 03/08/2013    Osteoarthritis     Osteoporosis     Pes planovalgus, acquired, left 6/28/2022    Rheumatoid arthritis(714.0)     Rupture of left triceps tendon  10/17/2018    Umbilical hernia 08/13/2015       Past Surgical History:   Procedure Laterality Date    BREAST BIOPSY Left 01/29/2002    core bx    CARPAL TUNNEL RELEASE Right 05/2017    CHOLECYSTECTOMY  2004    COLONOSCOPY      10/11    COLONOSCOPY N/A 6/29/2017    Procedure: COLONOSCOPY;  Surgeon: López Moore MD;  Location: [facility] ENDO (4TH FLR);  Service: Endoscopy;  Laterality: N/A;    EPIDURAL STEROID INJECTION N/A 11/18/2021    Procedure: INJECTION, STEROID, EPIDURAL, L5-S1 IL NEED CONSENT;  Surgeon: Tj Mayfield MD;  Location: [facility] PAIN MGT;  Service: Pain Management;  Laterality: N/A;    EPIDURAL STEROID INJECTION N/A 9/29/2022    Procedure: LUMBAR L3/L4 IL MADELINE CONTRAST;  Surgeon: Tj Mayfield MD;  Location: [facility] PAIN MGT;  Service: Pain Management;  Laterality: N/A;    EPIDURAL STEROID INJECTION N/A 12/12/2022    Procedure: INJECTION, STEROID, EPIDURAL L5/S1 IL CONTRAST;  Surgeon: Tj Mayfield MD;  Location: [facility] PAIN MGT;  Service: Pain Management;  Laterality: N/A;    EPIDURAL STEROID INJECTION N/A 4/6/2023    Procedure: INJECTION, STEROID, EPIDURAL L5/S1;  Surgeon: Tj Mayfield MD;  Location: [facility] PAIN MGT;  Service: Pain Management;  Laterality: N/A;    FOOT ARTHRODESIS Left 1/11/2023    Procedure: FUSION, FOOT;  Surgeon: Ariella Finnegan MD;  Location: [facility] OR;  Service: Orthopedics;  Laterality: Left;  [facility]    FUSION, SPINE, LUMBAR, TLIF, POSTERIOR APPROACH, USING PEDICLE SCREW N/A 6/22/2023    Procedure: FUSION, SPINE, LUMBAR, TLIF, POSTERIOR APPROACH, USING PEDICLE SCREW L4/5 spinewave SNS:SSEP/EMG;  Surgeon: Jh Mosqueda MD;  Location: [facility] OR 2ND FLR;  Service: Neurosurgery;  Laterality: N/A;    HARVESTING OF BONE GRAFT Left 1/11/2023    Procedure: SURGICAL PROCUREMENT, BONE GRAFT;  Surgeon: Ariella Finnegan MD;  Location: [facility] OR;  Service: Orthopedics;  Laterality: Left;    INJECTION OF ANESTHETIC AGENT AROUND NERVE Bilateral 8/23/2021    Procedure: BLOCK, NERVE, MEDIAL BRANCH L3,L4,L5;   Surgeon: Tj Mayfield MD;  Location: [facility] PAIN MGT;  Service: Pain Management;  Laterality: Bilateral;  1 of 2    INJECTION OF ANESTHETIC AGENT AROUND NERVE Bilateral 8/26/2021    Procedure: BLOCK, NERVE, MEDIAL BRANCH L3,L4,L5;  Surgeon: Tj Mayfield MD;  Location: [facility] PAIN MGT;  Service: Pain Management;  Laterality: Bilateral;  2 of 2    INJECTION OF ANESTHETIC AGENT AROUND NERVE Left 7/7/2022    Procedure: BLOCK, NERVE, LEFT OBTURATOR AND FEMORAL;  Surgeon: Tj Mayfield MD;  Location: [facility] PAIN MGT;  Service: Pain Management;  Laterality: Left;    INJECTION OF FACET JOINT Bilateral 3/12/2020    Procedure: FACET JOINT INJECTION (LUMBAR BLOCK) BILATERAL L4-5 AND L5-S1 DIRECT REFERRAL;  Surgeon: Tj Mayfield MD;  Location: [facility] PAIN MGT;  Service: Pain Management;  Laterality: Bilateral;  NEEDS CONSENT    INJECTION OF FACET JOINT Bilateral 3/29/2021    Procedure: INJECTION, FACET JOINT L3/4, L4/5, L5/S1;  Surgeon: Tj Mayfield MD;  Location: [facility] PAIN MGT;  Service: Pain Management;  Laterality: Bilateral;    INJECTION OF JOINT Right 7/30/2020    Procedure: INJECTION, JOINT, RIGHT HIP Interarticular under flouro;  Surgeon: Tj Mayfield MD;  Location: [facility] PAIN MGT;  Service: Pain Management;  Laterality: Right;  INJECTION, JOINT, RIGHT HIP Interarticular under flouro    INJECTION OF JOINT Right 2/21/2022    Procedure: Injection, Joint RIGHT ISCHIAL BURSA;  Surgeon: Tj Mayfield MD;  Location: [facility] PAIN MGT;  Service: Pain Management;  Laterality: Right;    INTRAMEDULLARY RODDING OF FEMUR Left 5/21/2019    Procedure: INSERTION, INTRAMEDULLARY ARIEL, FEMUR;  Surgeon: López Duff MD;  [facility];  Service: Orthopedics;  Laterality: Left;    LENGTHENING OF TENDON OF LOWER EXTREMITY Left 1/11/2023    Procedure: LENGTHENING, TENDON, LOWER EXTREMITY;  Surgeon: Ariella Finnegan MD;  Location: [facility] OR;  Service: Orthopedics;  Laterality: Left;    MAGNETIC RESONANCE IMAGING N/A 5/29/2023    Procedure: MRI  (Magnetic Resonance Imagine);  Surgeon: Sujey Surgeon;  Location: [facility] SU[facility]Y;  Service: Anesthesiology;  Laterality: N/A;    MAGNETIC RESONANCE IMAGING N/A 1/10/2024    Procedure: MRI (Magnetic Resonance Imagine);  Surgeon: Sujey Robin;  Location: [facility];  Service: Anesthesiology;  Laterality: N/A;    RADIOFREQUENCY ABLATION Left 9/20/2021    Procedure: RADIOFREQUENCY ABLATION left L3,4,5 RFA  1 of 2  CONSENT NEEDED;  Surgeon: Tj Mayfield MD;  Location: [facility] PAIN MGT;  Service: Pain Management;  Laterality: Left;    RADIOFREQUENCY ABLATION Right 10/4/2021    Procedure: RADIOFREQUENCY ABLATION  right L3,4,5 RFA   2 of 2  CONSENT NEEDED;  Surgeon: Tj Mayifeld MD;  Location: [facility] PAIN MGT;  Service: Pain Management;  Laterality: Right;    RADIOFREQUENCY ABLATION Left 4/21/2022    Procedure: Radiofrequency Ablation LEFT L3,L4,L5;  Surgeon: Tj Mayfield MD;  Location: [facility] PAIN MGT;  Service: Pain Management;  Laterality: Left;    RADIOFREQUENCY ABLATION Right 5/12/2022    Procedure: Radiofrequency Ablation RIGHT L3,L4,L5;  Surgeon: Tj Mayfield MD;  Location: [facility] PAIN MGT;  Service: Pain Management;  Laterality: Right;    RADIOFREQUENCY ABLATION Left 7/25/2022    Procedure: Radiofrequency Ablation Left Femoral & Obturator;  Surgeon: Tj Mayfield MD;  Location: [facility] PAIN MGT;  Service: Pain Management;  Laterality: Left;    REPAIR OF TRICEPS TENDON Left 10/17/2018    Procedure: REPAIR, TENDON, TRICEPS left elbow;  Surgeon: Staci Yarbrough MD;  Location: [facility] OR [address] [place] [address];  Service: Orthopedics;  Laterality: Left;  Anesthesia: General and regional. PRONE, k-wire , hand pan 1 and pan 2, CALL ARTHREX/Tamera notified 10-12 LO    TONSILLECTOMY      TRANSFORAMINAL EPIDURAL INJECTION OF STEROID Right 8/31/2020    Procedure: INJECTION, STEROID, EPIDURAL, TRANSFORAMINAL,  APPROACH, L3-L4 and L4-L5 need consent;  Surgeon: Tj Mayfield MD;  Location: [facility] PAIN MGT;  Service: Pain Management;  Laterality: Right;     "TRANSFORAMINAL EPIDURAL INJECTION OF STEROID Bilateral 7/23/2021    Procedure: INJECTION, STEROID, EPIDURAL, TRANSFORAMINAL APPROACH L4/5;  Surgeon: Tj Mayfield MD;  Location: [facility] PAIN MGT;  Service: Pain Management;  Laterality: Bilateral;    TRANSFORAMINAL EPIDURAL INJECTION OF STEROID Bilateral 9/25/2023    Procedure: LUMBAR TRANSFORAMINAL BILATERAL  L2/3 DIRECT REFERRAL;  Surgeon: Tj Mayfield MD;  Location: [facility] PAIN MGT;  Service: Pain Management;  Laterality: Bilateral;    TRANSFORAMINAL EPIDURAL INJECTION OF STEROID Left 10/18/2023    Procedure: LUMBAR TRANSFORAMINAL LEFT L3/4 AND L4/5 * CLEARANCE IN CHART*;  Surgeon: Tj Mayfield MD;  Location: [facility] PAIN MGT;  Service: Pain Management;  Laterality: Left;    TRIGGER POINT INJECTION N/A 7/23/2021    Procedure: INJECTION, TRIGGER POINT SCAPULAR;  Surgeon: Tj Mayfield MD;  Location: [facility] PAIN MGT;  Service: Pain Management;  Laterality: N/A;    TUBAL LIGATION  2003    UPPER GASTROINTESTINAL ENDOSCOPY      10/11    uterine ablation  2003       Review of patient's allergies indicates:   Allergen Reactions    Actemra [tocilizumab] Other (See Comments)     Severe dizziness    Codeine Nausea And Vomiting and Hives    Gold au 198 Hives and Rash    Hydroxychloroquine Other (See Comments)     Can't remember the reaction      Iodinated contrast media Other (See Comments)     Other reaction(s): BURNING ALL OVER    Iodine Other (See Comments) and Hives     Other reaction(s): BURNING ALL OVER - Iodine dye - Not topical    Ondansetron Nausea And Vomiting    Sulfa (sulfonamide antibiotics) Other (See Comments)     Can't remember the reaction    Zofran [ondansetron hcl (pf)] Nausea And Vomiting     Pt reports that last time she received zofran she started vomiting again    Methotrexate analogues Nausea Only    Pneumococcal vaccine Other (See Comments)    Pneumovax 23 [pneumococcal 23-argenis ps vaccine] Other (See Comments)     "sick"    Methotrexate Nausea Only " "      Current Outpatient Medications   Medication Sig Dispense Refill    albuterol (PROVENTIL) 2.5 mg /3 mL (0.083 %) nebulizer solution Take 3 mLs (2.5 mg total) by nebulization every 6 (six) hours as needed for Wheezing. Rescue 75 mL 0    albuterol (PROVENTIL/VENTOLIN HFA) 90 mcg/actuation inhaler Inhale 2 puffs into the lungs every 6 (six) hours as needed. Rescue 20.1 g 11    azelastine (ASTELIN) 137 mcg (0.1 %) nasal spray Use 1 spray (137 mcg total) in each nostril 2 (two) times daily. (Patient taking differently: 1 spray by Nasal route 2 (two) times daily as needed.) 30 mL 1    BD ULTRA-FINE JAIME PEN NEEDLE 32 gauge x 5/32" Ndle For Forteo - inject daily 100 each 3    budesonide-formoterol 160-4.5 mcg (SYMBICORT) 160-4.5 mcg/actuation HFAA Inhale 2 puffs into the lungs every 12 (twelve) hours. 30.6 g 3    calcium citrate-vitamin D3 315-200 mg (CITRACAL+D) 315 mg-5 mcg (200 unit) per tablet Take 1 tablet by mouth 2 (two) times daily.       chlorhexidine (PERIDEX) 0.12 % solution Rinse with 2 teaspoons (10ml) by mouth three times daily 473 mL 0    cycloSPORINE (RESTASIS) 0.05 % ophthalmic emulsion PLACE 1 DROP INTO BOTH EYES TWICE A  each 3    DAPTOmycin (CUBICIN) 500 mg injection Inject into the vein.      diclofenac sodium (VOLTAREN) 1 % Gel APPLY 2 GRAMS TOPICALLY 4 (FOUR) TIMES DAILY AS NEEDED. 300 g 2    ertapenem (INVANZ) 1 gram injection       fluconazole (DIFLUCAN) 100 MG tablet Take 1 tablet (100 mg total) by mouth once daily. 14 tablet 3    fluconazole (DIFLUCAN) 150 MG Tab Take 150 mg by mouth as needed.      fluconazole (DIFLUCAN) 150 MG Tab Take  1 tablet by mouth now 1 tablet 1    fluticasone propionate (FLONASE) 50 mcg/actuation nasal spray Use 1 spray (50 mcg total) in each nostril once daily. 16 g 1    ibuprofen (ADVIL,MOTRIN) 600 MG tablet Take one tablet by mouth every 6 hours as needed for pain 20 tablet 0    ibuprofen (ADVIL,MOTRIN) 800 MG tablet Take 1 tablet by mouth every 6 to 8 " hours as needed for pain 30 tablet 0    INV PROPULSID 10 MG Take 2 tablets (20 mg) by mouth 4 (four) times daily. FOR INVESTIGATIONAL USE ONLY. Protocol [id number] Subject ID: [id number]. 1440 each 0    leflunomide (ARAVA) 20 MG Tab Take 1 tablet (20 mg total) by mouth once daily. 90 tablet 0    lifitegrast (XIIDRA) 5 % Dpet INSTILL ONE DROP INTO BOTH EYES TWICE A DAY 60 mL 10    magic mouthwash diphen/antac/lidoc rinse mouth with 5 ml for 30 seconds and spit out, twice daily 150 mL 2    methenamine (HIPREX) 1 gram Tab Take 1 tablet (1 g total) by mouth 2 (two) times daily. 180 tablet 3    methocarbamoL (ROBAXIN) 750 MG Tab Take 1 tablet (750 mg total) by mouth 3 (three) times daily as needed. 90 tablet 1    methocarbamoL (ROBAXIN) 750 MG Tab Take 1 tablet (750 mg total) by mouth 4 (four) times daily. 120 tablet 0    mirabegron (MYRBETRIQ) 25 mg Tb24 ER tablet Take 1 tablet (25 mg total) by mouth once daily. 90 tablet 3    montelukast (SINGULAIR) 10 mg tablet Take 1 tablet (10 mg total) by mouth every evening. 90 tablet 3    naproxen (NAPROSYN) 500 MG tablet TAKE 1 TABLET BY MOUTH TWICE A DAY AS NEEDED 180 tablet 0    omeprazole (PRILOSEC) 40 MG capsule Take 1 capsule (40 mg total) by mouth every morning. 30 capsule 6    omeprazole-sodium bicarbonate (ZEGERID) 40-1.1 mg-gram per capsule TAKE 1 CAPSULE BY MOUTH EVERY DAY IN THE MORNING 90 capsule 3    oxyCODONE-acetaminophen (PERCOCET) 5-325 mg per tablet Take 1 tablet by mouth every 6 (six) hours as needed for Pain. 120 tablet 0    POTASSIUM ORAL Take by mouth once daily.      predniSONE (DELTASONE) 5 MG tablet TAKE 1 TABLET BY MOUTH EVERY DAY (Patient taking differently: Take 6 mg by mouth once daily.) 90 tablet 1    pregabalin (LYRICA) 150 MG capsule Take 1 capsule (150 mg total) by mouth 3 (three) times daily. 90 capsule 2    promethazine (PHENERGAN) 25 MG tablet Take 1 tablet (25 mg total) by mouth every 6 (six) hours as needed for Nausea. 90 tablet 5     rosuvastatin (CRESTOR) 20 MG tablet TAKE 1 TABLET BY MOUTH EVERY DAY IN THE EVENING 90 tablet 0    rosuvastatin (CRESTOR) 20 MG tablet Take 1 tablet by mouth every day in the evening 90 tablet 3    sarilumab (KEVZARA) 200 mg/1.14 mL Syrg Inject 1.14 mL (200 mg total) into the skin every 14 (fourteen) days. 2.28 mL 1    sucralfate (CARAFATE) 1 gram tablet TAKE 1 TABLET BY MOUTH 4 TIMES DAILY. 360 tablet 2    teriparatide (FORTEO) 20 mcg/dose (600mcg/2.4mL) PnIj Inject 0.08 mLs (20 mcg total) into the skin once daily. 2.4 mL 11    albuterol-ipratropium (DUO-NEB) 2.5 mg-0.5 mg/3 mL nebulizer solution Take 3 mLs by nebulization every 6 (six) hours as needed for Wheezing. Rescue 90 mL 3    amoxicillin-clavulanate 875-125mg (AUGMENTIN) 875-125 mg per tablet Take 1 tablet by mouth every 12 hours (Patient not taking: Reported on 4/17/2024) 20 tablet 0    azithromycin (Z-DEVON) 250 MG tablet Take 2 tablets by mouth on day 1, then take 1 tablet daily until all taken (Patient not taking: Reported on 4/17/2024) 6 tablet 0    HYDROmorphone (DILAUDID) 4 MG tablet 1/2 tab q6 hours prn pain 60 tablet 0    naloxone (NARCAN) 4 mg/actuation Spry 4mg by nasal route as needed for opioid overdose; may repeat every 2-3 minutes in alternating nostrils until medical help arrives. Call 911 1 each 11     No current facility-administered medications for this visit.       Family History   Problem Relation Name Age of Onset    Cancer Mother 69         Lung Cancer    Emphysema Mother 69     Heart attack Mother 69     Alcohol abuse Mother 69     Cancer Father          Lung Cancer    Osteoarthritis Father      Lung cancer Father      Skin cancer Father      Alcohol abuse Father      Heart disease Brother 57     Heart attack Brother 57     Alcohol abuse Brother 57     Cirrhosis Brother 57     Osteoarthritis Paternal Aunt      Retinal detachment Maternal Aunt      No Known Problems Sister      No Known Problems Maternal Uncle      No Known Problems  Paternal Uncle      No Known Problems Maternal Grandmother      No Known Problems Maternal Grandfather      No Known Problems Paternal Grandmother      No Known Problems Paternal Grandfather      Colon cancer Neg Hx      Esophageal cancer Neg Hx      Stomach cancer Neg Hx      Celiac disease Neg Hx      Diabetes Neg Hx      Thyroid disease Neg Hx      Amblyopia Neg Hx      Blindness Neg Hx      Cataracts Neg Hx      Glaucoma Neg Hx      Hypertension Neg Hx      Macular degeneration Neg Hx      Strabismus Neg Hx      Stroke Neg Hx         Social History     Socioeconomic History    Marital status:    Tobacco Use    Smoking status: Never    Smokeless tobacco: Never   Substance and Sexual Activity    Alcohol use: Yes     Comment: 2-3 times per year.    Drug use: No    Sexual activity: Yes     Partners: Male     Birth control/protection: Surgical     Social Determinants of Health     Financial Resource Strain: Patient Declined (12/5/2023)    Overall Financial Resource Strain (CARDIA)     Difficulty of Paying Living Expenses: Patient declined   Food Insecurity: Patient Declined (12/5/2023)    Hunger Vital Sign     Worried About Running Out of Food in the Last Year: Patient declined     Ran Out of Food in the Last Year: Patient declined   Transportation Needs: Patient Declined (12/5/2023)    PRAPARE - Transportation     Lack of Transportation (Medical): Patient declined     Lack of Transportation (Non-Medical): Patient declined   Physical Activity: Unknown (12/5/2023)    Exercise Vital Sign     Days of Exercise per Week: Patient declined     Minutes of Exercise per Session: 0 min   Recent Concern: Physical Activity - Inactive (9/6/2023)    Exercise Vital Sign     Days of Exercise per Week: 0 days     Minutes of Exercise per Session: 0 min   Stress: Patient Declined (12/5/2023)    [employer] - Occupational Stress Questionnaire     Feeling of Stress : Patient declined   Social  "Connections: Unknown (12/5/2023)    Social Connection and Isolation Panel [NHANES]     Frequency of Communication with Friends and Family: Patient declined     Frequency of Social Gatherings with Friends and Family: Patient declined     Attends Religion Services: More than 4 times per year     Active Member of Clubs or Organizations: Patient declined     Attends Club or Organization Meetings: Patient declined     Marital Status: Patient declined   Housing Stability: Unknown (12/5/2023)    Housing Stability Vital Sign     Unable to Pay for Housing in the Last Year: Patient refused     Number of Places Lived in the Last Year: 1     Unstable Housing in the Last Year: No           Review of Systems   Musculoskeletal:  Positive for back pain.           Objective:   /70   Pulse 87   Resp 18   Ht 5' 1" (1.549 m)   Wt 64.5 kg (142 lb 3.2 oz)   LMP  (LMP Unknown)   SpO2 100%   BMI 26.87 kg/m²   Pain Disability Index Review:      12/26/2023    10:40 AM 11/28/2023    11:25 AM 5/15/2023     9:40 AM   Last 3 PDI Scores   Pain Disability Index (PDI) 56 51 41         PHYSICAL EXAMINATION: Limited due to nature of virtual examw    General appearance: Well appearing, in no acute distress, alert and oriented x3.  Psych:  Mood and affect appropriate.  Skin: Skin color normal, no rashes or lesions, in both upper and lower body.  Extremities: Moves all visualized extremities freely.      PREVIOUS PHYSICAL EXAM: Limited due to nature of virtual exam  Normocephalic.  Atraumatic.  Affect appropriate.  Breathing unlabored.  Extra ocular muscles intact.           General    Vitals reviewed.    General Musculoskeletal Exam   Gait: antalgic     Right Ankle/Foot Exam     Tests   Heel Walk: able to perform  Tiptoe Walk: able to perform    Left Ankle/Foot Exam     Tests   Heel Walk: able to perform  Tiptoe Walk: able to perform      Right Hip Exam     Range of Motion   External rotation:  normal   Internal rotation:  normal "     Tests   Pain w/ forced internal rotation (SANDRA): absent  Elias: negative    Other   Sensation: normal  Left Hip Exam     Range of Motion   External rotation:  normal   Internal rotation: normal     Tests   Pain w/ forced internal rotation (SANDRA): absent  Elias: negative    Other   Sensation: normal      Back (L-Spine & T-Spine) / Neck (C-Spine) Exam     Back (L-Spine & T-Spine) Range of Motion   Extension:  abnormal Back extension: Limited due to pain.  Flexion:  abnormal     Back (L-Spine & T-Spine) Tests   Right Side Tests  Femoral Stretch: negative  Left Side Tests  Femoral Stretch: negative    Comments:   positive sacroiliac joint pain with finger Destin test      Muscle Strength   Right Lower Extremity   Hip Flexion: 5/5   Quadriceps:  5/5   Hamstrin/5   Gastrocsoleus:  5/5   EHL:  5/5  Left Lower Extremity   Hip Flexion: 5/5   Quadriceps:  5/5   Hamstrin/5   Gastrocsoleus:  5/5   EHL:  5/5    Reflexes     Left Side  Babinski Sign:  absent  Ankle Clonus:  absent    Right Side   Babinski Sign:  absent  Ankle Clonus:  absent    Vascular Exam       Capillary Refill  Right Hand: normal capillary refill  Left Hand: normal capillary refill        Edema  Right Upper Leg: absent  Left Upper Leg: absent  Positive left hip pain with internal external rotation specially on the lateral aspect.  No groin pain.  Palpation of the joint line produces pain.      Assessment:       Encounter Diagnoses   Name Primary?    Spinal stenosis of lumbar region with neurogenic claudication Yes    Post laminectomy syndrome     Osteonecrosis of mandible            Plan:   We discussed with the patient the assessment and recommendations. The following is the plan we agreed on:    Dc current oxycodone   Ordered dilaudid 2mg QID for pain control  The patient will continue a home exercise routine to help with pain and strengthening.      Return to clinic  for follow-up in 3 months       The above plan and management options were  discussed at length with patient. Patient is in agreement with the above and verbalized understanding.     Tracee Pacheco MD  04/17/2024     I have personally taken the history and examined this patient and agree with the resident's note as stated above.

## 2024-04-18 DIAGNOSIS — M79.18 MYOFASCIAL PAIN: ICD-10-CM

## 2024-04-18 RX ORDER — METHOCARBAMOL 750 MG/1
750 TABLET, FILM COATED ORAL 4 TIMES DAILY
Qty: 120 TABLET | Refills: 0 | Status: CANCELLED | OUTPATIENT
Start: 2024-04-18 | End: 2024-05-18

## 2024-04-22 ENCOUNTER — PATIENT MESSAGE (OUTPATIENT)
Dept: PAIN MEDICINE | Facility: CLINIC | Age: 64
End: 2024-04-22
Payer: MEDICARE

## 2024-04-22 DIAGNOSIS — M06.9 RHEUMATOID ARTHRITIS, INVOLVING UNSPECIFIED SITE, UNSPECIFIED WHETHER RHEUMATOID FACTOR PRESENT: ICD-10-CM

## 2024-04-22 DIAGNOSIS — M79.18 MYOFASCIAL PAIN: ICD-10-CM

## 2024-04-22 RX ORDER — METHOCARBAMOL 750 MG/1
750 TABLET, FILM COATED ORAL 4 TIMES DAILY
Qty: 120 TABLET | Refills: 0 | Status: SHIPPED | OUTPATIENT
Start: 2024-04-22 | End: 2024-05-26

## 2024-04-22 RX ORDER — SARILUMAB 200 MG/1.14ML
200 INJECTION, SOLUTION SUBCUTANEOUS
Qty: 2.28 ML | Refills: 1 | Status: ACTIVE | OUTPATIENT
Start: 2024-04-22

## 2024-05-02 ENCOUNTER — PATIENT MESSAGE (OUTPATIENT)
Dept: GASTROENTEROLOGY | Facility: CLINIC | Age: 64
End: 2024-05-02
Payer: MEDICARE

## 2024-05-02 ENCOUNTER — PATIENT MESSAGE (OUTPATIENT)
Dept: CARDIOLOGY | Facility: CLINIC | Age: 64
End: 2024-05-02
Payer: MEDICARE

## 2024-05-08 ENCOUNTER — TELEPHONE (OUTPATIENT)
Dept: GASTROENTEROLOGY | Facility: CLINIC | Age: 64
End: 2024-05-08
Payer: MEDICARE

## 2024-05-08 DIAGNOSIS — M19.90 OSTEOARTHRITIS, UNSPECIFIED OSTEOARTHRITIS TYPE, UNSPECIFIED SITE: ICD-10-CM

## 2024-05-08 RX ORDER — DICLOFENAC SODIUM 10 MG/G
GEL TOPICAL
Qty: 300 G | Refills: 2 | Status: SHIPPED | OUTPATIENT
Start: 2024-05-08

## 2024-05-08 NOTE — PROGRESS NOTES
"[facility] - [facility]  Orthopedics  Progress Note    Patient Name: Joyce Peterson  MRN: [id number]  Admission Date: 6/22/2023  Hospital Length of Stay: 1 days  Attending Provider: Jh Mosqueda MD  Primary Care Provider: Krystal Singleton,   Follow-up For: Procedure(s) (LRB):  FUSION, SPINE, LUMBAR, TLIF, POSTERIOR APPROACH, USING PEDICLE SCREW L4/5 spinewave SNS:SSEP/EMG (N/A)    Post-Operative Day: 1 Day Post-Op  Subjective:     Principal Problem:Lumbar stenosis    Principal Orthopedic Problem: s/p L4/L5 TLIF on 6/22    Interval History: [place]ae[place]. VSS. Pain controlled. Complaining of some wheezing this AM. Dressings saturated overnight, changed today. Drain output 110cc since OR.    Review of patient's allergies indicates:   Allergen Reactions    Actemra [tocilizumab] Other (See Comments)     Severe dizziness    Codeine Nausea And Vomiting and Hives    Gold au 198 Hives and Rash    Hydroxychloroquine Other (See Comments)     Can't remember the reaction      Iodinated contrast media Other (See Comments)     Other reaction(s): BURNING ALL OVER    Iodine Other (See Comments) and Hives     Other reaction(s): BURNING ALL OVER - Iodine dye - Not topical    Ondansetron Nausea And Vomiting    Sulfa (sulfonamide antibiotics) Other (See Comments)     Can't remember the reaction    Zofran [ondansetron hcl (pf)] Nausea And Vomiting     Pt reports that last time she received zofran she started vomiting again    Methotrexate analogues Nausea Only    Pneumococcal vaccine Other (See Comments)    Pneumovax 23 [pneumococcal 23-argenis ps vaccine] Other (See Comments)     "sick"    Methotrexate Nausea Only       Current Facility-Administered Medications   Medication    0.9%  NaCl infusion    acetaminophen tablet 650 mg    atorvastatin tablet 80 mg    ceFAZolin 2 g in dextrose 5 % in water (D5W) 5 % 50 mL IVPB (MB+)    famotidine tablet 20 mg    furosemide tablet 20 mg    heparin (porcine) injection 5,000 Units    " What Type Of Note Output Would You Prefer (Optional)?: Standard Output HYDROmorphone tablet 2 mg    And    HYDROmorphone tablet 4 mg    leflunomide tablet 20 mg    methocarbamoL tablet 750 mg    mupirocin 2 % ointment    oxybutynin 24 hr tablet 5 mg    oxyCODONE immediate release tablet 5 mg    oxyCODONE immediate release tablet Tab 10 mg    polyethylene glycol packet 17 g    pregabalin capsule 100 mg    promethazine tablet 25 mg    senna-docusate 8.6-50 mg per tablet 1 tablet    sodium chloride 0.9% flush 5 mL     Objective:     Vital Signs (Most Recent):  Temp: 99.2 °F (37.3 °C) (06/23/23 0443)  Pulse: 91 (06/23/23 0443)  Resp: 16 (06/23/23 0443)  BP: (!) 109/55 (06/23/23 0443)  SpO2: 95 % (06/23/23 0443) Vital Signs (24h Range):  Temp:  [97.9 °F (36.6 °C)-99.2 °F (37.3 °C)] 99.2 °F (37.3 °C)  Pulse:  [80-95] 91  Resp:  [12-22] 16  SpO2:  [94 %-100 %] 95 %  BP: ()/(54-74) 109/55           There is no height or weight on file to calculate BMI.      Intake/Output Summary (Last 24 hours) at 6/23/2023 0630  Last data filed at 6/23/2023 0612  Gross per 24 hour   Intake 1150 ml   Output 110 ml   Net 1040 ml        Ortho/SPM Exam   Awake/alert/oriented x3, No acute distress, Afebrile, Vital signs stable  Good inspiratory effort with unlaboured breathing  Dressings c/d/I  Drain in place  NVI BUE/BLE    Significant Labs: All pertinent labs within the past 24 hours have been reviewed.    Significant Imaging: I have reviewed all pertinent imaging results/findings.    Assessment/Plan:     * Lumbar stenosis  Joyce Peterson is a 62 y.o. female s/p L4/L5 TLIF on 6/22      Pain control: MM  PT/OT: WBAT BLE  DVT PPx: heparin 5k tid, SCDs at all times when not ambulating  Abx: postop Ancef  Labs: stable  Drain: 110cc since OR  Cervantes: dced    Dispo: f/u PT recs          Guzman Mohan MD  Orthopedics  [facility] - Surgery   How Severe Are Your Spot(S)?: mild Have Your Spot(S) Been Treated In The Past?: has not been treated Hpi Title: Evaluation of Skin Lesions

## 2024-05-08 NOTE — TELEPHONE ENCOUNTER
I called & spoke with the participant. She states that she had to get a PICC line put in & they have her on Daptomycin & Ertapenem. I checked the conmed list for Cisapride & these were not listed on that list.

## 2024-05-10 RX ORDER — NAPROXEN 500 MG/1
500 TABLET ORAL 2 TIMES DAILY PRN
Qty: 180 TABLET | Refills: 0 | Status: SHIPPED | OUTPATIENT
Start: 2024-05-10 | End: 2024-06-06

## 2024-05-13 DIAGNOSIS — Z12.31 ENCOUNTER FOR SCREENING MAMMOGRAM FOR MALIGNANT NEOPLASM OF BREAST: Primary | ICD-10-CM

## 2024-05-15 ENCOUNTER — PATIENT MESSAGE (OUTPATIENT)
Dept: PAIN MEDICINE | Facility: CLINIC | Age: 64
End: 2024-05-15
Payer: MEDICARE

## 2024-05-17 ENCOUNTER — TELEPHONE (OUTPATIENT)
Dept: PAIN MEDICINE | Facility: CLINIC | Age: 64
End: 2024-05-17
Payer: MEDICARE

## 2024-05-17 DIAGNOSIS — M87.9 OSTEONECROSIS OF MANDIBLE: ICD-10-CM

## 2024-05-17 DIAGNOSIS — M96.1 POST LAMINECTOMY SYNDROME: ICD-10-CM

## 2024-05-17 DIAGNOSIS — M48.062 SPINAL STENOSIS OF LUMBAR REGION WITH NEUROGENIC CLAUDICATION: ICD-10-CM

## 2024-05-17 RX ORDER — HYDROMORPHONE HYDROCHLORIDE 4 MG/1
2 TABLET ORAL EVERY 12 HOURS PRN
Qty: 60 TABLET | Refills: 0 | Status: SHIPPED | OUTPATIENT
Start: 2024-05-18

## 2024-05-17 NOTE — TELEPHONE ENCOUNTER
----- Message from Da Tinajero sent at 5/17/2024  2:30 PM CDT -----  Contact: starr  Type:  Patient Call          Who Called: Patient         Does the patient know what this is regarding?: Requesting a call back about having a refill on RxHYDROmorphone (DILAUDID) 4 MG table ; pt is completley out of her medication ; Pt said that this is her second call ; please advise            Would the patient rather a call back or a response via [facility]?call           Best Call Back Number: [phone]             Additional Information:       [facility]/pharmacy #[id number] - [address] AT CORNER OF [address] [address]  Phone: [phone] Fax: [phone]

## 2024-05-17 NOTE — TELEPHONE ENCOUNTER
----- Message from Leonor Arreola sent at 5/17/2024 10:34 AM CDT -----  Regarding: refill request  Type:  Patient Returning Call      Name of who is calling:patient        What is request in detail:patient is requesting a call back about refill request that was sent to office this past Wednesday.        Can clinic reply by [facility]:no        What [facility] back [facility]:[phone]

## 2024-05-17 NOTE — TELEPHONE ENCOUNTER
Patient requesting refill on HYDROmorphone (DILAUDID) 4 MG tablet   Last office visit 05/17/24   shows last refill on 04/18/24  Patient does have a pain contract on file with [facility]  Patient last UDS Not on file was not consistent with current therapy

## 2024-05-17 NOTE — TELEPHONE ENCOUNTER
----- Message from Leonor Arreola sent at 5/17/2024 10:27 AM CDT -----  Regarding: refill  Type:  RX Refill Request    Who Called: Joyce    Refill or New Rx:refill    RX Name and Strength:HYDROmorphone (DILAUDID) 4 MG tablet    How is the patient currently taking it? (ex. 1XDay):    Is this a 30 day or 90 day RX     Preferred Pharmacy with phone number:[facility]/pharmacy #[id number] [address] AT [facility]   Phone: [phone]  Fax: [phone]          Local or Mail Order:local    Ordering Provider:    Would the patient rather a call back or a response via [facility]? call    Best Call Back Number:[phone]    Additional Information: patient will be out of medication this weekend, please when recieved

## 2024-05-17 NOTE — TELEPHONE ENCOUNTER
----- Message from Da Tinajero sent at 5/17/2024  8:30 AM CDT -----  Type:  Patient Call          Who Called: patient         Does the patient know what this is regarding?: Requesting a call back for a refill on Rx HYDROmorphone (DILAUDID) 4 MG tablet ; please advise           Would the patient rather a call back or a response via [facility]? call          Best Call Back Number: [phone]             Additional Information:   [facility]  [address] [address]  [place] [address]  Phone: [phone] Fax: [phone]

## 2024-05-17 NOTE — TELEPHONE ENCOUNTER
Pt medication request has been signed by  and scheduled for  tomorrow 05/18/24. Pt has been notified of this information.

## 2024-05-20 ENCOUNTER — TELEPHONE (OUTPATIENT)
Dept: PAIN MEDICINE | Facility: CLINIC | Age: 64
End: 2024-05-20
Payer: MEDICARE

## 2024-05-20 NOTE — TELEPHONE ENCOUNTER
----- Message from Darya Feliciano sent at 5/20/2024  2:28 PM CDT -----   Name of Who is Calling:     What is the request in detail:  patient request call back in reference to medication   HYDROmorphone (DILAUDID) 4 MG tablet    / patient state she is to take medication every 2hrs / patient picked up medication and it has every 12 hrs    Please contact to further discuss and advise      Can the clinic reply by [facility]:     What Number to Call Back if not in [facility]:   [phone]

## 2024-05-20 NOTE — TELEPHONE ENCOUNTER
Staff spoke with patient and verbalized understanding that she was shorted 30 tablets and she can call in a week before her medication is out to get her 30 day script.

## 2024-05-30 DIAGNOSIS — M79.7 FIBROMYALGIA: ICD-10-CM

## 2024-05-30 RX ORDER — PREGABALIN 150 MG/1
150 CAPSULE ORAL 3 TIMES DAILY
Qty: 90 CAPSULE | Refills: 2 | Status: SHIPPED | OUTPATIENT
Start: 2024-05-30

## 2024-06-05 ENCOUNTER — PATIENT MESSAGE (OUTPATIENT)
Dept: OPTOMETRY | Facility: CLINIC | Age: 64
End: 2024-06-05
Payer: MEDICARE

## 2024-06-05 DIAGNOSIS — H04.123 BILATERAL DRY EYES: ICD-10-CM

## 2024-06-05 RX ORDER — CYCLOSPORINE 0.5 MG/ML
1 EMULSION OPHTHALMIC 2 TIMES DAILY
Qty: 180 EACH | Refills: 3 | Status: SHIPPED | OUTPATIENT
Start: 2024-06-05 | End: 2024-06-12 | Stop reason: SDUPTHER

## 2024-06-06 RX ORDER — NAPROXEN 500 MG/1
500 TABLET ORAL 2 TIMES DAILY PRN
Qty: 180 TABLET | Refills: 0 | Status: SHIPPED | OUTPATIENT
Start: 2024-06-06

## 2024-06-12 DIAGNOSIS — H04.123 BILATERAL DRY EYES: ICD-10-CM

## 2024-06-12 RX ORDER — CYCLOSPORINE 0.5 MG/ML
1 EMULSION OPHTHALMIC 2 TIMES DAILY
Qty: 180 EACH | Refills: 3 | Status: SHIPPED | OUTPATIENT
Start: 2024-06-12 | End: 2024-09-10

## 2024-06-20 ENCOUNTER — PATIENT MESSAGE (OUTPATIENT)
Dept: FAMILY MEDICINE | Facility: CLINIC | Age: 64
End: 2024-06-20
Payer: MEDICARE

## 2024-06-21 ENCOUNTER — TELEPHONE (OUTPATIENT)
Dept: FAMILY MEDICINE | Facility: CLINIC | Age: 64
End: 2024-06-21
Payer: MEDICARE

## 2024-06-21 DIAGNOSIS — H04.123 BILATERAL DRY EYES: Primary | ICD-10-CM

## 2024-06-21 DIAGNOSIS — N30.01 ACUTE CYSTITIS WITH HEMATURIA: Primary | ICD-10-CM

## 2024-06-21 RX ORDER — CIPROFLOXACIN 500 MG/1
500 TABLET ORAL EVERY 12 HOURS
Qty: 10 TABLET | Refills: 0 | Status: SHIPPED | OUTPATIENT
Start: 2024-06-21 | End: 2024-06-26

## 2024-06-21 RX ORDER — LIFITEGRAST 50 MG/ML
SOLUTION/ DROPS OPHTHALMIC
Qty: 60 ML | Refills: 10 | Status: SHIPPED | OUTPATIENT
Start: 2024-06-21

## 2024-06-21 NOTE — TELEPHONE ENCOUNTER
Find out if we can get a copy of UA and culture since looks like was done outside [facility], I need the culture to treat, if not done pt needs to do evisit so I can order urine studies and she would need to bring sample

## 2024-06-21 NOTE — TELEPHONE ENCOUNTER
----- Message from Karla Valentine sent at 6/21/2024  9:01 AM CDT -----  Type:  Needs Medical Advice    Who Called:  Joyce Peterson  Symptoms (please be specific):  Bladder infection   How long has patient had these symptoms:  4 days  Pharmacy name and phone #:  [facility]   Phone: [phone]  Fax: [phone]  Would the patient rather a call back or a response via [facility]?  call  Best Call Back Number:   [phone]  Additional Information:  Pt would like to be prescribed medication for her symptoms.

## 2024-06-27 ENCOUNTER — OFFICE VISIT (OUTPATIENT)
Dept: PAIN MEDICINE | Facility: CLINIC | Age: 64
End: 2024-06-27
Attending: ANESTHESIOLOGY
Payer: MEDICARE

## 2024-06-27 VITALS
HEART RATE: 93 BPM | DIASTOLIC BLOOD PRESSURE: 63 MMHG | BODY MASS INDEX: 28.7 KG/M2 | TEMPERATURE: 98 F | WEIGHT: 151.88 LBS | SYSTOLIC BLOOD PRESSURE: 96 MMHG

## 2024-06-27 DIAGNOSIS — Z98.1 S/P LUMBAR SPINAL FUSION: ICD-10-CM

## 2024-06-27 DIAGNOSIS — I25.10 ATHEROSCLEROTIC HEART DISEASE OF NATIVE CORONARY ARTERY WITHOUT ANGINA PECTORIS: ICD-10-CM

## 2024-06-27 DIAGNOSIS — G57.03 PIRIFORMIS SYNDROME OF BOTH SIDES: Primary | ICD-10-CM

## 2024-06-27 DIAGNOSIS — M70.62 GREATER TROCHANTERIC BURSITIS OF BOTH HIPS: ICD-10-CM

## 2024-06-27 DIAGNOSIS — M70.61 GREATER TROCHANTERIC BURSITIS OF BOTH HIPS: ICD-10-CM

## 2024-06-27 PROCEDURE — 3074F SYST BP LT 130 MM HG: CPT | Mod: CPTII,S$GLB,,

## 2024-06-27 PROCEDURE — 1160F RVW MEDS BY RX/DR IN RCRD: CPT | Mod: CPTII,S$GLB,,

## 2024-06-27 PROCEDURE — 99999 PR PBB SHADOW E&M-EST. PATIENT-LVL IV: CPT | Mod: PBBFAC,,,

## 2024-06-27 PROCEDURE — 3008F BODY MASS INDEX DOCD: CPT | Mod: CPTII,S$GLB,,

## 2024-06-27 PROCEDURE — 1159F MED LIST DOCD IN RCRD: CPT | Mod: CPTII,S$GLB,,

## 2024-06-27 PROCEDURE — 3078F DIAST BP <80 MM HG: CPT | Mod: CPTII,S$GLB,,

## 2024-06-27 PROCEDURE — 99214 OFFICE O/P EST MOD 30 MIN: CPT | Mod: S$GLB,,,

## 2024-06-27 RX ORDER — TIZANIDINE 4 MG/1
4 TABLET ORAL 3 TIMES DAILY PRN
Qty: 90 TABLET | Refills: 0 | Status: SHIPPED | OUTPATIENT
Start: 2024-06-27 | End: 2024-07-03 | Stop reason: SDUPTHER

## 2024-06-27 NOTE — PROGRESS NOTES
Pain Follow up Note- Established Patient         Subjective:      Patient ID: Joyce Peterson is a 63 y.o. female.    Chief Complaint: Hip Pain (Both hips; Pain radiates down both legs)    Referred by: No ref. provider found       Back Pain    Hip Pain       Original HPI  She is here for follow-up for left hip pain.  She said the radiofrequency ablation she had in July of 2022 helped her tremendously.  Three months ago her pain started again on the lateral aspect of the left hip.  She is status post ORIF on the left hip.  She has heterotopic ossification.  She would like to repeat the procedure.  She said she will have to put off the spinal cord stimulator until an MRI is executed ordered by another provider.  No new symptomatology otherwise.        Interval History 6/27/2024:  Joyce Peterson returns to clinic for follow-up for chronic back pain. She is s/p L2-pelvis fusion on 6/12/2024. She has noticed increased pain over the last 3 nights. She had a visit with NS on 6/25/2024 and was told this was normal healing and nerve regeneration from the surgery. The patient said NSGY will not prescribe her pain longer than 6 weeks s/p surgery.  Her current regimen from [facility] is oxycodone 10 mg q4 hours, hydromorphone 1 mg for breakthrough pain.  She also takes Lyrica 150 mg TID and Robaxin 750 mg QID.  She is not supposed to take Naproxen s/p surgery, but has taken it a couple times for severe pain.  The patient denies fever/night sweats, urinary incontinence, bowel incontinence, significant weight changes, significant motor weakness or changes, or loss of sensations. Her pain today is 6/10.    Interval History 4/17/2024:   Patient returns to clinic for follow up of low back and hip pain. She was scheduled for SCS trial however trial was not completed due to patient preference. She states she has been seen by neurosugery and orthopedic surgery, for which she plans to undergo back surgery. 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  She reports pain has been worsening  since last visit. She states she continues to take lyrica TID, robaxin 750mg QID, oxycodone 5 q.i.d. p.r.n.  for pain control, she states this has not provided her with relief x the last 3 weeks. She reports she has not been able to participate in HEP due to pain. Pain level at time of exam is reported to be 8.5/10. Patient requests she wants to return to a previous regimen of percocet and dilaudid until she is able to have back procedure. She expresses excruciating pain that runs down bilateral pain with any movement and with coughing.     Interval history  She returns for virtual follow up of back and hip pain. At her last OV, Dr. Mayfield recommended [place] SCS trial. She had her first part of the psych consult and has the second part this afternoon. She has had limited benefit with multiple interventions in the past including back surgery, procedures, PT and medications. She would like to move forward with SCS trial. Her pain today is 10/10.    Interval History 1/16/24:  Joyce Peterson returns for virtual follow up of low back and left hip pain. She is s/p L4/5 TLIF 6/22/23. She is planning for a left hip RFA and SCS trial with us. 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 She has since had an MRI of her L Spine in December that demonstrated a large amount of extruded disc material extending superiorly from L4-5 into the spinal canal with additional grade 2 anterolisthesis at this level contributing to severe spinal canal stenosis. She reports intermittent weakness in her left > right leg. Patient denies saddle anesthesia, bladder/bowel incontinence, ataxia, or increased falls. Shopping cart sign +. She is able to walk 1 block before needing to stop. Pain improves upon sitting. Pain currently ranges from 5-9/10, currently a  7/10. Pain radiates down her both her legs anterolaterally down to the ankle with additional shin pain. She has been on percocet 5-325 QID PRN and lyrica 150mg TID. She received opioids from Ortho 5/325 in October addition to our Rx. Ortho saw patient today and is recommending an L2-Pelvis    Pertinent Surgeries:  6/12/2024 - L2-Pelvis Fusion ([place])    Past Medical History:   Diagnosis Date    Acid reflux     Allergy     Anemia     Asthma     Coronary artery disease     CS (cervical spondylosis) 03/08/2013    Degenerative disc disease     Degenerative joint disease of hindfoot, left 6/28/2022    Dry eyes     Dry mouth     Gastroparesis     History of methotrexate therapy 01/19/2022    Hyperlipidemia     Lateral meniscus derangement 04/06/2016    Lobular carcinoma in situ     Lumbar spondylosis 03/08/2013    Osteoarthritis     Osteoporosis     Pes planovalgus, acquired, left 6/28/2022    Rheumatoid arthritis(714.0)     Rupture of left triceps tendon 10/17/2018    Umbilical hernia 08/13/2015       Past Surgical History:   Procedure Laterality Date    BREAST BIOPSY Left 01/29/2002    core bx    CARPAL TUNNEL RELEASE Right 05/2017    CHOLECYSTECTOMY  2004    COLONOSCOPY      10/11    COLONOSCOPY N/A 6/29/2017    Procedure: COLONOSCOPY;  Surgeon: López Moore MD;  Location: [facility] ENDO ([address] [place] [address]);  Service: Endoscopy;  Laterality: N/A;    EPIDURAL STEROID INJECTION N/A 11/18/2021    Procedure: INJECTION, STEROID, EPIDURAL, L5-S1 IL NEED CONSENT;  Surgeon: Tj Mayfield MD;  Location: [facility] PAIN MGT;  Service: Pain Management;  Laterality: N/A;    EPIDURAL STEROID INJECTION N/A 9/29/2022    Procedure: LUMBAR L3/L4 IL MADELINE CONTRAST;  Surgeon: Tj Mayfield MD;  Location: [facility] PAIN MGT;  Service: Pain Management;  Laterality: N/A;    EPIDURAL STEROID INJECTION N/A 12/12/2022    Procedure: INJECTION, STEROID, EPIDURAL L5/S1 IL CONTRAST;  Surgeon: Tj Mayfield MD;  Location: [facility] PAIN MGT;  Service: Pain Management;  Laterality:  N/A;    EPIDURAL STEROID INJECTION N/A 4/6/2023    Procedure: INJECTION, STEROID, EPIDURAL L5/S1;  Surgeon: Tj Mayfield MD;  Location: [facility] PAIN MGT;  Service: Pain Management;  Laterality: N/A;    FOOT ARTHRODESIS Left 1/11/2023    Procedure: FUSION, FOOT;  Surgeon: Ariella Finnegan MD;  Location: [facility] OR;  Service: Orthopedics;  Laterality: Left;  nimbus    FUSION, SPINE, LUMBAR, TLIF, POSTERIOR APPROACH, USING PEDICLE SCREW N/A 6/22/2023    Procedure: FUSION, SPINE, LUMBAR, TLIF, POSTERIOR APPROACH, USING PEDICLE SCREW L4/5 spinewave SNS:SSEP/EMG;  Surgeon: Jh Mosqueda MD;  Location: [facility] OR [address] [place] [address];  Service: Neurosurgery;  Laterality: N/A;    HARVESTING OF BONE GRAFT Left 1/11/2023    Procedure: SURGICAL PROCUREMENT, BONE GRAFT;  Surgeon: Ariella Finnegan MD;  Location: [facility] OR;  Service: Orthopedics;  Laterality: Left;    INJECTION OF ANESTHETIC AGENT AROUND NERVE Bilateral 8/23/2021    Procedure: BLOCK, NERVE, MEDIAL BRANCH L3,L4,L5;  Surgeon: Tj Mayfield MD;  Location: [facility] PAIN MGT;  Service: Pain Management;  Laterality: Bilateral;  1 of 2    INJECTION OF ANESTHETIC AGENT AROUND NERVE Bilateral 8/26/2021    Procedure: BLOCK, NERVE, MEDIAL BRANCH L3,L4,L5;  Surgeon: Tj Mayfield MD;  Location: [facility] PAIN MGT;  Service: Pain Management;  Laterality: Bilateral;  2 of 2    INJECTION OF ANESTHETIC AGENT AROUND NERVE Left 7/7/2022    Procedure: BLOCK, NERVE, LEFT OBTURATOR AND FEMORAL;  Surgeon: Tj Mayfield MD;  Location: [facility] PAIN MGT;  Service: Pain Management;  Laterality: Left;    INJECTION OF FACET JOINT Bilateral 3/12/2020    Procedure: FACET JOINT INJECTION (LUMBAR BLOCK) BILATERAL L4-5 AND L5-S1 DIRECT REFERRAL;  Surgeon: Tj Mayfield MD;  Location: [facility] PAIN MGT;  Service: Pain Management;  Laterality: Bilateral;  NEEDS CONSENT    INJECTION OF FACET JOINT Bilateral 3/29/2021    Procedure: INJECTION, FACET JOINT L3/4, L4/5, L5/S1;  Surgeon: Tj Mayfield MD;  Location: [facility] PAIN MGT;  Service: Pain  Management;  Laterality: Bilateral;    INJECTION OF JOINT Right 7/30/2020    Procedure: INJECTION, JOINT, RIGHT HIP Interarticular under flouro;  Surgeon: Tj Mayfield MD;  Location: [facility] PAIN MGT;  Service: Pain Management;  Laterality: Right;  INJECTION, JOINT, RIGHT HIP Interarticular under flouro    INJECTION OF JOINT Right 2/21/2022    Procedure: Injection, Joint RIGHT ISCHIAL BURSA;  Surgeon: Tj Mayfield MD;  Location: [facility] PAIN MGT;  Service: Pain Management;  Laterality: Right;    INTRAMEDULLARY RODDING OF FEMUR Left 5/21/2019    Procedure: INSERTION, INTRAMEDULLARY ARIEL, FEMUR;  Surgeon: López Duff MD;  Location: [facility] OR 2ND FLR;  Service: Orthopedics;  Laterality: Left;    LENGTHENING OF TENDON OF LOWER EXTREMITY Left 1/11/2023    Procedure: LENGTHENING, TENDON, LOWER EXTREMITY;  Surgeon: Ariella Finnegan MD;  Location: [facility] OR;  Service: Orthopedics;  Laterality: Left;    MAGNETIC RESONANCE IMAGING N/A 5/29/2023    Procedure: MRI (Magnetic Resonance Imagine);  Surgeon: Sujey Surgeon;  Location: [facility];  Service: Anesthesiology;  Laterality: N/A;    MAGNETIC RESONANCE IMAGING N/A 1/10/2024    Procedure: MRI (Magnetic Resonance Imagine);  Surgeon: Sujey Robin;  Location: [facility];  Service: Anesthesiology;  Laterality: N/A;    RADIOFREQUENCY ABLATION Left 9/20/2021    Procedure: RADIOFREQUENCY ABLATION left L3,4,5 RFA  1 of 2  CONSENT NEEDED;  Surgeon: Tj Mayfield MD;  Location: [facility] PAIN MGT;  Service: Pain Management;  Laterality: Left;    RADIOFREQUENCY ABLATION Right 10/4/2021    Procedure: RADIOFREQUENCY ABLATION  right L3,4,5 RFA   2 of 2  CONSENT NEEDED;  Surgeon: Tj Mayfield MD;  Location: [facility] PAIN [facility]GT;  Service: Pain Management;  Laterality: Right;    RADIOFREQUENCY ABLATION Left 4/21/2022    Procedure: Radiofrequency Ablation LEFT L3,L4,L5;  Surgeon: Tj Mayfield MD;  Location: [facility] PAIN MGT;  Service: Pain Management;  Laterality: Left;    RADIOFREQUENCY ABLATION Right 5/12/2022     Procedure: Radiofrequency Ablation RIGHT L3,L4,L5;  Surgeon: Tj Mayfield MD;  Location: [facility] PAIN MGT;  Service: Pain Management;  Laterality: Right;    RADIOFREQUENCY ABLATION Left 7/25/2022    Procedure: Radiofrequency Ablation Left Femoral & Obturator;  Surgeon: Tj Mayfield MD;  Location: [facility] PAIN MGT;  Service: Pain Management;  Laterality: Left;    REPAIR OF TRICEPS TENDON Left 10/17/2018    Procedure: REPAIR, TENDON, TRICEPS left elbow;  Surgeon: Staci Yarbrough MD;  Location: [facility] OR 1ST FLR;  Service: Orthopedics;  Laterality: Left;  Anesthesia: General and regional. PRONE, k-wire , hand pan 1 and pan 2, CALL ARTHREX/Tamera notified 10-12 LO    TONSILLECTOMY      TRANSFORAMINAL EPIDURAL INJECTION OF STEROID Right 8/31/2020    Procedure: INJECTION, STEROID, EPIDURAL, TRANSFORAMINAL,  APPROACH, L3-L4 and L4-L5 need consent;  Surgeon: Tj Mayfield MD;  Location: [facility] PAIN [facility]GT;  Service: Pain Management;  Laterality: Right;    TRANSFORAMINAL EPIDURAL INJECTION OF STEROID Bilateral 7/23/2021    Procedure: INJECTION, STEROID, EPIDURAL, TRANSFORAMINAL APPROACH L4/5;  Surgeon: Tj Mayfield MD;  Location: [facility] PAIN [facility]GT;  Service: Pain Management;  Laterality: Bilateral;    TRANSFORAMINAL EPIDURAL INJECTION OF STEROID Bilateral 9/25/2023    Procedure: LUMBAR TRANSFORAMINAL BILATERAL  L2/3 DIRECT REFERRAL;  Surgeon: Tj Mayfield MD;  Location: [facility] PAIN MGT;  Service: Pain Management;  Laterality: Bilateral;    TRANSFORAMINAL EPIDURAL INJECTION OF STEROID Left 10/18/2023    Procedure: LUMBAR TRANSFORAMINAL LEFT L3/4 AND L4/5 * CLEARANCE IN CHART*;  Surgeon: Tj Mayfield MD;  Location: [facility] PAIN [facility]GT;  Service: Pain Management;  Laterality: Left;    TRIGGER POINT INJECTION N/A 7/23/2021    Procedure: INJECTION, TRIGGER POINT SCAPULAR;  Surgeon: Tj Mayfield MD;  Location: [facility] PAIN MGT;  Service: Pain Management;  Laterality: N/A;    TUBAL LIGATION  2003    UPPER GASTROINTESTINAL ENDOSCOPY       "10/11    uterine ablation  2003       Review of patient's allergies indicates:   Allergen Reactions    Actemra [tocilizumab] Other (See Comments)     Severe dizziness    Codeine Nausea And Vomiting and Hives    Gold au 198 Hives and Rash    Hydroxychloroquine Other (See Comments)     Can't remember the reaction      Iodinated contrast media Other (See Comments)     Other reaction(s): BURNING ALL OVER    Iodine Other (See Comments) and Hives     Other reaction(s): BURNING ALL OVER - Iodine dye - Not topical    Ondansetron Nausea And Vomiting    Sulfa (sulfonamide antibiotics) Other (See Comments)     Can't remember the reaction    Zofran [ondansetron hcl (pf)] Nausea And Vomiting     Pt reports that last time she received zofran she started vomiting again    Methotrexate analogues Nausea Only    Pneumococcal vaccine Other (See Comments)    Pneumovax 23 [pneumococcal 23-argenis ps vaccine] Other (See Comments)     "sick"    Methotrexate Nausea Only       Current Outpatient Medications   Medication Sig Dispense Refill    albuterol (PROVENTIL) 2.5 mg /3 mL (0.083 %) nebulizer solution Take 3 mLs (2.5 mg total) by nebulization every 6 (six) hours as needed for Wheezing. Rescue 75 mL 0    albuterol (PROVENTIL/VENTOLIN HFA) 90 mcg/actuation inhaler Inhale 2 puffs into the lungs every 6 (six) hours as needed. Rescue 20.1 g 11    amoxicillin-clavulanate 875-125mg (AUGMENTIN) 875-125 mg per tablet Take 1 tablet by mouth every 12 hours 20 tablet 0    azelastine (ASTELIN) 137 mcg (0.1 %) nasal spray Use 1 spray (137 mcg total) in each nostril 2 (two) times daily. (Patient taking differently: 1 spray by Nasal route 2 (two) times daily as needed.) 30 mL 1    azithromycin (Z-DEVON) 250 MG tablet Take 2 tablets by mouth on day 1, then take 1 tablet daily until all taken 6 tablet 0    BD ULTRA-FINE JAIME PEN NEEDLE 32 gauge x 5/32" Ndle For Forteo - inject daily 100 each 3    budesonide-formoterol 160-4.5 mcg (SYMBICORT) 160-4.5 " mcg/actuation HFAA Inhale 2 puffs into the lungs every 12 (twelve) hours. 30.6 g 3    calcium citrate-vitamin D3 315-200 mg (CITRACAL+D) 315 mg-5 mcg (200 unit) per tablet Take 1 tablet by mouth 2 (two) times daily.       chlorhexidine (PERIDEX) 0.12 % solution Rinse with 2 teaspoons (10ml) by mouth three times daily 473 mL 0    ciprofloxacin HCl (CIPRO) 500 MG tablet Take 1 tablet by mouth 2 (two) times daily for 3 days 6 tablet 0    cycloSPORINE (RESTASIS) 0.05 % ophthalmic emulsion Place 1 drop into both eyes 2 (two) times daily. 180 each 3    DAPTOmycin (CUBICIN) 500 mg injection Inject into the vein.      diclofenac sodium (VOLTAREN) 1 % Gel APPLY 2 GRAMS TOPICALLY 4 (FOUR) TIMES DAILY AS NEEDED. 300 g 2    ertapenem (INVANZ) 1 gram injection       fluconazole (DIFLUCAN) 150 MG Tab Take 150 mg by mouth as needed.      fluconazole (DIFLUCAN) 150 MG Tab Take  1 tablet by mouth now 1 tablet 1    fluticasone propionate (FLONASE) 50 mcg/actuation nasal spray Use 1 spray (50 mcg total) in each nostril once daily. 16 g 1    HYDROmorphone (DILAUDID) 4 MG tablet Take 0.5 tablets (2 mg total) by mouth every 12 (twelve) hours as needed for Pain. 60 tablet 0    ibuprofen (ADVIL,MOTRIN) 600 MG tablet Take one tablet by mouth every 6 hours as needed for pain 20 tablet 0    ibuprofen (ADVIL,MOTRIN) 800 MG tablet Take 1 tablet by mouth every 6 to 8 hours as needed for pain 30 tablet 0    INV PROPULSID 10 MG Take 2 tablets (20 mg) by mouth 4 (four) times daily. FOR INVESTIGATIONAL USE ONLY. Protocol [id number] Subject ID: [id number]. 1440 each 0    leflunomide (ARAVA) 20 MG Tab Take 1 tablet (20 mg total) by mouth once daily. 90 tablet 0    lifitegrast (XIIDRA) 5 % Dpet INSTILL ONE DROP INTO BOTH EYES TWICE A DAY 60 mL 10    magic mouthwash diphen/antac/lidoc rinse mouth with 5 ml for 30 seconds and spit out, twice daily 150 mL 2    methenamine (HIPREX) 1 gram Tab Take 1 tablet (1 g total) by mouth 2 (two) times daily. 180 tablet  3    mirabegron (MYRBETRIQ) 25 mg Tb24 ER tablet Take 1 tablet (25 mg total) by mouth once daily. 90 tablet 3    montelukast (SINGULAIR) 10 mg tablet Take 1 tablet (10 mg total) by mouth every evening. 90 tablet 3    naloxone (NARCAN) 4 mg/actuation Spry 4mg by nasal route as needed for opioid overdose; may repeat every 2-3 minutes in alternating nostrils until medical help arrives. Call 911 2 each 11    naproxen (NAPROSYN) 500 MG tablet TAKE 1 TABLET BY MOUTH TWICE A DAY AS NEEDED 180 tablet 0    omeprazole (PRILOSEC) 40 MG capsule Take 1 capsule (40 mg total) by mouth every morning. 30 capsule 6    omeprazole-sodium bicarbonate (ZEGERID) 40-1.1 mg-gram per capsule TAKE 1 CAPSULE BY MOUTH EVERY DAY IN THE MORNING 90 capsule 3    POTASSIUM ORAL Take by mouth once daily.      pregabalin (LYRICA) 150 MG capsule Take 1 capsule (150 mg total) by mouth 3 (three) times daily. 90 capsule 2    promethazine (PHENERGAN) 25 MG tablet Take 1 tablet (25 mg total) by mouth every 6 (six) hours as needed for Nausea. 90 tablet 5    rosuvastatin (CRESTOR) 20 MG tablet Take 1 tablet by mouth every day in the evening 90 tablet 3    sarilumab (KEVZARA) 200 mg/1.14 mL Syrg Inject 1.14 mL (200 mg total) into the skin every 14 (fourteen) days. 2.28 mL 1    sucralfate (CARAFATE) 1 gram tablet TAKE 1 TABLET BY MOUTH 4 TIMES DAILY. 360 tablet 2    teriparatide (FORTEO) 20 mcg/dose (600mcg/2.4mL) PnIj Inject 0.08 mLs (20 mcg total) into the skin once daily. 2.4 mL 11    albuterol-ipratropium (DUO-NEB) 2.5 mg-0.5 mg/3 mL nebulizer solution Take 3 mLs by nebulization every 6 (six) hours as needed for Wheezing. Rescue 90 mL 3    apixaban (ELIQUIS) 5 mg Tab Take 2 tablets by mouth twice a day for 7 days and then 1 tablet twice a day thereafter 74 tablet 2    oxyCODONE (ROXICODONE) 10 mg Tab immediate release tablet Take 1 tablet by mouth every 4 (four) hours as needed (acute post op pain) Max Daily Amount: 60 mg 42 tablet 0    predniSONE  (DELTASONE) 5 MG tablet TAKE 1 TABLET BY MOUTH EVERY DAY 90 tablet 1    rosuvastatin (CRESTOR) 20 MG tablet TAKE 1 TABLET BY MOUTH EVERY DAY IN THE EVENING 90 tablet 3    tiZANidine (ZANAFLEX) 4 MG tablet Take 1 tablet (4 mg total) by mouth 3 (three) times daily as needed (muscle spasm). 90 tablet 0     No current facility-administered medications for this visit.       Family History   Problem Relation Name Age of Onset    Cancer Mother 69         Lung Cancer    Emphysema Mother 69     Heart attack Mother 69     Alcohol abuse Mother 69     Cancer Father          Lung Cancer    Osteoarthritis Father      Lung cancer Father      Skin cancer Father      Alcohol abuse Father      Heart disease Brother 57     Heart attack Brother 57     Alcohol abuse Brother 57     Cirrhosis Brother 57     Osteoarthritis Paternal Aunt      Retinal detachment Maternal Aunt      No Known Problems Sister      No Known Problems Maternal Uncle      No Known Problems Paternal Uncle      No Known Problems Maternal Grandmother      No Known Problems Maternal Grandfather      No Known Problems Paternal Grandmother      No Known Problems Paternal Grandfather      Colon cancer Neg Hx      Esophageal cancer Neg Hx      Stomach cancer Neg Hx      Celiac disease Neg Hx      Diabetes Neg Hx      Thyroid disease Neg Hx      Amblyopia Neg Hx      Blindness Neg Hx      Cataracts Neg Hx      Glaucoma Neg Hx      Hypertension Neg Hx      Macular degeneration Neg Hx      Strabismus Neg Hx      Stroke Neg Hx         Social History     Socioeconomic History    Marital status:    Tobacco Use    Smoking status: Never    Smokeless tobacco: Never   Substance and Sexual Activity    Alcohol use: Yes     Comment: 2-3 times per year.    Drug use: No    Sexual activity: Yes     Partners: Male     Birth control/protection: Surgical     Social Determinants of Health     Financial Resource Strain: Low Risk  (6/12/2024)    Received from [facility]    Overall  Financial Resource Strain (CARDIA)     Difficulty of Paying Living Expenses: Not hard at all   Food Insecurity: No Food Insecurity (6/12/2024)    Received from [facility]    Hunger Vital Sign     Worried About Running Out of Food in the Last Year: Never true     Ran Out of Food in the Last Year: Never true   Transportation Needs: No Transportation Needs (6/12/2024)    Received from [facility]    PRAPARE - Transportation     Lack of Transportation (Medical): No     Lack of Transportation (Non-Medical): No   Physical Activity: Patient Unable To Answer (6/12/2024)    Received from [facility]    Exercise Vital Sign     Days of Exercise per Week: Patient unable to answer     Minutes of Exercise per Session: Patient unable to answer   Stress: No Stress Concern Present (6/12/2024)    Received from [facility]    [employer] [place] [employer] [place] [employer] - Occupational Stress Questionnaire     Feeling of Stress : Not at all   Housing Stability: Unknown (12/5/2023)    Housing Stability Vital Sign     Unable to Pay for Housing in the Last Year: Patient refused     Number of Places Lived in the Last Year: 1     Unstable Housing in the Last Year: No       REVIEW OF SYSTEMS:    GENERAL:  No weight loss, malaise or fevers.  HEENT:  Negative for frequent or significant headaches.  NECK:  Negative for lumps, goiter, pain and significant neck swelling.  RESPIRATORY:  Negative for cough, wheezing or shortness of breath.  CARDIOVASCULAR:  Negative for chest pain, leg swelling or palpitations.  GI:  Negative for abdominal discomfort, blood in stools or black stools or change in bowel habits.  MUSCULOSKELETAL:  See HPI. +Back pain.   SKIN:  Negative for lesions, rash, and itching.  PSYCH:  Negative for sleep disturbance, mood disorder and recent psychosocial stressors.  HEMATOLOGY/LYMPHOLOGY:  Negative for prolonged bleeding, bruising easily or swollen nodes.  NEURO:   No history of headaches, syncope, paralysis, seizures or  tremors.  All other reviewed and negative other than HPI.       Objective:   BP 96/63   Pulse 93   Temp 97.6 °F (36.4 °C)   Wt 68.9 kg (151 lb 14.4 oz)   LMP  (LMP Unknown)   BMI 28.70 kg/m²   Pain Disability Index Review:      6/27/2024     2:45 PM 12/26/2023    10:40 AM 11/28/2023    11:25 AM   Last 3 PDI Scores   Pain Disability Index (PDI) 60 56 51       PHYSICAL EXAMINATION:    GENERAL APPEARANCE: Well appearing, in no acute distress.  PSYCH:  Mood and affect appropriate.  SKIN: Skin color, texture, turgor normal, no rashes or lesions to visible areas.  HEAD/FACE:  Normocephalic, atraumatic.  NECK: No pain to palpation over the cervical paraspinous muscles. Spurling Negative. No pain with neck flexion, extension, or lateral flexion.   CARDIO: Rate regular.  No lower extremity edema. Capillary refill <2 seconds.   PULM: Bilateral chest rise. No apparent respiratory distress.   GI:  Non-distended  BACK: Back brace in place. Straight leg raising in the sitting position is negative to radicular pain. Limited ROM in all planes.  EXTREMITIES: Peripheral joint ROM is full and pain free without obvious instability or laxity in all four extremities. No deformities, edema, or skin discoloration.   MUSCULOSKELETAL: Bilateral upper and lower extremity strength is normal and symmetric.  No atrophy or tone abnormalities are noted. Maximal tenderness over bilateral GTB and piriformis.   NEURO: Bilateral upper and lower extremity coordination and muscle stretch reflexes are physiologic and symmetric.  Negative Arriaga's. Plantar response are downgoing. No clonus noted. No loss of sensation is noted.  GAIT: Antalgic-ambulates with walker.        Assessment:       Encounter Diagnoses   Name Primary?    Piriformis syndrome of both sides Yes    Greater trochanteric bursitis of both hips     S/P lumbar spinal fusion              Plan:   We discussed with the patient the assessment and recommendations. The following is the plan  we agreed on:    Previous records and imaging reviewed.   She continues Oxycodone 10 mg Q4 hours and Hydromorphone 2 mg q12 hours PRN for post-operative pain.   Patient states robaxin does not help, switch to Tizanidine 4 mg TID PRN  Continue PT s/p surgery.  The patient will continue a home exercise routine to help with pain and strengthening.      Return to clinic when discharged from [facility].   Next follow-up with MD at 6 weeks s/p surgery.     The above plan and management options were discussed at length with patient. Patient is in agreement with the above and verbalized understanding.     Arlen Mendoza NP  6/27/2024

## 2024-06-28 RX ORDER — ROSUVASTATIN CALCIUM 20 MG/1
TABLET, COATED ORAL
Qty: 90 TABLET | Refills: 3 | Status: SHIPPED | OUTPATIENT
Start: 2024-06-28

## 2024-06-28 RX ORDER — PREDNISONE 5 MG/1
TABLET ORAL
Qty: 90 TABLET | Refills: 1 | Status: SHIPPED | OUTPATIENT
Start: 2024-06-28

## 2024-07-01 ENCOUNTER — PATIENT MESSAGE (OUTPATIENT)
Dept: RHEUMATOLOGY | Facility: CLINIC | Age: 64
End: 2024-07-01
Payer: MEDICARE

## 2024-07-02 ENCOUNTER — TELEPHONE (OUTPATIENT)
Dept: RHEUMATOLOGY | Facility: HOSPITAL | Age: 64
End: 2024-07-02
Payer: MEDICARE

## 2024-07-02 ENCOUNTER — TELEPHONE (OUTPATIENT)
Dept: FAMILY MEDICINE | Facility: CLINIC | Age: 64
End: 2024-07-02
Payer: MEDICARE

## 2024-07-02 DIAGNOSIS — E87.6 HYPOKALEMIA: Primary | ICD-10-CM

## 2024-07-02 NOTE — TELEPHONE ENCOUNTER
Called and spoke with pt in regards of message I got on her. Pt informed me that she was at [facility] getting an ultrasound done on her right leg cause they found a blood clot. Pt informed me that LU Perez was trying to reach Dr. Singleton in regards of pt needing an apt in 24-48 hours.    Pt was bale to get the correct number to LU Perez @ [phone] and I called her in regards of patient.  Spoke with LU Perez in regards of patient and to get a better understanding of what needed to be done and for. Ana informed me that pt is about 3 week post-op for back surgery for L2 to Pelvis fusion. Pt recently test positive for DVT in her right leg. Ana informed me that she can start pt on Eliquis, but she would like for Dr. Singleton to see pt on 24-48 hours as well. Ana informed me that she will write script for pt for 3 months. Informed her that I can make pt an apt for 1:00 pm to see Dr. Singleton.

## 2024-07-02 NOTE — TELEPHONE ENCOUNTER
----- Message from Tiff Zhang sent at 7/2/2024 11:35 AM CDT -----  Type:  Needs Medical Advice    Who Called: Caller PA for turo surgery   Would the patient rather a call back or a response via [facility]? Callback   Best Call Back Number: [phone] (PA for turo surgery Ana Davina)  Additional Information: Pt needs to been seen within 24/48 hrs. Pt has diagnosed dbt on ultrasound. Request a callback

## 2024-07-03 ENCOUNTER — PATIENT MESSAGE (OUTPATIENT)
Dept: RHEUMATOLOGY | Facility: CLINIC | Age: 64
End: 2024-07-03
Payer: MEDICARE

## 2024-07-03 ENCOUNTER — PATIENT MESSAGE (OUTPATIENT)
Dept: FAMILY MEDICINE | Facility: CLINIC | Age: 64
End: 2024-07-03

## 2024-07-03 ENCOUNTER — OFFICE VISIT (OUTPATIENT)
Dept: FAMILY MEDICINE | Facility: CLINIC | Age: 64
End: 2024-07-03
Payer: MEDICARE

## 2024-07-03 ENCOUNTER — PATIENT MESSAGE (OUTPATIENT)
Dept: PAIN MEDICINE | Facility: CLINIC | Age: 64
End: 2024-07-03
Payer: MEDICARE

## 2024-07-03 VITALS
OXYGEN SATURATION: 99 % | DIASTOLIC BLOOD PRESSURE: 80 MMHG | HEART RATE: 95 BPM | SYSTOLIC BLOOD PRESSURE: 132 MMHG | BODY MASS INDEX: 28.67 KG/M2 | HEIGHT: 61 IN | TEMPERATURE: 98 F | WEIGHT: 151.88 LBS

## 2024-07-03 DIAGNOSIS — M06.9 RHEUMATOID ARTHRITIS INVOLVING MULTIPLE SITES, UNSPECIFIED WHETHER RHEUMATOID FACTOR PRESENT: Chronic | ICD-10-CM

## 2024-07-03 DIAGNOSIS — I82.451 ACUTE EMBOLISM AND THROMBOSIS OF RIGHT PERONEAL VEIN: ICD-10-CM

## 2024-07-03 DIAGNOSIS — G57.03 PIRIFORMIS SYNDROME OF BOTH SIDES: ICD-10-CM

## 2024-07-03 DIAGNOSIS — E87.6 HYPOKALEMIA: ICD-10-CM

## 2024-07-03 DIAGNOSIS — G89.29 OTHER CHRONIC PAIN: ICD-10-CM

## 2024-07-03 DIAGNOSIS — I82.4Z1 DVT, LOWER EXTREMITY, DISTAL, ACUTE, RIGHT: Primary | ICD-10-CM

## 2024-07-03 PROCEDURE — 3008F BODY MASS INDEX DOCD: CPT | Mod: CPTII,S$GLB,, | Performed by: STUDENT IN AN ORGANIZED HEALTH CARE EDUCATION/TRAINING PROGRAM

## 2024-07-03 PROCEDURE — 99215 OFFICE O/P EST HI 40 MIN: CPT | Mod: S$GLB,,, | Performed by: STUDENT IN AN ORGANIZED HEALTH CARE EDUCATION/TRAINING PROGRAM

## 2024-07-03 PROCEDURE — 3079F DIAST BP 80-89 MM HG: CPT | Mod: CPTII,S$GLB,, | Performed by: STUDENT IN AN ORGANIZED HEALTH CARE EDUCATION/TRAINING PROGRAM

## 2024-07-03 PROCEDURE — 1159F MED LIST DOCD IN RCRD: CPT | Mod: CPTII,S$GLB,, | Performed by: STUDENT IN AN ORGANIZED HEALTH CARE EDUCATION/TRAINING PROGRAM

## 2024-07-03 PROCEDURE — 3075F SYST BP GE 130 - 139MM HG: CPT | Mod: CPTII,S$GLB,, | Performed by: STUDENT IN AN ORGANIZED HEALTH CARE EDUCATION/TRAINING PROGRAM

## 2024-07-03 PROCEDURE — 1160F RVW MEDS BY RX/DR IN RCRD: CPT | Mod: CPTII,S$GLB,, | Performed by: STUDENT IN AN ORGANIZED HEALTH CARE EDUCATION/TRAINING PROGRAM

## 2024-07-03 PROCEDURE — 99999 PR PBB SHADOW E&M-EST. PATIENT-LVL V: CPT | Mod: PBBFAC,,, | Performed by: STUDENT IN AN ORGANIZED HEALTH CARE EDUCATION/TRAINING PROGRAM

## 2024-07-03 RX ORDER — METHYLPREDNISOLONE 4 MG/1
4 TABLET ORAL
COMMUNITY
Start: 2024-06-26 | End: 2025-06-26

## 2024-07-03 RX ORDER — TIZANIDINE 4 MG/1
4 TABLET ORAL 4 TIMES DAILY PRN
Qty: 120 TABLET | Refills: 0 | Status: SHIPPED | OUTPATIENT
Start: 2024-07-03 | End: 2024-07-03 | Stop reason: DRUGHIGH

## 2024-07-03 RX ORDER — POTASSIUM CHLORIDE 20 MEQ/1
40 TABLET, EXTENDED RELEASE ORAL 2 TIMES DAILY
Qty: 120 TABLET | Refills: 2 | Status: SHIPPED | OUTPATIENT
Start: 2024-07-03

## 2024-07-03 RX ORDER — LIDOCAINE 50 MG/G
1 PATCH TOPICAL DAILY
Qty: 30 PATCH | Refills: 1 | Status: SHIPPED | OUTPATIENT
Start: 2024-07-03

## 2024-07-03 RX ORDER — DOCUSATE SODIUM 50 MG AND SENNOSIDES 8.6 MG 8.6; 5 MG/1; MG/1
1 TABLET, FILM COATED ORAL
COMMUNITY
Start: 2024-06-18

## 2024-07-03 NOTE — PROGRESS NOTES
Subjective:      Patient ID: Joyce Peterson is a 63 y.o. female.    Chief Complaint: Follow-up (Pt here for a post-op follow up from back surgery for 3 weeks ago, pt also has DVT of her right leg as well. )    - DVT right leg  - severe pain knees and upper leg   - has restarted NSAIDs and increased steroids back to 10mg  - pain is nearly unbearable   - she does not know what to do   - she has talked with pain management and rheum and they have made few changes  - of note potassium a little low will increase oral potassium dose   - can try lidocaine patches      Follow-up  Associated symptoms include arthralgias and myalgias.     Review of Systems   Musculoskeletal:  Positive for arthralgias, back pain, gait problem and myalgias.   All other systems reviewed and are negative.       Objective:     Vitals:    24 1304   BP: 132/80   Pulse: 95   Temp: 97.9 °F (36.6 °C)      Physical Exam  Vitals reviewed.   Constitutional:       Appearance: Normal appearance. She is normal weight.   HENT:      Head: Normocephalic and atraumatic.   Eyes:      Conjunctiva/sclera: Conjunctivae normal.   Cardiovascular:      Rate and Rhythm: Normal rate and regular rhythm.      Heart sounds: Normal heart sounds.   Pulmonary:      Effort: Pulmonary effort is normal.      Breath sounds: Normal breath sounds.   Abdominal:      Palpations: Abdomen is soft.      Tenderness: There is no abdominal tenderness.   Musculoskeletal:         General: Normal range of motion.      Cervical back: Normal range of motion.      Right lower leg: 3+ Edema present.      Left lower le+ Edema present.   Neurological:      General: No focal deficit present.      Mental Status: She is alert and oriented to person, place, and time.   Psychiatric:         Mood and Affect: Mood normal.         Behavior: Behavior normal.        Assessment:         1. DVT, lower extremity, distal, acute, right    2. Acute embolism and thrombosis of right peroneal vein    3.  Other chronic pain    4. Rheumatoid arthritis involving multiple sites, unspecified whether rheumatoid factor present    5. Hypokalemia          Plan:   1. DVT, lower extremity, distal, acute, right  - US Lower Extremity Veins Bilateral; Future    2. Acute embolism and thrombosis of right peroneal vein  - US Lower Extremity Veins Bilateral; Future    3. Other chronic pain  - LIDOcaine (LIDODERM) 5 %; Place 1 patch onto the skin once daily. Remove & Discard patch within 12 hours or as directed by MD  Dispense: 30 patch; Refill: 1    4. Rheumatoid arthritis involving multiple sites, unspecified whether rheumatoid factor present  - LIDOcaine (LIDODERM) 5 %; Place 1 patch onto the skin once daily. Remove & Discard patch within 12 hours or as directed by MD  Dispense: 30 patch; Refill: 1    5. Hypokalemia  - potassium chloride SA (K-DUR,KLOR-CON) 20 MEQ tablet; Take 2 tablets (40 mEq total) by mouth 2 (two) times daily.  Dispense: 120 tablet; Refill: 2     Increase oral potassium; take 40mg (2 tabs) BID for 1 week then decrease to 20mg (1 tab) BID    Try lidocaine patches for pain  Repeat US LE in 3 months         [facility]   7/3/24     I spent a total of 41 minutes on the day of the visit.This includes face to face time and non-face to face time preparing to see the patient (eg, review of tests), obtaining and/or reviewing separately obtained history, documenting clinical information in the electronic or other health record, independently interpreting results and communicating results to the patient/family/caregiver, or care coordinator.

## 2024-07-06 ENCOUNTER — PATIENT MESSAGE (OUTPATIENT)
Dept: FAMILY MEDICINE | Facility: CLINIC | Age: 64
End: 2024-07-06
Payer: MEDICARE

## 2024-07-09 ENCOUNTER — PATIENT MESSAGE (OUTPATIENT)
Dept: FAMILY MEDICINE | Facility: CLINIC | Age: 64
End: 2024-07-09
Payer: MEDICARE

## 2024-07-09 ENCOUNTER — TELEPHONE (OUTPATIENT)
Dept: PAIN MEDICINE | Facility: CLINIC | Age: 64
End: 2024-07-09
Payer: MEDICARE

## 2024-07-09 NOTE — TELEPHONE ENCOUNTER
----- Message from Darya Feliciano sent at 7/9/2024  1:41 PM CDT -----   Name of Who is Calling:     What is the request in detail:  request call back in reference to verify if patient is scheduled and start to manage patient pain Please contact to further discuss and advise      Can the clinic reply by [facility]:     What Number to Call Back if not in [facility]: dr Schuster neurology  navigator / [place] / [phone]  
Staff could not reach the Dr's OFFICE.  
MED

## 2024-07-15 ENCOUNTER — OFFICE VISIT (OUTPATIENT)
Dept: RHEUMATOLOGY | Facility: CLINIC | Age: 64
End: 2024-07-15
Payer: MEDICARE

## 2024-07-15 ENCOUNTER — PATIENT MESSAGE (OUTPATIENT)
Dept: FAMILY MEDICINE | Facility: CLINIC | Age: 64
End: 2024-07-15
Payer: MEDICARE

## 2024-07-15 DIAGNOSIS — M06.9 RHEUMATOID ARTHRITIS: ICD-10-CM

## 2024-07-15 DIAGNOSIS — M06.9 RHEUMATOID ARTHRITIS, INVOLVING UNSPECIFIED SITE, UNSPECIFIED WHETHER RHEUMATOID FACTOR PRESENT: ICD-10-CM

## 2024-07-15 PROCEDURE — 99213 OFFICE O/P EST LOW 20 MIN: CPT | Mod: 95,,, | Performed by: INTERNAL MEDICINE

## 2024-07-15 RX ORDER — LEFLUNOMIDE 20 MG/1
20 TABLET ORAL DAILY
Qty: 90 TABLET | Refills: 0 | Status: SHIPPED | OUTPATIENT
Start: 2024-07-15

## 2024-07-15 RX ORDER — SARILUMAB 200 MG/1.14ML
200 INJECTION, SOLUTION SUBCUTANEOUS
Qty: 2.28 ML | Refills: 1 | Status: ACTIVE | OUTPATIENT
Start: 2024-07-15

## 2024-07-15 RX ORDER — PREDNISONE 5 MG/1
10 TABLET ORAL DAILY
Qty: 100 TABLET | Refills: 1 | Status: SHIPPED | OUTPATIENT
Start: 2024-07-15

## 2024-07-15 NOTE — PROGRESS NOTES
Subjective:      Patient ID: Joyce Peterson is a 63 y.o. female.    Chief Complaint: No chief complaint on file.    HPI   The patient location is: home  The chief complaint leading to consultation is: RA; osteoporosis    Visit type: audiovisual    Face to Face time with patient: 20  25 minutes of total time spent on the encounter, which includes face to face time and non-face to face time preparing to see the patient (eg, review of tests), Obtaining and/or reviewing separately obtained history, Documenting clinical information in the electronic or other health record, Independently interpreting results (not separately reported) and communicating results to the patient/family/caregiver, or Care coordination (not separately reported).         Each patient to whom he or she provides medical services by telemedicine is:  (1) informed of the relationship between the physician and patient and the respective role of any other health care provider with respect to management of the patient; and (2) notified that he or she may decline to receive medical services by telemedicine and may withdraw from such care at any time.    S/P l2-PELVIS fusion L2-pelvis 6/12/24 Dr. Landen ROWLEY    Back pain is improved  The lumbar radicular symptoms are better  The left leg sometimes gabriela with walking  Has f.u with NP post op        Review of Systems   Constitutional:  Negative for appetite change, fatigue, fever and unexpected weight change.   HENT:  Negative for mouth sores and trouble swallowing.    Eyes:  Negative for redness and visual disturbance.   Respiratory:  Negative for cough, shortness of breath and wheezing.    Cardiovascular:  Negative for chest pain and palpitations.   Gastrointestinal:  Negative for abdominal pain, anal bleeding, blood in stool, constipation, diarrhea, nausea and vomiting.   Genitourinary:  Negative for dysuria, frequency, genital sores and urgency.   Musculoskeletal:  Negative for arthralgias, back  pain, gait problem, joint swelling, myalgias, neck pain and neck stiffness.   Skin:  Negative for rash.   Neurological:  Negative for weakness, numbness and headaches.   Hematological:  Negative for adenopathy. Does not bruise/bleed easily.   Psychiatric/Behavioral:  Negative for sleep disturbance. The patient is not nervous/anxious.         Objective:   LMP  (LMP Unknown)   Physical Exam      6/23/2022 10/6/2022 9/6/2023 4/10/2024   Tender (GURROLA-28) 1 / 28 1 / 28 2 / 28 0 / 28    Swollen (GURROLA-28) 0 / 28 0 / 28 1 / 28 0 / 28    Provider Global 10 mm 10 mm 10 mm 5 mm   Patient Global 90 mm 75 mm 50 mm --   ESR 1 mm/hr 2 mm/hr 8 mm/hr 2 mm/hr   CRP 0.3 mg/L 0.3 mg/L 0.3 mg/L --   GURROLA-28 (ESR) 1.82 (Remission) 2.1 (Remission) 3.23 (Moderate disease activity) --   GURROLA-28 (CRP) 2.87 (Low disease activity) 2.66 (Low disease activity) 2.83 (Low disease activity) --   CDAI Score 11  9.5  9  --         Latest Reference Range & Units 07/02/24 10:19 07/02/24 11:01 07/02/24 11:23   WBC 3.90 - 12.70 K/uL 3.77 (L)     RBC 4.00 - 5.40 M/uL 3.51 (L)     Hemoglobin 12.0 - 16.0 g/dL 10.3 (L)     Hematocrit 37.0 - 48.5 % 32.0 (L)     MCV 82 - 98 fL 91     MCH 27.0 - 31.0 pg 29.3     MCHC 32.0 - 36.0 g/dL 32.2     RDW 11.5 - 14.5 % 14.8 (H)     Platelet Count 150 - 450 K/uL 378     MPV 9.2 - 12.9 fL 9.9     Gran % 38.0 - 73.0 % 61.5     Lymph % 18.0 - 48.0 % 19.9     Mono % 4.0 - 15.0 % 17.5 (H)     Eos % 0.0 - 8.0 % 0.3     Basophil % 0.0 - 1.9 % 0.8     Immature Granulocytes 0.0 - 0.5 % 0.3     Gran # (ANC) 1.8 - 7.7 K/uL 2.3     Lymph # 1.0 - 4.8 K/uL 0.8 (L)     Mono # 0.3 - 1.0 K/uL 0.7     Eos # 0.0 - 0.5 K/uL 0.0     Baso # 0.00 - 0.20 K/uL 0.03     Immature Grans (Abs) 0.00 - 0.04 K/uL 0.01     nRBC 0 /100 WBC 0     Differential Method  Automated     Ferritin 20.0 - 300.0 ng/mL 88     Sed Rate 0 - 20 mm/Hr 2     Sodium 136 - 145 mmol/L 141     Potassium 3.5 - 5.1 mmol/L 3.3 (L)     Chloride 95 - 110 mmol/L 105     CO2 23 -  29 mmol/L 25     Anion Gap 8 - 16 mmol/L 11     BUN 8 - 23 mg/dL 15     Creatinine 0.5 - 1.4 mg/dL 0.6     eGFR >60 mL/min/1.73 m^2 >60.0     Glucose 70 - 110 mg/dL 126 (H)     Calcium 8.7 - 10.5 mg/dL 9.3     ALP 55 - 135 U/L 176 (H)     PROTEIN TOTAL 6.0 - 8.4 g/dL 5.8 (L)     Albumin 3.5 - 5.2 g/dL 3.4 (L)     BILIRUBIN TOTAL 0.1 - 1.0 mg/dL 0.4     AST 10 - 40 U/L 18     ALT 10 - 44 U/L 18     CRP 0.0 - 8.2 mg/L <0.3     Vitamin D 30 - 96 ng/mL 34     TSH 0.400 - 4.000 uIU/mL 1.090     XR LUMBAR SPINE 2 OR 3 VIEWS   Rpt (E)    VAS US DOPPLER VENOUS LEGS BILATERAL    Rpt (E)   (L): Data is abnormally low  (H): Data is abnormally high  (E): External lab result  Rpt: View report in Results Review for more information     Latest Reference Range & Units 07/02/24 10:19   TSH 0.400 - 4.000 uIU/mL 1.090       EXAMINATION:  XR HIP WITH PELVIS WHEN PERFORMED, 2 OR 3 VIEWS LEFT     CLINICAL HISTORY:  Pain in left hip     TECHNIQUE:  AP view of the pelvis and frog leg lateral view of the left hip were performed.     COMPARISON:  Prior radiographs     FINDINGS:  Left femoral hardware is unchanged.  Linear cortical defect within the proximal lateral left femur is unchanged from 04/10/2023.     Lower lumbar spine surgical fusion and degenerative findings noted.  Bilateral SI joint degenerative findings.  Right hip unchanged with similar degenerative findings.     Impression:     As above        Electronically signed by:Donovan Santos MD  Date:                                            10/09/2023  Time:                                           10:24    ASSESSMENT:    RA has been in remission  Major issue at present is recurrent left lumbar radiculopathy and now intermittent right lumbar radiculopathy. Has been told will need repeat lumbar surgery after 6 wks of antibiotics for ONJ/osteomyelitis right lower molar/maxillary area     Other main issue is right lower molar ONJ, bone biopsy 3/14/24 with cultures positive for  oxacillin resistant Staph. Ep, Streptococcus ESBL E. Coli  Dr. Deirdre Bernstein plans 6 wk course of daptomycin 8mg/kg IV daily and ertapenem 1g IV daily  via PICC line        Dr. Lane ordered another course of teriparatide in Jan but pt just recently started.       erosive RA discontinued long term methotrexate b/o nausea with po and sc and reduced dose  , allergic to sulfa,   and had reaction to hydroxychloroquine;  Has failed 3 TNFi(infliximab, etanercept, adalimumab),   Abatacept ineffective   ituximab hypogammaglobulinemia and frequent infections in any event, acute vertigo/dizziness with tocilizumab x2  Failed tofacitinib  Tocilizumab , acute severe vertigo  Currently on Sarilumab, leflunomide     Future option would be upadacitinib            RA  TJ 0  SJ 1  Pt global  85  ESR 2 CRP <0.3 DAS28 2.07  LFE72-FDW  2.61(LDA)  CDAI 11.5(MDA) driven by OA hips and knees post  lumbar surgery     Rheumatoid Arthritis Treatment Goals:  -Disease Activity Measure:CDAI   -Target: LDA  -Patient Goal:  -Therapy/Change: none, activity scores driven by back symptoms, neck symptoms, fibromyalgia , right shoulder, OA right knee, not RA. No need to change sarilumab to upadacitinib  -Shared Decision Making: Discussed goals of care and therapy plan together with patient as outlined above.      Osteoporosis DXA 12/20/23 : normal BMD but  with FRAX : hip 1.8% major 25%  : completed 2 years of teriparatide added to denosumab, then  denosumab alone but ONJ and denosumab stopped(last dose 12/8/23) teriparatide resumed per Dr. Lane  ONJ while on denosumab  S/p L4-5 interbody fusion as above 6/22/23 with recurrent left lumbar and now right lumbar radiculopathy, with planned revision surgery   Subclinical hyperthyroidism due for TSH  Thyroid nodule  Fibromyalgia;   OA knees  Right hip pain with small effusion, mild degenerative changes, anterior-superior labral tear, partial tears gluteus minimus and medius tendons, greater  trochanteric bursiti, subtotal tear proximal hamstring on MRI 4/25/23  Right shoulder pain with rotator cuff tendinitis ?tear  OA with Right knee effusion  Lipids  LDL 79.2  4/8/24 on rosuvastatin 20mg nightly    Plan:   K+ recheck tomorrow    F/u Dr. Díaz  for right hip and OA knees   *Covid vaccine in fall  *Fluad in fall  *Prevnar 20  *RSV vaccine  *Tdap   Cont home PT quad strengthening, walker isometrics  Cont recently resumed  Kevzara 200mg sc q 14 days  Continue leflunomide 20mg daily  Increase prednisone 10mg daily x 1 wk then 7.5 mg daily x 1 wk then  5 mg daily  Continue teriparatide as resumed by Dr. Lane Jan 2024 in place of denosumab given denosumab related ONJ  Continue 1200mg dietary calcium daily   RTC 3 months with standing labs

## 2024-07-15 NOTE — PATIENT INSTRUCTIONS
K+ recheck tomorrow   F/u Dr. Díaz  *Covid vaccine in fall  *Fluad in fall  *Prevnar 20  *RSV vaccine  *Tdap   Cont home PT quad strengthening, walker isometrics  Resumed Kevzara 200mg sc q 14 days  Continue leflunomide 20mg daily  Increase prednisone 10mg daily x 1 wk then 7.5 mg daily x 1 wk then  5 mg daily  Continue teriparatide as resumed by Dr. Lane Jan 2024 in place of denosumab given denosumab related ONJ  Continue 1200mg dietary calcium daily   RTC 3 months with standing labs

## 2024-07-17 ENCOUNTER — OFFICE VISIT (OUTPATIENT)
Dept: PAIN MEDICINE | Facility: CLINIC | Age: 64
End: 2024-07-17
Attending: ANESTHESIOLOGY
Payer: MEDICARE

## 2024-07-17 ENCOUNTER — LAB VISIT (OUTPATIENT)
Dept: LAB | Facility: OTHER | Age: 64
End: 2024-07-17
Attending: INTERNAL MEDICINE
Payer: MEDICARE

## 2024-07-17 VITALS
BODY MASS INDEX: 25.12 KG/M2 | HEART RATE: 99 BPM | WEIGHT: 132.94 LBS | TEMPERATURE: 98 F | DIASTOLIC BLOOD PRESSURE: 74 MMHG | SYSTOLIC BLOOD PRESSURE: 133 MMHG

## 2024-07-17 DIAGNOSIS — D50.0 IRON DEFICIENCY ANEMIA DUE TO CHRONIC BLOOD LOSS: ICD-10-CM

## 2024-07-17 DIAGNOSIS — J30.89 PERENNIAL ALLERGIC RHINITIS: ICD-10-CM

## 2024-07-17 DIAGNOSIS — G89.4 CHRONIC PAIN SYNDROME: ICD-10-CM

## 2024-07-17 DIAGNOSIS — M96.1 POSTLAMINECTOMY SYNDROME OF LUMBAR REGION: ICD-10-CM

## 2024-07-17 DIAGNOSIS — E87.6 HYPOKALEMIA: ICD-10-CM

## 2024-07-17 DIAGNOSIS — M47.26 OSTEOARTHRITIS OF SPINE WITH RADICULOPATHY, LUMBAR REGION: Primary | ICD-10-CM

## 2024-07-17 LAB
BASOPHILS # BLD AUTO: 0.02 K/UL (ref 0–0.2)
BASOPHILS NFR BLD: 0.5 % (ref 0–1.9)
DIFFERENTIAL METHOD BLD: ABNORMAL
EOSINOPHIL # BLD AUTO: 0 K/UL (ref 0–0.5)
EOSINOPHIL NFR BLD: 0.2 % (ref 0–8)
ERYTHROCYTE [DISTWIDTH] IN BLOOD BY AUTOMATED COUNT: 14.4 % (ref 11.5–14.5)
FERRITIN SERPL-MCNC: 64 NG/ML (ref 20–300)
HCT VFR BLD AUTO: 33.6 % (ref 37–48.5)
HGB BLD-MCNC: 10.7 G/DL (ref 12–16)
IMM GRANULOCYTES # BLD AUTO: 0.01 K/UL (ref 0–0.04)
IMM GRANULOCYTES NFR BLD AUTO: 0.2 % (ref 0–0.5)
LYMPHOCYTES # BLD AUTO: 1 K/UL (ref 1–4.8)
LYMPHOCYTES NFR BLD: 23 % (ref 18–48)
MCH RBC QN AUTO: 29.5 PG (ref 27–31)
MCHC RBC AUTO-ENTMCNC: 31.8 G/DL (ref 32–36)
MCV RBC AUTO: 93 FL (ref 82–98)
MONOCYTES # BLD AUTO: 0.7 K/UL (ref 0.3–1)
MONOCYTES NFR BLD: 16.6 % (ref 4–15)
NEUTROPHILS # BLD AUTO: 2.6 K/UL (ref 1.8–7.7)
NEUTROPHILS NFR BLD: 59.5 % (ref 38–73)
NRBC BLD-RTO: 0 /100 WBC
PLATELET # BLD AUTO: 253 K/UL (ref 150–450)
PMV BLD AUTO: 10.9 FL (ref 9.2–12.9)
POTASSIUM SERPL-SCNC: 4.1 MMOL/L (ref 3.5–5.1)
RBC # BLD AUTO: 3.63 M/UL (ref 4–5.4)
WBC # BLD AUTO: 4.34 K/UL (ref 3.9–12.7)

## 2024-07-17 PROCEDURE — 99999 PR PBB SHADOW E&M-EST. PATIENT-LVL V: CPT | Mod: PBBFAC,,, | Performed by: ANESTHESIOLOGY

## 2024-07-17 PROCEDURE — 85025 COMPLETE CBC W/AUTO DIFF WBC: CPT | Performed by: INTERNAL MEDICINE

## 2024-07-17 PROCEDURE — 3008F BODY MASS INDEX DOCD: CPT | Mod: CPTII,S$GLB,, | Performed by: ANESTHESIOLOGY

## 2024-07-17 PROCEDURE — 84132 ASSAY OF SERUM POTASSIUM: CPT | Performed by: INTERNAL MEDICINE

## 2024-07-17 PROCEDURE — 1160F RVW MEDS BY RX/DR IN RCRD: CPT | Mod: CPTII,S$GLB,, | Performed by: ANESTHESIOLOGY

## 2024-07-17 PROCEDURE — 99214 OFFICE O/P EST MOD 30 MIN: CPT | Mod: GC,S$GLB,, | Performed by: ANESTHESIOLOGY

## 2024-07-17 PROCEDURE — 36415 COLL VENOUS BLD VENIPUNCTURE: CPT | Performed by: INTERNAL MEDICINE

## 2024-07-17 PROCEDURE — 82728 ASSAY OF FERRITIN: CPT | Performed by: INTERNAL MEDICINE

## 2024-07-17 PROCEDURE — 3075F SYST BP GE 130 - 139MM HG: CPT | Mod: CPTII,S$GLB,, | Performed by: ANESTHESIOLOGY

## 2024-07-17 PROCEDURE — 1159F MED LIST DOCD IN RCRD: CPT | Mod: CPTII,S$GLB,, | Performed by: ANESTHESIOLOGY

## 2024-07-17 PROCEDURE — 3078F DIAST BP <80 MM HG: CPT | Mod: CPTII,S$GLB,, | Performed by: ANESTHESIOLOGY

## 2024-07-17 RX ORDER — MORPHINE SULFATE 30 MG/1
30 TABLET, FILM COATED, EXTENDED RELEASE ORAL EVERY 12 HOURS
Qty: 60 TABLET | Refills: 0 | Status: SHIPPED | OUTPATIENT
Start: 2024-07-17 | End: 2024-07-17 | Stop reason: SDUPTHER

## 2024-07-17 RX ORDER — MORPHINE SULFATE 30 MG/1
30 TABLET, FILM COATED, EXTENDED RELEASE ORAL EVERY 12 HOURS
Qty: 60 TABLET | Refills: 0 | Status: SHIPPED | OUTPATIENT
Start: 2024-07-17

## 2024-07-17 RX ORDER — MONTELUKAST SODIUM 10 MG/1
10 TABLET ORAL NIGHTLY
Qty: 90 TABLET | Refills: 3 | Status: SHIPPED | OUTPATIENT
Start: 2024-07-17

## 2024-07-17 NOTE — PROGRESS NOTES
Pain Follow up Note- Established Patient         Subjective:      Patient ID: Joyce Peterson is a 63 y.o. female.    Chief Complaint: Low-back Pain    Referred by: No ref. provider found       Back Pain    Hip Pain       Original HPI  She is here for follow-up for left hip pain.  She said the radiofrequency ablation she had in July of 2022 helped her tremendously.  Three months ago her pain started again on the lateral aspect of the left hip.  She is status post ORIF on the left hip.  She has heterotopic ossification.  She would like to repeat the procedure.  She said she will have to put off the spinal cord stimulator until an MRI is executed ordered by another provider.  No new symptomatology otherwise.    Interval History 7/17/2024:   Joyce Peterson with a history of RA and lumbar radiculopathy s/p L2-pelvis fusion, L5-S1 TLIF comes to the clinic for opioid management for her for chronic lower back, B/L hips and B/L knee pain. Patient's current opioid management is done by [facility] but they will not prescirbe Oxycodone 10 Q4 hours beyond 6 weeks and recommended follow up with pain medicine. Patient said she gets good pain relief with the Oxycodone 10 Q4 hours. Pain is worse at nights, prolonged walking and sitting. She is also using Voltaren gel, Lyrica and Zanaflex. Will use tylenol PRN for break through pain. The pain is mostly in her hips and knees bilaterally.  Her lower back pain with radiation down her legs have improved after the surgery.  Patient said she got good pain relief and restoration of function with PT but her days were reduced from 3 days to 2 days. She has follow up with [facility] and Rheumatology.     Interval History 6/27/2024:  Joyce Peterson returns to clinic for follow-up for chronic back pain. She is s/p L2-pelvis fusion on 6/12/2024. She has noticed increased pain over the last 3 nights. She had a visit with [facility] on 6/25/2024 and was told this was normal healing and nerve regeneration  from the surgery. The patient said [facility] will not prescribe her pain longer than 6 weeks s/p surgery.  Her current regimen from [facility] is oxycodone 10 mg q4 hours, hydromorphone 1 mg for breakthrough pain.  She also takes Lyrica 150 mg TID and Robaxin 750 mg QID.  She is not supposed to take Naproxen s/p surgery, but has taken it a couple times for severe pain.  The patient denies fever/night sweats, urinary incontinence, bowel incontinence, significant weight changes, significant motor weakness or changes, or loss of sensations. Her pain today is 6/10.    Interval History 4/17/2024:   Patient returns to clinic for follow up of low back and hip pain. She was scheduled for SCS trial however trial was not completed due to patient preference. She states she has been seen by neurosugery and orthopedic surgery, for which she plans to undergo back surgery. 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  She reports pain has been worsening  since last visit. She states she continues to take lyrica TID, robaxin 750mg QID, oxycodone 5 q.i.d. p.r.n.  for pain control, she states this has not provided her with relief x the last 3 weeks. She reports she has not been able to participate in HEP due to pain. Pain level at time of exam is reported to be 8.5/10. Patient requests she wants to return to a previous regimen of percocet and dilaudid until she is able to have back procedure. She expresses excruciating pain that runs down bilateral pain with any movement and with coughing.     Interval history  She returns for virtual follow up of back and hip pain. At her last OV, Dr. Mayfield recommended [place] SCS trial. She had her first part of the psych consult and has the second part this afternoon. She has had limited benefit with multiple interventions in the past including back surgery, procedures, PT and medications. She would like to move  forward with SCS trial. Her pain today is 10/10.    Interval History 1/16/24:  Joyce Peterson returns for virtual follow up of low back and left hip pain. She is s/p L4/5 TLIF 6/22/23. She is planning for a left hip RFA and SCS trial with us. 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 She has since had an MRI of her L Spine in December that demonstrated a large amount of extruded disc material extending superiorly from L4-5 into the spinal canal with additional grade 2 anterolisthesis at this level contributing to severe spinal canal stenosis. She reports intermittent weakness in her left > right leg. Patient denies saddle anesthesia, bladder/bowel incontinence, ataxia, or increased falls. Shopping cart sign +. She is able to walk 1 block before needing to stop. Pain improves upon sitting. Pain currently ranges from 5-9/10, currently a 7/10. Pain radiates down her both her legs anterolaterally down to the ankle with additional shin pain. She has been on percocet 5-325 QID PRN and lyrica 150mg TID. She received opioids from Ortho 5/325 in October addition to our Rx. Ortho saw patient today and is recommending an L2-Pelvis    Pertinent Surgeries:  6/12/2024 - L2-Pelvis Fusion ([place])    Pain Medication   30 mg Morphine extended release (started on 7/17/2024)  Pregabalin 150 mg   Oxycodone 10 mg Q 4 hours   Percocet 5 mg (discontinued)  Voltaren Gel   Tylenol     Past Medical History:   Diagnosis Date    Acid reflux     Allergy     Anemia     Asthma     Coronary artery disease     CS (cervical spondylosis) 03/08/2013    Degenerative disc disease     Degenerative joint disease of hindfoot, left 6/28/2022    Dry eyes     Dry mouth     Gastroparesis     History of methotrexate therapy 01/19/2022    Hyperlipidemia     Lateral meniscus derangement 04/06/2016    Lobular carcinoma in situ     Lumbar spondylosis 03/08/2013    Osteoarthritis     Osteoporosis     Pes planovalgus, acquired, left  6/28/2022    Rheumatoid arthritis(714.0)     Rupture of left triceps tendon 10/17/2018    Umbilical hernia 08/13/2015     Past Surgical History:   Procedure Laterality Date    BACK SURGERY  06/12/2024    revision of prior back surgery on 07/14/2023    BREAST BIOPSY Left 01/29/2002    core bx    CARPAL TUNNEL RELEASE Right 05/2017    CHOLECYSTECTOMY  2004    COLONOSCOPY      10/11    COLONOSCOPY N/A 06/29/2017    Procedure: COLONOSCOPY;  Surgeon: López Moore MD;  Location: [facility] ENDO (4TH FLR);  Service: Endoscopy;  Laterality: N/A;    EPIDURAL STEROID INJECTION N/A 11/18/2021    Procedure: INJECTION, STEROID, EPIDURAL, L5-S1 IL NEED CONSENT;  Surgeon: Tj Mayfield MD;  Location: [facility]GT;  Service: Pain Management;  Laterality: N/A;    EPIDURAL STEROID INJECTION N/A 09/29/2022    Procedure: LUMBAR L3/L4 IL MADELINE CONTRAST;  Surgeon: Tj Mayfield MD;  Location: [facility]GT;  Service: Pain Management;  Laterality: N/A;    EPIDURAL STEROID INJECTION N/A 12/12/2022    Procedure: INJECTION, STEROID, EPIDURAL L5/S1 IL CONTRAST;  Surgeon: Tj Mayfield MD;  Location: [facility]IN [facility]GT;  Service: Pain Management;  Laterality: N/A;    EPIDURAL STEROID INJECTION N/A 04/06/2023    Procedure: INJECTION, STEROID, EPIDURAL L5/S1;  Surgeon: Tj Mayfield MD;  Location: [facility];  Service: Pain Management;  Laterality: N/A;    FOOT ARTHRODESIS Left 01/11/2023    Procedure: FUSION, FOOT;  Surgeon: Ariella Finnegan MD;  Location: [facility] OR;  Service: Orthopedics;  Laterality: Left;  nimbus    FUSION, SPINE, LUMBAR, TLIF, POSTERIOR APPROACH, USING PEDICLE SCREW N/A 06/22/2023    Procedure: FUSION, SPINE, LUMBAR, TLIF, POSTERIOR APPROACH, USING PEDICLE SCREW L4/5 spinewave SNS:SSEP/EMG;  Surgeon: Jh Mosqueda MD;  Location: [facility] OR 2ND FLR;  Service: Neurosurgery;  Laterality: N/A;    HARVESTING OF BONE GRAFT Left 01/11/2023    Procedure: SURGICAL PROCUREMENT, BONE GRAFT;  Surgeon: Ariella Finnegan MD;  Location: [facility] OR;  Service:  Orthopedics;  Laterality: Left;    INJECTION OF ANESTHETIC AGENT AROUND NERVE Bilateral 08/23/2021    Procedure: BLOCK, NERVE, MEDIAL BRANCH L3,L4,L5;  Surgeon: Tj Mayfield MD;  Location: [facility] PAIN MGT;  Service: Pain Management;  Laterality: Bilateral;  1 of 2    INJECTION OF ANESTHETIC AGENT AROUND NERVE Bilateral 08/26/2021    Procedure: BLOCK, NERVE, MEDIAL BRANCH L3,L4,L5;  Surgeon: Tj Mayfield MD;  Location: [facility];  Service: Pain Management;  Laterality: Bilateral;  2 of 2    INJECTION OF ANESTHETIC AGENT AROUND NERVE Left 07/07/2022    Procedure: BLOCK, NERVE, LEFT OBTURATOR AND FEMORAL;  Surgeon: Tj Mayfield MD;  Location: [facility];  Service: Pain Management;  Laterality: Left;    INJECTION OF FACET JOINT Bilateral 03/12/2020    Procedure: FACET JOINT INJECTION (LUMBAR BLOCK) BILATERAL L4-5 AND L5-S1 DIRECT REFERRAL;  Surgeon: Tj Mayfield MD;  Location: [facility] PAIN MGT;  Service: Pain Management;  Laterality: Bilateral;  NEEDS CONSENT    INJECTION OF FACET JOINT Bilateral 03/29/2021    Procedure: INJECTION, FACET JOINT L3/4, L4/5, L5/S1;  Surgeon: Tj Mayfield MD;  Location: [facility] PAIN MGT;  Service: Pain Management;  Laterality: Bilateral;    INJECTION OF JOINT Right 07/30/2020    Procedure: INJECTION, JOINT, RIGHT HIP Interarticular under flouro;  Surgeon: Tj Mayfield MD;  Location: [facility] PAIN MGT;  Service: Pain Management;  Laterality: Right;  INJECTION, JOINT, RIGHT HIP Interarticular under flouro    INJECTION OF JOINT Right 02/21/2022    Procedure: Injection, Joint RIGHT ISCHIAL BURSA;  Surgeon: Tj Mayfield MD;  Location: [facility] PAIN MGT;  Service: Pain Management;  Laterality: Right;    INTRAMEDULLARY RODDING OF FEMUR Left 05/21/2019    Procedure: INSERTION, INTRAMEDULLARY ARIEL, FEMUR;  Surgeon: López Duff MD;  Location: [facility] OR [address] [place] [address];  Service: Orthopedics;  Laterality: Left;    LENGTHENING OF TENDON OF LOWER EXTREMITY Left 01/11/2023    Procedure: LENGTHENING, TENDON, LOWER  EXTREMITY;  Surgeon: Ariella Finnegan MD;  Location: [facility] OR;  Service: Orthopedics;  Laterality: Left;    MAGNETIC RESONANCE IMAGING N/A 05/29/2023    Procedure: MRI (Magnetic Resonance Imagine);  Surgeon: Sujey Surgeon;  Location: [facility] SUJEY;  Service: Anesthesiology;  Laterality: N/A;    MAGNETIC RESONANCE IMAGING N/A 01/10/2024    Procedure: MRI (Magnetic Resonance Imagine);  Surgeon: Sujey Robin;  Location: [facility];  Service: Anesthesiology;  Laterality: N/A;    RADIOFREQUENCY ABLATION Left 09/20/2021    Procedure: RADIOFREQUENCY ABLATION left L3,4,5 RFA  1 of 2  CONSENT NEEDED;  Surgeon: Tj Mayfield MD;  Location: [facility];  Service: Pain Management;  Laterality: Left;    RADIOFREQUENCY ABLATION Right 10/04/2021    Procedure: RADIOFREQUENCY ABLATION  right L3,4,5 RFA   2 of 2  CONSENT NEEDED;  Surgeon: Tj Mayfield MD;  Location: [facility]IN [facility]GT;  Service: Pain Management;  Laterality: Right;    RADIOFREQUENCY ABLATION Left 04/21/2022    Procedure: Radiofrequency Ablation LEFT L3,L4,L5;  Surgeon: Tj Mayfield MD;  Location: [facility] PAIN [facility]GT;  Service: Pain Management;  Laterality: Left;    RADIOFREQUENCY ABLATION Right 05/12/2022    Procedure: Radiofrequency Ablation RIGHT L3,L4,L5;  Surgeon: Tj Mayfield MD;  Location: [facility] PAIN MGT;  Service: Pain Management;  Laterality: Right;    RADIOFREQUENCY ABLATION Left 07/25/2022    Procedure: Radiofrequency Ablation Left Femoral & Obturator;  Surgeon: Tj Mayfield MD;  Location: [facility] PAIN MGT;  Service: Pain Management;  Laterality: Left;    REPAIR OF TRICEPS TENDON Left 10/17/2018    Procedure: REPAIR, TENDON, TRICEPS left elbow;  Surgeon: Staci Yarbrough MD;  Location: [facility] 1ST FLR;  Service: Orthopedics;  Laterality: Left;  Anesthesia: General and regional. PRONE, k-wire , hand pan 1 and pan 2, CALL ARTHREX/Tamera notified 10-12 LO    TONSILLECTOMY      TRANSFORAMINAL EPIDURAL INJECTION OF STEROID Right 08/31/2020    Procedure: INJECTION,  STEROID, EPIDURAL, TRANSFORAMINAL,  APPROACH, L3-L4 and L4-L5 need consent;  Surgeon: Tj Mayfield MD;  Location: [facility]IN [facility]GT;  Service: Pain Management;  Laterality: Right;    TRANSFORAMINAL EPIDURAL INJECTION OF STEROID Bilateral 07/23/2021    Procedure: INJECTION, STEROID, EPIDURAL, TRANSFORAMINAL APPROACH L4/5;  Surgeon: Tj Mayfield MD;  Location: [facility];  Service: Pain Management;  Laterality: Bilateral;    TRANSFORAMINAL EPIDURAL INJECTION OF STEROID Bilateral 09/25/2023    Procedure: LUMBAR TRANSFORAMINAL BILATERAL  L2/3 DIRECT REFERRAL;  Surgeon: Tj Mayfield MD;  Location: [facility];  Service: Pain Management;  Laterality: Bilateral;    TRANSFORAMINAL EPIDURAL INJECTION OF STEROID Left 10/18/2023    Procedure: LUMBAR TRANSFORAMINAL LEFT L3/4 AND L4/5 * CLEARANCE IN CHART*;  Surgeon: Tj Mayfield MD;  Location: [facility]IN [facility]GT;  Service: Pain Management;  Laterality: Left;    TRIGGER POINT INJECTION N/A 07/23/2021    Procedure: INJECTION, TRIGGER POINT SCAPULAR;  Surgeon: Tj Mayfield MD;  Location: [facility];  Service: Pain Management;  Laterality: N/A;    TUBAL LIGATION  2003    UPPER GASTROINTESTINAL ENDOSCOPY      10/11    uterine ablation  2003     Review of patient's allergies indicates:   Allergen Reactions    Actemra [tocilizumab] Other (See Comments)     Severe dizziness    Codeine Nausea And Vomiting and Hives    Gold au 198 Hives and Rash    Hydroxychloroquine Other (See Comments)     Can't remember the reaction      Iodinated contrast media Other (See Comments)     Other reaction(s): BURNING ALL OVER    Iodine Other (See Comments) and Hives     Other reaction(s): BURNING ALL OVER - Iodine dye - Not topical    Ondansetron Nausea And Vomiting    Sulfa (sulfonamide antibiotics) Other (See Comments)     Can't remember the reaction    Zofran [ondansetron hcl (pf)] Nausea And Vomiting     Pt reports that last time she received zofran she started vomiting again    Methotrexate  "analogues Nausea Only    Pneumococcal vaccine Other (See Comments)    Pneumovax 23 [pneumococcal 23-argenis ps vaccine] Other (See Comments)     "sick"    Methotrexate Nausea Only     Current Outpatient Medications   Medication Sig Dispense Refill    albuterol (PROVENTIL) 2.5 mg /3 mL (0.083 %) nebulizer solution Take 3 mLs (2.5 mg total) by nebulization every 6 (six) hours as needed for Wheezing. Rescue 75 mL 0    albuterol (PROVENTIL/VENTOLIN HFA) 90 mcg/actuation inhaler Inhale 2 puffs into the lungs every 6 (six) hours as needed. Rescue 20.1 g 11    amoxicillin-clavulanate 875-125mg (AUGMENTIN) 875-125 mg per tablet Take 1 tablet by mouth every 12 hours 20 tablet 0    apixaban (ELIQUIS) 5 mg Tab Take 2 tablets by mouth twice a day for 7 days and then 1 tablet twice a day thereafter 74 tablet 2    azelastine (ASTELIN) 137 mcg (0.1 %) nasal spray Use 1 spray (137 mcg total) in each nostril 2 (two) times daily. (Patient taking differently: 1 spray by Nasal route 2 (two) times daily as needed.) 30 mL 1    azithromycin (Z-DEVON) 250 MG tablet Take 2 tablets by mouth on day 1, then take 1 tablet daily until all taken 6 tablet 0    BD ULTRA-FINE JAIME PEN NEEDLE 32 gauge x 5/32" Ndle For Forteo - inject daily 100 each 3    budesonide-formoterol 160-4.5 mcg (SYMBICORT) 160-4.5 mcg/actuation HFAA Inhale 2 puffs into the lungs every 12 (twelve) hours. 30.6 g 3    calcium citrate-vitamin D3 315-200 mg (CITRACAL+D) 315 mg-5 mcg (200 unit) per tablet Take 1 tablet by mouth 2 (two) times daily.       chlorhexidine (PERIDEX) 0.12 % solution Rinse with 2 teaspoons (10ml) by mouth three times daily 473 mL 0    ciprofloxacin HCl (CIPRO) 500 MG tablet Take 1 tablet by mouth 2 (two) times daily for 3 days 6 tablet 0    cycloSPORINE (RESTASIS) 0.05 % ophthalmic emulsion Place 1 drop into both eyes 2 (two) times daily. 180 each 3    DAPTOmycin (CUBICIN) 500 mg injection Inject into the vein.      diclofenac sodium (VOLTAREN) 1 % Gel APPLY 2 " GRAMS TOPICALLY 4 (FOUR) TIMES DAILY AS NEEDED. 300 g 2    ertapenem (INVANZ) 1 gram injection       fluconazole (DIFLUCAN) 150 MG Tab Take 150 mg by mouth as needed.      fluconazole (DIFLUCAN) 150 MG Tab Take  1 tablet by mouth now 1 tablet 1    fluticasone propionate (FLONASE) 50 mcg/actuation nasal spray Use 1 spray (50 mcg total) in each nostril once daily. 16 g 1    HYDROmorphone (DILAUDID) 4 MG tablet Take 0.5 tablets (2 mg total) by mouth every 12 (twelve) hours as needed for Pain. (Patient taking differently: Take 2 mg by mouth every 12 (twelve) hours as needed for Pain. Pt is taking 1mg for break through only) 60 tablet 0    INV PROPULSID 10 MG Take 2 tablets (20 mg) by mouth 4 (four) times daily. FOR INVESTIGATIONAL USE ONLY. Protocol [id number] Subject ID: [id number]. 1440 each 0    leflunomide (ARAVA) 20 MG Tab Take 1 tablet (20 mg total) by mouth once daily. 90 tablet 0    LIDOcaine (LIDODERM) 5 % Place 1 patch onto the skin once daily. Remove & Discard patch within 12 hours or as directed by MD 30 patch 1    lifitegrast (XIIDRA) 5 % Dpet INSTILL ONE DROP INTO BOTH EYES TWICE A DAY 60 mL 10    magic mouthwash diphen/antac/lidoc rinse mouth with 5 ml for 30 seconds and spit out, twice daily 150 mL 2    methenamine (HIPREX) 1 gram Tab Take 1 tablet (1 g total) by mouth 2 (two) times daily. 180 tablet 3    mirabegron (MYRBETRIQ) 25 mg Tb24 ER tablet Take 1 tablet (25 mg total) by mouth once daily. 90 tablet 3    montelukast (SINGULAIR) 10 mg tablet Take 1 tablet (10 mg total) by mouth every evening. 90 tablet 3    naloxone (NARCAN) 4 mg/actuation Spry 4mg by nasal route as needed for opioid overdose; may repeat every 2-3 minutes in alternating nostrils until medical help arrives. Call 911 2 each 11    naproxen (NAPROSYN) 500 MG tablet TAKE 1 TABLET BY MOUTH TWICE A DAY AS NEEDED 180 tablet 0    omeprazole (PRILOSEC) 40 MG capsule Take 1 capsule (40 mg total) by mouth every morning. 30 capsule 6     omeprazole-sodium bicarbonate (ZEGERID) 40-1.1 mg-gram per capsule TAKE 1 CAPSULE BY MOUTH EVERY DAY IN THE MORNING 90 capsule 3    oxyCODONE (ROXICODONE) 10 mg Tab immediate release tablet Take 1 tablet by mouth every 4 (four) hours as needed (acute post op pain) for up to 7 days Max Daily Amount: 60 mg 42 tablet 0    potassium chloride SA (K-DUR,KLOR-CON) 20 MEQ tablet Take 2 tablets (40 mEq total) by mouth 2 (two) times daily. 120 tablet 2    predniSONE (DELTASONE) 5 MG tablet Take 2 tablets (10 mg total) by mouth once daily. After 1 week decrease to 1.5 tabs(7.5mg) for 1 week, then decrease to 1 tab (5mg) daily and continue 100 tablet 1    pregabalin (LYRICA) 150 MG capsule Take 1 capsule (150 mg total) by mouth 3 (three) times daily. 90 capsule 2    promethazine (PHENERGAN) 25 MG tablet Take 1 tablet (25 mg total) by mouth every 6 (six) hours as needed for Nausea. 90 tablet 5    rosuvastatin (CRESTOR) 20 MG tablet Take 1 tablet by mouth every day in the evening 90 tablet 3    rosuvastatin (CRESTOR) 20 MG tablet TAKE 1 TABLET BY MOUTH EVERY DAY IN THE EVENING 90 tablet 3    sarilumab (KEVZARA) 200 mg/1.14 mL Syrg Inject 1.14 mL (200 mg total) into the skin every 14 (fourteen) days. 2.28 mL 1    STIMULANT LAXATIVE PLUS 8.6-50 mg per tablet Take 1 tablet by mouth.      sucralfate (CARAFATE) 1 gram tablet TAKE 1 TABLET BY MOUTH 4 TIMES DAILY. 360 tablet 2    teriparatide (FORTEO) 20 mcg/dose (600mcg/2.4mL) PnIj Inject 0.08 mLs (20 mcg total) into the skin once daily. 2.4 mL 11    tiZANidine (ZANAFLEX) 4 MG tablet Take 1 tablet (4 mg total) by mouth every 6 (six) hours as needed (for muscle spasms). 120 tablet 0    albuterol-ipratropium (DUO-NEB) 2.5 mg-0.5 mg/3 mL nebulizer solution Take 3 mLs by nebulization every 6 (six) hours as needed for Wheezing. Rescue 90 mL 3    ibuprofen (ADVIL,MOTRIN) 600 MG tablet Take one tablet by mouth every 6 hours as needed for pain (Patient not taking: Reported on 7/17/2024) 20  tablet 0    ibuprofen (ADVIL,MOTRIN) 800 MG tablet Take 1 tablet by mouth every 6 to 8 hours as needed for pain (Patient not taking: Reported on 7/17/2024) 30 tablet 0    morphine (MS CONTIN) 30 MG 12 hr tablet Take 1 tablet (30 mg total) by mouth every 12 (twelve) hours. 60 tablet 0     No current facility-administered medications for this visit.     Family History   Problem Relation Name Age of Onset    Cancer Mother 69         Lung Cancer    Emphysema Mother 69     Heart attack Mother 69     Alcohol abuse Mother 69     Cancer Father          Lung Cancer    Osteoarthritis Father      Lung cancer Father      Skin cancer Father      Alcohol abuse Father      Heart disease Brother 57     Heart attack Brother 57     Alcohol abuse Brother 57     Cirrhosis Brother 57     Osteoarthritis Paternal Aunt      Retinal detachment Maternal Aunt      No Known Problems Sister      No Known Problems Maternal Uncle      No Known Problems Paternal Uncle      No Known Problems Maternal Grandmother      No Known Problems Maternal Grandfather      No Known Problems Paternal Grandmother      No Known Problems Paternal Grandfather      Colon cancer Neg Hx      Esophageal cancer Neg Hx      Stomach cancer Neg Hx      Celiac disease Neg Hx      Diabetes Neg Hx      Thyroid disease Neg Hx      Amblyopia Neg Hx      Blindness Neg Hx      Cataracts Neg Hx      Glaucoma Neg Hx      Hypertension Neg Hx      Macular degeneration Neg Hx      Strabismus Neg Hx      Stroke Neg Hx       Social History     Socioeconomic History    Marital status:    Tobacco Use    Smoking status: Never    Smokeless tobacco: Never   Substance and Sexual Activity    Alcohol use: Yes     Comment: 2-3 times per year.    Drug use: No    Sexual activity: Yes     Partners: Male     Birth control/protection: Surgical     Social Determinants of Health     Financial Resource Strain: Low Risk  (6/12/2024)    Received from [facility]    Overall Financial Resource Strain  (CARDIA)     Difficulty of Paying Living Expenses: Not hard at all   Food Insecurity: No Food Insecurity (6/12/2024)    Received from [facility]    Hunger Vital Sign     Worried About Running Out of Food in the Last Year: Never true     Ran Out of Food in the Last Year: Never true   Transportation Needs: No Transportation Needs (6/12/2024)    Received from [facility]    PRAPARE - Transportation     Lack of Transportation (Medical): No     Lack of Transportation (Non-Medical): No   Physical Activity: Patient Unable To Answer (6/12/2024)    Received from [facility]    Exercise Vital Sign     Days of Exercise per Week: Patient unable to answer     Minutes of Exercise per Session: Patient unable to answer   Stress: No Stress Concern Present (6/12/2024)    Received from [facility]    [employer] [place] [employer] - Occupational Stress Questionnaire     Feeling of Stress : Not at all   Housing Stability: Unknown (12/5/2023)    Housing Stability Vital Sign     Unable to Pay for Housing in the Last Year: Patient refused     Number of Places Lived in the Last Year: 1     Unstable Housing in the Last Year: No     REVIEW OF SYSTEMS:    GENERAL:  No weight loss, malaise or fevers.  HEENT:  Negative for frequent or significant headaches.  NECK:  Negative for lumps, goiter, pain and significant neck swelling.  RESPIRATORY:  Negative for cough, wheezing or shortness of breath.  CARDIOVASCULAR:  Negative for chest pain, leg swelling or palpitations.  GI:  Negative for abdominal discomfort, blood in stools or black stools or change in bowel habits.  MUSCULOSKELETAL:  See HPI. +Back pain.   SKIN:  Negative for lesions, rash, and itching.  PSYCH:  Negative for sleep disturbance, mood disorder and recent psychosocial stressors.  HEMATOLOGY/LYMPHOLOGY:  Negative for prolonged bleeding, bruising easily or swollen nodes.  NEURO:   No history of headaches, syncope, paralysis, seizures or tremors.  All other reviewed and  negative other than HPI.       Objective:   /74   Pulse 99   Temp 97.6 °F (36.4 °C)   Wt 60.3 kg (132 lb 15 oz)   LMP  (LMP Unknown)   BMI 25.12 kg/m²   Pain Disability Index Review:      7/17/2024     1:42 PM 6/27/2024     2:45 PM 12/26/2023    10:40 AM   Last 3 PDI Scores   Pain Disability Index (PDI) 54 60 56     PHYSICAL EXAMINATION:    GENERAL APPEARANCE: Well appearing, in no acute distress.  PSYCH:  Mood and affect appropriate.  SKIN: Skin color, texture, turgor normal, no rashes or lesions to visible areas.  HEAD/FACE:  Normocephalic, atraumatic.  NECK: No pain to palpation over the cervical paraspinous muscles. Spurling Negative. No pain with neck flexion, extension, or lateral flexion.   CARDIO: Rate regular.  No lower extremity edema. Capillary refill <2 seconds.   PULM: Bilateral chest rise. No apparent respiratory distress.   GI:  Non-distended  BACK: Back brace in place. Straight leg raising in the sitting position is negative to radicular pain. Limited ROM in all planes due to recent surgery and back brace.  EXTREMITIES: Peripheral joint ROM is full and pain free without obvious instability or laxity in all four extremities. No deformities, edema, or skin discoloration.   MUSCULOSKELETAL: Bilateral upper and lower extremity strength is normal and symmetric.  No atrophy or tone abnormalities are noted. B/L knee and hip tenderness to palpation.  NEURO: Bilateral upper and lower extremity coordination and muscle stretch reflexes are physiologic and symmetric.  Negative Arriaga's. Plantar response are downgoing. No clonus noted. No loss of sensation is noted.  GAIT: Antalgic-In a wheel chair.        Assessment:       Encounter Diagnoses   Name Primary?    Osteoarthritis of spine with radiculopathy, lumbar region Yes    Postlaminectomy syndrome of lumbar region     Chronic pain syndrome      Plan:   We discussed with the patient the assessment and recommendations. The following is the plan we  agreed on:    Previous records and imaging reviewed.   Discontinue Oxycodone 10 mg Q4 hours.  Patient will be started on 30 mg extended-release morphine Q 12 hours.    Patient will use oxycodone 10 mg for breakthrough pain no more than 3 times a day.   reviewed and is appropriate.  Continue to follow up with Neurosurgery and Rheumatology.  Continue in physical therapy.  RTC in 10 days for further opioid management.     The above plan and management options were discussed at length with patient. Patient is in agreement with the above and verbalized understanding.     Luis Cuevas MD  6/27/2024      I have personally taken the history and examined this patient and agree with the fellow's note as stated above.

## 2024-07-17 NOTE — TELEPHONE ENCOUNTER
Refill Decision Note   Joyce Peterson  is requesting a refill authorization.  Brief Assessment and Rationale for Refill:  Approve     Medication Therapy Plan:         Comments:     Note composed:12:49 PM 07/17/2024             Appointments     Last Visit   7/3/2024 Krystal Singleton, DO   Next Visit   7/22/2024 Krystal Singleton, DO

## 2024-07-22 ENCOUNTER — OFFICE VISIT (OUTPATIENT)
Dept: FAMILY MEDICINE | Facility: CLINIC | Age: 64
End: 2024-07-22
Payer: MEDICARE

## 2024-07-22 VITALS
WEIGHT: 133 LBS | BODY MASS INDEX: 25.13 KG/M2 | SYSTOLIC BLOOD PRESSURE: 110 MMHG | OXYGEN SATURATION: 97 % | DIASTOLIC BLOOD PRESSURE: 62 MMHG | TEMPERATURE: 98 F | HEART RATE: 87 BPM

## 2024-07-22 DIAGNOSIS — I82.451 ACUTE DEEP VEIN THROMBOSIS (DVT) OF RIGHT PERONEAL VEIN: Primary | ICD-10-CM

## 2024-07-22 DIAGNOSIS — E87.6 HYPOKALEMIA: ICD-10-CM

## 2024-07-22 PROBLEM — I82.441 ACUTE DEEP VEIN THROMBOSIS (DVT) OF TIBIAL VEIN OF RIGHT LOWER EXTREMITY: Status: ACTIVE | Noted: 2024-07-22

## 2024-07-22 PROCEDURE — 1159F MED LIST DOCD IN RCRD: CPT | Mod: CPTII,S$GLB,, | Performed by: STUDENT IN AN ORGANIZED HEALTH CARE EDUCATION/TRAINING PROGRAM

## 2024-07-22 PROCEDURE — 1160F RVW MEDS BY RX/DR IN RCRD: CPT | Mod: CPTII,S$GLB,, | Performed by: STUDENT IN AN ORGANIZED HEALTH CARE EDUCATION/TRAINING PROGRAM

## 2024-07-22 PROCEDURE — 3008F BODY MASS INDEX DOCD: CPT | Mod: CPTII,S$GLB,, | Performed by: STUDENT IN AN ORGANIZED HEALTH CARE EDUCATION/TRAINING PROGRAM

## 2024-07-22 PROCEDURE — 99999 PR PBB SHADOW E&M-EST. PATIENT-LVL V: CPT | Mod: PBBFAC,,, | Performed by: STUDENT IN AN ORGANIZED HEALTH CARE EDUCATION/TRAINING PROGRAM

## 2024-07-22 PROCEDURE — 99215 OFFICE O/P EST HI 40 MIN: CPT | Mod: S$GLB,,, | Performed by: STUDENT IN AN ORGANIZED HEALTH CARE EDUCATION/TRAINING PROGRAM

## 2024-07-22 PROCEDURE — 3078F DIAST BP <80 MM HG: CPT | Mod: CPTII,S$GLB,, | Performed by: STUDENT IN AN ORGANIZED HEALTH CARE EDUCATION/TRAINING PROGRAM

## 2024-07-22 PROCEDURE — 3074F SYST BP LT 130 MM HG: CPT | Mod: CPTII,S$GLB,, | Performed by: STUDENT IN AN ORGANIZED HEALTH CARE EDUCATION/TRAINING PROGRAM

## 2024-07-22 NOTE — PROGRESS NOTES
Subjective:       Patient ID: Joyce Peterson is a 63 y.o. female.    Chief Complaint: Follow-up (DVT, RA, hypokalemia)      Review of Systems   Musculoskeletal:  Positive for arthralgias, back pain, gait problem and myalgias.   All other systems reviewed and are negative.       A1C:  Recent Labs   Lab 03/15/22  1507   Hemoglobin A1C 5.2     CBC:  Recent Labs   Lab 06/18/24  0708 07/02/24  1019 07/17/24  1448   WBC 4.6 3.77 L 4.34   RBC  --  3.51 L 3.63 L   Hemoglobin 8.4 L 10.3 L 10.7 L   Hematocrit 24.5 L 32.0 L 33.6 L   Platelets  --  378 253   MCV 88.2 91 93   MCH 30.4 29.3 29.5   MCHC 34.5 32.2 31.8 L     CMP:  Recent Labs   Lab 03/11/24  1010 04/05/24  1015 07/02/24  1019 07/17/24  1448   Glucose 100 97 126 H  --    Calcium 9.7 8.9 9.3  --    Albumin 3.3 L 3.8 3.4 L  --    Total Protein 6.5 6.0 5.8 L  --    Sodium 139 140 141  --    Potassium 4.2 4.1 3.3 L 4.1   CO2 27 25 25  --    Chloride 105 107 105  --    BUN 12 10 15  --    Creatinine 0.6 0.7 0.6  --    Alkaline Phosphatase 257 H 89 176 H  --    ALT 31 29 18  --    AST 21 27 18  --    Total Bilirubin 0.3 0.4 0.4  --      LIPIDS:  Recent Labs   Lab 07/26/21  0801 11/17/21  0948 10/03/22  0849 10/18/22  1133 04/08/24  0814 07/02/24  1019   TSH 2.330   < >  --    < >  --  1.090   HDL 83 H  --  76 H  --  77 H  --    Cholesterol 197  --  183  --  178  --    Triglycerides 156 H  --  127  --  109  --    LDL Cholesterol 82.8  --  81.6  --  79.2  --    HDL/Cholesterol Ratio 42.1  --  41.5  --  43.3  --    Non-HDL Cholesterol 114  --  107  --  101  --    Total Cholesterol/HDL Ratio 2.4  --  2.4  --  2.3  --     < > = values in this interval not displayed.     TSH:  Recent Labs   Lab 11/17/21  0948 10/18/22  1133 07/02/24  1019   TSH 1.200 0.619 1.090        Objective:      Vitals:    07/22/24 1424   BP: 110/62   Pulse: 87   Temp: 97.9 °F (36.6 °C)      Physical Exam  Constitutional:       Appearance: Normal appearance.   HENT:      Head: Atraumatic.   Eyes:       Conjunctiva/sclera: Conjunctivae normal.   Cardiovascular:      Rate and Rhythm: Normal rate and regular rhythm.   Pulmonary:      Effort: Pulmonary effort is normal.   Neurological:      General: No focal deficit present.      Mental Status: She is alert and oriented to person, place, and time.   Psychiatric:         Mood and Affect: Mood normal.         Behavior: Behavior normal.          Assessment:       1. Acute deep vein thrombosis (DVT) of right peroneal vein    2. Hypokalemia        Plan:     Problem List Items Addressed This Visit          Renal/    Hypokalemia    Overview     40 meQ BID  Much improved  Will decrease back to 20 daily for maintenance             Hematology    Acute deep vein thrombosis (DVT) of right peroneal vein - Primary    Overview     Eliquis daily   Pain management   Continues with pain in the LE; aching   Slight improvement  Is seeing PM and getting better pain control regimen worked on, has f/u this week   Repeat US in 3 months scheduled with f/u with me after           Labs reviewed in detail  Problem list reviewed in detail   Continue healthy lifestyle efforts  Continue current meds as prescribed otherwise; refills per request  Keep routine specialist f/u   RTC in 3 months  following repeat US for DVT and/or PRN          [facility]   7/22/24     I spent a total of 41 minutes on the day of the visit.This includes face to face time and non-face to face time preparing to see the patient (eg, review of tests), obtaining and/or reviewing separately obtained history, documenting clinical information in the electronic or other health record, independently interpreting results and communicating results to the patient/family/caregiver, or care coordinator.

## 2024-07-24 ENCOUNTER — PATIENT MESSAGE (OUTPATIENT)
Dept: FAMILY MEDICINE | Facility: CLINIC | Age: 64
End: 2024-07-24
Payer: MEDICARE

## 2024-07-25 ENCOUNTER — OFFICE VISIT (OUTPATIENT)
Dept: PAIN MEDICINE | Facility: CLINIC | Age: 64
End: 2024-07-25
Attending: ANESTHESIOLOGY
Payer: MEDICARE

## 2024-07-25 VITALS
DIASTOLIC BLOOD PRESSURE: 69 MMHG | SYSTOLIC BLOOD PRESSURE: 106 MMHG | BODY MASS INDEX: 25.12 KG/M2 | WEIGHT: 132.94 LBS | TEMPERATURE: 97 F | HEART RATE: 86 BPM

## 2024-07-25 DIAGNOSIS — M25.561 PAIN IN BOTH KNEES, UNSPECIFIED CHRONICITY: ICD-10-CM

## 2024-07-25 DIAGNOSIS — G89.4 CHRONIC PAIN DISORDER: ICD-10-CM

## 2024-07-25 DIAGNOSIS — M25.562 PAIN IN BOTH KNEES, UNSPECIFIED CHRONICITY: ICD-10-CM

## 2024-07-25 DIAGNOSIS — Z98.1 S/P LUMBAR SPINAL FUSION: Primary | ICD-10-CM

## 2024-07-25 DIAGNOSIS — M43.06 SPONDYLOLYSIS OF LUMBAR REGION: ICD-10-CM

## 2024-07-25 DIAGNOSIS — M25.559 HIP PAIN, UNSPECIFIED LATERALITY: ICD-10-CM

## 2024-07-25 PROCEDURE — 1160F RVW MEDS BY RX/DR IN RCRD: CPT | Mod: CPTII,S$GLB,, | Performed by: ANESTHESIOLOGY

## 2024-07-25 PROCEDURE — 3008F BODY MASS INDEX DOCD: CPT | Mod: CPTII,S$GLB,, | Performed by: ANESTHESIOLOGY

## 2024-07-25 PROCEDURE — 3078F DIAST BP <80 MM HG: CPT | Mod: CPTII,S$GLB,, | Performed by: ANESTHESIOLOGY

## 2024-07-25 PROCEDURE — 3074F SYST BP LT 130 MM HG: CPT | Mod: CPTII,S$GLB,, | Performed by: ANESTHESIOLOGY

## 2024-07-25 PROCEDURE — 99213 OFFICE O/P EST LOW 20 MIN: CPT | Mod: GC,S$GLB,, | Performed by: ANESTHESIOLOGY

## 2024-07-25 PROCEDURE — 99999 PR PBB SHADOW E&M-EST. PATIENT-LVL IV: CPT | Mod: PBBFAC,,, | Performed by: ANESTHESIOLOGY

## 2024-07-25 PROCEDURE — 1159F MED LIST DOCD IN RCRD: CPT | Mod: CPTII,S$GLB,, | Performed by: ANESTHESIOLOGY

## 2024-07-25 NOTE — PROGRESS NOTES
Pain Follow up Note- Established Patient         Subjective:      Patient ID: Joyce Peterson is a 63 y.o. female.    Chief Complaint: Hip Pain (Both hips) and Knee Pain (Both knees )    Referred by: No ref. provider found       Back Pain    Hip Pain       Original HPI  She is here for follow-up for left hip pain.  She said the radiofrequency ablation she had in July of 2022 helped her tremendously.  Three months ago her pain started again on the lateral aspect of the left hip.  She is status post ORIF on the left hip.  She has heterotopic ossification.  She would like to repeat the procedure.  She said she will have to put off the spinal cord stimulator until an MRI is executed ordered by another provider.  No new symptomatology otherwise.    Interval history: 7/25/24:   Joyce Peterson returns to clinic for opioid management for her chronic low back pain, BL hip and knee pain. At her last visit, oxycodone 10 mg was discontinued and patient was started on 30 mg ER morphine. Patient was instructed to use 10 mg oxycodone for breakthrough pain no more than 3 times daily. Today, she reports that her pain is much improved since the switch in pain medication to morphine. She states that her pain is currently 3/10 in severity with the majority of her pain being in the right knee, localized around the lateral joint line. She is s/p L2-pelvic fusion and states that she was doing home health therapy but is about to start outpatient therapy. She reports that her pain is typically worse at the end of the day with throbbing in the right knee. Of note, she does have DVT present in the RLE. She also uses Tylenol and Lyrica for pain which helps. She denies any new weakness, paresthesias, changes in bowel or bladder function, or saddle anesthesia.      Interval History 7/17/2024:   Joyce Peterson with a history of RA and lumbar radiculopathy s/p L2-pelvis fusion, L5-S1 TLIF comes to the clinic for opioid management for  her for chronic lower back, B/L hips and B/L knee pain. Patient's current opioid management is done by [facility] but they will not prescirbe Oxycodone 10 Q4 hours beyond 6 weeks and recommended follow up with pain medicine. Patient said she gets good pain relief with the Oxycodone 10 Q4 hours. Pain is worse at nights, prolonged walking and sitting. She is also using Voltaren gel, Lyrica and Zanaflex. Will use tylenol PRN for break through pain. The pain is mostly in her hips and knees bilaterally.  Her lower back pain with radiation down her legs have improved after the surgery.  Patient said she got good pain relief and restoration of function with PT but her days were reduced from 3 days to 2 days. She has follow up with [facility] and Rheumatology.     Interval History 6/27/2024:  Joyce Peterson returns to clinic for follow-up for chronic back pain. She is s/p L2-pelvis fusion on 6/12/2024. She has noticed increased pain over the last 3 nights. She had a visit with [facility] on 6/25/2024 and was told this was normal healing and nerve regeneration from the surgery. The patient said [facility] will not prescribe her pain longer than 6 weeks s/p surgery.  Her current regimen from [facility] is oxycodone 10 mg q4 hours, hydromorphone 1 mg for breakthrough pain.  She also takes Lyrica 150 mg TID and Robaxin 750 mg QID.  She is not supposed to take Naproxen s/p surgery, but has taken it a couple times for severe pain.  The patient denies fever/night sweats, urinary incontinence, bowel incontinence, significant weight changes, significant motor weakness or changes, or loss of sensations. Her pain today is 6/10.    Interval History 4/17/2024:   Patient returns to clinic for follow up of low back and hip pain. She was scheduled for SCS trial however trial was not completed due to patient preference. She states she has been seen by neurosugery and orthopedic surgery, for which she plans to undergo back surgery. 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  She reports pain has been worsening  since last visit. She states she continues to take lyrica TID, robaxin 750mg QID, oxycodone 5 q.i.d. p.r.n.  for pain control, she states this has not provided her with relief x the last 3 weeks. She reports she has not been able to participate in HEP due to pain. Pain level at time of exam is reported to be 8.5/10. Patient requests she wants to return to a previous regimen of percocet and dilaudid until she is able to have back procedure. She expresses excruciating pain that runs down bilateral pain with any movement and with coughing.     Interval history  She returns for virtual follow up of back and hip pain. At her last OV, Dr. Mayfield recommended [place] SCS trial. She had her first part of the psych consult and has the second part this afternoon. She has had limited benefit with multiple interventions in the past including back surgery, procedures, PT and medications. She would like to move forward with SCS trial. Her pain today is 10/10.    Interval History 1/16/24:  Joyce Peterson returns for virtual follow up of low back and left hip pain. She is s/p L4/5 TLIF 6/22/23. She is planning for a left hip RFA and SCS trial with us. 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 She has since had an MRI of her L Spine in December that demonstrated a large amount of extruded disc material extending superiorly from L4-5 into the spinal canal with additional grade 2 anterolisthesis at this level contributing to severe spinal canal stenosis. She reports intermittent weakness in her left > right leg. Patient denies saddle anesthesia, bladder/bowel incontinence, ataxia, or increased falls. Shopping cart sign +. She is able to walk 1 block before needing to stop. Pain improves upon sitting. Pain currently ranges from 5-9/10, currently a 7/10. Pain  radiates down her both her legs anterolaterally down to the ankle with additional shin pain. She has been on percocet 5-325 QID PRN and lyrica 150mg TID. She received opioids from Ortho 5/325 in October addition to our Rx. Ortho saw patient today and is recommending an L2-Pelvis    Pertinent Surgeries:  6/12/2024 - L2-Pelvis Fusion ([place])    Pain Medication   30 mg Morphine extended release (started on 7/17/2024)  Pregabalin 150 mg   Oxycodone 10 mg Q 4 hours   Percocet 5 mg (discontinued)  Voltaren Gel   Tylenol     Past Medical History:   Diagnosis Date    Acid reflux     Allergy     Anemia     Asthma     Coronary artery disease     CS (cervical spondylosis) 03/08/2013    Degenerative disc disease     Degenerative joint disease of hindfoot, left 6/28/2022    Dry eyes     Dry mouth     Gastroparesis     History of methotrexate therapy 01/19/2022    Hyperlipidemia     Lateral meniscus derangement 04/06/2016    Lobular carcinoma in situ     Lumbar spondylosis 03/08/2013    Osteoarthritis     Osteoporosis     Pes planovalgus, acquired, left 6/28/2022    Rheumatoid arthritis(714.0)     Rupture of left triceps tendon 10/17/2018    Umbilical hernia 08/13/2015     Past Surgical History:   Procedure Laterality Date    BACK SURGERY  06/12/2024    revision of prior back surgery on 07/14/2023    BREAST BIOPSY Left 01/29/2002    core bx    CARPAL TUNNEL RELEASE Right 05/2017    CHOLECYSTECTOMY  2004    COLONOSCOPY      10/11    COLONOSCOPY N/A 06/29/2017    Procedure: COLONOSCOPY;  Surgeon: López Moore MD;  Location: [address]);  Service: Endoscopy;  Laterality: N/A;    EPIDURAL STEROID INJECTION N/A 11/18/2021    Procedure: INJECTION, STEROID, EPIDURAL, L5-S1 IL NEED CONSENT;  Surgeon: Tj Mayfield MD;  Location: [facility] PAIN MGT;  Service: Pain Management;  Laterality: N/A;    EPIDURAL STEROID INJECTION N/A 09/29/2022    Procedure: LUMBAR L3/L4 IL MADELINE CONTRAST;  Surgeon: Tj Mayfield MD;  Location: [facility]IN [facility]GT;   Service: Pain Management;  Laterality: N/A;    EPIDURAL STEROID INJECTION N/A 12/12/2022    Procedure: INJECTION, STEROID, EPIDURAL L5/S1 IL CONTRAST;  Surgeon: Tj Mayfield MD;  Location: [facility] PAIN MGT;  Service: Pain Management;  Laterality: N/A;    EPIDURAL STEROID INJECTION N/A 04/06/2023    Procedure: INJECTION, STEROID, EPIDURAL L5/S1;  Surgeon: Tj Mayfield MD;  Location: [facility] PAIN MGT;  Service: Pain Management;  Laterality: N/A;    FOOT ARTHRODESIS Left 01/11/2023    Procedure: FUSION, FOOT;  Surgeon: Ariella Finnegan MD;  Location: [facility] OR;  Service: Orthopedics;  Laterality: Left;  nimbus    FUSION, SPINE, LUMBAR, TLIF, POSTERIOR APPROACH, USING PEDICLE SCREW N/A 06/22/2023    Procedure: FUSION, SPINE, LUMBAR, TLIF, POSTERIOR APPROACH, USING PEDICLE SCREW L4/5 spinewave SNS:SSEP/EMG;  Surgeon: Jh Mosqueda MD;  Location: [address];  Service: Neurosurgery;  Laterality: N/A;    HARVESTING OF BONE GRAFT Left 01/11/2023    Procedure: SURGICAL PROCUREMENT, BONE GRAFT;  Surgeon: Ariella Finnegan MD;  Location: [facility] OR;  Service: Orthopedics;  Laterality: Left;    INJECTION OF ANESTHETIC AGENT AROUND NERVE Bilateral 08/23/2021    Procedure: BLOCK, NERVE, MEDIAL BRANCH L3,L4,L5;  Surgeon: Tj Mayfield MD;  Location: [facility] PAIN MGT;  Service: Pain Management;  Laterality: Bilateral;  1 of 2    INJECTION OF ANESTHETIC AGENT AROUND NERVE Bilateral 08/26/2021    Procedure: BLOCK, NERVE, MEDIAL BRANCH L3,L4,L5;  Surgeon: Tj Mayfield MD;  Location: [facility] PAIN [facility]GT;  Service: Pain Management;  Laterality: Bilateral;  2 of 2    INJECTION OF ANESTHETIC AGENT AROUND NERVE Left 07/07/2022    Procedure: BLOCK, NERVE, LEFT OBTURATOR AND FEMORAL;  Surgeon: Tj Mayfield MD;  Location: [facility] PAIN [facility]GT;  Service: Pain Management;  Laterality: Left;    INJECTION OF FACET JOINT Bilateral 03/12/2020    Procedure: FACET JOINT INJECTION (LUMBAR BLOCK) BILATERAL L4-5 AND L5-S1 DIRECT REFERRAL;  Surgeon: Tj Mayfield MD;  Location:  [facility]IN [facility]GT;  Service: Pain Management;  Laterality: Bilateral;  NEEDS CONSENT    INJECTION OF FACET JOINT Bilateral 03/29/2021    Procedure: INJECTION, FACET JOINT L3/4, L4/5, L5/S1;  Surgeon: Tj Mayfield MD;  Location: [facility] PAIN MGT;  Service: Pain Management;  Laterality: Bilateral;    INJECTION OF JOINT Right 07/30/2020    Procedure: INJECTION, JOINT, RIGHT HIP Interarticular under flouro;  Surgeon: Tj Mayfield MD;  Location: [facility] PAIN [facility]GT;  Service: Pain Management;  Laterality: Right;  INJECTION, JOINT, RIGHT HIP Interarticular under flouro    INJECTION OF JOINT Right 02/21/2022    Procedure: Injection, Joint RIGHT ISCHIAL BURSA;  Surgeon: jT Mayfield MD;  Location: [facility] PAIN [facility]GT;  Service: Pain Management;  Laterality: Right;    INTRAMEDULLARY RODDING OF FEMUR Left 05/21/2019    Procedure: INSERTION, INTRAMEDULLARY ARIEL, FEMUR;  Surgeon: López Duff MD;  [address];  Service: Orthopedics;  Laterality: Left;    LENGTHENING OF TENDON OF LOWER EXTREMITY Left 01/11/2023    Procedure: LENGTHENING, TENDON, LOWER EXTREMITY;  Surgeon: Ariella Finnegan MD;  Location: [facility];  Service: Orthopedics;  Laterality: Left;    MAGNETIC RESONANCE IMAGING N/A 05/29/2023    Procedure: MRI (Magnetic Resonance Imagine);  Surgeon: Sujey Robin;  Location: [facility];  Service: Anesthesiology;  Laterality: N/A;    MAGNETIC RESONANCE IMAGING N/A 01/10/2024    Procedure: MRI (Magnetic Resonance Imagine);  Surgeon: Sujey Robin;  Location: [facility];  Service: Anesthesiology;  Laterality: N/A;    RADIOFREQUENCY ABLATION Left 09/20/2021    Procedure: RADIOFREQUENCY ABLATION left L3,4,5 RFA  1 of 2  CONSENT NEEDED;  Surgeon: Tj Mayfield MD;  Location: [facility]IN [facility]GT;  Service: Pain Management;  Laterality: Left;    RADIOFREQUENCY ABLATION Right 10/04/2021    Procedure: RADIOFREQUENCY ABLATION  right L3,4,5 RFA   2 of 2  CONSENT NEEDED;  Surgeon: Tj Mayfield MD;  Location: [facility]IN [facility]GT;  Service: Pain Management;   Laterality: Right;    RADIOFREQUENCY ABLATION Left 04/21/2022    Procedure: Radiofrequency Ablation LEFT L3,L4,L5;  Surgeon: Tj Mayfield MD;  Location: [facility];  Service: Pain Management;  Laterality: Left;    RADIOFREQUENCY ABLATION Right 05/12/2022    Procedure: Radiofrequency Ablation RIGHT L3,L4,L5;  Surgeon: Tj Mayfield MD;  Location: [facility];  Service: Pain Management;  Laterality: Right;    RADIOFREQUENCY ABLATION Left 07/25/2022    Procedure: Radiofrequency Ablation Left Femoral & Obturator;  Surgeon: Tj Mayfield MD;  Location: [facility] PAIN MGT;  Service: Pain Management;  Laterality: Left;    REPAIR OF TRICEPS TENDON Left 10/17/2018    Procedure: REPAIR, TENDON, TRICEPS left elbow;  Surgeon: Staci Yarbrough MD;  Location: [facility] OR [address] [place] [address];  Service: Orthopedics;  Laterality: Left;  Anesthesia: General and regional. PRONE, k-wire , hand pan 1 and pan 2, CALL ARTHREX/Tamera notified 10-12 LO    TONSILLECTOMY      TRANSFORAMINAL EPIDURAL INJECTION OF STEROID Right 08/31/2020    Procedure: INJECTION, STEROID, EPIDURAL, TRANSFORAMINAL,  APPROACH, L3-L4 and L4-L5 need consent;  Surgeon: Tj Mayfield MD;  Location: [facility] PAIN [facility]GT;  Service: Pain Management;  Laterality: Right;    TRANSFORAMINAL EPIDURAL INJECTION OF STEROID Bilateral 07/23/2021    Procedure: INJECTION, STEROID, EPIDURAL, TRANSFORAMINAL APPROACH L4/5;  Surgeon: Tj Mayfield MD;  Location: [facility]IN [facility]GT;  Service: Pain Management;  Laterality: Bilateral;    TRANSFORAMINAL EPIDURAL INJECTION OF STEROID Bilateral 09/25/2023    Procedure: LUMBAR TRANSFORAMINAL BILATERAL  L2/3 DIRECT REFERRAL;  Surgeon: Tj Mayfield MD;  Location: [facility]IN [facility]GT;  Service: Pain Management;  Laterality: Bilateral;    TRANSFORAMINAL EPIDURAL INJECTION OF STEROID Left 10/18/2023    Procedure: LUMBAR TRANSFORAMINAL LEFT L3/4 AND L4/5 * CLEARANCE IN CHART*;  Surgeon: Tj Mayfield MD;  Location: [facility];  Service: Pain Management;   "Laterality: Left;    TRIGGER POINT INJECTION N/A 07/23/2021    Procedure: INJECTION, TRIGGER POINT SCAPULAR;  Surgeon: Tj Mayfield MD;  Location: [facility];  Service: Pain Management;  Laterality: N/A;    TUBAL LIGATION  2003    UPPER GASTROINTESTINAL ENDOSCOPY      10/11    uterine ablation  2003     Review of patient's allergies indicates:   Allergen Reactions    Actemra [tocilizumab] Other (See Comments)     Severe dizziness    Codeine Nausea And Vomiting and Hives    Gold au 198 Hives and Rash    Hydroxychloroquine Other (See Comments)     Can't remember the reaction      Iodinated contrast media Other (See Comments)     Other reaction(s): BURNING ALL OVER    Iodine Other (See Comments) and Hives     Other reaction(s): BURNING ALL OVER - Iodine dye - Not topical    Ondansetron Nausea And Vomiting    Sulfa (sulfonamide antibiotics) Other (See Comments)     Can't remember the reaction    Zofran [ondansetron hcl (pf)] Nausea And Vomiting     Pt reports that last time she received zofran she started vomiting again    Methotrexate analogues Nausea Only    Pneumococcal vaccine Other (See Comments)    Pneumovax 23 [pneumococcal 23-argenis ps vaccine] Other (See Comments)     "sick"    Methotrexate Nausea Only     Current Outpatient Medications   Medication Sig Dispense Refill    albuterol (PROVENTIL) 2.5 mg /3 mL (0.083 %) nebulizer solution Take 3 mLs (2.5 mg total) by nebulization every 6 (six) hours as needed for Wheezing. Rescue 75 mL 0    albuterol (PROVENTIL/VENTOLIN HFA) 90 mcg/actuation inhaler Inhale 2 puffs into the lungs every 6 (six) hours as needed. Rescue 20.1 g 11    amoxicillin-clavulanate 875-125mg (AUGMENTIN) 875-125 mg per tablet Take 1 tablet by mouth every 12 hours 20 tablet 0    apixaban (ELIQUIS) 5 mg Tab Take 2 tablets by mouth twice a day for 7 days and then 1 tablet twice a day thereafter 74 tablet 2    azelastine (ASTELIN) 137 mcg (0.1 %) nasal spray Use 1 spray (137 mcg total) in each " "nostril 2 (two) times daily. (Patient taking differently: 1 spray by Nasal route 2 (two) times daily as needed.) 30 mL 1    azithromycin (Z-DEVON) 250 MG tablet Take 2 tablets by mouth on day 1, then take 1 tablet daily until all taken 6 tablet 0    BD ULTRA-FINE JAIME PEN NEEDLE 32 gauge x 5/32" Ndle For Forteo - inject daily 100 each 3    budesonide-formoterol 160-4.5 mcg (SYMBICORT) 160-4.5 mcg/actuation HFAA Inhale 2 puffs into the lungs every 12 (twelve) hours. 30.6 g 3    calcium citrate-vitamin D3 315-200 mg (CITRACAL+D) 315 mg-5 mcg (200 unit) per tablet Take 1 tablet by mouth 2 (two) times daily.       chlorhexidine (PERIDEX) 0.12 % solution Rinse with 2 teaspoons (10ml) by mouth three times daily 473 mL 0    ciprofloxacin HCl (CIPRO) 500 MG tablet Take 1 tablet by mouth 2 (two) times daily for 3 days 6 tablet 0    cycloSPORINE (RESTASIS) 0.05 % ophthalmic emulsion Place 1 drop into both eyes 2 (two) times daily. 180 each 3    DAPTOmycin (CUBICIN) 500 mg injection Inject into the vein.      diclofenac sodium (VOLTAREN) 1 % Gel APPLY 2 GRAMS TOPICALLY 4 (FOUR) TIMES DAILY AS NEEDED. 300 g 2    ertapenem (INVANZ) 1 gram injection       fluconazole (DIFLUCAN) 150 MG Tab Take 150 mg by mouth as needed.      fluconazole (DIFLUCAN) 150 MG Tab Take  1 tablet by mouth now 1 tablet 1    fluticasone propionate (FLONASE) 50 mcg/actuation nasal spray Use 1 spray (50 mcg total) in each nostril once daily. 16 g 1    HYDROmorphone (DILAUDID) 4 MG tablet Take 0.5 tablets (2 mg total) by mouth every 12 (twelve) hours as needed for Pain. (Patient taking differently: Take 2 mg by mouth every 12 (twelve) hours as needed for Pain. Pt is taking 1mg for break through only) 60 tablet 0    ibuprofen (ADVIL,MOTRIN) 600 MG tablet Take one tablet by mouth every 6 hours as needed for pain 20 tablet 0    ibuprofen (ADVIL,MOTRIN) 800 MG tablet Take 1 tablet by mouth every 6 to 8 hours as needed for pain 30 tablet 0    INV PROPULSID 10 MG " Take 2 tablets (20 mg) by mouth 4 (four) times daily. FOR INVESTIGATIONAL USE ONLY. Protocol [id number]-154 Subject ID: [id number]. 1440 each 0    leflunomide (ARAVA) 20 MG Tab Take 1 tablet (20 mg total) by mouth once daily. 90 tablet 0    LIDOcaine (LIDODERM) 5 % Place 1 patch onto the skin once daily. Remove & Discard patch within 12 hours or as directed by MD 30 patch 1    lifitegrast (XIIDRA) 5 % Dpet INSTILL ONE DROP INTO BOTH EYES TWICE A DAY 60 mL 10    magic mouthwash diphen/antac/lidoc rinse mouth with 5 ml for 30 seconds and spit out, twice daily 150 mL 2    methenamine (HIPREX) 1 gram Tab Take 1 tablet (1 g total) by mouth 2 (two) times daily. 180 tablet 3    mirabegron (MYRBETRIQ) 25 mg Tb24 ER tablet Take 1 tablet (25 mg total) by mouth once daily. 90 tablet 3    montelukast (SINGULAIR) 10 mg tablet Take 1 tablet (10 mg total) by mouth every evening. 90 tablet 3    morphine (MS CONTIN) 30 MG 12 hr tablet Take 1 tablet (30 mg total) by mouth every 12 (twelve) hours. 60 tablet 0    naloxone (NARCAN) 4 mg/actuation Spry 4mg by nasal route as needed for opioid overdose; may repeat every 2-3 minutes in alternating nostrils until medical help arrives. Call 911 2 each 11    naproxen (NAPROSYN) 500 MG tablet TAKE 1 TABLET BY MOUTH TWICE A DAY AS NEEDED 180 tablet 0    omeprazole (PRILOSEC) 40 MG capsule Take 1 capsule (40 mg total) by mouth every morning. 30 capsule 6    omeprazole-sodium bicarbonate (ZEGERID) 40-1.1 mg-gram per capsule TAKE 1 CAPSULE BY MOUTH EVERY DAY IN THE MORNING 90 capsule 3    oxyCODONE (ROXICODONE) 10 mg Tab immediate release tablet Take 1 tablet by mouth every 4 (four) hours as needed (acute post op pain) for up to 7 days Max Daily Amount: 60 mg 42 tablet 0    potassium chloride SA (K-DUR,KLOR-CON) 20 MEQ tablet Take 2 tablets (40 mEq total) by mouth 2 (two) times daily. 120 tablet 2    predniSONE (DELTASONE) 5 MG tablet Take 2 tablets (10 mg total) by mouth once daily. After 1 week decrease  to 1.5 tabs(7.5mg) for 1 week, then decrease to 1 tab (5mg) daily and continue 100 tablet 1    pregabalin (LYRICA) 150 MG capsule Take 1 capsule (150 mg total) by mouth 3 (three) times daily. 90 capsule 2    promethazine (PHENERGAN) 25 MG tablet Take 1 tablet (25 mg total) by mouth every 6 (six) hours as needed for Nausea. 90 tablet 5    rosuvastatin (CRESTOR) 20 MG tablet Take 1 tablet by mouth every day in the evening 90 tablet 3    rosuvastatin (CRESTOR) 20 MG tablet TAKE 1 TABLET BY MOUTH EVERY DAY IN THE EVENING 90 tablet 3    sarilumab (KEVZARA) 200 mg/1.14 mL Syrg Inject 1.14 mL (200 mg total) into the skin every 14 (fourteen) days. 2.28 mL 1    STIMULANT LAXATIVE PLUS 8.6-50 mg per tablet Take 1 tablet by mouth.      sucralfate (CARAFATE) 1 gram tablet TAKE 1 TABLET BY MOUTH 4 TIMES DAILY. 360 tablet 2    teriparatide (FORTEO) 20 mcg/dose (600mcg/2.4mL) PnIj Inject 0.08 mLs (20 mcg total) into the skin once daily. 2.4 mL 11    tiZANidine (ZANAFLEX) 4 MG tablet Take 1 tablet (4 mg total) by mouth every 6 (six) hours as needed (for muscle spasms). 120 tablet 0    albuterol-ipratropium (DUO-NEB) 2.5 mg-0.5 mg/3 mL nebulizer solution Take 3 mLs by nebulization every 6 (six) hours as needed for Wheezing. Rescue 90 mL 3     No current facility-administered medications for this visit.     Family History   Problem Relation Name Age of Onset    Cancer Mother 69         Lung Cancer    Emphysema Mother 69     Heart attack Mother 69     Alcohol abuse Mother 69     Cancer Father          Lung Cancer    Osteoarthritis Father      Lung cancer Father      Skin cancer Father      Alcohol abuse Father      Heart disease Brother 57     Heart attack Brother 57     Alcohol abuse Brother 57     Cirrhosis Brother 57     Osteoarthritis Paternal Aunt      Retinal detachment Maternal Aunt      No Known Problems Sister      No Known Problems Maternal Uncle      No Known Problems Paternal Uncle      No Known Problems Maternal  Grandmother      No Known Problems Maternal Grandfather      No Known Problems Paternal Grandmother      No Known Problems Paternal Grandfather      Colon cancer Neg Hx      Esophageal cancer Neg Hx      Stomach cancer Neg Hx      Celiac disease Neg Hx      Diabetes Neg Hx      Thyroid disease Neg Hx      Amblyopia Neg Hx      Blindness Neg Hx      Cataracts Neg Hx      Glaucoma Neg Hx      Hypertension Neg Hx      Macular degeneration Neg Hx      Strabismus Neg Hx      Stroke Neg Hx       Social History     Socioeconomic History    Marital status:    Tobacco Use    Smoking status: Never    Smokeless tobacco: Never   Substance and Sexual Activity    Alcohol use: Yes     Comment: 2-3 times per year.    Drug use: No    Sexual activity: Yes     Partners: Male     Birth control/protection: Surgical     Social Determinants of Health     Financial Resource Strain: Low Risk  (6/12/2024)    Received from [facility]    Overall Financial Resource Strain (CARDIA)     Difficulty of Paying Living Expenses: Not hard at all   Food Insecurity: No Food Insecurity (6/12/2024)    Received from [facility]    Hunger Vital Sign     Worried About Running Out of Food in the Last Year: Never true     Ran Out of Food in the Last Year: Never true   Transportation Needs: No Transportation Needs (6/12/2024)    Received from [facility]    PRAPARE - Transportation     Lack of Transportation (Medical): No     Lack of Transportation (Non-Medical): No   Physical Activity: Patient Unable To Answer (6/12/2024)    Received from [facility]    Exercise Vital Sign     Days of Exercise per Week: Patient unable to answer     Minutes of Exercise per Session: Patient unable to answer   Stress: No Stress Concern Present (6/12/2024)    Received from [facility]    [employer] [place] [facility] [employer] - Occupational Stress Questionnaire     Feeling of Stress : Not at all   Housing Stability: Unknown (12/5/2023)    Housing Stability Vital Sign      Unable to Pay for Housing in the Last Year: Patient refused     Number of Places Lived in the Last Year: 1     Unstable Housing in the Last Year: No     REVIEW OF SYSTEMS:    GENERAL:  No weight loss, malaise or fevers.  HEENT:  Negative for frequent or significant headaches.  NECK:  Negative for lumps, goiter, pain and significant neck swelling.  RESPIRATORY:  Negative for cough, wheezing or shortness of breath.  CARDIOVASCULAR:  Negative for chest pain, leg swelling or palpitations.  GI:  Negative for abdominal discomfort, blood in stools or black stools or change in bowel habits.  MUSCULOSKELETAL:  See HPI. +Back pain.   SKIN:  Negative for lesions, rash, and itching.  PSYCH:  Negative for sleep disturbance, mood disorder and recent psychosocial stressors.  HEMATOLOGY/LYMPHOLOGY:  Negative for prolonged bleeding, bruising easily or swollen nodes.  NEURO:   No history of headaches, syncope, paralysis, seizures or tremors.  All other reviewed and negative other than HPI.       Objective:   /69   Pulse 86   Temp 97 °F (36.1 °C)   Wt 60.3 kg (132 lb 15 oz)   LMP  (LMP Unknown)   BMI 25.12 kg/m²   Pain Disability Index Review:      7/25/2024     2:47 PM 7/17/2024     1:42 PM 6/27/2024     2:45 PM   Last 3 PDI Scores   Pain Disability Index (PDI) 54 54 60     PHYSICAL EXAMINATION:    GENERAL APPEARANCE: Well appearing, in no acute distress. Using Wheelchair for mobility  PSYCH:  Mood and affect appropriate.  SKIN: Skin color, texture, turgor normal, no rashes or lesions to visible areas.  HEAD/FACE:  Normocephalic, atraumatic.  NECK: No pain to palpation over the cervical paraspinous muscles. Spurling Negative. No pain with neck flexion, extension, or lateral flexion.   CARDIO: Rate regular.  No lower extremity edema. Capillary refill <2 seconds.   PULM: Bilateral chest rise. No apparent respiratory distress.   GI:  Non-distended  BACK: Straight leg raising in the sitting position is negative to radicular  pain. Negative SANDRA/FADIR. Positive facet loading. Limited ROM in all planes due to recent surgery and back brace.  EXTREMITIES: Peripheral joint ROM is full and pain free without obvious instability or laxity in all four extremities. No deformities, edema, or skin discoloration.   MUSCULOSKELETAL: Bilateral upper and lower extremity strength is normal and symmetric.  No atrophy or tone abnormalities are noted. R knee tenderness to palpation.  NEURO: Bilateral upper and lower extremity coordination and muscle stretch reflexes are physiologic and symmetric.  Negative Arriaga's. Plantar response are downgoing. No clonus noted. No loss of sensation is noted.  GAIT: Antalgic-In a wheel chair.        Assessment:     Joyce Peterson is a 63 y.o. female with history of RA and s/p L2-pelvic fusion that presents for chronic low back, knee, and hip pain.      Encounter Diagnoses   Name Primary?    Spondylolysis of lumbar region     S/P lumbar spinal fusion Yes    Chronic pain disorder     Hip pain, unspecified laterality     Pain in both knees, unspecified chronicity        Plan:   We discussed with the patient the assessment and recommendations. The following is the plan we agreed on:    Previous records and imaging reviewed.   Continue 30 mg extended-release morphine Q 12 hours.     reviewed and is appropriate.  Continue to follow up with Neurosurgery and Rheumatology.  Continue in physical therapy.  RTC in 3 months.     The above plan and management options were discussed at length with patient. Patient is in agreement with the above and verbalized understanding.     Rachael Chanel DO  6/27/2024    I have personally taken the history and examined this patient and agree with the resident's note as stated above.

## 2024-07-28 ENCOUNTER — PATIENT MESSAGE (OUTPATIENT)
Dept: GASTROENTEROLOGY | Facility: CLINIC | Age: 64
End: 2024-07-28
Payer: MEDICARE

## 2024-07-30 ENCOUNTER — PATIENT MESSAGE (OUTPATIENT)
Dept: OPTOMETRY | Facility: CLINIC | Age: 64
End: 2024-07-30
Payer: MEDICARE

## 2024-07-30 ENCOUNTER — TELEPHONE (OUTPATIENT)
Dept: OPHTHALMOLOGY | Facility: CLINIC | Age: 64
End: 2024-07-30
Payer: MEDICARE

## 2024-07-30 NOTE — TELEPHONE ENCOUNTER
Called patient regarding her message she will call back when she ready to schedule an appointment

## 2024-07-31 ENCOUNTER — TELEPHONE (OUTPATIENT)
Dept: GASTROENTEROLOGY | Facility: CLINIC | Age: 64
End: 2024-07-31
Payer: MEDICARE

## 2024-07-31 PROBLEM — Z98.1 S/P LUMBAR FUSION: Status: ACTIVE | Noted: 2024-07-31

## 2024-07-31 NOTE — TELEPHONE ENCOUNTER
I spoke with the participant about needing a refill on her Cisapride. I let her know that I let our pharmacy know that she will be needing a refill. Her last appt was on 11MAR2024. She will be due for a 6 month follow up the beginning of Sept 2024. She will look to see how much medication she has left & we will make her follow up appt accordingly.

## 2024-08-08 ENCOUNTER — PATIENT MESSAGE (OUTPATIENT)
Dept: GASTROENTEROLOGY | Facility: CLINIC | Age: 64
End: 2024-08-08
Payer: MEDICARE

## 2024-08-12 ENCOUNTER — TELEPHONE (OUTPATIENT)
Dept: GASTROENTEROLOGY | Facility: CLINIC | Age: 64
End: 2024-08-12
Payer: MEDICARE

## 2024-08-12 DIAGNOSIS — K31.84 GASTROPARESIS: Primary | ICD-10-CM

## 2024-08-12 NOTE — TELEPHONE ENCOUNTER
Spoke with Ivan, research coordinator, stated she spoke with patient and is waiting on a reply from her.   Delilah

## 2024-08-14 ENCOUNTER — PATIENT MESSAGE (OUTPATIENT)
Dept: PAIN MEDICINE | Facility: CLINIC | Age: 64
End: 2024-08-14
Payer: MEDICARE

## 2024-08-14 DIAGNOSIS — K31.84 GASTROPARESIS: ICD-10-CM

## 2024-08-14 DIAGNOSIS — M79.18 MYOFASCIAL PAIN: ICD-10-CM

## 2024-08-14 DIAGNOSIS — M47.26 OSTEOARTHRITIS OF SPINE WITH RADICULOPATHY, LUMBAR REGION: ICD-10-CM

## 2024-08-14 DIAGNOSIS — M96.1 POSTLAMINECTOMY SYNDROME OF LUMBAR REGION: ICD-10-CM

## 2024-08-14 DIAGNOSIS — G57.03 PIRIFORMIS SYNDROME OF BOTH SIDES: ICD-10-CM

## 2024-08-14 DIAGNOSIS — Z00.6 PATIENT IN CLINICAL RESEARCH STUDY: ICD-10-CM

## 2024-08-14 RX ORDER — TIZANIDINE 4 MG/1
4 TABLET ORAL EVERY 6 HOURS PRN
Qty: 120 TABLET | Refills: 0 | OUTPATIENT
Start: 2024-08-14

## 2024-08-14 RX ORDER — TIZANIDINE 4 MG/1
4 TABLET ORAL EVERY 6 HOURS PRN
Qty: 120 TABLET | Refills: 0 | Status: SHIPPED | OUTPATIENT
Start: 2024-08-14

## 2024-08-14 RX ORDER — MORPHINE SULFATE 30 MG/1
30 TABLET, FILM COATED, EXTENDED RELEASE ORAL EVERY 12 HOURS
Qty: 60 TABLET | Refills: 0 | Status: SHIPPED | OUTPATIENT
Start: 2024-08-14

## 2024-08-14 NOTE — TELEPHONE ENCOUNTER
----- Message from Darya Feliciano sent at 8/14/2024  8:59 AM CDT -----  Who Called:        Refill or New Rx:   refill             RX Name and Strength:  morphine (MS CONTIN) 30 MG 12 hr tablet          Is this a 30 day or 90 day RX        Preferred Pharmacy with phone number: [facility] [place] [facility]            Local or Mail Order: local           Would the patient rather a call back or a response via [employer]? Call back         Best Call Back Number:        Additional Information:

## 2024-08-14 NOTE — TELEPHONE ENCOUNTER
Patient requesting refill on MS CONTIN  Last office visit 7/25/24   shows last refill on 7/17/24  Patient does not have a pain contract on file with [facility]  Patient last UDS no uds  was not consistent with current therapy

## 2024-08-14 NOTE — TELEPHONE ENCOUNTER
Staff spoke with patient and informed her that we've received her refill request and sent her messages letting her know we did. Staff verbalized understanding.

## 2024-08-16 DIAGNOSIS — K31.84 GASTROPARESIS: ICD-10-CM

## 2024-08-16 DIAGNOSIS — Z00.6 PATIENT IN CLINICAL RESEARCH STUDY: ICD-10-CM

## 2024-08-19 ENCOUNTER — PATIENT MESSAGE (OUTPATIENT)
Dept: UROLOGY | Facility: CLINIC | Age: 64
End: 2024-08-19
Payer: MEDICARE

## 2024-08-19 DIAGNOSIS — R39.15 URINARY URGENCY: ICD-10-CM

## 2024-08-19 DIAGNOSIS — N39.41 URGE URINARY INCONTINENCE: ICD-10-CM

## 2024-08-19 DIAGNOSIS — K31.84 GASTROPARESIS: Primary | ICD-10-CM

## 2024-08-19 RX ORDER — MIRABEGRON 25 MG/1
25 TABLET, FILM COATED, EXTENDED RELEASE ORAL DAILY
Qty: 30 TABLET | Refills: 0 | Status: SHIPPED | OUTPATIENT
Start: 2024-08-19 | End: 2024-09-19

## 2024-08-20 ENCOUNTER — PATIENT MESSAGE (OUTPATIENT)
Dept: GASTROENTEROLOGY | Facility: CLINIC | Age: 64
End: 2024-08-20
Payer: MEDICARE

## 2024-08-21 NOTE — PROGRESS NOTES
Patient here for right shoulder injection only    Right shoulder pain x several months. No trauma. Started working out at gym but this was after right shoulder pain onset    Right shoulder with FROM: ext rot 80, int rot T11,flex 90 ext 60 abd 90 add 20  No erythema, warmth or swelling  Normal motor strength  + pain with resisted abd/ext rot.     * After verbal consent and cleansing with Chloraprep the right subdeltoid bursa injected with Kenalog 40mg with 1 ml 1 % lidocaine. Patient tolerated procedure well.    Rest right shoulder x 3 d  If no improvement, message and will order OT/PT  X-ray right shoulder  Keep regularly scheduled appt next month  
no

## 2024-08-22 DIAGNOSIS — Z87.440 HISTORY OF RECURRENT UTIS: ICD-10-CM

## 2024-08-22 RX ORDER — METHENAMINE HIPPURATE 1000 MG/1
1 TABLET ORAL 2 TIMES DAILY
Qty: 60 TABLET | Refills: 0 | Status: SHIPPED | OUTPATIENT
Start: 2024-08-22 | End: 2024-09-21

## 2024-08-23 ENCOUNTER — TELEPHONE (OUTPATIENT)
Dept: FAMILY MEDICINE | Facility: CLINIC | Age: 64
End: 2024-08-23
Payer: MEDICARE

## 2024-08-23 NOTE — TELEPHONE ENCOUNTER
Called patient and made pt a nurse visit apt to come and get her EKG done. Pt made her apt for 9/26/2024 for 10:30 am.

## 2024-08-23 NOTE — TELEPHONE ENCOUNTER
----- Message from Linda Boone MA sent at 8/23/2024 10:23 AM CDT -----  Regarding: EKG  Hi, I am hoping someone can help me.  Mrs. Peterson is a patient at your clinic in [place].   She is needing an EKG scheduled on 9/26 for our Cisapride study.   She stated she has been there in the past for an EGD.  Our system is not allowing me to schedule the EKG and when I called your clinic the  was unable to help as well.  I am hoping someone there can help us schedule this.  Please let me know if there is any other information needed.   Thank you!!  Delilah/Dr.James Moore/JORY

## 2024-08-27 ENCOUNTER — PATIENT MESSAGE (OUTPATIENT)
Dept: PAIN MEDICINE | Facility: CLINIC | Age: 64
End: 2024-08-27
Payer: MEDICARE

## 2024-08-30 NOTE — TELEPHONE ENCOUNTER
Kevzara for RA refill order received- no changes to dose. Labs reviewed, refill appropriate. PA not needed, $55 copay @004.  
Initial (On Arrival)

## 2024-09-03 ENCOUNTER — OFFICE VISIT (OUTPATIENT)
Dept: FAMILY MEDICINE | Facility: CLINIC | Age: 64
End: 2024-09-03
Payer: MEDICARE

## 2024-09-03 VITALS
BODY MASS INDEX: 26.81 KG/M2 | WEIGHT: 142 LBS | DIASTOLIC BLOOD PRESSURE: 64 MMHG | HEIGHT: 61 IN | SYSTOLIC BLOOD PRESSURE: 118 MMHG | HEART RATE: 102 BPM | TEMPERATURE: 98 F | OXYGEN SATURATION: 97 %

## 2024-09-03 DIAGNOSIS — J45.20 MILD INTERMITTENT ASTHMA WITHOUT COMPLICATION: Primary | ICD-10-CM

## 2024-09-03 DIAGNOSIS — J32.9 SINUSITIS, UNSPECIFIED CHRONICITY, UNSPECIFIED LOCATION: ICD-10-CM

## 2024-09-03 PROCEDURE — 3074F SYST BP LT 130 MM HG: CPT | Mod: CPTII,S$GLB,, | Performed by: PEDIATRICS

## 2024-09-03 PROCEDURE — 99999 PR PBB SHADOW E&M-EST. PATIENT-LVL V: CPT | Mod: PBBFAC,,, | Performed by: PEDIATRICS

## 2024-09-03 PROCEDURE — 3008F BODY MASS INDEX DOCD: CPT | Mod: CPTII,S$GLB,, | Performed by: PEDIATRICS

## 2024-09-03 PROCEDURE — 99214 OFFICE O/P EST MOD 30 MIN: CPT | Mod: S$GLB,,, | Performed by: PEDIATRICS

## 2024-09-03 PROCEDURE — 3078F DIAST BP <80 MM HG: CPT | Mod: CPTII,S$GLB,, | Performed by: PEDIATRICS

## 2024-09-03 RX ORDER — DOXYCYCLINE 100 MG/1
100 CAPSULE ORAL 2 TIMES DAILY
Qty: 14 CAPSULE | Refills: 0 | Status: SHIPPED | OUTPATIENT
Start: 2024-09-03 | End: 2024-09-10

## 2024-09-03 RX ORDER — BENZONATATE 100 MG/1
100 CAPSULE ORAL 3 TIMES DAILY PRN
Qty: 30 CAPSULE | Refills: 0 | Status: SHIPPED | OUTPATIENT
Start: 2024-09-03 | End: 2024-09-13

## 2024-09-03 RX ORDER — ALBUTEROL SULFATE 90 UG/1
2 INHALANT RESPIRATORY (INHALATION) EVERY 6 HOURS PRN
Qty: 20.1 G | Refills: 11 | Status: SHIPPED | OUTPATIENT
Start: 2024-09-03

## 2024-09-03 NOTE — PROGRESS NOTES
"Subjective:      Patient ID: Joyce Peterson is a 63 y.o. female.    Chief Complaint: Sinus Problem and Nasal Congestion (Started 2+ weeks ago )    Patient reports a week of sinus drainage, severe headaches, cough, shortness of breath, pressure in face and eyes, body aches, chills. Has been using otc medications with little to no relief. Reports other family members have been sick.    Sinus Problem  Associated symptoms include congestion, shortness of breath and sinus pressure. Pertinent negatives include no chills, coughing, ear pain, headaches, sneezing or sore throat.     Review of Systems   Constitutional:  Negative for chills, fatigue and fever.   HENT:  Positive for congestion, postnasal drip, sinus pressure and sinus pain. Negative for ear pain, rhinorrhea, sneezing and sore throat.    Respiratory:  Positive for shortness of breath. Negative for cough, chest tightness and wheezing.    Cardiovascular:  Negative for chest pain and palpitations.   Gastrointestinal:  Negative for diarrhea, nausea and vomiting.   Genitourinary:  Negative for difficulty urinating.   Musculoskeletal:  Negative for arthralgias.   Skin:  Negative for rash.   Neurological:  Negative for dizziness and headaches.   Psychiatric/Behavioral:  Negative for confusion.         Current Outpatient Medications on File Prior to Visit   Medication Sig    albuterol (PROVENTIL) 2.5 mg /3 mL (0.083 %) nebulizer solution Take 3 mLs (2.5 mg total) by nebulization every 6 (six) hours as needed for Wheezing. Rescue    albuterol-ipratropium (DUO-NEB) 2.5 mg-0.5 mg/3 mL nebulizer solution Take 3 mLs by nebulization every 6 (six) hours as needed for Wheezing. Rescue    apixaban (ELIQUIS) 5 mg Tab Take 2 tablets by mouth twice a day for 7 days and then 1 tablet twice a day thereafter    BD ULTRA-FINE JAIME PEN NEEDLE 32 gauge x 5/32" Ndle For Forteo - inject daily    budesonide-formoterol 160-4.5 mcg (SYMBICORT) 160-4.5 mcg/actuation HFAA Inhale 2 puffs " into the lungs every 12 (twelve) hours.    calcium citrate-vitamin D3 315-200 mg (CITRACAL+D) 315 mg-5 mcg (200 unit) per tablet Take 1 tablet by mouth 2 (two) times daily.     cycloSPORINE (RESTASIS) 0.05 % ophthalmic emulsion Place 1 drop into both eyes 2 (two) times daily.    diclofenac sodium (VOLTAREN) 1 % Gel APPLY 2 GRAMS TOPICALLY 4 (FOUR) TIMES DAILY AS NEEDED.    fluconazole (DIFLUCAN) 100 MG tablet Take 1 tablet (100 mg total) by mouth once daily.    fluconazole (DIFLUCAN) 150 MG Tab Take 150 mg by mouth as needed.    ibuprofen (ADVIL,MOTRIN) 600 MG tablet Take one tablet by mouth every 6 hours as needed for pain    ibuprofen (ADVIL,MOTRIN) 800 MG tablet Take 1 tablet by mouth every 6 to 8 hours as needed for pain    INV PROPULSID 10 MG Take 2 tablets (20 mg) by mouth 4 (four) times daily. FOR INVESTIGATIONAL USE ONLY. Protocol [id number] Subject ID: [id number].    leflunomide (ARAVA) 20 MG Tab Take 1 tablet (20 mg total) by mouth once daily.    LIDOcaine (LIDODERM) 5 % Place 1 patch onto the skin once daily. Remove & Discard patch within 12 hours or as directed by MD    lifitegrast (XIIDRA) 5 % Dpet INSTILL ONE DROP INTO BOTH EYES TWICE A DAY    magic mouthwash diphen/antac/lidoc rinse mouth with 5 ml for 30 seconds and spit out, twice daily    methenamine (HIPREX) 1 gram Tab Take 1 tablet (1 g total) by mouth 2 (two) times daily.    mirabegron (MYRBETRIQ) 25 mg Tb24 ER tablet Take 1 tablet (25 mg total) by mouth once daily.    montelukast (SINGULAIR) 10 mg tablet Take 1 tablet (10 mg total) by mouth every evening.    morphine (MS CONTIN) 30 MG 12 hr tablet Take 1 tablet (30 mg total) by mouth every 12 (twelve) hours.    naloxone (NARCAN) 4 mg/actuation Spry 4mg by nasal route as needed for opioid overdose; may repeat every 2-3 minutes in alternating nostrils until medical help arrives. Call 911    naproxen (NAPROSYN) 500 MG tablet TAKE 1 TABLET BY MOUTH TWICE A DAY AS NEEDED    omeprazole (PRILOSEC) 40 MG  capsule Take 1 capsule (40 mg total) by mouth every morning.    omeprazole-sodium bicarbonate (ZEGERID) 40-1.1 mg-gram per capsule TAKE 1 CAPSULE BY MOUTH EVERY DAY IN THE MORNING    potassium chloride SA (K-DUR,KLOR-CON) 20 MEQ tablet Take 2 tablets (40 mEq total) by mouth 2 (two) times daily.    predniSONE (DELTASONE) 5 MG tablet Take 2 tablets (10 mg total) by mouth once daily. After 1 week decrease to 1.5 tabs(7.5mg) for 1 week, then decrease to 1 tab (5mg) daily and continue    pregabalin (LYRICA) 150 MG capsule Take 1 capsule (150 mg total) by mouth 3 (three) times daily.    promethazine (PHENERGAN) 25 MG tablet Take 1 tablet (25 mg total) by mouth every 6 (six) hours as needed for Nausea.    rosuvastatin (CRESTOR) 20 MG tablet TAKE 1 TABLET BY MOUTH EVERY DAY IN THE EVENING    sarilumab (KEVZARA) 200 mg/1.14 mL Syrg Inject 1.14 mL (200 mg total) into the skin every 14 (fourteen) days.    senna-docusate 8.6-50 mg (PERICOLACE) 8.6-50 mg per tablet Take 1 tablet by mouth daily    STIMULANT LAXATIVE PLUS 8.6-50 mg per tablet Take 1 tablet by mouth.    sucralfate (CARAFATE) 1 gram tablet TAKE 1 TABLET BY MOUTH 4 TIMES DAILY.    teriparatide (FORTEO) 20 mcg/dose (600mcg/2.4mL) PnIj Inject 0.08 mLs (20 mcg total) into the skin once daily.    tiZANidine (ZANAFLEX) 4 MG tablet Take 1 tablet (4 mg total) by mouth every 6 (six) hours as needed (for muscle spasms).    [DISCONTINUED] albuterol (PROVENTIL/VENTOLIN HFA) 90 mcg/actuation inhaler Inhale 2 puffs into the lungs every 6 (six) hours as needed. Rescue    amoxicillin-clavulanate 875-125mg (AUGMENTIN) 875-125 mg per tablet Take 1 tablet by mouth every 12 hours (Patient not taking: Reported on 9/3/2024)    azelastine (ASTELIN) 137 mcg (0.1 %) nasal spray Use 1 spray (137 mcg total) in each nostril 2 (two) times daily. (Patient not taking: Reported on 9/3/2024)    azithromycin (Z-DEVON) 250 MG tablet Take 2 tablets by mouth on day 1, then take 1 tablet daily until all  taken (Patient not taking: Reported on 9/3/2024)    chlorhexidine (PERIDEX) 0.12 % solution Rinse with 2 teaspoons (10ml) by mouth three times daily (Patient not taking: Reported on 9/3/2024)    ciprofloxacin HCl (CIPRO) 500 MG tablet Take 1 tablet by mouth 2 (two) times daily for 3 days (Patient not taking: Reported on 9/3/2024)    DAPTOmycin (CUBICIN) 500 mg injection Inject into the vein. (Patient not taking: Reported on 9/3/2024)    ertapenem (INVANZ) 1 gram injection  (Patient not taking: Reported on 9/3/2024)    fluconazole (DIFLUCAN) 150 MG Tab Take  1 tablet by mouth now (Patient not taking: Reported on 9/3/2024)    fluticasone propionate (FLONASE) 50 mcg/actuation nasal spray Use 1 spray (50 mcg total) in each nostril once daily.    HYDROmorphone (DILAUDID) 4 MG tablet Take 0.5 tablets (2 mg total) by mouth every 12 (twelve) hours as needed for Pain. (Patient not taking: Reported on 9/3/2024)    oxyCODONE (ROXICODONE) 10 mg Tab immediate release tablet Take 1 tablet by mouth every 4 (four) hours as needed (acute post op pain) for up to 7 days Max Daily Amount: 60 mg (Patient not taking: Reported on 9/3/2024)    rosuvastatin (CRESTOR) 20 MG tablet Take 1 tablet by mouth every day in the evening (Patient not taking: Reported on 9/3/2024)    [DISCONTINUED] estradioL (ESTRACE) 0.01 % (0.1 mg/gram) vaginal cream Place 0.5 gram vaginally twice a week.     No current facility-administered medications on file prior to visit.        Objective:     Vitals:    09/03/24 1440   BP: 118/64   Pulse: 102   Temp: 97.9 °F (36.6 °C)      Physical Exam  Constitutional:       General: She is not in acute distress.     Appearance: Normal appearance.   HENT:      Head: Normocephalic and atraumatic.      Right Ear: Ear canal and external ear normal. A middle ear effusion is present.      Left Ear: Ear canal and external ear normal. A middle ear effusion is present.      Nose:      Right Sinus: Frontal sinus tenderness present. No  maxillary sinus tenderness.      Left Sinus: Maxillary sinus tenderness and frontal sinus tenderness present.      Mouth/Throat:      Mouth: Mucous membranes are moist.      Pharynx: Oropharynx is clear.   Eyes:      Extraocular Movements: Extraocular movements intact.      Conjunctiva/sclera: Conjunctivae normal.      Pupils: Pupils are equal, round, and reactive to light.   Cardiovascular:      Rate and Rhythm: Normal rate and regular rhythm.      Pulses: Normal pulses.      Heart sounds: Normal heart sounds.   Pulmonary:      Effort: Pulmonary effort is normal. No respiratory distress.      Breath sounds: Examination of the right-upper field reveals wheezing. Examination of the left-upper field reveals wheezing. Examination of the right-middle field reveals wheezing. Wheezing present.   Abdominal:      General: Abdomen is flat. Bowel sounds are normal.   Musculoskeletal:         General: No swelling. Normal range of motion.      Cervical back: Normal range of motion and neck supple.   Skin:     General: Skin is warm and dry.      Findings: No rash.   Neurological:      Mental Status: She is alert and oriented to person, place, and time.      Gait: Gait normal.   Psychiatric:         Mood and Affect: Mood normal.         Behavior: Behavior normal.        Assessment:         1. Mild intermittent asthma without complication    2. Sinusitis, unspecified chronicity, unspecified location          Plan:   1. Mild intermittent asthma without complication  - albuterol (PROVENTIL/VENTOLIN HFA) 90 mcg/actuation inhaler; Inhale 2 puffs into the lungs every 6 (six) hours as needed for Wheezing. Rescue  Dispense: 20.1 g; Refill: 11  - doxycycline (VIBRAMYCIN) 100 MG Cap; Take 1 capsule (100 mg total) by mouth 2 (two) times daily. for 7 days  Dispense: 14 capsule; Refill: 0    2. Sinusitis, unspecified chronicity, unspecified location  - albuterol (PROVENTIL/VENTOLIN HFA) 90 mcg/actuation inhaler; Inhale 2 puffs into the lungs  every 6 (six) hours as needed for Wheezing. Rescue  Dispense: 20.1 g; Refill: 11  - doxycycline (VIBRAMYCIN) 100 MG Cap; Take 1 capsule (100 mg total) by mouth 2 (two) times daily. for 7 days  Dispense: 14 capsule; Refill: 0       -Use saline rinse as needed.  -Mist humidifier recommended.  -Warm compresses to face and neck several times daily as needed.  Begin medication regimen as discussed:  Mucinex D AM 12hr  Flonase BID  Zyrtec AM  Use Ibuprofen as needed for pain.  Start antibiotic as instructed, complete entire regimen.  Use inhaler as needed for shortness of breath.  Follow up as needed.      Justin Wesley NP   [facility]  9/3/24

## 2024-09-09 DIAGNOSIS — M06.9 RHEUMATOID ARTHRITIS, INVOLVING UNSPECIFIED SITE, UNSPECIFIED WHETHER RHEUMATOID FACTOR PRESENT: ICD-10-CM

## 2024-09-10 ENCOUNTER — PATIENT MESSAGE (OUTPATIENT)
Dept: PAIN MEDICINE | Facility: CLINIC | Age: 64
End: 2024-09-10
Payer: MEDICARE

## 2024-09-10 ENCOUNTER — TELEPHONE (OUTPATIENT)
Dept: PAIN MEDICINE | Facility: CLINIC | Age: 64
End: 2024-09-10
Payer: MEDICARE

## 2024-09-10 ENCOUNTER — TELEPHONE (OUTPATIENT)
Dept: RHEUMATOLOGY | Facility: CLINIC | Age: 64
End: 2024-09-10
Payer: MEDICARE

## 2024-09-10 ENCOUNTER — OFFICE VISIT (OUTPATIENT)
Dept: UROLOGY | Facility: CLINIC | Age: 64
End: 2024-09-10
Payer: MEDICARE

## 2024-09-10 VITALS
SYSTOLIC BLOOD PRESSURE: 116 MMHG | HEART RATE: 92 BPM | BODY MASS INDEX: 26.55 KG/M2 | DIASTOLIC BLOOD PRESSURE: 75 MMHG | WEIGHT: 140.63 LBS | HEIGHT: 61 IN

## 2024-09-10 DIAGNOSIS — R39.15 URINARY URGENCY: ICD-10-CM

## 2024-09-10 DIAGNOSIS — M47.26 OSTEOARTHRITIS OF SPINE WITH RADICULOPATHY, LUMBAR REGION: ICD-10-CM

## 2024-09-10 DIAGNOSIS — M79.18 MYOFASCIAL PAIN: ICD-10-CM

## 2024-09-10 DIAGNOSIS — N39.41 URGE URINARY INCONTINENCE: ICD-10-CM

## 2024-09-10 DIAGNOSIS — G57.03 PIRIFORMIS SYNDROME OF BOTH SIDES: ICD-10-CM

## 2024-09-10 DIAGNOSIS — Z87.440 HISTORY OF RECURRENT UTIS: ICD-10-CM

## 2024-09-10 DIAGNOSIS — M96.1 POSTLAMINECTOMY SYNDROME OF LUMBAR REGION: ICD-10-CM

## 2024-09-10 LAB
BILIRUB UR QL STRIP: NEGATIVE
CLARITY UR REFRACT.AUTO: CLEAR
COLOR UR AUTO: COLORLESS
GLUCOSE UR QL STRIP: NEGATIVE
HGB UR QL STRIP: NEGATIVE
KETONES UR QL STRIP: NEGATIVE
LEUKOCYTE ESTERASE UR QL STRIP: NEGATIVE
NITRITE UR QL STRIP: NEGATIVE
PH UR STRIP: 7 [PH] (ref 5–8)
PROT UR QL STRIP: NEGATIVE
SP GR UR STRIP: 1.01 (ref 1–1.03)
URN SPEC COLLECT METH UR: ABNORMAL

## 2024-09-10 PROCEDURE — 87086 URINE CULTURE/COLONY COUNT: CPT | Performed by: NURSE PRACTITIONER

## 2024-09-10 PROCEDURE — 81003 URINALYSIS AUTO W/O SCOPE: CPT | Performed by: NURSE PRACTITIONER

## 2024-09-10 PROCEDURE — 3078F DIAST BP <80 MM HG: CPT | Mod: CPTII,S$GLB,, | Performed by: NURSE PRACTITIONER

## 2024-09-10 PROCEDURE — 1160F RVW MEDS BY RX/DR IN RCRD: CPT | Mod: CPTII,S$GLB,, | Performed by: NURSE PRACTITIONER

## 2024-09-10 PROCEDURE — 87088 URINE BACTERIA CULTURE: CPT | Performed by: NURSE PRACTITIONER

## 2024-09-10 PROCEDURE — 3008F BODY MASS INDEX DOCD: CPT | Mod: CPTII,S$GLB,, | Performed by: NURSE PRACTITIONER

## 2024-09-10 PROCEDURE — 1159F MED LIST DOCD IN RCRD: CPT | Mod: CPTII,S$GLB,, | Performed by: NURSE PRACTITIONER

## 2024-09-10 PROCEDURE — 99999 PR PBB SHADOW E&M-EST. PATIENT-LVL V: CPT | Mod: PBBFAC,,, | Performed by: NURSE PRACTITIONER

## 2024-09-10 PROCEDURE — 87186 SC STD MICRODIL/AGAR DIL: CPT | Performed by: NURSE PRACTITIONER

## 2024-09-10 PROCEDURE — 99214 OFFICE O/P EST MOD 30 MIN: CPT | Mod: S$GLB,,, | Performed by: NURSE PRACTITIONER

## 2024-09-10 PROCEDURE — 3074F SYST BP LT 130 MM HG: CPT | Mod: CPTII,S$GLB,, | Performed by: NURSE PRACTITIONER

## 2024-09-10 RX ORDER — MIRABEGRON 25 MG/1
25 TABLET, FILM COATED, EXTENDED RELEASE ORAL DAILY
Qty: 30 TABLET | Refills: 11 | Status: SHIPPED | OUTPATIENT
Start: 2024-09-10 | End: 2025-09-10

## 2024-09-10 RX ORDER — SARILUMAB 200 MG/1.14ML
200 INJECTION, SOLUTION SUBCUTANEOUS
Qty: 2.28 ML | Refills: 1 | Status: ACTIVE | OUTPATIENT
Start: 2024-09-10

## 2024-09-10 RX ORDER — TIZANIDINE 4 MG/1
4 TABLET ORAL EVERY 6 HOURS PRN
Qty: 120 TABLET | Refills: 0 | Status: SHIPPED | OUTPATIENT
Start: 2024-09-13

## 2024-09-10 RX ORDER — METHENAMINE HIPPURATE 1000 MG/1
1 TABLET ORAL 2 TIMES DAILY
Qty: 60 TABLET | Refills: 11 | Status: SHIPPED | OUTPATIENT
Start: 2024-09-10 | End: 2025-09-10

## 2024-09-10 RX ORDER — MORPHINE SULFATE 30 MG/1
30 TABLET, FILM COATED, EXTENDED RELEASE ORAL EVERY 12 HOURS
Qty: 60 TABLET | Refills: 0 | Status: SHIPPED | OUTPATIENT
Start: 2024-09-12

## 2024-09-10 NOTE — PROGRESS NOTES
Subjective:       Patient ID: Joyce Peterson is a 63 y.o. female.    Chief Complaint: Annual Exam    Patient is a 62 yo WF who is here today for her annual urology exam. Patient reports she is doing well from a urology standpoint. She has a hx of OAB and recurrent UTIs. Patient is taking mirabegron 25 mg and methenamine 1 gram for those issues. Although her medications appear to be effective, she reports experiencing intermittent urinary leakage that is mild and urinary hesitancy at bedtime. Patient is S/P major back surgery with Dr. Zach Schuster at [facility] on 6/12/2024.     Follow-up  This is a chronic (OAB and hx of recurrent UTIs) problem. The current episode started more than 1 year ago. The problem occurs daily. The problem has been gradually improving. Associated symptoms include urinary symptoms and weakness. Pertinent negatives include no abdominal pain, change in bowel habit, chills, fever, nausea, swollen glands or vomiting. Nothing aggravates the symptoms. Treatments tried: mirabegron and methenamine. The treatment provided significant relief.     Review of Systems   Constitutional:  Negative for chills and fever.   Gastrointestinal:  Negative for abdominal pain, change in bowel habit, nausea and vomiting.   Genitourinary:  Positive for nocturia (x1). Negative for decreased urine volume, difficulty urinating, dysuria, flank pain, frequency, hematuria and urgency.   Neurological:  Positive for weakness.   Psychiatric/Behavioral: Negative.           Objective:      Physical Exam  Vitals and nursing note reviewed.   Constitutional:       General: She is not in acute distress.     Appearance: She is well-developed. She is not ill-appearing.   HENT:      Head: Normocephalic and atraumatic.   Eyes:      Pupils: Pupils are equal, round, and reactive to light.   Cardiovascular:      Rate and Rhythm: Normal rate.   Pulmonary:      Effort: Pulmonary effort is normal. No respiratory distress.   Abdominal:       Palpations: Abdomen is soft.      Tenderness: There is no abdominal tenderness.   Musculoskeletal:      Cervical back: Normal range of motion.      Comments: Ambulates with walker (S/P back sx)   Skin:     General: Skin is warm and dry.   Neurological:      Mental Status: She is alert and oriented to person, place, and time.      Coordination: Coordination normal.   Psychiatric:         Mood and Affect: Mood normal.         Behavior: Behavior normal.         Thought Content: Thought content normal.         Judgment: Judgment normal.         Assessment:       Problem List Items Addressed This Visit    None  Visit Diagnoses       Urinary urgency        Relevant Medications    mirabegron (MYRBETRIQ) 25 mg Tb24 ER tablet    Other Relevant Orders    Urine culture    Urinalysis    Urge urinary incontinence        Relevant Medications    mirabegron (MYRBETRIQ) 25 mg Tb24 ER tablet    Other Relevant Orders    Urine culture    Urinalysis    History of recurrent UTIs        Relevant Medications    methenamine (HIPREX) 1 gram Tab    Other Relevant Orders    Urine culture    Urinalysis            Plan:           Joyce was seen today for annual exam.    Diagnoses and all orders for this visit:    Urinary urgency  -     mirabegron (MYRBETRIQ) 25 mg Tb24 ER tablet; Take 1 tablet (25 mg total) by mouth once daily.  -     Urine culture  -     Urinalysis    Urge urinary incontinence  -     mirabegron (MYRBETRIQ) 25 mg Tb24 ER tablet; Take 1 tablet (25 mg total) by mouth once daily.  -     Urine culture  -     Urinalysis    History of recurrent UTIs  -     methenamine (HIPREX) 1 gram Tab; Take 1 tablet (1 g total) by mouth 2 (two) times daily.  -     Urine culture  -     Urinalysis    Other order  Continue taking mirabegron 25 mg daily for OAB and methenamine 1 gram BID for UTI prevention.    Follow-up in 1 year or sooner if needed.    Anastasia Healy, DNP

## 2024-09-10 NOTE — PATIENT INSTRUCTIONS
U/A and urine cx today  Continue taking mirabegron 25 mg daily for OAB and methenamine 1 gram BID for UTI prevention. REFILLED  Follow-up in 1 year or sooner if needed.

## 2024-09-10 NOTE — TELEPHONE ENCOUNTER
Patient requesting refill on MS CONTIN   Last office visit 7/25/24   shows last refill on 8/14/24  Patient does not have a pain contract on file with [facility]  Patient last UDS no uds on file  was not consistent with current therapy

## 2024-09-10 NOTE — TELEPHONE ENCOUNTER
----- Message from Da Tinajero sent at 9/10/2024  3:44 PM CDT -----  Contact: Chantel  Type:  Patient Call          Who Called: Patient        Does the patient know what this is regarding?: Requesting a call back about Rx morphine (MS CONTIN) 30 MG 12 hr tablet ; pt said that a new script with no stipulation should be called in so that the patient can have the script filled on today ; script that was sent over is set to be filled on Thursday and the pharmacy isn't sure if they'll be open ;  please advise           Would the patient rather a call back or a response via [facility]?call           Best Call Back Number: [phone]             Additional Information:  [facility]  [address] [address]  [place] [address]  Phone: [phone] Fax: [phone]

## 2024-09-11 ENCOUNTER — TELEPHONE (OUTPATIENT)
Dept: PAIN MEDICINE | Facility: CLINIC | Age: 64
End: 2024-09-11
Payer: MEDICARE

## 2024-09-11 NOTE — TELEPHONE ENCOUNTER
----- Message from Leonor Arreola sent at 9/10/2024  4:50 PM CDT -----  Regarding: morphine (MS CONTIN) 30 MG 12 hr tablet  Type:  Patient Returning Call    Who Called:patient  Who Left Message for Patient:Rosa  Does the patient know what this is regarding?:morphine (MS CONTIN) 30 MG 12 hr tablet  Would the patient rather a call back or a response via [employer]? call  Best Call Back Number:[phone]  Additional Information: needs doctor to send in new RX without fill restricted fill day, so that it can be filled today at [facility]

## 2024-09-13 ENCOUNTER — TELEPHONE (OUTPATIENT)
Dept: UROLOGY | Facility: CLINIC | Age: 64
End: 2024-09-13
Payer: MEDICARE

## 2024-09-13 DIAGNOSIS — N30.00 ACUTE CYSTITIS WITHOUT HEMATURIA: Primary | ICD-10-CM

## 2024-09-13 LAB — BACTERIA UR CULT: ABNORMAL

## 2024-09-13 RX ORDER — NITROFURANTOIN 25; 75 MG/1; MG/1
100 CAPSULE ORAL 2 TIMES DAILY
Qty: 20 CAPSULE | Refills: 0 | Status: SHIPPED | OUTPATIENT
Start: 2024-09-13 | End: 2024-09-23

## 2024-09-13 NOTE — PROGRESS NOTES
Diagnoses and all orders for this visit:    Acute cystitis without hematuria  -     Urine culture; Future  -     nitrofurantoin, macrocrystal-monohydrate, (MACROBID) 100 MG capsule; Take 1 capsule (100 mg total) by mouth 2 (two) times daily. for 10 days    Other order  Hold methenamine while taking Macrobid antibiotic. May resume methenamine once antibiotic is completed.   Repeat urine cx after completion of antibiotic therapy.     Anastasia Healy, DNP

## 2024-09-13 NOTE — TELEPHONE ENCOUNTER
----- Message from Anastasia Healy DNP sent at 9/13/2024  9:59 AM CDT -----  Please inform patient via telephone that her urine cx came back positive for a UTI. Script for Macrobid sent to [facility]. Hold methenamine while taking Macrobid antibiotic. May resume methenamine once antibiotic is completed. Repeat urine cx after completion of antibiotic therapy. Order placed.

## 2024-09-14 DIAGNOSIS — J45.20 CHRONIC ASTHMA, MILD INTERMITTENT, UNCOMPLICATED: ICD-10-CM

## 2024-09-14 NOTE — TELEPHONE ENCOUNTER
No care due was identified.  [facility] Embedded Care Due Messages. Reference number: [id number].   9/14/2024 12:28:54 PM CDT

## 2024-09-15 NOTE — TELEPHONE ENCOUNTER
Refill Routing Note   Medication(s) are not appropriate for processing by [facility] for the following reason(s):        Due for refill >6 months ago    [facility] action(s):  Defer               Appointments  past 12m or future 3m with PCP    Date Provider   Last Visit   7/22/2024 Krystal Singleton, DO   Next Visit   10/7/2024 Krystal Singleton, DO   ED visits in past 90 days: 0        Note composed:4:21 PM 09/15/2024

## 2024-09-16 RX ORDER — BUDESONIDE AND FORMOTEROL FUMARATE DIHYDRATE 160; 4.5 UG/1; UG/1
2 AEROSOL RESPIRATORY (INHALATION) EVERY 12 HOURS
Qty: 30.6 G | Refills: 3 | Status: SHIPPED | OUTPATIENT
Start: 2024-09-16

## 2024-09-24 ENCOUNTER — PATIENT MESSAGE (OUTPATIENT)
Dept: FAMILY MEDICINE | Facility: CLINIC | Age: 64
End: 2024-09-24
Payer: MEDICARE

## 2024-09-24 ENCOUNTER — TELEPHONE (OUTPATIENT)
Dept: OPHTHALMOLOGY | Facility: CLINIC | Age: 64
End: 2024-09-24
Payer: MEDICARE

## 2024-09-24 ENCOUNTER — PATIENT MESSAGE (OUTPATIENT)
Dept: PAIN MEDICINE | Facility: CLINIC | Age: 64
End: 2024-09-24
Payer: MEDICARE

## 2024-09-24 NOTE — TELEPHONE ENCOUNTER
Staff spoke with patient and informed her that Dr. Mayfield's decision was to go down from 30mg of Morphine to 15mg of Morphine twice a day for the month of October and on her f/u Valarie webb for needs of meds and choose one per Dr. Mayfield.

## 2024-09-24 NOTE — TELEPHONE ENCOUNTER
30 days ordered; pt needs repeat US LE to see if needs to continue for longer or if can get off med

## 2024-09-26 ENCOUNTER — PATIENT MESSAGE (OUTPATIENT)
Dept: UROLOGY | Facility: CLINIC | Age: 64
End: 2024-09-26
Payer: MEDICARE

## 2024-09-26 ENCOUNTER — PATIENT MESSAGE (OUTPATIENT)
Dept: GASTROENTEROLOGY | Facility: CLINIC | Age: 64
End: 2024-09-26
Payer: MEDICARE

## 2024-09-26 ENCOUNTER — PATIENT MESSAGE (OUTPATIENT)
Dept: HEMATOLOGY/ONCOLOGY | Facility: CLINIC | Age: 64
End: 2024-09-26
Payer: MEDICARE

## 2024-09-26 ENCOUNTER — CLINICAL SUPPORT (OUTPATIENT)
Dept: FAMILY MEDICINE | Facility: CLINIC | Age: 64
End: 2024-09-26
Payer: MEDICARE

## 2024-09-26 DIAGNOSIS — K31.84 GASTROPARESIS: ICD-10-CM

## 2024-09-26 LAB
OHS QRS DURATION: 72 MS
OHS QTC CALCULATION: 414 MS

## 2024-09-26 PROCEDURE — 93010 ELECTROCARDIOGRAM REPORT: CPT | Mod: S$GLB,,, | Performed by: STUDENT IN AN ORGANIZED HEALTH CARE EDUCATION/TRAINING PROGRAM

## 2024-09-26 PROCEDURE — 93005 ELECTROCARDIOGRAM TRACING: CPT | Mod: S$GLB,,, | Performed by: INTERNAL MEDICINE

## 2024-09-26 NOTE — PROGRESS NOTES
Patient came into the clinic to complete an EKG for Dr. López Moore for Gastroparesis. Patient completed EKG well with no problems at all. EKG was normal.

## 2024-09-29 NOTE — TELEPHONE ENCOUNTER
Progress Note - Infectious Disease   Zain Gayle 57 y.o. male MRN: [id number]  Unit/Bed#: -01 Encounter: [id number]      Impression/Recommendations:  1.  Left foot cellulitis, secondary to left hallux amputation wound dehiscence.  Patient has mild leukocytosis but no fever.  He is clinically and systemically well, without sepsis or systemic toxicity.  Operative culture from  is growing Pseudomonas.  Pseudomonas is intermittently susceptible to cefepime but fully susceptible to fluoroquinolones.  Admission blood cultures have no growth thus far.  Change antibiotic to p.o. Levaquin.  Treat x 7 day Levaquin course.     2.  Left first metatarsal head osteomyelitis, secondary to left hallux amputation wound dehiscence and exposed metatarsal head.  Patient is status post resection of first metatarsal head.  Per discussion with podiatrist, this was a surgical cure.  No antibiotic needed for this indication.  Wound care per podiatry.     3.  DM, poorly controlled, with hyperglycemia and elevated hemoglobin A1c.  This contributes to poor wound healing and recurrent infection above.  Management per primary service.     Discussed with patient in detail regarding the above plan.  Discussed with Dr. Blackwood from primary service regarding antibiotic plan above.  He is in agreement.    Antibiotics:  Cefepime  POD # 2    Subjective:  Patient feels well.  Pain in left foot is improved.  Temperature stays down.  No chills.  He is tolerating antibiotic well.  No nausea, vomiting or diarrhea.    Objective:  Vitals:  Temp:  [97.9 °F (36.6 °C)-98.7 °F (37.1 °C)] 98.7 °F (37.1 °C)  HR:  [60-66] 65  Resp:  [20] 20  BP: (124-150)/(67-98) 124/98  SpO2:  [96 %-99 %] 97 %  Temp (24hrs), Av.3 °F (36.8 °C), Min:97.9 °F (36.6 °C), Max:98.7 °F (37.1 °C)  Current: Temperature: 98.7 °F (37.1 °C)    Physical Exam:     General: Awake, alert, cooperative, no distress.   Neck:  Supple. No mass.  No lymphadenopathy.   Lungs: Expansion  Returned patient call in regards of CT scan results. Informed patient that Dr. Whaley is not in the office today, but I will send her the message. Patient verbalized understanding and thanked me for calling.    ----- Message from Karl Dudley sent at 8/26/2022  9:32 AM CDT -----  Contact: pt  Type: Requesting to speak with nurse        Who Called: PT  Regarding: results for CT SCAN. Please call as soon as possible today.   Would the patient rather a call back or a response via [facility]? Call back  Best Call Back Number: [phone]  Additional Information:       symmetric, no rales, no wheezing, respirations unlabored.   Heart:  Regular rate and rhythm, S1 and S2 normal, no murmur.   Abdomen: Soft, nondistended, non-tender, bowel sounds active all four quadrants, no masses, no organomegaly.   Extremities: Stable mild leg edema.  Left foot with dressing in place.  Dressing is dry.  No erythema/warmth beyond dressing.  Stable mild tenderness.   Skin:  No rash.   Neuro: Moves all extremities.     Invasive Devices       Peripheral Intravenous Line  Duration             Peripheral IV 09/26/24 Distal;Right;Upper;Ventral (anterior) Arm 2 days                    Labs studies:   I have personally reviewed pertinent labs.  Results from last 7 days   Lab Units 09/29/24  0426 09/28/24  0525 09/27/24  0542 09/26/24  1155   POTASSIUM mmol/L 3.9 4.1 3.9 4.2   CHLORIDE mmol/L 102 103 104 102   CO2 mmol/L 24 24 25 24   BUN mg/dL 24 28* 35* 39*   CREATININE mg/dL 0.87 1.02 1.15 1.06   EGFR ml/min/1.73sq m 95 81 70 77   CALCIUM mg/dL 8.6 8.8 8.8 9.8   AST U/L  --   --  10* 12*   ALT U/L  --   --  11 14   ALK PHOS U/L  --   --  87 110*     Results from last 7 days   Lab Units 09/29/24  0426 09/28/24  0525 09/27/24  0542   WBC Thousand/uL 9.38 10.24* 10.07   HEMOGLOBIN g/dL 11.9* 12.2 12.3   PLATELETS Thousands/uL 257 313 320     Results from last 7 days   Lab Units 09/27/24  1125 09/26/24  1200 09/26/24  1155   BLOOD CULTURE   --  No Growth at 48 hrs. No Growth at 48 hrs.   GRAM STAIN RESULT  Rare Polys  No bacteria seen  --   --    WOUND CULTURE  1+ Growth of Pseudomonas aeruginosa MDR*  1+ Growth of  --   --        Imaging Studies:   I have personally reviewed pertinent imaging study reports and images in PACS.    EKG, Pathology, and Other Studies:   I have personally reviewed pertinent reports.

## 2024-09-30 ENCOUNTER — TELEPHONE (OUTPATIENT)
Dept: UROLOGY | Facility: CLINIC | Age: 64
End: 2024-09-30
Payer: MEDICARE

## 2024-09-30 ENCOUNTER — PATIENT MESSAGE (OUTPATIENT)
Dept: GASTROENTEROLOGY | Facility: CLINIC | Age: 64
End: 2024-09-30
Payer: MEDICARE

## 2024-09-30 NOTE — TELEPHONE ENCOUNTER
Spoke with patient.  Aware we cannot do a virtual visit.   Will need to reschedule to office visit.   Will let her know once scheduled.   Delilah

## 2024-09-30 NOTE — TELEPHONE ENCOUNTER
----- Message from Anastasia Healy DNP sent at 9/30/2024 12:56 PM CDT -----  Please inform patient via telephone that her urine cx was normal. Her UTI has resolved.

## 2024-10-07 ENCOUNTER — OFFICE VISIT (OUTPATIENT)
Dept: FAMILY MEDICINE | Facility: CLINIC | Age: 64
End: 2024-10-07
Payer: MEDICARE

## 2024-10-07 VITALS
BODY MASS INDEX: 27.58 KG/M2 | HEART RATE: 93 BPM | SYSTOLIC BLOOD PRESSURE: 116 MMHG | DIASTOLIC BLOOD PRESSURE: 68 MMHG | WEIGHT: 146.06 LBS | HEIGHT: 61 IN | TEMPERATURE: 98 F | OXYGEN SATURATION: 95 %

## 2024-10-07 DIAGNOSIS — H65.01 NON-RECURRENT ACUTE SEROUS OTITIS MEDIA OF RIGHT EAR: ICD-10-CM

## 2024-10-07 DIAGNOSIS — M79.604 PAIN IN RIGHT LEG: ICD-10-CM

## 2024-10-07 DIAGNOSIS — I82.451 ACUTE DEEP VEIN THROMBOSIS (DVT) OF RIGHT PERONEAL VEIN: ICD-10-CM

## 2024-10-07 DIAGNOSIS — J30.1 SEASONAL ALLERGIC RHINITIS DUE TO POLLEN: Primary | ICD-10-CM

## 2024-10-07 PROCEDURE — 3074F SYST BP LT 130 MM HG: CPT | Mod: CPTII,S$GLB,, | Performed by: FAMILY MEDICINE

## 2024-10-07 PROCEDURE — 99204 OFFICE O/P NEW MOD 45 MIN: CPT | Mod: S$GLB,,, | Performed by: FAMILY MEDICINE

## 2024-10-07 PROCEDURE — 1159F MED LIST DOCD IN RCRD: CPT | Mod: CPTII,S$GLB,, | Performed by: FAMILY MEDICINE

## 2024-10-07 PROCEDURE — 3078F DIAST BP <80 MM HG: CPT | Mod: CPTII,S$GLB,, | Performed by: FAMILY MEDICINE

## 2024-10-07 PROCEDURE — 3008F BODY MASS INDEX DOCD: CPT | Mod: CPTII,S$GLB,, | Performed by: FAMILY MEDICINE

## 2024-10-07 PROCEDURE — 99999 PR PBB SHADOW E&M-EST. PATIENT-LVL V: CPT | Mod: PBBFAC,,, | Performed by: FAMILY MEDICINE

## 2024-10-07 RX ORDER — BENZONATATE 100 MG/1
200 CAPSULE ORAL 3 TIMES DAILY PRN
Qty: 30 CAPSULE | Refills: 1 | Status: SHIPPED | OUTPATIENT
Start: 2024-10-07

## 2024-10-07 RX ORDER — FLUTICASONE PROPIONATE 50 MCG
1 SPRAY, SUSPENSION (ML) NASAL DAILY
Qty: 18 G | Refills: 3 | Status: SHIPPED | OUTPATIENT
Start: 2024-10-07 | End: 2024-12-09

## 2024-10-07 NOTE — PROGRESS NOTES
Subjective:       Patient ID: Joyce Peterson is a 64 y.o. female.    Chief Complaint: Follow-up (Pt here for ultrasound results. )    65 y/o female with hx of asthma x 20 years stable on Symbicort, and occasional use of rescue inhaler, her for follow up of DVT as well which occurred after back sx  6/12/24 in the right peroneal , and has been on Eliquis 5 mg bid, since 7/3/24, deneis any bleeding from any source,pain in lower extrimity has resolved, months ago, pt is concerned about right leg swelling which has been present for over 4-6 months,   Also concerned about cough andd chest congestion as well as earache,more so right ear ache, denies fever, chills or body ache, feels tired and does not feel good    Never been smoker        Review of Systems    Objective:      Physical Exam  Vitals and nursing note reviewed.   Constitutional:       General: She is not in acute distress.     Appearance: Normal appearance. She is well-developed. She is not diaphoretic.   HENT:      Head: Normocephalic and atraumatic.      Right Ear: Tympanic membrane, ear canal and external ear normal.      Left Ear: Tympanic membrane, ear canal and external ear normal.      Nose: Nose normal.   Eyes:      General: Lids are normal.      Conjunctiva/sclera: Conjunctivae normal.   Neck:      Trachea: Trachea and phonation normal.   Cardiovascular:      Rate and Rhythm: Normal rate and regular rhythm.      Pulses: Normal pulses.      Heart sounds: Normal heart sounds.   Pulmonary:      Effort: Pulmonary effort is normal.      Breath sounds: Normal breath sounds.   Abdominal:      General: Bowel sounds are normal. There is no abdominal bruit.      Palpations: Abdomen is soft. There is no mass or pulsatile mass.   Musculoskeletal:         General: No deformity.      Cervical back: Full passive range of motion without pain, normal range of motion and neck supple.   Skin:     General: Skin is warm and dry.   Neurological:      Mental Status: She  is alert and oriented to person, place, and time.      Sensory: No sensory deficit.      Deep Tendon Reflexes: Reflexes are normal and symmetric.   Psychiatric:         Speech: Speech normal.         Behavior: Behavior normal. Behavior is cooperative.         Thought Content: Thought content normal.         Judgment: Judgment normal.         Assessment:       1. Seasonal allergic rhinitis due to pollen    2. Non-recurrent acute serous otitis media of right ear        Plan:       Joyce was seen today for follow-up.    Diagnoses and all orders for this visit:    Seasonal allergic rhinitis due to pollen    Non-recurrent acute serous otitis media of right ear    Pain in right leg    Acute deep vein thrombosis (DVT) of right peroneal vein    Other orders  -     fluticasone propionate (FLONASE) 50 mcg/actuation nasal spray; 1 spray (50 mcg total) by Each Nostril route once daily.  -     benzonatate (TESSALON PERLES) 100 MG capsule; Take 2 capsules (200 mg total) by mouth 3 (three) times daily as needed for Cough.      Since recent US shows resolution of DVT , will MANJINDER Eliquis   Right leg swelling of unknown etiology, pt is not on any new medication, doubt anything unusual going on      Pt is in rehab and active,after 3 months of back sx    Reassurance  Will follow up for this in 2-3 months

## 2024-10-08 ENCOUNTER — PATIENT MESSAGE (OUTPATIENT)
Dept: PAIN MEDICINE | Facility: CLINIC | Age: 64
End: 2024-10-08
Payer: MEDICARE

## 2024-10-08 DIAGNOSIS — M47.26 OSTEOARTHRITIS OF SPINE WITH RADICULOPATHY, LUMBAR REGION: ICD-10-CM

## 2024-10-08 DIAGNOSIS — M96.1 POSTLAMINECTOMY SYNDROME OF LUMBAR REGION: ICD-10-CM

## 2024-10-08 DIAGNOSIS — M79.7 FIBROMYALGIA: ICD-10-CM

## 2024-10-08 RX ORDER — PREGABALIN 150 MG/1
150 CAPSULE ORAL 3 TIMES DAILY
Qty: 90 CAPSULE | Refills: 2 | Status: SHIPPED | OUTPATIENT
Start: 2024-10-08

## 2024-10-08 RX ORDER — MORPHINE SULFATE 30 MG/1
30 TABLET, FILM COATED, EXTENDED RELEASE ORAL EVERY 12 HOURS
Qty: 60 TABLET | Refills: 0 | Status: SHIPPED | OUTPATIENT
Start: 2024-10-08

## 2024-10-08 NOTE — TELEPHONE ENCOUNTER
Patient requesting refill on MS CONTIN   Last office visit 7/25/24 3 month f/u on 10/29   shows last refill on 9/12/24  Patient does not have a pain contract on file with [facility]  Patient last UDS no uds on file was not consistent with current therapy

## 2024-10-10 ENCOUNTER — RESEARCH ENCOUNTER (OUTPATIENT)
Dept: GASTROENTEROLOGY | Facility: CLINIC | Age: 64
End: 2024-10-10

## 2024-10-10 ENCOUNTER — OFFICE VISIT (OUTPATIENT)
Dept: GASTROENTEROLOGY | Facility: CLINIC | Age: 64
End: 2024-10-10
Payer: MEDICARE

## 2024-10-10 VITALS
SYSTOLIC BLOOD PRESSURE: 113 MMHG | HEART RATE: 89 BPM | HEIGHT: 61 IN | BODY MASS INDEX: 27.64 KG/M2 | WEIGHT: 146.38 LBS | DIASTOLIC BLOOD PRESSURE: 70 MMHG

## 2024-10-10 DIAGNOSIS — K31.84 GASTROPARESIS: Primary | ICD-10-CM

## 2024-10-10 DIAGNOSIS — K21.9 GASTROESOPHAGEAL REFLUX DISEASE WITHOUT ESOPHAGITIS: ICD-10-CM

## 2024-10-10 DIAGNOSIS — K59.00 CONSTIPATION, UNSPECIFIED CONSTIPATION TYPE: ICD-10-CM

## 2024-10-10 PROCEDURE — 3008F BODY MASS INDEX DOCD: CPT | Mod: CPTII,S$GLB,, | Performed by: INTERNAL MEDICINE

## 2024-10-10 PROCEDURE — 1159F MED LIST DOCD IN RCRD: CPT | Mod: CPTII,S$GLB,, | Performed by: INTERNAL MEDICINE

## 2024-10-10 PROCEDURE — 3074F SYST BP LT 130 MM HG: CPT | Mod: CPTII,S$GLB,, | Performed by: INTERNAL MEDICINE

## 2024-10-10 PROCEDURE — 3078F DIAST BP <80 MM HG: CPT | Mod: CPTII,S$GLB,, | Performed by: INTERNAL MEDICINE

## 2024-10-10 PROCEDURE — 99999 PR PBB SHADOW E&M-EST. PATIENT-LVL IV: CPT | Mod: PBBFAC,,, | Performed by: INTERNAL MEDICINE

## 2024-10-10 PROCEDURE — 99214 OFFICE O/P EST MOD 30 MIN: CPT | Mod: S$GLB,,, | Performed by: INTERNAL MEDICINE

## 2024-10-10 PROCEDURE — 1160F RVW MEDS BY RX/DR IN RCRD: CPT | Mod: CPTII,S$GLB,, | Performed by: INTERNAL MEDICINE

## 2024-10-10 RX ORDER — PANTOPRAZOLE SODIUM 40 MG/1
40 TABLET, DELAYED RELEASE ORAL 2 TIMES DAILY
Qty: 60 TABLET | Refills: 3 | Status: SHIPPED | OUTPATIENT
Start: 2024-10-10

## 2024-10-10 NOTE — PROGRESS NOTES
Subjective:       Patient ID: Joyce Peterson is a 64 y.o. female.    Chief Complaint: GI Problem (gastroparesis)    GI Problem    Follow-up visit.  Joyce is on the compassionate propulsid trial for idiopathic gastroparesis.  And she has been on this and has been doing well on the propulsid.    She comes in today it has been 3 months since back surgery in 3 months since she was put on the opiates for the pain after the surgery.  And in the last month she has had a dramatic increase in the severity of her reflux.  She is having severe regurgitation episodes at night where she wakes up with pyrosis regurgitation followed by chest pain.  And she is being carefully she can be on her diet.  She has had more constipation than usual also.  Complains of constant bloating.  Worsening of her early satiety.  No nausea or vomiting.  Right now she is taking magnesium citrate every other night for her bowels.    Already taking a stool softener.  Sleeps on a hospital bed with the head of bed elevated.  And avoids dinner on many nights    Past Medical History:   Diagnosis Date    Acid reflux     Allergy     Anemia     Asthma     Coronary artery disease     CS (cervical spondylosis) 03/08/2013    Degenerative disc disease     Degenerative joint disease of hindfoot, left 6/28/2022    Dry eyes     Dry mouth     Gastroparesis     History of methotrexate therapy 01/19/2022    Hyperlipidemia     Lateral meniscus derangement 04/06/2016    Lobular carcinoma in situ     Lumbar spondylosis 03/08/2013    Osteoarthritis     Osteoporosis     Pes planovalgus, acquired, left 6/28/2022    Rheumatoid arthritis(714.0)     Rupture of left triceps tendon 10/17/2018    Umbilical hernia 08/13/2015       Review of patient's allergies indicates:   Allergen Reactions    Actemra [tocilizumab] Other (See Comments)     Severe dizziness    Codeine Nausea And Vomiting and Hives    Gold au 198 Hives and Rash    Hydroxychloroquine Other (See Comments)      "Can't remember the reaction      Iodinated contrast media Other (See Comments)     Other reaction(s): BURNING ALL OVER    Iodine Other (See Comments) and Hives     Other reaction(s): BURNING ALL OVER - Iodine dye - Not topical    Ondansetron Nausea And Vomiting    Sulfa (sulfonamide antibiotics) Other (See Comments)     Can't remember the reaction    Zofran [ondansetron hcl (pf)] Nausea And Vomiting     Pt reports that last time she received zofran she started vomiting again    Methotrexate analogues Nausea Only    Pneumococcal vaccine Other (See Comments)    Pneumovax 23 [pneumococcal 23-argenis ps vaccine] Other (See Comments)     "sick"    Methotrexate Nausea Only        Family History   Problem Relation Name Age of Onset    Cancer Mother 69         Lung Cancer    Emphysema Mother 69     Heart attack Mother 69     Alcohol abuse Mother 69     Cancer Father          Lung Cancer    Osteoarthritis Father      Lung cancer Father      Skin cancer Father      Alcohol abuse Father      Heart disease Brother 57     Heart attack Brother 57     Alcohol abuse Brother 57     Cirrhosis Brother 57     Osteoarthritis Paternal Aunt      Retinal detachment Maternal Aunt      No Known Problems Sister      No Known Problems Maternal Uncle      No Known Problems Paternal Uncle      No Known Problems Maternal Grandmother      No Known Problems Maternal Grandfather      No Known Problems Paternal Grandmother      No Known Problems Paternal Grandfather      Colon cancer Neg Hx      Esophageal cancer Neg Hx      Stomach cancer Neg Hx      Celiac disease Neg Hx      Diabetes Neg Hx      Thyroid disease Neg Hx      Amblyopia Neg Hx      Blindness Neg Hx      Cataracts Neg Hx      Glaucoma Neg Hx      Hypertension Neg Hx      Macular degeneration Neg Hx      Strabismus Neg Hx      Stroke Neg Hx         Social History     Tobacco Use    Smoking status: Never    Smokeless tobacco: Never   Substance Use Topics    Alcohol use: Yes     Comment: 2-3 " times per year.    Drug use: No        Review of Systems    CMP   Lab Results   Component Value Date     09/26/2024     07/02/2024    K 4.2 09/26/2024    K 4.1 07/17/2024    K 3.3 (L) 07/02/2024     09/26/2024     07/02/2024    CO2 26 09/26/2024    CO2 25 07/02/2024     09/26/2024     (H) 07/02/2024    BUN 11 09/26/2024    BUN 15 07/02/2024    CREATININE 0.7 09/26/2024    CREATININE 0.6 07/02/2024    CALCIUM 9.5 09/26/2024    CALCIUM 9.3 07/02/2024    PROT 5.9 (L) 09/26/2024    PROT 5.8 (L) 07/02/2024    ALBUMIN 3.6 09/26/2024    ALBUMIN 3.4 (L) 07/02/2024    BILITOT 0.3 09/26/2024    BILITOT 0.4 07/02/2024    ALKPHOS 120 09/26/2024    ALKPHOS 176 (H) 07/02/2024    AST 23 09/26/2024    AST 18 07/02/2024    ALT 19 09/26/2024    ALT 18 07/02/2024    ANIONGAP 9 09/26/2024    ANIONGAP 11 07/02/2024    and CBC   Lab Results   Component Value Date    WBC 3.72 (L) 09/26/2024    WBC 4.34 07/17/2024    WBC 3.77 (L) 07/02/2024    HGB 10.8 (L) 09/26/2024    HGB 10.7 (L) 07/17/2024    HGB 10.3 (L) 07/02/2024    HCT 34.1 (L) 09/26/2024     09/26/2024     07/17/2024     07/02/2024       No results found for this or any previous visit from the past 365 days.             Objective:      Physical Exam  Cardiovascular:      Rate and Rhythm: Normal rate and regular rhythm.   Pulmonary:      Effort: Pulmonary effort is normal.      Breath sounds: Normal breath sounds.   Abdominal:      General: Abdomen is flat. Bowel sounds are normal. There is no distension.      Tenderness: There is no abdominal tenderness.         Assessment & Plan:       Gastroparesis    Gastroesophageal reflux disease without esophagitis    Constipation, unspecified constipation type    Other orders  -     pantoprazole (PROTONIX) 40 MG tablet; Take 1 tablet (40 mg total) by mouth 2 (two) times daily.  Dispense: 60 tablet; Refill: 3       Assessment  1. Gastroparesis.  Right now worsened by the pain  medicines for her postop.  And that is making reflux symptoms worse.  We will continue with the cisapride.  She feels like she would derive benefit from taking it and would be much worse if she were not on it.  I have reviewed her EKG and the QT interval is unchanged.  I reviewed her labs and there was no contraindications she understands that she can not take grapefruit juice and that she should contact my office if there is any change in her medications gets many medications can interact with cisapride.    2. Gastroesophageal reflux her last endoscopy was 7 years ago and she did not have reflux esophagitis then.  But her symptoms are severe now probably by the pain medicine and the gastroparesis.  I am going to change her medicine from omeprazole which actually she is not supposed to be taking on propulsive anyway, and change to pantoprazole twice daily but she should continue all the conservative measures like very early dinner to no dinner.  Sleeping with head of bed elevated  3. Constipation.  I encouraged her to try taking MiraLax every day  4. Participation in research study    This note was created with voice recognition dictation technology.  There may be errors that I did not see, detect or correct.    López Moore MD

## 2024-10-10 NOTE — PROGRESS NOTES
Study: [id number]: Limited Access Protocol for the Use of Oral Cisapride in the Treatment of  Refractory Gastroesophageal Reflux Disease and Other Gastrointestinal Motility Disorders  Sponsor: [employer]  : López Moore MD  IRB # 2002.354.C  Subject ID# [id number]     The participant came in today for a follow up to continue on Cisapride. She states that the Cisapride is still helping. She c/o worsening reflux, so her omeprazole has been discontinued & Dr Moore has changed her to pantoprazole. Otherwise, nothing has changed with her medical history. She still qualifies to continue on Cisapride. Please see Dr Moore's notes for the physical exam.

## 2024-10-14 ENCOUNTER — PATIENT MESSAGE (OUTPATIENT)
Dept: FAMILY MEDICINE | Facility: CLINIC | Age: 64
End: 2024-10-14
Payer: MEDICARE

## 2024-10-15 DIAGNOSIS — M06.9 RHEUMATOID ARTHRITIS: ICD-10-CM

## 2024-10-16 ENCOUNTER — TELEPHONE (OUTPATIENT)
Dept: FAMILY MEDICINE | Facility: CLINIC | Age: 64
End: 2024-10-16
Payer: MEDICARE

## 2024-10-16 RX ORDER — AZITHROMYCIN 250 MG/1
TABLET, FILM COATED ORAL
Qty: 6 TABLET | Refills: 0 | Status: SHIPPED | OUTPATIENT
Start: 2024-10-16

## 2024-10-16 RX ORDER — FLUCONAZOLE 100 MG/1
100 TABLET ORAL DAILY
Qty: 14 TABLET | Refills: 3 | Status: SHIPPED | OUTPATIENT
Start: 2024-10-16 | End: 2024-11-15

## 2024-10-18 ENCOUNTER — TELEPHONE (OUTPATIENT)
Dept: FAMILY MEDICINE | Facility: CLINIC | Age: 64
End: 2024-10-18
Payer: MEDICARE

## 2024-10-18 ENCOUNTER — PATIENT MESSAGE (OUTPATIENT)
Dept: GASTROENTEROLOGY | Facility: CLINIC | Age: 64
End: 2024-10-18
Payer: MEDICARE

## 2024-10-18 DIAGNOSIS — I82.401 DEEP VEIN THROMBOSIS (DVT) OF RIGHT LOWER EXTREMITY, UNSPECIFIED CHRONICITY, UNSPECIFIED VEIN: Primary | ICD-10-CM

## 2024-10-18 DIAGNOSIS — I82.451 ACUTE EMBOLISM AND THROMBOSIS OF RIGHT PERONEAL VEIN: ICD-10-CM

## 2024-10-18 RX ORDER — LEFLUNOMIDE 20 MG/1
20 TABLET ORAL DAILY
Qty: 90 TABLET | Refills: 0 | Status: SHIPPED | OUTPATIENT
Start: 2024-10-18

## 2024-10-18 NOTE — TELEPHONE ENCOUNTER
----- Message from Monica Agustin sent at 6/13/2018  3:43 PM CDT -----  Contact: TONNY GOMEZ [[id number]]            Name of Who is Calling: TONNY GOMEZ [[id number]]    What is the request in detail: pt states she is on the way to pick elbow brace she will be here a little after 4pm    Can the clinic reply by [facility]: no    [facility] [phone]                                
3 = A little assistance

## 2024-10-18 NOTE — TELEPHONE ENCOUNTER
Called and spoke with pt in regards of TARA Zelaya's message. Pt verbalized understanding of message. Patient wants to know can she get the next DVT done of her right leg in January, so does it need to be done sooner than that? Please advise message.

## 2024-10-18 NOTE — TELEPHONE ENCOUNTER
----- Message from Zhane Zelaya NP sent at 10/18/2024  2:48 PM CDT -----  Please advise the patient I refilled her eliquis for 1 month. Per Dr. Treviño notes, she wanted a repeat DVT of the right leg. The order has been placed. Please schedule the patient.

## 2024-10-21 ENCOUNTER — PATIENT MESSAGE (OUTPATIENT)
Dept: PAIN MEDICINE | Facility: CLINIC | Age: 64
End: 2024-10-21
Payer: MEDICARE

## 2024-10-21 DIAGNOSIS — M47.26 OSTEOARTHRITIS OF SPINE WITH RADICULOPATHY, LUMBAR REGION: ICD-10-CM

## 2024-10-21 DIAGNOSIS — M79.18 MYOFASCIAL PAIN: ICD-10-CM

## 2024-10-21 DIAGNOSIS — M96.1 POSTLAMINECTOMY SYNDROME OF LUMBAR REGION: ICD-10-CM

## 2024-10-21 DIAGNOSIS — G57.03 PIRIFORMIS SYNDROME OF BOTH SIDES: ICD-10-CM

## 2024-10-21 RX ORDER — TIZANIDINE 4 MG/1
4 TABLET ORAL EVERY 6 HOURS PRN
Qty: 120 TABLET | Refills: 0 | Status: SHIPPED | OUTPATIENT
Start: 2024-10-21

## 2024-10-21 RX ORDER — MORPHINE SULFATE 30 MG/1
30 TABLET, FILM COATED, EXTENDED RELEASE ORAL EVERY 12 HOURS
Qty: 60 TABLET | Refills: 0 | Status: SHIPPED | OUTPATIENT
Start: 2024-10-29

## 2024-10-21 NOTE — TELEPHONE ENCOUNTER
Patient requesting refill on MS CONTIN   Last office visit 7/25/24 3m f/u on 10/29   shows last refill on 10/10/24  Patient does have a pain contract on file with [facility]  Patient last UDS no uds on file was not consistent with current therapy

## 2024-10-28 ENCOUNTER — PATIENT MESSAGE (OUTPATIENT)
Dept: HEPATOLOGY | Facility: CLINIC | Age: 64
End: 2024-10-28
Payer: MEDICARE

## 2024-10-29 ENCOUNTER — PATIENT MESSAGE (OUTPATIENT)
Dept: RHEUMATOLOGY | Facility: CLINIC | Age: 64
End: 2024-10-29
Payer: MEDICARE

## 2024-10-29 ENCOUNTER — OFFICE VISIT (OUTPATIENT)
Dept: PAIN MEDICINE | Facility: CLINIC | Age: 64
End: 2024-10-29
Attending: ANESTHESIOLOGY
Payer: MEDICARE

## 2024-10-29 VITALS
OXYGEN SATURATION: 98 % | WEIGHT: 146.38 LBS | HEART RATE: 77 BPM | RESPIRATION RATE: 18 BRPM | SYSTOLIC BLOOD PRESSURE: 133 MMHG | DIASTOLIC BLOOD PRESSURE: 78 MMHG | BODY MASS INDEX: 27.66 KG/M2 | TEMPERATURE: 99 F

## 2024-10-29 DIAGNOSIS — M06.9 RHEUMATOID ARTHRITIS, INVOLVING UNSPECIFIED SITE, UNSPECIFIED WHETHER RHEUMATOID FACTOR PRESENT: ICD-10-CM

## 2024-10-29 DIAGNOSIS — G89.4 CHRONIC PAIN DISORDER: ICD-10-CM

## 2024-10-29 DIAGNOSIS — G57.03 PIRIFORMIS SYNDROME OF BOTH SIDES: ICD-10-CM

## 2024-10-29 DIAGNOSIS — M43.06 SPONDYLOLYSIS OF LUMBAR REGION: ICD-10-CM

## 2024-10-29 DIAGNOSIS — M47.26 OSTEOARTHRITIS OF SPINE WITH RADICULOPATHY, LUMBAR REGION: ICD-10-CM

## 2024-10-29 DIAGNOSIS — M79.18 MYOFASCIAL PAIN: ICD-10-CM

## 2024-10-29 DIAGNOSIS — F11.90 CHRONIC, CONTINUOUS USE OF OPIOIDS: ICD-10-CM

## 2024-10-29 DIAGNOSIS — M96.1 POSTLAMINECTOMY SYNDROME OF LUMBAR REGION: Primary | ICD-10-CM

## 2024-10-29 DIAGNOSIS — Z98.1 S/P LUMBAR SPINAL FUSION: ICD-10-CM

## 2024-10-29 PROCEDURE — 3008F BODY MASS INDEX DOCD: CPT | Mod: CPTII,S$GLB,, | Performed by: NURSE PRACTITIONER

## 2024-10-29 PROCEDURE — 1159F MED LIST DOCD IN RCRD: CPT | Mod: CPTII,S$GLB,, | Performed by: NURSE PRACTITIONER

## 2024-10-29 PROCEDURE — 99999 PR PBB SHADOW E&M-EST. PATIENT-LVL V: CPT | Mod: PBBFAC,,, | Performed by: NURSE PRACTITIONER

## 2024-10-29 PROCEDURE — 1160F RVW MEDS BY RX/DR IN RCRD: CPT | Mod: CPTII,S$GLB,, | Performed by: NURSE PRACTITIONER

## 2024-10-29 PROCEDURE — 3078F DIAST BP <80 MM HG: CPT | Mod: CPTII,S$GLB,, | Performed by: NURSE PRACTITIONER

## 2024-10-29 PROCEDURE — 3075F SYST BP GE 130 - 139MM HG: CPT | Mod: CPTII,S$GLB,, | Performed by: NURSE PRACTITIONER

## 2024-10-29 PROCEDURE — 99214 OFFICE O/P EST MOD 30 MIN: CPT | Mod: S$GLB,,, | Performed by: NURSE PRACTITIONER

## 2024-10-29 RX ORDER — SARILUMAB 200 MG/1.14ML
200 INJECTION, SOLUTION SUBCUTANEOUS
Qty: 2.28 ML | Refills: 2 | Status: ACTIVE | OUTPATIENT
Start: 2024-10-29

## 2024-10-30 ENCOUNTER — PATIENT MESSAGE (OUTPATIENT)
Dept: RHEUMATOLOGY | Facility: CLINIC | Age: 64
End: 2024-10-30
Payer: MEDICARE

## 2024-10-31 ENCOUNTER — PATIENT MESSAGE (OUTPATIENT)
Dept: PAIN MEDICINE | Facility: CLINIC | Age: 64
End: 2024-10-31
Payer: MEDICARE

## 2024-10-31 ENCOUNTER — TELEPHONE (OUTPATIENT)
Dept: PAIN MEDICINE | Facility: CLINIC | Age: 64
End: 2024-10-31
Payer: MEDICARE

## 2024-10-31 NOTE — TELEPHONE ENCOUNTER
Staff called pt to verbalize to them that staff called pharmacy to verbalize to them that it was ok to refill prescription early per Valarie.

## 2024-11-04 ENCOUNTER — PATIENT MESSAGE (OUTPATIENT)
Dept: RHEUMATOLOGY | Facility: CLINIC | Age: 64
End: 2024-11-04
Payer: MEDICARE

## 2024-11-13 ENCOUNTER — PATIENT MESSAGE (OUTPATIENT)
Dept: UROLOGY | Facility: CLINIC | Age: 64
End: 2024-11-13
Payer: MEDICARE

## 2024-11-13 DIAGNOSIS — R39.15 URINARY URGENCY: Primary | ICD-10-CM

## 2024-11-15 ENCOUNTER — TELEPHONE (OUTPATIENT)
Dept: UROLOGY | Facility: CLINIC | Age: 64
End: 2024-11-15
Payer: MEDICARE

## 2024-11-15 DIAGNOSIS — R39.15 URINARY URGENCY: Primary | ICD-10-CM

## 2024-11-15 NOTE — TELEPHONE ENCOUNTER
----- Message from Anastasia Healy DNP sent at 11/15/2024  8:22 AM CST -----  Please inform patient via telephone that her urine cx showed some bacteria but none in predominance to diagnose a UTI. This means urine sample was contaminated. Repeat urine cx only if urinary symptoms are present and re-educate patient on clean catch mid-stream urine specimen collection. Thanks.

## 2024-11-19 ENCOUNTER — PATIENT MESSAGE (OUTPATIENT)
Dept: UROLOGY | Facility: CLINIC | Age: 64
End: 2024-11-19
Payer: MEDICARE

## 2024-11-20 ENCOUNTER — TELEPHONE (OUTPATIENT)
Dept: UROLOGY | Facility: CLINIC | Age: 64
End: 2024-11-20
Payer: MEDICARE

## 2024-11-20 NOTE — TELEPHONE ENCOUNTER
----- Message from Anastasia Healy DNP sent at 11/20/2024  2:33 PM CST -----  Please inform patient via telephone that her urine cx was normal. She does not have a UTI.

## 2024-11-21 DIAGNOSIS — E87.6 HYPOKALEMIA: ICD-10-CM

## 2024-11-21 NOTE — TELEPHONE ENCOUNTER
No care due was identified.  [facility] Embedded Care Due Messages. Reference number: [id number].   11/21/2024 2:43:43 PM CST

## 2024-11-22 ENCOUNTER — PATIENT MESSAGE (OUTPATIENT)
Dept: PAIN MEDICINE | Facility: CLINIC | Age: 64
End: 2024-11-22
Payer: MEDICARE

## 2024-11-22 ENCOUNTER — PATIENT MESSAGE (OUTPATIENT)
Dept: FAMILY MEDICINE | Facility: CLINIC | Age: 64
End: 2024-11-22
Payer: MEDICARE

## 2024-11-22 DIAGNOSIS — E87.6 HYPOKALEMIA: ICD-10-CM

## 2024-11-22 DIAGNOSIS — G57.03 PIRIFORMIS SYNDROME OF BOTH SIDES: ICD-10-CM

## 2024-11-22 DIAGNOSIS — M79.18 MYOFASCIAL PAIN: ICD-10-CM

## 2024-11-22 RX ORDER — POTASSIUM CHLORIDE 20 MEQ/1
20 TABLET, EXTENDED RELEASE ORAL DAILY
Qty: 90 TABLET | Refills: 0 | Status: SHIPPED | OUTPATIENT
Start: 2024-11-22

## 2024-11-22 RX ORDER — POTASSIUM CHLORIDE 20 MEQ/1
40 TABLET, EXTENDED RELEASE ORAL 2 TIMES DAILY
Qty: 360 TABLET | Refills: 2 | OUTPATIENT
Start: 2024-11-22

## 2024-11-22 RX ORDER — TIZANIDINE 4 MG/1
4 TABLET ORAL EVERY 6 HOURS PRN
Qty: 120 TABLET | Refills: 1 | Status: SHIPPED | OUTPATIENT
Start: 2024-11-22

## 2024-11-22 NOTE — TELEPHONE ENCOUNTER
Refill Routing Note   Medication(s) are not appropriate for processing by [facility] for the following reason(s):        Drug-disease interaction    [facility] action(s):  Defer             Pharmacist review requested: Yes   Extended chart review required: Yes     Appointments  past 12m or future 3m with PCP    Date Provider   Last Visit   7/22/2024 Krystal Singleton, DO   Next Visit   1/29/2025 Krystal Singleton, DO   ED visits in past 90 days: 0        Note composed:9:43 AM 11/22/2024

## 2024-11-22 NOTE — TELEPHONE ENCOUNTER
No care due was identified.  [facility] Embedded Care Due Messages. Reference number: [id number].   11/22/2024 1:46:24 PM CST

## 2024-11-22 NOTE — TELEPHONE ENCOUNTER
Refill Routing Note   Medication(s) are not appropriate for processing by [facility] for the following reason(s):     DDI not previously overridden by current provider--after initial override, the Refill Center will be able to continue overrides      Drug-disease interaction: potassium chloride SA and Candidal esophagitis; Gastroparesis    [facility] action(s):  Defer           Pharmacist review requested: Yes     Appointments  past 12m or future 3m with PCP    Date Provider   Last Visit   7/22/2024 Krystal Singleton, DO   Next Visit   1/29/2025 Krystal Singleton, DO   ED visits in past 90 days: 0        Note composed:9:25 PM 11/21/2024

## 2024-11-25 ENCOUNTER — PATIENT MESSAGE (OUTPATIENT)
Dept: PAIN MEDICINE | Facility: CLINIC | Age: 64
End: 2024-11-25
Payer: MEDICARE

## 2024-11-25 DIAGNOSIS — D50.0 IRON DEFICIENCY ANEMIA DUE TO CHRONIC BLOOD LOSS: Primary | ICD-10-CM

## 2024-11-26 ENCOUNTER — OFFICE VISIT (OUTPATIENT)
Dept: OTOLARYNGOLOGY | Facility: CLINIC | Age: 64
End: 2024-11-26
Payer: MEDICARE

## 2024-11-26 VITALS
SYSTOLIC BLOOD PRESSURE: 115 MMHG | HEART RATE: 90 BPM | DIASTOLIC BLOOD PRESSURE: 70 MMHG | HEIGHT: 61 IN | BODY MASS INDEX: 26.65 KG/M2 | WEIGHT: 141.13 LBS

## 2024-11-26 DIAGNOSIS — H92.11 OTORRHEA OF RIGHT EAR: Chronic | ICD-10-CM

## 2024-11-26 DIAGNOSIS — H60.63 CHRONIC OTITIS EXTERNA OF BOTH EARS, UNSPECIFIED TYPE: ICD-10-CM

## 2024-11-26 DIAGNOSIS — H61.91 LESION OF EXTERNAL EAR CANAL, RIGHT: Primary | Chronic | ICD-10-CM

## 2024-11-26 DIAGNOSIS — J31.0 CHRONIC RHINITIS: Chronic | ICD-10-CM

## 2024-11-26 PROCEDURE — 87102 FUNGUS ISOLATION CULTURE: CPT | Performed by: OTOLARYNGOLOGY

## 2024-11-26 PROCEDURE — 3078F DIAST BP <80 MM HG: CPT | Mod: CPTII,S$GLB,, | Performed by: OTOLARYNGOLOGY

## 2024-11-26 PROCEDURE — 87077 CULTURE AEROBIC IDENTIFY: CPT | Performed by: OTOLARYNGOLOGY

## 2024-11-26 PROCEDURE — 1160F RVW MEDS BY RX/DR IN RCRD: CPT | Mod: CPTII,S$GLB,, | Performed by: OTOLARYNGOLOGY

## 2024-11-26 PROCEDURE — 92504 EAR MICROSCOPY EXAMINATION: CPT | Mod: S$GLB,,, | Performed by: OTOLARYNGOLOGY

## 2024-11-26 PROCEDURE — 99214 OFFICE O/P EST MOD 30 MIN: CPT | Mod: 25,S$GLB,, | Performed by: OTOLARYNGOLOGY

## 2024-11-26 PROCEDURE — 1159F MED LIST DOCD IN RCRD: CPT | Mod: CPTII,S$GLB,, | Performed by: OTOLARYNGOLOGY

## 2024-11-26 PROCEDURE — 3008F BODY MASS INDEX DOCD: CPT | Mod: CPTII,S$GLB,, | Performed by: OTOLARYNGOLOGY

## 2024-11-26 PROCEDURE — 87070 CULTURE OTHR SPECIMN AEROBIC: CPT | Performed by: OTOLARYNGOLOGY

## 2024-11-26 PROCEDURE — 87075 CULTR BACTERIA EXCEPT BLOOD: CPT | Performed by: OTOLARYNGOLOGY

## 2024-11-26 PROCEDURE — 99999 PR PBB SHADOW E&M-EST. PATIENT-LVL IV: CPT | Mod: PBBFAC,,, | Performed by: OTOLARYNGOLOGY

## 2024-11-26 PROCEDURE — 87186 SC STD MICRODIL/AGAR DIL: CPT | Performed by: OTOLARYNGOLOGY

## 2024-11-26 PROCEDURE — 3074F SYST BP LT 130 MM HG: CPT | Mod: CPTII,S$GLB,, | Performed by: OTOLARYNGOLOGY

## 2024-11-26 RX ORDER — CIPROFLOXACIN AND DEXAMETHASONE 3; 1 MG/ML; MG/ML
4 SUSPENSION/ DROPS AURICULAR (OTIC) 2 TIMES DAILY
Qty: 7.5 ML | Refills: 0 | Status: SHIPPED | OUTPATIENT
Start: 2024-11-26 | End: 2024-12-09

## 2024-11-26 RX ORDER — TRIAMCINOLONE ACETONIDE 55 UG/1
2 SPRAY, METERED NASAL DAILY
Qty: 17 G | Refills: 5 | Status: SHIPPED | OUTPATIENT
Start: 2024-11-26

## 2024-11-26 RX ORDER — MINERAL OIL
180 ENEMA (ML) RECTAL DAILY
Qty: 90 TABLET | Refills: 3 | Status: SHIPPED | OUTPATIENT
Start: 2024-11-26 | End: 2025-11-26

## 2024-11-26 NOTE — PROGRESS NOTES
Chief Complaint   Patient presents with    Follow-up     Pt stated that her left ear feels fulness and throbbing       HPI:  Patient is a 64 y.o. female who has previously seen me for chronic rhinitis, chronic otitis externa, right ear canal lesion, previous sialoadenitis.  At last visit she was treated with Ciprodex otic drops for right ear canal inflammation, and mupirocin for left nasal vestibule inflammation and small lesion.  She presents today for follow-up to re-examine these areas and assess response to therapy.    Since the last visit, the patient reports the nasal crusting and irritation has improved though does occasionally flare up.  She used Ciprodex drops on the right ear for short time, but they were causing pain with use so she stopped.  She has not noticed any otorrhea or other ongoing issues with the right ear, though she occasionally has mild discomfort.  She does complain of purulent nasal secretions and thick postnasal drip over the last few weeks.  She has some sinus pressure and ear pressure as well.  She does report a history of allergic rhinitis symptoms in the past and has had some occasional sinusitis when allergies flare.      Interval HPI 11/26/2024 :      Patient reports she has not had any significant otorrhea from the right.  She still continues to use Q-tips in the ears and does notice some scant blood in the ear when she does this.  Previous cultures from last visit did not grow any bacteria or fungus.    She also reports mild increase in her rhinitis symptoms over the last few days.  She uses flonase and azelastine occasionally, and she uses zyrtec, but antihistamines cause significant drying of her eyes.        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    Thyroid nodule 08/22/2013    Hyperlipidemia 08/22/2013    Mild intermittent asthma without  complication 11/04/2015    Rheumatoid arthritis involving multiple sites 11/16/2016    Iron deficiency anemia due to chronic blood loss 11/14/2017    Sjogren's syndrome 05/21/2018    Candidal esophagitis 10/15/2018    Coronary artery disease involving native coronary artery of native heart without angina pectoris 07/16/2019    Subclinical hyperthyroidism 02/21/2020    Dry eyes     Ureterocele     Spinal stenosis, lumbar region without neurogenic claudication 07/23/2021    Impaired mobility and activities of daily living 10/05/2021    History of methotrexate therapy 01/19/2022    NAFLD (nonalcoholic fatty liver disease) 12/29/2022    Chronic pain 12/29/2022    Neuropathic pain of left foot 02/03/2023    Hypokalemia 06/24/2023    History of falling 06/29/2023    Arthrodesis status 06/29/2023    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 01/11/2024    Nausea 04/15/2024    Acute deep vein thrombosis (DVT) of right peroneal vein 07/22/2024    S/P lumbar fusion 07/31/2024     Resolved Ambulatory Problems     Diagnosis Date Noted    Osteoarthritis involving multiple joints on both sides of body 08/01/2012    Coronary artery disease 02/06/2013    Posterior tibial tendinitis of left leg 03/08/2013    Lumbar spondylosis 03/08/2013    CS (cervical spondylosis) 03/08/2013    Osteoarthritis of both knees 03/08/2013    Occipital neuralgia 08/13/2014    Osteoarthritis of CMC joint of thumb 11/13/2014    URTI (acute upper respiratory infection) 07/28/2015    Umbilical hernia 08/13/2015    CMC (carpometacarpal) synovitis 10/19/2015    AR (allergic rhinitis) 11/04/2015    Iron deficiency anemia 11/11/2015    Vomiting 11/23/2015    Viral gastroenteritis 11/23/2015    Sepsis 11/23/2015    SIRS due to infectious process with organ dysfunction 11/23/2015    Intractable vomiting with nausea 11/23/2015    Volume depletion, gastrointestinal loss 11/23/2015    Immunosuppressed status 03/03/2016    Lateral meniscus derangement 04/06/2016    Lateral  epicondylitis of right elbow 08/10/2016    Pleurisy 11/01/2016    Thoracic back pain 02/07/2017    Low back pain 02/07/2017    Carpal tunnel syndrome of right wrist 04/04/2017    Elevated parathyroid hormone 05/10/2017    Low TSH level 05/10/2017    Hypogammaglobulinemia 05/10/2017    Right carpal tunnel syndrome 05/29/2017    Iron deficiency anemia due to chronic blood loss 06/29/2017    Pain in right hand 06/30/2017    Pain in right elbow 06/30/2017    Range of motion deficit 06/30/2017    Decreased  strength of right hand 06/30/2017    Right shoulder pain 08/21/2017    Pure hypercholesterolemia 01/08/2018    Research study patient 04/30/2018    Left elbow pain 06/20/2018    Rupture of left triceps tendon 10/17/2018    Pain in left elbow 11/01/2018    Stiffness of left elbow joint 01/10/2019    Weakness of left arm 01/10/2019    Left hip pain 03/06/2019    Acute hip pain, left 03/20/2019    Gait abnormality 03/20/2019    Greater trochanteric bursitis of left hip 05/13/2019    Stress fracture of neck of left femur 05/20/2019    Left leg weakness 06/06/2019    Impaired functional mobility, balance, gait, and endurance 06/13/2019    Buttock pain 09/27/2019    Difficulty walking 12/05/2019    ROBERT (iron deficiency anemia) 01/24/2020    Fibromyalgia 03/12/2020    Recurrent UTI     Atrophic vaginitis     Drug-induced constipation     Dry mouth     Nocturia     Urinary frequency     Urge urinary incontinence     RADHA (stress urinary incontinence, female)     Elevated serum GGT level 03/17/2021    History of long term use of Methotrexate  03/17/2021    Transaminasemia 03/17/2021    Chronic pain 03/29/2021    Acute pain of left shoulder 04/26/2021    Osteoarthritis of lumbar spine 08/23/2021    Decreased range of motion with decreased strength 10/05/2021    Decreased strength of lower extremity 10/08/2021    Healthcare maintenance 01/18/2022    Abnormal finding of blood chemistry, unspecified  01/19/2022    Stress  fracture of left foot 06/28/2022    Primary osteoarthritis, left ankle and foot 06/28/2022    Pes planovalgus, acquired, left 06/28/2022    Degenerative joint disease of hindfoot, left 06/28/2022    Decreased strength, endurance, and mobility 07/05/2022    Impaired tolerance of activity 07/05/2022    Current chronic use of systemic steroids 12/29/2022    Hormone replacement therapy (HRT) 12/29/2022    Edema 12/29/2022    Leukopenia 12/29/2022    History of COVID-19 12/29/2022    Impaired gait and mobility 03/01/2023    Lumbar stenosis 06/23/2023    Lower extremity edema 06/23/2023    Cutaneous abscess of right knee 06/27/2023    Long term (current) use of inhaled steroids 06/29/2023    Encounter for other orthopedic aftercare 06/29/2023    Anemia, unspecified 06/29/2023    Age-related osteoporosis without current pathological fracture 06/29/2023    Long-term use of high-risk medication 11/07/2023     Past Medical History:   Diagnosis Date    Acid reflux     Allergy     Anemia     Asthma     Degenerative disc disease     Lobular carcinoma in situ     Osteoarthritis     Osteoporosis     Rheumatoid arthritis(714.0)        Review of Systems  General: negative for chills, fever or weight loss  Psychological: negative for mood changes or depression  Ophthalmic: negative for blurry vision, photophobia or eye pain  ENT: see HPI  Respiratory: no cough, shortness of breath, or wheezing  Cardiovascular: no chest pain or dyspnea on exertion  Gastrointestinal: no abdominal pain, change in bowel habits, or black/ bloody stools  Musculoskeletal: negative for gait disturbance or muscular weakness  Neurological: no syncope or seizures; no ataxia  Dermatological: negative for pruritis, rash and jaundice  Hematologic/lymphatic: no easy bruising, no new adenopathy    Physical Exam     Vitals:    11/26/24 1421   BP: 115/70   Pulse: 90         Constitutional:   She is oriented to person, place, and time. Vital signs are normal. She  appears well-developed and well-nourished. She appears alert. She is cooperative. Normal speech.      Head:  Normocephalic and atraumatic. Salivary glands normal.  Facial strength is normal.      Ears:    Right Ear: There is tenderness (mild tenderness in lateral portion EAC near where crusted area located). Tympanic membrane is not perforated, not erythematous and not retracted. No middle ear effusion.   Left Ear: No drainage. Tympanic membrane is not perforated, not erythematous and not retracted.  No middle ear effusion.   Ears:      Nose:  Mucosal edema and rhinorrhea present. No septal deviation or polyps. Turbinates abnormal and turbinate hypertrophy (3+, boggy, congested mucosa).  Right sinus exhibits no maxillary sinus tenderness and no frontal sinus tenderness. Left sinus exhibits no maxillary sinus tenderness and no frontal sinus tenderness.     Mouth/Throat  Oropharynx clear and moist without lesions or asymmetry, lips, teeth, and gums normal and oropharynx normal. No mucous membrane lesions. No oropharyngeal exudate, posterior oropharyngeal edema or posterior oropharyngeal erythema. Mirror exam not performed due to patient tolerance.  Mirror exam not performed due to patient tolerance.      Neck:  Neck normal without thyromegaly masses, asymmetry, normal tracheal structure, crepitus, and tenderness, thyroid normal, trachea normal, phonation normal, full range of motion with neck supple and no adenopathy. No JVD present. Carotid bruit is not present. No thyroid mass and no thyromegaly present.     She has no cervical adenopathy.     Cardiovascular:    Normal rate, regular rhythm and rate and rhythm, heart sounds, and pulses normal.              Pulmonary/Chest:   Effort and breath sounds normal.     Psychiatric:   She has a normal mood and affect. Her speech is normal and behavior is normal.     Neurological:   She is alert and oriented to person, place, and time. She has neurological normal, alert and  oriented. No cranial nerve deficit.     Skin:   No abrasions, lacerations, lesions, or rashes.     Binocular Microscopy    Indication:   Right ear canal lesion    Additional detail was required for the otoscopic examination of the right ear.  The operating microscope was used to directly visualize the tympanic membrane and middle ear landmarks.  Findings:  Canal skin:   Small area of crusting at the lateral EAC near bony cartilaginous junction, crusting on removed with purulent drainage noted below.  Skin mildly erythematous but intact, no bony exposure; culture taken of purulent drainage.   TM:  intact   Ossicles:  normal   Effusion:  none     The patient tolerated the procedure well.          Assessment/Plan:      ICD-10-CM ICD-9-CM    1. Lesion of external ear canal, right  H61.91 380.9       2. Otorrhea of right ear  H92.11 388.60 Aerobic culture      Culture, Anaerobic      Fungus culture      ciprofloxacin-dexAMETHasone 0.3-0.1% (CIPRODEX) 0.3-0.1 % DrpS      3. Chronic rhinitis  J31.0 472.0 triamcinolone (NASACORT) 55 mcg nasal inhaler      4. Chronic otitis externa of both ears, unspecified type  H60.63 380.23           Will follow-up culture results.    Start Ciprodex drops.    Stop Q-tip use.    Try Nasacort instead of Flonase nasal spray.  Okay to use azelastine, but would not combined with p.o. antihistamine to minimize drying effect.  Try Allegra instead of Zyrtec.        Love Whaley MD  [facility]

## 2024-11-26 NOTE — TELEPHONE ENCOUNTER
Patient requesting refill on MS CONTIN 15mg   Last office visit 10/29/24   shows last refill on 11/1/24  Patient does have a pain contract on file with [facility]  Patient last UDS no uds  was not consistent with current therapy    Changed her dosage from 30mg to 15mg per Valarie Law NP last note.

## 2024-11-26 NOTE — PATIENT INSTRUCTIONS
Try nasacort instead of flonase.    Use azelastine nasal spray as needed, but do not combine with antihistamines.      Change to allegra OTC instead of zyrtec.     Sent culture of right ear- will follow up results.   STOP qtips in right ear.   Use ciprodex ear drops twice daily in right ear.

## 2024-11-27 RX ORDER — MORPHINE SULFATE 15 MG/1
15 TABLET, FILM COATED, EXTENDED RELEASE ORAL 2 TIMES DAILY
Qty: 60 TABLET | Refills: 0 | Status: SHIPPED | OUTPATIENT
Start: 2024-11-27

## 2024-11-29 ENCOUNTER — PATIENT MESSAGE (OUTPATIENT)
Dept: OTOLARYNGOLOGY | Facility: CLINIC | Age: 64
End: 2024-11-29
Payer: MEDICARE

## 2024-11-29 DIAGNOSIS — M19.90 OSTEOARTHRITIS, UNSPECIFIED OSTEOARTHRITIS TYPE, UNSPECIFIED SITE: Primary | ICD-10-CM

## 2024-11-29 DIAGNOSIS — K31.84 GASTROPARESIS: ICD-10-CM

## 2024-11-29 LAB
BACTERIA SPEC AEROBE CULT: ABNORMAL
BACTERIA SPEC ANAEROBE CULT: NORMAL

## 2024-11-30 RX ORDER — NAPROXEN 500 MG/1
500 TABLET ORAL 2 TIMES DAILY PRN
Qty: 180 TABLET | Refills: 0 | Status: SHIPPED | OUTPATIENT
Start: 2024-11-30

## 2024-12-01 RX ORDER — SUCRALFATE 1 G/1
1 TABLET ORAL 4 TIMES DAILY
Qty: 360 TABLET | Refills: 2 | Status: SHIPPED | OUTPATIENT
Start: 2024-12-01

## 2024-12-03 ENCOUNTER — PATIENT MESSAGE (OUTPATIENT)
Dept: RHEUMATOLOGY | Facility: CLINIC | Age: 64
End: 2024-12-03
Payer: MEDICARE

## 2024-12-04 ENCOUNTER — TELEPHONE (OUTPATIENT)
Dept: OTOLARYNGOLOGY | Facility: CLINIC | Age: 64
End: 2024-12-04
Payer: MEDICARE

## 2024-12-04 NOTE — TELEPHONE ENCOUNTER
Returned call. Explained that we use [facility] out of [place] since they are able to compound the specific ingredients Dr. Whaley is requesting. Pt verbalized understanding and stated that she will go ahead and have the prescription filled.     ----- Message from Shira sent at 12/4/2024  9:55 AM CST -----  Type: Needs Medical Advice  Who Called:  pt  Symptoms (please be specific):  pt said she need to speak to the nurse--said it's about her ear drops that was given to her--said it was sent to the wrong pharmacy and they called her and said it need to go to a compound pharmacy-- said she need to know why it wasn't sent to [facility] in [place]--please call and advise  Best Call Back Number: [phone] (home)     Additional Information: thank you

## 2024-12-06 ENCOUNTER — OFFICE VISIT (OUTPATIENT)
Dept: OPTOMETRY | Facility: CLINIC | Age: 64
End: 2024-12-06
Payer: MEDICARE

## 2024-12-06 DIAGNOSIS — D84.9 IMMUNOCOMPROMISED: Primary | ICD-10-CM

## 2024-12-06 DIAGNOSIS — H25.13 NUCLEAR SCLEROSIS OF BOTH EYES: ICD-10-CM

## 2024-12-06 DIAGNOSIS — H04.123 BILATERAL DRY EYES: Primary | ICD-10-CM

## 2024-12-06 DIAGNOSIS — Z13.5 SCREENING FOR EYE CONDITION: ICD-10-CM

## 2024-12-06 DIAGNOSIS — H10.13 ALLERGIC CONJUNCTIVITIS OF BOTH EYES: ICD-10-CM

## 2024-12-06 PROCEDURE — 99999 PR PBB SHADOW E&M-EST. PATIENT-LVL III: CPT | Mod: PBBFAC,,, | Performed by: OPTOMETRIST

## 2024-12-06 RX ORDER — INFLUENZA VACCINE, ADJUVANTED 15; 15; 15; 15 UG/.5ML; UG/.5ML; UG/.5ML; UG/.5ML
60 INJECTION, SUSPENSION INTRAMUSCULAR ONCE
Qty: 0.5 ML | Refills: 0 | Status: SHIPPED | OUTPATIENT
Start: 2024-12-06 | End: 2024-12-06

## 2024-12-06 RX ORDER — FLUOROMETHOLONE 1 MG/ML
1 SUSPENSION/ DROPS OPHTHALMIC 4 TIMES DAILY
Qty: 10 ML | Refills: 3 | Status: SHIPPED | OUTPATIENT
Start: 2024-12-06 | End: 2024-12-31

## 2024-12-06 NOTE — PROGRESS NOTES
HPI    CC: Pt is here today for a Routine exam. She states she feels like she is   seeing well with her current glasses. She does have severe allergies in   her eyes so would like to see the doctor first before dilation. Her eyes   have been very dry and irritated.  JOSEFINA: 08/2023 Dr. Jurado    (-) Changes in vision   (+) Pain  (+) Irritation   (+) Itching   (-) Flashes  (-) Floaters  (+) Glasses wearer  (-) CL wearer  (+) Uses eye gtts Serum Tears, Restasis, Xiidra    Does patient want a refraction today?     (-) Eye injury  (-) Eye surgery   (-)POHx  (-)FOHx    (-)DM  Hemoglobin A1C       Date                     Value               Ref Range             Status                03/15/2022               5.2                 4.0 - 5.6 %           Final                 09/28/2015               5.6                 4.5 - 6.2 %           Final                   Last edited by Bryn Barbosa on 12/6/2024 11:03 AM.            Assessment /Plan     For exam results, see Encounter Report.    Bilateral dry eyes    Allergic conjunctivitis of both eyes    Nuclear sclerosis of both eyes    Screening for eye condition    Other orders  -     fluorometholone 0.1% (FML) 0.1 % DrpS; Place 1 drop into both eyes 4 (four) times daily. for 10 days  Dispense: 10 mL; Refill: 3      MONITOR. ED PT ON ALL EXAM FINDINGS  HOLD SPECS RTC FOR REFRACTION ONCE ALLERGIES/DRYNESS S/S IMPROVE   OCULAR HEALTH STABLE OD, OS   DISCUSSED DRY EYE THERAPIES INCLUDING PLUGS, AM'S, IPL, ETC.; RX FML QID OU W/ TAPER DISCUSSED ; MONITOR. AT'S PRN; COOL COMPRESSES;   REFER TO DR. TALAVERA FOR DRY EYE CONSULT; PREVIOUS PT; LOST TO F/U    MILD NS OU; PRESURGICAL; MONITOR. UV PROTECTION  RTC 1 YR//PRN FOR REE/DFE

## 2024-12-09 RX ORDER — OMEPRAZOLE AND SODIUM BICARBONATE 40; 1100 MG/1; MG/1
CAPSULE ORAL
Qty: 90 CAPSULE | Refills: 3 | Status: SHIPPED | OUTPATIENT
Start: 2024-12-09

## 2024-12-12 ENCOUNTER — TELEPHONE (OUTPATIENT)
Dept: HEMATOLOGY/ONCOLOGY | Facility: CLINIC | Age: 64
End: 2024-12-12
Payer: MEDICARE

## 2024-12-12 NOTE — TELEPHONE ENCOUNTER
----- Message from Lexie sent at 12/12/2024  9:09 AM CST -----  Regarding: Consult/Advisory  Contact: [phone]  Consult/Advisory     Name Of Caller: Pt       [phone]  Contact Preference:       Nature of call: Would like to know why she is scheduled with Jodie Jara, CLIFP-C, I was unable to send her a pool message. Please call to advise thank you

## 2024-12-12 NOTE — TELEPHONE ENCOUNTER
Spoke with  and advised that she is scheduled with Jodie Jara NP due to  leaving [facility] at the end of December. Pt verbalized agreement and understanding.

## 2024-12-13 ENCOUNTER — PATIENT MESSAGE (OUTPATIENT)
Dept: PAIN MEDICINE | Facility: CLINIC | Age: 64
End: 2024-12-13
Payer: MEDICARE

## 2024-12-13 DIAGNOSIS — D84.9 IMMUNOCOMPROMISED: Primary | ICD-10-CM

## 2024-12-13 DIAGNOSIS — G89.4 CHRONIC PAIN DISORDER: Primary | ICD-10-CM

## 2024-12-13 RX ORDER — PREDNISONE 5 MG/1
5 TABLET ORAL DAILY
Qty: 90 TABLET | Refills: 1 | Status: SHIPPED | OUTPATIENT
Start: 2024-12-13

## 2024-12-13 RX ORDER — PREDNISONE 1 MG/1
2 TABLET ORAL DAILY
Qty: 180 TABLET | Refills: 1 | Status: SHIPPED | OUTPATIENT
Start: 2024-12-13

## 2024-12-13 RX ORDER — INFLUENZA VACCINE, ADJUVANTED 15; 15; 15; 15 UG/.5ML; UG/.5ML; UG/.5ML; UG/.5ML
60 INJECTION, SUSPENSION INTRAMUSCULAR ONCE
Qty: 0.5 ML | Refills: 0 | Status: SHIPPED | OUTPATIENT
Start: 2024-12-13 | End: 2024-12-13

## 2024-12-13 NOTE — TELEPHONE ENCOUNTER
Patient requesting refill on MS CONTIN  Last office visit 7/25/24   shows last refill on 11/29/24  Patient does not have a pain contract on file with [facility]  Patient last UDS no uds  was not consistent with current therapy

## 2024-12-16 ENCOUNTER — PATIENT MESSAGE (OUTPATIENT)
Dept: HEMATOLOGY/ONCOLOGY | Facility: CLINIC | Age: 64
End: 2024-12-16
Payer: MEDICARE

## 2024-12-16 ENCOUNTER — PATIENT MESSAGE (OUTPATIENT)
Dept: OPTOMETRY | Facility: CLINIC | Age: 64
End: 2024-12-16
Payer: MEDICARE

## 2024-12-16 DIAGNOSIS — J45.20 MILD INTERMITTENT ASTHMA WITHOUT COMPLICATION: ICD-10-CM

## 2024-12-16 DIAGNOSIS — J32.9 SINUSITIS, UNSPECIFIED CHRONICITY, UNSPECIFIED LOCATION: ICD-10-CM

## 2024-12-16 RX ORDER — MORPHINE SULFATE 15 MG/1
15 TABLET, FILM COATED, EXTENDED RELEASE ORAL 2 TIMES DAILY
Qty: 60 TABLET | Refills: 0 | Status: SHIPPED | OUTPATIENT
Start: 2024-12-28

## 2024-12-16 RX ORDER — BENZONATATE 100 MG/1
100 CAPSULE ORAL 3 TIMES DAILY PRN
Qty: 30 CAPSULE | Refills: 0 | Status: SHIPPED | OUTPATIENT
Start: 2024-12-16 | End: 2024-12-30

## 2024-12-26 ENCOUNTER — PATIENT MESSAGE (OUTPATIENT)
Dept: PAIN MEDICINE | Facility: CLINIC | Age: 64
End: 2024-12-26
Payer: MEDICARE

## 2024-12-26 DIAGNOSIS — G89.4 CHRONIC PAIN DISORDER: ICD-10-CM

## 2024-12-27 NOTE — TELEPHONE ENCOUNTER
Patient requesting refill on Morphine  Last office visit 10-   shows last refill on 11-  Patient does have a pain contract on file with [facility] department

## 2024-12-28 RX ORDER — MORPHINE SULFATE 15 MG/1
15 TABLET, FILM COATED, EXTENDED RELEASE ORAL 2 TIMES DAILY
Qty: 60 TABLET | Refills: 0 | Status: SHIPPED | OUTPATIENT
Start: 2024-12-28

## 2024-12-30 DIAGNOSIS — E53.8 DEFICIENCY OF OTHER SPECIFIED B GROUP VITAMINS: ICD-10-CM

## 2024-12-30 DIAGNOSIS — D64.9 NORMOCYTIC ANEMIA: Primary | ICD-10-CM

## 2024-12-30 NOTE — PROGRESS NOTES
Subjective:      Patient ID: Joyce Peterson is a 64 y.o. female.    Chief Complaint: No chief complaint on file.      HPI:      Initial History:  63 y.o. female Referred to me November 2017  for ROBERT.  Intolerant to po iron.  Iron studies in march 2017 with severe ID.  History RA followed by Dr. Valverde.  History of gastroparesis followed by Dr. Moore in GI.  Denies fever, chills, nightsweats, bleeding, brusing, lymphadenopathy, signs/symptoms of splenomegaly.   Had upper and lower endoscopy summer 2017 unremarkable.  No VCE done.  Denies any overt GI bleeding or other bleeding.    S/p repeat IV feraheme x 2 January 2018,  may 2019 and oct/nov 2019.  Tolerated well.   Maintained ferritin and hgb since that time. Continues close fu with rheum for RA and osteoperosis.       Interval History 3/21/24:     Ms. Peterson presentsfor surveillance fu for ROBERT.    Some fatigue. No bleeding of any kind.      physical exam deferred due to telemed     Interval History     Ms. Peterson returns for ROBERT surveillance.   I have reviewed all of the patient's relevant lab work available in the medical record and have utilized this in my evaluation and management recommendations today.      Social History     Socioeconomic History    Marital status:    Tobacco Use    Smoking status: Never    Smokeless tobacco: Never   Substance and Sexual Activity    Alcohol use: Yes     Comment: 2-3 times per year.    Drug use: No    Sexual activity: Yes     Partners: Male     Birth control/protection: Surgical     Social Drivers of Health     Financial Resource Strain: Low Risk  (6/12/2024)    Received from [facility]    Overall Financial Resource Strain (CARDIA)     Difficulty of Paying Living Expenses: Not hard at all   Food Insecurity: No Food Insecurity (6/12/2024)    Received from [facility]    Hunger Vital Sign     Worried About Running Out of Food in the Last Year: Never true     Ran Out of Food in the Last Year: Never true    Transportation Needs: No Transportation Needs (6/12/2024)    Received from [facility]    PRAPARE - Transportation     Lack of Transportation (Medical): No     Lack of Transportation (Non-Medical): No   Physical Activity: Patient Unable To Answer (6/12/2024)    Received from [facility]    Exercise Vital Sign     Days of Exercise per Week: Patient unable to answer     Minutes of Exercise per Session: Patient unable to answer   Stress: No Stress Concern Present (6/12/2024)    Received from [facility]    [employer] - Occupational Stress Questionnaire     Feeling of Stress : Not at all   Housing Stability: Unknown (12/5/2023)    Housing Stability Vital Sign     Unable to Pay for Housing in the Last Year: Patient refused     Number of Places Lived in the Last Year: 1     Unstable Housing in the Last Year: No       Family History   Problem Relation Name Age of Onset    Cancer Mother 69         Lung Cancer    Emphysema Mother 69     Heart attack Mother 69     Alcohol abuse Mother 69     Cancer Father          Lung Cancer    Osteoarthritis Father      Lung cancer Father      Skin cancer Father      Alcohol abuse Father      Heart disease Brother 57     Heart attack Brother 57     Alcohol abuse Brother 57     Cirrhosis Brother 57     Osteoarthritis Paternal Aunt      Retinal detachment Maternal Aunt      No Known Problems Sister      No Known Problems Maternal Uncle      No Known Problems Paternal Uncle      No Known Problems Maternal Grandmother      No Known Problems Maternal Grandfather      No Known Problems Paternal Grandmother      No Known Problems Paternal Grandfather      Colon cancer Neg Hx      Esophageal cancer Neg Hx      Stomach cancer Neg Hx      Celiac disease Neg Hx      Diabetes Neg Hx      Thyroid disease Neg Hx      Amblyopia Neg Hx      Blindness Neg Hx      Cataracts Neg Hx      Glaucoma Neg Hx      Hypertension Neg Hx      Macular degeneration Neg Hx      Strabismus  Neg Hx      Stroke Neg Hx         Past Surgical History:   Procedure Laterality Date    BACK SURGERY  06/12/2024    revision of prior back surgery on 07/14/2023    BREAST BIOPSY Left 01/29/2002    core bx    CARPAL TUNNEL RELEASE Right 05/2017    CHOLECYSTECTOMY  2004    COLONOSCOPY      10/11    COLONOSCOPY N/A 06/29/2017    Procedure: COLONOSCOPY;  Surgeon: López Moore MD;  Location: [facility] (4TH FLR);  Service: Endoscopy;  Laterality: N/A;    EPIDURAL STEROID INJECTION N/A 11/18/2021    Procedure: INJECTION, STEROID, EPIDURAL, L5-S1 IL NEED CONSENT;  Surgeon: Tj Mayfield MD;  Location: [facility] PAIN MGT;  Service: Pain Management;  Laterality: N/A;    EPIDURAL STEROID INJECTION N/A 09/29/2022    Procedure: LUMBAR L3/L4 IL MADELINE CONTRAST;  Surgeon: Tj Mayfield MD;  Location: [facility] PAIN MGT;  Service: Pain Management;  Laterality: N/A;    EPIDURAL STEROID INJECTION N/A 12/12/2022    Procedure: INJECTION, STEROID, EPIDURAL L5/S1 IL CONTRAST;  Surgeon: Tj Mayfield MD;  Location: [facility] PAIN MGT;  Service: Pain Management;  Laterality: N/A;    EPIDURAL STEROID INJECTION N/A 04/06/2023    Procedure: INJECTION, STEROID, EPIDURAL L5/S1;  Surgeon: Tj Mayfield MD;  Location: [facility] PAIN MGT;  Service: Pain Management;  Laterality: N/A;    FOOT ARTHRODESIS Left 01/11/2023    Procedure: FUSION, FOOT;  Surgeon: Ariella Finnegan MD;  Location: [facility] OR;  Service: Orthopedics;  Laterality: Left;  nimbus    FUSION, SPINE, LUMBAR, TLIF, POSTERIOR APPROACH, USING PEDICLE SCREW N/A 06/22/2023    Procedure: FUSION, SPINE, LUMBAR, TLIF, POSTERIOR APPROACH, USING PEDICLE SCREW L4/5 spinewave SNS:SSEP/EMG;  Surgeon: Jh Mosqueda MD;  Location: [facility] OR 2ND FLR;  Service: Neurosurgery;  Laterality: N/A;    HARVESTING OF BONE GRAFT Left 01/11/2023    Procedure: SURGICAL PROCUREMENT, BONE GRAFT;  Surgeon: Ariella Finnegan MD;  Location: [facility] OR;  Service: Orthopedics;  Laterality: Left;    INJECTION OF ANESTHETIC AGENT AROUND NERVE Bilateral  08/23/2021    Procedure: BLOCK, NERVE, MEDIAL BRANCH L3,L4,L5;  Surgeon: Tj Mayfield MD;  Location: [facility]IN [facility]GT;  Service: Pain Management;  Laterality: Bilateral;  1 of 2    INJECTION OF ANESTHETIC AGENT AROUND NERVE Bilateral 08/26/2021    Procedure: BLOCK, NERVE, MEDIAL BRANCH L3,L4,L5;  Surgeon: Tj Mayfield MD;  Location: [facility] PAIN [facility]GT;  Service: Pain Management;  Laterality: Bilateral;  2 of 2    INJECTION OF ANESTHETIC AGENT AROUND NERVE Left 07/07/2022    Procedure: BLOCK, NERVE, LEFT OBTURATOR AND FEMORAL;  Surgeon: Tj Mayfield MD;  Location: [facility];  Service: Pain Management;  Laterality: Left;    INJECTION OF FACET JOINT Bilateral 03/12/2020    Procedure: FACET JOINT INJECTION (LUMBAR BLOCK) BILATERAL L4-5 AND L5-S1 DIRECT REFERRAL;  Surgeon: Tj Mayfield MD;  Location: [facility];  Service: Pain Management;  Laterality: Bilateral;  NEEDS CONSENT    INJECTION OF FACET JOINT Bilateral 03/29/2021    Procedure: INJECTION, FACET JOINT L3/4, L4/5, L5/S1;  Surgeon: Tj Mayfield MD;  Location: [facility];  Service: Pain Management;  Laterality: Bilateral;    INJECTION OF JOINT Right 07/30/2020    Procedure: INJECTION, JOINT, RIGHT HIP Interarticular under flouro;  Surgeon: Tj Mayfield MD;  Location: [facility];  Service: Pain Management;  Laterality: Right;  INJECTION, JOINT, RIGHT HIP Interarticular under flouro    INJECTION OF JOINT Right 02/21/2022    Procedure: Injection, Joint RIGHT ISCHIAL BURSA;  Surgeon: Tj Mayfield MD;  Location: [facility];  Service: Pain Management;  Laterality: Right;    INTRAMEDULLARY RODDING OF FEMUR Left 05/21/2019    Procedure: INSERTION, INTRAMEDULLARY ARIEL, FEMUR;  Surgeon: López Duff MD;  Location: [facility] OR [address] [place] [address];  Service: Orthopedics;  Laterality: Left;    LENGTHENING OF TENDON OF LOWER EXTREMITY Left 01/11/2023    Procedure: LENGTHENING, TENDON, LOWER EXTREMITY;  Surgeon: Ariella Finnegan MD;  Location: [facility];  Service: Orthopedics;   Laterality: Left;    MAGNETIC RESONANCE IMAGING N/A 05/29/2023    Procedure: MRI (Magnetic Resonance Imagine);  Surgeon: Sujey Surgeon;  Location: [facility];  Service: Anesthesiology;  Laterality: N/A;    MAGNETIC RESONANCE IMAGING N/A 01/10/2024    Procedure: MRI (Magnetic Resonance Imagine);  Surgeon: Sujey Robin;  Location: [facility];  Service: Anesthesiology;  Laterality: N/A;    RADIOFREQUENCY ABLATION Left 09/20/2021    Procedure: RADIOFREQUENCY ABLATION left L3,4,5 RFA  1 of 2  CONSENT NEEDED;  Surgeon: Tj Mayfield MD;  Location: [facility] PAIN [facility]GT;  Service: Pain Management;  Laterality: Left;    RADIOFREQUENCY ABLATION Right 10/04/2021    Procedure: RADIOFREQUENCY ABLATION  right L3,4,5 RFA   2 of 2  CONSENT NEEDED;  Surgeon: Tj Mayfield MD;  Location: [facility] PAIN [facility]GT;  Service: Pain Management;  Laterality: Right;    RADIOFREQUENCY ABLATION Left 04/21/2022    Procedure: Radiofrequency Ablation LEFT L3,L4,L5;  Surgeon: Tj Mayfield MD;  Location: [facility]IN [facility]GT;  Service: Pain Management;  Laterality: Left;    RADIOFREQUENCY ABLATION Right 05/12/2022    Procedure: Radiofrequency Ablation RIGHT L3,L4,L5;  Surgeon: Tj Mayfield MD;  Location: [facility]IN [facility]GT;  Service: Pain Management;  Laterality: Right;    RADIOFREQUENCY ABLATION Left 07/25/2022    Procedure: Radiofrequency Ablation Left Femoral & Obturator;  Surgeon: Tj Mayfield MD;  Location: [facility] PAIN [facility]GT;  Service: Pain Management;  Laterality: Left;    REPAIR OF TRICEPS TENDON Left 10/17/2018    Procedure: REPAIR, TENDON, TRICEPS left elbow;  Surgeon: Staci Yarbrough MD;  Location: [facility] OR [address] [place] [address];  Service: Orthopedics;  Laterality: Left;  Anesthesia: General and regional. PRONE, k-wire , hand pan 1 and pan 2, CALL ARTHREX/Tamera notified 10-12 LO    TONSILLECTOMY      TRANSFORAMINAL EPIDURAL INJECTION OF STEROID Right 08/31/2020    Procedure: INJECTION, STEROID, EPIDURAL, TRANSFORAMINAL,  APPROACH, L3-L4 and L4-L5 need consent;  Surgeon: Tj  MD Tran;  Location: [facility]IN [facility]GT;  Service: Pain Management;  Laterality: Right;    TRANSFORAMINAL EPIDURAL INJECTION OF STEROID Bilateral 07/23/2021    Procedure: INJECTION, STEROID, EPIDURAL, TRANSFORAMINAL APPROACH L4/5;  Surgeon: Tj Mayfield MD;  Location: [facility]IN [facility]GT;  Service: Pain Management;  Laterality: Bilateral;    TRANSFORAMINAL EPIDURAL INJECTION OF STEROID Bilateral 09/25/2023    Procedure: LUMBAR TRANSFORAMINAL BILATERAL  L2/3 DIRECT REFERRAL;  Surgeon: Tj Mayfield MD;  Location: [facility]IN [facility];  Service: Pain Management;  Laterality: Bilateral;    TRANSFORAMINAL EPIDURAL INJECTION OF STEROID Left 10/18/2023    Procedure: LUMBAR TRANSFORAMINAL LEFT L3/4 AND L4/5 * CLEARANCE IN CHART*;  Surgeon: jT Mayfield MD;  Location: [facility]IN [facility]GT;  Service: Pain Management;  Laterality: Left;    TRIGGER POINT INJECTION N/A 07/23/2021    Procedure: INJECTION, TRIGGER POINT SCAPULAR;  Surgeon: Tj Mayfield MD;  Location: [facility];  Service: Pain Management;  Laterality: N/A;    TUBAL LIGATION  2003    UPPER GASTROINTESTINAL ENDOSCOPY      10/11    uterine ablation  2003       Past Medical History:   Diagnosis Date    Acid reflux     Allergy     Anemia     Asthma     Coronary artery disease     CS (cervical spondylosis) 03/08/2013    Degenerative disc disease     Degenerative joint disease of hindfoot, left 6/28/2022    Dry eyes     Dry mouth     Gastroparesis     History of methotrexate therapy 01/19/2022    Hyperlipidemia     Lateral meniscus derangement 04/06/2016    Lobular carcinoma in situ     Lumbar spondylosis 03/08/2013    Osteoarthritis     Osteoporosis     Pes planovalgus, acquired, left 6/28/2022    Rheumatoid arthritis(714.0)     Rupture of left triceps tendon 10/17/2018    Umbilical hernia 08/13/2015       Review of Systems       Medication List with Changes/Refills   Current Medications    ALBUTEROL (PROVENTIL/VENTOLIN HFA) 90 MCG/ACTUATION INHALER    Inhale 2 puffs into the lungs  "every 6 (six) hours as needed for Wheezing. Rescue    ALBUTEROL-IPRATROPIUM (DUO-NEB) 2.5 MG-0.5 MG/3 ML NEBULIZER SOLUTION    Take 3 mLs by nebulization every 6 (six) hours as needed for Wheezing. Rescue    APIXABAN (ELIQUIS) 5 MG TAB    Take 1 tablet (5 mg total) by mouth 2 (two) times daily.    AZITHROMYCIN (ZITHROMAX Z-DEVON) 250 MG TABLET    Take 2 tablets (500 mg) on  Day 1,  followed by 1 tablet (250 mg) once daily on Days 2 through 5.    BD ULTRA-FINE JAIME PEN NEEDLE 32 GAUGE X 5/32" NDLE    For Forteo - inject daily    BENZONATATE (TESSALON PERLES) 100 MG CAPSULE    Take 2 capsules (200 mg total) by mouth 3 (three) times daily as needed for Cough.    BENZONATATE (TESSALON) 100 MG CAPSULE    Take 1 capsule (100 mg total) by mouth 3 (three) times daily as needed.    BUDESONIDE-FORMOTEROL 160-4.5 MCG (SYMBICORT) 160-4.5 MCG/ACTUATION HFAA    Inhale 2 puffs into the lungs every 12 (twelve) hours.    CALCIUM CITRATE-VITAMIN D3 315-200 MG (CITRACAL+D) 315 MG-5 MCG (200 UNIT) PER TABLET    Take 1 tablet by mouth 2 (two) times daily.     CYCLOSPORINE (RESTASIS) 0.05 % OPHTHALMIC EMULSION    Place 1 drop into both eyes 2 (two) times daily.    DICLOFENAC SODIUM (VOLTAREN) 1 % GEL    APPLY 2 GRAMS TOPICALLY 4 (FOUR) TIMES DAILY AS NEEDED.    FEXOFENADINE (ALLEGRA) 180 MG TABLET    Take 1 tablet (180 mg total) by mouth once daily.    FLUCONAZOLE (DIFLUCAN) 100 MG TABLET    Take 1 tablet (100 mg total) by mouth once daily.    FLUCONAZOLE (DIFLUCAN) 150 MG TAB    Take 150 mg by mouth as needed.    FLUOROMETHOLONE 0.1% (FML) 0.1 % DRPS    Place 1 drop into both eyes 4 (four) times daily. for 10 days    FLUTICASONE PROPIONATE (FLONASE) 50 MCG/ACTUATION NASAL SPRAY    Use 1 spray (50 mcg total) in each nostril once daily.    HYDROMORPHONE (DILAUDID) 4 MG TABLET    Take 0.5 tablets (2 mg total) by mouth every 12 (twelve) hours as needed for Pain.    IBUPROFEN (ADVIL,MOTRIN) 600 MG TABLET    Take one tablet by mouth every 6 " hours as needed for pain    IBUPROFEN (ADVIL,MOTRIN) 800 MG TABLET    Take 1 tablet by mouth every 6 to 8 hours as needed for pain    INV PROPULSID 10 MG    Take 2 tablets (20 mg) by mouth 4 (four) times daily. FOR INVESTIGATIONAL USE ONLY. Protocol [id number] Subject ID: [id number].    LEFLUNOMIDE (ARAVA) 20 MG TAB    Take 1 tablet (20 mg total) by mouth once daily.    LIDOCAINE (LIDODERM) 5 %    Place 1 patch onto the skin once daily. Remove & Discard patch within 12 hours or as directed by MD    LIFITEGRAST (XIIDRA) 5 % DPET    INSTILL ONE DROP INTO BOTH EYES TWICE A DAY    MAGIC MOUTHWASH DIPHEN/ANTAC/LIDOC    rinse mouth with 5 ml for 30 seconds and spit out, twice daily    METHENAMINE (HIPREX) 1 GRAM TAB    Take 1 tablet (1 g total) by mouth 2 (two) times daily.    MIRABEGRON (MYRBETRIQ) 25 MG TB24 ER TABLET    Take 1 tablet (25 mg total) by mouth once daily.    MONTELUKAST (SINGULAIR) 10 MG TABLET    Take 1 tablet (10 mg total) by mouth every evening.    MORPHINE (MS CONTIN) 15 MG 12 HR TABLET    Take 1 tablet (15 mg total) by mouth 2 (two) times daily.    NALOXONE (NARCAN) 4 MG/ACTUATION SPRY    4mg by nasal route as needed for opioid overdose; may repeat every 2-3 minutes in alternating nostrils until medical help arrives. Call 911    NAPROXEN (NAPROSYN) 500 MG TABLET    TAKE 1 TABLET BY MOUTH TWICE A DAY AS NEEDED    OMEPRAZOLE (PRILOSEC) 40 MG CAPSULE    Take 1 capsule (40 mg total) by mouth every morning.    OMEPRAZOLE-SODIUM BICARBONATE (ZEGERID) 40-1.1 MG-GRAM PER CAPSULE    TAKE 1 CAPSULE BY MOUTH EVERY DAY IN THE MORNING    OXYCODONE (ROXICODONE) 10 MG TAB IMMEDIATE RELEASE TABLET    Take 1 tablet by mouth every 4 (four) hours as needed (acute post op pain) for up to 7 days Max Daily Amount: 60 mg    PANTOPRAZOLE (PROTONIX) 40 MG TABLET    Take 1 tablet (40 mg total) by mouth 2 (two) times daily.    POTASSIUM CHLORIDE SA (K-DUR,KLOR-CON) 20 MEQ TABLET    Take 1 tablet (20 mEq total) by mouth once daily.     PREDNISONE (DELTASONE) 1 MG TABLET    Take 2 tablets (2 mg total) by mouth once daily.    PREDNISONE (DELTASONE) 5 MG TABLET    Take 1 tablet (5 mg total) by mouth once daily.    PREGABALIN (LYRICA) 150 MG CAPSULE    Take 1 capsule (150 mg total) by mouth 3 (three) times daily.    PROMETHAZINE (PHENERGAN) 25 MG TABLET    Take 1 tablet (25 mg total) by mouth every 6 (six) hours as needed for Nausea.    PROMETHAZINE-DEXTROMETHORPHAN (PROMETHAZINE-DM) 6.25-15 MG/5 ML SYRP    Take 10 mLs by mouth nightly as needed for cough    ROSUVASTATIN (CRESTOR) 20 MG TABLET    TAKE 1 TABLET BY MOUTH EVERY DAY IN THE EVENING    SARILUMAB (KEVZARA) 200 MG/1.14 ML SYRG    Inject 1.14 mL (200 mg total) into the skin every 14 (fourteen) days.    SENNA-DOCUSATE 8.6-50 MG (PERICOLACE) 8.6-50 MG PER TABLET    Take 1 tablet by mouth daily    STIMULANT LAXATIVE PLUS 8.6-50 MG PER TABLET    Take 1 tablet by mouth.    SUCRALFATE (CARAFATE) 1 GRAM TABLET    TAKE 1 TABLET BY MOUTH FOUR TIMES A DAY    TERIPARATIDE (FORTEO) 20 MCG/DOSE (600MCG/2.4ML) PNIJ    Inject 0.08 mLs (20 mcg total) into the skin once daily.    TIZANIDINE (ZANAFLEX) 4 MG TABLET    Take 1 tablet (4 mg total) by mouth every 6 (six) hours as needed (for muscle spasms).    TRIAMCINOLONE (NASACORT) 55 MCG NASAL INHALER    2 sprays by Nasal route once daily.        Objective:   There were no vitals filed for this visit.    Physical Exam    Assessment:     Problem List Items Addressed This Visit          Oncology    Normocytic anemia - Primary    Relevant Orders    RETICULOCYTES    LDH    CMP    VITAMIN B12    FOLATE    PROTEIN ELECTROPHORESIS, SERUM    IMMUNOFIXATION ELECTROPHORESIS, SERUM    Immunoglobulin Free LT Chains Blood    IMMUNOGLOBULINS (IGG, IGA, IGM) QUANTITATIVE     Other Visit Diagnoses       Deficiency of other specified B group vitamins        Relevant Orders    VITAMIN B12    FOLATE            Lab Results   Component Value Date    WBC 4.24 10/14/2024    RBC  4.11 10/14/2024    HGB 10.8 (L) 10/14/2024    HCT 34.4 (L) 10/14/2024    MCV 84 10/14/2024    MCH 26.3 (L) 10/14/2024    MCHC 31.4 (L) 10/14/2024    RDW 16.1 (H) 10/14/2024     10/14/2024    MPV 10.9 10/14/2024    GRAN 2.7 10/14/2024    GRAN 63.5 10/14/2024    LYMPH 0.9 (L) 10/14/2024    LYMPH 21.7 10/14/2024    MONO 0.6 10/14/2024    MONO 13.7 10/14/2024    EOS 0.0 10/14/2024    BASO 0.04 10/14/2024    EOSINOPHIL 0.2 10/14/2024    BASOPHIL 0.9 10/14/2024      Lab Results   Component Value Date     12/05/2024    K 4.7 12/05/2024     10/14/2024    CO2 30 12/05/2024    BUN 12 12/05/2024    CREATININE 0.64 12/05/2024    CALCIUM 10.7 (H) 12/05/2024    ANIONGAP 10 10/14/2024    ESTGFRAFRICA >60.0 06/20/2022    EGFRNONAA >60.0 06/20/2022     Lab Results   Component Value Date    ALT 24 10/14/2024    AST 26 10/14/2024     (H) 09/07/2023    ALKPHOS 122 10/14/2024    BILITOT 0.4 10/14/2024     Lab Results   Component Value Date    IRON 109 03/19/2024    TRANSFERRIN 198 (L) 03/19/2024    TIBC 293 03/19/2024    FESATURATED 37 03/19/2024    FERRITIN 64 07/17/2024        Plan:   Normocytic anemia  -     RETICULOCYTES; Future; Expected date: 12/30/2024  -     LDH; Future; Expected date: 12/30/2024  -     CMP; Future; Expected date: 12/30/2024  -     VITAMIN B12; Future; Expected date: 12/30/2024  -     FOLATE; Future; Expected date: 12/30/2024  -     PROTEIN ELECTROPHORESIS, SERUM; Future; Expected date: 12/30/2024  -     IMMUNOFIXATION ELECTROPHORESIS, SERUM; Future; Expected date: 12/30/2024  -     Immunoglobulin Free LT Chains Blood; Future; Expected date: 12/30/2024  -     IMMUNOGLOBULINS (IGG, IGA, IGM) QUANTITATIVE; Future; Expected date: 12/30/2024    Deficiency of other specified B group vitamins  -     VITAMIN B12; Future; Expected date: 12/30/2024  -     FOLATE; Future; Expected date: 12/30/2024        Collaborating Provider:  Dr. Judd Jordan    Thank You,  Vamshi Cabral, JAYME-C  Benign  Hematology

## 2025-01-06 ENCOUNTER — LAB VISIT (OUTPATIENT)
Dept: LAB | Facility: OTHER | Age: 65
End: 2025-01-06
Payer: MEDICARE

## 2025-01-06 ENCOUNTER — OFFICE VISIT (OUTPATIENT)
Dept: PAIN MEDICINE | Facility: CLINIC | Age: 65
End: 2025-01-06
Payer: MEDICARE

## 2025-01-06 VITALS
HEART RATE: 78 BPM | DIASTOLIC BLOOD PRESSURE: 74 MMHG | BODY MASS INDEX: 26.66 KG/M2 | TEMPERATURE: 98 F | SYSTOLIC BLOOD PRESSURE: 126 MMHG | WEIGHT: 141.13 LBS

## 2025-01-06 DIAGNOSIS — G57.03 PIRIFORMIS SYNDROME OF BOTH SIDES: ICD-10-CM

## 2025-01-06 DIAGNOSIS — M96.1 POSTLAMINECTOMY SYNDROME OF LUMBAR REGION: ICD-10-CM

## 2025-01-06 DIAGNOSIS — G89.4 CHRONIC PAIN DISORDER: Primary | ICD-10-CM

## 2025-01-06 DIAGNOSIS — Z79.891 ENCOUNTER FOR LONG-TERM OPIATE ANALGESIC USE: ICD-10-CM

## 2025-01-06 DIAGNOSIS — Z98.1 S/P LUMBAR SPINAL FUSION: ICD-10-CM

## 2025-01-06 DIAGNOSIS — D50.0 IRON DEFICIENCY ANEMIA DUE TO CHRONIC BLOOD LOSS: ICD-10-CM

## 2025-01-06 DIAGNOSIS — D64.9 NORMOCYTIC ANEMIA: ICD-10-CM

## 2025-01-06 DIAGNOSIS — E53.8 DEFICIENCY OF OTHER SPECIFIED B GROUP VITAMINS: ICD-10-CM

## 2025-01-06 LAB
ALBUMIN SERPL BCP-MCNC: 3.8 G/DL (ref 3.5–5.2)
ALP SERPL-CCNC: 112 U/L (ref 40–150)
ALT SERPL W/O P-5'-P-CCNC: 21 U/L (ref 10–44)
ANION GAP SERPL CALC-SCNC: 7 MMOL/L (ref 8–16)
AST SERPL-CCNC: 24 U/L (ref 10–40)
BASOPHILS # BLD AUTO: 0.04 K/UL (ref 0–0.2)
BASOPHILS NFR BLD: 1 % (ref 0–1.9)
BILIRUB SERPL-MCNC: 0.2 MG/DL (ref 0.1–1)
BUN SERPL-MCNC: 12 MG/DL (ref 8–23)
CALCIUM SERPL-MCNC: 9.4 MG/DL (ref 8.7–10.5)
CHLORIDE SERPL-SCNC: 107 MMOL/L (ref 95–110)
CO2 SERPL-SCNC: 28 MMOL/L (ref 23–29)
CREAT SERPL-MCNC: 0.7 MG/DL (ref 0.5–1.4)
DIFFERENTIAL METHOD BLD: ABNORMAL
EOSINOPHIL # BLD AUTO: 0 K/UL (ref 0–0.5)
EOSINOPHIL NFR BLD: 0.5 % (ref 0–8)
ERYTHROCYTE [DISTWIDTH] IN BLOOD BY AUTOMATED COUNT: 15.5 % (ref 11.5–14.5)
EST. GFR  (NO RACE VARIABLE): >60 ML/MIN/1.73 M^2
FERRITIN SERPL-MCNC: 10 NG/ML (ref 20–300)
FOLATE SERPL-MCNC: 9.5 NG/ML (ref 4–24)
GLUCOSE SERPL-MCNC: 94 MG/DL (ref 70–110)
HCT VFR BLD AUTO: 33.5 % (ref 37–48.5)
HGB BLD-MCNC: 10.7 G/DL (ref 12–16)
IGA SERPL-MCNC: 101 MG/DL (ref 40–350)
IGG SERPL-MCNC: 466 MG/DL (ref 650–1600)
IGM SERPL-MCNC: 44 MG/DL (ref 50–300)
IMM GRANULOCYTES # BLD AUTO: 0.01 K/UL (ref 0–0.04)
IMM GRANULOCYTES NFR BLD AUTO: 0.2 % (ref 0–0.5)
IRON SERPL-MCNC: 33 UG/DL (ref 30–160)
LDH SERPL L TO P-CCNC: 236 U/L (ref 110–260)
LYMPHOCYTES # BLD AUTO: 1.1 K/UL (ref 1–4.8)
LYMPHOCYTES NFR BLD: 26.3 % (ref 18–48)
MCH RBC QN AUTO: 25.9 PG (ref 27–31)
MCHC RBC AUTO-ENTMCNC: 31.9 G/DL (ref 32–36)
MCV RBC AUTO: 81 FL (ref 82–98)
MONOCYTES # BLD AUTO: 0.8 K/UL (ref 0.3–1)
MONOCYTES NFR BLD: 19.5 % (ref 4–15)
NEUTROPHILS # BLD AUTO: 2.2 K/UL (ref 1.8–7.7)
NEUTROPHILS NFR BLD: 52.5 % (ref 38–73)
NRBC BLD-RTO: 0 /100 WBC
PLATELET # BLD AUTO: 283 K/UL (ref 150–450)
PMV BLD AUTO: 10.5 FL (ref 9.2–12.9)
POTASSIUM SERPL-SCNC: 4.5 MMOL/L (ref 3.5–5.1)
PROT SERPL-MCNC: 6.1 G/DL (ref 6–8.4)
RBC # BLD AUTO: 4.13 M/UL (ref 4–5.4)
RETICS/RBC NFR AUTO: 1.4 % (ref 0.5–2.5)
SATURATED IRON: 7 % (ref 20–50)
SODIUM SERPL-SCNC: 142 MMOL/L (ref 136–145)
TOTAL IRON BINDING CAPACITY: 462 UG/DL (ref 250–450)
TRANSFERRIN SERPL-MCNC: 312 MG/DL (ref 200–375)
VIT B12 SERPL-MCNC: 265 PG/ML (ref 210–950)
WBC # BLD AUTO: 4.11 K/UL (ref 3.9–12.7)

## 2025-01-06 PROCEDURE — 82607 VITAMIN B-12: CPT

## 2025-01-06 PROCEDURE — 84165 PROTEIN E-PHORESIS SERUM: CPT

## 2025-01-06 PROCEDURE — 83615 LACTATE (LD) (LDH) ENZYME: CPT

## 2025-01-06 PROCEDURE — 83521 IG LIGHT CHAINS FREE EACH: CPT | Mod: 59

## 2025-01-06 PROCEDURE — 3074F SYST BP LT 130 MM HG: CPT | Mod: CPTII,S$GLB,, | Performed by: NURSE PRACTITIONER

## 2025-01-06 PROCEDURE — 3008F BODY MASS INDEX DOCD: CPT | Mod: CPTII,S$GLB,, | Performed by: NURSE PRACTITIONER

## 2025-01-06 PROCEDURE — 1160F RVW MEDS BY RX/DR IN RCRD: CPT | Mod: CPTII,S$GLB,, | Performed by: NURSE PRACTITIONER

## 2025-01-06 PROCEDURE — 84165 PROTEIN E-PHORESIS SERUM: CPT | Mod: 26,,, | Performed by: PATHOLOGY

## 2025-01-06 PROCEDURE — 1159F MED LIST DOCD IN RCRD: CPT | Mod: CPTII,S$GLB,, | Performed by: NURSE PRACTITIONER

## 2025-01-06 PROCEDURE — 82728 ASSAY OF FERRITIN: CPT

## 2025-01-06 PROCEDURE — 86334 IMMUNOFIX E-PHORESIS SERUM: CPT

## 2025-01-06 PROCEDURE — 36415 COLL VENOUS BLD VENIPUNCTURE: CPT

## 2025-01-06 PROCEDURE — 3078F DIAST BP <80 MM HG: CPT | Mod: CPTII,S$GLB,, | Performed by: NURSE PRACTITIONER

## 2025-01-06 PROCEDURE — 99999 PR PBB SHADOW E&M-EST. PATIENT-LVL IV: CPT | Mod: PBBFAC,,, | Performed by: NURSE PRACTITIONER

## 2025-01-06 PROCEDURE — 80053 COMPREHEN METABOLIC PANEL: CPT

## 2025-01-06 PROCEDURE — 85025 COMPLETE CBC W/AUTO DIFF WBC: CPT

## 2025-01-06 PROCEDURE — 99214 OFFICE O/P EST MOD 30 MIN: CPT | Mod: S$GLB,,, | Performed by: NURSE PRACTITIONER

## 2025-01-06 PROCEDURE — 82746 ASSAY OF FOLIC ACID SERUM: CPT

## 2025-01-06 PROCEDURE — 85045 AUTOMATED RETICULOCYTE COUNT: CPT

## 2025-01-06 PROCEDURE — 82784 ASSAY IGA/IGD/IGG/IGM EACH: CPT

## 2025-01-06 PROCEDURE — 84466 ASSAY OF TRANSFERRIN: CPT

## 2025-01-06 PROCEDURE — 86334 IMMUNOFIX E-PHORESIS SERUM: CPT | Mod: 26,,, | Performed by: PATHOLOGY

## 2025-01-06 NOTE — PROGRESS NOTES
Pain Follow up Note- Established Patient         Subjective:      Patient ID: Joyce Peterson is a 64 y.o. female.    Chief Complaint: Hip Pain    Referred by: No ref. provider found     Interval History 1/6/2025:  The patient returns today for follow up of chronic pain back pain. Since previous visit, she has been weaning down MS Contin. She is now on 15 mg q12h. She tried skipping a dose yesterday but says that she felt jittery. Otherwise, she has done well with regards to decreasing the medication. She has been more active at home which she is happy about. She still has difficulty with walking and feels unsteady. She did complete PT and plans to join a gym. She is also having more right hip pain recently, mainly at night when she lays on the right side. She says that hip injections in the past were not very helpful. Her pain today is 0/10.    Interval History 10/29/2024:  Mrs. Peterson returns today for follow up of chronic back, hip and knee pain. She was previously changed from oxycodone to long acting morphine by Dr. Mayfield was chronic pain. She is taking MS Contin 30mg Q12. She reports significant benefit of pain with this regimen. With the medication, she has no pain. She would like to start weaning down the medication. It was already sent to the pharmacy for today. She has not been able to be as active as she would like to be since her surgeries. She is able to walk short distances. Her pain today is 0/10.    Interval history: 7/25/24:   Joyce Peterson returns to clinic for opioid management for her chronic low back pain, BL hip and knee pain. At her last visit, oxycodone 10 mg was discontinued and patient was started on 30 mg ER morphine. Patient was instructed to use 10 mg oxycodone for breakthrough pain no more than 3 times daily. Today, she reports that her pain is much improved since the switch in pain medication to morphine. She states that her pain is currently 3/10 in severity with the  majority of her pain being in the right knee, localized around the lateral joint line. She is s/p L2-pelvic fusion and states that she was doing home health therapy but is about to start outpatient therapy. She reports that her pain is typically worse at the end of the day with throbbing in the right knee. Of note, she does have DVT present in the RLE. She also uses Tylenol and Lyrica for pain which helps. She denies any new weakness, paresthesias, changes in bowel or bladder function, or saddle anesthesia.      Interval History 7/17/2024:   Joyce Peterson with a history of RA and lumbar radiculopathy s/p L2-pelvis fusion, L5-S1 TLIF comes to the clinic for opioid management for her for chronic lower back, B/L hips and B/L knee pain. Patient's current opioid management is done by [facility] but they will not prescirbe Oxycodone 10 Q4 hours beyond 6 weeks and recommended follow up with pain medicine. Patient said she gets good pain relief with the Oxycodone 10 Q4 hours. Pain is worse at nights, prolonged walking and sitting. She is also using Voltaren gel, Lyrica and Zanaflex. Will use tylenol PRN for break through pain. The pain is mostly in her hips and knees bilaterally.  Her lower back pain with radiation down her legs have improved after the surgery.  Patient said she got good pain relief and restoration of function with PT but her days were reduced from 3 days to 2 days. She has follow up with [facility] and Rheumatology.     Interval History 6/27/2024:  Joyce Peterson returns to clinic for follow-up for chronic back pain. She is s/p L2-pelvis fusion on 6/12/2024. She has noticed increased pain over the last 3 nights. She had a visit with [facility] on 6/25/2024 and was told this was normal healing and nerve regeneration from the surgery. The patient said [facility] will not prescribe her pain longer than 6 weeks s/p surgery.  Her current regimen from [facility] is oxycodone 10 mg q4 hours, hydromorphone 1 mg for breakthrough pain.   She also takes Lyrica 150 mg TID and Robaxin 750 mg QID.  She is not supposed to take Naproxen s/p surgery, but has taken it a couple times for severe pain.  The patient denies fever/night sweats, urinary incontinence, bowel incontinence, significant weight changes, significant motor weakness or changes, or loss of sensations. Her pain today is 6/10.    Interval History 4/17/2024:   Patient returns to clinic for follow up of low back and hip pain. She was scheduled for SCS trial however trial was not completed due to patient preference. She states she has been seen by neurosugery and orthopedic surgery, for which she plans to undergo back surgery. 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  She reports pain has been worsening  since last visit. She states she continues to take lyrica TID, robaxin 750mg QID, oxycodone 5 q.i.d. p.r.n.  for pain control, she states this has not provided her with relief x the last 3 weeks. She reports she has not been able to participate in HEP due to pain. Pain level at time of exam is reported to be 8.5/10. Patient requests she wants to return to a previous regimen of percocet and dilaudid until she is able to have back procedure. She expresses excruciating pain that runs down bilateral pain with any movement and with coughing.     Interval history  She returns for virtual follow up of back and hip pain. At her last OV, Dr. Mayfield recommended [place] SCS trial. She had her first part of the psych consult and has the second part this afternoon. She has had limited benefit with multiple interventions in the past including back surgery, procedures, PT and medications. She would like to move forward with SCS trial. Her pain today is 10/10.    Interval History 1/16/24:  Joyce Peterson returns for virtual follow up of low back and left hip pain. She is s/p L4/5 TLIF 6/22/23. She is  planning for a left hip RFA and SCS trial with us. 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 She has since had an MRI of her L Spine in December that demonstrated a large amount of extruded disc material extending superiorly from L4-5 into the spinal canal with additional grade 2 anterolisthesis at this level contributing to severe spinal canal stenosis. She reports intermittent weakness in her left > right leg. Patient denies saddle anesthesia, bladder/bowel incontinence, ataxia, or increased falls. Shopping cart sign +. She is able to walk 1 block before needing to stop. Pain improves upon sitting. Pain currently ranges from 5-9/10, currently a 7/10. Pain radiates down her both her legs anterolaterally down to the ankle with additional shin pain. She has been on percocet 5-325 QID PRN and lyrica 150mg TID. She received opioids from Ortho 5/325 in October addition to our Rx. Ortho saw patient today and is recommending an L2-Pelvis    Pertinent Surgeries:  6/12/2024 - L2-Pelvis Fusion ([place])    Pain Medication   30 mg Morphine extended release (started on 7/17/2024)  Pregabalin 150 mg   Oxycodone 10 mg Q 4 hours   Percocet 5 mg (discontinued)  Voltaren Gel   Tylenol     Past Medical History:   Diagnosis Date    Acid reflux     Allergy     Anemia     Asthma     Coronary artery disease     CS (cervical spondylosis) 03/08/2013    Degenerative disc disease     Degenerative joint disease of hindfoot, left 6/28/2022    Dry eyes     Dry mouth     Gastroparesis     History of methotrexate therapy 01/19/2022    Hyperlipidemia     Lateral meniscus derangement 04/06/2016    Lobular carcinoma in situ     Lumbar spondylosis 03/08/2013    Osteoarthritis     Osteoporosis     Pes planovalgus, acquired, left 6/28/2022    Rheumatoid arthritis(714.0)     Rupture of left triceps tendon 10/17/2018    Umbilical hernia 08/13/2015     Past Surgical History:   Procedure Laterality Date    BACK SURGERY   06/12/2024    revision of prior back surgery on 07/14/2023    BREAST BIOPSY Left 01/29/2002    core bx    CARPAL TUNNEL RELEASE Right 05/2017    CHOLECYSTECTOMY  2004    COLONOSCOPY      10/11    COLONOSCOPY N/A 06/29/2017    Procedure: COLONOSCOPY;  Surgeon: López Moore MD;  Location: [facility] ENDO (4TH FLR);  Service: Endoscopy;  Laterality: N/A;    EPIDURAL STEROID INJECTION N/A 11/18/2021    Procedure: INJECTION, STEROID, EPIDURAL, L5-S1 IL NEED CONSENT;  Surgeon: Tj Mayfield MD;  Location: [facility] PAIN MGT;  Service: Pain Management;  Laterality: N/A;    EPIDURAL STEROID INJECTION N/A 09/29/2022    Procedure: LUMBAR L3/L4 IL MADELINE CONTRAST;  Surgeon: Tj Mayfield MD;  Location: [facility] PAIN MGT;  Service: Pain Management;  Laterality: N/A;    EPIDURAL STEROID INJECTION N/A 12/12/2022    Procedure: INJECTION, STEROID, EPIDURAL L5/S1 IL CONTRAST;  Surgeon: Tj Mayfield MD;  Location: [facility] PAIN MGT;  Service: Pain Management;  Laterality: N/A;    EPIDURAL STEROID INJECTION N/A 04/06/2023    Procedure: INJECTION, STEROID, EPIDURAL L5/S1;  Surgeon: Tj Mayfiled MD;  Location: [facility] PAIN MGT;  Service: Pain Management;  Laterality: N/A;    FOOT ARTHRODESIS Left 01/11/2023    Procedure: FUSION, FOOT;  Surgeon: Ariella Finnegan MD;  Location: [facility] OR;  Service: Orthopedics;  [facility]: [facility]    FUSION, SPINE, LUMBAR, TLIF, POSTERIOR APPROACH, USING PEDICLE SCREW N/A 06/22/2023    Procedure: FUSION, SPINE, LUMBAR, TLIF, POSTERIOR APPROACH, USING PEDICLE SCREW L4/5 spinewave SNS:SSEP/EMG;  Surgeon: Jh Mosqueda MD;  Location: [facility] OR 2ND FLR;  Service: Neurosurgery;  Laterality: N/A;    HARVESTING OF BONE GRAFT Left 01/11/2023    Procedure: SURGICAL PROCUREMENT, BONE GRAFT;  Surgeon: Ariella Finnegan MD;  Location: [facility] OR;  Service: Orthopedics;  Laterality: Left;    INJECTION OF ANESTHETIC AGENT AROUND NERVE Bilateral 08/23/2021    Procedure: BLOCK, NERVE, MEDIAL BRANCH L3,L4,L5;  Surgeon: Tj Mayfield MD;  Location:  [facility] PAIN MGT;  Service: Pain Management;  Laterality: Bilateral;  1 of 2    INJECTION OF ANESTHETIC AGENT AROUND NERVE Bilateral 08/26/2021    Procedure: BLOCK, NERVE, MEDIAL BRANCH L3,L4,L5;  Surgeon: Tj Mayfield MD;  Location: [facility] PAIN MGT;  Service: Pain Management;  Laterality: Bilateral;  2 of 2    INJECTION OF ANESTHETIC AGENT AROUND NERVE Left 07/07/2022    Procedure: BLOCK, NERVE, LEFT OBTURATOR AND FEMORAL;  Surgeon: Tj Mayfield MD;  Location: [facility] PAIN MGT;  Service: Pain Management;  Laterality: Left;    INJECTION OF FACET JOINT Bilateral 03/12/2020    Procedure: FACET JOINT INJECTION (LUMBAR BLOCK) BILATERAL L4-5 AND L5-S1 DIRECT REFERRAL;  Surgeon: Tj Mayfield MD;  Location: [facility] PAIN MGT;  Service: Pain Management;  Laterality: Bilateral;  NEEDS CONSENT    INJECTION OF FACET JOINT Bilateral 03/29/2021    Procedure: INJECTION, FACET JOINT L3/4, L4/5, L5/S1;  Surgeon: Tj Mayfield MD;  Location: [facility] PAIN MGT;  Service: Pain Management;  Laterality: Bilateral;    INJECTION OF JOINT Right 07/30/2020    Procedure: INJECTION, JOINT, RIGHT HIP Interarticular under flouro;  Surgeon: Tj Mayfield MD;  Location: [facility] PAIN MGT;  Service: Pain Management;  Laterality: Right;  INJECTION, JOINT, RIGHT HIP Interarticular under flouro    INJECTION OF JOINT Right 02/21/2022    Procedure: Injection, Joint RIGHT ISCHIAL BURSA;  Surgeon: Tj Mayfield MD;  Location: [facility] PAIN MGT;  Service: Pain Management;  Laterality: Right;    INTRAMEDULLARY RODDING OF FEMUR Left 05/21/2019    Procedure: INSERTION, INTRAMEDULLARY ARIEL, FEMUR;  Surgeon: López Duff MD;  [facility];  Service: Orthopedics;  Laterality: Left;    LENGTHENING OF TENDON OF LOWER EXTREMITY Left 01/11/2023    Procedure: LENGTHENING, TENDON, LOWER EXTREMITY;  Surgeon: Ariella Finnegan MD;  Location: [facility] OR;  Service: Orthopedics;  Laterality: Left;    MAGNETIC RESONANCE IMAGING N/A 05/29/2023    Procedure: MRI (Magnetic Resonance  Imagine);  Surgeon: Sujey Surgeon;  Location: [facility] SUJEY;  Service: Anesthesiology;  Laterality: N/A;    MAGNETIC RESONANCE IMAGING N/A 01/10/2024    Procedure: MRI (Magnetic Resonance Imagine);  Surgeon: Sujey Robin;  Location: [facility];  Service: Anesthesiology;  Laterality: N/A;    RADIOFREQUENCY ABLATION Left 09/20/2021    Procedure: RADIOFREQUENCY ABLATION left L3,4,5 RFA  1 of 2  CONSENT NEEDED;  Surgeon: Tj Mayfield MD;  Location: [facility] PAIN MGT;  Service: Pain Management;  Laterality: Left;    RADIOFREQUENCY ABLATION Right 10/04/2021    Procedure: RADIOFREQUENCY ABLATION  right L3,4,5 RFA   2 of 2  CONSENT NEEDED;  Surgeon: Tj Mayfield MD;  Location: [facility] PAIN MGT;  Service: Pain Management;  Laterality: Right;    RADIOFREQUENCY ABLATION Left 04/21/2022    Procedure: Radiofrequency Ablation LEFT L3,L4,L5;  Surgeon: Tj Mayfield MD;  Location: [facility] PAIN MGT;  Service: Pain Management;  Laterality: Left;    RADIOFREQUENCY ABLATION Right 05/12/2022    Procedure: Radiofrequency Ablation RIGHT L3,L4,L5;  Surgeon: Tj Mayfield MD;  Location: [facility] PAIN MGT;  Service: Pain Management;  Laterality: Right;    RADIOFREQUENCY ABLATION Left 07/25/2022    Procedure: Radiofrequency Ablation Left Femoral & Obturator;  Surgeon: Tj Mayfield MD;  Location: [facility] PAIN MGT;  Service: Pain Management;  Laterality: Left;    REPAIR OF TRICEPS TENDON Left 10/17/2018    Procedure: REPAIR, TENDON, TRICEPS left elbow;  Surgeon: Staci Yarbrough MD;  Location: [facility] OR [address] [place] [address];  Service: Orthopedics;  Laterality: Left;  Anesthesia: General and regional. PRONE, k-wire , hand pan 1 and pan 2, CALL ARTHREX/Tamera notified 10-12 LO    TONSILLECTOMY      TRANSFORAMINAL EPIDURAL INJECTION OF STEROID Right 08/31/2020    Procedure: INJECTION, STEROID, EPIDURAL, TRANSFORAMINAL,  APPROACH, L3-L4 and L4-L5 need consent;  Surgeon: Tj Mayfield MD;  Location: [facility] PAIN MGT;  Service: Pain Management;  Laterality: Right;     "TRANSFORAMINAL EPIDURAL INJECTION OF STEROID Bilateral 07/23/2021    Procedure: INJECTION, STEROID, EPIDURAL, TRANSFORAMINAL APPROACH L4/5;  Surgeon: Tj Mayfield MD;  Location: [facility] PAIN MGT;  Service: Pain Management;  Laterality: Bilateral;    TRANSFORAMINAL EPIDURAL INJECTION OF STEROID Bilateral 09/25/2023    Procedure: LUMBAR TRANSFORAMINAL BILATERAL  L2/3 DIRECT REFERRAL;  Surgeon: Tj Mayfield MD;  Location: [facility] PAIN MGT;  Service: Pain Management;  Laterality: Bilateral;    TRANSFORAMINAL EPIDURAL INJECTION OF STEROID Left 10/18/2023    Procedure: LUMBAR TRANSFORAMINAL LEFT L3/4 AND L4/5 * CLEARANCE IN CHART*;  Surgeon: Tj Mayfield MD;  Location: [facility] PAIN MGT;  Service: Pain Management;  Laterality: Left;    TRIGGER POINT INJECTION N/A 07/23/2021    Procedure: INJECTION, TRIGGER POINT SCAPULAR;  Surgeon: Tj Mayfield MD;  Location: [facility] PAIN MGT;  Service: Pain Management;  Laterality: N/A;    TUBAL LIGATION  2003    UPPER GASTROINTESTINAL ENDOSCOPY      10/11    uterine ablation  2003     Review of patient's allergies indicates:   Allergen Reactions    Actemra [tocilizumab] Other (See Comments)     Severe dizziness    Codeine Nausea And Vomiting and Hives    Gold au 198 Hives and Rash    Hydroxychloroquine Other (See Comments)     Can't remember the reaction      Iodinated contrast media Other (See Comments)     Other reaction(s): BURNING ALL OVER    Iodine Other (See Comments) and Hives     Other reaction(s): BURNING ALL OVER - Iodine dye - Not topical    Ondansetron Nausea And Vomiting    Sulfa (sulfonamide antibiotics) Other (See Comments)     Can't remember the reaction    Zofran [ondansetron hcl (pf)] Nausea And Vomiting     Pt reports that last time she received zofran she started vomiting again    Methotrexate analogues Nausea Only    Pneumococcal vaccine Other (See Comments)    Pneumovax 23 [pneumococcal 23-argenis ps vaccine] Other (See Comments)     "sick"    Methotrexate Nausea Only " "    Current Outpatient Medications   Medication Sig Dispense Refill    albuterol (PROVENTIL/VENTOLIN HFA) 90 mcg/actuation inhaler Inhale 2 puffs into the lungs every 6 (six) hours as needed for Wheezing. Rescue 20.1 g 11    apixaban (ELIQUIS) 5 mg Tab Take 1 tablet (5 mg total) by mouth 2 (two) times daily. 60 tablet 0    azithromycin (ZITHROMAX Z-DEVON) 250 MG tablet Take 2 tablets (500 mg) on  Day 1,  followed by 1 tablet (250 mg) once daily on Days 2 through 5. 6 tablet 0    BD ULTRA-FINE JAIME PEN NEEDLE 32 gauge x 5/32" Ndle For Forteo - inject daily 100 each 3    benzonatate (TESSALON PERLES) 100 MG capsule Take 2 capsules (200 mg total) by mouth 3 (three) times daily as needed for Cough. 30 capsule 1    budesonide-formoterol 160-4.5 mcg (SYMBICORT) 160-4.5 mcg/actuation HFAA Inhale 2 puffs into the lungs every 12 (twelve) hours. 30.6 g 3    calcium citrate-vitamin D3 315-200 mg (CITRACAL+D) 315 mg-5 mcg (200 unit) per tablet Take 1 tablet by mouth 2 (two) times daily.       diclofenac sodium (VOLTAREN) 1 % Gel APPLY 2 GRAMS TOPICALLY 4 (FOUR) TIMES DAILY AS NEEDED. 300 g 2    fexofenadine (ALLEGRA) 180 MG tablet Take 1 tablet (180 mg total) by mouth once daily. 90 tablet 3    fluconazole (DIFLUCAN) 150 MG Tab Take 150 mg by mouth as needed.      fluticasone propionate (FLONASE) 50 mcg/actuation nasal spray Use 1 spray (50 mcg total) in each nostril once daily. 16 g 1    HYDROmorphone (DILAUDID) 4 MG tablet Take 0.5 tablets (2 mg total) by mouth every 12 (twelve) hours as needed for Pain. 60 tablet 0    ibuprofen (ADVIL,MOTRIN) 600 MG tablet Take one tablet by mouth every 6 hours as needed for pain 20 tablet 0    ibuprofen (ADVIL,MOTRIN) 800 MG tablet Take 1 tablet by mouth every 6 to 8 hours as needed for pain 30 tablet 0    INV PROPULSID 10 MG Take 2 tablets (20 mg) by mouth 4 (four) times daily. FOR INVESTIGATIONAL USE ONLY. Protocol [id number] Subject ID: [id number]. 1440 each 0    leflunomide (ARAVA) 20 MG Tab " Take 1 tablet (20 mg total) by mouth once daily. 90 tablet 0    LIDOcaine (LIDODERM) 5 % Place 1 patch onto the skin once daily. Remove & Discard patch within 12 hours or as directed by MD 30 patch 1    lifitegrast (XIIDRA) 5 % Dpet INSTILL ONE DROP INTO BOTH EYES TWICE A DAY 60 mL 10    magic mouthwash diphen/antac/lidoc rinse mouth with 5 ml for 30 seconds and spit out, twice daily 150 mL 2    methenamine (HIPREX) 1 gram Tab Take 1 tablet (1 g total) by mouth 2 (two) times daily. 60 tablet 11    mirabegron (MYRBETRIQ) 25 mg Tb24 ER tablet Take 1 tablet (25 mg total) by mouth once daily. 30 tablet 11    montelukast (SINGULAIR) 10 mg tablet Take 1 tablet (10 mg total) by mouth every evening. 90 tablet 3    morphine (MS CONTIN) 15 MG 12 hr tablet Take 1 tablet (15 mg total) by mouth 2 (two) times daily. 60 tablet 0    naloxone (NARCAN) 4 mg/actuation Spry 4mg by nasal route as needed for opioid overdose; may repeat every 2-3 minutes in alternating nostrils until medical help arrives. Call 911 2 each 11    naproxen (NAPROSYN) 500 MG tablet TAKE 1 TABLET BY MOUTH TWICE A DAY AS NEEDED 180 tablet 0    omeprazole (PRILOSEC) 40 MG capsule Take 1 capsule (40 mg total) by mouth every morning. 30 capsule 6    omeprazole-sodium bicarbonate (ZEGERID) 40-1.1 mg-gram per capsule TAKE 1 CAPSULE BY MOUTH EVERY DAY IN THE MORNING 90 capsule 3    oxyCODONE (ROXICODONE) 10 mg Tab immediate release tablet Take 1 tablet by mouth every 4 (four) hours as needed (acute post op pain) for up to 7 days Max Daily Amount: 60 mg 42 tablet 0    pantoprazole (PROTONIX) 40 MG tablet Take 1 tablet (40 mg total) by mouth 2 (two) times daily. 60 tablet 3    potassium chloride SA (K-DUR,KLOR-CON) 20 MEQ tablet Take 1 tablet (20 mEq total) by mouth once daily. 90 tablet 0    predniSONE (DELTASONE) 1 MG tablet Take 2 tablets (2 mg total) by mouth once daily. 180 tablet 1    predniSONE (DELTASONE) 5 MG tablet Take 1 tablet (5 mg total) by mouth once  daily. 90 tablet 1    pregabalin (LYRICA) 150 MG capsule Take 1 capsule (150 mg total) by mouth 3 (three) times daily. 90 capsule 2    promethazine (PHENERGAN) 25 MG tablet Take 1 tablet (25 mg total) by mouth every 6 (six) hours as needed for Nausea. 90 tablet 5    promethazine-dextromethorphan (PROMETHAZINE-DM) 6.25-15 mg/5 mL Syrp Take 10 mLs by mouth nightly as needed for cough 120 mL 0    rosuvastatin (CRESTOR) 20 MG tablet TAKE 1 TABLET BY MOUTH EVERY DAY IN THE EVENING 90 tablet 3    sarilumab (KEVZARA) 200 mg/1.14 mL Syrg Inject 1.14 mL (200 mg total) into the skin every 14 (fourteen) days. 2.28 mL 2    senna-docusate 8.6-50 mg (PERICOLACE) 8.6-50 mg per tablet Take 1 tablet by mouth daily 30 tablet 1    STIMULANT LAXATIVE PLUS 8.6-50 mg per tablet Take 1 tablet by mouth.      sucralfate (CARAFATE) 1 gram tablet TAKE 1 TABLET BY MOUTH FOUR TIMES A  tablet 2    teriparatide (FORTEO) 20 mcg/dose (600mcg/2.4mL) PnIj Inject 0.08 mLs (20 mcg total) into the skin once daily. 2.4 mL 11    tiZANidine (ZANAFLEX) 4 MG tablet Take 1 tablet (4 mg total) by mouth every 6 (six) hours as needed (for muscle spasms). 120 tablet 1    triamcinolone (NASACORT) 55 mcg nasal inhaler 2 sprays by Nasal route once daily. 17 g 5    albuterol-ipratropium (DUO-NEB) 2.5 mg-0.5 mg/3 mL nebulizer solution Take 3 mLs by nebulization every 6 (six) hours as needed for Wheezing. Rescue 90 mL 3    cycloSPORINE (RESTASIS) 0.05 % ophthalmic emulsion Place 1 drop into both eyes 2 (two) times daily. 180 each 3     No current facility-administered medications for this visit.     Family History   Problem Relation Name Age of Onset    Cancer Mother 69         Lung Cancer    Emphysema Mother 69     Heart attack Mother 69     Alcohol abuse Mother 69     Cancer Father          Lung Cancer    Osteoarthritis Father      Lung cancer Father      Skin cancer Father      Alcohol abuse Father      Heart disease Brother 57     Heart attack Brother 57      Alcohol abuse Brother 57     Cirrhosis Brother 57     Osteoarthritis Paternal Aunt      Retinal detachment Maternal Aunt      No Known Problems Sister      No Known Problems Maternal Uncle      No Known Problems Paternal Uncle      No Known Problems Maternal Grandmother      No Known Problems Maternal Grandfather      No Known Problems Paternal Grandmother      No Known Problems Paternal Grandfather      Colon cancer Neg Hx      Esophageal cancer Neg Hx      Stomach cancer Neg Hx      Celiac disease Neg Hx      Diabetes Neg Hx      Thyroid disease Neg Hx      Amblyopia Neg Hx      Blindness Neg Hx      Cataracts Neg Hx      Glaucoma Neg Hx      Hypertension Neg Hx      Macular degeneration Neg Hx      Strabismus Neg Hx      Stroke Neg Hx       Social History     Socioeconomic History    Marital status:    Tobacco Use    Smoking status: Never    Smokeless tobacco: Never   Substance and Sexual Activity    Alcohol use: Yes     Comment: 2-3 times per year.    Drug use: No    Sexual activity: Yes     Partners: Male     Birth control/protection: Surgical     Social Drivers of Health     Financial Resource Strain: Low Risk  (6/12/2024)    Received from [facility]    Overall Financial Resource Strain (CARDIA)     Difficulty of Paying Living Expenses: Not hard at all   Food Insecurity: No Food Insecurity (6/12/2024)    Received from [facility]    Hunger Vital Sign     Worried About Running Out of Food in the Last Year: Never true     Ran Out of Food in the Last Year: Never true   Transportation Needs: No Transportation Needs (6/12/2024)    Received from [facility]    PRAPARE - Transportation     Lack of Transportation (Medical): No     Lack of Transportation (Non-Medical): No   Physical Activity: Patient Unable To Answer (6/12/2024)    Received from [facility]    Exercise Vital Sign     Days of Exercise per Week: Patient unable to answer     Minutes of Exercise per Session: Patient unable to answer   Stress: No  Stress Concern Present (6/12/2024)    Received from [facility]    [facility] [place] [facility] - Occupational Stress Questionnaire     Feeling of Stress : Not at all   Housing Stability: Unknown (12/5/2023)    Housing Stability Vital Sign     Unable to Pay for Housing in the Last Year: Patient refused     Number of Places Lived in the Last Year: 1     Unstable Housing in the Last Year: No     REVIEW OF SYSTEMS:    GENERAL:  No weight loss, malaise or fevers.  HEENT:  Negative for frequent or significant headaches.  NECK:  Negative for lumps, goiter, pain and significant neck swelling.  RESPIRATORY:  Negative for cough, wheezing or shortness of breath.  CARDIOVASCULAR:  Negative for chest pain, leg swelling or palpitations.  GI:  Negative for abdominal discomfort, blood in stools or black stools or change in bowel habits.  MUSCULOSKELETAL:  See HPI. +Back pain.   SKIN:  Negative for lesions, rash, and itching.  PSYCH:  Negative for sleep disturbance, mood disorder and recent psychosocial stressors.  HEMATOLOGY/LYMPHOLOGY:  Negative for prolonged bleeding, bruising easily or swollen nodes.  NEURO:   No history of headaches, syncope, paralysis, seizures or tremors.  All other reviewed and negative other than HPI.       Objective:   /74 (Patient Position: Sitting)   Pulse 78   Temp 97.5 °F (36.4 °C)   Wt 64 kg (141 lb 1.5 oz)   LMP  (LMP Unknown)   BMI 26.66 kg/m²   Pain Disability Index Review:      1/6/2025    10:50 AM 7/25/2024     2:47 PM 7/17/2024     1:42 PM   Last 3 PDI Scores   Pain Disability Index (PDI) 48 54 54     PHYSICAL EXAMINATION:    GENERAL APPEARANCE: Well appearing, in no acute distress. Using Wheelchair for mobility  PSYCH:  Mood and affect appropriate.  SKIN: Skin color, texture, turgor normal, no rashes or lesions to visible areas.  HEAD/FACE:  Normocephalic, atraumatic.  NECK: No pain to palpation over the cervical paraspinous muscles. Spurling Negative. No pain with neck  flexion, extension, or lateral flexion.   CARDIO: Rate regular.  No lower extremity edema. Capillary refill <2 seconds.   PULM: Bilateral chest rise. No apparent respiratory distress.   GI:  Non-distended  BACK: Straight leg raising in the sitting position is negative to radicular pain. Negative SANDRA/FADIR. Positive facet loading bilaterally. Limited ROM with pain on extension.  EXTREMITIES: Peripheral joint ROM is full and pain free without obvious instability or laxity in all four extremities. No deformities, edema, or skin discoloration.   MUSCULOSKELETAL: Bilateral upper and lower extremity strength is normal and symmetric.  No atrophy or tone abnormalities are noted.   NEURO: Bilateral upper and lower extremity coordination and muscle stretch reflexes are physiologic and symmetric.  Negative Arriaga's. Plantar response are downgoing. No clonus noted. No loss of sensation is noted.  GAIT: Antalgic- presents in wheelchair.        Assessment:     Joyce Peterson is a 64 y.o. female with history of RA and s/p L2-pelvic fusion that presents for chronic low back, knee, and hip pain.      Encounter Diagnoses   Name Primary?    Chronic pain disorder Yes    Piriformis syndrome of both sides     S/P lumbar spinal fusion     Postlaminectomy syndrome of lumbar region     Encounter for long-term opiate analgesic use        Plan:   We discussed with the patient the assessment and recommendations. The following is the plan we agreed on:    Previous records and imaging reviewed.   She previously did well decreasing MS Contin to 15 mg Q12h. She has tried skipping a dose but experienced side effects. She will slowly space it out more until she gets to Q24 hours. She will let us know if she has any problems.   reviewed and is appropriate.  Continue to follow up with Neurosurgery and Rheumatology.  Continue with home therapy exercises. She is joining a gym.  RTC in 2 months or sooner if needed.    - Dr. Mayfield was consulted  on the patient and agrees with this plan.    The above plan and management options were discussed at length with patient. Patient is in agreement with the above and verbalized understanding.     Valarie Law, JAYME  01/06/2025

## 2025-01-07 LAB
ALBUMIN SERPL ELPH-MCNC: 3.91 G/DL (ref 3.35–5.55)
ALPHA1 GLOB SERPL ELPH-MCNC: 0.24 G/DL (ref 0.17–0.41)
ALPHA2 GLOB SERPL ELPH-MCNC: 0.48 G/DL (ref 0.43–0.99)
B-GLOBULIN SERPL ELPH-MCNC: 0.67 G/DL (ref 0.5–1.1)
GAMMA GLOB SERPL ELPH-MCNC: 0.49 G/DL (ref 0.67–1.58)
INTERPRETATION SERPL IFE-IMP: NORMAL
KAPPA LC SER QL IA: 1.58 MG/DL (ref 0.33–1.94)
KAPPA LC/LAMBDA SER IA: 0.94 (ref 0.26–1.65)
LAMBDA LC SER QL IA: 1.68 MG/DL (ref 0.57–2.63)
PATHOLOGIST INTERPRETATION IFE: NORMAL
PATHOLOGIST INTERPRETATION SPE: NORMAL
PROT SERPL-MCNC: 5.8 G/DL (ref 6–8.4)

## 2025-01-07 NOTE — PROGRESS NOTES
"[facility] Medicine  Progress Note    Patient Name: Alin Burkett  MRN: [id number]  Patient Class: IP- Inpatient   Admission Date: 10/15/2020  Length of Stay: 7 days  Attending Physician: Julia Summers MD  Primary Care Provider: Carmen Krueger MD        Subjective:     Principal Problem:Septic shock        HPI:  Patient has been having diarrhea with "jelly-like" consistency as well as crampiness in the abdomen.  Symptoms began roughly one week ago.  Also has had fever and malaise.  Appetite poor.  Fatigue as well.  He has been receiving chemo for PTLD; most recent cycle of CHOP was end of last week.  He has history of renal transplant four years ago and is currently on twice-a-day  Prograf.  He's had no cough, no unusual headache, no sinus pain, and no burning on urination.  He feels that he's probably dehydrated.    Overview/Hospital Course:  No notes on file    Interval History:  Continues to have loose stools.  Creatinine trending up.  Continues to be acidotic currently on bicarb.  Afebrile blood pressure stable    Review of Systems   Constitutional: Positive for fatigue. Negative for appetite change, chills and fever.   HENT: Negative for congestion, hearing loss, rhinorrhea, sore throat, trouble swallowing and voice change.    Respiratory: Negative for cough, chest tightness, shortness of breath and wheezing.    Cardiovascular: Negative for chest pain, palpitations and leg swelling.   Gastrointestinal: Positive for abdominal pain and diarrhea. Negative for blood in stool, nausea and vomiting.   Genitourinary: Negative for difficulty urinating, frequency, hematuria and urgency.   Musculoskeletal: Negative for back pain, joint swelling and neck stiffness.   Skin: Negative for pallor and rash.   Neurological: Negative for tremors, seizures, syncope, speech difficulty, weakness, numbness and headaches.   Hematological: Negative for adenopathy.   Psychiatric/Behavioral: Negative for " Subjective:      Patient ID: Joyce Peterson is a 64 y.o. female.    Chief Complaint: Anemia    HPI:      HISTORY OF PRESENT ILLNESS:   64 y.o. female Referred to me November 2017  for ROBERT.  Intolerant to po iron.  Iron studies in march 2017 with severe ID.  History RA followed by Dr. Valverde.  History of gastroparesis followed by Dr. Moore in GI.  Denies fever, chills, nightsweats, bleeding, brusing, lymphadenopathy, signs/symptoms of splenomegaly.   Had upper and lower endoscopy summer 2017 unremarkable.  No VCE done.  Denies any overt GI bleeding or other bleeding.    S/p repeat IV feraheme x 2 January 2018,  may 2019 and oct/nov 2019.  Tolerated well.   Maintained ferritin and hgb since that time. Continues close fu with rheum for RA and osteoperosis.       Interval history 9/5/23: Ms. Peterson presentsfor surveillance fu.   Since last appt required spinal surgeyr for bulging disc and ankel repair.  Some post op bleeding with back surgery.    Recent iron studies showed again ROBERT.  Has received 3 doses of venofer at [facility] last month. Insurance declined injectefer/venofer.   Tolerated well     Interval history 12/5/23: Ms. Peterson presentsfor surveillance fu for ROBERT.    Some fatigue. No bleeding of any kind.     Interval history 3/21/24: Ms. Peterson presentsfor surveillance fu for ROBERT.    Some fatigue. No bleeding of any kind.     Interval History 1/8/25:  Ms. Peterson returns to clinic today for ROBERT surveillance. She is new to me after previously being evaluated by Dr. Woodson.     States she has a history of anemia since she was a teenager but says it has worsened since starting leflunomide for RA. VCE previously discussed with Dr. Woodson to help determine etiology of ROBERT, however she does not desire this or any further work-up for her anemia at this time.     Of note, she had back surgery at  in July 2024 and developed right LE DVT a few days after. She is on Eliquis  agitation, behavioral problems, confusion and sleep disturbance.     Objective:     Vital Signs (Most Recent):  Temp: 97.8 °F (36.6 °C) (10/22/20 1200)  Pulse: 97 (10/22/20 1520)  Resp: (!) 28 (10/22/20 1520)  BP: 104/69 (10/22/20 1520)  SpO2: 96 % (10/22/20 1520) Vital Signs (24h Range):  Temp:  [97.8 °F (36.6 °C)-99 °F (37.2 °C)] 97.8 °F (36.6 °C)  Pulse:  [] 97  Resp:  [15-37] 28  SpO2:  [71 %-100 %] 96 %  BP: ()/(63-91) 104/69     Weight: 78.7 kg (173 lb 8 oz)  Body mass index is 24.2 kg/m².    Intake/Output Summary (Last 24 hours) at 10/22/2020 1531  Last data filed at 10/22/2020 1500  Gross per 24 hour   Intake 3886.39 ml   Output 1125 ml   Net 2761.39 ml      Physical Exam  Vitals signs and nursing note reviewed.   Constitutional:       General: He is not in acute distress.     Appearance: He is ill-appearing. He is not diaphoretic.   HENT:      Head: Normocephalic and atraumatic.      Mouth/Throat:      Mouth: Mucous membranes are dry.   Eyes:      General: No scleral icterus.        Right eye: No discharge.         Left eye: No discharge.      Pupils: Pupils are equal, round, and reactive to light.   Neck:      Vascular: No JVD.   Cardiovascular:      Rate and Rhythm: Normal rate and regular rhythm.   Pulmonary:      Effort: Pulmonary effort is normal.      Breath sounds: Normal breath sounds.   Abdominal:      General: Bowel sounds are normal. There is no distension.      Palpations: Abdomen is soft.      Tenderness: There is abdominal tenderness in the left upper quadrant and left lower quadrant. There is no rebound.   Musculoskeletal:      Right lower leg: No edema.      Left lower leg: No edema.   Skin:     General: Skin is warm.      Capillary Refill: Capillary refill takes less than 2 seconds.      Findings: No rash.   Neurological:      General: No focal deficit present.      Mental Status: He is alert.      Cranial Nerves: No cranial nerve deficit.   Psychiatric:         Mood and Affect:  bid.    She denies f/c/ns; endorses fatigue and ice cravings.     I have reviewed all of the patient's relevant lab work available in the medical record and have utilized this in my evaluation and management recommendations today.      Social History     Socioeconomic History    Marital status:    Tobacco Use    Smoking status: Never    Smokeless tobacco: Never   Substance and Sexual Activity    Alcohol use: Yes     Comment: 2-3 times per year.    Drug use: No    Sexual activity: Yes     Partners: Male     Birth control/protection: Surgical     Social Drivers of Health     Financial Resource Strain: Low Risk  (6/12/2024)    Received from [facility]    Overall Financial Resource Strain (CARDIA)     Difficulty of Paying Living Expenses: Not hard at all   Food Insecurity: No Food Insecurity (6/12/2024)    Received from [facility]    Hunger Vital Sign     Worried About Running Out of Food in the Last Year: Never true     Ran Out of Food in the Last Year: Never true   Transportation Needs: No Transportation Needs (6/12/2024)    Received from [facility]    PRAPARE - Transportation     Lack of Transportation (Medical): No     Lack of Transportation (Non-Medical): No   Physical Activity: Patient Unable To Answer (6/12/2024)    Received from [facility]    Exercise Vital Sign     Days of Exercise per Week: Patient unable to answer     Minutes of Exercise per Session: Patient unable to answer   Stress: No Stress Concern Present (6/12/2024)    Received from [facility]    [employer] [place] [employer] - Occupational Stress Questionnaire     Feeling of Stress : Not at all   Housing Stability: Unknown (12/5/2023)    Housing Stability Vital Sign     Unable to Pay for Housing in the Last Year: Patient refused     Number of Places Lived in the Last Year: 1     Unstable Housing in the Last Year: No       Family History   Problem Relation Name Age of Onset    Cancer Mother 69         Lung Cancer    Emphysema  Mood normal.         Significant Labs:   BMP:   Recent Labs   Lab 10/22/20  0411      *   K 4.2   *   CO2 18*   BUN 34*   CREATININE 2.8*   CALCIUM 7.8*     CBC:   Recent Labs   Lab 10/21/20  0354 10/22/20  0411   WBC 14.81* 12.13   HGB 10.1* 10.3*   HCT 30.0* 30.5*   PLT 76* 115*       Significant Imaging: I have reviewed all pertinent imaging results/findings within the past 24 hours.      Assessment/Plan:      * Septic shock  Source is likely colitis from C.difficile.  Map appears to be above 70.  Will titrate down the Levophed  Empiric antibiotcs: vancomycin and cefepime.  IV and oral vancomycin.  Continue norepinephrine drip.  Lactate level is ok.  Microbiology Results (last 7 days)     Procedure Component Value Units Date/Time    Urine Culture High Risk [[id number]] Collected: 10/20/20 0022    Order Status: Completed Specimen: Urine, Clean Catch Updated: 10/21/20 1928     Urine Culture, Routine No growth    Narrative:      Indicated criteria for high risk culture:->Other  Other (specify):->Neutropenic patient    Blood culture x two cultures. Draw prior to antibiotics. [[id number]] Collected: 10/15/20 1626    Order Status: Completed Specimen: Blood from Peripheral, Right Hand Updated: 10/21/20 0612     Blood Culture, Routine No growth after 5 days.    Narrative:      Aerobic and anaerobic    Blood culture x two cultures. Draw prior to antibiotics. [[id number]] Collected: 10/15/20 1546    Order Status: Completed Specimen: Blood from Antecubital, Right Arm Updated: 10/21/20 0612     Blood Culture, Routine No growth after 5 days.    Narrative:      Aerobic and anaerobic    Clostridium difficile EIA [[id number]]  (Abnormal) Collected: 10/16/20 0358    Order Status: Completed Specimen: Stool Updated: 10/16/20 1057     C. diff Antigen Positive     C difficile Toxins A+B, EIA Positive     Comment: Testing not recommended for children <24 months old.               Acute renal failure superimposed on  Mother 69     Heart attack Mother 69     Alcohol abuse Mother 69     Cancer Father          Lung Cancer    Osteoarthritis Father      Lung cancer Father      Skin cancer Father      Alcohol abuse Father      Heart disease Brother 57     Heart attack Brother 57     Alcohol abuse Brother 57     Cirrhosis Brother 57     Osteoarthritis Paternal Aunt      Retinal detachment Maternal Aunt      No Known Problems Sister      No Known Problems Maternal Uncle      No Known Problems Paternal Uncle      No Known Problems Maternal Grandmother      No Known Problems Maternal Grandfather      No Known Problems Paternal Grandmother      No Known Problems Paternal Grandfather      Colon cancer Neg Hx      Esophageal cancer Neg Hx      Stomach cancer Neg Hx      Celiac disease Neg Hx      Diabetes Neg Hx      Thyroid disease Neg Hx      Amblyopia Neg Hx      Blindness Neg Hx      Cataracts Neg Hx      Glaucoma Neg Hx      Hypertension Neg Hx      Macular degeneration Neg Hx      Strabismus Neg Hx      Stroke Neg Hx         Past Surgical History:   Procedure Laterality Date    BACK SURGERY  06/12/2024    revision of prior back surgery on 07/14/2023    BREAST BIOPSY Left 01/29/2002    core bx    CARPAL TUNNEL RELEASE Right 05/2017    CHOLECYSTECTOMY  2004    COLONOSCOPY      10/11    COLONOSCOPY N/A 06/29/2017    Procedure: COLONOSCOPY;  Surgeon: López Moore MD;  Location: [facility] ENDO ([address] [place] [address]);  Service: Endoscopy;  Laterality: N/A;    EPIDURAL STEROID INJECTION N/A 11/18/2021    Procedure: INJECTION, STEROID, EPIDURAL, L5-S1 IL NEED CONSENT;  Surgeon: Tj Mayfield MD;  Location: [facility] PAIN MGT;  Service: Pain Management;  Laterality: N/A;    EPIDURAL STEROID INJECTION N/A 09/29/2022    Procedure: LUMBAR L3/L4 IL MADELINE CONTRAST;  Surgeon: Tj Mayfield MD;  Location: [facility] PAIN MGT;  Service: Pain Management;  Laterality: N/A;    EPIDURAL STEROID INJECTION N/A 12/12/2022    Procedure: INJECTION, STEROID, EPIDURAL L5/S1 IL CONTRAST;   Surgeon: Tj Mayfield MD;  Location: [facility] PAIN MGT;  Service: Pain Management;  Laterality: N/A;    EPIDURAL STEROID INJECTION N/A 04/06/2023    Procedure: INJECTION, STEROID, EPIDURAL L5/S1;  Surgeon: Tj Mayfield MD;  Location: [facility] PAIN MGT;  Service: Pain Management;  Laterality: N/A;    FOOT ARTHRODESIS Left 01/11/2023    Procedure: FUSION, FOOT;  Surgeon: Ariella Finnegan MD;  Location: [facility] OR;  Service: Orthopedics;  Laterality: Left;  nimbus    FUSION, SPINE, LUMBAR, TLIF, POSTERIOR APPROACH, USING PEDICLE SCREW N/A 06/22/2023    Procedure: FUSION, SPINE, LUMBAR, TLIF, POSTERIOR APPROACH, USING PEDICLE SCREW L4/5 spinewave SNS:SSEP/EMG;  Surgeon: Jh Mosqueda MD;  Location: [facility] OR [address] [place] [address];  Service: Neurosurgery;  Laterality: N/A;    HARVESTING OF BONE GRAFT Left 01/11/2023    Procedure: SURGICAL PROCUREMENT, BONE GRAFT;  Surgeon: Ariella Finnegan MD;  Location: [facility] OR;  Service: Orthopedics;  Laterality: Left;    INJECTION OF ANESTHETIC AGENT AROUND NERVE Bilateral 08/23/2021    Procedure: BLOCK, NERVE, MEDIAL BRANCH L3,L4,L5;  Surgeon: Tj Mayfield MD;  Location: [facility] PAIN MGT;  Service: Pain Management;  Laterality: Bilateral;  1 of 2    INJECTION OF ANESTHETIC AGENT AROUND NERVE Bilateral 08/26/2021    Procedure: BLOCK, NERVE, MEDIAL BRANCH L3,L4,L5;  Surgeon: Tj Mayfield MD;  Location: [facility] PAIN MGT;  Service: Pain Management;  Laterality: Bilateral;  2 of 2    INJECTION OF ANESTHETIC AGENT AROUND NERVE Left 07/07/2022    Procedure: BLOCK, NERVE, LEFT OBTURATOR AND FEMORAL;  Surgeon: Tj Mayfield MD;  Location: [facility] PAIN MGT;  Service: Pain Management;  Laterality: Left;    INJECTION OF FACET JOINT Bilateral 03/12/2020    Procedure: FACET JOINT INJECTION (LUMBAR BLOCK) BILATERAL L4-5 AND L5-S1 DIRECT REFERRAL;  Surgeon: Tj Mayfield MD;  Location: [facility] PAIN MGT;  Service: Pain Management;  Laterality: Bilateral;  NEEDS CONSENT    INJECTION OF FACET JOINT Bilateral 03/29/2021    Procedure:  stage 3 chronic kidney disease  Creatinine trending up   BMP reviewed- noted Estimated Creatinine Clearance: 26.5 mL/min (A) (based on SCr of 2.8 mg/dL (H)). according to latest data.   Monitor UOP and serial BMP and adjust therapy as needed. Renally dose meds.      Moderate malnutrition  Nutrition consulted. Body mass index is 24.2 kg/m².. Encourage maximal PO intake. Diet supplementation ordered per nutrition approval. Will encourage PO and monitor closely for weight changes.      Anemia in stage 3 chronic kidney disease  Patient's anemia is currently controlled. Has not received any PRBCs to date.. Etiology likely d/t  anemia of chronic disease  Current CBC reviewed-   Lab Results   Component Value Date    HGB 10.3 (L) 10/22/2020    HCT 30.5 (L) 10/22/2020     Monitor serial CBC and transfuse if patient becomes hemodynamically unstable, symptomatic or H/H drops below 7/21.         Anemia due to chemotherapy  Patient's anemia is currently controlled. Has not received any PRBCs to date.. Etiology likely d/t chemo  Current CBC reviewed-   Lab Results   Component Value Date    HGB 10.3 (L) 10/22/2020    HCT 30.5 (L) 10/22/2020     Monitor serial CBC and transfuse if patient becomes hemodynamically unstable, symptomatic or H/H drops below 7/21.         Hypokalemia due to excessive gastrointestinal loss of potassium  Resolved Give supplement and monitor level.    Potassium   Date Value Ref Range Status   10/22/2020 4.2 3.5 - 5.1 mmol/L Final   10/21/2020 4.1 3.5 - 5.1 mmol/L Final         Febrile neutropenia  Will treat empirically with  Vancomycin.  Improved  Monitor leukocyte count.  Hematology on the case  Granix given today.;    WBC   Date Value Ref Range Status   10/22/2020 12.13 3.90 - 12.70 K/uL Final       Post-transplant lymphoproliferative disorder (PTLD)  Has been receiving chemo for this.      -donor kidney transplant  Continue Prograf at usual dose.      Acquired hypothyroidism  Continue  INJECTION, FACET JOINT L3/4, L4/5, L5/S1;  Surgeon: Tj Mayfield MD;  Location: [facility] PAIN MGT;  Service: Pain Management;  Laterality: Bilateral;    INJECTION OF JOINT Right 07/30/2020    Procedure: INJECTION, JOINT, RIGHT HIP Interarticular under flouro;  Surgeon: Tj Mayfield MD;  Location: [facility] PAIN MGT;  Service: Pain Management;  Laterality: Right;  INJECTION, JOINT, RIGHT HIP Interarticular under flouro    INJECTION OF JOINT Right 02/21/2022    Procedure: Injection, Joint RIGHT ISCHIAL BURSA;  Surgeon: Tj Mayfield MD;  Location: [facility] PAIN MGT;  Service: Pain Management;  Laterality: Right;    INTRAMEDULLARY RODDING OF FEMUR Left 05/21/2019    Procedure: INSERTION, INTRAMEDULLARY ARIEL, FEMUR;  Surgeon: López Duff MD;  [address]  Service: Orthopedics;  Laterality: Left;    LENGTHENING OF TENDON OF LOWER EXTREMITY Left 01/11/2023    Procedure: LENGTHENING, TENDON, LOWER EXTREMITY;  Surgeon: Ariella Finnegan MD;  Location: [facility];  Service: Orthopedics;  Laterality: Left;    MAGNETIC RESONANCE IMAGING N/A 05/29/2023    Procedure: MRI (Magnetic Resonance Imagine);  Surgeon: Sujey Surgeon;  Location: [facility];  Service: Anesthesiology;  Laterality: N/A;    MAGNETIC RESONANCE IMAGING N/A 01/10/2024    Procedure: MRI (Magnetic Resonance Imagine);  Surgeon: Sujey Robin;  Location: [facility];  Service: Anesthesiology;  Laterality: N/A;    RADIOFREQUENCY ABLATION Left 09/20/2021    Procedure: RADIOFREQUENCY ABLATION left L3,4,5 RFA  1 of 2  CONSENT NEEDED;  Surgeon: Tj Mayfield MD;  Location: [facility] PAIN MGT;  Service: Pain Management;  Laterality: Left;    RADIOFREQUENCY ABLATION Right 10/04/2021    Procedure: RADIOFREQUENCY ABLATION  right L3,4,5 RFA   2 of 2  CONSENT NEEDED;  Surgeon: Tj Mayfield MD;  Location: [facility] PAIN MGT;  Service: Pain Management;  Laterality: Right;    RADIOFREQUENCY ABLATION Left 04/21/2022    Procedure: Radiofrequency Ablation LEFT L3,L4,L5;  Surgeon: Tj Mayfield MD;   levothyroxine.    Metabolic acidosis  Acidosis worsening most likely non and an metabolic acidosis secondary to diarrhea and renal tubular disease  Nephrology on the case  Will continue sodium bicarb      VTE Risk Mitigation (From admission, onward)         Ordered     IP VTE LOW RISK PATIENT  Once      10/15/20 2220                Discharge Planning   TRINH:      Code Status: Full Code   Is the patient medically ready for discharge?:     Reason for patient still in hospital (select all that apply): Patient new problem and Patient trending condition  Discharge Plan A: Home with family            Critical care time spent on the evaluation and treatment of severe organ dysfunction, review of pertinent labs and imaging studies, discussions with consulting providers and discussions with patient/family: 60 minutes.      Julia Summers MD  Department of Hospital Medicine   [facility]   Location: [facility] PAIN [facility]GT;  Service: Pain Management;  Laterality: Left;    RADIOFREQUENCY ABLATION Right 05/12/2022    Procedure: Radiofrequency Ablation RIGHT L3,L4,L5;  Surgeon: Tj Mayfield MD;  Location: [facility] PAIN [facility]GT;  Service: Pain Management;  Laterality: Right;    RADIOFREQUENCY ABLATION Left 07/25/2022    Procedure: Radiofrequency Ablation Left Femoral & Obturator;  Surgeon: Tj Mayfield MD;  Location: [facility] PAIN MGT;  Service: Pain Management;  Laterality: Left;    REPAIR OF TRICEPS TENDON Left 10/17/2018    Procedure: REPAIR, TENDON, TRICEPS left elbow;  Surgeon: Staci Yarbrough MD;  [facility];  Service: Orthopedics;  Laterality: Left;  Anesthesia: General and regional. PRONE, k-wire , hand pan 1 and pan 2, CALL ARTHREX/Tamera notified 10-12 LO    TONSILLECTOMY      TRANSFORAMINAL EPIDURAL INJECTION OF STEROID Right 08/31/2020    Procedure: INJECTION, STEROID, EPIDURAL, TRANSFORAMINAL,  APPROACH, L3-L4 and L4-L5 need consent;  Surgeon: Tj Mayfield MD;  Location: [facility] PAIN MGT;  Service: Pain Management;  Laterality: Right;    TRANSFORAMINAL EPIDURAL INJECTION OF STEROID Bilateral 07/23/2021    Procedure: INJECTION, STEROID, EPIDURAL, TRANSFORAMINAL APPROACH L4/5;  Surgeon: Tj Mayfield MD;  Location: [facility] PAIN MGT;  Service: Pain Management;  Laterality: Bilateral;    TRANSFORAMINAL EPIDURAL INJECTION OF STEROID Bilateral 09/25/2023    Procedure: LUMBAR TRANSFORAMINAL BILATERAL  L2/3 DIRECT REFERRAL;  Surgeon: Tj Mayfield MD;  Location: [facility] PAIN [facility]GT;  Service: Pain Management;  Laterality: Bilateral;    TRANSFORAMINAL EPIDURAL INJECTION OF STEROID Left 10/18/2023    Procedure: LUMBAR TRANSFORAMINAL LEFT L3/4 AND L4/5 * CLEARANCE IN CHART*;  Surgeon: Tj Mayfield MD;  Location: [facility] PAIN [facility]GT;  Service: Pain Management;  Laterality: Left;    TRIGGER POINT INJECTION N/A 07/23/2021    Procedure: INJECTION, TRIGGER POINT SCAPULAR;  Surgeon: Tj Mayfield MD;  Location:  [facility] PAIN MGT;  Service: Pain Management;  Laterality: N/A;    TUBAL LIGATION  2003    UPPER GASTROINTESTINAL ENDOSCOPY      10/11    uterine ablation  2003       Past Medical History:   Diagnosis Date    Acid reflux     Allergy     Anemia     Asthma     Coronary artery disease     CS (cervical spondylosis) 03/08/2013    Degenerative disc disease     Degenerative joint disease of hindfoot, left 6/28/2022    Dry eyes     Dry mouth     Gastroparesis     History of methotrexate therapy 01/19/2022    Hyperlipidemia     Lateral meniscus derangement 04/06/2016    Lobular carcinoma in situ     Lumbar spondylosis 03/08/2013    Osteoarthritis     Osteoporosis     Pes planovalgus, acquired, left 6/28/2022    Rheumatoid arthritis(714.0)     Rupture of left triceps tendon 10/17/2018    Umbilical hernia 08/13/2015       Review of Systems   Constitutional:  Positive for fatigue. Negative for appetite change, chills, diaphoresis, fever and unexpected weight change.   HENT:  Negative for nosebleeds.    Respiratory:  Negative for shortness of breath.    Cardiovascular:  Negative for chest pain.   Gastrointestinal:  Negative for anal bleeding and blood in stool.   Genitourinary:  Negative for hematuria and vaginal bleeding.   Musculoskeletal:  Positive for arthralgias.        Chronic RA   Neurological:  Positive for dizziness and light-headedness. Negative for numbness and headaches.   Hematological:  Negative for adenopathy. Does not bruise/bleed easily.          Medication List with Changes/Refills   Current Medications    ALBUTEROL (PROVENTIL/VENTOLIN HFA) 90 MCG/ACTUATION INHALER    Inhale 2 puffs into the lungs every 6 (six) hours as needed for Wheezing. Rescue    ALBUTEROL-IPRATROPIUM (DUO-NEB) 2.5 MG-0.5 MG/3 ML NEBULIZER SOLUTION    Take 3 mLs by nebulization every 6 (six) hours as needed for Wheezing. Rescue    APIXABAN (ELIQUIS) 5 MG TAB    Take 1 tablet (5 mg total) by mouth 2 (two) times daily.    AZITHROMYCIN (ZITHROMAX  "Z-DEVON) 250 MG TABLET    Take 2 tablets (500 mg) on  Day 1,  followed by 1 tablet (250 mg) once daily on Days 2 through 5.    BD ULTRA-FINE JAIME PEN NEEDLE 32 GAUGE X 5/32" NDLE    For Forteo - inject daily    BENZONATATE (TESSALON PERLES) 100 MG CAPSULE    Take 2 capsules (200 mg total) by mouth 3 (three) times daily as needed for Cough.    BUDESONIDE-FORMOTEROL 160-4.5 MCG (SYMBICORT) 160-4.5 MCG/ACTUATION HFAA    Inhale 2 puffs into the lungs every 12 (twelve) hours.    CALCIUM CITRATE-VITAMIN D3 315-200 MG (CITRACAL+D) 315 MG-5 MCG (200 UNIT) PER TABLET    Take 1 tablet by mouth 2 (two) times daily.     CYCLOSPORINE (RESTASIS) 0.05 % OPHTHALMIC EMULSION    Place 1 drop into both eyes 2 (two) times daily.    DICLOFENAC SODIUM (VOLTAREN) 1 % GEL    APPLY 2 GRAMS TOPICALLY 4 (FOUR) TIMES DAILY AS NEEDED.    FEXOFENADINE (ALLEGRA) 180 MG TABLET    Take 1 tablet (180 mg total) by mouth once daily.    FLUCONAZOLE (DIFLUCAN) 150 MG TAB    Take 150 mg by mouth as needed.    FLUTICASONE PROPIONATE (FLONASE) 50 MCG/ACTUATION NASAL SPRAY    Use 1 spray (50 mcg total) in each nostril once daily.    HYDROMORPHONE (DILAUDID) 4 MG TABLET    Take 0.5 tablets (2 mg total) by mouth every 12 (twelve) hours as needed for Pain.    IBUPROFEN (ADVIL,MOTRIN) 600 MG TABLET    Take one tablet by mouth every 6 hours as needed for pain    IBUPROFEN (ADVIL,MOTRIN) 800 MG TABLET    Take 1 tablet by mouth every 6 to 8 hours as needed for pain    INV PROPULSID 10 MG    Take 2 tablets (20 mg) by mouth 4 (four) times daily. FOR INVESTIGATIONAL USE ONLY. Protocol [id number] Subject ID: [id number].    LEFLUNOMIDE (ARAVA) 20 MG TAB    Take 1 tablet (20 mg total) by mouth once daily.    LEVOFLOXACIN (LEVAQUIN) 500 MG TABLET    Take 500 mg by mouth.    LIDOCAINE (LIDODERM) 5 %    Place 1 patch onto the skin once daily. Remove & Discard patch within 12 hours or as directed by MD    LIFITEGRAST (XIIDRA) 5 % DPET    INSTILL ONE DROP INTO BOTH EYES TWICE A " DAY    MAGIC MOUTHWASH DIPHEN/ANTAC/LIDOC    rinse mouth with 5 ml for 30 seconds and spit out, twice daily    METHENAMINE (HIPREX) 1 GRAM TAB    Take 1 tablet (1 g total) by mouth 2 (two) times daily.    MIRABEGRON (MYRBETRIQ) 25 MG TB24 ER TABLET    Take 1 tablet (25 mg total) by mouth once daily.    MONTELUKAST (SINGULAIR) 10 MG TABLET    Take 1 tablet (10 mg total) by mouth every evening.    MORPHINE (MS CONTIN) 15 MG 12 HR TABLET    Take 1 tablet (15 mg total) by mouth 2 (two) times daily.    NALOXONE (NARCAN) 4 MG/ACTUATION SPRY    4mg by nasal route as needed for opioid overdose; may repeat every 2-3 minutes in alternating nostrils until medical help arrives. Call 911    NAPROXEN (NAPROSYN) 500 MG TABLET    TAKE 1 TABLET BY MOUTH TWICE A DAY AS NEEDED    OMEPRAZOLE (PRILOSEC) 40 MG CAPSULE    Take 1 capsule (40 mg total) by mouth every morning.    OMEPRAZOLE-SODIUM BICARBONATE (ZEGERID) 40-1.1 MG-GRAM PER CAPSULE    TAKE 1 CAPSULE BY MOUTH EVERY DAY IN THE MORNING    OXYCODONE (ROXICODONE) 10 MG TAB IMMEDIATE RELEASE TABLET    Take 1 tablet by mouth every 4 (four) hours as needed (acute post op pain) for up to 7 days Max Daily Amount: 60 mg    PANTOPRAZOLE (PROTONIX) 40 MG TABLET    Take 1 tablet (40 mg total) by mouth 2 (two) times daily.    POTASSIUM CHLORIDE SA (K-DUR,KLOR-CON) 20 MEQ TABLET    Take 1 tablet (20 mEq total) by mouth once daily.    PREDNISONE (DELTASONE) 1 MG TABLET    Take 2 tablets (2 mg total) by mouth once daily.    PREDNISONE (DELTASONE) 5 MG TABLET    Take 1 tablet (5 mg total) by mouth once daily.    PREGABALIN (LYRICA) 150 MG CAPSULE    Take 1 capsule (150 mg total) by mouth 3 (three) times daily.    PROMETHAZINE (PHENERGAN) 25 MG TABLET    Take 1 tablet (25 mg total) by mouth every 6 (six) hours as needed for Nausea.    PROMETHAZINE-DEXTROMETHORPHAN (PROMETHAZINE-DM) 6.25-15 MG/5 ML SYRP    Take 10 mLs by mouth nightly as needed for cough    ROSUVASTATIN (CRESTOR) 20 MG TABLET     TAKE 1 TABLET BY MOUTH EVERY DAY IN THE EVENING    SARILUMAB (KEVZARA) 200 MG/1.14 ML SYRG    Inject 1.14 mL (200 mg total) into the skin every 14 (fourteen) days.    SENNA-DOCUSATE 8.6-50 MG (PERICOLACE) 8.6-50 MG PER TABLET    Take 1 tablet by mouth daily    STIMULANT LAXATIVE PLUS 8.6-50 MG PER TABLET    Take 1 tablet by mouth.    SUCRALFATE (CARAFATE) 1 GRAM TABLET    TAKE 1 TABLET BY MOUTH FOUR TIMES A DAY    TERIPARATIDE (FORTEO) 20 MCG/DOSE (600MCG/2.4ML) PNIJ    Inject 0.08 mLs (20 mcg total) into the skin once daily.    TIZANIDINE (ZANAFLEX) 4 MG TABLET    Take 1 tablet (4 mg total) by mouth every 6 (six) hours as needed (for muscle spasms).    TRAMADOL (ULTRAM) 50 MG TABLET    Take 50 mg by mouth every 6 (six) hours as needed.    TRIAMCINOLONE (NASACORT) 55 MCG NASAL INHALER    2 sprays by Nasal route once daily.        Objective:     Vitals:    01/08/25 1046   BP: 131/61   Pulse: 86   Temp: 98.4 °F (36.9 °C)       Physical Exam    Assessment:     Problem List Items Addressed This Visit          Immunology/Multi System    Rheumatoid arthritis involving multiple sites (Chronic)       Hematology    Acute deep vein thrombosis (DVT) of right peroneal vein       Oncology    Iron deficiency anemia due to chronic blood loss - Primary       Lab Results   Component Value Date    WBC 4.11 01/06/2025    RBC 4.13 01/06/2025    HGB 10.7 (L) 01/06/2025    HCT 33.5 (L) 01/06/2025    MCV 81 (L) 01/06/2025    MCH 25.9 (L) 01/06/2025    MCHC 31.9 (L) 01/06/2025    RDW 15.5 (H) 01/06/2025     01/06/2025    MPV 10.5 01/06/2025    GRAN 2.2 01/06/2025    GRAN 52.5 01/06/2025    LYMPH 1.1 01/06/2025    LYMPH 26.3 01/06/2025    MONO 0.8 01/06/2025    MONO 19.5 (H) 01/06/2025    EOS 0.0 01/06/2025    BASO 0.04 01/06/2025    EOSINOPHIL 0.5 01/06/2025    BASOPHIL 1.0 01/06/2025      Lab Results   Component Value Date     01/06/2025    K 4.5 01/06/2025     01/06/2025    CO2 28 01/06/2025    BUN 12 01/06/2025     CREATININE 0.7 01/06/2025    CALCIUM 9.4 01/06/2025    ANIONGAP 7 (L) 01/06/2025    ESTGFRAFRICA >60.0 06/20/2022    EGFRNONAA >60.0 06/20/2022     Lab Results   Component Value Date    ALT 21 01/06/2025    AST 24 01/06/2025     (H) 09/07/2023    ALKPHOS 112 01/06/2025    BILITOT 0.2 01/06/2025     Lab Results   Component Value Date    IRON 33 01/06/2025    TRANSFERRIN 312 01/06/2025    TIBC 462 (H) 01/06/2025    FESATURATED 7 (L) 01/06/2025    FERRITIN 10 (L) 01/06/2025        Plan:   Iron deficiency anemia, unspecified iron deficiency anemia type  -     CBC w/ DIFF; Standing  -     FERRITIN; Standing  -     IRON AND TIBC; Standing    Rheumatoid arthritis involving multiple sites, unspecified whether rheumatoid factor present    Acute deep vein thrombosis (DVT) of right peroneal vein    Other orders  -     FERUMOXYTOL 510 MG/117 ML D5W (READY TO MIX SYSTEM) IVPB 510 mg  -     EPINEPHrine (EPIPEN) 0.3 mg/0.3 mL pen injection 0.3 mg  -     diphenhydrAMINE injection 50 mg  -     hydrocortisone sodium succinate injection 100 mg  -     heparin, porcine (PF) 100 unit/mL injection flush 500 Units  -     sodium chloride 0.9% flush 10 mL  -     0.9% NaCl 100 mL flush bag      1) Pt with iron deficiency anemia likely due to malabsorption related to GI issues; possible anemia of chronic inflammation from RA and meds contributing. Will schedule Feraheme x 2 with labs 2 months after. Will coordinate surveillance lab work every 6 months with lab work also needed by Dr. Moran. LIZZ[facility] visit in one year.    2) Follows with Rheumatology; on leflunomide    3) Managed by PCP; on Eliquis      BMT Chart Routing      Follow up with physician    Follow up with LIZZETTE 1 year. Labs prior to appt   Provider visit type Benign hem   Infusion scheduling note   Feraheme x 2. Please schedule at [facility] infusion center   Injection scheduling note NA   Labs CBC, ferritin and iron and TIBC   Scheduling:  Preferred lab:  Lab interval:  every 6 months  Labs in 2 months after second iron infusion; then every 6 months. Please schedule lab work in [facility]   NA   Pharmacy appointment No pharmacy appointment needed      Other referrals no referral to Oncology Primary Care needed -  no Massage appointment needed    No additional referrals needed              Collaborating Provider:  Dr. Chikis Hurtado MD    Thank You,  Jodie Jara, FNP-C  Benign Hematology

## 2025-01-08 ENCOUNTER — OFFICE VISIT (OUTPATIENT)
Dept: HEMATOLOGY/ONCOLOGY | Facility: CLINIC | Age: 65
End: 2025-01-08
Payer: MEDICARE

## 2025-01-08 VITALS
WEIGHT: 144.38 LBS | SYSTOLIC BLOOD PRESSURE: 131 MMHG | DIASTOLIC BLOOD PRESSURE: 61 MMHG | HEIGHT: 61 IN | HEART RATE: 86 BPM | TEMPERATURE: 98 F | OXYGEN SATURATION: 96 % | BODY MASS INDEX: 27.26 KG/M2

## 2025-01-08 DIAGNOSIS — I82.451 ACUTE DEEP VEIN THROMBOSIS (DVT) OF RIGHT PERONEAL VEIN: ICD-10-CM

## 2025-01-08 DIAGNOSIS — M06.9 RHEUMATOID ARTHRITIS INVOLVING MULTIPLE SITES, UNSPECIFIED WHETHER RHEUMATOID FACTOR PRESENT: Chronic | ICD-10-CM

## 2025-01-08 DIAGNOSIS — D50.9 IRON DEFICIENCY ANEMIA, UNSPECIFIED IRON DEFICIENCY ANEMIA TYPE: Primary | ICD-10-CM

## 2025-01-08 PROCEDURE — 99999 PR PBB SHADOW E&M-EST. PATIENT-LVL IV: CPT | Mod: PBBFAC,,,

## 2025-01-08 RX ORDER — TRAMADOL HYDROCHLORIDE 50 MG/1
50 TABLET ORAL EVERY 6 HOURS PRN
COMMUNITY

## 2025-01-08 RX ORDER — DIPHENHYDRAMINE HYDROCHLORIDE 50 MG/ML
50 INJECTION INTRAMUSCULAR; INTRAVENOUS ONCE AS NEEDED
OUTPATIENT
Start: 2025-01-29

## 2025-01-08 RX ORDER — EPINEPHRINE 0.3 MG/.3ML
0.3 INJECTION SUBCUTANEOUS ONCE AS NEEDED
OUTPATIENT
Start: 2025-01-29

## 2025-01-08 RX ORDER — HEPARIN 100 UNIT/ML
500 SYRINGE INTRAVENOUS
OUTPATIENT
Start: 2025-01-29

## 2025-01-08 RX ORDER — SODIUM CHLORIDE 0.9 % (FLUSH) 0.9 %
10 SYRINGE (ML) INJECTION
OUTPATIENT
Start: 2025-01-29

## 2025-01-08 RX ORDER — LEVOFLOXACIN 500 MG/1
500 TABLET, FILM COATED ORAL
COMMUNITY

## 2025-01-13 ENCOUNTER — PATIENT MESSAGE (OUTPATIENT)
Dept: RHEUMATOLOGY | Facility: CLINIC | Age: 65
End: 2025-01-13
Payer: MEDICARE

## 2025-01-13 ENCOUNTER — PATIENT MESSAGE (OUTPATIENT)
Dept: GASTROENTEROLOGY | Facility: CLINIC | Age: 65
End: 2025-01-13
Payer: MEDICARE

## 2025-01-13 DIAGNOSIS — M06.9 RHEUMATOID ARTHRITIS: ICD-10-CM

## 2025-01-13 RX ORDER — PANTOPRAZOLE SODIUM 40 MG/1
40 TABLET, DELAYED RELEASE ORAL 2 TIMES DAILY
Qty: 60 TABLET | Refills: 3 | Status: SHIPPED | OUTPATIENT
Start: 2025-01-13 | End: 2025-01-13 | Stop reason: SDUPTHER

## 2025-01-13 RX ORDER — PANTOPRAZOLE SODIUM 40 MG/1
40 TABLET, DELAYED RELEASE ORAL 2 TIMES DAILY
Qty: 180 TABLET | Refills: 3 | Status: SHIPPED | OUTPATIENT
Start: 2025-01-13

## 2025-01-13 RX ORDER — LEFLUNOMIDE 20 MG/1
20 TABLET ORAL DAILY
Qty: 90 TABLET | Refills: 0 | Status: SHIPPED | OUTPATIENT
Start: 2025-01-13 | End: 2025-01-30 | Stop reason: SDUPTHER

## 2025-01-14 ENCOUNTER — TELEPHONE (OUTPATIENT)
Dept: OTOLARYNGOLOGY | Facility: CLINIC | Age: 65
End: 2025-01-14
Payer: MEDICARE

## 2025-01-14 NOTE — TELEPHONE ENCOUNTER
Returned call. Apt rescheduled to  at 11:20AM. Pt thanked me and verbalized understanding.     ----- Message from Jeff sent at 2025  9:20 AM CST -----  Contact: Pt  .Type:  Sooner Apoointment Request   Caller is requesting a sooner appointment.  Caller declined first available appointment listed below.  Caller will not accept being placed on the waitlist and is requesting a message be sent to doctor.  Name of Caller:pt  When is the first available appointment?2025  Symptoms: 6 to 8 week follow up  Would the patient rather a call back or a response via [facility]?  Call back  Best Call Back Number:[phone]  Additional Information: pt needs to reschedule her appt. For Thursday she has a  to attend and nothing in the system until March.

## 2025-01-17 ENCOUNTER — TELEPHONE (OUTPATIENT)
Dept: OPTOMETRY | Facility: CLINIC | Age: 65
End: 2025-01-17
Payer: MEDICARE

## 2025-01-17 NOTE — TELEPHONE ENCOUNTER
----- Message from Bernadette sent at 1/17/2025 12:15 PM CST -----  Type:  Sooner Appointment Request    Caller is requesting a sooner appointment.  Caller declined first available appointment listed below.  Caller will not accept being placed on the waitlist and is requesting a message be sent to doctor.  Name of Caller:pt  When is the first available appointment?04/11/25  Symptoms: refraction, dry eye f/u  Would the patient rather a call back or a response via [facility]? call  Best Call Back Number: [phone]  Additional Information:

## 2025-01-24 ENCOUNTER — PATIENT MESSAGE (OUTPATIENT)
Dept: PAIN MEDICINE | Facility: CLINIC | Age: 65
End: 2025-01-24
Payer: MEDICARE

## 2025-01-24 DIAGNOSIS — G57.03 PIRIFORMIS SYNDROME OF BOTH SIDES: ICD-10-CM

## 2025-01-24 DIAGNOSIS — M79.18 MYOFASCIAL PAIN: ICD-10-CM

## 2025-01-24 DIAGNOSIS — G89.4 CHRONIC PAIN DISORDER: ICD-10-CM

## 2025-01-24 RX ORDER — TIZANIDINE 4 MG/1
4 TABLET ORAL EVERY 6 HOURS PRN
Qty: 120 TABLET | Refills: 1 | Status: SHIPPED | OUTPATIENT
Start: 2025-01-24

## 2025-01-27 RX ORDER — MORPHINE SULFATE 15 MG/1
15 TABLET, FILM COATED, EXTENDED RELEASE ORAL 2 TIMES DAILY
Qty: 60 TABLET | Refills: 0 | Status: SHIPPED | OUTPATIENT
Start: 2025-01-27

## 2025-01-27 NOTE — TELEPHONE ENCOUNTER
Patient requesting refill on MS CONTIN   Last office visit 1/6/24   shows last refill on 12/30/24  Patient does have a pain contract on file with [facility]  Patient last UDS no uds on file was not consistent with current therapy

## 2025-01-28 DIAGNOSIS — E87.6 HYPOKALEMIA: ICD-10-CM

## 2025-01-28 NOTE — TELEPHONE ENCOUNTER
No care due was identified.  [facility] Embedded Care Due Messages. Reference number: [id number].   1/28/2025 12:08:10 PM CST

## 2025-01-28 NOTE — TELEPHONE ENCOUNTER
Refill Routing Note   Medication(s) are not appropriate for processing by [facility] for the following reason(s):        New or recently adjusted medication    [facility] action(s):  Defer               Appointments  past 12m or future 3m with PCP    Date Provider   Last Visit   7/22/2024 Krystal Singleton,    Next Visit   1/29/2025 Krystal Singleton, DO   ED visits in past 90 days: 0        Note composed:1:51 PM 01/28/2025

## 2025-01-29 ENCOUNTER — OFFICE VISIT (OUTPATIENT)
Dept: FAMILY MEDICINE | Facility: CLINIC | Age: 65
End: 2025-01-29
Payer: MEDICARE

## 2025-01-29 VITALS
HEIGHT: 61 IN | HEART RATE: 102 BPM | TEMPERATURE: 98 F | BODY MASS INDEX: 27.06 KG/M2 | OXYGEN SATURATION: 97 % | DIASTOLIC BLOOD PRESSURE: 74 MMHG | WEIGHT: 143.31 LBS | SYSTOLIC BLOOD PRESSURE: 118 MMHG

## 2025-01-29 DIAGNOSIS — T38.0X5A STEROID-INDUCED OSTEOPOROSIS: Chronic | ICD-10-CM

## 2025-01-29 DIAGNOSIS — I82.451 ACUTE DEEP VEIN THROMBOSIS (DVT) OF RIGHT PERONEAL VEIN: ICD-10-CM

## 2025-01-29 DIAGNOSIS — M81.8 STEROID-INDUCED OSTEOPOROSIS: Chronic | ICD-10-CM

## 2025-01-29 DIAGNOSIS — K76.0 NAFLD (NONALCOHOLIC FATTY LIVER DISEASE): ICD-10-CM

## 2025-01-29 DIAGNOSIS — N28.89 URETEROCELE: ICD-10-CM

## 2025-01-29 DIAGNOSIS — J45.20 MILD INTERMITTENT ASTHMA WITHOUT COMPLICATION: ICD-10-CM

## 2025-01-29 DIAGNOSIS — M87.180 OSTEONECROSIS OF JAW DUE TO DRUG: ICD-10-CM

## 2025-01-29 DIAGNOSIS — M48.061 SPINAL STENOSIS, LUMBAR REGION WITHOUT NEUROGENIC CLAUDICATION: ICD-10-CM

## 2025-01-29 DIAGNOSIS — E87.6 HYPOKALEMIA: ICD-10-CM

## 2025-01-29 DIAGNOSIS — D50.0 IRON DEFICIENCY ANEMIA DUE TO CHRONIC BLOOD LOSS: ICD-10-CM

## 2025-01-29 DIAGNOSIS — M35.00 SJOGREN'S SYNDROME, WITH UNSPECIFIED ORGAN INVOLVEMENT: ICD-10-CM

## 2025-01-29 DIAGNOSIS — E05.90 SUBCLINICAL HYPERTHYROIDISM: ICD-10-CM

## 2025-01-29 DIAGNOSIS — G89.29 OTHER CHRONIC PAIN: ICD-10-CM

## 2025-01-29 DIAGNOSIS — M25.551 RIGHT HIP PAIN: Primary | ICD-10-CM

## 2025-01-29 DIAGNOSIS — K21.9 GASTROESOPHAGEAL REFLUX DISEASE WITHOUT ESOPHAGITIS: ICD-10-CM

## 2025-01-29 DIAGNOSIS — B37.81 CANDIDAL ESOPHAGITIS: ICD-10-CM

## 2025-01-29 DIAGNOSIS — I25.10 CORONARY ARTERY DISEASE INVOLVING NATIVE CORONARY ARTERY OF NATIVE HEART WITHOUT ANGINA PECTORIS: ICD-10-CM

## 2025-01-29 DIAGNOSIS — E04.1 THYROID NODULE: Chronic | ICD-10-CM

## 2025-01-29 DIAGNOSIS — E78.00 PURE HYPERCHOLESTEROLEMIA: ICD-10-CM

## 2025-01-29 DIAGNOSIS — K31.84 GASTROPARESIS: ICD-10-CM

## 2025-01-29 DIAGNOSIS — R45.89 ANXIETY ABOUT TREATMENT: ICD-10-CM

## 2025-01-29 DIAGNOSIS — M06.9 RHEUMATOID ARTHRITIS INVOLVING MULTIPLE SITES, UNSPECIFIED WHETHER RHEUMATOID FACTOR PRESENT: Chronic | ICD-10-CM

## 2025-01-29 PROBLEM — D64.9 NORMOCYTIC ANEMIA: Status: RESOLVED | Noted: 2023-06-29 | Resolved: 2025-01-29

## 2025-01-29 PROBLEM — Z91.81 HISTORY OF FALLING: Status: RESOLVED | Noted: 2023-06-29 | Resolved: 2025-01-29

## 2025-01-29 PROBLEM — Z98.1 S/P LUMBAR FUSION: Status: RESOLVED | Noted: 2024-07-31 | Resolved: 2025-01-29

## 2025-01-29 PROBLEM — M79.2 NEUROPATHIC PAIN OF LEFT FOOT: Status: RESOLVED | Noted: 2023-02-03 | Resolved: 2025-01-29

## 2025-01-29 PROBLEM — Z78.9 IMPAIRED MOBILITY AND ACTIVITIES OF DAILY LIVING: Status: RESOLVED | Noted: 2021-10-05 | Resolved: 2025-01-29

## 2025-01-29 PROBLEM — Z74.09 IMPAIRED MOBILITY AND ACTIVITIES OF DAILY LIVING: Status: RESOLVED | Noted: 2021-10-05 | Resolved: 2025-01-29

## 2025-01-29 PROCEDURE — 3008F BODY MASS INDEX DOCD: CPT | Mod: CPTII,S$GLB,, | Performed by: STUDENT IN AN ORGANIZED HEALTH CARE EDUCATION/TRAINING PROGRAM

## 2025-01-29 PROCEDURE — 3074F SYST BP LT 130 MM HG: CPT | Mod: CPTII,S$GLB,, | Performed by: STUDENT IN AN ORGANIZED HEALTH CARE EDUCATION/TRAINING PROGRAM

## 2025-01-29 PROCEDURE — 3078F DIAST BP <80 MM HG: CPT | Mod: CPTII,S$GLB,, | Performed by: STUDENT IN AN ORGANIZED HEALTH CARE EDUCATION/TRAINING PROGRAM

## 2025-01-29 PROCEDURE — 99215 OFFICE O/P EST HI 40 MIN: CPT | Mod: S$GLB,,, | Performed by: STUDENT IN AN ORGANIZED HEALTH CARE EDUCATION/TRAINING PROGRAM

## 2025-01-29 PROCEDURE — 1160F RVW MEDS BY RX/DR IN RCRD: CPT | Mod: CPTII,S$GLB,, | Performed by: STUDENT IN AN ORGANIZED HEALTH CARE EDUCATION/TRAINING PROGRAM

## 2025-01-29 PROCEDURE — 1159F MED LIST DOCD IN RCRD: CPT | Mod: CPTII,S$GLB,, | Performed by: STUDENT IN AN ORGANIZED HEALTH CARE EDUCATION/TRAINING PROGRAM

## 2025-01-29 PROCEDURE — 99999 PR PBB SHADOW E&M-EST. PATIENT-LVL V: CPT | Mod: PBBFAC,,, | Performed by: STUDENT IN AN ORGANIZED HEALTH CARE EDUCATION/TRAINING PROGRAM

## 2025-01-29 PROCEDURE — G2211 COMPLEX E/M VISIT ADD ON: HCPCS | Mod: S$GLB,,, | Performed by: STUDENT IN AN ORGANIZED HEALTH CARE EDUCATION/TRAINING PROGRAM

## 2025-01-29 RX ORDER — DIAZEPAM 10 MG/1
10 TABLET ORAL NIGHTLY
Qty: 2 TABLET | Refills: 0 | Status: SHIPPED | OUTPATIENT
Start: 2025-01-29

## 2025-01-29 RX ORDER — TERIPARATIDE 250 UG/ML
20 INJECTION, SOLUTION SUBCUTANEOUS DAILY
Qty: 2.4 ML | Refills: 11 | Status: ACTIVE | OUTPATIENT
Start: 2025-01-29 | End: 2026-01-29

## 2025-01-29 RX ORDER — POTASSIUM CHLORIDE 20 MEQ/1
20 TABLET, EXTENDED RELEASE ORAL DAILY
Qty: 90 TABLET | Refills: 3 | Status: SHIPPED | OUTPATIENT
Start: 2025-01-29

## 2025-01-29 NOTE — PROGRESS NOTES
Subjective:       Patient ID: Joyce Peterson is a 64 y.o. female.    Chief Complaint: Follow-up (Pt here for a 3 month follow up )    Requires valium for MRI 2/2 severe claustrophobia  No longer with evidence DVT ok to stop eliquis   Right hip pain lately similar to when she has fractured hip in past; will get xray today      Review of Systems   All other systems reviewed and are negative.       A1C:  Recent Labs   Lab 03/15/22  1507   Hemoglobin A1C 5.2     CBC:  Recent Labs   Lab 10/14/24  1425 01/06/25  1037 01/24/25  1525   WBC 4.24 4.11 4.31   RBC 4.11 4.13 4.14   Hemoglobin 10.8 L 10.7 L 10.8 L   Hematocrit 34.4 L 33.5 L 34.8 L   Platelets 238 283 290   MCV 84 81 L 84   MCH 26.3 L 25.9 L 26.1 L   MCHC 31.4 L 31.9 L 31.0 L     CMP:  Recent Labs   Lab 01/06/25  1037 01/17/25  1328 01/24/25  1525   Glucose 94 132 H 89   Calcium 9.4 10.0 9.5   Albumin 3.8  --  3.8   Albumin Level  --  4.2  --    Total Protein 6.1  --  6.4   Sodium 142 142 140   Potassium 4.5 4.7 3.8   CO2 28  --  22 L   Carbon Dioxide  --  27  --    Chloride 107  --  108   BUN 12  --  14   Blood Urea Nitrogen  --  14  --    Creatinine 0.7 0.78 0.7   Alkaline Phosphatase 112 126 129   ALT 21 16 28   AST 24 18 27   Total Bilirubin 0.2 0.2 0.3     LIPIDS:  Recent Labs   Lab 10/03/22  0849 10/18/22  1133 04/08/24  0814 07/02/24  1019   TSH  --    < >  --  1.090   HDL 76 H  --  77 H  --    Cholesterol 183  --  178  --    Triglycerides 127  --  109  --    LDL Cholesterol 81.6  --  79.2  --    HDL/Cholesterol Ratio 41.5  --  43.3  --    Non-HDL Cholesterol 107  --  101  --    Total Cholesterol/HDL Ratio 2.4  --  2.3  --     < > = values in this interval not displayed.     TSH:  Recent Labs   Lab 10/18/22  1133 07/02/24  1019   TSH 0.619 1.090        Objective:      Vitals:    01/29/25 1339   BP: 118/74   Pulse: 102   Temp: 97.7 °F (36.5 °C)      Physical Exam  Vitals reviewed.   Constitutional:       Appearance: Normal appearance. She is normal  weight.   HENT:      Head: Normocephalic and atraumatic.   Eyes:      Conjunctiva/sclera: Conjunctivae normal.   Cardiovascular:      Rate and Rhythm: Normal rate and regular rhythm.      Heart sounds: Normal heart sounds.   Pulmonary:      Effort: Pulmonary effort is normal.      Breath sounds: Normal breath sounds.   Abdominal:      Palpations: Abdomen is soft.      Tenderness: There is no abdominal tenderness.   Musculoskeletal:         General: Normal range of motion.      Cervical back: Normal range of motion.      Right lower leg: No edema.      Left lower leg: No edema.   Neurological:      Mental Status: She is alert. Mental status is at baseline.      Motor: Weakness present.      Coordination: Coordination abnormal.      Gait: Gait abnormal.   Psychiatric:         Mood and Affect: Mood is anxious. Affect is tearful.         Behavior: Behavior normal.          Assessment:       1. Right hip pain    2. Anxiety about treatment    3. Spinal stenosis, lumbar region without neurogenic claudication    4. Other chronic pain    5. Mild intermittent asthma without complication    6. Pure hypercholesterolemia    7. Coronary artery disease involving native coronary artery of native heart without angina pectoris    8. Ureterocele    9. Hypokalemia    10. Candidal esophagitis    11. Sjogren's syndrome, with unspecified organ involvement    12. Rheumatoid arthritis involving multiple sites, unspecified whether rheumatoid factor present    13. Acute deep vein thrombosis (DVT) of right peroneal vein    14. Iron deficiency anemia due to chronic blood loss    15. Thyroid nodule    16. Subclinical hyperthyroidism    17. Steroid-induced osteoporosis    18. NAFLD (nonalcoholic fatty liver disease)    19. Gastroesophageal reflux disease without esophagitis    20. Gastroparesis    21. 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        Plan:     Problem List Items Addressed This Visit          Neuro    Spinal stenosis, lumbar region without  neurogenic claudication    Overview     S/p fusion  Slowly improving          Chronic pain    Overview     Continues with pain   Trying to limit use oxycocone  Not able to take NSAIDs per neurosurg to help with healing x 1 year minimum             Pulmonary    Mild intermittent asthma without complication    Overview     Follows with pulm  Albuterol PRN  Symbicort BID  PPI daily   Patient with difficult to control triggers which include reflux with esophogeal dysmotility and allergic rhinitis.  Unfortunately, these triggers are difficult to control and are likely etiology of chronic cough with only mild intermittent asthma.                Cardiac/Vascular    Hyperlipidemia    Overview     Well controlled  Crestor 20   Asymptomatic          Coronary artery disease involving native coronary artery of native heart without angina pectoris    Overview     Managed per cards   Statin   Asymptomatic             Renal/    Ureterocele    Overview     Follows with urogyn  Methenamine   mirabegron         Hypokalemia    Overview     20 meQ BID              ID    Candidal esophagitis    Overview     Sees GI routinely  likely 2/2 chronic steroid use  On PPI   Diflucan as needed             Immunology/Multi System    Rheumatoid arthritis involving multiple sites (Chronic)    Overview     Managed per rheum   erosive RA discontinued long term methotrexate b/o nausea with po and sc and reduced dose, allergic to sulfa, and had reaction to hydroxychloroquine;  Has failed 3 TNFi(infliximab, etanercept, adalimumab), abatacept, hypogamma with rituximab and gets frequent infections in any event, acute vertigo/dizziness with tocilizumab x2  Failed tofacitinib  Currently on Sarilumab    Future option would be upadacitinib         Sjogren's syndrome    Overview     Follows with rheum   On xiidra and restasis for eyes  Leflunomide daily             Hematology    Acute deep vein thrombosis (DVT) of right peroneal vein    Overview     Ok to  stop eliquis   DVT cleared             Oncology    Iron deficiency anemia due to chronic blood loss    Overview     PRN iron infusion per heme             Endocrine    Thyroid nodule (Chronic)    Overview     Follows with endo          Steroid-induced osteoporosis (Chronic)    Overview     Manage dper endo and rheum   + jaw 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 bisphosphonate use (prolia) per endodontist, just completed abx (long course) and pain is much better so will monitor for now   On Forteo now          Subclinical hyperthyroidism    Overview     Follows with endo  Asymptomatic             GI    NAFLD (nonalcoholic fatty liver disease)    Overview     Weight loss advised  Difficult due to inability to exercise 2/2 chronic pain          GERD (gastroesophageal reflux disease)    Overview     PPI daily   stable         Gastroparesis    Overview     Follows with GI   Stable  On carafate, PPI x 2            Orthopedic    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    Overview     OFF Prolia  Now on Forteo   Surgery planned; will require IV abx as well  Follows with endodontist and ID          Other Visit Diagnoses       Right hip pain    -  Primary    Relevant Orders    X-Ray Hip 2 or 3 views Right with Pelvis when performed    Anxiety about treatment        Relevant Medications    diazePAM (VALIUM) 10 MG Tab          Labs reviewed in detail  Problem list reviewed in detail   Xray hip today   RX 2 valium for when has MRI  STOP eliquis   Continue healthy lifestyle efforts  Continue current meds as prescribed otherwise; refills per request  Keep routine specialist f/u   RTC in 3 months  with labs prior and/or PRN          Krystal Singleton   [facility]   1/29/25     I spent a total of 61 minutes on the day of the visit.This includes face to face time and non-face to face time preparing to see the patient (eg, review of tests), obtaining and/or reviewing separately obtained history, documenting clinical information in the electronic or  other health record, independently interpreting results and communicating results to the patient/family/caregiver, or care coordinator.

## 2025-01-30 ENCOUNTER — OFFICE VISIT (OUTPATIENT)
Dept: RHEUMATOLOGY | Facility: CLINIC | Age: 65
End: 2025-01-30
Payer: MEDICARE

## 2025-01-30 VITALS
DIASTOLIC BLOOD PRESSURE: 70 MMHG | HEIGHT: 61 IN | HEART RATE: 94 BPM | SYSTOLIC BLOOD PRESSURE: 123 MMHG | BODY MASS INDEX: 27.18 KG/M2 | WEIGHT: 143.94 LBS

## 2025-01-30 DIAGNOSIS — M06.9 RHEUMATOID ARTHRITIS: ICD-10-CM

## 2025-01-30 DIAGNOSIS — Z01.818 PRE-OP EXAMINATION: ICD-10-CM

## 2025-01-30 DIAGNOSIS — M06.9 RHEUMATOID ARTHRITIS, INVOLVING UNSPECIFIED SITE, UNSPECIFIED WHETHER RHEUMATOID FACTOR PRESENT: Primary | ICD-10-CM

## 2025-01-30 DIAGNOSIS — M19.011 OSTEOARTHRITIS OF RIGHT SHOULDER, UNSPECIFIED OSTEOARTHRITIS TYPE: ICD-10-CM

## 2025-01-30 DIAGNOSIS — M19.90 OSTEOARTHRITIS, UNSPECIFIED OSTEOARTHRITIS TYPE, UNSPECIFIED SITE: ICD-10-CM

## 2025-01-30 DIAGNOSIS — M25.551 RIGHT HIP PAIN: ICD-10-CM

## 2025-01-30 DIAGNOSIS — M17.11 PRIMARY OSTEOARTHRITIS OF RIGHT KNEE: ICD-10-CM

## 2025-01-30 PROCEDURE — 99214 OFFICE O/P EST MOD 30 MIN: CPT | Mod: S$GLB,,, | Performed by: INTERNAL MEDICINE

## 2025-01-30 PROCEDURE — 99999 PR PBB SHADOW E&M-EST. PATIENT-LVL V: CPT | Mod: PBBFAC,,, | Performed by: INTERNAL MEDICINE

## 2025-01-30 PROCEDURE — 3078F DIAST BP <80 MM HG: CPT | Mod: CPTII,S$GLB,, | Performed by: INTERNAL MEDICINE

## 2025-01-30 PROCEDURE — 1159F MED LIST DOCD IN RCRD: CPT | Mod: CPTII,S$GLB,, | Performed by: INTERNAL MEDICINE

## 2025-01-30 PROCEDURE — 3008F BODY MASS INDEX DOCD: CPT | Mod: CPTII,S$GLB,, | Performed by: INTERNAL MEDICINE

## 2025-01-30 PROCEDURE — 3074F SYST BP LT 130 MM HG: CPT | Mod: CPTII,S$GLB,, | Performed by: INTERNAL MEDICINE

## 2025-01-30 RX ORDER — SARILUMAB 200 MG/1.14ML
200 INJECTION, SOLUTION SUBCUTANEOUS
Qty: 2.28 ML | Refills: 2 | Status: SHIPPED | OUTPATIENT
Start: 2025-01-30

## 2025-01-30 RX ORDER — LEFLUNOMIDE 20 MG/1
20 TABLET ORAL DAILY
Qty: 90 TABLET | Refills: 0 | Status: SHIPPED | OUTPATIENT
Start: 2025-01-30

## 2025-01-30 RX ORDER — DICLOFENAC SODIUM 10 MG/G
GEL TOPICAL
Qty: 300 G | Refills: 2 | Status: SHIPPED | OUTPATIENT
Start: 2025-01-30

## 2025-01-30 RX ORDER — PREDNISONE 1 MG/1
1 TABLET ORAL DAILY
Qty: 90 TABLET | Refills: 1 | Status: SHIPPED | OUTPATIENT
Start: 2025-01-30

## 2025-01-30 NOTE — PATIENT INSTRUCTIONS
Ref to Sports Medicine Dr. Candida Small for right hip and OA knees   *Covid vaccine   *Prevnar 20  *RSV vaccine  *Tdap   Ref to  PT quad strengthening, walker isometrics  Ref to OT for secondary OA hands  Cont  Kevzara 200mg sc q 14 days  Continue leflunomide 20mg daily  Continue prednisone   6mg daily  Continue teriparatide as resumed by Dr. Lane Jan 2024 in place of denosumab given denosumab related ONJ  Continue 1200mg dietary calcium daily   RTC 3 months with standing labs

## 2025-01-30 NOTE — PROGRESS NOTES
Subjective:      Patient ID: Joyce Peterson is a 64 y.o. female.    Chief Complaint: RA; osteoporosis    HPI major problem is right hip pain, and right shoulder  Needs surgery for ONJ/osteomyelitis of jaw. Needs another  6 wk course of antibiotics  prior to surgery . She is aware she needs to hold sarilumab(Kevzara) while on IV antibiotics and resume 2 wks post op if wounds healed  Review of Systems   Constitutional:  Positive for fatigue (6/10). Negative for appetite change, fever and unexpected weight change.   HENT:  Negative for mouth sores.         Dry mouth   Eyes:  Negative for visual disturbance.        Dry     Respiratory:  Negative for cough, shortness of breath and wheezing.    Cardiovascular:  Negative for chest pain and palpitations.   Gastrointestinal:  Positive for diarrhea. Negative for abdominal pain, anal bleeding, blood in stool, constipation, nausea and vomiting.   Genitourinary:  Negative for dysuria, frequency and urgency.   Musculoskeletal:  Negative for arthralgias, back pain, gait problem, joint swelling, myalgias, neck pain and neck stiffness.   Skin:  Negative for rash.   Neurological:  Negative for weakness, numbness and headaches.   Hematological:  Negative for adenopathy. Does not bruise/bleed easily.   Psychiatric/Behavioral:  Negative for sleep disturbance. The patient is not nervous/anxious.         Objective:   LMP  (LMP Unknown)   Physical Exam   Constitutional: normal appearance.   Pulmonary/Chest: Effort normal and breath sounds normal. No stridor. No respiratory distress. She has no wheezes. She has no rhonchi. She has no rales.   Musculoskeletal:      Right shoulder: Tenderness present.      Left shoulder: Tenderness present.   Psychiatric: Her behavior is normal. Mood, judgment and thought content normal.       Right Side Rheumatological Exam     The patient is tender to palpation of the shoulder    The patient has an enlarged wrist, knee, 1st PIP, 1st MCP, 2nd PIP, 2nd  MCP, 3rd PIP, 3rd MCP, 4th PIP, 4th MCP and 5th PIP    Hip Exam   Tenderness Location: lateral    Range of Motion   The patient has normal right hip ROM.    Left Side Rheumatological Exam     The patient is tender to palpation of the shoulder.    The patient has an enlarged wrist, knee, 1st PIP, 1st MCP, 2nd PIP, 2nd MCP, 3rd PIP, 3rd MCP, 4th PIP, 4th MCP, 5th PIP and 5th MCP.           10/6/2022 9/6/2023 4/10/2024 7/15/2024   Tender (GURROLA-28) 1 / 28 2 / 28 0 / 28  0 / 28    Swollen (GURRLOA-28) 0 / 28 1 / 28  0 / 28 2 / 28    Provider Global 10 / 100 10 / 100 5 / 100 10 / 100   Patient Global 75 / 100 50 / 100 -- 85 / 100   ESR 2 mm/hr 8 mm/hr 2 mm/hr 2 mm/hr   CRP 0.3 mg/L 0.3 mg/L -- 0.2 mg/L   GURROLA-28 (ESR) 2.1 (Remission) 3.23 (Moderate disease activity) -- 2.07 (Remission)   GURROLA-28 (CRP) 2.66 (Low disease activity) 2.83 (Low disease activity) -- 2.61 (Low disease activity)   CDAI Score 9.5  9  -- 11.5          Latest Reference Range & Units 01/24/25 15:25   WBC 3.90 - 12.70 K/uL 4.31   RBC 4.00 - 5.40 M/uL 4.14   Hemoglobin 12.0 - 16.0 g/dL 10.8 (L)   Hematocrit 37.0 - 48.5 % 34.8 (L)   MCV 82 - 98 fL 84   MCH 27.0 - 31.0 pg 26.1 (L)   MCHC 32.0 - 36.0 g/dL 31.0 (L)   RDW 11.5 - 14.5 % 18.5 (H)   Platelet Count 150 - 450 K/uL 290   MPV 9.2 - 12.9 fL 10.7   Gran % 38.0 - 73.0 % 47.7   Lymph % 18.0 - 48.0 % 34.6   Mono % 4.0 - 15.0 % 16.0 (H)   Eos % 0.0 - 8.0 % 0.5   Basophil % 0.0 - 1.9 % 1.2   Immature Granulocytes 0.0 - 0.5 % 0.2   Gran # (ANC) 1.8 - 7.7 K/uL 2.1   Lymph # 1.0 - 4.8 K/uL 1.5   Mono # 0.3 - 1.0 K/uL 0.7   Eos # 0.0 - 0.5 K/uL 0.0   Baso # 0.00 - 0.20 K/uL 0.05   Immature Grans (Abs) 0.00 - 0.04 K/uL 0.01   nRBC 0 /100 WBC 0   Differential Method  Automated   Sed Rate 0 - 20 mm/Hr 3   (L): Data is abnormally low  (H): Data is abnormally high   Latest Reference Range & Units 01/24/25 15:25   Sodium 136 - 145 mmol/L 140   Potassium 3.5 - 5.1 mmol/L 3.8   Chloride 95 - 110 mmol/L 108   CO2 23 - 29  mmol/L 22 (L)   Anion Gap 8 - 16 mmol/L 10   BUN 8 - 23 mg/dL 14   Creatinine 0.5 - 1.4 mg/dL 0.7   eGFR >60 mL/min/1.73 m^2 >60.0   Glucose 70 - 110 mg/dL 89   Calcium 8.7 - 10.5 mg/dL 9.5   ALP 40 - 150 U/L 129   PROTEIN TOTAL 6.0 - 8.4 g/dL 6.4   Albumin 3.5 - 5.2 g/dL 3.8   BILIRUBIN TOTAL 0.1 - 1.0 mg/dL 0.3   AST 10 - 40 U/L 27   ALT 10 - 44 U/L 28   (L): Data is abnormally low   Latest Reference Range & Units 01/24/25 15:25   CRP 0.0 - 8.2 mg/L <0.3   EXAM:  XR HIP WITH PELVIS WHEN PERFORMED 2 OR 3 VIEWS RIGHT     CLINICAL HISTORY:  Right hip pain     FINDINGS:  3 views.  Previous fusion of the lumbar spine extending through the SI joints.     Prior ORIF of the proximal left femur.  No acute fractures or dislocations.  Right hip appears unremarkable.        Impression:   Unremarkable right hip.     Finalized on: 1/30/2025 4:48 AM By:  Mo Hitchcock MD  [facility]# [id number]      2025-01-30 04:50:[id number]     [facility]    To my reading mild-moderate narrowing axial portion right hip       Narrative & Impression  EXAMINATION:  MRI HIP WITHOUT CONTRAST RIGHT     CLINICAL HISTORY:  Hip pain, chronic, impingement suspected, xray done;  Pain in right hip     TECHNIQUE:  Multiplanar, multisequence imaging of the pelvis and   hip without the use of contrast     COMPARISON:  05/15/2023 and 05/12/2023     FINDINGS:  Osseous Structures: No fracture, or avascular necrosis.No marrow signal abnormality to suggest bone marrow replacement process.     Hip and Sacroiliac joints: Small joint effusion.  No gross abnormalities of the acetabular paula are shown.  MR arthrogram would be necessary for complete evaluation of the paula, if clinically indicated.     Bursae: No trochanteric or iliopsoas bursitis.     Soft Tissues: Muscles and tendons of the pelvis and both hips show no atrophy, edema, mass, tears or other abnormalities.     Impression:     Small RIGHT hip effusion.  No evidence for occult osseous injury or muscular/tendinous injury.         Electronically signed by:Miguel Roman MD  Date:                                            05/29/2023  Time:                                           15:22      EXAMINATION:  MRI HIP WITHOUT CONTRAST RIGHT     CLINICAL HISTORY:  Hip trauma, fracture suspected, xray done;  Pain in right hip     TECHNIQUE:  MRI of the right hip, without intravenous contrast.     COMPARISON:  CT hip 04/13/2023     FINDINGS:  ENTIRE PELVIS: Advanced degenerative changes in the lower lumbar spine.  Postoperative changes from ORIF of the left proximal femur with a TFN system.  Susceptibility artifact degrades evaluation of adjacent structures.  No new fracture.  No sacroiliitis or marrow replacing lesion. Small subserosal fibroid at the uterine fundus.  No other visceral pelvic soft tissue abnormality.     RIGHT HIP:     BONE: No fracture, osteonecrosis, or marrow replacing lesion.     JOINT: Mild degenerative changes with small regions of cartilage loss and small marginal osteophytes.  Small joint effusion/synovitis.  No loose bodies.     LABRUM: Nondisplaced anterosuperior labral tear (6:21 and 7:20).     TENDONS: The iliopsoas and rectus femoris tendons are intact.  Partial tear of the gluteus minimus and medius tendons at the greater trochanter attachment.  Small volume greater trochanteric bursal fluid.  Subtotal tear of the right proximal hamstring tendon at the ischial tuberosity attachment.     SOFT TISSUES: Fatty atrophy gluteus minimus and medius.  Narrowing of the ischiofemoral space, measuring 1.2 cm, with edema and partial tear of the quadratus femoris muscle (7:7).  The sciatic nerve is unremarkable.     Impression:     NO FRACTURE.     Mild right hip degenerative changes.     Anterosuperior labral tear.     Partial tears of the gluteus minimus and medius tendons, with greater trochanteric bursitis.     Subtotal tear of the proximal hamstring tendon.     Signs of ischiofemoral impingement.     Other findings as  described.        Electronically signed by:Salo Aguilar  Date:                                            04/26/2023  Time:                                           08:57  Assessment:       RA has been in remission  Major issue at present is recurrent left lumbar radiculopathy and now intermittent right lumbar radiculopathy. Has been told will need repeat lumbar surgery after 6 wks of antibiotics for ONJ/osteomyelitis right lower molar/maxillary area     Other main issue is right lower molar ONJ, bone biopsy 3/14/24 with cultures positive for oxacillin resistant Staph. Ep, Streptococcus ESBL E. Coli  Dr. Deirdre Bernstein plans 6 wk course of daptomycin 8mg/kg IV daily and ertapenem 1g IV daily  via PICC line        Dr. Lane ordered another course of teriparatide in Jan but pt just recently started.        erosive RA discontinued long term methotrexate b/o nausea with po and sc and reduced dose  , allergic to sulfa,   and had reaction to hydroxychloroquine;  Has failed 3 TNFi(infliximab, etanercept, adalimumab),   Abatacept ineffective   Rituximab: hypogammaglobulinemia and frequent infections in any event, acute vertigo/dizziness with tocilizumab x2  Failed tofacitinib  Currently on Sarilumab, leflunomide     Given history of post op DVT and age best to avoid upadacitinib            RA  TJ 2  SJ 0  Pt global  75  ESR 2 CRP <0.3 DAS28 2.61(LDA) [id number]  2.87(LDA)  CDAI 10.5(MDA) driven by OA hips and knees post  lumbar surgery     Rheumatoid Arthritis Treatment Goals:  -Disease Activity Measure:CDAI   -Target: LDA  -Patient Goal:  -Therapy/Change: none, activity scores driven by back symptoms, neck symptoms, fibromyalgia , right shoulder, OA right knee, not RA.-Shared Decision Making: Discussed goals of care and therapy plan together with patient as outlined above.        Secondary Sjogren's  Osteoporosis DXA 12/20/23 : normal BMD but  with FRAX : hip 1.8% major 25%  : completed 2 years of teriparatide added  to denosumab, then  denosumab alone but ONJ and denosumab stopped(last dose 12/8/23) teriparatide resumed per Dr. Lane  ONJ while on denosumab  S/p L4-5 interbody fusion as above 6/22/23 with recurrent left lumbar and now right lumbar radiculopathy, with planned revision surgery   Subclinical hyperthyroidism due for TSH  Thyroid nodule  Fibromyalgia;   OA knees  Right hip pain with small effusion, mild degenerative changes, anterior-superior labral tear, partial tears gluteus minimus and medius tendons, greater trochanteric bursiti, subtotal tear proximal hamstring on MRI 4/25/23  Right shoulder pain with rotator cuff tendinitis ?tear  OA with Right knee effusion  Lipids  LDL 79.2  4/8/24 on rosuvastatin 20mg nightly  Overweight  Body mass index is 27.2 kg/m².    Plan:   Hold sarilumab(Kevzara) until completes  6 wks of antibiotics and 2 wks post op  Cervical spine x-ray flex-ext to check for AAS preop  Ref to Sports Medicine Dr. Candida Small for right hip and OA knee  *Covid vaccine   *Prevnar 20  *RSV vaccine  *Tdap   Ref to  PT for right hip and knee, right shoulder  Ref to OT for secondary OA hands  Cont  Kevzara 200mg sc q 14 days  Continue leflunomide 20mg daily  Continue prednisone   6 mg daily  Continue teriparatide as resumed by Dr. Lane Jan 2024 in place of denosumab given denosumab related ONJ  Continue 1200mg dietary calcium daily   RTC 3 months with standing labs

## 2025-01-31 ENCOUNTER — OFFICE VISIT (OUTPATIENT)
Dept: PODIATRY | Facility: CLINIC | Age: 65
End: 2025-01-31
Payer: MEDICARE

## 2025-01-31 VITALS — HEIGHT: 61 IN | WEIGHT: 143.31 LBS | BODY MASS INDEX: 27.06 KG/M2

## 2025-01-31 DIAGNOSIS — Z87.2 HEALED ULCER OF RIGHT FOOT: Primary | ICD-10-CM

## 2025-01-31 DIAGNOSIS — M79.2 NEUROPATHIC PAIN OF LEFT FOOT: ICD-10-CM

## 2025-01-31 DIAGNOSIS — M05.79 RHEUMATOID ARTHRITIS INVOLVING MULTIPLE SITES WITH POSITIVE RHEUMATOID FACTOR: ICD-10-CM

## 2025-01-31 DIAGNOSIS — M20.41 HAMMER TOE OF RIGHT FOOT: ICD-10-CM

## 2025-01-31 PROCEDURE — 3008F BODY MASS INDEX DOCD: CPT | Mod: CPTII,S$GLB,, | Performed by: PODIATRIST

## 2025-01-31 PROCEDURE — 99213 OFFICE O/P EST LOW 20 MIN: CPT | Mod: S$GLB,,, | Performed by: PODIATRIST

## 2025-01-31 PROCEDURE — 1159F MED LIST DOCD IN RCRD: CPT | Mod: CPTII,S$GLB,, | Performed by: PODIATRIST

## 2025-01-31 PROCEDURE — 99999 PR PBB SHADOW E&M-EST. PATIENT-LVL V: CPT | Mod: PBBFAC,,, | Performed by: PODIATRIST

## 2025-01-31 RX ORDER — DICLOFENAC SODIUM 10 MG/G
2 GEL TOPICAL 4 TIMES DAILY
Qty: 100 G | Refills: 2 | Status: SHIPPED | OUTPATIENT
Start: 2025-01-31 | End: 2025-02-16

## 2025-01-31 NOTE — PROGRESS NOTES
[facility] - PODIATRY  [address] [place] [address], [address]  [facility] [address]  Dept: [phone]  Dept Fax: [phone]    Isiah Mark Jr., MARSHALL     Assessment:   MDM    Coding  1. Healed ulcer of right foot        2. Hammer toe of right foot  diclofenac sodium (VOLTAREN) 1 % Gel      3. Rheumatoid arthritis involving multiple sites with positive rheumatoid factor  diclofenac sodium (VOLTAREN) 1 % Gel      4. Neuropathic pain of left foot            Plan:     Procedures  Joyce was seen today for toe pain.    Diagnoses and all orders for this visit:    Healed ulcer of right foot    Hammer toe of right foot  -     diclofenac sodium (VOLTAREN) 1 % Gel; Apply 2 g topically 4 (four) times daily. for 10 days    Rheumatoid arthritis involving multiple sites with positive rheumatoid factor  -     diclofenac sodium (VOLTAREN) 1 % Gel; Apply 2 g topically 4 (four) times daily. for 10 days    Neuropathic pain of left foot        -pt seen, evaluated, and managed  -dx discussed in detail. All questions/concerns addressed  -all tx options discussed. All alternatives, risks, benefits of all txs discussed  -the patient was educated about the diagnosis  -clinically a R 3rd toe pressure ulceration from RA deformity of toe has healed  -vgel for RA pain    -rxs dispensed: vgel  -referrals: none  -WB: wbat        Follow up if symptoms worsen or fail to improve.    Subjective:      Patient ID: Joyce Peterson is a 64 y.o. female.    Chief Complaint:   Chief Complaint   Patient presents with    Toe Pain     2nd and third toe pain       CC - foot ulcer: Patient presents to the clinic for evaluation and treatment of high risk feet. The patient's chief complaint is foot ulcer, R foot. duration: several wks. denies n/v/f/c.    Of note - she is s/p left triple arthrodesis, midfoot arthrodesis, ARLEN, CBG on 1/11/23 w/ dr. Finnegan. Overall she is very happy with result of L foot reconstruction.      1/31/25  Hx as above.  F/u for ulceration R foot. Denies n/v/f/c. Reports ulcer healed but toe hurts.    Foot Ulcer    Toe Pain       Last Podiatry Enc: Visit date not found  Last Enc w/ Me: Visit date not found    Outside reports reviewed: historical medical records.  Family hx: as below  Past Medical History:   Diagnosis Date    Acid reflux     Allergy     Anemia     Asthma     Coronary artery disease     CS (cervical spondylosis) 03/08/2013    Degenerative disc disease     Degenerative joint disease of hindfoot, left 6/28/2022    Dry eyes     Dry mouth     Gastroparesis     History of methotrexate therapy 01/19/2022    Hyperlipidemia     Lateral meniscus derangement 04/06/2016    Lobular carcinoma in situ     Lumbar spondylosis 03/08/2013    Osteoarthritis     Osteoporosis     Pes planovalgus, acquired, left 6/28/2022    Rheumatoid arthritis(714.0)     Rupture of left triceps tendon 10/17/2018    Umbilical hernia 08/13/2015     Past Surgical History:   Procedure Laterality Date    BACK SURGERY  06/12/2024    revision of prior back surgery on 07/14/2023    BREAST BIOPSY Left 01/29/2002    core bx    CARPAL TUNNEL RELEASE Right 05/2017    CHOLECYSTECTOMY  2004    COLONOSCOPY      10/11    COLONOSCOPY N/A 06/29/2017    Procedure: COLONOSCOPY;  Surgeon: López Moore MD;  [facility]  Service: Endoscopy;  Laterality: N/A;    EPIDURAL STEROID INJECTION N/A 11/18/2021    Procedure: INJECTION, STEROID, EPIDURAL, L5-S1 IL NEED CONSENT;  Surgeon: Tj Mayfield MD;  Location: [facility] PAIN MGT;  Service: Pain Management;  Laterality: N/A;    EPIDURAL STEROID INJECTION N/A 09/29/2022    Procedure: LUMBAR L3/L4 IL MADELINE CONTRAST;  Surgeon: Tj Mayfield MD;  Location: [facility] PAIN MGT;  Service: Pain Management;  Laterality: N/A;    EPIDURAL STEROID INJECTION N/A 12/12/2022    Procedure: INJECTION, STEROID, EPIDURAL L5/S1 IL CONTRAST;  Surgeon: Tj Mayfield MD;  Location: [facility] PAIN MGT;  Service: Pain  Management;  Laterality: N/A;    EPIDURAL STEROID INJECTION N/A 04/06/2023    Procedure: INJECTION, STEROID, EPIDURAL L5/S1;  Surgeon: Tj Mayfield MD;  Location: [facility] [place] PAIN MGT;  Service: Pain Management;  Laterality: N/A;    FOOT ARTHRODESIS Left 01/11/2023    Procedure: FUSION, FOOT;  Surgeon: rAiella Finnegan MD;  Location: [facility] OR;  Service: Orthopedics;  Laterality: Left;  nimbus    FUSION, SPINE, LUMBAR, TLIF, POSTERIOR APPROACH, USING PEDICLE SCREW N/A 06/22/2023    Procedure: FUSION, SPINE, LUMBAR, TLIF, POSTERIOR APPROACH, USING PEDICLE SCREW L4/5 spinewave SNS:SSEP/EMG;  Surgeon: Jh Mosqueda MD;  Location: [facility] OR [address] [place] [address];  Service: Neurosurgery;  Laterality: N/A;    HARVESTING OF BONE GRAFT Left 01/11/2023    Procedure: SURGICAL PROCUREMENT, BONE GRAFT;  Surgeon: Ariella Finnegan MD;  Location: [facility] OR;  Service: Orthopedics;  Laterality: Left;    INJECTION OF ANESTHETIC AGENT AROUND NERVE Bilateral 08/23/2021    Procedure: BLOCK, NERVE, MEDIAL BRANCH L3,L4,L5;  Surgeon: Tj Mayfield MD;  Location: [facility] [place] PAIN MGT;  Service: Pain Management;  Laterality: Bilateral;  1 of 2    INJECTION OF ANESTHETIC AGENT AROUND NERVE Bilateral 08/26/2021    Procedure: BLOCK, NERVE, MEDIAL BRANCH L3,L4,L5;  Surgeon: Tj Mayfield MD;  Location: [facility] PAIN MGT;  Service: Pain Management;  Laterality: Bilateral;  2 of 2    INJECTION OF ANESTHETIC AGENT AROUND NERVE Left 07/07/2022    Procedure: BLOCK, NERVE, LEFT OBTURATOR AND FEMORAL;  Surgeon: Tj Mayfield MD;  Location: [facility] [place] PAIN MGT;  Service: Pain Management;  Laterality: Left;    INJECTION OF FACET JOINT Bilateral 03/12/2020    Procedure: FACET JOINT INJECTION (LUMBAR BLOCK) BILATERAL L4-5 AND L5-S1 DIRECT REFERRAL;  Surgeon: Tj Mayfield MD;  Location: [facility] [place] PAIN MGT;  Service: Pain Management;  Laterality: Bilateral;  NEEDS CONSENT    INJECTION OF FACET JOINT Bilateral 03/29/2021    Procedure: INJECTION, FACET JOINT L3/4, L4/5, L5/S1;  Surgeon: Tj Mayfield  MD;  Location: [facility];  Service: Pain Management;  Laterality: Bilateral;    INJECTION OF JOINT Right 07/30/2020    Procedure: INJECTION, JOINT, RIGHT HIP Interarticular under flouro;  Surgeon: Tj Mayfield MD;  [facility]: [facility];  Service: Pain Management;  Laterality: Right;  INJECTION, JOINT, RIGHT HIP Interarticular under flouro    INJECTION OF JOINT Right 02/21/2022    Procedure: Injection, Joint RIGHT ISCHIAL BURSA;  Surgeon: Tj Mayfield MD;  Location: [facility];  Service: Pain Management;  Laterality: Right;    INTRAMEDULLARY RODDING OF FEMUR Left 05/21/2019    Procedure: INSERTION, INTRAMEDULLARY ARIEL, FEMUR;  Surgeon: López Duff MD;  Location: [address];  Service: Orthopedics;  Laterality: Left;    LENGTHENING OF TENDON OF LOWER EXTREMITY Left 01/11/2023    Procedure: LENGTHENING, TENDON, LOWER EXTREMITY;  Surgeon: Ariella Finnegan MD;  Location: [facility];  Service: Orthopedics;  Laterality: Left;    MAGNETIC RESONANCE IMAGING N/A 05/29/2023    Procedure: MRI (Magnetic Resonance Imagine);  Surgeon: Sujey Surgeon;  Location: [facility];  Service: Anesthesiology;  Laterality: N/A;    MAGNETIC RESONANCE IMAGING N/A 01/10/2024    Procedure: MRI (Magnetic Resonance Imagine);  Surgeon: Sujey Robin;  Location: [facility];  Service: Anesthesiology;  Laterality: N/A;    RADIOFREQUENCY ABLATION Left 09/20/2021    Procedure: RADIOFREQUENCY ABLATION left L3,4,5 RFA  1 of 2  CONSENT NEEDED;  Surgeon: Tj Mayfield MD;  Location: [facility];  Service: Pain Management;  Laterality: Left;    RADIOFREQUENCY ABLATION Right 10/04/2021    Procedure: RADIOFREQUENCY ABLATION  right L3,4,5 RFA   2 of 2  CONSENT NEEDED;  Surgeon: Tj Mayfield MD;  [facility]: [facility];  Service: Pain Management;  Laterality: Right;    RADIOFREQUENCY ABLATION Left 04/21/2022    Procedure: Radiofrequency Ablation LEFT L3,L4,L5;  Surgeon: Tj Mayfield MD;  Location: [facility];  Service: Pain Management;   Laterality: Left;    RADIOFREQUENCY ABLATION Right 05/12/2022    Procedure: Radiofrequency Ablation RIGHT L3,L4,L5;  Surgeon: Tj Mayfield MD;  Location: [facility] PAIN MGT;  Service: Pain Management;  Laterality: Right;    RADIOFREQUENCY ABLATION Left 07/25/2022    Procedure: Radiofrequency Ablation Left Femoral & Obturator;  Surgeon: Tj Mayfield MD;  Location: [facility] PAIN MGT;  Service: Pain Management;  Laterality: Left;    REPAIR OF TRICEPS TENDON Left 10/17/2018    Procedure: REPAIR, TENDON, TRICEPS left elbow;  Surgeon: Staci Yarbrough MD;  Location: [facility] OR 1ST FLR;  Service: Orthopedics;  Laterality: Left;  Anesthesia: General and regional. PRONE, k-wire , hand pan 1 and pan 2, CALL ARTHREX/Tamera notified 10-12 LO    TONSILLECTOMY      TRANSFORAMINAL EPIDURAL INJECTION OF STEROID Right 08/31/2020    Procedure: INJECTION, STEROID, EPIDURAL, TRANSFORAMINAL,  APPROACH, L3-L4 and L4-L5 need consent;  Surgeon: Tj Mayfield MD;  Location: [facility] PAIN MGT;  Service: Pain Management;  Laterality: Right;    TRANSFORAMINAL EPIDURAL INJECTION OF STEROID Bilateral 07/23/2021    Procedure: INJECTION, STEROID, EPIDURAL, TRANSFORAMINAL APPROACH L4/5;  Surgeon: Tj Mayfield MD;  Location: [facility] PAIN MGT;  Service: Pain Management;  Laterality: Bilateral;    TRANSFORAMINAL EPIDURAL INJECTION OF STEROID Bilateral 09/25/2023    Procedure: LUMBAR TRANSFORAMINAL BILATERAL  L2/3 DIRECT REFERRAL;  Surgeon: Tj Mayfield MD;  Location: [facility] PAIN MGT;  Service: Pain Management;  Laterality: Bilateral;    TRANSFORAMINAL EPIDURAL INJECTION OF STEROID Left 10/18/2023    Procedure: LUMBAR TRANSFORAMINAL LEFT L3/4 AND L4/5 * CLEARANCE IN CHART*;  Surgeon: Tj Mayfield MD;  Location: [facility] PAIN MGT;  Service: Pain Management;  Laterality: Left;    TRIGGER POINT INJECTION N/A 07/23/2021    Procedure: INJECTION, TRIGGER POINT SCAPULAR;  Surgeon: Tj Mayfield MD;  Location: [facility] PAIN MGT;  Service: Pain Management;   "Laterality: N/A;    TUBAL LIGATION  2003    UPPER GASTROINTESTINAL ENDOSCOPY      10/11    uterine ablation  2003     Family History   Problem Relation Name Age of Onset    Cancer Mother 69         Lung Cancer    Emphysema Mother 69     Heart attack Mother 69     Alcohol abuse Mother 69     Cancer Father          Lung Cancer    Osteoarthritis Father      Lung cancer Father      Skin cancer Father      Alcohol abuse Father      Heart disease Brother 57     Heart attack Brother 57     Alcohol abuse Brother 57     Cirrhosis Brother 57     Osteoarthritis Paternal Aunt      Retinal detachment Maternal Aunt      No Known Problems Sister      No Known Problems Maternal Uncle      No Known Problems Paternal Uncle      No Known Problems Maternal Grandmother      No Known Problems Maternal Grandfather      No Known Problems Paternal Grandmother      No Known Problems Paternal Grandfather      Colon cancer Neg Hx      Esophageal cancer Neg Hx      Stomach cancer Neg Hx      Celiac disease Neg Hx      Diabetes Neg Hx      Thyroid disease Neg Hx      Amblyopia Neg Hx      Blindness Neg Hx      Cataracts Neg Hx      Glaucoma Neg Hx      Hypertension Neg Hx      Macular degeneration Neg Hx      Strabismus Neg Hx      Stroke Neg Hx       Current Outpatient Medications   Medication Sig Dispense Refill    albuterol (PROVENTIL/VENTOLIN HFA) 90 mcg/actuation inhaler Inhale 2 puffs into the lungs every 6 (six) hours as needed for Wheezing. Rescue 20.1 g 11    albuterol-ipratropium (DUO-NEB) 2.5 mg-0.5 mg/3 mL nebulizer solution Take 3 mLs by nebulization every 6 (six) hours as needed for Wheezing. Rescue 90 mL 3    BD ULTRA-FINE JAIME PEN NEEDLE 32 gauge x 5/32" Ndle For Forteo - inject daily 100 each 3    benzonatate (TESSALON PERLES) 100 MG capsule Take 2 capsules (200 mg total) by mouth 3 (three) times daily as needed for Cough. 30 capsule 1    budesonide-formoterol 160-4.5 mcg (SYMBICORT) " 160-4.5 mcg/actuation HFAA Inhale 2 puffs into the lungs every 12 (twelve) hours. 30.6 g 3    calcium citrate-vitamin D3 315-200 mg (CITRACAL+D) 315 mg-5 mcg (200 unit) per tablet Take 1 tablet by mouth 2 (two) times daily.       cefdinir (OMNICEF) 300 MG capsule Take 1 capsule by mouth every 12 (twelve) hours 30 capsule 0    cycloSPORINE (RESTASIS) 0.05 % ophthalmic emulsion Place 1 drop into both eyes 2 (two) times daily. 180 each 3    diazePAM (VALIUM) 10 MG Tab Take 1 tablet (10 mg total) by mouth every evening. 2 tablet 0    diclofenac sodium (VOLTAREN) 1 % Gel APPLY 2 GRAMS TOPICALLY 4 (FOUR) TIMES DAILY AS NEEDED. 300 g 2    diclofenac sodium (VOLTAREN) 1 % Gel Apply 2 g topically 4 (four) times daily. for 10 days 100 g 2    fexofenadine (ALLEGRA) 180 MG tablet Take 1 tablet (180 mg total) by mouth once daily. 90 tablet 3    fluconazole (DIFLUCAN) 150 MG Tab Take 150 mg by mouth as needed.      fluticasone propionate (FLONASE) 50 mcg/actuation nasal spray Use 1 spray (50 mcg total) in each nostril once daily. 16 g 1    HYDROmorphone (DILAUDID) 4 MG tablet Take 0.5 tablets (2 mg total) by mouth every 12 (twelve) hours as needed for Pain. 60 tablet 0    INV PROPULSID 10 MG Take 2 tablets (20 mg) by mouth 4 (four) times daily. FOR INVESTIGATIONAL USE ONLY. Protocol [id number]-154 Subject ID: [id number]. 1440 each 0    leflunomide (ARAVA) 20 MG Tab Take 1 tablet (20 mg total) by mouth once daily. 90 tablet 0    LIDOcaine (LIDODERM) 5 % Place 1 patch onto the skin once daily. Remove & Discard patch within 12 hours or as directed by MD 30 patch 1    lifitegrast (XIIDRA) 5 % Dpet INSTILL ONE DROP INTO BOTH EYES TWICE A DAY 60 mL 10    magic mouthwash diphen/antac/lidoc rinse mouth with 5 ml for 30 seconds and spit out, twice daily 150 mL 2    methenamine (HIPREX) 1 gram Tab Take 1 tablet (1 g total) by mouth 2 (two) times daily. 60 tablet 11    mirabegron (MYRBETRIQ) 25 mg Tb24 ER tablet Take 1 tablet (25  mg total) by mouth once daily. 30 tablet 11    montelukast (SINGULAIR) 10 mg tablet Take 1 tablet (10 mg total) by mouth every evening. 90 tablet 3    morphine (MS CONTIN) 15 MG 12 hr tablet Take 1 tablet (15 mg total) by mouth 2 (two) times daily. 60 tablet 0    naloxone (NARCAN) 4 mg/actuation Spry 4mg by nasal route as needed for opioid overdose; may repeat every 2-3 minutes in alternating nostrils until medical help arrives. Call 911 2 each 11    naproxen (NAPROSYN) 500 MG tablet TAKE 1 TABLET BY MOUTH TWICE A DAY AS NEEDED 180 tablet 0    omeprazole-sodium bicarbonate (ZEGERID) 40-1.1 mg-gram per capsule TAKE 1 CAPSULE BY MOUTH EVERY DAY IN THE MORNING 90 capsule 3    oxyCODONE (ROXICODONE) 10 mg Tab immediate release tablet Take 1 tablet by mouth every 4 (four) hours as needed (acute post op pain) for up to 7 days Max Daily Amount: 60 mg 42 tablet 0    pantoprazole (PROTONIX) 40 MG tablet Take 1 tablet (40 mg total) by mouth 2 (two) times daily. 180 tablet 3    potassium chloride SA (K-DUR,KLOR-CON) 20 MEQ tablet Take 1 tablet (20 mEq total) by mouth once daily. 90 tablet 3    predniSONE (DELTASONE) 1 MG tablet Take 1 tablet (1 mg total) by mouth once daily. 90 tablet 1    predniSONE (DELTASONE) 5 MG tablet Take 1 tablet (5 mg total) by mouth once daily. 90 tablet 1    pregabalin (LYRICA) 150 MG capsule Take 1 capsule (150 mg total) by mouth 3 (three) times daily. 90 capsule 2    promethazine (PHENERGAN) 25 MG tablet Take 1 tablet (25 mg total) by mouth every 6 (six) hours as needed for Nausea. 90 tablet 5    promethazine-dextromethorphan (PROMETHAZINE-DM) 6.25-15 mg/5 mL Syrp Take 10 mLs by mouth nightly as needed for cough 120 mL 0    rosuvastatin (CRESTOR) 20 MG tablet TAKE 1 TABLET BY MOUTH EVERY DAY IN THE EVENING 90 tablet 3    sarilumab (KEVZARA) 200 mg/1.14 mL Syrg Inject 1.14 mL (200 mg total) into the skin every 14 (fourteen) days. 2.28 mL 2    senna-docusate 8.6-50 mg (PERICOLACE)  "8.6-50 mg per tablet Take 1 tablet by mouth daily 30 tablet 1    STIMULANT LAXATIVE PLUS 8.6-50 mg per tablet Take 1 tablet by mouth.      sucralfate (CARAFATE) 1 gram tablet TAKE 1 TABLET BY MOUTH FOUR TIMES A  tablet 2    teriparatide (FORTEO) 20 mcg/dose (600mcg/2.4mL) PnIj Inject 0.08 mLs (20 mcg total) into the skin once daily. 2.4 mL 11    tiZANidine (ZANAFLEX) 4 MG tablet Take 1 tablet (4 mg total) by mouth every 6 (six) hours as needed (for muscle spasms). 120 tablet 1    traMADoL (ULTRAM) 50 mg tablet Take 50 mg by mouth every 6 (six) hours as needed.      triamcinolone (NASACORT) 55 mcg nasal inhaler 2 sprays by Nasal route once daily. 17 g 5     No current facility-administered medications for this visit.     Review of patient's allergies indicates:   Allergen Reactions    Actemra [tocilizumab] Other (See Comments)     Severe dizziness    Codeine Nausea And Vomiting and Hives    Gold au 198 Hives and Rash    Hydroxychloroquine Other (See Comments)     Can't remember the reaction      Iodinated contrast media Other (See Comments)     Other reaction(s): BURNING ALL OVER    Iodine Other (See Comments) and Hives     Other reaction(s): BURNING ALL OVER - Iodine dye - Not topical    Ondansetron Nausea And Vomiting    Sulfa (sulfonamide antibiotics) Other (See Comments)     Can't remember the reaction    Zofran [ondansetron hcl (pf)] Nausea And Vomiting     Pt reports that last time she received zofran she started vomiting again    Methotrexate analogues Nausea Only    Pneumococcal vaccine Other (See Comments)    Pneumovax 23 [pneumococcal 23-argenis ps vaccine] Other (See Comments)     "sick"    Methotrexate Nausea Only     Social History     Socioeconomic History    Marital status:    Tobacco Use    Smoking status: Never    Smokeless tobacco: Never   Substance and Sexual Activity    Alcohol use: Yes     Comment: 2-3 times per year.    Drug use: No    Sexual activity: Yes     " Partners: Male     Birth control/protection: Surgical     Social Drivers of Health     Financial Resource Strain: Low Risk  (6/12/2024)    Received from [facility]    Overall Financial Resource Strain (CARDIA)     Difficulty of Paying Living Expenses: Not hard at all   Food Insecurity: No Food Insecurity (6/12/2024)    Received from [facility]    Hunger Vital Sign     Worried About Running Out of Food in the Last Year: Never true     Ran Out of Food in the Last Year: Never true   Transportation Needs: No Transportation Needs (6/12/2024)    Received from [facility]    PRAPARE - Transportation     Lack of Transportation (Medical): No     Lack of Transportation (Non-Medical): No   Physical Activity: Patient Unable To Answer (6/12/2024)    Received from [facility]    Exercise Vital Sign     Days of Exercise per Week: Patient unable to answer     Minutes of Exercise per Session: Patient unable to answer   Stress: No Stress Concern Present (6/12/2024)    Received from [facility]    [place] [place] [employer] - Occupational Stress Questionnaire     Feeling of Stress : Not at all   Housing Stability: Unknown (12/5/2023)    Housing Stability Vital Sign     Unable to Pay for Housing in the Last Year: Patient refused     Number of Places Lived in the Last Year: 1     Unstable Housing in the Last Year: No       ROS    REVIEW OF SYSTEMS: Negative as documented below as well as positive findings in bold.       Constitutional  Respiratory  Gastrointestinal  Skin   - Fever - Cough - Heartburn - Rash   - Chills - Spit blood - Nausea - Itching   - Weight Loss - Shortness of breath - Vomiting - Nail pain   - Malaise/Fatigue - Wheezing - Abdominal Pain  Wound/Ulcer   - Weight Gain   - Blood in Stool  Poor wound healing       - Diarrhea          Cardiovascular  Genitourinary  Neurological  HEENT   - Chest Pain - Dysuria - Burning Sensation of feet - Headache   - Palpitations - Hematuria - Tingling /  "Paresthesia - Congestion   - Pain at night in legs - Flank Pain - Dizziness - Sore Throat   - Cramping   - Tremor - Blurred Vision   - Leg Swelling   - Sensory Change - Double Vision   - Dizzy when standing   - Speech Change - Eye Redness       - Focal Weakness - Dry Eyes       - Loss of Consciousness          Endocrine  Musculoskeletal  Psychiatric   - Cold intolerance - Muscle Pain - Depression   - Heat intolerance - Neck Pain - Insomnia   - Anemia - Joint Pain - Memory Loss   -  Easy bruising, bleeding - Heel pain - Anxiety      Toe Pain        Leg/Ankle/Foot Pain         Objective:     Ht 5' 1" (1.549 m)   Wt 65 kg (143 lb 4.8 oz)   LMP  (LMP Unknown)   BMI 27.08 kg/m²   Vitals:    01/31/25 1106   Weight: 65 kg (143 lb 4.8 oz)   Height: 5' 1" (1.549 m)   PainSc:   5             Physical Exam    General Appearance:   Patient appears well developed, well nourished  Patient appears stated age    Psychiatric:   Patient is oriented to time, place, and person.  Patient has appropriate mood and affect    Neck:  Trachea Midline  No visible masses    Respiratory/Ears:  No distress or labored breathing.  Able to differentiate between normal talking voice and whisper.  Able to follow commands    Eyes:  Visual Acuity intact  Lids and conjunctivae normal. No discoloration noted.    Physical Exam  Ortho Exam  Ortho/SPM Exam  Foot Exam  Physical Exam  Neurologic Exam    R LE exam con't:  V:  DP 1/4, PT 2/4   CRT< 3s to all digits tested   Tibial and popliteal lymph nodes are w/o abnormality    No edema, + VV    N:  Patient displays normal ankle reflexes   SILT in SP/DP/T/Opal/Saph distributions    Ortho: +Motor EHL/FHL/TA/GA   +TTP R 3rd toe   Compartments soft/compressible. No pain on passive stretch of big toe. No calf  Pain.   hammertoe deformity present 3rd toe    Derm:  skin intact and ulcer healed    Ulcer Location: 3rd toe medial  Measurements (post-debridement): healed    Imaging / Labs:    Hemoglobin A1C   Date " Value Ref Range Status   03/15/2022 5.2 4.0 - 5.6 % Final     Comment:     ADA Screening Guidelines:  5.7-6.4%  Consistent with prediabetes  >or=6.5%  Consistent with diabetes    High levels of fetal hemoglobin interfere with the HbA1C  assay. Heterozygous hemoglobin variants (HbS, HgC, etc)do  not significantly interfere with this assay.   However, presence of multiple variants may affect accuracy.     02/06/2017 6.0 4.5 - 6.2 % Final     Comment:     According to ADA guidelines, hemoglobin A1C <7.0% represents  optimal control in non-pregnant diabetic patients.  Different  metrics may apply to specific populations.   Standards of Medical Care in Diabetes - 2016.  For the purpose of screening for the presence of diabetes:  <5.7%     Consistent with the absence of diabetes  5.7-6.4%  Consistent with increasing risk for diabetes   (prediabetes)  >or=6.5%  Consistent with diabetes  Currently no consensus exists for use of hemoglobin A1C  for diagnosis of diabetes for children.     09/28/2015 5.6 4.5 - 6.2 % Final           Note: This was dictated using a computer transcription program. Although proofread, it may contain computer transcription errors and phonetic errors. Other human proofreading errors may also exist. Corrections may be performed at a later time. Please contact us for any clarification if needed.    Isiah Mark DPM  [facility]

## 2025-02-03 ENCOUNTER — PATIENT MESSAGE (OUTPATIENT)
Dept: UROLOGY | Facility: CLINIC | Age: 65
End: 2025-02-03
Payer: MEDICARE

## 2025-02-03 DIAGNOSIS — N30.00 ACUTE CYSTITIS WITHOUT HEMATURIA: Primary | ICD-10-CM

## 2025-02-05 ENCOUNTER — TELEPHONE (OUTPATIENT)
Dept: UROLOGY | Facility: CLINIC | Age: 65
End: 2025-02-05
Payer: MEDICARE

## 2025-02-05 NOTE — TELEPHONE ENCOUNTER
----- Message from Anastasia Healy DNP sent at 2/5/2025  1:21 PM CST -----  Please inform patient via telephone that her urine cx was normal. She does not have a UTI.

## 2025-02-10 DIAGNOSIS — M79.7 FIBROMYALGIA: ICD-10-CM

## 2025-02-11 ENCOUNTER — PATIENT MESSAGE (OUTPATIENT)
Dept: PAIN MEDICINE | Facility: CLINIC | Age: 65
End: 2025-02-11
Payer: MEDICARE

## 2025-02-11 DIAGNOSIS — G89.4 CHRONIC PAIN DISORDER: ICD-10-CM

## 2025-02-11 RX ORDER — PREGABALIN 150 MG/1
150 CAPSULE ORAL 3 TIMES DAILY
Qty: 90 CAPSULE | Refills: 2 | OUTPATIENT
Start: 2025-02-11

## 2025-02-12 RX ORDER — MORPHINE SULFATE 15 MG/1
15 TABLET, FILM COATED, EXTENDED RELEASE ORAL 2 TIMES DAILY
Qty: 60 TABLET | Refills: 0 | Status: SHIPPED | OUTPATIENT
Start: 2025-02-12

## 2025-02-13 ENCOUNTER — OFFICE VISIT (OUTPATIENT)
Dept: OTOLARYNGOLOGY | Facility: CLINIC | Age: 65
End: 2025-02-13
Payer: MEDICARE

## 2025-02-13 VITALS
BODY MASS INDEX: 27.26 KG/M2 | SYSTOLIC BLOOD PRESSURE: 128 MMHG | DIASTOLIC BLOOD PRESSURE: 74 MMHG | WEIGHT: 144.31 LBS | HEART RATE: 88 BPM

## 2025-02-13 DIAGNOSIS — H61.91 LESION OF EXTERNAL EAR CANAL, RIGHT: Chronic | ICD-10-CM

## 2025-02-13 DIAGNOSIS — H60.63 CHRONIC OTITIS EXTERNA OF BOTH EARS, UNSPECIFIED TYPE: Chronic | ICD-10-CM

## 2025-02-13 DIAGNOSIS — H92.11 OTORRHEA OF RIGHT EAR: Primary | Chronic | ICD-10-CM

## 2025-02-13 DIAGNOSIS — J31.0 CHRONIC RHINITIS: Chronic | ICD-10-CM

## 2025-02-13 PROCEDURE — 92504 EAR MICROSCOPY EXAMINATION: CPT | Mod: S$GLB,,, | Performed by: OTOLARYNGOLOGY

## 2025-02-13 PROCEDURE — 1160F RVW MEDS BY RX/DR IN RCRD: CPT | Mod: CPTII,S$GLB,, | Performed by: OTOLARYNGOLOGY

## 2025-02-13 PROCEDURE — 1159F MED LIST DOCD IN RCRD: CPT | Mod: CPTII,S$GLB,, | Performed by: OTOLARYNGOLOGY

## 2025-02-13 PROCEDURE — 99999 PR PBB SHADOW E&M-EST. PATIENT-LVL IV: CPT | Mod: PBBFAC,,, | Performed by: OTOLARYNGOLOGY

## 2025-02-13 PROCEDURE — 99214 OFFICE O/P EST MOD 30 MIN: CPT | Mod: 25,S$GLB,, | Performed by: OTOLARYNGOLOGY

## 2025-02-13 PROCEDURE — 3074F SYST BP LT 130 MM HG: CPT | Mod: CPTII,S$GLB,, | Performed by: OTOLARYNGOLOGY

## 2025-02-13 PROCEDURE — 3008F BODY MASS INDEX DOCD: CPT | Mod: CPTII,S$GLB,, | Performed by: OTOLARYNGOLOGY

## 2025-02-13 PROCEDURE — 3078F DIAST BP <80 MM HG: CPT | Mod: CPTII,S$GLB,, | Performed by: OTOLARYNGOLOGY

## 2025-02-13 RX ORDER — TRIAMCINOLONE ACETONIDE 55 UG/1
2 SPRAY, METERED NASAL DAILY
Qty: 16.9 ML | Refills: 5 | Status: SHIPPED | OUTPATIENT
Start: 2025-02-13

## 2025-02-13 NOTE — PROGRESS NOTES
Chief Complaint   Patient presents with    Follow-up     Recheck right ear       HPI:  Patient is a 64 y.o. female who has previously seen me for chronic rhinitis, chronic otitis externa, right ear canal lesion, previous sialoadenitis.  At last visit she was treated with Ciprodex otic drops for right ear canal inflammation, and mupirocin for left nasal vestibule inflammation and small lesion.  She presents today for follow-up to re-examine these areas and assess response to therapy.    Since the last visit, the patient reports the nasal crusting and irritation has improved though does occasionally flare up.  She used Ciprodex drops on the right ear for short time, but they were causing pain with use so she stopped.  She has not noticed any otorrhea or other ongoing issues with the right ear, though she occasionally has mild discomfort.  She does complain of purulent nasal secretions and thick postnasal drip over the last few weeks.  She has some sinus pressure and ear pressure as well.  She does report a history of allergic rhinitis symptoms in the past and has had some occasional sinusitis when allergies flare.      Interval HPI 11/26/2024 :      Patient reports she has not had any significant otorrhea from the right.  She still continues to use Q-tips in the ears and does notice some scant blood in the ear when she does this.  Previous cultures from last visit did not grow any bacteria or fungus.    She also reports mild increase in her rhinitis symptoms over the last few days.  She uses flonase and azelastine occasionally, and she uses zyrtec, but antihistamines cause significant drying of her eyes.    Interval HPI 02/13/2025 :      Patient was using [employer] eardrops and felt the issue was improving so she had stopped use recently.  She does not report any changes or worsening of the right ear symptoms.  She has had an increase in her allergy/rhinitis symptoms over the last day.        Component 2 mo  ago   Aerobic Bacterial Culture  Abnormal   STAPHYLOCOCCUS AUREUS  Many    Resulting Agency [facility]        Susceptibility     Staphylococcus aureus     CULTURE, AEROBIC  (SPECIFY SOURCE)     Clindamycin <=0.5 mcg/mL Resistant     Erythromycin >4 mcg/mL Resistant     Oxacillin <=0.25 mcg/mL Sensitive     Tetracycline <=4 mcg/mL Sensitive     Trimeth/Sulfa <=0.5/9.5 m... 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    Thyroid nodule 08/22/2013    Hyperlipidemia 08/22/2013    Mild intermittent asthma without complication 11/04/2015    Rheumatoid arthritis involving multiple sites 11/16/2016    Iron deficiency anemia due to chronic blood loss 11/14/2017    Sjogren's syndrome 05/21/2018    Candidal esophagitis 10/15/2018    Coronary artery disease involving native coronary artery of native heart without angina pectoris 07/16/2019    Subclinical hyperthyroidism 02/21/2020    Dry eyes     Ureterocele     Spinal stenosis, lumbar region without neurogenic claudication 07/23/2021    History of methotrexate therapy 01/19/2022    NAFLD (nonalcoholic fatty liver disease) 12/29/2022    Chronic pain 12/29/2022    Hypokalemia 06/24/2023    Arthrodesis status 06/29/2023    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 01/11/2024    Nausea 04/15/2024    Acute deep vein thrombosis (DVT) of right peroneal vein 07/22/2024     Resolved Ambulatory Problems     Diagnosis Date Noted    Osteoarthritis involving multiple joints on both sides of body 08/01/2012    Coronary artery disease 02/06/2013    Posterior tibial tendinitis of left leg 03/08/2013    Lumbar spondylosis 03/08/2013    CS (cervical spondylosis) 03/08/2013    Osteoarthritis of both knees 03/08/2013    Occipital neuralgia 08/13/2014    Osteoarthritis of CMC joint of thumb 11/13/2014    URTI (acute upper respiratory infection)  07/28/2015    Umbilical hernia 08/13/2015    CMC (carpometacarpal) synovitis 10/19/2015    AR (allergic rhinitis) 11/04/2015    Iron deficiency anemia 11/11/2015    Vomiting 11/23/2015    Viral gastroenteritis 11/23/2015    Sepsis 11/23/2015    SIRS due to infectious process with organ dysfunction 11/23/2015    Intractable vomiting with nausea 11/23/2015    Volume depletion, gastrointestinal loss 11/23/2015    Immunosuppressed status 03/03/2016    Lateral meniscus derangement 04/06/2016    Lateral epicondylitis of right elbow 08/10/2016    Pleurisy 11/01/2016    Thoracic back pain 02/07/2017    Low back pain 02/07/2017    Carpal tunnel syndrome of right wrist 04/04/2017    Elevated parathyroid hormone 05/10/2017    Low TSH level 05/10/2017    Hypogammaglobulinemia 05/10/2017    Right carpal tunnel syndrome 05/29/2017    Iron deficiency anemia due to chronic blood loss 06/29/2017    Pain in right hand 06/30/2017    Pain in right elbow 06/30/2017    Range of motion deficit 06/30/2017    Decreased  strength of right hand 06/30/2017    Right shoulder pain 08/21/2017    Pure hypercholesterolemia 01/08/2018    Research study patient 04/30/2018    Left elbow pain 06/20/2018    Rupture of left triceps tendon 10/17/2018    Pain in left elbow 11/01/2018    Stiffness of left elbow joint 01/10/2019    Weakness of left arm 01/10/2019    Left hip pain 03/06/2019    Acute hip pain, left 03/20/2019    Gait abnormality 03/20/2019    Greater trochanteric bursitis of left hip 05/13/2019    Stress fracture of neck of left femur 05/20/2019    Left leg weakness 06/06/2019    Impaired functional mobility, balance, gait, and endurance 06/13/2019    Buttock pain 09/27/2019    Difficulty walking 12/05/2019    ROBERT (iron deficiency anemia) 01/24/2020    Fibromyalgia 03/12/2020    Recurrent UTI     Atrophic vaginitis     Drug-induced constipation     Dry mouth     Nocturia     Urinary frequency     Urge urinary incontinence     RADHA (stress  urinary incontinence, female)     Elevated serum GGT level 03/17/2021    History of long term use of Methotrexate  03/17/2021    Transaminasemia 03/17/2021    Chronic pain 03/29/2021    Acute pain of left shoulder 04/26/2021    Osteoarthritis of lumbar spine 08/23/2021    Decreased range of motion with decreased strength 10/05/2021    Impaired mobility and activities of daily living 10/05/2021    Decreased strength of lower extremity 10/08/2021    Healthcare maintenance 01/18/2022    Abnormal finding of blood chemistry, unspecified  01/19/2022    Stress fracture of left foot 06/28/2022    Primary osteoarthritis, left ankle and foot 06/28/2022    Pes planovalgus, acquired, left 06/28/2022    Degenerative joint disease of hindfoot, left 06/28/2022    Decreased strength, endurance, and mobility 07/05/2022    Impaired tolerance of activity 07/05/2022    Current chronic use of systemic steroids 12/29/2022    Hormone replacement therapy (HRT) 12/29/2022    Edema 12/29/2022    Leukopenia 12/29/2022    History of COVID-19 12/29/2022    Neuropathic pain of left foot 02/03/2023    Impaired gait and mobility 03/01/2023    Lumbar stenosis 06/23/2023    Lower extremity edema 06/23/2023    Cutaneous abscess of right knee 06/27/2023    Long term (current) use of inhaled steroids 06/29/2023    History of falling 06/29/2023    Encounter for other orthopedic aftercare 06/29/2023    Normocytic anemia 06/29/2023    Age-related osteoporosis without current pathological fracture 06/29/2023    Long-term use of high-risk medication 11/07/2023    S/P lumbar fusion 07/31/2024     Past Medical History:   Diagnosis Date    Acid reflux     Allergy     Anemia     Asthma     Degenerative disc disease     Lobular carcinoma in situ     Osteoarthritis     Osteoporosis     Rheumatoid arthritis(714.0)        Review of Systems  General: negative for chills, fever or weight loss  Psychological: negative for mood changes or depression  Ophthalmic:  negative for blurry vision, photophobia or eye pain  ENT: see HPI  Respiratory: no cough, shortness of breath, or wheezing  Cardiovascular: no chest pain or dyspnea on exertion  Gastrointestinal: no abdominal pain, change in bowel habits, or black/ bloody stools  Musculoskeletal: negative for gait disturbance or muscular weakness  Neurological: no syncope or seizures; no ataxia  Dermatological: negative for pruritis, rash and jaundice  Hematologic/lymphatic: no easy bruising, no new adenopathy    Physical Exam     Vitals:    02/13/25 1030   BP: 128/74   Pulse: 88         Constitutional:   She is oriented to person, place, and time. Vital signs are normal. She appears well-developed and well-nourished. She appears alert. She is cooperative. Normal speech.      Head:  Normocephalic and atraumatic. Salivary glands normal.  Facial strength is normal.      Ears:    Right Ear: There is tenderness (mild tenderness in lateral portion EAC near where crusted area located). Tympanic membrane is not perforated, not erythematous and not retracted. No middle ear effusion.   Left Ear: No drainage. Tympanic membrane is not perforated, not erythematous and not retracted.  No middle ear effusion.   Ears:      Nose:  Mucosal edema and rhinorrhea present. No septal deviation or polyps. Turbinates abnormal and turbinate hypertrophy (3+, boggy, congested mucosa).  Right sinus exhibits no maxillary sinus tenderness and no frontal sinus tenderness. Left sinus exhibits no maxillary sinus tenderness and no frontal sinus tenderness.     Mouth/Throat  Oropharynx clear and moist without lesions or asymmetry, lips, teeth, and gums normal and oropharynx normal. No mucous membrane lesions. No oropharyngeal exudate, posterior oropharyngeal edema or posterior oropharyngeal erythema. Mirror exam not performed due to patient tolerance.  Mirror exam not performed due to patient tolerance.      Neck:  Neck normal without thyromegaly masses, asymmetry,  normal tracheal structure, crepitus, and tenderness, thyroid normal, trachea normal, phonation normal, full range of motion with neck supple and no adenopathy. No JVD present. Carotid bruit is not present. No thyroid mass and no thyromegaly present.     She has no cervical adenopathy.     Cardiovascular:    Normal rate, regular rhythm and rate and rhythm, heart sounds, and pulses normal.              Pulmonary/Chest:   Effort and breath sounds normal.     Psychiatric:   She has a normal mood and affect. Her speech is normal and behavior is normal.     Neurological:   She is alert and oriented to person, place, and time. She has neurological normal, alert and oriented. No cranial nerve deficit.     Skin:   No abrasions, lacerations, lesions, or rashes.     Binocular Microscopy    Indication:   Right ear canal lesion    Additional detail was required for the otoscopic examination of the right ear.  The operating microscope was used to directly visualize the tympanic membrane and middle ear landmarks.  Findings:  Canal skin:   Small area of crusting at the lateral EAC near bony cartilaginous junction, crusting on removed with purulent drainage noted below.  Skin mildly erythematous but intact, no bony exposure; largely unchanged from previous.   TM:  intact   Ossicles:  normal   Effusion:  none     The patient tolerated the procedure well.          Assessment/Plan:      ICD-10-CM ICD-9-CM    1. Otorrhea of right ear  H92.11 388.60       2. Chronic rhinitis  J31.0 472.0 triamcinolone (NASACORT) 55 mcg nasal inhaler      3. Chronic otitis externa of both ears, unspecified type  H60.63 380.23       4. Lesion of external ear canal, right  H61.91 380.9             Restart [employer] eardrops in right ear.    Continue Nasacort nasal spray and use Benadryl daily p.r.n..  If symptoms are worsening, patient will contact me and we will discuss further treatments if needed.        Emily L. Burke, MD [facility]  Otorhinolaryngology

## 2025-02-13 NOTE — PATIENT INSTRUCTIONS
Restart topical antibiotic treatment on right ear.      The patient was given a prescription for a nasal steroid and/or nasal antihistamine nasal spray and we discussed in detail the proper mechanism of use directing the spray away from the nasal septum.  The patient was also instructed to take a daily oral antihistamine (benadryl, chlorpheniramine, or brompheniramine).    In addition, we also discussed that it will take 3-4 weeks of daily use to achieve maximal effectiveness.  The patient will please call in 3-4 weeks with their progress.     How do you use a Nasal Corticosteroid Spray?    Make sure you understand your dosing instructions. Spray only the number of prescribed sprays in each nostril. Read the package instructions before using your spray the first time.    Most corticosteroid sprays suggest the following steps:    Wash your hands well.    Gently blow your nose to clear the passageway.    Shake the container several times.    Tilt your head slightly downward.  Use the opposite hand from the nostril you will be spraying to hold the spray bottle.    Block one nostril with your finger.  Insert the nasal applicator into the other nostril.    Aim the spray toward the outer wall of the nostril.  Inhale slowly through the nose and press the .    Breathe out and repeat to apply the prescribed number of sprays.  Repeat these steps for the other nostril.     Avoid sneezing or blowing your nose right after spraying.

## 2025-02-16 DIAGNOSIS — M79.7 FIBROMYALGIA: ICD-10-CM

## 2025-02-17 ENCOUNTER — PATIENT MESSAGE (OUTPATIENT)
Dept: OPTOMETRY | Facility: CLINIC | Age: 65
End: 2025-02-17
Payer: MEDICARE

## 2025-02-17 ENCOUNTER — RESULTS FOLLOW-UP (OUTPATIENT)
Dept: RHEUMATOLOGY | Facility: CLINIC | Age: 65
End: 2025-02-17

## 2025-02-17 ENCOUNTER — PATIENT MESSAGE (OUTPATIENT)
Dept: FAMILY MEDICINE | Facility: CLINIC | Age: 65
End: 2025-02-17
Payer: MEDICARE

## 2025-02-17 DIAGNOSIS — M79.7 FIBROMYALGIA: ICD-10-CM

## 2025-02-17 RX ORDER — PREGABALIN 150 MG/1
150 CAPSULE ORAL 3 TIMES DAILY
Qty: 90 CAPSULE | Refills: 2 | OUTPATIENT
Start: 2025-02-17

## 2025-02-17 RX ORDER — PREGABALIN 150 MG/1
150 CAPSULE ORAL 3 TIMES DAILY
Qty: 270 CAPSULE | Refills: 3 | Status: SHIPPED | OUTPATIENT
Start: 2025-02-17

## 2025-02-18 ENCOUNTER — TELEPHONE (OUTPATIENT)
Dept: OPHTHALMOLOGY | Facility: CLINIC | Age: 65
End: 2025-02-18
Payer: MEDICARE

## 2025-02-24 ENCOUNTER — PATIENT MESSAGE (OUTPATIENT)
Dept: RHEUMATOLOGY | Facility: CLINIC | Age: 65
End: 2025-02-24
Payer: MEDICARE

## 2025-02-24 ENCOUNTER — PATIENT MESSAGE (OUTPATIENT)
Dept: PAIN MEDICINE | Facility: CLINIC | Age: 65
End: 2025-02-24
Payer: MEDICARE

## 2025-02-25 ENCOUNTER — TELEPHONE (OUTPATIENT)
Dept: RHEUMATOLOGY | Facility: CLINIC | Age: 65
End: 2025-02-25
Payer: MEDICARE

## 2025-02-25 NOTE — TELEPHONE ENCOUNTER
Please schedule referral to Sports Medicine, Dr. Candida MICHAEL for right hip and right shoulder pain. Thank you MEGA

## 2025-02-27 ENCOUNTER — PATIENT MESSAGE (OUTPATIENT)
Dept: FAMILY MEDICINE | Facility: CLINIC | Age: 65
End: 2025-02-27
Payer: MEDICARE

## 2025-02-27 DIAGNOSIS — R22.41 LOCALIZED SWELLING, MASS AND LUMP, RIGHT LOWER LIMB: ICD-10-CM

## 2025-02-27 DIAGNOSIS — Z86.718 HISTORY OF DEEP VEIN THROMBOSIS (DVT) OF LOWER EXTREMITY: Primary | ICD-10-CM

## 2025-03-05 ENCOUNTER — TELEPHONE (OUTPATIENT)
Dept: SPORTS MEDICINE | Facility: CLINIC | Age: 65
End: 2025-03-05
Payer: MEDICARE

## 2025-03-05 ENCOUNTER — PATIENT MESSAGE (OUTPATIENT)
Dept: FAMILY MEDICINE | Facility: CLINIC | Age: 65
End: 2025-03-05
Payer: MEDICARE

## 2025-03-05 NOTE — TELEPHONE ENCOUNTER
Attempted to contact patient to inform her that Dr. Small would like to focus on one body part for her initial visit. Left message stating to give us a call back for which body part is giving her the most pain. Left call back [phone].

## 2025-03-06 ENCOUNTER — RESULTS FOLLOW-UP (OUTPATIENT)
Dept: HEMATOLOGY/ONCOLOGY | Facility: CLINIC | Age: 65
End: 2025-03-06

## 2025-03-06 ENCOUNTER — RESULTS FOLLOW-UP (OUTPATIENT)
Dept: FAMILY MEDICINE | Facility: CLINIC | Age: 65
End: 2025-03-06
Payer: MEDICARE

## 2025-03-11 ENCOUNTER — LAB VISIT (OUTPATIENT)
Dept: LAB | Facility: OTHER | Age: 65
End: 2025-03-11
Attending: NURSE PRACTITIONER
Payer: MEDICARE

## 2025-03-11 ENCOUNTER — OFFICE VISIT (OUTPATIENT)
Dept: PAIN MEDICINE | Facility: CLINIC | Age: 65
End: 2025-03-11
Payer: MEDICARE

## 2025-03-11 VITALS
SYSTOLIC BLOOD PRESSURE: 123 MMHG | DIASTOLIC BLOOD PRESSURE: 74 MMHG | RESPIRATION RATE: 18 BRPM | TEMPERATURE: 99 F | OXYGEN SATURATION: 98 % | HEART RATE: 87 BPM | BODY MASS INDEX: 27.28 KG/M2 | WEIGHT: 144.38 LBS

## 2025-03-11 DIAGNOSIS — M79.18 MYOFASCIAL PAIN: ICD-10-CM

## 2025-03-11 DIAGNOSIS — G89.4 CHRONIC PAIN DISORDER: ICD-10-CM

## 2025-03-11 DIAGNOSIS — Z98.1 S/P LUMBAR SPINAL FUSION: ICD-10-CM

## 2025-03-11 DIAGNOSIS — Z79.891 ENCOUNTER FOR LONG-TERM OPIATE ANALGESIC USE: ICD-10-CM

## 2025-03-11 DIAGNOSIS — M47.816 LUMBAR SPONDYLOSIS: Primary | ICD-10-CM

## 2025-03-11 PROCEDURE — 3078F DIAST BP <80 MM HG: CPT | Mod: CPTII,S$GLB,, | Performed by: NURSE PRACTITIONER

## 2025-03-11 PROCEDURE — 80366 DRUG SCREENING PREGABALIN: CPT | Performed by: NURSE PRACTITIONER

## 2025-03-11 PROCEDURE — 99999 PR PBB SHADOW E&M-EST. PATIENT-LVL V: CPT | Mod: PBBFAC,,, | Performed by: NURSE PRACTITIONER

## 2025-03-11 PROCEDURE — 3008F BODY MASS INDEX DOCD: CPT | Mod: CPTII,S$GLB,, | Performed by: NURSE PRACTITIONER

## 2025-03-11 PROCEDURE — 1159F MED LIST DOCD IN RCRD: CPT | Mod: CPTII,S$GLB,, | Performed by: NURSE PRACTITIONER

## 2025-03-11 PROCEDURE — 99214 OFFICE O/P EST MOD 30 MIN: CPT | Mod: S$GLB,,, | Performed by: NURSE PRACTITIONER

## 2025-03-11 PROCEDURE — 1160F RVW MEDS BY RX/DR IN RCRD: CPT | Mod: CPTII,S$GLB,, | Performed by: NURSE PRACTITIONER

## 2025-03-11 PROCEDURE — 3074F SYST BP LT 130 MM HG: CPT | Mod: CPTII,S$GLB,, | Performed by: NURSE PRACTITIONER

## 2025-03-11 RX ORDER — MORPHINE SULFATE 15 MG/1
15 TABLET, FILM COATED, EXTENDED RELEASE ORAL 2 TIMES DAILY
Qty: 60 TABLET | Refills: 0 | Status: SHIPPED | OUTPATIENT
Start: 2025-03-11

## 2025-03-11 NOTE — PROGRESS NOTES
Pain Follow up Note- Established Patient         Subjective:      Patient ID: Joyce Peterson is a 64 y.o. female.    Chief Complaint: Low-back Pain    Referred by: No ref. provider found     Interval History 3/11/2025:  The patient is here for follow up of lower back pain. She feels that her back pain has worsened over the past couple of months. She recently had an extended vacation to [place] and [place] and experienced significant back pain. She says that it felt like nerve pain. She had decreased Lyrica from TID to BID at that time because she was running out.  She is frustrated due to continued pain. She did reach out to neurosurgery, Dr. Schuster. She had a recent lumbar CT and is scheduled for review tomorrow. She takes MS Contin 15 mg Q12 which helps her pain. 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 Her pain today is 5/10.    Interval History 1/6/2025:  The patient returns today for follow up of chronic pain back pain. Since previous visit, she has been weaning down MS Contin. She is now on 15 mg q12h. She tried skipping a dose yesterday but says that she felt jittery. Otherwise, she has done well with regards to decreasing the medication. She has been more active at home which she is happy about. She still has difficulty with walking and feels unsteady. She did complete PT and plans to join a gym. She is also having more right hip pain recently, mainly at night when she lays on the right side. She says that hip injections in the past were not very helpful. Her pain today is 0/10.    Interval History 10/29/2024:  Mrs. Peterson returns today for follow up of chronic back, hip and knee pain. She was previously changed from oxycodone to long acting morphine by Dr. Mayfield was chronic pain. She is taking MS Contin 30mg Q12. She reports significant benefit of pain with this regimen. With the medication, she has no pain. She would like to  start weaning down the medication. It was already sent to the pharmacy for today. She has not been able to be as active as she would like to be since her surgeries. She is able to walk short distances. Her pain today is 0/10.    Interval history: 7/25/24:   Joyce Peterson returns to clinic for opioid management for her chronic low back pain, BL hip and knee pain. At her last visit, oxycodone 10 mg was discontinued and patient was started on 30 mg ER morphine. Patient was instructed to use 10 mg oxycodone for breakthrough pain no more than 3 times daily. Today, she reports that her pain is much improved since the switch in pain medication to morphine. She states that her pain is currently 3/10 in severity with the majority of her pain being in the right knee, localized around the lateral joint line. She is s/p L2-pelvic fusion and states that she was doing home health therapy but is about to start outpatient therapy. She reports that her pain is typically worse at the end of the day with throbbing in the right knee. Of note, she does have DVT present in the RLE. She also uses Tylenol and Lyrica for pain which helps. She denies any new weakness, paresthesias, changes in bowel or bladder function, or saddle anesthesia.      Interval History 7/17/2024:   Joyce Peterson with a history of RA and lumbar radiculopathy s/p L2-pelvis fusion, L5-S1 TLIF comes to the clinic for opioid management for her for chronic lower back, B/L hips and B/L knee pain. Patient's current opioid management is done by [facility] but they will not prescirbe Oxycodone 10 Q4 hours beyond 6 weeks and recommended follow up with pain medicine. Patient said she gets good pain relief with the Oxycodone 10 Q4 hours. Pain is worse at nights, prolonged walking and sitting. She is also using Voltaren gel, Lyrica and Zanaflex. Will use tylenol PRN for break through pain. The pain is mostly in her hips and knees bilaterally.  Her lower back pain with  radiation down her legs have improved after the surgery.  Patient said she got good pain relief and restoration of function with PT but her days were reduced from 3 days to 2 days. She has follow up with [facility] and Rheumatology.     Interval History 6/27/2024:  Joyce Peterson returns to clinic for follow-up for chronic back pain. She is s/p L2-pelvis fusion on 6/12/2024. She has noticed increased pain over the last 3 nights. She had a visit with [facility] on 6/25/2024 and was told this was normal healing and nerve regeneration from the surgery. The patient said [facility] will not prescribe her pain longer than 6 weeks s/p surgery.  Her current regimen from [facility] is oxycodone 10 mg q4 hours, hydromorphone 1 mg for breakthrough pain.  She also takes Lyrica 150 mg TID and Robaxin 750 mg QID.  She is not supposed to take Naproxen s/p surgery, but has taken it a couple times for severe pain.  The patient denies fever/night sweats, urinary incontinence, bowel incontinence, significant weight changes, significant motor weakness or changes, or loss of sensations. Her pain today is 6/10.    Interval History 4/17/2024:   Patient returns to clinic for follow up of low back and hip pain. She was scheduled for SCS trial however trial was not completed due to patient preference. She states she has been seen by neurosugery and orthopedic surgery, for which she plans to undergo back surgery. 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  She reports pain has been worsening  since last visit. She states she continues to take lyrica TID, robaxin 750mg QID, oxycodone 5 q.i.d. p.r.n.  for pain control, she states this has not provided her with relief x the last 3 weeks. She reports she has not been able to participate in HEP due to pain. Pain level at time of exam is reported to be 8.5/10. Patient requests she wants to return to a previous regimen  of percocet and dilaudid until she is able to have back procedure. She expresses excruciating pain that runs down bilateral pain with any movement and with coughing.     Interval history  She returns for virtual follow up of back and hip pain. At her last OV, Dr. Mayfield recommended [place] SCS trial. She had her first part of the psych consult and has the second part this afternoon. She has had limited benefit with multiple interventions in the past including back surgery, procedures, PT and medications. She would like to move forward with SCS trial. Her pain today is 10/10.    Interval History 1/16/24:  Joyce Peterson returns for virtual follow up of low back and left hip pain. She is s/p L4/5 TLIF 6/22/23. She is planning for a left hip RFA and SCS trial with us. 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 She has since had an MRI of her L Spine in December that demonstrated a large amount of extruded disc material extending superiorly from L4-5 into the spinal canal with additional grade 2 anterolisthesis at this level contributing to severe spinal canal stenosis. She reports intermittent weakness in her left > right leg. Patient denies saddle anesthesia, bladder/bowel incontinence, ataxia, or increased falls. Shopping cart sign +. She is able to walk 1 block before needing to stop. Pain improves upon sitting. Pain currently ranges from 5-9/10, currently a 7/10. Pain radiates down her both her legs anterolaterally down to the ankle with additional shin pain. She has been on percocet 5-325 QID PRN and lyrica 150mg TID. She received opioids from Ortho 5/325 in October addition to our Rx. Ortho saw patient today and is recommending an L2-Pelvis    Pertinent Surgeries:  6/12/2024 - L2-Pelvis Fusion ([place])    Pain Medication   15 mg Morphine extended release   Pregabalin 150 mg TID  Voltaren Gel   Tylenol     Past Medical History:   Diagnosis Date    Acid reflux     Allergy     Anemia      Asthma     Coronary artery disease     CS (cervical spondylosis) 03/08/2013    Degenerative disc disease     Degenerative joint disease of hindfoot, left 6/28/2022    Dry eyes     Dry mouth     Gastroparesis     History of methotrexate therapy 01/19/2022    Hyperlipidemia     Lateral meniscus derangement 04/06/2016    Lobular carcinoma in situ     Lumbar spondylosis 03/08/2013    Osteoarthritis     Osteoporosis     Pes planovalgus, acquired, left 6/28/2022    Rheumatoid arthritis(714.0)     Rupture of left triceps tendon 10/17/2018    Umbilical hernia 08/13/2015     Past Surgical History:   Procedure Laterality Date    BACK SURGERY  06/12/2024    revision of prior back surgery on 07/14/2023    BREAST BIOPSY Left 01/29/2002    core bx    CARPAL TUNNEL RELEASE Right 05/2017    CHOLECYSTECTOMY  2004    COLONOSCOPY      10/11    COLONOSCOPY N/A 06/29/2017    Procedure: COLONOSCOPY;  Surgeon: López Moore MD;  Location: [facility] ENDO ([address] [place] [address]);  Service: Endoscopy;  Laterality: N/A;    EPIDURAL STEROID INJECTION N/A 11/18/2021    Procedure: INJECTION, STEROID, EPIDURAL, L5-S1 IL NEED CONSENT;  Surgeon: Tj Mayfield MD;  Location: [facility] PAIN MGT;  Service: Pain Management;  Laterality: N/A;    EPIDURAL STEROID INJECTION N/A 09/29/2022    Procedure: LUMBAR L3/L4 IL MADELINE CONTRAST;  Surgeon: Tj Mayfield MD;  Location: [facility] PAIN MGT;  Service: Pain Management;  Laterality: N/A;    EPIDURAL STEROID INJECTION N/A 12/12/2022    Procedure: INJECTION, STEROID, EPIDURAL L5/S1 IL CONTRAST;  Surgeon: Tj Mayfield MD;  Location: [facility] PAIN MGT;  Service: Pain Management;  Laterality: N/A;    EPIDURAL STEROID INJECTION N/A 04/06/2023    Procedure: INJECTION, STEROID, EPIDURAL L5/S1;  Surgeon: Tj Mayfield MD;  Location: [facility] PAIN MGT;  Service: Pain Management;  Laterality: N/A;    FOOT ARTHRODESIS Left 01/11/2023    Procedure: FUSION, FOOT;  Surgeon: Ariella Finnegan MD;  Location: [facility] OR;  Service: Orthopedics;  Laterality: Left;   nimbus    FUSION, SPINE, LUMBAR, TLIF, POSTERIOR APPROACH, USING PEDICLE SCREW N/A 06/22/2023    Procedure: FUSION, SPINE, LUMBAR, TLIF, POSTERIOR APPROACH, USING PEDICLE SCREW L4/5 spinewave SNS:SSEP/EMG;  Surgeon: Jh Mosqueda MD;  [facility];  Service: Neurosurgery;  Laterality: N/A;    HARVESTING OF BONE GRAFT Left 01/11/2023    Procedure: SURGICAL PROCUREMENT, BONE GRAFT;  Surgeon: Ariella Finnegan MD;  Location: [facility] OR;  Service: Orthopedics;  Laterality: Left;    INJECTION OF ANESTHETIC AGENT AROUND NERVE Bilateral 08/23/2021    Procedure: BLOCK, NERVE, MEDIAL BRANCH L3,L4,L5;  Surgeon: Tj Mayfield MD;  Location: [facility] PAIN MGT;  Service: Pain Management;  Laterality: Bilateral;  1 of 2    INJECTION OF ANESTHETIC AGENT AROUND NERVE Bilateral 08/26/2021    Procedure: BLOCK, NERVE, MEDIAL BRANCH L3,L4,L5;  Surgeon: Tj Mayfield MD;  Location: [facility] PAIN MGT;  Service: Pain Management;  Laterality: Bilateral;  2 of 2    INJECTION OF ANESTHETIC AGENT AROUND NERVE Left 07/07/2022    Procedure: BLOCK, NERVE, LEFT OBTURATOR AND FEMORAL;  Surgeon: Tj Mayfield MD;  Location: [facility] PAIN MGT;  Service: Pain Management;  Laterality: Left;    INJECTION OF FACET JOINT Bilateral 03/12/2020    Procedure: FACET JOINT INJECTION (LUMBAR BLOCK) BILATERAL L4-5 AND L5-S1 DIRECT REFERRAL;  Surgeon: Tj Mayfield MD;  Location: [facility] PAIN MGT;  Service: Pain Management;  Laterality: Bilateral;  NEEDS CONSENT    INJECTION OF FACET JOINT Bilateral 03/29/2021    Procedure: INJECTION, FACET JOINT L3/4, L4/5, L5/S1;  Surgeon: Tj Mayfield MD;  Location: [facility] PAIN MGT;  Service: Pain Management;  Laterality: Bilateral;    INJECTION OF JOINT Right 07/30/2020    Procedure: INJECTION, JOINT, RIGHT HIP Interarticular under flouro;  Surgeon: Tj Mayfield MD;  Location: [facility] PAIN MGT;  Service: Pain Management;  Laterality: Right;  INJECTION, JOINT, RIGHT HIP Interarticular under flouro    INJECTION OF JOINT Right 02/21/2022     Procedure: Injection, Joint RIGHT ISCHIAL BURSA;  Surgeon: Tj Mayfield MD;  Location: [facility] PAIN MGT;  Service: Pain Management;  Laterality: Right;    INTRAMEDULLARY RODDING OF FEMUR Left 05/21/2019    Procedure: INSERTION, INTRAMEDULLARY ARIEL, FEMUR;  Surgeon: López Duff MD;  Location: [facility] OR 2ND FLR;  Service: Orthopedics;  Laterality: Left;    LENGTHENING OF TENDON OF LOWER EXTREMITY Left 01/11/2023    Procedure: LENGTHENING, TENDON, LOWER EXTREMITY;  Surgeon: Ariella Finnegan MD;  Location: [facility] OR;  Service: Orthopedics;  Laterality: Left;    MAGNETIC RESONANCE IMAGING N/A 05/29/2023    Procedure: MRI (Magnetic Resonance Imagine);  Surgeon: Sujey Surgeon;  Location: [facility];  Service: Anesthesiology;  Laterality: N/A;    MAGNETIC RESONANCE IMAGING N/A 01/10/2024    Procedure: MRI (Magnetic Resonance Imagine);  Surgeon: Sujey Robin;  Location: [facility] SUJEY;  Service: Anesthesiology;  Laterality: N/A;    RADIOFREQUENCY ABLATION Left 09/20/2021    Procedure: RADIOFREQUENCY ABLATION left L3,4,5 RFA  1 of 2  CONSENT NEEDED;  Surgeon: Tj Mayfield MD;  Location: [facility] PAIN MGT;  Service: Pain Management;  Laterality: Left;    RADIOFREQUENCY ABLATION Right 10/04/2021    Procedure: RADIOFREQUENCY ABLATION  right L3,4,5 RFA   2 of 2  CONSENT NEEDED;  Surgeon: Tj Mayfield MD;  Location: [facility] PAIN MGT;  Service: Pain Management;  Laterality: Right;    RADIOFREQUENCY ABLATION Left 04/21/2022    Procedure: Radiofrequency Ablation LEFT L3,L4,L5;  Surgeon: Tj Mayfield MD;  Location: [facility] PAIN MGT;  Service: Pain Management;  Laterality: Left;    RADIOFREQUENCY ABLATION Right 05/12/2022    Procedure: Radiofrequency Ablation RIGHT L3,L4,L5;  Surgeon: Tj Mayfield MD;  Location: [facility] PAIN MGT;  Service: Pain Management;  Laterality: Right;    RADIOFREQUENCY ABLATION Left 07/25/2022    Procedure: Radiofrequency Ablation Left Femoral & Obturator;  Surgeon: Tj Mayfield MD;  Location: [facility] PAIN MGT;  Service: Pain  Management;  Laterality: Left;    REPAIR OF TRICEPS TENDON Left 10/17/2018    Procedure: REPAIR, TENDON, TRICEPS left elbow;  Surgeon: Staci Yarbrough MD;  Location: [facility] OR [address] [place] [address];  Service: Orthopedics;  Laterality: Left;  Anesthesia: General and regional. PRONE, k-wire , hand pan 1 and pan 2, CALL ARTHREX/Tamera notified 10-12 LO    TONSILLECTOMY      TRANSFORAMINAL EPIDURAL INJECTION OF STEROID Right 08/31/2020    Procedure: INJECTION, STEROID, EPIDURAL, TRANSFORAMINAL,  APPROACH, L3-L4 and L4-L5 need consent;  Surgeon: Tj Mayfield MD;  Location: [facility] PAIN MGT;  Service: Pain Management;  Laterality: Right;    TRANSFORAMINAL EPIDURAL INJECTION OF STEROID Bilateral 07/23/2021    Procedure: INJECTION, STEROID, EPIDURAL, TRANSFORAMINAL APPROACH L4/5;  Surgeon: Tj Mayfield MD;  Location: [facility] PAIN MGT;  Service: Pain Management;  Laterality: Bilateral;    TRANSFORAMINAL EPIDURAL INJECTION OF STEROID Bilateral 09/25/2023    Procedure: LUMBAR TRANSFORAMINAL BILATERAL  L2/3 DIRECT REFERRAL;  Surgeon: Tj Mayfield MD;  Location: [facility] PAIN MGT;  Service: Pain Management;  Laterality: Bilateral;    TRANSFORAMINAL EPIDURAL INJECTION OF STEROID Left 10/18/2023    Procedure: LUMBAR TRANSFORAMINAL LEFT L3/4 AND L4/5 * CLEARANCE IN CHART*;  Surgeon: Tj Mayfield MD;  Location: [facility] PAIN MGT;  Service: Pain Management;  Laterality: Left;    TRIGGER POINT INJECTION N/A 07/23/2021    Procedure: INJECTION, TRIGGER POINT SCAPULAR;  Surgeon: Tj Mayfield MD;  Location: [facility] PAIN MGT;  Service: Pain Management;  Laterality: N/A;    TUBAL LIGATION  2003    UPPER GASTROINTESTINAL ENDOSCOPY      10/11    uterine ablation  2003     Review of patient's allergies indicates:   Allergen Reactions    Actemra [tocilizumab] Other (See Comments)     Severe dizziness    Codeine Nausea And Vomiting and Hives    Gold au 198 Hives and Rash    Hydroxychloroquine Other (See Comments)     Can't remember the reaction       "Iodinated contrast media Other (See Comments)     Other reaction(s): BURNING ALL OVER    Iodine Other (See Comments) and Hives     Other reaction(s): BURNING ALL OVER - Iodine dye - Not topical    Ondansetron Nausea And Vomiting    Sulfa (sulfonamide antibiotics) Other (See Comments)     Can't remember the reaction    Zofran [ondansetron hcl (pf)] Nausea And Vomiting     Pt reports that last time she received zofran she started vomiting again    Methotrexate analogues Nausea Only    Pneumococcal vaccine Other (See Comments)    Pneumovax 23 [pneumococcal 23-argenis ps vaccine] Other (See Comments)     "sick"    Methotrexate Nausea Only     Current Outpatient Medications   Medication Sig Dispense Refill    albuterol (PROVENTIL/VENTOLIN HFA) 90 mcg/actuation inhaler Inhale 2 puffs into the lungs every 6 (six) hours as needed for Wheezing. Rescue 20.1 g 11    albuterol-ipratropium (DUO-NEB) 2.5 mg-0.5 mg/3 mL nebulizer solution Take 3 mLs by nebulization every 6 (six) hours as needed for Wheezing. Rescue 90 mL 3    BD ULTRA-FINE JAIME PEN NEEDLE 32 gauge x 5/32" Ndle For Forteo - inject daily 100 each 3    benzonatate (TESSALON PERLES) 100 MG capsule Take 2 capsules (200 mg total) by mouth 3 (three) times daily as needed for Cough. 30 capsule 1    budesonide-formoterol 160-4.5 mcg (SYMBICORT) 160-4.5 mcg/actuation HFAA Inhale 2 puffs into the lungs every 12 (twelve) hours. 30.6 g 3    calcium citrate-vitamin D3 315-200 mg (CITRACAL+D) 315 mg-5 mcg (200 unit) per tablet Take 1 tablet by mouth 2 (two) times daily.       cefdinir (OMNICEF) 300 MG capsule Take 1 capsule (300 mg total) by mouth every 12 (twelve) hours. 60 capsule 0    cycloSPORINE (RESTASIS) 0.05 % ophthalmic emulsion Place 1 drop into both eyes 2 (two) times daily. 180 each 3    diazePAM (VALIUM) 10 MG Tab Take 1 tablet (10 mg total) by mouth every evening. 2 tablet 0    diazePAM (VALIUM) 5 MG tablet Take 1 tablet by mouth every 12 (twelve) hours as needed " (severe breakthrough muscle spasms) for up to 7 days 14 tablet 0    diclofenac sodium (VOLTAREN) 1 % Gel APPLY 2 GRAMS TOPICALLY 4 (FOUR) TIMES DAILY AS NEEDED. 300 g 2    fexofenadine (ALLEGRA) 180 MG tablet Take 1 tablet (180 mg total) by mouth once daily. 90 tablet 3    fluconazole (DIFLUCAN) 150 MG Tab Take 150 mg by mouth as needed.      fluorometholone 0.1% (FML) 0.1 % DrpS Place 1 drop into both eyes 4 (four) times daily. for 10 days 10 mL 3    HYDROmorphone (DILAUDID) 4 MG tablet Take 0.5 tablets (2 mg total) by mouth every 12 (twelve) hours as needed for Pain. (Patient not taking: Reported on 3/11/2025) 60 tablet 0    INV PROPULSID 10 MG Take 2 tablets (20 mg) by mouth 4 (four) times daily. FOR INVESTIGATIONAL USE ONLY. Protocol [id number]-154 Subject ID: [id number]. 1440 each 0    leflunomide (ARAVA) 20 MG Tab Take 1 tablet (20 mg total) by mouth once daily. 90 tablet 0    LIDOcaine (LIDODERM) 5 % Place 1 patch onto the skin once daily. Remove & Discard patch within 12 hours or as directed by MD (Patient not taking: Reported on 3/11/2025) 30 patch 1    lifitegrast (XIIDRA) 5 % Dpet INSTILL ONE DROP INTO BOTH EYES TWICE A DAY 60 mL 10    magic mouthwash diphen/antac/lidoc rinse mouth with 5 ml for 30 seconds and spit out, twice daily 150 mL 2    methenamine (HIPREX) 1 gram Tab Take 1 tablet (1 g total) by mouth 2 (two) times daily. 60 tablet 11    mirabegron (MYRBETRIQ) 25 mg Tb24 ER tablet Take 1 tablet (25 mg total) by mouth once daily. 30 tablet 11    montelukast (SINGULAIR) 10 mg tablet Take 1 tablet (10 mg total) by mouth every evening. 90 tablet 3    morphine (MS CONTIN) 15 MG 12 hr tablet Take 1 tablet (15 mg total) by mouth 2 (two) times daily. 60 tablet 0    naloxone (NARCAN) 4 mg/actuation Spry 4mg by nasal route as needed for opioid overdose; may repeat every 2-3 minutes in alternating nostrils until medical help arrives. Call 911 2 each 11    naproxen (NAPROSYN) 500 MG tablet TAKE 1 TABLET BY MOUTH  TWICE A DAY AS NEEDED 180 tablet 0    omeprazole-sodium bicarbonate (ZEGERID) 40-1.1 mg-gram per capsule TAKE 1 CAPSULE BY MOUTH EVERY DAY IN THE MORNING 90 capsule 3    oxyCODONE (ROXICODONE) 10 mg Tab immediate release tablet Take 1 tablet by mouth every 4 (four) hours as needed (acute post op pain) for up to 7 days Max Daily Amount: 60 mg (Patient not taking: Reported on 3/11/2025) 42 tablet 0    pantoprazole (PROTONIX) 40 MG tablet Take 1 tablet (40 mg total) by mouth 2 (two) times daily. 180 tablet 3    pentoxifylline (TRENTAL) 400 mg TbSR Take 1 tablet (400 mg total) by mouth 2 (two) times a day. 180 tablet 0    potassium chloride SA (K-DUR,KLOR-CON) 20 MEQ tablet Take 1 tablet (20 mEq total) by mouth once daily. 90 tablet 3    predniSONE (DELTASONE) 1 MG tablet Take 1 tablet (1 mg total) by mouth once daily. 90 tablet 1    predniSONE (DELTASONE) 5 MG tablet Take 1 tablet (5 mg total) by mouth once daily. 90 tablet 1    pregabalin (LYRICA) 150 MG capsule Take 1 capsule (150 mg total) by mouth 3 (three) times daily. 270 capsule 3    promethazine (PHENERGAN) 25 MG tablet Take 1 tablet (25 mg total) by mouth every 6 (six) hours as needed for Nausea. 90 tablet 5    promethazine-dextromethorphan (PROMETHAZINE-DM) 6.25-15 mg/5 mL Syrp Take 10 mLs by mouth nightly as needed for cough 120 mL 0    rosuvastatin (CRESTOR) 20 MG tablet TAKE 1 TABLET BY MOUTH EVERY DAY IN THE EVENING 90 tablet 3    sarilumab (KEVZARA) 200 mg/1.14 mL Syrg Inject 1.14 mL (200 mg total) into the skin every 14 (fourteen) days. 2.28 mL 2    senna-docusate 8.6-50 mg (PERICOLACE) 8.6-50 mg per tablet Take 1 tablet by mouth daily 30 tablet 1    STIMULANT LAXATIVE PLUS 8.6-50 mg per tablet Take 1 tablet by mouth.      sucralfate (CARAFATE) 1 gram tablet TAKE 1 TABLET BY MOUTH FOUR TIMES A  tablet 2    teriparatide (FORTEO) 20 mcg/dose (600mcg/2.4mL) PnIj Inject 0.08 mLs (20 mcg total) into the skin once daily. 2.4 mL 11    tiZANidine  (ZANAFLEX) 4 MG tablet Take 1 tablet (4 mg total) by mouth every 6 (six) hours as needed (for muscle spasms). 120 tablet 1    traMADoL (ULTRAM) 50 mg tablet Take 50 mg by mouth every 6 (six) hours as needed. (Patient not taking: Reported on 3/11/2025)      triamcinolone (NASACORT) 55 mcg nasal inhaler 2 sprays by Nasal route once daily. 16.9 mL 5     No current facility-administered medications for this visit.     Family History   Problem Relation Name Age of Onset    Cancer Mother 69         Lung Cancer    Emphysema Mother 69     Heart attack Mother 69     Alcohol abuse Mother 69     Cancer Father          Lung Cancer    Osteoarthritis Father      Lung cancer Father      Skin cancer Father      Alcohol abuse Father      Heart disease Brother 57     Heart attack Brother 57     Alcohol abuse Brother 57     Cirrhosis Brother 57     Osteoarthritis Paternal Aunt      Retinal detachment Maternal Aunt      No Known Problems Sister      No Known Problems Maternal Uncle      No Known Problems Paternal Uncle      No Known Problems Maternal Grandmother      No Known Problems Maternal Grandfather      No Known Problems Paternal Grandmother      No Known Problems Paternal Grandfather      Colon cancer Neg Hx      Esophageal cancer Neg Hx      Stomach cancer Neg Hx      Celiac disease Neg Hx      Diabetes Neg Hx      Thyroid disease Neg Hx      Amblyopia Neg Hx      Blindness Neg Hx      Cataracts Neg Hx      Glaucoma Neg Hx      Hypertension Neg Hx      Macular degeneration Neg Hx      Strabismus Neg Hx      Stroke Neg Hx       Social History     Socioeconomic History    Marital status:    Tobacco Use    Smoking status: Never    Smokeless tobacco: Never   Substance and Sexual Activity    Alcohol use: Yes     Comment: 2-3 times per year.    Drug use: No    Sexual activity: Yes     Partners: Male     Birth control/protection: Surgical     Social Drivers of Health     Financial Resource Strain: Low Risk  (6/12/2024)     Received from [facility]    Overall Financial Resource Strain (CARDIA)     Difficulty of Paying Living Expenses: Not hard at all   Food Insecurity: No Food Insecurity (6/12/2024)    Received from [facility]    Hunger Vital Sign     Worried About Running Out of Food in the Last Year: Never true     Ran Out of Food in the Last Year: Never true   Transportation Needs: No Transportation Needs (6/12/2024)    Received from [facility]    PRAPARE - Transportation     Lack of Transportation (Medical): No     Lack of Transportation (Non-Medical): No   Physical Activity: Patient Unable To Answer (6/12/2024)    Received from [facility]    Exercise Vital Sign     Days of Exercise per Week: Patient unable to answer     Minutes of Exercise per Session: Patient unable to answer   Stress: No Stress Concern Present (6/12/2024)    Received from [facility]    [employer] - Occupational Stress Questionnaire     Feeling of Stress : Not at all   Housing Stability: Unknown (12/5/2023)    Housing Stability Vital Sign     Unable to Pay for Housing in the Last Year: Patient refused     Number of Places Lived in the Last Year: 1     Unstable Housing in the Last Year: No     REVIEW OF SYSTEMS:    GENERAL:  No weight loss, malaise or fevers.  HEENT:  Negative for frequent or significant headaches.  NECK:  Negative for lumps, goiter, pain and significant neck swelling.  RESPIRATORY:  Negative for cough, wheezing or shortness of breath.  CARDIOVASCULAR:  Negative for chest pain, leg swelling or palpitations.  GI:  Negative for abdominal discomfort, blood in stools or black stools or change in bowel habits.  MUSCULOSKELETAL:  See HPI. +Back pain.   SKIN:  Negative for lesions, rash, and itching.  PSYCH:  Negative for sleep disturbance, mood disorder and recent psychosocial stressors.  HEMATOLOGY/LYMPHOLOGY:  Negative for prolonged bleeding, bruising easily or swollen nodes.  NEURO:   No history of headaches,  syncope, paralysis, seizures or tremors.  All other reviewed and negative other than HPI.       Objective:   /74   Pulse 87   Temp 98.8 °F (37.1 °C)   Resp 18   Wt 65.5 kg (144 lb 6.4 oz)   LMP  (LMP Unknown)   SpO2 98%   BMI 27.28 kg/m²   Pain Disability Index Review:      3/11/2025    10:26 AM 1/6/2025    10:50 AM 7/25/2024     2:47 PM   Last 3 PDI Scores   Pain Disability Index (PDI) 35 48 54     PHYSICAL EXAMINATION:    GENERAL APPEARANCE: Well appearing, in no acute distress. Using Wheelchair for mobility  PSYCH:  Mood and affect appropriate.  SKIN: Skin color, texture, turgor normal, no rashes or lesions to visible areas.  HEAD/FACE:  Normocephalic, atraumatic.  NECK: No pain to palpation over the cervical paraspinous muscles. Spurling Negative. No pain with neck flexion, extension, or lateral flexion.   CARDIO: Rate regular.  No lower extremity edema. Capillary refill <2 seconds.   PULM: Bilateral chest rise. No apparent respiratory distress.   GI:  Non-distended  BACK: Straight leg raising in the sitting position is negative to radicular pain. Negative SANDRA/FADIR. Positive facet loading bilaterally. Limited ROM with pain on extension and flexion. TTP over lumbar paraspinal muscles bilaterally.  EXTREMITIES: Peripheral joint ROM is full and pain free without obvious instability or laxity in all four extremities. No deformities, edema, or skin discoloration.   MUSCULOSKELETAL: Bilateral upper and lower extremity strength is normal and symmetric.  No atrophy or tone abnormalities are noted.   NEURO: Bilateral upper and lower extremity coordination and muscle stretch reflexes are physiologic and symmetric.   GAIT: Antalgic- presents in wheelchair.        Assessment:     Joyce Peterson is a 64 y.o. female with history of RA and s/p L2-pelvic fusion that presents for chronic low back, knee, and hip pain.      Encounter Diagnoses   Name Primary?    Lumbar spondylosis Yes    Myofascial pain      Chronic pain disorder     S/P lumbar spinal fusion     Encounter for long-term opiate analgesic use          Plan:   We discussed with the patient the assessment and recommendations. The following is the plan we agreed on:    Previous records and imaging reviewed.   She previously did well decreasing MS Contin to 15 mg Q12h. She has been unable to decrease further given increased pain. Will continue at current dose.   Continue to follow up with Neurosurgery, ID and Rheumatology.  Order placed for physical therapy.  Can consider lumbar RFA- she would need to repeat MBBx2. She may also need clearance from ID.  Continue with home therapy exercises.   UDS today.  RTC in 1 month or sooner if needed.    - Dr. Mayfield was consulted on the patient and agrees with this plan.    The above plan and management options were discussed at length with patient. Patient is in agreement with the above and verbalized understanding.     Valarie Law, JAYME  03/11/2025

## 2025-03-12 ENCOUNTER — PATIENT MESSAGE (OUTPATIENT)
Dept: GASTROENTEROLOGY | Facility: CLINIC | Age: 65
End: 2025-03-12
Payer: MEDICARE

## 2025-03-15 LAB
1OH-MIDAZOLAM UR QL SCN: NOT DETECTED
6MAM UR QL: NOT DETECTED
7AMINOCLONAZEPAM UR QL: NOT DETECTED
A-OH ALPRAZ UR QL: NOT DETECTED
ALPRAZ UR QL: NOT DETECTED
AMPHET UR QL SCN: NOT DETECTED
ANNOTATION COMMENT IMP: NORMAL
BARBITURATES UR QL: NEGATIVE
BUPRENORPHINE UR QL: NOT DETECTED
BZE UR QL: NEGATIVE
CARBOXYTHC UR QL: NEGATIVE
CARISOPRODOL UR QL: NEGATIVE
CLONAZEPAM UR QL: NOT DETECTED
CODEINE UR QL: NOT DETECTED
CREAT UR-MCNC: <20 MG/DL (ref 20–400)
DIAZEPAM UR QL: NOT DETECTED
ETHYL GLUCURONIDE UR QL: NEGATIVE
FENTANYL UR QL: NOT DETECTED
GABAPENTIN UR QL CFM: NOT DETECTED
HYDROCODONE UR QL: NOT DETECTED
HYDROMORPHONE UR QL: PRESENT
LORAZEPAM UR QL: NOT DETECTED
MDA UR QL: NOT DETECTED
MDEA UR QL: NOT DETECTED
MDMA UR QL: NOT DETECTED
ME-PHENIDATE UR QL: NOT DETECTED
METHADONE UR QL: NEGATIVE
METHAMPHET UR QL: NOT DETECTED
MIDAZOLAM UR QL SCN: NOT DETECTED
MORPHINE UR QL: PRESENT
NALOXONE UR QL CFM: NOT DETECTED
NORBUPRENORPHINE UR QL CFM: NOT DETECTED
NORDIAZEPAM UR QL: NOT DETECTED
NORFENTANYL UR QL: NOT DETECTED
NORHYDROCODONE UR QL CFM: NOT DETECTED
NORMEPERIDINE UR QL CFM: NOT DETECTED
NOROXYCODONE UR QL CFM: NOT DETECTED
NOROXYMORPHONE UR QL SCN: NOT DETECTED
OXAZEPAM UR QL: NOT DETECTED
OXYCODONE UR QL: NOT DETECTED
OXYMORPHONE UR QL: NOT DETECTED
PATHOLOGY STUDY: NORMAL
PCP UR QL: NEGATIVE
PHENTERMINE UR QL: NOT DETECTED
PREGABALIN UR QL CFM: PRESENT
SERVICE CMNT-IMP: NORMAL
TAPENTADOL UR QL SCN: NOT DETECTED
TAPENTADOL UR QL SCN: NOT DETECTED
TEMAZEPAM UR QL: NOT DETECTED
TRAMADOL UR QL: NEGATIVE
ZOLPIDEM PHENYL-4-CARB UR QL SCN: NOT DETECTED
ZOLPIDEM UR QL: NOT DETECTED

## 2025-03-18 ENCOUNTER — PATIENT MESSAGE (OUTPATIENT)
Dept: OTOLARYNGOLOGY | Facility: CLINIC | Age: 65
End: 2025-03-18
Payer: MEDICARE

## 2025-03-18 DIAGNOSIS — K31.84 GASTROPARESIS: ICD-10-CM

## 2025-03-18 RX ORDER — AMOXICILLIN 250 MG
1 CAPSULE ORAL
Qty: 30 TABLET | Refills: 1 | Status: SHIPPED | OUTPATIENT
Start: 2025-03-18

## 2025-03-18 RX ORDER — SUCRALFATE 1 G/1
1 TABLET ORAL 4 TIMES DAILY
Qty: 360 TABLET | Refills: 2 | Status: SHIPPED | OUTPATIENT
Start: 2025-03-18

## 2025-03-18 NOTE — TELEPHONE ENCOUNTER
No care due was identified.  [facility] Embedded Care Due Messages. Reference number: [id number].   3/18/2025 2:00:21 PM CDT

## 2025-03-18 NOTE — TELEPHONE ENCOUNTER
Yes, this occasionally happens when you dive for the hyperbaric treatments.  Other than ensuring you were using your allergy nasal sprays and other allergy medications, if it is not clearing the fluid, we can try a course of steroids, or some patients do have to have a tube placed in the ear for the time that there undergoing the treatments.    I can see about an appointment coming up with me to recheck the ear if needed.  My staff or copied on this message.    Love Whaley MD  [facility]    This note was created by combination of typed  and MModal dictation.  Transcription errors may be present.  If there are any questions, please contact me.

## 2025-03-19 ENCOUNTER — TELEPHONE (OUTPATIENT)
Dept: OTOLARYNGOLOGY | Facility: CLINIC | Age: 65
End: 2025-03-19
Payer: MEDICARE

## 2025-03-19 NOTE — TELEPHONE ENCOUNTER
Returned call. Pt stated that her right ear flet clogged all night and still has not cleared. Pt notified her provider and was advised to f/u with ENT for a possible PE tube. Appt scheduled with Dr. Whaley for 3/20 at 11AM. Pt thanked me and verbalized understanding.     ----- Message from Calli sent at 3/19/2025  8:02 AM CDT -----  Type:  Needs Medical AdviceWho Called: pt TONNY GOMEZ [[id number]]Would the patient rather a call back or a response via [facility]? Call [facility] Call Back Number: [phone] Additional Information: pt requesting call back in regards to having tube placed in ear per conversation via chart per pt

## 2025-03-20 ENCOUNTER — CLINICAL SUPPORT (OUTPATIENT)
Dept: OTOLARYNGOLOGY | Facility: CLINIC | Age: 65
End: 2025-03-20
Payer: MEDICARE

## 2025-03-20 ENCOUNTER — OFFICE VISIT (OUTPATIENT)
Dept: OTOLARYNGOLOGY | Facility: CLINIC | Age: 65
End: 2025-03-20
Payer: MEDICARE

## 2025-03-20 VITALS
BODY MASS INDEX: 27.47 KG/M2 | DIASTOLIC BLOOD PRESSURE: 74 MMHG | SYSTOLIC BLOOD PRESSURE: 109 MMHG | WEIGHT: 145.5 LBS | HEIGHT: 61 IN | HEART RATE: 76 BPM

## 2025-03-20 DIAGNOSIS — H61.91 LESION OF EXTERNAL EAR CANAL, RIGHT: Chronic | ICD-10-CM

## 2025-03-20 DIAGNOSIS — H91.93 HIGH FREQUENCY HEARING LOSS OF BOTH EARS: Primary | ICD-10-CM

## 2025-03-20 DIAGNOSIS — J31.0 CHRONIC RHINITIS: Chronic | ICD-10-CM

## 2025-03-20 DIAGNOSIS — H69.93 DYSFUNCTION OF BOTH EUSTACHIAN TUBES: Chronic | ICD-10-CM

## 2025-03-20 DIAGNOSIS — H65.01 NON-RECURRENT ACUTE SEROUS OTITIS MEDIA OF RIGHT EAR: ICD-10-CM

## 2025-03-20 DIAGNOSIS — T70.0XXA OTITIC BAROTRAUMA, INITIAL ENCOUNTER: Primary | ICD-10-CM

## 2025-03-20 PROCEDURE — 99999 PR PBB SHADOW E&M-EST. PATIENT-LVL IV: CPT | Mod: PBBFAC,,, | Performed by: OTOLARYNGOLOGY

## 2025-03-20 PROCEDURE — 3008F BODY MASS INDEX DOCD: CPT | Mod: CPTII,S$GLB,, | Performed by: OTOLARYNGOLOGY

## 2025-03-20 PROCEDURE — 99214 OFFICE O/P EST MOD 30 MIN: CPT | Mod: S$GLB,,, | Performed by: OTOLARYNGOLOGY

## 2025-03-20 PROCEDURE — 1160F RVW MEDS BY RX/DR IN RCRD: CPT | Mod: CPTII,S$GLB,, | Performed by: OTOLARYNGOLOGY

## 2025-03-20 PROCEDURE — 3074F SYST BP LT 130 MM HG: CPT | Mod: CPTII,S$GLB,, | Performed by: OTOLARYNGOLOGY

## 2025-03-20 PROCEDURE — 3078F DIAST BP <80 MM HG: CPT | Mod: CPTII,S$GLB,, | Performed by: OTOLARYNGOLOGY

## 2025-03-20 PROCEDURE — 1159F MED LIST DOCD IN RCRD: CPT | Mod: CPTII,S$GLB,, | Performed by: OTOLARYNGOLOGY

## 2025-03-20 RX ORDER — PREDNISONE 10 MG/1
TABLET ORAL
Qty: 10 TABLET | Refills: 0 | Status: SHIPPED | OUTPATIENT
Start: 2025-03-20

## 2025-03-20 RX ORDER — PREDNISONE 10 MG/1
10 TABLET ORAL DAILY
Qty: 10 TABLET | Refills: 0 | Status: SHIPPED | OUTPATIENT
Start: 2025-03-20 | End: 2025-03-20

## 2025-03-20 NOTE — PROGRESS NOTES
Chief Complaint   Patient presents with    Follow-up     Pt stated that her ear feels stopped up       HPI:  Patient is a 64 y.o. female who has previously seen me for chronic rhinitis, chronic otitis externa, right ear canal lesion, previous sialoadenitis.  At last visit she was treated with Ciprodex otic drops for right ear canal inflammation, and mupirocin for left nasal vestibule inflammation and small lesion.  She presents today for follow-up to re-examine these areas and assess response to therapy.    Since the last visit, the patient reports the nasal crusting and irritation has improved though does occasionally flare up.  She used Ciprodex drops on the right ear for short time, but they were causing pain with use so she stopped.  She has not noticed any otorrhea or other ongoing issues with the right ear, though she occasionally has mild discomfort.  She does complain of purulent nasal secretions and thick postnasal drip over the last few weeks.  She has some sinus pressure and ear pressure as well.  She does report a history of allergic rhinitis symptoms in the past and has had some occasional sinusitis when allergies flare.      Interval HPI 11/26/2024 :      Patient reports she has not had any significant otorrhea from the right.  She still continues to use Q-tips in the ears and does notice some scant blood in the ear when she does this.  Previous cultures from last visit did not grow any bacteria or fungus.    She also reports mild increase in her rhinitis symptoms over the last few days.  She uses flonase and azelastine occasionally, and she uses zyrtec, but antihistamines cause significant drying of her eyes.    Interval HPI 02/13/2025 :      Patient was using [employer] eardrops and felt the issue was improving so she had stopped use recently.  She does not report any changes or worsening of the right ear symptoms.  She has had an increase in her allergy/rhinitis symptoms over the last  "day.        Component 2 mo ago   Aerobic Bacterial Culture  Abnormal   STAPHYLOCOCCUS AUREUS  Many    Resulting Agency [facility]        Susceptibility     Staphylococcus aureus     CULTURE, AEROBIC  (SPECIFY SOURCE)     Clindamycin <=0.5 mcg/mL Resistant     Erythromycin >4 mcg/mL Resistant     Oxacillin <=0.25 mcg/mL Sensitive     Tetracycline <=4 mcg/mL Sensitive     Trimeth/Sulfa <=0.5/9.5 m... Sensitive                   Interval HPI 03/20/2025 :      Patient started hyperbaric O2 treatments and noted to have possible serous effusion after first "dive".  She was able to depressurize her left ear but not her right during her descent at her HBO treatment.  Afterwards, the nurse noted erythema and effusion in the right ME with trace bloody effusion.   She feels it has improved since a few days ago, but she still cannot get the ear to pop.   She has not been using nasacort due to eye side effects.      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    Thyroid nodule 08/22/2013    Hyperlipidemia 08/22/2013    Mild intermittent asthma without complication 11/04/2015    Rheumatoid arthritis involving multiple sites 11/16/2016    Iron deficiency anemia due to chronic blood loss 11/14/2017    Sjogren's syndrome 05/21/2018    Candidal esophagitis 10/15/2018    Coronary artery disease involving native coronary artery of native heart without angina pectoris 07/16/2019    Subclinical hyperthyroidism 02/21/2020    Dry eyes     Ureterocele     Spinal stenosis, lumbar region without neurogenic claudication 07/23/2021    History of methotrexate therapy 01/19/2022    NAFLD (nonalcoholic fatty liver disease) 12/29/2022    Chronic pain 12/29/2022    Hypokalemia 06/24/2023    Arthrodesis status 06/29/2023    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 01/11/2024    Nausea 04/15/2024    Acute deep vein thrombosis (DVT) of " right peroneal vein 07/22/2024     Resolved Ambulatory Problems     Diagnosis Date Noted    Osteoarthritis involving multiple joints on both sides of body 08/01/2012    Coronary artery disease 02/06/2013    Posterior tibial tendinitis of left leg 03/08/2013    Lumbar spondylosis 03/08/2013    CS (cervical spondylosis) 03/08/2013    Osteoarthritis of both knees 03/08/2013    Occipital neuralgia 08/13/2014    Osteoarthritis of CMC joint of thumb 11/13/2014    URTI (acute upper respiratory infection) 07/28/2015    Umbilical hernia 08/13/2015    CMC (carpometacarpal) synovitis 10/19/2015    AR (allergic rhinitis) 11/04/2015    Iron deficiency anemia 11/11/2015    Vomiting 11/23/2015    Viral gastroenteritis 11/23/2015    Sepsis 11/23/2015    SIRS due to infectious process with organ dysfunction 11/23/2015    Intractable vomiting with nausea 11/23/2015    Volume depletion, gastrointestinal loss 11/23/2015    Immunosuppressed status 03/03/2016    Lateral meniscus derangement 04/06/2016    Lateral epicondylitis of right elbow 08/10/2016    Pleurisy 11/01/2016    Thoracic back pain 02/07/2017    Low back pain 02/07/2017    Carpal tunnel syndrome of right wrist 04/04/2017    Elevated parathyroid hormone 05/10/2017    Low TSH level 05/10/2017    Hypogammaglobulinemia 05/10/2017    Right carpal tunnel syndrome 05/29/2017    Iron deficiency anemia due to chronic blood loss 06/29/2017    Pain in right hand 06/30/2017    Pain in right elbow 06/30/2017    Range of motion deficit 06/30/2017    Decreased  strength of right hand 06/30/2017    Right shoulder pain 08/21/2017    Pure hypercholesterolemia 01/08/2018    Research study patient 04/30/2018    Left elbow pain 06/20/2018    Rupture of left triceps tendon 10/17/2018    Pain in left elbow 11/01/2018    Stiffness of left elbow joint 01/10/2019    Weakness of left arm 01/10/2019    Left hip pain 03/06/2019    Acute hip pain, left 03/20/2019    Gait abnormality 03/20/2019     Greater trochanteric bursitis of left hip 05/13/2019    Stress fracture of neck of left femur 05/20/2019    Left leg weakness 06/06/2019    Impaired functional mobility, balance, gait, and endurance 06/13/2019    Buttock pain 09/27/2019    Difficulty walking 12/05/2019    ROBERT (iron deficiency anemia) 01/24/2020    Fibromyalgia 03/12/2020    Recurrent UTI     Atrophic vaginitis     Drug-induced constipation     Dry mouth     Nocturia     Urinary frequency     Urge urinary incontinence     RADHA (stress urinary incontinence, female)     Elevated serum GGT level 03/17/2021    History of long term use of Methotrexate  03/17/2021    Transaminasemia 03/17/2021    Chronic pain 03/29/2021    Acute pain of left shoulder 04/26/2021    Osteoarthritis of lumbar spine 08/23/2021    Decreased range of motion with decreased strength 10/05/2021    Impaired mobility and activities of daily living 10/05/2021    Decreased strength of lower extremity 10/08/2021    Healthcare maintenance 01/18/2022    Abnormal finding of blood chemistry, unspecified  01/19/2022    Stress fracture of left foot 06/28/2022    Primary osteoarthritis, left ankle and foot 06/28/2022    Pes planovalgus, acquired, left 06/28/2022    Degenerative joint disease of hindfoot, left 06/28/2022    Decreased strength, endurance, and mobility 07/05/2022    Impaired tolerance of activity 07/05/2022    Current chronic use of systemic steroids 12/29/2022    Hormone replacement therapy (HRT) 12/29/2022    Edema 12/29/2022    Leukopenia 12/29/2022    History of COVID-19 12/29/2022    Neuropathic pain of left foot 02/03/2023    Impaired gait and mobility 03/01/2023    Lumbar stenosis 06/23/2023    Lower extremity edema 06/23/2023    Cutaneous abscess of right knee 06/27/2023    Long term (current) use of inhaled steroids 06/29/2023    History of falling 06/29/2023    Encounter for other orthopedic aftercare 06/29/2023    Normocytic anemia 06/29/2023    Age-related osteoporosis  without current pathological fracture 06/29/2023    Long-term use of high-risk medication 11/07/2023    S/P lumbar fusion 07/31/2024     Past Medical History:   Diagnosis Date    Acid reflux     Allergy     Anemia     Asthma     Degenerative disc disease     Lobular carcinoma in situ     Osteoarthritis     Osteoporosis     Rheumatoid arthritis(714.0)        Review of Systems  General: negative for chills, fever or weight loss  Psychological: negative for mood changes or depression  Ophthalmic: negative for blurry vision, photophobia or eye pain  ENT: see HPI  Respiratory: no cough, shortness of breath, or wheezing  Cardiovascular: no chest pain or dyspnea on exertion  Gastrointestinal: no abdominal pain, change in bowel habits, or black/ bloody stools  Musculoskeletal: negative for gait disturbance or muscular weakness  Neurological: no syncope or seizures; no ataxia  Dermatological: negative for pruritis, rash and jaundice  Hematologic/lymphatic: no easy bruising, no new adenopathy    Physical Exam     Vitals:    03/20/25 1129   BP: 109/74   Pulse: 76           Constitutional:   She is oriented to person, place, and time. Vital signs are normal. She appears well-developed and well-nourished. She appears alert. She is cooperative. Normal speech.      Head:  Normocephalic and atraumatic. Salivary glands normal.  Facial strength is normal.      Ears:    Right Ear: No tenderness (mild tenderness in lateral portion EAC near where crusted area located). Tympanic membrane is injected and retracted. Tympanic membrane is not perforated and not erythematous. Tympanic membrane mobility is abnormal (does not move with otologic valsalva). A middle ear effusion (scant, bloody) is present.   Left Ear: No drainage. Tympanic membrane is not perforated, not erythematous and not retracted.  No middle ear effusion.   Ears:      Nose:  Mucosal edema and rhinorrhea present. No septal deviation or polyps. Turbinates abnormal and  turbinate hypertrophy (3+, boggy, congested mucosa).  Right sinus exhibits no maxillary sinus tenderness and no frontal sinus tenderness. Left sinus exhibits no maxillary sinus tenderness and no frontal sinus tenderness.     Mouth/Throat  Oropharynx clear and moist without lesions or asymmetry, lips, teeth, and gums normal and oropharynx normal. No mucous membrane lesions. No oropharyngeal exudate, posterior oropharyngeal edema or posterior oropharyngeal erythema. Mirror exam not performed due to patient tolerance.  Mirror exam not performed due to patient tolerance.      Neck:  Neck normal without thyromegaly masses, asymmetry, normal tracheal structure, crepitus, and tenderness, thyroid normal, trachea normal, phonation normal, full range of motion with neck supple and no adenopathy. No JVD present. Carotid bruit is not present. No thyroid mass and no thyromegaly present.     She has no cervical adenopathy.     Cardiovascular:    Normal rate, regular rhythm and rate and rhythm, heart sounds, and pulses normal.              Pulmonary/Chest:   Effort and breath sounds normal.     Psychiatric:   She has a normal mood and affect. Her speech is normal and behavior is normal.     Neurological:   She is alert and oriented to person, place, and time. She has neurological normal, alert and oriented. No cranial nerve deficit.     Skin:   No abrasions, lacerations, lesions, or rashes.         Audiogram: Interpreted by me and reviewed with the patient today.  SNHL stable.  Tymps type A today.             Assessment/Plan:      ICD-10-CM ICD-9-CM    1. Otitic barotrauma, initial encounter  T70.0XXA 993.0      E902.9       2. Chronic rhinitis  J31.0 472.0       3. Lesion of external ear canal, right  H61.91 380.9       4. Dysfunction of both eustachian tubes  H69.93 381.81       5. Non-recurrent acute serous otitis media of right ear  H65.01 381.01           She will restart using nasacort as often as able, given the eye side  effects.  Short steroid taper to help resolve serosanguinous effusion.   Plan for PET placement on right next week, then patient can resume HBO.        Love Whaley MD  [facility]

## 2025-03-20 NOTE — PATIENT INSTRUCTIONS
Start steroid taper and use nasacort daily.     Will plan on Right PET placement next week after some resolution of the acute inflammation.

## 2025-03-20 NOTE — PROGRESS NOTES
Joyce Peterson, a 64 y.o. female, was seen today in the clinic for an audiologic evaluation.  Patients main complaint was a clogged feeling in the Right ear with possible fluid following a hyperbaric treatment this past Monday.  She reports decreased hearing in the right ear.      Audiogram results revealed a mild to severe hearing loss from 6k-8k Hz in the left ear and a moderate to severe hearing loss from 6k-8k Hz in the right ear.  Speech reception thresholds were noted at 15 dB in the right ear and 10 dB in the left ear.  Speech discrimination scores were 100% in the right ear and 100% in the left ear.  Tympanometry revealed Type A in the right ear and Type As in the left ear with positive pressure noted bilaterally. The results of the audiogram were reviewed with the patient.    Recommendations:  Otologic evaluation  Annual audiogram  Hearing protection when in noise

## 2025-03-21 ENCOUNTER — PATIENT MESSAGE (OUTPATIENT)
Dept: PAIN MEDICINE | Facility: CLINIC | Age: 65
End: 2025-03-21
Payer: MEDICARE

## 2025-03-21 DIAGNOSIS — G89.4 CHRONIC PAIN DISORDER: ICD-10-CM

## 2025-03-24 ENCOUNTER — OFFICE VISIT (OUTPATIENT)
Dept: OTOLARYNGOLOGY | Facility: CLINIC | Age: 65
End: 2025-03-24
Payer: MEDICARE

## 2025-03-24 VITALS
SYSTOLIC BLOOD PRESSURE: 126 MMHG | HEART RATE: 88 BPM | BODY MASS INDEX: 27.85 KG/M2 | DIASTOLIC BLOOD PRESSURE: 77 MMHG | WEIGHT: 147.38 LBS

## 2025-03-24 DIAGNOSIS — J31.0 CHRONIC RHINITIS: Chronic | ICD-10-CM

## 2025-03-24 DIAGNOSIS — H61.91 LESION OF EXTERNAL EAR CANAL, RIGHT: ICD-10-CM

## 2025-03-24 DIAGNOSIS — H69.93 DYSFUNCTION OF BOTH EUSTACHIAN TUBES: Primary | Chronic | ICD-10-CM

## 2025-03-24 DIAGNOSIS — T70.0XXD OTITIC BAROTRAUMA, SUBSEQUENT ENCOUNTER: ICD-10-CM

## 2025-03-24 PROCEDURE — 3074F SYST BP LT 130 MM HG: CPT | Mod: CPTII,S$GLB,, | Performed by: OTOLARYNGOLOGY

## 2025-03-24 PROCEDURE — 99214 OFFICE O/P EST MOD 30 MIN: CPT | Mod: 25,S$GLB,, | Performed by: OTOLARYNGOLOGY

## 2025-03-24 PROCEDURE — 1159F MED LIST DOCD IN RCRD: CPT | Mod: CPTII,S$GLB,, | Performed by: OTOLARYNGOLOGY

## 2025-03-24 PROCEDURE — 99999 PR PBB SHADOW E&M-EST. PATIENT-LVL IV: CPT | Mod: PBBFAC,,, | Performed by: OTOLARYNGOLOGY

## 2025-03-24 PROCEDURE — 69433 CREATE EARDRUM OPENING: CPT | Mod: RT,S$GLB,, | Performed by: OTOLARYNGOLOGY

## 2025-03-24 PROCEDURE — 3078F DIAST BP <80 MM HG: CPT | Mod: CPTII,S$GLB,, | Performed by: OTOLARYNGOLOGY

## 2025-03-24 PROCEDURE — 1160F RVW MEDS BY RX/DR IN RCRD: CPT | Mod: CPTII,S$GLB,, | Performed by: OTOLARYNGOLOGY

## 2025-03-24 PROCEDURE — 3008F BODY MASS INDEX DOCD: CPT | Mod: CPTII,S$GLB,, | Performed by: OTOLARYNGOLOGY

## 2025-03-24 RX ORDER — OFLOXACIN 3 MG/ML
3 SOLUTION AURICULAR (OTIC) 2 TIMES DAILY
Qty: 10 ML | Refills: 0 | Status: SHIPPED | OUTPATIENT
Start: 2025-03-24 | End: 2025-04-18

## 2025-03-24 NOTE — PATIENT INSTRUCTIONS
Use floxin drops in right ear for next 7 days.    Follow up in 10 days to check tube.     Ok to resume HBO treatments.

## 2025-03-24 NOTE — PROGRESS NOTES
IN OFFICE PROCEDURE:    Pre-Procedure Diagnosis:  Eustachian tube dysfunction, otic barotrauma to the right, need for PE tube for hyperbaric oxygen treatment    Post-Procedure Diagnosis:  Same    Procedure:  Myringotomy with tympanostomy tube - right ear      After written consent was obtained, the procedure was performed under binocular otomicroscopy with sterile technique.  A small amount of phenol was applied to the anterior inferior quadrant of the patient's tympanic membrane.  Using a myringotomy blade, a radial myringotomy was made in the area of anesthetized tympanic membrane.  A #5 pillai tip suction was used to evacuate a clear middle ear effusion.  An darden beveled grommet tube was then placed into the myringotomy site without difficulty.  Ciprodex drops were placed in the patient's right ear canal.  The patient tolerated the procedure well and there were no significant complications.    Love Whaley MD  [facility]    This note was created by combination of typed  and MModal dictation.  Transcription errors may be present.  If there are any questions, please contact me.

## 2025-03-24 NOTE — PROGRESS NOTES
Chief Complaint   Patient presents with    Follow-up     No improvement since last visit       HPI:  Patient is a 64 y.o. female who has previously seen me for chronic rhinitis, chronic otitis externa, right ear canal lesion, previous sialoadenitis.  At last visit she was treated with Ciprodex otic drops for right ear canal inflammation, and mupirocin for left nasal vestibule inflammation and small lesion.  She presents today for follow-up to re-examine these areas and assess response to therapy.    Since the last visit, the patient reports the nasal crusting and irritation has improved though does occasionally flare up.  She used Ciprodex drops on the right ear for short time, but they were causing pain with use so she stopped.  She has not noticed any otorrhea or other ongoing issues with the right ear, though she occasionally has mild discomfort.  She does complain of purulent nasal secretions and thick postnasal drip over the last few weeks.  She has some sinus pressure and ear pressure as well.  She does report a history of allergic rhinitis symptoms in the past and has had some occasional sinusitis when allergies flare.      Interval HPI 11/26/2024 :      Patient reports she has not had any significant otorrhea from the right.  She still continues to use Q-tips in the ears and does notice some scant blood in the ear when she does this.  Previous cultures from last visit did not grow any bacteria or fungus.    She also reports mild increase in her rhinitis symptoms over the last few days.  She uses flonase and azelastine occasionally, and she uses zyrtec, but antihistamines cause significant drying of her eyes.    Interval HPI 02/13/2025 :      Patient was using [employer] eardrops and felt the issue was improving so she had stopped use recently.  She does not report any changes or worsening of the right ear symptoms.  She has had an increase in her allergy/rhinitis symptoms over the last day.       "  Component 2 mo ago   Aerobic Bacterial Culture  Abnormal   STAPHYLOCOCCUS AUREUS  Many    Resulting Agency [facility]        Susceptibility     Staphylococcus aureus     CULTURE, AEROBIC  (SPECIFY SOURCE)     Clindamycin <=0.5 mcg/mL Resistant     Erythromycin >4 mcg/mL Resistant     Oxacillin <=0.25 mcg/mL Sensitive     Tetracycline <=4 mcg/mL Sensitive     Trimeth/Sulfa <=0.5/9.5 m... Sensitive                   Interval HPI 03/20/2025 :      Patient started hyperbaric O2 treatments and noted to have possible serous effusion after first "dive".  She was able to depressurize her left ear but not her right during her descent at her HBO treatment.  Afterwards, the nurse noted erythema and effusion in the right ME with trace bloody effusion.   She feels it has improved since a few days ago, but she still cannot get the ear to pop.   She has not been using nasacort due to eye side effects.    Interval HPI 03/24/2025 :    Took steroids, no significant improvement in symptoms.  Here for PET placement for HBO.  Right ear still feels clogged.  No pain.    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    Thyroid nodule 08/22/2013    Hyperlipidemia 08/22/2013    Mild intermittent asthma without complication 11/04/2015    Rheumatoid arthritis involving multiple sites 11/16/2016    Iron deficiency anemia due to chronic blood loss 11/14/2017    Sjogren's syndrome 05/21/2018    Candidal esophagitis 10/15/2018    Coronary artery disease involving native coronary artery of native heart without angina pectoris 07/16/2019    Subclinical hyperthyroidism 02/21/2020    Dry eyes     Ureterocele     Spinal stenosis, lumbar region without neurogenic claudication 07/23/2021    History of methotrexate therapy 01/19/2022    NAFLD (nonalcoholic fatty liver disease) 12/29/2022    Chronic pain 12/29/2022    Hypokalemia " 06/24/2023    Arthrodesis status 06/29/2023    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 01/11/2024    Nausea 04/15/2024    Acute deep vein thrombosis (DVT) of right peroneal vein 07/22/2024     Resolved Ambulatory Problems     Diagnosis Date Noted    Osteoarthritis involving multiple joints on both sides of body 08/01/2012    Coronary artery disease 02/06/2013    Posterior tibial tendinitis of left leg 03/08/2013    Lumbar spondylosis 03/08/2013    CS (cervical spondylosis) 03/08/2013    Osteoarthritis of both knees 03/08/2013    Occipital neuralgia 08/13/2014    Osteoarthritis of CMC joint of thumb 11/13/2014    URTI (acute upper respiratory infection) 07/28/2015    Umbilical hernia 08/13/2015    CMC (carpometacarpal) synovitis 10/19/2015    AR (allergic rhinitis) 11/04/2015    Iron deficiency anemia 11/11/2015    Vomiting 11/23/2015    Viral gastroenteritis 11/23/2015    Sepsis 11/23/2015    SIRS due to infectious process with organ dysfunction 11/23/2015    Intractable vomiting with nausea 11/23/2015    Volume depletion, gastrointestinal loss 11/23/2015    Immunosuppressed status 03/03/2016    Lateral meniscus derangement 04/06/2016    Lateral epicondylitis of right elbow 08/10/2016    Pleurisy 11/01/2016    Thoracic back pain 02/07/2017    Low back pain 02/07/2017    Carpal tunnel syndrome of right wrist 04/04/2017    Elevated parathyroid hormone 05/10/2017    Low TSH level 05/10/2017    Hypogammaglobulinemia 05/10/2017    Right carpal tunnel syndrome 05/29/2017    Iron deficiency anemia due to chronic blood loss 06/29/2017    Pain in right hand 06/30/2017    Pain in right elbow 06/30/2017    Range of motion deficit 06/30/2017    Decreased  strength of right hand 06/30/2017    Right shoulder pain 08/21/2017    Pure hypercholesterolemia 01/08/2018    Research study patient 04/30/2018    Left elbow pain 06/20/2018    Rupture of left triceps tendon 10/17/2018    Pain in left elbow 11/01/2018    Stiffness of left elbow  joint 01/10/2019    Weakness of left arm 01/10/2019    Left hip pain 03/06/2019    Acute hip pain, left 03/20/2019    Gait abnormality 03/20/2019    Greater trochanteric bursitis of left hip 05/13/2019    Stress fracture of neck of left femur 05/20/2019    Left leg weakness 06/06/2019    Impaired functional mobility, balance, gait, and endurance 06/13/2019    Buttock pain 09/27/2019    Difficulty walking 12/05/2019    ROBERT (iron deficiency anemia) 01/24/2020    Fibromyalgia 03/12/2020    Recurrent UTI     Atrophic vaginitis     Drug-induced constipation     Dry mouth     Nocturia     Urinary frequency     Urge urinary incontinence     RADHA (stress urinary incontinence, female)     Elevated serum GGT level 03/17/2021    History of long term use of Methotrexate  03/17/2021    Transaminasemia 03/17/2021    Chronic pain 03/29/2021    Acute pain of left shoulder 04/26/2021    Osteoarthritis of lumbar spine 08/23/2021    Decreased range of motion with decreased strength 10/05/2021    Impaired mobility and activities of daily living 10/05/2021    Decreased strength of lower extremity 10/08/2021    Healthcare maintenance 01/18/2022    Abnormal finding of blood chemistry, unspecified  01/19/2022    Stress fracture of left foot 06/28/2022    Primary osteoarthritis, left ankle and foot 06/28/2022    Pes planovalgus, acquired, left 06/28/2022    Degenerative joint disease of hindfoot, left 06/28/2022    Decreased strength, endurance, and mobility 07/05/2022    Impaired tolerance of activity 07/05/2022    Current chronic use of systemic steroids 12/29/2022    Hormone replacement therapy (HRT) 12/29/2022    Edema 12/29/2022    Leukopenia 12/29/2022    History of COVID-19 12/29/2022    Neuropathic pain of left foot 02/03/2023    Impaired gait and mobility 03/01/2023    Lumbar stenosis 06/23/2023    Lower extremity edema 06/23/2023    Cutaneous abscess of right knee 06/27/2023    Long term (current) use of inhaled steroids 06/29/2023     History of falling 06/29/2023    Encounter for other orthopedic aftercare 06/29/2023    Normocytic anemia 06/29/2023    Age-related osteoporosis without current pathological fracture 06/29/2023    Long-term use of high-risk medication 11/07/2023    S/P lumbar fusion 07/31/2024     Past Medical History:   Diagnosis Date    Acid reflux     Allergy     Anemia     Asthma     Degenerative disc disease     Lobular carcinoma in situ     Osteoarthritis     Osteoporosis     Rheumatoid arthritis(714.0)        Review of Systems  General: negative for chills, fever or weight loss  Psychological: negative for mood changes or depression  Ophthalmic: negative for blurry vision, photophobia or eye pain  ENT: see HPI  Respiratory: no cough, shortness of breath, or wheezing  Cardiovascular: no chest pain or dyspnea on exertion  Gastrointestinal: no abdominal pain, change in bowel habits, or black/ bloody stools  Musculoskeletal: negative for gait disturbance or muscular weakness  Neurological: no syncope or seizures; no ataxia  Dermatological: negative for pruritis, rash and jaundice  Hematologic/lymphatic: no easy bruising, no new adenopathy    Physical Exam     Vitals:    03/24/25 1113   BP: 126/77   Pulse: 88           Constitutional:   She is oriented to person, place, and time. Vital signs are normal. She appears well-developed and well-nourished. She appears alert. She is cooperative. Normal speech.      Head:  Normocephalic and atraumatic. Salivary glands normal.  Facial strength is normal.      Ears:    Right Ear: No tenderness (mild tenderness in lateral portion EAC near where crusted area located). Tympanic membrane is injected. Tympanic membrane is not perforated, not erythematous and not retracted. Tympanic membrane mobility is abnormal (does not move with otologic valsalva). No middle ear effusion (scant, bloody).   Left Ear: No drainage. Tympanic membrane is not perforated, not erythematous and not retracted.  No  middle ear effusion.   Ears:      Nose:  Mucosal edema and rhinorrhea present. No septal deviation or polyps. Turbinates abnormal and turbinate hypertrophy (3+, boggy, congested mucosa).  Right sinus exhibits no maxillary sinus tenderness and no frontal sinus tenderness. Left sinus exhibits no maxillary sinus tenderness and no frontal sinus tenderness.     Mouth/Throat  Oropharynx clear and moist without lesions or asymmetry, lips, teeth, and gums normal and oropharynx normal. No mucous membrane lesions. No oropharyngeal exudate, posterior oropharyngeal edema or posterior oropharyngeal erythema. Mirror exam not performed due to patient tolerance.  Mirror exam not performed due to patient tolerance.      Neck:  Neck normal without thyromegaly masses, asymmetry, normal tracheal structure, crepitus, and tenderness, thyroid normal, trachea normal, phonation normal, full range of motion with neck supple and no adenopathy. No JVD present. Carotid bruit is not present. No thyroid mass and no thyromegaly present.     She has no cervical adenopathy.     Cardiovascular:    Normal rate, regular rhythm and rate and rhythm, heart sounds, and pulses normal.              Pulmonary/Chest:   Effort and breath sounds normal.     Psychiatric:   She has a normal mood and affect. Her speech is normal and behavior is normal.     Neurological:   She is alert and oriented to person, place, and time. She has neurological normal, alert and oriented. No cranial nerve deficit.     Skin:   No abrasions, lacerations, lesions, or rashes.         Previous Audiogram: Interpreted by me and reviewed with the patient today.  SNHL stable.  Tymps type A today.         See separate procedure note for right myringotomy with PE tube placement.    Assessment/Plan:      ICD-10-CM ICD-9-CM    1. Dysfunction of both eustachian tubes  H69.93 381.81       2. Chronic rhinitis  J31.0 472.0       3. Lesion of external ear canal, right  H61.91 380.9       4. Otitic  barotrauma, subsequent encounter  T70.0XXD V58.89      993.0             PE tube placed today and right ear without problem.    Use Floxin otic drops b.i.d. for the next 10 days.    Follow-up with me in 1-2 weeks.  Okay to resume hyperbaric oxygen treatments.      Love Whaley MD  [facility]

## 2025-03-25 ENCOUNTER — TELEPHONE (OUTPATIENT)
Dept: PAIN MEDICINE | Facility: CLINIC | Age: 65
End: 2025-03-25
Payer: MEDICARE

## 2025-03-25 ENCOUNTER — TELEPHONE (OUTPATIENT)
Dept: SPORTS MEDICINE | Facility: CLINIC | Age: 65
End: 2025-03-25
Payer: MEDICARE

## 2025-03-25 NOTE — TELEPHONE ENCOUNTER
Staff spoke with patient and got her rescheduled from 4/15 to 4/16 at 2:30. Patient is in agreement to the above and verbalized understanding.

## 2025-03-25 NOTE — TELEPHONE ENCOUNTER
----- Message from Monica sent at 3/25/2025  2:49 PM CDT -----  Contact: Joyce  TYPE: Patient Call BackWho called:PatientWhat is the request in detail:  patient called to cancel appointment for 03/27/2025 patient states that she started hyper baric treatment so she can't make appt Please give call back Can the clinic reply by [facility]?   Would the patient rather a call back or a response via [facility] call back number:.[phone] (home)

## 2025-03-25 NOTE — TELEPHONE ENCOUNTER
----- Message from Freya sent at 3/25/2025  3:01 PM CDT -----  Regarding: APPT  Name of Who is Calling:Pt What is the request in detail: Requesting later time for appt on 4-15 after 1 preferably Can the clinic reply by [facility]: yes What Number to Call Back [facility]:Telephone Information:Mobile          [phone]\

## 2025-03-25 NOTE — TELEPHONE ENCOUNTER
Spoke with pt, states she is getting a lumbar spine MRI with her back surgeon and would like to wait on results prior to scheduling an appt with Dr. Small for her hip. Advised pt if MRI is unremarkable and spine surgeon does not feel pain is coming from her spine the next step would be to establish with Dr. Small for the hip. Pt states understanding and appreciation.     Lisa Christopher, MS, ATC, OTC  Clinical Assistant to Dr. Candida Small

## 2025-03-26 ENCOUNTER — TELEPHONE (OUTPATIENT)
Dept: GASTROENTEROLOGY | Facility: CLINIC | Age: 65
End: 2025-03-26
Payer: MEDICARE

## 2025-03-26 NOTE — TELEPHONE ENCOUNTER
----- Message from Med Assistant Ivan sent at 3/26/2025 10:20 AM CDT -----  Regarding: RE: Cisapride  This is fine.  ----- Message -----  From: Linda Boone MA  Sent: 3/25/2025   9:11 AM CDT  To: Ivan Flores MA  Subject: RE: Cisapride                                    Hi, we are limited in time with scheduling an appointment for Mrs. Peterson.   I saw on your calendar you are not available between 11-4 on 4/8.    can work her in at 12:30 on that day.  Do you have to be there?  Can you just give him the paperwork prior?   Delilah  ----- Message -----  From: Ivan Flores MA  Sent: 3/19/2025   1:12 PM CDT  To: Linda Boone MA  Subject: RE: Cisapride                                    Thank you  ----- Message -----  From: Linda Boone MA  Sent: 3/19/2025   9:46 AM CDT  To: Ivan Flores MA  Subject: Cisapride                                        Please disregard the previous message.  She cannot come that day.  She is now doing hyperbariics every day starting at 8:00 and does not finish until 11:30.   In the afternoon she has to be home to  her grandson by 2:30.  This is not leaving us much time to see her.   I will have to speak with  and get back with you.   Delilah  
See note below.   Delilah  
EGD and colonoscopy

## 2025-03-27 ENCOUNTER — PATIENT MESSAGE (OUTPATIENT)
Dept: OTOLARYNGOLOGY | Facility: CLINIC | Age: 65
End: 2025-03-27
Payer: MEDICARE

## 2025-03-27 ENCOUNTER — PATIENT MESSAGE (OUTPATIENT)
Dept: RHEUMATOLOGY | Facility: CLINIC | Age: 65
End: 2025-03-27
Payer: MEDICARE

## 2025-03-27 NOTE — TELEPHONE ENCOUNTER
Returned call. Pt stated that she a scheduling conflict with other apts. Apt on 4/8 was rescheduled to 4/14. Informed I would send her message to the doctor for review. Pt thanked me and verbalized understanding.

## 2025-03-27 NOTE — TELEPHONE ENCOUNTER
Given the HBO treatments, it's not unusual that you'd have the bloody/serous drainage, since that was behind the eardrum before placement of the PE tube.    I would continue to use the drops given at time of PE tube placement twice daily.   If the drainage persists beyond 10 days post tube placement, then let me know, and we can see her sooner than when she is scheduled.    The ear may still be feeling stopped up if there is fluid that's draining from the tube, but if that is not improving as she continues to use the ear drops, then let me know and we can see her sooner to assess that as well.     Love Whaley MD  [facility]    This note was created by combination of typed  and MModal dictation.  Transcription errors may be present.  If there are any questions, please contact me.

## 2025-04-01 DIAGNOSIS — G57.03 PIRIFORMIS SYNDROME OF BOTH SIDES: ICD-10-CM

## 2025-04-01 DIAGNOSIS — M79.18 MYOFASCIAL PAIN: ICD-10-CM

## 2025-04-02 ENCOUNTER — PATIENT MESSAGE (OUTPATIENT)
Dept: OTOLARYNGOLOGY | Facility: CLINIC | Age: 65
End: 2025-04-02
Payer: MEDICARE

## 2025-04-03 DIAGNOSIS — M19.90 OSTEOARTHRITIS, UNSPECIFIED OSTEOARTHRITIS TYPE, UNSPECIFIED SITE: ICD-10-CM

## 2025-04-03 RX ORDER — NAPROXEN 500 MG/1
500 TABLET ORAL 2 TIMES DAILY PRN
Qty: 180 TABLET | Refills: 0 | Status: SHIPPED | OUTPATIENT
Start: 2025-04-03

## 2025-04-03 RX ORDER — TIZANIDINE 4 MG/1
4 TABLET ORAL EVERY 6 HOURS PRN
Qty: 120 TABLET | Refills: 1 | Status: SHIPPED | OUTPATIENT
Start: 2025-04-03

## 2025-04-03 NOTE — TELEPHONE ENCOUNTER
Ultimately I am going to have to examine the ear to be sure, but some of what you were sensing may not be from the tube or the status of the middle ear, but could just be your Eustachian tube acclimated to the fact that the tube is in place.  It can cause some change in the sensation.    If it is no longer draining or hurting, those are good signs, but let me take a look at it at your postop and then I will have a better sense of what is going on.    Love Whaley MD  [facility]    This note was created by combination of typed  and MModal dictation.  Transcription errors may be present.  If there are any questions, please contact me.

## 2025-04-07 ENCOUNTER — PATIENT MESSAGE (OUTPATIENT)
Dept: PAIN MEDICINE | Facility: CLINIC | Age: 65
End: 2025-04-07
Payer: MEDICARE

## 2025-04-08 ENCOUNTER — PATIENT MESSAGE (OUTPATIENT)
Dept: PAIN MEDICINE | Facility: CLINIC | Age: 65
End: 2025-04-08
Payer: MEDICARE

## 2025-04-08 ENCOUNTER — OFFICE VISIT (OUTPATIENT)
Dept: GASTROENTEROLOGY | Facility: CLINIC | Age: 65
End: 2025-04-08
Payer: MEDICARE

## 2025-04-08 VITALS
SYSTOLIC BLOOD PRESSURE: 142 MMHG | HEIGHT: 61 IN | BODY MASS INDEX: 28.39 KG/M2 | WEIGHT: 150.38 LBS | DIASTOLIC BLOOD PRESSURE: 79 MMHG | HEART RATE: 93 BPM

## 2025-04-08 DIAGNOSIS — K31.84 GASTROPARESIS: Primary | ICD-10-CM

## 2025-04-08 DIAGNOSIS — K21.9 GASTROESOPHAGEAL REFLUX DISEASE WITHOUT ESOPHAGITIS: ICD-10-CM

## 2025-04-08 DIAGNOSIS — Z00.6 RESEARCH STUDY PATIENT: ICD-10-CM

## 2025-04-08 DIAGNOSIS — D50.0 IRON DEFICIENCY ANEMIA DUE TO CHRONIC BLOOD LOSS: ICD-10-CM

## 2025-04-08 PROCEDURE — 1159F MED LIST DOCD IN RCRD: CPT | Mod: CPTII,S$GLB,, | Performed by: INTERNAL MEDICINE

## 2025-04-08 PROCEDURE — 99214 OFFICE O/P EST MOD 30 MIN: CPT | Mod: S$GLB,,, | Performed by: INTERNAL MEDICINE

## 2025-04-08 PROCEDURE — 3008F BODY MASS INDEX DOCD: CPT | Mod: CPTII,S$GLB,, | Performed by: INTERNAL MEDICINE

## 2025-04-08 PROCEDURE — 3077F SYST BP >= 140 MM HG: CPT | Mod: CPTII,S$GLB,, | Performed by: INTERNAL MEDICINE

## 2025-04-08 PROCEDURE — 1160F RVW MEDS BY RX/DR IN RCRD: CPT | Mod: CPTII,S$GLB,, | Performed by: INTERNAL MEDICINE

## 2025-04-08 PROCEDURE — 3078F DIAST BP <80 MM HG: CPT | Mod: CPTII,S$GLB,, | Performed by: INTERNAL MEDICINE

## 2025-04-08 PROCEDURE — 99999 PR PBB SHADOW E&M-EST. PATIENT-LVL IV: CPT | Mod: PBBFAC,,, | Performed by: INTERNAL MEDICINE

## 2025-04-08 RX ORDER — PNV NO.95/FERROUS FUM/FOLIC AC 28MG-0.8MG
1000 TABLET ORAL
COMMUNITY

## 2025-04-08 NOTE — PROGRESS NOTES
Subjective:       Patient ID: Joyce Peterson is a 64 y.o. female.    Chief Complaint: Follow-up (Gastroparesis)    Follow-up    64-year-old lady.  Longstanding idiopathic gastroparesis.  For probably 15 years we have been able to obtain propulsid as part of a compassionate drug study through [employer].    Since my last visit she has continued to have problems and difficulties.  During last visit she was having back pain despite the surgery last June.  It continues to be the case.  She has been started on hyperbaric oxygen treatments at [facility] for her osteonecrosis of the jaw.  She is 3 weeks into that daily treatment.  And probably from a GI perspective the most difficult thing, other than requiring pain medicines, or the antibiotics as she has receiving for the osteomyelitis.  They seem to made a big difference in exacerbating her GI symptoms.    She continues to have especially after dinner, fullness and then regurgitation when she lays down.  We increased pantoprazole to twice daily and think that helps with any pyrosis .  But the regurgitation of stomach contents in the evenings it is difficult even despite raising the head of her bed and eating a very restricted early dinner.  She has had difficulty with her bowel movements.  But she has a protocol now with stool softeners and laxatives and then magnesium as a fall back when she is constipated.  No severe abdominal pain.    Past Medical History:   Diagnosis Date    Acid reflux     Allergy     Anemia     Asthma     Coronary artery disease     CS (cervical spondylosis) 03/08/2013    Degenerative disc disease     Degenerative joint disease of hindfoot, left 6/28/2022    Dry eyes     Dry mouth     Gastroparesis     History of methotrexate therapy 01/19/2022    Hyperlipidemia     Lateral meniscus derangement 04/06/2016    Lobular carcinoma in situ     Lumbar spondylosis 03/08/2013    Osteoarthritis     Osteoporosis     Pes planovalgus, acquired, left  "6/28/2022    Rheumatoid arthritis(714.0)     Rupture of left triceps tendon 10/17/2018    Umbilical hernia 08/13/2015       Review of patient's allergies indicates:   Allergen Reactions    Actemra [tocilizumab] Other (See Comments)     Severe dizziness    Codeine Nausea And Vomiting and Hives    Gold au 198 Hives and Rash    Hydroxychloroquine Other (See Comments)     Can't remember the reaction      Iodinated contrast media Other (See Comments)     Other reaction(s): BURNING ALL OVER    Iodine Other (See Comments) and Hives     Other reaction(s): BURNING ALL OVER - Iodine dye - Not topical    Ondansetron Nausea And Vomiting    Sulfa (sulfonamide antibiotics) Other (See Comments)     Can't remember the reaction    Zofran [ondansetron hcl (pf)] Nausea And Vomiting     Pt reports that last time she received zofran she started vomiting again    Methotrexate analogues Nausea Only    Pneumococcal vaccine Other (See Comments)    Pneumovax 23 [pneumococcal 23-argenis ps vaccine] Other (See Comments)     "sick"    Methotrexate Nausea Only        Family History   Problem Relation Name Age of Onset    Cancer Mother 69         Lung Cancer    Emphysema Mother 69     Heart attack Mother 69     Alcohol abuse Mother 69     Cancer Father          Lung Cancer    Osteoarthritis Father      Lung cancer Father      Skin cancer Father      Alcohol abuse Father      Heart disease Brother 57     Heart attack Brother 57     Alcohol abuse Brother 57     Cirrhosis Brother 57     Osteoarthritis Paternal Aunt      Retinal detachment Maternal Aunt      No Known Problems Sister      No Known Problems Maternal Uncle      No Known Problems Paternal Uncle      No Known Problems Maternal Grandmother      No Known Problems Maternal Grandfather      No Known Problems Paternal Grandmother      No Known Problems Paternal Grandfather      Colon cancer Neg Hx      Esophageal cancer Neg Hx      Stomach cancer Neg Hx      Celiac disease Neg Hx      Diabetes Neg " Hx      Thyroid disease Neg Hx      Amblyopia Neg Hx      Blindness Neg Hx      Cataracts Neg Hx      Glaucoma Neg Hx      Hypertension Neg Hx      Macular degeneration Neg Hx      Strabismus Neg Hx      Stroke Neg Hx         Social History[1]     Review of Systems    CMP   Lab Results   Component Value Date     03/24/2025     03/12/2025     01/24/2025    K 4.2 03/24/2025    K 4.8 03/12/2025    K 3.8 01/24/2025     03/24/2025     01/24/2025     01/06/2025    CO2 24 03/24/2025    CO2 26 03/12/2025    CO2 22 (L) 01/24/2025    GLU 83 03/12/2025    GLU 89 01/24/2025     (H) 01/17/2025    BUN 17 03/24/2025    BUN 14 03/12/2025    BUN 14 01/24/2025    CREATININE 0.8 03/24/2025    CREATININE 0.60 03/12/2025    CREATININE 0.7 01/24/2025    CALCIUM 9.4 03/24/2025    CALCIUM 9.7 03/12/2025    CALCIUM 9.5 01/24/2025    PROT 6.4 01/24/2025    PROT 6.1 01/06/2025    PROT 6.4 10/14/2024    ALBUMIN 3.4 (L) 03/24/2025    ALBUMIN 4.0 03/12/2025    ALBUMIN 3.8 01/24/2025    BILITOT 0.2 03/24/2025    BILITOT 0.2 03/12/2025    BILITOT 0.3 01/24/2025    ALKPHOS 166 (H) 03/24/2025    ALKPHOS 175 (H) 03/12/2025    ALKPHOS 129 01/24/2025    AST 22 03/24/2025    AST 19 03/12/2025    AST 27 01/24/2025    ALT 22 03/24/2025    ALT 20 03/12/2025    ALT 28 01/24/2025    ANIONGAP 10 03/24/2025    ANIONGAP 10 01/24/2025    ANIONGAP 7 (L) 01/06/2025    and CBC   Lab Results   Component Value Date    WBC 7.75 03/24/2025    WBC 6.69 03/05/2025    WBC 4.31 01/24/2025    HGB 12.2 03/24/2025    HGB 12.1 03/05/2025    HGB 10.8 (L) 01/24/2025    HCT 38.1 03/24/2025     03/24/2025     03/05/2025     01/24/2025       No results found for this or any previous visit from the past 365 days.             Objective:      Physical Exam  Exam conducted with a chaperone present.   HENT:      Head: Normocephalic and atraumatic.   Cardiovascular:      Rate and Rhythm: Normal rate and regular rhythm.    Pulmonary:      Effort: Pulmonary effort is normal.      Breath sounds: Normal breath sounds.   Abdominal:      General: Abdomen is flat. There is no distension.      Palpations: Abdomen is soft. There is no mass.      Tenderness: There is no abdominal tenderness.   Musculoskeletal:      Cervical back: Normal range of motion and neck supple.   Neurological:      Mental Status: She is alert.         Assessment & Plan:       Gastroparesis    Gastroesophageal reflux disease without esophagitis    Research study patient    Iron deficiency anemia due to chronic blood loss     Assessment  1. Gastroparesis.  We will continue with the propulsive and the small meals and the gastroparesis diet  2. Gastroesophageal reflux.  She did not have esophagitis on the last time we took a look which has been 8 years ago.  I think that more than likely there was no esophagitis as she has been on a PPI all this time.  3. Research study patient.  I will check her labs according to protocol.  There are no new symptoms which would suggest any complication from the propulsive.  I will review the EKG to make sure there has been no prolongation of the QTC.  They are changing the study such that she will be receiving the liquid form rather than the pill form of propulsive which would be certainly less convenient and make it more difficult to travel.  But that is a requirement from the drug sponsor.  4. Iron-deficiency anemia.  She does have a iron-deficiency anemia which goes back many years.  In fact that is the indication we did her EGD and colonoscopy on in 2017..  We never did a video capsule exam but I do not think that would change our recommendations.  If it showed small bowel AVMs I probably would not be recommending deep balloon assisted enteroscopy.  An more than likely it would come back normal.  She is receiving iron infusions which has helped her blood count    This note was created with voice recognition dictation technology.   There may be errors that I did not see, detect or correct.      López Moore MD          [1]   Social History  Tobacco Use    Smoking status: Never    Smokeless tobacco: Never   Substance Use Topics    Alcohol use: Yes     Comment: 2-3 times per year.    Drug use: No

## 2025-04-09 ENCOUNTER — OFFICE VISIT (OUTPATIENT)
Dept: PAIN MEDICINE | Facility: CLINIC | Age: 65
End: 2025-04-09
Attending: ANESTHESIOLOGY
Payer: MEDICARE

## 2025-04-09 ENCOUNTER — PATIENT MESSAGE (OUTPATIENT)
Dept: PAIN MEDICINE | Facility: OTHER | Age: 65
End: 2025-04-09
Payer: MEDICARE

## 2025-04-09 VITALS
BODY MASS INDEX: 27.91 KG/M2 | DIASTOLIC BLOOD PRESSURE: 56 MMHG | HEART RATE: 91 BPM | SYSTOLIC BLOOD PRESSURE: 113 MMHG | TEMPERATURE: 98 F | WEIGHT: 147.69 LBS

## 2025-04-09 DIAGNOSIS — M48.062 SPINAL STENOSIS OF LUMBAR REGION WITH NEUROGENIC CLAUDICATION: ICD-10-CM

## 2025-04-09 DIAGNOSIS — K31.84 GASTROPARESIS: ICD-10-CM

## 2025-04-09 DIAGNOSIS — M54.16 LUMBAR RADICULOPATHY: Primary | ICD-10-CM

## 2025-04-09 DIAGNOSIS — M47.816 LUMBAR SPONDYLOSIS: ICD-10-CM

## 2025-04-09 DIAGNOSIS — M81.8 STEROID-INDUCED OSTEOPOROSIS: Chronic | ICD-10-CM

## 2025-04-09 DIAGNOSIS — Z98.1 S/P LUMBAR SPINAL FUSION: ICD-10-CM

## 2025-04-09 DIAGNOSIS — M51.362 DEGENERATION OF INTERVERTEBRAL DISC OF LUMBAR REGION WITH DISCOGENIC BACK PAIN AND LOWER EXTREMITY PAIN: ICD-10-CM

## 2025-04-09 DIAGNOSIS — Z79.891 ENCOUNTER FOR LONG-TERM OPIATE ANALGESIC USE: ICD-10-CM

## 2025-04-09 DIAGNOSIS — G89.4 CHRONIC PAIN DISORDER: ICD-10-CM

## 2025-04-09 DIAGNOSIS — T38.0X5A STEROID-INDUCED OSTEOPOROSIS: Chronic | ICD-10-CM

## 2025-04-09 DIAGNOSIS — M96.1 POST LAMINECTOMY SYNDROME: ICD-10-CM

## 2025-04-09 DIAGNOSIS — M06.9 RHEUMATOID ARTHRITIS INVOLVING MULTIPLE SITES, UNSPECIFIED WHETHER RHEUMATOID FACTOR PRESENT: Chronic | ICD-10-CM

## 2025-04-09 PROCEDURE — 3078F DIAST BP <80 MM HG: CPT | Mod: CPTII,S$GLB,, | Performed by: ANESTHESIOLOGY

## 2025-04-09 PROCEDURE — G2211 COMPLEX E/M VISIT ADD ON: HCPCS | Mod: S$GLB,,, | Performed by: ANESTHESIOLOGY

## 2025-04-09 PROCEDURE — 99214 OFFICE O/P EST MOD 30 MIN: CPT | Mod: GC,S$GLB,, | Performed by: ANESTHESIOLOGY

## 2025-04-09 PROCEDURE — 1160F RVW MEDS BY RX/DR IN RCRD: CPT | Mod: CPTII,S$GLB,, | Performed by: ANESTHESIOLOGY

## 2025-04-09 PROCEDURE — 3074F SYST BP LT 130 MM HG: CPT | Mod: CPTII,S$GLB,, | Performed by: ANESTHESIOLOGY

## 2025-04-09 PROCEDURE — 3008F BODY MASS INDEX DOCD: CPT | Mod: CPTII,S$GLB,, | Performed by: ANESTHESIOLOGY

## 2025-04-09 PROCEDURE — 1159F MED LIST DOCD IN RCRD: CPT | Mod: CPTII,S$GLB,, | Performed by: ANESTHESIOLOGY

## 2025-04-09 PROCEDURE — 99999 PR PBB SHADOW E&M-EST. PATIENT-LVL V: CPT | Mod: PBBFAC,,, | Performed by: ANESTHESIOLOGY

## 2025-04-09 RX ORDER — MORPHINE SULFATE 15 MG/1
15 TABLET, FILM COATED, EXTENDED RELEASE ORAL 3 TIMES DAILY
Qty: 60 TABLET | Refills: 0 | Status: SHIPPED | OUTPATIENT
Start: 2025-04-09

## 2025-04-09 NOTE — PROGRESS NOTES
Pain Follow up Note- Established Patient         Subjective:      Patient ID: Joyce Peterson is a 64 y.o. female.    Chief Complaint: Low-back Pain    Referred by: No ref. provider found     Interval History 4/9/2025:   Joyce Peterson is following up following up for her chronic lower back pain. Patient said her pain has worsened over the last month without no pain relief with her current pain medication Morphine 15 Q12 hours. The pain is worse with walking and standing for prolonged period of time. She said the pain radiates down both legs along the L3/4 dermatome.She is unable to walk without assistance and feels better with leaning forward for support and pain relief. Patient has lumbar CT and MRI without any acute changes in her hardware. Patient also continues to follow up with ID and Hyperbaric treatment for chronic jaw osteonecrosis. Her pain today is 8/10. She denied any weight loss, night sweats, bowel incontinence or saddle anesthesia. She continues to follow up with NS and Rheumatology.      Interval History 3/11/2025:  The patient is here for follow up of lower back pain. She feels that her back pain has worsened over the past couple of months. She recently had an extended vacation to [place] and [place] and experienced significant back pain. She says that it felt like nerve pain. She had decreased Lyrica from TID to BID at that time because she was running out.  She is frustrated due to continued pain. She did reach out to neurosurgery, Dr. Schusetr. She had a recent lumbar CT and is scheduled for review tomorrow. She takes MS Contin 15 mg Q12 which helps her pain. 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 Her pain today is 5/10.    Interval History 1/6/2025:  The patient returns today for follow up of chronic pain back pain. Since previous visit, she has been weaning down MS Contin. She is now on 15 mg q12h. She tried  skipping a dose yesterday but says that she felt jittery. Otherwise, she has done well with regards to decreasing the medication. She has been more active at home which she is happy about. She still has difficulty with walking and feels unsteady. She did complete PT and plans to join a gym. She is also having more right hip pain recently, mainly at night when she lays on the right side. She says that hip injections in the past were not very helpful. Her pain today is 0/10.    Interval History 10/29/2024:  Mrs. Peterson returns today for follow up of chronic back, hip and knee pain. She was previously changed from oxycodone to long acting morphine by Dr. Mayfield was chronic pain. She is taking MS Contin 30mg Q12. She reports significant benefit of pain with this regimen. With the medication, she has no pain. She would like to start weaning down the medication. It was already sent to the pharmacy for today. She has not been able to be as active as she would like to be since her surgeries. She is able to walk short distances. Her pain today is 0/10.    Interval history: 7/25/24:   Joyce Peterson returns to clinic for opioid management for her chronic low back pain, BL hip and knee pain. At her last visit, oxycodone 10 mg was discontinued and patient was started on 30 mg ER morphine. Patient was instructed to use 10 mg oxycodone for breakthrough pain no more than 3 times daily. Today, she reports that her pain is much improved since the switch in pain medication to morphine. She states that her pain is currently 3/10 in severity with the majority of her pain being in the right knee, localized around the lateral joint line. She is s/p L2-pelvic fusion and states that she was doing home health therapy but is about to start outpatient therapy. She reports that her pain is typically worse at the end of the day with throbbing in the right knee. Of note, she does have DVT present in the RLE. She also uses Tylenol and  Lyrica for pain which helps. She denies any new weakness, paresthesias, changes in bowel or bladder function, or saddle anesthesia.      Interval History 7/17/2024:   Joyce Peterson with a history of RA and lumbar radiculopathy s/p L2-pelvis fusion, L5-S1 TLIF comes to the clinic for opioid management for her for chronic lower back, B/L hips and B/L knee pain. Patient's current opioid management is done by [facility] but they will not prescirbe Oxycodone 10 Q4 hours beyond 6 weeks and recommended follow up with pain medicine. Patient said she gets good pain relief with the Oxycodone 10 Q4 hours. Pain is worse at nights, prolonged walking and sitting. She is also using Voltaren gel, Lyrica and Zanaflex. Will use tylenol PRN for break through pain. The pain is mostly in her hips and knees bilaterally.  Her lower back pain with radiation down her legs have improved after the surgery.  Patient said she got good pain relief and restoration of function with PT but her days were reduced from 3 days to 2 days. She has follow up with [facility] and Rheumatology.     Interval History 6/27/2024:  Joyce Peterson returns to clinic for follow-up for chronic back pain. She is s/p L2-pelvis fusion on 6/12/2024. She has noticed increased pain over the last 3 nights. She had a visit with [facility] on 6/25/2024 and was told this was normal healing and nerve regeneration from the surgery. The patient said [facility] will not prescribe her pain longer than 6 weeks s/p surgery.  Her current regimen from [facility] is oxycodone 10 mg q4 hours, hydromorphone 1 mg for breakthrough pain.  She also takes Lyrica 150 mg TID and Robaxin 750 mg QID.  She is not supposed to take Naproxen s/p surgery, but has taken it a couple times for severe pain.  The patient denies fever/night sweats, urinary incontinence, bowel incontinence, significant weight changes, significant motor weakness or changes, or loss of sensations. Her pain today is 6/10.    Interval History  4/17/2024:   Patient returns to clinic for follow up of low back and hip pain. She was scheduled for SCS trial however trial was not completed due to patient preference. She states she has been seen by neurosugery and orthopedic surgery, for which she plans to undergo back surgery. 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  She reports pain has been worsening  since last visit. She states she continues to take lyrica TID, robaxin 750mg QID, oxycodone 5 q.i.d. p.r.n.  for pain control, she states this has not provided her with relief x the last 3 weeks. She reports she has not been able to participate in HEP due to pain. Pain level at time of exam is reported to be 8.5/10. Patient requests she wants to return to a previous regimen of percocet and dilaudid until she is able to have back procedure. She expresses excruciating pain that runs down bilateral pain with any movement and with coughing.     Interval history  She returns for virtual follow up of back and hip pain. At her last OV, Dr. Mayfield recommended [place] SCS trial. She had her first part of the psych consult and has the second part this afternoon. She has had limited benefit with multiple interventions in the past including back surgery, procedures, PT and medications. She would like to move forward with SCS trial. Her pain today is 10/10.    Interval History 1/16/24:  Joyce Peterson returns for virtual follow up of low back and left hip pain. She is s/p L4/5 TLIF 6/22/23. She is planning for a left hip RFA and SCS trial with us. 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 She has since had an MRI of her L Spine in December that demonstrated a large amount of extruded disc material extending superiorly from L4-5 into the spinal canal with additional grade 2 anterolisthesis at this level contributing to severe spinal canal  stenosis. She reports intermittent weakness in her left > right leg. Patient denies saddle anesthesia, bladder/bowel incontinence, ataxia, or increased falls. Shopping cart sign +. She is able to walk 1 block before needing to stop. Pain improves upon sitting. Pain currently ranges from 5-9/10, currently a 7/10. Pain radiates down her both her legs anterolaterally down to the ankle with additional shin pain. She has been on percocet 5-325 QID PRN and lyrica 150mg TID. She received opioids from Ortho 5/325 in October addition to our Rx. Ortho saw patient today and is recommending an L2-Pelvis    Pertinent Surgeries:  6/12/2024 - L2-Pelvis Fusion ([facility])    Pain Medication   15 mg Morphine extended release   Pregabalin 150 mg TID  Voltaren Gel   Tylenol     Image:     CT SCAN OF THE  LUMBAR SPINE WITHOUT CONTRAST AND WITH 3-D POST PROCESSING:    FINDINGS: There is mild lumbar levoscoliosis. The patient is status post percutaneous augmentation vertebroplasty, involving the L1 and L2 vertebra. There is grade 1 anterolisthesis of L4 on L5 and there are intradiscal spacers in the L4-5 and L5-S1 disc spaces. There is narrowing and vacuum phenomena involving the L1-2 disc space and there is narrowing of the L2-3 disc space.    The patient is status post bilateral dorsolateral fusion with rods transfixed by pedicular screws on the right and on the left at each vertebral body level from L2-S1. The patient's had a laminectomy extending from the inferior aspect of L3 through the S1 vertebra. The patient also had a bilateral posterolateral bony fusion from L2-L5.    There is no diastases of the sacroiliac joints. There is multilevel facet joint arthropathy and postoperative change. Streak artifact from the orthopedic hardware makes it impossible to evaluate the posterior lumbar disc margins.    Specifically the orthopedic hardware appears to be intact with no obvious hardware fracture or failure. Streak artifact makes it  "difficult to evaluate for possible hardware loosening but there is no "obvious "loosening of the hardware.    Impression    1. Extensive postop changes including bilateral dorsolateral fusion from L2-S1 with bilateral dorsolateral rods transfixed by pedicular screws at each vertebral level from L2 S1-. There is no obvious hardware fracture/failure. The patient also had a bilateral posterolateral bony fusion at L2-L5. There has been laminectomy that extends from the inferior aspect of L3-S1.  2. There has been percutaneous augmentation vertebroplasties at L2 and L3.  3. There is grade 1 anterolisthesis of L4 on L5 with disc space narrowing of the L1-2 and L2-3 discs, and vacuum phenomena at L1-L2.  4. There is mild lumbar levoscoliosis.      Narrative  [facility] [place] [facility] MRI LUMBAR SPINE WITHOUT CONTRAST    FINDINGS:  Extensive susceptibility artifact from L2-L5 posterior transpedicular fusion hardware, L4-S1 interbody cage grafts, and transverse sacroiliac fixation screws. L3-L5 decompressive laminectomies. Fixed grade 1 anterolisthesis of L4 and L5. Residual short-segment lumbar levocurvature centered on L2-L3.    Markedly decreased T1 and T2 signal within the bowel 1 and L2 vertebral bodies, greater than the extensive corresponding dense sclerosis on CT, potentially representing some combination of marrow sclerosis and/or infarct.    Severe disc height loss at L1-L2 with gas and/or calcification with the disc space. Large nonuniform posterior disc osteophyte complex with some central/left paracentral calcification, resulting in complete effacement of the thecal sac and severe spinal stenosis with mid sagittal thecal sac diameter 5 mm. Compromise of the lateral recesses with definite compression of the descending right L2 nerve roots as well as proximal foraminal portions at L2-L3. Severe bilateral foraminal stenosis, right greater than left. Approximately 3 mm of retrolisthesis of L1 on L2. Mild distraction of the right facet " joint with increased synovial fluid signal, likely degenerative.    L2-L3 ligamentum flavum hypertrophy and medial projecting right facet osteophytosis. Large 18 x 9 mm right paracentral/foraminal heterogenous disc extrusion with at least 13 mm of superior migration. The extruded disc appears to extend into the right paracentral/foraminal region at the L1-L2 level, contributing to right L1-L2 foraminal stenosis and lateral recess compromise. Complete effacement of the thecal sac and lateral recesses with mid sagittal diameter 8 mm. Small left foraminal/extraforaminal/far lateral disc-osteophyte complex. Severe right and moderate left foraminal stenosis.    Right-sided L3-L4 facet osteophyte projecting into the right neural foramen with moderate right foraminal stenosis.    No significant residual spinal stenosis at L3-S1.    The conus medullaris terminates at the T12-L1 interspace. There is subtly increased central T2 signal within the distal conus medullaris, possibly a tiny 5th ventricle. There is severe effacement of the conus medullaris nerve roots at the T1-T2, described above. No discrete thickening of the caudate equina nerve roots, although limited evaluation due to hardware susceptibility.    Advanced atrophy of the paraspinal musculature level of L3. Homogenous 4.8 x 3.1 x 8.1 cm midline fluid collection extending from the L2-L3 interspace to the inferior margin of S1, which broadly abuts the dorsal meninges without obvious defect, seroma versus meningocele.    Moderate atherosclerotic calcifications of the abdominal aorta and included iliac arteries. Bilateral extrarenal pelvis. Left hip intramedullary nail noted on localizer views.    Impression    1.   Redemonstrated postsurgical changes of L2 through bilateral sacroiliac posterior fixation with L3-L5 decompressive laminectomies and and L4-S1 interbody cage grafts. No evidence of mature osseous ankylosis of the posterior elements.  2.   Large L1-L2  posterior disc osteophyte complex and mild degenerative retrolisthesis resulting in severe spinal stenosis and severe bilateral foraminal stenosis.  3.   18 x 9 mm right paracentral/foraminal heterogenous disc extrusion at L2-L3 with significant superior migration combined with ligamentum flavum and right-sided facet hypertrophy with moderate spinal canal stenosis and severe right greater than left foraminal stenosis. Disc extrusion contributes to spinal and right-sided foraminal stenosis at L1-L2.  4.   Compromise of the lateral recesses at L1-L2 and L2-L3 with nerve root compression.      Past Medical History:   Diagnosis Date    Acid reflux     Allergy     Anemia     Asthma     Coronary artery disease     CS (cervical spondylosis) 03/08/2013    Degenerative disc disease     Degenerative joint disease of hindfoot, left 6/28/2022    Dry eyes     Dry mouth     Gastroparesis     History of methotrexate therapy 01/19/2022    Hyperlipidemia     Lateral meniscus derangement 04/06/2016    Lobular carcinoma in situ     Lumbar spondylosis 03/08/2013    Osteoarthritis     Osteoporosis     Pes planovalgus, acquired, left 6/28/2022    Rheumatoid arthritis(714.0)     Rupture of left triceps tendon 10/17/2018    Umbilical hernia 08/13/2015     Past Surgical History:   Procedure Laterality Date    BACK SURGERY  06/12/2024    revision of prior back surgery on 07/14/2023    BREAST BIOPSY Left 01/29/2002    core bx    CARPAL TUNNEL RELEASE Right 05/2017    CHOLECYSTECTOMY  2004    COLONOSCOPY      10/11    COLONOSCOPY N/A 06/29/2017    Procedure: COLONOSCOPY;  Surgeon: López Moore MD;  Location: [facility] ENDO [address]);  Service: Endoscopy;  Laterality: N/A;    EPIDURAL STEROID INJECTION N/A 11/18/2021    Procedure: INJECTION, STEROID, EPIDURAL, L5-S1 IL NEED CONSENT;  Surgeon: Tj Mayfield MD;  Location: [facility] PAIN MGT;  Service: Pain Management;  Laterality: N/A;    EPIDURAL STEROID INJECTION N/A 09/29/2022    Procedure: LUMBAR  L3/L4 IL MADELINE CONTRAST;  Surgeon: Tj Mayfield MD;  Location: [facility] PAIN MGT;  Service: Pain Management;  Laterality: N/A;    EPIDURAL STEROID INJECTION N/A 12/12/2022    Procedure: INJECTION, STEROID, EPIDURAL L5/S1 IL CONTRAST;  Surgeon: Tj Mayfield MD;  Location: [facility] PAIN MGT;  Service: Pain Management;  Laterality: N/A;    EPIDURAL STEROID INJECTION N/A 04/06/2023    Procedure: INJECTION, STEROID, EPIDURAL L5/S1;  Surgeon: Tj Mayfield MD;  Location: [facility] PAIN MGT;  Service: Pain Management;  Laterality: N/A;    FOOT ARTHRODESIS Left 01/11/2023    Procedure: FUSION, FOOT;  Surgeon: Ariella Finnegan MD;  Location: [facility] OR;  Service: Orthopedics;  [facility]: [facility]    FUSION, SPINE, LUMBAR, TLIF, POSTERIOR APPROACH, USING PEDICLE SCREW N/A 06/22/2023    Procedure: FUSION, SPINE, LUMBAR, TLIF, POSTERIOR APPROACH, USING PEDICLE SCREW L4/5 spinewave SNS:SSEP/EMG;  Surgeon: Jh Mosqueda MD;  [facility];  Service: Neurosurgery;  Laterality: N/A;    HARVESTING OF BONE GRAFT Left 01/11/2023    Procedure: SURGICAL PROCUREMENT, BONE GRAFT;  Surgeon: Ariella Finnegan MD;  Location: [facility] OR;  Service: Orthopedics;  Laterality: Left;    INJECTION OF ANESTHETIC AGENT AROUND NERVE Bilateral 08/23/2021    Procedure: BLOCK, NERVE, MEDIAL BRANCH L3,L4,L5;  Surgeon: Tj Mayfield MD;  Location: [facility] PAIN MGT;  Service: Pain Management;  Laterality: Bilateral;  1 of 2    INJECTION OF ANESTHETIC AGENT AROUND NERVE Bilateral 08/26/2021    Procedure: BLOCK, NERVE, MEDIAL BRANCH L3,L4,L5;  Surgeon: Tj Mayfield MD;  Location: [facility] PAIN MGT;  Service: Pain Management;  Laterality: Bilateral;  2 of 2    INJECTION OF ANESTHETIC AGENT AROUND NERVE Left 07/07/2022    Procedure: BLOCK, NERVE, LEFT OBTURATOR AND FEMORAL;  Surgeon: Tj Mayfield MD;  Location: [facility] PAIN MGT;  Service: Pain Management;  Laterality: Left;    INJECTION OF FACET JOINT Bilateral 03/12/2020    Procedure: FACET JOINT INJECTION (LUMBAR BLOCK)  BILATERAL L4-5 AND L5-S1 DIRECT REFERRAL;  Surgeon: Tj Mayfield MD;  Location: [facility] PAIN MGT;  Service: Pain Management;  Laterality: Bilateral;  NEEDS CONSENT    INJECTION OF FACET JOINT Bilateral 03/29/2021    Procedure: INJECTION, FACET JOINT L3/4, L4/5, L5/S1;  Surgeon: Tj Mayfield MD;  Location: [facility] PAIN MGT;  Service: Pain Management;  Laterality: Bilateral;    INJECTION OF JOINT Right 07/30/2020    Procedure: INJECTION, JOINT, RIGHT HIP Interarticular under flouro;  Surgeon: Tj Mayfield MD;  Location: [facility] PAIN MGT;  Service: Pain Management;  Laterality: Right;  INJECTION, JOINT, RIGHT HIP Interarticular under flouro    INJECTION OF JOINT Right 02/21/2022    Procedure: Injection, Joint RIGHT ISCHIAL BURSA;  Surgeon: Tj Mayfield MD;  Location: [facility] PAIN MGT;  Service: Pain Management;  Laterality: Right;    INTRAMEDULLARY RODDING OF FEMUR Left 05/21/2019    Procedure: INSERTION, INTRAMEDULLARY ARIEL, FEMUR;  Surgeon: López Duff MD;  Location: [address];  Service: Orthopedics;  Laterality: Left;    LENGTHENING OF TENDON OF LOWER EXTREMITY Left 01/11/2023    Procedure: LENGTHENING, TENDON, LOWER EXTREMITY;  Surgeon: Ariella Finnegan MD;  Location: [facility];  Service: Orthopedics;  Laterality: Left;    MAGNETIC RESONANCE IMAGING N/A 05/29/2023    Procedure: MRI (Magnetic Resonance Imagine);  Surgeon: Sujey Robin;  Location: [facility];  Service: Anesthesiology;  Laterality: N/A;    MAGNETIC RESONANCE IMAGING N/A 01/10/2024    Procedure: MRI (Magnetic Resonance Imagine);  Surgeon: Sujey Robin;  Location: [facility];  Service: Anesthesiology;  Laterality: N/A;    RADIOFREQUENCY ABLATION Left 09/20/2021    Procedure: RADIOFREQUENCY ABLATION left L3,4,5 RFA  1 of 2  CONSENT NEEDED;  Surgeon: Tj Mayfield MD;  Location: [facility] PAIN MGT;  Service: Pain Management;  Laterality: Left;    RADIOFREQUENCY ABLATION Right 10/04/2021    Procedure: RADIOFREQUENCY ABLATION  right L3,4,5 RFA   2 of 2  CONSENT NEEDED;   Surgeon: Tj Mayfield MD;  Location: [facility] PAIN MGT;  Service: Pain Management;  Laterality: Right;    RADIOFREQUENCY ABLATION Left 04/21/2022    Procedure: Radiofrequency Ablation LEFT L3,L4,L5;  Surgeon: Tj Mayfield MD;  Location: [facility] PAIN MGT;  Service: Pain Management;  Laterality: Left;    RADIOFREQUENCY ABLATION Right 05/12/2022    Procedure: Radiofrequency Ablation RIGHT L3,L4,L5;  Surgeon: Tj Mayfield MD;  Location: [facility] PAIN MGT;  Service: Pain Management;  Laterality: Right;    RADIOFREQUENCY ABLATION Left 07/25/2022    Procedure: Radiofrequency Ablation Left Femoral & Obturator;  Surgeon: Tj Mayfield MD;  Location: [facility] PAIN MGT;  Service: Pain Management;  Laterality: Left;    REPAIR OF TRICEPS TENDON Left 10/17/2018    Procedure: REPAIR, TENDON, TRICEPS left elbow;  Surgeon: Staci Yarbrough MD;  Location: [facility] OR [address] [place] [address];  Service: Orthopedics;  Laterality: Left;  Anesthesia: General and regional. PRONE, k-wire , hand pan 1 and pan 2, CALL ARTHREX/Tamera notified 10-12 LO    TONSILLECTOMY      TRANSFORAMINAL EPIDURAL INJECTION OF STEROID Right 08/31/2020    Procedure: INJECTION, STEROID, EPIDURAL, TRANSFORAMINAL,  APPROACH, L3-L4 and L4-L5 need consent;  Surgeon: Tj Mayfield MD;  Location: [facility] PAIN MGT;  Service: Pain Management;  Laterality: Right;    TRANSFORAMINAL EPIDURAL INJECTION OF STEROID Bilateral 07/23/2021    Procedure: INJECTION, STEROID, EPIDURAL, TRANSFORAMINAL APPROACH L4/5;  Surgeon: Tj Mayfield MD;  Location: [facility] PAIN MGT;  Service: Pain Management;  Laterality: Bilateral;    TRANSFORAMINAL EPIDURAL INJECTION OF STEROID Bilateral 09/25/2023    Procedure: LUMBAR TRANSFORAMINAL BILATERAL  L2/3 DIRECT REFERRAL;  Surgeon: Tj Mayfield MD;  Location: [facility] PAIN MGT;  Service: Pain Management;  Laterality: Bilateral;    TRANSFORAMINAL EPIDURAL INJECTION OF STEROID Left 10/18/2023    Procedure: LUMBAR TRANSFORAMINAL LEFT L3/4 AND L4/5 * CLEARANCE IN CHART*;   "Surgeon: Tj Mayfield MD;  Location: [facility] PAIN MGT;  Service: Pain Management;  Laterality: Left;    TRIGGER POINT INJECTION N/A 07/23/2021    Procedure: INJECTION, TRIGGER POINT SCAPULAR;  Surgeon: Tj Mayfield MD;  Location: [facility] PAIN MGT;  Service: Pain Management;  Laterality: N/A;    TUBAL LIGATION  2003    UPPER GASTROINTESTINAL ENDOSCOPY      10/11    uterine ablation  2003     Review of patient's allergies indicates:   Allergen Reactions    Actemra [tocilizumab] Other (See Comments)     Severe dizziness    Codeine Nausea And Vomiting and Hives    Gold au 198 Hives and Rash    Hydroxychloroquine Other (See Comments)     Can't remember the reaction      Iodinated contrast media Other (See Comments)     Other reaction(s): BURNING ALL OVER    Iodine Other (See Comments) and Hives     Other reaction(s): BURNING ALL OVER - Iodine dye - Not topical    Ondansetron Nausea And Vomiting    Sulfa (sulfonamide antibiotics) Other (See Comments)     Can't remember the reaction    Zofran [ondansetron hcl (pf)] Nausea And Vomiting     Pt reports that last time she received zofran she started vomiting again    Methotrexate analogues Nausea Only    Pneumococcal vaccine Other (See Comments)    Pneumovax 23 [pneumococcal 23-argenis ps vaccine] Other (See Comments)     "sick"    Methotrexate Nausea Only     Current Outpatient Medications   Medication Sig Dispense Refill    albuterol (PROVENTIL/VENTOLIN HFA) 90 mcg/actuation inhaler Inhale 2 puffs into the lungs every 6 (six) hours as needed for Wheezing. Rescue 20.1 g 11    BD ULTRA-FINE JAIME PEN NEEDLE 32 gauge x 5/32" Ndle For Forteo - inject daily 100 each 3    budesonide-formoterol 160-4.5 mcg (SYMBICORT) 160-4.5 mcg/actuation HFAA Inhale 2 puffs into the lungs every 12 (twelve) hours. 30.6 g 3    calcium citrate-vitamin D3 315-200 mg (CITRACAL+D) 315 mg-5 mcg (200 unit) per tablet Take 1 tablet by mouth 2 (two) times daily.       cefdinir (OMNICEF) 300 MG capsule Take 1 capsule " by mouth every 12 (twelve) hours 60 capsule 1    cycloSPORINE (RESTASIS) 0.05 % ophthalmic emulsion Place 1 drop into both eyes 2 (two) times daily. 180 each 3    diclofenac sodium (VOLTAREN) 1 % Gel APPLY 2 GRAMS TOPICALLY 4 (FOUR) TIMES DAILY AS NEEDED. 300 g 2    fluconazole (DIFLUCAN) 150 MG Tab Take 150 mg by mouth as needed.      INV PROPULSID 10 MG Take 2 tablets (20 mg) by mouth 4 (four) times daily. FOR INVESTIGATIONAL USE ONLY. Protocol [id number] Subject ID: [id number]. 1440 each 0    leflunomide (ARAVA) 20 MG Tab Take 1 tablet (20 mg total) by mouth once daily. 90 tablet 0    lifitegrast (XIIDRA) 5 % Dpet INSTILL ONE DROP INTO BOTH EYES TWICE A DAY 60 mL 10    magic mouthwash diphen/antac/lidoc rinse mouth with 5 ml for 30 seconds and spit out, twice daily 150 mL 2    methenamine (HIPREX) 1 gram Tab Take 1 tablet (1 g total) by mouth 2 (two) times daily. 60 tablet 11    mirabegron (MYRBETRIQ) 25 mg Tb24 ER tablet Take 1 tablet (25 mg total) by mouth once daily. 30 tablet 11    montelukast (SINGULAIR) 10 mg tablet Take 1 tablet (10 mg total) by mouth every evening. 90 tablet 3    morphine (MS CONTIN) 15 MG 12 hr tablet Take 1 tablet (15 mg total) by mouth 2 (two) times daily. 60 tablet 0    naloxone (NARCAN) 4 mg/actuation Spry 4mg by nasal route as needed for opioid overdose; may repeat every 2-3 minutes in alternating nostrils until medical help arrives. Call 911 2 each 11    naproxen (NAPROSYN) 500 MG tablet TAKE 1 TABLET BY MOUTH TWICE A DAY AS NEEDED 180 tablet 0    ofloxacin (FLOXIN) 0.3 % otic solution Place 3 drops into the right ear 2 (two) times daily. for 10 days 10 mL 0    omeprazole-sodium bicarbonate (ZEGERID) 40-1.1 mg-gram per capsule TAKE 1 CAPSULE BY MOUTH EVERY DAY IN THE MORNING 90 capsule 3    pantoprazole (PROTONIX) 40 MG tablet Take 1 tablet (40 mg total) by mouth 2 (two) times daily. 180 tablet 3    pentoxifylline (TRENTAL) 400 mg TbSR Take 1 tablet (400 mg total) by mouth 2 (two)  times a day. 180 tablet 0    potassium chloride SA (K-DUR,KLOR-CON) 20 MEQ tablet Take 1 tablet (20 mEq total) by mouth once daily. 90 tablet 3    predniSONE (DELTASONE) 1 MG tablet Take 1 tablet (1 mg total) by mouth once daily. 90 tablet 1    pregabalin (LYRICA) 150 MG capsule Take 1 capsule (150 mg total) by mouth 3 (three) times daily. 270 capsule 3    promethazine (PHENERGAN) 25 MG tablet Take 1 tablet (25 mg total) by mouth every 6 (six) hours as needed for Nausea. 90 tablet 5    rosuvastatin (CRESTOR) 20 MG tablet TAKE 1 TABLET BY MOUTH EVERY DAY IN THE EVENING 90 tablet 3    sarilumab (KEVZARA) 200 mg/1.14 mL Syrg Inject 1.14 mL (200 mg total) into the skin every 14 (fourteen) days. 2.28 mL 2    senna-docusate 8.6-50 mg (SENNA PLUS) 8.6-50 mg per tablet Take 1 tablet by mouth daily 30 tablet 1    STIMULANT LAXATIVE PLUS 8.6-50 mg per tablet Take 1 tablet by mouth.      sucralfate (CARAFATE) 1 gram tablet TAKE 1 TABLET BY MOUTH FOUR TIMES A  tablet 2    teriparatide (FORTEO) 20 mcg/dose (600mcg/2.4mL) PnIj Inject 0.08 mLs (20 mcg total) into the skin once daily. 2.4 mL 11    tiZANidine (ZANAFLEX) 4 MG tablet Take 1 tablet (4 mg total) by mouth every 6 (six) hours as needed (for muscle spasms). 120 tablet 1    triamcinolone (NASACORT) 55 mcg nasal inhaler 2 sprays by Nasal route once daily. 16.9 mL 5    vitamin E 1000 UNIT capsule Take 1,000 Units by mouth.      albuterol-ipratropium (DUO-NEB) 2.5 mg-0.5 mg/3 mL nebulizer solution Take 3 mLs by nebulization every 6 (six) hours as needed for Wheezing. Rescue 90 mL 3     No current facility-administered medications for this visit.     Family History   Problem Relation Name Age of Onset    Cancer Mother 69         Lung Cancer    Emphysema Mother 69     Heart attack Mother 69     Alcohol abuse Mother 69     Cancer Father          Lung Cancer    Osteoarthritis Father      Lung cancer Father      Skin cancer Father      Alcohol abuse Father      Heart disease  Brother 57     Heart attack Brother 57     Alcohol abuse Brother 57     Cirrhosis Brother 57     Osteoarthritis Paternal Aunt      Retinal detachment Maternal Aunt      No Known Problems Sister      No Known Problems Maternal Uncle      No Known Problems Paternal Uncle      No Known Problems Maternal Grandmother      No Known Problems Maternal Grandfather      No Known Problems Paternal Grandmother      No Known Problems Paternal Grandfather      Colon cancer Neg Hx      Esophageal cancer Neg Hx      Stomach cancer Neg Hx      Celiac disease Neg Hx      Diabetes Neg Hx      Thyroid disease Neg Hx      Amblyopia Neg Hx      Blindness Neg Hx      Cataracts Neg Hx      Glaucoma Neg Hx      Hypertension Neg Hx      Macular degeneration Neg Hx      Strabismus Neg Hx      Stroke Neg Hx       Social History     Socioeconomic History    Marital status:    Tobacco Use    Smoking status: Never    Smokeless tobacco: Never   Substance and Sexual Activity    Alcohol use: Yes     Comment: 2-3 times per year.    Drug use: No    Sexual activity: Yes     Partners: Male     Birth control/protection: Surgical     Social Drivers of Health     Financial Resource Strain: Patient Declined (3/12/2025)    Received from [facility]    Overall Financial Resource Strain (CARDIA)     Difficulty of Paying Living Expenses: Patient declined   Food Insecurity: Patient Declined (3/12/2025)    Received from [facility]    Hunger Vital Sign     Worried About Running Out of Food in the Last Year: Patient declined     Ran Out of Food in the Last Year: Patient declined   Transportation Needs: Patient Declined (3/12/2025)    Received from [facility]    PRAPARE - Transportation     Lack of Transportation (Medical): Patient declined     Lack of Transportation (Non-Medical): Patient declined   Physical Activity: Inactive (3/12/2025)    Received from [facility]    Exercise Vital Sign     Days of Exercise per Week: 0 days     Minutes of Exercise per  Session: 0 min   Stress: Stress Concern Present (3/12/2025)    Received from [facility]    [employer] - Occupational Stress Questionnaire     Feeling of Stress : To some extent   Housing Stability: Unknown (3/12/2025)    Received from [facility]    Housing Stability Vital Sign     Unable to Pay for Housing in the Last Year: Patient declined     REVIEW OF SYSTEMS:    GENERAL:  No weight loss, malaise or fevers.  HEENT:  Negative for frequent or significant headaches.  NECK:  Negative for lumps, goiter, pain and significant neck swelling.  RESPIRATORY:  Negative for cough, wheezing or shortness of breath.  CARDIOVASCULAR:  Negative for chest pain, leg swelling or palpitations.  GI:  Negative for abdominal discomfort, blood in stools or black stools or change in bowel habits.  MUSCULOSKELETAL:  See HPI. +Back pain.   SKIN:  Negative for lesions, rash, and itching.  PSYCH:  Negative for sleep disturbance, mood disorder and recent psychosocial stressors.  HEMATOLOGY/LYMPHOLOGY:  Negative for prolonged bleeding, bruising easily or swollen nodes.  NEURO:   No history of headaches, syncope, paralysis, seizures or tremors.  All other reviewed and negative other than HPI.       Objective:   BP (!) 113/56 (Patient Position: Sitting)   Pulse 91   Temp 97.6 °F (36.4 °C)   Wt 67 kg (147 lb 11.3 oz)   LMP  (LMP Unknown)   BMI 27.91 kg/m²   Pain Disability Index Review:      4/9/2025    10:06 AM 3/11/2025    10:26 AM 1/6/2025    10:50 AM   Last 3 PDI Scores   Pain Disability Index (PDI) 54 35 48     PHYSICAL EXAMINATION:    GENERAL APPEARANCE: Well appearing, in no acute distress. Using Wheelchair for mobility  PSYCH:  Mood and affect appropriate.  SKIN: Skin color, texture, turgor normal, no rashes or lesions to visible areas.  HEAD/FACE:  Normocephalic, atraumatic.  NECK: No pain to palpation over the cervical paraspinous muscles. Spurling Negative. No pain with neck flexion, extension, or  lateral flexion.   CARDIO: Rate regular.  No lower extremity edema. Capillary refill <2 seconds.   PULM: Bilateral chest rise. No apparent respiratory distress.   GI:  Non-distended  BACK: Straight leg raising in the sitting position is negative to radicular pain. Negative SANDRA/FADIR. Positive facet loading bilaterally. Limited ROM with pain on extension and flexion. TTP over lumbar paraspinal muscles bilaterally.  EXTREMITIES: Peripheral joint ROM is full and pain free without obvious instability or laxity in all four extremities. No deformities, edema, or skin discoloration.   MUSCULOSKELETAL: Bilateral upper and lower extremity strength is normal and symmetric.  No atrophy or tone abnormalities are noted.   NEURO: Bilateral upper and lower extremity coordination and muscle stretch reflexes are physiologic and symmetric.   GAIT: Antalgic- presents in wheelchair.        Assessment:     Joyce Peterson is a 64 y.o. female with history of RA and s/p L2-pelvic fusion that presents for chronic low back, knee, and hip pain.      Encounter Diagnoses   Name Primary?    Lumbar radiculopathy Yes    Lumbar spondylosis     Encounter for long-term opiate analgesic use     S/P lumbar spinal fusion     Spinal stenosis of lumbar region with neurogenic claudication     Post laminectomy syndrome     Degeneration of intervertebral disc of lumbar region with discogenic back pain and lower extremity pain     Chronic pain disorder      Plan:   We discussed with the patient the assessment and recommendations. The following is the plan we agreed on:    Images reviewed and discussed with patient.   Increase MS Contin to 15 mg Q12 hour to Q8 hours since patient is not having sufficient pain relief with Q12 regimen. She was previously on 30 mg Q12.   Continue to follow up with Neurosurgery, ID and Rheumatology.  Continue with HEP.   Schedule B/L TFESI L3/4.   UDS on 3/11/2025 and appears to be appropriate.    reviewed and appears to  be appropriate.   Order for SCS trial after Hyperbarics (Due to complete Hyperbarics in the middle of June). [facility]. To be schedule after patient completes her Hyperbaric treatment. Ordered Psych clearance for SCS.   Follow up 2 weeks after procedure.       Luis Cuevas MD PGY-5  Interventional Pain Medicine Fellow   [facility]     Visit today included increased complexity associated with the care of the episodic problem of chronic pain which was addressed and continue to manage the longitudinal care of the patient due to the serious and/or complex managed problem(s) listed above.   I have personally taken the history and examined this patient and agree with the fellow's note as stated above.

## 2025-04-10 ENCOUNTER — PATIENT MESSAGE (OUTPATIENT)
Dept: OPTOMETRY | Facility: CLINIC | Age: 65
End: 2025-04-10
Payer: MEDICARE

## 2025-04-14 ENCOUNTER — CLINICAL SUPPORT (OUTPATIENT)
Dept: OTOLARYNGOLOGY | Facility: CLINIC | Age: 65
End: 2025-04-14
Payer: MEDICARE

## 2025-04-14 ENCOUNTER — OFFICE VISIT (OUTPATIENT)
Dept: OTOLARYNGOLOGY | Facility: CLINIC | Age: 65
End: 2025-04-14
Payer: MEDICARE

## 2025-04-14 VITALS
SYSTOLIC BLOOD PRESSURE: 107 MMHG | DIASTOLIC BLOOD PRESSURE: 67 MMHG | BODY MASS INDEX: 27.43 KG/M2 | WEIGHT: 145.19 LBS | HEART RATE: 90 BPM

## 2025-04-14 DIAGNOSIS — H69.93 DYSFUNCTION OF BOTH EUSTACHIAN TUBES: Primary | ICD-10-CM

## 2025-04-14 DIAGNOSIS — J31.0 CHRONIC RHINITIS: Chronic | ICD-10-CM

## 2025-04-14 DIAGNOSIS — H91.93 HIGH FREQUENCY HEARING LOSS OF BOTH EARS: Chronic | ICD-10-CM

## 2025-04-14 DIAGNOSIS — T70.0XXD OTITIC BAROTRAUMA, SUBSEQUENT ENCOUNTER: ICD-10-CM

## 2025-04-14 DIAGNOSIS — H69.93 DYSFUNCTION OF BOTH EUSTACHIAN TUBES: Primary | Chronic | ICD-10-CM

## 2025-04-14 DIAGNOSIS — H91.93 HIGH FREQUENCY HEARING LOSS OF BOTH EARS: ICD-10-CM

## 2025-04-14 PROCEDURE — 3008F BODY MASS INDEX DOCD: CPT | Mod: CPTII,S$GLB,, | Performed by: OTOLARYNGOLOGY

## 2025-04-14 PROCEDURE — 3078F DIAST BP <80 MM HG: CPT | Mod: CPTII,S$GLB,, | Performed by: OTOLARYNGOLOGY

## 2025-04-14 PROCEDURE — 1160F RVW MEDS BY RX/DR IN RCRD: CPT | Mod: CPTII,S$GLB,, | Performed by: OTOLARYNGOLOGY

## 2025-04-14 PROCEDURE — 3074F SYST BP LT 130 MM HG: CPT | Mod: CPTII,S$GLB,, | Performed by: OTOLARYNGOLOGY

## 2025-04-14 PROCEDURE — 99999 PR PBB SHADOW E&M-EST. PATIENT-LVL IV: CPT | Mod: PBBFAC,,, | Performed by: OTOLARYNGOLOGY

## 2025-04-14 PROCEDURE — 1159F MED LIST DOCD IN RCRD: CPT | Mod: CPTII,S$GLB,, | Performed by: OTOLARYNGOLOGY

## 2025-04-14 PROCEDURE — 99214 OFFICE O/P EST MOD 30 MIN: CPT | Mod: S$GLB,,, | Performed by: OTOLARYNGOLOGY

## 2025-04-14 NOTE — PROGRESS NOTES
Chief Complaint   Patient presents with    Ear Problem     Right ear hearing loss        HPI:  Patient is a 64 y.o. female who has previously seen me for chronic rhinitis, chronic otitis externa, right ear canal lesion, previous sialoadenitis.  At last visit she was treated with Ciprodex otic drops for right ear canal inflammation, and mupirocin for left nasal vestibule inflammation and small lesion.  She presents today for follow-up to re-examine these areas and assess response to therapy.    Since the last visit, the patient reports the nasal crusting and irritation has improved though does occasionally flare up.  She used Ciprodex drops on the right ear for short time, but they were causing pain with use so she stopped.  She has not noticed any otorrhea or other ongoing issues with the right ear, though she occasionally has mild discomfort.  She does complain of purulent nasal secretions and thick postnasal drip over the last few weeks.  She has some sinus pressure and ear pressure as well.  She does report a history of allergic rhinitis symptoms in the past and has had some occasional sinusitis when allergies flare.      Interval HPI 11/26/2024 :      Patient reports she has not had any significant otorrhea from the right.  She still continues to use Q-tips in the ears and does notice some scant blood in the ear when she does this.  Previous cultures from last visit did not grow any bacteria or fungus.    She also reports mild increase in her rhinitis symptoms over the last few days.  She uses flonase and azelastine occasionally, and she uses zyrtec, but antihistamines cause significant drying of her eyes.    Interval HPI 02/13/2025 :      Patient was using [employer] eardrops and felt the issue was improving so she had stopped use recently.  She does not report any changes or worsening of the right ear symptoms.  She has had an increase in her allergy/rhinitis symptoms over the last day.        Component  "2 mo ago   Aerobic Bacterial Culture  Abnormal   STAPHYLOCOCCUS AUREUS  Many    Resulting Agency [facility]        Susceptibility     Staphylococcus aureus     CULTURE, AEROBIC  (SPECIFY SOURCE)     Clindamycin <=0.5 mcg/mL Resistant     Erythromycin >4 mcg/mL Resistant     Oxacillin <=0.25 mcg/mL Sensitive     Tetracycline <=4 mcg/mL Sensitive     Trimeth/Sulfa <=0.5/9.5 m... Sensitive                   Interval HPI 03/20/2025 :      Patient started hyperbaric O2 treatments and noted to have possible serous effusion after first "dive".  She was able to depressurize her left ear but not her right during her descent at her HBO treatment.  Afterwards, the nurse noted erythema and effusion in the right ME with trace bloody effusion.   She feels it has improved since a few days ago, but she still cannot get the ear to pop.   She has not been using nasacort due to eye side effects.    Interval HPI 03/24/2025 :    Took steroids, no significant improvement in symptoms.  Here for PET placement for HBO.  Right ear still feels clogged.  No pain.    Interval HPI 04/14/2025 :    Patient had PET placed in right ear at last visit.   Patient complains that she feels her hearing is off in the right ear since her 1st hyperbaric treatment.  She used eardrops after placement if her PE tube at last visit, but has not continued to use these past the 1 week harsha.  She does not report any otorrhea or ear pain.  She has continued hyperbaric oxygen treatments and is able to easily Depressurize the left ear.        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    Thyroid nodule 08/22/2013    Hyperlipidemia 08/22/2013    Mild intermittent asthma without complication 11/04/2015    Rheumatoid arthritis involving multiple sites 11/16/2016    Iron deficiency anemia due to chronic blood loss 11/14/2017    Sjogren's syndrome " 05/21/2018    Candidal esophagitis 10/15/2018    Coronary artery disease involving native coronary artery of native heart without angina pectoris 07/16/2019    Subclinical hyperthyroidism 02/21/2020    Dry eyes     Ureterocele     Spinal stenosis, lumbar region without neurogenic claudication 07/23/2021    History of methotrexate therapy 01/19/2022    NAFLD (nonalcoholic fatty liver disease) 12/29/2022    Chronic pain 12/29/2022    Hypokalemia 06/24/2023    Arthrodesis status 06/29/2023    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 01/11/2024    Nausea 04/15/2024    Acute deep vein thrombosis (DVT) of right peroneal vein 07/22/2024     Resolved Ambulatory Problems     Diagnosis Date Noted    Osteoarthritis involving multiple joints on both sides of body 08/01/2012    Coronary artery disease 02/06/2013    Posterior tibial tendinitis of left leg 03/08/2013    Lumbar spondylosis 03/08/2013    CS (cervical spondylosis) 03/08/2013    Osteoarthritis of both knees 03/08/2013    Occipital neuralgia 08/13/2014    Osteoarthritis of CMC joint of thumb 11/13/2014    URTI (acute upper respiratory infection) 07/28/2015    Umbilical hernia 08/13/2015    CMC (carpometacarpal) synovitis 10/19/2015    AR (allergic rhinitis) 11/04/2015    Iron deficiency anemia 11/11/2015    Vomiting 11/23/2015    Viral gastroenteritis 11/23/2015    Sepsis 11/23/2015    SIRS due to infectious process with organ dysfunction 11/23/2015    Intractable vomiting with nausea 11/23/2015    Volume depletion, gastrointestinal loss 11/23/2015    Immunosuppressed status 03/03/2016    Lateral meniscus derangement 04/06/2016    Lateral epicondylitis of right elbow 08/10/2016    Pleurisy 11/01/2016    Thoracic back pain 02/07/2017    Low back pain 02/07/2017    Carpal tunnel syndrome of right wrist 04/04/2017    Elevated parathyroid hormone 05/10/2017    Low TSH level 05/10/2017    Hypogammaglobulinemia 05/10/2017    Right carpal tunnel syndrome 05/29/2017    Iron deficiency  anemia due to chronic blood loss 06/29/2017    Pain in right hand 06/30/2017    Pain in right elbow 06/30/2017    Range of motion deficit 06/30/2017    Decreased  strength of right hand 06/30/2017    Right shoulder pain 08/21/2017    Pure hypercholesterolemia 01/08/2018    Research study patient 04/30/2018    Left elbow pain 06/20/2018    Rupture of left triceps tendon 10/17/2018    Pain in left elbow 11/01/2018    Stiffness of left elbow joint 01/10/2019    Weakness of left arm 01/10/2019    Left hip pain 03/06/2019    Acute hip pain, left 03/20/2019    Gait abnormality 03/20/2019    Greater trochanteric bursitis of left hip 05/13/2019    Stress fracture of neck of left femur 05/20/2019    Left leg weakness 06/06/2019    Impaired functional mobility, balance, gait, and endurance 06/13/2019    Buttock pain 09/27/2019    Difficulty walking 12/05/2019    ROBERT (iron deficiency anemia) 01/24/2020    Fibromyalgia 03/12/2020    Recurrent UTI     Atrophic vaginitis     Drug-induced constipation     Dry mouth     Nocturia     Urinary frequency     Urge urinary incontinence     RADHA (stress urinary incontinence, female)     Elevated serum GGT level 03/17/2021    History of long term use of Methotrexate  03/17/2021    Transaminasemia 03/17/2021    Chronic pain 03/29/2021    Acute pain of left shoulder 04/26/2021    Osteoarthritis of lumbar spine 08/23/2021    Decreased range of motion with decreased strength 10/05/2021    Impaired mobility and activities of daily living 10/05/2021    Decreased strength of lower extremity 10/08/2021    Healthcare maintenance 01/18/2022    Abnormal finding of blood chemistry, unspecified  01/19/2022    Stress fracture of left foot 06/28/2022    Primary osteoarthritis, left ankle and foot 06/28/2022    Pes planovalgus, acquired, left 06/28/2022    Degenerative joint disease of hindfoot, left 06/28/2022    Decreased strength, endurance, and mobility 07/05/2022    Impaired tolerance of activity  07/05/2022    Current chronic use of systemic steroids 12/29/2022    Hormone replacement therapy (HRT) 12/29/2022    Edema 12/29/2022    Leukopenia 12/29/2022    History of COVID-19 12/29/2022    Neuropathic pain of left foot 02/03/2023    Impaired gait and mobility 03/01/2023    Lumbar stenosis 06/23/2023    Lower extremity edema 06/23/2023    Cutaneous abscess of right knee 06/27/2023    Long term (current) use of inhaled steroids 06/29/2023    History of falling 06/29/2023    Encounter for other orthopedic aftercare 06/29/2023    Normocytic anemia 06/29/2023    Age-related osteoporosis without current pathological fracture 06/29/2023    Long-term use of high-risk medication 11/07/2023    S/P lumbar fusion 07/31/2024     Past Medical History:   Diagnosis Date    Acid reflux     Allergy     Anemia     Asthma     Degenerative disc disease     Lobular carcinoma in situ     Osteoarthritis     Osteoporosis     Rheumatoid arthritis(714.0)        Review of Systems  General: negative for chills, fever or weight loss  Psychological: negative for mood changes or depression  Ophthalmic: negative for blurry vision, photophobia or eye pain  ENT: see HPI  Respiratory: no cough, shortness of breath, or wheezing  Cardiovascular: no chest pain or dyspnea on exertion  Gastrointestinal: no abdominal pain, change in bowel habits, or black/ bloody stools  Musculoskeletal: negative for gait disturbance or muscular weakness  Neurological: no syncope or seizures; no ataxia  Dermatological: negative for pruritis, rash and jaundice  Hematologic/lymphatic: no easy bruising, no new adenopathy    Physical Exam     Vitals:    04/14/25 1358   BP: 107/67   Pulse: 90             Constitutional:   She is oriented to person, place, and time. Vital signs are normal. She appears well-developed and well-nourished. She appears alert. She is cooperative. Normal speech.      Head:  Normocephalic and atraumatic. Salivary glands normal.  Facial strength is  normal.      Ears:    Right Ear: No drainage or tenderness (mild tenderness in lateral portion EAC near where crusted area located). Tympanic membrane is not injected, not perforated, not erythematous and not retracted. No middle ear effusion (scant, bloody). A PE tube is seen.   Left Ear: No drainage. Tympanic membrane is not perforated, not erythematous and not retracted.  No middle ear effusion.   Ears:      Nose:  Mucosal edema and rhinorrhea present. No septal deviation or polyps. Turbinates abnormal and turbinate hypertrophy (3+, boggy, congested mucosa).  Right sinus exhibits no maxillary sinus tenderness and no frontal sinus tenderness. Left sinus exhibits no maxillary sinus tenderness and no frontal sinus tenderness.     Mouth/Throat  Oropharynx clear and moist without lesions or asymmetry, lips, teeth, and gums normal and oropharynx normal. No mucous membrane lesions. No oropharyngeal exudate, posterior oropharyngeal edema or posterior oropharyngeal erythema. Mirror exam not performed due to patient tolerance.  Mirror exam not performed due to patient tolerance.      Neck:  Neck normal without thyromegaly masses, asymmetry, normal tracheal structure, crepitus, and tenderness, thyroid normal, trachea normal, phonation normal, full range of motion with neck supple and no adenopathy. No JVD present. Carotid bruit is not present. No thyroid mass and no thyromegaly present.     She has no cervical adenopathy.     Cardiovascular:    Normal rate, regular rhythm and rate and rhythm, heart sounds, and pulses normal.              Pulmonary/Chest:   Effort and breath sounds normal.     Psychiatric:   She has a normal mood and affect. Her speech is normal and behavior is normal.     Neurological:   She is alert and oriented to person, place, and time. She has neurological normal, alert and oriented. No cranial nerve deficit.     Skin:   No abrasions, lacerations, lesions, or rashes.         Previous Audiogram:  Interpreted by me and reviewed with the patient today.    Hearing and right ear stable from previous.  No changes noted.  Large volume and type B tympanogram right, type C tympanogram left.              Assessment/Plan:      ICD-10-CM ICD-9-CM    1. Dysfunction of both eustachian tubes  H69.93 381.81       2. Chronic rhinitis  J31.0 472.0       3. 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 Otitic barotrauma, subsequent encounter  T70.0XXD V58.89      993.0           I counseled the patient that I do not see any abnormalities of the hearing.  Frequently after placement of PE tube, there is a change to the sensation of the ear, and it may just take time for the off sensation she complains of to improve.    She will plan to complete her hyperbaric oxygen treatments, and if the right ear is still bothersome to her, we can consider removal of the PE tube at that time.    Follow-up in 2-3 months or sooner if needed.    Love Whaley MD  [facility]

## 2025-04-14 NOTE — PROGRESS NOTES
Joyce Peterson was seen today in the clinic for an audiologic evaluation.  Her main complaint was decreased hearing in the right ear since PE tube placement.    Tympanometry revealed a Type B tympanogram with a 4.0 ear canal volume for the right ear and a Type C tympanogram for the left ear.  Audiogram results revealed a mild to severe high frequency hearing loss for the right ear.  A speech reception thresholds was obtained at 15dB for the right ear.  The left ear was not tested at today's visit.    Recommendations:  Otologic evaluation  Follow up as needed or annually  Hearing protection in noise

## 2025-04-14 NOTE — PATIENT INSTRUCTIONS
Hearing normal today.    PET healed well and functioning properly.     Follow up after HBO complete.

## 2025-04-16 RX ORDER — PANTOPRAZOLE SODIUM 40 MG/1
40 TABLET, DELAYED RELEASE ORAL 2 TIMES DAILY
Qty: 60 TABLET | Refills: 3 | Status: SHIPPED | OUTPATIENT
Start: 2025-04-16

## 2025-04-16 NOTE — TELEPHONE ENCOUNTER
Patient requesting refill postop    Pain 4/10, reasonable with q4h oxy and tylenol     sent new rx to MD to sign.   Subjective:   @Patient ID:  Jean Shaw Jr. is a 70 y.o. male who presents for evaluation of No chief complaint on file.      HPI:     Patient is a 69-year-old male with past medical history of coronary artery disease left heart catheterization done in 2014 for abnormal stress test noted to have 70% OM1 disease and 30% RCA disease that is medically managed, previous echocardiogram without any major abnormalities.  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  Previously had some atypical chest pain symptoms that resolved spontaneously.  No major perfusion defects noted on recent stress echocardiogram.  Previously seen in the cardiology clinic for preoperative cardiac risk assessment for   Nasal polyp removal        Euvolemic on examination.  LDL 80 A1c of 5.6 on blood workup this year.   Results for orders placed during the hospital encounter of 08/15/24    Echo    Interpretation Summary    Left Ventricle: The left ventricle is normal in size. Normal wall thickness. There is normal systolic function. Biplane (2D) method of discs ejection fraction is 62%. There is normal diastolic function.    Right Ventricle: Normal right ventricular cavity size. Wall thickness is normal. Systolic function is normal.    Tricuspid Valve: There is mild regurgitation.    Pulmonary Artery: The estimated pulmonary artery systolic pressure is 39 mmHg.    IVC/SVC: Normal venous pressure at 3 mmHg.      Results for orders placed during the hospital encounter of 08/15/24    Nuclear Stress Test    Interpretation Summary    The ECG portion of the study is negative for ischemia.    The patient reported no chest pain during the stress test.    There were no arrhythmias during stress.    The exercise capacity was average.      No results found for this or any previous visit.      Please document below the medical necessity for continuous telemetry monitoring or discontinue the current order if appropriate.    Current rhythm from flowsheet:                Patient Active Problem List    Diagnosis Date Noted    Aortic atherosclerosis 07/10/2023     Asa and statin   asymptomatic       Benign prostatic hyperplasia with nocturia 07/27/2022     Follows with urology   stable      Family history of prostate cancer 07/27/2022 2022 brother diagnosed at 64 yo  Follows with urology       Chronic pain of right knee 02/02/2022     voltaren gel controls pain      Nuclear sclerosis of right eye 11/09/2020    Prediabetes 01/03/2020     Well controlled with weight loss      Bilateral hearing loss 01/03/2020     - evaluated by Audiology and pending hearing aids      PCT (porphyria cutanea tarda) 08/28/2019     Managed per derm       Essential hypertension 08/02/2018     Well controlled  Lisinopril/HCTZ 20/25  Asymptomatic       Non-rheumatic tricuspid valve insufficiency 04/11/2017     Mild tricuspid regurgitation. 08/2016. Echo   Follows with cards  asymptomatic       Coronary artery disease involving native coronary artery of native heart without angina pectoris 12/03/2014     Follows with cards  Statin and asa; max med therapy  Not able to tolerate BB 2/2 low heart rate       Pure hypercholesterolemia 10/14/2014     Well controlled   Lipitor 80   Of note LDL not to goal of under 70; may consider adding Zetia if continues to be elevated   Well tolerated       Mild intermittent asthma without complication 10/14/2014     PRN albuterol   only has issues with colds       GERD (gastroesophageal reflux disease) 10/14/2014     PPI PRN   Advised of long term use harm vs good  Vit D and Ca advised                       LAST HbA1c  Lab Results   Component Value Date    HGBA1C 5.6 07/05/2024       Lipid panel  Lab Results   Component Value Date    CHOL 108 (L) 03/06/2025    CHOL 137 07/05/2024    CHOL 136 07/03/2023     Lab Results   Component Value Date    HDL 48 03/06/2025    HDL 44 07/05/2024    HDL 45 07/03/2023     Lab Results   Component Value Date    LDLCALC 48.8 (L)  03/06/2025    LDLCALC 80.6 07/05/2024    LDLCALC 75.4 07/03/2023     Lab Results   Component Value Date    TRIG 56 03/06/2025    TRIG 62 07/05/2024    TRIG 78 07/03/2023     Lab Results   Component Value Date    CHOLHDL 44.4 03/06/2025    CHOLHDL 32.1 07/05/2024    CHOLHDL 33.1 07/03/2023            Review of Systems   Constitutional: Negative for chills and fever.   HENT:  Negative for hearing loss and nosebleeds.    Eyes:  Negative for blurred vision.   Cardiovascular:  Negative for chest pain, leg swelling and palpitations.   Respiratory:  Negative for hemoptysis and shortness of breath.    Hematologic/Lymphatic: Negative for bleeding problem.   Skin:  Negative for itching.   Musculoskeletal:  Negative for falls.   Gastrointestinal:  Negative for abdominal pain and hematochezia.   Genitourinary:  Negative for hematuria.   Neurological:  Negative for dizziness and loss of balance.   Psychiatric/Behavioral:  Negative for altered mental status and depression.        Objective:   Physical Exam  Constitutional:       Appearance: He is well-developed.   HENT:      Head: Normocephalic and atraumatic.   Eyes:      Conjunctiva/sclera: Conjunctivae normal.   Neck:      Vascular: No carotid bruit or JVD.   Cardiovascular:      Rate and Rhythm: Normal rate and regular rhythm.      Pulses:           Carotid pulses are 2+ on the right side and 2+ on the left side.       Radial pulses are 2+ on the right side and 2+ on the left side.      Heart sounds: Normal heart sounds. No murmur heard.     No friction rub. No gallop.   Pulmonary:      Effort: Pulmonary effort is normal. No respiratory distress.      Breath sounds: Normal breath sounds. No stridor. No wheezing.   Musculoskeletal:      Cervical back: Neck supple.   Skin:     General: Skin is warm and dry.   Neurological:      Mental Status: He is alert and oriented to person, place, and time.   Psychiatric:         Behavior: Behavior normal.         Assessment:     1.  Hyperlipidemia, unspecified hyperlipidemia type    2. Gastroesophageal reflux disease, unspecified whether esophagitis present    3. Aortic atherosclerosis    4. Pure hypercholesterolemia    5. Coronary artery disease involving native coronary artery of native heart without angina pectoris    6. Hypertension, unspecified type        Plan:     -  physically very active without any symptoms concerning for possible angina.  We will continue risk factor modification.  Repeat lipid panel in 6 months.  Previously LDL of 80 continue Lipitor 80 mg and Zetia 10 mg daily. Repeat lipid panel shows LDL of 48.  - Echocardiogram with normal systolic and diastolic function of the left ventricle.  RVSP of 39 mm Hg.  Euvolemic on examination.  Continue lisinopril/HCTZ.  -   Stress test negative for any reversible perfusion defect.  In the absence of any classical angina symptoms holding off of beta blocker therapy at this point.  -  Follow up in 1 year.        Pertinent cardiac images and EKG reviewed independently.    Continue with current medical plan and lifestyle changes.  Return sooner for concerns or questions. If symptoms persist go to the ED  I have reviewed all pertinent data including patient's medical history in detail and updated the computerized patient record.     No orders of the defined types were placed in this encounter.      Follow up as scheduled.     He expressed verbal understanding and agreed with the plan    Patient's Medications   New Prescriptions    No medications on file   Previous Medications    ALBUTEROL (PROVENTIL/VENTOLIN HFA) 90 MCG/ACTUATION INHALER    Inhale 2 puffs into the lungs every 6 (six) hours as needed for Wheezing.    ASPIRIN 81 MG CHEW    Take 1 tablet (81 mg total) by mouth once daily.    ATORVASTATIN (LIPITOR) 80 MG TABLET    Take 1 tablet (80 mg total) by mouth every evening.    EZETIMIBE (ZETIA) 10 MG TABLET    Take 1 tablet (10 mg total) by mouth once daily.     LISINOPRIL-HYDROCHLOROTHIAZIDE (PRINZIDE,ZESTORETIC) 20-25 MG TAB    Take 1 tablet by mouth once daily.    METHYLPREDNISOLONE (MEDROL DOSEPACK) 4 MG TABLET    use as directed    OMEPRAZOLE (PRILOSEC) 20 MG CAPSULE    Take 1 capsule (20 mg total) by mouth once daily.    ONDANSETRON (ZOFRAN-ODT) 4 MG TBDL    Dissolve 1 tablet (4 mg total) on the tongue every 6 hours as needed (nausea/vomiting).    OXYCODONE-ACETAMINOPHEN (PERCOCET) 5-325 MG PER TABLET    Take 1 tablet by mouth every 6 (six) hours as needed for Pain (refractory to over the counter pain medications). Note this medication contains tylenol/acetaminophen. Do not exceed >3000mg in 24hr period of tylenol.    SODIUM CHLORIDE (SALINE NASAL) 0.65 % NASAL SPRAY    2 sprays by Nasal route every 4 (four) hours. Every 4 hours while awake (starting postop day 1) apply to bilateral nasal cavities    TRIAMCINOLONE (NASACORT) 55 MCG NASAL INHALER    2 sprays by Nasal route 2 (two) times daily.   Modified Medications    No medications on file   Discontinued Medications    No medications on file        Nilson Pulido M.D

## 2025-04-17 ENCOUNTER — PATIENT MESSAGE (OUTPATIENT)
Dept: GASTROENTEROLOGY | Facility: CLINIC | Age: 65
End: 2025-04-17
Payer: MEDICARE

## 2025-04-21 ENCOUNTER — HOSPITAL ENCOUNTER (OUTPATIENT)
Facility: OTHER | Age: 65
Discharge: HOME | End: 2025-04-21
Attending: ANESTHESIOLOGY | Admitting: ANESTHESIOLOGY
Payer: MEDICARE

## 2025-04-21 VITALS
TEMPERATURE: 99 F | BODY MASS INDEX: 26.06 KG/M2 | DIASTOLIC BLOOD PRESSURE: 80 MMHG | WEIGHT: 138 LBS | SYSTOLIC BLOOD PRESSURE: 149 MMHG | OXYGEN SATURATION: 93 % | RESPIRATION RATE: 18 BRPM | HEIGHT: 61 IN | HEART RATE: 77 BPM

## 2025-04-21 DIAGNOSIS — G89.29 CHRONIC PAIN: ICD-10-CM

## 2025-04-21 DIAGNOSIS — M54.16 LUMBAR RADICULOPATHY: Primary | ICD-10-CM

## 2025-04-21 PROCEDURE — 25500020 PHARM REV CODE 255: Performed by: ANESTHESIOLOGY

## 2025-04-21 PROCEDURE — 64483 NJX AA&/STRD TFRM EPI L/S 1: CPT | Mod: 50 | Performed by: ANESTHESIOLOGY

## 2025-04-21 PROCEDURE — 64483 NJX AA&/STRD TFRM EPI L/S 1: CPT | Mod: 50,,, | Performed by: ANESTHESIOLOGY

## 2025-04-21 PROCEDURE — 63600175 PHARM REV CODE 636 W HCPCS: Performed by: ANESTHESIOLOGY

## 2025-04-21 RX ORDER — FENTANYL CITRATE 50 UG/ML
INJECTION, SOLUTION INTRAMUSCULAR; INTRAVENOUS
Status: DISCONTINUED | OUTPATIENT
Start: 2025-04-21 | End: 2025-04-21 | Stop reason: HOSPADM

## 2025-04-21 RX ORDER — LIDOCAINE HYDROCHLORIDE 10 MG/ML
INJECTION, SOLUTION EPIDURAL; INFILTRATION; INTRACAUDAL; PERINEURAL
Status: DISCONTINUED | OUTPATIENT
Start: 2025-04-21 | End: 2025-04-21 | Stop reason: HOSPADM

## 2025-04-21 RX ORDER — DIPHENHYDRAMINE HYDROCHLORIDE 50 MG/ML
25 INJECTION, SOLUTION INTRAMUSCULAR; INTRAVENOUS ONCE
Status: COMPLETED | OUTPATIENT
Start: 2025-04-21 | End: 2025-04-21

## 2025-04-21 RX ORDER — MIDAZOLAM HYDROCHLORIDE 1 MG/ML
INJECTION INTRAMUSCULAR; INTRAVENOUS
Status: DISCONTINUED | OUTPATIENT
Start: 2025-04-21 | End: 2025-04-21 | Stop reason: HOSPADM

## 2025-04-21 RX ORDER — LIDOCAINE HYDROCHLORIDE 20 MG/ML
INJECTION, SOLUTION INFILTRATION; PERINEURAL
Status: DISCONTINUED | OUTPATIENT
Start: 2025-04-21 | End: 2025-04-21 | Stop reason: HOSPADM

## 2025-04-21 RX ORDER — SODIUM CHLORIDE 9 MG/ML
INJECTION, SOLUTION INTRAVENOUS CONTINUOUS
Status: DISCONTINUED | OUTPATIENT
Start: 2025-04-21 | End: 2025-04-21 | Stop reason: HOSPADM

## 2025-04-21 RX ORDER — DEXAMETHASONE SODIUM PHOSPHATE 10 MG/ML
INJECTION, SOLUTION INTRA-ARTICULAR; INTRALESIONAL; INTRAMUSCULAR; INTRAVENOUS; SOFT TISSUE
Status: DISCONTINUED | OUTPATIENT
Start: 2025-04-21 | End: 2025-04-21 | Stop reason: HOSPADM

## 2025-04-21 RX ADMIN — DIPHENHYDRAMINE HYDROCHLORIDE 25 MG: 50 INJECTION INTRAMUSCULAR; INTRAVENOUS at 10:04

## 2025-04-21 NOTE — DISCHARGE SUMMARY
"Discharge Note  Short Stay      SUMMARY     Admit Date: 4/21/2025    Attending Physician: Tj Mayfield MD    Discharge Physician: Tj Mayfield MD      Discharge Date: 4/21/2025 10:56 AM    Procedure(s) (LRB):  LUMBAR TRANSFORAMINAL BILATERAL L2/3 (Bilateral)    Final Diagnosis: Lumbar radiculopathy [M54.16]    Disposition: Home or self care    Patient Instructions:   Current Discharge Medication List        CONTINUE these medications which have NOT CHANGED    Details   albuterol (PROVENTIL/VENTOLIN HFA) 90 mcg/actuation inhaler Inhale 2 puffs into the lungs every 6 (six) hours as needed for Wheezing. Rescue  Qty: 20.1 g, Refills: 11    Associated Diagnoses: Mild intermittent asthma without complication; Sinusitis, unspecified chronicity, unspecified location      albuterol-ipratropium (DUO-NEB) 2.5 mg-0.5 mg/3 mL nebulizer solution Take 3 mLs by nebulization every 6 (six) hours as needed for Wheezing. Rescue  Qty: 90 mL, Refills: 3    Associated Diagnoses: Moderate persistent asthma with acute exacerbation; Bronchitis      BD ULTRA-FINE JAIME PEN NEEDLE 32 gauge x 5/32" Ndle For Forteo - inject daily  Qty: 100 each, Refills: 3    Associated Diagnoses: Steroid-induced osteoporosis      budesonide-formoterol 160-4.5 mcg (SYMBICORT) 160-4.5 mcg/actuation HFAA Inhale 2 puffs into the lungs every 12 (twelve) hours.  Qty: 30.6 g, Refills: 3    Associated Diagnoses: Chronic asthma, mild intermittent, uncomplicated      calcium citrate-vitamin D3 315-200 mg (CITRACAL+D) 315 mg-5 mcg (200 unit) per tablet Take 1 tablet by mouth 2 (two) times daily.       cefdinir (OMNICEF) 300 MG capsule Take 1 capsule by mouth every 12 (twelve) hours  Qty: 60 capsule, Refills: 1      cycloSPORINE (RESTASIS) 0.05 % ophthalmic emulsion Place 1 drop into both eyes 2 (two) times daily.  Qty: 180 each, Refills: 3    Associated Diagnoses: Bilateral dry eyes      diclofenac sodium (VOLTAREN) 1 % Gel APPLY 2 GRAMS TOPICALLY 4 (FOUR) TIMES DAILY AS " NEEDED.  Qty: 300 g, Refills: 2    Associated Diagnoses: Osteoarthritis, unspecified osteoarthritis type, unspecified site      fluconazole (DIFLUCAN) 150 MG Tab Take 150 mg by mouth as needed.      INV PROPULSID 10 MG Take 2 tablets (20 mg) by mouth 4 (four) times daily. FOR INVESTIGATIONAL USE ONLY. Protocol [id number] Subject ID: [id number].  Qty: 1440 each, Refills: 0    Associated Diagnoses: Patient in clinical research study; Gastroparesis      leflunomide (ARAVA) 20 MG Tab Take 1 tablet (20 mg total) by mouth once daily.  Qty: 90 tablet, Refills: 0    Associated Diagnoses: Rheumatoid arthritis      lifitegrast (XIIDRA) 5 % Dpet INSTILL ONE DROP INTO BOTH EYES TWICE A DAY  Qty: 60 mL, Refills: 10    Associated Diagnoses: Bilateral dry eyes      magic mouthwash diphen/antac/lidoc rinse mouth with 5 ml for 30 seconds and spit out, twice daily  Qty: 150 mL, Refills: 2      methenamine (HIPREX) 1 gram Tab Take 1 tablet (1 g total) by mouth 2 (two) times daily.  Qty: 60 tablet, Refills: 11    Associated Diagnoses: History of recurrent UTIs      mirabegron (MYRBETRIQ) 25 mg Tb24 ER tablet Take 1 tablet (25 mg total) by mouth once daily.  Qty: 30 tablet, Refills: 11    Associated Diagnoses: Urinary urgency; Urge urinary incontinence      montelukast (SINGULAIR) 10 mg tablet Take 1 tablet (10 mg total) by mouth every evening.  Qty: 90 tablet, Refills: 3    Associated Diagnoses: Perennial allergic rhinitis      morphine (MS CONTIN) 15 MG 12 hr tablet Take 1 tablet (15 mg total) by mouth 3 (three) times daily.  Qty: 60 tablet, Refills: 0    Comments: Quantity prescribed more than 7 day supply? Yes, quantity medically necessary  Associated Diagnoses: Chronic pain disorder      naloxone (NARCAN) 4 mg/actuation Spry 4mg by nasal route as needed for opioid overdose; may repeat every 2-3 minutes in alternating nostrils until medical help arrives. Call 911  Qty: 2 each, Refills: 11    Associated Diagnoses: Spinal stenosis of  lumbar region with neurogenic claudication; Post laminectomy syndrome; Osteonecrosis of mandible      naproxen (NAPROSYN) 500 MG tablet TAKE 1 TABLET BY MOUTH TWICE A DAY AS NEEDED  Qty: 180 tablet, Refills: 0    Associated Diagnoses: Osteoarthritis, unspecified osteoarthritis type, unspecified site      omeprazole-sodium bicarbonate (ZEGERID) 40-1.1 mg-gram per capsule TAKE 1 CAPSULE BY MOUTH EVERY DAY IN THE MORNING  Qty: 90 capsule, Refills: 3      !! pantoprazole (PROTONIX) 40 MG tablet Take 1 tablet (40 mg total) by mouth 2 (two) times daily.  Qty: 180 tablet, Refills: 3      !! pantoprazole (PROTONIX) 40 MG tablet Take 1 tablet (40 mg total) by mouth 2 (two) times daily.  Qty: 60 tablet, Refills: 3      pentoxifylline (TRENTAL) 400 mg TbSR Take 1 tablet (400 mg total) by mouth 2 (two) times a day.  Qty: 180 tablet, Refills: 0      potassium chloride SA (K-DUR,KLOR-CON) 20 MEQ tablet Take 1 tablet (20 mEq total) by mouth once daily.  Qty: 90 tablet, Refills: 3    Associated Diagnoses: Hypokalemia      predniSONE (DELTASONE) 1 MG tablet Take 1 tablet (1 mg total) by mouth once daily.  Qty: 90 tablet, Refills: 1      pregabalin (LYRICA) 150 MG capsule Take 1 capsule (150 mg total) by mouth 3 (three) times daily.  Qty: 270 capsule, Refills: 3    Associated Diagnoses: Fibromyalgia      promethazine (PHENERGAN) 25 MG tablet Take 1 tablet (25 mg total) by mouth every 6 (six) hours as needed for Nausea.  Qty: 90 tablet, Refills: 5    Comments: Bedside delivery [place] surgery 1/11/23 with Dr. Finnegan  Associated Diagnoses: Nausea      rosuvastatin (CRESTOR) 20 MG tablet TAKE 1 TABLET BY MOUTH EVERY DAY IN THE EVENING  Qty: 90 tablet, Refills: 3    Associated Diagnoses: Atherosclerotic heart disease of native coronary artery without angina pectoris      sarilumab (KEVZARA) 200 mg/1.14 mL Syrg Inject 1.14 mL (200 mg total) into the skin every 14 (fourteen) days.  Qty: 2.28 mL, Refills: 2    Associated Diagnoses:  Rheumatoid arthritis, involving unspecified site, unspecified whether rheumatoid factor present      !! senna-docusate 8.6-50 mg (SENNA PLUS) 8.6-50 mg per tablet Take 1 tablet by mouth daily  Qty: 30 tablet, Refills: 1      !! STIMULANT LAXATIVE PLUS 8.6-50 mg per tablet Take 1 tablet by mouth.      sucralfate (CARAFATE) 1 gram tablet TAKE 1 TABLET BY MOUTH FOUR TIMES A DAY  Qty: 360 tablet, Refills: 2    Associated Diagnoses: Gastroparesis      teriparatide (FORTEO) 20 mcg/dose (600mcg/2.4mL) PnIj Inject 0.08 mLs (20 mcg total) into the skin once daily.  Qty: 2.4 mL, Refills: 11    Associated Diagnoses: Steroid-induced osteoporosis      tiZANidine (ZANAFLEX) 4 MG tablet Take 1 tablet (4 mg total) by mouth every 6 (six) hours as needed (for muscle spasms).  Qty: 120 tablet, Refills: 1    Associated Diagnoses: Piriformis syndrome of both sides; Myofascial pain      triamcinolone (NASACORT) 55 mcg nasal inhaler 2 sprays by Nasal route once daily.  Qty: 16.9 mL, Refills: 5    Associated Diagnoses: Chronic rhinitis      vitamin E 1000 UNIT capsule Take 1,000 Units by mouth.       !! - Potential duplicate medications found. Please discuss with provider.              Discharge Diagnosis: Lumbar radiculopathy [M54.16]  Condition on Discharge: Stable with no complications to procedure   Diet on Discharge: Same as before.  Activity: as per instruction sheet.  Discharge to: Home with a responsible adult.  Follow up: 2-4 weeks       Please call my office or pager at [phone] if experienced any weakness or loss of sensation, fever > 101.5, pain uncontrolled with oral medications, persistent nausea/vomiting/or diarrhea, redness or drainage from the incisions, or any other worrisome concerns. If physician on call was not reached or could not communicate with our office for any reason please go to the nearest emergency department     Luis Cuevas MD

## 2025-04-21 NOTE — OP NOTE
Lumbar Transforaminal Epidural Steroid Injection under Fluoroscopic Guidance    The procedure, risks, benefits, and options were discussed with the patient. There are no contraindications to the procedure. The patent expressed understanding and agreed to the procedure. Informed written consent was obtained prior to the start of the procedure and can be found in the patient's chart.    PATIENT NAME: Joyce Peterson   MRN: [id number]     DATE OF PROCEDURE: 04/21/2025    PROCEDURE:  Bilateral  L2/3 Lumbar Transforaminal Epidural Steroid Injection under Fluoroscopic Guidance    PRE-OP DIAGNOSIS: Lumbar radiculopathy [M54.16] Lumbar radiculopathy [M54.16]    POST-OP DIAGNOSIS: Same    PHYSICIAN: Tj Mayfield MD    ASSISTANTS: Luis Cuevas MD  [facility]      MEDICATIONS INJECTED: Preservative-free Decadron 10mg with 5cc of Lidocaine 1% MPF     LOCAL ANESTHETIC INJECTED: Xylocaine 2%     SEDATION: Versed 4mg and Fentanyl 25mcg                                                                                                                                                                                     Conscious sedation ordered by M.D. Patient re-evaluation prior to administration of conscious sedation. No changes noted in patient's status from initial evaluation. The patient's vital signs were monitored by RN and patient remained hemodynamically stable throughout the procedure.    Event Time In   Sedation Start 1056   Sedation End 1103       ESTIMATED BLOOD LOSS: None    COMPLICATIONS: None    TECHNIQUE: Time-out was performed to identify the patient and procedure to be performed. With the patient laying in a prone position, the surgical area was prepped and draped in the usual sterile fashion using ChloraPrep and a fenestrated drape.The levels were determined under fluoroscopy guidance. Skin anesthesia was achieved by injecting Lidocaine 2% over the injection sites. The transforaminal spaces were then  approached with a 25 gauge, 3.5 inch spinal quinke needle that was introduced under fluoroscopic guidance in the AP and Lateral views. Once the needle tip was in the area of the transforaminal space, and there was no blood, CSF or paraesthesias, contrast dye Omnipaque (300mg/mL) was injected to confirm placement and there was no vascular runoff. Fluoroscopic imaging in the AP and lateral views revealed a clear outline of the spinal nerve with proximal spread of agent through the neural foramen into the epidural space. 4 mL of the medication mixture listed above was injected slowly at each site. Displacement of the radio opaque contrast after injection of the medication confirmed that the medication went into the area of the transforaminal spaces. The needles were removed and bleeding was nil. A sterile dressing was applied. No specimens collected. The patient tolerated the procedure well.     PRE-PROCEDURE PAIN SCORE: 9/10    POST-PROCEDURE PAIN SCORE: 3/10    The patient was monitored after the procedure in the recovery area. They were given post-procedure and discharge instructions to follow at home. The patient was discharged in a stable condition.    Luis uCevas MD    I reviewed and edited the fellow's note. I conducted my own interview and physical examination. I agree with the findings. I was present and supervising all critical portions of the procedure.

## 2025-04-21 NOTE — H&P
HPI  Patient presenting for Procedure(s) (LRB):  LUMBAR TRANSFORAMINAL BILATERAL L3/4 (Bilateral)     Patient on Anti-coagulation No    No health changes since previous encounter    Past Medical History:   Diagnosis Date    Acid reflux     Allergy     Anemia     Asthma     Coronary artery disease     CS (cervical spondylosis) 03/08/2013    Degenerative disc disease     Degenerative joint disease of hindfoot, left 6/28/2022    Dry eyes     Dry mouth     Gastroparesis     History of methotrexate therapy 01/19/2022    Hyperlipidemia     Lateral meniscus derangement 04/06/2016    Lobular carcinoma in situ     Lumbar spondylosis 03/08/2013    Osteoarthritis     Osteoporosis     Pes planovalgus, acquired, left 6/28/2022    Rheumatoid arthritis(714.0)     Rupture of left triceps tendon 10/17/2018    Umbilical hernia 08/13/2015     Past Surgical History:   Procedure Laterality Date    BACK SURGERY  06/12/2024    revision of prior back surgery on 07/14/2023    BREAST BIOPSY Left 01/29/2002    core bx    CARPAL TUNNEL RELEASE Right 05/2017    CHOLECYSTECTOMY  2004    COLONOSCOPY      10/11    COLONOSCOPY N/A 06/29/2017    Procedure: COLONOSCOPY;  Surgeon: López Moore MD;  Location: [facility] ENDO (4TH FLR);  Service: Endoscopy;  Laterality: N/A;    EPIDURAL STEROID INJECTION N/A 11/18/2021    Procedure: INJECTION, STEROID, EPIDURAL, L5-S1 IL NEED CONSENT;  Surgeon: Tj Mayfield MD;  Location: [facility] PAIN MGT;  Service: Pain Management;  Laterality: N/A;    EPIDURAL STEROID INJECTION N/A 09/29/2022    Procedure: LUMBAR L3/L4 IL MADELINE CONTRAST;  Surgeon: Tj Mayfield MD;  Location: [facility] PAIN MGT;  Service: Pain Management;  Laterality: N/A;    EPIDURAL STEROID INJECTION N/A 12/12/2022    Procedure: INJECTION, STEROID, EPIDURAL L5/S1 IL CONTRAST;  Surgeon: Tj Mayfield MD;  Location: [facility] PAIN MGT;  Service: Pain Management;  Laterality: N/A;    EPIDURAL STEROID INJECTION N/A 04/06/2023    Procedure: INJECTION, STEROID, EPIDURAL  L5/S1;  Surgeon: Tj Mayfield MD;  Location: [facility] PAIN MGT;  Service: Pain Management;  Laterality: N/A;    FOOT ARTHRODESIS Left 01/11/2023    Procedure: FUSION, FOOT;  Surgeon: Ariella Finnegan MD;  Location: [facility] OR;  Service: Orthopedics;  Laterality: Left;  nimbus    FUSION, SPINE, LUMBAR, TLIF, POSTERIOR APPROACH, USING PEDICLE SCREW N/A 06/22/2023    Procedure: FUSION, SPINE, LUMBAR, TLIF, POSTERIOR APPROACH, USING PEDICLE SCREW L4/5 spinewave SNS:SSEP/EMG;  Surgeon: Jh Mosqueda MD;  [facility];  Service: Neurosurgery;  Laterality: N/A;    HARVESTING OF BONE GRAFT Left 01/11/2023    Procedure: SURGICAL PROCUREMENT, BONE GRAFT;  Surgeon: Ariella Finnegan MD;  Location: [facility] OR;  Service: Orthopedics;  Laterality: Left;    INJECTION OF ANESTHETIC AGENT AROUND NERVE Bilateral 08/23/2021    Procedure: BLOCK, NERVE, MEDIAL BRANCH L3,L4,L5;  Surgeon: jT Mayfield MD;  Location: [facility] PAIN MGT;  Service: Pain Management;  Laterality: Bilateral;  1 of 2    INJECTION OF ANESTHETIC AGENT AROUND NERVE Bilateral 08/26/2021    Procedure: BLOCK, NERVE, MEDIAL BRANCH L3,L4,L5;  Surgeon: Tj Mayfield MD;  Location: [facility] PAIN MGT;  Service: Pain Management;  Laterality: Bilateral;  2 of 2    INJECTION OF ANESTHETIC AGENT AROUND NERVE Left 07/07/2022    Procedure: BLOCK, NERVE, LEFT OBTURATOR AND FEMORAL;  Surgeon: Tj Mayfield MD;  Location: [facility] PAIN MGT;  Service: Pain Management;  Laterality: Left;    INJECTION OF FACET JOINT Bilateral 03/12/2020    Procedure: FACET JOINT INJECTION (LUMBAR BLOCK) BILATERAL L4-5 AND L5-S1 DIRECT REFERRAL;  Surgeon: Tj Mayfield MD;  Location: [facility] PAIN MGT;  Service: Pain Management;  Laterality: Bilateral;  NEEDS CONSENT    INJECTION OF FACET JOINT Bilateral 03/29/2021    Procedure: INJECTION, FACET JOINT L3/4, L4/5, L5/S1;  Surgeon: Tj Mayfield MD;  Location: [facility] PAIN MGT;  Service: Pain Management;  Laterality: Bilateral;    INJECTION OF JOINT Right 07/30/2020     Procedure: INJECTION, JOINT, RIGHT HIP Interarticular under flouro;  Surgeon: Tj Mayfield MD;  Location: [facility] PAIN MGT;  Service: Pain Management;  Laterality: Right;  INJECTION, JOINT, RIGHT HIP Interarticular under flouro    INJECTION OF JOINT Right 02/21/2022    Procedure: Injection, Joint RIGHT ISCHIAL BURSA;  Surgeon: Tj Mayfield MD;  Location: [facility] PAIN MGT;  Service: Pain Management;  Laterality: Right;    INTRAMEDULLARY RODDING OF FEMUR Left 05/21/2019    Procedure: INSERTION, INTRAMEDULLARY ARIEL, FEMUR;  Surgeon: López Duff MD;  Location: [facility] 2ND FLR;  Service: Orthopedics;  Laterality: Left;    LENGTHENING OF TENDON OF LOWER EXTREMITY Left 01/11/2023    Procedure: LENGTHENING, TENDON, LOWER EXTREMITY;  Surgeon: Ariella Finnegan MD;  Location: [facility] OR;  Service: Orthopedics;  Laterality: Left;    MAGNETIC RESONANCE IMAGING N/A 05/29/2023    Procedure: MRI (Magnetic Resonance Imagine);  Surgeon: Sujey Surgeon;  Location: [facility] SUJEY;  Service: Anesthesiology;  Laterality: N/A;    MAGNETIC RESONANCE IMAGING N/A 01/10/2024    Procedure: MRI (Magnetic Resonance Imagine);  Surgeon: Sujey Robin;  Location: [facility];  Service: Anesthesiology;  Laterality: N/A;    RADIOFREQUENCY ABLATION Left 09/20/2021    Procedure: RADIOFREQUENCY ABLATION left L3,4,5 RFA  1 of 2  CONSENT NEEDED;  Surgeon: Tj Mayfield MD;  Location: [facility] PAIN MGT;  Service: Pain Management;  Laterality: Left;    RADIOFREQUENCY ABLATION Right 10/04/2021    Procedure: RADIOFREQUENCY ABLATION  right L3,4,5 RFA   2 of 2  CONSENT NEEDED;  Surgeon: Tj Mayfield MD;  Location: [facility] PAIN MGT;  Service: Pain Management;  Laterality: Right;    RADIOFREQUENCY ABLATION Left 04/21/2022    Procedure: Radiofrequency Ablation LEFT L3,L4,L5;  Surgeon: Tj Mayfield MD;  Location: [facility] PAIN MGT;  Service: Pain Management;  Laterality: Left;    RADIOFREQUENCY ABLATION Right 05/12/2022    Procedure: Radiofrequency Ablation RIGHT L3,L4,L5;  Surgeon: Tj  MD Tran;  Location: [facility] PAIN MGT;  Service: Pain Management;  Laterality: Right;    RADIOFREQUENCY ABLATION Left 07/25/2022    Procedure: Radiofrequency Ablation Left Femoral & Obturator;  Surgeon: Tj Mayfield MD;  Location: [facility] PAIN MGT;  Service: Pain Management;  Laterality: Left;    REPAIR OF TRICEPS TENDON Left 10/17/2018    Procedure: REPAIR, TENDON, TRICEPS left elbow;  Surgeon: Staci Yarbrough MD;  [facility];  Service: Orthopedics;  Laterality: Left;  Anesthesia: General and regional. PRONE, k-wire , hand pan 1 and pan 2, CALL ARTHREX/Tamera notified 10-12 LO    TONSILLECTOMY      TRANSFORAMINAL EPIDURAL INJECTION OF STEROID Right 08/31/2020    Procedure: INJECTION, STEROID, EPIDURAL, TRANSFORAMINAL,  APPROACH, L3-L4 and L4-L5 need consent;  Surgeon: Tj Mayfield MD;  Location: [facility] PAIN MGT;  Service: Pain Management;  Laterality: Right;    TRANSFORAMINAL EPIDURAL INJECTION OF STEROID Bilateral 07/23/2021    Procedure: INJECTION, STEROID, EPIDURAL, TRANSFORAMINAL APPROACH L4/5;  Surgeon: Tj Mayfield MD;  Location: [facility] PAIN MGT;  Service: Pain Management;  Laterality: Bilateral;    TRANSFORAMINAL EPIDURAL INJECTION OF STEROID Bilateral 09/25/2023    Procedure: LUMBAR TRANSFORAMINAL BILATERAL  L2/3 DIRECT REFERRAL;  Surgeon: Tj Mayfield MD;  Location: [facility] PAIN MGT;  Service: Pain Management;  Laterality: Bilateral;    TRANSFORAMINAL EPIDURAL INJECTION OF STEROID Left 10/18/2023    Procedure: LUMBAR TRANSFORAMINAL LEFT L3/4 AND L4/5 * CLEARANCE IN CHART*;  Surgeon: Tj Mayfield MD;  Location: [facility] PAIN MGT;  Service: Pain Management;  Laterality: Left;    TRIGGER POINT INJECTION N/A 07/23/2021    Procedure: INJECTION, TRIGGER POINT SCAPULAR;  Surgeon: Tj Mayfield MD;  Location: [facility] PAIN MGT;  Service: Pain Management;  Laterality: N/A;    TUBAL LIGATION  2003    UPPER GASTROINTESTINAL ENDOSCOPY      10/11    uterine ablation  2003     Review of patient's  "allergies indicates:   Allergen Reactions    Actemra [tocilizumab] Other (See Comments)     Severe dizziness    Codeine Nausea And Vomiting and Hives    Gold au 198 Hives and Rash    Hydroxychloroquine Other (See Comments)     Can't remember the reaction      Iodinated contrast media Other (See Comments)     Other reaction(s): BURNING ALL OVER    Iodine Other (See Comments) and Hives     Other reaction(s): BURNING ALL OVER - Iodine dye - Not topical    Ondansetron Nausea And Vomiting    Sulfa (sulfonamide antibiotics) Other (See Comments)     Can't remember the reaction    Zofran [ondansetron hcl (pf)] Nausea And Vomiting     Pt reports that last time she received zofran she started vomiting again    Methotrexate analogues Nausea Only    Pneumococcal vaccine Other (See Comments)    Pneumovax 23 [pneumococcal 23-argensi ps vaccine] Other (See Comments)     "sick"    Methotrexate Nausea Only      No current facility-administered medications for this encounter.       PMHx, PSHx, Allergies, Medications reviewed in epic    ROS negative except pain complaints in HPI    OBJECTIVE:    LMP  (LMP Unknown)     PHYSICAL EXAMINATION:    GENERAL: Well appearing, in no acute distress, alert and oriented x3.  PSYCH:  Mood and affect appropriate.  SKIN: Skin color, texture, turgor normal, no rashes or lesions which will impact the procedure.  CV: RRR with palpation of the radial artery.  PULM: No evidence of respiratory difficulty, symmetric chest rise. Clear to auscultation.  NEURO: Cranial nerves grossly intact.    Plan:    Proceed with procedure as planned Procedure(s) (LRB):  LUMBAR TRANSFORAMINAL BILATERAL L3/4 (Bilateral)    Will Burnett  04/21/2025            "

## 2025-04-21 NOTE — DISCHARGE INSTRUCTIONS

## 2025-04-22 ENCOUNTER — PATIENT MESSAGE (OUTPATIENT)
Dept: PSYCHIATRY | Facility: CLINIC | Age: 65
End: 2025-04-22
Payer: MEDICARE

## 2025-04-22 ENCOUNTER — TELEPHONE (OUTPATIENT)
Dept: RHEUMATOLOGY | Facility: CLINIC | Age: 65
End: 2025-04-22
Payer: MEDICARE

## 2025-04-22 DIAGNOSIS — E78.5 HYPERLIPIDEMIA, UNSPECIFIED HYPERLIPIDEMIA TYPE: Primary | ICD-10-CM

## 2025-04-22 RX ORDER — ROSUVASTATIN CALCIUM 20 MG/1
TABLET, COATED ORAL
Qty: 90 TABLET | Refills: 3 | Status: SHIPPED | OUTPATIENT
Start: 2025-04-22

## 2025-04-23 ENCOUNTER — PATIENT MESSAGE (OUTPATIENT)
Dept: HEMATOLOGY/ONCOLOGY | Facility: CLINIC | Age: 65
End: 2025-04-23
Payer: MEDICARE

## 2025-04-25 ENCOUNTER — TELEPHONE (OUTPATIENT)
Dept: GASTROENTEROLOGY | Facility: CLINIC | Age: 65
End: 2025-04-25
Payer: MEDICARE

## 2025-04-25 NOTE — TELEPHONE ENCOUNTER
Study: [id number]: Limited Access Protocol for the Use of Oral Cisapride in the Treatment of  Refractory Gastroesophageal Reflux Disease and Other Gastrointestinal Motility Disorders  Sponsor: [employer]  : López Moore MD  IRB # [id number]  Subject ID# [id number]    I tried to return Ms Peterson's phone call about the Cisapride. I left her a detailed message to call me back at [phone] or on my cell number. This is my first attempt to reach her.

## 2025-04-28 ENCOUNTER — OFFICE VISIT (OUTPATIENT)
Dept: OPTOMETRY | Facility: CLINIC | Age: 65
End: 2025-04-28
Payer: MEDICARE

## 2025-04-28 DIAGNOSIS — H52.203 HYPEROPIA OF BOTH EYES WITH ASTIGMATISM AND PRESBYOPIA: ICD-10-CM

## 2025-04-28 DIAGNOSIS — H52.4 HYPEROPIA OF BOTH EYES WITH ASTIGMATISM AND PRESBYOPIA: ICD-10-CM

## 2025-04-28 DIAGNOSIS — K31.84 GASTROPARESIS: ICD-10-CM

## 2025-04-28 DIAGNOSIS — H52.03 HYPEROPIA OF BOTH EYES WITH ASTIGMATISM AND PRESBYOPIA: ICD-10-CM

## 2025-04-28 DIAGNOSIS — Z00.6 PATIENT IN CLINICAL RESEARCH STUDY: ICD-10-CM

## 2025-04-28 DIAGNOSIS — H04.123 BILATERAL DRY EYES: ICD-10-CM

## 2025-04-28 DIAGNOSIS — H53.10 SUBJECTIVE VISUAL DISTURBANCE: Primary | ICD-10-CM

## 2025-04-28 PROCEDURE — 99499 UNLISTED E&M SERVICE: CPT | Mod: S$GLB,,, | Performed by: OPTOMETRIST

## 2025-04-28 PROCEDURE — 99999 PR PBB SHADOW E&M-EST. PATIENT-LVL III: CPT | Mod: PBBFAC,,, | Performed by: OPTOMETRIST

## 2025-04-28 NOTE — PROGRESS NOTES
HPI    DLS: 12/6/2024    63 yo F presents today for dry eye follow + refraction.    Eye Meds: Xiidra BID OU, Restasis BID OU, Refresh and Serum Tears   throughout the day    POHx:  Bilateral dry eyes  Allergic conjunctivitis of both eyes  Nuclear sclerosis of both eyes    Pt has been dx'ed with rheumatoid arthritis and sjogrens    Last edited by Ivon Dawkins, OD on 4/28/2025  3:09 PM.            Assessment /Plan     For exam results, see Encounter Report.    Subjective visual disturbance    Bilateral dry eyes    Hyperopia of both eyes with astigmatism and presbyopia      MONITOR. ED PT ON ALL EXAM FINDINGS  RX FINAL SPECS   DISCUSSED DRY EYE THERAPIES; MONITOR. CONSIDER DRY EYE CONSULT PRN   RTC 1 YR//PRN FOR REE/DFE

## 2025-04-30 ENCOUNTER — CLINICAL SUPPORT (OUTPATIENT)
Dept: PSYCHIATRY | Facility: CLINIC | Age: 65
End: 2025-04-30
Payer: MEDICARE

## 2025-04-30 DIAGNOSIS — Z00.8 ENCOUNTER FOR PRE-SURGICAL PSYCHOLOGICAL ASSESSMENT: Primary | ICD-10-CM

## 2025-05-06 ENCOUNTER — DOCUMENTATION ONLY (OUTPATIENT)
Dept: PSYCHIATRY | Facility: CLINIC | Age: 65
End: 2025-05-06
Payer: MEDICARE

## 2025-05-06 ENCOUNTER — OFFICE VISIT (OUTPATIENT)
Dept: PSYCHIATRY | Facility: CLINIC | Age: 65
End: 2025-05-06
Payer: MEDICARE

## 2025-05-06 DIAGNOSIS — Z01.818 PREOPERATIVE EVALUATION TO RULE OUT SURGICAL CONTRAINDICATION: Primary | ICD-10-CM

## 2025-05-06 DIAGNOSIS — M96.1 POST LAMINECTOMY SYNDROME: ICD-10-CM

## 2025-05-06 DIAGNOSIS — G89.29 OTHER CHRONIC PAIN: ICD-10-CM

## 2025-05-06 NOTE — PROGRESS NOTES
PRESURGICAL PSYCHOLOGICAL EVALUATION - PAIN MANAGEMENT   Psychiatry Initial Visit (PhD)   Psychological Intake and Assessment      Site: [facility], Department of Psychiatry, Psychology Section   [facility] [address] [address]    The patient location is: home ([place])  The chief complaint leading to consultation is: presurgical eval    Visit type: audiovisual    Face to Face time with patient: 42  90 minutes of total time spent on the encounter, which includes face to face time and non-face to face time preparing to see the patient (eg, review of tests), Obtaining and/or reviewing separately obtained history, Documenting clinical information in the electronic or other health record, Independently interpreting results (not separately reported) and communicating results to the patient/family/caregiver, or Care coordination (not separately reported).         Each patient to whom he or she provides medical services by telemedicine is:  (1) informed of the relationship between the physician and patient and the respective role of any other health care provider with respect to management of the patient; and (2) notified that he or she may decline to receive medical services by telemedicine and may withdraw from such care at any time.    Notes:   CPT Codes:    64085 (1 hour): Psychiatric Diagnostic Evaluation   18411 (1 hour): Integration of patient data, interpretation of standardized test results and clinical data, clinical decision-making, treatment planning and report, and interactive feedback to the patient   25963 (30 minutes), 96560 (30 minutes x 2): Psychological test administration and scoring by technician, two or more tests, any method: [place] Multiphasic Personality Inventory - 3 (MMPI-3); Pain and Impairment Relationship Scale (PAIRS); Leonard Pain Catastrophizing Scale (PCS)         NAME:  Joyce Peterson    MRN: [id number]    : 1960       Date:  2025      Clinical status  "of patient: Outpatient   Met with: Patient   Referred by: Tj Mayfield MD       Chief complaint/reason for encounter: Psychological Evaluation prior to surgical implantation of a spinal cord stimulator (SCS) to address chronic pain.       Before this evaluation was initiated, the purposes and process of the assessment and the limits of confidentiality were discussed with the patient who expressed understanding of these issues and verbally consented to proceed with the evaluation.         HISTORY OF PRESENT ILLNESS:   Ms. Joyce Peterson is a 64 y.o.-year-old female referred for Psychological Evaluation prior to surgical implantation of a spinal cord stimulator (SCS) to address chronic pain. She has chronic pain in her lower back that radiates down both legs. Her pain has been present for the past 3 years. She has tried physical  therapy, medications, injections, surgery, ablations without receiving sufficient relief. Her activities are greatly limited. She reports she is unable to walk without assistance and can only tend to her bare basic needs.      Ms. Peterson is now interested in a trial of SCS. She has reviewed the educational materials and is familiar with the procedures involved. She understood risks of surgery including bleeding, infection, failure of surgery, misplaced hardware, migration of hardware, need for reoperation, etc. When asked about potential concerns regarding SCS, she indicated she doesn't have any. Her expectations regarding SCS include "anything would be helpful. Hopefully at least half of it gone." She is motivated to "get  back to my normal life, get up to do what I want to do." If SCS is not effective for her she noted she would not consider suicide as an option and would look forward to future treatment options. Her  will be available to help her during recovery after surgery.      When asked how pain affects her mood, Ms. Peterson reported, it really doesn't. I get a " little sad when I can't do things I used to do but I try to keep on smiling and pushing through. Regarding mental health history, she denied past psychiatric treatment and history. She does not report current psychiatric problems or major psychosocial stressors.     MEDICAL HISTORY:   Relevant Medical History:   Problem List[1]      Current Health Behaviors:   Compliant with medical regimens and appointments: Yes  Prescription medication misuse: No   Exercise: No   Adequate sleep: Yes  Adequate cognitive functioning: Yes     Current and Past Substance Use/Abuse:   Alcohol: Denied current use; denied history of abuse or dependency.    Drugs: Denied current use; denied history of abuse or dependency.   Tobacco: None.    Caffeine:  3 small cans of diet coke per day      Trauma History: denies     Psychosocial History and Current Social Situation:     Ms. Peterson was born and raised in [place], [place]. She described her childhood as average. She denied childhood trauma, abuse, and neglect. She completed 3 years of college. She is currently on disability due to rheumatoid arthritis. She denied  service.  She has been  to her  for 43 years. She has 2 children (ages 39 and 34). She currently lives with her . Adventism is important to her as a source of support.     Legal history: She denied history of arrests and convictions. He denied current involvement in litigation.   Access to guns: denies        PAST AND CURRENT MENTAL HEALTH:   Past Mental Health History:   Previous diagnosis: Denied  Inpatient treatment: Denied.   Prior substance abuse treatment: Denied.   Outpatient treatment: Denied.   Suicide attempt: Denied.   Non-suicidal self-injury: Denied.      Family Mental Health History:   Psychiatric illness: Denied  Substance abuse: Denied  Suicide: Denied     Current Mental Health Treatment:   Medications: Denied.   Psychotherapy: Denied.      Current Mental Health Symptoms:  "  Depression: Denied. She denied episodes of depressed mood or depression-related anhedonia, lack of motivation, lethargy, difficulty concentrating, feelings of worthlessness or guilt, hopelessness, appetite changes, or psychomotor changes.    Saray/Hypomania: Denied. She denied periods of elevated mood or abnormally increased energy or goal-directed activity.   Anxiety:    Generalized Anxiety: Denied. She denied experiencing excessive, exaggerated anxiety that was unmanageable.    Panic Disorder: Denied.   OCD: Denied.   Insomnia: Denied.   Thought dysfunction: Denied delusions, hallucinations.   Non-suicidal or Suicidal thoughts/behaviors: Denied.   Personality functioning: The patient does not display any personality characteristics which would be an impediment to pursuing SCS.      Current Stress Management:   Current psychosocial stressors: denies - "just this pain, everything else is great."  Report of coping skills: facetime with grandson, talk to   Support system: , sons and daughter-in-laws     PSYCHOLOGICAL ASSESSMENT/TESTING:   All tests were administered according to standardized procedures and were selected based on the reason for referral. Effort on all tests was satisfactory to produce valid results.      MMPI-3. The MMPI-3 provides an assessment of personality and psychopathology with specific evaluation of psychosocial risk factors associated with outcomes of SCS.   Ms. Peterson produced a valid and interpretable MMPI-3 profile, answering items relevantly based on content. Therefore, her responses should be considered a relatively accurate reflection of her current psychological functioning.  TEST RESULTS. Ms. Peterson's scores do not indicate any cognitive, somatic, emotional, thought, or behavioral dysfunction.   GROUP COMPARISONS. Compared to other SCS candidates, Ms. Peterson reports lower levels of stress, worry, and anxiety.    RISK ASSESSMENTS. The following likelihoods are " based on test results and research, and are correlational rather than causational. Her profile is not associated with any post-surgical risks.      PAIRS and PCS. The PAIRS and PCS reveal beliefs and attitudes related to pain that may impact outcomes of SCS. Elevated scores indicate the need to query the ability to cope with the pain experience, high levels of emotional distress when pain occurs, and a negative mental set and persistent attention brought to bear during the experience of pain.    The PAIRS indicated non-significant complications related to perceptions of impairment with a total score of 74 (a score over 75 is clinically significant). These results suggest a tendency toward negative beliefs and attitudes about the ability to function and enjoy life despite discomfort from pain, which is more likely to contribute to greater functional impairments.   The PCS indicated minimal catastrophizing of pain with a total score of 11, which is in the 28th percentile (a score over 30 is in the 75th percentile and is clinically significant). Her subscale scores indicated minimal Rumination (6th percentile), minimal Magnification (14th percentile), and minimal Helplessness (60th percentile). These results suggest a tendency toward elevated pain perception and a tendency to focus on one's pain, increased perception of the seriousness of pain, and pessimism toward one's ability to cope with the pain experience. Catastrophic thinking is a risk factor for chronicity.       FEEDBACK. Ms. Joyce Peterson was provided with test results, and offered the opportunity to respond to feedback and clarify results if needed.  She acknowledged the results as being largely consistent with her overall functioning and well-being.         MENTAL STATUS EXAM:   General appearance: appears stated age, neatly dressed, well groomed   Speech: normal rate and tone   Level of cooperation: cooperative   Thought processes: logical,  goal-directed   Mood: euthymic   Thought content: no illusions, no visual hallucinations, no auditory hallucinations, no delusions, no active or passive homicidal thoughts, no active or passive suicidal ideation, no obsessions, no compulsions, no violence   Affect: appropriate   Orientation: oriented to person, place, and date   Memory:   Recent memory: intact  Remote memory: - intact   Attention span and concentration: good  Fund of general knowledge: good  Judgment and insight: fair   Language: intact         SUMMARY AND RECOMMENDATIONS:   Ms. Joyce Peterson is a 64 y.o.-year-old female referred for presurgical psychological evaluation prior to surgical implantation of a spinal cord stimulator (SCS) to address chronic pain.  Test results revealed no significant psychiatric distress or other problems that would contraindicate surgery.      There are no indications of disabling psychopathology, substance use/abuse, cognitive problems, or disabilities that would prevent understanding and competence with medical treatment. There are no reports of major psychosocial stressors that would interfere with her engagement in treatment. There is no evidence of suicidality.   She exhibits high social stability and good social support. She has good coping strategies to deal with stress and the demands of surgery and recovery.   She has good knowledge about SCS, appropriate expectations for surgery and recovery, adequate understanding of possible risks of this treatment option, and a high willingness to sustain effort for lifestyle changes and health adaptations required. She reports adequate compliance with prior medical regimens.   Results from psychological assessment/testing revealed low risks. Based on research and recommendations regarding presurgical psychological screening for pain control procedures, her test results and reports are within the expected range to predict adequate outcomes and patient satisfaction.         Ms. Joyce Peterson is a suitable candidate from a psychological perspective.  There are no absolute psychological contraindications to SCS. There are no recommendations for psychological intervention at this time.      Diagnostic impressions:     ICD-10-CM ICD-9-CM   1. Preoperative evaluation to rule out surgical contraindication  Z01.818 V72.83   2. Post laminectomy syndrome  M96.1 722.80   3. Other chronic pain  G89.29 338.29          Plan: This report will be sent to the referring provider with impressions and recommendations. It will be the referring team's decision whether the patient proceeds with surgery. Services related to the presurgical psychological evaluation are now concluded.            Lena Brewer, PhD  Licensed Clinical Psychologist ([place]#[id number])  [facility]               [1]   Patient Active Problem List  Diagnosis    GERD (gastroesophageal reflux disease)    Gastroparesis    Steroid-induced osteoporosis    Thyroid nodule    Hyperlipidemia    Mild intermittent asthma without complication    Rheumatoid arthritis involving multiple sites    Iron deficiency anemia due to chronic blood loss    Sjogren's syndrome    Candidal esophagitis    Coronary artery disease involving native coronary artery of native heart without angina pectoris    Subclinical hyperthyroidism    Dry eyes    Ureterocele    Spinal stenosis, lumbar region without neurogenic claudication    History of methotrexate therapy    NAFLD (nonalcoholic fatty liver disease)    Chronic pain    Hypokalemia    Arthrodesis status    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

## 2025-05-06 NOTE — PROGRESS NOTES
Ms. Joyce Peterson is a suitable candidate from a psychological perspective.  There are no absolute psychological contraindications to SCS. There are no recommendations for psychological intervention at this time.

## 2025-05-07 ENCOUNTER — TELEPHONE (OUTPATIENT)
Dept: PAIN MEDICINE | Facility: CLINIC | Age: 65
End: 2025-05-07
Payer: MEDICARE

## 2025-05-07 ENCOUNTER — PATIENT MESSAGE (OUTPATIENT)
Dept: PAIN MEDICINE | Facility: CLINIC | Age: 65
End: 2025-05-07
Payer: MEDICARE

## 2025-05-07 DIAGNOSIS — G89.4 CHRONIC PAIN DISORDER: ICD-10-CM

## 2025-05-07 DIAGNOSIS — G89.4 CHRONIC PAIN DISORDER: Primary | ICD-10-CM

## 2025-05-07 RX ORDER — MORPHINE SULFATE 15 MG/1
15 TABLET, FILM COATED, EXTENDED RELEASE ORAL 3 TIMES DAILY
Qty: 60 TABLET | Refills: 0 | Status: SHIPPED | OUTPATIENT
Start: 2025-05-07 | End: 2025-05-07 | Stop reason: SDUPTHER

## 2025-05-07 RX ORDER — MORPHINE SULFATE 15 MG/1
15 TABLET, FILM COATED, EXTENDED RELEASE ORAL 3 TIMES DAILY
Qty: 90 TABLET | Refills: 0 | Status: SHIPPED | OUTPATIENT
Start: 2025-05-07

## 2025-05-07 NOTE — TELEPHONE ENCOUNTER
Patient requesting refill on: [facility]  Last office visit: 04.09.25   shows last refill on: 04.17.25  Patient does not have a pain contract on file with [facility] department  Patient last UDS: 03.11.25

## 2025-05-09 ENCOUNTER — PATIENT MESSAGE (OUTPATIENT)
Dept: FAMILY MEDICINE | Facility: CLINIC | Age: 65
End: 2025-05-09
Payer: MEDICARE

## 2025-05-09 ENCOUNTER — OFFICE VISIT (OUTPATIENT)
Dept: PAIN MEDICINE | Facility: CLINIC | Age: 65
End: 2025-05-09
Payer: MEDICARE

## 2025-05-09 VITALS
HEART RATE: 95 BPM | RESPIRATION RATE: 19 BRPM | SYSTOLIC BLOOD PRESSURE: 116 MMHG | WEIGHT: 138 LBS | HEIGHT: 61 IN | OXYGEN SATURATION: 100 % | BODY MASS INDEX: 26.06 KG/M2 | DIASTOLIC BLOOD PRESSURE: 69 MMHG

## 2025-05-09 DIAGNOSIS — M47.816 LUMBAR SPONDYLOSIS: ICD-10-CM

## 2025-05-09 DIAGNOSIS — M54.17 LUMBOSACRAL RADICULOPATHY: ICD-10-CM

## 2025-05-09 DIAGNOSIS — M96.1 LUMBAR POSTLAMINECTOMY SYNDROME: ICD-10-CM

## 2025-05-09 DIAGNOSIS — G89.4 CHRONIC PAIN SYNDROME: Primary | ICD-10-CM

## 2025-05-09 DIAGNOSIS — M54.16 LUMBAR RADICULOPATHY: ICD-10-CM

## 2025-05-09 DIAGNOSIS — G89.4 CHRONIC PAIN DISORDER: ICD-10-CM

## 2025-05-09 DIAGNOSIS — M54.17 LUMBOSACRAL RADICULOPATHY: Primary | ICD-10-CM

## 2025-05-09 DIAGNOSIS — Z79.891 ENCOUNTER FOR LONG-TERM OPIATE ANALGESIC USE: ICD-10-CM

## 2025-05-09 PROCEDURE — 99999 PR PBB SHADOW E&M-EST. PATIENT-LVL V: CPT | Mod: PBBFAC,,, | Performed by: NURSE PRACTITIONER

## 2025-05-09 NOTE — PROGRESS NOTES
Pain Follow up Note- Established Patient         Subjective:      Patient ID: Joyce Peterson is a 64 y.o. female.    Chief Complaint: Low-back Pain    Referred by: No ref. provider found     Interval History 5/9/2025:  She presents today for follow up of back pain. She is s/p bilateral L2/3 TF MADELINE on 4/21/25. She is reporting 80% relief for one week only. Her pain has returned to baseline. She recently had SCS psych clearance and was cleared. Dr. Mayfield spoke with her regarding SCS trial after she completes hyperbarics. 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 The pain is constant with intermittent nerve pain sensations. She experienced sudden, severe pain when adjusting position in a chair, describing it as if an ice pick went up her spine. She also reports skin sensitivity over the buttocks, describing it as tender and electric to touch. Pain has caused significant functional limitations. She reports being unable to function and not doing anything, with every minute of every day affected. She notes being exhausted by 3-4 PM daily due to pain. She has a history of multiple back surgeries, including a fusion from L2 to the pelvis. She denies current radiation of pain down her legs. Her pain today is 6/10      Interval History 4/9/2025:   Joyce Peterson is following up following up for her chronic lower back pain. Patient said her pain has worsened over the last month without no pain relief with her current pain medication Morphine 15 Q12 hours. The pain is worse with walking and standing for prolonged period of time. She said the pain radiates down both legs along the L3/4 dermatome.She is unable to walk without assistance and feels better with leaning forward for support and pain relief. Patient has lumbar CT and MRI without any acute changes in her hardware. Patient also continues to follow up with ID and Hyperbaric treatment for chronic jaw osteonecrosis. Her pain today is 8/10. She denied  any weight loss, night sweats, bowel incontinence or saddle anesthesia. She continues to follow up with [facility] and Rheumatology.      Interval History 3/11/2025:  The patient is here for follow up of lower back pain. She feels that her back pain has worsened over the past couple of months. She recently had an extended vacation to [place] and [place] and experienced significant back pain. She says that it felt like nerve pain. She had decreased Lyrica from TID to BID at that time because she was running out.  She is frustrated due to continued pain. She did reach out to neurosurgery, Dr. Schuster. She had a recent lumbar CT and is scheduled for review tomorrow. She takes MS Contin 15 mg Q12 which helps her pain. 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 Her pain today is 5/10.    Interval History 1/6/2025:  The patient returns today for follow up of chronic pain back pain. Since previous visit, she has been weaning down MS Contin. She is now on 15 mg q12h. She tried skipping a dose yesterday but says that she felt jittery. Otherwise, she has done well with regards to decreasing the medication. She has been more active at home which she is happy about. She still has difficulty with walking and feels unsteady. She did complete PT and plans to join a gym. She is also having more right hip pain recently, mainly at night when she lays on the right side. She says that hip injections in the past were not very helpful. Her pain today is 0/10.    Interval History 10/29/2024:  Mrs. Peterson returns today for follow up of chronic back, hip and knee pain. She was previously changed from oxycodone to long acting morphine by Dr. Mayfield was chronic pain. She is taking MS Contin 30mg Q12. She reports significant benefit of pain with this regimen. With the medication, she has no pain. She would like to start weaning down the medication. It was already sent to the  pharmacy for today. She has not been able to be as active as she would like to be since her surgeries. She is able to walk short distances. Her pain today is 0/10.    Interval history: 7/25/24:   Joyce Peterson returns to clinic for opioid management for her chronic low back pain, BL hip and knee pain. At her last visit, oxycodone 10 mg was discontinued and patient was started on 30 mg ER morphine. Patient was instructed to use 10 mg oxycodone for breakthrough pain no more than 3 times daily. Today, she reports that her pain is much improved since the switch in pain medication to morphine. She states that her pain is currently 3/10 in severity with the majority of her pain being in the right knee, localized around the lateral joint line. She is s/p L2-pelvic fusion and states that she was doing home health therapy but is about to start outpatient therapy. She reports that her pain is typically worse at the end of the day with throbbing in the right knee. Of note, she does have DVT present in the RLE. She also uses Tylenol and Lyrica for pain which helps. She denies any new weakness, paresthesias, changes in bowel or bladder function, or saddle anesthesia.      Interval History 7/17/2024:   Joyce Peterson with a history of RA and lumbar radiculopathy s/p L2-pelvis fusion, L5-S1 TLIF comes to the clinic for opioid management for her for chronic lower back, B/L hips and B/L knee pain. Patient's current opioid management is done by [facility] but they will not prescirbe Oxycodone 10 Q4 hours beyond 6 weeks and recommended follow up with pain medicine. Patient said she gets good pain relief with the Oxycodone 10 Q4 hours. Pain is worse at nights, prolonged walking and sitting. She is also using Voltaren gel, Lyrica and Zanaflex. Will use tylenol PRN for break through pain. The pain is mostly in her hips and knees bilaterally.  Her lower back pain with radiation down her legs have improved after the surgery.  Patient  said she got good pain relief and restoration of function with PT but her days were reduced from 3 days to 2 days. She has follow up with [facility] and Rheumatology.     Interval History 6/27/2024:  Joyce Peterson returns to clinic for follow-up for chronic back pain. She is s/p L2-pelvis fusion on 6/12/2024. She has noticed increased pain over the last 3 nights. She had a visit with [facility] on 6/25/2024 and was told this was normal healing and nerve regeneration from the surgery. The patient said [facility] will not prescribe her pain longer than 6 weeks s/p surgery.  Her current regimen from [facility] is oxycodone 10 mg q4 hours, hydromorphone 1 mg for breakthrough pain.  She also takes Lyrica 150 mg TID and Robaxin 750 mg QID.  She is not supposed to take Naproxen s/p surgery, but has taken it a couple times for severe pain.  The patient denies fever/night sweats, urinary incontinence, bowel incontinence, significant weight changes, significant motor weakness or changes, or loss of sensations. Her pain today is 6/10.    Interval History 4/17/2024:   Patient returns to clinic for follow up of low back and hip pain. She was scheduled for SCS trial however trial was not completed due to patient preference. She states she has been seen by neurosugery and orthopedic surgery, for which she plans to undergo back surgery. 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  She reports pain has been worsening  since last visit. She states she continues to take lyrica TID, robaxin 750mg QID, oxycodone 5 q.i.d. p.r.n.  for pain control, she states this has not provided her with relief x the last 3 weeks. She reports she has not been able to participate in HEP due to pain. Pain level at time of exam is reported to be 8.5/10. Patient requests she wants to return to a previous regimen of percocet and dilaudid until she is able to have back procedure.  She expresses excruciating pain that runs down bilateral pain with any movement and with coughing.     Interval history  She returns for virtual follow up of back and hip pain. At her last OV, Dr. Mayfield recommended [place] SCS trial. She had her first part of the psych consult and has the second part this afternoon. She has had limited benefit with multiple interventions in the past including back surgery, procedures, PT and medications. She would like to move forward with SCS trial. Her pain today is 10/10.    Interval History 1/16/24:  Joyce Peterson returns for virtual follow up of low back and left hip pain. She is s/p L4/5 TLIF 6/22/23. She is planning for a left hip RFA and SCS trial with us. 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 She has since had an MRI of her L Spine in December that demonstrated a large amount of extruded disc material extending superiorly from L4-5 into the spinal canal with additional grade 2 anterolisthesis at this level contributing to severe spinal canal stenosis. She reports intermittent weakness in her left > right leg. Patient denies saddle anesthesia, bladder/bowel incontinence, ataxia, or increased falls. Shopping cart sign +. She is able to walk 1 block before needing to stop. Pain improves upon sitting. Pain currently ranges from 5-9/10, currently a 7/10. Pain radiates down her both her legs anterolaterally down to the ankle with additional shin pain. She has been on percocet 5-325 QID PRN and lyrica 150mg TID. She received opioids from Ortho 5/325 in October addition to our Rx. Ortho saw patient today and is recommending an L2-Pelvis    Pertinent Surgeries:  6/12/2024 - L2-Pelvis Fusion ([place])    Pain Medication   15 mg Morphine extended release   Pregabalin 150 mg TID  Voltaren Gel   Tylenol     Image:     CT SCAN OF THE  LUMBAR SPINE WITHOUT CONTRAST AND WITH 3-D POST PROCESSING:    FINDINGS: There is mild lumbar levoscoliosis. The  "patient is status post percutaneous augmentation vertebroplasty, involving the L1 and L2 vertebra. There is grade 1 anterolisthesis of L4 on L5 and there are intradiscal spacers in the L4-5 and L5-S1 disc spaces. There is narrowing and vacuum phenomena involving the L1-2 disc space and there is narrowing of the L2-3 disc space.    The patient is status post bilateral dorsolateral fusion with rods transfixed by pedicular screws on the right and on the left at each vertebral body level from L2-S1. The patient's had a laminectomy extending from the inferior aspect of L3 through the S1 vertebra. The patient also had a bilateral posterolateral bony fusion from L2-L5.    There is no diastases of the sacroiliac joints. There is multilevel facet joint arthropathy and postoperative change. Streak artifact from the orthopedic hardware makes it impossible to evaluate the posterior lumbar disc margins.    Specifically the orthopedic hardware appears to be intact with no obvious hardware fracture or failure. Streak artifact makes it difficult to evaluate for possible hardware loosening but there is no "obvious "loosening of the hardware.    Impression    1. Extensive postop changes including bilateral dorsolateral fusion from L2-S1 with bilateral dorsolateral rods transfixed by pedicular screws at each vertebral level from L2 S1-. There is no obvious hardware fracture/failure. The patient also had a bilateral posterolateral bony fusion at L2-L5. There has been laminectomy that extends from the inferior aspect of L3-S1.  2. There has been percutaneous augmentation vertebroplasties at L2 and L3.  3. There is grade 1 anterolisthesis of L4 on L5 with disc space narrowing of the L1-2 and L2-3 discs, and vacuum phenomena at L1-L2.  4. There is mild lumbar levoscoliosis.      Narrative  [facility] MRI LUMBAR SPINE WITHOUT CONTRAST    FINDINGS:  Extensive susceptibility artifact from L2-L5 posterior transpedicular fusion hardware, L4-S1 " interbody cage grafts, and transverse sacroiliac fixation screws. L3-L5 decompressive laminectomies. Fixed grade 1 anterolisthesis of L4 and L5. Residual short-segment lumbar levocurvature centered on L2-L3.    Markedly decreased T1 and T2 signal within the bowel 1 and L2 vertebral bodies, greater than the extensive corresponding dense sclerosis on CT, potentially representing some combination of marrow sclerosis and/or infarct.    Severe disc height loss at L1-L2 with gas and/or calcification with the disc space. Large nonuniform posterior disc osteophyte complex with some central/left paracentral calcification, resulting in complete effacement of the thecal sac and severe spinal stenosis with mid sagittal thecal sac diameter 5 mm. Compromise of the lateral recesses with definite compression of the descending right L2 nerve roots as well as proximal foraminal portions at L2-L3. Severe bilateral foraminal stenosis, right greater than left. Approximately 3 mm of retrolisthesis of L1 on L2. Mild distraction of the right facet joint with increased synovial fluid signal, likely degenerative.    L2-L3 ligamentum flavum hypertrophy and medial projecting right facet osteophytosis. Large 18 x 9 mm right paracentral/foraminal heterogenous disc extrusion with at least 13 mm of superior migration. The extruded disc appears to extend into the right paracentral/foraminal region at the L1-L2 level, contributing to right L1-L2 foraminal stenosis and lateral recess compromise. Complete effacement of the thecal sac and lateral recesses with mid sagittal diameter 8 mm. Small left foraminal/extraforaminal/far lateral disc-osteophyte complex. Severe right and moderate left foraminal stenosis.    Right-sided L3-L4 facet osteophyte projecting into the right neural foramen with moderate right foraminal stenosis.    No significant residual spinal stenosis at L3-S1.    The conus medullaris terminates at the T12-L1 interspace. There is  subtly increased central T2 signal within the distal conus medullaris, possibly a tiny 5th ventricle. There is severe effacement of the conus medullaris nerve roots at the T1-T2, described above. No discrete thickening of the caudate equina nerve roots, although limited evaluation due to hardware susceptibility.    Advanced atrophy of the paraspinal musculature level of L3. Homogenous 4.8 x 3.1 x 8.1 cm midline fluid collection extending from the L2-L3 interspace to the inferior margin of S1, which broadly abuts the dorsal meninges without obvious defect, seroma versus meningocele.    Moderate atherosclerotic calcifications of the abdominal aorta and included iliac arteries. Bilateral extrarenal pelvis. Left hip intramedullary nail noted on localizer views.    Impression    1.   Redemonstrated postsurgical changes of L2 through bilateral sacroiliac posterior fixation with L3-L5 decompressive laminectomies and and L4-S1 interbody cage grafts. No evidence of mature osseous ankylosis of the posterior elements.  2.   Large L1-L2 posterior disc osteophyte complex and mild degenerative retrolisthesis resulting in severe spinal stenosis and severe bilateral foraminal stenosis.  3.   18 x 9 mm right paracentral/foraminal heterogenous disc extrusion at L2-L3 with significant superior migration combined with ligamentum flavum and right-sided facet hypertrophy with moderate spinal canal stenosis and severe right greater than left foraminal stenosis. Disc extrusion contributes to spinal and right-sided foraminal stenosis at L1-L2.  4.   Compromise of the lateral recesses at L1-L2 and L2-L3 with nerve root compression.      Past Medical History:   Diagnosis Date    Acid reflux     Allergy     Anemia     Asthma     Coronary artery disease     CS (cervical spondylosis) 03/08/2013    Degenerative disc disease     Degenerative joint disease of hindfoot, left 6/28/2022    Dry eyes     Dry mouth     Gastroparesis     History of  methotrexate therapy 01/19/2022    Hyperlipidemia     Lateral meniscus derangement 04/06/2016    Lobular carcinoma in situ     Lumbar spondylosis 03/08/2013    Osteoarthritis     Osteoporosis     Pes planovalgus, acquired, left 6/28/2022    Rheumatoid arthritis(714.0)     Rupture of left triceps tendon 10/17/2018    Umbilical hernia 08/13/2015     Past Surgical History:   Procedure Laterality Date    BACK SURGERY  06/12/2024    revision of prior back surgery on 07/14/2023    BREAST BIOPSY Left 01/29/2002    core bx    CARPAL TUNNEL RELEASE Right 05/2017    CHOLECYSTECTOMY  2004    COLONOSCOPY      10/11    COLONOSCOPY N/A 06/29/2017    Procedure: COLONOSCOPY;  Surgeon: López Moore MD;  Location: [facility] ENDO ([address] [place] [address]);  Service: Endoscopy;  Laterality: N/A;    EPIDURAL STEROID INJECTION N/A 11/18/2021    Procedure: INJECTION, STEROID, EPIDURAL, L5-S1 IL NEED CONSENT;  Surgeon: Tj Mayfield MD;  Location: [facility] PAIN MGT;  Service: Pain Management;  Laterality: N/A;    EPIDURAL STEROID INJECTION N/A 09/29/2022    Procedure: LUMBAR L3/L4 IL MADELINE CONTRAST;  Surgeon: Tj Mayfield MD;  Location: [facility] PAIN MGT;  Service: Pain Management;  Laterality: N/A;    EPIDURAL STEROID INJECTION N/A 12/12/2022    Procedure: INJECTION, STEROID, EPIDURAL L5/S1 IL CONTRAST;  Surgeon: Tj Mayfield MD;  Location: [facility] PAIN MGT;  Service: Pain Management;  Laterality: N/A;    EPIDURAL STEROID INJECTION N/A 04/06/2023    Procedure: INJECTION, STEROID, EPIDURAL L5/S1;  Surgeon: Tj Mayfield MD;  Location: [facility] PAIN MGT;  Service: Pain Management;  Laterality: N/A;    FOOT ARTHRODESIS Left 01/11/2023    Procedure: FUSION, FOOT;  Surgeon: Ariella Finnegan MD;  Location: [facility] OR;  Service: Orthopedics;  Laterality: Left;  nimbus    FUSION, SPINE, LUMBAR, TLIF, POSTERIOR APPROACH, USING PEDICLE SCREW N/A 06/22/2023    Procedure: FUSION, SPINE, LUMBAR, TLIF, POSTERIOR APPROACH, USING PEDICLE SCREW L4/5 spinewave SNS:SSEP/EMG;  Surgeon: Jh CORNELL  MD Jaylin;  Location: [facility] OR 2ND FLR;  Service: Neurosurgery;  Laterality: N/A;    HARVESTING OF BONE GRAFT Left 01/11/2023    Procedure: SURGICAL PROCUREMENT, BONE GRAFT;  Surgeon: Ariella Finnegan MD;  Location: [facility] OR;  Service: Orthopedics;  Laterality: Left;    INJECTION OF ANESTHETIC AGENT AROUND NERVE Bilateral 08/23/2021    Procedure: BLOCK, NERVE, MEDIAL BRANCH L3,L4,L5;  Surgeon: Tj Mayfield MD;  Location: [facility] PAIN MGT;  Service: Pain Management;  Laterality: Bilateral;  1 of 2    INJECTION OF ANESTHETIC AGENT AROUND NERVE Bilateral 08/26/2021    Procedure: BLOCK, NERVE, MEDIAL BRANCH L3,L4,L5;  Surgeon: Tj Mayfield MD;  Location: [facility] PAIN MGT;  Service: Pain Management;  Laterality: Bilateral;  2 of 2    INJECTION OF ANESTHETIC AGENT AROUND NERVE Left 07/07/2022    Procedure: BLOCK, NERVE, LEFT OBTURATOR AND FEMORAL;  Surgeon: Tj Mayfield MD;  Location: [facility] PAIN MGT;  Service: Pain Management;  Laterality: Left;    INJECTION OF FACET JOINT Bilateral 03/12/2020    Procedure: FACET JOINT INJECTION (LUMBAR BLOCK) BILATERAL L4-5 AND L5-S1 DIRECT REFERRAL;  Surgeon: Tj Mayfield MD;  Location: [facility] PAIN MGT;  Service: Pain Management;  Laterality: Bilateral;  NEEDS CONSENT    INJECTION OF FACET JOINT Bilateral 03/29/2021    Procedure: INJECTION, FACET JOINT L3/4, L4/5, L5/S1;  Surgeon: Tj Mayfield MD;  Location: [facility] PAIN MGT;  Service: Pain Management;  Laterality: Bilateral;    INJECTION OF JOINT Right 07/30/2020    Procedure: INJECTION, JOINT, RIGHT HIP Interarticular under flouro;  Surgeon: Tj Mayfield MD;  Location: [facility] PAIN MGT;  Service: Pain Management;  Laterality: Right;  INJECTION, JOINT, RIGHT HIP Interarticular under flouro    INJECTION OF JOINT Right 02/21/2022    Procedure: Injection, Joint RIGHT ISCHIAL BURSA;  Surgeon: Tj Mayfield MD;  Location: [facility] PAIN MGT;  Service: Pain Management;  Laterality: Right;    INJECTION, SPINE, LUMBOSACRAL, TRANSFORAMINAL APPROACH Bilateral  4/21/2025    Procedure: LUMBAR TRANSFORAMINAL BILATERAL L2/3;  Surgeon: Tj Mayfield MD;  Location: [facility] PAIN MGT;  Service: Pain Management;  Laterality: Bilateral;  STAT  2 WK F/U LIZZETTE    INTRAMEDULLARY RODDING OF FEMUR Left 05/21/2019    Procedure: INSERTION, INTRAMEDULLARY ARIEL, FEMUR;  Surgeon: López Duff MD;  Location: [facility] 2ND FLR;  Service: Orthopedics;  Laterality: Left;    LENGTHENING OF TENDON OF LOWER EXTREMITY Left 01/11/2023    Procedure: LENGTHENING, TENDON, LOWER EXTREMITY;  Surgeon: Ariella Finnegan MD;  Location: [facility] OR;  Service: Orthopedics;  Laterality: Left;    MAGNETIC RESONANCE IMAGING N/A 05/29/2023    Procedure: MRI (Magnetic Resonance Imagine);  Surgeon: Sujey Surgeon;  Location: [facility] SUJEY;  Service: Anesthesiology;  Laterality: N/A;    MAGNETIC RESONANCE IMAGING N/A 01/10/2024    Procedure: MRI (Magnetic Resonance Imagine);  Surgeon: Sujey Robin;  Location: [facility];  Service: Anesthesiology;  Laterality: N/A;    RADIOFREQUENCY ABLATION Left 09/20/2021    Procedure: RADIOFREQUENCY ABLATION left L3,4,5 RFA  1 of 2  CONSENT NEEDED;  Surgeon: Tj Mayfield MD;  Location: [facility] PAIN MGT;  Service: Pain Management;  Laterality: Left;    RADIOFREQUENCY ABLATION Right 10/04/2021    Procedure: RADIOFREQUENCY ABLATION  right L3,4,5 RFA   2 of 2  CONSENT NEEDED;  Surgeon: Tj Mayfield MD;  Location: [facility] PAIN MGT;  Service: Pain Management;  Laterality: Right;    RADIOFREQUENCY ABLATION Left 04/21/2022    Procedure: Radiofrequency Ablation LEFT L3,L4,L5;  Surgeon: Tj Mayfield MD;  Location: [facility] PAIN MGT;  Service: Pain Management;  Laterality: Left;    RADIOFREQUENCY ABLATION Right 05/12/2022    Procedure: Radiofrequency Ablation RIGHT L3,L4,L5;  Surgeon: Tj Mayfield MD;  Location: [facility] PAIN MGT;  Service: Pain Management;  Laterality: Right;    RADIOFREQUENCY ABLATION Left 07/25/2022    Procedure: Radiofrequency Ablation Left Femoral & Obturator;  Surgeon: Tj Mayfield MD;  Location:  [facility] PAIN MGT;  Service: Pain Management;  Laterality: Left;    REPAIR OF TRICEPS TENDON Left 10/17/2018    Procedure: REPAIR, TENDON, TRICEPS left elbow;  Surgeon: Staci Yarbrough MD;  Location: [facility] OR [address] [place] [address];  Service: Orthopedics;  Laterality: Left;  Anesthesia: General and regional. PRONE, k-wire , hand pan 1 and pan 2, CALL ARTHREX/Tamera notified 10-12 LO    TONSILLECTOMY      TRANSFORAMINAL EPIDURAL INJECTION OF STEROID Right 08/31/2020    Procedure: INJECTION, STEROID, EPIDURAL, TRANSFORAMINAL,  APPROACH, L3-L4 and L4-L5 need consent;  Surgeon: jT Mayfield MD;  Location: [facility] PAIN MGT;  Service: Pain Management;  Laterality: Right;    TRANSFORAMINAL EPIDURAL INJECTION OF STEROID Bilateral 07/23/2021    Procedure: INJECTION, STEROID, EPIDURAL, TRANSFORAMINAL APPROACH L4/5;  Surgeon: Tj Mayfield MD;  Location: [facility] PAIN MGT;  Service: Pain Management;  Laterality: Bilateral;    TRANSFORAMINAL EPIDURAL INJECTION OF STEROID Bilateral 09/25/2023    Procedure: LUMBAR TRANSFORAMINAL BILATERAL  L2/3 DIRECT REFERRAL;  Surgeon: Tj Mayfield MD;  Location: [facility] PAIN MGT;  Service: Pain Management;  Laterality: Bilateral;    TRANSFORAMINAL EPIDURAL INJECTION OF STEROID Left 10/18/2023    Procedure: LUMBAR TRANSFORAMINAL LEFT L3/4 AND L4/5 * CLEARANCE IN CHART*;  Surgeon: Tj Mayfield MD;  Location: [facility] PAIN MGT;  Service: Pain Management;  Laterality: Left;    TRIGGER POINT INJECTION N/A 07/23/2021    Procedure: INJECTION, TRIGGER POINT SCAPULAR;  Surgeon: Tj Mayfield MD;  Location: [facility] PAIN MGT;  Service: Pain Management;  Laterality: N/A;    TUBAL LIGATION  2003    UPPER GASTROINTESTINAL ENDOSCOPY      10/11    uterine ablation  2003     Review of patient's allergies indicates:   Allergen Reactions    Actemra [tocilizumab] Other (See Comments)     Severe dizziness    Codeine Nausea And Vomiting and Hives    Gold au 198 Hives and Rash    Hydroxychloroquine Other (See Comments)     Can't  "remember the reaction      Iodinated contrast media Other (See Comments)     Other reaction(s): BURNING ALL OVER    Iodine Other (See Comments) and Hives     Other reaction(s): BURNING ALL OVER - Iodine dye - Not topical    Ondansetron Nausea And Vomiting    Sulfa (sulfonamide antibiotics) Other (See Comments)     Can't remember the reaction    Zofran [ondansetron hcl (pf)] Nausea And Vomiting     Pt reports that last time she received zofran she started vomiting again    Methotrexate analogues Nausea Only    Pneumococcal vaccine Other (See Comments)    Pneumovax 23 [pneumococcal 23-argenis ps vaccine] Other (See Comments)     "sick"    Methotrexate Nausea Only     Current Outpatient Medications   Medication Sig Dispense Refill    albuterol (PROVENTIL/VENTOLIN HFA) 90 mcg/actuation inhaler Inhale 2 puffs into the lungs every 6 (six) hours as needed for Wheezing. Rescue 20.1 g 11    BD ULTRA-FINE JAIME PEN NEEDLE 32 gauge x 5/32" Ndle For Forteo - inject daily 100 each 3    budesonide-formoterol 160-4.5 mcg (SYMBICORT) 160-4.5 mcg/actuation HFAA Inhale 2 puffs into the lungs every 12 (twelve) hours. 30.6 g 3    calcium citrate-vitamin D3 315-200 mg (CITRACAL+D) 315 mg-5 mcg (200 unit) per tablet Take 1 tablet by mouth 2 (two) times daily.       cefdinir (OMNICEF) 300 MG capsule Take 1 capsule by mouth every 12 (twelve) hours 60 capsule 1    cycloSPORINE (RESTASIS) 0.05 % ophthalmic emulsion Place 1 drop into both eyes 2 (two) times daily. 180 each 3    diclofenac sodium (VOLTAREN) 1 % Gel APPLY 2 GRAMS TOPICALLY 4 (FOUR) TIMES DAILY AS NEEDED. 300 g 2    fluconazole (DIFLUCAN) 100 MG tablet Take 1 tablet (100 mg total) by mouth once daily. 14 tablet 3    fluconazole (DIFLUCAN) 150 MG Tab Take 150 mg by mouth as needed.      INV cisapride 1 mg/mL suspension Take 20 mg (20 milliliters) by mouth 4 (four) times daily as directed. Shake well before use. FOR INVESTIGATIONAL USE ONLY.  Protocol CIS-USA-154; Participant ID: " [id number]. [id number] mL 0    INV PROPULSID 10 MG Take 2 tablets (20 mg) by mouth 4 (four) times daily. FOR INVESTIGATIONAL USE ONLY. Protocol CIS-USA-154 Subject ID: [id number]. 1440 each 1    leflunomide (ARAVA) 20 MG Tab Take 1 tablet (20 mg total) by mouth once daily. 90 tablet 0    lifitegrast (XIIDRA) 5 % Dpet INSTILL ONE DROP INTO BOTH EYES TWICE A DAY 60 mL 10    magic mouthwash diphen/antac/lidoc rinse mouth with 5 ml for 30 seconds and spit out, twice daily 150 mL 2    methenamine (HIPREX) 1 gram Tab Take 1 tablet (1 g total) by mouth 2 (two) times daily. 60 tablet 11    mirabegron (MYRBETRIQ) 25 mg Tb24 ER tablet Take 1 tablet (25 mg total) by mouth once daily. 30 tablet 11    montelukast (SINGULAIR) 10 mg tablet Take 1 tablet (10 mg total) by mouth every evening. 90 tablet 3    morphine (MS CONTIN) 15 MG 12 hr tablet Take 1 tablet (15 mg total) by mouth 3 (three) times daily. 90 tablet 0    naloxone (NARCAN) 4 mg/actuation Spry 4mg by nasal route as needed for opioid overdose; may repeat every 2-3 minutes in alternating nostrils until medical help arrives. Call 911 2 each 11    naproxen (NAPROSYN) 500 MG tablet TAKE 1 TABLET BY MOUTH TWICE A DAY AS NEEDED 180 tablet 0    omeprazole-sodium bicarbonate (ZEGERID) 40-1.1 mg-gram per capsule TAKE 1 CAPSULE BY MOUTH EVERY DAY IN THE MORNING 90 capsule 3    pantoprazole (PROTONIX) 40 MG tablet Take 1 tablet (40 mg total) by mouth 2 (two) times daily. 180 tablet 3    pantoprazole (PROTONIX) 40 MG tablet Take 1 tablet (40 mg total) by mouth 2 (two) times daily. 60 tablet 3    pentoxifylline (TRENTAL) 400 mg TbSR Take 1 tablet (400 mg total) by mouth 2 (two) times a day. 180 tablet 0    pentoxifylline (TRENTAL) 400 mg TbSR Take 1 tablet by mouth 2 (two) times a day 60 tablet 0    potassium chloride SA (K-DUR,KLOR-CON) 20 MEQ tablet Take 1 tablet (20 mEq total) by mouth once daily. 90 tablet 3    predniSONE (DELTASONE) 1 MG tablet Take 1 tablet (1 mg total) by mouth once  daily. 90 tablet 1    pregabalin (LYRICA) 150 MG capsule Take 1 capsule (150 mg total) by mouth 3 (three) times daily. 270 capsule 3    promethazine (PHENERGAN) 25 MG tablet Take 1 tablet (25 mg total) by mouth every 6 (six) hours as needed for Nausea. 90 tablet 5    rosuvastatin (CRESTOR) 20 MG tablet TAKE 1 TABLET BY MOUTH EVERY DAY IN THE EVENING 90 tablet 3    rosuvastatin (CRESTOR) 20 MG tablet Take 1 tablet by mouth every day in the evening 90 tablet 3    sarilumab (KEVZARA) 200 mg/1.14 mL Syrg Inject 1.14 mL (200 mg total) into the skin every 14 (fourteen) days. 2.28 mL 2    senna-docusate 8.6-50 mg (SENNA PLUS) 8.6-50 mg per tablet Take 1 tablet by mouth daily 30 tablet 1    STIMULANT LAXATIVE PLUS 8.6-50 mg per tablet Take 1 tablet by mouth.      sucralfate (CARAFATE) 1 gram tablet TAKE 1 TABLET BY MOUTH FOUR TIMES A  tablet 2    teriparatide (FORTEO) 20 mcg/dose (560mcg/2.24mL) PnIj Inject 0.08 mLs (20 mcg total) into the skin once daily. 2.4 mL 11    tiZANidine (ZANAFLEX) 4 MG tablet Take 1 tablet (4 mg total) by mouth every 6 (six) hours as needed (for muscle spasms). 120 tablet 1    triamcinolone (NASACORT) 55 mcg nasal inhaler 2 sprays by Nasal route once daily. 16.9 mL 5    vitamin E 1000 UNIT capsule Take 1,000 Units by mouth.      albuterol-ipratropium (DUO-NEB) 2.5 mg-0.5 mg/3 mL nebulizer solution Take 3 mLs by nebulization every 6 (six) hours as needed for Wheezing. Rescue 90 mL 3     No current facility-administered medications for this visit.     Family History   Problem Relation Name Age of Onset    Cancer Mother 69         Lung Cancer    Emphysema Mother 69     Heart attack Mother 69     Alcohol abuse Mother 69     Cancer Father          Lung Cancer    Osteoarthritis Father      Lung cancer Father      Skin cancer Father      Alcohol abuse Father      Heart disease Brother 57     Heart attack Brother 57     Alcohol abuse Brother 57     Cirrhosis Brother 57     Osteoarthritis Paternal  Aunt      Retinal detachment Maternal Aunt      No Known Problems Sister      No Known Problems Maternal Uncle      No Known Problems Paternal Uncle      No Known Problems Maternal Grandmother      No Known Problems Maternal Grandfather      No Known Problems Paternal Grandmother      No Known Problems Paternal Grandfather      Colon cancer Neg Hx      Esophageal cancer Neg Hx      Stomach cancer Neg Hx      Celiac disease Neg Hx      Diabetes Neg Hx      Thyroid disease Neg Hx      Amblyopia Neg Hx      Blindness Neg Hx      Cataracts Neg Hx      Glaucoma Neg Hx      Hypertension Neg Hx      Macular degeneration Neg Hx      Strabismus Neg Hx      Stroke Neg Hx       Social History     Socioeconomic History    Marital status:    Tobacco Use    Smoking status: Never    Smokeless tobacco: Never   Substance and Sexual Activity    Alcohol use: Yes     Comment: 2-3 times per year.    Drug use: No    Sexual activity: Yes     Partners: Male     Birth control/protection: Surgical     Social Drivers of Health     Financial Resource Strain: Low Risk  (4/30/2025)    Overall Financial Resource Strain (CARDIA)     Difficulty of Paying Living Expenses: Not hard at all   Food Insecurity: No Food Insecurity (4/30/2025)    Hunger Vital Sign     Worried About Running Out of Food in the Last Year: Never true     Ran Out of Food in the Last Year: Never true   Transportation Needs: No Transportation Needs (4/30/2025)    PRAPARE - Transportation     Lack of Transportation (Medical): No     Lack of Transportation (Non-Medical): No   Physical Activity: Inactive (4/30/2025)    Exercise Vital Sign     Days of Exercise per Week: 0 days     Minutes of Exercise per Session: 0 min   Stress: No Stress Concern Present (4/30/2025)    [employer] - Occupational Stress Questionnaire     Feeling of Stress : Not at all   Recent Concern: Stress - Stress Concern Present (3/12/2025)    Received from [facility]     "[place] [employer] - Occupational Stress Questionnaire     Feeling of Stress : To some extent   Housing Stability: Low Risk  (4/30/2025)    Housing Stability Vital Sign     Unable to Pay for Housing in the Last Year: No     Number of Times Moved in the Last Year: 0     Homeless in the Last Year: No     REVIEW OF SYSTEMS:    GENERAL:  No weight loss, malaise or fevers.  HEENT:  Negative for frequent or significant headaches.  NECK:  Negative for lumps, goiter, pain and significant neck swelling.  RESPIRATORY:  Negative for cough, wheezing or shortness of breath.  CARDIOVASCULAR:  Negative for chest pain, leg swelling or palpitations.  GI:  Negative for abdominal discomfort, blood in stools or black stools or change in bowel habits.  MUSCULOSKELETAL:  See HPI. +Back pain.   SKIN:  Negative for lesions, rash, and itching.  PSYCH:  Negative for sleep disturbance, mood disorder and recent psychosocial stressors.  HEMATOLOGY/LYMPHOLOGY:  Negative for prolonged bleeding, bruising easily or swollen nodes.  NEURO:   No history of headaches, syncope, paralysis, seizures or tremors.  All other reviewed and negative other than HPI.       Objective:   /69 (BP Location: Right arm, Patient Position: Sitting)   Pulse 95   Resp 19   Ht 5' 1" (1.549 m)   Wt 62.6 kg (138 lb 0.1 oz)   LMP  (LMP Unknown)   SpO2 100%   BMI 26.08 kg/m²   Pain Disability Index Review:      4/9/2025    10:06 AM 3/11/2025    10:26 AM 1/6/2025    10:50 AM   Last 3 PDI Scores   Pain Disability Index (PDI) 54 35 48     PHYSICAL EXAMINATION:    GENERAL APPEARANCE: Well appearing, in no acute distress. Using Wheelchair for mobility  PSYCH:  Mood and affect appropriate.  SKIN: Skin color, texture, turgor normal, no rashes or lesions to visible areas.  HEAD/FACE:  Normocephalic, atraumatic.  CARDIO: Rate regular.  No lower extremity edema. Capillary refill <2 seconds.   PULM: Bilateral chest rise. No apparent respiratory distress. "   GI:  Non-distended  BACK: Straight leg raising in the sitting position is positive to radicular pain. Negative SANDRA/FADIR. Positive facet loading bilaterally. Limited ROM with pain on extension and flexion. TTP over lumbar paraspinal muscles bilaterally.  EXTREMITIES: Peripheral joint ROM is full and pain free without obvious instability or laxity in all four extremities. No deformities, edema, or skin discoloration.   MUSCULOSKELETAL: Bilateral upper and lower extremity strength is normal and symmetric.  No atrophy or tone abnormalities are noted.   NEURO: Bilateral upper and lower extremity coordination and muscle stretch reflexes are physiologic and symmetric.   GAIT: Antalgic.        Assessment:     Joyce Peterson is a 64 y.o. female with history of RA and s/p L2-pelvic fusion that presents for chronic low back, knee, and hip pain.      Encounter Diagnoses   Name Primary?    Chronic pain disorder     Lumbar radiculopathy     Lumbar spondylosis     Encounter for long-term opiate analgesic use     Lumbosacral radiculopathy Yes    Lumbar postlaminectomy syndrome        Plan:   We discussed with the patient the assessment and recommendations. The following is the plan we agreed on:    Images reviewed and discussed with patient.   Continue MS Contin Q8 hours.  Continue to follow up with Neurosurgery, ID and Rheumatology.  She had benefit with recent B/L TFESI L2/3 for one week only. Schedule for Lumbar SCS trial with [facility]. She completed psych consult. She is stopping hyperbaric treatment.  UDS on 3/11/2025 appropriate.    reviewed and appears to be appropriate.   Follow up after trial.    Valarie Law, JAYME  05/09/2025    This note was generated with the assistance of ambient listening technology. Verbal consent was obtained by the patient and accompanying visitor(s) for the recording of patient appointment to facilitate this note. I attest to having reviewed and edited the generated  note for accuracy, though some syntax or spelling errors may persist. Please contact the author of this note for any clarification.

## 2025-05-12 ENCOUNTER — OFFICE VISIT (OUTPATIENT)
Dept: OTOLARYNGOLOGY | Facility: CLINIC | Age: 65
End: 2025-05-12
Payer: MEDICARE

## 2025-05-12 ENCOUNTER — PATIENT MESSAGE (OUTPATIENT)
Dept: PAIN MEDICINE | Facility: OTHER | Age: 65
End: 2025-05-12
Payer: MEDICARE

## 2025-05-12 ENCOUNTER — PATIENT MESSAGE (OUTPATIENT)
Dept: PAIN MEDICINE | Facility: CLINIC | Age: 65
End: 2025-05-12
Payer: MEDICARE

## 2025-05-12 VITALS
DIASTOLIC BLOOD PRESSURE: 66 MMHG | WEIGHT: 143.31 LBS | HEART RATE: 78 BPM | HEIGHT: 61 IN | BODY MASS INDEX: 27.06 KG/M2 | SYSTOLIC BLOOD PRESSURE: 110 MMHG

## 2025-05-12 DIAGNOSIS — G89.4 CHRONIC PAIN SYNDROME: Primary | ICD-10-CM

## 2025-05-12 DIAGNOSIS — H69.93 DYSFUNCTION OF BOTH EUSTACHIAN TUBES: Chronic | ICD-10-CM

## 2025-05-12 DIAGNOSIS — J40 BRONCHITIS: ICD-10-CM

## 2025-05-12 DIAGNOSIS — M54.17 LUMBOSACRAL RADICULOPATHY: ICD-10-CM

## 2025-05-12 DIAGNOSIS — H66.012 NON-RECURRENT ACUTE SUPPURATIVE OTITIS MEDIA OF LEFT EAR WITH SPONTANEOUS RUPTURE OF TYMPANIC MEMBRANE: ICD-10-CM

## 2025-05-12 DIAGNOSIS — J31.0 CHRONIC RHINITIS: Chronic | ICD-10-CM

## 2025-05-12 DIAGNOSIS — J45.41 MODERATE PERSISTENT ASTHMA WITH ACUTE EXACERBATION: ICD-10-CM

## 2025-05-12 DIAGNOSIS — T70.0XXA OTITIC BAROTRAUMA, INITIAL ENCOUNTER: Primary | Chronic | ICD-10-CM

## 2025-05-12 DIAGNOSIS — J01.00 ACUTE NON-RECURRENT MAXILLARY SINUSITIS: ICD-10-CM

## 2025-05-12 DIAGNOSIS — J06.9 UPPER RESPIRATORY TRACT INFECTION, UNSPECIFIED TYPE: ICD-10-CM

## 2025-05-12 PROCEDURE — 1160F RVW MEDS BY RX/DR IN RCRD: CPT | Mod: CPTII,S$GLB,, | Performed by: OTOLARYNGOLOGY

## 2025-05-12 PROCEDURE — 3074F SYST BP LT 130 MM HG: CPT | Mod: CPTII,S$GLB,, | Performed by: OTOLARYNGOLOGY

## 2025-05-12 PROCEDURE — 3008F BODY MASS INDEX DOCD: CPT | Mod: CPTII,S$GLB,, | Performed by: OTOLARYNGOLOGY

## 2025-05-12 PROCEDURE — 99214 OFFICE O/P EST MOD 30 MIN: CPT | Mod: S$GLB,,, | Performed by: OTOLARYNGOLOGY

## 2025-05-12 PROCEDURE — 1159F MED LIST DOCD IN RCRD: CPT | Mod: CPTII,S$GLB,, | Performed by: OTOLARYNGOLOGY

## 2025-05-12 PROCEDURE — 99999 PR PBB SHADOW E&M-EST. PATIENT-LVL IV: CPT | Mod: PBBFAC,,, | Performed by: OTOLARYNGOLOGY

## 2025-05-12 PROCEDURE — 3078F DIAST BP <80 MM HG: CPT | Mod: CPTII,S$GLB,, | Performed by: OTOLARYNGOLOGY

## 2025-05-12 RX ORDER — OFLOXACIN 3 MG/ML
3 SOLUTION AURICULAR (OTIC) 2 TIMES DAILY
Qty: 10 ML | Refills: 0 | Status: SHIPPED | OUTPATIENT
Start: 2025-05-12 | End: 2025-05-23

## 2025-05-12 RX ORDER — IPRATROPIUM BROMIDE AND ALBUTEROL SULFATE 2.5; .5 MG/3ML; MG/3ML
3 SOLUTION RESPIRATORY (INHALATION) EVERY 6 HOURS PRN
Qty: 90 ML | Refills: 3 | Status: SHIPPED | OUTPATIENT
Start: 2025-05-12 | End: 2026-05-12

## 2025-05-12 RX ORDER — PREDNISONE 10 MG/1
10 TABLET ORAL DAILY
Qty: 10 TABLET | Refills: 0 | Status: SHIPPED | OUTPATIENT
Start: 2025-05-12

## 2025-05-12 RX ORDER — CEFDINIR 300 MG/1
300 CAPSULE ORAL 2 TIMES DAILY
Qty: 20 CAPSULE | Refills: 0 | Status: SHIPPED | OUTPATIENT
Start: 2025-05-12 | End: 2025-05-23

## 2025-05-12 RX ORDER — TRIAMCINOLONE ACETONIDE 55 UG/1
2 SPRAY, METERED NASAL DAILY
Qty: 16.9 ML | Refills: 5 | Status: SHIPPED | OUTPATIENT
Start: 2025-05-12

## 2025-05-12 RX ORDER — BENZONATATE 200 MG/1
200 CAPSULE ORAL 3 TIMES DAILY PRN
Qty: 30 CAPSULE | Refills: 1 | Status: SHIPPED | OUTPATIENT
Start: 2025-05-12 | End: 2025-06-01

## 2025-05-12 NOTE — PROGRESS NOTES
Chief Complaint   Patient presents with    Follow-up     Pt stated she has some fluid and bubbles in her ear       HPI:  Patient is a 64 y.o. female who has previously seen me for chronic rhinitis, chronic otitis externa, right ear canal lesion, previous sialoadenitis.  At last visit she was treated with Ciprodex otic drops for right ear canal inflammation, and mupirocin for left nasal vestibule inflammation and small lesion.  She presents today for follow-up to re-examine these areas and assess response to therapy.    Since the last visit, the patient reports the nasal crusting and irritation has improved though does occasionally flare up.  She used Ciprodex drops on the right ear for short time, but they were causing pain with use so she stopped.  She has not noticed any otorrhea or other ongoing issues with the right ear, though she occasionally has mild discomfort.  She does complain of purulent nasal secretions and thick postnasal drip over the last few weeks.  She has some sinus pressure and ear pressure as well.  She does report a history of allergic rhinitis symptoms in the past and has had some occasional sinusitis when allergies flare.      Interval HPI 11/26/2024 :      Patient reports she has not had any significant otorrhea from the right.  She still continues to use Q-tips in the ears and does notice some scant blood in the ear when she does this.  Previous cultures from last visit did not grow any bacteria or fungus.    She also reports mild increase in her rhinitis symptoms over the last few days.  She uses flonase and azelastine occasionally, and she uses zyrtec, but antihistamines cause significant drying of her eyes.    Interval HPI 02/13/2025 :      Patient was using [employer] eardrops and felt the issue was improving so she had stopped use recently.  She does not report any changes or worsening of the right ear symptoms.  She has had an increase in her allergy/rhinitis symptoms over the  "last day.        Component 2 mo ago   Aerobic Bacterial Culture  Abnormal   STAPHYLOCOCCUS AUREUS  Many    Resulting Agency [facility]        Susceptibility     Staphylococcus aureus     CULTURE, AEROBIC  (SPECIFY SOURCE)     Clindamycin <=0.5 mcg/mL Resistant     Erythromycin >4 mcg/mL Resistant     Oxacillin <=0.25 mcg/mL Sensitive     Tetracycline <=4 mcg/mL Sensitive     Trimeth/Sulfa <=0.5/9.5 m... Sensitive                   Interval HPI 03/20/2025 :      Patient started hyperbaric O2 treatments and noted to have possible serous effusion after first "dive".  She was able to depressurize her left ear but not her right during her descent at her HBO treatment.  Afterwards, the nurse noted erythema and effusion in the right ME with trace bloody effusion.   She feels it has improved since a few days ago, but she still cannot get the ear to pop.   She has not been using nasacort due to eye side effects.    Interval HPI 03/24/2025 :    Took steroids, no significant improvement in symptoms.  Here for PET placement for HBO.  Right ear still feels clogged.  No pain.    Interval HPI 04/14/2025 :    Patient had PET placed in right ear at last visit.   Patient complains that she feels her hearing is off in the right ear since her 1st hyperbaric treatment.  She used eardrops after placement if her PE tube at last visit, but has not continued to use these past the 1 week harsha.  She does not report any otorrhea or ear pain.  She has continued hyperbaric oxygen treatments and is able to easily Depressurize the left ear.    Interval HPI 05/12/2025 :      Patient had been doing well with hyperbaric oxygen, but approximately 1-2 weeks ago she began having upper respiratory infection symptoms.  Nasal congestion, postnasal drip, cough.  Symptoms progressed to bronchitis and she was seen by urgent care who placed her on azithromycin.  She has been using her nebulizer treatments and does feel slightly improved.  During that time, she " attempted hyperbaric oxygen treatments, but had acute pain in the left ear and ear and down into the left upper neck.  She stopped HBO, and they wanted further evaluation of the ear prior to restarting.  She does report some scant bloody otorrhea from the left, muffled hearing currently.        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    Thyroid nodule 08/22/2013    Hyperlipidemia 08/22/2013    Mild intermittent asthma without complication 11/04/2015    Rheumatoid arthritis involving multiple sites 11/16/2016    Iron deficiency anemia due to chronic blood loss 11/14/2017    Sjogren's syndrome 05/21/2018    Candidal esophagitis 10/15/2018    Coronary artery disease involving native coronary artery of native heart without angina pectoris 07/16/2019    Subclinical hyperthyroidism 02/21/2020    Dry eyes     Ureterocele     Spinal stenosis, lumbar region without neurogenic claudication 07/23/2021    History of methotrexate therapy 01/19/2022    NAFLD (nonalcoholic fatty liver disease) 12/29/2022    Chronic pain 12/29/2022    Hypokalemia 06/24/2023    Arthrodesis status 06/29/2023    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 01/11/2024    Nausea 04/15/2024    Acute deep vein thrombosis (DVT) of right peroneal vein 07/22/2024     Resolved Ambulatory Problems     Diagnosis Date Noted    Osteoarthritis involving multiple joints on both sides of body 08/01/2012    Coronary artery disease 02/06/2013    Posterior tibial tendinitis of left leg 03/08/2013    Lumbar spondylosis 03/08/2013    CS (cervical spondylosis) 03/08/2013    Osteoarthritis of both knees 03/08/2013    Occipital neuralgia 08/13/2014    Osteoarthritis of CMC joint of thumb 11/13/2014    URTI (acute upper respiratory infection) 07/28/2015    Umbilical hernia 08/13/2015    CMC (carpometacarpal) synovitis 10/19/2015    AR (allergic  rhinitis) 11/04/2015    Iron deficiency anemia 11/11/2015    Vomiting 11/23/2015    Viral gastroenteritis 11/23/2015    Sepsis 11/23/2015    SIRS due to infectious process with organ dysfunction 11/23/2015    Intractable vomiting with nausea 11/23/2015    Volume depletion, gastrointestinal loss 11/23/2015    Immunosuppressed status 03/03/2016    Lateral meniscus derangement 04/06/2016    Lateral epicondylitis of right elbow 08/10/2016    Pleurisy 11/01/2016    Thoracic back pain 02/07/2017    Low back pain 02/07/2017    Carpal tunnel syndrome of right wrist 04/04/2017    Elevated parathyroid hormone 05/10/2017    Low TSH level 05/10/2017    Hypogammaglobulinemia 05/10/2017    Right carpal tunnel syndrome 05/29/2017    Iron deficiency anemia due to chronic blood loss 06/29/2017    Pain in right hand 06/30/2017    Pain in right elbow 06/30/2017    Range of motion deficit 06/30/2017    Decreased  strength of right hand 06/30/2017    Right shoulder pain 08/21/2017    Pure hypercholesterolemia 01/08/2018    Research study patient 04/30/2018    Left elbow pain 06/20/2018    Rupture of left triceps tendon 10/17/2018    Pain in left elbow 11/01/2018    Stiffness of left elbow joint 01/10/2019    Weakness of left arm 01/10/2019    Left hip pain 03/06/2019    Acute hip pain, left 03/20/2019    Gait abnormality 03/20/2019    Greater trochanteric bursitis of left hip 05/13/2019    Stress fracture of neck of left femur 05/20/2019    Left leg weakness 06/06/2019    Impaired functional mobility, balance, gait, and endurance 06/13/2019    Buttock pain 09/27/2019    Difficulty walking 12/05/2019    ROBERT (iron deficiency anemia) 01/24/2020    Fibromyalgia 03/12/2020    Recurrent UTI     Atrophic vaginitis     Drug-induced constipation     Dry mouth     Nocturia     Urinary frequency     Urge urinary incontinence     RADHA (stress urinary incontinence, female)     Elevated serum GGT level 03/17/2021    History of long term use of  Methotrexate  03/17/2021    Transaminasemia 03/17/2021    Chronic pain 03/29/2021    Acute pain of left shoulder 04/26/2021    Osteoarthritis of lumbar spine 08/23/2021    Decreased range of motion with decreased strength 10/05/2021    Impaired mobility and activities of daily living 10/05/2021    Decreased strength of lower extremity 10/08/2021    Healthcare maintenance 01/18/2022    Abnormal finding of blood chemistry, unspecified  01/19/2022    Stress fracture of left foot 06/28/2022    Primary osteoarthritis, left ankle and foot 06/28/2022    Pes planovalgus, acquired, left 06/28/2022    Degenerative joint disease of hindfoot, left 06/28/2022    Decreased strength, endurance, and mobility 07/05/2022    Impaired tolerance of activity 07/05/2022    Current chronic use of systemic steroids 12/29/2022    Hormone replacement therapy (HRT) 12/29/2022    Edema 12/29/2022    Leukopenia 12/29/2022    History of COVID-19 12/29/2022    Neuropathic pain of left foot 02/03/2023    Impaired gait and mobility 03/01/2023    Lumbar stenosis 06/23/2023    Lower extremity edema 06/23/2023    Cutaneous abscess of right knee 06/27/2023    Long term (current) use of inhaled steroids 06/29/2023    History of falling 06/29/2023    Encounter for other orthopedic aftercare 06/29/2023    Normocytic anemia 06/29/2023    Age-related osteoporosis without current pathological fracture 06/29/2023    Long-term use of high-risk medication 11/07/2023    S/P lumbar fusion 07/31/2024     Past Medical History:   Diagnosis Date    Acid reflux     Allergy     Anemia     Asthma     Degenerative disc disease     Lobular carcinoma in situ     Osteoarthritis     Osteoporosis     Rheumatoid arthritis(714.0)        Review of Systems  General: negative for chills, fever or weight loss  Psychological: negative for mood changes or depression  Ophthalmic: negative for blurry vision, photophobia or eye pain  ENT: see HPI  Respiratory: no cough, shortness of  breath, or wheezing  Cardiovascular: no chest pain or dyspnea on exertion  Gastrointestinal: no abdominal pain, change in bowel habits, or black/ bloody stools  Musculoskeletal: negative for gait disturbance or muscular weakness  Neurological: no syncope or seizures; no ataxia  Dermatological: negative for pruritis, rash and jaundice  Hematologic/lymphatic: no easy bruising, no new adenopathy    Physical Exam     Vitals:    05/12/25 1340   BP: 110/66   Pulse: 78               Constitutional:   She is oriented to person, place, and time. Vital signs are normal. She appears well-developed and well-nourished. She appears alert. She is cooperative. Normal speech.      Head:  Normocephalic and atraumatic. Salivary glands normal.  Facial strength is normal.      Ears:    Right Ear: No drainage or tenderness (mild tenderness in lateral portion EAC near where crusted area located). Tympanic membrane is not injected, not perforated, not erythematous and not retracted. No middle ear effusion (scant, bloody). A PE tube is seen.   Left Ear: There is drainage (scant bloody drainage left EAC near to, no obvious perforation). Tympanic membrane is injected and erythematous. Tympanic membrane is not perforated and not retracted.  No middle ear effusion. There is hemotympanum.   Ears:      Nose:  Mucosal edema present. No rhinorrhea, septal deviation or polyps. Turbinates abnormal and turbinate hypertrophy (3+, boggy, congested mucosa).  Right sinus exhibits no maxillary sinus tenderness and no frontal sinus tenderness. Left sinus exhibits no maxillary sinus tenderness and no frontal sinus tenderness.     Mouth/Throat  Oropharynx clear and moist without lesions or asymmetry, lips, teeth, and gums normal and oropharynx normal. No mucous membrane lesions. No oropharyngeal exudate, posterior oropharyngeal edema or posterior oropharyngeal erythema. Mirror exam not performed due to patient tolerance.  Mirror exam not performed due to  patient tolerance.      Neck:  Neck normal without thyromegaly masses, asymmetry, normal tracheal structure, crepitus, and tenderness, thyroid normal, trachea normal, phonation normal, full range of motion with neck supple and no adenopathy. No JVD present. Carotid bruit is not present. No thyroid mass and no thyromegaly present.     She has no cervical adenopathy.     Cardiovascular:    Normal rate, regular rhythm and rate and rhythm, heart sounds, and pulses normal.              Pulmonary/Chest:   Effort and breath sounds normal.     Psychiatric:   She has a normal mood and affect. Her speech is normal and behavior is normal.     Neurological:   She is alert and oriented to person, place, and time. She has neurological normal, alert and oriented. No cranial nerve deficit.     Skin:   No abrasions, lacerations, lesions, or rashes.         Previous Audiogram: Interpreted by me and reviewed with the patient today.    Hearing and right ear stable from previous.  No changes noted.  Large volume and type B tympanogram right, type C tympanogram left.              Assessment/Plan:      ICD-10-CM ICD-9-CM    1. Otitic barotrauma, initial encounter  T70.0XXA 993.0      E902.9       2. Chronic rhinitis  J31.0 472.0 triamcinolone (NASACORT) 55 mcg nasal inhaler      3. Moderate persistent asthma with acute exacerbation  J45.41 493.92 albuterol-ipratropium (DUO-NEB) 2.5 mg-0.5 mg/3 mL nebulizer solution      4. Bronchitis  J40 490 albuterol-ipratropium (DUO-NEB) 2.5 mg-0.5 mg/3 mL nebulizer solution      5. Dysfunction of both eustachian tubes  H69.93 381.81       6. Acute non-recurrent maxillary sinusitis  J01.00 461.0       7. Upper respiratory tract infection, unspecified type  J06.9 465.9       8. Non-recurrent acute suppurative otitis media of left ear with spontaneous rupture of tympanic membrane  H66.012 382.01           Refilled albuterol nebs.    Refilled Nasacort.    Start Omnicef and steroid taper.  Ofloxacin drops  left ear.  Linda swain.    Patient will return in 2 weeks for tympanogram and to reassess left ear, and will plan on placing PE tube at that visit if able.    Patient will have to hold hyperbaric oxygen at this time until left ear issue resolved    Love Whaley MD  [facility]

## 2025-05-12 NOTE — PATIENT INSTRUCTIONS
Start ear drops in left ear.  Start antibiotics and steroid taper.   Refilled nasal spray, nebulizer.      We will reassess in 2 weeks and possibly place PET on left at that visit if able.

## 2025-05-14 ENCOUNTER — TELEPHONE (OUTPATIENT)
Dept: PAIN MEDICINE | Facility: CLINIC | Age: 65
End: 2025-05-14
Payer: MEDICARE

## 2025-05-14 ENCOUNTER — PATIENT MESSAGE (OUTPATIENT)
Dept: PAIN MEDICINE | Facility: CLINIC | Age: 65
End: 2025-05-14
Payer: MEDICARE

## 2025-05-14 DIAGNOSIS — H04.123 BILATERAL DRY EYES: ICD-10-CM

## 2025-05-14 RX ORDER — LIFITEGRAST 50 MG/ML
SOLUTION/ DROPS OPHTHALMIC
Qty: 60 ML | Refills: 10 | Status: SHIPPED | OUTPATIENT
Start: 2025-05-14

## 2025-05-14 NOTE — TELEPHONE ENCOUNTER
----- Message from [employer] sent at 5/14/2025  2:20 PM CDT -----  Who called:selfWhat is the request in detail: for the post op appt on June 5 she would like to see manuelito park Can the clinic reply by [facility]?noWould the patient rather a call back or a response via [facility]:.[phone] Information:

## 2025-05-26 ENCOUNTER — CLINICAL SUPPORT (OUTPATIENT)
Dept: OTOLARYNGOLOGY | Facility: CLINIC | Age: 65
End: 2025-05-26
Payer: MEDICARE

## 2025-05-26 ENCOUNTER — OFFICE VISIT (OUTPATIENT)
Dept: OTOLARYNGOLOGY | Facility: CLINIC | Age: 65
End: 2025-05-26
Payer: MEDICARE

## 2025-05-26 VITALS
HEART RATE: 88 BPM | DIASTOLIC BLOOD PRESSURE: 74 MMHG | BODY MASS INDEX: 25.97 KG/M2 | SYSTOLIC BLOOD PRESSURE: 114 MMHG | WEIGHT: 137.44 LBS

## 2025-05-26 DIAGNOSIS — H60.63 CHRONIC OTITIS EXTERNA OF BOTH EARS, UNSPECIFIED TYPE: Chronic | ICD-10-CM

## 2025-05-26 DIAGNOSIS — J31.0 CHRONIC RHINITIS: Chronic | ICD-10-CM

## 2025-05-26 DIAGNOSIS — H69.93 EUSTACHIAN TUBE DYSFUNCTION, BILATERAL: ICD-10-CM

## 2025-05-26 DIAGNOSIS — H69.93 EUSTACHIAN TUBE DYSFUNCTION, BILATERAL: Primary | Chronic | ICD-10-CM

## 2025-05-26 DIAGNOSIS — H93.293 ABNORMAL AUDITORY PERCEPTION, BILATERAL: Primary | ICD-10-CM

## 2025-05-26 DIAGNOSIS — T70.0XXD OTITIC BAROTRAUMA, SUBSEQUENT ENCOUNTER: ICD-10-CM

## 2025-05-26 PROCEDURE — 1160F RVW MEDS BY RX/DR IN RCRD: CPT | Mod: CPTII,S$GLB,, | Performed by: OTOLARYNGOLOGY

## 2025-05-26 PROCEDURE — 3008F BODY MASS INDEX DOCD: CPT | Mod: CPTII,S$GLB,, | Performed by: OTOLARYNGOLOGY

## 2025-05-26 PROCEDURE — 69433 CREATE EARDRUM OPENING: CPT | Mod: LT,S$GLB,, | Performed by: OTOLARYNGOLOGY

## 2025-05-26 PROCEDURE — 1159F MED LIST DOCD IN RCRD: CPT | Mod: CPTII,S$GLB,, | Performed by: OTOLARYNGOLOGY

## 2025-05-26 PROCEDURE — 3078F DIAST BP <80 MM HG: CPT | Mod: CPTII,S$GLB,, | Performed by: OTOLARYNGOLOGY

## 2025-05-26 PROCEDURE — 3074F SYST BP LT 130 MM HG: CPT | Mod: CPTII,S$GLB,, | Performed by: OTOLARYNGOLOGY

## 2025-05-26 PROCEDURE — 99214 OFFICE O/P EST MOD 30 MIN: CPT | Mod: 25,S$GLB,, | Performed by: OTOLARYNGOLOGY

## 2025-05-26 PROCEDURE — 99999 PR PBB SHADOW E&M-EST. PATIENT-LVL IV: CPT | Mod: PBBFAC,,, | Performed by: OTOLARYNGOLOGY

## 2025-05-26 PROCEDURE — 92567 TYMPANOMETRY: CPT | Mod: S$GLB,,, | Performed by: AUDIOLOGIST

## 2025-05-26 NOTE — PROGRESS NOTES
IN OFFICE PROCEDURE:    Pre-Procedure Diagnosis: Chronic Serous OM and ETD unresponsive to medical management    Post-Procedure Diagnosis:  Same    Procedure:  Myringotomy with tympanostomy tube, left      After written consent was obtained, the procedure was performed under binocular otomicroscopy with sterile technique.  A small amount of phenol was applied to the anterior inferior quadrant of the patient's tympanic membrane.  Using a myringotomy blade, a radial myringotomy was made in the area of anesthetized tympanic membrane.  A #5 pillai tip suction was used to evacuate a clear middle ear effusion.  An darden beveled grommet tube was then placed into the myringotomy site without difficulty.  Ciprodex otic drops were placed in the left ear. The patient tolerated the procedure well and there were no significant complications.    Love Whaley MD  [facility]    This note was created by combination of typed  and M[employer] dictation.  Transcription errors may be present.  If there are any questions, please contact me.

## 2025-05-26 NOTE — PROGRESS NOTES
Joyce Peterson was seen today in the clinic for Tympanometry testing.  Her main complaint was feeling like her left ear is stopped up and her hearing muffled in the left ear.    Tympanometry revealed a Type B tympanogram with a 4.56 ear canal volume for the right ear and a Type As tympanogram for the left ear.  She will follow up with ENT.

## 2025-05-26 NOTE — PROGRESS NOTES
Chief Complaint   Patient presents with    Follow-up     Improvement since last visit       HPI:  Patient is a 64 y.o. female who has previously seen me for chronic rhinitis, chronic otitis externa, right ear canal lesion, previous sialoadenitis.  At last visit she was treated with Ciprodex otic drops for right ear canal inflammation, and mupirocin for left nasal vestibule inflammation and small lesion.  She presents today for follow-up to re-examine these areas and assess response to therapy.    Since the last visit, the patient reports the nasal crusting and irritation has improved though does occasionally flare up.  She used Ciprodex drops on the right ear for short time, but they were causing pain with use so she stopped.  She has not noticed any otorrhea or other ongoing issues with the right ear, though she occasionally has mild discomfort.  She does complain of purulent nasal secretions and thick postnasal drip over the last few weeks.  She has some sinus pressure and ear pressure as well.  She does report a history of allergic rhinitis symptoms in the past and has had some occasional sinusitis when allergies flare.      Interval HPI 11/26/2024 :      Patient reports she has not had any significant otorrhea from the right.  She still continues to use Q-tips in the ears and does notice some scant blood in the ear when she does this.  Previous cultures from last visit did not grow any bacteria or fungus.    She also reports mild increase in her rhinitis symptoms over the last few days.  She uses flonase and azelastine occasionally, and she uses zyrtec, but antihistamines cause significant drying of her eyes.    Interval HPI 02/13/2025 :      Patient was using [employer] eardrops and felt the issue was improving so she had stopped use recently.  She does not report any changes or worsening of the right ear symptoms.  She has had an increase in her allergy/rhinitis symptoms over the last day.       "  Component 2 mo ago   Aerobic Bacterial Culture  Abnormal   STAPHYLOCOCCUS AUREUS  Many    Resulting Agency [facility]        Susceptibility     Staphylococcus aureus     CULTURE, AEROBIC  (SPECIFY SOURCE)     Clindamycin <=0.5 mcg/mL Resistant     Erythromycin >4 mcg/mL Resistant     Oxacillin <=0.25 mcg/mL Sensitive     Tetracycline <=4 mcg/mL Sensitive     Trimeth/Sulfa <=0.5/9.5 m... Sensitive                   Interval HPI 03/20/2025 :      Patient started hyperbaric O2 treatments and noted to have possible serous effusion after first "dive".  She was able to depressurize her left ear but not her right during her descent at her HBO treatment.  Afterwards, the nurse noted erythema and effusion in the right ME with trace bloody effusion.   She feels it has improved since a few days ago, but she still cannot get the ear to pop.   She has not been using nasacort due to eye side effects.    Interval HPI 03/24/2025 :    Took steroids, no significant improvement in symptoms.  Here for PET placement for HBO.  Right ear still feels clogged.  No pain.    Interval HPI 04/14/2025 :    Patient had PET placed in right ear at last visit.   Patient complains that she feels her hearing is off in the right ear since her 1st hyperbaric treatment.  She used eardrops after placement if her PE tube at last visit, but has not continued to use these past the 1 week harsha.  She does not report any otorrhea or ear pain.  She has continued hyperbaric oxygen treatments and is able to easily Depressurize the left ear.    Interval HPI 05/12/2025 :      Patient had been doing well with hyperbaric oxygen, but approximately 1-2 weeks ago she began having upper respiratory infection symptoms.  Nasal congestion, postnasal drip, cough.  Symptoms progressed to bronchitis and she was seen by urgent care who placed her on azithromycin.  She has been using her nebulizer treatments and does feel slightly improved.  During that time, she attempted " hyperbaric oxygen treatments, but had acute pain in the left ear and ear and down into the left upper neck.  She stopped HBO, and they wanted further evaluation of the ear prior to restarting.  She does report some scant bloody otorrhea from the left, muffled hearing currently.    Interval HPI 05/26/2025 :      Ear is better today, still feels muffled and hearing stiff feels decreased on left side.     She is supposed to resume HBO later this week.         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    Thyroid nodule 08/22/2013    Hyperlipidemia 08/22/2013    Mild intermittent asthma without complication 11/04/2015    Rheumatoid arthritis involving multiple sites 11/16/2016    Iron deficiency anemia due to chronic blood loss 11/14/2017    Sjogren's syndrome 05/21/2018    Candidal esophagitis 10/15/2018    Coronary artery disease involving native coronary artery of native heart without angina pectoris 07/16/2019    Subclinical hyperthyroidism 02/21/2020    Dry eyes     Ureterocele     Spinal stenosis, lumbar region without neurogenic claudication 07/23/2021    History of methotrexate therapy 01/19/2022    NAFLD (nonalcoholic fatty liver disease) 12/29/2022    Chronic pain 12/29/2022    Hypokalemia 06/24/2023    Arthrodesis status 06/29/2023    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 01/11/2024    Nausea 04/15/2024    Acute deep vein thrombosis (DVT) of right peroneal vein 07/22/2024     Resolved Ambulatory Problems     Diagnosis Date Noted    Osteoarthritis involving multiple joints on both sides of body 08/01/2012    Coronary artery disease 02/06/2013    Posterior tibial tendinitis of left leg 03/08/2013    Lumbar spondylosis 03/08/2013    CS (cervical spondylosis) 03/08/2013    Osteoarthritis of both knees 03/08/2013    Occipital neuralgia 08/13/2014    Osteoarthritis of CMC joint of thumb  11/13/2014    URTI (acute upper respiratory infection) 07/28/2015    Umbilical hernia 08/13/2015    CMC (carpometacarpal) synovitis 10/19/2015    AR (allergic rhinitis) 11/04/2015    Iron deficiency anemia 11/11/2015    Vomiting 11/23/2015    Viral gastroenteritis 11/23/2015    Sepsis 11/23/2015    SIRS due to infectious process with organ dysfunction 11/23/2015    Intractable vomiting with nausea 11/23/2015    Volume depletion, gastrointestinal loss 11/23/2015    Immunosuppressed status 03/03/2016    Lateral meniscus derangement 04/06/2016    Lateral epicondylitis of right elbow 08/10/2016    Pleurisy 11/01/2016    Thoracic back pain 02/07/2017    Low back pain 02/07/2017    Carpal tunnel syndrome of right wrist 04/04/2017    Elevated parathyroid hormone 05/10/2017    Low TSH level 05/10/2017    Hypogammaglobulinemia 05/10/2017    Right carpal tunnel syndrome 05/29/2017    Iron deficiency anemia due to chronic blood loss 06/29/2017    Pain in right hand 06/30/2017    Pain in right elbow 06/30/2017    Range of motion deficit 06/30/2017    Decreased  strength of right hand 06/30/2017    Right shoulder pain 08/21/2017    Pure hypercholesterolemia 01/08/2018    Research study patient 04/30/2018    Left elbow pain 06/20/2018    Rupture of left triceps tendon 10/17/2018    Pain in left elbow 11/01/2018    Stiffness of left elbow joint 01/10/2019    Weakness of left arm 01/10/2019    Left hip pain 03/06/2019    Acute hip pain, left 03/20/2019    Gait abnormality 03/20/2019    Greater trochanteric bursitis of left hip 05/13/2019    Stress fracture of neck of left femur 05/20/2019    Left leg weakness 06/06/2019    Impaired functional mobility, balance, gait, and endurance 06/13/2019    Buttock pain 09/27/2019    Difficulty walking 12/05/2019    ROBERT (iron deficiency anemia) 01/24/2020    Fibromyalgia 03/12/2020    Recurrent UTI     Atrophic vaginitis     Drug-induced constipation     Dry mouth     Nocturia     Urinary  frequency     Urge urinary incontinence     RADHA (stress urinary incontinence, female)     Elevated serum GGT level 03/17/2021    History of long term use of Methotrexate  03/17/2021    Transaminasemia 03/17/2021    Chronic pain 03/29/2021    Acute pain of left shoulder 04/26/2021    Osteoarthritis of lumbar spine 08/23/2021    Decreased range of motion with decreased strength 10/05/2021    Impaired mobility and activities of daily living 10/05/2021    Decreased strength of lower extremity 10/08/2021    Healthcare maintenance 01/18/2022    Abnormal finding of blood chemistry, unspecified  01/19/2022    Stress fracture of left foot 06/28/2022    Primary osteoarthritis, left ankle and foot 06/28/2022    Pes planovalgus, acquired, left 06/28/2022    Degenerative joint disease of hindfoot, left 06/28/2022    Decreased strength, endurance, and mobility 07/05/2022    Impaired tolerance of activity 07/05/2022    Current chronic use of systemic steroids 12/29/2022    Hormone replacement therapy (HRT) 12/29/2022    Edema 12/29/2022    Leukopenia 12/29/2022    History of COVID-19 12/29/2022    Neuropathic pain of left foot 02/03/2023    Impaired gait and mobility 03/01/2023    Lumbar stenosis 06/23/2023    Lower extremity edema 06/23/2023    Cutaneous abscess of right knee 06/27/2023    Long term (current) use of inhaled steroids 06/29/2023    History of falling 06/29/2023    Encounter for other orthopedic aftercare 06/29/2023    Normocytic anemia 06/29/2023    Age-related osteoporosis without current pathological fracture 06/29/2023    Long-term use of high-risk medication 11/07/2023    S/P lumbar fusion 07/31/2024     Past Medical History:   Diagnosis Date    Acid reflux     Allergy     Anemia     Asthma     Degenerative disc disease     Lobular carcinoma in situ     Osteoarthritis     Osteoporosis     Rheumatoid arthritis(714.0)        Review of Systems  General: negative for chills, fever or weight loss  Psychological:  negative for mood changes or depression  Ophthalmic: negative for blurry vision, photophobia or eye pain  ENT: see HPI  Respiratory: no cough, shortness of breath, or wheezing  Cardiovascular: no chest pain or dyspnea on exertion  Gastrointestinal: no abdominal pain, change in bowel habits, or black/ bloody stools  Musculoskeletal: negative for gait disturbance or muscular weakness  Neurological: no syncope or seizures; no ataxia  Dermatological: negative for pruritis, rash and jaundice  Hematologic/lymphatic: no easy bruising, no new adenopathy    Physical Exam     Vitals:    05/26/25 1357   BP: 114/74   Pulse: 88                 Constitutional:   She is oriented to person, place, and time. Vital signs are normal. She appears well-developed and well-nourished. She appears alert. She is cooperative. Normal speech.      Head:  Normocephalic and atraumatic. Salivary glands normal.  Facial strength is normal.      Ears:    Right Ear: No drainage or tenderness (mild tenderness in lateral portion EAC near where crusted area located). Tympanic membrane is not injected, not perforated, not erythematous and not retracted. No middle ear effusion (scant, bloody). A PE tube is seen.   Left Ear: No drainage. Tympanic membrane is not injected, not perforated, not erythematous and not retracted. A middle ear effusion (scant serous) is present. No hemotympanum.   Ears:    Dry skin and skin irritation bilateral ear canals, consistent with chronic otitis externa.      Nose:  Mucosal edema present. No rhinorrhea, septal deviation or polyps. Turbinates abnormal and turbinate hypertrophy (3+, boggy, congested mucosa).  Right sinus exhibits no maxillary sinus tenderness and no frontal sinus tenderness. Left sinus exhibits no maxillary sinus tenderness and no frontal sinus tenderness.     Mouth/Throat  Oropharynx clear and moist without lesions or asymmetry, lips, teeth, and gums normal and oropharynx normal. No mucous membrane lesions.  No oropharyngeal exudate, posterior oropharyngeal edema or posterior oropharyngeal erythema. Mirror exam not performed due to patient tolerance.  Mirror exam not performed due to patient tolerance.      Neck:  Neck normal without thyromegaly masses, asymmetry, normal tracheal structure, crepitus, and tenderness, thyroid normal, trachea normal, phonation normal, full range of motion with neck supple and no adenopathy. No JVD present. Carotid bruit is not present. No thyroid mass and no thyromegaly present.     She has no cervical adenopathy.     Cardiovascular:    Normal rate, regular rhythm and rate and rhythm, heart sounds, and pulses normal.              Pulmonary/Chest:   Effort and breath sounds normal.     Psychiatric:   She has a normal mood and affect. Her speech is normal and behavior is normal.     Neurological:   She is alert and oriented to person, place, and time. She has neurological normal, alert and oriented. No cranial nerve deficit.     Skin:   No abrasions, lacerations, lesions, or rashes.       See separate procedure note for myringotomy and PE tube placement.        Tympanogram: Interpreted by me and reviewed with the patient today.   As tymp left.    Large volume and type B tymp right c/w PET.                 Assessment/Plan:      ICD-10-CM ICD-9-CM    1. Eustachian tube dysfunction, bilateral  H69.93 381.81       2. Otitic barotrauma, subsequent encounter  T70.0XXD V58.89      993.0       3. Chronic rhinitis  J31.0 472.0       4. Chronic otitis externa of both ears, unspecified type  H60.63 380.23           Continue nasacort and albuterol PRN.   PET placed today on left.  Ok to restart HBO.  Ciprodex drops to left ear BID for 7-10 days.     Follow up in 3-4 weeks for tube check.     Love Whaley MD  [facility]

## 2025-05-27 DIAGNOSIS — R11.0 NAUSEA: ICD-10-CM

## 2025-05-28 RX ORDER — PROMETHAZINE HYDROCHLORIDE 25 MG/1
25 TABLET ORAL EVERY 6 HOURS PRN
Qty: 90 TABLET | Refills: 5 | OUTPATIENT
Start: 2025-05-28

## 2025-05-29 ENCOUNTER — HOSPITAL ENCOUNTER (OUTPATIENT)
Facility: OTHER | Age: 65
Discharge: HOME | End: 2025-05-29
Attending: ANESTHESIOLOGY | Admitting: ANESTHESIOLOGY
Payer: MEDICARE

## 2025-05-29 VITALS
DIASTOLIC BLOOD PRESSURE: 68 MMHG | OXYGEN SATURATION: 99 % | HEIGHT: 61 IN | BODY MASS INDEX: 24.55 KG/M2 | WEIGHT: 130 LBS | HEART RATE: 70 BPM | RESPIRATION RATE: 16 BRPM | TEMPERATURE: 98 F | SYSTOLIC BLOOD PRESSURE: 133 MMHG

## 2025-05-29 DIAGNOSIS — G89.29 CHRONIC PAIN: ICD-10-CM

## 2025-05-29 DIAGNOSIS — M96.1 LUMBAR POST-LAMINECTOMY SYNDROME: Primary | ICD-10-CM

## 2025-05-29 PROCEDURE — 63600175 PHARM REV CODE 636 W HCPCS: Performed by: ANESTHESIOLOGY

## 2025-05-29 PROCEDURE — 63650 IMPLANT NEUROELECTRODES: CPT | Mod: ,,, | Performed by: ANESTHESIOLOGY

## 2025-05-29 PROCEDURE — 63650 IMPLANT NEUROELECTRODES: CPT | Performed by: ANESTHESIOLOGY

## 2025-05-29 PROCEDURE — 99152 MOD SED SAME PHYS/QHP 5/>YRS: CPT | Performed by: ANESTHESIOLOGY

## 2025-05-29 PROCEDURE — 63600175 PHARM REV CODE 636 W HCPCS: Performed by: STUDENT IN AN ORGANIZED HEALTH CARE EDUCATION/TRAINING PROGRAM

## 2025-05-29 PROCEDURE — C1778 LEAD, NEUROSTIMULATOR: HCPCS | Performed by: ANESTHESIOLOGY

## 2025-05-29 DEVICE — IMPLANTABLE DEVICE: Type: IMPLANTABLE DEVICE | Site: BACK | Status: FUNCTIONAL

## 2025-05-29 RX ORDER — MIDAZOLAM HYDROCHLORIDE 1 MG/ML
INJECTION INTRAMUSCULAR; INTRAVENOUS
Status: DISCONTINUED | OUTPATIENT
Start: 2025-05-29 | End: 2025-05-29 | Stop reason: HOSPADM

## 2025-05-29 RX ORDER — LIDOCAINE HYDROCHLORIDE 20 MG/ML
INJECTION, SOLUTION INFILTRATION; PERINEURAL
Status: DISCONTINUED | OUTPATIENT
Start: 2025-05-29 | End: 2025-05-29 | Stop reason: HOSPADM

## 2025-05-29 RX ORDER — CEFAZOLIN SODIUM 1 G/3ML
2 INJECTION, POWDER, FOR SOLUTION INTRAMUSCULAR; INTRAVENOUS
Status: COMPLETED | OUTPATIENT
Start: 2025-05-29 | End: 2025-05-29

## 2025-05-29 RX ORDER — SODIUM CHLORIDE 9 MG/ML
INJECTION, SOLUTION INTRAVENOUS CONTINUOUS
Status: DISCONTINUED | OUTPATIENT
Start: 2025-05-29 | End: 2025-05-29 | Stop reason: HOSPADM

## 2025-05-29 RX ORDER — FENTANYL CITRATE 50 UG/ML
INJECTION, SOLUTION INTRAMUSCULAR; INTRAVENOUS
Status: DISCONTINUED | OUTPATIENT
Start: 2025-05-29 | End: 2025-05-29 | Stop reason: HOSPADM

## 2025-05-29 RX ORDER — BUPIVACAINE HYDROCHLORIDE 2.5 MG/ML
INJECTION, SOLUTION EPIDURAL; INFILTRATION; INTRACAUDAL; PERINEURAL
Status: DISCONTINUED | OUTPATIENT
Start: 2025-05-29 | End: 2025-05-29 | Stop reason: HOSPADM

## 2025-05-29 RX ADMIN — CEFAZOLIN 2 G: 330 INJECTION, POWDER, FOR SOLUTION INTRAMUSCULAR; INTRAVENOUS at 08:05

## 2025-06-02 ENCOUNTER — TELEPHONE (OUTPATIENT)
Dept: PAIN MEDICINE | Facility: CLINIC | Age: 65
End: 2025-06-02
Payer: MEDICARE

## 2025-06-03 ENCOUNTER — PATIENT MESSAGE (OUTPATIENT)
Dept: OTOLARYNGOLOGY | Facility: CLINIC | Age: 65
End: 2025-06-03
Payer: MEDICARE

## 2025-06-03 ENCOUNTER — PATIENT MESSAGE (OUTPATIENT)
Dept: PAIN MEDICINE | Facility: CLINIC | Age: 65
End: 2025-06-03
Payer: MEDICARE

## 2025-06-03 ENCOUNTER — PATIENT MESSAGE (OUTPATIENT)
Dept: FAMILY MEDICINE | Facility: CLINIC | Age: 65
End: 2025-06-03
Payer: MEDICARE

## 2025-06-03 DIAGNOSIS — Z12.31 ENCOUNTER FOR MAMMOGRAM TO ESTABLISH BASELINE MAMMOGRAM: Primary | ICD-10-CM

## 2025-06-03 DIAGNOSIS — E87.6 HYPOKALEMIA: ICD-10-CM

## 2025-06-03 DIAGNOSIS — M79.18 MYOFASCIAL PAIN: ICD-10-CM

## 2025-06-03 DIAGNOSIS — G57.03 PIRIFORMIS SYNDROME OF BOTH SIDES: ICD-10-CM

## 2025-06-03 RX ORDER — POTASSIUM CHLORIDE 20 MEQ/1
20 TABLET, EXTENDED RELEASE ORAL 2 TIMES DAILY
Qty: 180 TABLET | Refills: 3 | Status: SHIPPED | OUTPATIENT
Start: 2025-06-03

## 2025-06-04 ENCOUNTER — OFFICE VISIT (OUTPATIENT)
Dept: PAIN MEDICINE | Facility: CLINIC | Age: 65
End: 2025-06-04
Payer: MEDICARE

## 2025-06-04 ENCOUNTER — PATIENT MESSAGE (OUTPATIENT)
Dept: PAIN MEDICINE | Facility: CLINIC | Age: 65
End: 2025-06-04
Payer: MEDICARE

## 2025-06-04 ENCOUNTER — TELEPHONE (OUTPATIENT)
Dept: PAIN MEDICINE | Facility: CLINIC | Age: 65
End: 2025-06-04
Payer: MEDICARE

## 2025-06-04 VITALS
TEMPERATURE: 100 F | OXYGEN SATURATION: 98 % | DIASTOLIC BLOOD PRESSURE: 71 MMHG | HEART RATE: 91 BPM | SYSTOLIC BLOOD PRESSURE: 103 MMHG | BODY MASS INDEX: 24.58 KG/M2 | WEIGHT: 130.06 LBS

## 2025-06-04 DIAGNOSIS — G89.4 CHRONIC PAIN DISORDER: ICD-10-CM

## 2025-06-04 DIAGNOSIS — M54.17 LUMBOSACRAL RADICULOPATHY: ICD-10-CM

## 2025-06-04 DIAGNOSIS — G89.4 CHRONIC PAIN SYNDROME: Primary | ICD-10-CM

## 2025-06-04 DIAGNOSIS — M96.1 POSTLAMINECTOMY SYNDROME OF LUMBAR REGION: ICD-10-CM

## 2025-06-04 PROCEDURE — 99999 PR PBB SHADOW E&M-EST. PATIENT-LVL V: CPT | Mod: PBBFAC,,, | Performed by: NURSE PRACTITIONER

## 2025-06-04 PROCEDURE — 3078F DIAST BP <80 MM HG: CPT | Mod: CPTII,S$GLB,, | Performed by: NURSE PRACTITIONER

## 2025-06-04 PROCEDURE — 99024 POSTOP FOLLOW-UP VISIT: CPT | Mod: S$GLB,,, | Performed by: NURSE PRACTITIONER

## 2025-06-04 PROCEDURE — 1159F MED LIST DOCD IN RCRD: CPT | Mod: CPTII,S$GLB,, | Performed by: NURSE PRACTITIONER

## 2025-06-04 PROCEDURE — 1160F RVW MEDS BY RX/DR IN RCRD: CPT | Mod: CPTII,S$GLB,, | Performed by: NURSE PRACTITIONER

## 2025-06-04 PROCEDURE — 3074F SYST BP LT 130 MM HG: CPT | Mod: CPTII,S$GLB,, | Performed by: NURSE PRACTITIONER

## 2025-06-04 RX ORDER — MORPHINE SULFATE 15 MG/1
15 TABLET, FILM COATED, EXTENDED RELEASE ORAL 2 TIMES DAILY
Qty: 60 TABLET | Refills: 0 | Status: SHIPPED | OUTPATIENT
Start: 2025-06-04

## 2025-06-04 RX ORDER — TIZANIDINE 4 MG/1
4 TABLET ORAL EVERY 6 HOURS PRN
Qty: 120 TABLET | Refills: 1 | Status: SHIPPED | OUTPATIENT
Start: 2025-06-04

## 2025-06-04 RX ORDER — MORPHINE SULFATE 15 MG/1
15 TABLET, FILM COATED, EXTENDED RELEASE ORAL 3 TIMES DAILY
Qty: 90 TABLET | Refills: 0 | Status: SHIPPED | OUTPATIENT
Start: 2025-06-06 | End: 2025-06-04

## 2025-06-09 ENCOUNTER — PATIENT MESSAGE (OUTPATIENT)
Dept: OTOLARYNGOLOGY | Facility: CLINIC | Age: 65
End: 2025-06-09
Payer: MEDICARE

## 2025-06-09 NOTE — TELEPHONE ENCOUNTER
I am sorry that you all her both not feeling well.  Chances are its COVID, as I know Mannie tested positive in the last couple of weeks.    If the ears are draining, you should restart using some eardrops.  The ones previously prescribed after the tube placement would be fine, but let me know if you do not have any more of those to use.    I do not think I have any openings on my schedule today, but I will defer to my staff to look and see what the possibility is of being seen in the coming days, though I know my schedule is fairly full.  I can offer an E visit similar to what I did last week for your , if you would like.  Another option would be urgent care or your PCP office.  They should be able to assess, test for COVID or other viruses.    Love Whaley MD  [facility]    This note was created by combination of typed  and MModal dictation.  Transcription errors may be present.  If there are any questions, please contact me.

## 2025-06-10 ENCOUNTER — PATIENT MESSAGE (OUTPATIENT)
Dept: PAIN MEDICINE | Facility: CLINIC | Age: 65
End: 2025-06-10
Payer: MEDICARE

## 2025-06-10 DIAGNOSIS — G89.4 CHRONIC PAIN DISORDER: ICD-10-CM

## 2025-06-10 RX ORDER — MORPHINE SULFATE 15 MG/1
15 TABLET, FILM COATED, EXTENDED RELEASE ORAL 3 TIMES DAILY
Qty: 90 TABLET | Refills: 0 | Status: SHIPPED | OUTPATIENT
Start: 2025-06-30 | End: 2025-07-30

## 2025-06-10 NOTE — TELEPHONE ENCOUNTER
Staff spoke with patient to further assist with portal message. Staff informed patient that her original prescription will be set for a fill date of June 30th

## 2025-06-12 ENCOUNTER — PATIENT MESSAGE (OUTPATIENT)
Dept: FAMILY MEDICINE | Facility: CLINIC | Age: 65
End: 2025-06-12
Payer: MEDICARE

## 2025-06-13 RX ORDER — OSELTAMIVIR PHOSPHATE 75 MG/1
75 CAPSULE ORAL 2 TIMES DAILY
Qty: 20 CAPSULE | Refills: 0 | Status: SHIPPED | OUTPATIENT
Start: 2025-06-13 | End: 2025-06-23

## 2025-06-13 NOTE — TELEPHONE ENCOUNTER
Patient is wanting to know can you please call in [employer] for her. Her  recently took an at home combination test and he tested positive for Flu A. Pt is wanting to take medication, so that she does not get sick. Please advise patient message.

## 2025-06-15 RX ORDER — FLUCONAZOLE 100 MG/1
100 TABLET ORAL DAILY
Qty: 14 TABLET | Refills: 3 | Status: SHIPPED | OUTPATIENT
Start: 2025-06-15 | End: 2025-07-15

## 2025-06-16 ENCOUNTER — PATIENT MESSAGE (OUTPATIENT)
Dept: OTOLARYNGOLOGY | Facility: CLINIC | Age: 65
End: 2025-06-16
Payer: MEDICARE

## 2025-06-16 NOTE — TELEPHONE ENCOUNTER
Unfortunately, until the nasal symptoms clear, the ear symptoms may be persistent.  I would definitely continue using the eardrops.  If you complete the cefdinir and did not feel the symptoms have resolved, then may need to consider a different course of antibiotics for the sinus issue.    I can see if I have any availability this week to see you to check the ears.    Love Whaley MD  [facility]    This note was created by combination of typed  and MModal dictation.  Transcription errors may be present.  If there are any questions, please contact me.

## 2025-06-17 ENCOUNTER — OFFICE VISIT (OUTPATIENT)
Dept: OTOLARYNGOLOGY | Facility: CLINIC | Age: 65
End: 2025-06-17
Payer: MEDICARE

## 2025-06-17 ENCOUNTER — PATIENT MESSAGE (OUTPATIENT)
Dept: OPTOMETRY | Facility: CLINIC | Age: 65
End: 2025-06-17
Payer: MEDICARE

## 2025-06-17 VITALS
WEIGHT: 145.31 LBS | SYSTOLIC BLOOD PRESSURE: 103 MMHG | DIASTOLIC BLOOD PRESSURE: 65 MMHG | HEART RATE: 90 BPM | BODY MASS INDEX: 27.45 KG/M2

## 2025-06-17 DIAGNOSIS — H04.123 BILATERAL DRY EYES: ICD-10-CM

## 2025-06-17 DIAGNOSIS — J06.9 UPPER RESPIRATORY TRACT INFECTION, UNSPECIFIED TYPE: ICD-10-CM

## 2025-06-17 DIAGNOSIS — J40 BRONCHITIS: ICD-10-CM

## 2025-06-17 DIAGNOSIS — H69.93 DYSFUNCTION OF BOTH EUSTACHIAN TUBES: Chronic | ICD-10-CM

## 2025-06-17 DIAGNOSIS — J31.0 CHRONIC RHINITIS: Chronic | ICD-10-CM

## 2025-06-17 DIAGNOSIS — J01.01 ACUTE RECURRENT MAXILLARY SINUSITIS: Chronic | ICD-10-CM

## 2025-06-17 DIAGNOSIS — Z96.22 S/P TYMPANOSTOMY TUBE PLACEMENT: ICD-10-CM

## 2025-06-17 DIAGNOSIS — H92.11 OTORRHEA OF RIGHT EAR: Primary | ICD-10-CM

## 2025-06-17 PROCEDURE — 3074F SYST BP LT 130 MM HG: CPT | Mod: CPTII,S$GLB,, | Performed by: OTOLARYNGOLOGY

## 2025-06-17 PROCEDURE — 87102 FUNGUS ISOLATION CULTURE: CPT | Performed by: OTOLARYNGOLOGY

## 2025-06-17 PROCEDURE — 1159F MED LIST DOCD IN RCRD: CPT | Mod: CPTII,S$GLB,, | Performed by: OTOLARYNGOLOGY

## 2025-06-17 PROCEDURE — 87070 CULTURE OTHR SPECIMN AEROBIC: CPT | Performed by: OTOLARYNGOLOGY

## 2025-06-17 PROCEDURE — 3078F DIAST BP <80 MM HG: CPT | Mod: CPTII,S$GLB,, | Performed by: OTOLARYNGOLOGY

## 2025-06-17 PROCEDURE — 99999 PR PBB SHADOW E&M-EST. PATIENT-LVL V: CPT | Mod: PBBFAC,,, | Performed by: OTOLARYNGOLOGY

## 2025-06-17 PROCEDURE — 87075 CULTR BACTERIA EXCEPT BLOOD: CPT | Performed by: OTOLARYNGOLOGY

## 2025-06-17 PROCEDURE — 3008F BODY MASS INDEX DOCD: CPT | Mod: CPTII,S$GLB,, | Performed by: OTOLARYNGOLOGY

## 2025-06-17 PROCEDURE — 92504 EAR MICROSCOPY EXAMINATION: CPT | Mod: S$GLB,,, | Performed by: OTOLARYNGOLOGY

## 2025-06-17 PROCEDURE — 99214 OFFICE O/P EST MOD 30 MIN: CPT | Mod: 25,S$GLB,, | Performed by: OTOLARYNGOLOGY

## 2025-06-17 RX ORDER — CIPROFLOXACIN AND DEXAMETHASONE 3; 1 MG/ML; MG/ML
4 SUSPENSION/ DROPS AURICULAR (OTIC) 2 TIMES DAILY
Qty: 7.5 ML | Refills: 0 | Status: SHIPPED | OUTPATIENT
Start: 2025-06-17 | End: 2025-06-27

## 2025-06-17 RX ORDER — CYCLOSPORINE 0.5 MG/ML
1 EMULSION OPHTHALMIC 2 TIMES DAILY
Qty: 180 EACH | Refills: 3 | Status: SHIPPED | OUTPATIENT
Start: 2025-06-17 | End: 2025-09-15

## 2025-06-17 RX ORDER — AZELASTINE 1 MG/ML
1 SPRAY, METERED NASAL 2 TIMES DAILY
Qty: 30 ML | Refills: 11 | Status: SHIPPED | OUTPATIENT
Start: 2025-06-17 | End: 2025-08-06

## 2025-06-17 RX ORDER — LEVOFLOXACIN 500 MG/1
500 TABLET, FILM COATED ORAL DAILY
Qty: 14 TABLET | Refills: 0 | Status: SHIPPED | OUTPATIENT
Start: 2025-06-17 | End: 2025-07-01

## 2025-06-17 RX ORDER — TRIAMCINOLONE ACETONIDE 55 UG/1
2 SPRAY, METERED NASAL DAILY
Qty: 16.9 ML | Refills: 5 | Status: SHIPPED | OUTPATIENT
Start: 2025-06-17

## 2025-06-17 NOTE — PATIENT INSTRUCTIONS
See allergy/immunology for frequent infections and asthma.      Restart nasacort and azelastine NS.   Change antibiotic to levaquin.  Hold cefdinir for now.   Change to ciprodex ear drops.    Cultures sent for right ear- will follow up and let pt know about changes to medications.     Saline nasal irrigations.      OK to try HBO treatments, as PE tubes appear patent.     How do you use a Nasal Corticosteroid Spray?    Make sure you understand your dosing instructions. Spray only the number of prescribed sprays in each nostril. Read the package instructions before using your spray the first time.    Most corticosteroid sprays suggest the following steps:    Wash your hands well.    Gently blow your nose to clear the passageway.    Shake the container several times.    Tilt your head slightly downward.  Use the opposite hand from the nostril you will be spraying to hold the spray bottle.    Block one nostril with your finger.  Insert the nasal applicator into the other nostril.    Aim the spray toward the outer wall of the nostril.  Inhale slowly through the nose and press the .    Breathe out and repeat to apply the prescribed number of sprays.  Repeat these steps for the other nostril.     Avoid sneezing or blowing your nose right after spraying.

## 2025-06-17 NOTE — PROGRESS NOTES
Chief Complaint   Patient presents with    Ear Fullness     Constant fullness        HPI:  Patient is a 64 y.o. female who has previously seen me for chronic rhinitis, chronic otitis externa, right ear canal lesion, previous sialoadenitis.  At last visit she was treated with Ciprodex otic drops for right ear canal inflammation, and mupirocin for left nasal vestibule inflammation and small lesion.  She presents today for follow-up to re-examine these areas and assess response to therapy.    Since the last visit, the patient reports the nasal crusting and irritation has improved though does occasionally flare up.  She used Ciprodex drops on the right ear for short time, but they were causing pain with use so she stopped.  She has not noticed any otorrhea or other ongoing issues with the right ear, though she occasionally has mild discomfort.  She does complain of purulent nasal secretions and thick postnasal drip over the last few weeks.  She has some sinus pressure and ear pressure as well.  She does report a history of allergic rhinitis symptoms in the past and has had some occasional sinusitis when allergies flare.      Interval HPI 11/26/2024 :      Patient reports she has not had any significant otorrhea from the right.  She still continues to use Q-tips in the ears and does notice some scant blood in the ear when she does this.  Previous cultures from last visit did not grow any bacteria or fungus.    She also reports mild increase in her rhinitis symptoms over the last few days.  She uses flonase and azelastine occasionally, and she uses zyrtec, but antihistamines cause significant drying of her eyes.    Interval HPI 02/13/2025 :      Patient was using [employer] eardrops and felt the issue was improving so she had stopped use recently.  She does not report any changes or worsening of the right ear symptoms.  She has had an increase in her allergy/rhinitis symptoms over the last day.        Component 2  "mo ago   Aerobic Bacterial Culture  Abnormal   STAPHYLOCOCCUS AUREUS  Many    Resulting Agency [facility]        Susceptibility     Staphylococcus aureus     CULTURE, AEROBIC  (SPECIFY SOURCE)     Clindamycin <=0.5 mcg/mL Resistant     Erythromycin >4 mcg/mL Resistant     Oxacillin <=0.25 mcg/mL Sensitive     Tetracycline <=4 mcg/mL Sensitive     Trimeth/Sulfa <=0.5/9.5 m... Sensitive                   Interval HPI 03/20/2025 :      Patient started hyperbaric O2 treatments and noted to have possible serous effusion after first "dive".  She was able to depressurize her left ear but not her right during her descent at her HBO treatment.  Afterwards, the nurse noted erythema and effusion in the right ME with trace bloody effusion.   She feels it has improved since a few days ago, but she still cannot get the ear to pop.   She has not been using nasacort due to eye side effects.    Interval HPI 03/24/2025 :    Took steroids, no significant improvement in symptoms.  Here for PET placement for HBO.  Right ear still feels clogged.  No pain.    Interval HPI 04/14/2025 :    Patient had PET placed in right ear at last visit.   Patient complains that she feels her hearing is off in the right ear since her 1st hyperbaric treatment.  She used eardrops after placement if her PE tube at last visit, but has not continued to use these past the 1 week harsha.  She does not report any otorrhea or ear pain.  She has continued hyperbaric oxygen treatments and is able to easily Depressurize the left ear.    Interval HPI 05/12/2025 :      Patient had been doing well with hyperbaric oxygen, but approximately 1-2 weeks ago she began having upper respiratory infection symptoms.  Nasal congestion, postnasal drip, cough.  Symptoms progressed to bronchitis and she was seen by urgent care who placed her on azithromycin.  She has been using her nebulizer treatments and does feel slightly improved.  During that time, she attempted hyperbaric oxygen " treatments, but had acute pain in the left ear and ear and down into the left upper neck.  She stopped HBO, and they wanted further evaluation of the ear prior to restarting.  She does report some scant bloody otorrhea from the left, muffled hearing currently.    Interval HPI 05/26/2025 :      Ear is better today, still feels muffled and hearing stiff feels decreased on left side.     She is supposed to resume HBO later this week.     Interval HPI 06/17/2025 :      CHIEF COMPLAINT:  - Patient presents with ongoing right-sided ear drainage and hearing issues in the right ear, as well as recurrent sinus problems and respiratory infections.    HPI:  Patient reports ongoing issues with her right ear, including persistent drainage and a sensation of blockage. The blockage temporarily clears when leaning down but returns upon standing. The drainage requires sleeping with a towel on her pillow, and she observes crusting on the outside of the ear in the morning.    She also reports ongoing sinus problems, with persistent green nasal discharge from the left side after antibiotic treatment. She has been taking Cefdinir and using ear drops twice daily for the past week. She developed bronchitis symptoms about a week ago.  Her  had COVID and the flu and she got sick while he was also sick.    Patient describes facial pain, which was particularly severe the previous night. She denies fever but reports feeling unwell, attributing it to environmental factors. She has a history of recurrent respiratory infections and bronchitis, with no occurrences since 2022.    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 She is on an immunosuppressant drug, which may contribute to her susceptibility to infections.     Regarding treatments, she has been using Ciprodex or ofloxacin ear drops in both ears. She takes half a Benadryl  nightly and uses Mucinex for her sinus problems. In the past, she has used nasal sprays such as Nasacort and Astelin, which she found beneficial.              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    Thyroid nodule 08/22/2013    Hyperlipidemia 08/22/2013    Mild intermittent asthma without complication 11/04/2015    Rheumatoid arthritis involving multiple sites 11/16/2016    Iron deficiency anemia due to chronic blood loss 11/14/2017    Sjogren's syndrome 05/21/2018    Candidal esophagitis 10/15/2018    Coronary artery disease involving native coronary artery of native heart without angina pectoris 07/16/2019    Subclinical hyperthyroidism 02/21/2020    Dry eyes     Ureterocele     Spinal stenosis, lumbar region without neurogenic claudication 07/23/2021    History of methotrexate therapy 01/19/2022    NAFLD (nonalcoholic fatty liver disease) 12/29/2022    Chronic pain 12/29/2022    Hypokalemia 06/24/2023    Arthrodesis status 06/29/2023    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 01/11/2024    Nausea 04/15/2024    Acute deep vein thrombosis (DVT) of right peroneal vein 07/22/2024     Resolved Ambulatory Problems     Diagnosis Date Noted    Osteoarthritis involving multiple joints on both sides of body 08/01/2012    Coronary artery disease 02/06/2013    Posterior tibial tendinitis of left leg 03/08/2013    Lumbar spondylosis 03/08/2013    CS (cervical spondylosis) 03/08/2013    Osteoarthritis of both knees 03/08/2013    Occipital neuralgia 08/13/2014    Osteoarthritis of CMC joint of thumb 11/13/2014    URTI (acute upper respiratory infection) 07/28/2015    Umbilical hernia 08/13/2015    CMC (carpometacarpal) synovitis 10/19/2015    AR (allergic rhinitis) 11/04/2015    Iron deficiency anemia 11/11/2015    Vomiting 11/23/2015    Viral gastroenteritis 11/23/2015    Sepsis 11/23/2015    SIRS  due to infectious process with organ dysfunction 11/23/2015    Intractable vomiting with nausea 11/23/2015    Volume depletion, gastrointestinal loss 11/23/2015    Immunosuppressed status 03/03/2016    Lateral meniscus derangement 04/06/2016    Lateral epicondylitis of right elbow 08/10/2016    Pleurisy 11/01/2016    Thoracic back pain 02/07/2017    Low back pain 02/07/2017    Carpal tunnel syndrome of right wrist 04/04/2017    Elevated parathyroid hormone 05/10/2017    Low TSH level 05/10/2017    Hypogammaglobulinemia 05/10/2017    Right carpal tunnel syndrome 05/29/2017    Iron deficiency anemia due to chronic blood loss 06/29/2017    Pain in right hand 06/30/2017    Pain in right elbow 06/30/2017    Range of motion deficit 06/30/2017    Decreased  strength of right hand 06/30/2017    Right shoulder pain 08/21/2017    Pure hypercholesterolemia 01/08/2018    Research study patient 04/30/2018    Left elbow pain 06/20/2018    Rupture of left triceps tendon 10/17/2018    Pain in left elbow 11/01/2018    Stiffness of left elbow joint 01/10/2019    Weakness of left arm 01/10/2019    Left hip pain 03/06/2019    Acute hip pain, left 03/20/2019    Gait abnormality 03/20/2019    Greater trochanteric bursitis of left hip 05/13/2019    Stress fracture of neck of left femur 05/20/2019    Left leg weakness 06/06/2019    Impaired functional mobility, balance, gait, and endurance 06/13/2019    Buttock pain 09/27/2019    Difficulty walking 12/05/2019    ROBERT (iron deficiency anemia) 01/24/2020    Fibromyalgia 03/12/2020    Recurrent UTI     Atrophic vaginitis     Drug-induced constipation     Dry mouth     Nocturia     Urinary frequency     Urge urinary incontinence     RADHA (stress urinary incontinence, female)     Elevated serum GGT level 03/17/2021    History of long term use of Methotrexate  03/17/2021    Transaminasemia 03/17/2021    Chronic pain 03/29/2021    Acute pain of left shoulder 04/26/2021    Osteoarthritis of  lumbar spine 08/23/2021    Decreased range of motion with decreased strength 10/05/2021    Impaired mobility and activities of daily living 10/05/2021    Decreased strength of lower extremity 10/08/2021    Healthcare maintenance 01/18/2022    Abnormal finding of blood chemistry, unspecified  01/19/2022    Stress fracture of left foot 06/28/2022    Primary osteoarthritis, left ankle and foot 06/28/2022    Pes planovalgus, acquired, left 06/28/2022    Degenerative joint disease of hindfoot, left 06/28/2022    Decreased strength, endurance, and mobility 07/05/2022    Impaired tolerance of activity 07/05/2022    Current chronic use of systemic steroids 12/29/2022    Hormone replacement therapy (HRT) 12/29/2022    Edema 12/29/2022    Leukopenia 12/29/2022    History of COVID-19 12/29/2022    Neuropathic pain of left foot 02/03/2023    Impaired gait and mobility 03/01/2023    Lumbar stenosis 06/23/2023    Lower extremity edema 06/23/2023    Cutaneous abscess of right knee 06/27/2023    Long term (current) use of inhaled steroids 06/29/2023    History of falling 06/29/2023    Encounter for other orthopedic aftercare 06/29/2023    Normocytic anemia 06/29/2023    Age-related osteoporosis without current pathological fracture 06/29/2023    Long-term use of high-risk medication 11/07/2023    S/P lumbar fusion 07/31/2024     Past Medical History:   Diagnosis Date    Acid reflux     Allergy     Anemia     Asthma     Degenerative disc disease     Lobular carcinoma in situ     Osteoarthritis     Osteoporosis     Rheumatoid arthritis(714.0)        Review of Systems  General: negative for chills, fever or weight loss  Psychological: negative for mood changes or depression  Ophthalmic: negative for blurry vision, photophobia or eye pain  ENT: see HPI  Respiratory: no cough, shortness of breath, or wheezing  Cardiovascular: no chest pain or dyspnea on exertion  Gastrointestinal: no abdominal pain, change in bowel habits, or black/  bloody stools  Musculoskeletal: negative for gait disturbance or muscular weakness  Neurological: no syncope or seizures; no ataxia  Dermatological: negative for pruritis, rash and jaundice  Hematologic/lymphatic: no easy bruising, no new adenopathy    Physical Exam     Vitals:    06/17/25 1311   BP: 103/65   Pulse: 90                   Constitutional:   She is oriented to person, place, and time. Vital signs are normal. She appears well-developed and well-nourished. She appears alert. She is cooperative. Normal speech.      Head:  Normocephalic and atraumatic. Salivary glands normal.  Facial strength is normal.      Ears:    Right Ear: There is drainage. Right ear tenderness: mild tenderness in lateral portion EAC near where crusted area located.Tympanic membrane is not perforated, not erythematous and not retracted. No middle ear effusion (scant, bloody). A PE tube is seen.   Left Ear: No drainage. Tympanic membrane is not perforated, not erythematous and not retracted.  No middle ear effusion (scant serous). A PE tube (In place and patent) is seen.   Ears:      Nose:  Mucosal edema present. No rhinorrhea, septal deviation or polyps. Turbinates abnormal and turbinate hypertrophy (3+, boggy, congested mucosa).  Right sinus exhibits no maxillary sinus tenderness and no frontal sinus tenderness. Left sinus exhibits no maxillary sinus tenderness and no frontal sinus tenderness.     Mouth/Throat  Oropharynx clear and moist without lesions or asymmetry, normal uvula midline, lips, teeth, and gums normal and oropharynx normal. No uvula swelling, oral lesions, trismus or mucous membrane lesions. No oropharyngeal exudate, posterior oropharyngeal edema or posterior oropharyngeal erythema. Mirror exam not performed due to patient tolerance.  Mirror exam not performed due to patient tolerance.      Neck:  Neck normal without thyromegaly masses, asymmetry, normal tracheal structure, crepitus, and tenderness, thyroid normal,  trachea normal, phonation normal, full range of motion with neck supple and no adenopathy. No JVD present. Carotid bruit is not present. No thyroid mass and no thyromegaly present.     She has no cervical adenopathy.     Cardiovascular:    Normal rate, regular rhythm and rate and rhythm, heart sounds, and pulses normal.              Pulmonary/Chest:   Effort and breath sounds normal.     Psychiatric:   She has a normal mood and affect. Her speech is normal and behavior is normal.     Neurological:   She is alert and oriented to person, place, and time. She has neurological normal, alert and oriented. No cranial nerve deficit.     Skin:   No abrasions, lacerations, lesions, or rashes.       Binocular Microscopy    Indication:  Assess PE tube position; otorrhea    Additional detail was required for the otoscopic examination of the right ear.  The operating microscope was used to directly visualize the tympanic membrane and middle ear landmarks.  Findings:  Canal skin:  Area of skin excoriation stable, no significant bony exposure; culture taken of otorrhea in EAC   TM:  PE tube in place and patent, no active otorrhea   Ossicles:  normal   Effusion:  none    Suctioned and cleared dried otorrhea from EAC.  Skin slightly erythematous.  The patient tolerated the procedure well.      Previous Audiograms/Tympanograms:                  Assessment/Plan:      ICD-10-CM ICD-9-CM    1. Otorrhea of right ear  H92.11 388.60 Aerobic culture      Culture, Anaerobic      Fungus culture      2. Chronic rhinitis  J31.0 472.0 triamcinolone (NASACORT) 55 mcg nasal inhaler      azelastine (ASTELIN) 137 mcg (0.1 %) nasal spray      3. Bronchitis  J40 490 levoFLOXacin (LEVAQUIN) 500 MG tablet      Ambulatory referral/consult to Allergy      4. Acute recurrent maxillary sinusitis  J01.01 461.0 triamcinolone (NASACORT) 55 mcg nasal inhaler      azelastine (ASTELIN) 137 mcg (0.1 %) nasal spray      levoFLOXacin (LEVAQUIN) 500 MG tablet       Ambulatory referral/consult to Allergy      5. Dysfunction of both eustachian tubes  H69.93 381.81       6. Upper respiratory tract infection, unspecified type  J06.9 465.9       7. S/P tympanostomy tube placement  Z96.22 V45.89               EAR ISSUES (OTORRHEA AND OTITIS MEDIA):  - Assessed right ear tube patency and drainage; tube appears functional but ear has ongoing drainage issues.  - Started Ciprodex, hold ofloxacin.  - Ear culture obtained.  - Started Levaquin (oral antibiotic) and discontinued current ear drops while on Levaquin.  - Will communicate any necessary medication changes based on culture results.    EUSTACHIAN TUBE DYSFUNCTION:  - Discussed potential for Eustachian tube dysfunction contributing to ear fullness sensation.  - Explained relationship between nasal/sinus problems and ear problems via Eustachian tube.  - Patient cleared to try hyperbaric treatments again, stopping if pain occurs (cleared as patent tube should allow pressure equalization).    CHRONIC SINUSITIS:  - chronic nasal and sinus problems are contributing to recurrent ear problems.  - Considered change in antibiotic therapy due to persistent green nasal discharge.  - Patient to perform saline nasal rinses, especially on the left side, to flush out mucus.  - Started Nasacort nasal spray nightly and Astelin nasal spray (optional, can be used in addition to Nasacort if needed after 1-2 weeks).  - Continued Benadryl p.r.n.    IMMUNODEFICIENCY:  - Referred to allergy/immunology for evaluation of recurrent respiratory infections and frequent infections in context of immunosuppression.    FOLLOW-UP:  - Follow up in approximately 3 weeks (after July 4th).  - Contact office if symptoms worsen in the next few days.           Love Whaley MD  [facility]          This note was generated with the assistance of ambient listening technology. Verbal consent was obtained by the patient and accompanying visitor(s) for  the recording of patient appointment to facilitate this note. I attest to having reviewed and edited the generated note for accuracy, though some syntax or spelling errors may persist. Please contact the author of this note for any clarification.

## 2025-06-18 ENCOUNTER — RESULTS FOLLOW-UP (OUTPATIENT)
Dept: RHEUMATOLOGY | Facility: CLINIC | Age: 65
End: 2025-06-18

## 2025-06-18 ENCOUNTER — OFFICE VISIT (OUTPATIENT)
Dept: RHEUMATOLOGY | Facility: CLINIC | Age: 65
End: 2025-06-18
Payer: MEDICARE

## 2025-06-18 ENCOUNTER — LAB VISIT (OUTPATIENT)
Dept: LAB | Facility: HOSPITAL | Age: 65
End: 2025-06-18
Attending: INTERNAL MEDICINE
Payer: MEDICARE

## 2025-06-18 ENCOUNTER — TELEPHONE (OUTPATIENT)
Dept: RHEUMATOLOGY | Facility: CLINIC | Age: 65
End: 2025-06-18

## 2025-06-18 VITALS
SYSTOLIC BLOOD PRESSURE: 123 MMHG | WEIGHT: 139 LBS | HEART RATE: 80 BPM | DIASTOLIC BLOOD PRESSURE: 73 MMHG | BODY MASS INDEX: 26.26 KG/M2

## 2025-06-18 DIAGNOSIS — I25.10 ATHEROSCLEROTIC HEART DISEASE OF NATIVE CORONARY ARTERY WITHOUT ANGINA PECTORIS: ICD-10-CM

## 2025-06-18 DIAGNOSIS — M06.9 RHEUMATOID ARTHRITIS, INVOLVING UNSPECIFIED SITE, UNSPECIFIED WHETHER RHEUMATOID FACTOR PRESENT: ICD-10-CM

## 2025-06-18 DIAGNOSIS — M06.9 RHEUMATOID ARTHRITIS: ICD-10-CM

## 2025-06-18 DIAGNOSIS — R26.81 GAIT INSTABILITY: ICD-10-CM

## 2025-06-18 DIAGNOSIS — M96.1 POST LAMINECTOMY SYNDROME: ICD-10-CM

## 2025-06-18 DIAGNOSIS — R74.8 ALKALINE PHOSPHATASE ELEVATION: Primary | ICD-10-CM

## 2025-06-18 DIAGNOSIS — M47.816 LUMBAR SPONDYLOSIS: Primary | ICD-10-CM

## 2025-06-18 LAB
ABSOLUTE EOSINOPHIL (OHS): 0.02 K/UL
ABSOLUTE MONOCYTE (OHS): 0.58 K/UL (ref 0.3–1)
ABSOLUTE NEUTROPHIL COUNT (OHS): 5.69 K/UL (ref 1.8–7.7)
ALBUMIN SERPL BCP-MCNC: 3.6 G/DL (ref 3.5–5.2)
ALP SERPL-CCNC: 232 UNIT/L (ref 40–150)
ALT SERPL W/O P-5'-P-CCNC: 15 UNIT/L (ref 10–44)
ANION GAP (OHS): 9 MMOL/L (ref 8–16)
AST SERPL-CCNC: 19 UNIT/L (ref 11–45)
BASOPHILS # BLD AUTO: 0.07 K/UL
BASOPHILS NFR BLD AUTO: 0.9 %
BILIRUB SERPL-MCNC: 0.3 MG/DL (ref 0.1–1)
BUN SERPL-MCNC: 13 MG/DL (ref 8–23)
CALCIUM SERPL-MCNC: 9.1 MG/DL (ref 8.7–10.5)
CHLORIDE SERPL-SCNC: 106 MMOL/L (ref 95–110)
CO2 SERPL-SCNC: 24 MMOL/L (ref 23–29)
CREAT SERPL-MCNC: 0.6 MG/DL (ref 0.5–1.4)
CRP SERPL-MCNC: 3 MG/L
ERYTHROCYTE [DISTWIDTH] IN BLOOD BY AUTOMATED COUNT: 16.3 % (ref 11.5–14.5)
ERYTHROCYTE [SEDIMENTATION RATE] IN BLOOD BY PHOTOMETRIC METHOD: 25 MM/HR
GFR SERPLBLD CREATININE-BSD FMLA CKD-EPI: >60 ML/MIN/1.73/M2
GLUCOSE SERPL-MCNC: 81 MG/DL (ref 70–110)
HCT VFR BLD AUTO: 36.7 % (ref 37–48.5)
HGB BLD-MCNC: 11.1 GM/DL (ref 12–16)
IMM GRANULOCYTES # BLD AUTO: 0.02 K/UL (ref 0–0.04)
IMM GRANULOCYTES NFR BLD AUTO: 0.3 % (ref 0–0.5)
LYMPHOCYTES # BLD AUTO: 1.22 K/UL (ref 1–4.8)
MCH RBC QN AUTO: 26.2 PG (ref 27–31)
MCHC RBC AUTO-ENTMCNC: 30.2 G/DL (ref 32–36)
MCV RBC AUTO: 87 FL (ref 82–98)
NUCLEATED RBC (/100WBC) (OHS): 0 /100 WBC
PLATELET # BLD AUTO: 348 K/UL (ref 150–450)
PMV BLD AUTO: 10.8 FL (ref 9.2–12.9)
POTASSIUM SERPL-SCNC: 4.6 MMOL/L (ref 3.5–5.1)
PROT SERPL-MCNC: 6.6 GM/DL (ref 6–8.4)
RBC # BLD AUTO: 4.24 M/UL (ref 4–5.4)
RELATIVE EOSINOPHIL (OHS): 0.3 %
RELATIVE LYMPHOCYTE (OHS): 16.1 % (ref 18–48)
RELATIVE MONOCYTE (OHS): 7.6 % (ref 4–15)
RELATIVE NEUTROPHIL (OHS): 74.8 % (ref 38–73)
SODIUM SERPL-SCNC: 139 MMOL/L (ref 136–145)
WBC # BLD AUTO: 7.6 K/UL (ref 3.9–12.7)

## 2025-06-18 PROCEDURE — 86140 C-REACTIVE PROTEIN: CPT

## 2025-06-18 PROCEDURE — 3074F SYST BP LT 130 MM HG: CPT | Mod: CPTII,S$GLB,, | Performed by: INTERNAL MEDICINE

## 2025-06-18 PROCEDURE — G2211 COMPLEX E/M VISIT ADD ON: HCPCS | Mod: S$GLB,,, | Performed by: INTERNAL MEDICINE

## 2025-06-18 PROCEDURE — 3078F DIAST BP <80 MM HG: CPT | Mod: CPTII,S$GLB,, | Performed by: INTERNAL MEDICINE

## 2025-06-18 PROCEDURE — 85025 COMPLETE CBC W/AUTO DIFF WBC: CPT

## 2025-06-18 PROCEDURE — 99214 OFFICE O/P EST MOD 30 MIN: CPT | Mod: S$GLB,,, | Performed by: INTERNAL MEDICINE

## 2025-06-18 PROCEDURE — 3008F BODY MASS INDEX DOCD: CPT | Mod: CPTII,S$GLB,, | Performed by: INTERNAL MEDICINE

## 2025-06-18 PROCEDURE — 1159F MED LIST DOCD IN RCRD: CPT | Mod: CPTII,S$GLB,, | Performed by: INTERNAL MEDICINE

## 2025-06-18 PROCEDURE — 85652 RBC SED RATE AUTOMATED: CPT

## 2025-06-18 PROCEDURE — 99999 PR PBB SHADOW E&M-EST. PATIENT-LVL V: CPT | Mod: PBBFAC,,, | Performed by: INTERNAL MEDICINE

## 2025-06-18 PROCEDURE — 80053 COMPREHEN METABOLIC PANEL: CPT

## 2025-06-18 PROCEDURE — 36415 COLL VENOUS BLD VENIPUNCTURE: CPT

## 2025-06-18 RX ORDER — PREDNISONE 5 MG/1
5 TABLET ORAL DAILY
Qty: 90 TABLET | Refills: 1 | Status: SHIPPED | OUTPATIENT
Start: 2025-06-18

## 2025-06-18 RX ORDER — PREDNISONE 1 MG/1
1 TABLET ORAL DAILY
Qty: 90 TABLET | Refills: 1 | Status: SHIPPED | OUTPATIENT
Start: 2025-06-18

## 2025-06-18 RX ORDER — LEFLUNOMIDE 20 MG/1
20 TABLET ORAL DAILY
Qty: 90 TABLET | Refills: 0 | Status: SHIPPED | OUTPATIENT
Start: 2025-06-18

## 2025-06-18 RX ORDER — ROSUVASTATIN CALCIUM 20 MG/1
20 TABLET, COATED ORAL NIGHTLY
Qty: 90 TABLET | Refills: 3 | Status: SHIPPED | OUTPATIENT
Start: 2025-06-18

## 2025-06-18 NOTE — PROGRESS NOTES
I have personally taken the history and examined the patient and agree with the resident,s note as stated above       Latest Reference Range & Units 04/17/25 12:16   WBC 3.90 - 12.70 K/uL  3.90 - 12.70 K/uL 5.66  5.71   RBC 4.00 - 5.40 M/uL  4.00 - 5.40 M/uL 4.08  4.04   Hemoglobin 12.0 - 16.0 gm/dL  12.0 - 16.0 gm/dL 11.0 (L)  11.1 (L)   Hematocrit 37.0 - 48.5 %  37.0 - 48.5 % 35.5 (L)  35.1 (L)   MCV 82 - 98 fL  82 - 98 fL 87  87   MCH 27.0 - 31.0 pg  27.0 - 31.0 pg 27.0  27.5   MCHC 32.0 - 36.0 g/dL  32.0 - 36.0 g/dL 31.0 (L)  31.6 (L)   RDW 11.5 - 14.5 %  11.5 - 14.5 % 15.7 (H)  15.7 (H)   Platelet Count 150 - 450 K/uL  150 - 450 K/uL 293  288   MPV 9.2 - 12.9 fL  9.2 - 12.9 fL 10.5  10.5   Neut % 38 - 73 %  38 - 73 % 71.7  71.6   Lymph % 18 - 48 %  18 - 48 % 16.6 (L)  16.3 (L)   Mono % 4 - 15 %  4 - 15 % 9.9  10.3   Eos % <=8 %  <=8 % 0.5  0.5   Basophil % <=1.9 %  <=1.9 % 0.9  0.9   Immature Granulocytes 0.0 - 0.5 %  0.0 - 0.5 % 0.4  0.4   Lymph # 1 - 4.8 K/uL  1 - 4.8 K/uL 0.94 (L)  0.93 (L)   Mono # 0.3 - 1 K/uL  0.3 - 1 K/uL 0.56  0.59   Eos # <=0.5 K/uL  <=0.5 K/uL 0.03  0.03   Baso # <=0.2 K/uL  <=0.2 K/uL 0.05  0.05   Immature Grans (Abs) 0.00 - 0.04 K/uL  0.00 - 0.04 K/uL 0.02  0.02   nRBC <=0 /100 WBC  <=0 /100 WBC 0  0   Iron 30 - 160 ug/dL 41   TIBC 250 - 450 ug/dL 269   Transferrin 200 - 375 mg/dL 182 (L)   Ferritin 20.0 - 300.0 ng/mL 150.0   Iron Saturation 20 - 50 % 15 (L)   Sed Rate <=20 mm/hr 52 (H)   (L): Data is abnormally low  (H): Data is abnormally high      EXAM:  XR HIP WITH PELVIS WHEN PERFORMED 2 OR 3 VIEWS RIGHT     CLINICAL HISTORY:  Right hip pain     FINDINGS:  3 views.  Previous fusion of the lumbar spine extending through the SI joints.     Prior ORIF of the proximal left femur.  No acute fractures or dislocations.  Right hip appears unremarkable.        Impression:   Unremarkable right hip.     Finalized on: 1/30/2025 4:48 AM By:  Mo Hitchcock MD  [facility]# [id number]      2025-01-30  04:50:27.972     [facility]     To my reading mild-moderate narrowing axial portion right hip         Narrative & Impression  EXAMINATION:  MRI HIP WITHOUT CONTRAST RIGHT     CLINICAL HISTORY:  Hip pain, chronic, impingement suspected, xray done;  Pain in right hip     TECHNIQUE:  Multiplanar, multisequence imaging of the pelvis and   hip without the use of contrast     COMPARISON:  05/15/2023 and 05/12/2023     FINDINGS:  Osseous Structures: No fracture, or avascular necrosis.No marrow signal abnormality to suggest bone marrow replacement process.     Hip and Sacroiliac joints: Small joint effusion.  No gross abnormalities of the acetabular paula are shown.  MR arthrogram would be necessary for complete evaluation of the paula, if clinically indicated.     Bursae: No trochanteric or iliopsoas bursitis.     Soft Tissues: Muscles and tendons of the pelvis and both hips show no atrophy, edema, mass, tears or other abnormalities.     Impression:     Small RIGHT hip effusion.  No evidence for occult osseous injury or muscular/tendinous injury.        Electronically signed by:Miguel Roman MD  Date:                                            05/29/2023  Time:                                           15:22        EXAMINATION:  MRI HIP WITHOUT CONTRAST RIGHT     CLINICAL HISTORY:  Hip trauma, fracture suspected, xray done;  Pain in right hip     TECHNIQUE:  MRI of the right hip, without intravenous contrast.     COMPARISON:  CT hip 04/13/2023     FINDINGS:  ENTIRE PELVIS: Advanced degenerative changes in the lower lumbar spine.  Postoperative changes from ORIF of the left proximal femur with a TFN system.  Susceptibility artifact degrades evaluation of adjacent structures.  No new fracture.  No sacroiliitis or marrow replacing lesion. Small subserosal fibroid at the uterine fundus.  No other visceral pelvic soft tissue abnormality.     RIGHT HIP:     BONE: No fracture, osteonecrosis, or marrow replacing lesion.     JOINT:  Mild degenerative changes with small regions of cartilage loss and small marginal osteophytes.  Small joint effusion/synovitis.  No loose bodies.     LABRUM: Nondisplaced anterosuperior labral tear (6:21 and 7:20).     TENDONS: The iliopsoas and rectus femoris tendons are intact.  Partial tear of the gluteus minimus and medius tendons at the greater trochanter attachment.  Small volume greater trochanteric bursal fluid.  Subtotal tear of the right proximal hamstring tendon at the ischial tuberosity attachment.     SOFT TISSUES: Fatty atrophy gluteus minimus and medius.  Narrowing of the ischiofemoral space, measuring 1.2 cm, with edema and partial tear of the quadratus femoris muscle (7:7).  The sciatic nerve is unremarkable.     Impression:     NO FRACTURE.     Mild right hip degenerative changes.     Anterosuperior labral tear.     Partial tears of the gluteus minimus and medius tendons, with greater trochanteric bursitis.     Subtotal tear of the proximal hamstring tendon.     Signs of ischiofemoral impingement.     Other findings as described.        Electronically signed by:Salo Aguilar  Date:                                            04/26/2023  Time:                                           08:57  EXAMINATION:  XR CERVICAL SPINE FLEXION  AND EXTENSION ONLY     CLINICAL HISTORY:  Rheumatoid arthritis, unspecified     TECHNIQUE:  XR CERVICAL SPINE FLEXION  AND EXTENSION ONLY     COMPARISON:  12/29/2022     FINDINGS:  Grade 1 anterolisthesis of C4-C5 and C5-C6.     Vertebral body heights are unchanged.     Disc spaces are unchanged.     Dental amalgam.     Impression:     As above.        Electronically signed by:Hernando Lucio  Date:                                            02/17/2025  Time:                                           11:37    MRI Face without  Contrast  Order: [id number]  Impression    Abnormal marrow signal in the anterior mandible, asymmetric to the right, is strictly indeterminate  representing some combination of osteonecrosis and osteomyelitis. Suspected cortical irregularity, adjacent soft tissue inflammatory change, and edema all favor a component of indolent osteomyelitis. Additional evaluation with CT of the face is warranted for improved osseous characterization and visibility of the cortex. No drainable fluid collections. No discrete fracture.      Javier Ivan M.D.  Neuroradiologist  [facility]          Note  Spoke with pt, states she is getting a lumbar spine MRI with her back surgeon and would like to wait on results prior to scheduling an appt with Dr. Small for her hip. Advised pt if MRI is unremarkable and spine surgeon does not feel pain is coming from her spine the next step would be to establish with Dr. Small for the hip. Pt states understanding and appreciation.      Lisa Christopher, MS, ATC, OTC  Clinical Assistant to Dr. Candida Small                ASSESSMENT:        Major issue at present is recurrent left lumbar radiculopathy and now intermittent right lumbar radiculopathy. Has been told will need repeat lumbar surgery after 6 wks of antibiotics for ONJ/osteomyelitis right lower molar/maxillary area     Other main issue is right anterior mandible  ONJ, bone biopsy 3/14/24 with cultures positive for oxacillin resistant Staph. Ep, Streptococcus ESBL E. Coli  Dr. Deirdre Bernstein ordered  6 wk course of daptomycin 8mg/kg IV daily and ertapenem 1g IV daily  via PICC line        Dr. Lane ordered another course of teriparatide in Jan 2024 but pt just recently started.        erosive RA discontinued long term methotrexate b/o nausea with po and sc and reduced dose  allergic to sulfa,   reaction to hydroxychloroquine;  Has failed 3 TNFi(infliximab, etanercept, adalimumab),  Abatacept ineffective   Rituximab: hypogammaglobulinemia and frequent infections in any event, acute vertigo/dizziness with tocilizumab x2  Failed tofacitinib  Currently on  Sarilumab, leflunomide     Given history of post op DVT and age best to avoid upadacitinib            RA  TJ 2  SJ 0  Pt global  75  ESR 2 CRP <0.3 DAS28 2.61(LDA) IJS15-CGU  2.87(LDA)  CDAI 10.5(MDA) driven by OA hips and knees post  lumbar surgery     Rheumatoid Arthritis Treatment Goals:  -Disease Activity Measure:CDAI   -Target: LDA  -Patient Goal:  -Therapy/Change: none, activity scores driven by back symptoms, neck symptoms, fibromyalgia , right shoulder, OA right knee, not RA.-Shared Decision Making: Discussed goals of care and therapy plan together with patient as outlined above.      right lower mandible  ONJ,  chronic osteomyelitis  bone biopsy 3/14/24 with cultures positive for oxacillin resistant Staph. Ep, Streptococcus ESBL E. Coli  Dr. Deirdre Bernstein completed  6 wk course of daptomycin 8mg/kg IV daily and ertapenem 1g IV daily  via PICC line then hyperbaric x 30days, about start another 30 day course. Currently on levofloxacin for URI  Dr. Bernstein has left, saw new ID but doesn't know his name  Follows with Dr. Scott Oral Surgeon  Secondary Sjogren's  Osteoporosis DXA 12/20/23 : normal BMD but  with FRAX : hip 1.8% major 25%  : completed 2 years of teriparatide added to denosumab, then  denosumab alone but ONJ and denosumab stopped(last dose 12/8/23) teriparatide resumed per Dr. Lane  ONJ while on denosumab  S/p L4-5 interbody fusion as above 6/22/23 Major issue currently is low back pain. No longer with radicular symptoms currently LBP without radicular pain  Had recent MRI lumbar spine and told of issue above previous fusion @ L1-2  Hips are fine at present, and normal by exam   Subclinical hyperthyroidism due for TSH  Thyroid nodule  Fibromyalgia;   OA knees  Right shoulder pain with rotator cuff tendinitis ?tear  OA with Right knee effusion  Lipids  LDL 79.2  4/8/24 on rosuvastatin 20mg nightly  Overweight  Body mass index is 27.2 kg/m².        PLAN:    Check with  Oral Surgery  whom she sees next week, and also her new Infectious Disease physician  regarding when she can resume sarilumab  CBC, CMP ESR CRP today   Ref to PT for low back, gait  *Covid vaccine declines  *Prevnar 20  *RSV vaccine  *Tdap   Continue leflunomide 20mg daily  Continue prednisone   6 mg daily  Continue teriparatide as resumed by Dr. Lane Jan 2024 in place of denosumab given denosumab related ONJ  Continue 1200mg dietary calcium daily   RTC 3 months with CBC, CMP ESR CRP

## 2025-06-18 NOTE — PATIENT INSTRUCTIONS
Check with  Oral Surgery whom she sees next week, and also her new Infectious Disease physician  regarding when she can resume sarilumab  CBC, CMP ESR CRP today   Ref to PT for low back, gait  *Covid vaccine declines  *Prevnar 20  *RSV vaccine  *Tdap   Continue leflunomide 20mg daily  Continue prednisone   6 mg daily  Continue teriparatide as resumed by Dr. Lane Jan 2024 in place of denosumab given denosumab related ONJ  Continue 1200mg dietary calcium daily   RTC 3 months with CBC, CMP ESR CRP

## 2025-06-18 NOTE — PROGRESS NOTES
Patient ID:  Joyce Peterson    YOB: 1960     MRN:  [id number]     Subjective:     Chief Complaint:  Disease Management     History of Present Illness:  Pt is a 64 y.o. female with RA; osteoporosis  who presents today for f/u. LCV was 01/30/2025. 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    Today: The patient is doing well from an RA standpoint. Has not had any significant flares. Does that she gets swollen at night in her ankles and wrists. The ONJ issue has been improving. She finished her course of IV ABX and had a session of 30 days of hyperbarics. She will start another 30 day session this Monday. She is currently on Levaquin for 10 days for bronchitis.  Her radiculopathy has improved and her low back pain is strictly axial currently. She trialed  a SCS and she did not receive adequate relief from it. She reports she might need a 3rd fusion in the lumbar spine at L1-2. She does not go to PT, OT because of her schedule with hyperbarics. She will see Dr. Gonsales after lumbar MRI. For exercise she does a lot of work around the house. She does her best to eat healthy. She did endorse dry eyes and dry mouth. Has not been taking her (Kevzara) given her antibiotics usage and will wait till ID clears her to start taking them again.    Denies hair loss, , vision changes,  oral/nasal ulcers, trouble swallowing, ,morning stiffness, or GI disturbances.      Review of Systems   Review of Systems   Constitutional:  Negative for fatigue and fever.   HENT:  Positive for congestion.    Respiratory:  Positive for cough and shortness of breath.    Cardiovascular:  Negative for chest pain and palpitations.   Gastrointestinal:  Negative for abdominal pain, constipation and diarrhea.   Endocrine: Negative.    Genitourinary:  Negative for difficulty urinating and dysuria.   Musculoskeletal:  Positive for back pain.   Neurological:  Negative for dizziness and  headaches.        Current Medications:  Current Medications[1]     Objective:     Vitals:    06/18/25 0946   BP: 123/73   Pulse: 80      Body mass index is 26.26 kg/m².     Physical Exam   Constitutional: She is oriented to person, place, and time. normal appearance.   HENT:   Head: Normocephalic and atraumatic.   Nose: Nose normal.   Mouth/Throat: Mucous membranes are moist. Oropharynx is clear.   Eyes: Pupils are equal, round, and reactive to light.   Cardiovascular: Normal rate, regular rhythm, normal heart sounds and normal pulses. Exam reveals no gallop.   No murmur heard.  Pulmonary/Chest: Effort normal. She has wheezes.   Abdominal: Soft. Bowel sounds are normal. She exhibits no distension. There is no abdominal tenderness.   Musculoskeletal:         General: Swelling present.      Right shoulder: Normal.      Left shoulder: Normal.      Right elbow: Normal.      Left elbow: Normal.      Right knee: Effusion present.   Neurological: She is alert and oriented to person, place, and time. She displays no weakness.       Right Side Rheumatological Exam     Examination finds the shoulder and elbow normal.    The patient is tender to palpation of the 1st CMC    The patient has an enlarged wrist, knee, 1st PIP, 1st MCP, 2nd PIP, 2nd MCP, 3rd PIP, 3rd MCP, 4th PIP, 4th MCP, 5th PIP and 5th MCP    Knee Exam   Effusion: positive    Hip Exam   Tenderness Location: no tenderness    Range of Motion   Abduction:  normal   Adduction:  normal   Extension:  normal   Flexion:  normal   External rotation:  normal   Internal rotation:  normal     Muscle Strength (0-5 scale):  Deltoid:  5  Biceps: 5/5   Triceps:  5  : 5/5   Iliopsoas: 5  Quadriceps:  5   Distal Lower Extremity: 5    Left Side Rheumatological Exam     Examination finds the shoulder and elbow normal.    The patient is tender to palpation of the 1st CMC.    The patient has an enlarged wrist, knee, 1st PIP, 1st MCP, 2nd PIP, 2nd MCP, 3rd PIP, 3rd MCP, 4th PIP,  4th MCP, 5th PIP and 5th MCP.    Hip Exam   Tenderness Location: no tenderness    Range of Motion   Abduction:  normal   Adduction:  normal   Extension:  normal   Flexion:  normal   External rotation:  normal   Internal rotation: normal     Muscle Strength (0-5 scale):  Deltoid:  5  Biceps: 5/5   Triceps:  5  :  5/5   Iliopsoas: 5  Quadriceps:  5   Distal Lower Extremity: 5             4/10/2024 7/15/2024 1/30/2025 6/18/2025   Tender (GURROLA-28) 0 / 28  0 / 28 2 / 28  0 / 28    Swollen (GURROLA-28) 0 / 28 2 / 28  0 / 28 1 / 28    Provider Global 5 / 100 10 / 100 10 / 100 --   Patient Global -- 85 / 100 75 / 100 --   ESR 2 mm/hr 2 mm/hr 3 mm/hr --   CRP -- 0.2 mg/L 0.2 mg/L --   GURROLA-28 (ESR) -- 2.07 (Remission) 2.61 (Low disease activity) --   GURROLA-28 (CRP) -- 2.61 (Low disease activity) 2.87 (Low disease activity) --   CDAI Score -- 11.5  10.5  --              Assessment:     No diagnosis found.    RA has been in remission  Major issue at present is recurrent left lumbar radiculopathy and now intermittent right lumbar radiculopathy. {Fortunato is more axial now. Has been told will need repeat lumbar surgery.   Other main issue is right lower molar ONJ, bone biopsy 3/14/24 with cultures positive for oxacillin resistant Staph. Ep, Streptococcus ESBL E. Coli  Dr. Deirdre Bernstein plans 6 wk course of daptomycin 8mg/kg IV daily and ertapenem 1g IV daily  via PICC line  - Will start 2nd 30 day session of hyperbarics on Monday   - ONJ has been improving        Dr. Lane ordered another course of teriparatide in Jan but pt just recently started.        erosive RA discontinued long term methotrexate b/o nausea with po and sc and reduced dose  , allergic to sulfa,   and had reaction to hydroxychloroquine;  Has failed 3 TNFi(infliximab, etanercept, adalimumab),   Abatacept ineffective   Rituximab: hypogammaglobulinemia and frequent infections in any event, acute vertigo/dizziness with tocilizumab x2  Failed  tofacitinib  Currently on Sarilumab, leflunomide     Given history of post op DVT and age best to avoid upadacitinib            RA  TJ 2  SJ 0  Pt global  75  ESR 2 CRP <0.3 DAS28 2.61(LDA) [id number]-FAV  2.87(LDA)  CDAI 10.5(MDA) driven by OA hips and knees post  lumbar surgery     Rheumatoid Arthritis Treatment Goals:  -Disease Activity Measure:CDAI   -Target: LDA  -Patient Goal:  -Therapy/Change: none, activity scores driven by back symptoms, neck symptoms, fibromyalgia , right shoulder, OA right knee, not RA.-Shared Decision Making: Discussed goals of care and therapy plan together with patient as outlined above.         Secondary Sjogren's  Osteoporosis DXA 12/20/23 : normal BMD but  with FRAX : hip 1.8% major 25%  : completed 2 years of teriparatide added to denosumab, then  denosumab alone but ONJ and denosumab stopped(last dose 12/8/23) teriparatide resumed per Dr. Lane  ON while on denosumab  S/p L4-5 interbody fusion as above 6/22/23 with recurrent left lumbar and now right lumbar radiculopathy, with planned revision surgery   Subclinical hyperthyroidism due for TSH  Thyroid nodule  Fibromyalgia;   OA knees  Right hip pain with small effusion, mild degenerative changes, anterior-superior labral tear, partial tears gluteus minimus and medius tendons, greater trochanteric bursiti, subtotal tear proximal hamstring on MRI 4/25/23  Right shoulder pain with rotator cuff tendinitis ?tear  OA with Right knee effusion  Lipids  LDL 79.2  4/8/24 on rosuvastatin 20mg nightly  Overweight  Body mass index is 27.2 kg/m².  Plan:      Problem List Items Addressed This Visit    None     Check with  Oral Surgery whom she sees next week, and also her new Infectious Disease physician  regarding when she can resume sarilumab  CBC, CMP ESR CRP today   Ref to PT for low back, gait  *Covid vaccine declines  *Prevnar 20  *RSV vaccine  *Tdap   Continue leflunomide 20mg daily  Continue prednisone   6 mg daily  Continue  "teriparatide as resumed by Dr. Lane Jan 2024 in place of denosumab given denosumab related ONJ  Continue 1200mg dietary calcium daily   RTC 3 months with CBC, CMP ESR CRP     Jacquelin Munoz, DO  PGY-1  [employer] [facility]          [1]   Current Outpatient Medications:     albuterol (PROVENTIL/VENTOLIN HFA) 90 mcg/actuation inhaler, Inhale 2 puffs into the lungs every 6 (six) hours as needed for Wheezing. Rescue, Disp: 20.1 g, Rfl: 11    albuterol-ipratropium (DUO-NEB) 2.5 mg-0.5 mg/3 mL nebulizer solution, Take 3 mLs by nebulization every 6 (six) hours as needed for Wheezing. Rescue, Disp: 90 mL, Rfl: 3    azelastine (ASTELIN) 137 mcg (0.1 %) nasal spray, Use 1 spray (137 mcg total) by Nasal route 2 (two) times daily., Disp: 30 mL, Rfl: 11    BD ULTRA-FINE JAIME PEN NEEDLE 32 gauge x 5/32" Ndle, For Forteo - inject daily, Disp: 100 each, Rfl: 3    budesonide-formoterol 160-4.5 mcg (SYMBICORT) 160-4.5 mcg/actuation HFAA, Inhale 2 puffs into the lungs every 12 (twelve) hours., Disp: 30.6 g, Rfl: 3    calcium citrate-vitamin D3 315-200 mg (CITRACAL+D) 315 mg-5 mcg (200 unit) per tablet, Take 1 tablet by mouth 2 (two) times daily. , Disp: , Rfl:     cefdinir (OMNICEF) 300 MG capsule, Take 1 capsule by mouth every 12 (twelve) hours, Disp: 60 capsule, Rfl: 1    chlorhexidine (PERIDEX) 0.12 % solution, SWISH AND SPIT 15MG TWICE DAILY, DO NOT EAT OR DRINK FOR 30 MINUTES AFTER USE, Disp: 473 mL, Rfl: 0    ciprofloxacin-dexAMETHasone 0.3-0.1% (CIPRODEX) 0.3-0.1 % DrpS, Place 4 drops into both ears 2 (two) times daily. for 10 days, Disp: 7.5 mL, Rfl: 0    cycloSPORINE (RESTASIS) 0.05 % ophthalmic emulsion, Place 1 drop into both eyes 2 (two) times daily., Disp: 180 each, Rfl: 3    diclofenac sodium (VOLTAREN) 1 % Gel, APPLY 2 GRAMS TOPICALLY 4 (FOUR) TIMES DAILY AS NEEDED., Disp: 300 g, Rfl: 2    fluconazole (DIFLUCAN) 100 MG tablet, Take 1 tablet (100 mg total) by mouth once daily., Disp: 14 tablet, Rfl: 3    fluconazole " (DIFLUCAN) 150 MG Tab, Take 150 mg by mouth as needed., Disp: , Rfl:     INV cisapride 1 mg/mL suspension, Take 20 mg (20 milliliters) by mouth 4 (four) times daily as directed. Shake well before use. FOR INVESTIGATIONAL USE ONLY.  Protocol CIS-USA-154; Participant ID: [id number]., Disp: 6000 mL, Rfl: 0    INV PROPULSID 10 MG, Take 2 tablets (20 mg) by mouth 4 (four) times daily. FOR INVESTIGATIONAL USE ONLY. Protocol CIS-USA-154 Subject ID: [id number]., Disp: 1440 each, Rfl: 1    leflunomide (ARAVA) 20 MG Tab, Take 1 tablet (20 mg total) by mouth once daily., Disp: 90 tablet, Rfl: 0    levoFLOXacin (LEVAQUIN) 500 MG tablet, Take 1 tablet (500 mg total) by mouth once daily. for 14 days, Disp: 14 tablet, Rfl: 0    lifitegrast (XIIDRA) 5 % Dpet, INSTILL ONE DROP INTO BOTH EYES TWICE A DAY, Disp: 60 mL, Rfl: 10    magic mouthwash diphen/antac/lidoc, rinse mouth with 5 ml for 30 seconds and spit out, twice daily, Disp: 150 mL, Rfl: 2    methenamine (HIPREX) 1 gram Tab, Take 1 tablet (1 g total) by mouth 2 (two) times daily., Disp: 60 tablet, Rfl: 11    mirabegron (MYRBETRIQ) 25 mg Tb24 ER tablet, Take 1 tablet (25 mg total) by mouth once daily., Disp: 30 tablet, Rfl: 11    montelukast (SINGULAIR) 10 mg tablet, Take 1 tablet (10 mg total) by mouth every evening., Disp: 90 tablet, Rfl: 3    morphine (MS CONTIN) 15 MG 12 hr tablet, Take 1 tablet (15 mg total) by mouth 2 (two) times daily., Disp: 60 tablet, Rfl: 0    [START ON 6/30/2025] morphine (MS CONTIN) 15 MG 12 hr tablet, Take 1 tablet (15 mg total) by mouth 3 (three) times daily., Disp: 90 tablet, Rfl: 0    naloxone (NARCAN) 4 mg/actuation Spry, 4mg by nasal route as needed for opioid overdose; may repeat every 2-3 minutes in alternating nostrils until medical help arrives. Call 911, Disp: 2 each, Rfl: 11    naproxen (NAPROSYN) 500 MG tablet, TAKE 1 TABLET BY MOUTH TWICE A DAY AS NEEDED, Disp: 180 tablet, Rfl: 0    omeprazole-sodium bicarbonate (ZEGERID) 40-1.1 mg-gram per  capsule, TAKE 1 CAPSULE BY MOUTH EVERY DAY IN THE MORNING, Disp: 90 capsule, Rfl: 3    oseltamivir (TAMIFLU) 75 MG capsule, Take 1 capsule (75 mg total) by mouth 2 (two) times daily. for 10 days, Disp: 20 capsule, Rfl: 0    pantoprazole (PROTONIX) 40 MG tablet, Take 1 tablet (40 mg total) by mouth 2 (two) times daily., Disp: 180 tablet, Rfl: 3    pantoprazole (PROTONIX) 40 MG tablet, Take 1 tablet (40 mg total) by mouth 2 (two) times daily., Disp: 60 tablet, Rfl: 3    pentoxifylline (TRENTAL) 400 mg TbSR, Take 1 tablet (400 mg total) by mouth 2 (two) times a day., Disp: 180 tablet, Rfl: 0    pentoxifylline (TRENTAL) 400 mg TbSR, Take 1 tablet by mouth 2 (two) times a day, Disp: 60 tablet, Rfl: 0    pentoxifylline (TRENTAL) 400 mg TbSR, Take 1 tablet by mouth 2 (two) times daily with meals, Disp: 120 tablet, Rfl: 0    potassium chloride SA (K-DUR,KLOR-CON) 20 MEQ tablet, Take 1 tablet (20 mEq total) by mouth 2 (two) times daily., Disp: 180 tablet, Rfl: 3    predniSONE (DELTASONE) 1 MG tablet, Take 1 tablet (1 mg total) by mouth once daily., Disp: 90 tablet, Rfl: 1    predniSONE (DELTASONE) 10 MG tablet, Take 4 tabs by mouth on day 1, then 3 tabs on day 2, then 2 tabs on day 3, then 1 tab on day 4, Disp: 10 tablet, Rfl: 0    predniSONE (DELTASONE) 5 MG tablet, Take 1 tablet (5 mg total) by mouth once daily., Disp: 90 tablet, Rfl: 1    pregabalin (LYRICA) 150 MG capsule, Take 1 capsule (150 mg total) by mouth 3 (three) times daily., Disp: 270 capsule, Rfl: 3    promethazine (PHENERGAN) 25 MG tablet, Take 1 tablet (25 mg total) by mouth every 6 (six) hours as needed for Nausea., Disp: 90 tablet, Rfl: 5    rosuvastatin (CRESTOR) 20 MG tablet, TAKE 1 TABLET BY MOUTH EVERY DAY IN THE EVENING, Disp: 90 tablet, Rfl: 3    rosuvastatin (CRESTOR) 20 MG tablet, Take 1 tablet by mouth every day in the evening, Disp: 90 tablet, Rfl: 3    sarilumab (KEVZARA) 200 mg/1.14 mL Syrg, Inject 1.14 mL (200 mg total) into the skin every 14  (fourteen) days., Disp: 2.28 mL, Rfl: 2    senna-docusate (SENNA PLUS) 8.6-50 mg per tablet, Take 1 tablet by mouth daily, Disp: 30 tablet, Rfl: 1    STIMULANT LAXATIVE PLUS 8.6-50 mg per tablet, Take 1 tablet by mouth., Disp: , Rfl:     sucralfate (CARAFATE) 1 gram tablet, TAKE 1 TABLET BY MOUTH FOUR TIMES A DAY, Disp: 360 tablet, Rfl: 2    teriparatide (FORTEO) 20 mcg/dose (560mcg/2.24mL) PnIj, Inject 0.08 mLs (20 mcg total) into the skin once daily., Disp: 2.4 mL, Rfl: 11    tiZANidine (ZANAFLEX) 4 MG tablet, Take 1 tablet (4 mg total) by mouth every 6 (six) hours as needed (for muscle spasms)., Disp: 120 tablet, Rfl: 1    triamcinolone (NASACORT) 55 mcg nasal inhaler, 2 sprays in each nostril once a day, Disp: 16.9 mL, Rfl: 5    vitamin E 1000 UNIT capsule, Take 1,000 Units by mouth., Disp: , Rfl:

## 2025-06-19 ENCOUNTER — PATIENT MESSAGE (OUTPATIENT)
Dept: HEMATOLOGY/ONCOLOGY | Facility: CLINIC | Age: 65
End: 2025-06-19
Payer: MEDICARE

## 2025-06-19 ENCOUNTER — PATIENT MESSAGE (OUTPATIENT)
Dept: OTOLARYNGOLOGY | Facility: CLINIC | Age: 65
End: 2025-06-19
Payer: MEDICARE

## 2025-06-19 DIAGNOSIS — D50.9 IRON DEFICIENCY ANEMIA, UNSPECIFIED IRON DEFICIENCY ANEMIA TYPE: Primary | ICD-10-CM

## 2025-06-20 LAB — BACTERIA SPEC ANAEROBE CULT: NORMAL

## 2025-06-21 LAB — BACTERIA SPEC AEROBE CULT: NO GROWTH

## 2025-06-23 ENCOUNTER — PATIENT MESSAGE (OUTPATIENT)
Dept: PAIN MEDICINE | Facility: CLINIC | Age: 65
End: 2025-06-23
Payer: MEDICARE

## 2025-06-23 ENCOUNTER — RESULTS FOLLOW-UP (OUTPATIENT)
Dept: OTOLARYNGOLOGY | Facility: CLINIC | Age: 65
End: 2025-06-23

## 2025-06-23 DIAGNOSIS — G89.4 CHRONIC PAIN DISORDER: ICD-10-CM

## 2025-06-23 RX ORDER — MORPHINE SULFATE 15 MG/1
15 TABLET, FILM COATED, EXTENDED RELEASE ORAL 2 TIMES DAILY
Qty: 60 TABLET | Refills: 0 | Status: SHIPPED | OUTPATIENT
Start: 2025-06-24 | End: 2025-06-24

## 2025-06-23 NOTE — TELEPHONE ENCOUNTER
Patient requesting refill on Morphine 15 mg   Last office visit 06/04/2025   shows last refill on 06/10/2025  Patient does have a pain contract on file with [facility] department  Patient last UDS 03/11/2025

## 2025-06-24 ENCOUNTER — TELEPHONE (OUTPATIENT)
Dept: PAIN MEDICINE | Facility: CLINIC | Age: 65
End: 2025-06-24
Payer: MEDICARE

## 2025-06-24 DIAGNOSIS — G89.4 CHRONIC PAIN DISORDER: Primary | ICD-10-CM

## 2025-06-24 DIAGNOSIS — M96.1 POSTLAMINECTOMY SYNDROME OF LUMBAR REGION: ICD-10-CM

## 2025-06-24 RX ORDER — MORPHINE SULFATE 15 MG/1
15 TABLET, FILM COATED, EXTENDED RELEASE ORAL 3 TIMES DAILY
Qty: 90 TABLET | Refills: 0 | Status: CANCELLED | OUTPATIENT
Start: 2025-06-24 | End: 2025-07-24

## 2025-06-24 RX ORDER — MORPHINE SULFATE 15 MG/1
15 TABLET, FILM COATED, EXTENDED RELEASE ORAL 3 TIMES DAILY
Qty: 90 TABLET | Refills: 0 | Status: SHIPPED | OUTPATIENT
Start: 2025-06-24 | End: 2025-07-24

## 2025-06-24 RX ORDER — MORPHINE SULFATE 15 MG/1
15 TABLET, FILM COATED, EXTENDED RELEASE ORAL 3 TIMES DAILY
Qty: 90 TABLET | Refills: 0 | Status: SHIPPED | OUTPATIENT
Start: 2025-06-24 | End: 2025-06-24

## 2025-06-24 NOTE — TELEPHONE ENCOUNTER
"Spoke with [facility] team member. Verified that this prescription has NEVER been written correctly since Dr. Mayfield made change in his notes on 04/09/25. Patient picked up morphine (MS CONTIN) 15 MG 12 hr tablet twice daily since last in-person visit on 04/09/25. Last pick-up on 06/10/25. Pharmacy team member states that we can re-write for the corrected frequency of 3 TIMES A DAY AND NO FUTURE FILL DATE (previously written as 06/30/25) and this medication can be filled TODAY, 06/24/25 prior to patient leaving on a trip. The new frequency will change the fill date & be accepted by insurance company. Call ended.     Corrected prescription pended to Dr. Gayle.         Tran's note from 04/09/26:    "  Plan:   We discussed with the patient the assessment and recommendations. The following is the plan we agreed on:     Images reviewed and discussed with patient.   Increase MS Contin to 15 mg Q12 hour to Q8 hours since patient is not having sufficient pain relief with Q12 regimen. She was previously on 30 mg Q12.   Continue to follow up with Neurosurgery, ID and Rheumatology.  Continue with HEP.   Schedule B/L TFESI L3/4.   UDS on 3/11/2025 and appears to be appropriate.    reviewed and appears to be appropriate.   Order for SCS trial after Hyperbarics (Due to complete Hyperbarics in the middle of June). [place] [facility]. To be schedule after patient completes her Hyperbaric treatment. Ordered Psych clearance for SCS.   Follow up 2 weeks after procedure.   "  "

## 2025-07-03 ENCOUNTER — RESULTS FOLLOW-UP (OUTPATIENT)
Dept: RHEUMATOLOGY | Facility: HOSPITAL | Age: 65
End: 2025-07-03

## 2025-07-03 RX ORDER — AMOXICILLIN 250 MG
1 CAPSULE ORAL
Qty: 30 TABLET | Refills: 1 | Status: SHIPPED | OUTPATIENT
Start: 2025-07-03

## 2025-07-03 NOTE — TELEPHONE ENCOUNTER
No care due was identified.  [facility] Embedded Care Due Messages. Reference number: [id number].   7/03/2025 10:17:56 AM CDT

## 2025-07-06 ENCOUNTER — PATIENT MESSAGE (OUTPATIENT)
Dept: OTOLARYNGOLOGY | Facility: CLINIC | Age: 65
End: 2025-07-06
Payer: MEDICARE

## 2025-07-07 ENCOUNTER — TELEPHONE (OUTPATIENT)
Dept: HEPATOLOGY | Facility: CLINIC | Age: 65
End: 2025-07-07
Payer: MEDICARE

## 2025-07-07 ENCOUNTER — PATIENT MESSAGE (OUTPATIENT)
Dept: HEPATOLOGY | Facility: CLINIC | Age: 65
End: 2025-07-07
Payer: MEDICARE

## 2025-07-07 DIAGNOSIS — M35.00 SJOGREN'S SYNDROME, WITH UNSPECIFIED ORGAN INVOLVEMENT: ICD-10-CM

## 2025-07-07 DIAGNOSIS — M06.9 RHEUMATOID ARTHRITIS INVOLVING MULTIPLE SITES, UNSPECIFIED WHETHER RHEUMATOID FACTOR PRESENT: Chronic | ICD-10-CM

## 2025-07-07 DIAGNOSIS — Z92.21 HISTORY OF METHOTREXATE THERAPY: ICD-10-CM

## 2025-07-07 DIAGNOSIS — E78.00 PURE HYPERCHOLESTEROLEMIA: ICD-10-CM

## 2025-07-07 DIAGNOSIS — R74.8 ELEVATED SERUM GGT LEVEL: Primary | ICD-10-CM

## 2025-07-07 DIAGNOSIS — K76.0 NAFLD (NONALCOHOLIC FATTY LIVER DISEASE): ICD-10-CM

## 2025-07-07 NOTE — TELEPHONE ENCOUNTER
Spoke with patient. US, fibroscan and follow up visit with Candida has been scheduled. Mailed appointment reminder to patient.

## 2025-07-07 NOTE — TELEPHONE ENCOUNTER
----- Message from Candida Walls NP sent at 7/7/2025  9:31 AM CDT -----  Please arrange next available f/u visit with me with US and Fibroscan prior to the visit. Thanks!  ----- Message -----  From: Isiah Moran MD  Sent: 7/3/2025   7:25 PM CDT  To: Candida Walls NP    Your GGT liver test remains mildly elevated as it has been for at least 4 years. Continue f/u in Hepatology with Candida Walls RJQ  ----- Message -----  From: Lab, Background User  Sent: 7/3/2025   2:11 PM CDT  To: Isiah Moran MD

## 2025-07-07 NOTE — TELEPHONE ENCOUNTER
Called pt. Appointment rescheduled to July 24th at 1PM with audiology and 1:40PM with Dr. Whaley. Pt thanked me and verbalized understanding.

## 2025-07-08 DIAGNOSIS — J30.89 PERENNIAL ALLERGIC RHINITIS: ICD-10-CM

## 2025-07-08 RX ORDER — MONTELUKAST SODIUM 10 MG/1
10 TABLET ORAL NIGHTLY
Qty: 90 TABLET | Refills: 1 | Status: SHIPPED | OUTPATIENT
Start: 2025-07-08

## 2025-07-08 NOTE — TELEPHONE ENCOUNTER
No care due was identified.  [facility] Embedded Care Due Messages. Reference number: [id number].   7/08/2025 12:28:58 PM CDT

## 2025-07-08 NOTE — TELEPHONE ENCOUNTER
No care due was identified.  [facility] Embedded Care Due Messages. Reference number: [id number].   7/08/2025 12:26:11 PM CDT

## 2025-07-09 RX ORDER — PENTOXIFYLLINE 400 MG/1
400 TABLET, EXTENDED RELEASE ORAL 2 TIMES DAILY WITH MEALS
Qty: 120 TABLET | Refills: 0 | OUTPATIENT
Start: 2025-07-09

## 2025-07-09 NOTE — TELEPHONE ENCOUNTER
Refill Authorization Note     Refill Decision Note   Joyce Kristen  is requesting a refill authorization.  Brief Assessment and Rationale for Refill:  Approve     Medication Therapy Plan:       Medication Reconciliation Completed: No   Comments:     No Care Gaps recommended.     Note composed:8:55 PM 07/08/2025

## 2025-07-09 NOTE — TELEPHONE ENCOUNTER
Called and spoke with pt in regards of medication not being prescribed by Dr. Singleton, pt verbalized understanding of message. Pt informed me that she call the correct provider for her med refill.

## 2025-07-16 ENCOUNTER — PATIENT MESSAGE (OUTPATIENT)
Dept: RHEUMATOLOGY | Facility: CLINIC | Age: 65
End: 2025-07-16
Payer: MEDICARE

## 2025-07-16 ENCOUNTER — PATIENT MESSAGE (OUTPATIENT)
Dept: PAIN MEDICINE | Facility: CLINIC | Age: 65
End: 2025-07-16
Payer: MEDICARE

## 2025-07-16 ENCOUNTER — PATIENT MESSAGE (OUTPATIENT)
Dept: HEMATOLOGY/ONCOLOGY | Facility: CLINIC | Age: 65
End: 2025-07-16
Payer: MEDICARE

## 2025-07-16 DIAGNOSIS — M96.1 POSTLAMINECTOMY SYNDROME OF LUMBAR REGION: ICD-10-CM

## 2025-07-16 RX ORDER — MORPHINE SULFATE 15 MG/1
15 TABLET, FILM COATED, EXTENDED RELEASE ORAL 3 TIMES DAILY
Qty: 90 TABLET | Refills: 0 | Status: SHIPPED | OUTPATIENT
Start: 2025-07-24 | End: 2025-08-23

## 2025-07-22 DIAGNOSIS — M19.90 OSTEOARTHRITIS, UNSPECIFIED OSTEOARTHRITIS TYPE, UNSPECIFIED SITE: ICD-10-CM

## 2025-07-22 RX ORDER — DICLOFENAC SODIUM 10 MG/G
GEL TOPICAL
Qty: 300 G | Refills: 2 | Status: SHIPPED | OUTPATIENT
Start: 2025-07-22

## 2025-07-24 ENCOUNTER — CLINICAL SUPPORT (OUTPATIENT)
Dept: OTOLARYNGOLOGY | Facility: CLINIC | Age: 65
End: 2025-07-24
Payer: MEDICARE

## 2025-07-24 ENCOUNTER — OFFICE VISIT (OUTPATIENT)
Dept: OTOLARYNGOLOGY | Facility: CLINIC | Age: 65
End: 2025-07-24
Payer: MEDICARE

## 2025-07-24 VITALS
DIASTOLIC BLOOD PRESSURE: 63 MMHG | SYSTOLIC BLOOD PRESSURE: 115 MMHG | BODY MASS INDEX: 27.93 KG/M2 | HEART RATE: 94 BPM | WEIGHT: 147.81 LBS

## 2025-07-24 DIAGNOSIS — H90.3 SENSORINEURAL HEARING LOSS, BILATERAL: ICD-10-CM

## 2025-07-24 DIAGNOSIS — H69.93 EUSTACHIAN TUBE DYSFUNCTION, BILATERAL: Primary | ICD-10-CM

## 2025-07-24 DIAGNOSIS — Z96.22 S/P TYMPANOSTOMY TUBE PLACEMENT: Primary | ICD-10-CM

## 2025-07-24 PROCEDURE — 3074F SYST BP LT 130 MM HG: CPT | Mod: CPTII,S$GLB,, | Performed by: OTOLARYNGOLOGY

## 2025-07-24 PROCEDURE — 99999 PR PBB SHADOW E&M-EST. PATIENT-LVL IV: CPT | Mod: PBBFAC,,, | Performed by: OTOLARYNGOLOGY

## 2025-07-24 PROCEDURE — 99214 OFFICE O/P EST MOD 30 MIN: CPT | Mod: S$GLB,,, | Performed by: OTOLARYNGOLOGY

## 2025-07-24 PROCEDURE — 1160F RVW MEDS BY RX/DR IN RCRD: CPT | Mod: CPTII,S$GLB,, | Performed by: OTOLARYNGOLOGY

## 2025-07-24 PROCEDURE — 1159F MED LIST DOCD IN RCRD: CPT | Mod: CPTII,S$GLB,, | Performed by: OTOLARYNGOLOGY

## 2025-07-24 PROCEDURE — 3078F DIAST BP <80 MM HG: CPT | Mod: CPTII,S$GLB,, | Performed by: OTOLARYNGOLOGY

## 2025-07-24 PROCEDURE — 3008F BODY MASS INDEX DOCD: CPT | Mod: CPTII,S$GLB,, | Performed by: OTOLARYNGOLOGY

## 2025-07-24 RX ORDER — OFLOXACIN 3 MG/ML
3 SOLUTION AURICULAR (OTIC) 2 TIMES DAILY
Qty: 10 ML | Refills: 0 | Status: SHIPPED | OUTPATIENT
Start: 2025-07-24 | End: 2025-08-18

## 2025-07-24 NOTE — PROGRESS NOTES
Chief Complaint   Patient presents with    Ear Problem     Both ears feel hearing loss and right ear have a blocked sound        HPI:  Patient is a 64 y.o. female who has previously seen me for chronic rhinitis, chronic otitis externa, right ear canal lesion, previous sialoadenitis.  At last visit she was treated with Ciprodex otic drops for right ear canal inflammation, and mupirocin for left nasal vestibule inflammation and small lesion.  She presents today for follow-up to re-examine these areas and assess response to therapy.    Since the last visit, the patient reports the nasal crusting and irritation has improved though does occasionally flare up.  She used Ciprodex drops on the right ear for short time, but they were causing pain with use so she stopped.  She has not noticed any otorrhea or other ongoing issues with the right ear, though she occasionally has mild discomfort.  She does complain of purulent nasal secretions and thick postnasal drip over the last few weeks.  She has some sinus pressure and ear pressure as well.  She does report a history of allergic rhinitis symptoms in the past and has had some occasional sinusitis when allergies flare.      Interval HPI 11/26/2024 :      Patient reports she has not had any significant otorrhea from the right.  She still continues to use Q-tips in the ears and does notice some scant blood in the ear when she does this.  Previous cultures from last visit did not grow any bacteria or fungus.    She also reports mild increase in her rhinitis symptoms over the last few days.  She uses flonase and azelastine occasionally, and she uses zyrtec, but antihistamines cause significant drying of her eyes.    Interval HPI 02/13/2025 :      Patient was using [employer] eardrops and felt the issue was improving so she had stopped use recently.  She does not report any changes or worsening of the right ear symptoms.  She has had an increase in her allergy/rhinitis  "symptoms over the last day.        Component 2 mo ago   Aerobic Bacterial Culture  Abnormal   STAPHYLOCOCCUS AUREUS  Many    Resulting Agency [facility]        Susceptibility     Staphylococcus aureus     CULTURE, AEROBIC  (SPECIFY SOURCE)     Clindamycin <=0.5 mcg/mL Resistant     Erythromycin >4 mcg/mL Resistant     Oxacillin <=0.25 mcg/mL Sensitive     Tetracycline <=4 mcg/mL Sensitive     Trimeth/Sulfa <=0.5/9.5 m... Sensitive                   Interval HPI 03/20/2025 :      Patient started hyperbaric O2 treatments and noted to have possible serous effusion after first "dive".  She was able to depressurize her left ear but not her right during her descent at her HBO treatment.  Afterwards, the nurse noted erythema and effusion in the right ME with trace bloody effusion.   She feels it has improved since a few days ago, but she still cannot get the ear to pop.   She has not been using nasacort due to eye side effects.    Interval HPI 03/24/2025 :    Took steroids, no significant improvement in symptoms.  Here for PET placement for HBO.  Right ear still feels clogged.  No pain.    Interval HPI 04/14/2025 :    Patient had PET placed in right ear at last visit.   Patient complains that she feels her hearing is off in the right ear since her 1st hyperbaric treatment.  She used eardrops after placement if her PE tube at last visit, but has not continued to use these past the 1 week harsha.  She does not report any otorrhea or ear pain.  She has continued hyperbaric oxygen treatments and is able to easily Depressurize the left ear.    Interval HPI 05/12/2025 :      Patient had been doing well with hyperbaric oxygen, but approximately 1-2 weeks ago she began having upper respiratory infection symptoms.  Nasal congestion, postnasal drip, cough.  Symptoms progressed to bronchitis and she was seen by urgent care who placed her on azithromycin.  She has been using her nebulizer treatments and does feel slightly improved.  During " that time, she attempted hyperbaric oxygen treatments, but had acute pain in the left ear and ear and down into the left upper neck.  She stopped HBO, and they wanted further evaluation of the ear prior to restarting.  She does report some scant bloody otorrhea from the left, muffled hearing currently.    Interval HPI 05/26/2025 :      Ear is better today, still feels muffled and hearing stiff feels decreased on left side.     She is supposed to resume HBO later this week.     Interval HPI 06/17/2025 :      CHIEF COMPLAINT:  - Patient presents with ongoing right-sided ear drainage and hearing issues in the right ear, as well as recurrent sinus problems and respiratory infections.    HPI:  Patient reports ongoing issues with her right ear, including persistent drainage and a sensation of blockage. The blockage temporarily clears when leaning down but returns upon standing. The drainage requires sleeping with a towel on her pillow, and she observes crusting on the outside of the ear in the morning.    She also reports ongoing sinus problems, with persistent green nasal discharge from the left side after antibiotic treatment. She has been taking Cefdinir and using ear drops twice daily for the past week. She developed bronchitis symptoms about a week ago.  Her  had COVID and the flu and she got sick while he was also sick.    Patient describes facial pain, which was particularly severe the previous night. She denies fever but reports feeling unwell, attributing it to environmental factors. She has a history of recurrent respiratory infections and bronchitis, with no occurrences since 2022.    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 She is on an immunosuppressant drug, which may contribute to her susceptibility to infections.     Regarding treatments, she has been using Ciprodex or ofloxacin ear drops  in both ears. She takes half a Benadryl nightly and uses Mucinex for her sinus problems. In the past, she has used nasal sprays such as Nasacort and Astelin, which she found beneficial.          Interval HPI 07/24/2025 :    CHIEF COMPLAINT:  - Patient presents for follow-up regarding ear tubes and hearing issues, particularly concerning the right ear.    HPI:  Patient reports ongoing hearing difficulties, particularly in the right ear, following ear tube insertion. She describes a sensation of constant fluid in the ear, with symptoms worsening after showering.The left ear tube is functioning well, but the right ear continues to cause problems. Her hearing in the right ear has significantly deteriorated since the tube insertion. Patient reports increased hearing impairment when using ear drops and occasional ear pain, for which she uses the prescribed ear drops. She is currently undergoing hyperbaric oxygen treatments, with 30 more sessions remaining, but she is unsure when she will restart therapy.She continues to use nasal sprays as part of her treatment regimen.    Patient denies current drainage or infection in the ears.           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    Thyroid nodule 08/22/2013    Hyperlipidemia 08/22/2013    Mild intermittent asthma without complication 11/04/2015    Rheumatoid arthritis involving multiple sites 11/16/2016    Iron deficiency anemia due to chronic blood loss 11/14/2017    Sjogren's syndrome 05/21/2018    Candidal esophagitis 10/15/2018    Coronary artery disease involving native coronary artery of native heart without angina pectoris 07/16/2019    Subclinical hyperthyroidism 02/21/2020    Dry eyes     Ureterocele     Spinal stenosis, lumbar region without neurogenic claudication 07/23/2021    History of methotrexate therapy 01/19/2022     NAFLD (nonalcoholic fatty liver disease) 12/29/2022    Chronic pain 12/29/2022    Hypokalemia 06/24/2023    Arthrodesis status 06/29/2023    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 01/11/2024    Nausea 04/15/2024    Acute deep vein thrombosis (DVT) of right peroneal vein 07/22/2024     Resolved Ambulatory Problems     Diagnosis Date Noted    Osteoarthritis involving multiple joints on both sides of body 08/01/2012    Coronary artery disease 02/06/2013    Posterior tibial tendinitis of left leg 03/08/2013    Lumbar spondylosis 03/08/2013    CS (cervical spondylosis) 03/08/2013    Osteoarthritis of both knees 03/08/2013    Occipital neuralgia 08/13/2014    Osteoarthritis of CMC joint of thumb 11/13/2014    URTI (acute upper respiratory infection) 07/28/2015    Umbilical hernia 08/13/2015    CMC (carpometacarpal) synovitis 10/19/2015    AR (allergic rhinitis) 11/04/2015    Iron deficiency anemia 11/11/2015    Vomiting 11/23/2015    Viral gastroenteritis 11/23/2015    Sepsis 11/23/2015    SIRS due to infectious process with organ dysfunction 11/23/2015    Intractable vomiting with nausea 11/23/2015    Volume depletion, gastrointestinal loss 11/23/2015    Immunosuppressed status 03/03/2016    Lateral meniscus derangement 04/06/2016    Lateral epicondylitis of right elbow 08/10/2016    Pleurisy 11/01/2016    Thoracic back pain 02/07/2017    Low back pain 02/07/2017    Carpal tunnel syndrome of right wrist 04/04/2017    Elevated parathyroid hormone 05/10/2017    Low TSH level 05/10/2017    Hypogammaglobulinemia 05/10/2017    Right carpal tunnel syndrome 05/29/2017    Iron deficiency anemia due to chronic blood loss 06/29/2017    Pain in right hand 06/30/2017    Pain in right elbow 06/30/2017    Range of motion deficit 06/30/2017    Decreased  strength of right hand 06/30/2017    Right shoulder pain 08/21/2017    Pure hypercholesterolemia 01/08/2018    Research study patient 04/30/2018    Left elbow pain 06/20/2018    Rupture  of left triceps tendon 10/17/2018    Pain in left elbow 11/01/2018    Stiffness of left elbow joint 01/10/2019    Weakness of left arm 01/10/2019    Left hip pain 03/06/2019    Acute hip pain, left 03/20/2019    Gait abnormality 03/20/2019    Greater trochanteric bursitis of left hip 05/13/2019    Stress fracture of neck of left femur 05/20/2019    Left leg weakness 06/06/2019    Impaired functional mobility, balance, gait, and endurance 06/13/2019    Buttock pain 09/27/2019    Difficulty walking 12/05/2019    ROBERT (iron deficiency anemia) 01/24/2020    Fibromyalgia 03/12/2020    Recurrent UTI     Atrophic vaginitis     Drug-induced constipation     Dry mouth     Nocturia     Urinary frequency     Urge urinary incontinence     RADHA (stress urinary incontinence, female)     Elevated serum GGT level 03/17/2021    History of long term use of Methotrexate  03/17/2021    Transaminasemia 03/17/2021    Chronic pain 03/29/2021    Acute pain of left shoulder 04/26/2021    Osteoarthritis of lumbar spine 08/23/2021    Decreased range of motion with decreased strength 10/05/2021    Impaired mobility and activities of daily living 10/05/2021    Decreased strength of lower extremity 10/08/2021    Healthcare maintenance 01/18/2022    Abnormal finding of blood chemistry, unspecified  01/19/2022    Stress fracture of left foot 06/28/2022    Primary osteoarthritis, left ankle and foot 06/28/2022    Pes planovalgus, acquired, left 06/28/2022    Degenerative joint disease of hindfoot, left 06/28/2022    Decreased strength, endurance, and mobility 07/05/2022    Impaired tolerance of activity 07/05/2022    Current chronic use of systemic steroids 12/29/2022    Hormone replacement therapy (HRT) 12/29/2022    Edema 12/29/2022    Leukopenia 12/29/2022    History of COVID-19 12/29/2022    Neuropathic pain of left foot 02/03/2023    Impaired gait and mobility 03/01/2023    Lumbar stenosis 06/23/2023    Lower extremity edema 06/23/2023     Cutaneous abscess of right knee 06/27/2023    Long term (current) use of inhaled steroids 06/29/2023    History of falling 06/29/2023    Encounter for other orthopedic aftercare 06/29/2023    Normocytic anemia 06/29/2023    Age-related osteoporosis without current pathological fracture 06/29/2023    Long-term use of high-risk medication 11/07/2023    S/P lumbar fusion 07/31/2024     Past Medical History:   Diagnosis Date    Acid reflux     Allergy     Anemia     Asthma     Degenerative disc disease     Lobular carcinoma in situ     Osteoarthritis     Osteoporosis     Rheumatoid arthritis(714.0)        Review of Systems  General: negative for chills, fever or weight loss  Psychological: negative for mood changes or depression  Ophthalmic: negative for blurry vision, photophobia or eye pain  ENT: see HPI  Respiratory: no cough, shortness of breath, or wheezing  Cardiovascular: no chest pain or dyspnea on exertion  Gastrointestinal: no abdominal pain, change in bowel habits, or black/ bloody stools  Musculoskeletal: negative for gait disturbance or muscular weakness  Neurological: no syncope or seizures; no ataxia  Dermatological: negative for pruritis, rash and jaundice  Hematologic/lymphatic: no easy bruising, no new adenopathy    Physical Exam     Vitals:    07/24/25 1328   BP: 115/63   Pulse: 94                   Constitutional:   She is oriented to person, place, and time. Vital signs are normal. She appears well-developed and well-nourished. She appears alert. She is cooperative. Normal speech.      Head:  Normocephalic and atraumatic. Salivary glands normal.  Facial strength is normal.      Ears:    Right Ear: No drainage. Right ear tenderness: mild tenderness in lateral portion EAC near where crusted area located.Tympanic membrane is not perforated, not erythematous and not retracted. No middle ear effusion (scant, bloody). A PE tube is seen.   Left Ear: No drainage. Tympanic membrane is not perforated, not  erythematous and not retracted.  No middle ear effusion (scant serous). A PE tube (In place and patent) is seen.   Ears:      Nose:  Mucosal edema present. No rhinorrhea, septal deviation or polyps. Turbinates abnormal and turbinate hypertrophy (3+, boggy, congested mucosa).  Right sinus exhibits no maxillary sinus tenderness and no frontal sinus tenderness. Left sinus exhibits no maxillary sinus tenderness and no frontal sinus tenderness.     Mouth/Throat  Oropharynx clear and moist without lesions or asymmetry, normal uvula midline, lips, teeth, and gums normal and oropharynx normal. No uvula swelling, oral lesions, trismus or mucous membrane lesions. No oropharyngeal exudate, posterior oropharyngeal edema or posterior oropharyngeal erythema. Mirror exam not performed due to patient tolerance.  Mirror exam not performed due to patient tolerance.      Neck:  Neck normal without thyromegaly masses, asymmetry, normal tracheal structure, crepitus, and tenderness, thyroid normal, trachea normal, phonation normal, full range of motion with neck supple and no adenopathy. No JVD present. Carotid bruit is not present. No thyroid mass and no thyromegaly present.     She has no cervical adenopathy.     Cardiovascular:    Normal rate, regular rhythm and rate and rhythm, heart sounds, and pulses normal.              Pulmonary/Chest:   Effort and breath sounds normal.     Psychiatric:   She has a normal mood and affect. Her speech is normal and behavior is normal.     Neurological:   She is alert and oriented to person, place, and time. She has neurological normal, alert and oriented. No cranial nerve deficit.     Skin:   No abrasions, lacerations, lesions, or rashes.       Audiogram: Interpreted by me and reviewed with the patient today.  Mild bilateral sensorineural hearing loss isolated at 2:50 a.m. and 2000 hertz, moderate to severe high-frequency hearing loss at 8000 hertz bilaterally.  Tympanograms flat bilaterally, PE  tubes in place.  Large volumes bilaterally.                      Assessment/Plan:    No diagnosis found.        Assessment & Plan    - Assessed hearing and tube functionality post-tube insertion.  - tubes appear functional based on exam and volumes on tympanograms  - Recommend ear drops to potentially address any blockage in right tube, to be used twice daily for 1-2 weeks.  - Suggested assistive hearing devices as temporary measure to alleviate hearing difficulties.  - Explained potential causes of hearing changes, including tube positioning, previous barotrauma, and effects of recent infections.  - Discussed natural process of tube extrusion and healing.  - Patient to use earplugs when showering to minimize water entry into ears.  - Patient to use a hair dryer on cool setting to dry ears after showering.  - Recommend using a handheld shower head to control water direction.  - Follow up after hyperbaric oxygen treatments are complete or if any other issues arise before then.  - Contact the office to schedule appointment once hyperbaric treatments are finished.         Love Whaley MD  [facility]          This note was generated with the assistance of ambient listening technology. Verbal consent was obtained by the patient and accompanying visitor(s) for the recording of patient appointment to facilitate this note. I attest to having reviewed and edited the generated note for accuracy, though some syntax or spelling errors may persist. Please contact the author of this note for any clarification.

## 2025-07-24 NOTE — PROGRESS NOTES
Joyce Peterson, a 64 y.o. female, was seen today in the clinic for an audiologic evaluation.  Ms. Peterson reports difficulty hearing, especially in the right ear, following PE tube insertion. She describes a sensation of constant fluid in the ear. She has no complaints with the tube in her left ear.  She reports occasional ear pain and uses ear drops that were prescribed for her in the past. Ms. Peterson is concerned about her ear symptoms because she is undergoing hyperbaric treatments and she has about 30 more sessions to go.      Audiogram results revealed a mild to severe sensorineural hearing loss bilaterally.  Speech reception thresholds were noted at 15 dB in the right ear and 10 dB in the left ear.  Tympanometry revealed Type B in the right ear with an ear canal volume of 2.88 ml and Type B in the left ear with an ear canal volume of 6.27 ml. The results of the audiogram were reviewed with the patient.    Recommendations:  Otologic evaluation  Follow up audiometric testing as needed  Hearing protection when in noise

## 2025-07-24 NOTE — PATIENT INSTRUCTIONS
Use ear drops in the right ear for 1-2 weeks to see if this helps right ear feel less blocked.      Follow up after HBO treatments complete or if any other issues arise before then.     We will decide about keeping PE tubes in place after HBO done.      We discussed amplifying devices to help with mild Hearing loss for now.

## 2025-07-31 ENCOUNTER — PATIENT MESSAGE (OUTPATIENT)
Dept: OTOLARYNGOLOGY | Facility: CLINIC | Age: 65
End: 2025-07-31
Payer: MEDICARE

## 2025-08-01 ENCOUNTER — PATIENT MESSAGE (OUTPATIENT)
Dept: RHEUMATOLOGY | Facility: CLINIC | Age: 65
End: 2025-08-01
Payer: MEDICARE

## 2025-08-01 ENCOUNTER — PATIENT MESSAGE (OUTPATIENT)
Dept: OTOLARYNGOLOGY | Facility: CLINIC | Age: 65
End: 2025-08-01
Payer: MEDICARE

## 2025-08-01 ENCOUNTER — PATIENT MESSAGE (OUTPATIENT)
Dept: HEMATOLOGY/ONCOLOGY | Facility: CLINIC | Age: 65
End: 2025-08-01
Payer: MEDICARE

## 2025-08-01 DIAGNOSIS — M79.18 MYOFASCIAL PAIN: ICD-10-CM

## 2025-08-01 DIAGNOSIS — G57.03 PIRIFORMIS SYNDROME OF BOTH SIDES: ICD-10-CM

## 2025-08-01 RX ORDER — TIZANIDINE 4 MG/1
4 TABLET ORAL EVERY 6 HOURS PRN
Qty: 120 TABLET | Refills: 1 | Status: SHIPPED | OUTPATIENT
Start: 2025-08-01

## 2025-08-02 DIAGNOSIS — M19.90 OSTEOARTHRITIS, UNSPECIFIED OSTEOARTHRITIS TYPE, UNSPECIFIED SITE: ICD-10-CM

## 2025-08-04 ENCOUNTER — PATIENT MESSAGE (OUTPATIENT)
Dept: ADMINISTRATIVE | Facility: OTHER | Age: 65
End: 2025-08-04
Payer: MEDICARE

## 2025-08-04 ENCOUNTER — PATIENT MESSAGE (OUTPATIENT)
Dept: HEMATOLOGY/ONCOLOGY | Facility: CLINIC | Age: 65
End: 2025-08-04
Payer: MEDICARE

## 2025-08-04 DIAGNOSIS — D64.9 NORMOCYTIC ANEMIA: Primary | ICD-10-CM

## 2025-08-04 DIAGNOSIS — D53.9 NUTRITIONAL ANEMIA, UNSPECIFIED: ICD-10-CM

## 2025-08-04 NOTE — TELEPHONE ENCOUNTER
You can use either one.   Ofloxacin is usually cheaper, so I sent that one this time.    They are equivalent.     Love Whaley MD  [facility]    This note was created by combination of typed  and MModal dictation.  Transcription errors may be present.  If there are any questions, please contact me.

## 2025-08-05 RX ORDER — NAPROXEN 500 MG/1
500 TABLET ORAL 2 TIMES DAILY PRN
Qty: 180 TABLET | Refills: 0 | Status: SHIPPED | OUTPATIENT
Start: 2025-08-05

## 2025-08-08 ENCOUNTER — PATIENT MESSAGE (OUTPATIENT)
Dept: HEMATOLOGY/ONCOLOGY | Facility: CLINIC | Age: 65
End: 2025-08-08
Payer: MEDICARE

## 2025-08-08 DIAGNOSIS — D53.9 NUTRITIONAL ANEMIA, UNSPECIFIED: Primary | ICD-10-CM

## 2025-08-11 RX ORDER — SODIUM CHLORIDE 0.9 % (FLUSH) 0.9 %
10 SYRINGE (ML) INJECTION
Status: CANCELLED | OUTPATIENT
Start: 2025-08-11

## 2025-08-12 ENCOUNTER — PATIENT MESSAGE (OUTPATIENT)
Dept: OTOLARYNGOLOGY | Facility: CLINIC | Age: 65
End: 2025-08-12
Payer: MEDICARE

## 2025-08-12 RX ORDER — LEVOCETIRIZINE DIHYDROCHLORIDE 5 MG/1
5 TABLET, FILM COATED ORAL NIGHTLY
Qty: 30 TABLET | Refills: 11 | Status: SHIPPED | OUTPATIENT
Start: 2025-08-12 | End: 2026-08-12

## 2025-08-20 ENCOUNTER — TELEPHONE (OUTPATIENT)
Dept: PAIN MEDICINE | Facility: CLINIC | Age: 65
End: 2025-08-20
Payer: MEDICARE

## 2025-08-20 ENCOUNTER — PATIENT MESSAGE (OUTPATIENT)
Dept: GASTROENTEROLOGY | Facility: CLINIC | Age: 65
End: 2025-08-20
Payer: MEDICARE

## 2025-08-20 ENCOUNTER — PATIENT MESSAGE (OUTPATIENT)
Dept: PAIN MEDICINE | Facility: CLINIC | Age: 65
End: 2025-08-20
Payer: MEDICARE

## 2025-08-20 DIAGNOSIS — M96.1 POSTLAMINECTOMY SYNDROME OF LUMBAR REGION: ICD-10-CM

## 2025-08-21 ENCOUNTER — OFFICE VISIT (OUTPATIENT)
Dept: HEPATOLOGY | Facility: CLINIC | Age: 65
End: 2025-08-21
Payer: MEDICARE

## 2025-08-21 ENCOUNTER — HOSPITAL ENCOUNTER (OUTPATIENT)
Dept: RADIOLOGY | Facility: HOSPITAL | Age: 65
Discharge: HOME | End: 2025-08-21
Attending: NURSE PRACTITIONER
Payer: MEDICARE

## 2025-08-21 ENCOUNTER — PROCEDURE VISIT (OUTPATIENT)
Dept: HEPATOLOGY | Facility: CLINIC | Age: 65
End: 2025-08-21
Payer: MEDICARE

## 2025-08-21 VITALS — BODY MASS INDEX: 28.14 KG/M2 | HEIGHT: 61 IN | WEIGHT: 149.06 LBS

## 2025-08-21 DIAGNOSIS — K76.0 NAFLD (NONALCOHOLIC FATTY LIVER DISEASE): ICD-10-CM

## 2025-08-21 DIAGNOSIS — Z79.1 LONG TERM CURRENT USE OF NON-STEROIDAL ANTI-INFLAMMATORIES (NSAID): ICD-10-CM

## 2025-08-21 DIAGNOSIS — M06.9 RHEUMATOID ARTHRITIS INVOLVING MULTIPLE SITES, UNSPECIFIED WHETHER RHEUMATOID FACTOR PRESENT: Chronic | ICD-10-CM

## 2025-08-21 DIAGNOSIS — Z92.21 HISTORY OF METHOTREXATE THERAPY: ICD-10-CM

## 2025-08-21 DIAGNOSIS — Z79.899 IMMUNOSUPPRESSION DUE TO DRUG THERAPY: ICD-10-CM

## 2025-08-21 DIAGNOSIS — M35.00 SJOGREN'S SYNDROME, WITH UNSPECIFIED ORGAN INVOLVEMENT: ICD-10-CM

## 2025-08-21 DIAGNOSIS — E78.00 PURE HYPERCHOLESTEROLEMIA: ICD-10-CM

## 2025-08-21 DIAGNOSIS — R74.8 ELEVATED SERUM GGT LEVEL: ICD-10-CM

## 2025-08-21 DIAGNOSIS — R74.8 ELEVATED SERUM GGT LEVEL: Primary | ICD-10-CM

## 2025-08-21 DIAGNOSIS — D84.821 IMMUNOSUPPRESSION DUE TO DRUG THERAPY: ICD-10-CM

## 2025-08-21 DIAGNOSIS — R74.8 ELEVATED ALKALINE PHOSPHATASE LEVEL: ICD-10-CM

## 2025-08-21 PROCEDURE — 99999 PR PBB SHADOW E&M-EST. PATIENT-LVL IV: CPT | Mod: PBBFAC,,, | Performed by: NURSE PRACTITIONER

## 2025-08-21 PROCEDURE — 3008F BODY MASS INDEX DOCD: CPT | Mod: CPTII,S$GLB,, | Performed by: NURSE PRACTITIONER

## 2025-08-21 PROCEDURE — 1160F RVW MEDS BY RX/DR IN RCRD: CPT | Mod: CPTII,S$GLB,, | Performed by: NURSE PRACTITIONER

## 2025-08-21 PROCEDURE — 76700 US EXAM ABDOM COMPLETE: CPT | Mod: TC

## 2025-08-21 PROCEDURE — 1159F MED LIST DOCD IN RCRD: CPT | Mod: CPTII,S$GLB,, | Performed by: NURSE PRACTITIONER

## 2025-08-21 PROCEDURE — 99214 OFFICE O/P EST MOD 30 MIN: CPT | Mod: S$GLB,,, | Performed by: NURSE PRACTITIONER

## 2025-08-21 PROCEDURE — 76700 US EXAM ABDOM COMPLETE: CPT | Mod: 26,,, | Performed by: RADIOLOGY

## 2025-08-21 RX ORDER — MORPHINE SULFATE 15 MG/1
15 TABLET, FILM COATED, EXTENDED RELEASE ORAL 3 TIMES DAILY
Qty: 90 TABLET | Refills: 0 | Status: SHIPPED | OUTPATIENT
Start: 2025-08-22 | End: 2025-09-21

## 2025-08-25 ENCOUNTER — E-VISIT (OUTPATIENT)
Dept: UROLOGY | Facility: CLINIC | Age: 65
End: 2025-08-25
Payer: MEDICARE

## 2025-08-25 ENCOUNTER — PATIENT MESSAGE (OUTPATIENT)
Dept: UROLOGY | Facility: CLINIC | Age: 65
End: 2025-08-25

## 2025-08-25 ENCOUNTER — TELEPHONE (OUTPATIENT)
Dept: UROLOGY | Facility: CLINIC | Age: 65
End: 2025-08-25

## 2025-08-25 ENCOUNTER — TELEPHONE (OUTPATIENT)
Dept: FAMILY MEDICINE | Facility: CLINIC | Age: 65
End: 2025-08-25
Payer: MEDICARE

## 2025-08-25 ENCOUNTER — PATIENT MESSAGE (OUTPATIENT)
Dept: FAMILY MEDICINE | Facility: CLINIC | Age: 65
End: 2025-08-25
Payer: MEDICARE

## 2025-08-25 DIAGNOSIS — R35.0 URINARY FREQUENCY: ICD-10-CM

## 2025-08-25 DIAGNOSIS — R39.15 URINARY URGENCY: ICD-10-CM

## 2025-08-25 DIAGNOSIS — R30.0 DYSURIA: Primary | ICD-10-CM

## 2025-08-25 DIAGNOSIS — R39.14 FEELING OF INCOMPLETE BLADDER EMPTYING: ICD-10-CM

## 2025-08-25 DIAGNOSIS — R10.2 SUPRAPUBIC PAIN: ICD-10-CM

## 2025-08-25 DIAGNOSIS — N32.89 BLADDER SPASMS: ICD-10-CM

## 2025-08-25 RX ORDER — PHENAZOPYRIDINE HYDROCHLORIDE 200 MG/1
200 TABLET, FILM COATED ORAL 3 TIMES DAILY PRN
Qty: 9 TABLET | Refills: 0 | Status: SHIPPED | OUTPATIENT
Start: 2025-08-25 | End: 2025-08-29

## 2025-08-26 ENCOUNTER — TELEPHONE (OUTPATIENT)
Dept: UROLOGY | Facility: CLINIC | Age: 65
End: 2025-08-26
Payer: MEDICARE

## 2025-08-26 DIAGNOSIS — K31.84 GASTROPARESIS: Primary | ICD-10-CM

## 2025-08-26 RX ORDER — NITROFURANTOIN 25; 75 MG/1; MG/1
100 CAPSULE ORAL 2 TIMES DAILY
Qty: 14 CAPSULE | Refills: 0 | Status: SHIPPED | OUTPATIENT
Start: 2025-08-26 | End: 2025-08-29 | Stop reason: ALTCHOICE

## 2025-08-27 ENCOUNTER — OFFICE VISIT (OUTPATIENT)
Dept: DERMATOLOGY | Facility: CLINIC | Age: 65
End: 2025-08-27
Payer: MEDICARE

## 2025-08-27 DIAGNOSIS — L81.4 LENTIGO: ICD-10-CM

## 2025-08-27 DIAGNOSIS — L82.1 SK (SEBORRHEIC KERATOSIS): ICD-10-CM

## 2025-08-27 DIAGNOSIS — L30.9 HAND ECZEMA: ICD-10-CM

## 2025-08-27 DIAGNOSIS — L72.11 PILAR CYST: ICD-10-CM

## 2025-08-27 DIAGNOSIS — D18.01 CHERRY ANGIOMA: ICD-10-CM

## 2025-08-27 DIAGNOSIS — B07.8 COMMON WART: Primary | ICD-10-CM

## 2025-08-27 PROCEDURE — 99204 OFFICE O/P NEW MOD 45 MIN: CPT | Mod: S$GLB,,, | Performed by: DERMATOLOGY

## 2025-08-27 PROCEDURE — 1160F RVW MEDS BY RX/DR IN RCRD: CPT | Mod: CPTII,S$GLB,, | Performed by: DERMATOLOGY

## 2025-08-27 PROCEDURE — 1159F MED LIST DOCD IN RCRD: CPT | Mod: CPTII,S$GLB,, | Performed by: DERMATOLOGY

## 2025-08-27 PROCEDURE — 99999 PR PBB SHADOW E&M-EST. PATIENT-LVL IV: CPT | Mod: PBBFAC,,, | Performed by: DERMATOLOGY

## 2025-08-27 RX ORDER — FLUOROURACIL 50 MG/ML
SOLUTION TOPICAL
Qty: 1 EACH | Refills: 2 | Status: SHIPPED | OUTPATIENT
Start: 2025-08-27

## 2025-08-29 ENCOUNTER — TELEPHONE (OUTPATIENT)
Dept: UROLOGY | Facility: CLINIC | Age: 65
End: 2025-08-29
Payer: MEDICARE

## 2025-08-29 ENCOUNTER — PATIENT MESSAGE (OUTPATIENT)
Dept: UROLOGY | Facility: CLINIC | Age: 65
End: 2025-08-29
Payer: MEDICARE

## 2025-08-29 ENCOUNTER — PATIENT MESSAGE (OUTPATIENT)
Dept: FAMILY MEDICINE | Facility: CLINIC | Age: 65
End: 2025-08-29
Payer: MEDICARE

## 2025-08-29 DIAGNOSIS — N30.00 ACUTE CYSTITIS WITHOUT HEMATURIA: Primary | ICD-10-CM

## 2025-08-29 RX ORDER — LEVOFLOXACIN 500 MG/1
500 TABLET, FILM COATED ORAL DAILY
Qty: 7 TABLET | Refills: 0 | Status: SHIPPED | OUTPATIENT
Start: 2025-08-29 | End: 2025-09-05

## 2025-09-02 ENCOUNTER — OFFICE VISIT (OUTPATIENT)
Dept: ALLERGY | Facility: CLINIC | Age: 65
End: 2025-09-02
Payer: MEDICARE

## 2025-09-02 VITALS — WEIGHT: 145.94 LBS | BODY MASS INDEX: 27.55 KG/M2 | HEIGHT: 61 IN

## 2025-09-02 DIAGNOSIS — R76.8 WEAK ANTIBODY RESPONSE TO PNEUMOCOCCAL VACCINE: ICD-10-CM

## 2025-09-02 DIAGNOSIS — J45.909 ASTHMA, UNSPECIFIED ASTHMA SEVERITY, UNSPECIFIED WHETHER COMPLICATED, UNSPECIFIED WHETHER PERSISTENT: ICD-10-CM

## 2025-09-02 DIAGNOSIS — D80.1 HYPOGAMMAGLOBULINEMIA: ICD-10-CM

## 2025-09-02 DIAGNOSIS — J31.0 CHRONIC RHINITIS: ICD-10-CM

## 2025-09-02 DIAGNOSIS — Z86.19 FREQUENT INFECTIONS: Primary | ICD-10-CM

## 2025-09-02 DIAGNOSIS — J32.9 RECURRENT SINUSITIS: ICD-10-CM

## 2025-09-02 PROCEDURE — 3008F BODY MASS INDEX DOCD: CPT | Mod: CPTII,S$GLB,, | Performed by: STUDENT IN AN ORGANIZED HEALTH CARE EDUCATION/TRAINING PROGRAM

## 2025-09-02 PROCEDURE — 99205 OFFICE O/P NEW HI 60 MIN: CPT | Mod: S$GLB,,, | Performed by: STUDENT IN AN ORGANIZED HEALTH CARE EDUCATION/TRAINING PROGRAM

## 2025-09-02 PROCEDURE — 1160F RVW MEDS BY RX/DR IN RCRD: CPT | Mod: CPTII,S$GLB,, | Performed by: STUDENT IN AN ORGANIZED HEALTH CARE EDUCATION/TRAINING PROGRAM

## 2025-09-02 PROCEDURE — 99999 PR PBB SHADOW E&M-EST. PATIENT-LVL IV: CPT | Mod: PBBFAC,,, | Performed by: STUDENT IN AN ORGANIZED HEALTH CARE EDUCATION/TRAINING PROGRAM

## 2025-09-02 PROCEDURE — 1159F MED LIST DOCD IN RCRD: CPT | Mod: CPTII,S$GLB,, | Performed by: STUDENT IN AN ORGANIZED HEALTH CARE EDUCATION/TRAINING PROGRAM

## 2025-09-04 ENCOUNTER — CLINICAL SUPPORT (OUTPATIENT)
Dept: ALLERGY | Facility: CLINIC | Age: 65
End: 2025-09-04
Payer: MEDICARE

## 2025-09-04 DIAGNOSIS — Z86.19 FREQUENT INFECTIONS: Primary | ICD-10-CM

## 2025-09-04 DIAGNOSIS — R76.8 WEAK ANTIBODY RESPONSE TO PNEUMOCOCCAL VACCINE: ICD-10-CM

## 2025-09-04 PROCEDURE — 90732 PPSV23 VACC 2 YRS+ SUBQ/IM: CPT | Mod: S$GLB,,, | Performed by: STUDENT IN AN ORGANIZED HEALTH CARE EDUCATION/TRAINING PROGRAM

## 2025-09-04 PROCEDURE — G0009 ADMIN PNEUMOCOCCAL VACCINE: HCPCS | Mod: S$GLB,,, | Performed by: STUDENT IN AN ORGANIZED HEALTH CARE EDUCATION/TRAINING PROGRAM

## (undated) DEVICE — DRESSING MEPILEX BORDER 4 X 4

## (undated) DEVICE — DRESSING LEUKOPLAST FLEX 1X3IN

## (undated) DEVICE — PAD CAST SPECIALIST STRL 6

## (undated) DEVICE — SUT 4/0 18IN ETHILON BL P3

## (undated) DEVICE — SPONGE LAP 4X18 PREWASHED

## (undated) DEVICE — APPLICATOR CHLORAPREP ORN 26ML

## (undated) DEVICE — DRAPE C-ARMOR EQUIPMENT COVER

## (undated) DEVICE — DRESSING ADAPTIC TOUCH 3X4

## (undated) DEVICE — SUT ETHILON 3/0 18IN PS-1

## (undated) DEVICE — Device

## (undated) DEVICE — DIFFUSER

## (undated) DEVICE — FORCEP STRAIGHT DISP

## (undated) DEVICE — SEE MEDLINE ITEM 157173

## (undated) DEVICE — KIT SPINAL PATIENT CARE JACK

## (undated) DEVICE — ELECTRODE REM PLYHSV RETURN 9

## (undated) DEVICE — SUT 0 VICRYL / CT-1

## (undated) DEVICE — DRAPE STERI-DRAPE 1000 17X11IN

## (undated) DEVICE — DRAPE PLASTIC U 60X72

## (undated) DEVICE — ALCOHOL 70% ISOP RUBBING 4OZ

## (undated) DEVICE — WIRE GUIDE 3.2MM 400MM
Type: IMPLANTABLE DEVICE | Site: FEMUR | Status: NON-FUNCTIONAL
Removed: 2019-05-21

## (undated) DEVICE — GAUZE DERMACEA LOW PLY 2X4YRDS

## (undated) DEVICE — PACK UPPER EXTREMITY BAPTIST

## (undated) DEVICE — SEE MEDLINE ITEM 152515

## (undated) DEVICE — TRAY MINOR ORTHO OMC

## (undated) DEVICE — SEE MEDLINE ITEM 157131

## (undated) DEVICE — TUBE FRAZIER 5MM 2FT SOFT TIP

## (undated) DEVICE — SPONGE GAUZE 16PLY 4X4

## (undated) DEVICE — UNDERGLOVES BIOGEL PI SZ 7 LF

## (undated) DEVICE — DRESSING TRANS 4X4 TEGADERM

## (undated) DEVICE — TAPE SURG DURAPORE 2 SGL USE

## (undated) DEVICE — UNDERGLOVE BIOGEL PI SZ 6.5 LF

## (undated) DEVICE — TIP YANKAUERS BULB NO VENT

## (undated) DEVICE — IMPLANTABLE DEVICE
Type: IMPLANTABLE DEVICE | Site: FOOT | Status: NON-FUNCTIONAL
Removed: 2023-01-11

## (undated) DEVICE — SYR 10CC LUER LOCK

## (undated) DEVICE — CORD BIPOLAR 12 FOOT

## (undated) DEVICE — GOWN POLY REINF BRTH SLV LG

## (undated) DEVICE — SUT VICRYL PLUS 2-0 CT1 18

## (undated) DEVICE — SUT 2-0 SILK 30IN BLK BRAID

## (undated) DEVICE — SUT D SPECIAL VICRYL 2-0

## (undated) DEVICE — DRAPE C ARM 42 X 120 10/BX

## (undated) DEVICE — TRAY MINOR ORTHO

## (undated) DEVICE — BLADE SURG STAINLESS STEEL #15

## (undated) DEVICE — BANDAGE CONFORM 3IN STRL

## (undated) DEVICE — BANDAGE ELASTIC 2X5 VELCRO ST

## (undated) DEVICE — SPONGE COTTON TRAY 4X4IN

## (undated) DEVICE — DRAPE C-ARM ELAS CLIP 42X120IN

## (undated) DEVICE — COVER BACK TBL HD 2-TIER 72IN

## (undated) DEVICE — BANDAGE ESMARK 6X12

## (undated) DEVICE — DRESSING N ADH OIL EMUL 3X3

## (undated) DEVICE — BLADE SAGITTA 5/BX

## (undated) DEVICE — STRIP MEDI WND CLSR 1X5IN

## (undated) DEVICE — PAD CAST SPECIALIST STRL 4

## (undated) DEVICE — SYS CLSR DERMABOND PRINEO 22CM

## (undated) DEVICE — PAD ABD 8X10 STERILE

## (undated) DEVICE — DRAPE T EXTRM SURG 121X128X90

## (undated) DEVICE — TAPE SURG DURAPORE 2 X10YD

## (undated) DEVICE — SOL PVP-I SCRUB 7.5% 4OZ

## (undated) DEVICE — DRAPE SURG W/TWL 17 5/8X23

## (undated) DEVICE — TRAY DRY SKIN SCRUB PREP

## (undated) DEVICE — TOURNIQUET SB QC DP 18X4IN

## (undated) DEVICE — DRAPE TOP 53X102IN

## (undated) DEVICE — SEE MEDLINE ITEM 157110

## (undated) DEVICE — SPONGE LAP 18X18 PREWASHED

## (undated) DEVICE — NDL 22GA X1 1/2 REG BEVEL

## (undated) DEVICE — DRESSING AQUACEL FOAM 5 X 5

## (undated) DEVICE — SEE MEDLINE ITEM 157117

## (undated) DEVICE — PADDING CAST 4IN SPECIALIST

## (undated) DEVICE — SEE MEDLINE ITEM 152522

## (undated) DEVICE — BLADE MILL BONE MEDIUM

## (undated) DEVICE — KIT PT CARE HANA PROFX SSXT

## (undated) DEVICE — PAD UNDERPAD 30X30

## (undated) DEVICE — BUR BONE CUT MICRO TPS 3X3.8MM

## (undated) DEVICE — SEE MEDLINE ITEM 157150

## (undated) DEVICE — NDL HYPO A BEVEL 18X1 1/2

## (undated) DEVICE — UNDERGLOVES BIOGEL PI SIZE 7.5

## (undated) DEVICE — PAD CAST SPECIALIST STRL 3

## (undated) DEVICE — TRAY NEURO OMC

## (undated) DEVICE — BANDAGE ADHESIVE

## (undated) DEVICE — SEE MEDLINE ITEM 146268

## (undated) DEVICE — CARTRIDGE OIL

## (undated) DEVICE — DRAPE IOBAN 2 STERI

## (undated) DEVICE — TOURNIQUET SB QC SP 18X4IN

## (undated) DEVICE — DRESSING AQUACEL FOAM 3 X 3

## (undated) DEVICE — GLOVE BIOGEL PI MICRO SZ 6.5

## (undated) DEVICE — DRUG BACITRACIN UNGT OINTMENT

## (undated) DEVICE — BNDG COFLEX FOAM LF2 ST 4X5YD

## (undated) DEVICE — IMMOBILIZER SHOULDER RAINBOW M

## (undated) DEVICE — FORCEP BIPOLAR ADSON 1MM TIP

## (undated) DEVICE — GAUZE SPONGE 4'X4 12 PLY

## (undated) DEVICE — CLOSURE SKIN STERI STRIP 1/2X4

## (undated) DEVICE — SPLINT PLASTER FAST SET 5X30IN

## (undated) DEVICE — NDL HYPO SAFETY 22 X 1 1/2

## (undated) DEVICE — SUT ETHILON 3-0 PS2 18 BLK

## (undated) DEVICE — SPONGE DERMACEA 4X4IN 12PLY

## (undated) DEVICE — KIT SURGIFLO HEMOSTATIC MATRIX

## (undated) DEVICE — GLOVE BIOGEL PI MICRO SZ 7.5

## (undated) DEVICE — DRESSING AQUACEL SACRAL 9 X 9

## (undated) DEVICE — SUT VICRYL+ 1 CT1 18IN

## (undated) DEVICE — STOCKINETTE TUBULAR 2PL 6 X 4

## (undated) DEVICE — GAUZE SPONGE 4X4 12PLY

## (undated) DEVICE — HOSE DUAL W/CPC CONNECTORS

## (undated) DEVICE — DRAPE ABDOMINAL TIBURON 14X11

## (undated) DEVICE — DRAPE STERI U-SHAPED 47X51IN

## (undated) DEVICE — BANDAGE KLING 3

## (undated) DEVICE — KIT EVACUATOR 3-SPRING 1/8 DRN

## (undated) DEVICE — TOURNIQUET SB QC DP 34X4IN

## (undated) DEVICE — DRESSING ABSRBNT ISLAND 3.6X8

## (undated) DEVICE — NDL SPINAL 18GX3.5 SPINOCAN

## (undated) DEVICE — ORTHOLOC 2 JOINT PREP INSTRUMENT KIT

## (undated) DEVICE — MARKER SKIN STND TIP BLUE BARR

## (undated) DEVICE — DRESSING SPONGE 8PLY 4X4 STRL

## (undated) DEVICE — GLOVE BIOGEL SKINSENSE PI 7.0

## (undated) DEVICE — SYR IRRIGATION BULB STER 60ML

## (undated) DEVICE — RETRIEVER SUTURE HEWSON DISP

## (undated) DEVICE — DRESSING AQUACEL AG ADV 3.5X6

## (undated) DEVICE — SUT MONOCRYL 3-0 PS-2 UND

## (undated) DEVICE — DRAPE CORETEMP FLD WRM 56X62IN

## (undated) DEVICE — BLADE SURG CARBON STEEL #10